# Patient Record
Sex: FEMALE | Race: WHITE | NOT HISPANIC OR LATINO | Employment: OTHER | URBAN - METROPOLITAN AREA
[De-identification: names, ages, dates, MRNs, and addresses within clinical notes are randomized per-mention and may not be internally consistent; named-entity substitution may affect disease eponyms.]

---

## 2017-03-29 ENCOUNTER — OFFICE VISIT (OUTPATIENT)
Dept: PAIN MEDICINE | Facility: CLINIC | Age: 52
End: 2017-03-29

## 2017-03-29 VITALS
TEMPERATURE: 98.5 F | DIASTOLIC BLOOD PRESSURE: 75 MMHG | RESPIRATION RATE: 18 BRPM | HEART RATE: 82 BPM | OXYGEN SATURATION: 99 % | SYSTOLIC BLOOD PRESSURE: 129 MMHG | BODY MASS INDEX: 26.03 KG/M2 | HEIGHT: 66 IN | WEIGHT: 162 LBS

## 2017-03-29 DIAGNOSIS — M54.40 CHRONIC LOW BACK PAIN WITH SCIATICA, SCIATICA LATERALITY UNSPECIFIED, UNSPECIFIED BACK PAIN LATERALITY: ICD-10-CM

## 2017-03-29 DIAGNOSIS — G89.29 CHRONIC LOW BACK PAIN WITH SCIATICA, SCIATICA LATERALITY UNSPECIFIED, UNSPECIFIED BACK PAIN LATERALITY: ICD-10-CM

## 2017-03-29 DIAGNOSIS — Z97.8 PRESENCE OF INTRATHECAL PUMP: ICD-10-CM

## 2017-03-29 DIAGNOSIS — G89.3 CANCER ASSOCIATED PAIN: Primary | ICD-10-CM

## 2017-03-29 LAB
POC AMPHETAMINES: NEGATIVE
POC BARBITURATES: NEGATIVE
POC BENZODIAZEPHINES: NEGATIVE
POC COCAINE: NEGATIVE
POC METHADONE: NEGATIVE
POC METHAMPHETAMINE SCREEN URINE: NEGATIVE
POC OPIATES: NEGATIVE
POC OXYCODONE: NEGATIVE
POC PHENCYCLIDINE: NEGATIVE
POC PROPOXYPHENE: NEGATIVE
POC THC: NEGATIVE
POC TRICYCLIC ANTIDEPRESSANTS: NEGATIVE

## 2017-03-29 PROCEDURE — 62369 ANAL SP INF PMP W/REPRG&FILL: CPT | Performed by: ANESTHESIOLOGY

## 2017-03-29 PROCEDURE — 99202 OFFICE O/P NEW SF 15 MIN: CPT | Performed by: ANESTHESIOLOGY

## 2017-03-29 PROCEDURE — 80305 DRUG TEST PRSMV DIR OPT OBS: CPT | Performed by: ANESTHESIOLOGY

## 2017-03-29 RX ORDER — HYDROCODONE BITARTRATE AND ACETAMINOPHEN 10; 325 MG/1; MG/1
1 TABLET ORAL EVERY 4 HOURS PRN
Qty: 50 TABLET | Refills: 0 | Status: SHIPPED | OUTPATIENT
Start: 2017-03-29 | End: 2017-04-05 | Stop reason: SDUPTHER

## 2017-03-29 RX ORDER — ACETAMINOPHEN 325 MG/1
650-975 TABLET ORAL AS NEEDED
Status: ON HOLD | COMMUNITY
End: 2019-10-18

## 2017-03-29 RX ORDER — FENTANYL 100 UG/H
1 PATCH TRANSDERMAL
COMMUNITY
End: 2017-03-29

## 2017-03-29 NOTE — PROGRESS NOTES
CHIEF COMPLAINT  Ms. Llanes states that her back pain started 10 years ago and she was diagnosed with Hodgkin's Lymphoma about 6 years ago. She states that she was previously in pain management with Dr. Crystal Gallego at [facility]. She states that she currently has a pian pump but states that bit has been empty for the last 2 months. It was last filled by Dr. Silvano Rowland. She states that her has had back injection but states that it didn't help.    Subjective   Renée Llanes is a 51 y.o. female.   She presents to the office for evaluation of cancer related pain. She was referred here by Dr. Jermaine Iraheta.   He is a cancer patient with an indwelling intrathecal pump.  She was displaced from the practice of Dr. Rowland, and unfortunately they allowed this patient's pump to run dry.      She also has a significant history of back pain issues.  The use of the intrathecal pump is reasonable for this as well as she has few other options except for long term oral analgesics.    She complains that the use of fentanyl patches causes an untenable side effect... She detects a sour, foul smell, but no one else can smell it.      RENÉE LLANES does 1ppd smoke. Smokes 1 PPD x many years. In terms of alcohol use, patient no use of alcohol. Drinks 0 per year. Family history is negative for alcoholism or addiction. Family history is negative for illicit drug use.          History of Present Illness     PEG Assessment   What number best describes your pain on average in the past week?8  What number best describes how, during the past week, pain has interfered with your enjoyment of life?9  What number best describes how, during the past week, pain has interfered with your general activity?  9        Current Outpatient Prescriptions:   •  acetaminophen (TYLENOL) 325 MG tablet, Take 325 mg by mouth As Needed for Mild Pain (1-3)., Disp: , Rfl:   •  HYDROcodone-acetaminophen (NORCO)  MG per tablet, Take 1 tablet by mouth  "Every 4 (Four) Hours As Needed for Severe Pain (7-10)., Disp: 50 tablet, Rfl: 0    Of note,,,, the hydrocodone was added today by me, ,as in the plan below.  She is instructed to stop using Fentanyl.      The following portions of the patient's history were reviewed and updated as appropriate: allergies, current medications, past family history, past medical history, past social history, past surgical history and problem list.      REVIEW OF PERTINENT MEDICAL DATA    ---    Prelim UDS Immunoassay:    THC  (marijuana)  negative  VINICIUS (cocaine) negative  OPI (opiate) negative  AMP (amphet) negative  MET (methamph) negative  PCP (pcp)  negative  MDMA (ecstacy) negative  BAR (barbituate) negative  BZO (benzodiaz) negative  MTD (methadone) negative  TCA (tricyclic) negative  OXY (oxycodone) negative    GreenTemp Warm  Appearance  WNL          ---                Review of Systems   Constitutional: Positive for fatigue.   HENT: Negative for congestion.    Eyes: Negative for visual disturbance.   Respiratory: Negative for cough, shortness of breath and wheezing.    Cardiovascular: Negative.    Gastrointestinal: Negative for constipation and diarrhea.   Genitourinary: Positive for difficulty urinating.   Musculoskeletal: Positive for back pain.   Neurological: Positive for weakness (bilateral legs) and numbness (bilateral legs).   Psychiatric/Behavioral: Positive for sleep disturbance. Negative for suicidal ideas. The patient is nervous/anxious.            Vitals:    03/29/17 1203   BP: 129/75   Pulse: 82   Resp: 18   Temp: 98.5 °F (36.9 °C)   SpO2: 99%   Weight: 162 lb (73.5 kg)   Height: 66\" (167.6 cm)   PainSc:   8   PainLoc: Back         Objective   Physical Exam   Constitutional: Vital signs are normal. She has a sickly appearance. She appears ill.   HENT:   Head: Normocephalic and atraumatic. Hair is abnormal.   Right Ear: Hearing and external ear normal.   Left Ear: Hearing and external ear normal.   Nose: Nose normal. "   Mouth/Throat: Uvula is midline and oropharynx is clear and moist.   Eyes: Conjunctivae and EOM are normal. Pupils are equal, round, and reactive to light.   Pulmonary/Chest: Effort normal.   Abdominal: Soft. Normal appearance and bowel sounds are normal. She exhibits mass (LLQ implanted Intrathecal pump). There is no hepatosplenomegaly. There is no tenderness.   Musculoskeletal:        Lumbar back: She exhibits decreased range of motion and tenderness.   Neurological: She is alert. She displays atrophy. No cranial nerve deficit. She exhibits abnormal muscle tone. Gait abnormal.   Skin: Bruising noted. No rash noted. Nails show clubbing.   Psychiatric: She has a normal mood and affect. Her speech is normal.   Nursing note and vitals reviewed.      Assessment/Plan   Isha was seen today for back pain.    Diagnoses and all orders for this visit:    Cancer associated pain  -     Urine Drug Screen Confirmation  -     POC Urine Drug Screen, Triage    Presence of intrathecal pump  -     Urine Drug Screen Confirmation  -     POC Urine Drug Screen, Triage    Chronic low back pain with sciatica, sciatica laterality unspecified, unspecified back pain laterality    Other orders  -     HYDROcodone-acetaminophen (NORCO)  MG per tablet; Take 1 tablet by mouth Every 4 (Four) Hours As Needed for Severe Pain (7-10).        --- Follow-up next week  --- we are going to bring her back next week to see if the pump is functioning.....  -- fill the pump with Normal Saline, running at 1ml per day  -- giving her some hydrocodone for cancer related Breakthrough pain (BTP)  -- discontinue duragesic patches  -- order pump solution today, assuming the pump is functioning so we can refill next week  -- if pump is not functioning, then we will need to potentially do a dye study to check patency of the catheter before setting up for a pump reservoir switch out.  Of course, she may not be a candidate to have any surgical procedure based  on immune status, and would therefore confer with Dr. Iraheta.               TERRELL REPORT    As part of the patient's treatment plan, I am prescribing controlled substances. The patient has been made aware of appropriate use of such medications, including potential risk of somnolence, limited ability to drive and/or work safely, and the potential for dependence or overdose. It has also bee made clear that these medications are for use by this patient only, without concomitant use of alcohol or other substances unless prescribed.     Patient has completed prescribing agreement detailing terms of continued prescribing of controlled substances, including monitoring TERRELL reports, urine drug screening, and pill counts if necessary. The patient is aware that inappropriate use will results in cessation of prescribing such medications.    TERRELL report has been reviewed and scanned into the patient's chart.    Date of last TERRELL : 3-25-17    History and physical exam exhibit continued safe and appropriate use of controlled substances.         EMR Dragon/Transcription disclaimer:   Much of this encounter note is an electronic transcription/translation of spoken language to printed text. The electronic translation of spoken language may permit erroneous, or at times, nonsensical words or phrases to be inadvertently transcribed; Although I have reviewed the note for such errors, some may still exist.        --    Intrathecal pump was interrogated in office today. Results are in chart.    The patient is currently at a dose of 12 mg  of hyrdomorphone per day, with previously running this 20ml pump with a concentration of 80mg per ml. There is the addition of PTM bolus. This is set at 0.5 mg/actuation given over 10 minutes. The lock-out is set at 6 hours with a maximum activation of 4 in 24 hours.    The pump had run dry.  The estimated volume dispensed since the last fill, based on the rotor calculations from the pump computer,  was the ludicrous number of 29.1ml.     Under sterile technique, the pump was accessed using a [employer] proprietary refill kit. The volume was withdrawn from the pump. The expected residual volume of the pump was 0 ml. The actual residual volume of the pump was 0 ml.    The pump was then refilled with 20 ml of Normal saline.  The pump was set to deliver 1ml per day.  Of course, the PTM was discontinued.    Patient tolerated procedure well. Minimal bleeding was noted. Pump site remains intact with no evidence of erythema or drainage.    Patient will return for pump refill when due or sooner if needed.

## 2017-04-05 ENCOUNTER — OFFICE VISIT (OUTPATIENT)
Dept: PAIN MEDICINE | Facility: CLINIC | Age: 52
End: 2017-04-05

## 2017-04-05 VITALS
HEIGHT: 66 IN | DIASTOLIC BLOOD PRESSURE: 81 MMHG | BODY MASS INDEX: 26.03 KG/M2 | HEART RATE: 70 BPM | WEIGHT: 162 LBS | RESPIRATION RATE: 16 BRPM | SYSTOLIC BLOOD PRESSURE: 121 MMHG | OXYGEN SATURATION: 96 %

## 2017-04-05 DIAGNOSIS — G89.29 OTHER CHRONIC PAIN: Primary | ICD-10-CM

## 2017-04-05 DIAGNOSIS — G89.3 CANCER ASSOCIATED PAIN: ICD-10-CM

## 2017-04-05 DIAGNOSIS — M54.41 CHRONIC LOW BACK PAIN WITH BILATERAL SCIATICA, UNSPECIFIED BACK PAIN LATERALITY: ICD-10-CM

## 2017-04-05 DIAGNOSIS — M54.42 CHRONIC LOW BACK PAIN WITH BILATERAL SCIATICA, UNSPECIFIED BACK PAIN LATERALITY: ICD-10-CM

## 2017-04-05 DIAGNOSIS — Z97.8 PRESENCE OF INTRATHECAL PUMP: ICD-10-CM

## 2017-04-05 DIAGNOSIS — G89.29 CHRONIC LOW BACK PAIN WITH BILATERAL SCIATICA, UNSPECIFIED BACK PAIN LATERALITY: ICD-10-CM

## 2017-04-05 PROCEDURE — 62369 ANAL SP INF PMP W/REPRG&FILL: CPT | Performed by: NURSE PRACTITIONER

## 2017-04-05 RX ORDER — HYDROCODONE BITARTRATE AND ACETAMINOPHEN 10; 325 MG/1; MG/1
1 TABLET ORAL EVERY 8 HOURS PRN
Qty: 45 TABLET | Refills: 0 | Status: SHIPPED | OUTPATIENT
Start: 2017-04-05 | End: 2017-04-13 | Stop reason: SDUPTHER
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
## 2017-04-13 ENCOUNTER — OFFICE VISIT (OUTPATIENT)
Dept: PAIN MEDICINE | Facility: CLINIC | Age: 52
End: 2017-04-13

## 2017-04-13 VITALS
OXYGEN SATURATION: 95 % | HEART RATE: 87 BPM | BODY MASS INDEX: 26.03 KG/M2 | WEIGHT: 162 LBS | DIASTOLIC BLOOD PRESSURE: 82 MMHG | RESPIRATION RATE: 16 BRPM | HEIGHT: 66 IN | TEMPERATURE: 99.2 F | SYSTOLIC BLOOD PRESSURE: 122 MMHG

## 2017-04-13 DIAGNOSIS — G89.3 CANCER ASSOCIATED PAIN: ICD-10-CM

## 2017-04-13 DIAGNOSIS — M54.41 CHRONIC LOW BACK PAIN WITH BILATERAL SCIATICA, UNSPECIFIED BACK PAIN LATERALITY: ICD-10-CM

## 2017-04-13 DIAGNOSIS — M54.42 CHRONIC LOW BACK PAIN WITH BILATERAL SCIATICA, UNSPECIFIED BACK PAIN LATERALITY: ICD-10-CM

## 2017-04-13 DIAGNOSIS — G89.29 OTHER CHRONIC PAIN: Primary | ICD-10-CM

## 2017-04-13 DIAGNOSIS — Z97.8 PRESENCE OF INTRATHECAL PUMP: ICD-10-CM

## 2017-04-13 DIAGNOSIS — G89.29 CHRONIC LOW BACK PAIN WITH BILATERAL SCIATICA, UNSPECIFIED BACK PAIN LATERALITY: ICD-10-CM

## 2017-04-13 PROCEDURE — 62368 ANALYZE SP INF PUMP W/REPROG: CPT | Performed by: NURSE PRACTITIONER

## 2017-04-13 RX ORDER — HYDROCODONE BITARTRATE AND ACETAMINOPHEN 10; 325 MG/1; MG/1
1 TABLET ORAL EVERY 8 HOURS PRN
Qty: 45 TABLET | Refills: 0 | Status: SHIPPED | OUTPATIENT
Start: 2017-04-13 | End: 2017-05-05 | Stop reason: SDUPTHER
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
## 2017-05-05 ENCOUNTER — OFFICE VISIT (OUTPATIENT)
Dept: PAIN MEDICINE | Facility: CLINIC | Age: 52
End: 2017-05-05

## 2017-05-05 VITALS
HEART RATE: 93 BPM | TEMPERATURE: 98.5 F | SYSTOLIC BLOOD PRESSURE: 107 MMHG | DIASTOLIC BLOOD PRESSURE: 73 MMHG | RESPIRATION RATE: 16 BRPM | BODY MASS INDEX: 26.03 KG/M2 | WEIGHT: 162 LBS | HEIGHT: 66 IN | OXYGEN SATURATION: 94 %

## 2017-05-05 DIAGNOSIS — G89.29 OTHER CHRONIC PAIN: Primary | ICD-10-CM

## 2017-05-05 DIAGNOSIS — G89.29 CHRONIC LOW BACK PAIN WITH BILATERAL SCIATICA, UNSPECIFIED BACK PAIN LATERALITY: ICD-10-CM

## 2017-05-05 DIAGNOSIS — M54.41 CHRONIC LOW BACK PAIN WITH BILATERAL SCIATICA, UNSPECIFIED BACK PAIN LATERALITY: ICD-10-CM

## 2017-05-05 DIAGNOSIS — G89.3 CANCER ASSOCIATED PAIN: ICD-10-CM

## 2017-05-05 DIAGNOSIS — Z97.8 PRESENCE OF INTRATHECAL PUMP: ICD-10-CM

## 2017-05-05 DIAGNOSIS — M54.42 CHRONIC LOW BACK PAIN WITH BILATERAL SCIATICA, UNSPECIFIED BACK PAIN LATERALITY: ICD-10-CM

## 2017-05-05 PROCEDURE — 99214 OFFICE O/P EST MOD 30 MIN: CPT | Performed by: NURSE PRACTITIONER

## 2017-05-05 RX ORDER — ALPRAZOLAM 0.5 MG/1
0.5 TABLET ORAL 2 TIMES DAILY PRN
COMMUNITY
End: 2017-09-28

## 2017-05-05 RX ORDER — ONDANSETRON 4 MG/1
4 TABLET, FILM COATED ORAL EVERY 8 HOURS PRN
COMMUNITY
End: 2017-09-28 | Stop reason: SDUPTHER

## 2017-05-05 RX ORDER — HYDROCODONE BITARTRATE AND ACETAMINOPHEN 10; 325 MG/1; MG/1
1 TABLET ORAL EVERY 8 HOURS PRN
Qty: 45 TABLET | Refills: 0 | Status: SHIPPED | OUTPATIENT
Start: 2017-05-05 | End: 2017-05-19 | Stop reason: SDUPTHER

## 2017-05-05 RX ORDER — CLINDAMYCIN HYDROCHLORIDE 300 MG/1
300 CAPSULE ORAL 3 TIMES DAILY
COMMUNITY
End: 2017-09-28

## 2017-05-19 ENCOUNTER — OFFICE VISIT (OUTPATIENT)
Dept: PAIN MEDICINE | Facility: CLINIC | Age: 52
End: 2017-05-19

## 2017-05-19 VITALS
SYSTOLIC BLOOD PRESSURE: 117 MMHG | HEART RATE: 61 BPM | HEIGHT: 66 IN | WEIGHT: 162 LBS | OXYGEN SATURATION: 93 % | BODY MASS INDEX: 26.03 KG/M2 | DIASTOLIC BLOOD PRESSURE: 76 MMHG | RESPIRATION RATE: 16 BRPM | TEMPERATURE: 98.8 F

## 2017-05-19 DIAGNOSIS — G89.3 CANCER ASSOCIATED PAIN: ICD-10-CM

## 2017-05-19 DIAGNOSIS — M54.42 CHRONIC LOW BACK PAIN WITH BILATERAL SCIATICA, UNSPECIFIED BACK PAIN LATERALITY: Primary | ICD-10-CM

## 2017-05-19 DIAGNOSIS — M54.41 CHRONIC LOW BACK PAIN WITH BILATERAL SCIATICA, UNSPECIFIED BACK PAIN LATERALITY: Primary | ICD-10-CM

## 2017-05-19 DIAGNOSIS — Z97.8 PRESENCE OF INTRATHECAL PUMP: ICD-10-CM

## 2017-05-19 DIAGNOSIS — G89.29 CHRONIC LOW BACK PAIN WITH BILATERAL SCIATICA, UNSPECIFIED BACK PAIN LATERALITY: Primary | ICD-10-CM

## 2017-05-19 PROCEDURE — 62369 ANAL SP INF PMP W/REPRG&FILL: CPT | Performed by: PAIN MEDICINE

## 2017-05-19 RX ORDER — HYDROCODONE BITARTRATE AND ACETAMINOPHEN 10; 325 MG/1; MG/1
1 TABLET ORAL EVERY 8 HOURS PRN
Qty: 45 TABLET | Refills: 0 | Status: SHIPPED | OUTPATIENT
Start: 2017-05-19 | End: 2017-06-15 | Stop reason: SDUPTHER

## 2017-06-15 ENCOUNTER — OFFICE VISIT (OUTPATIENT)
Dept: PAIN MEDICINE | Facility: CLINIC | Age: 52
End: 2017-06-15

## 2017-06-15 VITALS
WEIGHT: 165 LBS | SYSTOLIC BLOOD PRESSURE: 110 MMHG | HEIGHT: 66 IN | DIASTOLIC BLOOD PRESSURE: 85 MMHG | RESPIRATION RATE: 16 BRPM | TEMPERATURE: 97.6 F | OXYGEN SATURATION: 97 % | BODY MASS INDEX: 26.52 KG/M2 | HEART RATE: 58 BPM

## 2017-06-15 DIAGNOSIS — M54.41 CHRONIC LOW BACK PAIN WITH BILATERAL SCIATICA, UNSPECIFIED BACK PAIN LATERALITY: ICD-10-CM

## 2017-06-15 DIAGNOSIS — T85.610A MALFUNCTION OF INTRATHECAL INFUSION PUMP, INITIAL ENCOUNTER: ICD-10-CM

## 2017-06-15 DIAGNOSIS — G89.29 CHRONIC LOW BACK PAIN WITH BILATERAL SCIATICA, UNSPECIFIED BACK PAIN LATERALITY: ICD-10-CM

## 2017-06-15 DIAGNOSIS — Z97.8 PRESENCE OF INTRATHECAL PUMP: ICD-10-CM

## 2017-06-15 DIAGNOSIS — G89.29 OTHER CHRONIC PAIN: Primary | ICD-10-CM

## 2017-06-15 DIAGNOSIS — G89.3 CANCER ASSOCIATED PAIN: ICD-10-CM

## 2017-06-15 DIAGNOSIS — M54.42 CHRONIC LOW BACK PAIN WITH BILATERAL SCIATICA, UNSPECIFIED BACK PAIN LATERALITY: ICD-10-CM

## 2017-06-15 PROCEDURE — 62369 ANAL SP INF PMP W/REPRG&FILL: CPT | Performed by: NURSE PRACTITIONER

## 2017-06-15 RX ORDER — HYDROCODONE BITARTRATE AND ACETAMINOPHEN 10; 325 MG/1; MG/1
1 TABLET ORAL EVERY 8 HOURS PRN
Qty: 90 TABLET | Refills: 0 | Status: SHIPPED | OUTPATIENT
Start: 2017-06-15 | End: 2017-07-07 | Stop reason: SDUPTHER

## 2017-06-15 NOTE — PROGRESS NOTES
"CHIEF COMPLAINT  Since 5/5/16 office visit, Pt states LBP has increased.  Pt has not begun PT,states was told by the lymphedma clinic Pt should not try aqua therapy now due to \"possible skin breakdown in legs\".  Seen by vascular specialist,reportedly told had no vascular problems.    Subjective   Isha Llanes is a 51 y.o. female  who presents to the office for follow-up.She has a history of chronic low back pain.  She presents today for IT pump refill.  She continues to report that her pain is overall not well controlled and does not feel any improvement since the previous significant dose increase last month.  She reports that her pain is constant and debilitating. She feels that she cannot walk or stand up straight without significant pain.  Nothing really seems to be helping.  She has been unable to start any type of aquatic therapy because of her lymphoma and frequent open sores of the LE's, she reports that her neighbor comes over and massages her regularly. She continues to use hydrocodone for breakthrough pain in addition to the IT pump.  She denies any side effects from either regimen.        Back Pain   This is a chronic problem. The current episode started more than 1 year ago. The problem occurs constantly. The problem has been gradually worsening since onset. The pain is present in the lumbar spine. The quality of the pain is described as aching, burning and shooting. The pain radiates to the left foot and right foot. The pain is at a severity of 8/10. The pain is severe. The pain is the same all the time. Exacerbated by: all activity. Associated symptoms include numbness (bilateral legs) and weakness (bilateral legs). Pertinent negatives include no bladder incontinence, bowel incontinence or dysuria. Risk factors include history of cancer, lack of exercise and sedentary lifestyle. She has tried analgesics for the symptoms. The treatment provided mild relief.        PEG Assessment   What number best " "describes your pain on average in the past week?8  What number best describes how, during the past week, pain has interfered with your enjoyment of life?7  What number best describes how, during the past week, pain has interfered with your general activity?  8      The following portions of the patient's history were reviewed and updated as appropriate: allergies, current medications, past family history, past medical history, past social history, past surgical history and problem list.    Review of Systems   Constitutional: Positive for activity change (decreased) and fatigue. Negative for appetite change.   HENT: Negative for congestion.    Eyes: Negative for visual disturbance.   Respiratory: Negative for apnea, cough, chest tightness, shortness of breath and wheezing.    Cardiovascular: Positive for leg swelling (improvement).   Gastrointestinal: Positive for constipation and nausea. Negative for bowel incontinence and diarrhea.   Genitourinary: Negative for bladder incontinence, difficulty urinating (incontinence since 2 weeks ago,not daily) and dysuria.   Musculoskeletal: Positive for back pain.   Neurological: Positive for weakness (bilateral legs) and numbness (bilateral legs). Negative for dizziness and light-headedness (3 days ago (5/16/17)).   Psychiatric/Behavioral: Positive for sleep disturbance. Negative for suicidal ideas. The patient is nervous/anxious.        Vitals:    06/15/17 0958   BP: 110/85   Pulse: 58   Resp: 16   Temp: 97.6 °F (36.4 °C)   SpO2: 97%   Weight: 165 lb (74.8 kg)   Height: 66\" (167.6 cm)   PainSc: 8  Comment: LBP ranges from   PainLoc: Back         Objective   Physical Exam   Constitutional: She is oriented to person, place, and time. She appears well-developed and well-nourished. No distress.   HENT:   Head: Normocephalic and atraumatic.   Cardiovascular: Normal rate.    Pulmonary/Chest: Effort normal. No respiratory distress.   Neurological: She is alert and oriented to person, " place, and time. No cranial nerve deficit. Gait (uses motorized wheelchair) abnormal.   Skin: She is not diaphoretic. There is erythema.   Erythema over bilateral distal LE with mild swelling present        - Intrathecal pump was interrogated in office today. Results are in chart.  - Under sterile technique, the pump was accessed using a [employer] proprietary refill kit. The volume was withdrawn from the pump. The expected residual volume of the pump was 6.3 ml. The actual residual volume of the pump was 4.5 ml. The pump was then refilled with 20 ml of Hydromorphone/Naropin at a concentration of 5 mg/ml. Patient tolerated procedure well. Minimal bleeding was noted. Pump site remains intact with no evidence of erythema or drainage.       Assessment/Plan   Isha was seen today for back pain.    Diagnoses and all orders for this visit:    Other chronic pain    Malfunction of intrathecal infusion pump, initial encounter  -     Case Request    Cancer associated pain    Presence of intrathecal pump    Chronic low back pain with bilateral sciatica, unspecified back pain laterality    Other orders  -     HYDROcodone-acetaminophen (NORCO)  MG per tablet; Take 1 tablet by mouth Every 8 (Eight) Hours As Needed for Severe Pain (7-10).        --- Schedule for pump dye study due to increasing discrepancy between expected volume and actual residual volume as well as patient reporting sub therapeutic response despite medication increases   --- Increase daily continuous rate to 2.5mg Hydromorphone/Naropin daily. Will not make changes to PTM today  --- Refill hydrocodone.  Patient appears stable with current regimen. No adverse effects. Regarding continuation of opioids, there is no evidence of aberrant behavior or any red flags.  The patient continues with appropriate response to opioid therapy. ADL's remain intact by self.   --- The urine drug screen confirmation from 3- has been reviewed and the result is  appropriate based on patient history and TERRELL report  --- Likely would benefit from some physical therapy in the future as she is very deconditioned   --- Follow-up next refill         TERRELL REPORT  As part of the patient's treatment plan, I am prescribing controlled substances. The patient has been made aware of appropriate use of such medications, including potential risk of somnolence, limited ability to drive and/or work safely, and the potential for dependence or overdose. It has also bee made clear that these medications are for use by this patient only, without concomitant use of alcohol or other substances unless prescribed.     Patient has completed prescribing agreement detailing terms of continued prescribing of controlled substances, including monitoring TERRELL reports, urine drug screening, and pill counts if necessary. The patient is aware that inappropriate use will results in cessation of prescribing such medications.    TERRELL report has been reviewed and scanned into the patient's chart.    Date of last TERRELL : 6-    History and physical exam exhibit continued safe and appropriate use of controlled substances.    EMR Dragon/Transcription disclaimer:   Much of this encounter note is an electronic transcription/translation of spoken language to printed text. The electronic translation of spoken language may permit erroneous, or at times, nonsensical words or phrases to be inadvertently transcribed; Although I have reviewed the note for such errors, some may still exist.

## 2017-07-07 ENCOUNTER — OFFICE VISIT (OUTPATIENT)
Dept: PAIN MEDICINE | Facility: CLINIC | Age: 52
End: 2017-07-07

## 2017-07-07 VITALS
SYSTOLIC BLOOD PRESSURE: 112 MMHG | HEART RATE: 84 BPM | TEMPERATURE: 98.4 F | DIASTOLIC BLOOD PRESSURE: 73 MMHG | BODY MASS INDEX: 25.71 KG/M2 | WEIGHT: 160 LBS | OXYGEN SATURATION: 95 % | HEIGHT: 66 IN | RESPIRATION RATE: 16 BRPM

## 2017-07-07 DIAGNOSIS — G89.3 CANCER ASSOCIATED PAIN: Primary | ICD-10-CM

## 2017-07-07 DIAGNOSIS — M54.41 CHRONIC LOW BACK PAIN WITH BILATERAL SCIATICA, UNSPECIFIED BACK PAIN LATERALITY: ICD-10-CM

## 2017-07-07 DIAGNOSIS — G89.29 CHRONIC LOW BACK PAIN WITH BILATERAL SCIATICA, UNSPECIFIED BACK PAIN LATERALITY: ICD-10-CM

## 2017-07-07 DIAGNOSIS — M54.42 CHRONIC LOW BACK PAIN WITH BILATERAL SCIATICA, UNSPECIFIED BACK PAIN LATERALITY: ICD-10-CM

## 2017-07-07 DIAGNOSIS — Z97.8 PRESENCE OF INTRATHECAL PUMP: ICD-10-CM

## 2017-07-07 PROCEDURE — 62369 ANAL SP INF PMP W/REPRG&FILL: CPT | Performed by: NURSE PRACTITIONER

## 2017-07-07 RX ORDER — HYDROCODONE BITARTRATE AND ACETAMINOPHEN 10; 325 MG/1; MG/1
1 TABLET ORAL EVERY 8 HOURS PRN
Qty: 90 TABLET | Refills: 0 | Status: SHIPPED | OUTPATIENT
Start: 2017-07-07 | End: 2017-07-28 | Stop reason: SDUPTHER

## 2017-07-07 NOTE — PROGRESS NOTES
"CHIEF COMPLAINT  Pt here for pump refill.  Pt states LBP has \"not been well controlled\".    Subjective   Isha Llanes is a 51 y.o. female  who presents to the office for follow-up.She has a history of chronic back pain.      Her oral pain medication fell in the toilet 6 days ago. Has been unable to take.     Notes impaired sleep.    Is seeing a psychiatrist at the end of the month. Wants to re-start xanax. \"It helps things.\"    Complains of pain in her back. Today her pain is 7/10VAS. Describes the pain as continuous. She states it is not necessarily worse since her last office visit, but it is not improved and not well controlled over all. She asked about increasing her oral pain medication.  Denies any side effects from the pump medication/PTM dosing. Denies any side effects with Hydrocodone. Is prescribed Hydrocodone 10/325 q8hrs but is not currently taking because it fell into her toilet. Is helps her pain \"mildly.\"     Back Pain   This is a chronic problem. The current episode started more than 1 year ago. The problem occurs constantly. The problem has been gradually worsening since onset. The pain is present in the lumbar spine. The quality of the pain is described as aching, burning and shooting. The pain radiates to the left foot and right foot. The pain is at a severity of 7/10. The pain is the same all the time. Exacerbated by: all activity. Associated symptoms include numbness (bilateral legs) and weakness (bilateral legs). Pertinent negatives include no bladder incontinence, bowel incontinence or dysuria. Risk factors include history of cancer, lack of exercise and sedentary lifestyle. She has tried analgesics for the symptoms. The treatment provided mild relief.      PEG Assessment   What number best describes your pain on average in the past week?7  What number best describes how, during the past week, pain has interfered with your enjoyment of life?7  What number best describes how, during the " "past week, pain has interfered with your general activity?  7    The following portions of the patient's history were reviewed and updated as appropriate: allergies, current medications, past family history, past medical history, past social history, past surgical history and problem list.    Review of Systems   Constitutional: Positive for fatigue. Negative for activity change (decreasedremains limited) and appetite change.   HENT: Negative for congestion.    Eyes: Negative for visual disturbance.   Respiratory: Negative for apnea, cough, chest tightness, shortness of breath and wheezing.    Cardiovascular: Positive for leg swelling (improvement).   Gastrointestinal: Negative for bowel incontinence, constipation, diarrhea and nausea.   Genitourinary: Negative for bladder incontinence, difficulty urinating (incontinence since 2 weeks ago,not daily) and dysuria.   Musculoskeletal: Positive for back pain (bilateral leg pain as well).   Neurological: Positive for weakness (bilateral legs), light-headedness (when standing upright) and numbness (bilateral legs). Negative for dizziness.   Psychiatric/Behavioral: Positive for sleep disturbance. Negative for suicidal ideas. The patient is nervous/anxious.        Vitals:    07/07/17 1152   BP: 112/73   Pulse: 84   Resp: 16   Temp: 98.4 °F (36.9 °C)   SpO2: 95%   Weight: 160 lb (72.6 kg)   Height: 66\" (167.6 cm)   PainSc: 7  Comment: LBP ranges from 6-7/10   PainLoc: Back     Objective   Physical Exam   Constitutional: She is oriented to person, place, and time. She appears well-developed and well-nourished. No distress.   HENT:   Head: Normocephalic and atraumatic.   Eyes:   Near pinpoint pupils   Cardiovascular: Normal rate.    Pulmonary/Chest: Effort normal. No respiratory distress.   Abdominal:   Pump in LLQ   Neurological: She is alert and oriented to person, place, and time. No cranial nerve deficit. Gait (uses motorized wheelchair) abnormal.   Skin: She is not " diaphoretic. There is erythema.   Erythema over bilateral distal LE with mild swelling present       - Intrathecal pump was interrogated in office today. Results are in chart.  - Under sterile technique, the pump was accessed using a [employer] proprietary refill kit. The volume was withdrawn from the pump. The expected residual volume of the pump was 6.6 ml. The actual residual volume of the pump was 5.0 ml. The pump was then refilled with 20 ml of Hydromorphone/Naropin at a concentration of 5 mg/ml. Patient tolerated procedure well. Minimal bleeding was noted. Pump site remains intact with no evidence of erythema or drainage.    Assessment/Plan   Isha was seen today for back pain.    Diagnoses and all orders for this visit:    Cancer associated pain    Chronic low back pain with bilateral sciatica, unspecified back pain laterality    Presence of intrathecal pump    Other orders  -     HYDROcodone-acetaminophen (NORCO)  MG per tablet; Take 1 tablet by mouth Every 8 (Eight) Hours As Needed for Severe Pain (7-10).      --- Refill Hydrocodone. No changes at this time. She did ask about increasing. Reviewed that i would continue with Dr. Rios/Cyndee ESCOBAR previous plan of care, especially until we had further information in regards to the function of the intrathecal pump.  --- Pump dye study due to volume reservoir discrepancies.   --- No pump changes at this time. Await results of pump dye study.  --- Follow-up for pump dye study.      +++ ADDENDUM+++ Patient called office at 1535 requesting refill of her medication. It would require an early authorization. I did approve of this. However, she will be having a pill count performed the day of her procedure.+++       TERRELL REPORT    As part of the patient's treatment plan, I am prescribing controlled substances. The patient has been made aware of appropriate use of such medications, including potential risk of somnolence, limited ability to drive  and/or work safely, and the potential for dependence or overdose. It has also bee made clear that these medications are for use by this patient only, without concomitant use of alcohol or other substances unless prescribed.     Patient has completed prescribing agreement detailing terms of continued prescribing of controlled substances, including monitoring TERRELL reports, urine drug screening, and pill counts if necessary. The patient is aware that inappropriate use will results in cessation of prescribing such medications.    TERRELL report has been reviewed and scanned into the patient's chart.    Date of last TERRELL : 7-5-17    History and physical exam exhibit continued safe and appropriate use of controlled substances.      EMR Dragon/Transcription disclaimer:   Much of this encounter note is an electronic transcription/translation of spoken language to printed text. The electronic translation of spoken language may permit erroneous, or at times, nonsensical words or phrases to be inadvertently transcribed; Although I have reviewed the note for such errors, some may still exist.

## 2017-07-11 ENCOUNTER — OUTSIDE FACILITY SERVICE (OUTPATIENT)
Dept: PAIN MEDICINE | Facility: CLINIC | Age: 52
End: 2017-07-11

## 2017-07-11 ENCOUNTER — DOCUMENTATION (OUTPATIENT)
Dept: PAIN MEDICINE | Facility: CLINIC | Age: 52
End: 2017-07-11

## 2017-07-11 PROCEDURE — 75809 NONVASCULAR SHUNT X-RAY: CPT | Performed by: ANESTHESIOLOGY

## 2017-07-11 PROCEDURE — 61070 BRAIN CANAL SHUNT PROCEDURE: CPT | Performed by: ANESTHESIOLOGY

## 2017-07-11 PROCEDURE — 62368 ANALYZE SP INF PUMP W/REPROG: CPT | Performed by: ANESTHESIOLOGY

## 2017-07-12 NOTE — PROGRESS NOTES
Intrathecal Pump Dye Study:    [facility]      PREOPERATIVE DIAGNOSIS:  Mechanical dysfunction of the IDDS system.  Cancer associated pain.  Presence of IT pump.    POSTOPERATIVE DIAGNOSIS:  Same as preop diagnosis    PROCEDURE:   Intrathecal pump dye study  - CPT 20085, for [place] Puncture for aspiration/injection of the shunt  - CPT 66586-52, for shunt x-ray  - These CPTs are as indicated by the AMA    IDDS System:   [employer]onic synchromed II      PRE-PROCEDURE DISCUSSION WITH PATIENT:    Risks and complications were discussed with the patient prior to starting the procedure and informed consent was obtained.  We discussed various topics including but not limited to bleeding, infection, injury, nerve injury, paralysis, coma, overdose, reaction to injectate and/or medication, death, postprocedural painful flare-up, postprocedural site soreness, and a lack of pain relief resulting from the procedure or the knowledge gained from the procedure, and a risk of equipment malfunction or damage.        SURGEON:  Horace Rios MD    REASON FOR PROCEDURE:    Diminishing pain relief from IDDS use.  Also noted are discrepancies in the ARV and ERV on pump fills    SEDATION:  Patient declined administration of moderate sedation      LOCAL ANESTHETICS:  Lidocaine 1%, Volume 1 mL    DESCRIPTON OF PROCEDURE:  After obtaining informed consent, an I.V. was not started in the preoperative area. The patient taken to the operating room and was placed in the supine  position.  All pressure points were well padded.  EKG, blood pressure, and pulse oximeter were monitored.  The appropriate area was prepped with Chloraprep and draped in a sterile fashion.     The site of the pump was identified.  The area overlying the side access port was anesthetized with subcutaneous solution of local anesthetic.  Fluoroscopy was utilized to visualize the orientation of the pump in its pocket.  The proprietary pump catheter  access kit was used to puncture the skin and enter into the catheter access port (side port).      Aspiration was attempted and was positive for 5ml easily aspirated fluid.      Solution of dilute contrast dye (multihance) was prepared.  Dye was slowly injected into the side access port.  Injection was with moderate to significant resistance.  Dye was seen to track through the system to the intrathecal space, with the tip at the T11-T12 level.  However, there were two significant kinks noted... One laterally in the flank, and one near the anchor before diving into the interlaminar space.      The needle was removed intact.  Vital signs were stable throughout.    Patient tolerated procedure well. Minimal bleeding was noted. Pump site remains intact with no evidence of erythema or drainage.      ESTIMATED BLOOD LOSS:  none  SPECIMENS:  none    COMPLICATIONS:   No complications were noted.    TOLERANCE & DISCHARGE CONDITION:    The patient tolerated the procedure well.  The patient was transported to the recovery area without difficulties.  The patient was discharged to home under the care of family in stable and satisfactory condition.      PLAN OF CARE:  1. The patient was given our standard instruction sheet.  2. The patient will Return to clinic 2 wks.  3. The patient will resume all medications as per the medication reconciliation sheet.    I think she will likely benefit from a system replacement.   JUAN is 24 months, but if the pump has to be extracted from the pocket, to replace the catheter, then it would be reasonable to take this opportunity to place a new pump, and also change the pocket to a posterior location to add some comfort for the patient, as the abdominal site is not well tolerated.      --    As the contents of the catheter were aspirated, the pump was interrogated and the volume of the catheter was refilled with medication....    Intrathecal pump was interrogated in the procedure room today.  Results are also scanned in chart.    The patient is currently at a dose of 2.5mg  of hydromorphone/ropivacaine per day. There is the addition of PTM bolus. This is set at 0.35 mg/actuation given over 10 minutes. The lock-out is set at 8 hours with a maximum activation of 3 in 24 hours.    Reservoir volume is estimated at 17.9 ml at this time.  Catheter volume is 0.196ml.   Refill date was 8-2-17.      No changes to infusion rate.  A simple bolus of 1.25mg HM/Naropin was given over 17 minutes.       Patient will return for pump refill when due or sooner if needed.

## 2017-07-24 ENCOUNTER — TELEPHONE (OUTPATIENT)
Dept: PAIN MEDICINE | Facility: CLINIC | Age: 52
End: 2017-07-24

## 2017-07-24 NOTE — TELEPHONE ENCOUNTER
Spoke to patient on 7/24//17 about changing her pump refill date from 7/26/17 to 7/28/17.  Patient agreed with the changed appointment and will be seen at 1000 on 7/28/17.

## 2017-07-28 ENCOUNTER — OFFICE VISIT (OUTPATIENT)
Dept: PAIN MEDICINE | Facility: CLINIC | Age: 52
End: 2017-07-28

## 2017-07-28 VITALS
DIASTOLIC BLOOD PRESSURE: 77 MMHG | BODY MASS INDEX: 25.71 KG/M2 | OXYGEN SATURATION: 97 % | TEMPERATURE: 97.6 F | SYSTOLIC BLOOD PRESSURE: 112 MMHG | HEART RATE: 65 BPM | RESPIRATION RATE: 16 BRPM | HEIGHT: 66 IN | WEIGHT: 160 LBS

## 2017-07-28 DIAGNOSIS — Z97.8 PRESENCE OF INTRATHECAL PUMP: ICD-10-CM

## 2017-07-28 DIAGNOSIS — G89.3 CANCER ASSOCIATED PAIN: ICD-10-CM

## 2017-07-28 DIAGNOSIS — M54.41 CHRONIC LOW BACK PAIN WITH BILATERAL SCIATICA, UNSPECIFIED BACK PAIN LATERALITY: ICD-10-CM

## 2017-07-28 DIAGNOSIS — G89.29 CHRONIC LOW BACK PAIN WITH BILATERAL SCIATICA, UNSPECIFIED BACK PAIN LATERALITY: ICD-10-CM

## 2017-07-28 DIAGNOSIS — G89.29 OTHER CHRONIC PAIN: Primary | ICD-10-CM

## 2017-07-28 DIAGNOSIS — T85.610A MALFUNCTION OF INTRATHECAL INFUSION PUMP, INITIAL ENCOUNTER: ICD-10-CM

## 2017-07-28 DIAGNOSIS — M54.42 CHRONIC LOW BACK PAIN WITH BILATERAL SCIATICA, UNSPECIFIED BACK PAIN LATERALITY: ICD-10-CM

## 2017-07-28 PROCEDURE — 99214 OFFICE O/P EST MOD 30 MIN: CPT | Performed by: NURSE PRACTITIONER

## 2017-07-28 PROCEDURE — 62368 ANALYZE SP INF PUMP W/REPROG: CPT | Performed by: NURSE PRACTITIONER

## 2017-07-28 RX ORDER — HYDROCODONE BITARTRATE AND ACETAMINOPHEN 10; 325 MG/1; MG/1
TABLET ORAL
Qty: 150 TABLET | Refills: 0 | Status: SHIPPED | OUTPATIENT
Start: 2017-07-28 | End: 2017-08-29 | Stop reason: SDUPTHER

## 2017-07-28 RX ORDER — METHYLPREDNISOLONE 4 MG/1
4 TABLET ORAL DAILY
COMMUNITY
End: 2017-09-28

## 2017-07-28 NOTE — PROGRESS NOTES
CHIEF COMPLAINT  Pt continues to have significant bilateral LBP and L thigh pain with bilat. Leg numbness  Pt is now taking medrol dose sammie for generalized joint pain.    Subjective   Isha Llanes is a 51 y.o. female  who presents to the office for follow-up.She has a history of chronic low back pain and cancer associated pain.  Here today for IT pump refill.  Overall pain not well controlled. Patient continues to report severe pain despite increasing amounts of IT hydromorphone. She was sent for pump dye study on 7/11/2017, showed two kinks in the line and she ultimately needs pump revision/replacement.  The JUAN is 24 months and per Dr. Rios's procedure note  if the pump has to be extracted from the pocket, to replace the catheter, then it would be reasonable to at that time place a new pump, and also change the pocket to a posterior location to add some comfort for the patient.      Patient also continues with use of oral hydrocodone for breakthrough pain.  The patient is aware that this is not part of the long term plan of care.  Hydrocodone 10/325 helps to reduce the pain moderately, she denies adverse reactions.      Back Pain   This is a chronic problem. The current episode started more than 1 year ago. The problem occurs constantly. The problem has been gradually worsening since onset. The pain is present in the lumbar spine. The quality of the pain is described as aching, burning and shooting. The pain radiates to the left foot and right foot. The pain is at a severity of 8/10. The pain is the same all the time. Exacerbated by: all activity. Associated symptoms include numbness (bilateral legs) and weakness (bilateral legs). Pertinent negatives include no bladder incontinence, bowel incontinence or dysuria. Risk factors include history of cancer, lack of exercise and sedentary lifestyle. She has tried analgesics for the symptoms. The treatment provided mild relief.      PEG Assessment   What number  "best describes your pain on average in the past week?7  What number best describes how, during the past week, pain has interfered with your enjoyment of life?8  What number best describes how, during the past week, pain has interfered with your general activity?  7    The following portions of the patient's history were reviewed and updated as appropriate: allergies, current medications, past family history, past medical history, past social history, past surgical history and problem list.    Review of Systems   Constitutional: Positive for fatigue. Negative for activity change (decreasedremains limited) and appetite change.   HENT: Negative for congestion.    Eyes: Negative for visual disturbance.   Respiratory: Negative for apnea, cough, chest tightness, shortness of breath and wheezing.    Cardiovascular: Positive for leg swelling (improvement).   Gastrointestinal: Positive for diarrhea. Negative for bowel incontinence, constipation and nausea.   Genitourinary: Negative for bladder incontinence, difficulty urinating (incontinence since 2 weeks ago,not daily) and dysuria.   Musculoskeletal: Positive for back pain (bilateral leg pain as well).   Neurological: Positive for weakness (bilateral legs), light-headedness (when standing upright) and numbness (bilateral legs). Negative for dizziness.   Psychiatric/Behavioral: Positive for sleep disturbance. Negative for suicidal ideas. The patient is nervous/anxious.        Vitals:    07/28/17 1032   BP: 112/77   Pulse: 65   Resp: 16   Temp: 97.6 °F (36.4 °C)   SpO2: 97%   Weight: 160 lb (72.6 kg)   Height: 66\" (167.6 cm)   PainSc: 8  Comment: LBP centrally and bilaterally ranges from 5-9/10   PainLoc: Back         Objective   Physical Exam   Constitutional: She is oriented to person, place, and time. She appears well-developed and well-nourished.   HENT:   Head: Normocephalic and atraumatic.   Cardiovascular: Normal rate.    Pulmonary/Chest: Effort normal. No respiratory " distress.   Abdominal:   Pump in LLQ   Neurological: She is alert and oriented to person, place, and time. No cranial nerve deficit. Gait (uses motorized wheelchair) abnormal.   Skin: There is erythema.   Erythema over bilateral distal LE with mild swelling present       Assessment/Plan   Isha was seen today for back pain.    Diagnoses and all orders for this visit:    Other chronic pain    Chronic low back pain with bilateral sciatica, unspecified back pain laterality    Presence of intrathecal pump    Cancer associated pain    Malfunction of intrathecal infusion pump, initial encounter  -     Case Request    Other orders  -     HYDROcodone-acetaminophen (NORCO)  MG per tablet; Every 4-6 hours PRN for SEVERE pain, MAX 5/DAY!!!!      --- Schedule for IT Pump revision/replacement   --- Patient also to be schedule for IT pump fill next week under xray/ultrasound as it was unable to be accessed in the office today  --- IT pump interrogated and results scanned in, no changes made to current regimen  --- Hydrocodone refilled with slight dose escalation for this acute situation as IT pump not currently functioning properly.  Patient appears stable with current regimen. No adverse effects. Regarding continuation of opioids, there is no evidence of aberrant behavior or any red flags.  The patient continues with appropriate response to opioid therapy. ADL's remain intact by self.   --- The urine drug screen confirmation from 3/9/2017 has been reviewed and the result is appropriate based on patient history and TERRELL report  --- Follow-up pump revision         TERRELL REPORT    As part of the patient's treatment plan, I am prescribing controlled substances. The patient has been made aware of appropriate use of such medications, including potential risk of somnolence, limited ability to drive and/or work safely, and the potential for dependence or overdose. It has also bee made clear that these medications are for  use by this patient only, without concomitant use of alcohol or other substances unless prescribed.     Patient has completed prescribing agreement detailing terms of continued prescribing of controlled substances, including monitoring TERRELL reports, urine drug screening, and pill counts if necessary. The patient is aware that inappropriate use will results in cessation of prescribing such medications.    TERRELL report has been reviewed and scanned into the patient's chart.    Date of last TERRELL : 7/27/2017    History and physical exam exhibit continued safe and appropriate use of controlled substances.      EMR Dragon/Transcription disclaimer:   Much of this encounter note is an electronic transcription/translation of spoken language to printed text. The electronic translation of spoken language may permit erroneous, or at times, nonsensical words or phrases to be inadvertently transcribed; Although I have reviewed the note for such errors, some may still exist.

## 2017-08-03 ENCOUNTER — DOCUMENTATION (OUTPATIENT)
Dept: PAIN MEDICINE | Facility: CLINIC | Age: 52
End: 2017-08-03

## 2017-08-03 ENCOUNTER — OUTSIDE FACILITY SERVICE (OUTPATIENT)
Dept: PAIN MEDICINE | Facility: CLINIC | Age: 52
End: 2017-08-03

## 2017-08-03 PROCEDURE — 62370 ANL SP INF PMP W/MDREPRG&FIL: CPT | Performed by: ANESTHESIOLOGY

## 2017-08-04 NOTE — PROGRESS NOTES
Intrathecal Pump Refill / Reprogram with Fluoroscopic Guidance:  [facility]      PREOPERATIVE DIAGNOSIS:  Presence of IDDS system.  Cancer associated pain.     POSTOPERATIVE DIAGNOSIS:  Same as preop diagnosis    PROCEDURE:   Intrathecal pump Refill / Reprogram requiring MD expertise, and requirement for fluoroscopic guidance.  CPT 88129, 04017    IDDS System:   [facility]      PRE-PROCEDURE DISCUSSION WITH PATIENT:    Risks and complications were discussed with the patient prior to starting the procedure and informed consent was obtained.  We discussed various topics including but not limited to bleeding, infection, injury, nerve injury, paralysis, coma, overdose, reaction to injectate and/or medication, death, postprocedural painful flare-up, postprocedural site soreness, and a lack of pain relief resulting from the procedure or the knowledge gained from the procedure, and a risk of equipment malfunction or damage.        SURGEON:  Horace Rios MD    REASON FOR PROCEDURE:    Need for IT therapy is well established.  The intrathecal pump could not be filled with a blind percutaneous technique in the office.  After multiple attempts, the procedure in the office was aborted and the patient was scheduled for image-guided refill for patient safety.      SEDATION:  Patient declined administration of moderate sedation      LOCAL ANESTHETICS:  Lidocaine 1%, Volume 1.5 mL    DESCRIPTON OF PROCEDURE:  After obtaining informed consent, an I.V. was not started in the preoperative area. The patient taken to the operating room and was placed in the supine  position.  All pressure points were well padded.  EKG, blood pressure, and pulse oximeter were monitored.      The pump was interrogated.  The pump is currently running a solution of Dilaudid & Naropin at a concentration of 5mg/5mg/mL and at a dose of 2.5mg/2.5mg per day on the basal infusion.  In addition, a PTM dose was programmed.  The PTM dose is  0.35mg every 8hrs with a lockout of 3 doses per 24 hours.    There were 32 successful activations, 17 lockouts, 90 unsuccessful activations.  JUAN is 23 months.    The site of the pump was identified.  The appropriate area was prepped with Chloraprep and draped in a sterile fashion.    The area overlying the central port was anesthetized with subcutaneous solution of local anesthetic.  Fluoroscopy was utilized to visualize the orientation of the pump in its pocket.  The proprietary pump refill kit was used to puncture the skin and enter into the reservoir access port.      Aspiration was attempted and was easily accomplished.   The Expected Residual Volume (ERV) was 4.1ml.  The Actual Residual Volume aspirated was 5.5mL.  This amount was discarded.   Witnessed discard by Ryley Tate RN.    The pump was then refilled with the same solution.  The infusion program was not changed.  20 mL were placed in the pump without complications.    The needle was removed intact.  Vital signs were stable throughout.        ESTIMATED BLOOD LOSS:  none  SPECIMENS:  none    COMPLICATIONS:   No complications were noted.    TOLERANCE & DISCHARGE CONDITION:    The patient tolerated the procedure well.  Pump site is intact with minimal tenderness, and no erythema nor drainage.  The patient was transported to the recovery area without difficulties.  The patient was discharged to home under the care of family in stable and satisfactory condition.      PLAN OF CARE:  1. The patient was given our standard instruction sheet.  2. The patient will Return to clinic PRN and Return to clinic 4 wks.  3. The patient will resume all medications as per the medication reconciliation sheet.        4.   Patient will return for pump refill when due or sooner if needed.

## 2017-08-29 ENCOUNTER — DOCUMENTATION (OUTPATIENT)
Dept: PAIN MEDICINE | Facility: CLINIC | Age: 52
End: 2017-08-29

## 2017-08-29 RX ORDER — HYDROCODONE BITARTRATE AND ACETAMINOPHEN 10; 325 MG/1; MG/1
TABLET ORAL
Qty: 150 TABLET | Refills: 0 | Status: SHIPPED | OUTPATIENT
Start: 2017-08-29 | End: 2017-09-28 | Stop reason: SDUPTHER

## 2017-08-29 NOTE — PROGRESS NOTES
Intrathecal Pump Refill / Reprogram with Fluoroscopic Guidance:  [facility]      PREOPERATIVE DIAGNOSIS:  Presence of IDDS system.  Cancer associated pain.    POSTOPERATIVE DIAGNOSIS:  Same as preop diagnosis    PROCEDURE:   Intrathecal pump Refill / Reprogram requiring MD expertise, and requirement for fluoroscopic guidance.  CPT 51209, 84621    IDDS System:   [employer]      PRE-PROCEDURE DISCUSSION WITH PATIENT:    Risks and complications were discussed with the patient prior to starting the procedure and informed consent was obtained.  We discussed various topics including but not limited to bleeding, infection, injury, nerve injury, paralysis, coma, overdose, reaction to injectate and/or medication, death, postprocedural painful flare-up, postprocedural site soreness, and a lack of pain relief resulting from the procedure or the knowledge gained from the procedure, and a risk of equipment malfunction or damage.        SURGEON:  Horace Rios MD    REASON FOR PROCEDURE:    The previous refill was performed under fluoroscopy and was easy to perform..  The intrathecal pump could not be filled with a blind percutaneous technique in the office.  After multiple attempts, the procedure in the office was aborted and the patient was scheduled for image-guided refill for patient safety.    As she did have a previous dye study did appear to have some abnormalities with want to make sure that the pump is functioning appropriately or confirm whether it is not.  There was a slight discrepancy on the previous measurement of actual residual volumes so we will check that again today.    She is complaining that her pain is not very well controlled.  Regarding her PTM use, she had 45 successful activations over the past 26 days.  Therefore 29 lockout and 101 and successful activations.  I think this indicates that we need to try to modify her therapy.    SEDATION:  Patient declined administration of  moderate sedation      LOCAL ANESTHETICS:  Lidocaine 2%, Volume 1 mL    DESCRIPTON OF PROCEDURE:  After obtaining informed consent, an I.V. was not started in the preoperative area. The patient taken to the operating room and was placed in the supine  position.  All pressure points were well padded.  EKG, blood pressure, and pulse oximeter were monitored.      The pump was interrogated.  The pump is currently running a solution of hydromorphone and ropivacaine at a concentration of 5 mg/mL and 5 mg/mL and at a dose of 2.5 mg of each per day on the basal infusion.  In addition, a PTM dose was programmed.  The PTM dose is 0.35 mg every 8 hours with a lockout of 3 doses per 24 hours.      The site of the pump was identified.  The appropriate area was prepped with Chloraprep and draped in a sterile fashion.    The area overlying the central port was anesthetized with subcutaneous solution of local anesthetic.  Fluoroscopy was utilized to visualize the orientation of the pump in its pocket.  The proprietary pump refill kit was used to puncture the skin and enter into the reservoir access port.      Aspiration was attempted and positive for the medications..   The Expected Residual Volume (ERV) was 4 mL.  The Actual Residual Volume aspirated was 2.5 mL.  This amount was discarded.       The pump was then refilled with medications of the same concentration.  The infusion program was changed.  It was changed to 40 mg of each per day, and the PTM dosing was increased to 0.5 mg which could be administered every 6 hours.  This changed the total daily dose to 5 mg per day.  This makes her refill interval 18 days from today.  The estimated battery life is 22 months.    The needle was removed intact.  Vital signs were stable throughout.        ESTIMATED BLOOD LOSS:  none  SPECIMENS:  none    COMPLICATIONS:   No complications were noted.    TOLERANCE & DISCHARGE CONDITION:    The patient tolerated the procedure well.  Pump site is  intact with minimal tenderness, and no erythema nor drainage.  The patient was transported to the recovery area without difficulties.  The patient was discharged to home under the care of family in stable and satisfactory condition.      PLAN OF CARE:  1. The patient was given our standard instruction sheet.  2. The patient will Repeat injection 2 wks and Plan for This procedure actually be a reasonable under fluoroscopic guidance once again.  We are going to her medication as 20 mg/mL of each of the active ingredients..  3. The patient will resume all medications as per the medication reconciliation sheet.        4.   Patient will return for pump refill when due or sooner if needed.

## 2017-09-14 ENCOUNTER — OUTSIDE FACILITY SERVICE (OUTPATIENT)
Dept: PAIN MEDICINE | Facility: CLINIC | Age: 52
End: 2017-09-14

## 2017-09-14 ENCOUNTER — DOCUMENTATION (OUTPATIENT)
Dept: PAIN MEDICINE | Facility: CLINIC | Age: 52
End: 2017-09-14

## 2017-09-14 PROCEDURE — 77002 NEEDLE LOCALIZATION BY XRAY: CPT | Performed by: ANESTHESIOLOGY

## 2017-09-14 PROCEDURE — 62370 ANL SP INF PMP W/MDREPRG&FIL: CPT | Performed by: ANESTHESIOLOGY

## 2017-09-15 NOTE — PROGRESS NOTES
Intrathecal Pump Refill / Reprogram with Fluoroscopic Guidance:  [facility]      PREOPERATIVE DIAGNOSIS:  Presence of IDDS system.  Cancer assoc pain.  Chr pain syndrome    POSTOPERATIVE DIAGNOSIS:  Same as preop diagnosis    PROCEDURE:   Intrathecal pump Refill / Reprogram requiring MD expertise, and requirement for fluoroscopic guidance.  CPT 37521, 78981    IDDS System:   [employer]      PRE-PROCEDURE DISCUSSION WITH PATIENT:    Risks and complications were discussed with the patient prior to starting the procedure and informed consent was obtained.  We discussed various topics including but not limited to bleeding, infection, injury, nerve injury, paralysis, coma, overdose, reaction to injectate and/or medication, death, postprocedural painful flare-up, postprocedural site soreness, and a lack of pain relief resulting from the procedure or the knowledge gained from the procedure, and a risk of equipment malfunction or damage.        SURGEON:  Horace Rios MD    REASON FOR PROCEDURE:    The previous refill under fluoro went well.  The technique is very important as we are also wanting to assess the function of this pump, as there was previously a discrepancy between ERV and ARV and we were considering whether or not to revise the system.  The intrathecal pump could not be filled with a blind percutaneous technique in the office.  After multiple attempts, the procedure in the office was aborted and the patient was scheduled for image-guided refill for patient safety.      SEDATION:  Patient declined administration of moderate sedation      LOCAL ANESTHETICS:  Lidocaine 1%, Volume 1 mL    DESCRIPTON OF PROCEDURE:  After obtaining informed consent, an I.V. was not started in the preoperative area. The patient taken to the operating room and was placed in the supine  position.  All pressure points were well padded.  EKG, blood pressure, and pulse oximeter were monitored.       The pump was interrogated.  The pump is currently running a solution of hydromorphone and ropivacaine at a concentration of 5mg/ml of each and at a dose of 3mg/3mg/day on the basal infusion.  In addition, a PTM dose was programmed.  The PTM dose is 0.5mg/0.5mg every 6 hrs with a lockout of 4 doses per 24 hours.      The site of the pump was identified.  The appropriate area was prepped with Chloraprep and draped in a sterile fashion.    The area overlying the central port was anesthetized with subcutaneous solution of local anesthetic.  Fluoroscopy was utilized to visualize the orientation of the pump in its pocket.  The proprietary pump refill kit was used to puncture the skin and enter into the reservoir access port.      Aspiration was attempted and was positive.   The Expected Residual Volume (ERV) was 9.3ml.  The Actual Residual Volume aspirated was 8ml.  This amount was discarded.       The pump was then refilled with Hydromorphone 20mg/ml & Ropivacaine 10mg/ml solution, 20ml.  The infusion program was changed.  The basal rate was changed to 4mg/2mg per day.  PTM was not changed.    The JUAN is 22 months.    The needle was removed intact.  Vital signs were stable throughout.        ESTIMATED BLOOD LOSS:  none  SPECIMENS:  none    COMPLICATIONS:   No complications were noted.    TOLERANCE & DISCHARGE CONDITION:    The patient tolerated the procedure well.  Pump site is intact with minimal tenderness, and no erythema nor drainage.  The patient was transported to the recovery area without difficulties.  The patient was discharged to home under the care of family in stable and satisfactory condition.      PLAN OF CARE:  1. The patient was given our standard instruction sheet.  2. The patient will Plan for refill PRN.  3. The patient will resume all medications as per the medication reconciliation sheet.        4.   Patient will return for pump refill when due or sooner if needed.

## 2017-09-28 ENCOUNTER — OFFICE VISIT (OUTPATIENT)
Dept: PAIN MEDICINE | Facility: CLINIC | Age: 52
End: 2017-09-28

## 2017-09-28 VITALS
SYSTOLIC BLOOD PRESSURE: 114 MMHG | HEART RATE: 81 BPM | DIASTOLIC BLOOD PRESSURE: 79 MMHG | OXYGEN SATURATION: 97 % | TEMPERATURE: 98 F | RESPIRATION RATE: 16 BRPM | HEIGHT: 66 IN

## 2017-09-28 DIAGNOSIS — G89.29 CHRONIC LOW BACK PAIN WITH BILATERAL SCIATICA, UNSPECIFIED BACK PAIN LATERALITY: ICD-10-CM

## 2017-09-28 DIAGNOSIS — M54.41 CHRONIC LOW BACK PAIN WITH BILATERAL SCIATICA, UNSPECIFIED BACK PAIN LATERALITY: ICD-10-CM

## 2017-09-28 DIAGNOSIS — M46.1 SACROILIITIS (HCC): ICD-10-CM

## 2017-09-28 DIAGNOSIS — M53.3 SACROILIAC JOINT DYSFUNCTION: ICD-10-CM

## 2017-09-28 DIAGNOSIS — G89.3 CANCER ASSOCIATED PAIN: Primary | ICD-10-CM

## 2017-09-28 DIAGNOSIS — M54.42 CHRONIC LOW BACK PAIN WITH BILATERAL SCIATICA, UNSPECIFIED BACK PAIN LATERALITY: ICD-10-CM

## 2017-09-28 DIAGNOSIS — Z97.8 PRESENCE OF INTRATHECAL PUMP: ICD-10-CM

## 2017-09-28 PROCEDURE — 99214 OFFICE O/P EST MOD 30 MIN: CPT | Performed by: NURSE PRACTITIONER

## 2017-09-28 PROCEDURE — 62368 ANALYZE SP INF PUMP W/REPROG: CPT | Performed by: NURSE PRACTITIONER

## 2017-09-28 RX ORDER — HYDROCODONE BITARTRATE AND ACETAMINOPHEN 10; 325 MG/1; MG/1
TABLET ORAL
Qty: 150 TABLET | Refills: 0 | Status: SHIPPED | OUTPATIENT
Start: 2017-09-28 | End: 2017-10-26 | Stop reason: SDUPTHER

## 2017-09-28 NOTE — PROGRESS NOTES
"CHIEF COMPLAINT  Pt states is having tenderness with brief sharp pain on a small area at lumbar surgery scar.  LBP is otherwise unchanged,with significant bilateral pain.    Subjective   Isha Llanes is a 51 y.o. female  who presents to the office for follow-up.She has a history of chronic back pain and a history of cancer.    Since her last office visit, her pump concentration was increased. The basal rate was also increased from 3.0 to 4.0 mg/day.  Did notice \"a little improvement for a few days.\"    Complains of pain in her low back. Today her pain is 7/10VAS. Describes the pain as continuous and unchanged.  Continues with IT pump. Denies any side effects from this.  Continues with Hydrocodone 10/325 5/day. Denies any side effects from the regimen. It helps decrease her pain moderately. ADL's by self.     Is having increased complaints of pain in her low back and hips. Used to have injections with significant relief    She is unsure of the status of pump revision.    Back Pain   This is a chronic problem. The current episode started more than 1 year ago. The problem occurs constantly. The problem has been gradually worsening since onset. The pain is present in the lumbar spine. The quality of the pain is described as aching, burning and shooting. The pain radiates to the left foot and right foot. The pain is at a severity of 7/10. The pain is the same all the time. Exacerbated by: all activity. Associated symptoms include numbness (bilateral legs) and weakness (bilateral legs). Pertinent negatives include no bladder incontinence, bowel incontinence or dysuria. Risk factors include history of cancer, lack of exercise and sedentary lifestyle. She has tried analgesics (IT pump, hydrocodone-moderate) for the symptoms. The treatment provided mild relief.      PEG Assessment   What number best describes your pain on average in the past week?7  What number best describes how, during the past week, pain has " "interfered with your enjoyment of life?7  What number best describes how, during the past week, pain has interfered with your general activity?  7    The following portions of the patient's history were reviewed and updated as appropriate: allergies, current medications, past family history, past medical history, past social history, past surgical history and problem list.    Review of Systems   Constitutional: Positive for fatigue. Negative for activity change (decreasedremains limited) and appetite change.   HENT: Negative for congestion.    Eyes: Negative for visual disturbance.   Respiratory: Negative for apnea, cough, chest tightness, shortness of breath and wheezing.    Cardiovascular: Positive for leg swelling (improvement).   Gastrointestinal: Negative for bowel incontinence, constipation, diarrhea and nausea.   Genitourinary: Negative for bladder incontinence, difficulty urinating (incontinence since 2 weeks ago,not daily) and dysuria.   Musculoskeletal: Positive for back pain (bilateral leg pain as well).   Neurological: Positive for weakness (bilateral legs), light-headedness (when standing upright) and numbness (bilateral legs). Negative for dizziness.   Psychiatric/Behavioral: Positive for sleep disturbance. Negative for suicidal ideas. The patient is nervous/anxious.        Vitals:    09/28/17 1055   BP: 114/79   Pulse: 81   Resp: 16   Temp: 98 °F (36.7 °C)   SpO2: 97%   Height: 66\" (167.6 cm)   PainSc: 7  Comment: LBP ranges from 6-8/10   PainLoc: Back       Objective   Physical Exam   Constitutional: She is oriented to person, place, and time. She appears well-developed and well-nourished.   HENT:   Head: Normocephalic and atraumatic.   Cardiovascular: Normal rate.    Pulmonary/Chest: Effort normal. No respiratory distress.   Abdominal:   Pump in LLQ   Musculoskeletal:        Lumbar back: She exhibits tenderness, bony tenderness and pain.   Surgical scar of lumbar spine    Exquisite tenderness of " bilateral SI joints    Unable to perform BASILIO due to patient in wheelchair.    Neurological: She is alert and oriented to person, place, and time. No cranial nerve deficit. Gait (uses motorized wheelchair) abnormal.   Skin: There is erythema.   Erythema over bilateral distal LE with mild swelling present         Assessment/Plan   Isha was seen today for back pain.    Diagnoses and all orders for this visit:    Cancer associated pain    Chronic low back pain with bilateral sciatica, unspecified back pain laterality    Presence of intrathecal pump    Sacroiliitis  -     Case Request    Sacroiliac joint dysfunction  -     Case Request    Other orders  -     HYDROcodone-acetaminophen (NORCO)  MG per tablet; Every 4-6 hours PRN for SEVERE pain, MAX 5/DAY      --- The urine drug screen confirmation from 3-29-17 has been reviewed and the result is appropriate based on patient history and TERRELL report  --- Refill Hydrocodone. Patient appears stable with current regimen. No adverse effects. Regarding continuation of opioids, there is no evidence of aberrant behavior or any red flags.  The patient continues with appropriate response to opioid therapy. ADL's remain intact by self. Reviewed only taking medication as prescribed and to not expedite the use of medication.   --- bilateral Si joint injections. No blood thinners. Reviewed the procedure at length with the patient.  Included in the review was expectations, complications, risk and benefits.The procedure was described in detail and the risks, benefits and alternatives were discussed with the patient (including but not limited to: bleeding, infection, nerve damage, worsening of pain, inability to perform injection, paralysis, seizures, and death) who agreed to proceed.  Discussed the potential for sedation if warranted/wanted.  Questions were answered and in a way the patient could understand.  Patient verbalized understanding and wishes to proceed.  This  intervention will be ordered.  --- increase pump to 4.2 mg/day.   --- Follow-up 1 month with Dr. Rios for update of plan of care.       TERRELL REPORT    As part of the patient's treatment plan, I am prescribing controlled substances. The patient has been made aware of appropriate use of such medications, including potential risk of somnolence, limited ability to drive and/or work safely, and the potential for dependence or overdose. It has also bee made clear that these medications are for use by this patient only, without concomitant use of alcohol or other substances unless prescribed.     Patient has completed prescribing agreement detailing terms of continued prescribing of controlled substances, including monitoring TERRELL reports, urine drug screening, and pill counts if necessary. The patient is aware that inappropriate use will results in cessation of prescribing such medications.    TERRELL report has been reviewed and scanned into the patient's chart.    Date of last TERRELL : 9-27-17    History and physical exam exhibit continued safe and appropriate use of controlled substances.      EMR Dragon/Transcription disclaimer:   Much of this encounter note is an electronic transcription/translation of spoken language to printed text. The electronic translation of spoken language may permit erroneous, or at times, nonsensical words or phrases to be inadvertently transcribed; Although I have reviewed the note for such errors, some may still exist.

## 2017-10-26 ENCOUNTER — OUTSIDE FACILITY SERVICE (OUTPATIENT)
Dept: PAIN MEDICINE | Facility: CLINIC | Age: 52
End: 2017-10-26

## 2017-10-26 ENCOUNTER — DOCUMENTATION (OUTPATIENT)
Dept: PAIN MEDICINE | Facility: CLINIC | Age: 52
End: 2017-10-26

## 2017-10-26 PROCEDURE — 27096 INJECT SACROILIAC JOINT: CPT | Performed by: ANESTHESIOLOGY

## 2017-10-26 RX ORDER — HYDROCODONE BITARTRATE AND ACETAMINOPHEN 10; 325 MG/1; MG/1
TABLET ORAL
Qty: 80 TABLET | Refills: 0 | Status: SHIPPED | OUTPATIENT
Start: 2017-10-26 | End: 2017-11-13 | Stop reason: SDUPTHER

## 2017-10-26 NOTE — TELEPHONE ENCOUNTER
Medication Refill Request    Date of phone call: 10/26/17    Medication being requested: Hydrocodone-apap 10 sig: q4-6hrs   Qty: 150    Date of last visit: 9/28/17    Date of last refill: 9/28/17    TERRELL up to date?: 9/28/17    Next Follow up?: 11/14/17    Any new pertinent information? (i.e, new medication allergies, new use of medications, change in patient's health or condition, non-compliance or inconsistency with prescribing agreement?): n/a

## 2017-10-26 NOTE — PROGRESS NOTES
Bilateral Sacroiliac Joint Injection  [facility]      PREOPERATIVE DIAGNOSIS:   Sacroiliac joint dysfunction, bilateral    POSTOPERATIVE DIAGNOSIS:  Sacroiliac joint dysfunction, bilateral    PROCEDURE:  Sacroiliac Joint Injection, Bilaterally, with fluoroscopic guidance    PRE-PROCEDURE DISCUSSION WITH PATIENT:    Risks and complications were discussed with the patient prior to starting the procedure and informed consent was obtained.  We discussed various topics including but not limited to bleeding, infection, injury, postprocedural site soreness, painful flareup, worsening of clinical picture, paralysis, coma, and death.     SURGEON:  Horace Rios MD    REASON FOR PROCEDURE:    Patient has pain consistent with SI pathology on history and physical exam. Patient has other lumbrosacral pathology that makes the injection diagnostically necessary. Positive sacroiliac provocation maneuvers noted.      SEDATION:  Versed 6mg and Fentanyl 200 mcg IV  ANESTHETIC AGENT:  Marcaine 0.5%  STEROID AGENT:  80mg DepoMedrol    DESCRIPTON OF PROCEDURE:  After obtaining informed consent, IV access was obtained in the preoperative area.  The patient was transported to the operative suite and placed in the prone position with a pillow under the pelvic area. EKG, blood pressure, and pulse oximeter were monitored. The lumbosacral area was prepped with Chloraprep and draped in a sterile fashion. Under fluoroscopic guidance the inferior most portion of the right SI joint was identified. The overlying skin and subcutaneous tissue was anesthetized with 1% lidocaine. A 22-gauge spinal needle was introduced from the inferior most portion of the joint into the RIGHT SI joint under fluoroscopic guidance in the AP dimension with slight oblique rotation to the contralateral side.  Aspiration was negative.  After confirming the position of the needle with fluoroscopy, 1 mL of Omnipaque was injected and after seeing  appropriate spread into the joint a total of 2mL of 0.5% Marcaine, with approximately 40 mg of DepoMedrol, was injected very slowly.  Needle was removed intact.  A similar procedure was performed on the LEFT side.   Vital signs remained stable.  The onset of analgesia was noted.      ESTIMATED BLOOD LOSS:  minimal  SPECIMENS:  None  COMPLICATIONS:  No complications were noted., There was no indication of vascular uptake on live injection of contrast dye. and patient complained that she did not feel sedated enough and expressed expectations for deep anesthesia, and we discussed that those expectations are not realistic.      TOLERANCE & DISCHARGE CONDITION:    The patient tolerated the procedure well.  The patient was transported to the recovery area without difficulties.  The patient was discharged to home under the care of family in stable and satisfactory condition.    PLAN OF CARE:  1. The patient was given our standard instruction sheet and will resume all medications as per the medication reconciliation sheet.  2. The patient will Return to clinic 3 wks.    3. The patient is instructed to keep a pain log hourly for 8 hours after the procedure.

## 2017-11-13 ENCOUNTER — OFFICE VISIT (OUTPATIENT)
Dept: PAIN MEDICINE | Facility: CLINIC | Age: 52
End: 2017-11-13

## 2017-11-13 VITALS
TEMPERATURE: 98.3 F | OXYGEN SATURATION: 97 % | RESPIRATION RATE: 16 BRPM | HEIGHT: 66 IN | DIASTOLIC BLOOD PRESSURE: 77 MMHG | HEART RATE: 97 BPM | SYSTOLIC BLOOD PRESSURE: 128 MMHG

## 2017-11-13 DIAGNOSIS — G89.29 CHRONIC BILATERAL LOW BACK PAIN WITH BILATERAL SCIATICA: ICD-10-CM

## 2017-11-13 DIAGNOSIS — Z97.8 PRESENCE OF INTRATHECAL PUMP: ICD-10-CM

## 2017-11-13 DIAGNOSIS — M46.1 SACROILIITIS (HCC): ICD-10-CM

## 2017-11-13 DIAGNOSIS — M54.41 CHRONIC BILATERAL LOW BACK PAIN WITH BILATERAL SCIATICA: ICD-10-CM

## 2017-11-13 DIAGNOSIS — M54.42 CHRONIC BILATERAL LOW BACK PAIN WITH BILATERAL SCIATICA: ICD-10-CM

## 2017-11-13 DIAGNOSIS — G89.3 CANCER ASSOCIATED PAIN: Primary | ICD-10-CM

## 2017-11-13 PROCEDURE — 62369 ANAL SP INF PMP W/REPRG&FILL: CPT | Performed by: ANESTHESIOLOGY

## 2017-11-13 RX ORDER — HYDROCODONE BITARTRATE AND ACETAMINOPHEN 10; 325 MG/1; MG/1
1 TABLET ORAL 2 TIMES DAILY PRN
Qty: 60 TABLET | Refills: 0 | Status: SHIPPED | OUTPATIENT
Start: 2017-11-13 | End: 2017-11-13 | Stop reason: SDUPTHER

## 2017-11-13 RX ORDER — HYDROCODONE BITARTRATE AND ACETAMINOPHEN 10; 325 MG/1; MG/1
1 TABLET ORAL 2 TIMES DAILY PRN
Qty: 60 TABLET | Refills: 0 | Status: SHIPPED | OUTPATIENT
Start: 2017-11-13 | End: 2017-12-14 | Stop reason: SINTOL

## 2017-11-13 RX ORDER — LEVOFLOXACIN 500 MG/1
500 TABLET, FILM COATED ORAL DAILY
COMMUNITY
End: 2018-02-22

## 2017-11-13 RX ORDER — ALPRAZOLAM 0.5 MG/1
0.5 TABLET ORAL 2 TIMES DAILY PRN
COMMUNITY
End: 2018-01-17 | Stop reason: SDUPTHER

## 2017-11-13 NOTE — PROGRESS NOTES
CHIEF COMPLAINT  Pt states LBP has not improved since last seen in office on 9/28/17 for refill.  Pt has been taking levaquin reportedly for L leg staff infection.    Subjective   Isha Llanes is a 51 y.o. female  who presents to the office for follow-up.She has a history of chronic pain and cancer pain and also leg infection currently.    She finds her pain to be poorly controlled.  We continue to titrate her IT pump.      History of Present Illness     PEG Assessment   What number best describes your pain on average in the past week?8  What number best describes how, during the past week, pain has interfered with your enjoyment of life?9  What number best describes how, during the past week, pain has interfered with your general activity?  8      The following portions of the patient's history were reviewed and updated as appropriate: allergies, current medications, past family history, past medical history, past social history, past surgical history and problem list.    Review of Systems   Constitutional: Positive for fatigue. Negative for activity change (decreasedremains limited) and appetite change.   HENT: Negative for congestion.    Eyes: Negative for visual disturbance.   Respiratory: Negative for apnea, cough, chest tightness, shortness of breath and wheezing.    Cardiovascular: Positive for leg swelling (improvement).   Gastrointestinal: Negative for constipation, diarrhea and nausea.   Genitourinary: Negative for difficulty urinating (incontinence since 2 weeks ago,not daily) and dysuria.   Musculoskeletal: Positive for back pain (bilateral leg pain as well).   Neurological: Positive for weakness (bilateral legs) and numbness (bilateral legs). Negative for dizziness and light-headedness (when standing upright).   Psychiatric/Behavioral: Positive for sleep disturbance. Negative for suicidal ideas. The patient is nervous/anxious.        Vitals:    11/13/17 1338   BP: 128/77   Pulse: 97   Resp: 16  "  Temp: 98.3 °F (36.8 °C)   SpO2: 97%   Height: 66\" (167.6 cm)   PainSc: 8  Comment: LBP bilaterally ranges from 7-9/10   PainLoc: Back         Objective   Physical Exam        Assessment/Plan   Isha was seen today for back pain.    Diagnoses and all orders for this visit:    Cancer associated pain    Presence of intrathecal pump    Sacroiliitis    Chronic bilateral low back pain with bilateral sciatica    Other orders  -     Discontinue: HYDROcodone-acetaminophen (NORCO)  MG per tablet; Take 1 tablet by mouth 2 (Two) Times a Day As Needed for Severe Pain  (cancer assoc pain).  -     HYDROcodone-acetaminophen (NORCO)  MG per tablet; Take 1 tablet by mouth 2 (Two) Times a Day As Needed for Severe Pain  (cancer assoc pain).        --- Follow-up for refill  -- Refill / Reprogram today..... Increased the Basal from 4.2/2.1mg per day up to 6/3mg per day, and PA is a 2hr LO 0.5mg/0.25.g per dose, max 10 doses per day                TERRELL REPORT    As part of the patient's treatment plan, I am prescribing controlled substances. The patient has been made aware of appropriate use of such medications, including potential risk of somnolence, limited ability to drive and/or work safely, and the potential for dependence or overdose. It has also bee made clear that these medications are for use by this patient only, without concomitant use of alcohol or other substances unless prescribed.     Patient has completed prescribing agreement detailing terms of continued prescribing of controlled substances, including monitoring TERRELL reports, urine drug screening, and pill counts if necessary. The patient is aware that inappropriate use will results in cessation of prescribing such medications.    TERRELL report has been reviewed and scanned into the patient's chart.    Date of last TERRELL : scanned into chart    History and physical exam exhibit continued safe and appropriate use of controlled substances.      EMR " Dragon/Transcription disclaimer:   Much of this encounter note is an electronic transcription/translation of spoken language to printed text. The electronic translation of spoken language may permit erroneous, or at times, nonsensical words or phrases to be inadvertently transcribed; Although I have reviewed the note for such errors, some may still exist.        --    Intrathecal pump was interrogated in office today. Results are in chart.    The patient is currently at a dose of 4.2/2.1 mg as above of hydromorphone/ropivacaine per day. There is the addition of PTM bolus.     Under sterile technique, the pump was accessed using a [employer] proprietary refill kit. The volume was withdrawn from the pump. The expected residual volume of the pump was slightly less than the actual residual volume.    The pump was then refilled with 20 ml of the same concentration of 20 mg/ml.  Changes were made as above.      Patient tolerated procedure well. Minimal bleeding was noted. Pump site remains intact with no evidence of erythema or drainage.    Patient will return for pump refill when due or sooner if needed.

## 2017-11-20 ENCOUNTER — TELEPHONE (OUTPATIENT)
Dept: PAIN MEDICINE | Facility: CLINIC | Age: 52
End: 2017-11-20

## 2017-12-14 ENCOUNTER — OFFICE VISIT (OUTPATIENT)
Dept: PAIN MEDICINE | Facility: CLINIC | Age: 52
End: 2017-12-14

## 2017-12-14 VITALS
DIASTOLIC BLOOD PRESSURE: 86 MMHG | OXYGEN SATURATION: 94 % | SYSTOLIC BLOOD PRESSURE: 137 MMHG | HEART RATE: 101 BPM | TEMPERATURE: 98.2 F | RESPIRATION RATE: 16 BRPM | HEIGHT: 66 IN

## 2017-12-14 DIAGNOSIS — G89.3 CANCER ASSOCIATED PAIN: Primary | ICD-10-CM

## 2017-12-14 DIAGNOSIS — M54.42 CHRONIC BILATERAL LOW BACK PAIN WITH BILATERAL SCIATICA: ICD-10-CM

## 2017-12-14 DIAGNOSIS — G89.29 CHRONIC BILATERAL LOW BACK PAIN WITH BILATERAL SCIATICA: ICD-10-CM

## 2017-12-14 DIAGNOSIS — M54.41 CHRONIC BILATERAL LOW BACK PAIN WITH BILATERAL SCIATICA: ICD-10-CM

## 2017-12-14 DIAGNOSIS — Z97.8 PRESENCE OF INTRATHECAL PUMP: ICD-10-CM

## 2017-12-14 PROCEDURE — 99214 OFFICE O/P EST MOD 30 MIN: CPT | Performed by: NURSE PRACTITIONER

## 2017-12-14 PROCEDURE — 62369 ANAL SP INF PMP W/REPRG&FILL: CPT | Performed by: NURSE PRACTITIONER

## 2017-12-14 NOTE — PROGRESS NOTES
CHIEF COMPLAINT  Pt continues with basically unchanged bilateral mid to lower back pain,with daily episodes of severe pain.    Subjective   Isha Llanes is a 52 y.o. female  who presents to the office for follow-up.She has a history of chronic back pain. Patient was last evaluated by Dr. Rios on 11/13/17.  She presented for a pump refill.  He continued to titrate the IT pump up.  Increase the basal rate from 4.2/2.1 mg per day up to 6/3 mg per day.  The patient was also given a 2 hour lockout at 0.5 mg/0.25 mg per dose with a maximum 2 doses per day.  She was also prescribed hydrocodone 10-3 25 one by mouth twice a day when necessary for severe pain.    She presents today complaining of severe back pain. States her pain is 8/10VAS but has difficulty staying awake. Describes the pain as continuous and worse.  She states her pain was so much worse, she has been taking the Hydrocodone prescribed at last office visit every 4 hours. Has been without for several days.  I reviewed with her that she expedited the use of the medication without the consent or knowledge of the office or Dr. Rios.  I reviewed the instructions from the last office visit. She said I was wrong about that, but she did not have the bottle to show me. I did tell her that the instructions that I was telling her was how the pharmacy had filled it as well because they had a quantity of 60 as a 30 day supply.     She notes NO improvement in her pain with the increase of IT pump at her last office visit. She states that she thinks that the only thing helps her pain is Hydrocodone. She said the Hydrocodone is stronger than what is in her pump. I tried to review a morphine equivalency with her but she kept nodding off. She states she has been falling asleep and falling out of chairs while sitting there. She had difficult concentrating, opening her eyes and was slurring her words. I had to ask her several times to repeat herself because I could  "not understand what she was saying because she was slurring.  She seems hyper-fixated on the Hydrocodone prescription. I reviewed that we would not be renewing the Hydrocodone prescription today as she had expedited the use of her medication and she also seemed overly sedated.  The plan today was to decrease the pump and discontinue oral hydrocodone. She was very upset by this. I reviewed with her that in place of the Hydrocodone, she could use the PTM of the bump, that Dr. Rios had set it so she could use it up to 10 times per day.     She states \"what am I supposed to do if i'm in severe pain?\" I again reminded her to use the PTM. She states \"The pump is supposed to make my pain go away and it's not. i'm supposed to be more active.\" I reviewed that the pump does not change the internal structure of her body and that it will not mitigate the pathologies she has. However, the pump is there to help decrease her pain and increase her quality of life. She said \"it doesn't, I should be chasing my grandbabies.\" I again reviewed that the pump and its medication would not change her spinal problems physically,but rather help her cope better with the pain. She states \"The hydrocodone does better.\" I tried to review appropriate expectations related to the pump, but again, she was nodding off.     I reviewed that I have serious concerns about her being over-sedated. While she in the office, we found three used cigarettes on her person that fell onto the floor at different points. I expressed serious concerns that she was lighting the cigarettes and falling asleep while smoking, which is a very grave danger. She had multiple areas of svetlana and tobacco on her, including on her skin. I tried to discuss this with her, at least to include decreasing smoking or possibly quitting and she said \"no I won't.\"     We also found a pill on the foot part of her wheelchair.     Back Pain   This is a chronic problem. The current episode " started more than 1 year ago. The problem occurs constantly. The pain is present in the lumbar spine. The quality of the pain is described as aching, burning and shooting. The pain radiates to the left foot and right foot. The pain is at a severity of 8/10 (ranges from 6-8/10VAS). The pain is the same all the time. Exacerbated by: all activity. Associated symptoms include numbness (bilateral legs) and weakness (bilateral legs). Pertinent negatives include no bladder incontinence, bowel incontinence or dysuria. Risk factors include history of cancer, lack of exercise and sedentary lifestyle. She has tried analgesics (IT pump, hydrocodone-moderate) for the symptoms. The treatment provided mild relief.      PEG Assessment   What number best describes your pain on average in the past week?8  What number best describes how, during the past week, pain has interfered with your enjoyment of life?8  What number best describes how, during the past week, pain has interfered with your general activity?  8    The following portions of the patient's history were reviewed and updated as appropriate: allergies, current medications, past family history, past medical history, past social history, past surgical history and problem list.    Review of Systems   Constitutional: Positive for fatigue. Negative for activity change (decreasedremains limited) and appetite change.   HENT: Negative for congestion.    Eyes: Negative for visual disturbance.   Respiratory: Negative for apnea, cough, chest tightness, shortness of breath and wheezing.    Cardiovascular: Positive for leg swelling (improvement).   Gastrointestinal: Positive for nausea (daily). Negative for bowel incontinence, constipation and diarrhea.   Genitourinary: Negative for bladder incontinence, difficulty urinating (incontinence since 2 weeks ago,not daily) and dysuria.   Musculoskeletal: Positive for back pain (bilateral leg pain as well).   Neurological: Positive for  "weakness (bilateral legs) and numbness (bilateral legs). Negative for dizziness and light-headedness (when standing upright).   Psychiatric/Behavioral: Positive for dysphoric mood and sleep disturbance. Negative for suicidal ideas. The patient is nervous/anxious.        Vitals:    12/14/17 1406   BP: 137/86   Pulse: 101   Resp: 16   Temp: 98.2 °F (36.8 °C)   SpO2: 94%   Height: 167 cm (65.75\")   PainSc: 8  Comment: LBP,mid back pain ranges from 6-8/10   PainLoc: Back     Objective   Physical Exam   Constitutional: She is oriented to person, place, and time. She appears well-developed and well-nourished.   HENT:   Head: Normocephalic and atraumatic.   Eyes:   Pinpoint pupils. Difficulty keeping eyes open and little eye contact   Cardiovascular: Normal rate.    Pulmonary/Chest: Effort normal. No respiratory distress.   Abdominal:   Pump in LLQ   Musculoskeletal:        Lumbar back: She exhibits tenderness, bony tenderness and pain.   Surgical scar of lumbar spine     Neurological: She is alert and oriented to person, place, and time. No cranial nerve deficit. Gait (uses motorized wheelchair) abnormal.   Skin: Skin is warm, dry and intact.   Psychiatric: Her affect is blunt. Her speech is delayed and slurred. She is slowed. She is inattentive.     Intrathecal pump was interrogated in office today. Results are in chart.     The patient is currently at a dose of 6/3 mg as above of hydromorphone/ropivacaine per day. There is the addition of PTM bolus.      Under sterile technique, the pump was accessed using a [employer] proprietary refill kit. The volume was withdrawn from the pump. The expected residual volume of the pump was 9.3ml. The actual residual volume was 7.4.     The pump was then refilled with 20 ml of the same concentration of  Hydromorphone/ropivicaine.20 mg/ml.  Changes were made as noted below.        Patient tolerated procedure well. Minimal bleeding was noted. Pump site remains intact with no evidence of " erythema or drainage.     Patient will return for pump refill when due or sooner if needed.       Assessment/Plan   sIha was seen today for back pain.    Diagnoses and all orders for this visit:    Cancer associated pain  -     Oral Fluid Drug Screen - Saliva, Oral Cavity; Future    Chronic bilateral low back pain with bilateral sciatica  -     Oral Fluid Drug Screen - Saliva, Oral Cavity; Future    Presence of intrathecal pump  -     Oral Fluid Drug Screen - Saliva, Oral Cavity; Future          --- we will refill pump today. Will decrease basal rate to 5.5 mg/day. Will continue with PTM as previously ordered by Dr. Rios.  --- will monitor expected versus actual residual pump findings.  --- In lieu of today's findings, I do not feel comfortable continuing with prescription of Hydrocodone. The patient expedited the use of Hydrocodone and showed signs of over-sedation in office today.  At this time, Hydrocodone will be discontinued.   --- I tried to review at length with patient about expectations of pump. Communication was hard due to patient's sedation.  --- Tried to review safety related to cigarette use.  --- An oral drug screen was obtained in office today. Patient was unable to void.   --- Follow-up for pump refill.          TERRELL REPORT    TERRELL report has been reviewed and scanned into the patient's chart.    Date of last TERRELL : 12-13-17          EMR Dragon/Transcription disclaimer:   Much of this encounter note is an electronic transcription/translation of spoken language to printed text. The electronic translation of spoken language may permit erroneous, or at times, nonsensical words or phrases to be inadvertently transcribed; Although I have reviewed the note for such errors, some may still exist.

## 2018-01-02 ENCOUNTER — TELEPHONE CONVERTED (OUTPATIENT)
Dept: ONCOLOGY | Facility: HOSPITAL | Age: 53
End: 2018-01-02

## 2018-01-02 ENCOUNTER — TELEPHONE (OUTPATIENT)
Dept: PAIN MEDICINE | Facility: CLINIC | Age: 53
End: 2018-01-02

## 2018-01-02 NOTE — TELEPHONE ENCOUNTER
"Pt called and stated pain is not better. She has an apt to be seen on the 17th for pain pump refill. Pt asked if we could \"put hydrocodone\" in her pump. She states she will make her apt on the 17th where we can discuss things further then. Blanca ESCOBAR is aware of this phone call.   "

## 2018-01-03 ENCOUNTER — TELEPHONE CONVERTED (OUTPATIENT)
Dept: ONCOLOGY | Facility: HOSPITAL | Age: 53
End: 2018-01-03

## 2018-01-09 ENCOUNTER — OFFICE VISIT CONVERTED (OUTPATIENT)
Dept: ONCOLOGY | Facility: HOSPITAL | Age: 53
End: 2018-01-09
Attending: INTERNAL MEDICINE

## 2018-01-17 ENCOUNTER — OFFICE VISIT (OUTPATIENT)
Dept: PAIN MEDICINE | Facility: CLINIC | Age: 53
End: 2018-01-17

## 2018-01-17 VITALS
RESPIRATION RATE: 16 BRPM | OXYGEN SATURATION: 93 % | HEIGHT: 66 IN | HEART RATE: 86 BPM | DIASTOLIC BLOOD PRESSURE: 69 MMHG | SYSTOLIC BLOOD PRESSURE: 106 MMHG | TEMPERATURE: 97.2 F

## 2018-01-17 DIAGNOSIS — G89.29 CHRONIC BILATERAL LOW BACK PAIN WITH BILATERAL SCIATICA: ICD-10-CM

## 2018-01-17 DIAGNOSIS — M53.3 SI (SACROILIAC) JOINT DYSFUNCTION: ICD-10-CM

## 2018-01-17 DIAGNOSIS — M54.42 CHRONIC BILATERAL LOW BACK PAIN WITH BILATERAL SCIATICA: ICD-10-CM

## 2018-01-17 DIAGNOSIS — M54.41 CHRONIC BILATERAL LOW BACK PAIN WITH BILATERAL SCIATICA: ICD-10-CM

## 2018-01-17 DIAGNOSIS — Z97.8 PRESENCE OF INTRATHECAL PUMP: ICD-10-CM

## 2018-01-17 DIAGNOSIS — G89.3 CANCER ASSOCIATED PAIN: Primary | ICD-10-CM

## 2018-01-17 DIAGNOSIS — M46.1 SACROILIAC INFLAMMATION (HCC): ICD-10-CM

## 2018-01-17 PROCEDURE — 99214 OFFICE O/P EST MOD 30 MIN: CPT | Performed by: NURSE PRACTITIONER

## 2018-01-17 PROCEDURE — 62369 ANAL SP INF PMP W/REPRG&FILL: CPT | Performed by: NURSE PRACTITIONER

## 2018-01-17 RX ORDER — ALPRAZOLAM 1 MG/1
TABLET ORAL
Refills: 0 | COMMUNITY
Start: 2018-01-15 | End: 2018-12-13

## 2018-01-17 NOTE — PROGRESS NOTES
"CHIEF COMPLAINT  F/U back and leg pain, here for pain pump refill, last visit 12-14-17. States pain is worse and she is having trouble standing up straight.     Subjective   Isha Llanes is a 52 y.o. female  who presents to the office for follow-up.She has a history of chronic back and leg pain, as well as a history of cancer previously.     Complains of pain in her low back. Today her pain is 7/10VAS. Describes the pain as continuous and worse. Pain increases with walking, standing; pain decreases with \"only hydrocodone.\" Denies any side effects from the pump/current regimen. ADL's by self.     Motorized wheelchair is broken.    She has had bilateral SI joint injections previously and noted moderate to significant relief with this.    Back Pain   This is a chronic problem. The current episode started more than 1 year ago. The problem occurs constantly. Progression since onset: worse since last office visit. The pain is present in the lumbar spine. The quality of the pain is described as aching, burning and shooting. The pain radiates to the left foot and right foot. The pain is at a severity of 7/10 (ranges from 6-8/10VAS). The pain is the same all the time. Exacerbated by: all activity. Associated symptoms include numbness (bilateral legs) and weakness (bilateral legs). Pertinent negatives include no bladder incontinence, bowel incontinence, dysuria, fever or headaches. Risk factors include history of cancer, lack of exercise and sedentary lifestyle. She has tried analgesics (IT pump, hydrocodone-moderate) for the symptoms. The treatment provided mild relief.      PEG Assessment   What number best describes your pain on average in the past week?7  What number best describes how, during the past week, pain has interfered with your enjoyment of life?8  What number best describes how, during the past week, pain has interfered with your general activity?  7-8    The following portions of the patient's history " "were reviewed and updated as appropriate: allergies, current medications, past family history, past medical history, past social history, past surgical history and problem list.    Review of Systems   Constitutional: Positive for fatigue. Negative for activity change (decreasedremains limited), appetite change, chills and fever.   HENT: Negative for congestion.    Eyes: Negative for visual disturbance.   Respiratory: Negative for apnea, cough, chest tightness, shortness of breath and wheezing.    Cardiovascular: Positive for leg swelling (improvement).   Gastrointestinal: Positive for nausea (daily). Negative for bowel incontinence, constipation and diarrhea (pt states soft but not diarrhea).   Genitourinary: Negative for bladder incontinence, difficulty urinating (incontinence since 2 weeks ago,not daily) and dysuria.   Musculoskeletal: Positive for back pain (bilateral leg pain as well).   Neurological: Positive for weakness (bilateral legs) and numbness (bilateral legs). Negative for dizziness, light-headedness (when standing upright) and headaches.   Psychiatric/Behavioral: Positive for agitation, dysphoric mood and sleep disturbance. Negative for suicidal ideas. The patient is nervous/anxious.        Vitals:    01/17/18 1134   BP: 106/69   Pulse: 86   Resp: 16   Temp: 97.2 °F (36.2 °C)   SpO2: 93%   Weight: Comment: pt refused to weigh   Height: 167 cm (65.75\")   PainSc:   7   PainLoc: Back  Comment: and legs     Objective   Physical Exam   Constitutional: She is oriented to person, place, and time. She appears well-developed and well-nourished.   HENT:   Head: Normocephalic and atraumatic.   Eyes:   Improved eye contact from last office visit   Cardiovascular: Normal rate.    Pulmonary/Chest: Effort normal. No respiratory distress.   Abdominal:   Pump in LLQ   Musculoskeletal:        Lumbar back: She exhibits tenderness, bony tenderness and pain.   Surgical scar of lumbar spine    Exquisite tenderness of " bilateral SI joints    Unable to perform BASILIO due to patient habitus and pain     Neurological: She is alert and oriented to person, place, and time. No cranial nerve deficit. Gait (uses motorized wheelchair) abnormal.   Skin: Skin is warm, dry and intact.   Psychiatric: Her affect is blunt. Her speech is delayed and slurred. She is slowed. She is inattentive.       Intrathecal pump was interrogated in office today. Results are in chart.      The patient is currently at a dose of 5.2/2.6 mg as above of hydromorphone/ropivacaine per day. There is the addition of PTM bolus.       Under sterile technique, the pump was accessed using a [employer] proprietary refill kit. The volume was withdrawn from the pump. The expected residual volume of the pump was 9ml. The actual residual volume was 8 ml.     The pump was then refilled with 20 ml of the same concentration of  Hydromorphone/ropivicaine.20 mg/ml.  No changes were made.        Patient tolerated procedure well. Minimal bleeding was noted. Pump site remains intact with no evidence of erythema or drainage.      Patient will return for pump refill when due or sooner if needed.    Assessment/Plan   Isha was seen today for back pain.    Diagnoses and all orders for this visit:    Cancer associated pain    Chronic bilateral low back pain with bilateral sciatica    Presence of intrathecal pump    Sacroiliac inflammation  -     Case Request    SI (sacroiliac) joint dysfunction  -     Case Request      --- Refill Pump. Reviewed with Dr. Rios. No plans to increase or re-start Hydrocodone. Patient was upset but verbalized understanding.  --- Repeat bilateral SI joint injection. No blood thinners. Reviewed the procedure at length with the patient.  Included in the review was expectations, complications, risk and benefits.The procedure was described in detail and the risks, benefits and alternatives were discussed with the patient (including but not limited to: bleeding,  infection, nerve damage, worsening of pain, inability to perform injection, paralysis, seizures, and death) who agreed to proceed.  Discussed the potential for sedation if warranted/wanted.  Questions were answered and in a way the patient could understand.  Patient verbalized understanding and wishes to proceed.  This intervention will be ordered.  -- Discussed partaking in PT after injections to work on strengthening.   --- Follow-up for pump refill or sooner if needed.       TERRELL REPORT    As part of the patient's treatment plan, I am prescribing controlled substances. The patient has been made aware of appropriate use of such medications, including potential risk of somnolence, limited ability to drive and/or work safely, and the potential for dependence or overdose. It has also bee made clear that these medications are for use by this patient only, without concomitant use of alcohol or other substances unless prescribed.     Patient has completed prescribing agreement detailing terms of continued prescribing of controlled substances, including monitoring TERRELL reports, urine drug screening, and pill counts if necessary. The patient is aware that inappropriate use will results in cessation of prescribing such medications.    TERRELL report has been reviewed and scanned into the patient's chart.    Date of last TERRELL : 1-16-18    History and physical exam exhibit continued safe and appropriate use of controlled substances.      EMR Dragon/Transcription disclaimer:   Much of this encounter note is an electronic transcription/translation of spoken language to printed text. The electronic translation of spoken language may permit erroneous, or at times, nonsensical words or phrases to be inadvertently transcribed; Although I have reviewed the note for such errors, some may still exist.

## 2018-02-08 ENCOUNTER — OFFICE VISIT CONVERTED (OUTPATIENT)
Dept: ONCOLOGY | Facility: HOSPITAL | Age: 53
End: 2018-02-08
Attending: INTERNAL MEDICINE

## 2018-02-22 ENCOUNTER — OFFICE VISIT (OUTPATIENT)
Dept: PAIN MEDICINE | Facility: CLINIC | Age: 53
End: 2018-02-22

## 2018-02-22 VITALS
BODY MASS INDEX: 29.57 KG/M2 | SYSTOLIC BLOOD PRESSURE: 100 MMHG | HEIGHT: 66 IN | HEART RATE: 92 BPM | WEIGHT: 184 LBS | RESPIRATION RATE: 14 BRPM | TEMPERATURE: 98.3 F | OXYGEN SATURATION: 95 % | DIASTOLIC BLOOD PRESSURE: 67 MMHG

## 2018-02-22 DIAGNOSIS — M54.42 CHRONIC BILATERAL LOW BACK PAIN WITH BILATERAL SCIATICA: ICD-10-CM

## 2018-02-22 DIAGNOSIS — G89.29 CHRONIC BILATERAL LOW BACK PAIN WITH BILATERAL SCIATICA: ICD-10-CM

## 2018-02-22 DIAGNOSIS — M53.3 SACROILIAC JOINT DYSFUNCTION: ICD-10-CM

## 2018-02-22 DIAGNOSIS — G89.29 OTHER CHRONIC PAIN: Primary | ICD-10-CM

## 2018-02-22 DIAGNOSIS — M54.41 CHRONIC BILATERAL LOW BACK PAIN WITH BILATERAL SCIATICA: ICD-10-CM

## 2018-02-22 DIAGNOSIS — M46.1 SACROILIAC INFLAMMATION (HCC): ICD-10-CM

## 2018-02-22 DIAGNOSIS — Z97.8 PRESENCE OF INTRATHECAL PUMP: ICD-10-CM

## 2018-02-22 PROCEDURE — 62368 ANALYZE SP INF PUMP W/REPROG: CPT | Performed by: NURSE PRACTITIONER

## 2018-02-22 PROCEDURE — 99214 OFFICE O/P EST MOD 30 MIN: CPT | Performed by: NURSE PRACTITIONER

## 2018-02-22 NOTE — PROGRESS NOTES
"CHIEF COMPLAINT  F/U back and joint pain. Pt states she has not changed and pain still increased and she can not walk because of her joint pain.     Subjective   Isha Llanes is a 52 y.o. female  who presents to the office for follow-up.She has a history of chronic back and joint pain. Reports her pain as being worse since last office visit. SHe did not proceed with SI joint injections as previously ordered.    Complains of pain in her back and legs and joints. Today her pain is 8/10VAS. Describes the pain as continuous and worse. Continues with IT pump. Believes the Naropin in the IT pump is causing her to have drowsiness. Denies any other side effects from the regimen. SHe has difficulty stating how much the pump helps her pain. \"I'm thinking about just taking this out and taking pain pills.\" I reviewed that oral opioids would not be an option at this clinic. Recent death in family (grandfather-in-law).    Is having dizziness, 'I think it's the Naropin.\" \"It could be my sugars.\" Is not taking insulin. \"I have to call the sugar doctor.\"  Continues to lean forward and almost slumps out of chair multiple times. She is arrousable when her name is called. Pupils are again pinpoint and she can only keep one eye open when talking to her. Slumps forward in wheelchair when not interacting with others. Closes eyes.     Back Pain   This is a chronic problem. The current episode started more than 1 year ago. The problem occurs constantly. Progression since onset: worse since last office visit. The pain is present in the lumbar spine. The quality of the pain is described as aching, burning and shooting. The pain radiates to the left foot and right foot. The pain is at a severity of 8/10 (ranges from 6-8/10VAS). The pain is the same all the time. Exacerbated by: all activity. Associated symptoms include numbness (bilateral legs) and weakness (bilateral legs). Pertinent negatives include no bladder incontinence, bowel " incontinence, dysuria, fever or headaches. Risk factors include history of cancer, lack of exercise and sedentary lifestyle. She has tried analgesics (IT pump, SI joint injection) for the symptoms. The treatment provided mild relief.      PEG Assessment   What number best describes your pain on average in the past week?8  What number best describes how, during the past week, pain has interfered with your enjoyment of life?8  What number best describes how, during the past week, pain has interfered with your general activity?  7    The following portions of the patient's history were reviewed and updated as appropriate: allergies, current medications, past family history, past medical history, past social history, past surgical history and problem list.    Review of Systems   Constitutional: Positive for fatigue (pt appears drowsy). Negative for activity change (decreasedremains limited), appetite change, chills and fever.   HENT: Negative for congestion.    Eyes: Negative for visual disturbance.   Respiratory: Negative for apnea, cough, chest tightness, shortness of breath and wheezing.    Cardiovascular: Positive for leg swelling (improvement).   Gastrointestinal: Positive for nausea (daily). Negative for bowel incontinence, constipation and diarrhea (pt states soft but not diarrhea).   Genitourinary: Negative for bladder incontinence, difficulty urinating and dysuria.   Musculoskeletal: Positive for back pain (bilateral leg pain as well).   Neurological: Positive for weakness (bilateral legs) and numbness (bilateral legs). Negative for dizziness, light-headedness (when standing upright) and headaches.   Psychiatric/Behavioral: Positive for agitation, dysphoric mood and sleep disturbance. Negative for suicidal ideas. The patient is nervous/anxious.        Vitals:    02/22/18 0921   BP: 100/67   Pulse: 92   Resp: 14   Temp: 98.3 °F (36.8 °C)   SpO2: 95%   Weight: 83.5 kg (184 lb)  Comment: pt refused to weigh,  "stated this   Height: 167 cm (65.75\")   PainSc:   8   PainLoc: Back  Comment: and legs     Objective   Physical Exam   Constitutional: She is oriented to person, place, and time. She appears well-developed and well-nourished.   HENT:   Head: Normocephalic and atraumatic.   Eyes: Conjunctivae are normal.   Pinpoint pupils. Only opens one eye when talking   Cardiovascular: Normal rate.    Pulmonary/Chest: Effort normal. No respiratory distress.   Abdominal:   Pump in LLQ   Musculoskeletal:        Lumbar back: She exhibits tenderness, bony tenderness and pain.   Surgical scar of lumbar spine    Exquisite tenderness of bilateral SI joints    Unable to perform BASILIO due to patient habitus and pain     Neurological: She is alert and oriented to person, place, and time. No cranial nerve deficit. Gait (uses motorized wheelchair) abnormal.   Skin: Skin is warm, dry and intact.   Psychiatric: Her affect is blunt. Her speech is delayed and slurred. She is slowed. She is inattentive.       Assessment/Plan   Isha was seen today for back pain and extremity pain.    Diagnoses and all orders for this visit:    Other chronic pain    Sacroiliac joint dysfunction  -     Ambulatory Referral to Physical Therapy Evaluate and treat    Sacroiliac inflammation  -     Ambulatory Referral to Physical Therapy Evaluate and treat    Chronic bilateral low back pain with bilateral sciatica  -     Ambulatory Referral to Physical Therapy Evaluate and treat    Presence of intrathecal pump      --- Schedule SI joint injection. . Reviewed the procedure at length with the patient.  Included in the review was expectations, complications, risk and benefits.The procedure was described in detail and the risks, benefits and alternatives were discussed with the patient (including but not limited to: bleeding, infection, nerve damage, worsening of pain, inability to perform injection, paralysis, seizures, and death) who agreed to proceed.  Discussed the " potential for sedation if warranted/wanted.  Questions were answered and in a way the patient could understand.  Patient verbalized understanding and wishes to proceed.  This intervention will be ordered.  --- Refer to PT. Discussed deconditioning exacerbating her pain and somnolence.  --- Not due for pump refill today. Will decrease to 5 mg/day due to somnolent activity in office. ALso BP and O2 are low, though not critical. Patient makes little eye contact. She is argumentative and states she's going to get the pump taken out so we can give her pills. I again reviewed that this was not the plan with this clinic.   --- Decrease Naropin for next pump refill to 5 mg/ml. Orders noted for next refill.  --- Follow-up for pump refill or sooner if needed.       TERRELL REPORT      TERRELL report has been reviewed and scanned into the patient's chart.    Date of last TERRELL : 2-15-18        EMR Dragon/Transcription disclaimer:   Much of this encounter note is an electronic transcription/translation of spoken language to printed text. The electronic translation of spoken language may permit erroneous, or at times, nonsensical words or phrases to be inadvertently transcribed; Although I have reviewed the note for such errors, some may still exist.

## 2018-02-28 ENCOUNTER — TELEPHONE CONVERTED (OUTPATIENT)
Dept: ONCOLOGY | Facility: HOSPITAL | Age: 53
End: 2018-02-28

## 2018-03-06 ENCOUNTER — OFFICE VISIT (OUTPATIENT)
Dept: PAIN MEDICINE | Facility: CLINIC | Age: 53
End: 2018-03-06

## 2018-03-06 VITALS
DIASTOLIC BLOOD PRESSURE: 80 MMHG | RESPIRATION RATE: 16 BRPM | TEMPERATURE: 98.2 F | SYSTOLIC BLOOD PRESSURE: 125 MMHG | HEIGHT: 66 IN | OXYGEN SATURATION: 95 % | HEART RATE: 75 BPM

## 2018-03-06 DIAGNOSIS — M46.1 SACROILIAC INFLAMMATION (HCC): ICD-10-CM

## 2018-03-06 DIAGNOSIS — Z97.8 PRESENCE OF INTRATHECAL PUMP: ICD-10-CM

## 2018-03-06 DIAGNOSIS — G89.4 CHRONIC PAIN SYNDROME: Primary | ICD-10-CM

## 2018-03-06 DIAGNOSIS — G89.29 CHRONIC BILATERAL LOW BACK PAIN WITH BILATERAL SCIATICA: ICD-10-CM

## 2018-03-06 DIAGNOSIS — M53.3 SACROILIAC JOINT DYSFUNCTION: ICD-10-CM

## 2018-03-06 DIAGNOSIS — M54.42 CHRONIC BILATERAL LOW BACK PAIN WITH BILATERAL SCIATICA: ICD-10-CM

## 2018-03-06 DIAGNOSIS — M54.41 CHRONIC BILATERAL LOW BACK PAIN WITH BILATERAL SCIATICA: ICD-10-CM

## 2018-03-06 PROCEDURE — 62369 ANAL SP INF PMP W/REPRG&FILL: CPT | Performed by: NURSE PRACTITIONER

## 2018-03-06 NOTE — PROGRESS NOTES
"CHIEF COMPLAINT  Pt here for pump medicine adjustment/refill.  Pt states LBP bilaterally is \"severe,somewhat worse\" since last office visit.  Pt also re started \"an antibiotic last night\" due to severe leg pain,without orders to do so per her PCP.    Subjective   Isha Llanes is a 52 y.o. female  who presents to the office for follow-up.She has a history of chronic low back pain and presence of IT pain pump.  She presents today for refill of intrathecal medication.  PEr her previous request, she had the NAropin decreased in her pump. SHe had stated she believed this was causing drowsiness. However, today she presents having been off Xanax for a few days. She reports she thinks the Xanax was causing her somnolence. She is planning on re-starting the Xanax but at a lower dose.  Also at her last office visit, she was ordered to have SI joint injection. This is scheduled in two days.     Complains of pain in her back, legs and \"all over.\" \"I have cancer.\" Today her pain is 8/10VAS.  Describes the pain as continuous and worse. Pain increases with walking, standing and movement; pain decreases with \"nothing.\"  She reports no current side effects from the IT pump. \"I don't think it's helping me.\"  I reviewed that we decreased her pump at last office visit due to somnolence and her pain worsened, which may indicate that it is in fact helping her. \"Why don't you want to help me?\" I reviewed with her that we were working on helping her, but we would not abandon her basic life requirements. I expressed concerns about overdosing, sedation and interaction with benzodiazepenes. If it is the Xanax causing her sedation, re-starting would limit our abilities to control her pain with the IT pump, specifically increasing doses.  I also reviewed that we have scheduled an SI joint injection and changed the medication per her request.  SHe asked for \"breakthrough pain medication,\" specifically Hydrocodone.  I reviewed that she had " "previously self-medicated and had taken Oral Hydrocodone inappropriately previously. Also reviewed that she had PTM and only used that 25 times since last office visit, where as she could use it up to every 2 hours.  \"I just lay and cry and scream.\" I reviewed again that we are trying to help with her pain status, but this is the first time she has not been overly somnolent in the past few months. I highly cautioned against re-starting Xanax, as this could cause sedation, which would further hinder our treatment with pain medication. I reviewed that it was more important to keep her alive and not over-dose her or cause interactions with other medications.     She states she re-started Clindamycin last night for abscesses on her lower extremity. I reviewed that Dr. Rios may not proceed with SI joint injection if she is on antibiotics at this time. She became angry and said \"he has to.\" \"I already set up the rides and injections.\" I reviewed that if Dr. Rios did not feel it was in her best interest, he would not proceed with the injections with an active infection if he felt it was inappropriate.     AS she was leaving she stated she just wants the pump taken out.    Continuous rate was reduced at previous office visit due to somnolence.      Back Pain   This is a chronic problem. The current episode started more than 1 year ago. The problem occurs constantly. Progression since onset: worse since last office visit. The pain is present in the lumbar spine. The quality of the pain is described as aching, burning and shooting. The pain radiates to the left foot and right foot. The pain is at a severity of 8/10 (ranges from 6-8/10VAS). The pain is the same all the time. Exacerbated by: all activity. Associated symptoms include numbness (bilateral legs) and weakness (bilateral legs). Pertinent negatives include no bladder incontinence, bowel incontinence, dysuria, fever or headaches. Risk factors include history of " "cancer, lack of exercise and sedentary lifestyle. She has tried analgesics (IT pump, SI joint injection) for the symptoms. The treatment provided mild relief.        PEG Assessment   What number best describes your pain on average in the past week?8  What number best describes how, during the past week, pain has interfered with your enjoyment of life?8  What number best describes how, during the past week, pain has interfered with your general activity?  9      The following portions of the patient's history were reviewed and updated as appropriate: allergies, current medications, past family history, past medical history, past social history, past surgical history and problem list.    Review of Systems   Constitutional: Negative for activity change (decreasedremains limited), appetite change, chills and fever. Fatigue: pt appears less drowsy.   HENT: Negative for congestion.    Eyes: Negative for visual disturbance.   Respiratory: Negative for apnea, cough, chest tightness, shortness of breath and wheezing.    Cardiovascular: Positive for leg swelling (improvement).   Gastrointestinal: Positive for nausea (mild). Negative for bowel incontinence, constipation and diarrhea (pt states soft but not diarrhea).   Genitourinary: Negative for bladder incontinence, difficulty urinating and dysuria.   Musculoskeletal: Positive for back pain (bilateral leg pain as well).   Neurological: Positive for weakness (bilateral legs) and numbness (bilateral legs). Negative for dizziness, light-headedness (when standing upright) and headaches.   Psychiatric/Behavioral: Positive for agitation, dysphoric mood and sleep disturbance. Negative for suicidal ideas. Confusion: Pt more alert today. The patient is nervous/anxious.        Vitals:    03/06/18 0944   BP: 125/80   Pulse: 75   Resp: 16   Temp: 98.2 °F (36.8 °C)   SpO2: 95%   Height: 167 cm (65.75\")   PainSc: 8  Comment: LBP bilaterally ranges from 7-9/10   PainLoc: Back "       Objective   Physical Exam   Constitutional: She is oriented to person, place, and time. She appears well-developed and well-nourished.   HENT:   Head: Normocephalic and atraumatic.   Eyes: Conjunctivae are normal.   Pinpoint pupils. Only opens one eye when talking   Cardiovascular: Normal rate.    Pulmonary/Chest: Effort normal. No respiratory distress.   Abdominal:   Pump in LLQ   Musculoskeletal:        Lumbar back: She exhibits tenderness, bony tenderness and pain.   Surgical scar of lumbar spine    Exquisite tenderness of bilateral SI joints    Unable to perform BASILIO due to patient habitus and pain     Neurological: She is alert and oriented to person, place, and time. No cranial nerve deficit. Gait (uses motorized wheelchair) abnormal.   Skin: Skin is warm, dry and intact.   Psychiatric: Her affect is blunt. Her speech is delayed and slurred. She is slowed. She is inattentive.     Intrathecal pump was interrogated in office today. Results are in chart.      The patient is currently at a dose of 5.0/2.5 mg as above of hydromorphone/ropivacaine per day. There is the addition of PTM bolus.       Under sterile technique, the pump was accessed using a [employer] proprietary refill kit. The volume was withdrawn from the pump. The expected residual volume of the pump was 4.9ml. The actual residual volume was 5.7 ml.      The pump was then refilled with 20 ml of the same concentration of  Hydromorphone/ropivicaine.20 mg/ 5mg/ml.  No changes were made except to the dose of Naropin appropriately.        Patient tolerated procedure well. Minimal bleeding was noted. Pump site remains intact with no evidence of erythema or drainage.      Patient will return for pump refill when due or sooner if needed.    Assessment/Plan   Isha was seen today for back pain.    Diagnoses and all orders for this visit:    Chronic pain syndrome    Chronic bilateral low back pain with bilateral sciatica    Presence of intrathecal  pump      --- The oral drug screen confirmation from 12-14-17 has been reviewed and the result is ABNORMAL (lacking Hydrocodone, hydromorphone and xanax is appropriate) based on patient history and TERRELL report  --- Decrease Naropin concentration to 5 mg/ml. Continue with previous rate otherwise.  --- Proceed with SI joint injection as scheduled, unless Dr. Rios wants to wait until antibiotic therapy is completed.   --- Recommend not re-starting Xanax.  --- Follow-up for pump refill or sooner if needed.         TERRELL REPORT    As part of the patient's treatment plan, I am prescribing controlled substances. The patient has been made aware of appropriate use of such medications, including potential risk of somnolence, limited ability to drive and/or work safely, and the potential for dependence or overdose. It has also bee made clear that these medications are for use by this patient only, without concomitant use of alcohol or other substances unless prescribed.     Patient has completed prescribing agreement detailing terms of continued prescribing of controlled substances, including monitoring TERRELL reports, urine drug screening, and pill counts if necessary. The patient is aware that inappropriate use will results in cessation of prescribing such medications.    TERRELL report has been reviewed and scanned into the patient's chart.    Date of last TERRELL : 3/5/2018    History and physical exam exhibit continued safe and appropriate use of controlled substances.      EMR Dragon/Transcription disclaimer:   Much of this encounter note is an electronic transcription/translation of spoken language to printed text. The electronic translation of spoken language may permit erroneous, or at times, nonsensical words or phrases to be inadvertently transcribed; Although I have reviewed the note for such errors, some may still exist.

## 2018-03-07 ENCOUNTER — OFFICE VISIT CONVERTED (OUTPATIENT)
Dept: ONCOLOGY | Facility: HOSPITAL | Age: 53
End: 2018-03-07
Attending: INTERNAL MEDICINE

## 2018-03-08 ENCOUNTER — TELEPHONE CONVERTED (OUTPATIENT)
Dept: ONCOLOGY | Facility: HOSPITAL | Age: 53
End: 2018-03-08

## 2018-03-14 ENCOUNTER — TELEPHONE (OUTPATIENT)
Dept: PAIN MEDICINE | Facility: CLINIC | Age: 53
End: 2018-03-14

## 2018-03-14 ENCOUNTER — TELEPHONE CONVERTED (OUTPATIENT)
Dept: ONCOLOGY | Facility: HOSPITAL | Age: 53
End: 2018-03-14

## 2018-03-26 ENCOUNTER — TELEPHONE (OUTPATIENT)
Dept: PAIN MEDICINE | Facility: CLINIC | Age: 53
End: 2018-03-26

## 2018-03-28 ENCOUNTER — TELEPHONE (OUTPATIENT)
Dept: PAIN MEDICINE | Facility: CLINIC | Age: 53
End: 2018-03-28

## 2018-03-28 NOTE — TELEPHONE ENCOUNTER
Ms. Llanes called today and stated that she wanted to see Dr. Rios for her next appt instead of LUZ Mari. She stated that Blanca was rude and argumentive at her last appt. Ms. Llanes's speech was slurred on this phone call and it was hard to understand her. She stated she thought it may be her sugar but hadn't checked it yet. I told her she should check it and she stated she would. She has a siri at home. Her appt has been moved to 4/11/18 with .

## 2018-04-05 ENCOUNTER — OFFICE VISIT CONVERTED (OUTPATIENT)
Dept: ONCOLOGY | Facility: HOSPITAL | Age: 53
End: 2018-04-05
Attending: INTERNAL MEDICINE

## 2018-04-09 ENCOUNTER — TELEPHONE (OUTPATIENT)
Dept: PAIN MEDICINE | Facility: CLINIC | Age: 53
End: 2018-04-09

## 2018-04-11 ENCOUNTER — OFFICE VISIT (OUTPATIENT)
Dept: PAIN MEDICINE | Facility: CLINIC | Age: 53
End: 2018-04-11

## 2018-04-11 VITALS
TEMPERATURE: 97.9 F | OXYGEN SATURATION: 95 % | SYSTOLIC BLOOD PRESSURE: 104 MMHG | RESPIRATION RATE: 16 BRPM | HEART RATE: 98 BPM | DIASTOLIC BLOOD PRESSURE: 71 MMHG | HEIGHT: 66 IN

## 2018-04-11 DIAGNOSIS — G89.4 CHRONIC PAIN SYNDROME: Primary | ICD-10-CM

## 2018-04-11 DIAGNOSIS — G89.3 CANCER ASSOCIATED PAIN: ICD-10-CM

## 2018-04-11 DIAGNOSIS — M54.16 LUMBAR RADICULOPATHY: ICD-10-CM

## 2018-04-11 DIAGNOSIS — Z97.8 PRESENCE OF INTRATHECAL PUMP: ICD-10-CM

## 2018-04-11 DIAGNOSIS — M51.06 INTERVERTEBRAL LUMBAR DISC DISORDER WITH MYELOPATHY, LUMBAR REGION: ICD-10-CM

## 2018-04-11 PROCEDURE — 62367 ANALYZE SPINE INFUS PUMP: CPT | Performed by: ANESTHESIOLOGY

## 2018-04-11 PROCEDURE — 99213 OFFICE O/P EST LOW 20 MIN: CPT | Performed by: ANESTHESIOLOGY

## 2018-04-11 NOTE — PROGRESS NOTES
"CHIEF COMPLAINT  Pt  Is here for intrathecal pump reprogramming--states LBP bilaterally continues to be  \"hard to tolerate,not any better\".    Subjective   Isha Llanes is a 52 y.o. female  who presents to the office for follow-up.She has a history of back pain.  She also has Hodgkins Lymphoma.  She notes progression.  I do not have recent notes from oncologists.      --    Pump interrogation:    This is a 20 mL [employer] pump..  Pump is filled with hydromorphone at 20 mg/mL and ropivacaine at 5 mg/mL.  The pump was infusing ofdose of hydromorphone 5 mg per day with bupivacaine 1.25 mg per day.  The PTM and fusion is 0.5 mg with a 2 hour lockout interval maximum dose restriction of 10 doses in a 24-hour period.  Since last change, which was on March 6, she has had 60 successful activations with 19 lockout, 181 unsuccessful activations.  The end-of-life on the battery is 15 months.  Refill date is April 26.  No changes are made.    --    Back Pain   This is a chronic problem. The current episode started more than 1 year ago. The problem occurs constantly. The problem has been gradually worsening (worse since last office visit and worse & worse each visit) since onset. The pain is present in the lumbar spine. The quality of the pain is described as aching, burning and shooting. The pain radiates to the left foot and right foot. Pain scale: ranges from 6-8/10VAS. The pain is severe. The pain is the same all the time. Exacerbated by: all activity. Associated symptoms include numbness (bilateral legs) and weakness (bilateral legs). Pertinent negatives include no bladder incontinence, bowel incontinence, dysuria, fever or headaches. Risk factors include history of cancer, lack of exercise and sedentary lifestyle. She has tried analgesics (IT pump, SI joint injection) for the symptoms. The treatment provided mild relief.        PEG Assessment   What number best describes your pain on average in the past week?7  What " "number best describes how, during the past week, pain has interfered with your enjoyment of life?8  What number best describes how, during the past week, pain has interfered with your general activity?  8      The following portions of the patient's history were reviewed and updated as appropriate: allergies, current medications, past family history, past medical history, past social history, past surgical history and problem list.    Review of Systems   Constitutional: Positive for activity change (decreasedremains limited\"wheelchair bound\"). Negative for appetite change, chills and fever. Fatigue: pt appears less drowsy.   HENT: Negative for congestion.    Eyes: Negative for visual disturbance.   Respiratory: Negative for apnea, cough, chest tightness, shortness of breath and wheezing.    Cardiovascular: Positive for leg swelling (improvement).   Gastrointestinal: Positive for constipation. Negative for bowel incontinence, diarrhea (pt states soft but not diarrhea) and nausea (mild).   Genitourinary: Negative for bladder incontinence, difficulty urinating and dysuria.   Musculoskeletal: Positive for back pain (bilateral leg pain as well).   Neurological: Positive for weakness (bilateral legs) and numbness (bilateral legs). Negative for dizziness, light-headedness (when standing upright) and headaches.   Psychiatric/Behavioral: Positive for dysphoric mood and sleep disturbance. Negative for agitation and suicidal ideas. Confusion: Pt more alert today. The patient is nervous/anxious.          Vitals:    04/11/18 1249   BP: 104/71   Pulse: 98   Resp: 16   Temp: 97.9 °F (36.6 °C)   SpO2: 95%   Height: 167 cm (65.75\")   PainSc: 7  Comment: LBP centrally to bilaterally ranges from 7-8/10   PainLoc: Back         Objective   Physical Exam   Constitutional: She appears well-developed and well-nourished. She is cooperative. She has a sickly appearance.   She was sleeping when I walked in the room, bent forward in her power " chair (flexed)  The posture looked very painful.  She aroused quickly, popping up to a normal seated position. Alert throughout visit.   Musculoskeletal:   She states that she has extreme difficulty in standing straight....  She can sit up straight, but when standing she is flexed to near 90 degrees and says she cannot extend up past 75 degrees.  Extreme diffuse low back tenderness.  Pain on axial loading at the apex.   Neurological: She is alert.   Nursing note and vitals reviewed.          Assessment/Plan   Isha was seen today for back pain.    Diagnoses and all orders for this visit:    Chronic pain syndrome    Presence of intrathecal pump    Cancer associated pain  -     MRI Lumbar Spine Without Contrast; Future    Intervertebral lumbar disc disorder with myelopathy, lumbar region  -     MRI Lumbar Spine Without Contrast; Future    Lumbar radiculopathy  -     MRI Lumbar Spine Without Contrast; Future        --- Follow-up after imaging  -- get a Lumbar MRI  -- refill for pump is in 2 wks    -- I am concerned because it seems that she has 4/5 on Rivas Signs.  These do NOT rule out organic causes, so more testing is required.  Especially in this cancer patient who is concerned about LN swelling in her neck.      -- pump interrogated as above                   TERRELL REPORT    As part of the patient's treatment plan, I am prescribing intrathecal controlled substances. The patient has been made aware of appropriate use of such medications, including potential risk of somnolence, limited ability to drive and/or work safely, and the potential for dependence or overdose. It has also bee made clear that these medications are for use by this patient only, without concomitant use of alcohol or other substances unless prescribed.     Patient has completed prescribing agreement detailing terms of continued prescribing of controlled substances, including monitoring TERRELL reports, urine drug screening, and pill counts  if necessary. The patient is aware that inappropriate use will results in cessation of prescribing such medications.    TERRELL report has been reviewed and scanned into the patient's chart.    Date of last TERRELL : as above    History and physical exam exhibit continued use of controlled substances.      EMR Dragon/Transcription disclaimer:   Much of this encounter note is an electronic transcription/translation of spoken language to printed text. The electronic translation of spoken language may permit erroneous, or at times, nonsensical words or phrases to be inadvertently transcribed; Although I have reviewed the note for such errors, some may still exist.

## 2018-04-30 ENCOUNTER — OFFICE VISIT (OUTPATIENT)
Dept: PAIN MEDICINE | Facility: CLINIC | Age: 53
End: 2018-04-30

## 2018-04-30 VITALS
HEIGHT: 64 IN | HEART RATE: 65 BPM | SYSTOLIC BLOOD PRESSURE: 126 MMHG | OXYGEN SATURATION: 93 % | BODY MASS INDEX: 29.19 KG/M2 | TEMPERATURE: 97.3 F | DIASTOLIC BLOOD PRESSURE: 87 MMHG | WEIGHT: 171 LBS | RESPIRATION RATE: 18 BRPM

## 2018-04-30 DIAGNOSIS — M53.3 SACROILIAC JOINT DYSFUNCTION: ICD-10-CM

## 2018-04-30 DIAGNOSIS — G89.3 CANCER ASSOCIATED PAIN: ICD-10-CM

## 2018-04-30 DIAGNOSIS — M46.1 SACROILIAC INFLAMMATION (HCC): ICD-10-CM

## 2018-04-30 DIAGNOSIS — Z97.8 PRESENCE OF INTRATHECAL PUMP: ICD-10-CM

## 2018-04-30 DIAGNOSIS — G89.4 CHRONIC PAIN SYNDROME: Primary | ICD-10-CM

## 2018-04-30 PROCEDURE — 62369 ANAL SP INF PMP W/REPRG&FILL: CPT | Performed by: NURSE PRACTITIONER

## 2018-04-30 PROCEDURE — 99213 OFFICE O/P EST LOW 20 MIN: CPT | Performed by: NURSE PRACTITIONER

## 2018-04-30 NOTE — PROGRESS NOTES
"CHIEF COMPLAINT  Back and neck pain has increased since last office visit. She states \"I don't think this pain pump is working the way it is supposed to.\"    Subjective   Isha Llanes is a 52 y.o. female  who presents to the office for follow-up.She has a history of chronic back and neck pain. Reports her pain is worse.   Patient was last evaluated by Dr. Rios on 4/11/18.  Has a history of chronic low back pain and Hodgkin's lymphoma.  She is ordered to get lumbar MRI and follow up afterwards.  Also continue with pump. Has not yet had lumbar MRI though it has been approved.    She did not have SI joint injections, which were ordered by myself on 3-6-18. She has had bilateral SI joint injections previously and noted moderate to significant relief with this.    Complains of pain in her low back and hips. Today her pain is 8/10VAS. Describes the pain as continuous and worsening. Continues with IT pump and PTM. Denies any side effects. Unable to ascertain relief from pump. Is having home PT and home health starting to come to house(has not yet started).     Just finished antibiotics for leg abscess.    Back Pain   This is a chronic problem. The current episode started more than 1 year ago. The problem occurs constantly. Progression since onset: worse since last office visit. The pain is present in the lumbar spine. The quality of the pain is described as aching, burning and shooting. The pain radiates to the left foot and right foot. The pain is at a severity of 8/10. The pain is the same all the time. Exacerbated by: all activity. Associated symptoms include chest pain, headaches, numbness and weakness. Pertinent negatives include no bladder incontinence, bowel incontinence, dysuria or fever. Risk factors include history of cancer, lack of exercise and sedentary lifestyle. She has tried analgesics (IT pump, SI joint injection) for the symptoms. The treatment provided mild relief.      PEG Assessment   What " "number best describes your pain on average in the past week?6  What number best describes how, during the past week, pain has interfered with your enjoyment of life?7  What number best describes how, during the past week, pain has interfered with your general activity?  6    The following portions of the patient's history were reviewed and updated as appropriate: allergies, current medications, past family history, past medical history, past social history, past surgical history and problem list.    Review of Systems   Constitutional: Negative for chills and fever.   Respiratory: Positive for shortness of breath.    Cardiovascular: Positive for chest pain.   Gastrointestinal: Negative for bowel incontinence, constipation, diarrhea, nausea and vomiting.   Genitourinary: Negative for bladder incontinence, difficulty urinating, dyspareunia and dysuria.   Musculoskeletal: Positive for back pain and neck pain.   Neurological: Positive for weakness, numbness and headaches. Negative for dizziness and light-headedness.   Psychiatric/Behavioral: Positive for sleep disturbance. Negative for confusion, hallucinations, self-injury and suicidal ideas. The patient is nervous/anxious.        Vitals:    04/30/18 1014   BP: 126/87   Pulse: 65   Resp: 18   Temp: 97.3 °F (36.3 °C)   SpO2: 93%   Weight: 77.6 kg (171 lb)   Height: 162.6 cm (64\")   PainSc:   8   PainLoc: Back     Objective   Physical Exam   Constitutional: She is oriented to person, place, and time. She appears well-developed and well-nourished.   HENT:   Head: Normocephalic and atraumatic.   Eyes: Conjunctivae are normal.   Cardiovascular: Normal rate.    Pulmonary/Chest: Effort normal.   Abdominal:   Pump in LLQ   Musculoskeletal:        Lumbar back: She exhibits tenderness, bony tenderness and pain.   Surgical scar of lumbar spine    MODERATE tenderness of bilateral SI joints, worse on left than right    Unable to perform BASILIO due to patient habitus and pain   "   Neurological: She is alert and oriented to person, place, and time. No cranial nerve deficit. Gait (uses motorized wheelchair) abnormal.   Skin: Skin is warm, dry and intact.   Psychiatric: She has a normal mood and affect. Her speech is normal and behavior is normal. Thought content normal.       Intrathecal pump was interrogated in office today. Results are in chart.      The patient is currently at a dose of 5.0/2.5 mg as above of hydromorphone/ropivacaine per day. There is the addition of PTM bolus.       Under sterile technique, the pump was accessed using a [employer] proprietary refill kit. The volume was withdrawn from the pump. The expected residual volume of the pump was 4.0ml. The actual residual volume was 2.2 ml.      The pump was then refilled with 20 ml of the same concentration of  Hydromorphone/ropivicaine.20 mg/ 5mg/ml.  No changes were made except to the dose of Naropin appropriately.        Patient tolerated procedure well. Minimal bleeding was noted. Pump site remains intact with no evidence of erythema or drainage.      Patient will return for pump refill when due or sooner if needed.    Assessment/Plan   Isha was seen today for back pain and neck pain.    Diagnoses and all orders for this visit:    Chronic pain syndrome    Cancer associated pain    Sacroiliac joint dysfunction  -     Case Request    Sacroiliac inflammation  -     Case Request    Presence of intrathecal pump      --- Refill Pump. No changes today.  --- Proceed with lumbar MRI as ordered.  --- Bilateral SI joint injection. No blood thinners. Reviewed the procedure at length with the patient.  Included in the review was expectations, complications, risk and benefits.The procedure was described in detail and the risks, benefits and alternatives were discussed with the patient (including but not limited to: bleeding, infection, nerve damage, worsening of pain, inability to perform injection, paralysis, seizures, and death)  who agreed to proceed.  Discussed the potential for sedation if warranted/wanted.  Questions were answered and in a way the patient could understand.  Patient verbalized understanding and wishes to proceed.  This intervention will be ordered.  --- If further discrepancies from ERV and ARV--- PUMP DYE STUDY.  --- Follow-up for pump refill or sooner if needed.       TERRELL REPORT    As part of the patient's treatment plan, I am prescribing controlled substances. The patient has been made aware of appropriate use of such medications, including potential risk of somnolence, limited ability to drive and/or work safely, and the potential for dependence or overdose. It has also bee made clear that these medications are for use by this patient only, without concomitant use of alcohol or other substances unless prescribed.     Patient has completed prescribing agreement detailing terms of continued prescribing of controlled substances, including monitoring TERRELL reports, urine drug screening, and pill counts if necessary. The patient is aware that inappropriate use will results in cessation of prescribing such medications.    TERRELL report has been reviewed and scanned into the patient's chart.    Date of last TERRELL : 4-27-18    History and physical exam exhibit continued safe and appropriate use of controlled substances.      EMR Dragon/Transcription disclaimer:   Much of this encounter note is an electronic transcription/translation of spoken language to printed text. The electronic translation of spoken language may permit erroneous, or at times, nonsensical words or phrases to be inadvertently transcribed; Although I have reviewed the note for such errors, some may still exist.

## 2018-05-01 ENCOUNTER — TELEPHONE (OUTPATIENT)
Dept: PAIN MEDICINE | Facility: CLINIC | Age: 53
End: 2018-05-01

## 2018-05-02 ENCOUNTER — OFFICE VISIT CONVERTED (OUTPATIENT)
Dept: ONCOLOGY | Facility: HOSPITAL | Age: 53
End: 2018-05-02
Attending: INTERNAL MEDICINE

## 2018-05-03 ENCOUNTER — TELEPHONE (OUTPATIENT)
Dept: PAIN MEDICINE | Facility: CLINIC | Age: 53
End: 2018-05-03

## 2018-05-23 ENCOUNTER — APPOINTMENT (OUTPATIENT)
Dept: MRI IMAGING | Facility: HOSPITAL | Age: 53
End: 2018-05-23
Attending: ANESTHESIOLOGY

## 2018-05-29 ENCOUNTER — TELEPHONE (OUTPATIENT)
Dept: PAIN MEDICINE | Facility: CLINIC | Age: 53
End: 2018-05-29

## 2018-05-30 ENCOUNTER — TELEPHONE (OUTPATIENT)
Dept: PAIN MEDICINE | Facility: CLINIC | Age: 53
End: 2018-05-30

## 2018-05-30 ENCOUNTER — APPOINTMENT (OUTPATIENT)
Dept: MRI IMAGING | Facility: HOSPITAL | Age: 53
End: 2018-05-30
Attending: ANESTHESIOLOGY

## 2018-06-04 ENCOUNTER — RESULTS ENCOUNTER (OUTPATIENT)
Dept: PAIN MEDICINE | Facility: CLINIC | Age: 53
End: 2018-06-04

## 2018-06-04 ENCOUNTER — OFFICE VISIT (OUTPATIENT)
Dept: PAIN MEDICINE | Facility: CLINIC | Age: 53
End: 2018-06-04

## 2018-06-04 VITALS
OXYGEN SATURATION: 94 % | TEMPERATURE: 98.5 F | HEART RATE: 92 BPM | RESPIRATION RATE: 16 BRPM | SYSTOLIC BLOOD PRESSURE: 103 MMHG | HEIGHT: 64 IN | DIASTOLIC BLOOD PRESSURE: 74 MMHG

## 2018-06-04 DIAGNOSIS — M53.3 SACROILIAC JOINT DYSFUNCTION: ICD-10-CM

## 2018-06-04 DIAGNOSIS — G89.3 CANCER ASSOCIATED PAIN: ICD-10-CM

## 2018-06-04 DIAGNOSIS — M54.42 CHRONIC BILATERAL LOW BACK PAIN WITH BILATERAL SCIATICA: ICD-10-CM

## 2018-06-04 DIAGNOSIS — G89.4 CHRONIC PAIN SYNDROME: Primary | ICD-10-CM

## 2018-06-04 DIAGNOSIS — G89.4 CHRONIC PAIN SYNDROME: ICD-10-CM

## 2018-06-04 DIAGNOSIS — G89.29 CHRONIC BILATERAL LOW BACK PAIN WITH BILATERAL SCIATICA: ICD-10-CM

## 2018-06-04 DIAGNOSIS — Z97.8 PRESENCE OF INTRATHECAL PUMP: ICD-10-CM

## 2018-06-04 DIAGNOSIS — M54.41 CHRONIC BILATERAL LOW BACK PAIN WITH BILATERAL SCIATICA: ICD-10-CM

## 2018-06-04 LAB
POC AMPHETAMINES: NEGATIVE
POC BARBITURATES: NEGATIVE
POC BENZODIAZEPHINES: POSITIVE
POC COCAINE: NEGATIVE
POC METHADONE: NEGATIVE
POC METHAMPHETAMINE SCREEN URINE: NEGATIVE
POC OPIATES: NEGATIVE
POC OXYCODONE: POSITIVE
POC PHENCYCLIDINE: NEGATIVE
POC PROPOXYPHENE: NEGATIVE
POC THC: NEGATIVE
POC TRICYCLIC ANTIDEPRESSANTS: NEGATIVE

## 2018-06-04 PROCEDURE — 80305 DRUG TEST PRSMV DIR OPT OBS: CPT | Performed by: NURSE PRACTITIONER

## 2018-06-04 PROCEDURE — 62369 ANAL SP INF PMP W/REPRG&FILL: CPT | Performed by: NURSE PRACTITIONER

## 2018-06-04 PROCEDURE — 99214 OFFICE O/P EST MOD 30 MIN: CPT | Performed by: NURSE PRACTITIONER

## 2018-06-04 NOTE — PROGRESS NOTES
CHIEF COMPLAINT  Since last seen here on 4/30/18,Pt states her LBP and bilateral leg/feet numbness and pain has not improved.  Pt also states her low back pain just left of midline is now actually increasing.     Subjective   Isha Llanes is a 52 y.o. female  who presents to the office for follow-up.She has a history of chronic back pain. Reports her pain is worse since last office visit. Has not completed bilateral SI joint injections. Has not completed MRI ordered by Dr. Rios 4-2018.     Complains of pain in her low back, right worse than left, and legs. Today her pain is 8/10VAS. Describes the pain as continuous and worsening.  Believes she is having side effects from Naropin. Requested a decrease in Naropin in pump. It will be changed to Naropin 2.5 mg/ML today for pump refill. She wants to wean off the naropin. Continues with Hydromorphone via IT pump. Uses PTM. Denies any side effects with PTM. ADL's by self. NO longer taking antibiotics for leg abscesses/cellulitis.     Has a history of Hodgkin's Lymphoma.     Back Pain   This is a chronic problem. The current episode started more than 1 year ago. The problem occurs constantly. Progression since onset: worse since last office visit. The pain is present in the lumbar spine. The quality of the pain is described as aching, burning and shooting. The pain radiates to the left foot and right foot. The pain is at a severity of 8/10. The pain is the same all the time. Exacerbated by: all activity. Associated symptoms include chest pain, numbness (legs and feet bilat.) and weakness. Pertinent negatives include no bladder incontinence, bowel incontinence, dysuria, fever or headaches. Risk factors include history of cancer, lack of exercise and sedentary lifestyle. She has tried analgesics (IT pump, SI joint injection) for the symptoms. The treatment provided mild relief.      PEG Assessment   What number best describes your pain on average in the past  "week?7  What number best describes how, during the past week, pain has interfered with your enjoyment of life?7  What number best describes how, during the past week, pain has interfered with your general activity?  7     The following portions of the patient's history were reviewed and updated as appropriate: allergies, current medications, past family history, past medical history, past social history, past surgical history and problem list.    Review of Systems   Constitutional: Negative for activity change (in wheelchair \"most of the time\"), chills and fever.   Respiratory: Positive for shortness of breath.    Cardiovascular: Positive for chest pain.   Gastrointestinal: Negative for bowel incontinence, constipation (\"fluctuates between diarrhea and constipation\"), diarrhea, nausea and vomiting.   Genitourinary: Negative for bladder incontinence, difficulty urinating, dyspareunia and dysuria.   Musculoskeletal: Positive for back pain and neck pain.   Skin: Wound: \"vasculitis\" causing skin dryness(?)   Neurological: Positive for weakness and numbness (legs and feet bilat.). Negative for dizziness, light-headedness and headaches.   Psychiatric/Behavioral: Positive for sleep disturbance. Negative for confusion, hallucinations, self-injury and suicidal ideas. The patient is not nervous/anxious.        Vitals:    06/04/18 1020   BP: 103/74   Pulse: 92   Resp: 16   Temp: 98.5 °F (36.9 °C)   SpO2: 94%   Height: 162.6 cm (64.02\")   PainSc: 8  Comment: LBP,L > R side,ranges from 7-9/10   PainLoc: Back     Objective   Physical Exam   Constitutional: She is oriented to person, place, and time. She appears well-developed and well-nourished.   HENT:   Head: Normocephalic and atraumatic.   Eyes: Conjunctivae are normal.   Cardiovascular: Normal rate.    Pulmonary/Chest: Effort normal.   Abdominal:   Pump in LLQ   Musculoskeletal:        Lumbar back: She exhibits tenderness, bony tenderness and pain.   Surgical scar of lumbar " spine    MODERATE tenderness of bilateral SI joints, right worse than left    Unable to perform BASILIO due to patient habitus and pain     Neurological: She is alert and oriented to person, place, and time. No cranial nerve deficit. Gait (uses motorized wheelchair) abnormal.   Skin: Skin is warm and dry.   Psychiatric: Thought content normal. Her affect is blunt. Her speech is slurred (slowed). She is slowed.       Intrathecal pump was interrogated in office today. Results are in chart.      The patient is currently at a dose of 5.0/2.5 mg as above of hydromorphone/ropivacaine per day. There is the addition of PTM bolus.       Under sterile technique, the pump was accessed using a [employer] proprietary refill kit. The volume was withdrawn from the pump. The expected residual volume of the pump was 9.4ml. The actual residual volume was 8 ml.      The pump was then refilled with 20 ml of the concentration of  Hydromorphone/ropivicaine.20 mg/ 2.5mg/ml.  The Hydromorphone was increased to 5.25 mg/day(basal)/0.653Naropin with addition of PTM. The PTM doses were not changed from Dr. Rios's previous settings.       Patient tolerated procedure well. Minimal bleeding was noted. Pump site remains intact with no evidence of erythema or drainage.      Patient will return for pump refill when due or sooner if needed.    JUAN is 13 months    -------    Prelim UDS Immunoassay:    THC  (marijuana)  negative  VINICIUS (cocaine) negative  OPI (opiate) negative  AMP (amphet) negative  MET (methamph) negative  PCP (pcp)  negative  MDMA (ecstacy) negative  BAR (barbituate) negative  BZO (benzodiaz) positive  MTD (methadone) negative  TCA (tricyclic) negative  OXY (oxycodone) positive    [employer] WNL  Appearance WNL    -------  Assessment/Plan   Isha was seen today for back pain and leg pain.    Diagnoses and all orders for this visit:    Chronic pain syndrome  -     POC Urine Drug Screen, Triage  -     Urine Drug Screen Confirmation  - Urine, Urine, Clean Catch; Future    Sacroiliac joint dysfunction  -     POC Urine Drug Screen, Triage  -     Urine Drug Screen Confirmation - Urine, Urine, Clean Catch; Future  -     Case Request    Presence of intrathecal pump  -     POC Urine Drug Screen, Triage  -     Urine Drug Screen Confirmation - Urine, Urine, Clean Catch; Future    Chronic bilateral low back pain with bilateral sciatica  -     POC Urine Drug Screen, Triage  -     Urine Drug Screen Confirmation - Urine, Urine, Clean Catch; Future    Cancer associated pain  -     POC Urine Drug Screen, Triage  -     Urine Drug Screen Confirmation - Urine, Urine, Clean Catch; Future      --- Routine UDS in office today as part of monitoring requirements for controlled substances.  The specimen was viewed and the immunoassay result reviewed and is +OXY, +BZD.  This specimen will be sent to [facility] laboratory for confirmation.     --- Refill Pump. Change to Dilaudid 5.25 mg/day(0.653Naropin) with PTM.  --- Change concentration of Naropin to 2.5 mg/ml at this office visit. Patient expresses a continued wish to wean this. Decreased for next pump refill.  --- Per Dr. Rios, do not repeat pump dye study at this time.  --- Proceed with bilateral SI joint injection as previously ordered.  --- Lumbar MRI as previously ordered.   --- Follow-up for pump refill       TERRELL REPORT    As part of the patient's treatment plan, I am prescribing controlled substances. The patient has been made aware of appropriate use of such medications, including potential risk of somnolence, limited ability to drive and/or work safely, and the potential for dependence or overdose. It has also bee made clear that these medications are for use by this patient only, without concomitant use of alcohol or other substances unless prescribed.     Patient has completed prescribing agreement detailing terms of continued prescribing of controlled substances, including monitoring TERRELL  reports, urine drug screening, and pill counts if necessary. The patient is aware that inappropriate use will results in cessation of prescribing such medications.    TERRELL report has been reviewed and scanned into the patient's chart.    As the clinician, I personally reviewed the TERRELL from 6-1-18 while the patient was in the office today.    History and physical exam exhibit continued safe and appropriate use of controlled substances.      EMR Dragon/Transcription disclaimer:   Much of this encounter note is an electronic transcription/translation of spoken language to printed text. The electronic translation of spoken language may permit erroneous, or at times, nonsensical words or phrases to be inadvertently transcribed; Although I have reviewed the note for such errors, some may still exist.

## 2018-06-07 ENCOUNTER — DOCUMENTATION (OUTPATIENT)
Dept: PAIN MEDICINE | Facility: CLINIC | Age: 53
End: 2018-06-07

## 2018-06-14 ENCOUNTER — DOCUMENTATION (OUTPATIENT)
Dept: PAIN MEDICINE | Facility: CLINIC | Age: 53
End: 2018-06-14

## 2018-06-14 NOTE — PROGRESS NOTES
She did not call nor show up for diagnostic procedure today.  She has had some unfortunate issues in the past and has difficulties with transportation that are known.  The diagnostic injection seemed quite necessary as she has severe pain not be covered by ITP therapy.  She has has multiple issues with no shows for visits... Please see the practice manager's records.  However, with her severe complaints of pain, as well as observed sedation noted, it is difficult to safely continue to administer controlled substances.    We will likely need to wean off ITP medication and will likely need to discharge.  We will not plan to schedule for any more procedures.

## 2018-06-18 ENCOUNTER — TELEPHONE CONVERTED (OUTPATIENT)
Dept: ONCOLOGY | Facility: HOSPITAL | Age: 53
End: 2018-06-18

## 2018-06-26 ENCOUNTER — TELEPHONE CONVERTED (OUTPATIENT)
Dept: ONCOLOGY | Facility: HOSPITAL | Age: 53
End: 2018-06-26

## 2018-07-09 ENCOUNTER — OFFICE VISIT (OUTPATIENT)
Dept: PAIN MEDICINE | Facility: CLINIC | Age: 53
End: 2018-07-09

## 2018-07-09 VITALS
SYSTOLIC BLOOD PRESSURE: 112 MMHG | DIASTOLIC BLOOD PRESSURE: 62 MMHG | TEMPERATURE: 98 F | HEART RATE: 106 BPM | HEIGHT: 64 IN | OXYGEN SATURATION: 93 % | RESPIRATION RATE: 16 BRPM

## 2018-07-09 DIAGNOSIS — M46.1 SACROILIAC INFLAMMATION (HCC): ICD-10-CM

## 2018-07-09 DIAGNOSIS — G89.4 CHRONIC PAIN SYNDROME: Primary | ICD-10-CM

## 2018-07-09 DIAGNOSIS — Z97.8 PRESENCE OF INTRATHECAL PUMP: ICD-10-CM

## 2018-07-09 PROCEDURE — 62369 ANAL SP INF PMP W/REPRG&FILL: CPT | Performed by: NURSE PRACTITIONER

## 2018-07-09 PROCEDURE — 99212 OFFICE O/P EST SF 10 MIN: CPT | Performed by: NURSE PRACTITIONER

## 2018-07-09 RX ORDER — ALBUTEROL SULFATE 90 UG/1
2 AEROSOL, METERED RESPIRATORY (INHALATION) EVERY 4 HOURS PRN
COMMUNITY
End: 2022-01-13 | Stop reason: SDUPTHER

## 2018-07-09 NOTE — PROGRESS NOTES
"CHIEF COMPLAINT  Pt continues with basically unchanged LBP,with severe back pain episodes daily \"on the average\". Pt is here today for pump refill/reprogrammintg.  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    Subjective   Isha Llanes is a 52 y.o. female  who presents to the office for follow-up.She has a history of chronic low back pain. She has NO SHOWED multiple SI joint injection procedures.    Complains of pain in her low back, legs and neck. Today her pain is 8/10VAS. Describes the pain as continuous and worsening.  States her leg pain is worsening due to swelling. Seeing cardiologist on 7-12-18. She states \"I have 60% blockage in my aorta.\" Continues with Hydromorphone /naropin IT. Denies any side effects from this.  Also uses PTM. Difficulty with ADL's.  Having home health come twice weekly.    Is not doing any PT at home.  \"I'm tired of hurting.\"    Back Pain   This is a chronic problem. The current episode started more than 1 year ago. The problem occurs constantly. Progression since onset: worse since last office visit. The pain is present in the lumbar spine. The quality of the pain is described as aching, burning and shooting. The pain radiates to the left foot and right foot. The pain is at a severity of 8/10 (ranges from 6-8/10VAS). The pain is the same all the time. Exacerbated by: all activity. Associated symptoms include chest pain (occasionally), numbness (legs and feet bilat.) and weakness (legs bilat.). Pertinent negatives include no bladder incontinence, bowel incontinence, dysuria, fever or headaches. Risk factors include history of cancer, lack of exercise and sedentary lifestyle. She has tried analgesics (IT pump, SI joint injection) for the symptoms. The treatment provided mild relief.      PEG Assessment   What number best describes your pain on average in the past week?7  What number " "best describes how, during the past week, pain has interfered with your enjoyment of life?8  What number best describes how, during the past week, pain has interfered with your general activity?  8    The following portions of the patient's history were reviewed and updated as appropriate: allergies, current medications, past family history, past medical history, past social history, past surgical history and problem list.    Review of Systems   Constitutional: Negative for activity change (in wheelchair \"most of the time\"), chills and fever.   Respiratory: Positive for shortness of breath.    Cardiovascular: Positive for chest pain (occasionally).   Gastrointestinal: Positive for constipation (\"fluctuates between diarrhea and constipation\"\"mild\") and diarrhea. Negative for bowel incontinence, nausea and vomiting.   Genitourinary: Negative for bladder incontinence, difficulty urinating, dyspareunia and dysuria.   Musculoskeletal: Positive for back pain and neck pain.   Skin: Wound: \"vasculitis\" causing skin dryness(?)   Neurological: Positive for weakness (legs bilat.) and numbness (legs and feet bilat.). Negative for dizziness, light-headedness and headaches.   Psychiatric/Behavioral: Positive for sleep disturbance. Negative for confusion, hallucinations, self-injury and suicidal ideas. The patient is not nervous/anxious.        Vitals:    07/09/18 1014   BP: 112/62   Pulse: 106   Resp: 16   Temp: 98 °F (36.7 °C)   SpO2: 93%   Height: 162.6 cm (64.02\")   PainSc: 8  Comment: LBP ranges from 6-8/10   PainLoc: Back     Objective   Physical Exam   Constitutional: She is oriented to person, place, and time. She appears well-developed and well-nourished.   HENT:   Head: Normocephalic and atraumatic.   Eyes: Conjunctivae are normal.   Cardiovascular: Normal rate.    Pulmonary/Chest: Effort normal.   Abdominal:   Pump in LLQ   Musculoskeletal:        Lumbar back: She exhibits tenderness, bony tenderness and pain. "   Surgical scar of lumbar spine   Neurological: She is alert and oriented to person, place, and time. Gait (uses motorized wheelchair) abnormal.   Skin: Skin is warm and dry.   Erythema of BLE's with wraps noted   Psychiatric: Thought content normal. Her affect is blunt. She is slowed.        Intrathecal pump was interrogated in office today. Results are in chart.      The patient is currently at a dose of 5.25/2.5 mg as above of hydromorphone/ropivacaine per day. There is the addition of PTM bolus.       Under sterile technique, the pump was accessed using a [employer] proprietary refill kit. The volume was withdrawn from the pump. The expected residual volume of the pump was 8.9ml. The actual residual volume was 8 ml.      The pump was then refilled with 20 ml of the concentration of  Hydromorphone/ropivicaine.20 mg/ 2.5mg/ml.  The Hydromorphone was continued at 5.25 mg/day(basal)/0.653Naropin with addition of PTM. The PTM doses were not changed from Dr. Rios's previous settings.       Patient tolerated procedure well. Minimal bleeding was noted. Pump site remains intact with no evidence of erythema or drainage.      Patient will return for pump refill when due or sooner if needed.     JUAN is 12 months      Assessment/Plan   Isha was seen today for back pain.    Diagnoses and all orders for this visit:    Chronic pain syndrome    Sacroiliac inflammation (CMS/HCC)    Presence of intrathecal pump      --- Discussed with Dr. Rios. PA=rhianna has been non-compliant with plans of care, including no MRI and NO SHOW for procedures.   However, she was unable to have injections due to questionable infection of her BLE's. Will continue with regimen. Follow-up with Dr. Rios for next office evaluation/pump refill to determine long-term plan of care.  --- Refill Pump. No changes at this time.  --- Hold SI joint injection at this time due to questionable infection of BLE's  --- Follow-up pump refill with   [facility].       TERRELL REPORT    As part of the patient's treatment plan, I am prescribing controlled substances. The patient has been made aware of appropriate use of such medications, including potential risk of somnolence, limited ability to drive and/or work safely, and the potential for dependence or overdose. It has also bee made clear that these medications are for use by this patient only, without concomitant use of alcohol or other substances unless prescribed.     Patient has completed prescribing agreement detailing terms of continued prescribing of controlled substances, including monitoring TERRELL reports, urine drug screening, and pill counts if necessary. The patient is aware that inappropriate use will results in cessation of prescribing such medications.    TERRELL report has been reviewed and scanned into the patient's chart.    As the clinician, I personally reviewed the TERRELL from 7-6-18 while the patient was in the office today.    History and physical exam exhibit continued safe and appropriate use of controlled substances.      EMR Dragon/Transcription disclaimer:   Much of this encounter note is an electronic transcription/translation of spoken language to printed text. The electronic translation of spoken language may permit erroneous, or at times, nonsensical words or phrases to be inadvertently transcribed; Although I have reviewed the note for such errors, some may still exist.

## 2018-07-30 ENCOUNTER — TELEPHONE (OUTPATIENT)
Dept: PAIN MEDICINE | Facility: CLINIC | Age: 53
End: 2018-07-30

## 2018-08-08 ENCOUNTER — OFFICE VISIT CONVERTED (OUTPATIENT)
Dept: ONCOLOGY | Facility: HOSPITAL | Age: 53
End: 2018-08-08
Attending: NURSE PRACTITIONER

## 2018-08-10 ENCOUNTER — OFFICE VISIT (OUTPATIENT)
Dept: PAIN MEDICINE | Facility: CLINIC | Age: 53
End: 2018-08-10

## 2018-08-10 VITALS
TEMPERATURE: 98.4 F | HEART RATE: 72 BPM | DIASTOLIC BLOOD PRESSURE: 82 MMHG | RESPIRATION RATE: 16 BRPM | OXYGEN SATURATION: 96 % | SYSTOLIC BLOOD PRESSURE: 152 MMHG | HEIGHT: 64 IN

## 2018-08-10 DIAGNOSIS — M54.41 CHRONIC BILATERAL LOW BACK PAIN WITH BILATERAL SCIATICA: ICD-10-CM

## 2018-08-10 DIAGNOSIS — M54.42 CHRONIC BILATERAL LOW BACK PAIN WITH BILATERAL SCIATICA: ICD-10-CM

## 2018-08-10 DIAGNOSIS — G89.29 CHRONIC BILATERAL LOW BACK PAIN WITH BILATERAL SCIATICA: ICD-10-CM

## 2018-08-10 DIAGNOSIS — G89.4 CHRONIC PAIN SYNDROME: Primary | ICD-10-CM

## 2018-08-10 DIAGNOSIS — Z97.8 PRESENCE OF INTRATHECAL PUMP: ICD-10-CM

## 2018-08-10 PROCEDURE — 99214 OFFICE O/P EST MOD 30 MIN: CPT | Performed by: NURSE PRACTITIONER

## 2018-08-10 PROCEDURE — 62369 ANAL SP INF PMP W/REPRG&FILL: CPT | Performed by: NURSE PRACTITIONER

## 2018-08-10 NOTE — PROGRESS NOTES
CHIEF COMPLAINT  Pt here for intrathecal pump reprogramming- her LBP is reportedly unchanged from 7/9/18     Subjective   Isha Llanes is a 52 y.o. female  who presents to the office for follow-up.She has a history of chronic back pain.    Multiple issues with non-compliance.  Has no showed multiple injections and has not completed ordered MRI.      C/o back pain.  Pain today 7/10 in severity.  Continues with hydromorphone 5.25 mg/day.  Reports that overall her pain is not very well controlled.  She does admit that she could tell a slight improvement with most recent pump increase in June.  She denies adverse reactions.      Back Pain   This is a chronic problem. The current episode started more than 1 year ago. The problem occurs daily. The problem is unchanged. The pain is present in the lumbar spine. The quality of the pain is described as aching, burning and shooting. The pain radiates to the left foot and right foot. The pain is at a severity of 7/10 (ranges from 6-8/10VAS). The pain is the same all the time. Exacerbated by: all activity. Associated symptoms include chest pain (occasionally), numbness (legs and feet bilat.) and weakness (legs bilat.). Pertinent negatives include no bladder incontinence, bowel incontinence, dysuria, fever or headaches. Risk factors include history of cancer, lack of exercise and sedentary lifestyle. She has tried analgesics (IT pump, SI joint injection) for the symptoms. The treatment provided mild relief.        PEG Assessment   What number best describes your pain on average in the past week?8  What number best describes how, during the past week, pain has interfered with your enjoyment of life?8  What number best describes how, during the past week, pain has interfered with your general activity?  8      The following portions of the patient's history were reviewed and updated as appropriate: allergies, current medications, past family history, past medical history, past  "social history, past surgical history and problem list.    Review of Systems   Constitutional: Negative for activity change (in wheelchair \"most of the time\"), chills and fever.   Respiratory: Positive for shortness of breath.    Cardiovascular: Positive for chest pain (occasionally).   Gastrointestinal: Positive for constipation (\"fluctuates between diarrhea and constipation\"\"mild\") and diarrhea. Negative for bowel incontinence, nausea and vomiting.   Genitourinary: Negative for bladder incontinence, difficulty urinating, dyspareunia and dysuria.   Musculoskeletal: Positive for back pain and neck pain.   Skin: Wound: \"vasculitis\" causing skin dryness(?)   Neurological: Positive for weakness (legs bilat.) and numbness (legs and feet bilat.). Negative for dizziness, light-headedness and headaches.   Psychiatric/Behavioral: Positive for sleep disturbance. Negative for confusion, hallucinations, self-injury and suicidal ideas. The patient is not nervous/anxious.        Vitals:    08/10/18 1058   BP: 152/82   Pulse: 72   Resp: 16   Temp: 98.4 °F (36.9 °C)   SpO2: 96%   Height: 162.6 cm (64.02\")   PainSc: 7  Comment: LBP ranges from 7-8/10   PainLoc: Back       Objective   Physical Exam   Constitutional: She is oriented to person, place, and time. She appears well-developed and well-nourished.   HENT:   Head: Atraumatic.   Eyes: Conjunctivae and EOM are normal.   Neck: Neck supple.   Cardiovascular: Normal rate.    Pulmonary/Chest: Effort normal. No respiratory distress.   Musculoskeletal:        Lumbar back: She exhibits tenderness, deformity and pain.   Neurological: She is alert and oriented to person, place, and time. She has normal strength. Gait (motorized wheelchair) abnormal.   Skin: Skin is warm and dry.   Psychiatric: She has a normal mood and affect. Her behavior is normal.   Nursing note and vitals reviewed.    Intrathecal pump was interrogated in office today. Results are in chart.    The patient is " currently at a dose of 5.25 mg of hydromorphone per day. There is the addition of PTM bolus. This is set at 0.5 mg/actuation given 10 mintues. The lock-out is set at 2 hours with a maximum activation of 10 in 24 hours.    Under sterile technique, the pump was accessed using a [employer] proprietary refill kit. The volume was withdrawn from the pump. The expected residual volume of the pump was 10.1 ml. The actual residual volume of the pump was 9.5 ml.    The pump was then refilled with 20 ml of hydromorphone/naropin at a concentration of 20/2.5 mg/ml.   Patient tolerated procedure well. Minimal bleeding was noted. Pump site remains intact with no evidence of erythema or drainage.    Patient will return for pump refill when due or sooner if needed.     JUAN is 11 months.      Assessment/Plan   Isha was seen today for back pain.    Diagnoses and all orders for this visit:    Chronic pain syndrome    Presence of intrathecal pump    Chronic bilateral low back pain with bilateral sciatica      --- Increase hydromorphone to 5.5 mg/day (Naropin 0.69)  --- PTM device replaced today, hope this will resolve technical difficulties with PTM administration   --- Follow-up pump refill          TERRELL REPORT  As part of the patient's treatment plan, I am prescribing controlled substances. The patient has been made aware of appropriate use of such medications, including potential risk of somnolence, limited ability to drive and/or work safely, and the potential for dependence or overdose. It has also bee made clear that these medications are for use by this patient only, without concomitant use of alcohol or other substances unless prescribed.     Patient has completed prescribing agreement detailing terms of continued prescribing of controlled substances, including monitoring TERRELL reports, urine drug screening, and pill counts if necessary. The patient is aware that inappropriate use will results in cessation of  prescribing such medications.    TERRELL report has been reviewed and scanned into the patient's chart.    As the clinician, I personally reviewed the TERRELL from 8/9/2018 while the patient was in the office today.    History and physical exam exhibit continued safe and appropriate use of controlled substances.    EMR Dragon/Transcription disclaimer:   Much of this encounter note is an electronic transcription/translation of spoken language to printed text. The electronic translation of spoken language may permit erroneous, or at times, nonsensical words or phrases to be inadvertently transcribed; Although I have reviewed the note for such errors, some may still exist.

## 2018-09-04 ENCOUNTER — TELEPHONE (OUTPATIENT)
Dept: PAIN MEDICINE | Facility: CLINIC | Age: 53
End: 2018-09-04

## 2018-09-04 NOTE — TELEPHONE ENCOUNTER
I left a voicemail with the patient asking her to come on 9/7/18 after 10:30 AM for a pump refill. This is in response to her wanting to change her appointment from 9/12/18 due to planning a cardiac cath on 9/10/18.  Thanks I will call her and change the date (TBD).

## 2018-09-12 ENCOUNTER — TELEPHONE (OUTPATIENT)
Dept: PAIN MEDICINE | Facility: CLINIC | Age: 53
End: 2018-09-12

## 2018-09-12 NOTE — TELEPHONE ENCOUNTER
"Pt called to say she has been told to not travel any distances for \"a couple of days\" after the 9/10/18 cardiac cath. I reminded Pt that I had talked with her pre cardiac cath date about how important is was to have her dilaudid pump refilled (alarm date 9/14/18) BEFORE the 9/10/18 catheterization but Pt failed to make arrangements to do so.Pt then stated she will \"try to get my daughter to take me to the pain clinic tomorrow if she has enough gas in her car\".  "

## 2018-09-14 ENCOUNTER — OFFICE VISIT (OUTPATIENT)
Dept: PAIN MEDICINE | Facility: CLINIC | Age: 53
End: 2018-09-14

## 2018-09-14 VITALS
OXYGEN SATURATION: 95 % | SYSTOLIC BLOOD PRESSURE: 119 MMHG | HEART RATE: 97 BPM | DIASTOLIC BLOOD PRESSURE: 81 MMHG | TEMPERATURE: 98.2 F | HEIGHT: 64 IN | RESPIRATION RATE: 16 BRPM

## 2018-09-14 DIAGNOSIS — G89.29 CHRONIC BILATERAL LOW BACK PAIN WITH BILATERAL SCIATICA: ICD-10-CM

## 2018-09-14 DIAGNOSIS — M54.42 CHRONIC BILATERAL LOW BACK PAIN WITH BILATERAL SCIATICA: ICD-10-CM

## 2018-09-14 DIAGNOSIS — G89.4 CHRONIC PAIN SYNDROME: Primary | ICD-10-CM

## 2018-09-14 DIAGNOSIS — Z97.8 PRESENCE OF INTRATHECAL PUMP: ICD-10-CM

## 2018-09-14 DIAGNOSIS — M54.41 CHRONIC BILATERAL LOW BACK PAIN WITH BILATERAL SCIATICA: ICD-10-CM

## 2018-09-14 PROCEDURE — 62369 ANAL SP INF PMP W/REPRG&FILL: CPT | Performed by: NURSE PRACTITIONER

## 2018-09-14 NOTE — PROGRESS NOTES
"CHIEF COMPLAINT  Pt is here for [employer] Pump refill  Pt states her L upper leg pain has increased and LBP is unchanged; Pt had a cardiac cath. done on 9/10/18 and apparently will have a valve replacement ,to be scheduled.    Subjective   Isha Llanes is a 52 y.o. female  who presents to the office for follow-up.She has a history of chronic back pain.    Multiple issues with non-compliance.  Has no showed multiple injections and has not completed ordered MRI. Has cancelled her appointments with Dr. Rios to discuss plan.       C/o back pain.  Pain today 8/10 in severity.  Continues with hydromorphone 5.5 mg/day (increased last visit). Admits that she noticed some difference at first but it seems to have \"fizzled\".  Of note she only used her  times over 34 days, she is allowed 10 activations per day.      Back Pain   This is a chronic problem. The current episode started more than 1 year ago. The problem occurs daily. The problem has been waxing and waning since onset. The pain is present in the lumbar spine. The quality of the pain is described as aching, burning and shooting. The pain radiates to the left foot and right foot. The pain is at a severity of 8/10. The pain is the same all the time. Exacerbated by: all activity. Associated symptoms include numbness (legs and feet bilat.) and weakness (legs bilat.). Pertinent negatives include no bladder incontinence, bowel incontinence, chest pain (occasionally), dysuria, fever or headaches. Risk factors include history of cancer, lack of exercise and sedentary lifestyle. She has tried analgesics (IT pump, SI joint injection) for the symptoms. The treatment provided mild relief.        PEG Assessment   What number best describes your pain on average in the past week?7  What number best describes how, during the past week, pain has interfered with your enjoyment of life?7  What number best describes how, during the past week, pain has interfered with your " "general activity?  7      The following portions of the patient's history were reviewed and updated as appropriate: allergies, current medications, past family history, past medical history, past social history, past surgical history and problem list.    Review of Systems   Constitutional: Negative for activity change (in wheelchair \"most of the time\"), chills and fever.   Respiratory: Negative for apnea and shortness of breath.    Cardiovascular: Negative for chest pain (occasionally).   Gastrointestinal: Negative for bowel incontinence, constipation (\"fluctuates between diarrhea and constipation\"\"mild\"), diarrhea, nausea and vomiting.   Genitourinary: Negative for bladder incontinence, difficulty urinating, dyspareunia and dysuria.   Musculoskeletal: Positive for back pain and neck pain.   Skin: Wound: \"vasculitis\" causing skin dryness(?)   Neurological: Positive for weakness (legs bilat.) and numbness (legs and feet bilat.). Negative for dizziness, light-headedness and headaches.   Psychiatric/Behavioral: Positive for sleep disturbance (slept 1 hour last night). Negative for confusion, hallucinations, self-injury and suicidal ideas. Behavioral problem: dozing off. The patient is not nervous/anxious.        Vitals:    09/14/18 0924   BP: 119/81   Pulse: 97   Resp: 16   Temp: 98.2 °F (36.8 °C)   SpO2: 95%   Height: 162.6 cm (64.02\")   PainSc: 8  Comment: LBP bilaterally ranges from 6-8/10   PainLoc: Back     Objective   Physical Exam   Constitutional: She is oriented to person, place, and time. She appears well-developed and well-nourished.   HENT:   Head: Atraumatic.   Eyes: Conjunctivae and EOM are normal.   Neck: Neck supple.   Cardiovascular: Normal rate.    Pulmonary/Chest: Effort normal. No respiratory distress.   Musculoskeletal:        Lumbar back: She exhibits tenderness, deformity and pain.   Neurological: She is alert and oriented to person, place, and time. She has normal strength. Gait (motorized " wheelchair) abnormal.   Skin: Skin is warm and dry.   Psychiatric: She has a normal mood and affect. Her behavior is normal.   Nursing note and vitals reviewed.    Intrathecal pump was interrogated in office today. Results are in chart.    The patient is currently at a dose of 5.496 mg of Dilaudid per day. There is the addition of PTM bolus. This is set at 0.5 mg/actuation given over 10 minutes. The lock-out is set at 2 hours with a maximum activation of 10 in 24 hours.    Under sterile technique, the pump was accessed using a [employer] proprietary refill kit. The volume was withdrawn from the pump. The expected residual volume of the pump was 7.9 ml. The actual residual volume of the pump was 7.2 ml.    The pump was then refilled with 20 ml of Dilaudid at a concentration of 20 mg/ml (Naropin 2.5mg/ml).     Patient tolerated procedure well. Minimal bleeding was noted. Pump site remains intact with no evidence of erythema or drainage.    Patient will return for pump refill when due or sooner if needed.     JUAN is 10 months.      Assessment/Plan   Isha was seen today for back pain and leg pain.    Diagnoses and all orders for this visit:    Chronic pain syndrome    Presence of intrathecal pump    Chronic bilateral low back pain with bilateral sciatica      --- No changes to IT pump regimen today  --- Encouraged to use PTM more frequently, she is averaging using it less than a third of the allowed activations   --- Follow-up for next pump refill          TERRELL REPORT  As part of the patient's treatment plan, I am prescribing controlled substances. The patient has been made aware of appropriate use of such medications, including potential risk of somnolence, limited ability to drive and/or work safely, and the potential for dependence or overdose. It has also bee made clear that these medications are for use by this patient only, without concomitant use of alcohol or other substances unless prescribed.      Patient has completed prescribing agreement detailing terms of continued prescribing of controlled substances, including monitoring TERRELL reports, urine drug screening, and pill counts if necessary. The patient is aware that inappropriate use will results in cessation of prescribing such medications.    TERRELL report has been reviewed and scanned into the patient's chart.    As the clinician, I personally reviewed the TERRELL from 9/13/2018 while the patient was in the office today.    History and physical exam exhibit continued safe and appropriate use of controlled substances.    EMR Dragon/Transcription disclaimer:   Much of this encounter note is an electronic transcription/translation of spoken language to printed text. The electronic translation of spoken language may permit erroneous, or at times, nonsensical words or phrases to be inadvertently transcribed; Although I have reviewed the note for such errors, some may still exist.

## 2018-10-17 ENCOUNTER — OFFICE VISIT (OUTPATIENT)
Dept: PAIN MEDICINE | Facility: CLINIC | Age: 53
End: 2018-10-17

## 2018-10-17 VITALS
DIASTOLIC BLOOD PRESSURE: 94 MMHG | TEMPERATURE: 97 F | OXYGEN SATURATION: 99 % | SYSTOLIC BLOOD PRESSURE: 141 MMHG | HEIGHT: 64 IN | RESPIRATION RATE: 16 BRPM | HEART RATE: 88 BPM

## 2018-10-17 DIAGNOSIS — G89.4 CHRONIC PAIN SYNDROME: ICD-10-CM

## 2018-10-17 DIAGNOSIS — Z97.8 PRESENCE OF INTRATHECAL PUMP: Primary | ICD-10-CM

## 2018-10-17 PROCEDURE — 62369 ANAL SP INF PMP W/REPRG&FILL: CPT | Performed by: ANESTHESIOLOGY

## 2018-10-17 RX ORDER — FUROSEMIDE 40 MG/1
40 TABLET ORAL DAILY
COMMUNITY
End: 2021-06-21

## 2018-10-17 RX ORDER — GABAPENTIN 300 MG/1
300 CAPSULE ORAL 2 TIMES DAILY
COMMUNITY
End: 2020-06-23 | Stop reason: SDUPTHER

## 2018-10-17 NOTE — PROGRESS NOTES
"CHIEF COMPLAINT  Pt is here for intrathecal pump refill.  Pt states her LBP bilaterally is \"a bit better,staying at a 7/10\"  Pt now taking lasix and gabapentin per Dr Mcfarland,PCP,reports having lost 17 lbs in 1 month.She has not had heart surgery scheduled yet.  Pt's bilateral leg pain has moderately decreased since last seen here on 8/18.      Subjective   Isha Llanes is a 52 y.o. female  who presents to the office for follow-up.She has a history of spine pain and cancer history.    Intrathecal pump was interrogated in office today. Results are in chart.    The patient is currently at a dose of 5.5mg of hydromorphone with 0.69mg naropin per day. There is the addition of PTM bolus. This is set at 0.5 mg/0.0625/actuation given over 10 minutes. The lock-out is set at 2 hours with a maximum activation of 10 in 24 hours.    Regarding PTM... 112 successful activations, 29 LO, 14 unsuccessful.    Under sterile technique, the pump was accessed using a viavoo proprietary refill kit. The volume was withdrawn from the pump. The expected residual volume of the pump was 8.3ml ml. The actual residual volume of the pump was 8.0 ml.    The pump was then refilled with 20 ml of hydromorphone/ropivacaine at a concentration of 20mg/2.5mg/ml. The pump was set to continue at its previous base rate.  PTM change:  Increase the PTM dose to 0.8mg hydromorphone with QS Naropin.  6 activations a day.      Patient tolerated procedure well. Minimal bleeding was noted. Pump site remains intact with no evidence of erythema or drainage.    Patient will return for pump refill when due or sooner if needed.     JUAN is 9 months.      History of Present Illness     PEG Assessment   What number best describes your pain on average in the past week?7  What number best describes how, during the past week, pain has interfered with your enjoyment of life?7  What number best describes how, during the past week, pain has interfered with your general " "activity?  7        The following portions of the patient's history were reviewed and updated as appropriate: allergies, current medications, past family history, past medical history, past social history, past surgical history and problem list.    Review of Systems   Constitutional: Negative for activity change (in wheelchair \"most of the time\"), chills and fever.   Respiratory: Negative for apnea and shortness of breath.    Cardiovascular: Negative for chest pain (occasionally).   Gastrointestinal: Negative for constipation (\"fluctuates between diarrhea and constipation\"\"mild\"), diarrhea, nausea and vomiting.   Genitourinary: Negative for difficulty urinating, dyspareunia and dysuria.   Musculoskeletal: Positive for back pain and neck pain.   Skin: Wound: \"vasculitis\" causing skin dryness(?)   Neurological: Positive for weakness (legs bilat.) and numbness (legs and feet bilat. decreased). Negative for dizziness, light-headedness and headaches.   Psychiatric/Behavioral: Positive for sleep disturbance (slept 1 hour last night). Negative for confusion, hallucinations, self-injury and suicidal ideas. Behavioral problem: dozing off. The patient is not nervous/anxious.        Vitals:    10/17/18 1123   BP: 141/94   Pulse: 88   Resp: 16   Temp: 97 °F (36.1 °C)   SpO2: 99%   Height: 162.6 cm (64.02\")   PainSc: 7  Comment: LBP bilaterally is \"consistantly at a 7/10... a bit better\"   PainLoc: Back         Objective   Physical Exam        Assessment/Plan   Isha was seen today for back pain.    Diagnoses and all orders for this visit:    Presence of intrathecal pump    Chronic pain syndrome        --- Follow-up for next refill    -- she wishes to continue intrathecal pump use    -- she will use PTM at new settings for acute relief    -- she continues to request oral opioid, and I do not think that is reasonable now as we should try to optimize PTM use first                 TERRELL REPORT    As part of the patient's " treatment plan, I am prescribing controlled substances. The patient has been made aware of appropriate use of such medications, including potential risk of somnolence, limited ability to drive and/or work safely, and the potential for dependence or overdose. It has also bee made clear that these medications are for use by this patient only, without concomitant use of alcohol or other substances unless prescribed.     Patient has completed prescribing agreement detailing terms of continued prescribing of controlled substances, including monitoring TERRELL reports, urine drug screening, and pill counts if necessary. The patient is aware that inappropriate use will results in cessation of prescribing such medications.    TERRELL report has been reviewed and scanned into the patient's chart.    As the clinician, I personally reviewed the TERRELL from as above while the patient was in the office today.    History and physical exam exhibit continued safe and appropriate use of controlled substances.      EMR Dragon/Transcription disclaimer:   Much of this encounter note is an electronic transcription/translation of spoken language to printed text. The electronic translation of spoken language may permit erroneous, or at times, nonsensical words or phrases to be inadvertently transcribed; Although I have reviewed the note for such errors, some may still exist.

## 2018-10-26 ENCOUNTER — TELEPHONE (OUTPATIENT)
Dept: CARDIAC SURGERY | Facility: CLINIC | Age: 53
End: 2018-10-26

## 2018-10-26 NOTE — TELEPHONE ENCOUNTER
Dr Danielson has reviewed images and reports sent from Dr Malone and asked that I call patient and schedule her for an office visit. I left a message asking her to call our office to schedule an appointment

## 2018-11-13 ENCOUNTER — OFFICE VISIT (OUTPATIENT)
Dept: CARDIAC SURGERY | Facility: CLINIC | Age: 53
End: 2018-11-13

## 2018-11-13 VITALS
OXYGEN SATURATION: 96 % | HEIGHT: 67 IN | WEIGHT: 170 LBS | HEART RATE: 81 BPM | RESPIRATION RATE: 20 BRPM | DIASTOLIC BLOOD PRESSURE: 85 MMHG | TEMPERATURE: 98.7 F | BODY MASS INDEX: 26.68 KG/M2 | SYSTOLIC BLOOD PRESSURE: 124 MMHG

## 2018-11-13 DIAGNOSIS — R06.09 DYSPNEA ON EFFORT: ICD-10-CM

## 2018-11-13 DIAGNOSIS — Z74.09 IMMOBILITY: ICD-10-CM

## 2018-11-13 DIAGNOSIS — G89.3 CANCER ASSOCIATED PAIN: ICD-10-CM

## 2018-11-13 DIAGNOSIS — I35.0 AORTIC STENOSIS, SEVERE: Primary | ICD-10-CM

## 2018-11-13 DIAGNOSIS — M46.1 SACROILIITIS (HCC): ICD-10-CM

## 2018-11-13 DIAGNOSIS — Z97.8 PRESENCE OF INTRATHECAL PUMP: ICD-10-CM

## 2018-11-13 PROCEDURE — 99204 OFFICE O/P NEW MOD 45 MIN: CPT | Performed by: THORACIC SURGERY (CARDIOTHORACIC VASCULAR SURGERY)

## 2018-11-14 ENCOUNTER — OFFICE VISIT (OUTPATIENT)
Dept: PAIN MEDICINE | Facility: CLINIC | Age: 53
End: 2018-11-14

## 2018-11-14 ENCOUNTER — RESULTS ENCOUNTER (OUTPATIENT)
Dept: PAIN MEDICINE | Facility: CLINIC | Age: 53
End: 2018-11-14

## 2018-11-14 VITALS
HEIGHT: 67 IN | BODY MASS INDEX: 26.62 KG/M2 | SYSTOLIC BLOOD PRESSURE: 121 MMHG | TEMPERATURE: 98.4 F | OXYGEN SATURATION: 97 % | DIASTOLIC BLOOD PRESSURE: 84 MMHG | RESPIRATION RATE: 16 BRPM | HEART RATE: 94 BPM

## 2018-11-14 DIAGNOSIS — G89.4 CHRONIC PAIN SYNDROME: Primary | ICD-10-CM

## 2018-11-14 DIAGNOSIS — Z97.8 PRESENCE OF INTRATHECAL PUMP: ICD-10-CM

## 2018-11-14 DIAGNOSIS — G89.4 CHRONIC PAIN SYNDROME: ICD-10-CM

## 2018-11-14 DIAGNOSIS — M54.41 CHRONIC BILATERAL LOW BACK PAIN WITH BILATERAL SCIATICA: ICD-10-CM

## 2018-11-14 DIAGNOSIS — G89.29 CHRONIC BILATERAL LOW BACK PAIN WITH BILATERAL SCIATICA: ICD-10-CM

## 2018-11-14 DIAGNOSIS — M54.42 CHRONIC BILATERAL LOW BACK PAIN WITH BILATERAL SCIATICA: ICD-10-CM

## 2018-11-14 PROCEDURE — 99213 OFFICE O/P EST LOW 20 MIN: CPT | Performed by: ANESTHESIOLOGY

## 2018-11-14 PROCEDURE — 62369 ANAL SP INF PMP W/REPRG&FILL: CPT | Performed by: ANESTHESIOLOGY

## 2018-11-14 RX ORDER — HYDROCODONE BITARTRATE AND ACETAMINOPHEN 7.5; 325 MG/1; MG/1
1 TABLET ORAL 2 TIMES DAILY PRN
Qty: 60 TABLET | Refills: 0 | Status: SHIPPED | OUTPATIENT
Start: 2018-11-14 | End: 2018-12-19

## 2018-11-14 NOTE — PROGRESS NOTES
CHIEF COMPLAINT  Pt is here today for pump refill (hydromorphone,naropin). Pt states her LBP is basically unchanged but Pt does report occasional sharp /brief pain in center of low back.    Subjective   Isha Llanes is a 52 y.o. female  who presents to the office for follow-up.She has a history of spine pain and also cancer pain    She notes that she is having increased pelvic pain, and is concerned about cancer recurrence.  Causing significant nausea, losing appetite.     IT Pump is a 20ml Syncrhromed II pump, and is infusing Hydromorphone 20mg/ml and Naropin 2.5mg/ml, at a dose of Hydromorphone 5.5mg/qs Naropin/day.  PTM is 0.8mg HM/0.1mg Narop/dose, lockout 2h, max 7 dose/day.  Activation history of 65 successful activations with 8 LO and 8 unsuccessful.  Pump was refilled with with 20ml of the same concentration of medication, and the basal rate increased to hydromorphone 6mg/qs naropin/day.  No changes to PTM.  JUAN is 8 months.  Of note, ERV was 9.8ml and ARV was 6.6ml.      Back Pain   This is a chronic problem. The current episode started more than 1 year ago. The problem occurs daily. The problem has been gradually worsening since onset. The pain is present in the lumbar spine. The quality of the pain is described as aching, burning and shooting. The pain radiates to the left foot and right foot. The pain is at a severity of 8/10. The pain is the same all the time. The symptoms are aggravated by bending, sitting, standing, twisting and position (all activity). Associated symptoms include numbness (legs and feet bilaterally), pelvic pain and weakness (legs bilat.). Pertinent negatives include no bladder incontinence, bowel incontinence, chest pain (occasionally), dysuria, fever or headaches. Risk factors include history of cancer, lack of exercise and sedentary lifestyle. She has tried analgesics (IT pump, SI joint injection) for the symptoms. The treatment provided mild relief.   Pelvic Pain   The  "patient's primary symptoms include pelvic pain. The patient's pertinent negatives include no genital itching, genital rash, vaginal bleeding or vaginal discharge. This is a recurrent problem. The problem occurs 2 to 4 times per day. The problem has been gradually worsening. The pain is moderate. The problem affects both sides. She is not pregnant. Associated symptoms include back pain. Pertinent negatives include no chills, constipation (\"fluctuates between diarrhea and constipation\"\"mild\"), diarrhea, dysuria, fever, headaches, nausea or vomiting. Nothing aggravates the symptoms. She has tried nothing for the symptoms. She is not sexually active.        PEG Assessment   What number best describes your pain on average in the past week?7  What number best describes how, during the past week, pain has interfered with your enjoyment of life?9  What number best describes how, during the past week, pain has interfered with your general activity?  8    --  The aforementioned information the Chief Complaint section and above subjective data including any HPI data has been personally reviewed and affirmed.  --        The following portions of the patient's history were reviewed and updated as appropriate: allergies, current medications, past family history, past medical history, past social history, past surgical history and problem list.    Review of Systems   Constitutional: Negative for activity change (in wheelchair \"most of the time\"), chills and fever.   Respiratory: Positive for shortness of breath. Negative for apnea.    Cardiovascular: Positive for leg swelling (decreased: Pt to have heart surgery \"soon\",date not determined yet.). Negative for chest pain (occasionally).   Gastrointestinal: Negative for bowel incontinence, constipation (\"fluctuates between diarrhea and constipation\"\"mild\"), diarrhea, nausea and vomiting.   Genitourinary: Positive for pelvic pain. Negative for bladder incontinence, difficulty " "urinating, dyspareunia, dysuria and vaginal discharge.   Musculoskeletal: Positive for back pain and neck pain.   Skin: Wound: \"vasculitis\" causing skin dryness(?)   Neurological: Positive for weakness (legs bilat.) and numbness (legs and feet bilaterally). Negative for dizziness, light-headedness and headaches.   Psychiatric/Behavioral: Positive for sleep disturbance (slept 1 hour last night). Negative for confusion, hallucinations, self-injury and suicidal ideas. Behavioral problem: dozing off. The patient is not nervous/anxious.      -------    The following portions of the patient's history were reviewed and updated as appropriate: allergies, current medications, past family history, past medical history, past social history, past surgical history and problem list.    Allergies   Allergen Reactions   • Amoxicillin    • Ceclor [Cefaclor]    • Compazine [Prochlorperazine Edisylate]    • Contrast Dye    • Penicillins    • Phenergan [Promethazine Hcl]    • Sulfa Antibiotics          Current Outpatient Medications:   •  acetaminophen (TYLENOL) 325 MG tablet, Take 325 mg by mouth As Needed for Mild Pain (1-3)., Disp: , Rfl:   •  albuterol (PROVENTIL HFA;VENTOLIN HFA) 108 (90 Base) MCG/ACT inhaler, Inhale 2 puffs Every 4 (Four) Hours As Needed for Wheezing., Disp: , Rfl:   •  ALPRAZolam (XANAX) 1 MG tablet, Take .5 mg prn QD prn, Disp: , Rfl: 0  •  furosemide (LASIX) 40 MG tablet, Take 40 mg by mouth Daily., Disp: , Rfl:   •  gabapentin (NEURONTIN) 300 MG capsule, Take 300 mg by mouth 3 (Three) Times a Day., Disp: , Rfl:   •  SITagliptin (JANUVIA) 100 MG tablet, Take 100 mg by mouth Daily., Disp: , Rfl:   •  HYDROcodone-acetaminophen (NORCO) 7.5-325 MG per tablet, Take 1 tablet by mouth 2 (Two) Times a Day As Needed for Severe Pain ., Disp: 60 tablet, Rfl: 0    Current Outpatient Medications on File Prior to Visit   Medication Sig Dispense Refill   • acetaminophen (TYLENOL) 325 MG tablet Take 325 mg by mouth As Needed " for Mild Pain (1-3).     • albuterol (PROVENTIL HFA;VENTOLIN HFA) 108 (90 Base) MCG/ACT inhaler Inhale 2 puffs Every 4 (Four) Hours As Needed for Wheezing.     • ALPRAZolam (XANAX) 1 MG tablet Take .5 mg prn QD prn  0   • furosemide (LASIX) 40 MG tablet Take 40 mg by mouth Daily.     • gabapentin (NEURONTIN) 300 MG capsule Take 300 mg by mouth 3 (Three) Times a Day.     • SITagliptin (JANUVIA) 100 MG tablet Take 100 mg by mouth Daily.       No current facility-administered medications on file prior to visit.        Patient Active Problem List   Diagnosis   • Cancer associated pain   • Presence of intrathecal pump   • Chronic low back pain with bilateral sciatica   • Sacroiliitis (CMS/HCC)   • Sacroiliac joint dysfunction   • Sacroiliac inflammation (CMS/HCC)   • SI (sacroiliac) joint dysfunction   • Chronic pain syndrome   • Pelvic pain       Past Medical History:   Diagnosis Date   • Anxiety    • Arthritis    • Back pain    • Blood clotting disorder (CMS/HCC)    • Hodgkin's lymphoma (CMS/HCC)    • Low back pain    • Lupus    • Mid back pain    • Peripheral neuropathy        Past Surgical History:   Procedure Laterality Date   • BACK SURGERY     • BLADDER REPAIR     • CHOLECYSTECTOMY     • HYSTERECTOMY     • LYMPHADENECTOMY     • TONSILLECTOMY     • TUBAL ABDOMINAL LIGATION     • TUMOR REMOVAL         Family History   Problem Relation Age of Onset   • Pancreatic cancer Sister    • Pancreatic cancer Paternal Grandmother        Social History     Socioeconomic History   • Marital status: Single     Spouse name: Not on file   • Number of children: Not on file   • Years of education: Not on file   • Highest education level: Not on file   Social Needs   • Financial resource strain: Not on file   • Food insecurity - worry: Not on file   • Food insecurity - inability: Not on file   • Transportation needs - medical: Not on file   • Transportation needs - non-medical: Not on file   Occupational History   • Not on file  "  Tobacco Use   • Smoking status: Current Every Day Smoker   • Smokeless tobacco: Never Used   Substance and Sexual Activity   • Alcohol use: No   • Drug use: No   • Sexual activity: Not on file   Other Topics Concern   • Not on file   Social History Narrative   • Not on file       -------            Vitals:    11/14/18 1334   BP: 121/84   Pulse: 94   Resp: 16   Temp: 98.4 °F (36.9 °C)   SpO2: 97%   Height: 170.2 cm (67.01\")   PainSc:   7         Objective   Physical Exam   Constitutional: She is oriented to person, place, and time. She appears well-developed and well-nourished. She has a sickly appearance.   HENT:   Head: Normocephalic and atraumatic.   Eyes: Conjunctivae are normal. No scleral icterus.   Pulmonary/Chest: Effort normal. No respiratory distress.   Musculoskeletal:        Thoracic back: She exhibits deformity.        Lumbar back: She exhibits deformity.   Neurological: She is alert and oriented to person, place, and time. Gait abnormal.   Psychiatric: She has a normal mood and affect. Her behavior is normal.   Nursing note and vitals reviewed.          Assessment/Plan   Isha was seen today for back pain.    Diagnoses and all orders for this visit:    Chronic pain syndrome  -     Oral Fluid Drug Screen - Saliva, Oral Cavity; Future    Presence of intrathecal pump  -     Oral Fluid Drug Screen - Saliva, Oral Cavity; Future    Chronic bilateral low back pain with bilateral sciatica    Other orders  -     HYDROcodone-acetaminophen (NORCO) 7.5-325 MG per tablet; Take 1 tablet by mouth 2 (Two) Times a Day As Needed for Severe Pain .        --- Follow-up 1 month  -- increased pelvic pain.... Trial of Norco for extreme flareups only.    -- may need to consider Marinol in the future  -- refill IT pump... Increase basal rate to hydromorphone (from 5.5mg/day with qs Naropin) to 6mg/day with qs Naropin             Patient appears stable with current IT opioid regimen. No adverse effects. Regarding " continuation of opioids, there is no evidence of aberrant behavior or any red flags.  The patient continues with moderate response to opioid therapy. ADL's remain intact by self.     she does have a significant history of cancer and also significant spine pain and demonstrates moderate improvement with multimodal therapy including opioid therapy, which allows her to engage in ADLs and family activities. The continuation of opioid therapy is therefore not contraindicated. We will however respect the March 2016 CDC Guidelines and will plan to avoid overexposure to opioids and avoid dose escalation.      TERRELL REPORT    As part of the patient's treatment plan, I am prescribing controlled substances. The patient has been made aware of appropriate use of such medications, including potential risk of somnolence, limited ability to drive and/or work safely, and the potential for dependence or overdose. It has also bee made clear that these medications are for use by this patient only, without concomitant use of alcohol or other substances unless prescribed.     Patient has completed prescribing agreement detailing terms of continued prescribing of controlled substances, including monitoring TERRELL reports, urine drug screening, and pill counts if necessary. The patient is aware that inappropriate use will results in cessation of prescribing such medications.    TERRELL report has been reviewed and scanned into the patient's chart.    As the clinician, I personally reviewed the TERRELL from as above while the patient was in the office today.    History and physical exam exhibit continued safe and appropriate use of controlled substances.      EMR Dragon/Transcription disclaimer:   Much of this encounter note is an electronic transcription/translation of spoken language to printed text. The electronic translation of spoken language may permit erroneous, or at times, nonsensical words or phrases to be inadvertently  transcribed; Although I have reviewed the note for such errors, some may still exist.

## 2018-11-16 ENCOUNTER — PRIOR AUTHORIZATION (OUTPATIENT)
Dept: PAIN MEDICINE | Facility: CLINIC | Age: 53
End: 2018-11-16

## 2018-11-16 NOTE — TELEPHONE ENCOUNTER
Sent PA for Hydrocodone-apap to [facility] through [employer] (Key # [id number]) and waiting for response

## 2018-11-19 DIAGNOSIS — I35.0 NONRHEUMATIC AORTIC VALVE STENOSIS: Primary | ICD-10-CM

## 2018-11-19 DIAGNOSIS — I50.32 CHRONIC DIASTOLIC CONGESTIVE HEART FAILURE (HCC): ICD-10-CM

## 2018-11-19 DIAGNOSIS — R09.89 CAROTID BRUIT, UNSPECIFIED LATERALITY: ICD-10-CM

## 2018-11-19 NOTE — TELEPHONE ENCOUNTER
Received fax that [facility] does NOT cover PA's for passport - resent PA to [employer] through [employer] (Key # [id number]) and waiting on response

## 2018-11-20 PROBLEM — R10.2 PELVIC PAIN: Status: ACTIVE | Noted: 2018-11-20

## 2018-11-21 PROBLEM — I26.99 OTHER PULMONARY EMBOLISM WITHOUT ACUTE COR PULMONALE: Status: ACTIVE | Noted: 2018-11-21

## 2018-11-21 PROBLEM — R06.09 DYSPNEA ON EFFORT: Status: ACTIVE | Noted: 2018-11-21

## 2018-11-21 PROBLEM — Z74.09 IMMOBILITY: Status: ACTIVE | Noted: 2018-11-21

## 2018-11-21 PROBLEM — I35.0 AORTIC STENOSIS, SEVERE: Status: ACTIVE | Noted: 2018-11-21

## 2018-11-21 PROBLEM — C81.08 NODULAR LYMPHOCYTE PREDOMINANT HODGKIN LYMPHOMA OF LYMPH NODES OF MULTIPLE REGIONS: Status: ACTIVE | Noted: 2018-11-21

## 2018-11-21 NOTE — PROGRESS NOTES
11/21/2018    Seen on 11/13/18    Chief Complaint     Evaluation of aortic stenosis    History of Present Illness:       Dear Dr. Malone and Colleagues,  It was nice to see Isha Llanes in consultation at your request. She is a 52 y.o. female with a history of multiple severe medical problems including pulmonary embolus in the past with protein see and S deficiency, Hodgkin's lymphoma with radiation, spinal and sacroiliac problems resulting in immobility and wheelchair ridden who has developed progressive dyspnea on minimal effort.  She also has intermittent, no radiated, left-sided chest heaviness for the last year.  Her symptoms have gotten worse.  He ambulation is minimal with mainly transferring from wheelchair.  She has venous stasis changes in the lower extremities with edema and chronic ulcerations. She denies syncope, TIA, orthopnea or PND.  She had an echocardiogram that showed severe aortic stenosis with an aortic valve area of 0.65 cm², mean gradient of 44 mmHg, peak gradient of 77 mmHg and a peak velocity of 4.4 m/s.  It is unclear whether the valve is bicuspid.  There was mild mitral regurgitation, no atrial septal defect and a ventricular ejection fraction of 60%.  Following, she had a cardiac catheter that showed normal coronaries.  He is here for evaluation.    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)   • Other pulmonary embolism without acute cor pulmonale (CMS/HCC)       Past Medical History:   Diagnosis Date   • Anxiety    • Arthritis    • Back pain     • Blood clotting disorder (CMS/HCC)    • Hodgkin's lymphoma (CMS/HCC)    • Low back pain    • Lupus    • Mid back pain    • Peripheral neuropathy        Past Surgical History:   Procedure Laterality Date   • BACK SURGERY     • BLADDER REPAIR     • CHOLECYSTECTOMY     • HYSTERECTOMY     • LYMPHADENECTOMY     • TONSILLECTOMY     • TUBAL ABDOMINAL LIGATION     • TUMOR REMOVAL         Allergies   Allergen Reactions   • Amoxicillin    • Ceclor [Cefaclor]    • Compazine [Prochlorperazine Edisylate]    • Contrast Dye    • Penicillins    • Phenergan [Promethazine Hcl]    • Sulfa Antibiotics          Current Outpatient Medications:   •  acetaminophen (TYLENOL) 325 MG tablet, Take 325 mg by mouth As Needed for Mild Pain (1-3)., Disp: , Rfl:   •  albuterol (PROVENTIL HFA;VENTOLIN HFA) 108 (90 Base) MCG/ACT inhaler, Inhale 2 puffs Every 4 (Four) Hours As Needed for Wheezing., Disp: , Rfl:   •  ALPRAZolam (XANAX) 1 MG tablet, Take .5 mg prn QD prn, Disp: , Rfl: 0  •  furosemide (LASIX) 40 MG tablet, Take 40 mg by mouth Daily., Disp: , Rfl:   •  gabapentin (NEURONTIN) 300 MG capsule, Take 300 mg by mouth 3 (Three) Times a Day., Disp: , Rfl:   •  SITagliptin (JANUVIA) 100 MG tablet, Take 100 mg by mouth Daily., Disp: , Rfl:   •  HYDROcodone-acetaminophen (NORCO) 7.5-325 MG per tablet, Take 1 tablet by mouth 2 (Two) Times a Day As Needed for Severe Pain ., Disp: 60 tablet, Rfl: 0    Social History     Socioeconomic History   • Marital status: Single     Spouse name: Not on file   • Number of children: Not on file   • Years of education: Not on file   • Highest education level: Not on file   Social Needs   • Financial resource strain: Not on file   • Food insecurity - worry: Not on file   • Food insecurity - inability: Not on file   • Transportation needs - medical: Not on file   • Transportation needs - non-medical: Not on file   Occupational History   • Not on file   Tobacco Use   • Smoking status: Current Every Day Smoker   •  Smokeless tobacco: Never Used   Substance and Sexual Activity   • Alcohol use: No   • Drug use: No   • Sexual activity: Not on file   Other Topics Concern   • Not on file   Social History Narrative   • Not on file       Family History   Problem Relation Age of Onset   • Pancreatic cancer Sister    • Pancreatic cancer Paternal Grandmother      Review of Systems   Constitutional: Positive for fatigue. Negative for unexpected weight change.   Respiratory: Positive for chest tightness.    Cardiovascular: Positive for chest pain, palpitations and leg swelling.   Genitourinary: Positive for dysuria.   Neurological: Positive for weakness. Negative for dizziness and syncope.   All other systems reviewed and are negative.      Physical Exam:    Vital Signs:  Weight: 77.1 kg (170 lb)   Body mass index is 26.63 kg/m².  Temp: 98.7 °F (37.1 °C)   Heart Rate: 81   BP: 124/85     Physical Exam   Constitutional: She is oriented to person, place, and time. She appears well-developed and well-nourished.   HENT:   Head: Normocephalic and atraumatic.   Nose: Nose normal.   Mouth/Throat: Oropharynx is clear and moist.   Eyes: Conjunctivae are normal. Pupils are equal, round, and reactive to light.   Neck: Normal range of motion. Neck supple. No JVD present. No thyromegaly present.   Cardiovascular: Normal rate, regular rhythm, S1 normal, S2 normal and intact distal pulses. PMI is not displaced. Exam reveals no gallop and no friction rub.   Murmur heard.  Pulses:       Radial pulses are 2+ on the right side, and 2+ on the left side.        Dorsalis pedis pulses are 2+ on the right side, and 2+ on the left side.   3/6 systolic murmur in aortic area   Pulmonary/Chest: Effort normal and breath sounds normal. She has no rales.   Abdominal: Soft. Bowel sounds are normal. She exhibits no distension and no mass. There is no tenderness.   Musculoskeletal: Normal range of motion. She exhibits edema, tenderness and deformity.   Venous stasis  bilateral indurated edema, cellulitis and ulcers, superfitial   Lymphadenopathy:     She has no cervical adenopathy.   Neurological: She is alert and oriented to person, place, and time. She has normal reflexes.   Skin: Skin is warm and dry. No rash noted. No erythema.   Psychiatric: She has a normal mood and affect. Her behavior is normal.    no neck or axillary adenopathy     Assessment:     Problem List Items Addressed This Visit        Cardiovascular and Mediastinum    Aortic stenosis, severe - Primary       Respiratory    Dyspnea on effort       Nervous and Auditory    Cancer associated pain       Musculoskeletal and Integument    Sacroiliitis (CMS/HCC)    Immobility       Other    Presence of intrathecal pump          1. Severe symptomatic aortic stenosis  2. Horgkins lymphoma status post radiation chemotherapy with sequela  3. Hypocoagulable state with history of DVTs, pulmonary emboli and venous stasis problems  4. Immobility  5. Unable to lie flat unless she has heavy sedation or anesthesia  6.   Dyspnea of exertion and possible congestive heart failure class III    Recommendation/Plan:     She has severe aortic stenosis but her comorbidities and so severe that preclude safe open aVR procedure.  I discussed with her the option of a possible percutaneous aortic valve, however I did not guarantee she would be a candidate.  If not, then a valvuloplasty would be the only option left.  We will arrange her for her to see the TAVR team members and have a TAVR CTA to assess feasibility.  She wishes to proceed      Thank you for allowing me to participate in her care.    Regards,    Gasper Danielson M.D.

## 2018-12-04 ENCOUNTER — TELEPHONE (OUTPATIENT)
Dept: CARDIAC SURGERY | Facility: CLINIC | Age: 53
End: 2018-12-04

## 2018-12-05 ENCOUNTER — APPOINTMENT (OUTPATIENT)
Dept: CT IMAGING | Facility: HOSPITAL | Age: 53
End: 2018-12-05
Attending: THORACIC SURGERY (CARDIOTHORACIC VASCULAR SURGERY)

## 2018-12-05 ENCOUNTER — HOSPITAL ENCOUNTER (OUTPATIENT)
Dept: RESPIRATORY THERAPY | Facility: HOSPITAL | Age: 53
Discharge: HOME | End: 2018-12-05
Attending: THORACIC SURGERY (CARDIOTHORACIC VASCULAR SURGERY)

## 2018-12-05 DIAGNOSIS — I35.0 NONRHEUMATIC AORTIC VALVE STENOSIS: ICD-10-CM

## 2018-12-05 DIAGNOSIS — I50.32 CHRONIC DIASTOLIC CONGESTIVE HEART FAILURE (HCC): ICD-10-CM

## 2018-12-13 ENCOUNTER — OFFICE VISIT (OUTPATIENT)
Dept: PAIN MEDICINE | Facility: CLINIC | Age: 53
End: 2018-12-13

## 2018-12-13 VITALS
DIASTOLIC BLOOD PRESSURE: 77 MMHG | OXYGEN SATURATION: 94 % | TEMPERATURE: 97.9 F | HEART RATE: 90 BPM | RESPIRATION RATE: 16 BRPM | WEIGHT: 170 LBS | HEIGHT: 67 IN | BODY MASS INDEX: 26.68 KG/M2 | SYSTOLIC BLOOD PRESSURE: 112 MMHG

## 2018-12-13 DIAGNOSIS — G89.4 CHRONIC PAIN SYNDROME: Primary | ICD-10-CM

## 2018-12-13 DIAGNOSIS — M53.3 SI (SACROILIAC) JOINT DYSFUNCTION: ICD-10-CM

## 2018-12-13 DIAGNOSIS — Z97.8 PRESENCE OF INTRATHECAL PUMP: ICD-10-CM

## 2018-12-13 PROCEDURE — 62369 ANAL SP INF PMP W/REPRG&FILL: CPT | Performed by: NURSE PRACTITIONER

## 2018-12-13 NOTE — PROGRESS NOTES
"CHIEF COMPLAINT  Pt is here today for intrathecal pump refill; although her LBP is ranging from 6-8/10, Pt states her bilateral leg pain is significantly improved since about 2 months ago,since taking 900 mg of gabapentin daily.    Subjective   Isha Llanes is a 53 y.o. female  who presents to the office for follow-up.She has a history of chronic back pain. Also has a history of cancer.   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 \"Changing cells.\" THis started approximately 2 weeks ago.  Goes to oncologist tomorrow for evaluation. History of non-hogkin's lymphoma.     Complains of pain in her low back. Today her pain is 7/10VAS. Describes the pain as continuous aching and throbbing. Her leg pain is improved with gabapentin. Has started PT to help with walking. At last office visit, her pump was increased to 6.0 mg/day with use of PTM. She also states Dr. Rios gave her Norco last office visit for her acute pelvic pain. She asked for a refill for this. She states she had taken them all already. Remains generally wheelchair(electric) bound.    ERV-8.5  ARV-6.9  JUAN- 7 months    Having a heart valve replacement in January.   History of Present Illness     PEG Assessment   What number best describes your pain on average in the past week?7  What number best describes how, during the past week, pain has interfered with your enjoyment of life?7  What number best describes how, during the past week, pain has interfered with your general activity?  7      The following portions of the patient's history were reviewed and updated as appropriate: allergies, current medications, past family history, past medical history, past social history, past surgical history and problem list.    Review of Systems   Constitutional: Negative for activity change (in wheelchair \"most of the time\"), chills and fever.   Respiratory: Positive for shortness of breath. Negative for apnea.    Cardiovascular: Positive for leg swelling (decreased: Pt " "to have heart surgery \"soon\",date not determined yet.). Negative for chest pain (occasionally).   Gastrointestinal: Negative for constipation (\"fluctuates between diarrhea and constipation\"\"mild\"), diarrhea, nausea and vomiting.   Genitourinary: Positive for pelvic pain (to see oncologist). Negative for difficulty urinating, dyspareunia, dysuria and vaginal discharge.   Musculoskeletal: Positive for back pain and neck pain.   Skin: Wound: \"vasculitis\" causing skin dryness(?)   Neurological: Positive for weakness (legs bilat.) and numbness (legs and feet bilaterally). Negative for dizziness, light-headedness and headaches.   Psychiatric/Behavioral: Positive for sleep disturbance (slept 1 hour last night). Negative for confusion, hallucinations, self-injury and suicidal ideas. Behavioral problem: dozing off. The patient is not nervous/anxious.        Vitals:    12/13/18 1328   BP: 112/77   Pulse: 90   Resp: 16   Temp: 97.9 °F (36.6 °C)   SpO2: 94%   Weight: 77.1 kg (170 lb)   Height: 170.2 cm (67.01\")   PainSc:   7   PainLoc: Back  Comment: Center LBP ranges from 6-8/10     Objective   Physical Exam   Constitutional: She is oriented to person, place, and time. She appears well-developed and well-nourished.   HENT:   Head: Normocephalic and atraumatic.   Eyes: Conjunctivae are normal.   Cardiovascular: Normal rate.   Pulmonary/Chest: Effort normal.   Abdominal:   Pump in LLQ   Musculoskeletal:        Lumbar back: She exhibits tenderness, bony tenderness and pain.   Surgical scar of lumbar spine   Neurological: She is alert and oriented to person, place, and time. Gait (uses motorized wheelchair) abnormal.   Skin: Skin is warm and dry.   Psychiatric: She has a normal mood and affect. Her speech is normal and behavior is normal. Thought content normal.         Intrathecal pump was interrogated in office today. Results are in chart.    The patient is currently at a dose of hydromorphone(20 mg/ml)/naropin(2.5 mg/ml) of 6.0 " "mg per day. There is the addition of PTM bolus. This is set at 0.8/0.1 mg/actuation given over 10 minutes. The lock-out is set at 2 hours with a maximum activation of 7 in 24 hours.    Under sterile technique, the pump was accessed using a [employer] proprietary refill kit. The volume was withdrawn from the pump. The expected residual volume of the pump was 8.5 ml. The actual residual volume of the pump was 6.9 ml. +++ LONG TERM DISCREPANCY+++     The pump was then refilled with 20 ml of hydromorphone/naropin at a concentration of 20/2.5 mg/ml.  The pump was set to continue at its previous rate. No changes were made.     Patient tolerated procedure well. Minimal bleeding was noted. Pump site remains intact with no evidence of erythema or drainage.    Patient will return for pump refill when due or sooner if needed.     JUAN is 7 months.      Assessment/Plan   Isha was seen today for back pain.    Diagnoses and all orders for this visit:    Chronic pain syndrome    SI (sacroiliac) joint dysfunction    Presence of intrathecal pump      --- The urine drug screen confirmation from 11-15-18 has been reviewed and the result is APPROPRIATE(not currently prescribed Hydrocodone at that time, but has IT hydromorphone) based on patient history and TERRELL report  --- Refill Pump. No changes at this time.   --- Discuss with Dr. Rios in regards to Hydrocodone. I do not feel comfortable prescribing this after previous experience with patient. This time last year, she was somnolent in the office. She states that was due to her being on Xanax and pain medication. I reviewed she was on Xanax again. She states she isn't. I reviewed TERRELL with her \"the pharmacy is wrong.\" Denies taking Xanax currently, stating \"I'm out.\"  I also reviewed how she had previously expedited the use of the Hydrocodone. She does admit to this previous behavior stating \" I was hurting and when you hurt, you just want it all to stop.\" She denies " "SI/HI. She states she has been out of pain medication a few days. It was filled on 11-16-18, so she should not be have been out. I will send this information to Dr. Vasquez and he can decide about refilling Hydrocodone, but at this time, reviewed it was for \"acute flare-up\" and as she was reporting improvement in her pain level from last office visit, and her IT pump was increased from Hydromorphone 5.5 mg/day to 6.0 mg/day, and her being on Xanax still, I would not be refill this today in office.   --- Reviewed JUAN is 7 months. Will need to discuss pump replacement in very near future. Patient verbalized understanding.   --- Proceed with other specialists as planned.  --- Follow-up for pump refill with Dr. VASQUEZ to discuss long-term plan of care.        TERRELL REPORT    As part of the patient's treatment plan, I am prescribing controlled substances. The patient has been made aware of appropriate use of such medications, including potential risk of somnolence, limited ability to drive and/or work safely, and the potential for dependence or overdose. It has also bee made clear that these medications are for use by this patient only, without concomitant use of alcohol or other substances unless prescribed.     Patient has completed prescribing agreement detailing terms of continued prescribing of controlled substances, including monitoring TERRELL reports, urine drug screening, and pill counts if necessary. The patient is aware that inappropriate use will results in cessation of prescribing such medications.    TERRELL report has been reviewed and scanned into the patient's chart.    As the clinician, I personally reviewed the TERRELL from 12-12-18 while the patient was in the office today.    History and physical exam exhibit continued safe and appropriate use of controlled substances.      EMR Dragon/Transcription disclaimer:   Much of this encounter note is an electronic transcription/translation of spoken language " to printed text. The electronic translation of spoken language may permit erroneous, or at times, nonsensical words or phrases to be inadvertently transcribed; Although I have reviewed the note for such errors, some may still exist.

## 2018-12-14 ENCOUNTER — OFFICE VISIT CONVERTED (OUTPATIENT)
Dept: ONCOLOGY | Facility: HOSPITAL | Age: 53
End: 2018-12-14
Attending: NURSE PRACTITIONER

## 2018-12-19 ENCOUNTER — DOCUMENTATION (OUTPATIENT)
Dept: PAIN MEDICINE | Facility: CLINIC | Age: 53
End: 2018-12-19

## 2018-12-19 ENCOUNTER — TELEPHONE (OUTPATIENT)
Dept: PAIN MEDICINE | Facility: CLINIC | Age: 53
End: 2018-12-19

## 2018-12-19 RX ORDER — TRAMADOL HYDROCHLORIDE 50 MG/1
50 TABLET ORAL 2 TIMES DAILY PRN
Qty: 60 TABLET | Refills: 0 | Status: SHIPPED | OUTPATIENT
Start: 2018-12-19 | End: 2019-01-09 | Stop reason: RX

## 2018-12-19 NOTE — TELEPHONE ENCOUNTER
Patient called and LM wanting to know if Dr. Rios is going to refill her Hydrocodone? Please see Blanca's last office note

## 2018-12-20 ENCOUNTER — PRIOR AUTHORIZATION (OUTPATIENT)
Dept: PAIN MEDICINE | Facility: CLINIC | Age: 53
End: 2018-12-20

## 2018-12-20 NOTE — TELEPHONE ENCOUNTER
No, unfortunately she has demonstrated that she cannot manage it effectively.  We will try something else instead (tramadol)

## 2018-12-26 DIAGNOSIS — M99.04 SOMATIC DYSFUNCTION OF BOTH SACROILIAC JOINTS: Primary | ICD-10-CM

## 2018-12-26 NOTE — TELEPHONE ENCOUNTER
Spoke to patient and explained that we are not going to refill the Hydrocodone. She states that she is looking at having heart surgery and she is just waiting on insurance to approve it so they can schedule the surgery. She also states she is going through some testing to see if her cancer has returned. I explained to her that Tramadol was sent to her pharmacy and insurance denied this but we are working on an appeal. She states that at her next appointment she wanted to see Dr. Rios so she can discuss the increased pain that she is having and I advised her that her next appointment for a pump fill was scheduled with Dr. Rios and she can discuss what she needs to with him at that time. She also states that her doctor refilled her Xanax but the MG has decreased to 0.25, she wanted to make sure Dr. Rios knew that.     She was also asking about having bilateral hip injections. She states she is in PT so that she can try to stand and walk again and states that her hips hurt so bad.

## 2018-12-26 NOTE — TELEPHONE ENCOUNTER
I am not sure what she means by hip injections.... Whether this is hip bursa, hip joint, or sacroiliac joint.....  Previously we were going to do SI joint injections but she no showed a couple of times.    Last note did diagnose SI joint pain... I placed a new order for SI joint injections... Not sure if the previous auth is still good or .

## 2018-12-27 DIAGNOSIS — I50.32 CHRONIC DIASTOLIC CONGESTIVE HEART FAILURE (HCC): ICD-10-CM

## 2018-12-27 DIAGNOSIS — I35.0 NONRHEUMATIC AORTIC VALVE STENOSIS: Primary | ICD-10-CM

## 2018-12-27 DIAGNOSIS — R09.89 CAROTID BRUIT, UNSPECIFIED LATERALITY: ICD-10-CM

## 2019-01-08 ENCOUNTER — HOSPITAL ENCOUNTER (OUTPATIENT)
Dept: MAMMOGRAPHY | Facility: HOSPITAL | Age: 54
Discharge: HOME | End: 2019-01-08
Attending: NURSE PRACTITIONER

## 2019-01-09 ENCOUNTER — OFFICE VISIT (OUTPATIENT)
Dept: PAIN MEDICINE | Facility: CLINIC | Age: 54
End: 2019-01-09

## 2019-01-09 VITALS
HEART RATE: 94 BPM | HEIGHT: 67 IN | BODY MASS INDEX: 27.47 KG/M2 | OXYGEN SATURATION: 92 % | WEIGHT: 175 LBS | DIASTOLIC BLOOD PRESSURE: 87 MMHG | SYSTOLIC BLOOD PRESSURE: 131 MMHG | TEMPERATURE: 98 F | RESPIRATION RATE: 16 BRPM

## 2019-01-09 DIAGNOSIS — Z97.8 PRESENCE OF INTRATHECAL PUMP: Primary | ICD-10-CM

## 2019-01-09 PROCEDURE — 62369 ANAL SP INF PMP W/REPRG&FILL: CPT | Performed by: ANESTHESIOLOGY

## 2019-01-09 NOTE — PROGRESS NOTES
CHIEF COMPLAINT  Pt states her LBP,bilateral hip and bilat. leg pain has increased since 12/13/18 office visit, and has had a pelvic CT scan done in [facility] on 1/6/19; Pt will see oncologist on 1/14/19. Pt is here for a pump refill today.  Pt will know on 1/11/19 when heart surgery is to be scheduled.    Subjective   Isha Llanes is a 53 y.o. female  who presents to the office for follow-up.She has a history of back pain and cancer pain.    Intrathecal pump was interrogated in office today. Results are in chart.    The patient is currently at a dose of 6mg/0.75mg of hydromorphone/ropivacaine per day. There is the addition of PTM bolus. This is set at 0.8mg/0.1mg/actuation given over 10 minutes. The lock-out is set at 2h hours with a maximum activation of 7 in 24 hours.  91 successful activations, 7 lockouts, 8 unsuccessful.    Under sterile technique, the pump was accessed using a [employer] proprietary refill kit. The volume was withdrawn from the pump. The expected residual volume of the pump was 8.5ml ml. The actual residual volume of the pump was 7.5 ml.    The pump was then refilled with 20 ml of hydromorphone/ropivacaine at a concentration of 20/2.5 mg/ml. The pump was set to continue at its previous rate basally.  The PTM dose is increased to 1.2mg hydromorphone with qs ropivacaine.      Patient tolerated procedure well. Minimal bleeding was noted. Pump site remains intact with no evidence of erythema or drainage.    Patient will return for pump refill when due or sooner if needed.     JUAN is 6 months.      History of Present Illness     PEG Assessment   What number best describes your pain on average in the past week?8  What number best describes how, during the past week, pain has interfered with your enjoyment of life?9  What number best describes how, during the past week, pain has interfered with your general activity?  9    --  The aforementioned information the Chief Complaint section and  "above subjective data including any HPI data has been personally reviewed and affirmed.  --        The following portions of the patient's history were reviewed and updated as appropriate: allergies, current medications, past family history, past medical history, past social history, past surgical history and problem list.    Review of Systems   Constitutional: Negative for activity change (in wheelchair \"most of the time\"), chills and fever.   Respiratory: Negative for apnea and shortness of breath.    Cardiovascular: Positive for leg swelling (decreased: Pt to have heart surgery \"soon\",date not determined yet.). Negative for chest pain (occasionally).   Gastrointestinal: Negative for constipation (\"fluctuates between diarrhea and constipation\"\"mild\"), diarrhea, nausea and vomiting.   Genitourinary: Positive for pelvic pain (to see oncologist). Negative for difficulty urinating, dyspareunia, dysuria and vaginal discharge.   Musculoskeletal: Positive for back pain and neck pain.   Skin: Wound: \"vasculitis\" causing skin dryness(?)   Neurological: Positive for weakness (legs bilat.) and numbness (legs and feet bilaterally). Negative for dizziness, light-headedness and headaches.   Psychiatric/Behavioral: Positive for sleep disturbance (slept 1 hour last night). Negative for confusion, hallucinations, self-injury and suicidal ideas. Behavioral problem: dozing off. The patient is not nervous/anxious.          Vitals:    01/09/19 1339   BP: 131/87   Pulse: 94   Resp: 16   Temp: 98 °F (36.7 °C)   SpO2: 92%   Weight: 79.4 kg (175 lb)   Height: 170.2 cm (67.01\")   PainSc:   7   PainLoc: Back  Comment: LBP ranges from 7-8/10         Objective   Physical Exam  VSS, NNR, NCAT, NMNA, NRD, AAOx3.  Sickly appearance unchanged.      Assessment/Plan   Isha was seen today for back pain.    Diagnoses and all orders for this visit:    Presence of intrathecal pump        --- Follow-up for next pump refill    -- no oral pain " medication at this time                     EMR Dragon/Transcription disclaimer:   Much of this encounter note is an electronic transcription/translation of spoken language to printed text. The electronic translation of spoken language may permit erroneous, or at times, nonsensical words or phrases to be inadvertently transcribed; Although I have reviewed the note for such errors, some may still exist.

## 2019-01-11 ENCOUNTER — APPOINTMENT (OUTPATIENT)
Dept: CARDIOLOGY | Facility: HOSPITAL | Age: 54
End: 2019-01-11
Attending: THORACIC SURGERY (CARDIOTHORACIC VASCULAR SURGERY)

## 2019-01-11 ENCOUNTER — APPOINTMENT (OUTPATIENT)
Dept: CT IMAGING | Facility: HOSPITAL | Age: 54
End: 2019-01-11
Attending: THORACIC SURGERY (CARDIOTHORACIC VASCULAR SURGERY)

## 2019-01-11 ENCOUNTER — OFFICE VISIT (OUTPATIENT)
Dept: CARDIOLOGY | Facility: CLINIC | Age: 54
End: 2019-01-11

## 2019-01-11 ENCOUNTER — APPOINTMENT (OUTPATIENT)
Dept: RESPIRATORY THERAPY | Facility: HOSPITAL | Age: 54
End: 2019-01-11
Attending: THORACIC SURGERY (CARDIOTHORACIC VASCULAR SURGERY)

## 2019-01-11 VITALS
HEART RATE: 88 BPM | BODY MASS INDEX: 26.37 KG/M2 | SYSTOLIC BLOOD PRESSURE: 118 MMHG | HEIGHT: 67 IN | DIASTOLIC BLOOD PRESSURE: 70 MMHG | WEIGHT: 168 LBS

## 2019-01-11 DIAGNOSIS — I35.0 AORTIC STENOSIS, SEVERE: Primary | ICD-10-CM

## 2019-01-11 PROCEDURE — 93000 ELECTROCARDIOGRAM COMPLETE: CPT | Performed by: INTERNAL MEDICINE

## 2019-01-11 PROCEDURE — 99204 OFFICE O/P NEW MOD 45 MIN: CPT | Performed by: INTERNAL MEDICINE

## 2019-01-11 NOTE — PROGRESS NOTES
Isha Llanes  1965  Date of Office Visit: 01/11/2019  Encounter Provider: Wayne Johnson MD  Place of Service: [facility] CARDIOLOGY      CHIEF COMPLAINT:   Aortic valve stenosis    HISTORY OF PRESENT ILLNESS:  53-year-old female to medical history of prior pulmonary embolus, protein C and S deficiency, Hodgkin's lymphoma with radiation, spinal and sacroiliac disease with immobility, who is wheelchair-bound.  Patient has a intrathecal pain pump as well.The patient has multiple complaints. Her complaints are bilateral lower extremity edema along with symptoms that sound to be presyncope or syncope. These occur when she is sitting her wheelchair. She does not do anything for activity. She does report mild orthopnea as well. She had and evaluation at [facility] which I have reviewed. Her echocardiogram reveals normal left ventricular size and systolic function. The valve does not appear to be severely calcified and looks like it opens reasonably well, however, the gradients across it are around 40 to 45 mmHg. There is mild aortic valve regurgitation also documented. It looks to be tricuspid on my review. She also had coronary angiography without left ventricular angiography or left heart catheterization. She had no significant coronary artery disease. She presents here for evaluation.        Review of Systems   Constitution: Negative for fever, weakness and malaise/fatigue.   HENT: Negative for nosebleeds and sore throat.    Eyes: Negative for blurred vision and double vision.   Cardiovascular: Negative for chest pain, claudication, palpitations and syncope.   Respiratory: Negative for cough, shortness of breath and snoring.    Endocrine: Negative for cold intolerance, heat intolerance and polydipsia.   Skin: Negative for itching, poor wound healing and rash.   Musculoskeletal: Negative for joint pain, joint swelling, muscle weakness and myalgias.  "  Gastrointestinal: Negative for abdominal pain, melena, nausea and vomiting.   Neurological: Negative for light-headedness, loss of balance, seizures and vertigo.   Psychiatric/Behavioral: Negative for altered mental status and depression.       Past Medical History:   Diagnosis Date   • Anxiety    • Anxiety    • Aortic stenosis, severe    • Arthritis    • Arthritis    • Back pain    • Blood clotting disorder (CMS/HCC)    • Cancer associated pain    • Chronic low back pain with sciatica    • Dyspnea on effort    • Hodgkin's lymphoma (CMS/HCC)    • Immobility    • Low back pain    • Lupus    • Mid back pain    • Other pulmonary embolism without acute cor pulmonale (CMS/HCC)    • Pelvic pain    • Peripheral neuropathy    • Presence of intrathecal pump    • Sacroiliac joint disease    • SI (sacroiliac) joint inflammation (CMS/HCC)        The following portions of the patient's history were reviewed and updated as appropriate: Social history , Family history and Surgical history     Current Outpatient Medications on File Prior to Visit   Medication Sig Dispense Refill   • acetaminophen (TYLENOL) 325 MG tablet Take 325 mg by mouth As Needed for Mild Pain (1-3).     • albuterol (PROVENTIL HFA;VENTOLIN HFA) 108 (90 Base) MCG/ACT inhaler Inhale 2 puffs Every 4 (Four) Hours As Needed for Wheezing.     • furosemide (LASIX) 40 MG tablet Take 40 mg by mouth Daily.     • gabapentin (NEURONTIN) 300 MG capsule Take 300 mg by mouth 3 (Three) Times a Day.     • SITagliptin (JANUVIA) 100 MG tablet Take 100 mg by mouth Daily.       No current facility-administered medications on file prior to visit.        Allergies   Allergen Reactions   • Amoxicillin    • Ceclor [Cefaclor]    • Compazine [Prochlorperazine Edisylate]    • Contrast Dye    • Penicillins    • Phenergan [Promethazine Hcl]    • Sulfa Antibiotics        Vitals:    01/11/19 1433   BP: 118/70   Pulse: 88   Weight: 76.2 kg (168 lb)   Height: 170.2 cm (67.01\")     Physical " Exam   Constitutional: She is oriented to person, place, and time. She appears well-developed and well-nourished.   HENT:   Head: Normocephalic and atraumatic.   Eyes: Conjunctivae and EOM are normal. No scleral icterus.   Neck: Normal range of motion. Neck supple. Normal carotid pulses, no hepatojugular reflux and no JVD present. Carotid bruit is not present. No tracheal deviation present. No thyromegaly present.   Cardiovascular: Normal rate and regular rhythm. Exam reveals no gallop and no friction rub.   No murmur heard.  Pulses:       Carotid pulses are 2+ on the right side, and 2+ on the left side.       Radial pulses are 2+ on the right side, and 2+ on the left side.        Femoral pulses are 2+ on the right side, and 2+ on the left side.       Dorsalis pedis pulses are 2+ on the right side, and 2+ on the left side.        Posterior tibial pulses are 2+ on the right side, and 2+ on the left side.   Pulmonary/Chest: Breath sounds normal. No respiratory distress. She has no decreased breath sounds. She has no wheezes. She has no rhonchi. She has no rales. She exhibits no tenderness.   Abdominal: Soft. Bowel sounds are normal. She exhibits no distension. There is no tenderness. There is no rebound.   Musculoskeletal: She exhibits no edema or deformity.   Neurological: She is alert and oriented to person, place, and time. She has normal strength. No sensory deficit.   Skin: No rash noted. No erythema.   Psychiatric: She has a normal mood and affect. Her behavior is normal.       No components found for: CBC  No results found for: CMP  No components found for: LIPID  No results found for: BMP      ECG 12 Lead  Date/Time: 1/11/2019 3:13 PM  Performed by: Wayne Johnson MD  Authorized by: Wayne Johnson MD   Comparison: compared with previous ECG from 8/5/2018  Similar to previous ECG  Rhythm: sinus rhythm  ST Segments: ST segments normal  T Waves: T waves normal  Clinical impression: normal  ECG             Transthoracic echocardiogram 8/2/18  Severe aortic valve stenosis.  Valve area 0.65 cm².  Velocity peak 4.4 m/s.  Mean gradient 44 mmHg.  Mild tricuspid valve regurgitation  Normal left ventricular size and systolic function.    DISCUSSION/SUMMARYA 53-year-old female who presents to me with multiple complaints including lower extremity edema and dyspnea along with presyncope. Her lower extremity edema clearly looks to be secondary to venous insufficiency and she daniels snot look to be clinically volume overloaded. Her shortness of breath and presyncope could be secondary to aortic valve stenosis. However, I find her whole picture a little confusing. She is only 53 years of age and she has must mild calcification of her aortic valve. It appears to open okay but the gradients across it are consistent with severe AS.     I think before moving forward with any additional transcatheter heart valve workup we should consider a transesophageal echocardiogram to better review the anatomy and assess the gradients across that valve. If it truly is tricuspid with severe stenosis and there is no concern for outflow tract gradient contributing, which I do not think there will be, I would consider moving forward with transcatheter workup on her.

## 2019-01-14 ENCOUNTER — HOSPITAL ENCOUNTER (OUTPATIENT)
Dept: OTHER | Facility: HOSPITAL | Age: 54
Discharge: HOME | End: 2019-01-14
Attending: NURSE PRACTITIONER

## 2019-01-14 ENCOUNTER — OFFICE VISIT CONVERTED (OUTPATIENT)
Dept: ONCOLOGY | Facility: HOSPITAL | Age: 54
End: 2019-01-14
Attending: NURSE PRACTITIONER

## 2019-01-21 ENCOUNTER — APPOINTMENT (OUTPATIENT)
Dept: RESPIRATORY THERAPY | Facility: HOSPITAL | Age: 54
End: 2019-01-21
Attending: THORACIC SURGERY (CARDIOTHORACIC VASCULAR SURGERY)

## 2019-01-21 ENCOUNTER — APPOINTMENT (OUTPATIENT)
Dept: CT IMAGING | Facility: HOSPITAL | Age: 54
End: 2019-01-21
Attending: THORACIC SURGERY (CARDIOTHORACIC VASCULAR SURGERY)

## 2019-01-21 ENCOUNTER — APPOINTMENT (OUTPATIENT)
Dept: CARDIOLOGY | Facility: HOSPITAL | Age: 54
End: 2019-01-21
Attending: THORACIC SURGERY (CARDIOTHORACIC VASCULAR SURGERY)

## 2019-01-22 ENCOUNTER — OFFICE VISIT CONVERTED (OUTPATIENT)
Dept: SURGERY | Facility: CLINIC | Age: 54
End: 2019-01-22
Attending: SURGERY

## 2019-01-30 ENCOUNTER — OFFICE VISIT (OUTPATIENT)
Dept: PAIN MEDICINE | Facility: CLINIC | Age: 54
End: 2019-01-30

## 2019-01-30 VITALS
TEMPERATURE: 98.1 F | OXYGEN SATURATION: 95 % | HEIGHT: 67 IN | WEIGHT: 180 LBS | SYSTOLIC BLOOD PRESSURE: 117 MMHG | RESPIRATION RATE: 16 BRPM | BODY MASS INDEX: 28.25 KG/M2 | DIASTOLIC BLOOD PRESSURE: 81 MMHG | HEART RATE: 94 BPM

## 2019-01-30 DIAGNOSIS — Z97.8 PRESENCE OF INTRATHECAL PUMP: ICD-10-CM

## 2019-01-30 DIAGNOSIS — M54.41 CHRONIC RIGHT-SIDED LOW BACK PAIN WITH BILATERAL SCIATICA: ICD-10-CM

## 2019-01-30 DIAGNOSIS — G89.29 CHRONIC RIGHT-SIDED LOW BACK PAIN WITH BILATERAL SCIATICA: ICD-10-CM

## 2019-01-30 DIAGNOSIS — M54.42 CHRONIC RIGHT-SIDED LOW BACK PAIN WITH BILATERAL SCIATICA: ICD-10-CM

## 2019-01-30 DIAGNOSIS — G89.4 CHRONIC PAIN SYNDROME: Primary | ICD-10-CM

## 2019-01-30 DIAGNOSIS — M46.1 SACROILIITIS (HCC): ICD-10-CM

## 2019-01-30 PROCEDURE — 62369 ANAL SP INF PMP W/REPRG&FILL: CPT | Performed by: ANESTHESIOLOGY

## 2019-01-30 PROCEDURE — 99212 OFFICE O/P EST SF 10 MIN: CPT | Performed by: ANESTHESIOLOGY

## 2019-01-30 RX ORDER — TRAMADOL HYDROCHLORIDE 50 MG/1
50 TABLET ORAL 2 TIMES DAILY PRN
COMMUNITY
End: 2019-06-12 | Stop reason: SDUPTHER

## 2019-01-30 RX ORDER — ALPRAZOLAM 0.25 MG/1
0.25 TABLET ORAL 2 TIMES DAILY PRN
COMMUNITY
End: 2019-03-20

## 2019-01-31 ENCOUNTER — RESULTS ENCOUNTER (OUTPATIENT)
Dept: PAIN MEDICINE | Facility: CLINIC | Age: 54
End: 2019-01-31

## 2019-01-31 DIAGNOSIS — G89.4 CHRONIC PAIN SYNDROME: ICD-10-CM

## 2019-01-31 DIAGNOSIS — M54.42 CHRONIC RIGHT-SIDED LOW BACK PAIN WITH BILATERAL SCIATICA: ICD-10-CM

## 2019-01-31 DIAGNOSIS — Z97.8 PRESENCE OF INTRATHECAL PUMP: ICD-10-CM

## 2019-01-31 DIAGNOSIS — M46.1 SACROILIITIS (HCC): ICD-10-CM

## 2019-01-31 DIAGNOSIS — G89.29 CHRONIC RIGHT-SIDED LOW BACK PAIN WITH BILATERAL SCIATICA: ICD-10-CM

## 2019-01-31 DIAGNOSIS — M54.41 CHRONIC RIGHT-SIDED LOW BACK PAIN WITH BILATERAL SCIATICA: ICD-10-CM

## 2019-02-07 ENCOUNTER — HOSPITAL ENCOUNTER (OUTPATIENT)
Dept: POSTOP/PACU | Facility: HOSPITAL | Age: 54
Discharge: HOME | End: 2019-02-07
Attending: INTERNAL MEDICINE | Admitting: INTERNAL MEDICINE

## 2019-02-07 ENCOUNTER — DOCUMENTATION (OUTPATIENT)
Dept: CARDIOLOGY | Facility: CLINIC | Age: 54
End: 2019-02-07

## 2019-02-07 ENCOUNTER — ANESTHESIA (OUTPATIENT)
Dept: POSTOP/PACU | Facility: HOSPITAL | Age: 54
End: 2019-02-07

## 2019-02-07 ENCOUNTER — ANESTHESIA EVENT (OUTPATIENT)
Dept: POSTOP/PACU | Facility: HOSPITAL | Age: 54
End: 2019-02-07

## 2019-02-07 VITALS — OXYGEN SATURATION: 96 % | SYSTOLIC BLOOD PRESSURE: 90 MMHG | DIASTOLIC BLOOD PRESSURE: 63 MMHG

## 2019-02-07 VITALS
DIASTOLIC BLOOD PRESSURE: 80 MMHG | OXYGEN SATURATION: 95 % | HEART RATE: 87 BPM | RESPIRATION RATE: 18 BRPM | SYSTOLIC BLOOD PRESSURE: 134 MMHG | TEMPERATURE: 97.9 F

## 2019-02-07 DIAGNOSIS — I35.0 AORTIC STENOSIS, SEVERE: ICD-10-CM

## 2019-02-07 LAB
BH CV ECHO MEAS - AO MAX PG: 109.4 MMHG
BH CV ECHO MEAS - AO MEAN PG: 65 MMHG
BH CV ECHO MEAS - AO V2 MAX: 523 CM/SEC
BH CV ECHO MEAS - AO V2 MEAN: 380 CM/SEC
BH CV ECHO MEAS - AO V2 VTI: 102 CM
BH CV ECHO MEAS - BSA(HAYCOCK): 2 M^2
BH CV ECHO MEAS - BSA: 1.9 M^2
BH CV ECHO MEAS - BZI_BMI: 29.1 KILOGRAMS/M^2
BH CV ECHO MEAS - BZI_METRIC_HEIGHT: 167.6 CM
BH CV ECHO MEAS - BZI_METRIC_WEIGHT: 81.6 KG
BH CV ECHO MEAS - MV MAX PG: 3.7 MMHG
BH CV ECHO MEAS - MV MEAN PG: 2 MMHG
BH CV ECHO MEAS - MV V2 MAX: 96.7 CM/SEC
BH CV ECHO MEAS - MV V2 MEAN: 71.1 CM/SEC
BH CV ECHO MEAS - MV V2 VTI: 29.4 CM
BH CV ECHO MEAS - RAP SYSTOLE: 3 MMHG
BH CV ECHO MEAS - RVSP: 29 MMHG
BH CV ECHO MEAS - TR MAX VEL: 257 CM/SEC
BH CV VAS BP RIGHT ARM: NORMAL MMHG

## 2019-02-07 PROCEDURE — 93325 DOPPLER ECHO COLOR FLOW MAPG: CPT | Performed by: INTERNAL MEDICINE

## 2019-02-07 PROCEDURE — 93320 DOPPLER ECHO COMPLETE: CPT | Performed by: INTERNAL MEDICINE

## 2019-02-07 PROCEDURE — 93320 DOPPLER ECHO COMPLETE: CPT

## 2019-02-07 PROCEDURE — 93325 DOPPLER ECHO COLOR FLOW MAPG: CPT

## 2019-02-07 PROCEDURE — 93312 ECHO TRANSESOPHAGEAL: CPT

## 2019-02-07 PROCEDURE — 25010000002 PROPOFOL 10 MG/ML EMULSION: Performed by: NURSE ANESTHETIST, CERTIFIED REGISTERED

## 2019-02-07 PROCEDURE — 93312 ECHO TRANSESOPHAGEAL: CPT | Performed by: INTERNAL MEDICINE

## 2019-02-07 RX ORDER — PROPOFOL 10 MG/ML
VIAL (ML) INTRAVENOUS AS NEEDED
Status: DISCONTINUED | OUTPATIENT
Start: 2019-02-07 | End: 2019-02-07 | Stop reason: SURG

## 2019-02-07 RX ORDER — MIDAZOLAM HYDROCHLORIDE 1 MG/ML
1 INJECTION INTRAMUSCULAR; INTRAVENOUS
Status: DISCONTINUED | OUTPATIENT
Start: 2019-02-07 | End: 2019-02-08 | Stop reason: HOSPADM

## 2019-02-07 RX ORDER — SODIUM CHLORIDE 0.9 % (FLUSH) 0.9 %
1-10 SYRINGE (ML) INJECTION AS NEEDED
Status: DISCONTINUED | OUTPATIENT
Start: 2019-02-07 | End: 2019-02-08 | Stop reason: HOSPADM

## 2019-02-07 RX ORDER — FENTANYL CITRATE 50 UG/ML
50 INJECTION, SOLUTION INTRAMUSCULAR; INTRAVENOUS
Status: DISCONTINUED | OUTPATIENT
Start: 2019-02-07 | End: 2019-02-08 | Stop reason: HOSPADM

## 2019-02-07 RX ORDER — FAMOTIDINE 10 MG/ML
20 INJECTION, SOLUTION INTRAVENOUS ONCE
Status: COMPLETED | OUTPATIENT
Start: 2019-02-07 | End: 2019-02-07

## 2019-02-07 RX ORDER — SODIUM CHLORIDE, SODIUM LACTATE, POTASSIUM CHLORIDE, CALCIUM CHLORIDE 600; 310; 30; 20 MG/100ML; MG/100ML; MG/100ML; MG/100ML
9 INJECTION, SOLUTION INTRAVENOUS CONTINUOUS
Status: DISCONTINUED | OUTPATIENT
Start: 2019-02-07 | End: 2019-02-08 | Stop reason: HOSPADM

## 2019-02-07 RX ORDER — LIDOCAINE HYDROCHLORIDE 10 MG/ML
INJECTION, SOLUTION INFILTRATION; PERINEURAL AS NEEDED
Status: DISCONTINUED | OUTPATIENT
Start: 2019-02-07 | End: 2019-02-07 | Stop reason: SURG

## 2019-02-07 RX ORDER — LIDOCAINE HYDROCHLORIDE 10 MG/ML
0.5 INJECTION, SOLUTION EPIDURAL; INFILTRATION; INTRACAUDAL; PERINEURAL ONCE AS NEEDED
Status: DISCONTINUED | OUTPATIENT
Start: 2019-02-07 | End: 2019-02-08 | Stop reason: HOSPADM

## 2019-02-07 RX ORDER — MIDAZOLAM HYDROCHLORIDE 1 MG/ML
2 INJECTION INTRAMUSCULAR; INTRAVENOUS
Status: DISCONTINUED | OUTPATIENT
Start: 2019-02-07 | End: 2019-02-08 | Stop reason: HOSPADM

## 2019-02-07 RX ADMIN — PROPOFOL 20 MG: 10 INJECTION, EMULSION INTRAVENOUS at 07:49

## 2019-02-07 RX ADMIN — PROPOFOL 20 MG: 10 INJECTION, EMULSION INTRAVENOUS at 07:58

## 2019-02-07 RX ADMIN — EPHEDRINE SULFATE 5 MG: 50 INJECTION INTRAMUSCULAR; INTRAVENOUS; SUBCUTANEOUS at 08:03

## 2019-02-07 RX ADMIN — PROPOFOL 20 MG: 10 INJECTION, EMULSION INTRAVENOUS at 07:46

## 2019-02-07 RX ADMIN — LIDOCAINE HYDROCHLORIDE 30 ML: 10 INJECTION, SOLUTION INFILTRATION; PERINEURAL at 07:41

## 2019-02-07 RX ADMIN — EPHEDRINE SULFATE 10 MG: 50 INJECTION INTRAMUSCULAR; INTRAVENOUS; SUBCUTANEOUS at 07:50

## 2019-02-07 RX ADMIN — PROPOFOL 100 MG: 10 INJECTION, EMULSION INTRAVENOUS at 07:41

## 2019-02-07 RX ADMIN — PROPOFOL 20 MG: 10 INJECTION, EMULSION INTRAVENOUS at 07:55

## 2019-02-07 RX ADMIN — PROPOFOL 20 MG: 10 INJECTION, EMULSION INTRAVENOUS at 08:06

## 2019-02-07 RX ADMIN — PROPOFOL 20 MG: 10 INJECTION, EMULSION INTRAVENOUS at 07:52

## 2019-02-07 RX ADMIN — PROPOFOL 20 MG: 10 INJECTION, EMULSION INTRAVENOUS at 08:02

## 2019-02-07 RX ADMIN — LIDOCAINE HYDROCHLORIDE 15 ML: 20 SOLUTION ORAL; TOPICAL at 07:30

## 2019-02-07 RX ADMIN — SODIUM CHLORIDE, POTASSIUM CHLORIDE, SODIUM LACTATE AND CALCIUM CHLORIDE: 600; 310; 30; 20 INJECTION, SOLUTION INTRAVENOUS at 07:39

## 2019-02-07 RX ADMIN — FAMOTIDINE 20 MG: 10 INJECTION, SOLUTION INTRAVENOUS at 07:14

## 2019-02-07 RX ADMIN — PROPOFOL 20 MG: 10 INJECTION, EMULSION INTRAVENOUS at 07:44

## 2019-02-07 NOTE — PERIOPERATIVE NURSING NOTE
JONATHON Alvarez called Lizette Vu RN for assistance as pt verbalizing desire to leave before [employer] arrives.  Lizette spoke with patient and explained need for pt to stay on unit until company arrives.  Pt insists on leaving.  Lizette assisted pt with getting dressed and accompanied patient downstairs to await for [employer]. DC instructions reviewed with pt by Kim Carbajal RN and with patient's daughter, Sheila, via phone.  Questions answered.

## 2019-02-07 NOTE — ANESTHESIA POSTPROCEDURE EVALUATION
Patient: Isha Llanes    Procedure Summary     Date:  02/07/19 Room / Location:  [facility] PACU    Anesthesia Start:  0738 Anesthesia Stop:  0814    Procedure:  ADULT TRANSESOPHAGEAL ECHO (CAREY) W/ CONT IF NECESSARY PER PROTOCOL Diagnosis:       Aortic stenosis, severe      (Valvular Disease)      (Aortic Valve Assessment)    Scheduled Providers:  Janeth Mcgowan MD Provider:  Ryan Caruso MD    Anesthesia Type:  MAC ASA Status:  3          Anesthesia Type: MAC  Last vitals  BP   104/76 (02/07/19 1100)   Temp   36.6 °C (97.9 °F) (02/07/19 0627)   Pulse   86 (02/07/19 1100)   Resp   18 (02/07/19 1100)     SpO2   93 % (02/07/19 1100)     Post Anesthesia Care and Evaluation    Patient location during evaluation: PACU  Patient participation: complete - patient participated  Level of consciousness: awake and alert  Pain management: adequate  Airway patency: patent  Anesthetic complications: No anesthetic complications    Cardiovascular status: acceptable  Respiratory status: acceptable  Hydration status: acceptable    Comments: /76 (BP Location: Left arm, Patient Position: Sitting)   Pulse 86   Temp 36.6 °C (97.9 °F) (Oral)   Resp 18   SpO2 93%

## 2019-02-07 NOTE — ANESTHESIA PREPROCEDURE EVALUATION
Anesthesia Evaluation     Patient summary reviewed and Nursing notes reviewed   no history of anesthetic complications:  NPO Solid Status: > 8 hours  NPO Liquid Status: > 8 hours           Airway   Mallampati: II  Dental      Pulmonary - normal exam   (+) pulmonary embolism, a smoker Current, shortness of breath,   Cardiovascular - normal exam    (+) valvular problems/murmurs AS,       Neuro/Psych  (+) numbness, psychiatric history Anxiety,     GI/Hepatic/Renal/Endo      Musculoskeletal     (+) back pain,   Abdominal    Substance History      OB/GYN          Other   (+) arthritis   history of cancer remission                    Anesthesia Plan    ASA 3     MAC     intravenous induction   Anesthetic plan, all risks, benefits, and alternatives have been provided, discussed and informed consent has been obtained with: patient.

## 2019-02-07 NOTE — PROGRESS NOTES
Subjective:     Encounter Date:02/07/2019    Please refer to office note from 1/11/2019 and apply as H&P.

## 2019-02-07 NOTE — PERIOPERATIVE NURSING NOTE
Second call placed to [employer].  Spoke with Maria R and she states the  should arrive by 11:50am.  Pt updated by Lizette Vu RN

## 2019-02-18 ENCOUNTER — TELEPHONE (OUTPATIENT)
Dept: CARDIOLOGY | Facility: CLINIC | Age: 54
End: 2019-02-18

## 2019-02-18 NOTE — TELEPHONE ENCOUNTER
Pt called stating she is returning your phone call. Please call pt at your earliest convenience. Thanks, Rebecca

## 2019-02-19 DIAGNOSIS — I35.0 NONRHEUMATIC AORTIC VALVE STENOSIS: Primary | ICD-10-CM

## 2019-02-19 NOTE — TELEPHONE ENCOUNTER
Pt called and would like to know what the plan is with her TAVR after her CAREY.  She can be called at #[phone].    Thanks,  Janki

## 2019-02-22 PROBLEM — I35.0 NONRHEUMATIC AORTIC VALVE STENOSIS: Status: ACTIVE | Noted: 2019-02-22

## 2019-02-27 ENCOUNTER — OFFICE VISIT (OUTPATIENT)
Dept: PAIN MEDICINE | Facility: CLINIC | Age: 54
End: 2019-02-27

## 2019-02-27 VITALS
TEMPERATURE: 98.8 F | BODY MASS INDEX: 27.62 KG/M2 | OXYGEN SATURATION: 98 % | WEIGHT: 176 LBS | RESPIRATION RATE: 16 BRPM | SYSTOLIC BLOOD PRESSURE: 145 MMHG | HEIGHT: 67 IN | HEART RATE: 94 BPM | DIASTOLIC BLOOD PRESSURE: 78 MMHG

## 2019-02-27 DIAGNOSIS — M96.1 POSTLAMINECTOMY SYNDROME OF LUMBAR REGION: ICD-10-CM

## 2019-02-27 DIAGNOSIS — G89.4 CHRONIC PAIN SYNDROME: Primary | ICD-10-CM

## 2019-02-27 DIAGNOSIS — Z97.8 PRESENCE OF INTRATHECAL PUMP: ICD-10-CM

## 2019-02-27 DIAGNOSIS — M51.36 DDD (DEGENERATIVE DISC DISEASE), LUMBAR: ICD-10-CM

## 2019-02-27 DIAGNOSIS — M54.16 LUMBAR RADICULOPATHY: ICD-10-CM

## 2019-02-27 PROCEDURE — 62369 ANAL SP INF PMP W/REPRG&FILL: CPT | Performed by: ANESTHESIOLOGY

## 2019-02-27 PROCEDURE — 99213 OFFICE O/P EST LOW 20 MIN: CPT | Performed by: ANESTHESIOLOGY

## 2019-03-05 ENCOUNTER — HOSPITAL ENCOUNTER (OUTPATIENT)
Dept: OTHER | Facility: HOSPITAL | Age: 54
Discharge: HOME | End: 2019-03-05
Attending: INTERNAL MEDICINE

## 2019-03-05 LAB
ANION GAP SERPL CALC-SCNC: 14 MMOL/L (ref 8–19)
BASOPHILS # BLD AUTO: 0.02 10*3/UL (ref 0–0.2)
BASOPHILS NFR BLD AUTO: 0.3 % (ref 0–3)
BUN SERPL-MCNC: 11 MG/DL (ref 5–25)
BUN/CREAT SERPL: 17 {RATIO} (ref 6–20)
CALCIUM SERPL-MCNC: 8.7 MG/DL (ref 8.7–10.4)
CHLORIDE SERPL-SCNC: 101 MMOL/L (ref 99–111)
CONV ABS IMM GRAN: 0.02 10*3/UL (ref 0–0.2)
CONV CO2: 27 MMOL/L (ref 22–32)
CONV IMMATURE GRAN: 0.3 % (ref 0–1.8)
CREAT UR-MCNC: 0.64 MG/DL (ref 0.5–0.9)
DEPRECATED RDW RBC AUTO: 47 FL (ref 36.4–46.3)
EOSINOPHIL # BLD AUTO: 0.12 10*3/UL (ref 0–0.7)
EOSINOPHIL # BLD AUTO: 1.9 % (ref 0–7)
ERYTHROCYTE [DISTWIDTH] IN BLOOD BY AUTOMATED COUNT: 15.1 % (ref 11.7–14.4)
GFR SERPLBLD BASED ON 1.73 SQ M-ARVRAT: >60 ML/MIN/{1.73_M2}
GLUCOSE SERPL-MCNC: 164 MG/DL (ref 65–99)
HBA1C MFR BLD: 13.6 G/DL (ref 12–16)
HCT VFR BLD AUTO: 43.6 % (ref 37–47)
LYMPHOCYTES # BLD AUTO: 1.49 10*3/UL (ref 1–5)
MCH RBC QN AUTO: 27 PG (ref 27–31)
MCHC RBC AUTO-ENTMCNC: 31.2 G/DL (ref 33–37)
MCV RBC AUTO: 86.7 FL (ref 81–99)
MONOCYTES # BLD AUTO: 0.53 10*3/UL (ref 0.2–1.2)
MONOCYTES NFR BLD AUTO: 8.6 % (ref 3–10)
NEUTROPHILS # BLD AUTO: 4.01 10*3/UL (ref 2–8)
NEUTROPHILS NFR BLD AUTO: 64.8 % (ref 30–85)
NRBC CBCN: 0 % (ref 0–0.7)
OSMOLALITY SERPL CALC.SUM OF ELEC: 289 MOSM/KG (ref 273–304)
PLATELET # BLD AUTO: 162 10*3/UL (ref 130–400)
PMV BLD AUTO: 10.3 FL (ref 9.4–12.3)
POTASSIUM SERPL-SCNC: 4.4 MMOL/L (ref 3.5–5.3)
RBC # BLD AUTO: 5.03 10*6/UL (ref 4.2–5.4)
SODIUM SERPL-SCNC: 138 MMOL/L (ref 135–147)
VARIANT LYMPHS NFR BLD MANUAL: 24.1 % (ref 20–45)
WBC # BLD AUTO: 6.19 10*3/UL (ref 4.8–10.8)

## 2019-03-06 ENCOUNTER — APPOINTMENT (OUTPATIENT)
Dept: CARDIOLOGY | Facility: HOSPITAL | Age: 54
End: 2019-03-06

## 2019-03-06 ENCOUNTER — ANESTHESIA (OUTPATIENT)
Dept: CARDIOLOGY | Facility: HOSPITAL | Age: 54
End: 2019-03-06

## 2019-03-06 ENCOUNTER — ANESTHESIA EVENT (OUTPATIENT)
Dept: CARDIOLOGY | Facility: HOSPITAL | Age: 54
End: 2019-03-06

## 2019-03-06 ENCOUNTER — HOSPITAL ENCOUNTER (OUTPATIENT)
Facility: HOSPITAL | Age: 54
Discharge: HOME | End: 2019-03-07
Attending: INTERNAL MEDICINE | Admitting: INTERNAL MEDICINE

## 2019-03-06 DIAGNOSIS — I35.0 NONRHEUMATIC AORTIC VALVE STENOSIS: ICD-10-CM

## 2019-03-06 LAB
BH CV ECHO MEAS - AI DEC SLOPE: 193 CM/SEC^2
BH CV ECHO MEAS - AI MAX PG: 49.6 MMHG
BH CV ECHO MEAS - AI MAX VEL: 352 CM/SEC
BH CV ECHO MEAS - AI P1/2T: 534.2 MSEC
BH CV ECHO MEAS - AO MAX PG (FULL): 47.6 MMHG
BH CV ECHO MEAS - AO MAX PG: 51.3 MMHG
BH CV ECHO MEAS - AO MEAN PG (FULL): 28 MMHG
BH CV ECHO MEAS - AO MEAN PG: 30 MMHG
BH CV ECHO MEAS - AO V2 MAX: 358 CM/SEC
BH CV ECHO MEAS - AO V2 MEAN: 253 CM/SEC
BH CV ECHO MEAS - AO V2 VTI: 90.5 CM
BH CV ECHO MEAS - AVA(I,A): 0.68 CM^2
BH CV ECHO MEAS - AVA(I,D): 0.68 CM^2
BH CV ECHO MEAS - AVA(V,A): 0.68 CM^2
BH CV ECHO MEAS - AVA(V,D): 0.68 CM^2
BH CV ECHO MEAS - BSA(HAYCOCK): 2 M^2
BH CV ECHO MEAS - BSA: 1.9 M^2
BH CV ECHO MEAS - BZI_BMI: 30 KILOGRAMS/M^2
BH CV ECHO MEAS - BZI_METRIC_HEIGHT: 165.1 CM
BH CV ECHO MEAS - BZI_METRIC_WEIGHT: 81.6 KG
BH CV ECHO MEAS - LV MAX PG: 3.7 MMHG
BH CV ECHO MEAS - LV MEAN PG: 2 MMHG
BH CV ECHO MEAS - LV V1 MAX: 95.8 CM/SEC
BH CV ECHO MEAS - LV V1 MEAN: 66.8 CM/SEC
BH CV ECHO MEAS - LV V1 VTI: 24.3 CM
BH CV ECHO MEAS - LVOT AREA (M): 2.5 CM^2
BH CV ECHO MEAS - LVOT AREA: 2.5 CM^2
BH CV ECHO MEAS - LVOT DIAM: 1.8 CM
BH CV ECHO MEAS - SI(LVOT): 32.7 ML/M^2
BH CV ECHO MEAS - SV(LVOT): 61.8 ML
GLUCOSE BLDC GLUCOMTR-MCNC: 141 MG/DL (ref 70–130)
MAXIMAL PREDICTED HEART RATE: 167 BPM
STRESS TARGET HR: 142 BPM

## 2019-03-06 PROCEDURE — 82962 GLUCOSE BLOOD TEST: CPT

## 2019-03-06 PROCEDURE — 0 IOPAMIDOL PER 1 ML: Performed by: INTERNAL MEDICINE

## 2019-03-06 PROCEDURE — G0378 HOSPITAL OBSERVATION PER HR: HCPCS

## 2019-03-06 PROCEDURE — 25010000002 PROTAMINE SULFATE PER 10 MG: Performed by: ANESTHESIOLOGY

## 2019-03-06 PROCEDURE — C1760 CLOSURE DEV, VASC: HCPCS | Performed by: INTERNAL MEDICINE

## 2019-03-06 PROCEDURE — 93308 TTE F-UP OR LMTD: CPT

## 2019-03-06 PROCEDURE — C1769 GUIDE WIRE: HCPCS | Performed by: INTERNAL MEDICINE

## 2019-03-06 PROCEDURE — C1894 INTRO/SHEATH, NON-LASER: HCPCS | Performed by: INTERNAL MEDICINE

## 2019-03-06 PROCEDURE — 93005 ELECTROCARDIOGRAM TRACING: CPT | Performed by: INTERNAL MEDICINE

## 2019-03-06 PROCEDURE — S0260 H&P FOR SURGERY: HCPCS | Performed by: INTERNAL MEDICINE

## 2019-03-06 PROCEDURE — 92986 REVISION OF AORTIC VALVE: CPT | Performed by: INTERNAL MEDICINE

## 2019-03-06 PROCEDURE — 93325 DOPPLER ECHO COLOR FLOW MAPG: CPT | Performed by: INTERNAL MEDICINE

## 2019-03-06 PROCEDURE — C1725 CATH, TRANSLUMIN NON-LASER: HCPCS | Performed by: INTERNAL MEDICINE

## 2019-03-06 PROCEDURE — 85347 COAGULATION TIME ACTIVATED: CPT

## 2019-03-06 PROCEDURE — 93308 TTE F-UP OR LMTD: CPT | Performed by: INTERNAL MEDICINE

## 2019-03-06 PROCEDURE — 25010000002 FENTANYL CITRATE (PF) 100 MCG/2ML SOLUTION: Performed by: ANESTHESIOLOGY

## 2019-03-06 PROCEDURE — 93321 DOPPLER ECHO F-UP/LMTD STD: CPT | Performed by: INTERNAL MEDICINE

## 2019-03-06 PROCEDURE — 25010000002 MIDAZOLAM PER 1 MG: Performed by: INTERNAL MEDICINE

## 2019-03-06 PROCEDURE — 25010000002 HYDROMORPHONE PER 4 MG: Performed by: ANESTHESIOLOGY

## 2019-03-06 PROCEDURE — 25010000002 HEPARIN (PORCINE) PER 1000 UNITS: Performed by: ANESTHESIOLOGY

## 2019-03-06 PROCEDURE — 25010000002 PROPOFOL 1000 MG/ML EMULSION: Performed by: ANESTHESIOLOGY

## 2019-03-06 PROCEDURE — 99153 MOD SED SAME PHYS/QHP EA: CPT | Performed by: INTERNAL MEDICINE

## 2019-03-06 PROCEDURE — 25010000002 PROPOFOL 10 MG/ML EMULSION: Performed by: ANESTHESIOLOGY

## 2019-03-06 PROCEDURE — 25010000002 HEPARIN (PORCINE) PER 1000 UNITS: Performed by: INTERNAL MEDICINE

## 2019-03-06 PROCEDURE — 99152 MOD SED SAME PHYS/QHP 5/>YRS: CPT | Performed by: INTERNAL MEDICINE

## 2019-03-06 PROCEDURE — 93325 DOPPLER ECHO COLOR FLOW MAPG: CPT

## 2019-03-06 PROCEDURE — 93321 DOPPLER ECHO F-UP/LMTD STD: CPT

## 2019-03-06 PROCEDURE — 25010000002 PHENYLEPHRINE PER 1 ML: Performed by: ANESTHESIOLOGY

## 2019-03-06 PROCEDURE — 25010000002 FENTANYL CITRATE (PF) 100 MCG/2ML SOLUTION: Performed by: INTERNAL MEDICINE

## 2019-03-06 RX ORDER — ONDANSETRON 4 MG/1
4 TABLET, FILM COATED ORAL EVERY 6 HOURS PRN
Status: DISCONTINUED | OUTPATIENT
Start: 2019-03-06 | End: 2019-03-07 | Stop reason: HOSPADM

## 2019-03-06 RX ORDER — GABAPENTIN 300 MG/1
600 CAPSULE ORAL EVERY 8 HOURS SCHEDULED
Status: DISCONTINUED | OUTPATIENT
Start: 2019-03-06 | End: 2019-03-07 | Stop reason: HOSPADM

## 2019-03-06 RX ORDER — NALOXONE HCL 0.4 MG/ML
0.4 VIAL (ML) INJECTION
Status: DISCONTINUED | OUTPATIENT
Start: 2019-03-06 | End: 2019-03-07 | Stop reason: HOSPADM

## 2019-03-06 RX ORDER — LIDOCAINE HYDROCHLORIDE 20 MG/ML
INJECTION, SOLUTION INFILTRATION; PERINEURAL AS NEEDED
Status: DISCONTINUED | OUTPATIENT
Start: 2019-03-06 | End: 2019-03-06 | Stop reason: HOSPADM

## 2019-03-06 RX ORDER — FENTANYL CITRATE 50 UG/ML
INJECTION, SOLUTION INTRAMUSCULAR; INTRAVENOUS AS NEEDED
Status: DISCONTINUED | OUTPATIENT
Start: 2019-03-06 | End: 2019-03-06 | Stop reason: SURG

## 2019-03-06 RX ORDER — HYDROMORPHONE HYDROCHLORIDE 1 MG/ML
0.25 INJECTION, SOLUTION INTRAMUSCULAR; INTRAVENOUS; SUBCUTANEOUS
Status: DISCONTINUED | OUTPATIENT
Start: 2019-03-06 | End: 2019-03-06 | Stop reason: HOSPADM

## 2019-03-06 RX ORDER — MIDAZOLAM HYDROCHLORIDE 1 MG/ML
1 INJECTION INTRAMUSCULAR; INTRAVENOUS ONCE
Status: COMPLETED | OUTPATIENT
Start: 2019-03-06 | End: 2019-03-06

## 2019-03-06 RX ORDER — ROCURONIUM BROMIDE 10 MG/ML
INJECTION, SOLUTION INTRAVENOUS AS NEEDED
Status: DISCONTINUED | OUTPATIENT
Start: 2019-03-06 | End: 2019-03-06 | Stop reason: SURG

## 2019-03-06 RX ORDER — MIDAZOLAM HYDROCHLORIDE 1 MG/ML
INJECTION INTRAMUSCULAR; INTRAVENOUS AS NEEDED
Status: DISCONTINUED | OUTPATIENT
Start: 2019-03-06 | End: 2019-03-06 | Stop reason: HOSPADM

## 2019-03-06 RX ORDER — TRAMADOL HYDROCHLORIDE 50 MG/1
50 TABLET ORAL 2 TIMES DAILY PRN
Status: DISCONTINUED | OUTPATIENT
Start: 2019-03-06 | End: 2019-03-07 | Stop reason: HOSPADM

## 2019-03-06 RX ORDER — OXYCODONE HYDROCHLORIDE AND ACETAMINOPHEN 5; 325 MG/1; MG/1
1 TABLET ORAL ONCE AS NEEDED
Status: DISCONTINUED | OUTPATIENT
Start: 2019-03-06 | End: 2019-03-06 | Stop reason: HOSPADM

## 2019-03-06 RX ORDER — PROTAMINE SULFATE 10 MG/ML
INJECTION, SOLUTION INTRAVENOUS AS NEEDED
Status: DISCONTINUED | OUTPATIENT
Start: 2019-03-06 | End: 2019-03-06 | Stop reason: SURG

## 2019-03-06 RX ORDER — ONDANSETRON 2 MG/ML
4 INJECTION INTRAMUSCULAR; INTRAVENOUS ONCE AS NEEDED
Status: DISCONTINUED | OUTPATIENT
Start: 2019-03-06 | End: 2019-03-06 | Stop reason: HOSPADM

## 2019-03-06 RX ORDER — TRAMADOL HYDROCHLORIDE 50 MG/1
TABLET ORAL
Status: DISCONTINUED
Start: 2019-03-06 | End: 2019-03-06 | Stop reason: WASHOUT

## 2019-03-06 RX ORDER — HEPARIN SODIUM 1000 [USP'U]/ML
INJECTION, SOLUTION INTRAVENOUS; SUBCUTANEOUS AS NEEDED
Status: DISCONTINUED | OUTPATIENT
Start: 2019-03-06 | End: 2019-03-06 | Stop reason: SURG

## 2019-03-06 RX ORDER — FENTANYL CITRATE 50 UG/ML
25 INJECTION, SOLUTION INTRAMUSCULAR; INTRAVENOUS
Status: DISCONTINUED | OUTPATIENT
Start: 2019-03-06 | End: 2019-03-06 | Stop reason: HOSPADM

## 2019-03-06 RX ORDER — HYDROCODONE BITARTRATE AND ACETAMINOPHEN 5; 325 MG/1; MG/1
1 TABLET ORAL ONCE AS NEEDED
Status: DISCONTINUED | OUTPATIENT
Start: 2019-03-06 | End: 2019-03-06 | Stop reason: HOSPADM

## 2019-03-06 RX ORDER — TEMAZEPAM 15 MG/1
15 CAPSULE ORAL NIGHTLY PRN
Status: DISCONTINUED | OUTPATIENT
Start: 2019-03-06 | End: 2019-03-07 | Stop reason: HOSPADM

## 2019-03-06 RX ORDER — SODIUM CHLORIDE 0.9 % (FLUSH) 0.9 %
3-10 SYRINGE (ML) INJECTION AS NEEDED
Status: DISCONTINUED | OUTPATIENT
Start: 2019-03-06 | End: 2019-03-06

## 2019-03-06 RX ORDER — HYDROCODONE BITARTRATE AND ACETAMINOPHEN 5; 325 MG/1; MG/1
1 TABLET ORAL EVERY 4 HOURS PRN
Status: DISCONTINUED | OUTPATIENT
Start: 2019-03-06 | End: 2019-03-07 | Stop reason: HOSPADM

## 2019-03-06 RX ORDER — SODIUM CHLORIDE 9 MG/ML
75 INJECTION, SOLUTION INTRAVENOUS CONTINUOUS
Status: DISCONTINUED | OUTPATIENT
Start: 2019-03-06 | End: 2019-03-07 | Stop reason: HOSPADM

## 2019-03-06 RX ORDER — MORPHINE SULFATE 2 MG/ML
2 INJECTION, SOLUTION INTRAMUSCULAR; INTRAVENOUS
Status: DISCONTINUED | OUTPATIENT
Start: 2019-03-06 | End: 2019-03-07 | Stop reason: HOSPADM

## 2019-03-06 RX ORDER — PROPOFOL 10 MG/ML
VIAL (ML) INTRAVENOUS AS NEEDED
Status: DISCONTINUED | OUTPATIENT
Start: 2019-03-06 | End: 2019-03-06 | Stop reason: SURG

## 2019-03-06 RX ORDER — DIPHENHYDRAMINE HYDROCHLORIDE 50 MG/ML
6.25 INJECTION INTRAMUSCULAR; INTRAVENOUS
Status: DISCONTINUED | OUTPATIENT
Start: 2019-03-06 | End: 2019-03-06 | Stop reason: HOSPADM

## 2019-03-06 RX ORDER — FENTANYL CITRATE 50 UG/ML
INJECTION, SOLUTION INTRAMUSCULAR; INTRAVENOUS AS NEEDED
Status: DISCONTINUED | OUTPATIENT
Start: 2019-03-06 | End: 2019-03-06 | Stop reason: HOSPADM

## 2019-03-06 RX ORDER — SODIUM CHLORIDE 9 MG/ML
50 INJECTION, SOLUTION INTRAVENOUS CONTINUOUS
Status: ACTIVE | OUTPATIENT
Start: 2019-03-06 | End: 2019-03-07

## 2019-03-06 RX ORDER — ALPRAZOLAM 0.25 MG/1
0.25 TABLET ORAL 2 TIMES DAILY PRN
Status: DISCONTINUED | OUTPATIENT
Start: 2019-03-06 | End: 2019-03-07 | Stop reason: HOSPADM

## 2019-03-06 RX ORDER — HYDRALAZINE HYDROCHLORIDE 20 MG/ML
5 INJECTION INTRAMUSCULAR; INTRAVENOUS
Status: DISCONTINUED | OUTPATIENT
Start: 2019-03-06 | End: 2019-03-06 | Stop reason: HOSPADM

## 2019-03-06 RX ORDER — ONDANSETRON 4 MG/1
4 TABLET, ORALLY DISINTEGRATING ORAL EVERY 6 HOURS PRN
Status: DISCONTINUED | OUTPATIENT
Start: 2019-03-06 | End: 2019-03-07 | Stop reason: HOSPADM

## 2019-03-06 RX ORDER — FLUMAZENIL 0.1 MG/ML
0.2 INJECTION INTRAVENOUS AS NEEDED
Status: DISCONTINUED | OUTPATIENT
Start: 2019-03-06 | End: 2019-03-06 | Stop reason: HOSPADM

## 2019-03-06 RX ORDER — EPHEDRINE SULFATE 50 MG/ML
5 INJECTION, SOLUTION INTRAVENOUS ONCE AS NEEDED
Status: DISCONTINUED | OUTPATIENT
Start: 2019-03-06 | End: 2019-03-06 | Stop reason: HOSPADM

## 2019-03-06 RX ORDER — LIDOCAINE HYDROCHLORIDE 10 MG/ML
0.1 INJECTION, SOLUTION EPIDURAL; INFILTRATION; INTRACAUDAL; PERINEURAL ONCE AS NEEDED
Status: DISCONTINUED | OUTPATIENT
Start: 2019-03-06 | End: 2019-03-06

## 2019-03-06 RX ORDER — MIDAZOLAM HYDROCHLORIDE 1 MG/ML
INJECTION INTRAMUSCULAR; INTRAVENOUS
Status: DISPENSED
Start: 2019-03-06 | End: 2019-03-07

## 2019-03-06 RX ORDER — ONDANSETRON 2 MG/ML
4 INJECTION INTRAMUSCULAR; INTRAVENOUS EVERY 6 HOURS PRN
Status: DISCONTINUED | OUTPATIENT
Start: 2019-03-06 | End: 2019-03-07 | Stop reason: HOSPADM

## 2019-03-06 RX ORDER — NALOXONE HCL 0.4 MG/ML
0.4 VIAL (ML) INJECTION AS NEEDED
Status: DISCONTINUED | OUTPATIENT
Start: 2019-03-06 | End: 2019-03-06 | Stop reason: HOSPADM

## 2019-03-06 RX ORDER — SODIUM CHLORIDE 0.9 % (FLUSH) 0.9 %
3 SYRINGE (ML) INJECTION EVERY 12 HOURS SCHEDULED
Status: DISCONTINUED | OUTPATIENT
Start: 2019-03-06 | End: 2019-03-06

## 2019-03-06 RX ORDER — HYDROMORPHONE HYDROCHLORIDE 1 MG/ML
0.5 INJECTION, SOLUTION INTRAMUSCULAR; INTRAVENOUS; SUBCUTANEOUS
Status: DISCONTINUED | OUTPATIENT
Start: 2019-03-06 | End: 2019-03-06 | Stop reason: HOSPADM

## 2019-03-06 RX ORDER — ACETAMINOPHEN 325 MG/1
650 TABLET ORAL EVERY 4 HOURS PRN
Status: DISCONTINUED | OUTPATIENT
Start: 2019-03-06 | End: 2019-03-07 | Stop reason: HOSPADM

## 2019-03-06 RX ADMIN — ROCURONIUM BROMIDE 10 MG: 10 INJECTION INTRAVENOUS at 12:36

## 2019-03-06 RX ADMIN — HYDROMORPHONE HYDROCHLORIDE 0.25 MG: 1 INJECTION, SOLUTION INTRAMUSCULAR; INTRAVENOUS; SUBCUTANEOUS at 13:59

## 2019-03-06 RX ADMIN — PHENYLEPHRINE HYDROCHLORIDE 100 MCG: 10 INJECTION INTRAVENOUS at 12:33

## 2019-03-06 RX ADMIN — FENTANYL CITRATE 25 MCG: 50 INJECTION INTRAMUSCULAR; INTRAVENOUS at 13:54

## 2019-03-06 RX ADMIN — SUGAMMADEX 200 MG: 100 INJECTION, SOLUTION INTRAVENOUS at 13:02

## 2019-03-06 RX ADMIN — ALPRAZOLAM 0.25 MG: 0.25 TABLET ORAL at 21:37

## 2019-03-06 RX ADMIN — MIDAZOLAM 1 MG: 1 INJECTION INTRAMUSCULAR; INTRAVENOUS at 13:56

## 2019-03-06 RX ADMIN — SODIUM CHLORIDE 75 ML/HR: 9 INJECTION, SOLUTION INTRAVENOUS at 10:24

## 2019-03-06 RX ADMIN — FENTANYL CITRATE 50 MCG: 50 INJECTION INTRAMUSCULAR; INTRAVENOUS at 13:33

## 2019-03-06 RX ADMIN — FENTANYL CITRATE 25 MCG: 50 INJECTION INTRAMUSCULAR; INTRAVENOUS at 12:00

## 2019-03-06 RX ADMIN — FENTANYL CITRATE 25 MCG: 50 INJECTION INTRAMUSCULAR; INTRAVENOUS at 13:39

## 2019-03-06 RX ADMIN — GABAPENTIN 600 MG: 300 CAPSULE ORAL at 21:37

## 2019-03-06 RX ADMIN — PHENYLEPHRINE HYDROCHLORIDE 200 MCG: 10 INJECTION INTRAVENOUS at 12:43

## 2019-03-06 RX ADMIN — PHENYLEPHRINE HYDROCHLORIDE 200 MCG: 10 INJECTION INTRAVENOUS at 11:45

## 2019-03-06 RX ADMIN — HYDROMORPHONE HYDROCHLORIDE 0.25 MG: 1 INJECTION, SOLUTION INTRAMUSCULAR; INTRAVENOUS; SUBCUTANEOUS at 14:12

## 2019-03-06 RX ADMIN — GABAPENTIN 600 MG: 300 CAPSULE ORAL at 18:37

## 2019-03-06 RX ADMIN — ROCURONIUM BROMIDE 40 MG: 10 INJECTION INTRAVENOUS at 11:45

## 2019-03-06 RX ADMIN — PROPOFOL 50 MG: 10 INJECTION, EMULSION INTRAVENOUS at 11:30

## 2019-03-06 RX ADMIN — PROPOFOL 100 MG: 10 INJECTION, EMULSION INTRAVENOUS at 11:45

## 2019-03-06 RX ADMIN — PROTAMINE SULFATE 30 MG: 10 INJECTION, SOLUTION INTRAVENOUS at 12:53

## 2019-03-06 RX ADMIN — DEXMEDETOMIDINE HYDROCHLORIDE 1 MCG/KG/HR: 100 INJECTION, SOLUTION, CONCENTRATE INTRAVENOUS at 11:27

## 2019-03-06 RX ADMIN — PHENYLEPHRINE HYDROCHLORIDE 100 MCG: 10 INJECTION INTRAVENOUS at 11:51

## 2019-03-06 RX ADMIN — HYDROCODONE BITARTRATE AND ACETAMINOPHEN 1 TABLET: 5; 325 TABLET ORAL at 18:37

## 2019-03-06 RX ADMIN — HEPARIN SODIUM 8000 UNITS: 1000 INJECTION, SOLUTION INTRAVENOUS; SUBCUTANEOUS at 12:28

## 2019-03-06 RX ADMIN — PHENYLEPHRINE HYDROCHLORIDE 100 MCG: 10 INJECTION INTRAVENOUS at 12:17

## 2019-03-06 RX ADMIN — SODIUM CHLORIDE 50 ML/HR: 9 INJECTION, SOLUTION INTRAVENOUS at 22:24

## 2019-03-06 RX ADMIN — FENTANYL CITRATE 50 MCG: 50 INJECTION INTRAMUSCULAR; INTRAVENOUS at 12:54

## 2019-03-06 RX ADMIN — FENTANYL CITRATE 25 MCG: 50 INJECTION INTRAMUSCULAR; INTRAVENOUS at 12:37

## 2019-03-06 RX ADMIN — HYDROMORPHONE HYDROCHLORIDE 0.5 MG: 10 INJECTION INTRAMUSCULAR; INTRAVENOUS; SUBCUTANEOUS at 13:33

## 2019-03-06 RX ADMIN — PROPOFOL 50 MCG/KG/MIN: 10 INJECTION, EMULSION INTRAVENOUS at 11:29

## 2019-03-06 RX ADMIN — PHENYLEPHRINE HYDROCHLORIDE 200 MCG: 10 INJECTION INTRAVENOUS at 12:12

## 2019-03-06 RX ADMIN — FENTANYL CITRATE 25 MCG: 50 INJECTION INTRAMUSCULAR; INTRAVENOUS at 14:06

## 2019-03-06 RX ADMIN — SODIUM CHLORIDE: 9 INJECTION, SOLUTION INTRAVENOUS at 13:08

## 2019-03-06 RX ADMIN — PHENYLEPHRINE HYDROCHLORIDE 100 MCG: 10 INJECTION INTRAVENOUS at 11:36

## 2019-03-06 RX ADMIN — FENTANYL CITRATE 25 MCG: 50 INJECTION INTRAMUSCULAR; INTRAVENOUS at 13:44

## 2019-03-06 RX ADMIN — FENTANYL CITRATE 50 MCG: 50 INJECTION INTRAMUSCULAR; INTRAVENOUS at 13:30

## 2019-03-06 RX ADMIN — PHENYLEPHRINE HYDROCHLORIDE 200 MCG: 10 INJECTION INTRAVENOUS at 12:27

## 2019-03-06 RX ADMIN — ACETAMINOPHEN 650 MG: 325 TABLET, FILM COATED ORAL at 21:37

## 2019-03-06 NOTE — ANESTHESIA POSTPROCEDURE EVALUATION
"Patient: Isha Llanes    Procedure Summary     Date:  03/06/19 Room / Location:  [facility] / [facility]    Anesthesia Start:  1125 Anesthesia Stop:  1334    Procedure:  Valvuloplasty (N/A ) Diagnosis:  Nonrheumatic aortic valve stenosis    Provider:  Wayne Johnson MD Provider:  Kevin Renee MD    Anesthesia Type:  MAC ASA Status:  4          Anesthesia Type: MAC  Last vitals  BP   132/87 (03/06/19 0957)   Temp   36.6 °C (97.9 °F) (03/06/19 0957)   Pulse   87 (03/06/19 0957)   Resp   16 (03/06/19 1121)     SpO2   96 % (03/06/19 0957)     Post Anesthesia Care and Evaluation    Patient location during evaluation: bedside  Patient participation: complete - patient participated  Level of consciousness: awake  Pain management: inadequate (pt with severe chronic pain at baseline)  Airway patency: patent  Anesthetic complications: No anesthetic complications  PONV Status: none  Cardiovascular status: acceptable  Respiratory status: acceptable  Hydration status: acceptable    Comments: */87 (BP Location: Left arm)   Pulse 87   Temp 36.6 °C (97.9 °F) (Temporal)   Resp 16   Ht 165.1 cm (65\")   Wt 81.6 kg (180 lb)   SpO2 96%   BMI 29.95 kg/m²         "

## 2019-03-06 NOTE — H&P
Referring Provider:       Patient Care Team:  Lety Schulz MD as PCP - General (Internal Medicine)  Jermaine Iraheta MD (Hematology and Oncology)      Reason for Consultation: severe AS    History of present illness:    53-year-old female to medical history of prior pulmonary embolus, protein C and S deficiency, Hodgkin's lymphoma with radiation, spinal and sacroiliac disease with immobility, who is wheelchair-bound.  Patient has a intrathecal pain pump as well.  The patient has multiple complaints. Her complaints are bilateral lower extremity edema along with symptoms that sound to be presyncope or syncope. These occur when she is sitting her wheelchair. She does not do anything for activity. She does report mild orthopnea as well. She had and evaluation at [facility] which I have reviewed. Her echocardiogram reveals normal left ventricular size and systolic function. The valve does not appear to be severely calcified and looks like it opens reasonably well, however, the gradients across it are around 40 to 45 mmHg. There is mild aortic valve regurgitation also documented. It looks to be tricuspid on my review. She also had coronary angiography without left ventricular angiography or left heart catheterization. She had no significant coronary artery disease. She presents here for evaluation.      Review of Systems  All other systems reviewed and negative.     Past Medical History:   Past Medical History:   Diagnosis Date   • Anxiety    • Anxiety    • Aortic stenosis, severe    • Arthritis    • Arthritis    • Back pain    • Blood clotting disorder (CMS/HCC)    • Cancer associated pain    • Chronic low back pain with sciatica    • Dyspnea on effort    • Hodgkin's lymphoma (CMS/HCC)    • Immobility    • Low back pain    • Lupus    • Mid back pain    • Other pulmonary embolism without acute cor pulmonale (CMS/HCC)    • Pelvic pain    • Peripheral neuropathy    • Presence of intrathecal pump    •  Sacroiliac joint disease    • SI (sacroiliac) joint inflammation (CMS/HCC)        Past Surgical History:   Past Surgical History:   Procedure Laterality Date   • BACK SURGERY     • BLADDER REPAIR     • CHOLECYSTECTOMY     • HYSTERECTOMY     • LYMPHADENECTOMY     • TONSILLECTOMY     • TUBAL ABDOMINAL LIGATION     • TUMOR REMOVAL         Family History:   Family History   Problem Relation Age of Onset   • Pancreatic cancer Sister    • Pancreatic cancer Paternal Grandmother        Social History:   Social History     Tobacco Use   • Smoking status: Current Every Day Smoker     Packs/day: 2.00     Years: 41.00     Pack years: 82.00     Types: Cigarettes   • Smokeless tobacco: Never Used   Substance Use Topics   • Alcohol use: No   • Drug use: No       Home Medications:   Medications Prior to Admission   Medication Sig Dispense Refill Last Dose   • acetaminophen (TYLENOL) 325 MG tablet Take 325 mg by mouth As Needed for Mild Pain (1-3).   3/5/2019 at Unknown time   • gabapentin (NEURONTIN) 300 MG capsule Take 600 mg by mouth 3 (Three) Times a Day.   3/5/2019 at Unknown time   • HYDROmorphone (DILAUDID) 1 mg/mL solution Infuse  into a venous catheter Dose Adjusted By Provider As Needed.   3/6/2019 at Unknown time   • albuterol (PROVENTIL HFA;VENTOLIN HFA) 108 (90 Base) MCG/ACT inhaler Inhale 2 puffs Every 4 (Four) Hours As Needed for Wheezing.   Unknown at Unknown time   • ALPRAZolam (XANAX) 0.25 MG tablet Take 0.25 mg by mouth 2 (Two) Times a Day As Needed for Anxiety.   3/4/2019   • furosemide (LASIX) 40 MG tablet Take 40 mg by mouth Daily.   3/4/2019   • SITagliptin (JANUVIA) 100 MG tablet Take 100 mg by mouth Daily.   3/4/2019   • traMADol (ULTRAM) 50 MG tablet Take 50 mg by mouth 2 (Two) Times a Day As Needed for Moderate Pain .   3/5/2019       Current Medications:   Current Facility-Administered Medications:   •  lidocaine PF 1% (XYLOCAINE) injection 0.1 mL, 0.1 mL, Intradermal, Once PRN, Wayne Johnson  "MD  •  sodium chloride 0.9 % flush 3 mL, 3 mL, Intravenous, Q12H, Wayne Johnson MD  •  sodium chloride 0.9 % flush 3-10 mL, 3-10 mL, Intravenous, PRN, Wayne Johnson MD  •  sodium chloride 0.9 % infusion, 75 mL/hr, Intravenous, Continuous, Wayne Johnson MD     Allergies: Amoxicillin; Ceclor [cefaclor]; Compazine [prochlorperazine edisylate]; Contrast dye; Penicillins; Phenergan [promethazine hcl]; and Sulfa antibiotics      Vital Signs   Temp:  [97.9 °F (36.6 °C)] 97.9 °F (36.6 °C)  Heart Rate:  [87] 87  Resp:  [20] 20  BP: (132)/(87) 132/87  Flowsheet Rows      First Filed Value   Admission Height  165.1 cm (65\") Documented at 03/06/2019 0957   Admission Weight  81.6 kg (180 lb) Documented at 03/06/2019 0957          General Appearance:    Alert, cooperative, in no acute distress   Head:    Normocephalic, without obvious abnormality, atraumatic       Neck:   No adenopathy, supple, no thyromegaly, no carotid bruit, no    JVD   Lungs:     Clear to auscultation bilaterally, no wheezes, rales, or     rhonchi    Heart:    Normal rate, regular rhythm,  III/VI systolic murmur LUSB   Chest Wall:    No abnormalities observed   Abdomen:     Normal bowel sounds, soft, non-tender, non-distended,            no rebound tenderness   Extremities:   No cyanosis, clubbing, or edema   Pulses:   Pulses palpable and equal bilaterally   Skin:   No bleeding or rash   Lymph nodes:   No cervical adenopathy   Neurologic:   Cranial nerves 2 - 12 grossly intact, sensation intact               Results Review: I personally viewed and interpreted the patient's EKG/Telemetry data            No results found for: TROPONINT        Assessment/Plan   Aortic stenosis  Valvuloplasty today    I discussed the patients findings and my recommendations with the patient            "

## 2019-03-06 NOTE — PLAN OF CARE
Problem: Patient Care Overview  Goal: Plan of Care Review  Outcome: Ongoing (interventions implemented as appropriate)   03/06/19 5256   Coping/Psychosocial   Plan of Care Reviewed With patient   Plan of Care Review   Progress improving   OTHER   Outcome Summary Pt VSS. Complaints of chronic pain, RT groin site CDI/soft. 2L NC. Feels better. Pt straightening legs.

## 2019-03-07 VITALS
HEART RATE: 100 BPM | BODY MASS INDEX: 29.99 KG/M2 | OXYGEN SATURATION: 92 % | TEMPERATURE: 97.8 F | RESPIRATION RATE: 18 BRPM | WEIGHT: 180 LBS | DIASTOLIC BLOOD PRESSURE: 84 MMHG | HEIGHT: 65 IN | SYSTOLIC BLOOD PRESSURE: 127 MMHG

## 2019-03-07 LAB
ACT BLD: 125 SECONDS (ref 82–152)
ANION GAP SERPL CALCULATED.3IONS-SCNC: 11.3 MMOL/L
BUN BLD-MCNC: 11 MG/DL (ref 6–20)
BUN/CREAT SERPL: 17.7 (ref 7–25)
CALCIUM SPEC-SCNC: 9 MG/DL (ref 8.6–10.5)
CHLORIDE SERPL-SCNC: 100 MMOL/L (ref 98–107)
CO2 SERPL-SCNC: 24.7 MMOL/L (ref 22–29)
CREAT BLD-MCNC: 0.62 MG/DL (ref 0.57–1)
DEPRECATED RDW RBC AUTO: 48.2 FL (ref 37–54)
ERYTHROCYTE [DISTWIDTH] IN BLOOD BY AUTOMATED COUNT: 15.1 % (ref 12.3–15.4)
GFR SERPL CREATININE-BSD FRML MDRD: 101 ML/MIN/1.73
GLUCOSE BLD-MCNC: 208 MG/DL (ref 65–99)
HCT VFR BLD AUTO: 39.6 % (ref 34–46.6)
HGB BLD-MCNC: 11.8 G/DL (ref 12–15.9)
MCH RBC QN AUTO: 26.3 PG (ref 26.6–33)
MCHC RBC AUTO-ENTMCNC: 29.8 G/DL (ref 31.5–35.7)
MCV RBC AUTO: 88.2 FL (ref 79–97)
PLATELET # BLD AUTO: 153 10*3/MM3 (ref 140–450)
PMV BLD AUTO: 10.1 FL (ref 6–12)
POTASSIUM BLD-SCNC: 4.1 MMOL/L (ref 3.5–5.2)
RBC # BLD AUTO: 4.49 10*6/MM3 (ref 3.77–5.28)
SODIUM BLD-SCNC: 136 MMOL/L (ref 136–145)
WBC NRBC COR # BLD: 6.78 10*3/MM3 (ref 3.4–10.8)

## 2019-03-07 PROCEDURE — 85027 COMPLETE CBC AUTOMATED: CPT | Performed by: INTERNAL MEDICINE

## 2019-03-07 PROCEDURE — G0378 HOSPITAL OBSERVATION PER HR: HCPCS

## 2019-03-07 PROCEDURE — 80048 BASIC METABOLIC PNL TOTAL CA: CPT | Performed by: INTERNAL MEDICINE

## 2019-03-07 PROCEDURE — 99217 PR OBSERVATION CARE DISCHARGE MANAGEMENT: CPT | Performed by: INTERNAL MEDICINE

## 2019-03-07 RX ADMIN — GABAPENTIN 600 MG: 300 CAPSULE ORAL at 06:36

## 2019-03-07 RX ADMIN — HYDROCODONE BITARTRATE AND ACETAMINOPHEN 1 TABLET: 5; 325 TABLET ORAL at 08:57

## 2019-03-07 NOTE — ADDENDUM NOTE
Addendum  created 03/06/19 2038 by Jovany Escobedo MD    Review and Sign - Ready for Procedure, Review and Sign - Signed

## 2019-03-07 NOTE — DISCHARGE SUMMARY
"  Date of Discharge:  3/7/2019  Date of Admit: 3/6/2019    Discharge Diagnosis:  Nonrheumatic aortic valve stenosis  Protein C and protein S deficiency  Hodgkin's lymphoma with radiation  Sacroiliac disease and immobility    Hospital Course: 53-year-old female with medical history as documented above who presented for palliative balloon aortic valvuloplasty.  Secondary to her multiple medical problems and evaluation by our cardiac surgery team she was not felt to be an appropriate transcatheter aortic valve replacement candidate at this time.  She is predominately wheelchair-bound and really unable to do much of anything.  She underwent balloon aortic valvuloplasty with general anesthesia as she was unable to straighten her legs or maintain a supine position secondary to severe lumbosacral pain.  Her valvuloplasty was successful with a decrease in the gradient from 40 mmHg to 28 mmHg.  She tolerated this well.  She had no complications.  She is appropriate for discharge home today    Procedures Performed  Procedure(s):  Valvuloplasty     3/6/19  Conclusions:   1.  Severe aortic valve stenosis  2.  Balloon aortic valvuloplasty with decrease in mean gradient 48 mmHg to 28 mmHg.      Consults     No orders found for last 30 day(s).          Pertinent Test Results:      Discharge Physical Exam:  Temp:  [97.8 °F (36.6 °C)-98.3 °F (36.8 °C)] 97.8 °F (36.6 °C)  Heart Rate:  [] 100  Resp:  [16-20] 18  BP: ()/() 127/84    Intake/Output Summary (Last 24 hours) at 3/7/2019 0948  Last data filed at 3/7/2019 0802  Gross per 24 hour   Intake 740 ml   Output 450 ml   Net 290 ml     Flowsheet Rows      First Filed Value   Admission Height  165.1 cm (65\") Documented at 03/06/2019 0957   Admission Weight  81.6 kg (180 lb) Documented at 03/06/2019 0957          General Appearance:    Alert, cooperative, in no acute distress   Head:    Normocephalic, without obvious abnormality, atraumatic       Neck:   No adenopathy, " supple, no thyromegaly, no carotid bruit, no    JVD   Lungs:     Clear to auscultation bilaterally, no wheezes, rales, or     rhonchi    Heart:    Normal rate, regular rhythm, II/VI systolic murmur peak early, rub, or gallop   Chest Wall:    No abnormalities observed   Abdomen:     Normal bowel sounds, soft, non-tender, non-distended,            no rebound tenderness   Extremities:   No cyanosis, clubbing, or edema   Pulses:   Pulses palpable and equal bilaterally   Skin:   No bleeding or rash       Neurologic:   Cranial nerves 2 - 12 grossly intact, sensation intact             Discharge Medications     Discharge Medications      Continue These Medications      Instructions Start Date   acetaminophen 325 MG tablet  Commonly known as:  TYLENOL   325 mg, Oral, As Needed      albuterol sulfate  (90 Base) MCG/ACT inhaler  Commonly known as:  PROVENTIL HFA;VENTOLIN HFA;PROAIR HFA   2 puffs, Inhalation, Every 4 Hours PRN      ALPRAZolam 0.25 MG tablet  Commonly known as:  XANAX   0.25 mg, Oral, 2 Times Daily PRN      furosemide 40 MG tablet  Commonly known as:  LASIX   40 mg, Oral, Daily      gabapentin 300 MG capsule  Commonly known as:  NEURONTIN   600 mg, Oral, 3 Times Daily      HYDROmorphone 1 mg/mL solution  Commonly known as:  DILAUDID   Intravenous, Titrated      SITagliptin 100 MG tablet  Commonly known as:  JANUVIA   100 mg, Oral, Daily      traMADol 50 MG tablet  Commonly known as:  ULTRAM   50 mg, Oral, 2 Times Daily PRN             Discharge Diet: cardiac, carb controlled    Activity at Discharge: ad giovanna. No lifting greater than 10 lb first week    Discharge disposition:  Home    Condition on Discharge: fair    Follow-up Appointments  Future Appointments   Date Time Provider Department Center   3/20/2019  2:00 PM Horace Rios MD [facility] None     Additional Instructions for the Follow-ups that You Need to Schedule     Discharge Follow-up with Specialty: julio; 1 Month   As directed       Specialty:  julio    Follow Up:  1 Month               Test Results Pending at Discharge       Wayne Johnson MD  03/07/19  9:48 AM        Dictated utilizing Dragon dictation

## 2019-03-07 NOTE — NURSING NOTE
Patient needs to be a stand by assist with bed alarm at all times. She has fallen asleep on the toilet multiple times tonight hunched over with head below knees. I have kept 3rd bed rail up on patients lower Rt. Side d/t leg coming out of bed and patient falling asleep and laying over causing bed alarm to go off and almost falling out of the bed. Patient gets very angry when she is woken up after falling asleep and almost falling. Will con't to monitor. Patient states that she does not have any medication with her. Educated on the importance of falls prevention and the need of a stand by assist.

## 2019-03-20 ENCOUNTER — OFFICE VISIT (OUTPATIENT)
Dept: PAIN MEDICINE | Facility: CLINIC | Age: 54
End: 2019-03-20

## 2019-03-20 VITALS
TEMPERATURE: 97.8 F | BODY MASS INDEX: 29.95 KG/M2 | DIASTOLIC BLOOD PRESSURE: 76 MMHG | SYSTOLIC BLOOD PRESSURE: 111 MMHG | OXYGEN SATURATION: 95 % | HEART RATE: 104 BPM | HEIGHT: 65 IN

## 2019-03-20 DIAGNOSIS — G89.29 CHRONIC MIDLINE LOW BACK PAIN WITH BILATERAL SCIATICA: ICD-10-CM

## 2019-03-20 DIAGNOSIS — M54.42 CHRONIC MIDLINE LOW BACK PAIN WITH BILATERAL SCIATICA: ICD-10-CM

## 2019-03-20 DIAGNOSIS — Z97.8 PRESENCE OF INTRATHECAL PUMP: ICD-10-CM

## 2019-03-20 DIAGNOSIS — G89.4 CHRONIC PAIN SYNDROME: Primary | ICD-10-CM

## 2019-03-20 DIAGNOSIS — M54.41 CHRONIC MIDLINE LOW BACK PAIN WITH BILATERAL SCIATICA: ICD-10-CM

## 2019-03-20 LAB
POC AMPHETAMINES: NEGATIVE
POC BARBITURATES: NEGATIVE
POC BENZODIAZEPHINES: NEGATIVE
POC COCAINE: NEGATIVE
POC METHADONE: NEGATIVE
POC METHAMPHETAMINE SCREEN URINE: NEGATIVE
POC OPIATES: POSITIVE
POC OXYCODONE: POSITIVE
POC PHENCYCLIDINE: NEGATIVE
POC PROPOXYPHENE: NEGATIVE
POC THC: NEGATIVE
POC TRICYCLIC ANTIDEPRESSANTS: NEGATIVE

## 2019-03-20 PROCEDURE — 99213 OFFICE O/P EST LOW 20 MIN: CPT | Performed by: ANESTHESIOLOGY

## 2019-03-20 PROCEDURE — 80305 DRUG TEST PRSMV DIR OPT OBS: CPT | Performed by: ANESTHESIOLOGY

## 2019-03-20 PROCEDURE — 62369 ANAL SP INF PMP W/REPRG&FILL: CPT | Performed by: ANESTHESIOLOGY

## 2019-03-20 RX ORDER — HYDROCODONE BITARTRATE AND ACETAMINOPHEN 10; 325 MG/1; MG/1
1 TABLET ORAL EVERY 6 HOURS PRN
Qty: 120 TABLET | Refills: 0 | Status: SHIPPED | OUTPATIENT
Start: 2019-03-20 | End: 2019-06-12

## 2019-03-20 NOTE — PROGRESS NOTES
CHIEF COMPLAINT  F/U back pain- since last visit patient states that her back has worsened.     Subjective   Isha Llanes is a 53 y.o. female  who presents to the office for follow-up.She has a history of cancer in the past, cardiac conditions, and spinal deformity..      Pain   This is a chronic problem. The current episode started more than 1 year ago. The problem occurs constantly. The problem has been gradually worsening. Associated symptoms include arthralgias, neck pain, numbness (legs and feet bilaterally) and weakness (legs bilat.). Pertinent negatives include no abdominal pain, chest pain, chills, fever, headaches, nausea or vomiting. Nothing aggravates the symptoms. The treatment provided moderate relief.   Back Pain   This is a chronic problem. The current episode started more than 1 year ago. The problem occurs constantly. The problem has been gradually worsening since onset. The pain is present in the lumbar spine. The quality of the pain is described as aching, burning and shooting. The pain radiates to the left thigh and right thigh. The pain is severe. The symptoms are aggravated by bending, lying down, position, sitting, standing and twisting. Stiffness is present in the morning, at night and all day. Associated symptoms include numbness (legs and feet bilaterally), pelvic pain (to see oncologist) and weakness (legs bilat.). Pertinent negatives include no abdominal pain, chest pain, dysuria, fever or headaches. Risk factors include poor posture and sedentary lifestyle. She has tried analgesics for the symptoms. The treatment provided moderate relief.        PEG Assessment   What number best describes your pain on average in the past week?9  What number best describes how, during the past week, pain has interfered with your enjoyment of life?9  What number best describes how, during the past week, pain has interfered with your general activity?  9    --  The aforementioned information the  Chief Complaint section and above subjective data including any HPI data has been personally reviewed and affirmed.  --        The following portions of the patient's history were reviewed and updated as appropriate: allergies, current medications, past family history, past medical history, past social history, past surgical history and problem list.    -------    The following portions of the patient's history were reviewed and updated as appropriate: allergies, current medications, past family history, past medical history, past social history, past surgical history and problem list.    Allergies   Allergen Reactions   • Amoxicillin    • Ceclor [Cefaclor]    • Compazine [Prochlorperazine Edisylate]    • Contrast Dye    • Penicillins    • Phenergan [Promethazine Hcl]    • Sulfa Antibiotics          Current Outpatient Medications:   •  acetaminophen (TYLENOL) 325 MG tablet, Take 325 mg by mouth As Needed for Mild Pain (1-3)., Disp: , Rfl:   •  albuterol (PROVENTIL HFA;VENTOLIN HFA) 108 (90 Base) MCG/ACT inhaler, Inhale 2 puffs Every 4 (Four) Hours As Needed for Wheezing., Disp: , Rfl:   •  furosemide (LASIX) 40 MG tablet, Take 40 mg by mouth Daily., Disp: , Rfl:   •  gabapentin (NEURONTIN) 300 MG capsule, Take 600 mg by mouth 3 (Three) Times a Day., Disp: , Rfl:   •  HYDROmorphone (DILAUDID) 1 mg/mL solution, Infuse  into a venous catheter Dose Adjusted By Provider As Needed., Disp: , Rfl:   •  SITagliptin (JANUVIA) 100 MG tablet, Take 100 mg by mouth Daily., Disp: , Rfl:   •  traMADol (ULTRAM) 50 MG tablet, Take 50 mg by mouth 2 (Two) Times a Day As Needed for Moderate Pain ., Disp: , Rfl:   •  HYDROcodone-acetaminophen (NORCO)  MG per tablet, Take 1 tablet by mouth Every 6 (Six) Hours As Needed for Severe Pain ., Disp: 120 tablet, Rfl: 0    Current Outpatient Medications on File Prior to Visit   Medication Sig Dispense Refill   • acetaminophen (TYLENOL) 325 MG tablet Take 325 mg by mouth As Needed for Mild  Pain (1-3).     • albuterol (PROVENTIL HFA;VENTOLIN HFA) 108 (90 Base) MCG/ACT inhaler Inhale 2 puffs Every 4 (Four) Hours As Needed for Wheezing.     • furosemide (LASIX) 40 MG tablet Take 40 mg by mouth Daily.     • gabapentin (NEURONTIN) 300 MG capsule Take 600 mg by mouth 3 (Three) Times a Day.     • HYDROmorphone (DILAUDID) 1 mg/mL solution Infuse  into a venous catheter Dose Adjusted By Provider As Needed.     • SITagliptin (JANUVIA) 100 MG tablet Take 100 mg by mouth Daily.     • traMADol (ULTRAM) 50 MG tablet Take 50 mg by mouth 2 (Two) Times a Day As Needed for Moderate Pain .       No current facility-administered medications on file prior to visit.        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)   • Other pulmonary embolism without acute cor pulmonale (CMS/HCC)   • Nonrheumatic aortic valve stenosis       Past Medical History:   Diagnosis Date   • Anxiety    • Anxiety    • Aortic stenosis, severe    • Arthritis    • Arthritis    • Back pain    • Blood clotting disorder (CMS/HCC)    • Cancer associated pain    • Chronic low back pain with sciatica    • Dyspnea on effort    • Hodgkin's lymphoma (CMS/HCC)    • Immobility    • Low back pain    • Lupus    • Mid back pain    • Other pulmonary embolism without acute cor pulmonale (CMS/HCC)    • Pelvic pain    • Peripheral neuropathy    • Presence of intrathecal pump    • Sacroiliac joint disease    • SI (sacroiliac) joint inflammation (CMS/HCC)        Past Surgical History:   Procedure Laterality Date   • BACK SURGERY      • BLADDER REPAIR     • CARDIAC CATHETERIZATION N/A 3/6/2019    Procedure: Valvuloplasty;  Surgeon: Wayne Johnson MD;  Location: [facility] INVASIVE LOCATION;  Service: Cardiology   • CHOLECYSTECTOMY     • HYSTERECTOMY     • LYMPHADENECTOMY     • TONSILLECTOMY     • TUBAL ABDOMINAL LIGATION     • TUMOR REMOVAL         Family History   Problem Relation Age of Onset   • Pancreatic cancer Sister    • Pancreatic cancer Paternal Grandmother        Social History     Socioeconomic History   • Marital status: Single     Spouse name: Not on file   • Number of children: Not on file   • Years of education: Not on file   • Highest education level: Not on file   Tobacco Use   • Smoking status: Current Every Day Smoker     Packs/day: 2.00     Years: 41.00     Pack years: 82.00     Types: Cigarettes   • Smokeless tobacco: Never Used   Substance and Sexual Activity   • Alcohol use: No   • Drug use: No   • Sexual activity: Defer     Birth control/protection: Surgical     Comment: KRYSTAL       -------        Review of Systems   Constitutional: Negative for chills and fever.   HENT: Negative for ear pain.    Eyes: Negative for pain.   Respiratory: Negative for chest tightness and wheezing.    Cardiovascular: Positive for leg swelling. Negative for chest pain.   Gastrointestinal: Negative for abdominal pain, blood in stool, nausea and vomiting.   Genitourinary: Positive for pelvic pain (to see oncologist). Negative for dysuria.   Musculoskeletal: Positive for arthralgias, back pain, gait problem and neck pain.   Allergic/Immunologic: Negative for immunocompromised state.   Neurological: Positive for weakness (legs bilat.) and numbness (legs and feet bilaterally). Negative for dizziness and headaches.   Hematological: Does not bruise/bleed easily.   Psychiatric/Behavioral: Negative for agitation.     This [employer] report was a 4 page report.  Page 3 had the most pertinent data.  She has weaned off of alprazolam.  She admits that  "she is no longer taking it.      -------    Prelim UDS Immunoassay:    THC  (marijuana)  negative  VINICIUS (cocaine) negative  OPI (opiate) positive  AMP (amphet) negative  MET (methamph) negative  PCP (pcp)  negative  MDMA (ecstacy) negative  BAR (barbituate) negative  BZO (benzodiaz) negative  MTD (methadone) negative  TCA (tricyclic) negative  OXY (oxycodone) positive    GreenTemp warm  Appearance WNL    -------        Vitals:    03/20/19 1422   BP: 111/76   Pulse: 104   Temp: 97.8 °F (36.6 °C)   SpO2: 95%   Height: 165.1 cm (65\")   PainSc:   8   PainLoc: Back  Comment: legs         Objective   Physical Exam  VSS, NNR, NCAT, NMNA, NRD, AAOx3.  She has a very poor posture as usual.  Using her electric wheelchair.  Mild tenderness in the back.  The intrathecal pump pocket site is of normal appearance.      Assessment/Plan   Isha was seen today for back pain.    Diagnoses and all orders for this visit:    Chronic pain syndrome  -     MRI Lumbar Spine Without Contrast; Future  -     Urine Drug Screen Confirmation - Urine, Clean Catch; Future  -     POC Urine Drug Screen, Triage    Presence of intrathecal pump  -     Urine Drug Screen Confirmation - Urine, Clean Catch; Future  -     POC Urine Drug Screen, Triage    Chronic midline low back pain with bilateral sciatica    Other orders  -     HYDROcodone-acetaminophen (NORCO)  MG per tablet; Take 1 tablet by mouth Every 6 (Six) Hours As Needed for Severe Pain .        --- Follow-up for pump refill at next interval.... Ordering the medication for refill at double concentration    -- pump reprogram today... Delete PTM dosing    -- acute pain medication today to replace PTM for back pain flareups and breakthrough pain of sciatica.    -- update UDS today... Prelim result as above.  There is a positive on oxycodone but it is likely a false positive.  Sending for routine confirmation.    --Will need a plan for pump placement.      --Because of her increasing back " pain, lumbar MRI is indicated.  Previous lumbar spine MRI authorization request has .  We will see can you would.  The lumbar MRI is indicated for low back pain that has been worsening, in fact worsening over several months, despite conservative treatment, and considering that she is a surgical patient because of these considerations for continuation of advanced neuromodulation in the form of intrathecal drug delivery system, which is going to need to be replaced soon.      ---  Intrathecal pump was interrogated in office today. Results are in chart.     The patient is currently at a dose of 10mg of hydromorphone with qs ropivacaine per day. There is the addition of PTM bolus. This is set at 1/0.125 mg/actuation given over 5 minutes. The lock-out is set at 2 hours with a maximum activation of 4 in 24 hours.     Under sterile technique, the pump was accessed using a [employer] proprietary refill kit. The volume was withdrawn from the pump. The expected residual volume of the pump was 7 ml. The actual residual volume of the pump was approximate to the expected.     The pump was then refilled with 20 ml of Hydromorphone/Ropivacaine at a concentration of 20/2.5 mg/ml. The pump was set to continue at a rate of 10mg/1.25/day.  The PTM was discontinued.        Patient tolerated procedure well. Minimal bleeding was noted. Pump site remains intact with no evidence of erythema or drainage.     Patient will return for pump refill when due or sooner if needed.      JUAN is <4 months.  ---      Patient appears to be managing fairly well with current regimen. No adverse effects. Regarding continuation of opioids, there is no evidence of aberrant behavior or any red flags.  The patient continues with some positive response to opioid therapy. ADL's remain intact by self.  she does have a significant history of spinal deformity that is grossly obvious and demonstrates moderate improvement with multimodal therapy including  opioid therapy, which allows her to engage in some ADLs and family activities. The continuation of opioid therapy is therefore not contraindicated. We will however respect the March 2016 CDC Guidelines and will plan to avoid overexposure to opioids and avoid dose escalation.        TERRELL REPORT    As part of the patient's treatment plan, I am prescribing controlled substances. The patient has been made aware of appropriate use of such medications, including potential risk of somnolence, limited ability to drive and/or work safely, and the potential for dependence or overdose. It has also bee made clear that these medications are for use by this patient only, without concomitant use of alcohol or other substances unless prescribed.     Patient has completed prescribing agreement detailing terms of continued prescribing of controlled substances, including monitoring TERRELL reports, urine drug screening, and pill counts if necessary. The patient is aware that inappropriate use will results in cessation of prescribing such medications.    TERRELL report has been reviewed and scanned into the patient's chart.    As the clinician, I personally reviewed the TERRELL from as above while the patient was in the office today.    History and physical exam exhibit continued safe and appropriate use of controlled substances.      EMR Dragon/Transcription disclaimer:   Much of this encounter note is an electronic transcription/translation of spoken language to printed text. The electronic translation of spoken language may permit erroneous, or at times, nonsensical words or phrases to be inadvertently transcribed; Although I have reviewed the note for such errors, some may still exist.

## 2019-03-25 ENCOUNTER — RESULTS ENCOUNTER (OUTPATIENT)
Dept: PAIN MEDICINE | Facility: CLINIC | Age: 54
End: 2019-03-25

## 2019-03-25 DIAGNOSIS — Z97.8 PRESENCE OF INTRATHECAL PUMP: ICD-10-CM

## 2019-03-25 DIAGNOSIS — G89.4 CHRONIC PAIN SYNDROME: ICD-10-CM

## 2019-03-26 ENCOUNTER — OFFICE VISIT (OUTPATIENT)
Dept: PAIN MEDICINE | Facility: CLINIC | Age: 54
End: 2019-03-26

## 2019-03-26 VITALS
OXYGEN SATURATION: 95 % | HEART RATE: 94 BPM | DIASTOLIC BLOOD PRESSURE: 76 MMHG | SYSTOLIC BLOOD PRESSURE: 127 MMHG | TEMPERATURE: 98.6 F

## 2019-03-26 DIAGNOSIS — M53.3 SI (SACROILIAC) JOINT DYSFUNCTION: ICD-10-CM

## 2019-03-26 DIAGNOSIS — Z46.2 END OF BATTERY LIFE OF INTRATHECAL INFUSION PUMP: ICD-10-CM

## 2019-03-26 DIAGNOSIS — M54.42 CHRONIC MIDLINE LOW BACK PAIN WITH BILATERAL SCIATICA: ICD-10-CM

## 2019-03-26 DIAGNOSIS — Z97.8 PRESENCE OF INTRATHECAL PUMP: ICD-10-CM

## 2019-03-26 DIAGNOSIS — G89.4 CHRONIC PAIN SYNDROME: Primary | ICD-10-CM

## 2019-03-26 DIAGNOSIS — M54.41 CHRONIC MIDLINE LOW BACK PAIN WITH BILATERAL SCIATICA: ICD-10-CM

## 2019-03-26 DIAGNOSIS — G89.29 CHRONIC MIDLINE LOW BACK PAIN WITH BILATERAL SCIATICA: ICD-10-CM

## 2019-03-26 PROCEDURE — 62369 ANAL SP INF PMP W/REPRG&FILL: CPT | Performed by: NURSE PRACTITIONER

## 2019-03-26 PROCEDURE — 99214 OFFICE O/P EST MOD 30 MIN: CPT | Performed by: NURSE PRACTITIONER

## 2019-03-26 NOTE — PROGRESS NOTES
CHIEF COMPLAINT  F/U back pain- pump refill. Pain has remained the same.    Subjective   Isha Llanes is a 53 y.o. female  who presents to the office for follow-up.She has a history of chronic back pain. Reports her pain is unchanged since last office visit.  REports her pain is unchanged.  Was given Hydrocodone 10/325 q6hrs PRN from Dr Rios at last office visit acutely. Had to de-active PTM per Dr Rios order to make sure patient had enough medication.     Complains of pain in her low back. Today her pain is 8/10VAS. Describe the pain as continuous aching and throbbing. ADL'sby self. HAs been taking Hydrocodone 10/325 q6hrs PRN. Denies any side effects from this.   HER JUAN is < 4 months (June 2019)    Back Pain   This is a chronic problem. The current episode started more than 1 year ago. The problem occurs constantly. The problem has been gradually worsening since onset. The pain is present in the lumbar spine and sacro-iliac. The quality of the pain is described as aching. The pain is at a severity of 8/10. The pain is moderate. The symptoms are aggravated by bending, position, standing and twisting. Associated symptoms include numbness (legs and feet bilaterally), pelvic pain (to see oncologist) and weakness (legs bilat.). Pertinent negatives include no abdominal pain, chest pain, dysuria, fever or headaches. Risk factors include lack of exercise, sedentary lifestyle, menopause and history of cancer. She has tried analgesics, bed rest, chiropractic manipulation, heat, home exercises, ice, muscle relaxant, NSAIDs and walking for the symptoms. The treatment provided mild relief.      PEG Assessment   What number best describes your pain on average in the past week?7  What number best describes how, during the past week, pain has interfered with your enjoyment of life?9  What number best describes how, during the past week, pain has interfered with your general activity?  8    The following portions of  the patient's history were reviewed and updated as appropriate: allergies, current medications, past family history, past medical history, past social history, past surgical history and problem list.    Review of Systems   Constitutional: Negative for chills and fever.   HENT: Negative for ear pain.    Eyes: Negative for pain.   Respiratory: Negative for chest tightness and wheezing.    Cardiovascular: Positive for leg swelling. Negative for chest pain.   Gastrointestinal: Negative for abdominal pain, blood in stool, nausea and vomiting.   Genitourinary: Positive for pelvic pain (to see oncologist). Negative for dysuria.   Musculoskeletal: Positive for arthralgias, back pain, gait problem and neck pain.   Allergic/Immunologic: Negative for immunocompromised state.   Neurological: Positive for weakness (legs bilat.) and numbness (legs and feet bilaterally). Negative for dizziness and headaches.   Hematological: Does not bruise/bleed easily.   Psychiatric/Behavioral: Negative for agitation.       Vitals:    03/26/19 1251   BP: 127/76   Pulse: 94   Temp: 98.6 °F (37 °C)   SpO2: 95%   PainSc:   8   PainLoc: Back     Objective   Physical Exam   Constitutional: She is oriented to person, place, and time. She appears well-developed and well-nourished.   HENT:   Head: Normocephalic and atraumatic.   Eyes: Conjunctivae are normal.   Cardiovascular: Normal rate.   Pulmonary/Chest: Effort normal.   Abdominal:   Pump in LLQ   Musculoskeletal:        Lumbar back: She exhibits tenderness, bony tenderness and pain.   Surgical scar of lumbar spine   Neurological: She is alert and oriented to person, place, and time. Gait (uses motorized wheelchair) abnormal.   Skin: Skin is warm and dry.   Psychiatric: She has a normal mood and affect. Her speech is normal and behavior is normal. Thought content normal.     Intrathecal pump was interrogated in office today. Results are in chart.     The patient is currently at a dose of 10mg of  hydromorphone with qs ropivacaine per day.    Under sterile technique, the pump was accessed using a [employer] proprietary refill kit. The volume was withdrawn from the pump. The expected residual volume of the pump was 4 ml. The actual residual volume of the pump was 4 ml.     The pump was then refilled with 20 ml of Hydromorphone/Ropivacaine at a concentration of 40/5 mg/ml. The pump was set to continue at a rate of 10mg/1.25/day.  There is the addition of PTM bolus. This is set at 1/0.125 mg/actuation given over 5 minutes. The lock-out is set at 2 hours with a maximum activation of 3 in 24 hours.        Patient tolerated procedure well. Minimal bleeding was noted. Pump site remains intact with no evidence of erythema or drainage.     Patient will return for pump refill when due or sooner if needed.      JUAN is <4 months.    Assessment/Plan   Isha was seen today for back pain.    Diagnoses and all orders for this visit:    Chronic pain syndrome    Chronic midline low back pain with bilateral sciatica    SI (sacroiliac) joint dysfunction    Presence of intrathecal pump    End of battery life of intrathecal infusion pump  -     Case Request      --- Refill pump with concentration changes as per dr lopez order. Per Dr Lopez order, re-active PTM but change to 3/in 24 hours. Return to previous basal rate.  --- Discontinue Hydrocodone.  --- IT pump battery replacement-- JUAN < 4 months. Reviewed the procedure at length with the patient.  Included in the review was expectations, complications, risk and benefits.The procedure was described in detail and the risks, benefits and alternatives were discussed with the patient (including but not limited to: bleeding, infection, nerve damage, worsening of pain, inability to perform injection, paralysis, seizures, and death) who agreed to proceed.  Discussed the potential for sedation if warranted/wanted.  The procedure will plan to be performed at [facility] with fluoroscopic guidance(unless ultrasound is indicated). Questions were answered and in a way the patient could understand.  Patient verbalized understanding and wishes to proceed.  This intervention will be ordered.  --- proceed with lumbar MRI.  --- Follow-up with dr lopez after MRI.       TERRELL REPORT    As part of the patient's treatment plan, I am prescribing controlled substances. The patient has been made aware of appropriate use of such medications, including potential risk of somnolence, limited ability to drive and/or work safely, and the potential for dependence or overdose. It has also bee made clear that these medications are for use by this patient only, without concomitant use of alcohol or other substances unless prescribed.     Patient has completed prescribing agreement detailing terms of continued prescribing of controlled substances, including monitoring TERRELL reports, urine drug screening, and pill counts if necessary. The patient is aware that inappropriate use will results in cessation of prescribing such medications.    TERRELL report has been reviewed and scanned into the patient's chart.    As the clinician, I personally reviewed the TERRELL from 3-25-19 while the patient was in the office today.    History and physical exam exhibit continued safe and appropriate use of controlled substances.      EMR Dragon/Transcription disclaimer:   Much of this encounter note is an electronic transcription/translation of spoken language to printed text. The electronic translation of spoken language may permit erroneous, or at times, nonsensical words or phrases to be inadvertently transcribed; Although I have reviewed the note for such errors, some may still exist.

## 2019-04-02 ENCOUNTER — HOSPITAL ENCOUNTER (OUTPATIENT)
Dept: OTHER | Facility: HOSPITAL | Age: 54
Discharge: HOME | End: 2019-04-02
Attending: INTERNAL MEDICINE

## 2019-04-04 LAB
B19V IGG SER IA-ACNC: 4.4 INDEX (ref 0–0.8)
B19V IGM SER IA-ACNC: 0.5 INDEX (ref 0–0.8)

## 2019-04-05 ENCOUNTER — TELEPHONE (OUTPATIENT)
Dept: PAIN MEDICINE | Facility: CLINIC | Age: 54
End: 2019-04-05

## 2019-04-05 NOTE — TELEPHONE ENCOUNTER
Patient called and stated that she is in a lot of pain and unable to sleep. She states she is unable to have her MRI performed because she has cellulitis and is unable to put her legs down. She wants to know when you would be able to change her pump and what can we do about the intolerable pain she is. She stated that she may need more norco to help her get through the pain. What are your suggestions for Ms. Kessler?

## 2019-04-10 ENCOUNTER — HOSPITAL ENCOUNTER (OUTPATIENT)
Dept: WOUND CARE | Facility: HOSPITAL | Age: 54
Setting detail: RECURRING SERIES
Discharge: STILL ADMITTED OR ADMINISTRATIVE | End: 2019-04-30
Attending: FAMILY MEDICINE

## 2019-04-15 ENCOUNTER — TELEPHONE (OUTPATIENT)
Dept: PAIN MEDICINE | Facility: CLINIC | Age: 54
End: 2019-04-15

## 2019-05-08 ENCOUNTER — TELEPHONE (OUTPATIENT)
Dept: PAIN MEDICINE | Facility: CLINIC | Age: 54
End: 2019-05-08

## 2019-05-08 NOTE — TELEPHONE ENCOUNTER
Patient called and stated that since she has had her Naropin increased she has been sleep walking. She also mentioned that her primary put her on Gabapentin and she is now weaning off of that. She wants to know if we can take the Naropin out of the pump. I have placed an order for her med already but they don't make it for 24 hours before so we can change it if you want. She also wanted to know when we can schedule her pump surgery because she feels she is in better health to have the surgery. Please advise.

## 2019-05-10 ENCOUNTER — HOSPITAL ENCOUNTER (OUTPATIENT)
Dept: OTHER | Facility: HOSPITAL | Age: 54
Discharge: HOME | End: 2019-05-10
Attending: INTERNAL MEDICINE

## 2019-05-10 ENCOUNTER — OFFICE VISIT CONVERTED (OUTPATIENT)
Dept: ONCOLOGY | Facility: HOSPITAL | Age: 54
End: 2019-05-10
Attending: INTERNAL MEDICINE

## 2019-05-10 LAB
ALBUMIN SERPL-MCNC: 4 G/DL (ref 3.5–5)
ALBUMIN/GLOB SERPL: 1.1 {RATIO} (ref 1.4–2.6)
ALP SERPL-CCNC: 85 U/L (ref 53–141)
ALT SERPL-CCNC: 10 U/L (ref 10–40)
ANION GAP SERPL CALC-SCNC: 12 MMOL/L (ref 8–19)
AST SERPL-CCNC: 12 U/L (ref 15–50)
BASOPHILS # BLD AUTO: 0.01 10*3/UL (ref 0–0.2)
BASOPHILS NFR BLD AUTO: 0.2 % (ref 0–3)
BILIRUB SERPL-MCNC: 0.29 MG/DL (ref 0.2–1.3)
BUN SERPL-MCNC: 8 MG/DL (ref 5–25)
BUN/CREAT SERPL: 15 {RATIO} (ref 6–20)
CALCIUM SERPL-MCNC: 9.3 MG/DL (ref 8.7–10.4)
CHLORIDE SERPL-SCNC: 100 MMOL/L (ref 99–111)
CONV ABS IMM GRAN: 0.01 10*3/UL (ref 0–0.2)
CONV CO2: 30 MMOL/L (ref 22–32)
CONV IMMATURE GRAN: 0.2 % (ref 0–1.8)
CONV TOTAL PROTEIN: 7.5 G/DL (ref 6.3–8.2)
CREAT UR-MCNC: 0.53 MG/DL (ref 0.5–0.9)
DEPRECATED RDW RBC AUTO: 47.8 FL (ref 36.4–46.3)
EOSINOPHIL # BLD AUTO: 0.09 10*3/UL (ref 0–0.7)
EOSINOPHIL # BLD AUTO: 2 % (ref 0–7)
ERYTHROCYTE [DISTWIDTH] IN BLOOD BY AUTOMATED COUNT: 15 % (ref 11.7–14.4)
GFR SERPLBLD BASED ON 1.73 SQ M-ARVRAT: >60 ML/MIN/{1.73_M2}
GLOBULIN UR ELPH-MCNC: 3.5 G/DL (ref 2–3.5)
GLUCOSE SERPL-MCNC: 123 MG/DL (ref 65–99)
HBA1C MFR BLD: 13.4 G/DL (ref 12–16)
HCT VFR BLD AUTO: 42.9 % (ref 37–47)
LDH SERPL-CCNC: 170 U/L (ref 120–240)
LYMPHOCYTES # BLD AUTO: 1.23 10*3/UL (ref 1–5)
MCH RBC QN AUTO: 27 PG (ref 27–31)
MCHC RBC AUTO-ENTMCNC: 31.2 G/DL (ref 33–37)
MCV RBC AUTO: 86.3 FL (ref 81–99)
MONOCYTES # BLD AUTO: 0.52 10*3/UL (ref 0.2–1.2)
MONOCYTES NFR BLD AUTO: 11.4 % (ref 3–10)
NEUTROPHILS # BLD AUTO: 2.7 10*3/UL (ref 2–8)
NEUTROPHILS NFR BLD AUTO: 59.2 % (ref 30–85)
NRBC CBCN: 0 % (ref 0–0.7)
OSMOLALITY SERPL CALC.SUM OF ELEC: 286 MOSM/KG (ref 273–304)
PLATELET # BLD AUTO: 149 10*3/UL (ref 130–400)
PMV BLD AUTO: 10.3 FL (ref 9.4–12.3)
POTASSIUM SERPL-SCNC: 4.2 MMOL/L (ref 3.5–5.3)
RBC # BLD AUTO: 4.97 10*6/UL (ref 4.2–5.4)
SODIUM SERPL-SCNC: 138 MMOL/L (ref 135–147)
VARIANT LYMPHS NFR BLD MANUAL: 27 % (ref 20–45)
WBC # BLD AUTO: 4.56 10*3/UL (ref 4.8–10.8)

## 2019-05-13 NOTE — TELEPHONE ENCOUNTER
Patients daughter called and stated that Ms. Llanes is admitted to [facility]. Her alarm date is 5/20/19 for her pain pump.

## 2019-05-14 NOTE — TELEPHONE ENCOUNTER
Reached out to Yon and sent her an email with the information. She is going to work on it today and call me when she knows something. I also called her daughter Sheila who I have been speaking with, to ask her permission to have yon Underwood reach out to her in regards to the pump which was ok with. While on the phone with Sheila she was saying that her mother is in a ton of pain and that the hospital wont give her anything more than Dilaudid 1 mg TID because Ms. Llanes left her remote control at home and is not able to give herself her bolus. She was wanted to know if there was anything you can do for her mothers pain? Please advise.

## 2019-05-14 NOTE — TELEPHONE ENCOUNTER
India, thanks for digging into the chart to find out all this information today.      This is a complicated problem.  One thing on her side is that the alarm date is not until next Monday.  We have no idea how long she is to be in the hospital.    The last if there are a couple options on the management, but I do not know which one is the best one.  We could just wait a few days and see if she gets discharged and then if she can come to our office.  We could reach out to Virgil Yasmine from [facility] and see if they would be able to use their nursing services to go to the hospital and refill the pump themselves.  We could asked [facility] for emergency privileges for me and I could just go down there and fill it myself, but that would also require the pharmacy at [facility] to actually prepare the medicine as I could not get it from [facility] and then transport the drug myself.    You guys have any other ideas?I think we should ReachOut to [facility] as soon as possible and asked them about potentially going into the hospital and using their refill service to refill the pump.      Regarding the medication, we have discussed taking the ropivacaine out of the solution.  That is fine.   Of note, we cannot concentrate the opioid any more concentrated.  Also of note if we had to get the medication prepared through the inpatient pharmacy at [facility], they may not be able to prepare it at the concentration that we are using.  So I think that is why we need to enlist the help of [facility] first.

## 2019-05-15 NOTE — TELEPHONE ENCOUNTER
I have no hospital privileges there to place any orders.  Her daughter would be well served to go home and get the remote.

## 2019-05-21 ENCOUNTER — TELEPHONE (OUTPATIENT)
Dept: PAIN MEDICINE | Facility: CLINIC | Age: 54
End: 2019-05-21

## 2019-05-21 NOTE — TELEPHONE ENCOUNTER
Patient called- she was discharged from the hospital. 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 She wanted to find out about her pump replacement. I had her schedule an appt to see you. Is there anything else I need to update her on?

## 2019-05-30 ENCOUNTER — TELEPHONE (OUTPATIENT)
Dept: PAIN MEDICINE | Facility: CLINIC | Age: 54
End: 2019-05-30

## 2019-05-30 NOTE — TELEPHONE ENCOUNTER
Patient called and wanted to know if you would prescribe her more loratabs because her pain is increased or increase her pump medication. She states that she thinks she has cancer in her hip and she might be losing her leg. Joie is going to try and obtain her records from [facility]. Please advise.

## 2019-05-31 ENCOUNTER — HOSPITAL ENCOUNTER (OUTPATIENT)
Facility: HOSPITAL | Age: 54
Setting detail: HOSPITAL OUTPATIENT SURGERY
End: 2019-05-31
Attending: ANESTHESIOLOGY | Admitting: ANESTHESIOLOGY

## 2019-05-31 PROBLEM — Z46.2 END OF BATTERY LIFE OF INTRATHECAL INFUSION PUMP: Status: ACTIVE | Noted: 2019-05-31

## 2019-05-31 NOTE — TELEPHONE ENCOUNTER
Notified patient- we rescheduled her for her pump refill on 6/10/19 and she will discuss it with you on that day.

## 2019-06-03 ENCOUNTER — TELEPHONE (OUTPATIENT)
Dept: PAIN MEDICINE | Facility: CLINIC | Age: 54
End: 2019-06-03

## 2019-06-03 NOTE — TELEPHONE ENCOUNTER
Called and told Mrs. Llanes that I spoke with Dr. Rios and his decision was to cancel her pump battery replacement due to being on antibiotics for cellulitis. I gave her information on upcoming appointment for pump.

## 2019-06-04 ENCOUNTER — APPOINTMENT (OUTPATIENT)
Dept: PREADMISSION TESTING | Facility: HOSPITAL | Age: 54
End: 2019-06-04

## 2019-06-06 ENCOUNTER — TELEPHONE (OUTPATIENT)
Dept: PAIN MEDICINE | Facility: CLINIC | Age: 54
End: 2019-06-06

## 2019-06-06 NOTE — TELEPHONE ENCOUNTER
I spoke with Jose G and he stated that there is not way to determine an exact date that her battery will run out. He stated its an estimate because it is dependent on whether the patient has a PTM and the amount of changes throughout its lifetime. That determines the amount of times the motor moves so they wouldnt be able to determine the exact date. As of right now her appt has been cancelled. Please advise.

## 2019-06-07 NOTE — TELEPHONE ENCOUNTER
As we discussed, let's fill her for now and we can see her in the office to determine if she is well enough to have the implant

## 2019-06-12 ENCOUNTER — OFFICE VISIT (OUTPATIENT)
Dept: PAIN MEDICINE | Facility: CLINIC | Age: 54
End: 2019-06-12

## 2019-06-12 VITALS
HEART RATE: 102 BPM | TEMPERATURE: 97.9 F | HEIGHT: 65 IN | RESPIRATION RATE: 16 BRPM | WEIGHT: 166.2 LBS | OXYGEN SATURATION: 95 % | SYSTOLIC BLOOD PRESSURE: 123 MMHG | DIASTOLIC BLOOD PRESSURE: 86 MMHG | BODY MASS INDEX: 27.69 KG/M2

## 2019-06-12 DIAGNOSIS — Z46.2 END OF BATTERY LIFE OF INTRATHECAL INFUSION PUMP: ICD-10-CM

## 2019-06-12 DIAGNOSIS — Z97.8 PRESENCE OF INTRATHECAL PUMP: ICD-10-CM

## 2019-06-12 DIAGNOSIS — G89.4 CHRONIC PAIN SYNDROME: Primary | ICD-10-CM

## 2019-06-12 PROCEDURE — 62369 ANAL SP INF PMP W/REPRG&FILL: CPT | Performed by: ANESTHESIOLOGY

## 2019-06-12 RX ORDER — BUSPIRONE HYDROCHLORIDE 10 MG/1
10 TABLET ORAL
Refills: 0 | COMMUNITY
Start: 2019-05-22 | End: 2019-08-13

## 2019-06-12 RX ORDER — GLIMEPIRIDE 2 MG/1
2 TABLET ORAL DAILY
Refills: 1 | COMMUNITY
Start: 2019-05-22 | End: 2020-11-20

## 2019-06-12 RX ORDER — TRAMADOL HYDROCHLORIDE 50 MG/1
50 TABLET ORAL EVERY 6 HOURS PRN
Qty: 120 TABLET | Refills: 0 | Status: SHIPPED | OUTPATIENT
Start: 2019-06-12 | End: 2019-07-24

## 2019-06-12 NOTE — PROGRESS NOTES
CHIEF COMPLAINT  F/U back pain- pump refill- patient states that her pain has worsened.       Subjective   Isha Llanes is a 53 y.o. female  who presents to the office for follow-up.She has a history of chronic spine pain and deformity.      Intrathecal pump interrogation/refill/reprogramming:    - estimated volume is 4.4ml, actual was 4ml  -- pump was running Hydromorphone 20mg per ml, was refilled with 20 ml of Hydromorphone at 40mg per ml  -- basal was 10mg per day, and was changed to 12mg per day  -- PTM was 1mg TID, and was changed to QID.  -- battery replacement date is reading for July 2019.          History of Present Illness     PEG Assessment   What number best describes your pain on average in the past week?9  What number best describes how, during the past week, pain has interfered with your enjoyment of life?10  What number best describes how, during the past week, pain has interfered with your general activity?  10    --  The aforementioned information the Chief Complaint section and above subjective data including any HPI data has been personally reviewed and affirmed.  --        The following portions of the patient's history were reviewed and updated as appropriate: allergies, current medications, past family history, past medical history, past social history, past surgical history and problem list.    Review of Systems   Constitutional: Positive for activity change (decreased). Negative for chills and fever.   HENT: Negative for ear pain.    Eyes: Negative for pain.   Respiratory: Negative for chest tightness and wheezing.    Cardiovascular: Positive for leg swelling. Negative for chest pain.   Gastrointestinal: Negative for abdominal pain, blood in stool, nausea and vomiting.   Genitourinary: Positive for pelvic pain (to see oncologist). Negative for dysuria.   Musculoskeletal: Positive for arthralgias, back pain, gait problem and neck pain.   Allergic/Immunologic: Negative for  "immunocompromised state.   Neurological: Positive for weakness (legs bilat.) and numbness (legs and feet bilaterally). Negative for dizziness and headaches.   Hematological: Does not bruise/bleed easily.   Psychiatric/Behavioral: Positive for agitation and sleep disturbance.         Vitals:    06/12/19 1031   BP: 123/86   Pulse: 102   Resp: 16   Temp: 97.9 °F (36.6 °C)   SpO2: 95%   Weight: 75.4 kg (166 lb 3.2 oz)   Height: 165.1 cm (65\")   PainSc:   9   PainLoc: Back         Objective   Physical Exam  Vital signs include normal heart rate, increased pulse.  Normal temperature.  Nursing note reviewed.  Her legs continue to be swollen and red and consistent with appearance of cellulitis.      Assessment/Plan   Isha was seen today for back pain.    Diagnoses and all orders for this visit:    Chronic pain syndrome  -     Ambulatory Referral to Infectious Disease    Presence of intrathecal pump    End of battery life of intrathecal infusion pump    Other orders  -     traMADol (ULTRAM) 50 MG tablet; Take 1 tablet by mouth Every 6 (Six) Hours As Needed for Severe Pain .      -- acute pain reliever, Tramadol    -- Infectious Disease Consultation:  Patient needs an intrathecal pump replacement (end of battery life)... Has been dealing with cellulitis (MRSA)... Need help assessing if she can reasonably have this surgery soon.      --- Follow-up after Infectious disease consult...    --Medically, I am do not recommend use of CBD, as it will make her drug screen test positive for THC.  Further discussion and education items are noted below.           -----  Education about CBD supplements and Medical Marijuana  Updated August 2018    There has been a sea change regarding perceptions about marijuana use as well as marijuana-derived substances such CBD oils.  While many persons believe that marijuana is a cure-all, unfortunately this is not the case.  There is a common belief that since marijuana is a plant, that this " "\"all natural\" or otherwise \"organic\" origin makes it both a natural remedy and a safe product, but unfortunately this is note the case either.     Regarding CBD (cannabidiol), it does not appear to be psychoactive like THC, but it does have some psychoactive effects.  However, its medical effects are questionable at best.  Anecdotal reports do not constitute scientific evidence.   CBD products at this time have THC present in them.  Different sources cite that anywhere from 20% to all the CBD products sold will cause a person to confirm THC positivity on drug testing.  A risk of CBD is that 10-20% of persons who utilize it will have an increase in liver enzymes, which is a marker for liver injury.      It is difficult to study marijuana derived products, due to the Schedule-I status of marijuana by the [employer] (Drug Enforcement Agency) of the [place].  The American Society of Regional Anest[employer] and Pain [employer] (DARYN) has issued a position statement urging the federal government to reclassify marijuana as a Schedule-II drug to allow easier access to enter into clinical trials.  The hope would be to identify uses and appropriate candidates and to also allow for development of more tailored therapies to limit side effects.  DARYN also has urged the National Institutes of Health to develop   research guidelines for cannabis in order to facilitate research.     Difficulties in use of marijuana medically stem from this lack of research.  There is no reasonable evidence that marijuana or any derivatives thereof including CBD have any antiinflammatory properties or antianxiety properties.  There have been some very small animal studies and also small human studies that were very biased and of poor quality, and therefore does not constitute medical evidence.  Anecdotal \"evidence\" is not evidence.  There is no evidence that marijuana or any derivative cures cancer.  There is evidence that marijuana can prevent 20% of " "seizures for patients with a rare type of pediatric seizure disorder.   There is no way to know how to dose marijuana.  This \"natural\" product is not regulated with regards to any standardization of dosing.  Furthermore, marijuana and its derivatives can and do interfere with metabolism of other drugs.      The best guidelines for the consideration for medical marijuana comes from the [employer] of [place].  A publication in their journal, [employer], (Karan et al, CFP, Feb 2008, 64 111-120) discusses best practices for medical marijuana use, which of course is applicable only to [place].  Again, in the [place], it is not appropriate to consider because of our federal guidelines.  The CFP article recommends use of Nabiximols or Nabilone strongly over the raw marijuana plant, and recommends strongly against use of the marijuana plant as the initial product to try.  They continue to recommend strongly against smoking marijuana.  Only three conditions show marijuana performing better than placebo:  Chronic refractory neuropathic pain or end stage cancer pain, spasticity due to multiple sclerosis or spinal cord injury, and post-chemotherapy nausea and vomiting.     Regarding chronic pain secondary to neuropathic pain or end stage cancer pain, and this is as an adjuvant analgesic only.  Again, it is NOT recommended as 1st/2nd line therapy.  The CFP recommends against marijuana for most conditions.  Strong recommendations are against use for acute pain, headache, and rheumatologic pain.  The CFP goes on to strongly recommend against marijuana for primary or secondary lines of therapy for neuropathic pain, and continues to state a recommendation against use as monotherapy even as 3rd line.  For refractory neuropathy pain, the CFP requires an intense discussion about the limited benefit and the known risks and harms of marijuana, and this discussion must be documented, and requires " trials of at least 3 analgesics prior to use of marijuana.  For cancer pain, the CFP issues a strong recommendation against monotherapy and also 1st/2nd line therapy use, requires the same documented discussion for informed consent, and also continues to recommend a trial of at least 2 analgesics prior to marijuana.  Regarding use for spasticity, the CFP article reflects the same recommendations against marijuana or other derived products as 1st or 2nd line therapies.      In conclusion, at this time, the use of marijuana is illegal in the [place], despite any deregulation that an individual state or [facility] may institute.  The presence of THC on drug screening objectively constitutes marijuana use regardless of source, including CBD, and this would preclude the prescription of controlled substances to persons who have THC in their circulation.      ------                  EMR Dragon/Transcription disclaimer:   Much of this encounter note is an electronic transcription/translation of spoken language to printed text. The electronic translation of spoken language may permit erroneous, or at times, nonsensical words or phrases to be inadvertently transcribed; Although I have reviewed the note for such errors, some may still exist.

## 2019-07-22 ENCOUNTER — HOSPITAL ENCOUNTER (OUTPATIENT)
Dept: PET IMAGING | Facility: HOSPITAL | Age: 54
Discharge: HOME | End: 2019-07-22
Attending: INTERNAL MEDICINE

## 2019-07-24 ENCOUNTER — OFFICE VISIT (OUTPATIENT)
Dept: PAIN MEDICINE | Facility: CLINIC | Age: 54
End: 2019-07-24

## 2019-07-24 VITALS
TEMPERATURE: 98.9 F | WEIGHT: 166 LBS | RESPIRATION RATE: 16 BRPM | DIASTOLIC BLOOD PRESSURE: 76 MMHG | BODY MASS INDEX: 27.66 KG/M2 | SYSTOLIC BLOOD PRESSURE: 112 MMHG | HEART RATE: 94 BPM | OXYGEN SATURATION: 95 % | HEIGHT: 65 IN

## 2019-07-24 DIAGNOSIS — Z97.8 PRESENCE OF INTRATHECAL PUMP: ICD-10-CM

## 2019-07-24 DIAGNOSIS — M54.41 CHRONIC LOW BACK PAIN WITH BILATERAL SCIATICA, UNSPECIFIED BACK PAIN LATERALITY: ICD-10-CM

## 2019-07-24 DIAGNOSIS — L02.419 CELLULITIS AND ABSCESS OF LOWER EXTREMITY: ICD-10-CM

## 2019-07-24 DIAGNOSIS — L03.119 CELLULITIS AND ABSCESS OF LOWER EXTREMITY: ICD-10-CM

## 2019-07-24 DIAGNOSIS — G89.3 CANCER ASSOCIATED PAIN: ICD-10-CM

## 2019-07-24 DIAGNOSIS — G89.29 CHRONIC LOW BACK PAIN WITH BILATERAL SCIATICA, UNSPECIFIED BACK PAIN LATERALITY: ICD-10-CM

## 2019-07-24 DIAGNOSIS — G89.4 CHRONIC PAIN SYNDROME: Primary | ICD-10-CM

## 2019-07-24 DIAGNOSIS — M54.42 CHRONIC LOW BACK PAIN WITH BILATERAL SCIATICA, UNSPECIFIED BACK PAIN LATERALITY: ICD-10-CM

## 2019-07-24 PROCEDURE — 99214 OFFICE O/P EST MOD 30 MIN: CPT | Performed by: ANESTHESIOLOGY

## 2019-07-24 PROCEDURE — 62369 ANAL SP INF PMP W/REPRG&FILL: CPT | Performed by: ANESTHESIOLOGY

## 2019-07-24 RX ORDER — HYDROCODONE BITARTRATE AND ACETAMINOPHEN 10; 325 MG/1; MG/1
1 TABLET ORAL EVERY 6 HOURS PRN
Qty: 60 TABLET | Refills: 0 | Status: SHIPPED | OUTPATIENT
Start: 2019-07-24 | End: 2019-08-20 | Stop reason: SDUPTHER

## 2019-07-24 NOTE — PROGRESS NOTES
"CHIEF COMPLAINT  F/U back pain- pump refill- patient states hat her pain has worsened.     Subjective   Isha Llanes is a 53 y.o. female  who presents to the office for follow-up.She has a history of back pain and intrathecal drug delivery system therapy, history of cancer..    I reviewed a packet of notes from [facility].  There was hospitalization from May 2019 and on this discharge summary the patient was noted to have severe aortic stenosis.  The concern was including a possible lymphoma.  There was also note the patient left AMA as she was willing to wait to see the cardiologist.  There was noted strep biology knees, that is group A strep, as well as staph aureus dual positivity's wound culture.  Impression included cellulitis and sepsis and comorbidities including COPD and CHF and valvular heart disease and poor diabetes control.    Chest x-ray report from May 12, 2019 included chronic interstitial lung disease.    There was a CT of the abdomen and pelvis from May 14, 2019, incidentally there was L4-5 and L5-S1 multilevel disc disease.  Notably there was increased size of retroperitoneal lymph nodes and iliac chain lymph nodes and inguinal lymph nodes, \"likely related to known lymphoma.  Also noted on the study was advanced bilateral hip arthritis.    During the hospital admission I reviewed a pulmonary critical care consult from May 12, 2019 for sepsis.  She had a level of thrombocytopenia at 104,000 and she had high fevers.      Intrathecal pump interrogation and reprogramming and refills were initiated.  This is a 20 mL Medtronic SynchroMed 2 pump.  End of life is approaching and there is a note to replace the pump by October 17, 2019.  The estimated reservoir volume was 4.5 mL with the active actual reserve being 3.5.  This amount was drawn was discarded the patient was previously on a simple continuous infusion of 12 mg of hydromorphone a day.  This right after refill was decreased to " 10 mg/day.  The PTM was discontinued.  Fill date becomes October 3.    Back Pain   This is a chronic problem. The current episode started more than 1 year ago. The problem has been gradually worsening since onset. The pain is present in the lumbar spine. The quality of the pain is described as aching and stabbing. The pain is severe. The symptoms are aggravated by bending, sitting, standing, twisting and position. Associated symptoms include numbness (legs and feet bilaterally), pelvic pain (to see oncologist) and weakness (legs bilat.). Pertinent negatives include no abdominal pain, chest pain, dysuria, fever or headaches. Risk factors include lack of exercise, poor posture, sedentary lifestyle and history of cancer. She has tried analgesics, bed rest, heat and ice for the symptoms. The treatment provided no relief.        She is complaining of severe and increasing pain in the back, on the midline, that increases with any activity.  There is aching and stabbing pain.  She did not have any recent falls or new acute injuries.    PEG Assessment   What number best describes your pain on average in the past week?9  What number best describes how, during the past week, pain has interfered with your enjoyment of life?9  What number best describes how, during the past week, pain has interfered with your general activity?  9    --  The aforementioned information the Chief Complaint section and above subjective data including any HPI data has been personally reviewed and affirmed.  --        The following portions of the patient's history were reviewed and updated as appropriate: allergies, current medications, past family history, past medical history, past social history, past surgical history and problem list.    -------    The following portions of the patient's history were reviewed and updated as appropriate: allergies, current medications, past family history, past medical history, past social history, past surgical  history and problem list.    Allergies   Allergen Reactions   • Amoxicillin    • Ceclor [Cefaclor]    • Compazine [Prochlorperazine Edisylate]    • Contrast Dye    • Penicillins    • Phenergan [Promethazine Hcl]    • Sulfa Antibiotics          Current Outpatient Medications:   •  acetaminophen (TYLENOL) 325 MG tablet, Take 325 mg by mouth As Needed for Mild Pain (1-3)., Disp: , Rfl:   •  albuterol (PROVENTIL HFA;VENTOLIN HFA) 108 (90 Base) MCG/ACT inhaler, Inhale 2 puffs Every 4 (Four) Hours As Needed for Wheezing., Disp: , Rfl:   •  busPIRone (BUSPAR) 10 MG tablet, Take 10 mg by mouth every night at bedtime., Disp: , Rfl: 0  •  furosemide (LASIX) 40 MG tablet, Take 40 mg by mouth Daily., Disp: , Rfl:   •  gabapentin (NEURONTIN) 300 MG capsule, Take 600 mg by mouth 3 (Three) Times a Day., Disp: , Rfl:   •  glimepiride (AMARYL) 2 MG tablet, Take 2 mg by mouth Daily., Disp: , Rfl: 1  •  HYDROmorphone (DILAUDID) 1 mg/mL solution, Infuse  into a venous catheter Dose Adjusted By Provider As Needed., Disp: , Rfl:   •  SITagliptin (JANUVIA) 100 MG tablet, Take 100 mg by mouth Daily., Disp: , Rfl:   •  HYDROcodone-acetaminophen (NORCO)  MG per tablet, Take 1 tablet by mouth Every 6 (Six) Hours As Needed for Severe Pain ., Disp: 60 tablet, Rfl: 0    Current Outpatient Medications on File Prior to Visit   Medication Sig Dispense Refill   • acetaminophen (TYLENOL) 325 MG tablet Take 325 mg by mouth As Needed for Mild Pain (1-3).     • albuterol (PROVENTIL HFA;VENTOLIN HFA) 108 (90 Base) MCG/ACT inhaler Inhale 2 puffs Every 4 (Four) Hours As Needed for Wheezing.     • busPIRone (BUSPAR) 10 MG tablet Take 10 mg by mouth every night at bedtime.  0   • furosemide (LASIX) 40 MG tablet Take 40 mg by mouth Daily.     • gabapentin (NEURONTIN) 300 MG capsule Take 600 mg by mouth 3 (Three) Times a Day.     • glimepiride (AMARYL) 2 MG tablet Take 2 mg by mouth Daily.  1   • HYDROmorphone (DILAUDID) 1 mg/mL solution Infuse  into a  venous catheter Dose Adjusted By Provider As Needed.     • SITagliptin (JANUVIA) 100 MG tablet Take 100 mg by mouth Daily.       No current facility-administered medications on file prior to visit.        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)   • Other pulmonary embolism without acute cor pulmonale (CMS/HCC)   • Nonrheumatic aortic valve stenosis   • End of battery life of intrathecal infusion pump       Past Medical History:   Diagnosis Date   • Anxiety    • Anxiety    • Aortic stenosis, severe    • Arthritis    • Arthritis    • Back pain    • Blood clotting disorder (CMS/HCC)    • Cancer associated pain    • Chronic low back pain with sciatica    • Dyspnea on effort    • Hodgkin's lymphoma (CMS/HCC)    • Immobility    • Low back pain    • Lupus    • Mid back pain    • Other pulmonary embolism without acute cor pulmonale (CMS/HCC)    • Pelvic pain    • Peripheral neuropathy    • Presence of intrathecal pump    • Sacroiliac joint disease    • SI (sacroiliac) joint inflammation (CMS/HCC)        Past Surgical History:   Procedure Laterality Date   • BACK SURGERY     • BLADDER REPAIR     • CARDIAC CATHETERIZATION N/A 3/6/2019    Procedure: Valvuloplasty;  Surgeon: Wayne Johnson MD;  Location: [facility] INVASIVE LOCATION;  Service: Cardiology   • CHOLECYSTECTOMY     • HYSTERECTOMY     • LYMPHADENECTOMY     • TONSILLECTOMY     • TUBAL ABDOMINAL LIGATION     • TUMOR REMOVAL         Family History   Problem Relation Age of Onset   • Pancreatic cancer Sister    •  "Pancreatic cancer Paternal Grandmother        Social History     Socioeconomic History   • Marital status: Single     Spouse name: Not on file   • Number of children: Not on file   • Years of education: Not on file   • Highest education level: Not on file   Tobacco Use   • Smoking status: Current Every Day Smoker     Packs/day: 2.00     Years: 41.00     Pack years: 82.00     Types: Cigarettes   • Smokeless tobacco: Never Used   Substance and Sexual Activity   • Alcohol use: No   • Drug use: No   • Sexual activity: Defer     Birth control/protection: Surgical     Comment: KRYSTAL       -------        Review of Systems   Constitutional: Positive for activity change (decreased). Negative for chills and fever.   HENT: Negative for ear pain.    Eyes: Negative for pain.   Respiratory: Negative for chest tightness and wheezing.    Cardiovascular: Positive for leg swelling. Negative for chest pain.   Gastrointestinal: Negative for abdominal pain, blood in stool, nausea and vomiting.   Genitourinary: Positive for pelvic pain (to see oncologist). Negative for dysuria.   Musculoskeletal: Positive for arthralgias, back pain, gait problem and neck pain.   Allergic/Immunologic: Negative for immunocompromised state.   Neurological: Positive for weakness (legs bilat.) and numbness (legs and feet bilaterally). Negative for dizziness and headaches.   Hematological: Does not bruise/bleed easily.   Psychiatric/Behavioral: Positive for agitation and sleep disturbance.       ----------------  [employer] report is reviewed with a request #[id number].  It is a 4 page report.  The patient has been receiving the intrathecal infusion and also she takes gabapentin.  ----------------    Vitals:    07/24/19 1250   BP: 112/76   Pulse: 94   Resp: 16   Temp: 98.9 °F (37.2 °C)   SpO2: 95%   Weight: 75.3 kg (166 lb)   Height: 165.1 cm (65\")   PainSc:   9   PainLoc: Back         Objective   Physical Exam  VSS, NNR, NCAT, NMNA, NRD, AAOx3.  She looks " uncomfortable.  She has lower extremity cellulitis bilaterally.      Assessment/Plan   Isha was seen today for back pain.    Diagnoses and all orders for this visit:    Chronic pain syndrome    Presence of intrathecal pump    Cancer associated pain    Chronic low back pain with bilateral sciatica, unspecified back pain laterality    Cellulitis and abscess of lower extremity    Other orders  -     HYDROcodone-acetaminophen (NORCO)  MG per tablet; Take 1 tablet by mouth Every 6 (Six) Hours As Needed for Severe Pain .        Summary, she has had recurrence of lymphoma, and she also has significant back pain that is acutely flared up.  I am concerned about her intrathecal pump function, we still have a plan to place intrathecal pump, replacing the catheter after she previously had a less than normal study.  However we cannot perform surgery at this time with the ongoing cellulitis.  She also has very significant medical comorbidities as reviewed above.  She has a pending consultation with infectious disease specialist and we definitely need their guidance in helping us to see if we could coordinate some healing of her current condition with intrathecal pump replacement surgery.      In addition to the intrathecal pump refill and programming, this was an extended office visit of 30 minutes in duration with 20 minutes in education and counseling, mainly focused on her ongoing plan of care, the need for the infectious disease consult, working on potential surgical planning, and also proper considerations for medication use and safety and weighing risk and benefits and obtaining informed consent regarding the use of the medication, with her significant and severe level of suffering, and how to safely manage a trial of some medication in the short-term while we are trying to find a better long-term solution.      --- Follow-up 2 weeks               TERRELL VELAZQUEZ    As part of the patient's treatment plan, I am  prescribing controlled substances. The patient has been made aware of appropriate use of such medications, including potential risk of somnolence, limited ability to drive and/or work safely, and the potential for dependence or overdose. It has also bee made clear that these medications are for use by this patient only, without concomitant use of alcohol or other substances unless prescribed.     Patient has completed prescribing agreement detailing terms of continued prescribing of controlled substances, including monitoring TERRELL reports, urine drug screening, and pill counts if necessary. The patient is aware that inappropriate use will results in cessation of prescribing such medications.    TERRELL report has been reviewed and scanned into the patient's chart.    As the clinician, I personally reviewed the TERRELL from as above while the patient was in the office today.    History and physical exam exhibit continued safe and appropriate use of controlled substances.      EMR Dragon/Transcription disclaimer:   Much of this encounter note is an electronic transcription/translation of spoken language to printed text. The electronic translation of spoken language may permit erroneous, or at times, nonsensical words or phrases to be inadvertently transcribed; Although I have reviewed the note for such errors, some may still exist.

## 2019-08-02 ENCOUNTER — TELEPHONE (OUTPATIENT)
Dept: PAIN MEDICINE | Facility: CLINIC | Age: 54
End: 2019-08-02

## 2019-08-02 NOTE — TELEPHONE ENCOUNTER
Mrs. Llanes called in asking how come she wasn't scheduled for a f/u appt 2 weeks after her last f/u on 7/24/19. I checked your last ov note and it does say f/u in 2 weeks but she was scheduled for 8/21/19. I checked your schedule for 8/7/19 which is 2 weeks from 7/24/19 and noticed you'll be here in the afternoon, could she come in and be seen? She states she is really hurting. Please advise.

## 2019-08-05 ENCOUNTER — TELEPHONE (OUTPATIENT)
Dept: PAIN MEDICINE | Facility: CLINIC | Age: 54
End: 2019-08-05

## 2019-08-05 NOTE — TELEPHONE ENCOUNTER
Pt called back and said she cant make it to the 8/7/19 appt so she cancelled and kept the original 8/21/19 appt.

## 2019-08-08 ENCOUNTER — TELEPHONE (OUTPATIENT)
Dept: PAIN MEDICINE | Facility: CLINIC | Age: 54
End: 2019-08-08

## 2019-08-08 NOTE — TELEPHONE ENCOUNTER
Patient called and stated that she is having increased arthritic pain all mainly in her left hip but she is having pain all over. She wanted to know if you could prescribe her a medrol dose pack. Please advise.

## 2019-08-09 ENCOUNTER — DOCUMENTATION (OUTPATIENT)
Dept: PAIN MEDICINE | Facility: CLINIC | Age: 54
End: 2019-08-09

## 2019-08-09 NOTE — TELEPHONE ENCOUNTER
No steroid, as she is leading up to a visit with the Infectious Disease specialist [facility] 8-13-19

## 2019-08-09 NOTE — PROGRESS NOTES
Spoke with pt told her dr. Rios recommends no steroid dose pack at this time, and she needs to see her ID doctor on 8/13/19. Pt verbalized understanding.

## 2019-08-13 ENCOUNTER — OFFICE VISIT (OUTPATIENT)
Dept: INFECTIOUS DISEASES | Facility: CLINIC | Age: 54
End: 2019-08-13

## 2019-08-13 VITALS
DIASTOLIC BLOOD PRESSURE: 73 MMHG | HEIGHT: 65 IN | HEART RATE: 87 BPM | WEIGHT: 166 LBS | BODY MASS INDEX: 27.66 KG/M2 | SYSTOLIC BLOOD PRESSURE: 114 MMHG

## 2019-08-13 DIAGNOSIS — E11.8 TYPE 2 DIABETES MELLITUS WITH COMPLICATION, WITHOUT LONG-TERM CURRENT USE OF INSULIN (HCC): ICD-10-CM

## 2019-08-13 DIAGNOSIS — I87.2 CHRONIC VENOUS STASIS DERMATITIS OF BOTH LOWER EXTREMITIES: Primary | ICD-10-CM

## 2019-08-13 DIAGNOSIS — Z86.14 PERSONAL HISTORY OF MRSA (METHICILLIN RESISTANT STAPHYLOCOCCUS AUREUS): ICD-10-CM

## 2019-08-13 DIAGNOSIS — M54.41 CHRONIC LOW BACK PAIN WITH BILATERAL SCIATICA, UNSPECIFIED BACK PAIN LATERALITY: ICD-10-CM

## 2019-08-13 DIAGNOSIS — M54.42 CHRONIC LOW BACK PAIN WITH BILATERAL SCIATICA, UNSPECIFIED BACK PAIN LATERALITY: ICD-10-CM

## 2019-08-13 DIAGNOSIS — G89.29 CHRONIC LOW BACK PAIN WITH BILATERAL SCIATICA, UNSPECIFIED BACK PAIN LATERALITY: ICD-10-CM

## 2019-08-13 DIAGNOSIS — Z72.0 TOBACCO ABUSE: ICD-10-CM

## 2019-08-13 PROCEDURE — 99214 OFFICE O/P EST MOD 30 MIN: CPT | Performed by: INTERNAL MEDICINE

## 2019-08-13 RX ORDER — LANCETS
EACH MISCELLANEOUS
Refills: 11 | COMMUNITY
Start: 2019-07-30 | End: 2020-11-20

## 2019-08-13 RX ORDER — BLOOD SUGAR DIAGNOSTIC
STRIP MISCELLANEOUS
Refills: 11 | COMMUNITY
Start: 2019-06-19 | End: 2020-11-20

## 2019-08-13 RX ORDER — MUPIROCIN CALCIUM 20 MG/G
CREAM TOPICAL 2 TIMES DAILY
Qty: 15 G | Refills: 0 | Status: SHIPPED | OUTPATIENT
Start: 2019-08-13 | End: 2020-11-20

## 2019-08-13 NOTE — PROGRESS NOTES
"cc: Cellulitis (wants to make sure MRSA  is not an issue)    This very nice 53-year-old we are asked for evaluation opinion regarding MRSA.    Patient is here for evaluation of recurrent infections ahead of possible pain pump insertion.  She has chronic lumbar back pain which radiates to her legs and for which she sees Dr. Rios.  Is better with analgesia but incompletely relieved.  He is planning to insert a pain pump but assess evaluate.      Patient reports that she was admitted to [facility] in June 2019 for sepsis.  Apparently at that time she was diagnosed with MRSA.  Possible sources bilateral lower extremity cellulitis.  Dr. Rios's note from July shows that she was admitted on May 12, 2019 for sepsis and a wound culture grew group A strep as well as MRSA.  We are working to obtain those records.    Patient reports that since discharge she continues to have some erythema in the lower extremities.  This tends to improve when she has less edema and takes her Lasix.  It is pruritic.  No associated fevers or chills or night sweats.    Past medical history: Diabetes mellitus type II, GERD, peptic ulcer disease, osteoarthritis, lupus, fibromyalgia, chronic pain syndrome, coagulopathy with clotting disorder, lymphoma, hysterectomy, tonsillectomy, aortic valvular stenosis status post balloon valvuloplasty, cholecystectomy, bladder repair  Family history notable for premature heart disease  Social history: She lives with her daughter and [place], [place].  Smokes 2 packs/day.     Review of Systems: Double vision.  Abdominal pain.  Frequent urination.  Edema.  All other reviewed and negative except as per HPI    Blood pressure 114/73, pulse 87, height 165.1 cm (65\"), weight 75.3 kg (166 lb).  GENERAL: Awake and alert, in no acute distress.   HEENT: Oropharynx is clear. Hearing is grossly normal.   EYES: PERRL. No conjunctival injection. No lid lag.   LYMPHATICS: No lymphadenopathy of the neck or " inguinal regions.   HEART: Regular rate and rhythm with a chest murmur.  1-2+ lower extremity peripheral edema.   LUNGS: Clear to auscultation anteriorly with normal respiratory effort.   ABDOMEN: Soft, nontender, nondistended. No appreciable organomegaly.   SKIN: Warm and dry witth venous stasis changes of the bilateral lower extremities  PSYCHIATRIC: Appropriate mood, affect.  Limited insight, and judgment.       DIAGNOSTICS:  Lab Results   Component Value Date    WBC 6.78 03/07/2019    HGB 11.8 (L) 03/07/2019    HCT 39.6 03/07/2019     03/07/2019       Lab Results   Component Value Date    GLUCOSE 208 (H) 03/07/2019    BUN 11 03/07/2019    CREATININE 0.62 03/07/2019    EGFRIFNONA 101 03/07/2019    BCR 17.7 03/07/2019    CO2 24.7 03/07/2019    CALCIUM 9.0 03/07/2019         Assessment and Plan  Chronic venous stasis dermatitis of both lower extremities  Chronic low back pain with bilateral sciatica, unspecified back pain laterality  Personal history of MRSA (methicillin resistant Staphylococcus aureus)  Type 2 diabetes mellitus with complication, without long-term current use of insulin (CMS/[facility])  Tobacco abuse    I think she would be reasonable candidate for surgery.  I see no obvious infectious diseases contraindication to pain pump insertion.  I think her erythema in the lower extremities is more consistent with venous stasis dermatitis rather than acute cellulitis.  Therefore I am going to give her some topical steroids to control the pruritus and inflammation.    We are working to get her records from [facility] but it appears she just had a wound culture positive for MRSA.  We will send her through a modified decolonization approach marked with Bactroban which I prescribed, bleach baths and chlorhexidine rinses for the 1 week prior to her pump insertion.  I gave her written details on this and we did review them in detail with the patient today    We discussed the importance of strict glycemic  control and cessation from tobacco in preventing/controlling infectious complications.

## 2019-08-14 ENCOUNTER — DOCUMENTATION (OUTPATIENT)
Dept: INFECTIOUS DISEASES | Facility: CLINIC | Age: 54
End: 2019-08-14

## 2019-08-14 NOTE — PROGRESS NOTES
Outside hospital records obtained.  Grew group A strep and MRSA from wound culture but blood cultures were negative.  Currently no active cellulitis in the lower extremities and no change in recommendations from yesterday's note

## 2019-08-20 DIAGNOSIS — Z46.2 END OF BATTERY LIFE OF INTRATHECAL INFUSION PUMP: Primary | ICD-10-CM

## 2019-08-20 RX ORDER — HYDROCODONE BITARTRATE AND ACETAMINOPHEN 10; 325 MG/1; MG/1
1 TABLET ORAL 2 TIMES DAILY PRN
Qty: 60 TABLET | Refills: 0 | Status: SHIPPED | OUTPATIENT
Start: 2019-08-24 | End: 2019-08-21 | Stop reason: SDUPTHER

## 2019-08-21 ENCOUNTER — TELEPHONE (OUTPATIENT)
Dept: PAIN MEDICINE | Facility: CLINIC | Age: 54
End: 2019-08-21

## 2019-08-21 DIAGNOSIS — Z01.818 PREOP TESTING: Primary | ICD-10-CM

## 2019-08-21 RX ORDER — HYDROCODONE BITARTRATE AND ACETAMINOPHEN 10; 325 MG/1; MG/1
1 TABLET ORAL EVERY 6 HOURS PRN
Qty: 120 TABLET | Refills: 0 | Status: SHIPPED | OUTPATIENT
Start: 2019-08-21 | End: 2019-09-23 | Stop reason: SDUPTHER

## 2019-08-21 NOTE — PROGRESS NOTES
Because of her complicated status, I would feel more comfortable with removing her intrathecal pump and allowing that pocket to heal prior to bringing her back for implant of the new pump.  The procedure would be complicated because the explant will need to be done in a supine position to remove the previous pump and seal that pocket, but the implant will need to be done in a prone position.  I think it would be problematic to remove the previous pump and then flip her over to her belly and be lying on that new incision for more surgery, increasing complication risk.    Therefore, the plan will be to explant the intrathecal pump and then we will plan for implantation of a new system at a later date & after healing.

## 2019-08-21 NOTE — TELEPHONE ENCOUNTER
Ms. Llanes called back and stated that she went to  her medication and she was unable to because there is a do not fill date in the prescription. She stated that she is out of medication because the last one she was prescribed was a 15 day supply. She wanted to know if you could send in another prescription so she can get it right away. She stated that the last prescription or every 6 hrs was barely improving her pain and the PTM's were working better. Please advise.

## 2019-08-21 NOTE — TELEPHONE ENCOUNTER
She agrees with the plan. She would like to know what the plan is for pain management while she is not receiving medication from her pain pump. Please advise.

## 2019-08-21 NOTE — TELEPHONE ENCOUNTER
----- Message from Karine Quintero sent at 8/21/2019  7:29 AM EDT -----  Regarding: FW: cancel 8-21-19 office visit      ----- Message -----  From: Horace Rios MD  Sent: 8/21/2019  12:08 AM  To: Annatamica Morales, Karine Quintero, #  Subject: cancel 8-21-19 office visit                      I do not think she needs to come for the office visit on August 21, as the [facility] will be very difficult [facility]    Please discuss with 1 of the clinical staff regarding her ongoing plan of care, and I think it might be best if 1 of them will call the patient to let her know we are canceling the office visit for Wednesday, August 21 and to let her know what that plan of care is.    In summary, we are scheduling her for an intrathecal pump battery removal.  We need to do that as a separate procedure before we reimplant the new system.    Because of her complicated status, I would feel more comfortable with removing her intrathecal pump and allowing that pocket to heal prior to bringing her back for implant of the new pump.  The procedure would be complicated because the explant will need to be done in a supine position to remove the previous pump and seal that pocket, but the implant will need to be done in a prone position.  I think it would be problematic to remove the previous pump and then flip her over to her belly and be lying on that new incision for more surgery, increasing complication risk.    I called in some pain medication for her.

## 2019-08-26 ENCOUNTER — RESULTS ENCOUNTER (OUTPATIENT)
Dept: PAIN MEDICINE | Facility: CLINIC | Age: 54
End: 2019-08-26

## 2019-08-26 ENCOUNTER — TELEPHONE (OUTPATIENT)
Dept: PAIN MEDICINE | Facility: CLINIC | Age: 54
End: 2019-08-26

## 2019-08-26 DIAGNOSIS — Z01.818 PREOP TESTING: ICD-10-CM

## 2019-08-26 NOTE — TELEPHONE ENCOUNTER
----- Message from Horace Rios MD sent at 8/20/2019 11:57 PM EDT -----  Regarding: pump battery explant asap  Case request is in.  It is not an emergency but does need to be done as soon as possible.     Because of her complicated status, I would feel more comfortable with removing her intrathecal pump and allowing that pocket to heal prior to bringing her back for implant of the new pump.  The procedure would be complicated because the explant will need to be done in a supine position to remove the previous pump and seal that pocket, but the implant will need to be done in a prone position.  I think it would be problematic to remove the previous pump and then flip her over to her belly and be lying on that new incision for more surgery, increasing complication risk.    Therefore, the plan will be to explant the intrathecal pump and then we will plan for implantation of a new system later on after  healing.      Patient has been informed of the plan

## 2019-09-23 RX ORDER — HYDROCODONE BITARTRATE AND ACETAMINOPHEN 10; 325 MG/1; MG/1
1 TABLET ORAL EVERY 6 HOURS PRN
Qty: 120 TABLET | Refills: 0 | Status: SHIPPED | OUTPATIENT
Start: 2019-09-23 | End: 2019-10-18 | Stop reason: HOSPADM

## 2019-09-23 NOTE — TELEPHONE ENCOUNTER
Medication Refill Request    Date of phone call: 19    Medication being requested: Norco  mg    si tab po q 6 hrs prn  Qty: 120    Date of last visit: 19    Date of last refill: 19    TERRELL up to date?: yes    Next Follow up?: 19    Any new pertinent information? (i.e, new medication allergies, new use of medications, change in patient's health or condition, non-compliance or inconsistency with prescribing agreement?):

## 2019-09-30 ENCOUNTER — OFFICE VISIT (OUTPATIENT)
Dept: PAIN MEDICINE | Facility: CLINIC | Age: 54
End: 2019-09-30

## 2019-09-30 VITALS
TEMPERATURE: 97.9 F | DIASTOLIC BLOOD PRESSURE: 76 MMHG | HEART RATE: 98 BPM | RESPIRATION RATE: 16 BRPM | HEIGHT: 65 IN | WEIGHT: 166 LBS | BODY MASS INDEX: 27.66 KG/M2 | SYSTOLIC BLOOD PRESSURE: 116 MMHG | OXYGEN SATURATION: 96 %

## 2019-09-30 DIAGNOSIS — G89.29 CHRONIC LOW BACK PAIN WITH BILATERAL SCIATICA, UNSPECIFIED BACK PAIN LATERALITY: ICD-10-CM

## 2019-09-30 DIAGNOSIS — M54.41 CHRONIC LOW BACK PAIN WITH BILATERAL SCIATICA, UNSPECIFIED BACK PAIN LATERALITY: ICD-10-CM

## 2019-09-30 DIAGNOSIS — Z97.8 PRESENCE OF INTRATHECAL PUMP: ICD-10-CM

## 2019-09-30 DIAGNOSIS — M46.1 SACROILIITIS (HCC): ICD-10-CM

## 2019-09-30 DIAGNOSIS — Z46.2 END OF BATTERY LIFE OF INTRATHECAL INFUSION PUMP: ICD-10-CM

## 2019-09-30 DIAGNOSIS — M54.42 CHRONIC LOW BACK PAIN WITH BILATERAL SCIATICA, UNSPECIFIED BACK PAIN LATERALITY: ICD-10-CM

## 2019-09-30 DIAGNOSIS — G89.4 CHRONIC PAIN SYNDROME: Primary | ICD-10-CM

## 2019-09-30 PROCEDURE — 62369 ANAL SP INF PMP W/REPRG&FILL: CPT | Performed by: NURSE PRACTITIONER

## 2019-09-30 PROCEDURE — 99214 OFFICE O/P EST MOD 30 MIN: CPT | Performed by: NURSE PRACTITIONER

## 2019-09-30 RX ORDER — CLINDAMYCIN HYDROCHLORIDE 150 MG/1
CAPSULE ORAL
Refills: 0 | COMMUNITY
Start: 2019-09-09 | End: 2019-10-17

## 2019-10-02 DIAGNOSIS — Z46.2 END OF BATTERY LIFE OF INTRATHECAL INFUSION PUMP: Primary | ICD-10-CM

## 2019-10-16 ENCOUNTER — HOSPITAL ENCOUNTER (OUTPATIENT)
Dept: OTHER | Facility: HOSPITAL | Age: 54
Discharge: HOME | End: 2019-10-16
Attending: INTERNAL MEDICINE

## 2019-10-18 ENCOUNTER — ANESTHESIA EVENT (OUTPATIENT)
Dept: PERIOP | Facility: HOSPITAL | Age: 54
End: 2019-10-18

## 2019-10-18 ENCOUNTER — ANESTHESIA (OUTPATIENT)
Dept: PERIOP | Facility: HOSPITAL | Age: 54
End: 2019-10-18

## 2019-10-18 ENCOUNTER — APPOINTMENT (OUTPATIENT)
Dept: GENERAL RADIOLOGY | Facility: HOSPITAL | Age: 54
End: 2019-10-18

## 2019-10-18 ENCOUNTER — HOSPITAL ENCOUNTER (OUTPATIENT)
Facility: HOSPITAL | Age: 54
Setting detail: HOSPITAL OUTPATIENT SURGERY
Discharge: HOME | End: 2019-10-18
Attending: ANESTHESIOLOGY | Admitting: ANESTHESIOLOGY

## 2019-10-18 VITALS
RESPIRATION RATE: 16 BRPM | BODY MASS INDEX: 26.06 KG/M2 | HEIGHT: 67 IN | DIASTOLIC BLOOD PRESSURE: 77 MMHG | WEIGHT: 166 LBS | TEMPERATURE: 99.2 F | OXYGEN SATURATION: 98 % | SYSTOLIC BLOOD PRESSURE: 113 MMHG | HEART RATE: 100 BPM

## 2019-10-18 LAB
GLUCOSE BLDC GLUCOMTR-MCNC: 163 MG/DL (ref 70–130)
GLUCOSE BLDC GLUCOMTR-MCNC: 170 MG/DL (ref 70–130)

## 2019-10-18 PROCEDURE — 25010000002 VANCOMYCIN 10 G RECONSTITUTED SOLUTION: Performed by: ANESTHESIOLOGY

## 2019-10-18 PROCEDURE — 25010000002 NEOSTIGMINE PER 0.5 MG: Performed by: NURSE ANESTHETIST, CERTIFIED REGISTERED

## 2019-10-18 PROCEDURE — 82962 GLUCOSE BLOOD TEST: CPT

## 2019-10-18 PROCEDURE — 76000 FLUOROSCOPY <1 HR PHYS/QHP: CPT

## 2019-10-18 PROCEDURE — 25010000002 PROPOFOL 10 MG/ML EMULSION: Performed by: NURSE ANESTHETIST, CERTIFIED REGISTERED

## 2019-10-18 PROCEDURE — 25010000002 HYDROMORPHONE 1 MG/ML SOLUTION: Performed by: ANESTHESIOLOGY

## 2019-10-18 PROCEDURE — 62362 IMPLANT SPINE INFUSION PUMP: CPT | Performed by: ANESTHESIOLOGY

## 2019-10-18 PROCEDURE — C1755 CATHETER, INTRASPINAL: HCPCS | Performed by: ANESTHESIOLOGY

## 2019-10-18 PROCEDURE — C1787 PATIENT PROGR, NEUROSTIM: HCPCS | Performed by: ANESTHESIOLOGY

## 2019-10-18 PROCEDURE — 25010000002 FENTANYL CITRATE (PF) 100 MCG/2ML SOLUTION: Performed by: NURSE ANESTHETIST, CERTIFIED REGISTERED

## 2019-10-18 PROCEDURE — 72100 X-RAY EXAM L-S SPINE 2/3 VWS: CPT

## 2019-10-18 PROCEDURE — 25010000002 MIDAZOLAM PER 1 MG: Performed by: ANESTHESIOLOGY

## 2019-10-18 PROCEDURE — C1772 INFUSION PUMP, PROGRAMMABLE: HCPCS | Performed by: ANESTHESIOLOGY

## 2019-10-18 PROCEDURE — 71045 X-RAY EXAM CHEST 1 VIEW: CPT

## 2019-10-18 PROCEDURE — 62369 ANAL SP INF PMP W/REPRG&FILL: CPT | Performed by: ANESTHESIOLOGY

## 2019-10-18 PROCEDURE — 25010000002 SUCCINYLCHOLINE PER 20 MG: Performed by: NURSE ANESTHETIST, CERTIFIED REGISTERED

## 2019-10-18 PROCEDURE — 62350 IMPLANT SPINAL CANAL CATH: CPT | Performed by: ANESTHESIOLOGY

## 2019-10-18 PROCEDURE — 25010000002 ONDANSETRON PER 1 MG: Performed by: ANESTHESIOLOGY

## 2019-10-18 PROCEDURE — 25010000002 FENTANYL CITRATE (PF) 100 MCG/2ML SOLUTION: Performed by: ANESTHESIOLOGY

## 2019-10-18 DEVICE — PUMP NEURO PROGRAM SYNCHROMED2 FLTR 40ML: Type: IMPLANTABLE DEVICE | Site: BACK | Status: FUNCTIONAL

## 2019-10-18 DEVICE — CATH INTRATHCL ASCENDA SHRT 1PC16G114.3: Type: IMPLANTABLE DEVICE | Site: BACK | Status: FUNCTIONAL

## 2019-10-18 RX ORDER — SODIUM CHLORIDE, SODIUM LACTATE, POTASSIUM CHLORIDE, CALCIUM CHLORIDE 600; 310; 30; 20 MG/100ML; MG/100ML; MG/100ML; MG/100ML
9 INJECTION, SOLUTION INTRAVENOUS CONTINUOUS
Status: DISCONTINUED | OUTPATIENT
Start: 2019-10-18 | End: 2019-10-18 | Stop reason: HOSPADM

## 2019-10-18 RX ORDER — PROPOFOL 10 MG/ML
VIAL (ML) INTRAVENOUS AS NEEDED
Status: DISCONTINUED | OUTPATIENT
Start: 2019-10-18 | End: 2019-10-18 | Stop reason: SURG

## 2019-10-18 RX ORDER — BUPIVACAINE HYDROCHLORIDE AND EPINEPHRINE 5; 5 MG/ML; UG/ML
INJECTION, SOLUTION EPIDURAL; INTRACAUDAL; PERINEURAL AS NEEDED
Status: DISCONTINUED | OUTPATIENT
Start: 2019-10-18 | End: 2019-10-18 | Stop reason: HOSPADM

## 2019-10-18 RX ORDER — METHYLPREDNISOLONE 4 MG/1
4 TABLET ORAL TAKE AS DIRECTED
COMMUNITY
End: 2019-10-31

## 2019-10-18 RX ORDER — SUCCINYLCHOLINE CHLORIDE 20 MG/ML
INJECTION INTRAMUSCULAR; INTRAVENOUS AS NEEDED
Status: DISCONTINUED | OUTPATIENT
Start: 2019-10-18 | End: 2019-10-18 | Stop reason: SURG

## 2019-10-18 RX ORDER — GLYCOPYRROLATE 0.2 MG/ML
INJECTION INTRAMUSCULAR; INTRAVENOUS AS NEEDED
Status: DISCONTINUED | OUTPATIENT
Start: 2019-10-18 | End: 2019-10-18 | Stop reason: SURG

## 2019-10-18 RX ORDER — FAMOTIDINE 10 MG/ML
20 INJECTION, SOLUTION INTRAVENOUS ONCE
Status: COMPLETED | OUTPATIENT
Start: 2019-10-18 | End: 2019-10-18

## 2019-10-18 RX ORDER — MIDAZOLAM HYDROCHLORIDE 1 MG/ML
2 INJECTION INTRAMUSCULAR; INTRAVENOUS
Status: DISCONTINUED | OUTPATIENT
Start: 2019-10-18 | End: 2019-10-18 | Stop reason: HOSPADM

## 2019-10-18 RX ORDER — BUPIVACAINE HYDROCHLORIDE 5 MG/ML
INJECTION, SOLUTION PERINEURAL AS NEEDED
Status: DISCONTINUED | OUTPATIENT
Start: 2019-10-18 | End: 2019-10-18 | Stop reason: HOSPADM

## 2019-10-18 RX ORDER — ACETAMINOPHEN 500 MG
TABLET ORAL EVERY 6 HOURS PRN
COMMUNITY
End: 2022-12-28 | Stop reason: SDUPTHER

## 2019-10-18 RX ORDER — LIDOCAINE HYDROCHLORIDE 20 MG/ML
INJECTION, SOLUTION INFILTRATION; PERINEURAL AS NEEDED
Status: DISCONTINUED | OUTPATIENT
Start: 2019-10-18 | End: 2019-10-18 | Stop reason: SURG

## 2019-10-18 RX ORDER — HYDRALAZINE HYDROCHLORIDE 20 MG/ML
5 INJECTION INTRAMUSCULAR; INTRAVENOUS
Status: DISCONTINUED | OUTPATIENT
Start: 2019-10-18 | End: 2019-10-18 | Stop reason: HOSPADM

## 2019-10-18 RX ORDER — OXYCODONE AND ACETAMINOPHEN 7.5; 325 MG/1; MG/1
1 TABLET ORAL ONCE AS NEEDED
Status: DISCONTINUED | OUTPATIENT
Start: 2019-10-18 | End: 2019-10-18 | Stop reason: HOSPADM

## 2019-10-18 RX ORDER — FENTANYL CITRATE 50 UG/ML
50 INJECTION, SOLUTION INTRAMUSCULAR; INTRAVENOUS
Status: DISCONTINUED | OUTPATIENT
Start: 2019-10-18 | End: 2019-10-18 | Stop reason: HOSPADM

## 2019-10-18 RX ORDER — HYDROMORPHONE HYDROCHLORIDE 1 MG/ML
0.5 INJECTION, SOLUTION INTRAMUSCULAR; INTRAVENOUS; SUBCUTANEOUS
Status: DISCONTINUED | OUTPATIENT
Start: 2019-10-18 | End: 2019-10-18 | Stop reason: HOSPADM

## 2019-10-18 RX ORDER — FLUMAZENIL 0.1 MG/ML
0.2 INJECTION INTRAVENOUS AS NEEDED
Status: DISCONTINUED | OUTPATIENT
Start: 2019-10-18 | End: 2019-10-18 | Stop reason: HOSPADM

## 2019-10-18 RX ORDER — DIPHENHYDRAMINE HCL 25 MG
25 CAPSULE ORAL
Status: DISCONTINUED | OUTPATIENT
Start: 2019-10-18 | End: 2019-10-18 | Stop reason: HOSPADM

## 2019-10-18 RX ORDER — LABETALOL HYDROCHLORIDE 5 MG/ML
5 INJECTION, SOLUTION INTRAVENOUS
Status: DISCONTINUED | OUTPATIENT
Start: 2019-10-18 | End: 2019-10-18 | Stop reason: HOSPADM

## 2019-10-18 RX ORDER — ALBUTEROL SULFATE 2.5 MG/3ML
2.5 SOLUTION RESPIRATORY (INHALATION) ONCE AS NEEDED
Status: DISCONTINUED | OUTPATIENT
Start: 2019-10-18 | End: 2019-10-18 | Stop reason: HOSPADM

## 2019-10-18 RX ORDER — HYDROCODONE BITARTRATE AND ACETAMINOPHEN 7.5; 325 MG/1; MG/1
1 TABLET ORAL ONCE AS NEEDED
Status: COMPLETED | OUTPATIENT
Start: 2019-10-18 | End: 2019-10-18

## 2019-10-18 RX ORDER — EPHEDRINE SULFATE 50 MG/ML
5 INJECTION, SOLUTION INTRAVENOUS ONCE AS NEEDED
Status: DISCONTINUED | OUTPATIENT
Start: 2019-10-18 | End: 2019-10-18 | Stop reason: HOSPADM

## 2019-10-18 RX ORDER — ONDANSETRON 2 MG/ML
4 INJECTION INTRAMUSCULAR; INTRAVENOUS ONCE
Status: COMPLETED | OUTPATIENT
Start: 2019-10-18 | End: 2019-10-18

## 2019-10-18 RX ORDER — ACETAMINOPHEN 325 MG/1
650 TABLET ORAL ONCE AS NEEDED
Status: DISCONTINUED | OUTPATIENT
Start: 2019-10-18 | End: 2019-10-18 | Stop reason: HOSPADM

## 2019-10-18 RX ORDER — ROCURONIUM BROMIDE 10 MG/ML
INJECTION, SOLUTION INTRAVENOUS AS NEEDED
Status: DISCONTINUED | OUTPATIENT
Start: 2019-10-18 | End: 2019-10-18 | Stop reason: SURG

## 2019-10-18 RX ORDER — LIDOCAINE HYDROCHLORIDE 10 MG/ML
0.5 INJECTION, SOLUTION EPIDURAL; INFILTRATION; INTRACAUDAL; PERINEURAL ONCE AS NEEDED
Status: DISCONTINUED | OUTPATIENT
Start: 2019-10-18 | End: 2019-10-18 | Stop reason: HOSPADM

## 2019-10-18 RX ORDER — SODIUM CHLORIDE 0.9 % (FLUSH) 0.9 %
10 SYRINGE (ML) INJECTION AS NEEDED
Status: DISCONTINUED | OUTPATIENT
Start: 2019-10-18 | End: 2019-10-18 | Stop reason: HOSPADM

## 2019-10-18 RX ORDER — SODIUM CHLORIDE 0.9 % (FLUSH) 0.9 %
3-10 SYRINGE (ML) INJECTION AS NEEDED
Status: DISCONTINUED | OUTPATIENT
Start: 2019-10-18 | End: 2019-10-18 | Stop reason: HOSPADM

## 2019-10-18 RX ORDER — MIDAZOLAM HYDROCHLORIDE 1 MG/ML
1 INJECTION INTRAMUSCULAR; INTRAVENOUS
Status: DISCONTINUED | OUTPATIENT
Start: 2019-10-18 | End: 2019-10-18 | Stop reason: HOSPADM

## 2019-10-18 RX ORDER — NALOXONE HCL 0.4 MG/ML
0.2 VIAL (ML) INJECTION AS NEEDED
Status: DISCONTINUED | OUTPATIENT
Start: 2019-10-18 | End: 2019-10-18 | Stop reason: HOSPADM

## 2019-10-18 RX ORDER — SODIUM CHLORIDE 0.9 % (FLUSH) 0.9 %
3 SYRINGE (ML) INJECTION EVERY 12 HOURS SCHEDULED
Status: DISCONTINUED | OUTPATIENT
Start: 2019-10-18 | End: 2019-10-18 | Stop reason: HOSPADM

## 2019-10-18 RX ORDER — DIPHENHYDRAMINE HYDROCHLORIDE 50 MG/ML
12.5 INJECTION INTRAMUSCULAR; INTRAVENOUS
Status: DISCONTINUED | OUTPATIENT
Start: 2019-10-18 | End: 2019-10-18 | Stop reason: HOSPADM

## 2019-10-18 RX ORDER — ONDANSETRON 2 MG/ML
4 INJECTION INTRAMUSCULAR; INTRAVENOUS ONCE AS NEEDED
Status: DISCONTINUED | OUTPATIENT
Start: 2019-10-18 | End: 2019-10-18 | Stop reason: HOSPADM

## 2019-10-18 RX ADMIN — FAMOTIDINE 20 MG: 10 INJECTION INTRAVENOUS at 12:15

## 2019-10-18 RX ADMIN — HYDROMORPHONE HYDROCHLORIDE 1 MG: 1 INJECTION, SOLUTION INTRAMUSCULAR; INTRAVENOUS; SUBCUTANEOUS at 12:15

## 2019-10-18 RX ADMIN — HYDROCODONE BITARTRATE AND ACETAMINOPHEN 1 TABLET: 7.5; 325 TABLET ORAL at 15:45

## 2019-10-18 RX ADMIN — FENTANYL CITRATE 100 MCG: 50 INJECTION INTRAMUSCULAR; INTRAVENOUS at 13:05

## 2019-10-18 RX ADMIN — GLYCOPYRROLATE 0.2 MG: 0.2 INJECTION INTRAMUSCULAR; INTRAVENOUS at 15:01

## 2019-10-18 RX ADMIN — ROCURONIUM BROMIDE 45 MG: 10 INJECTION INTRAVENOUS at 13:20

## 2019-10-18 RX ADMIN — SODIUM CHLORIDE, POTASSIUM CHLORIDE, SODIUM LACTATE AND CALCIUM CHLORIDE 9 ML/HR: 600; 310; 30; 20 INJECTION, SOLUTION INTRAVENOUS at 12:15

## 2019-10-18 RX ADMIN — SUCCINYLCHOLINE CHLORIDE 100 MG: 20 INJECTION, SOLUTION INTRAMUSCULAR; INTRAVENOUS; PARENTERAL at 13:09

## 2019-10-18 RX ADMIN — MIDAZOLAM 1 MG: 1 INJECTION INTRAMUSCULAR; INTRAVENOUS at 12:15

## 2019-10-18 RX ADMIN — GLYCOPYRROLATE 0.2 MG: 0.2 INJECTION INTRAMUSCULAR; INTRAVENOUS at 13:07

## 2019-10-18 RX ADMIN — FENTANYL CITRATE 50 MCG: 50 INJECTION, SOLUTION INTRAMUSCULAR; INTRAVENOUS at 15:43

## 2019-10-18 RX ADMIN — FENTANYL CITRATE 50 MCG: 50 INJECTION INTRAMUSCULAR; INTRAVENOUS at 14:19

## 2019-10-18 RX ADMIN — VANCOMYCIN HYDROCHLORIDE 1250 MG: 10 INJECTION, POWDER, LYOPHILIZED, FOR SOLUTION INTRAVENOUS at 12:34

## 2019-10-18 RX ADMIN — ROCURONIUM BROMIDE 5 MG: 10 INJECTION INTRAVENOUS at 13:09

## 2019-10-18 RX ADMIN — LIDOCAINE HYDROCHLORIDE 100 MG: 20 INJECTION, SOLUTION INFILTRATION; PERINEURAL at 13:09

## 2019-10-18 RX ADMIN — NEOSTIGMINE METHYLSULFATE 2 MG: 1 INJECTION INTRAMUSCULAR; INTRAVENOUS; SUBCUTANEOUS at 14:47

## 2019-10-18 RX ADMIN — ONDANSETRON 4 MG: 2 INJECTION INTRAMUSCULAR; INTRAVENOUS at 12:20

## 2019-10-18 RX ADMIN — PROPOFOL 150 MG: 10 INJECTION, EMULSION INTRAVENOUS at 13:09

## 2019-10-18 RX ADMIN — ROCURONIUM BROMIDE 15 MG: 10 INJECTION INTRAVENOUS at 14:20

## 2019-10-18 NOTE — PERIOPERATIVE NURSING NOTE
Dr. Rios here and aware of pt's c/o 10/10 pain upon arrival to preop, uncontrolled incontinent liquid green stool, recent vomiting liquid green emesis. Anesthesia gave patient Zofran, Pepcid, Dilaudid and Versed. MD aware pt had reported to preop RN ran out of narcotics and pain pump stopped working yesterday, withdrawing from pain meds. MRSA swab pending per Dr. Schulz's office. MD ok to proceed with surgery.

## 2019-10-18 NOTE — OP NOTE
Intrathecal Drug Delivery System Implant  [facility]      Preop Dx: End of battery life of intrathecal pump system  Postop Dx: Same as preop dx    Neuromodulation System: Medtronic  Product Implanted:   Synchromed II    Catheter...   entering into the L1-2 interspace, advanced to a point with the distal tip at the T9 vertebral level, in the intrathecal space    Pump pocket site: Right Lumbar Flank    Preprocedure Discussion with Patient:  Risks and complications were discussed with the patient prior to starting the procedure and informed consent was obtained.  We discussed various topics including but not limited to bleeding, infection, injury, allergic reactions, spinal cord and/or neural injury, implant site pain, post procedural site soreness, equipment malfunction including but not limited to catheter fracture or migration or risk of high or low levels of medication delivery, risk of overdose & coma & death, risk of the lack of pain relief, and risk of worsening clinical picture.    Surgeon:     Horace Rios MD    Reason for procedure:  Battery ran out yesterday.  Previous catheter kink.      Sedation:  General Anesthesia with ETT  Antibiotics:   Vancomycin 1g IV (for patient <80 kg)     Description of Procedure:    After obtaining informed consent, IV access had been obtained by nursing staff in the preoperative area.  The patient was transported to the operative suite and was placed in a prone position.  Appropriate padding was placed.  Anesthesia care and cardiopulmonary monitoring maintained by member of the anesthesiology team throughout the procedure.  Perioperative antibodies were given prior to incision per routine as indicated in the anesthesia record and indicated above.  The patient's spine was cleansed appropriately with routine cleansing, and then prepped in a sterile routine fashion.  The area was then draped in sterile routine fashion.       The midline of the patient's spine was  identified and marked.  The skin and subcutaneous tissue were anesthetized with appropriate amounts of local anesthetic solution.  A midline incision was made starting about 1 vertebral level below the intended interspace as noted above, and extended 1 1/2-2 inches caudad.  The incision was extended down to the lumbodorsal fascia with judicious use of electrocautery.  The entire area was irrigated with copious amounts of solution.      Needle entry site was about 1 1/2-2 vertebral levels below the intended interlaminar space for epidural access.  The entry point was medial to the pedicles, and inserted at acute angles of less than 45° and in to the interlaminar space noted above.  Loss-of-resistance technique was utilized to identify entry into the epidural space.  Aspiration was negative.  The catheter was readied, and then the needle was advanced 1-2 more millimeters to puncture the dura.  The catheter was inserted in the needle as noted above to the aforementioned target.  Lateral fluoroscopic view was utilized to confirm proper placement in the intrathecal space.    The needle was removed slowly from the insertion site with care not to advance to retract catheter tip position.  AP fluoroscopic imaging was checked sequentially to confirm no gross changes in position had occurred.  The catheter was secured to the underlying tissue using a proprietary locking anchor and nonabsorbable sutures.  Once more in AP fluoroscopic view was checked to confirm final lead placement after anchoring was completed.  The incision site was irrigated with copious amounts of irrigant solution x2.    As noted above, the pump pocket site was identified.  This line was marked in a perpendicular fashion on the skin and then the area along the line was anesthetized with copious amounts of local anesthetic, and also anesthetized caudad and cephalad to that line.  Also was anesthetized a line connecting the medial aspect of the IPG pocket  to the inferior aspect of the midline incision.  The incision was made to about 1 cm in depth and then undermined caudad and cephalad to create a subcutaneous pocket at a depth of about 3/4 cm.  Electrocautery was used judiciously to control bleeding, and the pocket was then irrigated with copious amounts of irrigant solution ×2.    The pump was primed per routine and prepared on the back table, observing strict sterile technique upon filling the pump.    The catheter was tunneled from the midline incision to the nearest aspect of the pump pocket using the proprietary tunneling device.   The catheter was trimmed as appropriate and the catheter length was noted in the pump programming.   The catheter was connected to the pump in routine fashion using the locking pump catheter extension, and locked in place.  Catheter function was tested with positive aspiration from the side port confirming free CSF flow, and was noted to be functioning appropriately.  Excess catheter lead length was coiled beneath the pump battery and the pump battery was placed into the pocket.  The skin and subcutaneous tissues were easily opposable without any stress or duress on the overlying tissues.    The pump pocket incision was closed with 2-0 Vicryl interrupted sutures for the underlying layer and Stapling Device for Skin Closure.  The midline incision was closed with 2-0 Vicryl interrupted sutures for the deep layer and Stapling Device for Skin Closure.  The incisions were dressed with Telfa and Tegaderm.        Estimated Blood Loss: minimal  Specimens:   none  Complications:  No complications were noted.    Tolerance and discharge condition:    The patient tolerated the procedure well and was awakened from anesthesia without incident.  The patient was transported to the recovery area without difficulties.  Further device testing and programming was performed by the device representative in the recovery area as necessary.  The patient was  again cautioned not to drive at this time and avoid strenuous activities and to avoid reaching overhead and to avoid bending and avoid lifting objects over 5 pounds.  The patient was discharged home under the care of family members in stable and satisfactory condition.      Plan of care:    The patient was given our standard instruction sheet and will resume all medications as per the medication reconciliation sheet.  The patient will return to my office at the appropriate time scheduled in about one business day with the  device representative for further device programming and education about device function if necessary.  The patient will also present to my office in 10-14 days for a postoperative wound check.    The patient understands that there is any signs of complication, bleeding, infection, fever, chills, increasing back pain or spine pain, any neurologic deficit, or any other concerning finding, that the patient of a family member should call my office at [phone] at any time to access the answering service.      INITIAL DEVICE SETTINGS:  This is a 40 mL pump.  Serial number is [id number].  Catheter lot number is [id number].  Again the catheter tip is at T8-T9.  The implanted length of catheter is 61.3 cm.  The intrathecal pump is filled with 39 mL of hydromorphone at 5 mg/mL along with bupivacaine at 0.25 mg/mL.  The daily dose infusion is set to 2.5 mg/day.  PTM dosing is set to 0.4 mg of hydromorphone, with a 3-hour lockout maximum 5 activations a day.  Refill date at this time is estimated at November 28, 2019.

## 2019-10-18 NOTE — BRIEF OP NOTE
PAIN PUMP INSERTION  Procedure Note    Isha Llanes  10/18/2019    Pre-op Diagnosis:   End of battery life of intrathecal infusion pump    Post-op Diagnosis:     Post-Op Diagnosis Codes:     * End of battery life of intrathecal infusion pump [Z46.2]    Procedure(s):  PAIN PUMP INSERTION [facility] 10-18-19 PEDRO    Surgeon(s):  Horace Rios MD    Anesthesia: Choice    Staff:   Circulator: Jennifer Jackson RN  Radiology Technologist: Diamond Wood RRT  Scrub Person: Kavitha Chapin    Estimated Blood Loss: minimal    Specimens:                * No orders in the log *      Drains:  no    Findings: Placement of intrathecal catheter per routine without complications and placement of the new intrathecal drug infusion system pump in the right lumbar flank without complication    Complications: Now      Horace Rios MD     Date: 10/18/2019  Time: 3:23 PM

## 2019-10-18 NOTE — ANESTHESIA PROCEDURE NOTES
Airway  Urgency: elective    Date/Time: 10/18/2019 1:13 PM  Airway not difficult    General Information and Staff    Patient location during procedure: OR  Anesthesiologist: Kelly Zelaya MD  CRNA: Des Melara CRNA    Indications and Patient Condition  Indications for airway management: airway protection    Preoxygenated: yes  MILS maintained throughout  Mask difficulty assessment: 1 - vent by mask    Final Airway Details  Final airway type: endotracheal airway      Successful airway: ETT  Cuffed: yes   Successful intubation technique: direct laryngoscopy  Endotracheal tube insertion site: oral  Blade: Lizzette  Blade size: 3  ETT size (mm): 7.0  Cormack-Lehane Classification: grade I - full view of glottis  Placement verified by: chest auscultation and capnometry   Inital cuff pressure (cm H2O): 22  Cuff volume (mL): 7  Measured from: lips  ETT/EBT  to lips (cm): 21  Number of attempts at approach: 1

## 2019-10-18 NOTE — ANESTHESIA PREPROCEDURE EVALUATION
Anesthesia Evaluation                  Airway   Mallampati: II  TM distance: >3 FB  Neck ROM: full  No difficulty expected  Dental    (+) edentulous    Pulmonary - normal exam   (+) pulmonary embolism, a smoker Current, asthma, shortness of breath,   Cardiovascular     (+) valvular problems/murmurs AS, murmur,     ROS comment: Valvuloplasty    Neuro/Psych  (+) numbness,     GI/Hepatic/Renal/Endo    (+)  hiatal hernia, GERD,  diabetes mellitus type 2,     Musculoskeletal     (+) back pain, chronic pain,   Abdominal    Substance History      OB/GYN          Other   (+) arthritis   history of cancer remission                    Anesthesia Plan    ASA 3     general     intravenous induction   Anesthetic plan, all risks, benefits, and alternatives have been provided, discussed and informed consent has been obtained with: patient.

## 2019-10-18 NOTE — INTERVAL H&P NOTE
H&P reviewed. The patient was examined and there are no changes to the H&P.   The plan is in fact for a pump placement.  The previous pump will be left in place for now, as to not have to reposition the patient in surgery and avoid that risk.  We will plan to remove the old pain pump battery in the future, but as for now we need to restore some therapy for her.

## 2019-10-18 NOTE — ANESTHESIA POSTPROCEDURE EVALUATION
"Patient: Isha Llanes    Procedure Summary     Date:  10/18/19 Room / Location:  [address] MAIN OR    Anesthesia Start:  1304 Anesthesia Stop:  1521    Procedure:  PAIN PUMP INSERTION [facility] 10-18-19 PEDRO (N/A ) Diagnosis:       End of battery life of intrathecal infusion pump      (End of battery life of intrathecal infusion pump [Z46.2])    Surgeon:  Horace Rios MD Provider:  Kelly Zelaya MD    Anesthesia Type:  general ASA Status:  3          Anesthesia Type: general  Last vitals  BP   104/77 (10/18/19 1640)   Temp   37.3 °C (99.2 °F) (10/18/19 1518)   Pulse   102 (10/18/19 1640)   Resp   16 (10/18/19 1640)     SpO2   92 % (10/18/19 1640)     Post Anesthesia Care and Evaluation    Patient location during evaluation: PHASE II  Patient participation: complete - patient participated  Level of consciousness: awake and alert  Pain management: adequate  Airway patency: patent  Anesthetic complications: No anesthetic complications    Cardiovascular status: acceptable  Respiratory status: acceptable  Hydration status: acceptable    Comments: /77 (BP Location: Left arm)   Pulse 102   Temp 37.3 °C (99.2 °F) (Oral)   Resp 16   Ht 170.2 cm (67\")   Wt 75.3 kg (166 lb)   SpO2 92%   BMI 26.00 kg/m²         "

## 2019-10-18 NOTE — DISCHARGE INSTRUCTIONS
May remove gauze dressing tomorrow.  Always wear a clean tshirt over the incisions' areas.  Shower ok tomorrow.  Blot the area with soap & rinse; do not scrub the staples/incisions however.  Plan to remove staples in 12-14 days.   If any oozing today, just re-dress with some gauze and paper tape.    You had a pain pill at 3:45 PM

## 2019-10-19 LAB — MRSA SPEC QL CULT: NORMAL

## 2019-10-21 RX ORDER — ONDANSETRON 4 MG/1
4-8 TABLET, ORALLY DISINTEGRATING ORAL EVERY 8 HOURS PRN
Qty: 60 TABLET | Refills: 0 | Status: SHIPPED | OUTPATIENT
Start: 2019-10-21 | End: 2020-11-20

## 2019-10-24 ENCOUNTER — TELEPHONE (OUTPATIENT)
Dept: PAIN MEDICINE | Facility: CLINIC | Age: 54
End: 2019-10-24

## 2019-10-24 RX ORDER — NALOXONE HYDROCHLORIDE 4 MG/.1ML
1 SPRAY NASAL AS NEEDED
Qty: 1 EACH | Refills: 0 | Status: SHIPPED | OUTPATIENT
Start: 2019-10-24 | End: 2020-11-20

## 2019-10-24 NOTE — TELEPHONE ENCOUNTER
Pt called in today stating she used 5 boluses of her pain pump but is still in severe pain. She would like to know if you can prescribe her some hydrocodone to manage her pain? Please advise.  Thank you.

## 2019-10-25 NOTE — TELEPHONE ENCOUNTER
I am not sure which Naloxone on the list is Narcan.  Could you research that ?  Also, no hydrocodone any more.  She can come in for reprogramming.

## 2019-10-31 ENCOUNTER — OFFICE VISIT (OUTPATIENT)
Dept: PAIN MEDICINE | Facility: CLINIC | Age: 54
End: 2019-10-31

## 2019-10-31 VITALS
DIASTOLIC BLOOD PRESSURE: 71 MMHG | TEMPERATURE: 98 F | HEART RATE: 96 BPM | BODY MASS INDEX: 26.06 KG/M2 | RESPIRATION RATE: 16 BRPM | WEIGHT: 166 LBS | HEIGHT: 67 IN | SYSTOLIC BLOOD PRESSURE: 113 MMHG | OXYGEN SATURATION: 96 %

## 2019-10-31 DIAGNOSIS — G89.29 CHRONIC LOW BACK PAIN WITH BILATERAL SCIATICA, UNSPECIFIED BACK PAIN LATERALITY: ICD-10-CM

## 2019-10-31 DIAGNOSIS — G89.4 CHRONIC PAIN SYNDROME: Primary | ICD-10-CM

## 2019-10-31 DIAGNOSIS — M54.42 CHRONIC LOW BACK PAIN WITH BILATERAL SCIATICA, UNSPECIFIED BACK PAIN LATERALITY: ICD-10-CM

## 2019-10-31 DIAGNOSIS — Z97.8 PRESENCE OF INTRATHECAL PUMP: ICD-10-CM

## 2019-10-31 DIAGNOSIS — M46.1 SACROILIAC INFLAMMATION (HCC): ICD-10-CM

## 2019-10-31 DIAGNOSIS — M54.41 CHRONIC LOW BACK PAIN WITH BILATERAL SCIATICA, UNSPECIFIED BACK PAIN LATERALITY: ICD-10-CM

## 2019-10-31 PROBLEM — M53.3 SI (SACROILIAC) JOINT DYSFUNCTION: Status: RESOLVED | Noted: 2018-01-17 | Resolved: 2019-10-31

## 2019-10-31 PROBLEM — Z46.2 END OF BATTERY LIFE OF INTRATHECAL INFUSION PUMP: Status: RESOLVED | Noted: 2019-05-31 | Resolved: 2019-10-31

## 2019-10-31 PROCEDURE — 99024 POSTOP FOLLOW-UP VISIT: CPT | Performed by: NURSE PRACTITIONER

## 2019-10-31 RX ORDER — BISMUTH SUBSALICYLATE 525 MG/15ML
SUSPENSION ORAL
Refills: 0 | Status: ON HOLD | COMMUNITY
Start: 2019-10-28 | End: 2020-11-23

## 2019-10-31 NOTE — PROGRESS NOTES
CHIEF COMPLAINT  F/U back pain- SCS Implantation/Revision- patient states that her pain has remianed the same. She states that sh ehas been able to sleep for a couple more hours. She is having pain in her sacrum.     Subjective   Isha Llanes is a 53 y.o. female  who presents to the office for follow-up of procedure.  She completed a SCS implantation/revision   on  10-18-19 performed by Dr. VASQUEZ for management of END OF PUMP LIFE. Patient reports variable relief from the procedure.     Complains of pain in her low back. Today her pain is 7/10VAS. Describes her pain as continuous throbbing and pressure. Pain increases with prolonged sitting, standing and activity; pain decreases with medication, rest and changing position. COntinues with IT pump. COmbination of Dilaudid 5 mg/ml and ROpivicaine 0.2 mg/ml. Daily dose is 2.5 mg/day of Dilaudid with addiction of PTM. Her PTM is set at 0.4 mg to be given over 5 minutes with a 3 hour lockout and a maximum activation of 5 times in 24 hours. She has used this 57 times since implant on 10-18-19. Denies any side effects from the regimen. Overall, she states she is feeling better. Is wanting to re-start PT but is worried current pain level won't be controlled. Is asking for increase today. ADL's by self. Denies any bowel or bladder change. Denies any fever, chills, headache, SOA.    Back Pain   This is a chronic problem. The current episode started more than 1 year ago. The problem has been gradually worsening since onset. The pain is present in the lumbar spine. The quality of the pain is described as aching and stabbing. The pain is at a severity of 7/10. The symptoms are aggravated by bending, sitting, standing, twisting and position. Associated symptoms include numbness (legs and feet bilaterally), pelvic pain (to see oncologist) and weakness (legs bilat.). Pertinent negatives include no abdominal pain, chest pain, dysuria, fever or headaches. Risk factors include  "lack of exercise, poor posture, sedentary lifestyle and history of cancer. She has tried analgesics, bed rest, heat and ice for the symptoms. The treatment provided no relief.        The following portions of the patient's history were reviewed and updated as appropriate: allergies, current medications, past family history, past medical history, past social history, past surgical history and problem list.    Review of Systems   Constitutional: Positive for activity change (decreased). Negative for chills and fever.   HENT: Negative for ear pain.    Eyes: Negative for pain.   Respiratory: Negative for chest tightness and wheezing.    Cardiovascular: Positive for leg swelling. Negative for chest pain.   Gastrointestinal: Negative for abdominal pain, blood in stool, nausea and vomiting.   Genitourinary: Positive for pelvic pain (to see oncologist). Negative for dysuria.   Musculoskeletal: Positive for arthralgias, back pain, gait problem and neck pain.   Allergic/Immunologic: Negative for immunocompromised state.   Neurological: Positive for weakness (legs bilat.) and numbness (legs and feet bilaterally). Negative for dizziness and headaches.   Hematological: Does not bruise/bleed easily.   Psychiatric/Behavioral: Positive for agitation and sleep disturbance.       Vitals:    10/31/19 1216   BP: 113/71   Pulse: 96   Resp: 16   Temp: 98 °F (36.7 °C)   SpO2: 96%   Weight: 75.3 kg (166 lb)   Height: 170.2 cm (67\")   PainSc:   7   PainLoc: Back     Objective   Physical Exam   Constitutional: She is oriented to person, place, and time. Vital signs are normal. She appears well-developed and well-nourished. She is cooperative.   HENT:   Head: Normocephalic and atraumatic.   Nose: Nose normal.   Eyes: Conjunctivae and lids are normal.   Cardiovascular: Normal rate, regular rhythm and normal heart sounds.   Pulmonary/Chest: Effort normal and breath sounds normal. No respiratory distress.   Abdominal: Normal appearance. "   Neurological: She is alert and oriented to person, place, and time. Gait (motorized wheelchair) abnormal.   Skin: Skin is warm and dry.        Psychiatric: She has a normal mood and affect. Her speech is normal and behavior is normal. Judgment and thought content normal. Cognition and memory are normal.   Nursing note and vitals reviewed.      Assessment/Plan   Isha was seen today for back pain.    Diagnoses and all orders for this visit:    Chronic pain syndrome    Chronic low back pain with bilateral sciatica, unspecified back pain laterality    Sacroiliac inflammation (CMS/HCC)    Presence of intrathecal pump      ---  Reviewed signs and symptoms of infection, including but not limited to-- fever, chills, stiff neck, headache, flu-like symptoms, increased tenderness and redness to incisions, as well as drainage or swelling. If patient notices this, she is to call office immediately. Patient verbalized understanding.  --- Increase pump to 3.0 mg/day with basal rate. No changes to PTM rate.   --- Continue with PT.  --- Follow-up for pump refill and wound check in 4 weeks     TERRELL REPORT    As part of the patient's treatment plan, I am prescribing controlled substances. The patient has been made aware of appropriate use of such medications, including potential risk of somnolence, limited ability to drive and/or work safely, and the potential for dependence or overdose. It has also bee made clear that these medications are for use by this patient only, without concomitant use of alcohol or other substances unless prescribed.     Patient has completed prescribing agreement detailing terms of continued prescribing of controlled substances, including monitoring TERRELL reports, urine drug screening, and pill counts if necessary. The patient is aware that inappropriate use will results in cessation of prescribing such medications.    TERRELL report has been reviewed and scanned into the patient's chart.    As the  clinician, I personally reviewed the TERRELL from 10-30-19 while the patient was in the office today.    History and physical exam exhibit continued safe and appropriate use of controlled substances.       EMR Dragon/Transcription disclaimer:   Much of this encounter note is an electronic transcription/translation of spoken language to printed text. The electronic translation of spoken language may permit erroneous, or at times, nonsensical words or phrases to be inadvertently transcribed; Although I have reviewed the note for such errors, some may still exist.

## 2019-11-25 ENCOUNTER — OFFICE VISIT (OUTPATIENT)
Dept: PAIN MEDICINE | Facility: CLINIC | Age: 54
End: 2019-11-25

## 2019-11-25 VITALS
HEART RATE: 86 BPM | BODY MASS INDEX: 26.06 KG/M2 | TEMPERATURE: 98.6 F | DIASTOLIC BLOOD PRESSURE: 67 MMHG | SYSTOLIC BLOOD PRESSURE: 106 MMHG | RESPIRATION RATE: 20 BRPM | HEIGHT: 67 IN | WEIGHT: 166 LBS | OXYGEN SATURATION: 96 %

## 2019-11-25 DIAGNOSIS — G89.4 CHRONIC PAIN SYNDROME: Primary | ICD-10-CM

## 2019-11-25 DIAGNOSIS — Z97.8 PRESENCE OF INTRATHECAL PUMP: ICD-10-CM

## 2019-11-25 DIAGNOSIS — M46.1 SACROILIAC INFLAMMATION (HCC): ICD-10-CM

## 2019-11-25 DIAGNOSIS — T81.89XA INCISIONAL IRRITATION, INITIAL ENCOUNTER: ICD-10-CM

## 2019-11-25 PROCEDURE — 87070 CULTURE OTHR SPECIMN AEROBIC: CPT | Performed by: NURSE PRACTITIONER

## 2019-11-25 PROCEDURE — 99214 OFFICE O/P EST MOD 30 MIN: CPT | Performed by: NURSE PRACTITIONER

## 2019-11-25 PROCEDURE — 87205 SMEAR GRAM STAIN: CPT | Performed by: NURSE PRACTITIONER

## 2019-11-25 RX ORDER — ERGOCALCIFEROL 1.25 MG/1
50000 CAPSULE ORAL WEEKLY
Refills: 5 | COMMUNITY
Start: 2019-11-11 | End: 2020-11-20

## 2019-11-25 RX ORDER — ONDANSETRON 4 MG/1
4 TABLET, FILM COATED ORAL EVERY 8 HOURS PRN
Refills: 0 | COMMUNITY
Start: 2019-11-19 | End: 2020-11-20

## 2019-11-25 RX ORDER — CLINDAMYCIN HYDROCHLORIDE 300 MG/1
CAPSULE ORAL
Refills: 0 | COMMUNITY
Start: 2019-11-21 | End: 2020-11-20

## 2019-11-25 RX ORDER — CARVEDILOL 3.12 MG/1
3.12 TABLET ORAL 2 TIMES DAILY
Refills: 2 | COMMUNITY
Start: 2019-10-12 | End: 2020-11-20

## 2019-11-25 RX ORDER — BLOOD-GLUCOSE METER
EACH MISCELLANEOUS SEE ADMIN INSTRUCTIONS
Refills: 0 | COMMUNITY
Start: 2019-11-21 | End: 2020-11-20

## 2019-11-25 NOTE — PROGRESS NOTES
CHIEF COMPLAINT  Here for pump refill.     Subjective   Isha Llanes is a 53 y.o. female  who presents to the office for follow-up.She has a history of chronic back pain. Reports her pain pattern is WORSE since last office visit.    She completed a pain pump implantation/revision   on  10-18-19 performed by Dr. VASQUEZ for management of END OF PUMP LIFE.    Started feeling sick a few days ago. Felt achy. Does not remember running a fever. Was having pain and tenderness at incision site. Went to PCP. Started on Clindamycin 300 mg TID for 10 days. Started 2-4 days ago.  She states there was some greenish-yellow exudate with manipulation by PCP. No culture was obtained. She does have a history of MRSA.  She admits she has been sleeping on her floor. Does not always sleep in bed or on couch. She reports she is not taking baths, but does sit on side of tub and poor water over her head.     She stopped Gabapentin approximately 3 days ago. Is having less somnolence since stopping this. However, her pain has increased and she is asking about increasing her pain medication through pump.     Complains of pain in her back and legs. Today her pain is 7/10VAs. Describes her pain as continuous aching and throbbing as well as burning. Pain is worst at night.  Continues with IT pain pump. combination of Dilaudid 5 mg/ml and ROpivicaine 0.2 mg/ml. Daily dose is 3.0 mg/day of Dilaudid with addiction of PTM. Her PTM is set at 0.4 mg to be given over 5 minutes with a 3 hour lockout and a maximum activation of 5 times in 24 hours. Denies any side effects from the PTM. Does not believe she is having any side effects from the dilaudid IT pump.    Back Pain   This is a chronic problem. The current episode started more than 1 year ago. The problem has been gradually worsening since onset. The pain is present in the lumbar spine. The quality of the pain is described as aching and stabbing. The pain is at a severity of 7/10. The  "symptoms are aggravated by bending, sitting, standing, twisting and position. Associated symptoms include numbness (legs and feet bilaterally), pelvic pain (to see oncologist) and weakness (legs bilat.). Pertinent negatives include no abdominal pain, chest pain, dysuria, fever or headaches. Risk factors include lack of exercise, poor posture, sedentary lifestyle and history of cancer. She has tried analgesics, bed rest, heat and ice for the symptoms. The treatment provided no relief.      The following portions of the patient's history were reviewed and updated as appropriate: allergies, current medications, past family history, past medical history, past social history, past surgical history and problem list.    Review of Systems   Constitutional: Positive for activity change (decreased). Negative for chills and fever.   HENT: Negative for ear pain.    Eyes: Negative for pain.   Respiratory: Negative for chest tightness and wheezing.    Cardiovascular: Positive for leg swelling. Negative for chest pain.   Gastrointestinal: Negative for abdominal pain, blood in stool, nausea and vomiting.   Genitourinary: Positive for pelvic pain (to see oncologist). Negative for dysuria.   Musculoskeletal: Positive for arthralgias, back pain, gait problem and neck pain. Negative for joint swelling.   Allergic/Immunologic: Negative for immunocompromised state.   Neurological: Positive for weakness (legs bilat.) and numbness (legs and feet bilaterally). Negative for dizziness, light-headedness and headaches.   Hematological: Does not bruise/bleed easily.   Psychiatric/Behavioral: Positive for agitation and sleep disturbance. Negative for behavioral problems and suicidal ideas. The patient is not nervous/anxious.        Vitals:    11/25/19 1308   BP: 106/67   Pulse: 86   Resp: 20   Temp: 98.6 °F (37 °C)   SpO2: 96%   Weight: 75.3 kg (166 lb)  Comment: pt refused wt in office, stated wt   Height: 170.2 cm (67\")   PainSc:   7   PainLoc: " Back     Objective   Physical Exam   Constitutional: She is oriented to person, place, and time. Vital signs are normal. She appears well-developed and well-nourished. She is cooperative.   HENT:   Head: Normocephalic and atraumatic.   Nose: Nose normal.   Eyes: Conjunctivae and lids are normal.   Cardiovascular: Normal rate.   Pulmonary/Chest: Effort normal.   Abdominal: Normal appearance.   Neurological: She is alert and oriented to person, place, and time. Gait (motorized wheelchair) abnormal.   Skin: Skin is warm and dry.        Psychiatric: She has a normal mood and affect. Her speech is normal and behavior is normal. Judgment and thought content normal. Cognition and memory are normal.   Nursing note and vitals reviewed.        Assessment/Plan   Isha was seen today for back pain.    Diagnoses and all orders for this visit:    Chronic pain syndrome    Sacroiliac inflammation (CMS/HCC)    Presence of intrathecal pump    Incisional irritation, initial encounter  -     Wound Culture - Wound, Back, Lower; Future  -     Wound Culture - Wound, Back, Lower      --- The urine drug screen confirmation from 3-22-19 has been reviewed and the result is APPROPRIATE based on patient history and TERRELL report  --- Reviewed no pump changes at this time.  --- obtained culture of wound. I realize she is already on antibiotics but wanted to have as much information as possible.  ---  Plan to continue with regimen as previously prescribed. Will monitor improvement with antibiotics. I reviewed with patient that she should have come to this clinic when she first started having issues, so that we could better evaluated and have an appropriate trajectory. There is no overt odor or drainage. Called dr lopez and sent picture of wound. Plan will be to continue with Clindamycin and follow-up in 1 week with DR Lopez.  ---  Reviewed signs and symptoms of infection, including but not limited to-- fever, chills, stiff neck,  headache, flu-like symptoms, increased tenderness and redness to incisions, as well as drainage or swelling. If patient notices this, she is to call office immediately. Patient verbalized understanding.  --- re-start gabapentin at night. Discussed medication with the patient.  Included in this discussion was the potential for side effects and adverse events.  Patient verbalized understanding and wished to proceed..  --- Follow-up 1 week       TERRELL REPORT    As part of the patient's treatment plan, I am prescribing controlled substances. The patient has been made aware of appropriate use of such medications, including potential risk of somnolence, limited ability to drive and/or work safely, and the potential for dependence or overdose. It has also bee made clear that these medications are for use by this patient only, without concomitant use of alcohol or other substances unless prescribed.     Patient has completed prescribing agreement detailing terms of continued prescribing of controlled substances, including monitoring TERRELL reports, urine drug screening, and pill counts if necessary. The patient is aware that inappropriate use will results in cessation of prescribing such medications.    TERRELL report has been reviewed and scanned into the patient's chart.    As the clinician, I personally reviewed the TERRELL from 11-22-19 while the patient was in the office today.    History and physical exam exhibit continued safe and appropriate use of controlled substances.      EMR Dragon/Transcription disclaimer:   Much of this encounter note is an electronic transcription/translation of spoken language to printed text. The electronic translation of spoken language may permit erroneous, or at times, nonsensical words or phrases to be inadvertently transcribed; Although I have reviewed the note for such errors, some may still exist.

## 2019-11-27 NOTE — PROGRESS NOTES
Well at least is isn't staph aureus.  I suppose it could still be methicillin resistant though, infection or contaminant. I don't think we have to rush to the OR today.

## 2019-11-28 LAB
BACTERIA SPEC AEROBE CULT: NORMAL
GRAM STN SPEC: NORMAL

## 2019-12-02 ENCOUNTER — OFFICE VISIT (OUTPATIENT)
Dept: PAIN MEDICINE | Facility: CLINIC | Age: 54
End: 2019-12-02

## 2019-12-02 VITALS
SYSTOLIC BLOOD PRESSURE: 104 MMHG | HEART RATE: 80 BPM | TEMPERATURE: 97.6 F | DIASTOLIC BLOOD PRESSURE: 76 MMHG | OXYGEN SATURATION: 98 % | RESPIRATION RATE: 16 BRPM

## 2019-12-02 DIAGNOSIS — G89.4 CHRONIC PAIN SYNDROME: Primary | ICD-10-CM

## 2019-12-02 DIAGNOSIS — T81.89XD INCISIONAL IRRITATION, SUBSEQUENT ENCOUNTER: ICD-10-CM

## 2019-12-02 DIAGNOSIS — Z97.8 PRESENCE OF INTRATHECAL PUMP: ICD-10-CM

## 2019-12-02 PROCEDURE — 62368 ANALYZE SP INF PUMP W/REPROG: CPT | Performed by: ANESTHESIOLOGY

## 2019-12-02 PROCEDURE — 99212 OFFICE O/P EST SF 10 MIN: CPT | Performed by: ANESTHESIOLOGY

## 2019-12-02 NOTE — PROGRESS NOTES
"CHIEF COMPLAINT  F/U back pain- patient is here to follow-up on incision. Patient states that the incision is still numb and is tingling.     Subjective   Isha Llanes is a 54 y.o. female  who presents to the office for follow-up.She has a history of Chronic back pain.    She is returning today for follow-up, having had a new intrathecal drug delivery system implanted on October 18, 2019.  Her pump is at end-of-life and there was concern about her previous catheter so we implanted a whole new system.  Of note she still had the previous system which was implanted with the pump in the abdomen, this was uncomfortable so we decided to change the implantation site of the pump.  Because of the logistics we did not explant that pump due to surgical positioning but decided to delay that decision to remove the dead pump system until a later date when therapy was restored.    She came back in the office last week and said that she had been sick for several days and her primary care provider started her on clindamycin.  She had a history of MRSA and there was irritation and tenderness at the pump incision site.      On review, she saw my partner Blanca ESCOBAR on November 25, last week, and the medial portion of that intrathecal pump pocket was not as well approximated and there was some clear serous drainage.  There was \"black hair present inside wound\".  Per report the patient attributed this to pet.  Image of the incision site is in the November 25 note.    Culture was obtained last week and it was a methicillin sensitive staph species.  She continued on the clindamycin.      Also incidentally gabapentin was restarted.    .      History of Present Illness       PUMP NOTE:  [employer] IT pain pump. combination of Dilaudid 10 mg/ml and ROpivicaine 0.5 mg/ml. Daily dose is 3.0 mg/day of Dilaudid and 0.15mg bupivacaine per day, with addition of PTM. Her PTM is set at 0.4 mg to be given over 5 minutes with a 3 hour " lockout and a maximum activation of 5 times in 24 hours. Max therefore 4.951mg hydromorphone with qs bupivacaine.  Denies any side effects from the PTM. Does not believe she is having any side effects from the dilaudid IT pump.    Est vol remaining 36.9ml. PTM 26 activations since 11-25-19.      Increasing basal rate to 4mg hydromorphone per day with qs bupivacaine.  PTM change to 6 activations per day, 1 hr lockout.      --  The aforementioned information the Chief Complaint section and above subjective data including any HPI data has been personally reviewed and affirmed.  --        The following portions of the patient's history were reviewed and updated as appropriate: allergies, current medications, past family history, past medical history, past social history, past surgical history and problem list.    -------    The following portions of the patient's history were reviewed and updated as appropriate: allergies, current medications, past family history, past medical history, past social history, past surgical history and problem list.    Allergies   Allergen Reactions   • Amoxicillin Shortness Of Breath   • Ceclor [Cefaclor] Shortness Of Breath   • Penicillins Shortness Of Breath   • Sulfa Antibiotics Rash   • Doxycycline GI Intolerance   • Compazine [Prochlorperazine Edisylate] Rash   • Contrast Dye Other (See Comments)     Caused pain in her arm   • Phenergan [Promethazine Hcl] Rash         Current Outpatient Medications:   •  ACCU-CHEK CURT PLUS test strip, SUGAR THREE TIMES DAILY, Disp: , Rfl: 11  •  ACCU-CHEK SOFTCLIX LANCETS lancets, USE TO TEST BLOOD SUGARS THREE TIMES DAILY, Disp: , Rfl: 11  •  acetaminophen (TYLENOL) 500 MG tablet, Take 1,000-1,500 mg by mouth Every 6 (Six) Hours As Needed for Mild Pain ., Disp: , Rfl:   •  albuterol (PROVENTIL HFA;VENTOLIN HFA) 108 (90 Base) MCG/ACT inhaler, Inhale 2 puffs Every 4 (Four) Hours As Needed for Wheezing., Disp: , Rfl:   •  ANTI-DIARRHEAL 2 MG tablet,  TAKE 1 TABLET BY MOUTH EVERY 8 HOURS AS NEEDED FOR 10 DAYS, Disp: , Rfl: 0  •  Blood Glucose Monitoring Suppl (ACCU-CHEK GUIDE) w/Device kit, See Admin Instructions., Disp: , Rfl: 0  •  carvedilol (COREG) 3.125 MG tablet, Take 3.125 mg by mouth 2 (Two) Times a Day., Disp: , Rfl: 2  •  clindamycin (CLEOCIN) 300 MG capsule, TAKE ONE CAPSULE BY MOUTH THREE TIMES DAILY for TEN DAYS, Disp: , Rfl: 0  •  furosemide (LASIX) 40 MG tablet, Take 40 mg by mouth Daily., Disp: , Rfl:   •  gabapentin (NEURONTIN) 300 MG capsule, Take 300 mg by mouth 3 (Three) Times a Day., Disp: , Rfl:   •  glimepiride (AMARYL) 2 MG tablet, Take 2 mg by mouth Daily., Disp: , Rfl: 1  •  HYDROmorphone (DILAUDID) 1 mg/mL solution, Infuse  into a venous catheter Dose Adjusted By Provider As Needed., Disp: , Rfl:   •  mupirocin (BACTROBAN) 2 % cream, Apply  topically to the appropriate area as directed 2 (Two) Times a Day., Disp: 15 g, Rfl: 0  •  mupirocin (BACTROBAN) 2 % ointment, USE ONE application intranasally TWICE DAILY, Disp: , Rfl: 1  •  naloxone (NARCAN) 4 MG/0.1ML nasal spray, 1 spray into the nostril(s) as directed by provider As Needed (for overdose)., Disp: 1 each, Rfl: 0  •  ondansetron (ZOFRAN) 4 MG tablet, Take 4 mg by mouth Every 8 (Eight) Hours As Needed., Disp: , Rfl: 0  •  ondansetron ODT (ZOFRAN ODT) 4 MG disintegrating tablet, Take 1-2 tablets by mouth Every 8 (Eight) Hours As Needed for Nausea or Vomiting., Disp: 60 tablet, Rfl: 0  •  vitamin D (ERGOCALCIFEROL) 1.25 MG (31311 UT) capsule capsule, Take 50,000 Units by mouth 1 (One) Time Per Week., Disp: , Rfl: 5    Current Outpatient Medications on File Prior to Visit   Medication Sig Dispense Refill   • ACCU-CHEK CURT PLUS test strip SUGAR THREE TIMES DAILY  11   • ACCU-CHEK SOFTCLIX LANCETS lancets USE TO TEST BLOOD SUGARS THREE TIMES DAILY  11   • acetaminophen (TYLENOL) 500 MG tablet Take 1,000-1,500 mg by mouth Every 6 (Six) Hours As Needed for Mild Pain .     • albuterol  (PROVENTIL HFA;VENTOLIN HFA) 108 (90 Base) MCG/ACT inhaler Inhale 2 puffs Every 4 (Four) Hours As Needed for Wheezing.     • ANTI-DIARRHEAL 2 MG tablet TAKE 1 TABLET BY MOUTH EVERY 8 HOURS AS NEEDED FOR 10 DAYS  0   • Blood Glucose Monitoring Suppl (ACCU-CHEK GUIDE) w/Device kit See Admin Instructions.  0   • carvedilol (COREG) 3.125 MG tablet Take 3.125 mg by mouth 2 (Two) Times a Day.  2   • clindamycin (CLEOCIN) 300 MG capsule TAKE ONE CAPSULE BY MOUTH THREE TIMES DAILY for TEN DAYS  0   • furosemide (LASIX) 40 MG tablet Take 40 mg by mouth Daily.     • gabapentin (NEURONTIN) 300 MG capsule Take 300 mg by mouth 3 (Three) Times a Day.     • glimepiride (AMARYL) 2 MG tablet Take 2 mg by mouth Daily.  1   • HYDROmorphone (DILAUDID) 1 mg/mL solution Infuse  into a venous catheter Dose Adjusted By Provider As Needed.     • mupirocin (BACTROBAN) 2 % cream Apply  topically to the appropriate area as directed 2 (Two) Times a Day. 15 g 0   • mupirocin (BACTROBAN) 2 % ointment USE ONE application intranasally TWICE DAILY  1   • naloxone (NARCAN) 4 MG/0.1ML nasal spray 1 spray into the nostril(s) as directed by provider As Needed (for overdose). 1 each 0   • ondansetron (ZOFRAN) 4 MG tablet Take 4 mg by mouth Every 8 (Eight) Hours As Needed.  0   • ondansetron ODT (ZOFRAN ODT) 4 MG disintegrating tablet Take 1-2 tablets by mouth Every 8 (Eight) Hours As Needed for Nausea or Vomiting. 60 tablet 0   • vitamin D (ERGOCALCIFEROL) 1.25 MG (80736 UT) capsule capsule Take 50,000 Units by mouth 1 (One) Time Per Week.  5     No current facility-administered medications on file prior to visit.        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)   • Other pulmonary embolism without acute cor pulmonale (CMS/HCC)   • Nonrheumatic aortic valve stenosis       Past Medical History:   Diagnosis Date   • Anxiety    • Anxiety    • Aortic stenosis, severe    • Arthritis    • Arthritis    • Asthma    • Back pain    • Blood clotting disorder (CMS/HCC)    • Cancer associated pain    • Chronic low back pain with sciatica    • Diabetes mellitus (CMS/HCC)     TYPE 2   • Dyspnea on effort    • GERD (gastroesophageal reflux disease)    • Hiatal hernia    • History of kidney stones    • History of transfusion    • Hodgkin's lymphoma (CMS/HCC)    • Immobility    • Low back pain    • Lupus (CMS/HCC)    • Mid back pain    • Other pulmonary embolism without acute cor pulmonale (CMS/HCC)    • Pelvic pain    • Peripheral neuropathy    • Presence of intrathecal pump    • Sacroiliac joint disease    • SI (sacroiliac) joint inflammation (CMS/HCC)        Past Surgical History:   Procedure Laterality Date   • BACK SURGERY     • BLADDER REPAIR     • CARDIAC CATHETERIZATION N/A 3/6/2019    Procedure: Valvuloplasty;  Surgeon: Wayne Johnson MD;  Location: [facility] INVASIVE LOCATION;  Service: Cardiology   • CHOLECYSTECTOMY     • HYSTERECTOMY     • LYMPHADENECTOMY     • PAIN PUMP INSERTION/REVISION     • PAIN PUMP INSERTION/REVISION N/A 10/18/2019    Procedure: PAIN PUMP INSERTION [facility] 10-18-19 [place];  Surgeon: Horace Rios MD;  Location: [facility] OR;  Service: Pain Management   • TONSILLECTOMY     • TUBAL ABDOMINAL LIGATION     • TUMOR REMOVAL         Family History   Problem Relation Age of Onset   • Pancreatic cancer Sister    • Pancreatic cancer Paternal Grandmother    • Malig Hyperthermia Neg Hx        Social History     Socioeconomic History   • Marital status: Single     Spouse name: Not on file   • Number of children: Not on file   • Years of education: Not on file   •  Highest education level: Not on file   Tobacco Use   • Smoking status: Current Every Day Smoker     Packs/day: 2.00     Years: 41.00     Pack years: 82.00     Types: Cigarettes   • Smokeless tobacco: Never Used   • Tobacco comment: patient refused   Substance and Sexual Activity   • Alcohol use: No   • Drug use: No   • Sexual activity: Defer     Birth control/protection: Surgical     Comment: KRYSTAL       -------        Review of Systems   Constitutional: Positive for activity change (decreased). Negative for chills and fever.   HENT: Negative for ear pain.    Eyes: Negative for pain.   Respiratory: Negative for chest tightness and wheezing.    Cardiovascular: Positive for leg swelling. Negative for chest pain.   Gastrointestinal: Negative for abdominal pain, blood in stool, nausea and vomiting.   Genitourinary: Positive for pelvic pain (to see oncologist). Negative for dysuria.   Musculoskeletal: Positive for arthralgias, back pain, gait problem and neck pain. Negative for joint swelling.   Allergic/Immunologic: Negative for immunocompromised state.   Neurological: Positive for weakness (legs bilat.) and numbness (legs and feet bilaterally). Negative for dizziness, light-headedness and headaches.   Hematological: Does not bruise/bleed easily.   Psychiatric/Behavioral: Positive for agitation and sleep disturbance. Negative for behavioral problems and suicidal ideas. The patient is not nervous/anxious.        Vitals:    12/02/19 1243   BP: 104/76   Pulse: 80   Resp: 16   Temp: 97.6 °F (36.4 °C)   SpO2: 98%   PainSc:   8   PainLoc: Back         Objective   Physical Exam  Vital signs are stable.  Nursing note reviewed.  She has her usual kyphotic posture.  She can stand up and ambulate but it slow.  The pump pocket incision wound is healed.  There is no induration and no drainage.  No erythema and no tenderness.      Assessment/Plan   Isha was seen today for back pain.    Diagnoses and all orders for this  visit:    Chronic pain syndrome    Presence of intrathecal pump    Incisional irritation, subsequent encounter        --- Follow-up in 3 wks  -- intrathecal pump program changes as above  -- wound clinic referral will be considered if not improving                   EMR Dragon/Transcription disclaimer:   Much of this encounter note is an electronic transcription/translation of spoken language to printed text. The electronic translation of spoken language may permit erroneous, or at times, nonsensical words or phrases to be inadvertently transcribed; Although I have reviewed the note for such errors, some may still exist.

## 2019-12-12 ENCOUNTER — TELEPHONE (OUTPATIENT)
Dept: PAIN MEDICINE | Facility: CLINIC | Age: 54
End: 2019-12-12

## 2019-12-12 NOTE — TELEPHONE ENCOUNTER
Pt called today c/o worsening arthritis in her knees, she was wondering if she can have any medication to help with her arthritis? Please advise.

## 2020-01-16 ENCOUNTER — OFFICE VISIT CONVERTED (OUTPATIENT)
Dept: INTERNAL MEDICINE | Facility: CLINIC | Age: 55
End: 2020-01-16
Attending: PHYSICIAN ASSISTANT

## 2020-01-16 ENCOUNTER — CONVERSION ENCOUNTER (OUTPATIENT)
Dept: INTERNAL MEDICINE | Facility: CLINIC | Age: 55
End: 2020-01-16

## 2020-01-21 ENCOUNTER — OFFICE VISIT (OUTPATIENT)
Dept: PAIN MEDICINE | Facility: CLINIC | Age: 55
End: 2020-01-21

## 2020-01-21 VITALS
WEIGHT: 161 LBS | HEART RATE: 81 BPM | RESPIRATION RATE: 20 BRPM | TEMPERATURE: 98.6 F | BODY MASS INDEX: 25.27 KG/M2 | SYSTOLIC BLOOD PRESSURE: 110 MMHG | HEIGHT: 67 IN | OXYGEN SATURATION: 97 % | DIASTOLIC BLOOD PRESSURE: 72 MMHG

## 2020-01-21 DIAGNOSIS — G89.29 CHRONIC LOW BACK PAIN WITH BILATERAL SCIATICA, UNSPECIFIED BACK PAIN LATERALITY: ICD-10-CM

## 2020-01-21 DIAGNOSIS — Z97.8 PRESENCE OF INTRATHECAL PUMP: ICD-10-CM

## 2020-01-21 DIAGNOSIS — G89.4 CHRONIC PAIN SYNDROME: Primary | ICD-10-CM

## 2020-01-21 DIAGNOSIS — M54.42 CHRONIC LOW BACK PAIN WITH BILATERAL SCIATICA, UNSPECIFIED BACK PAIN LATERALITY: ICD-10-CM

## 2020-01-21 DIAGNOSIS — M54.41 CHRONIC LOW BACK PAIN WITH BILATERAL SCIATICA, UNSPECIFIED BACK PAIN LATERALITY: ICD-10-CM

## 2020-01-21 DIAGNOSIS — M46.1 SACROILIAC INFLAMMATION (HCC): ICD-10-CM

## 2020-01-21 PROCEDURE — 99214 OFFICE O/P EST MOD 30 MIN: CPT | Performed by: NURSE PRACTITIONER

## 2020-01-21 RX ORDER — DULOXETIN HYDROCHLORIDE 20 MG/1
20 CAPSULE, DELAYED RELEASE ORAL DAILY
COMMUNITY
Start: 2019-12-05 | End: 2020-11-20

## 2020-01-21 NOTE — PROGRESS NOTES
"CHIEF COMPLAINT  F/u back pain. Pt sts back pain has worsened since last ov. Pt c/o increased pain in tailbone, and shoulder blades x 5 days. Pt would like to discuss increase of pain med in pain pump and explant of old pain pump.     Subjective   Isha Llanes is a 54 y.o. female  who presents to the office for follow-up.She has a history of chronic back pain. Reports her pain is worse since last office visit. Is asking for increase in her pump levels.    She completed a pain pump implantation/revision   on  10-18-19 performed by Dr. VASQUEZ for management of END OF PUMP LIFE.    Complains of pain in her low back, legs and shoulders. Today her pain is 7/10VAS. \"I can tell it's working a little different than the old one. Better pain control in certain areas.\"  Also using PTM. Denies any side effects from the regimen. Does notice improvement in her overall pain level. Is wanting to start walking. Planning on attending PT.     Current rate is Dilaudid 3.99 mg/day + PTM. PTM is 0.4 mg/6/day--- 6.34 mg/day.  According to reading, has 7.9 ml currently.  She did ask about \"break-through pain medication.\" reviewed no plans for oral opioids.  Back Pain   This is a chronic problem. The current episode started more than 1 year ago. The problem has been gradually worsening (worse since last office visit) since onset. The pain is present in the lumbar spine. The quality of the pain is described as aching and stabbing. The pain is at a severity of 7/10. The symptoms are aggravated by bending, sitting, standing, twisting and position. Associated symptoms include numbness (legs and feet bilaterally), pelvic pain (to see oncologist) and weakness (legs bilat.). Pertinent negatives include no abdominal pain, chest pain, dysuria, fever or headaches. Risk factors include lack of exercise, poor posture, sedentary lifestyle and history of cancer. She has tried analgesics, bed rest, heat and ice for the symptoms. The treatment " "provided no relief.      PEG Assessment   What number best describes your pain on average in the past week?6  What number best describes how, during the past week, pain has interfered with your enjoyment of life?6  What number best describes how, during the past week, pain has interfered with your general activity?  6    The following portions of the patient's history were reviewed and updated as appropriate: allergies, current medications, past family history, past medical history, past social history, past surgical history and problem list.    Review of Systems   Constitutional: Positive for activity change (decreased). Negative for chills and fever.   HENT: Negative for ear pain.    Eyes: Negative for pain.   Respiratory: Negative for chest tightness and wheezing.    Cardiovascular: Positive for leg swelling. Negative for chest pain.   Gastrointestinal: Negative for abdominal pain, blood in stool, nausea and vomiting.   Genitourinary: Positive for pelvic pain (to see oncologist). Negative for dysuria.   Musculoskeletal: Positive for arthralgias, back pain, gait problem and neck pain. Negative for joint swelling.   Allergic/Immunologic: Negative for immunocompromised state.   Neurological: Positive for weakness (legs bilat.) and numbness (legs and feet bilaterally). Negative for dizziness, light-headedness and headaches.   Hematological: Does not bruise/bleed easily.   Psychiatric/Behavioral: Positive for agitation and sleep disturbance. Negative for behavioral problems and suicidal ideas. The patient is not nervous/anxious.        Vitals:    01/21/20 1247   BP: 110/72   Pulse: 81   Resp: 20   Temp: 98.6 °F (37 °C)   SpO2: 97%   Weight: 73 kg (161 lb)   Height: 170.2 cm (67\")   PainSc:   7   PainLoc: Back     Objective   Physical Exam   Constitutional: She is oriented to person, place, and time. Vital signs are normal. She appears well-developed and well-nourished. She is cooperative.   HENT:   Head: Normocephalic " and atraumatic.   Nose: Nose normal.   Eyes: Conjunctivae and lids are normal.   Cardiovascular: Normal rate.   Pulmonary/Chest: Effort normal.   Abdominal: Normal appearance.   Neurological: She is alert and oriented to person, place, and time. Gait (motorized wheelchair) abnormal.   Skin: Skin is warm and dry.        ACE wraps of BLE's   Psychiatric: She has a normal mood and affect. Her speech is normal and behavior is normal. Judgment and thought content normal. Cognition and memory are normal.   Nursing note and vitals reviewed.      Assessment/Plan   Isha was seen today for back pain.    Diagnoses and all orders for this visit:    Chronic pain syndrome    Sacroiliac inflammation (CMS/HCC)    Chronic low back pain with bilateral sciatica, unspecified back pain laterality    Presence of intrathecal pump      --- The urine drug screen confirmation from 3-22-19 has been reviewed and the result is APPROPRIATE based on patient history and TERRELL report  --- Increase basal rate 4.45 mg/day, decrease PTM to 5/day. Discussed medication with the patient.  Included in this discussion was the potential for side effects and adverse events.  Patient verbalized understanding and wished to proceed.   --- Discussed backbrace, ABD binder or ace wrap for edema of pump site.   --- Follow-up pump refill when due-- new due date is 1-30-20.     TERRELL REPORT    As part of the patient's treatment plan, I am prescribing controlled substances. The patient has been made aware of appropriate use of such medications, including potential risk of somnolence, limited ability to drive and/or work safely, and the potential for dependence or overdose. It has also bee made clear that these medications are for use by this patient only, without concomitant use of alcohol or other substances unless prescribed.     Patient has completed prescribing agreement detailing terms of continued prescribing of controlled substances, including  monitoring TERRELL reports, urine drug screening, and pill counts if necessary. The patient is aware that inappropriate use will results in cessation of prescribing such medications.    TERRELL report has been reviewed and scanned into the patient's chart.    As the clinician, I personally reviewed the TERRELL from 1-20-20 while the patient was in the office today.    History and physical exam exhibit continued safe and appropriate use of controlled substances.      EMR Dragon/Transcription disclaimer:   Much of this encounter note is an electronic transcription/translation of spoken language to printed text. The electronic translation of spoken language may permit erroneous, or at times, nonsensical words or phrases to be inadvertently transcribed; Although I have reviewed the note for such errors, some may still exist.

## 2020-01-29 ENCOUNTER — OFFICE VISIT (OUTPATIENT)
Dept: PAIN MEDICINE | Facility: CLINIC | Age: 55
End: 2020-01-29

## 2020-01-29 VITALS
WEIGHT: 161 LBS | HEART RATE: 75 BPM | SYSTOLIC BLOOD PRESSURE: 101 MMHG | RESPIRATION RATE: 20 BRPM | TEMPERATURE: 98.6 F | OXYGEN SATURATION: 93 % | DIASTOLIC BLOOD PRESSURE: 64 MMHG | HEIGHT: 67 IN | BODY MASS INDEX: 25.27 KG/M2

## 2020-01-29 DIAGNOSIS — G89.29 CHRONIC LOW BACK PAIN WITH BILATERAL SCIATICA, UNSPECIFIED BACK PAIN LATERALITY: ICD-10-CM

## 2020-01-29 DIAGNOSIS — M53.3 SACROILIAC JOINT DYSFUNCTION: ICD-10-CM

## 2020-01-29 DIAGNOSIS — Z97.8 PRESENCE OF INTRATHECAL PUMP: ICD-10-CM

## 2020-01-29 DIAGNOSIS — M46.1 SACROILIITIS (HCC): ICD-10-CM

## 2020-01-29 DIAGNOSIS — M54.41 CHRONIC LOW BACK PAIN WITH BILATERAL SCIATICA, UNSPECIFIED BACK PAIN LATERALITY: ICD-10-CM

## 2020-01-29 DIAGNOSIS — M54.42 CHRONIC LOW BACK PAIN WITH BILATERAL SCIATICA, UNSPECIFIED BACK PAIN LATERALITY: ICD-10-CM

## 2020-01-29 DIAGNOSIS — G89.4 CHRONIC PAIN SYNDROME: Primary | ICD-10-CM

## 2020-01-29 PROCEDURE — 99215 OFFICE O/P EST HI 40 MIN: CPT | Performed by: NURSE PRACTITIONER

## 2020-01-29 PROCEDURE — 62369 ANAL SP INF PMP W/REPRG&FILL: CPT | Performed by: NURSE PRACTITIONER

## 2020-01-29 RX ORDER — METHYLPREDNISOLONE 4 MG/1
TABLET ORAL
Qty: 21 TABLET | Refills: 0 | Status: SHIPPED | OUTPATIENT
Start: 2020-01-29 | End: 2020-11-20

## 2020-01-29 NOTE — PROGRESS NOTES
"CHIEF COMPLAINT  Here for pump refill. Pt sts back pain is worse. Pt sts started using cbd cream a week ago, using prn on knees and wrists.     Subjective   Isha Llanes is a 54 y.o. female  who presents to the office for follow-up.She has a history of chronic back pain. Reports her pain is worse since last office visit.     Patient was evaluated in the office on 1/21/2020 at that time she presented complaining of increased pain.  Asked to increase pump.  She was at a current basal rate of Dilaudid 4 mg/day with the addition of PTM.  Her maximum daily dose was 6.34 mg/day.  Since she was complaining of increased pain, the plan was to increase the basal rate to 4.45 mg/day.  We also decrease the PTM to 5/day because she is not utilizing this at 6 times per day.  Plan to follow-up for pump refill when due before 1/30/2020.    Reviewed multiple times with patient and at length about her daily morphine equivalency. She states multiple times \"I shouldn't be hurting like this.\" I reviewed she is on close to 6 mg/day of intrathecal hydromorphine which has a morphine equivalency of over 1500 mg/day of morphine. If she is not noticing improvement with increases, she may be suffering from hyperalgesia. She states it jsut hurts too much to move. Reviewed lack of activity will increase pain. She states she is only allotted 5 PT visits. She is asking about [employer] chair.   Back Pain   This is a chronic problem. The current episode started more than 1 year ago. The problem has been gradually worsening (worse since last office visit) since onset. The pain is present in the lumbar spine. The quality of the pain is described as aching and stabbing. The pain is at a severity of 7/10. The symptoms are aggravated by bending, sitting, standing, twisting and position. Associated symptoms include numbness (legs and feet bilaterally), pelvic pain (to see oncologist) and weakness (legs bilat.). Pertinent negatives " "include no abdominal pain, chest pain, dysuria, fever or headaches. Risk factors include lack of exercise, poor posture, sedentary lifestyle and history of cancer. She has tried analgesics, bed rest, heat and ice for the symptoms. The treatment provided no relief.          The following portions of the patient's history were reviewed and updated as appropriate: allergies, current medications, past family history, past medical history, past social history, past surgical history and problem list.    Review of Systems   Constitutional: Positive for activity change (decreased). Negative for chills and fever.   HENT: Negative for ear pain.    Eyes: Negative for pain.   Respiratory: Negative for chest tightness and wheezing.    Cardiovascular: Positive for leg swelling. Negative for chest pain.   Gastrointestinal: Negative for abdominal pain, blood in stool, nausea and vomiting.   Genitourinary: Positive for pelvic pain (to see oncologist). Negative for dysuria.   Musculoskeletal: Positive for arthralgias, back pain, gait problem and neck pain. Negative for joint swelling.   Allergic/Immunologic: Negative for immunocompromised state.   Neurological: Positive for weakness (legs bilat.) and numbness (legs and feet bilaterally). Negative for dizziness, light-headedness and headaches.   Hematological: Does not bruise/bleed easily.   Psychiatric/Behavioral: Positive for agitation and sleep disturbance. Negative for behavioral problems and suicidal ideas. The patient is not nervous/anxious.        Vitals:    01/29/20 1139   BP: 101/64   Pulse: 75   Resp: 20   Temp: 98.6 °F (37 °C)   SpO2: 93%   Weight: 73 kg (161 lb)  Comment: pt refused wt in office pt std wt   Height: 170.2 cm (67\")   PainSc:   7   PainLoc: Back     Objective   Physical Exam   Constitutional: She is oriented to person, place, and time. Vital signs are normal. She appears well-developed and well-nourished. She is cooperative.   HENT:   Head: Normocephalic and " atraumatic.   Nose: Nose normal.   Eyes: Conjunctivae and lids are normal.   Cardiovascular: Normal rate.   Pulmonary/Chest: Effort normal.   Abdominal: Normal appearance.   Musculoskeletal:        Lumbar back: She exhibits decreased range of motion, tenderness and bony tenderness (exquisite tenderness of bilateral SI joints).        Back:    Unable to perform BASILIO due to pain   Neurological: She is alert and oriented to person, place, and time. Gait (motorized wheelchair) abnormal.   Skin: Skin is warm, dry and intact.   ACE wraps of BLE's   Psychiatric: She has a normal mood and affect. Her speech is normal and behavior is normal. Judgment and thought content normal. Cognition and memory are normal.   Nursing note and vitals reviewed.    Intrathecal pump was interrogated in office today. Results are in chart.    The patient is currently at a dose of dilaudid 10 mg/ml + Naropin 0.5 mg/ml of 4.4 mg/day per day. There is the addition of PTM bolus. This is set at 0.4 mg/actuation given over 5 minutes. The lock-out is set at 4 hours with a maximum activation of 5 in 24 hours.    Under sterile technique, the pump was accessed using a [employer] proprietary refill kit. The volume was withdrawn from the pump. The expected residual volume of the pump was 3.3 ml. The actual residual volume of the pump was 3.5 ml.    The pump was then refilled with 40 ml of Dilaudid/Naropin at a concentration of 10/0.5 mg/ml. The patient was returned to previous regimen of dilaudid 4 mg/day with addition of PTM 0.4 mg over 3 minutes with lockout interval of 1 hour, maximum of 6 in 24 hours.      Patient tolerated procedure well. Minimal bleeding was noted. Pump site remains intact with no evidence of erythema or drainage.    Patient will return for pump refill when due or sooner if needed.     JUAN is June 2026    Bolus of 0.4 mg dailudid given in office today.    Assessment/Plan   Isha was seen today for back pain.    Diagnoses and  all orders for this visit:    Chronic pain syndrome  -     Oral Fluid Drug Screen - Saliva, Oral Cavity; Future    Sacroiliac joint dysfunction  -     Oral Fluid Drug Screen - Saliva, Oral Cavity; Future    Chronic low back pain with bilateral sciatica, unspecified back pain laterality  -     Oral Fluid Drug Screen - Saliva, Oral Cavity; Future    Presence of intrathecal pump  -     Oral Fluid Drug Screen - Saliva, Oral Cavity; Future    Sacroiliitis (CMS/HCC)  -     Case Request  -     Oral Fluid Drug Screen - Saliva, Oral Cavity; Future    Other orders  -     methylPREDNISolone (MEDROL, SHANIA,) 4 MG tablet; Take as directed on package instructions.      --- Routine oral drug screen in office today as part of monitoring requirements for controlled substances.  This specimen will be sent to [facility] laboratory for confirmation.     --- Refill pump.  return to previous regimen of Dilaudid 4 mg/day and PTM dosing. She reports her pain was better controlled with this regimen than with the increase at last office visit.  Bolus in office today per patient request.  --- Medrol dosepack. Discussed medication with the patient.  Included in this discussion was the potential for side effects and adverse events.  Patient verbalized understanding and wished to proceed.  Prescription will be sent to pharmacy.  --- Bilateral Si joint injection. Reviewed the procedure at length with the patient.  Included in the review was expectations, complications, risk and benefits.The procedure was described in detail and the risks, benefits and alternatives were discussed with the patient (including but not limited to: bleeding, infection, nerve damage, worsening of pain, inability to perform injection, paralysis, seizures, and death) who agreed to proceed.  Discussed the potential for sedation if warranted/wanted.  The procedure will plan to be performed at [facility] with fluoroscopic guidance(unless ultrasound is  indicated). Questions were answered and in a way the patient could understand.  Patient verbalized understanding and wishes to proceed.  This intervention will be ordered.  Discussed with patient that all procedures are part of a multimodal plan of care and include either formal PT or a home exercise program.  Patient has no evidence of coagulopathy or current infection.  --- Will again reach out to Dr Vasquez in regards to explant of old pump. I reached out to Dr Vasquez after last office visit, but have had no response. Reviewed this with patient.   --- Follow-up with DR VASQUEZ PER PATIENT REQUEST.    Extensive education regarding opioid therapy, non-opioid options.    ----------  Education about Sacroiliac joint injections:  This Sacroiliac joint injection (blockade) we have suggested is intended for diagnostic purposes, with the intent of offering the patient Radiofrequency thermal rhizotomy (RF) of the sensory branches to the joint if the block is diagnostically effective.  The diagnostic blockade is necessary to determine the likelihood that RF therapy could be efficacious in providing long term relief to the patient.    In this procedure, the sacroiliac joint is aligned with imaging, and under image guidance a needle is placed with the needle tip into the joint.  The needle position is confirmed to be appropriately in the joint before injection of medication into the joint.  When xray fluoroscopy is used, contrast dye is used to confirm a proper arthrogram (i.e., outline of the joint).  When ultrasound is used, IV fluid (normal saline) is injected to see the flow of the fluid into the joint.  Once confirmed, then the medication can be injected into the joint.  Oftentimes this medication is a combination of local anesthetics (for diagnostic purposes) and also a steroid (to decrease irritation & inflammation in the joint, also known as sacroilitis).      Medically, a successful RF procedure should provide a  patient with 50% pain relief or more for at least 6 months.  Clinical experience suggests that successful patients receive relief more in the range of 12 months on average.  We also discussed that many patients receive therapeutic success from the intraarticular joint injection, and may not require RF ablation.  If a patient receives more than 8 weeks of relief from joint injection, then occasional repeat joint blocks for therapeutic purposes is a very reasonable alternative therapy.  This course of therapy is consistent with our LCDs according to our CMS  in the area, and therefore other insurance providers should follow accordingly.  We will monitor our patients to screen for these therapeutic responders and will offer RF therapy only when necessary.      We discussed that joint injections & also RF procedures are not without risks.  Best practices regarding anticoagulant use & neuraxial procedures will be respected.  Oftentimes a patient on an anticoagulant can be offered a joint injection safely, but again this is not risk-free, and such patients give consent with regards to this increased bleeding risk, which could cause problems including but not limited to worsening of pain, nerve damage, or muscle damage.  Patients that are ill or otherwise may be at risk for sepsis will not have their spines accessed by neuraxial injections of any type.  This patient will not be offered these therapies if there is an increased risk.   We discussed that there is a risk of postprocedural pain and also a risk of worsening of clinical picture with these procedures as with any neuraxial procedure.    ----------       TERRELL REPORT    As part of the patient's treatment plan, I am prescribing controlled substances. The patient has been made aware of appropriate use of such medications, including potential risk of somnolence, limited ability to drive and/or work safely, and the potential for dependence or overdose. It  has also bee made clear that these medications are for use by this patient only, without concomitant use of alcohol or other substances unless prescribed.     Patient has completed prescribing agreement detailing terms of continued prescribing of controlled substances, including monitoring TERRELL reports, urine drug screening, and pill counts if necessary. The patient is aware that inappropriate use will results in cessation of prescribing such medications.    TERRELL report has been reviewed and scanned into the patient's chart.    As the clinician, I personally reviewed the TERRELL from 1-28-20 while the patient was in the office today.    History and physical exam exhibit continued safe and appropriate use of controlled substances.      EMR Dragon/Transcription disclaimer:   Much of this encounter note is an electronic transcription/translation of spoken language to printed text. The electronic translation of spoken language may permit erroneous, or at times, nonsensical words or phrases to be inadvertently transcribed; Although I have reviewed the note for such errors, some may still exist.

## 2020-02-03 DIAGNOSIS — M53.3 SACROILIAC JOINT PAIN: Primary | ICD-10-CM

## 2020-02-04 ENCOUNTER — HOSPITAL ENCOUNTER (OUTPATIENT)
Dept: OTHER | Facility: HOSPITAL | Age: 55
Discharge: HOME | End: 2020-02-04
Attending: PHYSICIAN ASSISTANT

## 2020-02-04 ENCOUNTER — OFFICE VISIT CONVERTED (OUTPATIENT)
Dept: INTERNAL MEDICINE | Facility: CLINIC | Age: 55
End: 2020-02-04
Attending: PHYSICIAN ASSISTANT

## 2020-02-04 ENCOUNTER — CONVERSION ENCOUNTER (OUTPATIENT)
Dept: INTERNAL MEDICINE | Facility: CLINIC | Age: 55
End: 2020-02-04

## 2020-02-04 LAB
ALBUMIN SERPL-MCNC: 4.3 G/DL (ref 3.5–5)
ALBUMIN/GLOB SERPL: 1.3 {RATIO} (ref 1.4–2.6)
ALP SERPL-CCNC: 94 U/L (ref 53–141)
ALT SERPL-CCNC: 10 U/L (ref 10–40)
ANION GAP SERPL CALC-SCNC: 18 MMOL/L (ref 8–19)
AST SERPL-CCNC: 13 U/L (ref 15–50)
BASOPHILS # BLD AUTO: 0.03 10*3/UL (ref 0–0.2)
BASOPHILS NFR BLD AUTO: 0.5 % (ref 0–3)
BILIRUB SERPL-MCNC: 0.22 MG/DL (ref 0.2–1.3)
BUN SERPL-MCNC: 13 MG/DL (ref 5–25)
BUN/CREAT SERPL: 26 {RATIO} (ref 6–20)
CALCIUM SERPL-MCNC: 9.4 MG/DL (ref 8.7–10.4)
CHLORIDE SERPL-SCNC: 98 MMOL/L (ref 99–111)
CONV ABS IMM GRAN: 0.02 10*3/UL (ref 0–0.2)
CONV CO2: 26 MMOL/L (ref 22–32)
CONV IMMATURE GRAN: 0.3 % (ref 0–1.8)
CONV TOTAL PROTEIN: 7.7 G/DL (ref 6.3–8.2)
CREAT UR-MCNC: 0.5 MG/DL (ref 0.5–0.9)
DEPRECATED RDW RBC AUTO: 47.1 FL (ref 36.4–46.3)
EOSINOPHIL # BLD AUTO: 0.05 10*3/UL (ref 0–0.7)
EOSINOPHIL # BLD AUTO: 0.8 % (ref 0–7)
ERYTHROCYTE [DISTWIDTH] IN BLOOD BY AUTOMATED COUNT: 13.9 % (ref 11.7–14.4)
GFR SERPLBLD BASED ON 1.73 SQ M-ARVRAT: >60 ML/MIN/{1.73_M2}
GLOBULIN UR ELPH-MCNC: 3.4 G/DL (ref 2–3.5)
GLUCOSE SERPL-MCNC: 129 MG/DL (ref 65–99)
HCT VFR BLD AUTO: 47.9 % (ref 37–47)
HGB BLD-MCNC: 14.5 G/DL (ref 12–16)
LYMPHOCYTES # BLD AUTO: 1.25 10*3/UL (ref 1–5)
LYMPHOCYTES NFR BLD AUTO: 19.3 % (ref 20–45)
MCH RBC QN AUTO: 27.9 PG (ref 27–31)
MCHC RBC AUTO-ENTMCNC: 30.3 G/DL (ref 33–37)
MCV RBC AUTO: 92.1 FL (ref 81–99)
MONOCYTES # BLD AUTO: 0.34 10*3/UL (ref 0.2–1.2)
MONOCYTES NFR BLD AUTO: 5.3 % (ref 3–10)
NEUTROPHILS # BLD AUTO: 4.78 10*3/UL (ref 2–8)
NEUTROPHILS NFR BLD AUTO: 73.8 % (ref 30–85)
NRBC CBCN: 0 % (ref 0–0.7)
OSMOLALITY SERPL CALC.SUM OF ELEC: 288 MOSM/KG (ref 273–304)
PLATELET # BLD AUTO: 189 10*3/UL (ref 130–400)
PMV BLD AUTO: 11.1 FL (ref 9.4–12.3)
POTASSIUM SERPL-SCNC: 4.2 MMOL/L (ref 3.5–5.3)
RBC # BLD AUTO: 5.2 10*6/UL (ref 4.2–5.4)
SODIUM SERPL-SCNC: 138 MMOL/L (ref 135–147)
WBC # BLD AUTO: 6.47 10*3/UL (ref 4.8–10.8)

## 2020-02-17 ENCOUNTER — CONVERSION ENCOUNTER (OUTPATIENT)
Dept: INTERNAL MEDICINE | Facility: CLINIC | Age: 55
End: 2020-02-17

## 2020-02-17 ENCOUNTER — OFFICE VISIT CONVERTED (OUTPATIENT)
Dept: INTERNAL MEDICINE | Facility: CLINIC | Age: 55
End: 2020-02-17
Attending: PHYSICIAN ASSISTANT

## 2020-03-09 ENCOUNTER — OFFICE VISIT (OUTPATIENT)
Dept: PAIN MEDICINE | Facility: CLINIC | Age: 55
End: 2020-03-09

## 2020-03-09 VITALS
OXYGEN SATURATION: 97 % | BODY MASS INDEX: 26.3 KG/M2 | HEIGHT: 67 IN | DIASTOLIC BLOOD PRESSURE: 73 MMHG | SYSTOLIC BLOOD PRESSURE: 112 MMHG | WEIGHT: 167.6 LBS | HEART RATE: 88 BPM | RESPIRATION RATE: 16 BRPM | TEMPERATURE: 98 F

## 2020-03-09 DIAGNOSIS — Z97.8 PRESENCE OF INTRATHECAL PUMP: ICD-10-CM

## 2020-03-09 DIAGNOSIS — G89.4 CHRONIC PAIN SYNDROME: ICD-10-CM

## 2020-03-09 DIAGNOSIS — M25.551 RIGHT HIP PAIN: Primary | ICD-10-CM

## 2020-03-09 DIAGNOSIS — M54.41 CHRONIC LOW BACK PAIN WITH BILATERAL SCIATICA, UNSPECIFIED BACK PAIN LATERALITY: ICD-10-CM

## 2020-03-09 DIAGNOSIS — M19.90 ARTHRITIS: ICD-10-CM

## 2020-03-09 DIAGNOSIS — M54.42 CHRONIC LOW BACK PAIN WITH BILATERAL SCIATICA, UNSPECIFIED BACK PAIN LATERALITY: ICD-10-CM

## 2020-03-09 DIAGNOSIS — G89.29 CHRONIC LOW BACK PAIN WITH BILATERAL SCIATICA, UNSPECIFIED BACK PAIN LATERALITY: ICD-10-CM

## 2020-03-09 PROCEDURE — 99213 OFFICE O/P EST LOW 20 MIN: CPT | Performed by: ANESTHESIOLOGY

## 2020-03-09 PROCEDURE — 62368 ANALYZE SP INF PUMP W/REPROG: CPT | Performed by: ANESTHESIOLOGY

## 2020-03-09 RX ORDER — HYDROCODONE BITARTRATE AND ACETAMINOPHEN 5; 325 MG/1; MG/1
1 TABLET ORAL 2 TIMES DAILY PRN
Qty: 60 TABLET | Refills: 0 | Status: SHIPPED | OUTPATIENT
Start: 2020-03-09 | End: 2020-03-24

## 2020-03-09 NOTE — PROGRESS NOTES
CHIEF COMPLAINT  F/U Back pain- patient states that her back and hip pain has worsened during the night. She states that has not been able to sleep because of the pain. She would also like to discuss removing her old pump.     Subjective   Isha Llanes is a 54 y.o. female  who presents to the office for follow-up.She has a history of back pain and also hip pain.    ----    Intrathecal pump was interrogated in office today. Results are in chart.    The patient is currently at a dose of 4mg of hydromorphone per day. There is the addition of PTM bolus. This is set at 0.4 mg/actuation given over 3 minutes. The lock-out is set at 1 hours with a maximum activation of 6 in 24 hours. This is a 40ml pump & The expected residual volume of the pump was 17.3 ml.    Change the dosing to Flex dosing....  Minimal basal rate, dose 2mg at 4am & 12 noon & 8pm.              Back Pain   This is a chronic problem. The current episode started more than 1 year ago. The problem has been gradually worsening (Things to be chronically worsening at every office visit, but she has had some increased activity when she states that she and her family are moving to a new home any activity involved and that has been difficult) since onset. The pain is present in the lumbar spine. The quality of the pain is described as aching and stabbing. The pain is severe. The symptoms are aggravated by bending, sitting, standing, twisting and position. Associated symptoms include headaches, numbness (legs and feet bilaterally), pelvic pain (to see oncologist) and weakness (legs bilat.). Pertinent negatives include no abdominal pain, chest pain, dysuria or fever. Risk factors include lack of exercise, poor posture, sedentary lifestyle and history of cancer. She has tried analgesics, bed rest, heat and ice for the symptoms. The treatment provided mild relief.        PEG Assessment   What number best describes your pain on average in the past week?8  What  number best describes how, during the past week, pain has interfered with your enjoyment of life?9  What number best describes how, during the past week, pain has interfered with your general activity?  8    --  The aforementioned information the Chief Complaint section and above subjective data including any HPI data has been personally reviewed and affirmed.  --        The following portions of the patient's history were reviewed and updated as appropriate: allergies, current medications, past family history, past medical history, past social history, past surgical history and problem list.    -------    The following portions of the patient's history were reviewed and updated as appropriate: allergies, current medications, past family history, past medical history, past social history, past surgical history and problem list.    Allergies   Allergen Reactions   • Amoxicillin Shortness Of Breath   • Ceclor [Cefaclor] Shortness Of Breath   • Penicillins Shortness Of Breath   • Sulfa Antibiotics Rash   • Doxycycline GI Intolerance   • Compazine [Prochlorperazine Edisylate] Rash   • Contrast Dye Other (See Comments)     Caused pain in her arm   • Phenergan [Promethazine Hcl] Rash         Current Outpatient Medications:   •  ACCU-CHEK CURT PLUS test strip, SUGAR THREE TIMES DAILY, Disp: , Rfl: 11  •  ACCU-CHEK SOFTCLIX LANCETS lancets, USE TO TEST BLOOD SUGARS THREE TIMES DAILY, Disp: , Rfl: 11  •  acetaminophen (TYLENOL) 500 MG tablet, Take 1,000-1,500 mg by mouth Every 6 (Six) Hours As Needed for Mild Pain ., Disp: , Rfl:   •  albuterol (PROVENTIL HFA;VENTOLIN HFA) 108 (90 Base) MCG/ACT inhaler, Inhale 2 puffs Every 4 (Four) Hours As Needed for Wheezing., Disp: , Rfl:   •  ANTI-DIARRHEAL 2 MG tablet, TAKE 1 TABLET BY MOUTH EVERY 8 HOURS AS NEEDED FOR 10 DAYS, Disp: , Rfl: 0  •  Blood Glucose Monitoring Suppl (ACCU-CHEK GUIDE) w/Device kit, See Admin Instructions., Disp: , Rfl: 0  •  BREO ELLIPTA 200-25 MCG/INH  inhaler, inhale ONE puff BY MOUTH EVERY DAY, Disp: , Rfl:   •  carvedilol (COREG) 3.125 MG tablet, Take 3.125 mg by mouth 2 (Two) Times a Day., Disp: , Rfl: 2  •  clindamycin (CLEOCIN) 300 MG capsule, TAKE ONE CAPSULE BY MOUTH THREE TIMES DAILY for TEN DAYS, Disp: , Rfl: 0  •  DULoxetine (CYMBALTA) 20 MG capsule, Take 20 mg by mouth Daily., Disp: , Rfl:   •  furosemide (LASIX) 40 MG tablet, Take 40 mg by mouth Daily., Disp: , Rfl:   •  gabapentin (NEURONTIN) 300 MG capsule, Take 300 mg by mouth 3 (Three) Times a Day., Disp: , Rfl:   •  glimepiride (AMARYL) 2 MG tablet, Take 2 mg by mouth Daily., Disp: , Rfl: 1  •  HYDROmorphone (DILAUDID) 1 mg/mL solution, Infuse  into a venous catheter Dose Adjusted By Provider As Needed., Disp: , Rfl:   •  methylPREDNISolone (MEDROL, SHANIA,) 4 MG tablet, Take as directed on package instructions., Disp: 21 tablet, Rfl: 0  •  mupirocin (BACTROBAN) 2 % cream, Apply  topically to the appropriate area as directed 2 (Two) Times a Day., Disp: 15 g, Rfl: 0  •  mupirocin (BACTROBAN) 2 % ointment, USE ONE application intranasally TWICE DAILY, Disp: , Rfl: 1  •  naloxone (NARCAN) 4 MG/0.1ML nasal spray, 1 spray into the nostril(s) as directed by provider As Needed (for overdose)., Disp: 1 each, Rfl: 0  •  ondansetron (ZOFRAN) 4 MG tablet, Take 4 mg by mouth Every 8 (Eight) Hours As Needed., Disp: , Rfl: 0  •  ondansetron ODT (ZOFRAN ODT) 4 MG disintegrating tablet, Take 1-2 tablets by mouth Every 8 (Eight) Hours As Needed for Nausea or Vomiting., Disp: 60 tablet, Rfl: 0  •  vitamin D (ERGOCALCIFEROL) 1.25 MG (09277 UT) capsule capsule, Take 50,000 Units by mouth 1 (One) Time Per Week., Disp: , Rfl: 5  •  HYDROcodone-acetaminophen (NORCO) 5-325 MG per tablet, Take 1 tablet by mouth 2 (Two) Times a Day As Needed for Severe Pain ., Disp: 60 tablet, Rfl: 0    Current Outpatient Medications on File Prior to Visit   Medication Sig Dispense Refill   • ACCU-CHEK CURT PLUS test strip SUGAR THREE TIMES  DAILY  11   • ACCU-CHEK SOFTCLIX LANCETS lancets USE TO TEST BLOOD SUGARS THREE TIMES DAILY  11   • acetaminophen (TYLENOL) 500 MG tablet Take 1,000-1,500 mg by mouth Every 6 (Six) Hours As Needed for Mild Pain .     • albuterol (PROVENTIL HFA;VENTOLIN HFA) 108 (90 Base) MCG/ACT inhaler Inhale 2 puffs Every 4 (Four) Hours As Needed for Wheezing.     • ANTI-DIARRHEAL 2 MG tablet TAKE 1 TABLET BY MOUTH EVERY 8 HOURS AS NEEDED FOR 10 DAYS  0   • Blood Glucose Monitoring Suppl (ACCU-CHEK GUIDE) w/Device kit See Admin Instructions.  0   • BREO ELLIPTA 200-25 MCG/INH inhaler inhale ONE puff BY MOUTH EVERY DAY     • carvedilol (COREG) 3.125 MG tablet Take 3.125 mg by mouth 2 (Two) Times a Day.  2   • clindamycin (CLEOCIN) 300 MG capsule TAKE ONE CAPSULE BY MOUTH THREE TIMES DAILY for TEN DAYS  0   • DULoxetine (CYMBALTA) 20 MG capsule Take 20 mg by mouth Daily.     • furosemide (LASIX) 40 MG tablet Take 40 mg by mouth Daily.     • gabapentin (NEURONTIN) 300 MG capsule Take 300 mg by mouth 3 (Three) Times a Day.     • glimepiride (AMARYL) 2 MG tablet Take 2 mg by mouth Daily.  1   • HYDROmorphone (DILAUDID) 1 mg/mL solution Infuse  into a venous catheter Dose Adjusted By Provider As Needed.     • methylPREDNISolone (MEDROL, SHANIA,) 4 MG tablet Take as directed on package instructions. 21 tablet 0   • mupirocin (BACTROBAN) 2 % cream Apply  topically to the appropriate area as directed 2 (Two) Times a Day. 15 g 0   • mupirocin (BACTROBAN) 2 % ointment USE ONE application intranasally TWICE DAILY  1   • naloxone (NARCAN) 4 MG/0.1ML nasal spray 1 spray into the nostril(s) as directed by provider As Needed (for overdose). 1 each 0   • ondansetron (ZOFRAN) 4 MG tablet Take 4 mg by mouth Every 8 (Eight) Hours As Needed.  0   • ondansetron ODT (ZOFRAN ODT) 4 MG disintegrating tablet Take 1-2 tablets by mouth Every 8 (Eight) Hours As Needed for Nausea or Vomiting. 60 tablet 0   • vitamin D (ERGOCALCIFEROL) 1.25 MG (82032 UT) capsule  capsule Take 50,000 Units by mouth 1 (One) Time Per Week.  5     No current facility-administered medications on file prior to visit.        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)   • Other pulmonary embolism without acute cor pulmonale (CMS/HCC)   • Nonrheumatic aortic valve stenosis       Past Medical History:   Diagnosis Date   • Anxiety    • Anxiety    • Aortic stenosis, severe    • Arthritis    • Arthritis    • Asthma    • Back pain    • Blood clotting disorder (CMS/HCC)    • Cancer associated pain    • Chronic low back pain with sciatica    • Diabetes mellitus (CMS/HCC)     TYPE 2   • Dyspnea on effort    • GERD (gastroesophageal reflux disease)    • Hiatal hernia    • History of kidney stones    • History of transfusion    • Hodgkin's lymphoma (CMS/HCC)    • Immobility    • Low back pain    • Lupus (CMS/HCC)    • Mid back pain    • Other pulmonary embolism without acute cor pulmonale (CMS/HCC)    • Pelvic pain    • Peripheral neuropathy    • Presence of intrathecal pump    • Sacroiliac joint disease    • SI (sacroiliac) joint inflammation (CMS/HCC)        Past Surgical History:   Procedure Laterality Date   • BACK SURGERY     • BLADDER REPAIR     • CARDIAC CATHETERIZATION N/A 3/6/2019    Procedure: Valvuloplasty;  Surgeon: Wayne Johnson MD;  Location: [facility] INVASIVE LOCATION;  Service: Cardiology   • CHOLECYSTECTOMY     • HYSTERECTOMY     • LYMPHADENECTOMY     • PAIN PUMP INSERTION/REVISION     • PAIN PUMP INSERTION/REVISION N/A 10/18/2019    Procedure: PAIN PUMP INSERTION [facility] 10-18-19 [place];  Surgeon:  Horace Rios MD;  Location: [facility] OR;  Service: Pain Management   • TONSILLECTOMY     • TUBAL ABDOMINAL LIGATION     • TUMOR REMOVAL         Family History   Problem Relation Age of Onset   • Pancreatic cancer Sister    • Pancreatic cancer Paternal Grandmother    • Malig Hyperthermia Neg Hx        Social History     Socioeconomic History   • Marital status: Single     Spouse name: Not on file   • Number of children: Not on file   • Years of education: Not on file   • Highest education level: Not on file   Tobacco Use   • Smoking status: Current Every Day Smoker     Packs/day: 2.00     Years: 41.00     Pack years: 82.00     Types: Cigarettes   • Smokeless tobacco: Never Used   • Tobacco comment: patient refused   Substance and Sexual Activity   • Alcohol use: No   • Drug use: No   • Sexual activity: Defer     Birth control/protection: Surgical     Comment: KRYSTAL       -------        Review of Systems   Constitutional: Positive for activity change (decreased) and fatigue. Negative for chills and fever.   HENT: Negative for ear pain.    Eyes: Negative for pain.   Respiratory: Negative for chest tightness, shortness of breath and wheezing.    Cardiovascular: Positive for leg swelling. Negative for chest pain.   Gastrointestinal: Positive for constipation. Negative for abdominal pain, blood in stool, nausea and vomiting.   Genitourinary: Positive for pelvic pain (to see oncologist). Negative for difficulty urinating and dysuria.   Musculoskeletal: Positive for arthralgias, back pain, gait problem and neck pain. Negative for joint swelling.   Allergic/Immunologic: Negative for immunocompromised state.   Neurological: Positive for weakness (legs bilat.), numbness (legs and feet bilaterally) and headaches. Negative for dizziness and light-headedness.   Hematological: Does not bruise/bleed easily.   Psychiatric/Behavioral: Positive for agitation and sleep disturbance. Negative for behavioral problems and suicidal  "ideas. The patient is nervous/anxious.                        Vitals:    03/09/20 1408   BP: 112/73   Pulse: 88   Resp: 16   Temp: 98 °F (36.7 °C)   SpO2: 97%   Weight: 76 kg (167 lb 9.6 oz)   Height: 170.2 cm (67\")   PainSc:   7   PainLoc: Back         Objective   Physical Exam   Constitutional: She is oriented to person, place, and time. Vital signs are normal. She appears well-developed and well-nourished.  Non-toxic appearance. No distress.   HENT:   Head: Normocephalic and atraumatic.   Right Ear: Hearing and external ear normal.   Left Ear: Hearing and external ear normal.   Nose: Nose normal.   Eyes: Pupils are equal, round, and reactive to light. Conjunctivae and lids are normal.   Pulmonary/Chest: Effort normal. No respiratory distress.   Abdominal: Normal appearance.   Musculoskeletal: She exhibits tenderness.        Right hip: She exhibits decreased range of motion, decreased strength and crepitus. She exhibits no tenderness, no bony tenderness and no swelling.   She has poor posture.  She is using her electric wheelchair.  Significant tenderness in the lower back is noted.  Previous incisions from pump implant are well-healed.  The old pump site in the abdomen is well-healed but is mildly tender to touch.  There is pain on internal and external rotation of the right hip.   Neurological: She is alert and oriented to person, place, and time. No cranial nerve deficit.   Psychiatric: She has a normal mood and affect. Her behavior is normal. Judgment and thought content normal.   Nursing note and vitals reviewed.          Assessment/Plan   Isha was seen today for back pain.    Diagnoses and all orders for this visit:    Right hip pain  -     XR hip w or wo pelvis 2-3 view right; Future  -     Ambulatory Referral to Orthopedic Surgery    Chronic pain syndrome    Presence of intrathecal pump    Chronic low back pain with bilateral sciatica, unspecified back pain laterality    Arthritis  -     " HYDROcodone-acetaminophen (NORCO) 5-325 MG per tablet; Take 1 tablet by mouth 2 (Two) Times a Day As Needed for Severe Pain .      This office visit was of 18 minutes in duration, with 12 minutes spent in education and counseling about the surgical procedure for removal of intrathecal pump, including but not limited to alternative which would be to just leave the old device and not the surgery but she finds the previous pump in the abdomen to be uncomfortable and wants it removed, risk of the procedure including but not limited to bleeding and infection and injury and also the additional need for concern about current risks and she asked about the risk of acquiring coronavirus while in the hospital and we discussed that at this time we are operating business as usual but that could change at any time but stated precautions and sterile technique will be followed as usual.  She wants to proceed.    --- Follow-up for procedure.... Removal of her old intrathecal pump  -- changes to the IDDS dosing... 24 hr dose = 6.468mg per day       Discussed with the patient regarding long-term side effects of opioids including but not limited to dependence, addiction, sedation, respiratory depression, opioid induced hormonal suppression, hyperalgesia, and elevated risk of myocardial infarction.            TERRELL REPORT    As part of the patient's treatment plan, I am prescribing controlled substances. The patient has been made aware of appropriate use of such medications, including potential risk of somnolence, limited ability to drive and/or work safely, and the potential for dependence or overdose. It has also bee made clear that these medications are for use by this patient only, without concomitant use of alcohol or other substances unless prescribed.     Patient has completed prescribing agreement detailing terms of continued prescribing of controlled substances, including monitoring TERRELL reports, urine drug screening, and  pill counts if necessary. The patient is aware that inappropriate use will results in cessation of prescribing such medications.    TERRELL report has been reviewed and scanned into the patient's chart.    As the clinician, I personally reviewed the TERRELL from as above while the patient was in the office today.    History and physical exam exhibit continued safe and appropriate use of controlled substances.      EMR Dragon/Transcription disclaimer:   Much of this encounter note is an electronic transcription/translation of spoken language to printed text. The electronic translation of spoken language may permit erroneous, or at times, nonsensical words or phrases to be inadvertently transcribed; Although I have reviewed the note for such errors, some may still exist.

## 2020-03-10 ENCOUNTER — TELEPHONE (OUTPATIENT)
Dept: PAIN MEDICINE | Facility: CLINIC | Age: 55
End: 2020-03-10

## 2020-03-10 NOTE — TELEPHONE ENCOUNTER
Ms. Llanes called after her appointment yesterday and stated that if you would like to consider another medication she is ok with that as long it is equivalent to what she is on now. She didn't want you to think that she was not ok with the option of switching to another medication.

## 2020-03-18 NOTE — TELEPHONE ENCOUNTER
I have to order her medication by Friday. You probably have not had time to think about this so I just wanted to remind you.

## 2020-03-24 ENCOUNTER — TELEMEDICINE CONVERTED (OUTPATIENT)
Dept: INTERNAL MEDICINE | Facility: CLINIC | Age: 55
End: 2020-03-24
Attending: PHYSICIAN ASSISTANT

## 2020-03-24 ENCOUNTER — OFFICE VISIT (OUTPATIENT)
Dept: PAIN MEDICINE | Facility: CLINIC | Age: 55
End: 2020-03-24

## 2020-03-24 VITALS
HEART RATE: 87 BPM | TEMPERATURE: 97.1 F | RESPIRATION RATE: 16 BRPM | BODY MASS INDEX: 26.21 KG/M2 | OXYGEN SATURATION: 96 % | SYSTOLIC BLOOD PRESSURE: 138 MMHG | HEIGHT: 67 IN | WEIGHT: 167 LBS | DIASTOLIC BLOOD PRESSURE: 83 MMHG

## 2020-03-24 DIAGNOSIS — M53.3 SACROILIAC JOINT DYSFUNCTION: ICD-10-CM

## 2020-03-24 DIAGNOSIS — G89.4 CHRONIC PAIN SYNDROME: ICD-10-CM

## 2020-03-24 DIAGNOSIS — G89.29 CHRONIC LOW BACK PAIN WITH BILATERAL SCIATICA, UNSPECIFIED BACK PAIN LATERALITY: ICD-10-CM

## 2020-03-24 DIAGNOSIS — Z79.899 ENCOUNTER FOR LONG-TERM CURRENT USE OF HIGH RISK MEDICATION: ICD-10-CM

## 2020-03-24 DIAGNOSIS — M54.42 CHRONIC LOW BACK PAIN WITH BILATERAL SCIATICA, UNSPECIFIED BACK PAIN LATERALITY: ICD-10-CM

## 2020-03-24 DIAGNOSIS — M54.41 CHRONIC LOW BACK PAIN WITH BILATERAL SCIATICA, UNSPECIFIED BACK PAIN LATERALITY: ICD-10-CM

## 2020-03-24 DIAGNOSIS — Z97.8 PRESENCE OF INTRATHECAL PUMP: Primary | ICD-10-CM

## 2020-03-24 PROCEDURE — 99213 OFFICE O/P EST LOW 20 MIN: CPT | Performed by: ANESTHESIOLOGY

## 2020-03-24 PROCEDURE — 62369 ANAL SP INF PMP W/REPRG&FILL: CPT | Performed by: ANESTHESIOLOGY

## 2020-03-24 NOTE — PROGRESS NOTES
CHIEF COMPLAINT  F/U Back pain and pump refill- patient states that her pain has remained the same    Subjective   Isha Llanes is a 54 y.o. female  who presents to the office for follow-up.She has a history of significant spine related pain also a history of cancer related pain and cancer treatment associated pain.    She has aching pain with sitting and previously has evidence of sacroiliac dysfunction and this lumbosacral/sacroiliac pain comes and goes.  She can get out of pain if she gets out of her electric wheelchair and if she bends down and see squats, and she actually does this maneuver on physical exam, where she is taking pressure off of her sacroiliac area and it very significantly relieves her back pain.    Back Pain   This is a chronic problem. The current episode started more than 1 year ago. The problem is unchanged. The pain is present in the lumbar spine. The quality of the pain is described as aching and stabbing. The pain is moderate. The symptoms are aggravated by bending, sitting, standing, twisting and position. Associated symptoms include headaches, numbness (legs and feet bilaterally), pelvic pain (to see oncologist) and weakness (legs bilat.). Pertinent negatives include no abdominal pain, chest pain, dysuria or fever. Risk factors include lack of exercise, poor posture, sedentary lifestyle and history of cancer. She has tried analgesics, bed rest, heat and ice for the symptoms. The treatment provided mild relief.        She reviews that the changes to her intrathecal pump at the last office visit have not been successful and she feels like she has been in more pain all the time.    This is the pump interrogation and refill note…   in review prior to the last office visit she was receiving 4 mg a day as a constant infusion and then PTM dosing was 0.4 mg with lockout every 1 hour maximum 6/day.  At the last office visit we changed it to flex dosing where she was getting 2 mg infusion  at 4 AM and noon and 8 PM.  The basal rate on that flex dose was set at a minimum therefore her total daily dose came down to 6.468 mg/day.  This is being dosed by the concentration of hydromorphone which is 10 mg/mL.  Secondarily there has been bupivacaine 0.5 mg/mL.    The pump was easily accessed today under sterile conditions and technique and the expected residual 7.8 mL and the actual was 9 mL.  It was then refilled with 40 mL of hydromorphone solution at a new concentration of hydromorphone 15 mg/mL, with bupivacaine 0.5 mg/mL.  The programming was restored back to simple infusion with PTM, and the simple infusion is 5 mg/day and and the PTM is 0.2 mg with a 1 hour lockout maximum 6 activations per day.    Regarding her old intrathecal pump implant in the abdomen, she notes that she still occasionally hears the device beep.  The [employer] representative met with all of us today and shut down the pump as noted below.    PEG Assessment   What number best describes your pain on average in the past week?7  What number best describes how, during the past week, pain has interfered with your enjoyment of life?8  What number best describes how, during the past week, pain has interfered with your general activity?  8    --  The aforementioned information the Chief Complaint section and above subjective data including any HPI data has been personally reviewed and affirmed.  --        The following portions of the patient's history were reviewed and updated as appropriate: allergies, current medications, past family history, past medical history, past social history, past surgical history and problem list.    -------    The following portions of the patient's history were reviewed and updated as appropriate: allergies, current medications, past family history, past medical history, past social history, past surgical history and problem list.    Allergies   Allergen Reactions   • Amoxicillin Shortness Of Breath   •  Ceclor [Cefaclor] Shortness Of Breath   • Penicillins Shortness Of Breath   • Sulfa Antibiotics Rash   • Doxycycline GI Intolerance   • Compazine [Prochlorperazine Edisylate] Rash   • Contrast Dye Other (See Comments)     Caused pain in her arm   • Phenergan [Promethazine Hcl] Rash         Current Outpatient Medications:   •  ACCU-CHEK CURT PLUS test strip, SUGAR THREE TIMES DAILY, Disp: , Rfl: 11  •  ACCU-CHEK SOFTCLIX LANCETS lancets, USE TO TEST BLOOD SUGARS THREE TIMES DAILY, Disp: , Rfl: 11  •  acetaminophen (TYLENOL) 500 MG tablet, Take 1,000-1,500 mg by mouth Every 6 (Six) Hours As Needed for Mild Pain ., Disp: , Rfl:   •  albuterol (PROVENTIL HFA;VENTOLIN HFA) 108 (90 Base) MCG/ACT inhaler, Inhale 2 puffs Every 4 (Four) Hours As Needed for Wheezing., Disp: , Rfl:   •  ANTI-DIARRHEAL 2 MG tablet, TAKE 1 TABLET BY MOUTH EVERY 8 HOURS AS NEEDED FOR 10 DAYS, Disp: , Rfl: 0  •  Blood Glucose Monitoring Suppl (ACCU-CHEK GUIDE) w/Device kit, See Admin Instructions., Disp: , Rfl: 0  •  BREO ELLIPTA 200-25 MCG/INH inhaler, inhale ONE puff BY MOUTH EVERY DAY, Disp: , Rfl:   •  carvedilol (COREG) 3.125 MG tablet, Take 3.125 mg by mouth 2 (Two) Times a Day., Disp: , Rfl: 2  •  clindamycin (CLEOCIN) 300 MG capsule, TAKE ONE CAPSULE BY MOUTH THREE TIMES DAILY for TEN DAYS, Disp: , Rfl: 0  •  DULoxetine (CYMBALTA) 20 MG capsule, Take 20 mg by mouth Daily., Disp: , Rfl:   •  furosemide (LASIX) 40 MG tablet, Take 40 mg by mouth Daily., Disp: , Rfl:   •  gabapentin (NEURONTIN) 300 MG capsule, Take 300 mg by mouth 2 (Two) Times a Day., Disp: , Rfl:   •  glimepiride (AMARYL) 2 MG tablet, Take 2 mg by mouth Daily., Disp: , Rfl: 1  •  HYDROmorphone (DILAUDID) 1 mg/mL solution, Infuse  into a venous catheter Dose Adjusted By Provider As Needed., Disp: , Rfl:   •  methylPREDNISolone (MEDROL, SHANIA,) 4 MG tablet, Take as directed on package instructions., Disp: 21 tablet, Rfl: 0  •  mupirocin (BACTROBAN) 2 % cream, Apply  topically  to the appropriate area as directed 2 (Two) Times a Day., Disp: 15 g, Rfl: 0  •  mupirocin (BACTROBAN) 2 % ointment, USE ONE application intranasally TWICE DAILY, Disp: , Rfl: 1  •  naloxone (NARCAN) 4 MG/0.1ML nasal spray, 1 spray into the nostril(s) as directed by provider As Needed (for overdose)., Disp: 1 each, Rfl: 0  •  ondansetron (ZOFRAN) 4 MG tablet, Take 4 mg by mouth Every 8 (Eight) Hours As Needed., Disp: , Rfl: 0  •  ondansetron ODT (ZOFRAN ODT) 4 MG disintegrating tablet, Take 1-2 tablets by mouth Every 8 (Eight) Hours As Needed for Nausea or Vomiting., Disp: 60 tablet, Rfl: 0  •  vitamin D (ERGOCALCIFEROL) 1.25 MG (09018 UT) capsule capsule, Take 50,000 Units by mouth 1 (One) Time Per Week., Disp: , Rfl: 5    Current Outpatient Medications on File Prior to Visit   Medication Sig Dispense Refill   • ACCU-CHEK CURT PLUS test strip SUGAR THREE TIMES DAILY  11   • ACCU-CHEK SOFTCLIX LANCETS lancets USE TO TEST BLOOD SUGARS THREE TIMES DAILY  11   • acetaminophen (TYLENOL) 500 MG tablet Take 1,000-1,500 mg by mouth Every 6 (Six) Hours As Needed for Mild Pain .     • albuterol (PROVENTIL HFA;VENTOLIN HFA) 108 (90 Base) MCG/ACT inhaler Inhale 2 puffs Every 4 (Four) Hours As Needed for Wheezing.     • ANTI-DIARRHEAL 2 MG tablet TAKE 1 TABLET BY MOUTH EVERY 8 HOURS AS NEEDED FOR 10 DAYS  0   • Blood Glucose Monitoring Suppl (ACCU-CHEK GUIDE) w/Device kit See Admin Instructions.  0   • BREO ELLIPTA 200-25 MCG/INH inhaler inhale ONE puff BY MOUTH EVERY DAY     • carvedilol (COREG) 3.125 MG tablet Take 3.125 mg by mouth 2 (Two) Times a Day.  2   • clindamycin (CLEOCIN) 300 MG capsule TAKE ONE CAPSULE BY MOUTH THREE TIMES DAILY for TEN DAYS  0   • DULoxetine (CYMBALTA) 20 MG capsule Take 20 mg by mouth Daily.     • furosemide (LASIX) 40 MG tablet Take 40 mg by mouth Daily.     • gabapentin (NEURONTIN) 300 MG capsule Take 300 mg by mouth 2 (Two) Times a Day.     • glimepiride (AMARYL) 2 MG tablet Take 2 mg by  mouth Daily.  1   • HYDROmorphone (DILAUDID) 1 mg/mL solution Infuse  into a venous catheter Dose Adjusted By Provider As Needed.     • methylPREDNISolone (MEDROL, SHANIA,) 4 MG tablet Take as directed on package instructions. 21 tablet 0   • mupirocin (BACTROBAN) 2 % cream Apply  topically to the appropriate area as directed 2 (Two) Times a Day. 15 g 0   • mupirocin (BACTROBAN) 2 % ointment USE ONE application intranasally TWICE DAILY  1   • naloxone (NARCAN) 4 MG/0.1ML nasal spray 1 spray into the nostril(s) as directed by provider As Needed (for overdose). 1 each 0   • ondansetron (ZOFRAN) 4 MG tablet Take 4 mg by mouth Every 8 (Eight) Hours As Needed.  0   • ondansetron ODT (ZOFRAN ODT) 4 MG disintegrating tablet Take 1-2 tablets by mouth Every 8 (Eight) Hours As Needed for Nausea or Vomiting. 60 tablet 0   • vitamin D (ERGOCALCIFEROL) 1.25 MG (75542 UT) capsule capsule Take 50,000 Units by mouth 1 (One) Time Per Week.  5   • [DISCONTINUED] HYDROcodone-acetaminophen (NORCO) 5-325 MG per tablet Take 1 tablet by mouth 2 (Two) Times a Day As Needed for Severe Pain . 60 tablet 0     No current facility-administered medications on file prior to visit.        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)   • Other pulmonary embolism without acute cor pulmonale (CMS/HCC)   • Nonrheumatic aortic valve stenosis       Past Medical History:   Diagnosis Date   • Anxiety    • Anxiety    • Aortic stenosis, severe    • Arthritis    • Arthritis    • Asthma    • Back pain    • Blood clotting disorder (CMS/HCC)    • Cancer associated pain    • Chronic low back  pain with sciatica    • Diabetes mellitus (CMS/HCC)     TYPE 2   • Dyspnea on effort    • GERD (gastroesophageal reflux disease)    • Hiatal hernia    • History of kidney stones    • History of transfusion    • Hodgkin's lymphoma (CMS/HCC)    • Immobility    • Low back pain    • Lupus (CMS/HCC)    • Mid back pain    • Other pulmonary embolism without acute cor pulmonale (CMS/HCC)    • Pelvic pain    • Peripheral neuropathy    • Presence of intrathecal pump    • Sacroiliac joint disease    • SI (sacroiliac) joint inflammation (CMS/HCC)        Past Surgical History:   Procedure Laterality Date   • BACK SURGERY     • BLADDER REPAIR     • CARDIAC CATHETERIZATION N/A 3/6/2019    Procedure: Valvuloplasty;  Surgeon: Wayne Johnson MD;  Location: [facility] CATH INVASIVE LOCATION;  Service: Cardiology   • CHOLECYSTECTOMY     • HYSTERECTOMY     • LYMPHADENECTOMY     • PAIN PUMP INSERTION/REVISION     • PAIN PUMP INSERTION/REVISION N/A 10/18/2019    Procedure: PAIN PUMP INSERTION [facility] 10-18-19 [place];  Surgeon: Horace Rios MD;  Location: [facility] MAIN OR;  Service: Pain Management   • TONSILLECTOMY     • TUBAL ABDOMINAL LIGATION     • TUMOR REMOVAL         Family History   Problem Relation Age of Onset   • Pancreatic cancer Sister    • Pancreatic cancer Paternal Grandmother    • Malig Hyperthermia Neg Hx        Social History     Socioeconomic History   • Marital status: Single     Spouse name: Not on file   • Number of children: Not on file   • Years of education: Not on file   • Highest education level: Not on file   Tobacco Use   • Smoking status: Current Every Day Smoker     Packs/day: 2.00     Years: 41.00     Pack years: 82.00     Types: Cigarettes   • Smokeless tobacco: Never Used   • Tobacco comment: patient refused   Substance and Sexual Activity   • Alcohol use: No   • Drug use: No   • Sexual activity: Defer     Birth control/protection: Surgical     Comment: [facility]       -------        Review of Systems  "  Constitutional: Positive for activity change (decreased) and fatigue. Negative for chills and fever.   HENT: Negative for ear pain.    Eyes: Negative for pain.   Respiratory: Negative for chest tightness, shortness of breath and wheezing.    Cardiovascular: Positive for leg swelling. Negative for chest pain.   Gastrointestinal: Positive for constipation. Negative for abdominal pain, blood in stool, diarrhea, nausea and vomiting.   Genitourinary: Positive for pelvic pain (to see oncologist). Negative for difficulty urinating and dysuria.   Musculoskeletal: Positive for arthralgias, back pain, gait problem and neck pain. Negative for joint swelling.   Allergic/Immunologic: Negative for immunocompromised state.   Neurological: Positive for weakness (legs bilat.), numbness (legs and feet bilaterally) and headaches. Negative for dizziness and light-headedness.   Hematological: Does not bruise/bleed easily.   Psychiatric/Behavioral: Positive for agitation and sleep disturbance. Negative for behavioral problems and suicidal ideas. The patient is nervous/anxious.        Vitals:    03/24/20 1051   BP: 138/83   Pulse: 87   Resp: 16   Temp: 97.1 °F (36.2 °C)   SpO2: 96%   Weight: 75.8 kg (167 lb)   Height: 170.2 cm (67\")   PainSc:   7   PainLoc: Back         Objective   Physical Exam  VSS, NNR, NCAT, NMNA, NRD, AAOx3.  There is no induration or erythema around the incision sites on her back including the midline in the pump pocket site.      Assessment/Plan   Isha was seen today for back pain.    Diagnoses and all orders for this visit:    Presence of intrathecal pump    Chronic pain syndrome    Chronic low back pain with bilateral sciatica, unspecified back pain laterality    Encounter for long-term current use of high risk medication    Sacroiliac joint dysfunction        --- Follow-up PRN for next pump refill  --Today's plan refill settings and changes are included above in the pump refill note.    --We had " previously considered sacroiliac injection for heart rate but due to the coronavirus restrictions we cannot perform an elective procedure like this in the [facility] at this time.  Again, sacroiliac injection is the medical plan along with the continuation/modification gabapentin..      --Also, she still desires to have her old intrathecal pump battery removed which is something that would be indicated under normal time but again we cannot do that at this time due to the coronavirus mandates    --With increased pain despite the oral medication use, it is hard to say any clear role for the hydrocodone so we will not plan to refill that  --She also notes that she has had some occasional sedation and she is instructed to decrease the gabapentin use to twice daily dosing,     --Also today she did meet with the [employer] representative, Eulogio, and he interrogated her old pump, confirmed that it was in an end-of-life mode, past the useful data the battery.  He was able to interrogate that pump computer and he was able to run shutdown.    --Her next refill date is June 24.    TERRELL REPORT    As part of the patient's treatment plan, I am prescribing controlled substances. The patient has been made aware of appropriate use of such medications, including potential risk of somnolence, limited ability to drive and/or work safely, and the potential for dependence or overdose. It has also bee made clear that these medications are for use by this patient only, without concomitant use of alcohol or other substances unless prescribed.     Patient has completed prescribing agreement detailing terms of continued prescribing of controlled substances, including monitoring TERRELL reports, urine drug screening, and pill counts if necessary. The patient is aware that inappropriate use will results in cessation of prescribing such medications.    TERRELL report has been reviewed and scanned into the patient's chart.    As the  clinician, I personally reviewed the TERRELL from yesterday while the patient was in the office today.  AILEEN Figueroa report is a 3 page report with a request numbered [id number].  The gabapentin has been prescribed by her primary care provider.  Besides the PCP-there has been no other prescribers on her [employer] report.    History and physical exam exhibit continued safe and appropriate use of controlled substances.      EM[employer] Dragon/Transcription disclaimer:   Much of this encounter note is an electronic transcription/translation of spoken language to printed text. The electronic translation of spoken language may permit erroneous, or at times, nonsensical words or phrases to be inadvertently transcribed; Although I have reviewed the note for such errors, some may still exist.

## 2020-04-13 ENCOUNTER — TELEPHONE (OUTPATIENT)
Dept: PAIN MEDICINE | Facility: CLINIC | Age: 55
End: 2020-04-13

## 2020-04-13 ENCOUNTER — HOSPITAL ENCOUNTER (OUTPATIENT)
Dept: OTHER | Facility: HOSPITAL | Age: 55
Discharge: HOME | End: 2020-04-13
Attending: NURSE PRACTITIONER

## 2020-04-13 ENCOUNTER — OFFICE VISIT CONVERTED (OUTPATIENT)
Dept: INTERNAL MEDICINE | Facility: CLINIC | Age: 55
End: 2020-04-13
Attending: NURSE PRACTITIONER

## 2020-04-13 NOTE — TELEPHONE ENCOUNTER
Patient called and asked if she can have a refill on the norco. I saw in your note that you did not plan to refill. Please advise.

## 2020-04-13 NOTE — TELEPHONE ENCOUNTER
As she was complaining of increased pain despite his use I do not think it was really helping.  We increased her pump at the last visit.  And I think we need to refill the oral.

## 2020-04-14 ENCOUNTER — TELEPHONE (OUTPATIENT)
Dept: PAIN MEDICINE | Facility: CLINIC | Age: 55
End: 2020-04-14

## 2020-04-14 RX ORDER — HYDROCODONE BITARTRATE AND ACETAMINOPHEN 7.5; 325 MG/1; MG/1
1 TABLET ORAL NIGHTLY PRN
Qty: 30 TABLET | Refills: 0 | Status: SHIPPED | OUTPATIENT
Start: 2020-04-14 | End: 2020-05-11

## 2020-04-14 NOTE — PROGRESS NOTES
He had previously decided not to continue hydrocodone therapy.  She is having some significant hip pain.  Much difficulty sleeping at night.  She does continue to utilize intrathecal pump therapy along with PTM bolusing.  To try to help her to be able to get comfortable and get some rest at night it may be reasonable to trial some different medication just at night.  Hydrocodone 7.5 mg is called in nightly as needed.  This is at least a trial, acutely, until she can get into see the orthopedist.

## 2020-04-14 NOTE — TELEPHONE ENCOUNTER
Patient stated that she didn't say that it is not improving her pain. She stated that she said the way the bolus had been set up that it was working. She states that it does help her pain. She states that she has an appt on 5/25/20 with the orthopedist and that her hip pain isn't allowing her to sleep. Please advise.

## 2020-04-28 ENCOUNTER — TELEMEDICINE CONVERTED (OUTPATIENT)
Dept: INTERNAL MEDICINE | Facility: CLINIC | Age: 55
End: 2020-04-28
Attending: INTERNAL MEDICINE

## 2020-05-07 ENCOUNTER — TELEPHONE CONVERTED (OUTPATIENT)
Dept: INTERNAL MEDICINE | Facility: CLINIC | Age: 55
End: 2020-05-07
Attending: PHYSICIAN ASSISTANT

## 2020-05-08 ENCOUNTER — TELEPHONE (OUTPATIENT)
Dept: PAIN MEDICINE | Facility: CLINIC | Age: 55
End: 2020-05-08

## 2020-05-08 NOTE — TELEPHONE ENCOUNTER
Pt called in today requesting breakthrough meds for severe back, right hip, and bilat leg pain. Would you like to prescribe her any pain meds? Please advise. Thank you.

## 2020-05-11 RX ORDER — HYDROCODONE BITARTRATE AND ACETAMINOPHEN 7.5; 325 MG/1; MG/1
1 TABLET ORAL 2 TIMES DAILY PRN
Qty: 60 TABLET | Refills: 0 | Status: SHIPPED | OUTPATIENT
Start: 2020-05-11 | End: 2020-06-15 | Stop reason: SDUPTHER

## 2020-05-21 ENCOUNTER — TELEPHONE CONVERTED (OUTPATIENT)
Dept: INTERNAL MEDICINE | Facility: CLINIC | Age: 55
End: 2020-05-21
Attending: PHYSICIAN ASSISTANT

## 2020-05-28 ENCOUNTER — HOSPITAL ENCOUNTER (OUTPATIENT)
Dept: OTHER | Facility: HOSPITAL | Age: 55
Discharge: HOME | End: 2020-05-28
Attending: PHYSICIAN ASSISTANT

## 2020-05-28 ENCOUNTER — CONVERSION ENCOUNTER (OUTPATIENT)
Dept: INTERNAL MEDICINE | Facility: CLINIC | Age: 55
End: 2020-05-28

## 2020-05-28 ENCOUNTER — OFFICE VISIT CONVERTED (OUTPATIENT)
Dept: INTERNAL MEDICINE | Facility: CLINIC | Age: 55
End: 2020-05-28
Attending: PHYSICIAN ASSISTANT

## 2020-05-28 LAB
ALBUMIN SERPL-MCNC: 4.4 G/DL (ref 3.5–5)
ALBUMIN/GLOB SERPL: 1.4 {RATIO} (ref 1.4–2.6)
ALP SERPL-CCNC: 93 U/L (ref 53–141)
ALT SERPL-CCNC: 13 U/L (ref 10–40)
ANION GAP SERPL CALC-SCNC: 15 MMOL/L (ref 8–19)
AST SERPL-CCNC: 16 U/L (ref 15–50)
BASOPHILS # BLD AUTO: 0.02 10*3/UL (ref 0–0.2)
BASOPHILS NFR BLD AUTO: 0.3 % (ref 0–3)
BILIRUB SERPL-MCNC: 0.19 MG/DL (ref 0.2–1.3)
BNP SERPL-MCNC: 299 PG/ML (ref 0–900)
BUN SERPL-MCNC: 12 MG/DL (ref 5–25)
BUN/CREAT SERPL: 22 {RATIO} (ref 6–20)
CALCIUM SERPL-MCNC: 9.4 MG/DL (ref 8.7–10.4)
CHLORIDE SERPL-SCNC: 97 MMOL/L (ref 99–111)
CHOLEST SERPL-MCNC: 119 MG/DL (ref 107–200)
CHOLEST/HDLC SERPL: 4.4 {RATIO} (ref 3–6)
CONV ABS IMM GRAN: 0.03 10*3/UL (ref 0–0.2)
CONV CO2: 27 MMOL/L (ref 22–32)
CONV IMMATURE GRAN: 0.5 % (ref 0–1.8)
CONV TOTAL PROTEIN: 7.5 G/DL (ref 6.3–8.2)
CREAT UR-MCNC: 0.55 MG/DL (ref 0.5–0.9)
DEPRECATED RDW RBC AUTO: 49 FL (ref 36.4–46.3)
EOSINOPHIL # BLD AUTO: 0.12 10*3/UL (ref 0–0.7)
EOSINOPHIL # BLD AUTO: 2.1 % (ref 0–7)
ERYTHROCYTE [DISTWIDTH] IN BLOOD BY AUTOMATED COUNT: 14.5 % (ref 11.7–14.4)
EST. AVERAGE GLUCOSE BLD GHB EST-MCNC: 146 MG/DL
GFR SERPLBLD BASED ON 1.73 SQ M-ARVRAT: >60 ML/MIN/{1.73_M2}
GLOBULIN UR ELPH-MCNC: 3.1 G/DL (ref 2–3.5)
GLUCOSE SERPL-MCNC: 101 MG/DL (ref 65–99)
HBA1C MFR BLD: 6.7 % (ref 3.5–5.7)
HCT VFR BLD AUTO: 44.9 % (ref 37–47)
HDLC SERPL-MCNC: 27 MG/DL (ref 40–60)
HGB BLD-MCNC: 13.8 G/DL (ref 12–16)
LDLC SERPL CALC-MCNC: 44 MG/DL (ref 70–100)
LYMPHOCYTES # BLD AUTO: 1.61 10*3/UL (ref 1–5)
LYMPHOCYTES NFR BLD AUTO: 28 % (ref 20–45)
MCH RBC QN AUTO: 28.5 PG (ref 27–31)
MCHC RBC AUTO-ENTMCNC: 30.7 G/DL (ref 33–37)
MCV RBC AUTO: 92.6 FL (ref 81–99)
MONOCYTES # BLD AUTO: 0.57 10*3/UL (ref 0.2–1.2)
MONOCYTES NFR BLD AUTO: 9.9 % (ref 3–10)
NEUTROPHILS # BLD AUTO: 3.4 10*3/UL (ref 2–8)
NEUTROPHILS NFR BLD AUTO: 59.2 % (ref 30–85)
NRBC CBCN: 0 % (ref 0–0.7)
OSMOLALITY SERPL CALC.SUM OF ELEC: 280 MOSM/KG (ref 273–304)
PLATELET # BLD AUTO: 181 10*3/UL (ref 130–400)
PMV BLD AUTO: 11 FL (ref 9.4–12.3)
POTASSIUM SERPL-SCNC: 3.9 MMOL/L (ref 3.5–5.3)
RBC # BLD AUTO: 4.85 10*6/UL (ref 4.2–5.4)
SODIUM SERPL-SCNC: 135 MMOL/L (ref 135–147)
TRIGL SERPL-MCNC: 241 MG/DL (ref 40–150)
TSH SERPL-ACNC: 2.68 M[IU]/L (ref 0.27–4.2)
VLDLC SERPL-MCNC: 48 MG/DL (ref 5–37)
WBC # BLD AUTO: 5.75 10*3/UL (ref 4.8–10.8)

## 2020-05-30 LAB — BACTERIA SPEC AEROBE CULT: NORMAL

## 2020-06-08 ENCOUNTER — CONVERSION ENCOUNTER (OUTPATIENT)
Dept: ORTHOPEDIC SURGERY | Facility: CLINIC | Age: 55
End: 2020-06-08

## 2020-06-08 ENCOUNTER — OFFICE VISIT CONVERTED (OUTPATIENT)
Dept: ORTHOPEDIC SURGERY | Facility: CLINIC | Age: 55
End: 2020-06-08
Attending: ORTHOPAEDIC SURGERY

## 2020-06-10 ENCOUNTER — TELEMEDICINE CONVERTED (OUTPATIENT)
Dept: INTERNAL MEDICINE | Facility: CLINIC | Age: 55
End: 2020-06-10
Attending: PHYSICIAN ASSISTANT

## 2020-06-15 DIAGNOSIS — M16.11 ARTHRITIS OF RIGHT HIP: Primary | ICD-10-CM

## 2020-06-15 RX ORDER — HYDROCODONE BITARTRATE AND ACETAMINOPHEN 7.5; 325 MG/1; MG/1
1 TABLET ORAL 2 TIMES DAILY PRN
Qty: 60 TABLET | Refills: 0 | Status: SHIPPED | OUTPATIENT
Start: 2020-06-15 | End: 2020-07-15 | Stop reason: SDUPTHER

## 2020-06-15 NOTE — TELEPHONE ENCOUNTER
Medication Refill Request    Date of phone call: 6/15/20    Medication being requested: norco 7.5/325mg si tab po bid prn   Qty: 60    Date of last visit: 3/24/20    Date of last refill: 20    TERRELL up to date?: yes    Next Follow up?: 20    Any new pertinent information? (i.e, new medication allergies, new use of medications, change in patient's health or condition, non-compliance or inconsistency with prescribing agreement?): pt called in today to let you know she never heard from Dr. Rosario's office after referral in March so she found an ortho surgeon in [facility], had appt and XR of right hip shows worsening arthritis of right hip and bone spurs she said the ortho surgeon told her she is too high risk and not a candidate for surgery and was wondering if she can have a right hip injection under fluro? Pt sts she will contact Dr. Rosario's office today to schedule an appt for a second opinion. Please advise. Thank you.

## 2020-06-15 NOTE — TELEPHONE ENCOUNTER
I will contact Dr. Gottlieb Been office and obtain records today. I called pt to let her know hip inj ordered.

## 2020-06-17 ENCOUNTER — TELEMEDICINE CONVERTED (OUTPATIENT)
Dept: INTERNAL MEDICINE | Facility: CLINIC | Age: 55
End: 2020-06-17
Attending: PHYSICIAN ASSISTANT

## 2020-06-23 ENCOUNTER — OFFICE VISIT (OUTPATIENT)
Dept: PAIN MEDICINE | Facility: CLINIC | Age: 55
End: 2020-06-23

## 2020-06-23 VITALS
HEIGHT: 67 IN | TEMPERATURE: 98.2 F | BODY MASS INDEX: 26.21 KG/M2 | RESPIRATION RATE: 20 BRPM | SYSTOLIC BLOOD PRESSURE: 101 MMHG | WEIGHT: 167 LBS | HEART RATE: 89 BPM | DIASTOLIC BLOOD PRESSURE: 68 MMHG | OXYGEN SATURATION: 95 %

## 2020-06-23 DIAGNOSIS — G89.4 CHRONIC PAIN SYNDROME: Primary | ICD-10-CM

## 2020-06-23 DIAGNOSIS — M25.559 HIP PAIN: ICD-10-CM

## 2020-06-23 DIAGNOSIS — Z97.8 PRESENCE OF INTRATHECAL PUMP: ICD-10-CM

## 2020-06-23 DIAGNOSIS — M46.1 SACROILIAC INFLAMMATION (HCC): ICD-10-CM

## 2020-06-23 PROCEDURE — 99214 OFFICE O/P EST MOD 30 MIN: CPT | Performed by: NURSE PRACTITIONER

## 2020-06-23 RX ORDER — GABAPENTIN 300 MG/1
300 CAPSULE ORAL 3 TIMES DAILY
Qty: 90 CAPSULE | Refills: 1 | Status: SHIPPED | OUTPATIENT
Start: 2020-06-23 | End: 2020-08-26 | Stop reason: SDUPTHER

## 2020-06-23 RX ORDER — SILVER SULFADIAZINE 1 %
1 CREAM (GRAM) TOPICAL 2 TIMES DAILY
COMMUNITY
Start: 2020-05-19 | End: 2020-11-20

## 2020-06-23 RX ORDER — BACITRACIN ZINC 500 [USP'U]/G
OINTMENT TOPICAL 2 TIMES DAILY
COMMUNITY
Start: 2020-06-17 | End: 2020-11-20

## 2020-06-23 RX ORDER — ALOGLIPTIN 12.5 MG/1
12.5 TABLET, FILM COATED ORAL
COMMUNITY
Start: 2020-06-18 | End: 2020-11-20

## 2020-06-23 RX ORDER — NYSTATIN 100000 [USP'U]/G
POWDER TOPICAL AS NEEDED
COMMUNITY
Start: 2020-06-18 | End: 2021-07-14

## 2020-06-23 RX ORDER — POLYMYXIN B SULFATE AND TRIMETHOPRIM 1; 10000 MG/ML; [USP'U]/ML
SOLUTION OPHTHALMIC
COMMUNITY
Start: 2020-06-17 | End: 2020-11-20

## 2020-06-23 NOTE — PROGRESS NOTES
CHIEF COMPLAINT  F/u back pain. Pt sts pain has worsened since last ov. Pt here for pump refill today.     Subjective   Isha Llanes is a 54 y.o. female  who presents for follow-up.She has a history of chronic back pain. Reports her pain is worse since last office visit.    The patient was changed to simple infusion with PTM, simple infusion is 5 mg/day and the PTM is 0.2 mg with 1 hour lockout for maximum of 6 activations per day her maximum daily dose per day was 6.163 mg/day/Dilaudid/Bupivicaine.  In early March she was changed to flex dosing.  At that time she was getting 2 mg of infusion at 4 AM, noon, and 8 PM.  The basal rate on the flex dose was set at a minimum therefore her total daily dose was 6.468 mg/day.    Having increased back and hip pain. Saw an orthopedist in [place]. Would not do surgery. Wanted referral to someone in [place]. Referred to Dr Rosario, has an appt tomorrow but states she didn't know about this. Has to check on transportation.    Complains of pain in her low back and hips. Today her pain is 7/10VAS. Describes her pain as continuous aching and throbbing. Pain increases with walking, standing, activity; pain decreases with medication and rest. She states her PCP told her not to do PT. She is pending an appt with orthopedist. She also takes Hydrocodone 7.5/325 2/day, gabapentin 300 mg TID(reports she had lots of 600 mg left over and was breaking in half)(reports her new PCP wants pain management to take over this prescriptions- Dr Phelps at [facility]). Reports the medication relieves her pain minimally. Denies any side effects from the regimen. Except she later states she noticed a slight increase in her constipation. Denies any bowel or bladder changes.    Patient remained masked during entire encounter. No cough present. I donned a mask and gloves throughout entire visit. Prior to donning mask and gloves, hand hygiene was performed, as well as when it was doffed.   I was closer than 6 feet, but not for an extended period of time.     Back Pain   This is a chronic problem. The current episode started more than 1 year ago. The problem has been gradually worsening (worse since last office visit) since onset. The pain is present in the lumbar spine. The quality of the pain is described as aching and stabbing. The pain is at a severity of 7/10. The symptoms are aggravated by bending, sitting, standing, twisting and position. Associated symptoms include headaches, numbness (legs and feet bilaterally), pelvic pain (to see oncologist) and weakness (legs bilat.). Pertinent negatives include no abdominal pain, chest pain, dysuria or fever. Risk factors include lack of exercise, poor posture, sedentary lifestyle and history of cancer. She has tried analgesics, bed rest, heat and ice for the symptoms. The treatment provided no relief.   Hip Pain    There was no injury mechanism. The pain is present in the left hip and right hip. The pain is at a severity of 7/10. The pain has been worsening since onset. Associated symptoms include an inability to bear weight, a loss of motion and numbness (legs and feet bilaterally).      PEG Assessment   What number best describes your pain on average in the past week?7  What number best describes how, during the past week, pain has interfered with your enjoyment of life?7  What number best describes how, during the past week, pain has interfered with your general activity?  7    The following portions of the patient's history were reviewed and updated as appropriate: allergies, current medications, past family history, past medical history, past social history, past surgical history and problem list.    Review of Systems   Constitutional: Positive for activity change (decreased) and fatigue. Negative for chills and fever.   HENT: Negative for ear pain.    Eyes: Negative for pain.   Respiratory: Negative for chest tightness, shortness of breath and  "wheezing.    Cardiovascular: Positive for leg swelling. Negative for chest pain.   Gastrointestinal: Positive for constipation. Negative for abdominal pain, blood in stool, diarrhea, nausea and vomiting.   Genitourinary: Positive for pelvic pain (to see oncologist). Negative for difficulty urinating and dysuria.   Musculoskeletal: Positive for arthralgias, back pain, gait problem and neck pain. Negative for joint swelling.   Allergic/Immunologic: Negative for immunocompromised state.   Neurological: Positive for weakness (legs bilat.), numbness (legs and feet bilaterally) and headaches. Negative for dizziness and light-headedness.   Hematological: Does not bruise/bleed easily.   Psychiatric/Behavioral: Positive for agitation and sleep disturbance. Negative for behavioral problems and suicidal ideas. The patient is nervous/anxious.        Vitals:    06/23/20 1109   BP: 101/68   Pulse: 89   Resp: 20   Temp: 98.2 °F (36.8 °C)   SpO2: 95%   Weight: 75.8 kg (167 lb)  Comment: pt std wt refused wt in office   Height: 170.2 cm (67\")   PainSc:   7   PainLoc: Back       Objective   Physical Exam   Constitutional: She is oriented to person, place, and time. Vital signs are normal. She appears well-developed and well-nourished. She is cooperative.   HENT:   Head: Normocephalic and atraumatic.   Nose: Nose normal.   Eyes: Conjunctivae and lids are normal.   Cardiovascular: Normal rate.   Pulmonary/Chest: Effort normal.   Abdominal: Normal appearance.   Musculoskeletal:        Right hip: She exhibits decreased range of motion and tenderness.        Left hip: She exhibits decreased range of motion and tenderness.        Lumbar back: She exhibits decreased range of motion, tenderness and bony tenderness (exquisite tenderness of bilateral SI joints).        Back:    Unable to perform BASILIO due to pain   Neurological: She is alert and oriented to person, place, and time. Gait (motorized wheelchair) abnormal.   Skin: Skin is warm, " dry and intact.   ACE wraps of BLE's   Psychiatric: She has a normal mood and affect. Her speech is normal and behavior is normal. Judgment and thought content normal. Cognition and memory are normal.   Nursing note and vitals reviewed.      Intrathecal pump was interrogated in office today. Results are in chart.     The patient is currently at a dose of dilaudid 15 mg/ml + Naropin 0.5 mg/ml of 5 mg/day per day. There is the addition of PTM bolus. This is set at 0.2 mg/actuation given over 5 minutes. The lock-out is set at 4 hours with a maximum activation of 6 in 24 hours.     Under sterile technique, the pump was accessed using a [employer] proprietary refill kit. The volume was withdrawn from the pump. The expected residual volume of the pump was 4.5 ml. The actual residual volume of the pump was 5 ml.     The pump was then refilled with 40 ml of Dilaudid/Naropin at a concentration of 15/0.5 mg/ml. The patient was returned to previous regimen of dilaudid 4 mg/day with addition of PTM 0.2 mg over 3 minutes with lockout interval of 1 hour, maximum of 6 in 24 hours.      Patient tolerated procedure well. Minimal bleeding was noted. Pump site remains intact with no evidence of erythema or drainage.     Patient will return for pump refill when due or sooner if needed.      JUAN is June 2026    Assessment/Plan   Isha was seen today for back pain.    Diagnoses and all orders for this visit:    Chronic pain syndrome    Sacroiliac inflammation (CMS/HCC)  -     Cancel: Case Request    Presence of intrathecal pump    Hip pain    Other orders  -     gabapentin (NEURONTIN) 300 MG capsule; Take 1 capsule by mouth 3 (Three) Times a Day.      --- Increase pump to 6.43/mg/daily dose total.  --- Bilateral hip injections. No blood thinners. Reviewed the procedure at length with the patient.  Included in the review was expectations, complications, risk and benefits.The procedure was described in detail and the risks, benefits  and alternatives were discussed with the patient (including but not limited to: bleeding, infection, nerve damage, worsening of pain, inability to perform injection, paralysis, seizures, and death) who agreed to proceed.  Discussed the potential for sedation if warranted/wanted.  The procedure will plan to be performed at [facility] with fluoroscopic guidance(unless ultrasound is indicated) and could potentially have steroids and contrast dye used. Questions were answered and in a way the patient could understand.  Patient verbalized understanding and wishes to proceed.  This intervention will be ordered.  Discussed with patient that all procedures are part of a multimodal plan of care and include either formal PT or a home exercise program.  Patient has no evidence of coagulopathy or current infection.  --- Assume prescription for gabapentin 300 mg TID. Discussed medication with the patient.  Included in this discussion was the potential for side effects and adverse events.  Patient verbalized understanding and wished to proceed.  Prescription will be sent to pharmacy.  --- Continue with ortho evaluation as per Dr Rios.  --- Follow-up for pump refill or sooner if needed.         TERRELL REPORT    As part of the patient's treatment plan, I am prescribing controlled substances. The patient has been made aware of appropriate use of such medications, including potential risk of somnolence, limited ability to drive and/or work safely, and the potential for dependence or overdose. It has also bee made clear that these medications are for use by this patient only, without concomitant use of alcohol or other substances unless prescribed.     Patient has completed prescribing agreement detailing terms of continued prescribing of controlled substances, including monitoring TERRELL reports, urine drug screening, and pill counts if necessary. The patient is aware that inappropriate use will results in  cessation of prescribing such medications.    TERRELL report has been reviewed and scanned into the patient's chart.    As the clinician, I personally reviewed the TERRELL from 6-22-20 .    History and physical exam exhibit continued safe and appropriate use of controlled substances.      EMR Dragon/Transcription disclaimer:   Much of this encounter note is an electronic transcription/translation of spoken language to printed text. The electronic translation of spoken language may permit erroneous, or at times, nonsensical words or phrases to be inadvertently transcribed; Although I have reviewed the note for such errors, some may still exist.

## 2020-07-06 ENCOUNTER — TELEMEDICINE CONVERTED (OUTPATIENT)
Dept: INTERNAL MEDICINE | Facility: CLINIC | Age: 55
End: 2020-07-06
Attending: PHYSICIAN ASSISTANT

## 2020-07-15 RX ORDER — HYDROCODONE BITARTRATE AND ACETAMINOPHEN 7.5; 325 MG/1; MG/1
1 TABLET ORAL 2 TIMES DAILY PRN
Qty: 60 TABLET | Refills: 0 | Status: SHIPPED | OUTPATIENT
Start: 2020-07-15 | End: 2020-08-14 | Stop reason: SDUPTHER

## 2020-07-15 NOTE — TELEPHONE ENCOUNTER
Medication Refill Request    Date of phone call: 7/15/2020    Medication being requested: hydro/apap 7.5-325 mg sig: take 1 tab BID prn  Qty: 60    Date of last visit: 6/23/2020    Date of last refill: 6/15/2020    TERRELL up to date?: yes    Next Follow up?: 9/15/2020    Any new pertinent information? (i.e, new medication allergies, new use of medications, change in patient's health or condition, non-compliance or inconsistency with prescribing agreement?):

## 2020-07-27 ENCOUNTER — LAB REQUISITION (OUTPATIENT)
Dept: LAB | Facility: HOSPITAL | Age: 55
End: 2020-07-27

## 2020-07-27 DIAGNOSIS — Z00.00 ENCOUNTER FOR GENERAL ADULT MEDICAL EXAMINATION WITHOUT ABNORMAL FINDINGS: ICD-10-CM

## 2020-07-27 RX ORDER — GABAPENTIN 300 MG/1
300 CAPSULE ORAL 3 TIMES DAILY
Qty: 90 CAPSULE | Refills: 1 | Status: CANCELLED | OUTPATIENT
Start: 2020-07-27

## 2020-07-28 NOTE — TELEPHONE ENCOUNTER
I spoke with the pharmacy and they have the Rx ready for pick-up. I called and left a message for Ms. Llanes to return my call so I can notify her that she has a refill at the pharmacy.

## 2020-08-13 ENCOUNTER — TELEPHONE CONVERTED (OUTPATIENT)
Dept: INTERNAL MEDICINE | Facility: CLINIC | Age: 55
End: 2020-08-13
Attending: PHYSICIAN ASSISTANT

## 2020-08-14 RX ORDER — HYDROCODONE BITARTRATE AND ACETAMINOPHEN 7.5; 325 MG/1; MG/1
1 TABLET ORAL 2 TIMES DAILY PRN
Qty: 60 TABLET | Refills: 0 | Status: SHIPPED | OUTPATIENT
Start: 2020-08-14 | End: 2020-09-16 | Stop reason: SDUPTHER

## 2020-08-14 NOTE — TELEPHONE ENCOUNTER
Medication Refill Request    Date of phone call: 2020    Medication being requested: hydro/apap 7.5-325 mg si tab BID prn  Qty: 60    Date of last visit: 2020    Date of last refill: 7/15/2020    TERRELL up to date?: yes    Next Follow up?: 9/15/2020    Any new pertinent information? (i.e, new medication allergies, new use of medications, change in patient's health or condition, non-compliance or inconsistency with prescribing agreement?):

## 2020-08-26 RX ORDER — GABAPENTIN 300 MG/1
300 CAPSULE ORAL 3 TIMES DAILY
Qty: 90 CAPSULE | Refills: 1 | Status: SHIPPED | OUTPATIENT
Start: 2020-08-26 | End: 2020-11-03 | Stop reason: SDUPTHER

## 2020-08-26 NOTE — TELEPHONE ENCOUNTER
Medication Refill Request    Date of phone call: 20    Medication being requested: Gabapentin 300 mg   si cap po TID  Qty: 90    Date of last visit: 20    Date of last refill: 20    TERRELL up to date?: yes    Next Follow up?: 9/15/20    Any new pertinent information? (i.e, new medication allergies, new use of medications, change in patient's health or condition, non-compliance or inconsistency with prescribing agreement?):

## 2020-08-31 ENCOUNTER — LAB REQUISITION (OUTPATIENT)
Dept: LAB | Facility: HOSPITAL | Age: 55
End: 2020-08-31

## 2020-08-31 DIAGNOSIS — Z00.00 ENCOUNTER FOR GENERAL ADULT MEDICAL EXAMINATION WITHOUT ABNORMAL FINDINGS: ICD-10-CM

## 2020-08-31 PROCEDURE — U0004 COV-19 TEST NON-CDC HGH THRU: HCPCS | Performed by: ANESTHESIOLOGY

## 2020-09-01 LAB
REF LAB TEST METHOD: NORMAL
SARS-COV-2 RNA RESP QL NAA+PROBE: NOT DETECTED

## 2020-09-02 ENCOUNTER — OUTSIDE FACILITY SERVICE (OUTPATIENT)
Dept: PAIN MEDICINE | Facility: CLINIC | Age: 55
End: 2020-09-02

## 2020-09-02 ENCOUNTER — DOCUMENTATION (OUTPATIENT)
Dept: PAIN MEDICINE | Facility: CLINIC | Age: 55
End: 2020-09-02

## 2020-09-02 PROCEDURE — 20610 DRAIN/INJ JOINT/BURSA W/O US: CPT | Performed by: ANESTHESIOLOGY

## 2020-09-02 PROCEDURE — 77002 NEEDLE LOCALIZATION BY XRAY: CPT | Performed by: ANESTHESIOLOGY

## 2020-09-02 NOTE — PROGRESS NOTES
Bilateral Hip injections - lateral approaches  [facility]      PREOPERATIVE DIAGNOSIS:     Bilateral hip pain, bilateral hip arthritis    POSTOPERATIVE DIAGNOSIS:   Same as preop diagnosis    PROCEDURE:  [id number] - Bilateral Intra-articular Hip Joint Injections, with fluoroscopic guidance & hip arthrograms - Lateral Approach    PRE-PROCEDURE DISCUSSION WITH PATIENT:    Risks and complications were discussed with the patient prior to starting the procedure (including but not limited to infection, bleeding, injury, paralysis, and death) and informed consent was obtained.      SURGEON:  Horace Rios MD    REASON FOR PROCEDURE:    bilat hip oa, severe pains    SEDATION:   Versed 2mg & Fentanyl 100 mcg IV  ANESTHETIC AGENT:   Marcaine 0.5%  STEROID AGENT:    Methylprednisolone (DEPO MEDROL) 80mg/ml  Total volume of anesthestic / steroid solution = 6 ml    DESCRIPTON OF PROCEDURE:  After obtaining informed consent, IV access was obtained in the preoperative area.  The patient was transported to the operative suite and placed in a right lateral decubitus position.  The hip and knee were flexed.  EKG, blood pressure, and pulse oximetry were monitored.  Any and all sedation given was administered by the RN under my direct supervision and guidance.   The hip area was prepped in a wide diameter with Chloraprep and draped in a sterile fashion.     A point just cephalad of the Left greater trochanter was identified and the point was anesthetized with subcutaneous 1% Lidocaine.  A  22-gauge spinal needle was inserted perpendicular to the skin and, under fluoroscopic guidance and strict aseptic technique, directed over the greater trochanter toward the head of the femur and to a point near the medial neck of the femur and just lateral to the head of the femur.  Aspiration was confirmed negative.  After confirming the position of the needle with fluoroscopy, 1 mL of Omnipaque was injected and after seeing  appropriate spread into the joint a total of 3mL of injectate including the above listed local anesthetic and steroid was injected into the joint.  Vital signs remained stable.  The onset of analgesia was noted.  Needle removed intact.      Next, the patient was placed in a left lateral decubitus position.  The Right hip and knee were flexed.  EKG, blood pressure, and pulse oximetry were monitored.  Any and all sedation given was administered by the RN under my direct supervision and guidance.   The hip area was prepped in a wide diameter with Chloraprep and draped in a sterile fashion.   In like fashion, this contralateral hip joint was addressed.  Vital signs remained stable.  The onset of analgesia was noted.  Needle removed intact.        ESTIMATED BLOOD LOSS:  minimal  SPECIMENS:  None    COMPLICATIONS:  No complications were noted., There was no indication of vascular uptake on live injection of contrast dye. and The patient did not have any signs of postprocedure numbness nor weakness.    TOLERANCE & DISCHARGE CONDITION:    The patient tolerated the procedure well.  The patient was transported to the recovery area without difficulties.  The patient was discharged to home under the care of a chaperone and in satisfactory condition.    PLAN OF CARE:  1. The patient was given our standard instruction sheet.  2. The patient will follow up per routine in 2 wks.   3. The patient is asked to keep a pain log sheet hourly for 8 hours today.  4. The patient will resume all medications as per the medication reconciliation sheet.

## 2020-09-03 ENCOUNTER — TELEPHONE (OUTPATIENT)
Dept: PAIN MEDICINE | Facility: CLINIC | Age: 55
End: 2020-09-03

## 2020-09-03 DIAGNOSIS — Z46.2 END OF BATTERY LIFE OF INTRATHECAL INFUSION PUMP: Primary | ICD-10-CM

## 2020-09-03 NOTE — TELEPHONE ENCOUNTER
MsCarol Shraddha called and wanted to know if there was a plan on removing the first pump. She stated that she is feeling ok right now and there isn't much going on so she can have it done now if you thought it was a good idea. Please advise.

## 2020-09-03 NOTE — PROGRESS NOTES
She has previous pain pump with dead battery and would like it removed.    She will continue to use her functioning pain pump.

## 2020-09-09 ENCOUNTER — TELEPHONE (OUTPATIENT)
Dept: PAIN MEDICINE | Facility: CLINIC | Age: 55
End: 2020-09-09

## 2020-09-09 NOTE — TELEPHONE ENCOUNTER
Patient has been in admitted to [facility] with fever and exac of COPD. Her pump refill is scheduled for 9/15. We do have her medication in the office already. Her alarm date is 9/19. Spoke with the pharmacist at [facility] because she wanted to know what was in the pump. I informed her of the alarm date and what will happen if her pump runs out. She stated that she will inform her physicians and let them know to contact us.

## 2020-09-10 NOTE — TELEPHONE ENCOUNTER
Gabriel informed me that patient was released from hospital today and we do not need to contact [facility].

## 2020-09-11 RX ORDER — HYDROCODONE BITARTRATE AND ACETAMINOPHEN 7.5; 325 MG/1; MG/1
1 TABLET ORAL 2 TIMES DAILY PRN
Qty: 60 TABLET | Refills: 0 | Status: CANCELLED | OUTPATIENT
Start: 2020-09-11

## 2020-09-11 NOTE — TELEPHONE ENCOUNTER
Medication Refill Request    Date of phone call: 20    Medication being requested: Norco 7.5-325 mg    si tab po BID prn   Qty: 60     Date of last visit: 20    Date of last refill: 20    TERRELL up to date?: yes    Next Follow up?: 9/15/20    Any new pertinent information? (i.e, new medication allergies, new use of medications, change in patient's health or condition, non-compliance or inconsistency with prescribing agreement?):

## 2020-09-15 ENCOUNTER — OFFICE VISIT (OUTPATIENT)
Dept: PAIN MEDICINE | Facility: CLINIC | Age: 55
End: 2020-09-15

## 2020-09-15 VITALS
TEMPERATURE: 97.1 F | WEIGHT: 167 LBS | RESPIRATION RATE: 16 BRPM | BODY MASS INDEX: 26.21 KG/M2 | DIASTOLIC BLOOD PRESSURE: 68 MMHG | HEIGHT: 67 IN | OXYGEN SATURATION: 99 % | HEART RATE: 124 BPM | SYSTOLIC BLOOD PRESSURE: 106 MMHG

## 2020-09-15 DIAGNOSIS — M46.1 SACROILIAC INFLAMMATION (HCC): ICD-10-CM

## 2020-09-15 DIAGNOSIS — G89.4 CHRONIC PAIN SYNDROME: Primary | ICD-10-CM

## 2020-09-15 DIAGNOSIS — Z97.8 PRESENCE OF INTRATHECAL PUMP: ICD-10-CM

## 2020-09-15 PROCEDURE — 99214 OFFICE O/P EST MOD 30 MIN: CPT | Performed by: NURSE PRACTITIONER

## 2020-09-15 PROCEDURE — 62369 ANAL SP INF PMP W/REPRG&FILL: CPT | Performed by: NURSE PRACTITIONER

## 2020-09-15 NOTE — PROGRESS NOTES
CHIEF COMPLAINT  F/U back pain-Bilateral Hip injections - lateral approaches- patient states that her pain was improved 40%.        Subjective   Isha Llanes is a 54 y.o. female  who presents to the office for follow-up of procedure.  She completed a bilateral hip injection   on  9-2-20 performed by Dr. VASQUEZ for management of HIP PAIN. Patient reports 40% ONGOING relief from the procedure.     Was in hospital recently due to COPD exacerbation. Was given Percocet while in hospital.     AT last office visit, she was going to be seeing Dr Rosario. Had to cancel this appointment.     Complains of pain in her low back and hips. Today her pain is 8/10VAS. Describes the pain as continuous aching and throbbing. Continues with IT pump. Denies any side effects from this. Also continues with Gabapenti and Hydrocodone(per Dr Vasquez). Denies any side effects from the regimen. The regimen helps decrease her pain moderately. ADL's by self but difficulty with wheelchair.      Simple continuous infusion. 5.309 mg Dilaudid as basal rate. PTM is available for 0.2 mg over 2 minutes with 1 hour lockout duration. Max of 6 doses in 24 hours      Has been started on insulin.    Patient remained masked during entire encounter. No cough present. I donned a mask and faceshield throughout entire visit. Prior to donning mask and faceshield, hand hygiene was performed, as well as when it was doffed.  I was closer than 6 feet, but not for an extended period of time. No obvious exposure to any bodily fluids.    Back Pain  This is a chronic problem. The current episode started more than 1 year ago. The problem has been gradually worsening (worse since last office visit) since onset. The pain is present in the lumbar spine. The quality of the pain is described as aching and stabbing. The pain is at a severity of 7/10. The symptoms are aggravated by bending, sitting, standing, twisting and position. Associated symptoms include pelvic pain  (to see oncologist) and weakness. Pertinent negatives include no abdominal pain, chest pain, dysuria, fever, headaches or numbness. Risk factors include lack of exercise, poor posture, sedentary lifestyle and history of cancer. She has tried analgesics, bed rest, heat and ice for the symptoms. The treatment provided no relief.   Hip Pain   There was no injury mechanism. The pain is present in the left hip and right hip. The pain is at a severity of 7/10. The pain has been worsening since onset. Associated symptoms include an inability to bear weight and a loss of motion. Pertinent negatives include no numbness.        PEG Assessment   What number best describes your pain on average in the past week?8  What number best describes how, during the past week, pain has interfered with your enjoyment of life?10  What number best describes how, during the past week, pain has interfered with your general activity?  8      The following portions of the patient's history were reviewed and updated as appropriate: allergies, current medications, past family history, past medical history, past social history, past surgical history and problem list.    Review of Systems   Constitutional: Positive for fatigue. Negative for activity change, chills and fever.   Respiratory: Negative for chest tightness and shortness of breath.    Cardiovascular: Negative for chest pain.   Gastrointestinal: Positive for diarrhea. Negative for abdominal pain and constipation.   Genitourinary: Positive for pelvic pain (to see oncologist). Negative for difficulty urinating, dyspareunia and dysuria.   Musculoskeletal: Positive for back pain.   Neurological: Positive for weakness. Negative for dizziness, light-headedness, numbness and headaches.   Psychiatric/Behavioral: Positive for agitation and sleep disturbance. The patient is nervous/anxious.      I have reviewed and confirmed the accuracy of the ROS as documented by the MA/LPN/RN Blanca Pollard,  "APRN       Vitals:    09/15/20 1048   BP: 106/68   Pulse: (!) 124   Resp: 16   Temp: 97.1 °F (36.2 °C)   SpO2: 99%   Weight: 75.8 kg (167 lb)  Comment: patient stated   Height: 170.2 cm (67\")   PainSc:   8   PainLoc: Back     Repeat pulse check was 91  Repeat O2 check was 97% on RA    Objective   Physical Exam  Vitals signs and nursing note reviewed.   Constitutional:       Appearance: Normal appearance. She is well-developed.   HENT:      Head: Normocephalic and atraumatic.      Nose: Nose normal.   Eyes:      General: Lids are normal.      Conjunctiva/sclera: Conjunctivae normal.   Neck:      Musculoskeletal: Full passive range of motion without pain, normal range of motion and neck supple.      Trachea: Trachea normal.   Cardiovascular:      Rate and Rhythm: Normal rate and regular rhythm.      Heart sounds: Normal heart sounds.   Pulmonary:      Effort: Pulmonary effort is normal. No respiratory distress.   Musculoskeletal:      Right hip: She exhibits decreased range of motion.      Left hip: She exhibits decreased range of motion.      Lumbar back: She exhibits decreased range of motion and tenderness.        Back:    Skin:     General: Skin is warm and dry.   Neurological:      Mental Status: She is alert and oriented to person, place, and time.      Gait: Gait abnormal (motorized wheelchair).   Psychiatric:         Mood and Affect: Mood and affect normal.         Speech: Speech normal.         Behavior: Behavior normal. Behavior is cooperative.         Intrathecal pump was interrogated in office today. Results are in chart.     The patient is currently at a dose of dilaudid 15 mg/ml + Naropin 0.5 mg/ml of 5 mg/day per day. There is the addition of PTM bolus. This is set at 0.2 mg/actuation given over 5 minutes. The lock-out is set at 4 hours with a maximum activation of 6 in 24 hours.     Under sterile technique, the pump was accessed using a [employer] proprietary refill kit. The volume was withdrawn from " the pump. The expected residual volume of the pump was 5.6 ml. The actual residual volume of the pump was 7.0 ml.     The pump was then refilled with 40 ml of Dilaudid/Naropin at a concentration of 15/0.5 mg/ml. The patient was returned to previous regimen of dilaudid 5.4 mg/day with addition of PTM 0.2 mg over 2 minutes with lockout interval of 1 hour, maximum of 6 in 24 hours.      Patient tolerated procedure well. Minimal bleeding was noted. Pump site remains intact with no evidence of erythema or drainage.     Patient will return for pump refill when due or sooner if needed.      JUAN is July 2026    Assessment/Plan   Isha was seen today for back pain.    Diagnoses and all orders for this visit:    Chronic pain syndrome    Sacroiliac inflammation (CMS/HCC)    Presence of intrathecal pump      --- Refill pump. No changes at this time.   --- Continue with medication regimen as per Dr Rios  --- Recommend following up with ortho/Dr Rosario  --- Repeat interventions in future PRN-- patient wants to wait at this time.   --- Follow-up for pump refill or sooner if needed.         TERRELL REPORT  As part of the patient's treatment plan, I am prescribing controlled substances. The patient has been made aware of appropriate use of such medications, including potential risk of somnolence, limited ability to drive and/or work safely, and the potential for dependence or overdose. It has also bee made clear that these medications are for use by this patient only, without concomitant use of alcohol or other substances unless prescribed.     Patient has completed prescribing agreement detailing terms of continued prescribing of controlled substances, including monitoring TERRELL reports, urine drug screening, and pill counts if necessary. The patient is aware that inappropriate use will results in cessation of prescribing such medications.    TERRELL report has been reviewed and scanned into the patient's chart.    As the  clinician, I personally reviewed the TERRELL from 9-14-20 while the patient was in the office today.    History and physical exam exhibit continued safe and appropriate use of controlled substances.        EMR Dragon/Transcription disclaimer:   Much of this encounter note is an electronic transcription/translation of spoken language to printed text. The electronic translation of spoken language may permit erroneous, or at times, nonsensical words or phrases to be inadvertently transcribed; Although I have reviewed the note for such errors, some may still exist.

## 2020-09-16 ENCOUNTER — OFFICE VISIT CONVERTED (OUTPATIENT)
Dept: INTERNAL MEDICINE | Facility: CLINIC | Age: 55
End: 2020-09-16
Attending: PHYSICIAN ASSISTANT

## 2020-09-16 RX ORDER — HYDROCODONE BITARTRATE AND ACETAMINOPHEN 7.5; 325 MG/1; MG/1
1 TABLET ORAL 2 TIMES DAILY PRN
Qty: 60 TABLET | Refills: 0 | Status: SHIPPED | OUTPATIENT
Start: 2020-09-16 | End: 2020-10-14 | Stop reason: SDUPTHER

## 2020-09-16 NOTE — TELEPHONE ENCOUNTER
Medication Refill Request    Date of phone call: 20    Medication being requested: norco 7.5/325mg si tab po bid prn   Qty: 60    Date of last visit: 20    Date of last refill: 20    TERRELL up to date?: yes    Next Follow up?: 20    Any new pertinent information? (i.e, new medication allergies, new use of medications, change in patient's health or condition, non-compliance or inconsistency with prescribing agreement?):

## 2020-10-07 ENCOUNTER — TELEPHONE (OUTPATIENT)
Dept: PAIN MEDICINE | Facility: CLINIC | Age: 55
End: 2020-10-07

## 2020-10-09 ENCOUNTER — HOSPITAL ENCOUNTER (OUTPATIENT)
Dept: CARDIOLOGY | Facility: HOSPITAL | Age: 55
Discharge: HOME | End: 2020-10-09
Attending: INTERNAL MEDICINE

## 2020-10-14 RX ORDER — HYDROCODONE BITARTRATE AND ACETAMINOPHEN 7.5; 325 MG/1; MG/1
1 TABLET ORAL 2 TIMES DAILY PRN
Qty: 30 TABLET | Refills: 0 | Status: SHIPPED | OUTPATIENT
Start: 2020-10-14 | End: 2020-10-29 | Stop reason: SDUPTHER

## 2020-10-14 NOTE — TELEPHONE ENCOUNTER
Medication Refill Request    Date of phone call: 10/14/20    Medication being requested: norco 7.5/325mg si tab po bid prn   Qty: 60    Date of last visit: 9/15/20    Date of last refill:     TERRELL up to date?:     Next Follow up?: 10/28/20    Any new pertinent information? (i.e, new medication allergies, new use of medications, change in patient's health or condition, non-compliance or inconsistency with prescribing agreement?):

## 2020-10-28 ENCOUNTER — OFFICE VISIT (OUTPATIENT)
Dept: PAIN MEDICINE | Facility: CLINIC | Age: 55
End: 2020-10-28

## 2020-10-28 VITALS
HEART RATE: 95 BPM | SYSTOLIC BLOOD PRESSURE: 118 MMHG | RESPIRATION RATE: 20 BRPM | DIASTOLIC BLOOD PRESSURE: 81 MMHG | TEMPERATURE: 97.1 F | OXYGEN SATURATION: 99 % | BODY MASS INDEX: 26.16 KG/M2 | HEIGHT: 67 IN

## 2020-10-28 DIAGNOSIS — G89.4 CHRONIC PAIN SYNDROME: Primary | ICD-10-CM

## 2020-10-28 DIAGNOSIS — M25.559 HIP PAIN: ICD-10-CM

## 2020-10-28 DIAGNOSIS — Z97.8 PRESENCE OF INTRATHECAL PUMP: ICD-10-CM

## 2020-10-28 DIAGNOSIS — M54.41 CHRONIC LOW BACK PAIN WITH BILATERAL SCIATICA, UNSPECIFIED BACK PAIN LATERALITY: ICD-10-CM

## 2020-10-28 DIAGNOSIS — G89.3 CANCER ASSOCIATED PAIN: ICD-10-CM

## 2020-10-28 DIAGNOSIS — M54.42 CHRONIC LOW BACK PAIN WITH BILATERAL SCIATICA, UNSPECIFIED BACK PAIN LATERALITY: ICD-10-CM

## 2020-10-28 DIAGNOSIS — G89.29 CHRONIC LOW BACK PAIN WITH BILATERAL SCIATICA, UNSPECIFIED BACK PAIN LATERALITY: ICD-10-CM

## 2020-10-28 PROCEDURE — 99212 OFFICE O/P EST SF 10 MIN: CPT | Performed by: ANESTHESIOLOGY

## 2020-10-28 PROCEDURE — 62368 ANALYZE SP INF PUMP W/REPROG: CPT | Performed by: ANESTHESIOLOGY

## 2020-10-28 PROCEDURE — 80305 DRUG TEST PRSMV DIR OPT OBS: CPT | Performed by: ANESTHESIOLOGY

## 2020-10-28 RX ORDER — PANCRELIPASE 60000; 12000; 38000 [USP'U]/1; [USP'U]/1; [USP'U]/1
CAPSULE, DELAYED RELEASE PELLETS ORAL
COMMUNITY
Start: 2020-08-31 | End: 2020-11-20

## 2020-10-28 RX ORDER — RIFAMPIN 300 MG/1
300 CAPSULE ORAL
COMMUNITY
Start: 2020-09-10 | End: 2020-11-20

## 2020-10-28 RX ORDER — INSULIN DEGLUDEC INJECTION 100 U/ML
INJECTION, SOLUTION SUBCUTANEOUS
COMMUNITY
Start: 2020-08-26 | End: 2020-11-20

## 2020-10-28 RX ORDER — INSULIN LISPRO 100 U/ML
12 INJECTION, SOLUTION SUBCUTANEOUS DAILY
COMMUNITY
Start: 2020-08-26 | End: 2022-03-25 | Stop reason: SDUPTHER

## 2020-10-28 RX ORDER — FLURBIPROFEN SODIUM 0.3 MG/ML
SOLUTION/ DROPS OPHTHALMIC 4 TIMES DAILY
COMMUNITY
Start: 2020-09-19 | End: 2020-11-20

## 2020-10-28 NOTE — PROGRESS NOTES
CHIEF COMPLAINT  F/u back pain. Pt sts pain has worsened. Pt sts she would like to have her pain pump increased today.     Subjective   Isha Llanes is a 54 y.o. female  who presents for follow-up.  She has a history of back pain, hip pain.    Mild relief from hydrocodone use for hip pain.      She does not feel relief when she uses a PTM dose    Patient remained masked during entire encounter. No cough present. I donned a mask and eye protection throughout entire visit. Prior to donning mask and eye protection, hand hygiene was performed, as well as when it was doffed.  I was closer than 6 feet, but not for an extended period of time. No obvious exposure to any bodily fluids.    Back Pain  This is a chronic problem. The current episode started more than 1 year ago. The problem has been gradually worsening (worse since last office visit) since onset. The pain is present in the lumbar spine. The quality of the pain is described as aching and stabbing. The pain is severe. The symptoms are aggravated by bending, sitting, standing, twisting and position. Associated symptoms include pelvic pain (to see oncologist). Pertinent negatives include no abdominal pain, chest pain, dysuria, fever, headaches, numbness or weakness. Risk factors include lack of exercise, poor posture, sedentary lifestyle and history of cancer. She has tried analgesics, bed rest, heat and ice for the symptoms. The treatment provided no relief.   Hip Pain   There was no injury mechanism. The pain is present in the left hip and right hip. The pain is severe. The pain has been worsening since onset. Associated symptoms include an inability to bear weight and a loss of motion. Pertinent negatives include no numbness. The treatment provided mild relief.        PEG Assessment   What number best describes your pain on average in the past week?7  What number best describes how, during the past week, pain has interfered with your enjoyment of  life?7  What number best describes how, during the past week, pain has interfered with your general activity?  7    --  The aforementioned information the Chief Complaint section and above subjective data including any HPI data, and also the Review of Systems data, has been personally reviewed and affirmed.  --        The following portions of the patient's history were reviewed and updated as appropriate: allergies, current medications, past family history, past medical history, past social history, past surgical history and problem list.    -------    The following portions of the patient's history were reviewed and updated as appropriate: allergies, current medications, past family history, past medical history, past social history, past surgical history and problem list.    Allergies   Allergen Reactions   • Amoxicillin Shortness Of Breath   • Ceclor [Cefaclor] Shortness Of Breath   • Penicillins Shortness Of Breath   • Sulfa Antibiotics Rash   • Doxycycline GI Intolerance   • Vancomycin Itching   • Compazine [Prochlorperazine Edisylate] Rash   • Contrast Dye Other (See Comments)     Caused pain in her arm   • Phenergan [Promethazine Hcl] Rash         Current Outpatient Medications:   •  ACCU-CHEK CURT PLUS test strip, SUGAR THREE TIMES DAILY, Disp: , Rfl: 11  •  ACCU-CHEK SOFTCLIX LANCETS lancets, USE TO TEST BLOOD SUGARS THREE TIMES DAILY, Disp: , Rfl: 11  •  acetaminophen (TYLENOL) 500 MG tablet, Take 1,000-1,500 mg by mouth Every 6 (Six) Hours As Needed for Mild Pain ., Disp: , Rfl:   •  albuterol (PROVENTIL HFA;VENTOLIN HFA) 108 (90 Base) MCG/ACT inhaler, Inhale 2 puffs Every 4 (Four) Hours As Needed for Wheezing., Disp: , Rfl:   •  Alogliptin Benzoate 12.5 MG tablet, , Disp: , Rfl:   •  ANTI-DIARRHEAL 2 MG tablet, TAKE 1 TABLET BY MOUTH EVERY 8 HOURS AS NEEDED FOR 10 DAYS, Disp: , Rfl: 0  •  B-D UF III MINI PEN NEEDLES 31G X 5 MM misc, 4 (Four) Times a Day. as directed, Disp: , Rfl:   •  bacitracin 500  UNIT/GM ointment, Apply  topically to the appropriate area as directed 2 (Two) Times a Day. to affected area, Disp: , Rfl:   •  Blood Glucose Monitoring Suppl (ACCU-CHEK GUIDE) w/Device kit, See Admin Instructions., Disp: , Rfl: 0  •  BREO ELLIPTA 200-25 MCG/INH inhaler, inhale ONE puff BY MOUTH EVERY DAY, Disp: , Rfl:   •  carvedilol (COREG) 3.125 MG tablet, Take 3.125 mg by mouth 2 (Two) Times a Day., Disp: , Rfl: 2  •  clindamycin (CLEOCIN) 300 MG capsule, TAKE ONE CAPSULE BY MOUTH THREE TIMES DAILY for TEN DAYS, Disp: , Rfl: 0  •  Creon 91765 units capsule delayed-release particles capsule, TAKE ONE CAPSULE BY MOUTH THREE TIMES DAILY BEFORE MEALS, Disp: , Rfl:   •  DULoxetine (CYMBALTA) 20 MG capsule, Take 20 mg by mouth Daily., Disp: , Rfl:   •  furosemide (LASIX) 40 MG tablet, Take 40 mg by mouth Daily., Disp: , Rfl:   •  gabapentin (NEURONTIN) 300 MG capsule, Take 1 capsule by mouth 3 (Three) Times a Day., Disp: 90 capsule, Rfl: 1  •  glimepiride (AMARYL) 2 MG tablet, Take 2 mg by mouth Daily., Disp: , Rfl: 1  •  HYDROcodone-acetaminophen (NORCO) 7.5-325 MG per tablet, Take 1 tablet by mouth 2 (Two) Times a Day As Needed for Severe Pain . DNF 10/30/2020, Disp: 60 tablet, Rfl: 0  •  HYDROmorphone (DILAUDID) 1 mg/mL solution, Infuse  into a venous catheter Dose Adjusted By Provider As Needed., Disp: , Rfl:   •  Insulin Lispro (ADMELOG SOLOSTAR) 100 UNIT/ML injection pen, inject TWO TO 12 UNITS THREE TIMES DAILY BEFORE MEALS, Disp: , Rfl:   •  methylPREDNISolone (MEDROL, SHANIA,) 4 MG tablet, Take as directed on package instructions., Disp: 21 tablet, Rfl: 0  •  mupirocin (BACTROBAN) 2 % cream, Apply  topically to the appropriate area as directed 2 (Two) Times a Day., Disp: 15 g, Rfl: 0  •  mupirocin (BACTROBAN) 2 % ointment, USE ONE application intranasally TWICE DAILY, Disp: , Rfl: 1  •  naloxone (NARCAN) 4 MG/0.1ML nasal spray, 1 spray into the nostril(s) as directed by provider As Needed (for overdose)., Disp:  1 each, Rfl: 0  •  NYSTATIN 208226 UNIT/GM powder, , Disp: , Rfl:   •  ondansetron (ZOFRAN) 4 MG tablet, Take 4 mg by mouth Every 8 (Eight) Hours As Needed., Disp: , Rfl: 0  •  ondansetron ODT (ZOFRAN ODT) 4 MG disintegrating tablet, Take 1-2 tablets by mouth Every 8 (Eight) Hours As Needed for Nausea or Vomiting., Disp: 60 tablet, Rfl: 0  •  rifAMPin (RIFADIN) 300 MG capsule, Take 300 mg by mouth 2 (Two) Times a Day Before Meals., Disp: , Rfl:   •  SSD 1 % cream, Apply 1 application topically to the appropriate area as directed 2 (Two) Times a Day. APPLY TO AFFECTED AREA, Disp: , Rfl:   •  Tresiba FlexTouch 100 UNIT/ML solution pen-injector injection, inject SIX TO 40 UNITS EVERY DAY, Disp: , Rfl:   •  triamcinolone (KENALOG) 0.1 % ointment, , Disp: , Rfl:   •  trimethoprim-polymyxin b (POLYTRIM) 82412-5.1 UNIT/ML-% ophthalmic solution, instill one drop IN THE affected EYE EVERY 6 HOURS for SEVEN DAYS, Disp: , Rfl:   •  vitamin D (ERGOCALCIFEROL) 1.25 MG (54856 UT) capsule capsule, Take 50,000 Units by mouth 1 (One) Time Per Week., Disp: , Rfl: 5    Current Outpatient Medications on File Prior to Visit   Medication Sig Dispense Refill   • ACCU-CHEK CURT PLUS test strip SUGAR THREE TIMES DAILY  11   • ACCU-CHEK SOFTCLIX LANCETS lancets USE TO TEST BLOOD SUGARS THREE TIMES DAILY  11   • acetaminophen (TYLENOL) 500 MG tablet Take 1,000-1,500 mg by mouth Every 6 (Six) Hours As Needed for Mild Pain .     • albuterol (PROVENTIL HFA;VENTOLIN HFA) 108 (90 Base) MCG/ACT inhaler Inhale 2 puffs Every 4 (Four) Hours As Needed for Wheezing.     • Alogliptin Benzoate 12.5 MG tablet      • ANTI-DIARRHEAL 2 MG tablet TAKE 1 TABLET BY MOUTH EVERY 8 HOURS AS NEEDED FOR 10 DAYS  0   • B-D UF III MINI PEN NEEDLES 31G X 5 MM misc 4 (Four) Times a Day. as directed     • bacitracin 500 UNIT/GM ointment Apply  topically to the appropriate area as directed 2 (Two) Times a Day. to affected area     • Blood Glucose Monitoring Suppl  (ACCU-CHEK GUIDE) w/Device kit See Admin Instructions.  0   • BREO ELLIPTA 200-25 MCG/INH inhaler inhale ONE puff BY MOUTH EVERY DAY     • carvedilol (COREG) 3.125 MG tablet Take 3.125 mg by mouth 2 (Two) Times a Day.  2   • clindamycin (CLEOCIN) 300 MG capsule TAKE ONE CAPSULE BY MOUTH THREE TIMES DAILY for TEN DAYS  0   • Creon 32417 units capsule delayed-release particles capsule TAKE ONE CAPSULE BY MOUTH THREE TIMES DAILY BEFORE MEALS     • DULoxetine (CYMBALTA) 20 MG capsule Take 20 mg by mouth Daily.     • furosemide (LASIX) 40 MG tablet Take 40 mg by mouth Daily.     • gabapentin (NEURONTIN) 300 MG capsule Take 1 capsule by mouth 3 (Three) Times a Day. 90 capsule 1   • glimepiride (AMARYL) 2 MG tablet Take 2 mg by mouth Daily.  1   • HYDROmorphone (DILAUDID) 1 mg/mL solution Infuse  into a venous catheter Dose Adjusted By Provider As Needed.     • Insulin Lispro (ADMELOG SOLOSTAR) 100 UNIT/ML injection pen inject TWO TO 12 UNITS THREE TIMES DAILY BEFORE MEALS     • methylPREDNISolone (MEDROL, SHANIA,) 4 MG tablet Take as directed on package instructions. 21 tablet 0   • mupirocin (BACTROBAN) 2 % cream Apply  topically to the appropriate area as directed 2 (Two) Times a Day. 15 g 0   • mupirocin (BACTROBAN) 2 % ointment USE ONE application intranasally TWICE DAILY  1   • naloxone (NARCAN) 4 MG/0.1ML nasal spray 1 spray into the nostril(s) as directed by provider As Needed (for overdose). 1 each 0   • NYSTATIN 704413 UNIT/GM powder      • ondansetron (ZOFRAN) 4 MG tablet Take 4 mg by mouth Every 8 (Eight) Hours As Needed.  0   • ondansetron ODT (ZOFRAN ODT) 4 MG disintegrating tablet Take 1-2 tablets by mouth Every 8 (Eight) Hours As Needed for Nausea or Vomiting. 60 tablet 0   • rifAMPin (RIFADIN) 300 MG capsule Take 300 mg by mouth 2 (Two) Times a Day Before Meals.     • SSD 1 % cream Apply 1 application topically to the appropriate area as directed 2 (Two) Times a Day. APPLY TO AFFECTED AREA     • Tresiba  FlexTouch 100 UNIT/ML solution pen-injector injection inject SIX TO 40 UNITS EVERY DAY     • triamcinolone (KENALOG) 0.1 % ointment      • trimethoprim-polymyxin b (POLYTRIM) 15713-7.1 UNIT/ML-% ophthalmic solution instill one drop IN THE affected EYE EVERY 6 HOURS for SEVEN DAYS     • vitamin D (ERGOCALCIFEROL) 1.25 MG (46434 UT) capsule capsule Take 50,000 Units by mouth 1 (One) Time Per Week.  5   • [DISCONTINUED] HYDROcodone-acetaminophen (NORCO) 7.5-325 MG per tablet Take 1 tablet by mouth 2 (Two) Times a Day As Needed for Severe Pain . DNF 10/17/2020 30 tablet 0     No current facility-administered medications on file prior to visit.        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)   • Other pulmonary embolism without acute cor pulmonale (CMS/HCC)   • Nonrheumatic aortic valve stenosis   • Hip pain       Past Medical History:   Diagnosis Date   • Anxiety    • Anxiety    • Aortic stenosis, severe    • Arthritis    • Arthritis    • Asthma    • Back pain    • Blood clotting disorder (CMS/HCC)    • Cancer associated pain    • Chronic low back pain with sciatica    • Diabetes mellitus (CMS/HCC)     TYPE 2   • Dyspnea on effort    • GERD (gastroesophageal reflux disease)    • Hiatal hernia    • History of kidney stones    • History of transfusion    • Hodgkin's lymphoma (CMS/HCC)    • Immobility    • Low back pain    • Lupus (CMS/HCC)    • Mid back pain    • Other pulmonary embolism without acute cor pulmonale (CMS/HCC)    • Pelvic pain    • Peripheral neuropathy    • Presence of intrathecal pump    • Sacroiliac joint disease    • SI  (sacroiliac) joint inflammation (CMS/HCC)        Past Surgical History:   Procedure Laterality Date   • BACK SURGERY     • BLADDER REPAIR     • CARDIAC CATHETERIZATION N/A 3/6/2019    Procedure: Valvuloplasty;  Surgeon: Wayne Johnson MD;  Location: [facility] INVASIVE LOCATION;  Service: Cardiology   • CHOLECYSTECTOMY     • HYSTERECTOMY     • LYMPHADENECTOMY     • PAIN PUMP INSERTION/REVISION     • PAIN PUMP INSERTION/REVISION N/A 10/18/2019    Procedure: PAIN PUMP INSERTION [facility] 10-18-19 [place];  Surgeon: Horace Rios MD;  Location: [facility] MAIN OR;  Service: Pain Management   • TONSILLECTOMY     • TUBAL ABDOMINAL LIGATION     • TUMOR REMOVAL         Family History   Problem Relation Age of Onset   • Pancreatic cancer Sister    • Pancreatic cancer Paternal Grandmother    • Malig Hyperthermia Neg Hx        Social History     Socioeconomic History   • Marital status: Single     Spouse name: Not on file   • Number of children: Not on file   • Years of education: Not on file   • Highest education level: Not on file   Tobacco Use   • Smoking status: Current Every Day Smoker     Packs/day: 2.00     Years: 41.00     Pack years: 82.00     Types: Cigarettes   • Smokeless tobacco: Never Used   • Tobacco comment: patient refused   Substance and Sexual Activity   • Alcohol use: No   • Drug use: No   • Sexual activity: Defer     Birth control/protection: Surgical     Comment: KRYSTAL       -------        Review of Systems   Constitutional: Negative for activity change, fatigue and fever.   HENT: Negative for congestion.    Eyes: Negative for visual disturbance.   Respiratory: Negative for apnea, cough and shortness of breath.    Cardiovascular: Negative for chest pain.   Gastrointestinal: Negative for abdominal pain, constipation and diarrhea.   Genitourinary: Positive for pelvic pain (to see oncologist). Negative for difficulty urinating and dysuria.   Musculoskeletal: Positive for back pain and gait problem  "(w/c).   Allergic/Immunologic: Negative for immunocompromised state.   Neurological: Negative for dizziness, weakness, light-headedness, numbness and headaches.   Psychiatric/Behavioral: Negative for agitation, sleep disturbance and suicidal ideas. The patient is not nervous/anxious.      Verónica report is reviewed:  I reviewed the document in the electronic form under the PDMP tab in the Epic EMR...  - In this function, the report is a current report in as close to real-time as possible.  - The report was available for immediate review.    - I did ana cristina the report as reviewed.  - There is not concern for aberrant behavior based on this ekasper review.    Vitals:    10/28/20 1427   BP: 118/81   Pulse: 95   Resp: 20   Temp: 97.1 °F (36.2 °C)   SpO2: 99%   Weight: Comment: pt sts doesn't know current wt   Height: 170.2 cm (67\")   PainSc:   7   PainLoc: Back         Objective   Physical Exam  VSS, NNR, NCAT, NMNA, NRD, AAOx3.  Significant postural deformity is unchanged  Note her previous pump in the abdomen      Assessment/Plan   Diagnoses and all orders for this visit:    1. Chronic pain syndrome (Primary)  -     Urine Drug Screen Confirmation - Urine, Clean Catch; Future  -     POC Urine Drug Screen, Triage  -     HYDROcodone-acetaminophen (NORCO) 7.5-325 MG per tablet; Take 1 tablet by mouth 2 (Two) Times a Day As Needed for Severe Pain . DNF 10/30/2020  Dispense: 60 tablet; Refill: 0    2. Presence of intrathecal pump    3. Cancer associated pain    4. Chronic low back pain with bilateral sciatica, unspecified back pain laterality    5. Hip pain        --- Follow-up for refill in a week or so    -- continuing hydrocodone prn    -- plan to remove her old nonfunctioning pump soon if possible         Pump interrogation & reprogramming note...    Intrathecal pump was interrogated in office today. Results are in chart.    The patient is currently at a dose of 5.3mg of hydromorphone per day. There is the addition of PTM " bolus. This is set at 0.2 mg/actuation given over 2 minutes. The lock-out is set at 1 hours with a maximum activation of 6 in 24 hours.    Pump basal rate changed to 6mg per day.  PTM changed to 0.5mg over 3 minutes, lockout 2 hours, max activation 6 in 24 hours.      Patient will return for pump refill when due or sooner if needed.     -------------      TERRELL REPORT  As part of the patient's treatment plan, I am prescribing controlled substances. The patient has been made aware of appropriate use of such medications, including potential risk of somnolence, limited ability to drive and/or work safely, and the potential for dependence or overdose. It has also bee made clear that these medications are for use by this patient only, without concomitant use of alcohol or other substances unless prescribed.     Patient has completed prescribing agreement detailing terms of continued prescribing of controlled substances, including monitoring TERRELL reports, urine drug screening, and pill counts if necessary. The patient is aware that inappropriate use will results in cessation of prescribing such medications.    TERRELL report has been reviewed and scanned into the patient's chart.    As the clinician, I personally reviewed the TERRELL from as above while the patient was in the office today.    History and physical exam exhibit continued safe and appropriate use of controlled substances.    Patient appears stable with current regimen. No adverse effects. Regarding continuation of opioids, there is no evidence of aberrant behavior or any red flags.  The patient continues with appropriate response to opioid therapy. ADL's remain intact by self.     she does have a significant history of cancer related and chemo related pain, post-chemo pain, spinal deformity, significant hip deformity, and demonstrates moderate improvement with multimodal therapy including opioid therapy, which allows her to engage in ADLs and family  activities. The continuation of opioid therapy is therefore not contraindicated. We will however respect the March 2016 CDC Guidelines and will plan to avoid overexposure to opioids and avoid dose escalation.        EMR Dragon/Transcription disclaimer:   Much of this encounter note is an electronic transcription/translation of spoken language to printed text. The electronic translation of spoken language may permit erroneous, or at times, nonsensical words or phrases to be inadvertently transcribed; Although I have reviewed the note for such errors, some may still exist.

## 2020-10-29 ENCOUNTER — RESULTS ENCOUNTER (OUTPATIENT)
Dept: PAIN MEDICINE | Facility: CLINIC | Age: 55
End: 2020-10-29

## 2020-10-29 DIAGNOSIS — G89.4 CHRONIC PAIN SYNDROME: ICD-10-CM

## 2020-10-29 LAB
POC AMPHETAMINES: NEGATIVE
POC BARBITURATES: NEGATIVE
POC BENZODIAZEPHINES: NEGATIVE
POC COCAINE: NEGATIVE
POC METHADONE: NEGATIVE
POC METHAMPHETAMINE SCREEN URINE: NEGATIVE
POC OPIATES: POSITIVE
POC OXYCODONE: NEGATIVE
POC PHENCYCLIDINE: NEGATIVE
POC PROPOXYPHENE: NEGATIVE
POC THC: NEGATIVE
POC TRICYCLIC ANTIDEPRESSANTS: NEGATIVE

## 2020-10-29 RX ORDER — HYDROCODONE BITARTRATE AND ACETAMINOPHEN 7.5; 325 MG/1; MG/1
1 TABLET ORAL 2 TIMES DAILY PRN
Qty: 60 TABLET | Refills: 0 | Status: SHIPPED | OUTPATIENT
Start: 2020-10-29 | End: 2020-12-15

## 2020-11-03 RX ORDER — GABAPENTIN 300 MG/1
300 CAPSULE ORAL 3 TIMES DAILY
Qty: 90 CAPSULE | Refills: 1 | Status: SHIPPED | OUTPATIENT
Start: 2020-11-03 | End: 2020-12-29 | Stop reason: SDUPTHER

## 2020-11-03 NOTE — TELEPHONE ENCOUNTER
Medication Refill Request    Date of phone call: 11/3/20    Medication being requested: gabapentin 300mg si caps po tid  Qty: 90    Date of last visit: 10/28/20    Date of last refill:     TERRELL up to date?:     Next Follow up?: 20    Any new pertinent information? (i.e, new medication allergies, new use of medications, change in patient's health or condition, non-compliance or inconsistency with prescribing agreement?):

## 2020-11-04 PROBLEM — Z46.2 END OF BATTERY LIFE OF INTRATHECAL INFUSION PUMP: Status: ACTIVE | Noted: 2020-11-04

## 2020-11-20 ENCOUNTER — HOSPITAL ENCOUNTER (OUTPATIENT)
Dept: GENERAL RADIOLOGY | Facility: HOSPITAL | Age: 55
Discharge: HOME | End: 2020-11-20

## 2020-11-20 ENCOUNTER — APPOINTMENT (OUTPATIENT)
Dept: PREADMISSION TESTING | Facility: HOSPITAL | Age: 55
End: 2020-11-20

## 2020-11-20 VITALS
RESPIRATION RATE: 17 BRPM | HEIGHT: 65 IN | WEIGHT: 167 LBS | HEART RATE: 74 BPM | SYSTOLIC BLOOD PRESSURE: 112 MMHG | BODY MASS INDEX: 27.82 KG/M2 | DIASTOLIC BLOOD PRESSURE: 74 MMHG | OXYGEN SATURATION: 96 % | TEMPERATURE: 98.4 F

## 2020-11-20 LAB
ALBUMIN SERPL-MCNC: 4.2 G/DL (ref 3.5–5.2)
ALBUMIN/GLOB SERPL: 1.6 G/DL
ALP SERPL-CCNC: 84 U/L (ref 39–117)
ALT SERPL W P-5'-P-CCNC: 10 U/L (ref 1–33)
ANION GAP SERPL CALCULATED.3IONS-SCNC: 11.4 MMOL/L (ref 5–15)
ANION GAP SERPL CALCULATED.3IONS-SCNC: 8.9 MMOL/L (ref 5–15)
AST SERPL-CCNC: 14 U/L (ref 1–32)
BILIRUB SERPL-MCNC: 0.2 MG/DL (ref 0–1.2)
BUN SERPL-MCNC: 12 MG/DL (ref 6–20)
BUN SERPL-MCNC: 12 MG/DL (ref 6–20)
BUN/CREAT SERPL: 19.7 (ref 7–25)
BUN/CREAT SERPL: 21.4 (ref 7–25)
CALCIUM SPEC-SCNC: 9.3 MG/DL (ref 8.6–10.5)
CALCIUM SPEC-SCNC: 9.3 MG/DL (ref 8.6–10.5)
CHLORIDE SERPL-SCNC: 102 MMOL/L (ref 98–107)
CHLORIDE SERPL-SCNC: 102 MMOL/L (ref 98–107)
CO2 SERPL-SCNC: 25.6 MMOL/L (ref 22–29)
CO2 SERPL-SCNC: 26.1 MMOL/L (ref 22–29)
CREAT SERPL-MCNC: 0.56 MG/DL (ref 0.57–1)
CREAT SERPL-MCNC: 0.61 MG/DL (ref 0.57–1)
GFR SERPL CREATININE-BSD FRML MDRD: 102 ML/MIN/1.73
GFR SERPL CREATININE-BSD FRML MDRD: 113 ML/MIN/1.73
GLOBULIN UR ELPH-MCNC: 2.7 GM/DL
GLUCOSE SERPL-MCNC: 146 MG/DL (ref 65–99)
GLUCOSE SERPL-MCNC: 148 MG/DL (ref 65–99)
POTASSIUM SERPL-SCNC: 3.8 MMOL/L (ref 3.5–5.2)
POTASSIUM SERPL-SCNC: 4 MMOL/L (ref 3.5–5.2)
PROT SERPL-MCNC: 6.9 G/DL (ref 6–8.5)
QT INTERVAL: 384 MS
SODIUM SERPL-SCNC: 137 MMOL/L (ref 136–145)
SODIUM SERPL-SCNC: 139 MMOL/L (ref 136–145)

## 2020-11-20 PROCEDURE — 93005 ELECTROCARDIOGRAM TRACING: CPT

## 2020-11-20 PROCEDURE — C9803 HOPD COVID-19 SPEC COLLECT: HCPCS | Performed by: ANESTHESIOLOGY

## 2020-11-20 PROCEDURE — 36415 COLL VENOUS BLD VENIPUNCTURE: CPT

## 2020-11-20 PROCEDURE — 93010 ELECTROCARDIOGRAM REPORT: CPT | Performed by: INTERNAL MEDICINE

## 2020-11-20 PROCEDURE — 80053 COMPREHEN METABOLIC PANEL: CPT

## 2020-11-20 PROCEDURE — 87081 CULTURE SCREEN ONLY: CPT

## 2020-11-20 PROCEDURE — U0004 COV-19 TEST NON-CDC HGH THRU: HCPCS | Performed by: ANESTHESIOLOGY

## 2020-11-20 PROCEDURE — 71046 X-RAY EXAM CHEST 2 VIEWS: CPT

## 2020-11-20 NOTE — DISCHARGE INSTRUCTIONS
Take the following medications the morning of surgery: INHALER, GABAPENTIN    ARRIVAL TIME: 900AM      If you are on prescription narcotic pain medication to control your pain you may also take that medication the morning of surgery.    General Instructions:  • Do not eat solid food after midnight the night before surgery.  • You may drink clear liquids day of surgery but must stop at least one hour before your hospital arrival time.  • It is beneficial for you to have a clear drink that contains carbohydrates the day of surgery.  We suggest a 12 to 20 ounce bottle of Gatorade or Powerade for non-diabetic patients or a 12 to 20 ounce bottle of G2 or Powerade Zero for diabetic patients. (Pediatric patients, are not advised to drink a 12 to 20 ounce carbohydrate drink)    Clear liquids are liquids you can see through.  Nothing red in color.     Plain water                               Sports drinks  Sodas                                   Gelatin (Jell-O)  Fruit juices without pulp such as white grape juice and apple juice  Popsicles that contain no fruit or yogurt  Tea or coffee (no cream or milk added)  Gatorade / Powerade  G2 / Powerade Zero       • Patients who avoid smoking, chewing tobacco and alcohol for 4 weeks prior to surgery have a reduced risk of post-operative complications.  Quit smoking as many days before surgery as you can.  • Do not smoke, use chewing tobacco or drink alcohol the day of surgery.   • If applicable bring your C-PAP/ BI-PAP machine.  • Bring any papers given to you in the doctor’s office.  • Wear clean comfortable clothes.  • Do not wear contact lenses, false eyelashes or make-up.  Bring a case for your glasses.   • Bring crutches or walker if applicable.  • Remove all piercings.  Leave jewelry and any other valuables at home.  • Hair extensions with metal clips must be removed prior to surgery.  • The Pre-Admission Testing nurse will instruct you to bring medications if unable to  obtain an accurate list in Pre-Admission Testing.            Preventing a Surgical Site Infection:  • For 2 to 3 days before surgery, avoid shaving with a razor because the razor can irritate skin and make it easier to develop an infection.    • Any areas of open skin can increase the risk of a post-operative wound infection by allowing bacteria to enter and travel throughout the body.  Notify your surgeon if you have any skin wounds / rashes even if it is not near the expected surgical site.  The area will need assessed to determine if surgery should be delayed until it is healed.  • The night prior to surgery shower using a fresh bar of anti-bacterial soap (such as Dial) and clean washcloth.  Sleep in a clean bed with clean clothing.  Do not allow pets to sleep with you.  • Shower on the morning of surgery using a fresh bar of anti-bacterial soap (such as Dial) and clean washcloth.  Dry with a clean towel and dress in clean clothing.  • Ask your surgeon if you will be receiving antibiotics prior to surgery.  • Make sure you, your family, and all healthcare providers clean their hands with soap and water or an alcohol based hand  before caring for you or your wound.    Day of surgery:  Your arrival time is approximately two hours before your scheduled surgery time.  Upon arrival, a Pre-op nurse and Anesthesiologist will review your health history, obtain vital signs, and answer questions you may have.  The only belongings needed at this time will be a list of your home medications and if applicable your C-PAP/BI-PAP machine.  A Pre-op nurse will start an IV and you may receive medication in preparation for surgery, including something to help you relax.     Please be aware that surgery does come with discomfort.  We want to make every effort to control your discomfort so please discuss any uncontrolled symptoms with your nurse.   Your doctor will most likely have prescribed pain medications.      If you  are going home after surgery you will receive individualized written care instructions before being discharged.  A responsible adult must drive you to and from the hospital on the day of your surgery and stay with you for 24 hours.    If you are staying overnight following surgery, you will be transported to your hospital room following the recovery period.  [facility] has all private rooms.    If you have any questions please call Pre-Admission Testing at [phone].  Deductibles and co-payments are collected on the day of service. Please be prepared to pay the required co-pay, deductible or deposit on the day of service as defined by your plan.    Patient Education for Self-Quarantine Process    Following your COVID testing, we strongly recommend that you do not leave your home after you have been tested for COVID except to get medical care. This includes not going to work, school or to public areas.  If this is not possible for you to do please limit your activities to only required outings.  Be sure to wear a mask when you are with other people, practice social distancing and wash your hands frequently.      The following items provide additional details to keep you safe.  • Wash your hands with soap and water frequently for at least 20 seconds.   • Avoid touching your eyes, nose and mouth with unwashed hands.  • Do not share anything - utensils, towels, food from the same bowl.   • Have your own utensils, drinking glass, dishes, towels and bedding.   • Do not have visitors.   • Do use FaceTime to stay in touch with family and friends.  • You should stay in a specific room away from others if possible.   • Stay at least 6 feet away from others in the home if you cannot have a dedicated room to yourself.   • Do not snuggle with your pet. While the CDC says there is no evidence that pets can spread COVID-19 or be infected from humans, it is probably best to avoid “petting, snuggling, being kissed  or licked and sharing food (during self-quarantine)”, according to the CDC.   • Sanitize household surfaces daily. Include all high touch areas (door handles, light switches, phones, countertops, etc.)  • Do not share a bathroom with others, if possible.   • Wear a mask around others in your home if you are unable to stay in a separate room or 6 feet apart. If  you are unable to wear a mask, have your family member wear a mask if they must be within 6 feet of you.   Call your surgeon immediately if you experience any of the following symptoms:  • Sore Throat  • Shortness of Breath or difficulty breathing  • Cough  • Chills  • Body soreness or muscle pain  • Headache  • Fever  • New loss of taste or smell  • Do not arrive for your surgery ill.  Your procedure will need to be rescheduled to another time.  You will need to call your physician before the day of surgery to avoid any unnecessary exposure to hospital staff as well as other patients.

## 2020-11-21 LAB — SARS-COV-2 RNA RESP QL NAA+PROBE: NOT DETECTED

## 2020-11-22 LAB — MRSA SPEC QL CULT: NORMAL

## 2020-11-22 RX ORDER — CLINDAMYCIN PHOSPHATE 900 MG/50ML
900 INJECTION INTRAVENOUS ONCE
Status: COMPLETED | OUTPATIENT
Start: 2020-11-23 | End: 2020-11-23

## 2020-11-23 ENCOUNTER — HOSPITAL ENCOUNTER (OUTPATIENT)
Facility: HOSPITAL | Age: 55
Setting detail: HOSPITAL OUTPATIENT SURGERY
Discharge: HOME | End: 2020-11-23
Attending: ANESTHESIOLOGY | Admitting: ANESTHESIOLOGY

## 2020-11-23 ENCOUNTER — ANESTHESIA (OUTPATIENT)
Dept: PERIOP | Facility: HOSPITAL | Age: 55
End: 2020-11-23

## 2020-11-23 ENCOUNTER — ANESTHESIA EVENT (OUTPATIENT)
Dept: PERIOP | Facility: HOSPITAL | Age: 55
End: 2020-11-23

## 2020-11-23 VITALS
RESPIRATION RATE: 18 BRPM | OXYGEN SATURATION: 95 % | BODY MASS INDEX: 30.13 KG/M2 | TEMPERATURE: 98.6 F | SYSTOLIC BLOOD PRESSURE: 112 MMHG | HEART RATE: 99 BPM | DIASTOLIC BLOOD PRESSURE: 64 MMHG | HEIGHT: 64 IN | WEIGHT: 176.5 LBS

## 2020-11-23 DIAGNOSIS — G89.18 POSTOPERATIVE PAIN: Primary | ICD-10-CM

## 2020-11-23 LAB — GLUCOSE BLDC GLUCOMTR-MCNC: 124 MG/DL (ref 70–130)

## 2020-11-23 PROCEDURE — 25010000002 PROPOFOL 10 MG/ML EMULSION: Performed by: NURSE ANESTHETIST, CERTIFIED REGISTERED

## 2020-11-23 PROCEDURE — 25010000002 FENTANYL CITRATE (PF) 100 MCG/2ML SOLUTION: Performed by: ANESTHESIOLOGY

## 2020-11-23 PROCEDURE — 82962 GLUCOSE BLOOD TEST: CPT

## 2020-11-23 PROCEDURE — 25010000002 MIDAZOLAM PER 1 MG: Performed by: ANESTHESIOLOGY

## 2020-11-23 PROCEDURE — 25010000002 GENTAMICIN PER 80 MG: Performed by: ANESTHESIOLOGY

## 2020-11-23 PROCEDURE — 62365 REMOVE SPINE INFUSION DEVICE: CPT | Performed by: ANESTHESIOLOGY

## 2020-11-23 PROCEDURE — S0260 H&P FOR SURGERY: HCPCS | Performed by: ANESTHESIOLOGY

## 2020-11-23 PROCEDURE — 25010000002 HYDROMORPHONE PER 4 MG: Performed by: NURSE ANESTHETIST, CERTIFIED REGISTERED

## 2020-11-23 DEVICE — ABSORBABLE HEMOSTAT (OXIDIZED REGENERATED CELLULOSE, U.S.P.)
Type: IMPLANTABLE DEVICE | Site: ABDOMEN | Status: FUNCTIONAL
Brand: SURGICEL

## 2020-11-23 RX ORDER — FLUMAZENIL 0.1 MG/ML
0.2 INJECTION INTRAVENOUS AS NEEDED
Status: DISCONTINUED | OUTPATIENT
Start: 2020-11-23 | End: 2020-11-23 | Stop reason: SDUPTHER

## 2020-11-23 RX ORDER — HYDROMORPHONE HYDROCHLORIDE 1 MG/ML
0.5 INJECTION, SOLUTION INTRAMUSCULAR; INTRAVENOUS; SUBCUTANEOUS
Status: DISCONTINUED | OUTPATIENT
Start: 2020-11-23 | End: 2020-11-23 | Stop reason: SDUPTHER

## 2020-11-23 RX ORDER — PROMETHAZINE HYDROCHLORIDE 12.5 MG/1
25 TABLET ORAL ONCE AS NEEDED
Status: DISCONTINUED | OUTPATIENT
Start: 2020-11-23 | End: 2020-11-23 | Stop reason: SDUPTHER

## 2020-11-23 RX ORDER — PROMETHAZINE HYDROCHLORIDE 25 MG/1
25 SUPPOSITORY RECTAL ONCE AS NEEDED
Status: DISCONTINUED | OUTPATIENT
Start: 2020-11-23 | End: 2020-11-23 | Stop reason: SDUPTHER

## 2020-11-23 RX ORDER — BUPIVACAINE HYDROCHLORIDE AND EPINEPHRINE 5; 5 MG/ML; UG/ML
INJECTION, SOLUTION PERINEURAL AS NEEDED
Status: DISCONTINUED | OUTPATIENT
Start: 2020-11-23 | End: 2020-11-23 | Stop reason: HOSPADM

## 2020-11-23 RX ORDER — SODIUM CHLORIDE, SODIUM LACTATE, POTASSIUM CHLORIDE, CALCIUM CHLORIDE 600; 310; 30; 20 MG/100ML; MG/100ML; MG/100ML; MG/100ML
9 INJECTION, SOLUTION INTRAVENOUS CONTINUOUS PRN
Status: DISCONTINUED | OUTPATIENT
Start: 2020-11-23 | End: 2020-11-23 | Stop reason: HOSPADM

## 2020-11-23 RX ORDER — DIPHENHYDRAMINE HCL 25 MG
25 CAPSULE ORAL
Status: DISCONTINUED | OUTPATIENT
Start: 2020-11-23 | End: 2020-11-23 | Stop reason: HOSPADM

## 2020-11-23 RX ORDER — HYDROCODONE BITARTRATE AND ACETAMINOPHEN 10; 325 MG/1; MG/1
1 TABLET ORAL EVERY 6 HOURS PRN
Qty: 40 TABLET | Refills: 0 | Status: SHIPPED | OUTPATIENT
Start: 2020-11-23 | End: 2020-12-15

## 2020-11-23 RX ORDER — FLUMAZENIL 0.1 MG/ML
0.2 INJECTION INTRAVENOUS AS NEEDED
Status: DISCONTINUED | OUTPATIENT
Start: 2020-11-23 | End: 2020-11-23 | Stop reason: HOSPADM

## 2020-11-23 RX ORDER — DIPHENHYDRAMINE HCL 25 MG
25 CAPSULE ORAL
Status: DISCONTINUED | OUTPATIENT
Start: 2020-11-23 | End: 2020-11-23 | Stop reason: SDUPTHER

## 2020-11-23 RX ORDER — NALOXONE HCL 0.4 MG/ML
0.2 VIAL (ML) INJECTION AS NEEDED
Status: DISCONTINUED | OUTPATIENT
Start: 2020-11-23 | End: 2020-11-23 | Stop reason: HOSPADM

## 2020-11-23 RX ORDER — HYDROMORPHONE HYDROCHLORIDE 1 MG/ML
0.5 INJECTION, SOLUTION INTRAMUSCULAR; INTRAVENOUS; SUBCUTANEOUS
Status: DISCONTINUED | OUTPATIENT
Start: 2020-11-23 | End: 2020-11-23 | Stop reason: HOSPADM

## 2020-11-23 RX ORDER — FENTANYL CITRATE 50 UG/ML
50 INJECTION, SOLUTION INTRAMUSCULAR; INTRAVENOUS
Status: DISCONTINUED | OUTPATIENT
Start: 2020-11-23 | End: 2020-11-23 | Stop reason: HOSPADM

## 2020-11-23 RX ORDER — NALOXONE HCL 0.4 MG/ML
0.2 VIAL (ML) INJECTION AS NEEDED
Status: DISCONTINUED | OUTPATIENT
Start: 2020-11-23 | End: 2020-11-23 | Stop reason: SDUPTHER

## 2020-11-23 RX ORDER — FAMOTIDINE 10 MG/ML
20 INJECTION, SOLUTION INTRAVENOUS
Status: COMPLETED | OUTPATIENT
Start: 2020-11-23 | End: 2020-11-23

## 2020-11-23 RX ORDER — EPHEDRINE SULFATE 50 MG/ML
5 INJECTION, SOLUTION INTRAVENOUS ONCE AS NEEDED
Status: DISCONTINUED | OUTPATIENT
Start: 2020-11-23 | End: 2020-11-23 | Stop reason: HOSPADM

## 2020-11-23 RX ORDER — LABETALOL HYDROCHLORIDE 5 MG/ML
5 INJECTION, SOLUTION INTRAVENOUS
Status: DISCONTINUED | OUTPATIENT
Start: 2020-11-23 | End: 2020-11-23 | Stop reason: SDUPTHER

## 2020-11-23 RX ORDER — DIPHENHYDRAMINE HYDROCHLORIDE 50 MG/ML
12.5 INJECTION INTRAMUSCULAR; INTRAVENOUS
Status: DISCONTINUED | OUTPATIENT
Start: 2020-11-23 | End: 2020-11-23 | Stop reason: SDUPTHER

## 2020-11-23 RX ORDER — PROMETHAZINE HYDROCHLORIDE 12.5 MG/1
25 TABLET ORAL ONCE AS NEEDED
Status: DISCONTINUED | OUTPATIENT
Start: 2020-11-23 | End: 2020-11-23 | Stop reason: HOSPADM

## 2020-11-23 RX ORDER — HYDROCODONE BITARTRATE AND ACETAMINOPHEN 7.5; 325 MG/1; MG/1
1 TABLET ORAL ONCE AS NEEDED
Status: DISCONTINUED | OUTPATIENT
Start: 2020-11-23 | End: 2020-11-23 | Stop reason: SDUPTHER

## 2020-11-23 RX ORDER — LEVOFLOXACIN 750 MG/1
750 TABLET ORAL DAILY
Qty: 5 TABLET | Refills: 0 | Status: SHIPPED | OUTPATIENT
Start: 2020-11-23 | End: 2021-06-07 | Stop reason: HOSPADM

## 2020-11-23 RX ORDER — SODIUM CHLORIDE 0.9 % (FLUSH) 0.9 %
10 SYRINGE (ML) INJECTION EVERY 12 HOURS SCHEDULED
Status: DISCONTINUED | OUTPATIENT
Start: 2020-11-23 | End: 2020-11-23 | Stop reason: HOSPADM

## 2020-11-23 RX ORDER — OXYCODONE AND ACETAMINOPHEN 7.5; 325 MG/1; MG/1
1 TABLET ORAL ONCE AS NEEDED
Status: DISCONTINUED | OUTPATIENT
Start: 2020-11-23 | End: 2020-11-23 | Stop reason: SDUPTHER

## 2020-11-23 RX ORDER — ONDANSETRON 2 MG/ML
4 INJECTION INTRAMUSCULAR; INTRAVENOUS ONCE AS NEEDED
Status: DISCONTINUED | OUTPATIENT
Start: 2020-11-23 | End: 2020-11-23 | Stop reason: HOSPADM

## 2020-11-23 RX ORDER — LIDOCAINE HYDROCHLORIDE 20 MG/ML
INJECTION, SOLUTION INFILTRATION; PERINEURAL AS NEEDED
Status: DISCONTINUED | OUTPATIENT
Start: 2020-11-23 | End: 2020-11-23 | Stop reason: SURG

## 2020-11-23 RX ORDER — MIDAZOLAM HYDROCHLORIDE 1 MG/ML
1 INJECTION INTRAMUSCULAR; INTRAVENOUS
Status: DISCONTINUED | OUTPATIENT
Start: 2020-11-23 | End: 2020-11-23 | Stop reason: HOSPADM

## 2020-11-23 RX ORDER — SODIUM CHLORIDE, SODIUM LACTATE, POTASSIUM CHLORIDE, CALCIUM CHLORIDE 600; 310; 30; 20 MG/100ML; MG/100ML; MG/100ML; MG/100ML
INJECTION, SOLUTION INTRAVENOUS CONTINUOUS PRN
Status: DISCONTINUED | OUTPATIENT
Start: 2020-11-23 | End: 2020-11-23 | Stop reason: SURG

## 2020-11-23 RX ORDER — PROMETHAZINE HYDROCHLORIDE 25 MG/1
25 SUPPOSITORY RECTAL ONCE AS NEEDED
Status: DISCONTINUED | OUTPATIENT
Start: 2020-11-23 | End: 2020-11-23 | Stop reason: HOSPADM

## 2020-11-23 RX ORDER — OXYCODONE AND ACETAMINOPHEN 7.5; 325 MG/1; MG/1
1 TABLET ORAL ONCE AS NEEDED
Status: DISCONTINUED | OUTPATIENT
Start: 2020-11-23 | End: 2020-11-23 | Stop reason: HOSPADM

## 2020-11-23 RX ORDER — DIPHENHYDRAMINE HYDROCHLORIDE 50 MG/ML
12.5 INJECTION INTRAMUSCULAR; INTRAVENOUS
Status: DISCONTINUED | OUTPATIENT
Start: 2020-11-23 | End: 2020-11-23 | Stop reason: HOSPADM

## 2020-11-23 RX ORDER — MIDAZOLAM HYDROCHLORIDE 1 MG/ML
1 INJECTION INTRAMUSCULAR; INTRAVENOUS
Status: COMPLETED | OUTPATIENT
Start: 2020-11-23 | End: 2020-11-23

## 2020-11-23 RX ORDER — EPHEDRINE SULFATE 50 MG/ML
5 INJECTION, SOLUTION INTRAVENOUS ONCE AS NEEDED
Status: DISCONTINUED | OUTPATIENT
Start: 2020-11-23 | End: 2020-11-23 | Stop reason: SDUPTHER

## 2020-11-23 RX ORDER — PROPOFOL 10 MG/ML
VIAL (ML) INTRAVENOUS CONTINUOUS PRN
Status: DISCONTINUED | OUTPATIENT
Start: 2020-11-23 | End: 2020-11-23 | Stop reason: SURG

## 2020-11-23 RX ORDER — SODIUM CHLORIDE 0.9 % (FLUSH) 0.9 %
10 SYRINGE (ML) INJECTION AS NEEDED
Status: DISCONTINUED | OUTPATIENT
Start: 2020-11-23 | End: 2020-11-23 | Stop reason: HOSPADM

## 2020-11-23 RX ORDER — HYDROCODONE BITARTRATE AND ACETAMINOPHEN 7.5; 325 MG/1; MG/1
1 TABLET ORAL ONCE AS NEEDED
Status: COMPLETED | OUTPATIENT
Start: 2020-11-23 | End: 2020-11-23

## 2020-11-23 RX ORDER — LABETALOL HYDROCHLORIDE 5 MG/ML
5 INJECTION, SOLUTION INTRAVENOUS
Status: DISCONTINUED | OUTPATIENT
Start: 2020-11-23 | End: 2020-11-23 | Stop reason: HOSPADM

## 2020-11-23 RX ORDER — FENTANYL CITRATE 50 UG/ML
50 INJECTION, SOLUTION INTRAMUSCULAR; INTRAVENOUS
Status: DISCONTINUED | OUTPATIENT
Start: 2020-11-23 | End: 2020-11-23 | Stop reason: SDUPTHER

## 2020-11-23 RX ORDER — ONDANSETRON 2 MG/ML
4 INJECTION INTRAMUSCULAR; INTRAVENOUS ONCE AS NEEDED
Status: DISCONTINUED | OUTPATIENT
Start: 2020-11-23 | End: 2020-11-23 | Stop reason: SDUPTHER

## 2020-11-23 RX ADMIN — CLINDAMYCIN PHOSPHATE 900 MG: 18 INJECTION, SOLUTION INTRAMUSCULAR; INTRAVENOUS at 09:54

## 2020-11-23 RX ADMIN — HYDROMORPHONE HYDROCHLORIDE 0.5 MG: 1 INJECTION, SOLUTION INTRAMUSCULAR; INTRAVENOUS; SUBCUTANEOUS at 11:39

## 2020-11-23 RX ADMIN — FENTANYL CITRATE 50 MCG: 50 INJECTION INTRAMUSCULAR; INTRAVENOUS at 09:53

## 2020-11-23 RX ADMIN — SODIUM CHLORIDE, POTASSIUM CHLORIDE, SODIUM LACTATE AND CALCIUM CHLORIDE 9 ML/HR: 600; 310; 30; 20 INJECTION, SOLUTION INTRAVENOUS at 09:39

## 2020-11-23 RX ADMIN — MIDAZOLAM 1 MG: 1 INJECTION INTRAMUSCULAR; INTRAVENOUS at 09:40

## 2020-11-23 RX ADMIN — FAMOTIDINE 20 MG: 10 INJECTION INTRAVENOUS at 09:40

## 2020-11-23 RX ADMIN — FENTANYL CITRATE 50 MCG: 50 INJECTION INTRAMUSCULAR; INTRAVENOUS at 11:10

## 2020-11-23 RX ADMIN — SODIUM CHLORIDE, POTASSIUM CHLORIDE, SODIUM LACTATE AND CALCIUM CHLORIDE: 600; 310; 30; 20 INJECTION, SOLUTION INTRAVENOUS at 09:11

## 2020-11-23 RX ADMIN — HYDROMORPHONE HYDROCHLORIDE 0.5 MG: 1 INJECTION, SOLUTION INTRAMUSCULAR; INTRAVENOUS; SUBCUTANEOUS at 11:25

## 2020-11-23 RX ADMIN — FENTANYL CITRATE 50 MCG: 50 INJECTION INTRAMUSCULAR; INTRAVENOUS at 10:57

## 2020-11-23 RX ADMIN — LIDOCAINE HYDROCHLORIDE 80 MG: 20 INJECTION, SOLUTION INFILTRATION; PERINEURAL at 09:58

## 2020-11-23 RX ADMIN — FENTANYL CITRATE 50 MCG: 50 INJECTION INTRAMUSCULAR; INTRAVENOUS at 09:58

## 2020-11-23 RX ADMIN — MIDAZOLAM 1 MG: 1 INJECTION INTRAMUSCULAR; INTRAVENOUS at 09:53

## 2020-11-23 RX ADMIN — HYDROCODONE BITARTRATE AND ACETAMINOPHEN 1 TABLET: 7.5; 325 TABLET ORAL at 11:52

## 2020-11-23 RX ADMIN — MIDAZOLAM 1 MG: 1 INJECTION INTRAMUSCULAR; INTRAVENOUS at 09:58

## 2020-11-23 RX ADMIN — PROPOFOL 160 MCG/KG/MIN: 10 INJECTION, EMULSION INTRAVENOUS at 09:58

## 2020-11-23 NOTE — ANESTHESIA PREPROCEDURE EVALUATION
Anesthesia Evaluation     Patient summary reviewed   NPO Solid Status: > 8 hours             Airway   Mallampati: II  Neck ROM: full  No difficulty expected  Dental    (+) edentulous    Pulmonary    (+) asthma,  Cardiovascular     Rhythm: regular    (+) valvular problems/murmurs (AS- area 0.9cm2),       Neuro/Psych  GI/Hepatic/Renal/Endo    (+)   diabetes mellitus,     Musculoskeletal     (+) back pain,   Abdominal    Substance History      OB/GYN          Other   blood dyscrasia (hodgkins lymphoma),   history of cancer remission                    Anesthesia Plan    ASA 3     MAC       Anesthetic plan, all risks, benefits, and alternatives have been provided, discussed and informed consent has been obtained with: patient.  Use of blood products discussed with patient .

## 2020-11-23 NOTE — ANESTHESIA POSTPROCEDURE EVALUATION
Patient: Isha Llanes    Procedure Summary     Date: 11/23/20 Room / Location: [facility] OR  / [facility] MAIN OR    Anesthesia Start: 0950 Anesthesia Stop: 1120    Procedure: pain pump removal (N/A ) Diagnosis:       End of battery life of intrathecal infusion pump      (End of battery life of intrathecal infusion pump [Z46.2])    Surgeon: Horace Rios MD Provider: Karine Boss MD    Anesthesia Type: MAC ASA Status: 3          Anesthesia Type: MAC    Vitals  Vitals Value Taken Time   /64 11/23/20 1150   Temp     Pulse 99 11/23/20 1150   Resp 18 11/23/20 1150   SpO2 96 % 11/23/20 1203   Vitals shown include unvalidated device data.        Post Anesthesia Care and Evaluation    Patient location during evaluation: bedside  Patient participation: complete - patient participated  Level of consciousness: awake  Pain management: adequate  Airway patency: patent  Anesthetic complications: No anesthetic complications    Cardiovascular status: acceptable  Respiratory status: acceptable  Hydration status: acceptable

## 2020-11-23 NOTE — BRIEF OP NOTE
PAIN PUMP INSERTION/REVISION  Procedure Note    Isha Llanes  11/23/2020    Pre-op Diagnosis:   End of life of intrathecal pump    Post-op Diagnosis:     Post-Op Diagnosis Codes:     * End of battery life of intrathecal infusion pump [Z46.2]    Procedure(s):  pain pump removal    Surgeon(s):  Horace Rios MD    Anesthesia: Monitored Anesthesia Care    Staff:   Circulator: Jovany Mckenzie RN  Scrub Person: Carlene William; Yuliya Adair    Estimated Blood Loss: minimal    Specimens:                Her old Synchromed ii pump      Drains: * No LDAs found *    Findings: removed as above    Complications: no      Horace Rios MD     Date: 11/23/2020  Time: 11:21 EST

## 2020-11-23 NOTE — OP NOTE
Intrathecal Pump [place] Removal  [facility]    Preop Dx: End of life of intrathecal pump.  Postop Dx: Same as preop dx    CPT:  25872 - removal of subcutaneous reservoir    Neuromodulation System:  [employer]tronic  Pump Type Removed:  Synchromed II  Pump Location:  Abdomen on the Left       Preprocedure Discussion with Patient:  Risks and complications were discussed with the patient prior to starting the procedure and informed consent was obtained.  We discussed various topics including but not limited to bleeding, infection, injury, allergic reactions, cerebrospinal fluid leak & potential associated headache as well as neurologic deficit, spinal cord and/or neural injury, implant site pain, post procedural site soreness, equipment fracture or inability to be removed, and risk of worsening clinical picture.  We discussed that the pump device as an alternative could be left in place but weighing risk/benefit she felt she would benefit from taking it out.  We discussed in detail that the prevailing professional consensus in our professional societies is to leave the catheter in place in the spine as to attempt to avoid risk of a dural rent, and we discussed the fact that the old catheter would be left indwelling as well as potential risks of spinal fluid leak into the previous pocket and potential risk of infection.  The patient voices understanding to all.      Surgeon:  Horace Rios MD    Reason for procedure:  He has a new pump & catheter system that is functioning.  Expressed discomfort with the old pump and this was related with flipping in the abdomen and she seems to be in a place to remove it and wishes to do so.      Anesthesia/Sedation:  Monitored Anesthesia Care with Local Anesthetic  Antibiotics:     Clindamycin 900mg IV prior to incision    Description of Procedure:    After obtaining informed consent, IV access had been obtained by nursing staff in the preoperative area.  The patient was  transported to the operative suite and was placed in a supine position.  Appropriate padding was placed around and under the patient.  Anesthesia care and cardiopulmonary monitoring maintained by member of the anesthesiology team throughout the procedure.  Perioperative antibodies were given prior to the incision per routine as indicated in the anesthesia record and indicated above.  The patient's surgical site was cleansed appropriately with routine cleansing, and then prepped in a sterile routine fashion.  The area was then draped in sterile routine fashion.     The area overlying the IT pump was identified and the midline/equator of the device was marked on the skin.  The subcutaneous tissue and skin along this line was anesthetized with approximately 10 ml of local anesthetic.  Also another approximate 10 mL was placed in the subcutaneous tissue cephalad and caudad to this marked incision line.  A #15 scalpel was used to dissect down to the level of the pump.  Careful dissection was utilized to release the device and the distal portion of the catheter from the pocket capsule.  The catheter was released from the pump, and the pump was then placed on the back table and prepared for cleansing in order to return the device to the neuromodulation  for testing if they so desire.  After removal of the pump, the electrocautery was connected and then used judiciously to control any bleeding, and to release the pump pocket appropriately to free all the appropriate catheter length within the pocket in order to allow the catheter end to be tied and affixed into the pump pocket.      The free end of the catheter was folded on itself and was tied off in routine fashion with several stitches and then affixed to the pocket capsule with another suture.  Once more, the incision site was irrigated with copious amounts of irrigant solution and suctioned.   The pump pocket site incision was closed with 2-0 Vicryl  interrupted sutures for the underlying layer, 2-0 Vicryl interrupted sutures for the deep layer and 3-0 Vicryl running suture for the subcutaneous layer and then dressed with Dermabond, Telfa and Tegaderm.      Estimated Blood Loss: minimal  Specimens: As above, including the IT pump and pump catheter segment  Complications: No complications were noted.    Tolerance and discharge condition:    The patient tolerated the procedure well and was awakened from anesthesia without incident.  The patient was transported to the recovery area without difficulties. The patient was discharged home under the care of family members in stable and satisfactory condition.    Plan of care:    -- The patient was given our standard instruction sheet and will resume all medications as per the medication reconciliation sheet.    -- Return to clinic 2 wks  -- The patient will present to my office in 10-14 days for a postoperative wound check.  -- Further planning for any future pain management strategies will be initiated at that time, and we will begin to formulate a new plan of care as appropriate.    -- The patient understands that there is any signs of complication, bleeding, infection, fever, chills, increasing back pain or spine pain, any neurologic deficit, or any other concerning finding, that they are family member should call my office at [phone] at any time to access the answering service.

## 2020-11-24 ENCOUNTER — PRIOR AUTHORIZATION (OUTPATIENT)
Dept: PAIN MEDICINE | Facility: CLINIC | Age: 55
End: 2020-11-24

## 2020-11-30 ENCOUNTER — OFFICE VISIT (OUTPATIENT)
Dept: PAIN MEDICINE | Facility: CLINIC | Age: 55
End: 2020-11-30

## 2020-11-30 ENCOUNTER — RESULTS ENCOUNTER (OUTPATIENT)
Dept: PAIN MEDICINE | Facility: CLINIC | Age: 55
End: 2020-11-30

## 2020-11-30 VITALS
RESPIRATION RATE: 20 BRPM | HEART RATE: 87 BPM | BODY MASS INDEX: 30.3 KG/M2 | DIASTOLIC BLOOD PRESSURE: 66 MMHG | HEIGHT: 64 IN | TEMPERATURE: 97.1 F | SYSTOLIC BLOOD PRESSURE: 108 MMHG | OXYGEN SATURATION: 95 %

## 2020-11-30 DIAGNOSIS — G89.4 CHRONIC PAIN SYNDROME: ICD-10-CM

## 2020-11-30 DIAGNOSIS — Z97.8 PRESENCE OF INTRATHECAL PUMP: ICD-10-CM

## 2020-11-30 DIAGNOSIS — M25.559 HIP PAIN: ICD-10-CM

## 2020-11-30 DIAGNOSIS — M53.3 SACROILIAC JOINT DYSFUNCTION: ICD-10-CM

## 2020-11-30 DIAGNOSIS — G89.4 CHRONIC PAIN SYNDROME: Primary | ICD-10-CM

## 2020-11-30 PROBLEM — Z46.2 END OF BATTERY LIFE OF INTRATHECAL INFUSION PUMP: Status: RESOLVED | Noted: 2020-11-04 | Resolved: 2020-11-30

## 2020-11-30 PROCEDURE — 62369 ANAL SP INF PMP W/REPRG&FILL: CPT | Performed by: NURSE PRACTITIONER

## 2020-11-30 PROCEDURE — 99024 POSTOP FOLLOW-UP VISIT: CPT | Performed by: NURSE PRACTITIONER

## 2020-11-30 RX ORDER — CLINDAMYCIN HYDROCHLORIDE 300 MG/1
300 CAPSULE ORAL 3 TIMES DAILY
Qty: 21 CAPSULE | Refills: 0 | Status: ON HOLD | OUTPATIENT
Start: 2020-11-30 | End: 2021-06-07 | Stop reason: SDUPTHER

## 2020-11-30 RX ORDER — PANCRELIPASE 60000; 12000; 38000 [USP'U]/1; [USP'U]/1; [USP'U]/1
CAPSULE, DELAYED RELEASE PELLETS ORAL
COMMUNITY
Start: 2020-11-27 | End: 2021-07-14

## 2020-11-30 RX ORDER — BLOOD SUGAR DIAGNOSTIC
STRIP MISCELLANEOUS
COMMUNITY
Start: 2020-11-21 | End: 2021-09-06

## 2020-11-30 NOTE — PROGRESS NOTES
"CHIEF COMPLAINT  F/u back pain. Pt here for pump refill. Pt had pain pump removal on 11/23/20.    Subjective   Isha Llanes is a 54 y.o. female  who presents for follow-up.  She has a history of chronic back pain. Reports her pain is WORSE since last evaluation. Reports between surgery and cold/wet weather    Had previous IT pump removal on 11-23-20 with Dr Rios. Was prescribed Hydrocodone 10/325 #40 for post-operative pain.    Complains of pain in her low back. Today her pain is 7/10VAS. Describes her pain as continuous throbbing. Pain increases with activity, household chores; pain decreases with medication, rest and pump. Continues with Hydrocodone 7.5/325 BID.  However, she was prescribed Hydrocodone 10/325 q6hrs PRN #40 post-operatively. She admits she is out of the Hydrocodone 10/325 \"I had originally thought it was every 4 hours.\" She reports she ran our of this on 11-28-20.   Denies any side effects from the regimen.  Notes mild relief with the regimen. ADL's by self.     She asked multiple times for refills of her pain medication. Reviewed multiple times at length that she would have been due for a refill today for Hydrocodone 7.5/325 BID today BUT she received a 10 day supply of Hydrocodone 10/325 on 11-23-20. Reviewed with Dr Rios. No plans for early refills at this time.     Patient remained masked during entire encounter. No cough present. I donned a mask and eye protection throughout entire visit. Prior to donning mask and eye protection, hand hygiene was performed, as well as when it was doffed.  I was closer than 6 feet, but not for an extended period of time. No obvious exposure to any bodily fluids.    Back Pain  This is a chronic problem. The current episode started more than 1 year ago. The problem has been gradually worsening since onset. The pain is present in the lumbar spine. The quality of the pain is described as aching and stabbing. The pain is at a severity of 7/10. The " symptoms are aggravated by bending, sitting, standing, twisting and position. Associated symptoms include pelvic pain (to see oncologist) and weakness. Pertinent negatives include no abdominal pain, chest pain, dysuria, fever, headaches or numbness. Risk factors include lack of exercise, poor posture, sedentary lifestyle and history of cancer. She has tried analgesics, bed rest, heat and ice for the symptoms. The treatment provided no relief.   Hip Pain   There was no injury mechanism. The pain is present in the left hip and right hip. The pain is at a severity of 7/10. The pain has been worsening since onset. Associated symptoms include an inability to bear weight and a loss of motion. Pertinent negatives include no numbness.      The following portions of the patient's history were reviewed and updated as appropriate: allergies, current medications, past family history, past medical history, past social history, past surgical history and problem list.    Review of Systems   Constitutional: Negative for activity change, fatigue and fever.   HENT: Negative for congestion.    Eyes: Negative for visual disturbance.   Respiratory: Negative for cough and shortness of breath.    Cardiovascular: Negative for chest pain.   Gastrointestinal: Negative for abdominal pain, constipation and diarrhea.   Endocrine: Negative for polyuria.   Genitourinary: Positive for pelvic pain (to see oncologist). Negative for difficulty urinating and dysuria.   Musculoskeletal: Positive for back pain and gait problem (w/c).   Allergic/Immunologic: Negative for immunocompromised state.   Neurological: Positive for weakness. Negative for dizziness, light-headedness, numbness and headaches.   Psychiatric/Behavioral: Negative for agitation, behavioral problems, sleep disturbance and suicidal ideas. The patient is not nervous/anxious.      I have reviewed and confirmed the accuracy of the ROS as documented by the MA/LPN/RN Blanca Pollard,  "APRN      Vitals:    11/30/20 1424   BP: 108/66   Pulse: 87   Resp: 20   Temp: 97.1 °F (36.2 °C)   SpO2: 95%   Weight: Comment: pt sts doesn't know current wt   Height: 162.6 cm (64\")   PainSc:   7   PainLoc: Back     Objective   Physical Exam  Vitals signs and nursing note reviewed.   Constitutional:       Appearance: Normal appearance. She is well-developed.   HENT:      Head: Normocephalic and atraumatic.      Nose: Nose normal.   Eyes:      General: Lids are normal.      Conjunctiva/sclera: Conjunctivae normal.   Neck:      Musculoskeletal: Full passive range of motion without pain, normal range of motion and neck supple.      Trachea: Trachea normal.   Cardiovascular:      Rate and Rhythm: Normal rate and regular rhythm.      Heart sounds: Normal heart sounds.   Pulmonary:      Effort: Pulmonary effort is normal. No respiratory distress.   Musculoskeletal:      Right hip: She exhibits decreased range of motion.      Left hip: She exhibits decreased range of motion.      Lumbar back: She exhibits decreased range of motion and tenderness.        Back:    Skin:     General: Skin is warm and dry.          Neurological:      Mental Status: She is alert and oriented to person, place, and time.      Gait: Gait abnormal (motorized wheelchair).   Psychiatric:         Mood and Affect: Mood and affect normal.         Speech: Speech normal.         Behavior: Behavior normal. Behavior is cooperative.         Assessment/Plan   Diagnoses and all orders for this visit:    1. Chronic pain syndrome (Primary)  -     Oral Fluid Drug Screen - Saliva, Oral Cavity; Future    2. Sacroiliac joint dysfunction  -     Oral Fluid Drug Screen - Saliva, Oral Cavity; Future    3. Hip pain  -     Oral Fluid Drug Screen - Saliva, Oral Cavity; Future    4. Presence of intrathecal pump  -     Oral Fluid Drug Screen - Saliva, Oral Cavity; Future    Other orders  -     clindamycin (Cleocin) 300 MG capsule; Take 1 capsule by mouth 3 (Three) Times a " Day.  Dispense: 21 capsule; Refill: 0      --- The urine drug screen confirmation from 10-28-20 has been reviewed and the result is ABNORMAL based on patient history and TERRELL report. Per dr lopez, repeat drug screen today. Patient states she could not void. ODT offered.  --- Refill pump.  No changes at this time.   ---  Reviewed signs and symptoms of infection, including but not limited to-- fever, chills, stiff neck, headache, flu-like symptoms, increased tenderness and redness to incisions, as well as drainage or swelling. If patient notices this, she is to call office immediately. Patient verbalized understanding.  --- Discussed with DR Lopez-- presented to patient room--- Clindamycin 300 mg TID x 7 days. Discussed medication with the patient.  Included in this discussion was the potential for side effects and adverse events.  Patient verbalized understanding and wished to proceed.  Prescription will be sent to pharmacy.  --- Follow-up 10 days or sooner if needed.       TERRELL REPORT  As part of the patient's treatment plan, I am prescribing controlled substances. The patient has been made aware of appropriate use of such medications, including potential risk of somnolence, limited ability to drive and/or work safely, and the potential for dependence or overdose. It has also bee made clear that these medications are for use by this patient only, without concomitant use of alcohol or other substances unless prescribed.     Patient has completed prescribing agreement detailing terms of continued prescribing of controlled substances, including monitoring TERRELL reports, urine drug screening, and pill counts if necessary. The patient is aware that inappropriate use will results in cessation of prescribing such medications.    TERRELL report has been reviewed and scanned into the patient's chart.    As the clinician, I personally reviewed the TERRELL from 11-30-20 while the patient was in the office  today.    History and physical exam exhibit continued safe and appropriate use of controlled substances.        EMR Dragon/Transcription disclaimer:   Much of this encounter note is an electronic transcription/translation of spoken language to printed text. The electronic translation of spoken language may permit erroneous, or at times, nonsensical words or phrases to be inadvertently transcribed; Although I have reviewed the note for such errors, some may still exist.

## 2020-12-03 NOTE — PROGRESS NOTES
So when she returns for next office visit when I see her, how would you like me to proceed? Thanks. BB

## 2020-12-03 NOTE — PROGRESS NOTES
Oral test, and HM metabolite is technically consistent.  Objectively it is ok.  But I agree that the orals need to go away.

## 2020-12-08 ENCOUNTER — TELEPHONE (OUTPATIENT)
Dept: PAIN MEDICINE | Facility: CLINIC | Age: 55
End: 2020-12-08

## 2020-12-08 NOTE — PROGRESS NOTES
She really has some nasty pathology, danyel her hip.  I am fine to leave her on some low dose but not escalating

## 2020-12-15 DIAGNOSIS — G89.4 CHRONIC PAIN SYNDROME: ICD-10-CM

## 2020-12-15 RX ORDER — HYDROCODONE BITARTRATE AND ACETAMINOPHEN 7.5; 325 MG/1; MG/1
1 TABLET ORAL 2 TIMES DAILY PRN
Qty: 60 TABLET | Refills: 0 | Status: CANCELLED | OUTPATIENT
Start: 2020-12-15

## 2020-12-15 RX ORDER — HYDROCODONE BITARTRATE AND ACETAMINOPHEN 5; 325 MG/1; MG/1
1 TABLET ORAL 2 TIMES DAILY PRN
Qty: 60 TABLET | Refills: 0 | Status: SHIPPED | OUTPATIENT
Start: 2020-12-15 | End: 2021-07-02

## 2020-12-15 NOTE — TELEPHONE ENCOUNTER
Medication Refill Request    Date of phone call: 2020    Medication being requested: Hydrocodone-apap 7.5/325 mg sixday  Qty: 60    Date of last visit: 2020    Date of last refill: 10/29/2020    TERRELL up to date?: 12/15/2020    Next Follow up?: 2020 w/Elaine    Any new pertinent information? (i.e, new medication allergies, new use of medications, change in patient's health or condition, non-compliance or inconsistency with prescribing agreement?): patient cancelled appointments on 12/10/2020 and 2020 - she received Hydrocodone-apap 10/325 # 40 for PO Pain on 20

## 2020-12-16 ENCOUNTER — TELEPHONE (OUTPATIENT)
Dept: PAIN MEDICINE | Facility: CLINIC | Age: 55
End: 2020-12-16

## 2020-12-17 ENCOUNTER — TELEPHONE CONVERTED (OUTPATIENT)
Dept: INTERNAL MEDICINE | Facility: CLINIC | Age: 55
End: 2020-12-17
Attending: STUDENT IN AN ORGANIZED HEALTH CARE EDUCATION/TRAINING PROGRAM

## 2020-12-21 ENCOUNTER — TELEPHONE (OUTPATIENT)
Dept: PAIN MEDICINE | Facility: CLINIC | Age: 55
End: 2020-12-21

## 2020-12-21 NOTE — TELEPHONE ENCOUNTER
Pt called back stg she has an appt to see wound doctor for her open bleeding leg wounds on 12/23/20. Spoke with Cyndee, she doesn't feel it is appropriate to view incision via telehealth visit, if you are ok viewing pt incision via video visit we can reschedule pt with you next available so she can be seen. Please let me know so I can let Melanie know. Please advise. Thank you.

## 2020-12-21 NOTE — TELEPHONE ENCOUNTER
Pt no showed appt today to check previously infected incision site from pump removal. Pt was on clinda x 7 days. Pt called in stg she has open bleeding leg sores, had virtual visit with PCP today, received 5 day supply antibiotics, unable to come to office visit today. Pt requesting video visit with Cyndee to evaluate incision site after antibiotic therapy. Told pt we need to see her in office, pt insists on doing video visit instead. Pt sts she doesn't want to go to hospital for leg sores, she doesn't want to catch covid 19. I told pt several times we need to reschedule her next week to see her incision site since antibiotic therapy has ended. Pt needs 3 day advance notice for [facility] services prior to scheduling appt. How would you like to proceed?  Please advise. Thank you.

## 2020-12-28 ENCOUNTER — HOSPITAL ENCOUNTER (OUTPATIENT)
Dept: CARDIOLOGY | Facility: HOSPITAL | Age: 55
Discharge: HOME | End: 2020-12-28
Attending: SURGERY

## 2020-12-29 RX ORDER — GABAPENTIN 300 MG/1
300 CAPSULE ORAL 3 TIMES DAILY
Qty: 90 CAPSULE | Refills: 1 | Status: SHIPPED | OUTPATIENT
Start: 2020-12-29 | End: 2021-02-22

## 2020-12-29 NOTE — TELEPHONE ENCOUNTER
Medication Refill Request    Date of phone call: 20    Medication being requested: gabapentin 300mg si caps po tid  Qty: 90    Date of last visit: 20    Date of last refill:     TERRELL up to date?:     Next Follow up?: 21    Any new pertinent information? (i.e, new medication allergies, new use of medications, change in patient's health or condition, non-compliance or inconsistency with prescribing agreement?): can you please fill for Blanca? Thank you.

## 2021-01-05 ENCOUNTER — TELEMEDICINE CONVERTED (OUTPATIENT)
Dept: INTERNAL MEDICINE | Facility: CLINIC | Age: 56
End: 2021-01-05
Attending: STUDENT IN AN ORGANIZED HEALTH CARE EDUCATION/TRAINING PROGRAM

## 2021-01-08 ENCOUNTER — TELEPHONE (OUTPATIENT)
Dept: PAIN MEDICINE | Facility: CLINIC | Age: 56
End: 2021-01-08

## 2021-01-08 NOTE — TELEPHONE ENCOUNTER
It's not so easy to just discharge a pump patient.  Please see how to set her up with the [facility] service for at-home pump refills.

## 2021-01-08 NOTE — TELEPHONE ENCOUNTER
"Patient called and was very upset because she received a NO SHOW letter in the mail. She states she did NOT miss an appointment and she called and cancelled that due to her daughter having COVID. She began to raise her voice and get very upset when I tried to tell her when her next appointment was, stating \"I am very upset with your office, I have NOT missed any appointments, and I called to cancel that.\" Once she calmed down some I was able to tell her when her next appointment was and the time, she states \"I have staph infection in my incision and on my leg with open sores, I guess you all just want me to bring all of this into your office.\" I then reminded her that we cannot treat this without seeing her. She was very upset, she asked \"is my appointment with the Del[facility]?\" I explained that her appointment is with Cyndee, and she states \"I have it out for the last one I saw and I do NOT want to see her again.\" She states \"I am very pissed and upset with the care that the [facility] has given me. I want to see someone else and talk to Dr. Rios over the way Blanca has treated me.\"     I assured her that her appointment is with Cyndee and that she will NOT be treated by Blanca at her next appointment, she then calmed down some and told me she would be here for her appointment on 1/14/21.  "

## 2021-01-11 NOTE — TELEPHONE ENCOUNTER
"I believe I am going to discharge her from my care. I have nothing else to offer this patient. She has NS multiple appointments and [facility] multiple appointments. I am not sure what she means as to \"how the blond has treated me\" etc. I follow same protocols with her as I do with any other pain patient. At this point, it feels more appropriate to discontinue treatment of this patient, especially if she states such hostility towards me.Thanks. BB"

## 2021-01-11 NOTE — TELEPHONE ENCOUNTER
BB, Sounds like she has already fired you first.   Her pump will need to be tanked up before discharge from the practice.  No more oral opioids.  The 30-day clause is not appropriate with a pump patient.  We will need to set her up with home pump refill service, and give her 180 days to find a new group to manage her pump.

## 2021-01-12 ENCOUNTER — HOSPITAL ENCOUNTER (OUTPATIENT)
Dept: WOUND CARE | Facility: HOSPITAL | Age: 56
Setting detail: RECURRING SERIES
Discharge: STILL ADMITTED OR ADMINISTRATIVE | End: 2021-04-12
Attending: SURGERY

## 2021-01-12 NOTE — TELEPHONE ENCOUNTER
[facility] is taking new patients for pain pumps and they do except passport. Their new patient phone number is [phone]

## 2021-01-12 NOTE — TELEPHONE ENCOUNTER
She has an appt with you on the 27th for a pump refill so she wanted to know if you would call her before then

## 2021-01-13 NOTE — TELEPHONE ENCOUNTER
I will give them a call and see if they except her insurance. She was coming on the 27th fpr a post op appt as well. Does she still need to be seen for a post op appt

## 2021-01-13 NOTE — TELEPHONE ENCOUNTER
I called [facility] and they do not accept her insurance but can offer an out of pocket price. I have called [facility] and left a message- waiting on a return call.

## 2021-01-13 NOTE — TELEPHONE ENCOUNTER
Provider: DR. VASQUEZ  Caller: RENÉE ABARCA  Relationship to Patient: SELF  Phone Number: [phone]  Reason for Call: PT RETURNED CALL FOR PRACTICE MANAGER IRT PREV NO-SHOWS AND CONFIRMING THAT HER APPT FOR 01/14 AND 01/27 WAS COMBINED INTO ONE APPT (SCHEDULED FOR 01/27/21). PT ADV CAN BE REACHED ANYTIME.

## 2021-01-14 NOTE — TELEPHONE ENCOUNTER
I called her and she stated that she wants to speak with you before she is set up for anything like this and that she has to discuss this with her daughter prior to allowing anyone come into her home because she has her 6 month old granddaughter in her home. Due to the pandemic she is not sure if her daughter wants anyone in the home.

## 2021-01-21 ENCOUNTER — HOSPITAL ENCOUNTER (OUTPATIENT)
Dept: OTHER | Facility: HOSPITAL | Age: 56
Discharge: HOME | End: 2021-01-21
Attending: PHYSICIAN ASSISTANT

## 2021-01-21 ENCOUNTER — OFFICE VISIT CONVERTED (OUTPATIENT)
Dept: INTERNAL MEDICINE | Facility: CLINIC | Age: 56
End: 2021-01-21
Attending: PHYSICIAN ASSISTANT

## 2021-01-21 LAB
BASOPHILS # BLD AUTO: 0.02 10*3/UL (ref 0–0.2)
BASOPHILS NFR BLD AUTO: 0.3 % (ref 0–3)
CONV ABS IMM GRAN: 0.05 10*3/UL (ref 0–0.2)
CONV IMMATURE GRAN: 0.8 % (ref 0–1.8)
DEPRECATED RDW RBC AUTO: 47 FL (ref 36.4–46.3)
EOSINOPHIL # BLD AUTO: 0.11 10*3/UL (ref 0–0.7)
EOSINOPHIL # BLD AUTO: 1.7 % (ref 0–7)
ERYTHROCYTE [DISTWIDTH] IN BLOOD BY AUTOMATED COUNT: 14.6 % (ref 11.7–14.4)
HCT VFR BLD AUTO: 41.7 % (ref 37–47)
HGB BLD-MCNC: 12.8 G/DL (ref 12–16)
LYMPHOCYTES # BLD AUTO: 1.07 10*3/UL (ref 1–5)
LYMPHOCYTES NFR BLD AUTO: 16.4 % (ref 20–45)
MCH RBC QN AUTO: 27.3 PG (ref 27–31)
MCHC RBC AUTO-ENTMCNC: 30.7 G/DL (ref 33–37)
MCV RBC AUTO: 88.9 FL (ref 81–99)
MONOCYTES # BLD AUTO: 0.33 10*3/UL (ref 0.2–1.2)
MONOCYTES NFR BLD AUTO: 5.1 % (ref 3–10)
NEUTROPHILS # BLD AUTO: 4.94 10*3/UL (ref 2–8)
NEUTROPHILS NFR BLD AUTO: 75.7 % (ref 30–85)
NRBC CBCN: 0 % (ref 0–0.7)
PLATELET # BLD AUTO: 173 10*3/UL (ref 130–400)
PMV BLD AUTO: 11.4 FL (ref 9.4–12.3)
RBC # BLD AUTO: 4.69 10*6/UL (ref 4.2–5.4)
WBC # BLD AUTO: 6.52 10*3/UL (ref 4.8–10.8)

## 2021-01-22 LAB
ALBUMIN SERPL-MCNC: 4 G/DL (ref 3.5–5)
ALBUMIN/GLOB SERPL: 1.2 {RATIO} (ref 1.4–2.6)
ALP SERPL-CCNC: 74 U/L (ref 53–141)
ALT SERPL-CCNC: 16 U/L (ref 10–40)
ANION GAP SERPL CALC-SCNC: 18 MMOL/L (ref 8–19)
AST SERPL-CCNC: 16 U/L (ref 15–50)
BILIRUB SERPL-MCNC: <0.15 MG/DL (ref 0.2–1.3)
BUN SERPL-MCNC: 15 MG/DL (ref 5–25)
BUN/CREAT SERPL: 29 {RATIO} (ref 6–20)
CALCIUM SERPL-MCNC: 9.2 MG/DL (ref 8.7–10.4)
CHLORIDE SERPL-SCNC: 100 MMOL/L (ref 99–111)
CHOLEST SERPL-MCNC: 107 MG/DL (ref 107–200)
CHOLEST/HDLC SERPL: 3.8 {RATIO} (ref 3–6)
CONV CO2: 25 MMOL/L (ref 22–32)
CONV TOTAL PROTEIN: 7.4 G/DL (ref 6.3–8.2)
CREAT UR-MCNC: 0.52 MG/DL (ref 0.5–0.9)
EST. AVERAGE GLUCOSE BLD GHB EST-MCNC: 151 MG/DL
GFR SERPLBLD BASED ON 1.73 SQ M-ARVRAT: >60 ML/MIN/{1.73_M2}
GLOBULIN UR ELPH-MCNC: 3.4 G/DL (ref 2–3.5)
GLUCOSE SERPL-MCNC: 190 MG/DL (ref 65–99)
HBA1C MFR BLD: 6.9 % (ref 3.5–5.7)
HDLC SERPL-MCNC: 28 MG/DL (ref 40–60)
LDLC SERPL CALC-MCNC: 35 MG/DL (ref 70–100)
OSMOLALITY SERPL CALC.SUM OF ELEC: 292 MOSM/KG (ref 273–304)
POTASSIUM SERPL-SCNC: 4.8 MMOL/L (ref 3.5–5.3)
SODIUM SERPL-SCNC: 138 MMOL/L (ref 135–147)
TRIGL SERPL-MCNC: 219 MG/DL (ref 40–150)
VLDLC SERPL-MCNC: 44 MG/DL (ref 5–37)

## 2021-02-22 ENCOUNTER — HOSPITAL ENCOUNTER (OUTPATIENT)
Dept: OTHER | Facility: HOSPITAL | Age: 56
Discharge: HOME | End: 2021-02-22
Attending: INTERNAL MEDICINE

## 2021-02-22 ENCOUNTER — TELEPHONE (OUTPATIENT)
Dept: PAIN MEDICINE | Facility: CLINIC | Age: 56
End: 2021-02-22

## 2021-02-22 RX ORDER — GABAPENTIN 300 MG/1
CAPSULE ORAL
Qty: 270 CAPSULE | Refills: 1 | Status: SHIPPED | OUTPATIENT
Start: 2021-02-22

## 2021-02-22 NOTE — TELEPHONE ENCOUNTER
UNABLE TO WARM TRANSFER    Caller: RENÉE ABARCA  Relationship to Patient: SELF    Phone Number: [phone]  Reason for Call: PATIENT IS CALLING STATING THAT HER PAIN IS INCREASING AND THE PATIENT STATES THAT HER MEDS IN HER PAIN PUMPED WERE UPPED BUT THAT SHE DOES NOT FEEL LIKE THE PAIN PUMP IS CURRENTLY DOING ITS JOB.

## 2021-02-23 NOTE — TELEPHONE ENCOUNTER
Spoke to patient and she verbally understood, she asked if she still needed to find another pain doctor, I explained yes she would need to. She asked if Dr. Rios could call her himself so that she can talk to him about keeping her as a patient. She states he can call her at any time of day or evening hours

## 2021-02-24 LAB
BACTERIA SPEC AEROBE CULT: NORMAL
BACTERIA SPEC AEROBE CULT: NORMAL

## 2021-02-26 ENCOUNTER — OFFICE VISIT CONVERTED (OUTPATIENT)
Dept: INTERNAL MEDICINE | Facility: CLINIC | Age: 56
End: 2021-02-26
Attending: PHYSICIAN ASSISTANT

## 2021-03-05 ENCOUNTER — HOSPITAL ENCOUNTER (OUTPATIENT)
Dept: GENERAL RADIOLOGY | Facility: HOSPITAL | Age: 56
Discharge: HOME | End: 2021-03-05
Attending: PHYSICIAN ASSISTANT

## 2021-03-09 NOTE — TELEPHONE ENCOUNTER
Pt called in today requesting po pain meds from our office. I explained the letter of termination to her, but also informed her care expires on 6/12/21. She sts she is in a lot of pain, cannot sleep at night due to the pain. I asked her if her pain pump is giving her any problems she std no. She sts doctor says her cancer is back, she started crying on the phone. Please advise. What would you like to recommend? Thank you.

## 2021-03-09 NOTE — TELEPHONE ENCOUNTER
PT. CALLING BACK ON 03/09/21. STATES THAT SHE NEEDS TO TALK TO DR. VASQUEZ OR ONE OF THE NURSES ABOUT HER PAIN PUMP.   WARM TRANSFERRED TO THE OFFICE.

## 2021-03-09 NOTE — TELEPHONE ENCOUNTER
Called and spoke with pt. She std she went to her PCP and had an US of her neck done 2 weeks ago. She sts her PCP is also trying to get a CT approved by her insurance. I will call [facility] to obtain US results and have them faxed to our office for you. Told pt at this time no pain meds until you see documentation of cancer diagnosis/ status. Pt verbalized understanding. Please advise. Thank you.

## 2021-03-11 ENCOUNTER — HOSPITAL ENCOUNTER (OUTPATIENT)
Dept: PREADMISSION TESTING | Facility: HOSPITAL | Age: 56
Discharge: HOME | End: 2021-03-11
Attending: INTERNAL MEDICINE

## 2021-03-11 NOTE — TELEPHONE ENCOUNTER
"I should not have said \"normal\". There are large lymph nodes.  What I should have said is that this is not a diagnosis of cancer by itself.  I don't know what her next step is, whether more imaging or biopsy or just following, what is the oncologist going to do, etc.  "

## 2021-03-11 NOTE — TELEPHONE ENCOUNTER
"Called pt and informed her she will not be receiving po pain meds. I told her you reviewed the US of neck, and it was normal. Pt std, \"that's not what my doctor told me, but whatever, so you're telling me that my doctor is a liar?\" I informed pt, I am not stg that I am just explaining that Dr. Rios reviewed your US and has made the decision not to prescribe you po pain meds at this time. Please advise. Thank you. "

## 2021-03-12 LAB — SARS-COV-2 RNA SPEC QL NAA+PROBE: NOT DETECTED

## 2021-04-03 ENCOUNTER — DOCUMENTATION (OUTPATIENT)
Dept: PAIN MEDICINE | Facility: CLINIC | Age: 56
End: 2021-04-03

## 2021-04-03 NOTE — PROGRESS NOTES
Ms. Llanes's daughter, Sheila Watson called the on-call system this weekend inquiring about Ms. Llanes's recent pump change.  Her mother is currently in the PCU at [facility] with symptoms of headache, SOA, fever, fatigue, cough and congestion.  COVID has been ruled out, but there are concerns about sepsis.  Ms. Llanes is unable to speak to me at this time.     I explained that I cannot find documentation to share information with Sheila Watson, so I offered to call the hospital to relay the information.  I spoke with Deepa Fontaine RN who is caring for Isha Llanes at 4:20pm.  I relayed information that was documented in the chart: 3/17 basal rate of intrathecal pump changed by 10% from 6.6mg/day of hydromorphone to 7.2mg/day.  It also appears that in the last few months, PO opioids were stopped.

## 2021-04-04 ENCOUNTER — DOCUMENTATION (OUTPATIENT)
Dept: PAIN MEDICINE | Facility: CLINIC | Age: 56
End: 2021-04-04

## 2021-04-05 NOTE — PROGRESS NOTES
[facility] to ask information about pain pump.  I relayed last reprogramming/refill documented in the media.  Explained concentration, dose and bolus maximum per day.  I asked Liz to contact the clinic tomorrow if she needs the documentation to be faxed for further information.

## 2021-04-07 ENCOUNTER — OFFICE VISIT CONVERTED (OUTPATIENT)
Dept: INTERNAL MEDICINE | Facility: CLINIC | Age: 56
End: 2021-04-07
Attending: PHYSICIAN ASSISTANT

## 2021-05-10 NOTE — H&P
History and Physical      Patient Name: Isha Llanes   Patient ID: [id number]   Sex: Female   YOB: 1965    Primary Care Provider: Damari Cornejo PA-C   Referring Provider: Damari Cornejo PA-C    Visit Date: June 8, 2020    Provider: Bull Martin MD   Location: [facility]   Location Address: [address] [place] [address]  [id number]   Location Phone: [phone]          Chief Complaint  · Bilateral Hip Pain      History Of Present Illness  Isha Llanes is a 54 year old /White female who presents today to [facility]. Patient is here for bilateral hip pain that has been going on for several years. Patient states she has had 2 different pain pumps since 2005 due to increasing pain. Patient uses a motorized wheelchair. Patient states limited walking due to hip pain. Patient is diabetic.       Past Medical History  Allergic rhinitis due to allergen; Anticoagulation Medication Therapy; Anxiety disorder; Arthritis; Asthma; Back Pain ; Bladder Disorder; Bowel Disease; Breast lump; Cancer; CHF (congestive heart failure); Chronic Obstructive Pulmonary Disease; Chronic pain; COPD (chronic obstructive pulmonary disease); Depression; Diabetes; Diabetes mellitus, type 2; GERD (gastroesophageal reflux disease); Heart Disease; Heart murmur; History of Hodgkin's lymphoma; Hyperlipemia; Kidney Disease; Kidney stones; Knee pain; Limb Pain; Limb Swelling; Lupus (systemic lupus erythematosus); Mitral valve prolapse; Neck pain; Neurologic disorder; Nicotine dependence; Night sweats; Nipple discharge; Peripheral vascular disease; Presence of intrathecal pump; Pulmonary embolus; Renal calculus or stone; Sinus trouble; Skin breakdown; Ulcer         Past Surgical History  Back; Back surgery; Bladder Repair; Cholecystectomy; Colonoscopy; Direct revascularization heart muscle with catheter stent, prosthesis, or vein graft; Gallbladder; heart surgery; Hemorrhoidectomy; Hernia;  Hysterectomy; Hysterectomy-Abdominal; Lymph Node Excision; Pain Pump; Partial Hysterectomy; Port Placement; Spinal Fusion; Tonsillectomy; Tumor removal         Medication List  bacitracin 500 unit/gram topical ointment; Blood Glucose Test miscellaneous strip; Eucerin Plus Intensive Repair topical cream; furosemide 40 mg oral tablet; gabapentin 300 mg oral capsule; lancets miscellaneous misc; metformin 1,000 mg oral tablet extended release 24hr; Pain Pump-Dilaudid; Silvadene 1 % topical cream; Zyrtec 10 mg oral tablet         Allergy List  amoxicillin; aspirin; CEPHALOSPORINS; clarithromycin; Compazine; Egg; erythromycin; IVP Dye; NSAIDS; PENICILLINS; Phenergan; SULFA (SULFONAMIDES)         Family Medical History  Heart Disease; Cancer, Unspecified; Diabetes, unspecified type; Spine Problems; Heart Attack (MI); Renal Calculus; Family history of breast cancer; Renal Cancer; -Father's Family History Unknown; Bladder calculus; Blood Diseases; Osteoporosis; Family history of Arthritis         Reproductive History   0 Para 0 0 0 0 & Postmenopausal       Social History  Alcohol (Never); Alcohol Use (Never); Caffeine (Current - status unknown); Claustophobic (Unknown); Disabled; ; lives with children; Recreational Drug Use (Never); Second hand smoke exposure (Current some day); Tobacco (Current - status unknown)         Review of Systems  · Constitutional  o Denies  o : fever, chills, weight loss  · Cardiovascular  o Denies  o : chest pain, shortness of breath  · Gastrointestinal  o Denies  o : liver disease, heartburn, nausea, blood in stools  · Genitourinary  o Denies  o : painful urination, blood in urine  · Integument  o Denies  o : rash, itching  · Neurologic  o Denies  o : headache, weakness, loss of consciousness  · Musculoskeletal  o Admits  o : painful, swollen joints  · Psychiatric  o Denies  o : drug/alcohol addiction, anxiety, depression      Vitals  Date Time BP Position Site L\R Cuff Size HR RR  "TEMP (F) WT  HT  BMI kg/m2 BSA m2 O2 Sat HC       06/08/2020 01:04 PM         174lbs 0oz 5'  4\" 29.87 1.89           Physical Examination  · Constitutional  o Appearance  o : well developed, well-nourished, no obvious deformities present  · Head and Face  o Head  o :   § Inspection  § : normocephalic  o Face  o :   § Inspection  § : no facial lesions  · Eyes  o Conjunctivae  o : conjunctivae normal  o Sclerae  o : sclerae white  · Ears, Nose, Mouth and Throat  o Ears  o :   § External Ears  § : appearance within normal limits  § Hearing  § : intact  o Nose  o :   § External Nose  § : appearance normal  · Neck  o Inspection/Palpation  o : normal appearance  o Range of Motion  o : full range of motion  · Respiratory  o Respiratory Effort  o : breathing unlabored  o Inspection of Chest  o : normal appearance  o Auscultation of Lungs  o : no audible wheezing or rales  · Cardiovascular  o Heart  o : regular rate  · Gastrointestinal  o Abdominal Examination  o : soft and non-tender  · Skin and Subcutaneous Tissue  o General Inspection  o : intact, no rashes  · Psychiatric  o General  o : Alert and oriented x3  o Judgement and Insight  o : judgment and insight intact  o Mood and Affect  o : mood normal, affect appropriate  · Extremities  o Extremities  o : BILATERAL HIPS: No skin discoloration or atrophy. She is in wound care for bilateral lower leg diabetic ulcers. Patient has limited motion with hip flexion, internal and external rotation. X-rays show advanced osteoarthritis with left AVN present.   · In Office Procedures  o View  o : AP/LATERAL  o Site  o : bilateral, hip   o Indication  o : Bilateral Hip Pain  o Study  o : X-rays ordered, taken in the office, and reviewed today.  o Xray  o : X-rays show advanced osteoarthritis with left AVN present.   o Comparative Data  o : No comparative data found          Assessment  · Primary osteoarthritis of bilateral hip     715.15/M16.10  · Bilateral Pain: " Hip     719.45/M25.559  · Left AVN (avascular necrosis)     733.40/M87.00      Plan  · Orders  o Hip (Left) 2 or more views (includes AP Pelvis) [facility] ([id number]) - 719.45/M25.559 - 06/08/2020  o Hip (Right) 2 or more views (includes AP Pelvis) [facility]. ([id number]) - 719.45/M25.559 - 06/08/2020  · Medications  o Medications have been Reconciled  o Transition of Care or Provider Policy  · Instructions  o Reviewed the patient's Past Medical, Social, and Family history as well as the ROS at today's visit, no changes.  o Call or return if worsening symptoms.  o This note is transcribed by Kim felix/marzena  o We did discuss continue conservative treatment due to having a cardiac stent recently placed 6 months ago and uncontrolled diabetes.             Electronically Signed by: Kim Morocho, -Author on June 9, 2020 03:35:04 PM  Electronically Co-signed by: Sobeida Dave PA-C -Reviewer on June 9, 2020 03:49:05 PM  Electronically Co-signed by: Bull Martin MD -Reviewer on Tamia 10, 2020 11:19:08 AM

## 2021-05-12 NOTE — PROGRESS NOTES
"   Progress Note      Patient Name: Isha Llanes   Patient ID: [id number]   Sex: Female   YOB: 1965    Primary Care Provider: Damari Cornejo PA-C   Referring Provider: Damari Cornejo PA-C    Visit Date: April 13, 2020    Provider: LUZ Faulkner   Location: [facility]   Location Address: [address] [place] [address], [address]  [id number]   Location Phone: [phone]          Chief Complaint  · \"Cough and congestion\"      History Of Present Illness  Isha Llanes is a 54 year old /White female who presents for evaluation and treatment of:      Patient reports cough, congestion, sinus pressure x 1 month, states, \"I think I have a sinus infection\". Patient also with COPD, has been experiencing intermittent episodes of shortness of breath. Describes drainage as a brownish/yellow mucous. Denies productive cough. Was evaluated via Telemedicine by Cat Forte PA-C approximately 3 weeks ago. Patient was started on azithromycin and Bromfed DM, however states she did not finish the antibiotic due to diarrhea. Denies fever, shortness of breath, nausea, vomiting, headache. Drinking well. Decreased appetite. Patient has been treating with Bromfed DM, states this has improved symptoms. Also recently finished a short course of oral steroids for pain secondary to joint pain/SLE flare per Damari Cornejo PA-C. Patient requesting a refill on these today. She has been referred to rheumatology for further evaluation. Denies sick contacts. Denies known COVID19 exposure.       Past Medical History  Disease Name Date Onset Notes   Allergic rhinitis due to allergen 01/16/2020 Discussed OTC antihistamine for allergic sx prn.   Anticoagulation Medication Therapy --  --    Anxiety disorder 01/16/2020 Discussed anxiety and depression, pt declines SSRI, buspar, hydroxyzine. She states Xanax is only thing effective in past. discussed risks of benzo use including death, respiratory " depression. Discussed in detail with pt that we will not rx controlled medication for anxiety. She was given handout for local psychiatrist to call and schedule apt. to er if si/hi.    Arthritis 01/16/2020 Keep follow up with pain mgmt. Will refer to PT for stretches/strengthening. Discussed we will not rx pain meds from our office.   Asthma --  --    Back Pain  --  --    Breast lump --  --    Cancer --  --    CHF (congestive heart failure) 01/16/2020 Will request note from cardiology, new referral placed today. requesting previous pcp records as well as cardiology records   Chronic Obstructive Pulmonary Disease --  No current sx. Encouraged smoking cessation   Chronic pain 01/16/2020 Keep follow up with pain mgmt. We will request their records.    COPD (chronic obstructive pulmonary disease) 01/16/2020 Encouraged smoking cessation. requesting previous records.   Depression --  Discussed med options. Pt declined. Given handout to call psych.   Diabetes mellitus, type 2 01/16/2020 requesting recent labs. Pt has not been taking Januvia due to side effects. Will hold off starting new medication until we see recent a1c.   GERD (gastroesophageal reflux disease) --  Referred back to gi for updated EGD and colonoscopy.   Heart murmur 01/16/2020 --    History of Hodgkin's lymphoma 01/16/2020 Requesting previous record, will refer back to oncology since pt has not had recent follow up.    Kidney stones --  --    Knee pain 01/16/2020 Discussed knee pain, will refer to home health for PT. No meds since pt est with pain mgmt.   Limb Pain --  --    Limb Swelling --  --    Lupus (systemic lupus erythematosus) --  --    Mitral valve prolapse --  --    Neck pain --  --    Nicotine dependence 01/16/2020 encouraged smoking cessation, pt declined.    Night sweats --  --    Nipple discharge --  --    Peripheral vascular disease 01/16/2020 Discussed PVD noted on patient legs. Cont compression/compression socks. Will refer home health to  help with wound care of 2 small ulcers on back of L calf. Pt will rtc if sx worsen, fever, odor, exudate.    Presence of intrathecal pump 01/16/2020 Pt has pain pump    Pulmonary embolus --  --    Renal calculus or stone --  --    Sinus trouble --  Use otc antihistamine.   Skin breakdown 01/16/2020 Keep lesions clean and dry. We will send home health to do wound care on posterior calf lesions         Past Surgical History  Procedure Name Date Notes   Back --  --    Back surgery --  --    Bladder Repair --  Early 2000s   Cholecystectomy 2001 --    Direct revascularization heart muscle with catheter stent, prosthesis, or vein graft 2019 --    Gallbladder --  --    Hemorrhoidectomy --  --    Hernia --  --    Hysterectomy --  --    Hysterectomy-Abdominal 1996 --    Lymph Node Excision --  --    Pain Pump 2013, 2019 --    Partial Hysterectomy --  --    Port Placement --  --    Spinal Fusion 1998 --    Tumor removal --  between vaginal and rectal wall bladder-2000         Medication List  Name Date Started Instructions   diabetic supplies, miscellan. 01/16/2020 Glucometer and test strips to test bg 1x/day.   Eucerin Plus Intensive Repair topical cream 02/04/2020 apply to lower legs by topical route twice daily   furosemide 40 mg oral tablet 03/20/2020 TAKE ONE TABLET BY MOUTH ONCE daily   gabapentin 300 mg oral capsule  take 1 capsule (300 mg) by oral route 3 times per day   glimepiride 2 mg oral tablet  take 1 tablet (2 mg) by oral route once daily   lancets miscellaneous misc 01/16/2020 use as directed   methylprednisolone 4 mg oral tablets,dose pack  take as directed   mupirocin 2 % topical ointment 03/24/2020 apply a small amount to the affected area by topical route 3 times per day for 7 days   Pain Pump-Dilaudid  --    Voltaren 1 % topical gel 02/04/2020 apply to hands by topical route QID         Allergy List  Allergen Name Date Reaction Notes   amoxicillin --  SOA --    aspirin --  tinnitus --    CEPHALOSPORINS --  " lip swelling --    clarithromycin --  unknown --    Compazine --  unknown --    Egg --  --  --    erythromycin --  --  --    IVP Dye --  \"bad reaction\" --    NSAIDS --  unknown --    PENICILLINS --  unknown --    Phenergan --  unknown --    SULFA (SULFONAMIDES) --  unknown --          Family Medical History  Disease Name Relative/Age Notes   Cancer, Unspecified Sister/   --    Diabetes, unspecified type Mother/   Mother   Spine Problems Mother/   --    Heart Attack (MI) Mother/   --    Renal Calculus Mother/   Mother   Family history of breast cancer Mother/   Mother   Renal cancer Mother/   Mother  Mother/70s   -Father's Family History Unknown Father/   Father   Bladder calculus Grandmother (maternal)/  Mother/   Mother; Grandmother (maternal)         Reproductive History  Menstrual   Menopause Status: Postmenopausal HRT?: No   Pregnancy Summary   Total Pregnancies: 0 Full Term: 0 Premature: 0   Ab Induced: 0 Ab Spontaneous: 0 Ectopics: 0   Multiples: 0 Livin         Social History  Finding Status Start/Stop Quantity Notes   Alcohol Never --/-- --  drinks no   Caffeine Current - status unknown --/-- --  drinks coffee  drinks regularly; 3-4 times per day   Disabled --  --/-- --  --     --  --/-- --  two- one    Second hand smoke exposure Current some day --/-- --  yes   Tobacco Current every day 14/-- 2 PPD started smoking at age 89; smoked 2 cigarette(s) per day  started smoking at age 1; smoked 2 cigarette(s) per day         Review of Systems  · Constitutional  o Denies  o : fever, fatigue, weight loss, weight gain  · HENT  o Admits  o : sinus pain, nasal congestion, nasal discharge  o Denies  o : headaches, vertigo, lightheadedness, sore throat  · Cardiovascular  o Denies  o : lower extremity edema, chest pressure, palpitations  · Respiratory  o Admits  o : shortness of breath, cough  o Denies  o : wheezing, dyspnea on exertion  · Gastrointestinal  o Denies  o : nausea, vomiting, diarrhea, " "constipation, abdominal pain  · Musculoskeletal  o Admits  o : joint pain, knee pain  o Denies  o : limitation of motion  · Heme-Lymph  o Denies  o : petechiae, lymph node enlargement or tenderness      Vitals  Date Time BP Position Site L\R Cuff Size HR RR TEMP (F) WT  HT  BMI kg/m2 BSA m2 O2 Sat HC       02/04/2020 10:08 /60 Sitting    80 - R 16 98.3 167lbs 4oz 5'  4\" 28.71 1.85 95 %    02/17/2020 12:02 PM 96/64 Sitting    93 - R  97.8 163lbs 0oz    92 %    04/13/2020 02:19 /60 Sitting    90 - R 14 98.2 163lbs 0oz 5'  4\" 27.98 1.83 96 %          Physical Examination  · Constitutional  o Appearance  o : no acute distress, well-nourished  · Head and Face  o Head  o :   § Inspection  § : atraumatic, normocephalic  · Eyes  o Eyes  o : extraocular movements intact, no scleral icterus, no conjunctival injection  · Ears, Nose, Mouth and Throat  o Ears  o :   § External Ears  § : normal  § Otoscopic Examination  § : tympanic membrane appearance within normal limits bilaterally  o Nose  o :   § Intranasal Exam  § : nares patent  o Oral Cavity  o :   § Oral Mucosa  § : moist mucous membranes  o Throat  o :   § Oropharynx  § : no inflammation or lesions present, tonsils within normal limits  · Respiratory  o Respiratory Effort  o : breathing comfortably, symmetric chest rise  o Auscultation of Lungs  o : decreased breath sounds throughout; without wheezing or adventitious lung sounds  · Cardiovascular  o Heart  o :   § Auscultation of Heart  § : regular rate and rhythm, no murmurs, rubs, or gallops  o Peripheral Vascular System  o :   § Extremities  § : no edema  · Lymphatic  o Neck  o : no lymphadenopathy present  o Supraclavicular Nodes  o : no supraclavicular nodes  · Skin and Subcutaneous Tissue  o General Inspection  o : no lesions present, no areas of discoloration, skin turgor normal  · Neurologic  o Mental Status Examination  o :   § Orientation  § : grossly oriented to person, place and time  o Gait and " Station  o :   § Gait Screening  § : normal gait  · Psychiatric  o General  o : normal mood and affect          Results  · In-Office Procedures  o Lab procedure  § IOP - Influenza A/B Test (90980)   § Influenza A: Negative   § Influenza B: Negative   § Internal Control Verified?: Yes       Assessment  · Chronic pain     338.29/G89.29  Discussed with patient that pain medications or controlled substances will not be prescribed though the clinic and she should follow up with Pain Management as scheduled. Continue PT for stretching/strengthening exercises as previously ordered.   · COPD (chronic obstructive pulmonary disease)     496/J44.9  · Cough     786.2/R05  Decreased breath sounds throughout, without wheezing, O2 sat 96%, influenza negative. CXR in clinic without concern. Will treat as a COPD exacerbation and sinusitis with Levaquin due to patients significant allergy list, as patient states she is also unable to take doxycycline. Will refill Bromfed DM, patient aware of risks of increased blood pressure/blood sugar. Will monitor closely for elevations and discontinue with concerns. Encouraged patient to continue supportive care, including rest, increasing fluids, good hand hygiene, tylenol/motrin as needed for fever/comfort. Patient to continue to monitor and will call or return to clinic if symptoms worsen or persist. Provided patient with How to Self Monitor patient education sheet, which includes information on monitoring for fever, cough, shortness of breath, home quarantine when sick, avoiding exposure to other sick contacts, cough etiquette, hand hygiene, disinfecting surfaces, and avoiding touching eyes, nose and mouth.   · Sinusitis, acute     461.9/J01.90  · SLE (systemic lupus erythematosus)     710.0/M32.9  Discussed with patient the risks associated with frequent steroid use, she is to follow up with rheumatology as scheduled to discuss long term management of SLE.       Plan  · Orders  o ACO-39:  Current medications updated and reviewed () - - 04/13/2020  o Chest (AP PA and Lateral) 2 views X-Ray [facility] (14947) - 496/J44.9, 786.2/R05 - 04/13/2020   done in clinic  · Medications  o Bromfed DM 2-30-10 mg/5 mL oral syrup   SIG: take 10 milliliters by oral route every 4 hours as needed   DISP: (150) milliliters with 0 refills  Prescribed on 04/13/2020     o Levaquin 500 mg oral tablet   SIG: take 1 tablet (500 mg) by oral route once daily for 7 days   DISP: (7) tablets with 0 refills  Prescribed on 04/13/2020     o Medications have been Reconciled  o Transition of Care or Provider Policy  · Instructions  o Take all medications as prescribed/directed.  o Rest. Increase Fluids.  o Patient was educated/instructed on their diagnosis, treatment and medications prior to discharge from the clinic today.  o Patient instructed to seek medical attention urgently for new or worsening symptoms.  o Call the office with any concerns or questions.  o Risks, benefits, and alternatives were discussed with the patient. The patient is aware of risks associated with: long term/frequent steroid use  o Discussed Covid-19 precautions including, but not limited to, social distancing, avoid touching your face, and hand washing.   · Disposition  o Call or Return if symptoms worsen or persist.  o Prescriptions sent to pharmacy  o Radiograph performed in clinic            Electronically Signed by: LUZ Faulkner -Author on April 13, 2020 04:30:16 PM

## 2021-05-12 NOTE — PROGRESS NOTES
"   Progress Note      Patient Name: Isha Llanes   Patient ID: [id number]   Sex: Female   YOB: 1965    Primary Care Provider: Damari Cornejo PA-C   Referring Provider: Damari Cornejo PA-C    Visit Date: April 28, 2020    Provider: Nicolette Pena MD   Location: [facility]   Location Address: [address] [place] [address], [address]  [place], [place]  [id number]   Location Phone: [phone]          Chief Complaint  · \"i have had alot of leg swelling\"  · \"my legs are peeling\"      History Of Present Illness  Video Conferencing Visit  Isha Llanes is a 54 year old /White female who is presenting for evaluation via video conferencing through Zoom. Verbal consent obtained before beginning visit.   The following staff were present during this visit: Gabby Donaldson MA; Nicolette Pena MD   Isha Llanes is a 54 year old /White female who presents for evaluation and treatment of:      C/O increased leg swelling, weeping, flaking of skin, open areas not healing well.    States that she is wrapping her legs daily with ACE wraps. She is not using an emollient cream, states she never recieved this from the pharmacy. Has been using Mupirocin on open areas, does not think this helps much. She has not been keeping her legs elevated due to hip pain.   Home health is coming to her once per week to wrap legs.     She denies any redness of skin or fever.     She states that she has been taking her Lasix but thinks it makes her feel bad.     Requests refill of Bromfed, states that this helps with her nasal drainage.           Past Medical History  Disease Name Date Onset Notes   Allergic rhinitis due to allergen 01/16/2020 Discussed OTC antihistamine for allergic sx prn.   Anticoagulation Medication Therapy --  --    Anxiety disorder 01/16/2020 Discussed anxiety and depression, pt declines SSRI, buspar, hydroxyzine. She states Xanax is only thing effective in past. discussed " risks of benzo use including death, respiratory depression. Discussed in detail with pt that we will not rx controlled medication for anxiety. She was given handout for local psychiatrist to call and schedule apt. to er if si/hi.    Arthritis 01/16/2020 Keep follow up with pain mgmt. Will refer to PT for stretches/strengthening. Discussed we will not rx pain meds from our office.   Asthma --  --    Back Pain  --  --    Breast lump --  --    Cancer --  --    CHF (congestive heart failure) 01/16/2020 Will request note from cardiology, new referral placed today. requesting previous pcp records as well as cardiology records   Chronic Obstructive Pulmonary Disease --  No current sx. Encouraged smoking cessation   Chronic pain 01/16/2020 Keep follow up with pain mgmt. We will request their records.    COPD (chronic obstructive pulmonary disease) 01/16/2020 Encouraged smoking cessation. requesting previous records.   Depression --  Discussed med options. Pt declined. Given handout to call psych.   Diabetes mellitus, type 2 01/16/2020 requesting recent labs. Pt has not been taking Januvia due to side effects. Will hold off starting new medication until we see recent a1c.   GERD (gastroesophageal reflux disease) --  Referred back to gi for updated EGD and colonoscopy.   Heart murmur 01/16/2020 --    History of Hodgkin's lymphoma 01/16/2020 Requesting previous record, will refer back to oncology since pt has not had recent follow up.    Kidney stones --  --    Knee pain 01/16/2020 Discussed knee pain, will refer to home health for PT. No meds since pt est with pain mgmt.   Limb Pain --  --    Limb Swelling --  --    Lupus (systemic lupus erythematosus) --  --    Mitral valve prolapse --  --    Neck pain --  --    Nicotine dependence 01/16/2020 encouraged smoking cessation, pt declined.    Night sweats --  --    Nipple discharge --  --    Peripheral vascular disease 01/16/2020 Discussed PVD noted on patient legs. Cont  compression/compression socks. Will refer home health to help with wound care of 2 small ulcers on back of L calf. Pt will rtc if sx worsen, fever, odor, exudate.    Presence of intrathecal pump 01/16/2020 Pt has pain pump    Pulmonary embolus --  --    Renal calculus or stone --  --    Sinus trouble --  Use otc antihistamine.   Skin breakdown 01/16/2020 Keep lesions clean and dry. We will send home health to do wound care on posterior calf lesions         Past Surgical History  Procedure Name Date Notes   Back --  --    Back surgery --  --    Bladder Repair --  Early 2000s   Cholecystectomy 2001 --    Direct revascularization heart muscle with catheter stent, prosthesis, or vein graft 2019 --    Gallbladder --  --    Hemorrhoidectomy --  --    Hernia --  --    Hysterectomy --  --    Hysterectomy-Abdominal 1996 --    Lymph Node Excision --  --    Pain Pump 2013, 2019 --    Partial Hysterectomy --  --    Port Placement --  --    Spinal Fusion 1998 --    Tumor removal --  between vaginal and rectal wall bladder-2000         Medication List  Name Date Started Instructions   Bromfed DM 2-30-10 mg/5 mL oral syrup 04/13/2020 take 10 milliliters by oral route every 4 hours as needed   diabetic supplies, miscellan. 01/16/2020 Glucometer and test strips to test bg 1x/day.   Eucerin Plus Intensive Repair topical cream 02/04/2020 apply to lower legs by topical route twice daily   furosemide 40 mg oral tablet 03/20/2020 TAKE ONE TABLET BY MOUTH ONCE daily   gabapentin 300 mg oral capsule  take 1 capsule (300 mg) by oral route 3 times per day   glimepiride 2 mg oral tablet  take 1 tablet (2 mg) by oral route once daily   lancets miscellaneous misc 01/16/2020 use as directed   Levaquin 500 mg oral tablet 04/13/2020 take 1 tablet (500 mg) by oral route once daily for 7 days   methylprednisolone 4 mg oral tablets,dose pack  take as directed   mupirocin 2 % topical ointment 03/24/2020 apply a small amount to the affected area by  "topical route 3 times per day for 7 days   Pain Pump-Dilaudid  --    Voltaren 1 % topical gel 2020 apply to hands by topical route QID         Allergy List  Allergen Name Date Reaction Notes   amoxicillin --  SOA --    aspirin --  tinnitus --    CEPHALOSPORINS --  lip swelling --    clarithromycin --  unknown --    Compazine --  unknown --    Egg --  --  --    erythromycin --  --  --    IVP Dye --  \"bad reaction\" --    NSAIDS --  unknown --    PENICILLINS --  unknown --    Phenergan --  unknown --    SULFA (SULFONAMIDES) --  unknown --        Allergies Reconciled  Family Medical History  Disease Name Relative/Age Notes   Cancer, Unspecified Sister/   --    Diabetes, unspecified type Mother/   Mother   Spine Problems Mother/   --    Heart Attack (MI) Mother/   --    Renal Calculus Mother/   Mother   Family history of breast cancer Mother/   Mother   Renal cancer Mother/   Mother  Mother/70s   -Father's Family History Unknown Father/   Father   Bladder calculus Grandmother (maternal)/  Mother/   Mother; Grandmother (maternal)         Reproductive History  Menstrual   Menopause Status: Postmenopausal HRT?: No   Pregnancy Summary   Total Pregnancies: 0 Full Term: 0 Premature: 0   Ab Induced: 0 Ab Spontaneous: 0 Ectopics: 0   Multiples: 0 Livin         Social History  Finding Status Start/Stop Quantity Notes   Alcohol Never --/-- --  drinks no   Caffeine Current - status unknown --/-- --  drinks coffee  drinks regularly; 3-4 times per day   Disabled --  --/-- --  --     --  --/-- --  two- one    Second hand smoke exposure Current some day --/-- --  yes   Tobacco Current every day 14/-- 2 PPD started smoking at age 89; smoked 2 cigarette(s) per day  started smoking at age 1; smoked 2 cigarette(s) per day         Review of Systems  · Constitutional  o Denies  o : fever, fatigue, weight loss, weight gain  · HENT  o Admits  o : nasal congestion, postnasal drip  · Cardiovascular  o Admits  o : " lower extremity edema  o Denies  o : claudication, chest pressure, palpitations  · Respiratory  o Denies  o : shortness of breath, wheezing, cough, hemoptysis, dyspnea on exertion  · Gastrointestinal  o Denies  o : nausea, vomiting, diarrhea, constipation, abdominal pain  · Integument  o Admits  o : itching, skin dryness  · Musculoskeletal  o Admits  o : hip pain      Physical Examination     Gen: well-nourished, no acute distress  HENT: atraumatic, normocephalic  Eyes: extraocular movements intact, no scleral icterus  Lung: breathing comfortably, no cough  Skin: legs edematous, chronic venous stasis dermatitis evident, flaking of skin on lower legs, areas of skin tear on right leg, no sign of infection noted, no redness.   Neuro: grossly oriented to person, place, and time. no facial droop   Psych: normal mood and affect             Assessment  · Stasis dermatitis     454.1/I87.2  will send in bactiracin to use on open areas, no sign of infection at this time.   will send in Eucerin cream, encouraged to use regularly on lower legs to help condition skin  encouraged use of compression wrapping and elevation of legs when possible  · Nasal drainage     478.19/J00  discussed that Bromfed is not meant to be used as a long term medication  will send in antihistamine for potential allergy symptoms    Problems Reconciled  Plan  · Orders  o ACO-39: Current medications updated and reviewed () - - 04/28/2020  · Medications  o bacitracin 500 unit/gram topical ointment   SIG: apply to affected area by external route 2 times a day   DISP: (1) 28 gm tube with 0 refills  Prescribed on 04/28/2020     o Zyrtec 10 mg oral tablet   SIG: take 1 tablet (10 mg) by oral route once daily   DISP: (30) tablets with 0 refills  Prescribed on 04/28/2020     o Eucerin Plus Intensive Repair topical cream   SIG: apply to lower legs by topical route twice daily   DISP: (1) 113 gm jar with 0 refills  Refilled on 04/28/2020     o Medications  have been Reconciled  o Transition of Care or Provider Policy  · Instructions  o Take all medications as prescribed/directed.  o Patient was educated/instructed on their diagnosis, treatment and medications prior to discharge from the clinic today.  o Patient instructed to seek medical attention urgently for new or worsening symptoms.  o Call the office with any concerns or questions.  o Chronic conditions reviewed and taken into consideration for today's treatment plan.  · Disposition  o Call or Return if symptoms worsen or persist.  o follow up as needed  o Cornejo patient            Electronically Signed by: Nicolette Pena MD -Author on April 28, 2020 10:50:19 AM

## 2021-05-13 NOTE — PROGRESS NOTES
"   Progress Note      Patient Name: Isha Llanes   Patient ID: [id number]   Sex: Female   YOB: 1965    Primary Care Provider: Damari Cornejo PA-C   Referring Provider: Damari Cornejo PA-C    Visit Date: June 17, 2020    Provider: Cat Forte PA-C   Location: [facility]   Location Address: [address] [place] [address]  [place], [place]  [id number]   Location Phone: [phone]          Chief Complaint  · Pain in legs   · Swelling in eye      History Of Present Illness  Video Conferencing Visit  Isha Llanes is a 54 year old /White female who is presenting for evaluation via video conferencing via Zoom. Verbal consent obtained before beginning visit.   The following staff were present during this visit: Cat Forte PA-C   Isha Llanes is a 54 year old /White female who presents for evaluation and treatment of:      weakness in legs.  This is a chronic issue and patient currently is waiting to be seen by Ortho in [place].  She also is scheduled with pain management to get hip injections.  She is not interested in physical therapy as she states this has not helped her in the past.  She would be interested in massage therapy if the service is provided by home health.    sore on inside/outside of nose. It has been there for \"quite some time\".  It seems to be growing some towards her eye.  She has used abx ointment on it but is currently out.     eye drainage x several weeks.  She denies redness, blurred vision but states that throughout the day she has to wipe the \"gunk out of her eyes\".      rash in groin area.  Occasionally will itch and has been red.  She states that it is because she has to sit for long periods at a time.       Past Medical History  Disease Name Date Onset Notes   Allergic rhinitis due to allergen 01/16/2020 Discussed OTC antihistamine for allergic sx prn.   Anticoagulation Medication Therapy --  --    Anxiety disorder " 01/16/2020 Discussed anxiety and depression, pt declines SSRI, buspar, hydroxyzine. She states Xanax is only thing effective in past. discussed risks of benzo use including death, respiratory depression. Discussed in detail with pt that we will not rx controlled medication for anxiety. She was given handout for local psychiatrist to call and schedule apt. to er if si/hi.    Arthritis 1/16/2020 Keep follow up with pain mgmt. Will refer to PT for stretches/strengthening. Discussed we will not rx pain meds from our office.   Asthma --  --    Back Pain  --  --    Bladder Disorder --  --    Bowel Disease --  --    Breast lump --  --    Cancer --  --    CHF (congestive heart failure) 01/16/2020 Will request note from cardiology, new referral placed today. requesting previous pcp records as well as cardiology records   Chronic Obstructive Pulmonary Disease --  No current sx. Encouraged smoking cessation   Chronic pain 01/16/2020 Keep follow up with pain mgmt. We will request their records.    COPD (chronic obstructive pulmonary disease) 01/16/2020 Encouraged smoking cessation. requesting previous records.   Depression --  Discussed med options. Pt declined. Given handout to call psych.   Diabetes --  --    Diabetes mellitus, type 2 01/16/2020 requesting recent labs. Pt has not been taking Januvia due to side effects. Will hold off starting new medication until we see recent a1c.   GERD (gastroesophageal reflux disease) --  Referred back to gi for updated EGD and colonoscopy.   Heart Disease --  --    Heart murmur 01/16/2020 --    History of Hodgkin's lymphoma 01/16/2020 Requesting previous record, will refer back to oncology since pt has not had recent follow up.    Hyperlipemia --  --    Kidney Disease --  --    Kidney stones --  --    Knee pain 01/16/2020 Discussed knee pain, will refer to home health for PT. No meds since pt est with pain mgmt.   Limb Pain --  --    Limb Swelling --  --    Lupus (systemic lupus  erythematosus) --  --    Mitral valve prolapse --  --    Neck pain --  --    Neurologic disorder --  --    Nicotine dependence 01/16/2020 encouraged smoking cessation, pt declined.    Night sweats --  --    Nipple discharge --  --    Peripheral vascular disease 01/16/2020 Discussed PVD noted on patient legs. Cont compression/compression socks. Will refer home health to help with wound care of 2 small ulcers on back of L calf. Pt will rtc if sx worsen, fever, odor, exudate.    Presence of intrathecal pump 01/16/2020 Pt has pain pump    Pulmonary embolus --  --    Renal calculus or stone --  --    Sinus trouble --  Use otc antihistamine.   Skin breakdown 01/16/2020 Keep lesions clean and dry. We will send home health to do wound care on posterior calf lesions   Ulcer --  --          Past Surgical History  Procedure Name Date Notes   Back --  --    Back surgery --  --    Bladder Repair --  Early 2000s   Cholecystectomy 2001 --    Colonoscopy --  --    Direct revascularization heart muscle with catheter stent, prosthesis, or vein graft 2019 --    Gallbladder --  --    heart surgery --  --    Hemorrhoidectomy --  --    Hernia --  --    Hysterectomy --  --    Hysterectomy-Abdominal 1996 --    Lymph Node Excision --  --    Pain Pump 2013, 2019 --    Partial Hysterectomy --  --    Port Placement --  --    Spinal Fusion 1998 --    Tonsillectomy --  --    Tumor removal --  between vaginal and rectal wall bladder-2000         Medication List  Name Date Started Instructions   bacitracin 500 unit/gram topical ointment 06/17/2020 apply to affected area by external route 2 times a day   Blood Glucose Test miscellaneous strip 06/03/2020 use as directed to check blood sugar TID   Eucerin Plus Intensive Repair topical cream 04/28/2020 apply to lower legs by topical route twice daily   furosemide 40 mg oral tablet 05/20/2020 take 1 tablet (40 mg) by oral route once daily   gabapentin 300 mg oral capsule  take 1 capsule (300 mg) by  "oral route 3 times per day   Januvia 50 mg oral tablet 06/10/2020 take 1 tablet (50 mg) by oral route once daily   lancets miscellaneous misc 2020 use as directed   Pain Pump-Dilaudid  --    Zyrtec 10 mg oral tablet 2020 take 1 tablet (10 mg) by oral route once daily         Allergy List  Allergen Name Date Reaction Notes   amoxicillin --  SOA --    aspirin --  tinnitus --    CEPHALOSPORINS --  lip swelling --    clarithromycin --  unknown --    Compazine --  unknown --    Egg --  --  --    erythromycin --  --  --    IVP Dye --  \"bad reaction\" --    NSAIDS --  unknown --    PENICILLINS --  unknown --    Phenergan --  unknown --    SULFA (SULFONAMIDES) --  unknown --        Allergies Reconciled  Family Medical History  Disease Name Relative/Age Notes   Heart Disease Daughter/  Father/  Mother/  Sister/   Mother; Father; Sister; Daughter   Cancer, Unspecified Father/  Mother/  Sister/   Mother; Father; Sister   Diabetes, unspecified type Daughter/  Father/  Mother/  Sister/   Mother; Father; Sister; Daughter  Mother   Spine Problems Mother/   --    Heart Attack (MI) Mother/   --    Renal Calculus Mother/   Mother   Family history of breast cancer Mother/   Mother   Renal Cancer Mother/   Mother  Mother/70s   -Father's Family History Unknown Father/   Father   Bladder calculus Grandmother (maternal)/  Mother/   Mother; Grandmother (maternal)   Blood Diseases Father/   Father   Osteoporosis Mother/  Sister/   Mother; Sister   Family history of Arthritis Brother/  Daughter/  Mother/   Mother; Brother; Daughter         Reproductive History  Menstrual   Menopause Status: Postmenopausal HRT?: No   Pregnancy Summary   Total Pregnancies: 0 Full Term: 0 Premature: 0   Ab Induced: 0 Ab Spontaneous: 0 Ectopics: 0   Multiples: 0 Livin         Social History  Finding Status Start/Stop Quantity Notes   Alcohol Never --/-- --  drinks no   Alcohol Use Never --/-- --  does not drink   Caffeine Current - status " unknown --/-- --  drinks coffee  drinks regularly; 3-4 times per day   Claustophobic Unknown --/-- --  yes   Disabled --  --/-- --  --     --  --/-- --  two- one    lives with children --  --/-- --  --    Recreational Drug Use Never --/-- --  no   Second hand smoke exposure Current some day --/-- --  yes   Tobacco Current - status unknown --/-- --  smoked 31 or more years  started smoking at age 89; smoked 2 cigarette(s) per day  started smoking at age 1; smoked 2 cigarette(s) per day         Review of Systems  · Constitutional  o Denies  o : fever, fatigue, weight loss, weight gain  · Eyes  o Admits  o : discharge from eye  · Cardiovascular  o Denies  o : lower extremity edema, claudication, chest pressure, palpitations  · Respiratory  o Denies  o : shortness of breath, wheezing, cough, hemoptysis, dyspnea on exertion  · Gastrointestinal  o Denies  o : nausea, vomiting, diarrhea, constipation, abdominal pain  · Integument  o Admits  o : rash      Physical Examination     Gen: well-nourished, no acute distress  HENT: atraumatic, normocephalic  Eyes: extraocular movements intact, no scleral icterus  Lung: breathing comfortably, no cough  Skin: no visible rash, no lesions  Neuro: grossly oriented to person, place, and time. no facial droop   Psych: normal mood and affect    patient smoking cigarette during Zoom visit               Assessment  · Osteoarthritis     715.90/M19.90  Follow up with pain management and Ortho as scheduled.  · Skin lesion     709.9/L98.9  Will refill bacitracin and send patient to Derm for further evaluation.  · Conjunctivitis     372.30/H10.9  Will send in abx eye drops.  · Candidiasis     112.9/B37.9  Rash in groin likely due to yeast, will send in Nystatin.    Problems Reconciled  Plan  · Orders  o ACO-39: Current medications updated and reviewed () - - 2020  o DERMATOLOGY CONSULTATION ([facility]) - 709.9/L98.9 - 2020  · Medications  o Polytrim 10,000 unit- 1  mg/mL ophthalmic (eye) drops   SIG: instill 1 drop into affected eye(s) by ophthalmic route every 6 hours for 7 days   DISP: (1) 10 ml drop btl with 0 refills  Prescribed on 06/17/2020     o nystatin 100,000 unit/gram topical powder   SIG: apply to the affected area(s) by topical route 2 times per day for 30 days   DISP: (1) 15 gm bottle with 0 refills  Prescribed on 06/17/2020     o bacitracin 500 unit/gram topical ointment   SIG: apply to affected area by external route 2 times a day   DISP: (1) 28 gm tube with 0 refills  Refilled on 06/17/2020     o Medications have been Reconciled  o Transition of Care or Provider Policy  · Instructions  o Take all medications as prescribed/directed.  o Patient was educated/instructed on their diagnosis, treatment and medications prior to discharge from the clinic today.  o Patient instructed to seek medical attention urgently for new or worsening symptoms.  o Call the office with any concerns or questions.  · Disposition  o Call or Return if symptoms worsen or persist.  o follow up as needed  o Medications sent electronically to pharmacy            Electronically Signed by: Cat Forte PA-C -Author on June 17, 2020 01:09:59 PM

## 2021-05-13 NOTE — PROGRESS NOTES
Progress Note      Patient Name: Isha Llanes   Patient ID: [id number]   Sex: Female   YOB: 1965    Primary Care Provider: Donato Cornejo PA-C   Referring Provider: Donato Cornejo PA-C    Visit Date: July 6, 2020    Provider: Donato Cornejo PA-C   Location: [facility]   Location Address: [address] [place] [address], [address]  [place], [place]  [id number]   Location Phone: [phone]          Chief Complaint  · Pain in legs   · Leg swelling   · Wounds on legs       History Of Present Illness  Video Conferencing Visit  Isha Llanes is a 54 year old /White female who is presenting for evaluation via video conferencing via Zoom. Verbal consent obtained before beginning visit.   The following staff were present during this visit: Donato Cornejo PA-C   Isha Llanes is a 54 year old /White female who presents for evaluation and treatment of:      Leg swelling and discharge.  She was out of water pill 2 days over the weekend.   She states yesterday the lower legs started draining yellow/clear drainage. Denies odor. Denies warmth to lesions. She has redness of lower legs, this is chronic, no worsening.  She has fluids coming off the top of feet from lesions draining as well.   Denies fever.  She has been using the cream from wound care.  she is not active due to chronic hip pain.    DM: She states alogliptin caused chest pain and diarrhea.   She states bg running 178.  She refuses to take PO medications from our office. She is waiting to see endocrine. She has not heard back from their office.    Nicotine: she refuses to quit smoking       Past Medical History  Disease Name Date Onset Notes   Allergic rhinitis due to allergen 01/16/2020 Discussed OTC antihistamine for allergic sx prn.   Anticoagulation Medication Therapy --  --    Anxiety disorder 01/16/2020 Discussed anxiety and depression, pt declines SSRI, buspar, hydroxyzine. She states Xanax is only thing  effective in past. discussed risks of benzo use including death, respiratory depression. Discussed in detail with pt that we will not rx controlled medication for anxiety. She was given handout for local psychiatrist to call and schedule apt. to er if si/hi.    Arthritis 1/16/2020 Keep follow up with pain mgmt. Will refer to PT for stretches/strengthening. Discussed we will not rx pain meds from our office.   Asthma --  --    Back Pain  --  --    Bladder Disorder --  --    Bowel Disease --  --    Breast lump --  --    Cancer --  --    CHF (congestive heart failure) 01/16/2020 Will request note from cardiology, new referral placed today. requesting previous pcp records as well as cardiology records   Chronic Obstructive Pulmonary Disease --  No current sx. Encouraged smoking cessation   Chronic pain 01/16/2020 Keep follow up with pain mgmt. We will request their records.    COPD (chronic obstructive pulmonary disease) 01/16/2020 Encouraged smoking cessation. requesting previous records.   Depression --  Discussed med options. Pt declined. Given handout to call psych.   Diabetes --  --    Diabetes mellitus, type 2 01/16/2020 requesting recent labs. Pt has not been taking Januvia due to side effects. Will hold off starting new medication until we see recent a1c.   GERD (gastroesophageal reflux disease) --  Referred back to gi for updated EGD and colonoscopy.   Heart Disease --  --    Heart murmur 01/16/2020 --    History of Hodgkin's lymphoma 01/16/2020 Requesting previous record, will refer back to oncology since pt has not had recent follow up.    Hyperlipemia --  --    Kidney Disease --  --    Kidney stones --  --    Knee pain 01/16/2020 Discussed knee pain, will refer to home health for PT. No meds since pt est with pain mgmt.   Limb Pain --  --    Limb Swelling --  --    Lupus (systemic lupus erythematosus) --  --    Mitral valve prolapse --  --    Neck pain --  --    Neurologic disorder --  --    Nicotine  dependence 01/16/2020 encouraged smoking cessation, pt declined.    Night sweats --  --    Nipple discharge --  --    Peripheral vascular disease 01/16/2020 Discussed PVD noted on patient legs. Cont compression/compression socks. Will refer home health to help with wound care of 2 small ulcers on back of L calf. Pt will rtc if sx worsen, fever, odor, exudate.    Presence of intrathecal pump 01/16/2020 Pt has pain pump    Pulmonary embolus --  --    Renal calculus or stone --  --    Sinus trouble --  Use otc antihistamine.   Skin breakdown 01/16/2020 Keep lesions clean and dry. We will send home health to do wound care on posterior calf lesions   Ulcer --  --          Past Surgical History  Procedure Name Date Notes   Back --  --    Back surgery --  --    Bladder Repair --  Early 2000s   Cholecystectomy 2001 --    Colonoscopy --  --    Direct revascularization heart muscle with catheter stent, prosthesis, or vein graft 2019 --    Gallbladder --  --    heart surgery --  --    Hemorrhoidectomy --  --    Hernia --  --    Hysterectomy --  --    Hysterectomy-Abdominal 1996 --    Lymph Node Excision --  --    Pain Pump 2013, 2019 --    Partial Hysterectomy --  --    Port Placement --  --    Spinal Fusion 1998 --    Tonsillectomy --  --    Tumor removal --  between vaginal and rectal wall bladder-2000         Medication List  Name Date Started Instructions   alogliptin 12.5 mg oral tablet 06/18/2020 take 1 tablet by oral route daily   bacitracin 500 unit/gram topical ointment 06/17/2020 apply to affected area by external route 2 times a day   Blood Glucose Test miscellaneous strip 06/03/2020 use as directed to check blood sugar TID   Eucerin Plus Intensive Repair topical cream 04/28/2020 apply to lower legs by topical route twice daily   furosemide 40 mg oral tablet 05/20/2020 take 1 tablet (40 mg) by oral route once daily   gabapentin 300 mg oral capsule  take 1 capsule (300 mg) by oral route 3 times per day   lancets  "miscellaneous misc 2020 use as directed   nystatin 100,000 unit/gram topical powder 2020 apply to the affected area(s) by topical route 2 times per day for 30 days   Pain Pump-Dilaudid  --          Allergy List  Allergen Name Date Reaction Notes   amoxicillin --  SOA --    aspirin --  tinnitus --    CEPHALOSPORINS --  lip swelling --    clarithromycin --  unknown --    Compazine --  unknown --    Egg --  --  --    erythromycin --  --  --    IVP Dye --  \"bad reaction\" --    NSAIDS --  unknown --    PENICILLINS --  unknown --    Phenergan --  unknown --    SULFA (SULFONAMIDES) --  unknown --        Allergies Reconciled  Family Medical History  Disease Name Relative/Age Notes   Heart Disease Daughter/  Father/  Mother/  Sister/   Mother; Father; Sister; Daughter   Cancer, Unspecified Father/  Mother/  Sister/   Mother; Father; Sister   Diabetes, unspecified type Daughter/  Father/  Mother/  Sister/   Mother; Father; Sister; Daughter  Mother   Spine Problems Mother/   --    Heart Attack (MI) Mother/   --    Renal Calculus Mother/   Mother   Family history of breast cancer Mother/   Mother   Renal Cancer Mother/   Mother  Mother/70s   -Father's Family History Unknown Father/   Father   Bladder calculus Grandmother (maternal)/  Mother/   Mother; Grandmother (maternal)   Blood Diseases Father/   Father   Osteoporosis Mother/  Sister/   Mother; Sister   Family history of Arthritis Brother/  Daughter/  Mother/   Mother; Brother; Daughter         Reproductive History  Menstrual   Menopause Status: Postmenopausal HRT?: No   Pregnancy Summary   Total Pregnancies: 0 Full Term: 0 Premature: 0   Ab Induced: 0 Ab Spontaneous: 0 Ectopics: 0   Multiples: 0 Livin         Social History  Finding Status Start/Stop Quantity Notes   Alcohol Never --/-- --  drinks no   Alcohol Use Never --/-- --  does not drink   Caffeine Current - status unknown --/-- --  drinks coffee  drinks regularly; 3-4 times per day "   Claustophobic Unknown --/-- --  yes   Disabled --  --/-- --  --     --  --/-- --  two- one    lives with children --  --/-- --  --    Recreational Drug Use Never --/-- --  no   Second hand smoke exposure Current some day --/-- --  yes   Tobacco Current - status unknown --/-- --  smoked 31 or more years  started smoking at age 89; smoked 2 cigarette(s) per day  started smoking at age 1; smoked 2 cigarette(s) per day         Review of Systems  · Constitutional  o Denies  o : fever, fatigue, weight loss, weight gain  · Cardiovascular  o Admits  o : lower extremity edema  o Denies  o : claudication, chest pressure, palpitations  · Respiratory  o Denies  o : shortness of breath, wheezing, cough, hemoptysis, dyspnea on exertion  · Gastrointestinal  o Denies  o : nausea, vomiting, diarrhea, constipation, abdominal pain  · Integument  o Admits  o : rash, new skin lesions      Physical Examination     Gen: well-nourished, no acute distress  HENT: atraumatic, normocephalic  Eyes: extraocular movements intact, no scleral icterus  Lung: breathing comfortably, no cough  Skin: erythema on bilateral lower legs, open lesions noted bilateral lower legs and top of feet. Drainage appears clear in nature.  CVD: mild edema noted bilateral LE, appears to be similar to pt baseline normal  Neuro: grossly oriented to person, place, and time. no facial droop   Psych: normal mood and affect               Assessment  · Diabetes mellitus, type 2     250.00/E11.9  Given endo phone number today to call and schedule apt. Discussed other med options but pt declined due to side effects. Educated pt that elevated bg will worsen CVD which is affecting swelling in lower legs. Pt states understanding but would like to see endocrine first.  · Nicotine dependence     305.1/F17.200  discussed smoking cessation would help swelling and wound healing. Pt declined.  · Limb  Swelling     729.81/M79.89  · Noncompliance     V15.81/Z91.19  Discussed medication non compliance and concern for long term effects from uncontrolled dm. Discussed complications of uncontrolled dm including mi, stroke, amputation, death, CKD, blindness, non-healing wounds, neuropathy, infections.  · Stasis dermatitis     454.1/I87.2  discussed LE edema. encouraged to cont compression for venous stasis and not skip medication doses. Pt legs do not appear infected but discussed limited ability to eval due to tele health exam. Pt will rtc or to er if sx worsen, fever, warmth to legs. Pt understands and agrees.    Problems Reconciled  Plan  · Orders  o Smoking cessation counseling, 3-10 minutes [facility] ([id number]) - 305.1/F17.200 - 07/06/2020  o ACO-17: Screened for tobacco use AND received tobacco cessation intervention (4004F) - 305.1/F17.200 - 07/06/2020  o ACO-39: Current medications updated and reviewed () - - 07/06/2020  · Medications  o alogliptin 12.5 mg oral tablet   SIG: take 1 tablet by oral route daily   DISP: (90) tablets with 0 refills  Discontinued on 07/06/2020     o nystatin 100,000 unit/gram topical powder   SIG: apply to the affected area(s) by topical route 2 times per day for 30 days   DISP: (1) 15 gm bottle with 0 refills  Discontinued on 07/06/2020     o Medications have been Reconciled  o Transition of Care or Provider Policy  · Instructions  o *Form of nicotine being used: cigarette  o Patient was strongly encouraged to discontinue use of any nicotine containing product or minimize the use of the product.  o Patient was educated/instructed on their diagnosis, treatment and medications prior to discharge from the clinic today.  · Disposition  o Call or Return if symptoms worsen or persist.  o Keep follow up appt as scheduled            Electronically Signed by: Damari Cornejo PA-C -Author on July 6, 2020 02:37:20 PM

## 2021-05-13 NOTE — PROGRESS NOTES
Quick Note      Patient Name: Isha Llanes   Patient ID: [id number]   Sex: Female   YOB: 1965    Primary Care Provider: Damari Cornejo PA-C   Referring Provider: Damari Cornejo PA-C    Visit Date: May 21, 2020    Provider: Damari Cornejo PA-C   Location: [facility]   Location Address: [address] [place] [address], [address]  [place], [place]  [id number]   Location Phone: [phone]          History Of Present Illness  TELEHEALTH TELEPHONE VISIT  Chief Complaint: leg   Isha Llanes is a 54 year old /White female who is presenting for evaluation via telehealth telephone visit. Verbal consent obtained before beginning visit.   Provider spent 11 minutes with the patient during telehealth visit.   The following staff were present during this visit: Damari Cornejo PA-C   Past Medical History/Overview of Patient Symptoms     3:56-4:07  Follow up on legs. She states legs are worse.   She is having swelling in bilateral legs, white around ankles, leaking fluids.   Denies warmth or fever to legs.   she has not tried new cream from home health, she got it yesterday.  She is wrapping legs at home.      BP: she was told bp was elevated by home health nurse. 148/90.    DM: she has been watching bg 140-160s. Denies low bg.      She had a dizziness episode yesterday. [place] like she drove over a big hill, stomach dropped.  She denies associated cp, palpitations, vision changes.  Denies nausea or vomiting.           Assessment  · Anxiety disorder     300.00/F41.9  Discussed anxiety and depression, pt declines SSRI, buspar, hydroxyzine. She states Xanax is only thing effective in past. discussed risks of benzo use including death, respiratory depression. Discussed in detail with pt that we will not rx controlled medication for anxiety. She was given handout for local psychiatrist to call and schedule apt. to er if si/hi.   · CHF (congestive heart failure)     428.0/I50.9  Will request note  from cardiology, new referral placed today.  requesting previous pcp records as well as cardiology records  · Chronic Obstructive Pulmonary Disease     491.21  No current sx. Encouraged smoking cessation  · Chronic pain     338.29/G89.29  Keep follow up with pain mgmt. We will request their records.  · Diabetes mellitus, type 2     250.00/E11.9  Discussed need to check labs at her follow up visit since we never received previous lab work.  · History of Hodgkin's lymphoma     V10.72/Z85.71  Requesting previous record, will refer back to oncology since pt has not had recent follow up.  · Nicotine dependence     305.1/F17.200  encouraged smoking cessation, pt declined.  · Peripheral vascular disease     443.9/I73.9  Discussed PVD noted on patient legs. Cont compression/compression socks. Will refer home health to help with wound care of 2 small ulcers on back of L calf. Pt will rtc if sx worsen, fever, odor, exudate.  · Limb Swelling     729.81/M79.89  Discussed limited ability to eval pt since she cannot do video telehealth or come into office. Pt will come in office next wk to have a full evaluation. Encouraged to try the new cream rx by home health. to er if she feels sx are worse, fever, increased swelling or redness. Pt understands and agrees  · Blood pressure elevated without history of HTN     796.2/R03.0  BP elevated per pt, will check bp in office. all bp reading previously have been wnl here so I am hesitant to start medication. low salt diet. Encouraged pt to cont to be as active as possible. She is coming into office next wk for full eval. to er if cp, palpitations, ha, slurred speech.    Problems Reconciled  Plan  · Orders  o Physician Telephone Evaluation, 11-20 minutes (31221) - - 05/22/2020  · Medications  o Medications have been Reconciled  o Transition of Care or Provider Policy  · Instructions  o Plan Of Care:   o Chronic conditions reviewed and taken into consideration for today's treatment  plan.  o Patient instructed to seek medical attention urgently for new or worsening symptoms.  o Patient was educated/instructed on their diagnosis, treatment and medications prior to discharge from the clinic today.  · Disposition  o Call or Return if symptoms worsen or persist.  o Follow up in 1 week            Electronically Signed by: Damari Cornejo PA-C -Author on May 22, 2020 09:03:07 AM  Electronically Co-signed by: Nicolette Pena MD -Reviewer on May 22, 2020 09:24:34 AM

## 2021-05-13 NOTE — PROGRESS NOTES
Progress Note      Patient Name: Isha Llanes   Patient ID: [id number]   Sex: Female   YOB: 1965    Primary Care Provider: Damari Cornejo PA-C   Referring Provider: Damari Cornejo PA-C    Visit Date: Tamia 10, 2020    Provider: Damari Cornejo PA-C   Location: [facility]   Location Address: [address] [place] [address], [address]  [place], [place]  [id number]   Location Phone: [phone]          Chief Complaint  · hip pain  · follow up   · Diabetes       History Of Present Illness  Video Conferencing Visit  Isha Llanes is a 54 year old /White female who is presenting for evaluation via video conferencing via Zoom. Verbal consent obtained before beginning visit.   The following staff were present during this visit: Damari Cornejo PA-C      trouble sleeping and Anxiety- still does not want to take any medications for depression  Benzo worked in the past. wants to see counselor but has not received phone numbers in the mail yet  Denies si/hi.    Pt was seen by ortho and was told that she had severe arthritis. She is being referred to [facility] in [place] for eval.   She states she is going to have pain dr refer her to ortho as well.   She has severe hip pain.   hip pain- wants us to refill Gabapentin until she see pain management.    She was seen by rheumatology. She was told to keep follow up with pain mgmt.  She states she was given a prednisone pack to take.     DM: she states bg running 200s at home  She refuses metformin. She states she will take Januvia.   unable to get metformin XR filled. Ins. pays for immediate release only and she cannot tolerate it due to GI issues. Glucose now 163.    GERD: has had recent increase in reflux sx.   she takes tums which helps. She would like rx.       Past Medical History  Disease Name Date Onset Notes   Allergic rhinitis due to allergen 01/16/2020 Discussed OTC antihistamine for allergic sx prn.   Anticoagulation Medication  Therapy --  --    Anxiety disorder 01/16/2020 Discussed anxiety and depression, pt declines SSRI, buspar, hydroxyzine. She states Xanax is only thing effective in past. discussed risks of benzo use including death, respiratory depression. Discussed in detail with pt that we will not rx controlled medication for anxiety. She was given handout for local psychiatrist to call and schedule apt. to er if si/hi.    Arthritis 1/16/2020 Keep follow up with pain mgmt. Will refer to PT for stretches/strengthening. Discussed we will not rx pain meds from our office.   Asthma --  --    Back Pain  --  --    Bladder Disorder --  --    Bowel Disease --  --    Breast lump --  --    Cancer --  --    CHF (congestive heart failure) 01/16/2020 Will request note from cardiology, new referral placed today. requesting previous pcp records as well as cardiology records   Chronic Obstructive Pulmonary Disease --  No current sx. Encouraged smoking cessation   Chronic pain 01/16/2020 Keep follow up with pain mgmt. We will request their records.    COPD (chronic obstructive pulmonary disease) 01/16/2020 Encouraged smoking cessation. requesting previous records.   Depression --  Discussed med options. Pt declined. Given handout to call psych.   Diabetes --  --    Diabetes mellitus, type 2 01/16/2020 requesting recent labs. Pt has not been taking Januvia due to side effects. Will hold off starting new medication until we see recent a1c.   GERD (gastroesophageal reflux disease) --  Referred back to gi for updated EGD and colonoscopy.   Heart Disease --  --    Heart murmur 01/16/2020 --    History of Hodgkin's lymphoma 01/16/2020 Requesting previous record, will refer back to oncology since pt has not had recent follow up.    Hyperlipemia --  --    Kidney Disease --  --    Kidney stones --  --    Knee pain 01/16/2020 Discussed knee pain, will refer to home health for PT. No meds since pt est with pain mgmt.   Limb Pain --  --    Limb Swelling --   --    Lupus (systemic lupus erythematosus) --  --    Mitral valve prolapse --  --    Neck pain --  --    Neurologic disorder --  --    Nicotine dependence 01/16/2020 encouraged smoking cessation, pt declined.    Night sweats --  --    Nipple discharge --  --    Peripheral vascular disease 01/16/2020 Discussed PVD noted on patient legs. Cont compression/compression socks. Will refer home health to help with wound care of 2 small ulcers on back of L calf. Pt will rtc if sx worsen, fever, odor, exudate.    Presence of intrathecal pump 01/16/2020 Pt has pain pump    Pulmonary embolus --  --    Renal calculus or stone --  --    Sinus trouble --  Use otc antihistamine.   Skin breakdown 01/16/2020 Keep lesions clean and dry. We will send home health to do wound care on posterior calf lesions   Ulcer --  --          Past Surgical History  Procedure Name Date Notes   Back --  --    Back surgery --  --    Bladder Repair --  Early 2000s   Cholecystectomy 2001 --    Colonoscopy --  --    Direct revascularization heart muscle with catheter stent, prosthesis, or vein graft 2019 --    Gallbladder --  --    heart surgery --  --    Hemorrhoidectomy --  --    Hernia --  --    Hysterectomy --  --    Hysterectomy-Abdominal 1996 --    Lymph Node Excision --  --    Pain Pump 2013, 2019 --    Partial Hysterectomy --  --    Port Placement --  --    Spinal Fusion 1998 --    Tonsillectomy --  --    Tumor removal --  between vaginal and rectal wall bladder-2000         Medication List  Name Date Started Instructions   bacitracin 500 unit/gram topical ointment 04/28/2020 apply to affected area by external route 2 times a day   Blood Glucose Test miscellaneous strip 06/03/2020 use as directed to check blood sugar TID   Eucerin Plus Intensive Repair topical cream 04/28/2020 apply to lower legs by topical route twice daily   furosemide 40 mg oral tablet 05/20/2020 take 1 tablet (40 mg) by oral route once daily   gabapentin 300 mg oral capsule   "take 1 capsule (300 mg) by oral route 3 times per day   lancets miscellaneous misc 2020 use as directed   Pain Pump-Dilaudid  --    Zyrtec 10 mg oral tablet 2020 take 1 tablet (10 mg) by oral route once daily         Allergy List  Allergen Name Date Reaction Notes   amoxicillin --  SOA --    aspirin --  tinnitus --    CEPHALOSPORINS --  lip swelling --    clarithromycin --  unknown --    Compazine --  unknown --    Egg --  --  --    erythromycin --  --  --    IVP Dye --  \"bad reaction\" --    NSAIDS --  unknown --    PENICILLINS --  unknown --    Phenergan --  unknown --    SULFA (SULFONAMIDES) --  unknown --        Allergies Reconciled  Family Medical History  Disease Name Relative/Age Notes   Heart Disease Daughter/  Father/  Mother/  Sister/   Mother; Father; Sister; Daughter   Cancer, Unspecified Father/  Mother/  Sister/   Mother; Father; Sister   Diabetes, unspecified type Daughter/  Father/  Mother/  Sister/   Mother; Father; Sister; Daughter  Mother   Spine Problems Mother/   --    Heart Attack (MI) Mother/   --    Renal Calculus Mother/   Mother   Family history of breast cancer Mother/   Mother   Renal Cancer Mother/   Mother  Mother/70s   -Father's Family History Unknown Father/   Father   Bladder calculus Grandmother (maternal)/  Mother/   Mother; Grandmother (maternal)   Blood Diseases Father/   Father   Osteoporosis Mother/  Sister/   Mother; Sister   Family history of Arthritis Brother/  Daughter/  Mother/   Mother; Brother; Daughter         Reproductive History  Menstrual   Menopause Status: Postmenopausal HRT?: No   Pregnancy Summary   Total Pregnancies: 0 Full Term: 0 Premature: 0   Ab Induced: 0 Ab Spontaneous: 0 Ectopics: 0   Multiples: 0 Livin         Social History  Finding Status Start/Stop Quantity Notes   Alcohol Never --/-- --  drinks no   Alcohol Use Never --/-- --  does not drink   Caffeine Current - status unknown --/-- --  drinks coffee  drinks regularly; 3-4 times per " day   Claustophobic Unknown --/-- --  yes   Disabled --  --/-- --  --     --  --/-- --  two- one    lives with children --  --/-- --  --    Recreational Drug Use Never --/-- --  no   Second hand smoke exposure Current some day --/-- --  yes   Tobacco Current - status unknown --/-- --  smoked 31 or more years  started smoking at age 89; smoked 2 cigarette(s) per day  started smoking at age 1; smoked 2 cigarette(s) per day         Review of Systems  · Constitutional  o Denies  o : fever, fatigue, weight loss, weight gain  · Cardiovascular  o Denies  o : lower extremity edema, claudication, chest pressure, palpitations  · Respiratory  o Denies  o : shortness of breath, wheezing, frequent cough, hemoptysis, dyspnea on exertion  · Gastrointestinal  o Denies  o : nausea, vomiting, diarrhea, constipation, abdominal pain  · Musculoskeletal  o Admits  o : joint pain, hip pain  · Psychiatric  o Admits  o : anxiety, difficulty sleeping  o Denies  o : depression, mood changes, memory changes, SI/HI      Physical Examination     Gen: well-nourished, no acute distress  HENT: atraumatic, normocephalic  Eyes: extraocular movements intact, no scleral icterus  Lung: breathing comfortably, no cough  Skin: no visible rash, no lesions  Neuro: grossly oriented to person, place, and time. no facial droop   Psych: normal mood and affect             Assessment  · Anxiety disorder     300.00/F41.9  Discussed anxiety and depression, pt declines SSRI, buspar, hydroxyzine. She states Xanax is only thing effective in past. discussed risks of benzo use including death, respiratory depression. Discussed in detail with pt that we will not rx controlled medication for anxiety. She was mailed a handout for local psychiatrist to call and schedule apt. to er if si/hi.   · Arthritis     716.90/M19.90  Keep follow up with pain mgmt. Will refer to PT for stretches/strengthening. Discussed we will not rx pain meds from our  office.  · Chronic pain     338.29/G89.29  Keep follow up with pain mgmt. We will request their records.  · Diabetes mellitus, type 2     250.00/E11.9  Discussed complications of uncontrolled dm including mi, stroke, amputation, death, CKD, blindness, non-healing wounds, neuropathy, infections. Discussed that pt needs to restart medication, she agreed to try Januvia. Will cont to monitor bg at home with am bg goal <120.  · Hip pain     719.45/M25.559  encouraged to follow up with ortho referral to [place] and with pain mgmt      Plan  · Orders  o ACO-39: Current medications updated and reviewed () - - 06/10/2020  · Medications  o famotidine 20 mg oral tablet   SIG: take 1 tablet (20 mg) by oral route 2 times per day   DISP: (60) tablets with 3 refills  Prescribed on 06/10/2020     o Januvia 50 mg oral tablet   SIG: take 1 tablet (50 mg) by oral route once daily   DISP: (30) tablets with 2 refills  Prescribed on 06/10/2020     o metformin 1,000 mg oral tablet extended release 24hr   SIG: take 1 tablet (1,000 mg) by oral route once daily with the evening meal   DISP: (30) tablets with 2 refills  Discontinued on 06/10/2020     o Silvadene 1 % topical cream   SIG: APPLY TO AFFECTED AREA BID   DISP: (1) Container with 0 refills  Discontinued on 06/10/2020     o Medications have been Reconciled  o Transition of Care or Provider Policy  · Instructions  o Handouts were given to patient: local counselors/psychiatrist   o Patient was educated/instructed on their diagnosis, treatment and medications prior to discharge from the clinic today.  o Electronically Identified Patient Education Materials Provided Electronically  · Disposition  o Call or Return if symptoms worsen or persist.  o Keep follow up appt as scheduled            Electronically Signed by: Damari Cornejo PA-C -Author on June 11, 2020 08:06:47 AM

## 2021-05-13 NOTE — PROGRESS NOTES
"   Progress Note      Patient Name: Isha Llanes   Patient ID: [id number]   Sex: Female   YOB: 1965    Primary Care Provider: Damari Cornejo PA-C   Referring Provider: Damari Cornejo PA-C    Visit Date: May 28, 2020    Provider: Damari Cornejo PA-C   Location: [facility]   Location Address: [address] [place] [address], [address]  [place], [place]  [id number]   Location Phone: [phone]          Chief Complaint  · \"Having problems with my legs, lotion she gave me is not working\"      History Of Present Illness  Isha Llanes is a 54 year old /White female who presents for evaluation and treatment of:      leg swelling and drainage.   She is using a new cream from home health. They are coming to her house 1x/wk and dressing her legs  Legs swelling comes and goes, worse at times  She has different areas on legs that break open and ooze clear liquid at times.   she is worried she has MRSA because infection in legs has happened in the past.   denies fever, odor to discharge.  She would like to see dermatology.    She is not able to walk around due to severe hip pain.   She would like to see ortho.     DM: she has been off diabetic medications but bg recently has been 140- 160. denies low bg.    LAD: she has lymph node on the left side of neck.   She has seen oncology and they ordered a PET scan for next wk.  Lesion is not painful.  Denies issues swallowing.    insomnia: she states she is unable to sleep.   She does not want any antidepressant or any medication that can affect her mood.   She wants to see psych before starting meds.    Nicotine: pt does not want to quit smoking       Past Medical History  Disease Name Date Onset Notes   Allergic rhinitis due to allergen 01/16/2020 Discussed OTC antihistamine for allergic sx prn.   Anticoagulation Medication Therapy --  --    Anxiety disorder 01/16/2020 Discussed anxiety and depression, pt declines SSRI, buspar, hydroxyzine. She " states Xanax is only thing effective in past. discussed risks of benzo use including death, respiratory depression. Discussed in detail with pt that we will not rx controlled medication for anxiety. She was given handout for local psychiatrist to call and schedule apt. to er if si/hi.    Arthritis 01/16/2020 Keep follow up with pain mgmt. Will refer to PT for stretches/strengthening. Discussed we will not rx pain meds from our office.   Asthma --  --    Back Pain  --  --    Breast lump --  --    Cancer --  --    CHF (congestive heart failure) 01/16/2020 Will request note from cardiology, new referral placed today. requesting previous pcp records as well as cardiology records   Chronic Obstructive Pulmonary Disease --  No current sx. Encouraged smoking cessation   Chronic pain 01/16/2020 Keep follow up with pain mgmt. We will request their records.    COPD (chronic obstructive pulmonary disease) 01/16/2020 Encouraged smoking cessation. requesting previous records.   Depression --  Discussed med options. Pt declined. Given handout to call psych.   Diabetes mellitus, type 2 01/16/2020 requesting recent labs. Pt has not been taking Januvia due to side effects. Will hold off starting new medication until we see recent a1c.   GERD (gastroesophageal reflux disease) --  Referred back to gi for updated EGD and colonoscopy.   Heart murmur 01/16/2020 --    History of Hodgkin's lymphoma 01/16/2020 Requesting previous record, will refer back to oncology since pt has not had recent follow up.    Kidney stones --  --    Knee pain 01/16/2020 Discussed knee pain, will refer to home health for PT. No meds since pt est with pain mgmt.   Limb Pain --  --    Limb Swelling --  --    Lupus (systemic lupus erythematosus) --  --    Mitral valve prolapse --  --    Neck pain --  --    Nicotine dependence 01/16/2020 encouraged smoking cessation, pt declined.    Night sweats --  --    Nipple discharge --  --    Peripheral vascular disease  01/16/2020 Discussed PVD noted on patient legs. Cont compression/compression socks. Will refer home health to help with wound care of 2 small ulcers on back of L calf. Pt will rtc if sx worsen, fever, odor, exudate.    Presence of intrathecal pump 01/16/2020 Pt has pain pump    Pulmonary embolus --  --    Renal calculus or stone --  --    Sinus trouble --  Use otc antihistamine.   Skin breakdown 01/16/2020 Keep lesions clean and dry. We will send home health to do wound care on posterior calf lesions         Past Surgical History  Procedure Name Date Notes   Back --  --    Back surgery --  --    Bladder Repair --  Early 2000s   Cholecystectomy 2001 --    Direct revascularization heart muscle with catheter stent, prosthesis, or vein graft 2019 --    Gallbladder --  --    Hemorrhoidectomy --  --    Hernia --  --    Hysterectomy --  --    Hysterectomy-Abdominal 1996 --    Lymph Node Excision --  --    Pain Pump 2013, 2019 --    Partial Hysterectomy --  --    Port Placement --  --    Spinal Fusion 1998 --    Tumor removal --  between vaginal and rectal wall bladder-2000         Medication List  Name Date Started Instructions   bacitracin 500 unit/gram topical ointment 04/28/2020 apply to affected area by external route 2 times a day   diabetic supplies, miscellan. 01/16/2020 Glucometer and test strips to test bg 1x/day.   Eucerin Plus Intensive Repair topical cream 04/28/2020 apply to lower legs by topical route twice daily   furosemide 40 mg oral tablet 05/20/2020 take 1 tablet (40 mg) by oral route once daily   gabapentin 300 mg oral capsule  take 1 capsule (300 mg) by oral route 3 times per day   lancets miscellaneous misc 01/16/2020 use as directed   Pain Pump-Dilaudid  --    Silvadene 1 % topical cream 05/19/2020 APPLY TO AFFECTED AREA BID   Zyrtec 10 mg oral tablet 04/28/2020 take 1 tablet (10 mg) by oral route once daily         Allergy List  Allergen Name Date Reaction Notes   amoxicillin --  SOA --    aspirin  "--  tinnitus --    CEPHALOSPORINS --  lip swelling --    clarithromycin --  unknown --    Compazine --  unknown --    Egg --  --  --    erythromycin --  --  --    IVP Dye --  \"bad reaction\" --    NSAIDS --  unknown --    PENICILLINS --  unknown --    Phenergan --  unknown --    SULFA (SULFONAMIDES) --  unknown --        Allergies Reconciled  Family Medical History  Disease Name Relative/Age Notes   Cancer, Unspecified Sister/   --    Diabetes, unspecified type Mother/   Mother   Spine Problems Mother/   --    Heart Attack (MI) Mother/   --    Renal Calculus Mother/   Mother   Family history of breast cancer Mother/   Mother   Renal cancer Mother/   Mother  Mother/70s   -Father's Family History Unknown Father/   Father   Bladder calculus Grandmother (maternal)/  Mother/   Mother; Grandmother (maternal)         Reproductive History  Menstrual   Menopause Status: Postmenopausal HRT?: No   Pregnancy Summary   Total Pregnancies: 0 Full Term: 0 Premature: 0   Ab Induced: 0 Ab Spontaneous: 0 Ectopics: 0   Multiples: 0 Livin         Social History  Finding Status Start/Stop Quantity Notes   Alcohol Never --/-- --  drinks no   Caffeine Current - status unknown --/-- --  drinks coffee  drinks regularly; 3-4 times per day   Disabled --  --/-- --  --     --  --/-- --  two- one    Second hand smoke exposure Current some day --/-- --  yes   Tobacco Current every day 14/-- 2 PPD started smoking at age 89; smoked 2 cigarette(s) per day  started smoking at age 1; smoked 2 cigarette(s) per day         Review of Systems  · Constitutional  o Denies  o : fever, fatigue, weight loss, weight gain  · Eyes  o Denies  o : eye pain, blurred vision  · HENT  o Denies  o : neck pain, neck tenderness, sore throat, dry mouth, hoarseness  · Cardiovascular  o Denies  o : lower extremity edema, claudication, chest pressure, palpitations  · Respiratory  o Denies  o : shortness of breath, wheezing, cough, hemoptysis, dyspnea on " "exertion  · Gastrointestinal  o Denies  o : nausea, vomiting, diarrhea, constipation, abdominal pain  · Genitourinary  o Denies  o : urgency, frequency, dysuria, hematuria  · Integument  o Admits  o : rash  · Neurologic  o Denies  o : altered mental status, tingling or numbness  · Musculoskeletal  o Admits  o : joint pain, hip pain  · Psychiatric  o Admits  o : difficulty sleeping  · Heme-Lymph  o Admits  o : lymph node enlargement or tenderness      Vitals  Date Time BP Position Site L\R Cuff Size HR RR TEMP (F) WT  HT  BMI kg/m2 BSA m2 O2 Sat HC       02/17/2020 12:02 PM 96/64 Sitting    93 - R  97.8 163lbs 0oz    92 %    04/13/2020 02:19 /60 Sitting    90 - R 14 98.2 163lbs 0oz 5'  4\" 27.98 1.83 96 %    05/28/2020 01:10 /62 Sitting    91 - R 16  174lbs 6oz 5'  4\" 29.93 1.89 93 %          Physical Examination  · Constitutional  o Appearance  o : no acute distress, well-nourished  · Head and Face  o Head  o :   § Inspection  § : atraumatic, normocephalic  · Eyes  o Eyes  o : extraocular movements intact, no scleral icterus, no conjunctival injection  · Ears, Nose, Mouth and Throat  o Ears  o :   § External Ears  § : normal  o Nose  o :   § Intranasal Exam  § : nares patent  o Oral Cavity  o :   § Oral Mucosa  § : moist mucous membranes  · Neck  o Thyroid  o : gland size normal, nontender, no nodules or masses present on palpation, symmetric  · Respiratory  o Respiratory Effort  o : breathing comfortably, symmetric chest rise  o Auscultation of Lungs  o : clear to asculatation bilaterally, no wheezes, rales, or rhonchii  · Cardiovascular  o Heart  o :   § Auscultation of Heart  § : regular rate and rhythm, no murmurs, rubs, or gallops  o Peripheral Vascular System  o :   § Extremities  § : minimal non pitting edema on bilateral lower extremities  · Gastrointestinal  o Abdominal Examination  o :   § Abdomen  § : bowel sounds present, non-distended, non-tender  · Lymphatic  o Neck  o : LAD present on L " side of neck  o Supraclavicular Nodes  o : no supraclavicular nodes  · Skin and Subcutaneous Tissue  o General Inspection  o : legs appear improved from previous exam. chronic discoloration noted. small open wound on L calf, culture sent today of clear discharge.   · Neurologic  o Mental Status Examination  o :   § Orientation  § : grossly oriented to person, place and time  o Gait and Station  o :   § Gait Screening  § : normal gait  · Psychiatric  o General  o : normal mood and affect              Assessment  · Anxiety disorder     300.00/F41.9  Discussed anxiety and depression, pt declines SSRI, buspar, hydroxyzine. She states Xanax is only thing effective in past. discussed risks of benzo use including death, respiratory depression. Discussed in detail with pt that we will not rx controlled medication for anxiety. She was given handout for local psychiatrist to call and schedule apt. to er if si/hi.   · CHF (congestive heart failure)     428.0/I50.9  BNP to make sure swelling not related to heart. Discussed I do not think it is due to nature of swelling and no pitting edema.  · Chronic pain     338.29/G89.29  Keep follow up with pain mgmt. We will request their records.  · Depression     296.31  Discussed med options. Pt declined. Given handout to call psych.  · Diabetes mellitus, type 2     250.00/E11.9  Labs today to eval, discussed likely needs to restart medication due to elevated bg at home  · History of Hodgkin's lymphoma     V10.72/Z85.71  Keep oncology follow up and PET scan to eval lymph node in neck  · Nicotine dependence     305.1/F17.200  encouraged smoking cessation, pt declined.  · Peripheral vascular disease     443.9/I73.9  Discussed PVD noted on patient legs. Cont compression/compression socks. Will refer home health to help with wound care of 2 small ulcers on back of L calf. Pt will rtc if sx worsen, fever, odor, exudate.  · Limb Swelling     729.81/M79.89  discussed ddx swelling, legs appear  much better today compared to previous exams. Cont having home health wrap legs. Will refer to derm per pt request. Discussed stasis dermatitis and need to control risk factors to prevent worsening. Discussed chronic disease process. Encouraged smoking cessation.  · Lupus     710.0/M32.9  · Insomnia     780.52/G47.00  discussed numerous medication options we could try and pt declined every one stating they did not work for her in the past or they caused an adr. Pt given number to local psychiatry to call per request to set up apt for eval.  · Lymphadenopathy     785.6/R59.1  keep follow up with oncology for PET scan  · Bilateral hip pain       Pain in right hip     719.45/M25.551  Pain in left hip     719.45/M25.552  referred to ortho per pt request.  · Stasis dermatitis     454.1/I87.2    Problems Reconciled  Plan  · Orders  o ACO-17: Screened for tobacco use AND received tobacco cessation intervention (4004F) - 305.1/F17.200 - 05/28/2020  o Hgb A1c [facility] ([id number]) - 250.00/E11.9, V10.72/Z85.71 - 05/28/2020  o Physical, Primary Care Panel (CBC, CMP, Lipid, TSH) [facility] ([id number], [id number], 03771, 55625) - 296.31, 250.00/E11.9, 710.0/M32.9 - 05/28/2020  o ACO-39: Current medications updated and reviewed () - - 05/28/2020  o BNP (brain natriuretic peptide measurement) (58490) - 428.0/I50.9, 250.00/E11.9 - 05/28/2020  o Wound Culture (24064) - - 05/28/2020  o DERMATOLOGY CONSULTATION (DERMA) - 454.1/I87.2 - 05/29/2020  o ORTHOPEDIC CONSULTATION (ORTHO) - 719.45/M25.551, 719.45/M25.552 - 05/29/2020  · Medications  o metformin 500 mg oral tablet   SIG: take 1 tablet (500 mg) by oral route 2 times per day with morning and evening meals   DISP: (60) tablets with 3 refills  Prescribed on 05/29/2020     o Medications have been Reconciled  o Transition of Care or Provider Policy  · Instructions  o *Form of nicotine being used: cigarette  o Patient was strongly encouraged to discontinue use of any nicotine containing product or  minimize the use of the product.  o Patient was educated/instructed on their diagnosis, treatment and medications prior to discharge from the clinic today.  · Disposition  o Call or Return if symptoms worsen or persist.  o Follow up in 6 weeks            Electronically Signed by: Damari Cornejo PA-C -Author on May 29, 2020 11:18:09 AM

## 2021-05-13 NOTE — PROGRESS NOTES
Progress Note      Patient Name: Isha Llanes   Patient ID: [id number]   Sex: Female   YOB: 1965    Primary Care Provider: Damari Cornejo PA-C   Referring Provider: Damari Cornejo PA-C    Visit Date: August 13, 2020    Provider: Damari Cornejo PA-C   Location: [facility]   Location Address: [address] [place] [address], [address]  [place], [place]  [id number]   Location Phone: [phone]          Chief Complaint  · Follow up  · Diabetes  · Nicotine dependence      History Of Present Illness  TELEHEALTH TELEPHONE VISIT  Chief Complaint: Follow up on diabetes   Isha Llanes is a 54 year old /White female who is presenting for evaluation via telehealth telephone visit. Verbal consent obtained before beginning visit.   Provider spent 12 minutes with the patient during the telehealth visit.   The following staff were present during this visit: Damari Cornejo PA-C   Past Medical History/ Overview of Patient Symptoms     9:34-9:47    DM: pt has started 6 units of insulin in am and sliding scale short acting with meals but states it makes her feel tired. Usually using 2 units with meals.  She has not started pancreatic enzymes  BG running around 112. Denies low.     Diarrhea: had 24 hours bug 4 nights ago  Denies blood in stool.   Everyone in the house had the same thing.  She has been staying hydrated.    Hodgkin's: has follow up with oncology 8/17. She had to reschedule due to covid.    Anxiety: she has not gotten apt with psychiatrist yet  She is requesting benzos for anxiety as that is the only thing that could.   She lost the paper with the phone numbers and would like to be mailed a new form with local counselors.  She feels panicked.   Denies si/hi.  Denies depression.       Past Medical History  Disease Name Date Onset Notes   Allergic rhinitis due to allergen 01/16/2020 Discussed OTC antihistamine for allergic sx prn.   Anticoagulation Medication Therapy --  --     Anxiety disorder 01/16/2020 Discussed anxiety and depression, pt declines SSRI, buspar, hydroxyzine. She states Xanax is only thing effective in past. discussed risks of benzo use including death, respiratory depression. Discussed in detail with pt that we will not rx controlled medication for anxiety. She was given handout for local psychiatrist to call and schedule apt. to er if si/hi.    Arthritis 1/16/2020 Keep follow up with pain mgmt. Will refer to PT for stretches/strengthening. Discussed we will not rx pain meds from our office.   Asthma --  --    Back Pain  --  --    Bladder Disorder --  --    Bowel Disease --  --    Breast lump --  --    Cancer --  --    CHF (congestive heart failure) 01/16/2020 Will request note from cardiology, new referral placed today. requesting previous pcp records as well as cardiology records   Chronic Obstructive Pulmonary Disease --  No current sx. Encouraged smoking cessation   Chronic pain 01/16/2020 Keep follow up with pain mgmt. We will request their records.    COPD (chronic obstructive pulmonary disease) 01/16/2020 Encouraged smoking cessation. requesting previous records.   Depression --  Discussed med options. Pt declined. Given handout to call psych.   Diabetes --  --    Diabetes mellitus, type 2 01/16/2020 requesting recent labs. Pt has not been taking Januvia due to side effects. Will hold off starting new medication until we see recent a1c.   GERD (gastroesophageal reflux disease) --  Referred back to gi for updated EGD and colonoscopy.   Heart Disease --  --    Heart murmur 01/16/2020 --    History of Hodgkin's lymphoma 01/16/2020 Requesting previous record, will refer back to oncology since pt has not had recent follow up.    Hyperlipemia --  --    Kidney Disease --  --    Kidney stones --  --    Knee pain 01/16/2020 Discussed knee pain, will refer to home health for PT. No meds since pt est with pain mgmt.   Limb Pain --  --    Limb Swelling --  --    Lupus  (systemic lupus erythematosus) --  --    Mitral valve prolapse --  --    Neck pain --  --    Neurologic disorder --  --    Nicotine dependence 01/16/2020 encouraged smoking cessation, pt declined.    Night sweats --  --    Nipple discharge --  --    Peripheral vascular disease 01/16/2020 Discussed PVD noted on patient legs. Cont compression/compression socks. Will refer home health to help with wound care of 2 small ulcers on back of L calf. Pt will rtc if sx worsen, fever, odor, exudate.    Presence of intrathecal pump 01/16/2020 Pt has pain pump    Pulmonary embolus --  --    Renal calculus or stone --  --    Sinus trouble --  Use otc antihistamine.   Skin breakdown 01/16/2020 Keep lesions clean and dry. We will send home health to do wound care on posterior calf lesions   Ulcer --  --          Past Surgical History  Procedure Name Date Notes   Back --  --    Back surgery --  --    Bladder Repair --  Early 2000s   Cholecystectomy 2001 --    Colonoscopy --  --    Direct revascularization heart muscle with catheter stent, prosthesis, or vein graft 2019 --    Gallbladder --  --    heart surgery --  --    Hemorrhoidectomy --  --    Hernia --  --    Hysterectomy --  --    Hysterectomy-Abdominal 1996 --    Lymph Node Excision --  --    Pain Pump 2013, 2019 --    Partial Hysterectomy --  --    Port Placement --  --    Spinal Fusion 1998 --    Tonsillectomy --  --    Tumor removal --  between vaginal and rectal wall bladder-2000         Medication List  Name Date Started Instructions   Admelog SoloStar U-100 Insulin 100 unit/mL subcutaneous insulin pen  inject by subcutaneous route per prescriber's instructions. Insulin dosing requires individualization.   Blood Glucose Test miscellaneous strip 06/03/2020 use as directed to check blood sugar TID   furosemide 40 mg oral tablet 08/10/2020 take 1 tablet (40 mg) by oral route once daily   gabapentin 300 mg oral capsule  take 1 capsule (300 mg) by oral route 3 times per day  "  lancets miscellaneous misc 2020 use as directed   Pain Pump-Dilaudid  --    Tresiba FlexTouch U-100 100 unit/mL (3 mL) subcutaneous insulin pen  inject by subcutaneous route as per insulin protocol   triamcinolone acetonide 0.1 % topical ointment 07/15/2020 apply a thin layer to the affected area(s) by topical route 3 times per day         Allergy List  Allergen Name Date Reaction Notes   amoxicillin --  SOA --    aspirin --  tinnitus --    CEPHALOSPORINS --  lip swelling --    clarithromycin --  unknown --    Compazine --  unknown --    Egg --  --  --    erythromycin --  --  --    IVP Dye --  \"bad reaction\" --    NSAIDS --  unknown --    PENICILLINS --  unknown --    Phenergan --  unknown --    SULFA (SULFONAMIDES) --  unknown --        Allergies Reconciled  Family Medical History  Disease Name Relative/Age Notes   Heart Disease Daughter/  Father/  Mother/  Sister/   Mother; Father; Sister; Daughter   Cancer, Unspecified Father/  Mother/  Sister/   Mother; Father; Sister   Diabetes, unspecified type Daughter/  Father/  Mother/  Sister/   Mother; Father; Sister; Daughter  Mother   Spine Problems Mother/   --    Heart Attack (MI) Mother/   --    Renal Calculus Mother/   Mother   Family history of breast cancer Mother/   Mother   Renal Cancer Mother/   Mother  Mother/70s   -Father's Family History Unknown Father/   Father   Bladder calculus Grandmother (maternal)/  Mother/   Mother; Grandmother (maternal)   Blood Diseases Father/   Father   Osteoporosis Mother/  Sister/   Mother; Sister   Family history of Arthritis Brother/  Daughter/  Mother/   Mother; Brother; Daughter         Reproductive History  Menstrual   Menopause Status: Postmenopausal HRT?: No   Pregnancy Summary   Total Pregnancies: 0 Full Term: 0 Premature: 0   Ab Induced: 0 Ab Spontaneous: 0 Ectopics: 0   Multiples: 0 Livin         Social History  Finding Status Start/Stop Quantity Notes   Alcohol Never --/-- --  drinks no   Alcohol Use " Never --/-- --  does not drink   Caffeine Current - status unknown --/-- --  drinks coffee  drinks regularly; 3-4 times per day   Claustophobic Unknown --/-- --  yes   Disabled --  --/-- --  --     --  --/-- --  two- one    lives with children --  --/-- --  --    Recreational Drug Use Never --/-- --  no   Second hand smoke exposure Current some day --/-- --  yes   Tobacco Current - status unknown --/-- --  smoked 31 or more years  started smoking at age 89; smoked 2 cigarette(s) per day  started smoking at age 1; smoked 2 cigarette(s) per day         Physical Examination                Assessment  · Anxiety disorder     300.00/F41.9  Discussed anxiety, pt declines SSRI, buspar, hydroxyzine. She states Xanax is only thing effective in past. discussed risks of benzo use including death, respiratory depression. Discussed in detail with pt that we will not rx controlled medication for anxiety. We will mail list of local psychiatrist to call and schedule apt. to er if si/hi.   · Diabetes mellitus, type 2     250.00/E11.9  Cont insulin as rx from endocrine and follow up with their office. Encouraged to start pancreatic enzymes  · History of Hodgkin's lymphoma     V10.72/Z85.71  Keep follow up with oncology   · Viral gastroenteritis     008.8/A08.4  resolved now.    Problems Reconciled  Plan  · Orders  o Physician Telephone Evaluation, 11-20 minutes ([id number]) - - 2020  · Medications  o Medications have been Reconciled  o Transition of Care or Provider Policy  · Instructions  o Plan Of Care:   o Electronically Identified Patient Education Materials Provided Electronically  · Disposition  o Call or Return if symptoms worsen or persist.  o Follow up in 3 months            Electronically Signed by: Damari Cornejo PA-C -Author on 2020 09:46:48 AM  Electronically Co-signed by: Nicolette Pena MD -Reviewer on 2020 12:48:11 PM

## 2021-05-13 NOTE — PROGRESS NOTES
Progress Note      Patient Name: Isha Llanes   Patient ID: [id number]   Sex: Female   YOB: 1965    Primary Care Provider: Damari Cornejo PA-C   Referring Provider: Damari Cornejo PA-C    Visit Date: September 16, 2020    Provider: Damari Cornejo PA-C   [facility]: [facility]   Location Address: [address] [place] [address]  [id number]   Location Phone: [phone]          Chief Complaint  · Follow up office visit within 7 calendar days of discharge from inpatient status (high complexity).      History Of Present Illness  FOLLOW UP OFFICE VISIT WITHIN 7 CALENDAR DAYS OF INPATIENT STATUS (SEVERE COMPLEXITY)  Isha Llanes presents to office for follow up post discharge from inpatient status within 7 calendar days. Patient was contacted within 2 business days via phone conversation. Documentation of that phone contact is present in the patient's electronic chart. Patient was admitted to an inpatient faciliity on 09/07/2020 and discharged on 09/10/2020 due to: Sepsis   Admitting MD: Geraldo Gould MD   Her discharge summary has been reviewed and placed in the patient's electronic chart.   Patient's problem list is: Allergic rhinitis due to allergen, Anticoagulation Medication Therapy, Anxiety disorder, Arthritis, Asthma, Back Pain , Breast lump, Cancer, CHF (congestive heart failure), Chronic Obstructive Pulmonary Disease, Chronic pain, COPD (chronic obstructive pulmonary disease), Depression, Diabetes mellitus, type 2, GERD (gastroesophageal reflux disease), Heart murmur, History of Hodgkin's lymphoma, Kidney stones, Limb Pain, Limb Swelling, Mitral valve prolapse, Neck pain, Nicotine dependence, Night sweats, Nipple discharge, Peripheral vascular disease, Presence of intrathecal pump, Pulmonary embolus, Renal calculus or stone, and Sinus trouble   Patient's outpatient medication list has been reconciled with the medication list from the discharge summary  and has been reviewed with the patient. Current medication list is: Admelog SoloStar U-100 Insulin 100 unit/mL subcutaneous insulin pen, Blood Glucose Test miscellaneous strip, furosemide 40 mg oral tablet, gabapentin 300 mg oral capsule, lancets miscellaneous misc, ondansetron HCl 4 mg oral tablet, Pain Pump-Dilaudid, Rifadin 300 mg oral capsule, Tresiba FlexTouch U-100 100 unit/mL (3 mL) subcutaneous insulin pen, and triamcinolone acetonide 0.1 % topical ointment      Pt went to [facility] for fever and was admitted 9/7 for sepsis, acute hypoxic resp failure, acute copd. WBC were elevated on admission. CT chest showed no acute changes, small hiatal hernia, hepatic steatosis, stable 2mm non obstructing R side stone, mild CAD calcification. She was put on broad spectrum abx. O2 was 83%. COVID negative x 2. Negative Cdiff test. LE showed chronic venous stasis not current infection. Urine culture, sputum culture, and 2 blood cultures negative. fever resolved and wbc back in normal at discharge. No source ID so she was discharged on rifampin for possible tick infection. She was discharged on 9/10.    Since discharge she has not had any more fever.     Diarrhea: Stool has been loose, about 5 episodes/day. Denies watery or hard stool, blood in stool.   Admits to eating raw hamburger.    Rifampin caused hallucination so she d/c a few days ago.     DM: bg 217 this am.   She restarted 6 units of long acting insulin.   she is on sliding scale for short acting insulin.  Denies low bg. She had been off insulin since she was discharged from the hospital.     Denies resp distress or difficulty breathing.     Leg redness: has improved since discharge. Denies drainage from legs.   Denies warmth to touch.       Past Medical History  Disease Name Date Onset Notes   Allergic rhinitis due to allergen 01/16/2020 Discussed OTC antihistamine for allergic sx prn.   Anticoagulation Medication Therapy --  --    Anxiety disorder 01/16/2020  Discussed anxiety and depression, pt declines SSRI, buspar, hydroxyzine. She states Xanax is only thing effective in past. discussed risks of benzo use including death, respiratory depression. Discussed in detail with pt that we will not rx controlled medication for anxiety. She was given handout for local psychiatrist to call and schedule apt. to er if si/hi.    Arthritis 1/16/2020 Keep follow up with pain mgmt. Will refer to PT for stretches/strengthening. Discussed we will not rx pain meds from our office.   Asthma --  --    Back Pain  --  --    Bladder Disorder --  --    Bowel Disease --  --    Breast lump --  --    CAD (coronary artery disease) 09/16/2020 --    Cancer --  --    CHF (congestive heart failure) 01/16/2020 Will request note from cardiology, new referral placed today. requesting previous pcp records as well as cardiology records   Chronic Obstructive Pulmonary Disease --  No current sx. Encouraged smoking cessation   Chronic pain 01/16/2020 Keep follow up with pain mgmt. We will request their records.    COPD (chronic obstructive pulmonary disease) 01/16/2020 Encouraged smoking cessation. requesting previous records.   Depression --  Discussed med options. Pt declined. Given handout to call psych.   Diabetes --  --    Diabetes mellitus, type 2 01/16/2020 requesting recent labs. Pt has not been taking Januvia due to side effects. Will hold off starting new medication until we see recent a1c.   GERD (gastroesophageal reflux disease) --  Referred back to gi for updated EGD and colonoscopy.   Heart Disease --  --    Heart murmur 01/16/2020 --    Hepatic steatosis 09/16/2020 --    Hiatal hernia 09/16/2020 --    History of Hodgkin's lymphoma 01/16/2020 Requesting previous record, will refer back to oncology since pt has not had recent follow up.    Hyperlipemia --  --    Kidney Disease --  --    Kidney stones --  --    Knee pain 01/16/2020 Discussed knee pain, will refer to home health for PT. No meds  since pt est with pain mgmt.   Limb Pain --  --    Limb Swelling --  --    Lupus (systemic lupus erythematosus) --  --    Mitral valve prolapse --  --    Neck pain --  --    Neurologic disorder --  --    Nicotine dependence 01/16/2020 encouraged smoking cessation, pt declined.    Night sweats --  --    Nipple discharge --  --    Peripheral vascular disease 01/16/2020 Discussed PVD noted on patient legs. Cont compression/compression socks. Will refer home health to help with wound care of 2 small ulcers on back of L calf. Pt will rtc if sx worsen, fever, odor, exudate.    Presence of intrathecal pump 01/16/2020 Pt has pain pump    Pulmonary embolus --  --    Renal calculus or stone --  --    Renal stone 09/16/2020 --    Sinus trouble --  Use otc antihistamine.   Skin breakdown 01/16/2020 Keep lesions clean and dry. We will send home health to do wound care on posterior calf lesions   Ulcer --  --          Past Surgical History  Procedure Name Date Notes   Back --  --    Back surgery --  --    Bladder Repair --  Early 2000s   Cholecystectomy 2001 --    Colonoscopy --  --    Direct revascularization heart muscle with catheter stent, prosthesis, or vein graft 2019 --    Gallbladder --  --    heart surgery --  --    Hemorrhoidectomy --  --    Hernia --  --    Hysterectomy --  --    Hysterectomy-Abdominal 1996 --    Lymph Node Excision --  --    Pain Pump 2013, 2019 --    Partial Hysterectomy --  --    Port Placement --  --    Spinal Fusion 1998 --    Tonsillectomy --  --    Tumor removal --  between vaginal and rectal wall bladder-2000         Medication List  Name Date Started Instructions   Admelog SoloStar U-100 Insulin 100 unit/mL subcutaneous insulin pen  inject by subcutaneous route per prescriber's instructions. Insulin dosing requires individualization.   Blood Glucose Test miscellaneous strip 06/03/2020 use as directed to check blood sugar TID   furosemide 40 mg oral tablet 08/10/2020 take 1 tablet (40 mg) by  "oral route once daily   gabapentin 300 mg oral capsule  take 1 capsule (300 mg) by oral route 3 times per day   lancets miscellaneous misc 06/03/2020 use as directed   ondansetron HCl 4 mg oral tablet 09/16/2020 take one tab by mouth every 8 hours as needed   Pain Pump-Dilaudid  --    Tresiba FlexTouch U-100 100 unit/mL (3 mL) subcutaneous insulin pen  inject by subcutaneous route as per insulin protocol   triamcinolone acetonide 0.1 % topical ointment 07/15/2020 apply a thin layer to the affected area(s) by topical route 3 times per day         Allergy List  Allergen Name Date Reaction Notes   amoxicillin --  SOA --    aspirin --  tinnitus --    CEPHALOSPORINS --  lip swelling --    clarithromycin --  unknown --    Compazine --  unknown --    Egg --  --  --    erythromycin --  --  --    IVP Dye --  \"bad reaction\" --    NSAIDS --  unknown --    PENICILLINS --  unknown --    Phenergan --  unknown --    rifampin --  hallucinations --    SULFA (SULFONAMIDES) --  unknown --        Allergies Reconciled  Family Medical History  Disease Name Relative/Age Notes   Heart Disease Daughter/  Father/  Mother/  Sister/   Mother; Father; Sister; Daughter   Cancer, Unspecified Father/  Mother/  Sister/   Mother; Father; Sister   Diabetes, unspecified type Daughter/  Father/  Mother/  Sister/   Mother; Father; Sister; Daughter  Mother   Spine Problems Mother/   --    Heart Attack (MI) Mother/   --    Renal Calculus Mother/   Mother   Family history of breast cancer Mother/   Mother   Renal Cancer Mother/   Mother  Mother/70s   -Father's Family History Unknown Father/   Father   Bladder calculus Grandmother (maternal)/  Mother/   Mother; Grandmother (maternal)   Blood Diseases Father/   Father   Osteoporosis Mother/  Sister/   Mother; Sister   Family history of Arthritis Brother/  Daughter/  Mother/   Mother; Brother; Daughter         Reproductive History  Menstrual   Menopause Status: Postmenopausal HRT?: No   Pregnancy Summary "   Total Pregnancies: 0 Full Term: 0 Premature: 0   Ab Induced: 0 Ab Spontaneous: 0 Ectopics: 0   Multiples: 0 Livin         Social History  Finding Status Start/Stop Quantity Notes   Alcohol Never --/-- --  drinks no   Alcohol Use Never --/-- --  does not drink   Caffeine Current - status unknown --/-- --  drinks coffee  drinks regularly; 3-4 times per day   Claustophobic Unknown --/-- --  yes   Disabled --  --/-- --  --     --  --/-- --  two- one    lives with children --  --/-- --  --    Recreational Drug Use Never --/-- --  no   Second hand smoke exposure Current some day --/-- --  yes   Tobacco Current every day --/-- 1.5 ppd smoked 31 or more years  started smoking at age 89; smoked 2 cigarette(s) per day  started smoking at age 1; smoked 2 cigarette(s) per day         Review of Systems  · Constitutional  o Denies  o : fatigue, fever, weight gain, weight loss, chills  · Eyes  o Denies  o : changes in vision  · HENT  o Denies  o : ear pain, sore throat  · Cardiovascular  o Admits  o : edema (swelling)  o Denies  o : chest Pain, palpitations  · Respiratory  o Denies  o : frequent cough, shortness of breath  · Gastrointestinal  o Admits  o : diarrhea  o Denies  o : abdominal pain, nausea, vomiting, reflux/heartburn  · Genitourinary  o Denies  o : dysuria, urinary frequency, urinary urgency, polyuria  · Integument  o Admits  o : rash  · Neurologic  o Denies  o : headache, tingling or numbness, dizziness  · Musculoskeletal  o Denies  o : joint pain, myalgias  · Endocrine  o Denies  o : polydipsia, polyphagia  · Psychiatric  o Denies  o : mood changes, memory changes, SI/HI  · Heme-Lymph  o Denies  o : easy bruising, easy bleeding, lymph node enlargement or tenderness  · Allergic-Immunologic  o Denies  o : eczema, seasonal allergies, urticaria      Vitals  Date Time BP Position Site L\R Cuff Size HR RR TEMP (F) WT  HT  BMI kg/m2 BSA m2 O2 Sat HC       2020 12:02 PM 96/64 Sitting    93 - R  " 97.8 163lbs 0oz    92 %    04/13/2020 02:19 /60 Sitting    90 - R 14 98.2 163lbs 0oz 5'  4\" 27.98 1.83 96 %    05/28/2020 01:10 /62 Sitting    91 - R 16  174lbs 6oz 5'  4\" 29.93 1.89 93 %    09/16/2020 01:50 /64 Sitting    90 - R 15 98.2 173lbs 4oz    94 %          Physical Examination  · Constitutional  o Appearance  o : no acute distress, well-nourished  · Head and Face  o Head  o :   § Inspection  § : atraumatic, normocephalic  · Eyes  o Eyes  o : extraocular movements intact, no scleral icterus, no conjunctival injection  · Ears, Nose, Mouth and Throat  o Ears  o :   § External Ears  § : normal  o Nose  o :   § Intranasal Exam  § : nares patent  o Oral Cavity  o :   § Oral Mucosa  § : moist mucous membranes  · Neck  o Thyroid  o : gland size normal, nontender, no nodules or masses present on palpation, symmetric  · Respiratory  o Respiratory Effort  o : breathing comfortably, symmetric chest rise  o Auscultation of Lungs  o : clear to asculatation bilaterally, no wheezes, rales, or rhonchii  · Cardiovascular  o Heart  o :   § Auscultation of Heart  § : regular rate and rhythm, no murmurs, rubs, or gallops  o Peripheral Vascular System  o :   § Extremities  § : minimal edema bilaterally   · Gastrointestinal  o Abdominal Examination  o :   § Abdomen  § : bowel sounds present, non-distended, non-tender  · Skin and Subcutaneous Tissue  o General Inspection  o : minimal erythema to bilateral lower legs  · Neurologic  o Mental Status Examination  o :   § Orientation  § : grossly oriented to person, place and time  o Gait and Station  o :   § Gait Screening  § : normal gait  · Psychiatric  o General  o : normal mood and affect              Assessment  · Diarrhea     787.91/R19.7  discussed diarrhea possibly from abx use in hospital, will monitor for resolution. Do not take anything to stop diarrhea since possibly bacteria, given orders for stool samples if not resolved in 4 days. Discussed dangers of " eating raw hamburger meat, including ecoli infection. Pt will let us know if not resolved next wk or if sx worsen, abd pain, profuse vomiting.  · Sepsis       Sepsis, unspecified organism     038.9/A41.9  reviewed hospital discharge summary. Discussed reaction to abx, no further abx needed since fever resolved and tick panel and all cultures negative.   · Elevated WBC count     288.60/D72.829  improved before discharge.  · Chronic venous stasis     459.81/I87.8  legs look better today than last few visits, cont to use compression prn. cont to control comorbidities to prevent worsening.   · Diabetes     250.00/E11.9  Discussed need to restart dm meds and take as directed.   · Nicotine dependence     305.1/F17.200  encouraged smoking cessation, pt declined.  · Respiratory failure     518.81/J96.90  discussed resp sx, sx have improved since discharge so will monitor at this time.  · Hiatal hernia     553.3/K44.9  discussed hernia noted on CT while inpatient, will monitor.   · Hepatic steatosis     571.8/K76.0  discussed fatty liver noted on CT while inpatient, will monitor.   · Renal stone     592.0/N20.0  discussed stone noted on CT while inpatient, will monitor.   · CAD (coronary artery disease)     414.00/I25.10  discussed CAD noted on CT while inpatient, will cont to control comorbidities     Problems Reconciled  Plan  · Orders  o Discharge medications reconciled with the current medication list (1111F) - - 09/16/2020  o Stool culture (88583, 28205) - 787.91/R19.7, 038.9/A41.9, 288.60/D72.829, 459.81/I87.8 - 09/16/2020  o C Diff Culture [facility] ([id number]) - 787.91/R19.7, 038.9/A41.9, 288.60/D72.829, 459.81/I87.8 - 09/16/2020  o Giardia and Cryptosporidium Enzyme Immunoassay [facility] (69799, 44216) - 787.91/R19.7, 038.9/A41.9, 288.60/D72.829, 459.81/I87.8 - 09/16/2020  o Lactoferrin, fecal (64140) - 787.91/R19.7, 038.9/A41.9, 288.60/D72.829 - 09/16/2020  · Medications  o ondansetron HCl 4 mg oral tablet   SIG: take one tab by  mouth every 8 hours as needed   DISP: (30) tablets with 0 refills  Adjusted on 09/16/2020     o Rifadin 300 mg oral capsule   SIG: take one tab, PO BID before meals for 6 days   DISP: (12) capsules with 0 refills  Discontinued on 09/16/2020     o Medications have been Reconciled  o Transition of Care or Provider Policy  · Instructions  o Patient discharge summary has been reviewed and placed in patient's electronic medical record.  o Patient received a phone call from my office within 2 business days of discharge from inpatient status, and documented within the patient's chart.  o Also patient was seen (face to face) for follow up evaluation within 7 calendar days of discharge from inpatient status for a high complexity issue.  o Patient was educated on their diagnosis, treatment and any medication changes while being evaluated today.  o Electronically Identified Patient Education Materials Provided Electronically  · Disposition  o Call or Return if symptoms worsen or persist.  o Keep follow up appt as scheduled            Electronically Signed by: Damari Cornejo PA-C -Author on September 16, 2020 04:46:10 PM

## 2021-05-13 NOTE — PROGRESS NOTES
Quick Note      Patient Name: Isha Llanes   Patient ID: [id number]   Sex: Female   YOB: 1965    Primary Care Provider: Damari Cornejo PA-C   Referring Provider: Damari Cornejo PA-C    Visit Date: May 7, 2020    Provider: Damari Cornejo PA-C   Location: [facility]   Location Address: [address] [place] [address], [address]  [place], [place]  [id number]   Location Phone: [phone]          History Of Present Illness  TELEHEALTH TELEPHONE VISIT  Chief Complaint: Follow Up   Isha Llanes is a 54 year old /White female who is presenting for evaluation via telehealth telephone visit. Verbal consent obtained before beginning visit.   Provider spent 10 minutes with the patient during telehealth visit.   The following staff were present during this visit: Damari Cornejo PA-C   Past Medical History/Overview of Patient Symptoms     11:14- 11:24  Oncology: she states she is going to reschedule CT scans.     Depression: denies feeling down, sad, worried, anxious, si/hi.     Pain: she is still seeing pain dr monthly, she has to go in to get pain pump refilled.     Leg swelling: home health is coming out 1x/wk. She states they are wrapping legs and putting medication on them. She states she is putting cream on her own between nurse visits. She states she needs curlex for legs. She has sterile water, cream, and ace bandage.  Leg cellulitis seems to come and go. At times, it looks better, other times it is flared up. Today it looks ok per pt. She is unable to walk on legs when it swells.       Physical Examination                Assessment  · Chronic pain     338.29/G89.29  Keep follow up with pain mgmt. We will request their records.  · Depression     296.31  Pt states mood has been doing well.  · History of Hodgkin's lymphoma     V10.72/Z85.71  encouraged pt to reschedule imaging ordered by [facility] and make sure she keeps all apts as scheduled.  · Limb Swelling     729.81/M79.89  Will  call home health and get order for pt to have curlex to use at home PRN.    Problems Reconciled  Plan  · Orders  o Physican Telephone evaluation, 5-10 min ([id number]) - - 05/07/2020  · Medications  o Medications have been Reconciled  o Transition of Care or Provider Policy  · Instructions  o Plan Of Care:   o Chronic conditions reviewed and taken into consideration for today's treatment plan.  o Patient instructed to seek medical attention urgently for new or worsening symptoms.  o Patient was educated/instructed on their diagnosis, treatment and medications prior to discharge from the clinic today.  · Disposition  o Call or Return if symptoms worsen or persist.  o Keep follow up appt as scheduled            Electronically Signed by: Damari Cornejo PA-C -Author on May 7, 2020 01:10:04 PM  Electronically Co-signed by: Nicolette Pena MD -Reviewer on May 7, 2020 03:31:56 PM

## 2021-05-14 VITALS
BODY MASS INDEX: 30.73 KG/M2 | HEART RATE: 102 BPM | SYSTOLIC BLOOD PRESSURE: 128 MMHG | TEMPERATURE: 98.2 F | WEIGHT: 180 LBS | RESPIRATION RATE: 15 BRPM | HEIGHT: 64 IN | DIASTOLIC BLOOD PRESSURE: 84 MMHG | OXYGEN SATURATION: 91 %

## 2021-05-14 VITALS
HEART RATE: 90 BPM | DIASTOLIC BLOOD PRESSURE: 64 MMHG | WEIGHT: 173.25 LBS | RESPIRATION RATE: 15 BRPM | SYSTOLIC BLOOD PRESSURE: 134 MMHG | BODY MASS INDEX: 27.13 KG/M2 | OXYGEN SATURATION: 94 % | TEMPERATURE: 98.2 F

## 2021-05-14 VITALS
WEIGHT: 180 LBS | RESPIRATION RATE: 16 BRPM | HEIGHT: 64 IN | DIASTOLIC BLOOD PRESSURE: 80 MMHG | BODY MASS INDEX: 30.73 KG/M2 | TEMPERATURE: 98.3 F | SYSTOLIC BLOOD PRESSURE: 114 MMHG | HEART RATE: 94 BPM | OXYGEN SATURATION: 93 %

## 2021-05-14 VITALS
TEMPERATURE: 98.2 F | WEIGHT: 168 LBS | HEART RATE: 96 BPM | RESPIRATION RATE: 16 BRPM | BODY MASS INDEX: 28.68 KG/M2 | OXYGEN SATURATION: 95 % | HEIGHT: 64 IN | SYSTOLIC BLOOD PRESSURE: 136 MMHG | DIASTOLIC BLOOD PRESSURE: 80 MMHG

## 2021-05-14 NOTE — PROGRESS NOTES
"   Progress Note      Patient Name: Isha Llanes   Patient ID: [id number]   Sex: Female   YOB: 1965    Primary Care Provider: Damari Cornejo PA-C   Referring Provider: Damari Cornejo PA-C    Visit Date: December 17, 2020    Provider: Akua Martinez MD   Location: [facility]   Location Address: [address] [place] [address]  [id number]   Location Phone: [phone]          Chief Complaint  · \"I have been having a lot of problems with my legs.\"      History Of Present Illness  Video Conferencing Visit  Isha Llanes is a 55 year old /White female who is presenting for evaluation via video conferencing via Zoom. Verbal consent obtained before beginning visit.   The following staff were present during this visit: Akua Martinez MD   Isha Llanes is a 55 year old /White female who presents for evaluation and treatment of:      Leg problems:   Patient with complex medical hx including Hodgkin's lymphoma, chronic pain for which she is on a Dilaudid pump, COPD, CHF, and type II DM. She is receiving home health.     Today patient reports :  hx of vasculitis. Home health care for wrapping, she gets sores on bilateral legs, however states that  now they are \"real bad\" and brought her to tears las night. No fevers, has had chills last night. Had abx, levoquin, from prior course at home which she took yesterday. Right leg is bigger than the left,            Past Medical History  Disease Name Date Onset Notes   Allergic rhinitis due to allergen 01/16/2020 Discussed OTC antihistamine for allergic sx prn.   Anticoagulation Medication Therapy --  --    Anxiety disorder 01/16/2020 Discussed anxiety and depression, pt declines SSRI, buspar, hydroxyzine. She states Xanax is only thing effective in past. discussed risks of benzo use including death, respiratory depression. Discussed in detail with pt that we will not rx controlled medication for " anxiety. She was given handout for local psychiatrist to call and schedule apt. to er if si/hi.    Arthritis 1/16/2020 Keep follow up with pain mgmt. Will refer to PT for stretches/strengthening. Discussed we will not rx pain meds from our office.   Asthma --  --    Back Pain  --  --    Bladder Disorder --  --    Bowel Disease --  --    Breast lump --  --    CAD (coronary artery disease) 09/16/2020 --    Cancer --  --    CHF (congestive heart failure) 01/16/2020 Will request note from cardiology, new referral placed today. requesting previous pcp records as well as cardiology records   Chronic Obstructive Pulmonary Disease --  No current sx. Encouraged smoking cessation   Chronic pain 01/16/2020 Keep follow up with pain mgmt. We will request their records.    COPD (chronic obstructive pulmonary disease) 01/16/2020 Encouraged smoking cessation. requesting previous records.   Depression --  Discussed med options. Pt declined. Given handout to call psych.   Diabetes --  --    Diabetes mellitus, type 2 01/16/2020 requesting recent labs. Pt has not been taking Januvia due to side effects. Will hold off starting new medication until we see recent a1c.   GERD (gastroesophageal reflux disease) --  Referred back to gi for updated EGD and colonoscopy.   Heart Disease --  --    Heart murmur 01/16/2020 --    Hepatic steatosis 09/16/2020 --    Hiatal hernia 09/16/2020 --    History of Hodgkin's lymphoma 01/16/2020 Requesting previous record, will refer back to oncology since pt has not had recent follow up.    Hyperlipemia --  --    Kidney Disease --  --    Kidney stones --  --    Knee pain 01/16/2020 Discussed knee pain, will refer to home health for PT. No meds since pt est with pain mgmt.   Limb Pain --  --    Limb Swelling --  --    Lupus (systemic lupus erythematosus) --  --    Mitral valve prolapse --  --    Neck pain --  --    Neurologic disorder --  --    Nicotine dependence 01/16/2020 encouraged smoking cessation, pt  declined.    Night sweats --  --    Nipple discharge --  --    Peripheral vascular disease 01/16/2020 Discussed PVD noted on patient legs. Cont compression/compression socks. Will refer home health to help with wound care of 2 small ulcers on back of L calf. Pt will rtc if sx worsen, fever, odor, exudate.    Presence of intrathecal pump 01/16/2020 Pt has pain pump    Pulmonary embolus --  --    Renal calculus or stone --  --    Renal stone 09/16/2020 --    Sinus trouble --  Use otc antihistamine.   Skin breakdown 01/16/2020 Keep lesions clean and dry. We will send home health to do wound care on posterior calf lesions   Ulcer --  --          Past Surgical History  Procedure Name Date Notes   Back --  --    Back surgery --  --    Bladder Repair --  Early 2000s   Cholecystectomy 2001 --    Colonoscopy --  --    Direct revascularization heart muscle with catheter stent, prosthesis, or vein graft 2019 --    Gallbladder --  --    heart surgery --  --    Hemorrhoidectomy --  --    Hernia --  --    Hysterectomy --  --    Hysterectomy-Abdominal 1996 --    Lymph Node Excision --  --    Pain Pump 2013, 2019 --    Partial Hysterectomy --  --    Port Placement --  --    Spinal Fusion 1998 --    Tonsillectomy --  --    Tumor removal --  between vaginal and rectal wall bladder-2000         Medication List  Name Date Started Instructions   Admelog SoloStar U-100 Insulin 100 unit/mL subcutaneous insulin pen  inject by subcutaneous route per prescriber's instructions. Insulin dosing requires individualization.   Blood Glucose Test miscellaneous strip 06/03/2020 use as directed to check blood sugar TID   Diflucan 150 mg oral tablet 12/14/2020 take 1 tablet (150 mg) by oral route once   furosemide 40 mg oral tablet 10/08/2020 take 1 tablet (40 mg) by oral route once daily for 90 days   gabapentin 300 mg oral capsule  take 1 capsule (300 mg) by oral route 3 times per day   lancets miscellaneous misc 06/03/2020 use as directed  "  ondansetron HCl 4 mg oral tablet 2020 take one tab by mouth every 8 hours as needed   Pain Pump-Dilaudid  --    Tresiba FlexTouch U-100 100 unit/mL (3 mL) subcutaneous insulin pen  inject by subcutaneous route as per insulin protocol   triamcinolone acetonide 0.1 % topical ointment 2020 apply a thin layer to the affected area(s) by topical route 3 times per day         Allergy List  Allergen Name Date Reaction Notes   amoxicillin --  SOA --    aspirin --  tinnitus --    CEPHALOSPORINS --  lip swelling --    clarithromycin --  unknown --    Compazine --  unknown --    Egg --  --  --    erythromycin --  --  --    IVP Dye --  \"bad reaction\" --    NSAIDS --  unknown --    PENICILLINS --  unknown --    Phenergan --  unknown --    rifampin --  hallucinations --    SULFA (SULFONAMIDES) --  unknown --        Allergies Reconciled  Family Medical History  Disease Name Relative/Age Notes   Heart Disease Daughter/  Father/  Mother/  Sister/   Mother; Father; Sister; Daughter   Cancer, Unspecified Father/  Mother/  Sister/   Mother; Father; Sister   Diabetes, unspecified type Daughter/  Father/  Mother/  Sister/   Mother; Father; Sister; Daughter  Mother   Spine Problems Mother/   --    Heart Attack (MI) Mother/   --    Renal Calculus Mother/   Mother   Family history of breast cancer Mother/   Mother   Renal Cancer Mother/   Mother  Mother/70s   -Father's Family History Unknown Father/   Father   Bladder calculus Grandmother (maternal)/  Mother/   Mother; Grandmother (maternal)   Blood Diseases Father/   Father   Osteoporosis Mother/  Sister/   Mother; Sister   Family history of Arthritis Brother/  Daughter/  Mother/   Mother; Brother; Daughter         Reproductive History  Menstrual   Menopause Status: Postmenopausal HRT?: No   Pregnancy Summary   Total Pregnancies: 0 Full Term: 0 Premature: 0   Ab Induced: 0 Ab Spontaneous: 0 Ectopics: 0   Multiples: 0 Livin         Social History  Finding Status " Start/Stop Quantity Notes   Alcohol Never --/-- --  drinks no   Alcohol Use Never --/-- --  does not drink   Caffeine Current - status unknown --/-- --  drinks coffee  drinks regularly; 3-4 times per day   Claustophobic Unknown --/-- --  yes   Disabled --  --/-- --  --     --  --/-- --  two- one    lives with children --  --/-- --  --    Recreational Drug Use Never --/-- --  no   Second hand smoke exposure Current some day --/-- --  yes   Tobacco Current every day --/-- 1.5 ppd smoked 31 or more years  started smoking at age 89; smoked 2 cigarette(s) per day  started smoking at age 1; smoked 2 cigarette(s) per day         Review of Systems  · Constitutional  o Denies  o : fever, fatigue, weight loss, weight gain  · Cardiovascular  o Admits  o : lower extremity edema  o Denies  o : claudication, chest pressure, palpitations  · Respiratory  o Denies  o : shortness of breath, wheezing, frequent cough, hemoptysis, dyspnea on exertion  · Gastrointestinal  o Denies  o : nausea, vomiting, diarrhea, constipation, abdominal pain  · Integument  o Admits  o : new skin lesions  · Musculoskeletal  o Admits  o : leg pain       Physical Examination     Limited since telehealth encounter and patient is not very good at angling the camera properly:  NAD  Sitting in motorized wheelchair/scooter? with basket  Bilateral LE swelling   Bilateral shiny chronic venous stasis noted over LE, multiple open wounds noted over the posterior aspect of her left leg.           Assessment  · Bilateral leg ulcer       Non-pressure chronic ulcer of unspecified part of right lower leg with unspecified severity     707.10/L97.919  Non-pressure chronic ulcer of unspecified part of left lower leg with unspecified severity     707.10/L97.929  Bilateral ulcers noted with left LE slightly worse than right. Exam is limited as described above. Difficult to ascertain if lesions are poorly healing chronic venous ulcers vs if they are actively  infected. Will err on the side of caution and prescribe abx. Pt with multiple allergies to several abx which were reviewed and confirmed w/ patient. abx sent to preferred pharmacy; informed pt that she will need to come in for direct visualization if lesions do not improve despite abx course.     Problems Reconciled  Plan  · Orders  o ACO-39: Current medications updated and reviewed (1159F, ) - 707.10/L97.919, 707.10/L97.929 - 12/17/2020  · Medications  o Medications have been Reconciled  o Transition of Care or Provider Policy  · Instructions  o Take all medications as prescribed/directed.  o Patient was educated/instructed on their diagnosis, treatment and medications prior to discharge from the clinic today.  o Patient counseled to reduce calorie intake.  o Patient was instructed to exercise regularly.  o Patient instructed to seek medical attention urgently for new or worsening symptoms.  o Call the office with any concerns or questions.  · Disposition  o Follow up 2 weeks.            Electronically Signed by: Akua Martinez MD -Author on February 16, 2021 12:40:19 PM

## 2021-05-14 NOTE — PROGRESS NOTES
Progress Note      Patient Name: Isha Llanes   Patient ID: [id number]   Sex: Female   YOB: 1965    Primary Care Provider: Damari Cornejo PA-C   Referring Provider: Akua Martinez MD    Visit Date: February 26, 2021    Provider: Damari Cornejo PA-C   Location: [facility]   Location Address: [address] [place] [address] 3  [place], [place]  [id number]   Location Phone: [phone]          Chief Complaint  · not feeling well      History Of Present Illness  Isha Llanes is a 55 year old /White female who presents for evaluation and treatment of:      not feeling well. numerous complaints.    Fungal pneumonia: Cough mostly resolved.   Denies resp distress  Feels sob with activity.   Pt has not followed up with pulm after discharge for fungal pneumonia. Encouraged to call and set up follow up since still having sob. Discussed sob could also be from cardiac causes, encouraged to keep apt with cardiology which she has scheduled for 3/15 per pt. Discussed CRITICAL AS. She states they have already discussed a procedure with her. To er if sx worsen.    States her tongue feels fuzzy. C/o dry mouth. She does not drink any water.    Venous status ulcers on bilateral legs  Seeing wound care every 2 wks.   She has had swelling in legs. Wounds have stopped draining.     Bg running 160s  Managed by endo  She is using insulin, states she has not been using recently because she has not been eating.  decrease in appetite. She has had this since her chemo, no worsening.     Abd pain: missed her apt with GI. she has not rescheduled.    LAD: lymph node in neck   She states PET scan with oncology in May  She states swelling in neck is how Hogkins started at first and is worried about waiting until May for imaging.   She denies issues swallowing. Refuses CT scan.       Past Medical History  Disease Name Date Onset Notes   Allergic rhinitis due to allergen 01/16/2020 Discussed OTC  antihistamine for allergic sx prn.   Anticoagulation Medication Therapy --  --    Anxiety disorder 01/16/2020 Discussed anxiety and depression, pt declines SSRI, buspar, hydroxyzine. She states Xanax is only thing effective in past. discussed risks of benzo use including death, respiratory depression. Discussed in detail with pt that we will not rx controlled medication for anxiety. She was given handout for local psychiatrist to call and schedule apt. to er if si/hi.    Aortic stenosis --  --    Aortic stenosis 01/21/2021 --    Arthritis 1/16/2020 Keep follow up with pain mgmt. Will refer to PT for stretches/strengthening. Discussed we will not rx pain meds from our office.   Asthma --  --    Back Pain  --  --    Bladder disorder --  --    Bowel Disease --  --    Breast lump --  --    CAD (coronary artery disease) 09/16/2020 --    Cancer --  --    CHF (congestive heart failure) 01/16/2020 Will request note from cardiology, new referral placed today. requesting previous pcp records as well as cardiology records   Chronic Obstructive Pulmonary Disease --  No current sx. Encouraged smoking cessation   Chronic pain 01/16/2020 Keep follow up with pain mgmt. We will request their records.    COPD (chronic obstructive pulmonary disease) 01/16/2020 Encouraged smoking cessation. requesting previous records.   Depression --  Discussed med options. Pt declined. Given handout to call psych.   Diabetes --  --    Diabetes mellitus, type 2 01/16/2020 requesting recent labs. Pt has not been taking Januvia due to side effects. Will hold off starting new medication until we see recent a1c.   GERD (gastroesophageal reflux disease) --  Referred back to gi for updated EGD and colonoscopy.   Heart Disease --  --    Heart murmur 01/16/2020 --    Hepatic steatosis 09/16/2020 --    Hiatal hernia 09/16/2020 --    History of Hodgkin's lymphoma 01/16/2020 Requesting previous record, will refer back to oncology since pt has not had recent  follow up.    Hyperlipemia --  --    Kidney Disease --  --    Kidney stones --  --    Knee pain 01/16/2020 Discussed knee pain, will refer to home health for PT. No meds since pt est with pain mgmt.   Limb Pain --  --    Limb Swelling --  --    Lupus (systemic lupus erythematosus) --  --    Mitral valve prolapse --  --    Neck pain --  --    Neurologic disorder --  --    Nicotine dependence 01/16/2020 encouraged smoking cessation, pt declined.    Night sweats --  --    Nipple discharge --  --    Peripheral vascular disease 01/16/2020 Discussed PVD noted on patient legs. Cont compression/compression socks. Will refer home health to help with wound care of 2 small ulcers on back of L calf. Pt will rtc if sx worsen, fever, odor, exudate.    Presence of intrathecal pump 01/16/2020 Pt has pain pump    Pulmonary embolus --  --    Renal calculus or stone --  --    Renal stone 09/16/2020 --    Sinus trouble --  Use otc antihistamine.   Skin breakdown 01/16/2020 Keep lesions clean and dry. We will send home health to do wound care on posterior calf lesions   Ulcer --  --          Past Surgical History  Procedure Name Date Notes   Back --  --    Back surgery --  --    Bladder Repair --  Early 2000s   Cholecystectomy 2001 --    Colonoscopy --  --    Direct revascularization heart muscle with catheter stent, prosthesis, or vein graft 2019 --    Gallbladder --  --    heart surgery --  --    Hemorrhoidectomy --  --    Hernia --  --    Hysterectomy --  --    Hysterectomy-Abdominal 1996 --    Lymph Node Excision --  --    Pain Pump 2013, 2019 --    Partial Hysterectomy --  --    Port Placement --  --    Spinal Fusion 1998 --    Tonsillectomy --  --    Tumor removal --  between vaginal and rectal wall bladder-2000         Medication List  Name Date Started Instructions   Admelog SoloStar U-100 Insulin 100 unit/mL subcutaneous insulin pen  inject by subcutaneous route per prescriber's instructions. Insulin dosing requires  "individualization.   Blood Glucose Test miscellaneous strip 06/03/2020 use as directed to check blood sugar TID   furosemide 40 mg oral tablet 10/08/2020 take 1 tablet (40 mg) by oral route once daily for 90 days   gabapentin 300 mg oral capsule  take 1 capsule (300 mg) by oral route 3 times per day   gentamicin 0.1 % topical ointment  apply a small amount to the affected area by topical route 2 times per day   lancets miscellaneous misc 06/03/2020 use as directed   Pain Pump-Dilaudid  --    Tresiba FlexTouch U-100 100 unit/mL (3 mL) subcutaneous insulin pen  inject by subcutaneous route as per insulin protocol   triamcinolone acetonide 0.1 % topical ointment 01/06/2021 apply a thin layer to the affected area(s) by topical route 3 times per day   Zofran 4 mg oral tablet 01/29/2021 take 1 tablet (4 mg) by oral route every 8 hours as needed for nausea         Allergy List  Allergen Name Date Reaction Notes   amoxicillin --  SOA --    aspirin --  tinnitus --    CEPHALOSPORINS --  lip swelling --    clarithromycin --  unknown --    Compazine --  unknown --    Egg --  --  --    erythromycin --  --  --    IVP Dye --  \"bad reaction\" --    NSAIDS --  unknown --    PENICILLINS --  unknown --    Phenergan --  unknown --    rifampin --  hallucinations --    SULFA (SULFONAMIDES) --  unknown --        Allergies Reconciled  Family Medical History  Disease Name Relative/Age Notes   Heart Disease Daughter/  Father/  Mother/  Sister/   Mother; Father; Sister; Daughter   Cancer, Unspecified Father/  Mother/  Sister/   Mother; Father; Sister   Diabetes, unspecified type Daughter/  Father/  Mother/  Sister/   Mother; Father; Sister; Daughter  Mother   Spine Problems Mother/   --    Heart Attack (MI) Mother/   --    Renal Calculus Mother/   Mother   Family history of breast cancer Mother/   Mother   Renal Cancer Mother/   Mother  Mother/70s   -Father's Family History Unknown Father/   Father   Bladder calculus Grandmother " "(maternal)/  Mother/   Mother; Grandmother (maternal)   Blood Diseases Father/   Father   Osteoporosis Mother/  Sister/   Mother; Sister   Family history of Arthritis Brother/  Daughter/  Mother/   Mother; Brother; Daughter         Reproductive History  Menstrual   Menopause Status: Postmenopausal HRT?: No   Pregnancy Summary   Total Pregnancies: 0 Full Term: 0 Premature: 0   Ab Induced: 0 Ab Spontaneous: 0 Ectopics: 0   Multiples: 0 Livin         Social History  Finding Status Start/Stop Quantity Notes   Alcohol Use Never --/-- --  does not drink   Caffeine Current - status unknown --/-- --  drinks coffee  drinks regularly; 3-4 times per day   Claustophobic Unknown --/-- --  yes   Disabled --  --/-- --  --     --  --/-- --  two- one    lives with children --  --/-- --  --    Recreational Drug Use Never --/-- --  no   Second hand smoke exposure Current some day --/-- --  yes   Tobacco Current every day --/-- 1.5 ppd smoked 31 or more years  started smoking at age 89; smoked 2 cigarette(s) per day  started smoking at age 1; smoked 2 cigarette(s) per day         Vitals  Date Time BP Position Site L\R Cuff Size HR RR TEMP (F) WT  HT  BMI kg/m2 BSA m2 O2 Sat FR L/min FiO2 HC       2020 01:04 PM         174lbs 0oz 5'  4\" 29.87 1.89       2020 01:50 /64 Sitting    90 - R 15 98.2 173lbs 4oz    94 %      2021 08:13 /80 Sitting    96 - R 16 98.2 168lbs 0oz 5'  4\" 28.84 1.85 95 %      2021 11:42 /80 Sitting    94 - R 16 98.3 180lbs 0oz 5'  4\" 30.9 1.92 93 %            Physical Examination  · Constitutional  o Appearance  o : no acute distress, well-nourished  · Head and Face  o Head  o :   § Inspection  § : atraumatic, normocephalic  · Eyes  o Eyes  o : extraocular movements intact, no scleral icterus, no conjunctival injection  · Ears, Nose, Mouth and Throat  o Ears  o :   § External Ears  § : normal  o Nose  o :   § Intranasal Exam  § : nares patent  o Oral " Cavity  o :   § Oral Mucosa  § : moist mucous membranes  · Neck  o Thyroid  o : gland size normal, nontender, no nodules or masses present on palpation, symmetric  · Respiratory  o Respiratory Effort  o : breathing comfortably, symmetric chest rise  o Auscultation of Lungs  o : clear to asculatation bilaterally, no wheezes, rales, or rhonchii  · Cardiovascular  o Heart  o :   § Auscultation of Heart  § : regular rate and rhythm, no murmurs, rubs, or gallops  o Peripheral Vascular System  o :   § Extremities  § : no edema  · Gastrointestinal  o Abdominal Examination  o :   § Abdomen  § : bowel sounds present, non-distended, non-tender  · Lymphatic  o Neck  o : lymphadenopathy present   o Supraclavicular Nodes  o : no supraclavicular nodes  · Skin and Subcutaneous Tissue  o General Inspection  o : chronic venous stasis bilateral LE, appears much better than pt baseline. No draining lesions noted.  · Neurologic  o Mental Status Examination  o :   § Orientation  § : grossly oriented to person, place and time  o Gait and Station  o :   § Gait Screening  § : normal gait  · Psychiatric  o General  o : normal mood and affect              Assessment  · Abdominal pain     789.00/R10.9  Encouraged pt to re-schedule her missed apt with GI  · Diabetes mellitus, type 2     250.00/E11.9  Encouraged med compliance  · Nicotine dependence     305.1/F17.200  encouraged smoking cessation   · Aortic stenosis     424.1/I35.0  Discussed importance of keeping upcoming apt with cardiology for eval and discussed likely need for surgical intervention  · Chronic Obstructive Pulmonary Disease     491.21  · History of Hodgkin's lymphoma     V10.72/Z85.71  Will get US lad  · Shortness of breath     786.05/R06.02  Discussed ddx sob pulm vs cardiac causes. Encouraged to reschedule follow up with pulmonology for eval after fungal pneumonia. Although pt was unable to take antifungal as directed, recent sputum culture recent was WNL. To er if sx  worsen, resp distress, cp.  · Decreased appetite     783.0/R63.0  · Lymphadenopathy of head and neck     785.6/R59.1  Us of neck today to eval. Requesting records again from oncology  · Fungal pneumonia       Unspecified mycosis     117.9/B49  Pneumonia in diseases classified elsewhere     117.9/J17  · Venous stasis     459.81/I87.8  Cont follow up with wound care, legs look much better on exam today      Plan  · Orders  o ACO-17: Screened for tobacco use AND received tobacco cessation intervention (4004F) - 305.1/F17.200 - 02/26/2021  o ACO-39: Current medications updated and reviewed (, 1159F) - - 02/26/2021  o U/S Neck (47725) - 783.0/R63.0, 785.6/R59.1, V10.72/Z85.71 - 02/26/2021   SOFT TISSUE NECK  o PULMONARY CONSULTATION (PULMO) - 786.05/R06.02, V10.72/Z85.71, 491.21, 117.9/J17 - 02/26/2021  · Medications  o Anusol-HC 2.5 % topical cream with perineal applicator   SIG: apply a thin layer to the affected area(s) by topical route 2-4 times daily   DISP: (1) Tube with 0 refills  Discontinued on 02/26/2021     o Medications have been Reconciled  o Transition of Care or Provider Policy  · Instructions  o Instructed to seek medical attention urgently for new or worsening symptoms.  o *Form of nicotine being used: cigarettes  o Patient was strongly encouraged to discontinue use of any nicotine containing product or minimize the use of the product.  o Handouts were given to patient: us  o Patient was educated/instructed on their diagnosis, treatment and medications prior to discharge from the clinic today.  o Electronically Identified Patient Education Materials Provided Electronically  · Disposition  o Call or Return if symptoms worsen or persist.  o Follow up in 1 month  o Referral ordered  o Order placed for imaging            Electronically Signed by: Damari Cornejo PA-C -Author on February 26, 2021 04:06:56 PM

## 2021-05-14 NOTE — PROGRESS NOTES
"   Progress Note      Patient Name: Isha Llanes   Patient ID: [id number]   Sex: Female   YOB: 1965    Primary Care Provider: Damari Cornejo PA-C   Referring Provider: Damari Cornejo PA-C    Visit Date: January 5, 2021    Provider: Akua Martinez MD   Location: [facility]   Location Address: [address] [place] [address] 3  [place], [place]  [id number]   Location Phone: [phone]          Chief Complaint  · \"Following up on my leg pain.\"      History Of Present Illness  Video Conferencing Visit  Isha Llanes is a 55 year old /White female who is presenting for evaluation via video conferencing via Zoom. Verbal consent obtained before beginning visit.   The following staff were present during this visit: Akua Martinez MD   Isha Llanes is a 55 year old /White female who presents for evaluation and treatment of:      Lupus:   She follows with local rheumatologist. She is requesting referral dermatology for evaluation of her recurrent LE ulcers with concerns that they may be secondary to discoid lupus instead of the sequalae of chronic venous congestion.     Bilateral LE ulcers:   Ulcers healing, but sensitive to the touch, has not seen ankles in over 2 yrs but states that she is now  able to see them. She recently had an apt with Vascular, however document is not available for review at time of visit.     Intranasal mass:   Pt states she has a hx of nasal mass and would like referral to ENT for further evaluation.       Past Medical History  Disease Name Date Onset Notes   Allergic rhinitis due to allergen 01/16/2020 Discussed OTC antihistamine for allergic sx prn.   Anticoagulation Medication Therapy --  --    Anxiety disorder 01/16/2020 Discussed anxiety and depression, pt declines SSRI, buspar, hydroxyzine. She states Xanax is only thing effective in past. discussed risks of benzo use including death, respiratory depression. Discussed in " detail with pt that we will not rx controlled medication for anxiety. She was given handout for local psychiatrist to call and schedule apt. to er if si/hi.    Arthritis 1/16/2020 Keep follow up with pain mgmt. Will refer to PT for stretches/strengthening. Discussed we will not rx pain meds from our office.   Asthma --  --    Back Pain  --  --    Bladder Disorder --  --    Bowel Disease --  --    Breast lump --  --    CAD (coronary artery disease) 09/16/2020 --    Cancer --  --    CHF (congestive heart failure) 01/16/2020 Will request note from cardiology, new referral placed today. requesting previous pcp records as well as cardiology records   Chronic Obstructive Pulmonary Disease --  No current sx. Encouraged smoking cessation   Chronic pain 01/16/2020 Keep follow up with pain mgmt. We will request their records.    COPD (chronic obstructive pulmonary disease) 01/16/2020 Encouraged smoking cessation. requesting previous records.   Depression --  Discussed med options. Pt declined. Given handout to call psych.   Diabetes --  --    Diabetes mellitus, type 2 01/16/2020 requesting recent labs. Pt has not been taking Januvia due to side effects. Will hold off starting new medication until we see recent a1c.   GERD (gastroesophageal reflux disease) --  Referred back to gi for updated EGD and colonoscopy.   Heart Disease --  --    Heart murmur 01/16/2020 --    Hepatic steatosis 09/16/2020 --    Hiatal hernia 09/16/2020 --    History of Hodgkin's lymphoma 01/16/2020 Requesting previous record, will refer back to oncology since pt has not had recent follow up.    Hyperlipemia --  --    Kidney Disease --  --    Kidney stones --  --    Knee pain 01/16/2020 Discussed knee pain, will refer to home health for PT. No meds since pt est with pain mgmt.   Limb Pain --  --    Limb Swelling --  --    Lupus (systemic lupus erythematosus) --  --    Mitral valve prolapse --  --    Neck pain --  --    Neurologic disorder --  --     Nicotine dependence 01/16/2020 encouraged smoking cessation, pt declined.    Night sweats --  --    Nipple discharge --  --    Peripheral vascular disease 01/16/2020 Discussed PVD noted on patient legs. Cont compression/compression socks. Will refer home health to help with wound care of 2 small ulcers on back of L calf. Pt will rtc if sx worsen, fever, odor, exudate.    Presence of intrathecal pump 01/16/2020 Pt has pain pump    Pulmonary embolus --  --    Renal calculus or stone --  --    Renal stone 09/16/2020 --    Sinus trouble --  Use otc antihistamine.   Skin breakdown 01/16/2020 Keep lesions clean and dry. We will send home health to do wound care on posterior calf lesions   Ulcer --  --          Past Surgical History  Procedure Name Date Notes   Back --  --    Back surgery --  --    Bladder Repair --  Early 2000s   Cholecystectomy 2001 --    Colonoscopy --  --    Direct revascularization heart muscle with catheter stent, prosthesis, or vein graft 2019 --    Gallbladder --  --    heart surgery --  --    Hemorrhoidectomy --  --    Hernia --  --    Hysterectomy --  --    Hysterectomy-Abdominal 1996 --    Lymph Node Excision --  --    Pain Pump 2013, 2019 --    Partial Hysterectomy --  --    Port Placement --  --    Spinal Fusion 1998 --    Tonsillectomy --  --    Tumor removal --  between vaginal and rectal wall bladder-2000         Medication List  Name Date Started Instructions   Admelog SoloStar U-100 Insulin 100 unit/mL subcutaneous insulin pen  inject by subcutaneous route per prescriber's instructions. Insulin dosing requires individualization.   Blood Glucose Test miscellaneous strip 06/03/2020 use as directed to check blood sugar TID   Diflucan 150 mg oral tablet 12/14/2020 take 1 tablet (150 mg) by oral route once   furosemide 40 mg oral tablet 10/08/2020 take 1 tablet (40 mg) by oral route once daily for 90 days   gabapentin 300 mg oral capsule  take 1 capsule (300 mg) by oral route 3 times per day  "  lancets miscellaneous misc 06/03/2020 use as directed   ondansetron HCl 4 mg oral tablet 09/16/2020 take one tab by mouth every 8 hours as needed   Pain Pump-Dilaudid  --    Tresiba FlexTouch U-100 100 unit/mL (3 mL) subcutaneous insulin pen  inject by subcutaneous route as per insulin protocol   triamcinolone acetonide 0.1 % topical ointment 12/09/2020 apply a thin layer to the affected area(s) by topical route 3 times per day         Allergy List  Allergen Name Date Reaction Notes   amoxicillin --  SOA --    aspirin --  tinnitus --    CEPHALOSPORINS --  lip swelling --    clarithromycin --  unknown --    Compazine --  unknown --    Egg --  --  --    erythromycin --  --  --    IVP Dye --  \"bad reaction\" --    NSAIDS --  unknown --    PENICILLINS --  unknown --    Phenergan --  unknown --    rifampin --  hallucinations --    SULFA (SULFONAMIDES) --  unknown --        Allergies Reconciled  Family Medical History  Disease Name Relative/Age Notes   Heart Disease Daughter/  Father/  Mother/  Sister/   Mother; Father; Sister; Daughter   Cancer, Unspecified Father/  Mother/  Sister/   Mother; Father; Sister   Diabetes, unspecified type Daughter/  Father/  Mother/  Sister/   Mother; Father; Sister; Daughter  Mother   Spine Problems Mother/   --    Heart Attack (MI) Mother/   --    Renal Calculus Mother/   Mother   Family history of breast cancer Mother/   Mother   Renal Cancer Mother/   Mother  Mother/70s   -Father's Family History Unknown Father/   Father   Bladder calculus Grandmother (maternal)/  Mother/   Mother; Grandmother (maternal)   Blood Diseases Father/   Father   Osteoporosis Mother/  Sister/   Mother; Sister   Family history of Arthritis Brother/  Daughter/  Mother/   Mother; Brother; Daughter         Reproductive History  Menstrual   Menopause Status: Postmenopausal HRT?: No   Pregnancy Summary   Total Pregnancies: 0 Full Term: 0 Premature: 0   Ab Induced: 0 Ab Spontaneous: 0 Ectopics: 0   Multiples: 0 " Livin         Social History  Finding Status Start/Stop Quantity Notes   Alcohol Never --/-- --  drinks no   Alcohol Use Never --/-- --  does not drink   Caffeine Current - status unknown --/-- --  drinks coffee  drinks regularly; 3-4 times per day   Claustophobic Unknown --/-- --  yes   Disabled --  --/-- --  --     --  --/-- --  two- one    lives with children --  --/-- --  --    Recreational Drug Use Never --/-- --  no   Second hand smoke exposure Current some day --/-- --  yes   Tobacco Current every day --/-- 1.5 ppd smoked 31 or more years  started smoking at age 89; smoked 2 cigarette(s) per day  started smoking at age 1; smoked 2 cigarette(s) per day         Review of Systems  · Constitutional  o Denies  o : fever, fatigue, weight loss, weight gain  · Cardiovascular  o Admits  o : lower extremity edema  o Denies  o : claudication, chest pressure, palpitations  · Respiratory  o Admits  o : shortness of breath-chronic and at baseline  o Denies  o : wheezing, frequent cough, hemoptysis, dyspnea on exertion  · Gastrointestinal  o Denies  o : nausea, vomiting, diarrhea, constipation, abdominal pain      Physical Examination     n/a   Visit occurred via zoom           Assessment  · Limb Swelling     729.81/M79.89  Bilateral leg swelling and ulcers with significant improvement per patient.  · Lupus     710.0/M32.9  Patient with history of lupus for which she follows with rheumatology. She has chronic bilateral venous stasis with recurrent ulcers. Her ulcers were first evaluated by this provider via Zoom a few weeks ago. Neither her legs nor her ulcers have ever been directly evaluated by this provider. Patient at this time is requesting referral to dermatology for concerned that her recurrent ulcers and poor healing may be related to her underlying lupus.  · Intranasal mass     784.2/R22.0  Patient reports history of intranasal mass for which she is requesting referral to ENT for further  evaluation. Nasal mass was not assessed by this provider as visit occurred via Zoom.    Problems Reconciled  Plan  · Orders  o ACO-39: Current medications updated and reviewed (1159F, ) - 710.0/M32.9, 784.2/R22.0 - 01/05/2021  o DERMATOLOGY CONSULTATION (DERMA) - 710.0/M32.9 - 01/05/2021  o ENT CONSULTATION ([facility]) - 784.2/R22.0 - 01/05/2021  · Medications  o Medications have been Reconciled  o Transition of Care or Provider Policy  · Instructions  o Patient was educated/instructed on their diagnosis, treatment and medications prior to discharge from the clinic today.  · Disposition  o Call or Return if symptoms worsen or persist.            Electronically Signed by: Akua Martinez MD -Author on February 5, 2021 07:57:36 AM

## 2021-05-14 NOTE — PROGRESS NOTES
Progress Note      Patient Name: Isha Llanes   Patient ID: [id number]   Sex: Female   YOB: 1965    Primary Care Provider: Damari Cornejo PA-C   Referring Provider: Damari Cornejo PA-C    Visit Date: April 7, 2021    Provider: Damari Cornejo PA-C   Location: [facility]   Location Address: [address] [place] [address], [address]  [place], [place]  [id number]   Location Phone: [phone]          Chief Complaint  · fever   · cough       History Of Present Illness  Isha Llanes is a 55 year old /White female who presents for evaluation and treatment of:      fevers, cough, shortness of breath, fatigue admitted to [facility] on 4/3/2021, pt left AMA    Went to the emergency room 4/3 for fever, sob, lethargy. patient was hypoxic requiring 6 L of nasal cannula to keep oxygen over 90%.  Fever 102 rectally in the ER.  White blood cells normal, CRP 7.2 elevated.  BNP over 1800.  D-dimer normal.  Covid not detected.  Blood cultures obtained.  Chest x-ray showed reticular prominence similar to previous study.  CT of chest abdomen pelvis shows groundglass changes of the right lower lobe, hepatic steatosis.  Patient was started on IV fluids, pain, Levaquin.  KG within normal limits patient was admitted for sepsis and immunity acquired pneumonia with acute hypoxic respiratory failure and acute metabolic encephalopathy. Zyvox was stopped wound MRSA was negative.  Patient was given Brovana, Pulmicort, and scheduled DuoNebs.  Patient was told to follow-up with cardiothoracic surgery for aortic valve replacement at discharge.  Patient left AMA 4/4.    Pt states she just wants to be treated at home and that she will not go back to the hospital.    Pt states she is coughing a lot since she got home.   She is coughing up blood at times due to irritation in throat. She has mucus and phlegm coming up as well.   She feels sob when excessively coughing.   Also feels sob when walking.  Pt smoking 2PPD.  She does not want to quit.  O2 drops to 86 when taking 2 stops. She is mostly in wheelchair.  Denies wheezing.     Pt has severe COPD lung disease as well as chronic respiratory failure and hypoxia. Oxygen is expected to improve symptoms as it improved symptoms while she was inpatient.   She has tried nebulizers and will continue to be on nebulizers but they have failed to control symptoms, needs o2.  Pt is mobile within her home.    She is supposed to have aortic valve repaired 4/29.       Past Medical History  Disease Name Date Onset Notes   Allergic rhinitis due to allergen 01/16/2020 Discussed OTC antihistamine for allergic sx prn.   Anticoagulation Medication Therapy --  --    Anxiety disorder 01/16/2020 Discussed anxiety and depression, pt declines SSRI, buspar, hydroxyzine. She states Xanax is only thing effective in past. discussed risks of benzo use including death, respiratory depression. Discussed in detail with pt that we will not rx controlled medication for anxiety. She was given handout for local psychiatrist to call and schedule apt. to er if si/hi.    Aortic stenosis --  --    Aortic stenosis 01/21/2021 --    Arthritis 1/16/2020 Keep follow up with pain mgmt. Will refer to PT for stretches/strengthening. Discussed we will not rx pain meds from our office.   Asthma --  --    Back Pain  --  --    Bladder disorder --  --    Bowel Disease --  --    Breast lump --  --    CAD (coronary artery disease) 09/16/2020 --    Cancer --  --    CHF (congestive heart failure) 01/16/2020 Will request note from cardiology, new referral placed today. requesting previous pcp records as well as cardiology records   Chronic Obstructive Pulmonary Disease --  No current sx. Encouraged smoking cessation   Chronic pain 01/16/2020 Keep follow up with pain mgmt. We will request their records.    COPD (chronic obstructive pulmonary disease) 01/16/2020 Encouraged smoking cessation. requesting previous records.   Depression --   Discussed med options. Pt declined. Given handout to call psych.   Diabetes --  --    Diabetes mellitus, type 2 01/16/2020 requesting recent labs. Pt has not been taking Januvia due to side effects. Will hold off starting new medication until we see recent a1c.   GERD (gastroesophageal reflux disease) --  Referred back to gi for updated EGD and colonoscopy.   Heart Disease --  --    Heart murmur 01/16/2020 --    Hepatic steatosis 09/16/2020 --    Hiatal hernia 09/16/2020 --    History of Hodgkin's lymphoma 01/16/2020 Requesting previous record, will refer back to oncology since pt has not had recent follow up.    Hyperlipemia --  --    Kidney Disease --  --    Kidney Stones --  --    Knee pain 01/16/2020 Discussed knee pain, will refer to home health for PT. No meds since pt est with pain mgmt.   Limb Pain --  --    Limb Swelling --  --    Lupus (systemic lupus erythematosus) --  --    Mitral valve prolapse --  --    Neck pain --  --    Neurologic disorder --  --    Nicotine dependence 01/16/2020 encouraged smoking cessation, pt declined.    Night sweats --  --    Nipple discharge --  --    Peripheral vascular disease 01/16/2020 Discussed PVD noted on patient legs. Cont compression/compression socks. Will refer home health to help with wound care of 2 small ulcers on back of L calf. Pt will rtc if sx worsen, fever, odor, exudate.    Presence of intrathecal pump 01/16/2020 Pt has pain pump    Pulmonary embolus --  --    Renal calculus or stone --  --    Renal stone 09/16/2020 --    Sinus trouble --  Use otc antihistamine.   Skin breakdown 01/16/2020 Keep lesions clean and dry. We will send home health to do wound care on posterior calf lesions   Ulcer --  --          Past Surgical History  Procedure Name Date Notes   Back --  --    Back surgery --  --    Bladder Repair --  Early 2000s   Cholecystectomy 2001 --    Colonoscopy --  --    Direct revascularization heart muscle with catheter stent, prosthesis, or vein  "graft 2019 --    Gallbladder --  --    heart surgery --  --    Hemorrhoidectomy --  --    Hernia --  --    Hysterectomy --  --    Hysterectomy-Abdominal 1996 --    Lymph Node Excision --  --    Pain Pump 2013, 2019 --    Partial Hysterectomy --  --    Port Placement --  --    Spinal Fusion 1998 --    Tonsillectomy --  --    Tumor removal --  between vaginal and rectal wall bladder-2000         Medication List  Name Date Started Instructions   Admelog SoloStar U-100 Insulin 100 unit/mL subcutaneous insulin pen  inject by subcutaneous route per prescriber's instructions. Insulin dosing requires individualization.   Blood Glucose Test miscellaneous strip 06/03/2020 use as directed to check blood sugar TID   furosemide 40 mg oral tablet 10/08/2020 take 1 tablet (40 mg) by oral route once daily for 90 days   gabapentin 300 mg oral capsule  take 1 capsule (300 mg) by oral route 3 times per day   gentamicin 0.1 % topical ointment  apply a small amount to the affected area by topical route 2 times per day   lancets miscellaneous misc 06/03/2020 use as directed   nystatin 100,000 unit/gram topical powder 03/20/2021 apply to the affected area(s) by topical route 2 times per day   Pain Pump-Dilaudid  --    Tresiba FlexTouch U-100 100 unit/mL (3 mL) subcutaneous insulin pen  inject by subcutaneous route as per insulin protocol   triamcinolone acetonide 0.1 % topical ointment 01/06/2021 apply a thin layer to the affected area(s) by topical route 3 times per day         Allergy List  Allergen Name Date Reaction Notes   amoxicillin --  SOA --    aspirin --  tinnitus --    CEPHALOSPORINS --  lip swelling --    clarithromycin --  unknown --    Compazine --  unknown --    Egg --  --  --    erythromycin --  --  --    IVP Dye --  \"bad reaction\" --    NSAIDS --  unknown --    PENICILLINS --  unknown --    Phenergan --  unknown --    rifampin --  hallucinations --    SULFA (SULFONAMIDES) --  unknown --        Allergies Reconciled  Family " "Medical History  Disease Name Relative/Age Notes   Heart Disease Daughter/  Father/  Mother/  Sister/   Mother; Father; Sister; Daughter   Cancer, Unspecified Father/  Mother/  Sister/   Mother; Father; Sister   Diabetes, unspecified type Daughter/  Father/  Mother/  Sister/   Mother; Father; Sister; Daughter  Mother   Spine Problems Mother/   --    Heart Attack (MI) Mother/   --    Renal Calculus Mother/   Mother   Family history of breast cancer Mother/   Mother   Renal Cancer Mother/   Mother  Mother/70s   -Father's Family History Unknown Father/   Father   Bladder calculus Grandmother (maternal)/  Mother/   Mother; Grandmother (maternal)   Blood Diseases Father/   Father   Osteoporosis Mother/  Sister/   Mother; Sister   Family history of Arthritis Brother/  Daughter/  Mother/   Mother; Brother; Daughter         Reproductive History  Menstrual   Menopause Status: Postmenopausal HRT?: No   Pregnancy Summary   Total Pregnancies: 0 Full Term: 0 Premature: 0   Ab Induced: 0 Ab Spontaneous: 0 Ectopics: 0   Multiples: 0 Livin         Social History  Finding Status Start/Stop Quantity Notes   Alcohol Use Never --/-- --  does not drink   Caffeine Current - status unknown --/-- --  drinks coffee  drinks regularly; 3-4 times per day   Claustophobic Unknown --/-- --  yes   Disabled --  --/-- --  --     --  --/-- --  two- one    lives with children --  --/-- --  --    Recreational Drug Use Never --/-- --  no   Second hand smoke exposure Current some day --/-- --  yes   Tobacco Current every day --/-- 2 ppd --          Vitals  Date Time BP Position Site L\R Cuff Size HR RR TEMP (F) WT  HT  BMI kg/m2 BSA m2 O2 Sat FR L/min FiO2 HC       2021 08:13 /80 Sitting    96 - R 16 98.2 168lbs 0oz 5'  4\" 28.84 1.85 95 %      2021 11:42 /80 Sitting    94 - R 16 98.3 180lbs 0oz 5'  4\" 30.9 1.92 93 %      2021 11:32 /84 Sitting    102 - R 15 98.2 180lbs 0oz 5'  4\" 30.9 1.92 91 %   "    04/07/2021 11:52 AM             86 %            Physical Examination  · Constitutional  o Appearance  o : no acute distress, well-nourished  · Head and Face  o Head  o :   § Inspection  § : atraumatic, normocephalic  · Eyes  o Eyes  o : extraocular movements intact, no scleral icterus, no conjunctival injection  · Ears, Nose, Mouth and Throat  o Ears  o :   § External Ears  § : normal  § Otoscopic Examination  § : tympanic membrane appearance within normal limits bilaterally  o Nose  o :   § Intranasal Exam  § : nares patent  o Oral Cavity  o :   § Oral Mucosa  § : moist mucous membranes  o Throat  o :   § Oropharynx  § : no inflammation or lesions present, tonsils within normal limits  · Neck  o Thyroid  o : gland size normal, nontender, no nodules or masses present on palpation, symmetric  · Respiratory  o Respiratory Effort  o : breathing comfortably, symmetric chest rise  o Auscultation of Lungs  o : decreased lung sounds bilaterally, no wheezes, rales, or rhonchii  · Cardiovascular  o Heart  o :   § Auscultation of Heart  § : regular rate and rhythm, no murmurs, rubs, or gallops  o Peripheral Vascular System  o :   § Extremities  § : no edema  · Gastrointestinal  o Abdominal Examination  o :   § Abdomen  § : bowel sounds present, non-distended, non-tender  · Lymphatic  o Neck  o : no lymphadenopathy present  o Supraclavicular Nodes  o : no supraclavicular nodes  · Skin and Subcutaneous Tissue  o General Inspection  o : chronic venous stasis bilateral lower legs  · Neurologic  o Mental Status Examination  o :   § Orientation  § : grossly oriented to person, place and time  o Gait and Station  o :   § Gait Screening  § : wheelchair   · Psychiatric  o General  o : normal mood and affect              Assessment  · Cough     786.2/R05  · Fatigue     780.79/R53.83  · Fever     780.60/R50.9  · Sepsis       Sepsis, unspecified organism     038.9/A41.9  · Community acquired pneumonia     486/J18.9  Reviewed discharge  summary. CBC and sputum culture WNL. Discussed pt with Dr. Pena, no further abx indicated at this time, especially with pt allergies to medications. Encouraged pt to see pulmonology as scheduled.  · Aortic stenosis     424.1/I35.0  Discussed CRITICAL AS, this is life threatening if not treated. Discussed need to get health in order to have surgery scheduled for the end of the month. Encouraged pt to go to ER today to be re-admitted, she refused.  · Chronic Obstructive Pulmonary Disease     491.21  Encouraged smoking cessation, pt declined. Discussed sx are only going to worsen if pt does not quit smoking including repeat respiratory failure or death.  · Acute metabolic encephalopathy     348.31/G93.41  Discussed mental status changes likely secondary to hypoxia. Discussed concerns for recurrent mental status change and possible long term lasting effects if not tx in time. Encouraged re hospitalization but pt refused.  · Respiratory failure     518.81/J96.90  · Noncompliance     V15.81/Z91.19  · Hypoxia     799.02/R09.02  Discussed my recommendation is to go to ER to be re-admitted for evaluation of respiratory failure. Pt refuses. She states she will not go to ER. She wants anything that can be done at home done and states if she gets bad enough she will call 911. Will get o2 at home to wear for hypoxia. O2 dropping to 86 with 2 steps. Given nebulizer to start medications she was using inpatient that helped sob and use duoneb prn.      Plan  · Orders  o ACO-39: Current medications updated and reviewed (, 1159F) - - 04/07/2021  · Medications  o Pulmicort 0.5 mg/2 mL inhalation suspension for nebulization   SIG: inhale 2 milliliters (0.5 mg) by nebulization route 2 times per day   DISP: (60) Vial with 1 refills  Prescribed on 04/07/2021     o Brovana 15 mcg/2 mL inhalation solution for nebulization   SIG: inhale 2 milliliters (15 mcg) by inhalation route 2 times per day   DISP: (120) Milliliter with 2  refills  Prescribed on 04/07/2021     o ipratropium-albuterol 0.5 mg-3 mg(2.5 mg base)/3 mL inhalation solution for nebulization   SIG: inhale 3 milliliters by nebulization route 4 times per day and as needed, up to 6 doses per day   DISP: (60) Vial with 0 refills  Prescribed on 04/07/2021     o Zofran 4 mg oral tablet   SIG: take 1 tablet (4 mg) by oral route every 8 hours as needed for nausea   DISP: (20) Tablet with 0 refills  Discontinued on 04/07/2021     o Medications have been Reconciled  o Transition of Care or Provider Policy  · Instructions  o Handouts were given to patient: oxygen information  o Patient was educated/instructed on their diagnosis, treatment and medications prior to discharge from the clinic today.  o Electronically Identified Patient Education Materials Provided Electronically  · Disposition  o Call or Return if symptoms worsen or persist.  o Follow up in 1 week  o Prescriptions sent electronically            Electronically Signed by: Damari Cornejo PA-C -Author on April 9, 2021 02:11:45 PM

## 2021-05-14 NOTE — PROGRESS NOTES
Progress Note      Patient Name: Isha Llanes   Patient ID: [id number]   Sex: Female   YOB: 1965    Primary Care Provider: Damari Cornejo PA-C   Referring Provider: Damari Cornejo PA-C    Visit Date: January 21, 2021    Provider: Damari Cornejo PA-C   Location: [facility]   Location Address: [address] [place] [address]  [id number]   Location Phone: [phone]          Chief Complaint  · Follow up office visit within 7 calendar days of discharge from inpatient status (high complexity).      History Of Present Illness  FOLLOW UP OFFICE VISIT WITHIN 7 CALENDAR DAYS OF INPATIENT STATUS (SEVERE COMPLEXITY)  Isha Llanes presents to office for follow up post discharge from inpatient status within 7 calendar days. Patient was contacted within 2 business days via phone conversation. Documentation of that phone contact is present in the patient's electronic chart. Patient was admitted to an inpatient faciliity on 01/17/2021 and discharged on 01/19/2021 due to: sepsis, pna   Admitting MD: Karen Griffith MD   Her discharge summary has been reviewed and placed in the patient's electronic chart.   Patient's problem list is: Allergic rhinitis due to allergen, Anticoagulation Medication Therapy, Anxiety disorder, Arthritis, Asthma, Back Pain , Breast lump, CAD (coronary artery disease), Cancer, CHF (congestive heart failure), Chronic Obstructive Pulmonary Disease, Chronic pain, COPD (chronic obstructive pulmonary disease), Depression, Diabetes mellitus, type 2, GERD (gastroesophageal reflux disease), Heart murmur, Hepatic steatosis, Hiatal hernia, History of Hodgkin's lymphoma, Kidney stones, Limb Pain, Limb Swelling, Mitral valve prolapse, Neck pain, Nicotine dependence, Night sweats, Nipple discharge, Peripheral vascular disease, Presence of intrathecal pump, Pulmonary embolus, Renal calculus or stone, Renal stone, and Sinus trouble   Patient's outpatient  medication list has been reconciled with the medication list from the discharge summary and has been reviewed with the patient. Current medication list is: Admelog SoloStar U-100 Insulin 100 unit/mL subcutaneous insulin pen, Blood Glucose Test miscellaneous strip, furosemide 40 mg oral tablet, gabapentin 300 mg oral capsule, gentamicin 0.1 % topical ointment, lancets miscellaneous misc, Pain Pump-Dilaudid, prednisone 20 mg oral tablet, Tresiba FlexTouch U-100 100 unit/mL (3 mL) subcutaneous insulin pen, triamcinolone acetonide 0.1 % topical ointment, and Zyvox 600 mg oral tablet      Pt here for follow up after hospital discharge. She went to the ER at [facility] 1/17 for cough, sob, fever, cp and was inpt for pneumonia and sepsis.   Echo showed EF of 60 to 65%, moderately dilated left atrium, moderate to severe mitral annular calcification with thickened mitral leaflets, mild to moderate mitral stenosis.  Mild mitral regurg.  Critical aortic stenosis.  Mild aortic regurg.  Mild to moderate tricuspid regurg.  Chest x-ray showed bibasilar opacities.  CT abdomen pelvis showed no acute abnormalities, hepatic steatosis, splenomegaly, nonobstructing right nephrolithiasis, right lower lobe airspace disease.  CT of chest showed interlobular septal thickening throughout both lungs and small bilateral pleural effusions related to mild pulmonary edema.  Groundglass airspace disease in both lower lobes.  Ultrasound of lower extremities showed no significant aorta iliac or proximal femoral occlusive disease, minimal left and right infra inguinal level occlusive disease, vessel stiffness at tibial level    Mild inpatient she was put on Merrem and linezolid due to her allergies.  Negative for COVID-19.  Tick panel negative.  She was told to follow-up with cardiology due to worsening echo.  Per discharge summary she demanded discharge 119 and asked for steroids and Xanax.    Since discharge she says she is feeling better.  She still has  some chest congestion but no further fever or chest pain. Pt was discharged with gentamycin topical she has been putting on her legs, Linezolid (pt unable to pay for rx since not covered by insurance), Prednisone which she finished.    Wounds on bilateral lower legs. She is seeing wound care   She has drainage.  One lesion is on R shin is down to bone.     Pt states she has had flare of anxiety. She would like Xanax. She has upcoming apt with [facility] discussed prescription.  She does not want antidepressants.    Has upcoming apt with Rheum for lupus    Has upcoming appointment with cardiologist in [place].       Past Medical History  Disease Name Date Onset Notes   Allergic rhinitis due to allergen 01/16/2020 Discussed OTC antihistamine for allergic sx prn.   Anticoagulation Medication Therapy --  --    Anxiety disorder 01/16/2020 Discussed anxiety and depression, pt declines SSRI, buspar, hydroxyzine. She states Xanax is only thing effective in past. discussed risks of benzo use including death, respiratory depression. Discussed in detail with pt that we will not rx controlled medication for anxiety. She was given handout for local psychiatrist to call and schedule apt. to er if si/hi.    Aortic stenosis --  --    Arthritis 1/16/2020 Keep follow up with pain mgmt. Will refer to PT for stretches/strengthening. Discussed we will not rx pain meds from our office.   Asthma --  --    Back Pain  --  --    Bladder Disorder --  --    Bowel Disease --  --    Breast lump --  --    CAD (coronary artery disease) 09/16/2020 --    Cancer --  --    CHF (congestive heart failure) 01/16/2020 Will request note from cardiology, new referral placed today. requesting previous pcp records as well as cardiology records   Chronic Obstructive Pulmonary Disease --  No current sx. Encouraged smoking cessation   Chronic pain 01/16/2020 Keep follow up with pain mgmt. We will request their records.    COPD (chronic obstructive pulmonary  disease) 01/16/2020 Encouraged smoking cessation. requesting previous records.   Depression --  Discussed med options. Pt declined. Given handout to call psych.   Diabetes --  --    Diabetes mellitus, type 2 01/16/2020 requesting recent labs. Pt has not been taking Januvia due to side effects. Will hold off starting new medication until we see recent a1c.   GERD (gastroesophageal reflux disease) --  Referred back to gi for updated EGD and colonoscopy.   Heart Disease --  --    Heart murmur 01/16/2020 --    Hepatic steatosis 09/16/2020 --    Hiatal hernia 09/16/2020 --    History of Hodgkin's lymphoma 01/16/2020 Requesting previous record, will refer back to oncology since pt has not had recent follow up.    Hyperlipemia --  --    Kidney Disease --  --    Kidney stones --  --    Knee pain 01/16/2020 Discussed knee pain, will refer to home health for PT. No meds since pt est with pain mgmt.   Limb Pain --  --    Limb Swelling --  --    Lupus (systemic lupus erythematosus) --  --    Mitral valve prolapse --  --    Neck pain --  --    Neurologic disorder --  --    Nicotine dependence 01/16/2020 encouraged smoking cessation, pt declined.    Night sweats --  --    Nipple discharge --  --    Peripheral vascular disease 01/16/2020 Discussed PVD noted on patient legs. Cont compression/compression socks. Will refer home health to help with wound care of 2 small ulcers on back of L calf. Pt will rtc if sx worsen, fever, odor, exudate.    Presence of intrathecal pump 01/16/2020 Pt has pain pump    Pulmonary embolus --  --    Renal calculus or stone --  --    Renal stone 09/16/2020 --    Sinus trouble --  Use otc antihistamine.   Skin breakdown 01/16/2020 Keep lesions clean and dry. We will send home health to do wound care on posterior calf lesions   Ulcer --  --          Past Surgical History  Procedure Name Date Notes   Back --  --    Back surgery --  --    Bladder Repair --  Early 2000s   Cholecystectomy 2001 --   "  Colonoscopy --  --    Direct revascularization heart muscle with catheter stent, prosthesis, or vein graft 2019 --    Gallbladder --  --    heart surgery --  --    Hemorrhoidectomy --  --    Hernia --  --    Hysterectomy --  --    Hysterectomy-Abdominal 1996 --    Lymph Node Excision --  --    Pain Pump 2013, 2019 --    Partial Hysterectomy --  --    Port Placement --  --    Spinal Fusion 1998 --    Tonsillectomy --  --    Tumor removal --  between vaginal and rectal wall bladder-2000         Medication List  Name Date Started Instructions   Admelog SoloStar U-100 Insulin 100 unit/mL subcutaneous insulin pen  inject by subcutaneous route per prescriber's instructions. Insulin dosing requires individualization.   Blood Glucose Test miscellaneous strip 06/03/2020 use as directed to check blood sugar TID   furosemide 40 mg oral tablet 10/08/2020 take 1 tablet (40 mg) by oral route once daily for 90 days   gabapentin 300 mg oral capsule  take 1 capsule (300 mg) by oral route 3 times per day   gentamicin 0.1 % topical ointment  apply a small amount to the affected area by topical route 2 times per day   lancets miscellaneous misc 06/03/2020 use as directed   Pain Pump-Dilaudid  --    prednisone 20 mg oral tablet  take 1 tablet (20 mg) by oral route once daily for 5 days   Tresiba FlexTouch U-100 100 unit/mL (3 mL) subcutaneous insulin pen  inject by subcutaneous route as per insulin protocol   triamcinolone acetonide 0.1 % topical ointment 01/06/2021 apply a thin layer to the affected area(s) by topical route 3 times per day   Zyvox 600 mg oral tablet  take 1 tablet (600 mg) by oral route every 12 hours for 7 days         Allergy List  Allergen Name Date Reaction Notes   amoxicillin --  SOA --    aspirin --  tinnitus --    CEPHALOSPORINS --  lip swelling --    clarithromycin --  unknown --    Compazine --  unknown --    Egg --  --  --    erythromycin --  --  --    IVP Dye --  \"bad reaction\" --    NSAIDS --  unknown --  "   PENICILLINS --  unknown --    Phenergan --  unknown --    rifampin --  hallucinations --    SULFA (SULFONAMIDES) --  unknown --        Allergies Reconciled  Family Medical History  Disease Name Relative/Age Notes   Heart Disease Daughter/  Father/  Mother/  Sister/   Mother; Father; Sister; Daughter   Cancer, Unspecified Father/  Mother/  Sister/   Mother; Father; Sister   Diabetes, unspecified type Daughter/  Father/  Mother/  Sister/   Mother; Father; Sister; Daughter  Mother   Spine Problems Mother/   --    Heart Attack (MI) Mother/   --    Renal Calculus Mother/   Mother   Family history of breast cancer Mother/   Mother   Renal Cancer Mother/   Mother  Mother/70s   -Father's Family History Unknown Father/   Father   Bladder calculus Grandmother (maternal)/  Mother/   Mother; Grandmother (maternal)   Blood Diseases Father/   Father   Osteoporosis Mother/  Sister/   Mother; Sister   Family history of Arthritis Brother/  Daughter/  Mother/   Mother; Brother; Daughter         Reproductive History  Menstrual   Menopause Status: Postmenopausal HRT?: No   Pregnancy Summary   Total Pregnancies: 0 Full Term: 0 Premature: 0   Ab Induced: 0 Ab Spontaneous: 0 Ectopics: 0   Multiples: 0 Livin         Social History  Finding Status Start/Stop Quantity Notes   Alcohol Use Never --/-- --  does not drink   Caffeine Current - status unknown --/-- --  drinks coffee  drinks regularly; 3-4 times per day   Claustophobic Unknown --/-- --  yes   Disabled --  --/-- --  --     --  --/-- --  two- one    lives with children --  --/-- --  --    Recreational Drug Use Never --/-- --  no   Second hand smoke exposure Current some day --/-- --  yes   Tobacco Current every day --/-- 1.5 ppd smoked 31 or more years  started smoking at age 89; smoked 2 cigarette(s) per day  started smoking at age 1; smoked 2 cigarette(s) per day         Review of Systems  · Constitutional  o Denies  o : fatigue, fever, weight gain,  "weight loss, chills  · Eyes  o Denies  o : changes in vision  · HENT  o Denies  o : ear pain, sore throat  · Cardiovascular  o Admits  o : chest Pain  o Denies  o : palpitations, edema (swelling)  · Respiratory  o Admits  o : frequent cough  o Denies  o : shortness of breath  · Gastrointestinal  o Denies  o : nausea, vomiting, changes in bowel habits  · Genitourinary  o Denies  o : dysuria, urinary frequency, urinary urgency, polyuria  · Integument  o Admits  o : rash, new skin lesions  · Neurologic  o Denies  o : headache, tingling or numbness, dizziness  · Musculoskeletal  o Denies  o : joint pain, myalgias  · Endocrine  o Denies  o : polydipsia, polyphagia  · Psychiatric  o Admits  o : anxiety  o Denies  o : memory changes, SI/HI  · Heme-Lymph  o Denies  o : easy bruising, easy bleeding, lymph node enlargement or tenderness  · Allergic-Immunologic  o Denies  o : eczema, seasonal allergies, urticaria      Vitals  Date Time BP Position Site L\R Cuff Size HR RR TEMP (F) WT  HT  BMI kg/m2 BSA m2 O2 Sat FR L/min FiO2 HC       05/28/2020 01:10 /62 Sitting    91 - R 16  174lbs 6oz 5'  4\" 29.93 1.89 93 %  21%    06/08/2020 01:04 PM         174lbs 0oz 5'  4\" 29.87 1.89       01/21/2021 08:13 /80 Sitting    96 - R 16 98.2 168lbs 0oz 5'  4\" 28.84 1.85 95 %            Physical Examination  · Constitutional  o Appearance  o : no acute distress, well-nourished  · Head and Face  o Head  o :   § Inspection  § : atraumatic, normocephalic  · Eyes  o Eyes  o : extraocular movements intact, no scleral icterus, no conjunctival injection  · Ears, Nose, Mouth and Throat  o Ears  o :   § External Ears  § : normal  o Nose  o :   § Intranasal Exam  § : nares patent  o Oral Cavity  o :   § Oral Mucosa  § : moist mucous membranes  · Neck  o Thyroid  o : gland size normal, nontender, no nodules or masses present on palpation, symmetric  · Respiratory  o Respiratory Effort  o : breathing comfortably, symmetric chest " rise  o Auscultation of Lungs  o : clear to asculatation bilaterally, no wheezes, rales, or rhonchii  · Cardiovascular  o Heart  o :   § Auscultation of Heart  § : regular rate and rhythm, murmur auscultated, no rubs or gallops  o Peripheral Vascular System  o :   § Extremities  § : 2+ bilateral edema, stable to pt normal  · Gastrointestinal  o Abdominal Examination  o :   § Abdomen  § : bowel sounds present, non-distended, non-tender  · Lymphatic  o Neck  o : no lymphadenopathy present  o Supraclavicular Nodes  o : no supraclavicular nodes  · Skin and Subcutaneous Tissue  o General Inspection  o : chronic lesions noted on bilateral LE. No underlying erythema or exudate concerning for infection noted at this time.   · Neurologic  o Mental Status Examination  o :   § Orientation  § : grossly oriented to person, place and time  o Gait and Station  o :   § Gait Screening  § : in wheelchair  · Psychiatric  o General  o : normal mood and affect          Assessment  · Anxiety disorder     300.00/F41.9  Discussed anxiety and depression, pt declines SSRI, buspar, hydroxyzine. She states Xanax is only thing effective in past. discussed risks of benzo use including death, respiratory depression. She is going to follow-up with community care to discuss this.  · CAD (coronary artery disease)     414.00/I25.10  Reviewed hospital note. Discussed worsening echo. Discussed importance of follow-up with cardiology and to emergency room if symptoms worsen.  · Diabetes mellitus, type 2     250.00/E11.9  Labs today.  · History of Hodgkin's lymphoma     V10.72/Z85.71  Continue follow-up with oncology.  · Pneumonia     486/J18.9  Lungs sound good on exam today. Vitals stable.  · Sepsis       Sepsis, unspecified organism     038.9/A41.9  Reviewed hospitalization. Reviewed culture results showing yeast in stool and sputum. Will send an antifungal medication at this time. No need for further antibiotics since other cultures came back  negative. Discussed importance of follow-up with specialist including wound care to evaluate lower leg wounds, cardiology to evaluate worsening/critical aortic stenosis.  · Yeast infection     112.9/B37.9  · Port-A-Cath in place     V45.89/Z95.828  · Aortic stenosis     424.1/I35.0      Plan  · Orders  o Discharge medications reconciled with the current medication list (1111F) - - 01/21/2021  o CBC with Manual Diff if indicated [facility] (34729, 56245) - 486/J18.9, 038.9/A41.9, 112.9/B37.9, 414.00/I25.10 - 01/21/2021  o Diabetes 1 Panel (CMP, Lipid, A1c) [facility] (10911, 66724, 59472) - 486/J18.9, 038.9/A41.9, 250.00/E11.9, 414.00/I25.10 - 01/21/2021  o Irrigation of implanted venous access device for drug delivery systems (03476) - V45.89/Z95.828 - 01/21/2021   Port accessed via sterile technique per protocol. Positive blood return was achieved. 9.5 of waste was removed followed byt 20ml of blood for labs. Line flushed with NS and Heparin locked per protocol. De accessed patient, tolerated well left office in stable condition. [facility] RN   · Medications  o fluconazole 200 mg oral tablet   SIG: take 2 tablets (400 mg) by oral route once daily for 14 days   DISP: (28) Tablet with 0 refills  Prescribed on 01/21/2021     o Medications have been Reconciled  o Transition of Care or Provider Policy  · Instructions  o Patient discharge summary has been reviewed and placed in patient's electronic medical record.  o Patient received a phone call from my office within 2 business days of discharge from inpatient status, and documented within the patient's chart.  o Also patient was seen (face to face) for follow up evaluation within 7 calendar days of discharge from inpatient status for a high complexity issue.  o Patient was educated on their diagnosis, treatment and any medication changes while being evaluated today.  o Electronically Identified Patient Education Materials Provided Electronically  · Disposition  o Call or Return if symptoms  worsen or persist.  o Follow up in 1 week  o Labs drawn in office today            Electronically Signed by: Damari Cornejo PA-C -Author on January 21, 2021 10:50:40 AM  Electronically Co-signed by: Nicolette Pena MD -Reviewer on January 25, 2021 08:14:31 AM

## 2021-05-15 VITALS
OXYGEN SATURATION: 95 % | TEMPERATURE: 98.3 F | WEIGHT: 167.25 LBS | HEIGHT: 64 IN | DIASTOLIC BLOOD PRESSURE: 60 MMHG | BODY MASS INDEX: 28.55 KG/M2 | RESPIRATION RATE: 16 BRPM | HEART RATE: 80 BPM | SYSTOLIC BLOOD PRESSURE: 112 MMHG

## 2021-05-15 VITALS
DIASTOLIC BLOOD PRESSURE: 78 MMHG | OXYGEN SATURATION: 93 % | RESPIRATION RATE: 16 BRPM | HEIGHT: 64 IN | BODY MASS INDEX: 28.51 KG/M2 | HEART RATE: 90 BPM | SYSTOLIC BLOOD PRESSURE: 104 MMHG | WEIGHT: 167 LBS | TEMPERATURE: 97.7 F

## 2021-05-15 VITALS
BODY MASS INDEX: 29.77 KG/M2 | OXYGEN SATURATION: 93 % | SYSTOLIC BLOOD PRESSURE: 106 MMHG | WEIGHT: 174.37 LBS | HEART RATE: 91 BPM | RESPIRATION RATE: 16 BRPM | HEIGHT: 64 IN | DIASTOLIC BLOOD PRESSURE: 62 MMHG

## 2021-05-15 VITALS — WEIGHT: 174 LBS | HEIGHT: 64 IN | BODY MASS INDEX: 29.71 KG/M2

## 2021-05-15 VITALS
WEIGHT: 163 LBS | OXYGEN SATURATION: 92 % | BODY MASS INDEX: 25.53 KG/M2 | DIASTOLIC BLOOD PRESSURE: 64 MMHG | HEART RATE: 93 BPM | SYSTOLIC BLOOD PRESSURE: 96 MMHG | TEMPERATURE: 97.8 F

## 2021-05-15 VITALS
BODY MASS INDEX: 27.83 KG/M2 | TEMPERATURE: 98.2 F | HEIGHT: 64 IN | OXYGEN SATURATION: 96 % | SYSTOLIC BLOOD PRESSURE: 100 MMHG | DIASTOLIC BLOOD PRESSURE: 60 MMHG | RESPIRATION RATE: 14 BRPM | HEART RATE: 90 BPM | WEIGHT: 163 LBS

## 2021-05-16 VITALS — RESPIRATION RATE: 14 BRPM | BODY MASS INDEX: 30.73 KG/M2 | WEIGHT: 180 LBS | HEIGHT: 64 IN

## 2021-05-21 ENCOUNTER — OFFICE VISIT CONVERTED (OUTPATIENT)
Dept: INTERNAL MEDICINE | Facility: CLINIC | Age: 56
End: 2021-05-21
Attending: PHYSICIAN ASSISTANT

## 2021-05-21 ENCOUNTER — HOSPITAL ENCOUNTER (OUTPATIENT)
Dept: OTHER | Facility: HOSPITAL | Age: 56
Discharge: HOME | End: 2021-05-21
Attending: PHYSICIAN ASSISTANT

## 2021-05-21 ENCOUNTER — CONVERSION ENCOUNTER (OUTPATIENT)
Dept: INTERNAL MEDICINE | Facility: CLINIC | Age: 56
End: 2021-05-21

## 2021-05-21 LAB
BILIRUB UR QL STRIP: NEGATIVE
COLOR UR: YELLOW
CONV CLARITY OF URINE: NORMAL
CONV PROTEIN IN URINE BY AUTOMATED TEST STRIP: NORMAL
CONV UROBILINOGEN IN URINE BY AUTOMATED TEST STRIP: NORMAL
GLUCOSE UR QL: NEGATIVE
HGB UR QL STRIP: NORMAL
KETONES UR QL STRIP: NEGATIVE
LEUKOCYTE ESTERASE UR QL STRIP: NORMAL
NITRITE UR QL STRIP: NEGATIVE
PH UR STRIP.AUTO: 5.5 [PH]
SP GR UR: 1.02

## 2021-05-23 LAB
AMPICILLIN SUSC ISLT: <=0.25
BACTERIA UR CULT: ABNORMAL
CEFOTAXIME SUSC ISLT: <=0.12
CEFTRIAXONE SUSC ISLT: <=0.12
LEVOFLOXACIN SUSC ISLT: 0.5
PENICILLIN G SUSC ISLT: <=0.06
TETRACYCLINE SUSC ISLT: >=16
TIGECYCLINE SUSC ISLT: <=0.06
VANCOMYCIN SUSC ISLT: 0.5

## 2021-05-28 VITALS
OXYGEN SATURATION: 93 % | OXYGEN SATURATION: 100 % | HEIGHT: 70 IN | BODY MASS INDEX: 24.49 KG/M2 | WEIGHT: 171.08 LBS | SYSTOLIC BLOOD PRESSURE: 133 MMHG | HEART RATE: 81 BPM | SYSTOLIC BLOOD PRESSURE: 117 MMHG | BODY MASS INDEX: 26.45 KG/M2 | WEIGHT: 170.42 LBS | TEMPERATURE: 97.8 F | DIASTOLIC BLOOD PRESSURE: 62 MMHG | OXYGEN SATURATION: 97 % | TEMPERATURE: 98.2 F | HEART RATE: 75 BPM | BODY MASS INDEX: 26.42 KG/M2 | DIASTOLIC BLOOD PRESSURE: 69 MMHG | SYSTOLIC BLOOD PRESSURE: 102 MMHG | OXYGEN SATURATION: 97 % | TEMPERATURE: 97.6 F | DIASTOLIC BLOOD PRESSURE: 60 MMHG | HEIGHT: 70 IN | WEIGHT: 169.31 LBS | OXYGEN SATURATION: 98 % | DIASTOLIC BLOOD PRESSURE: 71 MMHG | HEIGHT: 70 IN | SYSTOLIC BLOOD PRESSURE: 112 MMHG | DIASTOLIC BLOOD PRESSURE: 71 MMHG | HEART RATE: 80 BPM | TEMPERATURE: 98.1 F | HEART RATE: 100 BPM | HEIGHT: 70 IN | SYSTOLIC BLOOD PRESSURE: 117 MMHG | WEIGHT: 184.75 LBS | TEMPERATURE: 97.5 F | WEIGHT: 184.53 LBS | HEART RATE: 87 BPM | HEIGHT: 70 IN | BODY MASS INDEX: 24.24 KG/M2 | BODY MASS INDEX: 24.4 KG/M2

## 2021-05-28 VITALS
TEMPERATURE: 97.7 F | SYSTOLIC BLOOD PRESSURE: 124 MMHG | OXYGEN SATURATION: 98 % | WEIGHT: 175.27 LBS | BODY MASS INDEX: 25.09 KG/M2 | DIASTOLIC BLOOD PRESSURE: 83 MMHG | RESPIRATION RATE: 20 BRPM | HEART RATE: 84 BPM | HEIGHT: 70 IN

## 2021-05-28 VITALS
RESPIRATION RATE: 18 BRPM | BODY MASS INDEX: 24.34 KG/M2 | HEIGHT: 70 IN | HEART RATE: 89 BPM | WEIGHT: 170 LBS | DIASTOLIC BLOOD PRESSURE: 63 MMHG | OXYGEN SATURATION: 97 % | DIASTOLIC BLOOD PRESSURE: 75 MMHG | HEART RATE: 93 BPM | SYSTOLIC BLOOD PRESSURE: 119 MMHG | BODY MASS INDEX: 25.75 KG/M2 | WEIGHT: 179.9 LBS | TEMPERATURE: 97.5 F | TEMPERATURE: 97.6 F | OXYGEN SATURATION: 99 % | HEIGHT: 70 IN | SYSTOLIC BLOOD PRESSURE: 122 MMHG

## 2021-05-28 VITALS
OXYGEN SATURATION: 94 % | DIASTOLIC BLOOD PRESSURE: 75 MMHG | HEIGHT: 70 IN | BODY MASS INDEX: 25.03 KG/M2 | SYSTOLIC BLOOD PRESSURE: 137 MMHG | RESPIRATION RATE: 18 BRPM | TEMPERATURE: 98 F | HEART RATE: 88 BPM | WEIGHT: 174.82 LBS

## 2021-05-28 NOTE — PROGRESS NOTES
Patient: RENÉE ABARCA     Acct: [id number]     Report: #[id number]  UNIT #: [id number]     : 1965    Encounter Date:05/10/2019  PRIMARY CARE: Lety Schulz  ***Signed***  --------------------------------------------------------------------------------------------------------------------  NURSE INTAKE      Visit Type      Established Patient Visit            Chief Complaint      HODGKINS LYMPHOMA            Referring Provider/Copies To      Primary Care Provider:  Lety Schulz            History and Present Illness      Past Oncology Illness History      Patient initially seen by Dr. Olsen            This is a very pleasant 52-year-old female who presents to oncology clinic for     new symptoms of drenching night sweats, fever, and 15 pound weight loss in the     last 6 months.            -.  Patient was diagnosed with nodular sclerosing Hodgkin's l    ymphoma.  This was from a suspicious right groin mass which was resected by Dr. Peña.  She had clinical stage III involvement of the mediastinal, right groin,     left axilla, and mild splenomegaly.  Patient received 3 cycles of ABVD.  She was    recommended 6 cycles but could not tolerate treatment.  On follow-up study she     did have a complete response.      -2011 to .  Patient presented with right groin     lymphadenopathy with B symptoms of drenching night sweats, fever, and 10 pound     weight loss.  Biopsy yielded a diagnosis of recurrent Hodgkin's disease.      Patient was seen at the [facility] bone marrow transplant unit.      Patient then underwent 4 additional cycles of ABVD.  An ABVD without bleomycin     for 2 cycles.  Last chemotherapy was on .  Patient had residual     solitary right inguinal lymph node which was PET avid with an SUV of 5.6.      Patient was scheduled to receive consolidative radiation therapy to the right     iliac chain of lymph nodes.  30.6  denisse to the entire area.      -2011 through 2012..  Per patient was diagnosed with Hodgkin's lymphoma.      Patient was treated with      -February  through March .  Patient initially had a right inguinal    extra iliac and internal iliac lymph nodes treated with 30.6 denisse in 17     fractions.      -January .  Patient was seen as an urgent visit for back pain.  No     neurologic deficits.  Patient has normal bowel function and no urinary     incontinence concerning for cauda equina.        -February 3, 2017- has increased itching all over, bilateral leg edema/itching     she has had x 9 months. needs refill for anxiety med until she is able to get     into pain management for pain pump mgmt and pain management. PET ordered but no     date yet.       -February .  PET/CT showed persistent pulmonary nodule in the right lower    lobe.  Follow-up CT of the chest in 6 months recommended.  No lesions concerning    for cancer recurrence.      -June .  CT neck showed several small cervical lymph nodes.  Stable in     size.      -September 1-2017.  CT neck, chest, abdomen, and pelvis showed several non-    pathologically enlarged lymph nodes seen within the chest, abdomen, and pelvis.     The lymph nodes in the abdomen and pelvis appear stable. Hepatosplenomegaly.      -October .  Admitted for right lower extremity cellulitis.      -December 12, 2017. PET CT Skull to Base of mid thigh -  Deauville category 1.     No evidence of adenopathy hypermetabolic activity. Coronary artery and aortic     valvular calcification.       -January .  CT chest showed no evidence of acute intrathoracic     abnormality.  Stable granulomatous inflammatory disease.  CT abdomen and pelvis     showed hepatic steatosis.      -May 2, 2018.  Requested follow up for skin issues that appear to be boils not     cancer related.  Discussed future follow up with Survivorship clinic , cancer f    ree for 5  years.  Focus on survivorship and not cancer.        -August 8, 2018. Presents in follow up on her Stage III Hodgkins Lymphoma.  She     is > than 5 years out from treatment.  Has several comorbidities including     Aortic Stenosis, COPD, CVI, Anxiety and Depression, chronic back pain and     immobility.  Is undergoing cardiac cath 8/13/18. Heavy smoker.  Denies fever,     chills, night sweats and lymphadenopathy.        -December 14,2018. WBC 5.44, Hemoglobin 13.20, Platelet count 153,000.            HPI - Oncology Interim      Hyun is here for follow-up.  She complains of right groin pain for the past 1-    1/2 months associated with night sweats for the past 4 to 5 months..  She denies    loss of appetite, loss of weight.  She currently remains an active smoker.      She has recently been switched to gabapentin from Xanax and clearly is not happy    about it as she continues to remain with insomnia for the same.      She denies any change of GI or  habits.            Her last mammogram was 1/2019-benign      Last laboratory investigations: 3/15/2019:      WBC 6.19 hemoglobin 13.6 hematocrit 43.6 platelet count 162,000 normal differen    tial; CMP grossly within normal limits except for elevated blood glucose.            Clinical Staging      Stage III Hodgkins            Clinical Trial Participant      No            ECOG Performance Status      3            PAST, FAMILY   Past Medical History      Past Medical History:  Depression      Hematology/Oncology (F):  Anemia, Lymphoma (HODGKIKS)            Past Surgical History      Biopsy, VAD Placement            Family History      Family History:  Lymphoma            Social History      Lives independently:  Yes      Occupation:  DISABLED            Tobacco Use      Tobacco status:  Current every day smoker      Smoking packs/day:  1      Smoking history:  25-50 pack years            Alcohol Use      Alcohol intake:  None            Substance Use      Substance  use:  Denies use            REVIEW OF SYSTEMS      General:  Admits: Fatigue;          Denies: Appetite Change, Fever, Night Sweats, Weight Gain, Weight Loss      Eye:  Denies: Blurred Vision, Corrective Lenses, Diplopia, Eye Irritation, Eye     Pain, Eye Redness, Spots in Vision, Vision Loss      ENT:  Denies: Headache, Hearing Loss, Hoarseness, Seizures, Sinus Congestion,     Sore Throat      Cardiovascular:  Denies: Chest Pain, Edema Ankles, Edema Legs, Irregular     Heartbeat, Palpitations      Respiratory:  Denies: Coughing Blood, Productive Cough, Shortness of Air,     Wheezing      Gastrointestinal:  Denies: Bloody Stools, Constipation, Diarrhea, Frequent     Heartburn, Nausea, Problem Swallowing, Tarry Stools, Unable to Control Bowels,     Vomiting      Genitourinary (female):  Denies: Blood in Urine, Decrease Urine Stream, Frequent    Urination, Incontinence, Painful Urination      Musculoskeletal:  Admits: Back Pain;          Denies: Leg Cramps, Muscle Pain, Muscle Weakness, Painful Joints, Swollen     Joints      Integumentary:  Denies: Bleeds Easily, Bruises Easily, Hair Changes, Jaundice,     Lesions, Mole Changes, Nail Changes, Pigment Changes, Rash, Skin Discoloration      Neurologic:  Denies: Dizziness, Fainting, Numbness\Tingling, Paralysis, Seizures      Psychiatric:  Denies: Anxiety, Confused, Depression, Disoriented, Memory Loss      Endocrine:  Denies: Cold Intolerance, Diabetes, Excessive Sweating, Excessive     Thirst, Excessive Urination, Heat Intolerance, Flushing, Hyperthyroidism,     Hypothyroidism      Hematologic/Lymphatic:  Denies: Bruising, Bleeding, Enlarged Lymph Nodes,     Recurrent Infections, Transfusions      Reproductive:  Denies: Menopause, Heavy Periods, Pregnant, Still Menstruating            VITAL SIGNS AND SCORES      Vitals      Height 5 ft 10 in / 177.8 cm      Weight 175 lbs 4.251 oz / 79.5 kg      BSA 1.97 m2      BMI 25.1 kg/m2      Temperature 97.7 F / 36.5 C -  Temporal      Pulse 84      Respirations 20      Blood Pressure 124/83 Sitting, Right Arm      Pulse Oximetry 98%, room air            Pain Score      Experiencing any pain?:  Yes      Pain Scale Used:  Numerical      Pain Intensity:  10            Fatigue Score      Experiencing any fatigue?:  Yes      Fatigue (0-10 scale):  9            EXAM      Other      General appearance: , in no apparent distress, cooperative, appears older than     stated age.      HEENT: No pallor, no icterus, oral mucosa moist      Neck: Supple, trachea central-not deviated      Chest wall: Chemo-Port in place and site clean      Lymph nodes: none palpable peripherally      Cardiovascular: S1-S2 heard, no murmurs, no rubs, no gallops.      Respiratory: Clear to auscultation bilaterally, no adventitious sounds      Abdomen/gastro: Soft, tender in the right groin to the right lower quadrant     region-no rebound or guarding, no palpable hepatosplenomegaly, bowel sounds     heard      Skin: No lesions, no rashes, no petechiae.      Extremities: Mild pedal edema, changes of venous stasis noted.            PREVENTION      Influenza Vaccine Declined:  Yes      2 or More Falls Past Year?:  No      Fall Past Year with Injury?:  No      Encouraged to follow-up with:  PCP regarding preventative exams.      Chart initiated by      LENIN CARR            ALLERGY/MEDS      Allergies      Coded Allergies:             AMOXICILLIN (Verified  Allergy, Severe, ANAPHALAX, 1/14/19)           PENICILLINS (Verified  Allergy, Severe, TROUBLE BREATHING, 1/14/19)           SULFA (SULFONAMIDE ANTIBIOTICS) (Verified  Allergy, Severe, TIGHTNESS IN     THROAT, TROUBLE BREATHING,        CEFAZOLIN (Verified  Allergy, Unknown, SWELLING IN LIPS, 1/14/19)           CEPHALEXIN (Verified  Allergy, Unknown, SWELLING IN LIPS, 1/14/19)           CEPHALOSPORINS (Verified  Allergy, Unknown, SWELLING IN LIPS, 1/14/19)           CLARITHROMYCIN (Verified  Allergy, Unknown,  "UNKNOWN, 1/14/19)           DIPHENHYDRAMINE HCL (Verified  Allergy, Unknown, UNK, 1/14/19)           DOXYCYCLINE (Verified  Allergy, Unknown, \"pt reports red bumps on my eyes     and makes them swell\", 1/14/19)           IODINE AND IODIDE CONTAINING PRODUC (Verified  Allergy, Unknown, HIVES,     ITCHING, 1/14/19)           PROCHLORPERAZINE (Verified  Allergy, Unknown, HIVES, ITCHING, 1/14/19)           PROMETHAZINE (Verified  Allergy, Unknown, HIVES, ITCHING, 1/14/19)           ASPIRIN (Verified  Adverse Reaction, Unknown, \"INTERACTS WITH COUMADIN\",     1/14/19)           EGG (Verified  Adverse Reaction, Unknown, VOMITING, 1/14/19)           IODINATED CONTRAST- ORAL AND IV DYE (Verified  Adverse Reaction, Unknown,     SEVERE PAIN IN EXTREMITY IN WHICH MEDICATION WAS GIVEN , 1/14/19)           LORAZEPAM (Verified  Adverse Reaction, Unknown, SEVERE DREAMS, 1/14/19)           NSAIDS (NON-STEROIDAL ANTI-INFLAMMA (Verified  Adverse Reaction, Unknown,     LEG CRAMPS, 1/14/19)           ZOLPIDEM TARTRATE (Verified  Adverse Reaction, Unknown, MAKES VERY     EMOTIONAL, 1/14/19)            Medications      Last Reconciled on 1/14/19 13:38 by GINNY JOHN      Moxifloxacin Hcl (Moxeza) 3 Ml Drops.visc      1 DROPS, BOTTLE         Reported         1/14/19       Gabapentin (Gabapentin) 250 Mg/5 Ml Solution      600 MG PO TID, #1080 ML         Reported         12/14/18       Acetaminophen (Tylenol) 325 Mg Tablet      650 MG PO Q4H PRN for PAIN OR FEVER, #100 TAB 0 Refills         Reported         12/28/17       Hydromorphone HCl (Dilaudid) Unknown Strength Ampul      CONTINUOUS, AMP         Reported         10/12/17      Medications Reviewed:  No Changes made to meds            IMPRESSION/PLAN      Diagnosis      Hodgkin lymphoma, lymphocyte-rich - C81.40            Abdominal pain - R10.9            Insomnia - G47.00            Notes      1.History of Hodgkin's lymphoma-2 episodes of the same.  First diagnosed in 2008  "   and was treated with ABVD for 3 cycles with complete response as it was a     clinical stage III (she could not tolerate chemotherapy and hence did not     complete 6 cycles at the time).  Patient relapsed in 2011 and was treated with 4    cycles of ABVD and 2 cycles with bleomycin dropped and adjuvant radiation     therapy of the residual lesion.  Patient has been in complete remission since     March 2013.            Currently with B symptoms and similar right groin/inguinal pain with no palpable    masses or adenopathy.            Obtain PET/CT to reassess as well as laboratory investigations-CBC with     differential, CMP, LDH            Patient is to review with us once the laboratory and PET/CT results are     available.            2.  Insomnia, anxiety, depression-patient is requesting for Xanax as she is     unable to sleep and gabapentin does not seem to be helping her in any other     manner except for the neuropathy.  I have advised her to go back to her primary     care physician and potentially obtain a referral for psychiatry to help manage     with the symptoms.            The total time of the visit was 30 minutes.  Over 50% of that time was spent in     face-to-face counseling reviewing the work-up, discussing different treatment     options, risk benefits associated with prophylactic treatment coordination of     care.      New Diagnostics      * PET SKULLBASE MID THIGH SUBSQ, Routine         Dx: Hodgkin lymphoma, lymphocyte-rich - C81.40      * CBC With Auto Diff, Routine         Dx: Hodgkin lymphoma, lymphocyte-rich - C81.40      * CMP Comp Metabolic Panel, Routine         Dx: Hodgkin lymphoma, lymphocyte-rich - C81.40      * LDH, Routine         Dx: Hodgkin lymphoma, lymphocyte-rich - C81.40            Patient Education      Patient Education Provided:  Yes                 Disclaimer: Converted document may not contain table formatting or lab diagrams. Please see [facility] for the authenticated document.

## 2021-05-28 NOTE — PROGRESS NOTES
Patient: RENÉE ABARCA     Acct: [id number]     Report: #[id number]  UNIT #: [id number]     : 1965    Encounter Date:2018  PRIMARY CARE: Lety Schulz  ***Signed***  --------------------------------------------------------------------------------------------------------------------  Current Plan      -Hodgkin's lymphoma      -Patient has had 2 separate episodes of Hodgkin's lymphoma.  She was originally     diagnosed in .  Was treated with ABVD for 3 cycles.  Achieved complete     response.      -Patient relapsed in .  Patient was treated with 6 cycles of ABVD with     radiation treatment of the residual lesion.      -Patient has palpable lymphadenopathy in the left supraclavicular lymph node     and left inguinal lymphadenopathy.      -Patient was seen for urgent care visit for worsening back pain with B     symptoms.  We discussed the need for PET CT since there is a strong concern for     Hodgkin's lymphoma recurrence..      -PET/CT in 2017 did not show evidence of cancer recurrence.      -Currently in remission.  Still has persistent inguinal lymphadenopathy and     neck lymphadenopathy            -Right lower extremity cellulitis      -Patient was seen clinic.  Was noted to have erythematous and warm to touch     right thigh with an open pustule.      -Counseled patient to go to an emergency room for consideration of inpatient     antibiotics.      -Improved with antibiotics.  Continue antibiotics prescribed by primary care     physician            Interval Clinic Visit      -Refill on Xanax.       -RTC in one month      -Continue observation.            Medical Evaluation of Acute and Chronic Medical Conditions today      -Pulmonary nodule      -Right lower lobe pulmonary nodule is noted.        -Orders placed for follow-up CT in 6 months.      -F/U CT Chest is ordered.       -Heart Murmur      -Patient will be followed by cardiology.      -No chest pain or shortness of  breath today.      -Back pain      -Acute visit for worsening back pain.      -Patient has no incontinence or bowel problems concerning for Cauda Equina.        -Patient is on a Dilaudid pain pump managed by [facility]     group.       -Patient was given a short acting hydrocodone acetaminophen for breakthrough     pain.      -Continue follow-up with pain medicine clinic.        -Anxiety      -Well controlled currently.      -Continue current medications-      -Refill Xanax, 0.5 mg BID, # 60 tabs, no refills.            Medical Evaluation of Cancer Associated Symptoms today      -Cancer associated pain      -Patient's pain has remained stable.      -Continue current pain regimen.      -Cancer associated muscle weakness      -Patient's weakness has remained stable.      -Continue close observation.      -Cancer associated fatigue      -Patient's fatigue has remained stable.      -Recommended exercise.      -Anxiety due to cancer      -Patient's anxiety is well controlled today.       -Continue current management.       -Today's Evaluation      -Patient's imaging exams, blood tests, physicians' notes, and any new findings     since our last visit were reviewed today to reassess patient's medical     treatment plan. .       -Old medical records were reviewed and summarized in chronological order in the     HPI today to maintain an updated medical record.       -Patient's radiology imaging tests from our last visit were reviewed     independently by me by direct visualization of the images.        -Patients current lab tests and medications were carefully reviewed to evaluate     patient's current treatment plan today.       -Patient was advised to call us right away if there are any new symptoms for an     urgent visit for further evaluation. Patient voiced understanding and agreed to     do so.      Hodgkin lymphoma, lymphocyte-rich - C81.00            Notes      New Diagnostics      * CBC, As Soon  As Possible       Dx: Hodgkin lymphoma, lymphocyte-rich - C81.00      * CMP Comp Metabolic Panel, Stat       Dx: Hodgkin lymphoma, lymphocyte-rich - C81.00      * Iron Profile, Stat       Dx: Hodgkin lymphoma, lymphocyte-rich - C81.00      * Serum Ferritin Level, As Soon As Possible       Dx: Hodgkin lymphoma, lymphocyte-rich - C81.00      * B12    Dx: Hodgkin lymphoma, lymphocyte-rich - C81.00      Patient Education Provided:  Yes            ECOG      ECOG Performance Status:  3            History and Present Illness      -August .  Patient was diagnosed with nodular sclerosing Hodgkin's     lymphoma.  This was from a suspicious right groin mass which was resected by     Dr. Peña.  She had clinical stage III involvement of the mediastinal, right     groin, left axilla, and mild splenomegaly.  Patient received 3 cycles of ABVD.      She was recommended 6 cycles but could not tolerate treatment.  On follow-up     study she did have a complete response.      -February 2011 to October .  Patient presented with right groin     lymphadenopathy with B symptoms of drenching night sweats, fever, and 10 pound     weight loss.  Biopsy yielded a diagnosis of recurrent Hodgkin's disease.      Patient was seen at the [facility] bone marrow transplant unit.      Patient then underwent 4 additional cycles of ABVD.  An ABVD without bleomycin     for 2 cycles.  Last chemotherapy was on October .  Patient had residual     solitary right inguinal lymph node which was PET avid with an SUV of 5.6.      Patient was scheduled to receive consolidative radiation therapy to the right     iliac chain of lymph nodes.  30.6 denisse to the entire area.      -2011 through 2012..  Per patient was diagnosed with Hodgkin's lymphoma.      Patient was treated with      -February  through March .  Patient initially had a right     inguinal extra iliac and internal iliac lymph nodes treated with 30.6 denisse  in     17 fractions.      -January .  Patient was seen as an urgent visit for back pain.  No     neurologic deficits.  Patient has normal bowel function and no urinary     incontinence concerning for cauda equina.        -February 3, 2017- has increased itching all over, bilateral leg edema/itching     she has had x 9 months. needs refill for anxiety med until she is able to get     into pain management for pain pump mgmt and pain management. PET ordered but no     date yet.       -February .  PET/CT showed persistent pulmonary nodule in the right     lower lobe.  Follow-up CT of the chest in 6 months recommended.  No lesions     concerning for cancer recurrence.      -June .  CT neck showed several small cervical lymph nodes.  Stable in     size.      -September 1-2017.  CT neck, chest, abdomen, and pelvis showed several non-    pathologically enlarged lymph nodes seen within the chest, abdomen, and pelvis.     The lymph nodes in the abdomen and pelvis appear stable. Hepatosplenomegaly.      -October .  Admitted for right lower extremity cellulitis.      -December 12, 2017. PET CT Skull to Base of mid thigh -  Deauville category 1.     No evidence of adenopathy hypermetabolic activity. Coronary artery and aortic     valvular calcification.             This is a very pleasant 51-year-old female who presents to oncology clinic for     new symptoms of drenching night sweats, fever, and 15 pound weight loss in the     last 6 months.            Chief Complaint      Jan 9, 2018      HODGKINS LYMPHOMA            Vitals      Height 5 ft 10.00 in / 177.8 cm      Weight 184 lbs 8.400 oz / 83.7 kg      BSA 2.05 m2      BMI 26.5 kg/m2      Temperature 97.8 F / 36.56 C - Temporal      Pulse 87      Blood Pressure 117/69 Sitting, Right Arm      Pulse Oximetry 97%, rm air            General Information      Primary Provider:  WENDIE NOYOLA            Allergies      Coded Allergies:              "AMOXICILLIN (Verified  Allergy, Severe, ANAPHALAX, 1/9/18)           PENICILLINS (Verified  Allergy, Severe, TROUBLE BREATHING, 1/9/18)           SULFA (SULFONAMIDE ANTIBIOTICS) (Verified  Allergy, Severe, TIGHTNESS IN     THROAT, TROUBLE BREATHING,        CEFAZOLIN (Verified  Allergy, Unknown, SWELLING IN LIPS, 1/9/18)           CEPHALEXIN (Verified  Allergy, Unknown, SWELLING IN LIPS, 1/9/18)           CEPHALOSPORINS (Verified  Allergy, Unknown, SWELLING IN LIPS, 1/9/18)           CLARITHROMYCIN (Verified  Allergy, Unknown, UNKNOWN, 1/9/18)           DIPHENHYDRAMINE HCL (Verified  Allergy, Unknown, UNK, 1/9/18)           DOXYCYCLINE (Verified  Allergy, Unknown, \"pt reports red bumps on my eyes     and makes them swell\", 1/9/18)           IODINE AND IODIDE CONTAINING PRODUC (Verified  Allergy, Unknown, HIVES,     ITCHING, 1/9/18)           PROCHLORPERAZINE (Verified  Allergy, Unknown, HIVES, ITCHING, 1/9/18)           PROMETHAZINE (Verified  Allergy, Unknown, HIVES, ITCHING, 1/9/18)           ASPIRIN (Verified  Adverse Reaction, Unknown, \"INTERACTS WITH COUMADIN\", 1/ 9/18)           EGG (Verified  Adverse Reaction, Unknown, VOMITING, 1/9/18)           IODINATED CONTRAST- ORAL AND IV DYE (Verified  Adverse Reaction, Unknown,     SEVERE PAIN IN EXTREMITY IN WHICH MEDICATION WAS GIVEN , 1/9/18)           LORAZEPAM (Verified  Adverse Reaction, Unknown, SEVERE DREAMS, 1/9/18)           NSAIDS (NON-STEROIDAL ANTI-INFLAMMA (Verified  Adverse Reaction, Unknown,     LEG CRAMPS, 1/9/18)           ZOLPIDEM TARTRATE (Verified  Adverse Reaction, Unknown, MAKES VERY     EMOTIONAL, 1/9/18)            Medications      Last Reconciled on 1/9/18 18:34 by KORINA SHEPARD MD      Clindamycin HCl (Clindamycin*) 300 Mg Capsule      600 MG PO TID for 14 Days, #84 CAP         Prov: MORA ROSE         12/29/17       Acetaminophen* (Tylenol*) 325 Mg Tablet      650 MG PO Q4H Y for PAIN OR FEVER, #100 TAB 0 Refills         Reported       "   12/28/17       ALPRAZolam (Xanax) 0.5 Mg Tab      1 MG PO BID, TAB 0 Refills         Reported         10/12/17       Hydromorphone HCl (Dilaudid) Unknown Strength Ampul      CONTINUOUS, AMP         Reported         10/12/17      Current Medications      Current Medications Reviewed 1/9/18            Pain and Fatigue Scales      Pain Assessment:           Experiencing any pain?:  Yes         Pain Scale Used:  Numerical         Pain Intensity:  7      Fatigue:           Experiencing any fatigue?:  Yes         Fatigue (0-10 scale):  3            Chemo Status      On Oral Chemotherapy?:  No            Other      Clinical Trial Participant?:  No            Past Medical History      No Diabetes Type 1, No Diabetes Type 2, No Thyroid Disease, No COPD, No     Emphysema, No Hypertension, No Stroke, No High Cholesterol, No Heart Attack, No     Bleeding Condition, No Low or High RBC Count, No Low or High WBC Count, No Low     or High Platelet Coun, No Hepatitis, No Kidney Disease, No Depression, No     Alzheimer's Disease, No Mental Disease, No Seizures, No Arthritis, No     Osteoporosis, No Osteopenia, No Short of Air, No Sleep apnea, No Liver Disease,     No STD, No Enlarged Prostate, No Other      Hematology/oncology:  REPORTS HX OF: Lymphoma (hodgkins), DENIES HX OF:     Previous Treatment for CA, Anemia, Bladder Cancer, Blood cancer, Brain cancer,     Breast cancer, Cervical cancer, Coagulopathy, Colorectal cancer, Endocrine     cancer, Eye cancer, GI cancer,  cancer, Kidney cancer, Leukemia, Leukocytosis    , Leukopenia, Liver cancer, Lung cancer, Musculoskeletal cancer, Myeloma,     Neurologic cancer, Oral cancer, Ovarian cancer, Skin cancer, Stomach cancer,     Thrombocytopenia, Thyroid cancer, Uterine cancer, Other cancer history, Other     hematologic history      Genetic/metabolic:  DENIES HX OF: Cystic fibrosis, Down syndrome, Other genetic     history, Other metabolic history            Past Surgical History       REPORTS HX OF: Cholecystectomy, Biopsy (shoulder, lower back surgery), Other     Past Surgical Hx (tonsilectomy, tumor removed from vaginal area), DENIES HX OF:     Cataract extraction, Thyroid surgery, Lung biopsy, CABG surgery, Coronary stent    , Valve replacement, Appendectomy, Splenectomy, Bladder surgery, Nephrectomy,     Joint replacement, Frature repair, Skin cancer removal, Melanoma excision,     Spinal surgery, Breast biopsy, Lumpectomy, Mastectomy, bilateral, Mastectomy,     right, Mastectomy, left, Hysterectomy, Peg Tube Placement, VAD Placement            Family History      REPORTS HX OF: Kidney Cancer (mother), Other Cancer History (panceratic ca,     GRANDMOTHER, SISTER, AND FATHER), DENIES HX OF: Anemia, Blood disorders, Blood     Cancer, Breast cancer, Cervical cancer, Coagulopathy, Colorectal cancer,     Endocrine Cancer, Eye Cancer, GI Cancer,  Cancer, Leukemia, Leukocytosis,     Leukopenia, Liver Cancer, Lung cancer, Lymphoma, Melanoma, Musculoskeletal     Cancer, Myeloma, Neurologic Cancer, Oral Cancer, Ovarian cancer, Prostate cancer    , Skin Cancer, Stomach Cancer, Testicular Cancer, Thrombocytopenia, Thyroid     cancer, Uterine cancer, Other Hematology History            Social History      Yes      DISABLED            Tobacco Use      Tobacco status:  Current every day smoker      Smoking packs/day:  1      Smoking history:  25-50 pack years            Alcohol Use      Alcohol intake:  None            Substance Use      Substance use:  Denies use            ROS      General:  Complains of: Fatigue, Denies: Appetite change      Eyes:  Denies: Blurred vision      Ears, nose, mouth, throat:  Denies: Headache      Cardiovascular:  Denies: Chest pain      Respiratory:  Denies: Chest pain      Gastrointestinal:  Denies: Nausea      Kidney/Bladder:  Denies: Painful Urination      Musculoskeletal:  Denies: New Back pain      Skin:  DENIES: Jaundice      Neurological:  Denies: Dizziness       Psychiatric:  Complains of: AAO X 3            Vitals            Vital Signs              Date Time  Temp Pulse Resp B/P (MAP) Pulse Ox O2 Delivery O2 Flow Rate FiO2             11/28/17 11:15 98.5 84  143/78 98 ROOM AIR              General Appearance:  Alert, Oriented X3, Cooperative, No acute distress      Eyes:  Anicteric Sclerae, Moist Conjunctiva, PERRLA      HEENT:  Orophraynx clear, No Erythema, No Exudates      Neck:  Supple, Full ROM, No Masses or JVD      Respiratory:  CTAB, Diminished Breath, No Rales, No Crackels, No Stridor, No     Rhonchi      Breast\Chest:  Symmetrical, No Nipple Discharge, No Masses      Abdomen\Gastro:  Soft, No NABS, No Masses, No Hernias, No Hepatosplenomegaly      Cardio:  No RRR, No Murmur, No, Peripheral Edema, Normal PMI, Other (heart     murmur present. )      Skin:  Normal Temperature, Normal Tone, Normal Texture and Turgor, No Rash,     lesions, ulcers      Psychiatric:  Appropriate Affect, Intact Judgement, AAO x3      Neuro:  Cranial Nerve II-XII Inta, No Focal Sensory Deficit      Genitourinary:  No Keating Catheter, No Bladder Distention      Muscularskeletal:  Normal Gait and Station, Full ROM of extremeties, Full     muscle strength\tone      Extremities:  No Digital Cyanosis, No Digital Ischemia, Pedal Pulses Intact,     Pedal Pulses Symetrical, Weakness, Other (right lower extremity erythema and     pain.)      Lymphatic:  No Cervical, No Supraclavicular, No Infraclavicular, No Axillary,     No Inguinal            Lab Results      Laboratory Tests      12/28/17 06:30            Laboratory Tests            Test        12/27/17      19:00             Magnesium Level        1.87 mg/dL      (1.60-2.30)            Hx Influenza Vaccination:  No      Influenza Vaccine Declined:  Yes      2 or More Falls Past Year?:  No      Hx Pneumococcal Vaccination:  No      Encouraged to follow-up with:  PCP regarding preventative exams.      Chart initiated by      adi ingram ma                  Disclaimer: Converted document may not contain table formatting or lab diagrams. Please see [facility] for the authenticated document.

## 2021-05-28 NOTE — PROGRESS NOTES
Patient: RENÉE ABARCA     Acct: [id number]     Report: #[id number]  UNIT #: [id number]     : 1965    Encounter Date:2018  PRIMARY CARE: Lety Schulz  ***Signed***  --------------------------------------------------------------------------------------------------------------------  Chief Complaint      Mar 7, 2018      HOGKINS LYMPHOMA      Intent of Therapy?:  Curative            Current Plan      -Hodgkin's lymphoma      -Patient has had 2 separate episodes of Hodgkin's lymphoma.  She was originally     diagnosed in .  Was treated with ABVD for 3 cycles.  Achieved complete     response.      -Patient relapsed in .  Patient was treated with 6 cycles of ABVD with     radiation treatment of the residual lesion.      -Patient has palpable lymphadenopathy in the left supraclavicular lymph node     and left inguinal lymphadenopathy.      -Patient was seen for urgent care visit for worsening back pain with B     symptoms.  We discussed the need for PET CT since there is a strong concern for     Hodgkin's lymphoma recurrence..      -PET/CT in 2017 did not show evidence of cancer recurrence.      -Currently in remission.  Still has persistent inguinal lymphadenopathy and     neck lymphadenopathy      -No new lymphadenopathy on today's exam.      -No signs and symptoms of recurrence.  No B symptoms            -Right lower extremity cellulitis      -Patient was seen clinic.  Was noted to have erythematous and warm to touch     right thigh with an open pustule.      -Counseled patient to go to an emergency room for consideration of inpatient     antibiotics.      -Improved with antibiotics.  Continue antibiotics prescribed by primary care     physician      -Resolved            -Decreased appetite      -Orders placed for Marinol 5 mg            -Interval Clinic Visit      -Refill on Xanax.       -RTC in one month due to worsening appetite and persistent lymphadenopathy      -Continue  observation.            Medical Evaluation of Acute and Chronic Medical Conditions today      -Pulmonary nodule      -Right lower lobe pulmonary nodule is noted.        -Orders placed for follow-up CT in 6 months.      -Stable..       -Heart Murmur      -Patient will be followed by cardiology.      -No chest pain or shortness of breath today.      -Back pain      -Acute visit for worsening back pain.      -Patient has no incontinence or bowel problems concerning for Cauda Equina.        -Patient is on a Dilaudid pain pump managed by [facility]     group.       -Patient was given a short acting hydrocodone acetaminophen for breakthrough     pain.      -Continue follow-up with pain medicine clinic.        -Anxiety      -Well controlled currently.      -Continue current medications-      -Refill Xanax, 0.5 mg BID, # 60 tabs, no refills.            Medical Evaluation of Cancer Associated Symptoms today      -Cancer associated pain      -Patient's pain has remained stable.      -Continue current pain regimen.      -Cancer associated muscle weakness      -Patient's weakness has remained stable.      -Continue close observation.      -Cancer associated fatigue      -Patient's fatigue has remained stable.      -Recommended exercise.      -Anxiety due to cancer      -Patient's anxiety is well controlled today.       -Continue current management.       -Today's Evaluation      -Patient's imaging exams, blood tests, physicians' notes, and any new findings     since our last visit were reviewed today to reassess patient's medical     treatment plan. .       -Old medical records were reviewed and summarized in chronological order in the     HPI today to maintain an updated medical record.       -Patient's radiology imaging tests from our last visit were reviewed     independently by me by direct visualization of the images.        -Patients current lab tests and medications were carefully reviewed to evaluate      patient's current treatment plan today.       -Patient was advised to call us right away if there are any new symptoms for an     urgent visit for further evaluation. Patient voiced understanding and agreed to     do so.      Hodgkin disease - C81.90            Notes      New Medications      * ALPRAZolam (Xanax) 1 MG TABLET: 1 MG PO BID      New Diagnostics      * CBC, As Soon As Possible       Dx: Hodgkin lymphoma, lymphocyte-rich - C81.00      * Comp Metabolic Panel, As Soon As Possible       Dx: Hodgkin lymphoma, lymphocyte-rich - C81.00      * Sed Rate, As Soon As Possible       Dx: Hodgkin lymphoma, lymphocyte-rich - C81.00      * LDH, As Soon As Possible       Dx: Hodgkin lymphoma, lymphocyte-rich - C81.00      Time Spent:  > 50% /Coord Care      Patient Education Provided:  Yes            ECOG      ECOG Performance Status:  3            History and Present Illness      This is a very pleasant 51-year-old female who presents to oncology clinic for     new symptoms of drenching night sweats, fever, and 15 pound weight loss in the     last 6 months.            -August .  Patient was diagnosed with nodular sclerosing Hodgkin's     lymphoma.  This was from a suspicious right groin mass which was resected by     Dr. Peña.  She had clinical stage III involvement of the mediastinal, right     groin, left axilla, and mild splenomegaly.  Patient received 3 cycles of ABVD.      She was recommended 6 cycles but could not tolerate treatment.  On follow-up     study she did have a complete response.      -February 2011 to October .  Patient presented with right groin     lymphadenopathy with B symptoms of drenching night sweats, fever, and 10 pound     weight loss.  Biopsy yielded a diagnosis of recurrent Hodgkin's disease.      Patient was seen at the [facility] bone marrow transplant unit.      Patient then underwent 4 additional cycles of ABVD.  An ABVD without bleomycin     for 2  cycles.  Last chemotherapy was on October .  Patient had residual     solitary right inguinal lymph node which was PET avid with an SUV of 5.6.      Patient was scheduled to receive consolidative radiation therapy to the right     iliac chain of lymph nodes.  30.6 denisse to the entire area.      -2011 through 2012..  Per patient was diagnosed with Hodgkin's lymphoma.      Patient was treated with      -February  through March .  Patient initially had a right     inguinal extra iliac and internal iliac lymph nodes treated with 30.6 denisse in     17 fractions.      -January .  Patient was seen as an urgent visit for back pain.  No     neurologic deficits.  Patient has normal bowel function and no urinary     incontinence concerning for cauda equina.        -February 3, 2017- has increased itching all over, bilateral leg edema/itching     she has had x 9 months. needs refill for anxiety med until she is able to get     into pain management for pain pump mgmt and pain management. PET ordered but no     date yet.       -February .  PET/CT showed persistent pulmonary nodule in the right     lower lobe.  Follow-up CT of the chest in 6 months recommended.  No lesions     concerning for cancer recurrence.      -June .  CT neck showed several small cervical lymph nodes.  Stable in     size.      -September 1-2017.  CT neck, chest, abdomen, and pelvis showed several non-    pathologically enlarged lymph nodes seen within the chest, abdomen, and pelvis.     The lymph nodes in the abdomen and pelvis appear stable. Hepatosplenomegaly.      -October .  Admitted for right lower extremity cellulitis.      -December 12, 2017. PET CT Skull to Base of mid thigh -  Deauville category 1.     No evidence of adenopathy hypermetabolic activity. Coronary artery and aortic     valvular calcification.       -January .  CT chest showed no evidence of acute intrathoracic     abnormality.   "Stable granulomatous inflammatory disease.  CT abdomen and pelvis     showed hepatic steatosis.            Vitals      Height 5 ft 10.00 in / 177.8 cm      Weight 170 lbs 6.649 oz / 77.3 kg      BSA 1.96 m2      BMI 24.5 kg/m2      Temperature 98.1 F / 36.72 C - Temporal      Pulse 81      Blood Pressure 117/71 Sitting, Left Arm      Pulse Oximetry 97%, ROOM AIR            Allergies      Coded Allergies:             AMOXICILLIN (Verified  Allergy, Severe, ANAPHALAX, 3/7/18)           PENICILLINS (Verified  Allergy, Severe, TROUBLE BREATHING, 3/7/18)           SULFA (SULFONAMIDE ANTIBIOTICS) (Verified  Allergy, Severe, TIGHTNESS IN     THROAT, TROUBLE BREATHING,        CEFAZOLIN (Verified  Allergy, Unknown, SWELLING IN LIPS, 3/7/18)           CEPHALEXIN (Verified  Allergy, Unknown, SWELLING IN LIPS, 3/7/18)           CEPHALOSPORINS (Verified  Allergy, Unknown, SWELLING IN LIPS, 3/7/18)           CLARITHROMYCIN (Verified  Allergy, Unknown, UNKNOWN, 3/7/18)           DIPHENHYDRAMINE HCL (Verified  Allergy, Unknown, UNK, 3/7/18)           DOXYCYCLINE (Verified  Allergy, Unknown, \"pt reports red bumps on my eyes     and makes them swell\", 3/7/18)           IODINE AND IODIDE CONTAINING PRODUC (Verified  Allergy, Unknown, HIVES,     ITCHING, 3/7/18)           PROCHLORPERAZINE (Verified  Allergy, Unknown, HIVES, ITCHING, 3/7/18)           PROMETHAZINE (Verified  Allergy, Unknown, HIVES, ITCHING, 3/7/18)           ASPIRIN (Verified  Adverse Reaction, Unknown, \"INTERACTS WITH COUMADIN\", 3/    7/18)           EGG (Verified  Adverse Reaction, Unknown, VOMITING, 3/7/18)           IODINATED CONTRAST- ORAL AND IV DYE (Verified  Adverse Reaction, Unknown,     SEVERE PAIN IN EXTREMITY IN WHICH MEDICATION WAS GIVEN , 3/7/18)           LORAZEPAM (Verified  Adverse Reaction, Unknown, SEVERE DREAMS, 3/7/18)           NSAIDS (NON-STEROIDAL ANTI-INFLAMMA (Verified  Adverse Reaction, Unknown,     LEG CRAMPS, 3/7/18)           ZOLPIDEM " TARTRATE (Verified  Adverse Reaction, Unknown, MAKES VERY     EMOTIONAL, 3/7/18)            Medications      Last Reconciled on 3/9/18 07:24 by KORINA SHEPARD MD      ALPRAZolam (Xanax) 1 Mg Tablet      1 MG PO BID, TAB         Reported         3/7/18       Acetaminophen* (Tylenol*) 325 Mg Tablet      650 MG PO Q4H Y for PAIN OR FEVER, #100 TAB 0 Refills         Reported         12/28/17       Hydromorphone HCl (Dilaudid) Unknown Strength Ampul      CONTINUOUS, AMP         Reported         10/12/17            General Information      Primary Provider:  WENDIE NOYOLA            Chemo Status      On Oral Chemotherapy?:  No            Other      Clinical Trial Participant?:  No            Past Medical History      No Diabetes Type 1, No Diabetes Type 2, No Thyroid Disease, No COPD, No     Emphysema, No Hypertension, No Stroke, No High Cholesterol, No Heart Attack, No     Bleeding Condition, No Low or High RBC Count, No Low or High WBC Count, No Low     or High Platelet Coun, No Hepatitis, No Kidney Disease, No Depression, No     Alzheimer's Disease, No Mental Disease, No Seizures, No Arthritis, No     Osteoporosis, No Osteopenia, No Short of Air, No Sleep apnea, No Liver Disease,     No STD, No Enlarged Prostate, No Other      Hematology/oncology:  REPORTS HX OF: Lymphoma (hodgkins), DENIES HX OF:     Previous Treatment for CA, Anemia, Bladder Cancer, Blood cancer, Brain cancer,     Breast cancer, Cervical cancer, Coagulopathy, Colorectal cancer, Endocrine     cancer, Eye cancer, GI cancer,  cancer, Kidney cancer, Leukemia, Leukocytosis    , Leukopenia, Liver cancer, Lung cancer, Musculoskeletal cancer, Myeloma,     Neurologic cancer, Oral cancer, Ovarian cancer, Skin cancer, Stomach cancer,     Thrombocytopenia, Thyroid cancer, Uterine cancer, Other cancer history, Other     hematologic history      Genetic/metabolic:  DENIES HX OF: Cystic fibrosis, Down syndrome, Other genetic     history, Other metabolic history             Past Surgical History      REPORTS HX OF: Cholecystectomy, Biopsy (shoulder, lower back surgery), Other     Past Surgical Hx (tonsilectomy, tumor removed from vaginal area), DENIES HX OF:     Cataract extraction, Thyroid surgery, Lung biopsy, CABG surgery, Coronary stent    , Valve replacement, Appendectomy, Splenectomy, Bladder surgery, Nephrectomy,     Joint replacement, Frature repair, Skin cancer removal, Melanoma excision,     Spinal surgery, Breast biopsy, Lumpectomy, Mastectomy, bilateral, Mastectomy,     right, Mastectomy, left, Hysterectomy, Peg Tube Placement, VAD Placement            Family History      REPORTS HX OF: Kidney Cancer (mother), Other Cancer History (panceratic ca,     GRANDMOTHER, SISTER, AND FATHER), DENIES HX OF: Anemia, Blood disorders, Blood     Cancer, Breast cancer, Cervical cancer, Coagulopathy, Colorectal cancer,     Endocrine Cancer, Eye Cancer, GI Cancer,  Cancer, Leukemia, Leukocytosis,     Leukopenia, Liver Cancer, Lung cancer, Lymphoma, Melanoma, Musculoskeletal     Cancer, Myeloma, Neurologic Cancer, Oral Cancer, Ovarian cancer, Prostate cancer    , Skin Cancer, Stomach Cancer, Testicular Cancer, Thrombocytopenia, Thyroid     cancer, Uterine cancer, Other Hematology History            Social History      Yes      DISABLED            Tobacco Use      Tobacco status:  Current every day smoker      Smoking packs/day:  1      Smoking history:  25-50 pack years            Alcohol Use      Alcohol intake:  None            Substance Use      Substance use:  Denies use            ROS      General:  Denies: Appetite change, Excessive sweating, Fatigue, Fever, Night     sweats, Weight gain, Weight loss, Other      Eyes:  Denies: Blurred vision, Corrective lenses, Diplopia, Eye irritation, Eye     pain, Eye redness, Spots in vision, Vision loss, Other      Ears, nose, mouth, throat:  Denies: Headache, Seizures, Visual Changes, Hearing     loss, Sinus Congestion, Hoarseness,  Sore throat, Other      Cardiovascular:  Denies: Chest pain, Irregular heartbeat, Palpitations, Swollen     ankles/legs, Other      Respiratory:  Denies: Chest pain, Shortness of Air, Productive cough, Coughing     blood, Other      Gastrointestinal:  Denies: Nausea, Vomiting, Problem swallowing, Frequent     heartburn, Constipation, Diarrhea, Tarry stools, Bloody stools, Unable to     control bowels, Other      Kidney/Bladder:  Denies: Painful Urination, Change in urinary stream, Blood in     urine, Incontinence, Frequent Urination, Decreased urine stream, Other      Musculoskeletal:  Denies: New Back pain, Leg Cramps, Painful Joints, Swollen     Joints, Muscle Pain, Muscle weakness, Other      Skin:  DENIES: Jaundice, Easy Bleeding, Lesions/changes in moles, Nail changes,     Skin Discoloration, Rash, Other      Neurological:  Denies: Dizziness, Fainting, Numbness\Tingling, Paralysis,     Seizures, Other      Psychiatric:  Complains of: AAO X 3, Denies: Anxiety, Panic attacks, Depression    , Memory loss, Other      Endocrine:  DiabetesThyroid DisorderOsteoporosisEndocrine Other      Hematologic/lymphatic:  Denies: Bruising, Bleeding, Enlarged Lymph Nodes,     Recurrent infections, Other      Reproductive:  Denies Pregnant, Denies Menopause, Denies Still Menstruating,     Denies Heavy Periods, Denies Other            Vitals            Vital Signs              Date Time  Temp Pulse Resp B/P (MAP) Pulse Ox O2 Delivery O2 Flow Rate FiO2             2/8/18 13:31 97.6 100  102/62 93 ROOM AIR              General Appearance:  Alert, Oriented X3, Cooperative, No acute distress      Eyes:  Anicteric Sclerae, Moist Conjunctiva, PERRLA      HEENT:  Orophraynx clear, No Erythema, No Exudates      Neck:  Supple, Full ROM, No Masses or JVD      Respiratory:  CTAB, Diminished Breath, No Rales, No Crackels, No Stridor, No     Rhonchi      Breast\Chest:  Symmetrical, No Nipple Discharge, No Masses      Abdomen\Gastro:  Soft, No  NABS, No Masses, No Hernias, No Hepatosplenomegaly      Cardio:  No RRR, No Murmur, No, Peripheral Edema, Normal PMI, Other (heart     murmur present. )      Skin:  Normal Temperature, Normal Tone, Normal Texture and Turgor, No Rash,     lesions, ulcers      Psychiatric:  Appropriate Affect, Intact Judgement, AAO x3      Neuro:  Cranial Nerve II-XII Inta, No Focal Sensory Deficit      Genitourinary:  No Keating Catheter, No Bladder Distention      Muscularskeletal:  Normal Gait and Station, Full ROM of extremeties, Full     muscle strength\tone      Extremities:  No Digital Cyanosis, No Digital Ischemia, Pedal Pulses Intact,     Pedal Pulses Symetrical, Weakness, Other (right lower extremity erythema and     pain.)      Lymphatic:  No Cervical, No Supraclavicular, No Infraclavicular, No Axillary,     No Inguinal            Hx Influenza Vaccination:  No      Influenza Vaccine Declined:  Yes      2 or More Falls Past Year?:  No      Fall Past Year with Injury?:  No      Hx Pneumococcal Vaccination:  No      Encouraged to follow-up with:  PCP regarding preventative exams.      Chart initiated by      KEYANNA NEELY CMA                 Disclaimer: Converted document may not contain table formatting or lab diagrams. Please see [employer] for the authenticated document.

## 2021-05-28 NOTE — PROGRESS NOTES
Patient: RENÉE ABARCA     Acct: [id number]     Report: #[id number]  UNIT #: [id number]     : 1965    Encounter Date:2018  PRIMARY CARE: Lety Schulz  ***Signed***  --------------------------------------------------------------------------------------------------------------------  ADDENDUM: 18 0000          Addendum: Tano Olsen on 18 @ 23:58                   -Decreased Mobility      -Patient has difficulty walking with cane, walker, and regular wheel chair is     not enough for mobility.        -Will need to consider PT evaluation             -Wound on Legs      -Patient will need a referral to wound center.       -Orders placed.      Chief Complaint      May 2, 2018      HODGKINS LYMPHOMA      Hodgkin Lymphoma; Remission since       Intent of Therapy?:  Curative            Current Plan      -Hodgkin's lymphoma      -Patient has had 2 separate episodes of Hodgkin's lymphoma.  She was originally     diagnosed in .  Was treated with ABVD for 3 cycles.  Achieved complete     response.      -Patient relapsed in .  Patient was treated with 6 cycles of ABVD with     radiation treatment of the residual lesion.      -Patient has palpable lymphadenopathy in the left supraclavicular lymph node     and left inguinal lymphadenopathy.      -Patient was seen for urgent care visit for worsening back pain with B     symptoms.  We discussed the need for PET CT since there is a strong concern for     Hodgkin's lymphoma recurrence..      -PET/CT in 2017 did not show evidence of cancer recurrence.      -Currently in remission.  Still has persistent inguinal lymphadenopathy and     neck lymphadenopathy      -No new lymphadenopathy on today's exam.      -No signs and symptoms of recurrence.  No B symptoms      -Cured Lymphoma; Remission since 2013.             -Right lower extremity cellulitis      -Patient was seen clinic.  Was noted to have erythematous and warm to touch      right thigh with an open pustule.      -Counseled patient to go to an emergency room for consideration of inpatient     antibiotics.      -Improved with antibiotics.  Continue antibiotics prescribed by primary care     physician      -Resolved      -Boils right thigh; not cancer related prescribed steroid cream.             -Interval Clinic Visit      -Refill Xanax; anxiety      -Follow up with Survivorship clinic; [facility] only            Medical Evaluation of Acute and Chronic Medical Conditions today      -Right hip pain      -Per patient had trauma to the area      -Pain with tenderness      -Order right hip x-ray      -PCP+ pain management; not cancer related      -Anxiety      -Well controlled currently.      -Continue current medications-      -Refill Xanax, 0.5 mg BID, # 60 tabs, no refills.            Medical Evaluation of Cancer Associated Symptoms today      -Cancer associated pain      -Patient's pain has remained stable.      -Continue current pain regimen.      -Cancer associated muscle weakness      -Patient's weakness has remained stable.      -Continue close observation.      -Cancer associated fatigue      -Patient's fatigue has remained stable.      -Recommended exercise.      -Anxiety due to cancer      -Patient's anxiety is well controlled today.       -Continue current management.       -Today's Evaluation      -Patient's imaging exams, blood tests, physicians' notes, and any new findings     since our last visit were reviewed today to reassess patient's medical     treatment plan. .       -Old medical records were reviewed and summarized in chronological order in the     HPI today to maintain an updated medical record.       -Patient's radiology imaging tests from our last visit were reviewed     independently by me by direct visualization of the images.        -Patients current lab tests and medications were carefully reviewed to evaluate     patient's current treatment plan today.        -Patient was advised to call us right away if there are any new symptoms for an     urgent visit for further evaluation. Patient voiced understanding and agreed to     do so.      Hodgkin lymphoma - C81.90            Notes      New Medications      * Triamcinolone Acetonide 0.025% Cream 15 GM CREAM..G.: 1 APL TOPICAL QDAY 7     Days #15      Time Spent:  > 50% /Coord Care      Patient Education Provided:  Yes            ECOG      ECOG Performance Status:  3            History and Present Illness      This is a very pleasant 52-year-old female who presents to oncology clinic for     new symptoms of drenching night sweats, fever, and 15 pound weight loss in the     last 6 months.            -August .  Patient was diagnosed with nodular sclerosing Hodgkin's     lymphoma.  This was from a suspicious right groin mass which was resected by     Dr. Peña.  She had clinical stage III involvement of the mediastinal, right     groin, left axilla, and mild splenomegaly.  Patient received 3 cycles of ABVD.      She was recommended 6 cycles but could not tolerate treatment.  On follow-up     study she did have a complete response.      -February 2011 to October .  Patient presented with right groin     lymphadenopathy with B symptoms of drenching night sweats, fever, and 10 pound     weight loss.  Biopsy yielded a diagnosis of recurrent Hodgkin's disease.      Patient was seen at the [facility] bone marrow [facility] unit.      Patient then underwent 4 additional cycles of ABVD.  An ABVD without bleomycin     for 2 cycles.  Last chemotherapy was on October .  Patient had residual     solitary right inguinal lymph node which was PET avid with an SUV of 5.6.      Patient was scheduled to receive consolidative radiation therapy to the right     iliac chain of lymph nodes.  30.6 denisse to the entire area.      -2011 through 2012..  Per patient was diagnosed with Hodgkin's lymphoma.       Patient was treated with      -February  through March .  Patient initially had a right     inguinal extra iliac and internal iliac lymph nodes treated with 30.6 denisse in     17 fractions.      -January .  Patient was seen as an urgent visit for back pain.  No     neurologic deficits.  Patient has normal bowel function and no urinary     incontinence concerning for cauda equina.        -February 3, 2017- has increased itching all over, bilateral leg edema/itching     she has had x 9 months. needs refill for anxiety med until she is able to get     into pain management for pain pump mgmt and pain management. PET ordered but no     date yet.       -February .  PET/CT showed persistent pulmonary nodule in the right     lower lobe.  Follow-up CT of the chest in 6 months recommended.  No lesions     concerning for cancer recurrence.      -June .  CT neck showed several small cervical lymph nodes.  Stable in     size.      -September 1-2017.  CT neck, chest, abdomen, and pelvis showed several non-    pathologically enlarged lymph nodes seen within the chest, abdomen, and pelvis.     The lymph nodes in the abdomen and pelvis appear stable. Hepatosplenomegaly.      -October .  Admitted for right lower extremity cellulitis.      -December 12, 2017. PET CT Skull to Base of mid thigh -  Deauville category 1.     No evidence of adenopathy hypermetabolic activity. Coronary artery and aortic     valvular calcification.       -January .  CT chest showed no evidence of acute intrathoracic     abnormality.  Stable granulomatous inflammatory disease.  CT abdomen and pelvis     showed hepatic steatosis.      -May 2, 2018.  Requested follow up for skin issues that appear to be boils not     cancer related.  Discussed future follow up with Survivorship clinic , cancer     free for 5 years.  Focus on survivorship and not cancer.            Vitals      Height 5 ft 10.00 in / 177.8 cm     "  Weight 169 lbs 5.012 oz / 76.8 kg      BSA 1.95 m2      BMI 24.3 kg/m2      Temperature 98.2 F / 36.78 C - Temporal      Pulse 75      Blood Pressure 133/60 Sitting, Left Arm      Pulse Oximetry 98%, ROOM AIR            Allergies      Coded Allergies:             AMOXICILLIN (Verified  Allergy, Severe, ANAPHALAX, 5/2/18)           PENICILLINS (Verified  Allergy, Severe, TROUBLE BREATHING, 5/2/18)           SULFA (SULFONAMIDE ANTIBIOTICS) (Verified  Allergy, Severe, TIGHTNESS IN     THROAT, TROUBLE BREATHING,        CEFAZOLIN (Verified  Allergy, Unknown, SWELLING IN LIPS, 5/2/18)           CEPHALEXIN (Verified  Allergy, Unknown, SWELLING IN LIPS, 5/2/18)           CEPHALOSPORINS (Verified  Allergy, Unknown, SWELLING IN LIPS, 5/2/18)           CLARITHROMYCIN (Verified  Allergy, Unknown, UNKNOWN, 5/2/18)           DIPHENHYDRAMINE HCL (Verified  Allergy, Unknown, UNK, 5/2/18)           DOXYCYCLINE (Verified  Allergy, Unknown, \"pt reports red bumps on my eyes     and makes them swell\", 5/2/18)           IODINE AND IODIDE CONTAINING PRODUC (Verified  Allergy, Unknown, HIVES,     ITCHING, 5/2/18)           PROCHLORPERAZINE (Verified  Allergy, Unknown, HIVES, ITCHING, 5/2/18)           PROMETHAZINE (Verified  Allergy, Unknown, HIVES, ITCHING, 5/2/18)           ASPIRIN (Verified  Adverse Reaction, Unknown, \"INTERACTS WITH COUMADIN\", 5/ 2/18)           EGG (Verified  Adverse Reaction, Unknown, VOMITING, 5/2/18)           IODINATED CONTRAST- ORAL AND IV DYE (Verified  Adverse Reaction, Unknown,     SEVERE PAIN IN EXTREMITY IN WHICH MEDICATION WAS GIVEN , 5/2/18)           LORAZEPAM (Verified  Adverse Reaction, Unknown, SEVERE DREAMS, 5/2/18)           NSAIDS (NON-STEROIDAL ANTI-INFLAMMA (Verified  Adverse Reaction, Unknown,     LEG CRAMPS, 5/2/18)           ZOLPIDEM TARTRATE (Verified  Adverse Reaction, Unknown, MAKES VERY     EMOTIONAL, 5/2/18)            Medications      Last Reconciled on 5/4/18 19:45 by KORINA CRUZ" MD DREA      Triamcinolone Acetonide 0.025% Cream (Triamcinolone Acetonide 0.025% Cream) 15     Gm Cream..g.      1 APL TOPICAL QDAY for 7 Days, #15 GM         Prov: Tano Olsen         5/2/18       Nystatin (Nystatin*) 15 Gm Powder      1 APL TOPICAL QID for 14 Days, #1 GM         Prov: GINNY JOHN onc         4/5/18       ALPRAZolam (Xanax) 1 Mg Tablet      1 MG PO BID, TAB         Reported         3/7/18       Acetaminophen* (Tylenol*) 325 Mg Tablet      650 MG PO Q4H Y for PAIN OR FEVER, #100 TAB 0 Refills         Reported         12/28/17       Hydromorphone HCl (Dilaudid) Unknown Strength Ampul      CONTINUOUS, AMP         Reported         10/12/17            General Information      Primary Provider:  WENDIE NOYOLA            Pain and Fatigue Scales      Pain Assessment:           Experiencing any pain?:  Yes         Pain Scale Used:  Numerical         Pain Intensity:  8         Pain Location:  Back         Description:  Aching         Duration:  Continuous            Chemo Status      On Oral Chemotherapy?:  No            Other      Clinical Trial Participant?:  No            PAST, FAMILY   Past Medical History      No Diabetes Type 1, No Diabetes Type 2, No Thyroid Disease, No COPD, No     Emphysema, No Hypertension, No Stroke, No High Cholesterol, No Heart Attack, No     Bleeding Condition, No Low or High RBC Count, No Low or High WBC Count, No Low     or High Platelet Coun, No Hepatitis, No Kidney Disease, No Depression, No     Alzheimer's Disease, No Mental Disease, No Seizures, No Arthritis, No     Osteoporosis, No Osteopenia, No Short of Air, No Sleep apnea, No Liver Disease,     No STD, No Enlarged Prostate, No Other      Hematology/oncology:  REPORTS HX OF: Lymphoma (hodgkins), DENIES HX OF:     Previous Treatment for CA, Anemia, Bladder Cancer, Blood cancer, Brain cancer,     Breast cancer, Cervical cancer, Coagulopathy, Colorectal cancer, Endocrine     cancer, Eye cancer, GI cancer,   cancer, Kidney cancer, Leukemia, Leukocytosis    , Leukopenia, Liver cancer, Lung cancer, Musculoskeletal cancer, Myeloma,     Neurologic cancer, Oral cancer, Ovarian cancer, Skin cancer, Stomach cancer,     Thrombocytopenia, Thyroid cancer, Uterine cancer, Other cancer history, Other     hematologic history      Genetic/metabolic:  DENIES HX OF: Cystic fibrosis, Down syndrome, Other genetic     history, Other metabolic history            Past Surgical History      REPORTS HX OF: Cholecystectomy, Biopsy (shoulder, lower back surgery), Other     Past Surgical Hx (tonsilectomy, tumor removed from vaginal area), DENIES HX OF:     Cataract extraction, Thyroid surgery, Lung biopsy, CABG surgery, Coronary stent    , Valve replacement, Appendectomy, Splenectomy, Bladder surgery, Nephrectomy,     Joint replacement, Frature repair, Skin cancer removal, Melanoma excision,     Spinal surgery, Breast biopsy, Lumpectomy, Mastectomy, bilateral, Mastectomy,     right, Mastectomy, left, Hysterectomy, Peg Tube Placement, VAD Placement            Family History      REPORTS HX OF: Kidney Cancer (mother), Other Cancer History (panceratic ca,     GRANDMOTHER, SISTER, AND FATHER), DENIES HX OF: Anemia, Blood disorders, Blood     Cancer, Breast cancer, Cervical cancer, Coagulopathy, Colorectal cancer,     Endocrine Cancer, Eye Cancer, GI Cancer,  Cancer, Leukemia, Leukocytosis,     Leukopenia, Liver Cancer, Lung cancer, Lymphoma, Melanoma, Musculoskeletal     Cancer, Myeloma, Neurologic Cancer, Oral Cancer, Ovarian cancer, Prostate cancer    , Skin Cancer, Stomach Cancer, Testicular Cancer, Thrombocytopenia, Thyroid     cancer, Uterine cancer, Other Hematology History            Social History      Lives independently:  Yes      Occupation:  DISABLED            Tobacco Use      Tobacco status:  Current every day smoker      Smoking packs/day:  1      Smoking history:  25-50 pack years            Alcohol Use      Alcohol intake:  None             Substance Use      Substance use:  Denies use            REVIEW OF SYSTEMS      General:  Denies: Appetite change, Excessive sweating, Fatigue, Fever, Night     sweats, Weight gain, Weight loss, Other      Eyes:  Denies: Blurred vision, Corrective lenses, Diplopia, Eye irritation, Eye     pain, Eye redness, Spots in vision, Vision loss, Other      Ears, nose, mouth, throat:  Complains of: Headache, Denies: Seizures, Visual     Changes, Hearing loss, Sinus Congestion, Hoarseness, Sore throat, Other      Cardiovascular:  Denies: Chest pain, Irregular heartbeat, Palpitations, Swollen     ankles/legs, Other      Respiratory:  Denies: Chest pain, Shortness of Air, Productive cough, Coughing     blood, Other      Gastrointestinal:  Denies: Nausea, Vomiting, Problem swallowing, Frequent     heartburn, Constipation, Diarrhea, Tarry stools, Bloody stools, Unable to     control bowels, Other      Kidney/Bladder:  Denies: Painful Urination, Change in urinary stream, Blood in     urine, Incontinence, Frequent Urination, Decreased urine stream, Other      Musculoskeletal:  Complains of: New Back pain, Denies: Leg Cramps, Painful     Joints, Swollen Joints, Muscle Pain, Muscle weakness, Other      Skin:  COMPLAINS OF: Skin Discoloration, DENIES: Jaundice, Easy Bleeding,     Lesions/changes in moles, Nail changes, Rash, Other (right inner thigh boils or     folliculitis)      Neurological:  Denies: Dizziness, Fainting, Numbness\Tingling, Paralysis,     Seizures, Other      Psychiatric:  Complains of: Anxiety, AAO X 3, Depression, Denies: Panic attacks    , Memory loss, Other      Endocrine:  DiabetesThyroid DisorderOsteoporosisEndocrine Other      Hematologic/lymphatic:  Denies: Bruising, Bleeding, Enlarged Lymph Nodes,     Recurrent infections, Other      Reproductive:  Denies Pregnant, Denies Menopause, Denies Still Menstruating,     Denies Heavy Periods, Denies Other            Vitals            Vital Signs               Date Time  Temp Pulse Resp B/P (MAP) Pulse Ox O2 Delivery O2 Flow Rate FiO2             4/5/18 10:42 97.5 80  112/71 100 ROOM AIR              General Appearance:  Alert, Oriented X3, Cooperative, No acute distress      Eyes:  Anicteric Sclerae, Moist Conjunctiva, PERRLA      HEENT:  Orophraynx clear, No Erythema, No Exudates      Neck:  Supple, Full ROM, No Masses or JVD      Respiratory:  CTAB, Diminished Breath, No Rales, No Crackels, No Stridor, No     Rhonchi      Breast\Chest:  Symmetrical, No Nipple Discharge, No Masses      Abdomen\Gastro:  Soft, No NABS, No Masses, No Hernias, No Hepatosplenomegaly      Cardio:  No RRR, No Murmur, No, Peripheral Edema, Normal PMI, Other (heart     murmur present. )      Skin:  Normal Temperature, Normal Tone, Normal Texture and Turgor, No Rash,     lesions, ulcers      Psychiatric:  Appropriate Affect, Intact Judgement, AAO x3      Neuro:  Cranial Nerve II-XII Inta, No Focal Sensory Deficit      Genitourinary:  No Ketaing Catheter, No Bladder Distention      Muscularskeletal:  Normal Gait and Station, Full ROM of extremeties, Full     muscle strength\tone      Extremities:  No Digital Cyanosis, No Digital Ischemia, Pedal Pulses Intact,     Pedal Pulses Symetrical, Weakness, Other (right lower extremity erythema and     pain.)      Lymphatic:  No Cervical, No Supraclavicular, No Infraclavicular, No Axillary,     No Inguinal            Lab Results      Laboratory Tests      4/25/18 20:30            PREVENTION      Hx Influenza Vaccination:  No      Influenza Vaccine Declined:  Yes      2 or More Falls Past Year?:  No      Fall Past Year with Injury?:  No      Hx Pneumococcal Vaccination:  No      Encouraged to follow-up with:  PCP regarding preventative exams.      Chart initiated by      KEYANNA NEELY CMA                 Disclaimer: Converted document may not contain table formatting or lab diagrams. Please see [employer] for the authenticated  document.

## 2021-05-28 NOTE — PROGRESS NOTES
Patient: RENÉE ABARCA     Acct: [id number]     Report: #[id number]  UNIT #: [id number]     : 1965    Encounter Date:2018  PRIMARY CARE: Lety Schulz  ***Signed***  --------------------------------------------------------------------------------------------------------------------  NURSE INTAKE      Visit Type      Established Patient Visit            Chief Complaint      hodgkins lymphoma      Intent of Therapy:  Curative            Referring Provider/Copies To      Primary Care Provider:  GANGA SANCHEZ            History and Present Illness      Past Oncology Illness History      This is a very pleasant 52-year-old female who presents to oncology clinic for     new symptoms of drenching night sweats, fever, and 15 pound weight loss in the     last 6 months.            -.  Patient was diagnosed with nodular sclerosing Hodgkin's     lymphoma.  This was from a suspicious right groin mass which was resected by Dr. Peña.  She had clinical stage III involvement of the mediastinal, right groin,     left axilla, and mild splenomegaly.  Patient received 3 cycles of ABVD.  She was    recommended 6 cycles but could not tolerate treatment.  On follow-up study she     did have a complete response.      -2011 to .  Patient presented with right groin     lymphadenopathy with B symptoms of drenching night sweats, fever, and 10 pound     weight loss.  Biopsy yielded a diagnosis of recurrent Hodgkin's disease.      Patient was seen at the [facility] bone marrow transplant unit.      Patient then underwent 4 additional cycles of ABVD.  An ABVD without bleomycin     for 2 cycles.  Last chemotherapy was on .  Patient had residual     solitary right inguinal lymph node which was PET avid with an SUV of 5.6.      Patient was scheduled to receive consolidative radiation therapy to the right     iliac chain of lymph nodes.  30.6 denisse  to the entire area.      -2011 through 2012..  Per patient was diagnosed with Hodgkin's lymphoma.      Patient was treated with      -February  through March .  Patient initially had a right inguinal    extra iliac and internal iliac lymph nodes treated with 30.6 denisse in 17     fractions.      -January .  Patient was seen as an urgent visit for back pain.  No     neurologic deficits.  Patient has normal bowel function and no urinary     incontinence concerning for cauda equina.        -February 3, 2017- has increased itching all over, bilateral leg edema/itching     she has had x 9 months. needs refill for anxiety med until she is able to get     into pain management for pain pump mgmt and pain management. PET ordered but no     date yet.       -February .  PET/CT showed persistent pulmonary nodule in the right lower    lobe.  Follow-up CT of the chest in 6 months recommended.  No lesions concerning    for cancer recurrence.      -June .  CT neck showed several small cervical lymph nodes.  Stable in     size.      -September 1-2017.  CT neck, chest, abdomen, and pelvis showed several non-    pathologically enlarged lymph nodes seen within the chest, abdomen, and pelvis.     The lymph nodes in the abdomen and pelvis appear stable. Hepatosplenomegaly.      -October .  Admitted for right lower extremity cellulitis.      -December 12, 2017. PET CT Skull to Base of mid thigh -  Deauville category 1.     No evidence of adenopathy hypermetabolic activity. Coronary artery and aortic     valvular calcification.       -January .  CT chest showed no evidence of acute intrathoracic abnorma    lity.  Stable granulomatous inflammatory disease.  CT abdomen and pelvis showed     hepatic steatosis.      -May 2, 2018.  Requested follow up for skin issues that appear to be boils not     cancer related.  Discussed future follow up with Survivorship clinic , cancer     free for 5 years.   Focus on survivorship and not cancer.        -August 8, 2018. Presents in follow up on her Stage III Hodgkins Lymphoma.  She     is > than 5 years out from treatment.  Has several comorbidities including     Aortic Stenosis, COPD, CVI, Anxiety and Depression, chronic back pain and     immobility.  Is undergoing cardiac cath 8/13/18. Heavy smoker.  Denies fever,     chills, night sweats and lymphadenopathy.            HPI - Oncology Interim      Presents to oncology clinic for urgent care visit for left breast tenderness and    drainage. Has not been seen since August for follow up for Hodgkin's lymphoma.      She is considered cured with her Hodgkin's lymphoma. Reports poor appetite and     worsening fatigue, but denies any B symptoms or enlarging adenopathy.              Reports expressing greenish yellow drainage from the left breast. She has     noticed some left axillary tenderness as well. Breast exam completed. Can not     appreciate any lumps, masses or axillary swelling.             She has a stye present to the left lower eyelid margin as well.             She has bilateral lower extremity swelling, redness and areas of open sores to     legs. She reports she is going to see dermatology for the lower extremity     swelling.             Reports pain to her pelvic region as well for the last 2 weeks.             She asked if I would prescribe her previous dose of Xanax today stating that Dr. Nichols is decreasing her dosage recently. Reports having a pain pump intact. I    instructed patient she needs to be seen by only one provider to give controlled     substances.             We will check a PET scan since patient has allergies to oral and IV contrast.     Will check labs as well.             Having valve replacement surgery in january 2019 at [facility].             She continues to smoke daily.            Clinical Staging      Stage III Hodgkins            Clinical Trial Participant      No             ECOG Performance Status      3            PAST, FAMILY   Past Medical History      Past Medical History:  Depression      Other PMH      chronic pain      Hematology/Oncology (F):  Anemia, Lymphoma (HODGKIKS)      Other Hematology/Oncology      history of Hodgkins lymphoma            Past Surgical History      Biopsy, VAD Placement            Family History      Family History:  Lymphoma            Social History      Lives independently:  Yes      Occupation:  DISABLED            Tobacco Use      Tobacco status:  Current every day smoker      Smoking packs/day:  1      Smoking history:  25-50 pack years            Alcohol Use      Alcohol intake:  None            Substance Use      Substance use:  Denies use            REVIEW OF SYSTEMS      General:  Admits: Fatigue;          Denies: Appetite Change, Fever, Night Sweats, Weight Loss      Eye:  Admits: Eye Redness (lefrt eye);          Denies: Diplopia, Vision Loss      ENT:  Admits: Headache;          Denies: Hearing Loss      Cardiovascular:  Admits: Edema Ankles;          Denies: Chest Pain, Edema Legs, Irregular Heartbeat      Respiratory:  Denies: Coughing Blood, Productive Cough, Shortness of Air      Gastrointestinal:  Denies: Constipation, Diarrhea, Nausea, Problem Swallowing      Genitourinary (female):  Denies: Blood in Urine, Decrease Urine Stream      Musculoskeletal:  Admits: Muscle Weakness, Painful Joints;          Denies: Back Pain, Leg Cramps      Integumentary:  Denies: Bleeds Easily, Bruises Easily, Skin Discoloration      Neurologic:  Denies: Dizziness, Fainting, Numbness\Tingling      Psychiatric:  Admits: Anxiety;          Denies: Confused      Endocrine:  Denies: Cold Intolerance, Diabetes, Excessive Sweating      Hematologic/Lymphatic:  Denies: Bruising, Bleeding      Reproductive:  Admits: Menopause            VITAL SIGNS AND SCORES      Vitals      Height 5 ft 10.00 in / 177.8 cm      Weight 170 lbs  / 77.779107 kg      BSA 1.95 m2       BMI 24.4 kg/m2      Temperature 97.5 F / 36.39 C - Temporal      Pulse 89      Respirations 18      Blood Pressure 119/75 Sitting, Left Arm      Pulse Oximetry 99%, RM AIR            Pain Score      Pain Scale Used:  Numerical      Pain Intensity:  7            Fatigue Score      Experiencing any fatigue?:  Yes      Fatigue (0-10 scale):  7            EXAM      General Appearance:  Positive for: Alert, Oriented x3, Cooperative      Eye:  Positive for: Moist Conjunctiva, Reactive to Light      HEENT:  Positive for: Oropharynx clear;          Negative for: Erythema, Exudates      Neck:  Positive for: Full ROM;          Negative for: Carotid Bruits      Respiratory:  Positive for: CTAB, Diminished Breath, Normal Respiratory Effort      Breast - Left:  Positive for: Discharge (greenish brownish discharge from the     left breast. );          Negative for: Adenopathy, Appearance, Mass, Skin Changes      Breast - Right:  Negative for: Adenopathy, Appearance, Discharge, Mass, Skin     Changes      Chest:  Port in Place      Abdomen/Gastro:  Positive for: Normal Active Bowel Sounds, Soft;          Negative for: Distention, Tenderness      Cardiovascular:  Positive for: Murmur, Peripheral Edema, RRR;          Negative for: Click, Distant Heart Sounds      Skin:  Positive for: Normal Temperature, Normal Texture and Turgor, Normal Tone;             Negative for: Induration, Rash      Psychiatric:  Positive for: AAO X 3, Appropriate Affect, Intact Judgement      Neurologic:  Positive for: Cranial Ner II-XII Intact, Headache;          Negative for: Deep Tendon Reflexes, Dizziness      Genitourinary:  Negative for: Bladder Distention, Cervical Tenderness      Musculoskeletal:  Positive for: Full ROM Lower Extremety, Full ROM Upper Ext    remety, Full Muscle Strength, Full Muscle Tone      Lower Extremities:  Positive for: Normal Gait and Station, Pedal Pulses Intact,     Pedal Pulses Symetrical, Weakness      Upper Extremities:  " Negative for: Digital Cyanosis, Digital Ischemia      Lymphatic:  Negative for: Axillary, Cervical, Posterior auricular, Preauricular,    Supraclavicular            PREVENTION      Hx Influenza Vaccination:  No      Influenza Vaccine Declined:  Yes      2 or More Falls Past Year?:  No      Fall Past Year with Injury?:  No      Hx Pneumococcal Vaccination:  No      Encouraged to follow-up with:  PCP regarding preventative exams.      Chart initiated by      KELLY PAGE MA            ALLERGY/MEDS      Allergies      Coded Allergies:             AMOXICILLIN (Verified  Allergy, Severe, ANAPHALAX, 12/14/18)           PENICILLINS (Verified  Allergy, Severe, TROUBLE BREATHING, 12/14/18)           SULFA (SULFONAMIDE ANTIBIOTICS) (Verified  Allergy, Severe, TIGHTNESS IN     THROAT, TROUBLE BREATHING,        CEFAZOLIN (Verified  Allergy, Unknown, SWELLING IN LIPS, 12/14/18)           CEPHALEXIN (Verified  Allergy, Unknown, SWELLING IN LIPS, 12/14/18)           CEPHALOSPORINS (Verified  Allergy, Unknown, SWELLING IN LIPS, 12/14/18)           CLARITHROMYCIN (Verified  Allergy, Unknown, UNKNOWN, 12/14/18)           DIPHENHYDRAMINE HCL (Verified  Allergy, Unknown, UNK, 12/14/18)           DOXYCYCLINE (Verified  Allergy, Unknown, \"pt reports red bumps on my eyes     and makes them swell\", 12/14/18)           IODINE AND IODIDE CONTAINING PRODUC (Verified  Allergy, Unknown, HIVES,     ITCHING, 12/14/18)           PROCHLORPERAZINE (Verified  Allergy, Unknown, HIVES, ITCHING, 12/14/18)           PROMETHAZINE (Verified  Allergy, Unknown, HIVES, ITCHING, 12/14/18)           ASPIRIN (Verified  Adverse Reaction, Unknown, \"INTERACTS WITH COUMADIN\",     12/14/18)           EGG (Verified  Adverse Reaction, Unknown, VOMITING, 12/14/18)           IODINATED CONTRAST- ORAL AND IV DYE (Verified  Adverse Reaction, Unknown,     SEVERE PAIN IN EXTREMITY IN WHICH MEDICATION WAS GIVEN , 12/14/18)           LORAZEPAM (Verified  Adverse Reaction, " Unknown, SEVERE DREAMS, 12/14/18)           NSAIDS (NON-STEROIDAL ANTI-INFLAMMA (Verified  Adverse Reaction, Unknown,     LEG CRAMPS, 12/14/18)           ZOLPIDEM TARTRATE (Verified  Adverse Reaction, Unknown, MAKES VERY     EMOTIONAL, 12/14/18)            Medications      Last Reconciled on 12/14/18 11:40 by GINNY JOHN      Levofloxacin (Levaquin*) 500 Mg Tablet      500 MG PO QDAY for 10 Days, #10 TAB 0 Refills         Prov: GINNY JOHN onc         12/14/18       Erythromycin (Erythromycin 0.5% Ophth*) 3.5 Gm Oint...g.      1 APL EYE EACH TID for 7 Days, #1 TUBE 1 Refill         Prov: GINNY JOHN onc         12/14/18       ALPRAZolam (Xanax) 0.25 Mg Tablet      0.25 MG PO TID PRN for ANXIETY, TAB 0 Refills         Reported         12/14/18       Gabapentin (Gabapentin) 250 Mg/5 Ml Solution      600 MG PO TID, #1080 ML         Reported         12/14/18       ALPRAZolam (Xanax) 1 Mg Tablet      0.5 MG PO BID, TAB         Reported         3/7/18       Acetaminophen (Tylenol) 325 Mg Tablet      650 MG PO Q4H PRN for PAIN OR FEVER, #100 TAB 0 Refills         Reported         12/28/17       Hydromorphone HCl (Dilaudid) Unknown Strength Ampul      CONTINUOUS, AMP         Reported         10/12/17      Medications Reviewed:  Changes made to meds            IMPRESSION/PLAN      Impression      -Hodgkin's lymphoma      -Patient has had 2 separate episodes of Hodgkin's lymphoma.  She was originally     diagnosed in 2008.  Was treated with ABVD for 3 cycles.  Achieved complete     response.      -Patient relapsed in 2011.  Patient was treated with 6 cycles of ABVD with radia    tion treatment of the residual lesion.      -Patient has palpable lymphadenopathy in the left supraclavicular lymph node and    left inguinal lymphadenopathy.      -Patient was seen for urgent care visit for worsening back pain with B symptoms.     We discussed the need for PET CT since there is a strong concern for Hodgkin's      lymphoma recurrence..      -PET/CT in December 2017 did not show evidence of cancer recurrence.      -Currently in remission.  Still has persistent inguinal lymphadenopathy and neck    lymphadenopathy      -No new lymphadenopathy on today's exam.      -No signs and symptoms of recurrence.  No B symptoms      -Cured Lymphoma; Remission since March 2013.             Stye to the left eye      -Stye to lower left lid margin      -Erythromycin apply to lower lid QID x 7 days.       -May use warm compresses and massage as well.             Left breast with discharge      Reports left breast with greenish yellow discharge for the last 2 weeks.       Reports left axillary tenderness.       Unable to stand for mammogram.       Will check bilateral breast US first.             -Right lower extremity cellulitis      -Patient was seen clinic.  Was noted to have erythematous and warm to touch     right thigh with an open pustule.      -Counseled patient to go to an emergency room for consideration of inpatient     antibiotics.      -Improved with antibiotics.  Continue antibiotics prescribed by primary care     physician      -Resolved      -Boils right thigh; not cancer related prescribed steroid cream.             Medical Evaluation of Acute and Chronic Medical Conditions today      -Right hip pain      -Per patient had trauma to the area      -Pain with tenderness      -Order right hip x-ray      -PCP+ pain management; not cancer related      -Anxiety      -Well controlled currently.      -Continue current medications-      -Refill Xanax, 0.5 mg BID, # 60 tabs, no refills.            Medical Evaluation of Cancer Associated Symptoms today      -Cancer associated pain      -Patient's pain has remained stable.      -Continue current pain regimen.      -Cancer associated muscle weakness      -Patient's weakness has remained stable.      -Continue close observation.      -Cancer associated fatigue      -Patient's fatigue has  remained stable.      -Recommended exercise.      -Anxiety due to cancer      -Patient's anxiety is well controlled today.       -Continue current management.       -Today's Evaluation      -Patient's imaging exams, blood tests, physicians' notes, and any new findings     since our last visit were reviewed today to reassess patient's medical treatment    plan. .       -Old medical records were reviewed and summarized in chronological order in the     HPI today to maintain an updated medical record.       -Patient's radiology imaging tests from our last visit were reviewed     independently by me by direct visualization of the images.        -Patients current lab tests and medications were carefully reviewed to evaluate     patient's current treatment plan today.       -Patient was advised to call us right away if there are any new symptoms for an     urgent visit for further evaluation. Patient voiced understanding and agreed to     do so.      Hodgkin lymphoma - C81.90            Diagnosis      Stye external - H00.019            Discharge of breast - N64.52            Swelling of both lower extremities - M79.89            Notes      New Medications      * Gabapentin 250 MG/5 ML SOLUTION: 600 MG PO TID #1080      * ALPRAZolam (Xanax) 0.25 MG TABLET: 0.25 MG PO TID PRN ANXIETY      * Erythromycin (Erythromycin 0.5% Ophth*) 3.5 GM OINT...G.: 1 APL EYE EACH TID 7      Days #1      * Levofloxacin (Levaquin*) 500 MG TABLET: 500 MG PO QDAY 10 Days #10      New Diagnostics      * Breast US, SCHEDULED PROCEDURE         Dx: Axillary tenderness - M79.629      * CMP Comp Metabolic Panel, Routine         Dx: Axillary tenderness - M79.629      * Iron Profile, Routine         Dx: Axillary tenderness - M79.629      * Thyroid Profile, Routine         Dx: Axillary tenderness - M79.629      * B12      Dx: Axillary tenderness - M79.629      * Ferritin Level, Routine         Dx: Axillary tenderness - M79.629      * LDH, Routine          Dx: Axillary tenderness - M79.629      * CBC With Auto Diff, Routine         Dx: Axillary tenderness - M79.629      * Path Review Peripheral Smear, Routine         Dx: Axillary tenderness - M79.629      * PET Skull Base Midthigh Init, Routine         Dx: Hodgkin lymphoma - C81.90            Plan      Labs today: CBC, CMP, LDH, iron panel, ferritin, B-12,folate      PET scan to evaluate for worsening lymphoma      Bilateral US of bilateral breasts to evaluate left breast discharge and axillary    tenderness      Levaquin sent for lower extremity sores to legs and cellulitis. She has multiple    allergies on allergy list.       Erthyromycin eye ointment to the left eye stye.       Follow up with Dr. Nichols for controlled substances prescribing.       Return in 2 weeks with follow up with Dr. Olsen.       Consider vascular consult for bilateral swelling if dermatology does not find     any abnormalities.            Patient Education      Patient Education Provided:  Yes            Topics Patient Counseled on      stye      breast mammograms.       controlled substances.                 Disclaimer: Converted document may not contain table formatting or lab diagrams. Please see [employer] [facility] for the authenticated document.

## 2021-05-28 NOTE — PROGRESS NOTES
"Patient: RENÉE ABARCA     Acct: [id number]     Report: #[id number]  UNIT #: [id number]     : 1965    Encounter Date:2018  PRIMARY CARE: Lety Schulz  ***Signed***  --------------------------------------------------------------------------------------------------------------------  Visit Type      Established Patient Visit            Chief Complaint      HODGKINS LYMPHOMA      Intent of Therapy:  Curative            Allergies      Coded Allergies:             AMOXICILLIN (Verified  Allergy, Severe, ANAPHALAX, 18)           PENICILLINS (Verified  Allergy, Severe, TROUBLE BREATHING, 18)           SULFA (SULFONAMIDE ANTIBIOTICS) (Verified  Allergy, Severe, TIGHTNESS IN     THROAT, TROUBLE BREATHING,        CEFAZOLIN (Verified  Allergy, Unknown, SWELLING IN LIPS, 18)           CEPHALEXIN (Verified  Allergy, Unknown, SWELLING IN LIPS, 18)           CEPHALOSPORINS (Verified  Allergy, Unknown, SWELLING IN LIPS, 18)           CLARITHROMYCIN (Verified  Allergy, Unknown, UNKNOWN, 18)           DIPHENHYDRAMINE HCL (Verified  Allergy, Unknown, UNK, 18)           DOXYCYCLINE (Verified  Allergy, Unknown, \"pt reports red bumps on my eyes     and makes them swell\", 18)           IODINE AND IODIDE CONTAINING PRODUC (Verified  Allergy, Unknown, HIVES,     ITCHING, 18)           PROCHLORPERAZINE (Verified  Allergy, Unknown, HIVES, ITCHING, 18)           PROMETHAZINE (Verified  Allergy, Unknown, HIVES, ITCHING, 18)           ASPIRIN (Verified  Adverse Reaction, Unknown, \"INTERACTS WITH COUMADIN\", 18)           EGG (Verified  Adverse Reaction, Unknown, VOMITING, 18)           IODINATED CONTRAST- ORAL AND IV DYE (Verified  Adverse Reaction, Unknown,     SEVERE PAIN IN EXTREMITY IN WHICH MEDICATION WAS GIVEN , 18)           LORAZEPAM (Verified  Adverse Reaction, Unknown, SEVERE DREAMS, 18)           NSAIDS (NON-STEROIDAL ANTI-INFLAMMA (Verified  " Adverse Reaction, Unknown,     LEG CRAMPS, 8/8/18)           ZOLPIDEM TARTRATE (Verified  Adverse Reaction, Unknown, MAKES VERY     EMOTIONAL, 8/8/18)            Medications      Last Reconciled on 8/8/18 13:44 by LUZ LIGHT      ALPRAZolam (Xanax) 1 Mg Tablet      1 MG PO BID, TAB         Reported         3/7/18       Acetaminophen* (Tylenol*) 325 Mg Tablet      650 MG PO Q4H Y for PAIN OR FEVER, #100 TAB 0 Refills         Reported         12/28/17       Hydromorphone HCl (Dilaudid) Unknown Strength Ampul      CONTINUOUS, AMP         Reported         10/12/17      Medications Reviewed:  No Changes made to meds            History and Present Illness      Past Oncology Illness History      This is a very pleasant 52-year-old female who presents to oncology clinic for     new symptoms of drenching night sweats, fever, and 15 pound weight loss in the     last 6 months.            -August .  Patient was diagnosed with nodular sclerosing Hodgkin's     lymphoma.  This was from a suspicious right groin mass which was resected by     Dr. Peña.  She had clinical stage III involvement of the mediastinal, right     groin, left axilla, and mild splenomegaly.  Patient received 3 cycles of ABVD.      She was recommended 6 cycles but could not tolerate treatment.  On follow-up     study she did have a complete response.      -February 2011 to October .  Patient presented with right groin     lymphadenopathy with B symptoms of drenching night sweats, fever, and 10 pound     weight loss.  Biopsy yielded a diagnosis of recurrent Hodgkin's disease.      Patient was seen at the [facility] bone marrow transplant unit.      Patient then underwent 4 additional cycles of ABVD.  An ABVD without bleomycin     for 2 cycles.  Last chemotherapy was on October .  Patient had residual     solitary right inguinal lymph node which was PET avid with an SUV of 5.6.      Patient was scheduled to receive  consolidative radiation therapy to the right     iliac chain of lymph nodes.  30.6 denisse to the entire area.      -2011 through 2012..  Per patient was diagnosed with Hodgkin's lymphoma.      Patient was treated with      -February  through March .  Patient initially had a right     inguinal extra iliac and internal iliac lymph nodes treated with 30.6 denisse in     17 fractions.      -January .  Patient was seen as an urgent visit for back pain.  No     neurologic deficits.  Patient has normal bowel function and no urinary     incontinence concerning for cauda equina.        -February 3, 2017- has increased itching all over, bilateral leg edema/itching     she has had x 9 months. needs refill for anxiety med until she is able to get     into pain management for pain pump mgmt and pain management. PET ordered but no     date yet.       -February .  PET/CT showed persistent pulmonary nodule in the right     lower lobe.  Follow-up CT of the chest in 6 months recommended.  No lesions     concerning for cancer recurrence.      -June .  CT neck showed several small cervical lymph nodes.  Stable in     size.      -September 1-2017.  CT neck, chest, abdomen, and pelvis showed several non-    pathologically enlarged lymph nodes seen within the chest, abdomen, and pelvis.     The lymph nodes in the abdomen and pelvis appear stable. Hepatosplenomegaly.      -October .  Admitted for right lower extremity cellulitis.      -December 12, 2017. PET CT Skull to Base of mid thigh -  Deauville category 1.     No evidence of adenopathy hypermetabolic activity. Coronary artery and aortic     valvular calcification.       -January .  CT chest showed no evidence of acute intrathoracic     abnormality.  Stable granulomatous inflammatory disease.  CT abdomen and pelvis     showed hepatic steatosis.      -May 2, 2018.  Requested follow up for skin issues that appear to be boils not     cancer  related.  Discussed future follow up with Survivorship clinic , cancer     free for 5 years.  Focus on survivorship and not cancer.        -August 8, 2018. Presents in follow up on her Stage III Hodgkins Lymphoma.  She     is > than 5 years out from treatment.  Has several comorbidities including     Aortic Stenosis, COPD, CVI, Anxiety and Depression, chronic back pain and     immobility.  Is undergoing cardiac cath 8/13/18. Heavy smoker.  Denies fever,     chills, night sweats and lymphadenopathy.            Clinical Staging      Stage III Hodgkins            Clinical Trial Participant      No            ECOG Performance Status      3            PAST, FAMILY   Past Medical History      Past Medical History:  No Diabetes Type 1, No Diabetes Type 2, No Thyroid     Disease, No COPD, No Emphysema, No Hypertension, No Stroke, No High Cholesterol    , No Heart Attack, No Bleeding Condition, No Low or High RBC Count, No Low or     High WBC Count, No Low or High Platelet Coun, No Hepatitis, No Kidney Disease,     No Depression, No Alzheimer's Disease, No Mental Disease, No Seizures, No     Arthritis, No Osteoporosis, No Osteopenia, No Short of Air, No Sleep apnea, No     Liver Disease, No STD, No Enlarged Prostate, No Other      Hematology/oncology:  REPORTS HX OF: Lymphoma (hodgkins), DENIES HX OF:     Previous Treatment for CA, Anemia, Bladder Cancer, Blood cancer, Brain cancer,     Breast cancer, Cervical cancer, Coagulopathy, Colorectal cancer, Endocrine     cancer, Eye cancer, GI cancer,  cancer, Kidney cancer, Leukemia, Leukocytosis    , Leukopenia, Liver cancer, Lung cancer, Musculoskeletal cancer, Myeloma,     Neurologic cancer, Oral cancer, Ovarian cancer, Skin cancer, Stomach cancer,     Thrombocytopenia, Thyroid cancer, Uterine cancer, Other cancer history, Other     hematologic history      Genetic/metabolic:  DENIES HX OF: Cystic fibrosis, Down syndrome, Other genetic     history, Other metabolic history             Past Surgical History      REPORTS HX OF: Cholecystectomy, Biopsy (shoulder, lower back surgery), Other     Past Surgical Hx (tonsilectomy, tumor removed from vaginal area), DENIES HX OF:     Cataract extraction, Thyroid surgery, Lung biopsy, CABG surgery, Coronary stent    , Valve replacement, Appendectomy, Splenectomy, Bladder surgery, Nephrectomy,     Joint replacement, Frature repair, Skin cancer removal, Melanoma excision,     Spinal surgery, Breast biopsy, Lumpectomy, Mastectomy, bilateral, Mastectomy,     right, Mastectomy, left, Hysterectomy, Peg Tube Placement, VAD Placement            Family History      REPORTS HX OF: Kidney Cancer (mother), Other Cancer History (panceratic ca,     GRANDMOTHER, SISTER, AND FATHER), DENIES HX OF: Anemia, Blood disorders, Blood     Cancer, Breast cancer, Cervical cancer, Coagulopathy, Colorectal cancer,     Endocrine Cancer, Eye Cancer, GI Cancer,  Cancer, Leukemia, Leukocytosis,     Leukopenia, Liver Cancer, Lung cancer, Lymphoma, Melanoma, Musculoskeletal     Cancer, Myeloma, Neurologic Cancer, Oral Cancer, Ovarian cancer, Prostate cancer    , Skin Cancer, Stomach Cancer, Testicular Cancer, Thrombocytopenia, Thyroid     cancer, Uterine cancer, Other Hematology History            Social History      Lives independently:  Yes      Occupation:  DISABLED            Tobacco Use      Tobacco status:  Current every day smoker      Smoking packs/day:  1      Smoking history:  25-50 pack years            Alcohol Use      Alcohol intake:  None            Substance Use      Substance use:  Denies use            REVIEW OF SYSTEMS      General:  Complains of: Fatigue, Denies: Appetite change, Weight loss      Eyes:  Denies: Blurred vision, Corrective lenses      Ears, nose, mouth, throat:  Denies: Headache, Seizures, Visual Changes      Cardiovascular:  Complains of: Chest pain, Other (+ murmur), Denies: Irregular     heartbeat, Palpitations      Respiratory:  Complains of:  Shortness of Air, Denies: Productive cough,     Coughing blood      Gastrointestinal:  Denies: Nausea, Vomiting, Constipation, Diarrhea      Kidney/Bladder:  Denies: Painful Urination, Blood in urine      Musculoskeletal:  Complains of: Muscle weakness, Denies: New Back pain      Skin:  DENIES: Easy Bleeding, Nail changes, Rash      Neurological:  Denies: Dizziness, Numbness\Tingling      Psychiatric:  Complains of: Anxiety, AAO X 3, Depression      Endocrine:  DiabetesThyroid Disorder      Hematologic/lymphatic:  Denies: Bruising, Bleeding, Enlarged Lymph Nodes      Reproductive:  Complains of Menopause, Denies Pregnant            VITAL SIGNS,PAIN/FATIGUE SCORE      Vitals      Height 5 ft 10.00 in / 177.8 cm      Weight 174 lbs 13.196 oz / 79.3 kg      BSA 1.99 m2      BMI 25.1 kg/m2      Temperature 98 F / 36.67 C - Temporal      Pulse 88      Respirations 18      Blood Pressure 137/75 Sitting, Left Arm      Pulse Oximetry 94%, RM AIR            Pain Score      Experiencing any pain?:  Yes      Pain Scale Used:  Numerical      Pain Intensity:  8            Fatigue Score      Experiencing any fatigue?:  Yes      Fatigue (0-10 scale):  8            General Appearance:  Alert, Oriented X3, Cooperative, No acute distress      Eyes:  Anicteric Sclerae, Moist Conjunctiva, PERRLA      HEENT:  Orophraynx clear, No Erythema, No Exudates, No Pallor, No Thrush      Neck:  Supple, No Carotid Bruits      Respiratory:  CTAB, Diminished Breath      Abdomen\Gastro:  Soft, No NABS, Hepatosplenomegaly      Cardio:  No RRR, No Murmur (+ 2/6 murmur ), No No, Peripheral Edema (    compression wraps ntoed)      Skin:  Normal Temperature, Normal Texture and Turgor, No Rash, lesions, ulcers      Psychiatric:  Appropriate Affect, Intact Judgement, AAO x3      Neuro:  Cranial Nerve II-XII Inta      Muscularskeletal:  No Normal Gait and Station (wheelchair bound)      Extremities:  Weakness, No Normal Gait and station      Lymphatic:  No  Cervical, No Supraclavicular, No Infraclavicular, No Axillary            PREVENTION      Hx Influenza Vaccination:  No      Influenza Vaccine Declined:  Yes      2 or More Falls Past Year?:  No      Fall Past Year with Injury?:  No      Hx Pneumococcal Vaccination:  No      Encouraged to follow-up with:  PCP regarding preventative exams.      Chart initiated by      KELLY PAGE MA            IMPRESSION/PLAN      Impression      Stage III Hodgkins lymphoma            Plan      1. Discussed with patient is high rate of cure with Hodgkins.  Does have risk     of another cancer due to smoking and recommended complete cessation.      2. Will follow up in 6 months.            Diagnosis      Hodgkin lymphoma       Hodgkin lymphoma, unspecified Hodgkin lymphoma type, unspecified body region       Hodgkin lymphoma type: unspecified type       Lymphoma site: unspecified region            Patient Education      Patient Education Provided:  Yes            Alcohol Counseling      Counseling given:  None            Substance Counseling      Counseling given:  None                 Disclaimer: Converted document may not contain table formatting or lab diagrams. Please see [employer] for the authenticated document.

## 2021-05-28 NOTE — PROGRESS NOTES
Patient: RENÉE ABARCA     Acct: [id number]     Report: #[id number]  UNIT #: [id number]     : 1965    Encounter Date:2018  PRIMARY CARE: Lety Schulz  ***Signed***  --------------------------------------------------------------------------------------------------------------------  Chief Complaint      2018      HODGKINS LYMPHOMA      Intent of Therapy?:  Curative            Current Plan      -Hodgkin's lymphoma      -Patient has had 2 separate episodes of Hodgkin's lymphoma.  She was originally     diagnosed in .  Was treated with ABVD for 3 cycles.  Achieved complete     response.      -Patient relapsed in .  Patient was treated with 6 cycles of ABVD with     radiation treatment of the residual lesion.      -Patient has palpable lymphadenopathy in the left supraclavicular lymph node     and left inguinal lymphadenopathy.      -Patient was seen for urgent care visit for worsening back pain with B     symptoms.  We discussed the need for PET CT since there is a strong concern for     Hodgkin's lymphoma recurrence..      -PET/CT in 2017 did not show evidence of cancer recurrence.      -Currently in remission.  Still has persistent inguinal lymphadenopathy and     neck lymphadenopathy      -No new lymphadenopathy on today's exam.      -No signs and symptoms of recurrence.  No B symptoms            -Right hip pain      -Per patient had trauma to the area      -Pain with tenderness      -Order right hip x-ray            -Right lower extremity cellulitis      -Patient was seen clinic.  Was noted to have erythematous and warm to touch     right thigh with an open pustule.      -Counseled patient to go to an emergency room for consideration of inpatient     antibiotics.      -Improved with antibiotics.  Continue antibiotics prescribed by primary care     physician      -Resolved            -Decreased appetite      -Orders placed for Marinol 5 mg            -Interval Clinic  Visit      -Refill on Xanax.       -RTC in one month due to worsening appetite and persistent lymphadenopathy      -Continue observation.            Medical Evaluation of Acute and Chronic Medical Conditions today      -Pulmonary nodule      -Right lower lobe pulmonary nodule is noted.        -Orders placed for follow-up CT in 6 months.      -Stable..       -Heart Murmur      -Patient will be followed by cardiology.      -No chest pain or shortness of breath today.      -Back pain      -Acute visit for worsening back pain.      -Patient has no incontinence or bowel problems concerning for Cauda Equina.        -Patient is on a Dilaudid pain pump managed by [facility]     group.       -Patient was given a short acting hydrocodone acetaminophen for breakthrough     pain.      -Continue follow-up with pain medicine clinic.        -Anxiety      -Well controlled currently.      -Continue current medications-      -Refill Xanax, 0.5 mg BID, # 60 tabs, no refills.            Medical Evaluation of Cancer Associated Symptoms today      -Cancer associated pain      -Patient's pain has remained stable.      -Continue current pain regimen.      -Cancer associated muscle weakness      -Patient's weakness has remained stable.      -Continue close observation.      -Cancer associated fatigue      -Patient's fatigue has remained stable.      -Recommended exercise.      -Anxiety due to cancer      -Patient's anxiety is well controlled today.       -Continue current management.       -Today's Evaluation      -Patient's imaging exams, blood tests, physicians' notes, and any new findings     since our last visit were reviewed today to reassess patient's medical     treatment plan. .       -Old medical records were reviewed and summarized in chronological order in the     HPI today to maintain an updated medical record.       -Patient's radiology imaging tests from our last visit were reviewed     independently by me by  direct visualization of the images.        -Patients current lab tests and medications were carefully reviewed to evaluate     patient's current treatment plan today.       -Patient was advised to call us right away if there are any new symptoms for an     urgent visit for further evaluation. Patient voiced understanding and agreed to     do so.      Hodgkin disease - C81.90            Notes      New Medications      * NYSTATIN (Nystatin*) 15 GM POWDER: 1 APL TOPICAL QID 14 Days #1       Instructions: Use to right groin as needed for itching/ burning       New Diagnostics      * CMP Comp Metabolic Panel, Stat       Dx: Lymphoma - C85.90      * LDH, Stat       Dx: Lymphoma - C85.90      * CBC, As Soon As Possible       Dx: Lymphoma - C85.90      * Sed Rate, As Soon As Possible       Dx: Lymphoma - C85.90      * HIP - Right, Routine       Dx: Right hip pain - M25.551      Time Spent:  > 50% /Coord Care      Patient Education Provided:  Yes            ECOG      ECOG Performance Status:  3            History and Present Illness      This is a very pleasant 51-year-old female who presents to oncology clinic for     new symptoms of drenching night sweats, fever, and 15 pound weight loss in the     last 6 months.            -August .  Patient was diagnosed with nodular sclerosing Hodgkin's     lymphoma.  This was from a suspicious right groin mass which was resected by     Dr. Peña.  She had clinical stage III involvement of the mediastinal, right     groin, left axilla, and mild splenomegaly.  Patient received 3 cycles of ABVD.      She was recommended 6 cycles but could not tolerate treatment.  On follow-up     study she did have a complete response.      -February 2011 to October .  Patient presented with right groin     lymphadenopathy with B symptoms of drenching night sweats, fever, and 10 pound     weight loss.  Biopsy yielded a diagnosis of recurrent Hodgkin's disease.      Patient was seen at  the [facility] bone marrow transplant unit.      Patient then underwent 4 additional cycles of ABVD.  An ABVD without bleomycin     for 2 cycles.  Last chemotherapy was on October .  Patient had residual     solitary right inguinal lymph node which was PET avid with an SUV of 5.6.      Patient was scheduled to receive consolidative radiation therapy to the right     iliac chain of lymph nodes.  30.6 denisse to the entire area.      -2011 through 2012..  Per patient was diagnosed with Hodgkin's lymphoma.      Patient was treated with      -February  through March .  Patient initially had a right     inguinal extra iliac and internal iliac lymph nodes treated with 30.6 denisse in     17 fractions.      -January .  Patient was seen as an urgent visit for back pain.  No     neurologic deficits.  Patient has normal bowel function and no urinary     incontinence concerning for cauda equina.        -February 3, 2017- has increased itching all over, bilateral leg edema/itching     she has had x 9 months. needs refill for anxiety med until she is able to get     into pain management for pain pump mgmt and pain management. PET ordered but no     date yet.       -February .  PET/CT showed persistent pulmonary nodule in the right     lower lobe.  Follow-up CT of the chest in 6 months recommended.  No lesions     concerning for cancer recurrence.      -June .  CT neck showed several small cervical lymph nodes.  Stable in     size.      -September 1-2017.  CT neck, chest, abdomen, and pelvis showed several non-    pathologically enlarged lymph nodes seen within the chest, abdomen, and pelvis.     The lymph nodes in the abdomen and pelvis appear stable. Hepatosplenomegaly.      -October .  Admitted for right lower extremity cellulitis.      -December 12, 2017. PET CT Skull to Base of mid thigh -  Deauville category 1.     No evidence of adenopathy hypermetabolic activity.  "Coronary artery and aortic     valvular calcification.       -January .  CT chest showed no evidence of acute intrathoracic     abnormality.  Stable granulomatous inflammatory disease.  CT abdomen and pelvis     showed hepatic steatosis.            Vitals      Height 5 ft 10.00 in / 177.8 cm      Weight 171 lbs 1.231 oz / 77.6 kg      BSA 1.96 m2      BMI 24.5 kg/m2      Temperature 97.5 F / 36.39 C - Temporal      Pulse 80      Blood Pressure 112/71 Sitting, Left Arm      Pulse Oximetry 100%, ROOM AIR            Allergies      Coded Allergies:             AMOXICILLIN (Verified  Allergy, Severe, ANAPHALAX, 4/5/18)           PENICILLINS (Verified  Allergy, Severe, TROUBLE BREATHING, 4/5/18)           SULFA (SULFONAMIDE ANTIBIOTICS) (Verified  Allergy, Severe, TIGHTNESS IN     THROAT, TROUBLE BREATHING,        CEFAZOLIN (Verified  Allergy, Unknown, SWELLING IN LIPS, 4/5/18)           CEPHALEXIN (Verified  Allergy, Unknown, SWELLING IN LIPS, 4/5/18)           CEPHALOSPORINS (Verified  Allergy, Unknown, SWELLING IN LIPS, 4/5/18)           CLARITHROMYCIN (Verified  Allergy, Unknown, UNKNOWN, 4/5/18)           DIPHENHYDRAMINE HCL (Verified  Allergy, Unknown, UNK, 4/5/18)           DOXYCYCLINE (Verified  Allergy, Unknown, \"pt reports red bumps on my eyes     and makes them swell\", 4/5/18)           IODINE AND IODIDE CONTAINING PRODUC (Verified  Allergy, Unknown, HIVES,     ITCHING, 4/5/18)           PROCHLORPERAZINE (Verified  Allergy, Unknown, HIVES, ITCHING, 4/5/18)           PROMETHAZINE (Verified  Allergy, Unknown, HIVES, ITCHING, 4/5/18)           ASPIRIN (Verified  Adverse Reaction, Unknown, \"INTERACTS WITH COUMADIN\", 4/ 5/18)           EGG (Verified  Adverse Reaction, Unknown, VOMITING, 4/5/18)           IODINATED CONTRAST- ORAL AND IV DYE (Verified  Adverse Reaction, Unknown,     SEVERE PAIN IN EXTREMITY IN WHICH MEDICATION WAS GIVEN , 4/5/18)           LORAZEPAM (Verified  Adverse Reaction, Unknown, " SEVERE DREAMS, 4/5/18)           NSAIDS (NON-STEROIDAL ANTI-INFLAMMA (Verified  Adverse Reaction, Unknown,     LEG CRAMPS, 4/5/18)           ZOLPIDEM TARTRATE (Verified  Adverse Reaction, Unknown, MAKES VERY     EMOTIONAL, 4/5/18)            Medications      Last Reconciled on 4/6/18 19:21 by KORINA SHEPARD MD      Nystatin (Nystatin*) 15 Gm Powder      1 APL TOPICAL QID for 14 Days, #1 GM         Prov: GINNY JOHN onc         4/5/18       ALPRAZolam (Xanax) 1 Mg Tablet      1 MG PO BID, TAB         Reported         3/7/18       Acetaminophen* (Tylenol*) 325 Mg Tablet      650 MG PO Q4H Y for PAIN OR FEVER, #100 TAB 0 Refills         Reported         12/28/17       Hydromorphone HCl (Dilaudid) Unknown Strength Ampul      CONTINUOUS, AMP         Reported         10/12/17            General Information      Primary Provider:  WENDIE NOYOLA            Chemo Status      On Oral Chemotherapy?:  No            Other      Clinical Trial Participant?:  No            Past Medical History      No Diabetes Type 1, No Diabetes Type 2, No Thyroid Disease, No COPD, No     Emphysema, No Hypertension, No Stroke, No High Cholesterol, No Heart Attack, No     Bleeding Condition, No Low or High RBC Count, No Low or High WBC Count, No Low     or High Platelet Coun, No Hepatitis, No Kidney Disease, No Depression, No     Alzheimer's Disease, No Mental Disease, No Seizures, No Arthritis, No     Osteoporosis, No Osteopenia, No Short of Air, No Sleep apnea, No Liver Disease,     No STD, No Enlarged Prostate, No Other      Hematology/oncology:  REPORTS HX OF: Lymphoma (hodgkins), DENIES HX OF:     Previous Treatment for CA, Anemia, Bladder Cancer, Blood cancer, Brain cancer,     Breast cancer, Cervical cancer, Coagulopathy, Colorectal cancer, Endocrine     cancer, Eye cancer, GI cancer,  cancer, Kidney cancer, Leukemia, Leukocytosis    , Leukopenia, Liver cancer, Lung cancer, Musculoskeletal cancer, Myeloma,     Neurologic cancer,  Oral cancer, Ovarian cancer, Skin cancer, Stomach cancer,     Thrombocytopenia, Thyroid cancer, Uterine cancer, Other cancer history, Other     hematologic history      Genetic/metabolic:  DENIES HX OF: Cystic fibrosis, Down syndrome, Other genetic     history, Other metabolic history            Past Surgical History      REPORTS HX OF: Cholecystectomy, Biopsy (shoulder, lower back surgery), Other     Past Surgical Hx (tonsilectomy, tumor removed from vaginal area), DENIES HX OF:     Cataract extraction, Thyroid surgery, Lung biopsy, CABG surgery, Coronary stent    , Valve replacement, Appendectomy, Splenectomy, Bladder surgery, Nephrectomy,     Joint replacement, Frature repair, Skin cancer removal, Melanoma excision,     Spinal surgery, Breast biopsy, Lumpectomy, Mastectomy, bilateral, Mastectomy,     right, Mastectomy, left, Hysterectomy, Peg Tube Placement, VAD Placement            Family History      REPORTS HX OF: Kidney Cancer (mother), Other Cancer History (panceratic ca,     GRANDMOTHER, SISTER, AND FATHER), DENIES HX OF: Anemia, Blood disorders, Blood     Cancer, Breast cancer, Cervical cancer, Coagulopathy, Colorectal cancer,     Endocrine Cancer, Eye Cancer, GI Cancer,  Cancer, Leukemia, Leukocytosis,     Leukopenia, Liver Cancer, Lung cancer, Lymphoma, Melanoma, Musculoskeletal     Cancer, Myeloma, Neurologic Cancer, Oral Cancer, Ovarian cancer, Prostate cancer    , Skin Cancer, Stomach Cancer, Testicular Cancer, Thrombocytopenia, Thyroid     cancer, Uterine cancer, Other Hematology History            Social History      Yes      DISABLED            Tobacco Use      Tobacco status:  Current every day smoker      Smoking packs/day:  1      Smoking history:  25-50 pack years            Alcohol Use      Alcohol intake:  None            Substance Use      Substance use:  Denies use            ROS      General:  Complains of: Appetite change, Denies: Excessive sweating, Fatigue,     Fever, Night sweats,  Weight gain, Weight loss, Other      Eyes:  Denies: Blurred vision, Corrective lenses, Diplopia, Eye irritation, Eye     pain, Eye redness, Spots in vision, Vision loss, Other      Ears, nose, mouth, throat:  Denies: Headache, Seizures, Visual Changes, Hearing     loss, Sinus Congestion, Hoarseness, Sore throat, Other      Cardiovascular:  Denies: Chest pain, Irregular heartbeat, Palpitations, Swollen     ankles/legs, Other      Respiratory:  Denies: Chest pain, Shortness of Air, Productive cough, Coughing     blood, Other      Gastrointestinal:  Denies: Nausea, Vomiting, Problem swallowing, Frequent     heartburn, Constipation, Diarrhea, Tarry stools, Bloody stools, Unable to     control bowels, Other      Kidney/Bladder:  Denies: Painful Urination, Change in urinary stream, Blood in     urine, Incontinence, Frequent Urination, Decreased urine stream, Other      Musculoskeletal:  Denies: New Back pain, Leg Cramps, Painful Joints, Swollen     Joints, Muscle Pain, Muscle weakness, Other      Skin:  DENIES: Jaundice, Easy Bleeding, Lesions/changes in moles, Nail changes,     Skin Discoloration, Rash, Other      Neurological:  Denies: Dizziness, Fainting, Numbness\Tingling, Paralysis,     Seizures, Other      Psychiatric:  Complains of: AAO X 3, Denies: Anxiety, Panic attacks, Depression    , Memory loss, Other      Endocrine:  DiabetesThyroid DisorderOsteoporosisEndocrine Other      Hematologic/lymphatic:  Denies: Bruising, Bleeding, Enlarged Lymph Nodes,     Recurrent infections, Other      Reproductive:  Denies Pregnant, Denies Menopause, Denies Still Menstruating,     Denies Heavy Periods, Denies Other            Vitals            Vital Signs              Date Time  Temp Pulse Resp B/P (MAP) Pulse Ox O2 Delivery O2 Flow Rate FiO2             3/7/18 11:22 98.1 81  117/71 97 ROOM AIR              General Appearance:  Alert, Oriented X3, Cooperative, No acute distress      Eyes:  Anicteric Sclerae, Moist Conjunctiva,  PERRLA      HEENT:  Orophraynx clear, No Erythema, No Exudates      Neck:  Supple, Full ROM, No Masses or JVD      Respiratory:  CTAB, Diminished Breath, No Rales, No Crackels, No Stridor, No     Rhonchi      Breast\Chest:  Symmetrical, No Nipple Discharge, No Masses      Abdomen\Gastro:  Soft, No NABS, No Masses, No Hernias, No Hepatosplenomegaly      Cardio:  No RRR, No Murmur, No, Peripheral Edema, Normal PMI, Other (heart     murmur present. )      Skin:  Normal Temperature, Normal Tone, Normal Texture and Turgor, No Rash,     lesions, ulcers      Psychiatric:  Appropriate Affect, Intact Judgement, AAO x3      Neuro:  Cranial Nerve II-XII Inta, No Focal Sensory Deficit      Genitourinary:  No Keating Catheter, No Bladder Distention      Muscularskeletal:  Normal Gait and Station, Full ROM of extremeties, Full     muscle strength\tone      Extremities:  No Digital Cyanosis, No Digital Ischemia, Pedal Pulses Intact,     Pedal Pulses Symetrical, Weakness, Other (right lower extremity erythema and     pain.)      Lymphatic:  No Cervical, No Supraclavicular, No Infraclavicular, No Axillary,     No Inguinal            Lab Results      Laboratory Tests      3/7/18 12:16            Hx Influenza Vaccination:  No      Influenza Vaccine Declined:  Yes      2 or More Falls Past Year?:  No      Fall Past Year with Injury?:  No      Hx Pneumococcal Vaccination:  No      Encouraged to follow-up with:  PCP regarding preventative exams.      Chart initiated by      KEYANNA NEELY CMA                 Disclaimer: Converted document may not contain table formatting or lab diagrams. Please see [employer] for the authenticated document.

## 2021-05-28 NOTE — PROGRESS NOTES
Patient: RENÉE ABARCA     Acct: [id number]     Report: #[id number]  UNIT #: [id number]     : 1965    Encounter Date:2018  PRIMARY CARE: Lety Schulz  ***Signed***  --------------------------------------------------------------------------------------------------------------------  Chief Complaint      2018      HODGKINS LYMPHOMA            Current Plan      -Hodgkin's lymphoma      -Patient has had 2 separate episodes of Hodgkin's lymphoma.  She was originally     diagnosed in .  Was treated with ABVD for 3 cycles.  Achieved complete     response.      -Patient relapsed in .  Patient was treated with 6 cycles of ABVD with     radiation treatment of the residual lesion.      -Patient has palpable lymphadenopathy in the left supraclavicular lymph node     and left inguinal lymphadenopathy.      -Patient was seen for urgent care visit for worsening back pain with B     symptoms.  We discussed the need for PET CT since there is a strong concern for     Hodgkin's lymphoma recurrence..      -PET/CT in 2017 did not show evidence of cancer recurrence.      -Currently in remission.  Still has persistent inguinal lymphadenopathy and     neck lymphadenopathy      -No new lymphadenopathy on today's exam.      -Patient was notified to call us if she has any new swelling or lymph nodes.      Also shortness of breath.            -Right lower extremity cellulitis      -Patient was seen clinic.  Was noted to have erythematous and warm to touch     right thigh with an open pustule.      -Counseled patient to go to an emergency room for consideration of inpatient     antibiotics.      -Improved with antibiotics.  Continue antibiotics prescribed by primary care     physician            Interval Clinic Visit      -Refill on Xanax.       -RTC in one month      -Continue observation.            Medical Evaluation of Acute and Chronic Medical Conditions today      -Pulmonary nodule       -Right lower lobe pulmonary nodule is noted.        -Orders placed for follow-up CT in 6 months.      -F/U CT Chest is ordered.       -Heart Murmur      -Patient will be followed by cardiology.      -No chest pain or shortness of breath today.      -Back pain      -Acute visit for worsening back pain.      -Patient has no incontinence or bowel problems concerning for Cauda Equina.        -Patient is on a Dilaudid pain pump managed by [facility]     group.       -Patient was given a short acting hydrocodone acetaminophen for breakthrough     pain.      -Continue follow-up with pain medicine clinic.        -Anxiety      -Well controlled currently.      -Continue current medications-      -Refill Xanax, 0.5 mg BID, # 60 tabs, no refills.            Medical Evaluation of Cancer Associated Symptoms today      -Cancer associated pain      -Patient's pain has remained stable.      -Continue current pain regimen.      -Cancer associated muscle weakness      -Patient's weakness has remained stable.      -Continue close observation.      -Cancer associated fatigue      -Patient's fatigue has remained stable.      -Recommended exercise.      -Anxiety due to cancer      -Patient's anxiety is well controlled today.       -Continue current management.       -Today's Evaluation      -Patient's imaging exams, blood tests, physicians' notes, and any new findings     since our last visit were reviewed today to reassess patient's medical     treatment plan. .       -Old medical records were reviewed and summarized in chronological order in the     HPI today to maintain an updated medical record.       -Patient's radiology imaging tests from our last visit were reviewed     independently by me by direct visualization of the images.        -Patients current lab tests and medications were carefully reviewed to evaluate     patient's current treatment plan today.       -Patient was advised to call us right away if  there are any new symptoms for an     urgent visit for further evaluation. Patient voiced understanding and agreed to     do so.      Time Spent:  > 50% /Coord Care      Patient Education Provided:  Yes            ECOG      ECOG Performance Status:  3            History and Present Illness      -August .  Patient was diagnosed with nodular sclerosing Hodgkin's     lymphoma.  This was from a suspicious right groin mass which was resected by     Dr. Peña.  She had clinical stage III involvement of the mediastinal, right     groin, left axilla, and mild splenomegaly.  Patient received 3 cycles of ABVD.      She was recommended 6 cycles but could not tolerate treatment.  On follow-up     study she did have a complete response.      -February 2011 to October .  Patient presented with right groin     lymphadenopathy with B symptoms of drenching night sweats, fever, and 10 pound     weight loss.  Biopsy yielded a diagnosis of recurrent Hodgkin's disease.      Patient was seen at the [facility] bone marrow transplant unit.      Patient then underwent 4 additional cycles of ABVD.  An ABVD without bleomycin     for 2 cycles.  Last chemotherapy was on October .  Patient had residual     solitary right inguinal lymph node which was PET avid with an SUV of 5.6.      Patient was scheduled to receive consolidative radiation therapy to the right     iliac chain of lymph nodes.  30.6 denisse to the entire area.      -2011 through 2012..  Per patient was diagnosed with Hodgkin's lymphoma.      Patient was treated with      -February  through March .  Patient initially had a right     inguinal extra iliac and internal iliac lymph nodes treated with 30.6 denisse in     17 fractions.      -January .  Patient was seen as an urgent visit for back pain.  No     neurologic deficits.  Patient has normal bowel function and no urinary     incontinence concerning for cauda equina.         -February 3, 2017- has increased itching all over, bilateral leg edema/itching     she has had x 9 months. needs refill for anxiety med until she is able to get     into pain management for pain pump mgmt and pain management. PET ordered but no     date yet.       -February .  PET/CT showed persistent pulmonary nodule in the right     lower lobe.  Follow-up CT of the chest in 6 months recommended.  No lesions     concerning for cancer recurrence.      -June .  CT neck showed several small cervical lymph nodes.  Stable in     size.      -September 1-2017.  CT neck, chest, abdomen, and pelvis showed several non-    pathologically enlarged lymph nodes seen within the chest, abdomen, and pelvis.     The lymph nodes in the abdomen and pelvis appear stable. Hepatosplenomegaly.      -October .  Admitted for right lower extremity cellulitis.      -December 12, 2017. PET CT Skull to Base of mid thigh -  Deauville category 1.     No evidence of adenopathy hypermetabolic activity. Coronary artery and aortic     valvular calcification.             This is a very pleasant 51-year-old female who presents to oncology clinic for     new symptoms of drenching night sweats, fever, and 15 pound weight loss in the     last 6 months.            Vitals      Height 5 ft 10.00 in / 177.8 cm      Weight 184 lbs 11.928 oz / 83.8 kg      BSA 2.05 m2      BMI 26.5 kg/m2      Temperature 97.6 F / 36.44 C - Temporal      Pulse 100      Blood Pressure 102/62 Sitting, Left Arm      Pulse Oximetry 93%, ROOM AIR            Allergies      Coded Allergies:             AMOXICILLIN (Verified  Allergy, Severe, ANAPHALAX, 2/8/18)           PENICILLINS (Verified  Allergy, Severe, TROUBLE BREATHING, 2/8/18)           SULFA (SULFONAMIDE ANTIBIOTICS) (Verified  Allergy, Severe, TIGHTNESS IN     THROAT, TROUBLE BREATHING,        CEFAZOLIN (Verified  Allergy, Unknown, SWELLING IN LIPS, 2/8/18)           CEPHALEXIN (Verified  Allergy,  "Unknown, SWELLING IN LIPS, 2/8/18)           CEPHALOSPORINS (Verified  Allergy, Unknown, SWELLING IN LIPS, 2/8/18)           CLARITHROMYCIN (Verified  Allergy, Unknown, UNKNOWN, 2/8/18)           DIPHENHYDRAMINE HCL (Verified  Allergy, Unknown, UNK, 2/8/18)           DOXYCYCLINE (Verified  Allergy, Unknown, \"pt reports red bumps on my eyes     and makes them swell\", 2/8/18)           IODINE AND IODIDE CONTAINING PRODUC (Verified  Allergy, Unknown, HIVES,     ITCHING, 2/8/18)           PROCHLORPERAZINE (Verified  Allergy, Unknown, HIVES, ITCHING, 2/8/18)           PROMETHAZINE (Verified  Allergy, Unknown, HIVES, ITCHING, 2/8/18)           ASPIRIN (Verified  Adverse Reaction, Unknown, \"INTERACTS WITH COUMADIN\", 2/ 8/18)           EGG (Verified  Adverse Reaction, Unknown, VOMITING, 2/8/18)           IODINATED CONTRAST- ORAL AND IV DYE (Verified  Adverse Reaction, Unknown,     SEVERE PAIN IN EXTREMITY IN WHICH MEDICATION WAS GIVEN , 2/8/18)           LORAZEPAM (Verified  Adverse Reaction, Unknown, SEVERE DREAMS, 2/8/18)           NSAIDS (NON-STEROIDAL ANTI-INFLAMMA (Verified  Adverse Reaction, Unknown,     LEG CRAMPS, 2/8/18)           ZOLPIDEM TARTRATE (Verified  Adverse Reaction, Unknown, MAKES VERY     EMOTIONAL, 2/8/18)            Medications      Last Reconciled on 2/8/18 23:56 by KORINA SHEPARD MD      Hydrocodone/Acetaminophen 10/325 MG (Hydrocodone/Acetaminophen 10/325 MG) 1 Tab     Tablet      1 TAB PO Q12H Y for BREAKTHROUGH PAIN, TAB 0 Refills         Reported         1/19/18       Alprazolam (Alprazolam) 1 Mg Tablet      1 MG PO BID, #60 TAB         Reported         1/19/18       Acetaminophen* (Tylenol*) 325 Mg Tablet      650 MG PO Q4H Y for PAIN OR FEVER, #100 TAB 0 Refills         Reported         12/28/17       Hydromorphone HCl (Dilaudid) Unknown Strength Ampul      CONTINUOUS, AMP         Reported         10/12/17            General Information      Primary Provider:  WENDIE NOYOLA          "   Chemo Status      On Oral Chemotherapy?:  No            Other      Clinical Trial Participant?:  No            Past Medical History      No Diabetes Type 1, No Diabetes Type 2, No Thyroid Disease, No COPD, No     Emphysema, No Hypertension, No Stroke, No High Cholesterol, No Heart Attack, No     Bleeding Condition, No Low or High RBC Count, No Low or High WBC Count, No Low     or High Platelet Coun, No Hepatitis, No Kidney Disease, No Depression, No     Alzheimer's Disease, No Mental Disease, No Seizures, No Arthritis, No     Osteoporosis, No Osteopenia, No Short of Air, No Sleep apnea, No Liver Disease,     No STD, No Enlarged Prostate, No Other      Hematology/oncology:  REPORTS HX OF: Lymphoma (hodgkins), DENIES HX OF:     Previous Treatment for CA, Anemia, Bladder Cancer, Blood cancer, Brain cancer,     Breast cancer, Cervical cancer, Coagulopathy, Colorectal cancer, Endocrine     cancer, Eye cancer, GI cancer,  cancer, Kidney cancer, Leukemia, Leukocytosis    , Leukopenia, Liver cancer, Lung cancer, Musculoskeletal cancer, Myeloma,     Neurologic cancer, Oral cancer, Ovarian cancer, Skin cancer, Stomach cancer,     Thrombocytopenia, Thyroid cancer, Uterine cancer, Other cancer history, Other     hematologic history      Genetic/metabolic:  DENIES HX OF: Cystic fibrosis, Down syndrome, Other genetic     history, Other metabolic history            Past Surgical History      REPORTS HX OF: Cholecystectomy, Biopsy (shoulder, lower back surgery), Other     Past Surgical Hx (tonsilectomy, tumor removed from vaginal area), DENIES HX OF:     Cataract extraction, Thyroid surgery, Lung biopsy, CABG surgery, Coronary stent    , Valve replacement, Appendectomy, Splenectomy, Bladder surgery, Nephrectomy,     Joint replacement, Frature repair, Skin cancer removal, Melanoma excision,     Spinal surgery, Breast biopsy, Lumpectomy, Mastectomy, bilateral, Mastectomy,     right, Mastectomy, left, Hysterectomy, Peg Tube  Placement, VAD Placement            Family History      REPORTS HX OF: Kidney Cancer (mother), Other Cancer History (panceratic ca,     GRANDMOTHER, SISTER, AND FATHER), DENIES HX OF: Anemia, Blood disorders, Blood     Cancer, Breast cancer, Cervical cancer, Coagulopathy, Colorectal cancer,     Endocrine Cancer, Eye Cancer, GI Cancer,  Cancer, Leukemia, Leukocytosis,     Leukopenia, Liver Cancer, Lung cancer, Lymphoma, Melanoma, Musculoskeletal     Cancer, Myeloma, Neurologic Cancer, Oral Cancer, Ovarian cancer, Prostate cancer    , Skin Cancer, Stomach Cancer, Testicular Cancer, Thrombocytopenia, Thyroid     cancer, Uterine cancer, Other Hematology History            Social History      Yes      DISABLED            Tobacco Use      Tobacco status:  Current every day smoker      Smoking packs/day:  1      Smoking history:  25-50 pack years            Alcohol Use      Alcohol intake:  None            Substance Use      Substance use:  Denies use            ROS      General:  Denies: Appetite change, Excessive sweating, Fatigue, Fever, Night     sweats, Weight gain, Weight loss, Other      Eyes:  Denies: Blurred vision, Corrective lenses, Diplopia, Eye irritation, Eye     pain, Eye redness, Spots in vision, Vision loss, Other      Ears, nose, mouth, throat:  Denies: Headache, Seizures, Visual Changes, Hearing     loss, Sinus Congestion, Hoarseness, Sore throat, Other      Cardiovascular:  Denies: Chest pain, Irregular heartbeat, Palpitations, Swollen     ankles/legs, Other      Respiratory:  Denies: Chest pain, Shortness of Air, Productive cough, Coughing     blood, Other      Gastrointestinal:  Denies: Nausea, Vomiting, Problem swallowing, Frequent     heartburn, Constipation, Diarrhea, Tarry stools, Bloody stools, Unable to     control bowels, Other      Kidney/Bladder:  Denies: Painful Urination, Change in urinary stream, Blood in     urine, Incontinence, Frequent Urination, Decreased urine stream, Other       Musculoskeletal:  Denies: New Back pain, Leg Cramps, Painful Joints, Swollen     Joints, Muscle Pain, Muscle weakness, Other      Skin:  DENIES: Jaundice, Easy Bleeding, Lesions/changes in moles, Nail changes,     Skin Discoloration, Rash, Other      Neurological:  Denies: Dizziness, Fainting, Numbness\Tingling, Paralysis,     Seizures, Other      Psychiatric:  Complains of: AAO X 3, Denies: Anxiety, Panic attacks, Depression    , Memory loss, Other      Endocrine:  DiabetesThyroid DisorderOsteoporosisEndocrine Other      Hematologic/lymphatic:  Denies: Bruising, Bleeding, Enlarged Lymph Nodes,     Recurrent infections, Other      Reproductive:  Denies Pregnant, Denies Menopause, Denies Still Menstruating,     Denies Heavy Periods, Denies Other            Vitals            Vital Signs              Date Time  Temp Pulse Resp B/P (MAP) Pulse Ox O2 Delivery O2 Flow Rate FiO2             1/9/18 10:40 97.8 87  117/69 97 rm air              General Appearance:  Alert, Oriented X3, Cooperative, No acute distress      Eyes:  Anicteric Sclerae, Moist Conjunctiva, PERRLA      HEENT:  Orophraynx clear, No Erythema, No Exudates      Neck:  Supple, Full ROM, No Masses or JVD      Respiratory:  CTAB, Diminished Breath, No Rales, No Crackels, No Stridor, No     Rhonchi      Breast\Chest:  Symmetrical, No Nipple Discharge, No Masses      Abdomen\Gastro:  Soft, No NABS, No Masses, No Hernias, No Hepatosplenomegaly      Cardio:  No RRR, No Murmur, No, Peripheral Edema, Normal PMI, Other (heart     murmur present. )      Skin:  Normal Temperature, Normal Tone, Normal Texture and Turgor, No Rash,     lesions, ulcers      Psychiatric:  Appropriate Affect, Intact Judgement, AAO x3      Neuro:  Cranial Nerve II-XII Inta, No Focal Sensory Deficit      Genitourinary:  No Keating Catheter, No Bladder Distention      Muscularskeletal:  Normal Gait and Station, Full ROM of extremeties, Full     muscle strength\tone      Extremities:  No  Digital Cyanosis, No Digital Ischemia, Pedal Pulses Intact,     Pedal Pulses Symetrical, Weakness, Other (right lower extremity erythema and     pain.)      Lymphatic:  No Cervical, No Supraclavicular, No Infraclavicular, No Axillary,     No Inguinal            Lab Results      Laboratory Tests      1/22/18 06:23            Laboratory Tests            Test        1/9/18      12:05             Ferritin        91 ng/ml      ()            Hx Influenza Vaccination:  No      Influenza Vaccine Declined:  Yes      2 or More Falls Past Year?:  No      Fall Past Year with Injury?:  No      Hx Pneumococcal Vaccination:  No      Encouraged to follow-up with:  PCP regarding preventative exams.      Chart initiated by      KEYANNA NEELY CMA                 Disclaimer: Converted document may not contain table formatting or lab diagrams. Please see [employer] for the authenticated document.

## 2021-05-28 NOTE — PROGRESS NOTES
Patient: RENÉE ABARCA     Acct: [id number]     Report: #[id number]  UNIT #: [id number]     : 1965    Encounter Date:2019  PRIMARY CARE: Lety Schulz  ***Signed***  --------------------------------------------------------------------------------------------------------------------  NURSE INTAKE      Visit Type      Established Patient Visit            Chief Complaint      HODGKINS LYMPHOMA            Referring Provider/Copies To      Primary Care Provider:  Lety Schulz            History and Present Illness      Past Oncology Illness History      This is a very pleasant 52-year-old female who presents to oncology clinic for     new symptoms of drenching night sweats, fever, and 15 pound weight loss in the     last 6 months.            -.  Patient was diagnosed with nodular sclerosing Hodgkin's     lymphoma.  This was from a suspicious right groin mass which was resected by Dr. Peña.  She had clinical stage III involvement of the mediastinal, right groin,     left axilla, and mild splenomegaly.  Patient received 3 cycles of ABVD.  She was    recommended 6 cycles but could not tolerate treatment.  On follow-up study she     did have a complete response.      -2011 to .  Patient presented with right groin     lymphadenopathy with B symptoms of drenching night sweats, fever, and 10 pound     weight loss.  Biopsy yielded a diagnosis of recurrent Hodgkin's disease.  Jono luu was seen at the [facility] bone marrow transplant unit.      Patient then underwent 4 additional cycles of ABVD.  An ABVD without bleomycin     for 2 cycles.  Last chemotherapy was on .  Patient had residual     solitary right inguinal lymph node which was PET avid with an SUV of 5.6.      Patient was scheduled to receive consolidative radiation therapy to the right     iliac chain of lymph nodes.  30.6 denisse to the entire area.      - through  2012..  Per patient was diagnosed with Hodgkin's lymphoma.      Patient was treated with      -February  through March .  Patient initially had a right inguinal    extra iliac and internal iliac lymph nodes treated with 30.6 denisse in 17     fractions.      -January .  Patient was seen as an urgent visit for back pain.  No     neurologic deficits.  Patient has normal bowel function and no urinary     incontinence concerning for cauda equina.        -February 3, 2017- has increased itching all over, bilateral leg edema/itching     she has had x 9 months. needs refill for anxiety med until she is able to get     into pain management for pain pump mgmt and pain management. PET ordered but no     date yet.       -February .  PET/CT showed persistent pulmonary nodule in the right lower    lobe.  Follow-up CT of the chest in 6 months recommended.  No lesions concerning    for cancer recurrence.      -June .  CT neck showed several small cervical lymph nodes.  Stable in     size.      -September 1-2017.  CT neck, chest, abdomen, and pelvis showed several non-    pathologically enlarged lymph nodes seen within the chest, abdomen, and pelvis.     The lymph nodes in the abdomen and pelvis appear stable. Hepatosplenomegaly.      -October .  Admitted for right lower extremity cellulitis.      -December 12, 2017. PET CT Skull to Base of mid thigh -  Deauville category 1.     No evidence of adenopathy hypermetabolic activity. Coronary artery and aortic     valvular calcification.       -January .  CT chest showed no evidence of acute intrathoracic     abnormality.  Stable granulomatous inflammatory disease.  CT abdomen and pelvis     showed hepatic steatosis.      -May 2, 2018.  Requested follow up for skin issues that appear to be boils not     cancer related.  Discussed future follow up with Survivorship clinic , cancer     free for 5 years.  Focus on survivorship and not cancer.         -August 8, 2018. Presents in follow up on her Stage III Hodgkins Lymphoma.  She     is > than 5 years out from treatment.  Has several comorbidities including     Aortic Stenosis, COPD, CVI, Anxiety and Depression, chronic back pain and     immobility.  Is undergoing cardiac cath 8/13/18. Heavy smoker.  Denies fever,     chills, night sweats and lymphadenopathy.        -December 14,2018. WBC 5.44, Hemoglobin 13.20, Platelet count 153,000.            HPI - Oncology Interim      Presents for follow up for Hodgkin's lymphoma. She is status post 5 years cancer    free.             Patient return in December with complaints of left breast nipple discharge with     brownish discharge. A breast US and mammogram were completed and there were no     mammographic or sonographic results correlating to patient's known left nipple     discharge. Radiologists does recommend surgical consultation for the left breast    discharge. Referral made to Dr. Peña for evaluation.             At today's visit, she verbalizes she will still have expresses left breast     discharge and occasionally the breast has discharge on its own.             Most recent scan in January shows stable lymphadenopathy. Reviewed scans and     labs with patient.            Patient is requesting refill on anti-anxiety medication today with refill on the    Xanax and would like dosing to be increased.            Clinical Staging      Stage III Hodgkins            Clinical Trial Participant      No            ECOG Performance Status      3            Most Recent Lab Findings      Laboratory Tests      1/5/19 14:09            PAST, FAMILY   Past Medical History      Past Medical History:  Depression      Hematology/Oncology (F):  Anemia, Lymphoma (HODGKIKS)            Past Surgical History      Biopsy, VAD Placement            Family History      Family History:  Lymphoma            Social History      Lives independently:  Yes      Occupation:  DISABLED             Tobacco Use      Tobacco status:  Current every day smoker      Smoking packs/day:  1      Smoking history:  25-50 pack years            Alcohol Use      Alcohol intake:  None            Substance Use      Substance use:  Denies use            REVIEW OF SYSTEMS      General:  Admits: Fatigue;          Denies: Appetite Change, Fever, Night Sweats, Weight Gain, Weight Loss      Eye:  Denies: Blurred Vision, Corrective Lenses, Diplopia, Eye Irritation, Eye     Pain, Eye Redness, Spots in Vision, Vision Loss      ENT:  Denies: Headache, Hearing Loss, Hoarseness, Seizures, Sinus Congestion,     Sore Throat      Cardiovascular:  Denies: Chest Pain, Edema Ankles, Edema Legs, Irregular     Heartbeat, Palpitations      Respiratory:  Denies: Coughing Blood, Productive Cough, Shortness of Air,     Wheezing      Gastrointestinal:  Denies: Bloody Stools, Constipation, Diarrhea, Frequent     Heartburn, Nausea, Problem Swallowing, Tarry Stools, Unable to Control Bowels,     Vomiting      Genitourinary (female):  Denies: Blood in Urine, Decrease Urine Stream, Frequent    Urination, Incontinence, Painful Urination      Musculoskeletal:  Denies: Back Pain, Leg Cramps, Muscle Pain, Muscle Weakness,     Painful Joints, Swollen Joints      Integumentary:  Denies: Bleeds Easily, Bruises Easily, Hair Changes, Jaundice,     Lesions, Mole Changes, Nail Changes, Pigment Changes, Rash, Skin Discoloration      Neurologic:  Denies: Dizziness, Fainting, Numbness\Tingling, Paralysis, Seizures      Psychiatric:  Denies: Anxiety, Confused, Depression, Disoriented, Memory Loss      Endocrine:  Denies: Cold Intolerance, Diabetes, Excessive Sweating, Excessive     Thirst, Excessive Urination, Heat Intolerance, Flushing, Hyperthyroidism,     Hypothyroidism      Hematologic/Lymphatic:  Admits: Bruising;          Denies: Bleeding, Enlarged Lymph Nodes, Recurrent Infections, Transfusions      Reproductive:  Admits: Menopause;          Denies:  Heavy Periods, Pregnant, Still Menstruating            VITAL SIGNS AND SCORES      Vitals      Height 5 ft 10.00 in / 177.8 cm      Weight 179 lbs 14.326 oz / 81.6 kg      BSA 2.00 m2      BMI 25.8 kg/m2      Temperature 97.6 F / 36.44 C - Temporal      Pulse 93      Blood Pressure 122/63 Sitting, Left Arm      Pulse Oximetry 97%, ROOM AIR            Pain Score      Experiencing any pain?:  No      Pain Scale Used:  Numerical      Pain Intensity:  0            Fatigue Score      Experiencing any fatigue?:  No      Fatigue (0-10 scale):  0 (none)            EXAM      General Appearance:  Positive for: Alert, Oriented x3, Cooperative      Eye:  Positive for: Moist Conjunctiva, PERRLA, Reactive to Light      HEENT:  Positive for: Oropharynx clear, Dentition      Neck:  Positive for: Full ROM, Supple      Respiratory:  Positive for: CTAB, Normal Respiratory Effort      Abdomen/Gastro:  Positive for: Normal Active Bowel Sounds, Soft;          Negative for: Tenderness      Cardiovascular:  Positive for: Murmur, Peripheral Edema      Skin:  Positive for: Normal Temperature, Normal Texture and Turgor, Normal Tone,    Ulcers      Psychiatric:  Positive for: AAO X 3, Appropriate Affect, Intact Judgement      Neurologic:  Positive for: Cranial Ner II-XII Intact, Deep Tendon Reflexes,     Weakness      Genitourinary:  Positive for: Bladder Distention      Musculoskeletal:  Positive for: Full ROM Lower Extremety, Full ROM Upper     Extremety      Lower Extremities:  Positive for: Normal Gait and Station, Pedal Pulses Intact,     Pedal Pulses Symetrical      Lymphatic:  Negative for: Axillary, Cervical, Supraclavicular            PREVENTION      Hx Influenza Vaccination:  No      Influenza Vaccine Declined:  Yes      2 or More Falls Past Year?:  No      Fall Past Year with Injury?:  No      Hx Pneumococcal Vaccination:  No      Encouraged to follow-up with:  PCP regarding preventative exams.      Chart initiated by      KEYANNA  "CHIN [facility]            ALLERGY/MEDS      Allergies      Coded Allergies:             AMOXICILLIN (Verified  Allergy, Severe, ANAPHALAX, 1/14/19)           PENICILLINS (Verified  Allergy, Severe, TROUBLE BREATHING, 1/14/19)           SULFA (SULFONAMIDE ANTIBIOTICS) (Verified  Allergy, Severe, TIGHTNESS IN     THROAT, TROUBLE BREATHING,        CEFAZOLIN (Verified  Allergy, Unknown, SWELLING IN LIPS, 1/14/19)           CEPHALEXIN (Verified  Allergy, Unknown, SWELLING IN LIPS, 1/14/19)           CEPHALOSPORINS (Verified  Allergy, Unknown, SWELLING IN LIPS, 1/14/19)           CLARITHROMYCIN (Verified  Allergy, Unknown, UNKNOWN, 1/14/19)           DIPHENHYDRAMINE HCL (Verified  Allergy, Unknown, UNK, 1/14/19)           DOXYCYCLINE (Verified  Allergy, Unknown, \"pt reports red bumps on my eyes     and makes them swell\", 1/14/19)           IODINE AND IODIDE CONTAINING PRODUC (Verified  Allergy, Unknown, HIVES,     ITCHING, 1/14/19)           PROCHLORPERAZINE (Verified  Allergy, Unknown, HIVES, ITCHING, 1/14/19)           PROMETHAZINE (Verified  Allergy, Unknown, HIVES, ITCHING, 1/14/19)           ASPIRIN (Verified  Adverse Reaction, Unknown, \"INTERACTS WITH COUMADIN\",     1/14/19)           EGG (Verified  Adverse Reaction, Unknown, VOMITING, 1/14/19)           IODINATED CONTRAST- ORAL AND IV DYE (Verified  Adverse Reaction, Unknown,     SEVERE PAIN IN EXTREMITY IN WHICH MEDICATION WAS GIVEN , 1/14/19)           LORAZEPAM (Verified  Adverse Reaction, Unknown, SEVERE DREAMS, 1/14/19)           NSAIDS (NON-STEROIDAL ANTI-INFLAMMA (Verified  Adverse Reaction, Unknown,     LEG CRAMPS, 1/14/19)           ZOLPIDEM TARTRATE (Verified  Adverse Reaction, Unknown, MAKES VERY     EMOTIONAL, 1/14/19)            Medications      Last Reconciled on 1/14/19 13:38 by GINNY JOHN      Moxifloxacin Hcl (Moxeza) 3 Ml Drops.visc      1 DROPS, BOTTLE         Reported         1/14/19       ALPRAZolam (Xanax) 0.25 Mg Tablet      0.25 MG PO " TID PRN for ANXIETY, TAB 0 Refills         Reported         12/14/18       Gabapentin (Gabapentin) 250 Mg/5 Ml Solution      600 MG PO TID, #1080 ML         Reported         12/14/18       Acetaminophen (Tylenol) 325 Mg Tablet      650 MG PO Q4H PRN for PAIN OR FEVER, #100 TAB 0 Refills         Reported         12/28/17       Hydromorphone HCl (Dilaudid) Unknown Strength Ampul      CONTINUOUS, AMP         Reported         10/12/17      Medications Reviewed:  Changes made to meds            IMPRESSION/PLAN      Impression      -Hodgkin's lymphoma      -Patient has had 2 separate episodes of Hodgkin's lymphoma.  She was originally     diagnosed in 2008.  Was treated with ABVD for 3 cycles.  Achieved complete     response.      -Patient relapsed in 2011.  Patient was treated with 6 cycles of ABVD with     radiation treatment of the residual lesion.      -Patient has palpable lymphadenopathy in the left supraclavicular lymph node and    left inguinal lymphadenopathy.      -Patient was seen for urgent care visit for worsening back pain with B symptoms.     We discussed the need for PET CT since there is a strong concern for Hodgkin's     lymphoma recurrence..      -PET/CT in December 2017 did not show evidence of cancer recurrence.      -Currently in remission.  Still has persistent inguinal lymphadenopathy and neck    lymphadenopathy      -No new lymphadenopathy on today's exam.      -No signs and symptoms of recurrence.  No B symptoms      -Cured Lymphoma; Remission since March 2013.       Scans in January 2019 show stable lymphadenopathy.             Left breast with discharge      Reports left breast with greenish yellow discharge for the last 2 weeks.       Reports left axillary tenderness.       Unable to stand for mammogram.       Breast US and mammogram show no signs of malignancy in either breast.       Recommend surgical consultation with Dr. Finnegan for evaluation for left breast     brownish discharge.              -Right lower extremity cellulitis      -Patient was seen clinic.  Was noted to have erythematous and warm to touch     right thigh with an open pustule.      -Counseled patient to go to an emergency room for consideration of inpatient     antibiotics.      -Improved with antibiotics.  Continue antibiotics prescribed by primary care     physician      -Boils right thigh; not cancer related prescribed steroid cream.       -Close observation with PCP.      -Improving.              Medical Evaluation of Acute and Chronic Medical Conditions today      -Right hip pain      -Per patient had trauma to the area      -Pain with tenderness      -Order right hip x-ray      -PCP+ pain management; not cancer related      -Anxiety      -Well controlled currently.      -Continue current medications-      -Refill Xanax, 0.5 mg BID, # 60 tabs, no refills.            Diagnosis      Notes      New Medications      * Moxifloxacin Hcl (Moxeza) 3 ML DROPS.VISC: 1 DROPS      Discontinued Medications      * Erythromycin (Erythromycin 0.5% Ophth*) 3.5 GM OINT...G.: 1 APL EYE EACH TID 7      Days #1      * Levofloxacin (Levaquin*) 500 MG TABLET: 500 MG PO QDAY 10 Days #10      * FERROUS SULFATE (Ferrous Sulfate*) 325 MG TABLET: 325 MG PO BID 30 Days #60      New Referrals      * Surgery, As Soon As Possible         Ryan Peña         Dx: Left breast abscess - N61.1            Plan      No labs today.       Follow up with vascular surgeon, Dr. Herrera as [facility] at [facility].      Surgical consult with Dr. Peña for left breast discharge.       Return in 6 months for follow up for Hodgkin's lymphoma.       Follow up with PCP for Xanax scripts.       Call with any questions or concerns.            Patient Education      Patient Education Provided:  Yes            Topics Patient Counseled on      heart murmur      Surgical consult                 Disclaimer: Converted document may not contain table formatting or lab diagrams. Please see  "[facility]" System for the authenticated document.

## 2021-06-06 ENCOUNTER — HOSPITAL ENCOUNTER (INPATIENT)
Facility: HOSPITAL | Age: 56
LOS: 1 days | Discharge: HOME HEALTH | End: 2021-06-07
Attending: EMERGENCY MEDICINE | Admitting: EMERGENCY MEDICINE

## 2021-06-06 DIAGNOSIS — R60.0 PEDAL EDEMA: ICD-10-CM

## 2021-06-06 DIAGNOSIS — G89.4 CHRONIC PAIN SYNDROME: ICD-10-CM

## 2021-06-06 DIAGNOSIS — T07.XXXA WOUNDS, MULTIPLE: Primary | ICD-10-CM

## 2021-06-06 DIAGNOSIS — E11.622 TYPE 2 DIABETES MELLITUS WITH OTHER SKIN ULCER, WITHOUT LONG-TERM CURRENT USE OF INSULIN (HCC): ICD-10-CM

## 2021-06-06 DIAGNOSIS — Z74.09 IMMOBILITY: ICD-10-CM

## 2021-06-06 DIAGNOSIS — Z74.09 LIMITED MOBILITY: ICD-10-CM

## 2021-06-06 PROBLEM — K21.9 GERD (GASTROESOPHAGEAL REFLUX DISEASE): Status: ACTIVE | Noted: 2021-06-06

## 2021-06-06 PROBLEM — C80.1 CANCER: Status: ACTIVE | Noted: 2021-06-06

## 2021-06-06 PROBLEM — I50.9 CHF (CONGESTIVE HEART FAILURE): Status: ACTIVE | Noted: 2020-01-16

## 2021-06-06 PROBLEM — Z95.2 S/P TAVR (TRANSCATHETER AORTIC VALVE REPLACEMENT): Status: ACTIVE | Noted: 2021-04-29

## 2021-06-06 PROBLEM — E11.9 DIABETES MELLITUS, TYPE 2: Status: ACTIVE | Noted: 2020-01-16

## 2021-06-06 PROBLEM — F41.9 ANXIETY DISORDER: Status: ACTIVE | Noted: 2020-01-16

## 2021-06-06 PROBLEM — J45.909 ASTHMA: Status: ACTIVE | Noted: 2021-06-06

## 2021-06-06 PROBLEM — J44.9 CHRONIC OBSTRUCTIVE PULMONARY DISEASE: Status: ACTIVE | Noted: 2020-01-16

## 2021-06-06 PROBLEM — N28.9 KIDNEY DISEASE: Status: ACTIVE | Noted: 2021-06-06

## 2021-06-06 PROBLEM — J30.9 ALLERGIC RHINITIS DUE TO ALLERGEN: Status: ACTIVE | Noted: 2020-01-16

## 2021-06-06 PROBLEM — F32.A DEPRESSION: Status: ACTIVE | Noted: 2021-06-06

## 2021-06-06 PROBLEM — I50.43 ACUTE ON CHRONIC COMBINED SYSTOLIC (CONGESTIVE) AND DIASTOLIC (CONGESTIVE) HEART FAILURE: Status: ACTIVE | Noted: 2021-04-29

## 2021-06-06 PROBLEM — M19.90 ARTHRITIS: Status: ACTIVE | Noted: 2020-01-16

## 2021-06-06 PROBLEM — S81.802A MULTIPLE OPEN WOUNDS OF LEFT LOWER EXTREMITY: Status: ACTIVE | Noted: 2021-06-06

## 2021-06-06 LAB
ALBUMIN SERPL-MCNC: 3.9 G/DL (ref 3.5–5.2)
ALBUMIN/GLOB SERPL: 1.3 G/DL
ALP SERPL-CCNC: 71 U/L (ref 39–117)
ALT SERPL W P-5'-P-CCNC: 7 U/L (ref 1–33)
ANION GAP SERPL CALCULATED.3IONS-SCNC: 6.7 MMOL/L (ref 5–15)
AST SERPL-CCNC: 12 U/L (ref 1–32)
BASOPHILS # BLD AUTO: 0.01 10*3/MM3 (ref 0–0.2)
BASOPHILS NFR BLD AUTO: 0.2 % (ref 0–1.5)
BILIRUB SERPL-MCNC: 0.2 MG/DL (ref 0–1.2)
BUN SERPL-MCNC: 12 MG/DL (ref 6–20)
BUN/CREAT SERPL: 25.5 (ref 7–25)
CALCIUM SPEC-SCNC: 8.5 MG/DL (ref 8.6–10.5)
CHLORIDE SERPL-SCNC: 101 MMOL/L (ref 98–107)
CO2 SERPL-SCNC: 28.3 MMOL/L (ref 22–29)
CREAT SERPL-MCNC: 0.47 MG/DL (ref 0.57–1)
D-LACTATE SERPL-SCNC: 1.3 MMOL/L (ref 0.5–2)
DEPRECATED RDW RBC AUTO: 44.2 FL (ref 37–54)
EOSINOPHIL # BLD AUTO: 0.12 10*3/MM3 (ref 0–0.4)
EOSINOPHIL NFR BLD AUTO: 2.2 % (ref 0.3–6.2)
ERYTHROCYTE [DISTWIDTH] IN BLOOD BY AUTOMATED COUNT: 14.1 % (ref 12.3–15.4)
GFR SERPL CREATININE-BSD FRML MDRD: 138 ML/MIN/1.73
GLOBULIN UR ELPH-MCNC: 3 GM/DL
GLUCOSE SERPL-MCNC: 197 MG/DL (ref 65–99)
HCT VFR BLD AUTO: 37.1 % (ref 34–46.6)
HGB BLD-MCNC: 11.6 G/DL (ref 12–15.9)
HOLD SPECIMEN: NORMAL
HOLD SPECIMEN: NORMAL
IMM GRANULOCYTES # BLD AUTO: 0.01 10*3/MM3 (ref 0–0.05)
IMM GRANULOCYTES NFR BLD AUTO: 0.2 % (ref 0–0.5)
LYMPHOCYTES # BLD AUTO: 0.82 10*3/MM3 (ref 0.7–3.1)
LYMPHOCYTES NFR BLD AUTO: 15.3 % (ref 19.6–45.3)
MCH RBC QN AUTO: 26.9 PG (ref 26.6–33)
MCHC RBC AUTO-ENTMCNC: 31.3 G/DL (ref 31.5–35.7)
MCV RBC AUTO: 86.1 FL (ref 79–97)
MONOCYTES # BLD AUTO: 0.44 10*3/MM3 (ref 0.1–0.9)
MONOCYTES NFR BLD AUTO: 8.2 % (ref 5–12)
NEUTROPHILS NFR BLD AUTO: 3.95 10*3/MM3 (ref 1.7–7)
NEUTROPHILS NFR BLD AUTO: 73.9 % (ref 42.7–76)
NRBC BLD AUTO-RTO: 0 /100 WBC (ref 0–0.2)
PLATELET # BLD AUTO: 153 10*3/MM3 (ref 140–450)
PMV BLD AUTO: 10.6 FL (ref 6–12)
POTASSIUM SERPL-SCNC: 3.9 MMOL/L (ref 3.5–5.2)
PROT SERPL-MCNC: 6.9 G/DL (ref 6–8.5)
RBC # BLD AUTO: 4.31 10*6/MM3 (ref 3.77–5.28)
SODIUM SERPL-SCNC: 136 MMOL/L (ref 136–145)
WBC # BLD AUTO: 5.35 10*3/MM3 (ref 3.4–10.8)
WHOLE BLOOD HOLD SPECIMEN: NORMAL
WHOLE BLOOD HOLD SPECIMEN: NORMAL

## 2021-06-06 PROCEDURE — 85025 COMPLETE CBC W/AUTO DIFF WBC: CPT | Performed by: EMERGENCY MEDICINE

## 2021-06-06 PROCEDURE — 80053 COMPREHEN METABOLIC PANEL: CPT | Performed by: EMERGENCY MEDICINE

## 2021-06-06 PROCEDURE — 83605 ASSAY OF LACTIC ACID: CPT | Performed by: EMERGENCY MEDICINE

## 2021-06-06 PROCEDURE — 36415 COLL VENOUS BLD VENIPUNCTURE: CPT | Performed by: EMERGENCY MEDICINE

## 2021-06-06 PROCEDURE — 94799 UNLISTED PULMONARY SVC/PX: CPT

## 2021-06-06 PROCEDURE — 87040 BLOOD CULTURE FOR BACTERIA: CPT | Performed by: EMERGENCY MEDICINE

## 2021-06-06 PROCEDURE — 99285 EMERGENCY DEPT VISIT HI MDM: CPT

## 2021-06-06 PROCEDURE — 36415 COLL VENOUS BLD VENIPUNCTURE: CPT

## 2021-06-06 PROCEDURE — 99223 1ST HOSP IP/OBS HIGH 75: CPT | Performed by: HOSPITALIST

## 2021-06-06 RX ORDER — HYDROXYZINE HYDROCHLORIDE 25 MG/1
25 TABLET, FILM COATED ORAL 4 TIMES DAILY PRN
Status: DISCONTINUED | OUTPATIENT
Start: 2021-06-06 | End: 2021-06-07 | Stop reason: HOSPADM

## 2021-06-06 RX ORDER — LORAZEPAM 0.5 MG/1
0.5 TABLET ORAL EVERY 6 HOURS PRN
Status: DISCONTINUED | OUTPATIENT
Start: 2021-06-06 | End: 2021-06-07 | Stop reason: HOSPADM

## 2021-06-06 RX ORDER — BISACODYL 5 MG/1
5 TABLET, DELAYED RELEASE ORAL DAILY PRN
Status: DISCONTINUED | OUTPATIENT
Start: 2021-06-06 | End: 2021-06-07 | Stop reason: HOSPADM

## 2021-06-06 RX ORDER — LIDOCAINE 50 MG/G
1 PATCH TOPICAL
Status: DISCONTINUED | OUTPATIENT
Start: 2021-06-07 | End: 2021-06-07 | Stop reason: HOSPADM

## 2021-06-06 RX ORDER — BISACODYL 10 MG
10 SUPPOSITORY, RECTAL RECTAL DAILY PRN
Status: DISCONTINUED | OUTPATIENT
Start: 2021-06-06 | End: 2021-06-07 | Stop reason: HOSPADM

## 2021-06-06 RX ORDER — TRAMADOL HYDROCHLORIDE 50 MG/1
25 TABLET ORAL EVERY 6 HOURS PRN
Status: DISCONTINUED | OUTPATIENT
Start: 2021-06-06 | End: 2021-06-07 | Stop reason: HOSPADM

## 2021-06-06 RX ORDER — SODIUM CHLORIDE 0.9 % (FLUSH) 0.9 %
10 SYRINGE (ML) INJECTION EVERY 12 HOURS SCHEDULED
Status: DISCONTINUED | OUTPATIENT
Start: 2021-06-06 | End: 2021-06-07 | Stop reason: HOSPADM

## 2021-06-06 RX ORDER — FAMOTIDINE 20 MG/1
40 TABLET, FILM COATED ORAL DAILY
Status: DISCONTINUED | OUTPATIENT
Start: 2021-06-07 | End: 2021-06-07 | Stop reason: HOSPADM

## 2021-06-06 RX ORDER — SODIUM CHLORIDE 0.9 % (FLUSH) 0.9 %
10 SYRINGE (ML) INJECTION AS NEEDED
Status: DISCONTINUED | OUTPATIENT
Start: 2021-06-06 | End: 2021-06-07 | Stop reason: HOSPADM

## 2021-06-06 RX ORDER — IPRATROPIUM BROMIDE AND ALBUTEROL SULFATE 2.5; .5 MG/3ML; MG/3ML
3 SOLUTION RESPIRATORY (INHALATION) EVERY 4 HOURS PRN
Status: DISCONTINUED | OUTPATIENT
Start: 2021-06-06 | End: 2021-06-07 | Stop reason: HOSPADM

## 2021-06-06 RX ORDER — POLYETHYLENE GLYCOL 3350 17 G/17G
17 POWDER, FOR SOLUTION ORAL DAILY PRN
Status: DISCONTINUED | OUTPATIENT
Start: 2021-06-06 | End: 2021-06-07 | Stop reason: HOSPADM

## 2021-06-06 RX ORDER — METHOCARBAMOL 500 MG/1
500 TABLET, FILM COATED ORAL EVERY 8 HOURS PRN
Status: DISCONTINUED | OUTPATIENT
Start: 2021-06-06 | End: 2021-06-07 | Stop reason: HOSPADM

## 2021-06-06 RX ORDER — GABAPENTIN 100 MG/1
100 CAPSULE ORAL EVERY 8 HOURS SCHEDULED
Status: DISCONTINUED | OUTPATIENT
Start: 2021-06-06 | End: 2021-06-07 | Stop reason: HOSPADM

## 2021-06-06 RX ORDER — CHOLECALCIFEROL (VITAMIN D3) 125 MCG
5 CAPSULE ORAL NIGHTLY PRN
Status: DISCONTINUED | OUTPATIENT
Start: 2021-06-06 | End: 2021-06-07 | Stop reason: HOSPADM

## 2021-06-06 RX ORDER — MULTIPLE VITAMINS W/ MINERALS TAB 9MG-400MCG
1 TAB ORAL DAILY
Status: DISCONTINUED | OUTPATIENT
Start: 2021-06-07 | End: 2021-06-07

## 2021-06-06 RX ORDER — NITROGLYCERIN 0.4 MG/1
0.4 TABLET SUBLINGUAL
Status: DISCONTINUED | OUTPATIENT
Start: 2021-06-06 | End: 2021-06-07 | Stop reason: HOSPADM

## 2021-06-06 RX ORDER — AMOXICILLIN 250 MG
2 CAPSULE ORAL 2 TIMES DAILY
Status: DISCONTINUED | OUTPATIENT
Start: 2021-06-06 | End: 2021-06-07 | Stop reason: HOSPADM

## 2021-06-06 RX ORDER — TROLAMINE SALICYLATE 10 G/100G
1 CREAM TOPICAL 4 TIMES DAILY PRN
Status: DISCONTINUED | OUTPATIENT
Start: 2021-06-06 | End: 2021-06-07 | Stop reason: HOSPADM

## 2021-06-06 RX ORDER — ACETAMINOPHEN 500 MG
1000 TABLET ORAL 3 TIMES DAILY
Status: DISCONTINUED | OUTPATIENT
Start: 2021-06-06 | End: 2021-06-07 | Stop reason: HOSPADM

## 2021-06-06 RX ORDER — OXYCODONE HYDROCHLORIDE 5 MG/1
5 TABLET ORAL EVERY 4 HOURS PRN
Status: DISCONTINUED | OUTPATIENT
Start: 2021-06-06 | End: 2021-06-07 | Stop reason: HOSPADM

## 2021-06-06 NOTE — ED NOTES
Pt has attempted to void in BSC multiple times. Pt continues to fall asleep while sitting on bsc.      Sobeida Weeks RN  06/06/21 9812

## 2021-06-06 NOTE — ED PROVIDER NOTES
Subjective     History provided by:  Patient  Leg Pain  Location:  Leg  Leg location:  L leg and R leg  Pain details:     Severity:  Moderate    Progression:  Worsening  Chronicity:  Recurrent  Dislocation: no    Foreign body present:  No foreign bodies      Review of Systems   Musculoskeletal:        Bilateral LE pain    All other systems reviewed and are negative.      Past Medical History:   Diagnosis Date   • Anxiety     NO CURRENT MEDICATION   • Aortic stenosis, severe    • Arthritis    • Arthritis    • Asthma    • Back pain    • Blood clotting disorder (CMS/HCC)    • Cancer associated pain    • Chronic low back pain with sciatica    • Diabetes mellitus (CMS/HCC)     TYPE 2   • Dyspnea on effort    • GERD (gastroesophageal reflux disease)    • Hiatal hernia    • History of kidney stones    • History of transfusion    • Hodgkin's lymphoma (CMS/HCC)    • Immobility    • Lupus (CMS/HCC)    • Other pulmonary embolism without acute cor pulmonale (CMS/HCC)    • Pelvic pain    • Peripheral neuropathy    • Presence of intrathecal pump    • Sacroiliac joint disease    • SI (sacroiliac) joint inflammation (CMS/HCC)        Allergies   Allergen Reactions   • Amoxicillin Shortness Of Breath   • Ceclor [Cefaclor] Shortness Of Breath   • Penicillins Shortness Of Breath   • Sulfa Antibiotics Rash   • Doxycycline GI Intolerance   • Vancomycin Itching   • Compazine [Prochlorperazine Edisylate] Rash   • Contrast Dye Other (See Comments)     Caused pain in her arm   • Phenergan [Promethazine Hcl] Rash       Past Surgical History:   Procedure Laterality Date   • BACK SURGERY     • BLADDER REPAIR     • CARDIAC CATHETERIZATION N/A 3/6/2019    Procedure: Valvuloplasty;  Surgeon: Wayne Johnson MD;  Location: [facility] INVASIVE LOCATION;  Service: Cardiology   • CHOLECYSTECTOMY     • HYSTERECTOMY     • LYMPHADENECTOMY     • PAIN PUMP INSERTION/REVISION     • PAIN PUMP INSERTION/REVISION N/A 10/18/2019    Procedure: PAIN PUMP  [facility] 10-18-19 PEDRO;  Surgeon: Horace Rios MD;  Location: [facility] MAIN OR;  Service: Pain Management   • PAIN PUMP INSERTION/REVISION N/A 11/23/2020    Procedure: pain pump removal;  Surgeon: Horace Rios MD;  Location: [facility] MAIN OR;  Service: Pain Management;  Laterality: N/A;   • TONSILLECTOMY     • TUBAL ABDOMINAL LIGATION     • TUMOR REMOVAL         Family History   Problem Relation Age of Onset   • Pancreatic cancer Sister    • Pancreatic cancer Paternal Grandmother    • Malig Hyperthermia Neg Hx        Social History     Socioeconomic History   • Marital status:      Spouse name: Not on file   • Number of children: Not on file   • Years of education: Not on file   • Highest education level: Not on file   Tobacco Use   • Smoking status: Current Every Day Smoker     Packs/day: 2.00     Years: 41.00     Pack years: 82.00     Types: Cigarettes   • Smokeless tobacco: Never Used   Substance and Sexual Activity   • Alcohol use: No   • Drug use: No   • Sexual activity: Defer         Objective   Physical Exam  Vitals and nursing note reviewed.   Constitutional:       General: She is not in acute distress.  HENT:      Head: Normocephalic and atraumatic.      Nose: Nose normal.      Mouth/Throat:      Mouth: Mucous membranes are moist.   Eyes:      General: No scleral icterus.  Neck:      Thyroid: No thyromegaly.   Cardiovascular:      Rate and Rhythm: Normal rate and regular rhythm.      Heart sounds: Normal heart sounds. No murmur heard.     Pulmonary:      Effort: No respiratory distress.      Breath sounds: Normal breath sounds.   Abdominal:      Palpations: Abdomen is soft.      Tenderness: There is no abdominal tenderness.      Hernia: No hernia is present.   Musculoskeletal:         General: Normal range of motion.      Cervical back: Normal range of motion and neck supple. No tenderness.      Right lower leg: Edema present.      Left lower leg: Edema present.   Skin:      General: Skin is warm and dry.      Findings: No erythema.      Comments: chronic venous stasis changes including hemosiderin deposits, mulitple superficial ulcerations to lower extremities    Neurological:      General: No focal deficit present.      Mental Status: She is alert. Mental status is at baseline.      Sensory: No sensory deficit.      Motor: No weakness.   Psychiatric:         Behavior: Behavior normal.         Procedures         ED Course          Patient was seen eval by me in the ER.  I discussed the case with the hospitalist for admission.  The patient is that she cannot go home.  States that she was in too much pain in the last time she needed IV antibiotics.  I talked spoke with Dr. Casiano who is very graciously agreed this patient for IV antibiotics and wound care.                                  MDM  Number of Diagnoses or Management Options     Amount and/or Complexity of Data Reviewed  Clinical lab tests: reviewed  Review and summarize past medical records: yes  Discuss the patient with other providers: yes    Risk of Complications, Morbidity, and/or Mortality  Presenting problems: moderate  Management options: moderate        Final diagnoses:   None       Documentation assistance provided by rio Shultz.  Information recorded by the scribe was done at my direction and has been verified and validated by me.     Melanie Shultz  06/06/21 1700       Melanie Shultz  06/06/21 1709       Robbie Langston DO  06/11/21 0420

## 2021-06-07 ENCOUNTER — READMISSION MANAGEMENT (OUTPATIENT)
Dept: CALL CENTER | Facility: HOSPITAL | Age: 56
End: 2021-06-07

## 2021-06-07 VITALS
DIASTOLIC BLOOD PRESSURE: 86 MMHG | BODY MASS INDEX: 27.07 KG/M2 | OXYGEN SATURATION: 95 % | SYSTOLIC BLOOD PRESSURE: 128 MMHG | HEART RATE: 93 BPM | HEIGHT: 66 IN | TEMPERATURE: 97.6 F | WEIGHT: 168.43 LBS | RESPIRATION RATE: 18 BRPM

## 2021-06-07 LAB
ANION GAP SERPL CALCULATED.3IONS-SCNC: 5 MMOL/L (ref 5–15)
BACTERIA UR QL AUTO: ABNORMAL /HPF
BILIRUB UR QL STRIP: NEGATIVE
BUN SERPL-MCNC: 10 MG/DL (ref 6–20)
BUN/CREAT SERPL: 17.5 (ref 7–25)
CALCIUM SPEC-SCNC: 8.4 MG/DL (ref 8.6–10.5)
CHLORIDE SERPL-SCNC: 102 MMOL/L (ref 98–107)
CLARITY UR: CLEAR
CO2 SERPL-SCNC: 30 MMOL/L (ref 22–29)
COLOR UR: YELLOW
CREAT SERPL-MCNC: 0.57 MG/DL (ref 0.57–1)
DEPRECATED RDW RBC AUTO: 44.9 FL (ref 37–54)
ERYTHROCYTE [DISTWIDTH] IN BLOOD BY AUTOMATED COUNT: 14.1 % (ref 12.3–15.4)
GFR SERPL CREATININE-BSD FRML MDRD: 110 ML/MIN/1.73
GLUCOSE BLDC GLUCOMTR-MCNC: 134 MG/DL (ref 70–130)
GLUCOSE BLDC GLUCOMTR-MCNC: 171 MG/DL (ref 70–130)
GLUCOSE SERPL-MCNC: 118 MG/DL (ref 65–99)
GLUCOSE UR STRIP-MCNC: NEGATIVE MG/DL
HCT VFR BLD AUTO: 39.3 % (ref 34–46.6)
HGB BLD-MCNC: 12 G/DL (ref 12–15.9)
HGB UR QL STRIP.AUTO: ABNORMAL
HYALINE CASTS UR QL AUTO: ABNORMAL /LPF
KETONES UR QL STRIP: NEGATIVE
LEUKOCYTE ESTERASE UR QL STRIP.AUTO: ABNORMAL
MAGNESIUM SERPL-MCNC: 1.9 MG/DL (ref 1.6–2.6)
MCH RBC QN AUTO: 26.7 PG (ref 26.6–33)
MCHC RBC AUTO-ENTMCNC: 30.5 G/DL (ref 31.5–35.7)
MCV RBC AUTO: 87.3 FL (ref 79–97)
NITRITE UR QL STRIP: NEGATIVE
NT-PROBNP SERPL-MCNC: 448.1 PG/ML (ref 0–900)
PH UR STRIP.AUTO: 6.5 [PH] (ref 5–8)
PHOSPHATE SERPL-MCNC: 3.9 MG/DL (ref 2.5–4.5)
PLATELET # BLD AUTO: 144 10*3/MM3 (ref 140–450)
PMV BLD AUTO: 11.1 FL (ref 6–12)
POTASSIUM SERPL-SCNC: 4 MMOL/L (ref 3.5–5.2)
PROT UR QL STRIP: NEGATIVE
RBC # BLD AUTO: 4.5 10*6/MM3 (ref 3.77–5.28)
RBC # UR: ABNORMAL /HPF
REF LAB TEST METHOD: ABNORMAL
SODIUM SERPL-SCNC: 137 MMOL/L (ref 136–145)
SP GR UR STRIP: 1.01 (ref 1–1.03)
SQUAMOUS #/AREA URNS HPF: ABNORMAL /HPF
TSH SERPL DL<=0.05 MIU/L-ACNC: 1.24 UIU/ML (ref 0.27–4.2)
UROBILINOGEN UR QL STRIP: ABNORMAL
WBC # BLD AUTO: 5.02 10*3/MM3 (ref 3.4–10.8)
WBC UR QL AUTO: ABNORMAL /HPF

## 2021-06-07 PROCEDURE — 81001 URINALYSIS AUTO W/SCOPE: CPT | Performed by: HOSPITALIST

## 2021-06-07 PROCEDURE — 94640 AIRWAY INHALATION TREATMENT: CPT

## 2021-06-07 PROCEDURE — 83735 ASSAY OF MAGNESIUM: CPT | Performed by: HOSPITALIST

## 2021-06-07 PROCEDURE — 94799 UNLISTED PULMONARY SVC/PX: CPT

## 2021-06-07 PROCEDURE — 84443 ASSAY THYROID STIM HORMONE: CPT | Performed by: HOSPITALIST

## 2021-06-07 PROCEDURE — 99239 HOSP IP/OBS DSCHRG MGMT >30: CPT | Performed by: INTERNAL MEDICINE

## 2021-06-07 PROCEDURE — 84100 ASSAY OF PHOSPHORUS: CPT | Performed by: HOSPITALIST

## 2021-06-07 PROCEDURE — 82962 GLUCOSE BLOOD TEST: CPT

## 2021-06-07 PROCEDURE — 83880 ASSAY OF NATRIURETIC PEPTIDE: CPT | Performed by: HOSPITALIST

## 2021-06-07 PROCEDURE — 25010000002 FUROSEMIDE PER 20 MG: Performed by: HOSPITALIST

## 2021-06-07 PROCEDURE — 80048 BASIC METABOLIC PNL TOTAL CA: CPT | Performed by: HOSPITALIST

## 2021-06-07 PROCEDURE — 85027 COMPLETE CBC AUTOMATED: CPT | Performed by: HOSPITALIST

## 2021-06-07 PROCEDURE — 63710000001 INSULIN LISPRO (HUMAN) PER 5 UNITS: Performed by: HOSPITALIST

## 2021-06-07 PROCEDURE — 25010000002 ENOXAPARIN PER 10 MG: Performed by: HOSPITALIST

## 2021-06-07 RX ORDER — NICOTINE POLACRILEX 4 MG
15 LOZENGE BUCCAL
Status: DISCONTINUED | OUTPATIENT
Start: 2021-06-07 | End: 2021-06-07 | Stop reason: HOSPADM

## 2021-06-07 RX ORDER — CLINDAMYCIN HYDROCHLORIDE 300 MG/1
300 CAPSULE ORAL 3 TIMES DAILY
Qty: 42 CAPSULE | Refills: 0 | Status: SHIPPED | OUTPATIENT
Start: 2021-06-07 | End: 2021-06-21

## 2021-06-07 RX ORDER — DEXTROSE MONOHYDRATE 100 MG/ML
25 INJECTION, SOLUTION INTRAVENOUS
Status: DISCONTINUED | OUTPATIENT
Start: 2021-06-07 | End: 2021-06-07 | Stop reason: HOSPADM

## 2021-06-07 RX ORDER — FUROSEMIDE 10 MG/ML
40 INJECTION INTRAMUSCULAR; INTRAVENOUS
Status: DISCONTINUED | OUTPATIENT
Start: 2021-06-07 | End: 2021-06-07 | Stop reason: HOSPADM

## 2021-06-07 RX ORDER — ARFORMOTEROL TARTRATE 15 UG/2ML
15 SOLUTION RESPIRATORY (INHALATION)
Status: DISCONTINUED | OUTPATIENT
Start: 2021-06-07 | End: 2021-06-07 | Stop reason: HOSPADM

## 2021-06-07 RX ORDER — IPRATROPIUM BROMIDE AND ALBUTEROL SULFATE 2.5; .5 MG/3ML; MG/3ML
3 SOLUTION RESPIRATORY (INHALATION)
Status: DISCONTINUED | OUTPATIENT
Start: 2021-06-07 | End: 2021-06-07 | Stop reason: HOSPADM

## 2021-06-07 RX ADMIN — ACETAMINOPHEN 1000 MG: 500 TABLET, FILM COATED ORAL at 00:58

## 2021-06-07 RX ADMIN — INSULIN LISPRO 2 UNITS: 100 INJECTION, SOLUTION INTRAVENOUS; SUBCUTANEOUS at 13:27

## 2021-06-07 RX ADMIN — LIDOCAINE 1 PATCH: 50 PATCH CUTANEOUS at 09:52

## 2021-06-07 RX ADMIN — Medication 5 MG: at 01:00

## 2021-06-07 RX ADMIN — IPRATROPIUM BROMIDE AND ALBUTEROL SULFATE 3 ML: .5; 3 SOLUTION RESPIRATORY (INHALATION) at 07:18

## 2021-06-07 RX ADMIN — FUROSEMIDE 40 MG: 10 INJECTION, SOLUTION INTRAMUSCULAR; INTRAVENOUS at 00:59

## 2021-06-07 RX ADMIN — OXYCODONE HYDROCHLORIDE 5 MG: 5 TABLET ORAL at 07:07

## 2021-06-07 RX ADMIN — DOXYCYCLINE 100 MG: 100 INJECTION, POWDER, LYOPHILIZED, FOR SOLUTION INTRAVENOUS at 00:59

## 2021-06-07 RX ADMIN — IPRATROPIUM BROMIDE AND ALBUTEROL SULFATE 3 ML: .5; 3 SOLUTION RESPIRATORY (INHALATION) at 01:46

## 2021-06-07 RX ADMIN — GABAPENTIN 100 MG: 100 CAPSULE ORAL at 07:07

## 2021-06-07 RX ADMIN — GABAPENTIN 100 MG: 100 CAPSULE ORAL at 01:00

## 2021-06-07 RX ADMIN — LORAZEPAM 0.5 MG: 0.5 TABLET ORAL at 01:00

## 2021-06-07 RX ADMIN — FAMOTIDINE 40 MG: 20 TABLET, FILM COATED ORAL at 09:51

## 2021-06-07 RX ADMIN — ACETAMINOPHEN 1000 MG: 500 TABLET, FILM COATED ORAL at 09:57

## 2021-06-07 RX ADMIN — OXYCODONE HYDROCHLORIDE 5 MG: 5 TABLET ORAL at 01:00

## 2021-06-07 RX ADMIN — FUROSEMIDE 40 MG: 10 INJECTION, SOLUTION INTRAMUSCULAR; INTRAVENOUS at 09:51

## 2021-06-07 RX ADMIN — GABAPENTIN 100 MG: 100 CAPSULE ORAL at 13:27

## 2021-06-07 RX ADMIN — SODIUM CHLORIDE, PRESERVATIVE FREE 10 ML: 5 INJECTION INTRAVENOUS at 01:01

## 2021-06-07 RX ADMIN — ENOXAPARIN SODIUM 40 MG: 40 INJECTION SUBCUTANEOUS at 00:59

## 2021-06-07 RX ADMIN — DOXYCYCLINE 100 MG: 100 INJECTION, POWDER, LYOPHILIZED, FOR SOLUTION INTRAVENOUS at 09:51

## 2021-06-07 RX ADMIN — DOCUSATE SODIUM 50MG AND SENNOSIDES 8.6MG 2 TABLET: 8.6; 5 TABLET, FILM COATED ORAL at 00:56

## 2021-06-07 RX ADMIN — IPRATROPIUM BROMIDE AND ALBUTEROL SULFATE 3 ML: .5; 3 SOLUTION RESPIRATORY (INHALATION) at 14:07

## 2021-06-07 RX ADMIN — DOCUSATE SODIUM 50MG AND SENNOSIDES 8.6MG 2 TABLET: 8.6; 5 TABLET, FILM COATED ORAL at 09:51

## 2021-06-07 RX ADMIN — SODIUM CHLORIDE, PRESERVATIVE FREE 10 ML: 5 INJECTION INTRAVENOUS at 09:51

## 2021-06-07 NOTE — SIGNIFICANT NOTE
Consult completed  Pt reports areas on BLE x 2 years, skin is hairless from below the knees   Red tight skin noted  Pt states legs itch and then she scratches and new wounds open    Medial left lower leg is noted with thick yellow drainage  Lateral right lower leg is noted with serosang drainage    Scattered other wounds to BLE irregular shaped ulcers with differing wound beds from red granular to superifical areas    Dorsal feet also noted with serous blistering and purple discolorations

## 2021-06-07 NOTE — H&P
[facility]   HISTORY AND PHYSICAL    Patient Name: Isha Llanes  : 1965  MRN: [id number]  Primary Care Physician:  Damari Cornejo PA-C  Date of admission: 2021    Subjective B/L leg wounds.  Patient complaining of severe pain.    Subjective     Chief Complaint: B/L leg wounds.      History of Present Illness Unable to get any accurate history.  Patient repeatedly dosing off to sleep.  She states that she has not slept for a couple of days and is complaining of severe pain in her legs from chronic wounds.  She also complains that wound itch severely.      Patient wears oxygen at home.  She only uses it when she feels like it.      Past medical history is significant for COPD, B/L leg wounds, IDDM, Aortic stenosis, chronic pain.        Review of Systems   Unable to perform ROS: Other    Patient keeps falling asleep.     Personal History     Past Medical History:   Diagnosis Date   • Anxiety     NO CURRENT MEDICATION   • Aortic stenosis, severe    • Arthritis    • Arthritis    • Asthma    • Back pain    • Blood clotting disorder (CMS/HCC)    • Cancer associated pain    • Chronic low back pain with sciatica    • Diabetes mellitus (CMS/HCC)     TYPE 2   • Dyspnea on effort    • GERD (gastroesophageal reflux disease)    • Hiatal hernia    • History of kidney stones    • History of transfusion    • Hodgkin's lymphoma (CMS/HCC)    • Immobility    • Lupus (CMS/HCC)    • Other pulmonary embolism without acute cor pulmonale (CMS/HCC)    • Pelvic pain    • Peripheral neuropathy    • Presence of intrathecal pump    • Sacroiliac joint disease    • SI (sacroiliac) joint inflammation (CMS/HCC)        Past Surgical History:   Procedure Laterality Date   • BACK SURGERY     • BLADDER REPAIR     • CARDIAC CATHETERIZATION N/A 3/6/2019    Procedure: Valvuloplasty;  Surgeon: Wayne Johnson MD;  Location: [facility] INVASIVE LOCATION;  Service: Cardiology   • CHOLECYSTECTOMY     • HYSTERECTOMY     •  LYMPHADENECTOMY     • PAIN PUMP INSERTION/REVISION     • PAIN PUMP INSERTION/REVISION N/A 10/18/2019    Procedure: PAIN PUMP INSERTION [facility] 10-18-19 [place];  Surgeon: Horace Rios MD;  Location: [facility] OR;  Service: Pain Management   • PAIN PUMP INSERTION/REVISION N/A 11/23/2020    Procedure: pain pump removal;  Surgeon: Horace Rios MD;  Location: [facility] OR;  Service: Pain Management;  Laterality: N/A;   • TONSILLECTOMY     • TUBAL ABDOMINAL LIGATION     • TUMOR REMOVAL         Family History: family history includes Pancreatic cancer in her paternal grandmother and sister. Otherwise pertinent FHx was reviewed and not pertinent to current issue.    Social History:  reports that she has been smoking cigarettes. She has a 82.00 pack-year smoking history. She has never used smokeless tobacco. She reports that she does not drink alcohol and does not use drugs.    Home Medications:  HYDROcodone-acetaminophen, HYDROmorphone, Insulin Lispro, acetaminophen, albuterol sulfate HFA, clindamycin, furosemide, gabapentin, glucose blood, levoFLOXacin, nystatin, and pancrelipase (Lip-Prot-Amyl)    Allergies:  Allergies   Allergen Reactions   • Amoxicillin Shortness Of Breath   • Ceclor [Cefaclor] Shortness Of Breath   • Penicillins Shortness Of Breath   • Sulfa Antibiotics Rash   • Doxycycline GI Intolerance   • Vancomycin Itching   • Compazine [Prochlorperazine Edisylate] Rash   • Contrast Dye Other (See Comments)     Caused pain in her arm   • Phenergan [Promethazine Hcl] Rash       Objective    Objective     Vitals:   Temp:  [98.8 °F (37.1 °C)-100.9 °F (38.3 °C)] 100.9 °F (38.3 °C)  Heart Rate:  [] 124  Resp:  [19-21] 20  BP: ()/(53-97) 118/86  Flow (L/min):  [2-3] 3    Physical Exam  Constitutional:       General: She is not in acute distress.     Appearance: Normal appearance.   HENT:      Head: Normocephalic and atraumatic.      Nose: Nose normal.      Mouth/Throat:      Mouth: Mucous  membranes are moist.   Eyes:      Extraocular Movements: Extraocular movements intact.      Pupils: Pupils are equal, round, and reactive to light.   Cardiovascular:      Rate and Rhythm: Tachycardia present.      Heart sounds: Murmur heard.     Pulmonary:      Effort: Pulmonary effort is normal.      Breath sounds: Normal breath sounds.   Abdominal:      General: Abdomen is flat. Bowel sounds are normal.      Palpations: Abdomen is soft.   Musculoskeletal:      Right lower leg: Edema present.      Left lower leg: Edema present.   Skin:     General: Skin is warm.      Findings: Lesion present.   Neurological:      General: No focal deficit present.   Psychiatric:         Mood and Affect: Mood normal.         Result Review    Result Review:  I have personally reviewed the results from the time of this admission to 6/7/2021 00:31 EDT and agree with these findings:  [x]  Laboratory  []  Microbiology  []  Radiology  [x]  EKG/Telemetry   []  Cardiology/Vascular   []  Pathology  []  Old records  []  Other:  Most notable findings include: Low grade fever.  Tachycardia on telemetry  Assessment/Plan   Assessment / Plan     Brief Patient Summary:  Isha Llanes is a 55 y.o. female who has multiple B/L open wounds on her legs and edema.  She complains of pain and states that she cannot stand the pain any longer.      Active Hospital Problems:  Active Hospital Problems    Diagnosis    • Multiple open wounds of left lower extremity    • Pedal edema    • Wounds, multiple      Plan: Admit to observation status    #1 Multiple open wounds on both lower extremities:   -Wound care  -Pain control  -Started on doxycycline because she has low grade fever and tachycardia  -atarax for itching  -continue lasix.   -follow BCs.     #2  IDDM-  -POC glucose and ISS    #3 COPD (mild exacerbation)  -ariel earl    #4 history of TAVR procedure    #5 Mild exacerbation of diastolic CHF  -continue lasix    #6 Sinus tachycardia-  Check mag,  TSH     DVT prophylaxis:Lovenox  Medical DVT prophylaxis orders are present.    CODE STATUS:    Level Of Support Discussed With: Patient  Code Status: CPR  Medical Interventions (Level of Support Prior to Arrest): Full    Admission Status:  I believe this patient meets observation status.    Electronically signed by Milena Casiano DO, 06/07/21, 12:31 AM EDT.

## 2021-06-07 NOTE — CONSULTS
Nutrition Services    Patient Name: Isha Llanes  YOB: 1965  MRN: [id number]  Admission date: 6/6/2021        CLINICAL NUTRITION ASSESSMENT      Reason for Assessment  Physician consult, Need for education     H&P:      Past Medical History:   Diagnosis Date   • Anxiety     NO CURRENT MEDICATION   • Aortic stenosis, severe    • Arthritis    • Arthritis    • Asthma    • Back pain    • Blood clotting disorder (CMS/HCC)    • Cancer associated pain    • Chronic low back pain with sciatica    • Diabetes mellitus (CMS/HCC)     TYPE 2   • Dyspnea on effort    • GERD (gastroesophageal reflux disease)    • Hiatal hernia    • History of kidney stones    • History of transfusion    • Hodgkin's lymphoma (CMS/HCC)    • Immobility    • Lupus (CMS/HCC)    • Other pulmonary embolism without acute cor pulmonale (CMS/HCC)    • Pelvic pain    • Peripheral neuropathy    • Presence of intrathecal pump    • Sacroiliac joint disease    • SI (sacroiliac) joint inflammation (CMS/HCC)           Current Problems:   Active Hospital Problems    Diagnosis    • **Multiple open wounds of left lower extremity    • Pedal edema    • Wounds, multiple    • Depression    • Anxiety disorder    • Chronic pain syndrome    • Chronic low back pain with bilateral sciatica           Nutrition/Diet History         Narrative     Consult to provide patient with diet education. Patient currently on a consistent carbohydrate diet, regular consistency. Attempted to educate the patient on carb consistent diet, but patient was extremely lethargic, snoring, and difficult to arouse at time of visit. After multiple unsuccessful attempts to wake the patient, I placed the materials near the patient's belongings as she is expected to discharge today per chart review. I informed the patient I was doing so, and received no response. Materials regarding diet have been provided.    Factors Affecting   Nutritional Intake      Anthropometrics        Current  "Height, Weight Height: 167.6 cm (66\")  Weight: 76.4 kg (168 lb 6.9 oz)        Admit Height, Weight Flowsheet Rows      First Filed Value   Admission Height  167.6 cm (66\") Documented at 06/06/2021 1408   Admission Weight  79.3 kg (174 lb 13.2 oz) Documented at 06/06/2021 1408             Ideal Body Weight (IBW) Ideal Body Weight (IBW) (kg): 59.58   % Ideal Body Weight % Ideal Body Weight: 128.24        Usual Body Weight UTD   % Usual Body Weight UTD   Wt Change Observation    Weight Hx    Wt Readings from Last 30 Encounters:   06/07/21 0700 76.4 kg (168 lb 6.9 oz)   06/06/21 2320 76.4 kg (168 lb 6.9 oz)   06/06/21 1408 79.3 kg (174 lb 13.2 oz)   05/21/21 0000 77.1 kg (170 lb)   04/07/21 0000 81.6 kg (180 lb)   02/26/21 0000 81.6 kg (180 lb)   01/21/21 0000 76.2 kg (168 lb)   11/23/20 0911 80.1 kg (176 lb 8 oz)   11/20/20 1324 75.8 kg (167 lb)   09/16/20 0000 78.6 kg (173 lb 4 oz)   09/15/20 1048 75.8 kg (167 lb)   06/23/20 1109 75.8 kg (167 lb)   06/08/20 0000 78.9 kg (174 lb)   05/28/20 0000 79.1 kg (174 lb 6 oz)   04/13/20 0000 73.9 kg (163 lb)   03/24/20 1051 75.8 kg (167 lb)   03/09/20 1408 76 kg (167 lb 9.6 oz)   02/17/20 0000 73.9 kg (163 lb)   02/04/20 0000 75.9 kg (167 lb 4 oz)   01/29/20 1139 73 kg (161 lb)   01/21/20 1247 73 kg (161 lb)   01/16/20 0000 75.7 kg (167 lb)   11/25/19 1308 75.3 kg (166 lb)   10/31/19 1216 75.3 kg (166 lb)   10/18/19 1105 75.3 kg (166 lb)   10/17/19 1043 75.3 kg (166 lb)   09/30/19 1136 75.3 kg (166 lb)   08/13/19 1331 75.3 kg (166 lb)   07/24/19 1250 75.3 kg (166 lb)   06/12/19 1031 75.4 kg (166 lb 3.2 oz)   05/10/19 1313 79.5 kg (175 lb 4.3 oz)   03/06/19 0957 81.6 kg (180 lb)   02/27/19 1417 79.8 kg (176 lb)        BMI kg/m2 Body mass index is 27.19 kg/m².       Estimated/Assessed Needs       Energy Requirements    Height for Calculation  Height: 167.6 cm (66\")   Weight for Calculation 59.3 kg   Method for Estimation  -IBW   EST Needs (kcal/day) -5902-4600 kcal       Protein " Requirements    Weight for Calculation -59.3 kg   EST Protein Needs (g/kg) -1.0-1.2 g/kg   EST Daily Needs (g/day) -60-70 g/day       Fluid Requirements     Estimated Needs (mL/day) -8990-0564 ml/day       Fluid Deficit    Current Na Level (mEq/L) -137   Desired Na Level (mEq/L) -140   Estimated Fluid Deficit (L)  -     Labs/Medications         Pertinent Labs -   Results from last 7 days   Lab Units 06/07/21  0650 06/06/21  1429   SODIUM mmol/L 137 136   POTASSIUM mmol/L 4.0 3.9   CHLORIDE mmol/L 102 101   CO2 mmol/L 30.0* 28.3   BUN mg/dL 10 12   CREATININE mg/dL 0.57 0.47*   CALCIUM mg/dL 8.4* 8.5*   BILIRUBIN mg/dL  --  0.2   ALK PHOS U/L  --  71   ALT (SGPT) U/L  --  7   AST (SGOT) U/L  --  12   GLUCOSE mg/dL 118* 197*     Results from last 7 days   Lab Units 06/07/21  0650   MAGNESIUM mg/dL 1.9   PHOSPHORUS mg/dL 3.9   HEMOGLOBIN g/dL 12.0   HEMATOCRIT % 39.3     Coronavirus (COVID-19)   Date Value Ref Range Status   04/03/2021 NOT DETECTED NA Final     Comment:     The SARS-CoV-2 assay is a real-time, RT-PCR test intended  for the qualitative detection of nucleic acid from the  SARS-CoV-2 in respiratory specimens from individuals,  testing performed at [facility].       Lab Results   Component Value Date    HGBA1C 7.6 (H) 04/26/2021         Pertinent Medications Reviewed.     Physical Findings          Edema  Moderately severe 3+     Skin Bilateral lower leg diabetic ulcers     --  Current Nutrition Orders & Evaluation of Intake       Oral Nutrition     Current PO Diet Diet Regular; Consistent Carbohydrate   Supplement -None   PO Evaluation     % PO Intake -%   --  Clinical Course    Nutrition Course Details Patient expected to discharge today per chart review.     Nutrition Diagnosis         Nutrition Dx Problem 1 -Food and nutrition knowledge deficit related to limited adherence to nutrition recommendations as evidenced by multiple diabetic ulcers, elevated blood glucose and A1C.            Nutrition Intervention        RD Action -Attempted to provide nutrition education regarding carb consistent diet. Left materials at bedside.   --  Monitor/Evaluation        Monitor Pertinent labs, Skin status     Electronically signed by:  Meredith Garcia RD  06/07/21 13:55 EDT

## 2021-06-07 NOTE — PLAN OF CARE
"Goal Outcome Evaluation: 02 sats were 74% on room air upon arrival from ED. 02 at 3L/NC placed on pt.  Pt is now on 2L/NC. Pt requires frequent reminders to keep NC on to maintain appropriate o2 sats.  On continuous pulse ox and telemetry.  Pt request pain medication frequently despite drowsiness.  Up to the BSC with assist x 1.  Irritable with treatment and care.  Pt stated, \"I leave the hospital all the time because I'm not going to feel like I'm in penitentiary, with everybody bothering me all the time.\"   Plan of Care Reviewed With: patient        "

## 2021-06-07 NOTE — DISCHARGE SUMMARY
[facility]         HOSPITALIST  DISCHARGE SUMMARY    Patient Name: Isha Llanes  : 1965  MRN: [id number]    Date of Admission: 2021  Date of Discharge:  2021  Primary Care Physician: Damari Cornejo PA-C    Consults     Date and Time Order Name Status Description    2021  7:33 PM Inpatient Hospitalist Consult Completed           Active and Resolved Hospital Problems:  Active Hospital Problems    Diagnosis POA   • **Multiple open wounds of left lower extremity [S81.802A] Unknown   • Pedal edema [R60.0] Yes   • Wounds, multiple [T07.XXXA] Yes   • Depression [F32.9] Yes   • Anxiety disorder [F41.9] Yes   • Chronic pain syndrome [G89.4] Yes   • Chronic low back pain with bilateral sciatica [M54.41, M54.42, G89.29] Yes      Resolved Hospital Problems   No resolved problems to display.       Hospital Course     Hospital Course:  Isha Llanes is a 55 y.o. female with past medical history significant for chronic bilateral lower extremity ulcerations, back pain, chronic pain with sciatica, diabetes, presence of intrathecal pump, diabetes    Patient presented to the hospital due to pain in her chronic lower extremities.  Stating that the pain is simply become too much to tolerate at home.  On exam patient with bilateral lower extremity edema, erythema, warmth.  Patient started with antibiotics appropriate for cellulitis given her chronic wounds.  Patient did not meet criteria for sepsis on admission.  Patient discharged on appropriate antibiotics.  Patient also set up with home health with wound care, as well as wound care outpatient clinic.  Patient resumed on her other home medications as indicated on discharge.  Patient was not interested in being evaluated for SNF discharge, was agreeable to home health.  Patient recommended to follow-up with her PCP closely.  Patient is currently looking for a new pain management specialist.  She does have a pain pump.  Patient seen  on date of discharge, clinically and hemodynamically stable.  Patient provided concerning signs and symptoms prompting immediate medical attention, patient understanding and agreeable     DISCHARGE Follow Up Recommendations for labs and diagnostics:   Wound care clinic  Home health wound care  PCP  Pain management      Day of Discharge     Vital Signs:  Temp:  [98 °F (36.7 °C)-100.9 °F (38.3 °C)] 98 °F (36.7 °C)  Heart Rate:  [] 86  Resp:  [16-21] 16  BP: ()/(53-97) 132/84  Flow (L/min):  [2-3] 3  Physical Exam:   Constitutional: No acute distress, chronically ill-appearing  HEENT: Moist mucous membranes, poor dentition station  Cardiovascular: Regular rate and rhythm no murmurs rubs gallops  Pulmonary: Diminished breath sounds bilaterally, prolonged expiratory phase  Abdomen: Soft nondistended, nontender to palpation  Skin: Bilateral lower extremities with several scabs, no fluctuation nor palpable abscess.  Patient has erythema, warmth, edema  Neuro: Cranial nerves grossly intact, full strength and sensation      Discharge Details        Discharge Medications      Continue These Medications      Instructions Start Date   Accu-Chek Guide test strip  Generic drug: glucose blood   USE AS DIRECTED TO test blood sugars FOUR TIMES DAILY      acetaminophen 500 MG tablet  Commonly known as: TYLENOL   1,000-1,500 mg, Oral, Every 6 Hours PRN      albuterol sulfate  (90 Base) MCG/ACT inhaler  Commonly known as: PROVENTIL HFA;VENTOLIN HFA;PROAIR HFA   2 puffs, Inhalation, Every 4 Hours PRN      clindamycin 300 MG capsule  Commonly known as: Cleocin   300 mg, Oral, 3 Times Daily      Creon 15015-48937 units capsule delayed-release particles capsule  Generic drug: pancrelipase (Lip-Prot-Amyl)   No dose, route, or frequency recorded.      furosemide 40 MG tablet  Commonly known as: LASIX   40 mg, Oral, Daily      gabapentin 300 MG capsule  Commonly known as: NEURONTIN   TAKE ONE CAPSULE BY MOUTH THREE TIMES  DAILY      HYDROcodone-acetaminophen 5-325 MG per tablet  Commonly known as: NORCO   1 tablet, Oral, 2 Times Daily PRN      HYDROmorphone 1 mg/mL solution  Commonly known as: DILAUDID   Intravenous, Titrated, PER PAIN PUMP      Insulin Lispro 100 UNIT/ML injection pen  Commonly known as: ADMELOG SOLOSTAR   Inject 12 Units under the skin into the appropriate area as directed. SLIDING SCALE R/T BS      nystatin 216901 UNIT/GM powder  Generic drug: nystatin   Topical, As Needed         Stop These Medications    levoFLOXacin 750 MG tablet  Commonly known as: Levaquin            Allergies   Allergen Reactions   • Amoxicillin Shortness Of Breath   • Ceclor [Cefaclor] Shortness Of Breath   • Penicillins Shortness Of Breath   • Sulfa Antibiotics Rash   • Doxycycline GI Intolerance   • Vancomycin Itching   • Compazine [Prochlorperazine Edisylate] Rash   • Contrast Dye Other (See Comments)     Caused pain in her arm   • Phenergan [Promethazine Hcl] Rash       Discharge Disposition:  Home-Health Care [facility]    Diet:  Hospital:  Diet Order   Procedures   • Diet Regular; Consistent Carbohydrate       Discharge Activity:   Activity Instructions     Activity as Tolerated            CODE STATUS:  Code Status and Medical Interventions:   Ordered at: 06/06/21 2026     Level Of Support Discussed With:    Patient     Code Status:    CPR     Medical Interventions (Level of Support Prior to Arrest):    Full         Future Appointments   Date Time Provider Department Center   8/3/2021  9:30 AM Yoseph Jacobs MD [facility]       Additional Instructions for the Follow-ups that You Need to Schedule     Discharge Follow-up with PCP   As directed       Currently Documented PCP:    Damari Cornejo PA-C    PCP Phone Number:    [phone]     Follow Up Details: In less than one week         Referral to Wound Clinic   As directed            Pertinent  and/or Most Recent Results       LAB RESULTS:      Lab 06/07/21  0650 06/06/21  1429   WBC  5.02 5.35   HEMOGLOBIN 12.0 11.6*   HEMATOCRIT 39.3 37.1   PLATELETS 144 153   NEUTROS ABS  --  3.95   IMMATURE GRANS (ABS)  --  0.01   LYMPHS ABS  --  0.82   MONOS ABS  --  0.44   EOS ABS  --  0.12   MCV 87.3 86.1   LACTATE  --  1.3         Lab 06/07/21  0650 06/06/21  1429   SODIUM 137 136   POTASSIUM 4.0 3.9   CHLORIDE 102 101   CO2 30.0* 28.3   ANION GAP 5.0 6.7   BUN 10 12   CREATININE 0.57 0.47*   GLUCOSE 118* 197*   CALCIUM 8.4* 8.5*   MAGNESIUM 1.9  --    PHOSPHORUS 3.9  --    TSH 1.240  --          Lab 06/06/21  1429   TOTAL PROTEIN 6.9   ALBUMIN 3.90   GLOBULIN 3.0   ALT (SGPT) 7   AST (SGOT) 12   BILIRUBIN 0.2   ALK PHOS 71         Lab 06/07/21  0650   PROBNP 448.1                 Brief Urine Lab Results  (Last result in the past 365 days)      Color   Clarity   Blood   Leuk Est   Nitrite   Protein   CREAT   Urine HCG        06/07/21 0143 Yellow Clear Moderate (2+) Large (3+) Negative Negative             Microbiology Results (last 10 days)     Procedure Component Value - Date/Time    Blood Culture - Blood, Arm, Left [[id number]] Collected: 06/06/21 1422    Lab Status: Preliminary result Specimen: Blood from Arm, Left Updated: 06/07/21 1446     Blood Culture No growth at 24 hours               Results for orders placed in visit on 08/06/18    SCANNED VASCULAR STUDIES      Results for orders placed in visit on 08/06/18    SCANNED VASCULAR STUDIES      Results for orders placed during the hospital encounter of 03/06/19    Adult Transthoracic Echo Limited W/ Cont if Necessary Per Protocol    Interpretation Summary  · Estimated EF appears to be in the range of 61 - 65%  · Left ventricular wall thickness is consistent with mild concentric hypertrophy.  · Normal right ventricular cavity size and systolic function noted.  · There is moderate to severe aortic stenosis (peak gradient 51 mmHg, mean gradient 30 mmHg, aortic valve area calculated at 0.68 cm²)..  · There is severe nodular calcification of the posterior  mitral valve annulus with extension  · There is no evidence of pericardial effusion.      Labs Pending at Discharge:  Pending Labs     Order Current Status    Blood Culture - Blood, Arm, Right In process    Blood Culture - Blood, Arm, Left Preliminary result            Time spent on Discharge including face to face service:  35 minutes    Electronically signed by Hardeep Rose MD, 06/07/21, 3:43 PM EDT.

## 2021-06-07 NOTE — SIGNIFICANT NOTE
Wash hands prior to wound care and after. Wash legs with soap and water, pat dry. Do not rub or scratch. Apply clean, dry dressing to open areas and secure with gauze wrap. Follow up with home health and wound clinic as directed.

## 2021-06-08 ENCOUNTER — TRANSITIONAL CARE MANAGEMENT TELEPHONE ENCOUNTER (OUTPATIENT)
Dept: CALL CENTER | Facility: HOSPITAL | Age: 56
End: 2021-06-08

## 2021-06-08 NOTE — OUTREACH NOTE
Prep Survey      Responses   [facility] facility patient discharged from?  Go   Is LACE score < 7 ?  No   Emergency Room discharge w/ pulse ox?  No   Eligibility  TCM   Hospital  Go   Date of Admission  06/06/21   Date of Discharge  06/07/21   Discharge Disposition  Home-Health Care [facility]   Discharge diagnosis  multiple open wounds of LLE, DM, chronic pain with sciatica   Does the patient have one of the following disease processes/diagnoses(primary or secondary)?  Other   Does the patient have Home health ordered?  Yes   What is the Home health agency?   request sent to [facility]    Is there a DME ordered?  No   Comments regarding appointments  wound care clinic   Prep survey completed?  Yes          Adriana Zelaya RN

## 2021-06-08 NOTE — OUTREACH NOTE
"Call Center TCM Note      Responses   [facility] patient discharged from?  Go   Does the patient have one of the following disease processes/diagnoses(primary or secondary)?  Other   TCM attempt successful?  Yes [verbal release is over a year old however according to case management notes pt lives with adult children]   Call start time  0826   Call end time  0833   Discharge diagnosis  multiple open wounds of LLE, DM, chronic pain with sciatica   Meds reviewed with patient/caregiver?  Yes   Is the patient having any side effects they believe may be caused by any medication additions or changes?  Yes   Side effects comments   Pt reports she doesn't feel \"too good\" r/t ABX   Does the patient have all medications ordered at discharge?  Yes   Is the patient taking all medications as directed (includes completed medication regime)?  Yes   Comments regarding appointments  Encouraged wound care clinic call   Does the patient have a primary care provider?   Yes   Does the patient have an appointment with their PCP within 7 days of discharge?  Greater than 7 days   Comments regarding PCP  Hospital d/c f/u appt is on 6/21/21 at 3:30 pm    What is preventing the patient from scheduling follow up appointments within 7 days of discharge?  Earlier appointment not available   Nursing Interventions  Verified appointment date/time/provider   Has the patient kept scheduled appointments due by today?  N/A   What is the Home health agency?   [facility]   Has home health visited the patient within 72 hours of discharge?  No   Psychosocial issues?  No   Did the patient receive a copy of their discharge instructions?  Yes   Nursing interventions  Reviewed instructions with patient   What is the patient's perception of their health status since discharge?  Same   Is the patient/caregiver able to teach back signs and symptoms related to disease process for when to call PCP?  Yes   Is the " patient/caregiver able to teach back signs and symptoms related to disease process for when to call 911?  Yes   Is the patient/caregiver able to teach back the hierarchy of who to call/visit for symptoms/problems? PCP, Specialist, Home health nurse, Urgent Care, ED, 911  Yes   If the patient is a current smoker, are they able to teach back resources for cessation?  Smoking cessation medications [Pt is cutting down. ]   TCM call completed?  Yes          Lety Coleman RN    6/8/2021, 08:40 EDT

## 2021-06-11 LAB
BACTERIA SPEC AEROBE CULT: NORMAL
BACTERIA SPEC AEROBE CULT: NORMAL

## 2021-06-15 ENCOUNTER — OFFICE VISIT (OUTPATIENT)
Dept: WOUND CARE | Facility: HOSPITAL | Age: 56
End: 2021-06-15

## 2021-06-15 ENCOUNTER — READMISSION MANAGEMENT (OUTPATIENT)
Dept: CALL CENTER | Facility: HOSPITAL | Age: 56
End: 2021-06-15

## 2021-06-15 VITALS
TEMPERATURE: 97.2 F | WEIGHT: 168 LBS | BODY MASS INDEX: 27 KG/M2 | HEART RATE: 100 BPM | SYSTOLIC BLOOD PRESSURE: 94 MMHG | DIASTOLIC BLOOD PRESSURE: 62 MMHG | HEIGHT: 66 IN

## 2021-06-15 DIAGNOSIS — L97.921 ULCERS OF BOTH LOWER LEGS, LIMITED TO BREAKDOWN OF SKIN (HCC): Primary | ICD-10-CM

## 2021-06-15 DIAGNOSIS — L97.911 ULCERS OF BOTH LOWER LEGS, LIMITED TO BREAKDOWN OF SKIN (HCC): Primary | ICD-10-CM

## 2021-06-15 DIAGNOSIS — I87.303 VENOUS HYPERTENSION OF BOTH LOWER EXTREMITIES: ICD-10-CM

## 2021-06-15 DIAGNOSIS — R60.0 PEDAL EDEMA: ICD-10-CM

## 2021-06-15 PROCEDURE — 99213 OFFICE O/P EST LOW 20 MIN: CPT | Performed by: INTERNAL MEDICINE

## 2021-06-15 PROCEDURE — G0463 HOSPITAL OUTPT CLINIC VISIT: HCPCS | Performed by: INTERNAL MEDICINE

## 2021-06-15 RX ORDER — GABAPENTIN 300 MG/1
300 CAPSULE ORAL EVERY 8 HOURS SCHEDULED
COMMUNITY
End: 2021-07-02 | Stop reason: SDUPTHER

## 2021-06-15 RX ORDER — INSULIN DEGLUDEC INJECTION 100 U/ML
INJECTION, SOLUTION SUBCUTANEOUS
COMMUNITY
End: 2021-07-14

## 2021-06-15 RX ORDER — FUROSEMIDE 40 MG/1
40 TABLET ORAL DAILY
COMMUNITY
Start: 2021-04-12 | End: 2021-06-21

## 2021-06-15 NOTE — SIGNIFICANT NOTE
Epithelial %:  Exposed Bone: no    Exposed Tendon: no    Impression: worsening    Short Term Goals: INCREASE GRANULATION, DECREASE WOUND SIZE

## 2021-06-15 NOTE — PROGRESS NOTES
"Chief Complaint  Wound Check (BLE ULCERS, DM )    Subjective            Objective     Vitals:    06/15/21 1520   BP: 94/62   BP Location: Left arm   Patient Position: Sitting   Pulse: 100   Temp: 97.2 °F (36.2 °C)   TempSrc: Temporal   Weight: 76.2 kg (168 lb)   Height: 167.6 cm (66\")   PainSc:   8   PainLoc: Leg  Comment: BLE         I have reviewed the HPI and ROS as documented by MA/RN. Goyo Dixon MD    Physical Exam this patient lives with her daughter and does not like  her .  It seems that she has difficulty with all sorts of medications and treatments.She had her aortic valve replaced 5 weeks ago by catheter placement because of previous congestive heart failure.She also has a diagnosis of COPD.She had remote history of Hodgkin's disease with treatment. She has had deep vein thrombosis and pulmonary emboli. She had a lower back fusion for disc disease. She has stiffness in her lower back and hips and seems unable to elevate her legs as she should.  Her legs show about 2+ edema bilaterally.  There are numerous ulcerations over the both legs which she attributes to itching and scratching.  None of these appear infected at this point.  She has been using Silvadene on these recently but says that this causes burning discomfort and and she is quit using itShe did resort to using some mupirocin ointment which she had previously tried. Sheusually does an Ace wrap around the legs.  She says she was unable to tolerate Unna boots in the past.There is some indication of dry skin in her legs. I have advised her that she can continue to use the mupirocin on open ulcerated areas and apply an Ace wrap over this as tolerate.Otherwise she should use Vaseline over both legs from the upper leg to the toes every day to prevent dryness.  I did not advise any additional medications.    Wound Description:        Result Review :   The following data was reviewed by: Goyo Dixon MD on 06/15/2021:        "        Assessment and Plan    Diagnoses and all orders for this visit:    1. Ulcers of both lower legs, limited to breakdown of skin (CMS/HCC) (Primary)    2. Pedal edema    3. Venous hypertension of both lower extremities            Follow Up   Return in about 3 weeks (around 7/6/2021).  Patient was given instructions and counseling regarding her condition or for health maintenance advice. Please see specific information pulled into the AVS if appropriate.

## 2021-06-15 NOTE — OUTREACH NOTE
"Medical Week 2 Survey      Responses   [facility] patient discharged from?  Go   Does the patient have one of the following disease processes/diagnoses(primary or secondary)?  Other   Week 2 attempt successful?  Yes   Call start time  0906   Discharge diagnosis  Multiple open wounds of LLE, DM, chronic pain with sciatica   Call end time  0920   Meds reviewed with patient/caregiver?  Yes   Is the patient having any side effects they believe may be caused by any medication additions or changes?  Yes   Side effects comments   \"Started to give me Cdiff\" [ Patient communicated she stopped taking independently w/o notifying MD for replacement.  Patient reported green diarrhea.  This RN discussed importance of ATB for wound healing,  will update PCP. ]   Does the patient have all medications ordered at discharge?  Yes   Is the patient taking all medications as directed (includes completed medication regime)?  No   What is preventing the patient from taking all medications as directed?  Side effects, Doesn't understand importance   Nursing Interventions  Notified provider   Comments regarding appointments  Reminded her of Wound Care Clinic appt today and PCP appt on 6/21/21   Does the patient have a primary care provider?   Yes   Does the patient have an appointment with their PCP within 7 days of discharge?  Greater than 7 days   Comments regarding PCP  Hospital d/c f/u appt is on 6/21/21 at 3:30 pm    What is preventing the patient from scheduling follow up appointments within 7 days of discharge?  Earlier appointment not available   Nursing Interventions  Verified appointment date/time/provider   Has the patient kept scheduled appointments due by today?  N/A   What is the Home health agency?   Referral that was sent to [facility] was declined.   Home health interventions  Called Home Health agency   Home health comments  Called [facility] as referral had been placed with agency after [facility] declined.  Spoke with " Fariha who communicated [facility] also declined.  Will route to  to place another referral.   Notified Case Management  Home Health   Did the patient receive a copy of their discharge instructions?  Yes   Nursing interventions  Reviewed instructions with patient   What is the patient's perception of their health status since discharge?  Same   Is the patient/caregiver able to teach back signs and symptoms related to disease process for when to call PCP?  Yes   Is the patient/caregiver able to teach back the hierarchy of who to call/visit for symptoms/problems? PCP, Specialist, Home health nurse, Urgent Care, ED, 911  Yes   Additional teach back comments  Encouraged compliance with f/u's and medication regimen   Week 2 Call Completed?  Yes          Dionne Mccann RN

## 2021-06-16 ENCOUNTER — TELEPHONE (OUTPATIENT)
Dept: WOUND CARE | Facility: HOSPITAL | Age: 56
End: 2021-06-16

## 2021-06-16 NOTE — TELEPHONE ENCOUNTER
CONTACTED [facility] TO SEE IF PATIENT WAS CURRENT WITH THEM FOR HOME HEALTH SERVICES.  [facility]  STATED PATIENT WAS NOT ACCEPTED DUE TO INSURANCE AND STAFFING ISSUES.    CONTACTED [facility] TO REORDER SUPPLIES FOR PATIENT, REP STATED SHE WAS NOT DUE FOR A REORDER UNTIL 6/21/21 AND TO CALL AT THAT TIME TO REORDER.

## 2021-06-17 DIAGNOSIS — L97.909 ULCER OF LOWER EXTREMITY, UNSPECIFIED LATERALITY, UNSPECIFIED ULCER STAGE (HCC): Primary | ICD-10-CM

## 2021-06-18 ENCOUNTER — TELEPHONE (OUTPATIENT)
Dept: INTERNAL MEDICINE | Facility: CLINIC | Age: 56
End: 2021-06-18

## 2021-06-18 DIAGNOSIS — I87.2 CHRONIC VENOUS STASIS DERMATITIS OF BOTH LOWER EXTREMITIES: Primary | ICD-10-CM

## 2021-06-18 NOTE — TELEPHONE ENCOUNTER
I put in Home Health referral yesterday. Usually wound care (which is what I ordered) will bring all necessary supplies. I put in referral to derm and lymphedema clinic.  Pt has seen derm in the past so it may be quicker for her to call and set up her own follow up apt rather than waiting for new referral.

## 2021-06-18 NOTE — TELEPHONE ENCOUNTER
LMTCB:    HUB: Can you please let patient know,  Patients home health orders are in, they will order her supplies, or calling wound care since she sees them for supplies if she needs them quicker, the derm referral is placed but she has seen them before so if she feels like calling them for an appointment it may get her in sooner.

## 2021-06-18 NOTE — TELEPHONE ENCOUNTER
Caller: Isha Llanes    Relationship: Self    Best call back number: [phone]     What is the best time to reach you: ANY    What was the call regarding: PATIENT CALLED TO SAY SHE IS STILL WAITING ON THE REFERRAL TO [facility] FOR HER LEGS.  SHE STATED SHE HASN'T HEARD ANYTHING YET, PLEASE ADVISE, THANK YOU.    Do you require a callback: YES

## 2021-06-18 NOTE — TELEPHONE ENCOUNTER
Patient is needing a referral to [facility] home health, she is also needing supplies for leg wrap, wound care wants her to go to lymphedema clinic and she wants to know if derm needs to be included also.  Can we place orders for all of this and supplies? Or does home health need to order once they are in?     Supplies needed: ace bandages, kerlex, and sterile water  Individuals bullets, 4x4

## 2021-06-21 ENCOUNTER — OFFICE VISIT (OUTPATIENT)
Dept: INTERNAL MEDICINE | Facility: CLINIC | Age: 56
End: 2021-06-21

## 2021-06-21 VITALS
RESPIRATION RATE: 15 BRPM | DIASTOLIC BLOOD PRESSURE: 70 MMHG | TEMPERATURE: 98.3 F | SYSTOLIC BLOOD PRESSURE: 128 MMHG | HEIGHT: 66 IN | HEART RATE: 108 BPM | WEIGHT: 167 LBS | BODY MASS INDEX: 26.84 KG/M2 | OXYGEN SATURATION: 96 %

## 2021-06-21 DIAGNOSIS — S81.809D MULTIPLE OPEN WOUNDS OF LOWER LEG, UNSPECIFIED LATERALITY, SUBSEQUENT ENCOUNTER: Primary | ICD-10-CM

## 2021-06-21 DIAGNOSIS — F17.219 CIGARETTE NICOTINE DEPENDENCE WITH NICOTINE-INDUCED DISORDER: ICD-10-CM

## 2021-06-21 DIAGNOSIS — R30.0 DYSURIA: ICD-10-CM

## 2021-06-21 DIAGNOSIS — I87.2 VENOUS STASIS DERMATITIS OF BOTH LOWER EXTREMITIES: ICD-10-CM

## 2021-06-21 DIAGNOSIS — I25.10 CORONARY ARTERY DISEASE WITHOUT ANGINA PECTORIS, UNSPECIFIED VESSEL OR LESION TYPE, UNSPECIFIED WHETHER NATIVE OR TRANSPLANTED HEART: ICD-10-CM

## 2021-06-21 DIAGNOSIS — F41.1 GENERALIZED ANXIETY DISORDER: ICD-10-CM

## 2021-06-21 PROBLEM — A41.9 SEPSIS: Status: ACTIVE | Noted: 2021-06-21

## 2021-06-21 PROBLEM — C80.1 CANCER: Status: RESOLVED | Noted: 2021-06-06 | Resolved: 2021-06-21

## 2021-06-21 PROBLEM — Z79.01 LONG TERM CURRENT USE OF ANTICOAGULANT THERAPY: Status: ACTIVE | Noted: 2021-06-21

## 2021-06-21 PROBLEM — Z85.71 HISTORY OF HODGKIN'S LYMPHOMA: Status: ACTIVE | Noted: 2020-01-16

## 2021-06-21 PROBLEM — R01.1 HEART MURMUR: Status: ACTIVE | Noted: 2020-01-16

## 2021-06-21 PROBLEM — T07.XXXA WOUNDS, MULTIPLE: Status: RESOLVED | Noted: 2021-06-06 | Resolved: 2021-06-21

## 2021-06-21 PROBLEM — C80.1 CANCER (HCC): Status: RESOLVED | Noted: 2021-06-06 | Resolved: 2021-06-21

## 2021-06-21 PROBLEM — L03.116 CELLULITIS OF LEFT LOWER LIMB: Status: ACTIVE | Noted: 2021-06-21

## 2021-06-21 PROBLEM — R60.0 PEDAL EDEMA: Status: RESOLVED | Noted: 2021-06-06 | Resolved: 2021-06-21

## 2021-06-21 PROBLEM — F17.200 NICOTINE DEPENDENCE: Status: ACTIVE | Noted: 2020-01-16

## 2021-06-21 PROBLEM — S81.802A MULTIPLE OPEN WOUNDS OF LEFT LOWER EXTREMITY: Status: RESOLVED | Noted: 2021-06-06 | Resolved: 2021-06-21

## 2021-06-21 PROBLEM — L97.921 ULCERS OF BOTH LOWER LEGS, LIMITED TO BREAKDOWN OF SKIN (HCC): Status: RESOLVED | Noted: 2021-06-15 | Resolved: 2021-06-21

## 2021-06-21 PROBLEM — A41.9 SEPSIS (HCC): Status: RESOLVED | Noted: 2021-06-21 | Resolved: 2021-06-21

## 2021-06-21 PROBLEM — L97.921 ULCERS OF BOTH LOWER LEGS, LIMITED TO BREAKDOWN OF SKIN: Status: RESOLVED | Noted: 2021-06-15 | Resolved: 2021-06-21

## 2021-06-21 PROBLEM — A41.9 SEPSIS: Status: RESOLVED | Noted: 2021-06-21 | Resolved: 2021-06-21

## 2021-06-21 PROBLEM — I34.1 MITRAL VALVE PROLAPSE: Status: ACTIVE | Noted: 2021-06-21

## 2021-06-21 PROBLEM — M32.9 LUPUS (SYSTEMIC LUPUS ERYTHEMATOSUS): Status: ACTIVE | Noted: 2021-06-21

## 2021-06-21 PROBLEM — L03.116 CELLULITIS OF LEFT LOWER LIMB: Status: RESOLVED | Noted: 2021-06-21 | Resolved: 2021-06-21

## 2021-06-21 PROBLEM — Z74.09 IMMOBILITY: Status: RESOLVED | Noted: 2018-11-21 | Resolved: 2021-06-21

## 2021-06-21 PROBLEM — C81.08 NODULAR LYMPHOCYTE PREDOMINANT HODGKIN LYMPHOMA OF LYMPH NODES OF MULTIPLE REGIONS: Status: RESOLVED | Noted: 2018-11-21 | Resolved: 2021-06-21

## 2021-06-21 PROBLEM — L97.911 ULCERS OF BOTH LOWER LEGS, LIMITED TO BREAKDOWN OF SKIN: Status: RESOLVED | Noted: 2021-06-15 | Resolved: 2021-06-21

## 2021-06-21 PROBLEM — M53.3 SACROILIAC JOINT DYSFUNCTION: Status: RESOLVED | Noted: 2017-09-28 | Resolved: 2021-06-21

## 2021-06-21 PROBLEM — K76.0 HEPATIC STEATOSIS: Status: ACTIVE | Noted: 2020-09-16

## 2021-06-21 PROBLEM — I50.43 ACUTE ON CHRONIC COMBINED SYSTOLIC (CONGESTIVE) AND DIASTOLIC (CONGESTIVE) HEART FAILURE: Status: RESOLVED | Noted: 2021-04-29 | Resolved: 2021-06-21

## 2021-06-21 LAB
BILIRUB UR QL STRIP: ABNORMAL
CLARITY UR: ABNORMAL
COLOR UR: ABNORMAL
GLUCOSE UR STRIP-MCNC: NEGATIVE MG/DL
HGB UR QL STRIP.AUTO: ABNORMAL
KETONES UR QL STRIP: NEGATIVE
LEUKOCYTE ESTERASE UR QL STRIP.AUTO: ABNORMAL
NITRITE UR QL STRIP: NEGATIVE
PH UR STRIP.AUTO: 6.5 [PH] (ref 5–8)
PROT UR QL STRIP: ABNORMAL
SP GR UR STRIP: >=1.03 (ref 1–1.03)
UROBILINOGEN UR QL STRIP: ABNORMAL

## 2021-06-21 PROCEDURE — 85007 BL SMEAR W/DIFF WBC COUNT: CPT | Performed by: PHYSICIAN ASSISTANT

## 2021-06-21 PROCEDURE — 87147 CULTURE TYPE IMMUNOLOGIC: CPT | Performed by: PHYSICIAN ASSISTANT

## 2021-06-21 PROCEDURE — 87205 SMEAR GRAM STAIN: CPT | Performed by: PHYSICIAN ASSISTANT

## 2021-06-21 PROCEDURE — 87070 CULTURE OTHR SPECIMN AEROBIC: CPT | Performed by: PHYSICIAN ASSISTANT

## 2021-06-21 PROCEDURE — 87086 URINE CULTURE/COLONY COUNT: CPT | Performed by: PHYSICIAN ASSISTANT

## 2021-06-21 PROCEDURE — 85025 COMPLETE CBC W/AUTO DIFF WBC: CPT | Performed by: PHYSICIAN ASSISTANT

## 2021-06-21 PROCEDURE — 87186 SC STD MICRODIL/AGAR DIL: CPT | Performed by: PHYSICIAN ASSISTANT

## 2021-06-21 PROCEDURE — 99214 OFFICE O/P EST MOD 30 MIN: CPT | Performed by: PHYSICIAN ASSISTANT

## 2021-06-21 PROCEDURE — 81003 URINALYSIS AUTO W/O SCOPE: CPT | Performed by: PHYSICIAN ASSISTANT

## 2021-06-21 RX ORDER — FUROSEMIDE 40 MG/1
40 TABLET ORAL DAILY
COMMUNITY
End: 2021-08-26 | Stop reason: SDUPTHER

## 2021-06-21 NOTE — PROGRESS NOTES
Transitional Care Follow Up Visit  Subjective     Isha Llanes is a 55 y.o. female who presents for a transitional care management visit.    Within 48 business hours after discharge our office contacted her via telephone to coordinate her care and needs.      I reviewed and discussed the details of that call along with the discharge summary, hospital problems, inpatient lab results, inpatient diagnostic studies, and consultation reports with Isha.     Current outpatient and discharge medications have been reconciled for the patient.  Reviewed by: Damari Cornejo PA-C      Date of TCM Phone Call 6/7/2021   Hospital Go   Date of Admission 6/6/2021   Date of Discharge 6/7/2021   [place] [facility]     Risk for Readmission (LACE) Score: 10 (6/7/2021  6:01 AM)      History of Present Illness   Course During Hospital Stay:  Pt admitted for multiple wounds of LLE, dm, and chronic pain. Pain in legs was so severe causing her the need to be admitted. She was started on abx and discharged home on Clindamycin. She was set up with home health wound care. Pt was not interested in SNF.    Pt states she took clindamycin x 3 days and d/cdue to ''abdominal discomfort''. She states she ''just wants something to heal the wounds'' but is unable to tolerate most antibiotics. She would like a referral to vascular surgery to discuss her leg tx options. Pt states she would like to have legs amputated if they are going to continue to leak fluid. She would like legs to be tested for infection today and opened a lesion in the office to get drainage. Legs draining clear/yellow fluid. She has been keeping legs wrapped at home. She states she has not heard from home health yet.  Denies fever, chills.  She is not able to sleep due to pain in her legs.    Pt states she was seen by psych and given hydroxazine but it ''does not help''. She does not want to try ssri or other daily medicine due to allergies or  not working in the past. Denies si/hi. The only thing that helped in the past is xanax.    Pt c/o dysuria. Denies incontinence.      Objective   Physical Exam  Vitals reviewed.   Constitutional:       Appearance: Normal appearance.   HENT:      Head: Normocephalic and atraumatic.      Nose: Nose normal.      Mouth/Throat:      Mouth: Mucous membranes are moist.   Eyes:      Extraocular Movements: Extraocular movements intact.      Conjunctiva/sclera: Conjunctivae normal.      Pupils: Pupils are equal, round, and reactive to light.   Cardiovascular:      Rate and Rhythm: Normal rate and regular rhythm.   Pulmonary:      Effort: Pulmonary effort is normal.      Breath sounds: Normal breath sounds.   Abdominal:      General: Abdomen is flat. Bowel sounds are normal.      Palpations: Abdomen is soft.   Musculoskeletal:         General: Normal range of motion.   Neurological:      General: No focal deficit present.      Mental Status: She is alert and oriented to person, place, and time.      Gait: Gait abnormal (in wheelchair due to weakness/pain).   Psychiatric:         Mood and Affect: Mood normal.         Assessment/Plan   Diagnoses and all orders for this visit:    1. Multiple open wounds of lower leg, unspecified laterality, subsequent encounter (Primary)  Comments:  Reviewed discharge summary. Discussed venous statsis and recurrent infection. Discussed medication noncompliance with abx tx. Legs do not appear infected today.   Will get CBC and wound culture to evaluate.  Discussed need to go to er if fever develops or sx worsens. Discussed that if infection returns, pt will need admission to treat since unable to tolerate and comply with outpt antibiotics. Discussed option of hospice if pt is not going to be compliant with tx. Referral placed for vascular second opinion. Discouraged amputation until absolutely necessary due to pt slow wound healing due to CVD, DM, HTN. Referral already placed for lymphedema clinic  and home health. Encouraged compliance with wound care at home.  Orders:  -     Wound Culture - Wound, Leg, Right  -     CBC w AUTO Differential; Future  -     CBC w AUTO Differential    2. Dysuria  Comments:  Will wait for culture to determine need to tx for infection or not. Increase water intake.  Orders:  -     Urinalysis With Culture If Indicated - Urine, Clean Catch  -     Urinalysis, Microscopic Only - Urine, Clean Catch  -     Urine Culture - Urine, Urine, Clean Catch; Future  -     Urine Culture - Urine, Urine, Clean Catch    3. Cigarette nicotine dependence with nicotine-induced disorder  Comments:  Encouraged smoking cessation, pt declined.    4. Generalized anxiety disorder  Comments:  PT declines tx. Encouraged to follow back up with psych to discuss medications.    5. Venous stasis dermatitis of both lower extremities  -     Wound Culture - Wound, Leg, Right  -     CBC w AUTO Differential; Future  -     CBC w AUTO Differential    6. Coronary artery disease without angina pectoris, unspecified vessel or lesion type, unspecified whether native or transplanted heart         Follow up in 1 month.

## 2021-06-22 ENCOUNTER — READMISSION MANAGEMENT (OUTPATIENT)
Dept: CALL CENTER | Facility: HOSPITAL | Age: 56
End: 2021-06-22

## 2021-06-22 LAB
BACTERIA SPEC AEROBE CULT: ABNORMAL
DEPRECATED RDW RBC AUTO: 40.5 FL (ref 37–54)
EOSINOPHIL # BLD MANUAL: 0.09 10*3/MM3 (ref 0–0.4)
EOSINOPHIL NFR BLD MANUAL: 2 % (ref 0.3–6.2)
ERYTHROCYTE [DISTWIDTH] IN BLOOD BY AUTOMATED COUNT: 13.5 % (ref 12.3–15.4)
HCT VFR BLD AUTO: 37.8 % (ref 34–46.6)
HGB BLD-MCNC: 12.3 G/DL (ref 12–15.9)
LYMPHOCYTES # BLD MANUAL: 0.88 10*3/MM3 (ref 0.7–3.1)
LYMPHOCYTES NFR BLD MANUAL: 10 % (ref 5–12)
LYMPHOCYTES NFR BLD MANUAL: 20 % (ref 19.6–45.3)
MCH RBC QN AUTO: 27 PG (ref 26.6–33)
MCHC RBC AUTO-ENTMCNC: 32.5 G/DL (ref 31.5–35.7)
MCV RBC AUTO: 82.9 FL (ref 79–97)
MONOCYTES # BLD AUTO: 0.44 10*3/MM3 (ref 0.1–0.9)
NEUTROPHILS # BLD AUTO: 2.98 10*3/MM3 (ref 1.7–7)
NEUTROPHILS NFR BLD MANUAL: 68 % (ref 42.7–76)
PLAT MORPH BLD: NORMAL
PLATELET # BLD AUTO: 177 10*3/MM3 (ref 140–450)
PMV BLD AUTO: 11.2 FL (ref 6–12)
RBC # BLD AUTO: 4.56 10*6/MM3 (ref 3.77–5.28)
RBC MORPH BLD: NORMAL
STREP GROUPING: ABNORMAL
WBC # BLD AUTO: 4.38 10*3/MM3 (ref 3.4–10.8)
WBC MORPH BLD: NORMAL

## 2021-06-22 NOTE — OUTREACH NOTE
Medical Week 3 Survey      Responses   [facility] patient discharged from?  Go   Does the patient have one of the following disease processes/diagnoses(primary or secondary)?  Other   Week 3 attempt successful?  No   Unsuccessful attempts  Attempt 1          Dionne Mccann RN

## 2021-06-23 ENCOUNTER — READMISSION MANAGEMENT (OUTPATIENT)
Dept: CALL CENTER | Facility: HOSPITAL | Age: 56
End: 2021-06-23

## 2021-06-23 ENCOUNTER — TELEPHONE (OUTPATIENT)
Dept: INTERNAL MEDICINE | Facility: CLINIC | Age: 56
End: 2021-06-23

## 2021-06-23 NOTE — TELEPHONE ENCOUNTER
I called and spoke with pt earlier- pt needs to go to ER for treatment of staph infection on lower legs (which she was recently admitted for) and strep infection in urine. Please call and tell her my rec is to go to the ER like we discussed. She mentioned not having transportation, if this is the case she needs to call 911

## 2021-06-23 NOTE — TELEPHONE ENCOUNTER
Caller: WalterSheila    Relationship: Emergency Contact    Best call back number: [phone]     What orders are you requesting (i.e. lab or imaging): REFERRAL TO INFUSION COMPANY THAT IS APPROPRIATE WITH INSURANCE.    PATIENTS DAUGHTER STATED THAT SHE HAS SEVERE ANXIETY AND PANIC ATTACKS WHEN IN THE HOSPITAL. PATIENT WANTS TO RECEIVE IV TREATMENTS AND ANTIBIOTIC INFUSION AT HOME FOR STAPH INFECTION AND STREP.    Where will you receive your lab/imaging services: WHEREVER DOCTOR RECOMMENDS THAT IS APPROVED BY INSURANCE.      Additional notes: PATIENT IS REQUESTING THAT THEY RECEIVE A CALLBACK ON NEXT STEPS OF CARE / REFERRAL UPDATE

## 2021-06-23 NOTE — TELEPHONE ENCOUNTER
Caller: Isha Llanes    Relationship: Self    Best call back number: [phone]     What is the best time to reach you: ANY TIME     Who are you requesting to speak with (clinical staff, provider,  specific staff member): JENIFER VOSS    Do you know the name of the person who called: ISHA LLANES    What was the call regarding: PATIENT STATES THAT SHE DOES NOT WANT TO GO TO THE ER BUT NEEDS TO SPEAK TO A PROVIDER. SHE STATED THAT SHE HAS STREP IN HER LUNGS AND STAPH IN HER THROAT.    Do you require a callback: YES

## 2021-06-23 NOTE — OUTREACH NOTE
Medical Week 3 Survey      Responses   [facility] patient discharged from?  Go   Does the patient have one of the following disease processes/diagnoses(primary or secondary)?  Other   Week 3 attempt successful?  No   Unsuccessful attempts  Attempt 1          Emily Birmingham RN

## 2021-06-24 ENCOUNTER — READMISSION MANAGEMENT (OUTPATIENT)
Dept: CALL CENTER | Facility: HOSPITAL | Age: 56
End: 2021-06-24

## 2021-06-24 LAB
BACTERIA SPEC AEROBE CULT: ABNORMAL
GRAM STN SPEC: ABNORMAL

## 2021-06-24 NOTE — TELEPHONE ENCOUNTER
Caller: Sheila Watson    Relationship: Emergency Contact    Best call back number:[phone]    What is the best time to reach you: ANY     Who are you requesting to speak with (clinical staff, provider,  specific staff member): CLINICAL         What was the call regarding: REFERRAL FOR INFUSION COMPANY  DAUGHTER STATES THAT NO ONE HAS CONTACTED HER REGARDING THIS REFERRAL     Do you require a callback:YES    HUB WAS UNABLE TO WARM TRANSFER.    DAUGHTER IS AWARE THAT SHE IS NOT ON THE  VERBAL.

## 2021-06-24 NOTE — TELEPHONE ENCOUNTER
Called patient and discussed they needed to go to the ED to start treatment for staph and strep. She said she talked with another dr and they said she could be treated outpatient IV at home. Discussed with patient that the other provider may not know full history and what all is going on but that per our provider she needs to go to the ED to prevent further complications.  Patient stated understanding. Says she doesn't want to go there and will talk with her family and then decide. No other issues or concerns noted currently per patient.

## 2021-06-24 NOTE — TELEPHONE ENCOUNTER
I spoke with pt 6/24 and explained to her in detail that she is not a candidate for home IV therapy due to numerous allergies and both strep and staph infection. She needs to immediately report to the ER for inpatient treatment of infection. When I spoke with her I also discussed her extreme risk for sepsis due to recent hospitalization for sepsis infection.

## 2021-06-24 NOTE — OUTREACH NOTE
Medical Week 3 Survey      Responses   [facility] patient discharged from?  Go   Does the patient have one of the following disease processes/diagnoses(primary or secondary)?  Other   Week 3 attempt successful?  Yes   Call start time  1358   Call end time  1412   Discharge diagnosis  Multiple open wounds of LLE, DM, chronic pain with sciatica   Meds reviewed with patient/caregiver?  Yes   Is the patient taking all medications as directed (includes completed medication regime)?  Yes   Medication comments  Patient is independently taking an old prescription of Linezolid--will alert PCP   Does the patient have a primary care provider?   Yes   Comments regarding PCP  Verified that patient completed PCP f/u on 6/21/21   Has the patient kept scheduled appointments due by today?  Yes   Home health comments  Patient communicated she has never rec'd a call from  agency,  noted that PCP is working through some issues and advised patient to return to ED for admission.  Patient wants IV antibiotics but PCP feels she needs inpatient admission.   Did the patient receive a copy of their discharge instructions?  Yes   Nursing interventions  Reviewed instructions with patient   What is the patient's perception of their health status since discharge?  Worsening [Patient w/ positive wound cultures and PCP has requested her return to ED but patient refusing despite PCP encouragement.  This Rn also discussed but patient continues to refuse.]   Is the patient/caregiver able to teach back signs and symptoms related to disease process for when to call PCP?  Yes   Is the patient/caregiver able to teach back signs and symptoms related to disease process for when to call 911?  Yes   Is the patient/caregiver able to teach back the hierarchy of who to call/visit for symptoms/problems? PCP, Specialist, Home health nurse, Urgent Care, ED, 911  Yes   Additional teach back comments  Enc'd compliance but patient continues to refuse to  return to hospital for treatment at this time.    Week 3 Call Completed?  Yes          Dionne Mccann RN

## 2021-06-25 ENCOUNTER — TELEPHONE (OUTPATIENT)
Dept: WOUND CARE | Facility: HOSPITAL | Age: 56
End: 2021-06-25

## 2021-06-25 NOTE — TELEPHONE ENCOUNTER
RECEIVED CALL TO LET US KNOW THAT CULTURES HAD BEEN OBTAINED OF PATIENT'S WOUND AND URINE. BOTH WERE POSITIVE FOR BACTERIAL GROWTH AND IT WAS THEIR RECOMMENDATION THAT THE PATIENT GO TO THE HOSPITAL TO BE SEEN AND TREATED WITH IV ANTIBIOTIC THERAPY AND MONITORED DUE TO PATIENT'S EXTENSIVE DRUG ALLERGIES. PATIENT HAD REPORTED TO NOT WANTING TO GO TO THE HOSPITAL. TOMAS CALLED TO ADVISE US OF THIS AND TO GET OUR RECOMMENDATIONS AS WELL. I ADVISED TOMAS THAT WE AGREED THAT PATIENT WOULD BENEFIT FROM GOING TO THE HOSPITAL FOR IV ANTIBIOTIC TREATMENT, BUT IF PATIENT REFUSED THAT WE WILL CONTINUE TO MONITOR HER WOUND CLOSELY AT OUR CLINIC.

## 2021-06-28 ENCOUNTER — TELEPHONE (OUTPATIENT)
Dept: INTERNAL MEDICINE | Facility: CLINIC | Age: 56
End: 2021-06-28

## 2021-06-28 RX ORDER — LINEZOLID 600 MG/1
600 TABLET, FILM COATED ORAL 2 TIMES DAILY
Qty: 28 TABLET | Refills: 0 | Status: SHIPPED | OUTPATIENT
Start: 2021-06-28 | End: 2021-07-12

## 2021-06-28 NOTE — TELEPHONE ENCOUNTER
Caller: Isha Llanes    Relationship: Self    Best call back number: [phone]     What medication are you requesting: LINEVOILD 600MG    What are your current symptoms: STAPH INFECTION IN LEGS      If a prescription is needed, what is your preferred pharmacy and phone number: [facility] - [place], [place] - [address] [facility] - [phone]  - [phone] FX     Additional notes: PATIENT STATED THAT THIS MEDICATION HAS HELPED CLEAR UP THE SORES FROM HER INFECTION ON HER LEGS AND IS REQUESTING MORE BE SENT IN BECAUSE SHE IS ALMOST OUT

## 2021-06-28 NOTE — TELEPHONE ENCOUNTER
Staph infection from leg ulcers and strep infection from urine culture were not tested for susceptibility to Linezolid. I highly recommend patient go to ER for admission and IV antibiotic treatment. We have recommended ER numerous times. The pt is high risk for severe infection and sepsis due to repeat admission for sepsis. She has 21 documented allergies in her chart which unfortunately include the medications that are susceptible to the infections noted on both cultures. I have sent Linezolid to the pharmacy if patient still refusing to go to the ER, but the ER is most appropriate location for her care of both infections.

## 2021-07-01 ENCOUNTER — READMISSION MANAGEMENT (OUTPATIENT)
Dept: CALL CENTER | Facility: HOSPITAL | Age: 56
End: 2021-07-01

## 2021-07-01 ENCOUNTER — TELEPHONE (OUTPATIENT)
Dept: INTERNAL MEDICINE | Facility: CLINIC | Age: 56
End: 2021-07-01

## 2021-07-01 NOTE — TELEPHONE ENCOUNTER
PATIENT WAS ADVISED BY PHARMACY THAT SCRIPT linezolid (ZYVOX) 600 MG tablet IS NEEDING A PRE AUTHORIZATION.     PATIENT IS ALSO REQUESTING A CALL BACK FROM CLINICAL REGARDING HER LEGS.     PATIENT'S CALL BACK:  [phone]

## 2021-07-01 NOTE — OUTREACH NOTE
Medical Week 4 Survey      Responses   [facility], [place], operated by [facility] patient discharged from?  Go   Does the patient have one of the following disease processes/diagnoses(primary or secondary)?  Other   Week 4 attempt successful?  Yes   Call start time  1343   Call end time  1352   Discharge diagnosis  Multiple open wounds of LLE, DM, chronic pain with sciatica   Person spoke with today (if not patient) and relationship  Pt and daughter- Sheila   Medication alerts for this patient  Zyvox needing PA- PCP office aware.    Meds reviewed with patient/caregiver?  Yes   Is the patient taking all medications as directed (includes completed medication regime)?  No [PA needed for Zyvox. PCP aware at this time. Pt was instructed  to go to the hospital r/t culture results for IV ABT, however Pt refusing. ]   Nursing Interventions  Nurse provided patient education [Educated Pt again as to reasons PCP instructed PT to report to the Hospital for IV ABT. Pt refuses. ]   Has the patient kept scheduled appointments due by today?  No [Pt had appt for 6/30/21 with PCP, however canceled r/t Transportation. New appt 7/2/21. Encouraged Pt to keep appt.]   What is preventing the patient from keeping their appointments?  Transportation   Nursing Interventions  Advised patient to keep appointment, Educated on importance of keeping appointment   Is the patient still receiving Home Health Services?  No   Home health comments  Pt states that Home Health has not called and that PCP is aware. PCP has instructed Pt to go to the Hospital for IV ABT and Treatment , however Pt refuses. Pt also reports that she does not have any more clean dressings to dress her wounds. She reports PCP office is also aware. Review of chart shows communication today at noon. Pt has an appt tomorrow with PCP. Encouraged Pt to keep the appt and discuss with PCP.    Psychosocial issues?  No   Comments  Reviewed s/s of Sepsis with Pt and importance of treatment. Pt V/U.   What is  "the patient's perception of their health status since discharge?  Improving [Pt states that her wounds are \"looking better\". ]   Is the patient/caregiver able to teach back signs and symptoms related to disease process for when to call PCP?  Yes   Is the patient/caregiver able to teach back signs and symptoms related to disease process for when to call 911?  Yes   Is the patient/caregiver able to teach back the hierarchy of who to call/visit for symptoms/problems? PCP, Specialist, Home health nurse, Urgent Care, ED, 911  Yes   Additional teach back comments  Pt refuses to return to the hospital at this time per PCP instructions.    Week 4 Call Completed?  Yes   Would the patient like one additional call?  No   Wrap up additional comments  Pt states that she does not wish for another call.           Emily Birmingham RN  "

## 2021-07-02 ENCOUNTER — OFFICE VISIT (OUTPATIENT)
Dept: INTERNAL MEDICINE | Facility: CLINIC | Age: 56
End: 2021-07-02

## 2021-07-02 VITALS
BODY MASS INDEX: 26.84 KG/M2 | WEIGHT: 167 LBS | DIASTOLIC BLOOD PRESSURE: 82 MMHG | HEART RATE: 92 BPM | SYSTOLIC BLOOD PRESSURE: 124 MMHG | OXYGEN SATURATION: 95 % | TEMPERATURE: 97.9 F | HEIGHT: 66 IN

## 2021-07-02 DIAGNOSIS — A49.01 MSSA (METHICILLIN SUSCEPTIBLE STAPHYLOCOCCUS AUREUS) INFECTION: Primary | ICD-10-CM

## 2021-07-02 DIAGNOSIS — S81.809D NON-HEALING WOUND OF LOWER EXTREMITY, SUBSEQUENT ENCOUNTER: ICD-10-CM

## 2021-07-02 DIAGNOSIS — L97.901 ULCER OF LOWER EXTREMITY, LIMITED TO BREAKDOWN OF SKIN, UNSPECIFIED LATERALITY (HCC): Primary | ICD-10-CM

## 2021-07-02 PROCEDURE — 99215 OFFICE O/P EST HI 40 MIN: CPT | Performed by: INTERNAL MEDICINE

## 2021-07-02 NOTE — TELEPHONE ENCOUNTER
Pa still pending. Tried calling [employer] and was directed to 4 different people and still could not reach someone to verify status.

## 2021-07-07 ENCOUNTER — PATIENT OUTREACH (OUTPATIENT)
Dept: CASE MANAGEMENT | Facility: OTHER | Age: 56
End: 2021-07-07

## 2021-07-07 ENCOUNTER — REFERRAL TRIAGE (OUTPATIENT)
Dept: CASE MANAGEMENT | Facility: OTHER | Age: 56
End: 2021-07-07

## 2021-07-07 DIAGNOSIS — L97.929 ULCERS OF BOTH LOWER LEGS (HCC): ICD-10-CM

## 2021-07-07 DIAGNOSIS — L97.919 ULCERS OF BOTH LOWER LEGS (HCC): ICD-10-CM

## 2021-07-07 DIAGNOSIS — Z91.199 NONCOMPLIANCE: Primary | ICD-10-CM

## 2021-07-07 NOTE — OUTREACH NOTE
ASSESSMENT    Staff Spoke With: MSW spoke with PCP regarding referral for med assistance and setting up Infectious Disease appointment. MSW spoke patient in office after PCP appointment. Patient's main need is assistance with the cost of Linezolid and other community resources as needed.    Reason for the Referral: Additional Care Services, Financial Concerns and Transportation Needs    Patient's Reported Cognitive Status: Alert and Oriented    Does the patient have Advanced Care Planning documents?: No.    Patient's Reported Physical Status: Needs Assistance    Patient utilizes these assistive devices and/or in home care services: Wheelchair    Patient's  Status: Did not serve in the .    Patient's Current Living Arrangements/Home Environment: Patient lives with daughter.     Patient's Spiritual Affiliation/Impact on Care: No spiritual affiliation noted.    Patient's Behavioral Health/Substance Abuse History:  No substance abuse concerns noted.     SDOH updated and reviewed with the patient during this program:     Financial Resource Strain: Medium Risk   • Difficulty of Paying Living Expenses: Somewhat hard       Food Insecurity: No Food Insecurity   • Worried About Running Out of Food in the Last Year: Never true   • Ran Out of Food in the Last Year: Never true       Transportation Needs: Unmet Transportation Needs   • Lack of Transportation (Medical): Yes   • Lack of Transportation (Non-Medical): Yes       Housing Stability: Low Risk    • Unable to Pay for Housing in the Last Year: No   • Number of Places Lived in the Last Year: 1   • Unstable Housing in the Last Year: No       Patient Outreach    MSW spoke with patient about resources needed at this time. Patient needs assistance with Linezolid medication. MSW obtained signatures while patient was in office for prescription assistance program through [employer]. Patient stated that she needs assistance with     Care Coordination    MSW faxed  application to PAP through [employer]. MSW received phone call that patient has been approved for this medication and it would be delivered on 7/7/2021.     DISCUSSION AND PLAN    MSW to follow up with patient regarding appointments for specialists and regarding med assistance programs.     Verbal consent obtained to contact/refer to the following individuals and/or agencies: [employer] Prescription Assistance Program and [employer]      SHAWN Hartman   Ambulatory Case Management    7/7/2021 10:39 EDT

## 2021-07-08 ENCOUNTER — TELEPHONE (OUTPATIENT)
Dept: INTERNAL MEDICINE | Facility: CLINIC | Age: 56
End: 2021-07-08

## 2021-07-08 DIAGNOSIS — R53.1 WEAKNESS: Primary | ICD-10-CM

## 2021-07-08 NOTE — TELEPHONE ENCOUNTER
Patient called and said the wheelchair script we had been working on needs to say Power wheelchair repair or replacement and then faxed to [phone] Attn: Mich per patient.  Please advise.

## 2021-07-08 NOTE — TELEPHONE ENCOUNTER
Caller: Isha Llanes    Relationship: Self    Best call back number: [phone]     What is the best time to reach you: ANYTIME     Who are you requesting to speak with (clinical staff, provider,  specific staff member): CLINICAL STAFF     Do you know the name of the person who called: ISHA LLANES     What was the call regarding: PATIENT IS NEEDING HIS WHEELCHAIR REPAIR. PLEASE CALL AND ADVISE.    Do you require a callback: YES

## 2021-07-12 ENCOUNTER — TELEPHONE (OUTPATIENT)
Dept: INTERNAL MEDICINE | Facility: CLINIC | Age: 56
End: 2021-07-12

## 2021-07-12 ENCOUNTER — PATIENT OUTREACH (OUTPATIENT)
Dept: CASE MANAGEMENT | Facility: OTHER | Age: 56
End: 2021-07-12

## 2021-07-12 NOTE — TELEPHONE ENCOUNTER
Caller: Isha Llanes    Relationship: Self    Best call back number: [phone]    What is the medical concern/diagnosis: N/A    What specialty or service is being requested: N/A    What is the provider, practice or medical service name: N/A    What is the office location: N/A    What is the office phone number: N/A    Any additional details: THE PATIENT STATED SHE WOULD LIKE TO CHECK THE STATUS OF HER REFERRAL. SHE WOULD LIKE A CALL BACK ASAP TO DISCUSS.

## 2021-07-12 NOTE — OUTREACH NOTE
Patient Outreach    MSW spoke with patient's daughter on this day for follow up regarding upcoming speciality appointments. MSW spoke with daughter about patient's appt with Infectious Disease on 7/15 in [place] at 1:40pm. Daughter states that she will get patient to appointment. MSW also provided information for [employer] services and informed daughter that she would need to call today if they wanted transportation for 7/15/21. Patient's daughter also given information for appointment on 7/14 for Gastro. No other needs at this time. MSW expressed importance of following up with appointments for patient's own health and welfare. MSW to monitor and follow up with patient following week to ensure she went to appointments.     Care Coordination    MSW spoke Monalisa at [facility] to confirm she was able to reach patient.      SHAWN Hartman   , Ambulatory Case Management    7/12/2021 10:58 EDT

## 2021-07-12 NOTE — TELEPHONE ENCOUNTER
"  Caller: Isha Llanes    Relationship: Self    Best call back number: [phone]    What is the best time to reach you: ANYTIME    Who are you requesting to speak with (clinical staff, provider,  specific staff member): CLINICAL      What was the call regarding: THE PATIENT STATED THE PRESCRIPTION MUST STATE \"FOR POWER CHAIR REPAIR OR REPLACEMENT\". SHE NEEDS THIS PRESCRIPTION FAXED ASAP. SHE STATED SHE CANNOT GET AROUND AND HAS NO WAY TO BUY ANOTHER ONE. SHE ALSO STATED THE ANTIBIOTICS ARE UPSETTING HER STOMACH. THE PATIENT WOULD LIKE A CALL BACK WHEN THIS IS DONE.    Do you require a callback: YES          "

## 2021-07-13 ENCOUNTER — TELEPHONE (OUTPATIENT)
Dept: GASTROENTEROLOGY | Facility: CLINIC | Age: 56
End: 2021-07-13

## 2021-07-13 NOTE — TELEPHONE ENCOUNTER
Patient was referred by Dr. Gaming for iron def. Called office twice today to request lab results and the mailbox was full.

## 2021-07-14 ENCOUNTER — OFFICE VISIT (OUTPATIENT)
Dept: GASTROENTEROLOGY | Facility: CLINIC | Age: 56
End: 2021-07-14

## 2021-07-14 VITALS
HEIGHT: 67 IN | SYSTOLIC BLOOD PRESSURE: 99 MMHG | WEIGHT: 166 LBS | BODY MASS INDEX: 26.06 KG/M2 | DIASTOLIC BLOOD PRESSURE: 62 MMHG

## 2021-07-14 DIAGNOSIS — R11.0 NAUSEA: ICD-10-CM

## 2021-07-14 DIAGNOSIS — R19.7 DIARRHEA, UNSPECIFIED TYPE: ICD-10-CM

## 2021-07-14 DIAGNOSIS — Z85.71 HISTORY OF HODGKIN'S LYMPHOMA: ICD-10-CM

## 2021-07-14 DIAGNOSIS — K21.9 GASTROESOPHAGEAL REFLUX DISEASE, UNSPECIFIED WHETHER ESOPHAGITIS PRESENT: Primary | ICD-10-CM

## 2021-07-14 DIAGNOSIS — K76.0 HEPATIC STEATOSIS: ICD-10-CM

## 2021-07-14 DIAGNOSIS — R19.4 CHANGE IN BOWEL HABITS: ICD-10-CM

## 2021-07-14 DIAGNOSIS — Z01.818 PRE-OP TESTING: ICD-10-CM

## 2021-07-14 PROCEDURE — 99204 OFFICE O/P NEW MOD 45 MIN: CPT | Performed by: NURSE PRACTITIONER

## 2021-07-14 RX ORDER — HYDROMORPHONE HYDROCHLORIDE 4 MG/1
TABLET ORAL
COMMUNITY
End: 2021-07-14 | Stop reason: SDUPTHER

## 2021-07-14 RX ORDER — HYDROXYZINE 50 MG/1
50 TABLET, FILM COATED ORAL EVERY 6 HOURS PRN
COMMUNITY
Start: 2021-06-29 | End: 2022-09-29

## 2021-07-14 RX ORDER — CLOPIDOGREL BISULFATE 75 MG/1
75 TABLET ORAL DAILY
COMMUNITY
Start: 2021-05-01 | End: 2021-09-29 | Stop reason: SDUPTHER

## 2021-07-14 RX ORDER — IPRATROPIUM BROMIDE AND ALBUTEROL SULFATE 2.5; .5 MG/3ML; MG/3ML
1 SOLUTION RESPIRATORY (INHALATION)
COMMUNITY
Start: 2021-04-07 | End: 2023-01-12 | Stop reason: SDUPTHER

## 2021-07-14 RX ORDER — ALBUTEROL SULFATE 90 UG/1
1 AEROSOL, METERED RESPIRATORY (INHALATION) EVERY 4 HOURS PRN
COMMUNITY
End: 2022-01-13 | Stop reason: SDUPTHER

## 2021-07-14 RX ORDER — ACETAMINOPHEN 325 MG/1
325 TABLET ORAL
COMMUNITY
End: 2021-07-14 | Stop reason: SDUPTHER

## 2021-07-14 RX ORDER — GABAPENTIN 300 MG/1
CAPSULE ORAL
COMMUNITY
End: 2021-07-14 | Stop reason: SDUPTHER

## 2021-07-14 RX ORDER — SOD SULF/POT CHLORIDE/MAG SULF 1.479 G
188 TABLET ORAL CONTINUOUS
Qty: 24 TABLET | Refills: 0 | Status: ON HOLD | OUTPATIENT
Start: 2021-07-14 | End: 2021-12-29

## 2021-07-14 NOTE — PROGRESS NOTES
Chief Complaint  Anemia and Hiatal Hernia    Isha Llanes is a 55 y.o. female who presents to [facility] GASTRO[facility]OLOGY on referral from Jl Gaming MD for a gastroenterology evaluation of abdominal pain.  History of Present Illness  Admits a history of hodkin's lymphoma, that's been in remission for the past 3 years.  Referred here per Dr. Gaming for EGD/Colonosocpy.  Last scopes were 10-15 years ago.      She reports a daily bowel movement, but describes stool to range from hard stool to liquid stool.  Denies  Hematochezia and melena.  Reports that she often doesn't feel like she's able to have a complete bowel movement.  She occasionally uses enemas due to feeling of incomplete evacuation.      Admits right lower quedrant pain that's worse in the mornings.   She is unable to lie down due to a hip issue.  She's in a wheelchair and sits often.      Admits epigastric pain that's worse when eating.  States that she will often have to regurgitate meat due dysphagia.  Reports that she's tried several PPIs in the past and pepcid with no relief of heartburn.  She is now taking TUMS frequently.          Past Medical History:   Diagnosis Date   • Anxiety     NO CURRENT MEDICATION   • Aortic stenosis, severe    • Arthritis    • Asthma    • Back pain    • Blood clotting disorder (CMS/HCC)    • Cancer associated pain    • Chronic low back pain with sciatica    • Diabetes mellitus (CMS/HCC)     TYPE 2   • Dyspnea on effort    • GERD (gastroesophageal reflux disease)    • Hiatal hernia    • History of degenerative disc disease    • History of kidney stones    • History of pulmonary embolus (PE)    • History of transfusion    • Hodgkin's lymphoma (CMS/HCC)    • Immobility    • Lupus (CMS/HCC)    • Mitral valve prolapse    • Pelvic pain    • Peripheral neuropathy    • Personal history of DVT (deep vein thrombosis)    • Presence of intrathecal pump    • Sacroiliac joint disease    • SI (sacroiliac)  joint inflammation (CMS/HCC)    • Venous insufficiency        Past Surgical History:   Procedure Laterality Date   • BACK SURGERY      SPINAL FUSION    • BLADDER REPAIR     • CARDIAC CATHETERIZATION N/A 3/6/2019    Procedure: Valvuloplasty;  Surgeon: Wayne Johnson MD;  Location: [facility] INVASIVE LOCATION;  Service: Cardiology   • CARDIAC VALVE REPLACEMENT  2021   • CHOLECYSTECTOMY     • HYSTERECTOMY     • LYMPHADENECTOMY     • PACEMAKER IMPLANTATION     • PAIN PUMP INSERTION/REVISION N/A 10/18/2019    Procedure: PAIN PUMP INSERTION [facility] 10-18-19 [place];  Surgeon: Horace Rios MD;  Location: [facility] MAIN OR;  Service: Pain Management   • PAIN PUMP INSERTION/REVISION N/A 11/23/2020    Procedure: pain pump removal;  Surgeon: Horace Rios MD;  Location: [facility] OR;  Service: Pain Management;  Laterality: N/A;   • TONSILLECTOMY     • TUBAL ABDOMINAL LIGATION     • TUMOR REMOVAL           Current Outpatient Medications:   •  clopidogrel (Plavix) 75 MG tablet, Plavix 75 mg oral tablet take 1 tablet (75 mg) by oral route once daily   Active, Disp: , Rfl:   •  ipratropium-albuterol (DUO-NEB) 0.5-2.5 mg/3 ml nebulizer, Inhale 1 ampule., Disp: , Rfl:   •  Accu-Chek Guide test strip, USE AS DIRECTED TO test blood sugars FOUR TIMES DAILY, Disp: , Rfl:   •  acetaminophen (TYLENOL) 500 MG tablet, Take 1,000-1,500 mg by mouth Every 6 (Six) Hours As Needed for Mild Pain ., Disp: , Rfl:   •  albuterol (PROVENTIL HFA;VENTOLIN HFA) 108 (90 Base) MCG/ACT inhaler, Inhale 2 puffs Every 4 (Four) Hours As Needed for Wheezing., Disp: , Rfl:   •  albuterol sulfate  (90 Base) MCG/ACT inhaler, albuterol sulfate 90 mcg/actuation inhalation HFA aerosol inhaler inhale 1 - 2 puffs by inhalation route every 4 hours as needed   Suspended, Disp: , Rfl:   •  furosemide (LASIX) 40 MG tablet, Take 40 mg by mouth Daily., Disp: , Rfl:   •  gabapentin (NEURONTIN) 300 MG capsule, TAKE ONE CAPSULE BY MOUTH THREE TIMES  DAILY, Disp: 270 capsule, Rfl: 1  •  HYDROmorphone (DILAUDID) 1 mg/mL solution, Infuse  into a venous catheter Dose Adjusted By Provider As Needed. PER PAIN PUMP, Disp: , Rfl:   •  hydrOXYzine (ATARAX) 50 MG tablet, Take 50 mg by mouth 4 (Four) Times a Day., Disp: , Rfl:   •  Insulin Lispro (ADMELOG SOLOSTAR) 100 UNIT/ML injection pen, Inject 12 Units under the skin into the appropriate area as directed. SLIDING SCALE R/T BS, Disp: , Rfl:   •  sodium chloride 0.9 % solution with HYDROmorphone (PF) 50 MG/5ML solution 0.2 mg/mL, Infuse 0-2 mg into a venous catheter Every 1 (One) Hour As Needed for Pain., Disp: , Rfl:      Allergies   Allergen Reactions   • Amoxicillin Shortness Of Breath   • Ceclor [Cefaclor] Shortness Of Breath   • Penicillins Shortness Of Breath   • Sulfa Antibiotics Rash   • Valium [Diazepam] Mental Status Change   • Ambien [Zolpidem] Unknown - Low Severity   • Aspirin GI Intolerance   • Ativan [Lorazepam] Unknown - Low Severity   • Benadryl [Diphenhydramine] Unknown - Low Severity   • Biaxin [Clarithromycin] Unknown - Low Severity   • Cefazolin Unknown - Low Severity   • Cephalexin Unknown - Low Severity   • Cephalosporins Unknown - Low Severity   • Clindamycin Unknown - Low Severity   • Compazine [Prochlorperazine Edisylate] Rash   • Contrast Dye Other (See Comments)     Caused pain in her arm   • Doxycycline GI Intolerance   • Eggs Or Egg-Derived Products Unknown - Low Severity   • Nsaids Unknown - Low Severity   • Phenergan [Promethazine Hcl] Rash   • Promethazine Unknown - Low Severity   • Vancomycin Itching       Family History   Problem Relation Age of Onset   • Pancreatic cancer Sister    • Pancreatic cancer Paternal Grandmother    • Malig Hyperthermia Neg Hx         Social History     Social History Narrative   • Not on file       Immunization:    There is no immunization history on file for this patient.     Objective     Review of Systems   Constitutional: Negative for fever and  "unexpected weight loss.   Eyes: Negative for blurred vision and double vision.   Respiratory: Negative for cough and shortness of breath.    Cardiovascular: Negative for chest pain and palpitations.   Gastrointestinal:        See HPI   Genitourinary: Negative for dysuria and hematuria.   Musculoskeletal: Negative for gait problem and joint swelling.   Skin: Negative for rash and skin lesions.   Neurological: Negative for seizures, weakness, numbness and confusion.   Psychiatric/Behavioral: Negative for sleep disturbance and depressed mood.        Vital Signs:   BP 99/62   Ht 170.2 cm (67\")   Wt 75.3 kg (166 lb)   BMI 26.00 kg/m²       Physical Exam  Constitutional:       General: She is not in acute distress.     Appearance: Normal appearance. She is well-developed and normal weight.   HENT:      Head: Normocephalic and atraumatic.   Eyes:      Conjunctiva/sclera: Conjunctivae normal.      Pupils: Pupils are equal, round, and reactive to light.      Visual Fields: Right eye visual fields normal and left eye visual fields normal.   Cardiovascular:      Rate and Rhythm: Normal rate and regular rhythm.      Heart sounds: Normal heart sounds.   Pulmonary:      Effort: Pulmonary effort is normal. No retractions.      Breath sounds: Normal breath sounds and air entry.   Abdominal:      General: Bowel sounds are normal.      Palpations: Abdomen is soft.      Tenderness: There is no abdominal tenderness.      Comments: No appreciable hepatosplenomegaly or ascites   Musculoskeletal:         General: Normal range of motion.      Cervical back: Neck supple.      Right lower leg: No edema.      Left lower leg: No edema.   Lymphadenopathy:      Cervical: No cervical adenopathy.   Skin:     General: Skin is warm and dry.      Findings: No lesion.   Neurological:      General: No focal deficit present.      Mental Status: She is alert and oriented to person, place, and time.   Psychiatric:         Mood and Affect: Mood and " affect normal.         Behavior: Behavior normal.         Result Review :   The following data was reviewed by: LUZ Rene on 07/14/2021:  CMP    CMP 4/4/21 6/6/21 6/7/21   Glucose  197 (A) 118 (A)   Glucose 150 (A)     BUN 8 12 10   Creatinine 0.60 0.47 (A) 0.57   eGFR Non African Am  138 110   Sodium 138 136 137   Potassium 4.1 3.9 4.0   Chloride 102 101 102   Calcium 8.7 8.5 (A) 8.4 (A)   Albumin 3.4 (A) 3.90    Total Bilirubin 0.29 0.2    Alkaline Phosphatase 69 71    AST (SGOT) 20 12    ALT (SGPT) 20 7    (A) Abnormal value       Comments are available for some flowsheets but are not being displayed.           CBC    CBC 6/6/21 6/7/21 6/21/21   WBC 5.35 5.02 4.38   RBC 4.31 4.50 4.56   Hemoglobin 11.6 (A) 12.0 12.3   Hematocrit 37.1 39.3 37.8   MCV 86.1 87.3 82.9   MCH 26.9 26.7 27.0   MCHC 31.3 (A) 30.5 (A) 32.5   RDW 14.1 14.1 13.5   Platelets 153 144 177   (A) Abnormal value                         Assessment and Plan    Diagnoses and all orders for this visit:    1. Gastroesophageal reflux disease, unspecified whether esophagitis present (Primary)  -     Case Request; Standing  -     Case Request    2. Hepatic steatosis    3. History of Hodgkin's lymphoma  -     Case Request; Standing  -     Case Request    4. Change in bowel habits  -     Case Request; Standing  -     Case Request    5. Nausea  -     Case Request; Standing  -     Case Request    6. Pre-op testing  -     COVID PRE-OP / PRE-PROCEDURE SCREENING ORDER (NO ISOLATION) - Swab, Nasopharynx; Future    7. Diarrhea, unspecified type    Other orders  -     Follow Anesthesia Guidelines / Protocol; Future  -     Verify NPO; Standing  -     Verify Bowel Prep Was Successful; Standing  -     Give Tap Water Enema If Bowel Prep Insufficient; Standing    Fiber samples given to patient.  Encouraged her to take daily for altered bowel pattern.      New RX sent for protonix and pepcid for epigastric pain/reflux.    ESOPHAGOGASTRODUODENOSCOPY  (N/A), COLONOSCOPY (N/A)        Follow Up   Return for F/U after procedure.  Patient was given instructions and counseling regarding her condition or for health maintenance advice. Please see specific information pulled into the AVS if appropriate.

## 2021-07-14 NOTE — TELEPHONE ENCOUNTER
Patient notified.   Patient says she is still taking oral abx As prescribed complains of mild upset stomach and would like some Zofran if you could send in some. Also wanted to know if she can get a cream for her legs for itching before she wraps her legs and if she should be doing that more frequently, I asked her if she had wound care she said she had in the past, but they have not been by in a while.   Also wanted to let you know her right leg has been draining fluid.   Also has not heard from Lymphedema clinic and needs update on derm referral. I will check on these.

## 2021-07-14 NOTE — TELEPHONE ENCOUNTER
Power chair repair order faxed. Please make sure pt finishes abx completely as this is the only oral medication she is able to take to cover both infections in bladder and on legs. If she is unable to finish abx as prescribed she needs to go to the ER for inpatient IV abx like I initially encouraged her to do.

## 2021-07-15 VITALS
DIASTOLIC BLOOD PRESSURE: 76 MMHG | BODY MASS INDEX: 29.02 KG/M2 | SYSTOLIC BLOOD PRESSURE: 130 MMHG | HEIGHT: 64 IN | WEIGHT: 170 LBS | HEART RATE: 112 BPM | TEMPERATURE: 97.6 F | OXYGEN SATURATION: 96 %

## 2021-07-15 RX ORDER — ONDANSETRON 4 MG/1
4 TABLET, FILM COATED ORAL EVERY 8 HOURS PRN
Qty: 8 TABLET | Refills: 0 | Status: SHIPPED | OUTPATIENT
Start: 2021-07-15 | End: 2022-02-03 | Stop reason: SDUPTHER

## 2021-07-15 NOTE — TELEPHONE ENCOUNTER
Zofran sent.   Pt needs to see wound care for additional dressing/creams. It appears she NO SHOWED her apt so she will be responsible for calling them and rescheduling herself.  If having leg redness, warmth, fever needs to go to ER for eval/admission.

## 2021-07-15 NOTE — TELEPHONE ENCOUNTER
Discussed this with patient, says she was not aware of appt with wound care. Was advised to call them and reschedule appt with wound care.

## 2021-07-16 ENCOUNTER — PATIENT OUTREACH (OUTPATIENT)
Dept: CASE MANAGEMENT | Facility: OTHER | Age: 56
End: 2021-07-16

## 2021-07-16 ENCOUNTER — TELEPHONE (OUTPATIENT)
Dept: INTERNAL MEDICINE | Facility: CLINIC | Age: 56
End: 2021-07-16

## 2021-07-16 NOTE — TELEPHONE ENCOUNTER
Caller: Isha Llanes    Relationship: Self    Best call back number: [phone]    What is the best time to reach you: ANY    Who are you requesting to speak with (clinical staff, provider,  specific staff member): CLINICAL STAFF    What was the call regarding: PATIENT STATED SHE WOULD LIKE TO SPEAK TO PCP ABOUT A CREAM FOR HER LEGS. AND SHE WOULD LIKE TO GO TO A NEW PAIN CLINIC AND BE REFERRED TO  [facility]. PATIENT STATED SHE WOULD LIKE A CALL BACK. PLEASE ADVISE THANK YOU.     Do you require a callback: YES

## 2021-07-19 NOTE — OUTREACH NOTE
"Patient Outreach    MSW contacted patient to follow up regarding wheelchair script. MSW faxed wheelchair script on 7/16/21 to Mich at [facility]. MSW also inquired about missed Infectious Disease appointment. Patient states \"I forgot about that appointment- can you give me the number to re-schedule?\" MSW provided phone number to Infectious Disease office, Wound Care, and Dermatology to re-schedule appointments as patient has cancelled or no showed to all of these appointments recently. Patient was agreeable to [facility] referral for provider to come into home as patient states that she has trouble leaving home and has had many missed appointments recently. MSW spoke with Heather at [facility] and she states that [facility] is not accepting new referrals at this time, but she has received intake referral with patient information for when they are accepting in the next few weeks. MSW to follow up.   "
atrial fibrillation/high blood pressure

## 2021-07-20 DIAGNOSIS — G89.29 OTHER CHRONIC PAIN: Primary | ICD-10-CM

## 2021-07-20 NOTE — TELEPHONE ENCOUNTER
Numerous calls/messages have been placed about this request. Pt was told numerous times that she needs to follow up with wound care in order to get additional creams for leg infections. Pt was told she needed to see infectious disease for further eval of infections. , Monica Vu, set up an apt with infectious disease and arranged transportation and pt no-showed the apt. NO further creams will be sent in for patient from our office without an apt, she must follow up with specialist as recommended. New pain mgmt referral placed. I am not sure if local referral will be possible since pt has been discharged from local pain clinics in area but referral placed. Pt may have to travel out of town to be seen at pain mgmt, I am not sure.

## 2021-07-24 ENCOUNTER — HOSPITAL ENCOUNTER (EMERGENCY)
Facility: HOSPITAL | Age: 56
Discharge: HOME | End: 2021-07-25
Attending: EMERGENCY MEDICINE | Admitting: EMERGENCY MEDICINE

## 2021-07-24 DIAGNOSIS — I87.2 VENOUS STASIS DERMATITIS OF BOTH LOWER EXTREMITIES: ICD-10-CM

## 2021-07-24 DIAGNOSIS — R60.0 PEDAL EDEMA: Primary | ICD-10-CM

## 2021-07-24 PROCEDURE — 99284 EMERGENCY DEPT VISIT MOD MDM: CPT

## 2021-07-24 RX ORDER — SODIUM CHLORIDE 0.9 % (FLUSH) 0.9 %
10 SYRINGE (ML) INJECTION AS NEEDED
Status: DISCONTINUED | OUTPATIENT
Start: 2021-07-24 | End: 2021-07-25 | Stop reason: HOSPADM

## 2021-07-25 VITALS
HEIGHT: 66 IN | RESPIRATION RATE: 20 BRPM | HEART RATE: 105 BPM | OXYGEN SATURATION: 100 % | WEIGHT: 173.72 LBS | DIASTOLIC BLOOD PRESSURE: 62 MMHG | SYSTOLIC BLOOD PRESSURE: 117 MMHG | TEMPERATURE: 98.5 F | BODY MASS INDEX: 27.92 KG/M2

## 2021-07-25 LAB
ALBUMIN SERPL-MCNC: 3.8 G/DL (ref 3.5–5.2)
ALBUMIN/GLOB SERPL: 1.2 G/DL
ALP SERPL-CCNC: 79 U/L (ref 39–117)
ALT SERPL W P-5'-P-CCNC: 10 U/L (ref 1–33)
ANION GAP SERPL CALCULATED.3IONS-SCNC: 7.4 MMOL/L (ref 5–15)
AST SERPL-CCNC: 13 U/L (ref 1–32)
BASOPHILS # BLD AUTO: 0.01 10*3/MM3 (ref 0–0.2)
BASOPHILS NFR BLD AUTO: 0.2 % (ref 0–1.5)
BILIRUB SERPL-MCNC: <0.2 MG/DL (ref 0–1.2)
BUN SERPL-MCNC: 14 MG/DL (ref 6–20)
BUN/CREAT SERPL: 24.6 (ref 7–25)
CALCIUM SPEC-SCNC: 9 MG/DL (ref 8.6–10.5)
CHLORIDE SERPL-SCNC: 101 MMOL/L (ref 98–107)
CO2 SERPL-SCNC: 29.6 MMOL/L (ref 22–29)
CREAT SERPL-MCNC: 0.57 MG/DL (ref 0.57–1)
D-LACTATE SERPL-SCNC: 1.2 MMOL/L (ref 0.5–2)
DEPRECATED RDW RBC AUTO: 45.1 FL (ref 37–54)
EOSINOPHIL # BLD AUTO: 0.13 10*3/MM3 (ref 0–0.4)
EOSINOPHIL NFR BLD AUTO: 2.5 % (ref 0.3–6.2)
ERYTHROCYTE [DISTWIDTH] IN BLOOD BY AUTOMATED COUNT: 14.6 % (ref 12.3–15.4)
GFR SERPL CREATININE-BSD FRML MDRD: 110 ML/MIN/1.73
GLOBULIN UR ELPH-MCNC: 3.1 GM/DL
GLUCOSE SERPL-MCNC: 161 MG/DL (ref 65–99)
HCT VFR BLD AUTO: 38.5 % (ref 34–46.6)
HGB BLD-MCNC: 11.9 G/DL (ref 12–15.9)
HOLD SPECIMEN: NORMAL
HOLD SPECIMEN: NORMAL
IMM GRANULOCYTES # BLD AUTO: 0.02 10*3/MM3 (ref 0–0.05)
IMM GRANULOCYTES NFR BLD AUTO: 0.4 % (ref 0–0.5)
LYMPHOCYTES # BLD AUTO: 1.03 10*3/MM3 (ref 0.7–3.1)
LYMPHOCYTES NFR BLD AUTO: 19.7 % (ref 19.6–45.3)
MCH RBC QN AUTO: 26.2 PG (ref 26.6–33)
MCHC RBC AUTO-ENTMCNC: 30.9 G/DL (ref 31.5–35.7)
MCV RBC AUTO: 84.6 FL (ref 79–97)
MONOCYTES # BLD AUTO: 0.51 10*3/MM3 (ref 0.1–0.9)
MONOCYTES NFR BLD AUTO: 9.8 % (ref 5–12)
NEUTROPHILS NFR BLD AUTO: 3.52 10*3/MM3 (ref 1.7–7)
NEUTROPHILS NFR BLD AUTO: 67.4 % (ref 42.7–76)
NRBC BLD AUTO-RTO: 0 /100 WBC (ref 0–0.2)
PLATELET # BLD AUTO: 139 10*3/MM3 (ref 140–450)
PMV BLD AUTO: 11.1 FL (ref 6–12)
POTASSIUM SERPL-SCNC: 4 MMOL/L (ref 3.5–5.2)
PROT SERPL-MCNC: 6.9 G/DL (ref 6–8.5)
RBC # BLD AUTO: 4.55 10*6/MM3 (ref 3.77–5.28)
SODIUM SERPL-SCNC: 138 MMOL/L (ref 136–145)
WBC # BLD AUTO: 5.22 10*3/MM3 (ref 3.4–10.8)
WHOLE BLOOD HOLD SPECIMEN: NORMAL

## 2021-07-25 PROCEDURE — 25010000002 HEPARIN LOCK FLUSH PER 10 UNITS: Performed by: EMERGENCY MEDICINE

## 2021-07-25 PROCEDURE — 80053 COMPREHEN METABOLIC PANEL: CPT | Performed by: EMERGENCY MEDICINE

## 2021-07-25 PROCEDURE — 87040 BLOOD CULTURE FOR BACTERIA: CPT | Performed by: EMERGENCY MEDICINE

## 2021-07-25 PROCEDURE — 83605 ASSAY OF LACTIC ACID: CPT | Performed by: EMERGENCY MEDICINE

## 2021-07-25 PROCEDURE — 85025 COMPLETE CBC W/AUTO DIFF WBC: CPT | Performed by: EMERGENCY MEDICINE

## 2021-07-25 RX ORDER — HEPARIN SODIUM (PORCINE) LOCK FLUSH IV SOLN 100 UNIT/ML 100 UNIT/ML
500 SOLUTION INTRAVENOUS ONCE
Status: COMPLETED | OUTPATIENT
Start: 2021-07-25 | End: 2021-07-25

## 2021-07-25 RX ORDER — HEPARIN SODIUM (PORCINE) LOCK FLUSH IV SOLN 100 UNIT/ML 100 UNIT/ML
SOLUTION INTRAVENOUS
Status: DISCONTINUED
Start: 2021-07-25 | End: 2021-07-25 | Stop reason: HOSPADM

## 2021-07-25 RX ADMIN — HEPARIN SODIUM (PORCINE) LOCK FLUSH IV SOLN 100 UNIT/ML 500 UNITS: 100 SOLUTION at 04:09

## 2021-07-25 NOTE — ED PROVIDER NOTES
Time: 11:58 PM EDT  Arrived by: private car  Chief Complaint: Bilateral lower extremity complaint    History of Present Illness:  Patient is a 55 y.o. year old female that presents to the emergency department with bilateral lower extremity redness and pain that has been present for over a year but worsened within the past two weeks. She finished a 12 day course of antibiotics yesterday but the sores on her legs will not heal. She also complains of groin pain that radiates into the central abdomen and a subjective fever with chills. She has been in remission for the past 2.5 years.       History provided by:  Patient  Leg Pain  Location:  Leg  Injury: no    Leg location:  L leg and R leg  Pain details:     Radiates to:  Does not radiate    Severity:  Moderate    Onset quality:  Gradual    Timing:  Constant  Chronicity:  Chronic (worse over the past two weeks)  Associated symptoms: fever (subjective)          Patient Care Team  Primary Care Provider: Damari Cornejo PA-C    Past Medical History:     Allergies   Allergen Reactions   • Amoxicillin Shortness Of Breath   • Ceclor [Cefaclor] Shortness Of Breath   • Penicillins Shortness Of Breath   • Sulfa Antibiotics Rash   • Valium [Diazepam] Mental Status Change   • Ambien [Zolpidem] Unknown - Low Severity   • Aspirin GI Intolerance   • Ativan [Lorazepam] Unknown - Low Severity   • Benadryl [Diphenhydramine] Unknown - Low Severity   • Biaxin [Clarithromycin] Unknown - Low Severity   • Cefazolin Unknown - Low Severity   • Cephalexin Unknown - Low Severity   • Cephalosporins Unknown - Low Severity   • Clindamycin Unknown - Low Severity   • Compazine [Prochlorperazine Edisylate] Rash   • Contrast Dye Other (See Comments)     Caused pain in her arm   • Doxycycline GI Intolerance   • Eggs Or Egg-Derived Products Unknown - Low Severity   • Nsaids Unknown - Low Severity   • Phenergan [Promethazine Hcl] Rash   • Promethazine Unknown - Low Severity   • Vancomycin Itching      Past Medical History:   Diagnosis Date   • Anxiety     NO CURRENT MEDICATION   • Aortic stenosis, severe    • Arthritis    • Asthma    • Back pain    • Blood clotting disorder (CMS/HCC)    • Cancer associated pain    • Chronic low back pain with sciatica    • Diabetes mellitus (CMS/HCC)     TYPE 2   • Dyspnea on effort    • GERD (gastroesophageal reflux disease)    • Hiatal hernia    • History of degenerative disc disease    • History of kidney stones    • History of pulmonary embolus (PE)    • History of transfusion    • Hodgkin's lymphoma (CMS/HCC)    • Immobility    • Lupus (CMS/HCC)    • Mitral valve prolapse    • Pelvic pain    • Peripheral neuropathy    • Personal history of DVT (deep vein thrombosis)    • Presence of intrathecal pump    • Sacroiliac joint disease    • SI (sacroiliac) joint inflammation (CMS/HCC)    • Venous insufficiency      Past Surgical History:   Procedure Laterality Date   • BACK SURGERY      SPINAL FUSION    • BLADDER REPAIR     • CARDIAC CATHETERIZATION N/A 3/6/2019    Procedure: Valvuloplasty;  Surgeon: Wayne Johnson MD;  Location: [facility] INVASIVE LOCATION;  Service: Cardiology   • CARDIAC VALVE REPLACEMENT  2021   • CHOLECYSTECTOMY     • HYSTERECTOMY     • LYMPHADENECTOMY     • PACEMAKER IMPLANTATION     • PAIN PUMP INSERTION/REVISION N/A 10/18/2019    Procedure: PAIN PUMP INSERTION [facility] 10-18-19 [place];  Surgeon: Horace Rios MD;  Location: [facility] OR;  Service: Pain Management   • PAIN PUMP INSERTION/REVISION N/A 11/23/2020    Procedure: pain pump removal;  Surgeon: Horace Rios MD;  Location: [facility] OR;  Service: Pain Management;  Laterality: N/A;   • TONSILLECTOMY     • TUBAL ABDOMINAL LIGATION     • TUMOR REMOVAL       Family History   Problem Relation Age of Onset   • Pancreatic cancer Sister    • Pancreatic cancer Paternal Grandmother    • Malig Hyperthermia Neg Hx        Home Medications:  Prior to Admission medications     Medication Sig Start Date End Date Taking? Authorizing Provider   Accu-Chek Guide test strip USE AS DIRECTED TO test blood sugars FOUR TIMES DAILY 11/21/20   Nette Jiménez MD   acetaminophen (TYLENOL) 500 MG tablet Take 1,000-1,500 mg by mouth Every 6 (Six) Hours As Needed for Mild Pain .    Nette Jiménez MD   albuterol (PROVENTIL HFA;VENTOLIN HFA) 108 (90 Base) MCG/ACT inhaler Inhale 2 puffs Every 4 (Four) Hours As Needed for Wheezing.    Nette Jiménez MD   albuterol sulfate  (90 Base) MCG/ACT inhaler albuterol sulfate 90 mcg/actuation inhalation HFA aerosol inhaler inhale 1 - 2 puffs by inhalation route every 4 hours as needed   Suspended    Nette Jiménez MD   clopidogrel (Plavix) 75 MG tablet Plavix 75 mg oral tablet take 1 tablet (75 mg) by oral route once daily   Active 5/1/21   Nette Jiménez MD   furosemide (LASIX) 40 MG tablet Take 40 mg by mouth Daily.    Nette Jiménez MD   gabapentin (NEURONTIN) 300 MG capsule TAKE ONE CAPSULE BY MOUTH THREE TIMES DAILY 2/22/21   Horace Rios MD   HYDROmorphone (DILAUDID) 1 mg/mL solution Infuse  into a venous catheter Dose Adjusted By Provider As Needed. PER PAIN PUMP    Nette Jiménez MD   hydrOXYzine (ATARAX) 50 MG tablet Take 50 mg by mouth 4 (Four) Times a Day. 6/29/21   Nette Jiménez MD   Insulin Lispro (ADMELOG SOLOSTAR) 100 UNIT/ML injection pen Inject 12 Units under the skin into the appropriate area as directed. SLIDING SCALE R/T BS 8/26/20   Nette Jiménez MD   ipratropium-albuterol (DUO-NEB) 0.5-2.5 mg/3 ml nebulizer Inhale 1 ampule. 4/7/21   Nette Jiménez MD   ondansetron (Zofran) 4 MG tablet Take 1 tablet by mouth Every 8 (Eight) Hours As Needed for Nausea or Vomiting. 7/15/21   Damari Cornejo PA-C   sodium chloride 0.9 % solution with HYDROmorphone (PF) 50 MG/5ML solution 0.2 mg/mL Infuse 0-2 mg into a venous catheter Every 1 (One) Hour As Needed for Pain.     "Provider, Nette, MD   Sodium Sulfate-Mag Sulfate-KCl (Sutab) 7973-327-956 MG tablet Take 188 mg by mouth Continuous. 7/14/21   Sobeida Espinosa, APRN        Social History:   Social History     Tobacco Use   • Smoking status: Current Every Day Smoker     Packs/day: 2.00     Years: 41.00     Pack years: 82.00     Types: Cigarettes   • Smokeless tobacco: Never Used   Vaping Use   • Vaping Use: Never used   Substance Use Topics   • Alcohol use: No   • Drug use: No       Record Review:  I have reviewed the patient's records in [facility].     Review of Systems:  Review of Systems   Constitutional: Positive for chills and fever (subjective).   HENT: Negative for congestion, ear pain and sore throat.    Eyes: Negative for pain.   Respiratory: Negative for cough, chest tightness and shortness of breath.    Cardiovascular: Negative for chest pain.   Gastrointestinal: Positive for abdominal pain (pelvic radiating into center). Negative for diarrhea, nausea and vomiting.   Genitourinary: Negative for flank pain and hematuria.   Musculoskeletal: Negative for joint swelling.        Pain in BLE   Skin: Positive for wound (BLE). Negative for pallor.   Neurological: Negative for seizures and headaches.   All other systems reviewed and are negative.       Physical Exam:  /62 (BP Location: Right arm, Patient Position: Sitting)   Pulse 105   Temp 98.5 °F (36.9 °C) (Oral)   Resp 20   Ht 167.6 cm (66\")   Wt 78.8 kg (173 lb 11.6 oz)   SpO2 100%   BMI 28.04 kg/m²     Physical Exam  Vitals and nursing note reviewed.   Constitutional:       General: She is not in acute distress.     Appearance: Normal appearance. She is not toxic-appearing.   HENT:      Head: Normocephalic and atraumatic.      Mouth/Throat:      Mouth: Mucous membranes are moist.   Eyes:      Extraocular Movements: Extraocular movements intact.      Pupils: Pupils are equal, round, and reactive to light.   Cardiovascular:      Rate and Rhythm: Normal rate " and regular rhythm.      Pulses: Normal pulses.      Heart sounds: Normal heart sounds.   Pulmonary:      Effort: Pulmonary effort is normal. No respiratory distress.      Breath sounds: Normal breath sounds.   Abdominal:      General: Abdomen is flat.      Palpations: Abdomen is soft.      Tenderness: There is no abdominal tenderness.   Musculoskeletal:         General: Normal range of motion.      Cervical back: Normal range of motion and neck supple.      Comments: Port in the left upper chest wall.    Skin:     General: Skin is warm and dry.      Comments: There is severe erythema to the BLE with excoriations and weeping wounds. Moderate pitting edema bilaterally.    Neurological:      Mental Status: She is alert and oriented to person, place, and time. Mental status is at baseline.                Medications in the Emergency Department:  Medications   sodium chloride 0.9 % flush 10 mL (has no administration in time range)        Labs  Lab Results (last 24 hours)     Procedure Component Value Units Date/Time    CBC & Differential [[id number]]  (Abnormal) Collected: 07/25/21 0011    Specimen: Blood Updated: 07/25/21 0025    Narrative:      The following orders were created for panel order CBC & Differential.  Procedure                               Abnormality         Status                     ---------                               -----------         ------                     CBC Auto Differential[[id number]]        Abnormal            Final result                 Please view results for these tests on the individual orders.    Comprehensive Metabolic Panel [[id number]]  (Abnormal) Collected: 07/25/21 0011    Specimen: Blood Updated: 07/25/21 0048     Glucose 161 mg/dL      BUN 14 mg/dL      Creatinine 0.57 mg/dL      Sodium 138 mmol/L      Potassium 4.0 mmol/L      Chloride 101 mmol/L      CO2 29.6 mmol/L      Calcium 9.0 mg/dL      Total Protein 6.9 g/dL      Albumin 3.80 g/dL      ALT (SGPT) 10 U/L      AST  (SGOT) 13 U/L      Alkaline Phosphatase 79 U/L      Total Bilirubin <0.2 mg/dL      eGFR Non African Amer 110 mL/min/1.73      Globulin 3.1 gm/dL      A/G Ratio 1.2 g/dL      BUN/Creatinine Ratio 24.6     Anion Gap 7.4 mmol/L     Narrative:      GFR Normal >60  Chronic Kidney Disease <60  Kidney Failure <15      Lactic Acid, Plasma [[id number]]  (Normal) Collected: 07/25/21 0011    Specimen: Blood Updated: 07/25/21 0043     Lactate 1.2 mmol/L     Blood Culture - Blood, Arm, Left [[id number]] Collected: 07/25/21 0011    Specimen: Blood from Arm, Left Updated: 07/25/21 0023    Blood Culture - Blood, Arm, Left [[id number]] Collected: 07/25/21 0011    Specimen: Blood from Arm, Left Updated: 07/25/21 0022    CBC Auto Differential [[id number]]  (Abnormal) Collected: 07/25/21 0011    Specimen: Blood Updated: 07/25/21 0025     WBC 5.22 10*3/mm3      RBC 4.55 10*6/mm3      Hemoglobin 11.9 g/dL      Hematocrit 38.5 %      MCV 84.6 fL      MCH 26.2 pg      MCHC 30.9 g/dL      RDW 14.6 %      RDW-SD 45.1 fl      MPV 11.1 fL      Platelets 139 10*3/mm3      Neutrophil % 67.4 %      Lymphocyte % 19.7 %      Monocyte % 9.8 %      Eosinophil % 2.5 %      Basophil % 0.2 %      Immature Grans % 0.4 %      Neutrophils, Absolute 3.52 10*3/mm3      Lymphocytes, Absolute 1.03 10*3/mm3      Monocytes, Absolute 0.51 10*3/mm3      Eosinophils, Absolute 0.13 10*3/mm3      Basophils, Absolute 0.01 10*3/mm3      Immature Grans, Absolute 0.02 10*3/mm3      nRBC 0.0 /100 WBC            Imaging:  No Radiology Exams Resulted Within Past 24 Hours    Procedures:  Procedures    Progress                            Medical Decision Making:  MDM  Number of Diagnoses or Management Options  Pedal edema: established and worsening  Venous stasis dermatitis of both lower extremities: established and worsening  Diagnosis management comments: Patient's bilateral lower extremities appear more consistent with venous stasis dermatitis rather than acute infection.   Patient recently finished a course of linezolid with no improvement.  She is afebrile and has a normal white count.  Encourage her to follow-up with the wound clinic.  To continue to elevate her legs.  We will increase her Lasix dose for the next 3 days.  She expresses understanding and agreement with plan.  We discussed return precautions including worsening symptoms or any additional concerns.       Amount and/or Complexity of Data Reviewed  Clinical lab tests: reviewed and ordered         Final diagnoses:   Pedal edema   Venous stasis dermatitis of both lower extremities        Disposition:  ED Disposition     ED Disposition Condition Comment    Discharge Stable           Documentation assistance provided by India Alvarado acting as scribe for Manuelito Lora MD. Information recorded by the scribe was done at my direction and has been verified and validated by me.        India Alvarado  07/25/21 0009       Manuelito Lora MD  07/25/21 0722

## 2021-07-25 NOTE — DISCHARGE INSTRUCTIONS
Please increase your Lasix dose to 2 tablets once a day for the next 3 days.  Please follow-up with the wound clinic.

## 2021-07-26 RX ORDER — TRIAMCINOLONE ACETONIDE 1 MG/G
CREAM TOPICAL
COMMUNITY
Start: 2021-06-09 | End: 2021-08-31

## 2021-07-27 RX ORDER — DEXAMETHASONE 4 MG/1
TABLET ORAL
Qty: 12 G | Refills: 2 | Status: SHIPPED | OUTPATIENT
Start: 2021-07-27 | End: 2023-03-14

## 2021-07-30 ENCOUNTER — TELEPHONE (OUTPATIENT)
Dept: PAIN MEDICINE | Facility: CLINIC | Age: 56
End: 2021-07-30

## 2021-07-30 DIAGNOSIS — I83.019 VENOUS ULCER OF BOTH LOWER EXTREMITIES WITH VARICOSE VEINS (HCC): Primary | ICD-10-CM

## 2021-07-30 DIAGNOSIS — L97.919 VENOUS ULCER OF BOTH LOWER EXTREMITIES WITH VARICOSE VEINS (HCC): Primary | ICD-10-CM

## 2021-07-30 DIAGNOSIS — I83.029 VENOUS ULCER OF BOTH LOWER EXTREMITIES WITH VARICOSE VEINS (HCC): Primary | ICD-10-CM

## 2021-07-30 DIAGNOSIS — L97.929 VENOUS ULCER OF BOTH LOWER EXTREMITIES WITH VARICOSE VEINS (HCC): Primary | ICD-10-CM

## 2021-07-30 LAB
BACTERIA SPEC AEROBE CULT: NORMAL
BACTERIA SPEC AEROBE CULT: NORMAL

## 2021-07-30 RX ORDER — MAGNESIUM HYDROXIDE 1200 MG/15ML
500 LIQUID ORAL ONCE
Status: CANCELLED | OUTPATIENT
Start: 2021-07-30 | End: 2021-07-30

## 2021-08-01 RX ORDER — ELASTIC BANDAGE 3"
1 BANDAGE TOPICAL DAILY
Qty: 60 EACH | Refills: 0 | Status: SHIPPED | OUTPATIENT
Start: 2021-08-01 | End: 2022-11-15

## 2021-08-04 RX ORDER — MAGNESIUM HYDROXIDE 1200 MG/15ML
500 LIQUID ORAL ONCE
Status: SHIPPED | OUTPATIENT
Start: 2021-08-04

## 2021-08-04 NOTE — TELEPHONE ENCOUNTER
Caller: Isha Llanes    Relationship: Self    Best call back number: [phone]    What is the best time to reach you: ANYTIME    Who are you requesting to speak with (clinical staff, provider,  specific staff member): [place]      What was the call regarding: PLEASE PLEASE CALL BACK TO PATIENT. PATIENT IS FRUSTRATED SHE IS NOT GETTING ANY CALLS BACK.    Do you require a callback: YES

## 2021-08-05 NOTE — TELEPHONE ENCOUNTER
"I called the patient, left message to inform that the \"requested supplies were sent to the pharmacy for a one time fill. Any additional supplies would need to come to wound care.\" If the patient returns the call, please advise her that per Damari Cornejo, she needs to have her wounds/legs evaluated frequently by wound care. Any additional supplies to be discussed with wound care or infection disease.    "

## 2021-08-06 PROBLEM — S81.809D: Status: ACTIVE | Noted: 2021-08-06

## 2021-08-06 PROBLEM — A49.01 MSSA (METHICILLIN SUSCEPTIBLE STAPHYLOCOCCUS AUREUS) INFECTION: Status: ACTIVE | Noted: 2021-08-06

## 2021-08-06 NOTE — ASSESSMENT & PLAN NOTE
Bilateral  Improving slightly on Linezolid that patient had leftover from previous Rx.  Counseled patient on the importance of keeping her appointments with vascular surgery and wound care to allow then to treat her chronic wounds

## 2021-08-06 NOTE — ASSESSMENT & PLAN NOTE
"Patient treating with Linezolid that she had leftover from previous treatment  This is concerning in and of itself as she should not have \"leftover\" medication from previous abx course  Clinic  is working on getting new Linezolid prescription covered by insurance and patient assistance as this is essentially her only Non-IV option for treatment and she has shown that IV abx through home health is not a viable option due to her hx of non-compliance. Her multiple reported drug allergies would make this problematic as well.   We are also working to get her set up with Infectious disease for further advise on abx treatment as her multiple drug allergies and other treatment limitations make her a very challenging case for out patient management.   She again declines hospital admission today despite counseling that this would be her best option to get the medication treatment and wound care that would best serve her and allow her condition to be treated in the most timely manner.   "

## 2021-08-10 ENCOUNTER — TELEPHONE (OUTPATIENT)
Dept: INTERNAL MEDICINE | Facility: CLINIC | Age: 56
End: 2021-08-10

## 2021-08-10 NOTE — TELEPHONE ENCOUNTER
Caller: Isha Llanes    Relationship: Self    Best call back number: [phone]    Medication needed:   (MURPHARINE?) LEG CREAM    When do you need the refill by: 8/10/21    Does the patient have less than a 3 day supply:  [x] Yes  [] No    What is the patient's preferred pharmacy: [facility] - [place] - [address] [facility] - [phone]  - [phone] FX

## 2021-08-11 NOTE — TELEPHONE ENCOUNTER
Please call pt to let her know that as discussed numerous times, she must see wound care and ID for further treatment of leg infections. No medications can be sent from our office because she needs more extensive wound care management and CLOSE monitoring of wounds due to recurrent infection and numerous antibiotic allergies.

## 2021-08-16 ENCOUNTER — TELEPHONE (OUTPATIENT)
Dept: INTERNAL MEDICINE | Facility: CLINIC | Age: 56
End: 2021-08-16

## 2021-08-16 NOTE — TELEPHONE ENCOUNTER
Caller: Isha Llanes    Relationship: Self    Best call back number: [phone]    What orders are you requesting  HOME HEALTH ORDER FOR WOUND CARE    PT DOES NOT WANT TO USE CARETENDERS    Additional notes: PLEASE CALL AND ADVISE, LEAVE DETAILED MESSAGE IF SHE DOESN'T ANSWER

## 2021-08-24 ENCOUNTER — TELEPHONE (OUTPATIENT)
Dept: INTERNAL MEDICINE | Facility: CLINIC | Age: 56
End: 2021-08-24

## 2021-08-24 ENCOUNTER — TELEPHONE (OUTPATIENT)
Dept: INFECTIOUS DISEASES | Facility: CLINIC | Age: 56
End: 2021-08-24

## 2021-08-24 NOTE — TELEPHONE ENCOUNTER
Patient called wanting an appt for her legs and so wound care can start coming to the house again because she doesn't want to go  There. Please advise. I did discuss with her that she needed to call wound care and make an appointment with them so they can assess her legs and can check for staph if they need to and can treat if needed also. She was upset and said we found the staph infection not wound care and wanted something done. Told her she would need to see and follow up with wound care. She was supposed to call and schedule that appointment. Please advise if there is anything else we need to do for patient.

## 2021-08-24 NOTE — TELEPHONE ENCOUNTER
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 She was seen and treated about one year ago for the same thing. She saw her PCP, Eugenia Cornejo, and they did a culture and put her on ?Linezolid. She is done with the medication and her legs are still bad. Her PCP informed her to call us about being seen. Please advise, JORDI, RN

## 2021-08-25 NOTE — TELEPHONE ENCOUNTER
Please call pt and tell her she must be seen by a MD for wound evaluation, please reschedule her apt with wound care and ID (she no showed both appointments previously). Home health is not appropriate at this time because they do not have provider to see/evaluate/treat wounds appropriately.

## 2021-08-25 NOTE — TELEPHONE ENCOUNTER
Left message for patient to call our office for appointment info with Dr. Schwab. She is scheduled on 8/31/21 at 1pm for follow up of MRSA infection in legs. JORDI, RN

## 2021-08-25 NOTE — TELEPHONE ENCOUNTER
Patient's daughter called back and they are aware of her appt for 8/31/21. I advised them to come in around 12:30 in case she needs to complete any new paperwork and bring insurance info. JORDI, RN

## 2021-08-26 RX ORDER — FUROSEMIDE 40 MG/1
40 TABLET ORAL DAILY
Qty: 90 TABLET | Refills: 1 | Status: SHIPPED | OUTPATIENT
Start: 2021-08-26 | End: 2022-03-25 | Stop reason: SDUPTHER

## 2021-08-26 NOTE — TELEPHONE ENCOUNTER
Patient has a appointment with ID on 31st. Will wait on ID appointment per patient request to have wounds evaluated

## 2021-08-31 ENCOUNTER — OFFICE VISIT (OUTPATIENT)
Dept: INFECTIOUS DISEASES | Facility: CLINIC | Age: 56
End: 2021-08-31

## 2021-08-31 VITALS
DIASTOLIC BLOOD PRESSURE: 73 MMHG | SYSTOLIC BLOOD PRESSURE: 113 MMHG | HEART RATE: 87 BPM | TEMPERATURE: 98.1 F | HEIGHT: 66 IN | BODY MASS INDEX: 28.05 KG/M2

## 2021-08-31 DIAGNOSIS — Z23 NEED FOR COVID-19 VACCINE: ICD-10-CM

## 2021-08-31 DIAGNOSIS — I87.2 VENOUS STASIS DERMATITIS OF BOTH LOWER EXTREMITIES: Primary | ICD-10-CM

## 2021-08-31 PROCEDURE — 99214 OFFICE O/P EST MOD 30 MIN: CPT | Performed by: INTERNAL MEDICINE

## 2021-08-31 NOTE — PROGRESS NOTES
"cc: f/u le cellulitis?      Very nice 55-year-old I evaluated in 2019 for MRSA.  She underwent MRSA decolonization at that time and I diagnosed her with chronic venous stasis and venous stasis dermatitis.  She was given topical steroids and improved.  More recently, she said recurrent bilateral lower extremity swelling and open wounds.  Per prior records: Wound culture had grown MSSA on June 21, 2021, and she received treatment with linezolid.  She has not been routinely following up with wound care.  She says that her legs are driving her crazy and she has intense pruritus    Blood pressure 113/73, pulse 87, temperature 98.1 °F (36.7 °C), height 167.6 cm (65.98\"), not currently breastfeeding.  GENERAL: Awake and alert, in no acute distress.   HEENT: Oropharynx is clear. Hearing is grossly normal.   SKIN: Warm and dry with bilateral edema and excoriation marks over the lower extremities with generalized venous stasis changes with erythema.  She has several open ulcers.  PSYCHIATRIC: Appropriate mood, affect, insight, and judgment.       DIAGNOSTICS:  Lab Results   Component Value Date    WBC 5.22 07/25/2021    HGB 11.9 (L) 07/25/2021    HCT 38.5 07/25/2021     (L) 07/25/2021     Lab Results   Component Value Date    CRP 80.40 (H) 01/18/2021     Lab Results   Component Value Date    SEDRATE 21 01/18/2021     Lab Results   Component Value Date    GLUCOSE 161 (H) 07/25/2021    BUN 14 07/25/2021    CREATININE 0.57 07/25/2021    EGFRIFNONA 110 07/25/2021    BCR 24.6 07/25/2021    CO2 29.6 (H) 07/25/2021    CALCIUM 9.0 07/25/2021    ALBUMIN 3.80 07/25/2021    LABIL2 1.1 (L) 04/04/2021    AST 13 07/25/2021    ALT 10 07/25/2021         Assessment and Plan  Venous stasis dermatitis of both lower extremities: She does not have recurrent bilateral cellulitis but rather has recurrent venous stasis dermatitis from volume or lymphedema.  She must get better control of her volume status in her lower extremities if she is " going to heal this at all.  She promised me she was going to get some compression hose to help keep of the fluid off her legs.  This is not an infectious problem and she needs no antibiotics at this time.  I will add topical steroids as she has terrible pruritus.  She understands she cannot put these near her open wounds.    Covid vaccine needed counseled her on Covid vaccine and she is agreeable to get this  Tobacco use, recommended cessation but she declines

## 2021-09-06 ENCOUNTER — APPOINTMENT (OUTPATIENT)
Dept: GENERAL RADIOLOGY | Facility: HOSPITAL | Age: 56
End: 2021-09-06

## 2021-09-06 ENCOUNTER — HOSPITAL ENCOUNTER (INPATIENT)
Facility: HOSPITAL | Age: 56
LOS: 2 days | Discharge: HOME | End: 2021-09-08
Attending: EMERGENCY MEDICINE | Admitting: INTERNAL MEDICINE

## 2021-09-06 DIAGNOSIS — A41.9 SEPSIS, DUE TO UNSPECIFIED ORGANISM, UNSPECIFIED WHETHER ACUTE ORGAN DYSFUNCTION PRESENT (HCC): ICD-10-CM

## 2021-09-06 DIAGNOSIS — L03.119 CELLULITIS OF LOWER EXTREMITY, UNSPECIFIED LATERALITY: ICD-10-CM

## 2021-09-06 DIAGNOSIS — R50.9 ACUTE FEBRILE ILLNESS: Primary | ICD-10-CM

## 2021-09-06 DIAGNOSIS — N39.0 ACUTE UTI: ICD-10-CM

## 2021-09-06 DIAGNOSIS — S81.809D NON-HEALING WOUND OF LOWER EXTREMITY, SUBSEQUENT ENCOUNTER: ICD-10-CM

## 2021-09-06 LAB
ALBUMIN SERPL-MCNC: 4.4 G/DL (ref 3.5–5.2)
ALBUMIN/GLOB SERPL: 1.1 G/DL
ALP SERPL-CCNC: 94 U/L (ref 39–117)
ALT SERPL W P-5'-P-CCNC: 10 U/L (ref 1–33)
ANION GAP SERPL CALCULATED.3IONS-SCNC: 13.2 MMOL/L (ref 5–15)
AST SERPL-CCNC: 16 U/L (ref 1–32)
BACTERIA UR QL AUTO: ABNORMAL /HPF
BASOPHILS # BLD AUTO: 0.02 10*3/MM3 (ref 0–0.2)
BASOPHILS NFR BLD AUTO: 0.3 % (ref 0–1.5)
BILIRUB SERPL-MCNC: 0.4 MG/DL (ref 0–1.2)
BILIRUB UR QL STRIP: NEGATIVE
BUN SERPL-MCNC: 11 MG/DL (ref 6–20)
BUN/CREAT SERPL: 17.5 (ref 7–25)
CALCIUM SPEC-SCNC: 9.7 MG/DL (ref 8.6–10.5)
CHLORIDE SERPL-SCNC: 97 MMOL/L (ref 98–107)
CLARITY UR: ABNORMAL
CO2 SERPL-SCNC: 27.8 MMOL/L (ref 22–29)
COLOR UR: ABNORMAL
CREAT SERPL-MCNC: 0.63 MG/DL (ref 0.57–1)
D-LACTATE SERPL-SCNC: 2 MMOL/L (ref 0.5–2)
DEPRECATED RDW RBC AUTO: 47.3 FL (ref 37–54)
EOSINOPHIL # BLD AUTO: 0.05 10*3/MM3 (ref 0–0.4)
EOSINOPHIL NFR BLD AUTO: 0.7 % (ref 0.3–6.2)
ERYTHROCYTE [DISTWIDTH] IN BLOOD BY AUTOMATED COUNT: 15.6 % (ref 12.3–15.4)
GFR SERPL CREATININE-BSD FRML MDRD: 98 ML/MIN/1.73
GLOBULIN UR ELPH-MCNC: 4.1 GM/DL
GLUCOSE SERPL-MCNC: 117 MG/DL (ref 65–99)
GLUCOSE UR STRIP-MCNC: NEGATIVE MG/DL
HCT VFR BLD AUTO: 44 % (ref 34–46.6)
HGB BLD-MCNC: 13.6 G/DL (ref 12–15.9)
HGB UR QL STRIP.AUTO: ABNORMAL
HOLD SPECIMEN: NORMAL
HOLD SPECIMEN: NORMAL
HYALINE CASTS UR QL AUTO: ABNORMAL /LPF
IMM GRANULOCYTES # BLD AUTO: 0.02 10*3/MM3 (ref 0–0.05)
IMM GRANULOCYTES NFR BLD AUTO: 0.3 % (ref 0–0.5)
KETONES UR QL STRIP: NEGATIVE
LEUKOCYTE ESTERASE UR QL STRIP.AUTO: ABNORMAL
LYMPHOCYTES # BLD AUTO: 0.71 10*3/MM3 (ref 0.7–3.1)
LYMPHOCYTES NFR BLD AUTO: 9.9 % (ref 19.6–45.3)
MCH RBC QN AUTO: 25.7 PG (ref 26.6–33)
MCHC RBC AUTO-ENTMCNC: 30.9 G/DL (ref 31.5–35.7)
MCV RBC AUTO: 83.2 FL (ref 79–97)
MONOCYTES # BLD AUTO: 0.36 10*3/MM3 (ref 0.1–0.9)
MONOCYTES NFR BLD AUTO: 5 % (ref 5–12)
NEUTROPHILS NFR BLD AUTO: 6.01 10*3/MM3 (ref 1.7–7)
NEUTROPHILS NFR BLD AUTO: 83.8 % (ref 42.7–76)
NITRITE UR QL STRIP: NEGATIVE
NRBC BLD AUTO-RTO: 0 /100 WBC (ref 0–0.2)
NT-PROBNP SERPL-MCNC: 193.5 PG/ML (ref 0–900)
PH UR STRIP.AUTO: 5.5 [PH] (ref 5–8)
PLATELET # BLD AUTO: 162 10*3/MM3 (ref 140–450)
PMV BLD AUTO: 10 FL (ref 6–12)
POTASSIUM SERPL-SCNC: 4.1 MMOL/L (ref 3.5–5.2)
PROT SERPL-MCNC: 8.5 G/DL (ref 6–8.5)
PROT UR QL STRIP: ABNORMAL
RBC # BLD AUTO: 5.29 10*6/MM3 (ref 3.77–5.28)
RBC # UR: ABNORMAL /HPF
REF LAB TEST METHOD: ABNORMAL
SODIUM SERPL-SCNC: 138 MMOL/L (ref 136–145)
SP GR UR STRIP: 1.02 (ref 1–1.03)
SQUAMOUS #/AREA URNS HPF: ABNORMAL /HPF
TROPONIN T SERPL-MCNC: <0.01 NG/ML (ref 0–0.03)
UROBILINOGEN UR QL STRIP: ABNORMAL
WBC # BLD AUTO: 7.17 10*3/MM3 (ref 3.4–10.8)
WBC UR QL AUTO: ABNORMAL /HPF
WHOLE BLOOD HOLD SPECIMEN: NORMAL
WHOLE BLOOD HOLD SPECIMEN: NORMAL

## 2021-09-06 PROCEDURE — 99285 EMERGENCY DEPT VISIT HI MDM: CPT

## 2021-09-06 PROCEDURE — 80053 COMPREHEN METABOLIC PANEL: CPT | Performed by: EMERGENCY MEDICINE

## 2021-09-06 PROCEDURE — 87186 SC STD MICRODIL/AGAR DIL: CPT | Performed by: EMERGENCY MEDICINE

## 2021-09-06 PROCEDURE — 87077 CULTURE AEROBIC IDENTIFY: CPT | Performed by: EMERGENCY MEDICINE

## 2021-09-06 PROCEDURE — 87635 SARS-COV-2 COVID-19 AMP PRB: CPT | Performed by: EMERGENCY MEDICINE

## 2021-09-06 PROCEDURE — 85025 COMPLETE CBC W/AUTO DIFF WBC: CPT | Performed by: EMERGENCY MEDICINE

## 2021-09-06 PROCEDURE — 25010000002 MEROPENEM PER 100 MG: Performed by: EMERGENCY MEDICINE

## 2021-09-06 PROCEDURE — 99223 1ST HOSP IP/OBS HIGH 75: CPT | Performed by: STUDENT IN AN ORGANIZED HEALTH CARE EDUCATION/TRAINING PROGRAM

## 2021-09-06 PROCEDURE — 83605 ASSAY OF LACTIC ACID: CPT | Performed by: EMERGENCY MEDICINE

## 2021-09-06 PROCEDURE — 86140 C-REACTIVE PROTEIN: CPT | Performed by: STUDENT IN AN ORGANIZED HEALTH CARE EDUCATION/TRAINING PROGRAM

## 2021-09-06 PROCEDURE — 71045 X-RAY EXAM CHEST 1 VIEW: CPT

## 2021-09-06 PROCEDURE — 85652 RBC SED RATE AUTOMATED: CPT | Performed by: STUDENT IN AN ORGANIZED HEALTH CARE EDUCATION/TRAINING PROGRAM

## 2021-09-06 PROCEDURE — 93010 ELECTROCARDIOGRAM REPORT: CPT | Performed by: INTERNAL MEDICINE

## 2021-09-06 PROCEDURE — 84484 ASSAY OF TROPONIN QUANT: CPT | Performed by: EMERGENCY MEDICINE

## 2021-09-06 PROCEDURE — 83880 ASSAY OF NATRIURETIC PEPTIDE: CPT | Performed by: EMERGENCY MEDICINE

## 2021-09-06 PROCEDURE — 81001 URINALYSIS AUTO W/SCOPE: CPT | Performed by: EMERGENCY MEDICINE

## 2021-09-06 PROCEDURE — 93005 ELECTROCARDIOGRAM TRACING: CPT

## 2021-09-06 PROCEDURE — 36415 COLL VENOUS BLD VENIPUNCTURE: CPT

## 2021-09-06 PROCEDURE — 87086 URINE CULTURE/COLONY COUNT: CPT | Performed by: EMERGENCY MEDICINE

## 2021-09-06 PROCEDURE — 87040 BLOOD CULTURE FOR BACTERIA: CPT | Performed by: EMERGENCY MEDICINE

## 2021-09-06 PROCEDURE — 93005 ELECTROCARDIOGRAM TRACING: CPT | Performed by: EMERGENCY MEDICINE

## 2021-09-06 RX ORDER — ACETAMINOPHEN 325 MG/1
975 TABLET ORAL ONCE
Status: COMPLETED | OUTPATIENT
Start: 2021-09-06 | End: 2021-09-06

## 2021-09-06 RX ORDER — SODIUM CHLORIDE 0.9 % (FLUSH) 0.9 %
10 SYRINGE (ML) INJECTION AS NEEDED
Status: DISCONTINUED | OUTPATIENT
Start: 2021-09-06 | End: 2021-09-08 | Stop reason: HOSPADM

## 2021-09-06 RX ADMIN — ACETAMINOPHEN 975 MG: 325 TABLET ORAL at 18:52

## 2021-09-06 RX ADMIN — MEROPENEM 1 G: 1 INJECTION INTRAVENOUS at 18:58

## 2021-09-06 RX ADMIN — SODIUM CHLORIDE 2298 ML: 9 INJECTION, SOLUTION INTRAVENOUS at 18:53

## 2021-09-06 NOTE — ED NOTES
HOB ELEVATED, CALL LIGHT IN REACH. PATIENT HAS A FEVER GAVE PATIENT A BLANKET TO COVER UP WITH.      Des Miller  09/06/21 1204

## 2021-09-06 NOTE — H&P
[facility] HISTORY AND PHYSICAL  Date: 2021   Patient Name: Isha Llanes  : 1965  MRN: [id number]  Primary Care Physician:  Damari Cornejo PA-C  Date of admission: 2021    Subjective   Subjective     Chief Complaint: Fever and skin changes on lower extremities    HPI:    Isha Llanes is a 55 y.o. female with history of venous insufficiency of lower extremity, nodular sclerosing Hodgkin's lymphoma, type 2 diabetes, back pain, COPD and nicotine dependence who presented to the ED with high fever, dysuria and cellulitis of the lower extremities.  Patient stated that she does not like to visit the hospital so she has been sick at home for the past 3 days.  Patient stated that she felt like she is going to be in septic shock because she had experienced that before that we made her come to the hospital.  She felt her heart racing and felt dizzy with a high fever at home.  She said that her skin changes have been there but they are getting more erythematous and more tender recently patient states that she has dysuria been experiencing for the past 1 to 2 days.  Patient has not been on recent antibiotics.  In the ED she had a temperature of 102.4 with a heart rate in the 120s and stable blood pressure lactate was 2.  Patient was given meropenem and vancomycin in the ED and 30 cc/kg for fluid bolus.  Patient was very drowsy when I spoke to her and she said that she has a Dilaudid pump in her back for back pain.    Urinalysis shows red blood cells elevated WBC and positive for bacteria.      Personal History     Past Medical History:   Diagnosis Date   • Anxiety     NO CURRENT MEDICATION   • Aortic stenosis, severe    • Arthritis    • Asthma    • Back pain    • Blood clotting disorder (CMS/HCC)    • Cancer associated pain    • Chronic low back pain with sciatica    • Diabetes mellitus (CMS/HCC)     TYPE 2   • Dyspnea on effort    • GERD (gastroesophageal reflux disease)    •  Hiatal hernia    • History of degenerative disc disease    • History of kidney stones    • History of pulmonary embolus (PE)    • History of transfusion    • Hodgkin's lymphoma (CMS/HCC)    • Immobility    • Lupus (CMS/HCC)    • Mitral valve prolapse    • Pelvic pain    • Peripheral neuropathy    • Personal history of DVT (deep vein thrombosis)    • Presence of intrathecal pump    • Sacroiliac joint disease    • SI (sacroiliac) joint inflammation (CMS/HCC)    • Venous insufficiency          Past Surgical History:   Procedure Laterality Date   • BACK SURGERY      SPINAL FUSION    • BLADDER REPAIR     • CARDIAC CATHETERIZATION N/A 3/6/2019    Procedure: Valvuloplasty;  Surgeon: Wayne Johnson MD;  Location: [facility] CATH INVASIVE LOCATION;  Service: Cardiology   • CARDIAC VALVE REPLACEMENT  2021   • CHOLECYSTECTOMY     • HYSTERECTOMY     • LYMPHADENECTOMY     • PACEMAKER IMPLANTATION     • PAIN PUMP INSERTION/REVISION N/A 10/18/2019    Procedure: PAIN PUMP INSERTION [facility] 10-18-19 [place];  Surgeon: Horace Rios MD;  Location: [facility] MAIN OR;  Service: Pain Management   • PAIN PUMP INSERTION/REVISION N/A 11/23/2020    Procedure: pain pump removal;  Surgeon: Horace Rios MD;  Location: [facility] OR;  Service: Pain Management;  Laterality: N/A;   • TONSILLECTOMY     • TUBAL ABDOMINAL LIGATION     • TUMOR REMOVAL           Family History   Problem Relation Age of Onset   • Pancreatic cancer Sister    • Pancreatic cancer Paternal Grandmother    • Malig Hyperthermia Neg Hx          Social History     Socioeconomic History   • Marital status:      Spouse name: Not on file   • Number of children: Not on file   • Years of education: Not on file   • Highest education level: Not on file   Tobacco Use   • Smoking status: Current Every Day Smoker     Packs/day: 2.00     Years: 41.00     Pack years: 82.00     Types: Cigarettes   • Smokeless tobacco: Never Used   Vaping Use   • Vaping Use: Never used    Substance and Sexual Activity   • Alcohol use: No   • Drug use: No   • Sexual activity: Defer         Home Medications:  ACE Bandage Self-Adhering, HYDROmorphone, Insulin Lispro, Sodium Sulfate-Mag Sulfate-KCl, acetaminophen, albuterol sulfate HFA, clopidogrel, fluticasone, furosemide, gabapentin, glucose blood, hydrOXYzine, ipratropium-albuterol, ondansetron, sodium chloride 0.9 % solution with HYDROmorphone (PF) 50 MG/5ML solution 0.2 mg/mL, and triamcinolone    Allergies:  Allergies   Allergen Reactions   • Amoxicillin Shortness Of Breath   • Ceclor [Cefaclor] Shortness Of Breath   • Penicillins Shortness Of Breath   • Sulfa Antibiotics Rash   • Valium [Diazepam] Mental Status Change   • Ambien [Zolpidem] Unknown - Low Severity   • Aspirin GI Intolerance   • Ativan [Lorazepam] Unknown - Low Severity   • Benadryl [Diphenhydramine] Unknown - Low Severity   • Biaxin [Clarithromycin] Unknown - Low Severity   • Cefazolin Unknown - Low Severity   • Cephalexin Unknown - Low Severity   • Cephalosporins Unknown - Low Severity   • Clindamycin Unknown - Low Severity   • Compazine [Prochlorperazine Edisylate] Rash   • Contrast Dye Other (See Comments)     Caused pain in her arm   • Doxycycline GI Intolerance   • Eggs Or Egg-Derived Products Unknown - Low Severity   • Nsaids Unknown - Low Severity   • Phenergan [Promethazine Hcl] Rash   • Promethazine Unknown - Low Severity   • Vancomycin Itching       Review of Systems   Constitutional: Positive for chills, fatigue and fever.   HENT: Negative for postnasal drip, sinus pain, sore throat and trouble swallowing.    Eyes: Negative for pain, discharge and itching.   Respiratory: Negative for cough, chest tightness, shortness of breath and wheezing.    Cardiovascular: Negative for chest pain, palpitations and leg swelling.   Gastrointestinal: Negative for abdominal pain, blood in stool, constipation, diarrhea, nausea and vomiting.   Genitourinary: Positive for dysuria and  frequency. Negative for difficulty urinating, hematuria and urgency.   Skin: Positive for color change and wound. Negative for rash.   Neurological: Negative for dizziness, tremors, seizures, syncope, facial asymmetry, speech difficulty, weakness, light-headedness, numbness and headaches.   Psychiatric/Behavioral: Negative for agitation and confusion. The patient is not nervous/anxious and is not hyperactive.         Objective   Objective     Vitals:   Temp:  [102.4 °F (39.1 °C)] 102.4 °F (39.1 °C)  Heart Rate:  [118] 118  Resp:  [20] 20  BP: (161)/(84) 161/84    Physical Exam    Constitutional: Awake, drowsy, no acute distress   Eyes: Pupils equal, sclerae anicteric, no conjunctival injection   HENT: NCAT, mucous membranes moist   Neck: Supple, no thyromegaly, no lymphadenopathy, trachea midline   Respiratory: Clear to auscultation bilaterally, nonlabored respirations    Cardiovascular: Tachycardic, no murmurs, rubs, or gallops, palpable pedal pulses bilaterally   Gastrointestinal: Positive bowel sounds, soft, nontender, nondistended   Musculoskeletal: No bilateral ankle edema, no clubbing or cyanosis to extremities   Psychiatric: Appropriate affect, cooperative   Neurologic: Oriented x 3, strength symmetric in all extremities, Cranial Nerves grossly intact to confrontation, speech clear   Skin: Erythematous lower extremities with superficial ulcers tender on palpation    Result Review    Result Review:  I have personally reviewed the results from the time of this admission to 9/6/2021 19:16 EDT and agree with these findings:  [x]  Laboratory  []  Microbiology  [x]  Radiology  []  EKG/Telemetry   []  Cardiology/Vascular   []  Pathology  []  Old records  []  Other:    Imaging Results (Last 24 Hours)     Procedure Component Value Units Date/Time    XR Chest 1 View [[id number]] Collected: 09/06/21 1750     Updated: 09/06/21 1755    Narrative:      PROCEDURE: XR CHEST 1 VW     [facility] 1 [facility], [facility]/03/2021, 6:00.     INDICATIONS: SOA Triage Protocol     FINDINGS:   The lungs are clear bilaterally.  The cardiac and mediastinal silhouettes appear normal.  An   endovascular aortic valve repair has been performed.     CONCLUSION:   1. No acute cardiopulmonary disease.                  Odilon Sheriff M.D.         Electronically Signed and Approved By: Odilon Sheriff M.D. on 9/06/2021 at 17:50                            Assessment/Plan   Assessment / Plan     Assessment  #UTI  #Cellulitis of lower extremities  #History of diabetes  #History of insufficiency  #History of nodular sclerosing Hodgkin's lymphoma        Plan:   -Continue meropenem and vancomycin  -Blood culture  -Pending urine culture  -CRP and sed rate  -Morning labs  -Normal saline at 125 mL/h  -Full code  -Lovenox for DVT prophylaxis  -Continue home meds except Lasix because she is septic  -Admit to telemetry bed  -Sliding insulin scale  -JULIANNE            DVT prophylaxis:  No DVT prophylaxis order currently exists.    CODE STATUS:    Level Of Support Discussed With: Patient  Code Status: CPR  Medical Interventions (Level of Support Prior to Arrest): Full      Admission Status:  I believe this patient meets inpatient status.    Dudley Becerril MD

## 2021-09-06 NOTE — ED PROVIDER NOTES
Subjective   History of Present Illness patient is a 55-year-old female presents to the ER by EMS for evaluation of weakness.  The patient states that she started feeling bad yesterday and is progressively gotten worse.  Patient states that she is current and she may be getting her staph infection back in her legs.  Patient is that she has a history of staph infection/sepsis.  Patient states she had increasing weakness and generalized malaise.  Patient states that she feels like she has been running a fever today but has not checked 1 herself.  Patient states that she has got some mild shortness of breath but nothing much different than her normal COPD feeling.    Review of Systems   Constitutional: Positive for chills and fever.   HENT: Negative.    Eyes: Negative.    Respiratory: Positive for shortness of breath (Mild and chronic).    Cardiovascular: Negative.    Gastrointestinal: Negative.    Endocrine: Negative.    Genitourinary: Negative.    Musculoskeletal: Negative.    Skin:        Legs are getting redder and more sore.  Feels like last time and had sepsis   Allergic/Immunologic: Negative.    Neurological: Negative.    Hematological: Negative.    Psychiatric/Behavioral: Negative.        Past Medical History:   Diagnosis Date   • Anxiety     NO CURRENT MEDICATION   • Aortic stenosis, severe    • Arthritis    • Asthma    • Back pain    • Blood clotting disorder (CMS/HCC)    • Cancer associated pain    • Chronic low back pain with sciatica    • Diabetes mellitus (CMS/HCC)     TYPE 2   • Dyspnea on effort    • GERD (gastroesophageal reflux disease)    • Hiatal hernia    • History of degenerative disc disease    • History of kidney stones    • History of pulmonary embolus (PE)    • History of transfusion    • Hodgkin's lymphoma (CMS/HCC)    • Immobility    • Lupus (CMS/HCC)    • Mitral valve prolapse    • Pelvic pain    • Peripheral neuropathy    • Personal history of DVT (deep vein thrombosis)    • Presence of  intrathecal pump    • Sacroiliac joint disease    • SI (sacroiliac) joint inflammation (CMS/HCC)    • Venous insufficiency        Allergies   Allergen Reactions   • Amoxicillin Shortness Of Breath   • Ceclor [Cefaclor] Shortness Of Breath   • Penicillins Shortness Of Breath   • Sulfa Antibiotics Rash   • Valium [Diazepam] Mental Status Change   • Ambien [Zolpidem] Unknown - Low Severity   • Aspirin GI Intolerance   • Ativan [Lorazepam] Unknown - Low Severity   • Benadryl [Diphenhydramine] Unknown - Low Severity   • Biaxin [Clarithromycin] Unknown - Low Severity   • Cefazolin Unknown - Low Severity   • Cephalexin Unknown - Low Severity   • Cephalosporins Unknown - Low Severity   • Clindamycin Unknown - Low Severity   • Compazine [Prochlorperazine Edisylate] Rash   • Contrast Dye Other (See Comments)     Caused pain in her arm   • Doxycycline GI Intolerance   • Eggs Or Egg-Derived Products Unknown - Low Severity   • Nsaids Unknown - Low Severity   • Phenergan [Promethazine Hcl] Rash   • Promethazine Unknown - Low Severity   • Vancomycin Itching       Past Surgical History:   Procedure Laterality Date   • BACK SURGERY      SPINAL FUSION    • BLADDER REPAIR     • CARDIAC CATHETERIZATION N/A 3/6/2019    Procedure: Valvuloplasty;  Surgeon: Wayne Johnson MD;  Location: [facility] INVASIVE LOCATION;  Service: Cardiology   • CARDIAC VALVE REPLACEMENT  2021   • CHOLECYSTECTOMY     • HYSTERECTOMY     • LYMPHADENECTOMY     • PACEMAKER IMPLANTATION     • PAIN PUMP INSERTION/REVISION N/A 10/18/2019    Procedure: PAIN PUMP INSERTION [facility] 10-18-19 [place];  Surgeon: Horace Rios MD;  Location: [facility] OR;  Service: Pain Management   • PAIN PUMP INSERTION/REVISION N/A 11/23/2020    Procedure: pain pump removal;  Surgeon: Horace Rios MD;  Location: [facility] OR;  Service: Pain Management;  Laterality: N/A;   • TONSILLECTOMY     • TUBAL ABDOMINAL LIGATION     • TUMOR REMOVAL         Family History    Problem Relation Age of Onset   • Pancreatic cancer Sister    • Pancreatic cancer Paternal Grandmother    • Malig Hyperthermia Neg Hx        Social History     Socioeconomic History   • Marital status:      Spouse name: Not on file   • Number of children: Not on file   • Years of education: Not on file   • Highest education level: Not on file   Tobacco Use   • Smoking status: Current Every Day Smoker     Packs/day: 2.00     Years: 41.00     Pack years: 82.00     Types: Cigarettes   • Smokeless tobacco: Never Used   Vaping Use   • Vaping Use: Never used   Substance and Sexual Activity   • Alcohol use: No   • Drug use: No   • Sexual activity: Defer           Objective   Physical Exam  Vital signs are reviewed.  General appearance-patient is a well-developed and well-nourished.  Patient is in no acute distress.  Head is normocephalic, atraumatic.  Pupils are equal, round, reactive to light.  Extraocular muscles are intact.  Conjunctive is clear  Tympanic membranes are gray, intact without erythema or retractions.  Oral mucosa is pink and moist without lesions or erythema.  Uvula is midline.  Neck was supple.  Trachea was midline.  There is no palpable lymphadenopathy or thyromegaly.  Lungs are diminished in the bases.  Heart is tachycardic with a a regular rhythm without any murmurs, clicks, or gallops.  Abdomen soft.  Bowel sounds are present.  There is no rebound, guarding, or rigidity.  There is no palpable masses.  There is no pulsatile masses.  Back-spine is straight midline and midline.  There is no CVA tenderness.  Extremities were intact x4 with full range of motion.  There is no palpable edema.  Neurologic-cranial nerves II through XII are grossly intact.  Motor and sensory functions grossly intact.  Cerebellar function was normal.    Integument-lower extremities bilaterally have marked warmth to the touch.  There is almost a reddish/purplish discoloration.  There is numerous ulcerative lesions of  varying depths.  There is mucopurulent drainage from several of these lesions.  The left leg seems worse than the right.  There is no petechia or purpura lesions noted.  There is no vesicular lesions noted.    Procedures           ED Course         1838-patient was seen by me in the ED by me.  Above history and physical examination was performed as stated above.  Diagnostic data is obtained.  Results reviewed.  Patient was treated for sepsis.  Patient will require admission.  Hospital service was consulted for admission.                              Sepsis criteria was met in the emergency department and the Sepsis protocol (including antibiotic administration) was initiated.      SIRS criteria considered:   1.  Temperature > 100.4 or <98.6    2.  Heart Rate > 90    3.  Respiratory Rate > 22    4.  WBC > 12K or <4K.             Severe Sepsis:     Respiratory: Mechanical Ventilation or Bipap  Hypotension: SBP > 90 or MAP < 65  Renal: Creatinine > 2  Metabolic: Lactic Acid > 2  Hematologic: Platelets < 100K or INR > 1.5  Hepatic: BILI  >  2  CNS: Sudden AMS     Septic Shock:     Severe Sepsis + Persistent hypotension or Lactic Acid > 4     Normal saline bolus, Antibiotics, and final disposition was based on these definitions.        Sepsis was recognized at 1713    Antibiotics were ordered.       30 cc/kg bolus was indicated.           MDM.sep    Final diagnoses:   Acute febrile illness   Sepsis, due to unspecified organism, unspecified whether acute organ dysfunction present (CMS/[facility])   Acute UTI   Cellulitis of lower extremity, unspecified laterality       ED Disposition  ED Disposition     ED Disposition Condition Comment    Decision to Admit            No follow-up provider specified.       Medication List      No changes were made to your prescriptions during this visit.          Robbie Langston DO  09/06/21 9825

## 2021-09-07 LAB
ANION GAP SERPL CALCULATED.3IONS-SCNC: 9.7 MMOL/L (ref 5–15)
BUN SERPL-MCNC: 9 MG/DL (ref 6–20)
BUN/CREAT SERPL: 15.8 (ref 7–25)
CALCIUM SPEC-SCNC: 8.6 MG/DL (ref 8.6–10.5)
CHLORIDE SERPL-SCNC: 101 MMOL/L (ref 98–107)
CO2 SERPL-SCNC: 27.3 MMOL/L (ref 22–29)
CREAT SERPL-MCNC: 0.57 MG/DL (ref 0.57–1)
CRP SERPL-MCNC: 4.58 MG/DL (ref 0–0.5)
DEPRECATED RDW RBC AUTO: 47.2 FL (ref 37–54)
ERYTHROCYTE [DISTWIDTH] IN BLOOD BY AUTOMATED COUNT: 15.8 % (ref 12.3–15.4)
ERYTHROCYTE [SEDIMENTATION RATE] IN BLOOD: 41 MM/HR (ref 0–30)
GFR SERPL CREATININE-BSD FRML MDRD: 110 ML/MIN/1.73
GLUCOSE BLDC GLUCOMTR-MCNC: 128 MG/DL (ref 70–99)
GLUCOSE BLDC GLUCOMTR-MCNC: 128 MG/DL (ref 70–99)
GLUCOSE BLDC GLUCOMTR-MCNC: 166 MG/DL (ref 70–99)
GLUCOSE SERPL-MCNC: 134 MG/DL (ref 65–99)
HCT VFR BLD AUTO: 38.7 % (ref 34–46.6)
HGB BLD-MCNC: 11.9 G/DL (ref 12–15.9)
MCH RBC QN AUTO: 25.8 PG (ref 26.6–33)
MCHC RBC AUTO-ENTMCNC: 30.7 G/DL (ref 31.5–35.7)
MCV RBC AUTO: 83.9 FL (ref 79–97)
PLATELET # BLD AUTO: 138 10*3/MM3 (ref 140–450)
PMV BLD AUTO: 11.5 FL (ref 6–12)
POTASSIUM SERPL-SCNC: 3.6 MMOL/L (ref 3.5–5.2)
RBC # BLD AUTO: 4.61 10*6/MM3 (ref 3.77–5.28)
SARS-COV-2 N GENE RESP QL NAA+PROBE: NOT DETECTED
SODIUM SERPL-SCNC: 138 MMOL/L (ref 136–145)
WBC # BLD AUTO: 5.22 10*3/MM3 (ref 3.4–10.8)

## 2021-09-07 PROCEDURE — 80048 BASIC METABOLIC PNL TOTAL CA: CPT | Performed by: STUDENT IN AN ORGANIZED HEALTH CARE EDUCATION/TRAINING PROGRAM

## 2021-09-07 PROCEDURE — 25010000002 ENOXAPARIN PER 10 MG: Performed by: STUDENT IN AN ORGANIZED HEALTH CARE EDUCATION/TRAINING PROGRAM

## 2021-09-07 PROCEDURE — 82962 GLUCOSE BLOOD TEST: CPT

## 2021-09-07 PROCEDURE — 94799 UNLISTED PULMONARY SVC/PX: CPT

## 2021-09-07 PROCEDURE — 85027 COMPLETE CBC AUTOMATED: CPT | Performed by: STUDENT IN AN ORGANIZED HEALTH CARE EDUCATION/TRAINING PROGRAM

## 2021-09-07 PROCEDURE — 63710000001 INSULIN LISPRO (HUMAN) PER 5 UNITS: Performed by: STUDENT IN AN ORGANIZED HEALTH CARE EDUCATION/TRAINING PROGRAM

## 2021-09-07 PROCEDURE — 94760 N-INVAS EAR/PLS OXIMETRY 1: CPT

## 2021-09-07 PROCEDURE — 99233 SBSQ HOSP IP/OBS HIGH 50: CPT | Performed by: INTERNAL MEDICINE

## 2021-09-07 PROCEDURE — 25010000002 VANCOMYCIN 5 G RECONSTITUTED SOLUTION: Performed by: STUDENT IN AN ORGANIZED HEALTH CARE EDUCATION/TRAINING PROGRAM

## 2021-09-07 RX ORDER — NICOTINE POLACRILEX 4 MG
15 LOZENGE BUCCAL
Status: DISCONTINUED | OUTPATIENT
Start: 2021-09-07 | End: 2021-09-08 | Stop reason: HOSPADM

## 2021-09-07 RX ORDER — PETROLATUM,WHITE
OINTMENT IN PACKET (GRAM) TOPICAL 2 TIMES DAILY
Status: DISCONTINUED | OUTPATIENT
Start: 2021-09-07 | End: 2021-09-08 | Stop reason: HOSPADM

## 2021-09-07 RX ORDER — PETROLATUM,WHITE
OINTMENT IN PACKET (GRAM) TOPICAL 2 TIMES DAILY
Status: DISCONTINUED | OUTPATIENT
Start: 2021-09-07 | End: 2021-09-07

## 2021-09-07 RX ORDER — HYDROXYZINE HYDROCHLORIDE 25 MG/1
50 TABLET, FILM COATED ORAL 3 TIMES DAILY PRN
Status: DISCONTINUED | OUTPATIENT
Start: 2021-09-07 | End: 2021-09-08 | Stop reason: HOSPADM

## 2021-09-07 RX ORDER — BUDESONIDE 0.5 MG/2ML
0.5 INHALANT ORAL
Status: DISCONTINUED | OUTPATIENT
Start: 2021-09-07 | End: 2021-09-08 | Stop reason: HOSPADM

## 2021-09-07 RX ORDER — ONDANSETRON 4 MG/1
4 TABLET, FILM COATED ORAL EVERY 8 HOURS PRN
Status: DISCONTINUED | OUTPATIENT
Start: 2021-09-07 | End: 2021-09-08 | Stop reason: HOSPADM

## 2021-09-07 RX ORDER — GABAPENTIN 300 MG/1
300 CAPSULE ORAL 3 TIMES DAILY
Status: DISCONTINUED | OUTPATIENT
Start: 2021-09-07 | End: 2021-09-08 | Stop reason: HOSPADM

## 2021-09-07 RX ORDER — CLOPIDOGREL BISULFATE 75 MG/1
75 TABLET ORAL DAILY
Status: DISCONTINUED | OUTPATIENT
Start: 2021-09-07 | End: 2021-09-08 | Stop reason: HOSPADM

## 2021-09-07 RX ORDER — ALPRAZOLAM 0.25 MG/1
0.25 TABLET ORAL 2 TIMES DAILY PRN
Status: DISCONTINUED | OUTPATIENT
Start: 2021-09-07 | End: 2021-09-08 | Stop reason: HOSPADM

## 2021-09-07 RX ORDER — DEXTROSE MONOHYDRATE 100 MG/ML
25 INJECTION, SOLUTION INTRAVENOUS
Status: DISCONTINUED | OUTPATIENT
Start: 2021-09-07 | End: 2021-09-08 | Stop reason: HOSPADM

## 2021-09-07 RX ORDER — SODIUM CHLORIDE 9 MG/ML
125 INJECTION, SOLUTION INTRAVENOUS CONTINUOUS
Status: DISCONTINUED | OUTPATIENT
Start: 2021-09-07 | End: 2021-09-07

## 2021-09-07 RX ORDER — NITROGLYCERIN 0.4 MG/1
0.4 TABLET SUBLINGUAL
Status: DISCONTINUED | OUTPATIENT
Start: 2021-09-07 | End: 2021-09-08 | Stop reason: HOSPADM

## 2021-09-07 RX ORDER — SODIUM CHLORIDE 0.9 % (FLUSH) 0.9 %
10 SYRINGE (ML) INJECTION AS NEEDED
Status: DISCONTINUED | OUTPATIENT
Start: 2021-09-07 | End: 2021-09-08 | Stop reason: HOSPADM

## 2021-09-07 RX ORDER — GABAPENTIN 300 MG/1
300 CAPSULE ORAL 3 TIMES DAILY
Status: DISCONTINUED | OUTPATIENT
Start: 2021-09-07 | End: 2021-09-07

## 2021-09-07 RX ORDER — HYDROXYZINE HYDROCHLORIDE 25 MG/1
50 TABLET, FILM COATED ORAL 4 TIMES DAILY
Status: DISCONTINUED | OUTPATIENT
Start: 2021-09-07 | End: 2021-09-07

## 2021-09-07 RX ORDER — SODIUM CHLORIDE 0.9 % (FLUSH) 0.9 %
10 SYRINGE (ML) INJECTION EVERY 12 HOURS SCHEDULED
Status: DISCONTINUED | OUTPATIENT
Start: 2021-09-07 | End: 2021-09-08 | Stop reason: HOSPADM

## 2021-09-07 RX ORDER — ALBUTEROL SULFATE 90 UG/1
2 AEROSOL, METERED RESPIRATORY (INHALATION) EVERY 4 HOURS PRN
Status: DISCONTINUED | OUTPATIENT
Start: 2021-09-07 | End: 2021-09-08 | Stop reason: HOSPADM

## 2021-09-07 RX ADMIN — VANCOMYCIN HYDROCHLORIDE 1250 MG: 5 INJECTION, POWDER, LYOPHILIZED, FOR SOLUTION INTRAVENOUS at 22:59

## 2021-09-07 RX ADMIN — GABAPENTIN 300 MG: 300 CAPSULE ORAL at 16:45

## 2021-09-07 RX ADMIN — SODIUM CHLORIDE, PRESERVATIVE FREE 10 ML: 5 INJECTION INTRAVENOUS at 20:31

## 2021-09-07 RX ADMIN — VANCOMYCIN HYDROCHLORIDE 1500 MG: 5 INJECTION, POWDER, LYOPHILIZED, FOR SOLUTION INTRAVENOUS at 10:53

## 2021-09-07 RX ADMIN — WHITE PETROLATUM: 1 JELLY TOPICAL at 20:30

## 2021-09-07 RX ADMIN — BUDESONIDE 0.5 MG: 0.5 INHALANT RESPIRATORY (INHALATION) at 20:47

## 2021-09-07 RX ADMIN — GABAPENTIN 300 MG: 300 CAPSULE ORAL at 10:53

## 2021-09-07 RX ADMIN — GABAPENTIN 300 MG: 300 CAPSULE ORAL at 20:30

## 2021-09-07 RX ADMIN — SODIUM CHLORIDE, PRESERVATIVE FREE 10 ML: 5 INJECTION INTRAVENOUS at 10:19

## 2021-09-07 RX ADMIN — ALPRAZOLAM 0.25 MG: 0.25 TABLET ORAL at 10:53

## 2021-09-07 RX ADMIN — CLOPIDOGREL BISULFATE 75 MG: 75 TABLET ORAL at 09:15

## 2021-09-07 RX ADMIN — INSULIN LISPRO 2 UNITS: 100 INJECTION, SOLUTION INTRAVENOUS; SUBCUTANEOUS at 13:02

## 2021-09-07 RX ADMIN — ALPRAZOLAM 0.25 MG: 0.25 TABLET ORAL at 23:00

## 2021-09-07 RX ADMIN — ENOXAPARIN SODIUM 40 MG: 40 INJECTION SUBCUTANEOUS at 09:15

## 2021-09-07 NOTE — SIGNIFICANT NOTE
Patient has been seen at [facility] in past but when asked about mupirocin and aquaphor she denies ever using, tons of allergies noted and reported by patient, she even stated she couldn't use aquacel silver dressings, I did verify with her the purple bathing wipes and she said those were fine as she reports using those in past and had no issues, I washed her from knees to toes on BLE, left open to air at this time until verify topical care with MD, no areas are weeping but skin is very dry, pink scarring noted to BLE, patient voices tenderness to BLE slight redness noted to BLE more on right vs left     Will discuss with MD and make plan, she stated she does use ace wraps at home and stated she was fine with resuming those here        Will address ace wraps once JULIANNE/arterial studies are resulted

## 2021-09-07 NOTE — PROGRESS NOTES
[facility]   Hospitalist Progress Note  Date: 2021  Patient Name: Isha Llanes  : 1965  MRN: [id number]  Date of admission: 2021      Subjective   Subjective     Chief Complaint:   Fever, bilateral lower extremity with pain and edema    Summary:   Isha Llanes is a 55 y.o. female with history of venous insufficiency of lower extremity, type 2 diabetes, back pain with intrathecal pump, COPD, chronic pain and nicotine dependence who presented to the ED with high fever, dysuria and pain and swelling of lower extremities.  Patient states her skin changes to her lower extremities are chronic issue, however over the past couple of weeks it has been slowly worsening.  At home she had been noting elevated temperatures.  In the emergency department patient was febrile to 102.4, tachycardic 120s.  Patient was admitted to the hospital for treatment of bilateral lower extremities with cellulitis, possible urinary tract infection    Interval Followup:   Antibiotics have been narrowed down to vancomycin.  Wound care has seen patient recommendations in chart, appreciate assistance  Patient complaining of worse pain.  Patient also complaining of anxiety will have low-dose Xanax available for her.  Patient will need to follow-up closely with wound care once discharged    Review of Systems   All systems were reviewed and negative except for: Anxiety, bilateral lower extremity pain    Objective   Objective     Vitals:   Temp:  [97.7 °F (36.5 °C)-99.9 °F (37.7 °C)] 98.2 °F (36.8 °C)  Heart Rate:  [] 89  Resp:  [18-23] 18  BP: (100-115)/(48-84) 103/68  Flow (L/min):  [2] 2  Physical Exam    Constitutional: Awake, alert, anxious   Eyes: Pupils equal, sclerae anicteric, no conjunctival injection   HENT: NCAT, mucous membranes moist   Neck: Supple, no thyromegaly, no lymphadenopathy, trachea midline   Respiratory: Clear to auscultation bilaterally, nonlabored respirations    Cardiovascular:  Tachycardic with regular rhythm, no murmurs, rubs, or gallops, palpable pedal pulses bilaterally   Gastrointestinal: Positive bowel sounds, soft, nontender, nondistended   Musculoskeletal: No bilateral ankle edema, no clubbing or cyanosis to extremities   Neurologic: Oriented x 3, strength symmetric in all extremities, Cranial Nerves grossly intact to confrontation, speech clear   Skin: Lateral lower extremities erythematous, warm to the touch, trace edema.  Several ulcerations, no fluctuance palpated    Result Review    Result Review:  I have personally reviewed the results from the time of this admission to 9/7/2021 17:07 EDT and agree with these findings:  [x]  Laboratory  [x]  Microbiology  [x]  Radiology  []  EKG/Telemetry   [x]  Cardiology/Vascular   []  Pathology  [x]  Old records  []  Other:    Assessment/Plan   Assessment / Plan     Assessment/Plan:  Sepsis on admission, tachycardia, febrile.  Suspected source urinary tract infection versus cellulitis  Cellulitis  Urinary tract infection  Diabetes  Venous insufficiency  Back pain with intrathecal pump  COPD not exacerbation  Chronic pain  Nicotine dependence  Anxiety    Plan:  Patient remains admitted to the hospital for further care and management  Antibiotics have been changed over to IV Comycin  Therapeutic drug monitoring while on vancomycin  We will follow up all cultures  Discontinue continuous IV fluids  Wound care consult placed  As needed Xanax is available     Discussed plan with RN, social work, wound care    DVT prophylaxis:  Medical DVT prophylaxis orders are present.    CODE STATUS:   Level Of Support Discussed With: Patient  Code Status: CPR  Medical Interventions (Level of Support Prior to Arrest): Full        Electronically signed by Hardeep Rose MD, 09/07/21, 5:07 PM EDT.

## 2021-09-07 NOTE — PLAN OF CARE
Goal Outcome Evaluation:  Plan of Care Reviewed With: patient        Progress: no change  Outcome Summary: New admission. Will not stay awake long enough to answer questions.me

## 2021-09-07 NOTE — PLAN OF CARE
Goal Outcome Evaluation:               Pt is alert and oriented. Wound has seen her today and is going to communicate with [place]. Pt is very anxious has xanax on board for that. Pt has to be straight cath today for urinary retention.

## 2021-09-07 NOTE — PROGRESS NOTES
"PHARMACY TO DOSE VANCOMYCIN DAY: 1  DURATION OF THERAPY: 9-12-21    INDICATION: SEPSIS  GOAL AUC: 400-600 MG/L.HR    55 y.o. female with history of venous insufficiency of lower extremity, nodular sclerosing Hodgkin's lymphoma, type 2 diabetes, back pain, COPD and nicotine dependence who presented to the ED with high fever, dysuria and cellulitis of the lower extremities.  Patient stated that she does not like to visit the hospital so she has been sick at home for the past 3 days.  Patient stated that she felt like she is going to be in septic shock because she had experienced that before that we made her come to the hospital.  She felt her heart racing and felt dizzy with a high fever at home.  She said that her skin changes have been there but they are getting more erythematous and more tender recently patient states that she has dysuria been experiencing for the past 1 to 2 days.  Patient has not been on recent antibiotics.  In the ED she had a temperature of 102.4 with a heart rate in the 120s and stable blood pressure lactate was 2.  Patient was given meropenem and vancomycin in the ED and 30 cc/kg for fluid bolus.  Patient was very drowsy when I spoke to her and she said that she has a Dilaudid pump in her back for back pain. Urinalysis shows red blood cells elevated WBC and positive for bacteria.    HT: 165.1 cm (65\")      09/07/21  0239      Weight: 76 kg (167 lb 8.8 oz)        Estimated Creatinine Clearance: 102.9 mL/min (by C-G formula based on SCr of 0.63 mg/dL).  HD/CRRT/PD? NO  CONTRAST ADMINISTERED? NO  I/O last 3 completed shifts:  In: 2398 [IV Piggyback:2398]  Out: -     WBC: 7.17 (9-6)  TMAX: 102.4    Microbiology Results (last 10 days)       ** No results found for the last 240 hours. **          09/06/21 1755    XR Chest   No acute cardiopulmonary disease    Other antimicrobial therapy: meropenem    ASSESSMENT / PLAN:  Vancomycin 1500 mg ordered for last night but is not documented as being " administered.    Per [facility]    A loading dose: 1500 mg at 09:00 09/07/2021 followed by 1250 mg IV every 12 hours should provide:    AUC24,ss: 550 mg/L.hr  PAUC*: 80 %  Ctrough,ss: 16.5 mg/L  Pconc*: 35 %  Tox.: 12 %    Will start this regimen and check a random level in a.m.  Labs ordered: BMP ordered for a.m.

## 2021-09-08 ENCOUNTER — READMISSION MANAGEMENT (OUTPATIENT)
Dept: CALL CENTER | Facility: HOSPITAL | Age: 56
End: 2021-09-08

## 2021-09-08 VITALS
RESPIRATION RATE: 18 BRPM | WEIGHT: 167.55 LBS | TEMPERATURE: 97.9 F | DIASTOLIC BLOOD PRESSURE: 62 MMHG | SYSTOLIC BLOOD PRESSURE: 103 MMHG | HEIGHT: 65 IN | OXYGEN SATURATION: 100 % | BODY MASS INDEX: 27.92 KG/M2 | HEART RATE: 85 BPM

## 2021-09-08 LAB
ANION GAP SERPL CALCULATED.3IONS-SCNC: 6.7 MMOL/L (ref 5–15)
BACTERIA SPEC AEROBE CULT: ABNORMAL
BUN SERPL-MCNC: 11 MG/DL (ref 6–20)
BUN/CREAT SERPL: 22.4 (ref 7–25)
CALCIUM SPEC-SCNC: 8 MG/DL (ref 8.6–10.5)
CHLORIDE SERPL-SCNC: 102 MMOL/L (ref 98–107)
CO2 SERPL-SCNC: 27.3 MMOL/L (ref 22–29)
CREAT SERPL-MCNC: 0.49 MG/DL (ref 0.57–1)
DEPRECATED RDW RBC AUTO: 47.6 FL (ref 37–54)
ERYTHROCYTE [DISTWIDTH] IN BLOOD BY AUTOMATED COUNT: 15.6 % (ref 12.3–15.4)
GFR SERPL CREATININE-BSD FRML MDRD: 131 ML/MIN/1.73
GLUCOSE BLDC GLUCOMTR-MCNC: 137 MG/DL (ref 70–99)
GLUCOSE BLDC GLUCOMTR-MCNC: 155 MG/DL (ref 70–99)
GLUCOSE SERPL-MCNC: 137 MG/DL (ref 65–99)
HCT VFR BLD AUTO: 36.8 % (ref 34–46.6)
HGB BLD-MCNC: 11.4 G/DL (ref 12–15.9)
MAGNESIUM SERPL-MCNC: 1.8 MG/DL (ref 1.6–2.6)
MCH RBC QN AUTO: 26.1 PG (ref 26.6–33)
MCHC RBC AUTO-ENTMCNC: 31 G/DL (ref 31.5–35.7)
MCV RBC AUTO: 84.2 FL (ref 79–97)
PLATELET # BLD AUTO: 123 10*3/MM3 (ref 140–450)
PMV BLD AUTO: 10.5 FL (ref 6–12)
POTASSIUM SERPL-SCNC: 3.8 MMOL/L (ref 3.5–5.2)
RBC # BLD AUTO: 4.37 10*6/MM3 (ref 3.77–5.28)
SODIUM SERPL-SCNC: 136 MMOL/L (ref 136–145)
VANCOMYCIN SERPL-MCNC: 12.55 MCG/ML (ref 5–40)
WBC # BLD AUTO: 4.66 10*3/MM3 (ref 3.4–10.8)

## 2021-09-08 PROCEDURE — 85027 COMPLETE CBC AUTOMATED: CPT | Performed by: INTERNAL MEDICINE

## 2021-09-08 PROCEDURE — 82962 GLUCOSE BLOOD TEST: CPT

## 2021-09-08 PROCEDURE — 80048 BASIC METABOLIC PNL TOTAL CA: CPT | Performed by: INTERNAL MEDICINE

## 2021-09-08 PROCEDURE — 25010000002 ENOXAPARIN PER 10 MG: Performed by: STUDENT IN AN ORGANIZED HEALTH CARE EDUCATION/TRAINING PROGRAM

## 2021-09-08 PROCEDURE — 83735 ASSAY OF MAGNESIUM: CPT | Performed by: INTERNAL MEDICINE

## 2021-09-08 PROCEDURE — 25010000002 HYDROMORPHONE 1 MG/ML SOLUTION: Performed by: GENERAL PRACTICE

## 2021-09-08 PROCEDURE — 25010000002 MEROPENEM PER 100 MG: Performed by: INTERNAL MEDICINE

## 2021-09-08 PROCEDURE — 99239 HOSP IP/OBS DSCHRG MGMT >30: CPT | Performed by: INTERNAL MEDICINE

## 2021-09-08 PROCEDURE — 80202 ASSAY OF VANCOMYCIN: CPT | Performed by: STUDENT IN AN ORGANIZED HEALTH CARE EDUCATION/TRAINING PROGRAM

## 2021-09-08 RX ORDER — LEVOFLOXACIN 500 MG/1
500 TABLET, FILM COATED ORAL DAILY
Qty: 10 TABLET | Refills: 0 | Status: SHIPPED | OUTPATIENT
Start: 2021-09-08 | End: 2021-09-18

## 2021-09-08 RX ADMIN — SODIUM CHLORIDE, PRESERVATIVE FREE 10 ML: 5 INJECTION INTRAVENOUS at 10:22

## 2021-09-08 RX ADMIN — ENOXAPARIN SODIUM 40 MG: 40 INJECTION SUBCUTANEOUS at 10:22

## 2021-09-08 RX ADMIN — ALPRAZOLAM 0.25 MG: 0.25 TABLET ORAL at 10:21

## 2021-09-08 RX ADMIN — WHITE PETROLATUM: 1 JELLY TOPICAL at 10:23

## 2021-09-08 RX ADMIN — GABAPENTIN 300 MG: 300 CAPSULE ORAL at 10:21

## 2021-09-08 RX ADMIN — MEROPENEM 500 MG: 500 INJECTION INTRAVENOUS at 10:21

## 2021-09-08 RX ADMIN — HYDROMORPHONE HYDROCHLORIDE 0.5 MG: 1 INJECTION, SOLUTION INTRAMUSCULAR; INTRAVENOUS; SUBCUTANEOUS at 00:56

## 2021-09-08 RX ADMIN — CLOPIDOGREL BISULFATE 75 MG: 75 TABLET ORAL at 10:21

## 2021-09-08 NOTE — PLAN OF CARE
Goal Outcome Evaluation:      Patient is having increased pain in her back. Given a one time dose of dilaudid. Vitals stable.

## 2021-09-08 NOTE — PROGRESS NOTES
"PHARMACY TO DOSE VANCOMYCIN DAY: 2  DURATION OF THERAPY: 9-12-21    INDICATION: SEPSIS  GOAL AUC: 400-600 MG/L.HR    HT: 165.1 cm (65\")      09/07/21  0239      Weight: 76 kg (167 lb 8.8 oz)        Estimated Creatinine Clearance: 132.3 mL/min (A) (by C-G formula based on SCr of 0.49 mg/dL (L)).  HD/CRRT/PD? NO  CONTRAST ADMINISTERED? NO  I/O last 3 completed shifts:  In: 4028 [P.O.:480; I.V.:900; IV Piggyback:2648]  Out: 250 [Urine:250]    WBC: 4.66  TMAX: 99    Microbiology Results (last 10 days)       Procedure Component Value - Date/Time    Blood Culture - Blood, Arm, Left [[id number]] Collected: 09/06/21 1858    Lab Status: Preliminary result Specimen: Blood from Arm, Left Updated: 09/07/21 1915     Blood Culture No growth at 24 hours    Blood Culture - Blood, Arm, Left [[id number]] Collected: 09/06/21 1749    Lab Status: Preliminary result Specimen: Blood from Arm, Left Updated: 09/07/21 1801     Blood Culture No growth at 24 hours    COVID-19, [facility] IN-HOUSE, NP SWAB IN TRANSPORT MEDIA 8-10 HR TAT - Swab, Nasopharynx [[id number]]  (Normal) Collected: 09/06/21 1739    Lab Status: Final result Specimen: Swab from Nasopharynx Updated: 09/07/21 1235     COVID19 Not Detected    Narrative:      Testing performed by Real Time RT-PCR  This test has not been approved by the USDA but is authorized under the Emergency Use Act (EUA)    Fact sheet for providers: https://www.fda.gov/media/798238/download    Fact sheet for patients: https://www.fda.gov/media/125611/download      Testing performed by Real Time RT-PCR  This test has not been approved by the USDA but is authorized under the Emergency Use Act (EUA)    Fact sheet for providers: https://www.fda.gov/media/863268/download    Fact sheet for patients: https://www.fda.gov/media/807501/download        Urine Culture - Urine, Urine, Clean Catch [[id number]]  (Abnormal) Collected: 09/06/21 0570    Lab Status: Preliminary result Specimen: Urine, Clean Catch Updated: " 09/07/21 1031     Urine Culture 25,000 CFU/mL Gram Negative Bacilli          09/06/21 1755    XR Chest   No acute cardiopulmonary disease    Other antimicrobial therapy: meropenem    ASSESSMENT / PLAN:  Lab Results   Component Value Date    QUINTON 14 05/14/2019    ROMANNDOM 12.55 09/08/2021     Current regimen: vancomycin 1250 mg q12hr    AUC24,ss: 437 mg/L.hr  PAUC*: 65 %  Ctrough,ss: 12.1 mg/L  Pconc*: 9 %  Tox.: 7 %    Will continue same regimen for now. Will check a trough level prior to tomorrow a.m.'s dose  Labs ordered: BMP to be drawn at the time the level is collected

## 2021-09-08 NOTE — DISCHARGE INSTR - APPOINTMENTS
[facility]  Friday, September 10th at 1:15 p.m.   Go to Main Entrance of the hospital   Go to Registration Desk to the right  Staff will give you further directions.     Social Work has arranged transportation through [employer] for Initial Appointment.

## 2021-09-08 NOTE — NURSING NOTE
"Patient refused a lot of medications today. The md requested she get her iv antibiotics prior to discharge. Patient refused them stating that would take too long and she needs to leave now. Patient refused afternoon dose of insulin. Patient then refused heprin flush for port deaccess stating \"im too anxious just rip it out of me. I dont have time for heparin ill get it flushed later\"  "

## 2021-09-08 NOTE — SIGNIFICANT NOTE
09/08/21 1130   Plan   Final Discharge Disposition Code 01 - home or self-care   Final Note Pt discharging home today. Pt will need [facility] for wound care. DEL called and scheduled initial appointment on September 10th at 1:15 p.m. DEL contacted [employer] and scheduled transportation as well. Confirmation ID to appointment: [id number] and confirmation ID from appointment: [id number]. DEL has provided all information to pt. Pt states she will have transportation to discharge home today.

## 2021-09-08 NOTE — OUTREACH NOTE
Prep Survey      Responses   [facility] facility patient discharged from?  Go   Is LACE score < 7 ?  No   Emergency Room discharge w/ pulse ox?  No   Eligibility  [facility]   Hospital  Go   Date of Admission  09/06/21   Date of Discharge  09/08/21   Discharge Disposition  Home or Self Care   Discharge diagnosis  Sepsis   Does the patient have one of the following disease processes/diagnoses(primary or secondary)?  Sepsis   Does the patient have Home health ordered?  No   Is there a DME ordered?  No   Prep survey completed?  Yes          Amalia Lynn RN

## 2021-09-09 ENCOUNTER — TRANSITIONAL CARE MANAGEMENT TELEPHONE ENCOUNTER (OUTPATIENT)
Dept: CALL CENTER | Facility: HOSPITAL | Age: 56
End: 2021-09-09

## 2021-09-09 NOTE — OUTREACH NOTE
Call Center TCM Note      Responses   [facility] patient discharged from?  Go   Does the patient have one of the following disease processes/diagnoses(primary or secondary)?  Sepsis   TCM attempt successful?  Yes   Call start time  1327   Revoked Reason  Patient/Family Refused   Call end time  1331   Discharge diagnosis  Sepsis   Meds reviewed with patient/caregiver?  Yes   Is the patient having any side effects they believe may be caused by any medication additions or changes?  No   Does the patient have all medications related to this admission filled (includes all antibiotics, inhalers, nebulizers,steroids,etc.)  Yes   Medication comments  Encouraged to complete antibiotics as ordered--pt has had some diarrhea    Comments regarding PCP  Hospital D/C follow-up scheduled for 9/15/21@1015am    Does the patient have an appointment with their PCP within 7 days of discharge?  Yes   Comments  Patient communicated she has switched PCP's and will be following up with a PCP in [place] but declined to provide name--this RN inquired if this appt needs to be cancelled and she hung up    TCM call completed?  Yes          Dionne Mccann RN    9/9/2021, 13:32 EDT

## 2021-09-10 ENCOUNTER — HOSPITAL ENCOUNTER (OUTPATIENT)
Dept: INFUSION THERAPY | Facility: HOSPITAL | Age: 56
Discharge: HOME | End: 2021-09-10
Admitting: INTERNAL MEDICINE

## 2021-09-10 NOTE — SIGNIFICANT NOTE
09/10/21 1331   Wound 09/10/21 1331 Bilateral lower leg   Placement Date/Time: 09/10/21 1331   Present on Hospital Admission: Yes  Side: Bilateral  Orientation: lower  Location: leg   Wound Image       Base pink   Periwound pink;edematous   Drainage Characteristics/Odor serosanguineous   Drainage Amount scant   Care, Wound cleansed with;sterile normal saline   Dressing Care dressing applied;silver impregnated;foam;gauze;elastic bandage   Patient with BLE ulcerations with PVD.  Pt states she has had this for the last 2 years.  Pt states she typically uses kerlex and ace wraps at home but ran out of her supplies.  Pt states she used to see the [facility] but has not been in some time.  Recommend pt follow up with [facility] for chronic ulcerations and PVD for monitoring if antibiotics needed in future.  At this time plan for pt to have daily wound care in [facility] with use of setopress compression.  Pt verbalizes understanding and to make appointments.  Gerard,rn,[facility]

## 2021-09-11 LAB
BACTERIA SPEC AEROBE CULT: NORMAL
BACTERIA SPEC AEROBE CULT: NORMAL

## 2021-09-13 ENCOUNTER — TRANSCRIBE ORDERS (OUTPATIENT)
Dept: ADMINISTRATIVE | Facility: HOSPITAL | Age: 56
End: 2021-09-13

## 2021-09-13 ENCOUNTER — HOSPITAL ENCOUNTER (OUTPATIENT)
Dept: INFUSION THERAPY | Facility: HOSPITAL | Age: 56
Setting detail: INFUSION SERIES
Discharge: HOME | End: 2021-09-13

## 2021-09-13 VITALS
HEART RATE: 88 BPM | SYSTOLIC BLOOD PRESSURE: 100 MMHG | RESPIRATION RATE: 20 BRPM | TEMPERATURE: 98.5 F | OXYGEN SATURATION: 93 % | DIASTOLIC BLOOD PRESSURE: 66 MMHG

## 2021-09-13 DIAGNOSIS — I87.303 VENOUS HYPERTENSION OF BOTH LOWER EXTREMITIES: ICD-10-CM

## 2021-09-13 DIAGNOSIS — E11.622 TYPE 2 DIABETES MELLITUS WITH OTHER SKIN ULCER, UNSPECIFIED WHETHER LONG TERM INSULIN USE (HCC): ICD-10-CM

## 2021-09-13 DIAGNOSIS — S81.809D NON-HEALING WOUND OF LOWER EXTREMITY, SUBSEQUENT ENCOUNTER: Primary | ICD-10-CM

## 2021-09-13 DIAGNOSIS — L98.499 TYPE 2 DIABETES MELLITUS WITH OTHER SKIN ULCER, UNSPECIFIED WHETHER LONG TERM INSULIN USE (HCC): ICD-10-CM

## 2021-09-13 LAB — QT INTERVAL: 346 MS

## 2021-09-13 PROCEDURE — G0463 HOSPITAL OUTPT CLINIC VISIT: HCPCS

## 2021-09-13 NOTE — CODE DOCUMENTATION
Patient stated that she did not come over the weekend because we were closed.  I informed her that we are open on Saturday and Sunday also.  I instructed patient that she will need to call and make her appointments.      Jerson Hensley RN

## 2021-09-20 ENCOUNTER — READMISSION MANAGEMENT (OUTPATIENT)
Dept: CALL CENTER | Facility: HOSPITAL | Age: 56
End: 2021-09-20

## 2021-09-20 NOTE — OUTREACH NOTE
Sepsis Week 2 Survey      Responses   [facility] patient discharged from?  Go   Does the patient have one of the following disease processes/diagnoses(primary or secondary)?  Sepsis   Week 2 attempt successful?  No   Unsuccessful attempts  Attempt 1          Dimple Kam RN

## 2021-09-29 ENCOUNTER — OFFICE VISIT (OUTPATIENT)
Dept: INTERNAL MEDICINE | Facility: CLINIC | Age: 56
End: 2021-09-29

## 2021-09-29 ENCOUNTER — READMISSION MANAGEMENT (OUTPATIENT)
Dept: CALL CENTER | Facility: HOSPITAL | Age: 56
End: 2021-09-29

## 2021-09-29 VITALS
DIASTOLIC BLOOD PRESSURE: 68 MMHG | TEMPERATURE: 97.5 F | OXYGEN SATURATION: 96 % | RESPIRATION RATE: 16 BRPM | HEART RATE: 99 BPM | HEIGHT: 65 IN | BODY MASS INDEX: 27.66 KG/M2 | SYSTOLIC BLOOD PRESSURE: 128 MMHG | WEIGHT: 166 LBS

## 2021-09-29 DIAGNOSIS — E11.65 TYPE 2 DIABETES MELLITUS WITH HYPERGLYCEMIA, WITH LONG-TERM CURRENT USE OF INSULIN (HCC): ICD-10-CM

## 2021-09-29 DIAGNOSIS — L03.90 CELLULITIS, UNSPECIFIED CELLULITIS SITE: ICD-10-CM

## 2021-09-29 DIAGNOSIS — S90.424A BLISTER OF TOE OF RIGHT FOOT WITHOUT INFECTION, INITIAL ENCOUNTER: ICD-10-CM

## 2021-09-29 DIAGNOSIS — A41.9 SEPSIS WITHOUT ACUTE ORGAN DYSFUNCTION, DUE TO UNSPECIFIED ORGANISM (HCC): Primary | ICD-10-CM

## 2021-09-29 DIAGNOSIS — Z20.822 CLOSE EXPOSURE TO COVID-19 VIRUS: ICD-10-CM

## 2021-09-29 DIAGNOSIS — Z79.4 TYPE 2 DIABETES MELLITUS WITH HYPERGLYCEMIA, WITH LONG-TERM CURRENT USE OF INSULIN (HCC): ICD-10-CM

## 2021-09-29 DIAGNOSIS — N39.0 URINARY TRACT INFECTION WITHOUT HEMATURIA, SITE UNSPECIFIED: ICD-10-CM

## 2021-09-29 PROCEDURE — 99214 OFFICE O/P EST MOD 30 MIN: CPT | Performed by: PHYSICIAN ASSISTANT

## 2021-09-29 RX ORDER — CLOPIDOGREL BISULFATE 75 MG/1
75 TABLET ORAL DAILY
Qty: 30 TABLET | Refills: 0 | Status: SHIPPED | OUTPATIENT
Start: 2021-09-29 | End: 2022-03-25 | Stop reason: SDUPTHER

## 2021-09-29 NOTE — OUTREACH NOTE
Sepsis Week 4 Survey      Responses   [facility] patient discharged from?  Go   Does the patient have one of the following disease processes/diagnoses(primary or secondary)?  Sepsis   Week 4 attempt successful?  No          Anaid Kumar RN

## 2021-09-29 NOTE — PROGRESS NOTES
Chief Complaint  Leg Pain    Subjective          Isha Llanes presents to [facility]  Patient admitted 9-6 to [facility] for sepsis.  Fever 102 in the emergency room.  She was treated for UTI and cellulitis.  She asked to be discharged on 9-8. Discharged home on Levaquin.  Pt states she is still having some dysuria but that she cannot leave a urine sample.  Denies blood in urine.  Denies fever at home    Grandson who lives in the house has a fever and could have been exposed to covid    DM: bg running 200s. She has seen endocrine and they changed her insulin  She states she refuses pump because it is ''too much''  She states the short term insulin brings it down when it decreases    Venous stasis: leg ulcers improved  She has apt 10/5 with wound care per pt.    She noticed a blister on R great toe.   Denies odor.      Past Medical History:   Diagnosis Date   • Anxiety     NO CURRENT MEDICATION   • Aortic stenosis, severe    • Arthritis    • Asthma    • Back pain    • Blood clotting disorder (CMS/HCC)    • Cancer associated pain    • Chronic low back pain with sciatica    • Diabetes mellitus (CMS/HCC)     TYPE 2   • Dyspnea on effort    • GERD (gastroesophageal reflux disease)    • Hiatal hernia    • History of degenerative disc disease    • History of kidney stones    • History of pulmonary embolus (PE)    • History of transfusion    • Hodgkin's lymphoma (CMS/HCC)    • Immobility    • Lupus (CMS/HCC)    • Mitral valve prolapse    • Pelvic pain    • Peripheral neuropathy    • Personal history of DVT (deep vein thrombosis)    • Presence of intrathecal pump    • Sacroiliac joint disease    • SI (sacroiliac) joint inflammation (CMS/HCC)    • Venous insufficiency         Past Surgical History:   Procedure Laterality Date   • BACK SURGERY      SPINAL FUSION    • BLADDER REPAIR     • CARDIAC CATHETERIZATION N/A 3/6/2019    Procedure: Valvuloplasty;  Surgeon: Elizabeth  Wayne MOLINA MD;  Location: [facility] INVASIVE LOCATION;  Service: Cardiology   • CARDIAC VALVE REPLACEMENT  2021   • CHOLECYSTECTOMY     • HYSTERECTOMY     • LYMPHADENECTOMY     • PACEMAKER IMPLANTATION     • PAIN PUMP INSERTION/REVISION N/A 10/18/2019    Procedure: PAIN PUMP INSERTION [facility] 10-18-19 [place];  Surgeon: Horace Rios MD;  Location: [facility] MAIN OR;  Service: Pain Management   • PAIN PUMP INSERTION/REVISION N/A 11/23/2020    Procedure: pain pump removal;  Surgeon: Horace Rios MD;  Location: [facility] OR;  Service: Pain Management;  Laterality: N/A;   • TONSILLECTOMY     • TUBAL ABDOMINAL LIGATION     • TUMOR REMOVAL          Current Outpatient Medications on File Prior to Visit   Medication Sig Dispense Refill   • acetaminophen (TYLENOL) 500 MG tablet Take 1,000-1,500 mg by mouth Every 6 (Six) Hours As Needed for Mild Pain .     • albuterol (PROVENTIL HFA;VENTOLIN HFA) 108 (90 Base) MCG/ACT inhaler Inhale 2 puffs Every 4 (Four) Hours As Needed for Wheezing.     • albuterol sulfate  (90 Base) MCG/ACT inhaler Inhale 1 puff Every 4 (Four) Hours As Needed.     • Elastic Bandages & Supports (ACE Bandage Self-Adhering) misc 1 each Daily. 60 each 0   • Flovent  MCG/ACT inhaler inhale 2 puffs by mouth twice daily (Patient taking differently: Inhale 1 puff 2 (Two) Times a Day.) 12 g 2   • furosemide (LASIX) 40 MG tablet Take 1 tablet by mouth Daily. 90 tablet 1   • gabapentin (NEURONTIN) 300 MG capsule TAKE ONE CAPSULE BY MOUTH THREE TIMES DAILY (Patient taking differently: Take 300 mg by mouth 3 (Three) Times a Day.) 270 capsule 1   • HYDROmorphone (DILAUDID) 1 mg/mL solution Infuse  into a venous catheter Dose Adjusted By Provider As Needed. PER PAIN PUMP     • hydrOXYzine (ATARAX) 50 MG tablet Take 50 mg by mouth 4 (Four) Times a Day.     • Insulin Lispro (ADMELOG SOLOSTAR) 100 UNIT/ML injection pen Inject 12 Units under the skin into the appropriate area as directed  Daily. SLIDING SCALE R/T BS. Takes 2-12 units tid     • ipratropium-albuterol (DUO-NEB) 0.5-2.5 mg/3 ml nebulizer Inhale 1 ampule.     • ondansetron (Zofran) 4 MG tablet Take 1 tablet by mouth Every 8 (Eight) Hours As Needed for Nausea or Vomiting. 8 tablet 0   • sodium chloride 0.9 % solution with HYDROmorphone (PF) 50 MG/5ML solution 0.2 mg/mL Infuse 0-2 mg into a venous catheter Every 1 (One) Hour As Needed for Pain.     • Sodium Sulfate-Mag Sulfate-KCl (Sutab) 9983-124-948 MG tablet Take 188 mg by mouth Continuous. 24 tablet 0   • triamcinolone (KENALOG) 0.1 % ointment Apply  topically to the appropriate area as directed 2 (Two) Times a Day. For one week for venous stasis dermatitis. Do not apply to open wounds (Patient taking differently: Apply 1 application topically to the appropriate area as directed 2 (Two) Times a Day. For one week for venous stasis dermatitis. Do not apply to open wounds) 15 g 0     Current Facility-Administered Medications on File Prior to Visit   Medication Dose Route Frequency Provider Last Rate Last Admin   • sterile water irrigation solution 500 mL  500 mL Irrigation Once Damari Cornejo, KALPESH            Allergies   Allergen Reactions   • Amoxicillin Shortness Of Breath   • Ceclor [Cefaclor] Shortness Of Breath   • Penicillins Shortness Of Breath   • Sulfa Antibiotics Rash   • Valium [Diazepam] Mental Status Change   • Ambien [Zolpidem] Unknown - Low Severity   • Aspirin GI Intolerance   • Ativan [Lorazepam] Unknown - Low Severity   • Benadryl [Diphenhydramine] Unknown - Low Severity   • Biaxin [Clarithromycin] Unknown - Low Severity   • Cefazolin Unknown - Low Severity   • Cephalexin Unknown - Low Severity   • Cephalosporins Unknown - Low Severity   • Clindamycin Unknown - Low Severity   • Compazine [Prochlorperazine Edisylate] Rash   • Contrast Dye Other (See Comments)     Caused pain in her arm   • Doxycycline GI Intolerance   • Eggs Or Egg-Derived Products Unknown - Low Severity  "  • Nsaids Unknown - Low Severity   • Phenergan [Promethazine Hcl] Rash   • Promethazine Unknown - Low Severity   • Vancomycin Itching       Social History     Tobacco Use   Smoking Status Current Every Day Smoker   • Packs/day: 2.00   • Years: 41.00   • Pack years: 82.00   • Types: Cigarettes   Smokeless Tobacco Never Used          Objective   Vital Signs:   /68   Pulse 99   Temp 97.5 °F (36.4 °C)   Resp 16   Ht 165.1 cm (65\")   Wt 75.3 kg (166 lb)   SpO2 96%   BMI 27.62 kg/m²     Physical Exam  Vitals reviewed.   Constitutional:       Appearance: Normal appearance.   HENT:      Head: Normocephalic and atraumatic.      Nose: Nose normal.      Mouth/Throat:      Mouth: Mucous membranes are moist.   Eyes:      Extraocular Movements: Extraocular movements intact.      Conjunctiva/sclera: Conjunctivae normal.      Pupils: Pupils are equal, round, and reactive to light.   Cardiovascular:      Rate and Rhythm: Normal rate and regular rhythm.   Pulmonary:      Effort: Pulmonary effort is normal.      Breath sounds: Normal breath sounds.   Abdominal:      General: Abdomen is flat. Bowel sounds are normal.      Palpations: Abdomen is soft.   Musculoskeletal:         General: Normal range of motion.      Comments: In wheelchair   Skin:     Comments: Lesions on posterior calf open, no underlying erythema or exudate. No odor   Neurological:      General: No focal deficit present.      Mental Status: She is alert and oriented to person, place, and time.   Psychiatric:         Mood and Affect: Mood normal.        Result Review :                 Assessment and Plan    Diagnoses and all orders for this visit:    1. Sepsis without acute organ dysfunction, due to unspecified organism (HCC) (Primary)  Assessment & Plan:  Reviewed discharge summary. Discussed ER precautions with pt in detail. Discussed in detail the reasons which she is being discharged from our practice including non compliance. Pt was scheduled for an " establish care visit with a provider in [place] but pt missed the appt. Strongly encouraged patient to keep her rescheduled established care apt in order to prevent gaps in care.      2. Close exposure to COVID-19 virus  Comments:  Discussed possible exposure, monitor closely for symptoms and rtc or UC/ER for testing/eval if sx occur    3. Type 2 diabetes mellitus with hyperglycemia, with long-term current use of insulin (HCC)  Assessment & Plan:  Discussed complications of uncontrolled dm including mi, stroke, amputation, death, CKD, blindness, non-healing wounds, neuropathy, infections. Pt is managed by endocrine so she will follow up with their office for med adjustment.       4. Blister of toe of right foot without infection, initial encounter  Comments:  No infection today ,monitor closely due to pt dm and severe venous stasis and risk for infection.    5. Urinary tract infection without hematuria, site unspecified  Assessment & Plan:  Reviewed discharge summary. Since pt is still having dysuira, discussed need for UA. Pt states ''she cannot urinate today''. Encouraged her to rtc to leave sample.      6. Cellulitis, unspecified cellulitis site  Comments:  Reviewed discharge summary. Pt will keep upcoming apt with wound care. Discussed risks of sepsis, amputation and death if she does not follow up as directed.    Other orders  -     clopidogrel (Plavix) 75 MG tablet; Take 1 tablet by mouth Daily.  Dispense: 30 tablet; Refill: 0      Follow Up   No follow-ups on file.  Patient was given instructions and counseling regarding her condition or for health maintenance advice. Please see specific information pulled into the AVS if appropriate.

## 2021-09-30 PROBLEM — N39.0 URINARY TRACT INFECTION WITHOUT HEMATURIA: Status: RESOLVED | Noted: 2021-09-30 | Resolved: 2021-09-30

## 2021-09-30 PROBLEM — N39.0 URINARY TRACT INFECTION WITHOUT HEMATURIA: Status: ACTIVE | Noted: 2021-09-30

## 2021-09-30 NOTE — ASSESSMENT & PLAN NOTE
Reviewed discharge summary. Since pt is still having dysuira, discussed need for UA. Pt states ''she cannot urinate today''. Encouraged her to rtc to leave sample.

## 2021-09-30 NOTE — ASSESSMENT & PLAN NOTE
Reviewed discharge summary. Discussed ER precautions with pt in detail. Discussed in detail the reasons which she is being discharged from our practice including non compliance. Pt was scheduled for an establish care visit with a provider in [place] but pt missed the appt. Strongly encouraged patient to keep her rescheduled established care apt in order to prevent gaps in care.

## 2021-09-30 NOTE — ASSESSMENT & PLAN NOTE
Discussed complications of uncontrolled dm including mi, stroke, amputation, death, CKD, blindness, non-healing wounds, neuropathy, infections. Pt is managed by endocrine so she will follow up with their office for med adjustment.

## 2021-10-05 ENCOUNTER — TELEPHONE (OUTPATIENT)
Dept: INTERNAL MEDICINE | Facility: CLINIC | Age: 56
End: 2021-10-05

## 2021-10-05 ENCOUNTER — OFFICE VISIT (OUTPATIENT)
Dept: WOUND CARE | Facility: HOSPITAL | Age: 56
End: 2021-10-05

## 2021-10-05 VITALS
RESPIRATION RATE: 18 BRPM | HEART RATE: 89 BPM | OXYGEN SATURATION: 95 % | SYSTOLIC BLOOD PRESSURE: 118 MMHG | TEMPERATURE: 96.5 F | DIASTOLIC BLOOD PRESSURE: 70 MMHG

## 2021-10-05 DIAGNOSIS — L97.211 VENOUS STASIS ULCER OF RIGHT CALF LIMITED TO BREAKDOWN OF SKIN, UNSPECIFIED WHETHER VARICOSE VEINS PRESENT (HCC): ICD-10-CM

## 2021-10-05 DIAGNOSIS — I87.8 VENOUS STASIS OF BOTH LOWER EXTREMITIES: ICD-10-CM

## 2021-10-05 DIAGNOSIS — L97.221 VENOUS STASIS ULCER OF LEFT CALF LIMITED TO BREAKDOWN OF SKIN, UNSPECIFIED WHETHER VARICOSE VEINS PRESENT (HCC): Primary | ICD-10-CM

## 2021-10-05 DIAGNOSIS — L97.511 ULCER OF GREAT TOE, RIGHT, LIMITED TO BREAKDOWN OF SKIN (HCC): ICD-10-CM

## 2021-10-05 DIAGNOSIS — I83.012 VENOUS STASIS ULCER OF RIGHT CALF LIMITED TO BREAKDOWN OF SKIN, UNSPECIFIED WHETHER VARICOSE VEINS PRESENT (HCC): ICD-10-CM

## 2021-10-05 DIAGNOSIS — I83.022 VENOUS STASIS ULCER OF LEFT CALF LIMITED TO BREAKDOWN OF SKIN, UNSPECIFIED WHETHER VARICOSE VEINS PRESENT (HCC): Primary | ICD-10-CM

## 2021-10-05 PROCEDURE — G0463 HOSPITAL OUTPT CLINIC VISIT: HCPCS | Performed by: EMERGENCY MEDICINE

## 2021-10-05 PROCEDURE — 99214 OFFICE O/P EST MOD 30 MIN: CPT | Performed by: EMERGENCY MEDICINE

## 2021-10-05 RX ORDER — INSULIN LISPRO 100 [IU]/ML
INJECTION, SOLUTION INTRAVENOUS; SUBCUTANEOUS
COMMUNITY
Start: 2021-08-26 | End: 2022-01-13

## 2021-10-05 RX ORDER — FLURBIPROFEN SODIUM 0.3 MG/ML
SOLUTION/ DROPS OPHTHALMIC
COMMUNITY
Start: 2021-09-25

## 2021-10-05 RX ORDER — INSULIN GLARGINE 100 [IU]/ML
INJECTION, SOLUTION SUBCUTANEOUS
COMMUNITY
Start: 2021-08-27 | End: 2022-01-13

## 2021-10-05 NOTE — PROGRESS NOTES
Chief Complaint  Wound Check (BLE ULCERS & RIGHT GREAT TOE ULCER; DM, )    Subjective        History of Present Illness    Patient is a 55-year-old female with a history of venous stasis in BLE with recurrent ulcerations and cellulitis. Her last visit to the clinic was 06/15. She has been seen at least twice in the ED for cellulitis in the interim. She was admitted to the hospital on 09/06 for cellulitis related to BLE wounds and urosepsis.  The patient requested to leave the hospital after only 2 days and she was sent home on Levaquin based on urinary sensitivities for E. Coli.    She has been resistant to using Unna boots and says she cannot use them because they make her itch like crazy. She is a 1.5 PPD smoker and does not wish to quit.  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  Her most recent wound cultures in 06/2021 showed MSSA. She says she has been wrapping her legs with Kerlix and Ace bandages but just cannot seem to get the swelling to go away. She also developed a blister on the medial aspect of her right great toe last week which then broke open revealing an ulceration which is now present.    She is very somnolent here and says that the pain pump that she has has had the medication dosage turned down and that it is not due to her medication. She says they are trying to figure out why this is happening to her.    Allergies:  Amoxicillin, Ceclor [cefaclor], Penicillins, Sulfa antibiotics, Valium [diazepam], Ambien [zolpidem], Aspirin, Ativan [lorazepam], Benadryl [diphenhydramine], Biaxin [clarithromycin], Cefazolin, Cephalexin, Cephalosporins, Clindamycin, Compazine [prochlorperazine edisylate], Contrast dye, Doxycycline, Eggs or egg-derived products, Nsaids, Phenergan [promethazine hcl], Promethazine, and Vancomycin      Current Outpatient Medications:   •  acetaminophen (TYLENOL) 500 MG  tablet, Take 1,000-1,500 mg by mouth Every 6 (Six) Hours As Needed for Mild Pain ., Disp: , Rfl:   •  albuterol (PROVENTIL HFA;VENTOLIN HFA) 108 (90 Base) MCG/ACT inhaler, Inhale 2 puffs Every 4 (Four) Hours As Needed for Wheezing., Disp: , Rfl:   •  albuterol sulfate  (90 Base) MCG/ACT inhaler, Inhale 1 puff Every 4 (Four) Hours As Needed., Disp: , Rfl:   •  B-D UF III MINI PEN NEEDLES 31G X 5 MM misc, , Disp: , Rfl:   •  clopidogrel (Plavix) 75 MG tablet, Take 1 tablet by mouth Daily., Disp: 30 tablet, Rfl: 0  •  Elastic Bandages & Supports (ACE Bandage Self-Adhering) misc, 1 each Daily., Disp: 60 each, Rfl: 0  •  Flovent  MCG/ACT inhaler, inhale 2 puffs by mouth twice daily (Patient taking differently: Inhale 1 puff 2 (Two) Times a Day.), Disp: 12 g, Rfl: 2  •  furosemide (LASIX) 40 MG tablet, Take 1 tablet by mouth Daily., Disp: 90 tablet, Rfl: 1  •  gabapentin (NEURONTIN) 300 MG capsule, TAKE ONE CAPSULE BY MOUTH THREE TIMES DAILY (Patient taking differently: Take 300 mg by mouth 3 (Three) Times a Day.), Disp: 270 capsule, Rfl: 1  •  HYDROmorphone (DILAUDID) 1 mg/mL solution, Infuse  into a venous catheter Dose Adjusted By Provider As Needed. PER PAIN PUMP, Disp: , Rfl:   •  hydrOXYzine (ATARAX) 50 MG tablet, Take 50 mg by mouth 4 (Four) Times a Day., Disp: , Rfl:   •  Insulin Lispro (ADMELOG SOLOSTAR) 100 UNIT/ML injection pen, Inject 12 Units under the skin into the appropriate area as directed Daily. SLIDING SCALE R/T BS. Takes 2-12 units tid, Disp: , Rfl:   •  Insulin Lispro, 1 Unit Dial, (HUMALOG) 100 UNIT/ML solution pen-injector, inject TWO TO 12 UNITS THREE TIMES DAILY as directed, Disp: , Rfl:   •  ipratropium-albuterol (DUO-NEB) 0.5-2.5 mg/3 ml nebulizer, Inhale 1 ampule., Disp: , Rfl:   •  Lantus SoloStar 100 UNIT/ML injection pen, inject TEN TO 40 UNITS EVERY DAY as directed, Disp: , Rfl:   •  ondansetron (Zofran) 4 MG tablet, Take 1 tablet by mouth Every 8 (Eight) Hours As Needed for  Nausea or Vomiting., Disp: 8 tablet, Rfl: 0  •  silver sulfadiazine (Silvadene) 1 % cream, Apply 1 application topically to the appropriate area as directed Daily., Disp: 400 g, Rfl: 3  •  sodium chloride 0.9 % solution with HYDROmorphone (PF) 50 MG/5ML solution 0.2 mg/mL, Infuse 0-2 mg into a venous catheter Every 1 (One) Hour As Needed for Pain., Disp: , Rfl:   •  Sodium Sulfate-Mag Sulfate-KCl (Sutab) 5693-220-170 MG tablet, Take 188 mg by mouth Continuous., Disp: 24 tablet, Rfl: 0  •  triamcinolone (KENALOG) 0.1 % ointment, Apply  topically to the appropriate area as directed 2 (Two) Times a Day. For one week for venous stasis dermatitis. Do not apply to open wounds (Patient taking differently: Apply 1 application topically to the appropriate area as directed 2 (Two) Times a Day. For one week for venous stasis dermatitis. Do not apply to open wounds), Disp: 15 g, Rfl: 0    Current Facility-Administered Medications:   •  sterile water irrigation solution 500 mL, 500 mL, Irrigation, Once, Damari Cornejo PA-C    Past Medical History:   Diagnosis Date   • Anxiety     NO CURRENT MEDICATION   • Aortic stenosis, severe    • Arthritis    • Asthma    • Back pain    • Blood clotting disorder (HCC)    • Cancer associated pain    • Chronic low back pain with sciatica    • Diabetes mellitus (HCC)     TYPE 2   • Dyspnea on effort    • GERD (gastroesophageal reflux disease)    • Hiatal hernia    • History of degenerative disc disease    • History of kidney stones    • History of pulmonary embolus (PE)    • History of transfusion    • Hodgkin's lymphoma (HCC)    • Immobility    • Lupus (HCC)    • Mitral valve prolapse    • Pelvic pain    • Peripheral neuropathy    • Personal history of DVT (deep vein thrombosis)    • Presence of intrathecal pump    • Sacroiliac joint disease    • SI (sacroiliac) joint inflammation (HCC)    • Venous insufficiency      Past Surgical History:   Procedure Laterality Date   • BACK SURGERY       SPINAL FUSION    • BLADDER REPAIR     • CARDIAC CATHETERIZATION N/A 3/6/2019    Procedure: Valvuloplasty;  Surgeon: Wayne Johnson MD;  Location: [facility] CATH INVASIVE LOCATION;  Service: Cardiology   • CARDIAC VALVE REPLACEMENT  2021   • CHOLECYSTECTOMY     • HYSTERECTOMY     • LYMPHADENECTOMY     • PACEMAKER IMPLANTATION     • PAIN PUMP INSERTION/REVISION N/A 10/18/2019    Procedure: PAIN PUMP INSERTION [facility] 10-18-19 [place];  Surgeon: Horace Rios MD;  Location: [facility] MAIN OR;  Service: Pain Management   • PAIN PUMP INSERTION/REVISION N/A 11/23/2020    Procedure: pain pump removal;  Surgeon: Horace Rios MD;  Location: [facility] MAIN OR;  Service: Pain Management;  Laterality: N/A;   • TONSILLECTOMY     • TUBAL ABDOMINAL LIGATION     • TUMOR REMOVAL       Social History     Socioeconomic History   • Marital status:      Spouse name: Not on file   • Number of children: Not on file   • Years of education: Not on file   • Highest education level: Not on file   Tobacco Use   • Smoking status: Current Every Day Smoker     Packs/day: 2.00     Years: 41.00     Pack years: 82.00     Types: Cigarettes   • Smokeless tobacco: Never Used   Vaping Use   • Vaping Use: Never used   Substance and Sexual Activity   • Alcohol use: No   • Drug use: No   • Sexual activity: Defer     Family History   Problem Relation Age of Onset   • Pancreatic cancer Sister    • Pancreatic cancer Paternal Grandmother    • Malig Hyperthermia Neg Hx          Objective     Vitals:    10/05/21 0853   BP: 118/70   BP Location: Right arm   Patient Position: Sitting   Pulse: 89   Resp: 18   Temp: 96.5 °F (35.8 °C)   TempSrc: Temporal   SpO2: 95%     There is no height or weight on file to calculate BMI.    STEADI Fall Risk Assessment has not been completed.     Review of Systems     ROS:  No fevers, chills, sweats, or body aches. No shortness of breath.     I have reviewed the HPI and ROS as documented by MA/RN. Chelo Villeda,  MD    Physical Exam     NAD, sitting in wheelchair  Normocephalic, atraumatic  No respiratory distress, no cough  Somnolent, nodding off  RRR, 1-2+ pitting edema BLE, dopplerable DP and PT pulses bilaterally  BLE with multiple shallow open ulcerations and pitting edema noted. Small amount of serous drainage. No evidence of cellulitis, no purulence, induration, fluctuance, or heat. Right medial great toe with shallow wound from prior blister. See photos for details.                              Result Review :  The following data was reviewed by: Chelo Villeda MD on 10/05/2021:    Prior ED visit notes from July and September, prior ID visit August, recent hospital admission and discharge notes, prior labs including wound cultures and labs from recent admission, prior imaging studies, prior vascular surgery office visits, prior wound images and therapy plan    Unable to include nursing wound documentation and measurements due to character limitations.             Assessment and Plan   Diagnoses and all orders for this visit:    1. Venous stasis ulcer of left calf limited to breakdown of skin, unspecified whether varicose veins present (HCC) (Primary)  -     silver sulfadiazine (Silvadene) 1 % cream; Apply 1 application topically to the appropriate area as directed Daily.  Dispense: 400 g; Refill: 3    2. Venous stasis ulcer of right calf limited to breakdown of skin, unspecified whether varicose veins present (HCC)    3. Venous stasis of both lower extremities    4. Ulcer of great toe, right, limited to breakdown of skin (HCC)  -     silver sulfadiazine (Silvadene) 1 % cream; Apply 1 application topically to the appropriate area as directed Daily.  Dispense: 400 g; Refill: 3        Thin layer of Silvadene to all wounds. Wrap BLE with Kerlix and Ace wrap at all times. Elevate legs at all times while seated or lying down. Consult with PCP regarding possible diuresis. Return in 3 weeks for recheck. Advised to stop  smoking but patient does not wish to quit at this time.    Patient was given instructions and counseling regarding their condition or for health maintenance advice, as well as the wound care plan and recommendations. They understand and agree with the plan.  They will follow back up here in the clinic but are instructed to contact us in the interim should they have any new, returning, or worsening symptoms or concerns. Please see specific information pulled into the AVS if appropriate.       Follow Up   Return in about 3 weeks (around 10/26/2021) for Recheck.      Chelo Villeda MD

## 2021-10-05 NOTE — SIGNIFICANT NOTE
Epithelial %: 0%    Exposed Bone: no    Exposed Tendon: no    Impression:  NEW AREA    Short Term Goals: INCREASE GRANULATION, DECREASE SIZE

## 2021-10-05 NOTE — TELEPHONE ENCOUNTER
Caller: WalterSheila    Relationship: Emergency Contact    Best call back number: [phone]    What form or medical record are you requesting: PAPER WORK TO REPAIR OR REPLACE POWER WHEEL CHAIR    Who is requesting this form or medical record from you[place] [facility] [place]    How would you like to receive the form or medical records (pick-up, mail, fax): FAX  If fax, what is the fax number: [phone]    Timeframe paperwork needed: AS SOON AS POSSIBLE    Additional notes: PATIENT AND EMERGENCY CONTACT VERY DISGRUNTLED STATING THEY'VE BEEN WAITING ON THIS ORDER/PAPERWORK FOR TWO MONTHS AND NOTHING HAS BEEN DONE. THIS PAPERWORK IS NEEDED TO REPAIR OR REPLACE HER POWER WHEEL CHAIR.     THEY WOULD LIKE A CALL ONCE THE PAPERWORK IS FAXED OVER AND REQUESTED A COPY OF THE PAPERWORK FOR THEIR RECORDS.    OUTSIDE TESTING RESULT REQUEST     IMPORTANT: FOR YOUR IMMEDIATE ATTENTION  Please FAX all test results listed below to: [phone]         * * * * If testing is NOT complete, arrange with patient A.S.A.P. * * * *      Patient Name: Pavithra Anderson  Surgery Date: 3/1/2023  CSN: [id number]  Medical Record: [id number]   : 1956 - A: 77 y      Sex: male  Surgeon(s):  Marek Demarco MD  Procedure: LUMBAR 4  -5  POSTERIOR INSTRUMENTED FUSION, AND LUMBAR 1 - 2, LUMBAR 2 -3, LUMBAR 3-4, LUMBAR 4-5  LAMINECTOMY  Anesthesia Type: General     Surgeon: Marek Demarco MD     The following Testing and Time Line are REQUIRED PER ANESTHESIASURGEON     CBC / BMP / PT AND PTT / MSSA AND MRSA SWAB / EKG. Surgeon requests labs be done thru [facility] lab. Also,pt requests COVID be done thru you. Please drop order and pt will have done       Thank You,   Sent by: Rosa GIRON Rn

## 2021-10-06 NOTE — TELEPHONE ENCOUNTER
PER PT CHART-- Medical Social Worker contacted patient to follow up regarding wheelchair script. MSW faxed wheelchair script on 7/16/21 to [facility] at [facility].  They can contact [employer] to discuss chair.  I have discussed with her at numerous visits that her insurance may not cover the chair/repair since it had not been the length of time that insurance requires in order to pay for the chair.

## 2021-10-07 NOTE — TELEPHONE ENCOUNTER
Caller: Sheila Watson    Relationship: Emergency Contact    Best call back number: [phone]    Who are you requesting to speak with (clinical staff, provider,  specific staff member): MEDICAL STAFF    What was the call regarding: PATIENT WOULD LIKE TO KNOW IF PAPERWORK FOR CHAIR REPAIR CAN BE LEFT AT THE  TO . PLEASE CALL PATIENT TO ADVISE ON PICKING UP PAPERWORK.

## 2021-10-13 NOTE — TELEPHONE ENCOUNTER
Attempted to call patient. No answer. Unable to leave voicemail.     Hub please read: Please call [facility] to discuss chair repair.

## 2021-10-20 ENCOUNTER — TELEPHONE (OUTPATIENT)
Dept: GASTROENTEROLOGY | Facility: CLINIC | Age: 56
End: 2021-10-20

## 2021-10-20 DIAGNOSIS — Z01.818 PREOP TESTING: Primary | ICD-10-CM

## 2021-10-20 PROBLEM — R19.4 CHANGE IN BOWEL HABITS: Status: ACTIVE | Noted: 2021-10-20

## 2021-10-20 PROBLEM — R11.0 NAUSEA: Status: ACTIVE | Noted: 2021-10-20

## 2021-10-20 NOTE — TELEPHONE ENCOUNTER
Patient states that she missed the appt for her egd. Patient has been rescheduled for a double on 12/03/2021 with an arrival time of 9 am.   Her covid test has been scheduled for 11/29/2021 at 4:30.     Patient also states she is having abdominal pain and can feel several knots and wanted to make you aware.

## 2021-10-21 NOTE — TELEPHONE ENCOUNTER
From what I see in Epic, it looks like she is only listed for EGD on 12/3/21.  Please make sure she is actually scheduled for both.  thanks

## 2021-10-26 ENCOUNTER — TRANSCRIBE ORDERS (OUTPATIENT)
Dept: ADMINISTRATIVE | Facility: HOSPITAL | Age: 56
End: 2021-10-26

## 2021-10-26 DIAGNOSIS — C81.90 HODGKIN LYMPHOMA, UNSPECIFIED HODGKIN LYMPHOMA TYPE, UNSPECIFIED BODY REGION (HCC): Primary | ICD-10-CM

## 2021-10-27 ENCOUNTER — HOSPITAL ENCOUNTER (OUTPATIENT)
Dept: PET IMAGING | Facility: HOSPITAL | Age: 56
Discharge: HOME | End: 2021-10-27

## 2021-10-27 DIAGNOSIS — C81.90 HODGKIN LYMPHOMA, UNSPECIFIED HODGKIN LYMPHOMA TYPE, UNSPECIFIED BODY REGION (HCC): ICD-10-CM

## 2021-10-27 PROCEDURE — 78815 PET IMAGE W/CT SKULL-THIGH: CPT

## 2021-10-27 PROCEDURE — 0 FLUDEOXYGLUCOSE F18 SOLUTION: Performed by: INTERNAL MEDICINE

## 2021-10-27 PROCEDURE — A9552 F18 FDG: HCPCS | Performed by: INTERNAL MEDICINE

## 2021-10-27 RX ADMIN — FLUDEOXYGLUCOSE F18 1 DOSE: 300 INJECTION INTRAVENOUS at 09:59

## 2021-11-03 ENCOUNTER — TELEPHONE (OUTPATIENT)
Dept: INTERNAL MEDICINE | Facility: CLINIC | Age: 56
End: 2021-11-03

## 2021-11-03 DIAGNOSIS — R53.1 WEAKNESS: Primary | ICD-10-CM

## 2021-11-03 NOTE — TELEPHONE ENCOUNTER
Patient called requesting for wheelchair repair or replacement order to be sent to [facility]. Faxed order.

## 2021-11-11 ENCOUNTER — OFFICE VISIT (OUTPATIENT)
Dept: WOUND CARE | Facility: HOSPITAL | Age: 56
End: 2021-11-11

## 2021-11-11 VITALS
HEIGHT: 65 IN | DIASTOLIC BLOOD PRESSURE: 76 MMHG | BODY MASS INDEX: 27.66 KG/M2 | TEMPERATURE: 97.2 F | RESPIRATION RATE: 16 BRPM | SYSTOLIC BLOOD PRESSURE: 132 MMHG | WEIGHT: 166 LBS | HEART RATE: 97 BPM

## 2021-11-11 DIAGNOSIS — I87.8 VENOUS STASIS OF BOTH LOWER EXTREMITIES: ICD-10-CM

## 2021-11-11 DIAGNOSIS — I83.012 VENOUS STASIS ULCER OF RIGHT CALF LIMITED TO BREAKDOWN OF SKIN, UNSPECIFIED WHETHER VARICOSE VEINS PRESENT (HCC): ICD-10-CM

## 2021-11-11 DIAGNOSIS — L97.511 ULCER OF GREAT TOE, RIGHT, LIMITED TO BREAKDOWN OF SKIN (HCC): ICD-10-CM

## 2021-11-11 DIAGNOSIS — I83.022 VENOUS STASIS ULCER OF LEFT CALF LIMITED TO BREAKDOWN OF SKIN, UNSPECIFIED WHETHER VARICOSE VEINS PRESENT (HCC): Primary | ICD-10-CM

## 2021-11-11 DIAGNOSIS — E11.621 TYPE 2 DIABETES MELLITUS WITH FOOT ULCER, WITH LONG-TERM CURRENT USE OF INSULIN (HCC): ICD-10-CM

## 2021-11-11 DIAGNOSIS — L97.509 TYPE 2 DIABETES MELLITUS WITH FOOT ULCER, WITH LONG-TERM CURRENT USE OF INSULIN (HCC): ICD-10-CM

## 2021-11-11 DIAGNOSIS — R60.0 PEDAL EDEMA: ICD-10-CM

## 2021-11-11 DIAGNOSIS — Z79.4 TYPE 2 DIABETES MELLITUS WITH FOOT ULCER, WITH LONG-TERM CURRENT USE OF INSULIN (HCC): ICD-10-CM

## 2021-11-11 DIAGNOSIS — L97.221 VENOUS STASIS ULCER OF LEFT CALF LIMITED TO BREAKDOWN OF SKIN, UNSPECIFIED WHETHER VARICOSE VEINS PRESENT (HCC): Primary | ICD-10-CM

## 2021-11-11 DIAGNOSIS — L97.211 VENOUS STASIS ULCER OF RIGHT CALF LIMITED TO BREAKDOWN OF SKIN, UNSPECIFIED WHETHER VARICOSE VEINS PRESENT (HCC): ICD-10-CM

## 2021-11-11 PROCEDURE — 11042 DBRDMT SUBQ TIS 1ST 20SQCM/<: CPT | Performed by: EMERGENCY MEDICINE

## 2021-11-11 PROCEDURE — 99214 OFFICE O/P EST MOD 30 MIN: CPT | Performed by: EMERGENCY MEDICINE

## 2021-11-11 NOTE — PROGRESS NOTES
Chief Complaint  Wound Check (venous stasis ulcers)    Subjective        History of Present Illness    Patient is a 55-year-old female with a history of venous stasis in BLE with recurrent ulcerations and cellulitis. Her last visit to the clinic was 06/15. She has been seen at least twice in the ED for cellulitis in the interim. She was admitted to the hospital on 09/06 for cellulitis related to BLE wounds and urosepsis.  The patient requested to leave the hospital after only 2 days and she was sent home on Levaquin based on urinary sensitivities for E. Coli.    She has been resistant to using Unna boots and says she cannot use them because they make her itch like crazy. She is a 1.5 PPD smoker and does not wish to quit.  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  Her most recent wound cultures in 06/2021 showed MSSA.     She says she has been wrapping her legs with Silvadene, Kerlix and Ace bandages but just cannot seem to get the swelling to go away.  She does not keep them wrapped all the time and says she takes the wraps off at night.  She has difficulty elevating her legs because of arthritis pain she says.  She also developed a blister on the medial aspect of her right great toe which has been persistent for couple months now and will not heal.    She is difficult because she refuses Unna boots which would be the most helpful for her.  She says that they are too itchy and she just cannot do it.  She is resistant to most suggestions.    Allergies:  Amoxicillin, Ceclor [cefaclor], Penicillins, Sulfa antibiotics, Valium [diazepam], Ambien [zolpidem], Aspirin, Ativan [lorazepam], Benadryl [diphenhydramine], Biaxin [clarithromycin], Cefazolin, Cephalexin, Cephalosporins, Clindamycin, Compazine [prochlorperazine edisylate], Contrast dye, Doxycycline, Eggs or egg-derived products, Nsaids, Phenergan  [promethazine hcl], Promethazine, and Vancomycin      Current Outpatient Medications:   •  acetaminophen (TYLENOL) 500 MG tablet, Take 1,000-1,500 mg by mouth Every 6 (Six) Hours As Needed for Mild Pain ., Disp: , Rfl:   •  albuterol (PROVENTIL HFA;VENTOLIN HFA) 108 (90 Base) MCG/ACT inhaler, Inhale 2 puffs Every 4 (Four) Hours As Needed for Wheezing., Disp: , Rfl:   •  albuterol sulfate  (90 Base) MCG/ACT inhaler, Inhale 1 puff Every 4 (Four) Hours As Needed., Disp: , Rfl:   •  B-D UF III MINI PEN NEEDLES 31G X 5 MM misc, , Disp: , Rfl:   •  clopidogrel (Plavix) 75 MG tablet, Take 1 tablet by mouth Daily., Disp: 30 tablet, Rfl: 0  •  Elastic Bandages & Supports (ACE Bandage Self-Adhering) misc, 1 each Daily., Disp: 60 each, Rfl: 0  •  Flovent  MCG/ACT inhaler, inhale 2 puffs by mouth twice daily (Patient taking differently: Inhale 1 puff 2 (Two) Times a Day.), Disp: 12 g, Rfl: 2  •  furosemide (LASIX) 40 MG tablet, Take 1 tablet by mouth Daily., Disp: 90 tablet, Rfl: 1  •  gabapentin (NEURONTIN) 300 MG capsule, TAKE ONE CAPSULE BY MOUTH THREE TIMES DAILY (Patient taking differently: Take 300 mg by mouth 3 (Three) Times a Day.), Disp: 270 capsule, Rfl: 1  •  HYDROmorphone (DILAUDID) 1 mg/mL solution, Infuse  into a venous catheter Dose Adjusted By Provider As Needed. PER PAIN PUMP, Disp: , Rfl:   •  hydrOXYzine (ATARAX) 50 MG tablet, Take 50 mg by mouth 4 (Four) Times a Day., Disp: , Rfl:   •  Insulin Lispro (ADMELOG SOLOSTAR) 100 UNIT/ML injection pen, Inject 12 Units under the skin into the appropriate area as directed Daily. SLIDING SCALE R/T BS. Takes 2-12 units tid, Disp: , Rfl:   •  Insulin Lispro, 1 Unit Dial, (HUMALOG) 100 UNIT/ML solution pen-injector, inject TWO TO 12 UNITS THREE TIMES DAILY as directed, Disp: , Rfl:   •  ipratropium-albuterol (DUO-NEB) 0.5-2.5 mg/3 ml nebulizer, Inhale 1 ampule., Disp: , Rfl:   •  Lantus SoloStar 100 UNIT/ML injection pen, inject TEN TO 40 UNITS EVERY DAY as  directed, Disp: , Rfl:   •  ondansetron (Zofran) 4 MG tablet, Take 1 tablet by mouth Every 8 (Eight) Hours As Needed for Nausea or Vomiting., Disp: 8 tablet, Rfl: 0  •  polyethylene glycol (GoLYTELY) 236 g solution, Starting at noon on day prior to procedure, drink 8 ounces every 30 minutes until all gone or stools are clear. May add flavor packet., Disp: 4000 mL, Rfl: 0  •  silver sulfadiazine (Silvadene) 1 % cream, Apply 1 application topically to the appropriate area as directed Daily., Disp: 400 g, Rfl: 3  •  silver sulfadiazine (Silvadene) 1 % cream, Apply 1 application topically to the appropriate area as directed Daily., Disp: 400 g, Rfl: 3  •  sodium chloride 0.9 % solution with HYDROmorphone (PF) 50 MG/5ML solution 0.2 mg/mL, Infuse 0-2 mg into a venous catheter Every 1 (One) Hour As Needed for Pain., Disp: , Rfl:   •  Sodium Sulfate-Mag Sulfate-KCl (Sutab) 4454-485-179 MG tablet, Take 188 mg by mouth Continuous., Disp: 24 tablet, Rfl: 0  •  triamcinolone (KENALOG) 0.1 % ointment, Apply  topically to the appropriate area as directed 2 (Two) Times a Day. For one week for venous stasis dermatitis. Do not apply to open wounds (Patient taking differently: Apply 1 application topically to the appropriate area as directed 2 (Two) Times a Day. For one week for venous stasis dermatitis. Do not apply to open wounds), Disp: 15 g, Rfl: 0    Current Facility-Administered Medications:   •  sterile water irrigation solution 500 mL, 500 mL, Irrigation, Once, Damari Cornejo PA-C    Past Medical History:   Diagnosis Date   • Anxiety     NO CURRENT MEDICATION   • Aortic stenosis, severe    • Arthritis    • Asthma    • Back pain    • Blood clotting disorder (HCC)    • Cancer associated pain    • Chronic low back pain with sciatica    • Diabetes mellitus (HCC)     TYPE 2   • Dyspnea on effort    • GERD (gastroesophageal reflux disease)    • Hiatal hernia    • History of degenerative disc disease    • History of kidney  stones    • History of pulmonary embolus (PE)    • History of transfusion    • Hodgkin's lymphoma (HCC)    • Immobility    • Lupus (HCC)    • Mitral valve prolapse    • Pelvic pain    • Peripheral neuropathy    • Personal history of DVT (deep vein thrombosis)    • Presence of intrathecal pump    • Sacroiliac joint disease    • SI (sacroiliac) joint inflammation (HCC)    • Venous insufficiency      Past Surgical History:   Procedure Laterality Date   • BACK SURGERY      SPINAL FUSION    • BLADDER REPAIR     • CARDIAC CATHETERIZATION N/A 3/6/2019    Procedure: Valvuloplasty;  Surgeon: Wayne Johnson MD;  Location: [facility] INVASIVE LOCATION;  Service: Cardiology   • CARDIAC VALVE REPLACEMENT  2021   • CHOLECYSTECTOMY     • HYSTERECTOMY     • LYMPHADENECTOMY     • PACEMAKER IMPLANTATION     • PAIN PUMP INSERTION/REVISION N/A 10/18/2019    Procedure: PAIN PUMP INSERTION \[facility] [place] [facility]\"" 10-18-19 [place];  Surgeon: Horace Rios MD;  Location: [facility] OR;  Service: Pain Management   • PAIN PUMP INSERTION/REVISION N/A 11/23/2020    Procedure: pain pump removal;  Surgeon: Horace Rios MD;  Location: [facility];  Service: Pain Management;  Laterality: N/A;   • TONSILLECTOMY     • TUBAL ABDOMINAL LIGATION     • TUMOR REMOVAL       Social History     Socioeconomic History   • Marital status:    Tobacco Use   • Smoking status: Current Every Day Smoker     Packs/day: 2.00     Years: 41.00     Pack years: 82.00     Types: Cigarettes   • Smokeless tobacco: Never Used   Vaping Use   • Vaping Use: Never used   Substance and Sexual Activity   • Alcohol use: No   • Drug use: No   • Sexual activity: Defer     Family History   Problem Relation Age of Onset   • Pancreatic cancer Sister    • Pancreatic cancer Paternal Grandmother    • Malig Hyperthermia Neg Hx          Objective     Vitals:    11/11/21 1415   BP: 132/76   BP Location: Left arm   Patient Position: Sitting   Pulse: 97   Resp: 16   Temp: 97.2  "°F (36.2 °C)   TempSrc: Temporal   Weight: 75.3 kg (166 lb)   Height: 165.1 cm (65\")   PainSc:   9     Body mass index is 27.62 kg/m².    STEADI Fall Risk Assessment has not been completed.     Review of Systems     ROS:  No fevers, chills, sweats, or body aches. No shortness of breath.     I have reviewed the HPI and ROS as documented by MA/RN. Chelo Villeda MD    Physical Exam     NAD, sitting in wheelchair  Normocephalic, atraumatic  No respiratory distress, no cough  Somnolent, nodding off  RRR, 1-2+ pitting edema BLE, but somewhat improved from prior  BLE with multiple shallow open ulcerations and pitting edema noted. Small amount of dried drainage. No evidence of cellulitis, no purulence, induration, fluctuance, active drainage, or heat. Right medial great toe with shallow wound from prior blister, maceration centrally and hard callus peripherally. See photos for details.    LLE:                    RLE:                        Result Review :  The following data was reviewed by: Chelo Villeda MD on 11/11/2021:    Prior office notes and wound photos.    Unable to include nursing wound documentation and measurements due to character limitations.    Wound debridement  Performed by: Chelo Villeda MD  Authorized by: Chelo Villeda MD     Correct patient: Identification verified by two methods:     Verbally and Date of birth  Correct procedure/consent    Correct site/side    Correct equipment as applicable    How many wounds are you performing a debridement on?:  1  Wound 1:     Nursing Documentation:     Wound Location:  Right medial great toe  Measurements:     The total amount debrided on this wound was under 20 cm2.     Provider Documentation    Debridement type:  Sharp/excisional    Betadine      Other:  Sterile 15 blade scalpel     None    Tissue debrided:  Small    Level removed:  Subcutaneous    Patient tolerance:  Good    Hemostasis achieved by:  Silver nitrate and Direct pressure        "     Assessment and Plan   Diagnoses and all orders for this visit:    1. Venous stasis ulcer of left calf limited to breakdown of skin, unspecified whether varicose veins present (HCC) (Primary)  -     silver sulfadiazine (Silvadene) 1 % cream; Apply 1 application topically to the appropriate area as directed Daily.  Dispense: 400 g; Refill: 3    2. Venous stasis ulcer of right calf limited to breakdown of skin, unspecified whether varicose veins present (HCC)  -     silver sulfadiazine (Silvadene) 1 % cream; Apply 1 application topically to the appropriate area as directed Daily.  Dispense: 400 g; Refill: 3    3. Ulcer of great toe, right, limited to breakdown of skin (HCC)  -     silver sulfadiazine (Silvadene) 1 % cream; Apply 1 application topically to the appropriate area as directed Daily.  Dispense: 400 g; Refill: 3    4. Venous stasis of both lower extremities    5. Pedal edema    6. Type 2 diabetes mellitus with foot ulcer, with long-term current use of insulin (HCC)    Other orders  -     Wound debridement      Thin layer of Silvadene to all wounds including right great toe. Wrap BLE with Kerlix and Ace wrap at ALL times. Elevate legs at all times while seated or lying down. Patient is difficult to treat because she does not take very good care of herself and is resistant to most wound care modalities we recommend.  I think Unna boots would be the most helpful for her but she is adamant that she will absolutely not consider it. Return in 4 weeks for recheck.     Patient was given instructions and counseling regarding their condition or for health maintenance advice, as well as the wound care plan and recommendations. They understand and agree with the plan.  They will follow back up here in the clinic but are instructed to contact us in the interim should they have any new, returning, or worsening symptoms or concerns. Please see specific information pulled into the AVS if appropriate.       Follow Up    Return in about 4 weeks (around 12/9/2021) for Recheck.      Chelo Villeda MD

## 2021-11-19 ENCOUNTER — TELEPHONE (OUTPATIENT)
Dept: INTERNAL MEDICINE | Facility: CLINIC | Age: 56
End: 2021-11-19

## 2021-11-19 NOTE — TELEPHONE ENCOUNTER
Caller: Isha Llanes    Relationship: Self    Best call back number: [phone]    What medication are you requesting: STEROID PACK    What are your current symptoms: A LOT OF PAIN IN JOINTS FROM ARTHRITIS    How long have you been experiencing symptoms: ABOUT WEEK    Have you had these symptoms before:    [x] Yes  [] No    Have you been treated for these symptoms before:   [x] Yes  [] No    If a prescription is needed, what is your preferred pharmacy and phone number:    [id number] - [place] - [address] [facility] - [phone]  - [phone]  [phone]

## 2021-11-24 ENCOUNTER — TELEPHONE (OUTPATIENT)
Dept: GASTROENTEROLOGY | Facility: CLINIC | Age: 56
End: 2021-11-24

## 2021-11-24 NOTE — TELEPHONE ENCOUNTER
Patient has not been seen in our clinic and has not established care.  It looks like there is an appointment scheduled on 12/13/2021.  Reports indicate that she has been discharged from the former primary care.  Please instruct patient to go to the emergency room or urgent care.  I am unsure of when patient was discharged from her former primary care as there may still be a 30-day window where services need to be rendered for emergencies.  If not patient will have to go to emergency room or urgent care.

## 2021-12-01 ENCOUNTER — TELEPHONE (OUTPATIENT)
Dept: GASTROENTEROLOGY | Facility: CLINIC | Age: 56
End: 2021-12-01

## 2021-12-01 NOTE — TELEPHONE ENCOUNTER
Patient has called the office and states she is sick, and did not get the covid test. She wants to r/s her colon and egd from 12/03 but she states due to feeling knots she doesn't think she should wait until Feb for next available. Please advise on scheduling.

## 2021-12-01 NOTE — TELEPHONE ENCOUNTER
OK to schedule before the end of the year.  Please do not overbook on a Tuesday.  Please pick least overbooked day.  She will need her COVID test prior.

## 2021-12-03 NOTE — TELEPHONE ENCOUNTER
Attempted to contact pt to schedule patient's colonoscopy. Left a message requesting a return call.

## 2021-12-06 NOTE — TELEPHONE ENCOUNTER
Patient has been scheduled for 12/29/2021 with an arrival time of 1230.   Covid test has been scheduled for 12/27/2021 at 330.   Left information on voicemail per patient request.

## 2021-12-14 ENCOUNTER — TELEPHONE (OUTPATIENT)
Dept: FAMILY MEDICINE CLINIC | Facility: CLINIC | Age: 56
End: 2021-12-14

## 2021-12-14 NOTE — TELEPHONE ENCOUNTER
PATIENT MISSED APPOINTMENT ON 12/13/2021 @ 0915 WITH DR GALE. CALLED NUMBER IN CHART-NO ANSWER-LEFT MESSAGE EXPLAINING POLICY CONCERNING MISSED APPOINTMENTS AND SAME DAY CANCELLATIONS

## 2021-12-15 ENCOUNTER — TELEPHONE (OUTPATIENT)
Dept: FAMILY MEDICINE CLINIC | Facility: CLINIC | Age: 56
End: 2021-12-15

## 2021-12-15 NOTE — TELEPHONE ENCOUNTER
PATIENT MISSED APPOINTMENT ON 12/13/2021 @ 0915 WITH DR. GALE. CALLED NUMBER IN CHART, NO ANSWER - LEFT MESSAGE EXPLAINING MISSED APPOINTMENTS AND SAME DAY CANCELLATIONS.   SECOND ATTEMPT-SENDING LETTER

## 2021-12-21 ENCOUNTER — HOSPITAL ENCOUNTER (EMERGENCY)
Facility: HOSPITAL | Age: 56
Discharge: HOME | End: 2021-12-21
Attending: EMERGENCY MEDICINE | Admitting: EMERGENCY MEDICINE

## 2021-12-21 VITALS
HEART RATE: 98 BPM | TEMPERATURE: 98.6 F | BODY MASS INDEX: 27.03 KG/M2 | SYSTOLIC BLOOD PRESSURE: 115 MMHG | HEIGHT: 66 IN | RESPIRATION RATE: 18 BRPM | OXYGEN SATURATION: 92 % | DIASTOLIC BLOOD PRESSURE: 68 MMHG | WEIGHT: 168.21 LBS

## 2021-12-21 DIAGNOSIS — L97.801 VENOUS STASIS ULCER OF OTHER PART OF LOWER LEG LIMITED TO BREAKDOWN OF SKIN, UNSPECIFIED LATERALITY, UNSPECIFIED WHETHER VARICOSE VEINS PRESENT (HCC): ICD-10-CM

## 2021-12-21 DIAGNOSIS — L03.119 CELLULITIS OF LOWER EXTREMITY, UNSPECIFIED LATERALITY: Primary | ICD-10-CM

## 2021-12-21 DIAGNOSIS — I87.2 VENOUS STASIS DERMATITIS OF BOTH LOWER EXTREMITIES: ICD-10-CM

## 2021-12-21 DIAGNOSIS — I83.008 VENOUS STASIS ULCER OF OTHER PART OF LOWER LEG LIMITED TO BREAKDOWN OF SKIN, UNSPECIFIED LATERALITY, UNSPECIFIED WHETHER VARICOSE VEINS PRESENT (HCC): ICD-10-CM

## 2021-12-21 LAB
ALBUMIN SERPL-MCNC: 4 G/DL (ref 3.5–5.2)
ALBUMIN/GLOB SERPL: 1.1 G/DL
ALP SERPL-CCNC: 92 U/L (ref 39–117)
ALT SERPL W P-5'-P-CCNC: 8 U/L (ref 1–33)
ANION GAP SERPL CALCULATED.3IONS-SCNC: 10.1 MMOL/L (ref 5–15)
AST SERPL-CCNC: 12 U/L (ref 1–32)
BASOPHILS # BLD AUTO: 0.01 10*3/MM3 (ref 0–0.2)
BASOPHILS NFR BLD AUTO: 0.2 % (ref 0–1.5)
BILIRUB SERPL-MCNC: <0.2 MG/DL (ref 0–1.2)
BUN SERPL-MCNC: 11 MG/DL (ref 6–20)
BUN/CREAT SERPL: 23.4 (ref 7–25)
CALCIUM SPEC-SCNC: 8.9 MG/DL (ref 8.6–10.5)
CHLORIDE SERPL-SCNC: 100 MMOL/L (ref 98–107)
CO2 SERPL-SCNC: 28.9 MMOL/L (ref 22–29)
CREAT SERPL-MCNC: 0.47 MG/DL (ref 0.57–1)
CRP SERPL-MCNC: 4.08 MG/DL (ref 0–0.5)
D-LACTATE SERPL-SCNC: 1.5 MMOL/L (ref 0.5–2)
DEPRECATED RDW RBC AUTO: 45.2 FL (ref 37–54)
EOSINOPHIL # BLD AUTO: 0.11 10*3/MM3 (ref 0–0.4)
EOSINOPHIL NFR BLD AUTO: 1.9 % (ref 0.3–6.2)
ERYTHROCYTE [DISTWIDTH] IN BLOOD BY AUTOMATED COUNT: 15 % (ref 12.3–15.4)
ERYTHROCYTE [SEDIMENTATION RATE] IN BLOOD: 61 MM/HR (ref 0–30)
GFR SERPL CREATININE-BSD FRML MDRD: 137 ML/MIN/1.73
GLOBULIN UR ELPH-MCNC: 3.8 GM/DL
GLUCOSE SERPL-MCNC: 177 MG/DL (ref 65–99)
HCT VFR BLD AUTO: 40.2 % (ref 34–46.6)
HGB BLD-MCNC: 12.4 G/DL (ref 12–15.9)
HOLD SPECIMEN: NORMAL
HOLD SPECIMEN: NORMAL
IMM GRANULOCYTES # BLD AUTO: 0.01 10*3/MM3 (ref 0–0.05)
IMM GRANULOCYTES NFR BLD AUTO: 0.2 % (ref 0–0.5)
LYMPHOCYTES # BLD AUTO: 0.87 10*3/MM3 (ref 0.7–3.1)
LYMPHOCYTES NFR BLD AUTO: 15.3 % (ref 19.6–45.3)
MCH RBC QN AUTO: 25.4 PG (ref 26.6–33)
MCHC RBC AUTO-ENTMCNC: 30.8 G/DL (ref 31.5–35.7)
MCV RBC AUTO: 82.2 FL (ref 79–97)
MONOCYTES # BLD AUTO: 0.5 10*3/MM3 (ref 0.1–0.9)
MONOCYTES NFR BLD AUTO: 8.8 % (ref 5–12)
NEUTROPHILS NFR BLD AUTO: 4.17 10*3/MM3 (ref 1.7–7)
NEUTROPHILS NFR BLD AUTO: 73.6 % (ref 42.7–76)
NRBC BLD AUTO-RTO: 0 /100 WBC (ref 0–0.2)
PLATELET # BLD AUTO: 216 10*3/MM3 (ref 140–450)
PMV BLD AUTO: 10.2 FL (ref 6–12)
POTASSIUM SERPL-SCNC: 4.1 MMOL/L (ref 3.5–5.2)
PROT SERPL-MCNC: 7.8 G/DL (ref 6–8.5)
RBC # BLD AUTO: 4.89 10*6/MM3 (ref 3.77–5.28)
SODIUM SERPL-SCNC: 139 MMOL/L (ref 136–145)
WBC NRBC COR # BLD: 5.67 10*3/MM3 (ref 3.4–10.8)
WHOLE BLOOD HOLD SPECIMEN: NORMAL
WHOLE BLOOD HOLD SPECIMEN: NORMAL

## 2021-12-21 PROCEDURE — 87040 BLOOD CULTURE FOR BACTERIA: CPT | Performed by: EMERGENCY MEDICINE

## 2021-12-21 PROCEDURE — 36415 COLL VENOUS BLD VENIPUNCTURE: CPT

## 2021-12-21 PROCEDURE — 80053 COMPREHEN METABOLIC PANEL: CPT | Performed by: EMERGENCY MEDICINE

## 2021-12-21 PROCEDURE — 99284 EMERGENCY DEPT VISIT MOD MDM: CPT

## 2021-12-21 PROCEDURE — 25010000002 ONDANSETRON PER 1 MG: Performed by: NURSE PRACTITIONER

## 2021-12-21 PROCEDURE — 86140 C-REACTIVE PROTEIN: CPT | Performed by: NURSE PRACTITIONER

## 2021-12-21 PROCEDURE — 96374 THER/PROPH/DIAG INJ IV PUSH: CPT

## 2021-12-21 PROCEDURE — 85025 COMPLETE CBC W/AUTO DIFF WBC: CPT | Performed by: EMERGENCY MEDICINE

## 2021-12-21 PROCEDURE — 83605 ASSAY OF LACTIC ACID: CPT | Performed by: EMERGENCY MEDICINE

## 2021-12-21 PROCEDURE — 85652 RBC SED RATE AUTOMATED: CPT | Performed by: NURSE PRACTITIONER

## 2021-12-21 RX ORDER — ONDANSETRON 2 MG/ML
4 INJECTION INTRAMUSCULAR; INTRAVENOUS ONCE
Status: COMPLETED | OUTPATIENT
Start: 2021-12-21 | End: 2021-12-21

## 2021-12-21 RX ORDER — LEVOFLOXACIN 500 MG/1
500 TABLET, FILM COATED ORAL DAILY
Qty: 7 TABLET | Refills: 0 | Status: ON HOLD | OUTPATIENT
Start: 2021-12-21 | End: 2021-12-29

## 2021-12-21 RX ORDER — LEVOFLOXACIN 5 MG/ML
750 INJECTION, SOLUTION INTRAVENOUS ONCE
Status: DISCONTINUED | OUTPATIENT
Start: 2021-12-21 | End: 2021-12-21

## 2021-12-21 RX ORDER — SODIUM CHLORIDE 0.9 % (FLUSH) 0.9 %
10 SYRINGE (ML) INJECTION AS NEEDED
Status: DISCONTINUED | OUTPATIENT
Start: 2021-12-21 | End: 2021-12-21 | Stop reason: HOSPADM

## 2021-12-21 RX ORDER — LEVOFLOXACIN 750 MG/1
750 TABLET ORAL ONCE
Status: COMPLETED | OUTPATIENT
Start: 2021-12-21 | End: 2021-12-21

## 2021-12-21 RX ADMIN — ONDANSETRON 4 MG: 2 INJECTION INTRAMUSCULAR; INTRAVENOUS at 04:14

## 2021-12-21 RX ADMIN — LEVOFLOXACIN 750 MG: 750 TABLET, FILM COATED ORAL at 04:14

## 2021-12-21 RX ADMIN — SODIUM CHLORIDE 500 ML: 9 INJECTION, SOLUTION INTRAVENOUS at 04:14

## 2021-12-21 NOTE — DISCHARGE INSTRUCTIONS
Rest, elevate your extremities. Take your meds as prescribed and complete your antibiotics. Change her dressings daily and keep the Ace wraps over your dressing. Call Dr. Galicia's office today to follow-up within 2 days for further evaluation and treatment. Call the wound care center advised them of your increased swelling redness in your new open wounds to the anterior aspect of your legs and follow-up with them on Thursday so they may reevaluate your lower extremities. Return to the emergency department for any acutely developing fever, any increased swelling or any new or worse concerns.

## 2021-12-21 NOTE — ED PROVIDER NOTES
Subjective   The patient presents to the emergency department complaining of worsening lower extremity wounds and swelling.  She states that she has venous insufficiency with chronic venous dermatitis and ulcers.  She states that she has been treated at the wound care center for her chronic wounds to the posterior aspects of her bilateral lower extremities.  She states that 2 days ago she had wounds that opened up bilaterally on the anterior aspect of her lower extremities.  She states that these have been weeping and draining ever since then.  She reports that yesterday she noticed that her legs were more red and hot than normal.  She states that she has been having some transportation issues with [employer] and had missed her last doctor's appointment and wound care appointment.  She denies any recent fevers.  She states that she has had some nausea but no vomiting.  She is neurovascular intact.  The reports that she has been keeping her dressings on and Ace wraps but did present to the hospital tonight without them.  She is allergic to numerous antibiotics.  She also states that she is not an Unna boot candidate due to some issues that she has with the Unna boot.          Review of Systems   Constitutional: Negative for chills and fever.   HENT: Negative for congestion, ear pain and sore throat.    Eyes: Negative for pain.   Respiratory: Negative for cough, chest tightness and shortness of breath.    Cardiovascular: Negative for chest pain.   Gastrointestinal: Positive for nausea. Negative for abdominal pain, diarrhea and vomiting.   Genitourinary: Negative for flank pain and hematuria.   Musculoskeletal: Positive for gait problem. Negative for joint swelling.   Skin: Positive for color change and wound. Negative for pallor.   Neurological: Negative for seizures and headaches.   All other systems reviewed and are negative.      Past Medical History:   Diagnosis Date   • Anxiety     NO CURRENT MEDICATION   • Aortic  stenosis, severe    • Arthritis    • Asthma    • Back pain    • Blood clotting disorder (HCC)    • Cancer associated pain    • Chronic low back pain with sciatica    • Diabetes mellitus ([facility])     TYPE 2   • Dyspnea on effort    • GERD (gastroesophageal reflux disease)    • Hiatal hernia    • History of degenerative disc disease    • History of kidney stones    • History of pulmonary embolus (PE)    • History of transfusion    • Hodgkin's lymphoma (HCC)    • Immobility    • Lupus (HCC)    • Mitral valve prolapse    • Pelvic pain    • Peripheral neuropathy    • Personal history of DVT (deep vein thrombosis)    • Presence of intrathecal pump    • Sacroiliac joint disease    • SI (sacroiliac) joint inflammation (HCC)    • Venous insufficiency        Allergies   Allergen Reactions   • Amoxicillin Shortness Of Breath   • Ceclor [Cefaclor] Shortness Of Breath   • Penicillins Shortness Of Breath   • Sulfa Antibiotics Rash   • Valium [Diazepam] Mental Status Change   • Ambien [Zolpidem] Unknown - Low Severity   • Aspirin GI Intolerance   • Ativan [Lorazepam] Unknown - Low Severity   • Benadryl [Diphenhydramine] Unknown - Low Severity   • Biaxin [Clarithromycin] Unknown - Low Severity   • Cefazolin Unknown - Low Severity   • Cephalexin Unknown - Low Severity   • Cephalosporins Unknown - Low Severity   • Clindamycin Unknown - Low Severity   • Compazine [Prochlorperazine Edisylate] Rash   • Contrast Dye Other (See Comments)     Caused pain in her arm   • Doxycycline GI Intolerance   • Eggs Or Egg-Derived Products Unknown - Low Severity   • Nsaids Unknown - Low Severity   • Phenergan [Promethazine Hcl] Rash   • Promethazine Unknown - Low Severity   • Vancomycin Itching       Past Surgical History:   Procedure Laterality Date   • BACK SURGERY      SPINAL FUSION    • BLADDER REPAIR     • CARDIAC CATHETERIZATION N/A 3/6/2019    Procedure: Valvuloplasty;  Surgeon: Wayne Johnson MD;  Location: [facility] INVASIVE LOCATION;   Service: Cardiology   • CARDIAC VALVE REPLACEMENT  2021   • CHOLECYSTECTOMY     • HYSTERECTOMY     • LYMPHADENECTOMY     • PACEMAKER IMPLANTATION     • PAIN PUMP INSERTION/REVISION N/A 10/18/2019    Procedure: PAIN PUMP INSERTION [facility] 10-18-19 [place];  Surgeon: Horace Rios MD;  Location: [facility];  Service: Pain Management   • PAIN PUMP INSERTION/REVISION N/A 11/23/2020    Procedure: pain pump removal;  Surgeon: Horace Rios MD;  Location: [facility];  Service: Pain Management;  Laterality: N/A;   • TONSILLECTOMY     • TUBAL ABDOMINAL LIGATION     • TUMOR REMOVAL         Family History   Problem Relation Age of Onset   • Pancreatic cancer Sister    • Pancreatic cancer Paternal Grandmother    • Malig Hyperthermia Neg Hx        Social History     Socioeconomic History   • Marital status:    Tobacco Use   • Smoking status: Current Every Day Smoker     Packs/day: 2.00     Years: 41.00     Pack years: 82.00     Types: Cigarettes   • Smokeless tobacco: Never Used   Vaping Use   • Vaping Use: Never used   Substance and Sexual Activity   • Alcohol use: No   • Drug use: No   • Sexual activity: Defer           Objective   Physical Exam  Vitals and nursing note reviewed.   Constitutional:       General: She is not in acute distress.     Appearance: Normal appearance. She is not toxic-appearing.   HENT:      Head: Normocephalic and atraumatic.   Eyes:      General: No scleral icterus.  Cardiovascular:      Rate and Rhythm: Normal rate and regular rhythm.      Pulses: Normal pulses.   Pulmonary:      Effort: Pulmonary effort is normal. No respiratory distress.   Abdominal:      General: Abdomen is flat.      Tenderness: There is no abdominal tenderness.   Musculoskeletal:         General: Swelling, tenderness and signs of injury present. Normal range of motion.      Cervical back: Normal range of motion.      Right lower leg: Edema present.      Left lower leg: Edema present.   Skin:      General: Skin is warm and dry.      Capillary Refill: Capillary refill takes less than 2 seconds.      Findings: Erythema and rash present.   Neurological:      General: No focal deficit present.      Mental Status: She is alert and oriented to person, place, and time. Mental status is at baseline.   Psychiatric:         Mood and Affect: Mood normal.         Procedures           ED Course  ED Course as of 12/21/21 0639   Tue Dec 21, 2021   033 I SPOKE WITH DR NICKERSON AND WE REVIEWED THE PT'S CASE AND LAB RESULTS.  HE FEELS THAT THE PT DOES NOT WARRANT ADMISSION AT THIS TIME AND RECOMMENDS OUT PATIENT ABX THERAPY WITH LEVOQUIN AND TO FOLLOW UP WITH WOUND CARE AND HER PCP.  [TC]      ED Course User Index  [TC] Yany Saul APRN                                                 MDM  Number of Diagnoses or Management Options  Cellulitis of lower extremity, unspecified laterality: new and requires workup  Venous stasis dermatitis of both lower extremities: established and worsening  Venous stasis ulcer of other part of lower leg limited to breakdown of skin, unspecified laterality, unspecified whether varicose veins present (HCC): established and worsening     Amount and/or Complexity of Data Reviewed  Clinical lab tests: reviewed    Risk of Complications, Morbidity, and/or Mortality  Presenting problems: low  Diagnostic procedures: low  Management options: low    Patient Progress  Patient progress: stable      Final diagnoses:   Cellulitis of lower extremity, unspecified laterality   Venous stasis dermatitis of both lower extremities   Venous stasis ulcer of other part of lower leg limited to breakdown of skin, unspecified laterality, unspecified whether varicose veins present (HCC)       ED Disposition  ED Disposition     ED Disposition Condition Comment    Discharge Stable           Horace Orr,   [address] [facility] [address]  [facility] [address]  [phone]    Call today  TO FOLLOW UP IN 2 DAYS    [facility]  [address] [place] [id number] [phone]  Call today  TO FOLLOW UP ON WEDNESDAY         Medication List      New Prescriptions    levoFLOXacin 500 MG tablet  Commonly known as: LEVAQUIN  Take 1 tablet by mouth Daily.        Changed    Flovent  MCG/ACT inhaler  Generic drug: fluticasone  inhale 2 puffs by mouth twice daily  What changed:   · how much to take  · when to take this     triamcinolone 0.1 % ointment  Commonly known as: KENALOG  Apply  topically to the appropriate area as directed 2 (Two) Times a Day. For one week for venous stasis dermatitis. Do not apply to open wounds  What changed: how much to take           Where to Get Your Medications      These medications were sent to [facility] - [place] - [address] - [phone]  - [phone] FX  [address] [facility] [place] [address]    Phone: [phone]   · levoFLOXacin 500 MG tablet          Yany Saul APRN  12/21/21 0374

## 2021-12-21 NOTE — ED NOTES
Per EMS: PT CALLED FROM HOME WITH COMPLAINT OF LEG WOUNDS OOZING, REDNESS AND INFLAMMATION      Deidra Tim, RN  12/21/21 0039

## 2021-12-22 ENCOUNTER — TELEPHONE (OUTPATIENT)
Dept: GASTROENTEROLOGY | Facility: CLINIC | Age: 56
End: 2021-12-22

## 2021-12-23 ENCOUNTER — OFFICE VISIT (OUTPATIENT)
Dept: WOUND CARE | Facility: HOSPITAL | Age: 56
End: 2021-12-23

## 2021-12-23 VITALS
HEART RATE: 87 BPM | SYSTOLIC BLOOD PRESSURE: 102 MMHG | DIASTOLIC BLOOD PRESSURE: 62 MMHG | TEMPERATURE: 96.3 F | RESPIRATION RATE: 16 BRPM

## 2021-12-23 DIAGNOSIS — I87.303 VENOUS HYPERTENSION OF BOTH LOWER EXTREMITIES: ICD-10-CM

## 2021-12-23 DIAGNOSIS — R60.0 PEDAL EDEMA: ICD-10-CM

## 2021-12-23 DIAGNOSIS — I87.8 VENOUS STASIS OF BOTH LOWER EXTREMITIES: ICD-10-CM

## 2021-12-23 DIAGNOSIS — Z91.199 NONCOMPLIANCE WITH TREATMENT PLAN: ICD-10-CM

## 2021-12-23 DIAGNOSIS — L97.221 VENOUS STASIS ULCER OF LEFT CALF LIMITED TO BREAKDOWN OF SKIN, UNSPECIFIED WHETHER VARICOSE VEINS PRESENT (HCC): Primary | ICD-10-CM

## 2021-12-23 DIAGNOSIS — L97.211 VENOUS STASIS ULCER OF RIGHT CALF LIMITED TO BREAKDOWN OF SKIN, UNSPECIFIED WHETHER VARICOSE VEINS PRESENT (HCC): ICD-10-CM

## 2021-12-23 DIAGNOSIS — I83.012 VENOUS STASIS ULCER OF RIGHT CALF LIMITED TO BREAKDOWN OF SKIN, UNSPECIFIED WHETHER VARICOSE VEINS PRESENT (HCC): ICD-10-CM

## 2021-12-23 DIAGNOSIS — I83.022 VENOUS STASIS ULCER OF LEFT CALF LIMITED TO BREAKDOWN OF SKIN, UNSPECIFIED WHETHER VARICOSE VEINS PRESENT (HCC): Primary | ICD-10-CM

## 2021-12-23 PROCEDURE — G0463 HOSPITAL OUTPT CLINIC VISIT: HCPCS | Performed by: EMERGENCY MEDICINE

## 2021-12-23 PROCEDURE — 99214 OFFICE O/P EST MOD 30 MIN: CPT | Performed by: EMERGENCY MEDICINE

## 2021-12-23 RX ORDER — GINSENG 100 MG
1 CAPSULE ORAL 2 TIMES DAILY
Qty: 453.9 G | Refills: 1 | Status: SHIPPED | OUTPATIENT
Start: 2021-12-23 | End: 2022-05-04 | Stop reason: SDUPTHER

## 2021-12-23 NOTE — SIGNIFICANT NOTE
Epithelial %: 0%    Exposed Bone: no    Exposed Tendon: no    Impression: worsening    Short Term Goals: INCREASE GRANULATION AND DECREASE SIZE

## 2021-12-23 NOTE — PROGRESS NOTES
Chief Complaint  Wound Check (BILATERAL LOWER LEGS(DIABETIC))    Subjective        Wound Check        Patient is a 55-year-old female with a history of venous stasis in BLE with recurrent ulcerations and cellulitis.  She has a history of bilateral venous stasis with recurrent ulcerations.  Unfortunately she is not very compliant with various wound care modalities.  She repeatedly and adamantly refuses Unna boots which would be the most helpful for her.  She says that she has tried them before and they are just way too itchy and she just cannot do it.  She is resistant to most suggestions.    She is supposed to be using Silvadene on her wounds and wrapping her legs consistently every day.  She is also supposed to be elevating them.  She says that she wraps her legs every day but they are never wrapped when she comes to the clinic.  She presented to the ED not long ago for complaints of increased pain and wounds and her legs were unwrapped at that time as well.  They put her on Levaquin for suspected cellulitis.  She says that she usually has her legs wrapped during the day but she takes them off at night.  However, she does not sleep lying down and therefore her legs are not elevated so we have advised her to keep them wrapped all the time and to change them every day.    Allergies:  Amoxicillin, Ceclor [cefaclor], Penicillins, Sulfa antibiotics, Valium [diazepam], Ambien [zolpidem], Aspirin, Ativan [lorazepam], Benadryl [diphenhydramine], Biaxin [clarithromycin], Cefazolin, Cephalexin, Cephalosporins, Clindamycin, Compazine [prochlorperazine edisylate], Contrast dye, Doxycycline, Eggs or egg-derived products, Nsaids, Phenergan [promethazine hcl], Promethazine, and Vancomycin      Current Outpatient Medications:   •  acetaminophen (TYLENOL) 500 MG tablet, Take 1,000-1,500 mg by mouth Every 6 (Six) Hours As Needed for Mild Pain ., Disp: , Rfl:   •  albuterol (PROVENTIL HFA;VENTOLIN HFA) 108 (90 Base) MCG/ACT inhaler,  Inhale 2 puffs Every 4 (Four) Hours As Needed for Wheezing., Disp: , Rfl:   •  albuterol sulfate  (90 Base) MCG/ACT inhaler, Inhale 1 puff Every 4 (Four) Hours As Needed., Disp: , Rfl:   •  B-D UF III MINI PEN NEEDLES 31G X 5 MM misc, , Disp: , Rfl:   •  bacitracin 500 UNIT/GM ointment, Apply 1 application topically to the appropriate area as directed 2 (Two) Times a Day., Disp: 453.9 g, Rfl: 1  •  clopidogrel (Plavix) 75 MG tablet, Take 1 tablet by mouth Daily., Disp: 30 tablet, Rfl: 0  •  Elastic Bandages & Supports (ACE Bandage Self-Adhering) misc, 1 each Daily., Disp: 60 each, Rfl: 0  •  Flovent  MCG/ACT inhaler, inhale 2 puffs by mouth twice daily (Patient taking differently: Inhale 1 puff 2 (Two) Times a Day.), Disp: 12 g, Rfl: 2  •  furosemide (LASIX) 40 MG tablet, Take 1 tablet by mouth Daily., Disp: 90 tablet, Rfl: 1  •  gabapentin (NEURONTIN) 300 MG capsule, TAKE ONE CAPSULE BY MOUTH THREE TIMES DAILY (Patient taking differently: Take 300 mg by mouth 3 (Three) Times a Day.), Disp: 270 capsule, Rfl: 1  •  HYDROmorphone (DILAUDID) 1 mg/mL solution, Infuse  into a venous catheter Dose Adjusted By Provider As Needed. PER PAIN PUMP, Disp: , Rfl:   •  hydrOXYzine (ATARAX) 50 MG tablet, Take 50 mg by mouth 4 (Four) Times a Day., Disp: , Rfl:   •  Insulin Lispro (ADMELOG SOLOSTAR) 100 UNIT/ML injection pen, Inject 12 Units under the skin into the appropriate area as directed Daily. SLIDING SCALE R/T BS. Takes 2-12 units tid, Disp: , Rfl:   •  Insulin Lispro, 1 Unit Dial, (HUMALOG) 100 UNIT/ML solution pen-injector, inject TWO TO 12 UNITS THREE TIMES DAILY as directed, Disp: , Rfl:   •  ipratropium-albuterol (DUO-NEB) 0.5-2.5 mg/3 ml nebulizer, Inhale 1 ampule., Disp: , Rfl:   •  Lantus SoloStar 100 UNIT/ML injection pen, inject TEN TO 40 UNITS EVERY DAY as directed, Disp: , Rfl:   •  levoFLOXacin (LEVAQUIN) 500 MG tablet, Take 1 tablet by mouth Daily., Disp: 7 tablet, Rfl: 0  •  ondansetron (Zofran)  4 MG tablet, Take 1 tablet by mouth Every 8 (Eight) Hours As Needed for Nausea or Vomiting., Disp: 8 tablet, Rfl: 0  •  polyethylene glycol (GoLYTELY) 236 g solution, Starting at noon on day prior to procedure, drink 8 ounces every 30 minutes until all gone or stools are clear. May add flavor packet., Disp: 4000 mL, Rfl: 0  •  silver sulfadiazine (Silvadene) 1 % cream, Apply 1 application topically to the appropriate area as directed Daily., Disp: 400 g, Rfl: 3  •  silver sulfadiazine (Silvadene) 1 % cream, Apply 1 application topically to the appropriate area as directed Daily., Disp: 400 g, Rfl: 3  •  sodium chloride 0.9 % solution with HYDROmorphone (PF) 50 MG/5ML solution 0.2 mg/mL, Infuse 0-2 mg into a venous catheter Every 1 (One) Hour As Needed for Pain., Disp: , Rfl:   •  Sodium Sulfate-Mag Sulfate-KCl (Sutab) 0516-983-771 MG tablet, Take 188 mg by mouth Continuous., Disp: 24 tablet, Rfl: 0  •  triamcinolone (KENALOG) 0.1 % ointment, Apply  topically to the appropriate area as directed 2 (Two) Times a Day. For one week for venous stasis dermatitis. Do not apply to open wounds (Patient taking differently: Apply 1 application topically to the appropriate area as directed 2 (Two) Times a Day. For one week for venous stasis dermatitis. Do not apply to open wounds), Disp: 15 g, Rfl: 0    Current Facility-Administered Medications:   •  sterile water irrigation solution 500 mL, 500 mL, Irrigation, Once, Damari Cornejo PA-C    Past Medical History:   Diagnosis Date   • Anxiety     NO CURRENT MEDICATION   • Aortic stenosis, severe    • Arthritis    • Asthma    • Back pain    • Blood clotting disorder (HCC)    • Cancer associated pain    • Chronic low back pain with sciatica    • Diabetes mellitus (HCC)     TYPE 2   • Dyspnea on effort    • GERD (gastroesophageal reflux disease)    • Hiatal hernia    • History of degenerative disc disease    • History of kidney stones    • History of pulmonary embolus (PE)    •  History of transfusion    • Hodgkin's lymphoma (HCC)    • Immobility    • Lupus (HCC)    • Mitral valve prolapse    • Pelvic pain    • Peripheral neuropathy    • Personal history of DVT (deep vein thrombosis)    • Presence of intrathecal pump    • Sacroiliac joint disease    • SI (sacroiliac) joint inflammation (HCC)    • Venous insufficiency      Past Surgical History:   Procedure Laterality Date   • BACK SURGERY      SPINAL FUSION    • BLADDER REPAIR     • CARDIAC CATHETERIZATION N/A 3/6/2019    Procedure: Valvuloplasty;  Surgeon: Wayne Johnson MD;  Location: [facility] INVASIVE LOCATION;  Service: Cardiology   • CARDIAC VALVE REPLACEMENT  2021   • CHOLECYSTECTOMY     • HYSTERECTOMY     • LYMPHADENECTOMY     • PACEMAKER IMPLANTATION     • PAIN PUMP INSERTION/REVISION N/A 10/18/2019    Procedure: PAIN PUMP INSERTION [facility] 10-18-19 [place];  Surgeon: Horace Rios MD;  Location: [facility];  Service: Pain Management   • PAIN PUMP INSERTION/REVISION N/A 11/23/2020    Procedure: pain pump removal;  Surgeon: Horace Rios MD;  Location: [facility];  Service: Pain Management;  Laterality: N/A;   • TONSILLECTOMY     • TUBAL ABDOMINAL LIGATION     • TUMOR REMOVAL       Social History     Socioeconomic History   • Marital status:    Tobacco Use   • Smoking status: Current Every Day Smoker     Packs/day: 2.00     Years: 41.00     Pack years: 82.00     Types: Cigarettes   • Smokeless tobacco: Never Used   Vaping Use   • Vaping Use: Never used   Substance and Sexual Activity   • Alcohol use: No   • Drug use: No   • Sexual activity: Defer     Family History   Problem Relation Age of Onset   • Pancreatic cancer Sister    • Pancreatic cancer Paternal Grandmother    • Malig Hyperthermia Neg Hx          Objective     Vitals:    12/23/21 1105   BP: 102/62   BP Location: Left arm   Patient Position: Sitting   Pulse: 87   Resp: 16   Temp: 96.3 °F (35.7 °C)   TempSrc: Temporal   PainSc:   7    PainLoc: Leg     There is no height or weight on file to calculate BMI.    STEADI Fall Risk Assessment has not been completed.     Review of Systems     ROS:  No fevers, chills, sweats, or body aches. No shortness of breath.     I have reviewed the HPI and ROS as documented by MA/RN. Chelo Villeda MD    Physical Exam     NAD, sitting in wheelchair  Normocephalic, atraumatic  No respiratory distress, no cough  2+ pitting edema BLE  BLE with multiple shallow open ulcerations and pitting edema noted. Small amount of dried drainage. No evidence of cellulitis, no purulence, induration, fluctuance, active drainage, or heat.  Severe stasis changes BLE.  See photos for details.    LLE:                  RLE:                      Result Review :  The following data was reviewed by: Chelo Villeda MD on 12/23/2021:    Prior office notes and wound photos.  Recent ED visit, multiple recent labs.    Unable to include nursing wound documentation and measurements due to character limitations.             Assessment and Plan   Diagnoses and all orders for this visit:    1. Venous stasis ulcer of left calf limited to breakdown of skin, unspecified whether varicose veins present (HCC) (Primary)  -     bacitracin 500 UNIT/GM ointment; Apply 1 application topically to the appropriate area as directed 2 (Two) Times a Day.  Dispense: 453.9 g; Refill: 1    2. Venous stasis ulcer of right calf limited to breakdown of skin, unspecified whether varicose veins present (HCC)  -     bacitracin 500 UNIT/GM ointment; Apply 1 application topically to the appropriate area as directed 2 (Two) Times a Day.  Dispense: 453.9 g; Refill: 1    3. Venous stasis of both lower extremities    4. Pedal edema    5. Venous hypertension of both lower extremities    6. Noncompliance with treatment plan      Thin layer of bacitracin to all wounds BLE. Wrap BLE with Kerlix and Ace wrap at ALL times. Elevate legs at all times while seated or lying down.      Patient is extremely difficult to treat because she does not take very good care of herself and is resistant to most wound care modalities we recommend.  I think Unna boots would be the most helpful for her but she is adamant that she will absolutely not consider it.  She is very upset that her legs will not heal and says that she has even requested amputation of her legs in the past.  I have tried to explain to her repeatedly that these problems would improve and go away if she would consistently follow our recommendations or Imdur the discomfort of the Unna boots for a short period of time.  She has chronic comfort from her swelling and wounds so trying the Unna boots would be best but she will not do it.  I explained to her that managing the swelling is the only way her wounds will heal but she seems to just want a quick fix and unfortunately that is not an option for her condition.    We also provided her with the phone number for the vein care center in [place] if she wishes to see them and see if they have any further options to offer her.    Return in 4 weeks for recheck.     Patient was given instructions and counseling regarding their condition or for health maintenance advice, as well as the wound care plan and recommendations. They understand and agree with the plan.  They will follow back up here in the clinic but are instructed to contact us in the interim should they have any new, returning, or worsening symptoms or concerns. Please see specific information pulled into the AVS if appropriate.       Follow Up   Return in about 4 weeks (around 1/20/2022) for Recheck.      Chelo Villeda MD

## 2021-12-26 LAB
BACTERIA SPEC AEROBE CULT: NORMAL
BACTERIA SPEC AEROBE CULT: NORMAL

## 2021-12-29 ENCOUNTER — ANESTHESIA (OUTPATIENT)
Dept: GASTROENTEROLOGY | Facility: HOSPITAL | Age: 56
End: 2021-12-29

## 2021-12-29 ENCOUNTER — ANESTHESIA EVENT (OUTPATIENT)
Dept: GASTROENTEROLOGY | Facility: HOSPITAL | Age: 56
End: 2021-12-29

## 2021-12-29 ENCOUNTER — HOSPITAL ENCOUNTER (OUTPATIENT)
Facility: HOSPITAL | Age: 56
Setting detail: HOSPITAL OUTPATIENT SURGERY
Discharge: HOME | End: 2021-12-29
Attending: INTERNAL MEDICINE | Admitting: INTERNAL MEDICINE

## 2021-12-29 VITALS
SYSTOLIC BLOOD PRESSURE: 91 MMHG | RESPIRATION RATE: 18 BRPM | HEIGHT: 66 IN | OXYGEN SATURATION: 93 % | BODY MASS INDEX: 27.28 KG/M2 | DIASTOLIC BLOOD PRESSURE: 58 MMHG | WEIGHT: 169.75 LBS | HEART RATE: 82 BPM | TEMPERATURE: 97 F

## 2021-12-29 DIAGNOSIS — K21.9 GASTROESOPHAGEAL REFLUX DISEASE, UNSPECIFIED WHETHER ESOPHAGITIS PRESENT: ICD-10-CM

## 2021-12-29 DIAGNOSIS — Z85.71 HISTORY OF HODGKIN'S LYMPHOMA: ICD-10-CM

## 2021-12-29 DIAGNOSIS — R11.0 NAUSEA: ICD-10-CM

## 2021-12-29 DIAGNOSIS — R19.4 CHANGE IN BOWEL HABITS: ICD-10-CM

## 2021-12-29 LAB — GLUCOSE BLDC GLUCOMTR-MCNC: 119 MG/DL (ref 70–99)

## 2021-12-29 PROCEDURE — 43239 EGD BIOPSY SINGLE/MULTIPLE: CPT | Performed by: INTERNAL MEDICINE

## 2021-12-29 PROCEDURE — 88305 TISSUE EXAM BY PATHOLOGIST: CPT | Performed by: INTERNAL MEDICINE

## 2021-12-29 PROCEDURE — 45378 DIAGNOSTIC COLONOSCOPY: CPT | Performed by: INTERNAL MEDICINE

## 2021-12-29 PROCEDURE — 82962 GLUCOSE BLOOD TEST: CPT

## 2021-12-29 PROCEDURE — 25010000002 PROPOFOL 10 MG/ML EMULSION: Performed by: NURSE ANESTHETIST, CERTIFIED REGISTERED

## 2021-12-29 RX ORDER — SODIUM CHLORIDE 0.9 % (FLUSH) 0.9 %
10 SYRINGE (ML) INJECTION EVERY 12 HOURS SCHEDULED
Status: DISCONTINUED | OUTPATIENT
Start: 2021-12-29 | End: 2021-12-29 | Stop reason: HOSPADM

## 2021-12-29 RX ORDER — HEPARIN SODIUM (PORCINE) LOCK FLUSH IV SOLN 100 UNIT/ML 100 UNIT/ML
5 SOLUTION INTRAVENOUS AS NEEDED
Status: DISCONTINUED | OUTPATIENT
Start: 2021-12-29 | End: 2021-12-29 | Stop reason: HOSPADM

## 2021-12-29 RX ORDER — PROPOFOL 10 MG/ML
VIAL (ML) INTRAVENOUS AS NEEDED
Status: DISCONTINUED | OUTPATIENT
Start: 2021-12-29 | End: 2021-12-29 | Stop reason: SURG

## 2021-12-29 RX ORDER — SODIUM CHLORIDE 0.9 % (FLUSH) 0.9 %
20 SYRINGE (ML) INJECTION AS NEEDED
Status: DISCONTINUED | OUTPATIENT
Start: 2021-12-29 | End: 2021-12-29 | Stop reason: HOSPADM

## 2021-12-29 RX ORDER — SODIUM CHLORIDE 0.9 % (FLUSH) 0.9 %
10 SYRINGE (ML) INJECTION AS NEEDED
Status: DISCONTINUED | OUTPATIENT
Start: 2021-12-29 | End: 2021-12-29 | Stop reason: HOSPADM

## 2021-12-29 RX ORDER — LIDOCAINE HYDROCHLORIDE 20 MG/ML
INJECTION, SOLUTION INFILTRATION; PERINEURAL AS NEEDED
Status: DISCONTINUED | OUTPATIENT
Start: 2021-12-29 | End: 2021-12-29 | Stop reason: SURG

## 2021-12-29 RX ORDER — SODIUM CHLORIDE, SODIUM LACTATE, POTASSIUM CHLORIDE, CALCIUM CHLORIDE 600; 310; 30; 20 MG/100ML; MG/100ML; MG/100ML; MG/100ML
30 INJECTION, SOLUTION INTRAVENOUS CONTINUOUS
Status: DISCONTINUED | OUTPATIENT
Start: 2021-12-29 | End: 2021-12-29 | Stop reason: HOSPADM

## 2021-12-29 RX ORDER — SODIUM CHLORIDE, SODIUM LACTATE, POTASSIUM CHLORIDE, CALCIUM CHLORIDE 600; 310; 30; 20 MG/100ML; MG/100ML; MG/100ML; MG/100ML
INJECTION, SOLUTION INTRAVENOUS CONTINUOUS PRN
Status: DISCONTINUED | OUTPATIENT
Start: 2021-12-29 | End: 2021-12-29 | Stop reason: SURG

## 2021-12-29 RX ORDER — PHENYLEPHRINE HCL IN 0.9% NACL 1 MG/10 ML
SYRINGE (ML) INTRAVENOUS AS NEEDED
Status: DISCONTINUED | OUTPATIENT
Start: 2021-12-29 | End: 2021-12-29 | Stop reason: SURG

## 2021-12-29 RX ADMIN — PROPOFOL 200 MCG/KG/MIN: 10 INJECTION, EMULSION INTRAVENOUS at 14:02

## 2021-12-29 RX ADMIN — SODIUM CHLORIDE, POTASSIUM CHLORIDE, SODIUM LACTATE AND CALCIUM CHLORIDE: 600; 310; 30; 20 INJECTION, SOLUTION INTRAVENOUS at 14:02

## 2021-12-29 RX ADMIN — LIDOCAINE HYDROCHLORIDE 80 MG: 20 INJECTION, SOLUTION INFILTRATION; PERINEURAL at 14:06

## 2021-12-29 RX ADMIN — Medication 100 MCG: at 14:06

## 2021-12-29 RX ADMIN — SODIUM CHLORIDE, POTASSIUM CHLORIDE, SODIUM LACTATE AND CALCIUM CHLORIDE 30 ML/HR: 600; 310; 30; 20 INJECTION, SOLUTION INTRAVENOUS at 13:06

## 2021-12-29 RX ADMIN — Medication 100 MCG: at 14:11

## 2021-12-29 RX ADMIN — Medication 100 MCG: at 14:17

## 2021-12-29 RX ADMIN — PROPOFOL 50 MG: 10 INJECTION, EMULSION INTRAVENOUS at 14:02

## 2021-12-29 NOTE — ANESTHESIA POSTPROCEDURE EVALUATION
Patient: Isha Llanes    Procedure Summary     Date: 12/29/21 Room / Location: [facility] ENDOSCOPY 1 / [facility] ENDOSCOPY    Anesthesia Start: 1400 Anesthesia Stop: 1423    Procedures:       ESOPHAGOGASTRODUODENOSCOPY (N/A )      COLONOSCOPY (N/A ) Diagnosis:       Gastroesophageal reflux disease, unspecified whether esophagitis present      History of Hodgkin's lymphoma      Change in bowel habits      Nausea      (Gastroesophageal reflux disease, unspecified whether esophagitis present [K21.9])      (History of Hodgkin's lymphoma [Z85.71])      (Change in bowel habits [R19.4])      (Nausea [R11.0])    Surgeons: Karine Orlando MD Provider: Joie Obrien DO    Anesthesia Type: general ASA Status: 4          Anesthesia Type: general    Vitals  Vitals Value Taken Time   BP 77/38 12/29/21 1426   Temp 36.1 °C (97 °F) 12/29/21 1422   Pulse 79 12/29/21 1427   Resp 15 12/29/21 1426   SpO2 92 % 12/29/21 1427   Vitals shown include unvalidated device data.        Post Anesthesia Care and Evaluation    Patient location during evaluation: bedside  Patient participation: complete - patient participated  Level of consciousness: awake  Pain management: adequate  Airway patency: patent  Anesthetic complications: No anesthetic complications  PONV Status: none  Cardiovascular status: acceptable and stable  Respiratory status: acceptable  Hydration status: acceptable    Comments: An Anesthesiologist personally participated in the most demanding procedures (including induction and emergence if applicable) in the anesthesia plan, monitored the course of anesthesia administration at frequent intervals and remained physically present and available for immediate diagnosis and treatment of emergencies.

## 2021-12-29 NOTE — ANESTHESIA PREPROCEDURE EVALUATION
Anesthesia Evaluation     Patient summary reviewed and Nursing notes reviewed   no history of anesthetic complications:  NPO Solid Status: > 8 hours  NPO Liquid Status: > 2 hours           Airway   Mallampati: II  TM distance: >3 FB  Neck ROM: full  No difficulty expected  Dental    (+) edentulous    Pulmonary - normal exam    breath sounds clear to auscultation  (+) pulmonary embolism, a smoker Current, COPD, asthma,home oxygen, shortness of breath,   Cardiovascular - normal exam  Exercise tolerance: good (4-7 METS)    Rhythm: regular    (+) valvular problems/murmurs, CAD, CHF ,     ROS comment: Aortic valve repair 6 months    Neuro/Psych  (+) numbness, psychiatric history Anxiety and Depression,     GI/Hepatic/Renal/Endo    (+)  hiatal hernia, GERD,  liver disease, renal disease CRI, diabetes mellitus,     Musculoskeletal     (+) back pain,   Abdominal    Substance History - negative use     OB/GYN negative ob/gyn ROS         Other   arthritis,    history of cancer                    Anesthesia Plan    ASA 4     general   total IV anesthesia    Anesthetic plan, all risks, benefits, and alternatives have been provided, discussed and informed consent has been obtained with: patient.

## 2022-01-03 ENCOUNTER — TELEPHONE (OUTPATIENT)
Dept: GASTROENTEROLOGY | Facility: CLINIC | Age: 57
End: 2022-01-03

## 2022-01-03 ENCOUNTER — PREP FOR SURGERY (OUTPATIENT)
Dept: OTHER | Facility: HOSPITAL | Age: 57
End: 2022-01-03

## 2022-01-03 DIAGNOSIS — R19.4 CHANGE IN BOWEL HABITS: Primary | ICD-10-CM

## 2022-01-03 LAB
CYTO UR: NORMAL
LAB AP CASE REPORT: NORMAL
LAB AP CLINICAL INFORMATION: NORMAL
PATH REPORT.FINAL DX SPEC: NORMAL
PATH REPORT.GROSS SPEC: NORMAL

## 2022-01-03 RX ORDER — POLYETHYLENE GLYCOL 3350, SODIUM CHLORIDE, SODIUM BICARBONATE, POTASSIUM CHLORIDE 420; 11.2; 5.72; 1.48 G/4L; G/4L; G/4L; G/4L
4000 POWDER, FOR SOLUTION ORAL ONCE
Qty: 4000 ML | Refills: 0 | Status: SHIPPED | OUTPATIENT
Start: 2022-01-03 | End: 2022-01-03

## 2022-01-03 NOTE — TELEPHONE ENCOUNTER
Please schedule for repeat colonoscopy sometime in the next 3 months due to poor prep.  Please reviewed with patient importance of bowel prep.  Recommend 1 bottle of mag citrate prior to beginning bowel prep.

## 2022-01-04 ENCOUNTER — TELEPHONE (OUTPATIENT)
Dept: GASTROENTEROLOGY | Facility: CLINIC | Age: 57
End: 2022-01-04

## 2022-01-04 NOTE — TELEPHONE ENCOUNTER
----- Message from LUZ Rene sent at 1/3/2022  4:41 PM EST -----  Biopsies are consistent with reflux esophagitis.  Continue current PPI therapy and schedule for follow-up.

## 2022-01-06 ENCOUNTER — TELEPHONE (OUTPATIENT)
Dept: GASTROENTEROLOGY | Facility: CLINIC | Age: 57
End: 2022-01-06

## 2022-01-06 NOTE — TELEPHONE ENCOUNTER
Patient has called the office and left a vm requesting a return call.   I attempted to contact the pt and left a vm requesting a return call.

## 2022-01-10 ENCOUNTER — TELEPHONE (OUTPATIENT)
Dept: WOUND CARE | Facility: HOSPITAL | Age: 57
End: 2022-01-10

## 2022-01-10 RX ORDER — PANTOPRAZOLE SODIUM 40 MG/1
40 TABLET, DELAYED RELEASE ORAL DAILY
Qty: 90 TABLET | Refills: 1 | Status: SHIPPED | OUTPATIENT
Start: 2022-01-10 | End: 2022-03-25

## 2022-01-10 RX ORDER — FAMOTIDINE 20 MG/1
20 TABLET, FILM COATED ORAL NIGHTLY PRN
Qty: 90 TABLET | Refills: 1 | Status: SHIPPED | OUTPATIENT
Start: 2022-01-10 | End: 2022-07-11

## 2022-01-10 NOTE — TELEPHONE ENCOUNTER
PATIENT CALLED TO LET US KNOW THAT SHE DID NOT GET ACE WRAPS IN HER SUPPLY ORDER FROM [facility]. I CALLED [facility]PL[facility] AND SPOKE WITH LIBRA AND ORDERED ACE WRAPS FOR PATIENT. LIBRA STATES THAT HER ORDER SHOULD SHIP OUT TODAY. PATIENT AWARE.

## 2022-01-10 NOTE — TELEPHONE ENCOUNTER
Spoke to pt an informed of Wanda ESCOBAR note.     Pt states that she does not take PPI. Office Visit from 07/14/2021 there is documentation for Protonix and Pepcid. Pt states she never received her Rx.   Please advise.     F/U scheduled for 05/19/2022 @ 1030. Apt reminder mailed.

## 2022-01-13 ENCOUNTER — OFFICE VISIT (OUTPATIENT)
Dept: FAMILY MEDICINE CLINIC | Facility: CLINIC | Age: 57
End: 2022-01-13

## 2022-01-13 VITALS
OXYGEN SATURATION: 97 % | BODY MASS INDEX: 22.92 KG/M2 | HEART RATE: 92 BPM | WEIGHT: 142.6 LBS | DIASTOLIC BLOOD PRESSURE: 82 MMHG | HEIGHT: 66 IN | TEMPERATURE: 97.6 F | SYSTOLIC BLOOD PRESSURE: 140 MMHG

## 2022-01-13 DIAGNOSIS — F41.1 GENERALIZED ANXIETY DISORDER: ICD-10-CM

## 2022-01-13 DIAGNOSIS — Z79.4 TYPE 2 DIABETES MELLITUS WITH HYPERGLYCEMIA, WITH LONG-TERM CURRENT USE OF INSULIN: Primary | ICD-10-CM

## 2022-01-13 DIAGNOSIS — Z79.01 LONG TERM CURRENT USE OF ANTICOAGULANT THERAPY: ICD-10-CM

## 2022-01-13 DIAGNOSIS — I87.303 VENOUS HYPERTENSION OF BOTH LOWER EXTREMITIES: ICD-10-CM

## 2022-01-13 DIAGNOSIS — I83.012 VENOUS STASIS ULCER OF RIGHT CALF, UNSPECIFIED ULCER STAGE, UNSPECIFIED WHETHER VARICOSE VEINS PRESENT: ICD-10-CM

## 2022-01-13 DIAGNOSIS — L97.219 VENOUS STASIS ULCER OF RIGHT CALF, UNSPECIFIED ULCER STAGE, UNSPECIFIED WHETHER VARICOSE VEINS PRESENT: ICD-10-CM

## 2022-01-13 DIAGNOSIS — F17.219 CIGARETTE NICOTINE DEPENDENCE WITH NICOTINE-INDUCED DISORDER: ICD-10-CM

## 2022-01-13 DIAGNOSIS — L97.229 VENOUS STASIS ULCER OF LEFT CALF, UNSPECIFIED ULCER STAGE, UNSPECIFIED WHETHER VARICOSE VEINS PRESENT: ICD-10-CM

## 2022-01-13 DIAGNOSIS — F32.A DEPRESSION, UNSPECIFIED DEPRESSION TYPE: ICD-10-CM

## 2022-01-13 DIAGNOSIS — I35.0 AORTIC STENOSIS, SEVERE: ICD-10-CM

## 2022-01-13 DIAGNOSIS — E11.65 TYPE 2 DIABETES MELLITUS WITH HYPERGLYCEMIA, WITH LONG-TERM CURRENT USE OF INSULIN: Primary | ICD-10-CM

## 2022-01-13 DIAGNOSIS — K21.9 GASTROESOPHAGEAL REFLUX DISEASE WITHOUT ESOPHAGITIS: ICD-10-CM

## 2022-01-13 DIAGNOSIS — I83.022 VENOUS STASIS ULCER OF LEFT CALF, UNSPECIFIED ULCER STAGE, UNSPECIFIED WHETHER VARICOSE VEINS PRESENT: ICD-10-CM

## 2022-01-13 DIAGNOSIS — Z74.09 LIMITED MOBILITY: ICD-10-CM

## 2022-01-13 DIAGNOSIS — J44.9 CHRONIC OBSTRUCTIVE PULMONARY DISEASE, UNSPECIFIED COPD TYPE: ICD-10-CM

## 2022-01-13 PROBLEM — Z91.199 NONCOMPLIANCE: Status: ACTIVE | Noted: 2022-01-13

## 2022-01-13 LAB
ALBUMIN SERPL-MCNC: 4.2 G/DL (ref 3.5–5.2)
ALBUMIN/GLOB SERPL: 1.3 G/DL
ALP SERPL-CCNC: 92 U/L (ref 39–117)
ALT SERPL W P-5'-P-CCNC: 11 U/L (ref 1–33)
ANION GAP SERPL CALCULATED.3IONS-SCNC: 7.6 MMOL/L (ref 5–15)
AST SERPL-CCNC: 11 U/L (ref 1–32)
BASOPHILS # BLD AUTO: 0.02 10*3/MM3 (ref 0–0.2)
BASOPHILS NFR BLD AUTO: 0.3 % (ref 0–1.5)
BILIRUB SERPL-MCNC: <0.2 MG/DL (ref 0–1.2)
BUN SERPL-MCNC: 15 MG/DL (ref 6–20)
BUN/CREAT SERPL: 28.8 (ref 7–25)
CALCIUM SPEC-SCNC: 9.1 MG/DL (ref 8.6–10.5)
CHLORIDE SERPL-SCNC: 100 MMOL/L (ref 98–107)
CHOLEST SERPL-MCNC: 126 MG/DL (ref 0–200)
CO2 SERPL-SCNC: 28.4 MMOL/L (ref 22–29)
CREAT SERPL-MCNC: 0.52 MG/DL (ref 0.57–1)
DEPRECATED RDW RBC AUTO: 42.3 FL (ref 37–54)
EOSINOPHIL # BLD AUTO: 0.13 10*3/MM3 (ref 0–0.4)
EOSINOPHIL NFR BLD AUTO: 1.9 % (ref 0.3–6.2)
ERYTHROCYTE [DISTWIDTH] IN BLOOD BY AUTOMATED COUNT: 15 % (ref 12.3–15.4)
GFR SERPL CREATININE-BSD FRML MDRD: 122 ML/MIN/1.73
GLOBULIN UR ELPH-MCNC: 3.3 GM/DL
GLUCOSE SERPL-MCNC: 195 MG/DL (ref 65–99)
HCT VFR BLD AUTO: 42 % (ref 34–46.6)
HDLC SERPL-MCNC: 25 MG/DL (ref 40–60)
HGB BLD-MCNC: 13.3 G/DL (ref 12–15.9)
IMM GRANULOCYTES # BLD AUTO: 0.03 10*3/MM3 (ref 0–0.05)
IMM GRANULOCYTES NFR BLD AUTO: 0.4 % (ref 0–0.5)
LDLC SERPL CALC-MCNC: 45 MG/DL (ref 0–100)
LDLC/HDLC SERPL: 1.06 {RATIO}
LYMPHOCYTES # BLD AUTO: 1.24 10*3/MM3 (ref 0.7–3.1)
LYMPHOCYTES NFR BLD AUTO: 18.3 % (ref 19.6–45.3)
MCH RBC QN AUTO: 25 PG (ref 26.6–33)
MCHC RBC AUTO-ENTMCNC: 31.7 G/DL (ref 31.5–35.7)
MCV RBC AUTO: 78.8 FL (ref 79–97)
MONOCYTES # BLD AUTO: 0.56 10*3/MM3 (ref 0.1–0.9)
MONOCYTES NFR BLD AUTO: 8.2 % (ref 5–12)
NEUTROPHILS NFR BLD AUTO: 4.81 10*3/MM3 (ref 1.7–7)
NEUTROPHILS NFR BLD AUTO: 70.9 % (ref 42.7–76)
NRBC BLD AUTO-RTO: 0 /100 WBC (ref 0–0.2)
PLATELET # BLD AUTO: 191 10*3/MM3 (ref 140–450)
PMV BLD AUTO: 10.5 FL (ref 6–12)
POTASSIUM SERPL-SCNC: 4.3 MMOL/L (ref 3.5–5.2)
PROT SERPL-MCNC: 7.5 G/DL (ref 6–8.5)
RBC # BLD AUTO: 5.33 10*6/MM3 (ref 3.77–5.28)
SODIUM SERPL-SCNC: 136 MMOL/L (ref 136–145)
TRIGL SERPL-MCNC: 373 MG/DL (ref 0–150)
TSH SERPL DL<=0.05 MIU/L-ACNC: 2.24 UIU/ML (ref 0.27–4.2)
VLDLC SERPL-MCNC: 56 MG/DL (ref 5–40)
WBC NRBC COR # BLD: 6.79 10*3/MM3 (ref 3.4–10.8)

## 2022-01-13 PROCEDURE — 80053 COMPREHEN METABOLIC PANEL: CPT | Performed by: NURSE PRACTITIONER

## 2022-01-13 PROCEDURE — 85025 COMPLETE CBC W/AUTO DIFF WBC: CPT | Performed by: NURSE PRACTITIONER

## 2022-01-13 PROCEDURE — 84443 ASSAY THYROID STIM HORMONE: CPT | Performed by: NURSE PRACTITIONER

## 2022-01-13 PROCEDURE — 80061 LIPID PANEL: CPT | Performed by: NURSE PRACTITIONER

## 2022-01-13 PROCEDURE — 83036 HEMOGLOBIN GLYCOSYLATED A1C: CPT | Performed by: NURSE PRACTITIONER

## 2022-01-13 PROCEDURE — 99204 OFFICE O/P NEW MOD 45 MIN: CPT | Performed by: NURSE PRACTITIONER

## 2022-01-13 RX ORDER — ALBUTEROL SULFATE 90 UG/1
2 AEROSOL, METERED RESPIRATORY (INHALATION) EVERY 4 HOURS PRN
Qty: 18 G | Refills: 2 | Status: SHIPPED | OUTPATIENT
Start: 2022-01-13 | End: 2022-03-28

## 2022-01-13 NOTE — PROGRESS NOTES
Chief Complaint  Establish care, multiple wounds on lower extremities, type 2 diabetes    Subjective          Isha Llanes presents to [facility]  History of Present Illness  Presents today to establish care. Previous PCP is Damari WEI. She reports she was dismissed due to missing appts. She has a history Venous stasis ulcer for left and right calf, Venous statis of both extremities, Pedal edema, Venous hypertension of both lower extremities, chronic pain and has a Dilaudid pain pump, type 2 diabetes, CHF, aortic valve stenosis, GERD, hepatic steatosis, history of lymphoma, lupus.  She uses oxygen as needed. When she is at the hospital she was discharged home on oxygen in September 2020.  U now, the utility room lester reviewing her chart she is noted to have noncompliance.  She had previously had home health but was dismissed due to noncompliance.    During office visit she keeps nodding forward as if she is falling asleep.  No possible to high-dose Dilaudid.  Patient is adamant that that was ruled out with the pain management.    She has chronic leg pain.  She has multiple ulcers on her lower extremities bilaterally.  She last saw wound care Dr. Villeda on 12/23/2021.  She highly recommends for the patient to use unna boots.  He refuses to use the unna boots.  Wound care wanted to see her back in office in 1 month.  See images below for her ulcerated wounds on her lower extremities.    She sees endocrinologist Dr. Tejada type 2 diabetes.  She states her blood sugars have been elevated.  She also reports having a difficult time getting hold of her endocrinologist.  She is requesting a new diabetic doctor endocrinologist.    She smokes 2 packs of cigarettes per day.  She has no desire to quit smoking.     Social History     Socioeconomic History   • Marital status:    Tobacco Use   • Smoking status: Current Every Day Smoker     Packs/day: 2.00     Years: 41.00      "Pack years: 82.00     Types: Cigarettes   • Smokeless tobacco: Never Used   Vaping Use   • Vaping Use: Never used   Substance and Sexual Activity   • Alcohol use: No   • Drug use: No   • Sexual activity: Defer       Objective   Vital Signs:   /82   Pulse 92   Temp 97.6 °F (36.4 °C)   Ht 167.6 cm (65.98\")   Wt 64.7 kg (142 lb 9.6 oz)   SpO2 97%   BMI 23.03 kg/m²     Physical Exam  Vitals reviewed.   Constitutional:       Appearance: Normal appearance. She is well-developed.      Comments: Kept nodding off as if she was sleepy   HENT:      Head: Normocephalic and atraumatic.      Right Ear: External ear normal.      Left Ear: External ear normal.      Mouth/Throat:      Pharynx: No oropharyngeal exudate.   Eyes:      Conjunctiva/sclera: Conjunctivae normal.      Pupils: Pupils are equal, round, and reactive to light.   Cardiovascular:      Rate and Rhythm: Normal rate and regular rhythm.      Heart sounds: Murmur heard.    Systolic murmur is present.  No friction rub. No gallop.    Pulmonary:      Effort: Pulmonary effort is normal.      Breath sounds: Normal breath sounds. No wheezing or rhonchi.   Abdominal:      General: Bowel sounds are normal. There is no distension.      Palpations: Abdomen is soft.      Tenderness: There is no abdominal tenderness.   Musculoskeletal:      Cervical back: Normal range of motion and neck supple.      Right lower le+ Edema present.      Left lower le+ Edema present.      Comments: In a wheel chair   Skin:     General: Skin is warm and dry.      Findings: Wound present.      Comments: Several wounds and ulcers to lower extremities   Neurological:      Mental Status: She is alert and oriented to person, place, and time.   Psychiatric:         Mood and Affect: Mood and affect normal.         Speech: Speech normal.         Behavior: Behavior is cooperative.         Thought Content: Thought content does not include homicidal or suicidal ideation. Thought content does " not include homicidal or suicidal plan.                    Result Review :     Common labs    Common Labsle 9/8/21 9/8/21 12/21/21 12/21/21 1/13/22 1/13/22 1/13/22 1/13/22    0631 0631 0108 0108 1254 1254 1254 1254   Glucose  137 (A)  177 (A)   195 (A)    BUN  11  11   15    Creatinine  0.49 (A)  0.47 (A)   0.52 (A)    eGFR Non  Am  131  137   122    Sodium  136  139   136    Potassium  3.8  4.1   4.3    Chloride  102  100   100    Calcium  8.0 (A)  8.9   9.1    Albumin    4.00   4.20    Total Bilirubin    <0.2   <0.2    Alkaline Phosphatase    92   92    AST (SGOT)    12   11    ALT (SGPT)    8   11    WBC 4.66  5.67  6.79      Hemoglobin 11.4 (A)  12.4  13.3      Hematocrit 36.8  40.2  42.0      Platelets 123 (A)  216  191      Total Cholesterol      126     Triglycerides      373 (A)     HDL Cholesterol      25 (A)     LDL Cholesterol       45     Hemoglobin A1C        7.82 (A)   (A) Abnormal value            A1C Last 3 Results    HGBA1C Last 3 Results 4/26/21 1/13/22   Hemoglobin A1C 7.6 (A) 7.82 (A)   (A) Abnormal value       Comments are available for some flowsheets but are not being displayed.                       Assessment and Plan    Diagnoses and all orders for this visit:    1. Type 2 diabetes mellitus with hyperglycemia, with long-term current use of insulin (HCC) (Primary)  Assessment & Plan:  Diabetes is not controlled.  Hemoglobin A1c 7.2%.  States he is having difficult time getting a hold of him seeing endocrinologist Dr. Tejada will consult the Yoli ESCOBAR to help manage her type 2 diabetes.    Orders:  -     CBC & Differential  -     Comprehensive Metabolic Panel  -     Hemoglobin A1c  -     Lipid Panel  -     TSH Rfx On Abnormal To Free T4  -     Ambulatory Referral to [facility]    2. Gastroesophageal reflux disease without esophagitis  Assessment & Plan:  Reflux is well controlled with Pepcid 20 mg nightly.      3. Aortic stenosis, severe  Assessment &  Plan:  Continue follow-ups with Cardiologist      4. Long term current use of anticoagulant therapy  Assessment & Plan:  Continue plavix      5. Chronic obstructive pulmonary disease, unspecified COPD type (HCC)  Assessment & Plan:  reschedule appointment with pulmonologist.  Refill albuterol inhaler.        Orders:  -     albuterol sulfate  (90 Base) MCG/ACT inhaler; Inhale 2 puffs Every 4 (Four) Hours As Needed for Wheezing.  Dispense: 18 g; Refill: 2    6. Depression, unspecified depression type  Assessment & Plan:  Patient's depression is recurrent and is mild without psychosis. Their depression is currently active and the condition is unchanged. This will be reassessed in 4 weeks.      7. Generalized anxiety disorder  Assessment & Plan:  Psychological condition is unchanged.  Continue current treatment regimen.  Psychological condition  will be reassessed in 4 weeks.      8. Venous stasis ulcer of right calf, unspecified ulcer stage, unspecified whether varicose veins present (HCC)  Assessment & Plan:  Reschedule an appointment for follow-up with wound care.  Discussed Unna boots.  Patient declines using Unna boots.      9. Venous stasis ulcer of left calf, unspecified ulcer stage, unspecified whether varicose veins present (HCC)    10. Venous hypertension of both lower extremities    11. Cigarette nicotine dependence with nicotine-induced disorder  Assessment & Plan:  Tobacco use is unchanged.  Smoking cessation counseling was provided.  Tobacco use will be reassessed in 4 weeks.    Isha Llanes  reports that she has been smoking cigarettes. She has a 82.00 pack-year smoking history. She has never used smokeless tobacco.. I have educated her on the risk of diseases from using tobacco products such as cancer, COPD and heart disease.     I advised her to quit and she is not willing to quit.    I spent 4 minutes counseling the patient.             12. Limited mobility      Follow Up   Return in  about 4 weeks (around 2/10/2022), or if symptoms worsen or fail to improve, for Next scheduled follow up.  Patient was given instructions and counseling regarding her condition or for health maintenance advice. Please see specific information pulled into the AVS if appropriate.

## 2022-01-14 LAB — HBA1C MFR BLD: 7.82 % (ref 4.8–5.6)

## 2022-01-17 ENCOUNTER — TELEPHONE (OUTPATIENT)
Dept: FAMILY MEDICINE CLINIC | Facility: CLINIC | Age: 57
End: 2022-01-17

## 2022-01-18 ENCOUNTER — TELEPHONE (OUTPATIENT)
Dept: GASTROENTEROLOGY | Facility: CLINIC | Age: 57
End: 2022-01-18

## 2022-01-18 NOTE — ASSESSMENT & PLAN NOTE
Tobacco use is unchanged.  Smoking cessation counseling was provided.  Tobacco use will be reassessed in 4 weeks.    Isha Llanes  reports that she has been smoking cigarettes. She has a 82.00 pack-year smoking history. She has never used smokeless tobacco.. I have educated her on the risk of diseases from using tobacco products such as cancer, COPD and heart disease.     I advised her to quit and she is not willing to quit.    I spent 4 minutes counseling the patient.

## 2022-01-18 NOTE — TELEPHONE ENCOUNTER
Spoke to pt and informed April 13 2022 arrival time at 1030. Educated pt on clear liquids and bowel prep. Mailed Pt Colonoscopy Instructions. Pt states that her PCP handles her Plavix. Verified understanding.

## 2022-01-18 NOTE — ASSESSMENT & PLAN NOTE
Patient's depression is recurrent and is mild without psychosis. Their depression is currently active and the condition is unchanged. This will be reassessed in 4 weeks.

## 2022-01-18 NOTE — ASSESSMENT & PLAN NOTE
Reschedule an appointment for follow-up with wound care.  Discussed Unna boots.  Patient declines using Unna boots.

## 2022-01-18 NOTE — ASSESSMENT & PLAN NOTE
Diabetes is not controlled.  Hemoglobin A1c 7.2%.  States he is having difficult time getting a hold of him seeing endocrinologist Dr. Tejada will consult the [facility] ESCOBAR to help manage her type 2 diabetes.

## 2022-01-18 NOTE — ASSESSMENT & PLAN NOTE
Psychological condition is unchanged.  Continue current treatment regimen.  Psychological condition  will be reassessed in 4 weeks.

## 2022-01-21 ENCOUNTER — HOSPITAL ENCOUNTER (EMERGENCY)
Facility: HOSPITAL | Age: 57
Discharge: HOME | End: 2022-01-21
Attending: EMERGENCY MEDICINE | Admitting: EMERGENCY MEDICINE

## 2022-01-21 ENCOUNTER — APPOINTMENT (OUTPATIENT)
Dept: GENERAL RADIOLOGY | Facility: HOSPITAL | Age: 57
End: 2022-01-21

## 2022-01-21 VITALS
HEART RATE: 87 BPM | DIASTOLIC BLOOD PRESSURE: 74 MMHG | OXYGEN SATURATION: 93 % | TEMPERATURE: 99.6 F | HEIGHT: 67 IN | WEIGHT: 160 LBS | BODY MASS INDEX: 25.11 KG/M2 | SYSTOLIC BLOOD PRESSURE: 110 MMHG | RESPIRATION RATE: 20 BRPM

## 2022-01-21 DIAGNOSIS — L03.90 CELLULITIS, UNSPECIFIED CELLULITIS SITE: Primary | ICD-10-CM

## 2022-01-21 LAB
ALBUMIN SERPL-MCNC: 4.1 G/DL (ref 3.5–5.2)
ALBUMIN/GLOB SERPL: 1.2 G/DL
ALP SERPL-CCNC: 83 U/L (ref 39–117)
ALT SERPL W P-5'-P-CCNC: 13 U/L (ref 1–33)
ANION GAP SERPL CALCULATED.3IONS-SCNC: 9.5 MMOL/L (ref 5–15)
AST SERPL-CCNC: 21 U/L (ref 1–32)
BASOPHILS # BLD AUTO: 0.01 10*3/MM3 (ref 0–0.2)
BASOPHILS NFR BLD AUTO: 0.2 % (ref 0–1.5)
BILIRUB SERPL-MCNC: 0.2 MG/DL (ref 0–1.2)
BUN SERPL-MCNC: 14 MG/DL (ref 6–20)
BUN/CREAT SERPL: 21.5 (ref 7–25)
CALCIUM SPEC-SCNC: 8.7 MG/DL (ref 8.6–10.5)
CHLORIDE SERPL-SCNC: 97 MMOL/L (ref 98–107)
CK MB SERPL-CCNC: 1.43 NG/ML
CK SERPL-CCNC: 78 U/L (ref 20–180)
CO2 SERPL-SCNC: 27.5 MMOL/L (ref 22–29)
CREAT SERPL-MCNC: 0.65 MG/DL (ref 0.57–1)
D-LACTATE SERPL-SCNC: 1.1 MMOL/L (ref 0.5–2)
DEPRECATED RDW RBC AUTO: 49.1 FL (ref 37–54)
EOSINOPHIL # BLD AUTO: 0.01 10*3/MM3 (ref 0–0.4)
EOSINOPHIL NFR BLD AUTO: 0.2 % (ref 0.3–6.2)
ERYTHROCYTE [DISTWIDTH] IN BLOOD BY AUTOMATED COUNT: 16.5 % (ref 12.3–15.4)
GFR SERPL CREATININE-BSD FRML MDRD: 94 ML/MIN/1.73
GLOBULIN UR ELPH-MCNC: 3.4 GM/DL
GLUCOSE SERPL-MCNC: 154 MG/DL (ref 65–99)
HCT VFR BLD AUTO: 37.4 % (ref 34–46.6)
HGB BLD-MCNC: 11.7 G/DL (ref 12–15.9)
HOLD SPECIMEN: NORMAL
HOLD SPECIMEN: NORMAL
IMM GRANULOCYTES # BLD AUTO: 0.02 10*3/MM3 (ref 0–0.05)
IMM GRANULOCYTES NFR BLD AUTO: 0.5 % (ref 0–0.5)
LIPASE SERPL-CCNC: 23 U/L (ref 13–60)
LYMPHOCYTES # BLD AUTO: 1.29 10*3/MM3 (ref 0.7–3.1)
LYMPHOCYTES NFR BLD AUTO: 29.9 % (ref 19.6–45.3)
MAGNESIUM SERPL-MCNC: 1.9 MG/DL (ref 1.6–2.6)
MCH RBC QN AUTO: 25.7 PG (ref 26.6–33)
MCHC RBC AUTO-ENTMCNC: 31.3 G/DL (ref 31.5–35.7)
MCV RBC AUTO: 82.2 FL (ref 79–97)
MONOCYTES # BLD AUTO: 0.44 10*3/MM3 (ref 0.1–0.9)
MONOCYTES NFR BLD AUTO: 10.2 % (ref 5–12)
NEUTROPHILS NFR BLD AUTO: 2.55 10*3/MM3 (ref 1.7–7)
NEUTROPHILS NFR BLD AUTO: 59 % (ref 42.7–76)
NRBC BLD AUTO-RTO: 0 /100 WBC (ref 0–0.2)
NT-PROBNP SERPL-MCNC: 165.7 PG/ML (ref 0–900)
PLATELET # BLD AUTO: 126 10*3/MM3 (ref 140–450)
PMV BLD AUTO: 10.6 FL (ref 6–12)
POTASSIUM SERPL-SCNC: 4.6 MMOL/L (ref 3.5–5.2)
PROT SERPL-MCNC: 7.5 G/DL (ref 6–8.5)
QT INTERVAL: 361 MS
QT INTERVAL: 409 MS
RBC # BLD AUTO: 4.55 10*6/MM3 (ref 3.77–5.28)
SODIUM SERPL-SCNC: 134 MMOL/L (ref 136–145)
TROPONIN I SERPL-MCNC: 0.01 NG/ML (ref 0–0.6)
TROPONIN I SERPL-MCNC: 0.03 NG/ML (ref 0–0.6)
WBC NRBC COR # BLD: 4.32 10*3/MM3 (ref 3.4–10.8)
WHOLE BLOOD HOLD SPECIMEN: NORMAL
WHOLE BLOOD HOLD SPECIMEN: NORMAL

## 2022-01-21 PROCEDURE — 25010000002 MEROPENEM PER 100 MG: Performed by: EMERGENCY MEDICINE

## 2022-01-21 PROCEDURE — 99284 EMERGENCY DEPT VISIT MOD MDM: CPT

## 2022-01-21 PROCEDURE — 71045 X-RAY EXAM CHEST 1 VIEW: CPT

## 2022-01-21 PROCEDURE — 93005 ELECTROCARDIOGRAM TRACING: CPT | Performed by: EMERGENCY MEDICINE

## 2022-01-21 PROCEDURE — 82553 CREATINE MB FRACTION: CPT | Performed by: EMERGENCY MEDICINE

## 2022-01-21 PROCEDURE — 83735 ASSAY OF MAGNESIUM: CPT | Performed by: EMERGENCY MEDICINE

## 2022-01-21 PROCEDURE — 85025 COMPLETE CBC W/AUTO DIFF WBC: CPT | Performed by: EMERGENCY MEDICINE

## 2022-01-21 PROCEDURE — 84484 ASSAY OF TROPONIN QUANT: CPT

## 2022-01-21 PROCEDURE — 82550 ASSAY OF CK (CPK): CPT | Performed by: EMERGENCY MEDICINE

## 2022-01-21 PROCEDURE — 83880 ASSAY OF NATRIURETIC PEPTIDE: CPT | Performed by: EMERGENCY MEDICINE

## 2022-01-21 PROCEDURE — U0004 COV-19 TEST NON-CDC HGH THRU: HCPCS | Performed by: EMERGENCY MEDICINE

## 2022-01-21 PROCEDURE — 96365 THER/PROPH/DIAG IV INF INIT: CPT

## 2022-01-21 PROCEDURE — 87040 BLOOD CULTURE FOR BACTERIA: CPT | Performed by: EMERGENCY MEDICINE

## 2022-01-21 PROCEDURE — 80053 COMPREHEN METABOLIC PANEL: CPT | Performed by: EMERGENCY MEDICINE

## 2022-01-21 PROCEDURE — 83690 ASSAY OF LIPASE: CPT | Performed by: EMERGENCY MEDICINE

## 2022-01-21 PROCEDURE — 83605 ASSAY OF LACTIC ACID: CPT | Performed by: EMERGENCY MEDICINE

## 2022-01-21 RX ORDER — PREDNISONE 50 MG/1
50 TABLET ORAL DAILY
Qty: 5 TABLET | Refills: 0 | Status: SHIPPED | OUTPATIENT
Start: 2022-01-21 | End: 2022-03-04

## 2022-01-21 RX ORDER — ASPIRIN 81 MG/1
324 TABLET, CHEWABLE ORAL ONCE
Status: DISCONTINUED | OUTPATIENT
Start: 2022-01-21 | End: 2022-01-21 | Stop reason: HOSPADM

## 2022-01-21 RX ORDER — SODIUM CHLORIDE 0.9 % (FLUSH) 0.9 %
10 SYRINGE (ML) INJECTION AS NEEDED
Status: DISCONTINUED | OUTPATIENT
Start: 2022-01-21 | End: 2022-01-21 | Stop reason: HOSPADM

## 2022-01-21 RX ORDER — CEFTRIAXONE SODIUM 1 G/50ML
1 INJECTION, SOLUTION INTRAVENOUS ONCE
Status: DISCONTINUED | OUTPATIENT
Start: 2022-01-21 | End: 2022-01-21 | Stop reason: ALTCHOICE

## 2022-01-21 RX ORDER — DOXYCYCLINE HYCLATE 100 MG/1
100 TABLET, DELAYED RELEASE ORAL 2 TIMES DAILY
Qty: 20 TABLET | Refills: 0 | Status: SHIPPED | OUTPATIENT
Start: 2022-01-21 | End: 2022-03-04

## 2022-01-21 RX ADMIN — MEROPENEM 1 G: 1 INJECTION, POWDER, FOR SOLUTION INTRAVENOUS at 14:14

## 2022-01-21 NOTE — ED PROVIDER NOTES
Time: 12:43 PM EST  Arrived by: ambulance  Chief Complaint: cellulitis and covid  History provided by: patient  History is limited by: N/A     History of Present Illness:  Patient is a 56 y.o. year old female that presents to the emergency department with fever, chills, bilateral lower extremity pain and swelling.  She states that everyone in her household has COVID.  She reports that she is not vaccinated.  Patient also reports a cough and muscle aches.  Patient denies nausea and vomiting.  Patient denies diarrhea.  Patient denies dysuria and urinary frequency.      Chest Pain  Associated symptoms: no abdominal pain, no cough, no fever, no headache, no nausea, no palpitations, no shortness of breath and no vomiting    Cellulitis  Location:  Lower extremities  Severity:  Mild  Onset quality:  Gradual  Duration:  3 days  Timing:  Constant  Progression:  Worsening  Relieved by:  Nothing  Worsened by:  Nothing  Associated symptoms: chest pain    Associated symptoms: no abdominal pain, no congestion, no cough, no diarrhea, no fever, no headaches, no myalgias, no nausea, no rhinorrhea, no shortness of breath, no sore throat and no vomiting        Similar Symptoms Previously: no  Recently seen: no      Patient Care Team  Primary Care Provider: Vidal Luevano APRN    Past Medical History:     Allergies   Allergen Reactions   • Amoxicillin Shortness Of Breath   • Ceclor [Cefaclor] Shortness Of Breath   • Penicillins Shortness Of Breath   • Sulfa Antibiotics Rash   • Valium [Diazepam] Mental Status Change   • Ambien [Zolpidem] Unknown - Low Severity   • Aspirin GI Intolerance   • Ativan [Lorazepam] Unknown - Low Severity   • Benadryl [Diphenhydramine] Unknown - Low Severity   • Biaxin [Clarithromycin] Unknown - Low Severity   • Cefazolin Unknown - Low Severity   • Cephalexin Unknown - Low Severity   • Cephalosporins Unknown - Low Severity   • Clindamycin Unknown - Low Severity   • Compazine [Prochlorperazine Edisylate]  Rash   • Contrast Dye Other (See Comments)     Caused pain in her arm   • Doxycycline GI Intolerance   • Eggs Or Egg-Derived Products Unknown - Low Severity   • Nsaids Unknown - Low Severity   • Phenergan [Promethazine Hcl] Rash   • Promethazine Unknown - Low Severity   • Vancomycin Itching     Past Medical History:   Diagnosis Date   • Anxiety     NO CURRENT MEDICATION   • Aortic stenosis, severe    • Arthritis    • Asthma    • Back pain    • Blood clotting disorder (HCC)    • Cancer associated pain    • Chronic low back pain with sciatica    • Diabetes mellitus (HCC)     TYPE 2   • Dyspnea on effort    • GERD (gastroesophageal reflux disease)    • H/O lumpectomy    • H/O: hysterectomy    • Hiatal hernia    • History of degenerative disc disease    • History of kidney stones    • History of pulmonary embolus (PE)    • History of transfusion    • Hodgkin's lymphoma (HCC)    • Immobility    • Lupus (HCC)    • Mitral valve prolapse    • Pelvic pain    • Peripheral neuropathy    • Personal history of DVT (deep vein thrombosis)    • Presence of intrathecal pump    • Sacroiliac joint disease    • SI (sacroiliac) joint inflammation (HCC)    • Venous insufficiency      Past Surgical History:   Procedure Laterality Date   • BACK SURGERY      SPINAL FUSION    • BLADDER REPAIR     • CARDIAC CATHETERIZATION N/A 3/6/2019    Procedure: Valvuloplasty;  Surgeon: Wayne Johnson MD;  Location: [facility] INVASIVE LOCATION;  Service: Cardiology   • CARDIAC VALVE REPLACEMENT  2021   • CHOLECYSTECTOMY     • COLONOSCOPY N/A 12/29/2021    Procedure: COLONOSCOPY;  Surgeon: Karine Orlando MD;  Location: [facility] ENDOSCOPY;  Service: Gastroenterology;  Laterality: N/A;  POOR PREP   • ENDOSCOPY N/A 12/29/2021    Procedure: ESOPHAGOGASTRODUODENOSCOPY;  Surgeon: Karine Orlando MD;  Location: [facility] ENDOSCOPY;  Service: Gastroenterology;  Laterality: N/A;  ESOPHAGITIS, GASTRITIS, HIATAL HERNIA   • HYSTERECTOMY     •  LYMPHADENECTOMY     • PACEMAKER IMPLANTATION     • PAIN PUMP INSERTION/REVISION N/A 10/18/2019    Procedure: PAIN PUMP INSERTION [facility] 10-18-19 RIOS;  Surgeon: Horace Rios MD;  Location: [facility] MAIN OR;  Service: Pain Management   • PAIN PUMP INSERTION/REVISION N/A 11/23/2020    Procedure: pain pump removal;  Surgeon: Horace Rios MD;  Location: [facility] MAIN OR;  Service: Pain Management;  Laterality: N/A;   • TONSILLECTOMY     • TUBAL ABDOMINAL LIGATION     • TUMOR REMOVAL       Family History   Problem Relation Age of Onset   • Pancreatic cancer Sister    • Pancreatic cancer Paternal Grandmother    • Malig Hyperthermia Neg Hx        Home Medications:  Prior to Admission medications    Medication Sig Start Date End Date Taking? Authorizing Provider   acetaminophen (TYLENOL) 500 MG tablet Take 1,000-1,500 mg by mouth Every 6 (Six) Hours As Needed for Mild Pain .    Provider, MD Nette   albuterol sulfate  (90 Base) MCG/ACT inhaler Inhale 2 puffs Every 4 (Four) Hours As Needed for Wheezing. 1/13/22   Vidal Luevano APRN B-AILYN UF III MINI PEN NEEDLES 31G X 5 MM misc  9/25/21   Provider, MD Nette   bacitracin 500 UNIT/GM ointment Apply 1 application topically to the appropriate area as directed 2 (Two) Times a Day. 12/23/21   Chelo Villeda MD   clopidogrel (Plavix) 75 MG tablet Take 1 tablet by mouth Daily. 9/29/21   Damari Cornejo PA-C   Elastic Bandages & Supports (ACE Bandage Self-Adhering) misc 1 each Daily. 8/1/21   Damari Cornejo PA-C   famotidine (PEPCID) 20 MG tablet Take 1 tablet by mouth At Night As Needed for Heartburn. 1/10/22   Sobeida Espinosa APRN   Flovent  MCG/ACT inhaler inhale 2 puffs by mouth twice daily  Patient taking differently: Inhale 1 puff 2 (Two) Times a Day. 7/27/21   Damari Cornejo PA-C   furosemide (LASIX) 40 MG tablet Take 1 tablet by mouth Daily. 8/26/21   Damari Cornejo PA-C   gabapentin (NEURONTIN) 300 MG capsule TAKE  ONE CAPSULE BY MOUTH THREE TIMES DAILY  Patient taking differently: Take 300 mg by mouth 3 (Three) Times a Day. 2/22/21   Horace Rios MD   HYDROmorphone (DILAUDID) 1 mg/mL solution Infuse  into a venous catheter Dose Adjusted By Provider As Needed. PER PAIN PUMP    Nette Jiménez MD   hydrOXYzine (ATARAX) 50 MG tablet Take 50 mg by mouth 4 (Four) Times a Day. 6/29/21   Nette Jiménez MD   Insulin Lispro (ADMELOG SOLOSTAR) 100 UNIT/ML injection pen Inject 12 Units under the skin into the appropriate area as directed Daily. SLIDING SCALE R/T BS. Takes 2-12 units tid 8/26/20   Nette Jiménez MD   ipratropium-albuterol (DUO-NEB) 0.5-2.5 mg/3 ml nebulizer Inhale 1 ampule. 4/7/21   Nette Jiménez MD   ondansetron (Zofran) 4 MG tablet Take 1 tablet by mouth Every 8 (Eight) Hours As Needed for Nausea or Vomiting. 7/15/21   Damari Cornejo, KALPESH   pantoprazole (Protonix) 40 MG EC tablet Take 1 tablet by mouth Daily for 90 days. 1/10/22 4/10/22  Sobeida Espinosa APRN   silver sulfadiazine (Silvadene) 1 % cream Apply 1 application topically to the appropriate area as directed Daily. 10/5/21   Chelo Villeda MD   silver sulfadiazine (Silvadene) 1 % cream Apply 1 application topically to the appropriate area as directed Daily. 11/11/21   Chelo Villeda MD   sodium chloride 0.9 % solution with HYDROmorphone (PF) 50 MG/5ML solution 0.2 mg/mL Infuse 0-2 mg into a venous catheter Every 1 (One) Hour As Needed for Pain.    Provider, MD Nette   triamcinolone (KENALOG) 0.1 % ointment Apply  topically to the appropriate area as directed 2 (Two) Times a Day. For one week for venous stasis dermatitis. Do not apply to open wounds  Patient taking differently: Apply 1 application topically to the appropriate area as directed 2 (Two) Times a Day. For one week for venous stasis dermatitis. Do not apply to open wounds 8/31/21   Maged Schwab MD        Social History:   Social  "History     Tobacco Use   • Smoking status: Current Every Day Smoker     Packs/day: 2.00     Years: 41.00     Pack years: 82.00     Types: Cigarettes   • Smokeless tobacco: Never Used   Vaping Use   • Vaping Use: Never used   Substance Use Topics   • Alcohol use: No   • Drug use: No     Recent travel: no     Review of Systems:  Review of Systems   Constitutional: Negative for chills and fever.   HENT: Negative for congestion, rhinorrhea and sore throat.    Eyes: Negative for pain and visual disturbance.   Respiratory: Negative for apnea, cough, chest tightness and shortness of breath.    Cardiovascular: Positive for chest pain. Negative for palpitations.   Gastrointestinal: Negative for abdominal pain, diarrhea, nausea and vomiting.   Genitourinary: Negative for difficulty urinating and dysuria.   Musculoskeletal: Negative for joint swelling and myalgias.   Skin: Negative for color change.   Neurological: Negative for seizures and headaches.   Psychiatric/Behavioral: Negative.    All other systems reviewed and are negative.       Physical Exam:  /63 (BP Location: Right arm, Patient Position: Lying)   Pulse 82   Temp 99.6 °F (37.6 °C) (Oral)   Resp 20   Ht 170.2 cm (67\")   Wt 72.6 kg (160 lb)   SpO2 97%   BMI 25.06 kg/m²     Physical Exam  Vitals and nursing note reviewed.   Constitutional:       General: She is not in acute distress.     Appearance: Normal appearance. She is not toxic-appearing.   HENT:      Head: Normocephalic and atraumatic.      Jaw: There is normal jaw occlusion.   Eyes:      General: Lids are normal.      Extraocular Movements: Extraocular movements intact.      Conjunctiva/sclera: Conjunctivae normal.      Pupils: Pupils are equal, round, and reactive to light.   Cardiovascular:      Rate and Rhythm: Normal rate and regular rhythm.      Pulses: Normal pulses.      Heart sounds: Normal heart sounds.   Pulmonary:      Effort: Pulmonary effort is normal. No respiratory distress.      " Breath sounds: Normal breath sounds. No wheezing or rhonchi.   Abdominal:      General: Abdomen is flat.      Palpations: Abdomen is soft.      Tenderness: There is no abdominal tenderness. There is no guarding or rebound.   Musculoskeletal:         General: Normal range of motion.      Cervical back: Normal range of motion and neck supple.      Right lower leg: Tenderness present. Edema present.      Left lower leg: Tenderness present. Edema present.   Skin:     General: Skin is warm and dry.   Neurological:      Mental Status: She is alert and oriented to person, place, and time. Mental status is at baseline.   Psychiatric:         Mood and Affect: Mood normal.                Medications in the Emergency Department:  Medications   sodium chloride 0.9 % flush 10 mL (has no administration in time range)   aspirin chewable tablet 324 mg (324 mg Oral Not Given 1/21/22 1307)   meropenem (MERREM) 1 g/100 mL 0.9% NS (mbp) (0 g Intravenous Stopped 1/21/22 1444)        Labs  Lab Results (last 24 hours)     Procedure Component Value Units Date/Time    POC Troponin I [[id number]]  (Normal) Collected: 01/21/22 1257    Specimen: Blood Updated: 01/21/22 1311     Troponin I 0.01 ng/mL      Comment: Serial Number: [id number]:  [id number]       CBC & Differential [[id number]]  (Abnormal) Collected: 01/21/22 1304    Specimen: Blood Updated: 01/21/22 1431    Narrative:      The following orders were created for panel order CBC & Differential.  Procedure                               Abnormality         Status                     ---------                               -----------         ------                     CBC Auto Differential[[id number]]        Abnormal            Final result               Scan Slide[[id number]]                                                                    Please view results for these tests on the individual orders.    Comprehensive Metabolic Panel [[id number]]  (Abnormal) Collected: 01/21/22 1304     Specimen: Blood Updated: 01/21/22 1335     Glucose 154 mg/dL      BUN 14 mg/dL      Creatinine 0.65 mg/dL      Sodium 134 mmol/L      Potassium 4.6 mmol/L      Comment: Slight hemolysis detected by analyzer. Results may be affected.        Chloride 97 mmol/L      CO2 27.5 mmol/L      Calcium 8.7 mg/dL      Total Protein 7.5 g/dL      Albumin 4.10 g/dL      ALT (SGPT) 13 U/L      AST (SGOT) 21 U/L      Comment: Slight hemolysis detected by analyzer. Results may be affected.        Alkaline Phosphatase 83 U/L      Total Bilirubin 0.2 mg/dL      eGFR Non African Amer 94 mL/min/1.73      Globulin 3.4 gm/dL      A/G Ratio 1.2 g/dL      BUN/Creatinine Ratio 21.5     Anion Gap 9.5 mmol/L     Narrative:      GFR Normal >60  Chronic Kidney Disease <60  Kidney Failure <15      Lipase [[id number]]  (Normal) Collected: 01/21/22 1304    Specimen: Blood Updated: 01/21/22 1334     Lipase 23 U/L     BNP [[id number]]  (Normal) Collected: 01/21/22 1304    Specimen: Blood Updated: 01/21/22 1332     proBNP 165.7 pg/mL     Narrative:      Among patients with dyspnea, NT-proBNP is highly sensitive for the detection of acute congestive heart failure. In addition NT-proBNP of <300 pg/ml effectively rules out acute congestive heart failure with 99% negative predictive value.    Results may be falsely decreased if patient taking Biotin.      Magnesium [[id number]]  (Normal) Collected: 01/21/22 1304    Specimen: Blood Updated: 01/21/22 1334     Magnesium 1.9 mg/dL     CK Total & CKMB [[id number]]  (Normal) Collected: 01/21/22 1304    Specimen: Blood Updated: 01/21/22 1334     CKMB 1.43 ng/mL      Creatine Kinase 78 U/L     Narrative:      CKMB results may be falsely decreased if patient taking Biotin.    Blood Culture - Blood, Arm, Left [[id number]] Collected: 01/21/22 1304    Specimen: Blood from Arm, Left Updated: 01/21/22 1311    Blood Culture - Blood, Blood, Venous Line [[id number]] Collected: 01/21/22 1304    Specimen: Blood, Venous Line  Updated: 01/21/22 1311    Lactic Acid, Plasma [[id number]]  (Normal) Collected: 01/21/22 1304    Specimen: Blood Updated: 01/21/22 1334     Lactate 1.1 mmol/L     CBC Auto Differential [[id number]]  (Abnormal) Collected: 01/21/22 1415    Specimen: Blood Updated: 01/21/22 1431     WBC 4.32 10*3/mm3      RBC 4.55 10*6/mm3      Hemoglobin 11.7 g/dL      Hematocrit 37.4 %      MCV 82.2 fL      MCH 25.7 pg      MCHC 31.3 g/dL      RDW 16.5 %      RDW-SD 49.1 fl      MPV 10.6 fL      Platelets 126 10*3/mm3      Neutrophil % 59.0 %      Lymphocyte % 29.9 %      Monocyte % 10.2 %      Eosinophil % 0.2 %      Basophil % 0.2 %      Immature Grans % 0.5 %      Neutrophils, Absolute 2.55 10*3/mm3      Lymphocytes, Absolute 1.29 10*3/mm3      Monocytes, Absolute 0.44 10*3/mm3      Eosinophils, Absolute 0.01 10*3/mm3      Basophils, Absolute 0.01 10*3/mm3      Immature Grans, Absolute 0.02 10*3/mm3      nRBC 0.0 /100 WBC     POC Troponin I [[id number]]  (Normal) Collected: 01/21/22 1419    Specimen: Blood Updated: 01/21/22 1431     Troponin I 0.03 ng/mL      Comment: Serial Number: [id number]:  [id number]              Imaging:  XR Chest 1 View    Result Date: 1/21/2022  PROCEDURE: XR CHEST 1 VW  COMPARISON: [facility], PET, NM PET/CT SKULL BASE TO MID THIGH, 10/27/2021, 10:44.  [facility], CR, XR CHEST 1 VW, 9/06/2021, 17:05.  INDICATIONS: CHEST PAIN FOR 3 DAYS  FINDINGS:  Left-sided port catheter noted with tip at the cavoatrial junction.  Status post aortic valve replacement.  Heart size normal.  Lungs are hypoinflated.  Left basilar atelectasis similar to the prior study.  Calcified right basilar granuloma.        1. Hypoinflated lungs with left basilar airspace disease likely atelectasis. 2. Stable left port catheter.     AKIRA CARLOS MD       Electronically Signed and Approved By: AKIRA CARLOS MD on 1/21/2022 at 13:20               Procedures:  Procedures    Progress     EKG:    Rhythm: sinus  Rate:  107  Axis: normal  Intervals: LBBB  ST Segment: no elevation    EKG Comparison: unchanged    Interpreted by me                         Medical Decision Making:  MDM  Number of Diagnoses or Management Options  Cellulitis, unspecified cellulitis site  Diagnosis management comments: The patient´s CBC was reviewed and shows no abnormalities of critical concern. Of note, there is no anemia requiring a blood transfusion and the platelet count is acceptable.  The patient´s CMP was reviewed and shows no abnormalities of critical concern. Of note, the patient´s sodium and potassium are acceptable. The patient´s liver enzymes are unremarkable. The patient´s renal function (creatinine) is preserved. The patient has a normal anion gap.  Troponin is negative.  Chest x-ray shows left basilar airspace disease.  The patient has erythema and pain consistent with cellulitis. The area of erythema was demarcated in the ED. The patient has no fluctuance or induration consistent with abscess formation. The patient was started on antibiotics in the Emergency Department. The patient is resting comfortably and feels better, is alert and in no distress. On re-examination the patient does not appear toxic and has no meningeal signs, and there's no intractable vomiting or respiratory distress. Based on the history, exam, diagnostic testing and reassessment, the patient has no signs of sepsis.  The patient's vital signs have been stable. The patient's condition is stable and is appropriate for discharge. The patient will pursue further outpatient evaluation with the primary care physician or other designated or consultant physician as indicated in the discharge instructions. The patient was counseled that a repeat examination within two days is imperative. This can be done as an outpatient. Patient advised to return to the ED if outpatient evaluation is not feasible. The patient was counseled to return to the ER sooner for worsening of erythema  or spread outside of the demarcated lines. The patient was also counseled to return for worsening fever, chills, altered mental status, intractable vomiting or any other symptoms of concern.        Amount and/or Complexity of Data Reviewed  Clinical lab tests: reviewed  Tests in the radiology section of CPT®: reviewed  Tests in the medicine section of CPT®: reviewed  Independent visualization of images, tracings, or specimens: yes    Risk of Complications, Morbidity, and/or Mortality  Presenting problems: moderate  Management options: moderate    Patient Progress  Patient progress: stable       Final diagnoses:   Cellulitis, unspecified cellulitis site        Disposition:  ED Disposition     ED Disposition Condition Comment    Discharge Stable              Makenzie Xie MD  01/21/22 [id number]       Makenzie Xie MD  02/16/22 [id number]

## 2022-01-23 LAB — SARS-COV-2 RNA PNL SPEC NAA+PROBE: DETECTED

## 2022-01-24 ENCOUNTER — TELEPHONE (OUTPATIENT)
Dept: FAMILY MEDICINE CLINIC | Facility: CLINIC | Age: 57
End: 2022-01-24

## 2022-01-24 NOTE — TELEPHONE ENCOUNTER
PT IS REQUESTING A TELE HEALTH VISIT WITH IRAIS HOPKINS. PROVIDER IS EVARISTO MATTHEWS BUT HE DOESN'T HAVE ANYTHING TOMORROW. SHE TESTED POSITIVE FOR COVID 2 DAYS AGO AND SEEMS LIKE BREATHING IS GETTING WORSE. SHE DIDN'T WANT TO GO TO ER AND THEN WE WERE DISCONNECTED. WILL NEED MA TO CALLED HER BACK AND ADVISE. [phone]

## 2022-01-25 ENCOUNTER — TELEPHONE (OUTPATIENT)
Dept: FAMILY MEDICINE CLINIC | Facility: CLINIC | Age: 57
End: 2022-01-25

## 2022-01-25 NOTE — TELEPHONE ENCOUNTER
Patient had a telehealth visit scheduled with Sobeida Patterson at 12:30 pm on 01/25/2025. We tried her phone as well as her emergency contacts and was unable to get ahold of anyone. None of the phone numbers are in service.

## 2022-01-25 NOTE — TELEPHONE ENCOUNTER
Unable to make contact with pt to really that we will not be able to change provider for her. Her phone number is not accepting call at the moment

## 2022-01-26 LAB
BACTERIA SPEC AEROBE CULT: NORMAL
BACTERIA SPEC AEROBE CULT: NORMAL

## 2022-01-27 ENCOUNTER — TELEPHONE (OUTPATIENT)
Dept: FAMILY MEDICINE CLINIC | Facility: CLINIC | Age: 57
End: 2022-01-27

## 2022-01-27 NOTE — TELEPHONE ENCOUNTER
Hub staff attempted to follow warm transfer process and was unsuccessful     Caller: Isha Llanes    Relationship to patient: Self    Best call back number: [phone] OR  [phone]    Patient is needing: THE PATIENT IS RETURNING A MISSED CALL. SHE WOULD LIKE A CALL BACK ASAP TO DISCUSS AN APPOINTMENT FOR HER COVID POSITIVE, AND YEAST, ULCERS ON HER LEGS, AND HER DIABETES

## 2022-01-28 ENCOUNTER — TELEPHONE (OUTPATIENT)
Dept: FAMILY MEDICINE CLINIC | Facility: CLINIC | Age: 57
End: 2022-01-28

## 2022-01-28 NOTE — TELEPHONE ENCOUNTER
Made contact with pt. Informed that we will not be changing her pcp at this time and that her visit had to be in person per provider. Pt verbalized understanding

## 2022-01-28 NOTE — TELEPHONE ENCOUNTER
TESTED POSITIVE FOR COVID. NEEDS SOMETHING FOR COUGH. PLEASE CALL [phone]. ER VISIT ON 01/21/2022. STEROIDS-BUT NOTHING FOR COUGH. CALL HER OR HER DAUGHTERS.

## 2022-01-31 ENCOUNTER — TELEPHONE (OUTPATIENT)
Dept: GASTROENTEROLOGY | Facility: CLINIC | Age: 57
End: 2022-01-31

## 2022-01-31 RX ORDER — BENZONATATE 200 MG/1
200 CAPSULE ORAL 3 TIMES DAILY PRN
Qty: 30 CAPSULE | Refills: 1 | Status: SHIPPED | OUTPATIENT
Start: 2022-01-31 | End: 2022-03-25

## 2022-01-31 NOTE — TELEPHONE ENCOUNTER
Patient states our office called in regards to a procedure. Patient notified of procedure on 04/13/2022 at 10:30.

## 2022-02-03 RX ORDER — ONDANSETRON 4 MG/1
4 TABLET, FILM COATED ORAL EVERY 8 HOURS PRN
Qty: 15 TABLET | Refills: 0 | Status: SHIPPED | OUTPATIENT
Start: 2022-02-03 | End: 2022-04-12

## 2022-02-03 RX ORDER — DEXTROMETHORPHAN POLISTIREX 30 MG/5ML
60 SUSPENSION ORAL EVERY 12 HOURS SCHEDULED
Qty: 280 ML | Refills: 1 | Status: SHIPPED | OUTPATIENT
Start: 2022-02-03 | End: 2022-02-14

## 2022-02-11 ENCOUNTER — HOSPITAL ENCOUNTER (EMERGENCY)
Facility: HOSPITAL | Age: 57
Discharge: HOME | End: 2022-02-12
Attending: EMERGENCY MEDICINE | Admitting: EMERGENCY MEDICINE

## 2022-02-11 ENCOUNTER — TELEPHONE (OUTPATIENT)
Dept: INTERNAL MEDICINE | Facility: CLINIC | Age: 57
End: 2022-02-11

## 2022-02-11 ENCOUNTER — OFFICE VISIT (OUTPATIENT)
Dept: WOUND CARE | Facility: HOSPITAL | Age: 57
End: 2022-02-11

## 2022-02-11 VITALS
DIASTOLIC BLOOD PRESSURE: 76 MMHG | HEIGHT: 67 IN | BODY MASS INDEX: 25.11 KG/M2 | SYSTOLIC BLOOD PRESSURE: 124 MMHG | HEART RATE: 102 BPM | RESPIRATION RATE: 18 BRPM | TEMPERATURE: 96.8 F | WEIGHT: 160 LBS

## 2022-02-11 VITALS
HEIGHT: 66 IN | DIASTOLIC BLOOD PRESSURE: 66 MMHG | BODY MASS INDEX: 26.84 KG/M2 | WEIGHT: 167 LBS | HEART RATE: 99 BPM | OXYGEN SATURATION: 94 % | RESPIRATION RATE: 20 BRPM | SYSTOLIC BLOOD PRESSURE: 123 MMHG | TEMPERATURE: 98 F

## 2022-02-11 DIAGNOSIS — I83.019 VENOUS STASIS ULCERS OF BOTH LOWER EXTREMITIES: ICD-10-CM

## 2022-02-11 DIAGNOSIS — L97.929 VENOUS STASIS ULCERS OF BOTH LOWER EXTREMITIES: ICD-10-CM

## 2022-02-11 DIAGNOSIS — L97.221 VENOUS STASIS ULCER OF LEFT CALF LIMITED TO BREAKDOWN OF SKIN, UNSPECIFIED WHETHER VARICOSE VEINS PRESENT: ICD-10-CM

## 2022-02-11 DIAGNOSIS — I83.029 VENOUS STASIS ULCERS OF BOTH LOWER EXTREMITIES: ICD-10-CM

## 2022-02-11 DIAGNOSIS — I83.012 VENOUS STASIS ULCER OF RIGHT CALF LIMITED TO BREAKDOWN OF SKIN, UNSPECIFIED WHETHER VARICOSE VEINS PRESENT: ICD-10-CM

## 2022-02-11 DIAGNOSIS — I87.8 VENOUS STASIS OF BOTH LOWER EXTREMITIES: Primary | ICD-10-CM

## 2022-02-11 DIAGNOSIS — I83.022 VENOUS STASIS ULCER OF LEFT CALF LIMITED TO BREAKDOWN OF SKIN, UNSPECIFIED WHETHER VARICOSE VEINS PRESENT: ICD-10-CM

## 2022-02-11 DIAGNOSIS — N30.00 ACUTE CYSTITIS WITHOUT HEMATURIA: Primary | ICD-10-CM

## 2022-02-11 DIAGNOSIS — L97.919 VENOUS STASIS ULCERS OF BOTH LOWER EXTREMITIES: ICD-10-CM

## 2022-02-11 DIAGNOSIS — L97.211 VENOUS STASIS ULCER OF RIGHT CALF LIMITED TO BREAKDOWN OF SKIN, UNSPECIFIED WHETHER VARICOSE VEINS PRESENT: ICD-10-CM

## 2022-02-11 LAB
ALBUMIN SERPL-MCNC: 4.3 G/DL (ref 3.5–5.2)
ALBUMIN/GLOB SERPL: 1.2 G/DL
ALP SERPL-CCNC: 95 U/L (ref 39–117)
ALT SERPL W P-5'-P-CCNC: 11 U/L (ref 1–33)
ANION GAP SERPL CALCULATED.3IONS-SCNC: 9 MMOL/L (ref 5–15)
APTT PPP: 22.6 SECONDS (ref 22.2–34.2)
AST SERPL-CCNC: 13 U/L (ref 1–32)
BACTERIA UR QL AUTO: ABNORMAL /HPF
BASOPHILS # BLD AUTO: 0.01 10*3/MM3 (ref 0–0.2)
BASOPHILS NFR BLD AUTO: 0.3 % (ref 0–1.5)
BILIRUB SERPL-MCNC: 0.2 MG/DL (ref 0–1.2)
BILIRUB UR QL STRIP: NEGATIVE
BUN SERPL-MCNC: 10 MG/DL (ref 6–20)
BUN/CREAT SERPL: 18.5 (ref 7–25)
CALCIUM SPEC-SCNC: 9.9 MG/DL (ref 8.6–10.5)
CHLORIDE SERPL-SCNC: 97 MMOL/L (ref 98–107)
CLARITY UR: ABNORMAL
CO2 SERPL-SCNC: 33 MMOL/L (ref 22–29)
COLOR UR: YELLOW
CREAT SERPL-MCNC: 0.54 MG/DL (ref 0.57–1)
D-LACTATE SERPL-SCNC: 1.5 MMOL/L (ref 0.5–2)
DEPRECATED RDW RBC AUTO: 51 FL (ref 37–54)
EOSINOPHIL # BLD AUTO: 0.06 10*3/MM3 (ref 0–0.4)
EOSINOPHIL NFR BLD AUTO: 1.7 % (ref 0.3–6.2)
ERYTHROCYTE [DISTWIDTH] IN BLOOD BY AUTOMATED COUNT: 16.7 % (ref 12.3–15.4)
GFR SERPL CREATININE-BSD FRML MDRD: 117 ML/MIN/1.73
GLOBULIN UR ELPH-MCNC: 3.7 GM/DL
GLUCOSE SERPL-MCNC: 166 MG/DL (ref 65–99)
GLUCOSE UR STRIP-MCNC: ABNORMAL MG/DL
HCT VFR BLD AUTO: 42.3 % (ref 34–46.6)
HGB BLD-MCNC: 13.1 G/DL (ref 12–15.9)
HGB UR QL STRIP.AUTO: ABNORMAL
HOLD SPECIMEN: NORMAL
HOLD SPECIMEN: NORMAL
HYALINE CASTS UR QL AUTO: ABNORMAL /LPF
IMM GRANULOCYTES # BLD AUTO: 0.01 10*3/MM3 (ref 0–0.05)
IMM GRANULOCYTES NFR BLD AUTO: 0.3 % (ref 0–0.5)
INR PPP: 0.94 (ref 2–3)
KETONES UR QL STRIP: NEGATIVE
LEUKOCYTE ESTERASE UR QL STRIP.AUTO: ABNORMAL
LYMPHOCYTES # BLD AUTO: 0.79 10*3/MM3 (ref 0.7–3.1)
LYMPHOCYTES NFR BLD AUTO: 22.1 % (ref 19.6–45.3)
MCH RBC QN AUTO: 26 PG (ref 26.6–33)
MCHC RBC AUTO-ENTMCNC: 31 G/DL (ref 31.5–35.7)
MCV RBC AUTO: 84.1 FL (ref 79–97)
MONOCYTES # BLD AUTO: 0.41 10*3/MM3 (ref 0.1–0.9)
MONOCYTES NFR BLD AUTO: 11.5 % (ref 5–12)
NEUTROPHILS NFR BLD AUTO: 2.29 10*3/MM3 (ref 1.7–7)
NEUTROPHILS NFR BLD AUTO: 64.1 % (ref 42.7–76)
NITRITE UR QL STRIP: NEGATIVE
NRBC BLD AUTO-RTO: 0 /100 WBC (ref 0–0.2)
PH UR STRIP.AUTO: 7.5 [PH] (ref 5–8)
PLATELET # BLD AUTO: 149 10*3/MM3 (ref 140–450)
PMV BLD AUTO: 9.3 FL (ref 6–12)
POTASSIUM SERPL-SCNC: 4.7 MMOL/L (ref 3.5–5.2)
PROT SERPL-MCNC: 8 G/DL (ref 6–8.5)
PROT UR QL STRIP: NEGATIVE
PROTHROMBIN TIME: 10 SECONDS (ref 9.4–12)
RBC # BLD AUTO: 5.03 10*6/MM3 (ref 3.77–5.28)
RBC # UR STRIP: ABNORMAL /HPF
REF LAB TEST METHOD: ABNORMAL
SODIUM SERPL-SCNC: 139 MMOL/L (ref 136–145)
SP GR UR STRIP: 1.02 (ref 1–1.03)
SQUAMOUS #/AREA URNS HPF: ABNORMAL /HPF
UROBILINOGEN UR QL STRIP: ABNORMAL
WBC # UR STRIP: ABNORMAL /HPF
WBC NRBC COR # BLD: 3.57 10*3/MM3 (ref 3.4–10.8)
WHOLE BLOOD HOLD SPECIMEN: NORMAL
WHOLE BLOOD HOLD SPECIMEN: NORMAL

## 2022-02-11 PROCEDURE — 85025 COMPLETE CBC W/AUTO DIFF WBC: CPT | Performed by: EMERGENCY MEDICINE

## 2022-02-11 PROCEDURE — 85730 THROMBOPLASTIN TIME PARTIAL: CPT | Performed by: EMERGENCY MEDICINE

## 2022-02-11 PROCEDURE — 25010000002 LINEZOLID 600 MG/300ML SOLUTION: Performed by: EMERGENCY MEDICINE

## 2022-02-11 PROCEDURE — 83605 ASSAY OF LACTIC ACID: CPT | Performed by: EMERGENCY MEDICINE

## 2022-02-11 PROCEDURE — 25010000002 MEROPENEM PER 100 MG: Performed by: EMERGENCY MEDICINE

## 2022-02-11 PROCEDURE — 99283 EMERGENCY DEPT VISIT LOW MDM: CPT

## 2022-02-11 PROCEDURE — 81001 URINALYSIS AUTO W/SCOPE: CPT

## 2022-02-11 PROCEDURE — 96367 TX/PROPH/DG ADDL SEQ IV INF: CPT

## 2022-02-11 PROCEDURE — 96365 THER/PROPH/DIAG IV INF INIT: CPT

## 2022-02-11 PROCEDURE — 80053 COMPREHEN METABOLIC PANEL: CPT | Performed by: EMERGENCY MEDICINE

## 2022-02-11 PROCEDURE — 87040 BLOOD CULTURE FOR BACTERIA: CPT | Performed by: EMERGENCY MEDICINE

## 2022-02-11 PROCEDURE — 85610 PROTHROMBIN TIME: CPT | Performed by: EMERGENCY MEDICINE

## 2022-02-11 PROCEDURE — 99214 OFFICE O/P EST MOD 30 MIN: CPT | Performed by: SURGERY

## 2022-02-11 PROCEDURE — 36415 COLL VENOUS BLD VENIPUNCTURE: CPT

## 2022-02-11 PROCEDURE — G0463 HOSPITAL OUTPT CLINIC VISIT: HCPCS | Performed by: SURGERY

## 2022-02-11 RX ORDER — LINEZOLID 2 MG/ML
600 INJECTION, SOLUTION INTRAVENOUS ONCE
Status: COMPLETED | OUTPATIENT
Start: 2022-02-11 | End: 2022-02-12

## 2022-02-11 RX ORDER — SODIUM CHLORIDE 0.9 % (FLUSH) 0.9 %
10 SYRINGE (ML) INJECTION AS NEEDED
Status: DISCONTINUED | OUTPATIENT
Start: 2022-02-11 | End: 2022-02-12 | Stop reason: HOSPADM

## 2022-02-11 RX ADMIN — LINEZOLID 600 MG: 600 INJECTION, SOLUTION INTRAVENOUS at 22:26

## 2022-02-11 RX ADMIN — SODIUM CHLORIDE 500 ML: 9 INJECTION, SOLUTION INTRAVENOUS at 21:46

## 2022-02-11 RX ADMIN — Medication 1 G: at 21:46

## 2022-02-11 NOTE — PROGRESS NOTES
"Chief Complaint  Wound Check (BLE ULCERS)    Subjective            Objective     Vitals:    02/11/22 1533   BP: 124/76   BP Location: Left arm   Patient Position: Sitting   Pulse: 102   Resp: 18   Temp: 96.8 °F (36 °C)   TempSrc: Temporal   Weight: 72.6 kg (160 lb)   Height: 170.2 cm (67\")   PainSc:   8         I have reviewed the HPI and ROS as documented by MA/RN. Sachin Peñaloza MD    Physical Exam     Wound Description:  The patient today has increased edema in both lower extremities with significant redness and pain in both of her lower extremities more so on the on the right than the left.  She has multiple superficial ulcers on both legs but these were very small ulcers and I do  think that I personally feel this patient needs IV antibiotics and needs to be evaluated further so I have asked her to go to the emergency room at our hospital for that.  I talked with Dr. Gould who is on-call hospitalist today and he states that he will be sure she is seen there.  At the present time there are no evidence of abscess.    Result Review :   The following data was reviewed by: Sachin Peñaloza MD on 02/11/2022:               Assessment and Plan    Diagnoses and all orders for this visit:    1. Venous stasis of both lower extremities (Primary)    2. Venous stasis ulcer of left calf limited to breakdown of skin, unspecified whether varicose veins present (HCC)    3. Venous stasis ulcer of right calf limited to breakdown of skin, unspecified whether varicose veins present (HCC)            Follow Up   Return in about 1 week (around 2/18/2022).  Patient was given instructions and counseling regarding her condition or for health maintenance advice. Please see specific information pulled into the AVS if appropriate.       "

## 2022-02-11 NOTE — SIGNIFICANT NOTE
Epithelial %: 25-50%    Exposed Bone: no    Exposed Tendon: no    Impression: unchanged    Short Term Goals: INCREASE GRANULATION, DECREASE SIZE

## 2022-02-11 NOTE — SIGNIFICANT NOTE
Epithelial %: 25-50%    Exposed Bone: no    Exposed Tendon: no    Impression: WORSENING     Short Term Goals: INCREASE GRANULATION, DECREASE SIZE

## 2022-02-11 NOTE — TELEPHONE ENCOUNTER
Steph called our office regarding a home health order that was sent back by Dr. Newman that he did not sign due to the patient not being under his care in May 2021.   Steph states they talked to Lyubov, who was a float helping out in the office at that time and they got a verbal from her. I explained to them that Lyubov was a float and in that position she was filling in just to help the office and isn't stationed in this office.   I told Steph that the patient was under Damari Cornejo's care at the time and they need to contact that office and see if that provider will sign the order.

## 2022-02-11 NOTE — SIGNIFICANT NOTE
Epithelial %: 1-25%    Exposed Bone: no    Exposed Tendon: no    Impression: WORSENING    Short Term Goals: INCREASE GRANULATION, DECREASE SIZE

## 2022-02-12 PROCEDURE — 25010000002 HEPARIN LOCK FLUSH PER 10 UNITS: Performed by: EMERGENCY MEDICINE

## 2022-02-12 PROCEDURE — 96375 TX/PRO/DX INJ NEW DRUG ADDON: CPT

## 2022-02-12 RX ORDER — HEPARIN SODIUM (PORCINE) LOCK FLUSH IV SOLN 100 UNIT/ML 100 UNIT/ML
500 SOLUTION INTRAVENOUS ONCE
Status: COMPLETED | OUTPATIENT
Start: 2022-02-12 | End: 2022-02-12

## 2022-02-12 RX ADMIN — HEPARIN SODIUM (PORCINE) LOCK FLUSH IV SOLN 100 UNIT/ML 500 UNITS: 100 SOLUTION at 01:42

## 2022-02-12 NOTE — DISCHARGE INSTRUCTIONS
Follow-up with your wound care physician first thing Monday morning to determine how long he should stay on IV antibiotics and at what point you can transition to oral antibiotics.  Return to the emergency department for fever or worsening of your wounds despite these antibiotics.  Stay well-hydrated.

## 2022-02-12 NOTE — ED PROVIDER NOTES
Time: 8:26 PM EST  Arrived by: private car  Chief Complaint: Bilateral lower extremity diabetic ulcers  History provided by: Patient  History is limited by: Very poor historian      History of Present Illness:  Patient is a 56 y.o. year old female who presents to the emergency department with chronic lower extremity venous stasis ulcers that have been worsening in the past week.  She saw her wound care physician today as an outpatient and was advised to come to the emergency department for IV antibiotics.  She states in the past week these wounds have become much more red and inflamed with multiple areas that are newly opened up.  Complains of nausea but denies fever, vomiting or any other symptoms.  Patient has recently been on outpatient oral Levaquin with continued worsening of wounds despite this.    HPI    Similar Symptoms Previously: Yes  Recently seen: Yes      Patient Care Team  Primary Care Provider: Vidal Luevano APRN    Past Medical History:     Allergies   Allergen Reactions   • Amoxicillin Shortness Of Breath   • Ceclor [Cefaclor] Shortness Of Breath   • Penicillins Shortness Of Breath   • Sulfa Antibiotics Rash   • Valium [Diazepam] Mental Status Change   • Ambien [Zolpidem] Unknown - Low Severity   • Aspirin GI Intolerance   • Ativan [Lorazepam] Unknown - Low Severity   • Benadryl [Diphenhydramine] Unknown - Low Severity   • Biaxin [Clarithromycin] Unknown - Low Severity   • Cefazolin Unknown - Low Severity   • Cephalexin Unknown - Low Severity   • Cephalosporins Unknown - Low Severity   • Clindamycin Unknown - Low Severity   • Compazine [Prochlorperazine Edisylate] Rash   • Contrast Dye Other (See Comments)     Caused pain in her arm   • Doxycycline GI Intolerance   • Eggs Or Egg-Derived Products Unknown - Low Severity   • Nsaids Unknown - Low Severity   • Phenergan [Promethazine Hcl] Rash   • Promethazine Unknown - Low Severity   • Vancomycin Itching     Past Medical History:   Diagnosis Date   •  Anxiety     NO CURRENT MEDICATION   • Aortic stenosis, severe    • Arthritis    • Asthma    • Back pain    • Blood clotting disorder (HCC)    • Cancer associated pain    • Chronic low back pain with sciatica    • Diabetes mellitus (HCC)     TYPE 2   • Dyspnea on effort    • GERD (gastroesophageal reflux disease)    • H/O lumpectomy    • H/O: hysterectomy    • Hiatal hernia    • History of degenerative disc disease    • History of kidney stones    • History of pulmonary embolus (PE)    • History of transfusion    • Hodgkin's lymphoma (HCC)    • Immobility    • Lupus (HCC)    • Mitral valve prolapse    • Pelvic pain    • Peripheral neuropathy    • Personal history of DVT (deep vein thrombosis)    • Presence of intrathecal pump    • Sacroiliac joint disease    • SI (sacroiliac) joint inflammation (HCC)    • Venous insufficiency      Past Surgical History:   Procedure Laterality Date   • BACK SURGERY      SPINAL FUSION    • BLADDER REPAIR     • CARDIAC CATHETERIZATION N/A 3/6/2019    Procedure: Valvuloplasty;  Surgeon: Wayne Johnson MD;  Location: [facility] INVASIVE LOCATION;  Service: Cardiology   • CARDIAC VALVE REPLACEMENT  2021   • CHOLECYSTECTOMY     • COLONOSCOPY N/A 12/29/2021    Procedure: COLONOSCOPY;  Surgeon: Karine Orlando MD;  Location: [facility] ENDOSCOPY;  Service: Gastroenterology;  Laterality: N/A;  POOR PREP   • ENDOSCOPY N/A 12/29/2021    Procedure: ESOPHAGOGASTRODUODENOSCOPY;  Surgeon: Karine Orlando MD;  Location: [facility] ENDOSCOPY;  Service: Gastroenterology;  Laterality: N/A;  ESOPHAGITIS, GASTRITIS, HIATAL HERNIA   • HYSTERECTOMY     • LYMPHADENECTOMY     • PACEMAKER IMPLANTATION     • PAIN PUMP INSERTION/REVISION N/A 10/18/2019    Procedure: PAIN PUMP INSERTION [facility] 10-18-19 [place];  Surgeon: Horace Rios MD;  Location: [facility] OR;  Service: Pain Management   • PAIN PUMP INSERTION/REVISION N/A 11/23/2020    Procedure: pain pump removal;  Surgeon: Gabriel  Horace KRAMER MD;  Location: [facility] MAIN OR;  Service: Pain Management;  Laterality: N/A;   • TONSILLECTOMY     • TUBAL ABDOMINAL LIGATION     • TUMOR REMOVAL       Family History   Problem Relation Age of Onset   • Pancreatic cancer Sister    • Pancreatic cancer Paternal Grandmother    • Malig Hyperthermia Neg Hx        Home Medications:  Prior to Admission medications    Medication Sig Start Date End Date Taking? Authorizing Provider   acetaminophen (TYLENOL) 500 MG tablet Take 1,000-1,500 mg by mouth Every 6 (Six) Hours As Needed for Mild Pain .    ProviderNette MD   albuterol sulfate  (90 Base) MCG/ACT inhaler Inhale 2 puffs Every 4 (Four) Hours As Needed for Wheezing. 1/13/22   Vidal Luevaon APRN B-D UF III MINI PEN NEEDLES 31G X 5 MM misc  9/25/21   Provider, MD Nette   bacitracin 500 UNIT/GM ointment Apply 1 application topically to the appropriate area as directed 2 (Two) Times a Day. 12/23/21   Chelo Villeda MD   benzonatate (TESSALON) 200 MG capsule Take 1 capsule by mouth 3 (Three) Times a Day As Needed for Cough. 1/31/22   Vidal Luevano APRN   clopidogrel (Plavix) 75 MG tablet Take 1 tablet by mouth Daily. 9/29/21   Damari Cornejo PA-C   dextromethorphan polistirex ER (Delsym) 30 MG/5ML Suspension Extended Release oral suspension Take 10 mL by mouth Every 12 (Twelve) Hours. 2/3/22   Vidal Luevano APRN   doxycycline (DORYX) 100 MG enteric coated tablet Take 1 tablet by mouth 2 (Two) Times a Day. 1/21/22   Makenzie Xie MD   Elastic Bandages & Supports (ACE Bandage Self-Adhering) misc 1 each Daily. 8/1/21   Damari Cornejo PA-C   famotidine (PEPCID) 20 MG tablet Take 1 tablet by mouth At Night As Needed for Heartburn. 1/10/22   Sobeida Espinosa APRN   Flovent  MCG/ACT inhaler inhale 2 puffs by mouth twice daily  Patient taking differently: Inhale 1 puff 2 (Two) Times a Day. 7/27/21   Damari Cornejo PA-C   furosemide (LASIX) 40 MG tablet Take 1 tablet  by mouth Daily. 8/26/21   Damari Cornejo PA-C   gabapentin (NEURONTIN) 300 MG capsule TAKE ONE CAPSULE BY MOUTH THREE TIMES DAILY  Patient taking differently: Take 300 mg by mouth 3 (Three) Times a Day. 2/22/21   Horace Rios MD   HYDROmorphone (DILAUDID) 1 mg/mL solution Infuse  into a venous catheter Dose Adjusted By Provider As Needed. PER PAIN PUMP    Nette Jiménez MD   hydrOXYzine (ATARAX) 50 MG tablet Take 50 mg by mouth 4 (Four) Times a Day. 6/29/21   Nette Jiménez MD   Insulin Lispro (ADMELOG SOLOSTAR) 100 UNIT/ML injection pen Inject 12 Units under the skin into the appropriate area as directed Daily. SLIDING SCALE R/T BS. Takes 2-12 units tid 8/26/20   Nette Jiménez MD   ipratropium-albuterol (DUO-NEB) 0.5-2.5 mg/3 ml nebulizer Inhale 1 ampule. 4/7/21   Nette Jiménez MD   ondansetron (Zofran) 4 MG tablet Take 1 tablet by mouth Every 8 (Eight) Hours As Needed for Nausea or Vomiting. 2/3/22   Vidal Luevano APRN   pantoprazole (Protonix) 40 MG EC tablet Take 1 tablet by mouth Daily for 90 days. 1/10/22 4/10/22  Sobeida Espinosa APRN   predniSONE (DELTASONE) 50 MG tablet Take 1 tablet by mouth Daily. 1/21/22   Makenzie Xie MD   silver sulfadiazine (Silvadene) 1 % cream Apply 1 application topically to the appropriate area as directed Daily. 10/5/21   Chelo Villeda MD   silver sulfadiazine (Silvadene) 1 % cream Apply 1 application topically to the appropriate area as directed Daily. 11/11/21   Chelo Villeda MD   sodium chloride 0.9 % solution with HYDROmorphone (PF) 50 MG/5ML solution 0.2 mg/mL Infuse 0-2 mg into a venous catheter Every 1 (One) Hour As Needed for Pain.    ProviderNette MD   triamcinolone (KENALOG) 0.1 % ointment Apply  topically to the appropriate area as directed 2 (Two) Times a Day. For one week for venous stasis dermatitis. Do not apply to open wounds  Patient taking differently: Apply 1 application topically to the  "appropriate area as directed 2 (Two) Times a Day. For one week for venous stasis dermatitis. Do not apply to open wounds 8/31/21   Maged Schwab MD        Social History:   Social History     Tobacco Use   • Smoking status: Current Every Day Smoker     Packs/day: 2.00     Years: 41.00     Pack years: 82.00     Types: Cigarettes   • Smokeless tobacco: Never Used   Vaping Use   • Vaping Use: Never used   Substance Use Topics   • Alcohol use: No   • Drug use: No     Recent travel: no     Review of Systems:  Review of Systems   Constitutional: Negative for chills and fever.   HENT: Negative for congestion, rhinorrhea and sore throat.    Eyes: Negative for pain and visual disturbance.   Respiratory: Negative for apnea, cough, chest tightness and shortness of breath.    Cardiovascular: Negative for chest pain and palpitations.   Gastrointestinal: Positive for nausea. Negative for abdominal pain, diarrhea and vomiting.   Genitourinary: Negative for difficulty urinating and dysuria.   Musculoskeletal: Negative for joint swelling and myalgias.   Skin: Positive for rash and wound. Negative for color change.   Neurological: Negative for seizures and headaches.   Psychiatric/Behavioral: Negative.    All other systems reviewed and are negative.       Physical Exam:  /66   Pulse 99   Temp 98 °F (36.7 °C)   Resp 20   Ht 167.6 cm (66\")   Wt 75.8 kg (167 lb)   SpO2 94%   BMI 26.95 kg/m²     Physical Exam  Vitals and nursing note reviewed.   Constitutional:       Appearance: Normal appearance.      Comments: Has a great deal of difficulty staying awake for history   HENT:      Head: Normocephalic and atraumatic.      Nose: Nose normal.      Mouth/Throat:      Mouth: Mucous membranes are dry.   Eyes:      Extraocular Movements: Extraocular movements intact.      Pupils: Pupils are equal, round, and reactive to light.   Cardiovascular:      Rate and Rhythm: Regular rhythm. Tachycardia present.      Heart " sounds: Murmur heard.       Pulmonary:      Effort: Pulmonary effort is normal.      Breath sounds: Normal breath sounds.   Abdominal:      General: Bowel sounds are normal.      Palpations: Abdomen is soft.      Tenderness: There is no abdominal tenderness.   Musculoskeletal:         General: Swelling and tenderness present. Normal range of motion.      Cervical back: Normal range of motion and neck supple.      Right lower leg: Edema present.      Left lower leg: Edema present.      Comments: Moderate bilateral lower extremity cellulitis from the ankle to the proximal leg just below the knees.  Both legs have multiple open wounds with mild purulent drainage from both legs.   Skin:     General: Skin is warm.      Coloration: Skin is not jaundiced.      Findings: Erythema present.   Neurological:      General: No focal deficit present.      Mental Status: She is oriented to person, place, and time. Mental status is at baseline.   Psychiatric:         Mood and Affect: Mood normal.         Behavior: Behavior normal.         Judgment: Judgment normal.                Medications in the Emergency Department:  Medications   sodium chloride 0.9 % flush 10 mL (has no administration in time range)   sodium chloride 0.9 % bolus 500 mL (0 mL Intravenous Stopped 2/12/22 0132)   Linezolid (ZYVOX) 600 mg 300 mL (0 mg Intravenous Stopped 2/12/22 0118)   meropenem (MERREM) 1 g/100 mL 0.9% NS (mbp) (0 g Intravenous Stopped 2/11/22 2226)   heparin injection 500 Units (500 Units Intravenous Given 2/12/22 0142)        Labs  Lab Results (last 24 hours)     Procedure Component Value Units Date/Time    Urinalysis With Microscopic If Indicated (No Culture) - Urine, Clean Catch [[id number]]  (Abnormal) Collected: 02/11/22 1930    Specimen: Urine, Clean Catch Updated: 02/11/22 1943     Color, UA Yellow     Appearance, UA Turbid     pH, UA 7.5     Specific Gravity, UA 1.017     Glucose,  mg/dL (1+)     Ketones, UA Negative     Bilirubin,  UA Negative     Blood, UA Small (1+)     Protein, UA Negative     Leuk Esterase, UA Large (3+)     Nitrite, UA Negative     Urobilinogen, UA 0.2 E.U./dL    Urinalysis, Microscopic Only - Urine, Clean Catch [[id number]]  (Abnormal) Collected: 02/11/22 1930    Specimen: Urine, Clean Catch Updated: 02/11/22 1943     RBC, UA 6-12 /HPF      WBC, UA Too Numerous to Count /HPF      Bacteria, UA 1+ /HPF      Squamous Epithelial Cells, UA 0-2 /HPF      Hyaline Casts, UA 0-2 /LPF      Methodology Automated Microscopy    Blood Culture - Blood, Arm, Left [[id number]] Collected: 02/11/22 2055    Specimen: Blood from Arm, Left Updated: 02/11/22 2138    Protime-INR [[id number]]  (Abnormal) Collected: 02/11/22 2055    Specimen: Blood Updated: 02/11/22 2128     Protime 10.0 Seconds      INR 0.94    Narrative:      Suggested Therapeutic Ranges For Oral Anticoagulant Therapy:  Level of Therapy                      INR Target Range  Standard Dose                            2.0-3.0  High Dose                                2.5-3.5  Patients not receiving anticoagulant  Therapy Normal Range                     0.6-1.2    aPTT [[id number]]  (Normal) Collected: 02/11/22 2055    Specimen: Blood Updated: 02/11/22 2128     PTT 22.6 seconds     Lactic Acid, Plasma [[id number]]  (Normal) Collected: 02/11/22 2056    Specimen: Blood Updated: 02/11/22 2120     Lactate 1.5 mmol/L     CBC & Differential [[id number]]  (Abnormal) Collected: 02/11/22 2056    Specimen: Blood Updated: 02/11/22 2114    Narrative:      The following orders were created for panel order CBC & Differential.  Procedure                               Abnormality         Status                     ---------                               -----------         ------                     CBC Auto Differential[[id number]]        Abnormal            Final result                 Please view results for these tests on the individual orders.    Comprehensive Metabolic Panel [[id number]]   (Abnormal) Collected: 02/11/22 2056    Specimen: Blood Updated: 02/11/22 2124     Glucose 166 mg/dL      BUN 10 mg/dL      Creatinine 0.54 mg/dL      Sodium 139 mmol/L      Potassium 4.7 mmol/L      Chloride 97 mmol/L      CO2 33.0 mmol/L      Calcium 9.9 mg/dL      Total Protein 8.0 g/dL      Albumin 4.30 g/dL      ALT (SGPT) 11 U/L      AST (SGOT) 13 U/L      Alkaline Phosphatase 95 U/L      Total Bilirubin 0.2 mg/dL      eGFR Non African Amer 117 mL/min/1.73      Globulin 3.7 gm/dL      A/G Ratio 1.2 g/dL      BUN/Creatinine Ratio 18.5     Anion Gap 9.0 mmol/L     Narrative:      GFR Normal >60  Chronic Kidney Disease <60  Kidney Failure <15      CBC Auto Differential [[id number]]  (Abnormal) Collected: 02/11/22 2056    Specimen: Blood Updated: 02/11/22 2114     WBC 3.57 10*3/mm3      RBC 5.03 10*6/mm3      Hemoglobin 13.1 g/dL      Hematocrit 42.3 %      MCV 84.1 fL      MCH 26.0 pg      MCHC 31.0 g/dL      RDW 16.7 %      RDW-SD 51.0 fl      MPV 9.3 fL      Platelets 149 10*3/mm3      Neutrophil % 64.1 %      Lymphocyte % 22.1 %      Monocyte % 11.5 %      Eosinophil % 1.7 %      Basophil % 0.3 %      Immature Grans % 0.3 %      Neutrophils, Absolute 2.29 10*3/mm3      Lymphocytes, Absolute 0.79 10*3/mm3      Monocytes, Absolute 0.41 10*3/mm3      Eosinophils, Absolute 0.06 10*3/mm3      Basophils, Absolute 0.01 10*3/mm3      Immature Grans, Absolute 0.01 10*3/mm3      nRBC 0.0 /100 WBC     Blood Culture - Blood, Arm, Left [[id number]] Collected: 02/11/22 2104    Specimen: Blood from Arm, Left Updated: 02/11/22 2109           Imaging:  No Radiology Exams Resulted Within Past 24 Hours    Procedures:  Procedures    Progress  ED Course as of 02/12/22 0244   Sat Feb 12, 2022   0007 Case d/w Dr. Rodríguez to eval for admit/obs.  He request I see if we are able to get patient set up with outpatient IV antibiotics from the ED.  This is a very reasonable request and I will attempt this.  He states I can call back for  potential observation if unable to arrange this. [RP]      ED Course User Index  [RP] Yoel Ahmadi MD                            Medical Decision Making:  MDM  Number of Diagnoses or Management Options     Amount and/or Complexity of Data Reviewed  Clinical lab tests: reviewed    Risk of Complications, Morbidity, and/or Mortality  Presenting problems: moderate  Management options: low    Patient Progress  Patient progress: stable       Final diagnoses:   Acute cystitis without hematuria   Venous stasis ulcers of both lower extremities (HCC)        Disposition:  ED Disposition     ED Disposition Condition Comment    Discharge Stable           This medical record created using voice recognition software and may contain unintended errors.           Yoel Ahmadi MD  02/12/22 0245

## 2022-02-14 ENCOUNTER — OFFICE VISIT (OUTPATIENT)
Dept: WOUND CARE | Facility: HOSPITAL | Age: 57
End: 2022-02-14

## 2022-02-14 VITALS
BODY MASS INDEX: 26.84 KG/M2 | HEART RATE: 95 BPM | DIASTOLIC BLOOD PRESSURE: 72 MMHG | HEIGHT: 66 IN | SYSTOLIC BLOOD PRESSURE: 118 MMHG | WEIGHT: 167 LBS | TEMPERATURE: 96.8 F | RESPIRATION RATE: 18 BRPM

## 2022-02-14 DIAGNOSIS — I83.012 VENOUS STASIS ULCER OF RIGHT CALF LIMITED TO BREAKDOWN OF SKIN, UNSPECIFIED WHETHER VARICOSE VEINS PRESENT: ICD-10-CM

## 2022-02-14 DIAGNOSIS — I87.303 VENOUS HYPERTENSION OF BOTH LOWER EXTREMITIES: ICD-10-CM

## 2022-02-14 DIAGNOSIS — I87.8 VENOUS STASIS OF BOTH LOWER EXTREMITIES: ICD-10-CM

## 2022-02-14 DIAGNOSIS — I83.022 VENOUS STASIS ULCER OF LEFT CALF LIMITED TO BREAKDOWN OF SKIN, UNSPECIFIED WHETHER VARICOSE VEINS PRESENT: Primary | ICD-10-CM

## 2022-02-14 DIAGNOSIS — L97.221 VENOUS STASIS ULCER OF LEFT CALF LIMITED TO BREAKDOWN OF SKIN, UNSPECIFIED WHETHER VARICOSE VEINS PRESENT: Primary | ICD-10-CM

## 2022-02-14 DIAGNOSIS — L97.211 VENOUS STASIS ULCER OF RIGHT CALF LIMITED TO BREAKDOWN OF SKIN, UNSPECIFIED WHETHER VARICOSE VEINS PRESENT: ICD-10-CM

## 2022-02-14 DIAGNOSIS — L03.119 CELLULITIS OF LOWER EXTREMITY, UNSPECIFIED LATERALITY: ICD-10-CM

## 2022-02-14 DIAGNOSIS — Z91.199 NONCOMPLIANCE WITH TREATMENT PLAN: ICD-10-CM

## 2022-02-14 PROCEDURE — 99214 OFFICE O/P EST MOD 30 MIN: CPT | Performed by: EMERGENCY MEDICINE

## 2022-02-14 PROCEDURE — G0463 HOSPITAL OUTPT CLINIC VISIT: HCPCS | Performed by: EMERGENCY MEDICINE

## 2022-02-14 RX ORDER — ALPRAZOLAM 0.5 MG/1
TABLET ORAL
COMMUNITY
Start: 2022-02-07 | End: 2022-03-25

## 2022-02-14 RX ORDER — LINEZOLID 600 MG/1
600 TABLET, FILM COATED ORAL 2 TIMES DAILY
Qty: 10 TABLET | Refills: 0 | Status: SHIPPED | OUTPATIENT
Start: 2022-02-14 | End: 2022-03-25

## 2022-02-14 NOTE — PROGRESS NOTES
Chief Complaint  Wound Check (BLE ULCERS; F/U TO DISCUSS IV ANTIBIOTICS)    Subjective        Wound Check        Patient is a 55-year-old female with a history of venous stasis in BLE with recurrent ulcerations and cellulitis.  She has a history of bilateral venous stasis with recurrent ulcerations.  Unfortunately she is not compliant with various wound care modalities and does not always take her medications as prescribed and also is not compliant with recommendations to elevate her lower extremities.  She repeatedly and adamantly refuses Unna boots which would be the most helpful for her.  She says that she has tried them before and they are just way too itchy and she just cannot do it.  She is resistant to most suggestions.    Patient tries to prop her feet up on a small stool when she is sitting.  Unfortunately she does not have an actual recliner where she can put them up and she is unable to sleep in bed due to it making her feel like she is smothering.  As such her legs are always hanging down to some degree.    At her wound care visit on Friday it was felt that she had worsening cellulitis in spite of being on oral Levaquin.  She was sent to the ED for IV antibiotics.  They gave her meropenem and linezolid and wrote prescriptions for both of those with an order for the patient to get them at [facility].  However this was not set up and the patient also is not able to get home health due to her insurance.  She contacted the office today stating that she has not had any IV antibiotics since her single dose in the ED on Friday.  As such, we added her onto the schedule and I am seeing her now.      Allergies:  Amoxicillin, Ceclor [cefaclor], Penicillins, Sulfa antibiotics, Valium [diazepam], Ambien [zolpidem], Aspirin, Ativan [lorazepam], Benadryl [diphenhydramine], Biaxin [clarithromycin], Cefazolin, Cephalexin, Cephalosporins, Clindamycin, Compazine [prochlorperazine edisylate], Contrast dye, Doxycycline, Eggs or  egg-derived products, Nsaids, Phenergan [promethazine hcl], Promethazine, and Vancomycin      Current Outpatient Medications:   •  acetaminophen (TYLENOL) 500 MG tablet, Take 1,000-1,500 mg by mouth Every 6 (Six) Hours As Needed for Mild Pain ., Disp: , Rfl:   •  albuterol sulfate  (90 Base) MCG/ACT inhaler, Inhale 2 puffs Every 4 (Four) Hours As Needed for Wheezing., Disp: 18 g, Rfl: 2  •  ALPRAZolam (XANAX) 0.5 MG tablet, TAKE ONE-HALF TO ONE TABLET BY MOUTH EVERY DAY, Disp: , Rfl:   •  B-D UF III MINI PEN NEEDLES 31G X 5 MM misc, , Disp: , Rfl:   •  bacitracin 500 UNIT/GM ointment, Apply 1 application topically to the appropriate area as directed 2 (Two) Times a Day., Disp: 453.9 g, Rfl: 1  •  benzonatate (TESSALON) 200 MG capsule, Take 1 capsule by mouth 3 (Three) Times a Day As Needed for Cough., Disp: 30 capsule, Rfl: 1  •  clopidogrel (Plavix) 75 MG tablet, Take 1 tablet by mouth Daily., Disp: 30 tablet, Rfl: 0  •  doxycycline (DORYX) 100 MG enteric coated tablet, Take 1 tablet by mouth 2 (Two) Times a Day., Disp: 20 tablet, Rfl: 0  •  Elastic Bandages & Supports (ACE Bandage Self-Adhering) misc, 1 each Daily., Disp: 60 each, Rfl: 0  •  famotidine (PEPCID) 20 MG tablet, Take 1 tablet by mouth At Night As Needed for Heartburn., Disp: 90 tablet, Rfl: 1  •  Flovent  MCG/ACT inhaler, inhale 2 puffs by mouth twice daily (Patient taking differently: Inhale 1 puff 2 (Two) Times a Day.), Disp: 12 g, Rfl: 2  •  furosemide (LASIX) 40 MG tablet, Take 1 tablet by mouth Daily., Disp: 90 tablet, Rfl: 1  •  gabapentin (NEURONTIN) 300 MG capsule, TAKE ONE CAPSULE BY MOUTH THREE TIMES DAILY (Patient taking differently: Take 300 mg by mouth 3 (Three) Times a Day.), Disp: 270 capsule, Rfl: 1  •  HYDROmorphone (DILAUDID) 1 mg/mL solution, Infuse  into a venous catheter Dose Adjusted By Provider As Needed. PER PAIN PUMP, Disp: , Rfl:   •  hydrOXYzine (ATARAX) 50 MG tablet, Take 50 mg by mouth 4 (Four) Times a Day.,  Disp: , Rfl:   •  Insulin Lispro (ADMELOG SOLOSTAR) 100 UNIT/ML injection pen, Inject 12 Units under the skin into the appropriate area as directed Daily. SLIDING SCALE R/T BS. Takes 2-12 units tid, Disp: , Rfl:   •  ipratropium-albuterol (DUO-NEB) 0.5-2.5 mg/3 ml nebulizer, Inhale 1 ampule., Disp: , Rfl:   •  linezolid (Zyvox) 600 MG tablet, Take 1 tablet by mouth 2 (Two) Times a Day., Disp: 10 tablet, Rfl: 0  •  meropenem (MERREM) 1 g/100 mL 0.9% NS (mbp), Infuse 100 mL into a venous catheter Every 8 (Eight) Hours for 5 days., Disp: 1500 mL, Rfl: 0  •  ondansetron (Zofran) 4 MG tablet, Take 1 tablet by mouth Every 8 (Eight) Hours As Needed for Nausea or Vomiting., Disp: 15 tablet, Rfl: 0  •  pantoprazole (Protonix) 40 MG EC tablet, Take 1 tablet by mouth Daily for 90 days., Disp: 90 tablet, Rfl: 1  •  predniSONE (DELTASONE) 50 MG tablet, Take 1 tablet by mouth Daily., Disp: 5 tablet, Rfl: 0  •  silver sulfadiazine (Silvadene) 1 % cream, Apply 1 application topically to the appropriate area as directed Daily., Disp: 400 g, Rfl: 3  •  silver sulfadiazine (Silvadene) 1 % cream, Apply 1 application topically to the appropriate area as directed Daily., Disp: 400 g, Rfl: 3  •  sodium chloride 0.9 % solution with HYDROmorphone (PF) 50 MG/5ML solution 0.2 mg/mL, Infuse 0-2 mg into a venous catheter Every 1 (One) Hour As Needed for Pain., Disp: , Rfl:   •  triamcinolone (KENALOG) 0.1 % ointment, Apply  topically to the appropriate area as directed 2 (Two) Times a Day. For one week for venous stasis dermatitis. Do not apply to open wounds (Patient taking differently: Apply 1 application topically to the appropriate area as directed 2 (Two) Times a Day. For one week for venous stasis dermatitis. Do not apply to open wounds), Disp: 15 g, Rfl: 0    Current Facility-Administered Medications:   •  sterile water irrigation solution 500 mL, 500 mL, Irrigation, Once, Damari Cornejo PA-C    Past Medical History:   Diagnosis Date    • Anxiety     NO CURRENT MEDICATION   • Aortic stenosis, severe    • Arthritis    • Asthma    • Back pain    • Blood clotting disorder (HCC)    • Cancer associated pain    • Chronic low back pain with sciatica    • Diabetes mellitus (HCC)     TYPE 2   • Dyspnea on effort    • GERD (gastroesophageal reflux disease)    • H/O lumpectomy    • H/O: hysterectomy    • Hiatal hernia    • History of degenerative disc disease    • History of kidney stones    • History of pulmonary embolus (PE)    • History of transfusion    • Hodgkin's lymphoma (HCC)    • Immobility    • Lupus (HCC)    • Mitral valve prolapse    • Pelvic pain    • Peripheral neuropathy    • Personal history of DVT (deep vein thrombosis)    • Presence of intrathecal pump    • Sacroiliac joint disease    • SI (sacroiliac) joint inflammation (HCC)    • Venous insufficiency      Past Surgical History:   Procedure Laterality Date   • BACK SURGERY      SPINAL FUSION    • BLADDER REPAIR     • CARDIAC CATHETERIZATION N/A 3/6/2019    Procedure: Valvuloplasty;  Surgeon: Wayne Johnson MD;  Location: [facility] INVASIVE LOCATION;  Service: Cardiology   • CARDIAC VALVE REPLACEMENT  2021   • CHOLECYSTECTOMY     • COLONOSCOPY N/A 12/29/2021    Procedure: COLONOSCOPY;  Surgeon: Karine Orlando MD;  Location: [facility] ENDOSCOPY;  Service: Gastroenterology;  Laterality: N/A;  POOR PREP   • ENDOSCOPY N/A 12/29/2021    Procedure: ESOPHAGOGASTRODUODENOSCOPY;  Surgeon: Karine Orlando MD;  Location: [facility] ENDOSCOPY;  Service: Gastroenterology;  Laterality: N/A;  ESOPHAGITIS, GASTRITIS, HIATAL HERNIA   • HYSTERECTOMY     • LYMPHADENECTOMY     • PACEMAKER IMPLANTATION     • PAIN PUMP INSERTION/REVISION N/A 10/18/2019    Procedure: PAIN PUMP INSERTION [facility] 10-18-19 [place];  Surgeon: Horace Rios MD;  Location: [facility] OR;  Service: Pain Management   • PAIN PUMP INSERTION/REVISION N/A 11/23/2020    Procedure: pain pump removal;  Surgeon:  "Horace Rios MD;  Location: [facility] MAIN OR;  Service: Pain Management;  Laterality: N/A;   • TONSILLECTOMY     • TUBAL ABDOMINAL LIGATION     • TUMOR REMOVAL       Social History     Socioeconomic History   • Marital status:    Tobacco Use   • Smoking status: Current Every Day Smoker     Packs/day: 2.00     Years: 41.00     Pack years: 82.00     Types: Cigarettes   • Smokeless tobacco: Never Used   Vaping Use   • Vaping Use: Never used   Substance and Sexual Activity   • Alcohol use: No   • Drug use: No   • Sexual activity: Defer     Family History   Problem Relation Age of Onset   • Pancreatic cancer Sister    • Pancreatic cancer Paternal Grandmother    • Malig Hyperthermia Neg Hx          Objective     Vitals:    02/14/22 1248   BP: 118/72   BP Location: Left arm   Patient Position: Sitting   Pulse: 95   Resp: 18   Temp: 96.8 °F (36 °C)   TempSrc: Temporal   Weight: 75.8 kg (167 lb)   Height: 167.6 cm (66\")   PainSc:   7     Body mass index is 26.95 kg/m².    STEADI Fall Risk Assessment has not been completed.     Review of Systems     ROS:  No fevers, chills, sweats, or body aches. No shortness of breath.     I have reviewed the HPI and ROS as documented by MA/RN. Chelo Villeda MD    Physical Exam     NAD, sitting in wheelchair  Normocephalic, atraumatic  No respiratory distress, no cough  2+ pitting edema BLE  BLE with multiple shallow open ulcerations and pitting edema noted. Small amount of dried drainage.  Moderate blanching erythema, no purulence, induration, fluctuance, active drainage, or heat.  Severe stasis changes BLE.    See photos for details.    LLE:                        RLE:                          Result Review :  The following data was reviewed by: Chelo Villeda MD on 02/14/2022:    Prior office notes and wound photos.  Recent ED visit, multiple recent labs.    Unable to include nursing wound documentation and measurements due to character limitations.             Assessment " and Plan   Diagnoses and all orders for this visit:    1. Venous stasis ulcer of left calf limited to breakdown of skin, unspecified whether varicose veins present (HCC) (Primary)    2. Venous stasis ulcer of right calf limited to breakdown of skin, unspecified whether varicose veins present (HCC)    3. Venous stasis of both lower extremities    4. Venous hypertension of both lower extremities    5. Noncompliance with treatment plan    6. Cellulitis of lower extremity, unspecified laterality  -     linezolid (Zyvox) 600 MG tablet; Take 1 tablet by mouth 2 (Two) Times a Day.  Dispense: 10 tablet; Refill: 0      I do not feel that the patient has a significant wound infection but apparently the wounds looked considerably worse on Friday.  As such, I will go ahead and put her on oral Zyvox which has the same bioavailability as IV.  She does not need IV antibiotics at this time.  Overall the wounds look better than the last visit with her that I had and they are slowly getting smaller.    Continue bacitracin to all wounds BLE. Wrap BLE with Kerlix and Ace wrap at ALL times. ELEVATE legs at all times while seated or lying down.     Patient is extremely difficult to treat because she does not take very good care of herself and is resistant to nearly all the wound care modalities we recommend.     She is very upset that her legs will not heal and says that she has even requested amputation of her legs in the past.  I have tried to explain to her repeatedly at every single visit that these problems would improve and go away if she would consistently follow our recommendations. I once again explained to her that managing the swelling is the only way her wounds will heal but she seems to just want a quick fix and unfortunately that is not an option for her condition.    Return in 3 weeks for recheck.     Patient was given instructions and counseling regarding their condition or for health maintenance advice, as well as the  wound care plan and recommendations. They understand and agree with the plan.  They will follow back up here in the clinic but are instructed to contact us in the interim should they have any new, returning, or worsening symptoms or concerns. Please see specific information pulled into the AVS if appropriate.       Follow Up   Return in about 3 weeks (around 3/7/2022) for Recheck.      Chelo Villeda MD

## 2022-02-15 ENCOUNTER — TELEPHONE (OUTPATIENT)
Dept: WOUND CARE | Facility: HOSPITAL | Age: 57
End: 2022-02-15

## 2022-02-15 NOTE — TELEPHONE ENCOUNTER
Called patient to inform her PA for ZYVOX has been submitted and an order for wound supplies was sent to [facility] on 2/14/22. Unable to reach patient or leave voicemail.

## 2022-02-16 ENCOUNTER — TELEPHONE (OUTPATIENT)
Dept: WOUND CARE | Facility: HOSPITAL | Age: 57
End: 2022-02-16

## 2022-02-16 LAB
BACTERIA SPEC AEROBE CULT: NORMAL
BACTERIA SPEC AEROBE CULT: NORMAL

## 2022-02-16 NOTE — TELEPHONE ENCOUNTER
Called patient about the denial of Zyvox by insurance. Informed her about a good rx coupon that would cost her $27-37 out of pocket at [facility] or [facility] pharmacy. Patient states she unable to afford that.  Discussed with Dr. Villeda, we resubmitted PA appeal paper to [employer] (via fax).  Patient notified.

## 2022-02-17 ENCOUNTER — TELEPHONE (OUTPATIENT)
Dept: WOUND CARE | Facility: HOSPITAL | Age: 57
End: 2022-02-17

## 2022-02-17 NOTE — TELEPHONE ENCOUNTER
PATIENT CALLED TO ASK ABOUT A CREAM TO MOISTURIZE HER LEGS; ADVISED HER TO USE AQUAPHOR OR PETROLEUM/VASELINE.  PATIENT WAS ALSO  NOTIFIED THAT HER ANTIBIOTIC HAD BEEN APPROVED THROUGH INSURANCE AND TO CONTACT HER PHARMACY ABOUT IT BEING FILLED.

## 2022-02-21 RX ORDER — CLOPIDOGREL BISULFATE 75 MG/1
75 TABLET ORAL DAILY
Qty: 30 TABLET | Refills: 0 | OUTPATIENT
Start: 2022-02-21

## 2022-02-28 RX ORDER — FUROSEMIDE 40 MG/1
40 TABLET ORAL DAILY
Qty: 90 TABLET | Refills: 1 | OUTPATIENT
Start: 2022-02-28

## 2022-03-01 RX ORDER — CLOPIDOGREL BISULFATE 75 MG/1
75 TABLET ORAL DAILY
Qty: 30 TABLET | Refills: 0 | OUTPATIENT
Start: 2022-03-01

## 2022-03-04 ENCOUNTER — OFFICE VISIT (OUTPATIENT)
Dept: FAMILY MEDICINE CLINIC | Facility: CLINIC | Age: 57
End: 2022-03-04

## 2022-03-04 VITALS
TEMPERATURE: 98.5 F | BODY MASS INDEX: 26.84 KG/M2 | SYSTOLIC BLOOD PRESSURE: 128 MMHG | DIASTOLIC BLOOD PRESSURE: 78 MMHG | HEIGHT: 67 IN | WEIGHT: 171 LBS | OXYGEN SATURATION: 99 % | HEART RATE: 98 BPM

## 2022-03-04 DIAGNOSIS — I83.022 VENOUS STASIS ULCER OF LEFT CALF, UNSPECIFIED ULCER STAGE, UNSPECIFIED WHETHER VARICOSE VEINS PRESENT: ICD-10-CM

## 2022-03-04 DIAGNOSIS — I83.012 VENOUS STASIS ULCER OF RIGHT CALF, UNSPECIFIED ULCER STAGE, UNSPECIFIED WHETHER VARICOSE VEINS PRESENT: Primary | ICD-10-CM

## 2022-03-04 DIAGNOSIS — L97.229 VENOUS STASIS ULCER OF LEFT CALF, UNSPECIFIED ULCER STAGE, UNSPECIFIED WHETHER VARICOSE VEINS PRESENT: ICD-10-CM

## 2022-03-04 DIAGNOSIS — L97.219 VENOUS STASIS ULCER OF RIGHT CALF, UNSPECIFIED ULCER STAGE, UNSPECIFIED WHETHER VARICOSE VEINS PRESENT: Primary | ICD-10-CM

## 2022-03-04 DIAGNOSIS — L03.119 CELLULITIS OF LOWER EXTREMITY, UNSPECIFIED LATERALITY: ICD-10-CM

## 2022-03-04 DIAGNOSIS — R19.7 DIARRHEA, UNSPECIFIED TYPE: ICD-10-CM

## 2022-03-04 PROCEDURE — 99213 OFFICE O/P EST LOW 20 MIN: CPT | Performed by: NURSE PRACTITIONER

## 2022-03-04 RX ORDER — ACETAMINOPHEN 500 MG
1 TABLET ORAL 2 TIMES DAILY
Qty: 30 CAPSULE | Refills: 0 | Status: SHIPPED | OUTPATIENT
Start: 2022-03-04 | End: 2022-10-21

## 2022-03-04 NOTE — PATIENT INSTRUCTIONS
Food Choices to Help Relieve Diarrhea, Adult  Diarrhea can make you feel weak and cause you to become dehydrated. It is important to choose the right foods and drinks to:  · Relieve diarrhea.  · Replace lost fluids and nutrients.  · Prevent dehydration.  What are tips for following this plan?  Relieving diarrhea  · Avoid foods that make your diarrhea worse. These may include:  ? Foods and beverages sweetened with high-fructose corn syrup, honey, or sweeteners such as xylitol, sorbitol, and mannitol.  ? Fried, greasy, or spicy foods.  ? Raw fruits and vegetables.  · Eat foods that are rich in probiotics. These include foods such as yogurt and fermented milk products. Probiotics can help increase healthy bacteria in your stomach and intestines (gastrointestinal tract or GI tract). This may help digestion and stop diarrhea.  · If you have lactose intolerance, avoid dairy products. These may make your diarrhea worse.  · Take medicine to help stop diarrhea only as told by your health care provider.  Replacing nutrients    · Eat bland, easy-to-digest foods in small amounts as you are able, until your diarrhea starts to get better. These foods include bananas, applesauce, rice, toast, and crackers.  · Gradually reintroduce nutrient-rich foods as tolerated or as told by your health care provider. This includes:  ? Well-cooked protein foods, such as eggs, lean meats like fish or chicken without skin, and tofu.  ? Peeled, seeded, and soft-cooked fruits and vegetables.  ? Low-fat dairy products.  ? Whole grains.  · Take vitamin and mineral supplements as told by your health care provider.    Preventing dehydration    · Start by sipping water or a solution to prevent dehydration (oral rehydration solution, ORS). This is a drink that helps replace fluids and minerals your body has lost. You can buy an ORS at pharmacies and retail stores.  · Try to drink at least 8-10 cups (2,000-2,500 mL) of fluid each day to help replace lost  fluids. If you have urine that is pale yellow, you are getting enough fluids.  · You may drink other liquids in addition to water, such as fruit juice that you have added water to (diluted fruit juice) or low-calorie sports drinks, as tolerated or as told by your health care provider.  · Avoid drinks with caffeine, such as coffee, tea, or soft drinks.  · Avoid alcohol.    Summary  · When you have diarrhea, it is important to choose the right foods and drinks to relieve diarrhea, to replace lost fluids and nutrients, and to prevent dehydration.  · Make sure you drink enough fluid to keep your urine pale yellow.  · You may benefit from eating bland foods at first. Gradually reintroduce healthy, nutrient-rich foods as tolerated or as told by your health care provider.  · Avoid foods that make your diarrhea worse, such as fried, greasy, or spicy foods.  This information is not intended to replace advice given to you by your health care provider. Make sure you discuss any questions you have with your health care provider.  Document Revised: 02/02/2021 Document Reviewed: 02/02/2021  Elsevier Patient Education © 2021 [employer]

## 2022-03-04 NOTE — PROGRESS NOTES
"Chief Complaint  Wound Infection (Sores on legs)    Subjective          Isha Llanes presents to [facility]  History of Present Illness   56-year-old female presents today for an acute visit, she is a patient of LUZ Amato.  She is complaining of sores on both lower extremities.  She states she has venous stasis ulcers.  She recently was seen at the emergency room about 1 month ago and she was prescribed Zyvox for acute cystitis and venous stasis ulcers.  She also does follow with wound care but states she has not been there this week.  She states she had to stop taking Zyvox because it started causing her diarrhea.  She states the diarrhea stopped after she stopped the antibiotic.  She does admit that she drinks a lot of milk.  She is allergic to multiple antibiotics.  She is also using bacitracin ointment.  Objective   Vital Signs:   /78   Pulse 98   Temp 98.5 °F (36.9 °C)   Ht 170.2 cm (67\")   Wt 77.6 kg (171 lb)   SpO2 99%   BMI 26.78 kg/m²     Physical Exam  Vitals reviewed.   Constitutional:       Appearance: Normal appearance. She is well-developed.   Cardiovascular:      Rate and Rhythm: Normal rate and regular rhythm.      Heart sounds: No murmur heard.  No friction rub. No gallop.    Pulmonary:      Effort: Pulmonary effort is normal.      Breath sounds: Normal breath sounds. No wheezing or rhonchi.   Skin:     General: Skin is warm and dry.      Comments: Bilateral lower extremities with open wounds, erythematous skin and mild edema, see scanned picture.   Neurological:      Mental Status: She is alert and oriented to person, place, and time.      Cranial Nerves: No cranial nerve deficit.   Psychiatric:         Mood and Affect: Mood and affect normal.         Behavior: Behavior normal.         Thought Content: Thought content normal. Thought content does not include homicidal or suicidal ideation.         Judgment: Judgment normal.      CLINICAL " PHOTOGRAPHS - SCAN (03/04/2022)    Result Review :                 Assessment and Plan    Diagnoses and all orders for this visit:    1. Venous stasis ulcer of right calf, unspecified ulcer stage, unspecified whether varicose veins present (HCC) (Primary)  Comments:  Advised to make sure she keeps her appointment with wound care, antibiotics as prescribed.    2. Venous stasis ulcer of left calf, unspecified ulcer stage, unspecified whether varicose veins present (HCC)    3. Cellulitis of lower extremity, unspecified laterality  Comments:  I advised her that she has to take the Zyvox twice daily as prescribed, will start probiotic to help prevent side effects, advised brat diet.      4. Diarrhea, unspecified type  Comments:  Advised to start probiotic twice daily while on antibiotic, advised brat diet, handout provided.  Advised to avoid dairy while on antibiotics.  Orders:  -     Clostridium Difficile Toxin, PCR - Stool, Per Rectum    Other orders  -     Probiotic, Lactobacillus, capsule; Take 1 each by mouth 2 (Two) Times a Day.  Dispense: 30 capsule; Refill: 0    Instructed patient to follow a BRAT diet (bananas, rice, applesauce, toast).  Instructed to avoid dairy, greasy, fatty foods, and other aggravating foods as listed on handout given to patient.  Patient instructed to slowly advance diet as tolerated as outlined on handout.    Follow Up   Return in about 1 week (around 3/11/2022) for with Kiko Luevano, 30 min apt for complex pt.  Patient was given instructions and counseling regarding her condition or for health maintenance advice. Please see specific information pulled into the AVS if appropriate.

## 2022-03-15 ENCOUNTER — TELEPHONE (OUTPATIENT)
Dept: FAMILY MEDICINE CLINIC | Facility: CLINIC | Age: 57
End: 2022-03-15

## 2022-03-15 NOTE — TELEPHONE ENCOUNTER
Caller: Isha Llanes    Relationship to patient: Self    Best call back number: [phone]    Chief complaint: LEG PAIN, RASH ON NOSE    Type of visit: OFFICE    Requested date: ASAP     If rescheduling, when is the original appointment: 3/17     Additional notes:PATIENT IS REQUESTING A SOONER APPOINTMENT FOR SYMPTOMS. SHE WANTS A CULTURE TO BE TAKEN OF RASH OR A STEROID TO BE CALLED IN.

## 2022-03-16 ENCOUNTER — TELEPHONE (OUTPATIENT)
Dept: GASTROENTEROLOGY | Facility: CLINIC | Age: 57
End: 2022-03-16

## 2022-03-16 NOTE — TELEPHONE ENCOUNTER
Spoke to pt and informed to stop taking Plavix seven days prior to procedure as directed per Blood Clearance that was signed by PCP and scanned into media on 1/18/2022. Pt verbalized understanding.

## 2022-03-18 ENCOUNTER — TELEPHONE (OUTPATIENT)
Dept: FAMILY MEDICINE CLINIC | Facility: CLINIC | Age: 57
End: 2022-03-18

## 2022-03-18 ENCOUNTER — TELEPHONE (OUTPATIENT)
Dept: WOUND CARE | Facility: HOSPITAL | Age: 57
End: 2022-03-18

## 2022-03-18 NOTE — TELEPHONE ENCOUNTER
PATIENT MISSED APPOINTMENT ON 03/17/2022 @ 1130 WITH EVARISTO MATTHEWS. STATED SHE WAS HAVING TROUBLE WITH TRANSPORTATION AND SHE'S IN A WHEELCHAIR. STATED SHE KNEW ABOUT THE POLICY CONCERNING MISSED APPOINTMENTS AND SAME DAY CANCELLATIONS.

## 2022-03-18 NOTE — TELEPHONE ENCOUNTER
"PATIENT CALLED REQUESTING A REFILL OF HER WOUND CARE SUPPLY ORDER. CALLED [facility] AND SPOKE WITH LIBRA AT [facility] AND REORDERED 4\" X 4\" GAUZE, ABD PADS, KERLIX, PAPER TAPE, AND ACE WRAPS.  "

## 2022-03-18 NOTE — TELEPHONE ENCOUNTER
Caller: Isha Llanes    Relationship: Self    Best call back number: [phone]    What medication are you requesting: STEROIDS    What are your current symptoms: SHORTNESS OF BREATH    How long have you been experiencing symptoms: A FEW DAYS     Have you had these symptoms before:    [x] Yes  [] No    Have you been treated for these symptoms before:   [x] Yes  [] No    If a prescription is needed, what is your preferred pharmacy and phone number: [facility] - [place], [place] - [address] [facility] [phone] [facility] [phone] FX     Additional notes:PATIENT STATED THAT OTHER MEMBERS OF THE HOUSEHOLD ARE EXPERIENCING THE SAME SYMPTOM AND IT IS NOT COVID RELATED, SHE HAS AN APPOINTMENT NEXT Friday AND WOULD LIKE TO GET THIS MEDICATION BEFORE THEN IF POSSIBLE

## 2022-03-21 RX ORDER — CLOPIDOGREL BISULFATE 75 MG/1
75 TABLET ORAL DAILY
Qty: 30 TABLET | Refills: 0 | OUTPATIENT
Start: 2022-03-21

## 2022-03-25 ENCOUNTER — OFFICE VISIT (OUTPATIENT)
Dept: FAMILY MEDICINE CLINIC | Facility: CLINIC | Age: 57
End: 2022-03-25

## 2022-03-25 VITALS
WEIGHT: 163.8 LBS | DIASTOLIC BLOOD PRESSURE: 80 MMHG | OXYGEN SATURATION: 95 % | BODY MASS INDEX: 25.71 KG/M2 | SYSTOLIC BLOOD PRESSURE: 134 MMHG | HEIGHT: 67 IN | TEMPERATURE: 97.6 F | HEART RATE: 69 BPM

## 2022-03-25 DIAGNOSIS — I83.012 VENOUS STASIS ULCER OF RIGHT CALF, UNSPECIFIED ULCER STAGE, UNSPECIFIED WHETHER VARICOSE VEINS PRESENT: ICD-10-CM

## 2022-03-25 DIAGNOSIS — J02.9 SORE THROAT: Primary | ICD-10-CM

## 2022-03-25 DIAGNOSIS — Z79.01 LONG TERM CURRENT USE OF ANTICOAGULANT THERAPY: ICD-10-CM

## 2022-03-25 DIAGNOSIS — Z79.4 TYPE 2 DIABETES MELLITUS WITH HYPERGLYCEMIA, WITH LONG-TERM CURRENT USE OF INSULIN: ICD-10-CM

## 2022-03-25 DIAGNOSIS — J44.9 CHRONIC OBSTRUCTIVE PULMONARY DISEASE, UNSPECIFIED COPD TYPE: ICD-10-CM

## 2022-03-25 DIAGNOSIS — F32.A DEPRESSION, UNSPECIFIED DEPRESSION TYPE: ICD-10-CM

## 2022-03-25 DIAGNOSIS — E11.65 TYPE 2 DIABETES MELLITUS WITH HYPERGLYCEMIA, WITH LONG-TERM CURRENT USE OF INSULIN: ICD-10-CM

## 2022-03-25 DIAGNOSIS — H10.33 ACUTE BACTERIAL CONJUNCTIVITIS OF BOTH EYES: ICD-10-CM

## 2022-03-25 DIAGNOSIS — L97.229 VENOUS STASIS ULCER OF LEFT CALF, UNSPECIFIED ULCER STAGE, UNSPECIFIED WHETHER VARICOSE VEINS PRESENT: ICD-10-CM

## 2022-03-25 DIAGNOSIS — I83.022 VENOUS STASIS ULCER OF LEFT CALF, UNSPECIFIED ULCER STAGE, UNSPECIFIED WHETHER VARICOSE VEINS PRESENT: ICD-10-CM

## 2022-03-25 DIAGNOSIS — F17.210 CIGARETTE NICOTINE DEPENDENCE WITHOUT COMPLICATION: ICD-10-CM

## 2022-03-25 DIAGNOSIS — F41.1 GENERALIZED ANXIETY DISORDER: ICD-10-CM

## 2022-03-25 DIAGNOSIS — L97.219 VENOUS STASIS ULCER OF RIGHT CALF, UNSPECIFIED ULCER STAGE, UNSPECIFIED WHETHER VARICOSE VEINS PRESENT: ICD-10-CM

## 2022-03-25 LAB
EXPIRATION DATE: NORMAL
INTERNAL CONTROL: NORMAL
Lab: NORMAL
S PYO AG THROAT QL: NEGATIVE

## 2022-03-25 PROCEDURE — 99214 OFFICE O/P EST MOD 30 MIN: CPT | Performed by: NURSE PRACTITIONER

## 2022-03-25 PROCEDURE — 87070 CULTURE OTHR SPECIMN AEROBIC: CPT | Performed by: NURSE PRACTITIONER

## 2022-03-25 PROCEDURE — 87880 STREP A ASSAY W/OPTIC: CPT | Performed by: NURSE PRACTITIONER

## 2022-03-25 PROCEDURE — 87147 CULTURE TYPE IMMUNOLOGIC: CPT | Performed by: NURSE PRACTITIONER

## 2022-03-25 PROCEDURE — 87186 SC STD MICRODIL/AGAR DIL: CPT | Performed by: NURSE PRACTITIONER

## 2022-03-25 PROCEDURE — 87205 SMEAR GRAM STAIN: CPT | Performed by: NURSE PRACTITIONER

## 2022-03-25 RX ORDER — CLOPIDOGREL BISULFATE 75 MG/1
75 TABLET ORAL DAILY
Qty: 90 TABLET | Refills: 0 | Status: SHIPPED | OUTPATIENT
Start: 2022-03-25 | End: 2022-09-21 | Stop reason: SDUPTHER

## 2022-03-25 RX ORDER — ERYTHROMYCIN 5 MG/G
OINTMENT OPHTHALMIC 2 TIMES DAILY
Qty: 1 G | Refills: 1 | Status: SHIPPED | OUTPATIENT
Start: 2022-03-25 | End: 2022-04-04

## 2022-03-25 RX ORDER — INSULIN LISPRO 100 U/ML
INJECTION, SOLUTION SUBCUTANEOUS
Qty: 2 PEN | Refills: 0 | Status: SHIPPED | OUTPATIENT
Start: 2022-03-25 | End: 2023-01-12 | Stop reason: SDUPTHER

## 2022-03-25 RX ORDER — FUROSEMIDE 40 MG/1
40 TABLET ORAL DAILY
Qty: 90 TABLET | Refills: 1 | Status: SHIPPED | OUTPATIENT
Start: 2022-03-25 | End: 2022-09-19

## 2022-03-27 LAB
BACTERIA SPEC AEROBE CULT: ABNORMAL
GRAM STN SPEC: ABNORMAL

## 2022-03-28 DIAGNOSIS — J44.9 CHRONIC OBSTRUCTIVE PULMONARY DISEASE, UNSPECIFIED COPD TYPE: ICD-10-CM

## 2022-03-31 ENCOUNTER — TELEPHONE (OUTPATIENT)
Dept: WOUND CARE | Facility: HOSPITAL | Age: 57
End: 2022-03-31

## 2022-03-31 ENCOUNTER — TELEPHONE (OUTPATIENT)
Dept: FAMILY MEDICINE CLINIC | Facility: CLINIC | Age: 57
End: 2022-03-31

## 2022-03-31 NOTE — TELEPHONE ENCOUNTER
Spoke to patient per provider request about medication for infection. Patient stated that she didn't want to take the antibiotic due to stomach pain and diarrhea. Patient stated that she will be interested in the picc line.Provider spoke with patient as well. Patient stated that she understood.

## 2022-03-31 NOTE — PROGRESS NOTES
"   Progress Note      Patient Name: Isha Llanes   Patient ID: [id number]   Sex: Female   YOB: 1965    Primary Care Provider: Damari Cornejo PA-C   Referring Provider: Akua Martinez MD    Visit Date: May 21, 2021    Provider: Damari Cornejo PA-C   Location: [facility]   Location Address: [address] [place] [address]  [id number]   Location Phone: [phone]          Chief Complaint  · Acute  · \"wound on leg has opened up\"  · \"abd pain\"  · \"referral to cancer doctor\"   · \"can't sleep more than 2 hours\"      History Of Present Illness  Isha Llanes is a 55 year old /White female who presents for evaluation and treatment of:      follow up on number complaints    Wound on leg - opened up this morning   Swelling is not as bad as it normally is, wound care wants her to go to lymphedema clinic on May 27th   Swelling has gotten better since her valve replacement   Creams that wound care gave her are not working, doesn't feel like the wounds are getting better  Wound care prescribed SSD 1% and triamcinolone cream BID for her wounds but she doesn't understand exactly what she is suppose to do    Wears oxygen as she needs it - wears it at night and napping  Lost her pulse ox she was using at home, no way of knowing her oxygen  Breathing treatments are helping with SOA     Couldn't do sleep study at home because she is not sleeping much so she cancelled   Has not been sleeping for more than 4 hours at night   Not sleeping because she is uncomfortable from the pain in her hips and her mind is racing, has problems settling down at night   Has problems falling asleep and staying asleep  Took melatonin in the past and it did not help, took Xanax in the past and it helped  Refused trazodone or other sleep aid because it ''makes symptoms worse''    Home health stopped coming to her because she was noncompliant     She states she was discharged from [facility] because she said something about one of the workers and thinks she took it offensively       Past Medical History  Disease Name Date Onset Notes   Allergic rhinitis due to allergen 01/16/2020 Discussed OTC antihistamine for allergic sx prn.   Anticoagulation Medication Therapy --  --    Anxiety disorder 01/16/2020 Discussed anxiety and depression, pt declines SSRI, buspar, hydroxyzine. She states Xanax is only thing effective in past. discussed risks of benzo use including death, respiratory depression. Discussed in detail with pt that we will not rx controlled medication for anxiety. She was given handout for local psychiatrist to call and schedule apt. to er if si/hi.    Aortic stenosis --  --    Aortic stenosis 01/21/2021 --    Arthritis 1/16/2020 Keep follow up with pain mgmt. Will refer to PT for stretches/strengthening. Discussed we will not rx pain meds from our office.   Asthma --  --    Back Pain  --  --    Bladder disorder --  --    Bowel Disease --  --    Breast lump --  --    CAD (coronary artery disease) 09/16/2020 --    Cancer --  --    CHF (congestive heart failure) 01/16/2020 Will request note from cardiology, new referral placed today. requesting previous pcp records as well as cardiology records   Chronic Obstructive Pulmonary Disease --  No current sx. Encouraged smoking cessation   Chronic pain 01/16/2020 Keep follow up with pain mgmt. We will request their records.    COPD (chronic obstructive pulmonary disease) 01/16/2020 Encouraged smoking cessation. requesting previous records.   Depression --  Discussed med options. Pt declined. Given handout to call psych.   Diabetes --  --    Diabetes mellitus, type 2 01/16/2020 requesting recent labs. Pt has not been taking Januvia due to side effects. Will hold off starting new medication until we see recent a1c.   GERD (gastroesophageal reflux disease) --  Referred back to gi for updated EGD and colonoscopy.   Heart Disease --  --    Heart murmur  01/16/2020 --    Hepatic steatosis 09/16/2020 --    Hiatal hernia 09/16/2020 --    History of Hodgkin's lymphoma 01/16/2020 Requesting previous record, will refer back to oncology since pt has not had recent follow up.    Hyperlipemia --  --    Kidney Disease --  --    Kidney Stones --  --    Knee pain 01/16/2020 Discussed knee pain, will refer to home health for PT. No meds since pt est with pain mgmt.   Limb Pain --  --    Limb Swelling --  --    Lupus (systemic lupus erythematosus) --  --    Mitral valve prolapse --  --    Neck pain --  --    Neurologic disorder --  --    Nicotine dependence 01/16/2020 encouraged smoking cessation, pt declined.    Night sweats --  --    Nipple discharge --  --    Peripheral vascular disease 01/16/2020 Discussed PVD noted on patient legs. Cont compression/compression socks. Will refer home health to help with wound care of 2 small ulcers on back of L calf. Pt will rtc if sx worsen, fever, odor, exudate.    Presence of intrathecal pump 01/16/2020 Pt has pain pump    Pulmonary embolus --  --    Renal calculus or stone --  --    Renal stone 09/16/2020 --    Sinus trouble --  Use otc antihistamine.   Skin breakdown 01/16/2020 Keep lesions clean and dry. We will send home health to do wound care on posterior calf lesions   Ulcer --  --          Past Surgical History  Procedure Name Date Notes   Back --  --    Back surgery --  --    Bladder Repair --  Early 2000s   Cholecystectomy 2001 --    Colonoscopy --  --    Direct revascularization heart muscle with catheter stent, prosthesis, or vein graft 2019 --    Gallbladder --  --    heart surgery --  --    Hemorrhoidectomy --  --    Hernia --  --    Hysterectomy --  --    Hysterectomy-Abdominal 1996 --    Lymph Node Excision --  --    Pain Pump 2013, 2019 --    Partial Hysterectomy --  --    Port Placement --  --    Spinal Fusion 1998 --    Tonsillectomy --  --    Tumor removal --  between vaginal and rectal wall bladder-2000          Medication List  Name Date Started Instructions   Admelog SoloStar U-100 Insulin 100 unit/mL subcutaneous insulin pen  inject by subcutaneous route per prescriber's instructions. Insulin dosing requires individualization.   Blood Glucose Test miscellaneous strip 06/03/2020 use as directed to check blood sugar TID   Flovent  mcg/actuation inhalation HFA aerosol inhaler 04/21/2021 inhale 2 puffs (220 mcg) by inhalation route 2 times per day   furosemide 40 mg oral tablet 04/12/2021 take 1 tablet (40 mg) by oral route once daily for 90 days   gabapentin 300 mg oral capsule  take 1 capsule (300 mg) by oral route 3 times per day   gentamicin 0.1 % topical ointment  apply a small amount to the affected area by topical route 2 times per day   hydroxyzine HCl 25 mg oral tablet  take 1 tablet (25 mg) by oral route 3 times per day   ipratropium-albuterol 0.5 mg-3 mg(2.5 mg base)/3 mL inhalation solution for nebulization 04/07/2021 inhale 3 milliliters by nebulization route 4 times per day and as needed, up to 6 doses per day   lancets miscellaneous misc 06/03/2020 use as directed   nystatin 100,000 unit/gram topical powder 03/20/2021 apply to the affected area(s) by topical route 2 times per day   ondansetron HCl 8 mg oral tablet 04/15/2021 take 1 tablet (8 mg) by oral route every 8 hours as needed for nausea.   Pain Pump-Dilaudid  --    Plavix 75 mg oral tablet  take 1 tablet (75 mg) by oral route once daily   Pulmicort 0.5 mg/2 mL inhalation suspension for nebulization 04/07/2021 inhale 2 milliliters (0.5 mg) by nebulization route 2 times per day   Tresiba FlexTouch U-100 100 unit/mL (3 mL) subcutaneous insulin pen  inject by subcutaneous route as per insulin protocol   triamcinolone acetonide 0.1 % topical ointment 01/06/2021 apply a thin layer to the affected area(s) by topical route 3 times per day         Allergy List  Allergen Name Date Reaction Notes   amoxicillin --  SOA --    aspirin --  tinnitus --   "  CEPHALOSPORINS --  lip swelling --    clarithromycin --  unknown --    Compazine --  unknown --    Egg --  --  --    erythromycin --  --  --    IVP Dye --  \"bad reaction\" --    NSAIDS --  unknown --    PENICILLINS --  unknown --    Phenergan --  unknown --    rifampin --  hallucinations --    SULFA (SULFONAMIDES) --  unknown --        Allergies Reconciled  Family Medical History  Disease Name Relative/Age Notes   Heart Disease Daughter/  Father/  Mother/  Sister/   Mother; Father; Sister; Daughter   Cancer, Unspecified Father/  Mother/  Sister/   Mother; Father; Sister   Diabetes, unspecified type Daughter/  Father/  Mother/  Sister/   Mother; Father; Sister; Daughter  Mother   Spine Problems Mother/   --    Heart Attack (MI) Mother/   --    Renal Calculus Mother/   Mother   Family history of breast cancer Mother/   Mother   Renal Cancer Mother/   Mother  Mother/70s   -Father's Family History Unknown Father/   Father   Bladder calculus Grandmother (maternal)/  Mother/   Mother; Grandmother (maternal)   Blood Diseases Father/   Father   Osteoporosis Mother/  Sister/   Mother; Sister   Family history of Arthritis Brother/  Daughter/  Mother/   Mother; Brother; Daughter         Reproductive History  Menstrual   Menopause Status: Postmenopausal HRT?: No   Pregnancy Summary   Total Pregnancies: 0 Full Term: 0 Premature: 0   Ab Induced: 0 Ab Spontaneous: 0 Ectopics: 0   Multiples: 0 Livin         Social History  Finding Status Start/Stop Quantity Notes   Alcohol Use Never --/-- --  does not drink   Caffeine Current - status unknown --/-- --  drinks coffee  drinks regularly; 3-4 times per day   Claustophobic Unknown --/-- --  yes   Disabled --  --/-- --  --     --  --/-- --  two- one    lives with children --  --/-- --  --    Recreational Drug Use Never --/-- --  no   Second hand smoke exposure Current some day --/-- --  yes   Tobacco Current every day --/-- 2 ppd --          Vitals  Date Time BP " "Position Site L\R Cuff Size HR RR TEMP (F) WT  HT  BMI kg/m2 BSA m2 O2 Sat FR L/min FiO2 HC       05/21/2021 11:22 /76 Sitting    112 - R  97.6 170lbs 0oz 5'  4\" 29.18 1.87 96 %  21%          Physical Examination  · Constitutional  o Appearance  o : no acute distress, well-nourished  · Head and Face  o Head  o :   § Inspection  § : atraumatic, normocephalic  · Eyes  o Eyes  o : extraocular movements intact, no scleral icterus, no conjunctival injection  · Ears, Nose, Mouth and Throat  o Ears  o :   § External Ears  § : normal  o Nose  o :   § Intranasal Exam  § : nares patent  o Oral Cavity  o :   § Oral Mucosa  § : moist mucous membranes  · Respiratory  o Respiratory Effort  o : breathing comfortably, symmetric chest rise  o Auscultation of Lungs  o : clear, decreased breath sounds throughout bilaterally  · Cardiovascular  o Heart  o :   § Auscultation of Heart  § : regular rate, normal rhythm, murmur from mechanical heart valve, no pericardial friction rub  o Peripheral Vascular System  o :   § Extremities  § : edema bilateral legs  · Gastrointestinal  o Abdominal Examination  o :   § Abdomen  § : bowel sounds present, non-distended, non-tender  · Skin and Subcutaneous Tissue  o General Inspection  o : surgical scar from valve replacement on left chest that is approximated and pink, no drainage or redness. Chronic venous stasis bilaterally. Open wound on left lateral portion of calf, no purulent drainage, base of wound pink, approximated 2mhY3vy.   · Neurologic  o Mental Status Examination  o :   § Orientation  § : grossly oriented to person, place and time  o Gait and Station  o :   § Gait Screening  § : normal gait  · Psychiatric  o General  o : normal mood and affect          Results  · In-Office Procedures  o Lab procedure  § IOP - Urinalysis without Microscopy (Clinitek) [facility] ([id number])   § Color Ur: Yellow   § Clarity Ur: Slightly cloudy   § Glucose Ur Ql Strip: Negative   § Bilirub Ur Ql Strip: Negative "   § Ketones Ur Ql Strip: Negative   § Sp Gr Ur Qn: 1.025   § Hgb Ur Ql Strip: Small   § pH Ur-LsCnc: 5.5   § Prot Ur Ql Strip: Trace   § Urobilinogen Ur Strip-mCnc: 0.2 E.U./dL   § Nitrite Ur Ql Strip: Negative   § WBC Est Ur Ql Strip: Small       Assessment  · Chronic pain     338.29/G89.29  Pain clinic in [facility] will no longer see her. Suggested that she go to the pain clinic in [place] to help manage her Dilaudid pump and pain.  · COPD (chronic obstructive pulmonary disease)     496/J44.9  Symptoms are improving with breath treatments and supplemental oxygen at home. Discussed how insurance will not cover a pulse oximeter to replace the one she lost. She will have to buy this on her own. Until she gets a replacement, if she feels short of breath she needs to call the office or go to the ER to have her oxygen saturation checked.  · Diabetes mellitus, type 2     250.00/E11.9  Has missed numerous phone calls with diabetes clinic. Has not been wearing her glucose monitoring pump. Encouraged her to return the phone calls from their office to arrange for education regarding the pump.  · Back Pain      724.4  · Limb Swelling     729.81/M79.89  Limb swelling has improved. Encouraged patient to keep appointment with lymphedema clinic.  · Leg ulcer     707.10/L97.909  Reviewed the medicated cream wound care prescribed to her. If she has any more questions regarding wound care she needs to contact their office.  · Insomnia     780.52/G47.00  Patient refuses to try trazadone because she has had nightmares with it in the past. She has not completed the home sleep apnea study that was ordered previously. Discussed the option of going to the sleep clinic to do an in person sleep study. After she completes this study we can determine what interventions are appropriate to treat the underlying problem.      Plan  · Orders  o ACO-39: Current medications updated and reviewed (8889I, ) - -  05/21/2021  · Medications  o Medications have been Reconciled  o Transition of Care or Provider Policy  · Instructions  o Patient was educated/instructed on their diagnosis, treatment and medications prior to discharge from the clinic today.  · Disposition  o Call or Return if symptoms worsen or persist.  o Keep follow up appt as scheduled            Electronically Signed by: Damari Cornejo PA-C -Author on May 21, 2021 07:19:47 PM   Alert-The patient is alert, awake and responds to voice. The patient is oriented to time, place, and person. The triage nurse is able to obtain subjective information.

## 2022-04-01 ENCOUNTER — TELEPHONE (OUTPATIENT)
Dept: FAMILY MEDICINE CLINIC | Facility: CLINIC | Age: 57
End: 2022-04-01

## 2022-04-01 DIAGNOSIS — L03.119 CELLULITIS OF LOWER EXTREMITY, UNSPECIFIED LATERALITY: Primary | ICD-10-CM

## 2022-04-01 RX ORDER — LINEZOLID 2 MG/ML
600 INJECTION, SOLUTION INTRAVENOUS EVERY 12 HOURS
Qty: 4200 ML | Refills: 0 | Status: SHIPPED | OUTPATIENT
Start: 2022-04-01 | End: 2022-04-04 | Stop reason: CLARIF

## 2022-04-01 NOTE — TELEPHONE ENCOUNTER
I gave the prescription to Jan.  Please send on Monday for her to start her IV piggyback infusion.

## 2022-04-01 NOTE — TELEPHONE ENCOUNTER
Spoke to [facility] at [phone] and they stated that the dont have Zyvox and will have to order it for patient. Stated it should be here by Monday April 4 2022. Office stated that they need a prescription pad order, Demographics and to fax it to [phone]. And to call back if Monday was okay. Please advise.

## 2022-04-04 DIAGNOSIS — H10.33 ACUTE BACTERIAL CONJUNCTIVITIS OF BOTH EYES: ICD-10-CM

## 2022-04-04 DIAGNOSIS — L03.119 CELLULITIS OF LOWER EXTREMITY, UNSPECIFIED LATERALITY: Primary | ICD-10-CM

## 2022-04-04 DIAGNOSIS — B99.9 INFECTION: ICD-10-CM

## 2022-04-04 DIAGNOSIS — I87.303 VENOUS HYPERTENSION OF BOTH LOWER EXTREMITIES: ICD-10-CM

## 2022-04-04 RX ORDER — ERYTHROMYCIN 5 MG/G
OINTMENT OPHTHALMIC
Qty: 3.5 G | Refills: 1 | Status: SHIPPED | OUTPATIENT
Start: 2022-04-04 | End: 2022-04-12

## 2022-04-04 NOTE — ASSESSMENT & PLAN NOTE
Patient's depression is recurrent and is severe without psychosis. Their depression is currently active and the condition is unchanged. This will be reassessed in 4 weeks. F/U as described:Unable to tolerate SSRIs and SNRIs.  Discussed counseling #.

## 2022-04-04 NOTE — ASSESSMENT & PLAN NOTE
Psychological condition is unchanged.  Continue current treatment regimen.  Psychological condition  will be reassessed in 4 weeks.  Continue taking Atarax as needed.    Since patient is on a pain pump and on gabapentin we will hold off on Xanax

## 2022-04-04 NOTE — TELEPHONE ENCOUNTER
Patient ist stating she has a staph infection on her leg and is needing IV from Home Health and no one has contacted her. She is very upset. Please call and advise [phone]

## 2022-04-04 NOTE — ASSESSMENT & PLAN NOTE
COPD is unchanged.  Discussed monitoring symptoms and use of quick-relief medications and contacting us early in the course of exacerbations.  Warning signs of respiratory distress were reviewed with the patient.   Counseled to avoid exposure to cigarette smoke.  Follow up in 1 month, or sooner should new symptoms or problems arise.

## 2022-04-05 ENCOUNTER — OFFICE VISIT (OUTPATIENT)
Dept: WOUND CARE | Facility: HOSPITAL | Age: 57
End: 2022-04-05

## 2022-04-05 ENCOUNTER — TELEPHONE (OUTPATIENT)
Dept: WOUND CARE | Facility: HOSPITAL | Age: 57
End: 2022-04-05

## 2022-04-05 VITALS
SYSTOLIC BLOOD PRESSURE: 94 MMHG | HEIGHT: 67 IN | WEIGHT: 163 LBS | HEART RATE: 86 BPM | TEMPERATURE: 97.3 F | DIASTOLIC BLOOD PRESSURE: 60 MMHG | RESPIRATION RATE: 18 BRPM | BODY MASS INDEX: 25.58 KG/M2

## 2022-04-05 DIAGNOSIS — I87.8 VENOUS STASIS OF BOTH LOWER EXTREMITIES: Primary | ICD-10-CM

## 2022-04-05 PROCEDURE — 99213 OFFICE O/P EST LOW 20 MIN: CPT | Performed by: SURGERY

## 2022-04-05 PROCEDURE — G0463 HOSPITAL OUTPT CLINIC VISIT: HCPCS | Performed by: SURGERY

## 2022-04-05 NOTE — PROGRESS NOTES
"Chief Complaint  Wound Check (BLE ULCERS)    Subjective            Objective     Vitals:    04/05/22 1125   BP: 94/60   BP Location: Left arm   Patient Position: Sitting   Pulse: 86   Resp: 18   Temp: 97.3 °F (36.3 °C)   TempSrc: Temporal   Weight: 73.9 kg (163 lb)   Height: 170.2 cm (67\")   PainSc:   8         I have reviewed the HPI and ROS as documented by MA/RN. Sachin Peñaloza MD    Physical Exam     Wound Description:  The patient has several superficial ulcers which appear to be stasis ulcers on both legs.  The ulcers evidently have been cultured by the patient's family physician and was told that this cultured out staph.  Her family physician has made arrangements for her to get IV daptomycin.  I have asked the patient to start applying Silvadene 1% to the wounds.  She is to wash both legs with Dial soap on a daily basis prior to applying the Silvadene.  She is to wrap the legs after applying the Silvadene.  Has been asked to return to see us again in 3 weeks.    Result Review :   The following data was reviewed by: Sachin Peñaloza MD on 04/05/2022:               Assessment and Plan    Diagnoses and all orders for this visit:    1. Venous stasis of both lower extremities (Primary)            Follow Up   Return in about 3 weeks (around 4/26/2022).  Patient was given instructions and counseling regarding her condition or for health maintenance advice. Please see specific information pulled into the AVS if appropriate.       "

## 2022-04-05 NOTE — SIGNIFICANT NOTE
Epithelial %: 0%    Exposed Bone: no    Exposed Tendon: no    Impression: unchanged    Short Term Goals: INCREASE GRANULATION AND DECREASE SIZE

## 2022-04-06 ENCOUNTER — TELEPHONE (OUTPATIENT)
Dept: GASTROENTEROLOGY | Facility: CLINIC | Age: 57
End: 2022-04-06

## 2022-04-11 ENCOUNTER — TELEPHONE (OUTPATIENT)
Dept: FAMILY MEDICINE CLINIC | Facility: CLINIC | Age: 57
End: 2022-04-11

## 2022-04-11 ENCOUNTER — NURSE TRIAGE (OUTPATIENT)
Dept: CALL CENTER | Facility: HOSPITAL | Age: 57
End: 2022-04-11

## 2022-04-11 NOTE — TELEPHONE ENCOUNTER
"Reviewed guideline with caller, advises she be seen within 4 hours. Caller states she has no transportation. Attempted warm transfer to [facility], Nicolette states pt's PCP is off today, she will send this message to the MA, if they can send prescription in they will or they may message Mr. Luevano. If pt. Does not hear back today call office back tomorrow when her PCP will be there. Caller advised of these instructions.     Reason for Disposition  • [1] MILD difficulty breathing (e.g., minimal/no SOB at rest, SOB with walking, pulse <100) AND [2] NEW-onset or WORSE than normal    Additional Information  • Negative: SEVERE difficulty breathing (e.g., struggling for each breath, speaks in single words)  • Negative: [1] Breathing stopped AND [2] hasn't returned  • Negative: Choking on something  • Negative: Bluish (or gray) lips or face now  • Negative: Difficult to awaken or acting confused (e.g., disoriented, slurred speech)  • Negative: Passed out (i.e., lost consciousness, collapsed and was not responding)  • Negative: Wheezing started suddenly after medicine, an allergic food or bee sting  • Negative: Stridor  • Negative: Slow, shallow and weak breathing  • Negative: Sounds like a life-threatening emergency to the triager  • Negative: Chest pain  • Negative: [1] Wheezing (high pitched whistling sound) AND [2] previous asthma attacks or use of asthma medicines  • Negative: [1] Difficulty breathing AND [2] only present when coughing  • Negative: [1] Difficulty breathing AND [2] only from stuffy or runny nose  • Negative: [1] Difficulty breathing AND [2] within 14 days of COVID-19 Exposure  • Negative: [1] MODERATE difficulty breathing (e.g., speaks in phrases, SOB even at rest, pulse 100-120) AND [2] NEW-onset or WORSE than normal  • Negative: Wheezing can be heard across the room  • Negative: Drooling or spitting out saliva (because can't swallow)  • Negative: History of prior \"blood clot\" in leg or lungs " "(i.e., deep vein thrombosis, pulmonary embolism)  • Negative: History of inherited increased risk of blood clots (e.g., Factor 5 Leiden, Anti-thrombin 3, Protein C or Protein S deficiency, Prothrombin mutation)  • Negative: Major surgery in the past month  • Negative: Hip or leg fracture (broken bone) in past month (or had cast on leg or ankle in past month)  • Negative: Illness requiring prolonged bedrest in past month (e.g., immobilization, long hospital stay)  • Negative: Long-distance travel in past month (e.g., car, bus, train, plane; with trip lasting 6 or more hours)  • Negative: Cancer treatment in past six months (or has cancer now)  • Negative: Extra heart beats OR irregular heart beating   (i.e., \"palpitations\")  • Negative: Fever > 103 F (39.4 C)  • Negative: [1] Fever > 101 F (38.3 C) AND [2] age > 60 years  • Negative: [1] Fever > 100.0 F (37.8 C) AND [2] bedridden (e.g., nursing home patient, CVA, chronic illness, recovering from surgery)  • Negative: [1] Fever > 100.0 F (37.8 C) AND [2] diabetes mellitus or weak immune system (e.g., HIV positive, cancer chemo, splenectomy, organ transplant, chronic steroids)  • Negative: [1] Periods where breathing stops and then resumes normally AND [2] bedridden (e.g., nursing home patient, CVA)  • Negative: Pregnant or postpartum (from 0 to 6 weeks after delivery)  • Negative: Patient sounds very sick or weak to the triager    Answer Assessment - Initial Assessment Questions  1. RESPIRATORY STATUS: \"Describe your breathing?\" (e.g., wheezing, shortness of breath, unable to speak, severe coughing)       \"a weird feeling in her chest\" \"like when she had yeast in her lungs before.\"  2. ONSET: \"When did this breathing problem begin?\"       A few days after IV antibiotics were started  3. PATTERN \"Does the difficult breathing come and go, or has it been constant since it started?\"       constant  4. SEVERITY: \"How bad is your breathing?\" (e.g., mild, moderate, severe) " "    - MILD: No SOB at rest, mild SOB with walking, speaks normally in sentences, can lay down, no retractions, pulse < 100.     - MODERATE: SOB at rest, SOB with minimal exertion and prefers to sit, cannot lie down flat, speaks in phrases, mild retractions, audible wheezing, pulse 100-120.     - SEVERE: Very SOB at rest, speaks in single words, struggling to breathe, sitting hunched forward, retractions, pulse > 120       \" just is uncomfortable and little congested, doesn't feel right\"  5. RECURRENT SYMPTOM: \"Have you had difficulty breathing before?\" If Yes, ask: \"When was the last time?\" and \"What happened that time?\"       Yes, when she yeast pneumonia following IV antibiotics   6. CARDIAC HISTORY: \"Do you have any history of heart disease?\" (e.g., heart attack, angina, bypass surgery, angioplasty)       History of valve replacement  7. LUNG HISTORY: \"Do you have any history of lung disease?\"  (e.g., pulmonary embolus, asthma, emphysema)      COPD  8. CAUSE: \"What do you think is causing the breathing problem?\"       Thinks she has yeast in her lungs again   9. OTHER SYMPTOMS: \"Do you have any other symptoms? (e.g., dizziness, runny nose, cough, chest pain, fever)      Congestion in her lungs, states her knuckles are ashy,  stomach upset  10. PREGNANCY: \"Is there any chance you are pregnant?\" \"When was your last menstrual period?\"        no  11. TRAVEL: \"Have you traveled out of the country in the last month?\" (e.g., travel history, exposures)        no    Protocols used: BREATHING DIFFICULTY-ADULT-AH      "

## 2022-04-11 NOTE — TELEPHONE ENCOUNTER
[facility] WAS UNABLE TO WARM TRANSFER     Caller: Isha Llanes    Relationship: Self    Best call back number: [phone]      What is the best time to reach you: ANYTIME     Who are you requesting to speak with (clinical staff, provider,  specific staff member): CLINICAL         What was the call regarding: PATIENT IS CALLING ABOUT A MEDICATION FOR YEAST, STATES HAVING THE SYMPTOMS AS PRIOR BEFORE WITH YEAST.     Do you require a callback: YES

## 2022-04-12 ENCOUNTER — LAB REQUISITION (OUTPATIENT)
Dept: LAB | Facility: HOSPITAL | Age: 57
End: 2022-04-12

## 2022-04-12 DIAGNOSIS — Z00.00 ENCOUNTER FOR GENERAL ADULT MEDICAL EXAMINATION WITHOUT ABNORMAL FINDINGS: ICD-10-CM

## 2022-04-12 LAB
BASOPHILS # BLD AUTO: 0.01 10*3/MM3 (ref 0–0.2)
BASOPHILS NFR BLD AUTO: 0.2 % (ref 0–1.5)
DEPRECATED RDW RBC AUTO: 49.9 FL (ref 37–54)
EOSINOPHIL # BLD AUTO: 0.17 10*3/MM3 (ref 0–0.4)
EOSINOPHIL NFR BLD AUTO: 3.1 % (ref 0.3–6.2)
ERYTHROCYTE [DISTWIDTH] IN BLOOD BY AUTOMATED COUNT: 15.7 % (ref 12.3–15.4)
HCT VFR BLD AUTO: 44.8 % (ref 34–46.6)
HGB BLD-MCNC: 13.3 G/DL (ref 12–15.9)
IMM GRANULOCYTES # BLD AUTO: 0.02 10*3/MM3 (ref 0–0.05)
IMM GRANULOCYTES NFR BLD AUTO: 0.4 % (ref 0–0.5)
LYMPHOCYTES # BLD AUTO: 1.32 10*3/MM3 (ref 0.7–3.1)
LYMPHOCYTES NFR BLD AUTO: 23.7 % (ref 19.6–45.3)
MCH RBC QN AUTO: 26 PG (ref 26.6–33)
MCHC RBC AUTO-ENTMCNC: 29.7 G/DL (ref 31.5–35.7)
MCV RBC AUTO: 87.7 FL (ref 79–97)
MONOCYTES # BLD AUTO: 0.39 10*3/MM3 (ref 0.1–0.9)
MONOCYTES NFR BLD AUTO: 7 % (ref 5–12)
NEUTROPHILS NFR BLD AUTO: 3.65 10*3/MM3 (ref 1.7–7)
NEUTROPHILS NFR BLD AUTO: 65.6 % (ref 42.7–76)
NRBC BLD AUTO-RTO: 0 /100 WBC (ref 0–0.2)
PLATELET # BLD AUTO: 174 10*3/MM3 (ref 140–450)
PMV BLD AUTO: 10.7 FL (ref 6–12)
RBC # BLD AUTO: 5.11 10*6/MM3 (ref 3.77–5.28)
WBC NRBC COR # BLD: 5.56 10*3/MM3 (ref 3.4–10.8)

## 2022-04-12 PROCEDURE — 85025 COMPLETE CBC W/AUTO DIFF WBC: CPT | Performed by: NURSE PRACTITIONER

## 2022-04-12 PROCEDURE — 82550 ASSAY OF CK (CPK): CPT | Performed by: NURSE PRACTITIONER

## 2022-04-12 PROCEDURE — 85652 RBC SED RATE AUTOMATED: CPT | Performed by: NURSE PRACTITIONER

## 2022-04-12 PROCEDURE — 80053 COMPREHEN METABOLIC PANEL: CPT | Performed by: NURSE PRACTITIONER

## 2022-04-12 PROCEDURE — 86140 C-REACTIVE PROTEIN: CPT | Performed by: NURSE PRACTITIONER

## 2022-04-12 RX ORDER — FLUCONAZOLE 150 MG/1
150 TABLET ORAL ONCE
Qty: 2 TABLET | Refills: 0 | Status: SHIPPED | OUTPATIENT
Start: 2022-04-12 | End: 2022-04-12

## 2022-04-13 ENCOUNTER — ANESTHESIA EVENT (OUTPATIENT)
Dept: GASTROENTEROLOGY | Facility: HOSPITAL | Age: 57
End: 2022-04-13

## 2022-04-13 ENCOUNTER — HOSPITAL ENCOUNTER (OUTPATIENT)
Facility: HOSPITAL | Age: 57
Setting detail: HOSPITAL OUTPATIENT SURGERY
Discharge: HOME | End: 2022-04-13
Attending: INTERNAL MEDICINE | Admitting: INTERNAL MEDICINE

## 2022-04-13 ENCOUNTER — ANESTHESIA (OUTPATIENT)
Dept: GASTROENTEROLOGY | Facility: HOSPITAL | Age: 57
End: 2022-04-13

## 2022-04-13 VITALS
DIASTOLIC BLOOD PRESSURE: 48 MMHG | TEMPERATURE: 97.6 F | HEART RATE: 97 BPM | SYSTOLIC BLOOD PRESSURE: 113 MMHG | BODY MASS INDEX: 25.83 KG/M2 | WEIGHT: 164.9 LBS | OXYGEN SATURATION: 95 % | RESPIRATION RATE: 18 BRPM

## 2022-04-13 DIAGNOSIS — R19.4 CHANGE IN BOWEL HABITS: ICD-10-CM

## 2022-04-13 LAB
ALBUMIN SERPL-MCNC: 4.4 G/DL (ref 3.5–5.2)
ALBUMIN/GLOB SERPL: 1.5 G/DL
ALP SERPL-CCNC: 56 U/L (ref 39–117)
ALT SERPL W P-5'-P-CCNC: 8 U/L (ref 1–33)
ANION GAP SERPL CALCULATED.3IONS-SCNC: 13.2 MMOL/L (ref 5–15)
AST SERPL-CCNC: 18 U/L (ref 1–32)
BILIRUB SERPL-MCNC: 0.2 MG/DL (ref 0–1.2)
BUN SERPL-MCNC: 12 MG/DL (ref 6–20)
BUN/CREAT SERPL: 21.8 (ref 7–25)
CALCIUM SPEC-SCNC: 9.4 MG/DL (ref 8.6–10.5)
CHLORIDE SERPL-SCNC: 98 MMOL/L (ref 98–107)
CK SERPL-CCNC: 19 U/L (ref 20–180)
CO2 SERPL-SCNC: 25.8 MMOL/L (ref 22–29)
CREAT SERPL-MCNC: 0.55 MG/DL (ref 0.57–1)
CRP SERPL-MCNC: 1.39 MG/DL (ref 0–0.5)
EGFRCR SERPLBLD CKD-EPI 2021: 107.7 ML/MIN/1.73
ERYTHROCYTE [SEDIMENTATION RATE] IN BLOOD: 12 MM/HR (ref 0–30)
GLOBULIN UR ELPH-MCNC: 3 GM/DL
GLUCOSE BLDC GLUCOMTR-MCNC: 135 MG/DL (ref 70–99)
GLUCOSE SERPL-MCNC: 181 MG/DL (ref 65–99)
POTASSIUM SERPL-SCNC: 4.1 MMOL/L (ref 3.5–5.2)
PROT SERPL-MCNC: 7.4 G/DL (ref 6–8.5)
SODIUM SERPL-SCNC: 137 MMOL/L (ref 136–145)

## 2022-04-13 PROCEDURE — 82962 GLUCOSE BLOOD TEST: CPT

## 2022-04-13 PROCEDURE — 45385 COLONOSCOPY W/LESION REMOVAL: CPT | Performed by: INTERNAL MEDICINE

## 2022-04-13 PROCEDURE — 25010000002 PROPOFOL 10 MG/ML EMULSION: Performed by: NURSE ANESTHETIST, CERTIFIED REGISTERED

## 2022-04-13 PROCEDURE — 25010000002 HEPARIN LOCK FLUSH PER 10 UNITS: Performed by: ANESTHESIOLOGY

## 2022-04-13 PROCEDURE — 88305 TISSUE EXAM BY PATHOLOGIST: CPT | Performed by: INTERNAL MEDICINE

## 2022-04-13 RX ORDER — SODIUM CHLORIDE, SODIUM LACTATE, POTASSIUM CHLORIDE, CALCIUM CHLORIDE 600; 310; 30; 20 MG/100ML; MG/100ML; MG/100ML; MG/100ML
30 INJECTION, SOLUTION INTRAVENOUS CONTINUOUS
Status: DISCONTINUED | OUTPATIENT
Start: 2022-04-13 | End: 2022-04-13 | Stop reason: HOSPADM

## 2022-04-13 RX ORDER — PHENYLEPHRINE HCL IN 0.9% NACL 1 MG/10 ML
SYRINGE (ML) INTRAVENOUS AS NEEDED
Status: DISCONTINUED | OUTPATIENT
Start: 2022-04-13 | End: 2022-04-13 | Stop reason: SURG

## 2022-04-13 RX ORDER — SODIUM CHLORIDE 0.9 % (FLUSH) 0.9 %
20 SYRINGE (ML) INJECTION AS NEEDED
Status: DISCONTINUED | OUTPATIENT
Start: 2022-04-13 | End: 2022-04-13 | Stop reason: HOSPADM

## 2022-04-13 RX ORDER — PROPOFOL 10 MG/ML
VIAL (ML) INTRAVENOUS AS NEEDED
Status: DISCONTINUED | OUTPATIENT
Start: 2022-04-13 | End: 2022-04-13 | Stop reason: SURG

## 2022-04-13 RX ORDER — SODIUM CHLORIDE 0.9 % (FLUSH) 0.9 %
10 SYRINGE (ML) INJECTION AS NEEDED
Status: DISCONTINUED | OUTPATIENT
Start: 2022-04-13 | End: 2022-04-13 | Stop reason: HOSPADM

## 2022-04-13 RX ORDER — LIDOCAINE HYDROCHLORIDE 20 MG/ML
INJECTION, SOLUTION INFILTRATION; PERINEURAL AS NEEDED
Status: DISCONTINUED | OUTPATIENT
Start: 2022-04-13 | End: 2022-04-13 | Stop reason: SURG

## 2022-04-13 RX ORDER — HEPARIN SODIUM (PORCINE) LOCK FLUSH IV SOLN 100 UNIT/ML 100 UNIT/ML
5 SOLUTION INTRAVENOUS AS NEEDED
Status: DISCONTINUED | OUTPATIENT
Start: 2022-04-13 | End: 2022-04-13 | Stop reason: HOSPADM

## 2022-04-13 RX ORDER — SODIUM CHLORIDE 0.9 % (FLUSH) 0.9 %
10 SYRINGE (ML) INJECTION EVERY 12 HOURS SCHEDULED
Status: DISCONTINUED | OUTPATIENT
Start: 2022-04-13 | End: 2022-04-13 | Stop reason: HOSPADM

## 2022-04-13 RX ORDER — MIDAZOLAM HYDROCHLORIDE 1 MG/ML
1 INJECTION INTRAMUSCULAR; INTRAVENOUS
Status: DISCONTINUED | OUTPATIENT
Start: 2022-04-13 | End: 2022-04-13

## 2022-04-13 RX ADMIN — Medication 100 MCG: at 12:51

## 2022-04-13 RX ADMIN — PROPOFOL 150 MCG/KG/MIN: 10 INJECTION, EMULSION INTRAVENOUS at 12:41

## 2022-04-13 RX ADMIN — Medication 200 MCG: at 13:07

## 2022-04-13 RX ADMIN — LIDOCAINE HYDROCHLORIDE 100 MG: 20 INJECTION, SOLUTION INFILTRATION; PERINEURAL at 12:41

## 2022-04-13 RX ADMIN — Medication 100 MCG: at 12:57

## 2022-04-13 RX ADMIN — PROPOFOL 50 MG: 10 INJECTION, EMULSION INTRAVENOUS at 12:41

## 2022-04-13 RX ADMIN — Medication 100 MCG: at 12:59

## 2022-04-13 RX ADMIN — HEPARIN SODIUM (PORCINE) LOCK FLUSH IV SOLN 100 UNIT/ML 500 UNITS: 100 SOLUTION at 13:32

## 2022-04-13 RX ADMIN — Medication 100 MCG: at 12:53

## 2022-04-13 RX ADMIN — SODIUM CHLORIDE, POTASSIUM CHLORIDE, SODIUM LACTATE AND CALCIUM CHLORIDE 30 ML/HR: 600; 310; 30; 20 INJECTION, SOLUTION INTRAVENOUS at 11:10

## 2022-04-13 NOTE — ANESTHESIA POSTPROCEDURE EVALUATION
Patient: Isha Llanes    Procedure Summary     Date: 04/13/22 Room / Location: [facility] ENDOSCOPY 4 / [facility] ENDOSCOPY    Anesthesia Start: 1239 Anesthesia Stop: 1313    Procedure: COLONOSCOPY WITH POLYPECTOMY (N/A ) Diagnosis:       Change in bowel habits      (Change in bowel habits [R19.4])    Surgeons: Karine Orlando MD Provider: Jose G Douglass MD    Anesthesia Type: general ASA Status: 3          Anesthesia Type: general    Vitals  Vitals Value Taken Time   /50 04/13/22 1313   Temp 36.4 °C (97.6 °F) 04/13/22 1308   Pulse 69 04/13/22 1314   Resp 18 04/13/22 1311   SpO2 97 % 04/13/22 1314   Vitals shown include unvalidated device data.        Post Anesthesia Care and Evaluation    Patient location during evaluation: bedside  Patient participation: complete - patient participated  Level of consciousness: awake  Pain score: 0  Pain management: adequate  Airway patency: patent  Anesthetic complications: No anesthetic complications  PONV Status: none  Cardiovascular status: acceptable and stable  Respiratory status: acceptable and room air  Hydration status: acceptable    Comments: An Anesthesiologist personally participated in the most demanding procedures (including induction and emergence if applicable) in the anesthesia plan, monitored the course of anesthesia administration at frequent intervals and remained physically present and available for immediate diagnosis and treatment of emergencies.

## 2022-04-13 NOTE — H&P
Pre Procedure History & Physical    Chief Complaint:   Diarrhea, change in bowel habits    Subjective     HPI:   57 yo F here for eval of diarrhea, change in bowel habits.    Past Medical History:   Past Medical History:   Diagnosis Date   • Anxiety     NO CURRENT MEDICATION   • Aortic stenosis, severe    • Arthritis    • Asthma    • Back pain    • Blood clotting disorder (HCC)    • Cancer associated pain    • Chronic low back pain with sciatica    • Diabetes mellitus (HCC)     TYPE 2   • Dyspnea on effort    • GERD (gastroesophageal reflux disease)    • H/O lumpectomy    • H/O: hysterectomy    • Hiatal hernia    • History of degenerative disc disease    • History of kidney stones    • History of pulmonary embolus (PE)    • History of transfusion    • Hodgkin's lymphoma (HCC)    • Immobility    • Lupus (HCC)    • Mitral valve prolapse    • Pelvic pain    • Peripheral neuropathy    • Personal history of DVT (deep vein thrombosis)    • Presence of intrathecal pump    • Sacroiliac joint disease    • SI (sacroiliac) joint inflammation (HCC)    • Venous insufficiency        Past Surgical History:  Past Surgical History:   Procedure Laterality Date   • BACK SURGERY      SPINAL FUSION    • BLADDER REPAIR     • CARDIAC CATHETERIZATION N/A 3/6/2019    Procedure: Valvuloplasty;  Surgeon: Wayne Johnson MD;  Location: [facility] INVASIVE LOCATION;  Service: Cardiology   • CARDIAC VALVE REPLACEMENT  2021   • CHOLECYSTECTOMY     • COLONOSCOPY N/A 12/29/2021    Procedure: COLONOSCOPY;  Surgeon: Karine Orlando MD;  Location: [facility] ENDOSCOPY;  Service: Gastroenterology;  Laterality: N/A;  POOR PREP   • ENDOSCOPY N/A 12/29/2021    Procedure: ESOPHAGOGASTRODUODENOSCOPY;  Surgeon: Karine Orlando MD;  Location: [facility] ENDOSCOPY;  Service: Gastroenterology;  Laterality: N/A;  ESOPHAGITIS, GASTRITIS, HIATAL HERNIA   • HYSTERECTOMY     • LYMPHADENECTOMY     • PACEMAKER IMPLANTATION     • PAIN PUMP  INSERTION/REVISION N/A 10/18/2019    Procedure: PAIN PUMP INSERTION [facility] [place] 10-18-19 [facility];  Surgeon: Horace Rios MD;  Location: [facility] MAIN OR;  Service: Pain Management   • PAIN PUMP INSERTION/REVISION N/A 11/23/2020    Procedure: pain pump removal;  Surgeon: Horace Rios MD;  Location: [facility] MAIN OR;  Service: Pain Management;  Laterality: N/A;   • TONSILLECTOMY     • TUBAL ABDOMINAL LIGATION     • TUMOR REMOVAL         Family History:  Family History   Problem Relation Age of Onset   • Pancreatic cancer Sister    • Pancreatic cancer Paternal Grandmother    • Malig Hyperthermia Neg Hx        Social History:   reports that she has been smoking cigarettes. She has a 82.00 pack-year smoking history. She has never used smokeless tobacco. She reports that she does not drink alcohol and does not use drugs.    Medications:   Facility-Administered Medications Prior to Admission   Medication Dose Route Frequency Provider Last Rate Last Admin   • sterile water irrigation solution 500 mL  500 mL Irrigation Once Damari Cornejo PA-C         Medications Prior to Admission   Medication Sig Dispense Refill Last Dose   • acetaminophen (TYLENOL) 500 MG tablet Take 1,000-1,500 mg by mouth Every 6 (Six) Hours As Needed for Mild Pain .   4/13/2022 at 1100   • B-D UF III MINI PEN NEEDLES 31G X 5 MM misc    4/12/2022 at Unknown time   • bacitracin 500 UNIT/GM ointment Apply 1 application topically to the appropriate area as directed 2 (Two) Times a Day. 453.9 g 1 4/13/2022 at 0700   • clopidogrel (Plavix) 75 MG tablet Take 1 tablet by mouth Daily. 90 tablet 0 Past Week at Unknown time   • DAPTOmycin (CUBICIN) 500 mg/100 mL NS mbp Infuse 500 mg into a venous catheter Daily. 14 dose 0 4/12/2022 at 1500   • Elastic Bandages & Supports (ACE Bandage Self-Adhering) misc 1 each Daily. 60 each 0 4/12/2022 at Unknown time   • famotidine (PEPCID) 20 MG tablet Take 1 tablet by mouth At Night As Needed for Heartburn. 90 tablet  1 2022 at Unknown time   • Flovent  MCG/ACT inhaler inhale 2 puffs by mouth twice daily (Patient taking differently: Inhale 1 puff 2 (Two) Times a Day.) 12 g 2    • [] fluconazole (Diflucan) 150 MG tablet Take 1 tablet by mouth 1 (One) Time for 1 dose. repeat in 3 days 2 tablet 0    • furosemide (LASIX) 40 MG tablet Take 1 tablet by mouth Daily. 90 tablet 1 2022 at Unknown time   • gabapentin (NEURONTIN) 300 MG capsule TAKE ONE CAPSULE BY MOUTH THREE TIMES DAILY (Patient taking differently: Take 300 mg by mouth 3 (Three) Times a Day.) 270 capsule 1 2022 at 1100   • HYDROmorphone (DILAUDID) 1 mg/mL solution Infuse  into a venous catheter Dose Adjusted By Provider As Needed. PER PAIN PUMP   2022 at Unknown time   • Insulin Lispro (ADMELOG SOLOSTAR) 100 UNIT/ML injection pen SLIDING SCALE R/T BS. Takes 2-12 units tid 2 pen 0 2022 at Unknown time   • ipratropium-albuterol (DUO-NEB) 0.5-2.5 mg/3 ml nebulizer Inhale 1 ampule.      • ProAir  (90 Base) MCG/ACT inhaler Inhale 2 puffs Every 4 (Four) Hours As Needed for Wheezing. 8.5 g 2 Past Week at Unknown time   • Probiotic, Lactobacillus, capsule Take 1 each by mouth 2 (Two) Times a Day. 30 capsule 0    • silver sulfadiazine (Silvadene) 1 % cream Apply 1 application topically to the appropriate area as directed Daily. 400 g 3    • sodium chloride 0.9 % solution with HYDROmorphone (PF) 50 MG/5ML solution 0.2 mg/mL Infuse 0-2 mg into a venous catheter Every 1 (One) Hour As Needed for Pain.   2022 at Unknown time   • triamcinolone (KENALOG) 0.1 % ointment Apply  topically to the appropriate area as directed 2 (Two) Times a Day. For one week for venous stasis dermatitis. Do not apply to open wounds (Patient taking differently: Apply 1 application topically to the appropriate area as directed 2 (Two) Times a Day. For one week for venous stasis dermatitis. Do not apply to open wounds) 15 g 0    • hydrOXYzine (ATARAX) 50 MG  tablet Take 50 mg by mouth Every 6 (Six) Hours As Needed.   More than a month at Unknown time       Allergies:  Amoxicillin, Ceclor [cefaclor], Penicillins, Sulfa antibiotics, Valium [diazepam], Ambien [zolpidem], Aspirin, Ativan [lorazepam], Benadryl [diphenhydramine], Biaxin [clarithromycin], Cefazolin, Cephalexin, Cephalosporins, Clindamycin, Compazine [prochlorperazine edisylate], Contrast dye, Doxycycline, Eggs or egg-derived products, Nsaids, Phenergan [promethazine hcl], Promethazine, and Vancomycin    ROS:    Pertinent items are noted in HPI     Objective     Blood pressure 104/75, pulse 93, temperature 97.3 °F (36.3 °C), temperature source Temporal, resp. rate 16, weight 74.8 kg (164 lb 14.5 oz), SpO2 93 %, not currently breastfeeding.    Physical Exam   Constitutional: Pt is oriented to person, place, and time and well-developed, well-nourished, and in no distress.   Mouth/Throat: Oropharynx is clear and moist.   Neck: Normal range of motion.   Cardiovascular: Normal rate, regular rhythm and normal heart sounds.    Pulmonary/Chest: Effort normal and breath sounds normal.   Abdominal: Soft. Nontender  Skin: Skin is warm and dry.   Psychiatric: Mood, memory, affect and judgment normal.     Assessment/Plan     Diagnosis:  Diarrhea, change in bowel habits    Anticipated Surgical Procedure:  Colonoscopy    The risks, benefits, and alternatives of this procedure have been discussed with the patient or the responsible party- the patient understands and agrees to proceed.

## 2022-04-13 NOTE — ANESTHESIA PREPROCEDURE EVALUATION
Anesthesia Evaluation     Patient summary reviewed and Nursing notes reviewed   no history of anesthetic complications:  NPO Solid Status: > 8 hours  NPO Liquid Status: > 2 hours           Airway   Mallampati: III  TM distance: >3 FB  Neck ROM: full  No difficulty expected  Dental      Pulmonary - normal exam    breath sounds clear to auscultation  (+) pulmonary embolism, COPD, asthma,shortness of breath,   Cardiovascular - normal exam  Exercise tolerance: good (4-7 METS)    Rhythm: regular  Rate: normal    (+) valvular problems/murmurs, CAD, CHF ,       Neuro/Psych- negative ROS  GI/Hepatic/Renal/Endo    (+)  hiatal hernia, GERD,  liver disease, renal disease, diabetes mellitus type 2,     Musculoskeletal     (+) back pain,   Abdominal    Substance History - negative use     OB/GYN negative ob/gyn ROS         Other   arthritis,    history of cancer           ABNORMAL ECG -  Sinus rhythm  Left bundle branch block  ST elevation secondary to IVCD    CONCLUSIONS:    1.  Normal left ventricular systolic function with an estimated ejection        fraction of 60-65%.  No regional wall motion abnormalities were observed.        Severe concentric LVH.  Grade 1 diastolic dysfunction with elevated left        ventricular filling pressure.    2.  Moderately dilated left atrium.    3.  Moderate to severe mitral annular calcification with thickened mitral        leaflets.  Mild to moderate mitral stenosis, as detailed above.  Mild        mitral regurgitation.    4.  Critical aortic stenosis, as detailed above.  Mild aortic regurgitation.    5.  Mild to moderate tricuspid regurgitation.  Estimated pulmonary arterial        systolic pressure was significantly elevated at 65 mmHg.                        Anesthesia Plan    ASA 3     general   (Patient understands anesthesia not responsible for dental damage.    Pt took gabapentin and tylenol this am)  intravenous induction     Anesthetic plan, all risks, benefits, and alternatives  have been provided, discussed and informed consent has been obtained with: patient.  Use of blood products discussed with patient .   Plan discussed with CRNA.        CODE STATUS:

## 2022-04-15 ENCOUNTER — PREP FOR SURGERY (OUTPATIENT)
Dept: OTHER | Facility: HOSPITAL | Age: 57
End: 2022-04-15

## 2022-04-15 ENCOUNTER — TELEPHONE (OUTPATIENT)
Dept: GASTROENTEROLOGY | Facility: CLINIC | Age: 57
End: 2022-04-15

## 2022-04-15 DIAGNOSIS — R19.4 CHANGE IN BOWEL HABITS: Primary | ICD-10-CM

## 2022-04-15 NOTE — TELEPHONE ENCOUNTER
----- Message from LUZ Rene sent at 4/14/2022  3:23 PM EDT -----  Please schedule for repeat colonoscopy in the next 3 months due to poor prep.  Needs 2 day extended bowel prep.

## 2022-04-15 NOTE — TELEPHONE ENCOUNTER
Spoke to pt and informed of Wanda ESCOBAR note. Pt agreed to schedule. Pt states that she did follow through with all the bowel prep. Educated patient on the importance to complete all of the 2 day bowel prep that was advised with the last colonoscopy. Pt verified understanding.  Case request entered for 2nd sign.

## 2022-04-22 ENCOUNTER — TELEPHONE (OUTPATIENT)
Dept: FAMILY MEDICINE CLINIC | Facility: CLINIC | Age: 57
End: 2022-04-22

## 2022-04-22 NOTE — TELEPHONE ENCOUNTER
Hub staff attempted to follow warm transfer process and was unsuccessful     Caller: Isha Llanes    Relationship to patient: Self    Best call back number: [phone]    Patient is needing: PATIENT REQUESTING TO SPEAK WITH Vidal Luevano APRN; STATES SHE HAS FINISHED THE IV ANTIBIOTICS, AND WOULD LIKE TO KNOW WHAT IS NEXT.  PATIENT REPORTS HAVING CELLULITIS ON HER LEGS AND TESTED POSITIVE FOR STAFF, BUT IT IS NOT CLEARED UP.    PLEASE ADVISE

## 2022-04-25 ENCOUNTER — TELEPHONE (OUTPATIENT)
Dept: GASTROENTEROLOGY | Facility: CLINIC | Age: 57
End: 2022-04-25

## 2022-04-25 NOTE — TELEPHONE ENCOUNTER
Blood Thinner/Cardiac Clearance                                                                                                                 [facility]    4/25/2022    Dear Vidal ESCOBAR,     Patient: Isha Llanes   YOB: 1965        This patient is waiting to have a Colonoscopy which I will perform at [facility] on 05/23/2022 date .     Our records indicate this patient is currently taking Plavix. This procedure requires the patient to suspend their anticoagulant medication prior to surgery.     Please respond to this request noting your recommendations. You may contact our office at [phone] Option 3 with any questions. I appreciate your prompt response in this matter.     Please return this form to our office as soon as possible to [phone]    __X__ I approve my patient to stop taking their Anticoagulant Therapy medication 5 days prior to the scheduled procedure.    ____ I do NOT approve my patient to stop taking their Anticoagulant Therapy medication at this time.    ____ I approve my patient from a cardiac standpoint.    ____ I do NOT approve my patient from a cardiac standpoint at this time.    Approving physician name (please print):     _____________________________________________    Approving physician signature:     ________________________________    Date:________________      Sincerely,  [facility]   Gastroenterology - Dr. Orlando

## 2022-04-26 NOTE — TELEPHONE ENCOUNTER
Called pt and informed of colonoscopy that is scheduled on 05/23/2022 and educated on the two day bowel prep. Pt states that the prep upset her stomach and makes her sick. I educated patient to start with earlier time in the day regarding oral intake of the bowel prep. Educated patient with regards to constipation and medical history the Rx prep was indicated for her.    Pt requested that I reach out to Provider on a different prep for colonoscopy with regards to symptoms that she experiences. Please advise.

## 2022-04-28 RX ORDER — POLYETHYLENE GLYCOL 3350, SODIUM SULFATE, SODIUM CHLORIDE, POTASSIUM CHLORIDE, ASCORBIC ACID, SODIUM ASCORBATE 140-9-5.2G
1 KIT ORAL TAKE AS DIRECTED
Qty: 1 EACH | Refills: 0 | Status: SHIPPED | OUTPATIENT
Start: 2022-04-28 | End: 2022-10-18

## 2022-04-28 RX ORDER — SODIUM PICOSULFATE, MAGNESIUM OXIDE, AND ANHYDROUS CITRIC ACID 10; 3.5; 12 MG/160ML; G/160ML; G/160ML
1 LIQUID ORAL TAKE AS DIRECTED
Qty: 320 ML | Refills: 0 | Status: SHIPPED | OUTPATIENT
Start: 2022-04-28 | End: 2022-10-18

## 2022-04-28 RX ORDER — POLYETHYLENE GLYCOL 3350 17 G/17G
17 POWDER, FOR SOLUTION ORAL DAILY
Qty: 527 G | Refills: 1 | Status: SHIPPED | OUTPATIENT
Start: 2022-04-28 | End: 2022-06-30

## 2022-04-29 ENCOUNTER — TELEPHONE (OUTPATIENT)
Dept: FAMILY MEDICINE CLINIC | Facility: CLINIC | Age: 57
End: 2022-04-29

## 2022-05-04 ENCOUNTER — OFFICE VISIT (OUTPATIENT)
Dept: FAMILY MEDICINE CLINIC | Facility: CLINIC | Age: 57
End: 2022-05-04

## 2022-05-04 VITALS
DIASTOLIC BLOOD PRESSURE: 60 MMHG | HEIGHT: 67 IN | TEMPERATURE: 93 F | WEIGHT: 114 LBS | SYSTOLIC BLOOD PRESSURE: 114 MMHG | BODY MASS INDEX: 17.89 KG/M2 | OXYGEN SATURATION: 93 % | HEART RATE: 97 BPM

## 2022-05-04 DIAGNOSIS — I83.022 VENOUS STASIS ULCER OF LEFT CALF LIMITED TO BREAKDOWN OF SKIN, UNSPECIFIED WHETHER VARICOSE VEINS PRESENT: Primary | ICD-10-CM

## 2022-05-04 DIAGNOSIS — I83.012 VENOUS STASIS ULCER OF RIGHT CALF LIMITED TO BREAKDOWN OF SKIN, UNSPECIFIED WHETHER VARICOSE VEINS PRESENT: ICD-10-CM

## 2022-05-04 DIAGNOSIS — F17.210 CIGARETTE NICOTINE DEPENDENCE WITHOUT COMPLICATION: ICD-10-CM

## 2022-05-04 DIAGNOSIS — L97.211 VENOUS STASIS ULCER OF RIGHT CALF LIMITED TO BREAKDOWN OF SKIN, UNSPECIFIED WHETHER VARICOSE VEINS PRESENT: ICD-10-CM

## 2022-05-04 DIAGNOSIS — Z79.4 TYPE 2 DIABETES MELLITUS WITH HYPERGLYCEMIA, WITH LONG-TERM CURRENT USE OF INSULIN: ICD-10-CM

## 2022-05-04 DIAGNOSIS — L97.221 VENOUS STASIS ULCER OF LEFT CALF LIMITED TO BREAKDOWN OF SKIN, UNSPECIFIED WHETHER VARICOSE VEINS PRESENT: Primary | ICD-10-CM

## 2022-05-04 DIAGNOSIS — E11.65 TYPE 2 DIABETES MELLITUS WITH HYPERGLYCEMIA, WITH LONG-TERM CURRENT USE OF INSULIN: ICD-10-CM

## 2022-05-04 PROCEDURE — 99214 OFFICE O/P EST MOD 30 MIN: CPT | Performed by: NURSE PRACTITIONER

## 2022-05-04 RX ORDER — GINSENG 100 MG
1 CAPSULE ORAL 2 TIMES DAILY
Qty: 453.9 G | Refills: 1 | Status: SHIPPED | OUTPATIENT
Start: 2022-05-04 | End: 2022-09-16 | Stop reason: SDUPTHER

## 2022-05-04 NOTE — PROGRESS NOTES
"Chief Complaint  Leg Swelling (Bilateral )    Subjective          Isha Llanes presents to [facility]  History of Present Illness  Patient presents today for a follow-up.  She has bilateral venous stasis with ulcers.  She completed her IV antibiotic daptomycin.  She states her ulcers have improved some.  She missed her follow-up appointment with wound care on 26 April due to no transportation.  Previous wound care noted for her to wash her legs daily with Dial soap and then apply Silvadene and then wrap her legs.  She states she has an allergy to Silvadene.    Continues to smoke 1 to 2 packs/day.    She states her blood sugars are elevated in the 200-300s.  Missed her appointment with  endocrinologist.    Social History     Socioeconomic History   • Marital status:    Tobacco Use   • Smoking status: Current Every Day Smoker     Packs/day: 2.00     Years: 41.00     Pack years: 82.00     Types: Cigarettes   • Smokeless tobacco: Never Used   Vaping Use   • Vaping Use: Never used   Substance and Sexual Activity   • Alcohol use: No   • Drug use: No   • Sexual activity: Defer       Objective   Vital Signs:   /60   Pulse 97   Temp 93 °F (33.9 °C)   Ht 170.2 cm (67\")   Wt 51.7 kg (114 lb)   SpO2 93%   BMI 17.85 kg/m²     Physical Exam  Vitals reviewed.   Constitutional:       Appearance: Normal appearance. She is well-developed.   HENT:      Head: Normocephalic and atraumatic.      Right Ear: External ear normal.      Left Ear: External ear normal.      Mouth/Throat:      Pharynx: No oropharyngeal exudate.   Eyes:      Conjunctiva/sclera: Conjunctivae normal.      Pupils: Pupils are equal, round, and reactive to light.   Cardiovascular:      Rate and Rhythm: Normal rate and regular rhythm.      Heart sounds: No murmur heard.    No friction rub. No gallop.   Pulmonary:      Effort: Pulmonary effort is normal.      Breath sounds: Normal breath sounds. No " wheezing or rhonchi.   Abdominal:      General: Bowel sounds are normal. There is no distension.      Palpations: Abdomen is soft.      Tenderness: There is no abdominal tenderness.   Skin:     General: Skin is warm and dry.      Findings: Wound present.      Comments: Multiple ulcerations on the right and left leg.  Ulceration on the right leg is excoriated from scratching   Neurological:      Mental Status: She is alert and oriented to person, place, and time.   Psychiatric:         Mood and Affect: Mood and affect normal.         Behavior: Behavior normal.         Thought Content: Thought content normal.         Judgment: Judgment normal.                Result Review :                 Assessment and Plan    Diagnoses and all orders for this visit:    1. Venous stasis ulcer of left calf limited to breakdown of skin, unspecified whether varicose veins present (HCC) (Primary)  -     bacitracin 500 UNIT/GM ointment; Apply 1 application topically to the appropriate area as directed 2 (Two) Times a Day.  Dispense: 453.9 g; Refill: 1    2. Venous stasis ulcer of right calf limited to breakdown of skin, unspecified whether varicose veins present (HCC)  -     bacitracin 500 UNIT/GM ointment; Apply 1 application topically to the appropriate area as directed 2 (Two) Times a Day.  Dispense: 453.9 g; Refill: 1    3. Cigarette nicotine dependence without complication    4. Type 2 diabetes mellitus with hyperglycemia, with long-term current use of insulin (HCC)    Since she is reports she is has an allergy to Silvadene will apply bacitracin ointment to ulcerations and wrap with Kerlix and Ace bandage.  Instructed patient she needs to follow-up with wound care.  Continue Lasix 40 mg daily.  Discussed smoking cessation.  Patient declines medications to assist with smoking cessation.  Patient has uncontrolled type 2 diabetes.  Needs to follow-up with endocrinologist.           Follow Up   Return in about 4 weeks (around 6/1/2022),  or if symptoms worsen or fail to improve, for Next scheduled follow up.  Patient was given instructions and counseling regarding her condition or for health maintenance advice. Please see specific information pulled into the AVS if appropriate.

## 2022-05-08 ENCOUNTER — HOSPITAL ENCOUNTER (EMERGENCY)
Facility: HOSPITAL | Age: 57
Discharge: LEFT BEFORE DISCHARGE | End: 2022-05-09

## 2022-05-08 LAB
ALBUMIN SERPL-MCNC: 4.5 G/DL (ref 3.5–5.2)
ALBUMIN/GLOB SERPL: 1.3 G/DL
ALP SERPL-CCNC: 98 U/L (ref 39–117)
ALT SERPL W P-5'-P-CCNC: 8 U/L (ref 1–33)
ANION GAP SERPL CALCULATED.3IONS-SCNC: 11.2 MMOL/L (ref 5–15)
AST SERPL-CCNC: 13 U/L (ref 1–32)
BASOPHILS # BLD AUTO: 0.01 10*3/MM3 (ref 0–0.2)
BASOPHILS NFR BLD AUTO: 0.2 % (ref 0–1.5)
BILIRUB SERPL-MCNC: 0.2 MG/DL (ref 0–1.2)
BUN SERPL-MCNC: 13 MG/DL (ref 6–20)
BUN/CREAT SERPL: 22 (ref 7–25)
CALCIUM SPEC-SCNC: 9.7 MG/DL (ref 8.6–10.5)
CHLORIDE SERPL-SCNC: 98 MMOL/L (ref 98–107)
CO2 SERPL-SCNC: 29.8 MMOL/L (ref 22–29)
CREAT SERPL-MCNC: 0.59 MG/DL (ref 0.57–1)
DEPRECATED RDW RBC AUTO: 44.9 FL (ref 37–54)
EGFRCR SERPLBLD CKD-EPI 2021: 105.9 ML/MIN/1.73
EOSINOPHIL # BLD AUTO: 0.11 10*3/MM3 (ref 0–0.4)
EOSINOPHIL NFR BLD AUTO: 2.1 % (ref 0.3–6.2)
ERYTHROCYTE [DISTWIDTH] IN BLOOD BY AUTOMATED COUNT: 14.6 % (ref 12.3–15.4)
GLOBULIN UR ELPH-MCNC: 3.5 GM/DL
GLUCOSE SERPL-MCNC: 135 MG/DL (ref 65–99)
HCT VFR BLD AUTO: 42.2 % (ref 34–46.6)
HGB BLD-MCNC: 13.2 G/DL (ref 12–15.9)
HOLD SPECIMEN: NORMAL
HOLD SPECIMEN: NORMAL
IMM GRANULOCYTES # BLD AUTO: 0.02 10*3/MM3 (ref 0–0.05)
IMM GRANULOCYTES NFR BLD AUTO: 0.4 % (ref 0–0.5)
LIPASE SERPL-CCNC: 24 U/L (ref 13–60)
LYMPHOCYTES # BLD AUTO: 1.1 10*3/MM3 (ref 0.7–3.1)
LYMPHOCYTES NFR BLD AUTO: 20.5 % (ref 19.6–45.3)
MCH RBC QN AUTO: 26.6 PG (ref 26.6–33)
MCHC RBC AUTO-ENTMCNC: 31.3 G/DL (ref 31.5–35.7)
MCV RBC AUTO: 84.9 FL (ref 79–97)
MONOCYTES # BLD AUTO: 0.49 10*3/MM3 (ref 0.1–0.9)
MONOCYTES NFR BLD AUTO: 9.1 % (ref 5–12)
NEUTROPHILS NFR BLD AUTO: 3.63 10*3/MM3 (ref 1.7–7)
NEUTROPHILS NFR BLD AUTO: 67.7 % (ref 42.7–76)
NRBC BLD AUTO-RTO: 0 /100 WBC (ref 0–0.2)
PLATELET # BLD AUTO: 169 10*3/MM3 (ref 140–450)
PMV BLD AUTO: 10.1 FL (ref 6–12)
POTASSIUM SERPL-SCNC: 4.2 MMOL/L (ref 3.5–5.2)
PROT SERPL-MCNC: 8 G/DL (ref 6–8.5)
RBC # BLD AUTO: 4.97 10*6/MM3 (ref 3.77–5.28)
SODIUM SERPL-SCNC: 139 MMOL/L (ref 136–145)
WBC NRBC COR # BLD: 5.36 10*3/MM3 (ref 3.4–10.8)
WHOLE BLOOD HOLD SPECIMEN: NORMAL
WHOLE BLOOD HOLD SPECIMEN: NORMAL

## 2022-05-08 PROCEDURE — 83690 ASSAY OF LIPASE: CPT

## 2022-05-08 PROCEDURE — 99211 OFF/OP EST MAY X REQ PHY/QHP: CPT

## 2022-05-08 PROCEDURE — 85025 COMPLETE CBC W/AUTO DIFF WBC: CPT

## 2022-05-08 PROCEDURE — 36415 COLL VENOUS BLD VENIPUNCTURE: CPT

## 2022-05-08 PROCEDURE — 80053 COMPREHEN METABOLIC PANEL: CPT

## 2022-05-08 RX ORDER — SODIUM CHLORIDE 0.9 % (FLUSH) 0.9 %
10 SYRINGE (ML) INJECTION AS NEEDED
Status: DISCONTINUED | OUTPATIENT
Start: 2022-05-08 | End: 2022-05-09 | Stop reason: HOSPADM

## 2022-05-09 ENCOUNTER — TELEPHONE (OUTPATIENT)
Dept: FAMILY MEDICINE CLINIC | Facility: CLINIC | Age: 57
End: 2022-05-09

## 2022-05-09 VITALS
HEIGHT: 66 IN | DIASTOLIC BLOOD PRESSURE: 71 MMHG | SYSTOLIC BLOOD PRESSURE: 115 MMHG | HEART RATE: 98 BPM | BODY MASS INDEX: 26.47 KG/M2 | WEIGHT: 164.68 LBS | RESPIRATION RATE: 16 BRPM | TEMPERATURE: 98.3 F | OXYGEN SATURATION: 95 %

## 2022-05-09 NOTE — TELEPHONE ENCOUNTER
Caller: Isha Llanes    Relationship: Self    Best call back number: [phone]    What medication are you requesting: ANTIBIOTIC FOR SORES AND SWELLING IN LEGS    What are your current symptoms: SORES AND SWELLING IN LEGS    How long have you been experiencing symptoms: ABOUT A WEEK    Have you had these symptoms before:    [x] Yes  [] No    Have you been treated for these symptoms before:   [x] Yes  [] No    If a prescription is needed, what is your preferred pharmacy and phone number: [facility] - [place], [place] - [address] [facility] [phone] [facility] [phone] FX     Additional notes: PATIENT STATED SHE'S BEEN SEEN FOR THIS AND RAN OUT OF THE ANTIBIOTIC. ITS FLARING UP AGAIN AND WOULD LIKE THE ANTIBIOTIC SENT IN AGAIN TO THE SAME PHARMACY WE SENT IT TO THE LAST TIME.

## 2022-05-10 ENCOUNTER — TELEPHONE (OUTPATIENT)
Dept: FAMILY MEDICINE CLINIC | Facility: CLINIC | Age: 57
End: 2022-05-10

## 2022-05-10 NOTE — TELEPHONE ENCOUNTER
Patient's daughter is calling requesting another round of antibiotic or culture or something. She is still sick. Please call and inform  [phone]

## 2022-05-10 NOTE — TELEPHONE ENCOUNTER
Spoke to patients daughter and she stated that her mother went to the ER and fell out of her wheelchair and nobody helped her so she asked for her daughter to come and pick her up and stated that she would just wait to see her PCP. Got patient scheduled for next available follow up and told patients daughter if she seems like she's getting worse and runs a fever to take her to a different ER. Patients daughter stated that she understood.

## 2022-05-10 NOTE — TELEPHONE ENCOUNTER
I would highly recommend for her to see wound care since she missed her last appt with them. Is she having fevers? If symptoms are worsening, may need to go to ER or Urgent Care. I'm full this week.

## 2022-05-10 NOTE — TELEPHONE ENCOUNTER
Missed warm transfer, patients daughter left a message to call her back at [phone] and phone was just ringing with no voicemail. Will attempt to call back later.

## 2022-05-10 NOTE — TELEPHONE ENCOUNTER
Called and left patient voicemail to call back in regards to previous message she left about antibiotic.HUB PLEASE WARM TRANSFER.

## 2022-05-10 NOTE — TELEPHONE ENCOUNTER
Left encounter message in previous encounter. Attempted to call daughter back on number provided below. No voicemail is set up , phone kept ringing. Will attempt to call later.

## 2022-05-16 ENCOUNTER — TELEPHONE (OUTPATIENT)
Dept: GASTROENTEROLOGY | Facility: CLINIC | Age: 57
End: 2022-05-16

## 2022-05-18 ENCOUNTER — OFFICE VISIT (OUTPATIENT)
Dept: FAMILY MEDICINE CLINIC | Facility: CLINIC | Age: 57
End: 2022-05-18

## 2022-05-18 VITALS
OXYGEN SATURATION: 95 % | BODY MASS INDEX: 26.36 KG/M2 | SYSTOLIC BLOOD PRESSURE: 112 MMHG | HEIGHT: 66 IN | TEMPERATURE: 97.6 F | DIASTOLIC BLOOD PRESSURE: 64 MMHG | WEIGHT: 164 LBS | HEART RATE: 92 BPM

## 2022-05-18 DIAGNOSIS — L97.221 VENOUS STASIS ULCER OF LEFT CALF LIMITED TO BREAKDOWN OF SKIN, UNSPECIFIED WHETHER VARICOSE VEINS PRESENT: ICD-10-CM

## 2022-05-18 DIAGNOSIS — L97.211 VENOUS STASIS ULCER OF RIGHT CALF LIMITED TO BREAKDOWN OF SKIN, UNSPECIFIED WHETHER VARICOSE VEINS PRESENT: ICD-10-CM

## 2022-05-18 DIAGNOSIS — F17.210 CIGARETTE NICOTINE DEPENDENCE WITHOUT COMPLICATION: ICD-10-CM

## 2022-05-18 DIAGNOSIS — R10.31 RIGHT LOWER QUADRANT PAIN: Primary | ICD-10-CM

## 2022-05-18 DIAGNOSIS — Z12.31 ENCOUNTER FOR SCREENING MAMMOGRAM FOR MALIGNANT NEOPLASM OF BREAST: ICD-10-CM

## 2022-05-18 DIAGNOSIS — I83.012 VENOUS STASIS ULCER OF RIGHT CALF LIMITED TO BREAKDOWN OF SKIN, UNSPECIFIED WHETHER VARICOSE VEINS PRESENT: ICD-10-CM

## 2022-05-18 DIAGNOSIS — I83.022 VENOUS STASIS ULCER OF LEFT CALF LIMITED TO BREAKDOWN OF SKIN, UNSPECIFIED WHETHER VARICOSE VEINS PRESENT: ICD-10-CM

## 2022-05-18 PROCEDURE — 99214 OFFICE O/P EST MOD 30 MIN: CPT | Performed by: NURSE PRACTITIONER

## 2022-05-18 NOTE — PROGRESS NOTES
Chief Complaint  Leg Swelling (Bilateral )    Subjective          Isha Llanes presents to [facility]   Presents today for a follow up on venous stasis ulcer of bilateral lower extremities. She missed her appt with wound care on 5/13/2022. She has missed several of her wound care appts.     She went to the ER on 5/8 stating she didn't feel well. They chitra blood in triage, but she left without being seen since she was in the waiting room for 4.5 hours.    Abdominal Pain  This is a new problem. The current episode started more than 1 month ago. The onset quality is gradual. The problem occurs constantly. The problem has been waxing and waning. The pain is located in the RLQ. The pain is at a severity of 9/10. The pain is severe. The quality of the pain is dull, aching and sharp. The abdominal pain radiates to the RUQ. Associated symptoms include arthralgias. Pertinent negatives include no constipation, diarrhea, dysuria, fever, hematuria, nausea or vomiting. The pain is aggravated by certain positions and movement. The pain is relieved by nothing. She has tried acetaminophen (pain pump) for the symptoms. The treatment provided no relief. Her past medical history is significant for abdominal surgery and GERD. There is no history of irritable bowel syndrome. hodgkins lymphoma     She is scheduled to see GI tomorrow and for a colonoscopy on 5/23 next Monday. She has an appt with wound care on 5/27.    Patient will dose off during conversation in office visit.  Patient does have a pain pump and is taking gabapentin 300 mg 4 times a day.    Social History     Socioeconomic History   • Marital status:    Tobacco Use   • Smoking status: Current Every Day Smoker     Packs/day: 2.00     Years: 41.00     Pack years: 82.00     Types: Cigarettes   • Smokeless tobacco: Never Used   Vaping Use   • Vaping Use: Never used   Substance and Sexual Activity   • Alcohol use: No   • Drug  "use: No   • Sexual activity: Defer         Objective   Vital Signs:  /64   Pulse 92   Temp 97.6 °F (36.4 °C)   Ht 167.6 cm (66\")   Wt 74.4 kg (164 lb)   SpO2 95%   BMI 26.47 kg/m²         Physical Exam  Vitals reviewed.   Constitutional:       General: She is sleeping.      Appearance: Normal appearance.   HENT:      Head: Normocephalic and atraumatic.      Right Ear: External ear normal.      Left Ear: External ear normal.      Mouth/Throat:      Pharynx: No oropharyngeal exudate.   Eyes:      Conjunctiva/sclera: Conjunctivae normal.      Pupils: Pupils are equal, round, and reactive to light.   Cardiovascular:      Rate and Rhythm: Normal rate and regular rhythm.      Heart sounds: No murmur heard.    No friction rub. No gallop.   Pulmonary:      Effort: Pulmonary effort is normal.      Breath sounds: Normal breath sounds. No wheezing or rhonchi.   Abdominal:      General: Bowel sounds are normal. There is no distension.      Palpations: Abdomen is soft.      Tenderness: There is abdominal tenderness in the right lower quadrant.   Skin:     General: Skin is warm and dry.      Findings: Erythema and wound present.      Comments: Bilateral lower extremity swelling with erythema and ulcerations.  More ulcerations on the left leg.  Several are excoriated   Neurological:      Mental Status: She is oriented to person, place, and time.   Psychiatric:         Mood and Affect: Mood and affect normal.         Behavior: Behavior normal.         Thought Content: Thought content normal.         Judgment: Judgment normal.        Result Review :                 Assessment and Plan    Diagnoses and all orders for this visit:    1. Right lower quadrant pain (Primary)  -     CT Abdomen Pelvis Without Contrast; Future    2. Cigarette nicotine dependence without complication  -      CT Chest Low Dose Cancer Screening WO; Future    3. Encounter for screening mammogram for malignant neoplasm of breast  -     Mammo Screening " Digital Tomosynthesis Bilateral With CAD    4. Venous stasis ulcer of left calf limited to breakdown of skin, unspecified whether varicose veins present (HCC)    5. Venous stasis ulcer of right calf limited to breakdown of skin, unspecified whether varicose veins present (HCC)    Patient will see wound care next week.  We will apply bacitracin with Kerlix and Ace wrap today in office.  Discussed elevating legs while at home.  Patient follow-up with gastroenterology tomorrow.  Patient also seeing wound care next week.  Discussed wound care with patient.         Follow Up   Return in about 4 weeks (around 6/15/2022), or if symptoms worsen or fail to improve, for Next scheduled follow up.  Patient was given instructions and counseling regarding her condition or for health maintenance advice. Please see specific information pulled into the AVS if appropriate.

## 2022-05-23 NOTE — TELEPHONE ENCOUNTER
Attempted to notify patient of missed appt and reschedule. Left a message requesting a return call. Certified letter mailed.

## 2022-05-24 ENCOUNTER — TELEPHONE (OUTPATIENT)
Dept: GASTROENTEROLOGY | Facility: CLINIC | Age: 57
End: 2022-05-24

## 2022-05-24 NOTE — TELEPHONE ENCOUNTER
Patient stated that she missed her procedure. Patient would like to reschedule. Patient requested that appointment information be left on her voicemail. Patient was scheduled but pending due to blood thinner.

## 2022-05-24 NOTE — TELEPHONE ENCOUNTER
Blood Thinner/Cardiac Clearance                                                                                                                 [facility]    5/24/2022    Dear LUZ Nam,     Patient: Isha Llanes   YOB: 1965        This patient is waiting to have a Colonoscopy and/or Esophagogastroduodenoscopy which I will perform at [facility] on 9/30/2022.     Our records indicate this patient is currently taking Plavix. This procedure requires the patient to suspend their anticoagulant medication prior to surgery.     Please respond to this request noting your recommendations. You may contact our office at [phone] Option 3 with any questions. I appreciate your prompt response in this matter.     Please return this form to our office as soon as possible to Carlene GUTIERREZ    ____ I approve my patient to stop taking their Anticoagulant Therapy medication 5 days prior to the scheduled procedure.    ____ I do NOT approve my patient to stop taking their Anticoagulant Therapy medication at this time.    ____ I approve my patient from a cardiac standpoint.    ____ I do NOT approve my patient from a cardiac standpoint at this time.    Approving physician name (please print):     _____________________________________________    Approving physician signature:     ________________________________    Date:________________      Sincerely,  [facility]   Gastroenterology - Dr. Orlando

## 2022-05-25 NOTE — TELEPHONE ENCOUNTER
This was printed out, signed by Vidal Luevano, and scanned into patient's chart. If you would like it faxed, please respond with your fax number.

## 2022-05-27 ENCOUNTER — TELEPHONE (OUTPATIENT)
Dept: FAMILY MEDICINE CLINIC | Facility: CLINIC | Age: 57
End: 2022-05-27

## 2022-05-27 NOTE — TELEPHONE ENCOUNTER
Caller: Isha Llanes    Relationship: Self    Best call back number: [phone] OKAY TO     What medication are you requesting: ANTIBIOTICS    What are your current symptoms: SORES ON LEGS        Have you had these symptoms before:    [x] Yes  [] No    Have you been treated for these symptoms before:   [x] Yes  [] No    If a prescription is needed, what is your preferred pharmacy and phone number: [facility] - [place], [place] - [address] [facility] [phone] [facility] [phone] FX     Additional notes:PATIENT SAID THAT SHE IS HAVING ISSUES WITH SORES ON HER LEGS AGAIN (MORE ON HER LEFT THAN RIGHT) AND SAID WOUND CARE IS COMING ON THE 10TH. SHE WOULD LIKE A CALL BACK PLEASE WITH NEXT BEST STEPS.

## 2022-05-27 NOTE — TELEPHONE ENCOUNTER
She canceled her appointment with wound care today if she is having all these problems with her legs why she missing her appointments with wound care.

## 2022-06-02 ENCOUNTER — TELEPHONE (OUTPATIENT)
Dept: FAMILY MEDICINE CLINIC | Facility: CLINIC | Age: 57
End: 2022-06-02

## 2022-06-02 NOTE — TELEPHONE ENCOUNTER
Please return patient's call. She said her voice mail is not working right and she has missed a couple of calls from us. [phone]

## 2022-06-06 ENCOUNTER — TELEPHONE (OUTPATIENT)
Dept: FAMILY MEDICINE CLINIC | Facility: CLINIC | Age: 57
End: 2022-06-06

## 2022-06-06 NOTE — TELEPHONE ENCOUNTER
Caller: Isha Llanes    Relationship: Self    Best call back number: [phone]     Requested Prescriptions:     LANCETS FOR FREE STYLE       Pharmacy where request should be sent: [facility] - [place], [place] - [address] [facility] - [phone]  - [phone] FX     Additional details provided by patient:     Does the patient have less than a 3 day supply:  [x] Yes  [] No    Maximilian SPEAR Rep   06/06/22 13:18 EDT

## 2022-06-06 NOTE — TELEPHONE ENCOUNTER
Caller: Isha Llanes    Relationship: Self    Best call back number: [phone]     Who is your current provider: ALINE MATTHEWS    Who would you like your new provider to be: DANIEL GALE    What are your reasons for transferring care: PATIENT AND EVARISTO IS NOT SEEING EYE TO EYE    Additional notes: WOULD LIKE AN APPOINTMENT ASAP AND MORNING VISIT

## 2022-06-07 RX ORDER — LANCETS 28 GAUGE
EACH MISCELLANEOUS
Qty: 100 EACH | Refills: 0 | Status: SHIPPED | OUTPATIENT
Start: 2022-06-07

## 2022-06-07 NOTE — TELEPHONE ENCOUNTER
I dont think this would be a good switch. She already called patient advocate after our first visit. If she is unhappy, then I would recommend another practice.

## 2022-06-07 NOTE — TELEPHONE ENCOUNTER
See note from Kiko below. Request has been denied. Patient will need to either stay with [facility] or find another office to provide her care.

## 2022-06-07 NOTE — TELEPHONE ENCOUNTER
Successful contact with pt. Relayed to the pt that the provider did not agree to conduct tranfers of care. Pt stated that she has had the most success with her wound since seeing [facility] than any other service in the last 2 yrs. Pt was informed that [facility] an will continue care for her and that she would need to set up another rebecca to continue care for her wounds

## 2022-06-08 ENCOUNTER — REFERRAL TRIAGE (OUTPATIENT)
Dept: CASE MANAGEMENT | Facility: OTHER | Age: 57
End: 2022-06-08

## 2022-06-08 DIAGNOSIS — L97.229 VENOUS STASIS ULCER OF LEFT CALF, UNSPECIFIED ULCER STAGE, UNSPECIFIED WHETHER VARICOSE VEINS PRESENT: ICD-10-CM

## 2022-06-08 DIAGNOSIS — I83.022 VENOUS STASIS ULCER OF LEFT CALF LIMITED TO BREAKDOWN OF SKIN, UNSPECIFIED WHETHER VARICOSE VEINS PRESENT: Primary | ICD-10-CM

## 2022-06-08 DIAGNOSIS — L97.219 VENOUS STASIS ULCER OF RIGHT CALF, UNSPECIFIED ULCER STAGE, UNSPECIFIED WHETHER VARICOSE VEINS PRESENT: Primary | ICD-10-CM

## 2022-06-08 DIAGNOSIS — S81.809D NON-HEALING WOUND OF LOWER EXTREMITY, SUBSEQUENT ENCOUNTER: ICD-10-CM

## 2022-06-08 DIAGNOSIS — Z79.4 TYPE 2 DIABETES MELLITUS WITH HYPERGLYCEMIA, WITH LONG-TERM CURRENT USE OF INSULIN: ICD-10-CM

## 2022-06-08 DIAGNOSIS — Z91.199 NONCOMPLIANCE: ICD-10-CM

## 2022-06-08 DIAGNOSIS — E11.65 TYPE 2 DIABETES MELLITUS WITH HYPERGLYCEMIA, WITH LONG-TERM CURRENT USE OF INSULIN: ICD-10-CM

## 2022-06-08 DIAGNOSIS — I83.012 VENOUS STASIS ULCER OF RIGHT CALF, UNSPECIFIED ULCER STAGE, UNSPECIFIED WHETHER VARICOSE VEINS PRESENT: Primary | ICD-10-CM

## 2022-06-08 DIAGNOSIS — L97.211 VENOUS STASIS ULCER OF RIGHT CALF LIMITED TO BREAKDOWN OF SKIN, UNSPECIFIED WHETHER VARICOSE VEINS PRESENT: ICD-10-CM

## 2022-06-08 DIAGNOSIS — L97.221 VENOUS STASIS ULCER OF LEFT CALF LIMITED TO BREAKDOWN OF SKIN, UNSPECIFIED WHETHER VARICOSE VEINS PRESENT: Primary | ICD-10-CM

## 2022-06-08 DIAGNOSIS — I83.022 VENOUS STASIS ULCER OF LEFT CALF, UNSPECIFIED ULCER STAGE, UNSPECIFIED WHETHER VARICOSE VEINS PRESENT: ICD-10-CM

## 2022-06-08 DIAGNOSIS — I87.303 VENOUS HYPERTENSION OF BOTH LOWER EXTREMITIES: ICD-10-CM

## 2022-06-08 DIAGNOSIS — I83.012 VENOUS STASIS ULCER OF RIGHT CALF LIMITED TO BREAKDOWN OF SKIN, UNSPECIFIED WHETHER VARICOSE VEINS PRESENT: ICD-10-CM

## 2022-06-13 ENCOUNTER — TELEPHONE (OUTPATIENT)
Dept: CASE MANAGEMENT | Facility: OTHER | Age: 57
End: 2022-06-13

## 2022-06-13 DIAGNOSIS — Z79.4 TYPE 2 DIABETES MELLITUS WITH HYPERGLYCEMIA, WITH LONG-TERM CURRENT USE OF INSULIN: ICD-10-CM

## 2022-06-13 DIAGNOSIS — E11.65 TYPE 2 DIABETES MELLITUS WITH HYPERGLYCEMIA, WITH LONG-TERM CURRENT USE OF INSULIN: ICD-10-CM

## 2022-06-13 DIAGNOSIS — S81.809D NON-HEALING WOUND OF LOWER EXTREMITY, SUBSEQUENT ENCOUNTER: ICD-10-CM

## 2022-06-13 DIAGNOSIS — Z91.199 NONCOMPLIANCE: Primary | ICD-10-CM

## 2022-06-17 ENCOUNTER — APPOINTMENT (OUTPATIENT)
Dept: CT IMAGING | Facility: HOSPITAL | Age: 57
End: 2022-06-17

## 2022-06-17 ENCOUNTER — PATIENT OUTREACH (OUTPATIENT)
Dept: CASE MANAGEMENT | Facility: OTHER | Age: 57
End: 2022-06-17

## 2022-06-17 ENCOUNTER — TELEPHONE (OUTPATIENT)
Dept: CASE MANAGEMENT | Facility: OTHER | Age: 57
End: 2022-06-17

## 2022-06-17 DIAGNOSIS — E11.65 TYPE 2 DIABETES MELLITUS WITH HYPERGLYCEMIA, WITH LONG-TERM CURRENT USE OF INSULIN: ICD-10-CM

## 2022-06-17 DIAGNOSIS — L97.219 VENOUS STASIS ULCER OF RIGHT CALF, UNSPECIFIED ULCER STAGE, UNSPECIFIED WHETHER VARICOSE VEINS PRESENT: ICD-10-CM

## 2022-06-17 DIAGNOSIS — L97.229 VENOUS STASIS ULCER OF LEFT CALF, UNSPECIFIED ULCER STAGE, UNSPECIFIED WHETHER VARICOSE VEINS PRESENT: ICD-10-CM

## 2022-06-17 DIAGNOSIS — S81.809D NON-HEALING WOUND OF LOWER EXTREMITY, SUBSEQUENT ENCOUNTER: Primary | ICD-10-CM

## 2022-06-17 DIAGNOSIS — I83.012 VENOUS STASIS ULCER OF RIGHT CALF, UNSPECIFIED ULCER STAGE, UNSPECIFIED WHETHER VARICOSE VEINS PRESENT: ICD-10-CM

## 2022-06-17 DIAGNOSIS — Z79.4 TYPE 2 DIABETES MELLITUS WITH HYPERGLYCEMIA, WITH LONG-TERM CURRENT USE OF INSULIN: ICD-10-CM

## 2022-06-17 DIAGNOSIS — I83.022 VENOUS STASIS ULCER OF LEFT CALF, UNSPECIFIED ULCER STAGE, UNSPECIFIED WHETHER VARICOSE VEINS PRESENT: ICD-10-CM

## 2022-06-20 ENCOUNTER — TELEPHONE (OUTPATIENT)
Dept: WOUND CARE | Facility: HOSPITAL | Age: 57
End: 2022-06-20

## 2022-06-20 ENCOUNTER — TELEPHONE (OUTPATIENT)
Dept: CASE MANAGEMENT | Facility: OTHER | Age: 57
End: 2022-06-20

## 2022-06-20 DIAGNOSIS — E11.65 TYPE 2 DIABETES MELLITUS WITH HYPERGLYCEMIA, WITH LONG-TERM CURRENT USE OF INSULIN: Primary | ICD-10-CM

## 2022-06-20 DIAGNOSIS — Z79.4 TYPE 2 DIABETES MELLITUS WITH HYPERGLYCEMIA, WITH LONG-TERM CURRENT USE OF INSULIN: Primary | ICD-10-CM

## 2022-06-20 NOTE — TELEPHONE ENCOUNTER
Called pt again, no answer. No voicemail.  Called alternative number/contact and spoke to step-mom who also did not have a good number for pt. I asked that she relay a message, if able, just reminding pt of upcoming appt, she agreed.

## 2022-06-20 NOTE — TELEPHONE ENCOUNTER
Patient left voice mail needing to make appointment, I have tried calling patient, but phone number will not receive calls at this time.

## 2022-06-21 ENCOUNTER — TELEPHONE (OUTPATIENT)
Dept: CASE MANAGEMENT | Facility: OTHER | Age: 57
End: 2022-06-21

## 2022-06-21 DIAGNOSIS — Z79.4 TYPE 2 DIABETES MELLITUS WITH HYPERGLYCEMIA, WITH LONG-TERM CURRENT USE OF INSULIN: Primary | ICD-10-CM

## 2022-06-21 DIAGNOSIS — E11.65 TYPE 2 DIABETES MELLITUS WITH HYPERGLYCEMIA, WITH LONG-TERM CURRENT USE OF INSULIN: Primary | ICD-10-CM

## 2022-06-23 ENCOUNTER — TELEPHONE (OUTPATIENT)
Dept: CASE MANAGEMENT | Facility: OTHER | Age: 57
End: 2022-06-23

## 2022-06-24 ENCOUNTER — PATIENT OUTREACH (OUTPATIENT)
Dept: CASE MANAGEMENT | Facility: OTHER | Age: 57
End: 2022-06-24

## 2022-06-24 ENCOUNTER — TELEPHONE (OUTPATIENT)
Dept: CASE MANAGEMENT | Facility: OTHER | Age: 57
End: 2022-06-24

## 2022-06-24 DIAGNOSIS — Z79.4 TYPE 2 DIABETES MELLITUS WITH HYPERGLYCEMIA, WITH LONG-TERM CURRENT USE OF INSULIN: Primary | ICD-10-CM

## 2022-06-24 DIAGNOSIS — E11.65 TYPE 2 DIABETES MELLITUS WITH HYPERGLYCEMIA, WITH LONG-TERM CURRENT USE OF INSULIN: Primary | ICD-10-CM

## 2022-06-24 NOTE — OUTREACH NOTE
Patient Outreach    MSW has attempted x4 to reach patient. UTR as phone is not working. MSW available if needed in the future.     ANA M RUIZ -   Ambulatory Case Management    6/24/2022, 10:39 EDT   Normal rate, regular rhythm.  Heart sounds S1, S2.  No murmurs, rubs or gallops.

## 2022-06-24 NOTE — TELEPHONE ENCOUNTER
Called to remind pt of appt today and ensure she had a ride. Phone number not working, she only has one number listed for her

## 2022-06-24 NOTE — OUTREACH NOTE
AMBULATORY CASE MANAGEMENT NOTE    Name and Relationship of Patient/Support Person: Isha Llanes M - Self    Pt could not commit to appt because she could not guarantee she would have transportation. She said that either at 0900/0930 or 4 pm appt would work. I will discuss next week with office and pcp.    Her HH was denied from [facility], [facility] and [facility] due to insurance., I agreed to try one more, [facility]. Faxed over referral.    [facility] Interim Update    6/27: [facility] denied due to insurance. I reached back out to Hailey with [facility] and she is going to talk to manager about accepting pt.        CHARLOTTE GOODMAN  Ambulatory Case Management    6/24/2022, 14:20 EDT

## 2022-06-27 ENCOUNTER — PATIENT OUTREACH (OUTPATIENT)
Dept: CASE MANAGEMENT | Facility: OTHER | Age: 57
End: 2022-06-27

## 2022-06-27 DIAGNOSIS — E11.65 TYPE 2 DIABETES MELLITUS WITH HYPERGLYCEMIA, WITH LONG-TERM CURRENT USE OF INSULIN: Primary | ICD-10-CM

## 2022-06-27 DIAGNOSIS — Z79.4 TYPE 2 DIABETES MELLITUS WITH HYPERGLYCEMIA, WITH LONG-TERM CURRENT USE OF INSULIN: Primary | ICD-10-CM

## 2022-06-27 DIAGNOSIS — S81.809D NON-HEALING WOUND OF LOWER EXTREMITY, SUBSEQUENT ENCOUNTER: ICD-10-CM

## 2022-06-27 NOTE — OUTREACH NOTE
AMBULATORY CASE MANAGEMENT NOTE    Name and Relationship of Patient/Support Person: Isha Llanes M - Self    Pt called from a friend's phone. [facility] pcp appt for 7/1.  She said her lower extremities, from knee down, bilat., had decreased hot/cold sensation. She has neuropathy. I asked her to touch her legs with her hands to see if they felt cooler. Her legs are wrapped currently but she agreed to check when she got home. I advised pt to go to ER if s/s worsened, she had a foreign body in foot, or she had darkening or discoloration of legs. She agreed.    [facility] Interim Update    6/28: Spoke to Devendra with [facility]  who agreed to see pt. I told her that pt has no phone. They agreed to see her on Tuesday next week, no phone needed, will do a drive by visit and just show up on Tuesday. Let PCP know.        CHARLOTTE GOODMAN  Ambulatory Case Management    6/27/2022, 14:19 EDT

## 2022-06-29 ENCOUNTER — PATIENT OUTREACH (OUTPATIENT)
Dept: CASE MANAGEMENT | Facility: OTHER | Age: 57
End: 2022-06-29

## 2022-06-29 DIAGNOSIS — S81.809D NON-HEALING WOUND OF LOWER EXTREMITY, SUBSEQUENT ENCOUNTER: ICD-10-CM

## 2022-06-29 DIAGNOSIS — E11.65 TYPE 2 DIABETES MELLITUS WITH HYPERGLYCEMIA, WITH LONG-TERM CURRENT USE OF INSULIN: Primary | ICD-10-CM

## 2022-06-29 DIAGNOSIS — I83.022 VENOUS STASIS ULCER OF LEFT CALF, UNSPECIFIED ULCER STAGE, UNSPECIFIED WHETHER VARICOSE VEINS PRESENT: ICD-10-CM

## 2022-06-29 DIAGNOSIS — Z91.199 NONCOMPLIANCE: ICD-10-CM

## 2022-06-29 DIAGNOSIS — J44.9 CHRONIC OBSTRUCTIVE PULMONARY DISEASE, UNSPECIFIED COPD TYPE: ICD-10-CM

## 2022-06-29 DIAGNOSIS — L97.219 VENOUS STASIS ULCER OF RIGHT CALF, UNSPECIFIED ULCER STAGE, UNSPECIFIED WHETHER VARICOSE VEINS PRESENT: ICD-10-CM

## 2022-06-29 DIAGNOSIS — I83.012 VENOUS STASIS ULCER OF RIGHT CALF, UNSPECIFIED ULCER STAGE, UNSPECIFIED WHETHER VARICOSE VEINS PRESENT: ICD-10-CM

## 2022-06-29 DIAGNOSIS — L97.229 VENOUS STASIS ULCER OF LEFT CALF, UNSPECIFIED ULCER STAGE, UNSPECIFIED WHETHER VARICOSE VEINS PRESENT: ICD-10-CM

## 2022-06-29 DIAGNOSIS — Z79.4 TYPE 2 DIABETES MELLITUS WITH HYPERGLYCEMIA, WITH LONG-TERM CURRENT USE OF INSULIN: Primary | ICD-10-CM

## 2022-06-29 PROCEDURE — 99487 CPLX CHRNC CARE 1ST 60 MIN: CPT | Performed by: NURSE PRACTITIONER

## 2022-06-29 NOTE — OUTREACH NOTE
[facility] End of Month Documentation    This Chronic Medical Management Care Plan for Isha Llanes, 56 y.o. female, has been monitored and managed; established; a new plan of care implemented and a new plan of care implemented for the month of June.  A cumulative time of 61  minutes was spent on this patient record this month, including phone call with patient; chart review; electronic communication with other providers; electronic communication with primary care provider.    Regarding the patient's problems: has Cancer associated pain; Presence of intrathecal pump; Chronic low back pain with bilateral sciatica; Sacroiliac inflammation (HCC); Chronic pain syndrome; Pelvic pain; Aortic stenosis, severe; Dyspnea on effort; Other pulmonary embolism without acute cor pulmonale (HCC); Nonrheumatic aortic valve stenosis; Hip pain; Anxiety disorder; Allergic rhinitis due to allergen; Arthritis; Asthma; Kidney disease; CHF (congestive heart failure) (HCC); Chronic obstructive pulmonary disease (HCC); Diabetes mellitus, type 2 (HCC); Depression; GERD (gastroesophageal reflux disease); S/P TAVR (transcatheter aortic valve replacement); Limited mobility; Venous hypertension of both lower extremities; Nicotine dependence; Mitral valve prolapse; Lupus (systemic lupus erythematosus) (HCC); Long term current use of anticoagulant therapy; History of Hodgkin's lymphoma; Hepatic steatosis; Heart murmur; CAD (coronary artery disease); Non-healing wound of lower extremity, subsequent encounter; MSSA (methicillin susceptible Staphylococcus aureus) infection; Sepsis (HCC); Change in bowel habits; Nausea; Venous stasis ulcer of right calf (HCC); Venous stasis ulcer of left calf (HCC); and Noncompliance on their problem list., the following items were addressed: medical records; medications; referrals to community service providers,  referral and pt wants home health and any changes can be found within the plan section of the note.  A  detailed listing of time spent for chronic care management is tracked within each outreach encounter.  Current medications include:  has a current medication list which includes the following prescription(s): acetaminophen, b-d uf iii mini pen needles, bacitracin, bacitracin, clopidogrel, daptomycin, ace bandage self-adhering, epinephrine, famotidine, flovent hfa, fluconazole, freestyle lite, furosemide, gabapentin, hydromorphone, hydroxyzine, insulin lispro, ipratropium-albuterol, freestyle, lantus solostar, pantoprazole, plenvu, proair hfa, probiotic (lactobacillus), silver sulfadiazine, clenpiq, sodium chloride 0.9 % solution with HYDROmorphone (PF) 50 MG/5ML solution 0.2 mg/mL, sodium chloride, and triamcinolone, and the following Facility-Administered Medications: sterile water. and the patient is reported to be patient is compliant with medication protocol,  Medications are reported to be non-effective in controlling symptoms and changes have been made to the medication protocol.  Regarding these diagnoses, referrals were made to the following provider(s):  Social work.  All notes on chart for PCP to review.    The patient was monitored remotely for activity level; mood & behavior; pain; medications.    The patient's physical needs include:  help taking medications as prescribed; needs assistance with ADLs; physical healthcare; physician referral.     The patient's mental support needs include:  not applicable    The patient's cognitive support needs include:  not applicable    The patient's psychosocial support needs include:  need for increased support    The patient's functional needs include: physical healthcare; physician referral    The patient's environmental needs include:  no access to transportation,  referred    Care Plan overall comments:  No data recorded    Refer to previous outreach notes for more information on the areas listed above.    Monthly Billing Diagnoses  (E11.65,  Z79.4) Type 2  diabetes mellitus with hyperglycemia, with long-term current use of insulin (HCC)    (S81.809D) Non-healing wound of lower extremity, subsequent encounter    (I83.012,  L97.219) Venous stasis ulcer of right calf, unspecified ulcer stage, unspecified whether varicose veins present (HCC)    (I83.022,  L97.229) Venous stasis ulcer of left calf, unspecified ulcer stage, unspecified whether varicose veins present (HCC)    (Z91.19) Noncompliance    Medications   · Medications have been reconciled    Care Plan progress this month:      Recently Modified Care Plans Updates made since 5/29/2022 12:00 AM     General Plan of Care (Adult)         Problem Priority Last Modified     ELS HEALTH PROMOTION OR DISEASE SELF-MANAGEMENT (GENERAL PLAN OF CARE) (ADULT) --  6/17/2022  3:35 PM by Yoli Dao RN              Goal Recent Progress Last Modified     Self-Management Plan Developed --  6/17/2022  3:35 PM by Yoli Dao RN     Evidence-based guidance:   Review biopsychosocial determinants of health screens.   Determine level of modifiable health risk.   Assess level of patient activation, level of readiness, importance and confidence to make changes.   Evoke change talk using open-ended questions, pros and cons, as well as looking forward.   Identify areas where behavior change may lead to improved health.   Partner with patient to develop a robust self-management plan that includes lifestyle factors, such as weight loss, exercise and healthy nutrition, as well as goals specific to disease risks.   Support patient and family/caregiver active participation in decision-making and self-management plan.   Implement additional goals and interventions based on identified risk factors to reduce health risk.   Facilitate advance care planning.   Review need for preventive screening based on age, sex, family history and health history.    Notes:              Task Due Date Last Modified     Mutually Develop and Foster  Achievement of Patient Goals --  6/17/2022  3:35 PM by Yoli Dao, RN     Care Management Activities:      - not discussed during this outreach      Notes:                      · Current Specialty Plan of Care Status signed by both patient and provider    Instructions   · Patient was provided an electronic copy of care plan  · CCM services were explained and offered and patient has accepted these services.  · Patient has given their written consent to receive CCM services and understands that this includes the authorization of electronic communication of medical information with the other treating providers.  · Patient understands that they may stop CCM services at any time and these changes will be effective at the end of the calendar month and will effectively revocate the agreement of CCM services.  · Patient understands that only one practitioner can furnish and be paid for CCM services during one calendar month.  Patient also understands that there may be co-payment or deductible fees in association with CCM services.  · Patient will continue with at least monthly follow-up calls with the Nurse Navigator.    YOLI GOODMAN  Ambulatory Case Management    6/29/2022, 13:51 EDT

## 2022-06-30 RX ORDER — POLYETHYLENE GLYCOL 3350 17 G/17G
17 POWDER, FOR SOLUTION ORAL DAILY
Qty: 527 G | Refills: 1 | Status: SHIPPED | OUTPATIENT
Start: 2022-06-30 | End: 2022-07-30

## 2022-07-01 ENCOUNTER — OFFICE VISIT (OUTPATIENT)
Dept: FAMILY MEDICINE CLINIC | Facility: CLINIC | Age: 57
End: 2022-07-01

## 2022-07-01 ENCOUNTER — PATIENT OUTREACH (OUTPATIENT)
Dept: CASE MANAGEMENT | Facility: OTHER | Age: 57
End: 2022-07-01

## 2022-07-01 VITALS
BODY MASS INDEX: 25.62 KG/M2 | HEART RATE: 88 BPM | DIASTOLIC BLOOD PRESSURE: 68 MMHG | HEIGHT: 66 IN | OXYGEN SATURATION: 95 % | WEIGHT: 159.4 LBS | SYSTOLIC BLOOD PRESSURE: 112 MMHG | TEMPERATURE: 97.8 F

## 2022-07-01 DIAGNOSIS — L97.221 VENOUS STASIS ULCER OF LEFT CALF LIMITED TO BREAKDOWN OF SKIN, UNSPECIFIED WHETHER VARICOSE VEINS PRESENT: ICD-10-CM

## 2022-07-01 DIAGNOSIS — M25.551 RIGHT HIP PAIN: ICD-10-CM

## 2022-07-01 DIAGNOSIS — L97.211 VENOUS STASIS ULCER OF RIGHT CALF LIMITED TO BREAKDOWN OF SKIN, UNSPECIFIED WHETHER VARICOSE VEINS PRESENT: ICD-10-CM

## 2022-07-01 DIAGNOSIS — N39.46 MIXED STRESS AND URGE INCONTINENCE: ICD-10-CM

## 2022-07-01 DIAGNOSIS — I83.012 VENOUS STASIS ULCER OF RIGHT CALF LIMITED TO BREAKDOWN OF SKIN, UNSPECIFIED WHETHER VARICOSE VEINS PRESENT: ICD-10-CM

## 2022-07-01 DIAGNOSIS — R10.2 SUPRAPUBIC PAIN: Primary | ICD-10-CM

## 2022-07-01 DIAGNOSIS — E11.65 TYPE 2 DIABETES MELLITUS WITH HYPERGLYCEMIA, WITH LONG-TERM CURRENT USE OF INSULIN: Primary | ICD-10-CM

## 2022-07-01 DIAGNOSIS — I83.022 VENOUS STASIS ULCER OF LEFT CALF LIMITED TO BREAKDOWN OF SKIN, UNSPECIFIED WHETHER VARICOSE VEINS PRESENT: ICD-10-CM

## 2022-07-01 DIAGNOSIS — R40.0 DAYTIME SLEEPINESS: ICD-10-CM

## 2022-07-01 DIAGNOSIS — G47.00 INSOMNIA, UNSPECIFIED TYPE: ICD-10-CM

## 2022-07-01 DIAGNOSIS — Z79.4 TYPE 2 DIABETES MELLITUS WITH HYPERGLYCEMIA, WITH LONG-TERM CURRENT USE OF INSULIN: Primary | ICD-10-CM

## 2022-07-01 LAB
BILIRUB BLD-MCNC: ABNORMAL MG/DL
CLARITY, POC: ABNORMAL
COLOR UR: ABNORMAL
GLUCOSE UR STRIP-MCNC: NEGATIVE MG/DL
KETONES UR QL: ABNORMAL
LEUKOCYTE EST, POC: ABNORMAL
NITRITE UR-MCNC: NEGATIVE MG/ML
PH UR: 5.5 [PH] (ref 5–8)
PROT UR STRIP-MCNC: ABNORMAL MG/DL
RBC # UR STRIP: ABNORMAL /UL
SP GR UR: 1.03 (ref 1–1.03)
UROBILINOGEN UR QL: NORMAL

## 2022-07-01 PROCEDURE — 99214 OFFICE O/P EST MOD 30 MIN: CPT | Performed by: NURSE PRACTITIONER

## 2022-07-01 NOTE — PROGRESS NOTES
"Chief Complaint  Pelvic Pain    Subjective        Isha Llanes presents to [facility]  History of Present Illness  Presents today for a follow-up on her ulcers on her bilateral lower extremities.  Venous stasis ulcers have been improving with her doing the wraps then applying the ointment Bactroban.  Patient is reports there is a 2 ulcerations on the back of her right leg and one on her left leg.  Describes them as itchy.  Complains of bilateral lower extremity pain.  She also is having right hip pain when standing and moving her pelvis or right hip will pop.    Patient has a history of Hodgkin's lymphoma and lupus.    Patient states she has been having mixed urinary and urgency incontinence.  Also having suprapubic pain.  Denies fever and chills.    Patient states she has insomnia.  Has difficulty sleeping at night.  She did not like how she felt on hydroxyzine.  States she had nightmares on trazodone.    Objective   Vital Signs:  /68   Pulse 88   Temp 97.8 °F (36.6 °C)   Ht 167.6 cm (66\")   Wt 72.3 kg (159 lb 6.4 oz)   SpO2 95%   BMI 25.73 kg/m²   Estimated body mass index is 25.73 kg/m² as calculated from the following:    Height as of this encounter: 167.6 cm (66\").    Weight as of this encounter: 72.3 kg (159 lb 6.4 oz).          Physical Exam  Vitals reviewed.   Constitutional:       Appearance: Normal appearance. She is well-developed.   HENT:      Head: Normocephalic and atraumatic.      Right Ear: External ear normal.      Left Ear: External ear normal.      Mouth/Throat:      Pharynx: No oropharyngeal exudate.   Eyes:      Conjunctiva/sclera: Conjunctivae normal.      Pupils: Pupils are equal, round, and reactive to light.   Cardiovascular:      Rate and Rhythm: Normal rate and regular rhythm.      Heart sounds: No murmur heard.    No friction rub. No gallop.   Pulmonary:      Effort: Pulmonary effort is normal.      Breath sounds: Normal breath sounds. " No wheezing or rhonchi.   Abdominal:      General: Bowel sounds are normal. There is no distension.      Palpations: Abdomen is soft.      Tenderness: There is no abdominal tenderness.   Skin:     General: Skin is warm and dry.      Findings: Wound present.      Comments: Ulcerations on lower extremities are healing well.  There are a few residual ulcerations on the right and left lower extremity.   Neurological:      Mental Status: She is alert and oriented to person, place, and time.   Psychiatric:         Mood and Affect: Mood is depressed.                Result Review :             Brief Urine Lab Results  (Last result in the past 365 days)      Color   Clarity   Blood   Leuk Est   Nitrite   Protein   CREAT   Urine HCG        07/01/22 0758 [place]   Cloudy   Trace   Small (1+)   Negative   30 mg/dL                    Assessment and Plan   Diagnoses and all orders for this visit:    1. Suprapubic pain (Primary)  -     POCT urinalysis dipstick, manual  -     XR Hip With or Without Pelvis 2 - 3 View Right; Future    2. Right hip pain  -     XR Hip With or Without Pelvis 2 - 3 View Right; Future    3. Mixed stress and urge incontinence    4. Insomnia, unspecified type  -     Ambulatory Referral to Sleep Medicine    5. Daytime sleepiness  -     Ambulatory Referral to Sleep Medicine    6. Venous stasis ulcer of right calf limited to breakdown of skin, unspecified whether varicose veins present (HCC)    7. Venous stasis ulcer of left calf limited to breakdown of skin, unspecified whether varicose veins present (HCC)    Other orders  -     Cancel: Urine Culture - Urine, Urine, Clean Catch    Will send urine out for culture and sensitivity.  Abnormal urinalysis.  Encourage her to drink more fluids.  Will consult sleep medicine for her daytime sleepiness and insomnia.  Will swab and send culture for venous stasis ulcerations.  She has previously been treated with daptomycin.  She has several drug allergies.  Urine culture  and wound swab will be sent to [facility].  Patient was recently established with home health.  Home health will work with PT OT and wound dressing changes.         Follow Up   Return in about 4 weeks (around 7/29/2022), or if symptoms worsen or fail to improve, for Next scheduled follow up.  Patient was given instructions and counseling regarding her condition or for health maintenance advice. Please see specific information pulled into the AVS if appropriate.

## 2022-07-05 ENCOUNTER — TELEPHONE (OUTPATIENT)
Dept: CASE MANAGEMENT | Facility: OTHER | Age: 57
End: 2022-07-05

## 2022-07-05 ENCOUNTER — PATIENT OUTREACH (OUTPATIENT)
Dept: CASE MANAGEMENT | Facility: OTHER | Age: 57
End: 2022-07-05

## 2022-07-05 ENCOUNTER — TELEPHONE (OUTPATIENT)
Dept: FAMILY MEDICINE CLINIC | Facility: CLINIC | Age: 57
End: 2022-07-05

## 2022-07-05 DIAGNOSIS — L97.229 VENOUS STASIS ULCER OF LEFT CALF, UNSPECIFIED ULCER STAGE, UNSPECIFIED WHETHER VARICOSE VEINS PRESENT: ICD-10-CM

## 2022-07-05 DIAGNOSIS — S81.809D NON-HEALING WOUND OF LOWER EXTREMITY, SUBSEQUENT ENCOUNTER: Primary | ICD-10-CM

## 2022-07-05 DIAGNOSIS — Z79.4 TYPE 2 DIABETES MELLITUS WITH HYPERGLYCEMIA, WITH LONG-TERM CURRENT USE OF INSULIN: ICD-10-CM

## 2022-07-05 DIAGNOSIS — I83.012 VENOUS STASIS ULCER OF RIGHT CALF, UNSPECIFIED ULCER STAGE, UNSPECIFIED WHETHER VARICOSE VEINS PRESENT: ICD-10-CM

## 2022-07-05 DIAGNOSIS — I83.022 VENOUS STASIS ULCER OF LEFT CALF, UNSPECIFIED ULCER STAGE, UNSPECIFIED WHETHER VARICOSE VEINS PRESENT: ICD-10-CM

## 2022-07-05 DIAGNOSIS — E11.65 TYPE 2 DIABETES MELLITUS WITH HYPERGLYCEMIA, WITH LONG-TERM CURRENT USE OF INSULIN: ICD-10-CM

## 2022-07-05 DIAGNOSIS — L97.219 VENOUS STASIS ULCER OF RIGHT CALF, UNSPECIFIED ULCER STAGE, UNSPECIFIED WHETHER VARICOSE VEINS PRESENT: ICD-10-CM

## 2022-07-05 DIAGNOSIS — Z91.199 NONCOMPLIANCE: ICD-10-CM

## 2022-07-05 RX ORDER — LEVOFLOXACIN 500 MG/1
500 TABLET, FILM COATED ORAL DAILY
Qty: 7 TABLET | Refills: 0 | Status: SHIPPED | OUTPATIENT
Start: 2022-07-05 | End: 2022-07-20

## 2022-07-05 RX ORDER — GRANULES FOR ORAL 3 G/1
3 POWDER ORAL
Qty: 9 G | Refills: 0 | Status: SHIPPED | OUTPATIENT
Start: 2022-07-05 | End: 2022-07-20

## 2022-07-05 NOTE — TELEPHONE ENCOUNTER
Nurse from  called stating they are going to put collagen dressing on pt's legs, cover with Kerlex and ACE.     Also, social work consulted through . Pt has no support at home, crawling on hands and knees, inside and outside home. $50 a month for food. Roaches in home. Used a dirty t-shirt to wipe off wound.

## 2022-07-05 NOTE — OUTREACH NOTE
AMBULATORY CASE MANAGEMENT NOTE    Name and Relationship of Patient/Support Person:  nurse -     [facility] Interim Update    Home health nurse called to talk about dressings, see chart for telephone encounter to pcp.  SW referred via .  Pt has pending cultures for wounds on legs and urine.  Pt no showed to mammogram, per chart.    UPDATE: PCP to order Levaquin x7 days and Phosphomyacin every 3 days, total 3 doses. Will update home health.   nurse updated.  staff to update pt since she has no working phone and they will see her this week.    [facility] Interim Update    Gave daughter update on antibiotics.      CHARLOTTE GOODMAN  Ambulatory Case Management    7/5/2022, 14:32 EDT

## 2022-07-05 NOTE — TELEPHONE ENCOUNTER
Patient is requesting results from the cultures that were sent to [facility]. Please advise.

## 2022-07-05 NOTE — TELEPHONE ENCOUNTER
Caller: Isha Llanes    Relationship: Self    Best call back number: [phone]    What test was performed: URINALYSIS    When was the test performed: 07/01/22    Where was the test performed: IN OFFICE    Additional notes: THE PATIENT WOULD LIKE A CALL BACK WITH TEST RESULTS ASAP

## 2022-07-07 NOTE — OUTREACH NOTE
AMBULATORY CASE MANAGEMENT NOTE    Name and Relationship of Patient/Support Person:  -         MALINI updated and reviewed with the patient during this program:  Physical Activity: Insufficiently Active   • Days of Exercise per Week: 1 day   • Minutes of Exercise per Session: 10 min      Social Connections: Socially Isolated   • Frequency of Communication with Friends and Family: Once a week   • Frequency of Social Gatherings with Friends and Family: Never   • Attends [employer] Services: Never   • Active Member of Clubs or Organizations: No   • Attends Club or Organization Meetings: Never   • Marital Status:       Stress: Stress Concern Present   • Feeling of Stress : Very much       CHARLOTTE GOODMAN  Ambulatory Case Management    7/7/2022, 15:25 EDT

## 2022-07-08 ENCOUNTER — TELEPHONE (OUTPATIENT)
Dept: FAMILY MEDICINE CLINIC | Facility: CLINIC | Age: 57
End: 2022-07-08

## 2022-07-08 NOTE — TELEPHONE ENCOUNTER
HARRIET WITH [facility] CALLED TO STATE THAT THEY WOULD BE SEEING PT ONCE A WEEK FOR NEXT FOUR WEEKS.  PATIENT IS REQUESTING A REFERRAL TO SEE A NEURAL SURGEON DUE TO HER CHRONIC BACK PAIN.

## 2022-07-11 ENCOUNTER — TELEPHONE (OUTPATIENT)
Dept: FAMILY MEDICINE CLINIC | Facility: CLINIC | Age: 57
End: 2022-07-11

## 2022-07-11 DIAGNOSIS — M54.41 CHRONIC BILATERAL LOW BACK PAIN WITH BILATERAL SCIATICA: Primary | ICD-10-CM

## 2022-07-11 DIAGNOSIS — M54.42 CHRONIC BILATERAL LOW BACK PAIN WITH BILATERAL SCIATICA: Primary | ICD-10-CM

## 2022-07-11 DIAGNOSIS — G89.29 CHRONIC BILATERAL LOW BACK PAIN WITH BILATERAL SCIATICA: Primary | ICD-10-CM

## 2022-07-11 RX ORDER — FAMOTIDINE 20 MG/1
20 TABLET, FILM COATED ORAL NIGHTLY PRN
Qty: 90 TABLET | Refills: 1 | Status: SHIPPED | OUTPATIENT
Start: 2022-07-11 | End: 2022-09-16

## 2022-07-11 RX ORDER — PANTOPRAZOLE SODIUM 40 MG/1
TABLET, DELAYED RELEASE ORAL
Qty: 90 TABLET | Refills: 1 | Status: SHIPPED | OUTPATIENT
Start: 2022-07-11 | End: 2022-09-16

## 2022-07-11 NOTE — TELEPHONE ENCOUNTER
Phone call received from patient stating that she is unable to take current two medications Levaquin and Monurol as they are giving her severe diarrhea. Patient states that she wants IV medication at home since she has home health.  Informed patient that her concerns will be relayed to her provider tomorrow as he is off today with voiced understanding.

## 2022-07-12 ENCOUNTER — TELEPHONE (OUTPATIENT)
Dept: FAMILY MEDICINE CLINIC | Facility: CLINIC | Age: 57
End: 2022-07-12

## 2022-07-12 NOTE — TELEPHONE ENCOUNTER
Spoke with patient on the phone.  Patient states she is having diarrhea and upset stomach since being put on the medication of Levaquin and fosfomycin.  She has both wounds on her lower extremities and a UTI.  She has 22 allergies.  We are very limited range of what antibiotics will work for the patient.  Patient refuses to take Levaquin and the fosfomycin.  Leg wounds did show improvement at her last office visit.  She is also not following up with wound care.

## 2022-07-13 ENCOUNTER — PATIENT OUTREACH (OUTPATIENT)
Dept: CASE MANAGEMENT | Facility: OTHER | Age: 57
End: 2022-07-13

## 2022-07-13 DIAGNOSIS — S81.809D NON-HEALING WOUND OF LOWER EXTREMITY, SUBSEQUENT ENCOUNTER: Primary | ICD-10-CM

## 2022-07-13 RX ORDER — LINEZOLID 600 MG/1
600 TABLET, FILM COATED ORAL 2 TIMES DAILY
Qty: 20 TABLET | Refills: 0 | Status: SHIPPED | OUTPATIENT
Start: 2022-07-13 | End: 2022-07-20

## 2022-07-14 ENCOUNTER — PATIENT OUTREACH (OUTPATIENT)
Dept: CASE MANAGEMENT | Facility: OTHER | Age: 57
End: 2022-07-14

## 2022-07-14 ENCOUNTER — TELEPHONE (OUTPATIENT)
Dept: FAMILY MEDICINE CLINIC | Facility: CLINIC | Age: 57
End: 2022-07-14

## 2022-07-14 DIAGNOSIS — Z79.4 TYPE 2 DIABETES MELLITUS WITH HYPERGLYCEMIA, WITH LONG-TERM CURRENT USE OF INSULIN: ICD-10-CM

## 2022-07-14 DIAGNOSIS — E11.65 TYPE 2 DIABETES MELLITUS WITH HYPERGLYCEMIA, WITH LONG-TERM CURRENT USE OF INSULIN: ICD-10-CM

## 2022-07-14 DIAGNOSIS — S81.809D NON-HEALING WOUND OF LOWER EXTREMITY, SUBSEQUENT ENCOUNTER: Primary | ICD-10-CM

## 2022-07-14 PROCEDURE — 99439 CHRNC CARE MGMT STAF EA ADDL: CPT | Performed by: NURSE PRACTITIONER

## 2022-07-14 PROCEDURE — 99490 CHRNC CARE MGMT STAFF 1ST 20: CPT | Performed by: NURSE PRACTITIONER

## 2022-07-14 NOTE — TELEPHONE ENCOUNTER
Patient was warmed transferred to me and let me know that the antibiotic that she is inquiring about needed a prior authorization and asked how does she go about getting medication. Told patient I would do PA. PA has been done and can take up to 5 business days. Told patient to call us back since we can't call her to let her know the outcome because it can approve or deny in 1 to 5 business days.

## 2022-07-14 NOTE — OUTREACH NOTE
AMBULATORY CASE MANAGEMENT NOTE    Name and Relationship of Patient/Support Person:  -     Deepa with [facility] reached out asking if pt was supposed to change abx. Consulted PCP who ordered new abx. Deepa updated. Pt also got sent in a probiotic.    [facility] Interim Update    7/14: Called pt, no answer. Left message.        CHARLOTTE GOODMAN  Ambulatory Case Management    7/14/2022, 13:06 EDT

## 2022-07-15 NOTE — OUTREACH NOTE
AMBULATORY CASE MANAGEMENT NOTE    Name and Relationship of Patient/Support Person: Sheila Watson - Emergency Contact    Pt called and left message asking if PCP was going to start her on IV abx. Stated she was having sweats and possible fever. When I called pt back, no answer, left message.    Called daughter and explained pcp sent janie  New oral abx yesterday. It needs PA (in process). She added that there was an episode in the last 24 hours where pt was hard to arouse. For all of these symptoms, I advised that she could be going septic and to go to ER. She was on her way home and agreed to relay message to pt.    PCP updated.      CHARLOTTE GOODMAN  Ambulatory Case Management    7/15/2022, 08:05 EDT

## 2022-07-20 DIAGNOSIS — L97.219 VENOUS STASIS ULCER OF RIGHT CALF, UNSPECIFIED ULCER STAGE, UNSPECIFIED WHETHER VARICOSE VEINS PRESENT: ICD-10-CM

## 2022-07-20 DIAGNOSIS — I83.022 VENOUS STASIS ULCER OF LEFT CALF, UNSPECIFIED ULCER STAGE, UNSPECIFIED WHETHER VARICOSE VEINS PRESENT: ICD-10-CM

## 2022-07-20 DIAGNOSIS — N30.00 ACUTE CYSTITIS WITHOUT HEMATURIA: ICD-10-CM

## 2022-07-20 DIAGNOSIS — L97.229 VENOUS STASIS ULCER OF LEFT CALF, UNSPECIFIED ULCER STAGE, UNSPECIFIED WHETHER VARICOSE VEINS PRESENT: ICD-10-CM

## 2022-07-20 DIAGNOSIS — I83.012 VENOUS STASIS ULCER OF RIGHT CALF, UNSPECIFIED ULCER STAGE, UNSPECIFIED WHETHER VARICOSE VEINS PRESENT: ICD-10-CM

## 2022-07-20 DIAGNOSIS — L03.119 CELLULITIS OF LOWER EXTREMITY, UNSPECIFIED LATERALITY: Primary | ICD-10-CM

## 2022-07-20 NOTE — PROGRESS NOTES
She has venous stasis ulcers bilaterally.  Patient also has a acute cystitis. Wound culture is positive for Staph aureus on her lower extremities.  Urine is positive for Staphylococcus epidermidis in Klebsiella pneumonia.  She was prescribed Levaquin and fosfomycin.  Patient reports she was having side effects and did not tolerate the Levaquin and fosfomycin and stopped taking medication.  Patient has previously tolerated daptomycin.  The linezolid require prior authorization and was declined.

## 2022-07-21 ENCOUNTER — TELEPHONE (OUTPATIENT)
Dept: FAMILY MEDICINE CLINIC | Facility: CLINIC | Age: 57
End: 2022-07-21

## 2022-07-21 NOTE — TELEPHONE ENCOUNTER
Spoke with Jamey ([phone]) at [facility] with [facility] pharmacy. She called stating she has been trying to get ahold of patient or her daughter but has been unsuccessful. I tried calling patient's, daughter's, and father's numbers on file. The numbers are not working or no voicemail.

## 2022-07-22 ENCOUNTER — TELEPHONE (OUTPATIENT)
Dept: FAMILY MEDICINE CLINIC | Facility: CLINIC | Age: 57
End: 2022-07-22

## 2022-07-22 ENCOUNTER — LAB REQUISITION (OUTPATIENT)
Dept: LAB | Facility: HOSPITAL | Age: 57
End: 2022-07-22

## 2022-07-22 DIAGNOSIS — M86.9 OSTEOMYELITIS, UNSPECIFIED: ICD-10-CM

## 2022-07-22 LAB
ALBUMIN SERPL-MCNC: 4 G/DL (ref 3.5–5.2)
ALBUMIN/GLOB SERPL: 1.3 G/DL
ALP SERPL-CCNC: 58 U/L (ref 39–117)
ALT SERPL W P-5'-P-CCNC: 7 U/L (ref 1–33)
ANION GAP SERPL CALCULATED.3IONS-SCNC: 9.2 MMOL/L (ref 5–15)
AST SERPL-CCNC: 13 U/L (ref 1–32)
BASOPHILS # BLD AUTO: 0.01 10*3/MM3 (ref 0–0.2)
BASOPHILS NFR BLD AUTO: 0.2 % (ref 0–1.5)
BILIRUB SERPL-MCNC: 0.2 MG/DL (ref 0–1.2)
BUN SERPL-MCNC: 13 MG/DL (ref 6–20)
BUN/CREAT SERPL: 24.5 (ref 7–25)
CALCIUM SPEC-SCNC: 9.2 MG/DL (ref 8.6–10.5)
CHLORIDE SERPL-SCNC: 101 MMOL/L (ref 98–107)
CK SERPL-CCNC: 23 U/L (ref 20–180)
CO2 SERPL-SCNC: 27.8 MMOL/L (ref 22–29)
CREAT SERPL-MCNC: 0.53 MG/DL (ref 0.57–1)
CRP SERPL-MCNC: 1.21 MG/DL (ref 0–0.5)
DEPRECATED RDW RBC AUTO: 47.5 FL (ref 37–54)
EGFRCR SERPLBLD CKD-EPI 2021: 108.7 ML/MIN/1.73
EOSINOPHIL # BLD AUTO: 0.12 10*3/MM3 (ref 0–0.4)
EOSINOPHIL NFR BLD AUTO: 2.8 % (ref 0.3–6.2)
ERYTHROCYTE [DISTWIDTH] IN BLOOD BY AUTOMATED COUNT: 15 % (ref 12.3–15.4)
ERYTHROCYTE [SEDIMENTATION RATE] IN BLOOD: 23 MM/HR (ref 0–30)
GLOBULIN UR ELPH-MCNC: 3.2 GM/DL
GLUCOSE SERPL-MCNC: 143 MG/DL (ref 65–99)
HCT VFR BLD AUTO: 39.9 % (ref 34–46.6)
HGB BLD-MCNC: 12.1 G/DL (ref 12–15.9)
IMM GRANULOCYTES # BLD AUTO: 0.01 10*3/MM3 (ref 0–0.05)
IMM GRANULOCYTES NFR BLD AUTO: 0.2 % (ref 0–0.5)
LYMPHOCYTES # BLD AUTO: 1.32 10*3/MM3 (ref 0.7–3.1)
LYMPHOCYTES NFR BLD AUTO: 30.6 % (ref 19.6–45.3)
MCH RBC QN AUTO: 26.2 PG (ref 26.6–33)
MCHC RBC AUTO-ENTMCNC: 30.3 G/DL (ref 31.5–35.7)
MCV RBC AUTO: 86.6 FL (ref 79–97)
MONOCYTES # BLD AUTO: 0.43 10*3/MM3 (ref 0.1–0.9)
MONOCYTES NFR BLD AUTO: 10 % (ref 5–12)
NEUTROPHILS NFR BLD AUTO: 2.43 10*3/MM3 (ref 1.7–7)
NEUTROPHILS NFR BLD AUTO: 56.2 % (ref 42.7–76)
NRBC BLD AUTO-RTO: 0 /100 WBC (ref 0–0.2)
PLATELET # BLD AUTO: 146 10*3/MM3 (ref 140–450)
PMV BLD AUTO: 11.4 FL (ref 6–12)
POTASSIUM SERPL-SCNC: 4.4 MMOL/L (ref 3.5–5.2)
PROT SERPL-MCNC: 7.2 G/DL (ref 6–8.5)
RBC # BLD AUTO: 4.61 10*6/MM3 (ref 3.77–5.28)
SODIUM SERPL-SCNC: 138 MMOL/L (ref 136–145)
WBC NRBC COR # BLD: 4.32 10*3/MM3 (ref 3.4–10.8)

## 2022-07-22 PROCEDURE — 82550 ASSAY OF CK (CPK): CPT | Performed by: NURSE PRACTITIONER

## 2022-07-22 PROCEDURE — 86140 C-REACTIVE PROTEIN: CPT | Performed by: NURSE PRACTITIONER

## 2022-07-22 PROCEDURE — 80053 COMPREHEN METABOLIC PANEL: CPT | Performed by: NURSE PRACTITIONER

## 2022-07-22 PROCEDURE — 85652 RBC SED RATE AUTOMATED: CPT | Performed by: NURSE PRACTITIONER

## 2022-07-22 PROCEDURE — 85025 COMPLETE CBC W/AUTO DIFF WBC: CPT | Performed by: NURSE PRACTITIONER

## 2022-07-27 RX ORDER — FLUCONAZOLE 150 MG/1
TABLET ORAL
Qty: 2 TABLET | Refills: 0 | Status: SHIPPED | OUTPATIENT
Start: 2022-07-27 | End: 2022-09-29

## 2022-07-28 ENCOUNTER — LAB REQUISITION (OUTPATIENT)
Dept: LAB | Facility: HOSPITAL | Age: 57
End: 2022-07-28

## 2022-07-28 ENCOUNTER — PATIENT OUTREACH (OUTPATIENT)
Dept: CASE MANAGEMENT | Facility: OTHER | Age: 57
End: 2022-07-28

## 2022-07-28 DIAGNOSIS — E11.65 TYPE 2 DIABETES MELLITUS WITH HYPERGLYCEMIA, WITH LONG-TERM CURRENT USE OF INSULIN: ICD-10-CM

## 2022-07-28 DIAGNOSIS — S81.809D NON-HEALING WOUND OF LOWER EXTREMITY, SUBSEQUENT ENCOUNTER: Primary | ICD-10-CM

## 2022-07-28 DIAGNOSIS — N39.0 URINARY TRACT INFECTION, SITE NOT SPECIFIED: ICD-10-CM

## 2022-07-28 DIAGNOSIS — I83.012 VENOUS STASIS ULCER OF RIGHT CALF, UNSPECIFIED ULCER STAGE, UNSPECIFIED WHETHER VARICOSE VEINS PRESENT: ICD-10-CM

## 2022-07-28 DIAGNOSIS — L97.219 VENOUS STASIS ULCER OF RIGHT CALF, UNSPECIFIED ULCER STAGE, UNSPECIFIED WHETHER VARICOSE VEINS PRESENT: ICD-10-CM

## 2022-07-28 DIAGNOSIS — Z79.4 TYPE 2 DIABETES MELLITUS WITH HYPERGLYCEMIA, WITH LONG-TERM CURRENT USE OF INSULIN: ICD-10-CM

## 2022-07-28 LAB
ALBUMIN SERPL-MCNC: 3.9 G/DL (ref 3.5–5.2)
ALBUMIN/GLOB SERPL: 1.3 G/DL
ALP SERPL-CCNC: 80 U/L (ref 39–117)
ALT SERPL W P-5'-P-CCNC: 8 U/L (ref 1–33)
ANION GAP SERPL CALCULATED.3IONS-SCNC: 14 MMOL/L (ref 5–15)
AST SERPL-CCNC: 10 U/L (ref 1–32)
BASOPHILS # BLD AUTO: 0.01 10*3/MM3 (ref 0–0.2)
BASOPHILS NFR BLD AUTO: 0.2 % (ref 0–1.5)
BILIRUB SERPL-MCNC: 0.2 MG/DL (ref 0–1.2)
BUN SERPL-MCNC: 6 MG/DL (ref 6–20)
BUN/CREAT SERPL: 12 (ref 7–25)
CALCIUM SPEC-SCNC: 8.3 MG/DL (ref 8.6–10.5)
CHLORIDE SERPL-SCNC: 99 MMOL/L (ref 98–107)
CK SERPL-CCNC: 38 U/L (ref 20–180)
CO2 SERPL-SCNC: 26 MMOL/L (ref 22–29)
CREAT SERPL-MCNC: 0.5 MG/DL (ref 0.57–1)
CRP SERPL-MCNC: 1.18 MG/DL (ref 0–0.5)
DEPRECATED RDW RBC AUTO: 42.5 FL (ref 37–54)
EGFRCR SERPLBLD CKD-EPI 2021: 110.2 ML/MIN/1.73
EOSINOPHIL # BLD AUTO: 0.1 10*3/MM3 (ref 0–0.4)
EOSINOPHIL NFR BLD AUTO: 2 % (ref 0.3–6.2)
ERYTHROCYTE [DISTWIDTH] IN BLOOD BY AUTOMATED COUNT: 14.4 % (ref 12.3–15.4)
ERYTHROCYTE [SEDIMENTATION RATE] IN BLOOD: 25 MM/HR (ref 0–30)
GLOBULIN UR ELPH-MCNC: 3.1 GM/DL
GLUCOSE SERPL-MCNC: 171 MG/DL (ref 65–99)
HCT VFR BLD AUTO: 38.6 % (ref 34–46.6)
HGB BLD-MCNC: 12.2 G/DL (ref 12–15.9)
IMM GRANULOCYTES # BLD AUTO: 0.02 10*3/MM3 (ref 0–0.05)
IMM GRANULOCYTES NFR BLD AUTO: 0.4 % (ref 0–0.5)
LYMPHOCYTES # BLD AUTO: 1.16 10*3/MM3 (ref 0.7–3.1)
LYMPHOCYTES NFR BLD AUTO: 23.5 % (ref 19.6–45.3)
MCH RBC QN AUTO: 26.3 PG (ref 26.6–33)
MCHC RBC AUTO-ENTMCNC: 31.6 G/DL (ref 31.5–35.7)
MCV RBC AUTO: 83.2 FL (ref 79–97)
MONOCYTES # BLD AUTO: 0.36 10*3/MM3 (ref 0.1–0.9)
MONOCYTES NFR BLD AUTO: 7.3 % (ref 5–12)
NEUTROPHILS NFR BLD AUTO: 3.28 10*3/MM3 (ref 1.7–7)
NEUTROPHILS NFR BLD AUTO: 66.6 % (ref 42.7–76)
NRBC BLD AUTO-RTO: 0 /100 WBC (ref 0–0.2)
PLATELET # BLD AUTO: 170 10*3/MM3 (ref 140–450)
PMV BLD AUTO: 11.3 FL (ref 6–12)
POTASSIUM SERPL-SCNC: 3.6 MMOL/L (ref 3.5–5.2)
PROT SERPL-MCNC: 7 G/DL (ref 6–8.5)
RBC # BLD AUTO: 4.64 10*6/MM3 (ref 3.77–5.28)
SODIUM SERPL-SCNC: 139 MMOL/L (ref 136–145)
WBC NRBC COR # BLD: 4.93 10*3/MM3 (ref 3.4–10.8)

## 2022-07-28 PROCEDURE — 85652 RBC SED RATE AUTOMATED: CPT | Performed by: NURSE PRACTITIONER

## 2022-07-28 PROCEDURE — 85025 COMPLETE CBC W/AUTO DIFF WBC: CPT | Performed by: NURSE PRACTITIONER

## 2022-07-28 PROCEDURE — 82550 ASSAY OF CK (CPK): CPT | Performed by: NURSE PRACTITIONER

## 2022-07-28 PROCEDURE — 80053 COMPREHEN METABOLIC PANEL: CPT | Performed by: NURSE PRACTITIONER

## 2022-07-28 PROCEDURE — 86140 C-REACTIVE PROTEIN: CPT | Performed by: NURSE PRACTITIONER

## 2022-07-28 NOTE — OUTREACH NOTE
[facility] End of Month Documentation    This Chronic Medical Management Care Plan for Isha Llanes, 56 y.o. female, has been monitored and managed and a new plan of care implemented for the month of July.  A cumulative time of 54  minutes was spent on this patient record this month, including phone call with primary care provider; phone call with other providers; electronic communication with primary care provider; chart review.    Regarding the patient's problems: has Cancer associated pain; Presence of intrathecal pump; Chronic low back pain with bilateral sciatica; Sacroiliac inflammation (HCC); Chronic pain syndrome; Pelvic pain; Aortic stenosis, severe; Dyspnea on effort; Other pulmonary embolism without acute cor pulmonale (HCC); Nonrheumatic aortic valve stenosis; Hip pain; Anxiety disorder; Allergic rhinitis due to allergen; Arthritis; Asthma; Kidney disease; CHF (congestive heart failure) ([facility]); Chronic obstructive pulmonary disease (HCC); Diabetes mellitus, type 2 (HCC); Depression; GERD (gastroesophageal reflux disease); S/P TAVR (transcatheter aortic valve replacement); Limited mobility; Venous hypertension of both lower extremities; Nicotine dependence; Mitral valve prolapse; Lupus (systemic lupus erythematosus) (HCC); Long term current use of anticoagulant therapy; History of Hodgkin's lymphoma; Hepatic steatosis; Heart murmur; CAD (coronary artery disease); Non-healing wound of lower extremity, subsequent encounter; MSSA (methicillin susceptible Staphylococcus aureus) infection; Sepsis ([facility]); Change in bowel habits; Nausea; Venous stasis ulcer of right calf (HCC); Venous stasis ulcer of left calf (HCC); and Noncompliance on their problem list., the following items were addressed: medical records; medications; changes to medical care, SW referral with home health and any changes can be found within the plan section of the note.  A detailed listing of time spent for chronic care management is tracked  within each outreach encounter.  Current medications include:  has a current medication list which includes the following prescription(s): acetaminophen, b-d uf iii mini pen needles, bacitracin, bacitracin, clopidogrel, daptomycin, ace bandage self-adhering, epinephrine, famotidine, flovent hfa, fluconazole, freestyle lite, furosemide, gabapentin, hydromorphone, hydroxyzine, insulin lispro, ipratropium-albuterol, freestyle, lantus solostar, pantoprazole, plenvu, polyethylene glycol, proair hfa, probiotic (lactobacillus), silver sulfadiazine, clenpiq, sodium chloride 0.9 % solution with HYDROmorphone (PF) 50 MG/5ML solution 0.2 mg/mL, sodium chloride, and triamcinolone, and the following Facility-Administered Medications: sterile water. and the patient is reported to be patient is compliant with medication protocol,  Medications are reported to be non-effective in controlling symptoms and changes have been made to the medication protocol.  Regarding these diagnoses, referrals were made to the following provider(s):  none.  All notes on chart for PCP to review.    The patient was monitored remotely for activity level; mood & behavior; pain; medications.    The patient's physical needs include:  help taking medications as prescribed; needs assistance with ADLs; physical healthcare; physician referral.     The patient's mental support needs include:  not applicable    The patient's cognitive support needs include:  not applicable    The patient's psychosocial support needs include:  need for increased support    The patient's functional needs include: physical healthcare; physician referral    The patient's environmental needs include:  no access to transportation,  referred    Care Plan overall comments:  No data recorded    Refer to previous outreach notes for more information on the areas listed above.    Monthly Billing Diagnoses  (S81.809D) Non-healing wound of lower extremity, subsequent encounter    (E11.65,   Z79.4) Type 2 diabetes mellitus with hyperglycemia, with long-term current use of insulin (HCC)    (I83.012,  L97.219) Venous stasis ulcer of right calf, unspecified ulcer stage, unspecified whether varicose veins present (HCC)    Medications   · Medications have been reconciled    Care Plan progress this month:      Recently Modified Care Plans Updates made since 6/27/2022 12:00 AM    No recently modified care plans.              Instructions   · Patient was provided an electronic copy of care plan  · CCM services were explained and offered and patient has accepted these services.  · Patient has given their written consent to receive CCM services and understands that this includes the authorization of electronic communication of medical information with the other treating providers.  · Patient understands that they may stop CCM services at any time and these changes will be effective at the end of the calendar month and will effectively revocate the agreement of CCM services.  · Patient understands that only one practitioner can furnish and be paid for CCM services during one calendar month.  Patient also understands that there may be co-payment or deductible fees in association with CCM services.  · Patient will continue with at least monthly follow-up calls with the Nurse Navigator.    CHARLOTTE GOODMAN  Ambulatory Case Management    7/28/2022, 14:11 EDT

## 2022-08-01 ENCOUNTER — TELEPHONE (OUTPATIENT)
Dept: FAMILY MEDICINE CLINIC | Facility: CLINIC | Age: 57
End: 2022-08-01

## 2022-08-01 NOTE — TELEPHONE ENCOUNTER
Caller: Isha lLanes    Relationship: Self    Best call back number: [phone]    Who are you requesting to speak with (clinical staff, provider,  specific staff member): CLINICAL    What was the call regarding: PATIENT STATES THE ANTIBIOTICS HAVEN'T HELPED A LOT AND THEY WOULD LIKE TO GET IN TO BE SEEN. PATIENT STATES THEY STILL HAVE DARKNESS ON KNUCKLES AND FACE, AND LEGS STILL HAVE PLACES THAT ARE LEAKING. PLEASE CALL PATIENT AND ADVISE.    Do you require a callback: YES

## 2022-08-02 NOTE — TELEPHONE ENCOUNTER
Tried to contact patient but phone is unable to accept incoming calls. Unsure of how to get a hold of patient. If patient calls back HUB PLEASE WARM TRANSFER.

## 2022-08-04 NOTE — TELEPHONE ENCOUNTER
Pt also needs to be seen in office for f/u appt. Called pt, no voicemail set up. Called daughter, no answer, no voicemail.      HUB WARM TRANSFER please

## 2022-08-05 ENCOUNTER — TELEPHONE (OUTPATIENT)
Dept: CASE MANAGEMENT | Facility: OTHER | Age: 57
End: 2022-08-05

## 2022-08-05 ENCOUNTER — PATIENT OUTREACH (OUTPATIENT)
Dept: CASE MANAGEMENT | Facility: OTHER | Age: 57
End: 2022-08-05

## 2022-08-05 DIAGNOSIS — Z85.71 HISTORY OF HODGKIN'S LYMPHOMA: ICD-10-CM

## 2022-08-05 DIAGNOSIS — B99.9 INFECTION: Primary | ICD-10-CM

## 2022-08-05 DIAGNOSIS — R19.7 DIARRHEA, UNSPECIFIED TYPE: ICD-10-CM

## 2022-08-05 DIAGNOSIS — F41.1 GENERALIZED ANXIETY DISORDER: ICD-10-CM

## 2022-08-05 DIAGNOSIS — R22.1 NECK NODULE: ICD-10-CM

## 2022-08-05 DIAGNOSIS — Z79.4 TYPE 2 DIABETES MELLITUS WITH HYPERGLYCEMIA, WITH LONG-TERM CURRENT USE OF INSULIN: ICD-10-CM

## 2022-08-05 DIAGNOSIS — E11.65 TYPE 2 DIABETES MELLITUS WITH HYPERGLYCEMIA, WITH LONG-TERM CURRENT USE OF INSULIN: ICD-10-CM

## 2022-08-05 DIAGNOSIS — F32.A DEPRESSION, UNSPECIFIED DEPRESSION TYPE: ICD-10-CM

## 2022-08-05 DIAGNOSIS — E11.65 TYPE 2 DIABETES MELLITUS WITH HYPERGLYCEMIA, WITH LONG-TERM CURRENT USE OF INSULIN: Primary | ICD-10-CM

## 2022-08-05 DIAGNOSIS — S81.809D NON-HEALING WOUND OF LOWER EXTREMITY, SUBSEQUENT ENCOUNTER: ICD-10-CM

## 2022-08-05 DIAGNOSIS — Z79.4 TYPE 2 DIABETES MELLITUS WITH HYPERGLYCEMIA, WITH LONG-TERM CURRENT USE OF INSULIN: Primary | ICD-10-CM

## 2022-08-05 PROCEDURE — 99490 CHRNC CARE MGMT STAFF 1ST 20: CPT | Performed by: NURSE PRACTITIONER

## 2022-08-05 PROCEDURE — 99439 CHRNC CARE MGMT STAF EA ADDL: CPT | Performed by: NURSE PRACTITIONER

## 2022-08-05 RX ORDER — SACCHAROMYCES BOULARDII 250 MG
250 CAPSULE ORAL 2 TIMES DAILY
Qty: 60 CAPSULE | Refills: 5 | Status: SHIPPED | OUTPATIENT
Start: 2022-08-05 | End: 2022-10-21

## 2022-08-05 NOTE — OUTREACH NOTE
AMBULATORY CASE MANAGEMENT NOTE    Name and Relationship of Patient/Support Person: Isha Llanes - Self    See telephone call for multiple orders (discussed on phone with pcp too) -  Multiple labs being done per home health. (Gave verbal to Hailey HOLT with [facility])  CT ordered. (called scheduling who got all three images rescheduled.) rescheduled. Could not make all three on same day. Requested 9-10 AM appts.)  Referral to psych and oncology entered.      CHARLOTTE GOODMAN  Ambulatory Case Management    8/5/2022, 11:57 EDT

## 2022-08-05 NOTE — TELEPHONE ENCOUNTER
MANY NEEDS:    1. Pt was given IV abx by pcp (after multiple PO meds did not work or needed PA). She was given #14 days worth. However, stopped 2 days ago (has three days left) due to severe GI issues/diarrhea. She is asking for new abx. I explained we likely needed to do more cultures. She cannot come in for appt due to needing 3+ days for TACK. I looked at appts next week. pcp out, multiple providers are overbooked. Plan is to order (pended) urine, blood, stool, and wound cultures. Will send another message to pcp about getting in for f/u. Please sign orders, if agreeable. Home health to collect. I HAVE NOT PENDED STOOL CULTURE, UNSURE WHICH TO ORDER. PT WORRIED ABOUT yeast or c diff. PLEASE ORDER. I am going to order a few labs too.    2. Pt asked for probiotic, per chart, none sent in. Please send in if agreeable.    3. Pt would like a psych (depression) and oncology (STAT, hx cancer, reoccurring nodules) referrals. Pended.    4. Pt would like me to reschedule abd and chest CT. She is asking for neck CT. She has nodules on both sides, hx cancer. NO contrast. Please order (pended) if agreeable.    If any other labs need to be ordered, please advise.

## 2022-08-08 ENCOUNTER — LAB REQUISITION (OUTPATIENT)
Dept: LAB | Facility: HOSPITAL | Age: 57
End: 2022-08-08

## 2022-08-08 DIAGNOSIS — I83.012: ICD-10-CM

## 2022-08-08 DIAGNOSIS — R19.7 DIARRHEA, UNSPECIFIED: ICD-10-CM

## 2022-08-08 DIAGNOSIS — I73.9 PERIPHERAL VASCULAR DISEASE, UNSPECIFIED: ICD-10-CM

## 2022-08-08 DIAGNOSIS — N39.0 URINARY TRACT INFECTION, SITE NOT SPECIFIED: ICD-10-CM

## 2022-08-08 DIAGNOSIS — Z86.14 PERSONAL HISTORY OF METHICILLIN RESISTANT STAPHYLOCOCCUS AUREUS INFECTION: ICD-10-CM

## 2022-08-08 DIAGNOSIS — I83.022: ICD-10-CM

## 2022-08-08 DIAGNOSIS — E11.65 TYPE 2 DIABETES MELLITUS WITH HYPERGLYCEMIA: ICD-10-CM

## 2022-08-08 LAB
ALBUMIN SERPL-MCNC: 3.9 G/DL (ref 3.5–5.2)
ALBUMIN/GLOB SERPL: 1.1 G/DL
ALP SERPL-CCNC: 91 U/L (ref 39–117)
ALT SERPL W P-5'-P-CCNC: 8 U/L (ref 1–33)
ANION GAP SERPL CALCULATED.3IONS-SCNC: 11 MMOL/L (ref 5–15)
AST SERPL-CCNC: 13 U/L (ref 1–32)
BACTERIA UR QL AUTO: ABNORMAL /HPF
BASOPHILS # BLD AUTO: 0.01 10*3/MM3 (ref 0–0.2)
BASOPHILS NFR BLD AUTO: 0.2 % (ref 0–1.5)
BILIRUB SERPL-MCNC: 0.2 MG/DL (ref 0–1.2)
BILIRUB UR QL STRIP: NEGATIVE
BUN SERPL-MCNC: 10 MG/DL (ref 6–20)
BUN/CREAT SERPL: 17.9 (ref 7–25)
CALCIUM SPEC-SCNC: 9.2 MG/DL (ref 8.6–10.5)
CHLORIDE SERPL-SCNC: 101 MMOL/L (ref 98–107)
CLARITY UR: ABNORMAL
CO2 SERPL-SCNC: 28 MMOL/L (ref 22–29)
COLOR UR: YELLOW
CREAT SERPL-MCNC: 0.56 MG/DL (ref 0.57–1)
DEPRECATED RDW RBC AUTO: 44.9 FL (ref 37–54)
EGFRCR SERPLBLD CKD-EPI 2021: 107.3 ML/MIN/1.73
EOSINOPHIL # BLD AUTO: 0.09 10*3/MM3 (ref 0–0.4)
EOSINOPHIL NFR BLD AUTO: 2.1 % (ref 0.3–6.2)
ERYTHROCYTE [DISTWIDTH] IN BLOOD BY AUTOMATED COUNT: 14.6 % (ref 12.3–15.4)
GLOBULIN UR ELPH-MCNC: 3.6 GM/DL
GLUCOSE SERPL-MCNC: 137 MG/DL (ref 65–99)
GLUCOSE UR STRIP-MCNC: NEGATIVE MG/DL
HCT VFR BLD AUTO: 38.9 % (ref 34–46.6)
HGB BLD-MCNC: 12.2 G/DL (ref 12–15.9)
HGB UR QL STRIP.AUTO: ABNORMAL
HYALINE CASTS UR QL AUTO: ABNORMAL /LPF
IMM GRANULOCYTES # BLD AUTO: 0.02 10*3/MM3 (ref 0–0.05)
IMM GRANULOCYTES NFR BLD AUTO: 0.5 % (ref 0–0.5)
KETONES UR QL STRIP: NEGATIVE
LACTOFERRIN STL QL LA: NEGATIVE
LEUKOCYTE ESTERASE UR QL STRIP.AUTO: ABNORMAL
LYMPHOCYTES # BLD AUTO: 1.49 10*3/MM3 (ref 0.7–3.1)
LYMPHOCYTES NFR BLD AUTO: 34.2 % (ref 19.6–45.3)
MCH RBC QN AUTO: 26.7 PG (ref 26.6–33)
MCHC RBC AUTO-ENTMCNC: 31.4 G/DL (ref 31.5–35.7)
MCV RBC AUTO: 85.1 FL (ref 79–97)
MONOCYTES # BLD AUTO: 0.3 10*3/MM3 (ref 0.1–0.9)
MONOCYTES NFR BLD AUTO: 6.9 % (ref 5–12)
NEUTROPHILS NFR BLD AUTO: 2.45 10*3/MM3 (ref 1.7–7)
NEUTROPHILS NFR BLD AUTO: 56.1 % (ref 42.7–76)
NITRITE UR QL STRIP: NEGATIVE
NRBC BLD AUTO-RTO: 0 /100 WBC (ref 0–0.2)
PH UR STRIP.AUTO: 5.5 [PH] (ref 5–8)
PLATELET # BLD AUTO: 145 10*3/MM3 (ref 140–450)
PMV BLD AUTO: 10.7 FL (ref 6–12)
POTASSIUM SERPL-SCNC: 4.1 MMOL/L (ref 3.5–5.2)
PROT SERPL-MCNC: 7.5 G/DL (ref 6–8.5)
PROT UR QL STRIP: ABNORMAL
RBC # BLD AUTO: 4.57 10*6/MM3 (ref 3.77–5.28)
RBC # UR STRIP: ABNORMAL /HPF
REF LAB TEST METHOD: ABNORMAL
SODIUM SERPL-SCNC: 140 MMOL/L (ref 136–145)
SP GR UR STRIP: >1.03 (ref 1–1.03)
SQUAMOUS #/AREA URNS HPF: ABNORMAL /HPF
UROBILINOGEN UR QL STRIP: ABNORMAL
WBC # UR STRIP: ABNORMAL /HPF
WBC NRBC COR # BLD: 4.36 10*3/MM3 (ref 3.4–10.8)

## 2022-08-08 PROCEDURE — 87040 BLOOD CULTURE FOR BACTERIA: CPT | Performed by: NURSE PRACTITIONER

## 2022-08-08 PROCEDURE — 87086 URINE CULTURE/COLONY COUNT: CPT | Performed by: NURSE PRACTITIONER

## 2022-08-08 PROCEDURE — 80053 COMPREHEN METABOLIC PANEL: CPT | Performed by: NURSE PRACTITIONER

## 2022-08-08 PROCEDURE — 85025 COMPLETE CBC W/AUTO DIFF WBC: CPT | Performed by: NURSE PRACTITIONER

## 2022-08-08 PROCEDURE — 87070 CULTURE OTHR SPECIMN AEROBIC: CPT | Performed by: NURSE PRACTITIONER

## 2022-08-08 PROCEDURE — 87205 SMEAR GRAM STAIN: CPT | Performed by: NURSE PRACTITIONER

## 2022-08-08 PROCEDURE — 81001 URINALYSIS AUTO W/SCOPE: CPT | Performed by: NURSE PRACTITIONER

## 2022-08-08 PROCEDURE — 83630 LACTOFERRIN FECAL (QUAL): CPT | Performed by: NURSE PRACTITIONER

## 2022-08-09 LAB — BACTERIA SPEC AEROBE CULT: NO GROWTH

## 2022-08-11 ENCOUNTER — TELEPHONE (OUTPATIENT)
Dept: CASE MANAGEMENT | Facility: OTHER | Age: 57
End: 2022-08-11

## 2022-08-11 LAB
BACTERIA SPEC AEROBE CULT: NORMAL
BACTERIA SPEC AEROBE CULT: NORMAL
GRAM STN SPEC: NORMAL

## 2022-08-12 ENCOUNTER — TELEPHONE (OUTPATIENT)
Dept: FAMILY MEDICINE CLINIC | Facility: CLINIC | Age: 57
End: 2022-08-12

## 2022-08-12 ENCOUNTER — PATIENT OUTREACH (OUTPATIENT)
Dept: CASE MANAGEMENT | Facility: OTHER | Age: 57
End: 2022-08-12

## 2022-08-12 DIAGNOSIS — E11.65 TYPE 2 DIABETES MELLITUS WITH HYPERGLYCEMIA, WITH LONG-TERM CURRENT USE OF INSULIN: Primary | ICD-10-CM

## 2022-08-12 DIAGNOSIS — Z79.4 TYPE 2 DIABETES MELLITUS WITH HYPERGLYCEMIA, WITH LONG-TERM CURRENT USE OF INSULIN: Primary | ICD-10-CM

## 2022-08-12 NOTE — TELEPHONE ENCOUNTER
Pharmacy Name:  [facility] PHARMACY #    Pharmacy representative name:[facility]    Pharmacy representative phone number: [phone]    What medication are you calling in regards to: GLUCOSE MONITORING SUPPLIES     What question does the pharmacy have: NEEDING DIRECTIONS AND FREQUENCY OF TESTING, AND IS THIS FOR DEXACOM IF IT IS IT MAY NOT BE COVERED BY INSURANCE    Who is the provider that prescribed the medication:EVARISTO MATTHEWS

## 2022-08-12 NOTE — OUTREACH NOTE
AMBULATORY CASE MANAGEMENT NOTE    Name and Relationship of Patient/Support Person: Isha Llanes M - Self    Per referral statuses: psych and hem/onc have not been able to reach pt. I reached pt and gave her contact info of both offices to arrange her own appts. She agreed.    She saw Dr Garcia this week and he gave her new rx for insulin, not sure what type/dose. She will take to [facility]. She does not have meter. I sent rx for that in.    Gave her date/time/addresses of [facility] this month.        CHARLOTTE GOODMAN  Ambulatory Case Management    8/12/2022, 14:33 EDT

## 2022-08-12 NOTE — TELEPHONE ENCOUNTER
I am unsure what this message is in regards to.  Please verify what she wanting.  She sees Dr. Tejada endocrinologist for her diabetes.

## 2022-08-13 LAB — BACTERIA SPEC AEROBE CULT: NORMAL

## 2022-08-16 NOTE — TELEPHONE ENCOUNTER
It looks like we sent her glucose monitor supply kit. They are just wanting to know how often to test blood sugars or does she need to contact her diabetic doctor for that answer? Please advise.

## 2022-08-17 ENCOUNTER — TELEPHONE (OUTPATIENT)
Dept: CASE MANAGEMENT | Facility: OTHER | Age: 57
End: 2022-08-17

## 2022-08-18 ENCOUNTER — PATIENT OUTREACH (OUTPATIENT)
Dept: CASE MANAGEMENT | Facility: OTHER | Age: 57
End: 2022-08-18

## 2022-08-18 ENCOUNTER — TELEPHONE (OUTPATIENT)
Dept: ONCOLOGY | Facility: HOSPITAL | Age: 57
End: 2022-08-18

## 2022-08-18 DIAGNOSIS — S81.809D NON-HEALING WOUND OF LOWER EXTREMITY, SUBSEQUENT ENCOUNTER: ICD-10-CM

## 2022-08-18 DIAGNOSIS — F32.A DEPRESSION, UNSPECIFIED DEPRESSION TYPE: ICD-10-CM

## 2022-08-18 DIAGNOSIS — Z79.4 TYPE 2 DIABETES MELLITUS WITH HYPERGLYCEMIA, WITH LONG-TERM CURRENT USE OF INSULIN: Primary | ICD-10-CM

## 2022-08-18 DIAGNOSIS — E11.65 TYPE 2 DIABETES MELLITUS WITH HYPERGLYCEMIA, WITH LONG-TERM CURRENT USE OF INSULIN: Primary | ICD-10-CM

## 2022-08-18 NOTE — OUTREACH NOTE
"AMBULATORY CASE MANAGEMENT NOTE    Name and Relationship of Patient/Support Person:  -     Daughter working on getting pt food stamps. Pt has \"just enough\" to get her by. I offered SW and/or resources for more immediate food resources. she declined, saying she already has them.    reminded pt pf date/toime/loaction of CTs. She had not called Dr Payne's office yet for appt. I gave her his office number. She had a missed call from Dr Boudreaux's office, she thinks. Gave her his number as well.    Pt stated she has her CGM but has not applied it. Home health RN was supposed to come out yesterday and help. However, no one came. I called [facility] who said pt confirmed date/time of visit the day prior but when nurse showed up, no one answered door. Note on door said \"be back soon\". Nurse waited for 30 min and pt never came back. Pt said this was not true, she did not take 30 min. She was going to call them. I already called and asked that nurse try to work pt into their schedule.    Made f/u with pcp.      CHARLOTTE GOODMAN  Ambulatory Case Management    8/18/2022, 09:08 EDT  "

## 2022-08-18 NOTE — TELEPHONE ENCOUNTER
Hub staff attempted to follow warm transfer process and was unsuccessful     Caller: Isha Llanes    Relationship to patient: Self    Best call back number: [phone]    Patient is needing: CALLING BACK TO SCHEDULE FROM A REFERRAL

## 2022-08-25 ENCOUNTER — PATIENT OUTREACH (OUTPATIENT)
Dept: CASE MANAGEMENT | Facility: OTHER | Age: 57
End: 2022-08-25

## 2022-08-25 DIAGNOSIS — Z79.4 TYPE 2 DIABETES MELLITUS WITH HYPERGLYCEMIA, WITH LONG-TERM CURRENT USE OF INSULIN: Primary | ICD-10-CM

## 2022-08-25 DIAGNOSIS — E11.65 TYPE 2 DIABETES MELLITUS WITH HYPERGLYCEMIA, WITH LONG-TERM CURRENT USE OF INSULIN: Primary | ICD-10-CM

## 2022-08-25 NOTE — OUTREACH NOTE
AMBULATORY CASE MANAGEMENT NOTE    Name and Relationship of Patient/Support Person:  -     Gave pt contact info for oncologist.  Also reached out to  for financial help with pt's housing.      CHARLOTTE GOODMAN  Ambulatory Case Management    8/25/2022, 15:36 EDT

## 2022-08-26 ENCOUNTER — TELEPHONE (OUTPATIENT)
Dept: FAMILY MEDICINE CLINIC | Facility: CLINIC | Age: 57
End: 2022-08-26

## 2022-08-27 DIAGNOSIS — J44.9 CHRONIC OBSTRUCTIVE PULMONARY DISEASE, UNSPECIFIED COPD TYPE: ICD-10-CM

## 2022-08-29 ENCOUNTER — DOCUMENTATION (OUTPATIENT)
Dept: FAMILY MEDICINE CLINIC | Facility: CLINIC | Age: 57
End: 2022-08-29

## 2022-08-30 ENCOUNTER — HOSPITAL ENCOUNTER (OUTPATIENT)
Dept: CT IMAGING | Facility: HOSPITAL | Age: 57
Discharge: HOME | End: 2022-08-30
Admitting: NURSE PRACTITIONER

## 2022-08-30 ENCOUNTER — PATIENT OUTREACH (OUTPATIENT)
Dept: CASE MANAGEMENT | Facility: OTHER | Age: 57
End: 2022-08-30

## 2022-08-30 DIAGNOSIS — R22.1 NECK NODULE: ICD-10-CM

## 2022-08-30 DIAGNOSIS — Z79.4 TYPE 2 DIABETES MELLITUS WITH HYPERGLYCEMIA, WITH LONG-TERM CURRENT USE OF INSULIN: Primary | ICD-10-CM

## 2022-08-30 DIAGNOSIS — F17.210 CIGARETTE NICOTINE DEPENDENCE WITHOUT COMPLICATION: ICD-10-CM

## 2022-08-30 DIAGNOSIS — R10.31 RIGHT LOWER QUADRANT PAIN: ICD-10-CM

## 2022-08-30 DIAGNOSIS — F32.A DEPRESSION, UNSPECIFIED DEPRESSION TYPE: ICD-10-CM

## 2022-08-30 DIAGNOSIS — E11.65 TYPE 2 DIABETES MELLITUS WITH HYPERGLYCEMIA, WITH LONG-TERM CURRENT USE OF INSULIN: Primary | ICD-10-CM

## 2022-08-30 PROCEDURE — 71271 CT THORAX LUNG CANCER SCR C-: CPT

## 2022-08-30 PROCEDURE — 74176 CT ABD & PELVIS W/O CONTRAST: CPT

## 2022-08-30 PROCEDURE — 70490 CT SOFT TISSUE NECK W/O DYE: CPT

## 2022-08-30 NOTE — OUTREACH NOTE
[facility] End of Month Documentation    This Chronic Medical Management Care Plan for Isha Llanes, 56 y.o. female, has been monitored and managed and a new plan of care implemented for the month of August.  A cumulative time of 153  minutes was spent on this patient record this month, including phone call with patient; chart review; electronic communication with primary care provider; phone call with other providers.    Regarding the patient's problems: has Cancer associated pain; Presence of intrathecal pump; Chronic low back pain with bilateral sciatica; Sacroiliac inflammation (HCC); Chronic pain syndrome; Pelvic pain; Aortic stenosis, severe; Dyspnea on effort; Other pulmonary embolism without acute cor pulmonale (HCC); Nonrheumatic aortic valve stenosis; Hip pain; Anxiety disorder; Allergic rhinitis due to allergen; Arthritis; Asthma; Kidney disease; CHF (congestive heart failure) (HCC); Chronic obstructive pulmonary disease (HCC); Diabetes mellitus, type 2 (HCC); Depression; GERD (gastroesophageal reflux disease); S/P TAVR (transcatheter aortic valve replacement); Limited mobility; Venous hypertension of both lower extremities; Nicotine dependence; Mitral valve prolapse; Lupus (systemic lupus erythematosus) (HCC); Long term current use of anticoagulant therapy; History of Hodgkin's lymphoma; Hepatic steatosis; Heart murmur; CAD (coronary artery disease); Non-healing wound of lower extremity, subsequent encounter; MSSA (methicillin susceptible Staphylococcus aureus) infection; Sepsis ([facility]); Change in bowel habits; Nausea; Venous stasis ulcer of right calf (HCC); Venous stasis ulcer of left calf (HCC); and Noncompliance on their problem list., the following items were addressed: medical records; medications; referrals to community service providers and any changes can be found within the plan section of the note.  A detailed listing of time spent for chronic care management is tracked within each outreach  encounter.  Current medications include:  has a current medication list which includes the following prescription(s): proair hfa, acetaminophen, b-d uf iii mini pen needles, bacitracin, bacitracin, clopidogrel, continuous glucose monitor sup, ace bandage self-adhering, epinephrine, famotidine, flovent hfa, fluconazole, freestyle lite, furosemide, gabapentin, hydromorphone, hydroxyzine, insulin lispro, ipratropium-albuterol, freestyle, lantus solostar, pantoprazole, plenvu, probiotic (lactobacillus), saccharomyces boulardii, silver sulfadiazine, clenpiq, sodium chloride 0.9 % solution with HYDROmorphone (PF) 50 MG/5ML solution 0.2 mg/mL, sodium chloride, and triamcinolone, and the following Facility-Administered Medications: sterile water. and the patient is reported to be patient is compliant with medication protocol,  Medications are reported to be non-effective in controlling symptoms and changes have been made to the medication protocol, pt has not applied her CGM.  Regarding these diagnoses, referrals were made to the following provider(s):  Psych and hematology/oncology.  All notes on chart for PCP to review.    The patient was monitored remotely for activity level; medications; blood glucose.    The patient's physical needs include:  needs assistance with ADLs; physical healthcare; physician referral.     The patient's mental support needs include:  not applicable    The patient's cognitive support needs include:  not applicable    The patient's psychosocial support needs include:  need for increased support    The patient's functional needs include: physical healthcare; physician referral    The patient's environmental needs include:  no access to transportation, uses TACK    Care Plan overall comments:  No data recorded    Refer to previous outreach notes for more information on the areas listed above.    Monthly Billing Diagnoses  (E11.65,  Z79.4) Type 2 diabetes mellitus with hyperglycemia, with long-term  current use of insulin ([facility])    (F32.A) Depression, unspecified depression type    Medications   · Medications have been reconciled    Care Plan progress this month:      Recently Modified Care Plans Updates made since 7/30/2022 12:00 AM    No recently modified care plans.              Instructions   · Patient was provided an electronic copy of care plan  · CCM services were explained and offered and patient has accepted these services.  · Patient has given their written consent to receive CCM services and understands that this includes the authorization of electronic communication of medical information with the other treating providers.  · Patient understands that they may stop CCM services at any time and these changes will be effective at the end of the calendar month and will effectively revocate the agreement of CCM services.  · Patient understands that only one practitioner can furnish and be paid for CCM services during one calendar month.  Patient also understands that there may be co-payment or deductible fees in association with CCM services.  · Patient will continue with at least monthly follow-up calls with the Nurse Navigator.    CHARLOTTE GOODMAN  Ambulatory Case Management    8/30/2022, 10:04 EDT

## 2022-08-31 ENCOUNTER — TRANSCRIBE ORDERS (OUTPATIENT)
Dept: ADMINISTRATIVE | Facility: HOSPITAL | Age: 57
End: 2022-08-31

## 2022-08-31 ENCOUNTER — APPOINTMENT (OUTPATIENT)
Dept: CT IMAGING | Facility: HOSPITAL | Age: 57
End: 2022-08-31

## 2022-08-31 ENCOUNTER — TELEPHONE (OUTPATIENT)
Dept: FAMILY MEDICINE CLINIC | Facility: CLINIC | Age: 57
End: 2022-08-31

## 2022-08-31 ENCOUNTER — TELEPHONE (OUTPATIENT)
Dept: ONCOLOGY | Facility: OTHER | Age: 57
End: 2022-08-31

## 2022-08-31 DIAGNOSIS — L03.119 CELLULITIS OF LOWER EXTREMITY, UNSPECIFIED LATERALITY: Primary | ICD-10-CM

## 2022-08-31 DIAGNOSIS — L85.3 DRY SKIN: ICD-10-CM

## 2022-08-31 DIAGNOSIS — R23.4 CRACKED SKIN: ICD-10-CM

## 2022-08-31 NOTE — TELEPHONE ENCOUNTER
Hub staff attempted to follow warm transfer process and was unsuccessful     Caller: Dalia Watson    Relationship to patient: Emergency Contact    Best call back number: [phone]    Patient is needing: PATIENT DAUGHTER IS REQUESTING TO SPEAK A NURSE REGARDING ONCOLOGY APPOINTMENT       PLEASE ADVISE      DALIA SEVILLABS IS NOT ON [facility] VERBAL

## 2022-08-31 NOTE — OUTREACH NOTE
AMBULATORY CASE MANAGEMENT NOTE    Name and Relationship of Patient/Support Person:  -     Pt called office while at [facility] stating she was supposed to have a CT scan done. However, upon chart review, today's was canceled. Pt was called x3 to notify, no answer. Pt also had all 3 CT's done yesterday, so no need for any more or an appt. Pt has not made her hem/onc appt. I left detailed message on pt's voicemail stating she needed to arrange appt.    I also called daughter and relayed this info. She agreed to make appt.    [facility] Interim Update    Dr Haywood agreed (at Dr Payne's office) agreed to see pt/. Updated pt and daughter.  Pt stated she wishes for her daughter to not get test results anymore. I asked that she update her YUMIKO ASAP. She agreed.  Reviewed other appts.        CHARLOTTE GOODMAN  Ambulatory Case Management    8/31/2022, 10:48 EDT

## 2022-08-31 NOTE — TELEPHONE ENCOUNTER
PT DAUGHTER CALLED TO SET UP N/P APPT, TRIED TO W/T CLICKED BACK OVER TO TELL HER TO HOLD AND DISCONNECTED BOTH CALLS.     DID TRY TO CALL HER BACK -[phone], NO ANSWER

## 2022-08-31 NOTE — TELEPHONE ENCOUNTER
Daughter stated pt was insisting an appt with hem/onc.  Called and talk to Althea with Dr Payne's office. Declined seeing pt due to lack of clinical finding from CT's on 8/30/2022. Agreed to possibly see pt if we can get records that showed she ever had a positive pathology report.  Called daughter and explained. Said pt was seen by Dr Joseph about 10 years ago. Office no longer open. unsure if we have records. Consulted pcp who said to re-refer to Dr Mancuso. Will update referral.

## 2022-09-02 ENCOUNTER — TELEPHONE (OUTPATIENT)
Dept: FAMILY MEDICINE CLINIC | Facility: CLINIC | Age: 57
End: 2022-09-02

## 2022-09-02 NOTE — TELEPHONE ENCOUNTER
Caller: Isha Llanes    Relationship to patient: Self    Best call back number: [phone]    Patient is needing: PATIENT HAS BEEN RELEASED FROM HOME HEALTH BECAUSE INSURANCE STOPPED COVERAGE, SHE IS STILL NEEDING THE PHYSICAL THERAPY.  SHE HAS AN APPOINTMENT ON 09/16/2022.  SHE WOULD LIKE TO BE SEEN SOONER IF POSSIBLE.  SHE NEEDS TO KNOW WHAT THE NEXT STEP SHOULD BE TO HELP HER GET OUT OF THE WHEELCHAIR PLEASE CALL AND ADVISE

## 2022-09-02 NOTE — TELEPHONE ENCOUNTER
Provider: ALINE MATTHEWS    Caller: LEESA    Relationship to Patient: HOME HEALTH CARE    Reason for Call: LEESA FROM [facility] STATED THEY ARE GOING TO DISCHARGE PATIENT. SHE HAS BEEN UNABLE TO CONTACT HER AND SHE IS NOT HOME FOR APPOINTMENTS. SHE STATED THERE IS NO FOLLOW-THROUGH WITH CARE GIVEN FOR WOUNDS. SHE STATED THE PATIENT DOES NOT KEEP APPOINTMENTS EVEN AFTER CONFIRMING DATE AND TIME IS OKAY.

## 2022-09-05 ENCOUNTER — HOSPITAL ENCOUNTER (EMERGENCY)
Facility: HOSPITAL | Age: 57
Discharge: HOME | End: 2022-09-05
Attending: EMERGENCY MEDICINE | Admitting: EMERGENCY MEDICINE

## 2022-09-05 ENCOUNTER — APPOINTMENT (OUTPATIENT)
Dept: GENERAL RADIOLOGY | Facility: HOSPITAL | Age: 57
End: 2022-09-05

## 2022-09-05 VITALS
SYSTOLIC BLOOD PRESSURE: 109 MMHG | WEIGHT: 160.5 LBS | HEART RATE: 89 BPM | RESPIRATION RATE: 18 BRPM | DIASTOLIC BLOOD PRESSURE: 69 MMHG | BODY MASS INDEX: 25.79 KG/M2 | OXYGEN SATURATION: 97 % | TEMPERATURE: 98.6 F | HEIGHT: 66 IN

## 2022-09-05 DIAGNOSIS — Z20.822 SUSPECTED COVID-19 VIRUS INFECTION: Primary | ICD-10-CM

## 2022-09-05 DIAGNOSIS — B34.9 VIRAL ILLNESS: ICD-10-CM

## 2022-09-05 LAB
ALBUMIN SERPL-MCNC: 4 G/DL (ref 3.5–5.2)
ALBUMIN/GLOB SERPL: 1.1 G/DL
ALP SERPL-CCNC: 99 U/L (ref 39–117)
ALT SERPL W P-5'-P-CCNC: 8 U/L (ref 1–33)
ANION GAP SERPL CALCULATED.3IONS-SCNC: 9.8 MMOL/L (ref 5–15)
AST SERPL-CCNC: 11 U/L (ref 1–32)
BASOPHILS # BLD AUTO: 0 10*3/MM3 (ref 0–0.2)
BASOPHILS NFR BLD AUTO: 0 % (ref 0–1.5)
BILIRUB SERPL-MCNC: 0.3 MG/DL (ref 0–1.2)
BUN SERPL-MCNC: 13 MG/DL (ref 6–20)
BUN/CREAT SERPL: 27.1 (ref 7–25)
CALCIUM SPEC-SCNC: 9.3 MG/DL (ref 8.6–10.5)
CHLORIDE SERPL-SCNC: 98 MMOL/L (ref 98–107)
CO2 SERPL-SCNC: 29.2 MMOL/L (ref 22–29)
CREAT SERPL-MCNC: 0.48 MG/DL (ref 0.57–1)
DEPRECATED RDW RBC AUTO: 42.8 FL (ref 37–54)
EGFRCR SERPLBLD CKD-EPI 2021: 111.3 ML/MIN/1.73
EOSINOPHIL # BLD AUTO: 0.1 10*3/MM3 (ref 0–0.4)
EOSINOPHIL NFR BLD AUTO: 1.6 % (ref 0.3–6.2)
ERYTHROCYTE [DISTWIDTH] IN BLOOD BY AUTOMATED COUNT: 14.1 % (ref 12.3–15.4)
GLOBULIN UR ELPH-MCNC: 3.7 GM/DL
GLUCOSE SERPL-MCNC: 127 MG/DL (ref 65–99)
HCT VFR BLD AUTO: 40.8 % (ref 34–46.6)
HGB BLD-MCNC: 13.2 G/DL (ref 12–15.9)
HOLD SPECIMEN: NORMAL
IMM GRANULOCYTES # BLD AUTO: 0.03 10*3/MM3 (ref 0–0.05)
IMM GRANULOCYTES NFR BLD AUTO: 0.5 % (ref 0–0.5)
LIPASE SERPL-CCNC: 17 U/L (ref 13–60)
LYMPHOCYTES # BLD AUTO: 0.73 10*3/MM3 (ref 0.7–3.1)
LYMPHOCYTES NFR BLD AUTO: 11.4 % (ref 19.6–45.3)
MAGNESIUM SERPL-MCNC: 1.9 MG/DL (ref 1.6–2.6)
MCH RBC QN AUTO: 26.9 PG (ref 26.6–33)
MCHC RBC AUTO-ENTMCNC: 32.4 G/DL (ref 31.5–35.7)
MCV RBC AUTO: 83.1 FL (ref 79–97)
MONOCYTES # BLD AUTO: 0.33 10*3/MM3 (ref 0.1–0.9)
MONOCYTES NFR BLD AUTO: 5.2 % (ref 5–12)
NEUTROPHILS NFR BLD AUTO: 5.2 10*3/MM3 (ref 1.7–7)
NEUTROPHILS NFR BLD AUTO: 81.3 % (ref 42.7–76)
NRBC BLD AUTO-RTO: 0 /100 WBC (ref 0–0.2)
NT-PROBNP SERPL-MCNC: 147.3 PG/ML (ref 0–900)
PLATELET # BLD AUTO: 187 10*3/MM3 (ref 140–450)
PMV BLD AUTO: 9.9 FL (ref 6–12)
POTASSIUM SERPL-SCNC: 4.1 MMOL/L (ref 3.5–5.2)
PROT SERPL-MCNC: 7.7 G/DL (ref 6–8.5)
RBC # BLD AUTO: 4.91 10*6/MM3 (ref 3.77–5.28)
SODIUM SERPL-SCNC: 137 MMOL/L (ref 136–145)
TROPONIN I SERPL-MCNC: 0 NG/ML (ref 0–0.08)
WBC NRBC COR # BLD: 6.39 10*3/MM3 (ref 3.4–10.8)
WHOLE BLOOD HOLD COAG: NORMAL
WHOLE BLOOD HOLD SPECIMEN: NORMAL

## 2022-09-05 PROCEDURE — 84484 ASSAY OF TROPONIN QUANT: CPT

## 2022-09-05 PROCEDURE — 83735 ASSAY OF MAGNESIUM: CPT

## 2022-09-05 PROCEDURE — 36415 COLL VENOUS BLD VENIPUNCTURE: CPT

## 2022-09-05 PROCEDURE — 83690 ASSAY OF LIPASE: CPT

## 2022-09-05 PROCEDURE — 83880 ASSAY OF NATRIURETIC PEPTIDE: CPT

## 2022-09-05 PROCEDURE — 80053 COMPREHEN METABOLIC PANEL: CPT

## 2022-09-05 PROCEDURE — 63710000001 ONDANSETRON ODT 4 MG TABLET DISPERSIBLE: Performed by: EMERGENCY MEDICINE

## 2022-09-05 PROCEDURE — 85025 COMPLETE CBC W/AUTO DIFF WBC: CPT

## 2022-09-05 PROCEDURE — 71045 X-RAY EXAM CHEST 1 VIEW: CPT

## 2022-09-05 PROCEDURE — U0004 COV-19 TEST NON-CDC HGH THRU: HCPCS

## 2022-09-05 PROCEDURE — C9803 HOPD COVID-19 SPEC COLLECT: HCPCS | Performed by: EMERGENCY MEDICINE

## 2022-09-05 PROCEDURE — 93005 ELECTROCARDIOGRAM TRACING: CPT

## 2022-09-05 PROCEDURE — 93005 ELECTROCARDIOGRAM TRACING: CPT | Performed by: EMERGENCY MEDICINE

## 2022-09-05 PROCEDURE — 99284 EMERGENCY DEPT VISIT MOD MDM: CPT

## 2022-09-05 RX ORDER — ALPRAZOLAM 0.25 MG/1
0.5 TABLET ORAL ONCE
Status: COMPLETED | OUTPATIENT
Start: 2022-09-05 | End: 2022-09-05

## 2022-09-05 RX ORDER — ASPIRIN 81 MG/1
324 TABLET, CHEWABLE ORAL ONCE
Status: COMPLETED | OUTPATIENT
Start: 2022-09-05 | End: 2022-09-05

## 2022-09-05 RX ORDER — ACETAMINOPHEN 325 MG/1
975 TABLET ORAL ONCE
Status: COMPLETED | OUTPATIENT
Start: 2022-09-05 | End: 2022-09-05

## 2022-09-05 RX ORDER — ALPRAZOLAM 0.5 MG/1
0.5 TABLET ORAL 2 TIMES DAILY PRN
Qty: 6 TABLET | Refills: 0 | Status: SHIPPED | OUTPATIENT
Start: 2022-09-05 | End: 2022-09-13

## 2022-09-05 RX ORDER — ONDANSETRON 4 MG/1
4 TABLET, ORALLY DISINTEGRATING ORAL ONCE
Status: COMPLETED | OUTPATIENT
Start: 2022-09-05 | End: 2022-09-05

## 2022-09-05 RX ORDER — ONDANSETRON 4 MG/1
4 TABLET, ORALLY DISINTEGRATING ORAL 4 TIMES DAILY PRN
Qty: 9 TABLET | Refills: 0 | Status: SHIPPED | OUTPATIENT
Start: 2022-09-05 | End: 2022-09-20 | Stop reason: SDUPTHER

## 2022-09-05 RX ORDER — SODIUM CHLORIDE 0.9 % (FLUSH) 0.9 %
10 SYRINGE (ML) INJECTION AS NEEDED
Status: DISCONTINUED | OUTPATIENT
Start: 2022-09-05 | End: 2022-09-06 | Stop reason: HOSPADM

## 2022-09-05 RX ADMIN — ASPIRIN 81 MG CHEWABLE TABLET 324 MG: 81 TABLET CHEWABLE at 22:01

## 2022-09-05 RX ADMIN — ONDANSETRON 4 MG: 4 TABLET, ORALLY DISINTEGRATING ORAL at 23:07

## 2022-09-05 RX ADMIN — ALPRAZOLAM 0.5 MG: 0.25 TABLET ORAL at 23:27

## 2022-09-05 RX ADMIN — ACETAMINOPHEN 975 MG: 325 TABLET ORAL at 22:00

## 2022-09-06 ENCOUNTER — PATIENT OUTREACH (OUTPATIENT)
Dept: CASE MANAGEMENT | Facility: OTHER | Age: 57
End: 2022-09-06

## 2022-09-06 ENCOUNTER — TELEPHONE (OUTPATIENT)
Dept: FAMILY MEDICINE CLINIC | Facility: CLINIC | Age: 57
End: 2022-09-06

## 2022-09-06 DIAGNOSIS — F32.A DEPRESSION, UNSPECIFIED DEPRESSION TYPE: ICD-10-CM

## 2022-09-06 DIAGNOSIS — F17.210 CIGARETTE NICOTINE DEPENDENCE WITHOUT COMPLICATION: ICD-10-CM

## 2022-09-06 DIAGNOSIS — E63.9 POOR NUTRITION: ICD-10-CM

## 2022-09-06 DIAGNOSIS — Z79.4 TYPE 2 DIABETES MELLITUS WITH HYPERGLYCEMIA, WITH LONG-TERM CURRENT USE OF INSULIN: Primary | ICD-10-CM

## 2022-09-06 DIAGNOSIS — F41.1 GENERALIZED ANXIETY DISORDER: ICD-10-CM

## 2022-09-06 DIAGNOSIS — Z79.01 LONG TERM CURRENT USE OF ANTICOAGULANT THERAPY: ICD-10-CM

## 2022-09-06 DIAGNOSIS — Z74.09 LIMITED MOBILITY: ICD-10-CM

## 2022-09-06 DIAGNOSIS — J44.9 CHRONIC OBSTRUCTIVE PULMONARY DISEASE, UNSPECIFIED COPD TYPE: ICD-10-CM

## 2022-09-06 DIAGNOSIS — L97.229 VENOUS STASIS ULCER OF LEFT CALF, UNSPECIFIED ULCER STAGE, UNSPECIFIED WHETHER VARICOSE VEINS PRESENT: ICD-10-CM

## 2022-09-06 DIAGNOSIS — I35.0 AORTIC STENOSIS, SEVERE: ICD-10-CM

## 2022-09-06 DIAGNOSIS — I83.022 VENOUS STASIS ULCER OF LEFT CALF, UNSPECIFIED ULCER STAGE, UNSPECIFIED WHETHER VARICOSE VEINS PRESENT: ICD-10-CM

## 2022-09-06 DIAGNOSIS — M25.551 RIGHT HIP PAIN: ICD-10-CM

## 2022-09-06 DIAGNOSIS — I83.012 VENOUS STASIS ULCER OF RIGHT CALF, UNSPECIFIED ULCER STAGE, UNSPECIFIED WHETHER VARICOSE VEINS PRESENT: ICD-10-CM

## 2022-09-06 DIAGNOSIS — E11.65 TYPE 2 DIABETES MELLITUS WITH HYPERGLYCEMIA, WITH LONG-TERM CURRENT USE OF INSULIN: Primary | ICD-10-CM

## 2022-09-06 DIAGNOSIS — L03.119 CELLULITIS OF LOWER EXTREMITY, UNSPECIFIED LATERALITY: ICD-10-CM

## 2022-09-06 DIAGNOSIS — L85.3 DRY SKIN: ICD-10-CM

## 2022-09-06 DIAGNOSIS — L97.219 VENOUS STASIS ULCER OF RIGHT CALF, UNSPECIFIED ULCER STAGE, UNSPECIFIED WHETHER VARICOSE VEINS PRESENT: ICD-10-CM

## 2022-09-06 LAB
QT INTERVAL: 381 MS
QT INTERVAL: 391 MS
SARS-COV-2 RNA PNL SPEC NAA+PROBE: NOT DETECTED

## 2022-09-06 NOTE — TELEPHONE ENCOUNTER
Caller: Isha Llanes    Relationship: Self    Best call back number: [phone]    What is the best time to reach you: ANY TIME     Who are you requesting to speak with (clinical staff, provider,  specific staff member):CLINICAL         What was the call regarding: PATIENT STATES THAT HOME HEALTH NO LONGER COMES TO HER HOME. THEREFORE, PATIENT HAS NO SUPPLIES FOR HER LEGS AND IS REQUESTING SOME TO BE ORDERED  SUPPLIES NEEDED ARE ACE BANDAGES, KERLIX GAUZE BANDAGES, 4 X 4'S, PLASTIC STICK THAT IS SPONGY STATES THAT IT LOOKED LIKE A BAND-AID WITH SPONGE IN THE MIDDLE A BIG SQUARE, A BOX OF LARGE GLOVES AND NEEDS THE ORDER SENT TO   [employer] AND TO REQUEST THAT THE COMPANY SHIPS THE SUPPLIES TO THE PATIENT AT [address] [place] [address]  [address] [place] [address]    PATIENT ALSO STATES THAT IF THERE IS ANY TYPE OF OINTMENT THAT SHE CAN USE ON HER LEGS THAT SHE HASN'T USED YET TO PLEASE PRESCRIBE HER SOMETHING TO THE [facility] IN [place] AND PHONE NUMBER [phone]    PATIENT WAS SEEN AT ER AT [facility] ON 09/05/2022 FOR POSSIBLE COVID. HOWEVER, PATIENT IS STILL AWAITING THE TEST RESULTS AND PATIENT IS WANTING TO KNOW IF PCP CAN CALL HER WITH THE RESULTS.     Do you require a callback: YES

## 2022-09-06 NOTE — OUTREACH NOTE
Patient Outreach    MSW has attempted to contact patient x4 again with no calls back. MSW to remove from case team, but available if needed in the future.    ANA M RUIZ -   Ambulatory Case Management    9/6/2022, 12:39 EDT

## 2022-09-06 NOTE — ED PROVIDER NOTES
Time: 9:28 PM EDT  Arrived by: private car  Chief Complaint: chest pain  History provided by: patient  History is limited by: N/A     History of Present Illness    Patient is a 56 y.o. year old female who presents to the emergency department via EMS with chest tightness radiating through to her back that has been constant all day. Pt also reports fatigue, diarrhea, nausea, abdominal pain, congestion, cough, and shortness of breath all onset 2 days ago. Pt states that she has been exposed to COVID via her granddaughter. Pt states that she is not COVID vaccinated.   Pt has a history of cancer, but is not currently being treated. Pt states that she takes xanax for anxiety.       History provided by:  Patient   used: No        Similar Symptoms Previously: N/A  Recently seen: N/A      Patient Care Team  Primary Care Provider: Vidal Luevano APRN    Past Medical History:     Allergies   Allergen Reactions   • Amoxicillin Shortness Of Breath   • Ceclor [Cefaclor] Shortness Of Breath   • Penicillins Shortness Of Breath   • Sulfa Antibiotics Rash   • Valium [Diazepam] Mental Status Change   • Ambien [Zolpidem] Unknown - Low Severity   • Aspirin GI Intolerance   • Ativan [Lorazepam] Unknown - Low Severity   • Benadryl [Diphenhydramine] Unknown - Low Severity   • Biaxin [Clarithromycin] Unknown - Low Severity   • Cefazolin Unknown - Low Severity   • Cephalexin Unknown - Low Severity   • Cephalosporins Unknown - Low Severity   • Clindamycin Unknown - Low Severity   • Compazine [Prochlorperazine Edisylate] Rash   • Contrast Dye Other (See Comments)     Caused pain in her arm   • Doxycycline Rash   • Eggs Or Egg-Derived Products Unknown - Low Severity   • Nsaids Unknown - Low Severity   • Phenergan [Promethazine Hcl] Rash   • Promethazine Unknown - Low Severity   • Vancomycin Itching     Past Medical History:   Diagnosis Date   • Anxiety     NO CURRENT MEDICATION   • Aortic stenosis, severe    • Arthritis     • Asthma    • Back pain    • Blood clotting disorder (HCC)    • Cancer associated pain    • Chronic low back pain with sciatica    • Diabetes mellitus (HCC)     TYPE 2   • Dyspnea on effort    • GERD (gastroesophageal reflux disease)    • H/O lumpectomy    • H/O: hysterectomy    • Hiatal hernia    • History of degenerative disc disease    • History of kidney stones    • History of pulmonary embolus (PE)    • History of transfusion    • Hodgkin's lymphoma (HCC)    • Immobility    • Lupus (HCC)    • Mitral valve prolapse    • Pelvic pain    • Peripheral neuropathy    • Personal history of DVT (deep vein thrombosis)    • Presence of intrathecal pump    • Sacroiliac joint disease    • SI (sacroiliac) joint inflammation (HCC)    • Venous insufficiency      Past Surgical History:   Procedure Laterality Date   • BACK SURGERY      SPINAL FUSION    • BLADDER REPAIR     • CARDIAC CATHETERIZATION N/A 3/6/2019    Procedure: Valvuloplasty;  Surgeon: Wayne Johnson MD;  Location: [facility] INVASIVE LOCATION;  Service: Cardiology   • CARDIAC VALVE REPLACEMENT  2021   • CHOLECYSTECTOMY     • COLONOSCOPY N/A 12/29/2021    Procedure: COLONOSCOPY;  Surgeon: Karine Orlando MD;  Location: [facility] ENDOSCOPY;  Service: Gastroenterology;  Laterality: N/A;  POOR PREP   • COLONOSCOPY N/A 4/13/2022    Procedure: COLONOSCOPY WITH POLYPECTOMY;  Surgeon: Karine Orlando MD;  Location: [facility] ENDOSCOPY;  Service: Gastroenterology;  Laterality: N/A;  COLON POLYP, HEMORRHOIDS, POOR PREP   • ENDOSCOPY N/A 12/29/2021    Procedure: ESOPHAGOGASTRODUODENOSCOPY;  Surgeon: Karine Orlando MD;  Location: [facility] ENDOSCOPY;  Service: Gastroenterology;  Laterality: N/A;  ESOPHAGITIS, GASTRITIS, HIATAL HERNIA   • HYSTERECTOMY     • LYMPHADENECTOMY     • PACEMAKER IMPLANTATION     • PAIN PUMP INSERTION/REVISION N/A 10/18/2019    Procedure: PAIN PUMP INSERTION [facility] 10-18-19 [place];  Surgeon: Horace Rios MD;   Location:  [facility];  Service: Pain Management   • PAIN PUMP INSERTION/REVISION N/A 11/23/2020    Procedure: pain pump removal;  Surgeon: Horace Rios MD;  Location:  [facility];  Service: Pain Management;  Laterality: N/A;   • TONSILLECTOMY     • TUBAL ABDOMINAL LIGATION     • TUMOR REMOVAL       Family History   Problem Relation Age of Onset   • Pancreatic cancer Sister    • Pancreatic cancer Paternal Grandmother    • Malig Hyperthermia Neg Hx        Home Medications:  Prior to Admission medications    Medication Sig Start Date End Date Taking? Authorizing Provider   ProAir  (90 Base) MCG/ACT inhaler Inhale 2 puffs Every 4 (Four) Hours As Needed for Wheezing. 8/29/22   Vidal Luevano APRN   acetaminophen (TYLENOL) 500 MG tablet Take 1,000-1,500 mg by mouth Every 6 (Six) Hours As Needed for Mild Pain .    ProviderNette MD B-D UF III MINI PEN NEEDLES 31G X 5 MM misc  9/25/21   Nette Jiménez MD   bacitracin 500 UNIT/GM ointment Apply 1 application topically to the appropriate area as directed 2 (Two) Times a Day. 5/4/22   Vidal Luevano APRN   bacitracin 500 UNIT/GM ointment  4/6/22   Nette Jiménez MD   clopidogrel (Plavix) 75 MG tablet Take 1 tablet by mouth Daily. 3/25/22   Vidal Luevano APRN   Continuous Glucose Monitor Sup kit 1 kit Continuous. One continuous glucose meter, and supplies needed. Give one month worth of supplies, with 3 refills. ICD-10: e11.9 8/12/22   Vidal Luevano APRN   Elastic Bandages & Supports (ACE Bandage Self-Adhering) misc 1 each Daily. 8/1/21   Damari Cornejo PA-C   EPINEPHrine (EPIPEN) 0.3 MG/0.3ML solution auto-injector injection TAKE AS DIRECTED INCASE OF ALLERGIC REACTION. 4/5/22   Nette Jiménez MD   famotidine (PEPCID) 20 MG tablet Take 1 tablet by mouth At Night As Needed for Heartburn. 7/11/22   Sobeida Espinosa APRN   Flovent  MCG/ACT inhaler inhale 2 puffs by mouth twice daily  Patient taking  differently: Inhale 1 puff 2 (Two) Times a Day. 7/27/21   Damari Cornejo PA-C   fluconazole (DIFLUCAN) 150 MG tablet Take 1 tablet by mouth 1 Time for 1 dose. repeat in 3 days 7/27/22   Vidal Luevano APRN   FREESTYLE LITE test strip by Other route 4 (Four) Times a Day. Use to test blood sugar 4 times daily 5/12/22   Nette Jiménez MD   furosemide (LASIX) 40 MG tablet Take 1 tablet by mouth Daily. 3/25/22   Vidal Luevano APRN   gabapentin (NEURONTIN) 300 MG capsule TAKE ONE CAPSULE BY MOUTH THREE TIMES DAILY  Patient taking differently: Take 300 mg by mouth 4 (Four) Times a Day. 2/22/21   Horace Rios MD   HYDROmorphone (DILAUDID) 1 mg/mL solution Infuse  into a venous catheter Dose Adjusted By Provider As Needed. PER PAIN PUMP    Nette Jiménez MD   hydrOXYzine (ATARAX) 50 MG tablet Take 50 mg by mouth Every 6 (Six) Hours As Needed. 6/29/21   Nette Jiménez MD   Insulin Lispro (ADMELOG SOLOSTAR) 100 UNIT/ML injection pen SLIDING SCALE R/T BS. Takes 2-12 units tid 3/25/22   Vidal Luevano APRN   ipratropium-albuterol (DUO-NEB) 0.5-2.5 mg/3 ml nebulizer Inhale 1 ampule. 4/7/21   Nette Jiménez MD   Lancets (freestyle) lancets Use as directed. Check sugars tid E11.9 6/7/22   Vidal Luevano APRN   Lantus SoloStar 100 UNIT/ML injection pen INJECT FOUR TO 20 UNITS ONCE A DAY AS DIRECTED 4/8/22   Nette Jiménez MD   pantoprazole (PROTONIX) 40 MG EC tablet TAKE ONE TABLET BY MOUTH EVERY DAY 7/11/22   Sobeida Espinosa APRN   PEG-KCl-NaCl-NaSulf-Na Asc-C (Plenvu) 140 g reconstituted solution solution Take 140 g by mouth Take As Directed. Attn: Pharmacist: please see notes for coupon code. 4/28/22   Sobeida Espinosa APRN   Probiotic, Lactobacillus, capsule Take 1 each by mouth 2 (Two) Times a Day. 3/4/22   Sobeida Andrea APRN   saccharomyces boulardii (Florastor) 250 MG capsule Take 1 capsule by mouth 2 (Two) Times a Day. 8/5/22   Vidal Luevano APRN    silver sulfadiazine (Silvadene) 1 % cream Apply 1 application topically to the appropriate area as directed Daily. 10/5/21   Chelo Villeda MD   Sod Picosulfate-Mag Ox-Cit Acd (Clenpiq) 10-3.5-12 MG-GM -GM/160ML solution Take 160 mL by mouth Take As Directed. 4/28/22   Sobeida Espinosa APRN   sodium chloride 0.9 % solution with HYDROmorphone (PF) 50 MG/5ML solution 0.2 mg/mL Infuse 0-2 mg into a venous catheter Every 1 (One) Hour As Needed for Pain.    Provider, MD Nette   sodium chloride 0.9 % solution  4/11/22   Provider, MD Nette   triamcinolone (KENALOG) 0.1 % ointment Apply  topically to the appropriate area as directed 2 (Two) Times a Day. For one week for venous stasis dermatitis. Do not apply to open wounds  Patient taking differently: Apply 1 application topically to the appropriate area as directed 2 (Two) Times a Day. For one week for venous stasis dermatitis. Do not apply to open wounds 8/31/21   Maged Schwab MD        Social History:   Social History     Tobacco Use   • Smoking status: Current Every Day Smoker     Packs/day: 2.00     Years: 41.00     Pack years: 82.00     Types: Cigarettes   • Smokeless tobacco: Never Used   Vaping Use   • Vaping Use: Never used   Substance Use Topics   • Alcohol use: No   • Drug use: No     Recent travel: not applicable     Review of Systems:  Review of Systems   Constitutional: Positive for fatigue. Negative for chills and fever.   HENT: Positive for congestion. Negative for rhinorrhea and sore throat.    Eyes: Negative for pain and visual disturbance.   Respiratory: Positive for cough, chest tightness and shortness of breath. Negative for apnea.    Cardiovascular: Negative for chest pain and palpitations.   Gastrointestinal: Positive for abdominal pain, diarrhea and nausea. Negative for vomiting.   Genitourinary: Negative for difficulty urinating and dysuria.   Musculoskeletal: Negative for joint swelling and myalgias.   Skin:  "Negative for color change.   Neurological: Negative for seizures and headaches.   Psychiatric/Behavioral: Negative.         Physical Exam:  /69 (BP Location: Right arm, Patient Position: Lying)   Pulse 89   Temp 98.6 °F (37 °C) (Oral)   Resp 18   Ht 167.6 cm (66\")   Wt 72.8 kg (160 lb 7.9 oz)   SpO2 97%   BMI 25.90 kg/m²     Physical Exam  Vitals and nursing note reviewed.   Constitutional:       General: She is not in acute distress.     Appearance: Normal appearance. She is not toxic-appearing.      Comments: Appears uncomfortable    HENT:      Head: Normocephalic and atraumatic.      Jaw: There is normal jaw occlusion.   Eyes:      General: Lids are normal.      Extraocular Movements: Extraocular movements intact.      Conjunctiva/sclera: Conjunctivae normal.      Pupils: Pupils are equal, round, and reactive to light.   Cardiovascular:      Rate and Rhythm: Normal rate and regular rhythm.      Pulses: Normal pulses.      Heart sounds: Normal heart sounds.   Pulmonary:      Effort: Pulmonary effort is normal. No respiratory distress.      Breath sounds: Normal breath sounds. No wheezing or rhonchi.   Chest:      Comments: L chest wall port  Abdominal:      General: Abdomen is flat.      Palpations: Abdomen is soft.      Tenderness: There is no abdominal tenderness. There is no guarding or rebound.   Musculoskeletal:         General: Normal range of motion.      Cervical back: Normal range of motion and neck supple.      Right lower leg: Pitting Edema (mild) present.      Left lower leg: Pitting Edema (mild) present.   Skin:     General: Skin is warm and dry.      Comments: Chronic ulceration of BL LE    Neurological:      Mental Status: She is alert and oriented to person, place, and time. Mental status is at baseline.   Psychiatric:         Mood and Affect: Mood normal.                Medications in the Emergency Department:  Medications   aspirin chewable tablet 324 mg (324 mg Oral Given 9/5/22 " 2201)   ondansetron ODT (ZOFRAN-ODT) disintegrating tablet 4 mg (4 mg Oral Given 9/5/22 2307)   acetaminophen (TYLENOL) tablet 975 mg (975 mg Oral Given 9/5/22 2200)   ALPRAZolam (XANAX) tablet 0.5 mg (0.5 mg Oral Given 9/5/22 2327)        Labs  Lab Results (last 24 hours)     Procedure Component Value Units Date/Time    POC Troponin I with Hold Tube [[id number]] Collected: 09/05/22 2046    Specimen: Blood Updated: 09/05/22 2149    Narrative:      The following orders were created for panel order POC Troponin I with Hold Tube.  Procedure                               Abnormality         Status                     ---------                               -----------         ------                     POC Troponin I[[id number]]                                                              HOLD Troponin-I Tube[[id number]]                             Final result                 Please view results for these tests on the individual orders.    CBC & Differential [[id number]]  (Abnormal) Collected: 09/05/22 2046    Specimen: Blood Updated: 09/05/22 2102    Narrative:      The following orders were created for panel order CBC & Differential.  Procedure                               Abnormality         Status                     ---------                               -----------         ------                     CBC Auto Differential[[id number]]        Abnormal            Final result                 Please view results for these tests on the individual orders.    Comprehensive Metabolic Panel [[id number]]  (Abnormal) Collected: 09/05/22 2046    Specimen: Blood Updated: 09/05/22 2123     Glucose 127 mg/dL      BUN 13 mg/dL      Creatinine 0.48 mg/dL      Sodium 137 mmol/L      Potassium 4.1 mmol/L      Chloride 98 mmol/L      CO2 29.2 mmol/L      Calcium 9.3 mg/dL      Total Protein 7.7 g/dL      Albumin 4.00 g/dL      ALT (SGPT) 8 U/L      AST (SGOT) 11 U/L      Alkaline Phosphatase 99 U/L      Total Bilirubin 0.3 mg/dL       Globulin 3.7 gm/dL      A/G Ratio 1.1 g/dL      BUN/Creatinine Ratio 27.1     Anion Gap 9.8 mmol/L      eGFR 111.3 mL/min/1.73      Comment: National Kidney Foundation and American Society of Nephrology (ASN) Task Force recommended calculation based on the Chronic Kidney Disease Epidemiology Collaboration (CKD-EPI) equation refit without adjustment for race.       Narrative:      GFR Normal >60  Chronic Kidney Disease <60  Kidney Failure <15      Lipase [[id number]]  (Normal) Collected: 09/05/22 2046    Specimen: Blood Updated: 09/05/22 2123     Lipase 17 U/L     BNP [[id number]]  (Normal) Collected: 09/05/22 2046    Specimen: Blood Updated: 09/05/22 2121     proBNP 147.3 pg/mL     Narrative:      Among patients with dyspnea, NT-proBNP is highly sensitive for the detection of acute congestive heart failure. In addition NT-proBNP of <300 pg/ml effectively rules out acute congestive heart failure with 99% negative predictive value.    Results may be falsely decreased if patient taking Biotin.      Magnesium [[id number]]  (Normal) Collected: 09/05/22 2046    Specimen: Blood Updated: 09/05/22 2123     Magnesium 1.9 mg/dL     CBC Auto Differential [[id number]]  (Abnormal) Collected: 09/05/22 2046    Specimen: Blood Updated: 09/05/22 2102     WBC 6.39 10*3/mm3      RBC 4.91 10*6/mm3      Hemoglobin 13.2 g/dL      Hematocrit 40.8 %      MCV 83.1 fL      MCH 26.9 pg      MCHC 32.4 g/dL      RDW 14.1 %      RDW-SD 42.8 fl      MPV 9.9 fL      Platelets 187 10*3/mm3      Neutrophil % 81.3 %      Lymphocyte % 11.4 %      Monocyte % 5.2 %      Eosinophil % 1.6 %      Basophil % 0.0 %      Immature Grans % 0.5 %      Neutrophils, Absolute 5.20 10*3/mm3      Lymphocytes, Absolute 0.73 10*3/mm3      Monocytes, Absolute 0.33 10*3/mm3      Eosinophils, Absolute 0.10 10*3/mm3      Basophils, Absolute 0.00 10*3/mm3      Immature Grans, Absolute 0.03 10*3/mm3      nRBC 0.0 /100 WBC     POC Troponin I [[id number]]  (Normal) Collected:  09/05/22 2047    Specimen: Blood Updated: 09/05/22 2059     Troponin I 0.00 ng/mL      Comment: Serial Number: [id number]:  [id number]       COVID PRE-OP / PRE-PROCEDURE SCREENING ORDER (NO ISOLATION) - Swab, Nasopharynx [[id number]]  (Normal) Collected: 09/05/22 2048    Specimen: Swab from Nasopharynx Updated: 09/06/22 0128    Narrative:      The following orders were created for panel order COVID PRE-OP / PRE-PROCEDURE SCREENING ORDER (NO ISOLATION) - Swab, Nasopharynx.  Procedure                               Abnormality         Status                     ---------                               -----------         ------                     COVID-19,APTIMA PANTHER(...[[id number]]  Normal              Final result                 Please view results for these tests on the individual orders.    COVID-19,APTIMA PANTHER(HEATHER),[facility], NP/OP SWAB IN UTM/VTM/SALINE TRANSPORT MEDIA,24 HR TAT - Swab, Nasopharynx [[id number]]  (Normal) Collected: 09/05/22 2048    Specimen: Swab from Nasopharynx Updated: 09/06/22 0128     COVID19 Not Detected    Narrative:      Fact sheet for providers: https://www.fda.gov/media/902038/download     Fact sheet for patients: https://www.fda.gov/media/685776/download    Test performed by RT PCR.    POC Troponin I with Hold Tube [[id number]] Collected: 09/05/22 2249    Specimen: Blood Updated: 09/06/22 0013    Narrative:      The following orders were created for panel order POC Troponin I with Hold Tube.  Procedure                               Abnormality         Status                     ---------                               -----------         ------                     POC Troponin I[[id number]]                                                              HOLD Troponin-I Tube[[id number]]                                                          Please view results for these tests on the individual orders.           Imaging:  XR Chest 1 View    Result Date: 9/5/2022  PROCEDURE: XR CHEST 1  VW  COMPARISON: [facility], CT, CT CHEST LOW DOSE CANCER SCREENING WO, 8/30/2022, 10:37.  [facility], CR, XR CHEST 1 VW, 1/21/2022, 13:08.  INDICATIONS: CHEST PAIN TODAY  FINDINGS:  Tip of the left internal jugular central venous port catheter terminates in the lower SVC.  Heart size is not enlarged.  Lungs are adequately expanded and appear grossly clear.  No pneumothorax or large pleural effusion is seen.       No acute cardiopulmonary abnormality is identified.       GUDELIA NUÑEZ MD       Electronically Signed and Approved By: GUDELIA NUÑEZ MD on 9/05/2022 at 22:01               Procedures:  ECG 12 Lead      Date/Time: 9/5/2022 9:34 PM  Performed by: Manuelito Lora MD  Authorized by: Manuelito Lora MD   Interpreted by physician  Rhythm: sinus rhythm  BPM: 90  Conduction: left bundle branch block  Comments: Normal P wave  Non-specific ST changes  Normal QT  Comparison: unchanged          Progress  ED Course as of 09/06/22 0745   Mon Sep 05, 2022   2043 --- PROVIDER IN TRIAGE NOTE ---    The patient was evaluated my Kristopher turk in triage. Orders were placed and the patient is currently awaiting disposition.    [AJ]      ED Course User Index  [AJ] Kristopher Villagomez PA-C                            The patient was initially evaluated in the triage area where orders were placed. The patient was later dispositioned by Manuelito Lora MD.      Medical Decision Making:  MDM  Number of Diagnoses or Management Options     Amount and/or Complexity of Data Reviewed  Clinical lab tests: ordered and reviewed  Tests in the radiology section of CPT®: ordered and reviewed  Review and summarize past medical records: yes  Independent visualization of images, tracings, or specimens: yes     HEART SCORE  History: Slightly Suspicious (0)  ECG: Normal (0)  Age: 45-64 years old (1)  Risk factors: 1-2 Risk Factors (1)  Troponin: normal (0)    This patient's HEART score is  2.    According to the HEART Score Study: Score (Risk of adverse cardiac event in the next 14 days)  Scores 0-3: (0.9-1.7%) In the HEART Score study, these patients were discharged home.  Scores 4-6: (12-16.6%)  In the HEART Score study, these patients were admitted to the hospital.  Scores ?7: (50-65%) In the HEART Score study, these patients were candidates for early invasive measures.   Final disposition is based on individual provider judgement and current clinical situation.    Patient has low risk heart score.  Nonischemic ECG.  Chest x-ray shows no acute cardiopulmonary process.  Patient is pain-free in the emergency department.  The patient´s CBC was reviewed and shows no abnormalities of critical concern. Of note, there is no anemia requiring a blood transfusion and the platelet count is acceptable.  The patient´s CMP was reviewed and shows no abnormalities of critical concern. Of note, the patient´s sodium and potassium are acceptable. The patient´s liver enzymes are unremarkable. The patient´s renal function (creatinine) is preserved. The patient has a normal anion gap.  Patient provided with contact information for cardiology for close follow-up.  We discussed return precautions including worsening symptoms or any additional concerns.    COVID 19 test performed in ED, results pending.     I have provided education on COVID-19 and viral illnesses to this patient, and educated them on when they should return to their primary care provider or the emergency department itself should their symptoms worsen.  Based on the history, exam, diagnostic testing and reassessment, the patient has no signs of meningitis, significant pneumonia, pyelonephritis, sepsis or other acute serious bacterial infections, or other significant pathology to warrant further testing, continued ED treatment, admission or specialist evaluation. They verbalized understanding of this diagnosis, my treatment plant, and recommendations for  follow up. The patient was counseled to return to the ED for reevaluation for worsening cough, shortness of breath, uncontrollable headache, uncontrollable fever, altered mental status, or any symptoms which caused them concern.     Discussed importance of self-quarantine until results are obtained.    Final diagnoses:   Suspected COVID-19 virus infection   Viral illness        Disposition:  ED Disposition     ED Disposition   Discharge    Condition   Stable    Comment   --             This medical record created using voice recognition software.    I, Nisa Davis, provided documentation assistance for and in the presence of Dr. Lora.     Nisa Davis  09/05/22 [id number]       Manuelito Lora MD  09/06/22 [id number]

## 2022-09-07 ENCOUNTER — TELEPHONE (OUTPATIENT)
Dept: CASE MANAGEMENT | Facility: OTHER | Age: 57
End: 2022-09-07

## 2022-09-07 DIAGNOSIS — M25.551 RIGHT HIP PAIN: ICD-10-CM

## 2022-09-07 DIAGNOSIS — M25.559 HIP PAIN: Primary | ICD-10-CM

## 2022-09-07 DIAGNOSIS — M16.10 HIP ARTHRITIS: ICD-10-CM

## 2022-09-07 NOTE — TELEPHONE ENCOUNTER
"Spoke to Kiko and Dorian. Also talked to pt, daughter and home health. Pt was DC'd [facility]. New HH referral entered.    Also needing Ensures for 20 lb weight loss in a \"few months\". Declined Glucerna or diabetic friendly drinks.  "

## 2022-09-07 NOTE — TELEPHONE ENCOUNTER
"1. Pt requested ortho referral- wants possible surgical intervention for right hip arthritis.     2. Pt asking for medicine for hip pain. She is allergic to NSAIDS/Naptoxen/Motrin- \"tears stomach up\". Open to topical pain cream/gel.    3. Also asked for steroids for hip pain. Also wants a yeast infection pill with it. Pt stated she gets yeast infection with steroid use.  "

## 2022-09-08 NOTE — TELEPHONE ENCOUNTER
Per pcp: Lets have her see orthopedics.  I am concerned of giving steroids to her since she is a diabetic patient and her blood sugars are not well controlled.  Would recommend Tylenol 1000 mg 4 times daily as needed.     Called pt, left message.    Kiko- what about a topical med, while we wait for ortho?

## 2022-09-09 ENCOUNTER — TELEPHONE (OUTPATIENT)
Dept: ORTHOPEDIC SURGERY | Facility: CLINIC | Age: 57
End: 2022-09-09

## 2022-09-09 RX ORDER — LIDOCAINE 50 MG/G
1 PATCH TOPICAL EVERY 24 HOURS
Qty: 30 EACH | Refills: 0 | Status: SHIPPED | OUTPATIENT
Start: 2022-09-09 | End: 2023-03-14

## 2022-09-09 NOTE — TELEPHONE ENCOUNTER
DX - Right hip pain, Hip arthritis - CT 8/30/22 - ESTABLISHED WITH DR TATE BUT PER REF NOTES REQUESTING TO SEE DR ADLER  Per CT ABD PELVIS on 8/30/2022: Severe arthritic change of both hips. NO SURGERY

## 2022-09-09 NOTE — TELEPHONE ENCOUNTER
She reports having an allergy to NSAIDs, that will eliminate Voltaren gel.  We can do a lidocaine patch.  Lidoderm patch sent to pharmacy

## 2022-09-09 NOTE — TELEPHONE ENCOUNTER
Pt aware.    In regards to steroids- pt states sugars are well controlled, 130-160s. Only over 200 once. FYI

## 2022-09-12 ENCOUNTER — TELEPHONE (OUTPATIENT)
Dept: FAMILY MEDICINE CLINIC | Facility: CLINIC | Age: 57
End: 2022-09-12

## 2022-09-12 NOTE — PROGRESS NOTES
Chief Complaint/Care Team   Neck Nodule (Hx of hodgkins Lymphoma/)    Vidal Luevano APRN [place], [place], LUZ    History of Present Illness     Diagnosis: Hodgkin's Lymphoma Stage III diagnosed 2008    Current Treatment: Active Surveillance  Previous Treatment:   -In 2008, s/p 3 cycles of ABVD (declined further treatment after 3 cycles, but was in remission)  -treated at [facility] after Right groin LN seen and B symptoms with 4 additional cycles of ABVD with last 2 cycles without Bleomycin  -Right inguinal LN PET Avid treated with RT from 2/2012-3/2012 with 30 Gy  -Active Surveillance from [id number], scanned records from Dr. Teodoro Mancuso ([facility]) from 11/2/2021 stated pt underwent PET CT scan which was negative for evidence of recurrence        Isha Llanes is a 56 y.o. female who presents to [facility] HEMATOLOGY & ONCOLOGY for follow up regarding history of Hodgkin's Lymphoma stage III s/p treatment with ABVD diagnosed 2008.      Per scanned records from Dr. Shala Norris, (pt reported she was initially treated for a spider bite until lesion was biopsied and revealed Hodgkin's lympoma), treated in 2008     when she had nodular sclerosing Hodgkin's lymphoma stage III.  She received 3     cycles of ABVD.  She refused further treatment as she could not tolerate it.      She did go into remission with the 3 cycles which remained for a while.  From     February 2011 to October 2011 she was evaluated at [facility] when     she had right groin lymph node with B symptoms, but she reports receiving treatment in [place], [place] by Dr. Casey.  She received 4 additional     cycles of ABVD the last 2 without bleomycin.  She had a solitary lymph node residual in the right inguinal region which was PET avid and was treated with     radiation therapy February 2012 to March 2012 30 Gy.  She has been followed     since then with CAT scans and PET scans  which show small nonpathologically     enlarged lymph nodes.  She says she also got chemotherapy with Dr. Gaming before and had been following up with Dr. Paredes prior to both providers leaving their practice.  Most recently per scanned clinic note from Dr. Teodoro Mancuso (private heme-onc practice in [facility]), patient with PET/CT around November 2021 which did not reveal any evidence of cancer recurrence.       Patient has past medical history significant for anxiety, chronic ulcers in her lower extremities secondary to poorly controlled diabetes/chronic venous stasis, diabetes mellitus with use of continuous glucose monitor.  Patient's pain is currently managed with Dr. Boudreaux in [place] and she has a Dilaudid pain pump implanted in her right lower back.  She reports Dr. Boudreaux was planning to do a dye study her pain pump to assess whether or not the pump is functioning properly.    She presents today to establish care and for follow-up regarding her history of Hodgkin's lymphoma.  She today she is reporting pain in her right lower quadrant of her abdomen that has been constant, throbbing pain that has been present for at least the last 2 to 3 months.  She is underwent imaging of her abdomen pelvis which was negative for any new lesions suspicious for cancer.  She reports completing a course of antibiotics for her chronic bilateral lower extremity ulcers attributed to venous stasis.  He reports having palpable lymph nodes in her right cervical region and reports feeling fatigue.  Reports having approximately 20 pound weight loss over the past 2 months along with a poor appetite.  She is a current smoker of about 1 to 2 packs/day and she states she has been smoking since age 14.     Patient reports being wheelchair-bound for several months to years reports difficulty standing up and difficulty walking long distances secondary to weakness in her lower extremities.  Reports being able to complete her  "ADLs, but does not drive.  Currently lives with her daughter and 3 grandchildren.      Review of Systems   Constitutional: Positive for appetite change (decreased) and fatigue. Negative for chills and fever.   Respiratory: Positive for cough and shortness of breath.    Gastrointestinal: Positive for abdominal pain. Negative for abdominal distention.   Genitourinary: Positive for pelvic pain (right side up into stomach).   Musculoskeletal: Positive for back pain.   Skin: Negative for rash.   Neurological: Positive for dizziness and weakness. Negative for headache.   Hematological: Positive for adenopathy (nodule on neck- pt is unawre of which side). Does not bruise/bleed easily.   Psychiatric/Behavioral: Positive for sleep disturbance.        Oncology/Hematology History    No history exists.       Objective     Vitals:    09/13/22 0931   BP: 101/77   Pulse: 83   Resp: 18   Temp: 97.9 °F (36.6 °C)   SpO2: 96%   Weight: 70.3 kg (154 lb 15.7 oz)   Height: 167.6 cm (65.98\")  Comment: pt couldn't stand up straight to get height- NL   PainSc:   6   PainLoc: Comment: generalized     ECOG score: 2 (wheelchair- unable to stand up straight. NL)         PHQ-9 Total Score:         Physical Exam  Constitutional:       Appearance: Normal appearance.   HENT:      Head: Normocephalic and atraumatic.   Eyes:      Conjunctiva/sclera: Conjunctivae normal.   Neck:      Comments: Left cervical adenopathy  Cardiovascular:      Rate and Rhythm: Normal rate.      Pulses: Normal pulses.      Comments: Chemotherapy port located in left upper chest not accessed.  Pulmonary:      Effort: Pulmonary effort is normal.      Breath sounds: Normal breath sounds. No wheezing.   Abdominal:      General: There is no distension.      Palpations: Abdomen is soft.      Tenderness: There is abdominal tenderness. There is no guarding or rebound.      Comments: Right lower quadrant tenderness   Musculoskeletal:      Comments: Right lower back subcutaneous " pain pump present   Lymphadenopathy:      Cervical: Cervical adenopathy present.   Skin:     General: Skin is warm.      Coloration: Skin is not jaundiced.      Findings: No bruising or rash.   Neurological:      Mental Status: She is alert.           Past Medical History     Past Medical History:   Diagnosis Date   • Anxiety     NO CURRENT MEDICATION   • Aortic stenosis, severe    • Arthritis    • Asthma    • Back pain    • Blood clotting disorder (HCC)    • Cancer associated pain    • Chronic low back pain with sciatica    • Diabetes mellitus (HCC)     TYPE 2   • Dyspnea on effort    • GERD (gastroesophageal reflux disease)    • H/O lumpectomy    • H/O: hysterectomy    • Hiatal hernia    • History of degenerative disc disease    • History of kidney stones    • History of pulmonary embolus (PE)    • History of transfusion    • Hodgkin's lymphoma (HCC)    • Immobility    • Lupus (HCC)    • Mitral valve prolapse    • Pelvic pain    • Peripheral neuropathy    • Personal history of DVT (deep vein thrombosis)    • Presence of intrathecal pump    • Sacroiliac joint disease    • SI (sacroiliac) joint inflammation (HCC)    • Venous insufficiency      Current Outpatient Medications on File Prior to Visit   Medication Sig Dispense Refill   • acetaminophen (TYLENOL) 500 MG tablet Take 1,000-1,500 mg by mouth Every 6 (Six) Hours As Needed for Mild Pain .     • B-D UF III MINI PEN NEEDLES 31G X 5 MM misc      • bacitracin 500 UNIT/GM ointment Apply 1 application topically to the appropriate area as directed 2 (Two) Times a Day. 453.9 g 1   • clopidogrel (Plavix) 75 MG tablet Take 1 tablet by mouth Daily. 90 tablet 0   • Continuous Glucose Monitor Sup kit 1 kit Continuous. One continuous glucose meter, and supplies needed. Give one month worth of supplies, with 3 refills. ICD-10: e11.9 1 kit 3   • Elastic Bandages & Supports (ACE Bandage Self-Adhering) misc 1 each Daily. 60 each 0   • EPINEPHrine (EPIPEN) 0.3 MG/0.3ML solution  auto-injector injection TAKE AS DIRECTED INCASE OF ALLERGIC REACTION.     • famotidine (PEPCID) 20 MG tablet Take 1 tablet by mouth At Night As Needed for Heartburn. 90 tablet 1   • Flovent  MCG/ACT inhaler inhale 2 puffs by mouth twice daily (Patient taking differently: Inhale 1 puff 2 (Two) Times a Day.) 12 g 2   • fluconazole (DIFLUCAN) 150 MG tablet Take 1 tablet by mouth 1 Time for 1 dose. repeat in 3 days 2 tablet 0   • FREESTYLE LITE test strip by Other route 4 (Four) Times a Day. Use to test blood sugar 4 times daily     • furosemide (LASIX) 40 MG tablet Take 1 tablet by mouth Daily. 90 tablet 1   • gabapentin (NEURONTIN) 300 MG capsule TAKE ONE CAPSULE BY MOUTH THREE TIMES DAILY (Patient taking differently: Take 300 mg by mouth 4 (Four) Times a Day.) 270 capsule 1   • HYDROmorphone (DILAUDID) 1 mg/mL solution Infuse  into a venous catheter Dose Adjusted By Provider As Needed. PER PAIN PUMP     • hydrOXYzine (ATARAX) 50 MG tablet Take 50 mg by mouth Every 6 (Six) Hours As Needed.     • Insulin Lispro (ADMELOG SOLOSTAR) 100 UNIT/ML injection pen SLIDING SCALE R/T BS. Takes 2-12 units tid 2 pen 0   • ipratropium-albuterol (DUO-NEB) 0.5-2.5 mg/3 ml nebulizer Inhale 1 ampule.     • Lancets (freestyle) lancets Use as directed. Check sugars tid E11.9 100 each 0   • Lantus SoloStar 100 UNIT/ML injection pen INJECT FOUR TO 20 UNITS ONCE A DAY AS DIRECTED     • lidocaine (Lidoderm) 5 % Place 1 patch on the skin as directed by provider Daily. Remove & Discard patch within 12 hours or as directed by MD 30 each 0   • ondansetron ODT (ZOFRAN-ODT) 4 MG disintegrating tablet Place 1 tablet on the tongue 4 (Four) Times a Day As Needed for Nausea or Vomiting. 9 tablet 0   • pantoprazole (PROTONIX) 40 MG EC tablet TAKE ONE TABLET BY MOUTH EVERY DAY 90 tablet 1   • PEG-KCl-NaCl-NaSulf-Na Asc-C (Plenvu) 140 g reconstituted solution solution Take 140 g by mouth Take As Directed. Attn: Pharmacist: please see notes for  coupon code. 1 each 0   • ProAir  (90 Base) MCG/ACT inhaler Inhale 2 puffs Every 4 (Four) Hours As Needed for Wheezing. 8.5 g 2   • Probiotic, Lactobacillus, capsule Take 1 each by mouth 2 (Two) Times a Day. 30 capsule 0   • saccharomyces boulardii (Florastor) 250 MG capsule Take 1 capsule by mouth 2 (Two) Times a Day. 60 capsule 5   • silver sulfadiazine (Silvadene) 1 % cream Apply 1 application topically to the appropriate area as directed Daily. 400 g 3   • Sod Picosulfate-Mag Ox-Cit Acd (Clenpiq) 10-3.5-12 MG-GM -GM/160ML solution Take 160 mL by mouth Take As Directed. 320 mL 0   • sodium chloride 0.9 % solution with HYDROmorphone (PF) 50 MG/5ML solution 0.2 mg/mL Infuse 0-2 mg into a venous catheter Every 1 (One) Hour As Needed for Pain.     • sodium chloride 0.9 % solution      • triamcinolone (KENALOG) 0.1 % ointment Apply  topically to the appropriate area as directed 2 (Two) Times a Day. For one week for venous stasis dermatitis. Do not apply to open wounds (Patient taking differently: Apply 1 application topically to the appropriate area as directed 2 (Two) Times a Day. For one week for venous stasis dermatitis. Do not apply to open wounds) 15 g 0   • bacitracin 500 UNIT/GM ointment        Current Facility-Administered Medications on File Prior to Visit   Medication Dose Route Frequency Provider Last Rate Last Admin   • sterile water irrigation solution 500 mL  500 mL Irrigation Once Damari Cornejo PA-C          Allergies   Allergen Reactions   • Amoxicillin Shortness Of Breath   • Ceclor [Cefaclor] Shortness Of Breath   • Penicillins Shortness Of Breath   • Sulfa Antibiotics Rash   • Valium [Diazepam] Mental Status Change   • Ambien [Zolpidem] Unknown - Low Severity   • Aspirin GI Intolerance   • Ativan [Lorazepam] Unknown - Low Severity   • Benadryl [Diphenhydramine] Unknown - Low Severity   • Biaxin [Clarithromycin] Unknown - Low Severity   • Cefazolin Unknown - Low Severity   • Cephalexin  Unknown - Low Severity   • Cephalosporins Unknown - Low Severity   • Clindamycin Unknown - Low Severity   • Compazine [Prochlorperazine Edisylate] Rash   • Contrast Dye Other (See Comments)     Caused pain in her arm   • Doxycycline Rash   • Eggs Or Egg-Derived Products Unknown - Low Severity   • Nsaids Unknown - Low Severity   • Phenergan [Promethazine Hcl] Rash   • Promethazine Unknown - Low Severity   • Vancomycin Itching     Past Surgical History:   Procedure Laterality Date   • BACK SURGERY      SPINAL FUSION    • BLADDER REPAIR     • CARDIAC CATHETERIZATION N/A 3/6/2019    Procedure: Valvuloplasty;  Surgeon: Wayne Johnson MD;  Location: [facility] CATH INVASIVE LOCATION;  Service: Cardiology   • CARDIAC VALVE REPLACEMENT  2021   • CHOLECYSTECTOMY     • COLONOSCOPY N/A 12/29/2021    Procedure: COLONOSCOPY;  Surgeon: Karine Orlando MD;  Location: [facility] ENDOSCOPY;  Service: Gastroenterology;  Laterality: N/A;  POOR PREP   • COLONOSCOPY N/A 4/13/2022    Procedure: COLONOSCOPY WITH POLYPECTOMY;  Surgeon: Karine Orlando MD;  Location: [facility] ENDOSCOPY;  Service: Gastroenterology;  Laterality: N/A;  COLON POLYP, HEMORRHOIDS, POOR PREP   • ENDOSCOPY N/A 12/29/2021    Procedure: ESOPHAGOGASTRODUODENOSCOPY;  Surgeon: Karine Orlando MD;  Location: [facility] ENDOSCOPY;  Service: Gastroenterology;  Laterality: N/A;  ESOPHAGITIS, GASTRITIS, HIATAL HERNIA   • HYSTERECTOMY     • LYMPHADENECTOMY     • PACEMAKER IMPLANTATION     • PAIN PUMP INSERTION/REVISION N/A 10/18/2019    Procedure: PAIN PUMP INSERTION [facility] 10-18-19 [place];  Surgeon: Horace Rios MD;  Location: [facility] OR;  Service: Pain Management   • PAIN PUMP INSERTION/REVISION N/A 11/23/2020    Procedure: pain pump removal;  Surgeon: Horace Rios MD;  Location: [facility] OR;  Service: Pain Management;  Laterality: N/A;   • TONSILLECTOMY     • TUBAL ABDOMINAL LIGATION     • TUMOR REMOVAL       Social History      Socioeconomic History   • Marital status:    Tobacco Use   • Smoking status: Current Every Day Smoker     Packs/day: 2.00     Years: 41.00     Pack years: 82.00     Types: Cigarettes   • Smokeless tobacco: Never Used   Vaping Use   • Vaping Use: Never used   Substance and Sexual Activity   • Alcohol use: No   • Drug use: No   • Sexual activity: Defer     Family History   Problem Relation Age of Onset   • Pancreatic cancer Sister    • Pancreatic cancer Paternal Grandmother    • Malig Hyperthermia Neg Hx        Results     Result Review   The following data was reviewed by: Jeaneth Haywood MD on 09/13/2022:  Lab Results   Component Value Date    HGB 13.1 09/13/2022    HCT 41.5 09/13/2022    MCV 83.5 09/13/2022     09/13/2022    WBC 4.39 09/13/2022    NEUTROABS 2.69 09/13/2022    LYMPHSABS 1.16 09/13/2022    MONOSABS 0.41 09/13/2022    EOSABS 0.11 09/13/2022    BASOSABS 0.01 09/13/2022     Lab Results   Component Value Date    GLUCOSE 147 (H) 09/13/2022    BUN 11 09/13/2022    CREATININE 0.55 (L) 09/13/2022     09/13/2022    K 4.1 09/13/2022    CL 99 09/13/2022    CO2 30.3 (H) 09/13/2022    CALCIUM 9.3 09/13/2022    PROTEINTOT 8.3 09/13/2022    ALBUMIN 4.10 09/13/2022    BILITOT 0.2 09/13/2022    ALKPHOS 105 09/13/2022    AST 12 09/13/2022    ALT 9 09/13/2022     Lab Results   Component Value Date    MG 1.9 09/05/2022    PHOS 3.9 06/07/2021    TSH 2.240 01/13/2022     ESR from 9/13/2022 was 36     [unfilled]  No radiology results for the last day  CT Abdomen Pelvis Without Contrast    Result Date: 8/30/2022  Impression:    1. No acute process demonstrated  2. No adenopathy in the abdomen and pelvis  3. Splenomegaly  4. Small nonobstructing right renal calculus     TERRA RUIZ MD       Electronically Signed and Approved By: TERRA RUIZ MD on 8/30/2022 at 12:21             CT Soft Tissue Neck Without Contrast    Result Date: 8/30/2022  Impression:   CT scan of the soft tissues of the neck  without IV contrast demonstrating no soft tissue mass or adenopathy.     MANISH LIM MD       Electronically Signed and Approved By: MANISH LIM MD on 8/30/2022 at 12:10             XR Chest 1 View    Result Date: 9/5/2022  Impression:  No acute cardiopulmonary abnormality is identified.       GUDELIA NUÑEZ MD       Electronically Signed and Approved By: GUDELIA NUÑEZ MD on 9/05/2022 at 22:01             CT Chest Low Dose Cancer Screening WO    Result Date: 8/30/2022  Impression:    1. Multiple new less than 5 mm bilateral upper lobe nodules, likely infectious  2. New ground-glass opacity in the inferior left upper lobe and focal airspace consolidation in the right lower lobe.  Findings may represent infection, correlate with any current respiratory symptoms.  Recommend follow-up chest CT in 1-2 months to assess for resolution  Lung rads category 2 S. As noted, a short-term follow-up chest CT is recommended to reassess the new lung findings.  Low-dose CT lung screening should be performed in 12 months      TERRA RUIZ MD       Electronically Signed and Approved By: TERRA RUIZ MD on 8/30/2022 at 11:59               Assessment & Plan     Diagnoses and all orders for this visit:    1. History of Hodgkin's lymphoma  -     CBC & Differential; Future  -     Comprehensive Metabolic Panel; Future  -     Sedimentation Rate; Future  -     Ambulatory Referral to ONC Social Work  -     NM Pet Skull Base To Mid Thigh; Future        Ishagloria Llanes is a 56 y.o. female who presents to [facility] HEMATOLOGY & ONCOLOGY for follow up regarding Hodgkin's Lymphoma is currently under active surveillance.  Please see above for treatment history.  Also presents today to establish care with our practice.  She has been following with Dr. Mancuso local primary oncologist here in [place], [place].  Most clinic notes from his office report patient went a PET/CT in approximately November 2021  which was negative for any recurrence of Hodgkin's lymphoma.     Today she reports having continued chronic pain in her right lower quadrant that is not well controlled with use of her pain pump.  Patient follows with Dr. Boudreaux (pain management doctor located in [place]) who has an upcoming dye study plan to assess the functionality of her pain pump.  I recommend she continue to follow up with Dr. Boudreaux regarding her pain management and she plans to follow with him regarding her pain control.  She recently underwent scans her chest abdomen and pelvis on August 30, 2022 which were negative for any signs of malignancy in her abdomen, but there were several small nodules located in her lung (see reports above).  She also reports a approximate 20 pound weight loss over the past 2 months along with decrease in appetite.  On physical exam there were palpable lymph nodes in her left cervical region and left supraclavicular region. Given her B symptoms and lymphadenopathy plan to obtain PET/CT to assess for recurrence of her Hodgkin lymphoma and further assess the pulmonary nodules seen on most recent CT scans.  Patient with history of smoking 1 to 2 packs/day of cigarettes.  In addition, I will obtain CBC, CMP, and ESR labs.    Discussed with patient possibility for biopsy of any suspicious lesion seen on the PET CT scan.  Patient agreeable with plan and I answered her questions to her satisfaction.      Plan to have patient follow-up in approximately 4 weeks after lab work and imaging test have been completed.    Follow Up     I spent 30 minutes caring for Isha on this date of service. This time includes time spent by me in the following activities:preparing for the visit, reviewing tests, obtaining and/or reviewing a separately obtained history, performing a medically appropriate examination and/or evaluation , ordering medications, tests, or procedures, documenting information in the medical record and care  coordination.    This is an acute or chronic illness that poses a threat to life or bodily function. The above treatment plan involves a high risk of complications and/or mortality of patient management.    The patient was seen and examined. Work by the provider also included review and/or ordering of lab tests, review and/or ordering of radiology tests, review and/or ordering of medicine tests, discussion with other physicians or providers, independent review of data, obtaining old records, review/summation of old records, and/or other review. Patient was seen during the coronavirus pandemic. Additional time and precaution was required to complete their clinic visit and/or treatment visit due to these issues.    I have reviewed the family history, social history, and past medical history for this patient. Previous information and data has been reviewed and updated as needed. I have reviewed and verified the chief complaint, history, and other documentation. The patient was interviewed and examined at the bedside and the chart reviewed. The previous observations, recommendations, and conclusions were reviewed including those of other providers.     The plan was discussed with the patient and/or family. The patient was given time to ask questions and these questions were answered. At the conclusion of their visit they had no additional questions or concerns and all questions were answered to their satisfaction.    Follow Up   No follow-ups on file.    Patient was given instructions and counseling regarding her condition or for health maintenance advice. Please see specific information pulled into the AVS if appropriate.

## 2022-09-13 ENCOUNTER — LAB (OUTPATIENT)
Dept: ONCOLOGY | Facility: HOSPITAL | Age: 57
End: 2022-09-13

## 2022-09-13 ENCOUNTER — CONSULT (OUTPATIENT)
Dept: ONCOLOGY | Facility: HOSPITAL | Age: 57
End: 2022-09-13

## 2022-09-13 ENCOUNTER — TELEPHONE (OUTPATIENT)
Dept: FAMILY MEDICINE CLINIC | Facility: CLINIC | Age: 57
End: 2022-09-13

## 2022-09-13 VITALS
HEART RATE: 83 BPM | BODY MASS INDEX: 24.91 KG/M2 | OXYGEN SATURATION: 96 % | HEIGHT: 66 IN | TEMPERATURE: 97.9 F | SYSTOLIC BLOOD PRESSURE: 101 MMHG | RESPIRATION RATE: 18 BRPM | WEIGHT: 154.98 LBS | DIASTOLIC BLOOD PRESSURE: 77 MMHG

## 2022-09-13 DIAGNOSIS — B99.9 INFECTION: ICD-10-CM

## 2022-09-13 DIAGNOSIS — I83.012 VENOUS STASIS ULCER OF RIGHT CALF, UNSPECIFIED ULCER STAGE, UNSPECIFIED WHETHER VARICOSE VEINS PRESENT: ICD-10-CM

## 2022-09-13 DIAGNOSIS — L97.229 VENOUS STASIS ULCER OF LEFT CALF, UNSPECIFIED ULCER STAGE, UNSPECIFIED WHETHER VARICOSE VEINS PRESENT: ICD-10-CM

## 2022-09-13 DIAGNOSIS — Z79.4 TYPE 2 DIABETES MELLITUS WITH HYPERGLYCEMIA, WITH LONG-TERM CURRENT USE OF INSULIN: Primary | ICD-10-CM

## 2022-09-13 DIAGNOSIS — S81.809D NON-HEALING WOUND OF LOWER EXTREMITY, SUBSEQUENT ENCOUNTER: ICD-10-CM

## 2022-09-13 DIAGNOSIS — L97.219 VENOUS STASIS ULCER OF RIGHT CALF, UNSPECIFIED ULCER STAGE, UNSPECIFIED WHETHER VARICOSE VEINS PRESENT: ICD-10-CM

## 2022-09-13 DIAGNOSIS — I83.022 VENOUS STASIS ULCER OF LEFT CALF, UNSPECIFIED ULCER STAGE, UNSPECIFIED WHETHER VARICOSE VEINS PRESENT: ICD-10-CM

## 2022-09-13 DIAGNOSIS — Z85.71 HISTORY OF HODGKIN'S LYMPHOMA: ICD-10-CM

## 2022-09-13 DIAGNOSIS — E11.65 TYPE 2 DIABETES MELLITUS WITH HYPERGLYCEMIA, WITH LONG-TERM CURRENT USE OF INSULIN: Primary | ICD-10-CM

## 2022-09-13 PROBLEM — M79.609 LIMB PAIN: Status: ACTIVE | Noted: 2022-09-13

## 2022-09-13 PROBLEM — M54.2 NECK PAIN: Status: ACTIVE | Noted: 2022-09-13

## 2022-09-13 PROBLEM — K63.9 BOWEL DISEASE: Status: ACTIVE | Noted: 2022-09-13

## 2022-09-13 PROBLEM — I73.9 PERIPHERAL VASCULAR DISEASE: Status: ACTIVE | Noted: 2020-01-16

## 2022-09-13 PROBLEM — L90.9 ATROPHY OF SKIN: Status: ACTIVE | Noted: 2020-01-16

## 2022-09-13 PROBLEM — E66.9 OBESITY WITH BODY MASS INDEX 30 OR GREATER: Status: ACTIVE | Noted: 2022-09-13

## 2022-09-13 PROBLEM — R54 FRAILTY: Status: ACTIVE | Noted: 2021-04-29

## 2022-09-13 PROBLEM — G98.8 NEUROLOGIC DISORDER: Status: ACTIVE | Noted: 2022-09-13

## 2022-09-13 PROBLEM — L03.115 CELLULITIS OF RIGHT FOOT: Status: ACTIVE | Noted: 2022-09-13

## 2022-09-13 PROBLEM — N32.9 BLADDER DISORDER: Status: ACTIVE | Noted: 2022-09-13

## 2022-09-13 PROBLEM — N63.0 BREAST LUMP: Status: ACTIVE | Noted: 2022-09-13

## 2022-09-13 PROBLEM — K44.9 HIATAL HERNIA: Status: ACTIVE | Noted: 2020-09-16

## 2022-09-13 LAB
ALBUMIN SERPL-MCNC: 4.1 G/DL (ref 3.5–5.2)
ALBUMIN/GLOB SERPL: 1 G/DL
ALP SERPL-CCNC: 105 U/L (ref 39–117)
ALT SERPL W P-5'-P-CCNC: 9 U/L (ref 1–33)
ANION GAP SERPL CALCULATED.3IONS-SCNC: 8.7 MMOL/L (ref 5–15)
AST SERPL-CCNC: 12 U/L (ref 1–32)
BASOPHILS # BLD AUTO: 0.01 10*3/MM3 (ref 0–0.2)
BASOPHILS NFR BLD AUTO: 0.2 % (ref 0–1.5)
BILIRUB SERPL-MCNC: 0.2 MG/DL (ref 0–1.2)
BUN SERPL-MCNC: 11 MG/DL (ref 6–20)
BUN/CREAT SERPL: 20 (ref 7–25)
CALCIUM SPEC-SCNC: 9.3 MG/DL (ref 8.6–10.5)
CHLORIDE SERPL-SCNC: 99 MMOL/L (ref 98–107)
CO2 SERPL-SCNC: 30.3 MMOL/L (ref 22–29)
CREAT SERPL-MCNC: 0.55 MG/DL (ref 0.57–1)
DEPRECATED RDW RBC AUTO: 43.5 FL (ref 37–54)
EGFRCR SERPLBLD CKD-EPI 2021: 107.7 ML/MIN/1.73
EOSINOPHIL # BLD AUTO: 0.11 10*3/MM3 (ref 0–0.4)
EOSINOPHIL NFR BLD AUTO: 2.5 % (ref 0.3–6.2)
ERYTHROCYTE [DISTWIDTH] IN BLOOD BY AUTOMATED COUNT: 14.2 % (ref 12.3–15.4)
ERYTHROCYTE [SEDIMENTATION RATE] IN BLOOD: 36 MM/HR (ref 0–30)
GLOBULIN UR ELPH-MCNC: 4.2 GM/DL
GLUCOSE SERPL-MCNC: 147 MG/DL (ref 65–99)
HCT VFR BLD AUTO: 41.5 % (ref 34–46.6)
HGB BLD-MCNC: 13.1 G/DL (ref 12–15.9)
IMM GRANULOCYTES # BLD AUTO: 0.01 10*3/MM3 (ref 0–0.05)
IMM GRANULOCYTES NFR BLD AUTO: 0.2 % (ref 0–0.5)
LYMPHOCYTES # BLD AUTO: 1.16 10*3/MM3 (ref 0.7–3.1)
LYMPHOCYTES NFR BLD AUTO: 26.4 % (ref 19.6–45.3)
MCH RBC QN AUTO: 26.4 PG (ref 26.6–33)
MCHC RBC AUTO-ENTMCNC: 31.6 G/DL (ref 31.5–35.7)
MCV RBC AUTO: 83.5 FL (ref 79–97)
MONOCYTES # BLD AUTO: 0.41 10*3/MM3 (ref 0.1–0.9)
MONOCYTES NFR BLD AUTO: 9.3 % (ref 5–12)
NEUTROPHILS NFR BLD AUTO: 2.69 10*3/MM3 (ref 1.7–7)
NEUTROPHILS NFR BLD AUTO: 61.4 % (ref 42.7–76)
NRBC BLD AUTO-RTO: 0 /100 WBC (ref 0–0.2)
PLATELET # BLD AUTO: 197 10*3/MM3 (ref 140–450)
PMV BLD AUTO: 9.9 FL (ref 6–12)
POTASSIUM SERPL-SCNC: 4.1 MMOL/L (ref 3.5–5.2)
PROT SERPL-MCNC: 8.3 G/DL (ref 6–8.5)
RBC # BLD AUTO: 4.97 10*6/MM3 (ref 3.77–5.28)
SODIUM SERPL-SCNC: 138 MMOL/L (ref 136–145)
WBC NRBC COR # BLD: 4.39 10*3/MM3 (ref 3.4–10.8)

## 2022-09-13 PROCEDURE — G0463 HOSPITAL OUTPT CLINIC VISIT: HCPCS | Performed by: INTERNAL MEDICINE

## 2022-09-13 PROCEDURE — 99214 OFFICE O/P EST MOD 30 MIN: CPT | Performed by: INTERNAL MEDICINE

## 2022-09-13 PROCEDURE — 80053 COMPREHEN METABOLIC PANEL: CPT

## 2022-09-13 PROCEDURE — 36415 COLL VENOUS BLD VENIPUNCTURE: CPT

## 2022-09-13 PROCEDURE — 85652 RBC SED RATE AUTOMATED: CPT

## 2022-09-13 PROCEDURE — 85025 COMPLETE CBC W/AUTO DIFF WBC: CPT

## 2022-09-13 NOTE — TELEPHONE ENCOUNTER
Pt called back about supplies. Unsure what she is referring to. Called back, no answer, left message.

## 2022-09-13 NOTE — TELEPHONE ENCOUNTER
Patient called and asked if orders have been put in for curlex, gloves and etc for infection in her leg. She was asking to speak to Leslie who is not in office today. Told patient I would take a message and return phone call when we can.

## 2022-09-14 RX ORDER — MAGNESIUM HYDROXIDE 1200 MG/15ML
250 LIQUID ORAL DAILY
Qty: 1000 ML | Refills: 5 | Status: SHIPPED | OUTPATIENT
Start: 2022-09-14 | End: 2022-09-29

## 2022-09-14 NOTE — TELEPHONE ENCOUNTER
Asking for many dressing orders since she is not current with home health (new referral being worked currently) and does not see wound care.    Pt needs: kerlex (cloth), ace bandages, abx spray/cleansear (PLEASE ADVISE AND ORDER), a bandage (soft, barrier) 4x4, and gloves.    Dme orders below. Please sign.

## 2022-09-15 ENCOUNTER — PATIENT OUTREACH (OUTPATIENT)
Dept: CASE MANAGEMENT | Facility: CLINIC | Age: 57
End: 2022-09-15

## 2022-09-15 ENCOUNTER — PATIENT ROUNDING (BHMG ONLY) (OUTPATIENT)
Dept: ONCOLOGY | Facility: HOSPITAL | Age: 57
End: 2022-09-15

## 2022-09-15 NOTE — OUTREACH NOTE
Social Work Assessment  Questions/Answers    Flowsheet Row Most Recent Value   Referral Source outpatient staff, outpatient clinic, physician   Reason for Consult housing concerns/homeless   Preferred Language English   People in Home child(felix), adult   Current Living Arrangements apartment   Primary Care Provided by self   Provides Primary Care For no one   Source of Income disability, social security        [facility] updated and reviewed with the patient during this program:  Financial Resource Strain: Medium Risk   • Difficulty of Paying Living Expenses: Somewhat hard      Food Insecurity: No Food Insecurity   • Worried About Running Out of Food in the Last Year: Never true   • Ran Out of Food in the Last Year: Never true      Transportation Needs: Unmet Transportation Needs   • Lack of Transportation (Medical): Yes   • Lack of Transportation (Non-Medical): Yes      Housing Stability: Low Risk    • Unable to Pay for Housing in the Last Year: No   • Number of Places Lived in the Last Year: 1   • Unstable Housing in the Last Year: No        Patient Outreach    SW received referral via [facility] outreach screening tool. SW contacted patient via telephone to discus concerns. Patient is currently living with her adult daughter but is seeking new housing. SW provided patient with list of low income housing in [facility] to review via e-mail. Will continue to follow and assist, as needed.     DELMY RUIZ -   Ambulatory Case Management    9/15/2022, 13:01 EDT

## 2022-09-15 NOTE — PROGRESS NOTES
September 15, 2022    Hello, may I speak with Isha Llanes?    My name is Barb.    I am  with [facility]  [address] [place] [address] AVE  [place] [address] [phone].    Before we get started may I verify your date of birth? 1965    I am calling to officially welcome you to our practice and ask about your recent visit. Is this a good time to talk? NO- Left Voicemail    Tell me about your visit with us. What things went well?       We're always looking for ways to make our patients' experiences even better. Do you have recommendations on ways we may improve?  NO- Left Voicemail    Overall were you satisfied with your first visit to our practice? NO- Left Voicemail       I appreciate you taking the time to speak with me today. Is there anything else I can do for you? NO- Left Voicemail      Thank you, and have a great day.

## 2022-09-16 ENCOUNTER — OFFICE VISIT (OUTPATIENT)
Dept: FAMILY MEDICINE CLINIC | Facility: CLINIC | Age: 57
End: 2022-09-16

## 2022-09-16 VITALS
OXYGEN SATURATION: 95 % | TEMPERATURE: 98.7 F | HEART RATE: 79 BPM | DIASTOLIC BLOOD PRESSURE: 58 MMHG | HEIGHT: 66 IN | BODY MASS INDEX: 25.01 KG/M2 | SYSTOLIC BLOOD PRESSURE: 118 MMHG

## 2022-09-16 DIAGNOSIS — Z23 NEED FOR INFLUENZA VACCINATION: ICD-10-CM

## 2022-09-16 DIAGNOSIS — Z79.4 TYPE 2 DIABETES MELLITUS WITH HYPERGLYCEMIA, WITH LONG-TERM CURRENT USE OF INSULIN: ICD-10-CM

## 2022-09-16 DIAGNOSIS — Z11.59 ENCOUNTER FOR HEPATITIS C SCREENING TEST FOR LOW RISK PATIENT: ICD-10-CM

## 2022-09-16 DIAGNOSIS — E11.65 TYPE 2 DIABETES MELLITUS WITH HYPERGLYCEMIA, WITH LONG-TERM CURRENT USE OF INSULIN: ICD-10-CM

## 2022-09-16 DIAGNOSIS — L30.9 ECZEMA, UNSPECIFIED TYPE: Primary | ICD-10-CM

## 2022-09-16 DIAGNOSIS — L97.219 VENOUS STASIS ULCER OF RIGHT CALF, UNSPECIFIED ULCER STAGE, UNSPECIFIED WHETHER VARICOSE VEINS PRESENT: ICD-10-CM

## 2022-09-16 DIAGNOSIS — L97.229 VENOUS STASIS ULCER OF LEFT CALF, UNSPECIFIED ULCER STAGE, UNSPECIFIED WHETHER VARICOSE VEINS PRESENT: ICD-10-CM

## 2022-09-16 DIAGNOSIS — I83.022 VENOUS STASIS ULCER OF LEFT CALF, UNSPECIFIED ULCER STAGE, UNSPECIFIED WHETHER VARICOSE VEINS PRESENT: ICD-10-CM

## 2022-09-16 DIAGNOSIS — I83.012 VENOUS STASIS ULCER OF RIGHT CALF, UNSPECIFIED ULCER STAGE, UNSPECIFIED WHETHER VARICOSE VEINS PRESENT: ICD-10-CM

## 2022-09-16 PROCEDURE — 99214 OFFICE O/P EST MOD 30 MIN: CPT | Performed by: NURSE PRACTITIONER

## 2022-09-16 PROCEDURE — 86803 HEPATITIS C AB TEST: CPT | Performed by: NURSE PRACTITIONER

## 2022-09-16 PROCEDURE — 83036 HEMOGLOBIN GLYCOSYLATED A1C: CPT | Performed by: NURSE PRACTITIONER

## 2022-09-16 RX ORDER — GINSENG 100 MG
1 CAPSULE ORAL 2 TIMES DAILY
Qty: 28 G | Refills: 5 | Status: SHIPPED | OUTPATIENT
Start: 2022-09-16 | End: 2022-10-24 | Stop reason: SDUPTHER

## 2022-09-16 NOTE — PROGRESS NOTES
"Chief Complaint  Diabetes and Wound Check    Subjective        Isha Llanes presents to [facility]  History of Present Illness  Presents today for follow-up on bilateral lower extremity venous stasis ulcers.  She has completed round of antibiotics.  Home health is coming out to her house.  She was not home several times with statin appointments with home health.  Home health discharge patient due to noncompliance.  Recently reordered dressing supplies for her lower extremities.  She reports she is out of the bacitracin.  She reports having some dry scaly rash on her elbows.    Recently established care with hematology oncology.  She has a history of Hodgkin's lymphoma.  She had an abnormal CT scan of her lungs.  She has a PET scan ordered in October.    Her anxiety is really high.  She has a consultation with psychiatry next week.    Type II diabetic insulin-dependent.  She is has not seen  her endocrinologist for a while.     Objective   Vital Signs:  /58 (BP Location: Left arm, Patient Position: Sitting)   Pulse 79   Temp 98.7 °F (37.1 °C)   Ht 167.6 cm (66\")   SpO2 95%   BMI 25.01 kg/m²   Estimated body mass index is 25.01 kg/m² as calculated from the following:    Height as of this encounter: 167.6 cm (66\").    Weight as of 9/13/22: 70.3 kg (154 lb 15.7 oz).          Physical Exam  Vitals reviewed.   Constitutional:       Appearance: Normal appearance. She is well-developed.   HENT:      Head: Normocephalic and atraumatic.      Right Ear: External ear normal.      Left Ear: External ear normal.      Mouth/Throat:      Pharynx: No oropharyngeal exudate.   Eyes:      Conjunctiva/sclera: Conjunctivae normal.      Pupils: Pupils are equal, round, and reactive to light.   Cardiovascular:      Rate and Rhythm: Normal rate and regular rhythm.      Heart sounds: No murmur heard.    No friction rub. No gallop.   Pulmonary:      Effort: Pulmonary effort is normal.      " Breath sounds: Normal breath sounds. No wheezing or rhonchi.   Abdominal:      General: Bowel sounds are normal. There is no distension.      Palpations: Abdomen is soft.      Tenderness: There is no abdominal tenderness.   Skin:     General: Skin is warm and dry.      Comments: Lower extremities are Ace wrapped   Neurological:      Mental Status: She is alert and oriented to person, place, and time.   Psychiatric:         Mood and Affect: Affect normal. Mood is anxious and depressed.         Behavior: Behavior normal.         Thought Content: Thought content normal.         Judgment: Judgment normal.        Result Review :    Common labs    Common Labsle 8/8/22 8/8/22 9/5/22 9/5/22 9/13/22 9/13/22    1330 1330 2046 2046 1051 1051   Glucose  137 (A)  127 (A)  147 (A)   BUN  10  13  11   Creatinine  0.56 (A)  0.48 (A)  0.55 (A)   Sodium  140  137  138   Potassium  4.1  4.1  4.1   Chloride  101  98  99   Calcium  9.2  9.3  9.3   Albumin  3.90  4.00  4.10   Total Bilirubin  0.2  0.3  0.2   Alkaline Phosphatase  91  99  105   AST (SGOT)  13  11  12   ALT (SGPT)  8  8  9   WBC 4.36  6.39  4.39    Hemoglobin 12.2  13.2  13.1    Hematocrit 38.9  40.8  41.5    Platelets 145  187  197    (A) Abnormal value       Comments are available for some flowsheets but are not being displayed.                     Assessment and Plan   Diagnoses and all orders for this visit:    1. Eczema, unspecified type (Primary)  -     triamcinolone (KENALOG) 0.1 % ointment; Apply  topically to the appropriate area as directed 2 (Two) Times a Day.  Dispense: 30 g; Refill: 0    2. Venous stasis ulcer of right calf, unspecified ulcer stage, unspecified whether varicose veins present (HCC)  -     Benzethonium Chloride (Dermal Wound Cleanser) 0.13 % liquid; Spray on wounds daily as needed  Dispense: 236 mL; Refill: 1  -     bacitracin 500 UNIT/GM ointment; Apply 1 application topically to the appropriate area as directed 2 (Two) Times a Day.  Dispense:  28 g; Refill: 5    3. Venous stasis ulcer of left calf, unspecified ulcer stage, unspecified whether varicose veins present (HCC)  -     Benzethonium Chloride (Dermal Wound Cleanser) 0.13 % liquid; Spray on wounds daily as needed  Dispense: 236 mL; Refill: 1  -     bacitracin 500 UNIT/GM ointment; Apply 1 application topically to the appropriate area as directed 2 (Two) Times a Day.  Dispense: 28 g; Refill: 5    4. Need for influenza vaccination  Comments:  declines    5. Encounter for hepatitis C screening test for low risk patient  -     Hepatitis C Antibody    6. Type 2 diabetes mellitus with hyperglycemia, with long-term current use of insulin (HCC)  -     Hemoglobin A1c    Order triamcinolone cream for her eczema on her elbows.  Continue doing dressing changes on legs.  Ordered dermal wound cleanser and apply bacitracin ointment to the ulcerations.  She declines influenza vaccine.  We will check for hepatitis C and hemoglobin A1c.  Follow-up with endocrinologist on type 2 diabetes.         Follow Up   No follow-ups on file.  Patient was given instructions and counseling regarding her condition or for health maintenance advice. Please see specific information pulled into the AVS if appropriate.

## 2022-09-17 LAB
HBA1C MFR BLD: 6.6 % (ref 4.8–5.6)
HCV AB SER DONR QL: REACTIVE

## 2022-09-19 DIAGNOSIS — R76.8 POSITIVE HEPATITIS C ANTIBODY TEST: Primary | ICD-10-CM

## 2022-09-19 RX ORDER — FUROSEMIDE 40 MG/1
40 TABLET ORAL DAILY
Qty: 90 TABLET | Refills: 1 | Status: SHIPPED | OUTPATIENT
Start: 2022-09-19 | End: 2023-03-28

## 2022-09-19 NOTE — TELEPHONE ENCOUNTER
PATIENT SAID THAT GEORGE WAS SUPPOSED TO SEND A PRESCRIPTION OVER FOR ENSURE AND WANTS TO KNOW WHERE ITS AT. PATIENT ALSO STATED SHE HAS CONJUNCTIVITIS IN HER EYES BAD IN THE MORNING AND WOULD LIKE SOMETHING SENT TO PHARMACY.

## 2022-09-20 ENCOUNTER — PATIENT OUTREACH (OUTPATIENT)
Dept: CASE MANAGEMENT | Facility: OTHER | Age: 57
End: 2022-09-20

## 2022-09-20 DIAGNOSIS — E11.65 TYPE 2 DIABETES MELLITUS WITH HYPERGLYCEMIA, WITH LONG-TERM CURRENT USE OF INSULIN: Primary | ICD-10-CM

## 2022-09-20 DIAGNOSIS — F32.A DEPRESSION, UNSPECIFIED DEPRESSION TYPE: ICD-10-CM

## 2022-09-20 DIAGNOSIS — Z79.4 TYPE 2 DIABETES MELLITUS WITH HYPERGLYCEMIA, WITH LONG-TERM CURRENT USE OF INSULIN: Primary | ICD-10-CM

## 2022-09-20 DIAGNOSIS — F41.1 GENERALIZED ANXIETY DISORDER: ICD-10-CM

## 2022-09-20 RX ORDER — ERYTHROMYCIN 5 MG/G
OINTMENT OPHTHALMIC 2 TIMES DAILY
Qty: 1 G | Refills: 0 | Status: SHIPPED | OUTPATIENT
Start: 2022-09-20 | End: 2022-10-21

## 2022-09-20 RX ORDER — ONDANSETRON 4 MG/1
4 TABLET, ORALLY DISINTEGRATING ORAL 4 TIMES DAILY PRN
Qty: 30 TABLET | Refills: 0 | Status: SHIPPED | OUTPATIENT
Start: 2022-09-20 | End: 2022-10-19 | Stop reason: SDUPTHER

## 2022-09-20 NOTE — TELEPHONE ENCOUNTER
Supplemental drink order faxed over a week ago. I re-faxed this AM to [facility] at 0930.  PCP sent in eye ointment.  Called pt, no answer, left detailed message with all this info.

## 2022-09-20 NOTE — TELEPHONE ENCOUNTER
Spoke with pt and relayed this info. She agreed to call [facility] at     Pt is going to have to remake her ortho appt, did not have transportation. Contact info given.    Reminded of psych 9/22 appt.    SW referral entered again for pt getting evicted. Unknown date.    Pt requesting Zofran refill, sent to PCP.

## 2022-09-20 NOTE — OUTREACH NOTE
AMBULATORY CASE MANAGEMENT NOTE    Name and Relationship of Patient/Support Person: Isha Llanes Self    Discussed appts with pt.  PET on 10-4 at 0815  Pt remaking ortho and psych appts, no transportation.    Education Documentation  No documentation found.    [facility] Interim Update    Discussed appts and referrals.        CHARLOTTE GOODMAN  Ambulatory Case Management    9/20/2022, 10:17 EDT

## 2022-09-21 NOTE — TELEPHONE ENCOUNTER
Caller: Isha Llanes    Relationship: Self    Best call back number: [phone]    Requested Prescriptions:   Requested Prescriptions     Pending Prescriptions Disp Refills   • clopidogrel (Plavix) 75 MG tablet 90 tablet 0     Sig: Take 1 tablet by mouth Daily.        Pharmacy where request should be sent: [facility] - [place], [place] - [address] - [phone]  - [phone] FX    Does the patient have less than a 3 day supply:  [] Yes  [x] No    Maximilian Schumacher Rep   09/21/22 15:12 EDT

## 2022-09-22 ENCOUNTER — PATIENT OUTREACH (OUTPATIENT)
Dept: CASE MANAGEMENT | Facility: OTHER | Age: 57
End: 2022-09-22

## 2022-09-22 ENCOUNTER — TELEPHONE (OUTPATIENT)
Dept: ONCOLOGY | Facility: HOSPITAL | Age: 57
End: 2022-09-22

## 2022-09-22 RX ORDER — CLOPIDOGREL BISULFATE 75 MG/1
75 TABLET ORAL DAILY
Qty: 90 TABLET | Refills: 0 | Status: SHIPPED | OUTPATIENT
Start: 2022-09-22 | End: 2023-02-27 | Stop reason: SDUPTHER

## 2022-09-22 NOTE — OUTREACH NOTE
Patient Outreach    MSW spoke with patient. Patient is living with her daughter and plans to stay there until she can find her own housing. Patient was sent a list of apartments in the [place] area. Patient states that she is able to stay where she is until she can find her own place. MSW sent housing directory via email to patient at [email or web address].    ANA M RUIZ -   Ambulatory Case Management    9/22/2022, 14:06 EDT   Rifampin Pregnancy And Lactation Text: This medication is Pregnancy Category C and it isn't know if it is safe during pregnancy. It is also excreted in breast milk and should not be used if you are breast feeding.

## 2022-09-22 NOTE — TELEPHONE ENCOUNTER
Called patient back and explained that Dr. Haywood would not prescribe Xanax for her at this time and that she would need to speak with her PCP or Pain Management doctor about getting that prescription. Patient became upset stating that her PCP does not give those medication and neither does her pain management. She states that she does not understand why Dr. Haywood would not give her this medication. Advised that this could void her pain management contract. She states that she knows it will not. I affirmed that Dr. Haywood would not give her this script, she thanked me and disconnected the call.

## 2022-09-22 NOTE — TELEPHONE ENCOUNTER
Caller: Isha Llanes    Relationship: Self    Best call back number: [phone]    What is the best time to reach you: ANYTIME    Who are you requesting to speak with (clinical staff, provider,  specific staff member): DR CASTELLANOS OR NURSE    What was the call regarding: PT STATED SHE HAS SOME QUESTIONS/CONCERNS AND WOULD LIKE TO SPEAK WITH DR CASTELLANOS.     PLEASE CALL ASAP TO ADVISE. PT IS VERY ANXIOUS.    Do you require a callback: YES

## 2022-09-22 NOTE — TELEPHONE ENCOUNTER
Returned call regarding messages that the patient had left. She states that since she discussed the possibilities with Dr. Haywood she has become very nervous and anxious, leading to outbursts at family and nightmares. Would like to see about getting a prescription for Xanax to help until all testing is completed. Advised that I will discuss with Dr. Haywood and let her know what the decision is on the medication. Patient states that she would like the script to go to [place] [id number] if it is decided that she can have it.

## 2022-09-29 ENCOUNTER — PATIENT OUTREACH (OUTPATIENT)
Dept: CASE MANAGEMENT | Facility: OTHER | Age: 57
End: 2022-09-29

## 2022-09-29 ENCOUNTER — TELEPHONE (OUTPATIENT)
Dept: FAMILY MEDICINE CLINIC | Facility: CLINIC | Age: 57
End: 2022-09-29

## 2022-09-29 DIAGNOSIS — F32.A DEPRESSION, UNSPECIFIED DEPRESSION TYPE: ICD-10-CM

## 2022-09-29 DIAGNOSIS — F41.1 GENERALIZED ANXIETY DISORDER: ICD-10-CM

## 2022-09-29 DIAGNOSIS — E11.65 TYPE 2 DIABETES MELLITUS WITH HYPERGLYCEMIA, WITH LONG-TERM CURRENT USE OF INSULIN: Primary | ICD-10-CM

## 2022-09-29 DIAGNOSIS — Z79.4 TYPE 2 DIABETES MELLITUS WITH HYPERGLYCEMIA, WITH LONG-TERM CURRENT USE OF INSULIN: Primary | ICD-10-CM

## 2022-09-29 PROCEDURE — 99489 CPLX CHRNC CARE EA ADDL 30: CPT | Performed by: NURSE PRACTITIONER

## 2022-09-29 PROCEDURE — 99487 CPLX CHRNC CARE 1ST 60 MIN: CPT | Performed by: NURSE PRACTITIONER

## 2022-09-29 NOTE — TELEPHONE ENCOUNTER
Caller: Isha Llanes    Relationship: Self    Best call back number: [phone]    What is the best time to reach you: ANYTIME    Who are you requesting to speak with (clinical staff, provider,  specific staff member): CLINICAL    What was the call regarding: THE PATIENT STATED SHE NEEDS HER LEG WRAPPINGS RE-ORDERED. PATIENT STATED THIS IS ORDERED THROUGH [facility]    Do you require a callback: YES

## 2022-09-29 NOTE — OUTREACH NOTE
[facility] End of Month Documentation    This Chronic Medical Management Care Plan for Isha Llanes, 56 y.o. female, has been monitored and managed and a new plan of care implemented for the month of September.  A cumulative time of 166  minutes was spent on this patient record this month, including phone call with patient; chart review; electronic communication with primary care provider; phone call with other providers; face to face with provider.    Regarding the patient's problems: has Cancer associated pain; Presence of intrathecal pump; Chronic low back pain with bilateral sciatica; Sacroiliac inflammation ([facility]); Chronic pain syndrome; Pelvic pain; Aortic stenosis, severe; Dyspnea on effort; Other pulmonary embolism without acute cor pulmonale ([facility]); Nonrheumatic aortic valve stenosis; Hip pain; Anxiety disorder; Allergic rhinitis due to allergen; Arthritis; Asthma; Kidney disease; CHF (congestive heart failure) ([facility]); Chronic obstructive pulmonary disease (HCC); Diabetes mellitus, type 2 (HCC); Depression; GERD (gastroesophageal reflux disease); S/P TAVR (transcatheter aortic valve replacement); Limited mobility; Venous hypertension of both lower extremities; Nicotine dependence; Mitral valve prolapse; Lupus (systemic lupus erythematosus) ([facility]); Long term current use of anticoagulant therapy; History of Hodgkin's lymphoma; Hepatic steatosis; Heart murmur; CAD (coronary artery disease); Non-healing wound of lower extremity, subsequent encounter; MSSA (methicillin susceptible Staphylococcus aureus) infection; Sepsis ([facility]); Change in bowel habits; Nausea; Venous stasis ulcer of right calf (HCC); Venous stasis ulcer of left calf (HCC); Noncompliance; Peripheral vascular disease ([facility]); Obesity with body mass index 30 or greater; Neurologic disorder; Neck pain; Limb pain; Hiatal hernia; Bladder disorder; Frailty; Cellulitis of right foot; Bowel disease; Atrophy of skin; and Breast lump on their problem list., the  "following items were addressed: medical records; medications and any changes can be found within the plan section of the note.  A detailed listing of time spent for chronic care management is tracked within each outreach encounter.  Current medications include:  has a current medication list which includes the following prescription(s): acetaminophen, b-d uf iii mini pen needles, bacitracin, dermal wound cleanser, clopidogrel, continuous glucose monitor sup, ace bandage self-adhering, ace elastic bandage 4\", epinephrine, erythromycin, flovent hfa, freestyle lite, furosemide, gabapentin, hydromorphone, insulin lispro, ipratropium-albuterol, freestyle, lantus solostar, lidocaine, ondansetron odt, plenvu, proair hfa, probiotic (lactobacillus), saccharomyces boulardii, clenpiq, sodium chloride, and triamcinolone, and the following Facility-Administered Medications: sterile water. and the patient is reported to be patient is compliant with medication protocol, non compliant with making transportation for appts,  Medications are reported to be non-effective in controlling symptoms and changes have been made to the medication protocol.  Regarding these diagnoses, referrals were made to the following provider(s):  Hem/onc.  All notes on chart for PCP to review.    The patient was monitored remotely for activity level; medications; blood glucose.    The patient's physical needs include:  needs assistance with ADLs; physical healthcare; physician referral.     The patient's mental support needs include:  not applicable    The patient's cognitive support needs include:  not applicable    The patient's psychosocial support needs include:  need for increased support    The patient's functional needs include: physician referral; physical healthcare    The patient's environmental needs include:  no access to transportation, uses [employer]    Care Plan overall comments:  No data recorded    Refer to previous outreach notes for more " information on the areas listed above.    Monthly Billing Diagnoses  (E11.65,  Z79.4) Type 2 diabetes mellitus with hyperglycemia, with long-term current use of insulin (HCC)    (F41.1) Generalized anxiety disorder    (F32.A) Depression, unspecified depression type    Medications   · Medications have been reconciled    Care Plan progress this month:      Recently Modified Care Plans Updates made since 8/29/2022 12:00 AM    No recently modified care plans.              Instructions   · Patient was provided an electronic copy of care plan  · CCM services were explained and offered and patient has accepted these services.  · Patient has given their written consent to receive CCM services and understands that this includes the authorization of electronic communication of medical information with the other treating providers.  · Patient understands that they may stop CCM services at any time and these changes will be effective at the end of the calendar month and will effectively revocate the agreement of CCM services.  · Patient understands that only one practitioner can furnish and be paid for CCM services during one calendar month.  Patient also understands that there may be co-payment or deductible fees in association with CCM services.  · Patient will continue with at least monthly follow-up calls with the Nurse Navigator.    CHARLOTTE GOODMAN  Ambulatory Case Management    9/29/2022, 13:56 EDT

## 2022-09-30 NOTE — OUTREACH NOTE
AMBULATORY CASE MANAGEMENT NOTE    Name and Relationship of Patient/Support Person: [facility] -     Pt canceled scope today with Dr Orlando due to inability to complete GI prep. It was too harsh on pt, got dehydrated and weak. educated pt on hydrating and eating.    Pt will likely move out of bug infested home next Tuesday.    Education Documentation  No documentation found.        CHARLOTTE GOODMAN  Ambulatory Case Management    9/30/2022, 12:37 EDT

## 2022-09-30 NOTE — TELEPHONE ENCOUNTER
Spoke to [facility] rep who stated pt was given a 30 day supply and will not have a shipment until 10-14. Pt should not have run out of supplies yet, per staff. Pt has to call before needing more supplies. Will give her the info when she calls back.

## 2022-09-30 NOTE — TELEPHONE ENCOUNTER
Per chart, DME I ordered has refills and #90 supply. It is likely pt needs to call [facility] and ask for shipment to be sent. They do not open until 0900. Will call then.

## 2022-09-30 NOTE — TELEPHONE ENCOUNTER
Pt's dme orders updated with increased supply amount, re faxed to [facility]. Pt verbalized understanding.

## 2022-10-03 ENCOUNTER — PATIENT OUTREACH (OUTPATIENT)
Dept: CASE MANAGEMENT | Facility: OTHER | Age: 57
End: 2022-10-03

## 2022-10-03 DIAGNOSIS — E11.65 TYPE 2 DIABETES MELLITUS WITH HYPERGLYCEMIA, WITH LONG-TERM CURRENT USE OF INSULIN: Primary | ICD-10-CM

## 2022-10-03 DIAGNOSIS — Z79.4 TYPE 2 DIABETES MELLITUS WITH HYPERGLYCEMIA, WITH LONG-TERM CURRENT USE OF INSULIN: Primary | ICD-10-CM

## 2022-10-03 DIAGNOSIS — F41.1 GENERALIZED ANXIETY DISORDER: ICD-10-CM

## 2022-10-03 NOTE — OUTREACH NOTE
AMBULATORY CASE MANAGEMENT NOTE    Name and Relationship of Patient/Support Person: Isha Llanes M - Self    Pt stated her grandson has/had the flu and now she is ill with diarrhea and stomach upset. Educated on OTC symptom management and to call office/me if s/s worsen. She agreed.    [facility] has not delivered her wound care supplies. Pt does not want to initiate HH or wound care at this time. Advised her to call [facility] for update since I faxed orders last week.    Pt c/o worsening depression. No thoughts of harming herself or others. When I offered resources.suicide hotline, she cut me off multiple times, stating she did not need the info. Reminded pt of psych appt in 3 weeks.    Pt has imaging tomorrow. Anxious about this.        Education Documentation  No documentation found.        CHARLOTTE GOODMAN  Ambulatory Case Management    10/3/2022, 14:18 EDT

## 2022-10-04 ENCOUNTER — HOSPITAL ENCOUNTER (OUTPATIENT)
Dept: PET IMAGING | Facility: HOSPITAL | Age: 57
Discharge: HOME | End: 2022-10-04

## 2022-10-04 DIAGNOSIS — Z85.71 HISTORY OF HODGKIN'S LYMPHOMA: ICD-10-CM

## 2022-10-04 PROCEDURE — 78815 PET IMAGE W/CT SKULL-THIGH: CPT

## 2022-10-04 PROCEDURE — 0 FLUDEOXYGLUCOSE F18 SOLUTION: Performed by: INTERNAL MEDICINE

## 2022-10-04 PROCEDURE — A9552 F18 FDG: HCPCS | Performed by: INTERNAL MEDICINE

## 2022-10-04 RX ADMIN — FLUDEOXYGLUCOSE F18 1 DOSE: 300 INJECTION INTRAVENOUS at 08:30

## 2022-10-05 ENCOUNTER — OFFICE VISIT (OUTPATIENT)
Dept: ONCOLOGY | Facility: HOSPITAL | Age: 57
End: 2022-10-05

## 2022-10-05 DIAGNOSIS — J43.9 PULMONARY EMPHYSEMA, UNSPECIFIED EMPHYSEMA TYPE: Primary | ICD-10-CM

## 2022-10-05 DIAGNOSIS — Z85.71 HISTORY OF HODGKIN'S LYMPHOMA: ICD-10-CM

## 2022-10-05 PROCEDURE — 99442 PR PHYS/QHP TELEPHONE EVALUATION 11-20 MIN: CPT | Performed by: INTERNAL MEDICINE

## 2022-10-05 PROCEDURE — G0463 HOSPITAL OUTPT CLINIC VISIT: HCPCS | Performed by: INTERNAL MEDICINE

## 2022-10-05 NOTE — PROGRESS NOTES
Chief Complaint/Care Team   Neck Nodule hx of Hodgkins Lymphoma     Vidal Luevano APRN Lowder, Bradley, APRN    TELEHEALTH DISCLAIMER  You have chosen to receive care through a telephone visit. Do you consent to use a telephone visit for your medical care today?  YES  This visit has been scheduled or rescheduled as a phone visit to comply with patient safety & health concerns in accordance with CDC recommendations.    Total time of discussion was 15 minutes.     History of Present Illness     Diagnosis: Hodgkin's Lymphoma Stage III diagnosed 2008    Current Treatment: Active Surveillance  Previous Treatment:   -In 2008, s/p 3 cycles of ABVD (declined further treatment after 3 cycles, but was in remission)  -treated at [facility] after Right groin LN seen and B symptoms with 4 additional cycles of ABVD with last 2 cycles without Bleomycin  -Right inguinal LN PET Avid treated with RT from 2/2012-3/2012 with 30 Gy  -Active Surveillance from [id number], scanned records from Dr. Teodoro Mancuso ([facility]) from 11/2/2021 stated pt underwent PET CT scan which was negative for evidence of recurrence    History of Present Illness:     Isha Llanes is a 56 y.o. female who presents to [facility]EMATOLOGY [facility] ONCOL[facility]Y for follow up regarding history of Hodgkin's Lymphoma stage III s/p treatment with ABVD diagnosed 2008.      Per scanned records from Dr. Shala Norris, (pt reported she was initially treated for a spider bite until lesion was biopsied and revealed Hodgkin's lympoma), treated in 2008     when she had nodular sclerosing Hodgkin's lymphoma stage III.  She received 3     cycles of ABVD.  She refused further treatment as she could not tolerate it.      She did go into remission with the 3 cycles which remained for a while.  From     February 2011 to October 2011 she was evaluated at [facility] when     she had right groin lymph node with B symptoms,  but she reports receiving treatment in [place], [place] by Dr. Casey.  She received 4 additional     cycles of ABVD the last 2 without bleomycin.  She had a solitary lymph node residual in the right inguinal region which was PET avid and was treated with     radiation therapy February 2012 to March 2012 30 Gy.  She has been followed     since then with CAT scans and PET scans which show small nonpathologically     enlarged lymph nodes.  She says she also got chemotherapy with Dr. Gaming before and had been following up with Dr. Paredes prior to both providers leaving their practice.  Most recently per scanned clinic note from Dr. Teodoro Mancuso (private heme-onc practice in [facility]), patient with PET/CT around November 2021 which did not reveal any evidence of cancer recurrence.       Patient has past medical history significant for anxiety, chronic ulcers in her lower extremities secondary to poorly controlled diabetes/chronic venous stasis, diabetes mellitus with use of continuous glucose monitor.  Patient's pain is currently managed with Dr. Boudreaux in [place] and she has a Dilaudid pain pump implanted in her right lower back.  She reports Dr. Boudreaux was planning to do a dye study her pain pump to assess whether or not the pump is functioning properly.    Patient reports being wheelchair-bound for several months to years reports difficulty standing up and difficulty walking long distances secondary to weakness in her lower extremities.  Reports being able to complete her ADLs, but does not drive.  Currently lives with her daughter and 3 grandchildren.    Interval History:  Pt is here for telehealth appointment and to discuss results of recent PET CT scan obtained to assess for cancer recurrence.  She has her daughter on the phone who provides additional medical history. Patient reports she is recovering from a upper respiratory tract infection, which she thinks could possibly be the flu, reports  continued shortness of air with exertion, reports stable lymphadenopathy in her cervical region of her neck.  She continues to report fatigue and difficulty sleeping and anxiety.  She has an upcoming appointment with a psychiatrist on 10/22/2022. Pt continues to follow up with pain medication doctor in [place], [place].  She reports chronic ulcers in her lower extremities secondary to poorly controlled diabetes/chronic venous stasis which are stable.      Review of Systems   Constitutional: Positive for fatigue. Negative for chills and fever. Appetite change: decreased.   HENT: Positive for swollen glands.    Respiratory: Positive for shortness of breath. Negative for cough.    Gastrointestinal: Positive for abdominal pain.   Genitourinary: Pelvic pain: right side up into stomach.   Musculoskeletal: Positive for back pain.   Skin: Negative for rash.   Neurological: Positive for weakness.   Hematological: Positive for adenopathy (nodule on neck- pt is unawre of which side). Does not bruise/bleed easily.        Oncology/Hematology History    No history exists.       Objective     There were no vitals filed for this visit.            No physical exam performed as this was a telehealth/telephone clinic visit.  PHQ-9 Total Score:  Not applicable           Past Medical History     Past Medical History:   Diagnosis Date   • Anxiety     NO CURRENT MEDICATION   • Aortic stenosis, severe    • Arthritis    • Asthma    • Back pain    • Blood clotting disorder (HCC)    • Cancer associated pain    • Chronic low back pain with sciatica    • Diabetes mellitus (HCC)     TYPE 2   • Dyspnea on effort    • GERD (gastroesophageal reflux disease)    • H/O lumpectomy    • H/O: hysterectomy    • Hiatal hernia    • History of degenerative disc disease    • History of kidney stones    • History of pulmonary embolus (PE)    • History of transfusion    • Hodgkin's lymphoma (HCC)    • Immobility    • Lupus (HCC)    • Mitral valve prolapse   "  • Pelvic pain    • Peripheral neuropathy    • Personal history of DVT (deep vein thrombosis)    • Presence of intrathecal pump    • Sacroiliac joint disease    • SI (sacroiliac) joint inflammation (HCC)    • Venous insufficiency      Current Outpatient Medications on File Prior to Visit   Medication Sig Dispense Refill   • acetaminophen (TYLENOL) 500 MG tablet Take 1,000-1,500 mg by mouth Every 6 (Six) Hours As Needed for Mild Pain .     • B-D UF III MINI PEN NEEDLES 31G X 5 MM misc      • bacitracin 500 UNIT/GM ointment Apply 1 application topically to the appropriate area as directed 2 (Two) Times a Day. 28 g 5   • Benzethonium Chloride (Dermal Wound Cleanser) 0.13 % liquid Spray on wounds daily as needed (Patient taking differently: Spray on wounds daily as needed on both legs) 236 mL 1   • clopidogrel (Plavix) 75 MG tablet Take 1 tablet by mouth Daily. (Patient taking differently: Take 75 mg by mouth Daily. Last dose Thursday 9/22/22) 90 tablet 0   • Continuous Glucose Monitor Sup kit 1 kit Continuous. One continuous glucose meter, and supplies needed. Give one month worth of supplies, with 3 refills. ICD-10: e11.9 1 kit 3   • Elastic Bandages & Supports (ACE Bandage Self-Adhering) misc 1 each Daily. 60 each 0   • Elastic Bandages & Supports (ACE Elastic Bandage 4\") misc 1 Device Daily. One on each leg, bilateral.   #24 = one month supply. 24 each 3   • EPINEPHrine (EPIPEN) 0.3 MG/0.3ML solution auto-injector injection TAKE AS DIRECTED INCASE OF ALLERGIC REACTION.     • erythromycin (ROMYCIN) 5 MG/GM ophthalmic ointment Administer  to both eyes 2 (Two) Times a Day. (Patient taking differently: Administer  to both eyes 2 (Two) Times a Day. Not using) 1 g 0   • Flovent  MCG/ACT inhaler inhale 2 puffs by mouth twice daily (Patient taking differently: Inhale 1 puff 2 (Two) Times a Day.) 12 g 2   • FREESTYLE LITE test strip by Other route 4 (Four) Times a Day. Use to test blood sugar 4 times daily     • " furosemide (LASIX) 40 MG tablet Take 1 tablet by mouth Daily. 90 tablet 1   • gabapentin (NEURONTIN) 300 MG capsule TAKE ONE CAPSULE BY MOUTH THREE TIMES DAILY (Patient taking differently: Take 300 mg by mouth 4 (Four) Times a Day.) 270 capsule 1   • HYDROmorphone (DILAUDID) 1 mg/mL solution Infuse  into a venous catheter Dose Adjusted By Provider As Needed. PER PAIN PUMP     • Insulin Lispro (ADMELOG SOLOSTAR) 100 UNIT/ML injection pen SLIDING SCALE R/T BS. Takes 2-12 units tid (Patient taking differently: SLIDING SCALE R/T BS. Takes 2-12 units tid not taking for over 2 weeks) 2 pen 0   • ipratropium-albuterol (DUO-NEB) 0.5-2.5 mg/3 ml nebulizer Inhale 1 ampule.     • Lancets (freestyle) lancets Use as directed. Check sugars tid E11.9 100 each 0   • Lantus SoloStar 100 UNIT/ML injection pen Inject 20 Units under the skin into the appropriate area as directed. Pt does not have this med     • lidocaine (Lidoderm) 5 % Place 1 patch on the skin as directed by provider Daily. Remove & Discard patch within 12 hours or as directed by MD (Patient taking differently: Place 1 patch on the skin as directed by provider Daily. Remove & Discard patch within 12 hours or as directed by MD not using) 30 each 0   • ondansetron ODT (ZOFRAN-ODT) 4 MG disintegrating tablet Place 1 tablet on the tongue 4 (Four) Times a Day As Needed for Nausea or Vomiting. 30 tablet 0   • PEG-KCl-NaCl-NaSulf-Na Asc-C (Plenvu) 140 g reconstituted solution solution Take 140 g by mouth Take As Directed. Attn: Pharmacist: please see notes for coupon code. 1 each 0   • ProAir  (90 Base) MCG/ACT inhaler Inhale 2 puffs Every 4 (Four) Hours As Needed for Wheezing. (Patient taking differently: Pt does not know which inhaler she uses only uses one) 8.5 g 2   • Probiotic, Lactobacillus, capsule Take 1 each by mouth 2 (Two) Times a Day. 30 capsule 0   • saccharomyces boulardii (Florastor) 250 MG capsule Take 1 capsule by mouth 2 (Two) Times a Day. 60 capsule  5   • Sod Picosulfate-Mag Ox-Cit Acd (Clenpiq) 10-3.5-12 MG-GM -GM/160ML solution Take 160 mL by mouth Take As Directed. 320 mL 0   • sodium chloride 0.9 % solution      • triamcinolone (KENALOG) 0.1 % ointment Apply  topically to the appropriate area as directed 2 (Two) Times a Day. 30 g 0     Current Facility-Administered Medications on File Prior to Visit   Medication Dose Route Frequency Provider Last Rate Last Admin   • sterile water irrigation solution 500 mL  500 mL Irrigation Once Damari Cornejo, PAKwesiC          Allergies   Allergen Reactions   • Amoxicillin Shortness Of Breath   • Ceclor [Cefaclor] Shortness Of Breath   • Penicillins Shortness Of Breath   • Sulfa Antibiotics Rash   • Valium [Diazepam] Mental Status Change   • Ambien [Zolpidem] Unknown - Low Severity   • Aspirin GI Intolerance   • Ativan [Lorazepam] Unknown - Low Severity   • Benadryl [Diphenhydramine] Unknown - Low Severity   • Biaxin [Clarithromycin] Unknown - Low Severity   • Cefazolin Unknown - Low Severity   • Cephalexin Unknown - Low Severity   • Cephalosporins Unknown - Low Severity   • Clindamycin Unknown - Low Severity   • Compazine [Prochlorperazine Edisylate] Rash   • Contrast Dye Other (See Comments)     Caused pain in her arm   • Doxycycline Rash   • Eggs Or Egg-Derived Products Unknown - Low Severity   • Nsaids Unknown - Low Severity   • Phenergan [Promethazine Hcl] Rash   • Promethazine Unknown - Low Severity   • Vancomycin Itching     Past Surgical History:   Procedure Laterality Date   • BACK SURGERY      SPINAL FUSION    • BLADDER REPAIR     • CARDIAC CATHETERIZATION N/A 3/6/2019    Procedure: Valvuloplasty;  Surgeon: Wayne Johnson MD;  Location: [facility] CATH INVASIVE LOCATION;  Service: Cardiology   • CARDIAC VALVE REPLACEMENT  2021   • CHOLECYSTECTOMY     • COLONOSCOPY N/A 12/29/2021    Procedure: COLONOSCOPY;  Surgeon: Karine Orlando MD;  Location: [facility] ENDOSCOPY;  Service: Gastroenterology;   Laterality: N/A;  POOR PREP   • COLONOSCOPY N/A 4/13/2022    Procedure: COLONOSCOPY WITH POLYPECTOMY;  Surgeon: Karine Orlando MD;  Location: [facility] ENDOSCOPY;  Service: Gastroenterology;  Laterality: N/A;  COLON POLYP, HEMORRHOIDS, POOR PREP   • ENDOSCOPY N/A 12/29/2021    Procedure: ESOPHAGOGASTRODUODENOSCOPY;  Surgeon: Karine Orlando MD;  Location: [facility] ENDOSCOPY;  Service: Gastroenterology;  Laterality: N/A;  ESOPHAGITIS, GASTRITIS, HIATAL HERNIA   • HYSTERECTOMY     • LYMPHADENECTOMY     • PACEMAKER IMPLANTATION     • PAIN PUMP INSERTION/REVISION N/A 10/18/2019    Procedure: PAIN PUMP INSERTION [facility] 10-18-19 [place];  Surgeon: Horace Rios MD;  Location: [facility];  Service: Pain Management   • PAIN PUMP INSERTION/REVISION N/A 11/23/2020    Procedure: pain pump removal;  Surgeon: Horace Rios MD;  Location: [facility];  Service: Pain Management;  Laterality: N/A;   • TONSILLECTOMY     • TUBAL ABDOMINAL LIGATION     • TUMOR REMOVAL       Social History     Socioeconomic History   • Marital status:    Tobacco Use   • Smoking status: Current Every Day Smoker     Packs/day: 2.00     Years: 41.00     Pack years: 82.00     Types: Cigarettes   • Smokeless tobacco: Never Used   Vaping Use   • Vaping Use: Never used   Substance and Sexual Activity   • Alcohol use: No   • Drug use: No   • Sexual activity: Defer     Family History   Problem Relation Age of Onset   • Pancreatic cancer Sister    • Pancreatic cancer Paternal Grandmother    • Malig Hyperthermia Neg Hx        Results     Result Review   The following data was reviewed by: Jeaneth Haywood MD on 09/13/2022:  Lab Results   Component Value Date    HGB 13.1 09/13/2022    HCT 41.5 09/13/2022    MCV 83.5 09/13/2022     09/13/2022    WBC 4.39 09/13/2022    NEUTROABS 2.69 09/13/2022    LYMPHSABS 1.16 09/13/2022    MONOSABS 0.41 09/13/2022    EOSABS 0.11 09/13/2022    BASOSABS 0.01 09/13/2022     Lab Results    Component Value Date    GLUCOSE 147 (H) 09/13/2022    BUN 11 09/13/2022    CREATININE 0.55 (L) 09/13/2022     09/13/2022    K 4.1 09/13/2022    CL 99 09/13/2022    CO2 30.3 (H) 09/13/2022    CALCIUM 9.3 09/13/2022    PROTEINTOT 8.3 09/13/2022    ALBUMIN 4.10 09/13/2022    BILITOT 0.2 09/13/2022    ALKPHOS 105 09/13/2022    AST 12 09/13/2022    ALT 9 09/13/2022     Lab Results   Component Value Date    MG 1.9 09/05/2022    PHOS 3.9 06/07/2021    TSH 2.240 01/13/2022     ESR from 9/13/2022 was 36     [unfilled]  NM PET/CT Skull Base to Mid Thigh    Result Date: 10/4/2022    1. No evidence of lymphadenopathy on this examination. 2. Small bilateral consolidations as described above with no significant FDG avidity, likely evolving infectious or inflammatory processes.  No concerning pulmonary nodule identified.     MORALES TESFAYE MD       Electronically Signed and Approved By: MORALES TESFAYE MD on 10/04/2022 at 11:10             XR Chest 1 View    Result Date: 9/5/2022  Impression:  No acute cardiopulmonary abnormality is identified.       GUDELIA NUÑEZ MD       Electronically Signed and Approved By: GUDELIA NUÑEZ MD on 9/05/2022 at 22:01             NM PET/CT Skull Base to Mid Thigh    Result Date: 10/4/2022  Impression:   1. No evidence of lymphadenopathy on this examination. 2. Small bilateral consolidations as described above with no significant FDG avidity, likely evolving infectious or inflammatory processes.  No concerning pulmonary nodule identified.     MORALES TESFAYE MD       Electronically Signed and Approved By: MORALES TESFAYE MD on 10/04/2022 at 11:10               Assessment & Plan     Diagnoses and all orders for this visit:    1. Pulmonary emphysema, unspecified emphysema type (HCC) (Primary)  -     Ambulatory Referral to Pulmonology    2. History of Hodgkin's lymphoma        Isha Llanes is a 56 y.o. female who presents to [facility] HEMATOLOGY & ONCOLOGY for  follow up regarding Hodgkin's Lymphoma is currently under active surveillance.  Please see above for treatment history.   She has been following with Dr. Mancuso local primary oncologist here in [place], [place].  Most recent clinic notes from his office report patient went a PET/CT in approximately November 2021 which was negative for any recurrence of Hodgkin's lymphoma.      Telehealth clinic appointment today to discuss PET CT scan obtained in 10/4/2022 and recent labs collected at last appointment.  I discussed with patient and daughter the PET CT scan which did not reveal any evidence of lymphoma recurrence. Also, since pt's PET CT scan was negative, shared that the ESR level elevation was likely reactive from her chronic ulcers, her upper respiratory tract infection, and less likely from lymphoma.  Patient and daughter were concerned about the 2 small consolidations in her bilateral upper lobe lung lobes, which I explained were likely infectious in etiology.  Given patient and daughter's continued concern I placed a referral for her to be evaluated in pulmonology clinic for the consolidations and for emphysema management.     Discussed my plan to have patient follow-up in 5-6 months for surveillance of her Hodgkin lymphoma.    Recommend patient continue to follow-up with her pain management physician in [facility] along with attend upcoming appointment with psychiatrist.    Please note that portions of this note were completed with a voice recognition program.    Electronically signed by Jeaneth Haywood MD, 10/05/22, 1:25 PM EDT.      Follow Up     I spent 30 minutes caring for Isha on this date of service. This time includes time spent by me in the following activities: preparing for the visit, reviewing tests, obtaining and/or reviewing a separately obtained history, documenting information in the medical record and care coordination.    This is an acute or chronic illness that poses a  threat to life or bodily function. The above treatment plan involves a high risk of complications and/or mortality of patient management.    The patient was seen and examined. Work by the provider also included review and/or ordering of lab tests, review and/or ordering of radiology tests, review and/or ordering of medicine tests, discussion with other physicians or providers, independent review of data, obtaining old records, review/summation of old records, and/or other review. Patient was seen during the coronavirus pandemic. Additional time and precaution was required to complete their clinic visit and/or treatment visit due to these issues.    I have reviewed the family history, social history, and past medical history for this patient. Previous information and data has been reviewed and updated as needed. I have reviewed and verified the chief complaint, history, and other documentation. The patient was interviewed and examined at the bedside and the chart reviewed. The previous observations, recommendations, and conclusions were reviewed including those of other providers.     The plan was discussed with the patient and/or family. The patient was given time to ask questions and these questions were answered. At the conclusion of their visit they had no additional questions or concerns and all questions were answered to their satisfaction.    Follow Up   No follow-ups on file.    Patient was given instructions and counseling regarding her condition or for health maintenance advice. Please see specific information pulled into the AVS if appropriate.

## 2022-10-06 ENCOUNTER — TELEPHONE (OUTPATIENT)
Dept: ONCOLOGY | Facility: HOSPITAL | Age: 57
End: 2022-10-06

## 2022-10-07 NOTE — TELEPHONE ENCOUNTER
Diagnosis: history of cancer in remission    Past cancer treatment: Patient reported she had a few stressful days due to having scans to see if her cancer had returned.    Mental Health treatment: Prior history of treatment by psychiatrist and therapist. Patient stated she is currently not taking any psych meds. She noted xanax was helpful for her anxiety but she understands that doctors are not able to prescribe that medication on a regular basis due to addiction concerns. She reported her depression is a byproduct of her physical health concerns thus medication for depression does not help control her depression. Patient reported she has not had suicidal ideations. She denied any current thoughts of death or dying. She was excited that her scan results showed no cancer. OSW provided empathy, support, and active listening.     Distress Score: 5 indicated for pain, sleep, fatigue, tobacco use, loss of interest or enjoyment, taking care of self, changes in eating, worry or anxiety, housing, insurance, and having enough food    PHQ-9 Score: 1    Content of Call: OSW contacted Patient to review her distress score and the concerns she indicated causing her distress. Patient reported her food stamps were cut to $28 per month and she reported she knew about the food alicea in her area. Patient reported she has lived with her daughter for the past 4 years and would like her own place but there is nothing available that is affordable for her. Patient had two daughters. One daughter was killed in a car accident five years ago. Patient has a past history of psychiatric treatment and has a therapist and psychiatrist she can schedule with when she feels she needs to speak to someone.     Support System: Patient reported her support system includes her family and friends.    Financial concerns: Patient reported she has no concerns regarding being able to pay for her medical needs. She reported her electric wheelchair has a part  that needs replaced and someone is in the process of repairing. She reported she is using her rollator which is not easy for her to use.     Hobbies: Patient reported she has no hobbies due to the multiple doctor visits.     Home Care Needs: None were identified on this call.

## 2022-10-11 ENCOUNTER — TELEPHONE (OUTPATIENT)
Dept: GASTROENTEROLOGY | Facility: CLINIC | Age: 57
End: 2022-10-11

## 2022-10-11 NOTE — TELEPHONE ENCOUNTER
Pt called and is wanting her scope to be rescheduled from 9/30. Pt stated that the prep made her sick so she needs to do something else for the clean out.

## 2022-10-18 ENCOUNTER — TELEPHONE (OUTPATIENT)
Dept: FAMILY MEDICINE CLINIC | Facility: CLINIC | Age: 57
End: 2022-10-18

## 2022-10-18 DIAGNOSIS — M54.41 CHRONIC LOW BACK PAIN WITH BILATERAL SCIATICA, UNSPECIFIED BACK PAIN LATERALITY: ICD-10-CM

## 2022-10-18 DIAGNOSIS — L97.219 VENOUS STASIS ULCER OF RIGHT CALF, UNSPECIFIED ULCER STAGE, UNSPECIFIED WHETHER VARICOSE VEINS PRESENT: Primary | ICD-10-CM

## 2022-10-18 DIAGNOSIS — L97.229 VENOUS STASIS ULCER OF LEFT CALF, UNSPECIFIED ULCER STAGE, UNSPECIFIED WHETHER VARICOSE VEINS PRESENT: ICD-10-CM

## 2022-10-18 DIAGNOSIS — F41.1 GENERALIZED ANXIETY DISORDER: ICD-10-CM

## 2022-10-18 DIAGNOSIS — Z74.09 LIMITED MOBILITY: ICD-10-CM

## 2022-10-18 DIAGNOSIS — M54.42 CHRONIC LOW BACK PAIN WITH BILATERAL SCIATICA, UNSPECIFIED BACK PAIN LATERALITY: ICD-10-CM

## 2022-10-18 DIAGNOSIS — I83.022 VENOUS STASIS ULCER OF LEFT CALF, UNSPECIFIED ULCER STAGE, UNSPECIFIED WHETHER VARICOSE VEINS PRESENT: ICD-10-CM

## 2022-10-18 DIAGNOSIS — G89.29 CHRONIC LOW BACK PAIN WITH BILATERAL SCIATICA, UNSPECIFIED BACK PAIN LATERALITY: ICD-10-CM

## 2022-10-18 DIAGNOSIS — I83.012 VENOUS STASIS ULCER OF RIGHT CALF, UNSPECIFIED ULCER STAGE, UNSPECIFIED WHETHER VARICOSE VEINS PRESENT: Primary | ICD-10-CM

## 2022-10-18 DIAGNOSIS — G89.4 CHRONIC PAIN SYNDROME: ICD-10-CM

## 2022-10-18 NOTE — TELEPHONE ENCOUNTER
"Referral updates given to pt    sw referred as well    Pt has electric w/c via [employer]. Parts on back order. Advised pt that she can pay out of pocket if it's been <5 years since last w/c bought. She asked if she can \"go through [employer]\" to get a new w/c. The parts she needs for current one are discontinued.     Asked for PT with a pool, order pended.  "

## 2022-10-18 NOTE — TELEPHONE ENCOUNTER
Recommend that patient be scheduled for OV to discuss constipation and inability to complete bowel prep for colonoscopy.  How often is she having a bowel movement?  Is she taking anything for constipation?

## 2022-10-18 NOTE — TELEPHONE ENCOUNTER
Caller: Isha Llanes    Relationship: Self    Best call back number: [phone]    What is the best time to reach you: ANYTIME     Who are you requesting to speak with (clinical staff, provider,  specific staff member): CLINICAL/ GEORGE         What was the call regarding: PATIENT CALLING TO SPEAK WITH GEORGE, ABOUT HOUSING PLACEMENT PROGRAM, AND REFERRALS TO SPECIALITY PROVIDER.     Do you require a callback: YES

## 2022-10-19 ENCOUNTER — TELEPHONE (OUTPATIENT)
Dept: FAMILY MEDICINE CLINIC | Facility: CLINIC | Age: 57
End: 2022-10-19

## 2022-10-19 RX ORDER — ONDANSETRON 4 MG/1
4 TABLET, ORALLY DISINTEGRATING ORAL 4 TIMES DAILY PRN
Qty: 30 TABLET | Refills: 0 | Status: SHIPPED | OUTPATIENT
Start: 2022-10-19 | End: 2022-11-09 | Stop reason: SDUPTHER

## 2022-10-19 NOTE — TELEPHONE ENCOUNTER
Caller: Isha Llanes    Relationship: Self    Best call back number: [phone]    Requested Prescriptions:   Requested Prescriptions     Pending Prescriptions Disp Refills   • ondansetron ODT (ZOFRAN-ODT) 4 MG disintegrating tablet 30 tablet 0     Sig: Place 1 tablet on the tongue 4 (Four) Times a Day As Needed for Nausea or Vomiting.        Pharmacy where request should be sent: [facility] - [place], [place] - [address] [facility] [phone] [facility] [phone] FX       Does the patient have less than a 3 day supply:  [x] Yes  [] No    Maximilian Rivrea Rep   10/19/22 10:21 EDT

## 2022-10-20 ENCOUNTER — PATIENT OUTREACH (OUTPATIENT)
Dept: CASE MANAGEMENT | Facility: OTHER | Age: 57
End: 2022-10-20

## 2022-10-21 ENCOUNTER — OFFICE VISIT (OUTPATIENT)
Dept: BEHAVIORAL HEALTH | Facility: CLINIC | Age: 57
End: 2022-10-21

## 2022-10-21 VITALS
BODY MASS INDEX: 25.88 KG/M2 | HEIGHT: 66 IN | WEIGHT: 161 LBS | DIASTOLIC BLOOD PRESSURE: 70 MMHG | SYSTOLIC BLOOD PRESSURE: 110 MMHG

## 2022-10-21 DIAGNOSIS — F33.2 SEVERE EPISODE OF RECURRENT MAJOR DEPRESSIVE DISORDER, WITHOUT PSYCHOTIC FEATURES: ICD-10-CM

## 2022-10-21 DIAGNOSIS — F17.210 CIGARETTE NICOTINE DEPENDENCE, UNCOMPLICATED: ICD-10-CM

## 2022-10-21 DIAGNOSIS — F43.10 POST TRAUMATIC STRESS DISORDER (PTSD): ICD-10-CM

## 2022-10-21 DIAGNOSIS — G47.00 INSOMNIA, UNSPECIFIED TYPE: ICD-10-CM

## 2022-10-21 DIAGNOSIS — F41.0 PANIC DISORDER: ICD-10-CM

## 2022-10-21 DIAGNOSIS — F41.1 GENERALIZED ANXIETY DISORDER: Primary | ICD-10-CM

## 2022-10-21 LAB — GLUCOSE BLDC GLUCOMTR-MCNC: 158 MG/DL (ref 70–130)

## 2022-10-21 PROCEDURE — 90792 PSYCH DIAG EVAL W/MED SRVCS: CPT | Performed by: PHYSICIAN ASSISTANT

## 2022-10-21 RX ORDER — FAMOTIDINE 20 MG/1
20 TABLET, FILM COATED ORAL NIGHTLY PRN
COMMUNITY
Start: 2022-09-29 | End: 2022-11-26

## 2022-10-21 RX ORDER — PANTOPRAZOLE SODIUM 40 MG/1
40 TABLET, DELAYED RELEASE ORAL DAILY
COMMUNITY
Start: 2022-09-30 | End: 2022-10-21

## 2022-10-21 RX ORDER — MAGNESIUM HYDROXIDE 1200 MG/15ML
LIQUID ORAL
COMMUNITY
Start: 2022-10-10 | End: 2022-10-21

## 2022-10-21 RX ORDER — PROCHLORPERAZINE 25 MG/1
SUPPOSITORY RECTAL SEE ADMIN INSTRUCTIONS
COMMUNITY
Start: 2022-09-30

## 2022-10-21 NOTE — PROGRESS NOTES
"    Chief Complaint:  Depression, anxiety     History of Present Illness: Isha Llanes is a 56 y.o. female who presents to the office today referred by PCP Vidal ESCOBAR.  Patient complains of depression that is constant, which she attributes to her physical limitations.  She also complains of difficulty falling and staying asleep.  Patient states she will sleep for about 2 to 3 hours a night.  She also complains of anhedonia.  Patient denies having any SI or HI.  No access to firearms.  Patient denies history of suicide attempt or self-harm.  No symptoms of la or hypomania.  Patient complains of anxiety that \"comes and goes\" and occurs daily.  Her anxiety consists of worrying about everything.  She also complains of feeling restless and always on edge.  No irritability.  History of panic attacks which consist of feeling sweaty and palpitations that will occur daily.  Her anxiety is not worse in social interactions.  Patient reports having recurring intrusive thoughts when experiencing a trigger of past trauma, such as someone yelling at her.  Patient states she does not think about past trauma otherwise if there are no triggers.  Patient denies AVH.      Medical Record Review: Reviewed office visit note from 9/16/22, Her anxiety is really high.  She has a consultation with psychiatry next week.      PHQ-9 Depression Screening  Little interest or pleasure in doing things? 2-->more than half the days   Feeling down, depressed, or hopeless? 1-->several days   Trouble falling or staying asleep, or sleeping too much? 3-->nearly every day   Feeling tired or having little energy? 2-->more than half the days   Poor appetite or overeating? 1-->several days   Feeling bad about yourself - or that you are a failure or have let yourself or your family down? 1-->several days   Trouble concentrating on things, such as reading the newspaper or watching television? 1-->several days   Moving or speaking so slowly " that other people could have noticed? Or the opposite - being so fidgety or restless that you have been moving around a lot more than usual? 1-->several days   Thoughts that you would be better off dead, or of hurting yourself in some way? 0-->not at all   PHQ-9 Total Score 12   If you checked off any problems, how difficult have these problems made it for you to do your work, take care of things at home, or get along with other people? somewhat difficult         JEFFREY-7  Feeling nervous, anxious or on edge: Nearly every day  Not being able to stop or control worrying: More than half the days  Worrying too much about different things: Several days  Trouble Relaxing: Nearly every day  Being so restless that it is hard to sit still: Several days  Feeling afraid as if something awful might happen: Several days  Becoming easily annoyed or irritable: Several days  JEFFREY 7 Total Score: 12  If you checked any problems, how difficult have these problems made it for you to do your work, take care of things at home, or get along with other people: Very difficult      ROS:  Review of Systems   Constitutional: Positive for appetite change, diaphoresis, fatigue and unexpected weight change.   HENT: Positive for trouble swallowing. Negative for drooling and tinnitus.    Eyes: Negative for visual disturbance.   Respiratory: Negative for cough, chest tightness and shortness of breath.    Cardiovascular: Negative for chest pain and palpitations.   Gastrointestinal: Positive for abdominal pain, constipation, diarrhea and nausea. Negative for vomiting.   Endocrine: Positive for cold intolerance. Negative for heat intolerance.   Genitourinary: Negative for difficulty urinating.   Musculoskeletal: Positive for arthralgias and myalgias.   Skin: Negative for rash.   Allergic/Immunologic: Negative for immunocompromised state.   Neurological: Positive for dizziness. Negative for tremors, seizures and headaches.   Psychiatric/Behavioral:  Positive for agitation, dysphoric mood and sleep disturbance. Negative for hallucinations, self-injury and suicidal ideas. The patient is nervous/anxious.        Problem List:  Patient Active Problem List   Diagnosis   • Cancer associated pain   • Presence of intrathecal pump   • Chronic low back pain with bilateral sciatica   • Sacroiliac inflammation ([facility])   • Chronic pain syndrome   • Pelvic pain   • Aortic stenosis, severe   • Dyspnea on effort   • Other pulmonary embolism without acute cor pulmonale ([facility])   • Nonrheumatic aortic valve stenosis   • Hip pain   • Anxiety disorder   • Allergic rhinitis due to allergen   • Arthritis   • Asthma   • Kidney disease   • CHF (congestive heart failure) ([facility])   • Chronic obstructive pulmonary disease ([facility])   • Diabetes mellitus, type 2 ([facility])   • Depression   • GERD (gastroesophageal reflux disease)   • S/P TAVR (transcatheter aortic valve replacement)   • Limited mobility   • Venous hypertension of both lower extremities   • Nicotine dependence   • Mitral valve prolapse   • Lupus (systemic lupus erythematosus) ([facility])   • Long term current use of anticoagulant therapy   • History of Hodgkin's lymphoma   • Hepatic steatosis   • Heart murmur   • CAD (coronary artery disease)   • Non-healing wound of lower extremity, subsequent encounter   • MSSA (methicillin susceptible Staphylococcus aureus) infection   • Sepsis ([facility])   • Change in bowel habits   • Nausea   • Venous stasis ulcer of right calf ([facility])   • Venous stasis ulcer of left calf ([facility])   • Noncompliance   • Peripheral vascular disease ([facility])   • Obesity with body mass index 30 or greater   • Neurologic disorder   • Neck pain   • Limb pain   • Hiatal hernia   • Bladder disorder   • Frailty   • Cellulitis of right foot   • Bowel disease   • Atrophy of skin   • Breast lump       Current Medications:   Current Outpatient Medications   Medication Sig Dispense Refill   • acetaminophen (TYLENOL) 500 MG tablet Take 1,000-1,500 mg  "by mouth Every 6 (Six) Hours As Needed for Mild Pain .     • bacitracin 500 UNIT/GM ointment Apply 1 application topically to the appropriate area as directed 2 (Two) Times a Day. 28 g 5   • Benzethonium Chloride (Dermal Wound Cleanser) 0.13 % liquid Spray on wounds daily as needed (Patient taking differently: Spray on wounds daily as needed on both legs) 236 mL 1   • clopidogrel (Plavix) 75 MG tablet Take 1 tablet by mouth Daily. (Patient taking differently: Take 1 tablet by mouth Daily. Last dose Thursday 9/22/22) 90 tablet 0   • Elastic Bandages & Supports (ACE Bandage Self-Adhering) misc 1 each Daily. 60 each 0   • Elastic Bandages & Supports (ACE Elastic Bandage 4\") misc 1 Device Daily. One on each leg, bilateral.   #24 = one month supply. 24 each 3   • EPINEPHrine (EPIPEN) 0.3 MG/0.3ML solution auto-injector injection TAKE AS DIRECTED INCASE OF ALLERGIC REACTION.     • Flovent  MCG/ACT inhaler inhale 2 puffs by mouth twice daily (Patient taking differently: Inhale 1 puff 2 (Two) Times a Day.) 12 g 2   • furosemide (LASIX) 40 MG tablet Take 1 tablet by mouth Daily. 90 tablet 1   • gabapentin (NEURONTIN) 300 MG capsule TAKE ONE CAPSULE BY MOUTH THREE TIMES DAILY (Patient taking differently: Take 1 capsule by mouth 4 (Four) Times a Day.) 270 capsule 1   • HYDROmorphone (DILAUDID) 1 mg/mL solution Infuse  into a venous catheter Dose Adjusted By Provider As Needed. PER PAIN PUMP     • lidocaine (Lidoderm) 5 % Place 1 patch on the skin as directed by provider Daily. Remove & Discard patch within 12 hours or as directed by MD (Patient taking differently: Place 1 patch on the skin as directed by provider Daily. Remove & Discard patch within 12 hours or as directed by MD not using) 30 each 0   • ondansetron ODT (ZOFRAN-ODT) 4 MG disintegrating tablet Place 1 tablet on the tongue 4 (Four) Times a Day As Needed for Nausea or Vomiting. 30 tablet 0   • sodium chloride 0.9 % solution      • triamcinolone (KENALOG) " 0.1 % ointment Apply  topically to the appropriate area as directed 2 (Two) Times a Day. 30 g 0   • B-D UF III MINI PEN NEEDLES 31G X 5 MM misc      • Cariprazine HCl (Vraylar) 1.5 MG capsule capsule Take 1 capsule by mouth Daily for 21 days. 21 capsule 0   • Continuous Blood Gluc Sensor (Dexcom G6 Sensor) See Admin Instructions.     • Continuous Glucose Monitor Sup kit 1 kit Continuous. One continuous glucose meter, and supplies needed. Give one month worth of supplies, with 3 refills. ICD-10: e11.9 1 kit 3   • Elastic Bandages & Supports (Medical Compression Socks) misc 1 Device Daily. bilateral compression socks 2 each 1   • famotidine (PEPCID) 20 MG tablet Take 1 tablet by mouth At Night As Needed.     • FREESTYLE LITE test strip by Other route 4 (Four) Times a Day. Use to test blood sugar 4 times daily     • Insulin Lispro (ADMELOG SOLOSTAR) 100 UNIT/ML injection pen SLIDING SCALE R/T BS. Takes 2-12 units tid (Patient taking differently: SLIDING SCALE R/T BS. Takes 2-12 units tid not taking for over 2 weeks) 2 pen 0   • ipratropium-albuterol (DUO-NEB) 0.5-2.5 mg/3 ml nebulizer Inhale 1 ampule.     • Lancets (freestyle) lancets Use as directed. Check sugars tid E11.9 100 each 0   • Lantus SoloStar 100 UNIT/ML injection pen Inject 20 Units under the skin into the appropriate area as directed. Pt does not have this med     • ProAir  (90 Base) MCG/ACT inhaler Inhale 2 puffs Every 4 (Four) Hours As Needed for Wheezing. (Patient taking differently: Pt does not know which inhaler she uses only uses one) 8.5 g 2     Current Facility-Administered Medications   Medication Dose Route Frequency Provider Last Rate Last Admin   • sterile water irrigation solution 500 mL  500 mL Irrigation Once Damari Cornejo PA-C           Discontinued Medications:  Medications Discontinued During This Encounter   Medication Reason   • erythromycin (ROMYCIN) 5 MG/GM ophthalmic ointment *Therapy completed   • pantoprazole (PROTONIX)  40 MG EC tablet *Therapy completed   • Probiotic, Lactobacillus, capsule *Therapy completed   • saccharomyces boulardii (Florastor) 250 MG capsule *Therapy completed   • sodium chloride (NS) 0.9 % irrigation *Re-Entry       Allergy:   Allergies   Allergen Reactions   • Amoxicillin Shortness Of Breath   • Ceclor [Cefaclor] Shortness Of Breath   • Penicillins Shortness Of Breath   • Sulfa Antibiotics Rash   • Valium [Diazepam] Mental Status Change   • Ambien [Zolpidem] Unknown - Low Severity   • Aspirin GI Intolerance   • Ativan [Lorazepam] Unknown - Low Severity   • Benadryl [Diphenhydramine] Unknown - Low Severity   • Biaxin [Clarithromycin] Unknown - Low Severity   • Cefazolin Unknown - Low Severity   • Cephalexin Unknown - Low Severity   • Cephalosporins Unknown - Low Severity   • Clindamycin Unknown - Low Severity   • Compazine [Prochlorperazine Edisylate] Rash   • Contrast Dye Other (See Comments)     Caused pain in her arm   • Doxycycline Rash   • Eggs Or Egg-Derived Products Unknown - Low Severity   • Nsaids Unknown - Low Severity   • Phenergan [Promethazine Hcl] Rash   • Promethazine Unknown - Low Severity   • Vancomycin Itching        Past Medical History:  Past Medical History:   Diagnosis Date   • Anxiety     NO CURRENT MEDICATION   • Aortic stenosis, severe    • Arthritis    • Asthma    • Back pain    • Blood clotting disorder (HCC)    • Cancer associated pain    • Chronic low back pain with sciatica    • Chronic pain disorder    • Diabetes mellitus (HCC)     TYPE 2   • Dyspnea on effort    • GERD (gastroesophageal reflux disease)    • H/O lumpectomy    • H/O: hysterectomy    • Hiatal hernia    • History of degenerative disc disease    • History of kidney stones    • History of pulmonary embolus (PE)    • History of transfusion    • Hodgkin's lymphoma (HCC)    • Immobility    • Liver disease    • Lupus (HCC)    • Mitral valve prolapse    • Panic disorder    • Pelvic pain    • Peripheral neuropathy    •  "Personal history of DVT (deep vein thrombosis)    • Presence of intrathecal pump    • PTSD (post-traumatic stress disorder)    • Sacroiliac joint disease    • SI (sacroiliac) joint inflammation (HCC)    • Venous insufficiency        Past Surgical History:  Past Surgical History:   Procedure Laterality Date   • BACK SURGERY      SPINAL FUSION    • BLADDER REPAIR     • CARDIAC CATHETERIZATION N/A 3/6/2019    Procedure: Valvuloplasty;  Surgeon: Wayne Johnson MD;  Location: [facility] INVASIVE LOCATION;  Service: Cardiology   • CARDIAC VALVE REPLACEMENT  2021   • CHOLECYSTECTOMY     • COLONOSCOPY N/A 12/29/2021    Procedure: COLONOSCOPY;  Surgeon: Karine Orlando MD;  Location: [facility] ENDOSCOPY;  Service: Gastroenterology;  Laterality: N/A;  POOR PREP   • COLONOSCOPY N/A 4/13/2022    Procedure: COLONOSCOPY WITH POLYPECTOMY;  Surgeon: Karine Orlando MD;  Location: [facility] ENDOSCOPY;  Service: Gastroenterology;  Laterality: N/A;  COLON POLYP, HEMORRHOIDS, POOR PREP   • ENDOSCOPY N/A 12/29/2021    Procedure: ESOPHAGOGASTRODUODENOSCOPY;  Surgeon: Karine Orlando MD;  Location: [facility] ENDOSCOPY;  Service: Gastroenterology;  Laterality: N/A;  ESOPHAGITIS, GASTRITIS, HIATAL HERNIA   • HYSTERECTOMY     • LYMPHADENECTOMY     • PACEMAKER IMPLANTATION     • PAIN PUMP INSERTION/REVISION N/A 10/18/2019    Procedure: PAIN PUMP INSERTION [facility] 10-18-19 [place];  Surgeon: Horace Rios MD;  Location: [facility];  Service: Pain Management   • PAIN PUMP INSERTION/REVISION N/A 11/23/2020    Procedure: pain pump removal;  Surgeon: Horace Rios MD;  Location: [facility];  Service: Pain Management;  Laterality: N/A;   • TONSILLECTOMY     • TUBAL ABDOMINAL LIGATION     • TUMOR REMOVAL         Past Psychiatric History:  Began Treatment: 22 years old  Diagnoses: Panic disorder, PTSD  Psychiatrist: \"A long time ago\"  Therapist: Patient states she briefly did therapy \"a long time " "ago\"  Admission History: Denies  Medications/Treatment: Patient states \"I have tried everything and the only thing that has helped is Xanax.\"  S/p amitriptyline, Zoloft, Lexapro, Paxil, Prozac, Cymbalta, Effexor, Remeron, Seroquel, trazodone, hydroxyzine.  Patient thinks she may have tried Ramelteon.   Self Harm: Denies  Suicide Attempts: Denies  Postpartum depression: Denies    Family Psychiatric History:   Diagnoses: Denies  Substance use: Denies  Suicide Attempts/Completions: Denies    Family History   Problem Relation Age of Onset   • Anxiety disorder Mother    • Depression Sister    • Bipolar disorder Sister    • Pancreatic cancer Sister    • ADD / ADHD Brother    • Pancreatic cancer Paternal Grandmother    • ADD / ADHD Granddaughter    • ADD / ADHD Grandson    • ADD / ADHD Nephew    • Malig Hyperthermia Neg Hx        Substance Abuse History:   Alcohol use: Denies  Nicotine: Patient smokes 2 to 3 packs/day.  Patient states \"I do not want to quit.\"  Illicit Drug Use: Denies  Longest Period Sober: Denies  Rehab/AA/NA: Denies    Social History:  Living Situation: Patient currently lives with her daughter, her daughter's , and her 3 grandchildren.  Marital/Relationship History:   Children: 1 living daughter (1  daughter)  Work History/Occupation: Denies  Education: Patient received her GED, some college   History: Denies  Legal: Denies    Social History     Socioeconomic History   • Marital status:    Tobacco Use   • Smoking status: Every Day     Packs/day: 2.00     Years: 41.00     Pack years: 82.00     Types: Cigarettes   • Smokeless tobacco: Never   Vaping Use   • Vaping Use: Never used   Substance and Sexual Activity   • Alcohol use: No   • Drug use: No   • Sexual activity: Not Currently       Developmental History:   Place of birth: Patient was born in [place].  Siblings: 4 brothers  Childhood: Patient reports childhood was unremarkable.  No history of abuse, trauma, or " "neglect.  Patient reports her mother  her father when she was 3 years old.      Physical Exam:  Physical Exam    Appearance: Pt appears older than stated age, pt is chronically ill appearing, pt is disheveled, maintains good eye contact.   Behavior: Appropriate, mostly cooperative. No acute distress.  Motor: No abnormal movements, tics or tremors are noted. No psychomotor agitation or retardation.  Speech: Coherent, spontaneous, appropriate with normal rate, volume, rhythm, and tone. Normal reaction time to questions. No hyperverbal or pressured speech.   Mood: \"I'm not good otherwise I wouldn't be here\"  Affect: Pt appears depressed. Pt slightly agitated when discussing she is not an appropriate candidate for xanax and continues to ask about this medication.   Thought content: Negative suicidal ideations, negative homicidal ideations. Patient denies any obsession, compulsion, or phobia. No evidence of delusions.  Perceptions: Negative auditory hallucinations, negative visual hallucinations. Pt does not appear to be actively responding to internal stimuli.   Thought process: Logical, goal-directed, coherent, and linear with no evidence of flight of ideas, looseness of associations, thought blocking, circumstantiality, or tangentiality.   Insight/Judgement: Fair/fair  Cognition: Alert and oriented to person, place, and date. Memory intact for recent and remote events. Attention and concentration intact.     Vital Signs:   /70   Ht 167.6 cm (66\") Comment: pt unable to stand  Wt 73 kg (161 lb)   BMI 25.99 kg/m²      Lab Results:   Office Visit on 10/21/2022   Component Date Value Ref Range Status   • Glucose 10/21/2022 158 (A)  70 - 130 mg/dL Final   Office Visit on 09/16/2022   Component Date Value Ref Range Status   • Hemoglobin A1C 09/16/2022 6.60 (H)  4.80 - 5.60 % Final   • Hepatitis C Ab 09/16/2022 Reactive (A)  Non-Reactive Final   Lab on 09/13/2022   Component Date Value Ref Range Status   • " Glucose 09/13/2022 147 (H)  65 - 99 mg/dL Final   • BUN 09/13/2022 11  6 - 20 mg/dL Final   • Creatinine 09/13/2022 0.55 (L)  0.57 - 1.00 mg/dL Final   • Sodium 09/13/2022 138  136 - 145 mmol/L Final   • Potassium 09/13/2022 4.1  3.5 - 5.2 mmol/L Final   • Chloride 09/13/2022 99  98 - 107 mmol/L Final   • CO2 09/13/2022 30.3 (H)  22.0 - 29.0 mmol/L Final   • Calcium 09/13/2022 9.3  8.6 - 10.5 mg/dL Final   • Total Protein 09/13/2022 8.3  6.0 - 8.5 g/dL Final   • Albumin 09/13/2022 4.10  3.50 - 5.20 g/dL Final   • ALT (SGPT) 09/13/2022 9  1 - 33 U/L Final   • AST (SGOT) 09/13/2022 12  1 - 32 U/L Final   • Alkaline Phosphatase 09/13/2022 105  39 - 117 U/L Final   • Total Bilirubin 09/13/2022 0.2  0.0 - 1.2 mg/dL Final   • Globulin 09/13/2022 4.2  gm/dL Final   • A/G Ratio 09/13/2022 1.0  g/dL Final   • BUN/Creatinine Ratio 09/13/2022 20.0  7.0 - 25.0 Final   • Anion Gap 09/13/2022 8.7  5.0 - 15.0 mmol/L Final   • eGFR 09/13/2022 107.7  >60.0 mL/min/1.73 Final    National Kidney Foundation and American Society of Nephrology (ASN) Task Force recommended calculation based on the Chronic Kidney Disease Epidemiology Collaboration (CKD-EPI) equation refit without adjustment for race.   • Sed Rate 09/13/2022 36 (H)  0 - 30 mm/hr Final   • WBC 09/13/2022 4.39  3.40 - 10.80 10*3/mm3 Final   • RBC 09/13/2022 4.97  3.77 - 5.28 10*6/mm3 Final   • Hemoglobin 09/13/2022 13.1  12.0 - 15.9 g/dL Final   • Hematocrit 09/13/2022 41.5  34.0 - 46.6 % Final   • MCV 09/13/2022 83.5  79.0 - 97.0 fL Final   • MCH 09/13/2022 26.4 (L)  26.6 - 33.0 pg Final   • MCHC 09/13/2022 31.6  31.5 - 35.7 g/dL Final   • RDW 09/13/2022 14.2  12.3 - 15.4 % Final   • RDW-SD 09/13/2022 43.5  37.0 - 54.0 fl Final   • MPV 09/13/2022 9.9  6.0 - 12.0 fL Final   • Platelets 09/13/2022 197  140 - 450 10*3/mm3 Final   • Neutrophil % 09/13/2022 61.4  42.7 - 76.0 % Final   • Lymphocyte % 09/13/2022 26.4  19.6 - 45.3 % Final   • Monocyte % 09/13/2022 9.3  5.0 - 12.0 %  Final   • Eosinophil % 09/13/2022 2.5  0.3 - 6.2 % Final   • Basophil % 09/13/2022 0.2  0.0 - 1.5 % Final   • Immature Grans % 09/13/2022 0.2  0.0 - 0.5 % Final   • Neutrophils, Absolute 09/13/2022 2.69  1.70 - 7.00 10*3/mm3 Final   • Lymphocytes, Absolute 09/13/2022 1.16  0.70 - 3.10 10*3/mm3 Final   • Monocytes, Absolute 09/13/2022 0.41  0.10 - 0.90 10*3/mm3 Final   • Eosinophils, Absolute 09/13/2022 0.11  0.00 - 0.40 10*3/mm3 Final   • Basophils, Absolute 09/13/2022 0.01  0.00 - 0.20 10*3/mm3 Final   • Immature Grans, Absolute 09/13/2022 0.01  0.00 - 0.05 10*3/mm3 Final   • nRBC 09/13/2022 0.0  0.0 - 0.2 /100 WBC Final   Admission on 09/05/2022, Discharged on 09/05/2022   Component Date Value Ref Range Status   • QT Interval 09/05/2022 391  ms Final   • QT Interval 09/05/2022 381  ms Corrected   • Glucose 09/05/2022 127 (H)  65 - 99 mg/dL Final   • BUN 09/05/2022 13  6 - 20 mg/dL Final   • Creatinine 09/05/2022 0.48 (L)  0.57 - 1.00 mg/dL Final   • Sodium 09/05/2022 137  136 - 145 mmol/L Final   • Potassium 09/05/2022 4.1  3.5 - 5.2 mmol/L Final   • Chloride 09/05/2022 98  98 - 107 mmol/L Final   • CO2 09/05/2022 29.2 (H)  22.0 - 29.0 mmol/L Final   • Calcium 09/05/2022 9.3  8.6 - 10.5 mg/dL Final   • Total Protein 09/05/2022 7.7  6.0 - 8.5 g/dL Final   • Albumin 09/05/2022 4.00  3.50 - 5.20 g/dL Final   • ALT (SGPT) 09/05/2022 8  1 - 33 U/L Final   • AST (SGOT) 09/05/2022 11  1 - 32 U/L Final   • Alkaline Phosphatase 09/05/2022 99  39 - 117 U/L Final   • Total Bilirubin 09/05/2022 0.3  0.0 - 1.2 mg/dL Final   • Globulin 09/05/2022 3.7  gm/dL Final   • A/G Ratio 09/05/2022 1.1  g/dL Final   • BUN/Creatinine Ratio 09/05/2022 27.1 (H)  7.0 - 25.0 Final   • Anion Gap 09/05/2022 9.8  5.0 - 15.0 mmol/L Final   • eGFR 09/05/2022 111.3  >60.0 mL/min/1.73 Final    National Kidney Foundation and American Society of Nephrology (ASN) Task Force recommended calculation based on the Chronic Kidney Disease Epidemiology  Collaboration (CKD-EPI) equation refit without adjustment for race.   • Lipase 09/05/2022 17  13 - 60 U/L Final   • proBNP 09/05/2022 147.3  0.0 - 900.0 pg/mL Final   • Magnesium 09/05/2022 1.9  1.6 - 2.6 mg/dL Final   • Extra Tube 09/05/2022 Hold for add-ons.   Final    Auto resulted.   • Extra Tube 09/05/2022 hold for add-on   Final    Auto resulted   • Extra Tube 09/05/2022 Hold for add-ons.   Final    Auto resulted.   • Extra Tube 09/05/2022 Hold for add-ons.   Final    Auto resulted   • Extra Tube 09/05/2022 Hold for add-ons.   Final    Auto resulted.   • WBC 09/05/2022 6.39  3.40 - 10.80 10*3/mm3 Final   • RBC 09/05/2022 4.91  3.77 - 5.28 10*6/mm3 Final   • Hemoglobin 09/05/2022 13.2  12.0 - 15.9 g/dL Final   • Hematocrit 09/05/2022 40.8  34.0 - 46.6 % Final   • MCV 09/05/2022 83.1  79.0 - 97.0 fL Final   • MCH 09/05/2022 26.9  26.6 - 33.0 pg Final   • MCHC 09/05/2022 32.4  31.5 - 35.7 g/dL Final   • RDW 09/05/2022 14.1  12.3 - 15.4 % Final   • RDW-SD 09/05/2022 42.8  37.0 - 54.0 fl Final   • MPV 09/05/2022 9.9  6.0 - 12.0 fL Final   • Platelets 09/05/2022 187  140 - 450 10*3/mm3 Final   • Neutrophil % 09/05/2022 81.3 (H)  42.7 - 76.0 % Final   • Lymphocyte % 09/05/2022 11.4 (L)  19.6 - 45.3 % Final   • Monocyte % 09/05/2022 5.2  5.0 - 12.0 % Final   • Eosinophil % 09/05/2022 1.6  0.3 - 6.2 % Final   • Basophil % 09/05/2022 0.0  0.0 - 1.5 % Final   • Immature Grans % 09/05/2022 0.5  0.0 - 0.5 % Final   • Neutrophils, Absolute 09/05/2022 5.20  1.70 - 7.00 10*3/mm3 Final   • Lymphocytes, Absolute 09/05/2022 0.73  0.70 - 3.10 10*3/mm3 Final   • Monocytes, Absolute 09/05/2022 0.33  0.10 - 0.90 10*3/mm3 Final   • Eosinophils, Absolute 09/05/2022 0.10  0.00 - 0.40 10*3/mm3 Final   • Basophils, Absolute 09/05/2022 0.00  0.00 - 0.20 10*3/mm3 Final   • Immature Grans, Absolute 09/05/2022 0.03  0.00 - 0.05 10*3/mm3 Final   • nRBC 09/05/2022 0.0  0.0 - 0.2 /100 WBC Final   • COVID19 09/05/2022 Not Detected  Not Detected  - Ref. Range Final   • Troponin I 09/05/2022 0.00  0.00 - 0.08 ng/mL Final    Serial Number: [id number]:  [id number]   Lab Requisition on 08/08/2022   Component Date Value Ref Range Status   • Glucose 08/08/2022 137 (H)  65 - 99 mg/dL Final   • BUN 08/08/2022 10  6 - 20 mg/dL Final   • Creatinine 08/08/2022 0.56 (L)  0.57 - 1.00 mg/dL Final   • Sodium 08/08/2022 140  136 - 145 mmol/L Final   • Potassium 08/08/2022 4.1  3.5 - 5.2 mmol/L Final    Slight hemolysis detected by analyzer. Results may be affected.   • Chloride 08/08/2022 101  98 - 107 mmol/L Final   • CO2 08/08/2022 28.0  22.0 - 29.0 mmol/L Final   • Calcium 08/08/2022 9.2  8.6 - 10.5 mg/dL Final   • Total Protein 08/08/2022 7.5  6.0 - 8.5 g/dL Final   • Albumin 08/08/2022 3.90  3.50 - 5.20 g/dL Final   • ALT (SGPT) 08/08/2022 8  1 - 33 U/L Final   • AST (SGOT) 08/08/2022 13  1 - 32 U/L Final   • Alkaline Phosphatase 08/08/2022 91  39 - 117 U/L Final   • Total Bilirubin 08/08/2022 0.2  0.0 - 1.2 mg/dL Final   • Globulin 08/08/2022 3.6  gm/dL Final   • A/G Ratio 08/08/2022 1.1  g/dL Final   • BUN/Creatinine Ratio 08/08/2022 17.9  7.0 - 25.0 Final   • Anion Gap 08/08/2022 11.0  5.0 - 15.0 mmol/L Final   • eGFR 08/08/2022 107.3  >60.0 mL/min/1.73 Final    National Kidney Foundation and American Society of Nephrology (ASN) Task Force recommended calculation based on the Chronic Kidney Disease Epidemiology Collaboration (CKD-EPI) equation refit without adjustment for race.   • Color, UA 08/08/2022 Yellow  Yellow, Straw Final   • Appearance, UA 08/08/2022 Cloudy (A)  Clear Final   • pH, UA 08/08/2022 5.5  5.0 - 8.0 Final   • Specific Gravity, UA 08/08/2022 >1.030 (H)  1.005 - 1.030 Final   • Glucose, UA 08/08/2022 Negative  Negative Final   • Ketones, UA 08/08/2022 Negative  Negative Final   • Bilirubin, UA 08/08/2022 Negative  Negative Final   • Blood, UA 08/08/2022 Small (1+) (A)  Negative Final   • Protein, UA 08/08/2022 Trace (A)  Negative Final   • Leuk  Esterase, UA 08/08/2022 Large (3+) (A)  Negative Final   • Nitrite, UA 08/08/2022 Negative  Negative Final   • Urobilinogen, UA 08/08/2022 1.0 E.U./dL  0.2 - 1.0 E.U./dL Final   • Urine Culture 08/08/2022 No growth   Final   • Blood Culture 08/08/2022 No growth at 5 days   Final   • Lactoferrin, Qual 08/08/2022 Negative  Negative Final   • Wound Culture 08/08/2022 No growth at 3 days   Final   • Gram Stain 08/08/2022 No organisms seen   Final   • Wound Culture 08/08/2022 Scant growth (1+) Normal Skin Vanna   Final   • Gram Stain 08/08/2022 No organisms seen   Final   • Gram Stain 08/08/2022 Rare (1+) WBCs seen   Final   • WBC 08/08/2022 4.36  3.40 - 10.80 10*3/mm3 Final   • RBC 08/08/2022 4.57  3.77 - 5.28 10*6/mm3 Final   • Hemoglobin 08/08/2022 12.2  12.0 - 15.9 g/dL Final   • Hematocrit 08/08/2022 38.9  34.0 - 46.6 % Final   • MCV 08/08/2022 85.1  79.0 - 97.0 fL Final   • MCH 08/08/2022 26.7  26.6 - 33.0 pg Final   • MCHC 08/08/2022 31.4 (L)  31.5 - 35.7 g/dL Final   • RDW 08/08/2022 14.6  12.3 - 15.4 % Final   • RDW-SD 08/08/2022 44.9  37.0 - 54.0 fl Final   • MPV 08/08/2022 10.7  6.0 - 12.0 fL Final   • Platelets 08/08/2022 145  140 - 450 10*3/mm3 Final   • Neutrophil % 08/08/2022 56.1  42.7 - 76.0 % Final   • Lymphocyte % 08/08/2022 34.2  19.6 - 45.3 % Final   • Monocyte % 08/08/2022 6.9  5.0 - 12.0 % Final   • Eosinophil % 08/08/2022 2.1  0.3 - 6.2 % Final   • Basophil % 08/08/2022 0.2  0.0 - 1.5 % Final   • Immature Grans % 08/08/2022 0.5  0.0 - 0.5 % Final   • Neutrophils, Absolute 08/08/2022 2.45  1.70 - 7.00 10*3/mm3 Final   • Lymphocytes, Absolute 08/08/2022 1.49  0.70 - 3.10 10*3/mm3 Final   • Monocytes, Absolute 08/08/2022 0.30  0.10 - 0.90 10*3/mm3 Final   • Eosinophils, Absolute 08/08/2022 0.09  0.00 - 0.40 10*3/mm3 Final   • Basophils, Absolute 08/08/2022 0.01  0.00 - 0.20 10*3/mm3 Final   • Immature Grans, Absolute 08/08/2022 0.02  0.00 - 0.05 10*3/mm3 Final   • nRBC 08/08/2022 0.0  0.0 - 0.2 /100  WBC Final   • RBC, UA 08/08/2022 6-12 (A)  None Seen /HPF Final   • WBC, UA 08/08/2022 Too Numerous to Count (A)  None Seen /HPF Final   • Bacteria, UA 08/08/2022 1+ (A)  None Seen /HPF Final   • Squamous Epithelial Cells, UA 08/08/2022 3-6 (A)  None Seen, 0-2 /HPF Final   • Hyaline Casts, UA 08/08/2022 3-6  None Seen /LPF Final   • Methodology 08/08/2022 Automated Microscopy   Final   Lab Requisition on 07/28/2022   Component Date Value Ref Range Status   • Glucose 07/28/2022 171 (H)  65 - 99 mg/dL Final   • BUN 07/28/2022 6  6 - 20 mg/dL Final   • Creatinine 07/28/2022 0.50 (L)  0.57 - 1.00 mg/dL Final   • Sodium 07/28/2022 139  136 - 145 mmol/L Final   • Potassium 07/28/2022 3.6  3.5 - 5.2 mmol/L Final   • Chloride 07/28/2022 99  98 - 107 mmol/L Final   • CO2 07/28/2022 26.0  22.0 - 29.0 mmol/L Final   • Calcium 07/28/2022 8.3 (L)  8.6 - 10.5 mg/dL Final   • Total Protein 07/28/2022 7.0  6.0 - 8.5 g/dL Final   • Albumin 07/28/2022 3.90  3.50 - 5.20 g/dL Final   • ALT (SGPT) 07/28/2022 8  1 - 33 U/L Final   • AST (SGOT) 07/28/2022 10  1 - 32 U/L Final   • Alkaline Phosphatase 07/28/2022 80  39 - 117 U/L Final   • Total Bilirubin 07/28/2022 0.2  0.0 - 1.2 mg/dL Final   • Globulin 07/28/2022 3.1  gm/dL Final   • A/G Ratio 07/28/2022 1.3  g/dL Final   • BUN/Creatinine Ratio 07/28/2022 12.0  7.0 - 25.0 Final   • Anion Gap 07/28/2022 14.0  5.0 - 15.0 mmol/L Final   • eGFR 07/28/2022 110.2  >60.0 mL/min/1.73 Final    National Kidney Foundation and American Society of Nephrology (ASN) Task Force recommended calculation based on the Chronic Kidney Disease Epidemiology Collaboration (CKD-EPI) equation refit without adjustment for race.   • Sed Rate 07/28/2022 25  0 - 30 mm/hr Final   • C-Reactive Protein 07/28/2022 1.18 (H)  0.00 - 0.50 mg/dL Final   • Creatine Kinase 07/28/2022 38  20 - 180 U/L Final   • WBC 07/28/2022 4.93  3.40 - 10.80 10*3/mm3 Final   • RBC 07/28/2022 4.64  3.77 - 5.28 10*6/mm3 Final   • Hemoglobin  07/28/2022 12.2  12.0 - 15.9 g/dL Final   • Hematocrit 07/28/2022 38.6  34.0 - 46.6 % Final   • MCV 07/28/2022 83.2  79.0 - 97.0 fL Final   • MCH 07/28/2022 26.3 (L)  26.6 - 33.0 pg Final   • MCHC 07/28/2022 31.6  31.5 - 35.7 g/dL Final   • RDW 07/28/2022 14.4  12.3 - 15.4 % Final   • RDW-SD 07/28/2022 42.5  37.0 - 54.0 fl Final   • MPV 07/28/2022 11.3  6.0 - 12.0 fL Final   • Platelets 07/28/2022 170  140 - 450 10*3/mm3 Final   • Neutrophil % 07/28/2022 66.6  42.7 - 76.0 % Final   • Lymphocyte % 07/28/2022 23.5  19.6 - 45.3 % Final   • Monocyte % 07/28/2022 7.3  5.0 - 12.0 % Final   • Eosinophil % 07/28/2022 2.0  0.3 - 6.2 % Final   • Basophil % 07/28/2022 0.2  0.0 - 1.5 % Final   • Immature Grans % 07/28/2022 0.4  0.0 - 0.5 % Final   • Neutrophils, Absolute 07/28/2022 3.28  1.70 - 7.00 10*3/mm3 Final   • Lymphocytes, Absolute 07/28/2022 1.16  0.70 - 3.10 10*3/mm3 Final   • Monocytes, Absolute 07/28/2022 0.36  0.10 - 0.90 10*3/mm3 Final   • Eosinophils, Absolute 07/28/2022 0.10  0.00 - 0.40 10*3/mm3 Final   • Basophils, Absolute 07/28/2022 0.01  0.00 - 0.20 10*3/mm3 Final   • Immature Grans, Absolute 07/28/2022 0.02  0.00 - 0.05 10*3/mm3 Final   • nRBC 07/28/2022 0.0  0.0 - 0.2 /100 WBC Final   Lab Requisition on 07/22/2022   Component Date Value Ref Range Status   • Glucose 07/22/2022 143 (H)  65 - 99 mg/dL Final   • BUN 07/22/2022 13  6 - 20 mg/dL Final   • Creatinine 07/22/2022 0.53 (L)  0.57 - 1.00 mg/dL Final   • Sodium 07/22/2022 138  136 - 145 mmol/L Final   • Potassium 07/22/2022 4.4  3.5 - 5.2 mmol/L Final   • Chloride 07/22/2022 101  98 - 107 mmol/L Final   • CO2 07/22/2022 27.8  22.0 - 29.0 mmol/L Final   • Calcium 07/22/2022 9.2  8.6 - 10.5 mg/dL Final   • Total Protein 07/22/2022 7.2  6.0 - 8.5 g/dL Final   • Albumin 07/22/2022 4.00  3.50 - 5.20 g/dL Final   • ALT (SGPT) 07/22/2022 7  1 - 33 U/L Final   • AST (SGOT) 07/22/2022 13  1 - 32 U/L Final   • Alkaline Phosphatase 07/22/2022 58  39 - 117 U/L  Final   • Total Bilirubin 07/22/2022 0.2  0.0 - 1.2 mg/dL Final   • Globulin 07/22/2022 3.2  gm/dL Final   • A/G Ratio 07/22/2022 1.3  g/dL Final   • BUN/Creatinine Ratio 07/22/2022 24.5  7.0 - 25.0 Final   • Anion Gap 07/22/2022 9.2  5.0 - 15.0 mmol/L Final   • eGFR 07/22/2022 108.7  >60.0 mL/min/1.73 Final    National Kidney Foundation and American Society of Nephrology (ASN) Task Force recommended calculation based on the Chronic Kidney Disease Epidemiology Collaboration (CKD-EPI) equation refit without adjustment for race.   • C-Reactive Protein 07/22/2022 1.21 (H)  0.00 - 0.50 mg/dL Final   • Sed Rate 07/22/2022 23  0 - 30 mm/hr Final   • Creatine Kinase 07/22/2022 23  20 - 180 U/L Final   • WBC 07/22/2022 4.32  3.40 - 10.80 10*3/mm3 Final   • RBC 07/22/2022 4.61  3.77 - 5.28 10*6/mm3 Final   • Hemoglobin 07/22/2022 12.1  12.0 - 15.9 g/dL Final   • Hematocrit 07/22/2022 39.9  34.0 - 46.6 % Final   • MCV 07/22/2022 86.6  79.0 - 97.0 fL Final   • MCH 07/22/2022 26.2 (L)  26.6 - 33.0 pg Final   • MCHC 07/22/2022 30.3 (L)  31.5 - 35.7 g/dL Final   • RDW 07/22/2022 15.0  12.3 - 15.4 % Final   • RDW-SD 07/22/2022 47.5  37.0 - 54.0 fl Final   • MPV 07/22/2022 11.4  6.0 - 12.0 fL Final   • Platelets 07/22/2022 146  140 - 450 10*3/mm3 Final   • Neutrophil % 07/22/2022 56.2  42.7 - 76.0 % Final   • Lymphocyte % 07/22/2022 30.6  19.6 - 45.3 % Final   • Monocyte % 07/22/2022 10.0  5.0 - 12.0 % Final   • Eosinophil % 07/22/2022 2.8  0.3 - 6.2 % Final   • Basophil % 07/22/2022 0.2  0.0 - 1.5 % Final   • Immature Grans % 07/22/2022 0.2  0.0 - 0.5 % Final   • Neutrophils, Absolute 07/22/2022 2.43  1.70 - 7.00 10*3/mm3 Final   • Lymphocytes, Absolute 07/22/2022 1.32  0.70 - 3.10 10*3/mm3 Final   • Monocytes, Absolute 07/22/2022 0.43  0.10 - 0.90 10*3/mm3 Final   • Eosinophils, Absolute 07/22/2022 0.12  0.00 - 0.40 10*3/mm3 Final   • Basophils, Absolute 07/22/2022 0.01  0.00 - 0.20 10*3/mm3 Final   • Immature Grans, Absolute  07/22/2022 0.01  0.00 - 0.05 10*3/mm3 Final   • nRBC 07/22/2022 0.0  0.0 - 0.2 /100 WBC Final   Office Visit on 07/01/2022   Component Date Value Ref Range Status   • Color 07/01/2022 [place]  Yellow, Straw, Dark Yellow, [place] Final   • Clarity, UA 07/01/2022 Cloudy (A)  Clear Final   • Glucose, UA 07/01/2022 Negative  Negative mg/dL Final   • Bilirubin 07/01/2022 Small (1+) (A)  Negative Final   • Ketones, UA 07/01/2022 Trace (A)  Negative Final   • Specific Gravity  07/01/2022 1.030  1.005 - 1.030 Final   • Blood, UA 07/01/2022 Trace (A)  Negative Final   • pH, Urine 07/01/2022 5.5  5.0 - 8.0 Final   • Protein, POC 07/01/2022 30 mg/dL (A)  Negative mg/dL Final   • Urobilinogen, UA 07/01/2022 Normal  Normal Final   • Leukocytes 07/01/2022 Small (1+) (A)  Negative Final   • Nitrite, UA 07/01/2022 Negative  Negative Final   Admission on 04/13/2022, Discharged on 04/13/2022   Component Date Value Ref Range Status   • Glucose 04/13/2022 135 (H)  70 - 99 mg/dL Final    Serial Number: [id number]:  [id number]   • Case Report 04/13/2022    Final                    Value:Surgical Pathology Report                         Case: [id number]                                  Authorizing Provider:  Karine Orlando MD Collected:           04/13/2022 12:53 PM          Ordering Location:     [facility] Received:            04/13/2022 01:54 PM                                 SUITES                                                                       Pathologist:           Deepa Calzada MD                                                     Specimen:    Large Intestine, Sigmoid Colon, SIGMOID COLON POLYP SNARE                                 • Clinical Information 04/13/2022    Final    This result contains rich text formatting which cannot be displayed here.   • Final Diagnosis 04/13/2022    Final                    Value:This result contains rich text formatting which cannot be displayed here.   •  Gross Description 04/13/2022    Final                    Value:This result contains rich text formatting which cannot be displayed here.   • Microscopic Description 04/13/2022    Final    This result contains rich text formatting which cannot be displayed here.   Lab Requisition on 04/12/2022   Component Date Value Ref Range Status   • Glucose 04/12/2022 181 (H)  65 - 99 mg/dL Final   • BUN 04/12/2022 12  6 - 20 mg/dL Final   • Creatinine 04/12/2022 0.55 (L)  0.57 - 1.00 mg/dL Final   • Sodium 04/12/2022 137  136 - 145 mmol/L Final   • Potassium 04/12/2022 4.1  3.5 - 5.2 mmol/L Final   • Chloride 04/12/2022 98  98 - 107 mmol/L Final   • CO2 04/12/2022 25.8  22.0 - 29.0 mmol/L Final   • Calcium 04/12/2022 9.4  8.6 - 10.5 mg/dL Final   • Total Protein 04/12/2022 7.4  6.0 - 8.5 g/dL Final   • Albumin 04/12/2022 4.40  3.50 - 5.20 g/dL Final   • ALT (SGPT) 04/12/2022 8  1 - 33 U/L Final   • AST (SGOT) 04/12/2022 18  1 - 32 U/L Final   • Alkaline Phosphatase 04/12/2022 56  39 - 117 U/L Final   • Total Bilirubin 04/12/2022 0.2  0.0 - 1.2 mg/dL Final   • Globulin 04/12/2022 3.0  gm/dL Final   • A/G Ratio 04/12/2022 1.5  g/dL Final   • BUN/Creatinine Ratio 04/12/2022 21.8  7.0 - 25.0 Final   • Anion Gap 04/12/2022 13.2  5.0 - 15.0 mmol/L Final   • eGFR 04/12/2022 107.7  >60.0 mL/min/1.73 Final    National Kidney Foundation and American Society of Nephrology (ASN) Task Force recommended calculation based on the Chronic Kidney Disease Epidemiology Collaboration (CKD-EPI) equation refit without adjustment for race.   • C-Reactive Protein 04/12/2022 1.39 (H)  0.00 - 0.50 mg/dL Final   • Sed Rate 04/12/2022 12  0 - 30 mm/hr Final   • Creatine Kinase 04/12/2022 19 (L)  20 - 180 U/L Final   • WBC 04/12/2022 5.56  3.40 - 10.80 10*3/mm3 Final   • RBC 04/12/2022 5.11  3.77 - 5.28 10*6/mm3 Final   • Hemoglobin 04/12/2022 13.3  12.0 - 15.9 g/dL Final   • Hematocrit 04/12/2022 44.8  34.0 - 46.6 % Final   • MCV 04/12/2022 87.7  79.0 -  97.0 fL Final   • MCH 04/12/2022 26.0 (L)  26.6 - 33.0 pg Final   • MCHC 04/12/2022 29.7 (L)  31.5 - 35.7 g/dL Final   • RDW 04/12/2022 15.7 (H)  12.3 - 15.4 % Final   • RDW-SD 04/12/2022 49.9  37.0 - 54.0 fl Final   • MPV 04/12/2022 10.7  6.0 - 12.0 fL Final   • Platelets 04/12/2022 174  140 - 450 10*3/mm3 Final   • Neutrophil % 04/12/2022 65.6  42.7 - 76.0 % Final   • Lymphocyte % 04/12/2022 23.7  19.6 - 45.3 % Final   • Monocyte % 04/12/2022 7.0  5.0 - 12.0 % Final   • Eosinophil % 04/12/2022 3.1  0.3 - 6.2 % Final   • Basophil % 04/12/2022 0.2  0.0 - 1.5 % Final   • Immature Grans % 04/12/2022 0.4  0.0 - 0.5 % Final   • Neutrophils, Absolute 04/12/2022 3.65  1.70 - 7.00 10*3/mm3 Final   • Lymphocytes, Absolute 04/12/2022 1.32  0.70 - 3.10 10*3/mm3 Final   • Monocytes, Absolute 04/12/2022 0.39  0.10 - 0.90 10*3/mm3 Final   • Eosinophils, Absolute 04/12/2022 0.17  0.00 - 0.40 10*3/mm3 Final   • Basophils, Absolute 04/12/2022 0.01  0.00 - 0.20 10*3/mm3 Final   • Immature Grans, Absolute 04/12/2022 0.02  0.00 - 0.05 10*3/mm3 Final   • nRBC 04/12/2022 0.0  0.0 - 0.2 /100 WBC Final   There may be more visits with results that are not included.       EKG Results:  No orders to display       Imaging Results:  CT Abdomen Pelvis Without Contrast    Result Date: 8/30/2022     1. No acute process demonstrated  2. No adenopathy in the abdomen and pelvis  3. Splenomegaly  4. Small nonobstructing right renal calculus     TERRA RUIZ MD       Electronically Signed and Approved By: TERRA RUIZ MD on 8/30/2022 at 12:21             CT Soft Tissue Neck Without Contrast    Result Date: 8/30/2022    CT scan of the soft tissues of the neck without IV contrast demonstrating no soft tissue mass or adenopathy.     MANISH LIM MD       Electronically Signed and Approved By: MANISH LIM MD on 8/30/2022 at 12:10             XR Chest 1 View    Result Date: 9/5/2022   No acute cardiopulmonary abnormality is identified.       GUDELIA  KYARA NUÑEZ MD       Electronically Signed and Approved By: GUDELIA NUÑEZ MD on 9/05/2022 at 22:01             CT Chest Low Dose Cancer Screening WO    Result Date: 8/30/2022     1. Multiple new less than 5 mm bilateral upper lobe nodules, likely infectious  2. New ground-glass opacity in the inferior left upper lobe and focal airspace consolidation in the right lower lobe.  Findings may represent infection, correlate with any current respiratory symptoms.  Recommend follow-up chest CT in 1-2 months to assess for resolution  Lung rads category 2 S. As noted, a short-term follow-up chest CT is recommended to reassess the new lung findings.  Low-dose CT lung screening should be performed in 12 months      TERRA RUIZ MD       Electronically Signed and Approved By: TERRA RUIZ MD on 8/30/2022 at 11:59             NM PET/CT Skull Base to Mid Thigh    Result Date: 10/4/2022    1. No evidence of lymphadenopathy on this examination. 2. Small bilateral consolidations as described above with no significant FDG avidity, likely evolving infectious or inflammatory processes.  No concerning pulmonary nodule identified.     MORALES TESFAYE MD       Electronically Signed and Approved By: MORALES TESFAYE MD on 10/04/2022 at 11:10                 Assessment & Plan   Diagnoses and all orders for this visit:    1. Generalized anxiety disorder (Primary)  -     POC Glucose Fingerstick    2. Severe episode of recurrent major depressive disorder, without psychotic features (HCC)  -     Cariprazine HCl (Vraylar) 1.5 MG capsule capsule; Take 1 capsule by mouth Daily for 21 days.  Dispense: 21 capsule; Refill: 0    3. Panic disorder    4. Post traumatic stress disorder (PTSD)    5. Insomnia, unspecified type    6. Cigarette nicotine dependence, uncomplicated    Presentation seems most consistent with JEFFREY, MDD, panic disorder, PTSD, and insomnia.  I extensively discussed with the patient that she is not appropriate candidate for  Xanax based on having a pain pump, lung disease, and other drug interactions.  I also discussed that Xanax is not an appropriate maintenance medication and has not ideal for long-term use.  Patient argumentative in visit, but is open to trying other medications.  Patient has not tried Lamictal or Abilify.  Discussed medication options such as Vraylar.  We will start on Vraylar for management depression and overall mood.  Patient given samples in the office.  Counseled patient on smoking cessation, which patient declines treatment at this time.  Discussed the need for psychotherapy, which patient declines.  Follow-up in 1 month.  Addressed all questions and concerns.    Visit Diagnoses:    ICD-10-CM ICD-9-CM   1. Generalized anxiety disorder  F41.1 300.02   2. Severe episode of recurrent major depressive disorder, without psychotic features (HCC)  F33.2 296.33   3. Panic disorder  F41.0 300.01   4. Post traumatic stress disorder (PTSD)  F43.10 309.81   5. Insomnia, unspecified type  G47.00 780.52   6. Cigarette nicotine dependence, uncomplicated  F17.210 305.1       PLAN:  1. Safety: No acute safety concerns at this time.  2. Therapy: Patient declines referral for psychotherapy.  3. Risk Assessment: Risk of self-harm acutely is moderate.  Risk factors include anxiety disorder, mood disorder, and recent psychosocial stressors (pandemic). Protective factors include no family history, denies access to guns/weapons, no present SI, no history of suicide attempts or self-harm in the past, minimal AODA, healthcare seeking, future orientation, willingness to engage in care.  Risk of self-harm chronically is also moderate, but could be further elevated in the event of treatment noncompliance and/or AODA.  4. Labs/Diagnostics Ordered:   Orders Placed This Encounter   Procedures   • POC Glucose Fingerstick     5. Medications:   New Medications Ordered This Visit   Medications   • Cariprazine HCl (Vraylar) 1.5 MG capsule capsule      Sig: Take 1 capsule by mouth Daily for 21 days.     Dispense:  21 capsule     Refill:  0     Vraylar, Risks, benefits, alternatives discussed with patient including nausea and vomiting, GI upset, sedation, akathisia, theoretical risk of tardive dyskinesia, and weight gain. After discussion of these risks and benefits, the patient voiced understanding and agreed to proceed.      6. Follow up:   F/u in 1 month.    TREATMENT PLAN/GOALS: Continue supportive psychotherapy efforts and medications as indicated. Treatment and medication options discussed during today's visit. Patient ackowledged and verbally consented to continue with current treatment plan and was educated on the importance of compliance with treatment and follow-up appointments.    MEDICATION ISSUES:  TERRELL reviewed as expected.  Discussed medication options and treatment plan of prescribed medication as well as the risks, benefits, and side effects including potential falls, possible impaired driving and metabolic adversities among others. Patient is agreeable to call the office with any worsening of symptoms or onset of side effects. Patient is agreeable to call 911 or go to the nearest ER should he/she begin having SI/HI. No medication side effects or related complaints today.            This document has been electronically signed by Diamond Arcos PA-C  October 21, 2022 13:45 EDT      Part of this note may be an electronic transcription/translation of spoken language to printed text using the Dragon Dictation System.

## 2022-10-24 ENCOUNTER — OFFICE VISIT (OUTPATIENT)
Dept: FAMILY MEDICINE CLINIC | Facility: CLINIC | Age: 57
End: 2022-10-24

## 2022-10-24 VITALS
OXYGEN SATURATION: 97 % | HEART RATE: 83 BPM | WEIGHT: 161 LBS | TEMPERATURE: 98.1 F | HEIGHT: 66 IN | DIASTOLIC BLOOD PRESSURE: 78 MMHG | SYSTOLIC BLOOD PRESSURE: 128 MMHG | BODY MASS INDEX: 25.88 KG/M2

## 2022-10-24 DIAGNOSIS — L97.222 VENOUS STASIS ULCER OF LEFT CALF WITH FAT LAYER EXPOSED WITHOUT VARICOSE VEINS: Primary | ICD-10-CM

## 2022-10-24 DIAGNOSIS — I87.2 VENOUS STASIS ULCER OF RIGHT CALF WITH FAT LAYER EXPOSED WITHOUT VARICOSE VEINS: ICD-10-CM

## 2022-10-24 DIAGNOSIS — L97.212 VENOUS STASIS ULCER OF RIGHT CALF WITH FAT LAYER EXPOSED WITHOUT VARICOSE VEINS: ICD-10-CM

## 2022-10-24 DIAGNOSIS — I87.2 VENOUS STASIS ULCER OF LEFT CALF WITH FAT LAYER EXPOSED WITHOUT VARICOSE VEINS: Primary | ICD-10-CM

## 2022-10-24 DIAGNOSIS — F32.A DEPRESSION, UNSPECIFIED DEPRESSION TYPE: ICD-10-CM

## 2022-10-24 DIAGNOSIS — F41.1 GENERALIZED ANXIETY DISORDER: ICD-10-CM

## 2022-10-24 DIAGNOSIS — M25.541 JOINT PAIN IN BOTH HANDS: ICD-10-CM

## 2022-10-24 DIAGNOSIS — M25.542 JOINT PAIN IN BOTH HANDS: ICD-10-CM

## 2022-10-24 PROCEDURE — 99214 OFFICE O/P EST MOD 30 MIN: CPT

## 2022-10-24 PROCEDURE — 87147 CULTURE TYPE IMMUNOLOGIC: CPT

## 2022-10-24 PROCEDURE — 87070 CULTURE OTHR SPECIMN AEROBIC: CPT

## 2022-10-24 PROCEDURE — 87186 SC STD MICRODIL/AGAR DIL: CPT

## 2022-10-24 PROCEDURE — 87205 SMEAR GRAM STAIN: CPT

## 2022-10-24 RX ORDER — LEVOFLOXACIN 500 MG/1
500 TABLET, FILM COATED ORAL DAILY
Qty: 7 TABLET | Refills: 0 | Status: CANCELLED | OUTPATIENT
Start: 2022-10-24 | End: 2022-10-31

## 2022-10-24 RX ORDER — PREDNISONE 20 MG/1
20 TABLET ORAL DAILY
Qty: 5 TABLET | Refills: 0 | Status: SHIPPED | OUTPATIENT
Start: 2022-10-24 | End: 2022-10-29

## 2022-10-24 RX ORDER — GINSENG 100 MG
1 CAPSULE ORAL 2 TIMES DAILY
Qty: 28 G | Refills: 5 | Status: SHIPPED | OUTPATIENT
Start: 2022-10-24

## 2022-10-24 NOTE — PROGRESS NOTES
Chief Complaint  Chief Complaint   Patient presents with   • sore on legs       Subjective      Isha Llanes presents to [facility]  Wound Infection  This is a chronic problem. The current episode started more than 1 month ago. The problem occurs constantly. The problem has been gradually worsening. Associated symptoms include arthralgias and weakness. The symptoms are aggravated by standing and walking. She has tried position changes and rest for the symptoms. The treatment provided no relief.     Patient presents today with concerns of worsening wounds to legs. She was previously seeing wound management but doesn't want to go back as the treatments they provided did not improve the wounds as much as she had hoped. Her PCP has prescribed IV antibiotics in the past and provided her with ointments and supplies to wrap her legs. She receives wound supplies from [employer]. She anticipates that they will be sending her a shipment today.  Patient has previously been receiving wound management from home health as well but was reportedly released from home health as they told her that she was not being compliant with advised treatments.  She denies any fever or chills.     Patient has chronic joint pain especially in her bilateral hands.  She states this is gotten much worse over the past couple of months.  She also reports that she has progressively gotten weaker and is unable to ambulate as well as she previously did due to pain.    Patient was recently seen by psychiatry to discuss depression and anxiety.  She was started on Vraylar 1.5 mg daily for these issues.  She states she took 1 dose of the Vraylar and did not like the way it made her feel so she refuses to continue taking this medication.      Objective     Medical History:  Past Medical History:   Diagnosis Date   • Anxiety     NO CURRENT MEDICATION   • Aortic stenosis, severe    • Arthritis    • Asthma    • Back pain    •  Blood clotting disorder (HCC)    • Cancer associated pain    • Chronic low back pain with sciatica    • Chronic pain disorder    • Diabetes mellitus (HCC)     TYPE 2   • Dyspnea on effort    • GERD (gastroesophageal reflux disease)    • H/O lumpectomy    • H/O: hysterectomy    • Hiatal hernia    • History of degenerative disc disease    • History of kidney stones    • History of pulmonary embolus (PE)    • History of transfusion    • Hodgkin's lymphoma (HCC)    • Immobility    • Liver disease    • Lupus (HCC)    • Mitral valve prolapse    • Panic disorder    • Pelvic pain    • Peripheral neuropathy    • Personal history of DVT (deep vein thrombosis)    • Presence of intrathecal pump    • PTSD (post-traumatic stress disorder)    • Sacroiliac joint disease    • SI (sacroiliac) joint inflammation (HCC)    • Venous insufficiency      Past Surgical History:   Procedure Laterality Date   • BACK SURGERY      SPINAL FUSION    • BLADDER REPAIR     • CARDIAC CATHETERIZATION N/A 3/6/2019    Procedure: Valvuloplasty;  Surgeon: Wayne Johnson MD;  Location: [facility] INVASIVE LOCATION;  Service: Cardiology   • CARDIAC VALVE REPLACEMENT  2021   • CHOLECYSTECTOMY     • COLONOSCOPY N/A 12/29/2021    Procedure: COLONOSCOPY;  Surgeon: Karine Orlando MD;  Location: [facility] ENDOSCOPY;  Service: Gastroenterology;  Laterality: N/A;  POOR PREP   • COLONOSCOPY N/A 4/13/2022    Procedure: COLONOSCOPY WITH POLYPECTOMY;  Surgeon: Karine Orlando MD;  Location: [facility] ENDOSCOPY;  Service: Gastroenterology;  Laterality: N/A;  COLON POLYP, HEMORRHOIDS, POOR PREP   • ENDOSCOPY N/A 12/29/2021    Procedure: ESOPHAGOGASTRODUODENOSCOPY;  Surgeon: Karine Orlando MD;  Location: [facility] ENDOSCOPY;  Service: Gastroenterology;  Laterality: N/A;  ESOPHAGITIS, GASTRITIS, HIATAL HERNIA   • HYSTERECTOMY     • LYMPHADENECTOMY     • PACEMAKER IMPLANTATION     • PAIN PUMP INSERTION/REVISION N/A 10/18/2019    Procedure: PAIN PUMP  [facility] 10-18-19 PEDRO;  Surgeon: Horace Rios MD;  Location: [facility] MAIN OR;  Service: Pain Management   • PAIN PUMP INSERTION/REVISION N/A 11/23/2020    Procedure: pain pump removal;  Surgeon: Horace Rios MD;  Location: [facility] MAIN OR;  Service: Pain Management;  Laterality: N/A;   • TONSILLECTOMY     • TUBAL ABDOMINAL LIGATION     • TUMOR REMOVAL        Social History     Tobacco Use   • Smoking status: Every Day     Packs/day: 2.00     Years: 41.00     Pack years: 82.00     Types: Cigarettes   • Smokeless tobacco: Never   Vaping Use   • Vaping Use: Never used   Substance Use Topics   • Alcohol use: No   • Drug use: No     Family History   Problem Relation Age of Onset   • Anxiety disorder Mother    • Depression Sister    • Bipolar disorder Sister    • Pancreatic cancer Sister    • ADD / ADHD Brother    • Pancreatic cancer Paternal Grandmother    • ADD / ADHD Granddaughter    • ADD / ADHD Grandson    • ADD / ADHD Nephew    • Malig Hyperthermia Neg Hx        Medications:  Prior to Admission medications    Medication Sig Start Date End Date Taking? Authorizing Provider   acetaminophen (TYLENOL) 500 MG tablet Take 1,000-1,500 mg by mouth Every 6 (Six) Hours As Needed for Mild Pain .   Yes Provider, MD Nette   BMYRA UF III MINI PEN NEEDLES 31G X 5 MM misc  9/25/21  Yes Provider, MD Nette   bacitracin 500 UNIT/GM ointment Apply 1 application topically to the appropriate area as directed 2 (Two) Times a Day. 9/16/22  Yes Vidal Luevano APRN   Benzethonium Chloride (Dermal Wound Cleanser) 0.13 % liquid Spray on wounds daily as needed  Patient taking differently: Spray on wounds daily as needed on both legs 9/16/22  Yes Vidal Luevano APRN   clopidogrel (Plavix) 75 MG tablet Take 1 tablet by mouth Daily.  Patient taking differently: Take 1 tablet by mouth Daily. Last dose Thursday 9/22/22 9/22/22  Yes Vidal Luevano APRN   Continuous Blood Gluc Sensor (Dexcom G6 Sensor) See Admin  "Instructions. 9/30/22  Yes Nette Jiménez MD   Continuous Glucose Monitor Sup kit 1 kit Continuous. One continuous glucose meter, and supplies needed. Give one month worth of supplies, with 3 refills. ICD-10: e11.9 8/12/22  Yes Vidal Luevano APRN   Elastic Bandages & Supports (ACE Bandage Self-Adhering) misc 1 each Daily. 8/1/21  Yes Damari Cornejo PA-C   Elastic Bandages & Supports (ACE Elastic Bandage 4\") misc 1 Device Daily. One on each leg, bilateral.   #24 = one month supply. 9/30/22  Yes Vidal Luevano APRN   Elastic Bandages & Supports (Medical Compression Socks) misc 1 Device Daily. bilateral compression socks 10/18/22  Yes Vidal Luevano APRN   EPINEPHrine (EPIPEN) 0.3 MG/0.3ML solution auto-injector injection TAKE AS DIRECTED INCASE OF ALLERGIC REACTION. 4/5/22  Yes Nette Jiménez MD   famotidine (PEPCID) 20 MG tablet Take 1 tablet by mouth At Night As Needed. 9/29/22  Yes Nette Jiménez MD   Flovent  MCG/ACT inhaler inhale 2 puffs by mouth twice daily  Patient taking differently: Inhale 1 puff 2 (Two) Times a Day. 7/27/21  Yes Damari Cornejo PA-C   FREESTYLE LITE test strip by Other route 4 (Four) Times a Day. Use to test blood sugar 4 times daily 5/12/22  Yes Nette Jiménez MD   furosemide (LASIX) 40 MG tablet Take 1 tablet by mouth Daily. 9/19/22  Yes Vidal Luevano APRN   gabapentin (NEURONTIN) 300 MG capsule TAKE ONE CAPSULE BY MOUTH THREE TIMES DAILY  Patient taking differently: Take 1 capsule by mouth 4 (Four) Times a Day. 2/22/21  Yes Horace Rios MD   HYDROmorphone (DILAUDID) 1 mg/mL solution Infuse  into a venous catheter Dose Adjusted By Provider As Needed. PER PAIN PUMP   Yes Nette Jiménez MD   Insulin Lispro (ADMELOG SOLOSTAR) 100 UNIT/ML injection pen SLIDING SCALE R/T BS. Takes 2-12 units tid  Patient taking differently: SLIDING SCALE R/T BS. Takes 2-12 units tid not taking for over 2 weeks 3/25/22  Yes Vdial Luevano APRN "   ipratropium-albuterol (DUO-NEB) 0.5-2.5 mg/3 ml nebulizer Inhale 1 ampule. 4/7/21  Yes Provider, MD Nette   Lancets (freestyle) lancets Use as directed. Check sugars tid E11.9 6/7/22  Yes Vidal Luevano APRN   Lantus SoloStar 100 UNIT/ML injection pen Inject 20 Units under the skin into the appropriate area as directed. Pt does not have this med 4/8/22  Yes Provider, MD Nette   lidocaine (Lidoderm) 5 % Place 1 patch on the skin as directed by provider Daily. Remove & Discard patch within 12 hours or as directed by MD  Patient taking differently: Place 1 patch on the skin as directed by provider Daily. Remove & Discard patch within 12 hours or as directed by MD not using 9/9/22  Yes Vidal Luevano APRN   ondansetron ODT (ZOFRAN-ODT) 4 MG disintegrating tablet Place 1 tablet on the tongue 4 (Four) Times a Day As Needed for Nausea or Vomiting. 10/19/22  Yes Vidal Luevano APRN   ProAir  (90 Base) MCG/ACT inhaler Inhale 2 puffs Every 4 (Four) Hours As Needed for Wheezing.  Patient taking differently: Pt does not know which inhaler she uses only uses one 8/29/22  Yes Vidal Luevano APRN   sodium chloride 0.9 % solution  4/11/22  Yes Provider, MD Nette   triamcinolone (KENALOG) 0.1 % ointment Apply  topically to the appropriate area as directed 2 (Two) Times a Day. 9/16/22  Yes Vidal Luevano APRN   Cariprazine HCl (Vraylar) 1.5 MG capsule capsule Take 1 capsule by mouth Daily for 21 days. 10/21/22 11/11/22  Diamond Arcos PA-C        Allergies:   Amoxicillin, Ceclor [cefaclor], Penicillins, Sulfa antibiotics, Valium [diazepam], Ambien [zolpidem], Aspirin, Ativan [lorazepam], Benadryl [diphenhydramine], Biaxin [clarithromycin], Cefazolin, Cephalexin, Cephalosporins, Clindamycin, Compazine [prochlorperazine edisylate], Contrast dye, Doxycycline, Eggs or egg-derived products, Nsaids, Phenergan [promethazine hcl], Promethazine, and Vancomycin    Health Maintenance Due   Topic Date Due  "  • Hepatitis B (1 of 3 - 3-dose series) Never done   • URINE MICROALBUMIN  Never done   • ANNUAL PHYSICAL  Never done   • PAP SMEAR  2017   • MAMMOGRAM  2021   • INFLUENZA VACCINE  Never done         Vital Signs:   /78 (BP Location: Left arm, Patient Position: Sitting, Cuff Size: Adult)   Pulse 83   Temp 98.1 °F (36.7 °C)   Ht 167.6 cm (66\")   Wt 73 kg (161 lb)   SpO2 97%   BMI 25.99 kg/m²     Wt Readings from Last 3 Encounters:   10/24/22 73 kg (161 lb)   10/21/22 73 kg (161 lb)   22 70.3 kg (154 lb 15.7 oz)     BP Readings from Last 3 Encounters:   10/24/22 128/78   10/21/22 110/70   22 118/58              Physical Exam  Vitals reviewed.   Constitutional:       Appearance: Normal appearance. She is well-developed.   HENT:      Head: Normocephalic and atraumatic.   Eyes:      Conjunctiva/sclera: Conjunctivae normal.      Pupils: Pupils are equal, round, and reactive to light.   Cardiovascular:      Rate and Rhythm: Normal rate and regular rhythm.      Pulses: Decreased pulses.      Heart sounds: No murmur heard.    No friction rub. No gallop.   Pulmonary:      Effort: Pulmonary effort is normal.      Breath sounds: Normal breath sounds. No wheezing or rhonchi.   Abdominal:      General: Bowel sounds are normal. There is no distension.      Palpations: Abdomen is soft.      Tenderness: There is no abdominal tenderness.   Musculoskeletal:      Right lower le+ Pitting Edema present.      Left lower le+ Pitting Edema present.   Skin:     General: Skin is warm and dry.      Findings: Erythema and wound present.      Comments: Venous stasis ulcers to bilateral lower extremities with serous drainage   Neurological:      Mental Status: She is alert and oriented to person, place, and time.      Cranial Nerves: No cranial nerve deficit.   Psychiatric:         Mood and Affect: Mood is depressed. Affect is tearful.         Speech: Speech normal.         Behavior: Behavior normal.    "      Thought Content: Thought content normal. Thought content does not include homicidal or suicidal ideation.         Judgment: Judgment normal.                          Result Review :    The following data was reviewed by LUZ Almanza on 10/24/22 at 12:23 EDT:    Common labs    Common Labs 9/5/22 9/5/22 9/13/22 9/13/22 9/16/22    2046 2046 1051 1051    Glucose  127 (A)  147 (A)    BUN  13  11    Creatinine  0.48 (A)  0.55 (A)    Sodium  137  138    Potassium  4.1  4.1    Chloride  98  99    Calcium  9.3  9.3    Albumin  4.00  4.10    Total Bilirubin  0.3  0.2    Alkaline Phosphatase  99  105    AST (SGOT)  11  12    ALT (SGPT)  8  9    WBC 6.39  4.39     Hemoglobin 13.2  13.1     Hematocrit 40.8  41.5     Platelets 187  197     Hemoglobin A1C     6.60 (A)   (A) Abnormal value                                         Assessment and Plan    Diagnoses and all orders for this visit:    1. Venous stasis ulcer of left calf with fat layer exposed without varicose veins (HCC) (Primary)  -     CBC w AUTO Differential  -     Wound Culture - Wound, Leg, Left  -     Benzethonium Chloride (Dermal Wound Cleanser) 0.13 % liquid; Spray on wounds daily as needed on both legs  Dispense: 236 mL; Refill: 1  -     bacitracin 500 UNIT/GM ointment; Apply 1 application topically to the appropriate area as directed 2 (Two) Times a Day.  Dispense: 28 g; Refill: 5    2. Venous stasis ulcer of right calf with fat layer exposed without varicose veins (HCC)  -     CBC w AUTO Differential  -     Wound Culture - Wound, Leg, Left  -     Benzethonium Chloride (Dermal Wound Cleanser) 0.13 % liquid; Spray on wounds daily as needed on both legs  Dispense: 236 mL; Refill: 1  -     bacitracin 500 UNIT/GM ointment; Apply 1 application topically to the appropriate area as directed 2 (Two) Times a Day.  Dispense: 28 g; Refill: 5    3. Joint pain in both hands  -     predniSONE (DELTASONE) 20 MG tablet; Take 1 tablet by mouth Daily for 5  days.  Dispense: 5 tablet; Refill: 0    4. Generalized anxiety disorder  Comments:  Encouraged to call psychiatry to discuss medications and schedule a follow-up visit to address medication concerns.    5. Depression, unspecified depression type  Comments:  Encouraged to call psychiatry to discuss medications and schedule a follow-up visit to address medication concerns.       Wound culture of left leg done in office today.  Patient has several antibiotic allergies.  Appropriate antibiotics will be ordered pending wound culture results.  Patient was encouraged to return to wound care for appropriate evaluation and treatment of venous stasis ulcers of bilateral extremities but patient refuses to return to wound care as she states that it is not effective.  Unna boot treatment was offered to the patient in clinic today but patient refused.      Smoking Cessation:    Isha Llanes  reports that she has been smoking cigarettes. She has a 82.00 pack-year smoking history. She has never used smokeless tobacco.. I have educated her on the risk of diseases from using tobacco products such as cancer, COPD and heart disease.     I advised her to quit and she is not willing to quit.    I spent 3  minutes counseling the patient.            Follow Up   No follow-ups on file.  Patient was given instructions and counseling regarding her condition or for health maintenance advice. Please see specific information pulled into the AVS if appropriate.     Please note that portions of this note were completed with a voice recognition program.

## 2022-10-25 ENCOUNTER — TELEPHONE (OUTPATIENT)
Dept: GASTROENTEROLOGY | Facility: CLINIC | Age: 57
End: 2022-10-25

## 2022-10-25 ENCOUNTER — TELEPHONE (OUTPATIENT)
Dept: PSYCHIATRY | Facility: CLINIC | Age: 57
End: 2022-10-25

## 2022-10-25 ENCOUNTER — PATIENT OUTREACH (OUTPATIENT)
Dept: CASE MANAGEMENT | Facility: OTHER | Age: 57
End: 2022-10-25

## 2022-10-25 DIAGNOSIS — F41.1 GENERALIZED ANXIETY DISORDER: Primary | ICD-10-CM

## 2022-10-25 DIAGNOSIS — F32.A DEPRESSION, UNSPECIFIED DEPRESSION TYPE: ICD-10-CM

## 2022-10-25 PROCEDURE — 99490 CHRNC CARE MGMT STAFF 1ST 20: CPT | Performed by: NURSE PRACTITIONER

## 2022-10-25 PROCEDURE — 99439 CHRNC CARE MGMT STAF EA ADDL: CPT | Performed by: NURSE PRACTITIONER

## 2022-10-25 NOTE — TELEPHONE ENCOUNTER
Patient left voice message at 0:08 today stating found a piece of glass in her stool she would like to know what she should do about this.

## 2022-10-25 NOTE — TELEPHONE ENCOUNTER
Primary care called with an update on the patient's Vraylar.  Patient only took one day, said that it made her stomach hurt and made her have strange thoughts.

## 2022-10-25 NOTE — OUTREACH NOTE
"AMBULATORY CASE MANAGEMENT NOTE    Name and Relationship of Patient/Support Person:  -     Reviewed pt's pcp appt. Also asked for update since psych appt. She said it went ok but that she was given Vraylar and asked not to be on meds \"with serotonin.\" She looked it up online and said she found it did have serotonin. After research, I educated pt that it does not have serotonin in it- it works with serotonin receptors in the brain. She verbalized understanding. She said she only took the med one day because it caused stomach upset and strange thoughts. No SI. Message sent to ESTEBAN WEI and her office in regards to changing this med.    Reminded pt of all upcoming (4) appts next month. Gave her date, times, and addresses. She agreed to arrange transportation.      Education Documentation  No documentation found.        CHARLOTTE GOODMAN  Ambulatory Case Management    10/25/2022, 08:51 EDT  "

## 2022-10-26 NOTE — TELEPHONE ENCOUNTER
Attempted to reach patient, her granddaughter answered and left a message to have her return my call.

## 2022-10-26 NOTE — TELEPHONE ENCOUNTER
Patient: Ese Booth Date: 2017    : 1929 Attending: Niranjan Navarrete MD   88 year old female Admit date: 10/25/2017     Primary Rehabilitation Diagnosis: CVA  Expected Discharge Date: 17 (AM; no reconference needed)  Planned Discharge Destination: Assisted living    Subjective: Denied H/A, N/V/D, CP or SOB  All other systems reviewed and were reported negative per patient.    Reviewed: Medical History and Medications    Intake/Output:  Last Stool Occurrence: 1 (17 1303)    I/O this shift:  In: 320 [P.O.:320]  Out: -     I/O last 3 completed shifts:  In: 1200 [P.O.:1200]  Out: -       Intake/Output Summary (Last 24 hours) at 17 1125  Last data filed at 17 0854   Gross per 24 hour   Intake             1160 ml   Output                0 ml   Net             1160 ml       Physical Exam:   Central Line: No  Watts: No  Vital Last Value 24 Hour Range   Temperature 97.8 °F (36.6 °C) Temp  Min: 97.7 °F (36.5 °C)  Max: 97.8 °F (36.6 °C)   Pulse 74 Pulse  Min: 69  Max: 74   Respiratory 18 Resp  Min: 18  Max: 18   Blood Pressure 140/72 BP  Min: 134/60  Max: 140/72   PulseOximetry 97 % SpO2  Min: 97 %  Max: 98 %     Vital Today Admit   Weight 71.3 kg Weight: 71.5 kg   Height N/A Height: 5' 3\" (160 cm)   BMI N/A BMI (Calculated): 27.98   General Appearance:    Alert, cooperative, no distress, appears stated age  Constitutional:Patient in no distress, well developed and nourished  Head:Normocephalic, without obvious abnormality, atraumatic     Eyes:    PERRL, conjunctiva/corneas clear, EOM's intact, both eyes, no icterus       Teeth:      Missing dentition.  Throat:Lips, mucosa moist  Neck:   Supple, symmetrical, trachea midline, no JVD  Lungs:  Decreased to auscultation bilaterally, respirations unlabored, no wheezes or rhonchi  Heart:    Regular rate and rhythm, S1 and S2 normal, no murmur, rub   or gallop  Abdomen:  Soft, non-tender, bowel sounds active all four quadrants, no  Attempted to reach patient, was not able to leave a message.   hepatosplenomegaly  Extremities:   Extremities normal, atraumatic, no cyanosis or edema  Skin:   Warm and dry, turgor normal, no rashes or lesions at exposed areas  Neurologic:   CNII-XII intact, except [place], right eye laterally deviated. Memory: ST and LT affected. Normal strength, reduced sensation and DTR's throughout    Laboratory Results:  Lab Results   Component Value Date    SODIUM 133 (L) 10/28/2017    POTASSIUM 4.6 10/28/2017    CHLORIDE 97 (L) 10/28/2017    CO2 33 (H) 10/28/2017    CALCIUM 9.4 10/28/2017    BUN 45 (H) 10/28/2017    CREATININE 1.08 (H) 10/28/2017    MG 1.8 10/26/2017    INR 1.1 10/23/2017    PT 11.6 10/23/2017    WBC 10.5 10/26/2017    HCT 33.1 (L) 10/26/2017    HGB 10.8 (L) 10/26/2017     10/26/2017    TSH 2.879 10/23/2017    ALBUMIN 3.1 (L) 10/26/2017    GFRA 53 10/28/2017    GFRNA 46 10/28/2017    GLUCOSE 111 (H) 10/28/2017       Nursing Skin Documentation:   Integumentary Assessment: Exceptions to WDL   Bladder FIM Documentation:      Score: 7-Independent : Pt demonstrates complete independence with bladder management, no accidents     Score: 5-Supervision: Pt requires supervision from helper to don/doff incontinence undergarment            Bowel FIM Documentation:     Score: 7-Independently : Pt demonstrates complete independence with bowel management, no accidents  Score: 6-Modified Independent: Pt uses medication for bowel control  Score: 5-Supervision: Pt requires supervision from helper to don/doff incontinence undergarment            Pain Documentation:   Pain Assessment: Within defined limits   Mobility Documentation:  Supine to Sit: Supervision (Supv), Sit to Supine: Supervision (Supv)  Sit to Stand: Modified Independent, Stand to Sit: Modified Independent, Toilet Transfers: Touching/Steadying Assistance  Gait Assistance: Supervision (Supv), Assistive Device/: 4-wheeled walker, Ambulation Distance (Feet): 150 Feet   ,  ,     , Stair Management Assistance:  Supervision (Supv)   ,     Selfcare Documentation:     Grooming Assistance: Modified independent (hand hygiene)  Bathing Assistance: Supervision  Upper Body Dressing Assistance: Supervision  Lower Body Clothing Assistance: Supervision  Toileting Assistance: Modified independent   Communication/Cognition/Swallowing Documentation:   , Expressive Language: Intact     Simple Problem Solving: Moderate impairment,     , Delayed Memory: Moderate impairment        Scheduled Meds:  • DULoxetine  30 mg Oral Daily   • docusate sodium-sennosides  2 tablet Oral Nightly   • pantoprazole  40 mg Oral Nightly   • atorvastatin  40 mg Oral Nightly   • clopidogrel  75 mg Oral Daily   • insulin regular (human)   Subcutaneous TID AC   • aspirin  81 mg Oral Daily   • calcium carbonate-vitamin D  1 tablet Oral Daily   • carBAMazepine  200 mg Oral 2 times per day   • carvedilol  3.125 mg Oral BID WC   • insulin glargine  6 Units Subcutaneous Nightly       Plan/DC Plan:  Patient has AMB and ADL dysfunction due to CVA  Hypertension: Controlled, continue present management  Functional deficits requiring acute inpatient rehabilitation: Continue intensive inpatient rehab, medical supervision, nursing cares and comprehensive discharge planning.  See team conference reports for ELOS and other specific discharge plans.  Coping following new onset disability:  Benefits greatly from rehabilitation psychology, input of Dr. Ac appreciated.  The patient will be followed by /[facility] hospitalist services for medical management of HTN, DVT prophylaxis, and daily follow up for any medical issues. Input is greatly appreciated  1.  Ischemic CVA:  with MRI evidence early petechial hemorrhage. No current s/s of further neurological events, on asa 81 mg, Plavix 75 mg daily and statin.  Will need Loop Recorder implant as OP.   2.  Expressive aphasia-Ongoing SLP and she is improving+  3.  Essential Hypertension: control appears generally adequate (noted  above) for this point status post acute ischemic CVA with petechial hemorrhage. ACE-I held due to kidney issues.  4.  DM Type 2:  Will continue current Lantus and SSI, ask Internal Med to follow.   5.  Bi-ventricular heart failure:  No current s/s of acute decompensation.  Coreg ongoing.  6.  Mobility and self-care deficits: participating actively with acute care PT & OT with improving activity tolerance (current status noted)    Patient is participating actively with the Rehabilitation Team and is making progress.   Rehab team's documentation reviewed with current status noted above. See team conference reports for specific discharge plans.   I have considered how current medical status and co-morbidities are impacting progress towards goals. Presently, the patient's medical co-morbidities aremaximized and are not inhibiting therapy progress with the medical plan as noted. The patient has continued functional deficits requiring inpatient rehabilitation. Continue intensive rehab, medical supervision , nursing cares and comprehensive discharge planning.

## 2022-10-27 LAB
BACTERIA SPEC AEROBE CULT: ABNORMAL
GRAM STN SPEC: ABNORMAL

## 2022-10-27 RX ORDER — CLINDAMYCIN HYDROCHLORIDE 300 MG/1
300 CAPSULE ORAL 3 TIMES DAILY
Qty: 42 CAPSULE | Refills: 0 | Status: SHIPPED | OUTPATIENT
Start: 2022-10-27 | End: 2022-10-28 | Stop reason: SINTOL

## 2022-10-28 DIAGNOSIS — S81.809D NON-HEALING WOUND OF LOWER EXTREMITY, SUBSEQUENT ENCOUNTER: Primary | ICD-10-CM

## 2022-10-28 NOTE — TELEPHONE ENCOUNTER
abx sent in per pcp.  Talked to Dm at [facility]. Can give a week at a time. They will also do weekly port dressing changes.   Pt agreed to keep phone with her in case of contact from pharmacy.

## 2022-10-28 NOTE — TELEPHONE ENCOUNTER
I called pt to tell her she was started on oral abx for wound infection. However, pt stated Clindamycin causes colitis and severe stomach upset. Educated on taking probiotic. She declined having money for that and was adamant on starting IV abx. Pt said she has a power port and her and her daughter would be willing to administer the med themselves. Are you agreeable to send in an IV form of abx for pt? Please advise.

## 2022-10-28 NOTE — TELEPHONE ENCOUNTER
Per Dm, pt will start abx today, x2 weeks. She asked if pcp wanted to extend it to 4-6 weeks. Advised that pt will be seen in office on 11-9 for f/u and pcp can decide then.

## 2022-10-31 ENCOUNTER — PATIENT OUTREACH (OUTPATIENT)
Dept: CASE MANAGEMENT | Facility: OTHER | Age: 57
End: 2022-10-31

## 2022-10-31 DIAGNOSIS — E11.65 TYPE 2 DIABETES MELLITUS WITH HYPERGLYCEMIA, WITH LONG-TERM CURRENT USE OF INSULIN: ICD-10-CM

## 2022-10-31 DIAGNOSIS — Z79.4 TYPE 2 DIABETES MELLITUS WITH HYPERGLYCEMIA, WITH LONG-TERM CURRENT USE OF INSULIN: ICD-10-CM

## 2022-10-31 DIAGNOSIS — F32.A DEPRESSION, UNSPECIFIED DEPRESSION TYPE: ICD-10-CM

## 2022-10-31 DIAGNOSIS — S81.809D NON-HEALING WOUND OF LOWER EXTREMITY, SUBSEQUENT ENCOUNTER: Primary | ICD-10-CM

## 2022-10-31 DIAGNOSIS — F41.1 GENERALIZED ANXIETY DISORDER: ICD-10-CM

## 2022-10-31 NOTE — OUTREACH NOTE
AMBULATORY CASE MANAGEMENT NOTE    Name and Relationship of Patient/Support Person: Isha Llanes M - Self    Pt called stating [facility] needed to clarify sizes of dressings before they could ship out. called and talked to DME and verified per chart. Pt updated. I also called DME and verified her new address.        Education Documentation  No documentation found.        CHARLOTTE GOODMAN  Ambulatory Case Management    10/31/2022, 13:19 EDT

## 2022-10-31 NOTE — OUTREACH NOTE
[facility] End of Month Documentation    This Chronic Medical Management Care Plan for Isha Llanes, 56 y.o. female, has been monitored and managed; reviewed and a new plan of care implemented for the month of October.  A cumulative time of 114  minutes was spent on this patient record this month, including chart review; phone call with patient; electronic communication with primary care provider; phone call with other providers.    Regarding the patient's problems: has Cancer associated pain; Presence of intrathecal pump; Chronic low back pain with bilateral sciatica; Sacroiliac inflammation (HCC); Chronic pain syndrome; Pelvic pain; Aortic stenosis, severe; Dyspnea on effort; Other pulmonary embolism without acute cor pulmonale (HCC); Nonrheumatic aortic valve stenosis; Hip pain; Anxiety disorder; Allergic rhinitis due to allergen; Arthritis; Asthma; Kidney disease; CHF (congestive heart failure) ([facility]); Chronic obstructive pulmonary disease (HCC); Diabetes mellitus, type 2 (HCC); Depression; GERD (gastroesophageal reflux disease); S/P TAVR (transcatheter aortic valve replacement); Limited mobility; Venous hypertension of both lower extremities; Nicotine dependence; Mitral valve prolapse; Lupus (systemic lupus erythematosus) ([facility]); Long term current use of anticoagulant therapy; History of Hodgkin's lymphoma; Hepatic steatosis; Heart murmur; CAD (coronary artery disease); Non-healing wound of lower extremity, subsequent encounter; MSSA (methicillin susceptible Staphylococcus aureus) infection; Sepsis ([facility]); Change in bowel habits; Nausea; Venous stasis ulcer of right calf (HCC); Venous stasis ulcer of left calf (HCC); Noncompliance; Peripheral vascular disease ([facility]); Obesity with body mass index 30 or greater; Neurologic disorder; Neck pain; Limb pain; Hiatal hernia; Bladder disorder; Frailty; Cellulitis of right foot; Bowel disease; Atrophy of skin; and Breast lump on their problem list., the following items were  "addressed: medical records; referrals to community service providers; medications and any changes can be found within the plan section of the note.  A detailed listing of time spent for chronic care management is tracked within each outreach encounter.  Current medications include:  has a current medication list which includes the following prescription(s): acetaminophen, b-d uf iii mini pen needles, bacitracin, benzethonium chloride, clopidogrel, dexcom g6 sensor, continuous glucose monitor sup, DAPTOmycin (CUBICIN) 500 mg/100 mL NS mbp, ace bandage self-adhering, ace elastic bandage 4\", medical compression socks, epinephrine, famotidine, flovent hfa, freestyle lite, furosemide, gabapentin, hydromorphone, insulin lispro, ipratropium-albuterol, freestyle, lantus solostar, lidocaine, ondansetron odt, proair hfa, sodium chloride, and triamcinolone, and the following Facility-Administered Medications: sterile water. and the patient is reported to be patient is compliant with medication protocol, non compliant with making transportation for appts,  Medications are reported to be non-effective in controlling symptoms and changes have been made to the medication protocol.  Regarding these diagnoses, referrals were made to the following provider(s):  none.  All notes on chart for PCP to review.    The patient was monitored remotely for activity level; medications; blood glucose; mood & behavior, appts, imaging.    The patient's physical needs include:  needs assistance with ADLs; physical healthcare; physician referral.     The patient's mental support needs include:  not applicable    The patient's cognitive support needs include:  increased support, forgetful    The patient's psychosocial support needs include:  need for increased support; coordination of community providers    The patient's functional needs include: physician referral; physical healthcare    The patient's environmental needs include:  no access to " transportation, uses TACK    Care Plan overall comments:  No data recorded    Refer to previous outreach notes for more information on the areas listed above.    Monthly Billing Diagnoses  (S81.809D) Non-healing wound of lower extremity, subsequent encounter    (F41.1) Generalized anxiety disorder    (F32.A) Depression, unspecified depression type    (E11.65,  Z79.4) Type 2 diabetes mellitus with hyperglycemia, with long-term current use of insulin (HCC)    Medications   · Medications have been reconciled    Care Plan progress this month:      Recently Modified Care Plans Updates made since 9/30/2022 12:00 AM    No recently modified care plans.              Instructions   · Patient was provided an electronic copy of care plan  · CCM services were explained and offered and patient has accepted these services.  · Patient has given their written consent to receive CCM services and understands that this includes the authorization of electronic communication of medical information with the other treating providers.  · Patient understands that they may stop CCM services at any time and these changes will be effective at the end of the calendar month and will effectively revocate the agreement of CCM services.  · Patient understands that only one practitioner can furnish and be paid for CCM services during one calendar month.  Patient also understands that there may be co-payment or deductible fees in association with CCM services.  · Patient will continue with at least monthly follow-up calls with the Ambulatory .    CHARLOTTE GOODMAN  Ambulatory Case Management    10/31/2022, 13:21 EDT

## 2022-10-31 NOTE — TELEPHONE ENCOUNTER
Called Dm at [facility] and notified them of additional 2 weeks of therapy. They requested an updated Script be faxed over to show that.   Updated script to show this, please sign and I will get faxed to them.  Thanks  Simran De Leon RN  Ambulatory Case Management    10/31/2022, 10:57 EDT

## 2022-11-07 ENCOUNTER — TELEPHONE (OUTPATIENT)
Dept: FAMILY MEDICINE CLINIC | Facility: CLINIC | Age: 57
End: 2022-11-07

## 2022-11-07 NOTE — TELEPHONE ENCOUNTER
HUB STAFF ATTEMPTED TO FOLLOW WARM TRANSFER PROCESS AND WAS UNSUCCESSFUL.  Caller: Isha Llanes    Relationship to patient: Self    Best call back number: [phone]    Type of visit: FOLLOW UP    Requested date: 11/10/22    If rescheduling, when is the original appointment: 11/09/22     Additional notes:PATIENT STATED SHE IS WANTING TELEHEALTH VISIT, AND NEEDING TO USE OTHER TELEHEALTH OPTION FOR HER 30 MINUTE FOLLOW UP, SHE ALSO STATED SHE HAS HAD A RECENT FLU EXPOSURE.

## 2022-11-08 ENCOUNTER — TELEPHONE (OUTPATIENT)
Dept: FAMILY MEDICINE CLINIC | Facility: CLINIC | Age: 57
End: 2022-11-08

## 2022-11-08 NOTE — TELEPHONE ENCOUNTER
Patient called stating the wounds on her legs are weeping and leaking at night. She stated it does it so much that it leaks down her legs into her shoe. She also stated her left ankle is discolored (yellowish-orange). Patient is asking for IV antibiotics again. Please advise.

## 2022-11-09 ENCOUNTER — PATIENT OUTREACH (OUTPATIENT)
Dept: CASE MANAGEMENT | Facility: OTHER | Age: 57
End: 2022-11-09

## 2022-11-09 ENCOUNTER — TELEPHONE (OUTPATIENT)
Dept: CASE MANAGEMENT | Facility: OTHER | Age: 57
End: 2022-11-09

## 2022-11-09 DIAGNOSIS — Z79.4 TYPE 2 DIABETES MELLITUS WITH HYPERGLYCEMIA, WITH LONG-TERM CURRENT USE OF INSULIN: ICD-10-CM

## 2022-11-09 DIAGNOSIS — F32.A DEPRESSION, UNSPECIFIED DEPRESSION TYPE: Primary | ICD-10-CM

## 2022-11-09 DIAGNOSIS — G89.4 CHRONIC PAIN SYNDROME: ICD-10-CM

## 2022-11-09 DIAGNOSIS — M54.41 CHRONIC LOW BACK PAIN WITH BILATERAL SCIATICA, UNSPECIFIED BACK PAIN LATERALITY: ICD-10-CM

## 2022-11-09 DIAGNOSIS — M54.42 CHRONIC LOW BACK PAIN WITH BILATERAL SCIATICA, UNSPECIFIED BACK PAIN LATERALITY: ICD-10-CM

## 2022-11-09 DIAGNOSIS — Z91.199 NONCOMPLIANCE: ICD-10-CM

## 2022-11-09 DIAGNOSIS — Z85.71 HISTORY OF HODGKIN'S LYMPHOMA: ICD-10-CM

## 2022-11-09 DIAGNOSIS — I35.0 AORTIC STENOSIS, SEVERE: ICD-10-CM

## 2022-11-09 DIAGNOSIS — S81.809D NON-HEALING WOUND OF LOWER EXTREMITY, SUBSEQUENT ENCOUNTER: Primary | ICD-10-CM

## 2022-11-09 DIAGNOSIS — E11.65 TYPE 2 DIABETES MELLITUS WITH HYPERGLYCEMIA, WITH LONG-TERM CURRENT USE OF INSULIN: ICD-10-CM

## 2022-11-09 DIAGNOSIS — G89.29 CHRONIC LOW BACK PAIN WITH BILATERAL SCIATICA, UNSPECIFIED BACK PAIN LATERALITY: ICD-10-CM

## 2022-11-09 DIAGNOSIS — F32.A DEPRESSION, UNSPECIFIED DEPRESSION TYPE: ICD-10-CM

## 2022-11-09 DIAGNOSIS — F41.1 GENERALIZED ANXIETY DISORDER: ICD-10-CM

## 2022-11-09 PROCEDURE — 99490 CHRNC CARE MGMT STAFF 1ST 20: CPT | Performed by: NURSE PRACTITIONER

## 2022-11-09 PROCEDURE — 99439 CHRNC CARE MGMT STAF EA ADDL: CPT | Performed by: NURSE PRACTITIONER

## 2022-11-09 RX ORDER — ONDANSETRON 4 MG/1
4 TABLET, ORALLY DISINTEGRATING ORAL 4 TIMES DAILY PRN
Qty: 30 TABLET | Refills: 0 | OUTPATIENT
Start: 2022-11-09 | End: 2022-11-26

## 2022-11-09 NOTE — TELEPHONE ENCOUNTER
Caller: Isha Llanes    Relationship: Self    Best call back number: [phone]    Requested Prescriptions:   Requested Prescriptions     Pending Prescriptions Disp Refills   • ondansetron ODT (ZOFRAN-ODT) 4 MG disintegrating tablet 30 tablet 0     Sig: Place 1 tablet on the tongue 4 (Four) Times a Day As Needed for Nausea or Vomiting.    MEDROL NOT IN LIST    Pharmacy where request should be sent: [facility] - [place], [place] - [address] [facility] [phone] [facility] [phone] FX     Additional details provided by patient: PATIENT IS OUT AND MEDROL IS NOT IN LIST  Does the patient have less than a 3 day supply:  [x] Yes  [] No    Maximilian Murrieta Rep   11/09/22 13:55 EST

## 2022-11-09 NOTE — OUTREACH NOTE
AMBULATORY CASE MANAGEMENT NOTE    Name and Relationship of Patient/Support Person: Cyndee Llanesline M - Self    Per chart, pt no showed to today's appt, GI appt, and ortho appt.  Gave her contact to ortho and GI to remake appts.  Reminded her of pulm appt later this month.  Remade pcp appt.  Referred back to .  Update on PT referral given to pt from Erlinda NEGRON.  Pt thought there was an order os additional info needed to give [facility] care to continue abx. However, when I called, staff said they have her to continue IV abx until the end of Nov 2022. Nothing needed.      Education Documentation  No documentation found.        CHARLOTTE GOODMAN  Ambulatory Case Management    11/9/2022, 14:49 EST

## 2022-11-17 ENCOUNTER — PATIENT OUTREACH (OUTPATIENT)
Dept: CASE MANAGEMENT | Facility: OTHER | Age: 57
End: 2022-11-17

## 2022-11-17 ENCOUNTER — HOSPITAL ENCOUNTER (OUTPATIENT)
Dept: GENERAL RADIOLOGY | Facility: HOSPITAL | Age: 57
Discharge: HOME | End: 2022-11-17

## 2022-11-17 ENCOUNTER — HOSPITAL ENCOUNTER (OUTPATIENT)
Dept: INFUSION THERAPY | Facility: HOSPITAL | Age: 57
Discharge: HOME | End: 2022-11-17

## 2022-11-17 DIAGNOSIS — Z91.199 NONCOMPLIANCE: ICD-10-CM

## 2022-11-17 DIAGNOSIS — Z45.2 ENCOUNTER FOR CARE RELATED TO VASCULAR ACCESS PORT: Primary | ICD-10-CM

## 2022-11-17 DIAGNOSIS — Z95.828 PORT-A-CATH IN PLACE: ICD-10-CM

## 2022-11-17 DIAGNOSIS — Z95.828 PORT-A-CATH IN PLACE: Primary | ICD-10-CM

## 2022-11-17 DIAGNOSIS — S81.809D NON-HEALING WOUND OF LOWER EXTREMITY, SUBSEQUENT ENCOUNTER: Primary | ICD-10-CM

## 2022-11-17 PROCEDURE — C1751 CATH, INF, PER/CENT/MIDLINE: HCPCS

## 2022-11-17 PROCEDURE — 36593 DECLOT VASCULAR DEVICE: CPT

## 2022-11-17 PROCEDURE — 25010000002 ALTEPLASE 2 MG RECONSTITUTED SOLUTION: Performed by: NURSE PRACTITIONER

## 2022-11-17 PROCEDURE — 71045 X-RAY EXAM CHEST 1 VIEW: CPT

## 2022-11-17 PROCEDURE — 36410 VNPNXR 3YR/> PHY/QHP DX/THER: CPT

## 2022-11-17 RX ORDER — HEPARIN SODIUM (PORCINE) LOCK FLUSH IV SOLN 100 UNIT/ML 100 UNIT/ML
300 SOLUTION INTRAVENOUS ONCE
OUTPATIENT
Start: 2022-11-17

## 2022-11-17 RX ORDER — SODIUM CHLORIDE 0.9 % (FLUSH) 0.9 %
10 SYRINGE (ML) INJECTION AS NEEDED
OUTPATIENT
Start: 2022-11-17

## 2022-11-17 RX ORDER — SODIUM CHLORIDE 0.9 % (FLUSH) 0.9 %
10 SYRINGE (ML) INJECTION AS NEEDED
Status: SHIPPED | OUTPATIENT
Start: 2022-11-17

## 2022-11-17 RX ORDER — HEPARIN SODIUM (PORCINE) LOCK FLUSH IV SOLN 100 UNIT/ML 100 UNIT/ML
500 SOLUTION INTRAVENOUS AS NEEDED
OUTPATIENT
Start: 2022-11-17

## 2022-11-17 RX ORDER — HEPARIN SODIUM (PORCINE) LOCK FLUSH IV SOLN 100 UNIT/ML 100 UNIT/ML
5 SOLUTION INTRAVENOUS AS NEEDED
Status: SHIPPED | OUTPATIENT
Start: 2022-11-17

## 2022-11-17 RX ORDER — SODIUM CHLORIDE 0.9 % (FLUSH) 0.9 %
20 SYRINGE (ML) INJECTION AS NEEDED
OUTPATIENT
Start: 2022-11-17

## 2022-11-17 RX ORDER — SODIUM CHLORIDE 0.9 % (FLUSH) 0.9 %
20 SYRINGE (ML) INJECTION AS NEEDED
Status: SHIPPED | OUTPATIENT
Start: 2022-11-17

## 2022-11-17 RX ORDER — SODIUM CHLORIDE 0.9 % (FLUSH) 0.9 %
10 SYRINGE (ML) INJECTION EVERY 12 HOURS SCHEDULED
Status: SHIPPED | OUTPATIENT
Start: 2022-11-17

## 2022-11-17 RX ADMIN — ALTEPLASE: 2.2 INJECTION, POWDER, LYOPHILIZED, FOR SOLUTION INTRAVENOUS at 17:42

## 2022-11-17 NOTE — OUTREACH NOTE
AMBULATORY CASE MANAGEMENT NOTE    Name and Relationship of Patient/Support Person: Isha Llanes M - Self    CCM Interim Update    Pt's port not flushing. IV abx on hold. coordinated with pcp and pt/daughter. Pt to go to out-pt nursing for port access. If not able to flush, arrange midline or go to ER.        Education Documentation  No documentation found.        CHARLOTTE GOODMAN  Ambulatory Case Management    11/17/2022, 15:32 EST

## 2022-11-18 ENCOUNTER — TELEPHONE (OUTPATIENT)
Dept: FAMILY MEDICINE CLINIC | Facility: CLINIC | Age: 57
End: 2022-11-18

## 2022-11-18 RX ORDER — L.ACIDOPH/B.ANIMALIS/B.LONGUM 15B CELL
1 CAPSULE ORAL DAILY
Qty: 30 CAPSULE | Refills: 2 | Status: SHIPPED | OUTPATIENT
Start: 2022-11-18 | End: 2023-03-14

## 2022-11-18 NOTE — TELEPHONE ENCOUNTER
Caller: Isha Llanes    Relationship: Self    Best call back number: [phone]    What orders are you requesting (i.e. lab or imaging): PORT REMOVED     In what timeframe would the patient need to come in: AS SOON AS POSSIBLE     Additional notes: PORT HAD GONE BAD AND NEEDS TO BE REMOVED,  PATIENT HAS A MIDLINE NOW     PATIENT HAS STATES THAT HER STOMACH IS STILL STAYING UPSET EVEN WITH THE ZOFRAN.      PLEASE ADVISE PATIENT WHEN ORDER IS READY

## 2022-11-18 NOTE — CONSULTS
Patient arrived to outpatient nursing to have Port-a-cath assessed.     The patient reports that the access line to the port had a cap fall off and that blood had backed up in the line. The line has not flushed or aspirated blood since.     I re accessed the port a cath with a new valencia needle and sterile dressing. The line was very difficult to flush and no blood return could be aspirated from the line.     Cathflo was administered as ordered and allowed to dwell for 2 hours. After 2 hours, I attempted to aspirate from the line. Still no blood could be aspirated from the line and the line remained very difficult to flush.     The patient and family did not want to attempt another cathflo tonight.    An order for a midline insertion if cathflo failed to get line to function is active, and I will proceed with the placement of the midline so that patient can get her IV ABX.    The patient and family have been advised that the PAC needs further work-up. They are aware of the risk for infection and the need to either get the Port functional or removed. Patient and family have voiced that they will follow up with the Port-A-Cath troubleshoot.    Ramiro Andujar RN

## 2022-11-18 NOTE — CONSULTS
Midline Line Insertion Procedure Note    Procedure: Insertion of Midline Catheter    Indications:  Long Term IV therapy, Home IV therapy    Procedure Details   Sterile technique was used including antiseptics, cap, gloves, hand hygiene, mask and sheet.    #20G/10CM PowerGlide inserted to the right brachial vein per hospital protocol.   Blood return:  yes    Findings:  Catheter inserted to 10 cm, with 0 cm. Exposed.   Catheter was flushed with 20 cc NS. Patient did tolerate procedure well.    LOT:[id number]  Expiration date:10/2023    Recommendations:  Midline Brochure given to patient with teaching instruction.     Ramiro Andujar, JONATHON

## 2022-11-18 NOTE — TELEPHONE ENCOUNTER
Spoke with patient and let her know Kiko Luevano stated the port can stay in for now while she has the active infection. He will wait about a month before putting her in for the surgical procedure. He also sent her a probiotic to the pharmacy

## 2022-11-21 ENCOUNTER — TELEPHONE (OUTPATIENT)
Dept: CASE MANAGEMENT | Facility: OTHER | Age: 57
End: 2022-11-21

## 2022-11-21 DIAGNOSIS — J44.9 CHRONIC OBSTRUCTIVE PULMONARY DISEASE, UNSPECIFIED COPD TYPE: ICD-10-CM

## 2022-11-21 NOTE — TELEPHONE ENCOUNTER
Hub to read (give pt office numbers please if she calls you, so she can remake appts)    Called pt to help remake appts-  Psych  GI  ortho    No answer, lmtrc.

## 2022-11-22 ENCOUNTER — TELEPHONE (OUTPATIENT)
Dept: CASE MANAGEMENT | Facility: OTHER | Age: 57
End: 2022-11-22

## 2022-11-22 NOTE — TELEPHONE ENCOUNTER
Pt called late yesterday evening stated she needed to remake appts. Per chart, she no showed to pulm. Called and lmtrc.

## 2022-11-23 ENCOUNTER — PATIENT OUTREACH (OUTPATIENT)
Dept: CASE MANAGEMENT | Facility: OTHER | Age: 57
End: 2022-11-23

## 2022-11-23 ENCOUNTER — TELEPHONE (OUTPATIENT)
Dept: PULMONOLOGY | Facility: CLINIC | Age: 57
End: 2022-11-23

## 2022-11-23 DIAGNOSIS — Z79.4 TYPE 2 DIABETES MELLITUS WITH HYPERGLYCEMIA, WITH LONG-TERM CURRENT USE OF INSULIN: ICD-10-CM

## 2022-11-23 DIAGNOSIS — Z91.199 NONCOMPLIANCE: Primary | ICD-10-CM

## 2022-11-23 DIAGNOSIS — E11.65 TYPE 2 DIABETES MELLITUS WITH HYPERGLYCEMIA, WITH LONG-TERM CURRENT USE OF INSULIN: ICD-10-CM

## 2022-11-23 NOTE — OUTREACH NOTE
AMBULATORY CASE MANAGEMENT NOTE    Name and Relationship of Patient/Support Person: Isha Llanes M - Self    CCM Interim Update    Gave pt contact info, again, to remake appts she missed-  Ortho  GI  pulm  Psych    She stated she wrote these down and will call and arrange.    Per chart, home health has not been arranged. She refused to go to wound care. Open to wound care through home health nurses. Messaged scheduling rep at [facility] To look into home health status.        Education Documentation  No documentation found.        CHARLOTTE GOODMAN  Ambulatory Case Management    11/23/2022, 11:45 EST

## 2022-11-25 ENCOUNTER — DOCUMENTATION (OUTPATIENT)
Dept: FAMILY MEDICINE CLINIC | Facility: CLINIC | Age: 57
End: 2022-11-25

## 2022-11-26 ENCOUNTER — HOSPITAL ENCOUNTER (EMERGENCY)
Facility: HOSPITAL | Age: 57
Discharge: HOME | End: 2022-11-26
Attending: EMERGENCY MEDICINE | Admitting: EMERGENCY MEDICINE

## 2022-11-26 VITALS
WEIGHT: 164.68 LBS | BODY MASS INDEX: 26.47 KG/M2 | TEMPERATURE: 98.2 F | OXYGEN SATURATION: 98 % | HEART RATE: 90 BPM | SYSTOLIC BLOOD PRESSURE: 94 MMHG | RESPIRATION RATE: 18 BRPM | HEIGHT: 66 IN | DIASTOLIC BLOOD PRESSURE: 58 MMHG

## 2022-11-26 VITALS
WEIGHT: 164.68 LBS | SYSTOLIC BLOOD PRESSURE: 94 MMHG | DIASTOLIC BLOOD PRESSURE: 69 MMHG | RESPIRATION RATE: 20 BRPM | BODY MASS INDEX: 26.47 KG/M2 | OXYGEN SATURATION: 92 % | HEIGHT: 66 IN | TEMPERATURE: 97.8 F | HEART RATE: 91 BPM

## 2022-11-26 DIAGNOSIS — R11.2 NAUSEA AND VOMITING, UNSPECIFIED VOMITING TYPE: Primary | ICD-10-CM

## 2022-11-26 LAB
027 TOXIN: NORMAL
ALBUMIN SERPL-MCNC: 4.5 G/DL (ref 3.5–5.2)
ALBUMIN/GLOB SERPL: 1.3 G/DL
ALP SERPL-CCNC: 104 U/L (ref 39–117)
ALT SERPL W P-5'-P-CCNC: 12 U/L (ref 1–33)
ANION GAP SERPL CALCULATED.3IONS-SCNC: 12.1 MMOL/L (ref 5–15)
AST SERPL-CCNC: 14 U/L (ref 1–32)
BASOPHILS # BLD AUTO: 0.01 10*3/MM3 (ref 0–0.2)
BASOPHILS NFR BLD AUTO: 0.1 % (ref 0–1.5)
BILIRUB SERPL-MCNC: 0.3 MG/DL (ref 0–1.2)
BUN SERPL-MCNC: 15 MG/DL (ref 6–20)
BUN/CREAT SERPL: 28.8 (ref 7–25)
C DIFF TOX GENS STL QL NAA+PROBE: NEGATIVE
CALCIUM SPEC-SCNC: 9.6 MG/DL (ref 8.6–10.5)
CHLORIDE SERPL-SCNC: 100 MMOL/L (ref 98–107)
CO2 SERPL-SCNC: 25.9 MMOL/L (ref 22–29)
CREAT SERPL-MCNC: 0.52 MG/DL (ref 0.57–1)
DEPRECATED RDW RBC AUTO: 44.4 FL (ref 37–54)
EGFRCR SERPLBLD CKD-EPI 2021: 109.2 ML/MIN/1.73
EOSINOPHIL # BLD AUTO: 0.09 10*3/MM3 (ref 0–0.4)
EOSINOPHIL NFR BLD AUTO: 1.3 % (ref 0.3–6.2)
ERYTHROCYTE [DISTWIDTH] IN BLOOD BY AUTOMATED COUNT: 14.6 % (ref 12.3–15.4)
GLOBULIN UR ELPH-MCNC: 3.6 GM/DL
GLUCOSE BLDC GLUCOMTR-MCNC: 94 MG/DL (ref 70–99)
GLUCOSE SERPL-MCNC: 146 MG/DL (ref 65–99)
HCT VFR BLD AUTO: 47 % (ref 34–46.6)
HGB BLD-MCNC: 14.9 G/DL (ref 12–15.9)
IMM GRANULOCYTES # BLD AUTO: 0.03 10*3/MM3 (ref 0–0.05)
IMM GRANULOCYTES NFR BLD AUTO: 0.4 % (ref 0–0.5)
LYMPHOCYTES # BLD AUTO: 0.65 10*3/MM3 (ref 0.7–3.1)
LYMPHOCYTES NFR BLD AUTO: 9.1 % (ref 19.6–45.3)
MCH RBC QN AUTO: 26.5 PG (ref 26.6–33)
MCHC RBC AUTO-ENTMCNC: 31.7 G/DL (ref 31.5–35.7)
MCV RBC AUTO: 83.5 FL (ref 79–97)
MONOCYTES # BLD AUTO: 0.37 10*3/MM3 (ref 0.1–0.9)
MONOCYTES NFR BLD AUTO: 5.2 % (ref 5–12)
NEUTROPHILS NFR BLD AUTO: 5.99 10*3/MM3 (ref 1.7–7)
NEUTROPHILS NFR BLD AUTO: 83.9 % (ref 42.7–76)
NRBC BLD AUTO-RTO: 0 /100 WBC (ref 0–0.2)
PLATELET # BLD AUTO: 185 10*3/MM3 (ref 140–450)
PMV BLD AUTO: 10.9 FL (ref 6–12)
POTASSIUM SERPL-SCNC: 4.3 MMOL/L (ref 3.5–5.2)
PROT SERPL-MCNC: 8.1 G/DL (ref 6–8.5)
RBC # BLD AUTO: 5.63 10*6/MM3 (ref 3.77–5.28)
SODIUM SERPL-SCNC: 138 MMOL/L (ref 136–145)
WBC NRBC COR # BLD: 7.14 10*3/MM3 (ref 3.4–10.8)

## 2022-11-26 PROCEDURE — 80053 COMPREHEN METABOLIC PANEL: CPT | Performed by: EMERGENCY MEDICINE

## 2022-11-26 PROCEDURE — 25010000002 ONDANSETRON PER 1 MG: Performed by: EMERGENCY MEDICINE

## 2022-11-26 PROCEDURE — 96374 THER/PROPH/DIAG INJ IV PUSH: CPT

## 2022-11-26 PROCEDURE — 93005 ELECTROCARDIOGRAM TRACING: CPT

## 2022-11-26 PROCEDURE — 25010000002 KETOROLAC TROMETHAMINE PER 15 MG: Performed by: EMERGENCY MEDICINE

## 2022-11-26 PROCEDURE — 96375 TX/PRO/DX INJ NEW DRUG ADDON: CPT

## 2022-11-26 PROCEDURE — 87493 C DIFF AMPLIFIED PROBE: CPT | Performed by: EMERGENCY MEDICINE

## 2022-11-26 PROCEDURE — 85025 COMPLETE CBC W/AUTO DIFF WBC: CPT | Performed by: EMERGENCY MEDICINE

## 2022-11-26 PROCEDURE — 99283 EMERGENCY DEPT VISIT LOW MDM: CPT

## 2022-11-26 PROCEDURE — 93010 ELECTROCARDIOGRAM REPORT: CPT | Performed by: INTERNAL MEDICINE

## 2022-11-26 PROCEDURE — 99284 EMERGENCY DEPT VISIT MOD MDM: CPT

## 2022-11-26 PROCEDURE — 93005 ELECTROCARDIOGRAM TRACING: CPT | Performed by: EMERGENCY MEDICINE

## 2022-11-26 PROCEDURE — 25010000002 DICYCLOMINE PER 20 MG: Performed by: EMERGENCY MEDICINE

## 2022-11-26 PROCEDURE — 96372 THER/PROPH/DIAG INJ SC/IM: CPT

## 2022-11-26 PROCEDURE — 82962 GLUCOSE BLOOD TEST: CPT

## 2022-11-26 RX ORDER — DICYCLOMINE HYDROCHLORIDE 10 MG/ML
20 INJECTION INTRAMUSCULAR ONCE
Status: COMPLETED | OUTPATIENT
Start: 2022-11-26 | End: 2022-11-26

## 2022-11-26 RX ORDER — KETOROLAC TROMETHAMINE 30 MG/ML
30 INJECTION, SOLUTION INTRAMUSCULAR; INTRAVENOUS ONCE
Status: COMPLETED | OUTPATIENT
Start: 2022-11-26 | End: 2022-11-26

## 2022-11-26 RX ORDER — FAMOTIDINE 20 MG/1
20 TABLET, FILM COATED ORAL 2 TIMES DAILY
Qty: 30 TABLET | Refills: 0 | Status: SHIPPED | OUTPATIENT
Start: 2022-11-26 | End: 2023-03-14

## 2022-11-26 RX ORDER — DICYCLOMINE HCL 20 MG
20 TABLET ORAL EVERY 6 HOURS
Qty: 20 TABLET | Refills: 0 | Status: SHIPPED | OUTPATIENT
Start: 2022-11-26 | End: 2023-01-12

## 2022-11-26 RX ORDER — FAMOTIDINE 10 MG/ML
20 INJECTION, SOLUTION INTRAVENOUS ONCE
Status: COMPLETED | OUTPATIENT
Start: 2022-11-26 | End: 2022-11-26

## 2022-11-26 RX ORDER — ONDANSETRON 4 MG/1
4 TABLET, ORALLY DISINTEGRATING ORAL EVERY 8 HOURS PRN
Qty: 15 TABLET | Refills: 0 | Status: SHIPPED | OUTPATIENT
Start: 2022-11-26 | End: 2023-02-24 | Stop reason: SDUPTHER

## 2022-11-26 RX ORDER — ONDANSETRON 2 MG/ML
4 INJECTION INTRAMUSCULAR; INTRAVENOUS ONCE
Status: COMPLETED | OUTPATIENT
Start: 2022-11-26 | End: 2022-11-26

## 2022-11-26 RX ADMIN — DICYCLOMINE HYDROCHLORIDE 20 MG: 20 INJECTION, SOLUTION INTRAMUSCULAR at 13:42

## 2022-11-26 RX ADMIN — SODIUM CHLORIDE 1000 ML: 9 INJECTION, SOLUTION INTRAVENOUS at 13:42

## 2022-11-26 RX ADMIN — ONDANSETRON 4 MG: 2 INJECTION INTRAMUSCULAR; INTRAVENOUS at 13:42

## 2022-11-26 RX ADMIN — FAMOTIDINE 20 MG: 10 INJECTION INTRAVENOUS at 13:42

## 2022-11-26 RX ADMIN — KETOROLAC TROMETHAMINE 30 MG: 30 INJECTION, SOLUTION INTRAMUSCULAR; INTRAVENOUS at 13:42

## 2022-11-26 NOTE — PROGRESS NOTES
Patient called about her midline coming out last night patient reports that she already had the tape falling off of it and when she went to sleep she woke up with the laying beside her. Patient reports that she had an infection that she was trying to clear up. Called PCP Kiko Luevano and discussed the situation with him which he reports that at this time her infection should be cleared up since she has been on antibiotics long-term. At this time patient is allowed to leave the midline out she is allowed to be off of antibiotics with follow-up with Kiko Luevano. Patient had phone call message left for her to call the office and follow-up with Raffi Luevano so that he can check on the status of her wound and infection. Patient also was getting ready to have home health care come out to the home again to reinitiate her home wound care.

## 2022-11-27 ENCOUNTER — APPOINTMENT (OUTPATIENT)
Dept: CT IMAGING | Facility: HOSPITAL | Age: 57
End: 2022-11-27

## 2022-11-27 ENCOUNTER — HOSPITAL ENCOUNTER (EMERGENCY)
Facility: HOSPITAL | Age: 57
Discharge: HOME | End: 2022-11-27
Attending: EMERGENCY MEDICINE | Admitting: EMERGENCY MEDICINE

## 2022-11-27 DIAGNOSIS — R10.10 PAIN OF UPPER ABDOMEN: ICD-10-CM

## 2022-11-27 DIAGNOSIS — R19.7 DIARRHEA, UNSPECIFIED TYPE: ICD-10-CM

## 2022-11-27 DIAGNOSIS — R11.2 NAUSEA AND VOMITING, UNSPECIFIED VOMITING TYPE: Primary | ICD-10-CM

## 2022-11-27 LAB
ALBUMIN SERPL-MCNC: 3.8 G/DL (ref 3.5–5.2)
ALBUMIN/GLOB SERPL: 1.3 G/DL
ALP SERPL-CCNC: 86 U/L (ref 39–117)
ALT SERPL W P-5'-P-CCNC: 8 U/L (ref 1–33)
ANION GAP SERPL CALCULATED.3IONS-SCNC: 8.2 MMOL/L (ref 5–15)
AST SERPL-CCNC: 14 U/L (ref 1–32)
BASOPHILS # BLD AUTO: 0.01 10*3/MM3 (ref 0–0.2)
BASOPHILS NFR BLD AUTO: 0.2 % (ref 0–1.5)
BILIRUB SERPL-MCNC: 0.3 MG/DL (ref 0–1.2)
BUN SERPL-MCNC: 20 MG/DL (ref 6–20)
BUN/CREAT SERPL: 35.7 (ref 7–25)
CALCIUM SPEC-SCNC: 8.9 MG/DL (ref 8.6–10.5)
CHLORIDE SERPL-SCNC: 102 MMOL/L (ref 98–107)
CO2 SERPL-SCNC: 29.8 MMOL/L (ref 22–29)
CREAT SERPL-MCNC: 0.56 MG/DL (ref 0.57–1)
DEPRECATED RDW RBC AUTO: 46.5 FL (ref 37–54)
EGFRCR SERPLBLD CKD-EPI 2021: 107.3 ML/MIN/1.73
EOSINOPHIL # BLD AUTO: 0.11 10*3/MM3 (ref 0–0.4)
EOSINOPHIL NFR BLD AUTO: 2.6 % (ref 0.3–6.2)
ERYTHROCYTE [DISTWIDTH] IN BLOOD BY AUTOMATED COUNT: 15.6 % (ref 12.3–15.4)
FLUAV AG NPH QL: NEGATIVE
FLUBV AG NPH QL IA: NEGATIVE
GLOBULIN UR ELPH-MCNC: 3 GM/DL
GLUCOSE BLDC GLUCOMTR-MCNC: 108 MG/DL (ref 70–99)
GLUCOSE SERPL-MCNC: 111 MG/DL (ref 65–99)
HCT VFR BLD AUTO: 42 % (ref 34–46.6)
HGB BLD-MCNC: 13.5 G/DL (ref 12–15.9)
IMM GRANULOCYTES # BLD AUTO: 0.01 10*3/MM3 (ref 0–0.05)
IMM GRANULOCYTES NFR BLD AUTO: 0.2 % (ref 0–0.5)
LIPASE SERPL-CCNC: 17 U/L (ref 13–60)
LYMPHOCYTES # BLD AUTO: 1.25 10*3/MM3 (ref 0.7–3.1)
LYMPHOCYTES NFR BLD AUTO: 29.1 % (ref 19.6–45.3)
MCH RBC QN AUTO: 26.9 PG (ref 26.6–33)
MCHC RBC AUTO-ENTMCNC: 32.1 G/DL (ref 31.5–35.7)
MCV RBC AUTO: 83.7 FL (ref 79–97)
MONOCYTES # BLD AUTO: 0.35 10*3/MM3 (ref 0.1–0.9)
MONOCYTES NFR BLD AUTO: 8.1 % (ref 5–12)
NEUTROPHILS NFR BLD AUTO: 2.57 10*3/MM3 (ref 1.7–7)
NEUTROPHILS NFR BLD AUTO: 59.8 % (ref 42.7–76)
NRBC BLD AUTO-RTO: 0 /100 WBC (ref 0–0.2)
PLATELET # BLD AUTO: 194 10*3/MM3 (ref 140–450)
PMV BLD AUTO: 10.8 FL (ref 6–12)
POTASSIUM SERPL-SCNC: 4 MMOL/L (ref 3.5–5.2)
PROT SERPL-MCNC: 6.8 G/DL (ref 6–8.5)
QT INTERVAL: 395 MS
RBC # BLD AUTO: 5.02 10*6/MM3 (ref 3.77–5.28)
SODIUM SERPL-SCNC: 140 MMOL/L (ref 136–145)
WBC NRBC COR # BLD: 4.3 10*3/MM3 (ref 3.4–10.8)

## 2022-11-27 PROCEDURE — 80053 COMPREHEN METABOLIC PANEL: CPT | Performed by: NURSE PRACTITIONER

## 2022-11-27 PROCEDURE — 96361 HYDRATE IV INFUSION ADD-ON: CPT

## 2022-11-27 PROCEDURE — 25010000002 ONDANSETRON PER 1 MG: Performed by: NURSE PRACTITIONER

## 2022-11-27 PROCEDURE — 87804 INFLUENZA ASSAY W/OPTIC: CPT | Performed by: NURSE PRACTITIONER

## 2022-11-27 PROCEDURE — 74176 CT ABD & PELVIS W/O CONTRAST: CPT

## 2022-11-27 PROCEDURE — 85025 COMPLETE CBC W/AUTO DIFF WBC: CPT | Performed by: NURSE PRACTITIONER

## 2022-11-27 PROCEDURE — 82962 GLUCOSE BLOOD TEST: CPT

## 2022-11-27 PROCEDURE — 96374 THER/PROPH/DIAG INJ IV PUSH: CPT

## 2022-11-27 PROCEDURE — 83690 ASSAY OF LIPASE: CPT | Performed by: NURSE PRACTITIONER

## 2022-11-27 PROCEDURE — 36415 COLL VENOUS BLD VENIPUNCTURE: CPT

## 2022-11-27 RX ORDER — ONDANSETRON 2 MG/ML
4 INJECTION INTRAMUSCULAR; INTRAVENOUS ONCE
Status: COMPLETED | OUTPATIENT
Start: 2022-11-27 | End: 2022-11-27

## 2022-11-27 RX ORDER — DICYCLOMINE HYDROCHLORIDE 10 MG/ML
20 INJECTION INTRAMUSCULAR ONCE
Status: DISCONTINUED | OUTPATIENT
Start: 2022-11-27 | End: 2022-11-27 | Stop reason: HOSPADM

## 2022-11-27 RX ADMIN — ONDANSETRON 4 MG: 2 INJECTION INTRAMUSCULAR; INTRAVENOUS at 05:00

## 2022-11-27 RX ADMIN — SODIUM CHLORIDE 1000 ML: 9 INJECTION, SOLUTION INTRAVENOUS at 05:00

## 2022-11-28 ENCOUNTER — PATIENT OUTREACH (OUTPATIENT)
Dept: CASE MANAGEMENT | Facility: OTHER | Age: 57
End: 2022-11-28

## 2022-11-28 DIAGNOSIS — Z79.4 TYPE 2 DIABETES MELLITUS WITH HYPERGLYCEMIA, WITH LONG-TERM CURRENT USE OF INSULIN: Primary | ICD-10-CM

## 2022-11-28 DIAGNOSIS — F41.1 GENERALIZED ANXIETY DISORDER: ICD-10-CM

## 2022-11-28 DIAGNOSIS — E11.65 TYPE 2 DIABETES MELLITUS WITH HYPERGLYCEMIA, WITH LONG-TERM CURRENT USE OF INSULIN: Primary | ICD-10-CM

## 2022-11-28 DIAGNOSIS — Z91.199 NONCOMPLIANCE: ICD-10-CM

## 2022-11-28 NOTE — OUTREACH NOTE
AMBULATORY CASE MANAGEMENT NOTE    Name and Relationship of Patient/Support Person: Isha Llanes M - Self    CCM Interim Update    Spoke to pcp who said pt's PICC came out. See two ER notes. Pt;s labs stable consider n/v/d. GI meds x3 sent in. Not on abx. Pending cdiff test. Neg for flu.    Pt to remake psych, gi, wound and pulm appts.  HH to come tomorrow. Gave her the info to confirm this. She agreed to call and remake wound care.       Education Documentation  No documentation found.        CHARLOTTE GOODMAN  Ambulatory Case Management    11/28/2022, 10:51 EST

## 2022-11-29 ENCOUNTER — TELEPHONE (OUTPATIENT)
Dept: FAMILY MEDICINE CLINIC | Facility: CLINIC | Age: 57
End: 2022-11-29

## 2022-11-29 ENCOUNTER — PATIENT OUTREACH (OUTPATIENT)
Dept: CASE MANAGEMENT | Facility: OTHER | Age: 57
End: 2022-11-29

## 2022-11-29 DIAGNOSIS — E11.65 TYPE 2 DIABETES MELLITUS WITH HYPERGLYCEMIA, WITH LONG-TERM CURRENT USE OF INSULIN: Primary | ICD-10-CM

## 2022-11-29 DIAGNOSIS — F32.A DEPRESSION, UNSPECIFIED DEPRESSION TYPE: ICD-10-CM

## 2022-11-29 DIAGNOSIS — Z91.199 NONCOMPLIANCE: ICD-10-CM

## 2022-11-29 DIAGNOSIS — Z79.4 TYPE 2 DIABETES MELLITUS WITH HYPERGLYCEMIA, WITH LONG-TERM CURRENT USE OF INSULIN: Primary | ICD-10-CM

## 2022-11-29 DIAGNOSIS — S81.809D NON-HEALING WOUND OF LOWER EXTREMITY, SUBSEQUENT ENCOUNTER: ICD-10-CM

## 2022-11-29 NOTE — TELEPHONE ENCOUNTER
Spoke to Carry who is going to see pt twice a week (nursing). Also, pt is going to cancel her aquatherapy since she cannot have that and home health. Plus, they likely will not take pt since she has open wounds.    Nursing to do aquacel Ag and unna boot on legs

## 2022-11-29 NOTE — OUTREACH NOTE
[facility] End of Month Documentation    This Chronic Medical Management Care Plan for Isha Llanes, 56 y.o. female, has been monitored and managed; reviewed and a new plan of care implemented for the month of November.  A cumulative time of 102  minutes was spent on this patient record this month, including face to face with provider; electronic communication with primary care provider; phone call with patient; phone call with pharmacist; chart review; phone call with other providers.    Regarding the patient's problems: has Cancer associated pain; Presence of intrathecal pump; Chronic low back pain with bilateral sciatica; Sacroiliac inflammation ([facility]); Chronic pain syndrome; Pelvic pain; Aortic stenosis, severe; Dyspnea on effort; Other pulmonary embolism without acute cor pulmonale ([facility]); Nonrheumatic aortic valve stenosis; Hip pain; Anxiety disorder; Allergic rhinitis due to allergen; Arthritis; Asthma; Kidney disease; CHF (congestive heart failure) ([facility]); Chronic obstructive pulmonary disease ([facility]); Diabetes mellitus, type 2 ([facility]); Depression; GERD (gastroesophageal reflux disease); S/P TAVR (transcatheter aortic valve replacement); Limited mobility; Venous hypertension of both lower extremities; Nicotine dependence; Mitral valve prolapse; Lupus (systemic lupus erythematosus) ([facility]); Long term current use of anticoagulant therapy; History of Hodgkin's lymphoma; Hepatic steatosis; Heart murmur; CAD (coronary artery disease); Non-healing wound of lower extremity, subsequent encounter; MSSA (methicillin susceptible Staphylococcus aureus) infection; Sepsis ([facility]); Change in bowel habits; Nausea; Venous stasis ulcer of right calf ([facility]); Venous stasis ulcer of left calf ([facility]); Noncompliance; Peripheral vascular disease ([facility]); Obesity with body mass index 30 or greater; Neurologic disorder; Neck pain; Limb pain; Hiatal hernia; Bladder disorder; Frailty; Cellulitis of right foot; Bowel disease; Atrophy of skin; Breast  "lump; and Encounter for care related to vascular access port on their problem list., the following items were addressed: medical records; medications; changes to medical care; referrals to community service providers and any changes can be found within the plan section of the note.  A detailed listing of time spent for chronic care management is tracked within each outreach encounter.  Current medications include:  has a current medication list which includes the following prescription(s): acetaminophen, b-d uf iii mini pen needles, bacitracin, benzethonium chloride, clopidogrel, dexcom g6 sensor, dexcom g6 transmitter, continuous glucose monitor sup, dicyclomine, ace elastic bandage 4\", medical compression socks, epinephrine, famotidine, flovent hfa, freestyle lite, furosemide, gabapentin, hydromorphone, insulin lispro, ipratropium-albuterol, freestyle, lantus solostar, lidocaine, ondansetron odt, proair hfa, florajen3, sodium chloride, and triamcinolone, and the following Facility-Administered Medications: heparin, sodium chloride, sodium chloride, sodium chloride, and sterile water. and the patient is reported to be patient is noncompliant with medication protocol, non compliant with making transportation for appts,  Medications are reported to be non-effective in controlling symptoms and changes have been made to the medication protocol.  Regarding these diagnoses, referrals were made to the following provider(s):  Home health.  All notes on chart for PCP to review.    The patient was monitored remotely for activity level; pain; mood & behavior; blood glucose; medications, appts, imaging.    The patient's physical needs include:  needs assistance with ADLs; physical healthcare; physician referral.     The patient's mental support needs include:  not applicable    The patient's cognitive support needs include:  increased support, forgetful    The patient's psychosocial support needs include:  need for increased " support; coordination of community providers    The patient's functional needs include: physician referral; physical healthcare    The patient's environmental needs include:  no access to transportation, uses TACK    Care Plan overall comments:  No data recorded    Refer to previous outreach notes for more information on the areas listed above.    Monthly Billing Diagnoses  (E11.65,  Z79.4) Type 2 diabetes mellitus with hyperglycemia, with long-term current use of insulin (HCC)    (Z91.199) Noncompliance    (S81.809D) Non-healing wound of lower extremity, subsequent encounter    (F32.A) Depression, unspecified depression type    Medications   · Medications have been reconciled    Care Plan progress this month:      Recently Modified Care Plans Updates made since 10/29/2022 12:00 AM    No recently modified care plans.              Instructions   · Patient was provided an electronic copy of care plan  · CCM services were explained and offered and patient has accepted these services.  · Patient has given their written consent to receive CCM services and understands that this includes the authorization of electronic communication of medical information with the other treating providers.  · Patient understands that they may stop CCM services at any time and these changes will be effective at the end of the calendar month and will effectively revocate the agreement of CCM services.  · Patient understands that only one practitioner can furnish and be paid for CCM services during one calendar month.  Patient also understands that there may be co-payment or deductible fees in association with CCM services.  · Patient will continue with at least monthly follow-up calls with the Ambulatory .    CHARLOTTE GOODMAN  Ambulatory Case Management    11/29/2022, 13:33 EST

## 2022-11-29 NOTE — TELEPHONE ENCOUNTER
Caller: CARRY    Relationship to patient: Home Health    Best call back number: [phone]    Patient is needing: HOME HEALTH NURSE CALLED. HUB WAS UNABLE TO WARM TRANSFER. CALLER HAS SEVERAL QUESTIONS ABOUT THE PATIENT'S WOUND CARE. PLEASE CALL HER BACK TO DISCUSS.

## 2022-12-05 ENCOUNTER — TELEPHONE (OUTPATIENT)
Dept: FAMILY MEDICINE CLINIC | Facility: CLINIC | Age: 57
End: 2022-12-05

## 2022-12-05 NOTE — TELEPHONE ENCOUNTER
Caller: Isha Llanes    Relationship: Self    Best call back number: [phone]    What is the best time to reach you: ANY    Who are you requesting to speak with (clinical staff, provider,  specific staff member): CLINICAL STAFF    What was the call regarding: PATIENT CALLED STATING THAT SHE WOULD LIKE TO KNOW IF [facility] WOULD REFILL HER INSULIN OR IF SHE WOULD HAVE TO WAIT FOR THE TRANSMITTER RESULTS. PATIENT ALSO STATES THAT IT IS OKAY TO LEAVE A DETAILED MESSAGE SO SHE WOULDN'T HAVE TO CALL BACK.    Do you require a callback: YES

## 2022-12-06 ENCOUNTER — TELEPHONE (OUTPATIENT)
Dept: FAMILY MEDICINE CLINIC | Facility: CLINIC | Age: 57
End: 2022-12-06

## 2022-12-06 ENCOUNTER — TELEPHONE (OUTPATIENT)
Dept: CASE MANAGEMENT | Facility: OTHER | Age: 57
End: 2022-12-06

## 2022-12-06 NOTE — TELEPHONE ENCOUNTER
Called to discuss appts, sugars, and case management handoff. Covering  will be Luciano LOMAS, direct office line is [phone]    I left this on pt's voicemail

## 2022-12-06 NOTE — TELEPHONE ENCOUNTER
Left voicemail for patient that Kiko stated she will have to call Dr. Topete for refills of her diabetic medications.

## 2022-12-06 NOTE — TELEPHONE ENCOUNTER
Left voicemail for patient stating Kiko said she will need to contact Dr. Topete as he is the one who manages her diabetes.

## 2022-12-08 ENCOUNTER — OFFICE VISIT (OUTPATIENT)
Dept: ORTHOPEDIC SURGERY | Facility: CLINIC | Age: 57
End: 2022-12-08

## 2022-12-08 VITALS — OXYGEN SATURATION: 94 % | HEIGHT: 67 IN | HEART RATE: 80 BPM | BODY MASS INDEX: 25.79 KG/M2

## 2022-12-08 DIAGNOSIS — M16.11 PRIMARY OSTEOARTHRITIS OF RIGHT HIP: ICD-10-CM

## 2022-12-08 DIAGNOSIS — M25.551 RIGHT HIP PAIN: Primary | ICD-10-CM

## 2022-12-08 PROCEDURE — 99214 OFFICE O/P EST MOD 30 MIN: CPT | Performed by: ORTHOPAEDIC SURGERY

## 2022-12-08 RX ORDER — METHYLPREDNISOLONE 4 MG/1
TABLET ORAL
Qty: 21 TABLET | Refills: 0 | Status: SHIPPED | OUTPATIENT
Start: 2022-12-08 | End: 2022-12-17

## 2022-12-08 RX ORDER — FLUCONAZOLE 150 MG/1
150 TABLET ORAL ONCE
Qty: 1 TABLET | Refills: 0 | Status: SHIPPED | OUTPATIENT
Start: 2022-12-08 | End: 2022-12-08

## 2022-12-08 NOTE — PROGRESS NOTES
"Chief Complaint  Initial Evaluation of the Right Hip     Subjective      Isha Llanes presents to [facility] for evaluation of the right hip. She reports right hip pain. She ambulates in a motorized wheelchair. She has a heart valve. She seen Dr. Martin in 2020.     Allergies   Allergen Reactions   • Amoxicillin Shortness Of Breath   • Ceclor [Cefaclor] Shortness Of Breath   • Penicillins Shortness Of Breath   • Sulfa Antibiotics Rash   • Valium [Diazepam] Mental Status Change   • Ambien [Zolpidem] Unknown - Low Severity   • Aspirin GI Intolerance   • Ativan [Lorazepam] Unknown - Low Severity   • Benadryl [Diphenhydramine] Unknown - Low Severity   • Biaxin [Clarithromycin] Unknown - Low Severity   • Cefazolin Unknown - Low Severity   • Cephalexin Unknown - Low Severity   • Cephalosporins Unknown - Low Severity   • Clindamycin Unknown - Low Severity   • Compazine [Prochlorperazine Edisylate] Rash   • Contrast Dye Other (See Comments)     Caused pain in her arm   • Doxycycline Rash   • Eggs Or Egg-Derived Products Unknown - Low Severity   • Nsaids Unknown - Low Severity   • Phenergan [Promethazine Hcl] Rash   • Promethazine Unknown - Low Severity   • Vancomycin Itching        Social History     Socioeconomic History   • Marital status:    Tobacco Use   • Smoking status: Every Day     Packs/day: 2.00     Years: 41.00     Pack years: 82.00     Types: Cigarettes   • Smokeless tobacco: Never   Vaping Use   • Vaping Use: Never used   Substance and Sexual Activity   • Alcohol use: No   • Drug use: No   • Sexual activity: Not Currently        Review of Systems     Objective   Vital Signs:   Pulse 80   Ht 170.2 cm (67\")   SpO2 94%   BMI 25.79 kg/m²       Physical Exam  Constitutional:       Appearance: Normal appearance. The patient is well-developed and normal weight.   HENT:      Head: Normocephalic.      Right Ear: Hearing and external ear normal.      Left Ear: Hearing and " external ear normal.      Nose: Nose normal.   Eyes:      Conjunctiva/sclera: Conjunctivae normal.   Cardiovascular:      Rate and Rhythm: Normal rate.   Pulmonary:      Effort: Pulmonary effort is normal.      Breath sounds: No wheezing or rales.   Abdominal:      Palpations: Abdomen is soft.      Tenderness: There is no abdominal tenderness.   Musculoskeletal:      Cervical back: Normal range of motion.   Skin:     Findings: No rash.   Neurological:      Mental Status: The patient is alert and oriented to person, place, and time.   Psychiatric:         Mood and Affect: Mood and affect normal.         Judgment: Judgment normal.       Ortho Exam      Right hip- Hip contracture. Positive EHL, FHL, GS and TA. Sensation intact to all 5 nerves of the foot. Positive pulses. Neurovascularly intact. Limited exam due to contracture.     Procedures    X-Ray Report:  Right hip X-Ray  Indication: Evaluation of Right hip pain  AP/Lateral view(s)  Findings: advanced degenerative changes.   Prior studies available for comparison: no         Imaging Results (Most Recent)     Procedure Component Value Units Date/Time    XR Hip With or Without Pelvis 2 - 3 View Right [[id number]] Resulted: 12/08/22 1504     Updated: 12/08/22 1508           Result Review :       CT Abdomen Pelvis Without Contrast    Result Date: 11/27/2022  Narrative: PROCEDURE: CT ABDOMEN PELVIS WO CONTRAST  COMPARISON: [facility], PET, NM PET/CT SKULL BASE TO MID THIGH, 10/04/2022, 9:28.  [facility], CT, CT ABDOMEN PELVIS WO CONTRAST, 8/30/2022, 10:40.  INDICATIONS: pain/nvd TODAY  TECHNIQUE: CT images were created without intravenous contrast.   PROTOCOL:   Standard imaging protocol performed    RADIATION:   DLP: 557.1 mGy*cm   Automated exposure control was utilized to minimize radiation dose.  FINDINGS:  Within the lung bases is a calcified granuloma in the right lower lobe.  There is hepatic steatosis.  The gallbladder is  surgically absent.  The unenhanced adrenal glands, left kidney, and pancreas are unremarkable.  There is a small nonobstructing right renal stone.  The spleen is enlarged measuring 14.2 cm in craniocaudal dimension.  The stomach appears normal.  The small bowel appears normal in caliber and configuration.  The colon appears normal.  The appendix appears normal.  There is no ascites or loculated collection.  No abnormally enlarged lymph nodes are identified.  The rectum and urinary bladder are unremarkable.  The uterus is surgically absent.  No aggressive osseous lesions are identified.  A spinal stimulator device is noted.  There are severe degenerative changes along both hip joints.      Impression:   1. No acute process identified within abdomen/pelvis. 2. Hepatic steatosis. 3. Nonobstructing right renal stone. 4. Splenomegaly.     MORALES TESFAYE MD       Electronically Signed and Approved By: MORALES TESFAYE MD on 11/27/2022 at 3:51             XR Chest 1 View    Result Date: 11/17/2022  Narrative: PROCEDURE: XR CHEST 1 VW  COMPARISON: [facility], , XR CHEST 1 VW, 9/05/2022, 21:25.  INDICATIONS: verify port a cath alignment sure  FINDINGS:  The lung volumes are low.  There is a left internal jugular MediPort with the tip in the distal SVC.  Granulomatous calcifications are present.. There is no focal consolidation, pneumothorax, or obvious pleural effusion. The pulmonary vasculature appears within normal limits. The cardiac and mediastinal contours are within normal limits. The osseous structures appear intact.      Impression:   No acute cardiopulmonary process.  Left internal jugular MediPort with the tip in the distal SVC.  No pneumothorax.         MELANIE CHOE MD       Electronically Signed and Approved By: MELANIE CHOE MD on 11/17/2022 at 16:58                      Assessment and Plan     Diagnoses and all orders for this visit:    1. Right hip pain (Primary)  -     XR Hip With or Without  Pelvis 2 - 3 View Right    2. Primary osteoarthritis of right hip        Discussed the treatment plan with the patient.  I reviewed the x-rays that were obtained today with the patient. Plan for a referral to a Hip specialist in [place]. Prescription for medrol dose pack and diflucan today.     Educated on risk of smoking. Discussed options for smoking cessation. and Call or return if worsening symptoms.    Follow Up     PRN      Patient was given instructions and counseling regarding her condition or for health maintenance advice. Please see specific information pulled into the AVS if appropriate.     Scribed for Jovany Schneider MD by Abigail Celeste.  12/08/22   14:35 EST    I have personally performed the services described in this document as scribed by the above individual and it is both accurate and complete. Jovany Schneider MD 12/11/22

## 2022-12-09 ENCOUNTER — PATIENT OUTREACH (OUTPATIENT)
Dept: CASE MANAGEMENT | Facility: OTHER | Age: 57
End: 2022-12-09

## 2022-12-09 DIAGNOSIS — Z79.4 TYPE 2 DIABETES MELLITUS WITH HYPERGLYCEMIA, WITH LONG-TERM CURRENT USE OF INSULIN: Primary | ICD-10-CM

## 2022-12-09 DIAGNOSIS — E11.65 TYPE 2 DIABETES MELLITUS WITH HYPERGLYCEMIA, WITH LONG-TERM CURRENT USE OF INSULIN: Primary | ICD-10-CM

## 2022-12-09 DIAGNOSIS — S81.809D NON-HEALING WOUND OF LOWER EXTREMITY, SUBSEQUENT ENCOUNTER: ICD-10-CM

## 2022-12-09 DIAGNOSIS — Z91.199 NONCOMPLIANCE: ICD-10-CM

## 2022-12-09 NOTE — OUTREACH NOTE
AMBULATORY CASE MANAGEMENT NOTE    Name and Relationship of Patient/Support Person: Isha Llanes M - Self    [facility] Interim Update      In depth chart review complete . Patient was transferred from [facility] to my case load . Ihave reviewed labs as well as last outreach from [facility]. I called and left a message to returm my call. . .Called patient and left message to return my call. My  name, reason for call and contact info was left on message.       Education Documentation  No documentation found.        CHANNING NEGRON  Ambulatory Case Management    12/9/2022, 12:11 EST

## 2022-12-12 ENCOUNTER — OFFICE VISIT (OUTPATIENT)
Dept: FAMILY MEDICINE CLINIC | Facility: CLINIC | Age: 57
End: 2022-12-12

## 2022-12-12 VITALS
SYSTOLIC BLOOD PRESSURE: 144 MMHG | OXYGEN SATURATION: 98 % | TEMPERATURE: 97.5 F | HEIGHT: 67 IN | BODY MASS INDEX: 25.79 KG/M2 | HEART RATE: 82 BPM | DIASTOLIC BLOOD PRESSURE: 82 MMHG

## 2022-12-12 DIAGNOSIS — I83.012 VENOUS STASIS ULCER OF RIGHT CALF, UNSPECIFIED ULCER STAGE, UNSPECIFIED WHETHER VARICOSE VEINS PRESENT: Primary | ICD-10-CM

## 2022-12-12 DIAGNOSIS — I83.022 VENOUS STASIS ULCER OF LEFT CALF, UNSPECIFIED ULCER STAGE, UNSPECIFIED WHETHER VARICOSE VEINS PRESENT: ICD-10-CM

## 2022-12-12 DIAGNOSIS — L97.229 VENOUS STASIS ULCER OF LEFT CALF, UNSPECIFIED ULCER STAGE, UNSPECIFIED WHETHER VARICOSE VEINS PRESENT: ICD-10-CM

## 2022-12-12 DIAGNOSIS — L97.219 VENOUS STASIS ULCER OF RIGHT CALF, UNSPECIFIED ULCER STAGE, UNSPECIFIED WHETHER VARICOSE VEINS PRESENT: Primary | ICD-10-CM

## 2022-12-12 PROCEDURE — 99213 OFFICE O/P EST LOW 20 MIN: CPT

## 2022-12-12 RX ORDER — FLUCONAZOLE 150 MG/1
TABLET ORAL
COMMUNITY
Start: 2022-12-08 | End: 2023-01-12

## 2022-12-12 RX ORDER — DAPTOMYCIN 50 MG/ML
INJECTION, POWDER, LYOPHILIZED, FOR SOLUTION INTRAVENOUS
COMMUNITY
Start: 2022-11-18 | End: 2023-01-12

## 2022-12-12 NOTE — PROGRESS NOTES
Chief Complaint  Chief Complaint   Patient presents with   • leg wounds       Subjective      Isha Llanes presents to [facility]  History of Present Illness  Patient presents today with complaints of leg wounds. She had previously been on IV antibiotics via a midline that was accidentally removed. She has a chest port that no longer works and reportedly was supposed to have it removed but hasn't been able to do so with chronic wounds.     She feels that her leg wounds are improving.  She had been seeing wound management but states that this was not an effective treatment and she is no longer interested in seeing wound management.    Patient states that home health is seeing her twice weekly and wrapping her legs. They were due to come to her house today but she said no one showed up.     She reports an allergy to zinc that causes intense itching.     Wound culture was done on 10/24/2022 that grew moderate growth of staphylococcus aureus. She has several antibiotic allergies. She was prescribed Clindamycin at that time but she could not tolerate it so requested IV antibiotics. PCP ordered for her to have a midline placed and she received IV antibiotics for about 2 weeks prior to losing the midline for IV access.  Patient is interested in restarting IV antibiotics but has no current IV access at this time.    Objective     Medical History:  Past Medical History:   Diagnosis Date   • Anxiety     NO CURRENT MEDICATION   • Aortic stenosis, severe    • Arthritis    • Asthma    • Back pain    • Blood clotting disorder (HCC)    • Cancer associated pain    • Chronic low back pain with sciatica    • Chronic pain disorder    • Diabetes mellitus (HCC)     TYPE 2   • Dyspnea on effort    • GERD (gastroesophageal reflux disease)    • H/O lumpectomy    • H/O: hysterectomy    • Hiatal hernia    • History of degenerative disc disease    • History of kidney stones    • History of pulmonary  embolus (PE)    • History of transfusion    • Hodgkin's lymphoma (HCC)    • Immobility    • Liver disease    • Lupus (HCC)    • Mitral valve prolapse    • Panic disorder    • Pelvic pain    • Peripheral neuropathy    • Personal history of DVT (deep vein thrombosis)    • Presence of intrathecal pump    • PTSD (post-traumatic stress disorder)    • Sacroiliac joint disease    • SI (sacroiliac) joint inflammation (HCC)    • Venous insufficiency      Past Surgical History:   Procedure Laterality Date   • BACK SURGERY      SPINAL FUSION    • BLADDER REPAIR     • CARDIAC CATHETERIZATION N/A 3/6/2019    Procedure: Valvuloplasty;  Surgeon: Wayne Johnson MD;  Location: [facility] CATH INVASIVE LOCATION;  Service: Cardiology   • CARDIAC VALVE REPLACEMENT  2021   • CHOLECYSTECTOMY     • COLONOSCOPY N/A 12/29/2021    Procedure: COLONOSCOPY;  Surgeon: Karine Orlando MD;  Location: [facility] ENDOSCOPY;  Service: Gastroenterology;  Laterality: N/A;  POOR PREP   • COLONOSCOPY N/A 4/13/2022    Procedure: COLONOSCOPY WITH POLYPECTOMY;  Surgeon: Karine Orlando MD;  Location: [facility] ENDOSCOPY;  Service: Gastroenterology;  Laterality: N/A;  COLON POLYP, HEMORRHOIDS, POOR PREP   • ENDOSCOPY N/A 12/29/2021    Procedure: ESOPHAGOGASTRODUODENOSCOPY;  Surgeon: Karine Orlando MD;  Location: [facility] ENDOSCOPY;  Service: Gastroenterology;  Laterality: N/A;  ESOPHAGITIS, GASTRITIS, HIATAL HERNIA   • HYSTERECTOMY     • LYMPHADENECTOMY     • PACEMAKER IMPLANTATION     • PAIN PUMP INSERTION/REVISION N/A 10/18/2019    Procedure: PAIN PUMP INSERTION [facility] 10-18-19 [place];  Surgeon: Horace Rios MD;  Location: [facility] OR;  Service: Pain Management   • PAIN PUMP INSERTION/REVISION N/A 11/23/2020    Procedure: pain pump removal;  Surgeon: Horace Rios MD;  Location: [facility] OR;  Service: Pain Management;  Laterality: N/A;   • TONSILLECTOMY     • TUBAL ABDOMINAL LIGATION     • TUMOR REMOVAL        Social  History     Tobacco Use   • Smoking status: Every Day     Packs/day: 2.00     Years: 41.00     Pack years: 82.00     Types: Cigarettes   • Smokeless tobacco: Never   Vaping Use   • Vaping Use: Never used   Substance Use Topics   • Alcohol use: No   • Drug use: No     Family History   Problem Relation Age of Onset   • Anxiety disorder Mother    • Depression Sister    • Bipolar disorder Sister    • Pancreatic cancer Sister    • ADD / ADHD Brother    • Pancreatic cancer Paternal Grandmother    • ADD / ADHD Granddaughter    • ADD / ADHD Grandson    • ADD / ADHD Nephew    • Malig Hyperthermia Neg Hx        Medications:  Prior to Admission medications    Medication Sig Start Date End Date Taking? Authorizing Provider   acetaminophen (TYLENOL) 500 MG tablet Take 1,000-1,500 mg by mouth Every 6 (Six) Hours As Needed for Mild Pain .   Yes Nette Jiménez MD   B-AILYN UF III MINI PEN NEEDLES 31G X 5 MM misc  9/25/21  Yes Nette Jiménez MD   bacitracin 500 UNIT/GM ointment Apply 1 application topically to the appropriate area as directed 2 (Two) Times a Day. 10/24/22  Yes Lizette Henderson APRN   Benzethonium Chloride (Dermal Wound Cleanser) 0.13 % liquid Spray on wounds daily as needed on both legs 10/24/22  Yes Lizette Henderson APRN   clopidogrel (Plavix) 75 MG tablet Take 1 tablet by mouth Daily.  Patient taking differently: Take 75 mg by mouth Daily. Last dose Thursday 9/22/22 9/22/22  Yes Vidal Luevano APRN   Continuous Blood Gluc Sensor (Dexcom G6 Sensor) See Admin Instructions. 9/30/22  Yes Nette Jiménez MD   Continuous Blood Gluc Transmit (Dexcom G6 Transmitter) misc See Admin Instructions. 11/8/22  Yes Nette Jiménez MD   Continuous Glucose Monitor Sup kit 1 kit Continuous. One continuous glucose meter, and supplies needed. Give one month worth of supplies, with 3 refills. ICD-10: e11.9 8/12/22  Yes Vidal Luevano APRN   DAPTOmycin (CUBICIN) 500 MG injection  11/18/22  Yes  "ProviderNette MD   dicyclomine (BENTYL) 20 MG tablet Take 1 tablet by mouth Every 6 (Six) Hours. 11/26/22  Yes Makenzie Xie MD   Elastic Bandages & Supports (ACE Elastic Bandage 4\") misc 1 Device Daily. One on each leg, bilateral.   #24 = one month supply. 9/30/22  Yes Vidal Luevano APRN   Elastic Bandages & Supports (Medical Compression Socks) misc 1 Device Daily. bilateral compression socks 10/18/22  Yes Vidal Luevano APRN   EPINEPHrine (EPIPEN) 0.3 MG/0.3ML solution auto-injector injection TAKE AS DIRECTED INCASE OF ALLERGIC REACTION. 4/5/22  Yes Nette Jiménez MD   famotidine (PEPCID) 20 MG tablet Take 1 tablet by mouth 2 (Two) Times a Day. 11/26/22  Yes Makenzie Xie MD   Flovent  MCG/ACT inhaler inhale 2 puffs by mouth twice daily  Patient taking differently: Inhale 1 puff 2 (Two) Times a Day. 7/27/21  Yes Damari Cornejo PA-C   fluconazole (DIFLUCAN) 150 MG tablet TAKE ONE tablet by MOUTH AS ONE DOSE 12/8/22  Yes ProviderNette MD   FREESTYLE LITE test strip by Other route 4 (Four) Times a Day. Use to test blood sugar 4 times daily 5/12/22  Yes Nette Jiménez MD   furosemide (LASIX) 40 MG tablet Take 1 tablet by mouth Daily. 9/19/22  Yes Vidal Luevano APRN   gabapentin (NEURONTIN) 300 MG capsule TAKE ONE CAPSULE BY MOUTH THREE TIMES DAILY  Patient taking differently: Take 300 mg by mouth 4 (Four) Times a Day. 2/22/21  Yes Horace Rios MD   HYDROmorphone (DILAUDID) 1 mg/mL solution Infuse  into a venous catheter Dose Adjusted By Provider As Needed. PER PAIN PUMP   Yes Nette Jiménez MD   Insulin Lispro (ADMELOG SOLOSTAR) 100 UNIT/ML injection pen SLIDING SCALE R/T BS. Takes 2-12 units tid  Patient taking differently: SLIDING SCALE R/T BS. Takes 2-12 units tid not taking for over 2 weeks 3/25/22  Yes Vidal Luevano APRN   ipratropium-albuterol (DUO-NEB) 0.5-2.5 mg/3 ml nebulizer Inhale 1 ampule. 4/7/21  Yes Provider, MD Nette "   Lancets (freestyle) lancets Use as directed. Check sugars tid E11.9 6/7/22  Yes Vidal Luevano APRN   Lantus SoloStar 100 UNIT/ML injection pen Inject 20 Units under the skin into the appropriate area as directed. Pt does not have this med 4/8/22  Yes Nette Jiménez MD   lidocaine (Lidoderm) 5 % Place 1 patch on the skin as directed by provider Daily. Remove & Discard patch within 12 hours or as directed by MD  Patient taking differently: Place 1 patch on the skin as directed by provider Daily. Remove & Discard patch within 12 hours or as directed by MD not using 9/9/22  Yes Vidal Luevano APRN   methylPREDNISolone (MEDROL) 4 MG dose pack Use as directed by package instructions 12/8/22  Yes Jovany Schneider MD   ondansetron ODT (ZOFRAN-ODT) 4 MG disintegrating tablet Place 1 tablet on the tongue Every 8 (Eight) Hours As Needed for Nausea or Vomiting. 11/26/22  Yes Makenzie Xie MD   ProAir  (90 Base) MCG/ACT inhaler Inhale 2 puffs Every 4 (Four) Hours As Needed for Wheezing. 11/21/22  Yes Vidal Luevano APRN   Probiotic Product (Florajen3) capsule Take 1 capsule by mouth Daily. 11/18/22  Yes Vidal Luevano APRN   sodium chloride (NS) 0.9 % irrigation Irrigate with 250 mL to the affected area as directed by provider Daily. Cleanse wound daily with sterile water. 11/1/22  Yes ProviderNette MD   triamcinolone (KENALOG) 0.1 % ointment Apply  topically to the appropriate area as directed 2 (Two) Times a Day. 9/16/22  Yes Vidal Luevano APRN        Allergies:   Amoxicillin, Ceclor [cefaclor], Penicillins, Sulfa antibiotics, Valium [diazepam], Ambien [zolpidem], Aspirin, Ativan [lorazepam], Benadryl [diphenhydramine], Biaxin [clarithromycin], Cefazolin, Cephalexin, Cephalosporins, Clindamycin, Compazine [prochlorperazine edisylate], Contrast dye, Doxycycline, Eggs or egg-derived products, Nsaids, Phenergan [promethazine hcl], Promethazine, and Vancomycin    Health Maintenance Due  "  Topic Date Due   • Hepatitis B (1 of 3 - 3-dose series) Never done   • URINE MICROALBUMIN  Never done   • DIABETIC FOOT EXAM  Never done   • PAP SMEAR  2017   • DIABETIC EYE EXAM  Never done   • ANNUAL PHYSICAL  Never done   • MAMMOGRAM  2021   • INFLUENZA VACCINE  Never done         Vital Signs:   /82   Pulse 82   Temp 97.5 °F (36.4 °C)   Ht 170.2 cm (67\")   SpO2 98%   BMI 25.79 kg/m²     Wt Readings from Last 3 Encounters:   22 74.7 kg (164 lb 10.9 oz)   22 74.7 kg (164 lb 10.9 oz)   10/24/22 73 kg (161 lb)     BP Readings from Last 3 Encounters:   22 144/82   22 94/58   22 94/69       Physical Exam  Vitals reviewed.   Constitutional:       Appearance: Normal appearance. She is well-developed.   HENT:      Head: Normocephalic and atraumatic.   Eyes:      Conjunctiva/sclera: Conjunctivae normal.      Pupils: Pupils are equal, round, and reactive to light.   Cardiovascular:      Rate and Rhythm: Normal rate and regular rhythm.      Pulses: Decreased pulses.      Heart sounds: No murmur heard.    No friction rub. No gallop.   Pulmonary:      Effort: Pulmonary effort is normal.      Breath sounds: Normal breath sounds. No wheezing or rhonchi.   Abdominal:      General: Bowel sounds are normal. There is no distension.      Palpations: Abdomen is soft.      Tenderness: There is no abdominal tenderness.   Musculoskeletal:      Right lower le+ Pitting Edema present.      Left lower le+ Pitting Edema present.   Skin:     General: Skin is warm and dry.      Findings: Erythema and wound present.      Comments: Venous stasis ulcers to bilateral lower extremities with serous drainage   Neurological:      Mental Status: She is alert and oriented to person, place, and time.      Cranial Nerves: No cranial nerve deficit.   Psychiatric:         Speech: Speech normal.         Behavior: Behavior normal.         Thought Content: Thought content normal. Thought content " does not include homicidal or suicidal ideation.         Judgment: Judgment normal.                  Result Review :    The following data was reviewed by LUZ Almanza on 12/12/22 at 17:11 EST:    Common labs    Common Labs 9/16/22 11/26/22 11/26/22 11/27/22 11/27/22     1301 1301 [id number] [id number]   Glucose   146 (A)  111 (A)   BUN   15  20   Creatinine   0.52 (A)  0.56 (A)   Sodium   138  140   Potassium   4.3  4.0   Chloride   100  102   Calcium   9.6  8.9   Albumin   4.50  3.80   Total Bilirubin   0.3  0.3   Alkaline Phosphatase   104  86   AST (SGOT)   14  14   ALT (SGPT)   12  8   WBC  7.14  4.30    Hemoglobin  14.9  13.5    Hematocrit  47.0 (A)  42.0    Platelets  185  194    Hemoglobin A1C 6.60 (A)       (A) Abnormal value       Comments are available for some flowsheets but are not being displayed.                         Data reviewed: Notes from previous PCP visits and patient out reach with [facility] regarding wound management over the last several months.              Assessment and Plan    Diagnoses and all orders for this visit:    1. Venous stasis ulcer of right calf, unspecified ulcer stage, unspecified whether varicose veins present (HCC) (Primary)    2. Venous stasis ulcer of left calf, unspecified ulcer stage, unspecified whether varicose veins present (HCC)       Bilateral lower extremities wrapped with calamine lotion and Kerlix provided by patient which was obtained from home health services.  Patient was educated on importance of being compliant with wound management in order for proper healing.  Patient verbalized understanding and continues to express desire for restarting IV antibiotics.  Patient has follow-up visit with her PCP next month and we will further discuss possibility of obtaining IV access for additional IV antibiotics if leg wounds do not improve.      Smoking Cessation:    Isha Llanes  reports that she has been smoking cigarettes. She has a 82.00  pack-year smoking history. She has never used smokeless tobacco.. I have educated her on the risk of diseases from using tobacco products such as cancer, COPD and heart disease.     I advised her to quit and she is not willing to quit.    I spent 3  minutes counseling the patient.            Follow Up   No follow-ups on file.  Patient was given instructions and counseling regarding her condition or for health maintenance advice. Please see specific information pulled into the AVS if appropriate.     Please note that portions of this note were completed with a voice recognition program.

## 2022-12-15 ENCOUNTER — TELEPHONE (OUTPATIENT)
Dept: CASE MANAGEMENT | Facility: OTHER | Age: 57
End: 2022-12-15

## 2022-12-15 NOTE — TELEPHONE ENCOUNTER
..Called patient and left message to return my call. My  name, reason for call and contact info was left on message.

## 2022-12-17 ENCOUNTER — DOCUMENTATION (OUTPATIENT)
Dept: FAMILY MEDICINE CLINIC | Facility: CLINIC | Age: 57
End: 2022-12-17

## 2022-12-17 DIAGNOSIS — M16.9 OSTEOARTHRITIS OF HIP, UNSPECIFIED LATERALITY, UNSPECIFIED OSTEOARTHRITIS TYPE: Primary | ICD-10-CM

## 2022-12-17 RX ORDER — PREDNISONE 1 MG/1
TABLET ORAL
Qty: 8 TABLET | Refills: 0 | Status: SHIPPED | OUTPATIENT
Start: 2022-12-17 | End: 2023-01-12

## 2022-12-18 NOTE — PROGRESS NOTES
Patient called after hours about having chronic hip pain. Patient reports that her orthopedic doctor gave her steroid pack which she just completed a couple days ago and now she is having severe arthritis pain all over. She is being referred to a different orthopedic specialist for possible hip surgery and wanted to know if she can get more steroids to help with the pain. I informed her that long-term steroid treatment could hurt her kidneys and thin her bones and that she can get a low-dose for another week but that her and her primary care provider would need to come up with a plan to put her on a long-term anti-inflammatory that can help her with her pain until she sees the specialist which may be a month or 2 from now.

## 2022-12-19 ENCOUNTER — TELEPHONE (OUTPATIENT)
Dept: FAMILY MEDICINE CLINIC | Facility: CLINIC | Age: 57
End: 2022-12-19

## 2022-12-19 NOTE — TELEPHONE ENCOUNTER
Caller: Isha Llanes    Relationship: Self    Best call back number: [phone]    What is the best time to reach you: MORNING OR AFTERNOON    Who are you requesting to speak with (clinical staff, provider,  specific staff member): CLINICAL STAFF     What was the call regarding: PATIENT NEEDS AUTHORIZATION FROM INSURANCE TO START RECEIVING HOME HEALTH CARE AGAIN SHE NEEDS TO SPEAK WITH SOMEONE REGARDING THIS    Do you require a callback: YES

## 2022-12-23 ENCOUNTER — PATIENT OUTREACH (OUTPATIENT)
Dept: CASE MANAGEMENT | Facility: OTHER | Age: 57
End: 2022-12-23

## 2022-12-23 DIAGNOSIS — M54.41 CHRONIC LOW BACK PAIN WITH BILATERAL SCIATICA, UNSPECIFIED BACK PAIN LATERALITY: ICD-10-CM

## 2022-12-23 DIAGNOSIS — E11.65 TYPE 2 DIABETES MELLITUS WITH HYPERGLYCEMIA, WITH LONG-TERM CURRENT USE OF INSULIN: Primary | ICD-10-CM

## 2022-12-23 DIAGNOSIS — G89.29 CHRONIC LOW BACK PAIN WITH BILATERAL SCIATICA, UNSPECIFIED BACK PAIN LATERALITY: ICD-10-CM

## 2022-12-23 DIAGNOSIS — M54.42 CHRONIC LOW BACK PAIN WITH BILATERAL SCIATICA, UNSPECIFIED BACK PAIN LATERALITY: ICD-10-CM

## 2022-12-23 DIAGNOSIS — Z79.4 TYPE 2 DIABETES MELLITUS WITH HYPERGLYCEMIA, WITH LONG-TERM CURRENT USE OF INSULIN: Primary | ICD-10-CM

## 2022-12-23 PROCEDURE — 99439 CHRNC CARE MGMT STAF EA ADDL: CPT | Performed by: NURSE PRACTITIONER

## 2022-12-23 PROCEDURE — 99490 CHRNC CARE MGMT STAFF 1ST 20: CPT | Performed by: NURSE PRACTITIONER

## 2022-12-23 NOTE — OUTREACH NOTE
AMBULATORY CASE MANAGEMENT NOTE    Name and Relationship of Patient/Support Person: Isha Llanes M - Self  Self    CCM Interim Update        I reached out to the patient and introduced myself . I explained I would be helping her with her case Mgt needs she verbalized understanding. Patient stated she had multiple issues at this current time. Stated that she was diabetic , controlled at this time, stated she had chronic back and leg pain currently has pain mgt pump to help with pain. Stated that she has recently been taking steroids which seem to help with the pain and requested a refill. Refill  Was sent to the PCP. Patient has requested that CM makes appt with Dr. Karine Orlando please make apt for as close to 10 AM as possible per patient. Patient is current with [facility] . States they are supposed to be working on helping her with her Dexcom 6 transmitter. I placed a call to [employer] and requested a call back .  Patient currently in a WC states that she has issues sleeping and sleeps in the WC . She stated that it hurts more when she lays down . She has also requested meds to help her sleep. I also sent message to pcp about the med request. Patient stated that blood sugar was stable and at last check it was 138. .Chart reviewed , meds reviewed, patient stated no further needs at this time.All future appts reviewed . All questions were answered and contact information provided . Future outreach scheduled .        Education Documentation  No documentation found.        CHANNING NEGRON  Ambulatory Case Management    12/23/2022, 10:32 EST

## 2022-12-27 ENCOUNTER — TELEPHONE (OUTPATIENT)
Dept: FAMILY MEDICINE CLINIC | Facility: CLINIC | Age: 57
End: 2022-12-27

## 2022-12-27 RX ORDER — INSULIN GLARGINE 100 [IU]/ML
20 INJECTION, SOLUTION SUBCUTANEOUS
Status: CANCELLED | OUTPATIENT
Start: 2022-12-27

## 2022-12-27 RX ORDER — ACETAMINOPHEN 500 MG
TABLET ORAL EVERY 6 HOURS PRN
Status: CANCELLED | OUTPATIENT
Start: 2022-12-27

## 2022-12-27 NOTE — TELEPHONE ENCOUNTER
Caller: Isha Llanes    Relationship: Self    Best call back number: [phone]    Requested Prescriptions:   Requested Prescriptions     Pending Prescriptions Disp Refills   • Lantus SoloStar 100 UNIT/ML injection pen       Sig: Inject 20 Units under the skin into the appropriate area as directed. Pt does not have this med   • acetaminophen (TYLENOL) 500 MG tablet       Sig: Take 2-3 tablets by mouth Every 6 (Six) Hours As Needed for Mild Pain.        Pharmacy where request should be sent: [facility] - [place], [place] - [address] [facility] - [phone] [facility] [phone] FX     Additional details provided by patient:PATIENT IS OUT OF MEDICATION. PLEASE SEND NEW PRESCRIPTIONS INTO PHARMACY ASAP. HER DIABETIC DR NOT IN UNTIL AFTER FIRST OF THE YEAR.    PATIENT WOULD LIKE TO KNOW WHEN HOME HEALTH IS COMING TO TREAT HER LEGS.    Does the patient have less than a 3 day supply:  [x] Yes  [] No    Would you like a call back once the refill request has been completed: [] Yes [] No    If the office needs to give you a call back, can they leave a voicemail: [] Yes [] No    Maximilian Jiménez Rep   12/27/22 16:25 EST

## 2022-12-28 ENCOUNTER — PATIENT OUTREACH (OUTPATIENT)
Dept: CASE MANAGEMENT | Facility: OTHER | Age: 57
End: 2022-12-28

## 2022-12-28 DIAGNOSIS — I87.303 VENOUS HYPERTENSION OF BOTH LOWER EXTREMITIES: ICD-10-CM

## 2022-12-28 DIAGNOSIS — F32.A DEPRESSION, UNSPECIFIED DEPRESSION TYPE: ICD-10-CM

## 2022-12-28 DIAGNOSIS — Z79.4 TYPE 2 DIABETES MELLITUS WITH HYPERGLYCEMIA, WITH LONG-TERM CURRENT USE OF INSULIN: Primary | ICD-10-CM

## 2022-12-28 DIAGNOSIS — E11.65 TYPE 2 DIABETES MELLITUS WITH HYPERGLYCEMIA, WITH LONG-TERM CURRENT USE OF INSULIN: Primary | ICD-10-CM

## 2022-12-28 RX ORDER — ACETAMINOPHEN 500 MG
TABLET ORAL EVERY 6 HOURS PRN
Qty: 60 TABLET | Refills: 3 | Status: SHIPPED | OUTPATIENT
Start: 2022-12-28 | End: 2023-01-03 | Stop reason: SDUPTHER

## 2022-12-28 NOTE — OUTREACH NOTE
AMBULATORY CASE MANAGEMENT NOTE    Name and Relationship of Patient/Support Person:  -     CCM Interim Update    Patient C/o red legs she stated She thought they were infected.  PCP advised stated patient should go to ED. Patient declined ED . Patient to get call back 12-29-22 AM for posable appointment.         Education Documentation  No documentation found.        CHANNING NEGRON  Ambulatory Case Management    12/28/2022, 16:30 EST

## 2022-12-28 NOTE — TELEPHONE ENCOUNTER
Called Dr Tucker Topete office which is who manages her diabetes. Told them she needs a refill for lantus 100 and to inject 20 units under skin. Office stated that they are technically closed but message would be sent to Dr Topete. We will refill the tylenol.

## 2022-12-29 ENCOUNTER — TELEPHONE (OUTPATIENT)
Dept: FAMILY MEDICINE CLINIC | Facility: CLINIC | Age: 57
End: 2022-12-29

## 2022-12-29 ENCOUNTER — TELEPHONE (OUTPATIENT)
Dept: CASE MANAGEMENT | Facility: OTHER | Age: 57
End: 2022-12-29

## 2022-12-29 NOTE — TELEPHONE ENCOUNTER
Appt was made for [id number] @ 330 multiple messages left with patient message included that patient should be seen at ED if conditions persist or get worse.

## 2022-12-29 NOTE — TELEPHONE ENCOUNTER
Called patient and left voicemail that we have an appt for her today at 330 pm with Dr Casey. If she can or cant make it we need to know. HUB PLEASE READ.

## 2022-12-30 ENCOUNTER — TELEPHONE (OUTPATIENT)
Dept: CASE MANAGEMENT | Facility: OTHER | Age: 57
End: 2022-12-30

## 2022-12-30 ENCOUNTER — TELEPHONE (OUTPATIENT)
Dept: FAMILY MEDICINE CLINIC | Facility: CLINIC | Age: 57
End: 2022-12-30
Payer: COMMERCIAL

## 2022-12-30 DIAGNOSIS — E11.65 TYPE 2 DIABETES MELLITUS WITH HYPERGLYCEMIA, WITH LONG-TERM CURRENT USE OF INSULIN: Primary | ICD-10-CM

## 2022-12-30 DIAGNOSIS — Z79.4 TYPE 2 DIABETES MELLITUS WITH HYPERGLYCEMIA, WITH LONG-TERM CURRENT USE OF INSULIN: Primary | ICD-10-CM

## 2022-12-30 DIAGNOSIS — I87.303 VENOUS HYPERTENSION OF BOTH LOWER EXTREMITIES: ICD-10-CM

## 2022-12-30 NOTE — TELEPHONE ENCOUNTER
Patient per home health has been DC'd from home care because of non compliance . Home health to call me back .

## 2022-12-31 ENCOUNTER — PATIENT OUTREACH (OUTPATIENT)
Dept: CASE MANAGEMENT | Facility: OTHER | Age: 57
End: 2022-12-31

## 2022-12-31 DIAGNOSIS — E11.65 TYPE 2 DIABETES MELLITUS WITH HYPERGLYCEMIA, WITH LONG-TERM CURRENT USE OF INSULIN: Primary | ICD-10-CM

## 2022-12-31 DIAGNOSIS — I87.303 VENOUS HYPERTENSION OF BOTH LOWER EXTREMITIES: ICD-10-CM

## 2022-12-31 DIAGNOSIS — Z79.4 TYPE 2 DIABETES MELLITUS WITH HYPERGLYCEMIA, WITH LONG-TERM CURRENT USE OF INSULIN: Primary | ICD-10-CM

## 2022-12-31 NOTE — OUTREACH NOTE
AMBULATORY CASE MANAGEMENT NOTE    Name and Relationship of Patient/Support Person:  -     CCM End of Month Documentation    This Chronic Medical Management Care Plan for Isha Llanes, 57 y.o. female, has been monitored and managed; reviewed and a new plan of care implemented for the month of December.  A cumulative time of 94  minutes was spent on this patient record this month, including chart review; electronic communication with primary care provider; phone call with patient.    Regarding the patient's problems: has Cancer associated pain; Presence of intrathecal pump; Chronic low back pain with bilateral sciatica; Sacroiliac inflammation (HCC); Chronic pain syndrome; Pelvic pain; Aortic stenosis, severe; Dyspnea on effort; Other pulmonary embolism without acute cor pulmonale (HCC); Nonrheumatic aortic valve stenosis; Hip pain; Anxiety disorder; Allergic rhinitis due to allergen; Arthritis; Asthma; Kidney disease; CHF (congestive heart failure) (HCC); Chronic obstructive pulmonary disease (HCC); Diabetes mellitus, type 2 ([facility]); Depression; GERD (gastroesophageal reflux disease); S/P TAVR (transcatheter aortic valve replacement); Limited mobility; Venous hypertension of both lower extremities; Nicotine dependence; Mitral valve prolapse; Lupus (systemic lupus erythematosus) ([facility]); Long term current use of anticoagulant therapy; History of Hodgkin's lymphoma; Hepatic steatosis; Heart murmur; CAD (coronary artery disease); Non-healing wound of lower extremity, subsequent encounter; MSSA (methicillin susceptible Staphylococcus aureus) infection; Sepsis ([facility]); Change in bowel habits; Nausea; Venous stasis ulcer of right calf (HCC); Venous stasis ulcer of left calf (HCC); Noncompliance; Peripheral vascular disease ([facility]); Obesity with body mass index 30 or greater; Neurologic disorder; Neck pain; Limb pain; Hiatal hernia; Bladder disorder; Frailty; Cellulitis of right foot; Bowel disease; Atrophy of skin; Breast  "lump; Encounter for care related to vascular access port; and Primary osteoarthritis of right hip on their problem list., the following items were addressed: medical records; medications; changes to medical care; referrals to community service providers, SW referral with home health and any changes can be found within the plan section of the note.  A detailed listing of time spent for chronic care management is tracked within each outreach encounter.  Current medications include:  has a current medication list which includes the following prescription(s): acetaminophen, b-d uf iii mini pen needles, bacitracin, benzethonium chloride, clopidogrel, dexcom g6 sensor, dexcom g6 transmitter, continuous glucose monitor sup, daptomycin, dicyclomine, ace elastic bandage 4\", medical compression socks, epinephrine, famotidine, flovent hfa, fluconazole, freestyle lite, furosemide, gabapentin, hydromorphone, insulin lispro, ipratropium-albuterol, freestyle, lantus solostar, lidocaine, ondansetron odt, prednisone, proair hfa, florajen3, sodium chloride, and triamcinolone, and the following Facility-Administered Medications: heparin, sodium chloride, sodium chloride, sodium chloride, and sterile water. and the patient is reported to be patient is noncompliant with medication protocol, non compliant with making transportation for appts,  Medications are reported to be non-effective in controlling symptoms and changes have been made to the medication protocol, pt has not applied her CGM.  All notes on chart for PCP to review.    The patient was monitored remotely for activity level; pain; mood & behavior; blood glucose; medications, appts, imaging.    The patient's physical needs include:  needs assistance with ADLs; physical healthcare; physician referral.     The patient's mental support needs include:  not applicable    The patient's cognitive support needs include:  increased support, forgetful    The patient's psychosocial " support needs include:  need for increased support; coordination of community providers    The patient's functional needs include: physician referral; physical healthcare    The patient's environmental needs include:  no access to transportation, uses TACK    Care Plan overall comments:  updated    Refer to previous outreach notes for more information on the areas listed above.    Monthly Billing Diagnoses  (E11.65,  Z79.4) Type 2 diabetes mellitus with hyperglycemia, with long-term current use of insulin ([facility])    (I87.303) Venous hypertension of both lower extremities    Medications   · Medications have been reconciled    Care Plan progress this month:      Recently Modified Care Plans Updates made since 11/30/2022 12:00 AM     General Plan of Care (Adult)         Problem Priority Last Modified     ELS HEALTH PROMOTION OR DISEASE SELF-MANAGEMENT (GENERAL PLAN OF CARE) (ADULT) --  6/17/2022  3:35 PM by Yoli Dao RN              Goal Recent Progress Last Modified     Self-Management Plan Developed --  6/17/2022  3:35 PM by Yoli Dao RN     Evidence-based guidance:   Review biopsychosocial determinants of health screens.   Determine level of modifiable health risk.   Assess level of patient activation, level of readiness, importance and confidence to make changes.   Evoke change talk using open-ended questions, pros and cons, as well as looking forward.   Identify areas where behavior change may lead to improved health.   Partner with patient to develop a robust self-management plan that includes lifestyle factors, such as weight loss, exercise and healthy nutrition, as well as goals specific to disease risks.   Support patient and family/caregiver active participation in decision-making and self-management plan.   Implement additional goals and interventions based on identified risk factors to reduce health risk.   Facilitate advance care planning.   Review need for preventive screening based on  age, sex, family history and health history.    Notes:            Task Due Date Last Modified     Mutually Develop and Foster Achievement of Patient Goals --  12/23/2022 10:28 AM by Odilon Schulte MA     Care Management Activities:      - choices provided  - collaboration with team encouraged  - decision-making supported  - health risks reviewed      Notes:                      · Current Specialty Plan of Care Status in process    Instructions   · Patient was provided an electronic copy of care plan  · CCM services were explained and offered and patient has accepted these services.  · Patient has given their written consent to receive CCM services and understands that this includes the authorization of electronic communication of medical information with the other treating providers.  · Patient understands that they may stop CCM services at any time and these changes will be effective at the end of the calendar month and will effectively revocate the agreement of CCM services.  · Patient understands that only one practitioner can furnish and be paid for CCM services during one calendar month.  Patient also understands that there may be co-payment or deductible fees in association with CCM services.  · Patient will continue with at least monthly follow-up calls with the Ambulatory .    CHANNING NEGRON  Ambulatory Case Management    12/31/2022, 08:08 EST    Education Documentation  No documentation found.        CHANNING NEGRON  Ambulatory Case Management    12/31/2022, 08:08 EST

## 2023-01-03 ENCOUNTER — TELEPHONE (OUTPATIENT)
Dept: FAMILY MEDICINE CLINIC | Facility: CLINIC | Age: 58
End: 2023-01-03
Payer: COMMERCIAL

## 2023-01-03 RX ORDER — PANTOPRAZOLE SODIUM 40 MG/1
40 TABLET, DELAYED RELEASE ORAL DAILY
Qty: 90 TABLET | Refills: 0 | Status: SHIPPED | OUTPATIENT
Start: 2023-01-03 | End: 2023-03-14

## 2023-01-03 RX ORDER — ACETAMINOPHEN 500 MG
TABLET ORAL EVERY 6 HOURS PRN
Qty: 60 TABLET | Refills: 3 | Status: SHIPPED | OUTPATIENT
Start: 2023-01-03

## 2023-01-03 NOTE — TELEPHONE ENCOUNTER
SPOKE WITH PATIENT IN REGARDS TO MISSED APPOINTMENT ON 12/29 @ 3:30PM. PATIENT HAS ALREADY RESCHEDULED SAID APT. ADVISED PATIENT OF NO SHOW POLICY.

## 2023-01-03 NOTE — TELEPHONE ENCOUNTER
A user error has taken place: encounter opened in error, closed for administrative reasons.  
Called and left patient message on voicemail per provider previous message and to call us back if she has any questions or concerns.   
Spoke to patient and she stated that she couldn't get along with the nurses. That the nurses gave her the wrong medicine. She stated that home health fired her but she's saying that she dismissed them first. She stated that she needs a new home health and asking for antibiotics due to legs. Patient had an appointment with Dr Casey yesterday 12/29/22 and was a no show. The appt was for wound care.She wanted you to know that she is going to make a diabetic appt. Please advise.   
(0) independent

## 2023-01-10 ENCOUNTER — PATIENT ROUNDING (BHMG ONLY) (OUTPATIENT)
Dept: WOUND CARE | Facility: HOSPITAL | Age: 58
End: 2023-01-10
Payer: COMMERCIAL

## 2023-01-10 ENCOUNTER — OFFICE VISIT (OUTPATIENT)
Dept: WOUND CARE | Facility: HOSPITAL | Age: 58
End: 2023-01-10
Payer: COMMERCIAL

## 2023-01-10 VITALS
SYSTOLIC BLOOD PRESSURE: 126 MMHG | DIASTOLIC BLOOD PRESSURE: 56 MMHG | TEMPERATURE: 97.8 F | HEART RATE: 79 BPM | RESPIRATION RATE: 18 BRPM

## 2023-01-10 DIAGNOSIS — I83.012 VENOUS STASIS ULCER OF RIGHT CALF LIMITED TO BREAKDOWN OF SKIN, UNSPECIFIED WHETHER VARICOSE VEINS PRESENT: ICD-10-CM

## 2023-01-10 DIAGNOSIS — I87.2 VENOUS STASIS DERMATITIS OF BOTH LOWER EXTREMITIES: ICD-10-CM

## 2023-01-10 DIAGNOSIS — L97.222 VENOUS STASIS ULCER OF LEFT CALF WITH FAT LAYER EXPOSED WITHOUT VARICOSE VEINS: ICD-10-CM

## 2023-01-10 DIAGNOSIS — L97.221 VENOUS STASIS ULCER OF LEFT CALF LIMITED TO BREAKDOWN OF SKIN, UNSPECIFIED WHETHER VARICOSE VEINS PRESENT: Primary | ICD-10-CM

## 2023-01-10 DIAGNOSIS — L97.211 VENOUS STASIS ULCER OF RIGHT CALF LIMITED TO BREAKDOWN OF SKIN, UNSPECIFIED WHETHER VARICOSE VEINS PRESENT: ICD-10-CM

## 2023-01-10 DIAGNOSIS — I87.8 VENOUS STASIS OF BOTH LOWER EXTREMITIES: ICD-10-CM

## 2023-01-10 DIAGNOSIS — I83.022 VENOUS STASIS ULCER OF LEFT CALF LIMITED TO BREAKDOWN OF SKIN, UNSPECIFIED WHETHER VARICOSE VEINS PRESENT: Primary | ICD-10-CM

## 2023-01-10 DIAGNOSIS — I87.2 VENOUS STASIS ULCER OF LEFT CALF WITH FAT LAYER EXPOSED WITHOUT VARICOSE VEINS: ICD-10-CM

## 2023-01-10 DIAGNOSIS — L97.212 VENOUS STASIS ULCER OF RIGHT CALF WITH FAT LAYER EXPOSED WITHOUT VARICOSE VEINS: ICD-10-CM

## 2023-01-10 DIAGNOSIS — I87.2 VENOUS STASIS ULCER OF RIGHT CALF WITH FAT LAYER EXPOSED WITHOUT VARICOSE VEINS: ICD-10-CM

## 2023-01-10 PROCEDURE — 99214 OFFICE O/P EST MOD 30 MIN: CPT | Performed by: EMERGENCY MEDICINE

## 2023-01-10 PROCEDURE — G0463 HOSPITAL OUTPT CLINIC VISIT: HCPCS | Performed by: EMERGENCY MEDICINE

## 2023-01-10 RX ORDER — GINSENG 100 MG
1 CAPSULE ORAL 2 TIMES DAILY
Qty: 28 G | Refills: 1 | Status: SHIPPED | OUTPATIENT
Start: 2023-01-10

## 2023-01-10 RX ORDER — METHYLPREDNISOLONE 4 MG/1
TABLET ORAL 2 TIMES DAILY
COMMUNITY
End: 2023-01-12

## 2023-01-10 NOTE — PROGRESS NOTES
Did we do a good job with your visit?  yES      Do you have your medications and/or supplies?  yES      Do you have a follow-up appointment?  yES      Do you have any suggestions to improve our service?  nO

## 2023-01-12 ENCOUNTER — OFFICE VISIT (OUTPATIENT)
Dept: FAMILY MEDICINE CLINIC | Facility: CLINIC | Age: 58
End: 2023-01-12
Payer: COMMERCIAL

## 2023-01-12 VITALS
OXYGEN SATURATION: 96 % | TEMPERATURE: 98.5 F | BODY MASS INDEX: 25.54 KG/M2 | WEIGHT: 158.9 LBS | DIASTOLIC BLOOD PRESSURE: 76 MMHG | SYSTOLIC BLOOD PRESSURE: 114 MMHG | HEART RATE: 76 BPM | HEIGHT: 66 IN

## 2023-01-12 DIAGNOSIS — L97.229 VENOUS STASIS ULCER OF LEFT CALF, UNSPECIFIED ULCER STAGE, UNSPECIFIED WHETHER VARICOSE VEINS PRESENT: ICD-10-CM

## 2023-01-12 DIAGNOSIS — Z79.4 TYPE 2 DIABETES MELLITUS WITH HYPERGLYCEMIA, WITH LONG-TERM CURRENT USE OF INSULIN: ICD-10-CM

## 2023-01-12 DIAGNOSIS — F17.210 CIGARETTE NICOTINE DEPENDENCE WITHOUT COMPLICATION: ICD-10-CM

## 2023-01-12 DIAGNOSIS — E11.65 TYPE 2 DIABETES MELLITUS WITH HYPERGLYCEMIA, WITH LONG-TERM CURRENT USE OF INSULIN: ICD-10-CM

## 2023-01-12 DIAGNOSIS — E78.2 MIXED HYPERLIPIDEMIA: ICD-10-CM

## 2023-01-12 DIAGNOSIS — J02.9 ACUTE PHARYNGITIS, UNSPECIFIED ETIOLOGY: ICD-10-CM

## 2023-01-12 DIAGNOSIS — Z95.828 PORT-A-CATH IN PLACE: ICD-10-CM

## 2023-01-12 DIAGNOSIS — I83.012 VENOUS STASIS ULCER OF RIGHT CALF, UNSPECIFIED ULCER STAGE, UNSPECIFIED WHETHER VARICOSE VEINS PRESENT: ICD-10-CM

## 2023-01-12 DIAGNOSIS — J44.9 CHRONIC OBSTRUCTIVE PULMONARY DISEASE, UNSPECIFIED COPD TYPE: Primary | ICD-10-CM

## 2023-01-12 DIAGNOSIS — R05.1 ACUTE COUGH: ICD-10-CM

## 2023-01-12 DIAGNOSIS — L97.219 VENOUS STASIS ULCER OF RIGHT CALF, UNSPECIFIED ULCER STAGE, UNSPECIFIED WHETHER VARICOSE VEINS PRESENT: ICD-10-CM

## 2023-01-12 DIAGNOSIS — I83.022 VENOUS STASIS ULCER OF LEFT CALF, UNSPECIFIED ULCER STAGE, UNSPECIFIED WHETHER VARICOSE VEINS PRESENT: ICD-10-CM

## 2023-01-12 LAB
ALBUMIN SERPL-MCNC: 4.7 G/DL (ref 3.5–5.2)
ALBUMIN/GLOB SERPL: 1.5 G/DL
ALP SERPL-CCNC: 92 U/L (ref 39–117)
ALT SERPL W P-5'-P-CCNC: 11 U/L (ref 1–33)
ANION GAP SERPL CALCULATED.3IONS-SCNC: 9 MMOL/L (ref 5–15)
AST SERPL-CCNC: 13 U/L (ref 1–32)
BASOPHILS # BLD AUTO: 0.02 10*3/MM3 (ref 0–0.2)
BASOPHILS NFR BLD AUTO: 0.3 % (ref 0–1.5)
BILIRUB SERPL-MCNC: 0.2 MG/DL (ref 0–1.2)
BUN SERPL-MCNC: 19 MG/DL (ref 6–20)
BUN/CREAT SERPL: 33.9 (ref 7–25)
CALCIUM SPEC-SCNC: 9.4 MG/DL (ref 8.6–10.5)
CHLORIDE SERPL-SCNC: 98 MMOL/L (ref 98–107)
CHOLEST SERPL-MCNC: 142 MG/DL (ref 0–200)
CO2 SERPL-SCNC: 29 MMOL/L (ref 22–29)
CREAT SERPL-MCNC: 0.56 MG/DL (ref 0.57–1)
DEPRECATED RDW RBC AUTO: 44.6 FL (ref 37–54)
EGFRCR SERPLBLD CKD-EPI 2021: 106.6 ML/MIN/1.73
EOSINOPHIL # BLD AUTO: 0.08 10*3/MM3 (ref 0–0.4)
EOSINOPHIL NFR BLD AUTO: 1.3 % (ref 0.3–6.2)
ERYTHROCYTE [DISTWIDTH] IN BLOOD BY AUTOMATED COUNT: 14.9 % (ref 12.3–15.4)
EXPIRATION DATE: NORMAL
GLOBULIN UR ELPH-MCNC: 3.1 GM/DL
GLUCOSE SERPL-MCNC: 186 MG/DL (ref 65–99)
HBA1C MFR BLD: 6.9 % (ref 4.8–5.6)
HCT VFR BLD AUTO: 44.8 % (ref 34–46.6)
HDLC SERPL-MCNC: 30 MG/DL (ref 40–60)
HGB BLD-MCNC: 14 G/DL (ref 12–15.9)
IMM GRANULOCYTES # BLD AUTO: 0.02 10*3/MM3 (ref 0–0.05)
IMM GRANULOCYTES NFR BLD AUTO: 0.3 % (ref 0–0.5)
INTERNAL CONTROL: NORMAL
LDLC SERPL CALC-MCNC: 74 MG/DL (ref 0–100)
LDLC/HDLC SERPL: 2.2 {RATIO}
LYMPHOCYTES # BLD AUTO: 1.2 10*3/MM3 (ref 0.7–3.1)
LYMPHOCYTES NFR BLD AUTO: 18.9 % (ref 19.6–45.3)
Lab: NORMAL
MCH RBC QN AUTO: 26.3 PG (ref 26.6–33)
MCHC RBC AUTO-ENTMCNC: 31.3 G/DL (ref 31.5–35.7)
MCV RBC AUTO: 84.1 FL (ref 79–97)
MONOCYTES # BLD AUTO: 0.43 10*3/MM3 (ref 0.1–0.9)
MONOCYTES NFR BLD AUTO: 6.8 % (ref 5–12)
NEUTROPHILS NFR BLD AUTO: 4.61 10*3/MM3 (ref 1.7–7)
NEUTROPHILS NFR BLD AUTO: 72.4 % (ref 42.7–76)
NRBC BLD AUTO-RTO: 0.2 /100 WBC (ref 0–0.2)
PLATELET # BLD AUTO: 209 10*3/MM3 (ref 140–450)
PMV BLD AUTO: 11.1 FL (ref 6–12)
POTASSIUM SERPL-SCNC: 4.4 MMOL/L (ref 3.5–5.2)
PROT SERPL-MCNC: 7.8 G/DL (ref 6–8.5)
RBC # BLD AUTO: 5.33 10*6/MM3 (ref 3.77–5.28)
S PYO AG THROAT QL: NEGATIVE
SODIUM SERPL-SCNC: 136 MMOL/L (ref 136–145)
TRIGL SERPL-MCNC: 230 MG/DL (ref 0–150)
TSH SERPL DL<=0.05 MIU/L-ACNC: 1.54 UIU/ML (ref 0.27–4.2)
VLDLC SERPL-MCNC: 38 MG/DL (ref 5–40)
WBC NRBC COR # BLD: 6.36 10*3/MM3 (ref 3.4–10.8)

## 2023-01-12 PROCEDURE — 85025 COMPLETE CBC W/AUTO DIFF WBC: CPT | Performed by: NURSE PRACTITIONER

## 2023-01-12 PROCEDURE — 87880 STREP A ASSAY W/OPTIC: CPT | Performed by: NURSE PRACTITIONER

## 2023-01-12 PROCEDURE — 80053 COMPREHEN METABOLIC PANEL: CPT | Performed by: NURSE PRACTITIONER

## 2023-01-12 PROCEDURE — 80061 LIPID PANEL: CPT | Performed by: NURSE PRACTITIONER

## 2023-01-12 PROCEDURE — 83036 HEMOGLOBIN GLYCOSYLATED A1C: CPT | Performed by: NURSE PRACTITIONER

## 2023-01-12 PROCEDURE — 82043 UR ALBUMIN QUANTITATIVE: CPT | Performed by: NURSE PRACTITIONER

## 2023-01-12 PROCEDURE — 99214 OFFICE O/P EST MOD 30 MIN: CPT | Performed by: NURSE PRACTITIONER

## 2023-01-12 PROCEDURE — 84443 ASSAY THYROID STIM HORMONE: CPT | Performed by: NURSE PRACTITIONER

## 2023-01-12 RX ORDER — BROMPHENIRAMINE MALEATE, PSEUDOEPHEDRINE HYDROCHLORIDE, AND DEXTROMETHORPHAN HYDROBROMIDE 2; 30; 10 MG/5ML; MG/5ML; MG/5ML
10 SYRUP ORAL 4 TIMES DAILY PRN
Qty: 118 ML | Refills: 0 | Status: SHIPPED | OUTPATIENT
Start: 2023-01-12 | End: 2023-02-08 | Stop reason: SDUPTHER

## 2023-01-12 RX ORDER — IPRATROPIUM BROMIDE AND ALBUTEROL SULFATE 2.5; .5 MG/3ML; MG/3ML
3 SOLUTION RESPIRATORY (INHALATION)
Qty: 360 ML | Refills: 0 | Status: SHIPPED | OUTPATIENT
Start: 2023-01-12

## 2023-01-12 RX ORDER — INSULIN LISPRO 100 U/ML
INJECTION, SOLUTION SUBCUTANEOUS
Qty: 3 ML | Refills: 3 | Status: SHIPPED | OUTPATIENT
Start: 2023-01-12 | End: 2023-03-14

## 2023-01-12 NOTE — PROGRESS NOTES
"Chief Complaint  Wound Check, Constipation, and Cough    Subjective         Isha Llanes presents to [facility]  HPI   Presents today for follow-up.  She has chronic venous stasis ulcers in her lower extremities.  Currently she has her lower extremities Ace wrapped.  Refuses to see wound care.  She had 2 separate home health agencies that have discharged her due to noncompliance.      Last month she had treatment with daptomycin for her wounds.  During the treatment her the cap came off and back flowed with blood and clotted.  Cathflo failed.  A midline catheter was placed in.  Within a couple days she reports the midline came out.  Antibiotic was discontinued.    She currently lives with her daughter.  She is in a motorized wheelchair.  She has type 2 diabetes which she only takes Humalog insulin.  Has not been checking her blood sugars at home.  She has a Dexcom but has not been able to get it set up.  She sees Dr. Tejada endocrinologist.    She reports over the past week she has been having productive cough.  She has COPD.  She smokes 1 to 2 packs/day.  She reports having a slight sore throat at times swelling in her submandibular lymph nodes.        Social History     Socioeconomic History   • Marital status:    Tobacco Use   • Smoking status: Every Day     Packs/day: 2.00     Years: 41.00     Pack years: 82.00     Types: Cigarettes   • Smokeless tobacco: Never   Vaping Use   • Vaping Use: Never used   Substance and Sexual Activity   • Alcohol use: No   • Drug use: No   • Sexual activity: Not Currently        Objective     Vitals:    01/12/23 1025   BP: 114/76   Pulse: 76   Temp: 98.5 °F (36.9 °C)   SpO2: 96%   Weight: 72.1 kg (158 lb 14.4 oz)   Height: 167.6 cm (66\")        Body mass index is 25.65 kg/m².    Wt Readings from Last 3 Encounters:   01/12/23 72.1 kg (158 lb 14.4 oz)   11/26/22 74.7 kg (164 lb 10.9 oz)   11/26/22 74.7 kg (164 lb 10.9 oz)       BP " Readings from Last 3 Encounters:   01/12/23 114/76   01/10/23 126/56   12/12/22 144/82         Physical Exam  Vitals reviewed.   Constitutional:       Appearance: Normal appearance. She is well-developed.   HENT:      Head: Normocephalic and atraumatic.      Right Ear: External ear normal.      Left Ear: External ear normal.      Mouth/Throat:      Pharynx: No oropharyngeal exudate.   Eyes:      Conjunctiva/sclera: Conjunctivae normal.      Pupils: Pupils are equal, round, and reactive to light.   Cardiovascular:      Rate and Rhythm: Normal rate and regular rhythm.      Heart sounds: No murmur heard.    No friction rub. No gallop.   Pulmonary:      Effort: Pulmonary effort is normal.      Breath sounds: Normal breath sounds. No wheezing or rhonchi.   Abdominal:      General: Bowel sounds are normal. There is no distension.      Palpations: Abdomen is soft.      Tenderness: There is no abdominal tenderness.   Skin:     General: Skin is warm and dry.   Neurological:      Mental Status: She is alert and oriented to person, place, and time.   Psychiatric:         Mood and Affect: Affect normal.          Result Review :   The following data was reviewed by: LUZ Zamora on 01/12/2023:      Procedures    Assessment and Plan   Diagnoses and all orders for this visit:    1. Chronic obstructive pulmonary disease, unspecified COPD type (HCC) (Primary)  -     CBC & Differential  -     Comprehensive Metabolic Panel  -     TSH Rfx On Abnormal To Free T4    2. Port-A-Cath in place    3. Type 2 diabetes mellitus with hyperglycemia, with long-term current use of insulin (HCC)  -     CBC & Differential  -     Comprehensive Metabolic Panel  -     Hemoglobin A1c  -     MicroAlbumin, Urine, Random - Urine, Clean Catch  -     TSH Rfx On Abnormal To Free T4  -     Insulin Lispro (ADMELOG SOLOSTAR) 100 UNIT/ML injection pen; SLIDING SCALE R/T BS. Takes 2-12 units tid  Dispense: 3 mL; Refill: 3    4. Venous stasis ulcer of right  calf, unspecified ulcer stage, unspecified whether varicose veins present (HCC)    5. Venous stasis ulcer of left calf, unspecified ulcer stage, unspecified whether varicose veins present (HCC)    6. Cigarette nicotine dependence without complication    7. Mixed hyperlipidemia  -     Lipid Panel    8. Acute cough  -     brompheniramine-pseudoephedrine-DM 30-2-10 MG/5ML syrup; Take 10 mL by mouth 4 (Four) Times a Day As Needed for Allergies.  Dispense: 118 mL; Refill: 0    9. Acute pharyngitis, unspecified etiology  -     POC Rapid Strep A    Other orders  -     ipratropium-albuterol (DUO-NEB) 0.5-2.5 mg/3 ml nebulizer; Take 3 mL by nebulization 4 (Four) Times a Day.  Dispense: 360 mL; Refill: 0    Strep culture is negative.  Will prescribe duo nebs for her COPD.  Also prescribe Bromfed for her cough.  She is uncontrolled type 2 diabetes.  We will check the following labs.  Encouraged her to follow-up with Dr. Tejada.  Instructed her to come by the office to a week and instruct her how to set up her Dexcom for continued blood sugar monitoring.  Refill her insulin since she is out of medication.  Discussed with patient that she needs to reestablish with wound care for her venous stasis ulcers.        Follow Up   No follow-ups on file.  Patient was given instructions and counseling regarding her condition or for health maintenance advice. Please see specific information pulled into the AVS if appropriate.     Please note that portions of this note were completed with a voice recognition program.

## 2023-01-13 LAB — ALBUMIN UR-MCNC: <1.2 MG/DL

## 2023-01-23 ENCOUNTER — TELEPHONE (OUTPATIENT)
Dept: CASE MANAGEMENT | Facility: OTHER | Age: 58
End: 2023-01-23
Payer: COMMERCIAL

## 2023-01-23 DIAGNOSIS — Z79.4 TYPE 2 DIABETES MELLITUS WITH HYPERGLYCEMIA, WITH LONG-TERM CURRENT USE OF INSULIN: Primary | ICD-10-CM

## 2023-01-23 DIAGNOSIS — F32.A DEPRESSION, UNSPECIFIED DEPRESSION TYPE: ICD-10-CM

## 2023-01-23 DIAGNOSIS — I87.303 VENOUS HYPERTENSION OF BOTH LOWER EXTREMITIES: ICD-10-CM

## 2023-01-23 DIAGNOSIS — Z91.199 NONCOMPLIANCE: ICD-10-CM

## 2023-01-23 DIAGNOSIS — E11.65 TYPE 2 DIABETES MELLITUS WITH HYPERGLYCEMIA, WITH LONG-TERM CURRENT USE OF INSULIN: Primary | ICD-10-CM

## 2023-01-23 NOTE — TELEPHONE ENCOUNTER
.Called patient and left message to return my call. My  name, reason for call and contact info was left on message.

## 2023-01-24 ENCOUNTER — TELEPHONE (OUTPATIENT)
Dept: CASE MANAGEMENT | Facility: OTHER | Age: 58
End: 2023-01-24
Payer: COMMERCIAL

## 2023-01-26 ENCOUNTER — TELEPHONE (OUTPATIENT)
Dept: CASE MANAGEMENT | Facility: OTHER | Age: 58
End: 2023-01-26
Payer: COMMERCIAL

## 2023-01-26 NOTE — TELEPHONE ENCOUNTER
.Called patient and left message to return my call. My  name, reason for call and contact info was left on message. Left message with friend at her primary number

## 2023-01-30 ENCOUNTER — TELEPHONE (OUTPATIENT)
Dept: FAMILY MEDICINE CLINIC | Facility: CLINIC | Age: 58
End: 2023-01-30
Payer: COMMERCIAL

## 2023-01-30 NOTE — TELEPHONE ENCOUNTER
Caller: Isha Llanes    Relationship: Self    Best call back number: [phone]    What medication are you requesting: SOMETHING FOR PINK EYE    What are your current symptoms: STICKY EYES,WATERING EYES     How long have you been experiencing symptoms: ABOUT A WEEK     Have you had these symptoms before:    [x] Yes  [] No    Have you been treated for these symptoms before:   [x] Yes  [] No    If a prescription is needed, what is your preferred pharmacy and phone number: [facility] - [place] - [address] [facility] - [phone]  - [phone] FX

## 2023-01-30 NOTE — TELEPHONE ENCOUNTER
Caller: Isha Llanes    Relationship: Self    Best call back number: [phone]    What medication are you requesting: SORE THROAT MEDICATION    What are your current symptoms: SORE THROAT     How long have you been experiencing symptoms: 2-3 DAYS    Have you had these symptoms before:    [x] Yes  [] No    Have you been treated for these symptoms before:   [x] Yes  [] No    If a prescription is needed, what is your preferred pharmacy and phone number: [facility] - [place] - [address] [facility] - [phone]  - [phone] FX

## 2023-01-31 ENCOUNTER — TELEPHONE (OUTPATIENT)
Dept: FAMILY MEDICINE CLINIC | Facility: CLINIC | Age: 58
End: 2023-01-31
Payer: COMMERCIAL

## 2023-01-31 RX ORDER — GABAPENTIN 300 MG/1
CAPSULE ORAL
Qty: 120 CAPSULE | Refills: 2 | OUTPATIENT
Start: 2023-01-31

## 2023-01-31 NOTE — TELEPHONE ENCOUNTER
Called patient and left message that she would need to make an appt to discuss the Gabapentin since never prescribed by Kiko Luevano. HUB PLEASE READ. Patient needs an appt.

## 2023-01-31 NOTE — TELEPHONE ENCOUNTER
Called patient to get her an appt and got her voicemail . Waiting for her to call back to discuss.HUB PLEASE SCHEDULE APPT.

## 2023-01-31 NOTE — TELEPHONE ENCOUNTER
Called patient and left a message that she would need an appt to discuss medication. HUB PLEASE READ. Patient needs an appt.

## 2023-02-07 ENCOUNTER — TELEPHONE (OUTPATIENT)
Dept: CASE MANAGEMENT | Facility: OTHER | Age: 58
End: 2023-02-07
Payer: COMMERCIAL

## 2023-02-07 DIAGNOSIS — I87.303 VENOUS HYPERTENSION OF BOTH LOWER EXTREMITIES: ICD-10-CM

## 2023-02-07 DIAGNOSIS — E11.65 TYPE 2 DIABETES MELLITUS WITH HYPERGLYCEMIA, WITH LONG-TERM CURRENT USE OF INSULIN: Primary | ICD-10-CM

## 2023-02-07 DIAGNOSIS — F32.A DEPRESSION, UNSPECIFIED DEPRESSION TYPE: ICD-10-CM

## 2023-02-07 DIAGNOSIS — Z91.199 NONCOMPLIANCE: ICD-10-CM

## 2023-02-07 DIAGNOSIS — Z79.4 TYPE 2 DIABETES MELLITUS WITH HYPERGLYCEMIA, WITH LONG-TERM CURRENT USE OF INSULIN: Primary | ICD-10-CM

## 2023-02-08 ENCOUNTER — OFFICE VISIT (OUTPATIENT)
Dept: FAMILY MEDICINE CLINIC | Facility: CLINIC | Age: 58
End: 2023-02-08
Payer: COMMERCIAL

## 2023-02-08 VITALS
HEIGHT: 66 IN | WEIGHT: 158 LBS | TEMPERATURE: 98.9 F | OXYGEN SATURATION: 94 % | SYSTOLIC BLOOD PRESSURE: 110 MMHG | HEART RATE: 93 BPM | DIASTOLIC BLOOD PRESSURE: 68 MMHG | BODY MASS INDEX: 25.39 KG/M2

## 2023-02-08 DIAGNOSIS — R05.1 ACUTE COUGH: Primary | ICD-10-CM

## 2023-02-08 DIAGNOSIS — J44.9 CHRONIC OBSTRUCTIVE PULMONARY DISEASE, UNSPECIFIED COPD TYPE: ICD-10-CM

## 2023-02-08 DIAGNOSIS — J02.9 SORE THROAT: ICD-10-CM

## 2023-02-08 LAB
EXPIRATION DATE: NORMAL
EXPIRATION DATE: NORMAL
FLUAV AG UPPER RESP QL IA.RAPID: NOT DETECTED
FLUBV AG UPPER RESP QL IA.RAPID: NOT DETECTED
INTERNAL CONTROL: NORMAL
INTERNAL CONTROL: NORMAL
Lab: NORMAL
Lab: NORMAL
S PYO AG THROAT QL: NEGATIVE
SARS-COV-2 AG UPPER RESP QL IA.RAPID: NOT DETECTED

## 2023-02-08 PROCEDURE — 87880 STREP A ASSAY W/OPTIC: CPT | Performed by: NURSE PRACTITIONER

## 2023-02-08 PROCEDURE — 87428 SARSCOV & INF VIR A&B AG IA: CPT | Performed by: NURSE PRACTITIONER

## 2023-02-08 PROCEDURE — 3044F HG A1C LEVEL LT 7.0%: CPT | Performed by: NURSE PRACTITIONER

## 2023-02-08 PROCEDURE — 99214 OFFICE O/P EST MOD 30 MIN: CPT | Performed by: NURSE PRACTITIONER

## 2023-02-08 RX ORDER — FLUCONAZOLE 150 MG/1
150 TABLET ORAL ONCE
Qty: 1 TABLET | Refills: 0 | Status: SHIPPED | OUTPATIENT
Start: 2023-02-08 | End: 2023-02-08

## 2023-02-08 RX ORDER — BROMPHENIRAMINE MALEATE, PSEUDOEPHEDRINE HYDROCHLORIDE, AND DEXTROMETHORPHAN HYDROBROMIDE 2; 30; 10 MG/5ML; MG/5ML; MG/5ML
10 SYRUP ORAL 4 TIMES DAILY PRN
Qty: 250 ML | Refills: 2 | Status: SHIPPED | OUTPATIENT
Start: 2023-02-08 | End: 2023-03-14

## 2023-02-08 RX ORDER — ALBUTEROL SULFATE 90 UG/1
2 AEROSOL, METERED RESPIRATORY (INHALATION) EVERY 4 HOURS PRN
Qty: 8.5 G | Refills: 2 | Status: SHIPPED | OUTPATIENT
Start: 2023-02-08 | End: 2023-03-29

## 2023-02-08 RX ORDER — GUAIFENESIN 600 MG/1
1200 TABLET, EXTENDED RELEASE ORAL 2 TIMES DAILY
Qty: 20 TABLET | Refills: 1 | Status: SHIPPED | OUTPATIENT
Start: 2023-02-08 | End: 2023-03-14

## 2023-02-08 RX ORDER — AZITHROMYCIN 250 MG/1
TABLET, FILM COATED ORAL
Qty: 6 TABLET | Refills: 0 | Status: SHIPPED | OUTPATIENT
Start: 2023-02-08 | End: 2023-02-24

## 2023-02-08 NOTE — PROGRESS NOTES
Chief Complaint  Cough and Sore Throat    Subjective        Medical History: has a past medical history of Anxiety, Aortic stenosis, severe, Arthritis, Asthma, Back pain, Blood clotting disorder (HCC), Cancer associated pain, Chronic low back pain with sciatica, Chronic pain disorder, Diabetes mellitus (HCC), Dyspnea on effort, GERD (gastroesophageal reflux disease), H/O lumpectomy, H/O: hysterectomy, Hiatal hernia, History of degenerative disc disease, History of kidney stones, History of pulmonary embolus (PE), History of transfusion, Hodgkin's lymphoma (HCC), Immobility, Liver disease, Lupus (HCC), Mitral valve prolapse, Panic disorder, Pelvic pain, Peripheral neuropathy, Personal history of DVT (deep vein thrombosis), Presence of intrathecal pump, PTSD (post-traumatic stress disorder), Sacroiliac joint disease, SI (sacroiliac) joint inflammation (HCC), and Venous insufficiency.     Surgical History: has a past surgical history that includes Back surgery; Cholecystectomy; Bladder repair; Tumor removal; Lymphadenectomy; Hysterectomy; Tubal ligation; Tonsillectomy; Cardiac catheterization (N/A, 3/6/2019); Pain Pump Insertion/Revision (N/A, 10/18/2019); Pain Pump Insertion/Revision (N/A, 11/23/2020); Pacemaker Implantation; Cardiac valve replacement (2021); Esophagogastroduodenoscopy (N/A, 12/29/2021); Colonoscopy (N/A, 12/29/2021); and Colonoscopy (N/A, 4/13/2022).     Family History: family history includes ADD / ADHD in her brother, granddaughter, grandson, and nephew; Anxiety disorder in her mother; Bipolar disorder in her sister; Depression in her sister; Pancreatic cancer in her paternal grandmother and sister.     Social History: reports that she has been smoking cigarettes. She has a 82.00 pack-year smoking history. She has never used smokeless tobacco. She reports that she does not drink alcohol and does not use drugs.    Isha Llanes presents to [facility]  Cough  This is a new problem. The current episode started in the past 7 days. The problem occurs every few minutes. The cough is productive of sputum. Associated symptoms include nasal congestion, rhinorrhea and a sore throat. Pertinent negatives include no chills or fever. Exacerbated by: smoking. Risk factors for lung disease include smoking/tobacco exposure. She has tried nothing for the symptoms. Her past medical history is significant for COPD.   Sore Throat   This is a new problem. The current episode started in the past 7 days. The problem has been waxing and waning. Associated symptoms include coughing. She has had exposure to strep. She has tried nothing for the symptoms. The treatment provided no relief.   Patient of Kiko Luevano nurse practitioner  Blood sugar in office 125  Objective   Vital Signs:   /68   Pulse 93   Temp 98.9 °F (37.2 °C)   Ht 167.6 cm (66\")   Wt 71.7 kg (158 lb)   SpO2 94%   BMI 25.50 kg/m²       Wt Readings from Last 3 Encounters:   02/08/23 71.7 kg (158 lb)   01/12/23 72.1 kg (158 lb 14.4 oz)   11/26/22 74.7 kg (164 lb 10.9 oz)        BP Readings from Last 3 Encounters:   02/08/23 110/68   01/12/23 114/76   01/10/23 126/56        BMI is >= 25 and <30. (Overweight) The following options were offered after discussion;: weight loss educational material (shared in after visit summary)       Physical Exam  Vitals reviewed.   Constitutional:       General: She is not in acute distress.     Appearance: Normal appearance. She is well-developed. She is ill-appearing (chronically ill appearing).   HENT:      Head: Normocephalic and atraumatic.      Left Ear: Tympanic membrane normal.      Ears:      Comments: Right ear TM bulging, no erythema or effusion     Nose: Congestion and rhinorrhea present.      Mouth/Throat:      Mouth: Mucous membranes are moist.      Pharynx: No oropharyngeal exudate.      Comments: White postnasal drip  Eyes:      Conjunctiva/sclera: " Conjunctivae normal.      Pupils: Pupils are equal, round, and reactive to light.   Cardiovascular:      Rate and Rhythm: Normal rate and regular rhythm.      Heart sounds: No murmur heard.  Pulmonary:      Effort: Pulmonary effort is normal.      Breath sounds: Normal breath sounds. No wheezing or rhonchi.   Skin:     General: Skin is warm and dry.   Neurological:      Mental Status: She is alert and oriented to person, place, and time.      Gait: Gait abnormal (wheel chair dependent).   Psychiatric:         Mood and Affect: Mood and affect normal.         Behavior: Behavior normal.         Thought Content: Thought content normal.         Judgment: Judgment normal.        Result Review :  {The following data was reviewed by LUZ Azul on 02/08/2023.  Common labs    Common Labs 11/26/22 11/26/22 11/27/22 11/27/22 1/12/23 1/12/23 1/12/23 1/12/23 1/12/23    1301 1301 0354 0456 1148 1148 1148 1148 1148   Glucose  146 (A)  111 (A)   186 (A)     BUN  15  20   19     Creatinine  0.52 (A)  0.56 (A)   0.56 (A)     Sodium  138  140   136     Potassium  4.3  4.0   4.4     Chloride  100  102   98     Calcium  9.6  8.9   9.4     Albumin  4.50  3.80   4.7     Total Bilirubin  0.3  0.3   0.2     Alkaline Phosphatase  104  86   92     AST (SGOT)  14  14   13     ALT (SGPT)  12  8   11     WBC 7.14  4.30  6.36       Hemoglobin 14.9  13.5  14.0       Hematocrit 47.0 (A)  42.0  44.8       Platelets 185  194  209       Total Cholesterol        142    Triglycerides        230 (A)    HDL Cholesterol        30 (A)    LDL Cholesterol         74    Hemoglobin A1C      6.90 (A)      Microalbumin, Urine         <1.2   (A) Abnormal value       Comments are available for some flowsheets but are not being displayed.                       Current Outpatient Medications on File Prior to Visit   Medication Sig Dispense Refill   • acetaminophen (TYLENOL) 500 MG tablet Take 2-3 tablets by mouth Every 6 (Six) Hours As Needed for Mild  "Pain. 60 tablet 3   • B-D UF III MINI PEN NEEDLES 31G X 5 MM misc      • bacitracin 500 UNIT/GM ointment Apply 1 application topically to the appropriate area as directed 2 (Two) Times a Day. 28 g 5   • bacitracin 500 UNIT/GM ointment Apply 1 application topically to the appropriate area as directed 2 (Two) Times a Day. 28 g 1   • Benzethonium Chloride (Dermal Wound Cleanser) 0.13 % liquid Spray on wounds daily as needed on both legs 236 mL 1   • clopidogrel (Plavix) 75 MG tablet Take 1 tablet by mouth Daily. (Patient taking differently: Take 75 mg by mouth Daily. Last dose Thursday 9/22/22) 90 tablet 0   • Continuous Blood Gluc Sensor (Dexcom G6 Sensor) See Admin Instructions.     • Continuous Blood Gluc Transmit (Dexcom G6 Transmitter) misc See Admin Instructions.     • Continuous Glucose Monitor Sup kit 1 kit Continuous. One continuous glucose meter, and supplies needed. Give one month worth of supplies, with 3 refills. ICD-10: e11.9 1 kit 3   • Elastic Bandages & Supports (ACE Elastic Bandage 4\") misc 1 Device Daily. One on each leg, bilateral.   #24 = one month supply. 24 each 3   • Elastic Bandages & Supports (Medical Compression Socks) misc 1 Device Daily. bilateral compression socks 2 each 1   • EPINEPHrine (EPIPEN) 0.3 MG/0.3ML solution auto-injector injection TAKE AS DIRECTED INCASE OF ALLERGIC REACTION.     • famotidine (PEPCID) 20 MG tablet Take 1 tablet by mouth 2 (Two) Times a Day. 30 tablet 0   • Flovent  MCG/ACT inhaler inhale 2 puffs by mouth twice daily (Patient taking differently: Inhale 1 puff 2 (Two) Times a Day.) 12 g 2   • FREESTYLE LITE test strip by Other route 4 (Four) Times a Day. Use to test blood sugar 4 times daily     • furosemide (LASIX) 40 MG tablet Take 1 tablet by mouth Daily. 90 tablet 1   • gabapentin (NEURONTIN) 300 MG capsule TAKE ONE CAPSULE BY MOUTH THREE TIMES DAILY (Patient taking differently: Take 300 mg by mouth 4 (Four) Times a Day.) 270 capsule 1   • " HYDROmorphone (DILAUDID) 1 mg/mL solution Infuse  into a venous catheter Dose Adjusted By Provider As Needed. PER PAIN PUMP     • Insulin Lispro (ADMELOG SOLOSTAR) 100 UNIT/ML injection pen SLIDING SCALE R/T BS. Takes 2-12 units tid 3 mL 3   • ipratropium-albuterol (DUO-NEB) 0.5-2.5 mg/3 ml nebulizer Take 3 mL by nebulization 4 (Four) Times a Day. 360 mL 0   • Lancets (freestyle) lancets Use as directed. Check sugars tid E11.9 100 each 0   • Lantus SoloStar 100 UNIT/ML injection pen Inject 20 Units under the skin into the appropriate area as directed. Pt does not have this med     • lidocaine (Lidoderm) 5 % Place 1 patch on the skin as directed by provider Daily. Remove & Discard patch within 12 hours or as directed by MD (Patient taking differently: Place 1 patch on the skin as directed by provider Daily. Remove & Discard patch within 12 hours or as directed by MD not using) 30 each 0   • ondansetron ODT (ZOFRAN-ODT) 4 MG disintegrating tablet Place 1 tablet on the tongue Every 8 (Eight) Hours As Needed for Nausea or Vomiting. 15 tablet 0   • pantoprazole (PROTONIX) 40 MG EC tablet Take 1 tablet by mouth Daily. 90 tablet 0   • Probiotic Product (Florajen3) capsule Take 1 capsule by mouth Daily. 30 capsule 2   • sodium chloride (NS) 0.9 % irrigation Irrigate with 250 mL to the affected area as directed by provider Daily. Cleanse wound daily with sterile water.     • triamcinolone (KENALOG) 0.1 % ointment Apply  topically to the appropriate area as directed 2 (Two) Times a Day. 30 g 0   • triamcinolone (KENALOG) 0.1 % ointment Apply 1 application topically to the appropriate area as directed 2 (Two) Times a Day. 30 g 0   • [DISCONTINUED] brompheniramine-pseudoephedrine-DM 30-2-10 MG/5ML syrup Take 10 mL by mouth 4 (Four) Times a Day As Needed for Allergies. 118 mL 0   • [DISCONTINUED] ProAir  (90 Base) MCG/ACT inhaler Inhale 2 puffs Every 4 (Four) Hours As Needed for Wheezing. 8.5 g 2     Current  Facility-Administered Medications on File Prior to Visit   Medication Dose Route Frequency Provider Last Rate Last Admin   • heparin injection 500 Units  5 mL Intravenous PRN Vidal Luevano APRN       • sodium chloride 0.9 % flush 10 mL  10 mL Intravenous Q12H Vidal Luevano APRN       • sodium chloride 0.9 % flush 10 mL  10 mL Intravenous PRN Vidal Luevano APRN       • sodium chloride 0.9 % flush 20 mL  20 mL Intravenous PRN Vidal Luevano APRN       • sterile water irrigation solution 500 mL  500 mL Irrigation Once Damari Cornejo PA-C        Negative strep, flu and Sars Covid 2/8/2023    Assessment and Plan  Diagnoses and all orders for this visit:    1. Acute cough (Primary)  -     POCT SARS-CoV-2 Antigen FLORINDA + Flu  -     POCT rapid strep A  -     brompheniramine-pseudoephedrine-DM 30-2-10 MG/5ML syrup; Take 10 mL by mouth 4 (Four) Times a Day As Needed for Cough or Allergies.  Dispense: 250 mL; Refill: 2    2. Sore throat  -     POCT SARS-CoV-2 Antigen FLORINDA + Flu  -     POCT rapid strep A    Other orders  -     guaiFENesin (MUCINEX) 600 MG 12 hr tablet; Take 2 tablets by mouth 2 (Two) Times a Day.  Dispense: 20 tablet; Refill: 1  -     azithromycin (Zithromax Z-Tom) 250 MG tablet; Take 2 tablets by mouth on day 1, then 1 tablet daily on days 2-5  Dispense: 6 tablet; Refill: 0  -     fluconazole (Diflucan) 150 MG tablet; Take 1 tablet by mouth 1 (One) Time for 1 dose.  Dispense: 1 tablet; Refill: 0        Follow Up   No follow-ups on file.  Patient was given instructions and counseling regarding her condition or for health maintenance advice. Please see specific information pulled into the AVS if appropriate.       Part of this note may be electronic transcription/translation of spoken language to printed text using the Dragon dictation system

## 2023-02-14 ENCOUNTER — TELEPHONE (OUTPATIENT)
Dept: CASE MANAGEMENT | Facility: OTHER | Age: 58
End: 2023-02-14
Payer: COMMERCIAL

## 2023-02-14 NOTE — TELEPHONE ENCOUNTER
Called patient and left message to return my call. My  name, reason for call and contact info was left on message.

## 2023-02-24 ENCOUNTER — OFFICE VISIT (OUTPATIENT)
Dept: FAMILY MEDICINE CLINIC | Facility: CLINIC | Age: 58
End: 2023-02-24
Payer: COMMERCIAL

## 2023-02-24 VITALS
OXYGEN SATURATION: 97 % | BODY MASS INDEX: 26.79 KG/M2 | SYSTOLIC BLOOD PRESSURE: 114 MMHG | WEIGHT: 166 LBS | DIASTOLIC BLOOD PRESSURE: 72 MMHG | TEMPERATURE: 98.8 F | HEART RATE: 69 BPM

## 2023-02-24 DIAGNOSIS — R19.7 DIARRHEA, UNSPECIFIED TYPE: Primary | ICD-10-CM

## 2023-02-24 DIAGNOSIS — R05.8 PRODUCTIVE COUGH: ICD-10-CM

## 2023-02-24 DIAGNOSIS — J02.9 ACUTE PHARYNGITIS, UNSPECIFIED ETIOLOGY: ICD-10-CM

## 2023-02-24 PROCEDURE — 99213 OFFICE O/P EST LOW 20 MIN: CPT | Performed by: NURSE PRACTITIONER

## 2023-02-24 PROCEDURE — 87428 SARSCOV & INF VIR A&B AG IA: CPT | Performed by: NURSE PRACTITIONER

## 2023-02-24 PROCEDURE — 3044F HG A1C LEVEL LT 7.0%: CPT | Performed by: NURSE PRACTITIONER

## 2023-02-24 PROCEDURE — 87880 STREP A ASSAY W/OPTIC: CPT | Performed by: NURSE PRACTITIONER

## 2023-02-24 RX ORDER — GUAIFENESIN/DEXTROMETHORPHAN 100-10MG/5
10 SYRUP ORAL 3 TIMES DAILY PRN
Qty: 237 ML | Refills: 1 | Status: SHIPPED | OUTPATIENT
Start: 2023-02-24 | End: 2023-03-14

## 2023-02-24 RX ORDER — FLUCONAZOLE 150 MG/1
150 TABLET ORAL ONCE
COMMUNITY
Start: 2023-02-08 | End: 2023-03-14

## 2023-02-24 RX ORDER — ONDANSETRON 4 MG/1
4 TABLET, ORALLY DISINTEGRATING ORAL EVERY 8 HOURS PRN
Qty: 15 TABLET | Refills: 0 | Status: SHIPPED | OUTPATIENT
Start: 2023-02-24 | End: 2023-03-20 | Stop reason: SDUPTHER

## 2023-02-24 NOTE — PROGRESS NOTES
[facility] Complaint  Fever, Chills, bowel issue, upset stomach , Med Refill, and Sore Throat    Subjective         Isha Llanes presents to [facility]  HPI   Presents today visit for chills, productive cough, upset stomach, diarrhea.  She was seen last week by Margaret Alicea.  She was prescribed a Z-Tom.  She took the Z-Tom for 3 days and then stop.  She was negative last week for COVID, flu, strep throat.  She is very sensitive to antibiotics in which the knee side effect occurs she feels that she is allergic and will stop the medication.    Social History     Socioeconomic History   • Marital status:    Tobacco Use   • Smoking status: Every Day     Packs/day: 2.00     Years: 41.00     Pack years: 82.00     Types: Cigarettes   • Smokeless tobacco: Never   Vaping Use   • Vaping Use: Never used   Substance and Sexual Activity   • Alcohol use: No   • Drug use: No   • Sexual activity: Not Currently        Objective     Vitals:    02/24/23 1105   BP: 114/72   Pulse: 69   Temp: 98.8 °F (37.1 °C)   SpO2: 97%   Weight: 75.3 kg (166 lb)        Body mass index is 26.79 kg/m².    Wt Readings from Last 3 Encounters:   02/24/23 75.3 kg (166 lb)   02/08/23 71.7 kg (158 lb)   01/12/23 72.1 kg (158 lb 14.4 oz)       BP Readings from Last 3 Encounters:   02/24/23 114/72   02/08/23 110/68   01/12/23 114/76         Physical Exam  Vitals reviewed.   Constitutional:       Appearance: Normal appearance. She is well-developed.   HENT:      Head: Normocephalic and atraumatic.      Right Ear: External ear normal.      Left Ear: External ear normal.      Nose: No nasal tenderness or congestion.      Right Sinus: No maxillary sinus tenderness or frontal sinus tenderness.      Left Sinus: No maxillary sinus tenderness or frontal sinus tenderness.      Mouth/Throat:      Lips: Pink.      Mouth: Mucous membranes are moist.      Pharynx: Posterior oropharyngeal erythema present. No oropharyngeal  exudate.   Eyes:      Conjunctiva/sclera: Conjunctivae normal.      Pupils: Pupils are equal, round, and reactive to light.   Cardiovascular:      Rate and Rhythm: Normal rate and regular rhythm.      Heart sounds: No murmur heard.    No friction rub. No gallop.   Pulmonary:      Effort: Pulmonary effort is normal.      Breath sounds: Normal breath sounds. No wheezing or rhonchi.   Abdominal:      General: Bowel sounds are normal. There is no distension.      Palpations: Abdomen is soft.      Tenderness: There is no abdominal tenderness.   Skin:     General: Skin is warm and dry.   Neurological:      Mental Status: She is alert.   Psychiatric:         Mood and Affect: Affect normal.          Result Review :   The following data was reviewed by: LUZ Zamora on 02/24/2023:      Procedures    Assessment and Plan   Diagnoses and all orders for this visit:    1. Diarrhea, unspecified type (Primary)  -     POCT SARS-CoV-2 Antigen FLORINDA + Flu    2. Acute pharyngitis, unspecified etiology  -     POCT rapid strep A    3. Productive cough    Other orders  -     guaiFENesin-dextromethorphan (ROBITUSSIN DM) 100-10 MG/5ML syrup; Take 10 mL by mouth 3 (Three) Times a Day As Needed for Cough or Congestion.  Dispense: 237 mL; Refill: 1  -     bismuth subsalicylate (Kaopectate) 262 MG/15ML suspension; Take 30 mL by mouth Every 4 (Four) Hours As Needed for Diarrhea.  Dispense: 236 mL; Refill: 1  -     ondansetron ODT (ZOFRAN-ODT) 4 MG disintegrating tablet; Place 1 tablet on the tongue Every 8 (Eight) Hours As Needed for Nausea or Vomiting.  Dispense: 15 tablet; Refill: 0    Will prescribe Kaopectate for the diarrhea.  For the productive cough acute pharyngitis and shortness of breath we will order Vikor nasopharyngeal respiratory swab.  Instruct her to finish her 2 doses of her azithromycin.  Will prescribe her Robitussin-DM for her productive cough.  Discussed symptomatic treatment for sore throat including throat lozenges  and warm fluids.  Will prescribe Zofran for nausea.        Follow Up   Return in about 4 weeks (around 3/24/2023), or if symptoms worsen or fail to improve, for Next scheduled follow up.  Patient was given instructions and counseling regarding her condition or for health maintenance advice. Please see specific information pulled into the AVS if appropriate.     Please note that portions of this note were completed with a voice recognition program.

## 2023-02-27 RX ORDER — CLOPIDOGREL BISULFATE 75 MG/1
75 TABLET ORAL DAILY
Qty: 90 TABLET | Refills: 3 | Status: SHIPPED | OUTPATIENT
Start: 2023-02-27

## 2023-03-09 ENCOUNTER — TELEPHONE (OUTPATIENT)
Dept: GASTROENTEROLOGY | Facility: CLINIC | Age: 58
End: 2023-03-09
Payer: COMMERCIAL

## 2023-03-09 NOTE — TELEPHONE ENCOUNTER
"Pt called and left a  requesting a return call.   Pt states she is experiencing bad, smelly gas and bowels. Bowels are having a change in colors, \"little knots in her stomach\" that she can feel to the touch. Pt previously recommended an office visit to discuss symptoms and inability to complete bowel prep for colonoscopies previously scheduled. Please advise.     "

## 2023-03-09 NOTE — TELEPHONE ENCOUNTER
Patient contacted the office to make an appointment. Informed patient that Wanda's next follow up was Sept 13th patient stated she didn't want to seen anyone but Dr Orlando informed her that Dr Orlando's next appointment was into October.  Patient then stated she just wanted to reschedule her colonoscopy. Per other phone note patient could not complete bowel prep and APRN advised office visit to discuss symptoms. Patient at no show for that appointment.  Please advise what we need to do for patient.  She did not want to be scheduled that far out.

## 2023-03-10 ENCOUNTER — TELEPHONE (OUTPATIENT)
Dept: FAMILY MEDICINE CLINIC | Facility: CLINIC | Age: 58
End: 2023-03-10
Payer: COMMERCIAL

## 2023-03-10 NOTE — TELEPHONE ENCOUNTER
Patient called to say she still has a sore throat and is out of cough syrup. Multiple people have tested positive for COVID in her home.    Patient also stated they would like a steroid called in since their lupus is acting up. She is very achy.    Patient would like a call back

## 2023-03-14 ENCOUNTER — OFFICE VISIT (OUTPATIENT)
Dept: GASTROENTEROLOGY | Facility: CLINIC | Age: 58
End: 2023-03-14
Payer: COMMERCIAL

## 2023-03-14 VITALS
SYSTOLIC BLOOD PRESSURE: 111 MMHG | WEIGHT: 166 LBS | BODY MASS INDEX: 26.68 KG/M2 | HEART RATE: 82 BPM | DIASTOLIC BLOOD PRESSURE: 78 MMHG | HEIGHT: 66 IN

## 2023-03-14 DIAGNOSIS — R15.9 INCONTINENCE OF FECES, UNSPECIFIED FECAL INCONTINENCE TYPE: ICD-10-CM

## 2023-03-14 DIAGNOSIS — K76.0 FATTY LIVER: ICD-10-CM

## 2023-03-14 DIAGNOSIS — R10.84 GENERALIZED ABDOMINAL PAIN: ICD-10-CM

## 2023-03-14 DIAGNOSIS — K59.00 CONSTIPATION, UNSPECIFIED CONSTIPATION TYPE: Primary | ICD-10-CM

## 2023-03-14 PROCEDURE — 99214 OFFICE O/P EST MOD 30 MIN: CPT | Performed by: NURSE PRACTITIONER

## 2023-03-14 PROCEDURE — 1160F RVW MEDS BY RX/DR IN RCRD: CPT | Performed by: NURSE PRACTITIONER

## 2023-03-14 PROCEDURE — 1159F MED LIST DOCD IN RCRD: CPT | Performed by: NURSE PRACTITIONER

## 2023-03-14 NOTE — PROGRESS NOTES
Chief Complaint/Care Team   No chief complaint on file.     No ref. provider found  Vidal Luevano APRN       History of Present Illness     Diagnosis: Hodgkin's Lymphoma Stage III diagnosed 2008    Current Treatment: Active Surveillance    Previous Treatment:   -In 2008, s/p 3 cycles of ABVD (declined further treatment after 3 cycles, but was in remission)  -treated at [facility] after Right groin LN seen and B symptoms with 4 additional cycles of ABVD with last 2 cycles without Bleomycin  -Right inguinal LN PET Avid treated with RT from 2/2012-3/2012 with 30 Gy  -Active Surveillance from [id number], scanned records from Dr. Teodoro Mancuso ([facility]) from 11/2/2021 stated pt underwent PET CT scan which was negative for evidence of recurrence    History of Present Illness:     Isha Llanes is a 57 y.o. female who presents to [facility] HEMATOLOGY & ONCOLOGY for follow up regarding history of Hodgkin's Lymphoma stage III s/p treatment with ABVD diagnosed 2008.      Per scanned records from Dr. Shala Norris, (pt reported she was initially treated for a spider bite until lesion was biopsied and revealed Hodgkin's lympoma), treated in 2008     when she had nodular sclerosing Hodgkin's lymphoma stage III.  She received 3     cycles of ABVD.  She refused further treatment as she could not tolerate it.      She did go into remission with the 3 cycles which remained for a while.  From     February 2011 to October 2011 she was evaluated at [facility] when     she had right groin lymph node with B symptoms, but she reports receiving treatment in [place], [place] by Dr. Casey.  She received 4 additional     cycles of ABVD the last 2 without bleomycin.  She had a solitary lymph node residual in the right inguinal region which was PET avid and was treated with     radiation therapy February 2012 to March 2012 30 Gy.  She has been followed     since then with  CAT scans and PET scans which show small nonpathologically     enlarged lymph nodes.  She says she also got chemotherapy with Dr. Gaming before and had been following up with Dr. Paredes prior to both providers leaving their practice.  Most recently per scanned clinic note from Dr. Teodoro Mancuso (private heme-onc practice in [facility]), patient with PET/CT around November 2021 which did not reveal any evidence of cancer recurrence.       Patient has past medical history significant for anxiety, chronic ulcers in her lower extremities secondary to poorly controlled diabetes/chronic venous stasis, diabetes mellitus with use of continuous glucose monitor.  Patient's pain is currently managed with Dr. Boudreaux in [place] and she has a Dilaudid pain pump implanted in her right lower back.  She reports Dr. Boudreaux was planning to do a dye study her pain pump to assess whether or not the pump is functioning properly.    Patient reports being wheelchair-bound for several months to years reports difficulty standing up and difficulty walking long distances secondary to weakness in her lower extremities.  Reports being able to complete her ADLs, but does not drive.  Currently lives with her daughter and 3 grandchildren.        Interval History:  Pt is here for telehealth appointment and to discuss results of recent PET CT scan obtained to assess for cancer recurrence.  She has her daughter on the phone who provides additional medical history. Patient reports she is recovering from a upper respiratory tract infection, which she thinks could possibly be the flu, reports continued shortness of air with exertion, reports stable lymphadenopathy in her cervical region of her neck.  She continues to report fatigue and difficulty sleeping and anxiety.  She has an upcoming appointment with a psychiatrist on 10/22/2022. Pt continues to follow up with pain medication doctor in [place], [place].  She reports chronic ulcers in  her lower extremities secondary to poorly controlled diabetes/chronic venous stasis which are stable.      Review of Systems   Constitutional: Positive for fatigue. Negative for chills and fever. Appetite change: decreased.   HENT: Positive for swollen glands.    Eyes: Positive for double vision.   Respiratory: Positive for shortness of breath. Negative for cough.    Gastrointestinal: Positive for abdominal pain.   Genitourinary: Pelvic pain: right side up into stomach.   Musculoskeletal: Positive for back pain.   Skin: Negative for rash.   Neurological: Positive for weakness.   Hematological: Positive for adenopathy (nodule on neck- pt is unawre of which side). Does not bruise/bleed easily.   Psychiatric/Behavioral: Positive for sleep disturbance.   All other systems reviewed and are negative.       Oncology/Hematology History    No history exists.       Objective     There were no vitals filed for this visit.            No physical exam performed as this was a telehealth/telephone clinic visit.  PHQ-9 Total Score:  Not applicable           Past Medical History     Past Medical History:   Diagnosis Date   • Anxiety     NO CURRENT MEDICATION   • Aortic stenosis, severe    • Arthritis    • Asthma    • Back pain    • Blood clotting disorder (HCC)    • Cancer associated pain    • Chronic low back pain with sciatica    • Chronic pain disorder    • Diabetes mellitus (HCC)     TYPE 2   • Dyspnea on effort    • GERD (gastroesophageal reflux disease)    • H/O lumpectomy    • H/O: hysterectomy    • Hiatal hernia    • History of degenerative disc disease    • History of kidney stones    • History of pulmonary embolus (PE)    • History of transfusion    • Hodgkin's lymphoma (HCC)    • Immobility    • Liver disease    • Lupus (HCC)    • Mitral valve prolapse    • Panic disorder    • Pelvic pain    • Peripheral neuropathy    • Personal history of DVT (deep vein thrombosis)    • Presence of intrathecal pump    • PTSD  "(post-traumatic stress disorder)    • Sacroiliac joint disease    • SI (sacroiliac) joint inflammation (HCC)    • Venous insufficiency      Current Outpatient Medications on File Prior to Visit   Medication Sig Dispense Refill   • acetaminophen (TYLENOL) 500 MG tablet Take 2-3 tablets by mouth Every 6 (Six) Hours As Needed for Mild Pain. 60 tablet 3   • albuterol sulfate HFA (ProAir HFA) 108 (90 Base) MCG/ACT inhaler Inhale 2 puffs Every 4 (Four) Hours As Needed for Wheezing. 8.5 g 2   • B-D UF III MINI PEN NEEDLES 31G X 5 MM misc      • bacitracin 500 UNIT/GM ointment Apply 1 application topically to the appropriate area as directed 2 (Two) Times a Day. 28 g 5   • bacitracin 500 UNIT/GM ointment Apply 1 application topically to the appropriate area as directed 2 (Two) Times a Day. 28 g 1   • Benzethonium Chloride (Dermal Wound Cleanser) 0.13 % liquid Spray on wounds daily as needed on both legs 236 mL 1   • bismuth subsalicylate (Kaopectate) 262 MG/15ML suspension Take 30 mL by mouth Every 4 (Four) Hours As Needed for Diarrhea. 236 mL 1   • brompheniramine-pseudoephedrine-DM 30-2-10 MG/5ML syrup Take 10 mL by mouth 4 (Four) Times a Day As Needed for Cough or Allergies. 250 mL 2   • clopidogrel (Plavix) 75 MG tablet Take 1 tablet by mouth Daily. 90 tablet 3   • Continuous Blood Gluc Sensor (Dexcom G6 Sensor) See Admin Instructions.     • Continuous Blood Gluc Transmit (Dexcom G6 Transmitter) misc See Admin Instructions.     • Continuous Glucose Monitor Sup kit 1 kit Continuous. One continuous glucose meter, and supplies needed. Give one month worth of supplies, with 3 refills. ICD-10: e11.9 1 kit 3   • Elastic Bandages & Supports (ACE Elastic Bandage 4\") misc 1 Device Daily. One on each leg, bilateral.   #24 = one month supply. 24 each 3   • Elastic Bandages & Supports (Medical Compression Socks) misc 1 Device Daily. bilateral compression socks 2 each 1   • EPINEPHrine (EPIPEN) 0.3 MG/0.3ML solution auto-injector " injection TAKE AS DIRECTED INCASE OF ALLERGIC REACTION.     • famotidine (PEPCID) 20 MG tablet Take 1 tablet by mouth 2 (Two) Times a Day. 30 tablet 0   • Flovent  MCG/ACT inhaler inhale 2 puffs by mouth twice daily (Patient taking differently: Inhale 1 puff 2 (Two) Times a Day.) 12 g 2   • fluconazole (DIFLUCAN) 150 MG tablet Take 150 mg by mouth 1 (One) Time.     • FREESTYLE LITE test strip by Other route 4 (Four) Times a Day. Use to test blood sugar 4 times daily     • furosemide (LASIX) 40 MG tablet Take 1 tablet by mouth Daily. 90 tablet 1   • gabapentin (NEURONTIN) 300 MG capsule TAKE ONE CAPSULE BY MOUTH THREE TIMES DAILY (Patient taking differently: Take 300 mg by mouth 4 (Four) Times a Day.) 270 capsule 1   • guaiFENesin (MUCINEX) 600 MG 12 hr tablet Take 2 tablets by mouth 2 (Two) Times a Day. 20 tablet 1   • guaiFENesin-dextromethorphan (ROBITUSSIN DM) 100-10 MG/5ML syrup Take 10 mL by mouth 3 (Three) Times a Day As Needed for Cough or Congestion. 237 mL 1   • HYDROmorphone (DILAUDID) 1 mg/mL solution Infuse  into a venous catheter Dose Adjusted By Provider As Needed. PER PAIN PUMP     • Insulin Lispro (ADMELOG SOLOSTAR) 100 UNIT/ML injection pen SLIDING SCALE R/T BS. Takes 2-12 units tid 3 mL 3   • ipratropium-albuterol (DUO-NEB) 0.5-2.5 mg/3 ml nebulizer Take 3 mL by nebulization 4 (Four) Times a Day. 360 mL 0   • Lancets (freestyle) lancets Use as directed. Check sugars tid E11.9 100 each 0   • Lantus SoloStar 100 UNIT/ML injection pen Inject 20 Units under the skin into the appropriate area as directed. Pt does not have this med     • lidocaine (Lidoderm) 5 % Place 1 patch on the skin as directed by provider Daily. Remove & Discard patch within 12 hours or as directed by MD (Patient taking differently: Place 1 patch on the skin as directed by provider Daily. Remove & Discard patch within 12 hours or as directed by MD not using) 30 each 0   • ondansetron ODT (ZOFRAN-ODT) 4 MG disintegrating  tablet Place 1 tablet on the tongue Every 8 (Eight) Hours As Needed for Nausea or Vomiting. 15 tablet 0   • pantoprazole (PROTONIX) 40 MG EC tablet Take 1 tablet by mouth Daily. 90 tablet 0   • Probiotic Product (Florajen3) capsule Take 1 capsule by mouth Daily. 30 capsule 2   • sodium chloride (NS) 0.9 % irrigation Irrigate with 250 mL to the affected area as directed by provider Daily. Cleanse wound daily with sterile water.     • triamcinolone (KENALOG) 0.1 % ointment Apply  topically to the appropriate area as directed 2 (Two) Times a Day. 30 g 0   • triamcinolone (KENALOG) 0.1 % ointment Apply 1 application topically to the appropriate area as directed 2 (Two) Times a Day. 30 g 0     Current Facility-Administered Medications on File Prior to Visit   Medication Dose Route Frequency Provider Last Rate Last Admin   • heparin injection 500 Units  5 mL Intravenous PRN Vidal Luevano, APRN       • sodium chloride 0.9 % flush 10 mL  10 mL Intravenous Q12H Vidal Luevano, APRN       • sodium chloride 0.9 % flush 10 mL  10 mL Intravenous PRN Vidal Luevano, APRN       • sodium chloride 0.9 % flush 20 mL  20 mL Intravenous PRN Vidal Luevano, APRN       • sterile water irrigation solution 500 mL  500 mL Irrigation Once Damari Cornejo PA-C          Allergies   Allergen Reactions   • Amoxicillin Shortness Of Breath   • Ceclor [Cefaclor] Shortness Of Breath   • Penicillins Shortness Of Breath   • Sulfa Antibiotics Rash   • Valium [Diazepam] Mental Status Change   • Ambien [Zolpidem] Unknown - Low Severity   • Aspirin GI Intolerance   • Ativan [Lorazepam] Unknown - Low Severity   • Benadryl [Diphenhydramine] Unknown - Low Severity   • Biaxin [Clarithromycin] Unknown - Low Severity   • Cefazolin Unknown - Low Severity   • Cephalexin Unknown - Low Severity   • Cephalosporins Unknown - Low Severity   • Clindamycin Unknown - Low Severity   • Compazine [Prochlorperazine Edisylate] Rash   • Contrast Dye (Echo Or Unknown  Ct/Mr) Other (See Comments)     Caused pain in her arm   • Doxycycline Rash   • Eggs Or Egg-Derived Products Unknown - Low Severity   • Nsaids Unknown - Low Severity   • Phenergan [Promethazine Hcl] Rash   • Promethazine Unknown - Low Severity   • Vancomycin Itching     Past Surgical History:   Procedure Laterality Date   • BACK SURGERY      SPINAL FUSION    • BLADDER REPAIR     • CARDIAC CATHETERIZATION N/A 3/6/2019    Procedure: Valvuloplasty;  Surgeon: Wayne Johnson MD;  [facility] INVASIVE LOCATION;  Service: Cardiology   • CARDIAC VALVE REPLACEMENT  2021   • CHOLECYSTECTOMY     • COLONOSCOPY N/A 12/29/2021    Procedure: COLONOSCOPY;  Surgeon: Karine Orlando MD;  Location: [facility] ENDOSCOPY;  Service: Gastroenterology;  Laterality: N/A;  POOR PREP   • COLONOSCOPY N/A 4/13/2022    Procedure: COLONOSCOPY WITH POLYPECTOMY;  Surgeon: Karine Orlando MD;  Location: [facility] ENDOSCOPY;  Service: Gastroenterology;  Laterality: N/A;  COLON POLYP, HEMORRHOIDS, POOR PREP   • ENDOSCOPY N/A 12/29/2021    Procedure: ESOPHAGOGASTRODUODENOSCOPY;  Surgeon: Karine Orlando MD;  Location: [facility] ENDOSCOPY;  Service: Gastroenterology;  Laterality: N/A;  ESOPHAGITIS, GASTRITIS, HIATAL HERNIA   • HYSTERECTOMY     • LYMPHADENECTOMY     • PACEMAKER IMPLANTATION     • PAIN PUMP INSERTION/REVISION N/A 10/18/2019    Procedure: PAIN PUMP INSERTION [facility] [place] 10[facility]18[facility]19 [place];  Surgeon: Horace Rios MD;  Location: [facility];  Service: Pain Management   • PAIN PUMP INSERTION/REVISION N/A 11/23/2020    Procedure: pain pump removal;  Surgeon: Horace Rios MD;  Location: [facility];  Service: Pain Management;  Laterality: N/A;   • TONSILLECTOMY     • TUBAL ABDOMINAL LIGATION     • TUMOR REMOVAL       Social History     Socioeconomic History   • Marital status:    Tobacco Use   • Smoking status: Every Day     Packs/day: 2.00     Years: 41.00     Pack years: 82.00     Types:  Cigarettes   • Smokeless tobacco: Never   Vaping Use   • Vaping Use: Never used   Substance and Sexual Activity   • Alcohol use: No   • Drug use: No   • Sexual activity: Not Currently     Family History   Problem Relation Age of Onset   • Anxiety disorder Mother    • Depression Sister    • Bipolar disorder Sister    • Pancreatic cancer Sister    • ADD / ADHD Brother    • Pancreatic cancer Paternal Grandmother    • ADD / ADHD Granddaughter    • ADD / ADHD Grandson    • ADD / ADHD Nephew    • Malig Hyperthermia Neg Hx        Results     Result Review   The following data was reviewed by: Jeaneth Haywood MD on 09/13/2022:  Lab Results   Component Value Date    HGB 14.0 01/12/2023    HCT 44.8 01/12/2023    MCV 84.1 01/12/2023     01/12/2023    WBC 6.36 01/12/2023    NEUTROABS 4.61 01/12/2023    LYMPHSABS 1.20 01/12/2023    MONOSABS 0.43 01/12/2023    EOSABS 0.08 01/12/2023    BASOSABS 0.02 01/12/2023     Lab Results   Component Value Date    GLUCOSE 186 (H) 01/12/2023    BUN 19 01/12/2023    CREATININE 0.56 (L) 01/12/2023     01/12/2023    K 4.4 01/12/2023    CL 98 01/12/2023    CO2 29.0 01/12/2023    CALCIUM 9.4 01/12/2023    PROTEINTOT 7.8 01/12/2023    ALBUMIN 4.7 01/12/2023    BILITOT 0.2 01/12/2023    ALKPHOS 92 01/12/2023    AST 13 01/12/2023    ALT 11 01/12/2023     Lab Results   Component Value Date    MG 1.9 09/05/2022    PHOS 3.9 06/07/2021    TSH 1.540 01/12/2023     ESR from 9/13/2022 was 36       No radiology results for the last day       Assessment & Plan     There are no diagnoses linked to this encounter.    Isha Llanes is a 57 y.o. female who presents to [facility] HEMATOLOGY & ONCOLOGY for follow up regarding Hodgkin's Lymphoma is currently under active surveillance.  Please see above for treatment history.   She has been following with Dr. Mancuso local primary oncologist here in [place], [place].  Most recent clinic notes from his office report patient  went a PET/CT in approximately November 2021 which was negative for any recurrence of Hodgkin's lymphoma.      Telehealth clinic appointment today to discuss PET CT scan obtained in 10/4/2022 and recent labs collected at last appointment.  I discussed with patient and daughter the PET CT scan which did not reveal any evidence of lymphoma recurrence. Also, since pt's PET CT scan was negative, shared that the ESR level elevation was likely reactive from her chronic ulcers, her upper respiratory tract infection, and less likely from lymphoma.  Patient and daughter were concerned about the 2 small consolidations in her bilateral upper lobe lung lobes, which I explained were likely infectious in etiology.  Given patient and daughter's continued concern I placed a referral for her to be evaluated in pulmonology clinic for the consolidations and for emphysema management.     Discussed my plan to have patient follow-up in 5-6 months for surveillance of her Hodgkin lymphoma.    Recommend patient continue to follow-up with her pain management physician in [facility] along with attend upcoming appointment with psychiatrist.    Had NM PET CT scan on 10/4/2022    Labs to order: CBC, CMP, ESR      Follow Up     I spent 30 minutes caring for Isha on this date of service. This time includes time spent by me in the following activities: preparing for the visit, reviewing tests, obtaining and/or reviewing a separately obtained history, documenting information in the medical record and care coordination.    This is an acute or chronic illness that poses a threat to life or bodily function. The above treatment plan involves a high risk of complications and/or mortality of patient management.    The patient was seen and examined. Work by the provider also included review and/or ordering of lab tests, review and/or ordering of radiology tests, review and/or ordering of medicine tests, discussion with other physicians or  providers, independent review of data, obtaining old records, review/summation of old records, and/or other review. Patient was seen during the coronavirus pandemic. Additional time and precaution was required to complete their clinic visit and/or treatment visit due to these issues.    I have reviewed the family history, social history, and past medical history for this patient. Previous information and data has been reviewed and updated as needed. I have reviewed and verified the chief complaint, history, and other documentation. The patient was interviewed and examined at the bedside and the chart reviewed. The previous observations, recommendations, and conclusions were reviewed including those of other providers.     The plan was discussed with the patient and/or family. The patient was given time to ask questions and these questions were answered. At the conclusion of their visit they had no additional questions or concerns and all questions were answered to their satisfaction.      Patient was given instructions and counseling regarding her condition or for health maintenance advice. Please see specific information pulled into the AVS if appropriate.

## 2023-03-14 NOTE — PROGRESS NOTES
"Chief Complaint     Gas, Change in Bowel movements , and Follow-up (\"Patient states little knots in abdominal area that can be felt\"  )    History of Present Illness     Isha Llanes is a 57 y.o. female who presents to [facility]STR[facility]OLOGY for follow-up of GERD, hepatic steatosis, history of hodgkin's lymphoma, change in bowel habits, nausea and diarrhea.      She reports abdominal swelling and \"knots\" on abdomen.  Admits abdominal bloating.      Reports that she's experiencing fecal incontinence.  She is taking imodium about once per day to help with this.  Denies rectal bleeding.  Reports that she has a bowel movement daily.  Admits incomplete evacuation.      Reports a decrease in appetite.         History      Past Medical History:   Diagnosis Date   • Anxiety     NO CURRENT MEDICATION   • Aortic stenosis, severe    • Arthritis    • Asthma    • Back pain    • Blood clotting disorder (HCC)    • Cancer associated pain    • Chronic low back pain with sciatica    • Chronic pain disorder    • Diabetes mellitus (HCC)     TYPE 2   • Dyspnea on effort    • GERD (gastroesophageal reflux disease)    • H/O lumpectomy    • H/O: hysterectomy    • Hiatal hernia    • History of degenerative disc disease    • History of kidney stones    • History of pulmonary embolus (PE)    • History of transfusion    • Hodgkin's lymphoma (HCC)    • Immobility    • Liver disease    • Lupus (HCC)    • Mitral valve prolapse    • Panic disorder    • Pelvic pain    • Peripheral neuropathy    • Personal history of DVT (deep vein thrombosis)    • Presence of intrathecal pump    • PTSD (post-traumatic stress disorder)    • Sacroiliac joint disease    • SI (sacroiliac) joint inflammation (HCC)    • Venous insufficiency      Past Surgical History:   Procedure Laterality Date   • BACK SURGERY      SPINAL FUSION    • BLADDER REPAIR     • CARDIAC CATHETERIZATION N/A 3/6/2019    Procedure: Valvuloplasty;  Surgeon: Elizabeth" Wayne MOLINA MD;  Location: [facility] CATH INVASIVE LOCATION;  Service: Cardiology   • CARDIAC VALVE REPLACEMENT  2021   • CHOLECYSTECTOMY     • COLONOSCOPY N/A 12/29/2021    Procedure: COLONOSCOPY;  Surgeon: Karine Orlando MD;  Location: [facility] ENDOSCOPY;  Service: Gastroenterology;  Laterality: N/A;  POOR PREP   • COLONOSCOPY N/A 4/13/2022    Procedure: COLONOSCOPY WITH POLYPECTOMY;  Surgeon: Karine Orlando MD;  Location: [facility] ENDOSCOPY;  Service: Gastroenterology;  Laterality: N/A;  COLON POLYP, HEMORRHOIDS, POOR PREP   • ENDOSCOPY N/A 12/29/2021    Procedure: ESOPHAGOGASTRODUODENOSCOPY;  Surgeon: Karine Orlando MD;  Location: [facility] ENDOSCOPY;  Service: Gastroenterology;  Laterality: N/A;  ESOPHAGITIS, GASTRITIS, HIATAL HERNIA   • HYSTERECTOMY     • LYMPHADENECTOMY     • PACEMAKER IMPLANTATION     • PAIN PUMP INSERTION/REVISION N/A 10/18/2019    Procedure: PAIN PUMP INSERTION [facility] 10-18-19 [place];  Surgeon: Horace Rios MD;  Location: [facility] OR;  Service: Pain Management   • PAIN PUMP INSERTION/REVISION N/A 11/23/2020    Procedure: pain pump removal;  Surgeon: Horace Rios MD;  Location: [facility] OR;  Service: Pain Management;  Laterality: N/A;   • TONSILLECTOMY     • TUBAL ABDOMINAL LIGATION     • TUMOR REMOVAL       Family History   Problem Relation Age of Onset   • Anxiety disorder Mother    • Depression Sister    • Bipolar disorder Sister    • Pancreatic cancer Sister    • ADD / ADHD Brother    • Pancreatic cancer Paternal Grandmother    • ADD / ADHD Granddaughter    • ADD / ADHD Grandson    • ADD / ADHD Nephew    • Malig Hyperthermia Neg Hx         Current Medications       Current Outpatient Medications:   •  acetaminophen (TYLENOL) 500 MG tablet, Take 2-3 tablets by mouth Every 6 (Six) Hours As Needed for Mild Pain., Disp: 60 tablet, Rfl: 3  •  albuterol sulfate HFA (ProAir HFA) 108 (90 Base) MCG/ACT inhaler, Inhale 2 puffs Every 4 (Four) Hours As  "Needed for Wheezing., Disp: 8.5 g, Rfl: 2  •  B-D UF III MINI PEN NEEDLES 31G X 5 MM misc, , Disp: , Rfl:   •  bacitracin 500 UNIT/GM ointment, Apply 1 application topically to the appropriate area as directed 2 (Two) Times a Day., Disp: 28 g, Rfl: 5  •  bacitracin 500 UNIT/GM ointment, Apply 1 application topically to the appropriate area as directed 2 (Two) Times a Day., Disp: 28 g, Rfl: 1  •  Benzethonium Chloride (Dermal Wound Cleanser) 0.13 % liquid, Spray on wounds daily as needed on both legs, Disp: 236 mL, Rfl: 1  •  clopidogrel (Plavix) 75 MG tablet, Take 1 tablet by mouth Daily., Disp: 90 tablet, Rfl: 3  •  Continuous Blood Gluc Sensor (Dexcom G6 Sensor), See Admin Instructions., Disp: , Rfl:   •  Continuous Blood Gluc Transmit (Dexcom G6 Transmitter) misc, See Admin Instructions., Disp: , Rfl:   •  Continuous Glucose Monitor Sup kit, 1 kit Continuous. One continuous glucose meter, and supplies needed. Give one month worth of supplies, with 3 refills. ICD-10: e11.9, Disp: 1 kit, Rfl: 3  •  Elastic Bandages & Supports (ACE Elastic Bandage 4\") misc, 1 Device Daily. One on each leg, bilateral.   #24 = one month supply., Disp: 24 each, Rfl: 3  •  Elastic Bandages & Supports (Medical Compression Socks) misc, 1 Device Daily. bilateral compression socks, Disp: 2 each, Rfl: 1  •  EPINEPHrine (EPIPEN) 0.3 MG/0.3ML solution auto-injector injection, TAKE AS DIRECTED INCASE OF ALLERGIC REACTION., Disp: , Rfl:   •  FREESTYLE LITE test strip, by Other route 4 (Four) Times a Day. Use to test blood sugar 4 times daily, Disp: , Rfl:   •  furosemide (LASIX) 40 MG tablet, Take 1 tablet by mouth Daily., Disp: 90 tablet, Rfl: 1  •  gabapentin (NEURONTIN) 300 MG capsule, TAKE ONE CAPSULE BY MOUTH THREE TIMES DAILY (Patient taking differently: Take 1 capsule by mouth 4 (Four) Times a Day.), Disp: 270 capsule, Rfl: 1  •  HYDROmorphone (DILAUDID) 1 mg/mL solution, Infuse  into a venous catheter Dose Adjusted By Provider As Needed. " PER PAIN PUMP, Disp: , Rfl:   •  ipratropium-albuterol (DUO-NEB) 0.5-2.5 mg/3 ml nebulizer, Take 3 mL by nebulization 4 (Four) Times a Day., Disp: 360 mL, Rfl: 0  •  Lancets (freestyle) lancets, Use as directed. Check sugars tid E11.9, Disp: 100 each, Rfl: 0  •  Lantus SoloStar 100 UNIT/ML injection pen, Inject 20 Units under the skin into the appropriate area as directed. Pt does not have this med, Disp: , Rfl:   •  ondansetron ODT (ZOFRAN-ODT) 4 MG disintegrating tablet, Place 1 tablet on the tongue Every 8 (Eight) Hours As Needed for Nausea or Vomiting., Disp: 15 tablet, Rfl: 0  •  sodium chloride (NS) 0.9 % irrigation, Irrigate with 250 mL to the affected area as directed by provider Daily. Cleanse wound daily with sterile water., Disp: , Rfl:   •  triamcinolone (KENALOG) 0.1 % ointment, Apply  topically to the appropriate area as directed 2 (Two) Times a Day., Disp: 30 g, Rfl: 0  •  triamcinolone (KENALOG) 0.1 % ointment, Apply 1 application topically to the appropriate area as directed 2 (Two) Times a Day., Disp: 30 g, Rfl: 0  •  methylcellulose (Citrucel) oral powder, Take 2 application by mouth Daily., Disp: 850 g, Rfl: 1    Current Facility-Administered Medications:   •  heparin injection 500 Units, 5 mL, Intravenous, PRN, Chloé, Vidal, APRN  •  sodium chloride 0.9 % flush 10 mL, 10 mL, Intravenous, Q12H, Chloé, Vidal, APRN  •  sodium chloride 0.9 % flush 10 mL, 10 mL, Intravenous, PRN, Chloé, Vidal, APRN  •  sodium chloride 0.9 % flush 20 mL, 20 mL, Intravenous, PRN, Chloé, Vidal, APRN  •  sterile water irrigation solution 500 mL, 500 mL, Irrigation, Once, Damari Cornejo PA-C     Allergies     Allergies   Allergen Reactions   • Amoxicillin Shortness Of Breath   • Ceclor [Cefaclor] Shortness Of Breath   • Penicillins Shortness Of Breath   • Sulfa Antibiotics Rash   • Valium [Diazepam] Mental Status Change   • Ambien [Zolpidem] Unknown - Low Severity   • Aspirin GI Intolerance   • Ativan  "[Lorazepam] Unknown - Low Severity   • Benadryl [Diphenhydramine] Unknown - Low Severity   • Biaxin [Clarithromycin] Unknown - Low Severity   • Cefazolin Unknown - Low Severity   • Cephalexin Unknown - Low Severity   • Cephalosporins Unknown - Low Severity   • Clindamycin Unknown - Low Severity   • Compazine [Prochlorperazine Edisylate] Rash   • Contrast Dye (Echo Or Unknown Ct/Mr) Other (See Comments)     Caused pain in her arm   • Doxycycline Rash   • Eggs Or Egg-Derived Products Unknown - Low Severity   • Nsaids Unknown - Low Severity   • Phenergan [Promethazine Hcl] Rash   • Promethazine Unknown - Low Severity   • Vancomycin Itching       Social History       Social History     Social History Narrative   • Not on file         Objective       /78 (BP Location: Right arm, Patient Position: Sitting, Cuff Size: Large Adult)   Pulse 82   Ht 167.6 cm (65.98\")   Wt 75.3 kg (166 lb)   BMI 26.81 kg/m²       Physical Exam    Results       Result Review :    The following data was reviewed by: LUZ Rene on 03/14/2023:    CBC w/diff    CBC w/Diff 11/26/22 11/27/22 1/12/23   WBC 7.14 4.30 6.36   RBC 5.63 (A) 5.02 5.33 (A)   Hemoglobin 14.9 13.5 14.0   Hematocrit 47.0 (A) 42.0 44.8   MCV 83.5 83.7 84.1   MCH 26.5 (A) 26.9 26.3 (A)   MCHC 31.7 32.1 31.3 (A)   RDW 14.6 15.6 (A) 14.9   Platelets 185 194 209   Neutrophil Rel % 83.9 (A) 59.8 72.4   Immature Granulocyte Rel % 0.4 0.2 0.3   Lymphocyte Rel % 9.1 (A) 29.1 18.9 (A)   Monocyte Rel % 5.2 8.1 6.8   Eosinophil Rel % 1.3 2.6 1.3   Basophil Rel % 0.1 0.2 0.3   (A) Abnormal value            CMP    CMP 11/26/22 11/27/22 1/12/23   Glucose 146 (A) 111 (A) 186 (A)   BUN 15 20 19   Creatinine 0.52 (A) 0.56 (A) 0.56 (A)   eGFR 109.2 107.3 106.6   Sodium 138 140 136   Potassium 4.3 4.0 4.4   Chloride 100 102 98   Calcium 9.6 8.9 9.4   Total Protein 8.1 6.8 7.8   Albumin 4.50 3.80 4.7   Globulin 3.6 3.0 3.1   Total Bilirubin 0.3 0.3 0.2   Alkaline " Phosphatase 104 86 92   AST (SGOT) 14 14 13   ALT (SGPT) 12 8 11   Albumin/Globulin Ratio 1.3 1.3 1.5   BUN/Creatinine Ratio 28.8 (A) 35.7 (A) 33.9 (A)   Anion Gap 12.1 8.2 9.0   (A) Abnormal value       Comments are available for some flowsheets but are not being displayed.           12/29/2021 EGD-grade A esophagitis at the GE junction, biopsy-reflux esophagitis.  Small hiatal hernia.  Diffuse mildly erythematous mucosa found in the gastric antrum, biposy - normal.  Negative for H.pylori.  Normal duodenum.  Flexible sigmoidoscopy - stool in the entire colon.  No specimens collected.      4/13/22 colonoscopy - Hemorrhoids found on perianal exam.  One 5 mm polyp in the sigmoid colon - hyperplastic, completely removed.  Stool in the entire examined colon.  Repeat colonoscopy with 2 day extended bowel prep anytime within the 3 months because the bowel preparation was poor. Recommend to follow-up in office prior to next colonoscopy to discuss constipation.               Assessment and Plan              Diagnoses and all orders for this visit:    1. Constipation, unspecified constipation type (Primary)  -     methylcellulose (Citrucel) oral powder; Take 2 application by mouth Daily.  Dispense: 850 g; Refill: 1    2. Incontinence of feces, unspecified fecal incontinence type  -     methylcellulose (Citrucel) oral powder; Take 2 application by mouth Daily.  Dispense: 850 g; Refill: 1    3. Generalized abdominal pain  -     US Abdomen Complete; Future    4. Fatty liver  -     US Abdomen Complete; Future            Follow Up     Follow Up   Return in about 3 months (around 6/14/2023) for fatty liver.  Patient was given instructions and counseling regarding her condition or for health maintenance advice. Please see specific information pulled into the AVS if appropriate.

## 2023-03-16 ENCOUNTER — LAB (OUTPATIENT)
Dept: ONCOLOGY | Facility: HOSPITAL | Age: 58
End: 2023-03-16
Payer: COMMERCIAL

## 2023-03-16 ENCOUNTER — OFFICE VISIT (OUTPATIENT)
Dept: ONCOLOGY | Facility: HOSPITAL | Age: 58
End: 2023-03-16
Payer: COMMERCIAL

## 2023-03-16 ENCOUNTER — TELEPHONE (OUTPATIENT)
Dept: ONCOLOGY | Facility: HOSPITAL | Age: 58
End: 2023-03-16

## 2023-03-16 VITALS
BODY MASS INDEX: 26.81 KG/M2 | SYSTOLIC BLOOD PRESSURE: 107 MMHG | HEART RATE: 86 BPM | DIASTOLIC BLOOD PRESSURE: 56 MMHG | RESPIRATION RATE: 16 BRPM | TEMPERATURE: 98.1 F | OXYGEN SATURATION: 95 % | WEIGHT: 166.01 LBS

## 2023-03-16 DIAGNOSIS — Z85.71 HISTORY OF HODGKIN'S LYMPHOMA: Primary | ICD-10-CM

## 2023-03-16 DIAGNOSIS — F32.A DEPRESSION, UNSPECIFIED DEPRESSION TYPE: ICD-10-CM

## 2023-03-16 DIAGNOSIS — Z85.71 HISTORY OF HODGKIN'S LYMPHOMA: ICD-10-CM

## 2023-03-16 DIAGNOSIS — D22.9 NUMEROUS MOLES: ICD-10-CM

## 2023-03-16 LAB
ALBUMIN SERPL-MCNC: 4.2 G/DL (ref 3.5–5.2)
ALBUMIN/GLOB SERPL: 1.4 G/DL
ALP SERPL-CCNC: 84 U/L (ref 39–117)
ALT SERPL W P-5'-P-CCNC: 12 U/L (ref 1–33)
ANION GAP SERPL CALCULATED.3IONS-SCNC: 9.5 MMOL/L (ref 5–15)
AST SERPL-CCNC: 11 U/L (ref 1–32)
BASOPHILS # BLD AUTO: 0.01 10*3/MM3 (ref 0–0.2)
BASOPHILS NFR BLD AUTO: 0.2 % (ref 0–1.5)
BILIRUB SERPL-MCNC: 0.3 MG/DL (ref 0–1.2)
BUN SERPL-MCNC: 11 MG/DL (ref 6–20)
BUN/CREAT SERPL: 21.6 (ref 7–25)
CALCIUM SPEC-SCNC: 9.4 MG/DL (ref 8.6–10.5)
CHLORIDE SERPL-SCNC: 100 MMOL/L (ref 98–107)
CO2 SERPL-SCNC: 27.5 MMOL/L (ref 22–29)
CREAT SERPL-MCNC: 0.51 MG/DL (ref 0.57–1)
DEPRECATED RDW RBC AUTO: 46.5 FL (ref 37–54)
EGFRCR SERPLBLD CKD-EPI 2021: 109 ML/MIN/1.73
EOSINOPHIL # BLD AUTO: 0.1 10*3/MM3 (ref 0–0.4)
EOSINOPHIL NFR BLD AUTO: 2.2 % (ref 0.3–6.2)
ERYTHROCYTE [DISTWIDTH] IN BLOOD BY AUTOMATED COUNT: 14.4 % (ref 12.3–15.4)
ERYTHROCYTE [SEDIMENTATION RATE] IN BLOOD: 21 MM/HR (ref 0–30)
GLOBULIN UR ELPH-MCNC: 2.9 GM/DL
GLUCOSE SERPL-MCNC: 160 MG/DL (ref 65–99)
HCT VFR BLD AUTO: 41.2 % (ref 34–46.6)
HGB BLD-MCNC: 12.7 G/DL (ref 12–15.9)
HYPOCHROMIA BLD QL: NORMAL
IMM GRANULOCYTES # BLD AUTO: 0.01 10*3/MM3 (ref 0–0.05)
IMM GRANULOCYTES NFR BLD AUTO: 0.2 % (ref 0–0.5)
LYMPHOCYTES # BLD AUTO: 0.96 10*3/MM3 (ref 0.7–3.1)
LYMPHOCYTES NFR BLD AUTO: 21 % (ref 19.6–45.3)
MCH RBC QN AUTO: 27 PG (ref 26.6–33)
MCHC RBC AUTO-ENTMCNC: 30.8 G/DL (ref 31.5–35.7)
MCV RBC AUTO: 87.5 FL (ref 79–97)
MONOCYTES # BLD AUTO: 0.43 10*3/MM3 (ref 0.1–0.9)
MONOCYTES NFR BLD AUTO: 9.4 % (ref 5–12)
NEUTROPHILS NFR BLD AUTO: 3.06 10*3/MM3 (ref 1.7–7)
NEUTROPHILS NFR BLD AUTO: 67 % (ref 42.7–76)
PLAT MORPH BLD: NORMAL
PLATELET # BLD AUTO: 128 10*3/MM3 (ref 140–450)
PMV BLD AUTO: 10.9 FL (ref 6–12)
POTASSIUM SERPL-SCNC: 4.1 MMOL/L (ref 3.5–5.2)
PROT SERPL-MCNC: 7.1 G/DL (ref 6–8.5)
RBC # BLD AUTO: 4.71 10*6/MM3 (ref 3.77–5.28)
RBC MORPH BLD: NORMAL
SMALL PLATELETS BLD QL SMEAR: NORMAL
SODIUM SERPL-SCNC: 137 MMOL/L (ref 136–145)
WBC MORPH BLD: NORMAL
WBC NRBC COR # BLD: 4.57 10*3/MM3 (ref 3.4–10.8)

## 2023-03-16 PROCEDURE — 85025 COMPLETE CBC W/AUTO DIFF WBC: CPT

## 2023-03-16 PROCEDURE — 85007 BL SMEAR W/DIFF WBC COUNT: CPT

## 2023-03-16 PROCEDURE — 99214 OFFICE O/P EST MOD 30 MIN: CPT | Performed by: INTERNAL MEDICINE

## 2023-03-16 PROCEDURE — G0463 HOSPITAL OUTPT CLINIC VISIT: HCPCS | Performed by: INTERNAL MEDICINE

## 2023-03-16 PROCEDURE — 85652 RBC SED RATE AUTOMATED: CPT

## 2023-03-16 PROCEDURE — 80053 COMPREHEN METABOLIC PANEL: CPT

## 2023-03-16 PROCEDURE — 1125F AMNT PAIN NOTED PAIN PRSNT: CPT | Performed by: INTERNAL MEDICINE

## 2023-03-16 PROCEDURE — 36415 COLL VENOUS BLD VENIPUNCTURE: CPT

## 2023-03-16 RX ORDER — INSULIN LISPRO 100 [IU]/ML
INJECTION, SOLUTION INTRAVENOUS; SUBCUTANEOUS
COMMUNITY
Start: 2023-03-09

## 2023-03-16 NOTE — PROGRESS NOTES
Chief Complaint/Care Team   Neck Nodule Hx of Hodgkins Lymphoma     Vidal Luevano APRN Lowder, Bradley, APRN    History of Present Illness     Diagnosis: Hodgkin's Lymphoma Stage III diagnosed 2008    Current Treatment: Active surveillance    Previous Treatment:   -In 2008, s/p 3 cycles of ABVD (declined further treatment after 3 cycles, but was in remission)  -treated at [facility] after Right groin LN seen and B symptoms with 4 additional cycles of ABVD with last 2 cycles without Bleomycin  -Right inguinal LN PET Avid treated with RT from 2/2012-3/2012 with 30 Gy  -Active Surveillance from [id number], scanned records from Dr. Teodoro Mancuso ([facility]) from 11/2/2021 stated pt underwent PET CT scan which was negative for evidence of recurrence    Isha Llanes is a 57 y.o. female who presents to [facility]EMATOLOGY [facility]COLOGY for follow up regarding history of Hodgkin's Lymphoma stage III s/p treatment with ABVD diagnosed 2008.       Per scanned records from Dr. Shala Norris, (pt reported she was initially treated for a spider bite until lesion was biopsied and revealed Hodgkin's lympoma), treated in 2008     when she had nodular sclerosing Hodgkin's lymphoma stage III.  She received 3     cycles of ABVD.  She refused further treatment as she could not tolerate it.      She did go into remission with the 3 cycles which remained for a while.  From     February 2011 to October 2011 she was evaluated at [facility] when     she had right groin lymph node with B symptoms, but she reports receiving treatment in [place], [place] by Dr. Casey.  She received 4 additional     cycles of ABVD the last 2 without bleomycin.  She had a solitary lymph node residual in the right inguinal region which was PET avid and was treated with     radiation therapy February 2012 to March 2012 30 Gy.  She has been followed     since then with CAT scans and PET scans  which show small nonpathologically     enlarged lymph nodes.  She says she also got chemotherapy with Dr. Gaming before and had been following up with Dr. Paredes prior to both providers leaving their practice.  Most recently per scanned clinic note from Dr. Teodoro Mancuso (private heme-onc practice in [facility]), patient with PET/CT around November 2021 which did not reveal any evidence of cancer recurrence.         Patient has past medical history significant for anxiety, chronic ulcers in her lower extremities secondary to poorly controlled diabetes/chronic venous stasis, diabetes mellitus with use of continuous glucose monitor.  Patient's pain is currently managed with Dr. Boudreaux in [place] and she has a Dilaudid pain pump implanted in her right lower back.  She reports Dr. Boudreaux was planning to do a dye study her pain pump to assess whether or not the pump is functioning properly.     She presents today to establish care and for follow-up regarding her history of Hodgkin's lymphoma.  She today she is reporting pain in her right lower quadrant of her abdomen that has been constant, throbbing pain that has been present for at least the last 2 to 3 months.  She is underwent imaging of her abdomen pelvis which was negative for any new lesions suspicious for cancer.  She reports completing a course of antibiotics for her chronic bilateral lower extremity ulcers attributed to venous stasis.  He reports having palpable lymph nodes in her right cervical region and reports feeling fatigue.  Reports having approximately 20 pound weight loss over the past 2 months along with a poor appetite.  She is a current smoker of about 1 to 2 packs/day and she states she has been smoking since age 14.      Patient reports being wheelchair-bound for several months to years reports difficulty standing up and difficulty walking long distances secondary to weakness in her lower extremities.  Reports being able to complete her  ADLs, but does not drive.  Currently lives with her daughter and 3 grandchildren.       Interval history:  Patient had a follow-up regarding surveillance of her Hodgkin lymphoma history and she is status post chemotherapy radiation as listed above.  Today she reports noticing a lymph node under her left jaw that she reports has enlarged and slightly painful.  In addition she reports not taking her insulin for last 1 to 2 weeks secondary to her subcutaneous glucose monitoring system not pairing with her mobile device.  She reports chronic ulcers in her legs which she has been eval by wound care and reports receiving antibiotics which helped with those lesions.  She currently is on a water pill for chronic venous stasis in her lower extremities as well for which she is following with a cardiologist.  She denies any fever, night sweats, or reports of unintentional weight loss.  She also reports moles in her back seen by her daughter.  Patient reports need to speak with therapist regarding depression or another mood disorder.      Review of Systems   Constitutional: Positive for fatigue.   Hematological: Positive for adenopathy. Does not bruise/bleed easily.        Oncology/Hematology History    No history exists.       Objective     Vitals:    03/16/23 1026   BP: 107/56   Pulse: 86   Resp: 16   Temp: 98.1 °F (36.7 °C)   TempSrc: Temporal   SpO2: 95%   Weight: 75.3 kg (166 lb 0.1 oz)   PainSc:   7   PainLoc: Back     ECOG score: 0         PHQ-9 Total Score:         Physical Exam  Exam conducted with a chaperone present.   Constitutional:       General: She is not in acute distress.     Appearance: Normal appearance.      Comments: Patient in motorized wheelchair   HENT:      Head: Normocephalic and atraumatic.   Eyes:      Extraocular Movements: Extraocular movements intact.      Conjunctiva/sclera: Conjunctivae normal.   Cardiovascular:      Pulses: Normal pulses.      Heart sounds: Normal heart sounds.      Comments:  No axillary lymphadenopathy palpated  Pulmonary:      Effort: Pulmonary effort is normal.      Breath sounds: Normal breath sounds. No rhonchi or rales.   Abdominal:      General: Bowel sounds are normal. There is no distension.      Palpations: Abdomen is soft. There is no mass.      Tenderness: There is no abdominal tenderness.   Musculoskeletal:      Cervical back: Normal range of motion and neck supple.   Lymphadenopathy:      Cervical: Cervical adenopathy present.   Skin:     General: Skin is warm and dry.   Neurological:      Mental Status: She is oriented to person, place, and time.           Past Medical History     Past Medical History:   Diagnosis Date   • Anxiety     NO CURRENT MEDICATION   • Aortic stenosis, severe    • Arthritis    • Asthma    • Back pain    • Blood clotting disorder (HCC)    • Cancer associated pain    • Chronic low back pain with sciatica    • Chronic pain disorder    • Diabetes mellitus (HCC)     TYPE 2   • Dyspnea on effort    • GERD (gastroesophageal reflux disease)    • H/O lumpectomy    • H/O: hysterectomy    • Hiatal hernia    • History of degenerative disc disease    • History of kidney stones    • History of pulmonary embolus (PE)    • History of transfusion    • Hodgkin's lymphoma (HCC)    • Immobility    • Liver disease    • Lupus (HCC)    • Mitral valve prolapse    • Panic disorder    • Pelvic pain    • Peripheral neuropathy    • Personal history of DVT (deep vein thrombosis)    • Presence of intrathecal pump    • PTSD (post-traumatic stress disorder)    • Sacroiliac joint disease    • SI (sacroiliac) joint inflammation (HCC)    • Venous insufficiency      Current Outpatient Medications on File Prior to Visit   Medication Sig Dispense Refill   • acetaminophen (TYLENOL) 500 MG tablet Take 2-3 tablets by mouth Every 6 (Six) Hours As Needed for Mild Pain. 60 tablet 3   • albuterol sulfate HFA (ProAir HFA) 108 (90 Base) MCG/ACT inhaler Inhale 2 puffs Every 4 (Four) Hours As  "Needed for Wheezing. 8.5 g 2   • B-D UF III MINI PEN NEEDLES 31G X 5 MM misc      • bacitracin 500 UNIT/GM ointment Apply 1 application topically to the appropriate area as directed 2 (Two) Times a Day. 28 g 5   • bacitracin 500 UNIT/GM ointment Apply 1 application topically to the appropriate area as directed 2 (Two) Times a Day. 28 g 1   • Benzethonium Chloride (Dermal Wound Cleanser) 0.13 % liquid Spray on wounds daily as needed on both legs 236 mL 1   • clopidogrel (Plavix) 75 MG tablet Take 1 tablet by mouth Daily. 90 tablet 3   • Continuous Blood Gluc Sensor (Dexcom G6 Sensor) See Admin Instructions.     • Continuous Blood Gluc Transmit (Dexcom G6 Transmitter) misc See Admin Instructions.     • Continuous Glucose Monitor Sup kit 1 kit Continuous. One continuous glucose meter, and supplies needed. Give one month worth of supplies, with 3 refills. ICD-10: e11.9 1 kit 3   • Elastic Bandages & Supports (ACE Elastic Bandage 4\") misc 1 Device Daily. One on each leg, bilateral.   #24 = one month supply. 24 each 3   • Elastic Bandages & Supports (Medical Compression Socks) misc 1 Device Daily. bilateral compression socks 2 each 1   • EPINEPHrine (EPIPEN) 0.3 MG/0.3ML solution auto-injector injection TAKE AS DIRECTED INCASE OF ALLERGIC REACTION.     • FREESTYLE LITE test strip by Other route 4 (Four) Times a Day. Use to test blood sugar 4 times daily     • furosemide (LASIX) 40 MG tablet Take 1 tablet by mouth Daily. 90 tablet 1   • gabapentin (NEURONTIN) 300 MG capsule TAKE ONE CAPSULE BY MOUTH THREE TIMES DAILY (Patient taking differently: Take 1 capsule by mouth 4 (Four) Times a Day.) 270 capsule 1   • HYDROmorphone (DILAUDID) 1 mg/mL solution Infuse  into a venous catheter Dose Adjusted By Provider As Needed. PER PAIN PUMP     • Insulin Lispro, 1 Unit Dial, (HUMALOG) 100 UNIT/ML solution pen-injector USE 2-12 UNITS THREE TIMES DAILY PER sliding scale     • ipratropium-albuterol (DUO-NEB) 0.5-2.5 mg/3 ml nebulizer " Take 3 mL by nebulization 4 (Four) Times a Day. 360 mL 0   • Lancets (freestyle) lancets Use as directed. Check sugars tid E11.9 100 each 0   • Lantus SoloStar 100 UNIT/ML injection pen Inject 20 Units under the skin into the appropriate area as directed. Pt does not have this med     • methylcellulose (Citrucel) oral powder Take 2 application by mouth Daily. 850 g 1   • ondansetron ODT (ZOFRAN-ODT) 4 MG disintegrating tablet Place 1 tablet on the tongue Every 8 (Eight) Hours As Needed for Nausea or Vomiting. 15 tablet 0   • sodium chloride (NS) 0.9 % irrigation Irrigate with 250 mL to the affected area as directed by provider Daily. Cleanse wound daily with sterile water.     • triamcinolone (KENALOG) 0.1 % ointment Apply  topically to the appropriate area as directed 2 (Two) Times a Day. 30 g 0   • triamcinolone (KENALOG) 0.1 % ointment Apply 1 application topically to the appropriate area as directed 2 (Two) Times a Day. 30 g 0     Current Facility-Administered Medications on File Prior to Visit   Medication Dose Route Frequency Provider Last Rate Last Admin   • heparin injection 500 Units  5 mL Intravenous PRN Vidal Luevano APRN       • sodium chloride 0.9 % flush 10 mL  10 mL Intravenous Q12H Vidal Luevano APRN       • sodium chloride 0.9 % flush 10 mL  10 mL Intravenous PRN Vidal Luevano APRN       • sodium chloride 0.9 % flush 20 mL  20 mL Intravenous PRN Vidal Luevano APRN       • sterile water irrigation solution 500 mL  500 mL Irrigation Once Damari Cornejo, [id number]          Allergies   Allergen Reactions   • Amoxicillin Shortness Of Breath   • Ceclor [Cefaclor] Shortness Of Breath   • Penicillins Shortness Of Breath   • Sulfa Antibiotics Rash   • Valium [Diazepam] Mental Status Change   • Ambien [Zolpidem] Unknown - Low Severity   • Aspirin GI Intolerance   • Ativan [Lorazepam] Unknown - Low Severity   • Benadryl [Diphenhydramine] Unknown - Low Severity   • Biaxin [Clarithromycin] Unknown - Low  Severity   • Cefazolin Unknown - Low Severity   • Cephalexin Unknown - Low Severity   • Cephalosporins Unknown - Low Severity   • Clindamycin Unknown - Low Severity   • Compazine [Prochlorperazine Edisylate] Rash   • Contrast Dye (Echo Or Unknown Ct/Mr) Other (See Comments)     Caused pain in her arm   • Doxycycline Rash   • Eggs Or Egg-Derived Products Unknown - Low Severity   • Nsaids Unknown - Low Severity   • Phenergan [Promethazine Hcl] Rash   • Promethazine Unknown - Low Severity   • Vancomycin Itching     Past Surgical History:   Procedure Laterality Date   • BACK SURGERY      SPINAL FUSION    • BLADDER REPAIR     • CARDIAC CATHETERIZATION N/A 3/6/2019    Procedure: Valvuloplasty;  Surgeon: Wayne Johnson MD;  Location: [facility] CATH INVASIVE LOCATION;  Service: Cardiology   • CARDIAC VALVE REPLACEMENT  2021   • CHOLECYSTECTOMY     • COLONOSCOPY N/A 12/29/2021    Procedure: COLONOSCOPY;  Surgeon: Karine Orlando MD;  Location: [facility] ENDOSCOPY;  Service: Gastroenterology;  Laterality: N/A;  POOR PREP   • COLONOSCOPY N/A 4/13/2022    Procedure: COLONOSCOPY WITH POLYPECTOMY;  Surgeon: Karine Orlando MD;  Location: [facility] ENDOSCOPY;  Service: Gastroenterology;  Laterality: N/A;  COLON POLYP, HEMORRHOIDS, POOR PREP   • ENDOSCOPY N/A 12/29/2021    Procedure: ESOPHAGOGASTRODUODENOSCOPY;  Surgeon: Karine Orlando MD;  Location: [facility] ENDOSCOPY;  Service: Gastroenterology;  Laterality: N/A;  ESOPHAGITIS, GASTRITIS, HIATAL HERNIA   • HYSTERECTOMY     • LYMPHADENECTOMY     • PACEMAKER IMPLANTATION     • PAIN PUMP INSERTION/REVISION N/A 10/18/2019    Procedure: PAIN PUMP INSERTION [facility] [place] 10[facility]18[facility]19 [place];  Surgeon: Horace Rios MD;  Location: [facility] OR;  Service: Pain Management   • PAIN PUMP INSERTION/REVISION N/A 11/23/2020    Procedure: pain pump removal;  Surgeon: Horace Rios MD;  Location: [facility] OR;  Service: Pain Management;  Laterality: N/A;   •  TONSILLECTOMY     • TUBAL ABDOMINAL LIGATION     • TUMOR REMOVAL       Social History     Socioeconomic History   • Marital status:    Tobacco Use   • Smoking status: Every Day     Packs/day: 2.00     Years: 41.00     Pack years: 82.00     Types: Cigarettes   • Smokeless tobacco: Never   Vaping Use   • Vaping Use: Never used   Substance and Sexual Activity   • Alcohol use: No   • Drug use: No   • Sexual activity: Not Currently     Family History   Problem Relation Age of Onset   • Anxiety disorder Mother    • Depression Sister    • Bipolar disorder Sister    • Pancreatic cancer Sister    • ADD / ADHD Brother    • Pancreatic cancer Paternal Grandmother    • ADD / ADHD Granddaughter    • ADD / ADHD Grandson    • ADD / ADHD Nephew    • Malig Hyperthermia Neg Hx        Results     Result Review   The following data was reviewed by: Jeaneth Haywood MD on 03/16/2023:  Lab Results   Component Value Date    HGB 12.7 03/16/2023    HCT 41.2 03/16/2023    MCV 87.5 03/16/2023     (L) 03/16/2023    WBC 4.57 03/16/2023    NEUTROABS 3.06 03/16/2023    LYMPHSABS 0.96 03/16/2023    MONOSABS 0.43 03/16/2023    EOSABS 0.10 03/16/2023    BASOSABS 0.01 03/16/2023     Lab Results   Component Value Date    GLUCOSE 160 (H) 03/16/2023    BUN 11 03/16/2023    CREATININE 0.51 (L) 03/16/2023     03/16/2023    K 4.1 03/16/2023     03/16/2023    CO2 27.5 03/16/2023    CALCIUM 9.4 03/16/2023    PROTEINTOT 7.1 03/16/2023    ALBUMIN 4.2 03/16/2023    BILITOT 0.3 03/16/2023    ALKPHOS 84 03/16/2023    AST 11 03/16/2023    ALT 12 03/16/2023     Lab Results   Component Value Date    MG 1.9 09/05/2022    PHOS 3.9 06/07/2021    TSH 1.540 01/12/2023           No radiology results for the last day       Assessment & Plan     Diagnoses and all orders for this visit:    1. History of Hodgkin's lymphoma (Primary)  -     Ambulatory Referral to Behavioral Health  -     NM PET/CT Skull Base to Mid Thigh; Future  -     CBC &  Differential; Future  -     Comprehensive Metabolic Panel; Future  -     Sedimentation Rate; Future    2. Depression, unspecified depression type  -     Ambulatory Referral to Behavioral Health    3. Numerous moles  -     Ambulatory Referral to Dermatology        Isha Llanes is a 57 y.o. female who presents to [facility]AT[facility] ONCOLOGY for history of Hodgkin's lymphoma status posttreatment in 2012 as listed above.  Patient currently under active surveillance.  She reports new enlarging lymphadenopathy under her left jaw.  Will obtain PET/CT to further evaluate.  Discussed with patient my recommendation for her to undergo biopsy of any suspicious lesion seen on PET/CT.  In addition, will obtain routine surveillance lab work with CBC, CMP, ESR.  Plan for patient follow-up with me in 1 month to discuss lab results.    Patient reports inability to see therapist she was previously referred to by her PCP thus placed referral to behavioral health here at [facility] for management of her depression or other mood disorder.    Patient report she has not taken her insulin or other diabetic medications for over a week secondary to her subcutaneous Dexcom monitor not pairing with her mobile device.  Patient with appointment with her primary care provider tomorrow strongly encouraged her to keep this appointment to follow-up management of her diabetes.  I am obtaining CMP today to assess for elevated blood glucose levels and will refer patient to ER if she has  dangerously high blood sugar on lab work today.    Recommend smoking cessation as well.    In addition patient reports new moles on her back thus referred her to dermatology for further evaluation treatment.    Plan for patient follow-up with me in 1 month to discuss results of PET CT scan as well as lab work.      Please note that portions of this note were completed with a voice recognition program.    Electronically signed by Jeaneth  MD Yobany, 03/16/23, 1:05 PM EDT.      Follow Up     I spent 30 minutes caring for Isha on this date of service. This time includes time spent by me in the following activities:preparing for the visit, reviewing tests, obtaining and/or reviewing a separately obtained history, performing a medically appropriate examination and/or evaluation , counseling and educating the patient/family/caregiver, ordering medications, tests, or procedures, documenting information in the medical record and care coordination.    This is an acute or chronic illness that poses a threat to life or bodily function. The above treatment plan involves a high risk of complications and/or mortality of patient management.    The patient was seen and examined. Work by the provider also included review and/or ordering of lab tests, review and/or ordering of radiology tests, review and/or ordering of medicine tests, discussion with other physicians or providers, independent review of data, obtaining old records, review/summation of old records, and/or other review.    I have reviewed the family history, social history, and past medical history for this patient. Previous information and data has been reviewed and updated as needed. I have reviewed and verified the chief complaint, history, and other documentation. The patient was interviewed and examined in the clinic and the chart reviewed. The previous observations, recommendations, and conclusions were reviewed including those of other providers.     The plan was discussed with the patient and/or family. The patient was given time to ask questions and these questions were answered. At the conclusion of their visit they had no additional questions or concerns and all questions were answered to their satisfaction.    Patient was given instructions and counseling regarding her condition or for health maintenance advice. Please see specific information pulled into the AVS if appropriate.

## 2023-03-17 ENCOUNTER — OFFICE VISIT (OUTPATIENT)
Dept: FAMILY MEDICINE CLINIC | Facility: CLINIC | Age: 58
End: 2023-03-17
Payer: COMMERCIAL

## 2023-03-17 ENCOUNTER — TELEPHONE (OUTPATIENT)
Dept: FAMILY MEDICINE CLINIC | Facility: CLINIC | Age: 58
End: 2023-03-17

## 2023-03-17 VITALS
WEIGHT: 163 LBS | SYSTOLIC BLOOD PRESSURE: 116 MMHG | BODY MASS INDEX: 26.2 KG/M2 | TEMPERATURE: 98 F | DIASTOLIC BLOOD PRESSURE: 66 MMHG | OXYGEN SATURATION: 94 % | HEIGHT: 66 IN | HEART RATE: 85 BPM

## 2023-03-17 DIAGNOSIS — L97.229 VENOUS STASIS ULCER OF LEFT CALF, UNSPECIFIED ULCER STAGE, UNSPECIFIED WHETHER VARICOSE VEINS PRESENT: ICD-10-CM

## 2023-03-17 DIAGNOSIS — M25.551 RIGHT HIP PAIN: ICD-10-CM

## 2023-03-17 DIAGNOSIS — J30.2 SEASONAL ALLERGIC RHINITIS, UNSPECIFIED TRIGGER: ICD-10-CM

## 2023-03-17 DIAGNOSIS — I83.012 VENOUS STASIS ULCER OF RIGHT CALF, UNSPECIFIED ULCER STAGE, UNSPECIFIED WHETHER VARICOSE VEINS PRESENT: ICD-10-CM

## 2023-03-17 DIAGNOSIS — Z79.4 TYPE 2 DIABETES MELLITUS WITH HYPERGLYCEMIA, WITH LONG-TERM CURRENT USE OF INSULIN: Primary | ICD-10-CM

## 2023-03-17 DIAGNOSIS — E11.65 TYPE 2 DIABETES MELLITUS WITH HYPERGLYCEMIA, WITH LONG-TERM CURRENT USE OF INSULIN: Primary | ICD-10-CM

## 2023-03-17 DIAGNOSIS — L97.219 VENOUS STASIS ULCER OF RIGHT CALF, UNSPECIFIED ULCER STAGE, UNSPECIFIED WHETHER VARICOSE VEINS PRESENT: ICD-10-CM

## 2023-03-17 DIAGNOSIS — I83.022 VENOUS STASIS ULCER OF LEFT CALF, UNSPECIFIED ULCER STAGE, UNSPECIFIED WHETHER VARICOSE VEINS PRESENT: ICD-10-CM

## 2023-03-17 PROCEDURE — 99214 OFFICE O/P EST MOD 30 MIN: CPT | Performed by: NURSE PRACTITIONER

## 2023-03-17 PROCEDURE — 3044F HG A1C LEVEL LT 7.0%: CPT | Performed by: NURSE PRACTITIONER

## 2023-03-17 RX ORDER — CETIRIZINE HYDROCHLORIDE 10 MG/1
10 TABLET ORAL DAILY
Qty: 90 TABLET | Refills: 1 | Status: SHIPPED | OUTPATIENT
Start: 2023-03-17

## 2023-03-17 RX ORDER — INSULIN GLARGINE 100 [IU]/ML
10 INJECTION, SOLUTION SUBCUTANEOUS DAILY
Qty: 6 ML | Refills: 2 | Status: SHIPPED | OUTPATIENT
Start: 2023-03-17

## 2023-03-17 NOTE — PROGRESS NOTES
"Chief Complaint  Right hip pain, diabetes type 2, nasal congestion    Subjective         Isha Llanes presents to [facility]  HPI   Presents today for follow-up on right hip pain, type 2 diabetes, nasal congestion.  She has been experiencing right hip pain for the past several years.  Pain worse over the past year.  She reports her right hip is popping out.  She saw Dr. Schneider in December who put in a consultation for her to see an orthopedic specialist in [place].  She states she has not been contacted.    She has not been taking her insulin.  She states she had problems with her Dexcom continuous glucometer.  She brought in her supplies for Dexcom today in office.  Denies polyuria polydipsia.    She has been experiencing nasal congestion.  Reports she has a sore throat.  She has been tested multiple times and been negative for strep throat.  Last month she did have treatment with azithromycin.  Nasal swab from last month showed streptococcal pneumonia in the nasal with H. influenzae.    Social History     Socioeconomic History   • Marital status:    Tobacco Use   • Smoking status: Every Day     Packs/day: 2.00     Years: 41.00     Pack years: 82.00     Types: Cigarettes   • Smokeless tobacco: Never   Vaping Use   • Vaping Use: Never used   Substance and Sexual Activity   • Alcohol use: No   • Drug use: No   • Sexual activity: Not Currently        Objective     Vitals:    03/17/23 1143   BP: 116/66   BP Location: Left arm   Patient Position: Sitting   Cuff Size: Adult   Pulse: 85   Temp: 98 °F (36.7 °C)   TempSrc: Oral   SpO2: 94%   Weight: 73.9 kg (163 lb)   Height: 167.6 cm (66\")        Body mass index is 26.31 kg/m².    Wt Readings from Last 3 Encounters:   03/17/23 73.9 kg (163 lb)   03/16/23 75.3 kg (166 lb 0.1 oz)   03/14/23 75.3 kg (166 lb)       BP Readings from Last 3 Encounters:   03/17/23 116/66   03/16/23 107/56   03/14/23 111/78         Physical " Exam  Vitals reviewed.   Constitutional:       Appearance: Normal appearance.      Comments: Wheel chair   HENT:      Head: Normocephalic and atraumatic.      Right Ear: External ear normal.      Left Ear: External ear normal.      Mouth/Throat:      Pharynx: No oropharyngeal exudate.   Eyes:      Conjunctiva/sclera: Conjunctivae normal.      Pupils: Pupils are equal, round, and reactive to light.   Cardiovascular:      Rate and Rhythm: Normal rate and regular rhythm.      Heart sounds: No murmur heard.    No friction rub. No gallop.   Pulmonary:      Effort: Pulmonary effort is normal.      Breath sounds: Decreased breath sounds present. No wheezing or rhonchi.   Abdominal:      General: Bowel sounds are normal. There is no distension.      Palpations: Abdomen is soft.      Tenderness: There is no abdominal tenderness.   Skin:     General: Skin is warm and dry.      Comments: Legs are Ace wrapped   Neurological:      Mental Status: She is alert and oriented to person, place, and time.   Psychiatric:         Mood and Affect: Mood and affect normal.         Behavior: Behavior normal.         Thought Content: Thought content normal.         Judgment: Judgment normal.          Result Review :   The following data was reviewed by: LUZ Zamora on 03/17/2023:      Procedures    Assessment and Plan   Diagnoses and all orders for this visit:    1. Type 2 diabetes mellitus with hyperglycemia, with long-term current use of insulin ([facility]) (Primary)  -     Lantus SoloStar 100 UNIT/ML injection pen; Inject 10 Units under the skin into the appropriate area as directed Daily. Pt does not have this med  Dispense: 6 mL; Refill: 2    2. Seasonal allergic rhinitis, unspecified trigger    3. Venous stasis ulcer of right calf, unspecified ulcer stage, unspecified whether varicose veins present (HCC)  -     Miscellaneous DME    4. Venous stasis ulcer of left calf, unspecified ulcer stage, unspecified whether varicose veins  present (HCC)  -     Miscellaneous DME    5. Right hip pain    Other orders  -     cetirizine (zyrTEC) 10 MG tablet; Take 1 tablet by mouth Daily.  Dispense: 90 tablet; Refill: 1    We will start her back on Lantus 10 units daily.  Monitor blood sugar with Dexcom.  For the seasonal allergies discussed starting Zyrtec 10 mg daily.  Discussed nasal rinsing.  Patient declines Flonase and as Astelin.  For the lower extremity venous stasis we will order SCDs.  Use at nighttime.  Continually wrapping legs with Ace wrap.  Patient refuses to return back to wound care.  Dr. Schneider put in consult patient to a specialist in [place].  We will follow-up on the referral for her to get an appointment.            Follow Up   Return in about 4 weeks (around 4/14/2023), or if symptoms worsen or fail to improve, for Next scheduled follow up.  Patient was given instructions and counseling regarding her condition or for health maintenance advice. Please see specific information pulled into the AVS if appropriate.     Please note that portions of this note were completed with a voice recognition program.

## 2023-03-17 NOTE — TELEPHONE ENCOUNTER
Called [facility] for DME sequential compression device information and they stated to try and call a company called [employer]. Faxed over order to [phone] to see if they can order for patient. Waiting on fax confirmation.

## 2023-03-20 ENCOUNTER — TELEPHONE (OUTPATIENT)
Dept: ONCOLOGY | Facility: HOSPITAL | Age: 58
End: 2023-03-20
Payer: COMMERCIAL

## 2023-03-20 DIAGNOSIS — L30.9 ECZEMA, UNSPECIFIED TYPE: ICD-10-CM

## 2023-03-20 RX ORDER — ONDANSETRON 4 MG/1
4 TABLET, ORALLY DISINTEGRATING ORAL EVERY 8 HOURS PRN
Qty: 15 TABLET | Refills: 0 | Status: SHIPPED | OUTPATIENT
Start: 2023-03-20 | End: 2023-03-29 | Stop reason: SDUPTHER

## 2023-03-20 NOTE — TELEPHONE ENCOUNTER
"Caller: Isha Llanes    Relationship: Self    Best call back number: [phone]    Requested Prescriptions:   - ZOFRAN  - OINTMENT  - BLOOD SUGAR MONTIOR WITH TESTING SUPPLIES  \"THING FOR LEGS THAT MASSAGES THEM\"    Pharmacy where request should be sent: [facility] - [place], [place] - [address] [facility] - [phone]  - [phone]      Additional details provided by patient: PATIENT IS NEEDING THESE 4 ITEMS CALLED IN. SHE STATED THAT CASSIE WAS GOING TO SEND SOMETHING FOR HER LEGS THAT ARE SUPPOSED TO MASSAGE THEM AS WELL.     Does the patient have less than a 3 day supply:  [x] Yes  [] No    Would you like a call back once the refill request has been completed: [x] Yes [] No    If the office needs to give you a call back, can they leave a voicemail: [x] Yes [] No    Maximilian Velazquez Rep   03/20/23 09:57 EDT       "

## 2023-03-20 NOTE — TELEPHONE ENCOUNTER
Attempted to call patient to let her know Kiko Luevano is on vacation until 03/29/2023 but patient's voicemail box is full. I can send over refills of the zofran and triamcinolone ointment but not the other things until he gets back. Another MA sent over the order for the equipment for her legs and we have not heard back yet.

## 2023-03-21 ENCOUNTER — TELEPHONE (OUTPATIENT)
Dept: ONCOLOGY | Facility: HOSPITAL | Age: 58
End: 2023-03-21
Payer: COMMERCIAL

## 2023-03-21 ENCOUNTER — PRIOR AUTHORIZATION (OUTPATIENT)
Dept: FAMILY MEDICINE CLINIC | Facility: CLINIC | Age: 58
End: 2023-03-21
Payer: COMMERCIAL

## 2023-03-21 ENCOUNTER — TELEPHONE (OUTPATIENT)
Dept: FAMILY MEDICINE CLINIC | Facility: CLINIC | Age: 58
End: 2023-03-21

## 2023-03-21 NOTE — TELEPHONE ENCOUNTER
----- Message from Jeaneth Haywood MD sent at 3/19/2023 10:18 PM EDT -----  Please let pt know that her blood glucose level was fortunately not elevated when checked after her clinic appointment with me. The cancer marker ESR was not elevated which is commonly elevated in active Hodgkin’s Lymphoma. Her platelet count was a little low, will need to recheck again when I see her back in clinic. We  will see what the PET scan shows regarding her LNs. Thanks!

## 2023-03-21 NOTE — TELEPHONE ENCOUNTER
Caller: Isha Llanes    Relationship: Self    Best call back number: [phone]    What medication are you requesting: STEROID PACK    What are your current symptoms: LUPUS FLARE UP, ACHING    Have you had these symptoms before:    [x] Yes  [] No    Have you been treated for these symptoms before:   [x] Yes  [] No    If a prescription is needed, what is your preferred pharmacy and phone number: [facility] - [place], [place] - [address] [facility] [phone] [facility] [phone] FX     Additional notes: PATIENT WOULD LIKE A STEROID PACK CALLED IN FOR HER LUPUS. SHE IS ACHING AND THE ACHING HAS GOTTEN WORSE SINCE LAST NIGHT.

## 2023-03-21 NOTE — TELEPHONE ENCOUNTER
Spoke with patient and let her know PCP is on vacation and that it may be a few days before Kiko Luevano sees this message. If she is in pain, she may go to urgent care. Patient verbalized understanding.

## 2023-03-21 NOTE — TELEPHONE ENCOUNTER
Spoke with patient advised that her ESR was not elevated, glucose was not elevated, Advised that her platelet count was low and that we will recheck it when she is back at the clinic. Patient did ask for confirmation on the date of her PET scan, I was not able to see the requested information and will have scheduling call the patient with that information.  Instructed to maintain all follow up visits and to contact the office with any questions or concerns. Patient verbalized understanding of all information discussed.

## 2023-03-21 NOTE — TELEPHONE ENCOUNTER
Key: [id number] - PA Case ID: [id number] - Rx #: [id number] help? Call us at [phone]      Outcome  Approvedon March 17  The request has been approved. The authorization is effective for a maximum of 12 fills from 03/17/2023 to 03/16/2024, as long as the member is enrolled in their current health plan. The request was approved as submitted. A written notification letter will follow with additional details.

## 2023-03-27 ENCOUNTER — TELEPHONE (OUTPATIENT)
Dept: ONCOLOGY | Facility: HOSPITAL | Age: 58
End: 2023-03-27

## 2023-03-27 NOTE — TELEPHONE ENCOUNTER
Caller: LUZMARIA    Relationship: [facility]    Best call back number: [phone]  [phone]    What is the best time to reach you: ASAP    Who are you requesting to speak with (clinical staff, provider,  specific staff member): CLINICAL    What was the call regarding: CALLING TO GET REVISED PET SCAN ORDER WITH THE DIAGNOSIS ON IT      Do you require a callback: YES

## 2023-03-27 NOTE — TELEPHONE ENCOUNTER
Caller: LUZMARIA    Relationship: [facility]    Best call back number: [phone]    What is the best time to reach you: ANYTIME    Who are you requesting to speak with (clinical staff, provider, specific staff member): ANA M    What was the call regarding: LUZMARIA WITH [employer] CALLING BACK FOR ANA M WITH DR CASTELLANOS'S OFFICE - LUZMARIA'S DIRECT LINE -[phone].     Do you require a callback: YES

## 2023-03-28 RX ORDER — FUROSEMIDE 40 MG/1
TABLET ORAL
Qty: 90 TABLET | Refills: 1 | Status: SHIPPED | OUTPATIENT
Start: 2023-03-28

## 2023-03-29 DIAGNOSIS — J44.9 CHRONIC OBSTRUCTIVE PULMONARY DISEASE, UNSPECIFIED COPD TYPE: ICD-10-CM

## 2023-03-29 RX ORDER — ALBUTEROL SULFATE 90 UG/1
AEROSOL, METERED RESPIRATORY (INHALATION)
Qty: 8.5 G | Refills: 2 | Status: SHIPPED | OUTPATIENT
Start: 2023-03-29

## 2023-03-29 RX ORDER — ONDANSETRON 4 MG/1
TABLET, FILM COATED ORAL
Qty: 15 TABLET | Refills: 0 | Status: SHIPPED | OUTPATIENT
Start: 2023-03-29

## 2023-03-29 RX ORDER — METHYLPREDNISOLONE 4 MG/1
TABLET ORAL
Qty: 21 TABLET | Refills: 0 | Status: SHIPPED | OUTPATIENT
Start: 2023-03-29

## 2023-03-30 NOTE — TELEPHONE ENCOUNTER
Spoke with patient and let her know her medication was sent to her pharmacy. Patient verbalized understanding.

## 2023-04-05 ENCOUNTER — TELEPHONE (OUTPATIENT)
Dept: FAMILY MEDICINE CLINIC | Facility: CLINIC | Age: 58
End: 2023-04-05
Payer: COMMERCIAL

## 2023-04-05 NOTE — TELEPHONE ENCOUNTER
Spoke to patient and let her know that she would need to be seen in office to be accessed. Recommended patient to go to an Urgent Care or do a mychart video visit with a provider.She stated she was going to try and do an emergency mychart visit.

## 2023-04-05 NOTE — TELEPHONE ENCOUNTER
Caller: Isha Llanes    Relationship: Self    Best call back number: [phone]    What medication are you requesting: SOMETHING FOR LISTED SYMPTOMS    What are your current symptoms: SORE THROAT, VAGINAL ODOR    How long have you been experiencing symptoms: 1 WEEK    Have you had these symptoms before:    [x] Yes  [] No    Have you been treated for these symptoms before:   [x] Yes  [] No    If a prescription is needed, what is your preferred pharmacy and phone number:  [facility] - [place], [place] - [address] [facility] - [phone] [facility] [phone]   [phone]  Associate Signed Orders    Additional notes:PATIENT STATES THAT HER GRANDDAUGHTER HAS STRAP THROAT.     PATIENT STATES THAT SHE WAS TAKEN A STEROID BUT IT IS GONE AND SHE IS NEEDING MORE MEDICATION.    PATIENT STATES THAT SHE IS HAVING SLIGHT ITCHING ON HER LEGS

## 2023-04-10 ENCOUNTER — TELEPHONE (OUTPATIENT)
Dept: FAMILY MEDICINE CLINIC | Facility: CLINIC | Age: 58
End: 2023-04-10
Payer: COMMERCIAL

## 2023-04-10 NOTE — TELEPHONE ENCOUNTER
Attempted to call patient to reschedule their appt.   Received a busy signal with both phone numbers provided

## 2023-04-10 NOTE — TELEPHONE ENCOUNTER
Caller: Isha Llanes    Relationship: Self    Best call back number: [phone]    What was the call regarding: PATIENT STATS SHE NEEDS A SOONER APPOINTMENT THAN 5/19 BECAUSE SHE IS HAVING SOME ISSUES WITH HER LEGS AND HER PORT.    Do you require a callback: YES

## 2023-04-11 ENCOUNTER — TELEPHONE (OUTPATIENT)
Dept: FAMILY MEDICINE CLINIC | Facility: CLINIC | Age: 58
End: 2023-04-11

## 2023-04-11 ENCOUNTER — TELEPHONE (OUTPATIENT)
Dept: FAMILY MEDICINE CLINIC | Facility: CLINIC | Age: 58
End: 2023-04-11
Payer: COMMERCIAL

## 2023-04-11 NOTE — TELEPHONE ENCOUNTER
Caller: Isha Llanes    Relationship: Self    Best call back number: [phone]    What medication are you requesting: GLUCOSE METER, LANCETS, TEST STRIPS    What are your current symptoms: TO CHECK BLOOD SUGAR    How long have you been experiencing symptoms: N/A    Have you had these symptoms before:    [x] Yes  [] No    Have you been treated for these symptoms before:   [x] Yes  [] No    If a prescription is needed, what is your preferred pharmacy and phone number: [facility] - [place], [place] - [address] [facility] - [phone] [facility] [phone] FX     Additional notes:PATIENT STATED THAT SHE HAD A METER ON HER ARM AND IT KEEPS FALLING OFF, PATIENT IS REQUESTING A GLUCOSE METER INSTEAD.

## 2023-04-11 NOTE — TELEPHONE ENCOUNTER
Caller: Isha Llanes    Relationship to patient: Self    Best call back number: [phone]    Chief complaint: WEAKNESS, NO ENERGY, SORES ON LEGS    Type of visit: OFFICE VISIT    Requested date: AS SOON AS POSSIBLE    If rescheduling, when is the original appointment: 5.19.23     Additional notes:PATIENT STATED THAT SHE HAS AN APPOINTMENT ON 5.19.23 AND WOULD LIKE TO BE SEEN EARLIER IF POSSIBLE FOR THE SYMPTOMS LISTED ABOVE.

## 2023-04-12 ENCOUNTER — TELEPHONE (OUTPATIENT)
Dept: FAMILY MEDICINE CLINIC | Facility: CLINIC | Age: 58
End: 2023-04-12

## 2023-04-12 RX ORDER — LANCETS 30 GAUGE
1 EACH MISCELLANEOUS 3 TIMES DAILY
Qty: 100 EACH | Refills: 12 | Status: SHIPPED | OUTPATIENT
Start: 2023-04-12

## 2023-04-12 RX ORDER — BLOOD-GLUCOSE METER
1 KIT MISCELLANEOUS AS NEEDED
Qty: 1 EACH | Refills: 0 | Status: SHIPPED | OUTPATIENT
Start: 2023-04-12

## 2023-04-12 NOTE — TELEPHONE ENCOUNTER
"Patient stated her \"skin is really oily and the thing won't stay on my arm\" when I asked her about her Dexcom. She would like to go to a regular glucometer, test strips, and lancets.  "

## 2023-04-12 NOTE — TELEPHONE ENCOUNTER
Caller: KAMERON    Relationship: Other    Best call back number: [phone]     What form or medical record are you requesting:DIAGNOSES ADDENDUM     [facility]    How would you like to receive the form or medical records (pick-up, mail, fax): FAX  If fax, what is the fax number: [phone]    Timeframe paperwork needed: ASAP    Additional notes: KAMERON FROM [facility] STATED SHE IS TRYING TO GET A LYMPHEDEMA PUMP FOR THE PATIENT. SHE STATED THEY RECEIVED THE NOTES BUT NOT A DIAGNOSES CODE OF LYMPHEDEMA. THEY NEED A DIAGNOSIS ADDENDUM FAXED ASAP INCLUDING THE DIAGNOSES CODE FOR LYMPHEDEMA

## 2023-04-12 NOTE — TELEPHONE ENCOUNTER
Called patient, I offered multiple times and days and she stated she has to talk with her daughter and see about TACK when she can make an appt. I stated I would call back later or she can call us to schedule.

## 2023-04-13 ENCOUNTER — TELEPHONE (OUTPATIENT)
Dept: CASE MANAGEMENT | Facility: OTHER | Age: 58
End: 2023-04-13
Payer: COMMERCIAL

## 2023-04-13 NOTE — TELEPHONE ENCOUNTER
60 Day Chart Review     Patient has been UTR - Last Case Management Contact on 11/28/22 with JONATHON Nelson.     No ER or UC Visits since Last Contact.     Continues to Follow Up with PCP for needs.     Sees Heme/Onc for active surveillance of Hodgkin's Lymphoma.     PET Scan today 4/13/23.         Continue in Monitoring d/t loss of contact.

## 2023-04-14 ENCOUNTER — TELEPHONE (OUTPATIENT)
Dept: FAMILY MEDICINE CLINIC | Facility: CLINIC | Age: 58
End: 2023-04-14
Payer: COMMERCIAL

## 2023-04-14 ENCOUNTER — TELEPHONE (OUTPATIENT)
Dept: FAMILY MEDICINE CLINIC | Facility: CLINIC | Age: 58
End: 2023-04-14

## 2023-04-14 NOTE — TELEPHONE ENCOUNTER
Caller: Isha Llanes    Relationship: Self    Best call back number: [phone]    What is the medical concern/diagnosis:     HIP PAIN, LUPUS AND ARTHRITIS       What specialty or service is being requested:      ORTHOPEDIC, RHEUMATOLOGIST    What is the provider, practice or medical service name:     [facility]       Any additional details:       THE PATIENT IS REQUESTING A REFERRAL TO SPECIALISTS FOR HER HIP PAIN,  LUPUS AND ARTHRITIS.      PLEASE CALL TO DISCUSS IF THERE ARE ANY QUESTIONS

## 2023-04-14 NOTE — TELEPHONE ENCOUNTER
Caller: Isha Llanes    Relationship: Self    Best call back number: [phone]    What orders are you requesting (i.e. lab or imaging): HOME HEALTH     In what timeframe would the patient need to come in: ASAP           Additional notes:         THE PATIENT IS REQUESTING AN ORDER FOR HOME HEALTH. SHE SAID SHE HAS SPOKE TO PCP CASSIE ABOUT IT BUT DIDN'T HAVE IT DONE BECAUSE SHE WAS MOVING

## 2023-04-18 DIAGNOSIS — E11.65 TYPE 2 DIABETES MELLITUS WITH HYPERGLYCEMIA, WITH LONG-TERM CURRENT USE OF INSULIN: ICD-10-CM

## 2023-04-18 DIAGNOSIS — Z79.4 TYPE 2 DIABETES MELLITUS WITH HYPERGLYCEMIA, WITH LONG-TERM CURRENT USE OF INSULIN: ICD-10-CM

## 2023-04-18 DIAGNOSIS — J44.9 CHRONIC OBSTRUCTIVE PULMONARY DISEASE, UNSPECIFIED COPD TYPE: ICD-10-CM

## 2023-04-18 DIAGNOSIS — I83.012 VENOUS STASIS ULCER OF RIGHT CALF, UNSPECIFIED ULCER STAGE, UNSPECIFIED WHETHER VARICOSE VEINS PRESENT: Primary | ICD-10-CM

## 2023-04-18 DIAGNOSIS — L97.229 VENOUS STASIS ULCER OF LEFT CALF, UNSPECIFIED ULCER STAGE, UNSPECIFIED WHETHER VARICOSE VEINS PRESENT: ICD-10-CM

## 2023-04-18 DIAGNOSIS — I83.022 VENOUS STASIS ULCER OF LEFT CALF, UNSPECIFIED ULCER STAGE, UNSPECIFIED WHETHER VARICOSE VEINS PRESENT: ICD-10-CM

## 2023-04-18 DIAGNOSIS — L97.219 VENOUS STASIS ULCER OF RIGHT CALF, UNSPECIFIED ULCER STAGE, UNSPECIFIED WHETHER VARICOSE VEINS PRESENT: Primary | ICD-10-CM

## 2023-04-18 DIAGNOSIS — I89.0 LYMPHEDEMA OF BOTH LOWER EXTREMITIES: ICD-10-CM

## 2023-04-19 NOTE — TELEPHONE ENCOUNTER
"Spoke with patient. I looked in her chart. The referral and documents were sent to their office and they were to call her to schedule. I have her Dr. Schneider's office number to call to inquire about that referral since they are the ones that placed it. She stated she would call. She also asked if you could send a \"maintenance steroid cream\" for her lupus/arthritis until she can get in with someone. Please advise.  "

## 2023-04-21 ENCOUNTER — TELEPHONE (OUTPATIENT)
Dept: GASTROENTEROLOGY | Facility: CLINIC | Age: 58
End: 2023-04-21
Payer: COMMERCIAL

## 2023-04-21 ENCOUNTER — TELEPHONE (OUTPATIENT)
Dept: FAMILY MEDICINE CLINIC | Facility: CLINIC | Age: 58
End: 2023-04-21
Payer: COMMERCIAL

## 2023-04-21 DIAGNOSIS — R19.7 DIARRHEA, UNSPECIFIED TYPE: Primary | ICD-10-CM

## 2023-04-21 RX ORDER — LOPERAMIDE HYDROCHLORIDE 2 MG/1
2 CAPSULE ORAL 4 TIMES DAILY PRN
Qty: 30 CAPSULE | Refills: 2 | Status: SHIPPED | OUTPATIENT
Start: 2023-04-21

## 2023-04-21 NOTE — TELEPHONE ENCOUNTER
Page with Home Health ([facility]) will be seeing patient 2 x's a week. Patient declined OT and PT due to pain in extremities. Wrapping legs with ace bandages. They are asking about the ointment that goes on legs. Patient is stating that she is allergic to sulpha drugs.

## 2023-04-21 NOTE — TELEPHONE ENCOUNTER
Spoke to Jeanine from Home Health and she stated that patient has 3 wounds on her legs. That they are using bacitracin on them and wanted to know if there is anything else that they are suppose to be using. Stated that patient stated Kiko Luevano took over diabetes management. Asking about Dilaudid as well. Wanted to know the dose. Stated Patient isn't using Lantus but is taking Humalog. Patient doesn't know the sliding scale for the Humalog. Stated that patient is feeling nauseous, upset stomach and has diarrhea. Asking If we can send imodium to pharmacy. Spoke with provider and he stated that He wants patient on unna boots if patient doesn't want to use unna boots then put bacitracin on it with Kerlix and Ace wrap. Stated that Dr. Dominguez prescribed the Humalog but that he is okay with patient taking both Humalog and Lantus. Humalog should be on a sliding scale and taken 3 times a day as needed with meals. Will call Jeanine and relay the message.

## 2023-04-21 NOTE — TELEPHONE ENCOUNTER
Relayed message to Jeanine and she stated that she understood.Jeanine wants to get her to diabetic education due to patient not having the lantus that she is suppose to be on.

## 2023-04-25 ENCOUNTER — LAB REQUISITION (OUTPATIENT)
Dept: LAB | Facility: HOSPITAL | Age: 58
End: 2023-04-25
Payer: COMMERCIAL

## 2023-04-25 ENCOUNTER — TELEPHONE (OUTPATIENT)
Dept: FAMILY MEDICINE CLINIC | Facility: CLINIC | Age: 58
End: 2023-04-25
Payer: COMMERCIAL

## 2023-04-25 DIAGNOSIS — N39.0 URINARY TRACT INFECTION WITHOUT HEMATURIA, SITE UNSPECIFIED: Primary | ICD-10-CM

## 2023-04-25 DIAGNOSIS — R19.7 DIARRHEA, UNSPECIFIED: ICD-10-CM

## 2023-04-25 PROCEDURE — 87505 NFCT AGENT DETECTION GI: CPT | Performed by: NURSE PRACTITIONER

## 2023-04-25 NOTE — TELEPHONE ENCOUNTER
Attempted to contact patient and phone was picked up but couldn't hear anything. Will attempt to call later.

## 2023-04-25 NOTE — TELEPHONE ENCOUNTER
Caller: MILAGROS HALE    Relationship: Home Health    Best call back number: [phone]    What orders are you requesting (i.e. lab or imaging): UACNS    In what timeframe would the patient need to come in: AS SOON AS POSSIBLE    Where will you receive your lab/imaging services: IN HOME    Additional notes: ASKING FOR ORDERS TO BE TO [phone]

## 2023-04-25 NOTE — TELEPHONE ENCOUNTER
"    Caller: MILAGROS HALE    Relationship: Home Health    Best call back number:[phone]    What medications are you currently taking:   Current Outpatient Medications on File Prior to Visit   Medication Sig Dispense Refill   • albuterol sulfate  (90 Base) MCG/ACT inhaler inhale TWO puffs BY MOUTH EVERY 4 HOURS AS NEEDED for wheezing 8.5 g 2   • acetaminophen (TYLENOL) 500 MG tablet Take 2-3 tablets by mouth Every 6 (Six) Hours As Needed for Mild Pain. 60 tablet 3   • B-D UF III MINI PEN NEEDLES 31G X 5 MM misc      • bacitracin 500 UNIT/GM ointment Apply 1 application topically to the appropriate area as directed 2 (Two) Times a Day. 28 g 5   • bacitracin 500 UNIT/GM ointment Apply 1 application topically to the appropriate area as directed 2 (Two) Times a Day. 28 g 1   • cetirizine (zyrTEC) 10 MG tablet Take 1 tablet by mouth Daily. 90 tablet 1   • clopidogrel (Plavix) 75 MG tablet Take 1 tablet by mouth Daily. 90 tablet 3   • Continuous Blood Gluc Sensor (Dexcom G6 Sensor) See Admin Instructions.     • Continuous Blood Gluc Transmit (Dexcom G6 Transmitter) misc See Admin Instructions.     • Continuous Glucose Monitor Sup kit 1 kit Continuous. One continuous glucose meter, and supplies needed. Give one month worth of supplies, with 3 refills. ICD-10: e11.9 1 kit 3   • Elastic Bandages & Supports (ACE Elastic Bandage 4\") misc 1 Device Daily. One on each leg, bilateral.   #24 = one month supply. 24 each 3   • Elastic Bandages & Supports (Medical Compression Socks) misc 1 Device Daily. bilateral compression socks 2 each 1   • EPINEPHrine (EPIPEN) 0.3 MG/0.3ML solution auto-injector injection TAKE AS DIRECTED INCASE OF ALLERGIC REACTION.     • FREESTYLE LITE test strip by Other route 4 (Four) Times a Day. Use to test blood sugar 4 times daily     • furosemide (LASIX) 40 MG tablet TAKE ONE TABLET BY MOUTH EVERY DAY 90 tablet 1   • gabapentin (NEURONTIN) 300 MG capsule TAKE ONE CAPSULE BY MOUTH THREE " TIMES DAILY (Patient taking differently: Take 1 capsule by mouth 4 (Four) Times a Day.) 270 capsule 1   • glucose blood test strip Use as instructed 100 each 12   • glucose monitor monitoring kit 1 each As Needed (Test blood sugar TID). 1 each 0   • HYDROmorphone (DILAUDID) 1 mg/mL solution Infuse  into a venous catheter Dose Adjusted By Provider As Needed. PER PAIN PUMP     • Insulin Lispro, 1 Unit Dial, (HUMALOG) 100 UNIT/ML solution pen-injector USE 2-12 UNITS THREE TIMES DAILY PER sliding scale     • ipratropium-albuterol (DUO-NEB) 0.5-2.5 mg/3 ml nebulizer Take 3 mL by nebulization 4 (Four) Times a Day. 360 mL 0   • Lancets (freestyle) lancets Use as directed. Check sugars tid E11.9 100 each 0   • Lancets misc 1 each 3 (Three) Times a Day. 100 each 12   • Lantus SoloStar 100 UNIT/ML injection pen Inject 10 Units under the skin into the appropriate area as directed Daily. Pt does not have this med 6 mL 2   • loperamide (IMODIUM) 2 MG capsule Take 1 capsule by mouth 4 (Four) Times a Day As Needed for Diarrhea. 30 capsule 2   • methylcellulose (Citrucel) oral powder Take 2 application by mouth Daily. 850 g 1   • methylPREDNISolone (MEDROL) 4 MG dose pack Take as directed on package instructions. 21 tablet 0   • ondansetron (ZOFRAN) 4 MG tablet TAKE ONE TABLET BY MOUTH EVERY 8 HOURS AS NEEDED FOR NAUSEA AND VOMITING 15 tablet 0   • sodium chloride (NS) 0.9 % irrigation Irrigate with 250 mL to the affected area as directed by provider Daily. Cleanse wound daily with sterile water.     • triamcinolone (KENALOG) 0.1 % ointment Apply  topically to the appropriate area as directed 2 (Two) Times a Day. 30 g 0     Current Facility-Administered Medications on File Prior to Visit   Medication Dose Route Frequency Provider Last Rate Last Admin   • heparin injection 500 Units  5 mL Intravenous PRN Vidal Luevano, APRN       • sodium chloride 0.9 % flush 10 mL  10 mL Intravenous Q12H Vidal Luevano, LUZ       • sodium  chloride 0.9 % flush 10 mL  10 mL Intravenous PRN Vidal Luevano APRN       • sodium chloride 0.9 % flush 20 mL  20 mL Intravenous PRN Vidal Luevano APRN       • sterile water irrigation solution 500 mL  500 mL Irrigation Once Damari Cornejo PA-C              When did you start taking these medications: NOT SURE    Which medication are you concerned about:    Lantus SoloStar 100 UNIT/ML injection pen         Who prescribed you this medication: LUZ LUEVANO    What are your concerns: CALLER STATES PATIENT REFUSES TO TAKE MEDICATION. STATES PATIENT DID NOT TAKE MEDICATION YESTERDAY 4.24.2023 AND STATES THAT PATIENTS SAYS THAT IT CAUSES LEG PAIN. CALLER DID ADVISE PATIENT THAT THE MEDICATION WOULD NOT CAUSE LEG PAIN PATIENT STILL INSISTED DOES.

## 2023-04-25 NOTE — TELEPHONE ENCOUNTER
Patient is refusing to take the Lantus this is what happened :   What are your concerns: CALLER STATES PATIENT REFUSES TO TAKE MEDICATION. STATES PATIENT DID NOT TAKE MEDICATION YESTERDAY 4.24.2023 AND STATES THAT PATIENTS SAYS THAT IT CAUSES LEG PAIN. CALLER DID ADVISE PATIENT THAT THE MEDICATION WOULD NOT CAUSE LEG PAIN PATIENT STILL INSISTED DOES.

## 2023-04-26 ENCOUNTER — TELEPHONE (OUTPATIENT)
Dept: FAMILY MEDICINE CLINIC | Facility: CLINIC | Age: 58
End: 2023-04-26
Payer: COMMERCIAL

## 2023-04-26 LAB
C COLI+JEJ+UPSA DNA STL QL NAA+NON-PROBE: NOT DETECTED
EC STX1+STX2 GENES STL QL NAA+NON-PROBE: NOT DETECTED
S ENT+BONG DNA STL QL NAA+NON-PROBE: NOT DETECTED
SHIGELLA SP+EIEC IPAH ST NAA+NON-PROBE: NOT DETECTED

## 2023-04-26 NOTE — TELEPHONE ENCOUNTER
Caller: Isha Llanes    Relationship: Self    Best call back number: [phone]    What medication are you requesting: STEROID    What are your current symptoms: LUPUS FLARE-UP    Have you had these symptoms before:    [x] Yes  [] No    Have you been treated for these symptoms before:   [x] Yes  [] No    If a prescription is needed, what is your preferred pharmacy and phone number: [facility] - [place], [place] - [address] [facility] [phone] [facility] [phone] FX     Additional notes:  THE PATIENT STATED SHE IS HAVING A LUPUS FLARE-UP AND WOULD LIKE A STEROID CALLED IN TO THE PHARMACY [facility].

## 2023-04-27 ENCOUNTER — TELEPHONE (OUTPATIENT)
Dept: FAMILY MEDICINE CLINIC | Facility: CLINIC | Age: 58
End: 2023-04-27
Payer: COMMERCIAL

## 2023-04-27 NOTE — TELEPHONE ENCOUNTER
Luisa WILSON stated that [facility] called about patient's lantus. While on the phone, [facility] said she collected the stool sample and took it to the hospital. Luisa WILSON just now called Ave back and she stated they dropped off the stool sample but the hospital must not have clicked on all the orders.  Luisa WILSON called the lab to inquire about why only one of the tests was performed. The person on the phone stated she sees all the orders in the computer but does not know why they were not all done. [facility] will see the patient on 04/28/2023 and will attempt to recollect specimens (stool and urine).

## 2023-04-27 NOTE — TELEPHONE ENCOUNTER
Let Yoselin from [employer] know that we did receive the paperwork. That provider is out on business trip and will return on Tuesday. Let her know the paperwork will be signed and faxed on Tuesday. She stated that she understood.

## 2023-04-27 NOTE — TELEPHONE ENCOUNTER
Let patient know that provider was out of the office and that recommendation for a lupus flare up would be to go to urgent care. Patient stated this is ridiculous. Apologized to patient and let her know there was nothing else we could do.

## 2023-04-27 NOTE — TELEPHONE ENCOUNTER
Patient called back and stated that she is taking the Lantus but doesn't want to no longer take it because its causing burning and sharp shooting pains under her feet. Patient was told that she should be on the Humalog as well with a sliding scale. She stated that she doesn't have that medication. Patient stated that she is having hip pain and is unable to more around and get things done. Wanted to know if provider would send in a Medrol Dose Pack. And then requested the Humalog to be called in. Please advise since never prescribed by you.

## 2023-04-27 NOTE — TELEPHONE ENCOUNTER
Caller: KAMERON    Relationship: [employer]    Best call back number:[phone]     What is the best time to reach you: ANYTIME BETWEEN 8-3.    Who are you requesting to speak with (clinical staff, provider,  specific staff member): CLINICAL    Do you know the name of the person who called: KAMERON    What was the call regarding: KAMERON WANTED TO KNOW IF YOU HAD RECEIVED FAXES FROM THEIR COMPANY ABOUT  A LYMPHEDEMA PUMP.PLEASE CALL HER BACK AND ADVISE.    SHE WAS ABLE TO VERIFY 2 OF 3 IDENTIFIERS. I WAS UNABLE TO WARM TRANSFER THIS CALL.    Do you require a callback: YES

## 2023-04-27 NOTE — TELEPHONE ENCOUNTER
Patient has albuterol for her breathing treatments but cannot find her machine.    She was wondering if she could get a new machine called into her [facility] pharmacy    Call back # [phone]

## 2023-04-27 NOTE — TELEPHONE ENCOUNTER
Called patient and left a message on voicemail wanting to know why patient is refusing to take lantus and that she would need to take the lantus to help get her blood sugars down. To call us back and let us know what was going on.

## 2023-04-27 NOTE — TELEPHONE ENCOUNTER
----- Message from LUZ Zamora sent at 4/27/2023  8:33 AM EDT -----  I had put in several orders for stool cultures on 4/21. On 4/25 someone put in a duplicate of 1 of 4 orders was placed and did not collect on the other 3. There are several orders that was not collected on the stool sample that was provided. We will need to have another stool sample and collect on the rest of the orders.

## 2023-04-28 ENCOUNTER — TELEPHONE (OUTPATIENT)
Dept: FAMILY MEDICINE CLINIC | Facility: CLINIC | Age: 58
End: 2023-04-28

## 2023-04-28 ENCOUNTER — TELEPHONE (OUTPATIENT)
Dept: FAMILY MEDICINE CLINIC | Facility: CLINIC | Age: 58
End: 2023-04-28
Payer: COMMERCIAL

## 2023-04-28 ENCOUNTER — LAB REQUISITION (OUTPATIENT)
Dept: LAB | Facility: HOSPITAL | Age: 58
End: 2023-04-28
Payer: COMMERCIAL

## 2023-04-28 DIAGNOSIS — Z79.4 TYPE 2 DIABETES MELLITUS WITH HYPERGLYCEMIA, WITH LONG-TERM CURRENT USE OF INSULIN: ICD-10-CM

## 2023-04-28 DIAGNOSIS — R39.11 HESITANCY OF MICTURITION: ICD-10-CM

## 2023-04-28 DIAGNOSIS — E11.65 TYPE 2 DIABETES MELLITUS WITH HYPERGLYCEMIA, WITH LONG-TERM CURRENT USE OF INSULIN: ICD-10-CM

## 2023-04-28 DIAGNOSIS — N39.0 URINARY TRACT INFECTION, SITE NOT SPECIFIED: ICD-10-CM

## 2023-04-28 LAB
BACTERIA UR QL AUTO: ABNORMAL /HPF
BILIRUB UR QL STRIP: NEGATIVE
CLARITY UR: CLEAR
COLOR UR: YELLOW
GLUCOSE UR STRIP-MCNC: NEGATIVE MG/DL
HGB UR QL STRIP.AUTO: ABNORMAL
HYALINE CASTS UR QL AUTO: ABNORMAL /LPF
KETONES UR QL STRIP: NEGATIVE
LEUKOCYTE ESTERASE UR QL STRIP.AUTO: ABNORMAL
NITRITE UR QL STRIP: NEGATIVE
PH UR STRIP.AUTO: 6 [PH] (ref 5–8)
PROT UR QL STRIP: NEGATIVE
RBC # UR STRIP: ABNORMAL /HPF
REF LAB TEST METHOD: ABNORMAL
SP GR UR STRIP: 1.02 (ref 1–1.03)
SQUAMOUS #/AREA URNS HPF: ABNORMAL /HPF
UROBILINOGEN UR QL STRIP: ABNORMAL
WBC # UR STRIP: ABNORMAL /HPF

## 2023-04-28 PROCEDURE — 81001 URINALYSIS AUTO W/SCOPE: CPT | Performed by: NURSE PRACTITIONER

## 2023-04-28 PROCEDURE — 87086 URINE CULTURE/COLONY COUNT: CPT | Performed by: NURSE PRACTITIONER

## 2023-04-28 RX ORDER — INSULIN GLARGINE 100 [IU]/ML
10 INJECTION, SOLUTION SUBCUTANEOUS DAILY
Qty: 6 ML | Refills: 2 | Status: SHIPPED | OUTPATIENT
Start: 2023-04-28

## 2023-04-28 RX ORDER — LANCETS 30 GAUGE
1 EACH MISCELLANEOUS 3 TIMES DAILY
Qty: 100 EACH | Refills: 12 | Status: SHIPPED | OUTPATIENT
Start: 2023-04-28

## 2023-04-28 RX ORDER — FLURBIPROFEN SODIUM 0.3 MG/ML
100 SOLUTION/ DROPS OPHTHALMIC 4 TIMES DAILY
Qty: 100 EACH | Refills: 12 | Status: SHIPPED | OUTPATIENT
Start: 2023-04-28

## 2023-04-28 NOTE — TELEPHONE ENCOUNTER
Lizette with [facility], called to state that patient does not have Insulin, Needles or Syringes. Please call Lizette @ [phone]

## 2023-04-28 NOTE — TELEPHONE ENCOUNTER
Sent in WintegratIQuum , BD needles. Unsure of what syringes patient would need and the dose for Humalog. Please advise.

## 2023-04-28 NOTE — TELEPHONE ENCOUNTER
Caller: Isha Llanes    Relationship: Self    Best call back number: [phone]    What medication are you requesting: STEROID PACKET UNSURE OF NAME HAS BEEN PRESCRIBED BEFORE      Have you had these symptoms before:    [] Yes  [x] No    Have you been treated for these symptoms before:   [] Yes  [x] No    If a prescription is needed, what is your preferred pharmacy and phone number: [facility] - [place], [place] - [address] [facility] [phone] [facility] [phone] FX     Additional notes:  PLEASE NOTIFY WHEN SENT

## 2023-04-29 LAB — BACTERIA SPEC AEROBE CULT: NORMAL

## 2023-05-01 RX ORDER — INSULIN LISPRO 100 [IU]/ML
INJECTION, SOLUTION INTRAVENOUS; SUBCUTANEOUS
Qty: 6 ML | Refills: 2 | Status: SHIPPED | OUTPATIENT
Start: 2023-05-01

## 2023-05-02 ENCOUNTER — TELEPHONE (OUTPATIENT)
Dept: FAMILY MEDICINE CLINIC | Facility: CLINIC | Age: 58
End: 2023-05-02
Payer: COMMERCIAL

## 2023-05-02 ENCOUNTER — LAB REQUISITION (OUTPATIENT)
Dept: LAB | Facility: HOSPITAL | Age: 58
End: 2023-05-02
Payer: COMMERCIAL

## 2023-05-02 DIAGNOSIS — R19.7 DIARRHEA, UNSPECIFIED: ICD-10-CM

## 2023-05-02 DIAGNOSIS — R42 DIZZINESS AND GIDDINESS: ICD-10-CM

## 2023-05-02 LAB
027 TOXIN: NORMAL
C DIFF TOX GENS STL QL NAA+PROBE: NEGATIVE
LACTOFERRIN STL QL LA: NEGATIVE

## 2023-05-02 PROCEDURE — 87493 C DIFF AMPLIFIED PROBE: CPT | Performed by: NURSE PRACTITIONER

## 2023-05-02 PROCEDURE — 83630 LACTOFERRIN FECAL (QUAL): CPT | Performed by: NURSE PRACTITIONER

## 2023-05-02 PROCEDURE — 87506 IADNA-DNA/RNA PROBE TQ 6-11: CPT | Performed by: NURSE PRACTITIONER

## 2023-05-02 NOTE — TELEPHONE ENCOUNTER
THANIA WITH [facility] CALLED IN ABOUT PATIENT WITH QUESTIONS     ABOUT HER INSULIN. SHE WAS UNDER THE IMPRESSION THAT PATIENT WAS ON LANTIS BUT THERE IS NO REFILL OF LANTIS PUT IN FOR PATIENT AND THERE IS NONE IN THE HOUSE. PATIENT DOES HAVE LISPRO IN THE HOUSE BUT WAS TOLD BY A PREVIOUS NURSE TO NOT TAKE IT, BUT THAT IS WHAT WAS SENT IN.  SHE WANT TO MAKE SURE TO ADVISE PATIENT IN THE RIGHT WAY ON WHAT TO TAKE AND HOW    PLEASE CALL AND ADVISE [phone]

## 2023-05-03 LAB
C COLI+JEJ+UPSA DNA STL QL NAA+NON-PROBE: NOT DETECTED
CRYPTOSP DNA STL QL NAA+NON-PROBE: NOT DETECTED
E HISTOLYT DNA STL QL NAA+NON-PROBE: NOT DETECTED
EC STX1+STX2 GENES STL QL NAA+NON-PROBE: NOT DETECTED
G LAMBLIA DNA STL QL NAA+NON-PROBE: NOT DETECTED
S ENT+BONG DNA STL QL NAA+NON-PROBE: NOT DETECTED
SHIGELLA SP+EIEC IPAH ST NAA+NON-PROBE: NOT DETECTED

## 2023-05-03 NOTE — TELEPHONE ENCOUNTER
I called the number listed for Carrie and it is not in service. I then called [facility] and they stated they don't have a Carrie there but would contact one of the 2 nurses listed for patient and have them call me back to discuss.

## 2023-05-04 ENCOUNTER — PRIOR AUTHORIZATION (OUTPATIENT)
Dept: FAMILY MEDICINE CLINIC | Facility: CLINIC | Age: 58
End: 2023-05-04
Payer: COMMERCIAL

## 2023-05-04 ENCOUNTER — HOSPITAL ENCOUNTER (OUTPATIENT)
Dept: PET IMAGING | Facility: HOSPITAL | Age: 58
Discharge: HOME | End: 2023-05-04
Payer: COMMERCIAL

## 2023-05-04 ENCOUNTER — TELEPHONE (OUTPATIENT)
Dept: FAMILY MEDICINE CLINIC | Facility: CLINIC | Age: 58
End: 2023-05-04
Payer: COMMERCIAL

## 2023-05-04 DIAGNOSIS — Z85.71 HISTORY OF HODGKIN'S LYMPHOMA: ICD-10-CM

## 2023-05-04 PROCEDURE — A9552 F18 FDG: HCPCS | Performed by: INTERNAL MEDICINE

## 2023-05-04 PROCEDURE — 0 FLUDEOXYGLUCOSE F18 SOLUTION: Performed by: INTERNAL MEDICINE

## 2023-05-04 PROCEDURE — 78815 PET IMAGE W/CT SKULL-THIGH: CPT

## 2023-05-04 RX ORDER — INSULIN DETEMIR 100 [IU]/ML
10 INJECTION, SOLUTION SUBCUTANEOUS DAILY
Qty: 6 ML | Refills: 3 | Status: SHIPPED | OUTPATIENT
Start: 2023-05-04

## 2023-05-04 RX ADMIN — FLUDEOXYGLUCOSE F18 1 DOSE: 300 INJECTION INTRAVENOUS at 09:06

## 2023-05-04 NOTE — PROGRESS NOTES
Insurance will not pay for Lantus. Kiko Luevano stated to change medication to Levemir as this is a preferred medication for her insurance.

## 2023-05-04 NOTE — PROGRESS NOTES
"Called Isha Llanes at [phone] to relay results, however was unable to reach patient. Unable to LVM asking to please return call, patient's number on file is \"unable to receive calls at this time\". "

## 2023-05-04 NOTE — TELEPHONE ENCOUNTER
RENÉE ABARCA Key: [id number] - PA Case ID: [id number] - Rx #: [id number] help? Call us at [phone]  Outcome  Deniedon May 3  This request has not been approved. Based on the information submitted for review, you did not meet our guideline rules for the requested drug. In order for your request to be approved, your provider would need to show that you have met the guideline rules below. The details below are written in medical language. If you have questions, please contact your provider. In some cases, the requested medication or alternatives offered may have additional approval requirements. Our guideline named INSULIN AND RELATED AGENTS requires the following rule(s) be met for approval: A. There is clinical rationale (such as intolerance [side effect] to an inactive ingredient [ingredient in drug not used to treat the condition]) that a preferred product cannot be used. Your doctor told us that you have a diagnosis of type 2 diabetes mellitus (a disorder with high blood sugar). We do not have information showing that a preferred product cannot be used or that you meet the other criteria listed above. This is why your request is denied. Please work with your doctor to use a different medication or get us more information if it will allow us to approve this request. A written notification letter will follow with additional details.  Drug  Insulin Glargine-yfgn 100UNIT/ML pen-injectors  Form  [employer] [place] Medicaid ePA Form 2017 NCPDP  Original Claim Info  75 TRANS FEE = 0.00PA REQUIRED: CALL [phone] FOR ASSISTANCEINSULIN GLARGINE OR LEVEMIR PREFERRED For [employer] Coupon Price of: $112.36 submit to BIN: [id number] PCN: MARTHA Group: COUPON --Service provided at no cost and no switch fee to the pharmacy--

## 2023-05-04 NOTE — TELEPHONE ENCOUNTER
I spoke with Lizette from [facility]. Patient's Lantus pa was denied. Their preferred formulary medication is Levemir and Kiko Luevano sent that medication to her pharmacy. Lizette stated she would relay the message.

## 2023-05-04 NOTE — PROGRESS NOTES
"Called Isha Llanes at [phone] to relay results, however was unable to reach patient. Unable to LVM asking to please return call, patient's number on file is \"unable to receive calls at this time\". "

## 2023-05-04 NOTE — TELEPHONE ENCOUNTER
Spoke with Lizette from [facility] [phone] and let her know the PA for Lantus was denied. Their preferred medication is Levemir which is the same medication as Lantus. She verbalized understanding and stated she would let the pt know.

## 2023-05-05 ENCOUNTER — TELEPHONE (OUTPATIENT)
Dept: ONCOLOGY | Facility: HOSPITAL | Age: 58
End: 2023-05-05
Payer: COMMERCIAL

## 2023-05-05 ENCOUNTER — TELEPHONE (OUTPATIENT)
Dept: FAMILY MEDICINE CLINIC | Facility: CLINIC | Age: 58
End: 2023-05-05
Payer: COMMERCIAL

## 2023-05-05 ENCOUNTER — TELEPHONE (OUTPATIENT)
Dept: FAMILY MEDICINE CLINIC | Facility: CLINIC | Age: 58
End: 2023-05-05

## 2023-05-05 DIAGNOSIS — R94.8 ABNORMAL POSITRON EMISSION TOMOGRAPHY (PET) SCAN: Primary | ICD-10-CM

## 2023-05-05 DIAGNOSIS — Z85.71 HISTORY OF HODGKIN'S LYMPHOMA: ICD-10-CM

## 2023-05-05 NOTE — TELEPHONE ENCOUNTER
Attempted to call patient today to relay results of PET/CT scan which revealed increased hypermetabolic activity within the right aspect of the vallecula at the deep right base of tongue and my recommendation for ENT consultation for direct visualization.  Patient's phone message was that she was accepting calls at this time and there is no option to leave voicemail.  I have requested my clinic nurse to call patient again today and we will mail patient letter if were not able to reach her via phone.  We will also remind patient of her upcoming clinic appointment with me via letter or phone call.  Also, requested my clinic nurse place referral to ENT today.      Electronically signed by Jeaneth Haywood MD, 05/05/23, 8:27 AM EDT.

## 2023-05-05 NOTE — TELEPHONE ENCOUNTER
Caller: CastorIsha    Relationship: Self    Best call back number:[phone]    What is the best time to reach you: ANYTIME.   Who are you requesting to speak with (clinical staff, provider,  specific staff member):CARMEN      What was the call regarding: PATIENT ISHA CALLED TO SPEAK TO SOMEONE ABOUT HER PET SCAN RESULTS THAT SHE HAD DONE YESTERDAY ON 5/4/23.    Do you require a callback:YES

## 2023-05-05 NOTE — TELEPHONE ENCOUNTER
Caller: Isha Llanes    Relationship: Self    Best call back number: [phone]    What medication are you requesting: STEROID PACK    What are your current symptoms: LUPUS, JOINT PAIN AND ARTHRITIS    How long have you been experiencing symptoms: PATIENT STATED THAT SHE TRIED TO CALL THIS IN LAST WEEK, BUT NEVER HEARD BACK.    Have you had these symptoms before:    [] Yes  [x] No    Have you been treated for these symptoms before:   [] Yes  [x] No    If a prescription is needed, what is your preferred pharmacy and phone number: [facility] - [place], [place] - [address] [facility] - [phone]  - [phone] FX     Additional notes: PATIENT STATED THAT SHE HAS BEEN TRYING TO GET A STEROID PACK FOR A WEEK NOW. PLEASE CALL THE PATIENT WHEN THIS HAS BEEN SENT TO THE PHARMACY.

## 2023-05-05 NOTE — TELEPHONE ENCOUNTER
Returned call to patient, she had questions and was transferred to Dr. Haywood for further discussion.

## 2023-05-05 NOTE — TELEPHONE ENCOUNTER
A user error has taken place: encounter opened in error, closed for administrative reasons.     Statement Selected

## 2023-05-05 NOTE — TELEPHONE ENCOUNTER
Attempted to reach patient to let her know about the Humalog and it stated this person is not receiving calls at this time. Will attempt to call later. If not reachable I will send a letter.

## 2023-05-05 NOTE — TELEPHONE ENCOUNTER
"I spoke with patient and let her know Kiko Luevano stated he can't give her steroids at this time. Patient stated she was told years ago that she had lupus but saw an \"arthritis doctor\" and they told her she doesn't have it. She stated she doesn't want to go back to him. I asked her if she would like to see another doctor for another opinion who may be able to manage her symptoms and she stated she would like that. I also told her we have been trying to get in touch with her about her stool and urine results but have been unsuccessful and sent them in the mail. I went over them with her on the phone. Kiko Luevano has asked in the urinalysis result note if patient is having urinary issues. She said she had a sling procedure 10 years ago and thinks she might be having problems urinating because of that. Please advise.  "

## 2023-05-05 NOTE — TELEPHONE ENCOUNTER
"Attempted to call patient and her phone keeps ringing and ringing. We have made multiple attempts for the past week to contact patient. The phone either says \"phone unable to accept calls at this time\" or it keeps ringing. Kiko Chloé has stated he would not send this medication in for her as she had a steroid pack recently.  "

## 2023-05-05 NOTE — TELEPHONE ENCOUNTER
Attempted to call the patient to remind her of the appointment on 5/10 and to let her know to expect a call from ENT to schedule appointment for evaluation. Unable to get an answer and unable to leave a message.      Vaccine status unknown

## 2023-05-08 ENCOUNTER — TELEPHONE (OUTPATIENT)
Dept: FAMILY MEDICINE CLINIC | Facility: CLINIC | Age: 58
End: 2023-05-08
Payer: COMMERCIAL

## 2023-05-08 NOTE — TELEPHONE ENCOUNTER
Akil with [facility] is requesting a return call. Patient picked up prescription Levemir. Called pharmacist and stated she had never had that before and was unsure if it was correct. Please call pharmacy at [phone]

## 2023-05-09 ENCOUNTER — TELEPHONE (OUTPATIENT)
Dept: FAMILY MEDICINE CLINIC | Facility: CLINIC | Age: 58
End: 2023-05-09

## 2023-05-09 RX ORDER — CALCIUM CARB/VITAMIN D3/VIT K1 500-100-40
1 TABLET,CHEWABLE ORAL DAILY
Qty: 100 EACH | Refills: 2 | Status: SHIPPED | OUTPATIENT
Start: 2023-05-09

## 2023-05-09 NOTE — TELEPHONE ENCOUNTER
Caller: Isha Llanes    Relationship: Self    Best call back number: [phone]    What medication are you requesting: SOMETHING FOR LISTED SYMPTOMS    What are your current symptoms: RED EYES, GOOPY EYES    How long have you been experiencing symptoms: 2 DAYS    Have you had these symptoms before:    [x] Yes  [] No    Have you been treated for these symptoms before:   [x] Yes  [] No    If a prescription is needed, what is your preferred pharmacy and phone number:  [facility] - [place], [place] - [address] [facility] [place] - [phone] [facility] [phone]   [phone]    Additional notes:

## 2023-05-09 NOTE — TELEPHONE ENCOUNTER
Left voicemail with Lizette from [facility] that we sent in the vial instead of the pens of Levemir. I stated patient needs syringes and we have them here to give her but pt has no transportation to get them. I asked if they could have someone from home health pick them up.

## 2023-05-09 NOTE — TELEPHONE ENCOUNTER
I spoke with patient and pharmacy. The lantus she was on was denied by insurance. We sent over Levemir but sent the wrong prescription. It was supposed to be for the pens but the vial was sent to pharmacy. Patient already picked up the vial and has no syringes. I stated we have some that she can have but she has no way to get here. I stated I would call [facility] and see if this is something they could  for her. Patient verbalized understanding.

## 2023-05-10 ENCOUNTER — TELEPHONE (OUTPATIENT)
Dept: FAMILY MEDICINE CLINIC | Facility: CLINIC | Age: 58
End: 2023-05-10
Payer: COMMERCIAL

## 2023-05-10 RX ORDER — ERYTHROMYCIN 5 MG/G
OINTMENT OPHTHALMIC 2 TIMES DAILY
Qty: 1 G | Refills: 0 | Status: SHIPPED | OUTPATIENT
Start: 2023-05-10 | End: 2023-05-17

## 2023-05-10 NOTE — TELEPHONE ENCOUNTER
APRYL SANCHEZ/ [facility] STATES THEY WERE INITIALLY GOING TO DISCHARGE PATIENT TODAY HOWEVER THEY NEED TO DO INSULIN TRAINING WITH PATIENT, IS REQUESTING A CONTINUATION ORDER 1 VISIT FOR 4 WEEKS.    CONTACT INFO -[phone]

## 2023-05-10 NOTE — TELEPHONE ENCOUNTER
Spoke to [facility] and gave the okay for continuation of therapy due to insulin . She stated that she understood.

## 2023-05-12 ENCOUNTER — OFFICE VISIT (OUTPATIENT)
Dept: OTOLARYNGOLOGY | Facility: CLINIC | Age: 58
End: 2023-05-12
Payer: COMMERCIAL

## 2023-05-12 VITALS — BODY MASS INDEX: 26.31 KG/M2 | HEIGHT: 66 IN | HEART RATE: 83 BPM | RESPIRATION RATE: 22 BRPM | TEMPERATURE: 97.5 F

## 2023-05-12 DIAGNOSIS — C32.9 LARYNGEAL CANCER: ICD-10-CM

## 2023-05-12 DIAGNOSIS — R07.0 THROAT PAIN: ICD-10-CM

## 2023-05-12 DIAGNOSIS — R49.0 VOICE HOARSENESS: ICD-10-CM

## 2023-05-12 DIAGNOSIS — Z72.0 TOBACCO ABUSE: Primary | ICD-10-CM

## 2023-05-12 PROCEDURE — 1159F MED LIST DOCD IN RCRD: CPT | Performed by: OTOLARYNGOLOGY

## 2023-05-12 PROCEDURE — 99204 OFFICE O/P NEW MOD 45 MIN: CPT | Performed by: OTOLARYNGOLOGY

## 2023-05-12 PROCEDURE — 1160F RVW MEDS BY RX/DR IN RCRD: CPT | Performed by: OTOLARYNGOLOGY

## 2023-05-12 RX ORDER — BLOOD-GLUCOSE METER
EACH MISCELLANEOUS 3 TIMES DAILY
COMMUNITY
Start: 2023-04-12 | End: 2023-05-19

## 2023-05-12 RX ORDER — BROMPHENIRAMINE MALEATE, PSEUDOEPHEDRINE HYDROCHLORIDE, AND DEXTROMETHORPHAN HYDROBROMIDE 2; 30; 10 MG/5ML; MG/5ML; MG/5ML
SYRUP ORAL
COMMUNITY
Start: 2023-04-22 | End: 2023-05-19

## 2023-05-15 DIAGNOSIS — J44.9 CHRONIC OBSTRUCTIVE PULMONARY DISEASE, UNSPECIFIED COPD TYPE: ICD-10-CM

## 2023-05-15 PROBLEM — Z72.0 TOBACCO ABUSE: Status: ACTIVE | Noted: 2023-05-15

## 2023-05-15 PROBLEM — R07.0 THROAT PAIN: Status: ACTIVE | Noted: 2023-05-15

## 2023-05-15 PROBLEM — C32.9 LARYNGEAL CANCER: Status: ACTIVE | Noted: 2023-05-15

## 2023-05-15 PROBLEM — R49.0 VOICE HOARSENESS: Status: ACTIVE | Noted: 2023-05-15

## 2023-05-15 PROCEDURE — 31575 DIAGNOSTIC LARYNGOSCOPY: CPT | Performed by: OTOLARYNGOLOGY

## 2023-05-15 RX ORDER — ALBUTEROL SULFATE 90 UG/1
AEROSOL, METERED RESPIRATORY (INHALATION)
Qty: 18 G | Refills: 2 | Status: SHIPPED | OUTPATIENT
Start: 2023-05-15

## 2023-05-15 NOTE — PROGRESS NOTES
Patient Name: Isha Llanes   Visit Date: 05/12/2023   Patient ID: [id number]  Provider: Ronnie Mcgarry MD    Sex: female  [facility] [facility]   YOB: 1965  Location Address: [address] [place] [address], [address],?[place]?[id number]    Primary Care Provider Vidal Luevano APRN  Location Phone: [phone]    Referring Provider: Jeaneth Haywood MD        Chief Complaint  abnormal PET scan  (New patient )    Subjective    History of Present Illness  Isha Llanes is a 57 y.o. female who presents to [facility] today as a consult from Jeaneth Haywood MD.    She presents to the clinic today for evaluation of over 6-month history of voice hoarseness with progressive worsening, throat pain, pain with swallowing, and recent PET scan consistent with a hypermetabolic lesion along the vallecula and the base of tongue with an SUV of 5.1 along the right.  I personally reviewed the imaging with her and discussed the findings and results.  She is a lifelong smoker and continues to smoke over 1 pack/day.  She does have a complicated medical history and has had lymphoma with recurrence in 2008 and 2012.  She is supposedly disease-free at this point.  She has had chemoradiation therapy and is established with hematology oncology and radiation oncology who continue to follow her.  In reviewing her medical history she has significant other medical problems, and is currently wheelchair-bound.  She informs me that voice hoarseness and throat pain have been there for quite some time and do seem to be worsening and bothering her more.  She denies any major weight loss.  Unfortunately she has continued to smoke despite these issues.    Past Medical History:   Diagnosis Date   • Anxiety     NO CURRENT MEDICATION   • Aortic stenosis, severe    • Arthritis    • Asthma    • Back pain    • Blood clotting disorder    • Cancer associated pain    • Chronic low back pain  with sciatica    • Chronic pain disorder    • Diabetes mellitus     TYPE 2   • Dyspnea on effort    • GERD (gastroesophageal reflux disease)    • H/O lumpectomy    • H/O: hysterectomy    • Hiatal hernia    • History of degenerative disc disease    • History of kidney stones    • History of pulmonary embolus (PE)    • History of transfusion    • Hodgkin's lymphoma    • Immobility    • Liver disease    • Lupus    • Mitral valve prolapse    • Panic disorder    • Pelvic pain    • Peripheral neuropathy    • Personal history of DVT (deep vein thrombosis)    • Presence of intrathecal pump    • PTSD (post-traumatic stress disorder)    • Sacroiliac joint disease    • SI (sacroiliac) joint inflammation    • Venous insufficiency        Past Surgical History:   Procedure Laterality Date   • BACK SURGERY      SPINAL FUSION    • BLADDER REPAIR     • CARDIAC CATHETERIZATION N/A 3/6/2019    Procedure: Valvuloplasty;  Surgeon: Wayne Johnson MD;  Location: [facility] INVASIVE LOCATION;  Service: Cardiology   • CARDIAC VALVE REPLACEMENT  2021   • CHOLECYSTECTOMY     • COLONOSCOPY N/A 12/29/2021    Procedure: COLONOSCOPY;  Surgeon: Karine Orlando MD;  Location: [facility] ENDOSCOPY;  Service: Gastroenterology;  Laterality: N/A;  POOR PREP   • COLONOSCOPY N/A 4/13/2022    Procedure: COLONOSCOPY WITH POLYPECTOMY;  Surgeon: Karine Orlando MD;  Location: [facility] ENDOSCOPY;  Service: Gastroenterology;  Laterality: N/A;  COLON POLYP, HEMORRHOIDS, POOR PREP   • ENDOSCOPY N/A 12/29/2021    Procedure: ESOPHAGOGASTRODUODENOSCOPY;  Surgeon: Karine Orlando MD;  Location: [facility] ENDOSCOPY;  Service: Gastroenterology;  Laterality: N/A;  ESOPHAGITIS, GASTRITIS, HIATAL HERNIA   • HYSTERECTOMY     • LYMPHADENECTOMY     • PACEMAKER IMPLANTATION     • PAIN PUMP INSERTION/REVISION N/A 10/18/2019    Procedure: PAIN PUMP INSERTION [facility] 10-18-19 [place];  Surgeon: Horace Rios MD;  Location: [facility] OR;  Service:  "Pain Management   • PAIN PUMP INSERTION/REVISION N/A 11/23/2020    Procedure: pain pump removal;  Surgeon: Horace Rios MD;  Location: [facility] OR;  Service: Pain Management;  Laterality: N/A;   • TONSILLECTOMY     • TUBAL ABDOMINAL LIGATION     • TUMOR REMOVAL           Current Outpatient Medications:   •  acetaminophen (TYLENOL) 500 MG tablet, Take 2-3 tablets by mouth Every 6 (Six) Hours As Needed for Mild Pain., Disp: 60 tablet, Rfl: 3  •  albuterol sulfate  (90 Base) MCG/ACT inhaler, inhale TWO puffs BY MOUTH EVERY 4 HOURS AS NEEDED for wheezing, Disp: 8.5 g, Rfl: 2  •  B-D UF III MINI PEN NEEDLES 31G X 5 MM misc, Inject 100 each under the skin into the appropriate area as directed 4 (Four) Times a Day., Disp: 100 each, Rfl: 12  •  bacitracin 500 UNIT/GM ointment, Apply 1 application topically to the appropriate area as directed 2 (Two) Times a Day., Disp: 28 g, Rfl: 5  •  Blood Glucose Monitoring Suppl (ONE TOUCH ULTRA 2) w/Device kit, 3 (Three) Times a Day. as directed, Disp: , Rfl:   •  clopidogrel (Plavix) 75 MG tablet, Take 1 tablet by mouth Daily., Disp: 90 tablet, Rfl: 3  •  Continuous Glucose Monitor Sup kit, 1 kit Continuous. One continuous glucose meter, and supplies needed. Give one month worth of supplies, with 3 refills. ICD-10: e11.9, Disp: 1 kit, Rfl: 3  •  Elastic Bandages & Supports (ACE Elastic Bandage 4\") misc, 1 Device Daily. One on each leg, bilateral.   #24 = one month supply., Disp: 24 each, Rfl: 3  •  erythromycin (ROMYCIN) 5 MG/GM ophthalmic ointment, Administer  to both eyes 2 (Two) Times a Day for 7 days., Disp: 1 g, Rfl: 0  •  FREESTYLE LITE test strip, by Other route 4 (Four) Times a Day. Use to test blood sugar 4 times daily, Disp: , Rfl:   •  furosemide (LASIX) 40 MG tablet, TAKE ONE TABLET BY MOUTH EVERY DAY, Disp: 90 tablet, Rfl: 1  •  gabapentin (NEURONTIN) 300 MG capsule, TAKE ONE CAPSULE BY MOUTH THREE TIMES DAILY (Patient taking differently: Take 1 capsule by " "mouth 4 (Four) Times a Day.), Disp: 270 capsule, Rfl: 1  •  glucose blood test strip, Use as instructed, Disp: 100 each, Rfl: 12  •  glucose monitor monitoring kit, 1 each As Needed (Test blood sugar TID)., Disp: 1 each, Rfl: 0  •  HYDROmorphone (DILAUDID) 1 mg/mL solution, Infuse  into a venous catheter Dose Adjusted By Provider As Needed. PER PAIN PUMP, Disp: , Rfl:   •  Insulin Syringe 31G X 5/16\" 1 ML misc, 1 each Daily., Disp: 100 each, Rfl: 2  •  Lancets misc, 1 each 3 (Three) Times a Day., Disp: 100 each, Rfl: 12  •  loperamide (IMODIUM) 2 MG capsule, Take 1 capsule by mouth 4 (Four) Times a Day As Needed for Diarrhea., Disp: 30 capsule, Rfl: 2  •  bacitracin 500 UNIT/GM ointment, Apply 1 application topically to the appropriate area as directed 2 (Two) Times a Day. (Patient not taking: Reported on 5/12/2023), Disp: 28 g, Rfl: 1  •  brompheniramine-pseudoephedrine-DM 30-2-10 MG/5ML syrup, Take 10 mL by mouth 4 Times a Day As Needed for Cough or Allergies. (Patient not taking: Reported on 5/12/2023), Disp: , Rfl:   •  cetirizine (zyrTEC) 10 MG tablet, Take 1 tablet by mouth Daily. (Patient not taking: Reported on 5/12/2023), Disp: 90 tablet, Rfl: 1  •  Continuous Blood Gluc Sensor (Dexcom G6 Sensor), See Admin Instructions. (Patient not taking: Reported on 5/12/2023), Disp: , Rfl:   •  Continuous Blood Gluc Transmit (Dexcom G6 Transmitter) misc, See Admin Instructions. (Patient not taking: Reported on 5/12/2023), Disp: , Rfl:   •  Elastic Bandages & Supports (Medical Compression Socks) misc, 1 Device Daily. bilateral compression socks (Patient not taking: Reported on 5/12/2023), Disp: 2 each, Rfl: 1  •  EPINEPHrine (EPIPEN) 0.3 MG/0.3ML solution auto-injector injection, TAKE AS DIRECTED INCASE OF ALLERGIC REACTION. (Patient not taking: Reported on 5/12/2023), Disp: , Rfl:   •  insulin detemir (LEVEMIR) 100 UNIT/ML injection, Inject 10 Units under the skin into the appropriate area as directed Daily for 90 " days. (Patient not taking: Reported on 5/12/2023), Disp: 9 mL, Rfl: 3  •  Insulin Lispro, 1 Unit Dial, (HUMALOG) 100 UNIT/ML solution pen-injector, Per sliding scale, 1-10 units with meals, Max 30 units daily (Patient not taking: Reported on 5/12/2023), Disp: 6 mL, Rfl: 2  •  ipratropium-albuterol (DUO-NEB) 0.5-2.5 mg/3 ml nebulizer, Take 3 mL by nebulization 4 (Four) Times a Day. (Patient not taking: Reported on 5/12/2023), Disp: 360 mL, Rfl: 0  •  methylcellulose (Citrucel) oral powder, Take 2 application by mouth Daily. (Patient not taking: Reported on 5/12/2023), Disp: 850 g, Rfl: 1  •  methylPREDNISolone (MEDROL) 4 MG dose pack, Take as directed on package instructions., Disp: 21 tablet, Rfl: 0  •  ondansetron (ZOFRAN) 4 MG tablet, TAKE ONE TABLET BY MOUTH EVERY 8 HOURS AS NEEDED FOR NAUSEA AND VOMITING (Patient not taking: Reported on 5/12/2023), Disp: 15 tablet, Rfl: 0  •  sodium chloride (NS) 0.9 % irrigation, Irrigate with 250 mL to the affected area as directed by provider Daily. Cleanse wound daily with sterile water. (Patient not taking: Reported on 5/12/2023), Disp: , Rfl:   •  triamcinolone (KENALOG) 0.1 % ointment, Apply  topically to the appropriate area as directed 2 (Two) Times a Day., Disp: 30 g, Rfl: 0    Current Facility-Administered Medications:   •  heparin injection 500 Units, 5 mL, Intravenous, PRN, Chloé, Vidal, APRN  •  sodium chloride 0.9 % flush 10 mL, 10 mL, Intravenous, Q12H, Chloé, Vidal, APRN  •  sodium chloride 0.9 % flush 10 mL, 10 mL, Intravenous, PRN, Chloé, Vidal, APRN  •  sodium chloride 0.9 % flush 20 mL, 20 mL, Intravenous, PRN, Chloé, Vidal, APRN  •  sterile water irrigation solution 500 mL, 500 mL, Irrigation, Once, Damari Cornejo PA-C     Allergies   Allergen Reactions   • Amoxicillin Shortness Of Breath   • Ceclor [Cefaclor] Shortness Of Breath   • Penicillins Shortness Of Breath   • Sulfa Antibiotics Rash   • Valium [Diazepam] Mental Status Change   •  "Ambien [Zolpidem] Unknown - Low Severity   • Aspirin GI Intolerance   • Ativan [Lorazepam] Unknown - Low Severity   • Benadryl [Diphenhydramine] Unknown - Low Severity   • Biaxin [Clarithromycin] Unknown - Low Severity   • Cefazolin Unknown - Low Severity   • Cephalexin Unknown - Low Severity   • Cephalosporins Unknown - Low Severity   • Clindamycin Unknown - Low Severity   • Compazine [Prochlorperazine Edisylate] Rash   • Contrast Dye (Echo Or Unknown Ct/Mr) Other (See Comments)     Caused pain in her arm   • Doxycycline Rash   • Eggs Or Egg-Derived Products Unknown - Low Severity   • Nsaids Unknown - Low Severity   • Phenergan [Promethazine Hcl] Rash   • Promethazine Unknown - Low Severity   • Vancomycin Itching       Family History   Problem Relation Age of Onset   • Anxiety disorder Mother    • Depression Sister    • Bipolar disorder Sister    • Pancreatic cancer Sister    • ADD / ADHD Brother    • Pancreatic cancer Paternal Grandmother    • ADD / ADHD Granddaughter    • ADD / ADHD Grandson    • ADD / ADHD Nephew    • Malig Hyperthermia Neg Hx         Social History     Social History Narrative   • Not on file       Objective     Vital Signs:   Pulse 83   Temp 97.5 °F (36.4 °C) (Tympanic)   Resp 22   Ht 167.6 cm (66\")   BMI 26.31 kg/m²       Physical Exam    Flexible laryngoscopy    Date/Time: 5/15/2023 10:48 AM  Performed by: Ronnie Mcgarry MD  Authorized by: Ronnie Mcgarry MD     Procedure details:     Indications: evaluation of larynx and hoarseness, dysphagia, or aspiration      Medication:  Afrin    Instrument: flexible fiberoptic laryngoscope      Scope location: right nare    Comments:      Nasal cavities free of any lesions, polyps, or purulence.  The epiglottis appears to have an ulcerative lesion which encompasses almost the entire epiglottis and appears grossly consistent with squamous cell carcinoma.  Vocal fold mobility is normal.  Vocal folds do appear chronically inflamed. "         Constitutional   Appearance  · : well developed, well-nourished, alert and in no acute distress, voice hoarse, gravelly    Head  Inspection  · : no deformities or lesions  Face  Inspection  · : No facial lesions; House-Brackmann I/VI bilaterally  Palpation  · : No TMJ crepitus nor  muscle tenderness bilaterally    Eyes  Vision  Visual Fields  · : Extraocular movements are intact. No spontaneous or gaze-induced nystagmus.  Conjunctivae  · : clear  Sclerae  · : clear  Pupils and Irises  · : pupils equal, round, and reactive to light.     Ears, Nose, Mouth and Throat    Ears    External Ears  · : appearance within normal limits, no lesions present  Otoscopic Examination  · : Tympanic membrane appearance within normal limits bilaterally without perforations, well-aerated middle ears  Hearing  · : intact to conversational voice both ears  Tunning fork testing:     :    Nose    External Nose  · : appearance normal  Intranasal Exam  · : mucosa within normal limits, vestibules normal, no intranasal lesions present, septum midline, sinuses non tender to percussion  Oral Cavity    Oral Mucosa  · : oral mucosa normal without pallor or cyanosis  Lips  · : lip appearance normal  Teeth  · : normal dentition for age  Gums  · : gums pink, non-swollen, no bleeding present  Tongue  · : tongue appearance normal; normal mobility  Palate  · : hard palate normal, soft palate appearance normal with symmetric mobility    Throat    Oropharynx  · : no inflammation or lesions present, tonsils within normal limits  Hypopharynx  · : appearance within normal limits, superior epiglottis within normal limits  Larynx  · : appearance within normal limits, vocal cords within normal limits, no lesions present    Neck  Inspection/Palpation  · : normal appearance, no masses or tenderness, trachea midline; thyroid size normal, nontender, no nodules or masses present on palpation    Respiratory  Respiratory Effort  · : breathing  unlabored  Inspection of Chest  · : normal appearance, no retractions    Cardiovascular  Heart  · : regular rate and rhythm    Lymphatic  Neck  · : no lymphadenopathy present  Supraclavicular Nodes  · : no lymphadenopathy present  Preauricular Nodes  · : no lymphadenopathy present    Skin and Subcutaneous Tissue  General Inspection  · : Regarding face and neck - there are no rashes present, no lesions present, and no areas of discoloration    Neurologic  Cranial Nerves  · : cranial nerves II-XII are grossly intact bilaterally  Gait and Station  · : normal gait, able to stand without diffculty    Psychiatric  Judgement and Insight  · : judgment and insight intact  Mood and Affect  · : mood normal, affect appropriate          Assessment and Plan    Diagnoses and all orders for this visit:    1. Tobacco abuse (Primary)  -     Case Request; Standing  -     Case Request    2. Voice hoarseness  -     Case Request; Standing  -     Case Request    3. Throat pain  -     Case Request; Standing  -     Case Request    4. Laryngeal cancer  -     Case Request; Standing  -     Case Request    Other orders  -     Follow Anesthesia Guidelines / Protocol; Future  -     Follow Anesthesia Guidelines / Protocol; Standing  -     Verify NPO Status; Standing  -     Obtain Informed Consent; Standing  -     $ Laryngoscopy    Examination today revealed hoarse gravelly voice.  Neck exam was normal.  Nasolaryngoscopy was performed and there is what appears to be an erosive lesion along the epiglottis that is likely squamous cell carcinoma given the full history.  This is the area that appears hypermetabolic on the PET scan and I reviewed imaging with her today.  I spoke to her about these findings and recommended proceeding to the operating room for direct laryngoscopy, esophagoscopy, bronchoscopy, and biopsies to obtain tissue diagnosis and assess the extent of disease.  I spoke to her about the procedure at length, including the possible  complications and alternatives.  She understands, and would like to proceed.  I will make arrangements to have her scheduled for this in the near future.    Follow Up   No follow-ups on file.  Patient was given instructions and counseling regarding her condition or for health maintenance advice. Please see specific information pulled into the AVS if appropriate.

## 2023-05-15 NOTE — H&P (VIEW-ONLY)
Patient Name: Isha Llanes   Visit Date: 05/12/2023   Patient ID: [id number]  Provider: Ronnie Mcgarry MD    Sex: female  Location: [facility]   YOB: 1965  Location Address: [address] [place] [address], [address],?[place]?[address]    Primary Care Provider Vidal Luevano APRN  Location Phone: [phone]    Referring Provider: Jeaneth Haywood MD        Chief Complaint  abnormal PET scan  (New patient )    Subjective    History of Present Illness  Isha Llanes is a 57 y.o. female who presents to [facility] today as a consult from Jeaneth Haywood MD.    She presents to the clinic today for evaluation of over 6-month history of voice hoarseness with progressive worsening, throat pain, pain with swallowing, and recent PET scan consistent with a hypermetabolic lesion along the vallecula and the base of tongue with an SUV of 5.1 along the right.  I personally reviewed the imaging with her and discussed the findings and results.  She is a lifelong smoker and continues to smoke over 1 pack/day.  She does have a complicated medical history and has had lymphoma with recurrence in 2008 and 2012.  She is supposedly disease-free at this point.  She has had chemoradiation therapy and is established with hematology oncology and radiation oncology who continue to follow her.  In reviewing her medical history she has significant other medical problems, and is currently wheelchair-bound.  She informs me that voice hoarseness and throat pain have been there for quite some time and do seem to be worsening and bothering her more.  She denies any major weight loss.  Unfortunately she has continued to smoke despite these issues.    Past Medical History:   Diagnosis Date   • Anxiety     NO CURRENT MEDICATION   • Aortic stenosis, severe    • Arthritis    • Asthma    • Back pain    • Blood clotting disorder    • Cancer associated pain    • Chronic low back pain  with sciatica    • Chronic pain disorder    • Diabetes mellitus     TYPE 2   • Dyspnea on effort    • GERD (gastroesophageal reflux disease)    • H/O lumpectomy    • H/O: hysterectomy    • Hiatal hernia    • History of degenerative disc disease    • History of kidney stones    • History of pulmonary embolus (PE)    • History of transfusion    • Hodgkin's lymphoma    • Immobility    • Liver disease    • Lupus    • Mitral valve prolapse    • Panic disorder    • Pelvic pain    • Peripheral neuropathy    • Personal history of DVT (deep vein thrombosis)    • Presence of intrathecal pump    • PTSD (post-traumatic stress disorder)    • Sacroiliac joint disease    • SI (sacroiliac) joint inflammation    • Venous insufficiency        Past Surgical History:   Procedure Laterality Date   • BACK SURGERY      SPINAL FUSION    • BLADDER REPAIR     • CARDIAC CATHETERIZATION N/A 3/6/2019    Procedure: Valvuloplasty;  Surgeon: Wayne Johnson MD;  Location: [facility] INVASIVE LOCATION;  Service: Cardiology   • CARDIAC VALVE REPLACEMENT  2021   • CHOLECYSTECTOMY     • COLONOSCOPY N/A 12/29/2021    Procedure: COLONOSCOPY;  Surgeon: Karine Orlando MD;  Location: [facility] ENDOSCOPY;  Service: Gastroenterology;  Laterality: N/A;  POOR PREP   • COLONOSCOPY N/A 4/13/2022    Procedure: COLONOSCOPY WITH POLYPECTOMY;  Surgeon: Karine Orlando MD;  Location: [facility] ENDOSCOPY;  Service: Gastroenterology;  Laterality: N/A;  COLON POLYP, HEMORRHOIDS, POOR PREP   • ENDOSCOPY N/A 12/29/2021    Procedure: ESOPHAGOGASTRODUODENOSCOPY;  Surgeon: Karine Orlando MD;  Location: [facility] ENDOSCOPY;  Service: Gastroenterology;  Laterality: N/A;  ESOPHAGITIS, GASTRITIS, HIATAL HERNIA   • HYSTERECTOMY     • LYMPHADENECTOMY     • PACEMAKER IMPLANTATION     • PAIN PUMP INSERTION/REVISION N/A 10/18/2019    Procedure: PAIN PUMP INSERTION [facility] 10-18-19 [facility];  Surgeon: Horace Rios MD;  Location: [facility] OR;  Service:  "Pain Management   • PAIN PUMP INSERTION/REVISION N/A 11/23/2020    Procedure: pain pump removal;  Surgeon: Horace Rios MD;  Location: [facility] OR;  Service: Pain Management;  Laterality: N/A;   • TONSILLECTOMY     • TUBAL ABDOMINAL LIGATION     • TUMOR REMOVAL           Current Outpatient Medications:   •  acetaminophen (TYLENOL) 500 MG tablet, Take 2-3 tablets by mouth Every 6 (Six) Hours As Needed for Mild Pain., Disp: 60 tablet, Rfl: 3  •  albuterol sulfate  (90 Base) MCG/ACT inhaler, inhale TWO puffs BY MOUTH EVERY 4 HOURS AS NEEDED for wheezing, Disp: 8.5 g, Rfl: 2  •  B-D UF III MINI PEN NEEDLES 31G X 5 MM misc, Inject 100 each under the skin into the appropriate area as directed 4 (Four) Times a Day., Disp: 100 each, Rfl: 12  •  bacitracin 500 UNIT/GM ointment, Apply 1 application topically to the appropriate area as directed 2 (Two) Times a Day., Disp: 28 g, Rfl: 5  •  Blood Glucose Monitoring Suppl (ONE TOUCH ULTRA 2) w/Device kit, 3 (Three) Times a Day. as directed, Disp: , Rfl:   •  clopidogrel (Plavix) 75 MG tablet, Take 1 tablet by mouth Daily., Disp: 90 tablet, Rfl: 3  •  Continuous Glucose Monitor Sup kit, 1 kit Continuous. One continuous glucose meter, and supplies needed. Give one month worth of supplies, with 3 refills. ICD-10: e11.9, Disp: 1 kit, Rfl: 3  •  Elastic Bandages & Supports (ACE Elastic Bandage 4\") misc, 1 Device Daily. One on each leg, bilateral.   #24 = one month supply., Disp: 24 each, Rfl: 3  •  erythromycin (ROMYCIN) 5 MG/GM ophthalmic ointment, Administer  to both eyes 2 (Two) Times a Day for 7 days., Disp: 1 g, Rfl: 0  •  FREESTYLE LITE test strip, by Other route 4 (Four) Times a Day. Use to test blood sugar 4 times daily, Disp: , Rfl:   •  furosemide (LASIX) 40 MG tablet, TAKE ONE TABLET BY MOUTH EVERY DAY, Disp: 90 tablet, Rfl: 1  •  gabapentin (NEURONTIN) 300 MG capsule, TAKE ONE CAPSULE BY MOUTH THREE TIMES DAILY (Patient taking differently: Take 1 capsule by " "mouth 4 (Four) Times a Day.), Disp: 270 capsule, Rfl: 1  •  glucose blood test strip, Use as instructed, Disp: 100 each, Rfl: 12  •  glucose monitor monitoring kit, 1 each As Needed (Test blood sugar TID)., Disp: 1 each, Rfl: 0  •  HYDROmorphone (DILAUDID) 1 mg/mL solution, Infuse  into a venous catheter Dose Adjusted By Provider As Needed. PER PAIN PUMP, Disp: , Rfl:   •  Insulin Syringe 31G X 5/16\" 1 ML misc, 1 each Daily., Disp: 100 each, Rfl: 2  •  Lancets misc, 1 each 3 (Three) Times a Day., Disp: 100 each, Rfl: 12  •  loperamide (IMODIUM) 2 MG capsule, Take 1 capsule by mouth 4 (Four) Times a Day As Needed for Diarrhea., Disp: 30 capsule, Rfl: 2  •  bacitracin 500 UNIT/GM ointment, Apply 1 application topically to the appropriate area as directed 2 (Two) Times a Day. (Patient not taking: Reported on 5/12/2023), Disp: 28 g, Rfl: 1  •  brompheniramine-pseudoephedrine-DM 30-2-10 MG/5ML syrup, Take 10 mL by mouth 4 Times a Day As Needed for Cough or Allergies. (Patient not taking: Reported on 5/12/2023), Disp: , Rfl:   •  cetirizine (zyrTEC) 10 MG tablet, Take 1 tablet by mouth Daily. (Patient not taking: Reported on 5/12/2023), Disp: 90 tablet, Rfl: 1  •  Continuous Blood Gluc Sensor (Dexcom G6 Sensor), See Admin Instructions. (Patient not taking: Reported on 5/12/2023), Disp: , Rfl:   •  Continuous Blood Gluc Transmit (Dexcom G6 Transmitter) misc, See Admin Instructions. (Patient not taking: Reported on 5/12/2023), Disp: , Rfl:   •  Elastic Bandages & Supports (Medical Compression Socks) misc, 1 Device Daily. bilateral compression socks (Patient not taking: Reported on 5/12/2023), Disp: 2 each, Rfl: 1  •  EPINEPHrine (EPIPEN) 0.3 MG/0.3ML solution auto-injector injection, TAKE AS DIRECTED INCASE OF ALLERGIC REACTION. (Patient not taking: Reported on 5/12/2023), Disp: , Rfl:   •  insulin detemir (LEVEMIR) 100 UNIT/ML injection, Inject 10 Units under the skin into the appropriate area as directed Daily for 90 " days. (Patient not taking: Reported on 5/12/2023), Disp: 9 mL, Rfl: 3  •  Insulin Lispro, 1 Unit Dial, (HUMALOG) 100 UNIT/ML solution pen-injector, Per sliding scale, 1-10 units with meals, Max 30 units daily (Patient not taking: Reported on 5/12/2023), Disp: 6 mL, Rfl: 2  •  ipratropium-albuterol (DUO-NEB) 0.5-2.5 mg/3 ml nebulizer, Take 3 mL by nebulization 4 (Four) Times a Day. (Patient not taking: Reported on 5/12/2023), Disp: 360 mL, Rfl: 0  •  methylcellulose (Citrucel) oral powder, Take 2 application by mouth Daily. (Patient not taking: Reported on 5/12/2023), Disp: 850 g, Rfl: 1  •  methylPREDNISolone (MEDROL) 4 MG dose pack, Take as directed on package instructions., Disp: 21 tablet, Rfl: 0  •  ondansetron (ZOFRAN) 4 MG tablet, TAKE ONE TABLET BY MOUTH EVERY 8 HOURS AS NEEDED FOR NAUSEA AND VOMITING (Patient not taking: Reported on 5/12/2023), Disp: 15 tablet, Rfl: 0  •  sodium chloride (NS) 0.9 % irrigation, Irrigate with 250 mL to the affected area as directed by provider Daily. Cleanse wound daily with sterile water. (Patient not taking: Reported on 5/12/2023), Disp: , Rfl:   •  triamcinolone (KENALOG) 0.1 % ointment, Apply  topically to the appropriate area as directed 2 (Two) Times a Day., Disp: 30 g, Rfl: 0    Current Facility-Administered Medications:   •  heparin injection 500 Units, 5 mL, Intravenous, PRN, Chloé, Vidal, APRN  •  sodium chloride 0.9 % flush 10 mL, 10 mL, Intravenous, Q12H, Chloé, Vidal, APRN  •  sodium chloride 0.9 % flush 10 mL, 10 mL, Intravenous, PRN, Chloé, Vidal, APRN  •  sodium chloride 0.9 % flush 20 mL, 20 mL, Intravenous, PRN, Chloé, Vidal, APRN  •  sterile water irrigation solution 500 mL, 500 mL, Irrigation, Once, Damari Cornejo PA-C     Allergies   Allergen Reactions   • Amoxicillin Shortness Of Breath   • Ceclor [Cefaclor] Shortness Of Breath   • Penicillins Shortness Of Breath   • Sulfa Antibiotics Rash   • Valium [Diazepam] Mental Status Change   •  "Ambien [Zolpidem] Unknown - Low Severity   • Aspirin GI Intolerance   • Ativan [Lorazepam] Unknown - Low Severity   • Benadryl [Diphenhydramine] Unknown - Low Severity   • Biaxin [Clarithromycin] Unknown - Low Severity   • Cefazolin Unknown - Low Severity   • Cephalexin Unknown - Low Severity   • Cephalosporins Unknown - Low Severity   • Clindamycin Unknown - Low Severity   • Compazine [Prochlorperazine Edisylate] Rash   • Contrast Dye (Echo Or Unknown Ct/Mr) Other (See Comments)     Caused pain in her arm   • Doxycycline Rash   • Eggs Or Egg-Derived Products Unknown - Low Severity   • Nsaids Unknown - Low Severity   • Phenergan [Promethazine Hcl] Rash   • Promethazine Unknown - Low Severity   • Vancomycin Itching       Family History   Problem Relation Age of Onset   • Anxiety disorder Mother    • Depression Sister    • Bipolar disorder Sister    • Pancreatic cancer Sister    • ADD / ADHD Brother    • Pancreatic cancer Paternal Grandmother    • ADD / ADHD Granddaughter    • ADD / ADHD Grandson    • ADD / ADHD Nephew    • Malig Hyperthermia Neg Hx         Social History     Social History Narrative   • Not on file       Objective     Vital Signs:   Pulse 83   Temp 97.5 °F (36.4 °C) (Tympanic)   Resp 22   Ht 167.6 cm (66\")   BMI 26.31 kg/m²       Physical Exam    Flexible laryngoscopy    Date/Time: 5/15/2023 10:48 AM  Performed by: Ronnie Mcgarry MD  Authorized by: Ronnie Mcgarry MD     Procedure details:     Indications: evaluation of larynx and hoarseness, dysphagia, or aspiration      Medication:  Afrin    Instrument: flexible fiberoptic laryngoscope      Scope location: right nare    Comments:      Nasal cavities free of any lesions, polyps, or purulence.  The epiglottis appears to have an ulcerative lesion which encompasses almost the entire epiglottis and appears grossly consistent with squamous cell carcinoma.  Vocal fold mobility is normal.  Vocal folds do appear chronically inflamed. "         Constitutional   Appearance  · : well developed, well-nourished, alert and in no acute distress, voice hoarse, gravelly    Head  Inspection  · : no deformities or lesions  Face  Inspection  · : No facial lesions; House-Brackmann I/VI bilaterally  Palpation  · : No TMJ crepitus nor  muscle tenderness bilaterally    Eyes  Vision  Visual Fields  · : Extraocular movements are intact. No spontaneous or gaze-induced nystagmus.  Conjunctivae  · : clear  Sclerae  · : clear  Pupils and Irises  · : pupils equal, round, and reactive to light.     Ears, Nose, Mouth and Throat    Ears    External Ears  · : appearance within normal limits, no lesions present  Otoscopic Examination  · : Tympanic membrane appearance within normal limits bilaterally without perforations, well-aerated middle ears  Hearing  · : intact to conversational voice both ears  Tunning fork testing:     :    Nose    External Nose  · : appearance normal  Intranasal Exam  · : mucosa within normal limits, vestibules normal, no intranasal lesions present, septum midline, sinuses non tender to percussion  Oral Cavity    Oral Mucosa  · : oral mucosa normal without pallor or cyanosis  Lips  · : lip appearance normal  Teeth  · : normal dentition for age  Gums  · : gums pink, non-swollen, no bleeding present  Tongue  · : tongue appearance normal; normal mobility  Palate  · : hard palate normal, soft palate appearance normal with symmetric mobility    Throat    Oropharynx  · : no inflammation or lesions present, tonsils within normal limits  Hypopharynx  · : appearance within normal limits, superior epiglottis within normal limits  Larynx  · : appearance within normal limits, vocal cords within normal limits, no lesions present    Neck  Inspection/Palpation  · : normal appearance, no masses or tenderness, trachea midline; thyroid size normal, nontender, no nodules or masses present on palpation    Respiratory  Respiratory Effort  · : breathing  unlabored  Inspection of Chest  · : normal appearance, no retractions    Cardiovascular  Heart  · : regular rate and rhythm    Lymphatic  Neck  · : no lymphadenopathy present  Supraclavicular Nodes  · : no lymphadenopathy present  Preauricular Nodes  · : no lymphadenopathy present    Skin and Subcutaneous Tissue  General Inspection  · : Regarding face and neck - there are no rashes present, no lesions present, and no areas of discoloration    Neurologic  Cranial Nerves  · : cranial nerves II-XII are grossly intact bilaterally  Gait and Station  · : normal gait, able to stand without diffculty    Psychiatric  Judgement and Insight  · : judgment and insight intact  Mood and Affect  · : mood normal, affect appropriate          Assessment and Plan    Diagnoses and all orders for this visit:    1. Tobacco abuse (Primary)  -     Case Request; Standing  -     Case Request    2. Voice hoarseness  -     Case Request; Standing  -     Case Request    3. Throat pain  -     Case Request; Standing  -     Case Request    4. Laryngeal cancer  -     Case Request; Standing  -     Case Request    Other orders  -     Follow Anesthesia Guidelines / Protocol; Future  -     Follow Anesthesia Guidelines / Protocol; Standing  -     Verify NPO Status; Standing  -     Obtain Informed Consent; Standing  -     $ Laryngoscopy    Examination today revealed hoarse gravelly voice.  Neck exam was normal.  Nasolaryngoscopy was performed and there is what appears to be an erosive lesion along the epiglottis that is likely squamous cell carcinoma given the full history.  This is the area that appears hypermetabolic on the PET scan and I reviewed imaging with her today.  I spoke to her about these findings and recommended proceeding to the operating room for direct laryngoscopy, esophagoscopy, bronchoscopy, and biopsies to obtain tissue diagnosis and assess the extent of disease.  I spoke to her about the procedure at length, including the possible  complications and alternatives.  She understands, and would like to proceed.  I will make arrangements to have her scheduled for this in the near future.    Follow Up   No follow-ups on file.  Patient was given instructions and counseling regarding her condition or for health maintenance advice. Please see specific information pulled into the AVS if appropriate.

## 2023-05-16 ENCOUNTER — TELEPHONE (OUTPATIENT)
Dept: FAMILY MEDICINE CLINIC | Facility: CLINIC | Age: 58
End: 2023-05-16
Payer: COMMERCIAL

## 2023-05-16 NOTE — TELEPHONE ENCOUNTER
Caller: Isha Llanes    Relationship to patient: Self    Best call back number: [phone]     Patient is needing: PATIENT WOULD LIKE TO ADVISE SHE HAS NOT RECEIVE THE MACHINE ORDERED FOR HER LEGS. SHE IS UNSURE OF HOW TO FIND OUT ABOUT THE STATUS OF WHEN SHE SHOULD BE GETTING THAT.

## 2023-05-16 NOTE — TELEPHONE ENCOUNTER
Caller: Isha Llanes    Relationship: Self    Best call back number:[phone]    What medication are you requesting: SOMETHING TO NUMB THE THROAT    What are your current symptoms: SEVERE PAIN IN BACK OF THROAT UNABLE TO EAT OR DRINK    How long have you been experiencing symptoms: WORSENED IN THE LAST FEW DAYS    Have you had these symptoms before:    [] Yes  [x] No    Have you been treated for these symptoms before:   [] Yes  [x] No    If a prescription is needed, what is your preferred pharmacy and phone number:      [facility] - [place], [place] - [address] [facility] [phone] [facility] [phone] FX     Additional notes: PATIENT STATES WHEN ENT DID PROCEDURE WITH CAMERA TO LOOK AT THROAT HER SYMPTOMS HAVE GOTTEN WORSE.

## 2023-05-16 NOTE — TELEPHONE ENCOUNTER
Caller: Isha Llanes    Relationship to patient: Self    Best call back number: [phone]     Patient is needing: PATIENT HAD TO CANCEL APPOINTMENT SCHEDULED ON 5.19.2023 DUE TO CONFLICTING APPOINTMENTS. PATIENT STATES SHE NEEDS TO BE SEEN. I ADVISED PATIENT OF NEXT AVAILABLE APPOINTMENT ON 6.22.2023. PATIENT STATED SHE HAS TO BE SEEN SOONER THEN THAT. PLEASE ADVISE

## 2023-05-17 NOTE — TELEPHONE ENCOUNTER
Called [employer] at [phone] and left voicemail for them to contact us back to get clarification on status of lymphedema machine.

## 2023-05-17 NOTE — TELEPHONE ENCOUNTER
Patient stated she hasnt had the procedure yet that its on the 22nd and that she is on a limited income and cant afford to buy it but can get it as a prescription. If possible. Please advise.

## 2023-05-18 ENCOUNTER — TELEPHONE (OUTPATIENT)
Dept: ONCOLOGY | Facility: HOSPITAL | Age: 58
End: 2023-05-18
Payer: COMMERCIAL

## 2023-05-18 DIAGNOSIS — F41.9 ANXIETY: ICD-10-CM

## 2023-05-18 DIAGNOSIS — F32.A DEPRESSION, UNSPECIFIED DEPRESSION TYPE: Primary | ICD-10-CM

## 2023-05-18 RX ORDER — MENTHOL/CETYLPYRD CL 3 MG
1 LOZENGE MUCOUS MEMBRANE AS NEEDED
Qty: 9 EACH | Refills: 2 | Status: SHIPPED | OUTPATIENT
Start: 2023-05-18 | End: 2023-05-19

## 2023-05-18 NOTE — TELEPHONE ENCOUNTER
Caller: Isha Llanes    Relationship: Self    Best call back number: [phone]    What is the best time to reach you: ANYTIME    Who are you requesting to speak with (clinical staff, provider, specific staff member): CLINICAL TEAM    What was the call regarding: PT IS HAVING TROUBLE SLEEPING, HER NERVES ARE A MESS OVER HER DIAGNOSIS AND SHE NEEDS SOMETHING FOR HER NERVES TO CALM HER DOWN.     PLEASE CALL PT ASAP TO ADVISE.     PHARMACY: [facility] - [place], [place] - [address] [facility] - [phone] [facility] [phone] FX  [phone]    Do you require a callback: YES

## 2023-05-18 NOTE — TELEPHONE ENCOUNTER
"Spoke with patient, advised that Dr. Haywood states that she will need to see behavioral health regarding her anxiety due to its extent. She became emotional stating \"no one cares that I am anxious with this possible diagnosis.\" Advised that if she did not agree she could speak with either her PCP or the ENT provider who has scheduled the biopsy. She then disconnected the line.    "

## 2023-05-19 ENCOUNTER — PRE-ADMISSION TESTING (OUTPATIENT)
Dept: PREADMISSION TESTING | Facility: HOSPITAL | Age: 58
End: 2023-05-19
Payer: COMMERCIAL

## 2023-05-19 ENCOUNTER — ANESTHESIA EVENT (OUTPATIENT)
Dept: PERIOP | Facility: HOSPITAL | Age: 58
End: 2023-05-19
Payer: COMMERCIAL

## 2023-05-19 ENCOUNTER — TELEPHONE (OUTPATIENT)
Dept: CASE MANAGEMENT | Facility: OTHER | Age: 58
End: 2023-05-19
Payer: COMMERCIAL

## 2023-05-19 VITALS
BODY MASS INDEX: 25.97 KG/M2 | WEIGHT: 161.6 LBS | HEART RATE: 98 BPM | RESPIRATION RATE: 18 BRPM | SYSTOLIC BLOOD PRESSURE: 132 MMHG | OXYGEN SATURATION: 93 % | TEMPERATURE: 97.1 F | DIASTOLIC BLOOD PRESSURE: 76 MMHG | HEIGHT: 66 IN

## 2023-05-19 DIAGNOSIS — Z01.818 PRE-OP TESTING: Primary | ICD-10-CM

## 2023-05-19 LAB
ANION GAP SERPL CALCULATED.3IONS-SCNC: 7.3 MMOL/L (ref 5–15)
BUN SERPL-MCNC: 13 MG/DL (ref 6–20)
BUN/CREAT SERPL: 24.5 (ref 7–25)
CALCIUM SPEC-SCNC: 9.1 MG/DL (ref 8.6–10.5)
CHLORIDE SERPL-SCNC: 99 MMOL/L (ref 98–107)
CO2 SERPL-SCNC: 29.7 MMOL/L (ref 22–29)
CREAT SERPL-MCNC: 0.53 MG/DL (ref 0.57–1)
EGFRCR SERPLBLD CKD-EPI 2021: 108 ML/MIN/1.73
GLUCOSE SERPL-MCNC: 127 MG/DL (ref 65–99)
POTASSIUM SERPL-SCNC: 4.1 MMOL/L (ref 3.5–5.2)
SODIUM SERPL-SCNC: 136 MMOL/L (ref 136–145)

## 2023-05-19 PROCEDURE — 36415 COLL VENOUS BLD VENIPUNCTURE: CPT

## 2023-05-19 PROCEDURE — 80048 BASIC METABOLIC PNL TOTAL CA: CPT

## 2023-05-19 NOTE — DISCHARGE INSTRUCTIONS
IMPORTANT INSTRUCTIONS - PRE-ADMISSION TESTING  DO NOT EAT OR CHEW anything after midnight the night before your procedure.    You may have CLEAR liquids up to __2____ hours prior to ARRIVAL time.   Take the following medications the morning of your procedure with JUST A SIP OF WATER:  _____TYLENOL IF NEEDED, ALBUTEROL INHALER IF NEEDED AND BRING WITH YOU, GABAPENTIN, ONLY 1/2 THE DOSE OF OF LEVEMIR YOU WOULD NORMALLY TAKE__________________________________________________________________________________________________________________________________________________________________________________    DO NOT BRING your medications to the hospital with you, UNLESS something has changed since your PRE-Admission Testing appointment.  STARTING NOW Hold all vitamins, supplements, and NSAIDS (Non- steroidal anti-inflammatory meds) for one week prior to surgery (you MAY take Tylenol or Acetaminophen).  If you are diabetic, check your blood sugar the morning of your procedure. If it is less than 70 or if you are feeling symptomatic, call the following number for further instructions: [phone]______.  Use your inhalers/nebulizers as usual, the morning of your procedure. BRING YOUR INHALERS with you.   Bring your CPAP or BIPAP to hospital, ONLY IF YOU WILL BE SPENDING THE NIGHT.   Make sure you have a ride home and have someone who will stay with you the day of your procedure after you go home.  If you have any questions, please call your Pre-Admission Testing Nurse, ___VERONICA_____________ at [phone]_________.   Per anesthesia request, do not smoke for 24 hours before your procedure or as instructed by your surgeon.     NO JEWELRY OF ANY TYPE OR NAIL POLISH UPPER OR LOWER EXT DAY OF SURGERY  NO SMOKING 24 HOURS PRIOR TO ARRIVAL  DAY OF PROCEDURE HAVE TACK BRING YOU TO ENTRANCE C [facility]

## 2023-05-19 NOTE — TELEPHONE ENCOUNTER
Called [employer] again and got voicemail this time. Left message for someone to call back to check the status of the lymphedema machine.

## 2023-05-19 NOTE — TELEPHONE ENCOUNTER
90 Day Chart Review -     Last CCM Contact - 11/28/22     No ER/UC Vists since Last Contact     Continues to Follow Up w/ PCP office for needs     Sees Heme/Onc for active surveillance of Hodgkin's Lymphoma

## 2023-05-22 ENCOUNTER — HOSPITAL ENCOUNTER (OUTPATIENT)
Facility: HOSPITAL | Age: 58
Setting detail: HOSPITAL OUTPATIENT SURGERY
Discharge: HOME | End: 2023-05-22
Attending: OTOLARYNGOLOGY | Admitting: OTOLARYNGOLOGY
Payer: COMMERCIAL

## 2023-05-22 ENCOUNTER — ANESTHESIA (OUTPATIENT)
Dept: PERIOP | Facility: HOSPITAL | Age: 58
End: 2023-05-22
Payer: COMMERCIAL

## 2023-05-22 VITALS
SYSTOLIC BLOOD PRESSURE: 110 MMHG | HEART RATE: 94 BPM | BODY MASS INDEX: 25.9 KG/M2 | TEMPERATURE: 96.9 F | RESPIRATION RATE: 24 BRPM | OXYGEN SATURATION: 95 % | WEIGHT: 161.16 LBS | DIASTOLIC BLOOD PRESSURE: 67 MMHG | HEIGHT: 66 IN

## 2023-05-22 DIAGNOSIS — C32.9 LARYNGEAL CANCER: ICD-10-CM

## 2023-05-22 DIAGNOSIS — R49.0 VOICE HOARSENESS: ICD-10-CM

## 2023-05-22 DIAGNOSIS — Z72.0 TOBACCO ABUSE: ICD-10-CM

## 2023-05-22 DIAGNOSIS — R07.0 THROAT PAIN: ICD-10-CM

## 2023-05-22 LAB
GLUCOSE BLDC GLUCOMTR-MCNC: 110 MG/DL (ref 70–99)
GLUCOSE BLDC GLUCOMTR-MCNC: 132 MG/DL (ref 70–99)

## 2023-05-22 PROCEDURE — 25010000002 ONDANSETRON PER 1 MG: Performed by: NURSE ANESTHETIST, CERTIFIED REGISTERED

## 2023-05-22 PROCEDURE — 43200 ESOPHAGOSCOPY FLEXIBLE BRUSH: CPT | Performed by: OTOLARYNGOLOGY

## 2023-05-22 PROCEDURE — 31536 LARYNGOSCOPY W/BX & OP SCOPE: CPT | Performed by: OTOLARYNGOLOGY

## 2023-05-22 PROCEDURE — 31622 DX BRONCHOSCOPE/WASH: CPT | Performed by: OTOLARYNGOLOGY

## 2023-05-22 PROCEDURE — 88305 TISSUE EXAM BY PATHOLOGIST: CPT | Performed by: OTOLARYNGOLOGY

## 2023-05-22 PROCEDURE — 25010000002 SUGAMMADEX 200 MG/2ML SOLUTION: Performed by: NURSE ANESTHETIST, CERTIFIED REGISTERED

## 2023-05-22 PROCEDURE — 25010000002 FENTANYL CITRATE (PF) 50 MCG/ML SOLUTION: Performed by: NURSE ANESTHETIST, CERTIFIED REGISTERED

## 2023-05-22 PROCEDURE — 25010000002 MIDAZOLAM PER 1MG: Performed by: ANESTHESIOLOGY

## 2023-05-22 PROCEDURE — 25010000002 DEXAMETHASONE PER 1 MG: Performed by: NURSE ANESTHETIST, CERTIFIED REGISTERED

## 2023-05-22 PROCEDURE — 25010000002 HYDROMORPHONE 1 MG/ML SOLUTION: Performed by: NURSE ANESTHETIST, CERTIFIED REGISTERED

## 2023-05-22 PROCEDURE — 25010000002 PROPOFOL 10 MG/ML EMULSION: Performed by: NURSE ANESTHETIST, CERTIFIED REGISTERED

## 2023-05-22 PROCEDURE — 82948 REAGENT STRIP/BLOOD GLUCOSE: CPT

## 2023-05-22 RX ORDER — OXYCODONE HYDROCHLORIDE 5 MG/1
5 TABLET ORAL
Status: DISCONTINUED | OUTPATIENT
Start: 2023-05-22 | End: 2023-05-22 | Stop reason: HOSPADM

## 2023-05-22 RX ORDER — DEXAMETHASONE SODIUM PHOSPHATE 4 MG/ML
INJECTION, SOLUTION INTRA-ARTICULAR; INTRALESIONAL; INTRAMUSCULAR; INTRAVENOUS; SOFT TISSUE AS NEEDED
Status: DISCONTINUED | OUTPATIENT
Start: 2023-05-22 | End: 2023-05-22 | Stop reason: SURG

## 2023-05-22 RX ORDER — LOPERAMIDE HYDROCHLORIDE 2 MG/1
CAPSULE ORAL
Qty: 30 CAPSULE | Refills: 2 | OUTPATIENT
Start: 2023-05-22

## 2023-05-22 RX ORDER — LIDOCAINE HYDROCHLORIDE 20 MG/ML
INJECTION, SOLUTION EPIDURAL; INFILTRATION; INTRACAUDAL; PERINEURAL AS NEEDED
Status: DISCONTINUED | OUTPATIENT
Start: 2023-05-22 | End: 2023-05-22 | Stop reason: SURG

## 2023-05-22 RX ORDER — SODIUM CHLORIDE, SODIUM LACTATE, POTASSIUM CHLORIDE, CALCIUM CHLORIDE 600; 310; 30; 20 MG/100ML; MG/100ML; MG/100ML; MG/100ML
9 INJECTION, SOLUTION INTRAVENOUS CONTINUOUS PRN
Status: DISCONTINUED | OUTPATIENT
Start: 2023-05-22 | End: 2023-05-22 | Stop reason: HOSPADM

## 2023-05-22 RX ORDER — MEPERIDINE HYDROCHLORIDE 25 MG/ML
12.5 INJECTION INTRAMUSCULAR; INTRAVENOUS; SUBCUTANEOUS
Status: DISCONTINUED | OUTPATIENT
Start: 2023-05-22 | End: 2023-05-22 | Stop reason: HOSPADM

## 2023-05-22 RX ORDER — OXYMETAZOLINE HYDROCHLORIDE 0.05 G/100ML
SPRAY NASAL AS NEEDED
Status: DISCONTINUED | OUTPATIENT
Start: 2023-05-22 | End: 2023-05-22 | Stop reason: HOSPADM

## 2023-05-22 RX ORDER — PROPOFOL 10 MG/ML
VIAL (ML) INTRAVENOUS AS NEEDED
Status: DISCONTINUED | OUTPATIENT
Start: 2023-05-22 | End: 2023-05-22 | Stop reason: SURG

## 2023-05-22 RX ORDER — MIDAZOLAM HYDROCHLORIDE 2 MG/2ML
2 INJECTION, SOLUTION INTRAMUSCULAR; INTRAVENOUS ONCE
Status: COMPLETED | OUTPATIENT
Start: 2023-05-22 | End: 2023-05-22

## 2023-05-22 RX ORDER — ONDANSETRON 2 MG/ML
INJECTION INTRAMUSCULAR; INTRAVENOUS AS NEEDED
Status: DISCONTINUED | OUTPATIENT
Start: 2023-05-22 | End: 2023-05-22 | Stop reason: SURG

## 2023-05-22 RX ORDER — ACETAMINOPHEN 500 MG
1000 TABLET ORAL ONCE
Status: COMPLETED | OUTPATIENT
Start: 2023-05-22 | End: 2023-05-22

## 2023-05-22 RX ORDER — ROCURONIUM BROMIDE 10 MG/ML
INJECTION, SOLUTION INTRAVENOUS AS NEEDED
Status: DISCONTINUED | OUTPATIENT
Start: 2023-05-22 | End: 2023-05-22 | Stop reason: SURG

## 2023-05-22 RX ORDER — ONDANSETRON 2 MG/ML
4 INJECTION INTRAMUSCULAR; INTRAVENOUS ONCE AS NEEDED
Status: DISCONTINUED | OUTPATIENT
Start: 2023-05-22 | End: 2023-05-22 | Stop reason: HOSPADM

## 2023-05-22 RX ORDER — FENTANYL CITRATE 50 UG/ML
INJECTION, SOLUTION INTRAMUSCULAR; INTRAVENOUS AS NEEDED
Status: DISCONTINUED | OUTPATIENT
Start: 2023-05-22 | End: 2023-05-22 | Stop reason: SURG

## 2023-05-22 RX ADMIN — ONDANSETRON 4 MG: 2 INJECTION INTRAMUSCULAR; INTRAVENOUS at 13:05

## 2023-05-22 RX ADMIN — SUGAMMADEX 200 MG: 100 INJECTION, SOLUTION INTRAVENOUS at 13:25

## 2023-05-22 RX ADMIN — PROPOFOL 120 MG: 10 INJECTION, EMULSION INTRAVENOUS at 13:04

## 2023-05-22 RX ADMIN — PROPOFOL 150 MCG/KG/MIN: 10 INJECTION, EMULSION INTRAVENOUS at 13:04

## 2023-05-22 RX ADMIN — MIDAZOLAM HYDROCHLORIDE 2 MG: 1 INJECTION, SOLUTION INTRAMUSCULAR; INTRAVENOUS at 12:55

## 2023-05-22 RX ADMIN — FENTANYL CITRATE 100 MCG: 50 INJECTION, SOLUTION INTRAMUSCULAR; INTRAVENOUS at 13:04

## 2023-05-22 RX ADMIN — ACETAMINOPHEN 1000 MG: 500 TABLET ORAL at 12:56

## 2023-05-22 RX ADMIN — DEXAMETHASONE SODIUM PHOSPHATE 8 MG: 4 INJECTION, SOLUTION INTRA-ARTICULAR; INTRALESIONAL; INTRAMUSCULAR; INTRAVENOUS; SOFT TISSUE at 13:05

## 2023-05-22 RX ADMIN — LIDOCAINE HYDROCHLORIDE 60 MG: 20 INJECTION, SOLUTION EPIDURAL; INFILTRATION; INTRACAUDAL; PERINEURAL at 13:04

## 2023-05-22 RX ADMIN — SODIUM CHLORIDE, POTASSIUM CHLORIDE, SODIUM LACTATE AND CALCIUM CHLORIDE 9 ML/HR: 600; 310; 30; 20 INJECTION, SOLUTION INTRAVENOUS at 12:54

## 2023-05-22 RX ADMIN — ROCURONIUM BROMIDE 50 MG: 10 INJECTION, SOLUTION INTRAVENOUS at 13:05

## 2023-05-22 RX ADMIN — HYDROMORPHONE HYDROCHLORIDE 0.5 MG: 1 INJECTION, SOLUTION INTRAMUSCULAR; INTRAVENOUS; SUBCUTANEOUS at 13:43

## 2023-05-22 NOTE — DISCHARGE INSTRUCTIONS
DISCHARGE INSTRUCTIONS  QUADSCOPE      For your surgery you had:  General Anesthesia (you may have a sore throat for the first 24 hours)      You may experience dizziness, drowsiness, or lightheadedness for several hours following surgery/procedure.  Do not stay alone today or tonight.  Limit your activity for 24 hours.  You should not drive or operate machinery, drink alcohol, or sign legally binding documents for 24 hours or while you are taking pain medication.  Resume your diet slowly.  Follow any special dietary instructions you may have been given by your doctor.  Last dose of pain medication was given at:   .    NOTIFY YOUR DOCTOR IF YOU EXPERIENCE ANY OF THE FOLLOWING:  Temperature greater than 101 degrees Fahrenheit  Shaking Chills  Redness or excessive drainage from incision  Nausea, vomiting and/or pain that is not controlled by prescribed medications  Increase in bleeding or bleeding that is excessive  Unable to urinate in 6 hours after surgery  If unable to reach your doctor, please go to the closest Emergency Room Following your Quadscope, you may experience a mild sore throat or cough up small amounts of blood.  Extremes of either should be reported to your doctor.  If you have shortness of breath or chest pain, you should notify your doctor.      Medications per physician instructions as indicated on Discharge Medication Information Sheet.  You should see    for follow-up care  on   .  Phone number:         SPECIAL INSTRUCTIONS:                        1.  DC home  2.  Follow-up in ENT clinic next week on Thursday  3.  Tylenol OTC as needed pain  4.  Avoid spicy or salty foods 1 week  5.  No smoking  6.  Gargle with water after meals  7.  May restart Plavix on Wednesday

## 2023-05-22 NOTE — ANESTHESIA PREPROCEDURE EVALUATION
Anesthesia Evaluation     Patient summary reviewed and Nursing notes reviewed   no history of anesthetic complications:  NPO Solid Status: > 8 hours  NPO Liquid Status: > 2 hours           Airway   Mallampati: II  TM distance: >3 FB  Neck ROM: full  No difficulty expected  Dental    (+) edentulous    Pulmonary - normal exam    breath sounds clear to auscultation  (+) pulmonary embolism, COPD, asthma,shortness of breath,   Cardiovascular - normal exam  Exercise tolerance: good (4-7 METS)    ECG reviewed  Rhythm: regular  Rate: normal    (+) valvular problems/murmurs, CAD, CHF , PVD,       Neuro/Psych  (+) numbness, psychiatric history,    GI/Hepatic/Renal/Endo    (+)  hiatal hernia, GERD,  liver disease, renal disease, diabetes mellitus,     Musculoskeletal     (+) back pain, neck pain,   Abdominal    Substance History - negative use     OB/GYN negative ob/gyn ROS         Other   arthritis,    history of cancer    ROS/Med Hx Other: <4METS, SOAE, DECREASED MOBILITY. 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 ECHO 5/25/21 EF 60-65%, MOD MS, MILD MR. HX CAD, CHFM, MVP, DVT, HODGKINS LYMPHOMA, LUPUS, BLOOD CLOTTING DISORDER. KT                 Anesthesia Plan    ASA 4     general   total IV anesthesia  (Patient understands anesthesia not responsible for dental damage.    Pt states that she gets violent/emotional with volatile anesthetics.  Will proceed with propofol infusion)  intravenous induction     Anesthetic plan, risks, benefits, and alternatives have been provided, discussed and informed consent has been obtained with: patient.    Use of blood products discussed with patient .   Plan discussed with CRNA.        CODE STATUS:

## 2023-05-22 NOTE — ANESTHESIA POSTPROCEDURE EVALUATION
Patient: Isha Llanes    Procedure Summary     Date: 05/22/23 Room / Location: [facility]  /  [facility]    Anesthesia Start: 1258 Anesthesia Stop: 1337    Procedure: DIRECT LARYNGOSCOPY WITH BIOPSIES , ESOPHAGOSCOPY, BRONCHOSCOPY (Throat) Diagnosis:       Tobacco abuse      Voice hoarseness      Throat pain      Laryngeal cancer      (Tobacco abuse [Z72.0])      (Voice hoarseness [R49.0])      (Throat pain [R07.0])      (Laryngeal cancer [C32.9])    Surgeons: Ronnie Mcgarry MD Provider: Jose G Douglass MD    Anesthesia Type: general ASA Status: 4          Anesthesia Type: general    Vitals  Vitals Value Taken Time   /83 05/22/23 1455   Temp 36.2 °C (97.2 °F) 05/22/23 1335   Pulse 103 05/22/23 1458   Resp 24 05/22/23 1345   SpO2 90 % 05/22/23 1458   Vitals shown include unvalidated device data.        Post Anesthesia Care and Evaluation    Patient location during evaluation: bedside  Patient participation: complete - patient participated  Level of consciousness: awake  Pain score: 2  Pain management: adequate    Airway patency: patent  PONV Status: none  Cardiovascular status: acceptable and stable  Respiratory status: acceptable  Hydration status: acceptable    Comments: An Anesthesiologist personally participated in the most demanding procedures (including induction and emergence if applicable) in the anesthesia plan, monitored the course of anesthesia administration at frequent intervals and remained physically present and available for immediate diagnosis and treatment of emergencies.        Pt with complaints of back pain no real pain associated with areas of procedre

## 2023-05-22 NOTE — OP NOTE
DIRECT LARYNGOSCOPY, ESOPHAGOSCOPY, BRONCHOSCOPY  Procedure Report    Patient Name:  Isha Llanes  YOB: 1965    Date of Surgery:  5/22/2023    Pre-op Diagnosis:   Tobacco abuse [Z72.0]  Voice hoarseness [R49.0]  Throat pain [R07.0]  Laryngeal cancer [C32.9]       Post-Op Diagnosis Codes:     * Tobacco abuse [Z72.0]     * Voice hoarseness [R49.0]     * Throat pain [R07.0]     * Laryngeal cancer [C32.9]    Procedure/CPT® Codes:  02958  77993  08878    Procedure(s):  DIRECT LARYNGOSCOPY WITH BIOPSIES , ESOPHAGOSCOPY, BRONCHOSCOPY    Staff:  Surgeon(s):  Ronnie Mcgarry MD    Anesthesia: General    Estimated Blood Loss: 2 mL    Implants:    Nothing was implanted during the procedure    Specimen:          Specimens     ID Source Type Tests Collected By Collected At Frozen?    A Larynx Tissue · TISSUE PATHOLOGY EXAM   Ronnie Mcgarry MD 5/22/23 1315     Description: BIOPSIES LARYNGEAL SURFACE OF EPIGLOTTIS     This specimen was not marked as sent.        Findings:   1.  Erosive appearing lesion along the laryngeal surface of the epiglottis that essentially encompasses the entire laryngeal epiglottis  2.  Vocal folds edematous especially on the left, chronic laryngitis from smoking  3.  Secretions within the upper tracheobronchial tree without lesions  4.  Normal esophagoscopy    Complications: None    Description of Procedure:     The patient was brought to the operating room and placed in the supine position on the operating room table.  Mask inhalational anesthesia was induced, and she was intubated orotracheally without difficulty using a size 6 endotracheal tube.  Timeout was performed to identify the correct patient and procedure.      Next, a tooth guard was placed to protect the maxillary dentition.  The Izabella laryngoscope was introduced into the oral cavity, and passed down to the level of the larynx.  The scope was then suspended on a Dove stand, and magnification was used using  the Melbeta scope system.  The epiglottis appeared to have a large lesion that is superficially covered the entire laryngeal surface of the epiglottis with erosion in the midportion. Base of tongue appeared clear.  The vocal folds were inflamed, especially on the left side with chronic laryngitis appearance.  Large cup forceps were used to take several biopsies of the epiglottic lesion and this was sent to pathology.  Photodocumentation of the findings was obtained.      Next, the bronchoscope was passed through the vocal folds into the subglottic area.  The subglottic area was normal without any lesions or masses, and was not narrowed.  The endotracheal tube was taken down and the trachea was assessed.  The tracheal rings were intact, and there was no evidence of obstruction or stricture.  There were secretions throughout the trachea and main bronchi but no lesions were seen.  Photos were taken.    Next, the bronchoscope and laryngoscope were withdrawn.  The cervical esophagoscope was introduced into the esophageal introitus, and passed easily down the level of the cervical esophagus.  Suction was used to get rid of secretions, and the mucosa was thoroughly assess.  The Melbeta scopes were used to assess the mucosa of the scope was withdrawn, and pictures were taken.  There were no abnormal findings noted on esophagoscopy.  This concluded the procedure, the patient's care was handed back to anesthesia in good condition without any complications.    Ronnie Mcgarry MD     Date: 5/22/2023  Time: 15:15 EDT

## 2023-05-25 NOTE — TELEPHONE ENCOUNTER
Spoke with POC( and daughter) she is aware of PET and f/u with Dr. Haywood. Will tell her mom (patient)

## 2023-05-26 ENCOUNTER — HOSPITAL ENCOUNTER (INPATIENT)
Facility: HOSPITAL | Age: 58
LOS: 5 days | Discharge: LEFT BEFORE DISCHARGE | DRG: 871 | End: 2023-05-31
Attending: EMERGENCY MEDICINE | Admitting: FAMILY MEDICINE
Payer: COMMERCIAL

## 2023-05-26 ENCOUNTER — APPOINTMENT (OUTPATIENT)
Dept: CT IMAGING | Facility: HOSPITAL | Age: 58
DRG: 871 | End: 2023-05-26
Payer: COMMERCIAL

## 2023-05-26 ENCOUNTER — APPOINTMENT (OUTPATIENT)
Dept: GENERAL RADIOLOGY | Facility: HOSPITAL | Age: 58
DRG: 871 | End: 2023-05-26
Payer: COMMERCIAL

## 2023-05-26 ENCOUNTER — APPOINTMENT (OUTPATIENT)
Dept: CARDIOLOGY | Facility: HOSPITAL | Age: 58
DRG: 871 | End: 2023-05-26
Payer: COMMERCIAL

## 2023-05-26 ENCOUNTER — TELEPHONE (OUTPATIENT)
Dept: FAMILY MEDICINE CLINIC | Facility: CLINIC | Age: 58
End: 2023-05-26

## 2023-05-26 DIAGNOSIS — N17.9 ACUTE RENAL FAILURE, UNSPECIFIED ACUTE RENAL FAILURE TYPE: ICD-10-CM

## 2023-05-26 DIAGNOSIS — G93.41 ACUTE METABOLIC ENCEPHALOPATHY: ICD-10-CM

## 2023-05-26 DIAGNOSIS — R77.8 ELEVATED TROPONIN: ICD-10-CM

## 2023-05-26 DIAGNOSIS — E87.20 LACTIC ACIDOSIS: ICD-10-CM

## 2023-05-26 DIAGNOSIS — R13.12 OROPHARYNGEAL DYSPHAGIA: ICD-10-CM

## 2023-05-26 DIAGNOSIS — I21.02 ACUTE MYOCARDIAL INFARCTION INVOLVING LEFT ANTERIOR DESCENDING (LAD) CORONARY ARTERY, UNSPECIFIED MI TYPE: Primary | ICD-10-CM

## 2023-05-26 DIAGNOSIS — R65.21 SEPTIC SHOCK: ICD-10-CM

## 2023-05-26 DIAGNOSIS — A41.9 SEPTIC SHOCK: ICD-10-CM

## 2023-05-26 LAB
ALBUMIN SERPL-MCNC: 3.8 G/DL (ref 3.5–5.2)
ALBUMIN/GLOB SERPL: 1.1 G/DL
ALP SERPL-CCNC: 129 U/L (ref 39–117)
ALT SERPL W P-5'-P-CCNC: 99 U/L (ref 1–33)
AMPHET+METHAMPHET UR QL: NEGATIVE
ANION GAP SERPL CALCULATED.3IONS-SCNC: 15.1 MMOL/L (ref 5–15)
ANION GAP SERPL CALCULATED.3IONS-SCNC: 22 MMOL/L (ref 5–15)
APAP SERPL-MCNC: <5 MCG/ML (ref 0–30)
APTT PPP: 37.3 SECONDS (ref 78–95.9)
ARTERIAL PATENCY WRIST A: POSITIVE
ASCENDING AORTA: 3 CM
AST SERPL-CCNC: 192 U/L (ref 1–32)
BACTERIA UR QL AUTO: ABNORMAL /HPF
BARBITURATES UR QL SCN: NEGATIVE
BASE EXCESS BLDA CALC-SCNC: -3.7 MMOL/L (ref -2–2)
BASOPHILS # BLD AUTO: 0.03 10*3/MM3 (ref 0–0.2)
BASOPHILS NFR BLD AUTO: 0.3 % (ref 0–1.5)
BDY SITE: ABNORMAL
BENZODIAZ UR QL SCN: NEGATIVE
BH CV ECHO MEAS - AO MAX PG: 40 MMHG
BH CV ECHO MEAS - AO MEAN PG: 23.4 MMHG
BH CV ECHO MEAS - AO ROOT DIAM: 2.6 CM
BH CV ECHO MEAS - AO V2 MAX: 316 CM/SEC
BH CV ECHO MEAS - AO V2 VTI: 52.3 CM
BH CV ECHO MEAS - AVA(I,D): 1.08 CM2
BH CV ECHO MEAS - EDV(CUBED): 62 ML
BH CV ECHO MEAS - EDV(MOD-SP2): 92.4 ML
BH CV ECHO MEAS - EDV(MOD-SP4): 54 ML
BH CV ECHO MEAS - EF(MOD-BP): 45.7 %
BH CV ECHO MEAS - EF(MOD-SP2): 50.6 %
BH CV ECHO MEAS - EF(MOD-SP4): 41.3 %
BH CV ECHO MEAS - ESV(CUBED): 24.8 ML
BH CV ECHO MEAS - ESV(MOD-SP2): 45.6 ML
BH CV ECHO MEAS - ESV(MOD-SP4): 31.7 ML
BH CV ECHO MEAS - FS: 26.3 %
BH CV ECHO MEAS - IVS/LVPW: 1 CM
BH CV ECHO MEAS - IVSD: 1.4 CM
BH CV ECHO MEAS - LA DIMENSION: 3.9 CM
BH CV ECHO MEAS - LAT PEAK E' VEL: 6.1 CM/SEC
BH CV ECHO MEAS - LV DIASTOLIC VOL/BSA (35-75): 30.3 CM2
BH CV ECHO MEAS - LV MASS(C)D: 205.7 GRAMS
BH CV ECHO MEAS - LV MAX PG: 5.6 MMHG
BH CV ECHO MEAS - LV MEAN PG: 3.2 MMHG
BH CV ECHO MEAS - LV SYSTOLIC VOL/BSA (12-30): 17.8 CM2
BH CV ECHO MEAS - LV V1 MAX: 118.7 CM/SEC
BH CV ECHO MEAS - LV V1 VTI: 18.3 CM
BH CV ECHO MEAS - LVIDD: 4 CM
BH CV ECHO MEAS - LVIDS: 2.9 CM
BH CV ECHO MEAS - LVOT AREA: 3.1 CM2
BH CV ECHO MEAS - LVOT DIAM: 1.98 CM
BH CV ECHO MEAS - LVPWD: 1.4 CM
BH CV ECHO MEAS - MED PEAK E' VEL: 6.5 CM/SEC
BH CV ECHO MEAS - MR MAX PG: 65.4 MMHG
BH CV ECHO MEAS - MR MAX VEL: 404.5 CM/SEC
BH CV ECHO MEAS - MR MEAN PG: 40 MMHG
BH CV ECHO MEAS - MR MEAN VEL: 298.9 CM/SEC
BH CV ECHO MEAS - MR VTI: 94.7 CM
BH CV ECHO MEAS - MV A MAX VEL: 153.5 CM/SEC
BH CV ECHO MEAS - MV DEC SLOPE: 668.9 CM/SEC2
BH CV ECHO MEAS - MV DEC TIME: 240 MSEC
BH CV ECHO MEAS - MV E MAX VEL: 154 CM/SEC
BH CV ECHO MEAS - MV E/A: 1
BH CV ECHO MEAS - MV MAX PG: 10 MMHG
BH CV ECHO MEAS - MV MEAN PG: 5.2 MMHG
BH CV ECHO MEAS - MV P1/2T: 68.4 MSEC
BH CV ECHO MEAS - MV V2 VTI: 34.4 CM
BH CV ECHO MEAS - MVA(P1/2T): 3.2 CM2
BH CV ECHO MEAS - MVA(VTI): 1.63 CM2
BH CV ECHO MEAS - RAP SYSTOLE: 3 MMHG
BH CV ECHO MEAS - RVDD: 2.7 CM
BH CV ECHO MEAS - RVSP: 44.8 MMHG
BH CV ECHO MEAS - SI(MOD-SP2): 26.2 ML/M2
BH CV ECHO MEAS - SI(MOD-SP4): 12.5 ML/M2
BH CV ECHO MEAS - SV(LVOT): 56.3 ML
BH CV ECHO MEAS - SV(MOD-SP2): 46.8 ML
BH CV ECHO MEAS - SV(MOD-SP4): 22.3 ML
BH CV ECHO MEAS - TR MAX PG: 41.8 MMHG
BH CV ECHO MEAS - TR MAX VEL: 323.4 CM/SEC
BH CV ECHO MEASUREMENTS AVERAGE E/E' RATIO: 24.44
BILIRUB SERPL-MCNC: 1.4 MG/DL (ref 0–1.2)
BILIRUB UR QL STRIP: NEGATIVE
BUN SERPL-MCNC: 25 MG/DL (ref 6–20)
BUN SERPL-MCNC: 30 MG/DL (ref 6–20)
BUN/CREAT SERPL: 12.1 (ref 7–25)
BUN/CREAT SERPL: 22.7 (ref 7–25)
CA-I BLDA-SCNC: 1.11 MMOL/L (ref 1.13–1.32)
CALCIUM SPEC-SCNC: 8 MG/DL (ref 8.6–10.5)
CALCIUM SPEC-SCNC: 9.6 MG/DL (ref 8.6–10.5)
CANNABINOIDS SERPL QL: NEGATIVE
CHLORIDE BLDA-SCNC: 100 MMOL/L (ref 98–106)
CHLORIDE SERPL-SCNC: 103 MMOL/L (ref 98–107)
CHLORIDE SERPL-SCNC: 96 MMOL/L (ref 98–107)
CK MB SERPL-CCNC: 115.1 NG/ML
CLARITY UR: CLEAR
CO2 SERPL-SCNC: 17.9 MMOL/L (ref 22–29)
CO2 SERPL-SCNC: 19 MMOL/L (ref 22–29)
COCAINE UR QL: NEGATIVE
COHGB MFR BLD: 2 % (ref 0–1.5)
COLOR UR: YELLOW
CREAT SERPL-MCNC: 1.32 MG/DL (ref 0.57–1)
CREAT SERPL-MCNC: 2.07 MG/DL (ref 0.57–1)
D-LACTATE SERPL-SCNC: 3.1 MMOL/L (ref 0.5–2)
D-LACTATE SERPL-SCNC: 3.7 MMOL/L (ref 0.5–2)
D-LACTATE SERPL-SCNC: 4.8 MMOL/L (ref 0.5–2)
D-LACTATE SERPL-SCNC: 4.9 MMOL/L (ref 0.5–2)
D-LACTATE SERPL-SCNC: 9.6 MMOL/L (ref 0.5–2)
DEPRECATED RDW RBC AUTO: 44.4 FL (ref 37–54)
EGFRCR SERPLBLD CKD-EPI 2021: 27.5 ML/MIN/1.73
EGFRCR SERPLBLD CKD-EPI 2021: 47.2 ML/MIN/1.73
EOSINOPHIL # BLD AUTO: 0.04 10*3/MM3 (ref 0–0.4)
EOSINOPHIL NFR BLD AUTO: 0.4 % (ref 0.3–6.2)
ERYTHROCYTE [DISTWIDTH] IN BLOOD BY AUTOMATED COUNT: 14.6 % (ref 12.3–15.4)
FENTANYL UR-MCNC: NEGATIVE NG/ML
FHHB: 4.6 % (ref 0–5)
FIBRINOGEN PPP-MCNC: 230 MG/DL (ref 215–521)
GAS FLOW AIRWAY: 2 LPM
GEN 5 2HR TROPONIN T REFLEX: 256 NG/L
GLOBULIN UR ELPH-MCNC: 3.4 GM/DL
GLUCOSE BLDA-MCNC: 181 MG/DL (ref 65–99)
GLUCOSE BLDC GLUCOMTR-MCNC: 118 MG/DL (ref 70–99)
GLUCOSE BLDC GLUCOMTR-MCNC: 138 MG/DL (ref 70–99)
GLUCOSE BLDC GLUCOMTR-MCNC: 152 MG/DL (ref 70–99)
GLUCOSE SERPL-MCNC: 168 MG/DL (ref 65–99)
GLUCOSE SERPL-MCNC: 172 MG/DL (ref 65–99)
GLUCOSE UR STRIP-MCNC: NEGATIVE MG/DL
HAV IGM SERPL QL IA: ABNORMAL
HBV CORE IGM SERPL QL IA: ABNORMAL
HBV SURFACE AG SERPL QL IA: ABNORMAL
HCO3 BLDA-SCNC: 19.3 MMOL/L (ref 22–26)
HCT VFR BLD AUTO: 46.4 % (ref 34–46.6)
HCV AB SER DONR QL: REACTIVE
HGB BLD-MCNC: 15.1 G/DL (ref 12–15.9)
HGB BLDA-MCNC: 15.2 G/DL (ref 11.7–14.6)
HGB UR QL STRIP.AUTO: ABNORMAL
HOLD SPECIMEN: NORMAL
HOLD SPECIMEN: NORMAL
HYALINE CASTS UR QL AUTO: ABNORMAL /LPF
IMM GRANULOCYTES # BLD AUTO: 0.11 10*3/MM3 (ref 0–0.05)
IMM GRANULOCYTES NFR BLD AUTO: 1.1 % (ref 0–0.5)
INHALED O2 CONCENTRATION: 28 %
INR PPP: 1.4 (ref 0.86–1.15)
KETONES UR QL STRIP: NEGATIVE
L PNEUMO1 AG UR QL IA: NEGATIVE
LACTATE BLDA-SCNC: 6.56 MMOL/L (ref 0.5–2)
LEFT ATRIUM VOLUME INDEX: 37.5 ML/M2
LEUKOCYTE ESTERASE UR QL STRIP.AUTO: NEGATIVE
LIPASE SERPL-CCNC: 8 U/L (ref 13–60)
LYMPHOCYTES # BLD AUTO: 0.67 10*3/MM3 (ref 0.7–3.1)
LYMPHOCYTES NFR BLD AUTO: 6.5 % (ref 19.6–45.3)
MAGNESIUM SERPL-MCNC: 1.4 MG/DL (ref 1.6–2.6)
MAXIMAL PREDICTED HEART RATE: 163 BPM
MCH RBC QN AUTO: 27.7 PG (ref 26.6–33)
MCHC RBC AUTO-ENTMCNC: 32.5 G/DL (ref 31.5–35.7)
MCV RBC AUTO: 85 FL (ref 79–97)
METHADONE UR QL SCN: NEGATIVE
METHGB BLD QL: 0.2 % (ref 0–1.5)
MODALITY: ABNORMAL
MONOCYTES # BLD AUTO: 0.1 10*3/MM3 (ref 0.1–0.9)
MONOCYTES NFR BLD AUTO: 1 % (ref 5–12)
MRSA DNA SPEC QL NAA+PROBE: NORMAL
NEUTROPHILS NFR BLD AUTO: 9.34 10*3/MM3 (ref 1.7–7)
NEUTROPHILS NFR BLD AUTO: 90.7 % (ref 42.7–76)
NITRITE UR QL STRIP: NEGATIVE
NOTE: ABNORMAL
NRBC BLD AUTO-RTO: 0.4 /100 WBC (ref 0–0.2)
OPIATES UR QL: POSITIVE
OXYCODONE UR QL SCN: NEGATIVE
OXYHGB MFR BLDV: 93.2 % (ref 94–99)
PCO2 BLDA: 29.8 MM HG (ref 35–45)
PH BLDA: 7.43 PH UNITS (ref 7.35–7.45)
PH UR STRIP.AUTO: 6.5 [PH] (ref 5–8)
PLATELET # BLD AUTO: 95 10*3/MM3 (ref 140–450)
PMV BLD AUTO: 10.9 FL (ref 6–12)
PO2 BLD: 279 MM[HG] (ref 0–500)
PO2 BLDA: 78 MM HG (ref 80–100)
POTASSIUM BLDA-SCNC: 3.22 MMOL/L (ref 3.5–5)
POTASSIUM SERPL-SCNC: 3.4 MMOL/L (ref 3.5–5.2)
POTASSIUM SERPL-SCNC: 3.5 MMOL/L (ref 3.5–5.2)
PROCALCITONIN SERPL-MCNC: >400 NG/ML (ref 0–0.25)
PROT SERPL-MCNC: 7.2 G/DL (ref 6–8.5)
PROT UR QL STRIP: ABNORMAL
PROTHROMBIN TIME: 17.1 SECONDS (ref 11.8–14.9)
QT INTERVAL: 365 MS
QT INTERVAL: 373 MS
QT INTERVAL: 390 MS
RBC # BLD AUTO: 5.46 10*6/MM3 (ref 3.77–5.28)
RBC # UR STRIP: ABNORMAL /HPF
REF LAB TEST METHOD: ABNORMAL
S PNEUM AG SPEC QL LA: NEGATIVE
SALICYLATES SERPL-MCNC: <0.3 MG/DL
SAO2 % BLDCOA: 95.3 % (ref 95–99)
SODIUM BLDA-SCNC: 134.6 MMOL/L (ref 136–146)
SODIUM SERPL-SCNC: 136 MMOL/L (ref 136–145)
SODIUM SERPL-SCNC: 137 MMOL/L (ref 136–145)
SP GR UR STRIP: 1.01 (ref 1–1.03)
SQUAMOUS #/AREA URNS HPF: ABNORMAL /HPF
STRESS TARGET HR: 139 BPM
T-UPTAKE NFR SERPL: 0.94 TBI (ref 0.8–1.3)
T4 SERPL-MCNC: 8.39 MCG/DL (ref 4.5–11.7)
TROPONIN T DELTA: -47 NG/L
TROPONIN T SERPL HS-MCNC: 303 NG/L
TSH SERPL DL<=0.05 MIU/L-ACNC: 1.1 UIU/ML (ref 0.27–4.2)
UROBILINOGEN UR QL STRIP: ABNORMAL
VANCOMYCIN SERPL-MCNC: <4 MCG/ML (ref 5–40)
WBC # UR STRIP: ABNORMAL /HPF
WBC NRBC COR # BLD: 10.29 10*3/MM3 (ref 3.4–10.8)
WHOLE BLOOD HOLD COAG: NORMAL
WHOLE BLOOD HOLD SPECIMEN: NORMAL

## 2023-05-26 PROCEDURE — 63710000001 INSULIN REGULAR HUMAN PER 5 UNITS: Performed by: FAMILY MEDICINE

## 2023-05-26 PROCEDURE — 82805 BLOOD GASES W/O2 SATURATION: CPT | Performed by: EMERGENCY MEDICINE

## 2023-05-26 PROCEDURE — 93005 ELECTROCARDIOGRAM TRACING: CPT | Performed by: EMERGENCY MEDICINE

## 2023-05-26 PROCEDURE — 85730 THROMBOPLASTIN TIME PARTIAL: CPT | Performed by: FAMILY MEDICINE

## 2023-05-26 PROCEDURE — 25010000002 CEFEPIME PER 500 MG: Performed by: EMERGENCY MEDICINE

## 2023-05-26 PROCEDURE — 80307 DRUG TEST PRSMV CHEM ANLYZR: CPT | Performed by: NURSE PRACTITIONER

## 2023-05-26 PROCEDURE — 25010000002 EPINEPHRINE 1 MG/ML SOLUTION 30 ML VIAL: Performed by: FAMILY MEDICINE

## 2023-05-26 PROCEDURE — 71045 X-RAY EXAM CHEST 1 VIEW: CPT

## 2023-05-26 PROCEDURE — 93306 TTE W/DOPPLER COMPLETE: CPT

## 2023-05-26 PROCEDURE — 83050 HGB METHEMOGLOBIN QUAN: CPT | Performed by: EMERGENCY MEDICINE

## 2023-05-26 PROCEDURE — 63710000001 INSULIN DETEMIR PER 5 UNITS: Performed by: NURSE PRACTITIONER

## 2023-05-26 PROCEDURE — 80053 COMPREHEN METABOLIC PANEL: CPT | Performed by: EMERGENCY MEDICINE

## 2023-05-26 PROCEDURE — 82375 ASSAY CARBOXYHB QUANT: CPT | Performed by: EMERGENCY MEDICINE

## 2023-05-26 PROCEDURE — 87070 CULTURE OTHR SPECIMN AEROBIC: CPT | Performed by: EMERGENCY MEDICINE

## 2023-05-26 PROCEDURE — 85025 COMPLETE CBC W/AUTO DIFF WBC: CPT | Performed by: EMERGENCY MEDICINE

## 2023-05-26 PROCEDURE — 80179 DRUG ASSAY SALICYLATE: CPT | Performed by: FAMILY MEDICINE

## 2023-05-26 PROCEDURE — 36415 COLL VENOUS BLD VENIPUNCTURE: CPT

## 2023-05-26 PROCEDURE — 25010000002 VANCOMYCIN 5 G RECONSTITUTED SOLUTION: Performed by: FAMILY MEDICINE

## 2023-05-26 PROCEDURE — 83735 ASSAY OF MAGNESIUM: CPT | Performed by: FAMILY MEDICINE

## 2023-05-26 PROCEDURE — 87040 BLOOD CULTURE FOR BACTERIA: CPT | Performed by: EMERGENCY MEDICINE

## 2023-05-26 PROCEDURE — 0 POTASSIUM CHLORIDE 10 MEQ/100ML SOLUTION: Performed by: FAMILY MEDICINE

## 2023-05-26 PROCEDURE — 87899 AGENT NOS ASSAY W/OPTIC: CPT | Performed by: NURSE PRACTITIONER

## 2023-05-26 PROCEDURE — 82948 REAGENT STRIP/BLOOD GLUCOSE: CPT

## 2023-05-26 PROCEDURE — 82553 CREATINE MB FRACTION: CPT | Performed by: SPECIALIST

## 2023-05-26 PROCEDURE — 81001 URINALYSIS AUTO W/SCOPE: CPT | Performed by: EMERGENCY MEDICINE

## 2023-05-26 PROCEDURE — 3E03329 INTRODUCTION OF OTHER ANTI-INFECTIVE INTO PERIPHERAL VEIN, PERCUTANEOUS APPROACH: ICD-10-PCS | Performed by: INTERNAL MEDICINE

## 2023-05-26 PROCEDURE — 84443 ASSAY THYROID STIM HORMONE: CPT | Performed by: FAMILY MEDICINE

## 2023-05-26 PROCEDURE — 99292 CRITICAL CARE ADDL 30 MIN: CPT | Performed by: INTERNAL MEDICINE

## 2023-05-26 PROCEDURE — 36600 WITHDRAWAL OF ARTERIAL BLOOD: CPT | Performed by: EMERGENCY MEDICINE

## 2023-05-26 PROCEDURE — P9612 CATHETERIZE FOR URINE SPEC: HCPCS

## 2023-05-26 PROCEDURE — 93005 ELECTROCARDIOGRAM TRACING: CPT | Performed by: FAMILY MEDICINE

## 2023-05-26 PROCEDURE — 25010000002 METHYLPREDNISOLONE PER 125 MG: Performed by: EMERGENCY MEDICINE

## 2023-05-26 PROCEDURE — 85610 PROTHROMBIN TIME: CPT | Performed by: EMERGENCY MEDICINE

## 2023-05-26 PROCEDURE — 84479 ASSAY OF THYROID (T3 OR T4): CPT | Performed by: FAMILY MEDICINE

## 2023-05-26 PROCEDURE — 87205 SMEAR GRAM STAIN: CPT | Performed by: EMERGENCY MEDICINE

## 2023-05-26 PROCEDURE — 80202 ASSAY OF VANCOMYCIN: CPT | Performed by: FAMILY MEDICINE

## 2023-05-26 PROCEDURE — 84145 PROCALCITONIN (PCT): CPT | Performed by: EMERGENCY MEDICINE

## 2023-05-26 PROCEDURE — 99285 EMERGENCY DEPT VISIT HI MDM: CPT

## 2023-05-26 PROCEDURE — 80074 ACUTE HEPATITIS PANEL: CPT | Performed by: FAMILY MEDICINE

## 2023-05-26 PROCEDURE — 25010000002 VANCOMYCIN 5 G RECONSTITUTED SOLUTION: Performed by: EMERGENCY MEDICINE

## 2023-05-26 PROCEDURE — 25010000002 LORAZEPAM PER 2 MG: Performed by: FAMILY MEDICINE

## 2023-05-26 PROCEDURE — 83605 ASSAY OF LACTIC ACID: CPT | Performed by: EMERGENCY MEDICINE

## 2023-05-26 PROCEDURE — 87641 MR-STAPH DNA AMP PROBE: CPT | Performed by: INTERNAL MEDICINE

## 2023-05-26 PROCEDURE — 84436 ASSAY OF TOTAL THYROXINE: CPT | Performed by: FAMILY MEDICINE

## 2023-05-26 PROCEDURE — 87449 NOS EACH ORGANISM AG IA: CPT | Performed by: NURSE PRACTITIONER

## 2023-05-26 PROCEDURE — 25010000002 CEFEPIME PER 500 MG: Performed by: FAMILY MEDICINE

## 2023-05-26 PROCEDURE — 83690 ASSAY OF LIPASE: CPT | Performed by: NURSE PRACTITIONER

## 2023-05-26 PROCEDURE — 0 MAGNESIUM SULFATE 4 GM/100ML SOLUTION: Performed by: NURSE PRACTITIONER

## 2023-05-26 PROCEDURE — 85384 FIBRINOGEN ACTIVITY: CPT | Performed by: FAMILY MEDICINE

## 2023-05-26 PROCEDURE — 71250 CT THORAX DX C-: CPT

## 2023-05-26 PROCEDURE — 87147 CULTURE TYPE IMMUNOLOGIC: CPT | Performed by: EMERGENCY MEDICINE

## 2023-05-26 PROCEDURE — 70450 CT HEAD/BRAIN W/O DYE: CPT

## 2023-05-26 PROCEDURE — 99291 CRITICAL CARE FIRST HOUR: CPT | Performed by: INTERNAL MEDICINE

## 2023-05-26 PROCEDURE — 74176 CT ABD & PELVIS W/O CONTRAST: CPT

## 2023-05-26 PROCEDURE — 87186 SC STD MICRODIL/AGAR DIL: CPT | Performed by: EMERGENCY MEDICINE

## 2023-05-26 PROCEDURE — 80143 DRUG ASSAY ACETAMINOPHEN: CPT | Performed by: FAMILY MEDICINE

## 2023-05-26 PROCEDURE — 84484 ASSAY OF TROPONIN QUANT: CPT | Performed by: EMERGENCY MEDICINE

## 2023-05-26 PROCEDURE — 99223 1ST HOSP IP/OBS HIGH 75: CPT | Performed by: FAMILY MEDICINE

## 2023-05-26 PROCEDURE — 3E013GC INTRODUCTION OF OTHER THERAPEUTIC SUBSTANCE INTO SUBCUTANEOUS TISSUE, PERCUTANEOUS APPROACH: ICD-10-PCS | Performed by: INTERNAL MEDICINE

## 2023-05-26 RX ORDER — POLYETHYLENE GLYCOL 3350 17 G/17G
17 POWDER, FOR SOLUTION ORAL DAILY PRN
Status: DISCONTINUED | OUTPATIENT
Start: 2023-05-26 | End: 2023-05-26

## 2023-05-26 RX ORDER — GINSENG 100 MG
1 CAPSULE ORAL 2 TIMES DAILY
COMMUNITY
Start: 2023-05-11

## 2023-05-26 RX ORDER — CETIRIZINE HYDROCHLORIDE 10 MG/1
1 TABLET ORAL DAILY
COMMUNITY
Start: 2023-05-10

## 2023-05-26 RX ORDER — NITROGLYCERIN 0.4 MG/1
0.4 TABLET SUBLINGUAL
Status: DISCONTINUED | OUTPATIENT
Start: 2023-05-26 | End: 2023-05-26 | Stop reason: SDUPTHER

## 2023-05-26 RX ORDER — BISACODYL 5 MG/1
5 TABLET, DELAYED RELEASE ORAL DAILY PRN
Status: DISCONTINUED | OUTPATIENT
Start: 2023-05-26 | End: 2023-05-26 | Stop reason: SDUPTHER

## 2023-05-26 RX ORDER — CLOPIDOGREL BISULFATE 75 MG/1
75 TABLET ORAL DAILY
Status: DISCONTINUED | OUTPATIENT
Start: 2023-05-26 | End: 2023-05-26

## 2023-05-26 RX ORDER — LORAZEPAM 2 MG/ML
0.5 INJECTION INTRAMUSCULAR ONCE
Status: COMPLETED | OUTPATIENT
Start: 2023-05-27 | End: 2023-05-26

## 2023-05-26 RX ORDER — SODIUM CHLORIDE 0.9 % (FLUSH) 0.9 %
10 SYRINGE (ML) INJECTION AS NEEDED
Status: DISCONTINUED | OUTPATIENT
Start: 2023-05-26 | End: 2023-05-26

## 2023-05-26 RX ORDER — CEFEPIME 1 G/50ML
2 INJECTION, SOLUTION INTRAVENOUS ONCE
Status: COMPLETED | OUTPATIENT
Start: 2023-05-26 | End: 2023-05-26

## 2023-05-26 RX ORDER — SODIUM CHLORIDE 0.9 % (FLUSH) 0.9 %
10 SYRINGE (ML) INJECTION EVERY 12 HOURS SCHEDULED
Status: DISCONTINUED | OUTPATIENT
Start: 2023-05-26 | End: 2023-05-31 | Stop reason: HOSPADM

## 2023-05-26 RX ORDER — BISACODYL 10 MG
10 SUPPOSITORY, RECTAL RECTAL DAILY PRN
Status: DISCONTINUED | OUTPATIENT
Start: 2023-05-26 | End: 2023-05-26

## 2023-05-26 RX ORDER — CLOPIDOGREL BISULFATE 75 MG/1
1 TABLET ORAL DAILY
COMMUNITY
Start: 2023-02-27

## 2023-05-26 RX ORDER — ASPIRIN 300 MG/1
300 SUPPOSITORY RECTAL DAILY
Status: DISCONTINUED | OUTPATIENT
Start: 2023-05-26 | End: 2023-05-26

## 2023-05-26 RX ORDER — POLYETHYLENE GLYCOL 3350 17 G/17G
17 POWDER, FOR SOLUTION ORAL DAILY PRN
Status: DISCONTINUED | OUTPATIENT
Start: 2023-05-26 | End: 2023-05-26 | Stop reason: SDUPTHER

## 2023-05-26 RX ORDER — LORAZEPAM 0.5 MG/1
0.5 TABLET ORAL DAILY PRN
COMMUNITY
Start: 2023-05-25

## 2023-05-26 RX ORDER — AMOXICILLIN 250 MG
2 CAPSULE ORAL 2 TIMES DAILY
Status: DISCONTINUED | OUTPATIENT
Start: 2023-05-26 | End: 2023-05-29

## 2023-05-26 RX ORDER — INSULIN LISPRO 100 [IU]/ML
1-10 INJECTION, SOLUTION INTRAVENOUS; SUBCUTANEOUS
COMMUNITY
Start: 2023-05-05

## 2023-05-26 RX ORDER — ACETAMINOPHEN 325 MG/1
650 TABLET ORAL EVERY 6 HOURS PRN
Status: DISCONTINUED | OUTPATIENT
Start: 2023-05-26 | End: 2023-05-29

## 2023-05-26 RX ORDER — SODIUM CHLORIDE, SODIUM LACTATE, POTASSIUM CHLORIDE, CALCIUM CHLORIDE 600; 310; 30; 20 MG/100ML; MG/100ML; MG/100ML; MG/100ML
125 INJECTION, SOLUTION INTRAVENOUS CONTINUOUS
Status: DISCONTINUED | OUTPATIENT
Start: 2023-05-26 | End: 2023-05-27

## 2023-05-26 RX ORDER — NOREPINEPHRINE BITARTRATE 0.03 MG/ML
.02-.5 INJECTION, SOLUTION INTRAVENOUS
Status: DISCONTINUED | OUTPATIENT
Start: 2023-05-26 | End: 2023-05-29

## 2023-05-26 RX ORDER — AMOXICILLIN 250 MG
2 CAPSULE ORAL 2 TIMES DAILY
Status: DISCONTINUED | OUTPATIENT
Start: 2023-05-26 | End: 2023-05-26

## 2023-05-26 RX ORDER — NICOTINE POLACRILEX 4 MG
15 LOZENGE BUCCAL
Status: DISCONTINUED | OUTPATIENT
Start: 2023-05-26 | End: 2023-05-31 | Stop reason: HOSPADM

## 2023-05-26 RX ORDER — NOREPINEPHRINE BITARTRATE 0.03 MG/ML
.02-.3 INJECTION, SOLUTION INTRAVENOUS
Status: DISCONTINUED | OUTPATIENT
Start: 2023-05-26 | End: 2023-05-26 | Stop reason: SDUPTHER

## 2023-05-26 RX ORDER — LOPERAMIDE HYDROCHLORIDE 2 MG/1
2 CAPSULE ORAL 4 TIMES DAILY PRN
COMMUNITY
Start: 2023-05-13

## 2023-05-26 RX ORDER — CEFEPIME 1 G/50ML
2 INJECTION, SOLUTION INTRAVENOUS EVERY 24 HOURS
Status: DISCONTINUED | OUTPATIENT
Start: 2023-05-27 | End: 2023-05-26

## 2023-05-26 RX ORDER — SODIUM CHLORIDE 9 MG/ML
40 INJECTION, SOLUTION INTRAVENOUS AS NEEDED
Status: DISCONTINUED | OUTPATIENT
Start: 2023-05-26 | End: 2023-05-31 | Stop reason: HOSPADM

## 2023-05-26 RX ORDER — POTASSIUM CHLORIDE 7.45 MG/ML
10 INJECTION INTRAVENOUS
Status: COMPLETED | OUTPATIENT
Start: 2023-05-26 | End: 2023-05-26

## 2023-05-26 RX ORDER — BISACODYL 10 MG
10 SUPPOSITORY, RECTAL RECTAL DAILY PRN
Status: DISCONTINUED | OUTPATIENT
Start: 2023-05-26 | End: 2023-05-29

## 2023-05-26 RX ORDER — PANTOPRAZOLE SODIUM 40 MG/1
40 TABLET, DELAYED RELEASE ORAL DAILY
COMMUNITY

## 2023-05-26 RX ORDER — GABAPENTIN 300 MG/1
300 CAPSULE ORAL 4 TIMES DAILY
COMMUNITY
Start: 2023-04-27

## 2023-05-26 RX ORDER — CEFEPIME 1 G/50ML
2 INJECTION, SOLUTION INTRAVENOUS EVERY 12 HOURS
Status: DISCONTINUED | OUTPATIENT
Start: 2023-05-26 | End: 2023-05-27

## 2023-05-26 RX ORDER — NOREPINEPHRINE BITARTRATE 0.03 MG/ML
.02-.3 INJECTION, SOLUTION INTRAVENOUS
Status: DISCONTINUED | OUTPATIENT
Start: 2023-05-26 | End: 2023-05-26

## 2023-05-26 RX ORDER — ATORVASTATIN CALCIUM 40 MG/1
80 TABLET, FILM COATED ORAL NIGHTLY
Status: DISCONTINUED | OUTPATIENT
Start: 2023-05-26 | End: 2023-05-29

## 2023-05-26 RX ORDER — CEFEPIME 1 G/50ML
2 INJECTION, SOLUTION INTRAVENOUS ONCE
Status: DISCONTINUED | OUTPATIENT
Start: 2023-05-26 | End: 2023-05-26 | Stop reason: SDUPTHER

## 2023-05-26 RX ORDER — INSULIN DETEMIR 100 [IU]/ML
10 INJECTION, SOLUTION SUBCUTANEOUS DAILY
COMMUNITY
Start: 2023-05-04

## 2023-05-26 RX ORDER — FUROSEMIDE 40 MG/1
1 TABLET ORAL DAILY
COMMUNITY
Start: 2023-03-28

## 2023-05-26 RX ORDER — HEPARIN SOD,PORCINE/0.9 % NACL 25000/250
12 INTRAVENOUS SOLUTION INTRAVENOUS
Status: DISCONTINUED | OUTPATIENT
Start: 2023-05-26 | End: 2023-05-26

## 2023-05-26 RX ORDER — PSEUDOEPHED/ACETAMINOPH/DIPHEN 30MG-500MG
TABLET ORAL EVERY 6 HOURS PRN
COMMUNITY
Start: 2023-05-22

## 2023-05-26 RX ORDER — BISACODYL 5 MG/1
5 TABLET, DELAYED RELEASE ORAL DAILY PRN
Status: DISCONTINUED | OUTPATIENT
Start: 2023-05-26 | End: 2023-05-26

## 2023-05-26 RX ORDER — METHYLPREDNISOLONE SODIUM SUCCINATE 125 MG/2ML
125 INJECTION, POWDER, LYOPHILIZED, FOR SOLUTION INTRAMUSCULAR; INTRAVENOUS ONCE
Status: COMPLETED | OUTPATIENT
Start: 2023-05-26 | End: 2023-05-26

## 2023-05-26 RX ORDER — BISACODYL 10 MG
10 SUPPOSITORY, RECTAL RECTAL DAILY PRN
Status: DISCONTINUED | OUTPATIENT
Start: 2023-05-26 | End: 2023-05-26 | Stop reason: SDUPTHER

## 2023-05-26 RX ORDER — AMOXICILLIN 250 MG
2 CAPSULE ORAL 2 TIMES DAILY
Status: DISCONTINUED | OUTPATIENT
Start: 2023-05-26 | End: 2023-05-26 | Stop reason: SDUPTHER

## 2023-05-26 RX ORDER — ALBUTEROL SULFATE 90 UG/1
2 AEROSOL, METERED RESPIRATORY (INHALATION) EVERY 4 HOURS PRN
COMMUNITY
Start: 2023-05-15

## 2023-05-26 RX ORDER — DEXTROSE MONOHYDRATE 25 G/50ML
25 INJECTION, SOLUTION INTRAVENOUS
Status: DISCONTINUED | OUTPATIENT
Start: 2023-05-26 | End: 2023-05-31 | Stop reason: HOSPADM

## 2023-05-26 RX ORDER — ACETAMINOPHEN 325 MG/1
650 TABLET ORAL EVERY 6 HOURS PRN
Status: DISCONTINUED | OUTPATIENT
Start: 2023-05-26 | End: 2023-05-26

## 2023-05-26 RX ORDER — NITROGLYCERIN 0.4 MG/1
0.4 TABLET SUBLINGUAL
Status: DISCONTINUED | OUTPATIENT
Start: 2023-05-26 | End: 2023-05-31

## 2023-05-26 RX ORDER — MAGNESIUM SULFATE HEPTAHYDRATE 40 MG/ML
4 INJECTION, SOLUTION INTRAVENOUS ONCE
Status: COMPLETED | OUTPATIENT
Start: 2023-05-26 | End: 2023-05-26

## 2023-05-26 RX ORDER — CLOPIDOGREL BISULFATE 75 MG/1
75 TABLET ORAL DAILY
Status: DISCONTINUED | OUTPATIENT
Start: 2023-05-27 | End: 2023-05-29

## 2023-05-26 RX ORDER — POLYETHYLENE GLYCOL 3350 17 G/17G
17 POWDER, FOR SOLUTION ORAL DAILY PRN
Status: DISCONTINUED | OUTPATIENT
Start: 2023-05-26 | End: 2023-05-29

## 2023-05-26 RX ORDER — ATORVASTATIN CALCIUM 40 MG/1
80 TABLET, FILM COATED ORAL NIGHTLY
Status: DISCONTINUED | OUTPATIENT
Start: 2023-05-26 | End: 2023-05-26

## 2023-05-26 RX ORDER — SODIUM CHLORIDE 0.9 % (FLUSH) 0.9 %
10 SYRINGE (ML) INJECTION AS NEEDED
Status: DISCONTINUED | OUTPATIENT
Start: 2023-05-26 | End: 2023-05-31 | Stop reason: HOSPADM

## 2023-05-26 RX ADMIN — VANCOMYCIN HYDROCHLORIDE 1500 MG: 5 INJECTION, POWDER, LYOPHILIZED, FOR SOLUTION INTRAVENOUS at 04:12

## 2023-05-26 RX ADMIN — Medication 0.18 MCG/KG/MIN: at 16:56

## 2023-05-26 RX ADMIN — CEFEPIME 2 G: 1 INJECTION, SOLUTION INTRAVENOUS at 02:25

## 2023-05-26 RX ADMIN — Medication 0.5 MCG/KG/MIN: at 10:10

## 2023-05-26 RX ADMIN — VASOPRESSIN 0.04 UNITS/MIN: 0.2 INJECTION INTRAVENOUS at 09:00

## 2023-05-26 RX ADMIN — Medication 0.5 MCG/KG/MIN: at 03:16

## 2023-05-26 RX ADMIN — POTASSIUM CHLORIDE 10 MEQ: 7.46 INJECTION, SOLUTION INTRAVENOUS at 08:59

## 2023-05-26 RX ADMIN — Medication 10 ML: at 21:52

## 2023-05-26 RX ADMIN — SODIUM CHLORIDE, POTASSIUM CHLORIDE, SODIUM LACTATE AND CALCIUM CHLORIDE 125 ML/HR: 600; 310; 30; 20 INJECTION, SOLUTION INTRAVENOUS at 12:31

## 2023-05-26 RX ADMIN — SODIUM CHLORIDE 1000 ML: 9 INJECTION, SOLUTION INTRAVENOUS at 01:45

## 2023-05-26 RX ADMIN — METHYLPREDNISOLONE SODIUM SUCCINATE 125 MG: 125 INJECTION INTRAMUSCULAR; INTRAVENOUS at 04:43

## 2023-05-26 RX ADMIN — VANCOMYCIN HYDROCHLORIDE 1250 MG: 5 INJECTION, POWDER, LYOPHILIZED, FOR SOLUTION INTRAVENOUS at 13:39

## 2023-05-26 RX ADMIN — NOREPINEPHRINE BITARTRATE 0.02 MCG/KG/MIN: 0.03 INJECTION, SOLUTION INTRAVENOUS at 02:25

## 2023-05-26 RX ADMIN — INSULIN HUMAN 2 UNITS: 100 INJECTION, SOLUTION PARENTERAL at 08:59

## 2023-05-26 RX ADMIN — CLOPIDOGREL BISULFATE 75 MG: 75 TABLET ORAL at 13:05

## 2023-05-26 RX ADMIN — ATORVASTATIN CALCIUM 80 MG: 40 TABLET, FILM COATED ORAL at 21:52

## 2023-05-26 RX ADMIN — Medication 10 ML: at 09:01

## 2023-05-26 RX ADMIN — EPINEPHRINE 0.05 MCG/KG/MIN: 1 INJECTION INTRAMUSCULAR; INTRAVENOUS; SUBCUTANEOUS at 06:49

## 2023-05-26 RX ADMIN — CEFEPIME 2 G: 1 INJECTION, SOLUTION INTRAVENOUS at 16:52

## 2023-05-26 RX ADMIN — Medication 0.5 MCG/KG/MIN: at 06:32

## 2023-05-26 RX ADMIN — INSULIN DETEMIR 15 UNITS: 100 INJECTION, SOLUTION SUBCUTANEOUS at 21:51

## 2023-05-26 RX ADMIN — ACETAMINOPHEN 650 MG: 325 TABLET ORAL at 13:37

## 2023-05-26 RX ADMIN — SODIUM CHLORIDE 1000 ML: 9 INJECTION, SOLUTION INTRAVENOUS at 01:40

## 2023-05-26 RX ADMIN — VASOPRESSIN 0.04 UNITS/MIN: 0.2 INJECTION INTRAVENOUS at 17:52

## 2023-05-26 RX ADMIN — LORAZEPAM 0.5 MG: 2 INJECTION INTRAMUSCULAR; INTRAVENOUS at 23:15

## 2023-05-26 RX ADMIN — POTASSIUM CHLORIDE 10 MEQ: 7.46 INJECTION, SOLUTION INTRAVENOUS at 10:15

## 2023-05-26 RX ADMIN — POTASSIUM CHLORIDE 10 MEQ: 7.46 INJECTION, SOLUTION INTRAVENOUS at 09:15

## 2023-05-26 RX ADMIN — SODIUM CHLORIDE, POTASSIUM CHLORIDE, SODIUM LACTATE AND CALCIUM CHLORIDE 125 ML/HR: 600; 310; 30; 20 INJECTION, SOLUTION INTRAVENOUS at 23:16

## 2023-05-26 RX ADMIN — POTASSIUM CHLORIDE 10 MEQ: 7.46 INJECTION, SOLUTION INTRAVENOUS at 10:12

## 2023-05-26 RX ADMIN — MAGNESIUM SULFATE HEPTAHYDRATE 4 G: 4 INJECTION, SOLUTION INTRAVENOUS at 08:58

## 2023-05-26 RX ADMIN — SODIUM CHLORIDE, POTASSIUM CHLORIDE, SODIUM LACTATE AND CALCIUM CHLORIDE 3000 ML: 600; 310; 30; 20 INJECTION, SOLUTION INTRAVENOUS at 08:57

## 2023-05-26 NOTE — ED PROVIDER NOTES
"Time: 1:24 AM EDT  Date of encounter:  5/26/2023  Independent Historian/Clinical History and Information was obtained by:   Family and EMS  Chief Complaint: Diabetic issues    History is limited by: Altered Mental Status    History of Present Illness:  Patient is a 57 y.o. year old female who presents to the emergency department for evaluation of diabetic issues.    Patient could be having diabetic issues. Daughter found her on the floor unconscious, and she had soiled herself. EMS gave her a medication, and she woke up, told them she didn't need it as she was not overdosing.   We received the PE from EMS.     HPI    Patient Care Team  Primary Care Provider: Vidal Luevano APRN    Past Medical History:     Allergies   Allergen Reactions   • Amoxicillin Shortness Of Breath   • Penicillins Shortness Of Breath     No past medical history on file.  No past surgical history on file.  No family history on file.    Home Medications:  Prior to Admission medications    Not on File        Social History:          Review of Systems:  Review of Systems   Unable to perform ROS: Mental status change        Physical Exam:  BP 92/48   Pulse 108   Temp 98.8 °F (37.1 °C) (Oral)   Resp 22   Ht 162.6 cm (64\")   Wt 73.4 kg (161 lb 13.1 oz)   SpO2 (!) 89%   BMI 27.78 kg/m²     Physical Exam  Eyes:      Extraocular Movements:      Right eye: Normal extraocular motion.      Left eye: Normal extraocular motion.   Pulmonary:      Comments: Snoring respirating  Musculoskeletal:      Right lower leg: Edema (pedal) present.      Left lower leg: Edema (pedal) present.   Neurological:      Comments: Solemnent, awakens to painful stimuli                  Procedures:  Procedures      Medical Decision Making:      Comorbidities that affect care:    Diabetes    External Notes reviewed:    Hospital Discharge Summary: Admission for sepsis      The following orders were placed and all results were independently analyzed by me:  Orders Placed " This Encounter   Procedures   • Blood Culture - Blood,   • Blood Culture - Blood,   • Wound Culture - Swab, Leg, Left   • MRSA Screen, PCR (Inpatient) - Swab, Nares   • XR Chest 1 View   • CT Head Without Contrast   • [place] Draw   • Comprehensive Metabolic Panel   • Protime-INR   • Procalcitonin   • High Sensitivity Troponin T   • Lactic Acid, Plasma   • CBC Auto Differential   • ABG with Co-Ox and Electrolytes   • High Sensitivity Troponin T 2Hr   • STAT Lactic Acid, Reflex   • Urinalysis With Culture If Indicated - Straight Cath   • Urinalysis, Microscopic Only - Urine, Clean Catch   • aPTT   • aPTT   • aPTT   • Fibrinogen   • Magnesium   • Basic Metabolic Panel   • Salicylate Level   • Acetaminophen Level   • Hepatitis Panel, Acute   • STAT Lactic Acid, Reflex   • Comprehensive Metabolic Panel   • Magnesium   • Phosphorus   • Lactic Acid, Plasma   • Vancomycin, Random   • STAT Lactic Acid, Reflex   • NPO Diet NPO Type: Strict NPO   • Undress & Gown   • Measure Blood Pressure   • Vital Signs   • Straight cath   • Vital Signs   • Intake & Output   • Weigh Patient   • Oral Care   • Vital Signs Every 15 Minutes Until Stable, Then Every 4 Hours   • Telemetry - Place Orders & Notify Provider of Results When Patient Experiences Acute Chest Pain, Dysrhythmia or Respiratory Distress   • Notify Physician For Unrelieved Chest Pain   • RN To Release aPTT Order 6 Hours After Heparin Bolus & 6 Hours After Any Heparin Rate Change   • After 2 Consecutive Therapeutic aPTTs, Obtain aPTT Daily.  If a Rate Adjustment is Necessary, Resume Every 6 Hour aPTT Draws   • Telemetry - Maintain IV Access   • Telemetry - Place Orders & Notify Provider of Results When Patient Experiences Acute Chest Pain, Dysrhythmia or Respiratory Distress   • Stop Infusion & Notify Provider if Bleeding Occurs   • Notify Provider if PTT Not in Therapeutic Range After 24 Hours   • Notify Provider Platelet Count Less Than 84671   • Continuous Pulse Oximetry    • Code Status and Medical Interventions:   • Hospitalist (on-call MD unless specified)   • Cardiac Rehab Evaluation and Enrollment   • Inpatient Cardiology Consult   • Inpatient Pulmonology Consult   • Oxygen Therapy- Nasal Cannula; Titrate 1-6 LPM Per SpO2; 90 - 95%   • NIPPV - Provider Settings   • POC Glucose Q6H   • POC Glucose Once   • ECG 12 Lead Other; Sepsis   • ECG 12 Lead Tachycardia   • Adult Transthoracic Echo Complete w/ Color, Spectral and Contrast if necessary per protocol   • Wound Ostomy Eval & Treat   • Insert Peripheral IV   • Insert 2nd Large Bore Peripheral IV   • Insert Peripheral IV   • Inpatient Admission   • CBC & Differential   • Green Top (Gel)   • Lavender Top   • Gold Top - SST   • Light Blue Top   • CBC & Differential   • CBC & Differential       Medications Given in the Emergency Department:  Medications   norepinephrine (LEVOPHED) 8 mg in 250 mL NS infusion (premix) (0.5 mcg/kg/min × 70.8 kg Intravenous New Bag 5/26/23 0632)   sodium chloride 0.9 % flush 10 mL (has no administration in time range)   sodium chloride 0.9 % flush 10 mL (has no administration in time range)   sodium chloride 0.9 % infusion 40 mL (has no administration in time range)   sennosides-docusate (PERICOLACE) 8.6-50 MG per tablet 2 tablet (has no administration in time range)     And   polyethylene glycol (MIRALAX) packet 17 g (has no administration in time range)     And   bisacodyl (DULCOLAX) EC tablet 5 mg (has no administration in time range)     And   bisacodyl (DULCOLAX) suppository 10 mg (has no administration in time range)   nitroglycerin (NITROSTAT) SL tablet 0.4 mg (has no administration in time range)   lactated ringers infusion (has no administration in time range)   aspirin suppository 300 mg (has no administration in time range)   clopidogrel (PLAVIX) tablet 75 mg (has no administration in time range)   atorvastatin (LIPITOR) tablet 80 mg (has no administration in time range)   cefepime (MAXIPIME)  IVPB 2 g (premix) in D5 (has no administration in time range)   Pharmacy to dose vancomycin (has no administration in time range)   EPINEPHrine 5 mg in 250 mL NS infusion (0.05 mcg/kg/min × 70.8 kg Intravenous New Bag 5/26/23 0687)   heparin 02185 units/250 mL (100 units/mL) in 0.9% NaCl infusion (has no administration in time range)   heparin bolus from bag 3,500 Units (has no administration in time range)   heparin bolus from bag 1,800 Units (has no administration in time range)   potassium chloride 10 mEq in 100 mL IVPB (has no administration in time range)   dextrose (GLUTOSE) oral gel 15 g (has no administration in time range)   dextrose (D50W) (25 g/50 mL) IV injection 25 g (has no administration in time range)   glucagon (GLUCAGEN) injection 1 mg (has no administration in time range)   insulin detemir (LEVEMIR) injection 30 Units (has no administration in time range)   insulin regular (humuLIN R,novoLIN R) injection 10 Units (has no administration in time range)   insulin regular (humuLIN R,novoLIN R) injection 2-7 Units (has no administration in time range)   sodium chloride 0.9 % bolus 1,000 mL (0 mL Intravenous Stopped 5/26/23 0240)   cefepime (MAXIPIME) IVPB 2 g (premix) in D5 (0 g Intravenous Stopped 5/26/23 0317)   sodium chloride 0.9 % bolus 1,000 mL (0 mL Intravenous Stopped 5/26/23 0240)   vancomycin 1500 mg/250 mL 0.9% NS IVPB ([facility]) (0 mg Intravenous Stopped 5/26/23 0426)   methylPREDNISolone sodium succinate (SOLU-Medrol) injection 125 mg (125 mg Intravenous Given 5/26/23 0443)        ED Course:    ED Course as of 05/26/23 0818   Fri May 26, 2023   0138 My interpretation of EKG: Sinus tachycardia 105 occasional PVC, left bundle branch block pattern, nonspecific ST change, no prior comparison available at this time. [JS]   0340 Sepsis criteria was met in the emergency department and the Sepsis protocol (including antibiotic administration) was initiated.      SIRS criteria considered:   1.   Temperature > 100.4 or <98.6    2.  Heart Rate > 90    3.  Respiratory Rate > 22    4.  WBC > 12K or <4K.             Severe Sepsis:     Respiratory: Mechanical Ventilation or BiPAP  Hypotension: SBP > 90 or MAP < 65  Renal: Creatinine > 2  Metabolic: Lactic Acid > 2  Hematologic: Platelets < 100K or INR > 1.5  Hepatic: BILI  >  2  CNS: Sudden AMS     Septic Shock:     Severe Sepsis + Persistent hypotension or Lactic Acid > 4     Normal saline bolus, Antibiotics, and final disposition was based on these definitions.        Sepsis was recognized at 0310    Antibiotics were ordered.       30 mL/kg bolus was indicated.         The patient was ordered 1L of fluids.    Total Critical Care time of 35 minutes. Total critical care time documented does not include time spent on separately billed procedures for services of nurses or physician assistants. I personally saw and examined the patient. I have reviewed all diagnostic interpretations and treatment plans as written. I was present for the key portions of any procedures performed and the inclusive time noted in any critical care statement. Critical care time includes patient management by me, time spent at the patient's bedside,  time to review lab and imaging results, discussing patient care, documentation in the medical record, and time spent with family or caregiver.   [JS]      ED Course User Index  [JS] Manuelito Lora MD       Labs:    Lab Results (last 24 hours)     Procedure Component Value Units Date/Time    CBC & Differential [[id number]]  (Abnormal) Collected: 05/26/23 0142    Specimen: Blood Updated: 05/26/23 0204    Narrative:      The following orders were created for panel order CBC & Differential.  Procedure                               Abnormality         Status                     ---------                               -----------         ------                     CBC Auto Differential[[id number]]        Abnormal            Final result                Scan Slide[[id number]]                                                                    Please view results for these tests on the individual orders.    Comprehensive Metabolic Panel [[id number]]  (Abnormal) Collected: 05/26/23 0142    Specimen: Blood Updated: 05/26/23 0223     Glucose 172 mg/dL      BUN 25 mg/dL      Creatinine 2.07 mg/dL      Sodium 137 mmol/L      Potassium 3.4 mmol/L      Comment: Slight hemolysis detected by analyzer. Results may be affected.        Chloride 96 mmol/L      CO2 19.0 mmol/L      Calcium 9.6 mg/dL      Total Protein 7.2 g/dL      Albumin 3.8 g/dL      ALT (SGPT) 99 U/L      AST (SGOT) 192 U/L      Comment: Slight hemolysis detected by analyzer. Results may be affected.        Alkaline Phosphatase 129 U/L      Total Bilirubin 1.4 mg/dL      Globulin 3.4 gm/dL      A/G Ratio 1.1 g/dL      BUN/Creatinine Ratio 12.1     Anion Gap 22.0 mmol/L      eGFR 27.5 mL/min/1.73     Narrative:      GFR Normal >60  Chronic Kidney Disease <60  Kidney Failure <15      Protime-INR [[id number]]  (Abnormal) Collected: 05/26/23 0142    Specimen: Blood Updated: 05/26/23 0159     Protime 17.1 Seconds      INR 1.40    Narrative:      Suggested Therapeutic Ranges For Oral Anticoagulant Therapy:  Level of Therapy                      INR Target Range  Standard Dose                            2.0-3.0  High Dose                                2.5-3.5  Patients not receiving anticoagulant  Therapy Normal Range                     0.86-1.15    Procalcitonin [[id number]]  (Abnormal) Collected: 05/26/23 0142    Specimen: Blood Updated: 05/26/23 0252     Procalcitonin >400.00 ng/mL     Narrative:      As a Marker for Sepsis (Non-Neonates):    1. <0.5 ng/mL represents a low risk of severe sepsis and/or septic shock.  2. >2 ng/mL represents a high risk of severe sepsis and/or septic shock.    As a Marker for Lower Respiratory Tract Infections that require antibiotic therapy:    PCT on Admission    Antibiotic Therapy  "      6-12 Hrs later    >0.5                Strongly Recommended  >0.25 - <0.5        Recommended  0.1 - 0.25          Discouraged              Remeasure/reassess PCT  <0.1                Strongly Discouraged     Remeasure/reassess PCT    As 28 day mortality risk marker: \"Change in Procalcitonin Result\" (>80% or <=80%) if Day 0 (or Day 1) and Day 4 values are available. Refer to [email or web address]    Change in PCT <=80%  A decrease of PCT levels below or equal to 80% defines a positive change in PCT test result representing a higher risk for 28-day all-cause mortality of patients diagnosed with severe sepsis for septic shock.    Change in PCT >80%  A decrease of PCT levels of more than 80% defines a negative change in PCT result representing a lower risk for 28-day all-cause mortality of patients diagnosed with severe sepsis or septic shock.    This test is Prognostic not Diagnostic, if elevated correlate with clinical findings before administering antibiotic treatment.        High Sensitivity Troponin T [[id number]]  (Abnormal) Collected: 05/26/23 0142    Specimen: Blood Updated: 05/26/23 0223     HS Troponin T 303 ng/L     Narrative:      High Sensitive Troponin T Reference Range:  <10.0 ng/L- Negative Female for AMI  <15.0 ng/L- Negative Male for AMI  >=10 - Abnormal Female indicating possible myocardial injury.  >=15 - Abnormal Male indicating possible myocardial injury.   Clinicians would have to utilize clinical acumen, EKG, Troponin, and serial changes to determine if it is an Acute Myocardial Infarction or myocardial injury due to an underlying chronic condition.         Blood Culture - Blood, Arm, Left [[id number]] Collected: 05/26/23 0142    Specimen: Blood from Arm, Left Updated: 05/26/23 0147    Blood Culture - Blood, Arm, Left [[id number]] Collected: 05/26/23 0142    Specimen: Blood from Arm, Left Updated: 05/26/23 0148    Lactic Acid, Plasma [[id number]]  (Abnormal) Collected: 05/26/23 " 0142    Specimen: Blood Updated: 05/26/23 0223     Lactate 9.6 mmol/L     CBC Auto Differential [[id number]]  (Abnormal) Collected: 05/26/23 0142    Specimen: Blood Updated: 05/26/23 0204     WBC 10.29 10*3/mm3      RBC 5.46 10*6/mm3      Hemoglobin 15.1 g/dL      Hematocrit 46.4 %      MCV 85.0 fL      MCH 27.7 pg      MCHC 32.5 g/dL      RDW 14.6 %      RDW-SD 44.4 fl      MPV 10.9 fL      Platelets 95 10*3/mm3      Neutrophil % 90.7 %      Lymphocyte % 6.5 %      Monocyte % 1.0 %      Eosinophil % 0.4 %      Basophil % 0.3 %      Immature Grans % 1.1 %      Neutrophils, Absolute 9.34 10*3/mm3      Lymphocytes, Absolute 0.67 10*3/mm3      Monocytes, Absolute 0.10 10*3/mm3      Eosinophils, Absolute 0.04 10*3/mm3      Basophils, Absolute 0.03 10*3/mm3      Immature Grans, Absolute 0.11 10*3/mm3      nRBC 0.4 /100 WBC     ABG with Co-Ox and Electrolytes [[id number]]  (Abnormal) Collected: 05/26/23 0142    Specimen: Arterial Blood from Arm, Left Updated: 05/26/23 0145     pH, Arterial 7.429 pH units      pCO2, Arterial 29.8 mm Hg      pO2, Arterial 78.0 mm Hg      HCO3, Arterial 19.3 mmol/L      Base Excess, Arterial -3.7 mmol/L      O2 Saturation, Arterial 95.3 %      Hemoglobin, Blood Gas 15.2 g/dL      Carboxyhemoglobin 2.0 %      Methemoglobin 0.20 %      Oxyhemoglobin 93.2 %      FHHB 4.6 %      Shane's Test Positive     Note 2l     Site Arterial: left radial     Modality Cannula - Nasal     FIO2 28 %      Flow Rate 2 lpm      Sodium, Arterial 134.6 mmol/L      Potassium, Arterial 3.22 mmol/L      Ionized Calcium, Arterial 1.11 mmol/L      Chloride, Arterial 100 mmol/L      Glucose, Arterial 181 mg/dL      Lactate, Arterial 6.56 mmol/L      PO2/FIO2 279    Urinalysis With Culture If Indicated - Straight Cath [[id number]]  (Abnormal) Collected: 05/26/23 0225    Specimen: Urine from Straight Cath Updated: 05/26/23 0301     Color, UA Yellow     Appearance, UA Clear     pH, UA 6.5     Specific [place], UA 1.013      Glucose, UA Negative     Ketones, UA Negative     Bilirubin, UA Negative     Blood, UA Moderate (2+)     Protein,  mg/dL (2+)     Leuk Esterase, UA Negative     Nitrite, UA Negative     Urobilinogen, UA 0.2 E.U./dL    Narrative:      In absence of clinical symptoms, the presence of pyuria, bacteria, and/or nitrites on the urinalysis result does not correlate with infection.    Urinalysis, Microscopic Only - Straight Cath [[id number]]  (Abnormal) Collected: 05/26/23 0225    Specimen: Urine from Straight Cath Updated: 05/26/23 0331     RBC, UA 0-2 /HPF      WBC, UA 0-2 /HPF      Comment: Urine culture not indicated.        Bacteria, UA None Seen /HPF      Squamous Epithelial Cells, UA 3-6 /HPF      Hyaline Casts, UA 0-2 /LPF      Methodology Automated Microscopy    Wound Culture - Swab, Leg, Right [[id number]] Collected: 05/26/23 0305    Specimen: Swab from Leg, Right Updated: 05/26/23 0308    High Sensitivity Troponin T 2Hr [[id number]]  (Abnormal) Collected: 05/26/23 0338    Specimen: Blood from Arm, Left Updated: 05/26/23 0440     HS Troponin T 256 ng/L      Troponin T Delta -47 ng/L     Narrative:      High Sensitive Troponin T Reference Range:  <10.0 ng/L- Negative Female for AMI  <15.0 ng/L- Negative Male for AMI  >=10 - Abnormal Female indicating possible myocardial injury.  >=15 - Abnormal Male indicating possible myocardial injury.   Clinicians would have to utilize clinical acumen, EKG, Troponin, and serial changes to determine if it is an Acute Myocardial Infarction or myocardial injury due to an underlying chronic condition.         Salicylate Level [[id number]]  (Normal) Collected: 05/26/23 0338    Specimen: Blood from Arm, Left Updated: 05/26/23 0641     Salicylate <0.3 mg/dL     Acetaminophen Level [[id number]]  (Normal) Collected: 05/26/23 0338    Specimen: Blood from Arm, Left Updated: 05/26/23 0641     Acetaminophen <5.0 mcg/mL     STAT Lactic Acid, Reflex [[id number]]  (Abnormal) Collected: 05/26/23  0455    Specimen: Blood from Arm, Left Updated: 05/26/23 0625     Lactate 4.9 mmol/L     STAT Lactic Acid, Reflex [[id number]]  (Abnormal) Collected: 05/26/23 0708    Specimen: Blood Updated: 05/26/23 0742     Lactate 3.1 mmol/L     aPTT [[id number]] Collected: 05/26/23 0708    Specimen: Blood Updated: 05/26/23 0714    Fibrinogen [[id number]] Collected: 05/26/23 0708    Specimen: Blood Updated: 05/26/23 0714    Magnesium [[id number]]  (Abnormal) Collected: 05/26/23 0708    Specimen: Blood Updated: 05/26/23 0736     Magnesium 1.4 mg/dL     Basic Metabolic Panel [[id number]]  (Abnormal) Collected: 05/26/23 0708    Specimen: Blood Updated: 05/26/23 0736     Glucose 168 mg/dL      BUN 30 mg/dL      Creatinine 1.32 mg/dL      Sodium 136 mmol/L      Potassium 3.5 mmol/L      Chloride 103 mmol/L      CO2 17.9 mmol/L      Calcium 8.0 mg/dL      BUN/Creatinine Ratio 22.7     Anion Gap 15.1 mmol/L      eGFR 47.2 mL/min/1.73     Narrative:      GFR Normal >60  Chronic Kidney Disease <60  Kidney Failure <15      POC Glucose Once [[id number]]  (Abnormal) Collected: 05/26/23 0808    Specimen: Blood Updated: 05/26/23 0810     Glucose 152 mg/dL      Comment: Serial Number: [id number]:  [id number]              Imaging:    CT Head Without Contrast    Result Date: 5/26/2023  PROCEDURE: CT HEAD WO CONTRAST  COMPARISONS: 5/4/2023; 1/19/2018.  INDICATIONS: 57-year-old female w/ h/o mental status change, unknown cause; the patient was found on the floor; h/o lymphoma, not otherwise specified.  PROTOCOL:   Standard CT imaging protocol performed.    RADIATION:   Total DLP: 2,224.2 mGy*cm.   MA and/or KV were/was adjusted to minimize radiation dose.    TECHNIQUE: After obtaining the patient's consent, 282 CT images were obtained without non-ionic intravenous contrast material.  There is slight motion artifact on the study.  The best possible images were obtained.  DISCUSSION:   A routine nonenhanced head CT was performed.  No acute brain  abnormality is identified.  No acute intracranial hemorrhage.  No acute infarct is seen.  No acute skull fracture.  No midline shift or acute intracranial mass effect is seen.  The ventricular size and configuration are within normal limits.  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 Mild age-indeterminate mucosal thickening involves the imaged paranasal sinuses.  No air-fluid interfaces are seen within the imaged paranasal sinuses.  There is a prominent torus palatinus which is partially imaged.       No acute brain abnormality is seen. Specifically, no acute intracranial hemorrhage, no acute infarct, no significant intracranial mass lesion, and no acute intracranial mass effect are appreciated.   The study is motion-limited.    Please note that portions of this note were completed with a voice recognition program.  JEN JONES JR, MD       Electronically Signed and Approved By: JEN JONES JR, MD on 5/26/2023 at 2:22              XR Chest 1 View    Result Date: 5/26/2023  PROCEDURE: XR CHEST 1 VW  COMPARISONS: 5/4/2023; 11/17/2022.  INDICATIONS: Possible sepsis; h/o lymphoma.  FINDINGS:  A single AP (or PA) upright portable chest radiograph was performed.  No cardiac enlargement is seen.  No acute infiltrate is appreciated.  No pleural effusion or pneumothorax is identified.  There is a left IJ central venous line in place with its distal tip in the expected lower superior vena cava (SVC).  The patient has undergone transcatheter aortic valve replacement (TAVR), seen previously.  The thoracic aorta is atherosclerotic.  There is emphysematous contour of the lungs.  Chronic calcified granulomatous disease involves the chest also.  There is pulmonary hypoinflation.  External artifacts obscure detail.  No significant interval change is seen since the prior study (or studies).        No acute infiltrate is appreciated.    Please note that portions of this note  were completed with a voice recognition program.  JEN JONES JR, MD       Electronically Signed and Approved By: JEN JONES JR, MD on 5/26/2023 at 2:27                  Differential Diagnosis and Discussion:    Altered Mental Status: Based on the patient's signs and symptoms, differential diagnosis includes but is not limited to meningitis, stroke, sepsis, subarachnoid hemorrhage, intracranial bleeding, encephalitis, and metabolic encephalopathy.    All labs were reviewed and interpreted by me.  All X-rays impressions were independently interpreted by me.  EKG was interpreted by me.    [facility]     Critical Care Note: Total Critical Care time of 35 minutes. Total critical care time documented does not include time spent on separately billed procedures for services of nurses or physician assistants. I personally saw and examined the patient. I have reviewed all diagnostic interpretations and treatment plans as written. I was present for the key portions of any procedures performed and the inclusive time noted in any critical care statement. Critical care time includes patient management by me, time spent at the patients bedside,  time to review lab and imaging results, discussing patient care, documentation in the medical record, and time spent with family or caregiver.    Patient Care Considerations:    CT ABDOMEN AND PELVIS: I considered ordering a CT scan of the abdomen and pelvis however No abdominal tenderness      Consultants/Shared Management Plan:    Hospitalist: I have discussed the case with The hospitalist who agrees to accept the patient for admission.    Social Determinants of Health:          Disposition and Care Coordination:    Admit:   Through independent evaluation of the patient's history, physical, and imperical data, the patient meets criteria for observation/admission to the hospital.        Final diagnoses:   Septic shock   Lactic acidosis   Elevated troponin   Acute renal failure,  unspecified acute renal failure type   Acute metabolic encephalopathy        ED Disposition     ED Disposition   Decision to Admit    Condition   --    Comment   Level of Care: Critical Care [6]   Diagnosis: Septic shock [[id number]]   Admitting Physician: LENNOX JARRELL [[id number]]   Attending Physician: LENNOX JARRELL [[id number]]   Certification: I Certify That Inpatient Hospital Services Are Medically Necessary For Greater Than 2 Midnights               This medical record created using voice recognition software.        Documentation assistance provided by Joie Jaramillo acting as scribe for Manuelito Lora MD. Information recorded by the scribe was done at my direction and has been verified and validated by me.          Joie Jaramillo  05/26/23 0130       Manuelito Lora MD  05/26/23 0818

## 2023-05-26 NOTE — H&P
[facility] HISTORY AND PHYSICAL  Date: 2023   Patient Name: Isha Llanes  : 1965  MRN: [id number]  Primary Care Physician:  Vidal Luevano APRN  Date of admission: 2023    Subjective   Subjective     Chief Complaint: Altered mental status    HPI:    Isha Llanes is a 57 y.o. female brought into the emergency department for evaluation of altered mental status.  Daughter, who was at bedside, found the patient unconscious in her kitchen.  She was slumped over in her wheelchair upon arrival.  EMS was called, was transported to emergency department for evaluation.  Daughter reports that the patient had defecated on herself at the time of being found.  Reportedly, patient was recently diagnosed with throat cancer.  She has a history of lymphoma.  History of mechanical valve not on anticoagulation.  Daughter reports that the patient has a left upper chest wall Mediport, and history of persistently positive blood cultures, requiring frequent IV antibiotic infusions.  She currently the process of getting the Mediport removed.  It is inaccessible.  Last known normal was the patient was yesterday, as a daughter spoke with her and visited her.  Daughter reports that they usually conference every evening via phone, and the patient did not call the daughter, daughter called the patient for approximately 1 hour without answer.  Patient's daughter went to perform a wellness check, found the patient unresponsive.      Personal History     Past Medical History:  • Anxiety       NO CURRENT MEDICATION   • Aortic stenosis, severe     • Arthritis     • Asthma     • Back pain     • Blood clotting disorder (HCC)     • Cancer associated pain     • Chronic low back pain with sciatica     • Diabetes mellitus (HCC)       TYPE 2   • Dyspnea on effort     • GERD (gastroesophageal reflux disease)     • H/O lumpectomy     • H/O: hysterectomy     • Hiatal hernia     • History of degenerative disc  disease     • History of kidney stones     • History of pulmonary embolus (PE)     • History of transfusion     • Hodgkin's lymphoma (HCC)     • Immobility     • Lupus (HCC)     • Mitral valve prolapse     • Pelvic pain     • Peripheral neuropathy     • Personal history of DVT (deep vein thrombosis)     • Presence of intrathecal pump     • Sacroiliac joint disease     • SI (sacroiliac) joint inflammation (HCC)     • Venous insufficiency          Past Surgical History:  • BACK SURGERY         SPINAL FUSION    • BLADDER REPAIR       • CARDIAC CATHETERIZATION N/A 3/6/2019     Procedure: Valvuloplasty;  Surgeon: Wayne Johnson MD;  Location: [facility] CATH INVASIVE LOCATION;  Service: Cardiology   • CARDIAC VALVE REPLACEMENT   2021   • CHOLECYSTECTOMY       • COLONOSCOPY N/A 12/29/2021     Procedure: COLONOSCOPY;  Surgeon: Karine Orlando MD;  Location: [facility] ENDOSCOPY;  Service: Gastroenterology;  Laterality: N/A;  POOR PREP   • ENDOSCOPY N/A 12/29/2021     Procedure: ESOPHAGOGASTRODUODENOSCOPY;  Surgeon: Karine Orlando MD;  Location: [facility] ENDOSCOPY;  Service: Gastroenterology;  Laterality: N/A;  ESOPHAGITIS, GASTRITIS, HIATAL HERNIA   • HYSTERECTOMY       • LYMPHADENECTOMY       • PACEMAKER IMPLANTATION       • PAIN PUMP INSERTION/REVISION N/A 10/18/2019     Procedure: PAIN PUMP INSERTION \[facility]\"" 10-18-19 [place];  Surgeon: Horace Rios MD;  Location: [facility];  Service: Pain Management   • PAIN PUMP INSERTION/REVISION N/A 11/23/2020     Procedure: pain pump removal;  Surgeon: Horace Rios MD;  Location: [facility];  Service: Pain Management;  Laterality: N/A;   • TONSILLECTOMY       • TUBAL ABDOMINAL LIGATION       • TUMOR REMOVAL       Allergies: Penicillin, sulfa drugs    Family History   Problem Relation Age of Onset   • Pancreatic cancer Sister     • Pancreatic cancer Paternal Grandmother     • Malig Hyperthermia Neg Hx           Social History:   reports that she  has been smoking cigarettes. She has a 82.00 pack-year smoking history. She has never used smokeless tobacco. She reports that she does not drink alcohol and does not use drugs.      Home Medications: Currently being about by nursing staff, will be reviewed when available.       Review of Systems   All systems were reviewed and negative except for: Altered mental status, somnolence    Objective   Objective     Vitals:   Temp:  [99.1 °F (37.3 °C)] 99.1 °F (37.3 °C)  Heart Rate:  [] 113  Resp:  [18-22] 22  BP: ()/(12-73) 83/50  Flow (L/min):  [2] 2    Physical Exam -limited secondary to altered mental status.   Constitutional: Somnolent, snoring, chronically ill-appearing.   Eyes: Pupils equal, sclerae anicteric, no conjunctival injection   HENT: NCA, mucous membranes dry   Neck: Supple, no thyromegaly, no lymphadenopathy, trachea midline   Respiratory: Poor breath sounds all lung fields, distant lung sounds, clear otherwise.  Intercostal muscle use noted   Cardiovascular: RRR, 2+ systolic murmur, rubs, or gallops, palpable pedal pulses bilaterally   Gastrointestinal: Positive bowel sounds, soft, nontender, distended secondary to obesity   Musculoskeletal: No bilateral ankle edema, no clubbing or cyanosis to extremities   Psychiatric: Unable to be assessed secondary to altered mental status.   Neurologic: Unable to be assessed secondary to altered mental status.   Skin: No rashes, patient has chronic venous stasis dermatitis bilateral lower extremities.  Small, punctate, chronic appearing and healing wounds bilateral lower extremities.  No gross signs of infection.    Result Review    Result Review:  I have personally reviewed the results from the time of this admission to 5/26/2023 04:54 EDT and agree with these findings:  [x]  Laboratory  [x]  Microbiology  [x]  Radiology  [x]  EKG/Telemetry   [x]  Cardiology/Vascular   []  Pathology  [x]  Old records  []  Other:      Assessment & Plan   Assessment /  Plan     Assessment/Plan:   • Septic shock-patient meets sepsis criteria with tachycardia, tachypnea, lactic acidosis of 9, procalcitonin too high to read source of infection presumably to be bacteremia.  Patient has a significant past medical history of persistently positive blood cultures, is required multiple IV antibiotic infusions.  Per the daughter at bedside, patient is in the midst of getting her left upper chest wall Mediport removed.  Continue broad-spectrum antibiotics, IV vancomycin, IV cefepime.  Blood cultures.  IV pressor support.  • NSTEMI, type I versus type II-patient has a chronic left bundle branch block, however, initial troponin is 300.  We will continue heparinization.  Obtain a transthoracic echo.  Patient does have a heart murmur, and combination with persistently positive blood cultures, and a mechanical valve without anticoagulation, intracardiac thrombus needs to be ruled out.  • Possible cardiogenic shock-patient having persistently low blood pressures, presence of NSTEMI.  We will continue to monitor.  • Acute on chronic hypoxic respiratory failure-patient wears 2 L nasal cannula as needed at home, she is requiring constant oxygen supplementation fluctuated between 2 to 4 L in the ED.  Noted intercostal muscle use.  Supportive care.  Patient should be intubated if there is concerns for airway protection.  • Acute liver injury-cannot exclude shock liver as etiology.  Check salicylate, acetaminophen, acute hepatitis panel.  • Acute kidney injury-IV fluids.  If no improvement the next 24 to 48 hours, consider nephrology consultation.  Avoid nephrotoxic agents as appropriate  • Altered mental status-CT scan of the head without contrast initially negative for stroke.  Likely metabolic in origin given the above assessments.  Patient withdrawing from pain, moves all extremities and doing so.  She has chronic contractures of the bilateral lower extremities.  Less likely to be CVA.  If suspicion  increases, proceed with MRI of the head.  We will continue to monitor.  • Uncontrolled type 2 diabetes mellitus with hyperglycemia-sliding scale insulin.  Weight-based dosing.  Target blood sugar less than 200.  • Hypokalemia-replete, recheck.  • Lactic acidosis-clinically significant.  Plan as above.  • Wheelchair-bound status  • Reported mechanical valve, not on anticoagulation.      DVT prophylaxis: Heparin drip    CODE STATUS: Full code       Admission Status:  I believe this patient meets inpatient status.    Electronically signed by Des Rocha DO, 05/26/23, 4:54 AM EDT.

## 2023-05-26 NOTE — CONSULTS
"Nutrition Services    Patient Name: Isha Llanes  YOB: 1965  MRN: [id number]  Admission date: 5/26/2023      CLINICAL NUTRITION ASSESSMENT      Reason for Assessment  Physician consult, EN   H&P:    No past medical history on file.     Current Problems:   Active Hospital Problems    Diagnosis    • **Septic shock         Nutrition/Diet History         Narrative     RD consulted for tube feeding assessment and Cortrak placement. Pt with septic shock and AMS. No weight or medical hx available. Tube feeding recommendations below and orders placed.      Anthropometrics        Current Height, Weight Height: 162.6 cm (64\")  Weight: 73.4 kg (161 lb 13.1 oz)   Current BMI Body mass index is 27.78 kg/m².       Weight Hx  Wt Readings from Last 30 Encounters:   05/26/23 0628 73.4 kg (161 lb 13.1 oz)   05/26/23 0121 70.8 kg (156 lb 1.4 oz)            Wt Change Observation GI     Estimated/Assessed Needs       Energy Requirements 25-30 kcal/kg adj BW   EST Needs (kcal/day) 8630-4344 kcal       Protein Requirements 0.8-1.0 g/kg adj BW   EST Daily Needs (g/day) 47-59 g       Fluid Requirements 1 ml/kcal    Estimated Needs (mL/day) 1910-3178 ml     Labs/Medications         Pertinent Labs Reviewed.   Results from last 7 days   Lab Units 05/26/23  0708 05/26/23  0142   SODIUM mmol/L 136 137   SODIUM, ARTERIAL mmol/L  --  134.6*   POTASSIUM mmol/L 3.5 3.4*   CHLORIDE mmol/L 103 96*   CO2 mmol/L 17.9* 19.0*   BUN mg/dL 30* 25*   CREATININE mg/dL 1.32* 2.07*   CALCIUM mg/dL 8.0* 9.6   BILIRUBIN mg/dL  --  1.4*   ALK PHOS U/L  --  129*   ALT (SGPT) U/L  --  99*   AST (SGOT) U/L  --  192*   GLUCOSE mg/dL 168* 172*   GLUCOSE, ARTERIAL mg/dL  --  181*     Results from last 7 days   Lab Units 05/26/23  0708 05/26/23  0142   MAGNESIUM mg/dL 1.4*  --    HEMOGLOBIN g/dL  --  15.1   HEMATOCRIT %  --  46.4     No results found for: COVID19  No results found for: HGBA1C      Pertinent Medications Reviewed.     Current Nutrition " Orders & Evaluation of Intake       Oral Nutrition     Current PO Diet NPO Diet NPO Type: Strict NPO   Supplement Orders Placed This Encounter      Place Feeding Tube Per [facility] System       Malnutrition Severity Assessment                Nutrition Diagnosis         Nutrition Dx Problem 1 Inadequate energy Intake related to decreased ability to consume sufficient energy as evidenced by NPO     Nutrition Intervention         Diabetisource AC @ 55 ml/hr  FWF 65 q3h  Provides: 1584 kcal, 79 g pro, 1602 ml fluid     Medical Nutrition Therapy/Nutrition Education          Learner     Readiness Patient  Education not appropriate at this time     Method     Response N/A  N/A     Monitor/Evaluation        Monitor Per protocol, Pertinent labs, EN delivery/tolerance, POC/GOC     Nutrition Discharge Plan         To be determined     Electronically signed by:  Sobeida Lund RD  05/26/23 13:56 EDT

## 2023-05-26 NOTE — CONSULTS
Date of service: 5/26/2023    Reason for consultation: Elevated troponin    HPI: A 57-year-old female with multiple medical problems as will be stated below is admitted with altered mental status.  The patient is lethargic at this time.  The medical history was obtained from the medical staff and her medical records.  Apparently, her daughter found her unconscious in her kitchen.  No records indicated that she had specific symptoms prior to   altered mental status.  She has a significant medical history as will be stated below.  HS Troponin T is elevated.    At this time, the patient is lethargic but resting without apparent pains.  He has been on vasopressors for hypotension.    Review of systems: Unobtainable at this time    Past medical surgical history:    1.  Aortic stenosis status post AVR.  No oral anticoagulation  2.  Pulmonary embolism  3.  Throat cancer  4.  Hodgkin lymphoma  5.  DM type II and peripheral neuropathy  6.  DVT and clotting disorders  7.  Nephrolithiasis  8.  Systemic lupus  9.  Questionable Mitral valve prolapse  10.  Anxiety  11.  Neck back pain  12.  Degenerative joint disease  13.  Surgeries-procedures as stated above.  EGD and colonoscopy.  Back surgery hysterectomy, lymphadenectomy, pacemaker implantation, tonsillectomy pain pump insertion-revision    Medications: See the patient's medication list.    Allergies: Penicillins and amoxicillin    Family and social history: Obtainable at this time    Physical examination: She was in no respiratory distress.  Vital signs: Reviewed  HEENT: Sclera nonicteric.  Neck: No JVD or carotid bruit  Chest: Poor air entry.  CTA.  Cardiac: RRR.  2/6 stock murmur over the left sternal border.  No S3 gallop.  Abdomen: Soft and nontender  Extremities: 1/4 bilateral lower leg and ankle edema.  Chronic dermatitic skin changes  Neuro: Lethargic.    Records: Reviewed    Assessment and plan:    1.  Elevated troponin T.  Consider demand ischemia versus acute  non-STEMI.  Will obtain CK-MB  2.  Septic shock.  On vasopressors.  3.  Acute kidney injury   4.  Altered mental status.  (The primary service  5.  Echocardiography: Pending  6.  Hypokalemia, corrected:  7.  Acute renal injury.  Baseline renal function is unknown.  8.  Other medical problems as stated above  9.  Continue the same management for now.  Any further cardiac work based on her clinical course and the test results.

## 2023-05-26 NOTE — CONSULTS
Pulmonary / Critical Care Consult Note      Patient Name: Isha Llanes  : 1965  MRN: [id number]  Primary Care Physician:  Vidal Luevano APRN  Referring Physician: Mic Herrera MD  Date of admission: 2023    Subjective   Subjective     Reason for Consult/ Chief Complaint: Septic shock    HPI:  Isha Llanes is a 57 y.o. female with recent diagnosis of throat cancer, history of lymphoma, type 2 diabetes, aortic stenosis s/p TAVR, chronic pain with indwelling pain pump presented to the ED after family found her slumped over.  Patient was found to be hypotensive with BP in the 60s, temperature 99.1. Unable to obtain detailed history from pt. Laboratory  findings showed WBC 10, hemoglobin 15, platelets 95, lactic acid 9.6, Pro-Ge greater than 400, creatinine 2.0, potassium 3.0, CO2 19, ALT/AST 99/192.  She was given IV fluid resuscitation, initiated on antibiotics in the emergency room.   Additionally she was placed on Levophed/and epi for hypotension.    Admitted to the intensive care unit for critical care management     Currently, patient very lethargic and wakes up and grimaces to pain but not really communicating well.  Is on norepinephrine and epinephrine drips.  Very weak and fatigued.  Is able to protect airway at this time.    Review of Systems  Cannot obtain due to confusion with lethargy and altered mental status      Personal History     Cannot obtain medical history due to confusion and lethargy with altered mental status    Cannot obtain surgical history due to confusion and lethargy with altered mental status    Cannot obtain family history due to confusion and lethargy with altered mental status    Cannot obtain social history due to confusion and lethargy with altered mental status    Home Medications:  Insulin Lispro (1 Unit Dial), LORazepam, acetaminophen, albuterol sulfate HFA, bacitracin, benzocaine-menthol, cetirizine, clopidogrel, furosemide, gabapentin, insulin  detemir, loperamide, pain, and pantoprazole    Allergies:  Allergies   Allergen Reactions   • Amoxicillin Shortness Of Breath   • Penicillins Shortness Of Breath       Objective    Objective     Vitals:   Temp:  [98.8 °F (37.1 °C)-99.1 °F (37.3 °C)] 98.8 °F (37.1 °C)  Heart Rate:  [] 108  Resp:  [18-22] 22  BP: ()/(12-73) 92/48  Flow (L/min):  [2] 2    Physical Exam:  Vital Signs Reviewed   General: Frail elderly critically ill female, lethargic but arousable to painful stimuli  HEENT:  PERRL, EOMI.  OP, nares clear  Neck:  Supple, no JVD, no thyromegaly  Chest:   Poor aeration, trace rhonchi bilaterally, tympanic to percussion bilaterally, no work of breathing noted, port site is clean/dry/intact  CV: Sinus tachycardia, no MGR, pulses 2+, equal.  Abd:  Soft, NT, ND, + BS, no HSM  EXT:  no clubbing, no cyanosis, no edema, bilateral wounds lower extremities  Neuro:  A&Ox1, grimacing and groaning to pain, lethargic, responds to name, CN grossly intact, no focal deficits.  Skin: No rashes or lesions noted, extremities cold      Result Review    Result Review:  I have personally reviewed the results from the time of this admission to 5/26/2023 10:47 EDT and agree with these findings:  [x]  Laboratory  [x]  Microbiology  [x]  Radiology  [x]  EKG/Telemetry   [x]  Cardiology/Vascular   []  Pathology  [x]  Old records  []  Other:  Most notable findings include:       Lab 05/26/23  0708 05/26/23  0142   WBC  --  10.29   HEMOGLOBIN  --  15.1   HEMATOCRIT  --  46.4   PLATELETS  --  95*   SODIUM 136 137   SODIUM, ARTERIAL  --  134.6*   POTASSIUM 3.5 3.4*   CHLORIDE 103 96*   CO2 17.9* 19.0*   BUN 30* 25*   CREATININE 1.32* 2.07*   GLUCOSE 168* 172*   GLUCOSE, ARTERIAL  --  181*   CALCIUM 8.0* 9.6   TOTAL PROTEIN  --  7.2   ALBUMIN  --  3.8   GLOBULIN  --  3.4     Procalcitonin over 400  Lactic acid 9  Chest x-ray with no obvious airspace disease  Head CT with no acute pathology    Recent throat biopsy positive for  malignancy      Assessment & Plan   Assessment / Plan     Active Hospital Problems:  Active Hospital Problems    Diagnosis    • **Septic shock      Impression:  Impression:  Septic shock, present on admission  Altered mental status  Toxic/metabolic encephalopathy  Anion gap metabolic acidosis  Lactic acidosis, clinically significant  Acute kidney injury related to ATN versus prerenal etiology  Hypotension  Hypokalemia  Hypomagnesia   Thrombocytopenia  Transaminitis  Type 2 diabetes with hyperglycemia  Recent diagnosis of throat cancer  History of lymphoma  NSTEMI, likely demand type II  Severe aortic stenosis status post TAVR     Plan:  Continue IV fluid resuscitation per sepsis guidelines  Give additional 3 L lactated Ringer's   Trend lactic acid  Continue vasopressors, use Levophed, add vasopressin.  Discontinue epinephrine drip.  Goal mean arterial pressure greater than 65  We will discontinue heparin drip now as I feel the elevated troponin is secondary to demand ischemia  Echocardiogram ordered  Continue broad-spectrum antibiotics cefepime and vancomycin  Panculture blood urine and sputum, check urine antigens, MRSA PCR, unclear of source at this time  We will check CT abdomen, pelvis and chest without contrast  Patient does have Port-A-Cath, okay to access in use at this time.  I see no obvious evidence of port infection or bacteremia at this time.  We will discontinue heparin, check fibrinogen, follow platelets  Patient does not have mechanical valve-history of TAVR  Trend renal function, monitor replace electrolytes, replace potassium and magnesium IV  Continue optimize glucose with BAER/SSI  Trend LFTs  Hepatitis panel pending  We will order for core track to be placed.  Start tube feeds per dietitian recommendations  When clinically appropriate have speech evaluate patient  Check UDS  Monitor mental status closely.  Continue aspiration precautions.     DVT prophylaxis:  No DVT prophylaxis order currently  exists.     Code Status and Medical Interventions:   Ordered at: 05/26/23 0521     Level Of Support Discussed With:    Next of Kin (If No Surrogate)     Code Status (Patient has no pulse and is not breathing):    CPR (Attempt to Resuscitate)     Medical Interventions (Patient has pulse or is breathing):    Full Support     Comments:    daughter at bedside confirmed code status      Discussed extensively with family at bedside this morning      The patient is critically ill in the ICU with septic shock, acute kidney injury, altered mental status, multiple electrolyte disturbances, NSTEMI. Multidisciplinary bedside critical care rounds were performed with nursing staff, respiratory therapy, pharmacy, nutritional services, social work. I have personally reviewed the chart, labs and any pertinent imaging available.  We have spent 104 minutes of critical care time managing this patient.  Of this, I have spent 84 minutes of critical care time, excluding procedures, in the care of this patient in accordance with split shared billing.    I, LUZ Friedman, spent 20 minutes critical care time in accordance to split shared billing.    Electronically signed by Enzo Segal MD, 05/26/23, 10:49 AM EDT.

## 2023-05-26 NOTE — Clinical Note
Prepped: Left Wrist. Prepped with: ChloraPrep. The site was clipped. The patient was draped in a sterile fashion.

## 2023-05-26 NOTE — PROGRESS NOTES
"[facility] Clinical Pharmacy Services: Vancomycin Pharmacokinetic Initial Consult Note    Isha Llanes is a 57 y.o. female who is on day 1 of pharmacy to dose vancomycin for Sepsis and Central line infection .    Consult Information  Consulting Provider: Aj  Planned Duration of Therapy: 7 days per consult  Was Patient Receiving Prior to Admission/Consult?: Yes, last dose received  at 0412.  Loading Dose Ordered or Given: 1500 mg on  at 0412  PK/PD Target: Dose by Levels  Relevant ID History: hx of persistent bacteremia, with mediport device  Other Antimicrobials: Cefepime    Imaging Reviewed?: Yes    Microbiology Data  MRSA PCR performed: 23; Result: Pending  Culture/Source:    Bcx 2/: pending   MRSA PCR: pending    Vitals/Labs  Ht: 162.6 cm (64\"); Wt: 73.4 kg (161 lb 13.1 oz)  Temp (24hrs), Av.1 °F (37.3 °C), Min:99.1 °F (37.3 °C), Max:99.1 °F (37.3 °C)   Estimated Creatinine Clearance: 46.2 mL/min (A) (by C-G formula based on SCr of 1.32 mg/dL (H)).     Results from last 7 days   Lab Units 23  0708 23  0142   CREATININE mg/dL 1.32* 2.07*   WBC 10*3/mm3  --  10.29     Assessment/Plan:    Vancomycin Dose: will pulse dose at this time given unstable renal function; level to be obtained @1600 today; will re-assess need for scheduled dosing pending level   Vanc Random ordered for  at 1600  Patient has order for Complete Metabolic Panel    Pharmacy will follow patient's kidney function and will adjust doses and obtain levels as necessary. Thank you for involving pharmacy in this patient's care. Please contact pharmacy with any questions or concerns.                           Corrie Peñaloza RPH  Clinical Pharmacist    "

## 2023-05-26 NOTE — PROGRESS NOTES
"[facility] Clinical Pharmacy Services: Vancomycin Monitoring Note    Isha Llanes is a 57 y.o. female who is on day  of pharmacy to dose vancomycin for Sepsis.    Previous Vancomycin Dose:   1500 mg IV x1 (loading dose)  Imaging Reviewed?: Yes  Updated Cultures and Sensitivities:    Bcx 2/: pending   MRSA PCR: pending    Vitals/Labs  Ht: 162.6 cm (64\"); Wt: 73.4 kg (161 lb 13.1 oz)   Temp (24hrs), Av.2 °F (37.3 °C), Min:98.8 °F (37.1 °C), Max:99.7 °F (37.6 °C)   Estimated Creatinine Clearance: 46.2 mL/min (A) (by C-G formula based on SCr of 1.32 mg/dL (H)).     Results from last 7 days   Lab Units 23  1200 23  0708 23  0142   VANCOMYCIN RM mcg/mL <4.00*  --   --    CREATININE mg/dL  --  1.32* 2.07*   WBC 10*3/mm3  --   --  10.29     Assessment/Plan  Vancomycin level returned at <4; renal function rapidly improved - will schedule vancomycin 1250 q24h and continue to monitor for further renal function changes  Current Vancomycin Dose:  1250 mg IV every 24 hours; which provides the following predicted parameters:  Exposure target: AUC24 (range)400-600 mg/L.hr   AUC24,ss: 494 mg/L.hr  PAUC*: 80 %  Ctrough,ss: 17.5 mg/L  Pconc*: 32 %  Tox.: 13 %  Next Vanc Trough ordered for  at 1300  We will continue to monitor patient changes and renal function     Thank you for involving pharmacy in this patient's care. Please contact pharmacy with any questions or concerns.    Corrie Peñaloza RP  Clinical Pharmacist    "

## 2023-05-26 NOTE — Clinical Note
Hemostasis started on the left radial artery. R-Band was used in achieving hemostasis. Radial compression device applied to vessel. Hemostasis achieved successfully.

## 2023-05-26 NOTE — PAYOR COMM NOTE
PATIENT INFORMATION  Name:  Isha Llanes  MRN#:     [id number]  :  1965     INSURANCE MEMBER ID:     [id number]        ADMISSION INFORMATION  CLASS: Inpatient   DOS:  2023        CURRENT ATTENDING PROVIDER INFORMATION  Name/NPI Mic Herrera MD [[id number]]  Phone  Phone: [phone]        RENDERING FACILITY  Name:  [facility]   NPI:  [id number]  TID:  [id number]  Address:      [address] [place], [place] [address] [address]  Phone  [phone]        CASE MANAGEMENT CONTACT INFORMATION  Phone:      [phone]  Fax:           [phone]            HOSPITAL PROBLEM LIST and ADMISSION DIAGNOSES    Problems Addressed this Visit        Other    * (Principal) Septic shock - Primary    Relevant Medications    cefepime (MAXIPIME) IVPB 2 g (premix) in D5 (Completed)    cefepime (MAXIPIME) IVPB 2 g (premix) in D5 (Start on 2023  2:30 AM)   Other Visit Diagnoses     Lactic acidosis        Elevated troponin        Acute renal failure, unspecified acute renal failure type        Relevant Medications    furosemide (LASIX) 40 MG tablet    Acute metabolic encephalopathy          Diagnoses       Codes Comments    Septic shock    -  Primary ICD-10-CM: A41.9, R65.21  ICD-9-CM: 038.9, 785.52, 995.92     Lactic acidosis     ICD-10-CM: E87.20  ICD-9-CM: 276.2     Elevated troponin     ICD-10-CM: R77.8  ICD-9-CM: 790.6     Acute renal failure, unspecified acute renal failure type     ICD-10-CM: N17.9  ICD-9-CM: 584.9     Acute metabolic encephalopathy     ICD-10-CM: G93.41  ICD-9-CM: 348.31                  Isha Llanes (57 y.o. Female)     Date of Birth   1965    Social Security Number       Address   [address] [place] [address]    Home Phone   [phone]    MRN   [id number]       [employer]   None    Marital Status   Legally                             Admission Date   23    Admission Type   Emergency    Admitting Provider       Attending Provider  "  Mic Herrera MD    Department, Room/Bed   [facility] INTENSIVE CARE UNIT, I05/       Discharge Date       Discharge Disposition       Discharge Destination                               Attending Provider: Mic Herrera MD    Allergies: Amoxicillin, Penicillins    Isolation: None   Infection: None   Code Status: CPR    Ht: 162.6 cm (64\")   Wt: 73.4 kg (161 lb 13.1 oz)    Admission Cmt: None   Principal Problem: Septic shock [A41.9,R65.21]                 Active Insurance as of 2023     Primary Coverage     Payor Plan Insurance Group Employer/Plan Group    [employer] BY JAEGER [id number]     Payor Plan Address Payor Plan Phone Number Payor Plan Fax Number Effective Dates    [address] [address]   2021 - None Entered    [facility] [phone]       Subscriber Name Subscriber Birth Date Member ID       RENÉE LLANES 1965 [id number]                 Emergency Contacts      (Rel.) Home Phone Work Phone Mobile Phone    DALIA DILLON (Daughter) [phone] -- [phone]               History & Physical      Des Rocha DO at 23 0453           [facility]  [facility]   Patient Name: Renée Llanes  : 1965  MRN: [id number]  Primary Care Physician:  Vidal Luevano APRN  Date of admission: 2023    Subjective    Subjective     Chief Complaint: Altered mental status    HPI:    Renée Llanes is a 57 y.o. female brought into the emergency department for evaluation of altered mental status.  Daughter, who was at bedside, found the patient unconscious in her kitchen.  She was slumped over in her wheelchair upon arrival.  EMS was called, was transported to emergency department for evaluation.  Daughter reports that the patient had defecated on herself at the time of being found.  Reportedly, patient was recently diagnosed with throat cancer.  She has a history of lymphoma.  History of " mechanical valve not on anticoagulation.  Daughter reports that the patient has a left upper chest wall Mediport, and history of persistently positive blood cultures, requiring frequent IV antibiotic infusions.  She currently the process of getting the Mediport removed.  It is inaccessible.  Last known normal was the patient was yesterday, as a daughter spoke with her and visited her.  Daughter reports that they usually conference every evening via phone, and the patient did not call the daughter, daughter called the patient for approximately 1 hour without answer.  Patient's daughter went to perform a wellness check, found the patient unresponsive.      Personal History     Past Medical History:  • Anxiety       NO CURRENT MEDICATION   • Aortic stenosis, severe     • Arthritis     • Asthma     • Back pain     • Blood clotting disorder (HCC)     • Cancer associated pain     • Chronic low back pain with sciatica     • Diabetes mellitus (HCC)       TYPE 2   • Dyspnea on effort     • GERD (gastroesophageal reflux disease)     • H/O lumpectomy     • H/O: hysterectomy     • Hiatal hernia     • History of degenerative disc disease     • History of kidney stones     • History of pulmonary embolus (PE)     • History of transfusion     • Hodgkin's lymphoma (HCC)     • Immobility     • Lupus (HCC)     • Mitral valve prolapse     • Pelvic pain     • Peripheral neuropathy     • Personal history of DVT (deep vein thrombosis)     • Presence of intrathecal pump     • Sacroiliac joint disease     • SI (sacroiliac) joint inflammation (HCC)     • Venous insufficiency          Past Surgical History:  • BACK SURGERY         SPINAL FUSION    • BLADDER REPAIR       • CARDIAC CATHETERIZATION N/A 3/6/2019     Procedure: Valvuloplasty;  Surgeon: Wayne Johnson MD;  Location: [facility] INVASIVE LOCATION;  Service: Cardiology   • CARDIAC VALVE REPLACEMENT   2021   • CHOLECYSTECTOMY       • COLONOSCOPY N/A 12/29/2021     Procedure:  COLONOSCOPY;  Surgeon: Karine Orlando MD;  Location: [facility] ENDOSCOPY;  Service: Gastroenterology;  Laterality: N/A;  POOR PREP   • ENDOSCOPY N/A 12/29/2021     Procedure: ESOPHAGOGASTRODUODENOSCOPY;  Surgeon: Karine Orlando MD;  Location: [facility] ENDOSCOPY;  Service: Gastroenterology;  Laterality: N/A;  ESOPHAGITIS, GASTRITIS, HIATAL HERNIA   • HYSTERECTOMY       • LYMPHADENECTOMY       • PACEMAKER IMPLANTATION       • PAIN PUMP INSERTION/REVISION N/A 10/18/2019     Procedure: PAIN PUMP INSERTION [facility] 10-18-19 [place];  Surgeon: Horace Rios MD;  Location: [facility] OR;  Service: Pain Management   • PAIN PUMP INSERTION/REVISION N/A 11/23/2020     Procedure: pain pump removal;  Surgeon: Horace Rios MD;  Location: [facility] OR;  Service: Pain Management;  Laterality: N/A;   • TONSILLECTOMY       • TUBAL ABDOMINAL LIGATION       • TUMOR REMOVAL       Allergies: Penicillin, sulfa drugs    Family History   Problem Relation Age of Onset   • Pancreatic cancer Sister     • Pancreatic cancer Paternal Grandmother     • Malig Hyperthermia Neg Hx           Social History:   reports that she has been smoking cigarettes. She has a 82.00 pack-year smoking history. She has never used smokeless tobacco. She reports that she does not drink alcohol and does not use drugs.      Home Medications: Currently being about by nursing staff, will be reviewed when available.       Review of Systems   All systems were reviewed and negative except for: Altered mental status, somnolence    Objective    Objective     Vitals:   Temp:  [99.1 °F (37.3 °C)] 99.1 °F (37.3 °C)  Heart Rate:  [] 113  Resp:  [18-22] 22  BP: ()/(12-73) 83/50  Flow (L/min):  [2] 2    Physical Exam -limited secondary to altered mental status.   Constitutional: Somnolent, snoring, chronically ill-appearing.   Eyes: Pupils equal, sclerae anicteric, no conjunctival injection   HENT: NCA, mucous membranes dry   Neck: Supple, no  thyromegaly, no lymphadenopathy, trachea midline   Respiratory: Poor breath sounds all lung fields, distant lung sounds, clear otherwise.  Intercostal muscle use noted   Cardiovascular: RRR, 2+ systolic murmur, rubs, or gallops, palpable pedal pulses bilaterally   Gastrointestinal: Positive bowel sounds, soft, nontender, distended secondary to obesity   Musculoskeletal: No bilateral ankle edema, no clubbing or cyanosis to extremities   Psychiatric: Unable to be assessed secondary to altered mental status.   Neurologic: Unable to be assessed secondary to altered mental status.   Skin: No rashes, patient has chronic venous stasis dermatitis bilateral lower extremities.  Small, punctate, chronic appearing and healing wounds bilateral lower extremities.  No gross signs of infection.    Result Review    Result Review:  I have personally reviewed the results from the time of this admission to 5/26/2023 04:54 EDT and agree with these findings:  [x]  Laboratory  [x]  Microbiology  [x]  Radiology  [x]  EKG/Telemetry   [x]  Cardiology/Vascular   []  Pathology  [x]  Old records  []  Other:      Assessment & Plan   Assessment / Plan     Assessment/Plan:   • Septic shock-patient meets sepsis criteria with tachycardia, tachypnea, lactic acidosis of 9, procalcitonin too high to read source of infection presumably to be bacteremia.  Patient has a significant past medical history of persistently positive blood cultures, is required multiple IV antibiotic infusions.  Per the daughter at bedside, patient is in the midst of getting her left upper chest wall Mediport removed.  Continue broad-spectrum antibiotics, IV vancomycin, IV cefepime.  Blood cultures.  IV pressor support.  • NSTEMI, type I versus type II-patient has a chronic left bundle branch block, however, initial troponin is 300.  We will continue heparinization.  Obtain a transthoracic echo.  Patient does have a heart murmur, and combination with persistently positive blood  cultures, and a mechanical valve without anticoagulation, intracardiac thrombus needs to be ruled out.  • Possible cardiogenic shock-patient having persistently low blood pressures, presence of NSTEMI.  We will continue to monitor.  • Acute on chronic hypoxic respiratory failure-patient wears 2 L nasal cannula as needed at home, she is requiring constant oxygen supplementation fluctuated between 2 to 4 L in the ED.  Noted intercostal muscle use.  Supportive care.  Patient should be intubated if there is concerns for airway protection.  • Acute liver injury-cannot exclude shock liver as etiology.  Check salicylate, acetaminophen, acute hepatitis panel.  • Acute kidney injury-IV fluids.  If no improvement the next 24 to 48 hours, consider nephrology consultation.  Avoid nephrotoxic agents as appropriate  • Altered mental status-CT scan of the head without contrast initially negative for stroke.  Likely metabolic in origin given the above assessments.  Patient withdrawing from pain, moves all extremities and doing so.  She has chronic contractures of the bilateral lower extremities.  Less likely to be CVA.  If suspicion increases, proceed with MRI of the head.  We will continue to monitor.  • Uncontrolled type 2 diabetes mellitus with hyperglycemia-sliding scale insulin.  Weight-based dosing.  Target blood sugar less than 200.  • Hypokalemia-replete, recheck.  • Lactic acidosis-clinically significant.  Plan as above.  • Wheelchair-bound status  • Reported mechanical valve, not on anticoagulation.      DVT prophylaxis: Heparin drip    CODE STATUS: Full code       Admission Status:  I believe this patient meets inpatient status.    Electronically signed by Des Rocha DO, 05/26/23, 4:54 AM EDT.               Electronically signed by Des Rocha DO at 05/26/23 0520          Emergency Department Notes      Manuelito Lora MD at 05/26/23 0124          Time: 1:24 AM EDT  Date of encounter:  5/26/2023  Independent  "Historian/Clinical History and Information was obtained by:   Family and EMS  Chief Complaint: Diabetic issues    History is limited by: Altered Mental Status    History of Present Illness:  Patient is a 57 y.o. year old female who presents to the emergency department for evaluation of diabetic issues.    Patient could be having diabetic issues. Daughter found her on the floor unconscious, and she had soiled herself. EMS gave her a medication, and she woke up, told them she didn't need it as she was not overdosing.   We received the PE from EMS.     HPI    Patient Care Team  Primary Care Provider: Vidal Luevano APRN    Past Medical History:     Allergies   Allergen Reactions   • Amoxicillin Shortness Of Breath   • Penicillins Shortness Of Breath     No past medical history on file.  No past surgical history on file.  No family history on file.    Home Medications:  Prior to Admission medications    Not on File        Social History:          Review of Systems:  Review of Systems   Unable to perform ROS: Mental status change        Physical Exam:  BP 92/48   Pulse 108   Temp 98.8 °F (37.1 °C) (Oral)   Resp 22   Ht 162.6 cm (64\")   Wt 73.4 kg (161 lb 13.1 oz)   SpO2 (!) 89%   BMI 27.78 kg/m²     Physical Exam  Eyes:      Extraocular Movements:      Right eye: Normal extraocular motion.      Left eye: Normal extraocular motion.   Pulmonary:      Comments: Snoring respirating  Musculoskeletal:      Right lower leg: Edema (pedal) present.      Left lower leg: Edema (pedal) present.   Neurological:      Comments: Solemnent, awakens to painful stimuli                 Procedures:  Procedures      Medical Decision Making:      Comorbidities that affect care:    Diabetes    External Notes reviewed:    Hospital Discharge Summary: Admission for sepsis      The following orders were placed and all results were independently analyzed by me:  Orders Placed This Encounter   Procedures   • Blood Culture - Blood,   • Blood " Culture - Blood,   • Wound Culture - Swab, Leg, Left   • MRSA Screen, PCR (Inpatient) - Swab, Nares   • XR Chest 1 View   • CT Head Without Contrast   • [place] Draw   • Comprehensive Metabolic Panel   • Protime-INR   • Procalcitonin   • High Sensitivity Troponin T   • Lactic Acid, Plasma   • CBC Auto Differential   • ABG with Co-Ox and Electrolytes   • High Sensitivity Troponin T 2Hr   • STAT Lactic Acid, Reflex   • Urinalysis With Culture If Indicated - Straight Cath   • Urinalysis, Microscopic Only - Urine, Clean Catch   • aPTT   • aPTT   • aPTT   • Fibrinogen   • Magnesium   • Basic Metabolic Panel   • Salicylate Level   • Acetaminophen Level   • Hepatitis Panel, Acute   • STAT Lactic Acid, Reflex   • Comprehensive Metabolic Panel   • Magnesium   • Phosphorus   • Lactic Acid, Plasma   • Vancomycin, Random   • STAT Lactic Acid, Reflex   • NPO Diet NPO Type: Strict NPO   • Undress & Gown   • Measure Blood Pressure   • Vital Signs   • Straight cath   • Vital Signs   • Intake & Output   • Weigh Patient   • Oral Care   • Vital Signs Every 15 Minutes Until Stable, Then Every 4 Hours   • Telemetry - Place Orders & Notify Provider of Results When Patient Experiences Acute Chest Pain, Dysrhythmia or Respiratory Distress   • Notify Physician For Unrelieved Chest Pain   • RN To Release aPTT Order 6 Hours After Heparin Bolus & 6 Hours After Any Heparin Rate Change   • After 2 Consecutive Therapeutic aPTTs, Obtain aPTT Daily.  If a Rate Adjustment is Necessary, Resume Every 6 Hour aPTT Draws   • Telemetry - Maintain IV Access   • Telemetry - Place Orders & Notify Provider of Results When Patient Experiences Acute Chest Pain, Dysrhythmia or Respiratory Distress   • Stop Infusion & Notify Provider if Bleeding Occurs   • Notify Provider if PTT Not in Therapeutic Range After 24 Hours   • Notify Provider Platelet Count Less Than 63236   • Continuous Pulse Oximetry   • Code Status and Medical Interventions:   • Hospitalist  (on-call MD unless specified)   • Cardiac Rehab Evaluation and Enrollment   • Inpatient Cardiology Consult   • Inpatient Pulmonology Consult   • Oxygen Therapy- Nasal Cannula; Titrate 1-6 LPM Per SpO2; 90 - 95%   • NIPPV - Provider Settings   • POC Glucose Q6H   • POC Glucose Once   • ECG 12 Lead Other; Sepsis   • ECG 12 Lead Tachycardia   • Adult Transthoracic Echo Complete w/ Color, Spectral and Contrast if necessary per protocol   • Wound Ostomy Eval & Treat   • Insert Peripheral IV   • Insert 2nd Large Bore Peripheral IV   • Insert Peripheral IV   • Inpatient Admission   • CBC & Differential   • Green Top (Gel)   • Lavender Top   • Gold Top - SST   • Light Blue Top   • CBC & Differential   • CBC & Differential       Medications Given in the Emergency Department:  Medications   norepinephrine (LEVOPHED) 8 mg in 250 mL NS infusion (premix) (0.5 mcg/kg/min × 70.8 kg Intravenous New Bag 5/26/23 0632)   sodium chloride 0.9 % flush 10 mL (has no administration in time range)   sodium chloride 0.9 % flush 10 mL (has no administration in time range)   sodium chloride 0.9 % infusion 40 mL (has no administration in time range)   sennosides-docusate (PERICOLACE) 8.6-50 MG per tablet 2 tablet (has no administration in time range)     And   polyethylene glycol (MIRALAX) packet 17 g (has no administration in time range)     And   bisacodyl (DULCOLAX) EC tablet 5 mg (has no administration in time range)     And   bisacodyl (DULCOLAX) suppository 10 mg (has no administration in time range)   nitroglycerin (NITROSTAT) SL tablet 0.4 mg (has no administration in time range)   lactated ringers infusion (has no administration in time range)   aspirin suppository 300 mg (has no administration in time range)   clopidogrel (PLAVIX) tablet 75 mg (has no administration in time range)   atorvastatin (LIPITOR) tablet 80 mg (has no administration in time range)   cefepime (MAXIPIME) IVPB 2 g (premix) in D5 (has no administration in time  range)   Pharmacy to dose vancomycin (has no administration in time range)   EPINEPHrine 5 mg in 250 mL NS infusion (0.05 mcg/kg/min × 70.8 kg Intravenous New Bag 5/26/23 0697)   heparin 65885 units/250 mL (100 units/mL) in 0.9% NaCl infusion (has no administration in time range)   heparin bolus from bag 3,500 Units (has no administration in time range)   heparin bolus from bag 1,800 Units (has no administration in time range)   potassium chloride 10 mEq in 100 mL IVPB (has no administration in time range)   dextrose (GLUTOSE) oral gel 15 g (has no administration in time range)   dextrose (D50W) (25 g/50 mL) IV injection 25 g (has no administration in time range)   glucagon (GLUCAGEN) injection 1 mg (has no administration in time range)   insulin detemir (LEVEMIR) injection 30 Units (has no administration in time range)   insulin regular (humuLIN R,novoLIN R) injection 10 Units (has no administration in time range)   insulin regular (humuLIN R,novoLIN R) injection 2-7 Units (has no administration in time range)   sodium chloride 0.9 % bolus 1,000 mL (0 mL Intravenous Stopped 5/26/23 0240)   cefepime (MAXIPIME) IVPB 2 g (premix) in D5 (0 g Intravenous Stopped 5/26/23 0317)   sodium chloride 0.9 % bolus 1,000 mL (0 mL Intravenous Stopped 5/26/23 0240)   vancomycin 1500 mg/250 mL 0.9% NS IVPB ([facility]) (0 mg Intravenous Stopped 5/26/23 0426)   methylPREDNISolone sodium succinate (SOLU-Medrol) injection 125 mg (125 mg Intravenous Given 5/26/23 0443)        ED Course:    ED Course as of 05/26/23 0818   Fri May 26, 2023   0138 My interpretation of EKG: Sinus tachycardia 105 occasional PVC, left bundle branch block pattern, nonspecific ST change, no prior comparison available at this time. [JS]   0340 Sepsis criteria was met in the emergency department and the Sepsis protocol (including antibiotic administration) was initiated.      SIRS criteria considered:   1.  Temperature > 100.4 or <98.6    2.  Heart Rate > 90    3.   Respiratory Rate > 22    4.  WBC > 12K or <4K.             Severe Sepsis:     Respiratory: Mechanical Ventilation or BiPAP  Hypotension: SBP > 90 or MAP < 65  Renal: Creatinine > 2  Metabolic: Lactic Acid > 2  Hematologic: Platelets < 100K or INR > 1.5  Hepatic: BILI  >  2  CNS: Sudden AMS     Septic Shock:     Severe Sepsis + Persistent hypotension or Lactic Acid > 4     Normal saline bolus, Antibiotics, and final disposition was based on these definitions.        Sepsis was recognized at 0310    Antibiotics were ordered.       30 mL/kg bolus was indicated.         The patient was ordered 1L of fluids.    Total Critical Care time of 35 minutes. Total critical care time documented does not include time spent on separately billed procedures for services of nurses or physician assistants. I personally saw and examined the patient. I have reviewed all diagnostic interpretations and treatment plans as written. I was present for the key portions of any procedures performed and the inclusive time noted in any critical care statement. Critical care time includes patient management by me, time spent at the patient's bedside,  time to review lab and imaging results, discussing patient care, documentation in the medical record, and time spent with family or caregiver.   [JS]      ED Course User Index  [JS] Manuelito Lora MD       Labs:    Lab Results (last 24 hours)     Procedure Component Value Units Date/Time    CBC & Differential [[id number]]  (Abnormal) Collected: 05/26/23 0142    Specimen: Blood Updated: 05/26/23 0204    Narrative:      The following orders were created for panel order CBC & Differential.  Procedure                               Abnormality         Status                     ---------                               -----------         ------                     CBC Auto Differential[[id number]]        Abnormal            Final result               Scan Slide[[id number]]                                                                     Please view results for these tests on the individual orders.    Comprehensive Metabolic Panel [[id number]]  (Abnormal) Collected: 05/26/23 0142    Specimen: Blood Updated: 05/26/23 0223     Glucose 172 mg/dL      BUN 25 mg/dL      Creatinine 2.07 mg/dL      Sodium 137 mmol/L      Potassium 3.4 mmol/L      Comment: Slight hemolysis detected by analyzer. Results may be affected.        Chloride 96 mmol/L      CO2 19.0 mmol/L      Calcium 9.6 mg/dL      Total Protein 7.2 g/dL      Albumin 3.8 g/dL      ALT (SGPT) 99 U/L      AST (SGOT) 192 U/L      Comment: Slight hemolysis detected by analyzer. Results may be affected.        Alkaline Phosphatase 129 U/L      Total Bilirubin 1.4 mg/dL      Globulin 3.4 gm/dL      A/G Ratio 1.1 g/dL      BUN/Creatinine Ratio 12.1     Anion Gap 22.0 mmol/L      eGFR 27.5 mL/min/1.73     Narrative:      GFR Normal >60  Chronic Kidney Disease <60  Kidney Failure <15      Protime-INR [[id number]]  (Abnormal) Collected: 05/26/23 0142    Specimen: Blood Updated: 05/26/23 0159     Protime 17.1 Seconds      INR 1.40    Narrative:      Suggested Therapeutic Ranges For Oral Anticoagulant Therapy:  Level of Therapy                      INR Target Range  Standard Dose                            2.0-3.0  High Dose                                2.5-3.5  Patients not receiving anticoagulant  Therapy Normal Range                     0.86-1.15    Procalcitonin [[id number]]  (Abnormal) Collected: 05/26/23 0142    Specimen: Blood Updated: 05/26/23 0252     Procalcitonin >400.00 ng/mL     Narrative:      As a Marker for Sepsis (Non-Neonates):    1. <0.5 ng/mL represents a low risk of severe sepsis and/or septic shock.  2. >2 ng/mL represents a high risk of severe sepsis and/or septic shock.    As a Marker for Lower Respiratory Tract Infections that require antibiotic therapy:    PCT on Admission    Antibiotic Therapy       6-12 Hrs later    >0.5                Strongly  "Recommended  >0.25 - <0.5        Recommended  0.1 - 0.25          Discouraged              Remeasure/reassess PCT  <0.1                Strongly Discouraged     Remeasure/reassess PCT    As 28 day mortality risk marker: \"Change in Procalcitonin Result\" (>80% or <=80%) if Day 0 (or Day 1) and Day 4 values are available. Refer to http://www.[facility]-pct-calculator.com    Change in PCT <=80%  A decrease of PCT levels below or equal to 80% defines a positive change in PCT test result representing a higher risk for 28-day all-cause mortality of patients diagnosed with severe sepsis for septic shock.    Change in PCT >80%  A decrease of PCT levels of more than 80% defines a negative change in PCT result representing a lower risk for 28-day all-cause mortality of patients diagnosed with severe sepsis or septic shock.    This test is Prognostic not Diagnostic, if elevated correlate with clinical findings before administering antibiotic treatment.        High Sensitivity Troponin T [[id number]]  (Abnormal) Collected: 05/26/23 0142    Specimen: Blood Updated: 05/26/23 0223     HS Troponin T 303 ng/L     Narrative:      High Sensitive Troponin T Reference Range:  <10.0 ng/L- Negative Female for AMI  <15.0 ng/L- Negative Male for AMI  >=10 - Abnormal Female indicating possible myocardial injury.  >=15 - Abnormal Male indicating possible myocardial injury.   Clinicians would have to utilize clinical acumen, EKG, Troponin, and serial changes to determine if it is an Acute Myocardial Infarction or myocardial injury due to an underlying chronic condition.         Blood Culture - Blood, Arm, Left [[id number]] Collected: 05/26/23 0142    Specimen: Blood from Arm, Left Updated: 05/26/23 0147    Blood Culture - Blood, Arm, Left [[id number]] Collected: 05/26/23 0142    Specimen: Blood from Arm, Left Updated: 05/26/23 0148    Lactic Acid, Plasma [[id number]]  (Abnormal) Collected: 05/26/23 0142    Specimen: Blood Updated: 05/26/23 0223     " Lactate 9.6 mmol/L     CBC Auto Differential [[id number]]  (Abnormal) Collected: 05/26/23 0142    Specimen: Blood Updated: 05/26/23 0204     WBC 10.29 10*3/mm3      RBC 5.46 10*6/mm3      Hemoglobin 15.1 g/dL      Hematocrit 46.4 %      MCV 85.0 fL      MCH 27.7 pg      MCHC 32.5 g/dL      RDW 14.6 %      RDW-SD 44.4 fl      MPV 10.9 fL      Platelets 95 10*3/mm3      Neutrophil % 90.7 %      Lymphocyte % 6.5 %      Monocyte % 1.0 %      Eosinophil % 0.4 %      Basophil % 0.3 %      Immature Grans % 1.1 %      Neutrophils, Absolute 9.34 10*3/mm3      Lymphocytes, Absolute 0.67 10*3/mm3      Monocytes, Absolute 0.10 10*3/mm3      Eosinophils, Absolute 0.04 10*3/mm3      Basophils, Absolute 0.03 10*3/mm3      Immature Grans, Absolute 0.11 10*3/mm3      nRBC 0.4 /100 WBC     ABG with Co-Ox and Electrolytes [[id number]]  (Abnormal) Collected: 05/26/23 0142    Specimen: Arterial Blood from Arm, Left Updated: 05/26/23 0145     pH, Arterial 7.429 pH units      pCO2, Arterial 29.8 mm Hg      pO2, Arterial 78.0 mm Hg      HCO3, Arterial 19.3 mmol/L      Base Excess, Arterial -3.7 mmol/L      O2 Saturation, Arterial 95.3 %      Hemoglobin, Blood Gas 15.2 g/dL      Carboxyhemoglobin 2.0 %      Methemoglobin 0.20 %      Oxyhemoglobin 93.2 %      FHHB 4.6 %      Shane's Test Positive     Note 2l     Site Arterial: left radial     Modality Cannula - Nasal     FIO2 28 %      Flow Rate 2 lpm      Sodium, Arterial 134.6 mmol/L      Potassium, Arterial 3.22 mmol/L      Ionized Calcium, Arterial 1.11 mmol/L      Chloride, Arterial 100 mmol/L      Glucose, Arterial 181 mg/dL      Lactate, Arterial 6.56 mmol/L      PO2/FIO2 279    Urinalysis With Culture If Indicated - Straight Cath [[id number]]  (Abnormal) Collected: 05/26/23 0225    Specimen: Urine from Straight Cath Updated: 05/26/23 0301     Color, UA Yellow     Appearance, UA Clear     pH, UA 6.5     Specific Gravity, UA 1.013     Glucose, UA Negative     Ketones, UA Negative      Bilirubin, UA Negative     Blood, UA Moderate (2+)     Protein,  mg/dL (2+)     Leuk Esterase, UA Negative     Nitrite, UA Negative     Urobilinogen, UA 0.2 E.U./dL    Narrative:      In absence of clinical symptoms, the presence of pyuria, bacteria, and/or nitrites on the urinalysis result does not correlate with infection.    Urinalysis, Microscopic Only - Straight Cath [[id number]]  (Abnormal) Collected: 05/26/23 0225    Specimen: Urine from Straight Cath Updated: 05/26/23 0331     RBC, UA 0-2 /HPF      WBC, UA 0-2 /HPF      Comment: Urine culture not indicated.        Bacteria, UA None Seen /HPF      Squamous Epithelial Cells, UA 3-6 /HPF      Hyaline Casts, UA 0-2 /LPF      Methodology Automated Microscopy    Wound Culture - Swab, Leg, Right [[id number]] Collected: 05/26/23 0305    Specimen: Swab from Leg, Right Updated: 05/26/23 0308    High Sensitivity Troponin T 2Hr [[id number]]  (Abnormal) Collected: 05/26/23 0338    Specimen: Blood from Arm, Left Updated: 05/26/23 0440     HS Troponin T 256 ng/L      Troponin T Delta -47 ng/L     Narrative:      High Sensitive Troponin T Reference Range:  <10.0 ng/L- Negative Female for AMI  <15.0 ng/L- Negative Male for AMI  >=10 - Abnormal Female indicating possible myocardial injury.  >=15 - Abnormal Male indicating possible myocardial injury.   Clinicians would have to utilize clinical acumen, EKG, Troponin, and serial changes to determine if it is an Acute Myocardial Infarction or myocardial injury due to an underlying chronic condition.         Salicylate Level [[id number]]  (Normal) Collected: 05/26/23 0338    Specimen: Blood from Arm, Left Updated: 05/26/23 0641     Salicylate <0.3 mg/dL     Acetaminophen Level [[id number]]  (Normal) Collected: 05/26/23 0338    Specimen: Blood from Arm, Left Updated: 05/26/23 0641     Acetaminophen <5.0 mcg/mL     STAT Lactic Acid, Reflex [[id number]]  (Abnormal) Collected: 05/26/23 0455    Specimen: Blood from Arm, Left Updated:  05/26/23 0625     Lactate 4.9 mmol/L     STAT Lactic Acid, Reflex [[id number]]  (Abnormal) Collected: 05/26/23 0708    Specimen: Blood Updated: 05/26/23 0742     Lactate 3.1 mmol/L     aPTT [[id number]] Collected: 05/26/23 0708    Specimen: Blood Updated: 05/26/23 0714    Fibrinogen [[id number]] Collected: 05/26/23 0708    Specimen: Blood Updated: 05/26/23 0714    Magnesium [[id number]]  (Abnormal) Collected: 05/26/23 0708    Specimen: Blood Updated: 05/26/23 0736     Magnesium 1.4 mg/dL     Basic Metabolic Panel [[id number]]  (Abnormal) Collected: 05/26/23 0708    Specimen: Blood Updated: 05/26/23 0736     Glucose 168 mg/dL      BUN 30 mg/dL      Creatinine 1.32 mg/dL      Sodium 136 mmol/L      Potassium 3.5 mmol/L      Chloride 103 mmol/L      CO2 17.9 mmol/L      Calcium 8.0 mg/dL      BUN/Creatinine Ratio 22.7     Anion Gap 15.1 mmol/L      eGFR 47.2 mL/min/1.73     Narrative:      GFR Normal >60  Chronic Kidney Disease <60  Kidney Failure <15      POC Glucose Once [[id number]]  (Abnormal) Collected: 05/26/23 0808    Specimen: Blood Updated: 05/26/23 0810     Glucose 152 mg/dL      Comment: Serial Number: [id number]:  [id number]              Imaging:    CT Head Without Contrast    Result Date: 5/26/2023  PROCEDURE: CT HEAD WO CONTRAST  COMPARISONS: 5/4/2023; 1/19/2018.  INDICATIONS: 57-year-old female w/ h/o mental status change, unknown cause; the patient was found on the floor; h/o lymphoma, not otherwise specified.  PROTOCOL:   Standard CT imaging protocol performed.    RADIATION:   Total DLP: 2,224.2 mGy*cm.   MA and/or KV were/was adjusted to minimize radiation dose.    TECHNIQUE: After obtaining the patient's consent, 282 CT images were obtained without non-ionic intravenous contrast material.  There is slight motion artifact on the study.  The best possible images were obtained.  DISCUSSION:   A routine nonenhanced head CT was performed.  No acute brain abnormality is identified.  No acute intracranial  hemorrhage.  No acute infarct is seen.  No acute skull fracture.  No midline shift or acute intracranial mass effect is seen.  The ventricular size and configuration are within normal limits.  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 Mild age-indeterminate mucosal thickening involves the imaged paranasal sinuses.  No air-fluid interfaces are seen within the imaged paranasal sinuses.  There is a prominent torus palatinus which is partially imaged.       No acute brain abnormality is seen. Specifically, no acute intracranial hemorrhage, no acute infarct, no significant intracranial mass lesion, and no acute intracranial mass effect are appreciated.   The study is motion-limited.    Please note that portions of this note were completed with a voice recognition program.  JEN JONES JR, MD       Electronically Signed and Approved By: JEN JONES JR, MD on 5/26/2023 at 2:22              XR Chest 1 View    Result Date: 5/26/2023  PROCEDURE: XR CHEST 1 VW  COMPARISONS: 5/4/2023; 11/17/2022.  INDICATIONS: Possible sepsis; h/o lymphoma.  FINDINGS:  A single AP (or PA) upright portable chest radiograph was performed.  No cardiac enlargement is seen.  No acute infiltrate is appreciated.  No pleural effusion or pneumothorax is identified.  There is a left IJ central venous line in place with its distal tip in the expected lower superior vena cava (SVC).  The patient has undergone transcatheter aortic valve replacement (TAVR), seen previously.  The thoracic aorta is atherosclerotic.  There is emphysematous contour of the lungs.  Chronic calcified granulomatous disease involves the chest also.  There is pulmonary hypoinflation.  External artifacts obscure detail.  No significant interval change is seen since the prior study (or studies).        No acute infiltrate is appreciated.    Please note that portions of this note were completed with a voice recognition program.   JEN JONES JR, MD       Electronically Signed and Approved By: JEN JONES JR, MD on 5/26/2023 at 2:27                  Differential Diagnosis and Discussion:    Altered Mental Status: Based on the patient's signs and symptoms, differential diagnosis includes but is not limited to meningitis, stroke, sepsis, subarachnoid hemorrhage, intracranial bleeding, encephalitis, and metabolic encephalopathy.    All labs were reviewed and interpreted by me.  All X-rays impressions were independently interpreted by me.  EKG was interpreted by me.    [facility]     Critical Care Note: Total Critical Care time of 35 minutes. Total critical care time documented does not include time spent on separately billed procedures for services of nurses or physician assistants. I personally saw and examined the patient. I have reviewed all diagnostic interpretations and treatment plans as written. I was present for the key portions of any procedures performed and the inclusive time noted in any critical care statement. Critical care time includes patient management by me, time spent at the patients bedside,  time to review lab and imaging results, discussing patient care, documentation in the medical record, and time spent with family or caregiver.    Patient Care Considerations:    CT ABDOMEN AND PELVIS: I considered ordering a CT scan of the abdomen and pelvis however No abdominal tenderness      Consultants/Shared Management Plan:    Hospitalist: I have discussed the case with The hospitalist who agrees to accept the patient for admission.    Social Determinants of Health:          Disposition and Care Coordination:    Admit:   Through independent evaluation of the patient's history, physical, and imperical data, the patient meets criteria for observation/admission to the hospital.        Final diagnoses:   Septic shock   Lactic acidosis   Elevated troponin   Acute renal failure, unspecified acute renal failure type   Acute metabolic  encephalopathy        ED Disposition     ED Disposition   Decision to Admit    Condition   --    Comment   Level of Care: Critical Care [6]   Diagnosis: Septic shock [[id number]]   Admitting Physician: LENNOX JARRELL [[id number]]   Attending Physician: LENNOX JARRELL [[id number]]   Certification: I Certify That Inpatient Hospital Services Are Medically Necessary For Greater Than 2 Midnights               This medical record created using voice recognition software.        Documentation assistance provided by Joie Jaramillo acting as scribe for Manuelito Lora MD. Information recorded by the scribe was done at my direction and has been verified and validated by me.          Joie Jaramillo  05/26/23 0130       Manuelito Lora MD  05/26/23 0818      Electronically signed by Manuelito Lora MD at 05/26/23 0818       Vital Signs (last day)     Date/Time Temp Temp src Pulse Resp BP Patient Position SpO2    05/26/23 1158 99.7 (37.6) Oral -- -- -- -- --    05/26/23 0803 98.8 (37.1) Oral -- -- -- -- --    05/26/23 0530 -- -- 108 -- 92/48 -- 89    05/26/23 0525 -- -- 118 -- 94/64 -- 88    05/26/23 0520 -- -- 109 -- 87/51 -- 88    05/26/23 0515 -- -- 109 -- 56/43 -- 89    05/26/23 0510 -- -- 108 -- 75/51 -- 89    05/26/23 0505 -- -- 110 -- 69/52 -- 89    05/26/23 0500 -- -- 105 -- 72/49 -- 90    05/26/23 0456 -- -- 115 -- 82/70 -- 90    05/26/23 0450 -- -- 118 -- 65/37 -- --    05/26/23 0445 -- -- 113 -- 83/50 -- 89    05/26/23 0425 -- -- 119 -- 104/73 -- 91    05/26/23 0415 -- -- 109 -- 77/50 -- --    05/26/23 0410 -- -- 109 -- 79/45 -- --    05/26/23 0405 -- -- 108 -- 77/50 -- --    05/26/23 0345 -- -- 109 -- 80/53 -- 91    05/26/23 0340 -- -- 109 -- 79/50 -- 91    05/26/23 0335 -- -- 107 -- 77/52 -- 92    05/26/23 0330 -- -- 106 -- 76/53 -- 90    05/26/23 0316 -- -- 104 -- 75/53 -- --    05/26/23 0315 -- -- 104 22 75/53 -- 94    05/26/23 0300 -- -- 96 22 75/58 -- 93    05/26/23 0255 -- -- 102 -- 69/45 -- 95    05/26/23 0250 -- -- 104 --  52/12 -- 92 05/26/23 0235 -- -- 103 -- 64/50 -- 92    05/26/23 0230 -- -- 100 22 63/40 -- 94    05/26/23 0225 -- -- 101 -- 63/38 -- --    05/26/23 0200 -- -- 96 18 65/44 -- 92    05/26/23 0121 99.1 (37.3) Oral 98 20 85/50 -- 92          Oxygen Therapy (last day)     Date/Time SpO2 Device (Oxygen Therapy) Flow (L/min) Oxygen Concentration (%) ETCO2 (mmHg)    05/26/23 0530 89 -- -- -- 30 05/26/23 0525 88 nasal cannula 2 -- 29 05/26/23 0520 88 -- -- -- 30 05/26/23 0515 89 -- -- -- 27 05/26/23 0510 89 -- -- -- 28 05/26/23 0505 89 -- -- -- 27 05/26/23 0500 90 -- -- -- 27 05/26/23 0456 90 -- -- -- 30 05/26/23 0450 -- -- -- -- 26 05/26/23 0445 89 nasal cannula 2 -- 27 05/26/23 0425 91 nasal cannula 2 -- 30 05/26/23 0415 -- nasal cannula 2 -- 28 05/26/23 0410 -- -- -- -- 24 05/26/23 0405 -- -- -- -- 26 05/26/23 0345 91 nasal cannula 2 -- 25 05/26/23 0340 91 -- -- -- 27 05/26/23 0335 92 -- -- -- 26 05/26/23 0330 90 nasal cannula 2 -- 28 05/26/23 0315 94 nasal cannula 2 -- 26 05/26/23 0300 93 nasal cannula 2 -- 24 05/26/23 0255 95 -- -- -- 26 05/26/23 0250 92 -- -- -- 23 05/26/23 0235 92 -- -- -- 22 05/26/23 0230 94 nasal cannula 2 -- 22 05/26/23 0200 92 nasal cannula 2 -- 23 05/26/23 0121 92 room air -- -- --            Facility-Administered Medications as of 5/26/2023   Medication Dose Route Frequency Provider Last Rate Last Admin   • atorvastatin (LIPITOR) tablet 80 mg  80 mg Oral Nightly Des Rocha DO       • sennosides-docusate (PERICOLACE) 8.6-50 MG per tablet 2 tablet  2 tablet Oral BID Des Rocha DO        And   • polyethylene glycol (MIRALAX) packet 17 g  17 g Oral Daily PRN Des Rocha DO        And   • bisacodyl (DULCOLAX) EC tablet 5 mg  5 mg Oral Daily PRN Des Rocha DO        And   • bisacodyl (DULCOLAX) suppository 10 mg  10 mg Rectal Daily PRN Des Rocha DO       • [COMPLETED] cefepime (MAXIPIME) IVPB 2 g (premix)  in D5  2 g Intravenous Once Manuelito Lora MD   Stopped at 23 0317   • [START ON 2023] cefepime (MAXIPIME) IVPB 2 g (premix) in D5  2 g Intravenous Q24H Des Rocha DO       • clopidogrel (PLAVIX) tablet 75 mg  75 mg Oral Daily Des Rocha DO       • dextrose (D50W) (25 g/50 mL) IV injection 25 g  25 g Intravenous Q15 Min PRN Des Rocha DO       • dextrose (GLUTOSE) oral gel 15 g  15 g Oral Q15 Min PRN Des Rocha DO       • glucagon (GLUCAGEN) injection 1 mg  1 mg Intramuscular Q15 Min PRN Des Rocha DO       • insulin detemir (LEVEMIR) injection 15 Units  15 Units Subcutaneous Nightly Mira Andujar APRN       • insulin regular (humuLIN R,novoLIN R) injection 2-7 Units  2-7 Units Subcutaneous Q6H Des Rocha DO   2 Units at 23 0859   • [COMPLETED] lactated ringers bolus 3,000 mL  3,000 mL Intravenous Once Mira Andujar APRN 1,500 mL/hr at 23 0857 3,000 mL at 23 0857   • lactated ringers infusion  125 mL/hr Intravenous Continuous Des Rocha DO       • magnesium sulfate 4g/100mL (PREMIX) infusion  4 g Intravenous Once Mira Andujar APRN 25 mL/hr at 23 0858 4 g at 23 0858   • [COMPLETED] methylPREDNISolone sodium succinate (SOLU-Medrol) injection 125 mg  125 mg Intravenous Once Manuelito Lora MD   125 mg at 23 0443   • nitroglycerin (NITROSTAT) SL tablet 0.4 mg  0.4 mg Sublingual Q5 Min PRN Des Rocha DO       • norepinephrine (LEVOPHED) 8 mg in 250 mL NS infusion (premix)  0.02-0.5 mcg/kg/min Intravenous Titrated Des Rocha DO 66.4 mL/hr at 23 1010 0.5 mcg/kg/min at 23 1010   • Patient Supplied Pain Pump   Intrathecal Continuous Mic Herrera MD       • Pharmacy to dose vancomycin   Does not apply Continuous PRN Des Rocha DO       • [] potassium chloride 10 mEq in 100 mL IVPB  10 mEq Intravenous Q1H Des Rocha  mL/hr at 23 1012 10 mEq at 23 1012   • [COMPLETED] sodium  chloride 0.9 % bolus 1,000 mL  1,000 mL Intravenous Once Manuelito Lora MD   Stopped at 05/26/23 0240   • [COMPLETED] sodium chloride 0.9 % bolus 1,000 mL  1,000 mL Intravenous Once Manuelito Lora MD   Stopped at 05/26/23 0240   • sodium chloride 0.9 % flush 10 mL  10 mL Intravenous Q12H Des Rocha DO   10 mL at 05/26/23 0901   • sodium chloride 0.9 % flush 10 mL  10 mL Intravenous PRN Des Rocha DO       • sodium chloride 0.9 % infusion 40 mL  40 mL Intravenous PRN Des Rocha DO       • [COMPLETED] vancomycin 1500 mg/250 mL 0.9% NS IVPB ([facility])  20 mg/kg Intravenous Once Manuelito Lora MD   Stopped at 05/26/23 0426   • Vasopressin (VASOSTRICT) 0.2 UNIT/ML solution  0.04 Units/min Intravenous Continuous Mira Andujar APRN 12 mL/hr at 05/26/23 0900 0.04 Units/min at 05/26/23 0900       Lab Results (last 24 hours)     Procedure Component Value Units Date/Time    CK-MB [[id number]]  (Abnormal) Collected: 05/26/23 1200    Specimen: Blood Updated: 05/26/23 1226     CKMB 115.10 ng/mL     Narrative:      Results may be falsely decreased if patient taking Biotin.      Legionella Antigen, Urine - Urine, Straight Cath [[id number]]  (Normal) Collected: 05/26/23 0225    Specimen: Urine from Straight Cath Updated: 05/26/23 1208     LEGIONELLA ANTIGEN, URINE Negative    S. Pneumo Ag Urine or CSF - Urine, Straight Cath [[id number]]  (Normal) Collected: 05/26/23 0225    Specimen: Urine from Straight Cath Updated: 05/26/23 1208     Strep Pneumo Ag Negative    Hepatitis Panel, Acute [[id number]] Collected: 05/26/23 1200    Specimen: Blood Updated: 05/26/23 1203    Vancomycin, Random [[id number]] Collected: 05/26/23 1200    Specimen: Blood Updated: 05/26/23 1203    Lipase [[id number]] Collected: 05/26/23 1200    Specimen: Blood Updated: 05/26/23 1203    STAT Lactic Acid, Reflex [[id number]] Collected: 05/26/23 1200    Specimen: Blood Updated: 05/26/23 1203    Urine Drug Screen - Straight Cath [[id number]]  (Abnormal)  Collected: 05/26/23 0225    Specimen: Urine from Straight Cath Updated: 05/26/23 1159     Amphet/Methamphet, Screen Negative     Barbiturates Screen, Urine Negative     Benzodiazepine Screen, Urine Negative     Cocaine Screen, Urine Negative     Opiate Screen Positive     THC, Screen, Urine Negative     Methadone Screen, Urine Negative     Oxycodone Screen, Urine Negative     Fentanyl, Urine Negative    Narrative:      Negative Thresholds Per Drugs Screened:    Amphetamines                 500 ng/ml  Barbiturates                 200 ng/ml  Benzodiazepines              100 ng/ml  Cocaine                      300 ng/ml  Methadone                    300 ng/ml  Opiates                      300 ng/ml  Oxycodone                    100 ng/ml  THC                           50 ng/ml  Fentanyl                       5 ng/ml      The Normal Value for all drugs tested is negative. This report includes final unconfirmed screening results to be used for medical treatment purposes only. Unconfirmed results must not be used for non-medical purposes such as employment or legal testing. Clinical consideration should be applied to any drug of abuse test, particularly when unconfirmed results are used.            POC Glucose Once [[id number]]  (Abnormal) Collected: 05/26/23 1152    Specimen: Blood Updated: 05/26/23 1156     Glucose 138 mg/dL      Comment: Serial Number: [id number]:  [id number]       Thyroid Panel With TSH [[id number]] Collected: 05/26/23 0708    Specimen: Blood Updated: 05/26/23 1148    Wound Culture - Swab, Leg, Right [[id number]] Collected: 05/26/23 0305    Specimen: Swab from Leg, Right Updated: 05/26/23 1031     Gram Stain Many (4+) WBCs seen      Few (2+) Gram positive cocci in pairs    aPTT [[id number]]  (Abnormal) Collected: 05/26/23 0708    Specimen: Blood Updated: 05/26/23 0822     PTT 37.3 seconds     Fibrinogen [[id number]]  (Normal) Collected: 05/26/23 0708    Specimen: Blood Updated: 05/26/23 0822      Fibrinogen 230 mg/dL     POC Glucose Once [[id number]]  (Abnormal) Collected: 05/26/23 0808    Specimen: Blood Updated: 05/26/23 0810     Glucose 152 mg/dL      Comment: Serial Number: [id number]:  [id number]       STAT Lactic Acid, Reflex [[id number]]  (Abnormal) Collected: 05/26/23 0708    Specimen: Blood Updated: 05/26/23 0742     Lactate 3.1 mmol/L     Magnesium [[id number]]  (Abnormal) Collected: 05/26/23 0708    Specimen: Blood Updated: 05/26/23 0736     Magnesium 1.4 mg/dL     Basic Metabolic Panel [[id number]]  (Abnormal) Collected: 05/26/23 0708    Specimen: Blood Updated: 05/26/23 0736     Glucose 168 mg/dL      BUN 30 mg/dL      Creatinine 1.32 mg/dL      Sodium 136 mmol/L      Potassium 3.5 mmol/L      Chloride 103 mmol/L      CO2 17.9 mmol/L      Calcium 8.0 mg/dL      BUN/Creatinine Ratio 22.7     Anion Gap 15.1 mmol/L      eGFR 47.2 mL/min/1.73     Narrative:      GFR Normal >60  Chronic Kidney Disease <60  Kidney Failure <15      Acetaminophen Level [[id number]]  (Normal) Collected: 05/26/23 0338    Specimen: Blood from Arm, Left Updated: 05/26/23 0641     Acetaminophen <5.0 mcg/mL     Salicylate Level [[id number]]  (Normal) Collected: 05/26/23 0338    Specimen: Blood from Arm, Left Updated: 05/26/23 0641     Salicylate <0.3 mg/dL     STAT Lactic Acid, Reflex [[id number]]  (Abnormal) Collected: 05/26/23 0455    Specimen: Blood from Arm, Left Updated: 05/26/23 0625     Lactate 4.9 mmol/L     High Sensitivity Troponin T 2Hr [[id number]]  (Abnormal) Collected: 05/26/23 0338    Specimen: Blood from Arm, Left Updated: 05/26/23 0440     HS Troponin T 256 ng/L      Troponin T Delta -47 ng/L     Narrative:      High Sensitive Troponin T Reference Range:  <10.0 ng/L- Negative Female for AMI  <15.0 ng/L- Negative Male for AMI  >=10 - Abnormal Female indicating possible myocardial injury.  >=15 - Abnormal Male indicating possible myocardial injury.   Clinicians would have to utilize clinical acumen, EKG,  "Troponin, and serial changes to determine if it is an Acute Myocardial Infarction or myocardial injury due to an underlying chronic condition.         Urinalysis, Microscopic Only - Straight Cath [[id number]]  (Abnormal) Collected: 05/26/23 0225    Specimen: Urine from Straight Cath Updated: 05/26/23 0331     RBC, UA 0-2 /HPF      WBC, UA 0-2 /HPF      Comment: Urine culture not indicated.        Bacteria, UA None Seen /HPF      Squamous Epithelial Cells, UA 3-6 /HPF      Hyaline Casts, UA 0-2 /LPF      Methodology Automated Microscopy    Urinalysis With Culture If Indicated - Straight Cath [[id number]]  (Abnormal) Collected: 05/26/23 0225    Specimen: Urine from Straight Cath Updated: 05/26/23 0301     Color, UA Yellow     Appearance, UA Clear     pH, UA 6.5     Specific Gravity, UA 1.013     Glucose, UA Negative     Ketones, UA Negative     Bilirubin, UA Negative     Blood, UA Moderate (2+)     Protein,  mg/dL (2+)     Leuk Esterase, UA Negative     Nitrite, UA Negative     Urobilinogen, UA 0.2 E.U./dL    Narrative:      In absence of clinical symptoms, the presence of pyuria, bacteria, and/or nitrites on the urinalysis result does not correlate with infection.    Procalcitonin [[id number]]  (Abnormal) Collected: 05/26/23 0142    Specimen: Blood Updated: 05/26/23 0252     Procalcitonin >400.00 ng/mL     Narrative:      As a Marker for Sepsis (Non-Neonates):    1. <0.5 ng/mL represents a low risk of severe sepsis and/or septic shock.  2. >2 ng/mL represents a high risk of severe sepsis and/or septic shock.    As a Marker for Lower Respiratory Tract Infections that require antibiotic therapy:    PCT on Admission    Antibiotic Therapy       6-12 Hrs later    >0.5                Strongly Recommended  >0.25 - <0.5        Recommended  0.1 - 0.25          Discouraged              Remeasure/reassess PCT  <0.1                Strongly Discouraged     Remeasure/reassess PCT    As 28 day mortality risk marker: \"Change in " "Procalcitonin Result\" (>80% or <=80%) if Day 0 (or Day 1) and Day 4 values are available. Refer to http://www.[facility]-pct-calculator.com    Change in PCT <=80%  A decrease of PCT levels below or equal to 80% defines a positive change in PCT test result representing a higher risk for 28-day all-cause mortality of patients diagnosed with severe sepsis for septic shock.    Change in PCT >80%  A decrease of PCT levels of more than 80% defines a negative change in PCT result representing a lower risk for 28-day all-cause mortality of patients diagnosed with severe sepsis or septic shock.    This test is Prognostic not Diagnostic, if elevated correlate with clinical findings before administering antibiotic treatment.        [place] Draw [[id number]] Collected: 05/26/23 0142    Specimen: Blood Updated: 05/26/23 0246    Narrative:      The following orders were created for panel order [place] Draw.  Procedure                               Abnormality         Status                     ---------                               -----------         ------                     Green Top (Gel)[[id number]]                                  Final result               Lavender Top[[id number]]                                     Final result               Gold Top - SST[[id number]]                                   Final result               Light Blue Top[[id number]]                                   Final result                 Please view results for these tests on the individual orders.    Green Top (Gel) [[id number]] Collected: 05/26/23 0142    Specimen: Blood Updated: 05/26/23 0246     Extra Tube Hold for add-ons.     Comment: Auto resulted.       Light Blue Top [[id number]] Collected: 05/26/23 0142    Specimen: Blood Updated: 05/26/23 0246     Extra Tube Hold for add-ons.     Comment: Auto resulted       Lavender Top [[id number]] Collected: 05/26/23 0142    Specimen: Blood Updated: 05/26/23 0246     Extra Tube hold for add-on     Comment: " Auto resulted       Gold Top - SST [[id number]] Collected: 05/26/23 0142    Specimen: Blood Updated: 05/26/23 0246     Extra Tube Hold for add-ons.     Comment: Auto resulted.       Comprehensive Metabolic Panel [[id number]]  (Abnormal) Collected: 05/26/23 0142    Specimen: Blood Updated: 05/26/23 0223     Glucose 172 mg/dL      BUN 25 mg/dL      Creatinine 2.07 mg/dL      Sodium 137 mmol/L      Potassium 3.4 mmol/L      Comment: Slight hemolysis detected by analyzer. Results may be affected.        Chloride 96 mmol/L      CO2 19.0 mmol/L      Calcium 9.6 mg/dL      Total Protein 7.2 g/dL      Albumin 3.8 g/dL      ALT (SGPT) 99 U/L      AST (SGOT) 192 U/L      Comment: Slight hemolysis detected by analyzer. Results may be affected.        Alkaline Phosphatase 129 U/L      Total Bilirubin 1.4 mg/dL      Globulin 3.4 gm/dL      A/G Ratio 1.1 g/dL      BUN/Creatinine Ratio 12.1     Anion Gap 22.0 mmol/L      eGFR 27.5 mL/min/1.73     Narrative:      GFR Normal >60  Chronic Kidney Disease <60  Kidney Failure <15      Lactic Acid, Plasma [[id number]]  (Abnormal) Collected: 05/26/23 0142    Specimen: Blood Updated: 05/26/23 0223     Lactate 9.6 mmol/L     High Sensitivity Troponin T [[id number]]  (Abnormal) Collected: 05/26/23 0142    Specimen: Blood Updated: 05/26/23 0223     HS Troponin T 303 ng/L     Narrative:      High Sensitive Troponin T Reference Range:  <10.0 ng/L- Negative Female for AMI  <15.0 ng/L- Negative Male for AMI  >=10 - Abnormal Female indicating possible myocardial injury.  >=15 - Abnormal Male indicating possible myocardial injury.   Clinicians would have to utilize clinical acumen, EKG, Troponin, and serial changes to determine if it is an Acute Myocardial Infarction or myocardial injury due to an underlying chronic condition.         CBC & Differential [[id number]]  (Abnormal) Collected: 05/26/23 0142    Specimen: Blood Updated: 05/26/23 0204    Narrative:      The following orders were created for  panel order CBC & Differential.  Procedure                               Abnormality         Status                     ---------                               -----------         ------                     CBC Auto Differential[[id number]]        Abnormal            Final result               Scan Slide[[id number]]                                                                    Please view results for these tests on the individual orders.    CBC Auto Differential [[id number]]  (Abnormal) Collected: 05/26/23 0142    Specimen: Blood Updated: 05/26/23 0204     WBC 10.29 10*3/mm3      RBC 5.46 10*6/mm3      Hemoglobin 15.1 g/dL      Hematocrit 46.4 %      MCV 85.0 fL      MCH 27.7 pg      MCHC 32.5 g/dL      RDW 14.6 %      RDW-SD 44.4 fl      MPV 10.9 fL      Platelets 95 10*3/mm3      Neutrophil % 90.7 %      Lymphocyte % 6.5 %      Monocyte % 1.0 %      Eosinophil % 0.4 %      Basophil % 0.3 %      Immature Grans % 1.1 %      Neutrophils, Absolute 9.34 10*3/mm3      Lymphocytes, Absolute 0.67 10*3/mm3      Monocytes, Absolute 0.10 10*3/mm3      Eosinophils, Absolute 0.04 10*3/mm3      Basophils, Absolute 0.03 10*3/mm3      Immature Grans, Absolute 0.11 10*3/mm3      nRBC 0.4 /100 WBC     Protime-INR [[id number]]  (Abnormal) Collected: 05/26/23 0142    Specimen: Blood Updated: 05/26/23 0159     Protime 17.1 Seconds      INR 1.40    Narrative:      Suggested Therapeutic Ranges For Oral Anticoagulant Therapy:  Level of Therapy                      INR Target Range  Standard Dose                            2.0-3.0  High Dose                                2.5-3.5  Patients not receiving anticoagulant  Therapy Normal Range                     0.86-1.15    Blood Culture - Blood, Arm, Left [[id number]] Collected: 05/26/23 0142    Specimen: Blood from Arm, Left Updated: 05/26/23 0148    Blood Culture - Blood, Arm, Left [[id number]] Collected: 05/26/23 0142    Specimen: Blood from Arm, Left Updated: 05/26/23 0147    ABG with  Co-Ox and Electrolytes [[id number]]  (Abnormal) Collected: 05/26/23 0142    Specimen: Arterial Blood from Arm, Left Updated: 05/26/23 0145     pH, Arterial 7.429 pH units      pCO2, Arterial 29.8 mm Hg      pO2, Arterial 78.0 mm Hg      HCO3, Arterial 19.3 mmol/L      Base Excess, Arterial -3.7 mmol/L      O2 Saturation, Arterial 95.3 %      Hemoglobin, Blood Gas 15.2 g/dL      Carboxyhemoglobin 2.0 %      Methemoglobin 0.20 %      Oxyhemoglobin 93.2 %      FHHB 4.6 %      Shane's Test Positive     Note 2l     Site Arterial: left radial     Modality Cannula - Nasal     FIO2 28 %      Flow Rate 2 lpm      Sodium, Arterial 134.6 mmol/L      Potassium, Arterial 3.22 mmol/L      Ionized Calcium, Arterial 1.11 mmol/L      Chloride, Arterial 100 mmol/L      Glucose, Arterial 181 mg/dL      Lactate, Arterial 6.56 mmol/L      PO2/FIO2 279        Imaging Results (Last 24 Hours)     Procedure Component Value Units Date/Time    XR Chest 1 View [[id number]] Collected: 05/26/23 0227     Updated: 05/26/23 0230    Narrative:      PROCEDURE: XR CHEST 1 VW     COMPARISONS: 5/4/2023; 11/17/2022.     INDICATIONS: Possible sepsis; h/o lymphoma.     FINDINGS:  A single AP (or PA) upright portable chest radiograph was performed.  No cardiac   enlargement is seen.  No acute infiltrate is appreciated.  No pleural effusion or pneumothorax is   identified.  There is a left IJ central venous line in place with its distal tip in the expected   lower superior vena cava (SVC).  The patient has undergone transcatheter aortic valve replacement   (TAVR), seen previously.  The thoracic aorta is atherosclerotic.  There is emphysematous contour of   the lungs.  Chronic calcified granulomatous disease involves the chest also.  There is pulmonary   hypoinflation.  External artifacts obscure detail.  No significant interval change is seen since   the prior study (or studies).       Impression:        No acute infiltrate is appreciated.           Please  note that portions of this note were completed with a voice recognition program.     JEN JONES JR, MD         Electronically Signed and Approved By: JEN JONES JR, MD on 5/26/2023 at 2:27                        CT Head Without Contrast [[id number]] Collected: 05/26/23 0222     Updated: 05/26/23 0225    Narrative:      PROCEDURE: CT HEAD WO CONTRAST     COMPARISONS: 5/4/2023; 1/19/2018.     INDICATIONS: 57-year-old female w/ h/o mental status change, unknown cause; the patient was found   on the floor; h/o lymphoma, not otherwise specified.     PROTOCOL:   Standard CT imaging protocol performed.      RADIATION:   Total DLP: 2,224.2 mGy*cm.    MA and/or KV were/was adjusted to minimize radiation dose.       TECHNIQUE: After obtaining the patient's consent, 282 CT images were obtained without non-ionic   intravenous contrast material.  There is slight motion artifact on the study.  The best possible   images were obtained.     DISCUSSION:   A routine nonenhanced head CT was performed.  No acute brain abnormality is   identified.  No acute intracranial hemorrhage.  No acute infarct is seen.  No acute skull fracture.    No midline shift or acute intracranial mass effect is seen.  The ventricular size and   configuration are within normal limits.  There is an incidental most likely benign 8 mm pineal cyst   as seen on image 30 of series 201. Mild age-indeterminate mucosal thickening involves the imaged   paranasal sinuses.  No air-fluid interfaces are seen within the imaged paranasal sinuses.  There is   a prominent torus palatinus which is partially imaged.       Impression:       No acute brain abnormality is seen. Specifically, no acute intracranial hemorrhage,   no acute infarct, no significant intracranial mass lesion, and no acute intracranial mass effect   are appreciated.   The study is motion-limited.          Please note that portions of this note were completed with a voice recognition program.      JEN JONES JR, MD         Electronically Signed and Approved By: JEN JONES JR, MD on 5/26/2023 at 2:22                            ECG/EMG Results (last 24 hours)     Procedure Component Value Units Date/Time    ECG 12 Lead Other; Sepsis [[id number]] Collected: 05/26/23 0126     Updated: 05/26/23 0131     QT Interval 390 ms     Narrative:      HEART RATE= 105  bpm  RR Interval= 580  ms  NV Interval= 153  ms  P Horizontal Axis= -33  deg  P Front Axis= 42  deg  QRSD Interval= 134  ms  QT Interval= 390  ms  QRS Axis= 28  deg  T Wave Axis= 151  deg  - ABNORMAL ECG -  Sinus tachycardia  Multiple ventricular premature complexes  Left bundle branch block  ST elevation secondary to IVCD  No previous ECG available for comparison  Electronically Signed By:   Date and Time of Study: 2023-05-26 01:26:39    ECG 12 Lead Tachycardia [[id number]] Collected: 05/26/23 0650     Updated: 05/26/23 0651     QT Interval 373 ms     Narrative:      HEART RATE= 102  bpm  RR Interval= 592  ms  NV Interval= 149  ms  P Horizontal Axis= -34  deg  P Front Axis= 59  deg  QRSD Interval= 126  ms  QT Interval= 373  ms  QRS Axis= 32  deg  T Wave Axis= 153  deg  - ABNORMAL ECG -  Sinus tachycardia  Left bundle branch block  ST elevation secondary to IVCD  Electronically Signed By:   Date and Time of Study: 2023-05-26 06:50:49    Adult Transthoracic Echo Complete w/ Color, Spectral and Contrast if necessary per protocol [[id number]] Resulted: 05/26/23 1124     Updated: 05/26/23 1135     Target HR (85%) 139 bpm      Max. Pred. HR (100%) 163 bpm      EF(MOD-bp) 45.7 %      LVIDd 4.0 cm      LVIDs 2.9 cm      IVSd 1.40 cm      LVPWd 1.40 cm      FS 26.3 %      IVS/LVPW 1.00 cm      ESV(cubed) 24.8 ml      LV Sys Vol (BSA corrected) 17.8 cm2      EDV(cubed) 62.0 ml      LV Burk Vol (BSA corrected) 30.3 cm2      LVOT area 3.1 cm2      LV mass(C)d 205.7 grams      LVOT diam 1.98 cm      EDV(MOD-sp2) 92.4 ml      EDV(MOD-sp4) 54.0 ml      ESV(MOD-sp2)  45.6 ml      ESV(MOD-sp4) 31.7 ml      SV(MOD-sp2) 46.8 ml      SV(MOD-sp4) 22.3 ml      SI(MOD-sp2) 26.2 ml/m2      SI(MOD-sp4) 12.5 ml/m2      EF(MOD-sp2) 50.6 %      EF(MOD-sp4) 41.3 %      MV E max joaquim 154.0 cm/sec      MV A max joaquim 153.5 cm/sec      MV dec time 240 msec      MV E/A 1.00     LA ESV Index (BP) 37.5 ml/m2      Med Peak E' Joaquim 6.5 cm/sec      Lat Peak E' Joaquim 6.1 cm/sec      Avg E/e' ratio 24.44     SV(LVOT) 56.3 ml      RVIDd 2.7 cm      LA dimension (2D)  3.9 cm      LV V1 max 118.7 cm/sec      LV V1 max PG 5.6 mmHg      LV V1 mean PG 3.2 mmHg      LV V1 VTI 18.3 cm      Ao mean PG 23.4 mmHg      Ao V2 VTI 52.3 cm      ANDREA(I,D) 1.08 cm2      MV mean PG 5.2 mmHg      MV V2 VTI 34.4 cm      MV P1/2t 68.4 msec      MVA(P1/2t) 3.2 cm2      MVA(VTI) 1.63 cm2      MV dec slope 668.9 cm/sec2      MR max joaquim 404.5 cm/sec      MR max PG 65.4 mmHg      MR mean joaquim 298.9 cm/sec      MR mean PG 40.0 mmHg      MR VTI 94.7 cm      TR max joaquim 323.4 cm/sec      TR max PG 41.8 mmHg      RVSP(TR) 44.8 mmHg      RAP systole 3.0 mmHg      Ao root diam 2.6 cm      Ao pk joaquim 316.0 cm/sec      Ao max PG 40 mmHg      MV max PG 10.00 mmHg      Ascending aorta 3.0 cm     Narrative:      •  Left ventricular ejection fraction appears to be 46 - 50%.  •  Left ventricular wall thickness is consistent with mild concentric   hypertrophy.  •  Left ventricular diastolic function was indeterminate.  •  There is a TAVR valve present.  •  Moderate to severe tricuspid valve regurgitation is present.  •  Estimated right ventricular systolic pressure from tricuspid   regurgitation is mildly elevated (35-45 mmHg).    There were no apparent intracardiac masses, vegetations or thrombi.            Orders (active)      Start     Ordered    05/27/23 0600  CBC & Differential  Every Third Day      Comments: Discontinue After Heparin Stopped      05/26/23 0628    05/27/23 0600  aPTT  Daily       05/26/23 0628    05/27/23 0600  CBC & Differential   Morning Draw         05/26/23 0707    05/27/23 0600  Comprehensive Metabolic Panel  Morning Draw         05/26/23 0707    05/27/23 0600  Magnesium  Morning Draw         05/26/23 0707    05/27/23 0600  Phosphorus  Morning Draw         05/26/23 0707    05/27/23 0600  Lactic Acid, Plasma  Morning Draw         05/26/23 0707    05/27/23 0230  cefepime (MAXIPIME) IVPB 2 g (premix) in D5  Every 24 Hours         05/26/23 0628    05/26/23 2100  atorvastatin (LIPITOR) tablet 80 mg  Nightly         05/26/23 0628    05/26/23 2100  insulin detemir (LEVEMIR) injection 15 Units  Nightly         05/26/23 0825    05/26/23 1800  STAT Lactic Acid, Reflex  PROCEDURE ONCE         05/26/23 1227    05/26/23 1600  Vancomycin, Random  Timed         05/26/23 0717    05/26/23 1224  Fungitell B-D Glucan  Once         05/26/23 1224    05/26/23 1200  POC Glucose Q6H  Every 6 Hours       05/26/23 0628    05/26/23 1145  Patient Supplied Pain Pump  Continuous         05/26/23 1059    05/26/23 1130  Thyroid Panel With TSH  Once         05/26/23 1130    05/26/23 1108  Lipase  Once         05/26/23 1107    05/26/23 1016  CT Chest Without Contrast Diagnostic  1 Time Imaging        Comments: Sepsis/shock    05/26/23 1016    05/26/23 1016  CT Abdomen Pelvis Without Contrast  1 Time Imaging         05/26/23 1016    05/26/23 0915  Vasopressin (VASOSTRICT) 0.2 UNIT/ML solution  Continuous         05/26/23 0819    05/26/23 0900  sodium chloride 0.9 % flush 10 mL  Every 12 Hours Scheduled         05/26/23 0628    05/26/23 0900  sennosides-docusate (PERICOLACE) 8.6-50 MG per tablet 2 tablet  2 Times Daily        See Hyperspace for full Linked Orders Report.    05/26/23 0628    05/26/23 0900  clopidogrel (PLAVIX) tablet 75 mg  Daily         05/26/23 0628    05/26/23 0900  Inpatient Nutrition Consult  Once        Provider:  (Not yet assigned)    05/26/23 0859    05/26/23 0830  magnesium sulfate 4g/100mL (PREMIX) infusion  Once         05/26/23 0823    05/26/23  0822  Place Feeding Tube Per Cortrak System  Until Discontinued         05/26/23 0821    05/26/23 0814  Wound Ostomy Eval & Treat  Once         05/26/23 0814    05/26/23 0800  Vital Signs  Every 4 Hours       05/26/23 0628    05/26/23 0800  Oral Care  2 Times Daily       05/26/23 0628    05/26/23 0717  Hepatitis Panel, Acute  Once         05/26/23 0628    05/26/23 0715  lactated ringers infusion  Continuous         05/26/23 0628    05/26/23 0715  potassium chloride 10 mEq in 100 mL IVPB  Every 1 Hour         05/26/23 0628    05/26/23 0715  insulin regular (humuLIN R,novoLIN R) injection 2-7 Units  Every 6 Hours Scheduled         05/26/23 0628    05/26/23 0708  MRSA Screen, PCR (Inpatient) - Swab, Nares  Once         05/26/23 0707    05/26/23 0629  Magnesium  Daily       05/26/23 0628    05/26/23 0629  Basic Metabolic Panel  Daily       05/26/23 0628    05/26/23 0628  Intake & Output  Every Shift       05/26/23 0628    05/26/23 0628  Weigh Patient  Once         05/26/23 0628    05/26/23 0628  Insert Peripheral IV  Once         05/26/23 0628    05/26/23 0628  Vital Signs Every 15 Minutes Until Stable, Then Every 4 Hours  Continuous         05/26/23 0628    05/26/23 0628  Telemetry - Place Orders & Notify Provider of Results When Patient Experiences Acute Chest Pain, Dysrhythmia or Respiratory Distress  Until Discontinued         05/26/23 0628    05/26/23 0628  Notify Physician For Unrelieved Chest Pain  Until Discontinued         05/26/23 0628    05/26/23 0628  ECG 12 Lead Tachycardia  Now & in 6 Hours      Comments: Perform 6 Hours After Last EKG (If Done)    05/26/23 0628    05/26/23 0628  Cardiac Rehab Evaluation and Enrollment  Once        Provider:  (Not yet assigned)    05/26/23 0628    05/26/23 0628  Initiate & Follow Heparin Protocol  Continuous         05/26/23 0628    05/26/23 0628  Continuous Cardiac Monitoring  Continuous        Comments: Follow Standing Orders As Outlined in Process Instructions (Open  Order Report to View Full Instructions)    05/26/23 0628    05/26/23 0628  Telemetry - Maintain IV Access  Continuous         05/26/23 0628    05/26/23 0628  Telemetry - Place Orders & Notify Provider of Results When Patient Experiences Acute Chest Pain, Dysrhythmia or Respiratory Distress  Until Discontinued         05/26/23 0628    05/26/23 0628  Stop Infusion & Notify Provider if Bleeding Occurs  Until Discontinued         05/26/23 0628    05/26/23 0628  Notify Provider if PTT Not in Therapeutic Range After 24 Hours  Until Discontinued         05/26/23 0628    05/26/23 0628  Notify Provider Platelet Count Less Than 29562  Until Discontinued         05/26/23 0628    05/26/23 0628  Continuous Pulse Oximetry  Continuous         05/26/23 0628 05/26/23 0628  NPO Diet NPO Type: Strict NPO  Diet Effective Now         05/26/23 0628 05/26/23 0628  Inpatient Cardiology Consult  Once        Specialty:  Cardiology  Provider:  Ada Malone MD    05/26/23 0628 05/26/23 0627  polyethylene glycol (MIRALAX) packet 17 g  Daily PRN        See Hyperspace for full Linked Orders Report.    05/26/23 0628    05/26/23 0627  bisacodyl (DULCOLAX) EC tablet 5 mg  Daily PRN        See Hyperspace for full Linked Orders Report.    05/26/23 0628    05/26/23 0627  bisacodyl (DULCOLAX) suppository 10 mg  Daily PRN        See Hyperspace for full Linked Orders Report.    05/26/23 0628 05/26/23 0627  Pharmacy to dose vancomycin  Continuous PRN        See Hyperspace for full Linked Orders Report.    05/26/23 0628    05/26/23 0627  dextrose (GLUTOSE) oral gel 15 g  Every 15 Minutes PRN         05/26/23 0628    05/26/23 0627  dextrose (D50W) (25 g/50 mL) IV injection 25 g  Every 15 Minutes PRN         05/26/23 0628    05/26/23 0627  glucagon (GLUCAGEN) injection 1 mg  Every 15 Minutes PRN         05/26/23 0628    05/26/23 0627  sodium chloride 0.9 % flush 10 mL  As Needed         05/26/23 0628 05/26/23 0627  sodium chloride 0.9 %  infusion 40 mL  As Needed         05/26/23 0628    05/26/23 0627  nitroglycerin (NITROSTAT) SL tablet 0.4 mg  Every 5 Minutes PRN         05/26/23 0628    05/26/23 0454  Code Status and Medical Interventions:  Continuous         05/26/23 0521    05/26/23 0340  Hospitalist (on-call MD unless specified)  Once        Specialty:  Hospitalist  Provider:  Des Rocha DO    05/26/23 0339    05/26/23 0330  norepinephrine (LEVOPHED) 8 mg in 250 mL NS infusion (premix)  Titrated         05/26/23 0314    05/26/23 0126  Insert Peripheral IV  Once         05/26/23 0126    05/26/23 0126  Insert 2nd Large Bore Peripheral IV  Once         05/26/23 0126    05/26/23 0126  Blood Culture - Blood, Arm, Left  Once         05/26/23 0126    05/26/23 0126  Blood Culture - Blood, Arm, Left  Once         05/26/23 0126    Unscheduled  Oxygen Therapy- Nasal Cannula; Titrate 1-6 LPM Per SpO2; 90 - 95%  Continuous PRN       05/26/23 0126    Unscheduled  aPTT  As Needed       05/26/23 0628    Unscheduled  RN To Release aPTT Order 6 Hours After Heparin Bolus & 6 Hours After Any Heparin Rate Change  As Needed       05/26/23 0628    Unscheduled  After 2 Consecutive Therapeutic aPTTs, Obtain aPTT Daily.  If a Rate Adjustment is Necessary, Resume Every 6 Hour aPTT Draws  As Needed       05/26/23 0628    Unscheduled  Follow Hypoglycemia Standing Orders For Blood Glucose <70 & Notify Provider of Treatment  As Needed      Comments: Follow Hypoglycemia Orders As Outlined in Process Instructions (Open Order Report to View Full Instructions)  Notify Provider Any Time Hypoglycemia Treatment is Administered    05/26/23 0628                Ventilator/Non-Invasive Ventilation Settings (From admission, onward)     Start     Ordered    05/26/23 0628  NIPPV - Provider Settings  (CPAP / BiPAP - Provider Settings)  Until Discontinued,   Status:  Canceled        Question Answer Comment   Indication Sleep Apnea    Type AutoBIPAP    Titrate Oxygen for SpO2 94% or  Greater        23 0628                       Consult Notes (last 24 hours)      Enzo Segal MD at 23 0705      Consult Orders    1. Inpatient Pulmonology Consult [[id number]] ordered by Enzo Segal MD at 23 0709               Pulmonary / Critical Care Consult Note      Patient Name: Isha Llanes  : 1965  MRN: [id number]  Primary Care Physician:  Vidal Luevano APRN  Referring Physician: Mic Herrera MD  Date of admission: 2023    Subjective   Subjective     Reason for Consult/ Chief Complaint: Septic shock    HPI:  Isha Llanes is a 57 y.o. female with recent diagnosis of throat cancer, history of lymphoma, type 2 diabetes, aortic stenosis s/p TAVR, chronic pain with indwelling pain pump presented to the ED after family found her slumped over.  Patient was found to be hypotensive with BP in the 60s, temperature 99.1. Unable to obtain detailed history from pt. Laboratory  findings showed WBC 10, hemoglobin 15, platelets 95, lactic acid 9.6, Pro-Ge greater than 400, creatinine 2.0, potassium 3.0, CO2 19, ALT/AST 99/192.  She was given IV fluid resuscitation, initiated on antibiotics in the emergency room.   Additionally she was placed on Levophed/and epi for hypotension.    Admitted to the intensive care unit for critical care management     Currently, patient very lethargic and wakes up and grimaces to pain but not really communicating well.  Is on norepinephrine and epinephrine drips.  Very weak and fatigued.  Is able to protect airway at this time.    Review of Systems  Cannot obtain due to confusion with lethargy and altered mental status      Personal History     Cannot obtain medical history due to confusion and lethargy with altered mental status    Cannot obtain surgical history due to confusion and lethargy with altered mental status    Cannot obtain family history due to confusion and lethargy with altered mental status    Cannot obtain social history  due to confusion and lethargy with altered mental status    Home Medications:  Insulin Lispro (1 Unit Dial), LORazepam, acetaminophen, albuterol sulfate HFA, bacitracin, benzocaine-menthol, cetirizine, clopidogrel, furosemide, gabapentin, insulin detemir, loperamide, pain, and pantoprazole    Allergies:  Allergies   Allergen Reactions   • Amoxicillin Shortness Of Breath   • Penicillins Shortness Of Breath       Objective    Objective     Vitals:   Temp:  [98.8 °F (37.1 °C)-99.1 °F (37.3 °C)] 98.8 °F (37.1 °C)  Heart Rate:  [] 108  Resp:  [18-22] 22  BP: ()/(12-73) 92/48  Flow (L/min):  [2] 2    Physical Exam:  Vital Signs Reviewed   General: Frail elderly critically ill female, lethargic but arousable to painful stimuli  HEENT:  PERRL, EOMI.  OP, nares clear  Neck:  Supple, no JVD, no thyromegaly  Chest:   Poor aeration, trace rhonchi bilaterally, tympanic to percussion bilaterally, no work of breathing noted, port site is clean/dry/intact  CV: Sinus tachycardia, no MGR, pulses 2+, equal.  Abd:  Soft, NT, ND, + BS, no HSM  EXT:  no clubbing, no cyanosis, no edema, bilateral wounds lower extremities  Neuro:  A&Ox1, grimacing and groaning to pain, lethargic, responds to name, CN grossly intact, no focal deficits.  Skin: No rashes or lesions noted, extremities cold      Result Review    Result Review:  I have personally reviewed the results from the time of this admission to 5/26/2023 10:47 EDT and agree with these findings:  [x]  Laboratory  [x]  Microbiology  [x]  Radiology  [x]  EKG/Telemetry   [x]  Cardiology/Vascular   []  Pathology  [x]  Old records  []  Other:  Most notable findings include:       Lab 05/26/23  0708 05/26/23  0142   WBC  --  10.29   HEMOGLOBIN  --  15.1   HEMATOCRIT  --  46.4   PLATELETS  --  95*   SODIUM 136 137   SODIUM, ARTERIAL  --  134.6*   POTASSIUM 3.5 3.4*   CHLORIDE 103 96*   CO2 17.9* 19.0*   BUN 30* 25*   CREATININE 1.32* 2.07*   GLUCOSE 168* 172*   GLUCOSE, ARTERIAL  --   181*   CALCIUM 8.0* 9.6   TOTAL PROTEIN  --  7.2   ALBUMIN  --  3.8   GLOBULIN  --  3.4     Procalcitonin over 400  Lactic acid 9  Chest x-ray with no obvious airspace disease  Head CT with no acute pathology    Recent throat biopsy positive for malignancy      Assessment & Plan   Assessment / Plan     Active Hospital Problems:  Active Hospital Problems    Diagnosis    • **Septic shock      Impression:  Impression:  Septic shock, present on admission  Altered mental status  Toxic/metabolic encephalopathy  Anion gap metabolic acidosis  Lactic acidosis, clinically significant  Acute kidney injury related to ATN versus prerenal etiology  Hypotension  Hypokalemia  Hypomagnesia   Thrombocytopenia  Transaminitis  Type 2 diabetes with hyperglycemia  Recent diagnosis of throat cancer  History of lymphoma  NSTEMI, likely demand type II  Severe aortic stenosis status post TAVR     Plan:  Continue IV fluid resuscitation per sepsis guidelines  Give additional 3 L lactated Ringer's   Trend lactic acid  Continue vasopressors, use Levophed, add vasopressin.  Discontinue epinephrine drip.  Goal mean arterial pressure greater than 65  We will discontinue heparin drip now as I feel the elevated troponin is secondary to demand ischemia  Echocardiogram ordered  Continue broad-spectrum antibiotics cefepime and vancomycin  Panculture blood urine and sputum, check urine antigens, MRSA PCR, unclear of source at this time  We will check CT abdomen, pelvis and chest without contrast  Patient does have Port-A-Cath, okay to access in use at this time.  I see no obvious evidence of port infection or bacteremia at this time.  We will discontinue heparin, check fibrinogen, follow platelets  Patient does not have mechanical valve-history of TAVR  Trend renal function, monitor replace electrolytes, replace potassium and magnesium IV  Continue optimize glucose with BAER/SSI  Trend LFTs  Hepatitis panel pending  We will order for core track to be  placed.  Start tube feeds per dietitian recommendations  When clinically appropriate have speech evaluate patient  Check UDS  Monitor mental status closely.  Continue aspiration precautions.     DVT prophylaxis:  No DVT prophylaxis order currently exists.     Code Status and Medical Interventions:   Ordered at: 05/26/23 0521     Level Of Support Discussed With:    Next of Kin (If No Surrogate)     Code Status (Patient has no pulse and is not breathing):    CPR (Attempt to Resuscitate)     Medical Interventions (Patient has pulse or is breathing):    Full Support     Comments:    daughter at bedside confirmed code status      Discussed extensively with family at bedside this morning      The patient is critically ill in the ICU with septic shock, acute kidney injury, altered mental status, multiple electrolyte disturbances, NSTEMI. Multidisciplinary bedside critical care rounds were performed with nursing staff, respiratory therapy, pharmacy, nutritional services, social work. I have personally reviewed the chart, labs and any pertinent imaging available.  We have spent 104 minutes of critical care time managing this patient.  Of this, I have spent 84 minutes of critical care time, excluding procedures, in the care of this patient in accordance with split shared billing.    I, LUZ Friedman, spent 20 minutes critical care time in accordance to split shared billing.    Electronically signed by Enzo Segal MD, 05/26/23, 10:49 AM EDT.      Electronically signed by Enzo Segal MD at 05/26/23 1059

## 2023-05-26 NOTE — CASE MANAGEMENT/SOCIAL WORK
Discharge Planning Assessment   Abbi     Patient Name: Isha Llanes  MRN: [id number]  Today's Date: 5/26/2023    Admit Date: 5/26/2023    Plan: DEL met with pt's daugther and stepmom at the bedside. RN was going to access pt's port and asked us to leave to maintain a sterile environment. SW took family to family conference room to finish assessment. Pt's daughter reports that pt lives alone and confirmed PCP is Vidal Luevano and pharmacy is [facility]. She does feel like pt should use Meds to Beds at discharge. Daughter reports that pt stays in her standard wheelchair all the time at home and that she even sleeps in it or on the couch. She said that pt has a motorized wheelchair as well but it does not fit in the home. Pt uses the motorized wheelchair outside and takes it across the street to the store when she needs groceries. Daughter states that the standard wheelchair is old and worn out and pt needs a new one, she said it is older than 5 years. DEL/FIDELINA can assist with getting new standard wheelchair prior to discharge, daughter agreeable and would like to use [employer]. Daughter states that pt is independent at home with meals and personal hygiene unless she just isn't feeling well at all. Pt uses [facility] to get to appointments, she does not drive. Per daughter pt has a new diagnosis of throat cancer; they are not certain what the plan will be for that. SW asked about inpatient rehab and daughter states that she thinks pt will refuse that. She has home health care, daughter thinks through [facility], DEL checking on status. Daughter asked about PT/OT with home health and SW will work on that prior to discharge. Daughter asked about other in home services that might help pt through Medicaid and DEL provided her with contact information for [employer] for Medicaid Waiver Services. DEL also provided information on [facility]. DEL will continue to follow.   Discharge Needs Assessment     Row Name 05/26/23 0158        Living Environment    People in Home alone    Current Living Arrangements home    Potentially Unsafe Housing Conditions none    Primary Care Provided by self    Family Caregiver if Needed child(felix), adult;parent(s)    Family Caregiver Names Daughter - Sheila is supportive but also has 4 children of her own. Stepmom - Almita is supportive.    Quality of Family Relationships unable to assess;supportive;involved    Able to Return to Prior Arrangements yes       Resource/Environmental Concerns    Resource/Environmental Concerns home accessibility    Home Accessibility Concerns not wheelchair accessible    Transportation Concerns none       Food Insecurity    Within the past 12 months, you worried that your food would run out before you got the money to buy more. Never true    Within the past 12 months, the food you bought just didn't last and you didn't have money to get more. Never true       Transition Planning    Patient/Family Anticipates Transition to home with help/services    Patient/Family Anticipated Services at Transition home health care    Transportation Anticipated family or friend will provide       Discharge Needs Assessment    Readmission Within the Last 30 Days no previous admission in last 30 days    Current Outpatient/Agency/Support Group homecare agency    Equipment Currently Used at Home wheelchair;wheelchair, motorized    Concerns to be Addressed discharge planning    Equipment Needed After Discharge wheelchair, manual    Outpatient/Agency/Support Group Needs homecare agency;inpatient rehabilitation facility    Discharge Facility/Level of Care Needs home with home health    Provided Post Acute Provider List? N/A    N/A Provider List Comment Daughter states that pt is current with [facility] - checking on status.    Current Discharge Risk lives alone               Discharge Plan     Row Name 05/26/23 8498       Plan    Plan SW met with pt's amee and everette at the bedside. RN was  going to access pt's port and asked us to leave to maintain a sterile environment. SW took family to family conference room to finish assessment. Pt's daughter reports that pt lives alone and confirmed PCP is V[facility] Luevano and pharmacy is [facility]. She does feel like pt should use Meds to Beds at discharge. Daughter reports that pt stays in her standard wheelchair all the time at home and that she even sleeps in it or on the couch. She said that pt has a motorized wheelchair as well but it does not fit in the home. Pt uses the motorized wheelchair outside and takes it across the street to the store when she needs groceries. Daughter states that the standard wheelchair is old and worn out and pt needs a new one, she said it is older than 5 years. DEL/FIDELINA can assist with getting new standard wheelchair prior to discharge, daughter agreeable and would like to use [facility]. Daughter states that pt is independent at home with meals and personal hygiene unless she just isn't feeling well at all. Pt uses [facility] to get to appointments, she does not drive. Per daughter pt has a new diagnosis of throat cancer; they are not certain what the plan will be for that. SW asked about inpatient rehab and daughter states that she thinks pt will refuse that. She has home health care, daughter thinks through [facility], DEL checking on status. Daughter asked about PT/OT with home health and SW will work on that prior to discharge. Daughter asked about other in home services that might help pt through Medicaid and SW provided her with contact information for LT[employer] for Medicaid Waiver Services. DEL also provided information on [employer]. DEL will continue to follow.              Continued Care and Services - Admitted Since 5/26/2023    Coordination has not been started for this encounter.          Demographic Summary     Row Name 05/26/23 0525       General Information    Admission Type inpatient    Arrived From home    Referral  Source admission list    Reason for Consult discharge planning       Contact Information    Permission Granted to Share Info With family/designee               Functional Status     Row Name 05/26/23 1258       Functional Status    Usual Activity Tolerance moderate    Current Activity Tolerance moderate    Functional Status Comments Pt stays in a wheelchair but gets around independently.       Physical Activity    On average, how many days per week do you engage in moderate to strenuous exercise (like a brisk walk)? 7 days    On average, how many minutes do you engage in exercise at this level? 40 min    Number of minutes of exercise per week 280       Functional Status, IADL    Medications independent    Meal Preparation independent    Housekeeping assistive person    Laundry assistive person    Shopping assistive equipment    IADL Comments Pt takes her motorized wheelchair to the store to get groceries.       Mental Status Summary    Recent Changes in Mental Status/Cognitive Functioning unable to assess    Mental Status Comments Pt is currently confused, per daughter, this is not her baseline.       Employment/    Employment Status disabled               Psychosocial     Row Name 05/26/23 1301       Coping/Stress    Major Change/Loss/Stressor unable to assess    Patient Personal Strengths strong support system    Sources of Support adult child(felix);parent(s)    Reaction to Health Status unable to assess    Understanding of Condition and Treatment unable to assess       Developmental Stage (Eriksson's)    Developmental Stage Stage 7 (35-65 years/Middle Adulthood) Generativity vs. Stagnation       C-SSRS (Recent)    Q1 Wished to be Dead (Past Month) no    Q2 Suicidal Thoughts (Past Month) no    Q6 Suicide Behavior (Lifetime) no       Violence Risk    Feels Like Hurting Others no    Previous Attempt to Harm Others no               Abuse/Neglect     Row Name 05/26/23 1302       Personal Safety    Feels Unsafe  at Home or Work/School no    Feels Threatened by Someone no    Does Anyone Try to Keep You From Having Contact with Others or Doing Things Outside Your Home? no    Physical Signs of Abuse Present no               Legal     Row Name 05/26/23 1300       Financial Resource Strain    How hard is it for you to pay for the very basics like food, housing, medical care, and heating? Somewhat       Financial/Legal    Source of Income disability    Application for Public Assistance applied    Finance Comments Daughter reports that pt is on the wait list for handicap accessible housing.       Legal    Criminal Activity/Legal Involvement none               Substance Abuse    No documentation.                Patient Forms    No documentation.                   SHAWN Varela

## 2023-05-27 LAB
ALBUMIN SERPL-MCNC: 2.9 G/DL (ref 3.5–5.2)
ALBUMIN/GLOB SERPL: 1 G/DL
ALP SERPL-CCNC: 89 U/L (ref 39–117)
ALT SERPL W P-5'-P-CCNC: 132 U/L (ref 1–33)
ANION GAP SERPL CALCULATED.3IONS-SCNC: 8.3 MMOL/L (ref 5–15)
APTT PPP: 41.3 SECONDS (ref 78–95.9)
AST SERPL-CCNC: 177 U/L (ref 1–32)
B PARAPERT DNA SPEC QL NAA+PROBE: NOT DETECTED
B PERT DNA SPEC QL NAA+PROBE: NOT DETECTED
BILIRUB SERPL-MCNC: 0.6 MG/DL (ref 0–1.2)
BUN SERPL-MCNC: 19 MG/DL (ref 6–20)
BUN/CREAT SERPL: 33.3 (ref 7–25)
C PNEUM DNA NPH QL NAA+NON-PROBE: NOT DETECTED
CALCIUM SPEC-SCNC: 7.7 MG/DL (ref 8.6–10.5)
CHLORIDE SERPL-SCNC: 107 MMOL/L (ref 98–107)
CO2 SERPL-SCNC: 22.7 MMOL/L (ref 22–29)
CREAT SERPL-MCNC: 0.57 MG/DL (ref 0.57–1)
D-LACTATE SERPL-SCNC: 3 MMOL/L (ref 0.5–2)
DEPRECATED RDW RBC AUTO: 44.6 FL (ref 37–54)
EGFRCR SERPLBLD CKD-EPI 2021: 106.1 ML/MIN/1.73
ERYTHROCYTE [DISTWIDTH] IN BLOOD BY AUTOMATED COUNT: 14.7 % (ref 12.3–15.4)
FLUAV SUBTYP SPEC NAA+PROBE: NOT DETECTED
FLUBV RNA ISLT QL NAA+PROBE: NOT DETECTED
GLOBULIN UR ELPH-MCNC: 2.9 GM/DL
GLUCOSE BLDC GLUCOMTR-MCNC: 185 MG/DL (ref 70–99)
GLUCOSE BLDC GLUCOMTR-MCNC: 61 MG/DL (ref 70–99)
GLUCOSE BLDC GLUCOMTR-MCNC: 77 MG/DL (ref 70–99)
GLUCOSE SERPL-MCNC: 116 MG/DL (ref 65–99)
HADV DNA SPEC NAA+PROBE: NOT DETECTED
HCOV 229E RNA SPEC QL NAA+PROBE: NOT DETECTED
HCOV HKU1 RNA SPEC QL NAA+PROBE: NOT DETECTED
HCOV NL63 RNA SPEC QL NAA+PROBE: NOT DETECTED
HCOV OC43 RNA SPEC QL NAA+PROBE: NOT DETECTED
HCT VFR BLD AUTO: 35.1 % (ref 34–46.6)
HGB BLD-MCNC: 11.6 G/DL (ref 12–15.9)
HMPV RNA NPH QL NAA+NON-PROBE: NOT DETECTED
HPIV1 RNA ISLT QL NAA+PROBE: NOT DETECTED
HPIV2 RNA SPEC QL NAA+PROBE: NOT DETECTED
HPIV3 RNA NPH QL NAA+PROBE: NOT DETECTED
HPIV4 P GENE NPH QL NAA+PROBE: NOT DETECTED
LYMPHOCYTES # BLD MANUAL: 0.38 10*3/MM3 (ref 0.7–3.1)
LYMPHOCYTES NFR BLD MANUAL: 1 % (ref 5–12)
M PNEUMO IGG SER IA-ACNC: NOT DETECTED
MAGNESIUM SERPL-MCNC: 2.4 MG/DL (ref 1.6–2.6)
MCH RBC QN AUTO: 27.4 PG (ref 26.6–33)
MCHC RBC AUTO-ENTMCNC: 33 G/DL (ref 31.5–35.7)
MCV RBC AUTO: 82.8 FL (ref 79–97)
METAMYELOCYTES NFR BLD MANUAL: 2 % (ref 0–0)
MONOCYTES # BLD: 0.19 10*3/MM3 (ref 0.1–0.9)
NEUTROPHILS # BLD AUTO: 18.14 10*3/MM3 (ref 1.7–7)
NEUTROPHILS NFR BLD MANUAL: 76 % (ref 42.7–76)
NEUTS BAND NFR BLD MANUAL: 19 % (ref 0–5)
NEUTS VAC BLD QL SMEAR: ABNORMAL
NT-PROBNP SERPL-MCNC: ABNORMAL PG/ML (ref 0–900)
PHOSPHATE SERPL-MCNC: 3.1 MG/DL (ref 2.5–4.5)
PLATELET # BLD AUTO: 50 10*3/MM3 (ref 140–450)
PMV BLD AUTO: 11.3 FL (ref 6–12)
POTASSIUM SERPL-SCNC: 3.8 MMOL/L (ref 3.5–5.2)
PROT SERPL-MCNC: 5.8 G/DL (ref 6–8.5)
RBC # BLD AUTO: 4.24 10*6/MM3 (ref 3.77–5.28)
RBC MORPH BLD: NORMAL
RHINOVIRUS RNA SPEC NAA+PROBE: NOT DETECTED
RSV RNA NPH QL NAA+NON-PROBE: NOT DETECTED
SARS-COV-2 RNA RESP QL NAA+PROBE: NOT DETECTED
SCAN SLIDE: NORMAL
SMALL PLATELETS BLD QL SMEAR: ABNORMAL
SODIUM SERPL-SCNC: 138 MMOL/L (ref 136–145)
VARIANT LYMPHS NFR BLD MANUAL: 2 % (ref 19.6–45.3)
WBC NRBC COR # BLD: 19.09 10*3/MM3 (ref 3.4–10.8)

## 2023-05-27 PROCEDURE — 25010000002 CEFTRIAXONE PER 250 MG: Performed by: INTERNAL MEDICINE

## 2023-05-27 PROCEDURE — 0202U NFCT DS 22 TRGT SARS-COV-2: CPT | Performed by: INTERNAL MEDICINE

## 2023-05-27 PROCEDURE — 83880 ASSAY OF NATRIURETIC PEPTIDE: CPT | Performed by: INTERNAL MEDICINE

## 2023-05-27 PROCEDURE — 25010000002 CEFEPIME PER 500 MG: Performed by: FAMILY MEDICINE

## 2023-05-27 PROCEDURE — 85007 BL SMEAR W/DIFF WBC COUNT: CPT | Performed by: FAMILY MEDICINE

## 2023-05-27 PROCEDURE — 94799 UNLISTED PULMONARY SVC/PX: CPT

## 2023-05-27 PROCEDURE — 82948 REAGENT STRIP/BLOOD GLUCOSE: CPT

## 2023-05-27 PROCEDURE — 25010000002 HYDROMORPHONE 1 MG/ML SOLUTION: Performed by: FAMILY MEDICINE

## 2023-05-27 PROCEDURE — 83735 ASSAY OF MAGNESIUM: CPT | Performed by: FAMILY MEDICINE

## 2023-05-27 PROCEDURE — 99291 CRITICAL CARE FIRST HOUR: CPT | Performed by: INTERNAL MEDICINE

## 2023-05-27 PROCEDURE — 85730 THROMBOPLASTIN TIME PARTIAL: CPT | Performed by: FAMILY MEDICINE

## 2023-05-27 PROCEDURE — 25010000002 ONDANSETRON PER 1 MG: Performed by: FAMILY MEDICINE

## 2023-05-27 PROCEDURE — 25010000002 ENOXAPARIN PER 10 MG: Performed by: NURSE PRACTITIONER

## 2023-05-27 PROCEDURE — 87522 HEPATITIS C REVRS TRNSCRPJ: CPT | Performed by: INTERNAL MEDICINE

## 2023-05-27 PROCEDURE — 99233 SBSQ HOSP IP/OBS HIGH 50: CPT | Performed by: FAMILY MEDICINE

## 2023-05-27 PROCEDURE — 25010000002 CALCIUM GLUCONATE-NACL 1-0.675 GM/50ML-% SOLUTION: Performed by: NURSE PRACTITIONER

## 2023-05-27 PROCEDURE — 85025 COMPLETE CBC W/AUTO DIFF WBC: CPT | Performed by: FAMILY MEDICINE

## 2023-05-27 PROCEDURE — 83605 ASSAY OF LACTIC ACID: CPT | Performed by: EMERGENCY MEDICINE

## 2023-05-27 PROCEDURE — 84100 ASSAY OF PHOSPHORUS: CPT | Performed by: INTERNAL MEDICINE

## 2023-05-27 PROCEDURE — 80053 COMPREHEN METABOLIC PANEL: CPT | Performed by: INTERNAL MEDICINE

## 2023-05-27 RX ORDER — SACCHAROMYCES BOULARDII 250 MG
250 CAPSULE ORAL 2 TIMES DAILY
Status: DISCONTINUED | OUTPATIENT
Start: 2023-05-27 | End: 2023-05-28

## 2023-05-27 RX ORDER — CHOLECALCIFEROL (VITAMIN D3) 125 MCG
10 CAPSULE ORAL NIGHTLY PRN
Status: DISCONTINUED | OUTPATIENT
Start: 2023-05-27 | End: 2023-05-28

## 2023-05-27 RX ORDER — POTASSIUM CHLORIDE 1.5 G/1.77G
40 POWDER, FOR SOLUTION ORAL ONCE
Status: COMPLETED | OUTPATIENT
Start: 2023-05-27 | End: 2023-05-27

## 2023-05-27 RX ORDER — ONDANSETRON 2 MG/ML
4 INJECTION INTRAMUSCULAR; INTRAVENOUS EVERY 6 HOURS PRN
Status: DISCONTINUED | OUTPATIENT
Start: 2023-05-27 | End: 2023-05-31

## 2023-05-27 RX ORDER — ENOXAPARIN SODIUM 100 MG/ML
40 INJECTION SUBCUTANEOUS
Status: DISCONTINUED | OUTPATIENT
Start: 2023-05-27 | End: 2023-05-31 | Stop reason: HOSPADM

## 2023-05-27 RX ORDER — NICOTINE 21 MG/24HR
1 PATCH, TRANSDERMAL 24 HOURS TRANSDERMAL
Status: DISCONTINUED | OUTPATIENT
Start: 2023-05-27 | End: 2023-05-31 | Stop reason: HOSPADM

## 2023-05-27 RX ORDER — CEFEPIME 1 G/50ML
2 INJECTION, SOLUTION INTRAVENOUS EVERY 8 HOURS
Status: DISCONTINUED | OUTPATIENT
Start: 2023-05-27 | End: 2023-05-27

## 2023-05-27 RX ORDER — MIDODRINE HYDROCHLORIDE 5 MG/1
5 TABLET ORAL
Status: DISCONTINUED | OUTPATIENT
Start: 2023-05-27 | End: 2023-05-27

## 2023-05-27 RX ORDER — CALCIUM GLUCONATE 20 MG/ML
1 INJECTION, SOLUTION INTRAVENOUS ONCE
Status: COMPLETED | OUTPATIENT
Start: 2023-05-27 | End: 2023-05-27

## 2023-05-27 RX ORDER — HALOPERIDOL 5 MG/ML
2 INJECTION INTRAMUSCULAR EVERY 6 HOURS PRN
Status: DISCONTINUED | OUTPATIENT
Start: 2023-05-27 | End: 2023-05-31 | Stop reason: HOSPADM

## 2023-05-27 RX ORDER — MIDODRINE HYDROCHLORIDE 5 MG/1
5 TABLET ORAL
Status: DISCONTINUED | OUTPATIENT
Start: 2023-05-27 | End: 2023-05-28

## 2023-05-27 RX ORDER — CEFTRIAXONE SODIUM 1 G/50ML
1 INJECTION, SOLUTION INTRAVENOUS
Status: DISCONTINUED | OUTPATIENT
Start: 2023-05-27 | End: 2023-05-28

## 2023-05-27 RX ADMIN — CLOPIDOGREL BISULFATE 75 MG: 75 TABLET ORAL at 08:10

## 2023-05-27 RX ADMIN — CEFTRIAXONE SODIUM 1 G: 1 INJECTION, SOLUTION INTRAVENOUS at 09:04

## 2023-05-27 RX ADMIN — DEXTROSE MONOHYDRATE 25 G: 25 INJECTION, SOLUTION INTRAVENOUS at 22:35

## 2023-05-27 RX ADMIN — HYDROMORPHONE HYDROCHLORIDE 0.5 MG: 1 INJECTION, SOLUTION INTRAMUSCULAR; INTRAVENOUS; SUBCUTANEOUS at 19:34

## 2023-05-27 RX ADMIN — ENOXAPARIN SODIUM 40 MG: 100 INJECTION SUBCUTANEOUS at 09:04

## 2023-05-27 RX ADMIN — CALCIUM GLUCONATE 1 G: 20 INJECTION, SOLUTION INTRAVENOUS at 09:39

## 2023-05-27 RX ADMIN — MIDODRINE HYDROCHLORIDE 5 MG: 5 TABLET ORAL at 17:26

## 2023-05-27 RX ADMIN — HYDROMORPHONE HYDROCHLORIDE 0.5 MG: 1 INJECTION, SOLUTION INTRAMUSCULAR; INTRAVENOUS; SUBCUTANEOUS at 16:03

## 2023-05-27 RX ADMIN — DEXTROSE MONOHYDRATE 25 G: 25 INJECTION, SOLUTION INTRAVENOUS at 18:41

## 2023-05-27 RX ADMIN — NICOTINE 1 PATCH: 21 PATCH, EXTENDED RELEASE TRANSDERMAL at 11:32

## 2023-05-27 RX ADMIN — HYDROMORPHONE HYDROCHLORIDE 0.5 MG: 1 INJECTION, SOLUTION INTRAMUSCULAR; INTRAVENOUS; SUBCUTANEOUS at 11:36

## 2023-05-27 RX ADMIN — Medication 250 MG: at 21:32

## 2023-05-27 RX ADMIN — ATORVASTATIN CALCIUM 80 MG: 40 TABLET, FILM COATED ORAL at 21:32

## 2023-05-27 RX ADMIN — Medication 10 ML: at 21:32

## 2023-05-27 RX ADMIN — CEFEPIME 2 G: 1 INJECTION, SOLUTION INTRAVENOUS at 04:12

## 2023-05-27 RX ADMIN — MIDODRINE HYDROCHLORIDE 5 MG: 5 TABLET ORAL at 11:32

## 2023-05-27 RX ADMIN — Medication 10 ML: at 08:10

## 2023-05-27 RX ADMIN — Medication 0.12 MCG/KG/MIN: at 14:57

## 2023-05-27 RX ADMIN — HYDROMORPHONE HYDROCHLORIDE 0.5 MG: 1 INJECTION, SOLUTION INTRAMUSCULAR; INTRAVENOUS; SUBCUTANEOUS at 23:25

## 2023-05-27 RX ADMIN — ONDANSETRON 4 MG: 2 INJECTION INTRAMUSCULAR; INTRAVENOUS at 17:26

## 2023-05-27 RX ADMIN — MIDODRINE HYDROCHLORIDE 5 MG: 5 TABLET ORAL at 09:04

## 2023-05-27 RX ADMIN — POTASSIUM CHLORIDE FOR ORAL SOLUTION 40 MEQ: 1.5 POWDER, FOR SOLUTION ORAL at 09:03

## 2023-05-27 NOTE — PROGRESS NOTES
Date of service: 5/27/2023    Subjective: Chest discomfort at Port-A-Cath site.  No SOB palpitations or dizziness    Review of systems: Has back pain.  No headaches, vertigo, nausea, vomiting, abdominal pain, fever or chills TIA or CVA-like symptoms.    Physical examination: She was in no respiratory distress.  Vital signs: Reviewed  HEENT: Sclera nonicteric.  Neck: No JVD or carotid bruit  Chest:  CTA.  Cardiac: RRR.  2/6 stock murmur over the left sternal border.  No S3 gallop.  Abdomen: Soft and nontender  Extremities: 1/4 bilateral lower leg and ankle edema.  Chronic dermatitic skin changes  Neuro: Alert, oriented, no facial droop and speech was clear       Records: Reviewed     Assessment and plan:     1.  Acute non-STEMI.    2.  Septic shock.  Still on IV pressors  3.  Acute kidney injury, improved  4.  Altered mental state, improving  5.  Hypokalemia, corrected  6.  Cardiac catheterization later on when she becomes more stable.

## 2023-05-27 NOTE — PROGRESS NOTES
Pulmonary / Critical Care Progress Note      Patient Name: Isha Llanes  : 1965  MRN: [id number]  Attending:  Mic Herrera MD   Date of admission: 2023    Subjective   Subjective   Patient critically ill with septic shock, multifocal pneumonia    Over past 24 hours:  Patient received IV fluid resuscitation  Required vasopressors for hypotension  Tmax 102  Lactic acid decreasing  Urine output 2900/24 hours  CT scan chest abdomen pelvis-showed multifocal pneumonia  Cultures with no growth to date  On antibiotics  Renal function improved to baseline  Platelets down  LFTs stable  Hep C antibody+  Core track in place  Received IV Ativan overnight for anxiety    Review of Systems  Constitutional symptoms: Very weak and fatigued  Ear, nose, throat: Denied complaints  Cardiovascular:  Denied complaints  Respiratory: Congested, nonproductive cough  Gastrointestinal: Denied complaints  Musculoskeletal: Denied complaints  Neurologic: Denied complaints  Skin: Denied complaints        Objective   Objective     Vitals:   Vital signs for last 24 hours:  Temp:  [98.3 °F (36.8 °C)-102 °F (38.9 °C)] 100.6 °F (38.1 °C)  Heart Rate:  [] 98  BP: ()/() 89/65    Intake/Output last 3 shifts:  I/O last 3 completed shifts:  In: 4340.9 [I.V.:1706.9; NG/GT:274; IV Piggyback:2360]  Out: 2900 [Urine:2900]  Intake/Output this shift:  No intake/output data recorded.    Vent settings for last 24 hours:       Hemodynamic parameters for last 24 hours:       Physical Exam   Vital Signs Reviewed   General: Chronically ill-appearing, acutely ill appearing, awake, no acute distress  HEENT:  PERRL, EOMI.  OP, nares clear  Chest:  good aeration, coarse rhonchi to auscultation bilaterally, tympanic to percussion bilaterally, no work of breathing noted  CV: RRR, no MGR, pulses 2+, equal.  Abd:  Soft, NT, ND, + BS, no HSM  EXT:  no clubbing, no cyanosis, no edema  Neuro:  A&Ox2, CN grossly intact, no focal  deficits.  Skin: No rashes or lesions noted  Core track in place      Result Review    Result Review:  I have personally reviewed the results from the time of this admission to 5/27/2023 08:24 EDT and agree with these findings:  [x]  Laboratory  [x]  Microbiology  [x]  Radiology  []  EKG/Telemetry   [x]  Cardiology/Vascular   []  Pathology  []  Old records  []  Other:  Most notable findings include:       Lab 05/27/23  0613 05/26/23  0708 05/26/23  0142   WBC 19.09*  --  10.29   HEMOGLOBIN 11.6*  --  15.1   HEMATOCRIT 35.1  --  46.4   PLATELETS 50*  --  95*   SODIUM 138 136 137   SODIUM, ARTERIAL  --   --  134.6*   POTASSIUM 3.8 3.5 3.4*   CHLORIDE 107 103 96*   CO2 22.7 17.9* 19.0*   BUN 19 30* 25*   CREATININE 0.57 1.32* 2.07*   GLUCOSE 116* 168* 172*   GLUCOSE, ARTERIAL  --   --  181*   CALCIUM 7.7* 8.0* 9.6   PHOSPHORUS 3.1  --   --    TOTAL PROTEIN 5.8*  --  7.2   ALBUMIN 2.9*  --  3.8   GLOBULIN 2.9  --  3.4     CT Abdomen Pelvis Without Contrast    Result Date: 5/26/2023    1. Hepatic steatosis. 2. Nonobstructing right renal stone. 3. Mild pelvic ascites.     MORALES TESFAYE MD       Electronically Signed and Approved By: MORALES TESFAYE MD on 5/26/2023 at 17:55             CT Head Without Contrast    Result Date: 5/26/2023   No acute brain abnormality is seen. Specifically, no acute intracranial hemorrhage, no acute infarct, no significant intracranial mass lesion, and no acute intracranial mass effect are appreciated.   The study is motion-limited.    Please note that portions of this note were completed with a voice recognition program.  JEN JONES JR, MD       Electronically Signed and Approved By: JEN JONES JR, MD on 5/26/2023 at 2:22              CT Chest Without Contrast Diagnostic    Result Date: 5/26/2023    1. Patchy bilateral consolidations worse in the left upper lobe, suggesting multifocal pneumonia. 2. Small right pleural effusion with bibasilar atelectasis. 3. Moderate emphysema.      MORALES TESFAYE MD       Electronically Signed and Approved By: MORALES TESFAYE MD on 5/26/2023 at 17:50             XR Chest 1 View    Result Date: 5/26/2023    No acute infiltrate is appreciated.    Please note that portions of this note were completed with a voice recognition program.  JEN JONES JR, MD       Electronically Signed and Approved By: JEN JONES JR, MD on 5/26/2023 at 2:27                  Assessment & Plan   Assessment / Plan     Active Hospital Problems:  Active Hospital Problems    Diagnosis    • **Septic shock      Impression:  Septic shock, present on admission  Multifocal pneumonia from unspecified organism  Altered mental status  Toxic/metabolic encephalopathy  Anion gap metabolic acidosis  Lactic acidosis, clinically significant  Acute kidney injury, prerenal  Hypotension  Hypokalemia  Hypomagnesia   Thrombocytopenia  Transaminitis  Hep C antibody positive  Type 2 diabetes with hyperglycemia  Recent diagnosis of throat cancer  History of lymphoma  NSTEMI, likely demand type II  Severe aortic stenosis status post TAVR     Plan:  Patient received IV fluid resuscitation per sepsis guidelines, Lactic acid decreasing  Vasopressin and levo on standby BP marginal, will add midodrine 5 mg 3 times daily , Use Levophed to maintain MAP greater than 65 if needed   CT scan of chest reviewed does have multifocal pneumonia  Check respiratory viral panel, send sputum culture if produces specimen  Urine antigens, MRSA PCR negative  We will de-escalate antibiotics to Rocephin.  Day 2/7  We will recheck Pro-Ge tomorrow  Continue supplemental O2 wean for SPO2 greater than 90  Add bronchopulmonary hygiene, incentive spirometry, flutter valve  Continue aspiration precautions  Echocardiogram reviewed-> EF 46 to 50%, tricuspid regurg, LV mild hypertrophy  proBNP at 12,000, will stop IV fluids at this time.  Consider diuresing in the upcoming days  Trend renal function, monitor replace electrolytes  Okay  to discontinue Keating catheter  Replace potassium per NG and calcium IV  Core track in place, okay to initiate tube feeds  Patient slightly more awake today, okay to evaluate speech and swallow and advance diet as well  Hep C antibody was positive, will check hepatitis C PCR blood  Continue Levemir, and SSI for optimal glucose control  Trend LFTs  Follow platelets.  Thrombocytopenia is likely related to septic shock, drug-induced from antibiotics  Okay to get up to chair   DVT prophylaxis: Pharmacy to dose Lovenox  No DVT prophylaxis order currently exists.    CODE STATUS:   Level Of Support Discussed With: Next of Kin (If No Surrogate)  Code Status (Patient has no pulse and is not breathing): CPR (Attempt to Resuscitate)  Medical Interventions (Patient has pulse or is breathing): Full Support  Comments: daughter at bedside confirmed code status      The patient is critically ill in the ICU with septic shock, multifocal pneumonia, acute kidney injury, altered mental status, multiple electrolyte disturbances, NSTEMI. Multidisciplinary bedside critical care rounds were performed with nursing staff, respiratory therapy, pharmacy, nutritional services, social work. I have personally reviewed the chart, labs and any pertinent imaging available.  We have spent 40 minutes of critical care time managing this patient.  Of this, I have spent 22 minutes of critical care time, excluding procedures, in the care of this patient in accordance with split shared billing.     I, LUZ Friedman, spent 18 minutes critical care time in accordance to split shared billing.    Electronically signed by Enzo Segal MD, 05/27/23, 12:52 PM EDT.

## 2023-05-27 NOTE — CONSULTS
"Nutrition Services    Patient Name: Isha Llanes  YOB: 1965  MRN: [id number]  Admission date: 5/26/2023      CLINICAL NUTRITION ASSESSMENT      Reason for Assessment  Follow-up protocol, EN   H&P:    Past Medical History:   Diagnosis Date   • Arthritis    • Asthma    • Cancer    • COPD (chronic obstructive pulmonary disease)    • Coronary artery disease    • Diabetes mellitus    • GERD (gastroesophageal reflux disease)    • History of transfusion    • Hypertension    • Sleep apnea    • Stroke         Current Problems:   Active Hospital Problems    Diagnosis    • **Septic shock         Nutrition/Diet History         Narrative     Nutrition follow up. Admitted with septic shock and AMS. Cortrak placement on 5/26 and patient began EN. Labs review, electrolytes WDL. No documented or reported intolerances at this time. RD will CTM per protocol.      Anthropometrics        Current Height, Weight Height: 162.6 cm (64\")  Weight: 73.4 kg (161 lb 13.1 oz)   Current BMI Body mass index is 27.78 kg/m².       Weight Hx  Wt Readings from Last 30 Encounters:   05/26/23 0628 73.4 kg (161 lb 13.1 oz)   05/26/23 0121 70.8 kg (156 lb 1.4 oz)            Wt Change Observation GI     Estimated/Assessed Needs       Energy Requirements 25-30 kcal/kg adj BW   EST Needs (kcal/day) 0752-1746 kcal       Protein Requirements 0.8-1.0 g/kg adj BW   EST Daily Needs (g/day) 47-59 g       Fluid Requirements 1 ml/kcal    Estimated Needs (mL/day) 0914-7676 ml     Labs/Medications         Pertinent Labs Reviewed.   Results from last 7 days   Lab Units 05/27/23  0613 05/26/23  0708 05/26/23  0142   SODIUM mmol/L 138 136 137   SODIUM, ARTERIAL mmol/L  --   --  134.6*   POTASSIUM mmol/L 3.8 3.5 3.4*   CHLORIDE mmol/L 107 103 96*   CO2 mmol/L 22.7 17.9* 19.0*   BUN mg/dL 19 30* 25*   CREATININE mg/dL 0.57 1.32* 2.07*   CALCIUM mg/dL 7.7* 8.0* 9.6   BILIRUBIN mg/dL 0.6  --  1.4*   ALK PHOS U/L 89  --  129*   ALT (SGPT) U/L 132*  --  99* "   AST (SGOT) U/L 177*  --  192*   GLUCOSE mg/dL 116* 168* 172*   GLUCOSE, ARTERIAL mg/dL  --   --  181*     Results from last 7 days   Lab Units 05/27/23  0613 05/26/23  0708   MAGNESIUM mg/dL 2.4 1.4*   PHOSPHORUS mg/dL 3.1  --    HEMOGLOBIN g/dL 11.6*  --    HEMATOCRIT % 35.1  --      No results found for: COVID19  No results found for: HGBA1C      Pertinent Medications Reviewed.     Current Nutrition Orders & Evaluation of Intake       Oral Nutrition     Current PO Diet NPO Diet NPO Type: Strict NPO   Supplement Orders Placed This Encounter      Place Feeding Tube Per [facility] System      Diet, Tube Feeding Tube Feeding Formula: Yuridia JOHNSON; Tube Feeding Type: Continuous; Continuous Tube Feeding Start Rate (mL/hr): 25; Then Advance Rate By (mL/hr): 25; Every __ Hours: 4; To Goal Rate of (mL/hr): 55       Malnutrition Severity Assessment                Nutrition Diagnosis         Nutrition Dx Problem 1 Inadequate energy Intake related to decreased ability to consume sufficient energy as evidenced by NPO     Nutrition Intervention         Yuridia AC @ 55 ml/hr  FWF 65 q3h  Provides: 1584 kcal, 79 g pro, 1602 ml fluid     Medical Nutrition Therapy/Nutrition Education          Learner     Readiness Patient  Education not appropriate at this time     Method     Response N/A  N/A     Monitor/Evaluation        Monitor Per protocol, Pertinent labs, EN delivery/tolerance, POC/GOC     Nutrition Discharge Plan         To be determined     Electronically signed by:  Meredith Garcia RD  05/27/23 09:02 EDT

## 2023-05-27 NOTE — PROGRESS NOTES
Respiratory Therapist Broncho-Pulmonary Hygiene Progress Note      Patient Name:  Isha Llanes  YOB: 1965    Isha Llanes meets the qualification for Level 2 of the Bronco-Pulmonary Hygiene Protocol. This was based on my daily patient assessment and includes review of chest x-ray results, cough ability and quality, oxygenation, secretions or risk for secretion development and patient mobility.     Broncho-Pulmonary Hygiene Assessment:    Level of Movement: Low level of mobility  Lethargic uncoorperative    Breath Sounds: Clear to slightly diminished    Cough: Strong, effective and/or frequent    Chest X-Ray: Presence of atelectasis and/or consolidation    Sputum Productions: None or small amount of thin or watery secretions with effective cough    History and Physical: New onset of bronchitis or existing chronic pulmonary conditions.  **(not in an exacerbation)    SpO2 to Oxygen Need: greater than 92% on 4-6L nasal canula    Current SpO2 is: 100 on 4LNC    Based on this information, I have completed the following interventions: Aerobika with bronchodialtor medication or TID      Electronically signed by Nhi Anand RRT, 05/27/23, 9:52 AM EDT.

## 2023-05-28 ENCOUNTER — APPOINTMENT (OUTPATIENT)
Dept: GENERAL RADIOLOGY | Facility: HOSPITAL | Age: 58
DRG: 871 | End: 2023-05-28
Payer: COMMERCIAL

## 2023-05-28 LAB
ALBUMIN SERPL-MCNC: 2.5 G/DL (ref 3.5–5.2)
ALBUMIN/GLOB SERPL: 0.8 G/DL
ALP SERPL-CCNC: 93 U/L (ref 39–117)
ALT SERPL W P-5'-P-CCNC: 102 U/L (ref 1–33)
AMMONIA BLD-SCNC: 31 UMOL/L (ref 11–51)
ANION GAP SERPL CALCULATED.3IONS-SCNC: 10.3 MMOL/L (ref 5–15)
APTT PPP: 34.6 SECONDS (ref 78–95.9)
AST SERPL-CCNC: 91 U/L (ref 1–32)
BACTERIA SPEC AEROBE CULT: ABNORMAL
BACTERIA SPEC RESP CULT: NORMAL
BACTERIA UR QL AUTO: ABNORMAL /HPF
BILIRUB SERPL-MCNC: 0.6 MG/DL (ref 0–1.2)
BILIRUB UR QL STRIP: NEGATIVE
BUN SERPL-MCNC: 14 MG/DL (ref 6–20)
BUN/CREAT SERPL: 28.6 (ref 7–25)
CALCIUM SPEC-SCNC: 8 MG/DL (ref 8.6–10.5)
CHLORIDE SERPL-SCNC: 106 MMOL/L (ref 98–107)
CLARITY UR: CLEAR
CO2 SERPL-SCNC: 21.7 MMOL/L (ref 22–29)
COLOR UR: YELLOW
CREAT SERPL-MCNC: 0.49 MG/DL (ref 0.57–1)
DEPRECATED RDW RBC AUTO: 48.6 FL (ref 37–54)
EGFRCR SERPLBLD CKD-EPI 2021: 110.1 ML/MIN/1.73
ERYTHROCYTE [DISTWIDTH] IN BLOOD BY AUTOMATED COUNT: 15 % (ref 12.3–15.4)
GLOBULIN UR ELPH-MCNC: 3.1 GM/DL
GLUCOSE BLDC GLUCOMTR-MCNC: 109 MG/DL (ref 70–99)
GLUCOSE BLDC GLUCOMTR-MCNC: 116 MG/DL (ref 70–99)
GLUCOSE BLDC GLUCOMTR-MCNC: 162 MG/DL (ref 70–99)
GLUCOSE BLDC GLUCOMTR-MCNC: 65 MG/DL (ref 70–99)
GLUCOSE BLDC GLUCOMTR-MCNC: 81 MG/DL (ref 70–99)
GLUCOSE BLDC GLUCOMTR-MCNC: 90 MG/DL (ref 70–99)
GLUCOSE SERPL-MCNC: 163 MG/DL (ref 65–99)
GLUCOSE UR STRIP-MCNC: ABNORMAL MG/DL
GRAM STN SPEC: ABNORMAL
GRAM STN SPEC: ABNORMAL
GRAM STN SPEC: NORMAL
HCT VFR BLD AUTO: 35.2 % (ref 34–46.6)
HGB BLD-MCNC: 10.9 G/DL (ref 12–15.9)
HGB UR QL STRIP.AUTO: ABNORMAL
HYALINE CASTS UR QL AUTO: ABNORMAL /LPF
KETONES UR QL STRIP: NEGATIVE
LEUKOCYTE ESTERASE UR QL STRIP.AUTO: NEGATIVE
MAGNESIUM SERPL-MCNC: 2.2 MG/DL (ref 1.6–2.6)
MCH RBC QN AUTO: 27.3 PG (ref 26.6–33)
MCHC RBC AUTO-ENTMCNC: 31 G/DL (ref 31.5–35.7)
MCV RBC AUTO: 88 FL (ref 79–97)
NITRITE UR QL STRIP: NEGATIVE
PH UR STRIP.AUTO: 5.5 [PH] (ref 5–8)
PHOSPHATE SERPL-MCNC: 1.7 MG/DL (ref 2.5–4.5)
PLATELET # BLD AUTO: 48 10*3/MM3 (ref 140–450)
PMV BLD AUTO: 12.6 FL (ref 6–12)
POTASSIUM SERPL-SCNC: 3.8 MMOL/L (ref 3.5–5.2)
PROCALCITONIN SERPL-MCNC: >100 NG/ML (ref 0–0.25)
PROT SERPL-MCNC: 5.6 G/DL (ref 6–8.5)
PROT UR QL STRIP: ABNORMAL
QT INTERVAL: 396 MS
RBC # BLD AUTO: 4 10*6/MM3 (ref 3.77–5.28)
RBC # UR STRIP: ABNORMAL /HPF
REF LAB TEST METHOD: ABNORMAL
SODIUM SERPL-SCNC: 138 MMOL/L (ref 136–145)
SP GR UR STRIP: 1.02 (ref 1–1.03)
SQUAMOUS #/AREA URNS HPF: ABNORMAL /HPF
UROBILINOGEN UR QL STRIP: ABNORMAL
VANCOMYCIN TROUGH SERPL-MCNC: <4 MCG/ML (ref 5–20)
WBC # UR STRIP: ABNORMAL /HPF
WBC NRBC COR # BLD: 16.33 10*3/MM3 (ref 3.4–10.8)

## 2023-05-28 PROCEDURE — 25010000002 ENOXAPARIN PER 10 MG: Performed by: NURSE PRACTITIONER

## 2023-05-28 PROCEDURE — 25010000002 HALOPERIDOL LACTATE PER 5 MG: Performed by: FAMILY MEDICINE

## 2023-05-28 PROCEDURE — 84145 PROCALCITONIN (PCT): CPT | Performed by: NURSE PRACTITIONER

## 2023-05-28 PROCEDURE — 94640 AIRWAY INHALATION TREATMENT: CPT

## 2023-05-28 PROCEDURE — 71045 X-RAY EXAM CHEST 1 VIEW: CPT

## 2023-05-28 PROCEDURE — 87205 SMEAR GRAM STAIN: CPT | Performed by: NURSE PRACTITIONER

## 2023-05-28 PROCEDURE — 83735 ASSAY OF MAGNESIUM: CPT | Performed by: FAMILY MEDICINE

## 2023-05-28 PROCEDURE — 94799 UNLISTED PULMONARY SVC/PX: CPT

## 2023-05-28 PROCEDURE — 93005 ELECTROCARDIOGRAM TRACING: CPT | Performed by: FAMILY MEDICINE

## 2023-05-28 PROCEDURE — 85730 THROMBOPLASTIN TIME PARTIAL: CPT | Performed by: FAMILY MEDICINE

## 2023-05-28 PROCEDURE — 25010000002 ONDANSETRON PER 1 MG: Performed by: FAMILY MEDICINE

## 2023-05-28 PROCEDURE — 84100 ASSAY OF PHOSPHORUS: CPT | Performed by: NURSE PRACTITIONER

## 2023-05-28 PROCEDURE — 82948 REAGENT STRIP/BLOOD GLUCOSE: CPT

## 2023-05-28 PROCEDURE — 25010000002 FUROSEMIDE PER 20 MG: Performed by: NURSE PRACTITIONER

## 2023-05-28 PROCEDURE — 99233 SBSQ HOSP IP/OBS HIGH 50: CPT | Performed by: FAMILY MEDICINE

## 2023-05-28 PROCEDURE — 85027 COMPLETE CBC AUTOMATED: CPT | Performed by: NURSE PRACTITIONER

## 2023-05-28 PROCEDURE — 80053 COMPREHEN METABOLIC PANEL: CPT | Performed by: NURSE PRACTITIONER

## 2023-05-28 PROCEDURE — 25010000002 HYDROMORPHONE 1 MG/ML SOLUTION: Performed by: FAMILY MEDICINE

## 2023-05-28 PROCEDURE — 25010000002 FUROSEMIDE PER 20 MG: Performed by: FAMILY MEDICINE

## 2023-05-28 PROCEDURE — 25010000002 CEFAZOLIN IN DEXTROSE 2-4 GM/100ML-% SOLUTION: Performed by: INTERNAL MEDICINE

## 2023-05-28 PROCEDURE — 81001 URINALYSIS AUTO W/SCOPE: CPT | Performed by: FAMILY MEDICINE

## 2023-05-28 PROCEDURE — 92610 EVALUATE SWALLOWING FUNCTION: CPT

## 2023-05-28 PROCEDURE — 80202 ASSAY OF VANCOMYCIN: CPT | Performed by: FAMILY MEDICINE

## 2023-05-28 PROCEDURE — 99291 CRITICAL CARE FIRST HOUR: CPT | Performed by: INTERNAL MEDICINE

## 2023-05-28 PROCEDURE — 82140 ASSAY OF AMMONIA: CPT | Performed by: FAMILY MEDICINE

## 2023-05-28 RX ORDER — LORAZEPAM 2 MG/ML
0.5 CONCENTRATE ORAL EVERY 6 HOURS PRN
Status: DISCONTINUED | OUTPATIENT
Start: 2023-05-28 | End: 2023-05-29

## 2023-05-28 RX ORDER — GABAPENTIN 100 MG/1
100 CAPSULE ORAL EVERY 8 HOURS SCHEDULED
Status: DISCONTINUED | OUTPATIENT
Start: 2023-05-28 | End: 2023-05-29

## 2023-05-28 RX ORDER — CEFAZOLIN SODIUM 2 G/100ML
2 INJECTION, SOLUTION INTRAVENOUS EVERY 8 HOURS
Status: DISCONTINUED | OUTPATIENT
Start: 2023-05-28 | End: 2023-05-29

## 2023-05-28 RX ORDER — DIPHENHYDRAMINE HYDROCHLORIDE AND LIDOCAINE HYDROCHLORIDE AND ALUMINUM HYDROXIDE AND MAGNESIUM HYDRO
5 KIT EVERY 4 HOURS PRN
Status: DISCONTINUED | OUTPATIENT
Start: 2023-05-28 | End: 2023-05-28

## 2023-05-28 RX ORDER — DIPHENHYDRAMINE HYDROCHLORIDE AND LIDOCAINE HYDROCHLORIDE AND ALUMINUM HYDROXIDE AND MAGNESIUM HYDRO
5 KIT EVERY 4 HOURS PRN
Status: DISCONTINUED | OUTPATIENT
Start: 2023-05-28 | End: 2023-05-31 | Stop reason: HOSPADM

## 2023-05-28 RX ORDER — ALBUTEROL SULFATE 2.5 MG/3ML
2.5 SOLUTION RESPIRATORY (INHALATION) EVERY 6 HOURS PRN
Status: DISCONTINUED | OUTPATIENT
Start: 2023-05-28 | End: 2023-05-31 | Stop reason: HOSPADM

## 2023-05-28 RX ORDER — POTASSIUM CHLORIDE 1.5 G/1.77G
40 POWDER, FOR SOLUTION ORAL ONCE
Status: COMPLETED | OUTPATIENT
Start: 2023-05-28 | End: 2023-05-28

## 2023-05-28 RX ORDER — CHOLECALCIFEROL (VITAMIN D3) 125 MCG
10 CAPSULE ORAL NIGHTLY PRN
Status: DISCONTINUED | OUTPATIENT
Start: 2023-05-28 | End: 2023-05-29

## 2023-05-28 RX ORDER — LOPERAMIDE HYDROCHLORIDE 2 MG/1
2 CAPSULE ORAL 4 TIMES DAILY PRN
Status: DISCONTINUED | OUTPATIENT
Start: 2023-05-28 | End: 2023-05-29

## 2023-05-28 RX ORDER — FUROSEMIDE 10 MG/ML
40 INJECTION INTRAMUSCULAR; INTRAVENOUS EVERY 12 HOURS
Status: DISCONTINUED | OUTPATIENT
Start: 2023-05-28 | End: 2023-05-28

## 2023-05-28 RX ORDER — FENTANYL/ROPIVACAINE/NS/PF 2-625MCG/1
15 PLASTIC BAG, INJECTION (ML) EPIDURAL
Status: COMPLETED | OUTPATIENT
Start: 2023-05-28 | End: 2023-05-28

## 2023-05-28 RX ORDER — MIDODRINE HYDROCHLORIDE 10 MG/1
10 TABLET ORAL
Status: DISCONTINUED | OUTPATIENT
Start: 2023-05-28 | End: 2023-05-29

## 2023-05-28 RX ORDER — FUROSEMIDE 10 MG/ML
40 INJECTION INTRAMUSCULAR; INTRAVENOUS
Status: DISCONTINUED | OUTPATIENT
Start: 2023-05-28 | End: 2023-05-31

## 2023-05-28 RX ORDER — SACCHAROMYCES BOULARDII 250 MG
250 CAPSULE ORAL 2 TIMES DAILY
Status: DISCONTINUED | OUTPATIENT
Start: 2023-05-28 | End: 2023-05-29

## 2023-05-28 RX ADMIN — GABAPENTIN 100 MG: 100 CAPSULE ORAL at 13:46

## 2023-05-28 RX ADMIN — FUROSEMIDE 40 MG: 10 INJECTION, SOLUTION INTRAMUSCULAR; INTRAVENOUS at 17:32

## 2023-05-28 RX ADMIN — LORAZEPAM 0.5 MG: 2 SOLUTION, CONCENTRATE ORAL at 12:16

## 2023-05-28 RX ADMIN — Medication 10 ML: at 20:35

## 2023-05-28 RX ADMIN — ALBUTEROL SULFATE 2.5 MG: 2.5 SOLUTION RESPIRATORY (INHALATION) at 06:29

## 2023-05-28 RX ADMIN — CLOPIDOGREL BISULFATE 75 MG: 75 TABLET ORAL at 09:17

## 2023-05-28 RX ADMIN — MIDODRINE HYDROCHLORIDE 5 MG: 5 TABLET ORAL at 09:09

## 2023-05-28 RX ADMIN — ONDANSETRON 4 MG: 2 INJECTION INTRAMUSCULAR; INTRAVENOUS at 09:07

## 2023-05-28 RX ADMIN — LOPERAMIDE HYDROCHLORIDE 2 MG: 2 CAPSULE ORAL at 10:36

## 2023-05-28 RX ADMIN — LORAZEPAM 0.5 MG: 2 SOLUTION, CONCENTRATE ORAL at 20:37

## 2023-05-28 RX ADMIN — POTASSIUM CHLORIDE FOR ORAL SOLUTION 40 MEQ: 1.5 POWDER, FOR SOLUTION ORAL at 09:17

## 2023-05-28 RX ADMIN — ALBUTEROL SULFATE 2.5 MG: 2.5 SOLUTION RESPIRATORY (INHALATION) at 01:41

## 2023-05-28 RX ADMIN — ENOXAPARIN SODIUM 40 MG: 100 INJECTION SUBCUTANEOUS at 09:09

## 2023-05-28 RX ADMIN — HYDROMORPHONE HYDROCHLORIDE 0.5 MG: 1 INJECTION, SOLUTION INTRAMUSCULAR; INTRAVENOUS; SUBCUTANEOUS at 20:34

## 2023-05-28 RX ADMIN — NICOTINE 1 PATCH: 21 PATCH, EXTENDED RELEASE TRANSDERMAL at 09:09

## 2023-05-28 RX ADMIN — ATORVASTATIN CALCIUM 80 MG: 40 TABLET, FILM COATED ORAL at 20:34

## 2023-05-28 RX ADMIN — GABAPENTIN 100 MG: 100 CAPSULE ORAL at 22:21

## 2023-05-28 RX ADMIN — Medication 250 MG: at 09:09

## 2023-05-28 RX ADMIN — POTASSIUM PHOSPHATE, MONOBASIC AND POTASSIUM PHOSPHATE, DIBASIC 15 MMOL: 224; 236 INJECTION, SOLUTION, CONCENTRATE INTRAVENOUS at 13:47

## 2023-05-28 RX ADMIN — MIDODRINE HYDROCHLORIDE 10 MG: 5 TABLET ORAL at 12:16

## 2023-05-28 RX ADMIN — Medication 250 MG: at 20:38

## 2023-05-28 RX ADMIN — DEXTROSE MONOHYDRATE 25 G: 25 INJECTION, SOLUTION INTRAVENOUS at 05:32

## 2023-05-28 RX ADMIN — ONDANSETRON 4 MG: 2 INJECTION INTRAMUSCULAR; INTRAVENOUS at 20:35

## 2023-05-28 RX ADMIN — CEFAZOLIN SODIUM 2 G: 2 INJECTION, SOLUTION INTRAVENOUS at 09:56

## 2023-05-28 RX ADMIN — HYDROMORPHONE HYDROCHLORIDE 0.5 MG: 1 INJECTION, SOLUTION INTRAMUSCULAR; INTRAVENOUS; SUBCUTANEOUS at 07:27

## 2023-05-28 RX ADMIN — ONDANSETRON 4 MG: 2 INJECTION INTRAMUSCULAR; INTRAVENOUS at 15:24

## 2023-05-28 RX ADMIN — HYDROMORPHONE HYDROCHLORIDE 0.5 MG: 1 INJECTION, SOLUTION INTRAMUSCULAR; INTRAVENOUS; SUBCUTANEOUS at 22:37

## 2023-05-28 RX ADMIN — HYDROMORPHONE HYDROCHLORIDE 0.5 MG: 1 INJECTION, SOLUTION INTRAMUSCULAR; INTRAVENOUS; SUBCUTANEOUS at 12:16

## 2023-05-28 RX ADMIN — HYDROMORPHONE HYDROCHLORIDE 0.5 MG: 1 INJECTION, SOLUTION INTRAMUSCULAR; INTRAVENOUS; SUBCUTANEOUS at 14:53

## 2023-05-28 RX ADMIN — LOPERAMIDE HYDROCHLORIDE 2 MG: 2 CAPSULE ORAL at 20:34

## 2023-05-28 RX ADMIN — Medication 0.1 MCG/KG/MIN: at 04:10

## 2023-05-28 RX ADMIN — FUROSEMIDE 40 MG: 10 INJECTION, SOLUTION INTRAMUSCULAR; INTRAVENOUS at 09:17

## 2023-05-28 RX ADMIN — POTASSIUM PHOSPHATE, MONOBASIC AND POTASSIUM PHOSPHATE, DIBASIC 15 MMOL: 224; 236 INJECTION, SOLUTION, CONCENTRATE INTRAVENOUS at 09:56

## 2023-05-28 RX ADMIN — MIDODRINE HYDROCHLORIDE 10 MG: 5 TABLET ORAL at 16:35

## 2023-05-28 RX ADMIN — HYDROMORPHONE HYDROCHLORIDE 0.5 MG: 1 INJECTION, SOLUTION INTRAMUSCULAR; INTRAVENOUS; SUBCUTANEOUS at 04:08

## 2023-05-28 RX ADMIN — Medication 10 ML: at 09:18

## 2023-05-28 RX ADMIN — HALOPERIDOL LACTATE 2 MG: 5 INJECTION, SOLUTION INTRAMUSCULAR at 23:53

## 2023-05-28 RX ADMIN — ONDANSETRON 4 MG: 2 INJECTION INTRAMUSCULAR; INTRAVENOUS at 02:18

## 2023-05-28 RX ADMIN — HYDROMORPHONE HYDROCHLORIDE 0.5 MG: 1 INJECTION, SOLUTION INTRAMUSCULAR; INTRAVENOUS; SUBCUTANEOUS at 09:24

## 2023-05-28 RX ADMIN — CEFAZOLIN SODIUM 2 G: 2 INJECTION, SOLUTION INTRAVENOUS at 17:32

## 2023-05-28 NOTE — PROGRESS NOTES
[facility]   Hospitalist Progress Note  Date: 2023  Patient Name: Isha Llanes  : 1965  MRN: [id number]  Date of admission: 2023      Subjective   Subjective     Chief Complaint: Follow-up altered mental status    Summary:Isha Llanes is a 57 y.o. female   history of aortic stenosis status post TAVR, Hodgkin's lymphoma, recently diagnosed throat cancer, lupus, chronic back pain post intrathecal pain pump implant brought into the emergency department for evaluation of altered mental status.    Patient found unconscious in her kitchen.  She was slumped over in her wheelchair upon arrival.  Patient is wheelchair-bound at baseline. EMS was called, was transported to emergency department for evaluation.  Daughter reports that the patient had defecated on herself at the time of being found.  Daughter reports that the patient has a left upper chest wall Mediport, and history of persistently positive blood cultures, requiring frequent IV antibiotic infusions.  She currently the process of getting the Mediport removed as it is inaccessible.  Last known normal was the patient was able for presentation.  In the emergency department patient found to be initially afebrile developed a temperature of 102, sinus tach 90s to 100 on telemetry review, hypotensive 80s over 50s, requiring 2 L nasal cannula keep sats greater than 90%.  ABG showed respiratory compensation of metabolic acidosis, troponin elevated greater than 300 initially.  EKG negative for acute ischemic changes.  Redemonstrated chronic left bundle branch block.  Creatinine elevated 2.07, lactate greater than 9, ALT AST mildly elevated Pro-Ge greater than 400.  Urinalysis negative for pyuria.  Tox screen positive for opioids.  Patient does have a Dilaudid pain pump.  CT head negative for any acute intracranial abnormalities.  CT abdomen pelvis demonstrated hepatic steatosis nonobstructive right renal stone and mild pelvic ascites.  CT  chest demonstrated patchy bilateral consolidations worse in the left upper lobe concerning for multifocal pneumonia.  Also noted to have a small right pleural effusion and moderate emphysema.  Patient bolused IV fluids started on empiric antibiotics after cultures were drawn.  Patient admitted to ICU for further evaluation and treatment of septic shock thought secondary to multifocal pneumonia with associated encephalopathy, acute kidney injury and NSTEMI.  Pulmonology critical care consulted.  Continued on empiric antibiotics.  Despite volume resuscitation blood pressure remained low required initiation of Levophed and vasopressin.    Interval Followup: Patient lying in bed appears to be resting comfortably.  Patient more alert oriented today.  Patient continues to complain of back pain as well as nausea.  Still having issues with diarrhea overnight per nursing. Patient afebrile overnight.  Sinus rhythm 80s to 100s on telemetry review.  Blood pressure remains low still requiring Levophed the nursing able to titrate down per protocol.  Titrating up midodrine.  Requiring 6 L nasal cannula to keep sats greater than 90% overnight.  Chest x-ray showed worsening bilateral infiltrates with likely pulmonary edema.  Blood sugars low overnight responded to hypoglycemia protocol.  Creatinine remains at baseline.  LFTs remain mildly elevate though trending down.  White blood cell count trending down.  Pro-Ge remains profoundly elevated though trending down.  Platelet count trending down slightly.  Hep C viral load pending.  Wound culture growing MSSA.  Respiratory panel negative.  Blood cultures negative to date.  No other issues per nursing.    Review of Systems  Constitutional: Positive for fatigue and negative fever.   HENT: Negative for sore throat and trouble swallowing.    Eyes: Negative for pain and discharge.   Respiratory: Positive for cough and shortness of breath.    Cardiovascular: Negative for chest pain and  palpitations.   Gastrointestinal: Negative for abdominal pain, positive nausea and vomiting.   Endocrine: Negative for cold intolerance and heat intolerance.   Genitourinary: Negative for difficulty urinating and dysuria.   Musculoskeletal: Negative for back pain and neck stiffness.   Skin: Positive for venous stasis ulcer  Neurological: Negative for syncope and headaches.   Hematological: Negative for adenopathy.   Psychiatric/Behavioral: Negative for confusion and negative hallucinations.    Objective   Objective     Vitals:   Temp:  [97.8 °F (36.6 °C)-98.9 °F (37.2 °C)] 97.8 °F (36.6 °C)  Heart Rate:  [] 95  Resp:  [23-32] 23  BP: ()/() 108/66  Flow (L/min):  [1-6] 1  Physical Exam   Gen. well-developed appearing stated age in no acute distress  HEENT: Normocephalic atraumatic moist membranes pupils equal round reactive light, no scleral icterus no conjunctival injection  Cardiovascular: regular tachycardic, 1+ lower extremity edema appreciated  Pulmonary: Crackles bilateral posterior lung fields, no wheezes rales or rhonchi symmetric chest expansion, unlabored, no conversational dyspnea appreciated  Gastrointestinal: Soft nontender nondistended positive bowel sounds all 4 quadrants no rebound or guarding  Musculoskeletal: No clubbing cyanosis, warm and well-perfused, calves soft symmetric nontender bilaterally  Skin: Clean dry chronic skin changes bilateral lower extremities, venous stasis ulcer of the right calf  Neuro: Cranial nerves II through XII intact grossly no sensorimotor deficits appreciated bilateral upper and lower extremities  Psych: Patient is sleepy though able to wake up and is cooperative and appropriate with exam not responding to internal stimuli  : No Keating catheter no bladder distention no suprapubic tenderness    Result Review    Result Review:  I have personally reviewed these results and agree with these findings:  [x]  Laboratory  LAB RESULTS:      Lab 05/28/23  1042  05/28/23  0829 05/28/23  0808 05/27/23  0613 05/27/23  0102 05/26/23  1828 05/26/23  1200 05/26/23  0708 05/26/23  0455 05/26/23  0142   WBC  --  16.33*  --  19.09*  --   --   --   --   --  10.29   HEMOGLOBIN  --  10.9*  --  11.6*  --   --   --   --   --  15.1   HEMATOCRIT  --  35.2  --  35.1  --   --   --   --   --  46.4   PLATELETS  --  48*  --  50*  --   --   --   --   --  95*   NEUTROS ABS  --   --   --  18.14*  --   --   --   --   --  9.34*   IMMATURE GRANS (ABS)  --   --   --   --   --   --   --   --   --  0.11*   LYMPHS ABS  --   --   --   --   --   --   --   --   --  0.67*   MONOS ABS  --   --   --   --   --   --   --   --   --  0.10   EOS ABS  --   --   --   --   --   --   --   --   --  0.04   MCV  --  88.0  --  82.8  --   --   --   --   --  85.0   PROCALCITONIN  --   --  >100.00*  --   --   --   --   --   --  >400.00*   LACTATE  --   --   --   --  3.0* 3.7* 4.8* 3.1* 4.9* 9.6*   LACTATE, ARTERIAL  --   --   --   --   --   --   --   --   --  6.56*   PROTIME  --   --   --   --   --   --   --   --   --  17.1*   APTT 34.6*  --   --  41.3*  --   --   --  37.3*  --   --          Lab 05/28/23  0808 05/27/23 0613 05/26/23  0708 05/26/23 0142   SODIUM 138 138 136 137   SODIUM, ARTERIAL  --   --   --  134.6*   POTASSIUM 3.8 3.8 3.5 3.4*   CHLORIDE 106 107 103 96*   CO2 21.7* 22.7 17.9* 19.0*   ANION GAP 10.3 8.3 15.1* 22.0*   BUN 14 19 30* 25*   CREATININE 0.49* 0.57 1.32* 2.07*   EGFR 110.1 106.1 47.2* 27.5*   GLUCOSE 163* 116* 168* 172*   GLUCOSE, ARTERIAL  --   --   --  181*   CALCIUM 8.0* 7.7* 8.0* 9.6   IONIZED CALCIUM  --   --   --  1.11*   MAGNESIUM 2.2 2.4 1.4*  --    PHOSPHORUS 1.7* 3.1  --   --    TSH  --   --  1.100  --          Lab 05/28/23  0808 05/27/23  0613 05/26/23  1200 05/26/23  0142   TOTAL PROTEIN 5.6* 5.8*  --  7.2   ALBUMIN 2.5* 2.9*  --  3.8   GLOBULIN 3.1 2.9  --  3.4   ALT (SGPT) 102* 132*  --  99*   AST (SGOT) 91* 177*  --  192*   BILIRUBIN 0.6 0.6  --  1.4*   ALK PHOS 93 89  --  129*    LIPASE  --   --  8*  --          Lab 05/27/23  0613 05/26/23  0338 05/26/23  0142   PROBNP 12,931.0*  --   --    HSTROP T  --  256* 303*   PROTIME  --   --  17.1*   INR  --   --  1.40*                 Lab 05/26/23  0142   PH, ARTERIAL 7.429   PCO2, ARTERIAL 29.8*   PO2 ART 78.0*   O2 SATURATION ART 95.3   FIO2 28   HCO3 ART 19.3*   BASE EXCESS ART -3.7*   CARBOXYHEMOGLOBIN 2.0*     Brief Urine Lab Results  (Last result in the past 365 days)      Color   Clarity   Blood   Leuk Est   Nitrite   Protein   CREAT   Urine HCG        05/28/23 0405 Yellow   Clear   Small (1+)   Negative   Negative   30 mg/dL (1+)               Microbiology Results (last 10 days)     Procedure Component Value - Date/Time    Respiratory Culture - Sputum, Cough [[id number]] Collected: 05/28/23 0648    Lab Status: Final result Specimen: Sputum from Cough Updated: 05/28/23 0820     Respiratory Culture Rejected     Gram Stain Rare (1+) Epithelial cells seen      Rare (1+) WBCs seen      No organisms seen    Narrative:      Specimen rejected due to oropharyngeal contamination. Please reorder and recollect specimen if clinically necessary.    Respiratory Panel PCR w/COVID-19(SARS-CoV-2) TANIKA/MARY/MINNIE/PAD/COR/MAD/ELLEN In-House, NP Swab in UTM/VTM, 3-4 HR TAT - Swab, Nasopharynx [[id number]]  (Normal) Collected: 05/27/23 0925    Lab Status: Final result Specimen: Swab from Nasopharynx Updated: 05/27/23 1104     ADENOVIRUS, PCR Not Detected     Coronavirus 229E Not Detected     Coronavirus HKU1 Not Detected     Coronavirus NL63 Not Detected     Coronavirus OC43 Not Detected     COVID19 Not Detected     Human Metapneumovirus Not Detected     Human Rhinovirus/Enterovirus Not Detected     Influenza A PCR Not Detected     Influenza B PCR Not Detected     Parainfluenza Virus 1 Not Detected     Parainfluenza Virus 2 Not Detected     Parainfluenza Virus 3 Not Detected     Parainfluenza Virus 4 Not Detected     RSV, PCR Not Detected     Bordetella pertussis pcr  Not Detected     Bordetella parapertussis PCR Not Detected     Chlamydophila pneumoniae PCR Not Detected     Mycoplasma pneumo by PCR Not Detected    Narrative:      In the setting of a positive respiratory panel with a viral infection PLUS a negative procalcitonin without other underlying concern for bacterial infection, consider observing off antibiotics or discontinuation of antibiotics and continue supportive care. If the respiratory panel is positive for atypical bacterial infection (Bordetella pertussis, Chlamydophila pneumoniae, or Mycoplasma pneumoniae), consider antibiotic de-escalation to target atypical bacterial infection.    MRSA Screen, PCR (Inpatient) - Swab, Nares [[id number]]  (Normal) Collected: 05/26/23 1640    Lab Status: Final result Specimen: Swab from Nares Updated: 05/26/23 1808     MRSA PCR No MRSA Detected    Narrative:      The negative predictive value of this diagnostic test is high and should only be used to consider de-escalating anti-MRSA therapy. A positive result may indicate colonization with MRSA and must be correlated clinically.    Wound Culture - Swab, Leg, Right [[id number]]  (Abnormal)  (Susceptibility) Collected: 05/26/23 0305    Lab Status: Final result Specimen: Swab from Leg, Right Updated: 05/28/23 0848     Wound Culture Moderate growth (3+) Staphylococcus aureus     Gram Stain Many (4+) WBCs seen      Few (2+) Gram positive cocci in pairs    Susceptibility      Staphylococcus aureus      MARTHA      Clindamycin Susceptible      Erythromycin Susceptible      Inducible Clindamycin Resistance Negative      Oxacillin Susceptible      Rifampin Susceptible      Tetracycline Susceptible      Trimethoprim + Sulfamethoxazole Susceptible      Vancomycin Susceptible                       Susceptibility Comments     Staphylococcus aureus    This isolate does not demonstrate inducible clindamycin resistance in vitro.               S. Pneumo Ag Urine or CSF - Urine, Straight Cath  [[id number]]  (Normal) Collected: 05/26/23 0225    Lab Status: Final result Specimen: Urine from Straight Cath Updated: 05/26/23 1208     Strep Pneumo Ag Negative    Legionella Antigen, Urine - Urine, Straight Cath [[id number]]  (Normal) Collected: 05/26/23 0225    Lab Status: Final result Specimen: Urine from Straight Cath Updated: 05/26/23 1208     LEGIONELLA ANTIGEN, URINE Negative    Blood Culture - Blood, Arm, Left [[id number]]  (Normal) Collected: 05/26/23 0142    Lab Status: Preliminary result Specimen: Blood from Arm, Left Updated: 05/28/23 0201     Blood Culture No growth at 2 days    Blood Culture - Blood, Arm, Left [[id number]]  (Normal) Collected: 05/26/23 0142    Lab Status: Preliminary result Specimen: Blood from Arm, Left Updated: 05/28/23 0201     Blood Culture No growth at 2 days          [x]  Microbiology  [x]  Radiology  CT Abdomen Pelvis Without Contrast    Result Date: 5/26/2023    1. Hepatic steatosis. 2. Nonobstructing right renal stone. 3. Mild pelvic ascites.     MORALES TESFAYE MD       Electronically Signed and Approved By: MORALES TESFAYE MD on 5/26/2023 at 17:55             CT Head Without Contrast    Result Date: 5/26/2023   No acute brain abnormality is seen. Specifically, no acute intracranial hemorrhage, no acute infarct, no significant intracranial mass lesion, and no acute intracranial mass effect are appreciated.   The study is motion-limited.    Please note that portions of this note were completed with a voice recognition program.  JEN JONES JR, MD       Electronically Signed and Approved By: JEN JONES JR, MD on 5/26/2023 at 2:22              CT Chest Without Contrast Diagnostic    Result Date: 5/26/2023    1. Patchy bilateral consolidations worse in the left upper lobe, suggesting multifocal pneumonia. 2. Small right pleural effusion with bibasilar atelectasis. 3. Moderate emphysema.     MORALES TESFAYE MD       Electronically Signed and Approved By: MORALES TESFAYE  MD on 5/26/2023 at 17:50             XR Chest 1 View    Result Date: 5/28/2023    Multifocal patchy airspace opacities present bilaterally, likely related to pneumonia.  This is likely superimposed on a mild pulmonary edema pattern.  No significant pleural effusion.       MELANIE CHOE MD       Electronically Signed and Approved By: MELANIE CHOE MD on 5/28/2023 at 9:24             XR Chest 1 View    Result Date: 5/26/2023    No acute infiltrate is appreciated.    Please note that portions of this note were completed with a voice recognition program.  JEN JONES JR, MD       Electronically Signed and Approved By: JEN JONES JR, MD on 5/26/2023 at 2:27                [x]  EKG/Telemetry   [x]  Cardiology/Vascular   Echocardiogram 5/26/2023    •  Left ventricular ejection fraction appears to be 46 - 50%.  •  Left ventricular wall thickness is consistent with mild concentric hypertrophy.  •  Left ventricular diastolic function was indeterminate.  •  There is a TAVR valve present.  •  Moderate to severe tricuspid valve regurgitation is present.  •  Estimated right ventricular systolic pressure from tricuspid regurgitation is mildly elevated (35-45 mmHg).    []  Pathology  []  Old records  [x]  Other:  Scheduled Meds:atorvastatin, 80 mg, Nasogastric, Nightly  ceFAZolin, 2 g, Intravenous, Q8H  clopidogrel, 75 mg, Nasogastric, Daily  enoxaparin, 40 mg, Subcutaneous, Q24H  furosemide, 40 mg, Intravenous, Q12H  gabapentin, 100 mg, Nasogastric, Q8H  insulin detemir, 10 Units, Subcutaneous, Nightly  insulin regular, 2-7 Units, Subcutaneous, Q6H  midodrine, 10 mg, Nasogastric, TID AC  nicotine, 1 patch, Transdermal, Q24H  saccharomyces boulardii, 250 mg, Nasogastric, BID  senna-docusate sodium, 2 tablet, Nasogastric, BID  sodium chloride, 10 mL, Intravenous, Q12H      Continuous Infusions:norepinephrine, 0.02-0.5 mcg/kg/min, Last Rate: 0.1 mcg/kg/min (05/28/23 0410)  pain,   Pharmacy to Dose enoxaparin (LOVENOX),       PRN  Meds:.•  acetaminophen  •  albuterol  •  senna-docusate sodium **AND** polyethylene glycol **AND** [DISCONTINUED] bisacodyl **AND** bisacodyl  •  dextrose  •  dextrose  •  First Mouthwash (Magic Mouthwash)  •  glucagon (human recombinant)  •  haloperidol lactate  •  HYDROmorphone  •  loperamide  •  LORazepam  •  melatonin  •  nitroglycerin  •  ondansetron  •  Pharmacy to Dose enoxaparin (LOVENOX)  •  sodium chloride  •  sodium chloride      Assessment & Plan   Assessment / Plan     Assessment/Plan:  Septic shock  Lactic acidosis  Acute metabolic encephalopathy secondary to septic shock  Multifocal pneumonia  MSSA infection of right venous stasis ulcer  Acute on chronic hypoxic respiratory failure  NSTEMI secondary to septic shock  Acute kidney injury secondary to septic shock  Acute systolic congestive heart failure  Transaminitis  Lactic acidosis  Hepatic cirrhosis  Hep C antibody positive  Insulin-dependent diabetes mellitus  Diabetic neuropathy  Aortic stenosis status post TAVR  Hodgkin's lymphoma  Recently diagnosed throat cancer  Chronic back pain status post pain pump implant  Hypokalemia  Hypomagnesemia  Thrombocytopenia concern secondary to cirrhosis  Wheelchair-bound        Patient admitted for further evaluation and treatment  Pulmonology critical care and cardiology consulted thank you for assistance  Change antibiotics to cefazolin for MSSA coverage per pulmonology.  Continue Levophed as needed  Continue midodrine and titrate per pulmonology  Continue regular reorientation  Ammonia level within normal limits  Continue bronchopulmonary hygiene protocol  Lower extremity venous duplex due to history of DVTs not on anticoagulation  Continue wound care  Follow-up wound culture sensitivities  Continue supplemental nasal cannula oxygen titrated keep sats greater than 90%  Continue Plavix and atorvastatin  Cardiology recommending cardiac catheterization once stable  Start furosemide 40 mg IV twice  daily  Continue monitor renal function during diuresis  Monitor electrolytes and replace as needed  Continue to monitor transaminases  Follow-up hep C viral load  Continue tube feeds  Consult speech  Advance diet per speech  Continue Levemir and titrate as needed  Continue sliding scale insulin  Patient will need to follow-up with oncology as an outpatient once stable  Patient's pain pump controller currently not available  Continue IV Dilaudid as needed to avoid withdrawal symptoms  Continue to monitor electrolytes and replace as needed  Continue monitor thrombocytopenia suspect secondary to splenic sequestration with sepsis  Further inpatient orders recommendations pending clinical course           Discussed plan with bedside RN as well as pulmonology critical care team.    Disposition: We will evaluate once patient is stable.    DVT prophylaxis:  Medical DVT prophylaxis orders are present.    CODE STATUS:   Level Of Support Discussed With: Next of Kin (If No Surrogate)  Code Status (Patient has no pulse and is not breathing): CPR (Attempt to Resuscitate)  Medical Interventions (Patient has pulse or is breathing): Full Support  Comments: daughter at bedside confirmed code status

## 2023-05-28 NOTE — THERAPY EVALUATION
"Acute Care - Speech Language Pathology   Swallow Initial Evaluation  Go     Patient Name: Isha Llanes  : 1965  MRN: [id number]  Today's Date: 2023               Admit Date: 2023    Visit Dx:     ICD-10-CM ICD-9-CM   1. Septic shock  A41.9 038.9    R65.21 785.52     995.92   2. Lactic acidosis  E87.20 276.2   3. Elevated troponin  R77.8 790.6   4. Acute renal failure, unspecified acute renal failure type  N17.9 584.9   5. Acute metabolic encephalopathy  G93.41 348.31   6. Oropharyngeal dysphagia  R13.12 787.22     Patient Active Problem List   Diagnosis   • Septic shock     Past Medical History:   Diagnosis Date   • Arthritis    • Asthma    • Cancer    • COPD (chronic obstructive pulmonary disease)    • Coronary artery disease    • Diabetes mellitus    • GERD (gastroesophageal reflux disease)    • History of transfusion    • Hypertension    • Sleep apnea    • Stroke      Past Surgical History:   Procedure Laterality Date   • ABDOMINAL SURGERY     • BACK SURGERY     • CARDIAC CATHETERIZATION     • CARDIAC SURGERY     • COLONOSCOPY     • ENDOSCOPY     • HYSTERECTOMY     • SKIN BIOPSY     • TONSILLECTOMY           Inpatient Speech Pathology Dysphagia Evaluation        PAIN SCALE: None indicated    PRECAUTIONS/CONTRAINDICATIONS: Standard    SUSPECTED ABUSE/NEGLECT/EXPLOITATION: None identified    SOCIAL/PSYCHOLOGICAL NEEDS/BARRIERS: None identified    PAST SOCIAL HISTORY: 57-year-old female, lives at home    PRIOR FUNCTION: Currently NPO.  Patient on a diet at home    PATIENT GOALS/EXPECTATIONS: Patient wants to \"feel better\"    HISTORY:  Isha Llanes is a 57 y.o. female   history of aortic stenosis status post TAVR, Hodgkin's lymphoma, recently diagnosed throat cancer, lupus, chronic back pain post intrathecal pain pump implant brought into the emergency department for evaluation of altered mental status.  She was admitted to [facility]'s ICU on 2023.  Due to altered " mental status,cortrak was placed for feeding however nursing reports tube feeding had not been initiated.  Patient with improvement in mental status however new onset nausea is reported.  Patient referred for speech pathology dysphagia evaluation for assessment of swallow function and determine if safe p.o. diet can be initiated.    CURRENT DIET LEVEL: N.p.o.    OBJECTIVE:    TEST ADMINISTERED: Clinical dysphagia evaluation    COGNITION/SAFETY AWARENESS: Not thoroughly evaluated    BEHAVIORAL OBSERVATIONS: Patient is awake, answers simple questions, oriented to environment.  Patient complaining of nausea.  Nursing has recently administered Zofran.    ORAL MOTOR EXAM: Missing dentition, dry oral mucosa    VOICE QUALITY: Clear    REFLEX EXAM: Nonproductive    POSTURE: Assisted with sitting fully upright    FEEDING/SWALLOWING FUNCTION: Assessed with ice chips, thin liquids, nectar thick liquids, softened solids.  Patient refused trials of regular solids due to increasing nausea.    CLINICAL OBSERVATIONS: Single ice chips with slow rotary chew.  Delayed swallows completed.  Double swallow observed.  Laryngeal sounds and vocal quality clear.  Single sips of nectar thick and thin liquids by spoon and controlled straw drink.  Swallows were completed with mild delay.  Vocal quality and laryngeal sounds clear.  Small bites of purée with swallow completed.  Double swallow observed.  Throat clearing with subsequent swallow.  Laryngeal sounds clear.  Small bites of soft and solid with patient holding in oral cavity.  Slow rotary chew and decreased oral transit.  Swallow completed.  Double swallow with throat clearing.  Laryngeal sounds and vocal quality clear.  Attempted crunchy solids however patient refusing due to increasing nausea.  Patient exhibiting decreased rotary chew and oral transit, mild swallow delay.  Intermittent throat clearing observed with purées and softened solids however laryngeal sounds and vocal quality  remaining clear.  No overt signs or symptoms of aspiration were observed at bedside however patient is considered to be at risk due to swallow delay.  Silent aspiration cannot be ruled out at bedside.    DYSPHAGIA CRITERIA: Risk of aspiration    FUNCTIONAL ASSESSMENT INSTRUMENT: Patient currently scored a level 5 of 7 on Functional Communication Measures for swallowing indicating a 20-39% limitation in function.    ASSESSMENT/ PLAN OF CARE:  Pt presents with limitations, noted below, that impede patient's ability to swallow safely and maintain nutrition. The skills of a therapist will be required to safely and effectively implement the following treatment plan to restore maximal level of function.    PROBLEMS:  1.  Risk of aspiration, swallow delay, newly diagnosed throat cancer   LTG 1: 30 days.  Patient will increase functional communication measures for swallowing to level 6 of 7, indicating 1-19% limitation in function.   STG 1a: 14 days.  Patient will tolerate least restrictive diet of mechanical soft solids, thin liquids with minimal to no signs or symptoms of aspiration.   STG 1b: 14 days.  Patient will tolerate trials of regular solids with minimal to no signs or symptoms of aspiration.   STG 1c: 14 days.  Patient will tolerate diet of mechanical soft solids, thin liquids utilizing strategies with minimal assist.   TREATMENT: Speech therapy for dysphagia, education of strategies and tolerance of least restrictive diet.    FREQUENCY/DURATION: Daily, 5 days a week    REHAB POTENTIAL:  Pt has good rehab potential.  The following limitations may influence improvement/ length of tx medical status.    RECOMMENDATIONS:   1.   DIET: Mechanical soft solids, thin liquids.  Physician however may wish to continue cortrak temporarily for nutritional support (due to patient's nausea).    2.  POSITION: Fully upright for all p.o. intake, 30 minutes following    3.  COMPENSATORY STRATEGIES: Assist for feeding/set up, small  bites and sips, controlled drink    Pt/responsible party agrees with plan of care and has been informed of all alternatives, risks and benefits.  SLP Recommendation and Plan                          Anticipated Discharge Disposition (SLP): anticipate therapy at next level of care (05/28/23 1022)                                                            EDUCATION  The patient has been educated in the following areas:   Dysphagia (Swallowing Impairment).              Time Calculation:    Time Calculation- SLP     Row Name 05/28/23 1022             Time Calculation- SLP    SLP Start Time 0900  -SN      SLP Stop Time 1000  -SN      SLP Time Calculation (min) 60 min  -SN      SLP Received On 05/28/23  -SN         Untimed Charges    90280-FQ Eval Oral Pharyng Swallow Minutes 60  -SN         Total Minutes    Untimed Charges Total Minutes 60  -SN       Total Minutes 60  -SN            User Key  (r) = Recorded By, (t) = Taken By, (c) = Cosigned By    Initials Name Provider Type    Tawana Betancourt MS-CCC/SLP, SANDRA Speech and Language Pathologist                Therapy Charges for Today     Code Description Service Date Service Provider Modifiers Qty    [id number] HC ST EVAL ORAL PHARYNG SWALLOW 4 5/28/2023 Tawana Petersen MS-CCC/SLP, SANDRA GN 1               CHICO Dolan/SANDRA VARGAS  5/28/2023

## 2023-05-28 NOTE — PROGRESS NOTES
Pulmonary / Critical Care Progress Note      Patient Name: Isha Llanes  : 1965  MRN: [id number]  Attending:  Mic Herrera MD   Date of admission: 2023    Subjective   Subjective   Patient critically ill with septic shock, multifocal pneumonia    Over past 24 hours:  Put back on a small amount of Levophed this morning  Resting bed comfortably  Very weak and fatigued  Is having frequent bowel movements.  This is chronic and has been worked up as an outpatient  Asking for her home gabapentin  Culture so far negative  Platelets have plateaued  Requiring potassium appears very swollen with increased leg swelling and volume overloaded  Potassium and phosphorus low this morning  Glucose is elevated  Hep C antibody+  Core track in place.  Still not started on tube feeds despite the fact they have been ordered 2 days ago and I asked yesterday to start tube feeds    Review of Systems  Constitutional symptoms: Very weak and fatigued  Ear, nose, throat: Denied complaints  Cardiovascular:   Leg swelling, otherwise denied complaints  Respiratory: Congested, nonproductive cough  Gastrointestinal: Denied complaints  Musculoskeletal: Denied complaints  Neurologic: Denied complaints  Skin: Denied complaints        Objective   Objective     Vitals:   Vital signs for last 24 hours:  Temp:  [97.8 °F (36.6 °C)-98.9 °F (37.2 °C)] 98.9 °F (37.2 °C)  Heart Rate:  [] 91  Resp:  [23-32] 23  BP: ()/() 97/75    Intake/Output last 3 shifts:  I/O last 3 completed shifts:  In: 80 [NG/GT:80]  Out:  [Urine:2010]  Intake/Output this shift:  No intake/output data recorded.    Vent settings for last 24 hours:       Hemodynamic parameters for last 24 hours:       Physical Exam   Vital Signs Reviewed   General: Chronically ill-appearing, acutely ill appearing, awake, no acute distress  HEENT:  PERRL, EOMI.  OP clear, nares with NG tube  Chest:  good aeration, coarse rhonchi to auscultation bilaterally, tympanic  to percussion bilaterally, no work of breathing noted  CV: RRR, no MGR, pulses 2+, equal.  Abd:  Soft, NT, ND, + BS, no HSM  EXT:  no clubbing, no cyanosis, marked leg edema  Neuro:  A&Ox2, lethargic, does wake up and states that she needs her anxiety medicine and then drift back to sleep, CN grossly intact, no focal deficits.  Skin: No rashes or lesions noted  Core track in place      Result Review    Result Review:  I have personally reviewed the results from the time of this admission to 5/28/2023 11:22 EDT and agree with these findings:  [x]  Laboratory  [x]  Microbiology  [x]  Radiology  []  EKG/Telemetry   [x]  Cardiology/Vascular   []  Pathology  []  Old records  []  Other:  Most notable findings include:       Lab 05/28/23  0829 05/28/23  0808 05/27/23  0613 05/26/23  0708 05/26/23  0142   WBC 16.33*  --  19.09*  --  10.29   HEMOGLOBIN 10.9*  --  11.6*  --  15.1   HEMATOCRIT 35.2  --  35.1  --  46.4   PLATELETS 48*  --  50*  --  95*   SODIUM  --  138 138 136 137   SODIUM, ARTERIAL  --   --   --   --  134.6*   POTASSIUM  --  3.8 3.8 3.5 3.4*   CHLORIDE  --  106 107 103 96*   CO2  --  21.7* 22.7 17.9* 19.0*   BUN  --  14 19 30* 25*   CREATININE  --  0.49* 0.57 1.32* 2.07*   GLUCOSE  --  163* 116* 168* 172*   GLUCOSE, ARTERIAL  --   --   --   --  181*   CALCIUM  --  8.0* 7.7* 8.0* 9.6   PHOSPHORUS  --  1.7* 3.1  --   --    TOTAL PROTEIN  --  5.6* 5.8*  --  7.2   ALBUMIN  --  2.5* 2.9*  --  3.8   GLOBULIN  --  3.1 2.9  --  3.4     CT Abdomen Pelvis Without Contrast    Result Date: 5/26/2023    1. Hepatic steatosis. 2. Nonobstructing right renal stone. 3. Mild pelvic ascites.     MORALES TESFAYE MD       Electronically Signed and Approved By: MORALES TESFAYE MD on 5/26/2023 at 17:55             CT Chest Without Contrast Diagnostic    Result Date: 5/26/2023    1. Patchy bilateral consolidations worse in the left upper lobe, suggesting multifocal pneumonia. 2. Small right pleural effusion with bibasilar  atelectasis. 3. Moderate emphysema.     MORALES TESFAYE MD       Electronically Signed and Approved By: MORALES TESFAYE MD on 5/26/2023 at 17:50             XR Chest 1 View    Result Date: 5/28/2023    Multifocal patchy airspace opacities present bilaterally, likely related to pneumonia.  This is likely superimposed on a mild pulmonary edema pattern.  No significant pleural effusion.       MELANIE CHOE MD       Electronically Signed and Approved By: MELANIE CHOE MD on 5/28/2023 at 9:24                 Assessment & Plan   Assessment / Plan     Active Hospital Problems:  Active Hospital Problems    Diagnosis    • **Septic shock      Impression:  Septic shock, present on admission  Multifocal pneumonia from unspecified organism  Altered mental status  Toxic/metabolic encephalopathy  Anion gap metabolic acidosis  Lactic acidosis, clinically significant  Acute kidney injury, prerenal  Hypotension  Hypokalemia  Hypomagnesia   Thrombocytopenia  Transaminitis  Hep C antibody positive  Type 2 diabetes with hyperglycemia  Recent diagnosis of throat cancer  History of lymphoma  NSTEMI, likely demand type II  Severe aortic stenosis status post TAVR  Acute decompensated diastolic congestive heart failure  Volume overload  MSSA wound infection     Plan:  Increase midodrine to 10 mg 3 times daily.  Wean off norepinephrine to keep mean arterial pressure greater than 65  Respiratory cultures and viral panel pending  Vasopressin and levo on standby BP marginal, will add midodrine 5 mg 3 times daily , Use Levophed to maintain MAP greater than 65 if needed   CT scan of chest reviewed does have multifocal pneumonia  Check respiratory viral panel, send sputum culture if produces specimen  Urine antigens, MRSA PCR negative.  Wound culture did grow MSSA.  Adjust antibiotics to cefazolin.  Day 3/7  Procalcitonin appears to be better.  Wean O2 to keep SPO2 greater than 90%  Add bronch continue nebulizers and bronchopulmonary  hygiene  Continue aspiration precautions  Start Lasix 40 mg IV twice daily as she appears volume overloaded  Trend renal panel and electrolytes.  Replace potassium and phosphorus IV  Okay to discontinue Keating catheter  Replace potassium per NG and calcium IV  Core track in place.  Please start tube feeds.  They have been ordered since Friday in the last 2 days on rounds I have asked to start them.  Imodium for diarrhea.  Has had chronic diarrhea and a significant work-up as an outpatient  Okay to restart home gabapentin.  Per nursing staff has a Dilaudid pain pump in place however, it is not on  Hep C antibody was positive,.  Pending HCV RNA quantitative of blood  Continue Levemir, and SSI for optimal glucose control.  Levemir adjusted this morning  LFTs trending  Thrombocytopenia stable.  Likely related to septic shock, drug-induced from antibiotics  Okay to get up to chair     DVT prophylaxis:   Medical DVT prophylaxis orders are present.    CODE STATUS:   Level Of Support Discussed With: Next of Kin (If No Surrogate)  Code Status (Patient has no pulse and is not breathing): CPR (Attempt to Resuscitate)  Medical Interventions (Patient has pulse or is breathing): Full Support  Comments: daughter at bedside confirmed code status      The patient is critically ill in the ICU with septic shock, multifocal pneumonia, acute kidney injury, altered mental status, multiple electrolyte disturbances, NSTEMI. Multidisciplinary bedside critical care rounds were performed with nursing staff, respiratory therapy, pharmacy, nutritional services, social work. I have personally reviewed the chart, labs and any pertinent imaging available. 36  minutes of critical care time spent managing this patient, excluding procedures.    Electronically signed by Enzo Segal MD, 05/28/23, 11:26 AM EDT.

## 2023-05-28 NOTE — PLAN OF CARE
Goal Outcome Evaluation:    ASSESSMENT/ PLAN OF CARE:  Pt presents with limitations, noted below, that impede patient's ability to swallow safely and maintain nutrition. The skills of a therapist will be required to safely and effectively implement the following treatment plan to restore maximal level of function.    PROBLEMS:  1.  Risk of aspiration, swallow delay, newly diagnosed throat cancer  TREATMENT: Speech therapy for dysphagia, education of strategies and tolerance of least restrictive diet.    FREQUENCY/DURATION: Daily, 5 days a week    REHAB POTENTIAL:  Pt has good rehab potential.  The following limitations may influence improvement/ length of tx medical status.    RECOMMENDATIONS:   1.   DIET: Mechanical soft solids, thin liquids.  Physician however may wish to continue cortrak temporarily for nutritional support (due to patient's nausea).    2.  POSITION: Fully upright for all p.o. intake, 30 minutes following    3.  COMPENSATORY STRATEGIES: Assist for feeding/set up, small bites and sips, controlled drink

## 2023-05-28 NOTE — PROGRESS NOTES
Date of service: 5/28/2023    The patient is sleeping comfortably at this time.  She is in no apparent pain or respiratory distress.    Discussed her care with her RN.  She is still on norepinephrine  For hypotension or borderline blood pressure.  Midodrine was increased to 10 mg p.o. 3 times daily.    Records reviewed.    Assessment and plan:     1.  Acute non-STEMI.    2.  Septic shock.  Still on IV norepinephrine  3.  Acute kidney injury, improved  4.  Altered mental state, improving  5.  Hypokalemia, corrected  6.  Planning to proceed with cardiac catheterization when she becomes more stable.

## 2023-05-28 NOTE — PLAN OF CARE
Goal Outcome Evaluation:         Pt has struggled with constant pain and intermittent N/V throughout the shift. Medicated per eMar with little relief. Levo gtt lowered to 0.08 at current with Map >65. Will continue to titrate as appropriate. VSS   Discussed starting ordered tube feeds with Dr Rocha; advised to hold tube feed and place speech consult for possibility of beginning diet, increase glucose checks and treat per eMar PRN.  Pt treated x2 for hypoglycemia.

## 2023-05-29 LAB
ALBUMIN SERPL-MCNC: 2.9 G/DL (ref 3.5–5.2)
ALBUMIN/GLOB SERPL: 1.1 G/DL
ALP SERPL-CCNC: 102 U/L (ref 39–117)
ALT SERPL W P-5'-P-CCNC: 72 U/L (ref 1–33)
ANION GAP SERPL CALCULATED.3IONS-SCNC: 7 MMOL/L (ref 5–15)
APTT PPP: 31 SECONDS (ref 78–95.9)
AST SERPL-CCNC: 44 U/L (ref 1–32)
BILIRUB SERPL-MCNC: 0.6 MG/DL (ref 0–1.2)
BUN SERPL-MCNC: 14 MG/DL (ref 6–20)
BUN/CREAT SERPL: 24.6 (ref 7–25)
CALCIUM SPEC-SCNC: 8 MG/DL (ref 8.6–10.5)
CHLORIDE SERPL-SCNC: 105 MMOL/L (ref 98–107)
CO2 SERPL-SCNC: 27 MMOL/L (ref 22–29)
CREAT SERPL-MCNC: 0.57 MG/DL (ref 0.57–1)
EGFRCR SERPLBLD CKD-EPI 2021: 106.1 ML/MIN/1.73
GEN 5 2HR TROPONIN T REFLEX: 81 NG/L
GLOBULIN UR ELPH-MCNC: 2.6 GM/DL
GLUCOSE BLDC GLUCOMTR-MCNC: 101 MG/DL (ref 70–99)
GLUCOSE BLDC GLUCOMTR-MCNC: 104 MG/DL (ref 70–99)
GLUCOSE BLDC GLUCOMTR-MCNC: 118 MG/DL (ref 70–99)
GLUCOSE BLDC GLUCOMTR-MCNC: 119 MG/DL (ref 70–99)
GLUCOSE BLDC GLUCOMTR-MCNC: 140 MG/DL (ref 70–99)
GLUCOSE BLDC GLUCOMTR-MCNC: 141 MG/DL (ref 70–99)
GLUCOSE BLDC GLUCOMTR-MCNC: 158 MG/DL (ref 70–99)
GLUCOSE BLDC GLUCOMTR-MCNC: 59 MG/DL (ref 70–99)
GLUCOSE BLDC GLUCOMTR-MCNC: 92 MG/DL (ref 70–99)
GLUCOSE BLDC GLUCOMTR-MCNC: 96 MG/DL (ref 70–99)
GLUCOSE SERPL-MCNC: 146 MG/DL (ref 65–99)
MAGNESIUM SERPL-MCNC: 1.7 MG/DL (ref 1.6–2.6)
POTASSIUM SERPL-SCNC: 3.6 MMOL/L (ref 3.5–5.2)
PROT SERPL-MCNC: 5.5 G/DL (ref 6–8.5)
SODIUM SERPL-SCNC: 139 MMOL/L (ref 136–145)
TROPONIN T DELTA: 4 NG/L
TROPONIN T SERPL HS-MCNC: 77 NG/L

## 2023-05-29 PROCEDURE — 63710000001 INSULIN REGULAR HUMAN PER 5 UNITS: Performed by: FAMILY MEDICINE

## 2023-05-29 PROCEDURE — 85730 THROMBOPLASTIN TIME PARTIAL: CPT | Performed by: FAMILY MEDICINE

## 2023-05-29 PROCEDURE — 93010 ELECTROCARDIOGRAM REPORT: CPT | Performed by: INTERNAL MEDICINE

## 2023-05-29 PROCEDURE — 63710000001 INSULIN DETEMIR PER 5 UNITS: Performed by: FAMILY MEDICINE

## 2023-05-29 PROCEDURE — 25010000002 ENOXAPARIN PER 10 MG: Performed by: NURSE PRACTITIONER

## 2023-05-29 PROCEDURE — 25010000002 FUROSEMIDE PER 20 MG: Performed by: FAMILY MEDICINE

## 2023-05-29 PROCEDURE — 25010000002 HYDROMORPHONE 1 MG/ML SOLUTION: Performed by: FAMILY MEDICINE

## 2023-05-29 PROCEDURE — 25010000002 MAGNESIUM SULFATE 2 GM/50ML SOLUTION: Performed by: NURSE PRACTITIONER

## 2023-05-29 PROCEDURE — 94799 UNLISTED PULMONARY SVC/PX: CPT

## 2023-05-29 PROCEDURE — 82948 REAGENT STRIP/BLOOD GLUCOSE: CPT

## 2023-05-29 PROCEDURE — 84484 ASSAY OF TROPONIN QUANT: CPT | Performed by: FAMILY MEDICINE

## 2023-05-29 PROCEDURE — 25010000002 CEFAZOLIN IN DEXTROSE 2-4 GM/100ML-% SOLUTION: Performed by: INTERNAL MEDICINE

## 2023-05-29 PROCEDURE — 99233 SBSQ HOSP IP/OBS HIGH 50: CPT | Performed by: FAMILY MEDICINE

## 2023-05-29 PROCEDURE — 80053 COMPREHEN METABOLIC PANEL: CPT | Performed by: NURSE PRACTITIONER

## 2023-05-29 PROCEDURE — 99233 SBSQ HOSP IP/OBS HIGH 50: CPT | Performed by: STUDENT IN AN ORGANIZED HEALTH CARE EDUCATION/TRAINING PROGRAM

## 2023-05-29 PROCEDURE — 25010000002 CEFTRIAXONE PER 250 MG: Performed by: FAMILY MEDICINE

## 2023-05-29 PROCEDURE — 93005 ELECTROCARDIOGRAM TRACING: CPT

## 2023-05-29 PROCEDURE — 83735 ASSAY OF MAGNESIUM: CPT | Performed by: NURSE PRACTITIONER

## 2023-05-29 PROCEDURE — 93005 ELECTROCARDIOGRAM TRACING: CPT | Performed by: FAMILY MEDICINE

## 2023-05-29 RX ORDER — CEFTRIAXONE SODIUM 1 G/50ML
1 INJECTION, SOLUTION INTRAVENOUS
Status: DISCONTINUED | OUTPATIENT
Start: 2023-05-29 | End: 2023-05-31 | Stop reason: HOSPADM

## 2023-05-29 RX ORDER — AMOXICILLIN 250 MG
2 CAPSULE ORAL 2 TIMES DAILY
Status: DISCONTINUED | OUTPATIENT
Start: 2023-05-29 | End: 2023-05-29

## 2023-05-29 RX ORDER — POTASSIUM CHLORIDE 1.5 G/1.77G
40 POWDER, FOR SOLUTION ORAL ONCE
Status: COMPLETED | OUTPATIENT
Start: 2023-05-29 | End: 2023-05-29

## 2023-05-29 RX ORDER — ATORVASTATIN CALCIUM 40 MG/1
80 TABLET, FILM COATED ORAL NIGHTLY
Status: DISCONTINUED | OUTPATIENT
Start: 2023-05-29 | End: 2023-05-31 | Stop reason: HOSPADM

## 2023-05-29 RX ORDER — BISACODYL 10 MG
10 SUPPOSITORY, RECTAL RECTAL DAILY PRN
Status: DISCONTINUED | OUTPATIENT
Start: 2023-05-29 | End: 2023-05-31 | Stop reason: HOSPADM

## 2023-05-29 RX ORDER — MAGNESIUM SULFATE HEPTAHYDRATE 40 MG/ML
2 INJECTION, SOLUTION INTRAVENOUS ONCE
Status: COMPLETED | OUTPATIENT
Start: 2023-05-29 | End: 2023-05-29

## 2023-05-29 RX ORDER — MIDODRINE HYDROCHLORIDE 10 MG/1
10 TABLET ORAL
Status: DISCONTINUED | OUTPATIENT
Start: 2023-05-29 | End: 2023-05-31 | Stop reason: HOSPADM

## 2023-05-29 RX ORDER — SACCHAROMYCES BOULARDII 250 MG
250 CAPSULE ORAL 2 TIMES DAILY
Status: DISCONTINUED | OUTPATIENT
Start: 2023-05-29 | End: 2023-05-31 | Stop reason: HOSPADM

## 2023-05-29 RX ORDER — POLYETHYLENE GLYCOL 3350 17 G/17G
17 POWDER, FOR SOLUTION ORAL DAILY PRN
Status: DISCONTINUED | OUTPATIENT
Start: 2023-05-29 | End: 2023-05-29

## 2023-05-29 RX ORDER — CLOPIDOGREL BISULFATE 75 MG/1
75 TABLET ORAL DAILY
Status: DISCONTINUED | OUTPATIENT
Start: 2023-05-30 | End: 2023-05-31

## 2023-05-29 RX ORDER — LORAZEPAM 2 MG/ML
0.5 CONCENTRATE ORAL EVERY 6 HOURS PRN
Status: DISCONTINUED | OUTPATIENT
Start: 2023-05-29 | End: 2023-05-31

## 2023-05-29 RX ORDER — LOPERAMIDE HYDROCHLORIDE 2 MG/1
2 CAPSULE ORAL 4 TIMES DAILY PRN
Status: DISCONTINUED | OUTPATIENT
Start: 2023-05-29 | End: 2023-05-31

## 2023-05-29 RX ORDER — BISACODYL 10 MG
10 SUPPOSITORY, RECTAL RECTAL DAILY PRN
Status: DISCONTINUED | OUTPATIENT
Start: 2023-05-29 | End: 2023-05-29

## 2023-05-29 RX ORDER — CHOLECALCIFEROL (VITAMIN D3) 125 MCG
10 CAPSULE ORAL NIGHTLY PRN
Status: DISCONTINUED | OUTPATIENT
Start: 2023-05-29 | End: 2023-05-31 | Stop reason: HOSPADM

## 2023-05-29 RX ORDER — GABAPENTIN 100 MG/1
100 CAPSULE ORAL EVERY 8 HOURS SCHEDULED
Status: DISCONTINUED | OUTPATIENT
Start: 2023-05-29 | End: 2023-05-31

## 2023-05-29 RX ORDER — POLYETHYLENE GLYCOL 3350 17 G/17G
17 POWDER, FOR SOLUTION ORAL DAILY PRN
Status: DISCONTINUED | OUTPATIENT
Start: 2023-05-29 | End: 2023-05-31 | Stop reason: HOSPADM

## 2023-05-29 RX ORDER — AMOXICILLIN 250 MG
2 CAPSULE ORAL 2 TIMES DAILY
Status: DISCONTINUED | OUTPATIENT
Start: 2023-05-29 | End: 2023-05-31 | Stop reason: HOSPADM

## 2023-05-29 RX ORDER — ACETAMINOPHEN 325 MG/1
650 TABLET ORAL EVERY 6 HOURS PRN
Status: DISCONTINUED | OUTPATIENT
Start: 2023-05-29 | End: 2023-05-31

## 2023-05-29 RX ADMIN — GABAPENTIN 100 MG: 100 CAPSULE ORAL at 21:16

## 2023-05-29 RX ADMIN — Medication 10 ML: at 21:16

## 2023-05-29 RX ADMIN — HYDROMORPHONE HYDROCHLORIDE 0.5 MG: 1 INJECTION, SOLUTION INTRAMUSCULAR; INTRAVENOUS; SUBCUTANEOUS at 06:46

## 2023-05-29 RX ADMIN — DOCUSATE SODIUM 50MG AND SENNOSIDES 8.6MG 2 TABLET: 8.6; 5 TABLET, FILM COATED ORAL at 10:04

## 2023-05-29 RX ADMIN — HYDROMORPHONE HYDROCHLORIDE 0.5 MG: 1 INJECTION, SOLUTION INTRAMUSCULAR; INTRAVENOUS; SUBCUTANEOUS at 01:49

## 2023-05-29 RX ADMIN — CLOPIDOGREL BISULFATE 75 MG: 75 TABLET ORAL at 10:04

## 2023-05-29 RX ADMIN — Medication 250 MG: at 21:16

## 2023-05-29 RX ADMIN — HYDROMORPHONE HYDROCHLORIDE 0.5 MG: 1 INJECTION, SOLUTION INTRAMUSCULAR; INTRAVENOUS; SUBCUTANEOUS at 11:35

## 2023-05-29 RX ADMIN — CEFTRIAXONE SODIUM 1 G: 1 INJECTION, SOLUTION INTRAVENOUS at 14:51

## 2023-05-29 RX ADMIN — Medication 250 MG: at 10:05

## 2023-05-29 RX ADMIN — FUROSEMIDE 40 MG: 10 INJECTION, SOLUTION INTRAMUSCULAR; INTRAVENOUS at 10:04

## 2023-05-29 RX ADMIN — Medication 250 MG: at 10:02

## 2023-05-29 RX ADMIN — GABAPENTIN 100 MG: 100 CAPSULE ORAL at 05:47

## 2023-05-29 RX ADMIN — MAGNESIUM SULFATE HEPTAHYDRATE 2 G: 40 INJECTION, SOLUTION INTRAVENOUS at 07:40

## 2023-05-29 RX ADMIN — DIPHENHYDRAMINE HYDROCHLORIDE AND LIDOCAINE HYDROCHLORIDE AND ALUMINUM HYDROXIDE AND MAGNESIUM HYDRO 5 ML: KIT at 15:43

## 2023-05-29 RX ADMIN — POTASSIUM CHLORIDE FOR ORAL SOLUTION 40 MEQ: 1.5 POWDER, FOR SOLUTION ORAL at 07:39

## 2023-05-29 RX ADMIN — CEFAZOLIN SODIUM 2 G: 2 INJECTION, SOLUTION INTRAVENOUS at 10:05

## 2023-05-29 RX ADMIN — Medication 10 ML: at 14:52

## 2023-05-29 RX ADMIN — HYDROMORPHONE HYDROCHLORIDE 0.5 MG: 1 INJECTION, SOLUTION INTRAMUSCULAR; INTRAVENOUS; SUBCUTANEOUS at 04:20

## 2023-05-29 RX ADMIN — CLOPIDOGREL BISULFATE 75 MG: 75 TABLET ORAL at 10:02

## 2023-05-29 RX ADMIN — ENOXAPARIN SODIUM 40 MG: 100 INJECTION SUBCUTANEOUS at 10:05

## 2023-05-29 RX ADMIN — MIDODRINE HYDROCHLORIDE 10 MG: 10 TABLET ORAL at 18:34

## 2023-05-29 RX ADMIN — MIDODRINE HYDROCHLORIDE 10 MG: 10 TABLET ORAL at 12:08

## 2023-05-29 RX ADMIN — GABAPENTIN 100 MG: 100 CAPSULE ORAL at 13:16

## 2023-05-29 RX ADMIN — NICOTINE 1 PATCH: 21 PATCH, EXTENDED RELEASE TRANSDERMAL at 10:04

## 2023-05-29 RX ADMIN — Medication 10 ML: at 10:05

## 2023-05-29 RX ADMIN — INSULIN DETEMIR 10 UNITS: 100 INJECTION, SOLUTION SUBCUTANEOUS at 21:18

## 2023-05-29 RX ADMIN — MIDODRINE HYDROCHLORIDE 10 MG: 5 TABLET ORAL at 07:39

## 2023-05-29 RX ADMIN — INSULIN HUMAN 2 UNITS: 100 INJECTION, SOLUTION PARENTERAL at 06:00

## 2023-05-29 RX ADMIN — HYDROMORPHONE HYDROCHLORIDE 0.5 MG: 1 INJECTION, SOLUTION INTRAMUSCULAR; INTRAVENOUS; SUBCUTANEOUS at 21:14

## 2023-05-29 RX ADMIN — DIPHENHYDRAMINE HYDROCHLORIDE AND LIDOCAINE HYDROCHLORIDE AND ALUMINUM HYDROXIDE AND MAGNESIUM HYDRO 5 ML: KIT at 19:48

## 2023-05-29 RX ADMIN — CEFAZOLIN SODIUM 2 G: 2 INJECTION, SOLUTION INTRAVENOUS at 02:30

## 2023-05-29 RX ADMIN — ATORVASTATIN CALCIUM 80 MG: 40 TABLET, FILM COATED ORAL at 21:16

## 2023-05-29 NOTE — PROGRESS NOTES
Pulmonary / Critical Care Progress Note      Patient Name: Isha Llanes  : 1965  MRN: [id number]  Attending:  Mic Herrera MD   Date of admission: 2023    Subjective   Subjective   Patient critically ill with septic shock, multifocal pneumonia    Over past 24 hours:  Patient remains off vasopressors  On 3 L nasal cannula  On cefazolin for MSSA of wound  Tolerating oral diet  Receiving IV diuresis  Hep C antibody+  CT with multifocal pneumonia  Urine output 700/24 hours  Pro-Ge trending down  Afebrile    Review of Systems  Constitutional symptoms: Very weak and fatigued  Ear, nose, throat: Denied complaints  Cardiovascular:   Leg swelling, otherwise denied complaints  Respiratory: Congested, nonproductive cough  Gastrointestinal: Denied complaints  Musculoskeletal: Denied complaints  Neurologic: Denied complaints  Skin: Denied complaints        Objective   Objective     Vitals:   Vital signs for last 24 hours:  Temp:  [97.8 °F (36.6 °C)-98.7 °F (37.1 °C)] 98.7 °F (37.1 °C)  Heart Rate:  [82-96] 82  Resp:  [15-21] 15  BP: ()/(46-88) 81/60    Intake/Output last 3 shifts:  I/O last 3 completed shifts:  In: 300 [I.V.:220; NG/GT:80]  Out: 1510 [Urine:1510]  Intake/Output this shift:  I/O this shift:  In: -   Out: 250 [Urine:250]    Vent settings for last 24 hours:       Hemodynamic parameters for last 24 hours:       Physical Exam   Vital Signs Reviewed   General:  Alert, NAD.  Chronically ill-appearing female   HEENT:  PERRL, EOMI.  NG tube in place  Neck:  No JVD, no thyromegaly  Lymph: no axillary, cervical, supraclavicular lymphadenopathy noted bilaterally  Chest:  Clear to auscultation bilaterally, no work of breathing noted on 1 L nasal cannula  CV: RRR, no M/G/R, pulses 2+  Abd:  Soft, NT, ND, +BS  EXT:  no clubbing, no cyanosis, lower extremity edema  Neuro:  A&Ox3, CN grossly intact, no focal deficits.  Skin: No rashes or lesions noted    Result Review    Result Review:  I have  personally reviewed the results from the time of this admission to 5/29/2023 11:32 EDT and agree with these findings:  [x]  Laboratory  [x]  Microbiology  [x]  Radiology  []  EKG/Telemetry   [x]  Cardiology/Vascular   []  Pathology  []  Old records  []  Other:  Most notable findings include:       Lab 05/29/23  0604 05/28/23  0829 05/28/23  0808 05/27/23  0613 05/26/23  0708 05/26/23  0142   WBC  --  16.33*  --  19.09*  --  10.29   HEMOGLOBIN  --  10.9*  --  11.6*  --  15.1   HEMATOCRIT  --  35.2  --  35.1  --  46.4   PLATELETS  --  48*  --  50*  --  95*   SODIUM 139  --  138 138 136 137   SODIUM, ARTERIAL  --   --   --   --   --  134.6*   POTASSIUM 3.6  --  3.8 3.8 3.5 3.4*   CHLORIDE 105  --  106 107 103 96*   CO2 27.0  --  21.7* 22.7 17.9* 19.0*   BUN 14  --  14 19 30* 25*   CREATININE 0.57  --  0.49* 0.57 1.32* 2.07*   GLUCOSE 146*  --  163* 116* 168* 172*   GLUCOSE, ARTERIAL  --   --   --   --   --  181*   CALCIUM 8.0*  --  8.0* 7.7* 8.0* 9.6   PHOSPHORUS  --   --  1.7* 3.1  --   --    TOTAL PROTEIN 5.5*  --  5.6* 5.8*  --  7.2   ALBUMIN 2.9*  --  2.5* 2.9*  --  3.8   GLOBULIN 2.6  --  3.1 2.9  --  3.4     XR Chest 1 View    Result Date: 5/28/2023    Multifocal patchy airspace opacities present bilaterally, likely related to pneumonia.  This is likely superimposed on a mild pulmonary edema pattern.  No significant pleural effusion.       MELANIE CHOE MD       Electronically Signed and Approved By: MELANIE CHOE MD on 5/28/2023 at 9:24                 Assessment & Plan   Assessment / Plan     Active Hospital Problems:  Active Hospital Problems    Diagnosis    • **Septic shock      Impression:  Septic shock, present on admission  Multifocal pneumonia from unspecified organism  Altered mental status  Toxic/metabolic encephalopathy  Anion gap metabolic acidosis  Lactic acidosis, clinically significant  Acute kidney injury, prerenal  Hypotension  Hypokalemia  Hypomagnesia   Thrombocytopenia  Transaminitis  Hep C  antibody positive  Type 2 diabetes with hyperglycemia  Recent diagnosis of throat cancer  History of lymphoma  NSTEMI, likely demand type II  Severe aortic stenosis status post TAVR  Acute decompensated diastolic congestive heart failure  Volume overload  MSSA wound infection     Plan:  -Continue supplemental O2, titrate to maintain SPO2 greater than 90  -Continue bronchopulmonary hygiene, I-S, flutter valve  -Aspiration precautions  -CT of chest reviewed does have multifocal pneumonia  -Remains off vasopressors  -Continue midodrine 10 3 times daily  -Okay to discontinue core track  -Advance diet as tolerated, mechanical soft recommended  -Trend renal function, monitor replace electrolytes  -Continue IV Lasix 40 mg twice daily  -Replace potassium and magnesium today  -Continue Ancef for MSSA of wound 4/7  -Pro-Ge trending down  -Continue BAER/SSI for optimal glucose control  -Continue home gabapentin  -Thrombocytopenia, previously stable, follow  -No evidence of bleeding  - hep C antibody was positive, pending HCV RNA quantitative of blood  -Up To chair daily    DVT prophylaxis:   Medical DVT prophylaxis orders are present.    CODE STATUS:   Level Of Support Discussed With: Next of Kin (If No Surrogate)  Code Status (Patient has no pulse and is not breathing): CPR (Attempt to Resuscitate)  Medical Interventions (Patient has pulse or is breathing): Full Support  Comments: daughter at bedside confirmed code status    I, Mira Pratima, LUZ, spent 10 minutes critical care time in accordance to split shared billing.    This patient was seen by both a physician and a NP. ICherelle MD, spent >50% of time in accordance with split shared billing. This included personally reviewing all pertinent labs, imaging, microbiology and documentation. Also discussing the case with the patient and any available family, the admitting physician and any available ancillary staff.     Electronically signed by Cherelle Mitchell MD,  05/29/23, 12:20 PM EDT.

## 2023-05-29 NOTE — PROGRESS NOTES
Chart reviewed.  She has C. difficile.  Ruling out COVID infection is in progress.  Blood pressure is still borderline and she is intermittently hypotensive.  Cardiac cath when she becomes more stable.

## 2023-05-29 NOTE — CONSULTS
"Nutrition Services    Patient Name: Isha Llanes  YOB: 1965  MRN: [id number]  Admission date: 5/26/2023      CLINICAL NUTRITION ASSESSMENT      Reason for Assessment  Follow-up protocol, EN   H&P:    Past Medical History:   Diagnosis Date   • Arthritis    • Asthma    • Cancer    • COPD (chronic obstructive pulmonary disease)    • Coronary artery disease    • Diabetes mellitus    • GERD (gastroesophageal reflux disease)    • History of transfusion    • Hypertension    • Sleep apnea    • Stroke         Current Problems:   Active Hospital Problems    Diagnosis    • **Septic shock         Nutrition/Diet History         Narrative     Nutrition follow up. Admitted with septic shock and AMS. Cortrak placement on 5/26 and patient began EN. Cortrak was removed on 5/29 and EN was discontinued. Oral diet started, no po intake recorded at this time.    RD will CTM per protocol and follow up to provide nutrition intervention is indicated.      Anthropometrics        Current Height, Weight Height: 162.6 cm (64\")  Weight: 72.4 kg (159 lb 9.8 oz)   Current BMI Body mass index is 27.4 kg/m².       Weight Hx  Wt Readings from Last 30 Encounters:   05/29/23 1120 72.4 kg (159 lb 9.8 oz)   05/26/23 0628 73.4 kg (161 lb 13.1 oz)   05/26/23 0121 70.8 kg (156 lb 1.4 oz)            Wt Change Observation GI     Estimated/Assessed Needs       Energy Requirements 25-30 kcal/kg adj BW   EST Needs (kcal/day) 0320-9840 kcal       Protein Requirements 0.8-1.0 g/kg adj BW   EST Daily Needs (g/day) 47-59 g       Fluid Requirements 1 ml/kcal    Estimated Needs (mL/day) 0154-9247 ml     Labs/Medications         Pertinent Labs Reviewed.   Results from last 7 days   Lab Units 05/29/23  0604 05/28/23  0808 05/27/23  0613   SODIUM mmol/L 139 138 138   POTASSIUM mmol/L 3.6 3.8 3.8   CHLORIDE mmol/L 105 106 107   CO2 mmol/L 27.0 21.7* 22.7   BUN mg/dL 14 14 19   CREATININE mg/dL 0.57 0.49* 0.57   CALCIUM mg/dL 8.0* 8.0* 7.7*   BILIRUBIN " mg/dL 0.6 0.6 0.6   ALK PHOS U/L 102 93 89   ALT (SGPT) U/L 72* 102* 132*   AST (SGOT) U/L 44* 91* 177*   GLUCOSE mg/dL 146* 163* 116*     Results from last 7 days   Lab Units 05/29/23  0604 05/28/23  0829 05/28/23  0808 05/27/23  0613   MAGNESIUM mg/dL 1.7  --  2.2 2.4   PHOSPHORUS mg/dL  --   --  1.7* 3.1   HEMOGLOBIN g/dL  --  10.9*  --  11.6*   HEMATOCRIT %  --  35.2  --  35.1     COVID19   Date Value Ref Range Status   05/27/2023 Not Detected Not Detected - Ref. Range Final     No results found for: HGBA1C      Pertinent Medications Reviewed.     Current Nutrition Orders & Evaluation of Intake       Oral Nutrition     Current PO Diet Diet: Diabetic Diets; Consistent Carbohydrate; Texture: Regular Texture (IDDSI 7); Fluid Consistency: Thin (IDDSI 0)   Supplement Orders Placed This Encounter      Diet, Tube Feeding Tube Feeding Formula: Diabetisource AC; Tube Feeding Type: Continuous; Continuous Tube Feeding Start Rate (mL/hr): 25; Then Advance Rate By (mL/hr): 25; Every __ Hours: 4; To Goal Rate of (mL/hr): 55       Malnutrition Severity Assessment                Nutrition Diagnosis         Nutrition Dx Problem 1 No nutrition diagnosis at this time.     Nutrition Intervention         No nutrition intervention at this time.      Medical Nutrition Therapy/Nutrition Education          Learner     Readiness N/A  Education not indicated at this time     Method     Response N/A  N/A     Monitor/Evaluation        Monitor Per protocol, PO intake, Pertinent labs, Weight, POC/GOC     Nutrition Discharge Plan         To be determined     Electronically signed by:  Meredith Garcia RD  05/29/23 13:44 EDT

## 2023-05-29 NOTE — PROGRESS NOTES
[facility]   Hospitalist Progress Note  Date: 2023  Patient Name: Isha Llanes  : 1965  MRN: [id number]  Date of admission: 2023      Subjective   Subjective     Chief Complaint: Follow-up altered mental status    Summary:Isha Llanes is a 57 y.o. female   history of aortic stenosis status post TAVR, Hodgkin's lymphoma, recently diagnosed throat cancer, lupus, chronic back pain post intrathecal pain pump implant brought into the emergency department for evaluation of altered mental status.    Patient found unconscious in her kitchen.  She was slumped over in her wheelchair upon arrival.  Patient is wheelchair-bound at baseline. EMS was called, was transported to emergency department for evaluation.  Daughter reports that the patient had defecated on herself at the time of being found.  Daughter reports that the patient has a left upper chest wall Mediport, and history of persistently positive blood cultures, requiring frequent IV antibiotic infusions.  She currently the process of getting the Mediport removed as it is inaccessible.  Last known normal was the patient was able for presentation.  In the emergency department patient found to be initially afebrile developed a temperature of 102, sinus tach 90s to 100 on telemetry review, hypotensive 80s over 50s, requiring 2 L nasal cannula keep sats greater than 90%.  ABG showed respiratory compensation of metabolic acidosis, troponin elevated greater than 300 initially.  EKG negative for acute ischemic changes.  Redemonstrated chronic left bundle branch block.  Creatinine elevated 2.07, lactate greater than 9, ALT AST mildly elevated Pro-Ge greater than 400.  Urinalysis negative for pyuria.  Tox screen positive for opioids.  Patient does have a Dilaudid pain pump.  CT head negative for any acute intracranial abnormalities.  CT abdomen pelvis demonstrated hepatic steatosis nonobstructive right renal stone and mild pelvic ascites.  CT  chest demonstrated patchy bilateral consolidations worse in the left upper lobe concerning for multifocal pneumonia.  Also noted to have a small right pleural effusion and moderate emphysema.  Patient bolused IV fluids started on empiric antibiotics after cultures were drawn.  Patient admitted to ICU for further evaluation and treatment of septic shock thought secondary to multifocal pneumonia with associated encephalopathy, acute kidney injury and NSTEMI.  Pulmonology critical care consulted.  Continued on empiric antibiotics.  Despite volume resuscitation blood pressure remained low required initiation of Levophed and vasopressin. Also started on midodrine.  Renal function returned to baseline with IV fluids.  Wound culture right calf positive for MSSA. Antibiotics deescalated and tailored to cover multifocal pneumonia and MSSA. Mental status improved. Respiratory status declined. Repeat imaging concerning for multifocal pneumonia with pulmonary edema.  Diuresed with IV Lasix. Slowly weaned off levophed and vasopressin and transferred out of the ICU.  Cardiology recommending cardiac cath once stable.  Patient wanting to discharge home ultimately.  During work-up hep C antibody positive.  Follow-up HCV RNA by PCR pending.    Interval Followup: Patient lying in bed appears to be resting comfortably.  Patient remains alert oriented today.  Patient continues to complain of back pain.  Also complaining of indigestion and sore throat.  Patient afebrile overnight.  Sinus rhythm 80s to 100s on telemetry review.  Able to be weaned off of Levophed yesterday afternoon.  Remains on midodrine.  O2 requirement improved to 1 L nasal cannula.  Blood sugars within normal limits.  Creatinine remains at baseline.  LFTs remain mildly elevate though trending down.   Wound culture growing MSSA.  Respiratory panel negative.  Blood cultures negative to date.  Sputum culture rejected.  No other issues per nursing.    Review of  Systems  Constitutional: Positive for fatigue and negative fever.   HENT: Positive for sore throat and negative trouble swallowing.    Eyes: Negative for pain and discharge.   Respiratory: Positive for cough and shortness of breath.    Cardiovascular: Negative for chest pain and palpitations.   Gastrointestinal: Negative for abdominal pain, positive nausea and vomiting.   Endocrine: Negative for cold intolerance and heat intolerance.   Genitourinary: Negative for difficulty urinating and dysuria.   Musculoskeletal: Negative for back pain and neck stiffness.   Skin: Positive for venous stasis ulcer  Neurological: Negative for syncope and headaches.   Hematological: Negative for adenopathy.   Psychiatric/Behavioral: Negative for confusion and negative hallucinations.    Objective   Objective     Vitals:   Temp:  [97.1 °F (36.2 °C)-98.7 °F (37.1 °C)] 97.1 °F (36.2 °C)  Heart Rate:  [82-96] 86  Resp:  [15-21] 18  BP: ()/(46-80) 102/58  Flow (L/min):  [1-3] 1  Physical Exam   Gen. well-developed appearing stated age in no acute distress  HEENT: Normocephalic atraumatic moist membranes pupils equal round reactive light, no scleral icterus no conjunctival injection  Cardiovascular: regular tachycardic, 1+ lower extremity edema appreciated  Pulmonary: Crackles bilateral posterior lung fields, no wheezes rales or rhonchi symmetric chest expansion, unlabored, no conversational dyspnea appreciated  Gastrointestinal: Soft nontender nondistended positive bowel sounds all 4 quadrants no rebound or guarding  Musculoskeletal: No clubbing cyanosis, warm and well-perfused, calves soft symmetric nontender bilaterally  Skin: Clean dry chronic skin changes bilateral lower extremities, venous stasis ulcer of the right calf  Neuro: Cranial nerves II through XII intact grossly no sensorimotor deficits appreciated bilateral upper and lower extremities  Psych: Patient is sleepy though able to wake up and is cooperative and appropriate  with exam not responding to internal stimuli  : No Keating catheter no bladder distention no suprapubic tenderness    Result Review    Result Review:  I have personally reviewed these results and agree with these findings:  [x]  Laboratory  LAB RESULTS:      Lab 05/29/23  0604 05/28/23  1049 05/28/23  0829 05/28/23  0808 05/27/23  0613 05/27/23  0102 05/26/23  1828 05/26/23  1200 05/26/23  0708 05/26/23  0455 05/26/23  0142   WBC  --   --  16.33*  --  19.09*  --   --   --   --   --  10.29   HEMOGLOBIN  --   --  10.9*  --  11.6*  --   --   --   --   --  15.1   HEMATOCRIT  --   --  35.2  --  35.1  --   --   --   --   --  46.4   PLATELETS  --   --  48*  --  50*  --   --   --   --   --  95*   NEUTROS ABS  --   --   --   --  18.14*  --   --   --   --   --  9.34*   IMMATURE GRANS (ABS)  --   --   --   --   --   --   --   --   --   --  0.11*   LYMPHS ABS  --   --   --   --   --   --   --   --   --   --  0.67*   MONOS ABS  --   --   --   --   --   --   --   --   --   --  0.10   EOS ABS  --   --   --   --   --   --   --   --   --   --  0.04   MCV  --   --  88.0  --  82.8  --   --   --   --   --  85.0   PROCALCITONIN  --   --   --  >100.00*  --   --   --   --   --   --  >400.00*   LACTATE  --   --   --   --   --  3.0* 3.7* 4.8* 3.1* 4.9* 9.6*   LACTATE, ARTERIAL  --   --   --   --   --   --   --   --   --   --  6.56*   PROTIME  --   --   --   --   --   --   --   --   --   --  17.1*   APTT 31.0* 34.6*  --   --  41.3*  --   --   --  37.3*  --   --          Lab 05/29/23  0604 05/28/23  0808 05/27/23  0613 05/26/23  0708 05/26/23  0142   SODIUM 139 138 138 136 137   SODIUM, ARTERIAL  --   --   --   --  134.6*   POTASSIUM 3.6 3.8 3.8 3.5 3.4*   CHLORIDE 105 106 107 103 96*   CO2 27.0 21.7* 22.7 17.9* 19.0*   ANION GAP 7.0 10.3 8.3 15.1* 22.0*   BUN 14 14 19 30* 25*   CREATININE 0.57 0.49* 0.57 1.32* 2.07*   EGFR 106.1 110.1 106.1 47.2* 27.5*   GLUCOSE 146* 163* 116* 168* 172*   GLUCOSE, ARTERIAL  --   --   --   --  181*   CALCIUM  8.0* 8.0* 7.7* 8.0* 9.6   IONIZED CALCIUM  --   --   --   --  1.11*   MAGNESIUM 1.7 2.2 2.4 1.4*  --    PHOSPHORUS  --  1.7* 3.1  --   --    TSH  --   --   --  1.100  --          Lab 05/29/23  0604 05/28/23  0808 05/27/23  0613 05/26/23  1200 05/26/23  0142   TOTAL PROTEIN 5.5* 5.6* 5.8*  --  7.2   ALBUMIN 2.9* 2.5* 2.9*  --  3.8   GLOBULIN 2.6 3.1 2.9  --  3.4   ALT (SGPT) 72* 102* 132*  --  99*   AST (SGOT) 44* 91* 177*  --  192*   BILIRUBIN 0.6 0.6 0.6  --  1.4*   ALK PHOS 102 93 89  --  129*   LIPASE  --   --   --  8*  --          Lab 05/27/23  0613 05/26/23  0338 05/26/23  0142   PROBNP 12,931.0*  --   --    HSTROP T  --  256* 303*   PROTIME  --   --  17.1*   INR  --   --  1.40*                 Lab 05/26/23  0142   PH, ARTERIAL 7.429   PCO2, ARTERIAL 29.8*   PO2 ART 78.0*   O2 SATURATION ART 95.3   FIO2 28   HCO3 ART 19.3*   BASE EXCESS ART -3.7*   CARBOXYHEMOGLOBIN 2.0*     Brief Urine Lab Results  (Last result in the past 365 days)      Color   Clarity   Blood   Leuk Est   Nitrite   Protein   CREAT   Urine HCG        05/28/23 0405 Yellow   Clear   Small (1+)   Negative   Negative   30 mg/dL (1+)               Microbiology Results (last 10 days)     Procedure Component Value - Date/Time    Respiratory Culture - Sputum, Cough [[id number]] Collected: 05/28/23 0648    Lab Status: Final result Specimen: Sputum from Cough Updated: 05/28/23 0820     Respiratory Culture Rejected     Gram Stain Rare (1+) Epithelial cells seen      Rare (1+) WBCs seen      No organisms seen    Narrative:      Specimen rejected due to oropharyngeal contamination. Please reorder and recollect specimen if clinically necessary.    Respiratory Panel PCR w/COVID-19(SARS-CoV-2) TANIKA/MARY/MINNIE/PAD/COR/MAD/ELLEN In-House, NP Swab in UTM/VTM, 3-4 HR TAT - Swab, Nasopharynx [[id number]]  (Normal) Collected: 05/27/23 3613    Lab Status: Final result Specimen: Swab from Nasopharynx Updated: 05/27/23 1104     ADENOVIRUS, PCR Not Detected     Coronavirus  229E Not Detected     Coronavirus HKU1 Not Detected     Coronavirus NL63 Not Detected     Coronavirus OC43 Not Detected     COVID19 Not Detected     Human Metapneumovirus Not Detected     Human Rhinovirus/Enterovirus Not Detected     Influenza A PCR Not Detected     Influenza B PCR Not Detected     Parainfluenza Virus 1 Not Detected     Parainfluenza Virus 2 Not Detected     Parainfluenza Virus 3 Not Detected     Parainfluenza Virus 4 Not Detected     RSV, PCR Not Detected     Bordetella pertussis pcr Not Detected     Bordetella parapertussis PCR Not Detected     Chlamydophila pneumoniae PCR Not Detected     Mycoplasma pneumo by PCR Not Detected    Narrative:      In the setting of a positive respiratory panel with a viral infection PLUS a negative procalcitonin without other underlying concern for bacterial infection, consider observing off antibiotics or discontinuation of antibiotics and continue supportive care. If the respiratory panel is positive for atypical bacterial infection (Bordetella pertussis, Chlamydophila pneumoniae, or Mycoplasma pneumoniae), consider antibiotic de-escalation to target atypical bacterial infection.    MRSA Screen, PCR (Inpatient) - Swab, Nares [[id number]]  (Normal) Collected: 05/26/23 1640    Lab Status: Final result Specimen: Swab from Nares Updated: 05/26/23 1808     MRSA PCR No MRSA Detected    Narrative:      The negative predictive value of this diagnostic test is high and should only be used to consider de-escalating anti-MRSA therapy. A positive result may indicate colonization with MRSA and must be correlated clinically.    Wound Culture - Swab, Leg, Right [[id number]]  (Abnormal)  (Susceptibility) Collected: 05/26/23 0305    Lab Status: Final result Specimen: Swab from Leg, Right Updated: 05/28/23 0848     Wound Culture Moderate growth (3+) Staphylococcus aureus     Gram Stain Many (4+) WBCs seen      Few (2+) Gram positive cocci in pairs    Susceptibility       Staphylococcus aureus      MARTHA      Clindamycin Susceptible      Erythromycin Susceptible      Inducible Clindamycin Resistance Negative      Oxacillin Susceptible      Rifampin Susceptible      Tetracycline Susceptible      Trimethoprim + Sulfamethoxazole Susceptible      Vancomycin Susceptible                       Susceptibility Comments     Staphylococcus aureus    This isolate does not demonstrate inducible clindamycin resistance in vitro.               S. Pneumo Ag Urine or CSF - Urine, Straight Cath [[id number]]  (Normal) Collected: 05/26/23 0225    Lab Status: Final result Specimen: Urine from Straight Cath Updated: 05/26/23 1208     Strep Pneumo Ag Negative    Legionella Antigen, Urine - Urine, Straight Cath [[id number]]  (Normal) Collected: 05/26/23 0225    Lab Status: Final result Specimen: Urine from Straight Cath Updated: 05/26/23 1208     LEGIONELLA ANTIGEN, URINE Negative    Blood Culture - Blood, Arm, Left [[id number]]  (Normal) Collected: 05/26/23 0142    Lab Status: Preliminary result Specimen: Blood from Arm, Left Updated: 05/29/23 0200     Blood Culture No growth at 3 days    Blood Culture - Blood, Arm, Left [[id number]]  (Normal) Collected: 05/26/23 0142    Lab Status: Preliminary result Specimen: Blood from Arm, Left Updated: 05/29/23 0200     Blood Culture No growth at 3 days          [x]  Microbiology  [x]  Radiology  CT Abdomen Pelvis Without Contrast    Result Date: 5/26/2023    1. Hepatic steatosis. 2. Nonobstructing right renal stone. 3. Mild pelvic ascites.     MORALES TESFAYE MD       Electronically Signed and Approved By: MORALES TESFAYE MD on 5/26/2023 at 17:55             CT Head Without Contrast    Result Date: 5/26/2023   No acute brain abnormality is seen. Specifically, no acute intracranial hemorrhage, no acute infarct, no significant intracranial mass lesion, and no acute intracranial mass effect are appreciated.   The study is motion-limited.    Please note that portions of this  note were completed with a voice recognition program.  JEN JONES JR, MD       Electronically Signed and Approved By: JEN JONES JR, MD on 5/26/2023 at 2:22              CT Chest Without Contrast Diagnostic    Result Date: 5/26/2023    1. Patchy bilateral consolidations worse in the left upper lobe, suggesting multifocal pneumonia. 2. Small right pleural effusion with bibasilar atelectasis. 3. Moderate emphysema.     MORALES TESFAYE MD       Electronically Signed and Approved By: MORALES TESFAYE MD on 5/26/2023 at 17:50             XR Chest 1 View    Result Date: 5/28/2023    Multifocal patchy airspace opacities present bilaterally, likely related to pneumonia.  This is likely superimposed on a mild pulmonary edema pattern.  No significant pleural effusion.       MELANIE CHOE MD       Electronically Signed and Approved By: MELANIE CHOE MD on 5/28/2023 at 9:24             XR Chest 1 View    Result Date: 5/26/2023    No acute infiltrate is appreciated.    Please note that portions of this note were completed with a voice recognition program.  JEN JONES JR, MD       Electronically Signed and Approved By: JEN JONES JR, MD on 5/26/2023 at 2:27                [x]  EKG/Telemetry   [x]  Cardiology/Vascular   Echocardiogram 5/26/2023    •  Left ventricular ejection fraction appears to be 46 - 50%.  •  Left ventricular wall thickness is consistent with mild concentric hypertrophy.  •  Left ventricular diastolic function was indeterminate.  •  There is a TAVR valve present.  •  Moderate to severe tricuspid valve regurgitation is present.  •  Estimated right ventricular systolic pressure from tricuspid regurgitation is mildly elevated (35-45 mmHg).    []  Pathology  []  Old records  [x]  Other:  Scheduled Meds:atorvastatin, 80 mg, Oral, Nightly  cefTRIAXone, 1 g, Intravenous, Q24H  [START ON 5/30/2023] clopidogrel, 75 mg, Oral, Daily  enoxaparin, 40 mg, Subcutaneous, Q24H  furosemide, 40 mg, Intravenous,  BID  gabapentin, 100 mg, Nasogastric, Q8H  insulin detemir, 10 Units, Subcutaneous, Nightly  insulin regular, 2-7 Units, Subcutaneous, Q6H  midodrine, 10 mg, Oral, TID AC  nicotine, 1 patch, Transdermal, Q24H  saccharomyces boulardii, 250 mg, Oral, BID  senna-docusate sodium, 2 tablet, Oral, BID  sodium chloride, 10 mL, Intravenous, Q12H      Continuous Infusions:pain,   Pharmacy to Dose enoxaparin (LOVENOX),       PRN Meds:.•  acetaminophen  •  albuterol  •  senna-docusate sodium **AND** polyethylene glycol **AND** [DISCONTINUED] bisacodyl **AND** bisacodyl  •  dextrose  •  dextrose  •  First Mouthwash (Magic Mouthwash)  •  glucagon (human recombinant)  •  haloperidol lactate  •  HYDROmorphone  •  loperamide  •  LORazepam  •  melatonin  •  nitroglycerin  •  ondansetron  •  Pharmacy to Dose enoxaparin (LOVENOX)  •  phenol  •  sodium chloride  •  sodium chloride      Assessment & Plan   Assessment / Plan     Assessment/Plan:  Septic shock  Lactic acidosis  Acute metabolic encephalopathy secondary to septic shock  Multifocal pneumonia  MSSA infection of right venous stasis ulcer  Acute on chronic hypoxic respiratory failure  NSTEMI secondary to septic shock  Acute kidney injury secondary to septic shock  Acute systolic congestive heart failure  Transaminitis  Lactic acidosis  Hepatic cirrhosis  Hep C antibody positive  Insulin-dependent diabetes mellitus  Diabetic neuropathy  Aortic stenosis status post TAVR  Hodgkin's lymphoma  Recently diagnosed throat cancer  Chronic back pain status post pain pump implant  Hypokalemia  Hypomagnesemia  Thrombocytopenia concern secondary to cirrhosis  Wheelchair-bound        Patient admitted for further evaluation and treatment  Pulmonology critical care and cardiology consulted thank you for assistance  Change antibiotics to ceftriaxone for MSSA wound infection and multifocal pneumonia coverage per pulmonology.  Continue midodrine and titrate as needed  Continue regular  reorientation  Continue bronchopulmonary hygiene protocol  Continue wound care  Continue supplemental nasal cannula oxygen titrated keep sats greater than 90%  Continue Plavix and atorvastatin  Cardiology recommending cardiac catheterization once stable  Continue furosemide 40 mg IV twice daily  Continue monitor renal function during diuresis  Continue to monitor electrolytes and replace as needed  Continue to monitor transaminases  Follow-up hep C RNA by PCR  Consult speech therapy  Advance diet per speech  DC NG and tube feeds  Continue Levemir and titrate as needed  Continue sliding scale insulin  Patient will need to follow-up with oncology as an outpatient once stable  Patient's pain pump controller currently not available  Continue IV Dilaudid as needed to avoid withdrawal symptoms  Continue to monitor electrolytes and replace as needed  Continue monitor thrombocytopenia suspect secondary to splenic sequestration with sepsis  Further inpatient orders recommendations pending clinical course           Discussed plan with bedside RN as well as pulmonology critical care team.    Disposition: Patient does not want to pursue rehab.  Wishing to return Home following further cardiac work-up.    DVT prophylaxis:  Medical DVT prophylaxis orders are present.    CODE STATUS:   Level Of Support Discussed With: Next of Kin (If No Surrogate)  Code Status (Patient has no pulse and is not breathing): CPR (Attempt to Resuscitate)  Medical Interventions (Patient has pulse or is breathing): Full Support  Comments: daughter at bedside confirmed code status

## 2023-05-30 PROBLEM — I21.9 ACUTE MI: Status: ACTIVE | Noted: 2023-05-30

## 2023-05-30 LAB
ALBUMIN SERPL-MCNC: 3 G/DL (ref 3.5–5.2)
ALBUMIN/GLOB SERPL: 1 G/DL
ALP SERPL-CCNC: 104 U/L (ref 39–117)
ALT SERPL W P-5'-P-CCNC: 48 U/L (ref 1–33)
ANION GAP SERPL CALCULATED.3IONS-SCNC: 7.1 MMOL/L (ref 5–15)
APTT PPP: 26.4 SECONDS (ref 78–95.9)
AST SERPL-CCNC: 26 U/L (ref 1–32)
BILIRUB SERPL-MCNC: 0.4 MG/DL (ref 0–1.2)
BUN SERPL-MCNC: 13 MG/DL (ref 6–20)
BUN/CREAT SERPL: 24.5 (ref 7–25)
CALCIUM SPEC-SCNC: 8 MG/DL (ref 8.6–10.5)
CHLORIDE SERPL-SCNC: 105 MMOL/L (ref 98–107)
CO2 SERPL-SCNC: 27.9 MMOL/L (ref 22–29)
CREAT SERPL-MCNC: 0.53 MG/DL (ref 0.57–1)
DEPRECATED RDW RBC AUTO: 45.1 FL (ref 37–54)
EGFRCR SERPLBLD CKD-EPI 2021: 108 ML/MIN/1.73
ERYTHROCYTE [DISTWIDTH] IN BLOOD BY AUTOMATED COUNT: 14.9 % (ref 12.3–15.4)
GLOBULIN UR ELPH-MCNC: 3 GM/DL
GLUCOSE BLDC GLUCOMTR-MCNC: 103 MG/DL (ref 70–99)
GLUCOSE BLDC GLUCOMTR-MCNC: 111 MG/DL (ref 70–99)
GLUCOSE BLDC GLUCOMTR-MCNC: 113 MG/DL (ref 70–99)
GLUCOSE BLDC GLUCOMTR-MCNC: 123 MG/DL (ref 70–99)
GLUCOSE BLDC GLUCOMTR-MCNC: 147 MG/DL (ref 70–99)
GLUCOSE SERPL-MCNC: 154 MG/DL (ref 65–99)
HCT VFR BLD AUTO: 31.6 % (ref 34–46.6)
HGB BLD-MCNC: 10.4 G/DL (ref 12–15.9)
MAGNESIUM SERPL-MCNC: 1.7 MG/DL (ref 1.6–2.6)
MCH RBC QN AUTO: 27.1 PG (ref 26.6–33)
MCHC RBC AUTO-ENTMCNC: 32.9 G/DL (ref 31.5–35.7)
MCV RBC AUTO: 82.3 FL (ref 79–97)
PHOSPHATE SERPL-MCNC: 3 MG/DL (ref 2.5–4.5)
PLATELET # BLD AUTO: 59 10*3/MM3 (ref 140–450)
PMV BLD AUTO: 13 FL (ref 6–12)
POTASSIUM SERPL-SCNC: 3.7 MMOL/L (ref 3.5–5.2)
PROT SERPL-MCNC: 6 G/DL (ref 6–8.5)
RBC # BLD AUTO: 3.84 10*6/MM3 (ref 3.77–5.28)
SODIUM SERPL-SCNC: 140 MMOL/L (ref 136–145)
WBC NRBC COR # BLD: 5.44 10*3/MM3 (ref 3.4–10.8)

## 2023-05-30 PROCEDURE — 94799 UNLISTED PULMONARY SVC/PX: CPT

## 2023-05-30 PROCEDURE — 25010000002 CEFTRIAXONE PER 250 MG: Performed by: FAMILY MEDICINE

## 2023-05-30 PROCEDURE — 99232 SBSQ HOSP IP/OBS MODERATE 35: CPT | Performed by: INTERNAL MEDICINE

## 2023-05-30 PROCEDURE — 25010000002 ONDANSETRON PER 1 MG: Performed by: FAMILY MEDICINE

## 2023-05-30 PROCEDURE — 82948 REAGENT STRIP/BLOOD GLUCOSE: CPT

## 2023-05-30 PROCEDURE — 92526 ORAL FUNCTION THERAPY: CPT

## 2023-05-30 PROCEDURE — 85730 THROMBOPLASTIN TIME PARTIAL: CPT | Performed by: FAMILY MEDICINE

## 2023-05-30 PROCEDURE — 25010000002 FUROSEMIDE PER 20 MG: Performed by: FAMILY MEDICINE

## 2023-05-30 PROCEDURE — 63710000001 INSULIN DETEMIR PER 5 UNITS: Performed by: FAMILY MEDICINE

## 2023-05-30 PROCEDURE — 85027 COMPLETE CBC AUTOMATED: CPT | Performed by: FAMILY MEDICINE

## 2023-05-30 PROCEDURE — 83735 ASSAY OF MAGNESIUM: CPT | Performed by: FAMILY MEDICINE

## 2023-05-30 PROCEDURE — 84100 ASSAY OF PHOSPHORUS: CPT | Performed by: FAMILY MEDICINE

## 2023-05-30 PROCEDURE — 80053 COMPREHEN METABOLIC PANEL: CPT | Performed by: FAMILY MEDICINE

## 2023-05-30 PROCEDURE — 25010000002 HYDROMORPHONE 1 MG/ML SOLUTION: Performed by: FAMILY MEDICINE

## 2023-05-30 PROCEDURE — 99233 SBSQ HOSP IP/OBS HIGH 50: CPT | Performed by: INTERNAL MEDICINE

## 2023-05-30 RX ORDER — SODIUM CHLORIDE 9 MG/ML
75 INJECTION, SOLUTION INTRAVENOUS CONTINUOUS
Status: DISCONTINUED | OUTPATIENT
Start: 2023-05-30 | End: 2023-05-31 | Stop reason: HOSPADM

## 2023-05-30 RX ORDER — FLUCONAZOLE 150 MG/1
150 TABLET ORAL ONCE
Status: COMPLETED | OUTPATIENT
Start: 2023-05-30 | End: 2023-05-30

## 2023-05-30 RX ADMIN — Medication 10 ML: at 10:08

## 2023-05-30 RX ADMIN — DOCUSATE SODIUM 50MG AND SENNOSIDES 8.6MG 2 TABLET: 8.6; 5 TABLET, FILM COATED ORAL at 20:19

## 2023-05-30 RX ADMIN — FUROSEMIDE 40 MG: 10 INJECTION, SOLUTION INTRAMUSCULAR; INTRAVENOUS at 17:15

## 2023-05-30 RX ADMIN — HYDROMORPHONE HYDROCHLORIDE 0.5 MG: 1 INJECTION, SOLUTION INTRAMUSCULAR; INTRAVENOUS; SUBCUTANEOUS at 00:32

## 2023-05-30 RX ADMIN — ONDANSETRON 4 MG: 2 INJECTION INTRAMUSCULAR; INTRAVENOUS at 22:26

## 2023-05-30 RX ADMIN — MIDODRINE HYDROCHLORIDE 10 MG: 10 TABLET ORAL at 09:19

## 2023-05-30 RX ADMIN — PHENOL 1 SPRAY: 1.5 LIQUID ORAL at 17:16

## 2023-05-30 RX ADMIN — HYDROMORPHONE HYDROCHLORIDE 0.5 MG: 1 INJECTION, SOLUTION INTRAMUSCULAR; INTRAVENOUS; SUBCUTANEOUS at 09:42

## 2023-05-30 RX ADMIN — NICOTINE 1 PATCH: 21 PATCH, EXTENDED RELEASE TRANSDERMAL at 09:19

## 2023-05-30 RX ADMIN — HYDROMORPHONE HYDROCHLORIDE 0.5 MG: 1 INJECTION, SOLUTION INTRAMUSCULAR; INTRAVENOUS; SUBCUTANEOUS at 12:36

## 2023-05-30 RX ADMIN — HYDROMORPHONE HYDROCHLORIDE 0.5 MG: 1 INJECTION, SOLUTION INTRAMUSCULAR; INTRAVENOUS; SUBCUTANEOUS at 06:35

## 2023-05-30 RX ADMIN — LOPERAMIDE HYDROCHLORIDE 2 MG: 2 CAPSULE ORAL at 09:42

## 2023-05-30 RX ADMIN — FUROSEMIDE 40 MG: 10 INJECTION, SOLUTION INTRAMUSCULAR; INTRAVENOUS at 09:20

## 2023-05-30 RX ADMIN — HYDROMORPHONE HYDROCHLORIDE 0.5 MG: 1 INJECTION, SOLUTION INTRAMUSCULAR; INTRAVENOUS; SUBCUTANEOUS at 20:19

## 2023-05-30 RX ADMIN — Medication 10 ML: at 09:42

## 2023-05-30 RX ADMIN — Medication 250 MG: at 21:41

## 2023-05-30 RX ADMIN — GABAPENTIN 100 MG: 100 CAPSULE ORAL at 13:09

## 2023-05-30 RX ADMIN — GABAPENTIN 100 MG: 100 CAPSULE ORAL at 05:44

## 2023-05-30 RX ADMIN — MIDODRINE HYDROCHLORIDE 10 MG: 10 TABLET ORAL at 10:41

## 2023-05-30 RX ADMIN — Medication 10 ML: at 20:20

## 2023-05-30 RX ADMIN — GABAPENTIN 100 MG: 100 CAPSULE ORAL at 21:41

## 2023-05-30 RX ADMIN — ONDANSETRON 4 MG: 2 INJECTION INTRAMUSCULAR; INTRAVENOUS at 16:24

## 2023-05-30 RX ADMIN — HYDROMORPHONE HYDROCHLORIDE 0.5 MG: 1 INJECTION, SOLUTION INTRAMUSCULAR; INTRAVENOUS; SUBCUTANEOUS at 17:15

## 2023-05-30 RX ADMIN — INSULIN DETEMIR 10 UNITS: 100 INJECTION, SOLUTION SUBCUTANEOUS at 20:19

## 2023-05-30 RX ADMIN — ACETAMINOPHEN 650 MG: 325 TABLET ORAL at 04:26

## 2023-05-30 RX ADMIN — Medication 250 MG: at 09:19

## 2023-05-30 RX ADMIN — Medication 10 ML: at 09:20

## 2023-05-30 RX ADMIN — ATORVASTATIN CALCIUM 80 MG: 40 TABLET, FILM COATED ORAL at 20:19

## 2023-05-30 RX ADMIN — MIDODRINE HYDROCHLORIDE 10 MG: 10 TABLET ORAL at 17:15

## 2023-05-30 RX ADMIN — CEFTRIAXONE SODIUM 1 G: 1 INJECTION, SOLUTION INTRAVENOUS at 09:19

## 2023-05-30 RX ADMIN — DIPHENHYDRAMINE HYDROCHLORIDE AND LIDOCAINE HYDROCHLORIDE AND ALUMINUM HYDROXIDE AND MAGNESIUM HYDRO 5 ML: KIT at 12:41

## 2023-05-30 RX ADMIN — HYDROMORPHONE HYDROCHLORIDE 0.5 MG: 1 INJECTION, SOLUTION INTRAMUSCULAR; INTRAVENOUS; SUBCUTANEOUS at 03:26

## 2023-05-30 RX ADMIN — HYDROMORPHONE HYDROCHLORIDE 0.5 MG: 1 INJECTION, SOLUTION INTRAMUSCULAR; INTRAVENOUS; SUBCUTANEOUS at 22:26

## 2023-05-30 RX ADMIN — DIPHENHYDRAMINE HYDROCHLORIDE AND LIDOCAINE HYDROCHLORIDE AND ALUMINUM HYDROXIDE AND MAGNESIUM HYDRO 5 ML: KIT at 03:26

## 2023-05-30 RX ADMIN — ACETAMINOPHEN 650 MG: 325 TABLET ORAL at 17:15

## 2023-05-30 RX ADMIN — CLOPIDOGREL BISULFATE 75 MG: 75 TABLET ORAL at 09:19

## 2023-05-30 RX ADMIN — FLUCONAZOLE 150 MG: 150 TABLET ORAL at 21:41

## 2023-05-30 NOTE — PLAN OF CARE
Goal Outcome Evaluation:      Patient medicated for pain per mar. Patient noting significant pain in her lower extremities. MD aware, no new orders were placed. Tried warm rags on lower legs per patient request. Patient is resting at this time. VSS. Call light within reach.

## 2023-05-30 NOTE — THERAPY TREATMENT NOTE
Acute Care - Speech Language Pathology   Swallow Treatment Note JEMMA Go     Patient Name: Isha Llanes  : 1965  MRN: [id number]  Today's Date: 2023               Admit Date: 2023    Visit Dx:     ICD-10-CM ICD-9-CM   1. Acute myocardial infarction involving left anterior descending (LAD) coronary artery, unspecified MI type  I21.02 410.11   2. Septic shock  A41.9 038.9    R65.21 785.52     995.92   3. Lactic acidosis  E87.20 276.2   4. Elevated troponin  R77.8 790.6   5. Acute renal failure, unspecified acute renal failure type  N17.9 584.9   6. Acute metabolic encephalopathy  G93.41 348.31   7. Oropharyngeal dysphagia  R13.12 787.22     Patient Active Problem List   Diagnosis   • Septic shock   • Acute MI     Past Medical History:   Diagnosis Date   • Arthritis    • Asthma    • Cancer    • COPD (chronic obstructive pulmonary disease)    • Coronary artery disease    • Diabetes mellitus    • GERD (gastroesophageal reflux disease)    • History of transfusion    • Hypertension    • Sleep apnea    • Stroke      Past Surgical History:   Procedure Laterality Date   • ABDOMINAL SURGERY     • BACK SURGERY     • CARDIAC CATHETERIZATION     • CARDIAC SURGERY     • COLONOSCOPY     • ENDOSCOPY     • HYSTERECTOMY     • SKIN BIOPSY     • TONSILLECTOMY       SPEECH PATHOLOGY DYSPHAGIA TREATMENT    Subjective/Behavioral Observations: Awake, cooperative.  Improvement in mental status.  No longer complaining of nausea.          Day/time of Treatment: 2023        Current Diet: Mechanical soft, thin liquids        Treatment received: Treatment focused on tolerance of current diet recommendations and use of compensatory strategies to decrease risk of aspiration.        Results of treatment: Patient feeding self with assist.  Single sips of thin liquids with swallows completed with mild delay.  Vocal quality and laryngeal sounds clear.  Soft solids with extended chewing.  Swallows completed.  Occasional  double swallow.  Patient complaining of throat soreness.  No overt signs of aspiration observed during bedside treatment.        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  May benefit from modified barium swallow study for further investigation of swallow function; especially with new onset diagnosis of epiglottic cancer ( per patient report).                SLP Recommendation and Plan                                                                                       EDUCATION  The patient has been educated in the following areas:   Dysphagia (Swallowing Impairment).              Time Calculation:    Time Calculation- SLP     Row Name 05/30/23 1151             Time Calculation- SLP    SLP Stop Time 1145  -SN      SLP Received On 05/30/23  -SN         Untimed Charges    75775-AZ Treatment Swallow Minutes 45  -SN         Total Minutes    Untimed Charges Total Minutes 45  -SN       Total Minutes 45  -SN            User Key  (r) = Recorded By, (t) = Taken By, (c) = Cosigned By    Initials Name Provider Type    SN Tawana Petersen MS-CCC/SLP, SANDRA Speech and Language Pathologist                Therapy Charges for Today     Code Description Service Date Service Provider Modifiers Qty    [id number]  ST TREATMENT SWALLOW 3 5/30/2023 Tawana Petersen MS-CCC/SLP, SANDRA GN 1               CHICO Dolan/SANDRA VARGAS  5/30/2023

## 2023-05-30 NOTE — PROGRESS NOTES
Pulmonary / Critical Care Progress Note      Patient Name: Isha Llanes  : 1965  MRN: [id number]  Attending:  Bull Davila MD   Date of admission: 2023    Subjective   Subjective     Follow up for septic shock, MSSA wound, multifocal pneumonia.    Over past 24 hours, transferred out of the ICU.    No acute events overnight.    This morning,  On 1 L nasal cannula  Turned O2 off  BP stable  Afebrile  Continues to diurese well  -1.2 L fluid balance  No chest pain  Weak and fatigued    Review of Systems  Constitutional symptoms: Very weak and fatigued  Ear, nose, throat: Denied complaints  Cardiovascular:   Leg swelling, otherwise denied complaints  Respiratory: Congested, nonproductive cough  Gastrointestinal: Denied complaints  Musculoskeletal: Denied complaints  Neurologic: Denied complaints  Skin: Denied complaints  Objective   Objective     Vitals:   Vital signs for last 24 hours:  Temp:  [97.2 °F (36.2 °C)-98.9 °F (37.2 °C)] 97.2 °F (36.2 °C)  Heart Rate:  [] 85  Resp:  [16-26] 26  BP: ()/(53-78) 140/78      Physical Exam   Vital Signs Reviewed   General:  Alert, NAD.  Chronically ill-appearing female lying in bed  HEENT:  PERRL, EOMI.   Neck:  No JVD, no thyromegaly  Lymph: no axillary, cervical, supraclavicular lymphadenopathy noted bilaterally  Chest:  Clear to auscultation bilaterally, no work of breathing noted on room air  CV: RRR, no M/G/R, pulses 2+  Abd:  Soft, NT, ND, +BS  EXT:  no clubbing, no cyanosis, lower extremity edema  Neuro:  A&Ox3, CN grossly intact, no focal deficits  Skin: No rashes or lesions noted  Result Review    Result Review:  I have personally reviewed the results from the time of this admission to 2023 13:52 EDT and agree with these findings:  [x]  Laboratory  [x]  Microbiology  [x]  Radiology  []  EKG/Telemetry   [x]  Cardiology/Vascular   []  Pathology  []  Old records  []  Other:  Most notable findings include:       Lab 23  0636  05/30/23  0635 05/29/23  0604 05/28/23  0829 05/28/23  0808 05/27/23  0613 05/26/23  0708 05/26/23  0142   WBC 5.44  --   --  16.33*  --  19.09*  --  10.29   HEMOGLOBIN 10.4*  --   --  10.9*  --  11.6*  --  15.1   HEMATOCRIT 31.6*  --   --  35.2  --  35.1  --  46.4   PLATELETS 59*  --   --  48*  --  50*  --  95*   SODIUM  --  140 139  --  138 138 136 137   SODIUM, ARTERIAL  --   --   --   --   --   --   --  134.6*   POTASSIUM  --  3.7 3.6  --  3.8 3.8 3.5 3.4*   CHLORIDE  --  105 105  --  106 107 103 96*   CO2  --  27.9 27.0  --  21.7* 22.7 17.9* 19.0*   BUN  --  13 14  --  14 19 30* 25*   CREATININE  --  0.53* 0.57  --  0.49* 0.57 1.32* 2.07*   GLUCOSE  --  154* 146*  --  163* 116* 168* 172*   GLUCOSE, ARTERIAL  --   --   --   --   --   --   --  181*   CALCIUM  --  8.0* 8.0*  --  8.0* 7.7* 8.0* 9.6   PHOSPHORUS  --  3.0  --   --  1.7* 3.1  --   --    TOTAL PROTEIN  --  6.0 5.5*  --  5.6* 5.8*  --  7.2   ALBUMIN  --  3.0* 2.9*  --  2.5* 2.9*  --  3.8   GLOBULIN  --  3.0 2.6  --  3.1 2.9  --  3.4     Assessment & Plan   Assessment / Plan     Active Hospital Problems:  Active Hospital Problems    Diagnosis    • **Septic shock    • Acute MI      Impression:  Septic shock, present on admission  Multifocal pneumonia from unspecified organism  Altered mental status  Toxic/metabolic encephalopathy  Anion gap metabolic acidosis  Lactic acidosis, clinically significant  Acute kidney injury, prerenal  Hypotension  Hypokalemia  Hypomagnesia   Thrombocytopenia  Transaminitis  Hep C antibody positive  Type 2 diabetes with hyperglycemia  Recent diagnosis of throat cancer  History of lymphoma  NSTEMI, likely demand type II  Severe aortic stenosis status post TAVR  Acute decompensated diastolic congestive heart failure  Volume overload  MSSA wound infection     Plan:  -On room air.  -Continue bronchopulmonary hygiene. Encourage I-S and flutter valve.  -Speech therapy on board and appreciate assistance.  Does have risk of  aspiration.  Continue mechanical soft with thin liquids.  -Continue aspiration precautions.  -Continue ceftriaxone per primary for MSSA wound infection.  -Continue Lasix 40 mg IV twice daily.  Trend renal panel and electrolytes.  -Hep C antibody was positive, pending HCV RNA quantitative of blood.  -Encourage mobilization.  Out of bed to chair.  -PT/OT on board.    -Unless otherwise needed, pulmonary will sign off at this time.  Please call with any questions.    DVT prophylaxis:   Medical DVT prophylaxis orders are present.    CODE STATUS:   Level Of Support Discussed With: Next of Kin (If No Surrogate)  Code Status (Patient has no pulse and is not breathing): CPR (Attempt to Resuscitate)  Medical Interventions (Patient has pulse or is breathing): Full Support  Comments: daughter at bedside confirmed code status    Labs, microbiology, radiology, medications, and provider notes personally reviewed.  Discussed with primary services and bedside RN.    Electronically signed by LUZ Dumont, 05/30/23, 1:52 PM EDT.    This visit was performed by BOTH a physician and an APC I spent more than 51% of time caring for this patient. I personally evaluated and examined the patient. I performed all aspects of MDM as documented. , I have reviewed and confirmed the accuracy of the patient's history as documented in this note. and I have reexamined the patient and the results are consistent with the previously documented exam. I have updated the documentation as necessary    Electronically signed by Goyo Hall DO, 05/30/23, 4:21 PM EDT.

## 2023-05-30 NOTE — SIGNIFICANT NOTE
Wound Eval / Progress Noted    JEMMA Go     Patient Name: Isha Llanes  : 1965  MRN: [id number]  Today's Date: 2023                 Admit Date: 2023    Visit Dx:    ICD-10-CM ICD-9-CM   1. Acute myocardial infarction involving left anterior descending (LAD) coronary artery, unspecified MI type  I21.02 410.11   2. Septic shock  A41.9 038.9    R65.21 785.52     995.92   3. Lactic acidosis  E87.20 276.2   4. Elevated troponin  R77.8 790.6   5. Acute renal failure, unspecified acute renal failure type  N17.9 584.9   6. Acute metabolic encephalopathy  G93.41 348.31   7. Oropharyngeal dysphagia  R13.12 787.22       Patient Active Problem List   Diagnosis   • Septic shock   • Acute MI        Past Medical History:   Diagnosis Date   • Arthritis    • Asthma    • Cancer    • COPD (chronic obstructive pulmonary disease)    • Coronary artery disease    • Diabetes mellitus    • GERD (gastroesophageal reflux disease)    • History of transfusion    • Hypertension    • Sleep apnea    • Stroke         Past Surgical History:   Procedure Laterality Date   • ABDOMINAL SURGERY     • BACK SURGERY     • CARDIAC CATHETERIZATION     • CARDIAC SURGERY     • COLONOSCOPY     • ENDOSCOPY     • HYSTERECTOMY     • SKIN BIOPSY     • TONSILLECTOMY           Physical Assessment:  Wound 23 0140 Left lower leg Diabetic Ulcer (Active)   Wound Image   23 1240   Dressing Appearance open to air 23 1240   Closure None 23 1240   Base black eschar;necrotic;dry 23 1240   Black (%), Wound Tissue Color 100 23 1240   Periwound intact;edematous;redness 23 1240   Periwound Temperature cool 23 1240   Periwound Skin Turgor firm 23 1240   Edges rolled/closed 23 1240   Drainage Amount none 23 1240   Care, Wound cleansed with;sterile normal saline 23 1240   Dressing Care dressing applied;dressing moistened;silver impregnated;hydrofiber;silicone;border dressing 23 1240    Periwound Care absorptive dressing applied 05/30/23 1240       Wound 05/26/23 0140 Left calf Diabetic Ulcer (Active)   Dressing Appearance open to air 05/30/23 1240   Closure None 05/30/23 1240   Base black eschar;necrotic;dry 05/30/23 1240   Black (%), Wound Tissue Color 100 05/30/23 1240   Periwound edematous;dry;redness;intact 05/30/23 1240   Periwound Temperature warm 05/30/23 1240   Periwound Skin Turgor firm 05/30/23 1240   Edges rolled/closed 05/30/23 1240   Drainage Amount none 05/30/23 1240   Care, Wound cleansed with;sterile normal saline 05/30/23 1240   Dressing Care dressing applied;dressing moistened;silver impregnated;hydrofiber;silicone;border dressing 05/30/23 1240   Periwound Care absorptive dressing applied 05/30/23 1240       Wound 05/26/23 0140 Left calf Diabetic Ulcer (Active)   Wound Image   05/30/23 1240   Dressing Appearance open to air 05/30/23 1240   Closure None 05/30/23 1240   Base black eschar;dry;necrotic 05/30/23 1240   Black (%), Wound Tissue Color 100 05/30/23 1240   Periwound intact;dry;redness;edematous 05/30/23 1240   Periwound Temperature warm 05/30/23 1240   Periwound Skin Turgor firm 05/30/23 1240   Edges rolled/closed 05/30/23 1240   Drainage Amount none 05/30/23 1240   Care, Wound cleansed with;sterile normal saline 05/30/23 1240   Dressing Care dressing applied;dressing moistened;silver impregnated;hydrofiber;silicone;border dressing 05/30/23 1240   Periwound Care absorptive dressing applied 05/30/23 1240       Wound 05/26/23 0140 Right calf Diabetic Ulcer (Active)   Wound Image   05/30/23 1240   Dressing Appearance open to air 05/30/23 1240   Closure None 05/30/23 1240   Base black eschar;necrotic;dry 05/30/23 1240   Black (%), Wound Tissue Color 100 05/30/23 1240   Periwound intact;dry;redness;edematous 05/30/23 1240   Periwound Temperature cool 05/30/23 1240   Periwound Skin Turgor firm 05/30/23 1240   Edges rolled/closed 05/30/23 1240   Drainage Amount none 05/30/23  1240   Care, Wound cleansed with;sterile normal saline 05/30/23 1240   Dressing Care dressing applied;dressing moistened;silver impregnated;hydrofiber;silicone;border dressing 05/30/23 1240   Periwound Care absorptive dressing applied 05/30/23 1240       Wound 05/26/23 0140 Right calf (Active)   Dressing Appearance open to air 05/30/23 1240   Closure None 05/30/23 1240   Base black eschar;necrotic;dry 05/30/23 1240   Black (%), Wound Tissue Color 100 05/30/23 1240   Periwound intact;dry;redness;edematous 05/30/23 1240   Periwound Temperature warm 05/30/23 1240   Periwound Skin Turgor firm 05/30/23 1240   Edges rolled/closed 05/30/23 1240   Drainage Amount none 05/30/23 1240   Care, Wound cleansed with;sterile normal saline 05/30/23 1240   Dressing Care dressing applied;dressing moistened;silver impregnated;hydrofiber;silicone;border dressing 05/30/23 1240   Periwound Care absorptive dressing applied 05/30/23 1240       Wound 05/26/23 0140 Right anterior great toe Diabetic Ulcer (Active)   Wound Image   05/30/23 1240   Dressing Appearance open to air 05/30/23 1240   Closure None 05/30/23 1240   Base dry;maroon/purple 05/30/23 1240   Periwound intact;dry;redness 05/30/23 1240   Periwound Temperature warm 05/30/23 1240   Periwound Skin Turgor firm 05/30/23 1240   Edges rolled/closed 05/30/23 1240   Drainage Amount none 05/30/23 1240   Care, Wound cleansed with;sterile normal saline 05/30/23 1240   Dressing Care open to air;skin barrier agent applied 05/30/23 1240   Periwound Care barrier ointment applied 05/30/23 1240        Wound Check / Follow-up:  Patient seen today for a wound consult. Patient with soft tissue necrosis to the bilateral lower legs with black softened eschar to the wounds. Periwound is edematous and reddened with areas of pink epithelium noted. No drainage noted at this time. Patient reports that she has struggled to get the wounds healed to her legs and used to be seen at the wound clinic for  treatment. Cleansed all wounds with NS and gauze. Recommending application of silver impregnated hydrofiber to the wounds and secure with silicone border dressings. Keep legs elevated at all times and offload heels with pillows. Buttocks is intact and blanchable.   Maroon/purple intact tissue noted to the right great toe. Periwound is reddened. Cleansed with NS. Recommend application of silver impregnated hydrofiber and secure with a dry gauze.  Implement Q2H turns and offload heels at all times. Provide quality skin care and hygiene. Keep patient clean and free from all moisture.    Impression: soft tissue necrosis to bilateral lower legs    Short term goals: regain skin integrity, pressure reduction, skin protection, moisture management, daily dressing changes    Isha Hughes RN    5/30/2023    13:31 EDT

## 2023-05-30 NOTE — PROGRESS NOTES
[facility]   Hospitalist Progress Note  Date: 2023  Patient Name: Isha Llanes  : 1965  MRN: [id number]  Date of admission: 2023      Subjective   Subjective     Chief Complaint: Follow-up altered mental status    Summary:Isha Llanes is a 57 y.o. female   history of aortic stenosis status post TAVR, Hodgkin's lymphoma, recently diagnosed throat cancer, lupus, chronic back pain post intrathecal pain pump implant brought into the emergency department for evaluation of altered mental status.    Patient found unconscious in her kitchen.  She was slumped over in her wheelchair upon arrival.  Patient is wheelchair-bound at baseline. EMS was called, was transported to emergency department for evaluation.  Daughter reports that the patient had defecated on herself at the time of being found.  Daughter reports that the patient has a left upper chest wall Mediport, and history of persistently positive blood cultures, requiring frequent IV antibiotic infusions.  She currently the process of getting the Mediport removed as it is inaccessible.  Last known normal was the patient was able for presentation.  In the emergency department patient found to be initially afebrile developed a temperature of 102, sinus tach 90s to 100 on telemetry review, hypotensive 80s over 50s, requiring 2 L nasal cannula keep sats greater than 90%.  ABG showed respiratory compensation of metabolic acidosis, troponin elevated greater than 300 initially.  EKG negative for acute ischemic changes.  Redemonstrated chronic left bundle branch block.  Creatinine elevated 2.07, lactate greater than 9, ALT AST mildly elevated Pro-Ge greater than 400.  Urinalysis negative for pyuria.  Tox screen positive for opioids.  Patient does have a Dilaudid pain pump.  CT head negative for any acute intracranial abnormalities.  CT abdomen pelvis demonstrated hepatic steatosis nonobstructive right renal stone and mild pelvic ascites.  CT  chest demonstrated patchy bilateral consolidations worse in the left upper lobe concerning for multifocal pneumonia.  Also noted to have a small right pleural effusion and moderate emphysema.  Patient bolused IV fluids started on empiric antibiotics after cultures were drawn.  Patient admitted to ICU for further evaluation and treatment of septic shock thought secondary to multifocal pneumonia with associated encephalopathy, acute kidney injury and NSTEMI.  Pulmonology critical care consulted.  Continued on empiric antibiotics.  Despite volume resuscitation blood pressure remained low required initiation of Levophed and vasopressin. Also started on midodrine.  Renal function returned to baseline with IV fluids.  Wound culture right calf positive for MSSA. Antibiotics deescalated and tailored to cover multifocal pneumonia and MSSA. Mental status improved. Respiratory status declined. Repeat imaging concerning for multifocal pneumonia with pulmonary edema.  Diuresed with IV Lasix. Slowly weaned off levophed and vasopressin and transferred out of the ICU.  Cardiology recommending cardiac cath once stable.  Patient wanting to discharge home ultimately.  During work-up hep C antibody positive.  Follow-up HCV RNA by PCR pending.    Interval Followup: Overnight patient complaining of significant amounts of diarrhea, no nausea or vomiting, did complain of lower extremity pain bilaterally,      Objective   Objective     Vitals:   Temp:  [97.1 °F (36.2 °C)-98.9 °F (37.2 °C)] 98.2 °F (36.8 °C)  Heart Rate:  [] 80  Resp:  [16-20] 20  BP: ()/(53-74) 121/74  Flow (L/min):  [1] 1  Physical Exam   GEN: No acute distress   HEENT: Moist mucous membranes  LUNGS: Equal chest rise bilaterally  CARDIAC: Regular rate and rhythm  NEURO: Global weakness  SKIN: Bilateral wounds on the calf    Result Review    Result Review:  I have personally reviewed these results and agree with these findings:  [x]  Laboratory  LAB RESULTS:       Lab 05/30/23  0636 05/30/23  0635 05/29/23  0604 05/28/23  1049 05/28/23  0829 05/28/23  0808 05/27/23  0613 05/27/23  0102 05/26/23  1828 05/26/23  1200 05/26/23  0708 05/26/23  0455 05/26/23  0142   WBC 5.44  --   --   --  16.33*  --  19.09*  --   --   --   --   --  10.29   HEMOGLOBIN 10.4*  --   --   --  10.9*  --  11.6*  --   --   --   --   --  15.1   HEMATOCRIT 31.6*  --   --   --  35.2  --  35.1  --   --   --   --   --  46.4   PLATELETS 59*  --   --   --  48*  --  50*  --   --   --   --   --  95*   NEUTROS ABS  --   --   --   --   --   --  18.14*  --   --   --   --   --  9.34*   IMMATURE GRANS (ABS)  --   --   --   --   --   --   --   --   --   --   --   --  0.11*   LYMPHS ABS  --   --   --   --   --   --   --   --   --   --   --   --  0.67*   MONOS ABS  --   --   --   --   --   --   --   --   --   --   --   --  0.10   EOS ABS  --   --   --   --   --   --   --   --   --   --   --   --  0.04   MCV 82.3  --   --   --  88.0  --  82.8  --   --   --   --   --  85.0   PROCALCITONIN  --   --   --   --   --  >100.00*  --   --   --   --   --   --  >400.00*   LACTATE  --   --   --   --   --   --   --  3.0* 3.7* 4.8* 3.1* 4.9* 9.6*   LACTATE, ARTERIAL  --   --   --   --   --   --   --   --   --   --   --   --  6.56*   PROTIME  --   --   --   --   --   --   --   --   --   --   --   --  17.1*   APTT  --  26.4* 31.0* 34.6*  --   --  41.3*  --   --   --  37.3*  --   --          Lab 05/30/23  0635 05/29/23  0604 05/28/23  0808 05/27/23  0613 05/26/23  0708 05/26/23  0142   SODIUM 140 139 138 138 136 137   SODIUM, ARTERIAL  --   --   --   --   --  134.6*   POTASSIUM 3.7 3.6 3.8 3.8 3.5 3.4*   CHLORIDE 105 105 106 107 103 96*   CO2 27.9 27.0 21.7* 22.7 17.9* 19.0*   ANION GAP 7.1 7.0 10.3 8.3 15.1* 22.0*   BUN 13 14 14 19 30* 25*   CREATININE 0.53* 0.57 0.49* 0.57 1.32* 2.07*   EGFR 108.0 106.1 110.1 106.1 47.2* 27.5*   GLUCOSE 154* 146* 163* 116* 168* 172*   GLUCOSE, ARTERIAL  --   --   --   --   --  181*   CALCIUM 8.0* 8.0*  8.0* 7.7* 8.0* 9.6   IONIZED CALCIUM  --   --   --   --   --  1.11*   MAGNESIUM 1.7 1.7 2.2 2.4 1.4*  --    PHOSPHORUS 3.0  --  1.7* 3.1  --   --    TSH  --   --   --   --  1.100  --          Lab 05/30/23  0635 05/29/23  0604 05/28/23  0808 05/27/23  0613 05/26/23  1200 05/26/23  0142   TOTAL PROTEIN 6.0 5.5* 5.6* 5.8*  --  7.2   ALBUMIN 3.0* 2.9* 2.5* 2.9*  --  3.8   GLOBULIN 3.0 2.6 3.1 2.9  --  3.4   ALT (SGPT) 48* 72* 102* 132*  --  99*   AST (SGOT) 26 44* 91* 177*  --  192*   BILIRUBIN 0.4 0.6 0.6 0.6  --  1.4*   ALK PHOS 104 102 93 89  --  129*   LIPASE  --   --   --   --  8*  --          Lab 05/29/23  1909 05/29/23  1658 05/27/23  0613 05/26/23  0338 05/26/23  0142   PROBNP  --   --  12,931.0*  --   --    HSTROP T 81* 77*  --  256* 303*   PROTIME  --   --   --   --  17.1*   INR  --   --   --   --  1.40*                 Lab 05/26/23  0142   PH, ARTERIAL 7.429   PCO2, ARTERIAL 29.8*   PO2 ART 78.0*   O2 SATURATION ART 95.3   FIO2 28   HCO3 ART 19.3*   BASE EXCESS ART -3.7*   CARBOXYHEMOGLOBIN 2.0*     Brief Urine Lab Results  (Last result in the past 365 days)      Color   Clarity   Blood   Leuk Est   Nitrite   Protein   CREAT   Urine HCG        05/28/23 0405 Yellow   Clear   Small (1+)   Negative   Negative   30 mg/dL (1+)               Microbiology Results (last 10 days)     Procedure Component Value - Date/Time    Respiratory Culture - Sputum, Cough [[id number]] Collected: 05/28/23 0648    Lab Status: Final result Specimen: Sputum from Cough Updated: 05/28/23 0861     Respiratory Culture Rejected     Gram Stain Rare (1+) Epithelial cells seen      Rare (1+) WBCs seen      No organisms seen    Narrative:      Specimen rejected due to oropharyngeal contamination. Please reorder and recollect specimen if clinically necessary.    Respiratory Panel PCR w/COVID-19(SARS-CoV-2) TANIKA/MARY/MINNIE/PAD/COR/MAD/ELLEN In-House, NP Swab in UTM/VTM, 3-4 HR TAT - Swab, Nasopharynx [[id number]]  (Normal) Collected: 05/27/23 0481     Lab Status: Final result Specimen: Swab from Nasopharynx Updated: 05/27/23 1104     ADENOVIRUS, PCR Not Detected     Coronavirus 229E Not Detected     Coronavirus HKU1 Not Detected     Coronavirus NL63 Not Detected     Coronavirus OC43 Not Detected     COVID19 Not Detected     Human Metapneumovirus Not Detected     Human Rhinovirus/Enterovirus Not Detected     Influenza A PCR Not Detected     Influenza B PCR Not Detected     Parainfluenza Virus 1 Not Detected     Parainfluenza Virus 2 Not Detected     Parainfluenza Virus 3 Not Detected     Parainfluenza Virus 4 Not Detected     RSV, PCR Not Detected     Bordetella pertussis pcr Not Detected     Bordetella parapertussis PCR Not Detected     Chlamydophila pneumoniae PCR Not Detected     Mycoplasma pneumo by PCR Not Detected    Narrative:      In the setting of a positive respiratory panel with a viral infection PLUS a negative procalcitonin without other underlying concern for bacterial infection, consider observing off antibiotics or discontinuation of antibiotics and continue supportive care. If the respiratory panel is positive for atypical bacterial infection (Bordetella pertussis, Chlamydophila pneumoniae, or Mycoplasma pneumoniae), consider antibiotic de-escalation to target atypical bacterial infection.    MRSA Screen, PCR (Inpatient) - Swab, Nares [[id number]]  (Normal) Collected: 05/26/23 1640    Lab Status: Final result Specimen: Swab from Nares Updated: 05/26/23 1808     MRSA PCR No MRSA Detected    Narrative:      The negative predictive value of this diagnostic test is high and should only be used to consider de-escalating anti-MRSA therapy. A positive result may indicate colonization with MRSA and must be correlated clinically.    Wound Culture - Swab, Leg, Right [[id number]]  (Abnormal)  (Susceptibility) Collected: 05/26/23 0305    Lab Status: Final result Specimen: Swab from Leg, Right Updated: 05/28/23 0848     Wound Culture Moderate growth (3+)  Staphylococcus aureus     Gram Stain Many (4+) WBCs seen      Few (2+) Gram positive cocci in pairs    Susceptibility      Staphylococcus aureus      MARTHA      Clindamycin Susceptible      Erythromycin Susceptible      Inducible Clindamycin Resistance Negative      Oxacillin Susceptible      Rifampin Susceptible      Tetracycline Susceptible      Trimethoprim + Sulfamethoxazole Susceptible      Vancomycin Susceptible                       Susceptibility Comments     Staphylococcus aureus    This isolate does not demonstrate inducible clindamycin resistance in vitro.               S. Pneumo Ag Urine or CSF - Urine, Straight Cath [[id number]]  (Normal) Collected: 05/26/23 0225    Lab Status: Final result Specimen: Urine from Straight Cath Updated: 05/26/23 1208     Strep Pneumo Ag Negative    Legionella Antigen, Urine - Urine, Straight Cath [[id number]]  (Normal) Collected: 05/26/23 0225    Lab Status: Final result Specimen: Urine from Straight Cath Updated: 05/26/23 1208     LEGIONELLA ANTIGEN, URINE Negative    Blood Culture - Blood, Arm, Left [[id number]]  (Normal) Collected: 05/26/23 0142    Lab Status: Preliminary result Specimen: Blood from Arm, Left Updated: 05/30/23 0201     Blood Culture No growth at 4 days    Blood Culture - Blood, Arm, Left [[id number]]  (Normal) Collected: 05/26/23 0142    Lab Status: Preliminary result Specimen: Blood from Arm, Left Updated: 05/30/23 0201     Blood Culture No growth at 4 days          [x]  Microbiology  [x]  Radiology  CT Abdomen Pelvis Without Contrast    Result Date: 5/26/2023    1. Hepatic steatosis. 2. Nonobstructing right renal stone. 3. Mild pelvic ascites.     MORALES TESFAYE MD       Electronically Signed and Approved By: MORALES TESFAYE MD on 5/26/2023 at 17:55             CT Head Without Contrast    Result Date: 5/26/2023   No acute brain abnormality is seen. Specifically, no acute intracranial hemorrhage, no acute infarct, no significant intracranial mass lesion,  and no acute intracranial mass effect are appreciated.   The study is motion-limited.    Please note that portions of this note were completed with a voice recognition program.  JEN JONES JR, MD       Electronically Signed and Approved By: JEN JONES JR, MD on 5/26/2023 at 2:22              CT Chest Without Contrast Diagnostic    Result Date: 5/26/2023    1. Patchy bilateral consolidations worse in the left upper lobe, suggesting multifocal pneumonia. 2. Small right pleural effusion with bibasilar atelectasis. 3. Moderate emphysema.     MORALES TESFAYE MD       Electronically Signed and Approved By: MORALES TESFAYE MD on 5/26/2023 at 17:50             XR Chest 1 View    Result Date: 5/28/2023    Multifocal patchy airspace opacities present bilaterally, likely related to pneumonia.  This is likely superimposed on a mild pulmonary edema pattern.  No significant pleural effusion.       MELANIE CHOE MD       Electronically Signed and Approved By: MELANIE CHOE MD on 5/28/2023 at 9:24             XR Chest 1 View    Result Date: 5/26/2023    No acute infiltrate is appreciated.    Please note that portions of this note were completed with a voice recognition program.  JEN JONES JR, MD       Electronically Signed and Approved By: JEN JONES JR, MD on 5/26/2023 at 2:27                [x]  EKG/Telemetry   [x]  Cardiology/Vascular   Echocardiogram 5/26/2023    •  Left ventricular ejection fraction appears to be 46 - 50%.  •  Left ventricular wall thickness is consistent with mild concentric hypertrophy.  •  Left ventricular diastolic function was indeterminate.  •  There is a TAVR valve present.  •  Moderate to severe tricuspid valve regurgitation is present.  •  Estimated right ventricular systolic pressure from tricuspid regurgitation is mildly elevated (35-45 mmHg).    []  Pathology  []  Old records  [x]  Other:  Scheduled Meds:atorvastatin, 80 mg, Oral, Nightly  cefTRIAXone, 1 g, Intravenous,  Q24H  clopidogrel, 75 mg, Oral, Daily  enoxaparin, 40 mg, Subcutaneous, Q24H  furosemide, 40 mg, Intravenous, BID  gabapentin, 100 mg, Oral, Q8H  insulin detemir, 10 Units, Subcutaneous, Nightly  insulin regular, 2-7 Units, Subcutaneous, Q6H  midodrine, 10 mg, Oral, TID AC  nicotine, 1 patch, Transdermal, Q24H  saccharomyces boulardii, 250 mg, Oral, BID  senna-docusate sodium, 2 tablet, Oral, BID  sodium chloride, 10 mL, Intravenous, Q12H      Continuous Infusions:pain,   Pharmacy to Dose enoxaparin (LOVENOX),   sodium chloride, 75 mL/hr      PRN Meds:.•  acetaminophen  •  albuterol  •  senna-docusate sodium **AND** polyethylene glycol **AND** [DISCONTINUED] bisacodyl **AND** bisacodyl  •  dextrose  •  dextrose  •  First Mouthwash (Magic Mouthwash)  •  glucagon (human recombinant)  •  haloperidol lactate  •  HYDROmorphone  •  loperamide  •  LORazepam  •  melatonin  •  nitroglycerin  •  ondansetron  •  Pharmacy to Dose enoxaparin (LOVENOX)  •  phenol  •  sodium chloride  •  sodium chloride      Assessment & Plan   Assessment / Plan     Assessment/Plan:  Septic shock  Lactic acidosis  Acute metabolic encephalopathy secondary to septic shock  Multifocal pneumonia  MSSA infection of right venous stasis ulcer  Acute on chronic hypoxic respiratory failure  NSTEMI secondary to septic shock  Acute kidney injury secondary to septic shock  Acute systolic congestive heart failure  Transaminitis  Lactic acidosis  Hepatic cirrhosis  Hep C antibody positive  Insulin-dependent diabetes mellitus  Diabetic neuropathy  Aortic stenosis status post TAVR  Hodgkin's lymphoma  Recently diagnosed throat cancer  Chronic back pain status post pain pump implant  Hypokalemia  Hypomagnesemia  Thrombocytopenia concern secondary to cirrhosis  Wheelchair-bound    Patient admitted for further evaluation and treatment  Pulmonology critical care and cardiology consulted thank you for assistance  Continue Rocephin for wound infection  Continue  midodrine for low blood pressure  Continue supplemental nasal cannula oxygen titrated keep sats greater than 90%  Continue Plavix and atorvastatin, tolerating   Cardiology recommending cath prior to DC  Continue furosemide 40 mg IV twice daily, monitor electrolytes and renal function closely  Hep C RNA PCR pending  Advance diet as tolerated by speech  Continue basal bolus insulin  Patient will need to follow-up with oncology as an outpatient once stable  Patient's pain pump controller currently not available  Continue IV Dilaudid as needed to avoid withdrawal symptoms  Continue monitor thrombocytopenia suspect secondary to splenic sequestration with sepsis  Chest x-ray personally reviewed patchy airspace opacities likely secondary to multifocal pneumonia  CBC, CMP reviewed  Repeat CBC, CMP, mag and Phos in a.m.    Reviewed patients labs and imaging, and discussed with patient and nurse at bedside.    DVT prophylaxis:  Medical DVT prophylaxis orders are present.    CODE STATUS:   Level Of Support Discussed With: Next of Kin (If No Surrogate)  Code Status (Patient has no pulse and is not breathing): CPR (Attempt to Resuscitate)  Medical Interventions (Patient has pulse or is breathing): Full Support  Comments: daughter at bedside confirmed code status      Electronically signed by Bull Davila MD, 05/30/23, 9:52 AM EDT.

## 2023-05-30 NOTE — PLAN OF CARE
Goal Outcome Evaluation:      Pt to have a cardiac cath tomorrow. Pt knows to be NPO after midnight. Wound care came to see patient. No acute changes this shift.

## 2023-05-30 NOTE — PROGRESS NOTES
Date of service: 5/30/2023    Subjective: No chest pain, SOB, palpitations or lightheadedness.    Review of systems: Still having diarrhea but much less than before.  No headache, vertigo, nausea, vomiting, abdominal pain fever, chills, TIA or CVA-like symptoms    Examinations: She was in no pain or respiratory distress  Vital signs: Reviewed  HEENT: Sclera nonicteric.  Neck: No JVD or carotid bruit  Chest:  CTA.  Cardiac: RRR.  2/6 stock murmur over the left sternal border.  No S3 gallop.  Abdomen: Soft and nontender  Extremities: 1/4 bilateral lower leg and ankle edema.  Chronic dermatitic skin changes  Neuro: Alert, oriented, no facial droop and speech was clear     Records reviewed.     Assessment and plan:     1.  Acute non-STEMI.    2.  Status post TAVR.  3.  Septic shock.    4.  Acute kidney injury, improved  5.  Altered mental state, improved  6.  Hypokalemia, corrected  7.  Cardiac catheterization in the a.m.

## 2023-05-31 ENCOUNTER — READMISSION MANAGEMENT (OUTPATIENT)
Dept: CALL CENTER | Facility: HOSPITAL | Age: 58
End: 2023-05-31

## 2023-05-31 VITALS
DIASTOLIC BLOOD PRESSURE: 75 MMHG | OXYGEN SATURATION: 97 % | SYSTOLIC BLOOD PRESSURE: 123 MMHG | BODY MASS INDEX: 27.25 KG/M2 | WEIGHT: 159.61 LBS | HEIGHT: 64 IN | RESPIRATION RATE: 20 BRPM | HEART RATE: 102 BPM | TEMPERATURE: 98.4 F

## 2023-05-31 LAB
ALBUMIN SERPL-MCNC: 3.2 G/DL (ref 3.5–5.2)
ALBUMIN/GLOB SERPL: 1 G/DL
ALP SERPL-CCNC: 104 U/L (ref 39–117)
ALT SERPL W P-5'-P-CCNC: 45 U/L (ref 1–33)
ANION GAP SERPL CALCULATED.3IONS-SCNC: 9.4 MMOL/L (ref 5–15)
APTT PPP: 27.7 SECONDS (ref 78–95.9)
AST SERPL-CCNC: 33 U/L (ref 1–32)
BACTERIA SPEC AEROBE CULT: NORMAL
BACTERIA SPEC AEROBE CULT: NORMAL
BASOPHILS # BLD AUTO: 0.01 10*3/MM3 (ref 0–0.2)
BASOPHILS NFR BLD AUTO: 0.2 % (ref 0–1.5)
BILIRUB SERPL-MCNC: 0.3 MG/DL (ref 0–1.2)
BUN SERPL-MCNC: 10 MG/DL (ref 6–20)
BUN/CREAT SERPL: 23.3 (ref 7–25)
CALCIUM SPEC-SCNC: 8.4 MG/DL (ref 8.6–10.5)
CHLORIDE SERPL-SCNC: 102 MMOL/L (ref 98–107)
CO2 SERPL-SCNC: 24.6 MMOL/L (ref 22–29)
CREAT SERPL-MCNC: 0.43 MG/DL (ref 0.57–1)
DACRYOCYTES BLD QL SMEAR: NORMAL
DEPRECATED RDW RBC AUTO: 44.3 FL (ref 37–54)
EGFRCR SERPLBLD CKD-EPI 2021: 113.6 ML/MIN/1.73
EOSINOPHIL # BLD AUTO: 0.06 10*3/MM3 (ref 0–0.4)
EOSINOPHIL NFR BLD AUTO: 1.3 % (ref 0.3–6.2)
ERYTHROCYTE [DISTWIDTH] IN BLOOD BY AUTOMATED COUNT: 14.7 % (ref 12.3–15.4)
GIANT PLATELETS: NORMAL
GLOBULIN UR ELPH-MCNC: 3.2 GM/DL
GLUCOSE BLDC GLUCOMTR-MCNC: 106 MG/DL (ref 70–99)
GLUCOSE BLDC GLUCOMTR-MCNC: 138 MG/DL (ref 70–99)
GLUCOSE BLDC GLUCOMTR-MCNC: 83 MG/DL (ref 70–99)
GLUCOSE BLDC GLUCOMTR-MCNC: 87 MG/DL (ref 70–99)
GLUCOSE BLDC GLUCOMTR-MCNC: 97 MG/DL (ref 70–99)
GLUCOSE SERPL-MCNC: 89 MG/DL (ref 65–99)
HCT VFR BLD AUTO: 38 % (ref 34–46.6)
HCV GENTYP SERPL NAA+PROBE: NORMAL
HCV RNA SERPL NAA+PROBE-ACNC: NORMAL IU/ML
HCV RNA SERPL NAA+PROBE-LOG IU: NORMAL LOG10 IU/ML
HGB BLD-MCNC: 12.1 G/DL (ref 12–15.9)
IMM GRANULOCYTES # BLD AUTO: 0.17 10*3/MM3 (ref 0–0.05)
IMM GRANULOCYTES NFR BLD AUTO: 3.8 % (ref 0–0.5)
LABORATORY COMMENT REPORT: NORMAL
LARGE PLATELETS: NORMAL
LYMPHOCYTES # BLD AUTO: 0.83 10*3/MM3 (ref 0.7–3.1)
LYMPHOCYTES NFR BLD AUTO: 18.4 % (ref 19.6–45.3)
MAGNESIUM SERPL-MCNC: 1.8 MG/DL (ref 1.6–2.6)
MCH RBC QN AUTO: 26.7 PG (ref 26.6–33)
MCHC RBC AUTO-ENTMCNC: 31.8 G/DL (ref 31.5–35.7)
MCV RBC AUTO: 83.7 FL (ref 79–97)
MONOCYTES # BLD AUTO: 0.49 10*3/MM3 (ref 0.1–0.9)
MONOCYTES NFR BLD AUTO: 10.9 % (ref 5–12)
NEUTROPHILS NFR BLD AUTO: 2.95 10*3/MM3 (ref 1.7–7)
NEUTROPHILS NFR BLD AUTO: 65.4 % (ref 42.7–76)
NRBC BLD AUTO-RTO: 0 /100 WBC (ref 0–0.2)
OVALOCYTES BLD QL SMEAR: NORMAL
PHOSPHATE SERPL-MCNC: 3.3 MG/DL (ref 2.5–4.5)
PLATELET # BLD AUTO: 91 10*3/MM3 (ref 140–450)
PMV BLD AUTO: 12.3 FL (ref 6–12)
POIKILOCYTOSIS BLD QL SMEAR: NORMAL
POLYCHROMASIA BLD QL SMEAR: NORMAL
POTASSIUM SERPL-SCNC: 4.3 MMOL/L (ref 3.5–5.2)
PROT SERPL-MCNC: 6.4 G/DL (ref 6–8.5)
RBC # BLD AUTO: 4.54 10*6/MM3 (ref 3.77–5.28)
SMALL PLATELETS BLD QL SMEAR: NORMAL
SODIUM SERPL-SCNC: 136 MMOL/L (ref 136–145)
WBC MORPH BLD: NORMAL
WBC NRBC COR # BLD: 4.51 10*3/MM3 (ref 3.4–10.8)

## 2023-05-31 PROCEDURE — 99152 MOD SED SAME PHYS/QHP 5/>YRS: CPT | Performed by: INTERNAL MEDICINE

## 2023-05-31 PROCEDURE — 83735 ASSAY OF MAGNESIUM: CPT | Performed by: INTERNAL MEDICINE

## 2023-05-31 PROCEDURE — 93454 CORONARY ARTERY ANGIO S&I: CPT | Performed by: INTERNAL MEDICINE

## 2023-05-31 PROCEDURE — 25010000002 MIDAZOLAM PER 1MG: Performed by: INTERNAL MEDICINE

## 2023-05-31 PROCEDURE — 25010000002 FENTANYL CITRATE (PF) 50 MCG/ML SOLUTION: Performed by: INTERNAL MEDICINE

## 2023-05-31 PROCEDURE — 85007 BL SMEAR W/DIFF WBC COUNT: CPT | Performed by: INTERNAL MEDICINE

## 2023-05-31 PROCEDURE — 25010000002 HEPARIN (PORCINE) PER 1000 UNITS: Performed by: INTERNAL MEDICINE

## 2023-05-31 PROCEDURE — 94799 UNLISTED PULMONARY SVC/PX: CPT

## 2023-05-31 PROCEDURE — C1894 INTRO/SHEATH, NON-LASER: HCPCS | Performed by: INTERNAL MEDICINE

## 2023-05-31 PROCEDURE — 25010000002 CEFTRIAXONE PER 250 MG: Performed by: FAMILY MEDICINE

## 2023-05-31 PROCEDURE — 25010000002 LORAZEPAM PER 2 MG: Performed by: INTERNAL MEDICINE

## 2023-05-31 PROCEDURE — 85025 COMPLETE CBC W/AUTO DIFF WBC: CPT | Performed by: INTERNAL MEDICINE

## 2023-05-31 PROCEDURE — 84100 ASSAY OF PHOSPHORUS: CPT | Performed by: INTERNAL MEDICINE

## 2023-05-31 PROCEDURE — 25010000002 HYDROMORPHONE PER 4 MG: Performed by: INTERNAL MEDICINE

## 2023-05-31 PROCEDURE — 4A023N7 MEASUREMENT OF CARDIAC SAMPLING AND PRESSURE, LEFT HEART, PERCUTANEOUS APPROACH: ICD-10-PCS | Performed by: INTERNAL MEDICINE

## 2023-05-31 PROCEDURE — 80053 COMPREHEN METABOLIC PANEL: CPT | Performed by: INTERNAL MEDICINE

## 2023-05-31 PROCEDURE — 99233 SBSQ HOSP IP/OBS HIGH 50: CPT | Performed by: INTERNAL MEDICINE

## 2023-05-31 PROCEDURE — 25010000002 HYDROMORPHONE 1 MG/ML SOLUTION: Performed by: FAMILY MEDICINE

## 2023-05-31 PROCEDURE — 25510000001 IOPAMIDOL PER 1 ML: Performed by: INTERNAL MEDICINE

## 2023-05-31 PROCEDURE — C1769 GUIDE WIRE: HCPCS | Performed by: INTERNAL MEDICINE

## 2023-05-31 PROCEDURE — 25010000002 DIPHENHYDRAMINE PER 50 MG: Performed by: INTERNAL MEDICINE

## 2023-05-31 PROCEDURE — B2111ZZ FLUOROSCOPY OF MULTIPLE CORONARY ARTERIES USING LOW OSMOLAR CONTRAST: ICD-10-PCS | Performed by: INTERNAL MEDICINE

## 2023-05-31 PROCEDURE — 99153 MOD SED SAME PHYS/QHP EA: CPT | Performed by: INTERNAL MEDICINE

## 2023-05-31 PROCEDURE — 85730 THROMBOPLASTIN TIME PARTIAL: CPT | Performed by: FAMILY MEDICINE

## 2023-05-31 PROCEDURE — 82948 REAGENT STRIP/BLOOD GLUCOSE: CPT

## 2023-05-31 RX ORDER — NITROGLYCERIN 0.4 MG/1
0.4 TABLET SUBLINGUAL
Status: DISCONTINUED | OUTPATIENT
Start: 2023-05-31 | End: 2023-05-31 | Stop reason: HOSPADM

## 2023-05-31 RX ORDER — LIDOCAINE HYDROCHLORIDE 20 MG/ML
INJECTION, SOLUTION INFILTRATION; PERINEURAL
Status: DISCONTINUED | OUTPATIENT
Start: 2023-05-31 | End: 2023-05-31 | Stop reason: HOSPADM

## 2023-05-31 RX ORDER — PANTOPRAZOLE SODIUM 40 MG/1
40 TABLET, DELAYED RELEASE ORAL DAILY
Status: DISCONTINUED | OUTPATIENT
Start: 2023-05-31 | End: 2023-05-31 | Stop reason: HOSPADM

## 2023-05-31 RX ORDER — DIPHENHYDRAMINE HYDROCHLORIDE 50 MG/ML
INJECTION INTRAMUSCULAR; INTRAVENOUS
Status: DISCONTINUED | OUTPATIENT
Start: 2023-05-31 | End: 2023-05-31 | Stop reason: HOSPADM

## 2023-05-31 RX ORDER — GABAPENTIN 300 MG/1
300 CAPSULE ORAL 4 TIMES DAILY
Status: DISCONTINUED | OUTPATIENT
Start: 2023-05-31 | End: 2023-05-31 | Stop reason: HOSPADM

## 2023-05-31 RX ORDER — INSULIN LISPRO 100 [IU]/ML
1-10 INJECTION, SOLUTION INTRAVENOUS; SUBCUTANEOUS
Status: DISCONTINUED | OUTPATIENT
Start: 2023-05-31 | End: 2023-05-31

## 2023-05-31 RX ORDER — MIDAZOLAM HYDROCHLORIDE 2 MG/2ML
INJECTION, SOLUTION INTRAMUSCULAR; INTRAVENOUS
Status: DISCONTINUED | OUTPATIENT
Start: 2023-05-31 | End: 2023-05-31 | Stop reason: HOSPADM

## 2023-05-31 RX ORDER — ACETAMINOPHEN 325 MG/1
650 TABLET ORAL EVERY 4 HOURS PRN
Status: DISCONTINUED | OUTPATIENT
Start: 2023-05-31 | End: 2023-05-31 | Stop reason: HOSPADM

## 2023-05-31 RX ORDER — ACETAMINOPHEN 500 MG
TABLET ORAL EVERY 6 HOURS PRN
Qty: 60 TABLET | Refills: 3 | Status: SHIPPED | OUTPATIENT
Start: 2023-05-31

## 2023-05-31 RX ORDER — CETIRIZINE HYDROCHLORIDE 10 MG/1
10 TABLET ORAL DAILY
Status: DISCONTINUED | OUTPATIENT
Start: 2023-05-31 | End: 2023-05-31 | Stop reason: HOSPADM

## 2023-05-31 RX ORDER — LORAZEPAM 0.5 MG/1
0.5 TABLET ORAL DAILY PRN
Status: DISCONTINUED | OUTPATIENT
Start: 2023-05-31 | End: 2023-05-31 | Stop reason: HOSPADM

## 2023-05-31 RX ORDER — VERAPAMIL HYDROCHLORIDE 2.5 MG/ML
INJECTION, SOLUTION INTRAVENOUS
Status: DISCONTINUED | OUTPATIENT
Start: 2023-05-31 | End: 2023-05-31 | Stop reason: HOSPADM

## 2023-05-31 RX ORDER — ONDANSETRON 2 MG/ML
4 INJECTION INTRAMUSCULAR; INTRAVENOUS EVERY 6 HOURS PRN
Status: DISCONTINUED | OUTPATIENT
Start: 2023-05-31 | End: 2023-05-31 | Stop reason: HOSPADM

## 2023-05-31 RX ORDER — FENTANYL CITRATE 50 UG/ML
INJECTION, SOLUTION INTRAMUSCULAR; INTRAVENOUS
Status: DISCONTINUED | OUTPATIENT
Start: 2023-05-31 | End: 2023-05-31 | Stop reason: HOSPADM

## 2023-05-31 RX ORDER — HYDROMORPHONE HCL 110MG/55ML
PATIENT CONTROLLED ANALGESIA SYRINGE INTRAVENOUS
Status: DISCONTINUED | OUTPATIENT
Start: 2023-05-31 | End: 2023-05-31 | Stop reason: HOSPADM

## 2023-05-31 RX ORDER — ONDANSETRON 4 MG/1
4 TABLET, FILM COATED ORAL EVERY 6 HOURS PRN
Status: DISCONTINUED | OUTPATIENT
Start: 2023-05-31 | End: 2023-05-31 | Stop reason: HOSPADM

## 2023-05-31 RX ORDER — SODIUM CHLORIDE 0.9 % (FLUSH) 0.9 %
10 SYRINGE (ML) INJECTION AS NEEDED
Status: DISCONTINUED | OUTPATIENT
Start: 2023-05-31 | End: 2023-05-31 | Stop reason: HOSPADM

## 2023-05-31 RX ORDER — SODIUM CHLORIDE 9 MG/ML
40 INJECTION, SOLUTION INTRAVENOUS AS NEEDED
Status: DISCONTINUED | OUTPATIENT
Start: 2023-05-31 | End: 2023-05-31

## 2023-05-31 RX ORDER — SODIUM CHLORIDE 9 MG/ML
100 INJECTION, SOLUTION INTRAVENOUS CONTINUOUS
Status: DISCONTINUED | OUTPATIENT
Start: 2023-05-31 | End: 2023-05-31 | Stop reason: HOSPADM

## 2023-05-31 RX ORDER — CLOPIDOGREL BISULFATE 75 MG/1
75 TABLET ORAL DAILY
Status: DISCONTINUED | OUTPATIENT
Start: 2023-05-31 | End: 2023-05-31 | Stop reason: HOSPADM

## 2023-05-31 RX ORDER — ACETAMINOPHEN 500 MG
1000 TABLET ORAL EVERY 6 HOURS PRN
Status: DISCONTINUED | OUTPATIENT
Start: 2023-05-31 | End: 2023-05-31 | Stop reason: HOSPADM

## 2023-05-31 RX ORDER — LORAZEPAM 2 MG/ML
INJECTION INTRAMUSCULAR
Status: DISCONTINUED | OUTPATIENT
Start: 2023-05-31 | End: 2023-05-31 | Stop reason: HOSPADM

## 2023-05-31 RX ORDER — HEPARIN SODIUM 1000 [USP'U]/ML
INJECTION, SOLUTION INTRAVENOUS; SUBCUTANEOUS
Status: DISCONTINUED | OUTPATIENT
Start: 2023-05-31 | End: 2023-05-31 | Stop reason: HOSPADM

## 2023-05-31 RX ORDER — FUROSEMIDE 40 MG/1
40 TABLET ORAL DAILY
Status: DISCONTINUED | OUTPATIENT
Start: 2023-05-31 | End: 2023-05-31 | Stop reason: HOSPADM

## 2023-05-31 RX ORDER — LOPERAMIDE HYDROCHLORIDE 2 MG/1
2 CAPSULE ORAL 4 TIMES DAILY PRN
Status: DISCONTINUED | OUTPATIENT
Start: 2023-05-31 | End: 2023-05-31 | Stop reason: HOSPADM

## 2023-05-31 RX ORDER — GINSENG 100 MG
1 CAPSULE ORAL 2 TIMES DAILY
Status: DISCONTINUED | OUTPATIENT
Start: 2023-05-31 | End: 2023-05-31 | Stop reason: HOSPADM

## 2023-05-31 RX ORDER — SODIUM CHLORIDE 0.9 % (FLUSH) 0.9 %
10 SYRINGE (ML) INJECTION EVERY 12 HOURS SCHEDULED
Status: DISCONTINUED | OUTPATIENT
Start: 2023-05-31 | End: 2023-05-31 | Stop reason: HOSPADM

## 2023-05-31 RX ADMIN — HYDROMORPHONE HYDROCHLORIDE 0.5 MG: 1 INJECTION, SOLUTION INTRAMUSCULAR; INTRAVENOUS; SUBCUTANEOUS at 09:15

## 2023-05-31 RX ADMIN — HYDROMORPHONE HYDROCHLORIDE 0.5 MG: 1 INJECTION, SOLUTION INTRAMUSCULAR; INTRAVENOUS; SUBCUTANEOUS at 11:17

## 2023-05-31 RX ADMIN — Medication 10 ML: at 08:55

## 2023-05-31 RX ADMIN — Medication 10 ML: at 08:14

## 2023-05-31 RX ADMIN — GABAPENTIN 100 MG: 100 CAPSULE ORAL at 06:00

## 2023-05-31 RX ADMIN — HYDROMORPHONE HYDROCHLORIDE 0.5 MG: 1 INJECTION, SOLUTION INTRAMUSCULAR; INTRAVENOUS; SUBCUTANEOUS at 03:00

## 2023-05-31 RX ADMIN — SODIUM CHLORIDE 100 ML/HR: 9 INJECTION, SOLUTION INTRAVENOUS at 16:31

## 2023-05-31 RX ADMIN — LORAZEPAM 0.5 MG: 2 SOLUTION, CONCENTRATE ORAL at 12:12

## 2023-05-31 RX ADMIN — HYDROMORPHONE HYDROCHLORIDE 0.5 MG: 1 INJECTION, SOLUTION INTRAMUSCULAR; INTRAVENOUS; SUBCUTANEOUS at 06:27

## 2023-05-31 RX ADMIN — Medication 10 ML: at 08:17

## 2023-05-31 RX ADMIN — HYDROMORPHONE HYDROCHLORIDE 0.5 MG: 1 INJECTION, SOLUTION INTRAMUSCULAR; INTRAVENOUS; SUBCUTANEOUS at 00:55

## 2023-05-31 RX ADMIN — CEFTRIAXONE SODIUM 1 G: 1 INJECTION, SOLUTION INTRAVENOUS at 08:14

## 2023-05-31 NOTE — PROGRESS NOTES
Date of service 5/31/2023    Subjective: No SOB, chest pain, palpitations or lightheadedness.     Review of systems: Bilateral contracture of the lower extremities.  Cannot extend her legs straight.  No diarrhea anymore. No headache, vertigo, nausea, vomiting, abdominal pain fever, chills, TIA or CVA-like symptoms.    Examinations: She was in no pain or respiratory distress  Vital signs: Reviewed  HEENT: Sclera nonicteric.  Neck: No JVD or carotid bruit  Chest:  CTA.  Cardiac: RRR.  2/6 stock murmur over the left sternal border.  No S3 gallop.  Abdomen: Soft and nontender  Extremities: 1/4 bilateral lower leg and ankle edema.  Chronic dermatitic skin changes  Neuro: Alert, oriented, no facial droop and speech was clear     Records reviewed.     Assessment and plan:    1.  Acute non-STEMI.    2.  Status post TAVR.  3.  Septic shock.    4.  Acute kidney injury, improved  5.  Altered mental state, improved  6.  Hypokalemia, corrected  7.   Radial cardiac catheterization  today per Dr. Reid today.

## 2023-05-31 NOTE — PROGRESS NOTES
[facility]   Hospitalist Progress Note  Date: 2023  Patient Name: Isha Llanes  : 1965  MRN: [id number]  Date of admission: 2023      Subjective   Subjective     Chief Complaint: Follow-up altered mental status    Summary:Isha Llanes is a 57 y.o. female   history of aortic stenosis status post TAVR, Hodgkin's lymphoma, recently diagnosed throat cancer, lupus, chronic back pain post intrathecal pain pump implant brought into the emergency department for evaluation of altered mental status.    Patient found unconscious in her kitchen.  She was slumped over in her wheelchair upon arrival.  Patient is wheelchair-bound at baseline. EMS was called, was transported to emergency department for evaluation.  Daughter reports that the patient had defecated on herself at the time of being found.  Daughter reports that the patient has a left upper chest wall Mediport, and history of persistently positive blood cultures, requiring frequent IV antibiotic infusions.  She currently the process of getting the Mediport removed as it is inaccessible.  Last known normal was the patient was able for presentation.  In the emergency department patient found to be initially afebrile developed a temperature of 102, sinus tach 90s to 100 on telemetry review, hypotensive 80s over 50s, requiring 2 L nasal cannula keep sats greater than 90%.  ABG showed respiratory compensation of metabolic acidosis, troponin elevated greater than 300 initially.  EKG negative for acute ischemic changes.  Redemonstrated chronic left bundle branch block.  Creatinine elevated 2.07, lactate greater than 9, ALT AST mildly elevated Pro-Eg greater than 400.  Urinalysis negative for pyuria.  Tox screen positive for opioids.  Patient does have a Dilaudid pain pump.  CT head negative for any acute intracranial abnormalities.  CT abdomen pelvis demonstrated hepatic steatosis nonobstructive right renal stone and mild pelvic ascites.  CT  chest demonstrated patchy bilateral consolidations worse in the left upper lobe concerning for multifocal pneumonia.  Also noted to have a small right pleural effusion and moderate emphysema.  Patient bolused IV fluids started on empiric antibiotics after cultures were drawn.  Patient admitted to ICU for further evaluation and treatment of septic shock thought secondary to multifocal pneumonia with associated encephalopathy, acute kidney injury and NSTEMI.  Pulmonology critical care consulted.  Continued on empiric antibiotics.  Despite volume resuscitation blood pressure remained low required initiation of Levophed and vasopressin. Also started on midodrine.  Renal function returned to baseline with IV fluids.  Wound culture right calf positive for MSSA. Antibiotics deescalated and tailored to cover multifocal pneumonia and MSSA. Mental status improved. Respiratory status declined. Repeat imaging concerning for multifocal pneumonia with pulmonary edema.  Diuresed with IV Lasix. Slowly weaned off levophed and vasopressin and transferred out of the ICU.  Cardiology recommending cardiac cath once stable.  Patient wanting to discharge home ultimately.  During work-up hep C antibody positive.  Follow-up HCV RNA by PCR pending.    Interval Followup: No acute events overnight, patient resting comfortably in bed, diarrhea improved, lower extremity pain stable      Objective   Objective     Vitals:   Temp:  [97.2 °F (36.2 °C)-99 °F (37.2 °C)] 97.7 °F (36.5 °C)  Heart Rate:  [73-92] 82  Resp:  [18-26] 18  BP: (104-140)/(51-78) 121/78  Flow (L/min):  [2] 2  Physical Exam   GEN: No acute distress   HEENT: Moist mucous membranes  LUNGS: Equal chest rise bilaterally  CARDIAC: Regular rate and rhythm  NEURO: Global weakness  SKIN: Bilateral wounds on the calf    Result Review    Result Review:  I have personally reviewed these results and agree with these findings:  [x]  Laboratory  LAB RESULTS:      Lab 05/30/23  0636  05/30/23  0635 05/29/23  0604 05/28/23  1049 05/28/23  0829 05/28/23  0808 05/27/23  0613 05/27/23  0102 05/26/23  1828 05/26/23  1200 05/26/23  0708 05/26/23  0455 05/26/23  0142   WBC 5.44  --   --   --  16.33*  --  19.09*  --   --   --   --   --  10.29   HEMOGLOBIN 10.4*  --   --   --  10.9*  --  11.6*  --   --   --   --   --  15.1   HEMATOCRIT 31.6*  --   --   --  35.2  --  35.1  --   --   --   --   --  46.4   PLATELETS 59*  --   --   --  48*  --  50*  --   --   --   --   --  95*   NEUTROS ABS  --   --   --   --   --   --  18.14*  --   --   --   --   --  9.34*   IMMATURE GRANS (ABS)  --   --   --   --   --   --   --   --   --   --   --   --  0.11*   LYMPHS ABS  --   --   --   --   --   --   --   --   --   --   --   --  0.67*   MONOS ABS  --   --   --   --   --   --   --   --   --   --   --   --  0.10   EOS ABS  --   --   --   --   --   --   --   --   --   --   --   --  0.04   MCV 82.3  --   --   --  88.0  --  82.8  --   --   --   --   --  85.0   PROCALCITONIN  --   --   --   --   --  >100.00*  --   --   --   --   --   --  >400.00*   LACTATE  --   --   --   --   --   --   --  3.0* 3.7* 4.8* 3.1* 4.9* 9.6*   LACTATE, ARTERIAL  --   --   --   --   --   --   --   --   --   --   --   --  6.56*   PROTIME  --   --   --   --   --   --   --   --   --   --   --   --  17.1*   APTT  --  26.4* 31.0* 34.6*  --   --  41.3*  --   --   --  37.3*  --   --          Lab 05/30/23  0635 05/29/23  0604 05/28/23  0808 05/27/23  0613 05/26/23  0708 05/26/23  0142   SODIUM 140 139 138 138 136 137   SODIUM, ARTERIAL  --   --   --   --   --  134.6*   POTASSIUM 3.7 3.6 3.8 3.8 3.5 3.4*   CHLORIDE 105 105 106 107 103 96*   CO2 27.9 27.0 21.7* 22.7 17.9* 19.0*   ANION GAP 7.1 7.0 10.3 8.3 15.1* 22.0*   BUN 13 14 14 19 30* 25*   CREATININE 0.53* 0.57 0.49* 0.57 1.32* 2.07*   EGFR 108.0 106.1 110.1 106.1 47.2* 27.5*   GLUCOSE 154* 146* 163* 116* 168* 172*   GLUCOSE, ARTERIAL  --   --   --   --   --  181*   CALCIUM 8.0* 8.0* 8.0* 7.7* 8.0* 9.6    IONIZED CALCIUM  --   --   --   --   --  1.11*   MAGNESIUM 1.7 1.7 2.2 2.4 1.4*  --    PHOSPHORUS 3.0  --  1.7* 3.1  --   --    TSH  --   --   --   --  1.100  --          Lab 05/30/23  0635 05/29/23  0604 05/28/23  0808 05/27/23  0613 05/26/23  1200 05/26/23  0142   TOTAL PROTEIN 6.0 5.5* 5.6* 5.8*  --  7.2   ALBUMIN 3.0* 2.9* 2.5* 2.9*  --  3.8   GLOBULIN 3.0 2.6 3.1 2.9  --  3.4   ALT (SGPT) 48* 72* 102* 132*  --  99*   AST (SGOT) 26 44* 91* 177*  --  192*   BILIRUBIN 0.4 0.6 0.6 0.6  --  1.4*   ALK PHOS 104 102 93 89  --  129*   LIPASE  --   --   --   --  8*  --          Lab 05/29/23  1909 05/29/23  1658 05/27/23  0613 05/26/23  0338 05/26/23  0142   PROBNP  --   --  12,931.0*  --   --    HSTROP T 81* 77*  --  256* 303*   PROTIME  --   --   --   --  17.1*   INR  --   --   --   --  1.40*                 Lab 05/26/23  0142   PH, ARTERIAL 7.429   PCO2, ARTERIAL 29.8*   PO2 ART 78.0*   O2 SATURATION ART 95.3   FIO2 28   HCO3 ART 19.3*   BASE EXCESS ART -3.7*   CARBOXYHEMOGLOBIN 2.0*     Brief Urine Lab Results  (Last result in the past 365 days)      Color   Clarity   Blood   Leuk Est   Nitrite   Protein   CREAT   Urine HCG        05/28/23 0405 Yellow   Clear   Small (1+)   Negative   Negative   30 mg/dL (1+)               Microbiology Results (last 10 days)     Procedure Component Value - Date/Time    Respiratory Culture - Sputum, Cough [[id number]] Collected: 05/28/23 0648    Lab Status: Final result Specimen: Sputum from Cough Updated: 05/28/23 0820     Respiratory Culture Rejected     Gram Stain Rare (1+) Epithelial cells seen      Rare (1+) WBCs seen      No organisms seen    Narrative:      Specimen rejected due to oropharyngeal contamination. Please reorder and recollect specimen if clinically necessary.    Respiratory Panel PCR w/COVID-19(SARS-CoV-2) TANIKA/MARY/MINNIE/PAD/COR/MAD/ELLEN In-House, NP Swab in UTM/VTM, 3-4 HR TAT - Swab, Nasopharynx [[id number]]  (Normal) Collected: 05/27/23 3698    Lab Status: Final  result Specimen: Swab from Nasopharynx Updated: 05/27/23 1104     ADENOVIRUS, PCR Not Detected     Coronavirus 229E Not Detected     Coronavirus HKU1 Not Detected     Coronavirus NL63 Not Detected     Coronavirus OC43 Not Detected     COVID19 Not Detected     Human Metapneumovirus Not Detected     Human Rhinovirus/Enterovirus Not Detected     Influenza A PCR Not Detected     Influenza B PCR Not Detected     Parainfluenza Virus 1 Not Detected     Parainfluenza Virus 2 Not Detected     Parainfluenza Virus 3 Not Detected     Parainfluenza Virus 4 Not Detected     RSV, PCR Not Detected     Bordetella pertussis pcr Not Detected     Bordetella parapertussis PCR Not Detected     Chlamydophila pneumoniae PCR Not Detected     Mycoplasma pneumo by PCR Not Detected    Narrative:      In the setting of a positive respiratory panel with a viral infection PLUS a negative procalcitonin without other underlying concern for bacterial infection, consider observing off antibiotics or discontinuation of antibiotics and continue supportive care. If the respiratory panel is positive for atypical bacterial infection (Bordetella pertussis, Chlamydophila pneumoniae, or Mycoplasma pneumoniae), consider antibiotic de-escalation to target atypical bacterial infection.    MRSA Screen, PCR (Inpatient) - Swab, Nares [[id number]]  (Normal) Collected: 05/26/23 1640    Lab Status: Final result Specimen: Swab from Nares Updated: 05/26/23 1808     MRSA PCR No MRSA Detected    Narrative:      The negative predictive value of this diagnostic test is high and should only be used to consider de-escalating anti-MRSA therapy. A positive result may indicate colonization with MRSA and must be correlated clinically.    Wound Culture - Swab, Leg, Right [[id number]]  (Abnormal)  (Susceptibility) Collected: 05/26/23 0305    Lab Status: Final result Specimen: Swab from Leg, Right Updated: 05/28/23 0848     Wound Culture Moderate growth (3+) Staphylococcus aureus      Gram Stain Many (4+) WBCs seen      Few (2+) Gram positive cocci in pairs    Susceptibility      Staphylococcus aureus      MARTHA      Clindamycin Susceptible      Erythromycin Susceptible      Inducible Clindamycin Resistance Negative      Oxacillin Susceptible      Rifampin Susceptible      Tetracycline Susceptible      Trimethoprim + Sulfamethoxazole Susceptible      Vancomycin Susceptible                       Susceptibility Comments     Staphylococcus aureus    This isolate does not demonstrate inducible clindamycin resistance in vitro.               S. Pneumo Ag Urine or CSF - Urine, Straight Cath [[id number]]  (Normal) Collected: 05/26/23 0225    Lab Status: Final result Specimen: Urine from Straight Cath Updated: 05/26/23 1208     Strep Pneumo Ag Negative    Legionella Antigen, Urine - Urine, Straight Cath [[id number]]  (Normal) Collected: 05/26/23 0225    Lab Status: Final result Specimen: Urine from Straight Cath Updated: 05/26/23 1208     LEGIONELLA ANTIGEN, URINE Negative    Blood Culture - Blood, Arm, Left [[id number]]  (Normal) Collected: 05/26/23 0142    Lab Status: Final result Specimen: Blood from Arm, Left Updated: 05/31/23 0200     Blood Culture No growth at 5 days    Blood Culture - Blood, Arm, Left [[id number]]  (Normal) Collected: 05/26/23 0142    Lab Status: Final result Specimen: Blood from Arm, Left Updated: 05/31/23 0200     Blood Culture No growth at 5 days          [x]  Microbiology  [x]  Radiology  CT Abdomen Pelvis Without Contrast    Result Date: 5/26/2023    1. Hepatic steatosis. 2. Nonobstructing right renal stone. 3. Mild pelvic ascites.     MORALES TESFAYE MD       Electronically Signed and Approved By: MORALES TESFAYE MD on 5/26/2023 at 17:55             CT Head Without Contrast    Result Date: 5/26/2023   No acute brain abnormality is seen. Specifically, no acute intracranial hemorrhage, no acute infarct, no significant intracranial mass lesion, and no acute intracranial mass effect  are appreciated.   The study is motion-limited.    Please note that portions of this note were completed with a voice recognition program.  JEN JONES JR, MD       Electronically Signed and Approved By: JEN JONES JR, MD on 5/26/2023 at 2:22              CT Chest Without Contrast Diagnostic    Result Date: 5/26/2023    1. Patchy bilateral consolidations worse in the left upper lobe, suggesting multifocal pneumonia. 2. Small right pleural effusion with bibasilar atelectasis. 3. Moderate emphysema.     MORALES TESFAYE MD       Electronically Signed and Approved By: MORALES TESFAYE MD on 5/26/2023 at 17:50             XR Chest 1 View    Result Date: 5/28/2023    Multifocal patchy airspace opacities present bilaterally, likely related to pneumonia.  This is likely superimposed on a mild pulmonary edema pattern.  No significant pleural effusion.       MELANIE CHOE MD       Electronically Signed and Approved By: MELANIE CHOE MD on 5/28/2023 at 9:24             XR Chest 1 View    Result Date: 5/26/2023    No acute infiltrate is appreciated.    Please note that portions of this note were completed with a voice recognition program.  JEN JONES JR, MD       Electronically Signed and Approved By: JEN JONES JR, MD on 5/26/2023 at 2:27                [x]  EKG/Telemetry   [x]  Cardiology/Vascular   Echocardiogram 5/26/2023    •  Left ventricular ejection fraction appears to be 46 - 50%.  •  Left ventricular wall thickness is consistent with mild concentric hypertrophy.  •  Left ventricular diastolic function was indeterminate.  •  There is a TAVR valve present.  •  Moderate to severe tricuspid valve regurgitation is present.  •  Estimated right ventricular systolic pressure from tricuspid regurgitation is mildly elevated (35-45 mmHg).    []  Pathology  []  Old records  [x]  Other:  Scheduled Meds:atorvastatin, 80 mg, Oral, Nightly  cefTRIAXone, 1 g, Intravenous, Q24H  clopidogrel, 75 mg, Oral,  Daily  enoxaparin, 40 mg, Subcutaneous, Q24H  furosemide, 40 mg, Intravenous, BID  gabapentin, 100 mg, Oral, Q8H  insulin detemir, 10 Units, Subcutaneous, Nightly  insulin regular, 2-7 Units, Subcutaneous, Q6H  midodrine, 10 mg, Oral, TID AC  nicotine, 1 patch, Transdermal, Q24H  saccharomyces boulardii, 250 mg, Oral, BID  senna-docusate sodium, 2 tablet, Oral, BID  sodium chloride, 10 mL, Intravenous, Q12H  sodium chloride, 10 mL, Intravenous, Q12H      Continuous Infusions:pain,   Pharmacy to Dose enoxaparin (LOVENOX),   sodium chloride, 75 mL/hr      PRN Meds:.•  acetaminophen  •  albuterol  •  senna-docusate sodium **AND** polyethylene glycol **AND** [DISCONTINUED] bisacodyl **AND** bisacodyl  •  dextrose  •  dextrose  •  First Mouthwash (Magic Mouthwash)  •  glucagon (human recombinant)  •  haloperidol lactate  •  HYDROmorphone  •  loperamide  •  LORazepam  •  melatonin  •  nitroglycerin  •  ondansetron  •  Pharmacy to Dose enoxaparin (LOVENOX)  •  phenol  •  sodium chloride  •  sodium chloride  •  sodium chloride  •  sodium chloride      Assessment & Plan   Assessment / Plan     Assessment/Plan:  Septic shock  Lactic acidosis  Acute metabolic encephalopathy secondary to septic shock  Multifocal pneumonia  MSSA infection of right venous stasis ulcer  Acute on chronic hypoxic respiratory failure  NSTEMI secondary to septic shock  Acute kidney injury secondary to septic shock  Acute systolic congestive heart failure  Transaminitis  Lactic acidosis  Hepatic cirrhosis  Hep C antibody positive  Insulin-dependent diabetes mellitus  Diabetic neuropathy  Aortic stenosis status post TAVR  Hodgkin's lymphoma  Recently diagnosed throat cancer  Chronic back pain status post pain pump implant  Hypokalemia  Hypomagnesemia  Thrombocytopenia concern secondary to cirrhosis  Wheelchair-bound    Patient admitted for further evaluation and treatment  Pulmonology consulted, has signed off at this time  Continue Rocephin for wound  infection  Continue midodrine for low blood pressure  Continue supplemental nasal cannula oxygen titrated keep sats greater than 90%  Continue Plavix and atorvastatin, tolerating   Cardiology recommending cath prior to DC  Continue furosemide 40 mg IV twice daily, monitor electrolytes and renal function closely  Hep C RNA PCR pending  Advance diet as tolerated by speech  Continue basal bolus insulin  Patient will need to follow-up with oncology as an outpatient once stable  Patient's pain pump controller currently not available  Continue IV Dilaudid as needed to avoid withdrawal symptoms  Continue monitor thrombocytopenia suspect secondary to splenic sequestration with sepsis  Chest x-ray personally reviewed patchy airspace opacities likely secondary to multifocal pneumonia  Patient scheduled for cardiac catheterization today  CBC, CMP ordered and pending  Repeat CBC, CMP, mag and Phos in a.m.    Reviewed patients labs and imaging, and discussed with patient and nurse at bedside.    DVT prophylaxis:  Medical DVT prophylaxis orders are present.    CODE STATUS:   Level Of Support Discussed With: Next of Kin (If No Surrogate)  Code Status (Patient has no pulse and is not breathing): CPR (Attempt to Resuscitate)  Medical Interventions (Patient has pulse or is breathing): Full Support  Comments: daughter at bedside confirmed code status      Electronically signed by Bull Davila MD, 05/31/23, 10:32 AM EDT.

## 2023-05-31 NOTE — PLAN OF CARE
Goal Outcome Evaluation:      Patient has been complaining of pain, medicated per mar. Patient has been resting between care. No other complaints at this time. VSS. Call light within reach

## 2023-05-31 NOTE — SIGNIFICANT NOTE
Port does not have blood return at this time.  Pt is scheduled for cardiac cath today, will follow up regarding cathflo when pt is back on floor.

## 2023-06-01 ENCOUNTER — TELEPHONE (OUTPATIENT)
Dept: FAMILY MEDICINE CLINIC | Facility: CLINIC | Age: 58
End: 2023-06-01

## 2023-06-01 ENCOUNTER — TRANSITIONAL CARE MANAGEMENT TELEPHONE ENCOUNTER (OUTPATIENT)
Dept: CALL CENTER | Facility: HOSPITAL | Age: 58
End: 2023-06-01

## 2023-06-01 LAB — 1,3 BETA GLUCAN SER-MCNC: 65 PG/ML

## 2023-06-01 RX ORDER — ONDANSETRON 4 MG/1
4 TABLET, ORALLY DISINTEGRATING ORAL EVERY 8 HOURS PRN
Qty: 15 TABLET | Refills: 0 | Status: SHIPPED | OUTPATIENT
Start: 2023-06-01

## 2023-06-01 NOTE — TELEPHONE ENCOUNTER
Called patient and let her know provider is aware. She stated that she needed an appt to see him soon as possible. Got patient an appt.

## 2023-06-01 NOTE — SIGNIFICANT NOTE
"Called to check on Mrs. Llanes at 2:19pm on 6/1. Spoke with the patients family she stated \"the patient was doing okay and was not at home at this time\"   "

## 2023-06-01 NOTE — OUTREACH NOTE
Prep Survey    Flowsheet Row Responses   [facility] patient discharged from? Go   Is LACE score < 7 ? No   Eligibility [facility] Go   Date of Admission 05/26/23   Date of Discharge 05/31/23   Discharge Disposition Home or Self Care   Discharge diagnosis Septic shock   Does the patient have one of the following disease processes/diagnoses(primary or secondary)? Sepsis   Does the patient have Home health ordered? Yes   What is the Home health agency?  [facility]   Is there a DME ordered? Yes   What DME was ordered? [employer] - wheelchair   Prep survey completed? Yes          Morelia GOODMAN - Registered Nurse

## 2023-06-01 NOTE — TELEPHONE ENCOUNTER
Caller: Isha Llanes    Relationship: Self    Best call back number: [phone]    What medication are you requesting: ZOFRAN    What are your current symptoms: NAUSEA, UPSET STOMACH    If a prescription is needed, what is your preferred pharmacy and phone number: [facility] - [place], [place] - [address] [facility] - [phone]  - [phone] FX     Additional notes:  THE PATIENT WOULD LIKE A NEW PRESCRIPTION FOR ZOFRAN CALLED TO THE PHARMACY ASAP

## 2023-06-01 NOTE — OUTREACH NOTE
"Call Center TCM Note    Flowsheet Row Responses   [facility] patient discharged from? Go   Does the patient have one of the following disease processes/diagnoses(primary or secondary)? Sepsis   TCM attempt successful? Yes  [verbal release for Sheila Watson dtr]   Call start time 1130   Call end time 1133   General alerts for this patient Pt is followed by ACM/CCM   Discharge diagnosis Septic shock   Prescription comments unable to review meds---no new meds added, no AVS as pt left AMA   Medication comments Pt requests her PCP to call in a \"wash\" for her legs that she has used in the past for open sores, will forward to office   Comments PCP FOLLOW UP APPOINTMENT IS 6/7/23@1130am   Does the patient have an appointment with their PCP within 7 days of discharge? Yes   What is the Home health agency?  [facility]   Has home health visited the patient within 72 hours of discharge? Unsure   What is the patient's perception of their health status since discharge? --  [Unable to fully complete call as phone was running out of charge--pt reports she was treated for an MI, PNA and had a panic attack due to bed placement and other issues.  Pt also had a cardiac cath while hospitalized.]   Additional teach back comments This RN able to confirm appt prior to pt ending call due to her phone issues.   TCM call completed? Yes   Call end time 1133          Dionne Mccann RN    6/1/2023, 11:41 EDT      "

## 2023-06-01 NOTE — TELEPHONE ENCOUNTER
Caller: Isha Llanes    Relationship: Self    Best call back number: [phone]    What is the best time to reach you: ANYTIME    Who are you requesting to speak with (clinical staff, provider,  specific staff member): CLINICAL      What was the call regarding: THE PATIENT WOULD LIKE PCP TO GET ALL RESULTS AND HEART CATHETER RESULTS. SHE STATED SHE HAD A HEART ATTACK AND WENT TO [facility]. SHE WOULD LIKE A CALL BACK TO CONFIRM.

## 2023-06-02 ENCOUNTER — TELEPHONE (OUTPATIENT)
Dept: FAMILY MEDICINE CLINIC | Facility: CLINIC | Age: 58
End: 2023-06-02

## 2023-06-02 NOTE — TELEPHONE ENCOUNTER
Caller: [facility]    Relationship: Home Health    Best call back number: [phone]      What was the call regarding: LEESA WITH [facility] STATES THAT THIS PATIENT WAS JUST RELEASED FROM THE HOSPITAL AFTER HAVING A HEART ATTACK. LEESA STATES THAT [facility] WITH BE SEEING THIS PATIENT 1 TIME A WEEK AS WELL AS DOING WOUND CARE ON HER. SHE STATES THAT PATIENT IS GETTING A WOUND ON HER RIGHT TOE AND RECOMMENDS IT BE LOOKED AT AT NEXT APPOINTMENT.

## 2023-06-03 LAB
QT INTERVAL: 402 MS
QT INTERVAL: 415 MS

## 2023-06-03 NOTE — DISCHARGE SUMMARY
[facility]         HOSPITALIST  DISCHARGE SUMMARY    Patient Name: Isha Llanes  : 1965  MRN: [id number]    Date of Admission: 2023  Date of Discharge:  6/3/2023  Primary Care Physician: Vidal Luevano APRN    Consults       Date and Time Order Name Status Description    2023  7:09 AM Inpatient Pulmonology Consult Completed           Problems:  Septic shock  Lactic acidosis  Acute metabolic encephalopathy secondary to septic shock  Multifocal pneumonia  MSSA infection of right venous stasis ulcer  Acute on chronic hypoxic respiratory failure  NSTEMI secondary to septic shock  Acute kidney injury secondary to septic shock  Acute systolic congestive heart failure  Transaminitis  Lactic acidosis  Hepatic cirrhosis  Hep C antibody positive  Insulin-dependent diabetes mellitus  Diabetic neuropathy  Aortic stenosis status post TAVR  Hodgkin's lymphoma  Recently diagnosed throat cancer  Chronic back pain status post pain pump implant  Hypokalemia  Hypomagnesemia  Thrombocytopenia concern secondary to cirrhosis  Wheelchair-bound    Hospital Course     Hospital Course:  Isha Llanes is a 57 y.o. female   history of aortic stenosis status post TAVR, Hodgkin's lymphoma, recently diagnosed throat cancer, lupus, chronic back pain post intrathecal pain pump implant brought into the emergency department for evaluation of altered mental status.    Patient found unconscious in her kitchen.  She was slumped over in her wheelchair upon arrival.  Patient is wheelchair-bound at baseline. EMS was called, was transported to emergency department for evaluation.  Daughter reports that the patient had defecated on herself at the time of being found.  Daughter reports that the patient has a left upper chest wall Mediport, and history of persistently positive blood cultures, requiring frequent IV antibiotic infusions.  She currently the process of getting the Mediport removed as it is  inaccessible.  Last known normal was the patient was able for presentation.  In the emergency department patient found to be initially afebrile developed a temperature of 102, sinus tach 90s to 100 on telemetry review, hypotensive 80s over 50s, requiring 2 L nasal cannula keep sats greater than 90%.  ABG showed respiratory compensation of metabolic acidosis, troponin elevated greater than 300 initially.  EKG negative for acute ischemic changes.  Redemonstrated chronic left bundle branch block.  Creatinine elevated 2.07, lactate greater than 9, ALT AST mildly elevated Pro-Ge greater than 400.  Urinalysis negative for pyuria.  Tox screen positive for opioids.  Patient does have a Dilaudid pain pump.  CT head negative for any acute intracranial abnormalities.  CT abdomen pelvis demonstrated hepatic steatosis nonobstructive right renal stone and mild pelvic ascites.  CT chest demonstrated patchy bilateral consolidations worse in the left upper lobe concerning for multifocal pneumonia.  Also noted to have a small right pleural effusion and moderate emphysema.  Patient bolused IV fluids started on empiric antibiotics after cultures were drawn.  Patient admitted to ICU for further evaluation and treatment of septic shock thought secondary to multifocal pneumonia with associated encephalopathy, acute kidney injury and NSTEMI.  Pulmonology critical care consulted.  Continued on empiric antibiotics.  Despite volume resuscitation blood pressure remained low required initiation of Levophed and vasopressin. Also started on midodrine.  Renal function returned to baseline with IV fluids.  Wound culture right calf positive for MSSA. Antibiotics deescalated and tailored to cover multifocal pneumonia and MSSA. Mental status improved. Respiratory status declined. Repeat imaging concerning for multifocal pneumonia with pulmonary edema.  Diuresed with IV Lasix. Slowly weaned off levophed and vasopressin and transferred out of the ICU.   Cardiology recommending cardiac cath once stable.  Patient wanting to discharge home ultimately.  During work-up hep C antibody positive.  Follow-up HCV RNA by PCR pending.     Unfortunately patient left AGAINST MEDICAL ADVICE prior to completion of her work-up and treatment.    Day of Discharge     Discharge Details        Discharge Medications        ASK your doctor about these medications        Instructions Start Date   Acetaminophen Extra Strength 500 MG tablet  Generic drug: acetaminophen   1,000-1,500 mg, Oral, Every 6 Hours PRN      albuterol sulfate  (90 Base) MCG/ACT inhaler  Commonly known as: PROVENTIL HFA;VENTOLIN HFA;PROAIR HFA   2 puffs, Inhalation, Every 4 Hours PRN      bacitracin 500 UNIT/GM ointment   1 application, Topical, 2 Times Daily, to affected area      benzocaine-menthol 15-3.6 MG lozenge lozenge  Commonly known as: CEPACOL   1 lozenge, Mouth/Throat, Every 2 Hours PRN      cetirizine 10 MG tablet  Commonly known as: zyrTEC   1 tablet, Oral, Daily      clopidogrel 75 MG tablet  Commonly known as: PLAVIX   1 tablet, Oral, Daily      furosemide 40 MG tablet  Commonly known as: LASIX   1 tablet, Oral, Daily      gabapentin 300 MG capsule  Commonly known as: NEURONTIN   300 mg, Oral, 4 Times Daily      Insulin Lispro (1 Unit Dial) 100 UNIT/ML solution pen-injector  Commonly known as: HUMALOG   1-10 Units, Subcutaneous, 3 Times Daily With Meals      Levemir 100 UNIT/ML injection  Generic drug: insulin detemir   10 Units, Subcutaneous, Daily      loperamide 2 MG capsule  Commonly known as: IMODIUM   2 mg, Oral, 4 Times Daily PRN      LORazepam 0.5 MG tablet  Commonly known as: ATIVAN   0.5 mg, Oral, Daily PRN      pain  patient supplied pump   Intrathecal, Continuous,  Type of Medication: Hydromorphone  and Bupivacaine Provider:Dr. Boudreaux Provider number: [phone] Basal Dose: Hydromorphone 7.518 mg/day Bupivacaine 0.43222 mg/day ( MAX of Hydromorphone 9.940 mg/ day; Bupivacaine 0.54085  mg /day) Bolus Dose:Hydromorphone 0.500 mg, Bupivacaine 0.14639 mg Lockout 3 hours. 1 Bolus per every 3 hours max of 5 bolus per day Next refill due: July 13 2023 Pump manufacture:[employer]          pantoprazole 40 MG EC tablet  Commonly known as: PROTONIX   40 mg, Oral, Daily               Allergies   Allergen Reactions    Amoxicillin Shortness Of Breath    Ceclor [Cefaclor] Shortness Of Breath    Penicillins Shortness Of Breath    Sulfa Antibiotics Rash    Valium [Diazepam] Mental Status Change     DEPRESSED    Ambien [Zolpidem] Mental Status Change    Aspirin GI Intolerance    Ativan [Lorazepam] Mental Status Change    Benadryl [Diphenhydramine] Anxiety     AND MAKES HER HYPER    Biaxin [Clarithromycin] Unknown - Low Severity    Cefazolin Unknown - Low Severity    Cephalexin Unknown - Low Severity    Cephalosporins Unknown - Low Severity    Clindamycin Unknown - Low Severity    Compazine [Prochlorperazine Edisylate] Rash    Contrast Dye (Echo Or Unknown Ct/Mr) Other (See Comments)     Caused pain in her arm    Doxycycline Rash    Eggs Or Egg-Derived Products GI Intolerance    Nsaids GI Intolerance    Phenergan [Promethazine Hcl] GI Intolerance    Promethazine GI Intolerance    Vancomycin Itching       Discharge Disposition:  Left Against Medical Advice    Diet:  Hospital:No active diet order      Discharge Activity:       CODE STATUS:  Code Status and Medical Interventions:   Ordered at: 05/26/23 0521     Level Of Support Discussed With:    Next of Kin (If No Surrogate)     Code Status (Patient has no pulse and is not breathing):    CPR (Attempt to Resuscitate)     Medical Interventions (Patient has pulse or is breathing):    Full Support     Comments:    daughter at bedside confirmed code status       Future Appointments   Date Time Provider Department [place]   6/7/2023 11:30 AM Vidal Luevano APRN [facility] [place] [facility]   6/19/2023  1:20 PM Rodger Velasco APRN [facility] [place] [facility]   6/29/2023  1:00 PM Sobeida Espinosa  M, APRN [facility] [place] [facility] [place]           Pertinent  and/or Most Recent Results     IMAGING:  Adult Transthoracic Echo Complete w/ Color, Spectral and Contrast if necessary per protocol    Result Date: 5/26/2023    Left ventricular ejection fraction appears to be 46 - 50%.   Left ventricular wall thickness is consistent with mild concentric hypertrophy.   Left ventricular diastolic function was indeterminate.   There is a TAVR valve present.   Moderate to severe tricuspid valve regurgitation is present.   Estimated right ventricular systolic pressure from tricuspid regurgitation is mildly elevated (35-45 mmHg). There were no apparent intracardiac masses, vegetations or thrombi.     CT Abdomen Pelvis Without Contrast    Result Date: 5/26/2023  PROCEDURE: CT ABDOMEN PELVIS WO CONTRAST  COMPARISON: [facility], PET, NM PET/CT SKULL BASE TO MID THIGH, 5/04/2023, 10:24.  [facility], CT, CT ABDOMEN PELVIS WO CONTRAST, 11/27/2022, 3:28.  INDICATIONS: Sepsis shock  TECHNIQUE: CT images were created without intravenous contrast.   PROTOCOL:   Standard imaging protocol performed    RADIATION:   DLP: 438.3  mGy*cm   Automated exposure control was utilized to minimize radiation dose.  FINDINGS:  There is hepatic steatosis.  The gallbladder is surgically absent.  The unenhanced adrenal glands, left kidney, spleen, and pancreas are unremarkable.  There is a tiny nonobstructing right renal stone.  The stomach is mildly distended.  A Dobbhoff tube has its tip in the proximal duodenum.  The small bowel appears normal in caliber and configuration.  The colon appears normal.  The appendix appears normal.  There is mild pelvic ascites.  There is no loculated collection.  No abnormally enlarged lymph nodes are identified.  A rectal tube is in place.  The uterus is surgically absent.  Air within the urinary bladder is likely secondary to Keating catheterization.  A spinal stimulator device is noted.  No aggressive  osseous lesions are identified.  There are severe degenerative changes along the right hip joint.        1. Hepatic steatosis. 2. Nonobstructing right renal stone. 3. Mild pelvic ascites.     MORALES TESFAYE MD       Electronically Signed and Approved By: MORALES TESFAYE MD on 5/26/2023 at 17:55             CT Head Without Contrast    Result Date: 5/26/2023  PROCEDURE: CT HEAD WO CONTRAST  COMPARISONS: 5/4/2023; 1/19/2018.  INDICATIONS: 57-year-old female w/ h/o mental status change, unknown cause; the patient was found on the floor; h/o lymphoma, not otherwise specified.  PROTOCOL:   Standard CT imaging protocol performed.    RADIATION:   Total DLP: 2,224.2 mGy*cm.   MA and/or KV were/was adjusted to minimize radiation dose.    TECHNIQUE: After obtaining the patient's consent, 282 CT images were obtained without non-ionic intravenous contrast material.  There is slight motion artifact on the study.  The best possible images were obtained.  DISCUSSION:   A routine nonenhanced head CT was performed.  No acute brain abnormality is identified.  No acute intracranial hemorrhage.  No acute infarct is seen.  No acute skull fracture.  No midline shift or acute intracranial mass effect is seen.  The ventricular size and configuration are within normal limits.  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 Mild age-indeterminate mucosal thickening involves the imaged paranasal sinuses.  No air-fluid interfaces are seen within the imaged paranasal sinuses.  There is a prominent torus palatinus which is partially imaged.       No acute brain abnormality is seen. Specifically, no acute intracranial hemorrhage, no acute infarct, no significant intracranial mass lesion, and no acute intracranial mass effect are appreciated.   The study is motion-limited.    Please note that portions of this note were completed with a voice recognition program.  JEN JONES JR, MD        Electronically Signed and Approved By: JEN JONES JR, MD on 2023 at 2:22              CT Chest Without Contrast Diagnostic    Result Date: 2023  PROCEDURE: CT CHEST WO CONTRAST DIAGNOSTIC  COMPARISON: [facility], CT, CT ABDOMEN PELVIS WO CONTRAST, 2022, 3:28.  [facility], PET, NM PET/CT SKULL BASE TO MID THIGH, 2023, 10:24.  INDICATIONS: Sepsis/shock  TECHNIQUE: CT images were created without the administration of contrast material.   PROTOCOL:   Standard imaging protocol performed    RADIATION:   DLP: 205.2 mGy*cm   Automated exposure control was utilized to minimize radiation dose.  FINDINGS:  The central tracheobronchial tree is clear.  There is moderate emphysema.  A couple benign calcified granulomas are noted.  There are patchy bilateral consolidations worse in the left upper lobe.  There is a small right pleural effusion with bibasilar atelectasis.  A left internal jugular port has its tip at the cavoatrial junction.  The heart size appears normal, with evidence of mild calcified coronary artery disease.  The great vessels are normal in caliber.  No abnormally enlarged lymph nodes are identified.  No aggressive osseous lesions are identified.        1. Patchy bilateral consolidations worse in the left upper lobe, suggesting multifocal pneumonia. 2. Small right pleural effusion with bibasilar atelectasis. 3. Moderate emphysema.     MORALES TESFAYE MD       Electronically Signed and Approved By: MORALES TESFAYE MD on 2023 at 17:50             Cardiac Catheterization/Vascular Study    Result Date: 2023  [facility] CARDIAC CATHETERIZATION PROCEDURE REPORT Patient: Isha Llanes : 1965 MRN: [id number] Procedure Date: 23 Referring Physician: Dr. Domenic Hurley Interventional Cardiologist: Poncho Reid MD Indication: Chest pain Elevated troponin Clinical Presentation: Patient is a 57-year-old female who presented with septic  shock and had elevated troponins. Procedure performed: Left radial arterial sheath placement 2.   Selective coronary angiography 5.   Moderate conscious sedation 6.   Hemostasis achieved by TR band placement Procedure Details: Informed consent was obtained with an explanation of the risks, benefits and alternatives of the procedure. The patient was brought to the Cardiac Catheterization Laboratory in a fasting state.   Pateint was prepped and draped in a standard, sterile fashion. Moderate sedation with Fentanyl and Versed was administered by the circulating nurse. Lidocaine 2% was used to anesthetize the left radial artery and a 5/6 Slender glidesheath was placed.  A 5 F TGR catheter was then advanced over a 0.035 guidewire into the ascending aorta.  This catheter was used to engage the right and left coronary arteries with diagnostic angiography obtained in all appropriate projections by injection of non-ionic contrast.   The catheter was then removed over a guidewire. The radial artery sheath was removed without difficulty and TR Band was placed over the access site with excellent hemostasis. Patient tolerated the procedure well without any complications, and transferred to the post procedure area for recovery in a stable condition. Findings: 1. Coronary Artery Anatomy: Dominance: Right Left Main: Very short vessel giving off LAD and circumflex.  No angiographic disease. Left Anterior Descending: Moderate size vessel with minimal luminal irregularities. Circumflex Artery: Moderate size vessel with minimal luminal irregularities. Right Coronary Artery: Moderate size vessel dominant for the posterior circulation.  Minimal luminal irregularities. Cumulative fluoroscopy time: 6.1-minute Cumulative air kerma: 329 Total amount of contrast used: See report Complications: None. Estimated Blood Loss: Minimal. Conclusions: Near angiographically normal coronary arteries. Did not pass through the bioprosthetic aortic valve.  Recommendations: Goal directed therapy and risk factor reduction. Electronically signed by Poncho Reid MD, 05/31/23, 3:42 PM EDT.      XR Chest 1 View    Result Date: 5/28/2023  PROCEDURE: XR CHEST 1 VW  COMPARISON: [facility], CR, XR CHEST 1 VW, 5/26/2023, 2:11.  INDICATIONS: SHORTNESS OF BREATH  FINDINGS:  There is a left internal jugular MediPort with the tip in the distal SVC.  Enteric tube present within the stomach.  The heart appears normal in size.  Prosthetic valve is present.  Patchy airspace disease is present within the bilateral upper and lower lobes.  No significant pleural effusion identified.  There is indistinctness of the pulmonary vasculature.  The osseous structures appear intact.        Multifocal patchy airspace opacities present bilaterally, likely related to pneumonia.  This is likely superimposed on a mild pulmonary edema pattern.  No significant pleural effusion.       MELANIE CHOE MD       Electronically Signed and Approved By: MELANIE CHOE MD on 5/28/2023 at 9:24             XR Chest 1 View    Result Date: 5/26/2023  PROCEDURE: XR CHEST 1 VW  COMPARISONS: 5/4/2023; 11/17/2022.  INDICATIONS: Possible sepsis; h/o lymphoma.  FINDINGS:  A single AP (or PA) upright portable chest radiograph was performed.  No cardiac enlargement is seen.  No acute infiltrate is appreciated.  No pleural effusion or pneumothorax is identified.  There is a left IJ central venous line in place with its distal tip in the expected lower superior vena cava (SVC).  The patient has undergone transcatheter aortic valve replacement (TAVR), seen previously.  The thoracic aorta is atherosclerotic.  There is emphysematous contour of the lungs.  Chronic calcified granulomatous disease involves the chest also.  There is pulmonary hypoinflation.  External artifacts obscure detail.  No significant interval change is seen since the prior study (or studies).        No acute infiltrate is appreciated.     Please note that portions of this note were completed with a voice recognition program.  JEN JONES JR, MD       Electronically Signed and Approved By: JEN JONES JR, MD on 5/26/2023 at 2:27                LAB RESULTS:      Lab 05/31/23  1117 05/30/23  0636 05/30/23  0635 05/29/23  0604 05/28/23  1049 05/28/23  0829 05/28/23  0808   WBC 4.51 5.44  --   --   --  16.33*  --    HEMOGLOBIN 12.1 10.4*  --   --   --  10.9*  --    HEMATOCRIT 38.0 31.6*  --   --   --  35.2  --    PLATELETS 91* 59*  --   --   --  48*  --    NEUTROS ABS 2.95  --   --   --   --   --   --    IMMATURE GRANS (ABS) 0.17*  --   --   --   --   --   --    LYMPHS ABS 0.83  --   --   --   --   --   --    MONOS ABS 0.49  --   --   --   --   --   --    EOS ABS 0.06  --   --   --   --   --   --    MCV 83.7 82.3  --   --   --  88.0  --    PROCALCITONIN  --   --   --   --   --   --  >100.00*   APTT 27.7*  --  26.4* 31.0* 34.6*  --   --          Lab 05/31/23 1117 05/30/23 0635 05/29/23  06 05/28/23  0808   SODIUM 136 140 139 138   POTASSIUM 4.3 3.7 3.6 3.8   CHLORIDE 102 105 105 106   CO2 24.6 27.9 27.0 21.7*   ANION GAP 9.4 7.1 7.0 10.3   BUN 10 13 14 14   CREATININE 0.43* 0.53* 0.57 0.49*   EGFR 113.6 108.0 106.1 110.1   GLUCOSE 89 154* 146* 163*   CALCIUM 8.4* 8.0* 8.0* 8.0*   MAGNESIUM 1.8 1.7 1.7 2.2   PHOSPHORUS 3.3 3.0  --  1.7*         Lab 05/31/23  1117 05/30/23  0635 05/29/23  0604 05/28/23  0808   TOTAL PROTEIN 6.4 6.0 5.5* 5.6*   ALBUMIN 3.2* 3.0* 2.9* 2.5*   GLOBULIN 3.2 3.0 2.6 3.1   ALT (SGPT) 45* 48* 72* 102*   AST (SGOT) 33* 26 44* 91*   BILIRUBIN 0.3 0.4 0.6 0.6   ALK PHOS 104 104 102 93         Lab 05/29/23  1909 05/29/23  1658   HSTROP T 81* 77*                 Brief Urine Lab Results  (Last result in the past 365 days)        Color   Clarity   Blood   Leuk Est   Nitrite   Protein   CREAT   Urine HCG        05/28/23 0405 Yellow   Clear   Small (1+)   Negative   Negative   30 mg/dL (1+)                 Microbiology Results  (last 10 days)       Procedure Component Value - Date/Time    Respiratory Culture - Sputum, Cough [[id number]] Collected: 05/28/23 0648    Lab Status: Final result Specimen: Sputum from Cough Updated: 05/28/23 0820     Respiratory Culture Rejected     Gram Stain Rare (1+) Epithelial cells seen      Rare (1+) WBCs seen      No organisms seen    Narrative:      Specimen rejected due to oropharyngeal contamination. Please reorder and recollect specimen if clinically necessary.    Respiratory Panel PCR w/COVID-19(SARS-CoV-2) TANIKA/MARY/MINNIE/PAD/COR/MAD/ELLEN In-House, NP Swab in UTM/VTM, 3-4 HR TAT - Swab, Nasopharynx [[id number]]  (Normal) Collected: 05/27/23 0925    Lab Status: Final result Specimen: Swab from Nasopharynx Updated: 05/27/23 1104     ADENOVIRUS, PCR Not Detected     Coronavirus 229E Not Detected     Coronavirus HKU1 Not Detected     Coronavirus NL63 Not Detected     Coronavirus OC43 Not Detected     COVID19 Not Detected     Human Metapneumovirus Not Detected     Human Rhinovirus/Enterovirus Not Detected     Influenza A PCR Not Detected     Influenza B PCR Not Detected     Parainfluenza Virus 1 Not Detected     Parainfluenza Virus 2 Not Detected     Parainfluenza Virus 3 Not Detected     Parainfluenza Virus 4 Not Detected     RSV, PCR Not Detected     Bordetella pertussis pcr Not Detected     Bordetella parapertussis PCR Not Detected     Chlamydophila pneumoniae PCR Not Detected     Mycoplasma pneumo by PCR Not Detected    Narrative:      In the setting of a positive respiratory panel with a viral infection PLUS a negative procalcitonin without other underlying concern for bacterial infection, consider observing off antibiotics or discontinuation of antibiotics and continue supportive care. If the respiratory panel is positive for atypical bacterial infection (Bordetella pertussis, Chlamydophila pneumoniae, or Mycoplasma pneumoniae), consider antibiotic de-escalation to target atypical bacterial infection.     MRSA Screen, PCR (Inpatient) - Swab, Nares [[id number]]  (Normal) Collected: 05/26/23 1640    Lab Status: Final result Specimen: Swab from Nares Updated: 05/26/23 1808     MRSA PCR No MRSA Detected    Narrative:      The negative predictive value of this diagnostic test is high and should only be used to consider de-escalating anti-MRSA therapy. A positive result may indicate colonization with MRSA and must be correlated clinically.    Wound Culture - Swab, Leg, Right [[id number]]  (Abnormal)  (Susceptibility) Collected: 05/26/23 0305    Lab Status: Final result Specimen: Swab from Leg, Right Updated: 05/28/23 0848     Wound Culture Moderate growth (3+) Staphylococcus aureus     Gram Stain Many (4+) WBCs seen      Few (2+) Gram positive cocci in pairs    Susceptibility        Staphylococcus aureus      MARHTA      Clindamycin Susceptible      Erythromycin Susceptible      Inducible Clindamycin Resistance Negative      Oxacillin Susceptible      Rifampin Susceptible      Tetracycline Susceptible      Trimethoprim + Sulfamethoxazole Susceptible      Vancomycin Susceptible                       Susceptibility Comments       Staphylococcus aureus    This isolate does not demonstrate inducible clindamycin resistance in vitro.                 S. Pneumo Ag Urine or CSF - Urine, Straight Cath [[id number]]  (Normal) Collected: 05/26/23 0225    Lab Status: Final result Specimen: Urine from Straight Cath Updated: 05/26/23 1208     Strep Pneumo Ag Negative    Legionella Antigen, Urine - Urine, Straight Cath [[id number]]  (Normal) Collected: 05/26/23 0225    Lab Status: Final result Specimen: Urine from Straight Cath Updated: 05/26/23 1208     LEGIONELLA ANTIGEN, URINE Negative    Blood Culture - Blood, Arm, Left [[id number]]  (Normal) Collected: 05/26/23 0142    Lab Status: Final result Specimen: Blood from Arm, Left Updated: 05/31/23 0200     Blood Culture No growth at 5 days    Blood Culture - Blood, Arm, Left [[id number]]  (Normal)  Collected: 05/26/23 0142    Lab Status: Final result Specimen: Blood from Arm, Left Updated: 05/31/23 0200     Blood Culture No growth at 5 days          Results for orders placed in visit on 08/06/18    SCANNED VASCULAR STUDIES    Results for orders placed in visit on 08/06/18    SCANNED VASCULAR STUDIES    Results for orders placed during the hospital encounter of 05/26/23    Adult Transthoracic Echo Complete w/ Color, Spectral and Contrast if necessary per protocol    Interpretation Summary    Left ventricular ejection fraction appears to be 46 - 50%.    Left ventricular wall thickness is consistent with mild concentric hypertrophy.    Left ventricular diastolic function was indeterminate.    There is a TAVR valve present.    Moderate to severe tricuspid valve regurgitation is present.    Estimated right ventricular systolic pressure from tricuspid regurgitation is mildly elevated (35-45 mmHg).    There were no apparent intracardiac masses, vegetations or thrombi.        Electronically signed by Bull Davila MD, 06/03/23, 11:47 AM EDT.

## 2023-06-07 ENCOUNTER — OFFICE VISIT (OUTPATIENT)
Dept: FAMILY MEDICINE CLINIC | Facility: CLINIC | Age: 58
End: 2023-06-07
Payer: COMMERCIAL

## 2023-06-07 VITALS
OXYGEN SATURATION: 96 % | WEIGHT: 136.8 LBS | DIASTOLIC BLOOD PRESSURE: 66 MMHG | TEMPERATURE: 96.8 F | SYSTOLIC BLOOD PRESSURE: 106 MMHG | HEART RATE: 100 BPM | HEIGHT: 66 IN | BODY MASS INDEX: 21.98 KG/M2

## 2023-06-07 DIAGNOSIS — L97.212 VENOUS STASIS ULCER OF RIGHT CALF WITH FAT LAYER EXPOSED WITHOUT VARICOSE VEINS: ICD-10-CM

## 2023-06-07 DIAGNOSIS — Z79.4 TYPE 2 DIABETES MELLITUS WITH HYPERGLYCEMIA, WITH LONG-TERM CURRENT USE OF INSULIN: ICD-10-CM

## 2023-06-07 DIAGNOSIS — I87.2 VENOUS STASIS ULCER OF RIGHT CALF WITH FAT LAYER EXPOSED WITHOUT VARICOSE VEINS: ICD-10-CM

## 2023-06-07 DIAGNOSIS — A49.01 MSSA (METHICILLIN SUSCEPTIBLE STAPHYLOCOCCUS AUREUS) INFECTION: ICD-10-CM

## 2023-06-07 DIAGNOSIS — E11.65 TYPE 2 DIABETES MELLITUS WITH HYPERGLYCEMIA, WITH LONG-TERM CURRENT USE OF INSULIN: ICD-10-CM

## 2023-06-07 DIAGNOSIS — C14.0 THROAT CANCER: ICD-10-CM

## 2023-06-07 DIAGNOSIS — R65.21 SEPTIC SHOCK: Primary | ICD-10-CM

## 2023-06-07 DIAGNOSIS — I21.4 NSTEMI (NON-ST ELEVATED MYOCARDIAL INFARCTION): ICD-10-CM

## 2023-06-07 DIAGNOSIS — Z59.9 FINANCIAL DIFFICULTIES: ICD-10-CM

## 2023-06-07 DIAGNOSIS — A41.9 SEPTIC SHOCK: Primary | ICD-10-CM

## 2023-06-07 DIAGNOSIS — J44.9 CHRONIC OBSTRUCTIVE PULMONARY DISEASE, UNSPECIFIED COPD TYPE: ICD-10-CM

## 2023-06-07 DIAGNOSIS — J18.9 MULTIFOCAL PNEUMONIA: ICD-10-CM

## 2023-06-07 DIAGNOSIS — Z85.71 HISTORY OF HODGKIN'S LYMPHOMA: ICD-10-CM

## 2023-06-07 SDOH — ECONOMIC STABILITY - INCOME SECURITY: PROBLEM RELATED TO HOUSING AND ECONOMIC CIRCUMSTANCES, UNSPECIFIED: Z59.9

## 2023-06-07 NOTE — PROGRESS NOTES
Isha Llanes is a 57 y.o. female admitted to [facility] and  is seen for follow up.  Chief Complaint   Patient presents with    Hospital Follow Up Visit    Back Pain           6/7/2021    10:17 PM 9/8/2021     5:54 PM 5/31/2023     8:59 PM   Date of TCM Phone Call   [facility]   Date of Admission 6/6/2021 9/6/2021 5/26/2023   Date of Discharge 6/7/2021 9/8/2021 5/31/2023   Discharge Disposition Home-Health Care [facility] or Self Care Home or Self Care     Discharge summary is available.  Current outpatient and discharge medications have been reconciled for the patient.  Reviewed by: LUZ Zamora    She was admitted for the following reason(s):  Primary admitting diagnosis at discharge was     Septic shock  Lactic acidosis  Acute metabolic encephalopathy secondary to septic shock  Multifocal pneumonia  MSSA infection of right venous stasis ulcer  Acute on chronic hypoxic respiratory failure  NSTEMI secondary to septic shock  Acute kidney injury secondary to septic shock  Acute systolic congestive heart failure  Transaminitis  Hepatic cirrhosis  Hep C antibody positive  Insulin-dependent diabetes mellitus  Diabetic neuropathy  Aortic stenosis status post TAVR  Hodgkin's lymphoma  Recently diagnosed throat cancer  Chronic back pain status post pain pump implant  Hypokalemia  Hypomagnesemia  Thrombocytopenia concern secondary to cirrhosis  Wheelchair-bound    Procedures performed while hospitalized:Procedures Performed in Hospital: please see EPIC record for further details of results and finding    Pending results:  Pending tests: none and Lab results HCV quantitative    Pain Control:  well controlled    Diet: diabetic, healthy heart    Activity after Discharge: activity as tolerated    Items to be addressed at first follow up visit include:  1. Medication changes:     Discharge Medications          ASK your doctor about these medications         Instructions Start  Date   Acetaminophen Extra Strength 500 MG tablet  Generic drug: acetaminophen    1,000-1,500 mg, Oral, Every 6 Hours PRN        albuterol sulfate  (90 Base) MCG/ACT inhaler  Commonly known as: PROVENTIL HFA;VENTOLIN HFA;PROAIR HFA    2 puffs, Inhalation, Every 4 Hours PRN        bacitracin 500 UNIT/GM ointment    1 application, Topical, 2 Times Daily, to affected area        benzocaine-menthol 15-3.6 MG lozenge lozenge  Commonly known as: CEPACOL    1 lozenge, Mouth/Throat, Every 2 Hours PRN        cetirizine 10 MG tablet  Commonly known as: zyrTEC    1 tablet, Oral, Daily        clopidogrel 75 MG tablet  Commonly known as: PLAVIX    1 tablet, Oral, Daily        furosemide 40 MG tablet  Commonly known as: LASIX    1 tablet, Oral, Daily        gabapentin 300 MG capsule  Commonly known as: NEURONTIN    300 mg, Oral, 4 Times Daily        Insulin Lispro (1 Unit Dial) 100 UNIT/ML solution pen-injector  Commonly known as: HUMALOG    1-10 Units, Subcutaneous, 3 Times Daily With Meals        Levemir 100 UNIT/ML injection  Generic drug: insulin detemir    10 Units, Subcutaneous, Daily        loperamide 2 MG capsule  Commonly known as: IMODIUM    2 mg, Oral, 4 Times Daily PRN        LORazepam 0.5 MG tablet  Commonly known as: ATIVAN    0.5 mg, Oral, Daily PRN        pain  patient supplied pump    Intrathecal, Continuous,  Type of Medication: Hydromorphone  and Bupivacaine Provider:Dr. Boudreaux Provider number: [phone] Basal Dose: Hydromorphone 7.518 mg/day Bupivacaine 0.13824 mg/day ( MAX of Hydromorphone 9.940 mg/ day; Bupivacaine 0.65886 mg /day) Bolus Dose:Hydromorphone 0.500 mg, Bupivacaine 0.62639 mg Lockout 3 hours. 1 Bolus per every 3 hours max of 5 bolus per day Next refill due: July 13 2023 Pump manufacture:[employer]            pantoprazole 40 MG EC tablet  Commonly known as: PROTONIX    40 mg, Oral, Daily           She reports her overall condition is are improving since discharge.  No specific  "complaints.  Social support is said to be not adequate to meet her needs. Home health.     Objective   Vitals:    06/07/23 1058   BP: 106/66   Pulse: 100   Temp: 96.8 °F (36 °C)   SpO2: 96%   Weight: 62.1 kg (136 lb 12.8 oz)   Height: 167.6 cm (66\")     Body mass index is 22.08 kg/m².  Physical Exam  Vitals reviewed.   Constitutional:       Appearance: Normal appearance. She is well-developed.   HENT:      Head: Normocephalic and atraumatic.      Right Ear: External ear normal.      Left Ear: External ear normal.      Mouth/Throat:      Pharynx: No oropharyngeal exudate.   Eyes:      Conjunctiva/sclera: Conjunctivae normal.      Pupils: Pupils are equal, round, and reactive to light.   Cardiovascular:      Rate and Rhythm: Normal rate and regular rhythm.      Heart sounds: No murmur heard.    No friction rub. No gallop.   Pulmonary:      Effort: Pulmonary effort is normal.      Breath sounds: Normal breath sounds. No wheezing or rhonchi.   Abdominal:      General: Bowel sounds are normal. There is no distension.      Palpations: Abdomen is soft.      Tenderness: There is no abdominal tenderness.   Skin:     General: Skin is warm and dry.      Findings: Wound present.      Comments: Right lower calf   Neurological:      Mental Status: She is alert and oriented to person, place, and time.   Psychiatric:         Mood and Affect: Mood and affect normal.         Behavior: Behavior normal.         Thought Content: Thought content normal.         Judgment: Judgment normal.       Assessment & Plan   Problem List Items Addressed This Visit          Endocrine and Metabolic    Diabetes mellitus, type 2       Hematology and Neoplasia    History of Hodgkin's lymphoma       Infectious Diseases    MSSA (methicillin susceptible Staphylococcus aureus) infection    Septic shock - Primary       Musculoskeletal and Injuries    Venous stasis ulcer of right calf       Pulmonary and Pneumonias    Chronic obstructive pulmonary disease "     Other Visit Diagnoses       Multifocal pneumonia        NSTEMI (non-ST elevated myocardial infarction)        Financial difficulties        Relevant Orders    Ambulatory Referral to Social Care Services (Amb Case Mgmt)    Throat cancer              She left AMA from the hospital.  She has home health coming out to help with dressing changes of her wounds on her right lower extremities.  Today she had taken off the dressing due to the rain.  The wound on the lower extremity shows yellow slough or fat exposure.  She had a positive hepatitis C antibody.  She has previously had an HCVRNA last year that was negative.  Test is pending for an additional HCV RNA.  She had the elevated liver enzymes.  She had a cardiac cath while within the hospital that was within normal limits.  She has a follow-up appointment with gastroenterology on June 29.  She has a an appointment with  radiation oncology for her new diagnosis of throat cancer.  She completed antibiotic treatment within the hospital.  She does have positive MSSA.  She received antibiotic treatment for multifocal pneumonia and MSSA.  We will have her continue with home health.  Follow-up with her radiation oncology and gastroenterology.  She was evaluated for her non-STEMI with Dr. Domenic Zurita.  We will put in a consultation to social work for her financial difficulty.

## 2023-06-08 ENCOUNTER — PATIENT OUTREACH (OUTPATIENT)
Dept: CASE MANAGEMENT | Facility: OTHER | Age: 58
End: 2023-06-08
Payer: COMMERCIAL

## 2023-06-08 ENCOUNTER — REFERRAL TRIAGE (OUTPATIENT)
Dept: CASE MANAGEMENT | Facility: OTHER | Age: 58
End: 2023-06-08
Payer: COMMERCIAL

## 2023-06-08 ENCOUNTER — TELEPHONE (OUTPATIENT)
Dept: OTOLARYNGOLOGY | Facility: CLINIC | Age: 58
End: 2023-06-08
Payer: COMMERCIAL

## 2023-06-08 LAB
QT INTERVAL: 365 MS
QT INTERVAL: 373 MS
QT INTERVAL: 390 MS
QT INTERVAL: 396 MS

## 2023-06-08 NOTE — TELEPHONE ENCOUNTER
Called the patient regarding her missed 1 week post op appointment that was scheduled on 06/01 with Dr Mcgarry. I have rescheduled that appointment for 06/13 and attempted to reach out to the patient with new appointment date and time. I will mail an appointment reminder to the patient and I will try to reach back out the patient has well.

## 2023-06-08 NOTE — OUTREACH NOTE
Patient Outreach    MSW had referral from patient's PCP regarding patient's need for financial resources. Patient states that she was in the hospital and fell behind on her bills and had unexpected costs. MSW emailed patient list of financial assistance programs and agencies in the area that can assist with resources. MSW to f/u with patient.    Monica RUIZ -   Ambulatory Case Management    6/8/2023, 15:51 EDT

## 2023-06-09 RX ORDER — PSEUDOEPHED/ACETAMINOPH/DIPHEN 30MG-500MG
TABLET ORAL
Qty: 60 TABLET | Refills: 3 | OUTPATIENT
Start: 2023-06-09

## 2023-06-13 ENCOUNTER — TELEPHONE (OUTPATIENT)
Dept: OTOLARYNGOLOGY | Facility: CLINIC | Age: 58
End: 2023-06-13

## 2023-06-13 NOTE — TELEPHONE ENCOUNTER
Patient missed 2nd post op appointment. I let her know she needed to come to this appointment when I rescheduled her last missed post op appointment. She stated she had a lot going on and she would try, but probably would not be able to make it. Patient was sent a no show letter.

## 2023-06-16 ENCOUNTER — PATIENT OUTREACH (OUTPATIENT)
Dept: CASE MANAGEMENT | Facility: OTHER | Age: 58
End: 2023-06-16
Payer: COMMERCIAL

## 2023-06-16 ENCOUNTER — TELEPHONE (OUTPATIENT)
Dept: FAMILY MEDICINE CLINIC | Facility: CLINIC | Age: 58
End: 2023-06-16
Payer: COMMERCIAL

## 2023-06-16 NOTE — TELEPHONE ENCOUNTER
Patient stated that she understood and is requesting to be sent to a diabetic specialist. Please advise.

## 2023-06-16 NOTE — TELEPHONE ENCOUNTER
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 She stated that she is not feeling well. And she is asking for more antibiotics for her legs. Stated that the levemir she can't take any more because its causing her to itch at the site of injection and making her feel\" out of sorts\". Patient stated that her blood sugars have been running in between 138 and 198. Please advise.    "

## 2023-06-16 NOTE — OUTREACH NOTE
Patient Outreach    MSW sent patient resources for financial assistance. MSW attempted to reach patient via phone and UTR. MSW available should patient need further assistance in the future.    Monica RUIZ -   Ambulatory Case Management    6/16/2023, 08:59 EDT

## 2023-06-19 ENCOUNTER — CONSULT (OUTPATIENT)
Dept: RADIATION ONCOLOGY | Facility: HOSPITAL | Age: 58
End: 2023-06-19
Payer: COMMERCIAL

## 2023-06-19 VITALS
DIASTOLIC BLOOD PRESSURE: 82 MMHG | RESPIRATION RATE: 16 BRPM | HEART RATE: 100 BPM | TEMPERATURE: 98 F | BODY MASS INDEX: 25.01 KG/M2 | WEIGHT: 154.98 LBS | OXYGEN SATURATION: 97 % | SYSTOLIC BLOOD PRESSURE: 102 MMHG

## 2023-06-19 DIAGNOSIS — F51.01 PRIMARY INSOMNIA: ICD-10-CM

## 2023-06-19 DIAGNOSIS — C32.1 SQUAMOUS CELL CARCINOMA OF EPIGLOTTIS: Primary | ICD-10-CM

## 2023-06-19 DIAGNOSIS — Z79.4 TYPE 2 DIABETES MELLITUS WITH HYPERGLYCEMIA, WITH LONG-TERM CURRENT USE OF INSULIN: Primary | ICD-10-CM

## 2023-06-19 DIAGNOSIS — E11.65 TYPE 2 DIABETES MELLITUS WITH HYPERGLYCEMIA, WITH LONG-TERM CURRENT USE OF INSULIN: Primary | ICD-10-CM

## 2023-06-19 DIAGNOSIS — C32.9 LARYNGEAL CANCER: ICD-10-CM

## 2023-06-19 DIAGNOSIS — F51.01 PRIMARY INSOMNIA: Primary | ICD-10-CM

## 2023-06-19 PROBLEM — C81.90 HODGKIN LYMPHOMA: Status: ACTIVE | Noted: 2023-06-19

## 2023-06-19 LAB
ALBUMIN SERPL-MCNC: 4.1 G/DL (ref 3.5–5.2)
ALBUMIN/GLOB SERPL: 1.1 G/DL
ALP SERPL-CCNC: 105 U/L (ref 39–117)
ALT SERPL W P-5'-P-CCNC: 8 U/L (ref 1–33)
ANION GAP SERPL CALCULATED.3IONS-SCNC: 11 MMOL/L (ref 5–15)
AST SERPL-CCNC: 13 U/L (ref 1–32)
BILIRUB SERPL-MCNC: 0.2 MG/DL (ref 0–1.2)
BUN SERPL-MCNC: 9 MG/DL (ref 6–20)
BUN/CREAT SERPL: 19.6 (ref 7–25)
CALCIUM SPEC-SCNC: 9.2 MG/DL (ref 8.6–10.5)
CHLORIDE SERPL-SCNC: 98 MMOL/L (ref 98–107)
CO2 SERPL-SCNC: 29 MMOL/L (ref 22–29)
CREAT SERPL-MCNC: 0.46 MG/DL (ref 0.57–1)
EGFRCR SERPLBLD CKD-EPI 2021: 111.8 ML/MIN/1.73
GLOBULIN UR ELPH-MCNC: 3.8 GM/DL
GLUCOSE SERPL-MCNC: 133 MG/DL (ref 65–99)
POTASSIUM SERPL-SCNC: 3.9 MMOL/L (ref 3.5–5.2)
PROT SERPL-MCNC: 7.9 G/DL (ref 6–8.5)
SODIUM SERPL-SCNC: 138 MMOL/L (ref 136–145)

## 2023-06-19 PROCEDURE — 80053 COMPREHEN METABOLIC PANEL: CPT | Performed by: RADIOLOGY

## 2023-06-19 PROCEDURE — 1125F AMNT PAIN NOTED PAIN PRSNT: CPT | Performed by: RADIOLOGY

## 2023-06-19 PROCEDURE — 99204 OFFICE O/P NEW MOD 45 MIN: CPT | Performed by: RADIOLOGY

## 2023-06-19 PROCEDURE — 36415 COLL VENOUS BLD VENIPUNCTURE: CPT | Performed by: RADIOLOGY

## 2023-06-19 PROCEDURE — G0463 HOSPITAL OUTPT CLINIC VISIT: HCPCS | Performed by: RADIOLOGY

## 2023-06-19 RX ORDER — ALPRAZOLAM 2 MG/1
2 TABLET ORAL NIGHTLY PRN
Qty: 30 TABLET | Refills: 0 | Status: SHIPPED | OUTPATIENT
Start: 2023-06-19 | End: 2023-06-19

## 2023-06-19 RX ORDER — ALPRAZOLAM 0.5 MG/1
0.5 TABLET ORAL NIGHTLY PRN
Qty: 30 TABLET | Refills: 0 | Status: SHIPPED | OUTPATIENT
Start: 2023-06-19

## 2023-06-19 NOTE — PROGRESS NOTES
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  Ms. Llanes underwent evaluation by Dr. Mcgarry for progressive sore throat and hoarseness.  Direct laryngoscopy, esophagoscopy and bronchoscopy was performed on 5/22/2023 revealing a large lesion superficially covering the entire laryngeal surface of epiglottis with erosion in the midportion.  The base of tongue appeared clear.  Pathology from biopsy revealed moderately to poorly differentiated squamous cell carcinoma.  PET/CT on 5/4/2023 revealed focal new hypermetabolic activity within the right aspect of the vallecula at the deep right base of tongue.  There is no evidence of cervical lymphadenopathy or distant metastatic disease.    Subjective          Review of Systems: Review of Systems   Constitutional:  Positive for appetite change (decreased), fatigue and unexpected weight change (10# wt loss in last couple months).   HENT:  Positive for sore throat, tinnitus, trouble swallowing (occasional) and voice change (hoarsness). Negative for ear pain.    Eyes:  Positive for visual disturbance (blurred vision last couple months).   Respiratory:  Positive for cough (productive). Negative for shortness of breath.    Cardiovascular:  Negative for chest pain and palpitations.   Gastrointestinal:  Positive for constipation (waxes and wanes), diarrhea (waxes and wanes) and nausea.   Genitourinary:  Positive for difficulty urinating (occasional hesitancy) and frequency. Negative for dysuria  and urgency.   Musculoskeletal:  Positive for arthralgias, back pain and neck pain.   Skin:  Positive for wound (diabetic ulcers BLE). Negative for rash.   Neurological:  Positive for weakness. Negative for dizziness and headaches.        Very lethargic, keeps falling asleep during consult and questions     Psychiatric/Behavioral:  Positive for sleep disturbance.      Past Medical History:   Past Medical History:   Diagnosis Date    Anesthesia     REPORTS HAS GOT REAL EMOTIONAL AND HAS BECOME ANGRY BEFORE    Anxiety     Aortic stenosis, severe     HAS BIO VALVE REPLACED    Arthritis     Asthma     Back pain     Blood clotting disorder     Cancer     Cancer associated pain     HODGKINS LYMPHOMA    CHF (congestive heart failure)     Chronic low back pain with sciatica     Chronic pain disorder     COPD (chronic obstructive pulmonary disease)     Coronary artery disease     Diabetes mellitus     TYPE 2    Diabetes mellitus     Dyspnea on effort     GERD (gastroesophageal reflux disease)     H/O lumpectomy     H/O: hysterectomy     Hiatal hernia     History of degenerative disc disease     History of kidney stones     History of pulmonary embolus (PE)     History of transfusion     AS A CHILD NO TRANSFUSION REACTION    History of transfusion     Hodgkin's lymphoma     5/19/23  5 YEARS SINCE LAST CHEMO TX    Hypertension     Immobility     Liver disease     DENIES ANY CURRENT ISSUES    Lupus     Mitral valve prolapse     Panic disorder     Pelvic pain     Peripheral neuropathy     Personal history of DVT (deep vein thrombosis)     Presence of intrathecal pump     PTSD (post-traumatic stress disorder)     Sacroiliac joint disease     SI (sacroiliac) joint inflammation     Sleep apnea     Venous insufficiency        Past Surgical History:   Past Surgical History:   Procedure Laterality Date    ABDOMINAL SURGERY      BACK SURGERY      SPINAL FUSION     BACK SURGERY      BLADDER REPAIR      CARDIAC CATHETERIZATION N/A  03/06/2019    Procedure: Valvuloplasty;  Surgeon: Wayne Johnson MD;  Location: [facility] CATH INVASIVE LOCATION;  Service: Cardiology    CARDIAC CATHETERIZATION      CARDIAC CATHETERIZATION Left 5/31/2023    Procedure: Cardiac Catheterization/Vascular Study;  Surgeon: Poncho Reid MD;  Location: [facility] CATH INVASIVE LOCATION;  Service: Cardiovascular;  Laterality: Left;    CARDIAC SURGERY      CARDIAC VALVE REPLACEMENT  2021    CHOLECYSTECTOMY      COLONOSCOPY N/A 12/29/2021    Procedure: COLONOSCOPY;  Surgeon: Karine Orlando MD;  Location: [facility] ENDOSCOPY;  Service: Gastroenterology;  Laterality: N/A;  POOR PREP    COLONOSCOPY N/A 04/13/2022    Procedure: COLONOSCOPY WITH POLYPECTOMY;  Surgeon: Karine Orlando MD;  Location: [facility] ENDOSCOPY;  Service: Gastroenterology;  Laterality: N/A;  COLON POLYP, HEMORRHOIDS, POOR PREP    COLONOSCOPY      DIRECT LARYNGOSCOPY, ESOPHAGOSCOPY, BRONCHOSCOPY N/A 5/22/2023    Procedure: DIRECT LARYNGOSCOPY WITH BIOPSIES , ESOPHAGOSCOPY, BRONCHOSCOPY;  Surgeon: Ronnie Mcgarry MD;  Location: [facility] OR OSC;  Service: ENT;  Laterality: N/A;    ENDOSCOPY N/A 12/29/2021    Procedure: ESOPHAGOGASTRODUODENOSCOPY;  Surgeon: Karine Orlando MD;  Location: [facility] ENDOSCOPY;  Service: Gastroenterology;  Laterality: N/A;  ESOPHAGITIS, GASTRITIS, HIATAL HERNIA    ENDOSCOPY      HYSTERECTOMY      LYMPHADENECTOMY      PAIN PUMP INSERTION/REVISION N/A 10/18/2019    Procedure: PAIN PUMP INSERTION [facility] 10-18-19 [place];  Surgeon: Horace Rios MD;  Location: [facility] MAIN OR;  Service: Pain Management    PAIN PUMP INSERTION/REVISION N/A 11/23/2020    Procedure: pain pump removal;  Surgeon: Horace Rios MD;  Location: [facility] MAIN OR;  Service: Pain Management;  Laterality: N/A;    PORTACATH PLACEMENT      IN LEFT CHEST REPORTS THAT IT IS NOT FUNCTIONING    SKIN BIOPSY      TONSILLECTOMY      TUBAL ABDOMINAL LIGATION      TUMOR REMOVAL          Family History:   Family History   Problem Relation Age of Onset    Heart failure Mother     Anxiety disorder Mother     Prostate cancer Father     Depression Sister     Bipolar disorder Sister     Pancreatic cancer Sister     ADD / ADHD Brother     Thyroid cancer Maternal Grandmother     Pancreatic cancer Paternal Grandmother     ADD / ADHD Grandson     ADD / ADHD Granddaughter     ADD / ADHD Nephew     Malig Hyperthermia Neg Hx        Social History:   Social History     Socioeconomic History    Marital status: Legally    Tobacco Use    Smoking status: Every Day     Packs/day: 1.50     Years: 20.00     Pack years: 30.00     Types: Cigarettes     Start date: 1979    Smokeless tobacco: Never   Vaping Use    Vaping Use: Never used   Substance and Sexual Activity    Alcohol use: Never    Drug use: Never    Sexual activity: Not Currently       Medications:     Current Outpatient Medications:     acetaminophen (TYLENOL) 500 MG tablet, Take 2-3 tablets by mouth Every 6 (Six) Hours As Needed for Mild Pain., Disp: 60 tablet, Rfl: 3    albuterol sulfate  (90 Base) MCG/ACT inhaler, inhale TWO puffs BY MOUTH EVERY 4 HOURS AS NEEDED for wheezing, Disp: 18 g, Rfl: 2    bacitracin 500 UNIT/GM ointment, Apply 1 application topically to the appropriate area as directed 2 (Two) Times a Day., Disp: 28 g, Rfl: 5    clopidogrel (PLAVIX) 75 MG tablet, Take 1 tablet by mouth Daily., Disp: , Rfl:     furosemide (LASIX) 40 MG tablet, TAKE ONE TABLET BY MOUTH EVERY DAY, Disp: 90 tablet, Rfl: 1    gabapentin (NEURONTIN) 300 MG capsule, TAKE ONE CAPSULE BY MOUTH THREE TIMES DAILY (Patient taking differently: Take 1 capsule by mouth 4 (Four) Times a Day As Needed.), Disp: 270 capsule, Rfl: 1    HYDROmorphone (DILAUDID) 1 mg/mL solution, Infuse  into a venous catheter Dose Adjusted By Provider As Needed. PER PAIN PUMP, Disp: , Rfl:     loperamide (IMODIUM) 2 MG capsule, Take 1 capsule by mouth 4 (Four) Times a Day As  "Needed for Diarrhea., Disp: , Rfl:     LORazepam (ATIVAN) 0.5 MG tablet, Take 1 tablet by mouth Daily As Needed., Disp: , Rfl:     ondansetron ODT (ZOFRAN-ODT) 4 MG disintegrating tablet, Place 1 tablet on the tongue Every 8 (Eight) Hours As Needed for Nausea or Vomiting., Disp: 15 tablet, Rfl: 0    Acetaminophen Extra Strength 500 MG tablet, Take 2-3 tablets by mouth Every 6 (Six) Hours As Needed for Mild Pain., Disp: , Rfl:     ALPRAZolam (Xanax) 2 MG tablet, Take 1 tablet by mouth At Night As Needed for Anxiety., Disp: 30 tablet, Rfl: 0    B-D UF III MINI PEN NEEDLES 31G X 5 MM misc, Inject 100 each under the skin into the appropriate area as directed 4 (Four) Times a Day., Disp: 100 each, Rfl: 12    benzocaine-menthol (CEPACOL) 15-3.6 MG lozenge lozenge, Dissolve 1 each in the mouth Every 2 (Two) Hours As Needed. (Patient not taking: Reported on 6/19/2023), Disp: , Rfl:     cetirizine (zyrTEC) 10 MG tablet, Take 1 tablet by mouth Daily. (Patient not taking: Reported on 6/19/2023), Disp: , Rfl:     Elastic Bandages & Supports (ACE Elastic Bandage 4\") misc, 1 Device Daily. One on each leg, bilateral.   #24 = one month supply., Disp: 24 each, Rfl: 3    FREESTYLE LITE test strip, by Other route 4 (Four) Times a Day. Use to test blood sugar 4 times daily, Disp: , Rfl:     glucose blood test strip, Use as instructed, Disp: 100 each, Rfl: 12    glucose monitor monitoring kit, 1 each As Needed (Test blood sugar TID)., Disp: 1 each, Rfl: 0    insulin detemir (LEVEMIR) 100 UNIT/ML injection, Inject 10 Units under the skin into the appropriate area as directed Daily for 90 days. (Patient not taking: Reported on 6/19/2023), Disp: 9 mL, Rfl: 3    Insulin Lispro, 1 Unit Dial, (HUMALOG) 100 UNIT/ML solution pen-injector, Inject 1-10 Units under the skin into the appropriate area as directed 3 (Three) Times a Day With Meals. (Patient not taking: Reported on 6/19/2023), Disp: , Rfl:     Insulin Syringe 31G X 5/16\" 1 ML misc, 1 " each Daily., Disp: 100 each, Rfl: 2    Lancets misc, 1 each 3 (Three) Times a Day., Disp: 100 each, Rfl: 12    pain patient supplied pump, by Intrathecal route Continuous.  Type of Medication: Hydromorphone  and Bupivacaine Provider:Dr. Boudreaux Provider number: [phone] Basal Dose: Hydromorphone 7.518 mg/day Bupivacaine 0.35070 mg/day ( MAX of Hydromorphone 9.940 mg/ day; Bupivacaine 0.38067 mg /day) Bolus Dose:Hydromorphone 0.500 mg, Bupivacaine 0.54775 mg Lockout 3 hours. 1 Bolus per every 3 hours max of 5 bolus per day Next refill due: July 13 2023 Pump manufacture:[employer], Disp: , Rfl:     pantoprazole (PROTONIX) 40 MG EC tablet, Take 1 tablet by mouth Daily. (Patient not taking: Reported on 6/19/2023), Disp: , Rfl:     SITagliptin (JANUVIA) 25 MG tablet, Daily. (Patient not taking: Reported on 6/19/2023), Disp: , Rfl:     Allergies:   Allergies   Allergen Reactions    Amoxicillin Shortness Of Breath    Ceclor [Cefaclor] Shortness Of Breath    Penicillins Shortness Of Breath    Sulfa Antibiotics Rash    Valium [Diazepam] Mental Status Change     DEPRESSED    Ambien [Zolpidem] Mental Status Change    Aspirin GI Intolerance    Ativan [Lorazepam] Mental Status Change    Benadryl [Diphenhydramine] Anxiety     AND MAKES HER HYPER    Biaxin [Clarithromycin] Unknown - Low Severity    Cefazolin Unknown - Low Severity    Cephalexin Unknown - Low Severity    Cephalosporins Unknown - Low Severity    Clindamycin Unknown - Low Severity    Compazine [Prochlorperazine Edisylate] Rash    Contrast Dye (Echo Or Unknown Ct/Mr) Other (See Comments)     Caused pain in her arm    Doxycycline Rash    Eggs Or Egg-Derived Products GI Intolerance    Nsaids GI Intolerance    Phenergan [Promethazine Hcl] GI Intolerance    Promethazine GI Intolerance    Vancomycin Itching       Pain: (on a scale of 0-10)   Pain Score    06/19/23 1116   PainSc:   7   PainLoc: Back  Comment: back and R hip pain       Advanced Care Plan: N   Quality of  Life: 40 - Must Use Wheelchair Most of Time    Objective     Physical Exam:   Vital Signs:   Vitals:    06/19/23 1116   BP: 102/82   Pulse: 100   Resp: 16   Temp: 98 °F (36.7 °C)   TempSrc: Temporal   SpO2: 97%   Weight: 70.3 kg (154 lb 15.7 oz)   PainSc:   7   PainLoc: Back  Comment: back and R hip pain     Body mass index is 25.01 kg/m².     Physical Exam  Constitutional:       General: She is not in acute distress.     Appearance: Normal appearance. She is normal weight. She is not toxic-appearing.   HENT:      Head: Normocephalic and atraumatic.      Mouth/Throat:      Comments: Edentulous   Pulmonary:      Effort: Pulmonary effort is normal. No respiratory distress.   Abdominal:      General: Abdomen is flat. There is no distension.   Skin:     General: Skin is warm and dry.   Neurological:      General: No focal deficit present.      Mental Status: She is oriented to person, place, and time.      Cranial Nerves: No cranial nerve deficit.      Gait: Gait normal.   Psychiatric:         Mood and Affect: Mood normal.         Behavior: Behavior normal.         Judgment: Judgment normal.   Could not tolerate nasopharyngoscopy    Results:   Radiographs: CT Abdomen Pelvis Without Contrast    Result Date: 5/26/2023    1. Hepatic steatosis. 2. Nonobstructing right renal stone. 3. Mild pelvic ascites.     MORALES TESFAYE MD       Electronically Signed and Approved By: MORALES TESFAYE MD on 5/26/2023 at 17:55             CT Head Without Contrast    Result Date: 5/26/2023   No acute brain abnormality is seen. Specifically, no acute intracranial hemorrhage, no acute infarct, no significant intracranial mass lesion, and no acute intracranial mass effect are appreciated.   The study is motion-limited.    Please note that portions of this note were completed with a voice recognition program.  JEN JONES JR, MD       Electronically Signed and Approved By: JEN JONES JR, MD on 5/26/2023 at 2:22              CT Chest  Without Contrast Diagnostic    Result Date: 5/26/2023    1. Patchy bilateral consolidations worse in the left upper lobe, suggesting multifocal pneumonia. 2. Small right pleural effusion with bibasilar atelectasis. 3. Moderate emphysema.     MORALES TESFAYE MD       Electronically Signed and Approved By: MORALES TESFAYE MD on 5/26/2023 at 17:50             XR Chest 1 View    Result Date: 5/28/2023    Multifocal patchy airspace opacities present bilaterally, likely related to pneumonia.  This is likely superimposed on a mild pulmonary edema pattern.  No significant pleural effusion.       MELANIE CHOE MD       Electronically Signed and Approved By: MELANIE CHOE MD on 5/28/2023 at 9:24             XR Chest 1 View    Result Date: 5/26/2023    No acute infiltrate is appreciated.    Please note that portions of this note were completed with a voice recognition program.  JEN JONES JR, MD       Electronically Signed and Approved By: JEN JONES JR, MD on 5/26/2023 at 2:27             I personally reviewed the PET/CT.  The pertinent findings are as above in HPI.    Pathology: I personally reviewed the pathology report from the procedure performed on 5/22/2023.  The pertinent findings are as above in HPI.    Labs:   WBC   Date Value Ref Range Status   05/31/2023 4.51 3.40 - 10.80 10*3/mm3 Final   04/30/2021 7.36 4.5 - 11.0 10*3/uL Final     Hemoglobin   Date Value Ref Range Status   05/31/2023 12.1 12.0 - 15.9 g/dL Final   04/30/2021 11.4 (L) 12.0 - 16.0 g/dL Final     Hematocrit   Date Value Ref Range Status   05/31/2023 38.0 34.0 - 46.6 % Final   04/30/2021 37.3 36.0 - 46.0 % Final     Platelets   Date Value Ref Range Status   05/31/2023 91 (L) 140 - 450 10*3/mm3 Final   04/30/2021 116 (L) 140 - 440 10*3/uL Final       Assessment / Plan      Assessment/Plan:   Isha Llanes is a 57-year-old female with apparent T2  N0 M0 poorly differentiated 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  She has  involvement of the laryngeal side of the epiglottis.  She has no clinical or radiographic evidence of regional or distant metastatic disease.  ECOG 2    I discussed the clinical, radiographic and pathologic findings today with Ms. Llanes.  I explained the role of radiotherapy in the definitive management of laryngeal cancer, outlining the rationale for this approach.  Understanding the risks, potential benefits and alternatives to radiation, Ms. Llanes is agreeable to my recommendation and is scheduled for CT simulation for treatment planning purposes.  She will undergo evaluation for enteral feeding tube placement before delivery of radiotherapy.      Goyo Nunez MD  Radiation Oncology  [facility]    This document has been signed by Goyo Nunez MD on June 19, 2023 13:29 EDT

## 2023-06-23 PROBLEM — C32.1 MALIGNANT NEOPLASM OF SUPRAGLOTTIS: Status: ACTIVE | Noted: 2023-06-23

## 2023-07-03 ENCOUNTER — HOSPITAL ENCOUNTER (OUTPATIENT)
Dept: RADIATION ONCOLOGY | Facility: HOSPITAL | Age: 58
Setting detail: RADIATION/ONCOLOGY SERIES
End: 2023-07-03
Payer: COMMERCIAL

## 2023-07-03 PROCEDURE — 77386: CPT | Performed by: RADIOLOGY

## 2023-07-03 PROCEDURE — 77300 RADIATION THERAPY DOSE PLAN: CPT | Performed by: RADIOLOGY

## 2023-07-03 PROCEDURE — 77301 RADIOTHERAPY DOSE PLAN IMRT: CPT | Performed by: RADIOLOGY

## 2023-07-03 PROCEDURE — 77338 DESIGN MLC DEVICE FOR IMRT: CPT | Performed by: RADIOLOGY

## 2023-07-03 PROCEDURE — 77427 RADIATION TX MANAGEMENT X5: CPT | Performed by: RADIOLOGY

## 2023-07-05 PROCEDURE — 77014 CHG CT GUIDANCE RADIATION THERAPY FLDS PLACEMENT: CPT | Performed by: RADIOLOGY

## 2023-07-05 PROCEDURE — 77386: CPT | Performed by: RADIOLOGY

## 2023-07-07 PROCEDURE — 77386: CPT | Performed by: RADIOLOGY

## 2023-07-07 PROCEDURE — 77014 CHG CT GUIDANCE RADIATION THERAPY FLDS PLACEMENT: CPT | Performed by: RADIOLOGY

## 2023-07-11 PROCEDURE — 77386: CPT | Performed by: RADIOLOGY

## 2023-07-11 PROCEDURE — 77014 CHG CT GUIDANCE RADIATION THERAPY FLDS PLACEMENT: CPT | Performed by: RADIOLOGY

## 2023-07-12 PROCEDURE — 77336 RADIATION PHYSICS CONSULT: CPT | Performed by: RADIOLOGY

## 2023-07-12 PROCEDURE — 77014 CHG CT GUIDANCE RADIATION THERAPY FLDS PLACEMENT: CPT | Performed by: RADIOLOGY

## 2023-07-12 PROCEDURE — 77386: CPT | Performed by: RADIOLOGY

## 2023-07-13 PROCEDURE — 77014 CHG CT GUIDANCE RADIATION THERAPY FLDS PLACEMENT: CPT | Performed by: RADIOLOGY

## 2023-07-13 PROCEDURE — 77386: CPT | Performed by: RADIOLOGY

## 2023-07-13 PROCEDURE — 77427 RADIATION TX MANAGEMENT X5: CPT | Performed by: RADIOLOGY

## 2023-07-14 PROCEDURE — 77014 CHG CT GUIDANCE RADIATION THERAPY FLDS PLACEMENT: CPT | Performed by: RADIOLOGY

## 2023-07-14 PROCEDURE — 77386: CPT | Performed by: RADIOLOGY

## 2023-07-18 PROCEDURE — 77386: CPT | Performed by: RADIOLOGY

## 2023-07-18 PROCEDURE — 77014 CHG CT GUIDANCE RADIATION THERAPY FLDS PLACEMENT: CPT | Performed by: RADIOLOGY

## 2023-07-19 PROCEDURE — 77014 CHG CT GUIDANCE RADIATION THERAPY FLDS PLACEMENT: CPT | Performed by: RADIOLOGY

## 2023-07-19 PROCEDURE — 77386: CPT | Performed by: RADIOLOGY

## 2023-07-20 ENCOUNTER — TELEPHONE (OUTPATIENT)
Dept: FAMILY MEDICINE CLINIC | Facility: CLINIC | Age: 58
End: 2023-07-20

## 2023-07-20 PROCEDURE — 77014 CHG CT GUIDANCE RADIATION THERAPY FLDS PLACEMENT: CPT | Performed by: RADIOLOGY

## 2023-07-20 PROCEDURE — 77386: CPT | Performed by: RADIOLOGY

## 2023-07-20 PROCEDURE — 77336 RADIATION PHYSICS CONSULT: CPT | Performed by: RADIOLOGY

## 2023-07-20 NOTE — TELEPHONE ENCOUNTER
Caller: Isha Llanes    Relationship: Self    Best call back number: [phone]     What orders are you requesting (i.e. lab or imaging): SUPPLIES FOR BOTH LEGS    In what timeframe would the patient need to come in: ASAP    Where will you receive your lab/imaging services: [facility]    Additional notes:  PATIENT STATED SHE NEEDS SUPPLIED FOR BOTH LEGS AND NEEDS MORE bacitracin 500 UNIT/GM ointment

## 2023-07-20 NOTE — H&P (VIEW-ONLY)
Chief Complaint:  No chief complaint on file.    Primary Care Provider: Vidal Luevano APRN    Referring Provider: Goyo Nunez MD    History of Present Illness  Isha Llanes is a 57 y.o. female referred by Goyo Nunez MD to have a PEG tubed placed.  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  Patient is receiving radiation and is having difficulty swallowing and is losing weight.  PEG tube placement is needed.   Patient takes Plavix.  She stopped taking it because she thought the feeding tube was going to be placed today.    Allergies: Amoxicillin, Ceclor [cefaclor], Penicillins, Sulfa antibiotics, Valium [diazepam], Ambien [zolpidem], Aspirin, Ativan [lorazepam], Benadryl [diphenhydramine], Biaxin [clarithromycin], Cefazolin, Cephalexin, Cephalosporins, Clindamycin, Compazine [prochlorperazine edisylate], Contrast dye (echo or unknown ct/mr), Doxycycline, Eggs or egg-derived products, Nsaids, Phenergan [promethazine hcl], Promethazine, and Vancomycin    Outpatient Medications Marked as Taking for the 7/21/23 encounter (Office Visit) with Kody Mercer MD   Medication Sig Dispense Refill    Acetaminophen Extra Strength 500 MG tablet Take 2-3 tablets by mouth Every 6 (Six) Hours As Needed for Mild Pain.      Acetaminophen Extra Strength 500 MG tablet TAKE 2 TO 3 TABLETS BY MOUTH EVERY 6 HOURS AS NEEDED FOR MILD PAIN 60 tablet 3    ALPRAZolam (Xanax) 0.5 MG tablet Take 1 tablet by mouth At Night As Needed for Anxiety. 20 tablet 0    B-D UF III MINI PEN NEEDLES 31G X 5 MM misc Inject 100 each under the skin into the appropriate area as directed 4 (Four) Times a Day. 100 each 12    bacitracin 500 UNIT/GM ointment Apply 1 application topically to the appropriate area as directed 2 (Two) Times a Day. 28 g 5    cetirizine (zyrTEC) 10 MG tablet Take 1 tablet by mouth Daily.      clopidogrel (PLAVIX) 75 MG tablet Take 1  "tablet by mouth Daily.      Elastic Bandages & Supports (ACE Elastic Bandage 4\") misc 1 Device Daily. One on each leg, bilateral.   #24 = one month supply. 24 each 3    FREESTYLE LITE test strip by Other route 4 (Four) Times a Day. Use to test blood sugar 4 times daily      furosemide (LASIX) 40 MG tablet TAKE ONE TABLET BY MOUTH EVERY DAY 90 tablet 1    gabapentin (NEURONTIN) 300 MG capsule TAKE ONE CAPSULE BY MOUTH THREE TIMES DAILY (Patient taking differently: Take 1 capsule by mouth 4 (Four) Times a Day As Needed.) 270 capsule 1    glucose blood test strip Use as instructed 100 each 12    glucose monitor monitoring kit 1 each As Needed (Test blood sugar TID). 1 each 0    HYDROmorphone (DILAUDID) 1 mg/mL solution Infuse  into a venous catheter Dose Adjusted By Provider As Needed. PER PAIN PUMP      insulin detemir (LEVEMIR) 100 UNIT/ML injection Inject 10 Units under the skin into the appropriate area as directed Daily for 90 days. 9 mL 3    Insulin Lispro, 1 Unit Dial, (HUMALOG) 100 UNIT/ML solution pen-injector Inject 1-10 Units under the skin into the appropriate area as directed 3 (Three) Times a Day With Meals.      Insulin Syringe 31G X 5/16\" 1 ML misc 1 each Daily. 100 each 2    Lancets misc 1 each 3 (Three) Times a Day. 100 each 12    loperamide (IMODIUM) 2 MG capsule Take 1 capsule by mouth 4 (Four) Times a Day As Needed for Diarrhea.      LORazepam (ATIVAN) 0.5 MG tablet Take 1 tablet by mouth Daily As Needed.      Nystatin-Diphenhydramine-Hydrocortisone-Lidocaine Take 5 mL by mouth Every 3 (Three) Hours As Needed (Sore throat, trouble swallowing). Swish and swallow 300 mL 2    ondansetron ODT (ZOFRAN-ODT) 4 MG disintegrating tablet Place 1 tablet on the tongue Every 8 (Eight) Hours As Needed for Nausea or Vomiting. 30 tablet 0    oxyCODONE (ROXICODONE) 5 MG/5ML solution Take 5 mL by mouth Every 4 (Four) Hours As Needed for Moderate Pain or Severe Pain. 473 mL 0    pain patient supplied pump by " Intrathecal route Continuous.   Type of Medication: Hydromorphone  and Bupivacaine  Provider:Dr. Boudreaux  Provider number: [phone]  Basal Dose: Hydromorphone 7.518 mg/day Bupivacaine 0.34093 mg/day  ( MAX of Hydromorphone 9.940 mg/ day; Bupivacaine 0.13250 mg /day)  Bolus Dose:Hydromorphone 0.500 mg, Bupivacaine 0.55799 mg  Lockout 3 hours. 1 Bolus per every 3 hours max of 5 bolus per day  Next refill due: July 13 2023  Pump manufacture:[employer]      pantoprazole (PROTONIX) 40 MG EC tablet Take 1 tablet by mouth Daily.      SITagliptin (JANUVIA) 25 MG tablet Daily.      Ventolin  (90 Base) MCG/ACT inhaler inhale TWO puffs BY MOUTH EVERY 4 HOURS AS NEEDED for wheezing 18 g 2       Past Medical History:    Anesthesia    REPORTS HAS GOT REAL EMOTIONAL AND HAS BECOME ANGRY BEFORE    Anxiety    Aortic stenosis, severe    HAS BIO VALVE REPLACED    Arthritis    Asthma    Back pain    Blood clotting disorder    Cancer    Cancer associated pain    HODGKINS LYMPHOMA    CHF (congestive heart failure)    Chronic low back pain with sciatica    Chronic pain disorder    COPD (chronic obstructive pulmonary disease)    Coronary artery disease    Diabetes mellitus    TYPE 2    Diabetes mellitus    Dyspnea on effort    GERD (gastroesophageal reflux disease)    H/O lumpectomy    H/O: hysterectomy    Hiatal hernia    History of degenerative disc disease    History of kidney stones    History of pulmonary embolus (PE)    History of transfusion    AS A CHILD NO TRANSFUSION REACTION    History of transfusion    Hodgkin's lymphoma    5/19/23  5 YEARS SINCE LAST CHEMO TX    Hypertension    Immobility    Liver disease    DENIES ANY CURRENT ISSUES    Lupus    Mitral valve prolapse    Panic disorder    Pelvic pain    Peripheral neuropathy    Personal history of DVT (deep vein thrombosis)    Presence of intrathecal pump    PTSD (post-traumatic stress disorder)    Sacroiliac joint disease    SI (sacroiliac) joint inflammation    Sleep  apnea    Venous insufficiency        Past Surgical History:    ABDOMINAL SURGERY    BACK SURGERY    SPINAL FUSION     BACK SURGERY    BLADDER REPAIR    CARDIAC CATHETERIZATION    Procedure: Valvuloplasty;  Surgeon: Wayne Johnson MD;  Location: [facility] CATH INVASIVE LOCATION;  Service: Cardiology    CARDIAC CATHETERIZATION    CARDIAC CATHETERIZATION    Procedure: Cardiac Catheterization/Vascular Study;  Surgeon: Poncho Reid MD;  Location: [facility] CATH INVASIVE LOCATION;  Service: Cardiovascular;  Laterality: Left;    CARDIAC SURGERY    CARDIAC VALVE REPLACEMENT    CHOLECYSTECTOMY    COLONOSCOPY    Procedure: COLONOSCOPY;  Surgeon: Karine Orlando MD;  Location: [facility] ENDOSCOPY;  Service: Gastroenterology;  Laterality: N/A;  POOR PREP    COLONOSCOPY    Procedure: COLONOSCOPY WITH POLYPECTOMY;  Surgeon: Karine Orlando MD;  Location: [facility] ENDOSCOPY;  Service: Gastroenterology;  Laterality: N/A;  COLON POLYP, HEMORRHOIDS, POOR PREP    COLONOSCOPY    DIRECT LARYNGOSCOPY, ESOPHAGOSCOPY, BRONCHOSCOPY    Procedure: DIRECT LARYNGOSCOPY WITH BIOPSIES , ESOPHAGOSCOPY, BRONCHOSCOPY;  Surgeon: Ronnie Mcgarry MD;  Location: [facility] OR OSC;  Service: ENT;  Laterality: N/A;    ENDOSCOPY    Procedure: ESOPHAGOGASTRODUODENOSCOPY;  Surgeon: Karine Orlando MD;  Location: [facility] ENDOSCOPY;  Service: Gastroenterology;  Laterality: N/A;  ESOPHAGITIS, GASTRITIS, HIATAL HERNIA    ENDOSCOPY    HYSTERECTOMY    LYMPHADENECTOMY    PAIN PUMP INSERTION/REVISION    Procedure: PAIN PUMP INSERTION [facility];  Surgeon: Horace Rios MD;  Location: [facility];  Service: Pain Management    PAIN PUMP INSERTION/REVISION    Procedure: pain pump removal;  Surgeon: Horace Rios MD;  Location: [facility];  Service: Pain Management;  Laterality: N/A;    PORTACATH PLACEMENT    IN LEFT CHEST REPORTS THAT IT IS NOT FUNCTIONING    SKIN BIOPSY    TONSILLECTOMY    TUBAL ABDOMINAL  "LIGATION    TUMOR REMOVAL       Family History:   Family History   Problem Relation Age of Onset    Heart failure Mother     Anxiety disorder Mother     Prostate cancer Father     Depression Sister     Bipolar disorder Sister     Pancreatic cancer Sister     ADD / ADHD Brother     Thyroid cancer Maternal Grandmother     Pancreatic cancer Paternal Grandmother     ADD / ADHD Grandson     ADD / ADHD Granddaughter     ADD / ADHD Nephew     Malig Hyperthermia Neg Hx         Social History:  Social History     Tobacco Use    Smoking status: Every Day     Packs/day: 1.50     Years: 20.00     Pack years: 30.00     Types: Cigarettes     Start date: 1979    Smokeless tobacco: Never   Substance Use Topics    Alcohol use: Never       Objective     Vital Signs:  Resp 16   Ht 167.6 cm (66\")   Wt 65.8 kg (145 lb)   BMI 23.40 kg/m²   Constitutional: sitting in a wheelchair, no acute distress, here alone  Respiratory:  breathing not labored, respiratory effort appears normal  Cardiovascular:  heart regular rate  Musculoskeletal: moving all extremities symmetrically and purposefully  Neurologic:  no obvious motor or sensory deficits,   Psychiatric:  judgment and insight intact    Assessment:  Diagnoses and all orders for this visit:    1. Malignant neoplasm of supraglottis (Primary)  -     Case Request; Standing  -     ceFAZolin (ANCEF) 2,000 mg in sodium chloride 0.9 % 100 mL IVPB  -     Case Request    2. Dysphagia, unspecified type  -     Case Request; Standing  -     ceFAZolin (ANCEF) 2,000 mg in sodium chloride 0.9 % 100 mL IVPB  -     Case Request    Other orders  -     Follow Anesthesia Guidelines / Protocol; Future  -     Obtain Informed Consent; Future  -     Verify NPO; Standing      Plan:  Percutaneous endoscopic gastrostomy tube    Discussion: Indications, options, risks, benefits, and expected outcomes of planned surgery were discussed with the patient and she agrees to proceed.  Patient will continue to hold Plavix " until after PEG tube is placed on July 26.    Kody Mercer MD  07/21/2023    Electronically signed by Kody Mercer MD, 07/21/23, 4:36 PM EDT.

## 2023-07-21 PROBLEM — R13.10 DYSPHAGIA: Status: ACTIVE | Noted: 2023-07-21

## 2023-07-21 PROCEDURE — 77386: CPT | Performed by: RADIOLOGY

## 2023-07-21 PROCEDURE — 77014 CHG CT GUIDANCE RADIATION THERAPY FLDS PLACEMENT: CPT | Performed by: RADIOLOGY

## 2023-07-21 NOTE — TELEPHONE ENCOUNTER
Called [facility] wound care clinic again. This time someone answered and I was transferred to a nurse but they did not answer. The call went back to the person who originally answered and she stated she wrote down the message from when I called earlier and will remind them about the message to call me back.

## 2023-07-21 NOTE — TELEPHONE ENCOUNTER
Luciano Schulte messaged back and said that if she is with home health, they can order, otherwise it would have to be the wound care center. I called [facility] who is in her chart and they stated they were unable to pick pt back up due to her insurance. They don't cover her insurance. I will call the wound care center to seek guidance.

## 2023-07-21 NOTE — TELEPHONE ENCOUNTER
Attempted to call the [facility] wound care clinic. It went to voicemail. I left pt's name, MRN and my direct call back number.

## 2023-07-21 NOTE — TELEPHONE ENCOUNTER
I sent a message to Luciano Schulte about this due to Bailee Gatica being out of the office 07/20 and 07/21. I asked if she is still a CCM pt. I saw an order from January that was placed by Dr. Chelo Villeda with wound care clinic. I asked Luciano if she is on CCM and he can handle it or if I need to send the message to wound care. Her PCP Kiko Luevano is currently on vacation until 08/01/2023.

## 2023-07-21 NOTE — TELEPHONE ENCOUNTER
I spoke with wound care. They stated she needs to be seen for an appt to evaluate her wounds as they need to be measured to send off for supplies. They stated they last saw her in January 2023 and still has an active referral, she may call to schedule an appt. I spoke with patient and let her know she can call wound care to schedule. She stated she has the number. She verbalized understanding.

## 2023-07-24 ENCOUNTER — HOSPITAL ENCOUNTER (OUTPATIENT)
Dept: RADIATION ONCOLOGY | Facility: HOSPITAL | Age: 58
Discharge: HOME | End: 2023-07-24
Payer: COMMERCIAL

## 2023-07-24 ENCOUNTER — DOCUMENTATION (OUTPATIENT)
Dept: RADIATION ONCOLOGY | Facility: HOSPITAL | Age: 58
End: 2023-07-24
Payer: COMMERCIAL

## 2023-07-24 ENCOUNTER — TELEPHONE (OUTPATIENT)
Dept: ONCOLOGY | Facility: HOSPITAL | Age: 58
End: 2023-07-24
Payer: COMMERCIAL

## 2023-07-24 LAB
RAD ONC ARIA COURSE ID: NORMAL
RAD ONC ARIA COURSE INTENT: NORMAL
RAD ONC ARIA COURSE LAST TREATMENT DATE: NORMAL
RAD ONC ARIA COURSE START DATE: NORMAL
RAD ONC ARIA COURSE TREATMENT ELAPSED DAYS: 21
RAD ONC ARIA FIRST TREATMENT DATE: NORMAL
RAD ONC ARIA PLAN FRACTIONS TREATED TO DATE: 12
RAD ONC ARIA PLAN ID: NORMAL
RAD ONC ARIA PLAN PRESCRIBED DOSE PER FRACTION: 2 GY
RAD ONC ARIA PLAN PRIMARY REFERENCE POINT: NORMAL
RAD ONC ARIA PLAN TOTAL FRACTIONS PRESCRIBED: 35
RAD ONC ARIA PLAN TOTAL PRESCRIBED DOSE: 7000 CGY
RAD ONC ARIA REFERENCE POINT DOSAGE GIVEN TO DATE: 24 GY
RAD ONC ARIA REFERENCE POINT ID: NORMAL
RAD ONC ARIA REFERENCE POINT SESSION DOSAGE GIVEN: 2 GY

## 2023-07-24 PROCEDURE — 77014 CHG CT GUIDANCE RADIATION THERAPY FLDS PLACEMENT: CPT | Performed by: RADIOLOGY

## 2023-07-24 PROCEDURE — 77386: CPT | Performed by: RADIOLOGY

## 2023-07-24 NOTE — TELEPHONE ENCOUNTER
OSW received a message that patient will need home health after having a PEG tube placed. OSW contacted patient to discuss but the phone went to voice mail. OSW contacted the clinical nurse at Radiation and passed along the request from Dr. Mercer's office. Radiation clincal RN agreed to place the home health order.

## 2023-07-25 ENCOUNTER — TELEPHONE (OUTPATIENT)
Dept: FAMILY MEDICINE CLINIC | Facility: CLINIC | Age: 58
End: 2023-07-25
Payer: COMMERCIAL

## 2023-07-25 NOTE — TELEPHONE ENCOUNTER
PATIENT IS REQUESTING A NEW ORDER FOR A MECHANICAL WHEEL CHAIR. HER IS NO LONGER REPAIRABLE. SHE WAS IN CONTACT WITH HER INSURANCE AND THEY NEED THE ORDER TO VERIFY THAT IT IS A NECESSITY.     AS WELL AS HER OINTMENT AND SUPPLIES FOR HER LEGS.     PLEASE ADVISE..

## 2023-07-26 ENCOUNTER — DOCUMENTATION (OUTPATIENT)
Dept: NUTRITION | Facility: HOSPITAL | Age: 58
End: 2023-07-26
Payer: COMMERCIAL

## 2023-07-26 ENCOUNTER — HOSPITAL ENCOUNTER (OUTPATIENT)
Facility: HOSPITAL | Age: 58
Setting detail: HOSPITAL OUTPATIENT SURGERY
Discharge: HOME | End: 2023-07-26
Attending: SURGERY | Admitting: SURGERY
Payer: COMMERCIAL

## 2023-07-26 ENCOUNTER — ANESTHESIA (OUTPATIENT)
Dept: GASTROENTEROLOGY | Facility: HOSPITAL | Age: 58
End: 2023-07-26
Payer: COMMERCIAL

## 2023-07-26 ENCOUNTER — ANESTHESIA EVENT (OUTPATIENT)
Dept: GASTROENTEROLOGY | Facility: HOSPITAL | Age: 58
End: 2023-07-26
Payer: COMMERCIAL

## 2023-07-26 VITALS
HEART RATE: 93 BPM | DIASTOLIC BLOOD PRESSURE: 66 MMHG | SYSTOLIC BLOOD PRESSURE: 100 MMHG | HEIGHT: 66 IN | OXYGEN SATURATION: 92 % | TEMPERATURE: 97.6 F | BODY MASS INDEX: 23.74 KG/M2 | WEIGHT: 147.71 LBS | RESPIRATION RATE: 12 BRPM

## 2023-07-26 VITALS — BODY MASS INDEX: 23.66 KG/M2 | WEIGHT: 146.61 LBS

## 2023-07-26 DIAGNOSIS — C32.1 MALIGNANT NEOPLASM OF SUPRAGLOTTIS: ICD-10-CM

## 2023-07-26 DIAGNOSIS — R13.10 DYSPHAGIA, UNSPECIFIED TYPE: ICD-10-CM

## 2023-07-26 DIAGNOSIS — C32.9 LARYNGEAL CANCER: ICD-10-CM

## 2023-07-26 LAB — GLUCOSE BLDC GLUCOMTR-MCNC: 131 MG/DL (ref 70–99)

## 2023-07-26 PROCEDURE — 25010000002 PROPOFOL 10 MG/ML EMULSION: Performed by: NURSE ANESTHETIST, CERTIFIED REGISTERED

## 2023-07-26 PROCEDURE — 25010000002 ONDANSETRON PER 1 MG: Performed by: ANESTHESIOLOGY

## 2023-07-26 PROCEDURE — 82948 REAGENT STRIP/BLOOD GLUCOSE: CPT

## 2023-07-26 PROCEDURE — 25010000002 CEFAZOLIN IN DEXTROSE 2000 MG/ 100 ML SOLUTION: Performed by: SURGERY

## 2023-07-26 PROCEDURE — 77386: CPT | Performed by: RADIOLOGY

## 2023-07-26 PROCEDURE — 77014 CHG CT GUIDANCE RADIATION THERAPY FLDS PLACEMENT: CPT | Performed by: RADIOLOGY

## 2023-07-26 PROCEDURE — 25010000002 MIDAZOLAM PER 1MG: Performed by: ANESTHESIOLOGY

## 2023-07-26 RX ORDER — CEFAZOLIN SODIUM 2 G/100ML
2000 INJECTION, SOLUTION INTRAVENOUS
Status: COMPLETED | OUTPATIENT
Start: 2023-07-26 | End: 2023-07-26

## 2023-07-26 RX ORDER — LIDOCAINE HYDROCHLORIDE 20 MG/ML
INJECTION, SOLUTION EPIDURAL; INFILTRATION; INTRACAUDAL; PERINEURAL AS NEEDED
Status: DISCONTINUED | OUTPATIENT
Start: 2023-07-26 | End: 2023-07-26 | Stop reason: SURG

## 2023-07-26 RX ORDER — IPRATROPIUM BROMIDE AND ALBUTEROL SULFATE 2.5; .5 MG/3ML; MG/3ML
3 SOLUTION RESPIRATORY (INHALATION) ONCE
Status: COMPLETED | OUTPATIENT
Start: 2023-07-26 | End: 2023-07-26

## 2023-07-26 RX ORDER — MIDAZOLAM HYDROCHLORIDE 2 MG/2ML
2 INJECTION, SOLUTION INTRAMUSCULAR; INTRAVENOUS
Status: DISCONTINUED | OUTPATIENT
Start: 2023-07-26 | End: 2023-07-26 | Stop reason: HOSPADM

## 2023-07-26 RX ORDER — CEFAZOLIN SODIUM 2 G/100ML
2000 INJECTION, SOLUTION INTRAVENOUS
Status: DISCONTINUED | OUTPATIENT
Start: 2023-07-26 | End: 2023-07-26 | Stop reason: SDUPTHER

## 2023-07-26 RX ORDER — PHENYLEPHRINE HCL IN 0.9% NACL 1 MG/10 ML
SYRINGE (ML) INTRAVENOUS AS NEEDED
Status: DISCONTINUED | OUTPATIENT
Start: 2023-07-26 | End: 2023-07-26 | Stop reason: SURG

## 2023-07-26 RX ORDER — SODIUM CHLORIDE, SODIUM LACTATE, POTASSIUM CHLORIDE, CALCIUM CHLORIDE 600; 310; 30; 20 MG/100ML; MG/100ML; MG/100ML; MG/100ML
30 INJECTION, SOLUTION INTRAVENOUS CONTINUOUS
Status: DISCONTINUED | OUTPATIENT
Start: 2023-07-26 | End: 2023-07-26 | Stop reason: HOSPADM

## 2023-07-26 RX ORDER — PROPOFOL 10 MG/ML
VIAL (ML) INTRAVENOUS AS NEEDED
Status: DISCONTINUED | OUTPATIENT
Start: 2023-07-26 | End: 2023-07-26 | Stop reason: SURG

## 2023-07-26 RX ORDER — ONDANSETRON 2 MG/ML
4 INJECTION INTRAMUSCULAR; INTRAVENOUS ONCE
Status: COMPLETED | OUTPATIENT
Start: 2023-07-26 | End: 2023-07-26

## 2023-07-26 RX ADMIN — PROPOFOL 30 MG: 10 INJECTION, EMULSION INTRAVENOUS at 15:21

## 2023-07-26 RX ADMIN — ONDANSETRON 4 MG: 2 INJECTION INTRAMUSCULAR; INTRAVENOUS at 16:04

## 2023-07-26 RX ADMIN — SODIUM CHLORIDE, POTASSIUM CHLORIDE, SODIUM LACTATE AND CALCIUM CHLORIDE 30 ML/HR: 600; 310; 30; 20 INJECTION, SOLUTION INTRAVENOUS at 13:55

## 2023-07-26 RX ADMIN — PROPOFOL 30 MG: 10 INJECTION, EMULSION INTRAVENOUS at 15:22

## 2023-07-26 RX ADMIN — LIDOCAINE HYDROCHLORIDE 50 MG: 20 INJECTION, SOLUTION EPIDURAL; INFILTRATION; INTRACAUDAL; PERINEURAL at 15:19

## 2023-07-26 RX ADMIN — IPRATROPIUM BROMIDE AND ALBUTEROL SULFATE 3 ML: .5; 3 SOLUTION RESPIRATORY (INHALATION) at 14:01

## 2023-07-26 RX ADMIN — PROPOFOL 125 MCG/KG/MIN: 10 INJECTION, EMULSION INTRAVENOUS at 15:20

## 2023-07-26 RX ADMIN — CEFAZOLIN SODIUM 2000 MG: 2 INJECTION, SOLUTION INTRAVENOUS at 15:15

## 2023-07-26 RX ADMIN — MIDAZOLAM HYDROCHLORIDE 2 MG: 1 INJECTION, SOLUTION INTRAMUSCULAR; INTRAVENOUS at 14:01

## 2023-07-26 RX ADMIN — PROPOFOL 30 MG: 10 INJECTION, EMULSION INTRAVENOUS at 15:19

## 2023-07-26 RX ADMIN — Medication 150 MCG: at 15:25

## 2023-07-26 NOTE — ANESTHESIA POSTPROCEDURE EVALUATION
Patient: Isha Llanes    Procedure Summary       Date: 07/26/23 Room / Location: [facility] ENDOSCOPY 4 / [facility] ENDOSCOPY    Anesthesia Start: 1515 Anesthesia Stop:     Procedure: PERCUTANEOUS ENDOSCOPIC GASTROSTOMY TUBE INSERTION Diagnosis:       Malignant neoplasm of supraglottis      Dysphagia, unspecified type      (Malignant neoplasm of supraglottis [C32.1])      (Dysphagia, unspecified type [R13.10])    Surgeons: Kody Mercer MD Provider: Ayden Nelson CRNA    Anesthesia Type: general, MAC ASA Status: 4            Anesthesia Type: general, MAC    Vitals  Vitals Value Taken Time   BP 93/63 07/26/23 1555   Temp     Pulse 95 07/26/23 1558   Resp     SpO2 91 % 07/26/23 1558   Vitals shown include unvalidated device data.        Post Anesthesia Care and Evaluation    Patient location during evaluation: bedside  Patient participation: complete - patient participated  Level of consciousness: awake  Pain management: adequate    Airway patency: patent  PONV Status: none  Cardiovascular status: acceptable  Respiratory status: acceptable  Hydration status: acceptable    Comments: An Anesthesiologist personally participated in the most demanding procedures (including induction and emergence if applicable) in the anesthesia plan, monitored the course of anesthesia administration at frequent intervals and remained physically present and available for immediate diagnosis and treatment of emergencies.

## 2023-07-26 NOTE — DISCHARGE INSTRUCTIONS
"  Dr. Mercer's Instructions       DIET  Resume your normal diet.       HOME MEDICATIONS  Resume all of your normal home medications.       PAIN CONTROL    You will receive a narcotic pain medicine prescription.   Do not drive while you are taking the prescription pain medication.         FEEDING TUBE  The tube can be used for tube feeding and medicines beginning tomorrow morning.   You can flush the tube with water beginning now.  The tube should be flushed with about 60 ml of water every 12 hours and also just before and after each time the tube is used to give medicines or tube feeds.  Sit upright or at least a 45 degree angle during tube feeding and for one hour following the end of the feeding.     You can remove the dressing and take showers 48 hours after the tube was placed.   To remove drainage, crusts, or blood from the skin around the tube, use a solution of half hydrogen peroxide and half water.  Clean underneath the skin disk with cotton tip applicator.  After you use the hydrogen peroxide-half water solution, clean the area with antibacterial soap and water.  Cleanse in a spiral pattern, beginning at the tube and moving outward.  Rinse & pat dry.  Some drainage around the site is normal.  It is important to keep your skin around the tube clean and dry.     THE TOP OF THE SKIN DISK ON THE TUBE SHOULD REMAIN AT ABOUT THE 4.5 to 5 CM TD.  CHECK THE TUBE EACH DAY TO MAKE SURE THE TOP OF THE SKIN DISK REMAINS AT ABOUT THE 4.5 to 5 CM TD.    Gentle manipulations of tube are not harmful, but take care not to push or pull the tube out of position.       Avoid a dressing in most cases as dressings may promote skin breakdown and infection.  If a dressing is used, change it immediately if it becomes wet.  Secure the tube underneath your clothing with tape or with \"stretch netting\" to prevent movement.  Excess tension where the tube exits the skin can cause damage to the stomach lining or enlarge the opening in " the stomach.  Make sure the tube does not lay flat against the skin all the time.       FOLLOW-UP VISIT & QUESTIONS/CONCERNS  The hospital staff will make arrangements for a home health nurse to visit you to provide further education about how to take care of & use the feeding tube.  Call Dr. Mercer´s office at [phone] and schedule a follow-up appointment for about 2 weeks after your surgery date.  However, if you are doing fine & don´t have any concerns then simply cancel the follow-up appointment.  The appointment is not mandatory.  Should you have any questions or concerns, have a temperature over 101 degrees, have increasing redness or swelling at the site of the incision or feeding tube, or have any other problems you think need medical attention, please call Dr. Mercer´s office or go to the emergency room.  Contact Dr. Mercer immediately or go to the emergency room if your tube falls out.   The tube must be replaced within a few hours or the opening leading to your stomach may close.  If the tube does fall out, bring it with you to the emergency room or the doctor's office and cover the skin opening with a dry gauze or cloth.

## 2023-07-26 NOTE — PROGRESS NOTES
"Outpatient Nutrition Oncology Follow Up    Patient Name: Isha Llanes  YOB: 1965  MRN: [id number]    Recommendation(s):   Once PEG tube placed, suggest 5 total cans of Boost VHC given via gravity administration + 190 mL of water flush given 6 times daily.  PO feeds as tolerated (recreational feeds).      Reason for Consult: Radiation tx f/u (PEG placement scheduled for today 7/26/23)       Today's Weight:  66.5 kg    Weight Change:  1# gain in the past week. Overall 5.2% wt decline in the past month    Nutrition-related concerns: Anorexia, Early Satiety, Dysphagia/odynophagia, Taste Alterations, and Xerostomia    Current PO intake:  Minimal PO intake.  Having difficult with most food consistencies and also trying to drink the Boost VHC (says it tastes like cardboard)     Current Nutrition Supplement intake: drinking the Boost VHC PO at this time.  Provided samples of Ensure Complete and Ensure Plus today as well.    Current EN RX:  Pt set up with vendor [employer] [place] [employer].  She received her EN supplies & formula ([employer] Boost VHC).  She is trying to drink the shake for now, until the tube can be placed.      Consult or Intervention:  Pt scheduled to have PEG placement today 7/26/23.  She reports all foods & beverages taste like \"cardboard\".  She has not been using the homemade mouth rinse 2' cannot find baking soda anywhere.  Encouraged this as an oral rinse to help with altered taste.  Pt requested to try other flavors of Ensure Plus and Ensure Complete- samples provided.  Provided pt with written instruction on how many VHC cans and how much water flush to administer after PEG is placed.  Encouraged to consume ONS (any form, high kcal / high protein) at least 4 times today.  Radiation oncology nursing staff aware that pt needs teaching on EN feeds- to be given tomorrow.    Nutrition Diagnosis: Moderate malnutrition related to decreased ability to consume sufficient energy as evidenced by " physiological causes increasing nutrient needs., hypermetabolic state., inadequate energy intake., and patient report.    Plan: Will follow up per oncology nutrition protocol

## 2023-07-26 NOTE — ANESTHESIA PREPROCEDURE EVALUATION
Anesthesia Evaluation     Patient summary reviewed and Nursing notes reviewed   no history of anesthetic complications:   NPO Solid Status: > 8 hours  NPO Liquid Status: > 2 hours           Airway   Mallampati: II  TM distance: >3 FB  Neck ROM: full  No difficulty expected  Dental    (+) edentulous    Pulmonary    (+) pulmonary embolism, COPD, asthma,shortness of breath, sleep apneaWheezes: mild, expiratory.  Cardiovascular - normal exam  Exercise tolerance: poor (<4 METS)    PT is on anticoagulation therapy  Rhythm: regular  Rate: normal    (+) hypertension, valvular problems/murmurs MVP and AS, past MI , CADCHF , PVD      Neuro/Psych  (+) numbness, psychiatric history Anxiety, Depression and PTSD  GI/Hepatic/Renal/Endo    (+) hiatal hernia, GERD, liver disease fatty liver disease, diabetes mellitus    Musculoskeletal     (+) back pain, neck pain  Abdominal    Substance History - negative use     OB/GYN negative ob/gyn ROS         Other   arthritis,   history of cancer (Laryngeal, HL)    ROS/Med Hx Other: PAT Nursing Notes unavailable.                   Anesthesia Plan    ASA 4     general and MAC     (Patient understands anesthesia not responsible for dental damage.)  intravenous induction     Anesthetic plan, risks, benefits, and alternatives have been provided, discussed and informed consent has been obtained with: patient.    Use of blood products discussed with patient .    Plan discussed with CRNA.    CODE STATUS:

## 2023-07-27 ENCOUNTER — HOSPITAL ENCOUNTER (OUTPATIENT)
Dept: RADIATION ONCOLOGY | Facility: HOSPITAL | Age: 58
Discharge: HOME | End: 2023-07-27
Payer: COMMERCIAL

## 2023-07-27 ENCOUNTER — TELEPHONE (OUTPATIENT)
Dept: ONCOLOGY | Facility: HOSPITAL | Age: 58
End: 2023-07-27
Payer: COMMERCIAL

## 2023-07-27 ENCOUNTER — TELEPHONE (OUTPATIENT)
Dept: SURGERY | Facility: CLINIC | Age: 58
End: 2023-07-27
Payer: COMMERCIAL

## 2023-07-27 DIAGNOSIS — C32.9 LARYNGEAL CANCER: Primary | ICD-10-CM

## 2023-07-27 LAB
RAD ONC ARIA COURSE ID: NORMAL
RAD ONC ARIA COURSE INTENT: NORMAL
RAD ONC ARIA COURSE LAST TREATMENT DATE: NORMAL
RAD ONC ARIA COURSE START DATE: NORMAL
RAD ONC ARIA COURSE TREATMENT ELAPSED DAYS: 23
RAD ONC ARIA FIRST TREATMENT DATE: NORMAL
RAD ONC ARIA PLAN FRACTIONS TREATED TO DATE: 13
RAD ONC ARIA PLAN ID: NORMAL
RAD ONC ARIA PLAN PRESCRIBED DOSE PER FRACTION: 2 GY
RAD ONC ARIA PLAN PRIMARY REFERENCE POINT: NORMAL
RAD ONC ARIA PLAN TOTAL FRACTIONS PRESCRIBED: 35
RAD ONC ARIA PLAN TOTAL PRESCRIBED DOSE: 7000 CGY
RAD ONC ARIA REFERENCE POINT DOSAGE GIVEN TO DATE: 26 GY
RAD ONC ARIA REFERENCE POINT ID: NORMAL
RAD ONC ARIA REFERENCE POINT SESSION DOSAGE GIVEN: 2 GY

## 2023-07-27 RX ORDER — HYDROCODONE BITARTRATE AND ACETAMINOPHEN 5; 325 MG/1; MG/1
1 TABLET ORAL EVERY 6 HOURS PRN
Qty: 8 TABLET | Refills: 0 | Status: SHIPPED | OUTPATIENT
Start: 2023-07-27

## 2023-07-27 NOTE — TELEPHONE ENCOUNTER
This nurse called the pt but no one answered. This nurse left a detailed and lengthy vm instructing the pt on who she needs to call for which needs that she has.     Prophylactic Placement    - Did you receive education and teaching from the post-op staff  -  - Were you made aware you need to lush your PEG tube with 60ml BID while not in use?  -   - Did you receive an irrigation tray/kit for these water flushes?  -  - Do you feel comfortable and confident in providing your PEG tube care at this time?  -  - Do you need additional education and support arranged through home health?  -  - if yes, do you have a preferred home health agency?      Immediate Use Placement       - Did you receive education and teaching from the post-op staff?   -  - Did you receive an irrigation tray/kit for these water flushes?  -  - Has home health been in contact with you to provide additional education and support?  -  - Has the Oncology RD been in contact with you to discuss enteral nutrition recommendations?  -  - Has the nutrition vendor been in contact with you to deliver enteral nutrition and supplies?  -

## 2023-07-27 NOTE — TELEPHONE ENCOUNTER
I CALLED DALIA AND TOLD HER THAT DR. RAE SENT A NEW SCRIPT.    SHE ASKED IF WILL REQUIRE A PA.  I TOLD HER THAT I DO NOT KNOW, AND SHE WILL HAVE TO LET US KNOW.

## 2023-07-27 NOTE — TELEPHONE ENCOUNTER
PATIENT GAVE HUB AND ME VERBAL PERMISSION TO SPEAK TO HER DAUGHTER, DALIA DILLON.      PATIENT HAD SURGERY 07/26/23.  THE PAIN MEDICATION SHE WAS PRESCRIBED REQUIRES A PA, AND APOTHECARE #3 ONLY HAS 70 ML AND THE PRESCRIPTION IS WRITTEN  ML.  DALIA SAID IT IS THE LIQUID HYDROCODONE.    SHE SAID IF NECESSARY TO DO PILL FORM, SHE CAN CRUSH THEM FOR HER MOM.      PATIENT HAS BEEN UP ALL NIGHT IN PAIN.    PLEASE CALL DALIA [phone].

## 2023-07-27 NOTE — TELEPHONE ENCOUNTER
I will do PA if necessary.   Waiting on a call to see if I need to. If I do not hear anything back I assume pt got her medication.

## 2023-07-28 ENCOUNTER — HOSPITAL ENCOUNTER (OUTPATIENT)
Dept: RADIATION ONCOLOGY | Facility: HOSPITAL | Age: 58
Discharge: HOME | End: 2023-07-28
Payer: COMMERCIAL

## 2023-07-28 ENCOUNTER — DOCUMENTATION (OUTPATIENT)
Dept: RADIATION ONCOLOGY | Facility: HOSPITAL | Age: 58
End: 2023-07-28
Payer: COMMERCIAL

## 2023-07-28 ENCOUNTER — TELEPHONE (OUTPATIENT)
Dept: SURGERY | Facility: CLINIC | Age: 58
End: 2023-07-28
Payer: COMMERCIAL

## 2023-07-28 DIAGNOSIS — C32.9 LARYNGEAL CANCER: Primary | ICD-10-CM

## 2023-07-28 LAB
RAD ONC ARIA COURSE ID: NORMAL
RAD ONC ARIA COURSE INTENT: NORMAL
RAD ONC ARIA COURSE LAST TREATMENT DATE: NORMAL
RAD ONC ARIA COURSE START DATE: NORMAL
RAD ONC ARIA COURSE TREATMENT ELAPSED DAYS: 25
RAD ONC ARIA FIRST TREATMENT DATE: NORMAL
RAD ONC ARIA PLAN FRACTIONS TREATED TO DATE: 14
RAD ONC ARIA PLAN ID: NORMAL
RAD ONC ARIA PLAN PRESCRIBED DOSE PER FRACTION: 2 GY
RAD ONC ARIA PLAN PRIMARY REFERENCE POINT: NORMAL
RAD ONC ARIA PLAN TOTAL FRACTIONS PRESCRIBED: 35
RAD ONC ARIA PLAN TOTAL PRESCRIBED DOSE: 7000 CGY
RAD ONC ARIA REFERENCE POINT DOSAGE GIVEN TO DATE: 28 GY
RAD ONC ARIA REFERENCE POINT ID: NORMAL
RAD ONC ARIA REFERENCE POINT SESSION DOSAGE GIVEN: 2 GY

## 2023-07-28 PROCEDURE — 77014 CHG CT GUIDANCE RADIATION THERAPY FLDS PLACEMENT: CPT | Performed by: RADIOLOGY

## 2023-07-28 PROCEDURE — 77386: CPT | Performed by: RADIOLOGY

## 2023-07-28 RX ORDER — HYDROCODONE BITARTRATE AND ACETAMINOPHEN 5; 325 MG/1; MG/1
1 TABLET ORAL EVERY 6 HOURS PRN
Qty: 8 TABLET | Refills: 0 | Status: SHIPPED | OUTPATIENT
Start: 2023-07-28

## 2023-07-28 NOTE — TELEPHONE ENCOUNTER
Prescription sent.  Tell patient I cannot send any more pain medications for the PEG tube placement after this one.

## 2023-07-28 NOTE — PROGRESS NOTES
Diagnosis: Laryngeal Cancer    Reason for Referral: Patient and her daughter requested to meet with OSW to discuss in home care options    Content of Visit: OSW met with patient and her daughter. Patient is being referred for home health however patient's daughter would like the care giver and meals through [facility]. OSW provided the eligibility criteria which requires age 60 to be eligible. OSW educated the patient and daughter on HOME and COMMUNITY BASED Waiver. OSW provided the contact information. Patient's daughter stated she intercepted a call from [phone] and was able to access a used wheelchair for the patient. Patient's daughter was hoping for more resources but OSW educated her that those services are very limited and age not diagnosis is the basis for criteria. Patient's daughter stated she needed advice on nutritional needs to be addressed stated patient has not ate since peg tube placed. OSW explained the nurse would be meeting with them next to provided feeding tube education.     Resources/Referrals Provided: [facility] and applying for Home and Community Based Waiver.

## 2023-07-28 NOTE — PROGRESS NOTES
Peg tube education given to daughter today.  Demonstrated on how to flush, how to give gravity feeds and how to perform daily site care.  Multiple handouts given to daughter.  Unable to educate patient due to her inability to stay awake during education.  Daughter states that she will be the one providing the care.  After thorough education given, patients daughter denies any questions or concerns at this time.  Instructed daughter to contact office for any questions or concerns.  Instructed that if they had any needs over the weekend to either go to urgent care or ER.  She verbalized understanding.

## 2023-07-28 NOTE — TELEPHONE ENCOUNTER
Patient called stating she needs something for pain. She states she could not get the liquid because it needs a PA but she is able to take the pills. She states she is having a lot of pain in the peg tube area.

## 2023-07-31 PROCEDURE — 77336 RADIATION PHYSICS CONSULT: CPT | Performed by: RADIOLOGY

## 2023-07-31 PROCEDURE — 77386: CPT | Performed by: RADIOLOGY

## 2023-08-01 ENCOUNTER — HOSPITAL ENCOUNTER (OUTPATIENT)
Dept: RADIATION ONCOLOGY | Facility: HOSPITAL | Age: 58
Discharge: HOME | End: 2023-08-01
Payer: COMMERCIAL

## 2023-08-01 ENCOUNTER — HOSPITAL ENCOUNTER (OUTPATIENT)
Dept: RADIATION ONCOLOGY | Facility: HOSPITAL | Age: 58
Setting detail: RADIATION/ONCOLOGY SERIES
End: 2023-08-01
Payer: COMMERCIAL

## 2023-08-01 ENCOUNTER — HOSPITAL ENCOUNTER (OUTPATIENT)
Dept: RADIATION ONCOLOGY | Facility: HOSPITAL | Age: 58
Discharge: HOME | End: 2023-08-01

## 2023-08-01 VITALS
DIASTOLIC BLOOD PRESSURE: 80 MMHG | BODY MASS INDEX: 23.01 KG/M2 | HEART RATE: 89 BPM | RESPIRATION RATE: 16 BRPM | TEMPERATURE: 98.5 F | SYSTOLIC BLOOD PRESSURE: 108 MMHG | OXYGEN SATURATION: 97 % | WEIGHT: 142.64 LBS

## 2023-08-01 DIAGNOSIS — C32.1 SQUAMOUS CELL CARCINOMA OF EPIGLOTTIS: Primary | ICD-10-CM

## 2023-08-01 LAB
ALBUMIN SERPL-MCNC: 3.9 G/DL (ref 3.5–5.2)
ALBUMIN/GLOB SERPL: 1.2 G/DL
ALP SERPL-CCNC: 68 U/L (ref 39–117)
ALT SERPL W P-5'-P-CCNC: 7 U/L (ref 1–33)
ANION GAP SERPL CALCULATED.3IONS-SCNC: 8.5 MMOL/L (ref 5–15)
AST SERPL-CCNC: 12 U/L (ref 1–32)
BASOPHILS # BLD AUTO: 0 10*3/MM3 (ref 0–0.2)
BASOPHILS NFR BLD AUTO: 0 % (ref 0–1.5)
BILIRUB SERPL-MCNC: 0.2 MG/DL (ref 0–1.2)
BUN SERPL-MCNC: 10 MG/DL (ref 6–20)
BUN/CREAT SERPL: 22.2 (ref 7–25)
CALCIUM SPEC-SCNC: 9.2 MG/DL (ref 8.6–10.5)
CHLORIDE SERPL-SCNC: 102 MMOL/L (ref 98–107)
CO2 SERPL-SCNC: 29.5 MMOL/L (ref 22–29)
CREAT SERPL-MCNC: 0.45 MG/DL (ref 0.57–1)
DEPRECATED RDW RBC AUTO: 43.5 FL (ref 37–54)
EGFRCR SERPLBLD CKD-EPI 2021: 112.4 ML/MIN/1.73
EOSINOPHIL # BLD AUTO: 0.08 10*3/MM3 (ref 0–0.4)
EOSINOPHIL NFR BLD AUTO: 2.2 % (ref 0.3–6.2)
ERYTHROCYTE [DISTWIDTH] IN BLOOD BY AUTOMATED COUNT: 14.5 % (ref 12.3–15.4)
GLOBULIN UR ELPH-MCNC: 3.3 GM/DL
GLUCOSE SERPL-MCNC: 135 MG/DL (ref 65–99)
HCT VFR BLD AUTO: 39.9 % (ref 34–46.6)
HGB BLD-MCNC: 12.6 G/DL (ref 12–15.9)
IMM GRANULOCYTES # BLD AUTO: 0 10*3/MM3 (ref 0–0.05)
IMM GRANULOCYTES NFR BLD AUTO: 0 % (ref 0–0.5)
LYMPHOCYTES # BLD AUTO: 0.39 10*3/MM3 (ref 0.7–3.1)
LYMPHOCYTES NFR BLD AUTO: 10.7 % (ref 19.6–45.3)
MCH RBC QN AUTO: 26.3 PG (ref 26.6–33)
MCHC RBC AUTO-ENTMCNC: 31.6 G/DL (ref 31.5–35.7)
MCV RBC AUTO: 83.1 FL (ref 79–97)
MONOCYTES # BLD AUTO: 0.31 10*3/MM3 (ref 0.1–0.9)
MONOCYTES NFR BLD AUTO: 8.5 % (ref 5–12)
NEUTROPHILS NFR BLD AUTO: 2.87 10*3/MM3 (ref 1.7–7)
NEUTROPHILS NFR BLD AUTO: 78.6 % (ref 42.7–76)
NRBC BLD AUTO-RTO: 0 /100 WBC (ref 0–0.2)
PLATELET # BLD AUTO: 158 10*3/MM3 (ref 140–450)
PMV BLD AUTO: 11.6 FL (ref 6–12)
POTASSIUM SERPL-SCNC: 4 MMOL/L (ref 3.5–5.2)
PROT SERPL-MCNC: 7.2 G/DL (ref 6–8.5)
RAD ONC ARIA COURSE ID: NORMAL
RAD ONC ARIA COURSE ID: NORMAL
RAD ONC ARIA COURSE INTENT: NORMAL
RAD ONC ARIA COURSE INTENT: NORMAL
RAD ONC ARIA COURSE LAST TREATMENT DATE: NORMAL
RAD ONC ARIA COURSE LAST TREATMENT DATE: NORMAL
RAD ONC ARIA COURSE START DATE: NORMAL
RAD ONC ARIA COURSE START DATE: NORMAL
RAD ONC ARIA COURSE TREATMENT ELAPSED DAYS: 28
RAD ONC ARIA COURSE TREATMENT ELAPSED DAYS: 29
RAD ONC ARIA FIRST TREATMENT DATE: NORMAL
RAD ONC ARIA FIRST TREATMENT DATE: NORMAL
RAD ONC ARIA PLAN FRACTIONS TREATED TO DATE: 15
RAD ONC ARIA PLAN FRACTIONS TREATED TO DATE: 16
RAD ONC ARIA PLAN ID: NORMAL
RAD ONC ARIA PLAN ID: NORMAL
RAD ONC ARIA PLAN PRESCRIBED DOSE PER FRACTION: 2 GY
RAD ONC ARIA PLAN PRESCRIBED DOSE PER FRACTION: 2 GY
RAD ONC ARIA PLAN PRIMARY REFERENCE POINT: NORMAL
RAD ONC ARIA PLAN PRIMARY REFERENCE POINT: NORMAL
RAD ONC ARIA PLAN TOTAL FRACTIONS PRESCRIBED: 35
RAD ONC ARIA PLAN TOTAL FRACTIONS PRESCRIBED: 35
RAD ONC ARIA PLAN TOTAL PRESCRIBED DOSE: 7000 CGY
RAD ONC ARIA PLAN TOTAL PRESCRIBED DOSE: 7000 CGY
RAD ONC ARIA REFERENCE POINT DOSAGE GIVEN TO DATE: 30 GY
RAD ONC ARIA REFERENCE POINT DOSAGE GIVEN TO DATE: 32 GY
RAD ONC ARIA REFERENCE POINT ID: NORMAL
RAD ONC ARIA REFERENCE POINT ID: NORMAL
RAD ONC ARIA REFERENCE POINT SESSION DOSAGE GIVEN: 2 GY
RAD ONC ARIA REFERENCE POINT SESSION DOSAGE GIVEN: 2 GY
RBC # BLD AUTO: 4.8 10*6/MM3 (ref 3.77–5.28)
SODIUM SERPL-SCNC: 140 MMOL/L (ref 136–145)
WBC NRBC COR # BLD: 3.65 10*3/MM3 (ref 3.4–10.8)

## 2023-08-01 PROCEDURE — 85025 COMPLETE CBC W/AUTO DIFF WBC: CPT | Performed by: RADIOLOGY

## 2023-08-01 PROCEDURE — 80053 COMPREHEN METABOLIC PANEL: CPT | Performed by: RADIOLOGY

## 2023-08-01 PROCEDURE — 77386: CPT | Performed by: RADIOLOGY

## 2023-08-01 PROCEDURE — 77427 RADIATION TX MANAGEMENT X5: CPT | Performed by: RADIOLOGY

## 2023-08-01 PROCEDURE — 77014 CHG CT GUIDANCE RADIATION THERAPY FLDS PLACEMENT: CPT | Performed by: RADIOLOGY

## 2023-08-02 ENCOUNTER — HOSPITAL ENCOUNTER (OUTPATIENT)
Dept: RADIATION ONCOLOGY | Facility: HOSPITAL | Age: 58
Discharge: HOME | End: 2023-08-02
Payer: COMMERCIAL

## 2023-08-02 VITALS — WEIGHT: 141.98 LBS | BODY MASS INDEX: 22.9 KG/M2

## 2023-08-02 DIAGNOSIS — C32.9 LARYNGEAL CANCER: Primary | ICD-10-CM

## 2023-08-02 LAB
RAD ONC ARIA COURSE ID: NORMAL
RAD ONC ARIA COURSE INTENT: NORMAL
RAD ONC ARIA COURSE LAST TREATMENT DATE: NORMAL
RAD ONC ARIA COURSE START DATE: NORMAL
RAD ONC ARIA COURSE TREATMENT ELAPSED DAYS: 30
RAD ONC ARIA FIRST TREATMENT DATE: NORMAL
RAD ONC ARIA PLAN FRACTIONS TREATED TO DATE: 17
RAD ONC ARIA PLAN ID: NORMAL
RAD ONC ARIA PLAN PRESCRIBED DOSE PER FRACTION: 2 GY
RAD ONC ARIA PLAN PRIMARY REFERENCE POINT: NORMAL
RAD ONC ARIA PLAN TOTAL FRACTIONS PRESCRIBED: 35
RAD ONC ARIA PLAN TOTAL PRESCRIBED DOSE: 7000 CGY
RAD ONC ARIA REFERENCE POINT DOSAGE GIVEN TO DATE: 34 GY
RAD ONC ARIA REFERENCE POINT ID: NORMAL
RAD ONC ARIA REFERENCE POINT SESSION DOSAGE GIVEN: 2 GY

## 2023-08-02 PROCEDURE — 77386: CPT | Performed by: RADIOLOGY

## 2023-08-02 PROCEDURE — 77014 CHG CT GUIDANCE RADIATION THERAPY FLDS PLACEMENT: CPT | Performed by: RADIOLOGY

## 2023-08-02 RX ORDER — HYDROCODONE BITARTRATE AND ACETAMINOPHEN 5; 325 MG/1; MG/1
1 TABLET ORAL EVERY 6 HOURS PRN
Qty: 90 TABLET | Refills: 0 | Status: SHIPPED | OUTPATIENT
Start: 2023-08-02

## 2023-08-03 ENCOUNTER — TELEPHONE (OUTPATIENT)
Dept: SURGERY | Facility: CLINIC | Age: 58
End: 2023-08-03
Payer: COMMERCIAL

## 2023-08-04 ENCOUNTER — DOCUMENTATION (OUTPATIENT)
Dept: RADIATION ONCOLOGY | Facility: HOSPITAL | Age: 58
End: 2023-08-04
Payer: COMMERCIAL

## 2023-08-04 ENCOUNTER — HOSPITAL ENCOUNTER (OUTPATIENT)
Dept: RADIATION ONCOLOGY | Facility: HOSPITAL | Age: 58
Discharge: HOME | End: 2023-08-04
Payer: COMMERCIAL

## 2023-08-04 LAB
RAD ONC ARIA COURSE ID: NORMAL
RAD ONC ARIA COURSE INTENT: NORMAL
RAD ONC ARIA COURSE LAST TREATMENT DATE: NORMAL
RAD ONC ARIA COURSE START DATE: NORMAL
RAD ONC ARIA COURSE TREATMENT ELAPSED DAYS: 32
RAD ONC ARIA FIRST TREATMENT DATE: NORMAL
RAD ONC ARIA PLAN FRACTIONS TREATED TO DATE: 18
RAD ONC ARIA PLAN ID: NORMAL
RAD ONC ARIA PLAN PRESCRIBED DOSE PER FRACTION: 2 GY
RAD ONC ARIA PLAN PRIMARY REFERENCE POINT: NORMAL
RAD ONC ARIA PLAN TOTAL FRACTIONS PRESCRIBED: 35
RAD ONC ARIA PLAN TOTAL PRESCRIBED DOSE: 7000 CGY
RAD ONC ARIA REFERENCE POINT DOSAGE GIVEN TO DATE: 36 GY
RAD ONC ARIA REFERENCE POINT ID: NORMAL
RAD ONC ARIA REFERENCE POINT SESSION DOSAGE GIVEN: 2 GY

## 2023-08-04 PROCEDURE — 77386: CPT | Performed by: RADIOLOGY

## 2023-08-04 PROCEDURE — 77014 CHG CT GUIDANCE RADIATION THERAPY FLDS PLACEMENT: CPT | Performed by: RADIOLOGY

## 2023-08-07 ENCOUNTER — HOSPITAL ENCOUNTER (OUTPATIENT)
Dept: RADIATION ONCOLOGY | Facility: HOSPITAL | Age: 58
Discharge: HOME | End: 2023-08-07
Payer: COMMERCIAL

## 2023-08-07 DIAGNOSIS — F51.01 PRIMARY INSOMNIA: ICD-10-CM

## 2023-08-07 DIAGNOSIS — F41.9 ANXIETY: Primary | ICD-10-CM

## 2023-08-07 LAB
RAD ONC ARIA COURSE ID: NORMAL
RAD ONC ARIA COURSE INTENT: NORMAL
RAD ONC ARIA COURSE LAST TREATMENT DATE: NORMAL
RAD ONC ARIA COURSE START DATE: NORMAL
RAD ONC ARIA COURSE TREATMENT ELAPSED DAYS: 35
RAD ONC ARIA FIRST TREATMENT DATE: NORMAL
RAD ONC ARIA PLAN FRACTIONS TREATED TO DATE: 19
RAD ONC ARIA PLAN ID: NORMAL
RAD ONC ARIA PLAN PRESCRIBED DOSE PER FRACTION: 2 GY
RAD ONC ARIA PLAN PRIMARY REFERENCE POINT: NORMAL
RAD ONC ARIA PLAN TOTAL FRACTIONS PRESCRIBED: 35
RAD ONC ARIA PLAN TOTAL PRESCRIBED DOSE: 7000 CGY
RAD ONC ARIA REFERENCE POINT DOSAGE GIVEN TO DATE: 38 GY
RAD ONC ARIA REFERENCE POINT ID: NORMAL
RAD ONC ARIA REFERENCE POINT SESSION DOSAGE GIVEN: 2 GY

## 2023-08-07 PROCEDURE — 77386: CPT | Performed by: RADIOLOGY

## 2023-08-07 PROCEDURE — 77014 CHG CT GUIDANCE RADIATION THERAPY FLDS PLACEMENT: CPT | Performed by: RADIOLOGY

## 2023-08-07 RX ORDER — ALPRAZOLAM 0.5 MG/1
0.5 TABLET ORAL NIGHTLY PRN
Qty: 20 TABLET | Refills: 0 | Status: SHIPPED | OUTPATIENT
Start: 2023-08-07

## 2023-08-07 RX ORDER — ONDANSETRON 4 MG/1
4 TABLET, ORALLY DISINTEGRATING ORAL EVERY 8 HOURS PRN
Qty: 30 TABLET | Refills: 0 | Status: SHIPPED | OUTPATIENT
Start: 2023-08-07

## 2023-08-08 ENCOUNTER — TELEPHONE (OUTPATIENT)
Dept: FAMILY MEDICINE CLINIC | Facility: CLINIC | Age: 58
End: 2023-08-08
Payer: COMMERCIAL

## 2023-08-08 ENCOUNTER — HOSPITAL ENCOUNTER (OUTPATIENT)
Dept: RADIATION ONCOLOGY | Facility: HOSPITAL | Age: 58
Discharge: HOME | End: 2023-08-08
Payer: COMMERCIAL

## 2023-08-08 ENCOUNTER — HOSPITAL ENCOUNTER (OUTPATIENT)
Dept: RADIATION ONCOLOGY | Facility: HOSPITAL | Age: 58
Discharge: HOME | End: 2023-08-08

## 2023-08-08 ENCOUNTER — TELEPHONE (OUTPATIENT)
Dept: FAMILY MEDICINE CLINIC | Facility: CLINIC | Age: 58
End: 2023-08-08

## 2023-08-08 VITALS
RESPIRATION RATE: 12 BRPM | DIASTOLIC BLOOD PRESSURE: 64 MMHG | WEIGHT: 136.47 LBS | HEART RATE: 87 BPM | OXYGEN SATURATION: 97 % | BODY MASS INDEX: 22.01 KG/M2 | SYSTOLIC BLOOD PRESSURE: 111 MMHG | TEMPERATURE: 98.1 F

## 2023-08-08 DIAGNOSIS — C32.1 SQUAMOUS CELL CARCINOMA OF EPIGLOTTIS: Primary | ICD-10-CM

## 2023-08-08 DIAGNOSIS — L97.212 VENOUS STASIS ULCER OF RIGHT CALF WITH FAT LAYER EXPOSED WITHOUT VARICOSE VEINS: ICD-10-CM

## 2023-08-08 DIAGNOSIS — I87.2 VENOUS STASIS ULCER OF LEFT CALF WITH FAT LAYER EXPOSED WITHOUT VARICOSE VEINS: ICD-10-CM

## 2023-08-08 DIAGNOSIS — L97.222 VENOUS STASIS ULCER OF LEFT CALF WITH FAT LAYER EXPOSED WITHOUT VARICOSE VEINS: ICD-10-CM

## 2023-08-08 DIAGNOSIS — I87.2 VENOUS STASIS ULCER OF RIGHT CALF WITH FAT LAYER EXPOSED WITHOUT VARICOSE VEINS: ICD-10-CM

## 2023-08-08 LAB
RAD ONC ARIA COURSE ID: NORMAL
RAD ONC ARIA COURSE INTENT: NORMAL
RAD ONC ARIA COURSE LAST TREATMENT DATE: NORMAL
RAD ONC ARIA COURSE START DATE: NORMAL
RAD ONC ARIA COURSE TREATMENT ELAPSED DAYS: 36
RAD ONC ARIA FIRST TREATMENT DATE: NORMAL
RAD ONC ARIA PLAN FRACTIONS TREATED TO DATE: 20
RAD ONC ARIA PLAN ID: NORMAL
RAD ONC ARIA PLAN PRESCRIBED DOSE PER FRACTION: 2 GY
RAD ONC ARIA PLAN PRIMARY REFERENCE POINT: NORMAL
RAD ONC ARIA PLAN TOTAL FRACTIONS PRESCRIBED: 35
RAD ONC ARIA PLAN TOTAL PRESCRIBED DOSE: 7000 CGY
RAD ONC ARIA REFERENCE POINT DOSAGE GIVEN TO DATE: 40 GY
RAD ONC ARIA REFERENCE POINT ID: NORMAL
RAD ONC ARIA REFERENCE POINT SESSION DOSAGE GIVEN: 2 GY

## 2023-08-08 PROCEDURE — 77336 RADIATION PHYSICS CONSULT: CPT | Performed by: RADIOLOGY

## 2023-08-08 PROCEDURE — 77014 CHG CT GUIDANCE RADIATION THERAPY FLDS PLACEMENT: CPT | Performed by: RADIOLOGY

## 2023-08-08 PROCEDURE — 77386: CPT | Performed by: RADIOLOGY

## 2023-08-08 RX ORDER — GINSENG 100 MG
1 CAPSULE ORAL 2 TIMES DAILY
Qty: 28 G | Refills: 5 | Status: SHIPPED | OUTPATIENT
Start: 2023-08-08

## 2023-08-08 NOTE — TELEPHONE ENCOUNTER
Caller: Isha Llanes    Relationship to patient: Self    Best call back number: [phone]     Patient is needing: PATIENT STATES SHE NEEDS WOUND CARE SUPPLY ORDERS SENT TO [facility] SO SHE CAN GET THEM UNTIL SHE IS ABLE TO GET IN TO THE WOUND CARE CENTER.    PATIENT STATES SHE IS OUT OF EVERYTHING;    SALINE WATER  GLOVES  ACE BANDAGES  4X4 GAUZE  CURLALEX

## 2023-08-08 NOTE — PROGRESS NOTES
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  She is receiving radiation therapy to the head and neck. She  has received 4000 cGy in 20 fractions out of a planned dose of 7000 cGy in 35 fractions.    Still losing some weight from suboptimal enteral tube feeding.  Cannot taste food of any kind.  Anterior neck skin with some discomfort/burning.    Review of Systems:   Review of Systems   Constitutional:  Positive for appetite change (decreased), fatigue (pt falling asleep in motorized wheelchair but patient states that fatigue has improved.) and unexpected weight change (Down 5lbs in 1 week.).        Enteral nutrition 2-3 cans per day     HENT:  Positive for rhinorrhea (Yellow/white/clear), sore throat (states that her throat is sore and is unable to eat due to this), tinnitus (Occasional, ongoing), trouble swallowing and voice change (hoarseness). Negative for ear pain.         States feels like her ears are dry and itchy.     Respiratory:  Positive for cough (productive cough,) and shortness of breath (with exertion).    Cardiovascular:  Positive for chest pain (Occasional fullness, denies any radiation.  States that she has had a heart attack in the past and this is a different pain.) and leg swelling (Ongoing). Negative for palpitations.   Gastrointestinal:  Positive for constipation (waxes and wanes- educated on use of stool softener) and nausea (occasionally). Negative for anal bleeding, blood in stool and diarrhea.   Genitourinary:  Positive for difficulty urinating (occasional hesitancy) and frequency (ongoing). Negative for dysuria and urgency.        C/o foul vaginal smell     Musculoskeletal:  Positive for arthralgias (States that her leg pain has worsened.), back pain and neck  pain.   Skin:  Positive for color change (hyperpigmentation noted to neck), rash (Noted on arms, started last week) and wound (Noted to bilateral legs.).        Wound (diabetic Ulcer BLE)   Neurological:  Positive for weakness (very lethargic--keeps falling asleep when asking questions/during conversation), light-headedness and headaches (Slight headache daily for 1 week, random times.).        States that the top of her head feels swollen and feels sore.  Lethargic, nodding off during visit     Psychiatric/Behavioral:  Positive for sleep disturbance (ongoing).      Vitals:     Vitals:    08/08/23 1013   BP: 111/64   Pulse: 87   Resp: 12   Temp: 98.1 øF (36.7 øC)   SpO2: 97%       Weight:   Wt Readings from Last 3 Encounters:   08/08/23 61.9 kg (136 lb 7.4 oz)   08/02/23 64.4 kg (141 lb 15.6 oz)   08/01/23 64.7 kg (142 lb 10.2 oz)      Pain:    Pain Score    08/08/23 1013   PainSc:   8   PainLoc: Throat         Physical Exam:  Gen: Appears fatigued and somewhat somnolent  HEENT: MMM; mild anterior neck hyperpigmentation with no desquamation; no thrush  Trachea: midline  Chest: symmetric  Resp: normal respiratory effort  Extr: warm, well-perfused  Neuro: awake and alert; no aphasia or neglect    Plan: I have reviewed treatment setup notes, checked and approved the daily guidance images.  I reviewed dose delivery, treatment parameters and deemed them appropriate. We plan to continue radiation therapy as prescribed.    Discussed skin care as well as expectations regarding skin changes for the remainder of her radiotherapy course.  Once again, encouraged increasing enteral tube feeds      Radiation Oncology    Electronically signed by Goyo Nunez MD 8/8/2023  12:04 EDT

## 2023-08-08 NOTE — TELEPHONE ENCOUNTER
Caller: Isha Llanes    Relationship: Self    Best call back number: [phone]     Requested Prescriptions:   Requested Prescriptions     Pending Prescriptions Disp Refills    bacitracin 500 UNIT/GM ointment 28 g 5     Sig: Apply 1 application  topically to the appropriate area as directed 2 (Two) Times a Day.        Pharmacy where request should be sent: [facility] - [place], [place] - [address] [facility] - [phone]  - [phone]      Last office visit with prescribing clinician: 6/7/2023   Last telemedicine visit with prescribing clinician: Visit date not found   Next office visit with prescribing clinician: 8/11/2023     Additional details provided by patient: PATIENT IS OUT OF THIS CREAM    Does the patient have less than a 3 day supply:  [x] Yes  [] No    Would you like a call back once the refill request has been completed: [] Yes [] No    If the office needs to give you a call back, can they leave a voicemail: [] Yes [] No    Marianne Gutierrez, PCT   08/08/23 13:38 EDT

## 2023-08-10 ENCOUNTER — DOCUMENTATION (OUTPATIENT)
Dept: RADIATION ONCOLOGY | Facility: HOSPITAL | Age: 58
End: 2023-08-10
Payer: COMMERCIAL

## 2023-08-10 ENCOUNTER — HOSPITAL ENCOUNTER (OUTPATIENT)
Dept: GENERAL RADIOLOGY | Facility: HOSPITAL | Age: 58
Discharge: HOME | End: 2023-08-10
Admitting: RADIOLOGY
Payer: COMMERCIAL

## 2023-08-10 ENCOUNTER — HOSPITAL ENCOUNTER (OUTPATIENT)
Dept: RADIATION ONCOLOGY | Facility: HOSPITAL | Age: 58
Discharge: HOME | End: 2023-08-10
Payer: COMMERCIAL

## 2023-08-10 VITALS — WEIGHT: 137.79 LBS | BODY MASS INDEX: 22.22 KG/M2

## 2023-08-10 DIAGNOSIS — R13.12 OROPHARYNGEAL DYSPHAGIA: Primary | ICD-10-CM

## 2023-08-10 DIAGNOSIS — C32.1 SQUAMOUS CELL CARCINOMA OF EPIGLOTTIS: ICD-10-CM

## 2023-08-10 LAB
RAD ONC ARIA COURSE ID: NORMAL
RAD ONC ARIA COURSE INTENT: NORMAL
RAD ONC ARIA COURSE LAST TREATMENT DATE: NORMAL
RAD ONC ARIA COURSE START DATE: NORMAL
RAD ONC ARIA COURSE TREATMENT ELAPSED DAYS: 38
RAD ONC ARIA FIRST TREATMENT DATE: NORMAL
RAD ONC ARIA PLAN FRACTIONS TREATED TO DATE: 21
RAD ONC ARIA PLAN ID: NORMAL
RAD ONC ARIA PLAN PRESCRIBED DOSE PER FRACTION: 2 GY
RAD ONC ARIA PLAN PRIMARY REFERENCE POINT: NORMAL
RAD ONC ARIA PLAN TOTAL FRACTIONS PRESCRIBED: 35
RAD ONC ARIA PLAN TOTAL PRESCRIBED DOSE: 7000 CGY
RAD ONC ARIA REFERENCE POINT DOSAGE GIVEN TO DATE: 42 GY
RAD ONC ARIA REFERENCE POINT ID: NORMAL
RAD ONC ARIA REFERENCE POINT SESSION DOSAGE GIVEN: 2 GY

## 2023-08-10 PROCEDURE — 74230 X-RAY XM SWLNG FUNCJ C+: CPT

## 2023-08-10 PROCEDURE — 77427 RADIATION TX MANAGEMENT X5: CPT | Performed by: RADIOLOGY

## 2023-08-10 PROCEDURE — 77386: CPT | Performed by: RADIOLOGY

## 2023-08-10 PROCEDURE — 92611 MOTION FLUOROSCOPY/SWALLOW: CPT

## 2023-08-10 PROCEDURE — 77014 CHG CT GUIDANCE RADIATION THERAPY FLDS PLACEMENT: CPT | Performed by: RADIOLOGY

## 2023-08-10 PROCEDURE — 92610 EVALUATE SWALLOWING FUNCTION: CPT | Performed by: SPEECH-LANGUAGE PATHOLOGIST

## 2023-08-10 RX ADMIN — BARIUM SULFATE 50 ML: 400 SUSPENSION ORAL at 11:37

## 2023-08-10 RX ADMIN — BARIUM SULFATE 355 ML: 0.6 SUSPENSION ORAL at 11:36

## 2023-08-10 RX ADMIN — BARIUM SULFATE 1 TEASPOON(S): 0.6 CREAM ORAL at 11:36

## 2023-08-10 NOTE — MBS/VFSS/FEES
Outpatient  - Speech Language Pathology  Modified barium Swallow  JEMMA Go     Patient Name: Isha Llanes  : 1965  MRN: [id number]  Today's Date: 8/10/2023               Admit Date: 8/10/2023     Visit Dx:  No diagnosis found.  Patient Active Problem List   Diagnosis    Septic shock    Acute MI    Cancer associated pain    Presence of intrathecal pump    Chronic low back pain with bilateral sciatica    Sacroiliac inflammation    Chronic pain syndrome    Pelvic pain    Aortic stenosis, severe    Dyspnea on effort    Other pulmonary embolism without acute cor pulmonale    Nonrheumatic aortic valve stenosis    Hip pain    Anxiety disorder    Allergic rhinitis due to allergen    Arthritis    Asthma    Kidney disease    CHF (congestive heart failure)    Chronic obstructive pulmonary disease    Diabetes mellitus, type 2    Depression    GERD (gastroesophageal reflux disease)    S/P TAVR (transcatheter aortic valve replacement)    Limited mobility    Venous hypertension of both lower extremities    Nicotine dependence    Mitral valve prolapse    Lupus (systemic lupus erythematosus)    Long term current use of anticoagulant therapy    History of Hodgkin's lymphoma    Hepatic steatosis    Heart murmur    CAD (coronary artery disease)    Non-healing wound of lower extremity, subsequent encounter    MSSA (methicillin susceptible Staphylococcus aureus) infection    Sepsis    Change in bowel habits    Nausea    Venous stasis ulcer of right calf    Venous stasis ulcer of left calf    Noncompliance    Peripheral vascular disease    Obesity with body mass index 30 or greater    Neurologic disorder    Neck pain    Limb pain    Hiatal hernia    Bladder disorder    Frailty    Cellulitis of right foot    Bowel disease    Atrophy of skin    Breast lump    Encounter for care related to vascular access port    Primary osteoarthritis of right hip    Tobacco abuse    Laryngeal cancer    Throat pain    Voice hoarseness     Hodgkin lymphoma    Malignant neoplasm of supraglottis    Dysphagia     Past Medical History:   Diagnosis Date    Anesthesia     REPORTS HAS GOT REAL EMOTIONAL AND HAS BECOME ANGRY BEFORE    Anxiety     Aortic stenosis, severe     HAS BIO VALVE REPLACED    Arthritis     Asthma     Back pain     Blood clotting disorder     Cancer     Cancer associated pain     HODGKINS LYMPHOMA    CHF (congestive heart failure)     Chronic low back pain with sciatica     Chronic pain disorder     COPD (chronic obstructive pulmonary disease)     Coronary artery disease     Diabetes mellitus     TYPE 2    Diabetes mellitus     Dyspnea on effort     GERD (gastroesophageal reflux disease)     H/O lumpectomy     H/O: hysterectomy     Hiatal hernia     History of degenerative disc disease     History of kidney stones     History of pulmonary embolus (PE)     History of transfusion     AS A CHILD NO TRANSFUSION REACTION    History of transfusion     Hodgkin's lymphoma     5/19/23  5 YEARS SINCE LAST CHEMO TX    Hypertension     Immobility     Liver disease     DENIES ANY CURRENT ISSUES    Lupus     Mitral valve prolapse     Panic disorder     Pelvic pain     Peripheral neuropathy     Personal history of DVT (deep vein thrombosis)     Presence of intrathecal pump     PTSD (post-traumatic stress disorder)     Sacroiliac joint disease     SI (sacroiliac) joint inflammation     Sleep apnea     Venous insufficiency      Past Surgical History:   Procedure Laterality Date    ABDOMINAL SURGERY      BACK SURGERY      SPINAL FUSION     BACK SURGERY      BLADDER REPAIR      CARDIAC CATHETERIZATION N/A 03/06/2019    Procedure: Valvuloplasty;  Surgeon: Wayne Johnson MD;  Location: [facility] CATH INVASIVE LOCATION;  Service: Cardiology    CARDIAC CATHETERIZATION      CARDIAC CATHETERIZATION Left 5/31/2023    Procedure: Cardiac Catheterization/Vascular Study;  Surgeon: Poncho Reid MD;  Location: [facility] CATH INVASIVE LOCATION;  Service:  Cardiovascular;  Laterality: Left;    CARDIAC SURGERY      CARDIAC VALVE REPLACEMENT  2021    CHOLECYSTECTOMY      COLONOSCOPY N/A 12/29/2021    Procedure: COLONOSCOPY;  Surgeon: Karine Orlando MD;  Location: [facility] ENDOSCOPY;  Service: Gastroenterology;  Laterality: N/A;  POOR PREP    COLONOSCOPY N/A 04/13/2022    Procedure: COLONOSCOPY WITH POLYPECTOMY;  Surgeon: Karine Orlando MD;  Location: [facility] ENDOSCOPY;  Service: Gastroenterology;  Laterality: N/A;  COLON POLYP, HEMORRHOIDS, POOR PREP    COLONOSCOPY      DIRECT LARYNGOSCOPY, ESOPHAGOSCOPY, BRONCHOSCOPY N/A 5/22/2023    Procedure: DIRECT LARYNGOSCOPY WITH BIOPSIES , ESOPHAGOSCOPY, BRONCHOSCOPY;  Surgeon: Ronnie Mcgarry MD;  Location: [facility] OR [facility];  Service: ENT;  Laterality: N/A;    ENDOSCOPY N/A 12/29/2021    Procedure: ESOPHAGOGASTRODUODENOSCOPY;  Surgeon: Karine Orlando MD;  Location: [facility] ENDOSCOPY;  Service: Gastroenterology;  Laterality: N/A;  ESOPHAGITIS, GASTRITIS, HIATAL HERNIA    ENDOSCOPY      HYSTERECTOMY      LYMPHADENECTOMY      PAIN PUMP INSERTION/REVISION N/A 10/18/2019    Procedure: PAIN PUMP INSERTION [facility] [place] [facility] [place];  Surgeon: Horace Rios MD;  Location: [facility] OR;  Service: Pain Management    PAIN PUMP INSERTION/REVISION N/A 11/23/2020    Procedure: pain pump removal;  Surgeon: Horace Rios MD;  Location: [facility] OR;  Service: Pain Management;  Laterality: N/A;    PEG TUBE INSERTION N/A 7/26/2023    Procedure: PERCUTANEOUS ENDOSCOPIC GASTROSTOMY TUBE INSERTION;  Surgeon: Kody Mercer MD;  Location: [facility] ENDOSCOPY;  Service: General;  Laterality: N/A;  SUCCESSFUL PEG TUBE PLACE PLACEMENT    PORTACATH PLACEMENT      IN LEFT CHEST REPORTS THAT IT IS NOT FUNCTIONING    SKIN BIOPSY      TONSILLECTOMY      TUBAL ABDOMINAL LIGATION      TUMOR REMOVAL       MODIFIED BARIUM SWALLOW STUDY: SPEECH PATHOLOGY REPORT    PERTINENT INFORMATION:  This patient is a 57year old female  referred for modified barium swallow study.  Patient with head neck cancer status post radiation treatment.  Patient has PEG as primary source of nutrition/hydration.    Patient was referred for an MBSS by Dr. Nunez to rule out aspiration as well as to determine appropriate treatment plan for this patient.      PROCEDURE:    Patient was alert and cooperative.  The patient was viewed in lateral projection.  The following Ba consistencies were administered: Thin liquids, nectar thick liquids, honey thick liquids, pur‚e consistencies and softened cracker with barium.  The following compensatory swallowing strategies were performed: Double swallow, chin tuck, bolus size modification      RESULTS:    1.  Oral stage is characterized by adequate bolus preparation and control.  Appears to have timely oral transit.  Pharyngeal phase with reduced epiglottic inversion and reduced laryngeal elevation.  Swallow completed with pur‚e consistencies with shallow laryngeal penetration.  Swallow completed with nectar thick liquids with deep laryngeal penetration nearing level of the cords with progressive trials.  Swallow completed with honey thick liquids with shallow laryngeal penetration however does not full clear laryngeal vestibule when cued to throat clear.  Laryngeal penetration with thin liquids to the level of the cords  No cough was triggered.  Swallow completed with softened cracker with shallow penetration.  No significant pharyngeal residue after the swallow.  Attempted chin tuck, double swallow and bolus size modification however these were not effective in minimizing aspiration risk.    IMPRESSIONS:    Patient demonstrated moderate to severe pharyngeal dysphagia characterized by reduced epiglottic inversion and reduced laryngeal elevation.  Patient with deep laryngeal penetration to the cords with thin liquids, laryngeal penetration with all other trials.  No overt aspiration however patient is at risk of aspiration  with p.o. intake.     FUNCTIONAL DEFICIT: Patient scored level 3 of 7 on Functional Communication Measures for swallowing indicating a 60-79% limitation in function for current status, goal status, and discharge status.      RECOMMENDATIONS:   1.  Utilize PEG as primary source of nutrition/hydration  2.  Allow comfort/pleasure feedings of pur‚e consistencies      YES: Patient/responsible party agrees with the plan of care and has been informed of all alternatives, risks and benefits.    Thank you for this referral.    EDUCATION  The patient has been educated in the following areas:     Dysphagia (Swallowing Impairment).             Corinne Little, SLP  8/10/2023

## 2023-08-10 NOTE — PROGRESS NOTES
Patient here today in clinic c/o having issues with her PEG tube.  Assessed patient's PEG tube.  Skin surrounding PEG tube insertion site was very red and irritated with yellow/green thick drainage on dressing.  Pt complained of tenderness surrounding PEG tube with any touch and is swollen around site.  The tube looks as if it has been pulled out some since insertion.    Contacted Dr. Mercer's office (surgeon who placed PEG) and they stated that he is in the OR and to call his cell.   Called Dr. Mercer's cell and spoke with him regarding the above.  He stated that he would come down to radiation oncology to assess the PEG.    After assessing PEG, Dr. Mercer does not think that it is infected.  He states that the redness is irritation from gastric juices and to have patient apply Desitin to her skin around PEG tube insertion and then place clean gauze.  He also stated that she needs to make sure the tube stays at 5 1/2 at the insertion site.  Ms Weatherford v/u.  PEG was cleaned and clean dressing was applied.   No further issues or concerns voiced.  Pt was transported upstairs for barium swallow study.

## 2023-08-10 NOTE — PROGRESS NOTES
Outpatient Speech Language Pathology   Adult Swallow Initial Evaluation  [facility] Speech Pathology- Radiation Oncology     Patient Name: Isha Llanes                       : 1965                      MRN: [id number]  Today's Date: 8/3/2023                             Visit Date: 2023            Outpatient Dysphagia Evaluation     History  Intake  History  Intake  Date of Service: 8/3/23  Physician: Goyo Nunez MD  Next Physician Visit: ongoing  Diagnosis: oropharyngeal dysphagia    Treatment Diagnosis: oropharyngeal dysphagia  Hx of Hospitalization yes, pneumonia and sepsis per patient report.   Previous Function :Yes with regurgitation of solids  Previous Therapy Services: yes acute care 2023 with downgrade of diet to mechanical soft solids and thin liquids.   Current Home Health Admission: no  Visit number: 1  HPI  Onset Date: 2 years  Age: 57  Gender: female  History of diagnosis:   Ms. Llanes is a 37-year-old female with a diagnosis of malignant neoplasm of supraglottis.  She is receiving radiation therapy to the head and the neck with the plan of 7000 cGy in 35 fractions to address erosive lesion along the laryngeal surface of the epiglottis that essentially encompasses the entire laryngeal epiglottis.  She is at the midpoint of her radiation therapy.        Patient is complaining of ongoing dysphagia and has downgraded her diet to mechanical soft solids and thin liquids with main nutrition by PEG tube.    Patient is complaining of coughing on liquids and residuals in the throat.  Additionally, she is experiencing odynophagia, xerostomia and dysgeusia.       Patient is here today for a swallow evaluation to determine safety of swallow,  and determine a proper safe diet.     Precautions: fall precautions with patient using a scooter for transportation  Patient's Goals/Expectations: Maintain swallow at highest levels of functioning     Current Diet Level: thin liquids and masticated  solids     Comments: Patient lives alone in [place], [place]. She is an active smoker of 2 packs per day and denies use of alcohol.         Psychosocial History     Usual Living Arrangement: lives alone  Psychosocial History: depression and anxiety  Nutritional Problems: yes     Past Medical History     Pertinent Past Medical History:   Medical History[]Expand by Default           Past Medical History:   Diagnosis Date    Anesthesia       REPORTS HAS GOT REAL EMOTIONAL AND HAS BECOME ANGRY BEFORE    Anxiety      Aortic stenosis, severe       HAS BIO VALVE REPLACED    Arthritis      Asthma      Back pain      Blood clotting disorder      Cancer      Cancer associated pain       HODGKINS LYMPHOMA    CHF (congestive heart failure)      Chronic low back pain with sciatica      Chronic pain disorder      COPD (chronic obstructive pulmonary disease)      Coronary artery disease      Diabetes mellitus       TYPE 2    Diabetes mellitus      Dyspnea on effort      GERD (gastroesophageal reflux disease)      H/O lumpectomy      H/O: hysterectomy      Hiatal hernia      History of degenerative disc disease      History of kidney stones      History of pulmonary embolus (PE)      History of transfusion       AS A CHILD NO TRANSFUSION REACTION    History of transfusion      Hodgkin's lymphoma       5/19/23  5 YEARS SINCE LAST CHEMO TX    Hypertension      Immobility      Liver disease       DENIES ANY CURRENT ISSUES    Lupus      Mitral valve prolapse      Panic disorder      Pelvic pain      Peripheral neuropathy      Personal history of DVT (deep vein thrombosis)      Presence of intrathecal pump      PTSD (post-traumatic stress disorder)      Sacroiliac joint disease      SI (sacroiliac) joint inflammation      Sleep apnea      Venous insufficiency                    History of Seizures: no        Abuse     Patient being Hurt, Hit, Scared or Neglected?  no  Caregiver Neglect: no        Pain  Pain Intensity: 8  Pain  Location: throat  Pain Scale Used: Numerical  Pain Management Techniques: dilatin for pain per pump and hydrocodone per patient report.     Orientation     Level of Consciousness: alert and oriented times 3                       CLINICAL SWALLOW EVALUATION     Objective     Current Diet Level: mechanical soft solids and thin liquids,main nutrition by feeding tube  Oral Motor Exam: 3/5 oral motor strength, edentulous and has a bony mass on the middle, medial hard palate, patient demonstrating decreased ROM.     Soft Palate: impaired with patient complaining of nasal regurgitation when chin tucking on water.  Laryngeal Function and Elevation: decreased  Vocal Quality: hoarse  Swallow Reflex: delayed initiation of swallow  Positioning: upright, 90 degree hipflexion         P.O. Trials   Patient was clinically assessed for dysphagia with the following consistencies and results:   Patient was assessed using thin liquid of water, nectar thickened apple juice, pur‚e of applesauce,   Nectar thick liquid by spoon: Delay in initiation of swallow, multiple swallows, voice hoarse but not wet, decreased laryngeal elevation noted to palpation  Nectar thick by cup: Delayed initiation of swallow, multiple swallows to clear, voice hoarse but not wet, decreased laryngeal elevation to palpation.  Thin Liquid by spoon: Delayed initiation of swallow, cough, decreased laryngeal elevation to palpation.  Use of a chin tuck with 5 ml spoonful of water, delayed initiation of swallow, cough.  Thin Liquid by cup: Patient attempted a self initiated drink of thin liquid of water prior to the evaluation with noted coughing on thin liquids of water.    Thin liquid by straw: not addressed secondary to safety concerns  Puree solid: Delayed initiation of swallow with increased time to formulate a bolus and propel posterior, multiple swallows with complaints of residual in the oropharynx. No change in voicing  Soft Solid: Not addressed secondary to  safety concerns.  Solid: Not addressed secondary to safety concerns.      Additional Trials: N/A  Oral Preparatory Phase:  Impaired secondary to xerostomia, odynophagia and being edentulous with patient having trouble with cohesive bolus formulation.   Oral Phase: Impaired with decreased tongue base strength   Pharyngeal Phase: impaired with complaints of residual in the pharynx  Laryngeal Elevation/Coordination: impaired with decreased laryngeal elevation to palpation noted     Comments: Patient demonstrates moderate to severe oropharyngeal dysphagia secondary to decreased swallow strength and coordination and decrease laryngeal elevation.  Patient demonstrated clinical signs and symptoms of aspiration with thin liquids by 5 ml spoon and cup drink. Use of compensatory strategy of chin tuck was not successful in decreasing signs and symptoms of aspiration of thin liquids.    Dysphagia Criteria     Patient Demonstrates: Risk of aspiration, delayed swallow, impaired salivary gland performance, presence of structural lesions, inadequate laryngeal elevation     Swallow Function: Patient demonstrated  overt, clinical signs and symptoms of aspiration with thin liquids.  Patient demonstrates oropharyngeal  dysphagia secondary to decreased swallow strength and coordination and decreased laryngeal elevation.      Recommendations     Diet:   Continued use of PEG tube for nutrition, Nectar thickened liquids and puree solids as tolerated, Use of Myers Free Water Protocol in between meals with good oral care.  Position:  Upright, 90 degree hipflexion for all p.o. intake     Environment:  Quiet environment with ample time to feed.     Compensatory Techniques:  Alternate small sips of liquids with small bites of purees, use of PEG tube  intake to address nutrition concerns, effortful swallows     Medical Intervention:  MBSS strongly recommended secondary to a history of pneumonia and to rule out silent aspiration, determine  proper diet, and plan of care.   Follow up swallow evaluation at  2 weeks status post radiation.       ST Functional Communication Testing    Patient is rated on the Functional Communication Measures (FCM) for swallow at a level 4/7 with a 40-59% limitation. Evaluation only with follow-up care recommended at 2 weeks status post radiation.          Plan  Evaluation two weeks status post radiation, Evaluation only.     Thank you for the referral,   Isha Vu MS, CCC-SLP     [place] License:  [id number]

## 2023-08-10 NOTE — PROGRESS NOTES
"Outpatient Nutrition Oncology Follow Up    Patient Name: Isha Llanes  YOB: 1965  MRN: [id number]    Recommendation(s):   1 container Boost VHC either PO or via PEG tube 5 times/day + 190 mL h20 flush given 6 X day (if no other PO fluids are consumed)  PO / recreational feeds as tolerated (or per speech pathology).      Reason for Consult:  Radiation tx f/u; EN F/U       Today's Weight:  62.5 kg    Weight Change: 3% wt decline in 1 week, however has gained 1# since 8/8/23.  15% wt decline in 3 months. Significant wt decline.    Nutrition-related concerns: Nausea, Constipation, Anorexia, Early Satiety, and Dysphagia/odynophagia    Current PO intake: Minimal PO at this time.  Tried to consume some macaroni & cheese, but was unable to.  Seeing speech pathologist today.     Current Nutrition Supplement intake: Boost VHC (Please see consult or intervention)    Current EN RX: Gravity feeds of Boost VHC (Please see consult or intervention)    Consult or Intervention:  Pt reports to be either drinking the Boost VHC PO or administering the Boost VHC via her tube.  She reports approximately 4 are consumed daily + 60 mL of water before & after each feeding.  She reports to be drinking water \"all day long\".  Pt had a note written by her daughter which indicated pt needs more supplies and that 1 of her cases of EN formula was \"busted inside\".  DA also noted pt has redness and discharge coming from her PEG tube site.  Provided pt with 3 Enfit syringes.  Provided pt / DA with written instructions on calling [facility] regarding the supplies & issues with EN formula.  RN in radiation oncology to assess and evaluate pt's PEG tube site today.      Nutrition Diagnosis: Moderate malnutrition related to decreased ability to consume sufficient energy as evidenced by physiological causes increasing nutrient needs., hypermetabolic state., muscle wasting., fat loss., inadequate energy intake., and patient " report., 3% wt decline in 1 week, 15% wt decline in 3 months.    Plan: Will follow up per oncology nutrition protocol      Addendum 8/10/23:  Oncology RD had to speak with Dharmesh Alejandra) about another pt, therefore while on the phone explained the note that DA had pt bring today (regarding formula busted, supplies needed, etc.).  Nidia indicated she will give pt's DA a phone call about this.

## 2023-08-11 ENCOUNTER — TELEPHONE (OUTPATIENT)
Dept: FAMILY MEDICINE CLINIC | Facility: CLINIC | Age: 58
End: 2023-08-11

## 2023-08-11 ENCOUNTER — OFFICE VISIT (OUTPATIENT)
Dept: FAMILY MEDICINE CLINIC | Facility: CLINIC | Age: 58
End: 2023-08-11
Payer: COMMERCIAL

## 2023-08-11 VITALS
HEART RATE: 93 BPM | BODY MASS INDEX: 22.23 KG/M2 | OXYGEN SATURATION: 96 % | HEIGHT: 66 IN | TEMPERATURE: 98.4 F | DIASTOLIC BLOOD PRESSURE: 50 MMHG | SYSTOLIC BLOOD PRESSURE: 88 MMHG

## 2023-08-11 DIAGNOSIS — L97.212 VENOUS STASIS ULCER OF RIGHT CALF WITH FAT LAYER EXPOSED WITHOUT VARICOSE VEINS: ICD-10-CM

## 2023-08-11 DIAGNOSIS — R13.10 DYSPHAGIA, UNSPECIFIED TYPE: ICD-10-CM

## 2023-08-11 DIAGNOSIS — I87.2 VENOUS STASIS ULCER OF RIGHT CALF WITH FAT LAYER EXPOSED WITHOUT VARICOSE VEINS: ICD-10-CM

## 2023-08-11 DIAGNOSIS — Z79.4 TYPE 2 DIABETES MELLITUS WITH HYPERGLYCEMIA, WITH LONG-TERM CURRENT USE OF INSULIN: Primary | ICD-10-CM

## 2023-08-11 DIAGNOSIS — L97.222 VENOUS STASIS ULCER OF LEFT CALF WITH FAT LAYER EXPOSED WITHOUT VARICOSE VEINS: ICD-10-CM

## 2023-08-11 DIAGNOSIS — I87.2 VENOUS STASIS ULCER OF LEFT CALF WITH FAT LAYER EXPOSED WITHOUT VARICOSE VEINS: ICD-10-CM

## 2023-08-11 DIAGNOSIS — E11.65 TYPE 2 DIABETES MELLITUS WITH HYPERGLYCEMIA, WITH LONG-TERM CURRENT USE OF INSULIN: Primary | ICD-10-CM

## 2023-08-11 PROCEDURE — 99213 OFFICE O/P EST LOW 20 MIN: CPT | Performed by: NURSE PRACTITIONER

## 2023-08-11 PROCEDURE — 3044F HG A1C LEVEL LT 7.0%: CPT | Performed by: NURSE PRACTITIONER

## 2023-08-11 NOTE — PROGRESS NOTES
"Chief Complaint  wounds on legs    Subjective         Isha Llanes presents to [facility]  HPI   Presents today for concerns of wounds on legs.  Patient has venous stasis with chronic leg ulcerations.  She recently ran out of wound care supplies.  It has been a while since she has been to wound care.  Wound care requested her to follow-up in office to get additional supplies.  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  She has a PEG tube.  Recently had a sweaty study evaluation.  Following recommendations from speech are    Diet:   Continued use of PEG tube for nutrition, Nectar thickened liquids and puree solids as tolerated, Use of Myers Free Water Protocol in between meals with good oral care.  Position:  Upright, 90 degree hipflexion for all p.o. intake     Environment:  Quiet environment with ample time to feed.     Compensatory Techniques:  Alternate small sips of liquids with small bites of purees, use of PEG tube  intake to address nutrition concerns, effortful swallows      Social History     Socioeconomic History    Marital status: Legally    Tobacco Use    Smoking status: Every Day     Packs/day: 1.50     Years: 20.00     Pack years: 30.00     Types: Cigarettes     Start date: 1979    Smokeless tobacco: Never   Vaping Use    Vaping Use: Never used   Substance and Sexual Activity    Alcohol use: Never    Drug use: Never    Sexual activity: Not Currently        Objective     Vitals:    08/11/23 1358   BP: (!) 88/50   BP Location: Left arm   Patient Position: Sitting   Cuff Size: Adult   Pulse: 93   Temp: 98.4 øF (36.9 øC)   TempSrc: Oral   SpO2: 96%   Height: 167.7 cm (66.02\")        Body mass index is 22.23 kg/mý.    Wt Readings from Last 3 Encounters:   08/10/23 62.5 kg (137 lb 12.6 oz)   08/08/23 61.9 kg (136 lb 7.4 oz)   08/02/23 64.4 kg (141 lb 15.6 oz)       BP Readings from Last 3 Encounters:   08/11/23 (!) " 88/50   08/08/23 111/64   08/01/23 108/80         Physical Exam  Vitals reviewed.   Constitutional:       General: She is sleeping.      Appearance: Normal appearance. She is well-developed.   HENT:      Head: Normocephalic and atraumatic.      Right Ear: External ear normal.      Left Ear: External ear normal.      Mouth/Throat:      Pharynx: No oropharyngeal exudate.   Eyes:      Conjunctiva/sclera: Conjunctivae normal.      Pupils: Pupils are equal, round, and reactive to light.   Cardiovascular:      Rate and Rhythm: Normal rate and regular rhythm.      Heart sounds: No murmur heard.    No friction rub. No gallop.   Pulmonary:      Effort: Pulmonary effort is normal.      Breath sounds: Normal breath sounds. No wheezing or rhonchi.   Skin:     General: Skin is warm and dry.      Findings: Wound present.   Neurological:      Mental Status: She is oriented to person, place, and time.        Result Review :   The following data was reviewed by: LUZ Zamora on 08/11/2023:      Procedures    Assessment and Plan   Diagnoses and all orders for this visit:    1. Type 2 diabetes mellitus with hyperglycemia, with long-term current use of insulin (Primary)  -     Ambulatory Referral to [facility]    2. Venous stasis ulcer of left calf with fat layer exposed without varicose veins    3. Venous stasis ulcer of right calf with fat layer exposed without varicose veins    4. Dysphagia, unspecified type        Diet:   Continued use of PEG tube for nutrition, Nectar thickened liquids and puree solids as tolerated, Use of Myers Free Water Protocol in between meals with good oral care.  Position:  Upright, 90 degree hipflexion for all p.o. intake     Environment:  Quiet environment with ample time to feed.     Compensatory Techniques:  Alternate small sips of liquids with small bites of purees, use of PEG tube  intake to address nutrition concerns, effortful swallows    Nutrition recommendations.    Boost VHC 5 cartons per day via gravity method with 190ml of FWF 7X/day (with each feed as well as in between).  Provides: 2650 kcal, 115 gm of protein, and 2125 ml of free water per day  BMI is within normal parameters. No other follow-up for BMI required.    She will need nectar thickened liquids and pur‚e solids as tolerated.  Discussed recommendations per speech.  Also reviewed nutritional recommendations per nutrition.  We will consult diabetic specialist.  She has stopped taking her insulin.      Follow Up   Return if symptoms worsen or fail to improve.  Patient was given instructions and counseling regarding her condition or for health maintenance advice. Please see specific information pulled into the AVS if appropriate.     Please note that portions of this note were completed with a voice recognition program.

## 2023-08-11 NOTE — TELEPHONE ENCOUNTER
PATIENTS DAUGHTER IS REQUESTING HOME HEALTH TO PROVIDE ASSISTANCE FOR MOTHER FOR FEEDING AND BASIC CARE. PLEASE ADVISE

## 2023-08-15 ENCOUNTER — HOSPITAL ENCOUNTER (OUTPATIENT)
Dept: RADIATION ONCOLOGY | Facility: HOSPITAL | Age: 58
Discharge: HOME | End: 2023-08-15

## 2023-08-15 ENCOUNTER — HOSPITAL ENCOUNTER (OUTPATIENT)
Dept: RADIATION ONCOLOGY | Facility: HOSPITAL | Age: 58
Discharge: HOME | End: 2023-08-15
Payer: COMMERCIAL

## 2023-08-15 VITALS
WEIGHT: 134.92 LBS | HEIGHT: 66 IN | OXYGEN SATURATION: 100 % | BODY MASS INDEX: 21.68 KG/M2 | SYSTOLIC BLOOD PRESSURE: 94 MMHG | HEART RATE: 84 BPM | RESPIRATION RATE: 16 BRPM | TEMPERATURE: 97.8 F | DIASTOLIC BLOOD PRESSURE: 58 MMHG

## 2023-08-15 DIAGNOSIS — C32.1 SQUAMOUS CELL CARCINOMA OF EPIGLOTTIS: Primary | ICD-10-CM

## 2023-08-15 LAB
RAD ONC ARIA COURSE ID: NORMAL
RAD ONC ARIA COURSE INTENT: NORMAL
RAD ONC ARIA COURSE LAST TREATMENT DATE: NORMAL
RAD ONC ARIA COURSE START DATE: NORMAL
RAD ONC ARIA COURSE TREATMENT ELAPSED DAYS: 43
RAD ONC ARIA FIRST TREATMENT DATE: NORMAL
RAD ONC ARIA PLAN FRACTIONS TREATED TO DATE: 22
RAD ONC ARIA PLAN ID: NORMAL
RAD ONC ARIA PLAN PRESCRIBED DOSE PER FRACTION: 2 GY
RAD ONC ARIA PLAN PRIMARY REFERENCE POINT: NORMAL
RAD ONC ARIA PLAN TOTAL FRACTIONS PRESCRIBED: 35
RAD ONC ARIA PLAN TOTAL PRESCRIBED DOSE: 7000 CGY
RAD ONC ARIA REFERENCE POINT DOSAGE GIVEN TO DATE: 44 GY
RAD ONC ARIA REFERENCE POINT ID: NORMAL
RAD ONC ARIA REFERENCE POINT SESSION DOSAGE GIVEN: 2 GY

## 2023-08-15 PROCEDURE — 77014 CHG CT GUIDANCE RADIATION THERAPY FLDS PLACEMENT: CPT | Performed by: RADIOLOGY

## 2023-08-15 PROCEDURE — 77386: CPT | Performed by: RADIOLOGY

## 2023-08-15 NOTE — PROGRESS NOTES
On Treatment Visit       Patient: Isha Llanes   YOB: 1965   Medical Record Number: [id number]     Date of Visit  August 15, 2023   Primary Diagnosis:Squamous cell carcinoma of epiglottis [C32.1]         was seen today for an on treatment visit.  She is receiving radiation therapy to the head and neck. She  has received 4400 cGy in 20 fractions out of a planned dose of 7000 cGy in 35 fractions.    Receiving 2 enteral tube feeds per day and eating little by mouth.  Denies pain in head neck.  Experiencing cough with taking food or drink by mouth.  Having difficulty swallowing analgesic pills    Review of Systems:   Review of Systems   Constitutional:  Positive for appetite change (taking in very little by mouth- states that when she eats/drinks anything by mouth it makes her cough.  Taking in 2 cans per PEG daily.), fatigue (8/10) and unexpected weight change (down 13 lbs in one month.  Has lost 8 lbs in the last two weeks.).        Enteral nutrition 2-3 cans per day     HENT:  Positive for rhinorrhea (Yellow/white/clear), sore throat (states that her throat is sore and is unable to eat due to this), tinnitus (Occasional, ongoing), trouble swallowing (states that she coughs with any oral intake) and voice change (hoarseness). Negative for ear pain.         States feels like her ears are dry and itchy.  C/o dry mouth   Loss of taste   C/o thick secretions/saliva     Respiratory:  Positive for cough (productive cough,) and shortness of breath (with exertion).    Cardiovascular:  Positive for leg swelling (Ongoing). Negative for chest pain and palpitations.   Gastrointestinal:  Positive for constipation (waxes and wanes- educated on use of stool softener) and nausea (frequently.  Taking Zofran with relief). Negative for anal bleeding, blood in stool, diarrhea and vomiting.   Genitourinary:  Positive for difficulty urinating (occasional hesitancy) and frequency (ongoing). Negative for  dysuria and urgency.             Musculoskeletal:  Positive for arthralgias (States that her leg pain has worsened.), back pain, neck pain and neck stiffness (on left side behind ear).   Skin:  Positive for color change (hyperpigmentation noted to neck), rash (Noted on arms, started last week) and wound (Noted to bilateral legs.).        Wound (diabetic Ulcer BLE)   Neurological:  Positive for weakness (very lethargic--keeps falling asleep when asking questions/during conversation), light-headedness and headaches (slight headache in the a.m.).        Lethargic, nodding off during visit     Psychiatric/Behavioral:  Positive for sleep disturbance (ongoing).      Vitals:     Vitals:    08/15/23 1003   BP: 94/58   Pulse: 84   Resp: 16   Temp: 97.8 øF (36.6 øC)   SpO2: 100%       Weight:   Wt Readings from Last 3 Encounters:   08/15/23 61.2 kg (134 lb 14.7 oz)   08/10/23 62.5 kg (137 lb 12.6 oz)   08/08/23 61.9 kg (136 lb 7.4 oz)      Pain:    Pain Score    08/15/23 1003   PainSc:   8   PainLoc: Leg  Comment: in legs and and groin         Physical Exam:  Gen: Appears fatigued and somewhat somnolent  HEENT: MMM; mild anterior neck hyperpigmentation with no desquamation--no changes since last evaluated  Trachea: midline  Chest: symmetric  Resp: normal respiratory effort  Extr: warm, well-perfused  Neuro: awake and alert; no aphasia or neglect    Plan: I have reviewed treatment setup notes, checked and approved the daily guidance images.  I reviewed dose delivery, treatment parameters and deemed them appropriate. We plan to continue radiation therapy as prescribed.    Discussed appropriate nutritional intake; plans to increase daily enteral tube feeds  Will prescribe liquid opioid analgesics  Repeat CT simulation due to weight loss      Radiation Oncology    Electronically signed by Goyo Nunez MD 8/15/2023  11:09 EDT

## 2023-08-16 ENCOUNTER — TELEPHONE (OUTPATIENT)
Dept: RADIATION ONCOLOGY | Facility: HOSPITAL | Age: 58
End: 2023-08-16
Payer: COMMERCIAL

## 2023-08-16 ENCOUNTER — HOSPITAL ENCOUNTER (OUTPATIENT)
Dept: RADIATION ONCOLOGY | Facility: HOSPITAL | Age: 58
Discharge: HOME | End: 2023-08-16
Payer: COMMERCIAL

## 2023-08-16 ENCOUNTER — TELEPHONE (OUTPATIENT)
Dept: NUTRITION | Facility: HOSPITAL | Age: 58
End: 2023-08-16
Payer: COMMERCIAL

## 2023-08-16 DIAGNOSIS — M16.9 OSTEOARTHRITIS OF HIP, UNSPECIFIED LATERALITY, UNSPECIFIED OSTEOARTHRITIS TYPE: Primary | ICD-10-CM

## 2023-08-16 DIAGNOSIS — R29.898 WEAKNESS OF BOTH LOWER EXTREMITIES: ICD-10-CM

## 2023-08-16 NOTE — TELEPHONE ENCOUNTER
Diagnosis: Head and neck cancer    Reason for Referral: DME and home care needs    Content of Visit: OSW received a notification from oncology RD that Ms. Llanes is inquiring if insurance will pay for a recliner to help elevate her for her tube feeds. She's also continuing to experience issues with her power wheelchair. She expressed interest in getting home health care arranged and inquired about hospice care.  OSW contacted Ms. Llanes and advised that unfortunately her insurance will not pay for a recliner or lift chair and OSW also consulted with [employer] to confirm. Offered a hospital bed as an alternative, but she respectfully declined due to the size. Advised that places such as the [facility] sometimes have gently used recliners or lift chairs at a more reasonable gordon.  Ms. Llanes voiced issues with her PWC through [employer]. OSW will contact them to receive further explanation and clarification on the situation.  Began talking to Ms. Llanes about home health care to aid with her tube feedings, however, her nurse for her pain pump arrived and she quickly needed to get off the telephone. She requested to speak with the radiation oncology nurse about this more tomorrow while she's present for her CT Sim. OSW notified RN. OSW did not get to educate Ms. Llanes on hospice care services during our conversation today.  OSW contacted [employer]. Ms. Llanes is not eligible to receive a new PWC until September, which will be the 5 year ana cristina since her last. [employer] has provided her with a loaner in the meantime, as well as three sets of batteries to address the issues she's been experiencing with it holding charge. [employer] advised to have the provider send them an updated prescription with a qualifying mobility diagnosis. OSW will reach out to Ms. Llanes's PCP office to help coordinate. OSW support remains available.    Update 9/5/23: Confirmed  with radiation oncology nurse, Jerson SU, that Ms. Llanes is actively being seen by [facility]. OSW contacted [facility] this afternoon and confirmed she is receiving speech therapy and will be seen by their speech therapist tomorrow. OSW notified [facility] that Ms. Llanes is currently established with HH and therefore will need to cancel the appointment scheduled with her on 9/7/23. OSW support remains available.    Resources/Referrals Provided: Care coordination of DME and home health

## 2023-08-16 NOTE — PROGRESS NOTES
"Outpatient Nutrition Oncology Follow Up    Patient Name: Isha Llanes  YOB: 1965  MRN: [id number]    Recommendation(s):   Small / frequent gravity feeds of 125 mL VHC formula given 10 X day.  OR  For EN pump feeds, suggest goal rate of 83 ml/hr X 14 hours (nocturnal) + 175 mL h20 flush given 6 X day (or total 1050 mL FWF daily).      Reason for Consult:  Per Radiation Oncology nursing staff request       Today's Weight:  61.2 kg on 8/15/23    Weight Change: 1.8% wt decline in 1 week; 9.5% loss in 1 month    Estimated nutritional needs (daily):  2448 kcals, 122 gm protein, 2096-5868 mL fluid    Nutrition-related concerns: Early Satiety, Taste Alterations, Xerostomia, and Other: thick secretions / saliva  (causes coughing episodes upon attempted PO intake)    Current PO intake: minimal if any PO intake    Current EN RX:  Nestle Boost Very High Calorie (VHC), 2-3 cans per day (rarely 3)  Current EN schedule provides 9591-4224 kcals & 22-44 gm protein daily.    Consult or Intervention:  Spoke to pt via telephone due to not coming in to radiation tx- feels ill today.  Nursing staff had requested a nutrition consult yesterday (ongoing weight decline; not achieved EN goal) therefore attempted to contact DA earlier, but able to reach by phone.  Called pt:  she reports she has been giving 2 cans of the VHC, occasionally can tolerate 3 cans but rare, daily.  She reports using the homemade mouth rinse after being able to purchase some baking soda and reports an improvement in altered taste and xerostomia.  PO intake has been effected by mucus and thick secretions- can't take in much before begins coughing.  Reinforced the importance of getting to the goal of 5 total cans of VHC daily.  Pt reports she \"fills up\" fast and unable to tolerate an entire can at a time, even w/ gravity feeds.  Discussed options:  if pt unable to break up the containers / feedings into smaller increments (10 total feeds/day of " "1/2 can each), then consider EN pump to promote better tolerance to provide small / controlled amounts of formula nocturnally.  Pt stated she would need a recliner to help prop her up during the pump feeds @ night and requested OSW look into this option (question insurance coverage).  Communicated this information to OSW.  Pt also stated her daughter is doing her best to help assist in her care, but very busy- pt requested home health services at this time.  Communicated this to pt's radiation nurse.  Encouraged pt to let Oncology RD know if she decides to proceed with an EN pump in order to prevent further delay in providing adequate nutrition & hydration.    Nutrition Diagnosis: Severe malnutrition related to Inability to consume sufficient energy as evidenced by physiological causes increasing nutrient needs., hypermetabolic state., inadequate energy intake., patient report., and inadequate EN.    Plan: Will follow up per oncology nutrition protocol      Addendum 8/17/23:  Met with pt in person in radiation therapy.  She reports since our conversation yesterday she still can't tolerate more than 2 total cans of the Boost VHC daily due to the formula making her feel \"too full\".  She is able to give 1 can at a time, but occasionally experiences emesis.  She has not had any of the formula today so far (10:20 am).  She continues to report altered taste and dysphagia / odynophagia, however eating some solid foods that may be aggravating.  Explained main route of nutrition needs to be EN route (through feeding tube) and PO feeds are recreational (to keep function).  She received samples of Thick-It thickener for her liquids today.  Seeing Hyun, speech pathologist, today.  Refitting for her radiation tx mask due to significant wt decline.  Pt continues to decline an EN pump until she knows if she can receive a recliner or equipment to help elevate her during sleep (for nocturnal feeds).  Pt stated OSW called her " yesterday, but she couldn't answer the phone.  Provided pt with both business cards of our OSW's and strongly encouraged her to call to discuss this.    Recommendations given to pt:    Gravity feeds of 1/2 container (125 mL) of Boost VHC Q 2-3 hours (trial for best tolerance) preferably 10 X day, but as many times as tolerated during waking hours.  H20 flushes:  180 mL flush 6 X day.

## 2023-08-16 NOTE — PROGRESS NOTES
8/16/23:    Radiation oncology staff requested Oncology RD f/u with pt today.  Pt has called to say she is not coming to tx today due to not feeling well.  Due to ongoing significant wt decline and on PEG feeds (written instructions for pt's goal for tube feeds given multiple times)- attempted to reach pt's DA (Sheila Watson) by telephone.  Called [phone] phone#.  No answer therefore left  requesting a call back with direct phone#.  Attempted to call DA alternate phone# ([phone]) and this phone# is disconnected.    Oncology RD remains available per protocol.

## 2023-08-17 ENCOUNTER — HOSPITAL ENCOUNTER (OUTPATIENT)
Dept: RADIATION ONCOLOGY | Facility: HOSPITAL | Age: 58
Setting detail: RADIATION/ONCOLOGY SERIES
Discharge: HOME | End: 2023-08-17
Payer: COMMERCIAL

## 2023-08-17 ENCOUNTER — HOSPITAL ENCOUNTER (OUTPATIENT)
Dept: RADIATION ONCOLOGY | Facility: HOSPITAL | Age: 58
Discharge: HOME | End: 2023-08-17
Payer: COMMERCIAL

## 2023-08-17 ENCOUNTER — EDUCATION (OUTPATIENT)
Dept: SPEECH THERAPY | Facility: HOSPITAL | Age: 58
End: 2023-08-17
Payer: COMMERCIAL

## 2023-08-17 DIAGNOSIS — C32.1 MALIGNANT NEOPLASM OF SUPRAGLOTTIS: ICD-10-CM

## 2023-08-17 PROCEDURE — 77386: CPT | Performed by: RADIOLOGY

## 2023-08-17 PROCEDURE — 77334 RADIATION TREATMENT AID(S): CPT | Performed by: RADIOLOGY

## 2023-08-17 PROCEDURE — 77014 CHG CT GUIDANCE RADIATION THERAPY FLDS PLACEMENT: CPT | Performed by: RADIOLOGY

## 2023-08-17 NOTE — THERAPY PROGRESS REPORT/RE-CERT
Education      Patient educated on MBSS results with patient viewing PAC images with the therapist.  Patient educated in the fact that we cannot guarantee she will not aspirate and that she may develop aspiration pneumonia if she desires to eat by mouth. Main nutrition should be consumed via PEG tube.  Discussed signs and symptoms of aspiration.     Patient  was educated on safest pleasure feed  p.o. intake to include  small spoonfuls of nectar thickened liquids and puree solids with throat/cough after the swallow to remove any residue.  Patient was physically  shown how to thicken liquids and  was shown what a puree solid looks like.  Patient asked questions and was educated on how to puree solids.  She indicated she needs a .  Patient was sent home with puree solid of applesauce, a can of thickener and  asked to use her PEG tube for nutrition with no pleasure feeds of solids until a  is acquired.  Patient educated on the need for good oral care and if thirsty she may use spoonfuls of thin liquid outside of eating solids with good oral care. No drinking from a bottle,cup or straw.  However, patient educated that thin liquids was shown to be at the level of the vocal folds during last MBSS thus higher risk of aspiration.     Patient had questions regarding care of her feeding tube and retaping the feeding tube.  Matt Smallwood addressed patient's feeding tube questions and care.     Patient prompted to continue to swallow.

## 2023-08-17 NOTE — PROGRESS NOTES
Isha Shraddha (date of birth 1965)      To whom it may concern:      Patient has multiple significant medical problems including severe arthritis, severe lower extremity weakness, and high risk for falls.  Because of these issues, their mobility is significantly reduced.  Patient is at high risk for recurrent fall.  Patient would benefit from use of a motorized wheelchar.  Patient has sufficient upper extremity strength and mental capacity to operate device. Patient has a caregiver who is available, willing, and able to provide assistance with the device.       Thank you.      [unfilled]

## 2023-08-18 ENCOUNTER — HOSPITAL ENCOUNTER (OUTPATIENT)
Dept: RADIATION ONCOLOGY | Facility: HOSPITAL | Age: 58
Discharge: HOME | End: 2023-08-18
Payer: COMMERCIAL

## 2023-08-18 ENCOUNTER — RADIATION ONCOLOGY WEEKLY ASSESSMENT (OUTPATIENT)
Dept: RADIATION ONCOLOGY | Facility: HOSPITAL | Age: 58
End: 2023-08-18
Payer: COMMERCIAL

## 2023-08-18 VITALS
OXYGEN SATURATION: 97 % | RESPIRATION RATE: 16 BRPM | WEIGHT: 132.28 LBS | SYSTOLIC BLOOD PRESSURE: 96 MMHG | BODY MASS INDEX: 21.33 KG/M2 | HEART RATE: 101 BPM | DIASTOLIC BLOOD PRESSURE: 74 MMHG | TEMPERATURE: 99.3 F

## 2023-08-18 DIAGNOSIS — C32.1 SQUAMOUS CELL CARCINOMA OF EPIGLOTTIS: Primary | ICD-10-CM

## 2023-08-18 LAB
RAD ONC ARIA COURSE ID: NORMAL
RAD ONC ARIA COURSE INTENT: NORMAL
RAD ONC ARIA COURSE LAST TREATMENT DATE: NORMAL
RAD ONC ARIA COURSE START DATE: NORMAL
RAD ONC ARIA COURSE TREATMENT ELAPSED DAYS: 46
RAD ONC ARIA FIRST TREATMENT DATE: NORMAL
RAD ONC ARIA PLAN FRACTIONS TREATED TO DATE: 24
RAD ONC ARIA PLAN ID: NORMAL
RAD ONC ARIA PLAN PRESCRIBED DOSE PER FRACTION: 2 GY
RAD ONC ARIA PLAN PRIMARY REFERENCE POINT: NORMAL
RAD ONC ARIA PLAN TOTAL FRACTIONS PRESCRIBED: 35
RAD ONC ARIA PLAN TOTAL PRESCRIBED DOSE: 7000 CGY
RAD ONC ARIA REFERENCE POINT DOSAGE GIVEN TO DATE: 48 GY
RAD ONC ARIA REFERENCE POINT ID: NORMAL
RAD ONC ARIA REFERENCE POINT SESSION DOSAGE GIVEN: 4 GY

## 2023-08-18 PROCEDURE — 77014 CHG CT GUIDANCE RADIATION THERAPY FLDS PLACEMENT: CPT | Performed by: RADIOLOGY

## 2023-08-18 PROCEDURE — 77386: CPT | Performed by: RADIOLOGY

## 2023-08-18 NOTE — PROGRESS NOTES
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  She is receiving radiation therapy to the head and neck. She  has received 4800 cGy in 24 fractions out of a planned dose of 7000 cGy in 35 fractions.    Ms. Llanes has progression/persistence of esophagitis and is using her enteral feeding tube progressively.  She additionally reports thick mucus secretions.    Review of Systems:   Review of Systems   Constitutional:  Positive for appetite change (taking in very little by mouth- states that when she eats/drinks anything by mouth it makes her cough.  Taking in 2 cans per PEG daily.), fatigue (8/10) and unexpected weight change (down 13 lbs in one month.  Has lost 8 lbs in the last two weeks.).        Enteral nutrition 2-3 cans per day.  Encouraged increasing peg tube feeds to recommended amount of 5/day.  Explained to the patient the importance of thickening liquids and pureed foods per recommendations.       HENT:  Positive for rhinorrhea (Yellow/white/clear), sore throat (states that her throat is sore and is unable to eat due to this), tinnitus (Occasional, ongoing), trouble swallowing (states that she coughs with any oral intake) and voice change (hoarseness). Negative for ear pain.         States feels like her ears are dry and itchy.  C/o dry mouth   Loss of taste   C/o thick secretions/saliva  Currently has thrush       Respiratory:  Positive for cough (productive cough, with green secretions) and shortness of breath (with exertion).    Cardiovascular:  Positive for leg swelling (Ongoing). Negative for chest pain and palpitations.   Gastrointestinal:  Positive for constipation (waxes and wanes- educated on use of stool softener) and nausea (frequently.  Taking Zofran with relief).  Negative for anal bleeding, blood in stool, diarrhea and vomiting.   Genitourinary:  Positive for difficulty urinating (occasional hesitancy) and frequency (ongoing). Negative for dysuria and urgency.             Musculoskeletal:  Positive for arthralgias (States that her leg pain has worsened.), back pain, neck pain and neck stiffness (on left side behind ear).   Skin:  Positive for color change (hyperpigmentation noted to neck), rash (Noted on arms, started last week) and wound (Noted to bilateral legs.).        Wound (diabetic Ulcer BLE)   Neurological:  Positive for weakness (very lethargic--keeps falling asleep when asking questions/during conversation), light-headedness and headaches (slight headache in the a.m.).        Lethargic, nodding off during visit     Psychiatric/Behavioral:  Positive for sleep disturbance (ongoing).      Vitals:     Vitals:    08/18/23 1515   BP: 96/74   Pulse: 101   Resp: 16   Temp: 99.3 øF (37.4 øC)   SpO2: 97%       Weight:   Wt Readings from Last 3 Encounters:   08/18/23 60 kg (132 lb 4.4 oz)   08/15/23 61.2 kg (134 lb 14.7 oz)   08/10/23 62.5 kg (137 lb 12.6 oz)      Pain:    There were no vitals filed for this visit.        Physical Exam:  Gen: Appears fatigued and somewhat somnolent  HEENT: MMM; mild anterior neck hyperpigmentation with no desquamation--no changes since last evaluated  Trachea: midline  Chest: symmetric  Resp: normal respiratory effort  Extr: warm, well-perfused  Neuro: awake and alert; no aphasia or neglect    Plan: I have reviewed treatment setup notes, checked and approved the daily guidance images.  I reviewed dose delivery, treatment parameters and deemed them appropriate. We plan to continue radiation therapy as prescribed.    I am placing an order for home health care for PEG tube care/education as well as speech therapy.    Radiation Oncology    Electronically signed by Goyo Nunez MD 8/18/2023  15:48 EDT

## 2023-08-21 ENCOUNTER — HOSPITAL ENCOUNTER (OUTPATIENT)
Dept: RADIATION ONCOLOGY | Facility: HOSPITAL | Age: 58
Discharge: HOME | End: 2023-08-21
Payer: COMMERCIAL

## 2023-08-21 LAB
RAD ONC ARIA COURSE ID: NORMAL
RAD ONC ARIA COURSE INTENT: NORMAL
RAD ONC ARIA COURSE LAST TREATMENT DATE: NORMAL
RAD ONC ARIA COURSE START DATE: NORMAL
RAD ONC ARIA COURSE TREATMENT ELAPSED DAYS: 49
RAD ONC ARIA FIRST TREATMENT DATE: NORMAL
RAD ONC ARIA PLAN FRACTIONS TREATED TO DATE: 25
RAD ONC ARIA PLAN ID: NORMAL
RAD ONC ARIA PLAN PRESCRIBED DOSE PER FRACTION: 2 GY
RAD ONC ARIA PLAN PRIMARY REFERENCE POINT: NORMAL
RAD ONC ARIA PLAN TOTAL FRACTIONS PRESCRIBED: 35
RAD ONC ARIA PLAN TOTAL PRESCRIBED DOSE: 7000 CGY
RAD ONC ARIA REFERENCE POINT DOSAGE GIVEN TO DATE: 50 GY
RAD ONC ARIA REFERENCE POINT ID: NORMAL
RAD ONC ARIA REFERENCE POINT SESSION DOSAGE GIVEN: 2 GY

## 2023-08-21 PROCEDURE — 77336 RADIATION PHYSICS CONSULT: CPT | Performed by: RADIOLOGY

## 2023-08-21 PROCEDURE — 77386: CPT | Performed by: RADIOLOGY

## 2023-08-21 PROCEDURE — 77014 CHG CT GUIDANCE RADIATION THERAPY FLDS PLACEMENT: CPT | Performed by: RADIOLOGY

## 2023-08-21 RX ORDER — FLUCONAZOLE 100 MG/1
TABLET ORAL
Qty: 8 TABLET | Refills: 0 | Status: SHIPPED | OUTPATIENT
Start: 2023-08-21 | End: 2023-08-21 | Stop reason: SDUPTHER

## 2023-08-21 RX ORDER — FLUCONAZOLE 100 MG/1
TABLET ORAL
Qty: 8 TABLET | Refills: 0 | Status: SHIPPED | OUTPATIENT
Start: 2023-08-21 | End: 2023-08-28

## 2023-08-22 ENCOUNTER — HOSPITAL ENCOUNTER (OUTPATIENT)
Dept: RADIATION ONCOLOGY | Facility: HOSPITAL | Age: 58
Discharge: HOME | End: 2023-08-22

## 2023-08-22 ENCOUNTER — HOSPITAL ENCOUNTER (OUTPATIENT)
Dept: RADIATION ONCOLOGY | Facility: HOSPITAL | Age: 58
Discharge: HOME | End: 2023-08-22
Payer: COMMERCIAL

## 2023-08-22 VITALS
DIASTOLIC BLOOD PRESSURE: 62 MMHG | SYSTOLIC BLOOD PRESSURE: 100 MMHG | TEMPERATURE: 97.8 F | HEART RATE: 98 BPM | BODY MASS INDEX: 21.69 KG/M2 | WEIGHT: 134.48 LBS | OXYGEN SATURATION: 96 %

## 2023-08-22 DIAGNOSIS — C32.1 SQUAMOUS CELL CARCINOMA OF EPIGLOTTIS: Primary | ICD-10-CM

## 2023-08-22 LAB
RAD ONC ARIA COURSE ID: NORMAL
RAD ONC ARIA COURSE INTENT: NORMAL
RAD ONC ARIA COURSE LAST TREATMENT DATE: NORMAL
RAD ONC ARIA COURSE START DATE: NORMAL
RAD ONC ARIA COURSE TREATMENT ELAPSED DAYS: 50
RAD ONC ARIA FIRST TREATMENT DATE: NORMAL
RAD ONC ARIA PLAN FRACTIONS TREATED TO DATE: 1
RAD ONC ARIA PLAN ID: NORMAL
RAD ONC ARIA PLAN PRESCRIBED DOSE PER FRACTION: 2 GY
RAD ONC ARIA PLAN PRIMARY REFERENCE POINT: NORMAL
RAD ONC ARIA PLAN TOTAL FRACTIONS PRESCRIBED: 10
RAD ONC ARIA PLAN TOTAL PRESCRIBED DOSE: 2000 CGY
RAD ONC ARIA REFERENCE POINT DOSAGE GIVEN TO DATE: 52 GY
RAD ONC ARIA REFERENCE POINT ID: NORMAL
RAD ONC ARIA REFERENCE POINT SESSION DOSAGE GIVEN: 2 GY

## 2023-08-22 PROCEDURE — 77300 RADIATION THERAPY DOSE PLAN: CPT | Performed by: RADIOLOGY

## 2023-08-22 PROCEDURE — 77301 RADIOTHERAPY DOSE PLAN IMRT: CPT | Performed by: RADIOLOGY

## 2023-08-22 PROCEDURE — 77386: CPT | Performed by: RADIOLOGY

## 2023-08-22 PROCEDURE — 77338 DESIGN MLC DEVICE FOR IMRT: CPT | Performed by: RADIOLOGY

## 2023-08-22 NOTE — PROGRESS NOTES
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  She is receiving radiation therapy to the head and neck. She  has received 5000 cGy in 25 fractions out of a planned dose of 7000 cGy in 35 fractions.    Persistent dysgeusia but still eating some.  Using tube as advised.  Oral cavity discomfort about the same; taking Diflucan as directed    Review of Systems:   Review of Systems   Constitutional:  Positive for appetite change (taking in very little by mouth- states that when she eats/drinks anything by mouth it makes her cough.  Taking in 2 cans per PEG daily.), fatigue (8/10) and unexpected weight change (down 13 lbs in one month.  Has lost 8 lbs in the last two weeks.).        Enteral nutrition 2-3 cans per day.  Encouraged increasing peg tube feeds to recommended amount of 5/day.  Explained to the patient the importance of thickening liquids and pureed foods per recommendations.       HENT:  Positive for rhinorrhea (Yellow/white/clear), sore throat (states that her throat is sore and is unable to eat due to this), tinnitus (Occasional, ongoing), trouble swallowing (states that she coughs with any oral intake) and voice change (hoarseness). Negative for ear pain.         States feels like her ears are dry and itchy.  C/o dry mouth   Loss of taste   C/o thick secretions/saliva  Currently has thrush       Respiratory:  Positive for cough (productive cough, with green secretions) and shortness of breath (with exertion).    Cardiovascular:  Positive for leg swelling (Ongoing). Negative for chest pain and palpitations.   Gastrointestinal:  Positive for constipation (waxes and wanes- educated on use of stool softener) and nausea (frequently.  Taking Zofran with relief). Negative for anal  bleeding, blood in stool, diarrhea and vomiting.   Genitourinary:  Positive for difficulty urinating (occasional hesitancy) and frequency (ongoing). Negative for dysuria and urgency.             Musculoskeletal:  Positive for arthralgias (States that her leg pain has worsened.), back pain, neck pain and neck stiffness (on left side behind ear).   Skin:  Positive for color change (hyperpigmentation noted to neck), rash (Noted on arms, started last week) and wound (Noted to bilateral legs.).        Wound (diabetic Ulcer BLE)   Neurological:  Positive for weakness (very lethargic--keeps falling asleep when asking questions/during conversation), light-headedness and headaches (slight headache in the a.m.).        Lethargic, nodding off during visit     Psychiatric/Behavioral:  Positive for sleep disturbance (ongoing).      Vitals:     Vitals:    08/22/23 1001   BP: 100/62   Pulse: 98   Temp: 97.8 øF (36.6 øC)   SpO2: 96%       Weight:   Wt Readings from Last 3 Encounters:   08/22/23 61 kg (134 lb 7.7 oz)   08/18/23 60 kg (132 lb 4.4 oz)   08/15/23 61.2 kg (134 lb 14.7 oz)      Pain:    Pain Score    08/22/23 1001   PainSc:   7   PainLoc: Neck           Physical Exam:  Gen: Appears fatigued and somewhat somnolent  HEENT: MMM; mild anterior neck hyperpigmentation with some early desquamation at left neck  Trachea: midline  Abdomen: Enteral feeding tube insertion site mildly erythematous consistent with local irritation  Chest: symmetric  Resp: normal respiratory effort  Extr: warm, well-perfused  Neuro: awake and alert; no aphasia or neglect    Plan: I have reviewed treatment setup notes, checked and approved the daily guidance images.  I reviewed dose delivery, treatment parameters and deemed them appropriate. We plan to continue radiation therapy as prescribed.        Radiation Oncology    Electronically signed by Goyo Nunez MD 8/22/2023  10:29 EDT

## 2023-08-24 ENCOUNTER — HOSPITAL ENCOUNTER (OUTPATIENT)
Dept: RADIATION ONCOLOGY | Facility: HOSPITAL | Age: 58
Discharge: HOME | End: 2023-08-24
Payer: COMMERCIAL

## 2023-08-24 VITALS — WEIGHT: 133.38 LBS | BODY MASS INDEX: 21.51 KG/M2

## 2023-08-24 LAB
RAD ONC ARIA COURSE ID: NORMAL
RAD ONC ARIA COURSE INTENT: NORMAL
RAD ONC ARIA COURSE LAST TREATMENT DATE: NORMAL
RAD ONC ARIA COURSE START DATE: NORMAL
RAD ONC ARIA COURSE TREATMENT ELAPSED DAYS: 52
RAD ONC ARIA FIRST TREATMENT DATE: NORMAL
RAD ONC ARIA PLAN FRACTIONS TREATED TO DATE: 2
RAD ONC ARIA PLAN ID: NORMAL
RAD ONC ARIA PLAN PRESCRIBED DOSE PER FRACTION: 2 GY
RAD ONC ARIA PLAN PRIMARY REFERENCE POINT: NORMAL
RAD ONC ARIA PLAN TOTAL FRACTIONS PRESCRIBED: 10
RAD ONC ARIA PLAN TOTAL PRESCRIBED DOSE: 2000 CGY
RAD ONC ARIA REFERENCE POINT DOSAGE GIVEN TO DATE: 54 GY
RAD ONC ARIA REFERENCE POINT ID: NORMAL
RAD ONC ARIA REFERENCE POINT SESSION DOSAGE GIVEN: 2 GY

## 2023-08-24 PROCEDURE — 77386: CPT | Performed by: RADIOLOGY

## 2023-08-24 PROCEDURE — 77014 CHG CT GUIDANCE RADIATION THERAPY FLDS PLACEMENT: CPT | Performed by: RADIOLOGY

## 2023-08-24 NOTE — PROGRESS NOTES
Outpatient Nutrition Oncology Follow Up    Patient Name: Isha Llanes  YOB: 1965  MRN: [id number]    Recommendation(s):   Continue advancing to goal of 5 total cans of Boost VHC daily via gravity administration (small / frequent increments as needed) + 1050 mL FWF daily.  PO feeds as tolerated / per speech therapy recommendations.      Reason for Consult: EN F/U       Today's Weight:  60.5 kg    Weight Change: 1 # loss in the past week; 9.7% total decline in 1 month    Estimated nutritional needs (daily):  2448 kcals, 122 gm protein, 4858-5455 mL fluid    Nutrition-related concerns: Early Satiety, Dysphagia/odynophagia, Mucositis/stomatitis, Taste Alterations, Xerostomia, and Other: thick secretions    Current PO intake:  minimal if any PO intake (per speech therapy recommendations 8/17/23:  nectar thickened liquids and pur‚e solids as tolerated).      Current EN RX:  (Gravity feeds) Boost VHC, 3 total cans daily (provides 1590 kcals, 66 gm protein daily)    Consult or Intervention:  Pt reports advancing to 3 total cans/day of her formula (Boost VHC).  Reports fair tolerance due to now experiencing some gas with feeds.  Encouraged pt to take Gas-ex (per nursing staff recommendations).  She reports she cannot afford to purchase Gas-ex at this time.  Encouraged pt to take out the plunger of the syringe when administering h20 flushes- and let the water drip to gravity to decrease air.  Pt v/u.    Nutrition Diagnosis: Severe malnutrition related to Inability to consume sufficient energy as evidenced by physiological causes increasing nutrient needs., hypermetabolic state., inadequate energy intake., patient report., and inadequate EN.    Plan: Will follow up per oncology nutrition protocol

## 2023-08-25 DIAGNOSIS — C32.1 SQUAMOUS CELL CARCINOMA OF EPIGLOTTIS: ICD-10-CM

## 2023-08-28 ENCOUNTER — HOSPITAL ENCOUNTER (OUTPATIENT)
Dept: RADIATION ONCOLOGY | Facility: HOSPITAL | Age: 58
Discharge: HOME | End: 2023-08-28
Payer: COMMERCIAL

## 2023-08-28 DIAGNOSIS — C32.1 SQUAMOUS CELL CARCINOMA OF EPIGLOTTIS: ICD-10-CM

## 2023-08-28 DIAGNOSIS — F41.9 ANXIETY: Primary | ICD-10-CM

## 2023-08-28 DIAGNOSIS — J44.9 CHRONIC OBSTRUCTIVE PULMONARY DISEASE, UNSPECIFIED COPD TYPE: ICD-10-CM

## 2023-08-28 LAB
RAD ONC ARIA COURSE ID: NORMAL
RAD ONC ARIA COURSE INTENT: NORMAL
RAD ONC ARIA COURSE LAST TREATMENT DATE: NORMAL
RAD ONC ARIA COURSE START DATE: NORMAL
RAD ONC ARIA COURSE TREATMENT ELAPSED DAYS: 56
RAD ONC ARIA FIRST TREATMENT DATE: NORMAL
RAD ONC ARIA PLAN FRACTIONS TREATED TO DATE: 3
RAD ONC ARIA PLAN ID: NORMAL
RAD ONC ARIA PLAN PRESCRIBED DOSE PER FRACTION: 2 GY
RAD ONC ARIA PLAN PRIMARY REFERENCE POINT: NORMAL
RAD ONC ARIA PLAN TOTAL FRACTIONS PRESCRIBED: 10
RAD ONC ARIA PLAN TOTAL PRESCRIBED DOSE: 2000 CGY
RAD ONC ARIA REFERENCE POINT DOSAGE GIVEN TO DATE: 56 GY
RAD ONC ARIA REFERENCE POINT ID: NORMAL
RAD ONC ARIA REFERENCE POINT SESSION DOSAGE GIVEN: 2 GY

## 2023-08-28 PROCEDURE — 77386: CPT | Performed by: RADIOLOGY

## 2023-08-28 PROCEDURE — 77014 CHG CT GUIDANCE RADIATION THERAPY FLDS PLACEMENT: CPT | Performed by: RADIOLOGY

## 2023-08-28 RX ORDER — ALBUTEROL SULFATE 90 UG/1
AEROSOL, METERED RESPIRATORY (INHALATION)
Qty: 18 G | Refills: 2 | Status: SHIPPED | OUTPATIENT
Start: 2023-08-28

## 2023-08-28 RX ORDER — ONDANSETRON 4 MG/1
4 TABLET, ORALLY DISINTEGRATING ORAL EVERY 8 HOURS PRN
Qty: 30 TABLET | Refills: 0 | Status: SHIPPED | OUTPATIENT
Start: 2023-08-28

## 2023-08-28 RX ORDER — ALPRAZOLAM 0.5 MG/1
0.5 TABLET ORAL NIGHTLY PRN
Qty: 15 TABLET | Refills: 0 | Status: SHIPPED | OUTPATIENT
Start: 2023-08-28

## 2023-08-29 ENCOUNTER — HOSPITAL ENCOUNTER (OUTPATIENT)
Dept: RADIATION ONCOLOGY | Facility: HOSPITAL | Age: 58
Discharge: HOME | End: 2023-08-29
Payer: COMMERCIAL

## 2023-08-29 ENCOUNTER — HOSPITAL ENCOUNTER (OUTPATIENT)
Dept: RADIATION ONCOLOGY | Facility: HOSPITAL | Age: 58
Discharge: HOME | End: 2023-08-29

## 2023-08-29 ENCOUNTER — TELEPHONE (OUTPATIENT)
Dept: FAMILY MEDICINE CLINIC | Facility: CLINIC | Age: 58
End: 2023-08-29
Payer: COMMERCIAL

## 2023-08-29 VITALS
DIASTOLIC BLOOD PRESSURE: 67 MMHG | SYSTOLIC BLOOD PRESSURE: 94 MMHG | BODY MASS INDEX: 20.98 KG/M2 | TEMPERATURE: 96.9 F | WEIGHT: 130.07 LBS | OXYGEN SATURATION: 99 % | HEART RATE: 86 BPM

## 2023-08-29 DIAGNOSIS — C32.1 SQUAMOUS CELL CARCINOMA OF EPIGLOTTIS: Primary | ICD-10-CM

## 2023-08-29 LAB
RAD ONC ARIA COURSE ID: NORMAL
RAD ONC ARIA COURSE INTENT: NORMAL
RAD ONC ARIA COURSE LAST TREATMENT DATE: NORMAL
RAD ONC ARIA COURSE START DATE: NORMAL
RAD ONC ARIA COURSE TREATMENT ELAPSED DAYS: 57
RAD ONC ARIA FIRST TREATMENT DATE: NORMAL
RAD ONC ARIA PLAN FRACTIONS TREATED TO DATE: 4
RAD ONC ARIA PLAN ID: NORMAL
RAD ONC ARIA PLAN PRESCRIBED DOSE PER FRACTION: 2 GY
RAD ONC ARIA PLAN PRIMARY REFERENCE POINT: NORMAL
RAD ONC ARIA PLAN TOTAL FRACTIONS PRESCRIBED: 10
RAD ONC ARIA PLAN TOTAL PRESCRIBED DOSE: 2000 CGY
RAD ONC ARIA REFERENCE POINT DOSAGE GIVEN TO DATE: 58 GY
RAD ONC ARIA REFERENCE POINT ID: NORMAL
RAD ONC ARIA REFERENCE POINT SESSION DOSAGE GIVEN: 2 GY

## 2023-08-29 PROCEDURE — 77014 CHG CT GUIDANCE RADIATION THERAPY FLDS PLACEMENT: CPT | Performed by: RADIOLOGY

## 2023-08-29 PROCEDURE — 77386: CPT | Performed by: RADIOLOGY

## 2023-08-29 NOTE — PROGRESS NOTES
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  She is receiving radiation therapy to the head and neck. She  has received 5600 cGy in 28 fractions out of a planned dose of 7000 cGy in 35 fractions.    No changes since last evaluated but still experiencing some persistent weight loss.  She has reported to nursing that she is not taking Plavix;     Review of Systems:   Review of Systems   Constitutional:  Positive for appetite change (taking in very little by mouth- states that when she eats/drinks anything by mouth it will occasionally make her cough. Taking in 2 cans per PEG daily.), fatigue (7/10) and unexpected weight change (Down 4lbs in the last week.).        Enteral nutrition 2-3 cans per day.  Encouraged increasing peg tube feeds to recommended amount of 5/day.  Explained to the patient the importance of thickening liquids and pureed foods per recommendations.       HENT:  Positive for rhinorrhea (Yellow/white/clear), tinnitus (Occasional, ongoing), trouble swallowing (Occasional) and voice change (hoarseness). Negative for sore throat.         States feels like her ears are dry and itchy.  C/o dry mouth   Loss of taste   C/o thick secretions/saliva       Respiratory:  Positive for cough (productive cough, with yellow secretions), choking (Occasionally noted with oral intake.  Patient has been instructed to drink thickened liquids and pureed foods per speech therapy.) and shortness of breath (with exertion).    Cardiovascular:  Positive for leg swelling (Ongoing). Negative for chest pain and palpitations.   Gastrointestinal:  Positive for diarrhea (Noted today x2, taken imodium) and nausea (frequently.  Taking Zofran with relief). Negative for anal bleeding, blood in stool,  constipation and vomiting.   Genitourinary:  Positive for difficulty urinating (occasional hesitancy) and frequency (ongoing). Negative for dysuria and urgency.             Musculoskeletal:  Positive for arthralgias (States that her leg pain has worsened.), back pain, neck pain and neck stiffness (on left side behind ear).   Skin:  Positive for color change (hyperpigmentation and peeling noted to neck), rash (Noted on arms, started last week) and wound (Noted to bilateral legs.).        Wound (diabetic Ulcer BLE)   Neurological:  Positive for weakness (very lethargic--keeps falling asleep when asking questions/during conversation), light-headedness and headaches (slight headache in the a.m.).        Lethargic, nodding off during visit     Psychiatric/Behavioral:  Positive for sleep disturbance (ongoing).      Vitals:     Vitals:    08/29/23 0952   BP: 94/67   Pulse: 86   Temp: 96.9 øF (36.1 øC)   SpO2: 99%       Weight:   Wt Readings from Last 3 Encounters:   08/29/23 59 kg (130 lb 1.1 oz)   08/24/23 60.5 kg (133 lb 6.1 oz)   08/22/23 61 kg (134 lb 7.7 oz)      Pain:    Pain Score    08/29/23 0952   PainSc:   7   PainLoc: Generalized           Physical Exam:  Gen: Appears fatigued and somewhat somnolent  HEENT: MMM; mild anterior neck hyperpigmentation  Trachea: midline  Chest: symmetric  Resp: normal respiratory effort  Extr: warm, well-perfused  Neuro: awake and alert; no aphasia or neglect    Plan: I have reviewed treatment setup notes, checked and approved the daily guidance images.  I reviewed dose delivery, treatment parameters and deemed them appropriate. We plan to continue radiation therapy as prescribed.    Will investigate Plavix prescription and her access to it      Radiation Oncology    Electronically signed by Goyo Nunez MD 8/29/2023  10:47 EDT

## 2023-08-29 NOTE — TELEPHONE ENCOUNTER
Kiko Luevano had me call the patient and see if she ever went back on her plavix. Pt stated she didn't know that she was supposed to go back on it after her procedure. I advised that he wants her to start taking it again. I asked if she needed a refill and she stated she has some. Patient verbalized understanding.

## 2023-08-29 NOTE — PROGRESS NOTES
During weekly rounds today, patient informed me that she has not been taking her plavix, she states that after her procedure, nobody had given her instruction on when to restart.  I asked patient who had prescribed this med, she stated her PCP.  I instructed patient how dangerous it was that she has not been taking it and that she needed to contact her PCP about restarting.    I contacted patients PCP and spoke to LUZ Amato.  I informed him of what patient had stated.  He verbalized understanding and is to follow up with patient.

## 2023-08-30 ENCOUNTER — HOSPITAL ENCOUNTER (OUTPATIENT)
Dept: RADIATION ONCOLOGY | Facility: HOSPITAL | Age: 58
Discharge: HOME | End: 2023-08-30
Payer: COMMERCIAL

## 2023-08-30 VITALS — BODY MASS INDEX: 20.69 KG/M2 | WEIGHT: 128.31 LBS

## 2023-08-30 LAB
RAD ONC ARIA COURSE ID: NORMAL
RAD ONC ARIA COURSE INTENT: NORMAL
RAD ONC ARIA COURSE LAST TREATMENT DATE: NORMAL
RAD ONC ARIA COURSE START DATE: NORMAL
RAD ONC ARIA COURSE TREATMENT ELAPSED DAYS: 58
RAD ONC ARIA FIRST TREATMENT DATE: NORMAL
RAD ONC ARIA PLAN FRACTIONS TREATED TO DATE: 5
RAD ONC ARIA PLAN ID: NORMAL
RAD ONC ARIA PLAN PRESCRIBED DOSE PER FRACTION: 2 GY
RAD ONC ARIA PLAN PRIMARY REFERENCE POINT: NORMAL
RAD ONC ARIA PLAN TOTAL FRACTIONS PRESCRIBED: 10
RAD ONC ARIA PLAN TOTAL PRESCRIBED DOSE: 2000 CGY
RAD ONC ARIA REFERENCE POINT DOSAGE GIVEN TO DATE: 60 GY
RAD ONC ARIA REFERENCE POINT ID: NORMAL
RAD ONC ARIA REFERENCE POINT SESSION DOSAGE GIVEN: 2 GY

## 2023-08-30 PROCEDURE — 77386: CPT | Performed by: RADIOLOGY

## 2023-08-30 PROCEDURE — 77336 RADIATION PHYSICS CONSULT: CPT | Performed by: RADIOLOGY

## 2023-08-30 PROCEDURE — 77014 CHG CT GUIDANCE RADIATION THERAPY FLDS PLACEMENT: CPT | Performed by: RADIOLOGY

## 2023-08-30 NOTE — PROGRESS NOTES
Outpatient Nutrition Oncology Follow Up    Patient Name: Isha Llanes  YOB: 1965  MRN: [id number]    Recommendation(s):   PO feeds as tolerated (per speech therapy recommendations:  nectar thickened liquids & pureed solids))  Primary nutrition from PEG- tolerating Peptamen 1.5 better than Boost VHC, will change order    Order change:  Peptamen 1.5, a goal of 6.5 total cans daily + additional 120 mL h20 flush given 6 X day.      Reason for Consult: EN F/U, Radiation tx f/u       Today's Weight:  58.2 kg    Weight Change:  3.8% loss in 1 week; total 13% loss in 1 month    Estimated nutritional needs (daily):  2448 kcals, 122 gm protein, 5804-4664 mL fluid     Nutrition-related concerns: Nausea, Diarrhea, Anorexia, Early Satiety, Dysphagia/odynophagia, Taste Alterations, and Xerostomia    Current PO intake:  Eating regular, solid food at times.  Reports some episodes of choking that is delayed after eating.  Speech pathology recommendations from 8/17/23:  nectar thickened liquids & pureed solids.    Current EN RX:  Gravity feeds, current EN formula Boost VHC.  Did not give as many cans as usual yesterday- reports did not feel well and was tired.  Pt has been administering 3 total cans/day.    Consult or Intervention:  During weekly status check pt reported nausea & diarrhea- she is unsure of the cause of diarrhea, as her family members had a GI virus.  Yesterday pt reports she feels the current formula (VHC) is aggravating nausea.  Pt reported today that the nausea with her formula began late last week and has become worse.  RN in radiation oncology provided pt with alternate formula samples- Peptamen 1.5.  Pt reports she hasn't tried yet due to not feeling well / fatigued last night.  Explained importance of doing a trial of the Peptamen 1.5 and to report tolerance or intolerance tomorrow.  Explained EN formula can be changed if tolerates better than the VHC.  Pt requested more Enfit syringes-  provided 3 to her.  Provided samples of Thick-It for her to thicken her liquids to nectar consistency.    Nutrition Diagnosis: Severe malnutrition related to Inability to consume sufficient energy as evidenced by physiological causes increasing nutrient needs., hypermetabolic state., muscle wasting., fat loss., inadequate energy intake., and patient report. 13% wt decline in 1 month, <50% EEE X 1 month.    Plan: Will follow up per oncology nutrition protocol      Addendum:  Pt reports trying the Peptamen 1.5 and reports improvement in tolerance- diarrhea / BM's were not as loose.  She is requesting to change the formula.

## 2023-08-31 ENCOUNTER — HOSPITAL ENCOUNTER (OUTPATIENT)
Dept: RADIATION ONCOLOGY | Facility: HOSPITAL | Age: 58
Discharge: HOME | End: 2023-08-31
Payer: COMMERCIAL

## 2023-08-31 LAB
RAD ONC ARIA COURSE ID: NORMAL
RAD ONC ARIA COURSE INTENT: NORMAL
RAD ONC ARIA COURSE LAST TREATMENT DATE: NORMAL
RAD ONC ARIA COURSE START DATE: NORMAL
RAD ONC ARIA COURSE TREATMENT ELAPSED DAYS: 59
RAD ONC ARIA FIRST TREATMENT DATE: NORMAL
RAD ONC ARIA PLAN FRACTIONS TREATED TO DATE: 6
RAD ONC ARIA PLAN ID: NORMAL
RAD ONC ARIA PLAN PRESCRIBED DOSE PER FRACTION: 2 GY
RAD ONC ARIA PLAN PRIMARY REFERENCE POINT: NORMAL
RAD ONC ARIA PLAN TOTAL FRACTIONS PRESCRIBED: 10
RAD ONC ARIA PLAN TOTAL PRESCRIBED DOSE: 2000 CGY
RAD ONC ARIA REFERENCE POINT DOSAGE GIVEN TO DATE: 62 GY
RAD ONC ARIA REFERENCE POINT ID: NORMAL
RAD ONC ARIA REFERENCE POINT SESSION DOSAGE GIVEN: 2 GY

## 2023-08-31 PROCEDURE — 77386: CPT | Performed by: RADIOLOGY

## 2023-08-31 PROCEDURE — 77427 RADIATION TX MANAGEMENT X5: CPT | Performed by: RADIOLOGY

## 2023-08-31 NOTE — PROGRESS NOTES
Please refer to Oncology RD documentation from 8/31/23.  (DME Orders Only)    Addendum:    Reason: Vendor documentation     Vendor: [employer]    Note: Order for EN formula change faxed to [employer] [facility] [place] via [employer]- fax confirmation was received.    Electronically signed by:  Cynthia Fonseca RD  08/31/23 12:37 EDT

## 2023-09-01 ENCOUNTER — HOSPITAL ENCOUNTER (OUTPATIENT)
Dept: RADIATION ONCOLOGY | Facility: HOSPITAL | Age: 58
Setting detail: RADIATION/ONCOLOGY SERIES
End: 2023-09-01
Payer: COMMERCIAL

## 2023-09-01 DIAGNOSIS — C32.1 SQUAMOUS CELL CARCINOMA OF EPIGLOTTIS: Primary | ICD-10-CM

## 2023-09-05 ENCOUNTER — TELEPHONE (OUTPATIENT)
Dept: RADIATION ONCOLOGY | Facility: HOSPITAL | Age: 58
End: 2023-09-05
Payer: COMMERCIAL

## 2023-09-05 ENCOUNTER — HOSPITAL ENCOUNTER (OUTPATIENT)
Dept: RADIATION ONCOLOGY | Facility: HOSPITAL | Age: 58
Discharge: HOME | End: 2023-09-05

## 2023-09-05 ENCOUNTER — HOSPITAL ENCOUNTER (OUTPATIENT)
Dept: CT IMAGING | Facility: HOSPITAL | Age: 58
Discharge: HOME | End: 2023-09-05
Admitting: NURSE PRACTITIONER
Payer: COMMERCIAL

## 2023-09-05 ENCOUNTER — HOSPITAL ENCOUNTER (OUTPATIENT)
Dept: RADIATION ONCOLOGY | Facility: HOSPITAL | Age: 58
Discharge: HOME | End: 2023-09-05
Payer: COMMERCIAL

## 2023-09-05 VITALS
BODY MASS INDEX: 20.77 KG/M2 | WEIGHT: 128.75 LBS | HEART RATE: 99 BPM | DIASTOLIC BLOOD PRESSURE: 59 MMHG | TEMPERATURE: 97.9 F | SYSTOLIC BLOOD PRESSURE: 89 MMHG | OXYGEN SATURATION: 95 %

## 2023-09-05 DIAGNOSIS — C32.1 SQUAMOUS CELL CARCINOMA OF EPIGLOTTIS: ICD-10-CM

## 2023-09-05 DIAGNOSIS — J18.9 COMMUNITY ACQUIRED PNEUMONIA, UNSPECIFIED LATERALITY: Primary | ICD-10-CM

## 2023-09-05 DIAGNOSIS — C32.1 SQUAMOUS CELL CARCINOMA OF EPIGLOTTIS: Primary | ICD-10-CM

## 2023-09-05 DIAGNOSIS — R06.02 SHORTNESS OF BREATH: ICD-10-CM

## 2023-09-05 DIAGNOSIS — R05.9 COUGH IN ADULT PATIENT: ICD-10-CM

## 2023-09-05 LAB
RAD ONC ARIA COURSE ID: NORMAL
RAD ONC ARIA COURSE INTENT: NORMAL
RAD ONC ARIA COURSE LAST TREATMENT DATE: NORMAL
RAD ONC ARIA COURSE START DATE: NORMAL
RAD ONC ARIA COURSE TREATMENT ELAPSED DAYS: 64
RAD ONC ARIA FIRST TREATMENT DATE: NORMAL
RAD ONC ARIA PLAN FRACTIONS TREATED TO DATE: 7
RAD ONC ARIA PLAN ID: NORMAL
RAD ONC ARIA PLAN PRESCRIBED DOSE PER FRACTION: 2 GY
RAD ONC ARIA PLAN PRIMARY REFERENCE POINT: NORMAL
RAD ONC ARIA PLAN TOTAL FRACTIONS PRESCRIBED: 10
RAD ONC ARIA PLAN TOTAL PRESCRIBED DOSE: 2000 CGY
RAD ONC ARIA REFERENCE POINT DOSAGE GIVEN TO DATE: 64 GY
RAD ONC ARIA REFERENCE POINT ID: NORMAL
RAD ONC ARIA REFERENCE POINT SESSION DOSAGE GIVEN: 2 GY

## 2023-09-05 PROCEDURE — 71250 CT THORAX DX C-: CPT

## 2023-09-05 PROCEDURE — 77386: CPT | Performed by: RADIOLOGY

## 2023-09-05 PROCEDURE — 77014 CHG CT GUIDANCE RADIATION THERAPY FLDS PLACEMENT: CPT | Performed by: RADIOLOGY

## 2023-09-05 RX ORDER — LEVOFLOXACIN 750 MG/1
750 TABLET ORAL DAILY
Qty: 7 TABLET | Refills: 0 | Status: SHIPPED | OUTPATIENT
Start: 2023-09-05 | End: 2023-09-12

## 2023-09-05 NOTE — PROGRESS NOTES
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  She is receiving radiation therapy to the head and neck. She  has received 6400 cGy in 32 fractions out of a planned dose of 7000 cGy in 35 fractions.    New onset nasal congestion and increased work of breathing that is mild    Review of Systems:   Review of Systems   Constitutional:  Positive for appetite change (taking in very little by mouth- states that when she eats/drinks anything by mouth it will occasionally make her cough. Taking in 2 cans per PEG daily.), fatigue (7/10) and unexpected weight change (Down 4lbs in the last week.).        Enteral nutrition 2-3 cans per day.  Encouraged increasing peg tube feeds to recommended amount of 5/day.  Explained to the patient the importance of thickening liquids and pureed foods per recommendations.       HENT:  Positive for congestion, rhinorrhea (Yellow/white/clear), sinus pain, tinnitus (Occasional, ongoing), trouble swallowing (Occasional) and voice change (hoarseness). Negative for sore throat.         States feels like her ears are dry and itchy.  C/o dry mouth   Loss of taste   C/o thick secretions/saliva       Respiratory:  Positive for cough (productive cough, with yellow secretions), choking (Occasionally noted with oral intake.  Patient has been instructed to drink thickened liquids and pureed foods per speech therapy.) and shortness of breath (with exertion).    Cardiovascular:  Positive for chest pain and leg swelling (Ongoing). Negative for palpitations.   Gastrointestinal:  Positive for diarrhea (Noted today x2, taken imodium) and nausea (frequently.  Taking Zofran with relief). Negative for anal bleeding, blood in stool, constipation and vomiting.   Genitourinary:  Positive  for difficulty urinating (occasional hesitancy) and frequency (ongoing). Negative for dysuria and urgency.             Musculoskeletal:  Positive for arthralgias (States that her leg pain has worsened.), back pain, neck pain and neck stiffness (on left side behind ear).   Skin:  Positive for color change (hyperpigmentation and peeling noted to neck), rash (Noted on arms, started last week) and wound (Noted to bilateral legs.).        Wound (diabetic Ulcer BLE)   Neurological:  Positive for weakness (very lethargic--keeps falling asleep when asking questions/during conversation), light-headedness and headaches (slight headache in the a.m.).        Lethargic, nodding off during visit     Psychiatric/Behavioral:  Positive for sleep disturbance (ongoing).      Vitals:     Vitals:    09/05/23 1014   BP: (!) 89/59   Pulse: 99   Temp: 97.9 °F (36.6 °C)   SpO2: 95%       Weight:   Wt Readings from Last 3 Encounters:   09/05/23 58.4 kg (128 lb 12 oz)   08/30/23 58.2 kg (128 lb 4.9 oz)   08/29/23 59 kg (130 lb 1.1 oz)      Pain:    Pain Score    09/05/23 1014   PainSc:   8   PainLoc: Generalized           Physical Exam:  Gen: Appears fatigued and somewhat somnolent  HEENT: MMM; moderate anterior neck hyperpigmentation  Trachea: midline  Chest: symmetric  Resp: normal respiratory effort  Extr: warm, well-perfused  Neuro: awake and alert; no aphasia or neglect    Plan: I have reviewed treatment setup notes, checked and approved the daily guidance images.  I reviewed dose delivery, treatment parameters and deemed them appropriate. We plan to continue radiation therapy as prescribed.    Chest x-ray today      Radiation Oncology    Electronically signed by Goyo Nunez MD 9/5/2023  10:46 EDT

## 2023-09-05 NOTE — TELEPHONE ENCOUNTER
"Call placed to Ms. Llanes to let her know that per her CT scan she does have beginning of pneumonia.  Nati Nicole, NP ask that I call and let her know this and let her know that Levaquin has been called into her pharmacy.    No answer, left message on voicemail stating the above.  Also let her know that per Cynthia the dietician \" [employer] (Ms. Llanes's vendor) they still have not process the order change for her formula. Until they do, she can have whatever is left of the formula (Peptamen 1.5) that is in dosimetry\".   Instructed to call our office back when message received.   "

## 2023-09-06 ENCOUNTER — HOSPITAL ENCOUNTER (OUTPATIENT)
Dept: RADIATION ONCOLOGY | Facility: HOSPITAL | Age: 58
Discharge: HOME | End: 2023-09-06
Payer: COMMERCIAL

## 2023-09-06 VITALS — WEIGHT: 127.27 LBS | BODY MASS INDEX: 20.53 KG/M2

## 2023-09-06 LAB
RAD ONC ARIA COURSE ID: NORMAL
RAD ONC ARIA COURSE INTENT: NORMAL
RAD ONC ARIA COURSE LAST TREATMENT DATE: NORMAL
RAD ONC ARIA COURSE START DATE: NORMAL
RAD ONC ARIA COURSE TREATMENT ELAPSED DAYS: 65
RAD ONC ARIA FIRST TREATMENT DATE: NORMAL
RAD ONC ARIA PLAN FRACTIONS TREATED TO DATE: 8
RAD ONC ARIA PLAN ID: NORMAL
RAD ONC ARIA PLAN PRESCRIBED DOSE PER FRACTION: 2 GY
RAD ONC ARIA PLAN PRIMARY REFERENCE POINT: NORMAL
RAD ONC ARIA PLAN TOTAL FRACTIONS PRESCRIBED: 10
RAD ONC ARIA PLAN TOTAL PRESCRIBED DOSE: 2000 CGY
RAD ONC ARIA REFERENCE POINT DOSAGE GIVEN TO DATE: 66 GY
RAD ONC ARIA REFERENCE POINT ID: NORMAL
RAD ONC ARIA REFERENCE POINT SESSION DOSAGE GIVEN: 2 GY

## 2023-09-06 PROCEDURE — 77014 CHG CT GUIDANCE RADIATION THERAPY FLDS PLACEMENT: CPT | Performed by: RADIOLOGY

## 2023-09-06 PROCEDURE — 77386: CPT | Performed by: RADIOLOGY

## 2023-09-06 RX ORDER — INSULIN LISPRO 100 [IU]/ML
INJECTION, SOLUTION INTRAVENOUS; SUBCUTANEOUS
Qty: 15 ML | Refills: 1 | Status: SHIPPED | OUTPATIENT
Start: 2023-09-06

## 2023-09-06 RX ORDER — CETIRIZINE HYDROCHLORIDE 10 MG/1
TABLET ORAL
Qty: 90 TABLET | Refills: 1 | Status: SHIPPED | OUTPATIENT
Start: 2023-09-06

## 2023-09-06 NOTE — PROGRESS NOTES
Outpatient Nutrition Oncology Follow Up    Patient Name: Isha Llanes  YOB: 1965  MRN: [id number]    Recommendation(s):   Peptamen 1.5, gravity feeds of 6.5 total cans/day.  H20 flushes:  120 mL flush given 6 X day.  PO feeds per speech therapy recommendations.      Reason for Consult: EN f/u, Radiation tx f/u       Today's Weight:  57.7 kg    Weight Change: <1% wt decline in the past week (improvement); total 8.3% loss in 1 month    Nutrition-related concerns: Early Satiety, Dysphagia/odynophagia, Taste Alterations, Xerostomia, and Other: thick secretions; occasional nausea or diarrhea with Boost VHC (used as EN formula)    Current PO intake:  regular food consistencies, minimal intake     Current EN RX:  Gravity feeds, has been alternating Boost VHC (initial formula) with Peptamen 1.5 (samples provided from last week); gives 2-3 cans/day on average    Consult or Intervention:  Pt reports she has not had communication from her vendor, [employer], regarding the formula change (order sent last week on 8/31/23).  Oncology RD spoke to Nidia from [facility], yesterday 9/5/23 who indicated they had not processed the order change as of yesterday- therefore had not reached out to pt yet.  Explained urgency of situation to [facility] during our conversation.  Explained above information to pt today.  Provided 6 samples of the Peptamen 1.5 to assist until pt can receive delivery of new formula (Peptamen 1.5).  CT of the chest shows new concern of pna.  Pt is receiving speech therapy through home health now ([facility]).  Radiation Oncology RN to discuss findings of CT with pt today.    Nutrition Diagnosis: Severe malnutrition related to Inability to consume sufficient energy as evidenced by physiological causes increasing nutrient needs., hypermetabolic state., muscle wasting., fat loss., inadequate energy intake., patient report., and 8.3% wt decline in 1 month; <50% EEE X 1  month.    Plan: Will follow up per oncology nutrition protocol      Addendum 9/7/23:  Spoke to pt this morning who stated she still has not received any communication or delivery of new formula from [facility].  Called Nidia, at [facility], again to inquire about the situation.  Nidia stated they are running behind on processing orders.  Nidia will need to send an SMN for MD to sign the order change and will then be able to ship out pt's new formula.  Provided pt with 6 more containers of Peptamen 1.5 today.

## 2023-09-07 ENCOUNTER — HOSPITAL ENCOUNTER (OUTPATIENT)
Dept: RADIATION ONCOLOGY | Facility: HOSPITAL | Age: 58
Discharge: HOME | End: 2023-09-07
Payer: COMMERCIAL

## 2023-09-07 LAB
RAD ONC ARIA COURSE ID: NORMAL
RAD ONC ARIA COURSE INTENT: NORMAL
RAD ONC ARIA COURSE LAST TREATMENT DATE: NORMAL
RAD ONC ARIA COURSE START DATE: NORMAL
RAD ONC ARIA COURSE TREATMENT ELAPSED DAYS: 66
RAD ONC ARIA FIRST TREATMENT DATE: NORMAL
RAD ONC ARIA PLAN FRACTIONS TREATED TO DATE: 9
RAD ONC ARIA PLAN ID: NORMAL
RAD ONC ARIA PLAN PRESCRIBED DOSE PER FRACTION: 2 GY
RAD ONC ARIA PLAN PRIMARY REFERENCE POINT: NORMAL
RAD ONC ARIA PLAN TOTAL FRACTIONS PRESCRIBED: 10
RAD ONC ARIA PLAN TOTAL PRESCRIBED DOSE: 2000 CGY
RAD ONC ARIA REFERENCE POINT DOSAGE GIVEN TO DATE: 68 GY
RAD ONC ARIA REFERENCE POINT ID: NORMAL
RAD ONC ARIA REFERENCE POINT SESSION DOSAGE GIVEN: 2 GY

## 2023-09-07 PROCEDURE — 77014 CHG CT GUIDANCE RADIATION THERAPY FLDS PLACEMENT: CPT | Performed by: RADIOLOGY

## 2023-09-07 PROCEDURE — 77386: CPT | Performed by: RADIOLOGY

## 2023-09-08 ENCOUNTER — HOSPITAL ENCOUNTER (OUTPATIENT)
Dept: RADIATION ONCOLOGY | Facility: HOSPITAL | Age: 58
Discharge: HOME | End: 2023-09-08
Payer: COMMERCIAL

## 2023-09-08 LAB
RAD ONC ARIA COURSE ID: NORMAL
RAD ONC ARIA COURSE INTENT: NORMAL
RAD ONC ARIA COURSE LAST TREATMENT DATE: NORMAL
RAD ONC ARIA COURSE START DATE: NORMAL
RAD ONC ARIA COURSE TREATMENT ELAPSED DAYS: 67
RAD ONC ARIA FIRST TREATMENT DATE: NORMAL
RAD ONC ARIA PLAN FRACTIONS TREATED TO DATE: 10
RAD ONC ARIA PLAN ID: NORMAL
RAD ONC ARIA PLAN PRESCRIBED DOSE PER FRACTION: 2 GY
RAD ONC ARIA PLAN PRIMARY REFERENCE POINT: NORMAL
RAD ONC ARIA PLAN TOTAL FRACTIONS PRESCRIBED: 10
RAD ONC ARIA PLAN TOTAL PRESCRIBED DOSE: 2000 CGY
RAD ONC ARIA REFERENCE POINT DOSAGE GIVEN TO DATE: 70 GY
RAD ONC ARIA REFERENCE POINT ID: NORMAL
RAD ONC ARIA REFERENCE POINT SESSION DOSAGE GIVEN: 2 GY

## 2023-09-08 PROCEDURE — 77336 RADIATION PHYSICS CONSULT: CPT | Performed by: RADIOLOGY

## 2023-09-08 PROCEDURE — 77386: CPT | Performed by: RADIOLOGY

## 2023-09-08 PROCEDURE — 77014 CHG CT GUIDANCE RADIATION THERAPY FLDS PLACEMENT: CPT | Performed by: RADIOLOGY

## 2023-09-11 DIAGNOSIS — C32.1 SQUAMOUS CELL CARCINOMA OF EPIGLOTTIS: Primary | ICD-10-CM

## 2023-09-11 RX ORDER — HYDROCODONE BITARTRATE AND ACETAMINOPHEN 7.5; 325 MG/1; MG/1
1 TABLET ORAL EVERY 6 HOURS PRN
Qty: 40 TABLET | Refills: 0 | Status: SHIPPED | OUTPATIENT
Start: 2023-09-11

## 2023-09-13 ENCOUNTER — TELEPHONE (OUTPATIENT)
Dept: NUTRITION | Facility: HOSPITAL | Age: 58
End: 2023-09-13
Payer: COMMERCIAL

## 2023-09-13 NOTE — PROGRESS NOTES
9/13/23:    Received a phone call from pt indicating she has not received her new EN formula.  Order for EN formula change was sent to her vendor, [employer], on 8/31/23.  Called and spoke to Cristine, with [employer] [facility] [place], who stated pt's formula has been shipped out and she should receive it by tomorrow 9/14/23.  Called pt back and communicated the above information.  She expressed gratitude.  Encouraged pt to reach back out if she does not receive the formula by tomorrow.    Oncology RD remains available per protocol.

## 2023-09-14 NOTE — TELEPHONE ENCOUNTER
Attempted to contact patient to get some clarification and was un able to reach her. It stated that this person was not receiving calls at this time.    Ketoconazole Counseling:   Patient counseled regarding improving absorption with orange juice.  Adverse effects include but are not limited to breast enlargement, headache, diarrhea, nausea, upset stomach, liver function test abnormalities, taste disturbance, and stomach pain.  There is a rare possibility of liver failure that can occur when taking ketoconazole. The patient understands that monitoring of LFTs may be required, especially at baseline. The patient verbalized understanding of the proper use and possible adverse effects of ketoconazole.  All of the patient's questions and concerns were addressed.

## 2023-09-15 DIAGNOSIS — C32.1 SQUAMOUS CELL CARCINOMA OF EPIGLOTTIS: Primary | ICD-10-CM

## 2023-09-15 RX ORDER — LOPERAMIDE HYDROCHLORIDE 2 MG/1
2 CAPSULE ORAL 4 TIMES DAILY PRN
Qty: 40 CAPSULE | Refills: 0 | Status: SHIPPED | OUTPATIENT
Start: 2023-09-15

## 2023-09-15 RX ORDER — ONDANSETRON 4 MG/1
4 TABLET, ORALLY DISINTEGRATING ORAL EVERY 8 HOURS PRN
Qty: 25 TABLET | Refills: 0 | Status: SHIPPED | OUTPATIENT
Start: 2023-09-15

## 2023-09-19 ENCOUNTER — TELEPHONE (OUTPATIENT)
Dept: FAMILY MEDICINE CLINIC | Facility: CLINIC | Age: 58
End: 2023-09-19

## 2023-09-19 NOTE — TELEPHONE ENCOUNTER
Caller: Isha Llanes    Relationship to patient: Self    Best call back number: [phone]    Chief complaint: N/A    Type of visit: NEW PATIENT    Requested date: A WEEK OR TWO AROUND 10 OR 11AM     If rescheduling, when is the original appointment: 09/19/2023     Additional notes:PATIENT WOULD LIKE SOMEONE TO SCHEDULE ANOTHER APPT FOR HER SHE WILL HAVE TO NOTIFY T[employer] FOR TRANSPORTATION. PLEASE JUST SCHEDULE THIS AND CALL HER AND LEAVE MESSAGE WHEN APPT IS DATE AND TIME.

## 2023-09-22 DIAGNOSIS — C32.1 SQUAMOUS CELL CARCINOMA OF EPIGLOTTIS: Primary | ICD-10-CM

## 2023-09-22 RX ORDER — HYDROCODONE BITARTRATE AND ACETAMINOPHEN 10; 325 MG/1; MG/1
1 TABLET ORAL EVERY 6 HOURS PRN
Qty: 50 TABLET | Refills: 0 | Status: SHIPPED | OUTPATIENT
Start: 2023-09-22

## 2023-09-26 ENCOUNTER — TELEPHONE (OUTPATIENT)
Dept: FAMILY MEDICINE CLINIC | Facility: CLINIC | Age: 58
End: 2023-09-26
Payer: COMMERCIAL

## 2023-09-26 NOTE — TELEPHONE ENCOUNTER
Patient is requesting to be seen sooner than 11/7/23. Gilbert told me to ask you if she could be squeezed in somewhere. Please advise.

## 2023-09-27 ENCOUNTER — TELEPHONE (OUTPATIENT)
Dept: FAMILY MEDICINE CLINIC | Facility: CLINIC | Age: 58
End: 2023-09-27
Payer: COMMERCIAL

## 2023-09-27 NOTE — TELEPHONE ENCOUNTER
Called patient and left voicemail that we can get her a sooner appt on Oct 24 at 1245pm. Wanted to know if that works for the patient or not HUB PLEASE READ BACK.

## 2023-09-29 ENCOUNTER — DOCUMENTATION (OUTPATIENT)
Dept: RADIATION ONCOLOGY | Facility: HOSPITAL | Age: 58
End: 2023-09-29
Payer: COMMERCIAL

## 2023-09-29 ENCOUNTER — OFFICE VISIT (OUTPATIENT)
Dept: RADIATION ONCOLOGY | Facility: HOSPITAL | Age: 58
End: 2023-09-29
Payer: COMMERCIAL

## 2023-09-29 VITALS
BODY MASS INDEX: 20.34 KG/M2 | SYSTOLIC BLOOD PRESSURE: 103 MMHG | HEART RATE: 83 BPM | TEMPERATURE: 97.5 F | OXYGEN SATURATION: 96 % | WEIGHT: 126.1 LBS | RESPIRATION RATE: 16 BRPM | DIASTOLIC BLOOD PRESSURE: 47 MMHG

## 2023-09-29 DIAGNOSIS — F41.9 ANXIETY: Primary | ICD-10-CM

## 2023-09-29 DIAGNOSIS — C32.1 MALIGNANT NEOPLASM OF SUPRAGLOTTIS: Primary | ICD-10-CM

## 2023-09-29 DIAGNOSIS — C32.1 SQUAMOUS CELL CARCINOMA OF EPIGLOTTIS: Primary | ICD-10-CM

## 2023-09-29 PROCEDURE — G0463 HOSPITAL OUTPT CLINIC VISIT: HCPCS | Performed by: RADIOLOGY

## 2023-09-29 RX ORDER — FUROSEMIDE 40 MG/1
40 TABLET ORAL DAILY
Qty: 90 TABLET | Refills: 1 | Status: SHIPPED | OUTPATIENT
Start: 2023-09-29

## 2023-09-29 RX ORDER — ALPRAZOLAM 0.5 MG/1
0.5 TABLET ORAL NIGHTLY PRN
Qty: 15 TABLET | Refills: 0 | Status: SHIPPED | OUTPATIENT
Start: 2023-09-29

## 2023-09-29 NOTE — PROGRESS NOTES
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  She reports some persistent neck pain that she believes is getting somewhat worse.  She also describes diffuse pain.  She continues to have anxiety regarding her diagnosis and life stressors.  She is no longer using her enteral feeding tube for nutrition.  Her skin is markedly improved.  Subjective      Review of Systems: Review of Systems   Constitutional:  Positive for appetite change (Decreased, makes self eat.  Occasionally will take feeds through peg tube.), fatigue (9/10) and unexpected weight change (Down 2lbs since the beginning of the month.).   HENT:  Positive for congestion, rhinorrhea (Yellow/white/clear), sinus pain, sore throat (Slight), tinnitus (Occasional, ongoing) and voice change (hoarseness). Negative for trouble swallowing.         States feels like her ears are dry and itchy.  C/o dry mouth   Loss of taste   C/o thick secretions/saliva       Respiratory:  Positive for cough (Occasional productive cough, with yellow secretions) and shortness of breath (with exertion). Negative for choking.         States that sometimes she has difficulty catching her breath, newer.     Cardiovascular:  Positive for leg swelling (Ongoing). Negative for chest pain and palpitations.   Gastrointestinal:  Positive for constipation (Educated on stool softeners/fiber.) and nausea (Frequent). Negative for anal bleeding, blood in stool, diarrhea and vomiting.   Genitourinary:  Positive for difficulty urinating (occasional hesitancy). Negative for dysuria, frequency, hematuria and urgency.   Musculoskeletal:  Positive for arthralgias, back  pain, neck pain and neck stiffness (on left side behind ear).   Skin:  Positive for color change (Hyperpigmentation), rash (Noted on arms) and wound (Noted to bilateral legs.).        Wound (diabetic Ulcer BLE)   Neurological:  Positive for weakness (very lethargic--keeps falling asleep when asking questions/during conversation), light-headedness and headaches (slight headache in the a.m.). Negative for dizziness.        Lethargic, nodding off during visit     Psychiatric/Behavioral:  Positive for sleep disturbance (ongoing).      The following portions of the patient's history were reviewed and updated as appropriate: allergies, current medications, past family history, past medical history, past social history, past surgical history and problem list.    Medications:     Current Outpatient Medications:     Acetaminophen Extra Strength 500 MG tablet, TAKE 2 TO 3 TABLETS BY MOUTH EVERY 6 HOURS AS NEEDED FOR MILD PAIN, Disp: 60 tablet, Rfl: 3    albuterol sulfate  (90 Base) MCG/ACT inhaler, inhale TWO puffs BY MOUTH EVERY 4 HOURS AS NEEDED for wheezing, Disp: 18 g, Rfl: 2    bacitracin 500 UNIT/GM ointment, Apply 1 application  topically to the appropriate area as directed 2 (Two) Times a Day., Disp: 28 g, Rfl: 5    clopidogrel (PLAVIX) 75 MG tablet, Take 1 tablet by mouth Daily., Disp: , Rfl:     furosemide (LASIX) 40 MG tablet, Take 1 tablet by mouth Daily., Disp: 90 tablet, Rfl: 1    gabapentin (NEURONTIN) 300 MG capsule, TAKE ONE CAPSULE BY MOUTH THREE TIMES DAILY (Patient taking differently: Take 1 capsule by mouth 4 (Four) Times a Day As Needed.), Disp: 270 capsule, Rfl: 1    HYDROcodone-acetaminophen (NORCO)  MG per tablet, Take 1 tablet by mouth Every 6 (Six) Hours As Needed for Moderate Pain., Disp: 50 tablet, Rfl: 0    HYDROmorphone (DILAUDID) 1 mg/mL solution, Infuse  into a venous catheter Dose Adjusted By Provider As Needed. PER PAIN PUMP, Disp: , Rfl:     ALPRAZolam (Xanax) 0.5 MG tablet,  "Take 1 tablet by mouth At Night As Needed for Anxiety., Disp: 15 tablet, Rfl: 0    B-D UF III MINI PEN NEEDLES 31G X 5 MM misc, Inject 100 each under the skin into the appropriate area as directed 4 (Four) Times a Day., Disp: 100 each, Rfl: 12    Elastic Bandages & Supports (ACE Elastic Bandage 4\") misc, 1 Device Daily. One on each leg, bilateral.   #24 = one month supply., Disp: 24 each, Rfl: 3    FREESTYLE LITE test strip, by Other route 4 (Four) Times a Day. Use to test blood sugar 4 times daily, Disp: , Rfl:     glucose blood test strip, Use as instructed, Disp: 100 each, Rfl: 12    glucose monitor monitoring kit, 1 each As Needed (Test blood sugar TID)., Disp: 1 each, Rfl: 0    insulin detemir (LEVEMIR) 100 UNIT/ML injection, Inject 10 Units under the skin into the appropriate area as directed Daily for 90 days., Disp: 9 mL, Rfl: 3    Insulin Lispro, 1 Unit Dial, (HUMALOG) 100 UNIT/ML solution pen-injector, INJECT ONE TO 10 UNITS WITH MEALS PER SLIDING SCALE, MAX OF 30 UNITS PER DAY (Patient not taking: Reported on 9/29/2023), Disp: 15 mL, Rfl: 1    Insulin Syringe 31G X 5/16\" 1 ML misc, 1 each Daily. (Patient not taking: Reported on 9/5/2023), Disp: 100 each, Rfl: 2    Lancets misc, 1 each 3 (Three) Times a Day., Disp: 100 each, Rfl: 12    loperamide (IMODIUM) 2 MG capsule, Take 1 capsule by mouth 4 (Four) Times a Day As Needed for Diarrhea., Disp: , Rfl:     loperamide (IMODIUM) 2 MG capsule, Take 1 capsule by mouth 4 (Four) Times a Day As Needed for Diarrhea. (Patient not taking: Reported on 9/29/2023), Disp: 40 capsule, Rfl: 0    nystatin (MYCOSTATIN) 100,000 unit/mL suspension, Swish and swallow 5 mL 4 (Four) Times a Day for 7 days., Disp: 150 mL, Rfl: 0    Nystatin-Diphenhydramine-Hydrocortisone-Lidocaine, Take 5 mL by mouth Every 3 (Three) Hours As Needed (Sore throat, trouble swallowing). Swish and swallow, Disp: 300 mL, Rfl: 2    ondansetron ODT (ZOFRAN-ODT) 4 MG disintegrating tablet, Place 1 tablet " on the tongue Every 8 (Eight) Hours As Needed for Nausea or Vomiting. (Patient not taking: Reported on 9/29/2023), Disp: 25 tablet, Rfl: 0    pain patient supplied pump, by Intrathecal route Continuous.  Type of Medication: Hydromorphone  and Bupivacaine Provider:Dr. Boudreaux Provider number: [phone] Basal Dose: Hydromorphone 7.518 mg/day Bupivacaine 0.11253 mg/day ( MAX of Hydromorphone 9.940 mg/ day; Bupivacaine 0.75956 mg /day) Bolus Dose:Hydromorphone 0.500 mg, Bupivacaine 0.92479 mg Lockout 3 hours. 1 Bolus per every 3 hours max of 5 bolus per day Next refill due: July 13 2023 Pump manufacture:[employer], Disp: , Rfl:     SITagliptin (JANUVIA) 25 MG tablet, Daily. (Patient not taking: Reported on 9/29/2023), Disp: , Rfl:     Allergies:   Allergies   Allergen Reactions    Amoxicillin Shortness Of Breath    Ceclor [Cefaclor] Shortness Of Breath    Penicillins Shortness Of Breath    Sulfa Antibiotics Rash    Valium [Diazepam] Mental Status Change     DEPRESSED    Ambien [Zolpidem] Mental Status Change    Aspirin GI Intolerance    Ativan [Lorazepam] Mental Status Change    Benadryl [Diphenhydramine] Anxiety     AND MAKES HER HYPER    Biaxin [Clarithromycin] Unknown - Low Severity    Cefazolin Unknown - Low Severity    Cephalexin Unknown - Low Severity    Cephalosporins Unknown - Low Severity    Clindamycin Unknown - Low Severity    Compazine [Prochlorperazine Edisylate] Rash    Contrast Dye (Echo Or Unknown Ct/Mr) Other (See Comments)     Caused pain in her arm    Doxycycline Rash    Eggs Or Egg-Derived Products GI Intolerance    Nsaids GI Intolerance    Phenergan [Promethazine Hcl] GI Intolerance    Promethazine GI Intolerance    Vancomycin Itching       ECOG: (2) Ambulatory and capable of self care, unable to carry out work activity, up and about > 50% or waking hours  Quality of Life: 40 - Must Use Wheelchair Most of Time     Objective     Physical Exam:   Vital Signs:   Vitals:    09/29/23 1016 09/29/23 1055    BP: 103/47    Pulse: 83    Resp: 16    Temp: 97.5 °F (36.4 °C)    TempSrc: Temporal    SpO2: 96%    Weight: 57.2 kg (126 lb) 57.2 kg (126 lb 1.7 oz)   PainSc: 8  Comment: ALL OVER AND NECK AND THROAT      Body mass index is 20.34 kg/m².     Physical Exam  Constitutional:       General: She is not in acute distress.     Appearance: Normal appearance. She is not toxic-appearing.   HENT:      Head: Normocephalic and atraumatic.   Neck:      Comments: Hyperpigmentation of anterior neck skin with marked improvement since last evaluated  Pulmonary:      Effort: Pulmonary effort is normal. No respiratory distress.   Neurological:      General: No focal deficit present.      Mental Status: She is alert and oriented to person, place, and time.      Cranial Nerves: No cranial nerve deficit.   Psychiatric:         Mood and Affect: Mood normal.         Behavior: Behavior normal.         Judgment: Judgment normal.       Radiographs: CT Chest Without Contrast Diagnostic    Result Date: 9/5/2023   1. New patchy ground-glass and tree-in-bud opacities within the medial right middle lobe and anterior left lower lobe with mild bronchial wall thickening suggesting endobronchial infection.  This is superimposed on mild to moderate emphysema.  The upper lobe consolidations on the prior study from 5/26/2023 have resolved. 2. Other chronic ancillary findings are detailed above.     NITISH NASH DO       Electronically Signed and Approved By: NITISH NASH DO on 9/05/2023 at 11:45             FL Video Swallow With Speech Single Contrast    Result Date: 8/10/2023     1. 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  She has involvement of the laryngeal side of the epiglottis.  She has no clinical or radiographic evidence of regional or distant metastatic disease.  ECOG 2    I will see Ms. Llanes in follow-up in 3 weeks after CT scan of the soft tissue neck with IV contrast and nasopharyngoscopy at that time.  A PET/CT will be ordered for 3 months post completion of therapy.  She will be evaluated by Dr. Mancuso in the coming days.  She was encouraged to contact me in the interim with any questions or concerns regarding her care.      Goyo Nunez MD  Radiation Oncology  [facility]    This document has been signed by Goyo Nunez MD on September 29, 2023 11:29 EDT

## 2023-09-29 NOTE — PROGRESS NOTES
Diagnosis: Laryngeal Cancer    Reason for Referral: Clinical staff requesting assistance with power wheelchair process to replace or repair.    Content of Visit: [facility] contacted [facility] to determine what the status was on her power wheelchair replacement. [facility] staff stated patient needed two orders from the doctor: one to repair the power chair, one to replace the power chair, and a PT order for an evaluation. [facility] staff stated if the home is not handicap accessible it will be denied on a home inspection.     Resources/Referrals Provided: No other resources identified.

## 2023-10-05 DIAGNOSIS — C32.1 SQUAMOUS CELL CARCINOMA OF EPIGLOTTIS: ICD-10-CM

## 2023-10-05 RX ORDER — HYDROCODONE BITARTRATE AND ACETAMINOPHEN 10; 325 MG/1; MG/1
1 TABLET ORAL EVERY 6 HOURS PRN
Qty: 50 TABLET | Refills: 0 | Status: SHIPPED | OUTPATIENT
Start: 2023-10-05 | End: 2023-10-06 | Stop reason: SDUPTHER

## 2023-10-05 RX ORDER — ONDANSETRON 4 MG/1
4 TABLET, ORALLY DISINTEGRATING ORAL EVERY 8 HOURS PRN
Qty: 25 TABLET | Refills: 0 | Status: SHIPPED | OUTPATIENT
Start: 2023-10-05

## 2023-10-06 ENCOUNTER — OFFICE VISIT (OUTPATIENT)
Dept: SURGERY | Facility: CLINIC | Age: 58
End: 2023-10-06
Payer: COMMERCIAL

## 2023-10-06 VITALS
WEIGHT: 127.87 LBS | SYSTOLIC BLOOD PRESSURE: 100 MMHG | DIASTOLIC BLOOD PRESSURE: 60 MMHG | RESPIRATION RATE: 16 BRPM | BODY MASS INDEX: 20.62 KG/M2

## 2023-10-06 DIAGNOSIS — Z93.1 GASTROSTOMY TUBE IN PLACE: Primary | ICD-10-CM

## 2023-10-06 DIAGNOSIS — C32.1 SQUAMOUS CELL CARCINOMA OF EPIGLOTTIS: Primary | ICD-10-CM

## 2023-10-06 PROCEDURE — 1159F MED LIST DOCD IN RCRD: CPT | Performed by: SURGERY

## 2023-10-06 PROCEDURE — 1160F RVW MEDS BY RX/DR IN RCRD: CPT | Performed by: SURGERY

## 2023-10-06 PROCEDURE — 99212 OFFICE O/P EST SF 10 MIN: CPT | Performed by: SURGERY

## 2023-10-06 RX ORDER — HYDROCODONE BITARTRATE AND ACETAMINOPHEN 10; 325 MG/1; MG/1
1 TABLET ORAL EVERY 6 HOURS PRN
Qty: 50 TABLET | Refills: 0 | Status: SHIPPED | OUTPATIENT
Start: 2023-10-06 | End: 2023-10-09 | Stop reason: SDUPTHER

## 2023-10-06 RX ORDER — HYDROMORPHONE HCL IN WATER/PF 6 MG/30 ML
PATIENT CONTROLLED ANALGESIA SYRINGE INTRAVENOUS
COMMUNITY

## 2023-10-06 NOTE — PROGRESS NOTES
"Chief Complaint:  Feeding Tube Consult    Primary Care Provider: Vidal Luevano APRN    Referring Provider: Goyo Nunez MD    History of Present Illness  Isha Llanes is a 57 y.o. female here because she wants her PEG tube removed.  PEG tube was placed on 7/26/23 to use while the patient received treatment for squamous cell cancer of the supraglottic larynx.  Patient says she no longer needs the tube and she wants to have it removed.    Allergies: Amoxicillin, Ceclor [cefaclor], Penicillins, Sulfa antibiotics, Valium [diazepam], Ambien [zolpidem], Aspirin, Ativan [lorazepam], Benadryl [diphenhydramine], Biaxin [clarithromycin], Cefazolin, Cephalexin, Cephalosporins, Clindamycin, Compazine [prochlorperazine edisylate], Contrast dye (echo or unknown ct/mr), Doxycycline, Eggs or egg-derived products, Nsaids, Phenergan [promethazine hcl], Promethazine, and Vancomycin    Outpatient Medications Marked as Taking for the 10/6/23 encounter (Office Visit) with Kody Mercre MD   Medication Sig Dispense Refill    Acetaminophen Extra Strength 500 MG tablet TAKE 2 TO 3 TABLETS BY MOUTH EVERY 6 HOURS AS NEEDED FOR MILD PAIN 60 tablet 3    albuterol sulfate  (90 Base) MCG/ACT inhaler inhale TWO puffs BY MOUTH EVERY 4 HOURS AS NEEDED for wheezing 18 g 2    ALPRAZolam (Xanax) 0.5 MG tablet Take 1 tablet by mouth At Night As Needed for Anxiety. 15 tablet 0    B-D UF III MINI PEN NEEDLES 31G X 5 MM misc Inject 100 each under the skin into the appropriate area as directed 4 (Four) Times a Day. 100 each 12    bacitracin 500 UNIT/GM ointment Apply 1 application  topically to the appropriate area as directed 2 (Two) Times a Day. 28 g 5    clopidogrel (PLAVIX) 75 MG tablet Take 1 tablet by mouth Daily.      Elastic Bandages & Supports (ACE Elastic Bandage 4\") misc 1 Device Daily. One on each leg, bilateral.   #24 = one month supply. 24 each 3    FREESTYLE LITE test strip by Other route 4 (Four) Times a Day. Use to " "test blood sugar 4 times daily      furosemide (LASIX) 40 MG tablet Take 1 tablet by mouth Daily. 90 tablet 1    gabapentin (NEURONTIN) 300 MG capsule TAKE ONE CAPSULE BY MOUTH THREE TIMES DAILY (Patient taking differently: Take 1 capsule by mouth 4 (Four) Times a Day As Needed.) 270 capsule 1    glucose blood test strip Use as instructed 100 each 12    glucose monitor monitoring kit 1 each As Needed (Test blood sugar TID). 1 each 0    HYDROcodone-acetaminophen (NORCO)  MG per tablet Take 1 tablet by mouth Every 6 (Six) Hours As Needed for Moderate Pain. 50 tablet 0    HYDROmorphone (DILAUDID) 1 mg/mL solution Infuse  into a venous catheter Dose Adjusted By Provider As Needed. PER PAIN PUMP      HYDROmorphone HCl (DILAUDID) 0.2 MG/ML injection HYDROmorphone HCl      Insulin Lispro, 1 Unit Dial, (HUMALOG) 100 UNIT/ML solution pen-injector INJECT ONE TO 10 UNITS WITH MEALS PER SLIDING SCALE, MAX OF 30 UNITS PER DAY 15 mL 1    Insulin Syringe 31G X 5/16\" 1 ML misc 1 each Daily. 100 each 2    Lancets misc 1 each 3 (Three) Times a Day. 100 each 12    loperamide (IMODIUM) 2 MG capsule Take 1 capsule by mouth 4 (Four) Times a Day As Needed for Diarrhea.      loperamide (IMODIUM) 2 MG capsule Take 1 capsule by mouth 4 (Four) Times a Day As Needed for Diarrhea. 40 capsule 0    Nystatin-Diphenhydramine-Hydrocortisone-Lidocaine Take 5 mL by mouth Every 3 (Three) Hours As Needed (Sore throat, trouble swallowing). Swish and swallow 300 mL 2    ondansetron ODT (ZOFRAN-ODT) 4 MG disintegrating tablet Place 1 tablet on the tongue Every 8 (Eight) Hours As Needed for Nausea or Vomiting. 25 tablet 0    pain patient supplied pump by Intrathecal route Continuous.   Type of Medication: Hydromorphone  and Bupivacaine  Provider:Dr. Boudreaux  Provider number: [phone]  Basal Dose: Hydromorphone 7.518 mg/day Bupivacaine 0.44321 mg/day  ( MAX of Hydromorphone 9.940 mg/ day; Bupivacaine 0.27326 mg /day)  Bolus Dose:Hydromorphone 0.500 " mg, Bupivacaine 0.21897 mg  Lockout 3 hours. 1 Bolus per every 3 hours max of 5 bolus per day  Next refill due: July 13 2023  Pump manufacture:[employer]      SITagliptin (JANUVIA) 25 MG tablet Daily.         Past Medical History:    Anesthesia    REPORTS HAS GOT REAL EMOTIONAL AND HAS BECOME ANGRY BEFORE    Anxiety    Aortic stenosis, severe    HAS BIO VALVE REPLACED    Arthritis    Asthma    Back pain    Blood clotting disorder    Cancer    Cancer associated pain    HODGKINS LYMPHOMA    CHF (congestive heart failure)    Chronic low back pain with sciatica    Chronic pain disorder    COPD (chronic obstructive pulmonary disease)    Coronary artery disease    Diabetes mellitus    TYPE 2    Diabetes mellitus    Dyspnea on effort    GERD (gastroesophageal reflux disease)    H/O lumpectomy    H/O: hysterectomy    Hiatal hernia    History of degenerative disc disease    History of kidney stones    History of pulmonary embolus (PE)    History of transfusion    AS A CHILD NO TRANSFUSION REACTION    History of transfusion    Hodgkin's lymphoma    5/19/23  5 YEARS SINCE LAST CHEMO TX    Hypertension    Immobility    Liver disease    DENIES ANY CURRENT ISSUES    Lupus    Mitral valve prolapse    Panic disorder    Pelvic pain    Peripheral neuropathy    Personal history of DVT (deep vein thrombosis)    Presence of intrathecal pump    PTSD (post-traumatic stress disorder)    Sacroiliac joint disease    SI (sacroiliac) joint inflammation    Sleep apnea    Venous insufficiency        Past Surgical History:    ABDOMINAL SURGERY    BACK SURGERY    SPINAL FUSION     BACK SURGERY    BLADDER REPAIR    CARDIAC CATHETERIZATION    Procedure: Valvuloplasty;  Surgeon: Wayne Johnson MD;  Location: [facility] INVASIVE LOCATION;  Service: Cardiology    CARDIAC CATHETERIZATION    CARDIAC CATHETERIZATION    Procedure: Cardiac Catheterization/Vascular Study;  Surgeon: Poncho Reid MD;  Location: [facility] CATH INVASIVE LOCATION;   Service: Cardiovascular;  Laterality: Left;    CARDIAC SURGERY    CARDIAC VALVE REPLACEMENT    CHOLECYSTECTOMY    COLONOSCOPY    Procedure: COLONOSCOPY;  Surgeon: Karine Orlando MD;  Location: [facility] ENDOSCOPY;  Service: Gastroenterology;  Laterality: N/A;  POOR PREP    COLONOSCOPY    Procedure: COLONOSCOPY WITH POLYPECTOMY;  Surgeon: Karine Orlando MD;  Location: [facility] ENDOSCOPY;  Service: Gastroenterology;  Laterality: N/A;  COLON POLYP, HEMORRHOIDS, POOR PREP    COLONOSCOPY    DIRECT LARYNGOSCOPY, ESOPHAGOSCOPY, BRONCHOSCOPY    Procedure: DIRECT LARYNGOSCOPY WITH BIOPSIES , ESOPHAGOSCOPY, BRONCHOSCOPY;  Surgeon: Ronnie Mcgarry MD;  Location: [facility] OR [facility];  Service: ENT;  Laterality: N/A;    ENDOSCOPY    Procedure: ESOPHAGOGASTRODUODENOSCOPY;  Surgeon: Karine Orlando MD;  Location: [facility] ENDOSCOPY;  Service: Gastroenterology;  Laterality: N/A;  ESOPHAGITIS, GASTRITIS, HIATAL HERNIA    ENDOSCOPY    HYSTERECTOMY    LYMPHADENECTOMY    PAIN PUMP INSERTION/REVISION    Procedure: PAIN PUMP INSERTION [facility] 10-18-19 [place];  Surgeon: Horace Rios MD;  Location: [facility];  Service: Pain Management    PAIN PUMP INSERTION/REVISION    Procedure: pain pump removal;  Surgeon: Horace Rios MD;  Location: [facility];  Service: Pain Management;  Laterality: N/A;    PEG TUBE INSERTION    Procedure: PERCUTANEOUS ENDOSCOPIC GASTROSTOMY TUBE INSERTION;  Surgeon: Kody Mercer MD;  Location: [facility] ENDOSCOPY;  Service: General;  Laterality: N/A;  SUCCESSFUL PEG TUBE PLACE PLACEMENT    PORTACATH PLACEMENT    IN LEFT CHEST REPORTS THAT IT IS NOT FUNCTIONING    SKIN BIOPSY    TONSILLECTOMY    TUBAL ABDOMINAL LIGATION    TUMOR REMOVAL       Family History:   Family History   Problem Relation Age of Onset    Heart failure Mother     Anxiety disorder Mother     Prostate cancer Father     Depression Sister     Bipolar disorder Sister     Pancreatic cancer Sister     ADD / ADHD  Brother     Thyroid cancer Maternal Grandmother     Pancreatic cancer Paternal Grandmother     ADD / ADHD Grandson     ADD / ADHD Granddaughter     ADD / ADHD Nephew     Malig Hyperthermia Neg Hx         Social History:  Social History     Tobacco Use    Smoking status: Every Day     Packs/day: 1.50     Years: 20.00     Pack years: 30.00     Types: Cigarettes     Start date: 1979    Smokeless tobacco: Never   Substance Use Topics    Alcohol use: Never       Objective     Vital Signs:  /60 (BP Location: Right arm, Patient Position: Sitting, Cuff Size: Adult)   Resp 16   Wt 58 kg (127 lb 13.9 oz)   BMI 20.62 kg/m²   Respiratory:  breathing not labored, respiratory effort appears normal  Cardiovascular:  heart regular rate  Abdomen:  soft, nontender, nondistended.  Gastrostomy tube in place.  Skin around gastrostomy tube looks fine.  Manual traction was placed on gastrostomy tube and gastrostomy tube removed without difficulty.  Bandage placed.  Musculoskeletal: moving all extremities symmetrically and purposefully  Neurologic:  no obvious motor or sensory deficits      Assessment:  Diagnoses and all orders for this visit:    1. Gastrostomy tube in place (Primary)        Plan:  Successful removal of the gastrostomy tube today.  Wound care instructions provided verbally.  Patient will see me PRN.    Kody Mercer MD  10/06/2023    Electronically signed by Kody Mercer MD, 10/06/23, 12:27 PM EDT.

## 2023-10-09 DIAGNOSIS — C32.1 SQUAMOUS CELL CARCINOMA OF EPIGLOTTIS: Primary | ICD-10-CM

## 2023-10-09 RX ORDER — HYDROCODONE BITARTRATE AND ACETAMINOPHEN 10; 325 MG/1; MG/1
1 TABLET ORAL EVERY 6 HOURS PRN
Qty: 50 TABLET | Refills: 0 | Status: SHIPPED | OUTPATIENT
Start: 2023-10-09

## 2023-10-10 RX ORDER — FLUCONAZOLE 100 MG/1
100 TABLET ORAL DAILY
Qty: 7 TABLET | Refills: 0 | Status: SHIPPED | OUTPATIENT
Start: 2023-10-10 | End: 2023-10-17

## 2023-10-23 DIAGNOSIS — C32.1 SQUAMOUS CELL CARCINOMA OF EPIGLOTTIS: Primary | ICD-10-CM

## 2023-10-23 RX ORDER — HYDROCODONE BITARTRATE AND ACETAMINOPHEN 10; 325 MG/1; MG/1
1 TABLET ORAL EVERY 6 HOURS PRN
Qty: 50 TABLET | Refills: 0 | Status: SHIPPED | OUTPATIENT
Start: 2023-10-23

## 2023-10-25 ENCOUNTER — HOSPITAL ENCOUNTER (OUTPATIENT)
Dept: CT IMAGING | Facility: HOSPITAL | Age: 58
Discharge: HOME | End: 2023-10-25
Admitting: RADIOLOGY
Payer: COMMERCIAL

## 2023-10-25 DIAGNOSIS — C32.1 MALIGNANT NEOPLASM OF SUPRAGLOTTIS: ICD-10-CM

## 2023-10-25 PROCEDURE — 70490 CT SOFT TISSUE NECK W/O DYE: CPT

## 2023-10-27 ENCOUNTER — OFFICE VISIT (OUTPATIENT)
Dept: RADIATION ONCOLOGY | Facility: HOSPITAL | Age: 58
End: 2023-10-27
Payer: COMMERCIAL

## 2023-10-27 VITALS
BODY MASS INDEX: 20.23 KG/M2 | TEMPERATURE: 98.3 F | DIASTOLIC BLOOD PRESSURE: 63 MMHG | SYSTOLIC BLOOD PRESSURE: 93 MMHG | HEART RATE: 98 BPM | WEIGHT: 125.44 LBS | OXYGEN SATURATION: 97 % | RESPIRATION RATE: 16 BRPM

## 2023-10-27 DIAGNOSIS — F41.9 ANXIETY: ICD-10-CM

## 2023-10-27 DIAGNOSIS — C32.1 MALIGNANT NEOPLASM OF SUPRAGLOTTIS: Primary | ICD-10-CM

## 2023-10-27 PROCEDURE — G0463 HOSPITAL OUTPT CLINIC VISIT: HCPCS | Performed by: RADIOLOGY

## 2023-10-27 RX ORDER — ALPRAZOLAM 0.5 MG/1
0.5 TABLET ORAL NIGHTLY PRN
Qty: 15 TABLET | Refills: 0 | Status: SHIPPED | OUTPATIENT
Start: 2023-10-27 | End: 2023-11-16 | Stop reason: SDUPTHER

## 2023-10-27 RX ORDER — METHYLPREDNISOLONE 4 MG/1
TABLET ORAL
Qty: 21 TABLET | Refills: 0 | Status: SHIPPED | OUTPATIENT
Start: 2023-10-27 | End: 2023-12-06 | Stop reason: SDUPTHER

## 2023-11-13 ENCOUNTER — DOCUMENTATION (OUTPATIENT)
Dept: RADIATION ONCOLOGY | Facility: HOSPITAL | Age: 58
End: 2023-11-13
Payer: COMMERCIAL

## 2023-11-13 NOTE — PROGRESS NOTES
Radiation oncologist received home health orders from [facility] to sign for Ms. Llanes's wound care. OSW notified Christi with [facility] that the radiation oncologist will no longer be managing Ms. Llanes's home health care due to this not being related to her radiation therapy treatments and no longer being in active treatment with us at this time. Deferring this to PCP or medical oncologist. OSW support remains available.   30-Sep-2022 21:18

## 2023-11-14 ENCOUNTER — TELEPHONE (OUTPATIENT)
Dept: FAMILY MEDICINE CLINIC | Facility: CLINIC | Age: 58
End: 2023-11-14

## 2023-11-14 NOTE — TELEPHONE ENCOUNTER
Caller: Isha Llanes    Relationship: Self    Best call back number: [phone]    What is the best time to reach you: ANYTIME, PLEASE LEAVE A MESSAGE OR TEST HER AND SHE WILL CALL BACK     Who are you requesting to speak with (clinical staff, provider,  specific staff member): NURSE       What was the call regarding: PATIENT IS CALLING STATING THAT [facility] IS COMING TODAY TO DISCHARGE HER BUT SHE IS STILL NEEDING TO HAVE WOUND CARE TO HER LEGS AND NEEDS TO HAVE SOMEONE SIGN OFF ON THE PAPER TO HAVE THEM TO CONTINUE TO COME.

## 2023-11-16 ENCOUNTER — OFFICE VISIT (OUTPATIENT)
Dept: FAMILY MEDICINE CLINIC | Facility: CLINIC | Age: 58
End: 2023-11-16
Payer: COMMERCIAL

## 2023-11-16 VITALS
SYSTOLIC BLOOD PRESSURE: 102 MMHG | TEMPERATURE: 97.1 F | HEART RATE: 92 BPM | BODY MASS INDEX: 20.09 KG/M2 | HEIGHT: 66 IN | WEIGHT: 125 LBS | DIASTOLIC BLOOD PRESSURE: 70 MMHG | OXYGEN SATURATION: 99 %

## 2023-11-16 DIAGNOSIS — F41.9 ANXIETY: ICD-10-CM

## 2023-11-16 DIAGNOSIS — M16.11 PRIMARY OSTEOARTHRITIS OF RIGHT HIP: ICD-10-CM

## 2023-11-16 DIAGNOSIS — I87.319 CHRONIC VENOUS HYPERTENSION W ULCERATION: ICD-10-CM

## 2023-11-16 DIAGNOSIS — Z85.71 HISTORY OF HODGKIN'S LYMPHOMA: ICD-10-CM

## 2023-11-16 DIAGNOSIS — F41.0 PANIC DISORDER: ICD-10-CM

## 2023-11-16 DIAGNOSIS — G72.81 CRITICAL ILLNESS MYOPATHY: Primary | ICD-10-CM

## 2023-11-16 DIAGNOSIS — J44.9 CHRONIC OBSTRUCTIVE PULMONARY DISEASE, UNSPECIFIED COPD TYPE: ICD-10-CM

## 2023-11-16 DIAGNOSIS — L97.909 CHRONIC VENOUS HYPERTENSION W ULCERATION: ICD-10-CM

## 2023-11-16 DIAGNOSIS — R11.2 NAUSEA AND VOMITING, UNSPECIFIED VOMITING TYPE: ICD-10-CM

## 2023-11-16 DIAGNOSIS — L08.9 SKIN INFECTION: ICD-10-CM

## 2023-11-16 RX ORDER — CETIRIZINE HYDROCHLORIDE 10 MG/1
1 TABLET ORAL DAILY
COMMUNITY
Start: 2023-10-31

## 2023-11-16 RX ORDER — ALPRAZOLAM 0.5 MG/1
0.5 TABLET ORAL NIGHTLY PRN
Qty: 15 TABLET | Refills: 1 | Status: SHIPPED | OUTPATIENT
Start: 2023-11-16

## 2023-11-16 RX ORDER — ONDANSETRON 4 MG/1
4 TABLET, FILM COATED ORAL EVERY 12 HOURS PRN
Qty: 60 TABLET | Refills: 1 | Status: SHIPPED | OUTPATIENT
Start: 2023-11-16

## 2023-11-16 NOTE — PROGRESS NOTES
Chief Complaint  Establish Care and Wound Check (Legs)    Subjective      History of Present Illness  Isha Llanes is a 57 y.o. 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  This that a PEG tube placed on 7/26/2023, removed on 10/6/2023.  Patient received external beam radiotherapy, and completed that with Dr. Nunez.  He has a PET scan scheduled for December 18 she presents today to establish care. Pt has severe muscle atrophy from chemotherapy and critical illness myopathy, she has been in a wheelchair for about 5 years.  Past Medical History:   Diagnosis Date    Anesthesia     REPORTS HAS GOT REAL EMOTIONAL AND HAS BECOME ANGRY BEFORE    Anxiety     Aortic stenosis, severe     HAS BIO VALVE REPLACED    Arthritis     Asthma     Back pain     Blood clotting disorder     Cancer     Cancer associated pain     HODGKINS LYMPHOMA    CHF (congestive heart failure)     Chronic low back pain with sciatica     Chronic pain disorder     COPD (chronic obstructive pulmonary disease)     Coronary artery disease     Diabetes mellitus     TYPE 2    Diabetes mellitus     Dyspnea on effort     GERD (gastroesophageal reflux disease)     H/O lumpectomy     H/O: hysterectomy     Hiatal hernia     History of degenerative disc disease     History of kidney stones     History of pulmonary embolus (PE)     History of transfusion     AS A CHILD NO TRANSFUSION REACTION    History of transfusion     Hodgkin's lymphoma     5/19/23  5 YEARS SINCE LAST CHEMO TX    Hypertension     Immobility     Liver disease     DENIES ANY CURRENT ISSUES    Lupus     Mitral valve prolapse     Panic disorder     Pelvic pain     Peripheral neuropathy     Personal history of DVT (deep vein thrombosis)     Presence of intrathecal pump     PTSD (post-traumatic stress disorder)     Sacroiliac joint disease     SI (sacroiliac) joint inflammation     Sleep apnea  "    Venous insufficiency          Objective   Vital Signs:   Vitals:    11/16/23 0933 11/16/23 1011   BP:  102/70   Pulse: 92    Temp: 97.1 °F (36.2 °C)    SpO2: 99%    Weight: 56.7 kg (125 lb)    Height: 167.7 cm (66.02\")      Body mass index is 20.16 kg/m².    Wt Readings from Last 3 Encounters:   11/16/23 56.7 kg (125 lb)   10/27/23 56.9 kg (125 lb 7.1 oz)   10/06/23 58 kg (127 lb 13.9 oz)     BP Readings from Last 3 Encounters:   11/16/23 102/70   10/27/23 93/63   10/06/23 100/60       Health Maintenance   Topic Date Due    Hepatitis B (1 of 3 - 3-dose series) Never done    COVID-19 Vaccine (1) Never done    Pneumococcal Vaccine 0-64 (1 - PCV) Never done    TDAP/TD VACCINES (1 - Tdap) Never done    ZOSTER VACCINE (1 of 2) Never done    ANNUAL PHYSICAL  Never done    DIABETIC FOOT EXAM  Never done    PAP SMEAR  03/29/2017    DIABETIC EYE EXAM  Never done    MAMMOGRAM  01/08/2021    HEMOGLOBIN A1C  07/12/2023    INFLUENZA VACCINE  Never done    LIPID PANEL  01/12/2024    URINE MICROALBUMIN  01/12/2024    LUNG CANCER SCREENING  09/05/2024    COLORECTAL CANCER SCREENING  04/13/2032    HEPATITIS C SCREENING  Completed       Physical Exam     General: Patient in wheelchair, critically ill-appearing thinning hair AAO x3, no acute distress.  Eyes:  EOMI PERRLA  Ears/Nose/Mouth/Throat:  Moist mucous membranes  Respiratory:  CTA bilaterally, no wheezes rales or rhonchi  Cardiovascular:  RRR no murmur rubs or gallops  Gastrointestinal:  Abdomen soft nondistended nontender bowel sounds present x4 quadrants  Musculoskeletal:  Moves all 4 extremities spontaneously, weakness and muscle atrophy noted in bilateral upper and lower extremity   skin:  Warm, dry, no skin rashes or lesions, skin lesions noted in bilateral upper extremity appear to be grouped vesicles round and excoriated  Neurologic:  AAO x3, cranial nerves 2-12 grossly intact, no focal neuro deficits  Psychiatric:  Normal mood and affect    Result Review :  The " following data was reviewed by: Katia Wright MD on 11/16/2023:      Procedures        Assessment and Plan   Diagnoses and all orders for this visit:    1. Critical illness myopathy (Primary)  -     Pulse Oximetry Device  -     Ambulatory Referral to Home Health  -     Cancel: Miscellaneous DME  -     Cancel: Miscellaneous DME  -     Cancel: Miscellaneous DME  -     Cancel: Miscellaneous DME  -     Miscellaneous DME  -     Miscellaneous DME    2. History of Hodgkin's lymphoma  -     Ambulatory Referral to Home Health  -     Cancel: Miscellaneous DME  -     Cancel: Miscellaneous DME  -     Cancel: Miscellaneous DME  -     Cancel: Miscellaneous DME  -     Miscellaneous DME  -     Miscellaneous DME    3. Primary osteoarthritis of right hip  -     Ambulatory Referral to Home Health  -     Cancel: Miscellaneous DME  -     Cancel: Miscellaneous DME  -     Cancel: Miscellaneous DME  -     Cancel: Miscellaneous DME  -     Miscellaneous DME  -     Miscellaneous DME    4. Panic disorder  -     ALPRAZolam (Xanax) 0.5 MG tablet; Take 1 tablet by mouth At Night As Needed for Anxiety.  Dispense: 15 tablet; Refill: 1  -     Ambulatory Referral to Psychiatry  -     Ambulatory Referral to Home Health    5. Anxiety  -     ALPRAZolam (Xanax) 0.5 MG tablet; Take 1 tablet by mouth At Night As Needed for Anxiety.  Dispense: 15 tablet; Refill: 1  -     Ambulatory Referral to Psychiatry  -     Ambulatory Referral to Home Health    6. Chronic obstructive pulmonary disease, unspecified COPD type  -     Pulse Oximetry Device  -     Cancel: Miscellaneous DME  -     Cancel: Miscellaneous DME  -     Miscellaneous DME    7. Chronic venous hypertension w ulceration  -     Ambulatory Referral to Home Health  -     Cancel: Miscellaneous DME  -     Cancel: Miscellaneous DME  -     Cancel: Miscellaneous DME  -     Cancel: Miscellaneous DME  -     Miscellaneous DME  -     Miscellaneous DME    8. Skin infection  -     mupirocin (BACTROBAN) 2 %  ointment; Apply 1 application  topically to the appropriate area as directed 3 (Three) Times a Day.  Dispense: 30 g; Refill: 2    9. Nausea and vomiting, unspecified vomiting type    Other orders  -     ondansetron (Zofran) 4 MG tablet; Take 1 tablet by mouth Every 12 (Twelve) Hours As Needed for Nausea or Vomiting.  Dispense: 60 tablet; Refill: 1      Power Wheelchair: Patient is unable to safely use a cane, walker or manual wheelchair. Due to immobility related to diagnosis, a power wheelchair is needed to assist with daily living activities inside the home. Patient has the physical ability and mental capabilities to control the power wheelchair inside the home.     Hospital Bed: Patient has a medical condition which requires positioning of the body in ways not feasible with an ordinary bed  Patient requires the head of the bed to be elevated more than 30 degrees most of the time because of CHF, COPD or other problems with aspiration    BMI is within normal parameters. No other follow-up for BMI required.         FOLLOW UP  No follow-ups on file.  Patient was given instructions and counseling regarding her condition or for health maintenance advice. Please see specific information pulled into the AVS if appropriate.       Katia Wright MD  11/16/23  10:30 EST    CURRENT & DISCONTINUED MEDICATIONS  Current Outpatient Medications   Medication Instructions    Acetaminophen Extra Strength 500 MG tablet TAKE 2 TO 3 TABLETS BY MOUTH EVERY 6 HOURS AS NEEDED FOR MILD PAIN    albuterol sulfate  (90 Base) MCG/ACT inhaler inhale TWO puffs BY MOUTH EVERY 4 HOURS AS NEEDED for wheezing    ALPRAZolam (XANAX) 0.5 mg, Oral, Nightly PRN    B-D UF III MINI PEN NEEDLES 31G X 5 MM misc 100 each, Subcutaneous, 4 Times Daily    bacitracin 0.9 g, Topical, 2 Times Daily    cetirizine (zyrTEC) 10 MG tablet 1 tablet, Oral, Daily    clopidogrel (PLAVIX) 75 MG tablet 1 tablet, Oral, Daily    Elastic Bandages & Supports (ACE Elastic  "Bandage 4\") misc 1 Device, Does not apply, Daily, One on each leg, bilateral.   #24 = one month supply.    FREESTYLE LITE test strip Other, 4 Times Daily, Use to test blood sugar 4 times daily    furosemide (LASIX) 40 mg, Oral, Daily    gabapentin (NEURONTIN) 300 MG capsule TAKE ONE CAPSULE BY MOUTH THREE TIMES DAILY    glucose blood test strip Use as instructed    glucose monitor monitoring kit 1 each, Does not apply, As Needed    HYDROcodone-acetaminophen (NORCO)  MG per tablet 1 tablet, Oral, Every 6 Hours PRN    HYDROmorphone (DILAUDID) 1 mg/mL solution Intravenous, Titrated, PER PAIN PUMP    HYDROmorphone HCl (DILAUDID) 0.2 MG/ML injection HYDROmorphone HCl    insulin detemir (LEVEMIR) 10 Units, Subcutaneous, Daily    Insulin Lispro, 1 Unit Dial, (HUMALOG) 100 UNIT/ML solution pen-injector INJECT ONE TO 10 UNITS WITH MEALS PER SLIDING SCALE, MAX OF 30 UNITS PER DAY    Insulin Syringe 31G X 5/16\" 1 ML misc 1 each, Does not apply, Daily    Lancets misc 1 each, Does not apply, 3 Times Daily    loperamide (IMODIUM) 2 mg, Oral, 4 Times Daily PRN    methylPREDNISolone (MEDROL) 4 MG dose pack Take as directed on package instructions.    mupirocin (BACTROBAN) 2 % ointment 1 application , Topical, 3 Times Daily    ondansetron (ZOFRAN) 4 mg, Oral, Every 12 Hours PRN    pain patient supplied pump Intrathecal, Continuous, <BR>Type of Medication: Hydromorphone  and Bupivacaine<BR>Provider:Dr. Boudreaux<BR>Provider number: [phone]<BR>Basal Dose: Hydromorphone 7.518 mg/day Bupivacaine 0.36613 mg/day<BR>( MAX of Hydromorphone 9.940 mg/ day; Bupivacaine 0.09945 mg /day)<BR>Bolus Dose:Hydromorphone 0.500 mg, Bupivacaine 0.65891 mg<BR>Lockout 3 hours. 1 Bolus per every 3 hours max of 5 bolus per day<BR>Next refill due: July 13 2023<BR>Pump manufacture:[employer]<BR><BR><BR>     SITagliptin (JANUVIA) 25 MG tablet Every 24 Hours       Medications Discontinued During This Encounter   Medication Reason    ALPRAZolam (Xanax) 0.5 " MG tablet Reorder    ondansetron ODT (ZOFRAN-ODT) 4 MG disintegrating tablet

## 2023-11-20 ENCOUNTER — TELEPHONE (OUTPATIENT)
Dept: PSYCHIATRY | Facility: CLINIC | Age: 58
End: 2023-11-20
Payer: COMMERCIAL

## 2023-11-20 DIAGNOSIS — J44.9 CHRONIC OBSTRUCTIVE PULMONARY DISEASE, UNSPECIFIED COPD TYPE: ICD-10-CM

## 2023-11-20 RX ORDER — ALBUTEROL SULFATE 90 UG/1
2 AEROSOL, METERED RESPIRATORY (INHALATION) EVERY 4 HOURS PRN
Qty: 18 G | Refills: 2 | Status: SHIPPED | OUTPATIENT
Start: 2023-11-20

## 2023-11-28 DIAGNOSIS — E11.65 TYPE 2 DIABETES MELLITUS WITH HYPERGLYCEMIA, WITH LONG-TERM CURRENT USE OF INSULIN: Primary | ICD-10-CM

## 2023-11-28 DIAGNOSIS — Z79.4 TYPE 2 DIABETES MELLITUS WITH HYPERGLYCEMIA, WITH LONG-TERM CURRENT USE OF INSULIN: Primary | ICD-10-CM

## 2023-11-28 RX ORDER — INSULIN LISPRO 100 [IU]/ML
100 INJECTION, SOLUTION INTRAVENOUS; SUBCUTANEOUS
Qty: 15 ML | Refills: 1 | Status: SHIPPED | OUTPATIENT
Start: 2023-11-28

## 2023-11-29 ENCOUNTER — TRANSCRIBE ORDERS (OUTPATIENT)
Dept: LAB | Facility: HOSPITAL | Age: 58
End: 2023-11-29
Payer: COMMERCIAL

## 2023-11-29 ENCOUNTER — LAB (OUTPATIENT)
Dept: LAB | Facility: HOSPITAL | Age: 58
End: 2023-11-29
Payer: COMMERCIAL

## 2023-11-29 ENCOUNTER — LAB REQUISITION (OUTPATIENT)
Dept: LAB | Facility: HOSPITAL | Age: 58
End: 2023-11-29
Payer: COMMERCIAL

## 2023-11-29 DIAGNOSIS — Z00.00 ROUTINE CHECK-UP: ICD-10-CM

## 2023-11-29 DIAGNOSIS — Z00.00 ROUTINE CHECK-UP: Primary | ICD-10-CM

## 2023-11-29 DIAGNOSIS — E08.622 DIABETES MELLITUS DUE TO UNDERLYING CONDITION WITH OTHER SKIN ULCER (CODE): ICD-10-CM

## 2023-11-29 LAB
ALBUMIN SERPL-MCNC: 4.9 G/DL (ref 3.5–5.2)
ALBUMIN/GLOB SERPL: 1.8 G/DL
ALP SERPL-CCNC: 86 U/L (ref 39–117)
ALT SERPL W P-5'-P-CCNC: 7 U/L (ref 1–33)
ANION GAP SERPL CALCULATED.3IONS-SCNC: 12.1 MMOL/L (ref 5–15)
AST SERPL-CCNC: 11 U/L (ref 1–32)
BASOPHILS # BLD AUTO: 0.01 10*3/MM3 (ref 0–0.2)
BASOPHILS NFR BLD AUTO: 0.3 % (ref 0–1.5)
BILIRUB SERPL-MCNC: <0.2 MG/DL (ref 0–1.2)
BUN SERPL-MCNC: 12 MG/DL (ref 6–20)
BUN/CREAT SERPL: 26.1 (ref 7–25)
CALCIUM SPEC-SCNC: 9.6 MG/DL (ref 8.6–10.5)
CHLORIDE SERPL-SCNC: 98 MMOL/L (ref 98–107)
CO2 SERPL-SCNC: 28.9 MMOL/L (ref 22–29)
CREAT SERPL-MCNC: 0.46 MG/DL (ref 0.57–1)
DEPRECATED RDW RBC AUTO: 51.1 FL (ref 37–54)
EGFRCR SERPLBLD CKD-EPI 2021: 111.8 ML/MIN/1.73
EOSINOPHIL # BLD AUTO: 0.08 10*3/MM3 (ref 0–0.4)
EOSINOPHIL NFR BLD AUTO: 2.1 % (ref 0.3–6.2)
ERYTHROCYTE [DISTWIDTH] IN BLOOD BY AUTOMATED COUNT: 15.9 % (ref 12.3–15.4)
GLOBULIN UR ELPH-MCNC: 2.8 GM/DL
GLUCOSE SERPL-MCNC: 98 MG/DL (ref 65–99)
HBA1C MFR BLD: 5.9 % (ref 4.8–5.6)
HCT VFR BLD AUTO: 44.7 % (ref 34–46.6)
HGB BLD-MCNC: 14 G/DL (ref 12–15.9)
IMM GRANULOCYTES # BLD AUTO: 0 10*3/MM3 (ref 0–0.05)
IMM GRANULOCYTES NFR BLD AUTO: 0 % (ref 0–0.5)
LYMPHOCYTES # BLD AUTO: 0.58 10*3/MM3 (ref 0.7–3.1)
LYMPHOCYTES NFR BLD AUTO: 14.9 % (ref 19.6–45.3)
MCH RBC QN AUTO: 27.7 PG (ref 26.6–33)
MCHC RBC AUTO-ENTMCNC: 31.3 G/DL (ref 31.5–35.7)
MCV RBC AUTO: 88.3 FL (ref 79–97)
MONOCYTES # BLD AUTO: 0.42 10*3/MM3 (ref 0.1–0.9)
MONOCYTES NFR BLD AUTO: 10.8 % (ref 5–12)
NEUTROPHILS NFR BLD AUTO: 2.8 10*3/MM3 (ref 1.7–7)
NEUTROPHILS NFR BLD AUTO: 71.9 % (ref 42.7–76)
NRBC BLD AUTO-RTO: 0 /100 WBC (ref 0–0.2)
PLATELET # BLD AUTO: 166 10*3/MM3 (ref 140–450)
PMV BLD AUTO: 10.9 FL (ref 6–12)
POTASSIUM SERPL-SCNC: 4.2 MMOL/L (ref 3.5–5.2)
PROT SERPL-MCNC: 7.7 G/DL (ref 6–8.5)
RBC # BLD AUTO: 5.06 10*6/MM3 (ref 3.77–5.28)
SODIUM SERPL-SCNC: 139 MMOL/L (ref 136–145)
WBC NRBC COR # BLD AUTO: 3.89 10*3/MM3 (ref 3.4–10.8)

## 2023-11-29 PROCEDURE — 83036 HEMOGLOBIN GLYCOSYLATED A1C: CPT | Performed by: NURSE PRACTITIONER

## 2023-11-29 PROCEDURE — 80053 COMPREHEN METABOLIC PANEL: CPT | Performed by: NURSE PRACTITIONER

## 2023-11-29 PROCEDURE — 85025 COMPLETE CBC W/AUTO DIFF WBC: CPT | Performed by: NURSE PRACTITIONER

## 2023-12-04 DIAGNOSIS — F41.0 PANIC DISORDER: ICD-10-CM

## 2023-12-04 DIAGNOSIS — F41.9 ANXIETY: Primary | ICD-10-CM

## 2023-12-04 RX ORDER — ALPRAZOLAM 0.5 MG/1
0.5 TABLET ORAL NIGHTLY PRN
Qty: 15 TABLET | Refills: 1 | Status: SHIPPED | OUTPATIENT
Start: 2023-12-04

## 2023-12-06 ENCOUNTER — TELEPHONE (OUTPATIENT)
Dept: FAMILY MEDICINE CLINIC | Facility: CLINIC | Age: 58
End: 2023-12-06
Payer: COMMERCIAL

## 2023-12-06 ENCOUNTER — TELEPHONE (OUTPATIENT)
Dept: FAMILY MEDICINE CLINIC | Facility: CLINIC | Age: 58
End: 2023-12-06

## 2023-12-06 DIAGNOSIS — C32.1 MALIGNANT NEOPLASM OF SUPRAGLOTTIS: ICD-10-CM

## 2023-12-06 NOTE — TELEPHONE ENCOUNTER
Caller: Isha Llanes    Relationship to patient: Self    Best call back number:     [phone]       Patient is needing: PATIENT STATES THAT PHYSICAL AND OCCUPATIONAL THERAPY REFERRALS WERE SUPPOSED TO BE DONE FOR VIA HOME HEALTH. PATIENT WOULD LIKE TO CHECK STATUS.

## 2023-12-06 NOTE — TELEPHONE ENCOUNTER
Caller: Isha Llanes    Relationship: Self    Best call back number:     [phone]      Requested Prescriptions:   Requested Prescriptions     Pending Prescriptions Disp Refills    methylPREDNISolone (MEDROL) 4 MG dose pack 21 tablet 0     Sig: Take as directed on package instructions.        Pharmacy where request should be sent: [facility] - [place], [place] - [address] [facility] - [phone]  - [phone] FX     Last office visit with prescribing clinician: 11/16/2023   Last telemedicine visit with prescribing clinician: Visit date not found   Next office visit with prescribing clinician: 2/19/2024     Additional details provided by patient: ARTHRITIC FLARE UP AND STATES THAT IS THE ONLY THING THAT WILL HELP.     Does the patient have less than a 3 day supply:  [x] Yes  [] No    Would you like a call back once the refill request has been completed: [x] Yes [] No    If the office needs to give you a call back, can they leave a voicemail: [x] Yes [] No    Maximilian Olmstead Rep   12/06/23 11:19 EST

## 2023-12-06 NOTE — TELEPHONE ENCOUNTER
Caller: Isha Llanes    Relationship: Self    Best call back number: [phone]     Equipment requested: WHEELCHAIR AND LIFT  RECLINER CHAIR      Additional information or concerns: PATIENT STATES SHE WANTED TO KNOW THE STATUS OF THE REQUESTS.

## 2023-12-07 RX ORDER — METHYLPREDNISOLONE 4 MG/1
TABLET ORAL
Qty: 21 TABLET | Refills: 0 | Status: SHIPPED | OUTPATIENT
Start: 2023-12-07

## 2023-12-11 ENCOUNTER — TELEPHONE (OUTPATIENT)
Dept: FAMILY MEDICINE CLINIC | Facility: CLINIC | Age: 58
End: 2023-12-11
Payer: COMMERCIAL

## 2023-12-11 NOTE — TELEPHONE ENCOUNTER
"Patient called and stated that she is almost done with her medrol dose pack but she is having pain all over the joints and that she is miserable. She is asking for a   \"Maintenance drug\" to help with joint pain and face pain. Stated that she just had radiation done and is in a lot of pain. She also stated that the anxiety medicine is not helping as well. Patient states that she is still having panic attacks.   "

## 2023-12-13 ENCOUNTER — TELEMEDICINE (OUTPATIENT)
Dept: FAMILY MEDICINE CLINIC | Facility: CLINIC | Age: 58
End: 2023-12-13
Payer: COMMERCIAL

## 2023-12-13 VITALS — BODY MASS INDEX: 20.09 KG/M2 | HEIGHT: 66 IN | WEIGHT: 125 LBS

## 2023-12-13 DIAGNOSIS — M25.551 RIGHT HIP PAIN: ICD-10-CM

## 2023-12-13 DIAGNOSIS — F41.9 ANXIETY: Primary | ICD-10-CM

## 2023-12-13 DIAGNOSIS — C32.1 MALIGNANT NEOPLASM OF SUPRAGLOTTIS: ICD-10-CM

## 2023-12-13 DIAGNOSIS — Z85.71 HISTORY OF HODGKIN'S LYMPHOMA: ICD-10-CM

## 2023-12-13 DIAGNOSIS — M16.9 OSTEOARTHRITIS OF HIP, UNSPECIFIED LATERALITY, UNSPECIFIED OSTEOARTHRITIS TYPE: ICD-10-CM

## 2023-12-13 RX ORDER — HYDROXYCHLOROQUINE SULFATE 200 MG/1
200 TABLET, FILM COATED ORAL DAILY
Qty: 30 TABLET | Refills: 2 | Status: SHIPPED | OUTPATIENT
Start: 2023-12-13 | End: 2024-03-12

## 2023-12-13 RX ORDER — ALPRAZOLAM 0.5 MG/1
0.5 TABLET ORAL 2 TIMES DAILY PRN
Qty: 60 TABLET | Refills: 2 | Status: SHIPPED | OUTPATIENT
Start: 2023-12-13 | End: 2024-02-11

## 2023-12-13 NOTE — PROGRESS NOTES
Chief Complaint  Med Refill (Wanting a med for pain.)    Subjective      History of Present Illness  Isha Llanes is a 58 y.o. female who presents to [facility] with a past medical history of see below. 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  This that a PEG tube placed on 7/26/2023, removed on 10/6/2023.  Patient received external beam radiotherapy, and completed that with Dr. Nunez.  He has a PET scan scheduled for December 18    We tries to set up her mychart.  Pt complains of not feeling well.  Pt has pain pump implanted her back for her back pain. She takes norco  every 6 hours.  She was getting git from her radiation doctor monthly but since completing her radiation. Pt asking to increase her xanax from once a day to twice a day.    Past Medical History:   Diagnosis Date    Anesthesia     REPORTS HAS GOT REAL EMOTIONAL AND HAS BECOME ANGRY BEFORE    Anxiety     Aortic stenosis, severe     HAS BIO VALVE REPLACED    Arthritis     Asthma     Back pain     Blood clotting disorder     Cancer     Cancer associated pain     HODGKINS LYMPHOMA    CHF (congestive heart failure)     Chronic low back pain with sciatica     Chronic pain disorder     COPD (chronic obstructive pulmonary disease)     Coronary artery disease     Diabetes mellitus     TYPE 2    Diabetes mellitus     Dyspnea on effort     GERD (gastroesophageal reflux disease)     H/O lumpectomy     H/O: hysterectomy     Hiatal hernia     History of degenerative disc disease     History of kidney stones     History of pulmonary embolus (PE)     History of transfusion     AS A CHILD NO TRANSFUSION REACTION    History of transfusion     Hodgkin's lymphoma     5/19/23  5 YEARS SINCE LAST CHEMO TX    Hypertension     Immobility     Liver disease     DENIES ANY CURRENT ISSUES    Lupus     Mitral valve prolapse     Panic disorder     Pelvic pain     Peripheral neuropathy     Personal  "history of DVT (deep vein thrombosis)     Presence of intrathecal pump     PTSD (post-traumatic stress disorder)     Sacroiliac joint disease     SI (sacroiliac) joint inflammation     Sleep apnea     Venous insufficiency          Objective   Vital Signs:   Vitals:    12/13/23 1319   Weight: 56.7 kg (125 lb)   Height: 167.7 cm (66.02\")     Body mass index is 20.16 kg/m².    Wt Readings from Last 3 Encounters:   12/13/23 56.7 kg (125 lb)   11/16/23 56.7 kg (125 lb)   10/27/23 56.9 kg (125 lb 7.1 oz)     BP Readings from Last 3 Encounters:   11/16/23 102/70   10/27/23 93/63   10/06/23 100/60       Health Maintenance   Topic Date Due    Hepatitis B (1 of 3 - 3-dose series) Never done    COVID-19 Vaccine (1) Never done    Pneumococcal Vaccine 0-64 (1 - PCV) Never done    TDAP/TD VACCINES (1 - Tdap) Never done    ZOSTER VACCINE (1 of 2) Never done    ANNUAL PHYSICAL  Never done    DIABETIC FOOT EXAM  Never done    PAP SMEAR  03/29/2017    DIABETIC EYE EXAM  Never done    MAMMOGRAM  01/08/2021    INFLUENZA VACCINE  Never done    LIPID PANEL  01/12/2024    URINE MICROALBUMIN  01/12/2024    HEMOGLOBIN A1C  05/29/2024    LUNG CANCER SCREENING  09/05/2024    COLORECTAL CANCER SCREENING  04/13/2032    HEPATITIS C SCREENING  Completed       Physical Exam  Neurological:      Mental Status: She is alert.   Psychiatric:         Mood and Affect: Mood normal.         Thought Content: Thought content normal.         Judgment: Judgment normal.           Result Review :  The following data was reviewed by: Katia Wright MD on 12/13/2023:      Procedures        Assessment and Plan   Diagnoses and all orders for this visit:    1. Anxiety (Primary)  -     ALPRAZolam (Xanax) 0.5 MG tablet; Take 1 tablet by mouth 2 (Two) Times a Day As Needed for Anxiety for up to 60 days.  Dispense: 60 tablet; Refill: 2    2. Malignant neoplasm of supraglottis    3. History of Hodgkin's lymphoma    4. Right hip pain    5. Osteoarthritis of hip, " "unspecified laterality, unspecified osteoarthritis type  -     hydroxychloroquine (Plaquenil) 200 MG tablet; Take 1 tablet by mouth Daily for 90 days. Indications: Rheumatoid Arthritis  Dispense: 30 tablet; Refill: 2    Will see her back in 8 months  Increase xanax today to 0.5 BID   Startes plaquanil for OA today 200 mg daily.     BMI is within normal parameters. No other follow-up for BMI required.         FOLLOW UP  Return in about 8 years (around 12/13/2031).  Patient was given instructions and counseling regarding her condition or for health maintenance advice. Please see specific information pulled into the AVS if appropriate.       Katia Wright MD  12/13/23  14:06 EST    CURRENT & DISCONTINUED MEDICATIONS  Current Outpatient Medications   Medication Instructions    Acetaminophen Extra Strength 500 MG tablet TAKE 2 TO 3 TABLETS BY MOUTH EVERY 6 HOURS AS NEEDED FOR MILD PAIN    albuterol sulfate  (90 Base) MCG/ACT inhaler 2 puffs, Inhalation, Every 4 Hours PRN    ALPRAZolam (XANAX) 0.5 mg, Oral, 2 Times Daily PRN    B-D UF III MINI PEN NEEDLES 31G X 5 MM misc 100 each, Subcutaneous, 4 Times Daily    bacitracin 0.9 g, Topical, 2 Times Daily    cetirizine (zyrTEC) 10 MG tablet 1 tablet, Oral, Daily    clopidogrel (PLAVIX) 75 MG tablet 1 tablet, Oral, Daily    Elastic Bandages & Supports (ACE Elastic Bandage 4\") misc 1 Device, Does not apply, Daily, One on each leg, bilateral.   #24 = one month supply.    FREESTYLE LITE test strip Other, 4 Times Daily, Use to test blood sugar 4 times daily    furosemide (LASIX) 40 mg, Oral, Daily    gabapentin (NEURONTIN) 300 MG capsule TAKE ONE CAPSULE BY MOUTH THREE TIMES DAILY    glucose blood test strip Use as instructed    glucose monitor monitoring kit 1 each, Does not apply, As Needed    HYDROcodone-acetaminophen (NORCO)  MG per tablet 1 tablet, Oral, Every 6 Hours PRN    HYDROmorphone (DILAUDID) 1 mg/mL solution Intravenous, Titrated, PER PAIN PUMP    " "HYDROmorphone HCl (DILAUDID) 0.2 MG/ML injection HYDROmorphone HCl    hydroxychloroquine (PLAQUENIL) 200 mg, Oral, Daily    insulin detemir (LEVEMIR) 10 Units, Subcutaneous, Daily    Insulin Lispro (1 Unit Dial) (HUMALOG) 100 Units, Injection, Daily With Dinner, Inject 1to 10 units with meals per sliding scale, max of 30 units per day    Insulin Syringe 31G X 5/16\" 1 ML misc 1 each, Does not apply, Daily    Lancets misc 1 each, Does not apply, 3 Times Daily    loperamide (IMODIUM) 2 mg, Oral, 4 Times Daily PRN    methylPREDNISolone (MEDROL) 4 MG dose pack Take as directed on package instructions.    mupirocin (BACTROBAN) 2 % ointment 1 application , Topical, 3 Times Daily    ondansetron (ZOFRAN) 4 mg, Oral, Every 12 Hours PRN    pain patient supplied pump Intrathecal, Continuous, <BR>Type of Medication: Hydromorphone  and Bupivacaine<BR>Provider:Dr. Boudreaux<BR>Provider number: [phone]<BR>Basal Dose: Hydromorphone 7.518 mg/day Bupivacaine 0.84593 mg/day<BR>( MAX of Hydromorphone 9.940 mg/ day; Bupivacaine 0.89877 mg /day)<BR>Bolus Dose:Hydromorphone 0.500 mg, Bupivacaine 0.72790 mg<BR>Lockout 3 hours. 1 Bolus per every 3 hours max of 5 bolus per day<BR>Next refill due: July 13 2023<BR>Pump manufacture:[employer]<BR><BR><BR>     SITagliptin (JANUVIA) 25 MG tablet Every 24 Hours       Medications Discontinued During This Encounter   Medication Reason    ALPRAZolam (Xanax) 0.5 MG tablet           "

## 2023-12-15 ENCOUNTER — DOCUMENTATION (OUTPATIENT)
Dept: RADIATION ONCOLOGY | Facility: HOSPITAL | Age: 58
End: 2023-12-15
Payer: COMMERCIAL

## 2023-12-15 NOTE — PROGRESS NOTES
Patient called into clinic today and left voicemail stating that she was having some swelling of her neck.  I returned patients call, she states that she has been having swelling under her chin for the last 2-3 days.  She denies any new cough, any new shortness of air or any changes in her ability to eat or drink.  I expressed my concerns to her and encouraged her to go to the ED to be evaluated due to the possibility of this affecting her breathing.  She states that she has a PET scan on Monday and she was just calling to make us aware.  Again, I encouraged patient to be evaluated in the ED.    I instructed patient that if she did develop any soa, any new cough or the inability to swallow to go to ED.  She verbalized understanding.

## 2023-12-18 ENCOUNTER — HOSPITAL ENCOUNTER (OUTPATIENT)
Dept: PET IMAGING | Facility: HOSPITAL | Age: 58
Discharge: HOME | End: 2023-12-18
Payer: COMMERCIAL

## 2023-12-18 ENCOUNTER — TELEPHONE (OUTPATIENT)
Dept: FAMILY MEDICINE CLINIC | Facility: CLINIC | Age: 58
End: 2023-12-18
Payer: COMMERCIAL

## 2023-12-18 DIAGNOSIS — C32.1 MALIGNANT NEOPLASM OF SUPRAGLOTTIS: ICD-10-CM

## 2023-12-18 PROCEDURE — 0 FLUDEOXYGLUCOSE F18 SOLUTION: Performed by: RADIOLOGY

## 2023-12-18 PROCEDURE — A9552 F18 FDG: HCPCS | Performed by: RADIOLOGY

## 2023-12-18 PROCEDURE — 78815 PET IMAGE W/CT SKULL-THIGH: CPT

## 2023-12-18 RX ADMIN — FLUDEOXYGLUCOSE F 18 1 DOSE: 200 INJECTION, SOLUTION INTRAVENOUS at 13:24

## 2023-12-18 NOTE — TELEPHONE ENCOUNTER
Caller: [employer]    Relationship: Other    Best call back number: [phone]    What form or medical record are you requesting: FORM FAXED ON 12/13/2023    Who is requesting this form or medical record from you: MEDICAL SUPPLIER     How would you like to receive the form or medical records (pick-up, mail, fax):   If fax, what is the fax number: INCLUDED ON FORM  If  Timeframe paperwork needed: AS SOON AS POSSIBLE     Additional notes: NEED TO CONFIRM ORDER WAS RECEIVED AND EXPECTED TURN AROUND TIME FOR IT TO BE RETURNED WITH PROVIDER'S SIGNATURE   ORDER IS FOR WOUND CARE SUPPLIES

## 2023-12-19 ENCOUNTER — OFFICE VISIT (OUTPATIENT)
Dept: RADIATION ONCOLOGY | Facility: HOSPITAL | Age: 58
End: 2023-12-19
Payer: COMMERCIAL

## 2023-12-19 VITALS
OXYGEN SATURATION: 96 % | WEIGHT: 130.07 LBS | SYSTOLIC BLOOD PRESSURE: 90 MMHG | HEART RATE: 94 BPM | BODY MASS INDEX: 20.98 KG/M2 | DIASTOLIC BLOOD PRESSURE: 59 MMHG | TEMPERATURE: 97.8 F

## 2023-12-19 DIAGNOSIS — C32.9 LARYNGEAL CANCER: ICD-10-CM

## 2023-12-19 DIAGNOSIS — C32.1 MALIGNANT NEOPLASM OF SUPRAGLOTTIS: ICD-10-CM

## 2023-12-19 DIAGNOSIS — C32.1 MALIGNANT NEOPLASM OF SUPRAGLOTTIS: Primary | ICD-10-CM

## 2023-12-19 DIAGNOSIS — I89.0 LYMPHEDEMA DUE TO RADIATION: ICD-10-CM

## 2023-12-19 PROCEDURE — G0463 HOSPITAL OUTPT CLINIC VISIT: HCPCS | Performed by: RADIOLOGY

## 2023-12-19 NOTE — TELEPHONE ENCOUNTER
Caller: Isha Llanes    Relationship: Self    Best call back number: [phone]       What was the call regarding: PATIENT IS SCARED TO TAKE NEW MEDICATION - hydroxychloroquine (Plaquenil) 200 MG tablet ; SHE IS AFRAID SHE WILL HAVE A REACTION    PATIENT REQUESTS REFILL OF THE FOLLOWING      Requested Prescriptions:   Requested Prescriptions     Pending Prescriptions Disp Refills    methylPREDNISolone (MEDROL) 4 MG dose pack 21 tablet 0     Sig: Take as directed on package instructions.        Pharmacy where request should be sent: [facility] - [place], [place] - [address] [facility] - [phone]  - [phone]     Last office visit with prescribing clinician: 11/16/2023   Last telemedicine visit with prescribing clinician: 12/13/2023   Next office visit with prescribing clinician: 2/19/2024     Additional details provided by patient: PATIENT REPORTS SHE WILL TRY THE NEW MEDICATION AFTER SHE FINISHES WITH THE STEROID      Maximilian Vee Rep   12/19/23 11:36 EST

## 2023-12-19 NOTE — PROGRESS NOTES
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  This study performed on 12/18/2023 revealed no convincing evidence of metastatic disease.  There is focal skin thickening versus a superficial lesion in the left inguinal soft tissues.  Of note Ms. Llanes has chronic bilateral lower extremity cellulitis.     Review of Systems: Review of Systems   Constitutional:  Positive for appetite change (Decreased, has an altered taste.) and fatigue (10/10). Negative for unexpected weight change.   HENT:  Positive for congestion, rhinorrhea (Yellow/white/clear), sinus pressure, sinus pain, tinnitus (Occasional, ongoing), trouble swallowing (Occasional, with certain foods, dry foods.) and voice change (hoarseness). Negative for sore throat.         States feels like her ears are dry and itchy.  C/o dry mouth   C/o thick secretions/saliva  Altered taste/smell  States feels like her throat and neck are swollen.     Eyes:  Positive for visual disturbance (Occasional blurred and double vision, ongoing).   Respiratory:  Positive for cough (States that she gets gagged on her secretions but doesn't cough it up.), choking (Noted with dry foods) and shortness of breath (occasional, ongoing). Negative for chest tightness.         States that sometimes she has difficulty catching her breath, states feels like it goes along with her throat.     Cardiovascular:  Positive for chest pain (States it is under her left arm, no soa or radiation.). Negative for palpitations and leg swelling.   Gastrointestinal:  Positive for constipation (Educated on stool softeners/fiber.  Last bm yesterday.), diarrhea (Occasional, takes imodium prn) and nausea  (Frequent). Negative for abdominal pain, anal bleeding, blood in stool and vomiting.        States that she feels like she has fluid in her abdomen.  States she feels like her abdomen will gurgle from where the tube was removed.     Genitourinary:  Positive for difficulty urinating (occasional hesitancy). Negative for dysuria, frequency, hematuria and urgency.   Musculoskeletal:  Positive for arthralgias (Right hip and generalized pain), back pain, gait problem (States she has to bear crawl.), neck pain (States has pain in the front of her neck) and neck stiffness (In the front of her neck.).   Skin:  Positive for wound (Noted to bilateral legs.  Wounds generally on face and arms.  States has a sore on her bottom, states her pcp is unaware.). Negative for color change and rash.        Wound (diabetic Ulcer BLE)   Neurological:  Positive for dizziness (Occasional, new), weakness (Generalized, does not have strength) and headaches (Constant). Negative for light-headedness.             Psychiatric/Behavioral:  Positive for sleep disturbance (Due to pain).        The following portions of the patient's history were reviewed and updated as appropriate: allergies, current medications, past family history, past medical history, past social history, past surgical history and problem list.    Medications:     Current Outpatient Medications:     Acetaminophen Extra Strength 500 MG tablet, TAKE 2 TO 3 TABLETS BY MOUTH EVERY 6 HOURS AS NEEDED FOR MILD PAIN, Disp: 60 tablet, Rfl: 3    albuterol sulfate  (90 Base) MCG/ACT inhaler, Inhale 2 puffs Every 4 (Four) Hours As Needed for Wheezing., Disp: 18 g, Rfl: 2    ALPRAZolam (Xanax) 0.5 MG tablet, Take 1 tablet by mouth 2 (Two) Times a Day As Needed for Anxiety for up to 60 days., Disp: 60 tablet, Rfl: 2    B-D UF III MINI PEN NEEDLES 31G X 5 MM misc, Inject 100 each under the skin into the appropriate area as directed 4 (Four) Times a Day., Disp: 100 each, Rfl: 12     "bacitracin 500 UNIT/GM ointment, Apply 1 application  topically to the appropriate area as directed 2 (Two) Times a Day., Disp: 28 g, Rfl: 5    Elastic Bandages & Supports (ACE Elastic Bandage 4\") misc, 1 Device Daily. One on each leg, bilateral.   #24 = one month supply., Disp: 24 each, Rfl: 3    furosemide (LASIX) 40 MG tablet, Take 1 tablet by mouth Daily., Disp: 90 tablet, Rfl: 1    gabapentin (NEURONTIN) 300 MG capsule, TAKE ONE CAPSULE BY MOUTH THREE TIMES DAILY (Patient taking differently: Take 1 capsule by mouth 4 (Four) Times a Day As Needed.), Disp: 270 capsule, Rfl: 1    HYDROmorphone (DILAUDID) 1 mg/mL solution, Infuse  into a venous catheter Dose Adjusted By Provider As Needed. PER PAIN PUMP, Disp: , Rfl:     mupirocin (BACTROBAN) 2 % ointment, Apply 1 application  topically to the appropriate area as directed 3 (Three) Times a Day., Disp: 30 g, Rfl: 2    ondansetron (Zofran) 4 MG tablet, Take 1 tablet by mouth Every 12 (Twelve) Hours As Needed for Nausea or Vomiting., Disp: 60 tablet, Rfl: 1    pain patient supplied pump, by Intrathecal route Continuous.  Type of Medication: Hydromorphone  and Bupivacaine Provider:Dr. Boudreaux Provider number: [phone] Basal Dose: Hydromorphone 7.518 mg/day Bupivacaine 0.35897 mg/day ( MAX of Hydromorphone 9.940 mg/ day; Bupivacaine 0.11722 mg /day) Bolus Dose:Hydromorphone 0.500 mg, Bupivacaine 0.79055 mg Lockout 3 hours. 1 Bolus per every 3 hours max of 5 bolus per day Next refill due: July 13 2023 Pump manufacture:[employer], Disp: , Rfl:     cetirizine (zyrTEC) 10 MG tablet, Take 1 tablet by mouth Daily. (Patient not taking: Reported on 12/19/2023), Disp: , Rfl:     clopidogrel (PLAVIX) 75 MG tablet, Take 1 tablet by mouth Daily. (Patient not taking: Reported on 12/19/2023), Disp: , Rfl:     FREESTYLE LITE test strip, by Other route 4 (Four) Times a Day. Use to test blood sugar 4 times daily (Patient not taking: Reported on 12/19/2023), Disp: , Rfl:     glucose " "blood test strip, Use as instructed (Patient not taking: Reported on 12/19/2023), Disp: 100 each, Rfl: 12    glucose monitor monitoring kit, 1 each As Needed (Test blood sugar TID). (Patient not taking: Reported on 12/19/2023), Disp: 1 each, Rfl: 0    HYDROcodone-acetaminophen (NORCO)  MG per tablet, Take 1 tablet by mouth Every 6 (Six) Hours As Needed for Moderate Pain. (Patient not taking: Reported on 12/19/2023), Disp: 50 tablet, Rfl: 0    HYDROmorphone HCl (DILAUDID) 0.2 MG/ML injection, HYDROmorphone HCl (Patient not taking: Reported on 12/19/2023), Disp: , Rfl:     hydroxychloroquine (Plaquenil) 200 MG tablet, Take 1 tablet by mouth Daily for 90 days. Indications: Rheumatoid Arthritis (Patient not taking: Reported on 12/19/2023), Disp: 30 tablet, Rfl: 2    insulin detemir (LEVEMIR) 100 UNIT/ML injection, Inject 10 Units under the skin into the appropriate area as directed Daily for 90 days., Disp: 9 mL, Rfl: 3    Insulin Lispro, 1 Unit Dial, (HUMALOG) 100 UNIT/ML solution pen-injector, Inject 100 Units as directed Daily With Dinner. Inject 1to 10 units with meals per sliding scale, max of 30 units per day (Patient not taking: Reported on 12/19/2023), Disp: 15 mL, Rfl: 1    Insulin Syringe 31G X 5/16\" 1 ML misc, 1 each Daily. (Patient not taking: Reported on 12/19/2023), Disp: 100 each, Rfl: 2    Lancets misc, 1 each 3 (Three) Times a Day. (Patient not taking: Reported on 12/19/2023), Disp: 100 each, Rfl: 12    loperamide (IMODIUM) 2 MG capsule, Take 1 capsule by mouth 4 (Four) Times a Day As Needed for Diarrhea. (Patient not taking: Reported on 12/19/2023), Disp: 40 capsule, Rfl: 0    methylPREDNISolone (MEDROL) 4 MG dose pack, Take as directed on package instructions. (Patient not taking: Reported on 12/19/2023), Disp: 21 tablet, Rfl: 0    SITagliptin (JANUVIA) 25 MG tablet, Daily. (Patient not taking: Reported on 11/16/2023), Disp: , Rfl:     Allergies:   Allergies   Allergen Reactions    Amoxicillin " Shortness Of Breath    Ceclor [Cefaclor] Shortness Of Breath    Penicillins Shortness Of Breath    Sulfa Antibiotics Rash    Valium [Diazepam] Mental Status Change     DEPRESSED    Ambien [Zolpidem] Mental Status Change    Aspirin GI Intolerance    Ativan [Lorazepam] Mental Status Change    Benadryl [Diphenhydramine] Anxiety     AND MAKES HER HYPER    Biaxin [Clarithromycin] Unknown - Low Severity    Cefazolin Unknown - Low Severity    Cephalexin Unknown - Low Severity    Cephalosporins Unknown - Low Severity    Clindamycin Unknown - Low Severity    Compazine [Prochlorperazine Edisylate] Rash    Contrast Dye (Echo Or Unknown Ct/Mr) Other (See Comments)     Caused pain in her arm    Doxycycline Rash    Eggs Or Egg-Derived Products GI Intolerance    Nsaids GI Intolerance    Phenergan [Promethazine Hcl] GI Intolerance    Promethazine GI Intolerance    Vancomycin Itching       ECOG: (2) Ambulatory and capable of self care, unable to carry out work activity, up and about > 50% or waking hours  Quality of Life: 40 - Must Use Wheelchair Most of Time     Objective     Physical Exam:   Vital Signs:   Vitals:    12/19/23 1322   BP: 90/59   Pulse: 94   Temp: 97.8 °F (36.6 °C)   TempSrc: Temporal   SpO2: 96%   Weight: 59 kg (130 lb 1.1 oz)   PainSc:   8   PainLoc: Back     Body mass index is 20.98 kg/m².     Physical Exam  Constitutional:       General: She is not in acute distress.     Appearance: Normal appearance. She is not toxic-appearing.   HENT:      Head: Normocephalic and atraumatic.   Neck:      Comments: Development of submental lymphedema  Pulmonary:      Effort: Pulmonary effort is normal. No respiratory distress.   Neurological:      General: No focal deficit present.      Mental Status: She is alert and oriented to person, place, and time.      Cranial Nerves: No cranial nerve deficit.   Psychiatric:         Mood and Affect: Mood normal.         Behavior: Behavior normal.         Judgment: Judgment normal.          Radiographs: NM PET/CT Skull Base to Mid Thigh    Result Date: 12/19/2023    1. No convincing PET-CT evidence of metastatic disease. 2. Focal skin thickening versus superficial lesion in the left inguinal soft tissues, demonstrating hypermetabolic activity (max SUV 8.3).  This could be infectious or inflammatory, however correlate with physical exam to exclude a skin lesion.  Fairly symmetric bilateral inguinal lymph nodes without significant FDG activity. 3. Centrilobular emphysema.  Nodular/patchy left lower and right upper lobe airspace opacities suspicious for developing pneumonia /aspiration versus pneumonitis.     CHANDAN PEREZ MD       Electronically Signed and Approved By: CHANDAN PEREZ MD on 12/19/2023 at 9:10             CT Soft Tissue Neck Without Contrast    Result Date: 10/26/2023    1. No evidence of cervical lymphadenopathy.  No discrete soft tissue mass seen within the neck 2. Stable bony sclerosis of the C5, C6, and C7 vertebral bodies.     JEOVANY TALBERT MD       Electronically Signed and Approved By: JEOVANY TALBERT MD on 10/26/2023 at 12:00             I personally reviewed the PET/CT performed on 12/18/2023.  The pertinent findings are as above in HPI.      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  She has involvement of the laryngeal side of the epiglottis.  She has no clinical or radiographic evidence of regional or distant metastatic disease.  ECOG 2    I explained the findings of the recent PET/CT with Ms. Llanes.  I recommended follow-up in 3 months with nasopharyngoscopy at that time.  She will follow-up with her primary care physician regarding bilateral lower extremity cellulitis.  Ms. Llanes was inquiring as to the potential for her to receive IV antibiotics at home.  I will defer this management to Dr. Killian.  I have referred Ms.  Shraddha to lymphedema therapy.  She was encouraged to contact me with any questions or concerns regarding her care.      Goyo Nunez MD  Radiation Oncology  [facility]    This document has been signed by Goyo Nunez MD on December 19, 2023 17:18 EST

## 2023-12-20 ENCOUNTER — TELEPHONE (OUTPATIENT)
Dept: FAMILY MEDICINE CLINIC | Facility: CLINIC | Age: 58
End: 2023-12-20

## 2023-12-20 RX ORDER — METHYLPREDNISOLONE 4 MG/1
TABLET ORAL
Qty: 21 TABLET | Refills: 0 | Status: SHIPPED | OUTPATIENT
Start: 2023-12-20

## 2023-12-20 NOTE — TELEPHONE ENCOUNTER
Hub to relay: We have sent the wound care paperwork in and her DME for her wheelchair and hospital bed

## 2023-12-20 NOTE — TELEPHONE ENCOUNTER
Caller: LINDA    Relationship: Other    Best call back number: [phone]    What was the call regarding: LISA WITH [facility] STATES THAT A FAX WAS SENT ON THIS PATIENT BACK IN SEPTEMBER AND AGAIN ON DECEMBER 18TH FOR MEDICAL SUPPLIES WITH NO RESPONSE FROM THE OFFICE. SHE STATES THAT PATIENTS ORDER WILL CANCEL IF NO RESPONSE IS RECEIVED BY FRIDAY.

## 2023-12-22 ENCOUNTER — TELEPHONE (OUTPATIENT)
Dept: FAMILY MEDICINE CLINIC | Facility: CLINIC | Age: 58
End: 2023-12-22
Payer: COMMERCIAL

## 2023-12-22 NOTE — TELEPHONE ENCOUNTER
[facility] called stating they have some questions that need to be answered soon     Please advise

## 2023-12-29 ENCOUNTER — TELEPHONE (OUTPATIENT)
Dept: FAMILY MEDICINE CLINIC | Facility: CLINIC | Age: 58
End: 2023-12-29
Payer: COMMERCIAL

## 2023-12-29 DIAGNOSIS — C32.1 MALIGNANT NEOPLASM OF SUPRAGLOTTIS: ICD-10-CM

## 2023-12-29 NOTE — TELEPHONE ENCOUNTER
Caller: Isha Llanes    Relationship: Self    Best call back number: [phone]    Requested Prescriptions:     methylPREDNISolone (MEDROL) 4 MG dose pack          Pharmacy where request should be sent:  [facility] - [place] - [address] - [phone]  - [phone]     Last office visit with prescribing clinician: 11/16/2023   Last telemedicine visit with prescribing clinician: 12/13/2023   Next office visit with prescribing clinician: 1/4/2024     Additional details provided by patient: PATIENT IS REQUESTING A CALL BACK AND STATES SHE IS OUT OF STEROID    Does the patient have less than a 3 day supply:  [x] Yes  [] No    Would you like a call back once the refill request has been completed: [x] Yes [] No    If the office needs to give you a call back, can they leave a voicemail: [x] Yes [] No    Maximilian Murrieta Rep   12/29/23 10:54 EST

## 2024-01-03 ENCOUNTER — TELEPHONE (OUTPATIENT)
Dept: FAMILY MEDICINE CLINIC | Facility: CLINIC | Age: 59
End: 2024-01-03

## 2024-01-03 ENCOUNTER — TELEPHONE (OUTPATIENT)
Dept: FAMILY MEDICINE CLINIC | Facility: CLINIC | Age: 59
End: 2024-01-03
Payer: COMMERCIAL

## 2024-01-03 NOTE — TELEPHONE ENCOUNTER
Provider: YAMILETH ESTEVES      Caller: HALEY - [facility]     Relationship to Patient: HOME HEALTH           Phone Number: [phone]    Reason for Call:         HALEY SAID SHE WENT TO SEE THE PATIENT YESTERDAY 1/2/24 AND CERTIFIED THE PATIENT TO BE SEEN TWICE A WEEK FOR HOME CARE

## 2024-01-03 NOTE — TELEPHONE ENCOUNTER
Hub staff attempted to follow warm transfer process and was unsuccessful     Caller: Isha Llanes    Relationship to patient: Self    Best call back number: [phone]    Patient is needing: PATIENT NEEDS HER MEDICATION SENT TO PHARMACY TODAY ASAP BECAUSE SHE IS COMPLETELY OUT OF THIS MEDICATION.  PLEASE SEND NEW PRESCRIPTION TO PHARMACY ASAP SHE IS NOT FEELING WELL AT ALL.    methylPREDNISolone (MEDROL) 4 MG dose pack   [facility] - [place], [place] - [address] [facility] - [phone] [facility] [phone]  [phone]

## 2024-01-03 NOTE — TELEPHONE ENCOUNTER
"[facility] pharmacy called stating pt called them 12 times today stating she is supposed to get a steroid. I looked in pt's chart. She was prescribed one on 12/20/23. I spoke with Dr. Wright's MA and she stated Dr. Wright already said she would not give her another one this soon as it is not good for her. I let the pharmacy staff know. I called patient and relayed this information. She asked, \"Why are you all being so hateful?\" I asked her what she meant by that. She stated she is in pain and we won't give her more medicine. I relayed to her what Dr. Wright had said. She stated she has an appt tomorrow and I advised her to discuss this at her appt. She verbalized understanding.  "

## 2024-01-04 ENCOUNTER — TELEPHONE (OUTPATIENT)
Dept: FAMILY MEDICINE CLINIC | Facility: CLINIC | Age: 59
End: 2024-01-04

## 2024-01-04 RX ORDER — METHYLPREDNISOLONE 4 MG/1
TABLET ORAL
Qty: 21 TABLET | Refills: 0 | OUTPATIENT
Start: 2024-01-04

## 2024-01-04 NOTE — TELEPHONE ENCOUNTER
Caller: Isha Llanes    Relationship: Self    Best call back number: [phone]     What is the best time to reach you: ANYTIME     Who are you requesting to speak with (clinical staff, provider,  specific staff member): CLINICAL       What was the call regarding: PATIENT IS CALLING TO CHECK THE STATUS OF A MEDICATION TO BE SENT OVER TO THE PHARMACY, PATIENT WOULD LIKE TO BE CONTACTED ONCE IT IS SENT OVER FOR TRANSPORTATION REASONS.

## 2024-01-09 ENCOUNTER — OFFICE VISIT (OUTPATIENT)
Dept: FAMILY MEDICINE CLINIC | Facility: CLINIC | Age: 59
End: 2024-01-09
Payer: COMMERCIAL

## 2024-01-09 VITALS
OXYGEN SATURATION: 90 % | SYSTOLIC BLOOD PRESSURE: 100 MMHG | HEART RATE: 99 BPM | WEIGHT: 130 LBS | HEIGHT: 66 IN | TEMPERATURE: 97.3 F | BODY MASS INDEX: 20.89 KG/M2 | DIASTOLIC BLOOD PRESSURE: 70 MMHG

## 2024-01-09 DIAGNOSIS — M32.9 LUPUS: ICD-10-CM

## 2024-01-09 DIAGNOSIS — R22.1 NECK SWELLING: ICD-10-CM

## 2024-01-09 DIAGNOSIS — F41.1 GAD (GENERALIZED ANXIETY DISORDER): ICD-10-CM

## 2024-01-09 DIAGNOSIS — C32.1 MALIGNANT NEOPLASM OF SUPRAGLOTTIS: ICD-10-CM

## 2024-01-09 DIAGNOSIS — F41.9 ANXIETY: ICD-10-CM

## 2024-01-09 DIAGNOSIS — J44.1 COPD WITH EXACERBATION: ICD-10-CM

## 2024-01-09 DIAGNOSIS — M19.90 INFLAMMATORY ARTHRITIS: ICD-10-CM

## 2024-01-09 DIAGNOSIS — M16.9 OSTEOARTHRITIS OF HIP, UNSPECIFIED LATERALITY, UNSPECIFIED OSTEOARTHRITIS TYPE: Primary | ICD-10-CM

## 2024-01-09 DIAGNOSIS — G89.3 CHRONIC PAIN DUE TO NEOPLASM: ICD-10-CM

## 2024-01-09 RX ORDER — HYDROCODONE BITARTRATE AND ACETAMINOPHEN 5; 325 MG/1; MG/1
1 TABLET ORAL AS NEEDED
Qty: 30 TABLET | Refills: 0 | Status: SHIPPED | OUTPATIENT
Start: 2024-01-09 | End: 2024-02-08

## 2024-01-09 RX ORDER — PREDNISONE 5 MG/1
5 TABLET ORAL DAILY
Qty: 30 TABLET | Refills: 2 | Status: SHIPPED | OUTPATIENT
Start: 2024-01-09 | End: 2024-02-08

## 2024-01-09 RX ORDER — AZITHROMYCIN 250 MG/1
TABLET, FILM COATED ORAL
Qty: 6 TABLET | Refills: 0 | Status: SHIPPED | OUTPATIENT
Start: 2024-01-09

## 2024-01-09 RX ORDER — AZITHROMYCIN 250 MG/1
TABLET, FILM COATED ORAL
Qty: 6 TABLET | Refills: 0 | Status: SHIPPED | OUTPATIENT
Start: 2024-01-09 | End: 2024-01-09 | Stop reason: SDUPTHER

## 2024-01-09 RX ORDER — ALPRAZOLAM 0.5 MG/1
0.5 TABLET ORAL 2 TIMES DAILY PRN
Qty: 60 TABLET | Refills: 0 | Status: SHIPPED | OUTPATIENT
Start: 2024-01-19 | End: 2024-03-19

## 2024-01-09 RX ORDER — PREDNISONE 5 MG/1
5 TABLET ORAL DAILY
Qty: 30 TABLET | Refills: 2 | Status: SHIPPED | OUTPATIENT
Start: 2024-01-09 | End: 2024-01-09 | Stop reason: SDUPTHER

## 2024-01-09 NOTE — PROGRESS NOTES
Chief Complaint  Follow-up    Subjective      History of Present Illness  Isha Llanes is a 58 y.o. 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  This that a PEG tube placed on 7/26/2023, removed on 10/6/2023.  Patient received external beam radiotherapy, and completed that with Dr. Nunez.  She had her PET scan in December which showed no metastatic disease.  Pt has severe muscle atrophy from chemotherapy and critical illness myopathy, she has been in a wheelchair for about 5 years.  Patient is currently complaining that she was previously diagnosed with lupus I just met her at the last visit and she has continued to request steroids and states that previous doctors put her on steroids and it improved all her joint pains.  She complains of wrist, elbow, shoulder, and knuckles hurting.  She states the only thing that is ever made her feel better with steroids.  She was previously placed on Norco and is requesting that and is currently in tears and states it is the only thing that is helping her get through her current daily daily activities she does not have a power wheelchair we did request that at the last visit.  She does not have any support at home she is currently losing coreas for eviction.       Past Medical History:   Diagnosis Date    Anesthesia     REPORTS HAS GOT REAL EMOTIONAL AND HAS BECOME ANGRY BEFORE    Anxiety     Aortic stenosis, severe     HAS BIO VALVE REPLACED    Arthritis     Asthma     Back pain     Blood clotting disorder     Cancer     Cancer associated pain     HODGKINS LYMPHOMA    CHF (congestive heart failure)     Chronic low back pain with sciatica     Chronic pain disorder     COPD (chronic obstructive pulmonary disease)     Coronary artery disease     Diabetes mellitus     TYPE 2    Diabetes mellitus     Dyspnea on effort   "   GERD (gastroesophageal reflux disease)     H/O lumpectomy     H/O: hysterectomy     Hiatal hernia     History of degenerative disc disease     History of kidney stones     History of pulmonary embolus (PE)     History of transfusion     AS A CHILD NO TRANSFUSION REACTION    History of transfusion     Hodgkin's lymphoma     5/19/23  5 YEARS SINCE LAST CHEMO TX    Hypertension     Immobility     Liver disease     DENIES ANY CURRENT ISSUES    Lupus     Mitral valve prolapse     Panic disorder     Pelvic pain     Peripheral neuropathy     Personal history of DVT (deep vein thrombosis)     Presence of intrathecal pump     PTSD (post-traumatic stress disorder)     Sacroiliac joint disease     SI (sacroiliac) joint inflammation     Sleep apnea     Venous insufficiency          Objective   Vital Signs:   Vitals:    01/09/24 1034   BP: 100/70   Pulse: 99   Temp: 97.3 °F (36.3 °C)   SpO2: 90%   Weight: 59 kg (130 lb)   Height: 167.7 cm (66.02\")     Body mass index is 20.97 kg/m².    Wt Readings from Last 3 Encounters:   01/09/24 59 kg (130 lb)   12/19/23 59 kg (130 lb 1.1 oz)   12/13/23 56.7 kg (125 lb)     BP Readings from Last 3 Encounters:   01/09/24 100/70   12/19/23 90/59   11/16/23 102/70       Health Maintenance   Topic Date Due    Hepatitis B (1 of 3 - 3-dose series) Never done    COVID-19 Vaccine (1) Never done    Pneumococcal Vaccine 0-64 (1 of 2 - PCV) Never done    TDAP/TD VACCINES (1 - Tdap) Never done    ZOSTER VACCINE (1 of 2) Never done    ANNUAL PHYSICAL  Never done    DIABETIC FOOT EXAM  Never done    PAP SMEAR  03/29/2017    DIABETIC EYE EXAM  Never done    MAMMOGRAM  01/08/2021    INFLUENZA VACCINE  Never done    LIPID PANEL  01/12/2024    URINE MICROALBUMIN  01/12/2024    HEMOGLOBIN A1C  05/29/2024    LUNG CANCER SCREENING  09/05/2024    COLORECTAL CANCER SCREENING  04/13/2032    HEPATITIS C SCREENING  Completed       Physical Exam   Constitutional:       General: She is not in acute distress.     " Appearance: Normal appearance. She is not toxic-appearing.   HENT:      Head: Normocephalic and atraumatic.   Neck:      Comments: Development of submental lymphedema continues to be present  Pulmonary:      Effort: Pulmonary effort is normal. No respiratory distress.   Neurological:      General: No focal deficit present.      Mental Status: She is alert and oriented to person, place, and time.      Cranial Nerves: No cranial nerve deficit.   Psychiatric:         Mood and Affect: Mood normal.         Behavior: Behavior normal.         Judgment: Judgment normal.     Result Review :  The following data was reviewed by: Katia Wright MD on 01/09/2024:      Procedures        Assessment and Plan   Diagnoses and all orders for this visit:    1. Osteoarthritis of hip, unspecified laterality, unspecified osteoarthritis type (Primary)  -     Discontinue: predniSONE (DELTASONE) 5 MG tablet; Take 1 tablet by mouth Daily for 30 days.  Dispense: 30 tablet; Refill: 2  -     Ambulatory Referral to Rheumatology  -     predniSONE (DELTASONE) 5 MG tablet; Take 1 tablet by mouth Daily for 30 days.  Dispense: 30 tablet; Refill: 2    2. Lupus  -     Discontinue: predniSONE (DELTASONE) 5 MG tablet; Take 1 tablet by mouth Daily for 30 days.  Dispense: 30 tablet; Refill: 2  -     Ambulatory Referral to Rheumatology    3. Inflammatory arthritis  -     Discontinue: predniSONE (DELTASONE) 5 MG tablet; Take 1 tablet by mouth Daily for 30 days.  Dispense: 30 tablet; Refill: 2  -     Ambulatory Referral to Rheumatology  -     predniSONE (DELTASONE) 5 MG tablet; Take 1 tablet by mouth Daily for 30 days.  Dispense: 30 tablet; Refill: 2    4. Neck swelling    5. Malignant neoplasm of supraglottis  -     Ambulatory Referral to Hematology / Oncology    6. COPD with exacerbation  -     Discontinue: azithromycin (ZITHROMAX) 250 MG tablet; Take 2 tablets PO the first day, then 1 tablet PO daily for 4 days.  Dispense: 6 tablet; Refill: 0  -      azithromycin (ZITHROMAX) 250 MG tablet; Take 2 tablets PO the first day, then 1 tablet PO daily for 4 days.  Dispense: 6 tablet; Refill: 0    7. JEFFREY (generalized anxiety disorder)    8. Anxiety  -     ALPRAZolam (Xanax) 0.5 MG tablet; Take 1 tablet by mouth 2 (Two) Times a Day As Needed for Anxiety for up to 60 days.  Dispense: 60 tablet; Refill: 0    9. Chronic pain due to neoplasm  -     HYDROcodone-acetaminophen (Norco) 5-325 MG per tablet; Take 1 tablet by mouth As Needed for Moderate Pain for up to 30 days.  Dispense: 30 tablet; Refill: 0        BMI is within normal parameters. No other follow-up for BMI required.  With her history of inflammatory arthritis as she states and previous use of long-term steroids we will go ahead and prescribe low-dose 5 mg of prednisone daily.  I did refer to rheumatology.  Did refill chronic pain medicine secondary to neoplasm for 30 days we will see how she does after that.  Continue Xanax for generalized anxiety disorder twice daily.  As needed  Patient had some wheezing on exam will prescribe azithromycin for possible COPD exacerbation.  We will check on her powerchair today  FOLLOW UP  Return in about 3 months (around 4/9/2024).  Patient was given instructions and counseling regarding her condition or for health maintenance advice. Please see specific information pulled into the AVS if appropriate.       Katia Wright MD  01/09/24  11:48 EST    CURRENT & DISCONTINUED MEDICATIONS  Current Outpatient Medications   Medication Instructions    Acetaminophen Extra Strength 500 MG tablet TAKE 2 TO 3 TABLETS BY MOUTH EVERY 6 HOURS AS NEEDED FOR MILD PAIN    albuterol sulfate  (90 Base) MCG/ACT inhaler 2 puffs, Inhalation, Every 4 Hours PRN    [START ON 1/19/2024] ALPRAZolam (XANAX) 0.5 mg, Oral, 2 Times Daily PRN    azithromycin (ZITHROMAX) 250 MG tablet Take 2 tablets PO the first day, then 1 tablet PO daily for 4 days.    B-D UF III MINI PEN NEEDLES 31G X 5 MM misc 100  "each, Subcutaneous, 4 Times Daily    bacitracin 0.9 g, Topical, 2 Times Daily    cetirizine (zyrTEC) 10 MG tablet 1 tablet, Oral, Daily    clopidogrel (PLAVIX) 75 MG tablet 1 tablet, Oral, Daily    Elastic Bandages & Supports (ACE Elastic Bandage 4\") misc 1 Device, Does not apply, Daily, One on each leg, bilateral.   #24 = one month supply.    furosemide (LASIX) 40 mg, Oral, Daily    gabapentin (NEURONTIN) 300 MG capsule TAKE ONE CAPSULE BY MOUTH THREE TIMES DAILY    glucose blood test strip Use as instructed    glucose monitor monitoring kit 1 each, Does not apply, As Needed    HYDROcodone-acetaminophen (Norco) 5-325 MG per tablet 1 tablet, Oral, As Needed    HYDROmorphone (DILAUDID) 1 mg/mL solution Intravenous, Titrated, PER PAIN PUMP    HYDROmorphone HCl (DILAUDID) 0.2 MG/ML injection     hydroxychloroquine (PLAQUENIL) 200 mg, Oral, Daily    insulin detemir (LEVEMIR) 10 Units, Subcutaneous, Daily    Insulin Lispro (1 Unit Dial) (HUMALOG) 100 Units, Injection, Daily With Dinner, Inject 1to 10 units with meals per sliding scale, max of 30 units per day    Insulin Syringe 31G X 5/16\" 1 ML misc 1 each, Does not apply, Daily    Lancets misc 1 each, Does not apply, 3 Times Daily    loperamide (IMODIUM) 2 mg, Oral, 4 Times Daily PRN    mupirocin (BACTROBAN) 2 % ointment 1 application , Topical, 3 Times Daily    ondansetron (ZOFRAN) 4 mg, Oral, Every 12 Hours PRN    pain patient supplied pump Intrathecal, Continuous, <BR>Type of Medication: Hydromorphone  and Bupivacaine<BR>Provider:Dr. Boudreaux<BR>Provider number: [phone]<BR>Basal Dose: Hydromorphone 7.518 mg/day Bupivacaine 0.42829 mg/day<BR>( MAX of Hydromorphone 9.940 mg/ day; Bupivacaine 0.59767 mg /day)<BR>Bolus Dose:Hydromorphone 0.500 mg, Bupivacaine 0.87788 mg<BR>Lockout 3 hours. 1 Bolus per every 3 hours max of 5 bolus per day<BR>Next refill due: July 13 2023<BR>Pump manufacture:[employer]<BR><BR><BR>     predniSONE (DELTASONE) 5 mg, Oral, Daily    SITagliptin " (JANUVIA) 25 MG tablet Every 24 Hours       Medications Discontinued During This Encounter   Medication Reason    methylPREDNISolone (MEDROL) 4 MG dose pack     ALPRAZolam (Xanax) 0.5 MG tablet Reorder    azithromycin (ZITHROMAX) 250 MG tablet Reorder    predniSONE (DELTASONE) 5 MG tablet Reorder    HYDROcodone-acetaminophen (NORCO)  MG per tablet

## 2024-01-10 ENCOUNTER — TELEPHONE (OUTPATIENT)
Dept: FAMILY MEDICINE CLINIC | Facility: CLINIC | Age: 59
End: 2024-01-10
Payer: COMMERCIAL

## 2024-01-15 ENCOUNTER — TELEPHONE (OUTPATIENT)
Dept: FAMILY MEDICINE CLINIC | Facility: CLINIC | Age: 59
End: 2024-01-15
Payer: COMMERCIAL

## 2024-01-15 NOTE — TELEPHONE ENCOUNTER
Caller: [facility]    Best call back number: [phone]     Who are you requesting to speak with (clinical staff, provider,  specific staff member): MEDICAL STAFF    Do you know the name of the person who called: GIULIANO    What was the call regarding: GIULIANO IS REQUESTING A VERBAL TO BE ABLE TO CONTINUE SEEING THE PATIENT TWICE A WEEK AND FOR A   TO SEE THE PATIENT TO BE ABLE TO EVALUATE HER FOR MORE ASSISTANCE IN THE HOME. IF THE MEDICAL STAFF IS UNABLE TO REACH GIULIANO, A VOICEMAIL CAN BE LEFT.

## 2024-01-15 NOTE — TELEPHONE ENCOUNTER
Called and spoke with Dorita about seeing pt twice weekly as they are now, I let her know that that would be fine.

## 2024-01-16 ENCOUNTER — TELEPHONE (OUTPATIENT)
Dept: FAMILY MEDICINE CLINIC | Facility: CLINIC | Age: 59
End: 2024-01-16
Payer: COMMERCIAL

## 2024-01-16 ENCOUNTER — TELEPHONE (OUTPATIENT)
Dept: FAMILY MEDICINE CLINIC | Facility: CLINIC | Age: 59
End: 2024-01-16

## 2024-01-16 NOTE — TELEPHONE ENCOUNTER
Caller: Isha Llanes    Relationship: Self    Best call back number: [phone]    What was the call regarding:   PATIENT WOULD LIKE TO TAKE THIS TWICE A DAY AS THE FOUR TIMES A DAY MAKES HER VERY SLEEPY. SHE HAS BEEN TAKING IT FOR TWICE A DAY AND IT HAS HELPED WITH HER LEGS.      HER PHARMACY IS  [facility] - [place], [place] - [address] [facility] - [phone]  - [phone]  [phone]

## 2024-01-16 NOTE — TELEPHONE ENCOUNTER
Caller:   RENÉE ABARCA     Relationship: SELF     Best call back number:   [phone]     Who are you requesting to speak with (clinical staff, provider,  specific staff member):   CLINICAL      What was the call regarding:   PATIENT IS REQUESTING A LOWER DOSE OF HER GABAPENTIN. PLEASE CALL AND ADVISE.

## 2024-01-22 ENCOUNTER — TELEPHONE (OUTPATIENT)
Dept: FAMILY MEDICINE CLINIC | Facility: CLINIC | Age: 59
End: 2024-01-22

## 2024-01-22 DIAGNOSIS — R11.2 NAUSEA AND VOMITING IN ADULT: Primary | ICD-10-CM

## 2024-01-22 RX ORDER — ONDANSETRON 4 MG/1
4 TABLET, FILM COATED ORAL EVERY 12 HOURS PRN
Qty: 60 TABLET | Refills: 1 | Status: SHIPPED | OUTPATIENT
Start: 2024-01-22

## 2024-01-22 NOTE — TELEPHONE ENCOUNTER
Pt called to say that she has been taking her steroids wrong and she is now about out. She said that she had been taking them 3 times a day and she should have only been taking it once a day. She also said that she has been breathing in bug spray where her landlord has been spraying for bug and now she is having some gurgling in her chest but is is not bad enough to go to the ER. She is asking for a call back from a MA.

## 2024-01-23 ENCOUNTER — TELEPHONE (OUTPATIENT)
Dept: FAMILY MEDICINE CLINIC | Facility: CLINIC | Age: 59
End: 2024-01-23
Payer: COMMERCIAL

## 2024-01-23 NOTE — TELEPHONE ENCOUNTER
Caller:  RENÉE ABARCA     Relationship:  SELF     Best call back number:  [phone]     Who are you requesting to speak with (clinical staff, provider,  specific staff member):   CLINICAL     What was the call regarding:      PATIENT CALLED YESTERDAY AND AGAIN TODAY. SHE IS OUT OF HER STEROIDS AND NEEDING SOMETHING. SHE WOULD LIKE TO KNOW IF DR. ESTEVES WOULD CALL HER IN A STEROID PACK INSTEAD OF THE ( ONE A DAY )   SHE SAYS SHE DOES BETTER WITH THAT.

## 2024-01-24 NOTE — TELEPHONE ENCOUNTER
Hub to relay: Called pt to let her know Dr. Wright was not going to send in anymore steroids for her because it said on the bottle how many to take daily and she was taking medication wrong.

## 2024-01-24 NOTE — TELEPHONE ENCOUNTER
Caller: Isha Llanes    Relationship: Self    Best call back number: [phone]    What was the call regarding: PATIENT STATES SHE REALLY NEEDS A CALL REGARDING THIS ISSUE ASAP.

## 2024-01-29 ENCOUNTER — TELEPHONE (OUTPATIENT)
Dept: FAMILY MEDICINE CLINIC | Facility: CLINIC | Age: 59
End: 2024-01-29
Payer: COMMERCIAL

## 2024-01-29 NOTE — TELEPHONE ENCOUNTER
Caller:   THOMPSON     Relationship:  HOME HEALTH     Best call back number:  [phone]     Who are you requesting to speak with (clinical staff, provider,  specific staff member):   CLINICAL     What was the call regarding:  THOMPSON IS STATING THAT PATIENT IS STATING THAT SHE IS ALLERGIC TO THE CREAM THAT THEY HAVE BEEN PUTTING ON HER WOUNDS. THE WOUNDS LOOK MUCH SMALLER THOUGH.   THOMPSON IS SEEING HER ONCE A MONTH AND STATING THAT EVERY VISIT, THE PATIENT IS REFUSING TO HAVE HER PT/INR CHECKED, SAYING SHE IS NOT DIABETIC AND DOESN'T KNOW WHY THE PHARMACY KEEPS FILLING HER MEDICATION FOR IT, PLEASE CALL THOMPSON AND ADVISE

## 2024-01-30 DIAGNOSIS — L97.212 VENOUS STASIS ULCER OF RIGHT CALF WITH FAT LAYER EXPOSED WITHOUT VARICOSE VEINS: ICD-10-CM

## 2024-01-30 DIAGNOSIS — L97.222 VENOUS STASIS ULCER OF LEFT CALF WITH FAT LAYER EXPOSED WITHOUT VARICOSE VEINS: ICD-10-CM

## 2024-01-30 DIAGNOSIS — I87.2 VENOUS STASIS ULCER OF LEFT CALF WITH FAT LAYER EXPOSED WITHOUT VARICOSE VEINS: ICD-10-CM

## 2024-01-30 DIAGNOSIS — I87.2 VENOUS STASIS ULCER OF RIGHT CALF WITH FAT LAYER EXPOSED WITHOUT VARICOSE VEINS: ICD-10-CM

## 2024-01-30 RX ORDER — GINSENG 100 MG
1 CAPSULE ORAL 2 TIMES DAILY
Qty: 28 G | Refills: 5 | Status: ON HOLD | OUTPATIENT
Start: 2024-01-30

## 2024-01-30 NOTE — TELEPHONE ENCOUNTER
SPOKE WITH THOMPSON FROM HOME HEALTH ABOUT MS. ABARCA WE DISCUSSED HER WOUND AND HAVE SENT OVER HER MEDICATION.

## 2024-02-05 ENCOUNTER — APPOINTMENT (OUTPATIENT)
Dept: GENERAL RADIOLOGY | Facility: HOSPITAL | Age: 59
DRG: 871 | End: 2024-02-05
Payer: COMMERCIAL

## 2024-02-05 ENCOUNTER — HOSPITAL ENCOUNTER (INPATIENT)
Facility: HOSPITAL | Age: 59
LOS: 4 days | Discharge: HOME HEALTH | DRG: 871 | End: 2024-02-09
Attending: EMERGENCY MEDICINE | Admitting: FAMILY MEDICINE
Payer: COMMERCIAL

## 2024-02-05 ENCOUNTER — APPOINTMENT (OUTPATIENT)
Dept: CT IMAGING | Facility: HOSPITAL | Age: 59
DRG: 871 | End: 2024-02-05
Payer: COMMERCIAL

## 2024-02-05 DIAGNOSIS — A41.9 SEPSIS, DUE TO UNSPECIFIED ORGANISM, UNSPECIFIED WHETHER ACUTE ORGAN DYSFUNCTION PRESENT: Primary | ICD-10-CM

## 2024-02-05 DIAGNOSIS — J96.02 ACUTE RESPIRATORY FAILURE WITH HYPOXIA AND HYPERCAPNIA: ICD-10-CM

## 2024-02-05 DIAGNOSIS — J96.01 ACUTE RESPIRATORY FAILURE WITH HYPOXIA AND HYPERCAPNIA: ICD-10-CM

## 2024-02-05 DIAGNOSIS — J18.9 PNEUMONIA DUE TO INFECTIOUS ORGANISM, UNSPECIFIED LATERALITY, UNSPECIFIED PART OF LUNG: ICD-10-CM

## 2024-02-05 DIAGNOSIS — J44.9 CHRONIC OBSTRUCTIVE PULMONARY DISEASE, UNSPECIFIED COPD TYPE: ICD-10-CM

## 2024-02-05 DIAGNOSIS — R26.2 DIFFICULTY WALKING: ICD-10-CM

## 2024-02-05 LAB
ALBUMIN SERPL-MCNC: 3.4 G/DL (ref 3.5–5.2)
ALBUMIN SERPL-MCNC: 4 G/DL (ref 3.5–5.2)
ALBUMIN/GLOB SERPL: 1.1 G/DL
ALBUMIN/GLOB SERPL: 1.2 G/DL
ALP SERPL-CCNC: 119 U/L (ref 39–117)
ALP SERPL-CCNC: 80 U/L (ref 39–117)
ALT SERPL W P-5'-P-CCNC: 12 U/L (ref 1–33)
ALT SERPL W P-5'-P-CCNC: 9 U/L (ref 1–33)
AMPHET+METHAMPHET UR QL: NEGATIVE
ANION GAP SERPL CALCULATED.3IONS-SCNC: 11 MMOL/L (ref 5–15)
ANION GAP SERPL CALCULATED.3IONS-SCNC: 9.8 MMOL/L (ref 5–15)
APTT PPP: 29.9 SECONDS (ref 24.2–34.2)
ARTERIAL PATENCY WRIST A: ABNORMAL
ARTERIAL PATENCY WRIST A: ABNORMAL
AST SERPL-CCNC: 15 U/L (ref 1–32)
AST SERPL-CCNC: 22 U/L (ref 1–32)
B PARAPERT DNA SPEC QL NAA+PROBE: NOT DETECTED
B PERT DNA SPEC QL NAA+PROBE: NOT DETECTED
BACTERIA UR QL AUTO: ABNORMAL /HPF
BARBITURATES UR QL SCN: NEGATIVE
BASE EXCESS BLDA CALC-SCNC: -3.2 MMOL/L (ref -2–2)
BASE EXCESS BLDA CALC-SCNC: -4.1 MMOL/L (ref -2–2)
BASOPHILS # BLD AUTO: 0.01 10*3/MM3 (ref 0–0.2)
BASOPHILS # BLD AUTO: 0.03 10*3/MM3 (ref 0–0.2)
BASOPHILS NFR BLD AUTO: 0.1 % (ref 0–1.5)
BASOPHILS NFR BLD AUTO: 0.2 % (ref 0–1.5)
BDY SITE: ABNORMAL
BDY SITE: ABNORMAL
BENZODIAZ UR QL SCN: POSITIVE
BILIRUB SERPL-MCNC: 0.4 MG/DL (ref 0–1.2)
BILIRUB SERPL-MCNC: 0.6 MG/DL (ref 0–1.2)
BILIRUB UR QL STRIP: NEGATIVE
BUN SERPL-MCNC: 10 MG/DL (ref 6–20)
BUN SERPL-MCNC: 13 MG/DL (ref 6–20)
BUN/CREAT SERPL: 17.5 (ref 7–25)
BUN/CREAT SERPL: 22 (ref 7–25)
C PNEUM DNA NPH QL NAA+NON-PROBE: NOT DETECTED
CALCIUM SPEC-SCNC: 8.4 MG/DL (ref 8.6–10.5)
CALCIUM SPEC-SCNC: 9.3 MG/DL (ref 8.6–10.5)
CANNABINOIDS SERPL QL: NEGATIVE
CHLORIDE SERPL-SCNC: 105 MMOL/L (ref 98–107)
CHLORIDE SERPL-SCNC: 98 MMOL/L (ref 98–107)
CLARITY UR: ABNORMAL
CO2 SERPL-SCNC: 23 MMOL/L (ref 22–29)
CO2 SERPL-SCNC: 30.2 MMOL/L (ref 22–29)
COCAINE UR QL: NEGATIVE
COD CRY URNS QL: ABNORMAL /HPF
COHGB MFR BLD: 2.4 % (ref 0–1.5)
COHGB MFR BLD: 4.7 % (ref 0–1.5)
COLOR UR: YELLOW
CREAT SERPL-MCNC: 0.57 MG/DL (ref 0.57–1)
CREAT SERPL-MCNC: 0.59 MG/DL (ref 0.57–1)
CRP SERPL-MCNC: 11.43 MG/DL (ref 0–0.5)
D-LACTATE SERPL-SCNC: 0.8 MMOL/L (ref 0.5–2)
D-LACTATE SERPL-SCNC: 1.1 MMOL/L (ref 0.5–2)
DEPRECATED RDW RBC AUTO: 47.4 FL (ref 37–54)
DEPRECATED RDW RBC AUTO: 50.7 FL (ref 37–54)
EGFRCR SERPLBLD CKD-EPI 2021: 104.6 ML/MIN/1.73
EGFRCR SERPLBLD CKD-EPI 2021: 105.5 ML/MIN/1.73
EOSINOPHIL # BLD AUTO: 0.03 10*3/MM3 (ref 0–0.4)
EOSINOPHIL # BLD AUTO: 0.06 10*3/MM3 (ref 0–0.4)
EOSINOPHIL NFR BLD AUTO: 0.2 % (ref 0.3–6.2)
EOSINOPHIL NFR BLD AUTO: 0.7 % (ref 0.3–6.2)
ERYTHROCYTE [DISTWIDTH] IN BLOOD BY AUTOMATED COUNT: 14.6 % (ref 12.3–15.4)
ERYTHROCYTE [DISTWIDTH] IN BLOOD BY AUTOMATED COUNT: 14.7 % (ref 12.3–15.4)
FENTANYL UR-MCNC: NEGATIVE NG/ML
FHHB: 14.7 % (ref 0–5)
FHHB: 5.5 % (ref 0–5)
FLUAV SUBTYP SPEC NAA+PROBE: NOT DETECTED
FLUAV SUBTYP SPEC NAA+PROBE: NOT DETECTED
FLUBV RNA ISLT QL NAA+PROBE: NOT DETECTED
FLUBV RNA ISLT QL NAA+PROBE: NOT DETECTED
GAS FLOW AIRWAY: 4 LPM
GEN 5 2HR TROPONIN T REFLEX: 52 NG/L
GLOBULIN UR ELPH-MCNC: 2.9 GM/DL
GLOBULIN UR ELPH-MCNC: 3.5 GM/DL
GLUCOSE SERPL-MCNC: 115 MG/DL (ref 65–99)
GLUCOSE SERPL-MCNC: 126 MG/DL (ref 65–99)
GLUCOSE UR STRIP-MCNC: NEGATIVE MG/DL
HADV DNA SPEC NAA+PROBE: NOT DETECTED
HCO3 BLDA-SCNC: 22.3 MMOL/L (ref 22–26)
HCO3 BLDA-SCNC: 23.9 MMOL/L (ref 22–26)
HCOV 229E RNA SPEC QL NAA+PROBE: NOT DETECTED
HCOV HKU1 RNA SPEC QL NAA+PROBE: NOT DETECTED
HCOV NL63 RNA SPEC QL NAA+PROBE: NOT DETECTED
HCOV OC43 RNA SPEC QL NAA+PROBE: NOT DETECTED
HCT VFR BLD AUTO: 39.7 % (ref 34–46.6)
HCT VFR BLD AUTO: 44.6 % (ref 34–46.6)
HEMOCCULT STL QL IA: POSITIVE
HGB BLD-MCNC: 12.5 G/DL (ref 12–15.9)
HGB BLD-MCNC: 13.3 G/DL (ref 12–15.9)
HGB BLDA-MCNC: 12.9 G/DL (ref 11.7–14.6)
HGB BLDA-MCNC: 14.6 G/DL (ref 11.7–14.6)
HGB UR QL STRIP.AUTO: ABNORMAL
HMPV RNA NPH QL NAA+NON-PROBE: NOT DETECTED
HOLD SPECIMEN: NORMAL
HOLD SPECIMEN: NORMAL
HPIV1 RNA ISLT QL NAA+PROBE: NOT DETECTED
HPIV2 RNA SPEC QL NAA+PROBE: NOT DETECTED
HPIV3 RNA NPH QL NAA+PROBE: NOT DETECTED
HPIV4 P GENE NPH QL NAA+PROBE: NOT DETECTED
HYALINE CASTS UR QL AUTO: ABNORMAL /LPF
IMM GRANULOCYTES # BLD AUTO: 0.04 10*3/MM3 (ref 0–0.05)
IMM GRANULOCYTES # BLD AUTO: 0.05 10*3/MM3 (ref 0–0.05)
IMM GRANULOCYTES NFR BLD AUTO: 0.4 % (ref 0–0.5)
IMM GRANULOCYTES NFR BLD AUTO: 0.5 % (ref 0–0.5)
INHALED O2 CONCENTRATION: 30 %
INHALED O2 CONCENTRATION: 36 %
INR PPP: 1.09 (ref 0.86–1.15)
KETONES UR QL STRIP: NEGATIVE
L PNEUMO1 AG UR QL IA: NEGATIVE
LACTATE BLDA-SCNC: ABNORMAL MMOL/L
LARGE PLATELETS: NORMAL
LEUKOCYTE ESTERASE UR QL STRIP.AUTO: ABNORMAL
LYMPHOCYTES # BLD AUTO: 0.24 10*3/MM3 (ref 0.7–3.1)
LYMPHOCYTES # BLD AUTO: 0.39 10*3/MM3 (ref 0.7–3.1)
LYMPHOCYTES NFR BLD AUTO: 2 % (ref 19.6–45.3)
LYMPHOCYTES NFR BLD AUTO: 4.6 % (ref 19.6–45.3)
M PNEUMO IGG SER IA-ACNC: NOT DETECTED
MAGNESIUM SERPL-MCNC: 1.6 MG/DL (ref 1.6–2.6)
MAGNESIUM SERPL-MCNC: 1.9 MG/DL (ref 1.6–2.6)
MCH RBC QN AUTO: 27.8 PG (ref 26.6–33)
MCH RBC QN AUTO: 28 PG (ref 26.6–33)
MCHC RBC AUTO-ENTMCNC: 29.8 G/DL (ref 31.5–35.7)
MCHC RBC AUTO-ENTMCNC: 31.5 G/DL (ref 31.5–35.7)
MCV RBC AUTO: 89 FL (ref 79–97)
MCV RBC AUTO: 93.3 FL (ref 79–97)
METHADONE UR QL SCN: NEGATIVE
METHGB BLD QL: 0.1 % (ref 0–1.5)
METHGB BLD QL: 0.2 % (ref 0–1.5)
MODALITY: ABNORMAL
MODALITY: ABNORMAL
MONOCYTES # BLD AUTO: 0.23 10*3/MM3 (ref 0.1–0.9)
MONOCYTES # BLD AUTO: 0.54 10*3/MM3 (ref 0.1–0.9)
MONOCYTES NFR BLD AUTO: 1.9 % (ref 5–12)
MONOCYTES NFR BLD AUTO: 6.4 % (ref 5–12)
MRSA DNA SPEC QL NAA+PROBE: NORMAL
NEUTROPHILS NFR BLD AUTO: 11.5 10*3/MM3 (ref 1.7–7)
NEUTROPHILS NFR BLD AUTO: 7.46 10*3/MM3 (ref 1.7–7)
NEUTROPHILS NFR BLD AUTO: 87.7 % (ref 42.7–76)
NEUTROPHILS NFR BLD AUTO: 95.3 % (ref 42.7–76)
NITRITE UR QL STRIP: NEGATIVE
NOTE: ABNORMAL
NRBC BLD AUTO-RTO: 0 /100 WBC (ref 0–0.2)
NRBC BLD AUTO-RTO: 0 /100 WBC (ref 0–0.2)
NT-PROBNP SERPL-MCNC: 650.2 PG/ML (ref 0–900)
OPIATES UR QL: POSITIVE
OXYCODONE UR QL SCN: NEGATIVE
OXYHGB MFR BLDV: 80.5 % (ref 94–99)
OXYHGB MFR BLDV: 91.9 % (ref 94–99)
PCO2 BLDA: 45.6 MM HG (ref 35–45)
PCO2 BLDA: 51.5 MM HG (ref 35–45)
PH BLDA: 7.29 PH UNITS (ref 7.35–7.45)
PH BLDA: 7.31 PH UNITS (ref 7.35–7.45)
PH UR STRIP.AUTO: 5.5 [PH] (ref 5–8)
PHOSPHATE SERPL-MCNC: 4.5 MG/DL (ref 2.5–4.5)
PLATELET # BLD AUTO: 120 10*3/MM3 (ref 140–450)
PLATELET # BLD AUTO: 150 10*3/MM3 (ref 140–450)
PMV BLD AUTO: 10.5 FL (ref 6–12)
PMV BLD AUTO: 10.6 FL (ref 6–12)
PO2 BLD: 156 MM[HG] (ref 0–500)
PO2 BLD: 262 MM[HG] (ref 0–500)
PO2 BLDA: 56.3 MM HG (ref 80–100)
PO2 BLDA: 78.6 MM HG (ref 80–100)
POTASSIUM SERPL-SCNC: 4 MMOL/L (ref 3.5–5.2)
POTASSIUM SERPL-SCNC: 4.5 MMOL/L (ref 3.5–5.2)
PROCALCITONIN SERPL-MCNC: 16.45 NG/ML (ref 0–0.25)
PROT SERPL-MCNC: 6.3 G/DL (ref 6–8.5)
PROT SERPL-MCNC: 7.5 G/DL (ref 6–8.5)
PROT UR QL STRIP: NEGATIVE
PROTHROMBIN TIME: 14.3 SECONDS (ref 11.8–14.9)
RBC # BLD AUTO: 4.46 10*6/MM3 (ref 3.77–5.28)
RBC # BLD AUTO: 4.78 10*6/MM3 (ref 3.77–5.28)
RBC # UR STRIP: ABNORMAL /HPF
RBC MORPH BLD: NORMAL
REF LAB TEST METHOD: ABNORMAL
RHINOVIRUS RNA SPEC NAA+PROBE: NOT DETECTED
RSV RNA NPH QL NAA+NON-PROBE: NOT DETECTED
RSV RNA NPH QL NAA+NON-PROBE: NOT DETECTED
S PNEUM AG SPEC QL LA: NEGATIVE
SAO2 % BLDCOA: 84.6 % (ref 95–99)
SAO2 % BLDCOA: 94.4 % (ref 95–99)
SARS-COV-2 RNA RESP QL NAA+PROBE: NOT DETECTED
SARS-COV-2 RNA RESP QL NAA+PROBE: NOT DETECTED
SET MECH RESP RATE: 18
SMALL PLATELETS BLD QL SMEAR: NORMAL
SODIUM SERPL-SCNC: 138 MMOL/L (ref 136–145)
SODIUM SERPL-SCNC: 139 MMOL/L (ref 136–145)
SP GR UR STRIP: 1.02 (ref 1–1.03)
SQUAMOUS #/AREA URNS HPF: ABNORMAL /HPF
TROPONIN T DELTA: 26 NG/L
TROPONIN T SERPL HS-MCNC: 26 NG/L
UROBILINOGEN UR QL STRIP: ABNORMAL
WBC # UR STRIP: ABNORMAL /HPF
WBC MORPH BLD: NORMAL
WBC NRBC COR # BLD AUTO: 12.08 10*3/MM3 (ref 3.4–10.8)
WBC NRBC COR # BLD AUTO: 8.5 10*3/MM3 (ref 3.4–10.8)
WHOLE BLOOD HOLD COAG: NORMAL
WHOLE BLOOD HOLD SPECIMEN: NORMAL

## 2024-02-05 PROCEDURE — 83050 HGB METHEMOGLOBIN QUAN: CPT | Performed by: NURSE PRACTITIONER

## 2024-02-05 PROCEDURE — 82805 BLOOD GASES W/O2 SATURATION: CPT | Performed by: EMERGENCY MEDICINE

## 2024-02-05 PROCEDURE — 84100 ASSAY OF PHOSPHORUS: CPT | Performed by: EMERGENCY MEDICINE

## 2024-02-05 PROCEDURE — 85025 COMPLETE CBC W/AUTO DIFF WBC: CPT | Performed by: FAMILY MEDICINE

## 2024-02-05 PROCEDURE — 80307 DRUG TEST PRSMV CHEM ANLYZR: CPT | Performed by: NURSE PRACTITIONER

## 2024-02-05 PROCEDURE — 99291 CRITICAL CARE FIRST HOUR: CPT | Performed by: STUDENT IN AN ORGANIZED HEALTH CARE EDUCATION/TRAINING PROGRAM

## 2024-02-05 PROCEDURE — 36600 WITHDRAWAL OF ARTERIAL BLOOD: CPT | Performed by: EMERGENCY MEDICINE

## 2024-02-05 PROCEDURE — 80053 COMPREHEN METABOLIC PANEL: CPT | Performed by: EMERGENCY MEDICINE

## 2024-02-05 PROCEDURE — 83735 ASSAY OF MAGNESIUM: CPT | Performed by: FAMILY MEDICINE

## 2024-02-05 PROCEDURE — 93005 ELECTROCARDIOGRAM TRACING: CPT | Performed by: EMERGENCY MEDICINE

## 2024-02-05 PROCEDURE — 85007 BL SMEAR W/DIFF WBC COUNT: CPT | Performed by: FAMILY MEDICINE

## 2024-02-05 PROCEDURE — 87070 CULTURE OTHR SPECIMN AEROBIC: CPT | Performed by: NURSE PRACTITIONER

## 2024-02-05 PROCEDURE — 80053 COMPREHEN METABOLIC PANEL: CPT | Performed by: FAMILY MEDICINE

## 2024-02-05 PROCEDURE — P9612 CATHETERIZE FOR URINE SPEC: HCPCS

## 2024-02-05 PROCEDURE — 84484 ASSAY OF TROPONIN QUANT: CPT | Performed by: EMERGENCY MEDICINE

## 2024-02-05 PROCEDURE — 0202U NFCT DS 22 TRGT SARS-COV-2: CPT | Performed by: STUDENT IN AN ORGANIZED HEALTH CARE EDUCATION/TRAINING PROGRAM

## 2024-02-05 PROCEDURE — 82274 ASSAY TEST FOR BLOOD FECAL: CPT | Performed by: EMERGENCY MEDICINE

## 2024-02-05 PROCEDURE — 94640 AIRWAY INHALATION TREATMENT: CPT

## 2024-02-05 PROCEDURE — 99291 CRITICAL CARE FIRST HOUR: CPT

## 2024-02-05 PROCEDURE — 25810000003 SODIUM CHLORIDE 0.9 % SOLUTION: Performed by: EMERGENCY MEDICINE

## 2024-02-05 PROCEDURE — 25810000003 SODIUM CHLORIDE 0.9 % SOLUTION

## 2024-02-05 PROCEDURE — 94799 UNLISTED PULMONARY SVC/PX: CPT

## 2024-02-05 PROCEDURE — 85610 PROTHROMBIN TIME: CPT | Performed by: EMERGENCY MEDICINE

## 2024-02-05 PROCEDURE — 84145 PROCALCITONIN (PCT): CPT | Performed by: FAMILY MEDICINE

## 2024-02-05 PROCEDURE — 85025 COMPLETE CBC W/AUTO DIFF WBC: CPT | Performed by: EMERGENCY MEDICINE

## 2024-02-05 PROCEDURE — 36600 WITHDRAWAL OF ARTERIAL BLOOD: CPT | Performed by: NURSE PRACTITIONER

## 2024-02-05 PROCEDURE — 83050 HGB METHEMOGLOBIN QUAN: CPT | Performed by: EMERGENCY MEDICINE

## 2024-02-05 PROCEDURE — 85730 THROMBOPLASTIN TIME PARTIAL: CPT | Performed by: EMERGENCY MEDICINE

## 2024-02-05 PROCEDURE — 83880 ASSAY OF NATRIURETIC PEPTIDE: CPT | Performed by: EMERGENCY MEDICINE

## 2024-02-05 PROCEDURE — 82375 ASSAY CARBOXYHB QUANT: CPT | Performed by: NURSE PRACTITIONER

## 2024-02-05 PROCEDURE — 87205 SMEAR GRAM STAIN: CPT | Performed by: NURSE PRACTITIONER

## 2024-02-05 PROCEDURE — 74176 CT ABD & PELVIS W/O CONTRAST: CPT

## 2024-02-05 PROCEDURE — 93010 ELECTROCARDIOGRAM REPORT: CPT | Performed by: INTERNAL MEDICINE

## 2024-02-05 PROCEDURE — 83605 ASSAY OF LACTIC ACID: CPT | Performed by: EMERGENCY MEDICINE

## 2024-02-05 PROCEDURE — 94660 CPAP INITIATION&MGMT: CPT

## 2024-02-05 PROCEDURE — 87641 MR-STAPH DNA AMP PROBE: CPT | Performed by: NURSE PRACTITIONER

## 2024-02-05 PROCEDURE — 25010000002 HEPARIN (PORCINE) PER 1000 UNITS: Performed by: NURSE PRACTITIONER

## 2024-02-05 PROCEDURE — 83605 ASSAY OF LACTIC ACID: CPT | Performed by: FAMILY MEDICINE

## 2024-02-05 PROCEDURE — 82805 BLOOD GASES W/O2 SATURATION: CPT | Performed by: NURSE PRACTITIONER

## 2024-02-05 PROCEDURE — 82375 ASSAY CARBOXYHB QUANT: CPT | Performed by: EMERGENCY MEDICINE

## 2024-02-05 PROCEDURE — 83735 ASSAY OF MAGNESIUM: CPT | Performed by: EMERGENCY MEDICINE

## 2024-02-05 PROCEDURE — 87154 CUL TYP ID BLD PTHGN 6+ TRGT: CPT | Performed by: EMERGENCY MEDICINE

## 2024-02-05 PROCEDURE — 86140 C-REACTIVE PROTEIN: CPT | Performed by: EMERGENCY MEDICINE

## 2024-02-05 PROCEDURE — 87040 BLOOD CULTURE FOR BACTERIA: CPT | Performed by: EMERGENCY MEDICINE

## 2024-02-05 PROCEDURE — 87186 SC STD MICRODIL/AGAR DIL: CPT | Performed by: EMERGENCY MEDICINE

## 2024-02-05 PROCEDURE — 87637 SARSCOV2&INF A&B&RSV AMP PRB: CPT | Performed by: EMERGENCY MEDICINE

## 2024-02-05 PROCEDURE — 81001 URINALYSIS AUTO W/SCOPE: CPT | Performed by: EMERGENCY MEDICINE

## 2024-02-05 PROCEDURE — 99223 1ST HOSP IP/OBS HIGH 75: CPT | Performed by: FAMILY MEDICINE

## 2024-02-05 PROCEDURE — 71045 X-RAY EXAM CHEST 1 VIEW: CPT

## 2024-02-05 PROCEDURE — 25010000002 ONDANSETRON PER 1 MG: Performed by: FAMILY MEDICINE

## 2024-02-05 PROCEDURE — 25010000002 LEVOFLOXACIN PER 250 MG: Performed by: EMERGENCY MEDICINE

## 2024-02-05 PROCEDURE — 25010000002 MAGNESIUM SULFATE 2 GM/50ML SOLUTION: Performed by: INTERNAL MEDICINE

## 2024-02-05 PROCEDURE — 25810000003 SODIUM CHLORIDE 0.9 % SOLUTION: Performed by: FAMILY MEDICINE

## 2024-02-05 PROCEDURE — 99291 CRITICAL CARE FIRST HOUR: CPT | Performed by: INTERNAL MEDICINE

## 2024-02-05 PROCEDURE — 87449 NOS EACH ORGANISM AG IA: CPT | Performed by: NURSE PRACTITIONER

## 2024-02-05 PROCEDURE — 87077 CULTURE AEROBIC IDENTIFY: CPT | Performed by: EMERGENCY MEDICINE

## 2024-02-05 PROCEDURE — 36415 COLL VENOUS BLD VENIPUNCTURE: CPT | Performed by: FAMILY MEDICINE

## 2024-02-05 RX ORDER — POLYETHYLENE GLYCOL 3350 17 G/17G
17 POWDER, FOR SOLUTION ORAL DAILY PRN
Status: DISCONTINUED | OUTPATIENT
Start: 2024-02-05 | End: 2024-02-09 | Stop reason: HOSPADM

## 2024-02-05 RX ORDER — IPRATROPIUM BROMIDE AND ALBUTEROL SULFATE 2.5; .5 MG/3ML; MG/3ML
3 SOLUTION RESPIRATORY (INHALATION)
Status: DISCONTINUED | OUTPATIENT
Start: 2024-02-05 | End: 2024-02-06

## 2024-02-05 RX ORDER — ACETAMINOPHEN 650 MG/1
650 SUPPOSITORY RECTAL ONCE
Status: COMPLETED | OUTPATIENT
Start: 2024-02-05 | End: 2024-02-05

## 2024-02-05 RX ORDER — CLOPIDOGREL BISULFATE 75 MG/1
75 TABLET ORAL DAILY
Status: DISCONTINUED | OUTPATIENT
Start: 2024-02-05 | End: 2024-02-09 | Stop reason: HOSPADM

## 2024-02-05 RX ORDER — NOREPINEPHRINE BITARTRATE 0.03 MG/ML
.02-.3 INJECTION, SOLUTION INTRAVENOUS
Status: DISCONTINUED | OUTPATIENT
Start: 2024-02-05 | End: 2024-02-06

## 2024-02-05 RX ORDER — SODIUM CHLORIDE 0.9 % (FLUSH) 0.9 %
10 SYRINGE (ML) INJECTION AS NEEDED
Status: DISCONTINUED | OUTPATIENT
Start: 2024-02-05 | End: 2024-02-07

## 2024-02-05 RX ORDER — ONDANSETRON 2 MG/ML
4 INJECTION INTRAMUSCULAR; INTRAVENOUS EVERY 6 HOURS PRN
Status: DISCONTINUED | OUTPATIENT
Start: 2024-02-05 | End: 2024-02-09 | Stop reason: HOSPADM

## 2024-02-05 RX ORDER — HYDROCODONE BITARTRATE AND ACETAMINOPHEN 5; 325 MG/1; MG/1
1 TABLET ORAL DAILY PRN
Status: ON HOLD | COMMUNITY

## 2024-02-05 RX ORDER — SODIUM CHLORIDE 9 MG/ML
75 INJECTION, SOLUTION INTRAVENOUS CONTINUOUS
Status: DISCONTINUED | OUTPATIENT
Start: 2024-02-05 | End: 2024-02-07

## 2024-02-05 RX ORDER — SODIUM CHLORIDE 0.9 % (FLUSH) 0.9 %
10 SYRINGE (ML) INJECTION EVERY 12 HOURS SCHEDULED
Status: DISCONTINUED | OUTPATIENT
Start: 2024-02-05 | End: 2024-02-09 | Stop reason: HOSPADM

## 2024-02-05 RX ORDER — FUROSEMIDE 40 MG/1
40 TABLET ORAL DAILY
Status: ON HOLD | COMMUNITY

## 2024-02-05 RX ORDER — SODIUM CHLORIDE 9 MG/ML
40 INJECTION, SOLUTION INTRAVENOUS AS NEEDED
Status: DISCONTINUED | OUTPATIENT
Start: 2024-02-05 | End: 2024-02-09 | Stop reason: HOSPADM

## 2024-02-05 RX ORDER — BISACODYL 10 MG
10 SUPPOSITORY, RECTAL RECTAL DAILY PRN
Status: DISCONTINUED | OUTPATIENT
Start: 2024-02-05 | End: 2024-02-09 | Stop reason: HOSPADM

## 2024-02-05 RX ORDER — GABAPENTIN 300 MG/1
300 CAPSULE ORAL 4 TIMES DAILY
Status: ON HOLD | COMMUNITY
End: 2024-02-19

## 2024-02-05 RX ORDER — SODIUM CHLORIDE 0.9 % (FLUSH) 0.9 %
10 SYRINGE (ML) INJECTION AS NEEDED
Status: DISCONTINUED | OUTPATIENT
Start: 2024-02-05 | End: 2024-02-09 | Stop reason: HOSPADM

## 2024-02-05 RX ORDER — HEPARIN SODIUM 5000 [USP'U]/ML
5000 INJECTION, SOLUTION INTRAVENOUS; SUBCUTANEOUS EVERY 8 HOURS SCHEDULED
Status: DISCONTINUED | OUTPATIENT
Start: 2024-02-05 | End: 2024-02-09 | Stop reason: HOSPADM

## 2024-02-05 RX ORDER — BUDESONIDE 0.5 MG/2ML
0.5 INHALANT ORAL
Status: DISCONTINUED | OUTPATIENT
Start: 2024-02-05 | End: 2024-02-09 | Stop reason: HOSPADM

## 2024-02-05 RX ORDER — ACETAMINOPHEN 325 MG/1
975 TABLET ORAL ONCE
Status: COMPLETED | OUTPATIENT
Start: 2024-02-05 | End: 2024-02-07

## 2024-02-05 RX ORDER — AMOXICILLIN 250 MG
2 CAPSULE ORAL 2 TIMES DAILY
Status: DISCONTINUED | OUTPATIENT
Start: 2024-02-05 | End: 2024-02-09 | Stop reason: HOSPADM

## 2024-02-05 RX ORDER — LEVOFLOXACIN 5 MG/ML
750 INJECTION, SOLUTION INTRAVENOUS ONCE
Status: COMPLETED | OUTPATIENT
Start: 2024-02-05 | End: 2024-02-05

## 2024-02-05 RX ORDER — NITROGLYCERIN 0.4 MG/1
0.4 TABLET SUBLINGUAL
Status: DISCONTINUED | OUTPATIENT
Start: 2024-02-05 | End: 2024-02-09 | Stop reason: HOSPADM

## 2024-02-05 RX ORDER — LEVOFLOXACIN 750 MG/1
750 TABLET, FILM COATED ORAL EVERY 24 HOURS
Status: DISCONTINUED | OUTPATIENT
Start: 2024-02-06 | End: 2024-02-06

## 2024-02-05 RX ORDER — SODIUM CHLORIDE 9 MG/ML
INJECTION, SOLUTION INTRAVENOUS
Status: COMPLETED
Start: 2024-02-05 | End: 2024-02-05

## 2024-02-05 RX ORDER — MAGNESIUM SULFATE HEPTAHYDRATE 40 MG/ML
2 INJECTION, SOLUTION INTRAVENOUS ONCE
Status: COMPLETED | OUTPATIENT
Start: 2024-02-05 | End: 2024-02-05

## 2024-02-05 RX ORDER — HYDROXYCHLOROQUINE SULFATE 200 MG/1
200 TABLET, FILM COATED ORAL DAILY
Status: ON HOLD | COMMUNITY

## 2024-02-05 RX ORDER — NOREPINEPHRINE BITARTRATE 0.03 MG/ML
.02-.3 INJECTION, SOLUTION INTRAVENOUS
Status: DISCONTINUED | OUTPATIENT
Start: 2024-02-05 | End: 2024-02-05 | Stop reason: SDUPTHER

## 2024-02-05 RX ORDER — BISACODYL 5 MG/1
5 TABLET, DELAYED RELEASE ORAL DAILY PRN
Status: DISCONTINUED | OUTPATIENT
Start: 2024-02-05 | End: 2024-02-09 | Stop reason: HOSPADM

## 2024-02-05 RX ORDER — ARFORMOTEROL TARTRATE 15 UG/2ML
15 SOLUTION RESPIRATORY (INHALATION)
Status: DISCONTINUED | OUTPATIENT
Start: 2024-02-05 | End: 2024-02-09 | Stop reason: HOSPADM

## 2024-02-05 RX ADMIN — SODIUM CHLORIDE 500 ML: 9 INJECTION, SOLUTION INTRAVENOUS at 08:02

## 2024-02-05 RX ADMIN — HEPARIN SODIUM 5000 UNITS: 5000 INJECTION INTRAVENOUS; SUBCUTANEOUS at 13:51

## 2024-02-05 RX ADMIN — NOREPINEPHRINE BITARTRATE 0.1 MCG/KG/MIN: 0.03 INJECTION, SOLUTION INTRAVENOUS at 08:03

## 2024-02-05 RX ADMIN — IPRATROPIUM BROMIDE AND ALBUTEROL SULFATE 3 ML: .5; 3 SOLUTION RESPIRATORY (INHALATION) at 10:00

## 2024-02-05 RX ADMIN — MAGNESIUM SULFATE HEPTAHYDRATE 2 G: 2 INJECTION, SOLUTION INTRAVENOUS at 09:16

## 2024-02-05 RX ADMIN — HEPARIN SODIUM 5000 UNITS: 5000 INJECTION INTRAVENOUS; SUBCUTANEOUS at 23:08

## 2024-02-05 RX ADMIN — ONDANSETRON 4 MG: 2 INJECTION INTRAMUSCULAR; INTRAVENOUS at 23:50

## 2024-02-05 RX ADMIN — SODIUM CHLORIDE 1000 ML: 9 INJECTION, SOLUTION INTRAVENOUS at 01:42

## 2024-02-05 RX ADMIN — IPRATROPIUM BROMIDE AND ALBUTEROL SULFATE 3 ML: .5; 3 SOLUTION RESPIRATORY (INHALATION) at 18:44

## 2024-02-05 RX ADMIN — Medication 10 ML: at 13:52

## 2024-02-05 RX ADMIN — Medication 10 ML: at 21:03

## 2024-02-05 RX ADMIN — LEVOFLOXACIN 750 MG: 750 INJECTION, SOLUTION INTRAVENOUS at 02:13

## 2024-02-05 RX ADMIN — ACETAMINOPHEN 650 MG: 650 SUPPOSITORY RECTAL at 02:11

## 2024-02-05 RX ADMIN — ARFORMOTEROL TARTRATE 15 MCG: 15 SOLUTION RESPIRATORY (INHALATION) at 10:00

## 2024-02-05 RX ADMIN — IPRATROPIUM BROMIDE AND ALBUTEROL SULFATE 3 ML: .5; 3 SOLUTION RESPIRATORY (INHALATION) at 14:04

## 2024-02-05 RX ADMIN — ARFORMOTEROL TARTRATE 15 MCG: 15 SOLUTION RESPIRATORY (INHALATION) at 18:43

## 2024-02-05 RX ADMIN — BUDESONIDE 0.5 MG: 0.5 SUSPENSION RESPIRATORY (INHALATION) at 10:00

## 2024-02-05 RX ADMIN — SODIUM CHLORIDE 1000 ML: 9 INJECTION, SOLUTION INTRAVENOUS at 01:47

## 2024-02-05 RX ADMIN — BUDESONIDE 0.5 MG: 0.5 SUSPENSION RESPIRATORY (INHALATION) at 18:44

## 2024-02-05 RX ADMIN — NOREPINEPHRINE BITARTRATE 0.02 MCG/KG/MIN: 0.03 INJECTION, SOLUTION INTRAVENOUS at 05:28

## 2024-02-05 RX ADMIN — SODIUM CHLORIDE 125 ML/HR: 9 INJECTION, SOLUTION INTRAVENOUS at 20:07

## 2024-02-05 NOTE — ED NOTES
Spoke with patient's daughter, Sheila - updated on patients plan of care.     New phone number [phone].

## 2024-02-05 NOTE — SIGNIFICANT NOTE
Wound Eval / Progress Noted    JEMMA Go     Patient Name: Isha Llanes  : 1965  MRN: [id number]  Today's Date: 2024                 Admit Date: 2024    Visit Dx:    ICD-10-CM ICD-9-CM   1. Sepsis, due to unspecified organism, unspecified whether acute organ dysfunction present  A41.9 038.9     995.91   2. Pneumonia due to infectious organism, unspecified laterality, unspecified part of lung  J18.9 486   3. Acute respiratory failure with hypoxia and hypercapnia  J96.01 518.81    J96.02          Sepsis        Past Medical History:   Diagnosis Date    Anesthesia     REPORTS HAS GOT REAL EMOTIONAL AND HAS BECOME ANGRY BEFORE    Anxiety     Aortic stenosis, severe     HAS BIO VALVE REPLACED    Arthritis     Asthma     Back pain     Blood clotting disorder     Cancer     Cancer associated pain     HODGKINS LYMPHOMA    CHF (congestive heart failure)     Chronic low back pain with sciatica     Chronic pain disorder     COPD (chronic obstructive pulmonary disease)     Coronary artery disease     Diabetes mellitus     TYPE 2    Diabetes mellitus     Dyspnea on effort     GERD (gastroesophageal reflux disease)     H/O lumpectomy     H/O: hysterectomy     Hiatal hernia     History of degenerative disc disease     History of kidney stones     History of pulmonary embolus (PE)     History of transfusion     AS A CHILD NO TRANSFUSION REACTION    History of transfusion     Hodgkin's lymphoma     23  5 YEARS SINCE LAST CHEMO TX    Hypertension     Immobility     Liver disease     DENIES ANY CURRENT ISSUES    Lupus     Mitral valve prolapse     Panic disorder     Pelvic pain     Peripheral neuropathy     Personal history of DVT (deep vein thrombosis)     Presence of intrathecal pump     PTSD (post-traumatic stress disorder)     Sacroiliac joint disease     SI (sacroiliac) joint inflammation     Sleep apnea     Venous insufficiency         Past Surgical History:   Procedure Laterality Date    ABDOMINAL  SURGERY      BACK SURGERY      SPINAL FUSION     BACK SURGERY      BLADDER REPAIR      CARDIAC CATHETERIZATION N/A 03/06/2019    Procedure: Valvuloplasty;  Surgeon: Wayne Johnson MD;  Location: [facility] CATH INVASIVE LOCATION;  Service: Cardiology    CARDIAC CATHETERIZATION      CARDIAC CATHETERIZATION Left 5/31/2023    Procedure: Cardiac Catheterization/Vascular Study;  Surgeon: Poncho Reid MD;  Location: [facility] CATH INVASIVE LOCATION;  Service: Cardiovascular;  Laterality: Left;    CARDIAC SURGERY      CARDIAC VALVE REPLACEMENT  2021    CHOLECYSTECTOMY      COLONOSCOPY N/A 12/29/2021    Procedure: COLONOSCOPY;  Surgeon: Karine Orlando MD;  Location: [facility] ENDOSCOPY;  Service: Gastroenterology;  Laterality: N/A;  POOR PREP    COLONOSCOPY N/A 04/13/2022    Procedure: COLONOSCOPY WITH POLYPECTOMY;  Surgeon: Karine Orlando MD;  Location: [facility] ENDOSCOPY;  Service: Gastroenterology;  Laterality: N/A;  COLON POLYP, HEMORRHOIDS, POOR PREP    COLONOSCOPY      DIRECT LARYNGOSCOPY, ESOPHAGOSCOPY, BRONCHOSCOPY N/A 5/22/2023    Procedure: DIRECT LARYNGOSCOPY WITH BIOPSIES , ESOPHAGOSCOPY, BRONCHOSCOPY;  Surgeon: Ronnie Mcgarry MD;  Location: [facility] OR OSC;  Service: ENT;  Laterality: N/A;    ENDOSCOPY N/A 12/29/2021    Procedure: ESOPHAGOGASTRODUODENOSCOPY;  Surgeon: Karine Orlando MD;  Location: [facility] ENDOSCOPY;  Service: Gastroenterology;  Laterality: N/A;  ESOPHAGITIS, GASTRITIS, HIATAL HERNIA    ENDOSCOPY      HYSTERECTOMY      LYMPHADENECTOMY      PAIN PUMP INSERTION/REVISION N/A 10/18/2019    Procedure: PAIN PUMP INSERTION [facility] 10-18-19 [place];  Surgeon: Horace Rios MD;  Location: [facility] MAIN OR;  Service: Pain Management    PAIN PUMP INSERTION/REVISION N/A 11/23/2020    Procedure: pain pump removal;  Surgeon: Horace Rios MD;  Location: [facility] MAIN OR;  Service: Pain Management;  Laterality: N/A;    PEG TUBE INSERTION N/A 7/26/2023    Procedure:  PERCUTANEOUS ENDOSCOPIC GASTROSTOMY TUBE INSERTION;  Surgeon: Kody Mercer MD;  Location: [facility] ENDOSCOPY;  Service: General;  Laterality: N/A;  SUCCESSFUL PEG TUBE PLACE PLACEMENT    PORTACATH PLACEMENT      IN LEFT CHEST REPORTS THAT IT IS NOT FUNCTIONING    SKIN BIOPSY      TONSILLECTOMY      TUBAL ABDOMINAL LIGATION      TUMOR REMOVAL           Physical Assessment:  Wound 04/05/22 1158 Left medial leg (Active)   Wound Image   02/05/24 1134   Dressing Appearance open to air 02/05/24 1134   Closure None 02/05/24 1134   Base dry;red 02/05/24 1134   Periwound dry;intact 02/05/24 1134   Periwound Temperature warm 02/05/24 1134   Periwound Skin Turgor soft 02/05/24 1134   Edges open 02/05/24 1134   Drainage Amount none 02/05/24 1134   Care, Wound cleansed with;sterile normal saline 02/05/24 1134   Dressing Care dressing applied;dressing moistened;hydrofiber;silver impregnated;gauze, dry 02/05/24 1134   Periwound Care absorptive dressing applied 02/05/24 1134       Wound 05/26/23 0140 Left calf Diabetic Ulcer (Active)   Wound Image   02/05/24 1134   Dressing Appearance open to air 02/05/24 1134   Closure None 02/05/24 1134   Base dry;red 02/05/24 1134   Periwound dry;intact;pink 02/05/24 1134   Periwound Temperature warm 02/05/24 1134   Periwound Skin Turgor soft 02/05/24 1134   Edges open 02/05/24 1134   Drainage Amount none 02/05/24 1134   Care, Wound cleansed with;sterile normal saline 02/05/24 1134   Dressing Care dressing applied;dressing moistened;hydrofiber;silver impregnated;gauze, dry 02/05/24 1134   Periwound Care absorptive dressing applied 02/05/24 1134       Wound 05/26/23 0140 Right calf (Active)   Wound Image   02/05/24 1134   Dressing Appearance open to air 02/05/24 1134   Closure None 02/05/24 1134   Base dry;red 02/05/24 1134   Periwound dry;intact;pink 02/05/24 1134   Periwound Temperature warm 02/05/24 1134   Periwound Skin Turgor soft 02/05/24 1134   Edges open 02/05/24 1134   Drainage Amount  none 02/05/24 1134   Care, Wound cleansed with;sterile normal saline 02/05/24 1134   Dressing Care dressing applied;dressing moistened;hydrofiber;silver impregnated;gauze, dry 02/05/24 1134   Periwound Care absorptive dressing applied 02/05/24 1134      Wound Check / Follow-up: Patient seen today for wound consult. Patient is currently in CICU on a continuous BiPAP. Patient's daughter is present at bedside. She reports patient is being treated for BLE wounds by home health services.    Hemosiderin staining present to BLE with pink scarring noted as well. There are two small open areas to left lower leg and one to right lower leg. All wound bases are dry and red. Periwound tissue is intact. Cleansed with normal saline and gauze. Recommending daily dressing changes with normal saline moistened silver impregnated hydrofiber, dry gauze, and gauze roll.     Blanchable redness present to bilateral gluteal aspects. All tissue is intact at this time. Recommending skin protection with application of barrier cream.    Heels intact without discoloration.     Recommending to implement every two hour turns, offload heels, keep patient free from moisture.      Impression: Chronic wounds to BLE. Blanchable redness to bilateral gluteal aspects.      Short term goals: Regain skin integrity, skin protection, moisture prevention, pressure reduction, daily dressing changes, skin care.      Cynthia Solis RN    2/5/2024    12:18 EST

## 2024-02-05 NOTE — SIGNIFICANT NOTE
Orders noted.  Patient currently still on Bipap with altered mental status.    History of dysphagia with MBS completed in Aug 2023 with recommendation to continue tube feeds as primary source of nutrition/hydration and allow pleasure feeds of puree consistencies only.    Given current medical status, recommend repeat MBS prior to resuming po intake.     Will follow up in am to assess patient readiness to complete MBS.

## 2024-02-05 NOTE — PROGRESS NOTES
[facility]   Hospitalist Progress Note  Date: 2024  Patient Name: Isha Llanes  : 1965  MRN: [id number]  Date of admission: 2024  Consultants:   -Pulmonology: Dr. Cherelle Mitchell    Subjective   Subjective     Chief Complaint: Altered mental status    Summary:   Isha Llanes is a 58 y.o. 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  Patient was found to be febrile and hypotensive and hypoxic.  Pro-Ge was noted to be elevated on labs.  CT abdomen pelvis showed possible bibasilar pneumonia.  Hospitalist service contacted for further evaluation and management.  Patient started on antibiotic treatment, Levophed and admitted to ICU level of care.  Pulmonology consulted.  Patient placed on BiPAP.    Interval Followup:   Patient remains critically ill.  Continues on Levophed.  Patient's mentation has improved some since starting on BiPAP.  Patient still having swallowing difficulties.  Nursing with no additional acute issues to report.    Antibiotics:   -Levofloxacin    Objective   Objective     Vitals:   Temp:  [97.8 °F (36.6 °C)-102.4 °F (39.1 °C)] 97.9 °F (36.6 °C)  Heart Rate:  [] 88  Resp:  [16-26] 20  BP: ()/(43-80) 99/64  Flow (L/min):  [5] 5  Physical Exam   Gen: Critically ill-appearing chronically ill female, sitting up in bed, alert but somnolent  Resp: Rhonchi noted bilaterally, normal respiratory effort, equal chest rise bilaterally  Card: RRR, No m/r/g  Abd: Soft, Nontender, Nondistended, + bowel sounds    Result Review    Result Review:  I have personally reviewed the results as below and agree with these findings:  []  Laboratory:   CMP          2023    10:43 2023    14:01 2024     01:36 2/5/2024    07:58   CMP   Glucose 135  98  126  115    BUN 10  12  10  13    Creatinine 0.45  0.46  0.57  0.59    EGFR 112.4  111.8  105.5  104.6    Sodium 140  139  138  139    Potassium 4.0  4.2  4.5  4.0    Chloride 102  98  98  105    Calcium 9.2  9.6  9.3  8.4    Total Protein 7.2  7.7  7.5  6.3    Albumin 3.9  4.9  4.0  3.4    Globulin 3.3  2.8  3.5  2.9    Total Bilirubin 0.2  <0.2  0.4  0.6    Alkaline Phosphatase 68  86  80  119    AST (SGOT) 12  11  15  22    ALT (SGPT) 7  7  9  12    Albumin/Globulin Ratio 1.2  1.8  1.1  1.2    BUN/Creatinine Ratio 22.2  26.1  17.5  22.0    Anion Gap 8.5  12.1  9.8  11.0      CBC          8/1/2023    10:43 11/29/2023    14:01 2/5/2024    01:13 2/5/2024    07:58   CBC   WBC 3.65  3.89  8.50  12.08    RBC 4.80  5.06  4.46  4.78    Hemoglobin 12.6  14.0  12.5  13.3    Hematocrit 39.9  44.7  39.7  44.6    MCV 83.1  88.3  89.0  93.3    MCH 26.3  27.7  28.0  27.8    MCHC 31.6  31.3  31.5  29.8    RDW 14.5  15.9  14.6  14.7    Platelets 158  166  150  120    Magnesium level.  Pro-Ge: 16.45.  [x]  Microbiology: Blood culture (02/05/2024): Pending.  [x]  Radiology:   [x]  EKG/Telemetry:    []  Cardiology/Vascular:    []  Pathology:  []  Old records:  []  Other:    Assessment & Plan   Assessment / Plan     Assessment:  Community-acquired pneumonia due to unknown organism, present on admission  Septic shock secondary to above, present admission  Acute hypoxic and hypercarbic respiratory failure  Acute COPD exacerbation  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  History of non-Hodgkin's lymphoma  Type 2 diabetes  History of severe aortic stenosis s/p TAVR    Plan:  -Pulmonology consulted and following, appreciate assistance and recommendations in the care of this patient.  -Continue supplemental O2 to maintain sats greater than 90%, wean as tolerated  -BiPAP available with naps and as needed  -Continue Levophed to maintain MAP greater than 65,  wean as tolerated  -Continue Brovana, Pulmicort, DuoNebs, bronchopulmonary hygiene protocol and bronchodilator protocols  -Continue Levaquin, tailor based on results of infectious workup.  Patient with significant allergies which limit what antibiotics can be used  -Patient not safe for p.o. diet.  She is to remain n.p.o. at this time.  Speech therapy following  -Plan for modified barium swallow study to be done tomorrow (02/06/2024), follow-up results and subsequent recommendations  -Wound care consulted  -Continue IV fluids  -Will monitor electrolytes and renal function with BMP and magnesium level in the AM  -Will monitor WBC and Hgb with CBC in the AM  -Clinical course will dictate further management     DVT Prophylaxis: Heparin  Diet: NPO  Dispo: Remain in ICU  Code Status: Full code     Pt is critically ill due to septic shock secondary to community-acquired pneumonia due to unknown organism, acute hypoxic and hypercarbic respiratory failure, acute COPD exacerbation, type 2 diabetes mellitus.  Critical care time spent in direct patient care: 38 minutes.  This included high complexity decision making to assess, manipulate and support vital organ system failure in this individual who has impairment of one or more vital organ systems such that there is a high probability of imminent or life threatening deterioration in the patient's condition.  Patient's management was discussed with the patient, her nurse at bedside and the following consultants: Pulmonology.     Part of this note may be an electronic transcription/translation of spoken language to printed text using the Dragon dictation system.    DVT prophylaxis:  Medical and mechanical DVT prophylaxis orders are present.        CODE STATUS:   Code Status (Patient has no pulse and is not breathing): CPR (Attempt to Resuscitate)  Medical Interventions (Patient has pulse or is breathing): Full Support        Electronically signed by Virgil Navarro MD, 02/05/24,  2:11 PM EST.

## 2024-02-05 NOTE — PAYOR COMM NOTE
"UR Dept.    [phone]  F [phone]     [facility]  [address] [address]   [phone]  NPI-[id number]  Tax ID- [id number]    Admission Date- 02/05/2024    Type of Admission:ER    Bed Type Critical    ICD-10 Code A41.9    MD Info: Virgil Navarro MD NPI: [id number]     Isha Llanes (58 y.o. Female)       Date of Birth   1965    Social Security Number       Address   [address] [place] [facility] [address]    Home Phone   [phone]    MRN   [id number]       Zoroastrianism   None    Marital Status   Legally                             Admission Date   2/5/24    Admission Type   Emergency    Admitting Provider   Jovany Bennett DO    Attending Provider   Virgil Navarro MD    Department, Room/Bed   [facility] CORONARY CARE UNIT, C01/1       Discharge Date       Discharge Disposition       Discharge Destination                                 Attending Provider: Virgil Navarro MD    Allergies: Amoxicillin, Ceclor [Cefaclor], Penicillins, Sulfa Antibiotics, Valium [Diazepam], Ambien [Zolpidem], Aspirin, Ativan [Lorazepam], Benadryl [Diphenhydramine], Biaxin [Clarithromycin], Cefazolin, Cephalexin, Cephalosporins, Clindamycin, Compazine [Prochlorperazine Edisylate], Contrast Dye (Echo Or Unknown Ct/mr), Doxycycline, Eggs Or Egg-derived Products, Nsaids, Phenergan [Promethazine Hcl], Promethazine, Vancomycin    Isolation: Contact Air   Infection: COVID (rule out) (02/05/24)   Code Status: CPR    Ht: 162.6 cm (64\")   Wt: 60.4 kg (133 lb 2.5 oz)    Admission Cmt: None   Principal Problem: None                  Active Insurance as of 2/5/2024       Primary Coverage       Payor Plan Insurance Group Employer/Plan Group    [facility] [id number]       Payor Plan Address Payor Plan Phone Number Payor Plan Fax Number Effective Dates    [address] [address]   1/1/2021 - None Entered    [facility] [phone]    "      Subscriber Name Subscriber Birth Date Member ID       RENÉE ABARCA 1965 [id number]                     Emergency Contacts        (Rel.) Home Phone Work Phone Mobile Phone    DALIA DILLON (Daughter) [phone] -- [phone]           Sepsis and Other Febrile Illness, without Focal Infection [facility] Inpatient Care       Indications Met   Last updated by Romina Wagner on 2/5/2024 0956     Review Status Created By   Primary Completed Romina Wagner      Criteria Review   Sepsis and Other Febrile Illness, without Focal Infection [facility] Inpatient Care     Overall Determination: Indications Met     Criteria:  [×] Admission is indicated for  1 or more  of the following  [B] [D]:      [×] Hemodynamic instability          2/5/2024  9:56 AM              -- 2/5/2024  9:56 AM by Romina Wagner --                                    (X) Hemodynamic instability, as indicated by  1 or more  of the following  (1) (2) (3) (4) (5) (6):                  (X) Vital sign abnormality not readily corrected by appropriate treatment, as indicated by  1 or more  of the following  [A]:                  (X) Tachycardia that persists despite appropriate treatment (eg, volume repletion, treatment of pain, treatment of underlying cause)                  (X) Hypotension that persists despite appropriate treatment (eg, volume repletion, treatment of underlying cause)                  (X) Hypotension that is severe, as indicated by  ALL  of the following :                  (X) Hypotension                  (X) Severe hypotension, as indicated by  1 or more  of the following :                  (X) Metabolic acidosis (arterial or venous [C] pH less than 7.35) not otherwise explained          2/5/2024  9:56 AM              -- 2/5/2024  9:56 AM by Romina Wagner --                  HR , Temp. 102.4, Resp. 16-26, BP 71/47, SpO2 76%, Calcium 8.4      [×] Hypoxemia          2/5/2024  9:56 AM              -- 2/5/2024   9:56 AM by Romina Wagner --                                    (X) Hypoxemia, as indicated by  1 or more  of the following  (1):                  (X) Patient without baseline need for supplemental oxygen with oxygen saturation (SaO2) of less than 90% or arterial blood gas partial pressure of oxygen (PO2) of less than 60 mm Hg (8.0 kPa) on room air [A] [B]          2/5/2024  9:56 AM              -- 2/5/2024  9:56 AM by Romina Wagner --                  SpO2 76%, PT. presents with cough, shortness of breath, chills, fever, abdominal pain, diarrhea and nausea, Tachycardia, wheezing and rhonchi, generalized abdominal tenderness. Ill-appearing.      [×] Altered mental status that is severe or persistent          2/5/2024  9:56 AM              -- 2/5/2024  9:56 AM by Romina Wagner --                                    (X) Altered mental status (ie, different from baseline) that is severe or persistent, as indicated by  1 or more  of the following  (1) (2) (3) (4):                  (X) Confusional state (eg, disorientation, difficulty following commands, deficit in attention) that persists (eg, for more than few hours) despite appropriate treatment (eg, of underlying cause)          2/5/2024  9:56 AM              -- 2/5/2024  9:56 AM by Romina Wagner --                  According to report patient has not been doing well for several days.  She has nobody with her and was unable to provide much of the history.     Notes:  -- 2/5/2024  9:56 AM by Romina Wagner --      Medications Given in the Emergency Department:      Medications      sodium chloride 0.9 % flush 10 mL (has no administration in time range)      acetaminophen (TYLENOL) tablet 975 mg (975 mg Oral Not Given 2/5/24 0147)      sodium chloride 0.9 % flush 10 mL (has no administration in time range)      sodium chloride 0.9 % flush 10 mL (has no administration in time range)      sodium chloride 0.9 % infusion 40 mL (has no administration in time  range)      sennosides-docusate (PERICOLACE) 8.6-50 MG per tablet 2 tablet (has no administration in time range)       And      polyethylene glycol (MIRALAX) packet 17 g (has no administration in time range)       And      bisacodyl (DULCOLAX) EC tablet 5 mg (has no administration in time range)       And      bisacodyl (DULCOLAX) suppository 10 mg (has no administration in time range)      nitroglycerin (NITROSTAT) SL tablet 0.4 mg (has no administration in time range)      sodium chloride 0.9 % infusion (has no administration in time range)      ondansetron (ZOFRAN) injection 4 mg (has no administration in time range)      norepinephrine (LEVOPHED) 8 mg in 250 mL NS infusion (premix) (has no administration in time range)      sodium chloride 0.9 % bolus 500 mL (has no administration in time range)      sodium chloride 0.9 % bolus 1,000 mL (0 mL Intravenous Stopped 2/5/24 0217)      sodium chloride 0.9 % infusion - ADS Override Pull (0 mL/hr Stopped 2/5/24 0318)      levoFLOXacin (LEVAQUIN) 750 mg/150 mL D5W (premix) (LEVAQUIN) 750 mg (0 mg Intravenous Stopped 2/5/24 0343)      acetaminophen (TYLENOL) suppository 650 mg (650 mg Rectal Given 2/5/24 0211)                    -- 2/5/2024  9:56 AM by Romina Wagner --      CT Abdomen: Impression:             Possible bibasilar pneumonia, including aspiration pneumonia. No other definite acute findings are      seen. Again, the study is very limited. Please see above comments for further detail.             JEN JONES JR, MD       Electronically Signed and Approved By: JEN JONES JR, MD on 2/05/2024 at 4:37                  CXR: Impression:       Decreased bilateral infiltrates are seen since the prior chest radiographic exam of      5/28/2023. The remaining infiltrates may represent mild pulmonary edema with vascular congestion.       Infectious multifocal pneumonia cannot be excluded but is thought to be less likely.                                              -- 2/5/2024  9:56 AM by Romina Wagner --      Past Medical History:      Diagnosis Date      • Anesthesia        REPORTS HAS GOT REAL EMOTIONAL AND HAS BECOME ANGRY BEFORE      • Anxiety       • Aortic stenosis, severe        HAS BIO VALVE REPLACED      • Arthritis       • Asthma       • Back pain       • Blood clotting disorder       • Cancer       • Cancer associated pain        HODGKINS LYMPHOMA      • CHF (congestive heart failure)       • Chronic low back pain with sciatica       • Chronic pain disorder       • COPD (chronic obstructive pulmonary disease)       • Coronary artery disease       • Diabetes mellitus        TYPE 2      • Diabetes mellitus       • Dyspnea on effort       • GERD (gastroesophageal reflux disease)       • H/O lumpectomy       • H/O: hysterectomy       • Hiatal hernia       • History of degenerative disc disease       • History of kidney stones       • History of pulmonary embolus (PE)       • History of transfusion        AS A CHILD NO TRANSFUSION REACTION      • History of transfusion       • Hodgkin's lymphoma        5/19/23 5 YEARS SINCE LAST CHEMO TX      • Hypertension       • Immobility       • Liver disease        DENIES ANY CURRENT ISSUES      • Lupus       • Mitral valve prolapse       • Panic disorder       • Pelvic pain       • Peripheral neuropathy       • Personal history of DVT (deep vein thrombosis)       • Presence of intrathecal pump       • PTSD (post-traumatic stress disorder)       • Sacroiliac joint disease       • SI (sacroiliac) joint inflammation       • Sleep apnea       • Venous insufficiency                     -- 2/5/2024  9:56 AM by Romina Wagner --      Here in the ED her initial vital signs were poor with low oxygen, low blood pressure, and the patient was very altered. She received 3 L of fluid however her blood pressure has remained low. Her workup is found a respiratory acidosis with a pCO2 of 45 PaO2 of 56.3 and a pH of 7.3. Her white blood  cell count was essentially normal although she did have a positive fecal occult. Patient is unable to say whether she is seeing any blood in her stool.      Review of Systems       Complete review of systems was difficult given patient's mental status. 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  ICD-9-CM: 161.1 6/23/2023 - Present    Hodgkin lymphoma ICD-10-CM: C81.90  ICD-9-CM: 201.90 6/19/2023 - Present    Acute MI ICD-10-CM: I21.9  ICD-9-CM: 410.90 5/30/2023 - Present    Septic shock ICD-10-CM: A41.9, R65.21  ICD-9-CM: 038.9, 785.52, 995.92 5/26/2023 - Present    Tobacco abuse ICD-10-CM: Z72.0  ICD-9-CM: 305.1 5/15/2023 - Present    Laryngeal cancer ICD-10-CM: C32.9  ICD-9-CM: 161.9 5/15/2023 - Present    Throat pain ICD-10-CM: R07.0  ICD-9-CM: 784.1 5/15/2023 - Present    Voice hoarseness ICD-10-CM: R49.0  ICD-9-CM: 784.42 5/15/2023 - Present    Primary osteoarthritis of right hip ICD-10-CM: M16.11  ICD-9-CM: 715.15 12/8/2022 - Present    Encounter for care related to vascular access port ICD-10-CM: Z45.2  ICD-9-CM: V58.81 11/17/2022 - Present    Obesity with body mass index 30 or greater ICD-10-CM: E66.9  ICD-9-CM: 278.00 9/13/2022 - Present    Neurologic disorder ICD-10-CM: G98.8  ICD-9-CM: 349.9 9/13/2022 - Present    Neck pain ICD-10-CM: M54.2  ICD-9-CM: 723.1 9/13/2022 - Present    Limb pain ICD-10-CM: M79.609  ICD-9-CM: 729.5 9/13/2022 - Present    Bladder disorder ICD-10-CM: N32.9  ICD-9-CM: 596.9 9/13/2022 - Present    Cellulitis of right foot ICD-10-CM: L03.115  ICD-9-CM: 682.7 9/13/2022 - Present    Bowel disease ICD-10-CM: K63.9  ICD-9-CM: 569.9 9/13/2022 - Present     Breast lump ICD-10-CM: N63.0  ICD-9-CM: 611.72 9/13/2022 - Present    Venous stasis ulcer of right calf ICD-10-CM: I83.012, L97.219  ICD-9-CM: 454.0 1/13/2022 - Present    Venous stasis ulcer of left calf ICD-10-CM: I83.022, L97.229  ICD-9-CM: 454.0 1/13/2022 - Present    Noncompliance ICD-10-CM: Z91.199  ICD-9-CM: V15.81 1/13/2022 - Present    Change in bowel habits ICD-10-CM: R19.4  ICD-9-CM: 787.99 10/20/2021 - Present    Nausea ICD-10-CM: R11.0  ICD-9-CM: 787.02 10/20/2021 - Present    Non-healing wound of lower extremity, subsequent encounter ICD-10-CM: S81.809D  ICD-9-CM: V58.89, 894.1 8/6/2021 - Present    MSSA (methicillin susceptible Staphylococcus aureus) infection ICD-10-CM: A49.01  ICD-9-CM: 041.11 8/6/2021 - Present    Mitral valve prolapse ICD-10-CM: I34.1  ICD-9-CM: 424.0 6/21/2021 - Present    Lupus (systemic lupus erythematosus) ICD-10-CM: M32.9  ICD-9-CM: 710.0 6/21/2021 - Present    Long term current use of anticoagulant therapy ICD-10-CM: Z79.01  ICD-9-CM: V58.61 6/21/2021 - Present    Venous hypertension of both lower extremities ICD-10-CM: I87.303  ICD-9-CM: 459.30 6/15/2021 - Present    Asthma ICD-10-CM: J45.909  ICD-9-CM: 493.90 6/6/2021 - Present    Kidney disease ICD-10-CM: N28.9  ICD-9-CM: 593.9 6/6/2021 - Present    Depression ICD-10-CM: F32.A  ICD-9-CM: 311 6/6/2021 - Present    GERD (gastroesophageal reflux disease) ICD-10-CM: K21.9  ICD-9-CM: 530.81 6/6/2021 - Present    S/P TAVR (transcatheter aortic valve replacement) ICD-10-CM: Z95.2  ICD-9-CM: V43.3 4/29/2021 - Present    Limited mobility ICD-10-CM: Z74.09  ICD-9-CM: V49.89 4/29/2021 - Present    Frailty ICD-10-CM: R54  ICD-9-CM: 797 4/29/2021 - Present    Hepatic steatosis ICD-10-CM: K76.0  ICD-9-CM: 571.8 9/16/2020 - Present    CAD (coronary artery disease) ICD-10-CM: I25.10  ICD-9-CM: 414.00 9/16/2020 - Present    Hiatal hernia ICD-10-CM: K44.9  ICD-9-CM: 553.3 9/16/2020 - Present    Hip pain ICD-10-CM: M25.559  ICD-9-CM: 719.45  6/23/2020 - Present    Anxiety disorder ICD-10-CM: F41.9  ICD-9-CM: 300.00 1/16/2020 - Present    Allergic rhinitis due to allergen ICD-10-CM: J30.9  ICD-9-CM: 477.9 1/16/2020 - Present    Arthritis ICD-10-CM: M19.90  ICD-9-CM: 716.90 1/16/2020 - Present    CHF (congestive heart failure) ICD-10-CM: I50.9  ICD-9-CM: 428.0 1/16/2020 - Present    Chronic obstructive pulmonary disease ICD-10-CM: J44.9  ICD-9-CM: 496 1/16/2020 - Present    Diabetes mellitus, type 2 ICD-10-CM: E11.9  ICD-9-CM: 250.00 1/16/2020 - Present    Nicotine dependence ICD-10-CM: F17.200  ICD-9-CM: 305.1 1/16/2020 - Present    History of Hodgkin's lymphoma ICD-10-CM: Z85.71  ICD-9-CM: V10.72 1/16/2020 - Present    Heart murmur ICD-10-CM: R01.1  ICD-9-CM: 785.2 1/16/2020 - Present    Peripheral vascular disease ICD-10-CM: I73.9  ICD-9-CM: 443.9 1/16/2020 - Present    Atrophy of skin ICD-10-CM: L90.9  ICD-9-CM: 701.9 1/16/2020 - Present    Nonrheumatic aortic valve stenosis ICD-10-CM: I35.0  ICD-9-CM: 424.1 2/22/2019 - Present    Aortic stenosis, severe ICD-10-CM: I35.0  ICD-9-CM: 424.1 11/21/2018 - Present    Dyspnea on effort ICD-10-CM: R06.09  ICD-9-CM: 786.09 11/21/2018 - Present    Other pulmonary embolism without acute cor pulmonale ICD-10-CM: I26.99  ICD-9-CM: 415.19 11/21/2018 - Present    Pelvic pain ICD-10-CM: R10.2  ICD-9-CM: [id number] 11/20/2018 - Present    Chronic pain syndrome ICD-10-CM: G89.4  ICD-9-CM: 338.4 3/6/2018 - Present    Sacroiliac inflammation ICD-10-CM: M46.1  ICD-9-CM: 720.2 1/17/2018 - Present    Cancer associated pain ICD-10-CM: G89.3  ICD-9-CM: 338.3 4/5/2017 - Present    Presence of intrathecal pump ICD-10-CM: Z97.8  ICD-9-CM: V45.89 4/5/2017 - Present    Chronic low back pain with bilateral sciatica ICD-10-CM: M54.41, M54.42, G89.29  ICD-9-CM: 724.2, 724.3, 338.29 4/5/2017 - Present        History & Physical        Jovany Bennett DO at 02/05/24 0538          [facility]   HISTORY AND PHYSICAL    Patient Name:  Isha Llanes  : 1965  MRN: [id number]  Primary Care Physician:  Katia Wright MD  Date of admission: 2024    Subjective  Subjective     Chief Complaint:   Altered mental status  History of Present Illness  Patient is a 58-year-old female with past medical history Hodgkin's lymphoma with her last chemo being 6 weeks ago, CHF, COPD, CAD, diabetes mellitus, hypertension, lupus, PTSD aortic stenosis status post valve replacement.  Patient presented to the emergency department because of altered mental status, abdominal pain and respiratory distress.  According to report patient has not been doing well for several days.  She has nobody with her and was unable to provide much of the history.  She says she is hurting all over and asking for pain medication.  Patient has an implanted Dilaudid pump.  Here in the ED her initial vital signs were poor with low oxygen, low blood pressure, and the patient was very altered.  She received 3 L of fluid however her blood pressure has remained low.  Her workup is found a respiratory acidosis with a pCO2 of 45 PaO2 of 56.3 and a pH of 7.3.  Her white blood cell count was essentially normal although she did have a positive fecal occult.  Patient is unable to say whether she is seeing any blood in her stool.  Review of Systems   Complete review of systems was difficult given patient's mental status.  She was moaning complaining of pain all over  Personal History     Past Medical History:   Diagnosis Date   • Anesthesia     REPORTS HAS GOT REAL EMOTIONAL AND HAS BECOME ANGRY BEFORE   • Anxiety    • Aortic stenosis, severe     HAS BIO VALVE REPLACED   • Arthritis    • Asthma    • Back pain    • Blood clotting disorder    • Cancer    • Cancer associated pain     HODGKINS LYMPHOMA   • CHF (congestive heart failure)    • Chronic low back pain with sciatica    • Chronic pain disorder    • COPD (chronic obstructive pulmonary disease)    • Coronary artery disease    • Diabetes  mellitus     TYPE 2   • Diabetes mellitus    • Dyspnea on effort    • GERD (gastroesophageal reflux disease)    • H/O lumpectomy    • H/O: hysterectomy    • Hiatal hernia    • History of degenerative disc disease    • History of kidney stones    • History of pulmonary embolus (PE)    • History of transfusion     AS A CHILD NO TRANSFUSION REACTION   • History of transfusion    • Hodgkin's lymphoma     5/19/23  5 YEARS SINCE LAST CHEMO TX   • Hypertension    • Immobility    • Liver disease     DENIES ANY CURRENT ISSUES   • Lupus    • Mitral valve prolapse    • Panic disorder    • Pelvic pain    • Peripheral neuropathy    • Personal history of DVT (deep vein thrombosis)    • Presence of intrathecal pump    • PTSD (post-traumatic stress disorder)    • Sacroiliac joint disease    • SI (sacroiliac) joint inflammation    • Sleep apnea    • Venous insufficiency        Past Surgical History:   Procedure Laterality Date   • ABDOMINAL SURGERY     • BACK SURGERY      SPINAL FUSION    • BACK SURGERY     • BLADDER REPAIR     • CARDIAC CATHETERIZATION N/A 03/06/2019    Procedure: Valvuloplasty;  Surgeon: Wayne Johnson MD;  Location: [facility] INVASIVE LOCATION;  Service: Cardiology   • CARDIAC CATHETERIZATION     • CARDIAC CATHETERIZATION Left 5/31/2023    Procedure: Cardiac Catheterization/Vascular Study;  Surgeon: Poncho Reid MD;  Location: [facility] CATH INVASIVE LOCATION;  Service: Cardiovascular;  Laterality: Left;   • CARDIAC SURGERY     • CARDIAC VALVE REPLACEMENT  2021   • CHOLECYSTECTOMY     • COLONOSCOPY N/A 12/29/2021    Procedure: COLONOSCOPY;  Surgeon: Karine Orlando MD;  Location: [facility] ENDOSCOPY;  Service: Gastroenterology;  Laterality: N/A;  POOR PREP   • COLONOSCOPY N/A 04/13/2022    Procedure: COLONOSCOPY WITH POLYPECTOMY;  Surgeon: Karine Orlando MD;  Location: [facility] ENDOSCOPY;  Service: Gastroenterology;  Laterality: N/A;  COLON POLYP, HEMORRHOIDS, POOR PREP   •  COLONOSCOPY     • DIRECT LARYNGOSCOPY, ESOPHAGOSCOPY, BRONCHOSCOPY N/A 5/22/2023    Procedure: DIRECT LARYNGOSCOPY WITH BIOPSIES , ESOPHAGOSCOPY, BRONCHOSCOPY;  Surgeon: Ronnie Mcgarry MD;  Location: [facility] OR [facility];  Service: ENT;  Laterality: N/A;   • ENDOSCOPY N/A 12/29/2021    Procedure: ESOPHAGOGASTRODUODENOSCOPY;  Surgeon: Karine Orlando MD;  Location: [facility] ENDOSCOPY;  Service: Gastroenterology;  Laterality: N/A;  ESOPHAGITIS, GASTRITIS, HIATAL HERNIA   • ENDOSCOPY     • HYSTERECTOMY     • LYMPHADENECTOMY     • PAIN PUMP INSERTION/REVISION N/A 10/18/2019    Procedure: PAIN PUMP INSERTION \[facility] [place] [facility]\"" 10-18-19 [place];  Surgeon: Horace Rios MD;  Location: [facility];  Service: Pain Management   • PAIN PUMP INSERTION/REVISION N/A 11/23/2020    Procedure: pain pump removal;  Surgeon: Horace Rios MD;  Location: [facility];  Service: Pain Management;  Laterality: N/A;   • PEG TUBE INSERTION N/A 7/26/2023    Procedure: PERCUTANEOUS ENDOSCOPIC GASTROSTOMY TUBE INSERTION;  Surgeon: Kody Mercer MD;  Location: [facility] ENDOSCOPY;  Service: General;  Laterality: N/A;  SUCCESSFUL PEG TUBE PLACE PLACEMENT   • PORTACATH PLACEMENT      IN LEFT CHEST REPORTS THAT IT IS NOT FUNCTIONING   • SKIN BIOPSY     • TONSILLECTOMY     • TUBAL ABDOMINAL LIGATION     • TUMOR REMOVAL         Family History: family history includes ADD / ADHD in her brother, granddaughter, grandson, and nephew; Anxiety disorder in her mother; Bipolar disorder in her sister; Depression in her sister; Heart failure in her mother; Pancreatic cancer in her paternal grandmother and sister; Prostate cancer in her father; Thyroid cancer in her maternal grandmother. Otherwise pertinent FHx was reviewed and not pertinent to current issue.    Social History:  reports that she has been smoking cigarettes. She started smoking about 45 years ago. She has a 30.00 pack-year smoking history. She has never used smokeless tobacco. She  "reports that she does not drink alcohol and does not use drugs.    Home Medications:  ACE Elastic Bandage 4\", ALPRAZolam, Acetaminophen Extra Strength, HYDROcodone-acetaminophen, HYDROmorphone, HYDROmorphone HCl, Insulin Lispro (1 Unit Dial), Insulin Pen Needle, Insulin Syringe, Lancets, SITagliptin, albuterol sulfate HFA, azithromycin, bacitracin, cetirizine, clopidogrel, furosemide, gabapentin, glucose blood, glucose monitor, hydroxychloroquine, insulin detemir, loperamide, mupirocin, ondansetron, pain, and predniSONE    Allergies:  Allergies   Allergen Reactions   • Amoxicillin Shortness Of Breath   • Ceclor [Cefaclor] Shortness Of Breath   • Penicillins Shortness Of Breath   • Sulfa Antibiotics Rash   • Valium [Diazepam] Mental Status Change     DEPRESSED   • Ambien [Zolpidem] Mental Status Change   • Aspirin GI Intolerance   • Ativan [Lorazepam] Mental Status Change   • Benadryl [Diphenhydramine] Anxiety     AND MAKES HER HYPER   • Biaxin [Clarithromycin] Unknown - Low Severity   • Cefazolin Unknown - Low Severity   • Cephalexin Unknown - Low Severity   • Cephalosporins Unknown - Low Severity   • Clindamycin Unknown - Low Severity   • Compazine [Prochlorperazine Edisylate] Rash   • Contrast Dye (Echo Or Unknown Ct/Mr) Other (See Comments)     Caused pain in her arm   • Doxycycline Rash   • Eggs Or Egg-Derived Products GI Intolerance   • Nsaids GI Intolerance   • Phenergan [Promethazine Hcl] GI Intolerance   • Promethazine GI Intolerance   • Vancomycin Itching       Objective   Objective     Vitals:   Temp:  [98.1 °F (36.7 °C)-102.4 °F (39.1 °C)] 98.1 °F (36.7 °C)  Heart Rate:  [104-140] 104  Resp:  [16-26] 17  BP: ()/(50-80) 78/57  Flow (L/min):  [5] 5    Physical Exam  Constitutional: Patient appears acutely ill and older than stated age      HENT:  Head:  Normocephalic and atraumatic.  Mouth:  Moist mucous membranes.    Eyes:  Conjunctivae and EOM are normal. No scleral icterus.    Neck:  Neck supple.  " No JVD present.    Cardiovascular: Tachycardic rate, regular rhythm and normal heart sounds with no murmur.  Pulmonary/Chest:  No respiratory distress, no wheezes, no crackles, with normal breath sounds and good air movement.  Abdominal:  Soft. No distension and no tenderness.   Musculoskeletal:  No tenderness, and no deformity.  No red or swollen joints anywhere.    Neurological: Arousable and oriented to person place.  No cranial nerve deficit.    Skin: Several ulcerations bilateral lower extremities with associated erythema warmth redness of the right lower extremity  Peripheral vascular:  No clubbing, no cyanosis, no edema.  Psychiatric: Appropriate mood and affect  : Deferred  Result Review   Result Review:  I have personally reviewed the results from the time of this admission to 2/5/2024 05:38 EST and agree with these findings:  [x]  Laboratory list / accordion  []  Microbiology  [x]  Radiology  []  EKG/Telemetry   []  Cardiology/Vascular   []  Pathology  []  Old records  []  Other:  Most notable findings include:       Assessment & Plan  Assessment / Plan     Brief Patient Summary:  Isha Llanes is a 58 y.o. female who presents to the emergency department with altered mental status, abdominal pain and shortness of breath.  She was found to have bibasilar pneumonia and is clinically septic.  Started on levo in the ED.  She will be admitted to the intensive care unit once beds available    Active Hospital Problems:  1.  Clinical sepsis  2.  Pneumonia  3.  Hypotension requiring pressors  4.  Acute hypoxic hypercapnic respiratory failure  5.  Respiratory acidosis  6.  Possible GI bleed  7.  Mild cellulitis right lower extremity  8.  Hodgkin's lymphoma    Plan:   Patient will be admitted to the hospitalist service  She will have to be placed in the intensive care unit on pressors  I have asked pharmacy to evaluate her allergy list and recommend antibiotic to continue.  Patient reported she could only  take Levaquin  BiPAP to be attempted in the ICU.  Patient refused it in the ED  Patient refused CTA due to contrast allergy, patient has 22 drug allergies ported  Consult GI for possible upper GI bleed  Start Protonix  Consult wound care for the wounds on her lower extremities  The patient will be kept on telemetry for closer monitoring      DVT prophylaxis:  Mechanical DVT prophylaxis orders are signed and held.          CODE STATUS:    Code Status (Patient has no pulse and is not breathing): CPR (Attempt to Resuscitate)  Medical Interventions (Patient has pulse or is breathing): Full Support    Admission Status:  I believe this patient meets inpatient status.    Jovany Bennett DO    Electronically signed by Jovany Bennett DO at 02/05/24 0621          Emergency Department Notes        Bethanie Allred MD at 02/05/24 0704          Time: 7:04 AM EST  Date of encounter:  2/5/2024  Independent Historian/Clinical History and Information was obtained by:   Patient    History is limited by: N/A    Chief Complaint: AMS, fever, abdominal pain      History of Present Illness:  Patient is a 58 y.o. year old female who presents to the emergency department for evaluation of Altered mental status, fever and abdominal pain.  Patient has a history of Hodgkin's lymphoma, CHF, COPD, CAD, diabetes, hypertension, lupus, aortic stenosis status posts valve repair.  She presented to the emergency department with altered mental status, abdominal pain and respiratory distress.  She says she has not been feeling well for several days.  She reports pain all over.  She reports nonproductive cough.  She reports pain in upper abdomen    HPI    Patient Care Team  Primary Care Provider: Katia Wright MD    Past Medical History:     Allergies   Allergen Reactions   • Amoxicillin Shortness Of Breath   • Ceclor [Cefaclor] Shortness Of Breath   • Penicillins Shortness Of Breath   • Sulfa Antibiotics Rash   • Valium [Diazepam] Mental Status  Change     DEPRESSED   • Ambien [Zolpidem] Mental Status Change   • Aspirin GI Intolerance   • Ativan [Lorazepam] Mental Status Change   • Benadryl [Diphenhydramine] Anxiety     AND MAKES HER HYPER   • Biaxin [Clarithromycin] Unknown - Low Severity   • Cefazolin Unknown - Low Severity   • Cephalexin Unknown - Low Severity   • Cephalosporins Unknown - Low Severity   • Clindamycin Unknown - Low Severity   • Compazine [Prochlorperazine Edisylate] Rash   • Contrast Dye (Echo Or Unknown Ct/Mr) Other (See Comments)     Caused pain in her arm   • Doxycycline Rash   • Eggs Or Egg-Derived Products GI Intolerance   • Nsaids GI Intolerance   • Phenergan [Promethazine Hcl] GI Intolerance   • Promethazine GI Intolerance   • Vancomycin Itching     Past Medical History:   Diagnosis Date   • Anesthesia     REPORTS HAS GOT REAL EMOTIONAL AND HAS BECOME ANGRY BEFORE   • Anxiety    • Aortic stenosis, severe     HAS BIO VALVE REPLACED   • Arthritis    • Asthma    • Back pain    • Blood clotting disorder    • Cancer    • Cancer associated pain     HODGKINS LYMPHOMA   • CHF (congestive heart failure)    • Chronic low back pain with sciatica    • Chronic pain disorder    • COPD (chronic obstructive pulmonary disease)    • Coronary artery disease    • Diabetes mellitus     TYPE 2   • Diabetes mellitus    • Dyspnea on effort    • GERD (gastroesophageal reflux disease)    • H/O lumpectomy    • H/O: hysterectomy    • Hiatal hernia    • History of degenerative disc disease    • History of kidney stones    • History of pulmonary embolus (PE)    • History of transfusion     AS A CHILD NO TRANSFUSION REACTION   • History of transfusion    • Hodgkin's lymphoma     5/19/23  5 YEARS SINCE LAST CHEMO TX   • Hypertension    • Immobility    • Liver disease     DENIES ANY CURRENT ISSUES   • Lupus    • Mitral valve prolapse    • Panic disorder    • Pelvic pain    • Peripheral neuropathy    • Personal history of DVT (deep vein thrombosis)    • Presence of  intrathecal pump    • PTSD (post-traumatic stress disorder)    • Sacroiliac joint disease    • SI (sacroiliac) joint inflammation    • Sleep apnea    • Venous insufficiency      Past Surgical History:   Procedure Laterality Date   • ABDOMINAL SURGERY     • BACK SURGERY      SPINAL FUSION    • BACK SURGERY     • BLADDER REPAIR     • CARDIAC CATHETERIZATION N/A 03/06/2019    Procedure: Valvuloplasty;  Surgeon: Wayne Johnson MD;  Location: [facility] INVASIVE LOCATION;  Service: Cardiology   • CARDIAC CATHETERIZATION     • CARDIAC CATHETERIZATION Left 5/31/2023    Procedure: Cardiac Catheterization/Vascular Study;  Surgeon: Poncho Reid MD;  Location: [facility] CATH INVASIVE LOCATION;  Service: Cardiovascular;  Laterality: Left;   • CARDIAC SURGERY     • CARDIAC VALVE REPLACEMENT  2021   • CHOLECYSTECTOMY     • COLONOSCOPY N/A 12/29/2021    Procedure: COLONOSCOPY;  Surgeon: Karine Orlando MD;  Location: [facility] ENDOSCOPY;  Service: Gastroenterology;  Laterality: N/A;  POOR PREP   • COLONOSCOPY N/A 04/13/2022    Procedure: COLONOSCOPY WITH POLYPECTOMY;  Surgeon: Karine Orlando MD;  Location: [facility] ENDOSCOPY;  Service: Gastroenterology;  Laterality: N/A;  COLON POLYP, HEMORRHOIDS, POOR PREP   • COLONOSCOPY     • DIRECT LARYNGOSCOPY, ESOPHAGOSCOPY, BRONCHOSCOPY N/A 5/22/2023    Procedure: DIRECT LARYNGOSCOPY WITH BIOPSIES , ESOPHAGOSCOPY, BRONCHOSCOPY;  Surgeon: Ronnie Mcgarry MD;  Location: [facility] OR OSC;  Service: ENT;  Laterality: N/A;   • ENDOSCOPY N/A 12/29/2021    Procedure: ESOPHAGOGASTRODUODENOSCOPY;  Surgeon: Karine Orlando MD;  Location: [facility] ENDOSCOPY;  Service: Gastroenterology;  Laterality: N/A;  ESOPHAGITIS, GASTRITIS, HIATAL HERNIA   • ENDOSCOPY     • HYSTERECTOMY     • LYMPHADENECTOMY     • PAIN PUMP INSERTION/REVISION N/A 10/18/2019    Procedure: PAIN PUMP INSERTION [facility] 10-18-19 [place];  Surgeon: Horace Rios MD;  Location: [facility] OR;   Service: Pain Management   • PAIN PUMP INSERTION/REVISION N/A 11/23/2020    Procedure: pain pump removal;  Surgeon: Horace Rios MD;  Location: [facility] MAIN OR;  Service: Pain Management;  Laterality: N/A;   • PEG TUBE INSERTION N/A 7/26/2023    Procedure: PERCUTANEOUS ENDOSCOPIC GASTROSTOMY TUBE INSERTION;  Surgeon: Kody Mercer MD;  Location:  [facility]OS[facility];  Service: General;  Laterality: N/A;  SUCCESSFUL PEG TUBE PLACE PLACEMENT   • PORTACATH PLACEMENT      IN LEFT CHEST REPORTS THAT IT IS NOT FUNCTIONING   • SKIN BIOPSY     • TONSILLECTOMY     • TUBAL ABDOMINAL LIGATION     • TUMOR REMOVAL       Family History   Problem Relation Age of Onset   • Heart failure Mother    • Anxiety disorder Mother    • Prostate cancer Father    • Depression Sister    • Bipolar disorder Sister    • Pancreatic cancer Sister    • ADD / ADHD Brother    • Thyroid cancer Maternal Grandmother    • Pancreatic cancer Paternal Grandmother    • ADD / ADHD Grandson    • ADD / ADHD Granddaughter    • ADD / ADHD Nephew    • Malig Hyperthermia Neg Hx        Home Medications:  Prior to Admission medications    Medication Sig Start Date End Date Taking? Authorizing Provider   albuterol sulfate  (90 Base) MCG/ACT inhaler Inhale 2 puffs Every 4 (Four) Hours As Needed for Wheezing. 11/20/23   Katia Wright MD   ALPRAZolam (Xanax) 0.5 MG tablet Take 1 tablet by mouth 2 (Two) Times a Day As Needed for Anxiety for up to 60 days. 1/19/24 3/19/24  Katia Wright MD   bacitracin 500 UNIT/GM ointment Apply 1 Application topically to the appropriate area as directed 2 (Two) Times a Day. 1/30/24   Katia Wright MD   cetirizine (zyrTEC) 10 MG tablet Take 1 tablet by mouth Daily. 10/31/23   Nette Jiménez MD   clopidogrel (PLAVIX) 75 MG tablet Take 1 tablet by mouth Daily. 2/27/23   Nette Jiménez MD   furosemide (LASIX) 40 MG tablet Take 1 tablet by mouth Daily.    Nette Jiménez MD   gabapentin (NEURONTIN) 300 MG capsule  "Take 1 capsule by mouth 4 (Four) Times a Day.    Nette Jiménez MD   HYDROcodone-acetaminophen (NORCO) 5-325 MG per tablet Take 1 tablet by mouth Daily As Needed for Moderate Pain.    Nette Jiménez MD   hydroxychloroquine (PLAQUENIL) 200 MG tablet Take 1 tablet by mouth Daily.    Nette Jiménez MD   mupirocin (BACTROBAN) 2 % ointment Apply 1 application  topically to the appropriate area as directed 3 (Three) Times a Day. 11/16/23   Katia Wright MD   ondansetron (Zofran) 4 MG tablet Take 1 tablet by mouth Every 12 (Twelve) Hours As Needed for Nausea or Vomiting. 1/22/24   Katia Wright MD   pain patient supplied pump by Intrathecal route Continuous.   Type of Medication: Hydromorphone  and Bupivacaine  Provider:Dr. Boudreaux  Provider number: [phone]  Basal Dose: Hydromorphone 7.518 mg/day Bupivacaine 0.13597 mg/day  ( MAX of Hydromorphone 9.940 mg/ day; Bupivacaine 0.38424 mg /day)  Bolus Dose:Hydromorphone 0.500 mg, Bupivacaine 0.50338 mg  Lockout 3 hours. 1 Bolus per every 3 hours max of 5 bolus per day  Next refill due: July 13 2023  Pump manufacture:PAYMILL    Nette Jiménez MD   predniSONE (DELTASONE) 5 MG tablet Take 1 tablet by mouth Daily for 30 days. 1/9/24 2/8/24  Katia Wright MD   Acetaminophen Extra Strength 500 MG tablet TAKE 2 TO 3 TABLETS BY MOUTH EVERY 6 HOURS AS NEEDED FOR MILD PAIN 7/10/23 2/5/24  Vidal Luevano APRN   azithromycin (ZITHROMAX) 250 MG tablet Take 2 tablets PO the first day, then 1 tablet PO daily for 4 days. 1/9/24 2/5/24  Ktaia Wright MD   B-D UF III MINI PEN NEEDLES 31G X 5 MM misc Inject 100 each under the skin into the appropriate area as directed 4 (Four) Times a Day. 4/28/23 2/5/24  Vidal Luevano APRN   Elastic Bandages & Supports (ACE Elastic Bandage 4\") misc 1 Device Daily. One on each leg, bilateral.   #24 = one month supply. 9/30/22 2/5/24  Vidal Luevano APRN   furosemide (LASIX) 40 MG tablet Take 1 tablet by mouth Daily. 9/29/23 " "2/5/24  Katia Wright MD   gabapentin (NEURONTIN) 300 MG capsule TAKE ONE CAPSULE BY MOUTH THREE TIMES DAILY  Patient taking differently: Take 1 capsule by mouth 4 (Four) Times a Day As Needed. 2/22/21 2/5/24  Horace Rios MD   glucose blood test strip Use as instructed 4/12/23 2/5/24  Vidal Leuvano APRN   glucose monitor monitoring kit 1 each As Needed (Test blood sugar TID). 4/12/23 2/5/24  Vidal Luevano APRN   HYDROcodone-acetaminophen (Norco) 5-325 MG per tablet Take 1 tablet by mouth As Needed for Moderate Pain for up to 30 days. 1/9/24 2/5/24  Katia Wright MD   HYDROmorphone (DILAUDID) 1 mg/mL solution Infuse  into a venous catheter Dose Adjusted By Provider As Needed. PER PAIN PUMP  2/5/24  ProviderNette MD   HYDROmorphone HCl (DILAUDID) 0.2 MG/ML injection   2/5/24  ProviderNette MD   hydroxychloroquine (Plaquenil) 200 MG tablet Take 1 tablet by mouth Daily for 90 days. Indications: Rheumatoid Arthritis  Patient taking differently: Take 1 tablet by mouth Daily. 12/13/23 2/5/24  Katia Wright MD   insulin detemir (LEVEMIR) 100 UNIT/ML injection Inject 10 Units under the skin into the appropriate area as directed Daily for 90 days. 5/9/23 2/5/24  Vidal Luevano APRN   Insulin Lispro, 1 Unit Dial, (HUMALOG) 100 UNIT/ML solution pen-injector Inject 100 Units as directed Daily With Dinner. Inject 1to 10 units with meals per sliding scale, max of 30 units per day 11/28/23 2/5/24  Katia Wright MD   Insulin Syringe 31G X 5/16\" 1 ML misc 1 each Daily. 5/9/23 2/5/24  Vidal Luevano APRN   Lancets misc 1 each 3 (Three) Times a Day. 4/28/23 2/5/24  Vidal Luevano APRN   loperamide (IMODIUM) 2 MG capsule Take 1 capsule by mouth 4 (Four) Times a Day As Needed for Diarrhea. 9/15/23 2/5/24  Goyo Nunez MD   SITagliptin (JANUVIA) 25 MG tablet Daily.  2/5/24  Provider, MD Nette        Social History:   Social History     Tobacco Use   • Smoking status: Every Day     Packs/day: " "1.50     Years: 20.00     Additional pack years: 0.00     Total pack years: 30.00     Types: Cigarettes     Start date: 1979   • Smokeless tobacco: Never   Vaping Use   • Vaping Use: Never used   Substance Use Topics   • Alcohol use: Never   • Drug use: Never         Review of Systems:  Review of Systems   Constitutional:  Positive for chills and fever.   HENT:  Negative for congestion, ear pain and sore throat.    Eyes:  Negative for pain.   Respiratory:  Positive for cough and shortness of breath. Negative for chest tightness.    Cardiovascular:  Negative for chest pain.   Gastrointestinal:  Positive for abdominal pain, diarrhea and nausea. Negative for vomiting.   Genitourinary:  Negative for flank pain and hematuria.   Musculoskeletal:  Negative for joint swelling.   Skin:  Negative for pallor.   Neurological:  Negative for seizures and headaches.   All other systems reviewed and are negative.       Physical Exam:  BP (!) 71/50 (BP Location: Right arm, Patient Position: Lying)   Pulse 100   Temp 97.8 °F (36.6 °C) (Oral)   Resp 23   Ht 162.6 cm (64\")   Wt 60.4 kg (133 lb 2.5 oz)   SpO2 99%   BMI 22.86 kg/m²     Physical Exam  Constitutional:       Appearance: She is ill-appearing.   HENT:      Head: Normocephalic and atraumatic.      Mouth/Throat:      Mouth: Mucous membranes are moist.   Eyes:      Extraocular Movements: Extraocular movements intact.      Pupils: Pupils are equal, round, and reactive to light.   Cardiovascular:      Rate and Rhythm: Regular rhythm. Tachycardia present.   Pulmonary:      Breath sounds: Wheezing and rhonchi present.   Abdominal:      General: There is no distension.      Palpations: Abdomen is soft.      Tenderness: There is generalized abdominal tenderness.   Skin:     General: Skin is warm.      Capillary Refill: Capillary refill takes less than 2 seconds.   Neurological:      General: No focal deficit present.                  Procedures:  Procedures      Medical " Decision Making:      Comorbidities that affect care:    Lupus, aortic stenosis, Asthma, Cancer, Congestive Heart Failure, COPD, Diabetes, Hypertension    External Notes reviewed:    Previous Clinic Note: Patient seen by PCP on 1/9/2024 for osteoarthritis of hip, lupus, inflammatory arthritis, malignant neoplasm of supraglottis, COPD, coronary artery disease, anxiety, chronic pain.      The following orders were placed and all results were independently analyzed by me:  Orders Placed This Encounter   Procedures   • Blood Culture - Blood,   • Blood Culture - Blood,   • COVID-19, FLU A/B, RSV PCR 1 HR TAT - Swab, Nasopharynx   • XR Chest 1 View   • CT Abdomen Pelvis Without Contrast   • [place] Draw   • Comprehensive Metabolic Panel   • Protime-INR   • aPTT   • Magnesium   • Phosphorus   • Lactic Acid, Plasma   • C-reactive Protein   • Blood Gas, Arterial -With Co-Ox Panel: Yes   • Urinalysis With Microscopic If Indicated (No Culture) - Urine, Catheter   • CBC Auto Differential   • VBG with Co-Ox and Electrolytes   • Occult Blood, Fecal By Immunoassay - Stool, Per Rectum   • High Sensitivity Troponin T   • BNP   • High Sensitivity Troponin T 2Hr   • Urinalysis, Microscopic Only - Urine, Clean Catch   • Procalcitonin   • Comprehensive Metabolic Panel   • CBC Auto Differential   • Lactic Acid, Plasma   • Magnesium   • Diet: Regular/House Diet; Texture: Regular Texture (IDDSI 7); Fluid Consistency: Thin (IDDSI 0)   • Undress & Gown   • Continuous Pulse Oximetry   • Measure Blood Pressure   • Vital Signs   • Vital Signs   • Intake & Output   • Weigh Patient   • Oral Care   • Place Sequential Compression Device   • Maintain Sequential Compression Device   • Telemetry - Maintain IV Access   • Telemetry - Place Orders & Notify Provider of Results When Patient Experiences Acute Chest Pain, Dysrhythmia or Respiratory Distress   • Activity - Bed Rest With Exceptions (Specify)   • Notify Provider (With Default Parameters)   •  Code Status and Medical Interventions:   • Inpatient Hospitalist Consult   • Inpatient Pulmonology Consult   • Pharmacy to Review Allergies & Recommend Antimicrobials   • OT Consult: Eval & Treat   • PT Consult: Eval & Treat Functional Mobility Below Baseline   • Oxygen Therapy- Nasal Cannula; Titrate 1-6 LPM Per SpO2; 90 - 95%   • Oxygen Therapy- Nasal Cannula; Titrate 1-6 LPM Per SpO2; 90 - 95%   • ECG 12 Lead Other; Sepsis   • Wound Ostomy Eval & Treat   • Insert Large Bore Peripheral IV   • Insert 2nd Large Bore Peripheral IV   • Insert Peripheral IV   • Inpatient Admission   • CBC & Differential   • Green Top (Gel)   • Lavender Top   • Gold Top - SST   • Light Blue Top       Medications Given in the Emergency Department:  Medications   sodium chloride 0.9 % flush 10 mL (has no administration in time range)   acetaminophen (TYLENOL) tablet 975 mg (975 mg Oral Not Given 2/5/24 0147)   sodium chloride 0.9 % flush 10 mL (has no administration in time range)   sodium chloride 0.9 % flush 10 mL (has no administration in time range)   sodium chloride 0.9 % infusion 40 mL (has no administration in time range)   sennosides-docusate (PERICOLACE) 8.6-50 MG per tablet 2 tablet (has no administration in time range)     And   polyethylene glycol (MIRALAX) packet 17 g (has no administration in time range)     And   bisacodyl (DULCOLAX) EC tablet 5 mg (has no administration in time range)     And   bisacodyl (DULCOLAX) suppository 10 mg (has no administration in time range)   nitroglycerin (NITROSTAT) SL tablet 0.4 mg (has no administration in time range)   sodium chloride 0.9 % infusion (has no administration in time range)   ondansetron (ZOFRAN) injection 4 mg (has no administration in time range)   norepinephrine (LEVOPHED) 8 mg in 250 mL NS infusion (premix) (has no administration in time range)   sodium chloride 0.9 % bolus 500 mL (has no administration in time range)   sodium chloride 0.9 % bolus 1,000 mL (0 mL  Intravenous Stopped 2/5/24 0217)   sodium chloride 0.9 % infusion  - ADS Override Pull (0 mL/hr  Stopped 2/5/24 0318)   levoFLOXacin (LEVAQUIN) 750 mg/150 mL D5W (premix) (LEVAQUIN) 750 mg (0 mg Intravenous Stopped 2/5/24 0343)   acetaminophen (TYLENOL) suppository 650 mg (650 mg Rectal Given 2/5/24 0211)        ED Course:    ED Course as of 02/05/24 0712 Mon Feb 05, 2024   0709 ECG 12 Lead Other; Sepsis  Sinus tachycardia with rate of 134.  PVCs present.  Normal MA and QTc.  LVH.  EKG interpreted by me [LD]      ED Course User Index  [LD] Bethanie Allred MD       Labs:    Lab Results (last 24 hours)       Procedure Component Value Units Date/Time    Blood Culture - Blood, Arm, Left [[id number]] Collected: 02/05/24 0112    Specimen: Blood from Arm, Left Updated: 02/05/24 0112    Blood Culture - Blood, Arm, Right [[id number]] Collected: 02/05/24 0112    Specimen: Blood from Arm, Right Updated: 02/05/24 0112    CBC & Differential [[id number]]  (Abnormal) Collected: 02/05/24 0113    Specimen: Blood Updated: 02/05/24 0116    Narrative:      The following orders were created for panel order CBC & Differential.  Procedure                               Abnormality         Status                     ---------                               -----------         ------                     CBC Auto Differential[[id number]]        Abnormal            Final result                 Please view results for these tests on the individual orders.    Protime-INR [[id number]]  (Normal) Collected: 02/05/24 0113    Specimen: Blood Updated: 02/05/24 0127     Protime 14.3 Seconds      INR 1.09    Narrative:      Suggested Therapeutic Ranges For Oral Anticoagulant Therapy:  Level of Therapy                      INR Target Range  Standard Dose                            2.0-3.0  High Dose                                2.5-3.5  Patients not receiving anticoagulant  Therapy Normal Range                     0.86-1.15    aPTT [[id number]]   (Normal) Collected: 02/05/24 0113    Specimen: Blood Updated: 02/05/24 0127     PTT 29.9 seconds     Lactic Acid, Plasma [[id number]]  (Normal) Collected: 02/05/24 0113    Specimen: Blood Updated: 02/05/24 0133     Lactate 0.8 mmol/L     CBC Auto Differential [[id number]]  (Abnormal) Collected: 02/05/24 0113    Specimen: Blood Updated: 02/05/24 0116     WBC 8.50 10*3/mm3      RBC 4.46 10*6/mm3      Hemoglobin 12.5 g/dL      Hematocrit 39.7 %      MCV 89.0 fL      MCH 28.0 pg      MCHC 31.5 g/dL      RDW 14.6 %      RDW-SD 47.4 fl      MPV 10.6 fL      Platelets 150 10*3/mm3      Neutrophil % 87.7 %      Lymphocyte % 4.6 %      Monocyte % 6.4 %      Eosinophil % 0.7 %      Basophil % 0.1 %      Immature Grans % 0.5 %      Neutrophils, Absolute 7.46 10*3/mm3      Lymphocytes, Absolute 0.39 10*3/mm3      Monocytes, Absolute 0.54 10*3/mm3      Eosinophils, Absolute 0.06 10*3/mm3      Basophils, Absolute 0.01 10*3/mm3      Immature Grans, Absolute 0.04 10*3/mm3      nRBC 0.0 /100 WBC     Occult Blood, Fecal By Immunoassay - Stool, Per Rectum [[id number]]  (Abnormal) Collected: 02/05/24 0118    Specimen: Stool from Per Rectum Updated: 02/05/24 0201     Occult Blood, Fecal by Immunoassay Positive    Comprehensive Metabolic Panel [[id number]]  (Abnormal) Collected: 02/05/24 0136    Specimen: Blood Updated: 02/05/24 0209     Glucose 126 mg/dL      BUN 10 mg/dL      Creatinine 0.57 mg/dL      Sodium 138 mmol/L      Potassium 4.5 mmol/L      Comment: Slight hemolysis detected by analyzer. Result may be falsely elevated.        Chloride 98 mmol/L      CO2 30.2 mmol/L      Calcium 9.3 mg/dL      Total Protein 7.5 g/dL      Albumin 4.0 g/dL      ALT (SGPT) 9 U/L      AST (SGOT) 15 U/L      Alkaline Phosphatase 80 U/L      Total Bilirubin 0.4 mg/dL      Globulin 3.5 gm/dL      A/G Ratio 1.1 g/dL      BUN/Creatinine Ratio 17.5     Anion Gap 9.8 mmol/L      eGFR 105.5 mL/min/1.73     Narrative:      GFR Normal >60  Chronic Kidney  Disease <60  Kidney Failure <15      Magnesium [[id number]]  (Normal) Collected: 02/05/24 0136    Specimen: Blood Updated: 02/05/24 0209     Magnesium 1.9 mg/dL     Phosphorus [[id number]]  (Normal) Collected: 02/05/24 0136    Specimen: Blood Updated: 02/05/24 0209     Phosphorus 4.5 mg/dL     C-reactive Protein [[id number]]  (Abnormal) Collected: 02/05/24 0136    Specimen: Blood Updated: 02/05/24 0209     C-Reactive Protein 11.43 mg/dL     High Sensitivity Troponin T [[id number]]  (Abnormal) Collected: 02/05/24 0136    Specimen: Blood Updated: 02/05/24 0349     HS Troponin T 26 ng/L     Narrative:      High Sensitive Troponin T Reference Range:  <14.0 ng/L- Negative Female for AMI  <22.0 ng/L- Negative Male for AMI  >=14 - Abnormal Female indicating possible myocardial injury.  >=22 - Abnormal Male indicating possible myocardial injury.   Clinicians would have to utilize clinical acumen, EKG, Troponin, and serial changes to determine if it is an Acute Myocardial Infarction or myocardial injury due to an underlying chronic condition.         BNP [[id number]]  (Normal) Collected: 02/05/24 0136    Specimen: Blood Updated: 02/05/24 0349     proBNP 650.2 pg/mL     Narrative:      This assay is used as an aid in the diagnosis of individuals suspected of having heart failure. It can be used as an aid in the diagnosis of acute decompensated heart failure (ADHF) in patients presenting with signs and symptoms of ADHF to the emergency department (ED). In addition, NT-proBNP of <300 pg/mL indicates ADHF is not likely.    Age Range Result Interpretation  NT-proBNP Concentration (pg/mL:      <50             Positive            >450                   Gray                 300-450                    Negative             <300    50-75           Positive            >900                  Gray                300-900                  Negative            <300      >75             Positive            >1800                  [place]                 300-1800                  Negative            <300    COVID-19, FLU A/B, RSV PCR 1 HR TAT - Swab, Nasopharynx [[id number]]  (Normal) Collected: 02/05/24 0147    Specimen: Swab from Nasopharynx Updated: 02/05/24 0242     COVID19 Not Detected     Influenza A PCR Not Detected     Influenza B PCR Not Detected     RSV, PCR Not Detected    Narrative:      Fact sheet for providers: https://www.fda.gov/media/555967/download    Fact sheet for patients: https://www.fda.gov/media/688770/download    Test performed by PCR.    Blood Gas, Arterial -With Co-Ox Panel: Yes [[id number]]  (Abnormal) Collected: 02/05/24 0159    Specimen: Arterial Blood from Arm, Right Updated: 02/05/24 0203     pH, Arterial 7.307 pH units      pCO2, Arterial 45.6 mm Hg      pO2, Arterial 56.3 mm Hg      HCO3, Arterial 22.3 mmol/L      Base Excess, Arterial -4.1 mmol/L      O2 Saturation, Arterial 84.6 %      Hemoglobin, Blood Gas 14.6 g/dL      Carboxyhemoglobin 4.7 %      Methemoglobin 0.10 %      Oxyhemoglobin 80.5 %      FHHB 14.7 %      Site Arterial: right brachial     Modality Cannula     FIO2 36 %      Flow Rate 4 lpm      PO2/FIO2 156     Shane's Test N/A     Lactate, Arterial --    Urinalysis With Microscopic If Indicated (No Culture) - Straight Cath [[id number]]  (Abnormal) Collected: 02/05/24 0420    Specimen: Urine from Straight Cath Updated: 02/05/24 0453     Color, UA Yellow     Appearance, UA Cloudy     pH, UA 5.5     Specific Gravity, UA 1.021     Glucose, UA Negative     Ketones, UA Negative     Bilirubin, UA Negative     Blood, UA Small (1+)     Protein, UA Negative     Leuk Esterase, UA Moderate (2+)     Nitrite, UA Negative     Urobilinogen, UA 0.2 E.U./dL    Urinalysis, Microscopic Only - Straight Cath [[id number]]  (Abnormal) Collected: 02/05/24 0420    Specimen: Urine from Straight Cath Updated: 02/05/24 0507     RBC, UA 3-5 /HPF      WBC, UA 6-10 /HPF      Bacteria, UA None Seen /HPF      Squamous Epithelial Cells, UA 0-2 /HPF       "Hyaline Casts, UA None Seen /LPF      Calcium Oxalate Crystals, UA Small/1+ /HPF      Methodology Manual Light Microscopy    High Sensitivity Troponin T 2Hr [[id number]]  (Abnormal) Collected: 02/05/24 0444    Specimen: Blood from Arm, Right Updated: 02/05/24 0532     HS Troponin T 52 ng/L      Troponin T Delta 26 ng/L     Narrative:      High Sensitive Troponin T Reference Range:  <14.0 ng/L- Negative Female for AMI  <22.0 ng/L- Negative Male for AMI  >=14 - Abnormal Female indicating possible myocardial injury.  >=22 - Abnormal Male indicating possible myocardial injury.   Clinicians would have to utilize clinical acumen, EKG, Troponin, and serial changes to determine if it is an Acute Myocardial Infarction or myocardial injury due to an underlying chronic condition.         Procalcitonin [[id number]]  (Abnormal) Collected: 02/05/24 0444    Specimen: Blood from Arm, Right Updated: 02/05/24 0626     Procalcitonin 16.45 ng/mL     Narrative:      As a Marker for Sepsis (Non-Neonates):    1. <0.5 ng/mL represents a low risk of severe sepsis and/or septic shock.  2. >2 ng/mL represents a high risk of severe sepsis and/or septic shock.    As a Marker for Lower Respiratory Tract Infections that require antibiotic therapy:    PCT on Admission    Antibiotic Therapy       6-12 Hrs later    >0.5                Strongly Recommended  >0.25 - <0.5        Recommended  0.1 - 0.25          Discouraged              Remeasure/reassess PCT  <0.1                Strongly Discouraged     Remeasure/reassess PCT    As 28 day mortality risk marker: \"Change in Procalcitonin Result\" (>80% or <=80%) if Day 0 (or Day 1) and Day 4 values are available. Refer to http://www.[employer]-pct-calculator.com    Change in PCT <=80%  A decrease of PCT levels below or equal to 80% defines a positive change in PCT test result representing a higher risk for 28-day all-cause mortality of patients diagnosed with severe sepsis for septic shock.    Change in PCT " >80%  A decrease of PCT levels of more than 80% defines a negative change in PCT result representing a lower risk for 28-day all-cause mortality of patients diagnosed with severe sepsis or septic shock.    This test is Prognostic not Diagnostic, if elevated correlate with clinical findings before administering antibiotic treatment.                 Imaging:    CT Abdomen Pelvis Without Contrast    Result Date: 2/5/2024  PROCEDURE: CT ABDOMEN PELVIS WO CONTRAST  COMPARISONS: 9/5/2023; 5/26/2023.  INDICATIONS: Abdominal pain, not otherwise specified.  TECHNIQUE:  744 CT images were created without intravenous or oral contrast agent administration.  Despite best efforts, poor image quality limits interpretation of the study, especially related to motion artifact, the patient's upper extremities in the scan field of view, and other external densities in the scan field of view.  This study was not tailored to evaluate the patient's upper extremities.  Also, there is a suspected intrathecal pain pump delivery system in place with associated streak artifact, especially related to the pump reservoir, which is implanted in the right posterior abdominal wall.  PROTOCOL:   Standard imaging protocol performed.    RADIATION:   Total DLP: 845.2 mGy*cm.   Automated exposure control was utilized to minimize radiation dose.  FINDINGS:  Again, motion artifact obscures detail on the exam.  Bibasilar infiltrates are possible.  These findings may represent pneumonia, including aspiration pneumonia.  Pulmonary edema is possible but thought to be less likely.  Atelectasis alone is thought to be least likely.  Diffuse hepatic steatosis is noted with borderline hepatomegaly.  The spleen measures 13.3 cm in maximum craniocaudal diameter.  Borderline splenomegaly is possible.  Degenerative changes involve the imaged spine and the bilateral hip joints.  There is chronic calcified granulomatous disease of the chest and abdomen.  No  hydronephrosis or obstructive uropathy or definite ureteral calculus.  No definite urinary bladder calculi.  No definite acute pancreatitis.  The patient has undergone cholecystectomy and hysterectomy.  The appendix is seen on image 155 of series 202 and adjacent images and is without acute abnormality.  Atherosclerotic changes are seen without aneurysmal dilatation the aortoiliac arterial system.  No other acute findings are suggested.        Possible bibasilar pneumonia, including aspiration pneumonia.  No other definite acute findings are seen.  Again, the study is very limited.  Please see above comments for further detail.    Please note that portions of this note were completed with a voice recognition program.  JEN JONES JR, MD       Electronically Signed and Approved By: JEN JONES JR, MD on 2/05/2024 at 4:37             XR Chest 1 View    Result Date: 2/5/2024  PROCEDURE: XR CHEST 1 VW  COMPARISON: 9/5/2023 (chest CT); 5/28/2023 (CXR).  INDICATIONS: Possible sepsis.  FINDINGS: A single AP upright portable view of the chest is provided for review.  Bilateral infiltrates are seen.  The findings may represent pulmonary edema; infectious multifocal pneumonia cannot be excluded but is thought to be less likely.  No cardiomediastinal enlargement.  No pneumothorax.  No pneumomediastinum.  No pleural effusion.  External artifacts obscure detail.  The patient has undergone transcatheter aortic valve replacement (TAVR).  There is a left IJ central venous line in place with its distal tip in the expected lower superior cava (SVC), seen previously.  The nasoenteral feeding tube has been removed. The thoracic aorta is atherosclerotic. Chronic calcified granulomatous disease involves the chest.        Decreased bilateral infiltrates are seen since the prior chest radiographic exam of 5/28/2023.  The remaining infiltrates may represent mild pulmonary edema with vascular congestion.  Infectious multifocal  pneumonia cannot be excluded but is thought to be less likely.      Please note that portions of this note were completed with a voice recognition program.  JEN JONES JR, MD       Electronically Signed and Approved By: JEN JONES JR, MD on 2/05/2024 at 3:26                 Differential Diagnosis and Discussion:    Abdominal Pain: Based on the patient's signs and symptoms, I considered abdominal aortic aneurysm, small bowel obstruction, pancreatitis, acute cholecystitis, acute appendecitis, peptic ulcer disease, gastritis, colitis, endocrine disorders, irritable bowel syndrome and other differential diagnosis an etiology of the patient's abdominal pain.  Dyspnea: Differential diagnosis includes but is not limited to metabolic acidosis, neurological disorders, psychogenic, asthma, pneumothorax, upper airway obstruction, COPD, pneumonia, noncardiogenic pulmonary edema, interstitial lung disease, anemia, congestive heart failure, and pulmonary embolism  Fever: Based on the complaint of fever, differential diagnosis includes but is not limited to meningitis, pneumonia, pyelonephritis, acute uti,  systemic immune response syndrome, sepsis, viral syndrome, fungal infection, tick born illness and other bacterial infections.    All labs were reviewed and interpreted by me.  All X-rays impressions were independently interpreted by me.  EKG was interpreted by me.  CT scan radiology impression was interpreted by me.    MDM  Number of Diagnoses or Management Options  Diagnosis management comments: On arrival patient is febrile with a temp of 102.4.  She is hypotensive.  She was given IV fluids.  She was given dose of antibiotics.  Fortunately patient has multiple drug allergies.  On arrival she was also hypoxic.  She was placed in supplemental oxygen.  Initial troponin elevated at 26.  CRP also elevated.  Patient reported abdominal pain.  CT was obtained that showed pneumonia.  Patient continued to be hypotensive and  was started on Levophed.  Discussed patient with hospitalist and will admit for further care.       Amount and/or Complexity of Data Reviewed  Clinical lab tests: reviewed  Tests in the radiology section of CPT®: reviewed  Review and summarize past medical records: yes  Independent visualization of images, tracings, or specimens: yes    Risk of Complications, Morbidity, and/or Mortality  Presenting problems: moderate  Management options: moderate         Critical Care Note: Total Critical Care time of 40 minutes. Total critical care time documented does not include time spent on separately billed procedures for services of nurses or physician assistants. I personally saw and examined the patient. I have reviewed all diagnostic interpretations and treatment plans as written. I was present for the key portions of any procedures performed and the inclusive time noted in any critical care statement. Critical care time includes patient management by me, time spent at the patients bedside,  time to review lab and imaging results, discussing patient care, documentation in the medical record, and time spent with family or caregiver.    Sepsis criteria was met in the emergency department and the Sepsis protocol (including antibiotic administration) was initiated.      SIRS criteria considered:   1.  Temperature > 100.4 or <98.6    2.  Heart Rate > 90    3.  Respiratory Rate > 22    4.  WBC > 12K or <4K.             Severe Sepsis:     Respiratory: Mechanical Ventilation or Bipap  Hypotension: SBP > 90 or MAP < 65  Renal: Creatinine > 2  Metabolic: Lactic Acid > 2  Hematologic: Platelets < 100K or INR > 1.5  Hepatic: BILI  >  2  CNS: Sudden AMS     Septic Shock:     Severe Sepsis + Persistent hypotension or Lactic Acid > 4     Normal saline bolus, Antibiotics, and final disposition was based on these definitions.        Sepsis was recognized at 0140    Antibiotics were ordered.     30 cc/kg bolus was  indicated.       Patient  Care Considerations:    CT HEAD: I considered ordering a noncontrast CT of the head, however no focal deficits      Consultants/Shared Management Plan:    Hospitalist: I have discussed the case with Dr odell who agrees to accept the patient for admission.    Social Determinants of Health:    Patient has presented with family members who are responsible, reliable and will ensure follow up care.      Disposition and Care Coordination:    Admit:   Through independent evaluation of the patient's history, physical, and imperical data, the patient meets criteria for inpatient admission to the hospital.        Final diagnoses:   Sepsis, due to unspecified organism, unspecified whether acute organ dysfunction present   Pneumonia due to infectious organism, unspecified laterality, unspecified part of lung   Acute respiratory failure with hypoxia and hypercapnia        ED Disposition       ED Disposition   Decision to Admit    Condition   --    Comment   Level of Care: Critical Care [6]   Diagnosis: Sepsis [[id number]]   Admitting Physician: SULLY ODELL [[id number]]   Certification: I Certify That Inpatient Hospital Services Are Medically Necessary For Greater Than 2 Midnights                 This medical record created using voice recognition software.             Bethanie Allred MD  02/05/24 0712      Electronically signed by Bethanie Allred MD at 02/05/24 0712       Blair Wakefield RN at 02/05/24 0610          Spoke with patient's daughter, Sheila - updated on patients plan of care.     New phone number [phone].     Electronically signed by Blair Wakefield, RN at 02/05/24 0611       Vital Signs (last day)       Date/Time Temp Temp src Pulse Resp BP Patient Position SpO2    02/05/24 0806 97.9 (36.6) Oral -- -- -- -- --    02/05/24 0803 -- -- 106 -- 99/64 -- --    02/05/24 0657 97.8 (36.6) Oral -- 23 71/50 Lying --    02/05/24 0600 -- -- 100 18 73/43 Sitting 99    02/05/24 05:55:23 -- -- 99 -- 71/47 -- 99    02/05/24  05:48:47 98.5 (36.9) Oral 99 -- 71/47 -- 99    02/05/24 0540 -- -- 105 -- 75/50 -- 99    02/05/24 05:34:07 98.1 (36.7) Oral 104 17 -- Sitting 98    02/05/24 0500 -- -- 113 -- 78/57 -- --    02/05/24 0445 -- -- 117 -- 96/57 -- 97    02/05/24 0430 -- -- 111 -- 96/57 -- 97    02/05/24 0415 98.5 (36.9) Oral 115 16 79/50 Lying 95    02/05/24 03:56:34 98.6 (37) Oral -- -- -- -- --    02/05/24 0345 -- -- 121 16 102/54 -- 93    02/05/24 03:33:45 -- -- 140 26 97/51 Sitting 94    02/05/24 0215 -- -- 135 -- 127/80 -- 98    02/05/24 0049 102.4 (39.1) Rectal 120 23 84/60 -- 76          Prior to Admission Medications       Prescriptions Last Dose Informant Patient Reported? Taking?    albuterol sulfate  (90 Base) MCG/ACT inhaler Unknown  No No    Inhale 2 puffs Every 4 (Four) Hours As Needed for Wheezing.    ALPRAZolam (Xanax) 0.5 MG tablet Unknown  No No    Take 1 tablet by mouth 2 (Two) Times a Day As Needed for Anxiety for up to 60 days.    bacitracin 500 UNIT/GM ointment Unknown  No No    Apply 1 Application topically to the appropriate area as directed 2 (Two) Times a Day.    cetirizine (zyrTEC) 10 MG tablet Unknown  Yes No    Take 1 tablet by mouth Daily.    clopidogrel (PLAVIX) 75 MG tablet Unknown  Yes No    Take 1 tablet by mouth Daily.    furosemide (LASIX) 40 MG tablet Unknown  Yes No    Take 1 tablet by mouth Daily.    gabapentin (NEURONTIN) 300 MG capsule Unknown  Yes No    Take 1 capsule by mouth 4 (Four) Times a Day.    HYDROcodone-acetaminophen (NORCO) 5-325 MG per tablet Unknown  Yes No    Take 1 tablet by mouth Daily As Needed for Moderate Pain.    hydroxychloroquine (PLAQUENIL) 200 MG tablet Unknown  Yes No    Take 1 tablet by mouth Daily.    mupirocin (BACTROBAN) 2 % ointment Unknown  No No    Apply 1 application  topically to the appropriate area as directed 3 (Three) Times a Day.    ondansetron (Zofran) 4 MG tablet Unknown  No No    Take 1 tablet by mouth Every 12 (Twelve) Hours As Needed for Nausea  or Vomiting.    pain patient supplied pump   Yes No    by Intrathecal route Continuous.   Type of Medication: Hydromorphone 15 mg/ml  and Bupivacaine 0.5 mg/ml  Provider:Dr. Boudreaux  Provider number: [phone]  Basal Dose: Hydromorphone 7.518 mg/day Bupivacaine 0.25526 mg/day  ( MAX of Hydromorphone 9.940 mg/ day; Bupivacaine 0.52023 mg /day)  Bolus Dose:Hydromorphone 0.500 mg, Bupivacaine 0.17747 mg  Lockout 3 hours. 1 Bolus per every 3 hours max of 5 bolus per day  Next refill/ Alarm Date- 03/25/2024  Pump manufacture:[employer]    predniSONE (DELTASONE) 5 MG tablet Unknown  No No    Take 1 tablet by mouth Daily for 30 days.          Orders (last 24 hrs)        Start     Ordered    02/06/24 0900  levoFLOXacin (LEVAQUIN) tablet 750 mg  Every 24 Hours         02/05/24 0831    02/06/24 0600  CBC (No Diff)  Morning Draw         02/05/24 0829    02/06/24 0600  Basic Metabolic Panel  Morning Draw         02/05/24 0829    02/06/24 0600  Magnesium  Morning Draw         02/05/24 0829    02/06/24 0600  Phosphorus  Morning Draw         02/05/24 0829    02/05/24 1400  heparin (porcine) 5000 UNIT/ML injection 5,000 Units  Every 8 Hours Scheduled         02/05/24 0829    02/05/24 0945  magnesium sulfate 2g/50 mL (PREMIX) infusion  Once         02/05/24 0852    02/05/24 0941  Inpatient Case Management  Consult  Once        Provider:  (Not yet assigned)    02/05/24 0941    02/05/24 0930  arformoterol (BROVANA) nebulizer solution 15 mcg  2 Times Daily - RT         02/05/24 0826    02/05/24 0930  budesonide (PULMICORT) nebulizer solution 0.5 mg  2 Times Daily - RT         02/05/24 0826    02/05/24 0930  Blood Gas, Arterial -With Co-Ox Panel: Yes  Timed         02/05/24 0830    02/05/24 0930  clopidogrel (PLAVIX) tablet 75 mg  Daily         02/05/24 0831    02/05/24 0928  NPO Diet NPO Type: Strict NPO  Diet Effective Now         02/05/24 0927 02/05/24 0915  ipratropium-albuterol (DUO-NEB) nebulizer solution 3 mL   Every 4 Hours - RT         02/05/24 0826    02/05/24 0900  sodium chloride 0.9 % flush 10 mL  Every 12 Hours Scheduled         02/05/24 0601    02/05/24 0900  sennosides-docusate (PERICOLACE) 8.6-50 MG per tablet 2 tablet  2 Times Daily        See Hyperspace for full Linked Orders Report.    02/05/24 0601    02/05/24 0837  SLP Consult: Eval & Treat Other; patient had recent G-tube, evaluating swallow  Once        Comments: Patient had trouble swallowing pills in ER. Patient should be evaluated    02/05/24 0838    02/05/24 0827  RT to Initiate Bronchopulmonary Hygiene Protocol  Once         02/05/24 0826    02/05/24 0826  Respiratory Panel PCR w/COVID-19(SARS-CoV-2) TANIKA/MARY/MINNIE/PAD/COR/ELLEN In-House, NP Swab in UTM/VTM, 2 HR TAT - Swab, Nasopharynx  Once         02/05/24 0825    02/05/24 0826  Lactic Acid, Plasma  STAT,   Status:  Canceled         02/05/24 0825    02/05/24 0822  Urine Drug Screen - Urine, Clean Catch  Once         02/05/24 0821    02/05/24 0807  Scan Slide  Once         02/05/24 0806    02/05/24 0800  Vital Signs  Every 4 Hours       02/05/24 0601    02/05/24 0800  Oral Care  2 Times Daily       02/05/24 0601    02/05/24 0742  Respiratory Culture - Sputum, Cough  Once         02/05/24 0741    02/05/24 0742  Legionella Antigen, Urine - Urine, Urine, Clean Catch  Once         02/05/24 0741    02/05/24 0742  S. Pneumo Ag Urine or CSF - Urine, Urine, Clean Catch  Once         02/05/24 0741    02/05/24 0742  MRSA Screen, PCR (Inpatient) - Swab, Nares  Once         02/05/24 0741    02/05/24 0738  I attest that I reassessed tissue perfusion after fluid resuscitation was completed  Once        Comments: I attest that I reassessed tissue perfusion after fluid resuscitation was completed    02/05/24 0738    02/05/24 0736  Lactic Acid, Plasma  Once,   Status:  Canceled         02/05/24 0601    02/05/24 0723  NIPPV (CPAP or BIPAP)  Until Discontinued         02/05/24 0722    02/05/24 0723  Inpatient  Gastroenterology Consult  Once        Specialty:  Gastroenterology  Provider:  Keven Razo MD    02/05/24 0722    02/05/24 0723  Wound Ostomy Eval & Treat  Once         02/05/24 0722    02/05/24 0645  sodium chloride 0.9 % bolus 500 mL  Once         02/05/24 0626    02/05/24 0630  sodium chloride 0.9 % infusion  Continuous         02/05/24 0601    02/05/24 0630  norepinephrine (LEVOPHED) 8 mg in 250 mL NS infusion (premix)  Titrated         02/05/24 0601    02/05/24 0603  OT Consult: Eval & Treat  Once         02/05/24 0603    02/05/24 0602  Procalcitonin  STAT         02/05/24 0601    02/05/24 0602  Intake & Output  Every Shift       02/05/24 0601    02/05/24 0602  Weigh Patient  Once         02/05/24 0601    02/05/24 0602  Insert Peripheral IV  Once         02/05/24 0601    02/05/24 0602  Saline Lock & Maintain IV Access  Continuous,   Status:  Canceled         02/05/24 0601    02/05/24 0602  Place Sequential Compression Device  Once         02/05/24 0601    02/05/24 0602  Maintain Sequential Compression Device  Continuous         02/05/24 0601    02/05/24 0602  Continuous Cardiac Monitoring  Continuous        Comments: Follow Standing Orders As Outlined in Process Instructions (Open Order Report to View Full Instructions)    02/05/24 0601 02/05/24 0602  Telemetry - Maintain IV Access  Continuous         02/05/24 0601    02/05/24 0602  Telemetry - Place Orders & Notify Provider of Results When Patient Experiences Acute Chest Pain, Dysrhythmia or Respiratory Distress  Until Discontinued         02/05/24 0601    02/05/24 0602  Activity - Bed Rest With Exceptions (Specify)  Until Discontinued         02/05/24 0601    02/05/24 0602  Notify Provider (With Default Parameters)  Until Discontinued         02/05/24 0601    02/05/24 0602  Diet: Regular/House Diet; Texture: Regular Texture (IDDSI 7); Fluid Consistency: Thin (IDDSI 0)  Diet Effective Now,   Status:  Canceled         02/05/24 0601    02/05/24 0602   Inpatient Pulmonology Consult  Once        Specialty:  Pulmonary Disease  Provider:  Enzo Segal MD    02/05/24 0601    02/05/24 0602  Comprehensive Metabolic Panel  Morning Draw         02/05/24 0601 02/05/24 0602  CBC Auto Differential  Morning Draw         02/05/24 0601    02/05/24 0602  Lactic Acid, Plasma  Morning Draw         02/05/24 0601    02/05/24 0602  Magnesium  Morning Draw         02/05/24 0601    02/05/24 0602  Pharmacy to Review Allergies & Recommend Antimicrobials  Once        Provider:  (Not yet assigned)    02/05/24 0601    02/05/24 0602  Wound Ostomy Eval & Treat  Once         02/05/24 0603    02/05/24 0602  PT Consult: Eval & Treat Functional Mobility Below Baseline  Once         02/05/24 0603    02/05/24 0601  polyethylene glycol (MIRALAX) packet 17 g  Daily PRN        See Hyperspace for full Linked Orders Report.    02/05/24 0601    02/05/24 0601  bisacodyl (DULCOLAX) EC tablet 5 mg  Daily PRN        See Hyperspace for full Linked Orders Report.    02/05/24 0601    02/05/24 0601  bisacodyl (DULCOLAX) suppository 10 mg  Daily PRN        See Hyperspace for full Linked Orders Report.    02/05/24 0601    02/05/24 0601  ondansetron (ZOFRAN) injection 4 mg  Every 6 Hours PRN         02/05/24 0601    02/05/24 0601  sodium chloride 0.9 % flush 10 mL  As Needed         02/05/24 0601    02/05/24 0601  sodium chloride 0.9 % infusion 40 mL  As Needed         02/05/24 0601    02/05/24 0601  nitroglycerin (NITROSTAT) SL tablet 0.4 mg  Every 5 Minutes PRN         02/05/24 0601    02/05/24 0534  Code Status and Medical Interventions:  Continuous         02/05/24 0538    02/05/24 0532  Inpatient Admission  Once         02/05/24 0538    02/05/24 0452  Inpatient Hospitalist Consult  Once        Specialty:  Hospitalist  Provider:  Jovany Bennett DO    02/05/24 0451    02/05/24 0451  Urinalysis, Microscopic Only - Straight Cath  Once         02/05/24 0450    02/05/24 0445  norepinephrine (LEVOPHED) 8 mg  Never in 250 mL NS infusion (premix)  Titrated,   Status:  Discontinued         02/05/24 0422    02/05/24 0338  ABG with Co-Ox and Electrolytes  Once,   Status:  Canceled         02/05/24 0337    02/05/24 0336  High Sensitivity Troponin T 2Hr  PROCEDURE ONCE         02/05/24 0349    02/05/24 0335  High Sensitivity Troponin T  Once         02/05/24 0334    02/05/24 0335  BNP  STAT         02/05/24 0334    02/05/24 0215  levoFLOXacin (LEVAQUIN) 750 mg/150 mL D5W (premix) (LEVAQUIN) 750 mg  Once         02/05/24 0146    02/05/24 0215  HYDROmorphone (DILAUDID) injection 0.5 mg  Once,   Status:  Discontinued         02/05/24 0146    02/05/24 0215  acetaminophen (TYLENOL) suppository 650 mg  Once         02/05/24 0158    02/05/24 0200  acetaminophen (TYLENOL) tablet 975 mg  Once         02/05/24 0142    02/05/24 0146  CT Abdomen Pelvis Without Contrast  1 Time Imaging         02/05/24 0146    02/05/24 0142  COVID-19, FLU A/B, RSV PCR 1 HR TAT - Swab, Nasopharynx  Once         02/05/24 0141    02/05/24 0123  Comprehensive Metabolic Panel  Once         02/05/24 0053    02/05/24 0123  Magnesium  Once         02/05/24 0053    02/05/24 0123  Phosphorus  Once         02/05/24 0053    02/05/24 0123  C-reactive Protein  Once         02/05/24 0053    02/05/24 0123  Green Top (Gel)  PROCEDURE ONCE         02/05/24 0054    02/05/24 0115  sodium chloride 0.9 % bolus 1,000 mL  Once         02/05/24 0051    02/05/24 0058  Occult Blood, Fecal By Immunoassay - Stool, Per Rectum  Once         02/05/24 0057    02/05/24 0055  VBG with Co-Ox and Electrolytes  Once         02/05/24 0054    02/05/24 0054  Undress & Gown  Once         02/05/24 0053    02/05/24 0054  Cardiac Monitoring  Continuous,   Status:  Canceled        Comments: Follow Standing Orders As Outlined in Process Instructions (Open Order Report to View Full Instructions)    02/05/24 0053    02/05/24 0054  Continuous Pulse Oximetry  Continuous         02/05/24 0053    02/05/24 0054   Measure Blood Pressure  Every 30 Minutes        Comments: Increase frequency as patient condition dictates and/or with use of vasoactive treatments    02/05/24 0053    02/05/24 0054  Vital Signs  Every 30 Minutes        Comments: Increase frequency as patient condition dictates    02/05/24 0053    02/05/24 0054  ECG 12 Lead Other; Sepsis  Once         02/05/24 0053    02/05/24 0054  XR Chest 1 View  1 Time Imaging         02/05/24 0053    02/05/24 0054  Insert Large Bore Peripheral IV  Once         02/05/24 0053    02/05/24 0054  Insert 2nd Large Bore Peripheral IV  Once         02/05/24 0053    02/05/24 0054  [place] Draw  Once         02/05/24 0053    02/05/24 0054  CBC & Differential  Once         02/05/24 0053    02/05/24 0054  Protime-INR  Once         02/05/24 0053    02/05/24 0054  aPTT  Once         02/05/24 0053    02/05/24 0054  Lactic Acid, Plasma  Once         02/05/24 0053    02/05/24 0054  Blood Culture - Blood, Arm, Left  Once         02/05/24 0053    02/05/24 0054  Blood Culture - Blood, Arm, Right  Once         02/05/24 0053    02/05/24 0054  Blood Gas, Arterial -With Co-Ox Panel: Yes  Once         02/05/24 0053    02/05/24 0054  Urinalysis With Microscopic If Indicated (No Culture) - Straight Cath  Once         02/05/24 0053    02/05/24 0054  Lavender Top  PROCEDURE ONCE         02/05/24 0054    02/05/24 0054  Gold Top - SST  PROCEDURE ONCE         02/05/24 0054    02/05/24 0054  Light Blue Top  PROCEDURE ONCE         02/05/24 0054    02/05/24 0054  CBC Auto Differential  PROCEDURE ONCE         02/05/24 0054    02/05/24 0053  sodium chloride 0.9 % flush 10 mL  As Needed         02/05/24 0053    02/05/24 0050  sodium chloride 0.9 % infusion  - ADS Override Pull        Note to Pharmacy: Created by cabinet override    02/05/24 0050    Unscheduled  Oxygen Therapy- Nasal Cannula; Titrate 1-6 LPM Per SpO2; 90 - 95%  Continuous PRN       02/05/24 0053    Unscheduled  Oxygen Therapy- Nasal Cannula; Titrate  1-6 LPM Per SpO2; 90 - 95%  Continuous PRN       02/05/24 0601    Unscheduled  Wound Care  As Needed       02/05/24 0825    --  HYDROcodone-acetaminophen (NORCO) 5-325 MG per tablet  Daily PRN         02/05/24 0652    --  hydroxychloroquine (PLAQUENIL) 200 MG tablet  Daily         02/05/24 0652    --  gabapentin (NEURONTIN) 300 MG capsule  4 Times Daily         02/05/24 0654    --  furosemide (LASIX) 40 MG tablet  Daily         02/05/24 0656                  Physician Progress Notes (last 24 hours)  Notes from 02/04/24 1000 through 02/05/24 1000   No notes of this type exist for this encounter.       Consult Notes (last 24 hours)  Notes from 02/04/24 1000 through 02/05/24 1000   No notes of this type exist for this encounter.       Respiratory Therapy Notes (last 24 hours)  Notes from 02/04/24 1000 through 02/05/24 1000   No notes exist for this encounter.

## 2024-02-05 NOTE — H&P
[facility]   HISTORY AND PHYSICAL    Patient Name: Isha Llanes  : 1965  MRN: [id number]  Primary Care Physician:  Katia Wright MD  Date of admission: 2024    Subjective   Subjective     Chief Complaint:   Altered mental status  History of Present Illness  Patient is a 58-year-old female with past medical history Hodgkin's lymphoma with her last chemo being 6 weeks ago, CHF, COPD, CAD, diabetes mellitus, hypertension, lupus, PTSD aortic stenosis status post valve replacement.  Patient presented to the emergency department because of altered mental status, abdominal pain and respiratory distress.  According to report patient has not been doing well for several days.  She has nobody with her and was unable to provide much of the history.  She says she is hurting all over and asking for pain medication.  Patient has an implanted Dilaudid pump.  Here in the ED her initial vital signs were poor with low oxygen, low blood pressure, and the patient was very altered.  She received 3 L of fluid however her blood pressure has remained low.  Her workup is found a respiratory acidosis with a pCO2 of 45 PaO2 of 56.3 and a pH of 7.3.  Her white blood cell count was essentially normal although she did have a positive fecal occult.  Patient is unable to say whether she is seeing any blood in her stool.  Review of Systems   Complete review of systems was difficult given patient's mental status.  She was moaning complaining of pain all over  Personal History     Past Medical History:   Diagnosis Date    Anesthesia     REPORTS HAS GOT REAL EMOTIONAL AND HAS BECOME ANGRY BEFORE    Anxiety     Aortic stenosis, severe     HAS BIO VALVE REPLACED    Arthritis     Asthma     Back pain     Blood clotting disorder     Cancer     Cancer associated pain     HODGKINS LYMPHOMA    CHF (congestive heart failure)     Chronic low back pain with sciatica     Chronic pain disorder     COPD (chronic obstructive pulmonary disease)      Coronary artery disease     Diabetes mellitus     TYPE 2    Diabetes mellitus     Dyspnea on effort     GERD (gastroesophageal reflux disease)     H/O lumpectomy     H/O: hysterectomy     Hiatal hernia     History of degenerative disc disease     History of kidney stones     History of pulmonary embolus (PE)     History of transfusion     AS A CHILD NO TRANSFUSION REACTION    History of transfusion     Hodgkin's lymphoma     5/19/23  5 YEARS SINCE LAST CHEMO TX    Hypertension     Immobility     Liver disease     DENIES ANY CURRENT ISSUES    Lupus     Mitral valve prolapse     Panic disorder     Pelvic pain     Peripheral neuropathy     Personal history of DVT (deep vein thrombosis)     Presence of intrathecal pump     PTSD (post-traumatic stress disorder)     Sacroiliac joint disease     SI (sacroiliac) joint inflammation     Sleep apnea     Venous insufficiency        Past Surgical History:   Procedure Laterality Date    ABDOMINAL SURGERY      BACK SURGERY      SPINAL FUSION     BACK SURGERY      BLADDER REPAIR      CARDIAC CATHETERIZATION N/A 03/06/2019    Procedure: Valvuloplasty;  Surgeon: Wayne Johnson MD;  Location: [facility] INVASIVE LOCATION;  Service: Cardiology    CARDIAC CATHETERIZATION      CARDIAC CATHETERIZATION Left 5/31/2023    Procedure: Cardiac Catheterization/Vascular Study;  Surgeon: Poncho Reid MD;  Location: [facility] CATH INVASIVE LOCATION;  Service: Cardiovascular;  Laterality: Left;    CARDIAC SURGERY      CARDIAC VALVE REPLACEMENT  2021    CHOLECYSTECTOMY      COLONOSCOPY N/A 12/29/2021    Procedure: COLONOSCOPY;  Surgeon: Karine Orlando MD;  Location: [facility] ENDOSCOPY;  Service: Gastroenterology;  Laterality: N/A;  POOR PREP    COLONOSCOPY N/A 04/13/2022    Procedure: COLONOSCOPY WITH POLYPECTOMY;  Surgeon: Karine Orlando MD;  Location: [facility] ENDOSCOPY;  Service: Gastroenterology;  Laterality: N/A;  COLON POLYP, HEMORRHOIDS, POOR PREP     COLONOSCOPY      DIRECT LARYNGOSCOPY, ESOPHAGOSCOPY, BRONCHOSCOPY N/A 5/22/2023    Procedure: DIRECT LARYNGOSCOPY WITH BIOPSIES , ESOPHAGOSCOPY, BRONCHOSCOPY;  Surgeon: Ronnie Mcgarry MD;  Location: [facility];  Service: ENT;  Laterality: N/A;    ENDOSCOPY N/A 12/29/2021    Procedure: ESOPHAGOGASTRODUODENOSCOPY;  Surgeon: Karine Orlando MD;  Location: [facility] ENDOSCOPY;  Service: Gastroenterology;  Laterality: N/A;  ESOPHAGITIS, GASTRITIS, HIATAL HERNIA    ENDOSCOPY      HYSTERECTOMY      LYMPHADENECTOMY      PAIN PUMP INSERTION/REVISION N/A 10/18/2019    Procedure: PAIN PUMP INSERTION [facility] 10-18-19 [place];  Surgeon: Horace Rios MD;  Location: [facility] OR;  Service: Pain Management    PAIN PUMP INSERTION/REVISION N/A 11/23/2020    Procedure: pain pump removal;  Surgeon: Horace Rios MD;  Location: [facility] OR;  Service: Pain Management;  Laterality: N/A;    PEG TUBE INSERTION N/A 7/26/2023    Procedure: PERCUTANEOUS ENDOSCOPIC GASTROSTOMY TUBE INSERTION;  Surgeon: Kody Mercer MD;  Location: [facility] ENDOSCOPY;  Service: General;  Laterality: N/A;  SUCCESSFUL PEG TUBE PLACE PLACEMENT    PORTACATH PLACEMENT      IN LEFT CHEST REPORTS THAT IT IS NOT FUNCTIONING    SKIN BIOPSY      TONSILLECTOMY      TUBAL ABDOMINAL LIGATION      TUMOR REMOVAL         Family History: family history includes ADD / ADHD in her brother, granddaughter, grandson, and nephew; Anxiety disorder in her mother; Bipolar disorder in her sister; Depression in her sister; Heart failure in her mother; Pancreatic cancer in her paternal grandmother and sister; Prostate cancer in her father; Thyroid cancer in her maternal grandmother. Otherwise pertinent FHx was reviewed and not pertinent to current issue.    Social History:  reports that she has been smoking cigarettes. She started smoking about 45 years ago. She has a 30.00 pack-year smoking history. She has never used smokeless tobacco. She reports that she  "does not drink alcohol and does not use drugs.    Home Medications:  ACE Elastic Bandage 4\", ALPRAZolam, Acetaminophen Extra Strength, HYDROcodone-acetaminophen, HYDROmorphone, HYDROmorphone HCl, Insulin Lispro (1 Unit Dial), Insulin Pen Needle, Insulin Syringe, Lancets, SITagliptin, albuterol sulfate HFA, azithromycin, bacitracin, cetirizine, clopidogrel, furosemide, gabapentin, glucose blood, glucose monitor, hydroxychloroquine, insulin detemir, loperamide, mupirocin, ondansetron, pain, and predniSONE    Allergies:  Allergies   Allergen Reactions    Amoxicillin Shortness Of Breath    Ceclor [Cefaclor] Shortness Of Breath    Penicillins Shortness Of Breath    Sulfa Antibiotics Rash    Valium [Diazepam] Mental Status Change     DEPRESSED    Ambien [Zolpidem] Mental Status Change    Aspirin GI Intolerance    Ativan [Lorazepam] Mental Status Change    Benadryl [Diphenhydramine] Anxiety     AND MAKES HER HYPER    Biaxin [Clarithromycin] Unknown - Low Severity    Cefazolin Unknown - Low Severity    Cephalexin Unknown - Low Severity    Cephalosporins Unknown - Low Severity    Clindamycin Unknown - Low Severity    Compazine [Prochlorperazine Edisylate] Rash    Contrast Dye (Echo Or Unknown Ct/Mr) Other (See Comments)     Caused pain in her arm    Doxycycline Rash    Eggs Or Egg-Derived Products GI Intolerance    Nsaids GI Intolerance    Phenergan [Promethazine Hcl] GI Intolerance    Promethazine GI Intolerance    Vancomycin Itching       Objective    Objective     Vitals:   Temp:  [98.1 °F (36.7 °C)-102.4 °F (39.1 °C)] 98.1 °F (36.7 °C)  Heart Rate:  [104-140] 104  Resp:  [16-26] 17  BP: ()/(50-80) 78/57  Flow (L/min):  [5] 5    Physical Exam  Constitutional: Patient appears acutely ill and older than stated age      HENT:  Head:  Normocephalic and atraumatic.  Mouth:  Moist mucous membranes.    Eyes:  Conjunctivae and EOM are normal. No scleral icterus.    Neck:  Neck supple.  No JVD present.    Cardiovascular: " Tachycardic rate, regular rhythm and normal heart sounds with no murmur.  Pulmonary/Chest:  No respiratory distress, no wheezes, no crackles, with normal breath sounds and good air movement.  Abdominal:  Soft. No distension and no tenderness.   Musculoskeletal:  No tenderness, and no deformity.  No red or swollen joints anywhere.    Neurological: Arousable and oriented to person place.  No cranial nerve deficit.    Skin: Several ulcerations bilateral lower extremities with associated erythema warmth redness of the right lower extremity  Peripheral vascular:  No clubbing, no cyanosis, no edema.  Psychiatric: Appropriate mood and affect  : Deferred  Result Review    Result Review:  I have personally reviewed the results from the time of this admission to 2/5/2024 05:38 EST and agree with these findings:  [x]  Laboratory list / accordion  []  Microbiology  [x]  Radiology  []  EKG/Telemetry   []  Cardiology/Vascular   []  Pathology  []  Old records  []  Other:  Most notable findings include:       Assessment & Plan   Assessment / Plan     Brief Patient Summary:  Isha Llanes is a 58 y.o. female who presents to the emergency department with altered mental status, abdominal pain and shortness of breath.  She was found to have bibasilar pneumonia and is clinically septic.  Started on levo in the ED.  She will be admitted to the intensive care unit once beds available    Active Hospital Problems:  1.  Clinical sepsis  2.  Pneumonia  3.  Hypotension requiring pressors  4.  Acute hypoxic hypercapnic respiratory failure  5.  Respiratory acidosis  6.  Possible GI bleed  7.  Mild cellulitis right lower extremity  8.  Hodgkin's lymphoma    Plan:   Patient will be admitted to the hospitalist service  She will have to be placed in the intensive care unit on pressors  I have asked pharmacy to evaluate her allergy list and recommend antibiotic to continue.  Patient reported she could only take Levaquin  BiPAP to be attempted  in the ICU.  Patient refused it in the ED  Patient refused CTA due to contrast allergy, patient has 22 drug allergies ported  Consult GI for possible upper GI bleed  Start Protonix  Consult wound care for the wounds on her lower extremities  The patient will be kept on telemetry for closer monitoring      DVT prophylaxis:  Mechanical DVT prophylaxis orders are signed and held.          CODE STATUS:    Code Status (Patient has no pulse and is not breathing): CPR (Attempt to Resuscitate)  Medical Interventions (Patient has pulse or is breathing): Full Support    Admission Status:  I believe this patient meets inpatient status.    Jovany Bennett, DO

## 2024-02-05 NOTE — PROGRESS NOTES
Respiratory Therapist Broncho-Pulmonary Hygiene Progress Note      Patient Name:  Isha Llanes  YOB: 1965    Isha Llanes meets the qualification for Level 2 of the Bronco-Pulmonary Hygiene Protocol. This was based on my daily patient assessment and includes review of chest x-ray results, cough ability and quality, oxygenation, secretions or risk for secretion development and patient mobility.     Broncho-Pulmonary Hygiene Assessment:    Level of Movement: Actively changing positions without assistance  Alert/ oriented/ cooperative    Breath Sounds: Clear to slightly diminished    Cough: Strong, effective    Chest X-Ray: Mild consolidation and/or atelectasis.    Sputum Productions: None or small amount of thin or watery secretions with effective cough    History and Physical: Chronic condition    SpO2 to Oxygen Need: greater than 90% on  greater than 6L, Vapotherm, oxygen mask greater than 40% or ventilator    Current SpO2 is: 99% on 30%    Based on this information, I have completed the following interventions: Aerobika with bronchodialtor medication or TID      Electronically signed by Tracey Aleman RRT, 02/05/24, 1:53 PM EST.

## 2024-02-05 NOTE — CONSULTS
Pulmonary / Critical Care Consult Note      Patient Name: Isha Llanes  : 1965  MRN: [id number]  Primary Care Physician:  Katia Wright MD  Referring Physician: Virgil Navarro MD  Date of admission: 2024    Subjective   Subjective     Reason for Consult/ Chief Complaint: AMS    HPI:  Isha Llanes is a 58 y.o. 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  Patient was febrile with temperature of 102.4, she was hypotensive systolic in the 80s, and hypoxic with O2 saturation in the 70s.  Laboratory findings showed WBC 8.5, CRP 11.4, creatinine 0.57, sodium 138, CO2 30, Pro-Ge 16, lactic acid 1.1, ABG pH 7.30/45/56/22.  CT scan of the abdomen pelvis showed possible bibasilar pneumonia.  In the ED she was given IV fluids, initiated on Levaquin, and given Tylenol for fever.  Patient remained hypotensive requiring Levophed infusion.  Our service was consulted for critical care management and she was admitted to the ICU.    On exam this morning patient is on 5 L nasal cannula, Levophed at 0.1 mcg/kg/min, she is mildly dyspneic, does have coarse congested cough.  Endorses productive greenish phlegm.  Asking for Xanax.     Review of Systems  Constitutional symptoms:  Denied complaints   Cardiovascular:  Denied complaints  Respiratory: Denied complaints  Gastrointestinal: Denied complaints  Musculoskeletal: Denied complaints  Genitourinary: Denied complaints  Neurologic: Denied complaints    Personal History     Past Medical History:   Diagnosis Date   • Anesthesia     REPORTS HAS GOT REAL EMOTIONAL AND HAS BECOME ANGRY BEFORE   • Anxiety    • Aortic stenosis, severe     HAS BIO VALVE REPLACED   • Arthritis    • Asthma    • Back pain    • Blood clotting disorder    • Cancer    • Cancer associated pain     HODGKINS LYMPHOMA   • CHF  (congestive heart failure)    • Chronic low back pain with sciatica    • Chronic pain disorder    • COPD (chronic obstructive pulmonary disease)    • Coronary artery disease    • Diabetes mellitus     TYPE 2   • Diabetes mellitus    • Dyspnea on effort    • GERD (gastroesophageal reflux disease)    • H/O lumpectomy    • H/O: hysterectomy    • Hiatal hernia    • History of degenerative disc disease    • History of kidney stones    • History of pulmonary embolus (PE)    • History of transfusion     AS A CHILD NO TRANSFUSION REACTION   • History of transfusion    • Hodgkin's lymphoma     5/19/23  5 YEARS SINCE LAST CHEMO TX   • Hypertension    • Immobility    • Liver disease     DENIES ANY CURRENT ISSUES   • Lupus    • Mitral valve prolapse    • Panic disorder    • Pelvic pain    • Peripheral neuropathy    • Personal history of DVT (deep vein thrombosis)    • Presence of intrathecal pump    • PTSD (post-traumatic stress disorder)    • Sacroiliac joint disease    • SI (sacroiliac) joint inflammation    • Sleep apnea    • Venous insufficiency        Past Surgical History:   Procedure Laterality Date   • ABDOMINAL SURGERY     • BACK SURGERY      SPINAL FUSION    • BACK SURGERY     • BLADDER REPAIR     • CARDIAC CATHETERIZATION N/A 03/06/2019    Procedure: Valvuloplasty;  Surgeon: Wayne Johnson MD;  Location: [facility] INVASIVE LOCATION;  Service: Cardiology   • CARDIAC CATHETERIZATION     • CARDIAC CATHETERIZATION Left 5/31/2023    Procedure: Cardiac Catheterization/Vascular Study;  Surgeon: Poncho Reid MD;  Location: [facility] CATH INVASIVE LOCATION;  Service: Cardiovascular;  Laterality: Left;   • CARDIAC SURGERY     • CARDIAC VALVE REPLACEMENT  2021   • CHOLECYSTECTOMY     • COLONOSCOPY N/A 12/29/2021    Procedure: COLONOSCOPY;  Surgeon: Karine Orlando MD;  Location: [facility] ENDOSCOPY;  Service: Gastroenterology;  Laterality: N/A;  POOR PREP   • COLONOSCOPY N/A 04/13/2022    Procedure:  COLONOSCOPY WITH POLYPECTOMY;  Surgeon: Karine Orlando MD;  Location: [facility] ENDOSCOPY;  Service: Gastroenterology;  Laterality: N/A;  COLON POLYP, HEMORRHOIDS, POOR PREP   • COLONOSCOPY     • DIRECT LARYNGOSCOPY, ESOPHAGOSCOPY, BRONCHOSCOPY N/A 5/22/2023    Procedure: DIRECT LARYNGOSCOPY WITH BIOPSIES , ESOPHAGOSCOPY, BRONCHOSCOPY;  Surgeon: Ronnie Mcgarry MD;  Location: [facility] OR OSC;  Service: ENT;  Laterality: N/A;   • ENDOSCOPY N/A 12/29/2021    Procedure: ESOPHAGOGASTRODUODENOSCOPY;  Surgeon: Karine Orlando MD;  Location: [facility] ENDOSCOPY;  Service: Gastroenterology;  Laterality: N/A;  ESOPHAGITIS, GASTRITIS, HIATAL HERNIA   • ENDOSCOPY     • HYSTERECTOMY     • LYMPHADENECTOMY     • PAIN PUMP INSERTION/REVISION N/A 10/18/2019    Procedure: PAIN PUMP INSERTION [facility] 10-18-19 [place];  Surgeon: Horace Rios MD;  Location: [facility] OR;  Service: Pain Management   • PAIN PUMP INSERTION/REVISION N/A 11/23/2020    Procedure: pain pump removal;  Surgeon: Horace Rios MD;  Location: [facility] OR;  Service: Pain Management;  Laterality: N/A;   • PEG TUBE INSERTION N/A 7/26/2023    Procedure: PERCUTANEOUS ENDOSCOPIC GASTROSTOMY TUBE INSERTION;  Surgeon: Kody Mercer MD;  Location: [facility] ENDOSCOPY;  Service: General;  Laterality: N/A;  SUCCESSFUL PEG TUBE PLACE PLACEMENT   • PORTACATH PLACEMENT      IN LEFT CHEST REPORTS THAT IT IS NOT FUNCTIONING   • SKIN BIOPSY     • TONSILLECTOMY     • TUBAL ABDOMINAL LIGATION     • TUMOR REMOVAL         Family History: family history includes ADD / ADHD in her brother, granddaughter, grandson, and nephew; Anxiety disorder in her mother; Bipolar disorder in her sister; Depression in her sister; Heart failure in her mother; Pancreatic cancer in her paternal grandmother and sister; Prostate cancer in her father; Thyroid cancer in her maternal grandmother. Otherwise pertinent FHx was reviewed and not pertinent to current issue.    Social  History:  reports that she has been smoking cigarettes. She started smoking about 45 years ago. She has a 30.00 pack-year smoking history. She has never used smokeless tobacco. She reports that she does not drink alcohol and does not use drugs.    Home Medications:  ALPRAZolam, HYDROcodone-acetaminophen, albuterol sulfate HFA, bacitracin, cetirizine, clopidogrel, furosemide, gabapentin, hydroxychloroquine, mupirocin, ondansetron, pain, and predniSONE    Allergies:  Allergies   Allergen Reactions   • Amoxicillin Shortness Of Breath     Has tolerated Cefazolin, Ceftriax[facility], and [facility]   • Ceclor [Cefaclor] Shortness Of Breath     Has tolerated Cefazolin, Ceftriaxone, and [facility]     • Penicillins Shortness Of Breath     Has tolerated Cefazolin, Ceftriaxone, and Cefepime -Jermaine Melida, RPH   • Sulfa Antibiotics Rash   • Valium [Diazepam] Mental Status Change     DEPRESSED   • Ambien [Zolpidem] Mental Status Change   • Aspirin GI Intolerance   • Ativan [Lorazepam] Mental Status Change   • Benadryl [Diphenhydramine] Anxiety     AND MAKES HER HYPER   • Biaxin [Clarithromycin] Unknown - Low Severity   • Cephalexin Unknown - Low Severity   • Clindamycin Unknown - Low Severity   • Compazine [Prochlorperazine Edisylate] Rash   • Contrast Dye (Echo Or Unknown Ct/Mr) Other (See Comments)     Caused pain in her arm   • Doxycycline Rash   • Eggs Or Egg-Derived Products GI Intolerance   • Nsaids GI Intolerance   • Phenergan [Promethazine Hcl] GI Intolerance   • Promethazine GI Intolerance   • Vancomycin Itching       Objective    Objective     Vitals:   Temp:  [97.8 °F (36.6 °C)-102.4 °F (39.1 °C)] 97.9 °F (36.6 °C)  Heart Rate:  [] 92  Resp:  [16-26] 22  BP: ()/(43-80) 99/64  Flow (L/min):  [5] 5    Physical Exam:  Vital Signs Reviewed   General: Chronically ill-appearing, on nasal cannula  HEENT:  PERRL, EOMI. dry oral mucosa  Neck:  Supple, no JVD, no  thyromegaly  Chest:  good aeration, coarse rhonchi to auscultation bilaterally, tympanic to percussion bilaterally, no work of breathing noted  CV: RRR, no MGR, pulses 2+, equal.  Abd:  Soft, NT, ND, + BS, no HSM  EXT:  no clubbing, no cyanosis, no edema  Neuro:  A&Ox2, CN grossly intact, no focal deficits.  Intermittently confused about the course of events  Skin: No rashes or lesions noted, chronic changes to bilateral lower extremities, hold healing wounds, see media for image    Result Review    Result Review:  I have personally reviewed the results from the time of this admission to 2/5/2024 11:20 EST and agree with these findings:  [x]  Laboratory  [x]  Microbiology  [x]  Radiology  [x]  EKG/Telemetry   [x]  Cardiology/Vascular   []  Pathology  [x]  Old records  []  Other:  Most notable findings include:       Lab 02/05/24  0758 02/05/24  0136 02/05/24  0113   WBC 12.08*  --  8.50   HEMOGLOBIN 13.3  --  12.5   HEMATOCRIT 44.6  --  39.7   PLATELETS 120*  --  150   SODIUM 139 138  --    POTASSIUM 4.0 4.5  --    CHLORIDE 105 98  --    CO2 23.0 30.2*  --    BUN 13 10  --    CREATININE 0.59 0.57  --    GLUCOSE 115* 126*  --    CALCIUM 8.4* 9.3  --    PHOSPHORUS  --  4.5  --    TOTAL PROTEIN 6.3 7.5  --    ALBUMIN 3.4* 4.0  --    GLOBULIN 2.9 3.5  --        Assessment & Plan   Assessment / Plan     Active Hospital Problems:  Active Hospital Problems    Diagnosis    • Sepsis      Impression:  Septic shock, present on admission  Pneumonia of unspecified organism  Acute hypoxic and hypercarbic respiratory failure requiring supplemental oxygen  COPD with acute exacerbation  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  History of non-Hodgkin's lymphoma  History of severe aortic stenosis s/p TAVR  History of MSSA cellulitis  Type 2 diabetes    Plan:  -Continue supplemental oxygen, okay to use Airvo if needed  -Use BiPAP 14/7 with naps and as needed  -Repeat ABG today  -Add bronchopulmonary  hygiene, bronchodilator protocol  -Add Brovana, Pulmicort and DuoNeb  -Send respiratory culture, check urine antigens, MRSA PCR  -Check respiratory viral panel  -Continue antibiotic plan with Levaquin, she has multiple allergies  -Aspiration precautions/SLP, history of radiation, with PEG tube  -Keep n.p.o. for now  -Did have GI consulted, hemoglobin stable at 13, no evidence of acute bleeding  -Lactic acid not elevated  -Continue Levophed to maintain MAP at 65  -Continue normal saline at 125  -Patient received IV fluid resuscitation per sepsis guidelines  -Previous echocardiogram 5/2023 EF 46 to 50%, TAVR,  -Consult wound care for bilateral lower extremity  -In renal function, monitor replace electrolytes as needed  -IV magnesium given today  No tubes lines or drains    DVT prophylaxis: Subcutaneous heparin  Medical and mechanical DVT prophylaxis orders are present.         Code Status and Medical Interventions:   Ordered at: 02/05/24 0538     Code Status (Patient has no pulse and is not breathing):    CPR (Attempt to Resuscitate)     Medical Interventions (Patient has pulse or is breathing):    Full Support      I, LUZ Friedman, spent 15 minutes critical care time.  Electronically signed by LUZ Ritter, 02/05/24, 11:20 AM EST.    Patient is critically ill with acute hypoxic respiratory failure requiring BiPAP, septic shock on norepinephrine. 35 minutes of critical care time was spent managing this patient, excluding procedures. Of this time, IDr. Cherelle, spent 20 minutes and Mira Andujar NP spent 15 minutes in accordance with split shared billing. This included personally reviewing all pertinent labs, imaging, microbiology and documentation. Also discussing the case with the patient and any available family, the admitting physician and any available ancillary staff.   Electronically signed by Cherelle Mitchell MD, 02/05/24, 1:40 PM EST.

## 2024-02-05 NOTE — ED PROVIDER NOTES
Time: 7:04 AM EST  Date of encounter:  2/5/2024  Independent Historian/Clinical History and Information was obtained by:   Patient    History is limited by: N/A    Chief Complaint: AMS, fever, abdominal pain      History of Present Illness:  Patient is a 58 y.o. year old female who presents to the emergency department for evaluation of Altered mental status, fever and abdominal pain.  Patient has a history of Hodgkin's lymphoma, CHF, COPD, CAD, diabetes, hypertension, lupus, aortic stenosis status posts valve repair.  She presented to the emergency department with altered mental status, abdominal pain and respiratory distress.  She says she has not been feeling well for several days.  She reports pain all over.  She reports nonproductive cough.  She reports pain in upper abdomen    HPI    Patient Care Team  Primary Care Provider: Katia Wright MD    Past Medical History:     Allergies   Allergen Reactions    Amoxicillin Shortness Of Breath    Ceclor [Cefaclor] Shortness Of Breath    Penicillins Shortness Of Breath    Sulfa Antibiotics Rash    Valium [Diazepam] Mental Status Change     DEPRESSED    Ambien [Zolpidem] Mental Status Change    Aspirin GI Intolerance    Ativan [Lorazepam] Mental Status Change    Benadryl [Diphenhydramine] Anxiety     AND MAKES HER HYPER    Biaxin [Clarithromycin] Unknown - Low Severity    Cefazolin Unknown - Low Severity    Cephalexin Unknown - Low Severity    Cephalosporins Unknown - Low Severity    Clindamycin Unknown - Low Severity    Compazine [Prochlorperazine Edisylate] Rash    Contrast Dye (Echo Or Unknown Ct/Mr) Other (See Comments)     Caused pain in her arm    Doxycycline Rash    Eggs Or Egg-Derived Products GI Intolerance    Nsaids GI Intolerance    Phenergan [Promethazine Hcl] GI Intolerance    Promethazine GI Intolerance    Vancomycin Itching     Past Medical History:   Diagnosis Date    Anesthesia     REPORTS HAS GOT REAL EMOTIONAL AND HAS BECOME ANGRY BEFORE    Anxiety      Aortic stenosis, severe     HAS BIO VALVE REPLACED    Arthritis     Asthma     Back pain     Blood clotting disorder     Cancer     Cancer associated pain     HODGKINS LYMPHOMA    CHF (congestive heart failure)     Chronic low back pain with sciatica     Chronic pain disorder     COPD (chronic obstructive pulmonary disease)     Coronary artery disease     Diabetes mellitus     TYPE 2    Diabetes mellitus     Dyspnea on effort     GERD (gastroesophageal reflux disease)     H/O lumpectomy     H/O: hysterectomy     Hiatal hernia     History of degenerative disc disease     History of kidney stones     History of pulmonary embolus (PE)     History of transfusion     AS A CHILD NO TRANSFUSION REACTION    History of transfusion     Hodgkin's lymphoma     5/19/23  5 YEARS SINCE LAST CHEMO TX    Hypertension     Immobility     Liver disease     DENIES ANY CURRENT ISSUES    Lupus     Mitral valve prolapse     Panic disorder     Pelvic pain     Peripheral neuropathy     Personal history of DVT (deep vein thrombosis)     Presence of intrathecal pump     PTSD (post-traumatic stress disorder)     Sacroiliac joint disease     SI (sacroiliac) joint inflammation     Sleep apnea     Venous insufficiency      Past Surgical History:   Procedure Laterality Date    ABDOMINAL SURGERY      BACK SURGERY      SPINAL FUSION     BACK SURGERY      BLADDER REPAIR      CARDIAC CATHETERIZATION N/A 03/06/2019    Procedure: Valvuloplasty;  Surgeon: Wayne Johnson MD;  Location: [facility] INVASIVE LOCATION;  Service: Cardiology    CARDIAC CATHETERIZATION      CARDIAC CATHETERIZATION Left 5/31/2023    Procedure: Cardiac Catheterization/Vascular Study;  Surgeon: Poncho Reid MD;  Location: [facility] CATH INVASIVE LOCATION;  Service: Cardiovascular;  Laterality: Left;    CARDIAC SURGERY      CARDIAC VALVE REPLACEMENT  2021    CHOLECYSTECTOMY      COLONOSCOPY N/A 12/29/2021    Procedure: COLONOSCOPY;  Surgeon: Karine Orlando,  MD;  Location: [facility] ENDOSCOPY;  Service: Gastroenterology;  Laterality: N/A;  POOR PREP    COLONOSCOPY N/A 04/13/2022    Procedure: COLONOSCOPY WITH POLYPECTOMY;  Surgeon: Karine Orlando MD;  Location: [facility] ENDOSCOPY;  Service: Gastroenterology;  Laterality: N/A;  COLON POLYP, HEMORRHOIDS, POOR PREP    COLONOSCOPY      DIRECT LARYNGOSCOPY, ESOPHAGOSCOPY, BRONCHOSCOPY N/A 5/22/2023    Procedure: DIRECT LARYNGOSCOPY WITH BIOPSIES , ESOPHAGOSCOPY, BRONCHOSCOPY;  Surgeon: Ronnie Mcgarry MD;  Location: [facility] OR OSC;  Service: ENT;  Laterality: N/A;    ENDOSCOPY N/A 12/29/2021    Procedure: ESOPHAGOGASTRODUODENOSCOPY;  Surgeon: Karine Orlando MD;  Location: [facility] ENDOSCOPY;  Service: Gastroenterology;  Laterality: N/A;  ESOPHAGITIS, GASTRITIS, HIATAL HERNIA    ENDOSCOPY      HYSTERECTOMY      LYMPHADENECTOMY      PAIN PUMP INSERTION/REVISION N/A 10/18/2019    Procedure: PAIN PUMP INSERTION [facility] [place] 10[facility] [place];  Surgeon: Horace Rios MD;  Location: [facility] OR;  Service: Pain Management    PAIN PUMP INSERTION/REVISION N/A 11/23/2020    Procedure: pain pump removal;  Surgeon: Horace Rios MD;  Location: [facility] OR;  Service: Pain Management;  Laterality: N/A;    PEG TUBE INSERTION N/A 7/26/2023    Procedure: PERCUTANEOUS ENDOSCOPIC GASTROSTOMY TUBE INSERTION;  Surgeon: Kody Mercer MD;  Location: [facility] ENDOSCOPY;  Service: General;  Laterality: N/A;  SUCCESSFUL PEG TUBE PLACE PLACEMENT    PORTACATH PLACEMENT      IN LEFT CHEST REPORTS THAT IT IS NOT FUNCTIONING    SKIN BIOPSY      TONSILLECTOMY      TUBAL ABDOMINAL LIGATION      TUMOR REMOVAL       Family History   Problem Relation Age of Onset    Heart failure Mother     Anxiety disorder Mother     Prostate cancer Father     Depression Sister     Bipolar disorder Sister     Pancreatic cancer Sister     ADD / ADHD Brother     Thyroid cancer Maternal Grandmother     Pancreatic cancer Paternal Grandmother     ADD /  ADHD Grandson     ADD / ADHD Granddaughter     ADD / ADHD Nephew     [place] Hyperthermia Neg Hx        Home Medications:  Prior to Admission medications    Medication Sig Start Date End Date Taking? Authorizing Provider   albuterol sulfate  (90 Base) MCG/ACT inhaler Inhale 2 puffs Every 4 (Four) Hours As Needed for Wheezing. 11/20/23   Katia Wright MD   ALPRAZolam (Xanax) 0.5 MG tablet Take 1 tablet by mouth 2 (Two) Times a Day As Needed for Anxiety for up to 60 days. 1/19/24 3/19/24  Katia Wright MD   bacitracin 500 UNIT/GM ointment Apply 1 Application topically to the appropriate area as directed 2 (Two) Times a Day. 1/30/24   Katia Wright MD   cetirizine (zyrTEC) 10 MG tablet Take 1 tablet by mouth Daily. 10/31/23   Nette Jiménez MD   clopidogrel (PLAVIX) 75 MG tablet Take 1 tablet by mouth Daily. 2/27/23   Nette Jiménez MD   furosemide (LASIX) 40 MG tablet Take 1 tablet by mouth Daily.    Nette Jiménez MD   gabapentin (NEURONTIN) 300 MG capsule Take 1 capsule by mouth 4 (Four) Times a Day.    Nette Jiménez MD   HYDROcodone-acetaminophen (NORCO) 5-325 MG per tablet Take 1 tablet by mouth Daily As Needed for Moderate Pain.    Nette Jiménez MD   hydroxychloroquine (PLAQUENIL) 200 MG tablet Take 1 tablet by mouth Daily.    Nette Jiménez MD   mupirocin (BACTROBAN) 2 % ointment Apply 1 application  topically to the appropriate area as directed 3 (Three) Times a Day. 11/16/23   Katia Wright MD   ondansetron (Zofran) 4 MG tablet Take 1 tablet by mouth Every 12 (Twelve) Hours As Needed for Nausea or Vomiting. 1/22/24   Katia Wright MD   pain patient supplied pump by Intrathecal route Continuous.   Type of Medication: Hydromorphone  and Bupivacaine  Provider:Dr. Boudreaux  Provider number: [phone]  Basal Dose: Hydromorphone 7.518 mg/day Bupivacaine 0.97373 mg/day  ( MAX of Hydromorphone 9.940 mg/ day; Bupivacaine 0.62137 mg /day)  Bolus Dose:Hydromorphone 0.500  "mg, Bupivacaine 0.99199 mg  Lockout 3 hours. 1 Bolus per every 3 hours max of 5 bolus per day  Next refill due: July 13 2023  Pump manufacture:[employer]    ProviderNette MD   predniSONE (DELTASONE) 5 MG tablet Take 1 tablet by mouth Daily for 30 days. 1/9/24 2/8/24  Katia Wright MD   Acetaminophen Extra Strength 500 MG tablet TAKE 2 TO 3 TABLETS BY MOUTH EVERY 6 HOURS AS NEEDED FOR MILD PAIN 7/10/23 2/5/24  Vidal Luevano APRN   azithromycin (ZITHROMAX) 250 MG tablet Take 2 tablets PO the first day, then 1 tablet PO daily for 4 days. 1/9/24 2/5/24  Katia Wright MD   B-D UF III MINI PEN NEEDLES 31G X 5 MM misc Inject 100 each under the skin into the appropriate area as directed 4 (Four) Times a Day. 4/28/23 2/5/24  Vidal Luevano APRN   Elastic Bandages & Supports (ACE Elastic Bandage 4\") misc 1 Device Daily. One on each leg, bilateral.   #24 = one month supply. 9/30/22 2/5/24  Vidal Luevano APRN   furosemide (LASIX) 40 MG tablet Take 1 tablet by mouth Daily. 9/29/23 2/5/24  Katia Wright MD   gabapentin (NEURONTIN) 300 MG capsule TAKE ONE CAPSULE BY MOUTH THREE TIMES DAILY  Patient taking differently: Take 1 capsule by mouth 4 (Four) Times a Day As Needed. 2/22/21 2/5/24  Horace Rios MD   glucose blood test strip Use as instructed 4/12/23 2/5/24  Vidal Luevano APRN   glucose monitor monitoring kit 1 each As Needed (Test blood sugar TID). 4/12/23 2/5/24  Vidal Luevano APRN   HYDROcodone-acetaminophen (Norco) 5-325 MG per tablet Take 1 tablet by mouth As Needed for Moderate Pain for up to 30 days. 1/9/24 2/5/24  Katia Wright MD   HYDROmorphone (DILAUDID) 1 mg/mL solution Infuse  into a venous catheter Dose Adjusted By Provider As Needed. PER PAIN PUMP  2/5/24  Provider, Historical, MD   HYDROmorphone HCl (DILAUDID) 0.2 MG/ML injection   2/5/24  Provider, MD Nette   hydroxychloroquine (Plaquenil) 200 MG tablet Take 1 tablet by mouth Daily for 90 days. Indications: Rheumatoid " "Arthritis  Patient taking differently: Take 1 tablet by mouth Daily. 12/13/23 2/5/24  Katia Wright MD   insulin detemir (LEVEMIR) 100 UNIT/ML injection Inject 10 Units under the skin into the appropriate area as directed Daily for 90 days. 5/9/23 2/5/24  Vidal Luevano APRN   Insulin Lispro, 1 Unit Dial, (HUMALOG) 100 UNIT/ML solution pen-injector Inject 100 Units as directed Daily With Dinner. Inject 1to 10 units with meals per sliding scale, max of 30 units per day 11/28/23 2/5/24  Katia Wright MD   Insulin Syringe 31G X 5/16\" 1 ML misc 1 each Daily. 5/9/23 2/5/24  Vidal Luevano APRN   Lancets misc 1 each 3 (Three) Times a Day. 4/28/23 2/5/24  Vidal Luevano APRN   loperamide (IMODIUM) 2 MG capsule Take 1 capsule by mouth 4 (Four) Times a Day As Needed for Diarrhea. 9/15/23 2/5/24  Goyo Nunez MD   SITagliptin (JANUVIA) 25 MG tablet Daily.  2/5/24  Provider, MD Nette        Social History:   Social History     Tobacco Use    Smoking status: Every Day     Packs/day: 1.50     Years: 20.00     Additional pack years: 0.00     Total pack years: 30.00     Types: Cigarettes     Start date: 1979    Smokeless tobacco: Never   Vaping Use    Vaping Use: Never used   Substance Use Topics    Alcohol use: Never    Drug use: Never         Review of Systems:  Review of Systems   Constitutional:  Positive for chills and fever.   HENT:  Negative for congestion, ear pain and sore throat.    Eyes:  Negative for pain.   Respiratory:  Positive for cough and shortness of breath. Negative for chest tightness.    Cardiovascular:  Negative for chest pain.   Gastrointestinal:  Positive for abdominal pain, diarrhea and nausea. Negative for vomiting.   Genitourinary:  Negative for flank pain and hematuria.   Musculoskeletal:  Negative for joint swelling.   Skin:  Negative for pallor.   Neurological:  Negative for seizures and headaches.   All other systems reviewed and are negative.       Physical Exam:  BP (!) 71/50 (BP " "Location: Right arm, Patient Position: Lying)   Pulse 100   Temp 97.8 °F (36.6 °C) (Oral)   Resp 23   Ht 162.6 cm (64\")   Wt 60.4 kg (133 lb 2.5 oz)   SpO2 99%   BMI 22.86 kg/m²     Physical Exam  Constitutional:       Appearance: She is ill-appearing.   HENT:      Head: Normocephalic and atraumatic.      Mouth/Throat:      Mouth: Mucous membranes are moist.   Eyes:      Extraocular Movements: Extraocular movements intact.      Pupils: Pupils are equal, round, and reactive to light.   Cardiovascular:      Rate and Rhythm: Regular rhythm. Tachycardia present.   Pulmonary:      Breath sounds: Wheezing and rhonchi present.   Abdominal:      General: There is no distension.      Palpations: Abdomen is soft.      Tenderness: There is generalized abdominal tenderness.   Skin:     General: Skin is warm.      Capillary Refill: Capillary refill takes less than 2 seconds.   Neurological:      General: No focal deficit present.                  Procedures:  Procedures      Medical Decision Making:      Comorbidities that affect care:    Lupus, aortic stenosis, Asthma, Cancer, Congestive Heart Failure, COPD, Diabetes, Hypertension    External Notes reviewed:    Previous Clinic Note: Patient seen by PCP on 1/9/2024 for osteoarthritis of hip, lupus, inflammatory arthritis, malignant neoplasm of supraglottis, COPD, coronary artery disease, anxiety, chronic pain.      The following orders were placed and all results were independently analyzed by me:  Orders Placed This Encounter   Procedures    Blood Culture - Blood,    Blood Culture - Blood,    COVID-19, FLU A/B, RSV PCR 1 HR TAT - Swab, Nasopharynx    XR Chest 1 View    CT Abdomen Pelvis Without Contrast    [place] Draw    Comprehensive Metabolic Panel    Protime-INR    aPTT    Magnesium    Phosphorus    Lactic Acid, Plasma    C-reactive Protein    Blood Gas, Arterial -With Co-Ox Panel: Yes    Urinalysis With Microscopic If Indicated (No Culture) - Urine, Catheter    CBC " Auto Differential    VBG with Co-Ox and Electrolytes    Occult Blood, Fecal By Immunoassay - Stool, Per Rectum    High Sensitivity Troponin T    BNP    High Sensitivity Troponin T 2Hr    Urinalysis, Microscopic Only - Urine, Clean Catch    Procalcitonin    Comprehensive Metabolic Panel    CBC Auto Differential    Lactic Acid, Plasma    Magnesium    Diet: Regular/House Diet; Texture: Regular Texture (IDDSI 7); Fluid Consistency: Thin (IDDSI 0)    Undress & Gown    Continuous Pulse Oximetry    Measure Blood Pressure    Vital Signs    Vital Signs    Intake & Output    Weigh Patient    Oral Care    Place Sequential Compression Device    Maintain Sequential Compression Device    Telemetry - Maintain IV Access    Telemetry - Place Orders & Notify Provider of Results When Patient Experiences Acute Chest Pain, Dysrhythmia or Respiratory Distress    Activity - Bed Rest With Exceptions (Specify)    Notify Provider (With Default Parameters)    Code Status and Medical Interventions:    Inpatient Hospitalist Consult    Inpatient Pulmonology Consult    Pharmacy to Review Allergies & Recommend Antimicrobials    OT Consult: Eval & Treat    PT Consult: Eval & Treat Functional Mobility Below Baseline    Oxygen Therapy- Nasal Cannula; Titrate 1-6 LPM Per SpO2; 90 - 95%    Oxygen Therapy- Nasal Cannula; Titrate 1-6 LPM Per SpO2; 90 - 95%    ECG 12 Lead Other; Sepsis    Wound Ostomy Eval & Treat    Insert Large Bore Peripheral IV    Insert 2nd Large Bore Peripheral IV    Insert Peripheral IV    Inpatient Admission    CBC & Differential    Green Top (Gel)    Lavender Top    Gold Top - SST    Light Blue Top       Medications Given in the Emergency Department:  Medications   sodium chloride 0.9 % flush 10 mL (has no administration in time range)   acetaminophen (TYLENOL) tablet 975 mg (975 mg Oral Not Given 2/5/24 0147)   sodium chloride 0.9 % flush 10 mL (has no administration in time range)   sodium chloride 0.9 % flush 10 mL (has no  administration in time range)   sodium chloride 0.9 % infusion 40 mL (has no administration in time range)   sennosides-docusate (PERICOLACE) 8.6-50 MG per tablet 2 tablet (has no administration in time range)     And   polyethylene glycol (MIRALAX) packet 17 g (has no administration in time range)     And   bisacodyl (DULCOLAX) EC tablet 5 mg (has no administration in time range)     And   bisacodyl (DULCOLAX) suppository 10 mg (has no administration in time range)   nitroglycerin (NITROSTAT) SL tablet 0.4 mg (has no administration in time range)   sodium chloride 0.9 % infusion (has no administration in time range)   ondansetron (ZOFRAN) injection 4 mg (has no administration in time range)   norepinephrine (LEVOPHED) 8 mg in 250 mL NS infusion (premix) (has no administration in time range)   sodium chloride 0.9 % bolus 500 mL (has no administration in time range)   sodium chloride 0.9 % bolus 1,000 mL (0 mL Intravenous Stopped 2/5/24 0217)   sodium chloride 0.9 % infusion  - ADS Override Pull (0 mL/hr  Stopped 2/5/24 0318)   levoFLOXacin (LEVAQUIN) 750 mg/150 mL D5W (premix) (LEVAQUIN) 750 mg (0 mg Intravenous Stopped 2/5/24 0343)   acetaminophen (TYLENOL) suppository 650 mg (650 mg Rectal Given 2/5/24 0211)        ED Course:    ED Course as of 02/05/24 0712   Mon Feb 05, 2024   0709 ECG 12 Lead Other; Sepsis  Sinus tachycardia with rate of 134.  PVCs present.  Normal CO and QTc.  LVH.  EKG interpreted by me [LD]      ED Course User Index  [LD] Bethanie Allred MD       Labs:    Lab Results (last 24 hours)       Procedure Component Value Units Date/Time    Blood Culture - Blood, Arm, Left [[id number]] Collected: 02/05/24 0112    Specimen: Blood from Arm, Left Updated: 02/05/24 0112    Blood Culture - Blood, Arm, Right [[id number]] Collected: 02/05/24 0112    Specimen: Blood from Arm, Right Updated: 02/05/24 0112    CBC & Differential [[id number]]  (Abnormal) Collected: 02/05/24 0113    Specimen: Blood Updated:  02/05/24 0116    Narrative:      The following orders were created for panel order CBC & Differential.  Procedure                               Abnormality         Status                     ---------                               -----------         ------                     CBC Auto Differential[[id number]]        Abnormal            Final result                 Please view results for these tests on the individual orders.    Protime-INR [[id number]]  (Normal) Collected: 02/05/24 0113    Specimen: Blood Updated: 02/05/24 0127     Protime 14.3 Seconds      INR 1.09    Narrative:      Suggested Therapeutic Ranges For Oral Anticoagulant Therapy:  Level of Therapy                      INR Target Range  Standard Dose                            2.0-3.0  High Dose                                2.5-3.5  Patients not receiving anticoagulant  Therapy Normal Range                     0.86-1.15    aPTT [[id number]]  (Normal) Collected: 02/05/24 0113    Specimen: Blood Updated: 02/05/24 0127     PTT 29.9 seconds     Lactic Acid, Plasma [[id number]]  (Normal) Collected: 02/05/24 0113    Specimen: Blood Updated: 02/05/24 0133     Lactate 0.8 mmol/L     CBC Auto Differential [[id number]]  (Abnormal) Collected: 02/05/24 0113    Specimen: Blood Updated: 02/05/24 0116     WBC 8.50 10*3/mm3      RBC 4.46 10*6/mm3      Hemoglobin 12.5 g/dL      Hematocrit 39.7 %      MCV 89.0 fL      MCH 28.0 pg      MCHC 31.5 g/dL      RDW 14.6 %      RDW-SD 47.4 fl      MPV 10.6 fL      Platelets 150 10*3/mm3      Neutrophil % 87.7 %      Lymphocyte % 4.6 %      Monocyte % 6.4 %      Eosinophil % 0.7 %      Basophil % 0.1 %      Immature Grans % 0.5 %      Neutrophils, Absolute 7.46 10*3/mm3      Lymphocytes, Absolute 0.39 10*3/mm3      Monocytes, Absolute 0.54 10*3/mm3      Eosinophils, Absolute 0.06 10*3/mm3      Basophils, Absolute 0.01 10*3/mm3      Immature Grans, Absolute 0.04 10*3/mm3      nRBC 0.0 /100 WBC     Occult Blood, Fecal By Immunoassay  - Stool, Per Rectum [[id number]]  (Abnormal) Collected: 02/05/24 0118    Specimen: Stool from Per Rectum Updated: 02/05/24 0201     Occult Blood, Fecal by Immunoassay Positive    Comprehensive Metabolic Panel [[id number]]  (Abnormal) Collected: 02/05/24 0136    Specimen: Blood Updated: 02/05/24 0209     Glucose 126 mg/dL      BUN 10 mg/dL      Creatinine 0.57 mg/dL      Sodium 138 mmol/L      Potassium 4.5 mmol/L      Comment: Slight hemolysis detected by analyzer. Result may be falsely elevated.        Chloride 98 mmol/L      CO2 30.2 mmol/L      Calcium 9.3 mg/dL      Total Protein 7.5 g/dL      Albumin 4.0 g/dL      ALT (SGPT) 9 U/L      AST (SGOT) 15 U/L      Alkaline Phosphatase 80 U/L      Total Bilirubin 0.4 mg/dL      Globulin 3.5 gm/dL      A/G Ratio 1.1 g/dL      BUN/Creatinine Ratio 17.5     Anion Gap 9.8 mmol/L      eGFR 105.5 mL/min/1.73     Narrative:      GFR Normal >60  Chronic Kidney Disease <60  Kidney Failure <15      Magnesium [[id number]]  (Normal) Collected: 02/05/24 0136    Specimen: Blood Updated: 02/05/24 0209     Magnesium 1.9 mg/dL     Phosphorus [[id number]]  (Normal) Collected: 02/05/24 0136    Specimen: Blood Updated: 02/05/24 0209     Phosphorus 4.5 mg/dL     C-reactive Protein [[id number]]  (Abnormal) Collected: 02/05/24 0136    Specimen: Blood Updated: 02/05/24 0209     C-Reactive Protein 11.43 mg/dL     High Sensitivity Troponin T [[id number]]  (Abnormal) Collected: 02/05/24 0136    Specimen: Blood Updated: 02/05/24 0349     HS Troponin T 26 ng/L     Narrative:      High Sensitive Troponin T Reference Range:  <14.0 ng/L- Negative Female for AMI  <22.0 ng/L- Negative Male for AMI  >=14 - Abnormal Female indicating possible myocardial injury.  >=22 - Abnormal Male indicating possible myocardial injury.   Clinicians would have to utilize clinical acumen, EKG, Troponin, and serial changes to determine if it is an Acute Myocardial Infarction or myocardial injury due to an underlying chronic  condition.         BNP [[id number]]  (Normal) Collected: 02/05/24 0136    Specimen: Blood Updated: 02/05/24 0349     proBNP 650.2 pg/mL     Narrative:      This assay is used as an aid in the diagnosis of individuals suspected of having heart failure. It can be used as an aid in the diagnosis of acute decompensated heart failure (ADHF) in patients presenting with signs and symptoms of ADHF to the emergency department (ED). In addition, NT-proBNP of <300 pg/mL indicates ADHF is not likely.    Age Range Result Interpretation  NT-proBNP Concentration (pg/mL:      <50             Positive            >450                   Gray                 300-450                    Negative             <300    50-75           Positive            >900                  Gray                300-900                  Negative            <300      >75             Positive            >1800                  Gray                300-1800                  Negative            <300    COVID-19, FLU A/B, RSV PCR 1 HR TAT - Swab, Nasopharynx [[id number]]  (Normal) Collected: 02/05/24 0147    Specimen: Swab from Nasopharynx Updated: 02/05/24 0242     COVID19 Not Detected     Influenza A PCR Not Detected     Influenza B PCR Not Detected     RSV, PCR Not Detected    Narrative:      Fact sheet for providers: https://www.fda.gov/media/247952/download    Fact sheet for patients: https://www.fda.gov/media/496533/download    Test performed by PCR.    Blood Gas, Arterial -With Co-Ox Panel: Yes [[id number]]  (Abnormal) Collected: 02/05/24 0159    Specimen: Arterial Blood from Arm, Right Updated: 02/05/24 0203     pH, Arterial 7.307 pH units      pCO2, Arterial 45.6 mm Hg      pO2, Arterial 56.3 mm Hg      HCO3, Arterial 22.3 mmol/L      Base Excess, Arterial -4.1 mmol/L      O2 Saturation, Arterial 84.6 %      Hemoglobin, Blood Gas 14.6 g/dL      Carboxyhemoglobin 4.7 %      Methemoglobin 0.10 %      Oxyhemoglobin 80.5 %      FHHB 14.7 %      Site Arterial:  right brachial     Modality Cannula     FIO2 36 %      Flow Rate 4 lpm      PO2/FIO2 156     Shane's Test N/A     Lactate, Arterial --    Urinalysis With Microscopic If Indicated (No Culture) - Straight Cath [[id number]]  (Abnormal) Collected: 02/05/24 0420    Specimen: Urine from Straight Cath Updated: 02/05/24 0453     Color, UA Yellow     Appearance, UA Cloudy     pH, UA 5.5     Specific Gravity, UA 1.021     Glucose, UA Negative     Ketones, UA Negative     Bilirubin, UA Negative     Blood, UA Small (1+)     Protein, UA Negative     Leuk Esterase, UA Moderate (2+)     Nitrite, UA Negative     Urobilinogen, UA 0.2 E.U./dL    Urinalysis, Microscopic Only - Straight Cath [[id number]]  (Abnormal) Collected: 02/05/24 0420    Specimen: Urine from Straight Cath Updated: 02/05/24 0507     RBC, UA 3-5 /HPF      WBC, UA 6-10 /HPF      Bacteria, UA None Seen /HPF      Squamous Epithelial Cells, UA 0-2 /HPF      Hyaline Casts, UA None Seen /LPF      Calcium Oxalate Crystals, UA Small/1+ /HPF      Methodology Manual Light Microscopy    High Sensitivity Troponin T 2Hr [[id number]]  (Abnormal) Collected: 02/05/24 0444    Specimen: Blood from Arm, Right Updated: 02/05/24 0532     HS Troponin T 52 ng/L      Troponin T Delta 26 ng/L     Narrative:      High Sensitive Troponin T Reference Range:  <14.0 ng/L- Negative Female for AMI  <22.0 ng/L- Negative Male for AMI  >=14 - Abnormal Female indicating possible myocardial injury.  >=22 - Abnormal Male indicating possible myocardial injury.   Clinicians would have to utilize clinical acumen, EKG, Troponin, and serial changes to determine if it is an Acute Myocardial Infarction or myocardial injury due to an underlying chronic condition.         Procalcitonin [[id number]]  (Abnormal) Collected: 02/05/24 0444    Specimen: Blood from Arm, Right Updated: 02/05/24 0626     Procalcitonin 16.45 ng/mL     Narrative:      As a Marker for Sepsis (Non-Neonates):    1. <0.5 ng/mL represents a low  "risk of severe sepsis and/or septic shock.  2. >2 ng/mL represents a high risk of severe sepsis and/or septic shock.    As a Marker for Lower Respiratory Tract Infections that require antibiotic therapy:    PCT on Admission    Antibiotic Therapy       6-12 Hrs later    >0.5                Strongly Recommended  >0.25 - <0.5        Recommended  0.1 - 0.25          Discouraged              Remeasure/reassess PCT  <0.1                Strongly Discouraged     Remeasure/reassess PCT    As 28 day mortality risk marker: \"Change in Procalcitonin Result\" (>80% or <=80%) if Day 0 (or Day 1) and Day 4 values are available. Refer to http://www.[facility]pct[facility]calculator.com    Change in PCT <=80%  A decrease of PCT levels below or equal to 80% defines a positive change in PCT test result representing a higher risk for 28-day all-cause mortality of patients diagnosed with severe sepsis for septic shock.    Change in PCT >80%  A decrease of PCT levels of more than 80% defines a negative change in PCT result representing a lower risk for 28-day all-cause mortality of patients diagnosed with severe sepsis or septic shock.    This test is Prognostic not Diagnostic, if elevated correlate with clinical findings before administering antibiotic treatment.                 Imaging:    CT Abdomen Pelvis Without Contrast    Result Date: 2/5/2024  PROCEDURE: CT ABDOMEN PELVIS WO CONTRAST  COMPARISONS: 9/5/2023; 5/26/2023.  INDICATIONS: Abdominal pain, not otherwise specified.  TECHNIQUE:  744 CT images were created without intravenous or oral contrast agent administration.  Despite best efforts, poor image quality limits interpretation of the study, especially related to motion artifact, the patient's upper extremities in the scan field of view, and other external densities in the scan field of view.  This study was not tailored to evaluate the patient's upper extremities.  Also, there is a suspected intrathecal pain pump delivery system in place " with associated streak artifact, especially related to the pump reservoir, which is implanted in the right posterior abdominal wall.  PROTOCOL:   Standard imaging protocol performed.    RADIATION:   Total DLP: 845.2 mGy*cm.   Automated exposure control was utilized to minimize radiation dose.  FINDINGS:  Again, motion artifact obscures detail on the exam.  Bibasilar infiltrates are possible.  These findings may represent pneumonia, including aspiration pneumonia.  Pulmonary edema is possible but thought to be less likely.  Atelectasis alone is thought to be least likely.  Diffuse hepatic steatosis is noted with borderline hepatomegaly.  The spleen measures 13.3 cm in maximum craniocaudal diameter.  Borderline splenomegaly is possible.  Degenerative changes involve the imaged spine and the bilateral hip joints.  There is chronic calcified granulomatous disease of the chest and abdomen.  No hydronephrosis or obstructive uropathy or definite ureteral calculus.  No definite urinary bladder calculi.  No definite acute pancreatitis.  The patient has undergone cholecystectomy and hysterectomy.  The appendix is seen on image 155 of series 202 and adjacent images and is without acute abnormality.  Atherosclerotic changes are seen without aneurysmal dilatation the aortoiliac arterial system.  No other acute findings are suggested.        Possible bibasilar pneumonia, including aspiration pneumonia.  No other definite acute findings are seen.  Again, the study is very limited.  Please see above comments for further detail.    Please note that portions of this note were completed with a voice recognition program.  JEN JONES JR, MD       Electronically Signed and Approved By: JEN JONES JR, MD on 2/05/2024 at 4:37             XR Chest 1 View    Result Date: 2/5/2024  PROCEDURE: XR CHEST 1 VW  COMPARISON: 9/5/2023 (chest CT); 5/28/2023 (CXR).  INDICATIONS: Possible sepsis.  FINDINGS: A single AP upright portable view of  the chest is provided for review.  Bilateral infiltrates are seen.  The findings may represent pulmonary edema; infectious multifocal pneumonia cannot be excluded but is thought to be less likely.  No cardiomediastinal enlargement.  No pneumothorax.  No pneumomediastinum.  No pleural effusion.  External artifacts obscure detail.  The patient has undergone transcatheter aortic valve replacement (TAVR).  There is a left IJ central venous line in place with its distal tip in the expected lower superior cava (SVC), seen previously.  The nasoenteral feeding tube has been removed. The thoracic aorta is atherosclerotic. Chronic calcified granulomatous disease involves the chest.        Decreased bilateral infiltrates are seen since the prior chest radiographic exam of 5/28/2023.  The remaining infiltrates may represent mild pulmonary edema with vascular congestion.  Infectious multifocal pneumonia cannot be excluded but is thought to be less likely.      Please note that portions of this note were completed with a voice recognition program.  JEN JONES JR, MD       Electronically Signed and Approved By: JEN JONES JR, MD on 2/05/2024 at 3:26                 Differential Diagnosis and Discussion:    Abdominal Pain: Based on the patient's signs and symptoms, I considered abdominal aortic aneurysm, small bowel obstruction, pancreatitis, acute cholecystitis, acute appendecitis, peptic ulcer disease, gastritis, colitis, endocrine disorders, irritable bowel syndrome and other differential diagnosis an etiology of the patient's abdominal pain.  Dyspnea: Differential diagnosis includes but is not limited to metabolic acidosis, neurological disorders, psychogenic, asthma, pneumothorax, upper airway obstruction, COPD, pneumonia, noncardiogenic pulmonary edema, interstitial lung disease, anemia, congestive heart failure, and pulmonary embolism  Fever: Based on the complaint of fever, differential diagnosis includes but is not  limited to meningitis, pneumonia, pyelonephritis, acute uti,  systemic immune response syndrome, sepsis, viral syndrome, fungal infection, tick born illness and other bacterial infections.    All labs were reviewed and interpreted by me.  All X-rays impressions were independently interpreted by me.  EKG was interpreted by me.  CT scan radiology impression was interpreted by me.    MDM  Number of Diagnoses or Management Options  Diagnosis management comments: On arrival patient is febrile with a temp of 102.4.  She is hypotensive.  She was given IV fluids.  She was given dose of antibiotics.  Fortunately patient has multiple drug allergies.  On arrival she was also hypoxic.  She was placed in supplemental oxygen.  Initial troponin elevated at 26.  CRP also elevated.  Patient reported abdominal pain.  CT was obtained that showed pneumonia.  Patient continued to be hypotensive and was started on Levophed.  Discussed patient with hospitalist and will admit for further care.       Amount and/or Complexity of Data Reviewed  Clinical lab tests: reviewed  Tests in the radiology section of CPT®: reviewed  Review and summarize past medical records: yes  Independent visualization of images, tracings, or specimens: yes    Risk of Complications, Morbidity, and/or Mortality  Presenting problems: moderate  Management options: moderate         Critical Care Note: Total Critical Care time of 40 minutes. Total critical care time documented does not include time spent on separately billed procedures for services of nurses or physician assistants. I personally saw and examined the patient. I have reviewed all diagnostic interpretations and treatment plans as written. I was present for the key portions of any procedures performed and the inclusive time noted in any critical care statement. Critical care time includes patient management by me, time spent at the patients bedside,  time to review lab and imaging results, discussing patient  care, documentation in the medical record, and time spent with family or caregiver.    Sepsis criteria was met in the emergency department and the Sepsis protocol (including antibiotic administration) was initiated.      SIRS criteria considered:   1.  Temperature > 100.4 or <98.6    2.  Heart Rate > 90    3.  Respiratory Rate > 22    4.  WBC > 12K or <4K.             Severe Sepsis:     Respiratory: Mechanical Ventilation or Bipap  Hypotension: SBP > 90 or MAP < 65  Renal: Creatinine > 2  Metabolic: Lactic Acid > 2  Hematologic: Platelets < 100K or INR > 1.5  Hepatic: BILI  >  2  CNS: Sudden AMS     Septic Shock:     Severe Sepsis + Persistent hypotension or Lactic Acid > 4     Normal saline bolus, Antibiotics, and final disposition was based on these definitions.        Sepsis was recognized at 0140    Antibiotics were ordered.     30 cc/kg bolus was  indicated.       Patient Care Considerations:    CT HEAD: I considered ordering a noncontrast CT of the head, however no focal deficits      Consultants/Shared Management Plan:    Hospitalist: I have discussed the case with Dr hurtado who agrees to accept the patient for admission.    Social Determinants of Health:    Patient has presented with family members who are responsible, reliable and will ensure follow up care.      Disposition and Care Coordination:    Admit:   Through independent evaluation of the patient's history, physical, and imperical data, the patient meets criteria for inpatient admission to the hospital.        Final diagnoses:   Sepsis, due to unspecified organism, unspecified whether acute organ dysfunction present   Pneumonia due to infectious organism, unspecified laterality, unspecified part of lung   Acute respiratory failure with hypoxia and hypercapnia        ED Disposition       ED Disposition   Decision to Admit    Condition   --    Comment   Level of Care: Critical Care [6]   Diagnosis: Sepsis [[id number]]   Admitting Physician: CASS  SULLY [[id number]]   Certification: I Certify That Inpatient Hospital Services Are Medically Necessary For Greater Than 2 Midnights                 This medical record created using voice recognition software.             Bethanie Allred MD  02/05/24 8215

## 2024-02-05 NOTE — PLAN OF CARE
Goal Outcome Evaluation:  Plan of Care Reviewed With: patient           Outcome Evaluation: VSS. 0.04 Levo. STRICT NPO. Patient and family has been educated in regarding reasons for patient being unable to have medication. Spoke with family member who also requested pain medication for patient. Patient has been updated regarding swallow study that will be done tomorrow. Will continue to monitor patient

## 2024-02-06 ENCOUNTER — APPOINTMENT (OUTPATIENT)
Dept: GENERAL RADIOLOGY | Facility: HOSPITAL | Age: 59
DRG: 871 | End: 2024-02-06
Payer: COMMERCIAL

## 2024-02-06 LAB
ANION GAP SERPL CALCULATED.3IONS-SCNC: 11.2 MMOL/L (ref 5–15)
BACTERIA BLD CULT: ABNORMAL
BOTTLE TYPE: ABNORMAL
BUN SERPL-MCNC: 9 MG/DL (ref 6–20)
BUN/CREAT SERPL: 23.7 (ref 7–25)
CALCIUM SPEC-SCNC: 8.4 MG/DL (ref 8.6–10.5)
CHLORIDE SERPL-SCNC: 105 MMOL/L (ref 98–107)
CO2 SERPL-SCNC: 22.8 MMOL/L (ref 22–29)
CREAT SERPL-MCNC: 0.38 MG/DL (ref 0.57–1)
DEPRECATED RDW RBC AUTO: 49.3 FL (ref 37–54)
EGFRCR SERPLBLD CKD-EPI 2021: 116.3 ML/MIN/1.73
ERYTHROCYTE [DISTWIDTH] IN BLOOD BY AUTOMATED COUNT: 14.7 % (ref 12.3–15.4)
GLUCOSE SERPL-MCNC: 94 MG/DL (ref 65–99)
HCT VFR BLD AUTO: 35.4 % (ref 34–46.6)
HGB BLD-MCNC: 10.6 G/DL (ref 12–15.9)
LIPASE SERPL-CCNC: 10 U/L (ref 13–60)
MAGNESIUM SERPL-MCNC: 2 MG/DL (ref 1.6–2.6)
MCH RBC QN AUTO: 27.5 PG (ref 26.6–33)
MCHC RBC AUTO-ENTMCNC: 29.9 G/DL (ref 31.5–35.7)
MCV RBC AUTO: 91.7 FL (ref 79–97)
PHOSPHATE SERPL-MCNC: 2.3 MG/DL (ref 2.5–4.5)
PLATELET # BLD AUTO: 97 10*3/MM3 (ref 140–450)
PMV BLD AUTO: 10 FL (ref 6–12)
POTASSIUM SERPL-SCNC: 4.2 MMOL/L (ref 3.5–5.2)
RBC # BLD AUTO: 3.86 10*6/MM3 (ref 3.77–5.28)
SODIUM SERPL-SCNC: 139 MMOL/L (ref 136–145)
WBC NRBC COR # BLD AUTO: 5.23 10*3/MM3 (ref 3.4–10.8)

## 2024-02-06 PROCEDURE — 83735 ASSAY OF MAGNESIUM: CPT | Performed by: NURSE PRACTITIONER

## 2024-02-06 PROCEDURE — 97162 PT EVAL MOD COMPLEX 30 MIN: CPT

## 2024-02-06 PROCEDURE — 25810000003 SODIUM CHLORIDE 0.9 % SOLUTION: Performed by: FAMILY MEDICINE

## 2024-02-06 PROCEDURE — 87040 BLOOD CULTURE FOR BACTERIA: CPT | Performed by: INTERNAL MEDICINE

## 2024-02-06 PROCEDURE — 25810000003 SODIUM CHLORIDE 0.9 % SOLUTION: Performed by: INTERNAL MEDICINE

## 2024-02-06 PROCEDURE — 99291 CRITICAL CARE FIRST HOUR: CPT | Performed by: STUDENT IN AN ORGANIZED HEALTH CARE EDUCATION/TRAINING PROGRAM

## 2024-02-06 PROCEDURE — 80048 BASIC METABOLIC PNL TOTAL CA: CPT | Performed by: NURSE PRACTITIONER

## 2024-02-06 PROCEDURE — 25010000002 HEPARIN (PORCINE) PER 1000 UNITS: Performed by: NURSE PRACTITIONER

## 2024-02-06 PROCEDURE — 25010000002 CEFEPIME PER 500 MG: Performed by: NURSE PRACTITIONER

## 2024-02-06 PROCEDURE — 74230 X-RAY XM SWLNG FUNCJ C+: CPT

## 2024-02-06 PROCEDURE — 94660 CPAP INITIATION&MGMT: CPT

## 2024-02-06 PROCEDURE — 84100 ASSAY OF PHOSPHORUS: CPT | Performed by: NURSE PRACTITIONER

## 2024-02-06 PROCEDURE — 94799 UNLISTED PULMONARY SVC/PX: CPT

## 2024-02-06 PROCEDURE — 83690 ASSAY OF LIPASE: CPT | Performed by: FAMILY MEDICINE

## 2024-02-06 PROCEDURE — 25810000003 SODIUM CHLORIDE 0.9 % SOLUTION: Performed by: NURSE PRACTITIONER

## 2024-02-06 PROCEDURE — 25010000002 ONDANSETRON PER 1 MG: Performed by: FAMILY MEDICINE

## 2024-02-06 PROCEDURE — 85027 COMPLETE CBC AUTOMATED: CPT | Performed by: NURSE PRACTITIONER

## 2024-02-06 PROCEDURE — 25010000002 HYDROMORPHONE 1 MG/ML SOLUTION: Performed by: FAMILY MEDICINE

## 2024-02-06 PROCEDURE — 94664 DEMO&/EVAL PT USE INHALER: CPT

## 2024-02-06 PROCEDURE — 92611 MOTION FLUOROSCOPY/SWALLOW: CPT

## 2024-02-06 PROCEDURE — 99233 SBSQ HOSP IP/OBS HIGH 50: CPT | Performed by: INTERNAL MEDICINE

## 2024-02-06 RX ORDER — IPRATROPIUM BROMIDE AND ALBUTEROL SULFATE 2.5; .5 MG/3ML; MG/3ML
3 SOLUTION RESPIRATORY (INHALATION)
Status: DISCONTINUED | OUTPATIENT
Start: 2024-02-06 | End: 2024-02-09 | Stop reason: HOSPADM

## 2024-02-06 RX ORDER — NOREPINEPHRINE BITARTRATE 0.03 MG/ML
.02-.3 INJECTION, SOLUTION INTRAVENOUS
Status: DISCONTINUED | OUTPATIENT
Start: 2024-02-06 | End: 2024-02-08

## 2024-02-06 RX ORDER — DIAZEPAM 5 MG/ML
2.5 INJECTION, SOLUTION INTRAMUSCULAR; INTRAVENOUS 2 TIMES DAILY PRN
Status: DISCONTINUED | OUTPATIENT
Start: 2024-02-06 | End: 2024-02-06

## 2024-02-06 RX ORDER — NICOTINE 21 MG/24HR
1 PATCH, TRANSDERMAL 24 HOURS TRANSDERMAL
Status: DISCONTINUED | OUTPATIENT
Start: 2024-02-07 | End: 2024-02-09 | Stop reason: HOSPADM

## 2024-02-06 RX ORDER — LACTULOSE 10 G/15ML
30 SOLUTION ORAL ONCE
Status: DISCONTINUED | OUTPATIENT
Start: 2024-02-06 | End: 2024-02-09 | Stop reason: HOSPADM

## 2024-02-06 RX ORDER — FENTANYL/ROPIVACAINE/NS/PF 2-625MCG/1
15 PLASTIC BAG, INJECTION (ML) EPIDURAL ONCE
Status: COMPLETED | OUTPATIENT
Start: 2024-02-06 | End: 2024-02-06

## 2024-02-06 RX ORDER — ALPRAZOLAM 0.25 MG/1
0.5 TABLET ORAL 2 TIMES DAILY PRN
Status: DISCONTINUED | OUTPATIENT
Start: 2024-02-06 | End: 2024-02-07

## 2024-02-06 RX ADMIN — IPRATROPIUM BROMIDE AND ALBUTEROL SULFATE 3 ML: .5; 3 SOLUTION RESPIRATORY (INHALATION) at 11:53

## 2024-02-06 RX ADMIN — BARIUM SULFATE 55 ML: 0.81 POWDER, FOR SUSPENSION ORAL at 10:02

## 2024-02-06 RX ADMIN — CEFEPIME 2000 MG: 2 INJECTION, POWDER, FOR SOLUTION INTRAVENOUS at 12:53

## 2024-02-06 RX ADMIN — HEPARIN SODIUM 5000 UNITS: 5000 INJECTION INTRAVENOUS; SUBCUTANEOUS at 21:41

## 2024-02-06 RX ADMIN — ARFORMOTEROL TARTRATE 15 MCG: 15 SOLUTION RESPIRATORY (INHALATION) at 06:19

## 2024-02-06 RX ADMIN — IPRATROPIUM BROMIDE AND ALBUTEROL SULFATE 3 ML: .5; 3 SOLUTION RESPIRATORY (INHALATION) at 06:18

## 2024-02-06 RX ADMIN — POTASSIUM PHOSPHATE, MONOBASIC POTASSIUM PHOSPHATE, DIBASIC 15 MMOL: 224; 236 INJECTION, SOLUTION, CONCENTRATE INTRAVENOUS at 10:32

## 2024-02-06 RX ADMIN — NOREPINEPHRINE BITARTRATE 0.02 MCG/KG/MIN: 0.03 INJECTION, SOLUTION INTRAVENOUS at 21:25

## 2024-02-06 RX ADMIN — ALPRAZOLAM 0.5 MG: 0.25 TABLET ORAL at 12:52

## 2024-02-06 RX ADMIN — IPRATROPIUM BROMIDE AND ALBUTEROL SULFATE 3 ML: .5; 3 SOLUTION RESPIRATORY (INHALATION) at 18:24

## 2024-02-06 RX ADMIN — ONDANSETRON 4 MG: 2 INJECTION INTRAMUSCULAR; INTRAVENOUS at 15:32

## 2024-02-06 RX ADMIN — CLOPIDOGREL BISULFATE 75 MG: 75 TABLET, FILM COATED ORAL at 10:32

## 2024-02-06 RX ADMIN — Medication 10 ML: at 10:32

## 2024-02-06 RX ADMIN — BARIUM SULFATE 50 ML: 400 SUSPENSION ORAL at 10:03

## 2024-02-06 RX ADMIN — Medication 10 ML: at 21:26

## 2024-02-06 RX ADMIN — HYDROMORPHONE HYDROCHLORIDE 1 MG: 1 INJECTION, SOLUTION INTRAMUSCULAR; INTRAVENOUS; SUBCUTANEOUS at 03:38

## 2024-02-06 RX ADMIN — HEPARIN SODIUM 5000 UNITS: 5000 INJECTION INTRAVENOUS; SUBCUTANEOUS at 16:00

## 2024-02-06 RX ADMIN — SODIUM CHLORIDE 75 ML/HR: 9 INJECTION, SOLUTION INTRAVENOUS at 12:27

## 2024-02-06 RX ADMIN — SODIUM CHLORIDE 125 ML/HR: 9 INJECTION, SOLUTION INTRAVENOUS at 04:46

## 2024-02-06 RX ADMIN — HEPARIN SODIUM 5000 UNITS: 5000 INJECTION INTRAVENOUS; SUBCUTANEOUS at 06:30

## 2024-02-06 RX ADMIN — BARIUM SULFATE 1 TEASPOON(S): 0.6 CREAM ORAL at 10:03

## 2024-02-06 RX ADMIN — BUDESONIDE 0.5 MG: 0.5 SUSPENSION RESPIRATORY (INHALATION) at 06:19

## 2024-02-06 RX ADMIN — CEFEPIME 2000 MG: 2 INJECTION, POWDER, FOR SOLUTION INTRAVENOUS at 18:26

## 2024-02-06 RX ADMIN — BUDESONIDE 0.5 MG: 0.5 SUSPENSION RESPIRATORY (INHALATION) at 18:24

## 2024-02-06 RX ADMIN — ARFORMOTEROL TARTRATE 15 MCG: 15 SOLUTION RESPIRATORY (INHALATION) at 18:24

## 2024-02-06 NOTE — PLAN OF CARE
Goal Outcome Evaluation:      VSS. Levo @0.02  Patient refused Bipap and breathing treatments. Patient complained of pain all shift, Hospitalist notified, one time dose of dilaudid given. Patient resting quietly after pain medicine. On 2L NC.

## 2024-02-06 NOTE — PLAN OF CARE
Goal Outcome Evaluation:  Plan of Care Reviewed With: (P) patient        Progress: (P) no change  Outcome Evaluation: (P) Pt demonstrated impaired balance, strength, and gait and presents below functional baseline. Skilled PT is warranted to address functional deficits.      Anticipated Discharge Disposition (PT): (P) sub acute care setting

## 2024-02-06 NOTE — PLAN OF CARE
Goal Outcome Evaluation:  Patient did not wear Bipap tonight. She said that she was feeling miserable tonight and could not tolerate it. She has remained on 1-2L nasal cannula which she wears at home as needed.

## 2024-02-06 NOTE — MBS/VFSS/FEES
Acute Care - Speech Language Pathology   Swallow  Modified Barium Swallow  JEMMA Go     Patient Name: Isha Llanes  : 1965  MRN: [id number]  Today's Date: 2024               Admit Date: 2024    Visit Dx:     ICD-10-CM ICD-9-CM   1. Sepsis, due to unspecified organism, unspecified whether acute organ dysfunction present  A41.9 038.9     995.91   2. Pneumonia due to infectious organism, unspecified laterality, unspecified part of lung  J18.9 486   3. Acute respiratory failure with hypoxia and hypercapnia  J96.01 518.81    J96.02    4. Difficulty walking  R26.2 719.7     Patient Active Problem List   Diagnosis    Septic shock    Acute MI    Cancer associated pain    Presence of intrathecal pump    Chronic low back pain with bilateral sciatica    Sacroiliac inflammation    Chronic pain syndrome    Pelvic pain    Aortic stenosis, severe    Dyspnea on effort    Other pulmonary embolism without acute cor pulmonale    Nonrheumatic aortic valve stenosis    Hip pain    Anxiety disorder    Allergic rhinitis due to allergen    Arthritis    Asthma    Kidney disease    CHF (congestive heart failure)    Chronic obstructive pulmonary disease    Diabetes mellitus, type 2    Depression    GERD (gastroesophageal reflux disease)    S/P TAVR (transcatheter aortic valve replacement)    Limited mobility    Venous hypertension of both lower extremities    Nicotine dependence    Mitral valve prolapse    Lupus (systemic lupus erythematosus)    Long term current use of anticoagulant therapy    History of Hodgkin's lymphoma    Hepatic steatosis    Heart murmur    CAD (coronary artery disease)    Non-healing wound of lower extremity, subsequent encounter    MSSA (methicillin susceptible Staphylococcus aureus) infection    Sepsis    Change in bowel habits    Nausea    Venous stasis ulcer of right calf    Venous stasis ulcer of left calf    Noncompliance    Peripheral vascular disease    Obesity with body mass index 30 or  greater    Neurologic disorder    Neck pain    Limb pain    Hiatal hernia    Bladder disorder    Frailty    Cellulitis of right foot    Bowel disease    Atrophy of skin    Breast lump    Encounter for care related to vascular access port    Primary osteoarthritis of right hip    Tobacco abuse    Laryngeal cancer    Throat pain    Voice hoarseness    Hodgkin lymphoma    Malignant neoplasm of supraglottis    Dysphagia     Past Medical History:   Diagnosis Date    Anesthesia     REPORTS HAS GOT REAL EMOTIONAL AND HAS BECOME ANGRY BEFORE    Anxiety     Aortic stenosis, severe     HAS BIO VALVE REPLACED    Arthritis     Asthma     Back pain     Blood clotting disorder     Cancer     Cancer associated pain     HODGKINS LYMPHOMA    CHF (congestive heart failure)     Chronic low back pain with sciatica     Chronic pain disorder     COPD (chronic obstructive pulmonary disease)     Coronary artery disease     Diabetes mellitus     TYPE 2    Diabetes mellitus     Dyspnea on effort     GERD (gastroesophageal reflux disease)     H/O lumpectomy     H/O: hysterectomy     Hiatal hernia     History of degenerative disc disease     History of kidney stones     History of pulmonary embolus (PE)     History of transfusion     AS A CHILD NO TRANSFUSION REACTION    History of transfusion     Hodgkin's lymphoma     5/19/23  5 YEARS SINCE LAST CHEMO TX    Hypertension     Immobility     Liver disease     DENIES ANY CURRENT ISSUES    Lupus     Mitral valve prolapse     Panic disorder     Pelvic pain     Peripheral neuropathy     Personal history of DVT (deep vein thrombosis)     Presence of intrathecal pump     PTSD (post-traumatic stress disorder)     Sacroiliac joint disease     SI (sacroiliac) joint inflammation     Sleep apnea     Venous insufficiency      Past Surgical History:   Procedure Laterality Date    ABDOMINAL SURGERY      BACK SURGERY      SPINAL FUSION     BACK SURGERY      BLADDER REPAIR      CARDIAC CATHETERIZATION N/A  03/06/2019    Procedure: Valvuloplasty;  Surgeon: Wayne Johnson MD;  Location: [facility] CATH INVASIVE LOCATION;  Service: Cardiology    CARDIAC CATHETERIZATION      CARDIAC CATHETERIZATION Left 5/31/2023    Procedure: Cardiac Catheterization/Vascular Study;  Surgeon: Poncho Reid MD;  Location: [facility] CATH INVASIVE LOCATION;  Service: Cardiovascular;  Laterality: Left;    CARDIAC SURGERY      CARDIAC VALVE REPLACEMENT  2021    CHOLECYSTECTOMY      COLONOSCOPY N/A 12/29/2021    Procedure: COLONOSCOPY;  Surgeon: Karine Orlando MD;  Location: [facility] ENDOSCOPY;  Service: Gastroenterology;  Laterality: N/A;  POOR PREP    COLONOSCOPY N/A 04/13/2022    Procedure: COLONOSCOPY WITH POLYPECTOMY;  Surgeon: Karine Orlando MD;  Location: [facility] ENDOSCOPY;  Service: Gastroenterology;  Laterality: N/A;  COLON POLYP, HEMORRHOIDS, POOR PREP    COLONOSCOPY      DIRECT LARYNGOSCOPY, ESOPHAGOSCOPY, BRONCHOSCOPY N/A 5/22/2023    Procedure: DIRECT LARYNGOSCOPY WITH BIOPSIES , ESOPHAGOSCOPY, BRONCHOSCOPY;  Surgeon: Ronnie Mcgarry MD;  Location: [facility] OR [facility];  Service: ENT;  Laterality: N/A;    ENDOSCOPY N/A 12/29/2021    Procedure: ESOPHAGOGASTRODUODENOSCOPY;  Surgeon: Karine Orlando MD;  Location: [facility] ENDOSCOPY;  Service: Gastroenterology;  Laterality: N/A;  ESOPHAGITIS, GASTRITIS, HIATAL HERNIA    ENDOSCOPY      HYSTERECTOMY      LYMPHADENECTOMY      PAIN PUMP INSERTION/REVISION N/A 10/18/2019    Procedure: PAIN PUMP INSERTION [facility] [place];  Surgeon: Horace Rios MD;  Location: [facility] MAIN OR;  Service: Pain Management    PAIN PUMP INSERTION/REVISION N/A 11/23/2020    Procedure: pain pump removal;  Surgeon: Horace Rios MD;  Location: [facility] MAIN OR;  Service: Pain Management;  Laterality: N/A;    PEG TUBE INSERTION N/A 7/26/2023    Procedure: PERCUTANEOUS ENDOSCOPIC GASTROSTOMY TUBE INSERTION;  Surgeon: Kody Mercer MD;  Location: [facility] ENDOSCOPY;   Service: General;  Laterality: N/A;  SUCCESSFUL PEG TUBE PLACE PLACEMENT    PORTACATH PLACEMENT      IN LEFT CHEST REPORTS THAT IT IS NOT FUNCTIONING    SKIN BIOPSY      TONSILLECTOMY      TUBAL ABDOMINAL LIGATION      TUMOR REMOVAL       MODIFIED BARIUM SWALLOW STUDY: SPEECH PATHOLOGY REPORT      PERTINENT INFORMATION:  This patient is a 58year old female admitted with the above diagnosis.  Patient with history of dysphagia with prior modified barium swallow in August showing high risk of aspiration with p.o. intake.  Patient has since had PEG removed and has been eating and drinking at home however did report difficulty swallowing at home with patient reporting nasal regurg at times..    Patient was referred for an MBSS by Dr. Navarro to rule out aspiration as well as to determine appropriate treatment plan for this patient.      PROCEDURE:    Patient was alert and cooperative.  The patient was viewed in lateral projection.  The following Ba consistencies were administered: Thin liquids, nectar thick liquids, purée consistencies and soft solids.  The following compensatory swallowing strategies were performed: Bolus size modification, chin tuck      RESULTS:    1.  Oral stage is characterized by adequate bolus preparation and control for trials presented.  Mildly slow oral transit.  Pharyngeal phase appears timely.  Reduced laryngeal elevation and sluggish epiglottic movement.  Patient with laryngeal penetration with thin liquids, deep at times nearly to the level of the cords.  Attempted chin tuck however this was not effective in minimizing aspiration risk.  Shallow penetration intermittently with nectar thick liquids.  Tolerates purée and soft solids without penetration or aspiration.  No significant pharyngeal residue noted after the swallow      IMPRESSIONS:    Patient demonstrated mild to moderate pharyngeal dysphagia characterized by deep laryngeal penetration with thin liquids nearly to the level of the  cords with single sips.  Shallow penetration with nectar thick liquids.  No aspiration noted.  No significant pharyngeal residue noted.     FUNCTIONAL DEFICIT: Patient scored level 4 of 7 on Functional Communication Measures for swallowing indicating a 40-59% limitation in function for current status, goal status, and discharge status.      RECOMMENDATIONS:   1.  Mechanical soft diet, fork mash as needed  2.  Single sips of nectar thick liquids  3.  Oral meds in applesauce crushed if needed  4.  Slow rate, small bites/drinks  5.  90 degrees upright for all intake        Yes: patient/responsible party agrees with the plan of care and has been informed of all alternatives, risks and benefits.    Thank you for this referral.    EDUCATION  The patient has been educated in the following areas:   Dysphagia (Swallowing Impairment).            Corinne Little, SLP  2/6/2024

## 2024-02-06 NOTE — PLAN OF CARE
"Goal Outcome Evaluation: Patient currently wearing 2 l/m NC throughout the day. Bipap 15/5, RR 20 during naps and at night as tolerated. Does not tolerate well. States that she \"cannot breathe\". Encourage/educated patient to wear at this time.                                               "

## 2024-02-06 NOTE — PROGRESS NOTES
Pulmonary / Critical Care Progress Note      Patient Name: Isha Llanes  : 1965  MRN: [id number]  Attending:  Virgil Navarro MD   Date of admission: 2024    Subjective   Subjective   Patient critically ill with septic shock, acute hypoxic hypercarbic respiratory failure    Over past 24 hours: Admitted to the ICU, required Levophed, and supplemental oxygen, antibiotics, blood cultures show enterobacterales species    No acute events overnight, did wean Levophed off, refused BiPAP    This morning  Patient is awake, requesting pain and anxiety medications  Complaining of constipation  Levophed is on standby  Urine output 950/24 hours  Tmax 99.3  Going for barium swallow to evaluate, Hx of PEG tube  Blood cultures 2/2 Enterobacterales species  Patient has pain pump implanted    Review of Systems  Constitutional symptoms:  Denied complaints   Ear, nose, throat: Denied complaints  Cardiovascular:  Denied complaints  Respiratory: Denied complaints  Gastrointestinal: Denied complaints  Musculoskeletal: Denied complaints  Neurologic: Denied complaints  Skin: Denied complaints        Objective   Objective     Vitals:   Vital signs for last 24 hours:  Temp:  [98.1 °F (36.7 °C)-99.1 °F (37.3 °C)] 98.6 °F (37 °C)  Heart Rate:  [] 102  Resp:  [15-24] 20  BP: ()/(46-85) 125/67    Intake/Output last 3 shifts:  I/O last 3 completed shifts:  In: 2150 [I.V.:1000; IV Piggyback:1150]  Out: 950 [Urine:950]  Intake/Output this shift:  No intake/output data recorded.    Physical Exam   Vital Signs Reviewed   WDWN, weight, alert chronically ill-appearing on nasal cannula  HEENT:  PERRL, EOMI.  OP, nares clear  Chest:  good aeration, rhonchi to auscultation bilaterally, tympanic to percussion bilaterally, no work of breathing noted  CV: RRR, no MGR, pulses 2+, equal.  Abd:  Soft, NT, ND, + BS, no HSM  EXT:  no clubbing, no cyanosis, no edema  Neuro:  A&Ox3, CN grossly intact, no focal deficits.  Skin: No  rashes or lesions noted, chronic changes of bilateral lower extremities    Result Review    Result Review:  I have personally reviewed the results from the time of this admission to 2/6/2024 10:48 EST and agree with these findings:  [x]  Laboratory  [x]  Microbiology  [x]  Radiology  []  EKG/Telemetry   []  Cardiology/Vascular   []  Pathology  []  Old records  []  Other:  Most notable findings include:   2/5/2024   Respiratory culture in process  Blood culture 2/2 BCID Enterobacterales species        Lab 02/06/24  0241 02/05/24  0758 02/05/24  0136 02/05/24  0113   WBC 5.23 12.08*  --  8.50   HEMOGLOBIN 10.6* 13.3  --  12.5   HEMATOCRIT 35.4 44.6  --  39.7   PLATELETS 97* 120*  --  150   SODIUM 139 139 138  --    POTASSIUM 4.2 4.0 4.5  --    CHLORIDE 105 105 98  --    CO2 22.8 23.0 30.2*  --    BUN 9 13 10  --    CREATININE 0.38* 0.59 0.57  --    GLUCOSE 94 115* 126*  --    CALCIUM 8.4* 8.4* 9.3  --    PHOSPHORUS 2.3*  --  4.5  --    TOTAL PROTEIN  --  6.3 7.5  --    ALBUMIN  --  3.4* 4.0  --    GLOBULIN  --  2.9 3.5  --          Assessment & Plan   Assessment / Plan     Active Hospital Problems:  Active Hospital Problems    Diagnosis     Sepsis      Impression:  Septic shock, present on admission  Enterobacter bacteremia  Pneumonia of unspecified organism  Acute hypoxic and hypercarbic respiratory failure requiring supplemental oxygen  COPD with acute exacerbation  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  History of PEG tube placement  History of non-Hodgkin's lymphoma  History of severe aortic stenosis s/p TAVR  History of MSSA cellulitis  Type 2 diabetes       Plan:  -Continue supplemental oxygen, wean for SpO2 greater than 90  -Recommend NIPPV 14/7 with naps and at night  -Continue bronchopulmonary hygiene, bronchodilator protocol  -Continue Brovana, Pulmicort and reduced DuoNeb to 3 times daily  -Aspiration precautions  -Will give lactulose x 1, bowel regimen in place  -Patient does  have numerous allergies, reviewed history with pharmacy has previously tolerated cefepime  -Blood culture 2/2 with Enterobacter species, will add cefepime discontinue fluoroquinolone  -Repeat blood cultures  -Had barium swallow, cleared per RN this morning  -Okay to advance diet per speech's recommendations  -Remains off vasopressors  -Decrease IV fluids to 75 cc, once tolerating p.o. can discontinue fluids  -Previous echocardiogram 5/2023 EF 46 to 50%, TAVR,  -Wound care following for chronic lower extremity wounds, appreciate assistance  -Patient has pain pump, primary discussing pain regimen  -Trend renal function, monitor replace electrolytes as needed    No tubes lines or drains    Okay to transfer out of ICU    DVT prophylaxis:  Medical and mechanical DVT prophylaxis orders are present.        CODE STATUS:   Code Status (Patient has no pulse and is not breathing): CPR (Attempt to Resuscitate)  Medical Interventions (Patient has pulse or is breathing): Full Support    Mira DOSHI APRN spent 15 minutes critical care time.  Electronically signed by LUZ Ritter, 02/06/24, 10:48 AM EST.    Patient is critically ill with acute hypoxic respiratory failure requiring BiPAP, septic shock on norepinephrine. 35 minutes of critical care time was spent managing this patient, excluding procedures. Of this time, Dr. Cherelle DOSHI, spent 20 minutes and Mira Andujar NP spent 15 minutes in accordance with split shared billing. This included personally reviewing all pertinent labs, imaging, microbiology and documentation. Also discussing the case with the patient and any available family, the admitting physician and any available ancillary staff.    Electronically signed by Cherelle Mitchell MD, 02/06/24, 12:40 PM EST.

## 2024-02-06 NOTE — PROGRESS NOTES
RT EQUIPMENT DEVICE RELATED - SKIN ASSESSMENT    RT Medical Equipment/Device:     Nasal Cannula    Skin Assessment:      Nose:  Intact    Device Skin Pressure Protection:  Skin-to-device areas padded:  None Required    Nurse Notification:  Zayda Almonte RRT

## 2024-02-06 NOTE — PROGRESS NOTES
RT EQUIPMENT DEVICE RELATED - SKIN ASSESSMENT    RT Medical Equipment/Device:     NIV Mask:  Under-the-nose   size:  B    Skin Assessment:      Cheek:  Intact  Chin:  Intact  Ears:  Intact  Neck:  Intact  Nose:  Intact  Mouth:  Intact    Device Skin Pressure Protection:  Positioning supports utilized    Nurse Notification:  Zayda Garibay, CRT

## 2024-02-06 NOTE — THERAPY EVALUATION
Acute Care - Physical Therapy Initial Evaluation  JEMMA Go     Patient Name: Isha Llanes  : 1965  MRN: [id number]  Today's Date: 2024      Visit Dx:     ICD-10-CM ICD-9-CM   1. Sepsis, due to unspecified organism, unspecified whether acute organ dysfunction present  A41.9 038.9     995.91   2. Pneumonia due to infectious organism, unspecified laterality, unspecified part of lung  J18.9 486   3. Acute respiratory failure with hypoxia and hypercapnia  J96.01 518.81    J96.02    4. Difficulty walking  R26.2 719.7     Patient Active Problem List   Diagnosis    Septic shock    Acute MI    Cancer associated pain    Presence of intrathecal pump    Chronic low back pain with bilateral sciatica    Sacroiliac inflammation    Chronic pain syndrome    Pelvic pain    Aortic stenosis, severe    Dyspnea on effort    Other pulmonary embolism without acute cor pulmonale    Nonrheumatic aortic valve stenosis    Hip pain    Anxiety disorder    Allergic rhinitis due to allergen    Arthritis    Asthma    Kidney disease    CHF (congestive heart failure)    Chronic obstructive pulmonary disease    Diabetes mellitus, type 2    Depression    GERD (gastroesophageal reflux disease)    S/P TAVR (transcatheter aortic valve replacement)    Limited mobility    Venous hypertension of both lower extremities    Nicotine dependence    Mitral valve prolapse    Lupus (systemic lupus erythematosus)    Long term current use of anticoagulant therapy    History of Hodgkin's lymphoma    Hepatic steatosis    Heart murmur    CAD (coronary artery disease)    Non-healing wound of lower extremity, subsequent encounter    MSSA (methicillin susceptible Staphylococcus aureus) infection    Sepsis    Change in bowel habits    Nausea    Venous stasis ulcer of right calf    Venous stasis ulcer of left calf    Noncompliance    Peripheral vascular disease    Obesity with body mass index 30 or greater    Neurologic disorder    Neck pain    Limb pain     Hiatal hernia    Bladder disorder    Frailty    Cellulitis of right foot    Bowel disease    Atrophy of skin    Breast lump    Encounter for care related to vascular access port    Primary osteoarthritis of right hip    Tobacco abuse    Laryngeal cancer    Throat pain    Voice hoarseness    Hodgkin lymphoma    Malignant neoplasm of supraglottis    Dysphagia     Past Medical History:   Diagnosis Date    Anesthesia     REPORTS HAS GOT REAL EMOTIONAL AND HAS BECOME ANGRY BEFORE    Anxiety     Aortic stenosis, severe     HAS BIO VALVE REPLACED    Arthritis     Asthma     Back pain     Blood clotting disorder     Cancer     Cancer associated pain     HODGKINS LYMPHOMA    CHF (congestive heart failure)     Chronic low back pain with sciatica     Chronic pain disorder     COPD (chronic obstructive pulmonary disease)     Coronary artery disease     Diabetes mellitus     TYPE 2    Diabetes mellitus     Dyspnea on effort     GERD (gastroesophageal reflux disease)     H/O lumpectomy     H/O: hysterectomy     Hiatal hernia     History of degenerative disc disease     History of kidney stones     History of pulmonary embolus (PE)     History of transfusion     AS A CHILD NO TRANSFUSION REACTION    History of transfusion     Hodgkin's lymphoma     5/19/23  5 YEARS SINCE LAST CHEMO TX    Hypertension     Immobility     Liver disease     DENIES ANY CURRENT ISSUES    Lupus     Mitral valve prolapse     Panic disorder     Pelvic pain     Peripheral neuropathy     Personal history of DVT (deep vein thrombosis)     Presence of intrathecal pump     PTSD (post-traumatic stress disorder)     Sacroiliac joint disease     SI (sacroiliac) joint inflammation     Sleep apnea     Venous insufficiency      Past Surgical History:   Procedure Laterality Date    ABDOMINAL SURGERY      BACK SURGERY      SPINAL FUSION     BACK SURGERY      BLADDER REPAIR      CARDIAC CATHETERIZATION N/A 03/06/2019    Procedure: Valvuloplasty;  Surgeon: Elizabeth  Wayne MOLINA MD;  Location: [facility] CATH INVASIVE LOCATION;  Service: Cardiology    CARDIAC CATHETERIZATION      CARDIAC CATHETERIZATION Left 5/31/2023    Procedure: Cardiac Catheterization/Vascular Study;  Surgeon: Poncho Reid MD;  Location: [facility] CATH INVASIVE LOCATION;  Service: Cardiovascular;  Laterality: Left;    CARDIAC SURGERY      CARDIAC VALVE REPLACEMENT  2021    CHOLECYSTECTOMY      COLONOSCOPY N/A 12/29/2021    Procedure: COLONOSCOPY;  Surgeon: Karine Orlando MD;  Location: [facility] ENDOSCOPY;  Service: Gastroenterology;  Laterality: N/A;  POOR PREP    COLONOSCOPY N/A 04/13/2022    Procedure: COLONOSCOPY WITH POLYPECTOMY;  Surgeon: Karine Orlando MD;  Location: [facility] ENDOSCOPY;  Service: Gastroenterology;  Laterality: N/A;  COLON POLYP, HEMORRHOIDS, POOR PREP    COLONOSCOPY      DIRECT LARYNGOSCOPY, ESOPHAGOSCOPY, BRONCHOSCOPY N/A 5/22/2023    Procedure: DIRECT LARYNGOSCOPY WITH BIOPSIES , ESOPHAGOSCOPY, BRONCHOSCOPY;  Surgeon: Ronnie Mcgarry MD;  Location: [facility] OR OSC;  Service: ENT;  Laterality: N/A;    ENDOSCOPY N/A 12/29/2021    Procedure: ESOPHAGOGASTRODUODENOSCOPY;  Surgeon: Karine Orlando MD;  Location: [facility] ENDOSCOPY;  Service: Gastroenterology;  Laterality: N/A;  ESOPHAGITIS, GASTRITIS, HIATAL HERNIA    ENDOSCOPY      HYSTERECTOMY      LYMPHADENECTOMY      PAIN PUMP INSERTION/REVISION N/A 10/18/2019    Procedure: PAIN PUMP INSERTION [facility] 10-18-19 [place];  Surgeon: Horace Rios MD;  Location: [facility] MAIN OR;  Service: Pain Management    PAIN PUMP INSERTION/REVISION N/A 11/23/2020    Procedure: pain pump removal;  Surgeon: Horace Rios MD;  Location: [facility] MAIN OR;  Service: Pain Management;  Laterality: N/A;    PEG TUBE INSERTION N/A 7/26/2023    Procedure: PERCUTANEOUS ENDOSCOPIC GASTROSTOMY TUBE INSERTION;  Surgeon: Kody Mercer MD;  Location: [facility] ENDOSCOPY;  Service: General;  Laterality: N/A;  SUCCESSFUL PEG TUBE PLACE  PLACEMENT    PORTACATH PLACEMENT      IN LEFT CHEST REPORTS THAT IT IS NOT FUNCTIONING    SKIN BIOPSY      TONSILLECTOMY      TUBAL ABDOMINAL LIGATION      TUMOR REMOVAL       PT Assessment (last 12 hours)       PT Evaluation and Treatment       Row Name 02/06/24 0946          Physical Therapy Time and Intention    Subjective Information complains of;weakness;fatigue;pain;dizziness;nausea/vomiting;dyspnea (P)   -WB     Document Type evaluation (P)   -WB     Mode of Treatment individual therapy;physical therapy (P)   -WB     Patient Effort adequate (P)   -WB     Symptoms Noted During/After Treatment fatigue;increased pain;nausea (P)   -WB       Row Name 02/06/24 0946          General Information    Patient Profile Reviewed yes (P)   -WB     Patient Observations alert;cooperative;agree to therapy (P)   -WB     Prior Level of Function independent:;all household mobility (P)   -WB     Equipment Currently Used at Home wheelchair (P)   -WB     Existing Precautions/Restrictions fall (P)   -WB     Barriers to Rehab medically complex (P)   -WB       Row Name 02/06/24 0946          Living Environment    Current Living Arrangements home (P)   -WB     Home Accessibility wheelchair accessible (P)   -WB     People in Home child(felix), adult (P)   -WB     Primary Care Provided by self;child(felix) (P)   -WB       Row Name 02/06/24 0946          Home Use of Assistive/Adaptive Equipment    Equipment Currently Used at Home wheelchair (P)   -WB       Row Name 02/06/24 0946          Pain    Pretreatment Pain Rating 8/10 (P)   -WB     Posttreatment Pain Rating 9/10 (P)   -WB     Pain Location - Side/Orientation Bilateral (P)   -WB     Pain Location lower (P)   -WB     Pain Location - back (P)   -WB     Pain Intervention(s) Repositioned;Ambulation/increased activity (P)   -WB       Row Name 02/06/24 0946          Range of Motion (ROM)    Range of Motion ROM is WFL (P)   -WB       Row Name 02/06/24 0946          Strength (Manual Muscle  Testing)    Strength (Manual Muscle Testing) bilateral lower extremities;other (see comments) (P)   3/5  -WB       Row Name 02/06/24 0946          Bed Mobility    Bed Mobility bed mobility (all) activities (P)   -WB     All Activities, [place] (Bed Mobility) contact guard (P)   -WB     Bed Mobility, Safety Issues decreased use of arms for pushing/pulling;decreased use of legs for bridging/pushing;impaired trunk control for bed mobility (P)   -WB     Assistive Device (Bed Mobility) bed rails;head of bed elevated (P)   -WB       Row Name 02/06/24 0946          Transfers    Transfers sit-stand transfer;stand-sit transfer (P)   -WB       Row Name 02/06/24 0946          Sit-Stand Transfer    Sit-Stand [place] (Transfers) minimum assist (75% patient effort) (P)   -WB     Assistive Device (Sit-Stand Transfers) walker, front-wheeled (P)   -WB       Row Name 02/06/24 0946          Stand-Sit Transfer    Stand-Sit [place] (Transfers) minimum assist (75% patient effort) (P)   -WB     Assistive Device (Stand-Sit Transfers) walker, front-wheeled (P)   -WB       Row Name 02/06/24 0946          Gait/Stairs (Locomotion)    Gait/Stairs Locomotion gait/ambulation assistive device (P)   -WB     [place] Level (Gait) minimum assist (75% patient effort);2 person assist (P)   -WB     Assistive Device (Gait) walker, front-wheeled (P)   -WB     Patient was able to Ambulate yes (P)   -WB     Distance in Feet (Gait) 5 (P)   -WB     Pattern (Gait) step-to (P)   -WB     Deviations/Abnormal Patterns (Gait) base of support, narrow;lexi decreased;festinating/shuffling;gait speed decreased;stride length decreased (P)   -WB     Bilateral Gait Deviations forward flexed posture;heel strike decreased (P)   -WB       Row Name 02/06/24 0946          Safety Issues, Functional Mobility    Safety Issues Affecting Function (Mobility) positioning of assistive device (P)   -WB     Impairments Affecting Function (Mobility)  balance;endurance/activity tolerance;grasp;pain;postural/trunk control;shortness of breath;strength (P)   -WB       Row Name 02/06/24 0946          Balance    Balance Assessment standing dynamic balance (P)   -WB     Dynamic Standing Balance minimal assist;2-person assist (P)   -WB     Position/Device Used, Standing Balance walker, front-wheeled (P)   -WB     Balance Interventions standing;supported;sit to stand;dynamic (P)   -WB       Row Name             Wound 04/05/22 1158 Left medial leg    Wound - Properties Group Placement Date: 04/05/22  -MAGY Placement Time: 1158  -MAGY Present on Original Admission: Y  -MAGY Side: Left  -MAGY Orientation: medial  -MAGY Location: leg  -MAGY    Retired Wound - Properties Group Placement Date: 04/05/22  -MAGY Placement Time: 1158  -MAGY Present on Original Admission: Y  -MAGY Side: Left  -MAGY Orientation: medial  -MAGY Location: leg  -MAGY    Retired Wound - Properties Group Date first assessed: 04/05/22  -MAGY Time first assessed: 1158  -MAGY Present on Original Admission: Y  -MAGY Side: Left  -MAGY Location: leg  -MAGY      Row Name             Wound 05/26/23 0140 Left calf Diabetic Ulcer    Wound - Properties Group Placement Date: 05/26/23  -PK Placement Time: 0140  -PK Side: Left  -PK Location: calf  -PK Primary Wound Type: Diabetic ulc  -PK    Retired Wound - Properties Group Placement Date: 05/26/23  -PK Placement Time: 0140  -PK Side: Left  -PK Location: calf  -PK Primary Wound Type: Diabetic ulc  -PK    Retired Wound - Properties Group Date first assessed: 05/26/23  -PK Time first assessed: 0140  -PK Side: Left  -PK Location: calf  -PK Primary Wound Type: Diabetic ulc  -PK      Row Name             Wound 05/26/23 0140 Right calf    Wound - Properties Group Placement Date: 05/26/23  -PK Placement Time: 0140  -PK Side: Right  -PK Location: calf  -PK    Retired Wound - Properties Group Placement Date: 05/26/23  -PK Placement Time: 0140  -PK Side: Right  -PK Location: calf  -PK    Retired Wound -  Properties Group Date first assessed: 05/26/23  -PK Time first assessed: 0140  -PK Side: Right  -PK Location: calf  -PK      Row Name 02/06/24 0946          Plan of Care Review    Plan of Care Reviewed With patient (P)   -WB     Progress no change (P)   -WB     Outcome Evaluation Pt demonstrated impaired balance, strength, and gait and presents below functional baseline. Skilled PT is warranted to address functional deficits. (P)   -WB       Row Name 02/06/24 0946          Positioning and Restraints    Pre-Treatment Position in bed (P)   -WB     Post Treatment Position chair (P)   -WB     In Chair reclined;notified nsg;sitting;call light within reach;encouraged to call for assist;exit alarm on (P)   -WB       Row Name 02/06/24 0946          Therapy Assessment/Plan (PT)    Rehab Potential (PT) fair, will monitor progress closely (P)   -WB     Criteria for Skilled Interventions Met (PT) yes;skilled treatment is necessary;meets criteria (P)   -WB     Therapy Frequency (PT) daily (P)   -WB     Predicted Duration of Therapy Intervention (PT) 10 days (P)   -WB     Problem List (PT) problems related to;balance;mobility;strength;pain (P)   -WB     Activity Limitations Related to Problem List (PT) unable to ambulate safely;unable to transfer safely (P)   -WB       Row Name 02/06/24 0946          Therapy Plan Review/Discharge Plan (PT)    Therapy Plan Review (PT) evaluation/treatment results reviewed (P)   -WB       Row Name 02/06/24 0946          Physical Therapy Goals    Bed Mobility Goal Selection (PT) bed mobility, PT goal 1 (P)   -WB     Transfer Goal Selection (PT) transfer, PT goal 1 (P)   -WB     Gait Training Goal Selection (PT) gait training, PT goal 1 (P)   -WB       Row Name 02/06/24 0946          Bed Mobility Goal 1 (PT)    Activity/Assistive Device (Bed Mobility Goal 1, PT) bed mobility activities, all (P)   -WB     Will Level/Cues Needed (Bed Mobility Goal 1, PT) standby assist (P)   -WB     Time Frame  (Bed Mobility Goal 1, PT) long term goal (LTG);10 days (P)   -WB       Row Name 02/06/24 0946          Transfer Goal 1 (PT)    Activity/Assistive Device (Transfer Goal 1, PT) sit-to-stand/stand-to-sit (P)   -WB     [place] Level/Cues Needed (Transfer Goal 1, PT) contact guard required (P)   -WB     Time Frame (Transfer Goal 1, PT) long term goal (LTG);10 days (P)   -WB       Row Name 02/06/24 0946          Gait Training Goal 1 (PT)    Activity/Assistive Device (Gait Training Goal 1, PT) gait (walking locomotion);assistive device use;walker, rolling (P)   -WB     [place] Level (Gait Training Goal 1, PT) contact guard required (P)   -WB     Distance (Gait Training Goal 1, PT) 50 (P)   -WB     Time Frame (Gait Training Goal 1, PT) long term goal (LTG);10 days (P)   -WB               User Key  (r) = Recorded By, (t) = Taken By, (c) = Cosigned By      Initials Name Provider Type    MAGY Siena Oro, RN Registered Nurse    Sudha Leonard RN Registered Nurse    WB Goyo Hines, PT Student PT Student                    Physical Therapy Education       Title: PT OT SLP Therapies (Done)       Topic: Physical Therapy (Done)       Point: Mobility training (Done)       Learning Progress Summary             Patient Acceptance, E,TB, VU by WB at 2/6/2024 1000                         Point: Body mechanics (Done)       Learning Progress Summary             Patient Acceptance, E,TB, VU by WB at 2/6/2024 1000                         Point: Precautions (Done)       Learning Progress Summary             Patient Acceptance, E,TB, VU by WB at 2/6/2024 1000                                         User Key       Initials Effective Dates Name Provider Type Discipline    WB 01/09/24 -  Goyo Hines, PT Student PT Student PT                  PT Recommendation and Plan  Anticipated Discharge Disposition (PT): (P) sub acute care setting  Planned Therapy Interventions (PT): (P) balance training, bed mobility training, gait  training, neuromuscular re-education, ROM (range of motion), strengthening, stretching, transfer training  Therapy Frequency (PT): (P) daily  Plan of Care Reviewed With: (P) patient  Progress: (P) no change  Outcome Evaluation: (P) Pt demonstrated impaired balance, strength, and gait and presents below functional baseline. Skilled PT is warranted to address functional deficits.   Outcome Measures       Row Name 02/06/24 0900             How much help from another person do you currently need...    Turning from your back to your side while in flat bed without using bedrails? 3 (P)   -WB      Moving from lying on back to sitting on the side of a flat bed without bedrails? 3 (P)   -WB      Moving to and from a bed to a chair (including a wheelchair)? 3 (P)   -WB      Standing up from a chair using your arms (e.g., wheelchair, bedside chair)? 3 (P)   -WB      Climbing 3-5 steps with a railing? 1 (P)   -WB      To walk in hospital room? 2 (P)   -WB      AM-PAC 6 Clicks Score (PT) 15 (P)   -WB      Highest Level of Mobility Goal 4 --> Transfer to chair/commode (P)   -WB         Functional Assessment    Outcome Measure Options AM-PAC 6 Clicks Basic Mobility (PT) (P)   -WB                User Key  (r) = Recorded By, (t) = Taken By, (c) = Cosigned By      Initials Name Provider Type    Goyo Jenkins, PT Student PT Student                     Time Calculation:    PT Charges       Row Name 02/06/24 0947             Time Calculation    PT Received On 02/06/24 (P)   -WB      PT Goal Re-Cert Due Date 02/15/24 (P)   -WB         Untimed Charges    PT Eval/Re-eval Minutes 35 (P)   -WB         Total Minutes    Untimed Charges Total Minutes 35 (P)   -WB       Total Minutes 35 (P)   -WB                User Key  (r) = Recorded By, (t) = Taken By, (c) = Cosigned By      Initials Name Provider Type    Goyo Jenkins, PT Student PT Student                  Therapy Charges for Today       Code Description Service Date Service  Provider Modifiers Qty    [id number] HC PT EVAL MOD COMPLEXITY 3 2/6/2024 Goyo Hines, PT Student GP 1            PT G-Codes  Outcome Measure Options: (P) AM-PAC 6 Clicks Basic Mobility (PT)  AM-PAC 6 Clicks Score (PT): (P) 15    Goyo Hines, PT Student  2/6/2024

## 2024-02-06 NOTE — PROGRESS NOTES
[facility]   Hospitalist Progress Note  Date: 2024  Patient Name: Isha Llanes  : 1965  MRN: [id number]  Date of admission: 2024  Consultants:   -Pulmonology: Dr. Cherelle Mitchell    Subjective   Subjective     Chief Complaint: Altered mental status    Summary:   Isha Llanes is a 58 y.o. 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  Patient was found to be febrile and hypotensive and hypoxic.  Pro-Ge was noted to be elevated on labs.  CT abdomen pelvis showed possible bibasilar pneumonia.  Hospitalist service contacted for further evaluation and management.  Patient started on antibiotic treatment, Levophed and admitted to ICU level of care.  Pulmonology consulted.  Patient placed on BiPAP.  Patient's condition improved and patient able to wean off of Levophed and transition to nasal cannula.    Interval Followup:   No acute issues overnight.  Patient has been able to wean to nasal cannula.  Patient not on Levophed and blood pressure has been stable.  She denies any chest pain.  Endorsed anxiety.  Initial blood cultures have returned positive (2/2) for Enterobacterales species.  Nursing with no additional acute issues to report    Antibiotics:   -Levofloxacin    Objective   Objective     Vitals:   Temp:  [98.1 °F (36.7 °C)-99.1 °F (37.3 °C)] 98.6 °F (37 °C)  Heart Rate:  [] 102  Resp:  [15-24] 20  BP: ()/(46-85) 125/67  Flow (L/min):  [0.5-2] 2  Physical Exam   Gen: Chronically ill-appearing female, appears older than stated age, sitting up in chair bedside, conversant  Resp: Bilateral rhonchi, equal chest rise bilaterally, no increased work of breathing  Card: Regular rhythm, tachycardic, No  m/r/g  Abd: Soft, Nontender, Nondistended, + bowel sounds    Result Review    Result Review:  I have personally reviewed the results as below and agree with these findings:  []  Laboratory:   CMP          11/29/2023    14:01 2/5/2024    01:36 2/5/2024    07:58 2/6/2024    02:41   CMP   Glucose 98  126  115  94    BUN 12  10  13  9    Creatinine 0.46  0.57  0.59  0.38    EGFR 111.8  105.5  104.6  116.3    Sodium 139  138  139  139    Potassium 4.2  4.5  4.0  4.2    Chloride 98  98  105  105    Calcium 9.6  9.3  8.4  8.4    Total Protein 7.7  7.5  6.3     Albumin 4.9  4.0  3.4     Globulin 2.8  3.5  2.9     Total Bilirubin <0.2  0.4  0.6     Alkaline Phosphatase 86  80  119     AST (SGOT) 11  15  22     ALT (SGPT) 7  9  12     Albumin/Globulin Ratio 1.8  1.1  1.2     BUN/Creatinine Ratio 26.1  17.5  22.0  23.7    Anion Gap 12.1  9.8  11.0  11.2      CBC          11/29/2023    14:01 2/5/2024    01:13 2/5/2024    07:58 2/6/2024    02:41   CBC   WBC 3.89  8.50  12.08  5.23    RBC 5.06  4.46  4.78  3.86    Hemoglobin 14.0  12.5  13.3  10.6    Hematocrit 44.7  39.7  44.6  35.4    MCV 88.3  89.0  93.3  91.7    MCH 27.7  28.0  27.8  27.5    MCHC 31.3  31.5  29.8  29.9    RDW 15.9  14.6  14.7  14.7    Platelets 166  150  120  97    Phosphorus low.  Magnesium within normal limits.  [x]  Microbiology: Blood culture (02/05/2024): Enterobacterales.  []  Radiology:   [x]  EKG/Telemetry:    []  Cardiology/Vascular:    []  Pathology:  []  Old records:  []  Other:    Assessment & Plan   Assessment / Plan     Assessment:  Community-acquired pneumonia due to unknown organism, present on admission  Enterobacterales bacteremia  Septic shock secondary to above, present admission  Acute hypoxic and hypercarbic respiratory failure  Acute COPD exacerbation  Hypophosphatemia, new  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  History of non-Hodgkin's lymphoma  Type 2 diabetes mellitus  History of severe aortic stenosis  s/p TAVR    Plan:  -Pulmonology consulted and following, appreciate assistance and recommendations in the care of this patient.  -Continue supplemental O2 to maintain sats greater than 90%, wean as tolerated  -BiPAP available with naps and as needed  -Continue Brovana, Pulmicort and DuoNebs  -Continue Levaquin.  Tailor based on results of infectious workup.  Patient does have significant antibiotic allergy list.  -Repeat blood cultures ordered  -Speech therapy following.  Advance diet accordingly.  Modified barium swallow to be done today (02/06/2024)  -Wound care consulted  -Replace phosphorus IV  -Continue IV fluids  -Remove Keating catheter  -Start home Xanax for anxiety  -Monitor electrolytes and renal function with BMP, phosphorus level and magnesium level in the AM  -Monitor WBC and Hgb with CBC in the AM  -Clinical course will dictate further management     DVT Prophylaxis: Heparin  Diet: NPO  Dispo: If patient's blood pressure remains stable patient can likely transfer to monitored bed  Code Status: Full code     Time spent personally reviewing patient's chart, labs and imaging, evaluating/examining the patient, discussing care plan with patient and nurse at bedside and discussing management with consultants (Pulmonology): 51 minutes.     Part of this note may be an electronic transcription/translation of spoken language to printed text using the Dragon dictation system.    DVT prophylaxis:  Medical and mechanical DVT prophylaxis orders are present.        CODE STATUS:   Code Status (Patient has no pulse and is not breathing): CPR (Attempt to Resuscitate)  Medical Interventions (Patient has pulse or is breathing): Full Support      Electronically signed by Virgil Navarro MD, 02/06/24, 9:58 AM EST.

## 2024-02-07 ENCOUNTER — APPOINTMENT (OUTPATIENT)
Dept: GENERAL RADIOLOGY | Facility: HOSPITAL | Age: 59
DRG: 871 | End: 2024-02-07
Payer: COMMERCIAL

## 2024-02-07 LAB
ANION GAP SERPL CALCULATED.3IONS-SCNC: 8.8 MMOL/L (ref 5–15)
ARTERIAL PATENCY WRIST A: ABNORMAL
BACTERIA SPEC RESP CULT: NORMAL
BASE EXCESS BLDA CALC-SCNC: 2.3 MMOL/L (ref -2–2)
BDY SITE: ABNORMAL
BUN SERPL-MCNC: 6 MG/DL (ref 6–20)
BUN/CREAT SERPL: 14.6 (ref 7–25)
CA-I BLDA-SCNC: 1.2 MMOL/L (ref 1.13–1.32)
CALCIUM SPEC-SCNC: 8.3 MG/DL (ref 8.6–10.5)
CHLORIDE BLDA-SCNC: 104 MMOL/L (ref 98–106)
CHLORIDE SERPL-SCNC: 103 MMOL/L (ref 98–107)
CO2 SERPL-SCNC: 26.2 MMOL/L (ref 22–29)
COHGB MFR BLD: 0.6 % (ref 0–1.5)
CREAT SERPL-MCNC: 0.41 MG/DL (ref 0.57–1)
DEPRECATED RDW RBC AUTO: 47.8 FL (ref 37–54)
EGFRCR SERPLBLD CKD-EPI 2021: 114.2 ML/MIN/1.73
ERYTHROCYTE [DISTWIDTH] IN BLOOD BY AUTOMATED COUNT: 14.6 % (ref 12.3–15.4)
FHHB: 3 % (ref 0–5)
GAS FLOW AIRWAY: 2 LPM
GLUCOSE BLDA-MCNC: 127 MG/DL (ref 65–99)
GLUCOSE SERPL-MCNC: 137 MG/DL (ref 65–99)
GRAM STN SPEC: NORMAL
HCO3 BLDA-SCNC: 28.5 MMOL/L (ref 22–26)
HCT VFR BLD AUTO: 35.1 % (ref 34–46.6)
HGB BLD-MCNC: 10.8 G/DL (ref 12–15.9)
HGB BLDA-MCNC: 11.7 G/DL (ref 11.7–14.6)
INHALED O2 CONCENTRATION: 28 %
LACTATE BLDA-SCNC: 0.81 MMOL/L (ref 0.5–2)
MAGNESIUM SERPL-MCNC: 1.8 MG/DL (ref 1.6–2.6)
MCH RBC QN AUTO: 27.7 PG (ref 26.6–33)
MCHC RBC AUTO-ENTMCNC: 30.8 G/DL (ref 31.5–35.7)
MCV RBC AUTO: 90 FL (ref 79–97)
METHGB BLD QL: 0.2 % (ref 0–1.5)
MODALITY: ABNORMAL
OXYHGB MFR BLDV: 96.2 % (ref 94–99)
PCO2 BLDA: 51.5 MM HG (ref 35–45)
PH BLDA: 7.36 PH UNITS (ref 7.35–7.45)
PHOSPHATE SERPL-MCNC: 2.6 MG/DL (ref 2.5–4.5)
PLATELET # BLD AUTO: 98 10*3/MM3 (ref 140–450)
PMV BLD AUTO: 10.3 FL (ref 6–12)
PO2 BLD: 340 MM[HG] (ref 0–500)
PO2 BLDA: 95.2 MM HG (ref 80–100)
POTASSIUM BLDA-SCNC: 3.92 MMOL/L (ref 3.5–5)
POTASSIUM SERPL-SCNC: 3.8 MMOL/L (ref 3.5–5.2)
RBC # BLD AUTO: 3.9 10*6/MM3 (ref 3.77–5.28)
SAO2 % BLDCOA: 97 % (ref 95–99)
SODIUM BLDA-SCNC: 138 MMOL/L (ref 136–146)
SODIUM SERPL-SCNC: 138 MMOL/L (ref 136–145)
WBC NRBC COR # BLD AUTO: 3.95 10*3/MM3 (ref 3.4–10.8)

## 2024-02-07 PROCEDURE — 99291 CRITICAL CARE FIRST HOUR: CPT | Performed by: STUDENT IN AN ORGANIZED HEALTH CARE EDUCATION/TRAINING PROGRAM

## 2024-02-07 PROCEDURE — 94799 UNLISTED PULMONARY SVC/PX: CPT

## 2024-02-07 PROCEDURE — 74018 RADEX ABDOMEN 1 VIEW: CPT

## 2024-02-07 PROCEDURE — 83735 ASSAY OF MAGNESIUM: CPT | Performed by: INTERNAL MEDICINE

## 2024-02-07 PROCEDURE — 84100 ASSAY OF PHOSPHORUS: CPT | Performed by: INTERNAL MEDICINE

## 2024-02-07 PROCEDURE — 99291 CRITICAL CARE FIRST HOUR: CPT | Performed by: INTERNAL MEDICINE

## 2024-02-07 PROCEDURE — 83050 HGB METHEMOGLOBIN QUAN: CPT | Performed by: STUDENT IN AN ORGANIZED HEALTH CARE EDUCATION/TRAINING PROGRAM

## 2024-02-07 PROCEDURE — 82375 ASSAY CARBOXYHB QUANT: CPT | Performed by: STUDENT IN AN ORGANIZED HEALTH CARE EDUCATION/TRAINING PROGRAM

## 2024-02-07 PROCEDURE — 97165 OT EVAL LOW COMPLEX 30 MIN: CPT

## 2024-02-07 PROCEDURE — 25010000002 HEPARIN (PORCINE) PER 1000 UNITS: Performed by: NURSE PRACTITIONER

## 2024-02-07 PROCEDURE — 82805 BLOOD GASES W/O2 SATURATION: CPT | Performed by: STUDENT IN AN ORGANIZED HEALTH CARE EDUCATION/TRAINING PROGRAM

## 2024-02-07 PROCEDURE — 36600 WITHDRAWAL OF ARTERIAL BLOOD: CPT | Performed by: STUDENT IN AN ORGANIZED HEALTH CARE EDUCATION/TRAINING PROGRAM

## 2024-02-07 PROCEDURE — 25010000002 ONDANSETRON PER 1 MG: Performed by: FAMILY MEDICINE

## 2024-02-07 PROCEDURE — 25010000002 CEFEPIME PER 500 MG: Performed by: NURSE PRACTITIONER

## 2024-02-07 PROCEDURE — 85027 COMPLETE CBC AUTOMATED: CPT | Performed by: INTERNAL MEDICINE

## 2024-02-07 PROCEDURE — 80048 BASIC METABOLIC PNL TOTAL CA: CPT | Performed by: INTERNAL MEDICINE

## 2024-02-07 PROCEDURE — 94664 DEMO&/EVAL PT USE INHALER: CPT

## 2024-02-07 RX ORDER — MIDODRINE HYDROCHLORIDE 10 MG/1
10 TABLET ORAL
Status: DISCONTINUED | OUTPATIENT
Start: 2024-02-07 | End: 2024-02-09 | Stop reason: HOSPADM

## 2024-02-07 RX ORDER — LIDOCAINE 4 G/G
1 PATCH TOPICAL
Status: DISCONTINUED | OUTPATIENT
Start: 2024-02-07 | End: 2024-02-09 | Stop reason: HOSPADM

## 2024-02-07 RX ORDER — ACETAMINOPHEN 500 MG
1000 TABLET ORAL 3 TIMES DAILY
Status: DISCONTINUED | OUTPATIENT
Start: 2024-02-07 | End: 2024-02-09 | Stop reason: HOSPADM

## 2024-02-07 RX ORDER — PANTOPRAZOLE SODIUM 40 MG/1
40 TABLET, DELAYED RELEASE ORAL
Status: DISCONTINUED | OUTPATIENT
Start: 2024-02-07 | End: 2024-02-09 | Stop reason: HOSPADM

## 2024-02-07 RX ORDER — CALCIUM CARBONATE 500 MG/1
2 TABLET, CHEWABLE ORAL 3 TIMES DAILY PRN
Status: DISCONTINUED | OUTPATIENT
Start: 2024-02-07 | End: 2024-02-09 | Stop reason: HOSPADM

## 2024-02-07 RX ORDER — MIDODRINE HYDROCHLORIDE 5 MG/1
5 TABLET ORAL
Status: DISCONTINUED | OUTPATIENT
Start: 2024-02-07 | End: 2024-02-07

## 2024-02-07 RX ADMIN — PANTOPRAZOLE SODIUM 40 MG: 40 TABLET, DELAYED RELEASE ORAL at 12:14

## 2024-02-07 RX ADMIN — Medication 10 ML: at 20:29

## 2024-02-07 RX ADMIN — ONDANSETRON 4 MG: 2 INJECTION INTRAMUSCULAR; INTRAVENOUS at 21:12

## 2024-02-07 RX ADMIN — IPRATROPIUM BROMIDE AND ALBUTEROL SULFATE 3 ML: .5; 3 SOLUTION RESPIRATORY (INHALATION) at 00:36

## 2024-02-07 RX ADMIN — CEFEPIME 2000 MG: 2 INJECTION, POWDER, FOR SOLUTION INTRAVENOUS at 12:14

## 2024-02-07 RX ADMIN — IPRATROPIUM BROMIDE AND ALBUTEROL SULFATE 3 ML: .5; 3 SOLUTION RESPIRATORY (INHALATION) at 18:31

## 2024-02-07 RX ADMIN — IPRATROPIUM BROMIDE AND ALBUTEROL SULFATE 3 ML: .5; 3 SOLUTION RESPIRATORY (INHALATION) at 13:48

## 2024-02-07 RX ADMIN — BUDESONIDE 0.5 MG: 0.5 SUSPENSION RESPIRATORY (INHALATION) at 18:31

## 2024-02-07 RX ADMIN — CLOPIDOGREL BISULFATE 75 MG: 75 TABLET, FILM COATED ORAL at 08:22

## 2024-02-07 RX ADMIN — CALCIUM CARBONATE 2 TABLET: 500 TABLET, CHEWABLE ORAL at 20:51

## 2024-02-07 RX ADMIN — ARFORMOTEROL TARTRATE 15 MCG: 15 SOLUTION RESPIRATORY (INHALATION) at 18:31

## 2024-02-07 RX ADMIN — CEFEPIME 2000 MG: 2 INJECTION, POWDER, FOR SOLUTION INTRAVENOUS at 19:32

## 2024-02-07 RX ADMIN — MIDODRINE HYDROCHLORIDE 5 MG: 5 TABLET ORAL at 12:14

## 2024-02-07 RX ADMIN — ACETAMINOPHEN 1000 MG: 500 TABLET ORAL at 20:51

## 2024-02-07 RX ADMIN — BUDESONIDE 0.5 MG: 0.5 SUSPENSION RESPIRATORY (INHALATION) at 06:38

## 2024-02-07 RX ADMIN — CEFEPIME 2000 MG: 2 INJECTION, POWDER, FOR SOLUTION INTRAVENOUS at 03:50

## 2024-02-07 RX ADMIN — IPRATROPIUM BROMIDE AND ALBUTEROL SULFATE 3 ML: .5; 3 SOLUTION RESPIRATORY (INHALATION) at 06:38

## 2024-02-07 RX ADMIN — ACETAMINOPHEN 1000 MG: 500 TABLET ORAL at 14:08

## 2024-02-07 RX ADMIN — MIDODRINE HYDROCHLORIDE 10 MG: 10 TABLET ORAL at 17:23

## 2024-02-07 RX ADMIN — CALCIUM CARBONATE 2 TABLET: 500 TABLET, CHEWABLE ORAL at 17:39

## 2024-02-07 RX ADMIN — ACETAMINOPHEN 1000 MG: 500 TABLET ORAL at 10:22

## 2024-02-07 RX ADMIN — HEPARIN SODIUM 5000 UNITS: 5000 INJECTION INTRAVENOUS; SUBCUTANEOUS at 05:21

## 2024-02-07 RX ADMIN — NICOTINE 1 PATCH: 21 PATCH, EXTENDED RELEASE TRANSDERMAL at 08:22

## 2024-02-07 RX ADMIN — ACETAMINOPHEN 975 MG: 325 TABLET ORAL at 05:41

## 2024-02-07 RX ADMIN — LIDOCAINE 1 PATCH: 4 PATCH TOPICAL at 10:22

## 2024-02-07 RX ADMIN — ARFORMOTEROL TARTRATE 15 MCG: 15 SOLUTION RESPIRATORY (INHALATION) at 06:38

## 2024-02-07 NOTE — PROGRESS NOTES
Respiratory Therapist Broncho-Pulmonary Hygiene Progress Note      Patient Name:  Isha Llanes  YOB: 1965    Isha Llanes meets the qualification for Level 1 of the Bronco-Pulmonary Hygiene Protocol. This was based on my daily patient assessment and includes review of chest x-ray results, cough ability and quality, oxygenation, secretions or risk for secretion development and patient mobility.     Broncho-Pulmonary Hygiene Assessment:    Level of Movement: Actively changing positions without assistance  Alert/ oriented/ cooperative    Breath Sounds: Diminished and/or coarse rhonchi    Cough: Strong, effective    Chest X-Ray: Possible signs of consolidation and/or atelectasis or clear.     Sputum Productions: None or small amount of thin or watery secretions with effective cough    History and Physical: Chronic condition    SpO2 to Oxygen Need: greater than 92% on room air or  less than 3L nasal canula    Current SpO2 is: 97% on 2L    Based on this information, I have completed the following interventions: Teach/Instruct patient on cough and deep breathe      Electronically signed by Deepa Houser RRT, 02/07/24, 6:52 AM EST.

## 2024-02-07 NOTE — PROGRESS NOTES
RT EQUIPMENT DEVICE RELATED - SKIN ASSESSMENT    RT Medical Equipment/Device:     Nasal Cannula    Skin Assessment:      Cheek:  Intact  Ears:  Intact  Nares:  Intact    Device Skin Pressure Protection:  Skin-to-device areas padded:  None Required    Nurse Notification:  Zayda Rojsa, RRT

## 2024-02-07 NOTE — PLAN OF CARE
Goal Outcome Evaluation:  Plan of Care Reviewed With: patient, daughter        Progress: no change  Outcome Evaluation: Patient presents with balance and endurance limitations that impede his/her ability to perform ADLS. The skills of a therapist are necessary to maximize independence with ADLs.      Anticipated Discharge Disposition (OT): home with assist, home with home health

## 2024-02-07 NOTE — NURSING NOTE
Pt given PRN xanax during day shift, became lethargic and hypotensive. Transfer orders cancelled. Pt to restart levophed, hold xanax tonight per Dr. Stout.

## 2024-02-07 NOTE — PROGRESS NOTES
[facility]   Hospitalist Progress Note  Date: 2024  Patient Name: Isha Llanes  : 1965  MRN: [id number]  Date of admission: 2024  Consultants:   -Pulmonology: Dr. Cherelle Mitchell    Subjective   Subjective     Chief Complaint: Altered mental status    Summary:   Isha Llanes is a 58 y.o. 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  Patient was found to be febrile and hypotensive and hypoxic.  Pro-Ge was noted to be elevated on labs.  CT abdomen pelvis showed possible bibasilar pneumonia.  Hospitalist service contacted for further evaluation and management.  Patient started on antibiotic treatment, Levophed and admitted to ICU level of care.  Pulmonology consulted.  Patient placed on BiPAP.  Patient's condition improved and patient able to wean off of Levophed and transition to nasal cannula.  Patient's home meds and exposures found and patient had decreasing level of consciousness and increased supplemental O2 requirements so patient remained in ICU level of care.  Patient had to be placed back on Levophed as well.    Interval Followup:   Patient back on Levophed this morning.  Denies any chest pain.  States shortness of breath was improving.  Patient with complaints of chronic ongoing pain and anxiety.  Patient states is difficult for her to get comfortable in bed.  Patient is frustrated that additional pain medications have not been ordered, it was explained to her that this is because of her low blood pressures.    Antibiotics:   -Cefepime    Objective   Objective     Vitals:   Temp:  [97.4 °F (36.3 °C)-98.8 °F (37.1 °C)] 97.4 °F (36.3 °C)  Heart Rate:  [] 73  Resp:  [16-24] 18  BP: ()/(42-90)  93/61  Flow (L/min):  [2] 2  Physical Exam   Gen: Chronically ill-appearing female, sitting up in bed, alert, appears older than stated age  Resp: Bilateral rhonchi improved, normal respiratory effort, equal chest rise bilaterally  Card: RRR, No m/r/g  Abd: Soft, Nontender, Nondistended, + bowel sounds    Result Review    Result Review:  I have personally reviewed the results as below and agree with these findings:  []  Laboratory:   CMP          2/5/2024    01:36 2/5/2024    07:58 2/6/2024    02:41 2/7/2024    02:54 2/7/2024    06:45   CMP   Glucose 126  115  94  137  127    BUN 10  13  9  6     Creatinine 0.57  0.59  0.38  0.41     EGFR 105.5  104.6  116.3  114.2     Sodium 138  139  139  138  138.0    Potassium 4.5  4.0  4.2  3.8     Chloride 98  105  105  103     Calcium 9.3  8.4  8.4  8.3     Total Protein 7.5  6.3       Albumin 4.0  3.4       Globulin 3.5  2.9       Total Bilirubin 0.4  0.6       Alkaline Phosphatase 80  119       AST (SGOT) 15  22       ALT (SGPT) 9  12       Albumin/Globulin Ratio 1.1  1.2       BUN/Creatinine Ratio 17.5  22.0  23.7  14.6     Anion Gap 9.8  11.0  11.2  8.8       CBC          2/5/2024    01:13 2/5/2024    07:58 2/6/2024    02:41 2/7/2024    02:54   CBC   WBC 8.50  12.08  5.23  3.95    RBC 4.46  4.78  3.86  3.90    Hemoglobin 12.5  13.3  10.6  10.8    Hematocrit 39.7  44.6  35.4  35.1    MCV 89.0  93.3  91.7  90.0    MCH 28.0  27.8  27.5  27.7    MCHC 31.5  29.8  29.9  30.8    RDW 14.6  14.7  14.7  14.6    Platelets 150  120  97  98    Magnesium and phosphorus within normal limits.  [x]  Microbiology: Blood culture (02/05/2024): Enterobacterales.  Blood culture (02/06/2024): Pending.  []  Radiology:   [x]  EKG/Telemetry:    []  Cardiology/Vascular:    []  Pathology:  []  Old records:  []  Other:    Assessment & Plan   Assessment / Plan     Assessment:  Community-acquired pneumonia due to unknown organism, present on admission  Enterobacterales bacteremia  Septic shock  secondary to above, present admission  Acute hypoxic and hypercarbic respiratory failure  Acute COPD exacerbation  Hypophosphatemia, improved  Chronic pain  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  History of non-Hodgkin's lymphoma  Type 2 diabetes mellitus  History of severe aortic stenosis s/p TAVR    Plan:  -Pulmonology consulted and following, appreciate assistance and recommendations in the care of this patient.  -Continue supplemental O2 to maintain sats greater than 90%, wean as tolerated  -BiPAP available with naps and as needed  -Continue Brovana, Pulmicort and DuoNebs  -Patient has had numerous allergies.-On Levaquin.  After discussion with pharmacy patient has patient tolerated cefepime so we will transition patient to cefepime.  -Follow-up results of repeat blood cultures  -Continue Levophed to maintain MAP greater than 65, wean as tolerated  -Start midodrine  -Stop IV fluids.  Patient with good oral intake.  -Stop home Xanax for anxiety as patient had decreased level responsiveness yesterday after receiving.  -Had extensive discussion with patient regarding pain control during hospitalization.  Informed patient that due to patient's low blood pressure and requiring Levophed additional narcotic pain medication in addition to patient's chronic pain pump not advisable at this time.  Also given patient's decreased level of responsiveness after receiving home Xanax we will hold that medication as well.  Will start acetaminophen 1000 mg 3 times daily and lidocaine patch as well.  Patient voiced understanding in regards to plan and reasoning.  -Management discussed with pulmonology.  Agree with continuing bronchopulmonary and DuoNebs.  -Will monitor electrolytes and renal function with BMP, phosphorus level and magnesium level in the AM  -Will monitor WBC and Hgb with CBC in the AM  -Clinical course will dictate further management     DVT Prophylaxis: Heparin  Diet: Regular  Dispo:  If patient's blood pressure remains stable patient can likely transfer to monitored bed  Code Status: Full code     Pt is critically ill due to community-acquired pneumonia due to unknown organism, Enterobacter allergies bacteremia, septic shock, acute hypoxic and hypercarbic respiratory failure, acute COPD exacerbation, type 2 diabetes mellitus.  Critical care time spent in direct patient care: 45 minutes.  This included high complexity decision making to assess, manipulate and support vital organ system failure in this individual who has impairment of one or more vital organ systems such that there is a high probability of imminent or life threatening deterioration in the patient's condition.  Patient's management was discussed with the patient, her nurse at bedside and the following consultants: Pulmonology.     Part of this note may be an electronic transcription/translation of spoken language to printed text using the Dragon dictation system.    DVT prophylaxis:  Medical and mechanical DVT prophylaxis orders are present.        CODE STATUS:   Code Status (Patient has no pulse and is not breathing): CPR (Attempt to Resuscitate)  Medical Interventions (Patient has pulse or is breathing): Full Support      Electronically signed by Virgil Navaror MD, 02/07/24, 8:49 AM EST.

## 2024-02-07 NOTE — PLAN OF CARE
Goal Outcome Evaluation:               Transferred to ICU overflow. Requests new provider. Frequent requests for pain and anxiety medication. Refuses to wear bipap. Episode of unresponsiveness post xanax administration with subsequent hypotension resulting in delay of transfer and temporary hold on sedating medications. Now on levo at 0.06 with IV ABX.

## 2024-02-07 NOTE — PROGRESS NOTES
Pulmonary / Critical Care Progress Note      Patient Name: Isha Llanes  : 1965  MRN: [id number]  Attending:  Virgil Navarro MD   Date of admission: 2024    Subjective   Subjective   Patient critically ill with septic shock, acute hypoxic hypercarbic respiratory failure    Doing well this morning  No acute events overnight  Norepinephrine at 0.03  Tolerating breakfast, NS at 75  Currently on room air  Good urine output  Reports abdominal pain    Review of Systems  Constitutional symptoms: + General pain, denied complaints   Ear, nose, throat: Denied complaints  Cardiovascular:  Denied complaints  Respiratory: Denied complaints  Gastrointestinal: + Abdominal pain, denied complaints  Musculoskeletal: Denied complaints  Neurologic: Denied complaints  Skin: Denied complaints        Objective   Objective     Vitals:   Vital signs for last 24 hours:  Temp:  [97.4 °F (36.3 °C)-98.8 °F (37.1 °C)] 98 °F (36.7 °C)  Heart Rate:  [] 66  Resp:  [16-22] 18  BP: ()/() 106/74    Intake/Output last 3 shifts:  I/O last 3 completed shifts:  In: 4501 [I.V.:4501]  Out: 2100 [Urine:2100]  Intake/Output this shift:  No intake/output data recorded.    Physical Exam   Vital Signs Reviewed   WDWN, weight, alert chronically ill-appearing  HEENT:  PERRL, EOMI.  OP, nares clear  Chest:  good aeration, rhonchi to auscultation bilaterally, tympanic to percussion bilaterally, no work of breathing noted on room air  CV: RRR, no MGR, pulses 2+, equal.  Abd:  Soft, NT, ND, + BS, no HSM  EXT:  no clubbing, no cyanosis, no edema  Neuro:  A&Ox3, CN grossly intact, no focal deficits.  Skin: No rashes or lesions noted, chronic changes of bilateral lower extremities    Result Review    Result Review:  I have personally reviewed the results from the time of this admission to 2024 15:03 EST and agree with these findings:  [x]  Laboratory  [x]  Microbiology  [x]  Radiology  []  EKG/Telemetry   []  Cardiology/Vascular    []  Pathology  []  Old records  []  Other:  Most notable findings include:   2/5/2024   Respiratory culture in process  Blood culture 2/2 BCID Enterobacterales species        Lab 02/07/24  0645 02/07/24  0254 02/06/24  0241 02/05/24  0758 02/05/24  0136 02/05/24  0113   WBC  --  3.95 5.23 12.08*  --  8.50   HEMOGLOBIN  --  10.8* 10.6* 13.3  --  12.5   HEMATOCRIT  --  35.1 35.4 44.6  --  39.7   PLATELETS  --  98* 97* 120*  --  150   SODIUM  --  138 139 139 138  --    SODIUM, ARTERIAL 138.0  --   --   --   --   --    POTASSIUM  --  3.8 4.2 4.0 4.5  --    CHLORIDE  --  103 105 105 98  --    CO2  --  26.2 22.8 23.0 30.2*  --    BUN  --  6 9 13 10  --    CREATININE  --  0.41* 0.38* 0.59 0.57  --    GLUCOSE  --  137* 94 115* 126*  --    GLUCOSE, ARTERIAL 127*  --   --   --   --   --    CALCIUM  --  8.3* 8.4* 8.4* 9.3  --    PHOSPHORUS  --  2.6 2.3*  --  4.5  --    TOTAL PROTEIN  --   --   --  6.3 7.5  --    ALBUMIN  --   --   --  3.4* 4.0  --    GLOBULIN  --   --   --  2.9 3.5  --          Assessment & Plan   Assessment / Plan     Active Hospital Problems:  Active Hospital Problems    Diagnosis    • Sepsis      Impression:  Septic shock, present on admission  Enterobacter bacteremia  Pneumonia of unspecified organism  Acute hypoxic and hypercarbic respiratory failure requiring supplemental oxygen  COPD with acute exacerbation  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  History of PEG tube placement  History of non-Hodgkin's lymphoma  History of severe aortic stenosis s/p TAVR  History of MSSA cellulitis  Type 2 diabetes       Plan:  -Norepinephrine currently at 0.03; continue to wean as tolerated to keep MAP greater than 65  -Will increase midodrine to 10 mg 3 times daily  -Continue supplemental oxygen, wean for SpO2 greater than 90  -Recommend NIPPV 14/7 with naps and at night  -Continue bronchopulmonary hygiene, bronchodilator protocol  -Continue Brovana, Pulmicort and reduced DuoNeb to 3  times daily  -Continue aspiration precautions  -Patient does have numerous allergies, reviewed history with pharmacy has previously tolerated cefepime, continue 14 days for bacteremia  -Blood culture 2/2 with Enterobacter species, continue cefepime for 14 days total from clearance  -Repeat blood cultures until clearance  -Tolerating p.o., can DC NS today  -Previous echocardiogram 5/2023 EF 46 to 50%, TAVR,  -Wound care following for chronic lower extremity wounds, appreciate assistance  -Patient has pain pump, primary discussing pain regimen; did verbalize abdominal pain, will obtain KUB  -Trend renal function, monitor replace electrolytes as needed  -No tubes lines or drains    DVT prophylaxis:  Medical and mechanical DVT prophylaxis orders are present.        CODE STATUS:   Code Status (Patient has no pulse and is not breathing): CPR (Attempt to Resuscitate)  Medical Interventions (Patient has pulse or is breathing): Full Support    Patient is critically ill with septic shock requiring norepinephrine, gram-negative bacilli bacteremia. 32 minutes of critical care time was spent managing this patient, excluding procedures.  Also discussing the case with the patient and any available family, the admitting physician and any available ancillary staff.   Electronically signed by Cherelle Mitchell MD, 02/07/24, 3:07 PM EST.

## 2024-02-07 NOTE — PLAN OF CARE
Goal Outcome Evaluation:  Plan of Care Reviewed With: patient        Progress: no change  Outcome Evaluation: Patient was non compliant with breathing treatments and she stated that she was not going to wear her Bipap and she was getting tired of  everyone in the hospital telling her what she needs to do. RT has checked on her several times asking her to reconsider and put on the Bipap, patient does not want to wear her Bipap and is getting mad.

## 2024-02-07 NOTE — PROGRESS NOTES
RT EQUIPMENT DEVICE RELATED - SKIN ASSESSMENT    RT Medical Equipment/Device:     NIV Mask:  Under-the-nose   size:  B    Skin Assessment:      Cheek:  Intact  Chin:  Intact  Nose:  Intact    Device Skin Pressure Protection:  Positioning supports utilized and Pressure points protected    Nurse Notification:  Zayda Houser, RRT

## 2024-02-07 NOTE — THERAPY EVALUATION
Patient Name: Isha Llanes  : 1965    MRN: [id number]                              Today's Date: 2024       Admit Date: 2024    Visit Dx:     ICD-10-CM ICD-9-CM   1. Sepsis, due to unspecified organism, unspecified whether acute organ dysfunction present  A41.9 038.9     995.91   2. Pneumonia due to infectious organism, unspecified laterality, unspecified part of lung  J18.9 486   3. Acute respiratory failure with hypoxia and hypercapnia  J96.01 518.81    J96.02    4. Difficulty walking  R26.2 719.7     Patient Active Problem List   Diagnosis    Septic shock    Acute MI    Cancer associated pain    Presence of intrathecal pump    Chronic low back pain with bilateral sciatica    Sacroiliac inflammation    Chronic pain syndrome    Pelvic pain    Aortic stenosis, severe    Dyspnea on effort    Other pulmonary embolism without acute cor pulmonale    Nonrheumatic aortic valve stenosis    Hip pain    Anxiety disorder    Allergic rhinitis due to allergen    Arthritis    Asthma    Kidney disease    CHF (congestive heart failure)    Chronic obstructive pulmonary disease    Diabetes mellitus, type 2    Depression    GERD (gastroesophageal reflux disease)    S/P TAVR (transcatheter aortic valve replacement)    Limited mobility    Venous hypertension of both lower extremities    Nicotine dependence    Mitral valve prolapse    Lupus (systemic lupus erythematosus)    Long term current use of anticoagulant therapy    History of Hodgkin's lymphoma    Hepatic steatosis    Heart murmur    CAD (coronary artery disease)    Non-healing wound of lower extremity, subsequent encounter    MSSA (methicillin susceptible Staphylococcus aureus) infection    Sepsis    Change in bowel habits    Nausea    Venous stasis ulcer of right calf    Venous stasis ulcer of left calf    Noncompliance    Peripheral vascular disease    Obesity with body mass index 30 or greater    Neurologic disorder    Neck pain    Limb pain    Hiatal  hernia    Bladder disorder    Frailty    Cellulitis of right foot    Bowel disease    Atrophy of skin    Breast lump    Encounter for care related to vascular access port    Primary osteoarthritis of right hip    Tobacco abuse    Laryngeal cancer    Throat pain    Voice hoarseness    Hodgkin lymphoma    Malignant neoplasm of supraglottis    Dysphagia     Past Medical History:   Diagnosis Date    Anesthesia     REPORTS HAS GOT REAL EMOTIONAL AND HAS BECOME ANGRY BEFORE    Anxiety     Aortic stenosis, severe     HAS BIO VALVE REPLACED    Arthritis     Asthma     Back pain     Blood clotting disorder     Cancer     Cancer associated pain     HODGKINS LYMPHOMA    CHF (congestive heart failure)     Chronic low back pain with sciatica     Chronic pain disorder     COPD (chronic obstructive pulmonary disease)     Coronary artery disease     Diabetes mellitus     TYPE 2    Diabetes mellitus     Dyspnea on effort     GERD (gastroesophageal reflux disease)     H/O lumpectomy     H/O: hysterectomy     Hiatal hernia     History of degenerative disc disease     History of kidney stones     History of pulmonary embolus (PE)     History of transfusion     AS A CHILD NO TRANSFUSION REACTION    History of transfusion     Hodgkin's lymphoma     5/19/23  5 YEARS SINCE LAST CHEMO TX    Hypertension     Immobility     Liver disease     DENIES ANY CURRENT ISSUES    Lupus     Mitral valve prolapse     Panic disorder     Pelvic pain     Peripheral neuropathy     Personal history of DVT (deep vein thrombosis)     Presence of intrathecal pump     PTSD (post-traumatic stress disorder)     Sacroiliac joint disease     SI (sacroiliac) joint inflammation     Sleep apnea     Venous insufficiency      Past Surgical History:   Procedure Laterality Date    ABDOMINAL SURGERY      BACK SURGERY      SPINAL FUSION     BACK SURGERY      BLADDER REPAIR      CARDIAC CATHETERIZATION N/A 03/06/2019    Procedure: Valvuloplasty;  Surgeon: Wayne Johnson  MD TRACY;  Location: [facility] CATH INVASIVE LOCATION;  Service: Cardiology    CARDIAC CATHETERIZATION      CARDIAC CATHETERIZATION Left 5/31/2023    Procedure: Cardiac Catheterization/Vascular Study;  Surgeon: Poncho Reid MD;  Location: [facility] CATH INVASIVE LOCATION;  Service: Cardiovascular;  Laterality: Left;    CARDIAC SURGERY      CARDIAC VALVE REPLACEMENT  2021    CHOLECYSTECTOMY      COLONOSCOPY N/A 12/29/2021    Procedure: COLONOSCOPY;  Surgeon: Karine Orlando MD;  Location: [facility] ENDOSCOPY;  Service: Gastroenterology;  Laterality: N/A;  POOR PREP    COLONOSCOPY N/A 04/13/2022    Procedure: COLONOSCOPY WITH POLYPECTOMY;  Surgeon: Karine Orlando MD;  Location: [facility] ENDOSCOPY;  Service: Gastroenterology;  Laterality: N/A;  COLON POLYP, HEMORRHOIDS, POOR PREP    COLONOSCOPY      DIRECT LARYNGOSCOPY, ESOPHAGOSCOPY, BRONCHOSCOPY N/A 5/22/2023    Procedure: DIRECT LARYNGOSCOPY WITH BIOPSIES , ESOPHAGOSCOPY, BRONCHOSCOPY;  Surgeon: Ronnie Mcgarry MD;  Location: [facility] OR OSC;  Service: ENT;  Laterality: N/A;    ENDOSCOPY N/A 12/29/2021    Procedure: ESOPHAGOGASTRODUODENOSCOPY;  Surgeon: Karine Orlando MD;  Location: [facility] ENDOSCOPY;  Service: Gastroenterology;  Laterality: N/A;  ESOPHAGITIS, GASTRITIS, HIATAL HERNIA    ENDOSCOPY      HYSTERECTOMY      LYMPHADENECTOMY      PAIN PUMP INSERTION/REVISION N/A 10/18/2019    Procedure: PAIN PUMP INSERTION [facility] 10-18-19 [place];  Surgeon: Horace Rios MD;  Location: [facility] OR;  Service: Pain Management    PAIN PUMP INSERTION/REVISION N/A 11/23/2020    Procedure: pain pump removal;  Surgeon: Horace Rios MD;  Location: [facility] MAIN OR;  Service: Pain Management;  Laterality: N/A;    PEG TUBE INSERTION N/A 7/26/2023    Procedure: PERCUTANEOUS ENDOSCOPIC GASTROSTOMY TUBE INSERTION;  Surgeon: Kody Mercer MD;  Location: [facility] ENDOSCOPY;  Service: General;  Laterality: N/A;  SUCCESSFUL PEG TUBE PLACE PLACEMENT     PORTACATH PLACEMENT      IN LEFT CHEST REPORTS THAT IT IS NOT FUNCTIONING    SKIN BIOPSY      TONSILLECTOMY      TUBAL ABDOMINAL LIGATION      TUMOR REMOVAL        General Information       Row Name 02/07/24 1120          OT Time and Intention    Document Type evaluation  -     Mode of Treatment individual therapy;occupational therapy  -       Row Name 02/07/24 1120          General Information    Patient Profile Reviewed yes  -     Prior Level of Function --  patient reports she is (I) with squat pivot transfer to w/c as well as (I) with adls. patient state she sits on edge of tub for bathing and has a raised toilet in place.  -     Existing Precautions/Restrictions fall  -     Barriers to Rehab none identified  -       Row Name 02/07/24 1120          Occupational Profile    Reason for Services/Referral (Occupational Profile) Pt. is a 58year old female admitted for the above diagnosis. Pt. referred to OT services to assess independence with ADLs and adl transfers/fx'l mobility. No previous OT services for current condition.  -       Row Name 02/07/24 1120          Living Environment    People in Home child(felix), adult  -       Row Name 02/07/24 1120          Cognition    Orientation Status (Cognition) oriented x 3  -       Row Name 02/07/24 1120          Safety Issues, Functional Mobility    Impairments Affecting Function (Mobility) balance;endurance/activity tolerance;shortness of breath  -               User Key  (r) = Recorded By, (t) = Taken By, (c) = Cosigned By      Initials Name Provider Type     Daryc Anaya OT Occupational Therapist                     Mobility/ADL's       Row Name 02/07/24 1123          Bed Mobility    Bed Mobility sit-supine  -     All Activities, [place] (Bed Mobility) minimum assist (75% patient effort)  -     Sit-Supine [place] (Bed Mobility) verbal cues  -     Bed Mobility, Safety Issues decreased use of legs for bridging/pushing  -      Assistive Device (Bed Mobility) head of bed elevated;bed rails  -[facility] Name 02/07/24 1123          Transfers    Transfers sit-stand transfer  -[facility] Name 02/07/24 1123          Sit-Stand Transfer    Sit-Stand [place] (Transfers) contact guard;1 person assist  -     Comment, (Sit-Stand Transfer) patient was CGA with sit to/from stand (knees remain flexed at baseline) with use of bed rail and able to pivot to position themselves higher in bed.  -[facility] Name 02/07/24 1123          Activities of Daily Living    BADL Assessment/Intervention --  patient is setup for upper body bathing/dressing, setup for self-feeding, setup for grooming, min A for lower body bathing/dressing, CGA for toileting.  -               User Key  (r) = Recorded By, (t) = Taken By, (c) = Cosigned By      Initials Name Provider Type     Darcy Anaya OT Occupational Therapist                   Obj/Interventions       [facility] Name 02/07/24 1128          Sensory Assessment (Somatosensory)    Sensory Assessment (Somatosensory) UE sensation intact  -[facility] Name 02/07/24 1128          Vision Assessment/Intervention    Visual Impairment/Limitations WFL  -[facility] Name 02/07/24 1128          Range of Motion Comprehensive    General Range of Motion bilateral upper extremity ROM WNL  -[facility] Name 02/07/24 1128          Strength Comprehensive (MMT)    General Manual Muscle Testing (MMT) Assessment no strength deficits identified  -[facility] Name 02/07/24 1128          Motor Skills    Motor Skills coordination;functional endurance  -     Coordination WFL  -     Functional Endurance fair  -[facility] Name 02/07/24 1128          Balance    Balance Assessment standing static balance  -     Static Standing Balance contact guard  -     Position/Device Used, Standing Balance supported  -     Balance Interventions standing;sit to stand;supported;minimal challenge;occupation based/functional task  -                User Key  (r) = Recorded By, (t) = Taken By, (c) = Cosigned By      Initials Name Provider Type    AC Darcy Anaya, OT Occupational Therapist                   Goals/Plan       Row Name 02/07/24 1130          Bed Mobility Goal 1 (OT)    Activity/Assistive Device (Bed Mobility Goal 1, OT) bed mobility activities, all  -AC     [place] Level/Cues Needed (Bed Mobility Goal 1, OT) modified independence  -AC     Time Frame (Bed Mobility Goal 1, OT) long term goal (LTG);10 days  -AC       Row Name 02/07/24 1130          Transfer Goal 1 (OT)    Activity/Assistive Device (Transfer Goal 1, OT) transfers, all  -AC     [place] Level/Cues Needed (Transfer Goal 1, OT) modified independence  -AC     Time Frame (Transfer Goal 1, OT) long term goal (LTG);10 days  -AC       Row Name 02/07/24 1130          Bathing Goal 1 (OT)    Activity/Device (Bathing Goal 1, OT) bathing skills, all  -AC     [place] Level/Cues Needed (Bathing Goal 1, OT) modified independence  -AC     Time Frame (Bathing Goal 1, OT) long term goal (LTG);10 days  -AC       Row Name 02/07/24 1130          Dressing Goal 1 (OT)    Activity/Device (Dressing Goal 1, OT) dressing skills, all  -AC     [place]/Cues Needed (Dressing Goal 1, OT) modified independence  -AC     Time Frame (Dressing Goal 1, OT) long term goal (LTG);10 days  -AC       Row Name 02/07/24 1130          Toileting Goal 1 (OT)    Activity/Device (Toileting Goal 1, OT) toileting skills, all  -AC     [place] Level/Cues Needed (Toileting Goal 1, OT) modified independence  -AC     Time Frame (Toileting Goal 1, OT) long term goal (LTG);10 days  -AC       Row Name 02/07/24 1130          Problem Specific Goal 1 (OT)    Problem Specific Goal 1 (OT) patient will demonstrate good activity tolerance for adls.  -AC     Time Frame (Problem Specific Goal 1, OT) long term goal (LTG);10 days  -AC       Row Name 02/07/24 1130          Therapy Assessment/Plan (OT)    Planned Therapy  Interventions (OT) activity tolerance training;BADL retraining;functional balance retraining;occupation/activity based interventions;patient/caregiver education/training;ROM/therapeutic exercise;transfer/mobility retraining  -               User Key  (r) = Recorded By, (t) = Taken By, (c) = Cosigned By      Initials Name Provider Type     Darcy Anaya OT Occupational Therapist                   Clinical Impression       Row Name 02/07/24 1129          Pain Assessment    Pretreatment Pain Rating 1/10  -     Posttreatment Pain Rating 1/10  -     Pre/Posttreatment Pain Comment patient complained of reflux, nursing already aware.  -       Row Name 02/07/24 1129          Plan of Care Review    Plan of Care Reviewed With patient;daughter  -     Progress no change  -     Outcome Evaluation Patient presents with balance and endurance limitations that impede his/her ability to perform ADLS. The skills of a therapist are necessary to maximize independence with ADLs.  -       Row Name 02/07/24 1129          Therapy Assessment/Plan (OT)    Patient/Family Therapy Goal Statement (OT) None stated.  -     Rehab Potential (OT) good, to achieve stated therapy goals  -     Criteria for Skilled Therapeutic Interventions Met (OT) yes;meets criteria;skilled treatment is necessary  -     Therapy Frequency (OT) 5 times/wk  -       Row Name 02/07/24 1129          Therapy Plan Review/Discharge Plan (OT)    Equipment Needs Upon Discharge (OT) wheelchair;commode chair  -     Anticipated Discharge Disposition (OT) home with assist;home with home health  -       Row Name 02/07/24 1129          Positioning and Restraints    Pre-Treatment Position in bed  -     Post Treatment Position bed  -     In Bed fowlers;encouraged to call for assist;exit alarm on;patient within staff view;with family/caregiver  -               User Key  (r) = Recorded By, (t) = Taken By, (c) = Cosigned By      Initials Name Provider Type     Darcy Godinez OT Occupational Therapist                   Outcome Measures       Row Name 02/07/24 1132          How much help from another is currently needed...    Putting on and taking off regular lower body clothing? 3  -AC     Bathing (including washing, rinsing, and drying) 3  -AC     Toileting (which includes using toilet bed pan or urinal) 3  -AC     Putting on and taking off regular upper body clothing 4  -AC     Taking care of personal grooming (such as brushing teeth) 4  -AC     Eating meals 4  -AC     AM-PAC 6 Clicks Score (OT) 21  -AC       Row Name 02/07/24 0800          How much help from another person do you currently need...    Turning from your back to your side while in flat bed without using bedrails? 3  -TM     Moving from lying on back to sitting on the side of a flat bed without bedrails? 3  -TM     Moving to and from a bed to a chair (including a wheelchair)? 3  -TM     Standing up from a chair using your arms (e.g., wheelchair, bedside chair)? 3  -TM     Climbing 3-5 steps with a railing? 2  -TM     To walk in hospital room? 2  -TM     AM-PAC 6 Clicks Score (PT) 16  -TM     Highest Level of Mobility Goal 5 --> Static standing  -TM       Row Name 02/07/24 1132          Functional Assessment    Outcome Measure Options AM-PAC 6 Clicks Daily Activity (OT);Optimal Instrument  -AC       Row Name 02/07/24 1132          Optimal Instrument    Optimal Instrument Optimal - 3  -AC     Bending/Stooping 2  -AC     Standing 2  -AC     Reaching 1  -AC     From the list, choose the 3 activities you would most like to be able to do without any difficulty Standing;Reaching;Bending/stooping  -AC     Total Score Optimal - 3 5  -AC               User Key  (r) = Recorded By, (t) = Taken By, (c) = Cosigned By      Initials Name Provider Type    TM Corrie Juan RN Registered Nurse    Darcy Godinez OT Occupational Therapist                    Occupational Therapy Education       Title: PT OT SLP  Therapies (Done)       Topic: Occupational Therapy (Done)       Point: ADL training (Done)       Description:   Instruct learner(s) on proper safety adaptation and remediation techniques during self care or transfers.   Instruct in proper use of assistive devices.                  Learning Progress Summary             Patient Acceptance, E, VU by  at 2/7/2024 1134                         Point: Home exercise program (Done)       Description:   Instruct learner(s) on appropriate technique for monitoring, assisting and/or progressing therapeutic exercises/activities.                  Learning Progress Summary             Patient Acceptance, E, VU by  at 2/7/2024 1134                         Point: Precautions (Done)       Description:   Instruct learner(s) on prescribed precautions during self-care and functional transfers.                  Learning Progress Summary             Patient Acceptance, E, VU by  at 2/7/2024 1134                         Point: Body mechanics (Done)       Description:   Instruct learner(s) on proper positioning and spine alignment during self-care, functional mobility activities and/or exercises.                  Learning Progress Summary             Patient Acceptance, E, VU by  at 2/7/2024 1134                                         User Key       Initials Effective Dates Name Provider Type Discipline     06/16/21 -  Darcy Anaya OT Occupational Therapist OT                  OT Recommendation and Plan  Planned Therapy Interventions (OT): activity tolerance training, BADL retraining, functional balance retraining, occupation/activity based interventions, patient/caregiver education/training, ROM/therapeutic exercise, transfer/mobility retraining  Therapy Frequency (OT): 5 times/wk  Plan of Care Review  Plan of Care Reviewed With: patient, daughter  Progress: no change  Outcome Evaluation: Patient presents with balance and endurance limitations that impede his/her ability to  perform ADLS. The skills of a therapist are necessary to maximize independence with ADLs.     Time Calculation:   Evaluation Complexity (OT)  Review Occupational Profile/Medical/Therapy History Complexity: brief/low complexity  Assessment, Occupational Performance/Identification of Deficit Complexity: 1-3 performance deficits  Clinical Decision Making Complexity (OT): problem focused assessment/low complexity  Overall Complexity of Evaluation (OT): low complexity     Time Calculation- OT       Row Name 02/07/24 1135             Time Calculation- OT    OT Received On 02/07/24  -AC      OT Goal Re-Cert Due Date 02/16/24  -AC         Untimed Charges    OT Eval/Re-eval Minutes 29  -AC         Total Minutes    Untimed Charges Total Minutes 29  -AC       Total Minutes 29  -AC                User Key  (r) = Recorded By, (t) = Taken By, (c) = Cosigned By      Initials Name Provider Type    AC Darcy Anaya OT Occupational Therapist                  Therapy Charges for Today       Code Description Service Date Service Provider Modifiers Qty    [id number] HC OT EVAL LOW COMPLEXITY 2 2/7/2024 Darcy Anaya OT GO 1                 Darcy Anaya OT  2/7/2024

## 2024-02-07 NOTE — PLAN OF CARE
Goal Outcome Evaluation:  Plan of Care Reviewed With: patient    Remains on Levophed.  2L nc.

## 2024-02-07 NOTE — PLAN OF CARE
Goal Outcome Evaluation:  Plan of Care Reviewed With: patient           Outcome Evaluation: Patient refused bipap last night. Currently on 2L nasal cannunla. Bipap on standby in room for as needed.

## 2024-02-08 LAB
ANION GAP SERPL CALCULATED.3IONS-SCNC: 7.1 MMOL/L (ref 5–15)
BUN SERPL-MCNC: 7 MG/DL (ref 6–20)
BUN/CREAT SERPL: 17.5 (ref 7–25)
CALCIUM SPEC-SCNC: 9.3 MG/DL (ref 8.6–10.5)
CHLORIDE SERPL-SCNC: 105 MMOL/L (ref 98–107)
CO2 SERPL-SCNC: 26.9 MMOL/L (ref 22–29)
CREAT SERPL-MCNC: 0.4 MG/DL (ref 0.57–1)
DEPRECATED RDW RBC AUTO: 47.7 FL (ref 37–54)
EGFRCR SERPLBLD CKD-EPI 2021: 114.9 ML/MIN/1.73
ERYTHROCYTE [DISTWIDTH] IN BLOOD BY AUTOMATED COUNT: 14.6 % (ref 12.3–15.4)
GLUCOSE SERPL-MCNC: 97 MG/DL (ref 65–99)
HCT VFR BLD AUTO: 34.1 % (ref 34–46.6)
HGB BLD-MCNC: 10.6 G/DL (ref 12–15.9)
MAGNESIUM SERPL-MCNC: 1.8 MG/DL (ref 1.6–2.6)
MCH RBC QN AUTO: 27.9 PG (ref 26.6–33)
MCHC RBC AUTO-ENTMCNC: 31.1 G/DL (ref 31.5–35.7)
MCV RBC AUTO: 89.7 FL (ref 79–97)
PHOSPHATE SERPL-MCNC: 3.5 MG/DL (ref 2.5–4.5)
PLATELET # BLD AUTO: 91 10*3/MM3 (ref 140–450)
PMV BLD AUTO: 10.3 FL (ref 6–12)
POTASSIUM SERPL-SCNC: 4 MMOL/L (ref 3.5–5.2)
QT INTERVAL: 297 MS
QTC INTERVAL: 444 MS
RBC # BLD AUTO: 3.8 10*6/MM3 (ref 3.77–5.28)
SODIUM SERPL-SCNC: 139 MMOL/L (ref 136–145)
WBC NRBC COR # BLD AUTO: 2.04 10*3/MM3 (ref 3.4–10.8)

## 2024-02-08 PROCEDURE — 94799 UNLISTED PULMONARY SVC/PX: CPT

## 2024-02-08 PROCEDURE — 99291 CRITICAL CARE FIRST HOUR: CPT | Performed by: STUDENT IN AN ORGANIZED HEALTH CARE EDUCATION/TRAINING PROGRAM

## 2024-02-08 PROCEDURE — 83735 ASSAY OF MAGNESIUM: CPT | Performed by: INTERNAL MEDICINE

## 2024-02-08 PROCEDURE — 25010000002 ONDANSETRON PER 1 MG: Performed by: FAMILY MEDICINE

## 2024-02-08 PROCEDURE — 80048 BASIC METABOLIC PNL TOTAL CA: CPT | Performed by: INTERNAL MEDICINE

## 2024-02-08 PROCEDURE — 25010000002 HEPARIN (PORCINE) PER 1000 UNITS: Performed by: NURSE PRACTITIONER

## 2024-02-08 PROCEDURE — 99233 SBSQ HOSP IP/OBS HIGH 50: CPT | Performed by: INTERNAL MEDICINE

## 2024-02-08 PROCEDURE — 94762 N-INVAS EAR/PLS OXIMTRY CONT: CPT

## 2024-02-08 PROCEDURE — 84100 ASSAY OF PHOSPHORUS: CPT | Performed by: INTERNAL MEDICINE

## 2024-02-08 PROCEDURE — 85027 COMPLETE CBC AUTOMATED: CPT | Performed by: INTERNAL MEDICINE

## 2024-02-08 PROCEDURE — 25010000002 CEFEPIME PER 500 MG: Performed by: NURSE PRACTITIONER

## 2024-02-08 RX ORDER — ALPRAZOLAM 0.25 MG/1
0.25 TABLET ORAL ONCE
Status: COMPLETED | OUTPATIENT
Start: 2024-02-08 | End: 2024-02-08

## 2024-02-08 RX ORDER — ALPRAZOLAM 0.25 MG/1
0.5 TABLET ORAL 2 TIMES DAILY PRN
Status: DISCONTINUED | OUTPATIENT
Start: 2024-02-08 | End: 2024-02-09 | Stop reason: HOSPADM

## 2024-02-08 RX ADMIN — ACETAMINOPHEN 1000 MG: 500 TABLET ORAL at 21:30

## 2024-02-08 RX ADMIN — IPRATROPIUM BROMIDE AND ALBUTEROL SULFATE 3 ML: .5; 3 SOLUTION RESPIRATORY (INHALATION) at 00:17

## 2024-02-08 RX ADMIN — IPRATROPIUM BROMIDE AND ALBUTEROL SULFATE 3 ML: .5; 3 SOLUTION RESPIRATORY (INHALATION) at 19:17

## 2024-02-08 RX ADMIN — ACETAMINOPHEN 1000 MG: 500 TABLET ORAL at 08:22

## 2024-02-08 RX ADMIN — CALCIUM CARBONATE 2 TABLET: 500 TABLET, CHEWABLE ORAL at 11:08

## 2024-02-08 RX ADMIN — Medication 10 ML: at 21:30

## 2024-02-08 RX ADMIN — CEFEPIME 2000 MG: 2 INJECTION, POWDER, FOR SOLUTION INTRAVENOUS at 11:08

## 2024-02-08 RX ADMIN — MIDODRINE HYDROCHLORIDE 10 MG: 10 TABLET ORAL at 08:22

## 2024-02-08 RX ADMIN — Medication 10 ML: at 08:28

## 2024-02-08 RX ADMIN — HEPARIN SODIUM 5000 UNITS: 5000 INJECTION INTRAVENOUS; SUBCUTANEOUS at 21:29

## 2024-02-08 RX ADMIN — CALCIUM CARBONATE 2 TABLET: 500 TABLET, CHEWABLE ORAL at 17:49

## 2024-02-08 RX ADMIN — NICOTINE 1 PATCH: 21 PATCH, EXTENDED RELEASE TRANSDERMAL at 08:23

## 2024-02-08 RX ADMIN — ARFORMOTEROL TARTRATE 15 MCG: 15 SOLUTION RESPIRATORY (INHALATION) at 19:17

## 2024-02-08 RX ADMIN — MIDODRINE HYDROCHLORIDE 10 MG: 10 TABLET ORAL at 11:08

## 2024-02-08 RX ADMIN — HEPARIN SODIUM 5000 UNITS: 5000 INJECTION INTRAVENOUS; SUBCUTANEOUS at 14:26

## 2024-02-08 RX ADMIN — MIDODRINE HYDROCHLORIDE 10 MG: 10 TABLET ORAL at 16:31

## 2024-02-08 RX ADMIN — CEFEPIME 2000 MG: 2 INJECTION, POWDER, FOR SOLUTION INTRAVENOUS at 03:38

## 2024-02-08 RX ADMIN — ACETAMINOPHEN 1000 MG: 500 TABLET ORAL at 14:26

## 2024-02-08 RX ADMIN — ALPRAZOLAM 0.25 MG: 0.25 TABLET ORAL at 11:44

## 2024-02-08 RX ADMIN — CLOPIDOGREL BISULFATE 75 MG: 75 TABLET, FILM COATED ORAL at 08:22

## 2024-02-08 RX ADMIN — CEFEPIME 2000 MG: 2 INJECTION, POWDER, FOR SOLUTION INTRAVENOUS at 18:20

## 2024-02-08 RX ADMIN — BUDESONIDE 0.5 MG: 0.5 SUSPENSION RESPIRATORY (INHALATION) at 19:17

## 2024-02-08 RX ADMIN — IPRATROPIUM BROMIDE AND ALBUTEROL SULFATE 3 ML: .5; 3 SOLUTION RESPIRATORY (INHALATION) at 11:30

## 2024-02-08 RX ADMIN — ONDANSETRON 4 MG: 2 INJECTION INTRAMUSCULAR; INTRAVENOUS at 17:49

## 2024-02-08 RX ADMIN — ALPRAZOLAM 0.5 MG: 0.25 TABLET ORAL at 21:29

## 2024-02-08 NOTE — SIGNIFICANT NOTE
02/08/24 4397   Plan   Plan Patient states that she has home oxygen through [employer], but it is not continuous. Patient will need new walk test at discharge.

## 2024-02-08 NOTE — PROGRESS NOTES
Pulmonary / Critical Care Progress Note      Patient Name: Isha Llanes  : 1965  MRN: [id number]  Attending:  Virgil Navarro MD   Date of admission: 2024    Subjective   Subjective   Patient critically ill with septic shock, acute hypoxic hypercarbic respiratory failure    Doing well this morning  No acute events overnight  Norepinephrine has been weaned off around 1950  Good p.o. intake  Still having some pain, improved with scheduled Tylenol  KUB largely unremarkable    Review of Systems  Constitutional symptoms: + General pain, denied complaints   Ear, nose, throat: Denied complaints  Cardiovascular:  Denied complaints  Respiratory: Denied complaints  Gastrointestinal: + Abdominal pain, denied complaints  Musculoskeletal: Denied complaints  Neurologic: Denied complaints  Skin: Denied complaints        Objective   Objective     Vitals:   Vital signs for last 24 hours:  Temp:  [97.9 °F (36.6 °C)] 97.9 °F (36.6 °C)  Heart Rate:  [63-93] 70  Resp:  [14-20] 16  BP: ()/(52-87) 122/87    Intake/Output last 3 shifts:  I/O last 3 completed shifts:  In: -   Out: 525 [Urine:525]  Intake/Output this shift:  I/O this shift:  In: -   Out: 400 [Urine:400]    Physical Exam   Vital Signs Reviewed   WDWN, weight, alert chronically ill-appearing  HEENT:  PERRL, EOMI.  OP, nares clear  Chest:  good aeration, rhonchi to auscultation bilaterally, tympanic to percussion bilaterally, no work of breathing noted on room air  CV: RRR, no MGR, pulses 2+, equal.  Abd:  Soft, NT, ND, + BS, no HSM  EXT:  no clubbing, no cyanosis, no edema  Neuro:  A&Ox3, CN grossly intact, no focal deficits.  Skin: No rashes or lesions noted, chronic changes of bilateral lower extremities    Result Review    Result Review:  I have personally reviewed the results from the time of this admission to 2024 14:14 EST and agree with these findings:  [x]  Laboratory  [x]  Microbiology  [x]  Radiology  []  EKG/Telemetry   []   Cardiology/Vascular   []  Pathology  []  Old records  []  Other:  Most notable findings include:   2/5/2024   Respiratory culture in process  Blood culture 2/2 BCID Enterobacterales species        Lab 02/08/24  0421 02/07/24  0645 02/07/24  0254 02/06/24  0241 02/05/24  0758 02/05/24  0136 02/05/24  0113   WBC 2.04*  --  3.95 5.23 12.08*  --  8.50   HEMOGLOBIN 10.6*  --  10.8* 10.6* 13.3  --  12.5   HEMATOCRIT 34.1  --  35.1 35.4 44.6  --  39.7   PLATELETS 91*  --  98* 97* 120*  --  150   SODIUM 139  --  138 139 139 138  --    SODIUM, ARTERIAL  --  138.0  --   --   --   --   --    POTASSIUM 4.0  --  3.8 4.2 4.0 4.5  --    CHLORIDE 105  --  103 105 105 98  --    CO2 26.9  --  26.2 22.8 23.0 30.2*  --    BUN 7  --  6 9 13 10  --    CREATININE 0.40*  --  0.41* 0.38* 0.59 0.57  --    GLUCOSE 97  --  137* 94 115* 126*  --    GLUCOSE, ARTERIAL  --  127*  --   --   --   --   --    CALCIUM 9.3  --  8.3* 8.4* 8.4* 9.3  --    PHOSPHORUS 3.5  --  2.6 2.3*  --  4.5  --    TOTAL PROTEIN  --   --   --   --  6.3 7.5  --    ALBUMIN  --   --   --   --  3.4* 4.0  --    GLOBULIN  --   --   --   --  2.9 3.5  --          Assessment & Plan   Assessment / Plan     Active Hospital Problems:  Active Hospital Problems    Diagnosis    • Sepsis      Impression:  Septic shock, present on admission  Enterobacter bacteremia  Pneumonia of unspecified organism  Acute hypoxic and hypercarbic respiratory failure requiring supplemental oxygen  COPD with acute exacerbation  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  Continue to monitor; keep MAP greater than 65  -Continue midodrine to 10 mg 3 times daily  -Continue supplemental oxygen, wean for SpO2 greater than 90  -Recommend NIPPV 14/7 with naps and at night.  Will consult RT  manager for home NIPPV.  -Continue bronchopulmonary hygiene, bronchodilator protocol  -Continue Brovana, Pulmicort and reduced DuoNeb to 3 times daily  -Continue aspiration precautions  -Patient does have numerous allergies, reviewed history with pharmacy has previously tolerated cefepime, continue 14 days for bacteremia  -Blood culture 2/2 with Enterobacter species, continue cefepime for 14 days total from clearance  -Repeat blood cultures until clearance  -Tolerating p.o.  -Previous echocardiogram 5/2023 EF 46 to 50%, TAVR,  -Wound care following for chronic lower extremity wounds, appreciate assistance  -Patient has pain pump, primary discussing pain regimen as well as medication for anxiety  -Trend renal function, monitor replace electrolytes as needed  -No tubes lines or drains    If patient remains off norepinephrine this afternoon, can be transferred out of the ICU    DVT prophylaxis:  Medical and mechanical DVT prophylaxis orders are present.        CODE STATUS:   Code Status (Patient has no pulse and is not breathing): CPR (Attempt to Resuscitate)  Medical Interventions (Patient has pulse or is breathing): Full Support    Patient is critically ill with septic shock requiring norepinephrine, gram-negative bacilli bacteremia. 31 minutes of critical care time was spent managing this patient, excluding procedures.  Also discussing the case with the patient and any available family, the admitting physician and any available ancillary staff. Electronically signed by Cherelle Mitchell MD, 02/08/24, 2:17 PM EST.

## 2024-02-08 NOTE — PLAN OF CARE
Goal Outcome Evaluation:  Plan of Care Reviewed With: patient        Progress: no change  Outcome Evaluation: Patient did not wear bipap last night. Unit is on standby in the room. Patient is on room air

## 2024-02-08 NOTE — PROGRESS NOTES
RT EQUIPMENT DEVICE RELATED - SKIN ASSESSMENT    RT Medical Equipment/Device:     NIV Mask:  Under-the-nose   size:  b    Skin Assessment:      Cheek:  Intact  Chin:  Intact  Nose:  Intact  Lips:  Intact    Device Skin Pressure Protection:  Pressure points protected    Nurse Notification:  Zayda Juan, RRT

## 2024-02-08 NOTE — PLAN OF CARE
Goal Outcome Evaluation:  Plan of Care Reviewed With: patient      Patient arrived to the unit at 1700, no acute distress noted, no c/o voiced. Will continue to monitor.

## 2024-02-08 NOTE — CONSULTS
Consult received for home NIPPV.    Ms Llanes is a current smoker (30 pack years) with a past medical history of y Hodgkin's lymphoma with her last chemo being 6 weeks ago, CHF, COPD, CAD, diabetes mellitus, hypertension, lupus and aortic stenosis status post valve replacement. Patient currently admitted with Community-acquired pneumonia, septic shock and acute hypoxic and hypercarbic respiratory failure secondary to COPD exacerbation.    Overnight pulse oximetry on 2L will need to be performed to qualify patient for home bipap; orders placed. I will review results and follow up with patient tomorrow.

## 2024-02-08 NOTE — PROGRESS NOTES
[facility]   Hospitalist Progress Note  Date: 2024  Patient Name: Isha Llanes  : 1965  MRN: [id number]  Date of admission: 2024  Consultants:   -Pulmonology: Dr. Cherelle Mitchell    Subjective   Subjective     Chief Complaint: Altered mental status    Summary:   Isha Llanes is a 58 y.o. 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  Patient was found to be febrile and hypotensive and hypoxic.  Pro-Ge was noted to be elevated on labs.  CT abdomen pelvis showed possible bibasilar pneumonia.  Hospitalist service contacted for further evaluation and management.  Patient started on antibiotic treatment, Levophed and admitted to ICU level of care.  Pulmonology consulted.  Patient placed on BiPAP.  Patient's condition improved and patient able to wean off of Levophed and transition to nasal cannula.  Patient's home meds and exposures found and patient had decreasing level of consciousness and increased supplemental O2 requirements so patient remained in ICU level of care.  Patient had to be placed back on Levophed as well.  Midodrine started and Levophed wean as tolerated.    Interval Followup:   Patient able to be weaned off of Levophed.  She states she is feeling a little better today.  Shortness of breath improved.  No active chest pain.  Nursing with no additional acute issues to report.    Antibiotics:   -Cefepime    Objective   Objective     Vitals:   Temp:  [97.9 °F (36.6 °C)] 97.9 °F (36.6 °C)  Heart Rate:  [63-93] 70  Resp:  [14-20] 16  BP: ()/(51-87) 122/87  Physical Exam   Gen: Chronically ill-appearing female, conversant, pleasant, sitting up in bed  Resp: Improved aeration, no increase in work of  breathing, equal chest rise bilaterally  Card: RRR, No m/r/g  Abd: Soft, Nontender, Nondistended, + bowel sounds    Result Review    Result Review:  I have personally reviewed the results as below and agree with these findings:  []  Laboratory:   CMP          2/6/2024    02:41 2/7/2024    02:54 2/7/2024    06:45 2/8/2024    04:21   CMP   Glucose 94  137  127  97    BUN 9  6   7    Creatinine 0.38  0.41   0.40    EGFR 116.3  114.2   114.9    Sodium 139  138  138.0  139    Potassium 4.2  3.8   4.0    Chloride 105  103   105    Calcium 8.4  8.3   9.3    BUN/Creatinine Ratio 23.7  14.6   17.5    Anion Gap 11.2  8.8   7.1      CBC          2/6/2024    02:41 2/7/2024    02:54 2/8/2024    04:21   CBC   WBC 5.23  3.95  2.04    RBC 3.86  3.90  3.80    Hemoglobin 10.6  10.8  10.6    Hematocrit 35.4  35.1  34.1    MCV 91.7  90.0  89.7    MCH 27.5  27.7  27.9    MCHC 29.9  30.8  31.1    RDW 14.7  14.6  14.6    Platelets 97  98  91    Phosphorus and magnesium within normal limits.  [x]  Microbiology: Blood culture (02/05/2024): Enterobacterales.  Blood culture (02/06/2024): No growth to date.  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  -Continue supplemental O2 to maintain sats greater than 90%, wean as tolerated  -BiPAP available with naps and  as needed  -Continue Brovana, Pulmicort and DuoNebs  -Continue cefepime.  Patient will need 14 days of antibiotic management given bacteremia.  -Follow-up results of repeat blood cultures  -Continue midodrine  -Patient able to be weaned off of Levophed.  Transfer out of ICU.  -Start home as needed Xanax for anxiety since patient has been able to be weaned off of Levophed.  Continue holding additional pain medications at this time given concern for labile blood pressures.  -Monitor electrolytes and renal function with BMP, phosphorus level and magnesium level in the AM  -Monitor WBC and Hgb with CBC in the AM  -Clinical course will dictate further management     DVT Prophylaxis: Heparin  Diet: Regular  Dispo: If patient's blood pressure remains stable patient can likely transfer to monitored bed  Code Status: Full code     Time spent personally reviewing patient's chart, labs and imaging, evaluating/examining the patient, discussing care plan with patient and nurse at bedside and discussing management with consultants (Pulmonology): 51 minutes.     Part of this note may be an electronic transcription/translation of spoken language to printed text using the Dragon dictation system.    DVT prophylaxis:  Medical and mechanical DVT prophylaxis orders are present.        CODE STATUS:   Code Status (Patient has no pulse and is not breathing): CPR (Attempt to Resuscitate)  Medical Interventions (Patient has pulse or is breathing): Full Support    Electronically signed by Virgil Navarro MD, 02/08/24, 12:52 PM EST.

## 2024-02-08 NOTE — CONSULTS
Nutrition Services    Patient Name: Isha Llanes  YOB: 1965  MRN: [id number]  Admission date: 2/5/2024      CLINICAL NUTRITION ASSESSMENT      Reason for Assessment  MST Score 2+     H&P:  Past Medical History:   Diagnosis Date    Anesthesia     REPORTS HAS GOT REAL EMOTIONAL AND HAS BECOME ANGRY BEFORE    Anxiety     Aortic stenosis, severe     HAS BIO VALVE REPLACED    Arthritis     Asthma     Back pain     Blood clotting disorder     Cancer     Cancer associated pain     HODGKINS LYMPHOMA    CHF (congestive heart failure)     Chronic low back pain with sciatica     Chronic pain disorder     COPD (chronic obstructive pulmonary disease)     Coronary artery disease     Diabetes mellitus     TYPE 2    Diabetes mellitus     Dyspnea on effort     GERD (gastroesophageal reflux disease)     H/O lumpectomy     H/O: hysterectomy     Hiatal hernia     History of degenerative disc disease     History of kidney stones     History of pulmonary embolus (PE)     History of transfusion     AS A CHILD NO TRANSFUSION REACTION    History of transfusion     Hodgkin's lymphoma     5/19/23  5 YEARS SINCE LAST CHEMO TX    Hypertension     Immobility     Liver disease     DENIES ANY CURRENT ISSUES    Lupus     Mitral valve prolapse     Panic disorder     Pelvic pain     Peripheral neuropathy     Personal history of DVT (deep vein thrombosis)     Presence of intrathecal pump     PTSD (post-traumatic stress disorder)     Sacroiliac joint disease     SI (sacroiliac) joint inflammation     Sleep apnea     Venous insufficiency         Current Problems:   Active Hospital Problems    Diagnosis     Sepsis         Nutrition/Diet History         Narrative   Upon my visit pt reports that she did not like the pancakes this morning. She reports that intake is decreased due to nausea and not liking the mechanical ground diet, will monitor progress with speech therapy. Meal intakes are not documented other than 100% of snacks.  "Discussed nutrition supplements, pt reports that we can send them but she is unsure if she will drink them. Will order mighty shake BID for now and monitor acceptance. Pt was asking for tums, notified RN. Per chart review, pt has lost 28# since May of last year (17% weight change). NFPE: moderate temporal muscle wasting, otherwise no signs of wasting. Per wound care pt has chronic wounds to BLE. PEG noted on avatar, not being used for feeding.      Anthropometrics        Current Height, Weight Height: 162.6 cm (64\")  Weight: 60.4 kg (133 lb 2.5 oz)   Current BMI Body mass index is 22.86 kg/m².   BMI Classification Normal range   % %    Adjusted Body Weight (ABW)    Weight Hx  Wt Readings from Last 30 Encounters:   02/05/24 0657 60.4 kg (133 lb 2.5 oz)   02/05/24 0138 59 kg (130 lb)   01/09/24 1034 59 kg (130 lb)   12/19/23 1322 59 kg (130 lb 1.1 oz)   12/13/23 1319 56.7 kg (125 lb)   11/16/23 0933 56.7 kg (125 lb)   10/27/23 1503 56.9 kg (125 lb 7.1 oz)   10/06/23 1440 58 kg (127 lb 13.9 oz)   09/29/23 1055 57.2 kg (126 lb 1.7 oz)   09/29/23 1016 57.2 kg (126 lb)   09/06/23 0900 57.7 kg (127 lb 4.3 oz)   09/05/23 1014 58.4 kg (128 lb 12 oz)   08/30/23 0900 58.2 kg (128 lb 4.9 oz)   08/29/23 0952 59 kg (130 lb 1.1 oz)   08/24/23 0900 60.5 kg (133 lb 6.1 oz)   08/22/23 1001 61 kg (134 lb 7.7 oz)   08/18/23 1515 60 kg (132 lb 4.4 oz)   08/15/23 1003 61.2 kg (134 lb 14.7 oz)   08/10/23 0900 62.5 kg (137 lb 12.6 oz)   08/08/23 1013 61.9 kg (136 lb 7.4 oz)   08/02/23 0900 64.4 kg (141 lb 15.6 oz)   08/01/23 0957 64.7 kg (142 lb 10.2 oz)   07/26/23 1302 67 kg (147 lb 11.3 oz)   07/26/23 0900 66.5 kg (146 lb 9.7 oz)   07/21/23 1445 65.8 kg (145 lb)   07/18/23 0914 66.2 kg (145 lb 15.1 oz)   07/11/23 1023 68.8 kg (151 lb 10.8 oz)   06/19/23 1116 70.3 kg (154 lb 15.7 oz)   06/07/23 1058 62.1 kg (136 lb 12.8 oz)   05/29/23 1120 72.4 kg (159 lb 9.8 oz)   05/26/23 0628 73.4 kg (161 lb 13.1 oz)   05/26/23 0121 70.8 kg (156 " lb 1.4 oz)   05/22/23 1200 73.1 kg (161 lb 2.5 oz)   05/19/23 1526 73.3 kg (161 lb 9.6 oz)          Wt Change Observation 17% weight change over 9 months      Estimated/Assessed Needs  Estimated Needs based on: Current Body Weight       Energy Requirements 30-35 kcal/kg    EST Needs (kcal/day) 9170-5269 kcal/day        Protein Requirements 1.2-1.5 g.kg   EST Daily Needs (g/day) 72-92 g/day        Fluid Requirements 1 ml/kcal    Estimated Needs (mL/day) 1209-0796 ml/day      Labs/Medications         Pertinent Labs Reviewed.   Results from last 7 days   Lab Units 02/08/24 0421 02/07/24  0645 02/07/24  0254 02/06/24  0241 02/05/24  0758 02/05/24  0136   SODIUM mmol/L 139  --  138 139 139 138   SODIUM, ARTERIAL mmol/L  --  138.0  --   --   --   --    POTASSIUM mmol/L 4.0  --  3.8 4.2 4.0 4.5   CHLORIDE mmol/L 105  --  103 105 105 98   CO2 mmol/L 26.9  --  26.2 22.8 23.0 30.2*   BUN mg/dL 7  --  6 9 13 10   CREATININE mg/dL 0.40*  --  0.41* 0.38* 0.59 0.57   CALCIUM mg/dL 9.3  --  8.3* 8.4* 8.4* 9.3   BILIRUBIN mg/dL  --   --   --   --  0.6 0.4   ALK PHOS U/L  --   --   --   --  119* 80   ALT (SGPT) U/L  --   --   --   --  12 9   AST (SGOT) U/L  --   --   --   --  22 15   GLUCOSE mg/dL 97  --  137* 94 115* 126*   GLUCOSE, ARTERIAL mg/dL  --  127*  --   --   --   --      Results from last 7 days   Lab Units 02/08/24 0421 02/07/24  0254 02/06/24  0241   MAGNESIUM mg/dL 1.8 1.8 2.0   PHOSPHORUS mg/dL 3.5 2.6 2.3*   HEMOGLOBIN g/dL 10.6* 10.8* 10.6*   HEMATOCRIT % 34.1 35.1 35.4     COVID19   Date Value Ref Range Status   02/05/2024 Not Detected Not Detected - Ref. Range Final     Lab Results   Component Value Date    HGBA1C 5.90 (H) 11/29/2023         Pertinent Medications Reviewed.     Malnutrition Severity Assessment              Nutrition Diagnosis         Nutrition Dx Problem 1 Increased nutrient needs related to  chronic BLE wounds  as evidenced by  increased calorie and protein needs      Nutrition Intervention            Current Nutrition Orders & Evaluation of Intake       Current PO Diet Diet: Regular/House Diet; Texture: Mechanical Ground (NDD 2); Fluid Consistency: Nectar Thick   Supplement No active supplement orders           Nutrition Intervention/Prescription        Mighty shake BID (Each supplement contains 220 kcal, 6 g protein)-monitor acceptance/need for adjustments, monitor progress with SLP         Medical Nutrition Therapy/Nutrition Education          Learner     Readiness Patient  Education not appropriate at this time     Method     Response Explanation  Needs reinforcement     Monitor/Evaluation        Monitor Supplement intake, Pertinent labs, Skin status, Swallow function     Nutrition Discharge Plan         Recommend to continue oral nutrition supplements on discharge.      Electronically signed by:  Jessica Zelaya RD  02/08/24 09:39 EST

## 2024-02-09 ENCOUNTER — TELEPHONE (OUTPATIENT)
Dept: FAMILY MEDICINE CLINIC | Facility: CLINIC | Age: 59
End: 2024-02-09
Payer: COMMERCIAL

## 2024-02-09 ENCOUNTER — READMISSION MANAGEMENT (OUTPATIENT)
Dept: CALL CENTER | Facility: HOSPITAL | Age: 59
End: 2024-02-09
Payer: COMMERCIAL

## 2024-02-09 VITALS
RESPIRATION RATE: 16 BRPM | OXYGEN SATURATION: 99 % | WEIGHT: 133.16 LBS | HEART RATE: 73 BPM | BODY MASS INDEX: 22.73 KG/M2 | DIASTOLIC BLOOD PRESSURE: 48 MMHG | SYSTOLIC BLOOD PRESSURE: 109 MMHG | TEMPERATURE: 99 F | HEIGHT: 64 IN

## 2024-02-09 LAB
ANION GAP SERPL CALCULATED.3IONS-SCNC: 11.1 MMOL/L (ref 5–15)
BUN SERPL-MCNC: 6 MG/DL (ref 6–20)
BUN/CREAT SERPL: 13 (ref 7–25)
CALCIUM SPEC-SCNC: 9.2 MG/DL (ref 8.6–10.5)
CHLORIDE SERPL-SCNC: 103 MMOL/L (ref 98–107)
CO2 SERPL-SCNC: 26.9 MMOL/L (ref 22–29)
CREAT SERPL-MCNC: 0.46 MG/DL (ref 0.57–1)
DEPRECATED RDW RBC AUTO: 50 FL (ref 37–54)
EGFRCR SERPLBLD CKD-EPI 2021: 111.1 ML/MIN/1.73
ERYTHROCYTE [DISTWIDTH] IN BLOOD BY AUTOMATED COUNT: 14.7 % (ref 12.3–15.4)
GLUCOSE SERPL-MCNC: 102 MG/DL (ref 65–99)
HCT VFR BLD AUTO: 37.5 % (ref 34–46.6)
HGB BLD-MCNC: 11.4 G/DL (ref 12–15.9)
MAGNESIUM SERPL-MCNC: 1.8 MG/DL (ref 1.6–2.6)
MCH RBC QN AUTO: 28 PG (ref 26.6–33)
MCHC RBC AUTO-ENTMCNC: 30.4 G/DL (ref 31.5–35.7)
MCV RBC AUTO: 92.1 FL (ref 79–97)
PHOSPHATE SERPL-MCNC: 4.2 MG/DL (ref 2.5–4.5)
PLATELET # BLD AUTO: 119 10*3/MM3 (ref 140–450)
PMV BLD AUTO: 10.9 FL (ref 6–12)
POTASSIUM SERPL-SCNC: 4.2 MMOL/L (ref 3.5–5.2)
RBC # BLD AUTO: 4.07 10*6/MM3 (ref 3.77–5.28)
SODIUM SERPL-SCNC: 141 MMOL/L (ref 136–145)
WBC NRBC COR # BLD AUTO: 3.59 10*3/MM3 (ref 3.4–10.8)

## 2024-02-09 PROCEDURE — 25010000002 ONDANSETRON PER 1 MG: Performed by: FAMILY MEDICINE

## 2024-02-09 PROCEDURE — 94799 UNLISTED PULMONARY SVC/PX: CPT

## 2024-02-09 PROCEDURE — 80048 BASIC METABOLIC PNL TOTAL CA: CPT | Performed by: INTERNAL MEDICINE

## 2024-02-09 PROCEDURE — 99233 SBSQ HOSP IP/OBS HIGH 50: CPT | Performed by: INTERNAL MEDICINE

## 2024-02-09 PROCEDURE — 94664 DEMO&/EVAL PT USE INHALER: CPT

## 2024-02-09 PROCEDURE — 99239 HOSP IP/OBS DSCHRG MGMT >30: CPT | Performed by: INTERNAL MEDICINE

## 2024-02-09 PROCEDURE — 94761 N-INVAS EAR/PLS OXIMETRY MLT: CPT

## 2024-02-09 PROCEDURE — 25010000002 CEFEPIME PER 500 MG: Performed by: NURSE PRACTITIONER

## 2024-02-09 PROCEDURE — 85027 COMPLETE CBC AUTOMATED: CPT | Performed by: INTERNAL MEDICINE

## 2024-02-09 PROCEDURE — 83735 ASSAY OF MAGNESIUM: CPT | Performed by: INTERNAL MEDICINE

## 2024-02-09 PROCEDURE — 84100 ASSAY OF PHOSPHORUS: CPT | Performed by: INTERNAL MEDICINE

## 2024-02-09 RX ORDER — POLYETHYLENE GLYCOL 3350 17 G
2 POWDER IN PACKET (EA) ORAL AS NEEDED
Qty: 81 EACH | Refills: 0 | Status: SHIPPED | OUTPATIENT
Start: 2024-02-09

## 2024-02-09 RX ORDER — PANTOPRAZOLE SODIUM 40 MG/1
40 TABLET, DELAYED RELEASE ORAL
Qty: 30 TABLET | Refills: 0 | Status: SHIPPED | OUTPATIENT
Start: 2024-02-10 | End: 2024-03-11

## 2024-02-09 RX ORDER — NICOTINE 21 MG/24HR
1 PATCH, TRANSDERMAL 24 HOURS TRANSDERMAL
Qty: 14 PATCH | Refills: 0 | Status: SHIPPED | OUTPATIENT
Start: 2024-02-10 | End: 2024-02-24

## 2024-02-09 RX ORDER — MIDODRINE HYDROCHLORIDE 10 MG/1
10 TABLET ORAL
Qty: 90 TABLET | Refills: 0 | Status: SHIPPED | OUTPATIENT
Start: 2024-02-09 | End: 2024-03-10

## 2024-02-09 RX ORDER — FLUTICASONE FUROATE, UMECLIDINIUM BROMIDE AND VILANTEROL TRIFENATATE 200; 62.5; 25 UG/1; UG/1; UG/1
1 POWDER RESPIRATORY (INHALATION) DAILY
Qty: 28 EACH | Refills: 0 | Status: SHIPPED | OUTPATIENT
Start: 2024-02-09 | End: 2024-03-10

## 2024-02-09 RX ORDER — ALBUTEROL SULFATE 2.5 MG/.5ML
2.5 SOLUTION RESPIRATORY (INHALATION) EVERY 4 HOURS PRN
Qty: 20 ML | Refills: 0 | Status: SHIPPED | OUTPATIENT
Start: 2024-02-09

## 2024-02-09 RX ORDER — LANCETS 33 GAUGE
EACH MISCELLANEOUS
COMMUNITY
Start: 2024-02-06

## 2024-02-09 RX ADMIN — ONDANSETRON 4 MG: 2 INJECTION INTRAMUSCULAR; INTRAVENOUS at 08:33

## 2024-02-09 RX ADMIN — ACETAMINOPHEN 1000 MG: 500 TABLET ORAL at 08:23

## 2024-02-09 RX ADMIN — IPRATROPIUM BROMIDE AND ALBUTEROL SULFATE 3 ML: .5; 3 SOLUTION RESPIRATORY (INHALATION) at 06:47

## 2024-02-09 RX ADMIN — NICOTINE 1 PATCH: 21 PATCH, EXTENDED RELEASE TRANSDERMAL at 08:40

## 2024-02-09 RX ADMIN — IPRATROPIUM BROMIDE AND ALBUTEROL SULFATE 3 ML: .5; 3 SOLUTION RESPIRATORY (INHALATION) at 13:18

## 2024-02-09 RX ADMIN — BUDESONIDE 0.5 MG: 0.5 SUSPENSION RESPIRATORY (INHALATION) at 06:47

## 2024-02-09 RX ADMIN — ACETAMINOPHEN 1000 MG: 500 TABLET ORAL at 14:42

## 2024-02-09 RX ADMIN — Medication 10 ML: at 08:23

## 2024-02-09 RX ADMIN — CLOPIDOGREL BISULFATE 75 MG: 75 TABLET, FILM COATED ORAL at 08:23

## 2024-02-09 RX ADMIN — ALPRAZOLAM 0.5 MG: 0.25 TABLET ORAL at 08:40

## 2024-02-09 RX ADMIN — CEFEPIME 2000 MG: 2 INJECTION, POWDER, FOR SOLUTION INTRAVENOUS at 11:53

## 2024-02-09 RX ADMIN — CEFEPIME 2000 MG: 2 INJECTION, POWDER, FOR SOLUTION INTRAVENOUS at 03:03

## 2024-02-09 RX ADMIN — ARFORMOTEROL TARTRATE 15 MCG: 15 SOLUTION RESPIRATORY (INHALATION) at 06:47

## 2024-02-09 RX ADMIN — CEFEPIME 2000 MG: 2 INJECTION, POWDER, FOR SOLUTION INTRAVENOUS at 18:36

## 2024-02-09 RX ADMIN — MIDODRINE HYDROCHLORIDE 10 MG: 10 TABLET ORAL at 17:02

## 2024-02-09 RX ADMIN — MIDODRINE HYDROCHLORIDE 10 MG: 10 TABLET ORAL at 11:53

## 2024-02-09 RX ADMIN — MIDODRINE HYDROCHLORIDE 10 MG: 10 TABLET ORAL at 08:23

## 2024-02-09 RX ADMIN — PANTOPRAZOLE SODIUM 40 MG: 40 TABLET, DELAYED RELEASE ORAL at 06:02

## 2024-02-09 NOTE — PLAN OF CARE
Problem: Adult Inpatient Plan of Care  Goal: Plan of Care Review  Outcome: Ongoing, Progressing  Flowsheets (Taken 2/9/2024 1817)  Progress: improving  Plan of Care Reviewed With: patient  Outcome Evaluation: Patient alert and oriented throughout shift. VSS. Complaints of pain treated per MAR. IV antibiotics adminstered per MAR. Wound care completed per order. Continuing with plan of care.   Goal Outcome Evaluation:  Plan of Care Reviewed With: patient        Progress: improving  Outcome Evaluation: Patient alert and oriented throughout shift. VSS. Complaints of pain treated per MAR. IV antibiotics adminstered per MAR. Wound care completed per order. Continuing with plan of care.

## 2024-02-09 NOTE — PLAN OF CARE
Goal Outcome Evaluation:  Plan of Care Reviewed With: patient           Outcome Evaluation: Patient has been refusing bipap, unit remains at bedside for as needed use. Currently on 2.5L nasal cannula tolerating well.

## 2024-02-09 NOTE — SIGNIFICANT NOTE
02/09/24 0945   Coping/Psychosocial   Observed Emotional State calm;cooperative   Verbalized Emotional State acceptance;hopefulness   Trust Relationship/Rapport empathic listening provided   Involvement in Care interacting with patient   Additional Documentation Spiritual Care (Group)   Spiritual Care   Use of Spiritual Resources non-Mosque use of spiritual care   Spiritual Care Source  initiative   Spiritual Care Follow-Up follow-up, none required as presently assessed   Response to Spiritual Care other (see comments)  (The Pt says she doesn't belive in some spirtiual thems. we talk about them.)   Spiritual Care Interventions supportive conversation provided   Spiritual Care Visit Type initial   Receptivity to Spiritual Care visit welcomed

## 2024-02-09 NOTE — CASE MANAGEMENT/SOCIAL WORK
Overnight oximetry results reviewed.      Ms. Llanes did not have significant desaturation during overnight sleep study performed on 2L NC.  Total desaturation time was 18 seconds.      Patient will not qualify for home bipap with these results.    Ms. Llanes would qualify for NIV with Astral with a diagnosis of CHRONIC respiratory failure and notation that bipap has been tried and failed.    Will discuss with pulmonary.

## 2024-02-09 NOTE — PLAN OF CARE
Problem: Adult Inpatient Plan of Care  Goal: Plan of Care Review  Outcome: Ongoing, Progressing  Flowsheets (Taken 2/9/2024 3790)  Progress: no change  Plan of Care Reviewed With: patient  Outcome Evaluation: Patient alert and oriented through out shift. Complaint of pain treated per MAR. Complaint of anxiety treated per MAR. Sleep study done by RT, See results. IV antibioitics administered per orders. Continuing with plan of care.   Goal Outcome Evaluation:  Plan of Care Reviewed With: patient        Progress: no change  Outcome Evaluation: Patient alert and oriented through out shift. Complaint of pain treated per MAR. Complaint of anxiety treated per MAR. Sleep study done by RT, See results. IV antibioitics administered per orders. Continuing with plan of care.  PA notified of complaint of headache this morning, no new orders at this time.

## 2024-02-09 NOTE — CONSULTS
Spoke to patient regarding home oxygen supplies.  Patient is current with with [employer] for home oxygen.  Concentrator maintenance and performed 4/7/23 and last bill date was this month (February). Patient states she has not been using her oxygen because she put it into storage due to orourke infestation.  Ms. Llanes has asked to have [employer]  concentrator so [employer] can assume billing in March.  [employer] rep aware patient is currently being billed for O2 with [employer].    Spirometry completed at bedside.  PARAMETER BEST % PREDICTED   FVC L 2.13 66   FEV1 L 1.49 59   FEV1/FVC  70      Ms. Llanes will need noninvasive ventilation with Astral for acute on chronic hypercapnic respiratory failure (CO2 51.5) secondary to COPD.  Bilevel, Bilevel ST, ASV and Bilevel with VAPS have been tried, considered and ruled out. The patient requires IVAPS AE mode of ventilation that is not available on less costly bilevel devices.  A noninvasive ventilator is necessary to deliver targeted tidal volume and minute ventilation to ensure adequate ventilation and provide backup rate, alarms and battery backup necessary for patient safety.  2024 GOLD guidelines state noninvasive mechanical ventilation should be the first mode of ventilation used in COPD with respiratory failure to decrease hospitalization and improve survival.  Failure to follow recommended guidelines will place the patient at increased risk of unplanned expensive rehospitalization, acute on chronic respiratory failure and mortality.    Ms Llanes was recently hospitalized with acute on chronic respiratory failure secondary to COPD. She will require maximized maintenance therapy with LABA, LAMA and inhaled corticosteroid for her COPD on discharge. 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  Patient states she has previously tried and failed Symbicort.     [employer] submitted to cover my meds: (Key: [id number]) - [id number]  Approved      Discussion of the COPD zones (Green/Yellow/Red) was competed with emphasizes on what to do in the yellow and red zones. COPD action plan was reviewed with instruction on rescue and maintenance medications, the function of each and the importance of using them as prescribed. MDI administration with a spacer was instructed; patient was provided with spacer for discharge. Home oxygen prescription was also reviewed with patient.  MsWeiser was instructed to use NIPPV with all sleep and naps.

## 2024-02-09 NOTE — THERAPY EVALUATION
Walking Oximetry Progress Note      Patient Name:  Isha Llanes  YOB: 1965  Date of Procedure: 02/09/24              ROOM AIR BASELINE   SpO2% 90   Heart Rate 78       EXERCISE ON ROOM AIR SpO2% EXERCISE ON O2 LPM SpO2%   1 MINUTE 84 1 MINUTE 2 96   2 MINUTES  2 MINUTES 2 96   3 MINUTES  3 MINUTES 2 97   4 MINUTES  4 MINUTES 2 9796   5 MINUTES  5 MINUTES 2    6 MINUTES  6 MINUTES 2 96              Time to Recovery  1 MINUTES   SpO2% Post Exercise  97 on 2LPM.    HR Post Exercise  85     Comments: Patient had oxygen off when I arrived to the room, patient only wears PRN at home.  Patient saturations were at 90%, had patient get up and walk to hold onto wheelchair.  Initially dropped to 87% and then continued to drop saturations to 84% placed patient on 2lpm nasal cannula and her saturations recovered in a minute to 96%.  Patient remained at 96 to 97% throughout rest of walk.          Electronically signed by Kortney Drake RRT, 02/09/24, 10:34 AM EST.

## 2024-02-09 NOTE — PROGRESS NOTES
Pulmonary / Critical Care Progress Note      Patient Name: Isha Llanes  : 1965  MRN: [id number]  Attending:  Virgil Navarro MD   Date of admission: 2024    Subjective   Subjective   Follow-up for septic shock, acute hypoxic hypercarbic respiratory failure    Over past 24 hours, transferred out of ICU.  Remains on cefepime, continues on nebs.    No acute events overnight.    This morning,  Sitting on side of bed  Overall feeling better  Currently on room air  Dyspnea and cough improved  Reports headache with nausea vomiting  Completed overnight pulse oximetry  No fever or chills  Remains weak fatigued  Requesting to be discharged home      Review of Systems  Constitutional symptoms: + General pain, denied complaints   Ear, nose, throat: Denied complaints  Cardiovascular:  Denied complaints  Respiratory: Denied complaints  Gastrointestinal: + Abdominal pain, denied complaints  Musculoskeletal: Denied complaints  Neurologic: Denied complaints  Skin: Denied complaints      Objective   Objective     Vitals:   Vital signs for last 24 hours:  Temp:  [97.9 °F (36.6 °C)-98.3 °F (36.8 °C)] 98.2 °F (36.8 °C)  Heart Rate:  [63-93] 93  Resp:  [16-20] 16  BP: ()/(48-87) 128/54    Physical Exam   Vital Signs Reviewed   General: Chronically ill-appearing, female, alert, NAD, sitting on side of bed  HEENT:  PERRL, EOMI.  OP, nares clear  Chest:  good aeration, rhonchi to auscultation bilaterally, tympanic to percussion bilaterally, no work of breathing noted on room air  CV: RRR, no MGR, pulses 2+, equal.  Abd:  Soft, NT, ND, + BS, no HSM  EXT:  no clubbing, no cyanosis, no edema  Neuro:  A&Ox3, CN grossly intact, no focal deficits.  Skin: No rashes or lesions noted, chronic changes of bilateral lower extremities    Result Review    Result Review:  I have personally reviewed the results from the time of this admission to 2024 07:31 EST and agree with these findings:  [x]  Laboratory  [x]   Microbiology  [x]  Radiology  []  EKG/Telemetry   []  Cardiology/Vascular   []  Pathology  []  Old records  []  Other:  Most notable findings include:     Blood culture 2/2 BCID Enterobacterales species        Lab 02/08/24  0421 02/07/24  0645 02/07/24  0254 02/06/24  0241 02/05/24  0758 02/05/24  0136 02/05/24  0113   WBC 2.04*  --  3.95 5.23 12.08*  --  8.50   HEMOGLOBIN 10.6*  --  10.8* 10.6* 13.3  --  12.5   HEMATOCRIT 34.1  --  35.1 35.4 44.6  --  39.7   PLATELETS 91*  --  98* 97* 120*  --  150   SODIUM 139  --  138 139 139 138  --    SODIUM, ARTERIAL  --  138.0  --   --   --   --   --    POTASSIUM 4.0  --  3.8 4.2 4.0 4.5  --    CHLORIDE 105  --  103 105 105 98  --    CO2 26.9  --  26.2 22.8 23.0 30.2*  --    BUN 7  --  6 9 13 10  --    CREATININE 0.40*  --  0.41* 0.38* 0.59 0.57  --    GLUCOSE 97  --  137* 94 115* 126*  --    GLUCOSE, ARTERIAL  --  127*  --   --   --   --   --    CALCIUM 9.3  --  8.3* 8.4* 8.4* 9.3  --    PHOSPHORUS 3.5  --  2.6 2.3*  --  4.5  --    TOTAL PROTEIN  --   --   --   --  6.3 7.5  --    ALBUMIN  --   --   --   --  3.4* 4.0  --    GLOBULIN  --   --   --   --  2.9 3.5  --      Assessment & Plan   Assessment / Plan     Active Hospital Problems:  Active Hospital Problems    Diagnosis     Sepsis      Impression:  Septic shock, present on admission  Enterobacter bacteremia  Pneumonia of unspecified organism  Acute on chronic hypercarbic respiratory failure  Acute hypoxic respiratory failure  COPD with acute exacerbation  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  History of PEG tube placement  History of non-Hodgkin's lymphoma  History of severe aortic stenosis s/p TAVR  History of MSSA cellulitis  Type 2 diabetes       Plan:  -Continue supplemental oxygen, wean for SpO2 greater than 90  -6 MWT completed.  Patient will require supplemental O2 at discharge.  -Recommend NIPPV 14/7 with naps and at night.  -Overnight pulse oximetry without significant  desaturations.  -RT  on board for home NIPPV.  Appreciate assistance.    -Continue bronchopulmonary hygiene, bronchodilator protocol  -Continue Brovana, Pulmicort and DuoNebs  -SLP on board.  Appreciate assistance.  -Continue aspiration precautions.  Elevate head of bed.  -Patient does have numerous allergies, reviewed history with pharmacy has previously tolerated cefepime, continue 14 days for bacteremia  -Blood culture 2/2 with Enterobacter species, continue cefepime for 14 days total from clearance  -Repeat blood cultures NGTD  -Previous echocardiogram 5/2023 EF 46 to 50%, TAVR,  -Wound care following for chronic lower extremity wounds, appreciate assistance  -Patient has pain pump, pain management per primary  -Trend electrolytes and renal panel.  Replace electrolytes as needed.  -Encourage mobilization.  Out of bed to chair.  -PT/OT on board.  Appreciate assistance.  -Follow-up with pulmonology scheduled on 2/15/2024    Ms. Llanes will need noninvasive ventilation with Astral for acute on chronic hypercapnic respiratory failure (CO2 51.5) secondary to COPD.  Bilevel, Bilevel ST, ASV and Bilevel with VAPS have been tried, considered and ruled out. The patient requires IVAPS AE mode of ventilation that is not available on less costly bilevel devices.  A noninvasive ventilator is necessary to deliver targeted tidal volume and minute ventilation to ensure adequate ventilation and provide backup rate, alarms and battery backup necessary for patient safety.  2024 GOLD guidelines state noninvasive mechanical ventilation should be the first mode of ventilation used in COPD with respiratory failure to decrease hospitalization and improve survival.  Failure to follow recommended guidelines will place the patient at increased risk of unplanned expensive rehospitalization, acute on chronic respiratory failure and mortality.       DVT prophylaxis:  Medical and mechanical DVT prophylaxis orders are  present.        CODE STATUS:   Code Status (Patient has no pulse and is not breathing): CPR (Attempt to Resuscitate)  Medical Interventions (Patient has pulse or is breathing): Full Support    I have reviewed labs, imaging, pertinent clinical data and provider notes.   I have discussed with bedside nurse and primary service.     Electronically signed by Yoseph Jacobs MD, 02/09/24, 7:31 AM EST.  Electronically signed by LUZ Lawson, 02/09/24, 1:41 PM EST.    This visit was performed by BOTH a physician and an APC. I personally evaluated and examined the patient. I performed all aspects of MDM as documented. , I have reviewed and confirmed the accuracy of the patient's history as documented in this note., and I have reexamined the patient and the results are consistent with the previously documented exam. I have updated the documentation as necessary.     Electronically signed by Yoseph Jacobs MD, 02/09/24, 2:54 PM EST.

## 2024-02-09 NOTE — DISCHARGE SUMMARY
[facility]         HOSPITALIST  DISCHARGE SUMMARY    Patient Name: Isha Llanes  : 1965  MRN: [id number]    Date of Admission: 2024  Date of Discharge:  24  Primary Care Physician: Katia Wright MD    Consultants:  -Pulmonology: Dr. Cherelle Mitchell, Dr. 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  Patient was found to be febrile and hypotensive and hypoxic.  Pro-Ge was noted to be elevated on labs.  CT abdomen pelvis showed possible bibasilar pneumonia.  Hospitalist service contacted for further evaluation and management.  Patient started on antibiotic treatment, Levophed and admitted to ICU level of care.  Pulmonology consulted.  Patient placed on BiPAP.  Patient's condition improved and patient able to wean off of Levophed and transition to nasal cannula.  Patient's home meds and exposures found and patient had decreasing level of consciousness and increased supplemental O2 requirements so patient remained in ICU level of care.  Patient had to be placed back on Levophed as well.  Midodrine started and Levophed weaned as tolerated.  Patient's blood  pressure stabilized and patient able to stay off of Levophed.  Based on blood culture results antibiotics tailored accordingly and patient will continue on IV cefepime for 14 days of antibiotic treatment, it is recommended that repeat blood cultures to be obtained 3 days after completion of IV antibiotic therapy to document persistent clearance.  Prior to discharge home patient evaluated for home O2 needs and failed walk test.  Additionally patient set up with noninvasive ventilation for home given her acute on chronic hypercapnic respiratory failure secondary to COPD.  Patient also will discharge on Trelegy per pulmonology recommendation.  Given patient's medical comorbidities she is high risk for readmission.  On day of discharge patient hemodynamically stable and no additional inpatient evaluation or workup necessary at this time, patient will discharge home with home health and is to follow-up with PCP and pulmonology.    DISCHARGE Follow Up Recommendations for labs and diagnostics:   -Follow-up with PCP in 3 to 5 days  -Follow-up with pulmonology in 1 to 2 weeks    Day of Discharge     Vital Signs:  Temp:  [97.9 °F (36.6 °C)-98.6 °F (37 °C)] 98.6 °F (37 °C)  Heart Rate:  [63-96] 74  Resp:  [16-20] 16  BP: ()/(48-76) 99/66  Flow (L/min):  [2.5] 2.5  Physical Exam:   Gen: Chronically ill-appearing female, sitting up in bed, conversant  Resp: Improved aeration, equal chest rise bilaterally, normal respiratory effort  Card: RRR, No m/r/g  Abd: Soft, Nontender, Nondistended, + bowel sounds     Discharge Details        Discharge Medications        New Medications        Instructions Start Date   cefepime 2 G/ ML solution  Commonly known as: MAXIPIME   2,000 mg, Intravenous, Every 8 Hours      midodrine 10 MG tablet  Commonly known as: PROAMATINE   10 mg, Oral, 3 Times Daily Before Meals      nicotine 21 MG/24HR patch  Commonly known as: NICODERM CQ   1 patch, Transdermal, Every 24 Hours Scheduled   Start  Date: February 10, 2024     nicotine polacrilex 2 MG lozenge  Commonly known as: COMMIT   2 mg, Mouth/Throat, As Needed      pantoprazole 40 MG EC tablet  Commonly known as: PROTONIX   40 mg, Oral, Every Early Morning   Start Date: February 10, 2024     Goldie Ellipta 200-62.5-25 MCG/ACT aerosol powder   Generic drug: Fluticasone-Umeclidin-Vilant   1 puff, Inhalation, Daily             Changes to Medications        Instructions Start Date   albuterol sulfate  (90 Base) MCG/ACT inhaler  Commonly known as: PROVENTIL HFA;VENTOLIN HFA;PROAIR HFA  What changed: Another medication with the same name was added. Make sure you understand how and when to take each.   2 puffs, Inhalation, Every 4 Hours PRN      albuterol 2.5 MG/0.5ML nebulizer solution  Commonly known as: PROVENTIL  What changed: You were already taking a medication with the same name, and this prescription was added. Make sure you understand how and when to take each.   2.5 mg, Nebulization, Every 4 Hours PRN             Continue These Medications        Instructions Start Date   ALPRAZolam 0.5 MG tablet  Commonly known as: Xanax   0.5 mg, Oral, 2 Times Daily PRN      bacitracin 500 UNIT/GM ointment   0.9 g, Topical, 2 Times Daily      cetirizine 10 MG tablet  Commonly known as: zyrTEC   1 tablet, Oral, Daily      clopidogrel 75 MG tablet  Commonly known as: PLAVIX   1 tablet, Oral, Daily      furosemide 40 MG tablet  Commonly known as: LASIX   40 mg, Oral, Daily      gabapentin 300 MG capsule  Commonly known as: NEURONTIN   300 mg, Oral, 4 Times Daily      HYDROcodone-acetaminophen 5-325 MG per tablet  Commonly known as: NORCO   1 tablet, Oral, Daily PRN      hydroxychloroquine 200 MG tablet  Commonly known as: PLAQUENIL   200 mg, Oral, Daily      mupirocin 2 % ointment  Commonly known as: BACTROBAN   1 application , Topical, 3 Times Daily      ondansetron 4 MG tablet  Commonly known as: Zofran   4 mg, Oral, Every 12 Hours PRN      OneTouch Delica  Plus [id number] misc       pain patient supplied pump   Intrathecal, Continuous,  Type of Medication: Hydromorphone 15 mg/ml  and Bupivacaine 0.5 mg/ml Provider:Dr. Boudreaux Provider number: [phone] Basal Dose: Hydromorphone 7.518 mg/day Bupivacaine 0.80213 mg/day ( MAX of Hydromorphone 9.940 mg/ day; Bupivacaine 0.78822 mg /day) Bolus Dose:Hydromorphone 0.500 mg, Bupivacaine 0.32368 mg Lockout 3 hours. 1 Bolus per every 3 hours max of 5 bolus per day Next refill/ Alarm Date- 03/25/2024 Pump manufacture:[employer]                 ASK your doctor about these medications        Instructions Start Date   predniSONE 5 MG tablet  Commonly known as: DELTASONE  Ask about: Should I take this medication?   5 mg, Oral, Daily               Allergies   Allergen Reactions    Amoxicillin Shortness Of Breath     Has tolerated Cefazolin, Ceftriaxone, and Cefepime -[facility]    Ceclor [Cefaclor] Shortness Of Breath     Has tolerated Cefazolin, Ceftriaxone, and Cefepime -[facility]      Penicillins Shortness Of Breath     Has tolerated Cefazolin, Ceftriaxone, and Cefepime -[facility]    Sulfa Antibiotics Rash    Valium [Diazepam] Mental Status Change     DEPRESSED    Ambien [Zolpidem] Mental Status Change    Aspirin GI Intolerance    Ativan [Lorazepam] Mental Status Change    Benadryl [Diphenhydramine] Anxiety     AND MAKES HER HYPER    Biaxin [Clarithromycin] Unknown - Low Severity    Cephalexin Unknown - Low Severity    Clindamycin Unknown - Low Severity    Compazine [Prochlorperazine Edisylate] Rash    Contrast Dye (Echo Or Unknown Ct/Mr) Other (See Comments)     Caused pain in her arm    Doxycycline Rash    Nsaids GI Intolerance    Phenergan [Promethazine Hcl] GI Intolerance    Promethazine GI Intolerance    Vancomycin Itching       Discharge Disposition:  Home-Health Care c    Diet:  Hospital:  Diet Order   Procedures    Diet: Regular/House Diet; Texture: Mechanical Ground (NDD 2); Fluid  Consistency: Nectar Thick       Discharge Activity:   Activity Instructions       Activity as Tolerated              CODE STATUS:  Code Status and Medical Interventions:   Ordered at: 02/05/24 0538     Code Status (Patient has no pulse and is not breathing):    CPR (Attempt to Resuscitate)     Medical Interventions (Patient has pulse or is breathing):    Full Support       Future Appointments   Date Time Provider Department Center   2/15/2024  9:00 AM Selina Rowe APRN [facility]   3/21/2024 10:30 AM Goyo Nunez MD [facility]   4/9/2024 11:00 AM Katia Wright MD [facility]       Additional Instructions for the Follow-ups that You Need to Schedule       Discharge Follow-up with PCP   As directed       Currently Documented PCP:    Katia Wright MD    PCP Phone Number:    [phone]     Follow Up Details: Follow-up in 3-5 days. Recommend repeat blood cultures be obtained 3 days after completion of IV antibiotics.                Pertinent  and/or Most Recent Results     RADIOLOGY:  XR Abdomen KUB [[id number]] Akil as Reviewed   Order Status: Completed Collected: 02/07/24 1754    Updated: 02/07/24 1757   Narrative:     PROCEDURE: XR ABDOMEN KUB     COMPARISON: [facility], CT, CT ABDOMEN PELVIS WO CONTRAST, 2/05/2024, 2:25.     INDICATIONS: abdominal pain     FINDINGS:  The stomach is not abnormally distended.  No abnormality is seen at the lung bases.     The bowel gas pattern is unremarkable.  No significant stool is seen.  No abnormal distension is  evident.     Infusion pump in the right posterior flank region is in unchanged position.  The catheter is not  well visualized.     Degenerative changes are seen in the hips.     Prosthetic aortic valve is again seen.      Impression:       KUB demonstrating an unremarkable bowel gas pattern.     Infusion pump in the right posterior flank region is in unchanged position.            MANISH LIM MD        Electronically Signed and  Approved By: MANISH LIM MD on 2/07/2024 at 17:54                   FL Video Swallow With Speech Single Contrast [[id number]] Akil as Reviewed   Order Status: Completed Collected: 02/06/24 1336    Updated: 02/06/24 1659   Narrative:     PROCEDURE: FL VIDEO SWALLOW W SPEECH SINGLE-CONTRAST     COMPARISON: [facility], CR, FL VIDEO SWALLOW W SPEECH SINGLE-CONTRAST, 8/10/2023,  11:04.     INDICATIONS: h/o dysphagia, recent PEG removal, 2.0 minutes fluoro time, 19.3 mGy, 12 images     TECHNIQUE: Examination was performed in conjunction with the speech pathologist. The patient was  given both thin and nectar thick liquid barium, applesauce with barium, contrast and herminia cracker  with Esophotrast. The patient's medication list was reviewed and documented in the medical record.     FINDINGS:  Fluoroscopic examination was performed in conjunction with speech pathology department.  Various  consistencies of barium were given to assess the swallow mechanism.  Patient experienced deep  laryngeal penetration with thin liquid barium, not improved with incorporating chin-tuck maneuver.    Patient experienced laryngeal penetration with nectar thick liquid barium.  No tracheal aspiration  appreciated on exam.  The exam was discussed in real-time by myself and the speech pathologist.  Please consult speech pathology report for further details regarding exam.      Impression:          1. Deep laryngeal penetration with thin liquid barium  2. Laryngeal penetration with nectar thick liquid barium     Please consult speech pathology report for further details regarding exam and dietary  recommendations         HAKEEM WEI        Electronically Signed and Approved By: CHANDAN WEEKS MD on 2/06/2024 at 16:56                   CT Abdomen Pelvis Without Contrast [[id number]] Akil as Reviewed   Order Status: Completed Collected: 02/05/24 0437    Updated: 02/05/24 0440   Narrative:     PROCEDURE: CT ABDOMEN PELVIS WO  CONTRAST     COMPARISONS: 9/5/2023; 5/26/2023.     INDICATIONS: Abdominal pain, not otherwise specified.     TECHNIQUE:  744 CT images were created without intravenous or oral contrast agent administration.  Despite best  efforts, poor image quality limits interpretation of the study, especially related to motion  artifact, the patient's upper extremities in the scan field of view, and other external densities  in the scan field of view.  This study was not tailored to evaluate the patient's upper  extremities.  Also, there is a suspected intrathecal pain pump delivery system in place with  associated streak artifact, especially related to the pump reservoir, which is implanted in the  right posterior abdominal wall.     PROTOCOL:   Standard imaging protocol performed.     RADIATION:   Total DLP: 845.2 mGy*cm.    Automated exposure control was utilized to minimize radiation dose.     FINDINGS:  Again, motion artifact obscures detail on the exam.  Bibasilar infiltrates are possible.  These  findings may represent pneumonia, including aspiration pneumonia.  Pulmonary edema is possible but  thought to be less likely.  Atelectasis alone is thought to be least likely.  Diffuse hepatic  steatosis is noted with borderline hepatomegaly.  The spleen measures 13.3 cm in maximum  craniocaudal diameter.  Borderline splenomegaly is possible.  Degenerative changes involve the  imaged spine and the bilateral hip joints.  There is chronic calcified granulomatous disease of the  chest and abdomen.  No hydronephrosis or obstructive uropathy or definite ureteral calculus.  No  definite urinary bladder calculi.  No definite acute pancreatitis.  The patient has undergone  cholecystectomy and hysterectomy.  The appendix is seen on image 155 of series 202 and adjacent  images and is without acute abnormality.  Atherosclerotic changes are seen without aneurysmal  dilatation the aortoiliac arterial system.  No other acute findings are  suggested.      Impression:       Possible bibasilar pneumonia, including aspiration pneumonia.  No other definite acute findings are  seen.  Again, the study is very limited.  Please see above comments for further detail.          Please note that portions of this note were completed with a voice recognition program.     JEN JONES JR, MD        Electronically Signed and Approved By: JEN JONES JR, MD on 2/05/2024 at 4:37                   XR Chest 1 View [[id number]] Akil as Reviewed   Order Status: Completed Collected: 02/05/24 0326    Updated: 02/05/24 0329   Narrative:     PROCEDURE: XR CHEST 1 VW     COMPARISON: 9/5/2023 (chest CT); 5/28/2023 (CXR).     INDICATIONS: Possible sepsis.     FINDINGS: A single AP upright portable view of the chest is provided for review.  Bilateral  infiltrates are seen.  The findings may represent pulmonary edema; infectious multifocal pneumonia  cannot be excluded but is thought to be less likely.  No cardiomediastinal enlargement.  No  pneumothorax.  No pneumomediastinum.  No pleural effusion.  External artifacts obscure detail.  The  patient has undergone transcatheter aortic valve replacement (TAVR).  There is a left IJ central  venous line in place with its distal tip in the expected lower superior cava (SVC), seen  previously.  The nasoenteral feeding tube has been removed. The thoracic aorta is atherosclerotic.  Chronic calcified granulomatous disease involves the chest.        Impression:     Decreased bilateral infiltrates are seen since the prior chest radiographic exam of  5/28/2023.  The remaining infiltrates may represent mild pulmonary edema with vascular congestion.    Infectious multifocal pneumonia cannot be excluded but is thought to be less likely.                Please note that portions of this note were completed with a voice recognition program.     JEN JONES JR, MD        Electronically Signed and Approved By: JEN JONES JR, MD on 2/05/2024  at 3:26           LAB RESULTS:      Lab 02/09/24  0904 02/08/24  0421 02/07/24  0645 02/07/24  0254 02/06/24 0241 02/05/24  0758 02/05/24  0444 02/05/24  0136 02/05/24  0113   WBC 3.59 2.04*  --  3.95 5.23 12.08*  --   --  8.50   HEMOGLOBIN 11.4* 10.6*  --  10.8* 10.6* 13.3  --   --  12.5   HEMATOCRIT 37.5 34.1  --  35.1 35.4 44.6  --   --  39.7   PLATELETS 119* 91*  --  98* 97* 120*  --   --  150   NEUTROS ABS  --   --   --   --   --  11.50*  --   --  7.46*   IMMATURE GRANS (ABS)  --   --   --   --   --  0.05  --   --  0.04   LYMPHS ABS  --   --   --   --   --  0.24*  --   --  0.39*   MONOS ABS  --   --   --   --   --  0.23  --   --  0.54   EOS ABS  --   --   --   --   --  0.03  --   --  0.06   MCV 92.1 89.7  --  90.0 91.7 93.3  --   --  89.0   CRP  --   --   --   --   --   --   --  11.43*  --    PROCALCITONIN  --   --   --   --   --   --  16.45*  --   --    LACTATE  --   --   --   --   --  1.1  --   --  0.8   LACTATE, ARTERIAL  --   --  0.81  --   --   --   --   --   --    PROTIME  --   --   --   --   --   --   --   --  14.3   APTT  --   --   --   --   --   --   --   --  29.9         Lab 02/09/24  0904 02/08/24 0421 02/07/24 0645 02/07/24 0254 02/06/24 0241 02/05/24 0758 02/05/24  0136   SODIUM 141 139  --  138 139 139 138   SODIUM, ARTERIAL  --   --  138.0  --   --   --   --    POTASSIUM 4.2 4.0  --  3.8 4.2 4.0 4.5   CHLORIDE 103 105  --  103 105 105 98   CO2 26.9 26.9  --  26.2 22.8 23.0 30.2*   ANION GAP 11.1 7.1  --  8.8 11.2 11.0 9.8   BUN 6 7  --  6 9 13 10   CREATININE 0.46* 0.40*  --  0.41* 0.38* 0.59 0.57   EGFR 111.1 114.9  --  114.2 116.3 104.6 105.5   GLUCOSE 102* 97  --  137* 94 115* 126*   GLUCOSE, ARTERIAL  --   --  127*  --   --   --   --    CALCIUM 9.2 9.3  --  8.3* 8.4* 8.4* 9.3   IONIZED CALCIUM  --   --  1.20  --   --   --   --    MAGNESIUM 1.8 1.8  --  1.8 2.0 1.6 1.9   PHOSPHORUS 4.2 3.5  --  2.6 2.3*  --  4.5         Lab 02/06/24  0241 02/05/24  0758 02/05/24  0136   TOTAL PROTEIN  --   6.3 7.5   ALBUMIN  --  3.4* 4.0   GLOBULIN  --  2.9 3.5   ALT (SGPT)  --  12 9   AST (SGOT)  --  22 15   BILIRUBIN  --  0.6 0.4   ALK PHOS  --  119* 80   LIPASE 10*  --   --          Lab 02/05/24  0444 02/05/24  0136 02/05/24  0113   PROBNP  --  650.2  --    HSTROP T 52* 26*  --    PROTIME  --   --  14.3   INR  --   --  1.09                 Lab 02/07/24  0645 02/05/24  0922 02/05/24  0159   PH, ARTERIAL 7.361 7.285* 7.307*   PCO2, ARTERIAL 51.5* 51.5* 45.6*   PO2 ART 95.2 78.6* 56.3*   O2 SATURATION ART 97.0 94.4* 84.6*   FIO2 28 30 36   HCO3 ART 28.5* 23.9 22.3   BASE EXCESS ART 2.3* -3.2* -4.1*   CARBOXYHEMOGLOBIN 0.6 2.4* 4.7*     Brief Urine Lab Results  (Last result in the past 365 days)        Color   Clarity   Blood   Leuk Est   Nitrite   Protein   CREAT   Urine HCG        02/05/24 0420 Yellow   Cloudy   Small (1+)   Moderate (2+)   Negative   Negative                 Microbiology Results (last 10 days)       Procedure Component Value - Date/Time    Blood Culture - Blood, Arm, Right [[id number]]  (Normal) Collected: 02/06/24 1148    Lab Status: Preliminary result Specimen: Blood from Arm, Right Updated: 02/09/24 1200     Blood Culture No growth at 3 days    Blood Culture - Blood, Arm, Right [[id number]]  (Normal) Collected: 02/06/24 1025    Lab Status: Preliminary result Specimen: Blood from Arm, Right Updated: 02/09/24 1045     Blood Culture No growth at 3 days    Legionella Antigen, Urine - Urine, Urine, Clean Catch [[id number]]  (Normal) Collected: 02/05/24 1356    Lab Status: Final result Specimen: Urine, Clean Catch Updated: 02/05/24 1439     LEGIONELLA ANTIGEN, URINE Negative    S. Pneumo Ag Urine or CSF - Urine, Urine, Clean Catch [[id number]]  (Normal) Collected: 02/05/24 1356    Lab Status: Final result Specimen: Urine, Clean Catch Updated: 02/05/24 1439     Strep Pneumo Ag Negative    Respiratory Panel PCR w/COVID-19(SARS-CoV-2) TANIKA/MARY/MINNIE/PAD/COR/ELLEN In-House, NP Swab in UTM/VTM, 2 HR TAT - Swab,  Nasopharynx [[id number]]  (Normal) Collected: 02/05/24 0917    Lab Status: Final result Specimen: Swab from Nasopharynx Updated: 02/05/24 1038     ADENOVIRUS, PCR Not Detected     Coronavirus 229E Not Detected     Coronavirus HKU1 Not Detected     Coronavirus NL63 Not Detected     Coronavirus OC43 Not Detected     COVID19 Not Detected     Human Metapneumovirus Not Detected     Human Rhinovirus/Enterovirus Not Detected     Influenza A PCR Not Detected     Influenza B PCR Not Detected     Parainfluenza Virus 1 Not Detected     Parainfluenza Virus 2 Not Detected     Parainfluenza Virus 3 Not Detected     Parainfluenza Virus 4 Not Detected     RSV, PCR Not Detected     Bordetella pertussis pcr Not Detected     Bordetella parapertussis PCR Not Detected     Chlamydophila pneumoniae PCR Not Detected     Mycoplasma pneumo by PCR Not Detected    Narrative:      In the setting of a positive respiratory panel with a viral infection PLUS a negative procalcitonin without other underlying concern for bacterial infection, consider observing off antibiotics or discontinuation of antibiotics and continue supportive care. If the respiratory panel is positive for atypical bacterial infection (Bordetella pertussis, Chlamydophila pneumoniae, or Mycoplasma pneumoniae), consider antibiotic de-escalation to target atypical bacterial infection.    MRSA Screen, PCR (Inpatient) - Swab, Nares [[id number]]  (Normal) Collected: 02/05/24 0836    Lab Status: Final result Specimen: Swab from Nares Updated: 02/05/24 1008     MRSA PCR No MRSA Detected    Narrative:      The negative predictive value of this diagnostic test is high and should only be used to consider de-escalating anti-MRSA therapy. A positive result may indicate colonization with MRSA and must be correlated clinically.    Respiratory Culture - Sputum, Cough [[id number]] Collected: 02/05/24 0827    Lab Status: Final result Specimen: Sputum from Cough Updated: 02/07/24 0921      Respiratory Culture Heavy growth (4+) Normal respiratory mary. No S. aureus or Pseudomonas aeruginosa detected. Final report.     Gram Stain Rare (1+) WBCs seen      Rare (1+) Gram negative bacilli      Few (2+) Gram positive cocci      Few (2+) Gram positive bacilli      Rare (1+) Yeast    COVID-19, FLU A/B, RSV PCR 1 HR TAT - Swab, Nasopharynx [[id number]]  (Normal) Collected: 02/05/24 0147    Lab Status: Final result Specimen: Swab from Nasopharynx Updated: 02/05/24 0242     COVID19 Not Detected     Influenza A PCR Not Detected     Influenza B PCR Not Detected     RSV, PCR Not Detected    Narrative:      Fact sheet for providers: https://www.fda.gov/media/825584/download    Fact sheet for patients: https://www.fda.gov/media/073937/download    Test performed by PCR.    Blood Culture - Blood, Arm, Left [[id number]]  (Abnormal)  (Susceptibility) Collected: 02/05/24 0112    Lab Status: Preliminary result Specimen: Blood from Arm, Left Updated: 02/09/24 0650     Blood Culture Pseudomonas luteola     Comment: Pip/Tazo to follow        Isolated from Aerobic and Anaerobic Bottles     Gram Stain Aerobic Bottle Gram negative bacilli      Anaerobic Bottle Gram negative bacilli    Susceptibility        Pseudomonas luteola      MARTHA (Preliminary)      Cefepime Susceptible      Ceftazidime Susceptible      Gentamicin Susceptible      Levofloxacin Susceptible      Meropenem Susceptible      Tetracycline Susceptible      Trimethoprim + Sulfamethoxazole Susceptible                           Blood Culture - Blood, Arm, Right [[id number]]  (Abnormal) Collected: 02/05/24 0112    Lab Status: Preliminary result Specimen: Blood from Arm, Right Updated: 02/09/24 0650     Blood Culture Pseudomonas luteola     Isolated from Aerobic and Anaerobic Bottles     Gram Stain Aerobic Bottle Gram negative bacilli      Anaerobic Bottle Gram negative bacilli    Narrative:      Refer to previous blood culture collected on 02/05/2024 0112 for MICs     Blood Culture ID, PCR - Blood, Arm, Left [[id number]]  (Abnormal) Collected: 02/05/24 0112    Lab Status: Final result Specimen: Blood from Arm, Left Updated: 02/06/24 0329     BCID, PCR Enterobacterales spp. Identification by BCID2 PCR.     BOTTLE TYPE Aerobic Bottle    Narrative:      No resistance genes detected.                      Results for orders placed during the hospital encounter of 05/26/23    Adult Transthoracic Echo Complete w/ Color, Spectral and Contrast if necessary per protocol    Interpretation Summary    Left ventricular ejection fraction appears to be 46 - 50%.    Left ventricular wall thickness is consistent with mild concentric hypertrophy.    Left ventricular diastolic function was indeterminate.    There is a TAVR valve present.    Moderate to severe tricuspid valve regurgitation is present.    Estimated right ventricular systolic pressure from tricuspid regurgitation is mildly elevated (35-45 mmHg).    There were no apparent intracardiac masses, vegetations or thrombi.      Labs Pending at Discharge:  Pending Labs       Order Current Status    VBG with Co-Ox and Electrolytes Collected (02/05/24 0119)    Blood Culture - Blood, Arm, Left Preliminary result    Blood Culture - Blood, Arm, Right Preliminary result    Blood Culture - Blood, Arm, Right Preliminary result    Blood Culture - Blood, Arm, Right Preliminary result            Time spent on Discharge including face to face service:  35 minutes    Electronically signed by Virgil Navarro MD, 02/09/24, 2:44 PM EST.

## 2024-02-10 NOTE — OUTREACH NOTE
Prep Survey      Flowsheet Row Responses   [facility] patient discharged from? Go   Is LACE score < 7 ? No   Eligibility [facility] Go   Date of Admission 02/05/24   Date of Discharge 02/09/24   Discharge Disposition Home-Health Care [facility]   Discharge diagnosis Septic shock   Does the patient have one of the following disease processes/diagnoses(primary or secondary)? Sepsis   Does the patient have Home health ordered? Yes   What is the Home health agency?  [facility]   Is there a DME ordered? Yes   What DME was ordered? [facility] - I V ABX, o2 order placed and sent to [facility]   Medication alerts for this patient [facility] - IV ABX   Prep survey completed? Yes            Nehal ROBERTSON - Registered Nurse

## 2024-02-11 LAB
BACTERIA SPEC AEROBE CULT: ABNORMAL
BACTERIA SPEC AEROBE CULT: ABNORMAL
BACTERIA SPEC AEROBE CULT: NORMAL
BACTERIA SPEC AEROBE CULT: NORMAL
GRAM STN SPEC: ABNORMAL
ISOLATED FROM: ABNORMAL
ISOLATED FROM: ABNORMAL

## 2024-02-12 ENCOUNTER — TRANSITIONAL CARE MANAGEMENT TELEPHONE ENCOUNTER (OUTPATIENT)
Dept: CALL CENTER | Facility: HOSPITAL | Age: 59
End: 2024-02-12
Payer: COMMERCIAL

## 2024-02-12 DIAGNOSIS — E11.65 TYPE 2 DIABETES MELLITUS WITH HYPERGLYCEMIA, WITH LONG-TERM CURRENT USE OF INSULIN: Primary | ICD-10-CM

## 2024-02-12 DIAGNOSIS — Z79.4 TYPE 2 DIABETES MELLITUS WITH HYPERGLYCEMIA, WITH LONG-TERM CURRENT USE OF INSULIN: Primary | ICD-10-CM

## 2024-02-19 ENCOUNTER — OFFICE VISIT (OUTPATIENT)
Dept: FAMILY MEDICINE CLINIC | Facility: CLINIC | Age: 59
End: 2024-02-19
Payer: COMMERCIAL

## 2024-02-19 ENCOUNTER — TELEPHONE (OUTPATIENT)
Dept: FAMILY MEDICINE CLINIC | Facility: CLINIC | Age: 59
End: 2024-02-19

## 2024-02-19 ENCOUNTER — READMISSION MANAGEMENT (OUTPATIENT)
Dept: CALL CENTER | Facility: HOSPITAL | Age: 59
End: 2024-02-19
Payer: COMMERCIAL

## 2024-02-19 VITALS
SYSTOLIC BLOOD PRESSURE: 90 MMHG | HEART RATE: 93 BPM | DIASTOLIC BLOOD PRESSURE: 60 MMHG | TEMPERATURE: 97.8 F | BODY MASS INDEX: 22.73 KG/M2 | OXYGEN SATURATION: 95 % | HEIGHT: 64 IN | WEIGHT: 133.16 LBS

## 2024-02-19 DIAGNOSIS — J18.9 COMMUNITY ACQUIRED PNEUMONIA, UNSPECIFIED LATERALITY: ICD-10-CM

## 2024-02-19 DIAGNOSIS — R30.0 DYSURIA: ICD-10-CM

## 2024-02-19 DIAGNOSIS — Z09 HOSPITAL DISCHARGE FOLLOW-UP: Primary | ICD-10-CM

## 2024-02-19 DIAGNOSIS — R19.7 DIARRHEA, UNSPECIFIED TYPE: ICD-10-CM

## 2024-02-19 DIAGNOSIS — E11.9 TYPE 2 DIABETES MELLITUS WITHOUT COMPLICATION, WITHOUT LONG-TERM CURRENT USE OF INSULIN: ICD-10-CM

## 2024-02-19 DIAGNOSIS — D63.8 ANEMIA OF CHRONIC DISEASE: ICD-10-CM

## 2024-02-19 DIAGNOSIS — M32.9 SYSTEMIC LUPUS ERYTHEMATOSUS, UNSPECIFIED SLE TYPE, UNSPECIFIED ORGAN INVOLVEMENT STATUS: ICD-10-CM

## 2024-02-19 DIAGNOSIS — F41.1 GAD (GENERALIZED ANXIETY DISORDER): ICD-10-CM

## 2024-02-19 DIAGNOSIS — F33.9 EPISODE OF RECURRENT MAJOR DEPRESSIVE DISORDER, UNSPECIFIED DEPRESSION EPISODE SEVERITY: ICD-10-CM

## 2024-02-19 PROCEDURE — 82043 UR ALBUMIN QUANTITATIVE: CPT | Performed by: STUDENT IN AN ORGANIZED HEALTH CARE EDUCATION/TRAINING PROGRAM

## 2024-02-19 PROCEDURE — 99214 OFFICE O/P EST MOD 30 MIN: CPT | Performed by: STUDENT IN AN ORGANIZED HEALTH CARE EDUCATION/TRAINING PROGRAM

## 2024-02-19 RX ORDER — PREDNISONE 10 MG/1
10 TABLET ORAL DAILY
Qty: 30 TABLET | Refills: 3 | Status: SHIPPED | OUTPATIENT
Start: 2024-02-19

## 2024-02-19 RX ORDER — LOPERAMIDE HYDROCHLORIDE 2 MG/1
2 TABLET ORAL 3 TIMES DAILY PRN
Qty: 21 TABLET | Refills: 0 | Status: SHIPPED | OUTPATIENT
Start: 2024-02-19 | End: 2024-02-26

## 2024-02-19 NOTE — ASSESSMENT & PLAN NOTE
States 5 mg of prednisone did not help, she states she was previously on 10 mg from her specialist but she is unable to get to the specialist appointments.  Plan to start 10 mg daily I counseled the patient heavily on steroid discontinuation syndromes and possible steroid withdrawal syndrome.  Potential shock, death.

## 2024-02-19 NOTE — TELEPHONE ENCOUNTER
Caller:  HANNAH     Relationship:  formerly [facility]     Best call back number:   [phone]     Who are you requesting to speak with (clinical staff, provider,  specific staff member):   CLINICAL      What was the call regarding:         [facility] IS REQUESTING A VERBAL TO RELEASE THIS PATIENT FROM SPEECH THERAPY FOR NON-COMPLIANCE.. STATED THAT PATIENT HAS BEEN EDUCATED MULTIPLE TIMES AND WILL NOT COMPLY.  PLEASE CALL AND ADVISE.

## 2024-02-19 NOTE — PROGRESS NOTES
"Chief Complaint  Hospital Follow Up Visit    Subjective      History of Present Illness  Isha Llanes is a 58 y.o. female who presents to [facility] for care of hospital follow-up, she was admitted from 2/4 to 2/9, for CAP Pseudomonas bacteremia, septic shock, COPD exacerbation, she was discharged on new medications, Protonix, cefepime IV complete 14 days, Trelegy, nicotine patch, midodrine.   She completed IV cefepime 2 days ago.  States she continues to feel bad. She sees Dr. Mancuso for chemo, has not seen him in 4 months.  Pt has ahistory of lupus, and states she wants me to increase her prednisone to 10 mg daily, she states that is what her specialist had given her for years, but she can not get to him in [place] anymore, she had a old bottle today with that dose and her name on it.     Objective   Vital Signs:   Vitals:    02/19/24 0906   BP: 90/60   Pulse: 93   Temp: 97.8 °F (36.6 °C)   SpO2: 95%   Weight: 60.4 kg (133 lb 2.5 oz)   Height: 162.6 cm (64\")     Body mass index is 22.86 kg/m².    Wt Readings from Last 3 Encounters:   02/19/24 60.4 kg (133 lb 2.5 oz)   02/05/24 60.4 kg (133 lb 2.5 oz)   01/09/24 59 kg (130 lb)     BP Readings from Last 3 Encounters:   02/19/24 90/60   02/09/24 109/48   01/09/24 100/70       Health Maintenance   Topic Date Due    ANNUAL PHYSICAL  Never done    DIABETIC FOOT EXAM  Never done    PAP SMEAR  03/29/2017    DIABETIC EYE EXAM  Never done    LIPID PANEL  01/12/2024    URINE MICROALBUMIN  01/12/2024    ZOSTER VACCINE (1 of 2) 02/19/2024 (Originally 12/4/1984)    INFLUENZA VACCINE  03/31/2024 (Originally 8/1/2023)    COVID-19 Vaccine (1) 05/10/2024 (Originally 12/4/1970)    Hepatitis B (1 of 3 - 3-dose series) 02/19/2025 (Originally 1965)    Pneumococcal Vaccine 0-64 (1 of 2 - PCV) 02/19/2025 (Originally 12/4/1971)    MAMMOGRAM  02/19/2025 (Originally 1/8/2021)    TDAP/TD VACCINES (1 - Tdap) 02/19/2025 (Originally 12/4/1984) "    HEMOGLOBIN A1C  05/29/2024    LUNG CANCER SCREENING  09/05/2024    COLORECTAL CANCER SCREENING  04/13/2032    HEPATITIS C SCREENING  Completed       Physical Exam     Result Review :  The following data was reviewed by: Katia Wright MD on 02/19/2024:  Data reviewed : Radiologic studies CXR, Consultant notes seen, and Recent hospitalization notes Dr. Navarro note, discharge note        Procedures     Power Wheelchair: Patient is unable to safely use a cane, walker or manual wheelchair. Due to immobility related to diagnosis, a power wheelchair is needed to assist with daily living activities inside the home. Patient has the physical ability and mental capabilities to control the power wheelchair inside the home.      Assessment & Plan  Hospital discharge follow-up    Community acquired pneumonia, unspecified laterality    Episode of recurrent major depressive disorder, unspecified depression episode severity  Patient's depression is a recurrent episode that is mild without psychosis. Depression is active and stable.    Plan:   Continue current medication therapy     Followup in 6 months.   Anemia of chronic disease  Patient is seeing hematology oncology has an appointment with him next month.  JEFFREY (generalized anxiety disorder)  Psychological condition is unchanged.  Continue current treatment regimen.  Psychological condition  will be reassessed in 6 months.  Systemic lupus erythematosus, unspecified SLE type, unspecified organ involvement status  States 5 mg of prednisone did not help, she states she was previously on 10 mg from her specialist but she is unable to get to the specialist appointments.  Plan to start 10 mg daily I counseled the patient heavily on steroid discontinuation syndromes and possible steroid withdrawal syndrome.  Potential shock, death.  Diarrhea, unspecified type  Patient requesting loperamide as she continues to have diarrhea for the past 2 days she states her household all is  experiencing a viral diarrheal illness.  Dysuria    Type 2 diabetes mellitus without complication, without long-term current use of insulin  Diabetes is stable.   Continue current treatment regimen.  Diabetes will be reassessed in 3 months    Orders Placed This Encounter   Procedures    MicroAlbumin, Urine, Random - Urine, Clean Catch     New Medications Ordered This Visit   Medications    predniSONE (DELTASONE) 10 MG tablet     Sig: Take 1 tablet by mouth Daily.     Dispense:  30 tablet     Refill:  3    loperamide (Imodium A-D) 2 MG tablet     Sig: Take 1 tablet by mouth 3 (Three) Times a Day As Needed for Diarrhea for up to 7 days.     Dispense:  21 tablet     Refill:  0          BMI is within normal parameters. No other follow-up for BMI required.         FOLLOW UP  No follow-ups on file.  Patient was given instructions and counseling regarding her condition or for health maintenance advice. Please see specific information pulled into the AVS if appropriate.       Katia Wright MD  02/19/24  10:35 EST    CURRENT & DISCONTINUED MEDICATIONS  Current Outpatient Medications   Medication Instructions    albuterol (PROVENTIL) 2.5 mg, Nebulization, Every 4 Hours PRN    albuterol sulfate  (90 Base) MCG/ACT inhaler 2 puffs, Inhalation, Every 4 Hours PRN    ALPRAZolam (XANAX) 0.5 mg, Oral, 2 Times Daily PRN    bacitracin 0.9 g, Topical, 2 Times Daily    cetirizine (zyrTEC) 10 MG tablet 1 tablet, Oral, Daily    clopidogrel (PLAVIX) 75 MG tablet 1 tablet, Oral, Daily    Fluticasone-Umeclidin-Vilant (Trelegy Ellipta) 200-62.5-25 MCG/ACT aerosol powder  1 puff, Inhalation, Daily    furosemide (LASIX) 40 mg, Oral, Daily    glucose blood test strip Use as instructed    HYDROcodone-acetaminophen (NORCO) 5-325 MG per tablet 1 tablet, Oral, Daily PRN    hydroxychloroquine (PLAQUENIL) 200 mg, Oral, Daily    Lancets (OneTouch Delica Plus [id number]) misc     loperamide (IMODIUM A-D) 2 mg, Oral, 3 Times Daily PRN    midodrine  (PROAMATINE) 10 mg, Oral, 3 Times Daily Before Meals    mupirocin (BACTROBAN) 2 % ointment 1 application , Topical, 3 Times Daily    nicotine (NICODERM CQ) 21 MG/24HR patch 1 patch, Transdermal, Every 24 Hours Scheduled    nicotine polacrilex (COMMIT) 2 mg, Mouth/Throat, As Needed    O2 (OXYGEN) Inhalation, Once    ondansetron (ZOFRAN) 4 mg, Oral, Every 12 Hours PRN    pain patient supplied pump Intrathecal, Continuous, <BR>Type of Medication: Hydromorphone 15 mg/ml  and Bupivacaine 0.5 mg/ml<BR>Provider:Dr. Boudreaux<BR>Provider number: [phone]<BR>Basal Dose: Hydromorphone 7.518 mg/day Bupivacaine 0.00565 mg/day<BR>( MAX of Hydromorphone 9.940 mg/ day; Bupivacaine 0.17546 mg /day)<BR>Bolus Dose:Hydromorphone 0.500 mg, Bupivacaine 0.09415 mg<BR>Lockout 3 hours. 1 Bolus per every 3 hours max of 5 bolus per day<BR>Next refill/ Alarm Date- 03/25/2024<BR>Pump manufacture:[employer]<BR><BR><BR>     pantoprazole (PROTONIX) 40 mg, Oral, Every Early Morning    predniSONE (DELTASONE) 10 mg, Oral, Daily       Medications Discontinued During This Encounter   Medication Reason    gabapentin (NEURONTIN) 300 MG capsule

## 2024-02-19 NOTE — ASSESSMENT & PLAN NOTE
Patient's depression is a recurrent episode that is mild without psychosis. Depression is active and stable.    Plan:   Continue current medication therapy     Followup in 6 months.

## 2024-02-19 NOTE — ASSESSMENT & PLAN NOTE
Diabetes is stable.   Continue current treatment regimen.  Diabetes will be reassessed in 3 months

## 2024-02-20 LAB — ALBUMIN UR-MCNC: 6.6 MG/DL

## 2024-02-20 NOTE — OUTREACH NOTE
Sepsis Week 2 Survey      Flowsheet Row Responses   [facility] facility patient discharged from? [facility]   Does the patient have one of the following disease processes/diagnoses(primary or secondary)? Sepsis   Week 2 attempt successful? No   Unsuccessful attempts Attempt 1            Erlinda REYES - Licensed Nurse

## 2024-02-21 DIAGNOSIS — I50.9 CONGESTIVE HEART FAILURE, UNSPECIFIED HF CHRONICITY, UNSPECIFIED HEART FAILURE TYPE: Primary | ICD-10-CM

## 2024-02-21 RX ORDER — CLOPIDOGREL BISULFATE 75 MG/1
75 TABLET ORAL DAILY
Qty: 90 TABLET | Refills: 2 | Status: SHIPPED | OUTPATIENT
Start: 2024-02-21

## 2024-02-22 ENCOUNTER — READMISSION MANAGEMENT (OUTPATIENT)
Dept: CALL CENTER | Facility: HOSPITAL | Age: 59
End: 2024-02-22
Payer: COMMERCIAL

## 2024-02-22 NOTE — OUTREACH NOTE
Sepsis Week 2 Survey      Flowsheet Row Responses   [facility] facility patient discharged from? Go   Does the patient have one of the following disease processes/diagnoses(primary or secondary)? Sepsis   Week 2 attempt successful? No   Unsuccessful attempts Attempt 2            Kelly LIPSCOMB - Registered Nurse

## 2024-02-29 ENCOUNTER — TELEPHONE (OUTPATIENT)
Dept: FAMILY MEDICINE CLINIC | Facility: CLINIC | Age: 59
End: 2024-02-29
Payer: COMMERCIAL

## 2024-03-01 ENCOUNTER — READMISSION MANAGEMENT (OUTPATIENT)
Dept: CALL CENTER | Facility: HOSPITAL | Age: 59
End: 2024-03-01
Payer: COMMERCIAL

## 2024-03-01 NOTE — OUTREACH NOTE
Sepsis Week 3 Survey      Flowsheet Row Responses   [facility] facility patient discharged from? Go   Does the patient have one of the following disease processes/diagnoses(primary or secondary)? Sepsis   Week 3 attempt successful? No   Unsuccessful attempts Attempt 1   Lee RAJAN - Registered Nurse

## 2024-03-05 ENCOUNTER — TELEMEDICINE (OUTPATIENT)
Dept: FAMILY MEDICINE CLINIC | Facility: CLINIC | Age: 59
End: 2024-03-05
Payer: COMMERCIAL

## 2024-03-05 VITALS — HEIGHT: 64 IN | BODY MASS INDEX: 22.71 KG/M2 | WEIGHT: 133 LBS

## 2024-03-05 DIAGNOSIS — R10.13 EPIGASTRIC PAIN: ICD-10-CM

## 2024-03-05 DIAGNOSIS — J01.00 ACUTE MAXILLARY SINUSITIS, RECURRENCE NOT SPECIFIED: Primary | ICD-10-CM

## 2024-03-05 PROCEDURE — 1159F MED LIST DOCD IN RCRD: CPT | Performed by: STUDENT IN AN ORGANIZED HEALTH CARE EDUCATION/TRAINING PROGRAM

## 2024-03-05 PROCEDURE — 1160F RVW MEDS BY RX/DR IN RCRD: CPT | Performed by: STUDENT IN AN ORGANIZED HEALTH CARE EDUCATION/TRAINING PROGRAM

## 2024-03-05 PROCEDURE — 99213 OFFICE O/P EST LOW 20 MIN: CPT | Performed by: STUDENT IN AN ORGANIZED HEALTH CARE EDUCATION/TRAINING PROGRAM

## 2024-03-05 RX ORDER — AZITHROMYCIN 250 MG/1
TABLET, FILM COATED ORAL
Qty: 6 TABLET | Refills: 0 | Status: SHIPPED | OUTPATIENT
Start: 2024-03-05 | End: 2024-03-08 | Stop reason: HOSPADM

## 2024-03-05 NOTE — PROGRESS NOTES
"Chief Complaint  Follow-up (Chair and supplies )    Subjective      History of Present Illness    Isha Llanes is a 58 y.o. 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   Objective   Vital Signs:   Vitals:    03/05/24 1112   Weight: 60.3 kg (133 lb)   Height: 162.6 cm (64\")     Body mass index is 22.83 kg/m².    Wt Readings from Last 3 Encounters:   03/05/24 60.3 kg (133 lb)   02/19/24 60.4 kg (133 lb 2.5 oz)   02/05/24 60.4 kg (133 lb 2.5 oz)     BP Readings from Last 3 Encounters:   02/19/24 90/60   02/09/24 109/48   01/09/24 100/70       Health Maintenance   Topic Date Due    ZOSTER VACCINE (1 of 2) Never done    ANNUAL PHYSICAL  Never done    DIABETIC FOOT EXAM  Never done    PAP SMEAR  03/29/2017    DIABETIC EYE EXAM  Never done    LIPID PANEL  01/12/2024    INFLUENZA VACCINE  03/31/2024 (Originally 8/1/2023)    COVID-19 Vaccine (1) 05/10/2024 (Originally 12/4/1970)    Hepatitis B (1 of 3 - 19+ 3-dose series) 02/19/2025 (Originally 12/4/1984)    Pneumococcal Vaccine 0-64 (1 of 2 - PCV) 02/19/2025 (Originally 12/4/1971)    MAMMOGRAM  02/19/2025 (Originally 1/8/2021)    TDAP/TD VACCINES (1 - Tdap) 02/19/2025 (Originally 12/4/1984)    HEMOGLOBIN A1C  05/29/2024    LUNG CANCER SCREENING  09/05/2024    URINE MICROALBUMIN  02/19/2025    COLORECTAL CANCER SCREENING  04/13/2032    HEPATITIS C SCREENING  Completed       Physical Exam     Result Review :     The following data was reviewed by: Katia Wright MD on 03/05/2024:      Procedures          Diagnoses and all orders for this visit:    1. Acute maxillary sinusitis, recurrence not specified (Primary)  -    "  azithromycin (Zithromax Z-Tom) 250 MG tablet; Take 2 tablets by mouth on day 1, then 1 tablet daily on days 2-5  Dispense: 6 tablet; Refill: 0    2. Epigastric pain  -     CT Abdomen Pelvis Without Contrast; Future       She cannot take doxycyline.   BMI is within normal parameters. No other follow-up for BMI required.         FOLLOW UP  Return in about 4 weeks (around 4/2/2024).  Patient was given instructions and counseling regarding her condition or for health maintenance advice. Please see specific information pulled into the AVS if appropriate.       Katia Wright MD  03/05/24  15:38 EST    CURRENT & DISCONTINUED MEDICATIONS  Current Outpatient Medications   Medication Instructions    albuterol (PROVENTIL) 2.5 mg, Nebulization, Every 4 Hours PRN    albuterol sulfate  (90 Base) MCG/ACT inhaler 2 puffs, Inhalation, Every 4 Hours PRN    ALPRAZolam (XANAX) 0.5 mg, Oral, 2 Times Daily PRN    azithromycin (Zithromax Z-Tom) 250 MG tablet Take 2 tablets by mouth on day 1, then 1 tablet daily on days 2-5    bacitracin 0.9 g, Topical, 2 Times Daily    cetirizine (zyrTEC) 10 MG tablet 1 tablet, Oral, Daily    clopidogrel (PLAVIX) 75 mg, Oral, Daily    Fluticasone-Umeclidin-Vilant (Trelegy Ellipta) 200-62.5-25 MCG/ACT aerosol powder  1 puff, Inhalation, Daily    furosemide (LASIX) 40 mg, Oral, Daily    glucose blood test strip Use as instructed    HYDROcodone-acetaminophen (NORCO) 5-325 MG per tablet 1 tablet, Oral, Daily PRN    hydroxychloroquine (PLAQUENIL) 200 mg, Oral, Daily    Lancets (OneTouch Delica Plus [id number]) misc     midodrine (PROAMATINE) 10 mg, Oral, 3 Times Daily Before Meals    mupirocin (BACTROBAN) 2 % ointment 1 application , Topical, 3 Times Daily    nicotine polacrilex (COMMIT) 2 mg, Mouth/Throat, As Needed    O2 (OXYGEN) Inhalation, Once    ondansetron (ZOFRAN) 4 mg, Oral, Every 12 Hours PRN    pain patient supplied pump Intrathecal, Continuous, <BR>Type of Medication: Hydromorphone 15 mg/ml   and Bupivacaine 0.5 mg/ml<BR>Provider:Dr. Boudreaux<BR>Provider number: [phone]<BR>Basal Dose: Hydromorphone 7.518 mg/day Bupivacaine 0.68835 mg/day<BR>( MAX of Hydromorphone 9.940 mg/ day; Bupivacaine 0.44833 mg /day)<BR>Bolus Dose:Hydromorphone 0.500 mg, Bupivacaine 0.80510 mg<BR>Lockout 3 hours. 1 Bolus per every 3 hours max of 5 bolus per day<BR>Next refill/ Alarm Date- 03/25/2024<BR>Pump manufacture:[employer]<BR><BR><BR>     pantoprazole (PROTONIX) 40 mg, Oral, Every Early Morning    predniSONE (DELTASONE) 10 mg, Oral, Daily       There are no discontinued medications.

## 2024-03-07 ENCOUNTER — HOSPITAL ENCOUNTER (OUTPATIENT)
Facility: HOSPITAL | Age: 59
Setting detail: OBSERVATION
Discharge: HOME | End: 2024-03-08
Attending: EMERGENCY MEDICINE | Admitting: STUDENT IN AN ORGANIZED HEALTH CARE EDUCATION/TRAINING PROGRAM
Payer: COMMERCIAL

## 2024-03-07 ENCOUNTER — APPOINTMENT (OUTPATIENT)
Dept: CT IMAGING | Facility: HOSPITAL | Age: 59
End: 2024-03-07
Payer: COMMERCIAL

## 2024-03-07 DIAGNOSIS — J01.00 ACUTE MAXILLARY SINUSITIS, RECURRENCE NOT SPECIFIED: ICD-10-CM

## 2024-03-07 DIAGNOSIS — G89.3 CANCER ASSOCIATED PAIN: ICD-10-CM

## 2024-03-07 DIAGNOSIS — R19.7 DIARRHEA, UNSPECIFIED TYPE: Primary | ICD-10-CM

## 2024-03-07 PROBLEM — R11.2 NAUSEA VOMITING AND DIARRHEA: Status: ACTIVE | Noted: 2024-03-07

## 2024-03-07 LAB
ALBUMIN SERPL-MCNC: 4.5 G/DL (ref 3.5–5.2)
ALBUMIN/GLOB SERPL: 1.2 G/DL
ALP SERPL-CCNC: 78 U/L (ref 39–117)
ALT SERPL W P-5'-P-CCNC: 12 U/L (ref 1–33)
ANION GAP SERPL CALCULATED.3IONS-SCNC: 16.6 MMOL/L (ref 5–15)
ARTERIAL PATENCY WRIST A: ABNORMAL
AST SERPL-CCNC: 22 U/L (ref 1–32)
BACTERIA UR QL AUTO: ABNORMAL /HPF
BASE EXCESS BLDA CALC-SCNC: 1.7 MMOL/L (ref -2–2)
BASOPHILS # BLD AUTO: 0.01 10*3/MM3 (ref 0–0.2)
BASOPHILS NFR BLD AUTO: 0.1 % (ref 0–1.5)
BDY SITE: ABNORMAL
BILIRUB SERPL-MCNC: 0.4 MG/DL (ref 0–1.2)
BILIRUB UR QL STRIP: NEGATIVE
BUN SERPL-MCNC: 21 MG/DL (ref 6–20)
BUN/CREAT SERPL: 38.2 (ref 7–25)
CA-I BLDA-SCNC: 1.13 MMOL/L (ref 1.13–1.32)
CALCIUM SPEC-SCNC: 9.6 MG/DL (ref 8.6–10.5)
CHLORIDE BLDA-SCNC: 102 MMOL/L (ref 98–106)
CHLORIDE SERPL-SCNC: 100 MMOL/L (ref 98–107)
CLARITY UR: CLEAR
CO2 SERPL-SCNC: 24.4 MMOL/L (ref 22–29)
COHGB MFR BLD: 2.2 % (ref 0–1.5)
COLOR UR: YELLOW
CREAT SERPL-MCNC: 0.55 MG/DL (ref 0.57–1)
D-LACTATE SERPL-SCNC: 0.7 MMOL/L (ref 0.5–2)
D-LACTATE SERPL-SCNC: 2.1 MMOL/L (ref 0.5–2)
DEPRECATED RDW RBC AUTO: 50 FL (ref 37–54)
EGFRCR SERPLBLD CKD-EPI 2021: 106.4 ML/MIN/1.73
EOSINOPHIL # BLD AUTO: 0.1 10*3/MM3 (ref 0–0.4)
EOSINOPHIL NFR BLD AUTO: 1.4 % (ref 0.3–6.2)
ERYTHROCYTE [DISTWIDTH] IN BLOOD BY AUTOMATED COUNT: 14.9 % (ref 12.3–15.4)
FHHB: 2.3 % (ref 0–5)
FLUAV SUBTYP SPEC NAA+PROBE: NOT DETECTED
FLUBV RNA ISLT QL NAA+PROBE: NOT DETECTED
GAS FLOW AIRWAY: 3 LPM
GLOBULIN UR ELPH-MCNC: 3.7 GM/DL
GLUCOSE BLDA-MCNC: 133 MG/DL (ref 65–99)
GLUCOSE BLDC GLUCOMTR-MCNC: 101 MG/DL (ref 70–99)
GLUCOSE SERPL-MCNC: 131 MG/DL (ref 65–99)
GLUCOSE UR STRIP-MCNC: NEGATIVE MG/DL
HCO3 BLDA-SCNC: 27.1 MMOL/L (ref 22–26)
HCT VFR BLD AUTO: 51.2 % (ref 34–46.6)
HGB BLD-MCNC: 15.7 G/DL (ref 12–15.9)
HGB BLDA-MCNC: 13.3 G/DL (ref 11.7–14.6)
HGB UR QL STRIP.AUTO: NEGATIVE
HOLD SPECIMEN: NORMAL
HOLD SPECIMEN: NORMAL
HYALINE CASTS UR QL AUTO: ABNORMAL /LPF
IMM GRANULOCYTES # BLD AUTO: 0.03 10*3/MM3 (ref 0–0.05)
IMM GRANULOCYTES NFR BLD AUTO: 0.4 % (ref 0–0.5)
INHALED O2 CONCENTRATION: 32 %
KETONES UR QL STRIP: NEGATIVE
LACTATE BLDA-SCNC: 0.84 MMOL/L (ref 0.5–2)
LEUKOCYTE ESTERASE UR QL STRIP.AUTO: ABNORMAL
LIPASE SERPL-CCNC: 39 U/L (ref 13–60)
LYMPHOCYTES # BLD AUTO: 0.35 10*3/MM3 (ref 0.7–3.1)
LYMPHOCYTES NFR BLD AUTO: 4.9 % (ref 19.6–45.3)
MCH RBC QN AUTO: 28 PG (ref 26.6–33)
MCHC RBC AUTO-ENTMCNC: 30.7 G/DL (ref 31.5–35.7)
MCV RBC AUTO: 91.4 FL (ref 79–97)
METHGB BLD QL: 0.5 % (ref 0–1.5)
MODALITY: ABNORMAL
MONOCYTES # BLD AUTO: 0.3 10*3/MM3 (ref 0.1–0.9)
MONOCYTES NFR BLD AUTO: 4.2 % (ref 5–12)
NEUTROPHILS NFR BLD AUTO: 6.3 10*3/MM3 (ref 1.7–7)
NEUTROPHILS NFR BLD AUTO: 89 % (ref 42.7–76)
NITRITE UR QL STRIP: NEGATIVE
NOTE: ABNORMAL
NRBC BLD AUTO-RTO: 0 /100 WBC (ref 0–0.2)
OXYHGB MFR BLDV: 95 % (ref 94–99)
PCO2 BLDA: 45.2 MM HG (ref 35–45)
PH BLDA: 7.39 PH UNITS (ref 7.35–7.45)
PH UR STRIP.AUTO: <=5 [PH] (ref 5–8)
PLATELET # BLD AUTO: 159 10*3/MM3 (ref 140–450)
PMV BLD AUTO: 10.5 FL (ref 6–12)
PO2 BLD: 335 MM[HG] (ref 0–500)
PO2 BLDA: 107.2 MM HG (ref 80–100)
POTASSIUM BLDA-SCNC: 3.75 MMOL/L (ref 3.5–5)
POTASSIUM SERPL-SCNC: 4.3 MMOL/L (ref 3.5–5.2)
PROT SERPL-MCNC: 8.2 G/DL (ref 6–8.5)
PROT UR QL STRIP: ABNORMAL
QT INTERVAL: 354 MS
QTC INTERVAL: 463 MS
RBC # BLD AUTO: 5.6 10*6/MM3 (ref 3.77–5.28)
RBC # UR STRIP: ABNORMAL /HPF
REF LAB TEST METHOD: ABNORMAL
RSV RNA NPH QL NAA+NON-PROBE: NOT DETECTED
SAO2 % BLDCOA: 97.6 % (ref 95–99)
SARS-COV-2 RNA RESP QL NAA+PROBE: NOT DETECTED
SODIUM BLDA-SCNC: 135.3 MMOL/L (ref 136–146)
SODIUM SERPL-SCNC: 141 MMOL/L (ref 136–145)
SP GR UR STRIP: 1.03 (ref 1–1.03)
SQUAMOUS #/AREA URNS HPF: ABNORMAL /HPF
UROBILINOGEN UR QL STRIP: ABNORMAL
WBC # UR STRIP: ABNORMAL /HPF
WBC NRBC COR # BLD AUTO: 7.09 10*3/MM3 (ref 3.4–10.8)
WHOLE BLOOD HOLD COAG: NORMAL
WHOLE BLOOD HOLD SPECIMEN: NORMAL

## 2024-03-07 PROCEDURE — 25810000003 SODIUM CHLORIDE 0.9 % SOLUTION: Performed by: EMERGENCY MEDICINE

## 2024-03-07 PROCEDURE — 93005 ELECTROCARDIOGRAM TRACING: CPT

## 2024-03-07 PROCEDURE — G0378 HOSPITAL OBSERVATION PER HR: HCPCS

## 2024-03-07 PROCEDURE — 87637 SARSCOV2&INF A&B&RSV AMP PRB: CPT | Performed by: EMERGENCY MEDICINE

## 2024-03-07 PROCEDURE — 85025 COMPLETE CBC W/AUTO DIFF WBC: CPT

## 2024-03-07 PROCEDURE — 82375 ASSAY CARBOXYHB QUANT: CPT | Performed by: STUDENT IN AN ORGANIZED HEALTH CARE EDUCATION/TRAINING PROGRAM

## 2024-03-07 PROCEDURE — 83050 HGB METHEMOGLOBIN QUAN: CPT | Performed by: STUDENT IN AN ORGANIZED HEALTH CARE EDUCATION/TRAINING PROGRAM

## 2024-03-07 PROCEDURE — 82805 BLOOD GASES W/O2 SATURATION: CPT | Performed by: STUDENT IN AN ORGANIZED HEALTH CARE EDUCATION/TRAINING PROGRAM

## 2024-03-07 PROCEDURE — 81001 URINALYSIS AUTO W/SCOPE: CPT | Performed by: EMERGENCY MEDICINE

## 2024-03-07 PROCEDURE — 83690 ASSAY OF LIPASE: CPT

## 2024-03-07 PROCEDURE — 25010000002 HEPARIN (PORCINE) PER 1000 UNITS: Performed by: STUDENT IN AN ORGANIZED HEALTH CARE EDUCATION/TRAINING PROGRAM

## 2024-03-07 PROCEDURE — 82948 REAGENT STRIP/BLOOD GLUCOSE: CPT

## 2024-03-07 PROCEDURE — 82948 REAGENT STRIP/BLOOD GLUCOSE: CPT | Performed by: STUDENT IN AN ORGANIZED HEALTH CARE EDUCATION/TRAINING PROGRAM

## 2024-03-07 PROCEDURE — 94799 UNLISTED PULMONARY SVC/PX: CPT

## 2024-03-07 PROCEDURE — 83605 ASSAY OF LACTIC ACID: CPT | Performed by: EMERGENCY MEDICINE

## 2024-03-07 PROCEDURE — 96372 THER/PROPH/DIAG INJ SC/IM: CPT

## 2024-03-07 PROCEDURE — 99223 1ST HOSP IP/OBS HIGH 75: CPT | Performed by: STUDENT IN AN ORGANIZED HEALTH CARE EDUCATION/TRAINING PROGRAM

## 2024-03-07 PROCEDURE — 74176 CT ABD & PELVIS W/O CONTRAST: CPT

## 2024-03-07 PROCEDURE — 36415 COLL VENOUS BLD VENIPUNCTURE: CPT | Performed by: EMERGENCY MEDICINE

## 2024-03-07 PROCEDURE — 93005 ELECTROCARDIOGRAM TRACING: CPT | Performed by: EMERGENCY MEDICINE

## 2024-03-07 PROCEDURE — 80053 COMPREHEN METABOLIC PANEL: CPT

## 2024-03-07 PROCEDURE — 99285 EMERGENCY DEPT VISIT HI MDM: CPT

## 2024-03-07 PROCEDURE — 83605 ASSAY OF LACTIC ACID: CPT

## 2024-03-07 PROCEDURE — 96360 HYDRATION IV INFUSION INIT: CPT

## 2024-03-07 PROCEDURE — 93010 ELECTROCARDIOGRAM REPORT: CPT | Performed by: INTERNAL MEDICINE

## 2024-03-07 PROCEDURE — 36600 WITHDRAWAL OF ARTERIAL BLOOD: CPT | Performed by: STUDENT IN AN ORGANIZED HEALTH CARE EDUCATION/TRAINING PROGRAM

## 2024-03-07 PROCEDURE — 94640 AIRWAY INHALATION TREATMENT: CPT

## 2024-03-07 RX ORDER — ALPRAZOLAM 0.25 MG/1
0.5 TABLET ORAL ONCE
Status: COMPLETED | OUTPATIENT
Start: 2024-03-07 | End: 2024-03-07

## 2024-03-07 RX ORDER — PREDNISONE 10 MG/1
10 TABLET ORAL DAILY
Status: DISCONTINUED | OUTPATIENT
Start: 2024-03-08 | End: 2024-03-08 | Stop reason: HOSPADM

## 2024-03-07 RX ORDER — HEPARIN SODIUM 5000 [USP'U]/ML
5000 INJECTION, SOLUTION INTRAVENOUS; SUBCUTANEOUS EVERY 8 HOURS SCHEDULED
Status: DISCONTINUED | OUTPATIENT
Start: 2024-03-07 | End: 2024-03-08 | Stop reason: HOSPADM

## 2024-03-07 RX ORDER — BUDESONIDE 0.5 MG/2ML
0.5 INHALANT ORAL
Status: DISCONTINUED | OUTPATIENT
Start: 2024-03-07 | End: 2024-03-08 | Stop reason: HOSPADM

## 2024-03-07 RX ORDER — MIDODRINE HYDROCHLORIDE 10 MG/1
10 TABLET ORAL
Status: DISCONTINUED | OUTPATIENT
Start: 2024-03-08 | End: 2024-03-08 | Stop reason: HOSPADM

## 2024-03-07 RX ORDER — ALBUTEROL SULFATE 2.5 MG/3ML
2.5 SOLUTION RESPIRATORY (INHALATION) EVERY 6 HOURS PRN
Status: DISCONTINUED | OUTPATIENT
Start: 2024-03-07 | End: 2024-03-08 | Stop reason: HOSPADM

## 2024-03-07 RX ORDER — NITROGLYCERIN 0.4 MG/1
0.4 TABLET SUBLINGUAL
Status: DISCONTINUED | OUTPATIENT
Start: 2024-03-07 | End: 2024-03-08 | Stop reason: HOSPADM

## 2024-03-07 RX ORDER — ALPRAZOLAM 0.25 MG/1
0.25 TABLET ORAL ONCE
Status: DISCONTINUED | OUTPATIENT
Start: 2024-03-07 | End: 2024-03-07

## 2024-03-07 RX ORDER — SODIUM CHLORIDE 0.9 % (FLUSH) 0.9 %
10 SYRINGE (ML) INJECTION AS NEEDED
Status: DISCONTINUED | OUTPATIENT
Start: 2024-03-07 | End: 2024-03-08 | Stop reason: HOSPADM

## 2024-03-07 RX ORDER — IBUPROFEN 600 MG/1
1 TABLET ORAL
Status: DISCONTINUED | OUTPATIENT
Start: 2024-03-07 | End: 2024-03-08 | Stop reason: HOSPADM

## 2024-03-07 RX ORDER — SODIUM CHLORIDE 0.9 % (FLUSH) 0.9 %
10 SYRINGE (ML) INJECTION EVERY 12 HOURS SCHEDULED
Status: DISCONTINUED | OUTPATIENT
Start: 2024-03-07 | End: 2024-03-08 | Stop reason: HOSPADM

## 2024-03-07 RX ORDER — DEXTROSE MONOHYDRATE 25 G/50ML
25 INJECTION, SOLUTION INTRAVENOUS
Status: DISCONTINUED | OUTPATIENT
Start: 2024-03-07 | End: 2024-03-08 | Stop reason: HOSPADM

## 2024-03-07 RX ORDER — HYDROXYCHLOROQUINE SULFATE 200 MG/1
200 TABLET, FILM COATED ORAL DAILY
Status: DISCONTINUED | OUTPATIENT
Start: 2024-03-08 | End: 2024-03-08 | Stop reason: HOSPADM

## 2024-03-07 RX ORDER — SODIUM CHLORIDE 9 MG/ML
40 INJECTION, SOLUTION INTRAVENOUS AS NEEDED
Status: DISCONTINUED | OUTPATIENT
Start: 2024-03-07 | End: 2024-03-08 | Stop reason: HOSPADM

## 2024-03-07 RX ORDER — NICOTINE POLACRILEX 4 MG
15 LOZENGE BUCCAL
Status: DISCONTINUED | OUTPATIENT
Start: 2024-03-07 | End: 2024-03-08 | Stop reason: HOSPADM

## 2024-03-07 RX ORDER — CLOPIDOGREL BISULFATE 75 MG/1
75 TABLET ORAL DAILY
Status: DISCONTINUED | OUTPATIENT
Start: 2024-03-08 | End: 2024-03-08 | Stop reason: HOSPADM

## 2024-03-07 RX ORDER — ARFORMOTEROL TARTRATE 15 UG/2ML
15 SOLUTION RESPIRATORY (INHALATION)
Status: DISCONTINUED | OUTPATIENT
Start: 2024-03-07 | End: 2024-03-08 | Stop reason: HOSPADM

## 2024-03-07 RX ORDER — HYDROCODONE BITARTRATE AND ACETAMINOPHEN 5; 325 MG/1; MG/1
1 TABLET ORAL DAILY PRN
Status: DISCONTINUED | OUTPATIENT
Start: 2024-03-07 | End: 2024-03-08 | Stop reason: HOSPADM

## 2024-03-07 RX ORDER — PANTOPRAZOLE SODIUM 40 MG/1
40 TABLET, DELAYED RELEASE ORAL
Status: DISCONTINUED | OUTPATIENT
Start: 2024-03-08 | End: 2024-03-08 | Stop reason: HOSPADM

## 2024-03-07 RX ORDER — ONDANSETRON 2 MG/ML
4 INJECTION INTRAMUSCULAR; INTRAVENOUS EVERY 6 HOURS PRN
Status: DISCONTINUED | OUTPATIENT
Start: 2024-03-07 | End: 2024-03-08 | Stop reason: HOSPADM

## 2024-03-07 RX ORDER — ALPRAZOLAM 0.25 MG/1
0.5 TABLET ORAL 2 TIMES DAILY PRN
Status: DISCONTINUED | OUTPATIENT
Start: 2024-03-08 | End: 2024-03-08 | Stop reason: HOSPADM

## 2024-03-07 RX ADMIN — ALPRAZOLAM 0.5 MG: 0.25 TABLET ORAL at 20:48

## 2024-03-07 RX ADMIN — SODIUM CHLORIDE 2000 ML: 9 INJECTION, SOLUTION INTRAVENOUS at 15:42

## 2024-03-07 RX ADMIN — ARFORMOTEROL TARTRATE 15 MCG: 15 SOLUTION RESPIRATORY (INHALATION) at 23:22

## 2024-03-07 RX ADMIN — BUDESONIDE 0.5 MG: 0.5 INHALANT RESPIRATORY (INHALATION) at 23:21

## 2024-03-07 RX ADMIN — HEPARIN SODIUM 5000 UNITS: 5000 INJECTION INTRAVENOUS; SUBCUTANEOUS at 22:13

## 2024-03-07 RX ADMIN — Medication 10 ML: at 22:51

## 2024-03-07 NOTE — ED PROVIDER NOTES
Time: 3:33 PM EST  Date of encounter:  3/7/2024  Independent Historian/Clinical History and Information was obtained by:   Patient and Family    History is limited by: N/A    Chief Complaint: Vomiting and diarrhea      History of Present Illness:  Patient is a 58 y.o. year old female who presents to the emergency department for evaluation of nausea, vomiting, and diarrhea this gotten worse over the past several days.  Patient has no fever or chills.  Patient has no chest pain or shortness of breath.  Patient denies abdominal pain.  Patient has no leg pain or swelling.  Patient has had no cough or hemoptysis.    HPI    Patient Care Team  Primary Care Provider: Katia Wright MD    Past Medical History:     Allergies   Allergen Reactions    Amoxicillin Shortness Of Breath     Has tolerated Cefazolin, Ceftriaxone, and Cefepime -Jermaine Melida, [facility]    Ceclor [Cefaclor] Shortness Of Breath     Has tolerated Cefazolin, Ceftriaxone, and Cefepime -Jermaine Melida, [facility]      Penicillins Shortness Of Breath     Has tolerated Cefazolin, Ceftriaxone, and Cefepime -Jermaine Melida, [facility]    Sulfa Antibiotics Rash    Valium [Diazepam] Mental Status Change     DEPRESSED    Ambien [Zolpidem] Mental Status Change    Aspirin GI Intolerance    Ativan [Lorazepam] Mental Status Change    Benadryl [Diphenhydramine] Anxiety     AND MAKES HER HYPER    Biaxin [Clarithromycin] Unknown - Low Severity    Cephalexin Unknown - Low Severity    Clindamycin Unknown - Low Severity    Compazine [Prochlorperazine Edisylate] Rash    Contrast Dye (Echo Or Unknown Ct/Mr) Other (See Comments)     Caused pain in her arm    Doxycycline Rash    Nsaids GI Intolerance    Phenergan [Promethazine Hcl] GI Intolerance    Promethazine GI Intolerance    Vancomycin Itching     Past Medical History:   Diagnosis Date    Anesthesia     REPORTS HAS GOT REAL EMOTIONAL AND HAS BECOME ANGRY BEFORE    Anxiety     Aortic stenosis, severe     HAS BIO VALVE REPLACED     Arthritis     Asthma     Back pain     Blood clotting disorder     Cancer     Cancer associated pain     HODGKINS LYMPHOMA    CHF (congestive heart failure)     Chronic low back pain with sciatica     Chronic pain disorder     COPD (chronic obstructive pulmonary disease)     Coronary artery disease     Diabetes mellitus     TYPE 2    Diabetes mellitus     Dyspnea on effort     GERD (gastroesophageal reflux disease)     H/O lumpectomy     H/O: hysterectomy     Hiatal hernia     History of degenerative disc disease     History of kidney stones     History of pulmonary embolus (PE)     History of transfusion     AS A CHILD NO TRANSFUSION REACTION    History of transfusion     Hodgkin's lymphoma     5/19/23  5 YEARS SINCE LAST CHEMO TX    Hypertension     Immobility     Liver disease     DENIES ANY CURRENT ISSUES    Lupus     Mitral valve prolapse     Nausea vomiting and diarrhea 3/7/2024    Panic disorder     Pelvic pain     Peripheral neuropathy     Personal history of DVT (deep vein thrombosis)     Presence of intrathecal pump     PTSD (post-traumatic stress disorder)     Sacroiliac joint disease     SI (sacroiliac) joint inflammation     Sleep apnea     Venous insufficiency      Past Surgical History:   Procedure Laterality Date    ABDOMINAL SURGERY      BACK SURGERY      SPINAL FUSION     BACK SURGERY      BLADDER REPAIR      CARDIAC CATHETERIZATION N/A 03/06/2019    Procedure: Valvuloplasty;  Surgeon: Wayne Johnson MD;  Location: [facility] CATH INVASIVE LOCATION;  Service: Cardiology    CARDIAC CATHETERIZATION      CARDIAC CATHETERIZATION Left 5/31/2023    Procedure: Cardiac Catheterization/Vascular Study;  Surgeon: Poncho Reid MD;  Location: [facility] CATH INVASIVE LOCATION;  Service: Cardiovascular;  Laterality: Left;    CARDIAC SURGERY      CARDIAC VALVE REPLACEMENT  2021    CHOLECYSTECTOMY      COLONOSCOPY N/A 12/29/2021    Procedure: COLONOSCOPY;  Surgeon: Karine Orlando MD;  Location:  [facility] ENDOSCOPY;  Service: Gastroenterology;  Laterality: N/A;  POOR PREP    COLONOSCOPY N/A 04/13/2022    Procedure: COLONOSCOPY WITH POLYPECTOMY;  Surgeon: Karine Orlando MD;  Location: [facility] ENDOSCOPY;  Service: Gastroenterology;  Laterality: N/A;  COLON POLYP, HEMORRHOIDS, POOR PREP    COLONOSCOPY      DIRECT LARYNGOSCOPY, ESOPHAGOSCOPY, BRONCHOSCOPY N/A 5/22/2023    Procedure: DIRECT LARYNGOSCOPY WITH BIOPSIES , ESOPHAGOSCOPY, BRONCHOSCOPY;  Surgeon: Ronnie Mcgarry MD;  Location: [facility] OR [facility]SC;  Service: ENT;  Laterality: N/A;    ENDOSCOPY N/A 12/29/2021    Procedure: ESOPHAGOGASTRODUODENOSCOPY;  Surgeon: Karine Orlando MD;  Location: [facility] ENDOSCOPY;  Service: Gastroenterology;  Laterality: N/A;  ESOPHAGITIS, GASTRITIS, HIATAL HERNIA    ENDOSCOPY      HYSTERECTOMY      LYMPHADENECTOMY      PAIN PUMP INSERTION/REVISION N/A 10/18/2019    Procedure: PAIN PUMP INSERTION [facility] 10-[facility] [place];  Surgeon: Horace Rios MD;  Location: [facility] OR;  Service: Pain Management    PAIN PUMP INSERTION/REVISION N/A 11/23/2020    Procedure: pain pump removal;  Surgeon: Horace Rios MD;  Location: [facility] OR;  Service: Pain Management;  Laterality: N/A;    PEG TUBE INSERTION N/A 7/26/2023    Procedure: PERCUTANEOUS ENDOSCOPIC GASTROSTOMY TUBE INSERTION;  Surgeon: Kody Mercer MD;  Location: [facility] ENDOSCOPY;  Service: General;  Laterality: N/A;  SUCCESSFUL PEG TUBE PLACE PLACEMENT    PORTACATH PLACEMENT      IN LEFT CHEST REPORTS THAT IT IS NOT FUNCTIONING    SKIN BIOPSY      TONSILLECTOMY      TUBAL ABDOMINAL LIGATION      TUMOR REMOVAL       Family History   Problem Relation Age of Onset    Heart failure Mother     Anxiety disorder Mother     Prostate cancer Father     Depression Sister     Bipolar disorder Sister     Pancreatic cancer Sister     ADD / ADHD Brother     Thyroid cancer Maternal Grandmother     Pancreatic cancer Paternal Grandmother     ADD / ADHD Grandson      ADD / ADHD Granddaughter     ADD / ADHD Nephew     Claudia Hyperthermia Neg Hx        Home Medications:  Prior to Admission medications    Medication Sig Start Date End Date Taking? Authorizing Provider   albuterol (PROVENTIL) 2.5 MG/0.5ML nebulizer solution Take 2.5 mg by nebulization Every 4 (Four) Hours As Needed for Wheezing or Shortness of Air. 2/9/24   Virgil Navarro MD   albuterol sulfate  (90 Base) MCG/ACT inhaler Inhale 2 puffs Every 4 (Four) Hours As Needed for Wheezing. 11/20/23   Katia Wright MD   ALPRAZolam (Xanax) 0.5 MG tablet Take 1 tablet by mouth 2 (Two) Times a Day As Needed for Anxiety for up to 60 days. 1/19/24 3/19/24  Katia Wright MD   azithromycin (Zithromax Z-Tom) 250 MG tablet Take 2 tablets by mouth on day 1, then 1 tablet daily on days 2-5 3/5/24   Katia Wright MD   bacitracin 500 UNIT/GM ointment Apply 1 Application topically to the appropriate area as directed 2 (Two) Times a Day. 1/30/24   Katia Wright MD   cetirizine (zyrTEC) 10 MG tablet Take 1 tablet by mouth Daily. 10/31/23   Nette Jiménez MD   clopidogrel (PLAVIX) 75 MG tablet Take 1 tablet by mouth Daily. 2/21/24   Katia Wright MD   Fluticasone-Umeclidin-Vilant (Trelegy Ellipta) 200-62.5-25 MCG/ACT aerosol powder  Inhale 1 puff Daily for 30 days. 2/9/24 3/10/24  Virgil Navarro MD   furosemide (LASIX) 40 MG tablet Take 1 tablet by mouth Daily.    Nette Jiménez MD   glucose blood test strip Use as instructed 2/12/24   Katia Wright MD   HYDROcodone-acetaminophen (NORCO) 5-325 MG per tablet Take 1 tablet by mouth Daily As Needed for Moderate Pain.    Nette Jiménez MD   hydroxychloroquine (PLAQUENIL) 200 MG tablet Take 1 tablet by mouth Daily.    Nette Jiménez MD   Lancets (OneTouch Delica Plus [id number]) misc  2/6/24   Nette Jiménez MD   midodrine (PROAMATINE) 10 MG tablet Take 1 tablet by mouth 3 (Three) Times a Day Before Meals for 30 days. 2/9/24 3/10/24  Virgil Navarro MD    mupirocin (BACTROBAN) 2 % ointment Apply 1 application  topically to the appropriate area as directed 3 (Three) Times a Day. 11/16/23   Katia Wright MD   nicotine polacrilex (COMMIT) 2 MG lozenge Dissolve 1 lozenge in the mouth As Needed for Smoking Cessation. 2/9/24   Virgil Navarro MD   O2 (OXYGEN) Inhale 1 (One) Time.    ProviderNette MD   ondansetron (Zofran) 4 MG tablet Take 1 tablet by mouth Every 12 (Twelve) Hours As Needed for Nausea or Vomiting. 1/22/24   Katia Wright MD   pain patient supplied pump by Intrathecal route Continuous.   Type of Medication: Hydromorphone 15 mg/ml  and Bupivacaine 0.5 mg/ml  Provider:Dr. Boudreaux  Provider number: [phone]  Basal Dose: Hydromorphone 7.518 mg/day Bupivacaine 0.57664 mg/day  ( MAX of Hydromorphone 9.940 mg/ day; Bupivacaine 0.94947 mg /day)  Bolus Dose:Hydromorphone 0.500 mg, Bupivacaine 0.06893 mg  Lockout 3 hours. 1 Bolus per every 3 hours max of 5 bolus per day  Next refill/ Alarm Date- 03/25/2024  Pump manufacture:[employer]    Provider, MD Nette   pantoprazole (PROTONIX) 40 MG EC tablet Take 1 tablet by mouth Every Morning for 30 days. 2/10/24 3/11/24  Virgil Navarro MD   predniSONE (DELTASONE) 10 MG tablet Take 1 tablet by mouth Daily. 2/19/24   Katia Wright MD        Social History:   Social History     Tobacco Use    Smoking status: Every Day     Current packs/day: 1.50     Average packs/day: 1.5 packs/day for 45.2 years (67.8 ttl pk-yrs)     Types: Cigarettes     Start date: 1979    Smokeless tobacco: Never   Vaping Use    Vaping status: Never Used   Substance Use Topics    Alcohol use: Never    Drug use: Never         Review of Systems:  Review of Systems   Constitutional:  Negative for chills and fever.   HENT:  Negative for congestion, rhinorrhea and sore throat.    Eyes:  Negative for pain and visual disturbance.   Respiratory:  Negative for apnea, cough, chest tightness and shortness of breath.    Cardiovascular:  Negative for  "chest pain and palpitations.   Gastrointestinal:  Positive for diarrhea, nausea and vomiting. Negative for abdominal pain.   Genitourinary:  Negative for difficulty urinating and dysuria.   Musculoskeletal:  Negative for joint swelling and myalgias.   Skin:  Negative for color change.   Neurological:  Negative for seizures and headaches.   Psychiatric/Behavioral: Negative.     All other systems reviewed and are negative.       Physical Exam:  /64 (BP Location: Left arm, Patient Position: Sitting)   Pulse (!) 123   Temp 97.4 °F (36.3 °C) (Oral)   Resp 20   Ht 170.2 cm (67\")   Wt 59.8 kg (131 lb 12.8 oz)   SpO2 94%   BMI 20.64 kg/m²     Physical Exam  Vitals and nursing note reviewed.   Constitutional:       General: She is not in acute distress.     Appearance: Normal appearance. She is not toxic-appearing.   HENT:      Head: Normocephalic and atraumatic.      Jaw: There is normal jaw occlusion.   Eyes:      General: Lids are normal.      Extraocular Movements: Extraocular movements intact.      Conjunctiva/sclera: Conjunctivae normal.      Pupils: Pupils are equal, round, and reactive to light.   Cardiovascular:      Rate and Rhythm: Normal rate and regular rhythm.      Pulses: Normal pulses.      Heart sounds: Normal heart sounds.   Pulmonary:      Effort: Pulmonary effort is normal. No respiratory distress.      Breath sounds: Normal breath sounds. No wheezing or rhonchi.   Abdominal:      General: Abdomen is flat.      Palpations: Abdomen is soft.      Tenderness: There is no abdominal tenderness. There is no guarding or rebound.   Musculoskeletal:         General: Normal range of motion.      Cervical back: Normal range of motion and neck supple.      Right lower leg: No edema.      Left lower leg: No edema.   Skin:     General: Skin is warm and dry.   Neurological:      Mental Status: She is alert and oriented to person, place, and time. Mental status is at baseline.   Psychiatric:         Mood and " Affect: Mood normal.                  Procedures:  Procedures      Medical Decision Making:      Comorbidities that affect care:    Hodgkin's lymphoma    External Notes reviewed:    Hospital Discharge Summary: Patient was last admitted for sepsis      The following orders were placed and all results were independently analyzed by me:  Orders Placed This Encounter   Procedures    COVID PRE-OP / PRE-PROCEDURE SCREENING ORDER (NO ISOLATION) - Swab, Nasopharynx    COVID-19, FLU A/B, RSV PCR 1 HR TAT - Swab, Nasopharynx    CT Abdomen Pelvis Without Contrast    Daniel Draw    Comprehensive Metabolic Panel    Lipase    Urinalysis With Microscopic If Indicated (No Culture) - Urine, Clean Catch    Lactic Acid, Plasma    CBC Auto Differential    STAT Lactic Acid, Reflex    Urinalysis, Microscopic Only - Urine, Clean Catch    NPO Diet NPO Type: Strict NPO    Undress & Gown    Inpatient Hospitalist Consult    ECG 12 Lead Chest Pain    ECG 12 Lead Tachycardia    Insert Peripheral IV    Initiate Observation Status    CBC & Differential    Green Top (Gel)    Lavender Top    Gold Top - SST    Light Blue Top       Medications Given in the Emergency Department:  Medications   sodium chloride 0.9 % flush 10 mL (has no administration in time range)   sodium chloride 0.9 % bolus 2,000 mL (2,000 mL Intravenous New Bag 3/7/24 1542)        ED Course:         Labs:    Lab Results (last 24 hours)       Procedure Component Value Units Date/Time    CBC & Differential [[id number]]  (Abnormal) Collected: 03/07/24 1331    Specimen: Blood Updated: 03/07/24 1340    Narrative:      The following orders were created for panel order CBC & Differential.  Procedure                               Abnormality         Status                     ---------                               -----------         ------                     CBC Auto Differential[[id number]]        Abnormal            Final result                 Please view results for these tests on  the individual orders.    Comprehensive Metabolic Panel [[id number]]  (Abnormal) Collected: 03/07/24 1331    Specimen: Blood Updated: 03/07/24 1420     Glucose 131 mg/dL      BUN 21 mg/dL      Creatinine 0.55 mg/dL      Sodium 141 mmol/L      Potassium 4.3 mmol/L      Comment: Slight hemolysis detected by analyzer. Result may be falsely elevated.        Chloride 100 mmol/L      CO2 24.4 mmol/L      Calcium 9.6 mg/dL      Total Protein 8.2 g/dL      Albumin 4.5 g/dL      ALT (SGPT) 12 U/L      AST (SGOT) 22 U/L      Comment: Slight hemolysis detected by analyzer. Result may be falsely elevated.        Alkaline Phosphatase 78 U/L      Total Bilirubin 0.4 mg/dL      Globulin 3.7 gm/dL      A/G Ratio 1.2 g/dL      BUN/Creatinine Ratio 38.2     Anion Gap 16.6 mmol/L      eGFR 106.4 mL/min/1.73     Narrative:      GFR Normal >60  Chronic Kidney Disease <60  Kidney Failure <15      Lipase [[id number]]  (Normal) Collected: 03/07/24 1331    Specimen: Blood Updated: 03/07/24 1417     Lipase 39 U/L     Lactic Acid, Plasma [[id number]]  (Abnormal) Collected: 03/07/24 1331    Specimen: Blood Updated: 03/07/24 1421     Lactate 2.1 mmol/L     CBC Auto Differential [[id number]]  (Abnormal) Collected: 03/07/24 1331    Specimen: Blood Updated: 03/07/24 1340     WBC 7.09 10*3/mm3      RBC 5.60 10*6/mm3      Hemoglobin 15.7 g/dL      Hematocrit 51.2 %      MCV 91.4 fL      MCH 28.0 pg      MCHC 30.7 g/dL      RDW 14.9 %      RDW-SD 50.0 fl      MPV 10.5 fL      Platelets 159 10*3/mm3      Neutrophil % 89.0 %      Lymphocyte % 4.9 %      Monocyte % 4.2 %      Eosinophil % 1.4 %      Basophil % 0.1 %      Immature Grans % 0.4 %      Neutrophils, Absolute 6.30 10*3/mm3      Lymphocytes, Absolute 0.35 10*3/mm3      Monocytes, Absolute 0.30 10*3/mm3      Eosinophils, Absolute 0.10 10*3/mm3      Basophils, Absolute 0.01 10*3/mm3      Immature Grans, Absolute 0.03 10*3/mm3      nRBC 0.0 /100 WBC     Urinalysis With Microscopic If Indicated (No  Culture) - Urine, Clean Catch [[id number]]  (Abnormal) Collected: 03/07/24 1536    Specimen: Urine, Clean Catch Updated: 03/07/24 1607     Color, UA Yellow     Appearance, UA Clear     pH, UA <=5.0     Specific Gravity, UA 1.027     Glucose, UA Negative     Ketones, UA Negative     Bilirubin, UA Negative     Blood, UA Negative     Protein, UA Trace     Leuk Esterase, UA Trace     Nitrite, UA Negative     Urobilinogen, UA 0.2 E.U./dL    Urinalysis, Microscopic Only - Urine, Clean Catch [[id number]]  (Abnormal) Collected: 03/07/24 1536    Specimen: Urine, Clean Catch Updated: 03/07/24 1607     RBC, UA 0-2 /HPF      WBC, UA 6-10 /HPF      Bacteria, UA None Seen /HPF      Squamous Epithelial Cells, UA 0-2 /HPF      Hyaline Casts, UA 3-6 /LPF      Methodology Automated Microscopy    COVID PRE-OP / PRE-PROCEDURE SCREENING ORDER (NO ISOLATION) - Swab, Nasopharynx [[id number]]  (Normal) Collected: 03/07/24 1659    Specimen: Swab from Nasopharynx Updated: 03/07/24 1742    Narrative:      The following orders were created for panel order COVID PRE-OP / PRE-PROCEDURE SCREENING ORDER (NO ISOLATION) - Swab, Nasopharynx.  Procedure                               Abnormality         Status                     ---------                               -----------         ------                     COVID-19, FLU A/B, RSV P...[[id number]]  Normal              Final result                 Please view results for these tests on the individual orders.    COVID-19, FLU A/B, RSV PCR 1 HR TAT - Swab, Nasopharynx [[id number]]  (Normal) Collected: 03/07/24 1659    Specimen: Swab from Nasopharynx Updated: 03/07/24 1742     COVID19 Not Detected     Influenza A PCR Not Detected     Influenza B PCR Not Detected     RSV, PCR Not Detected    Narrative:      Fact sheet for providers: https://www.fda.gov/media/871979/download    Fact sheet for patients: https://www.fda.gov/media/578920/download    Test performed by PCR.             Imaging:    CT Abdomen  Pelvis Without Contrast    Result Date: 3/7/2024  PROCEDURE: CT ABDOMEN PELVIS WO CONTRAST  COMPARISON: [facility], CT, CT CHEST WO CONTRAST DIAGNOSTIC, 9/05/2023, 11:09.  [facility], CT, CT ABDOMEN PELVIS WO CONTRAST, 2/05/2024, 2:25.  INDICATIONS: ABDOMINAL PAIN, NAUSEA/VOMITING/DIARRHEA  TECHNIQUE: CT images were created without intravenous contrast.   PROTOCOL:   Standard imaging protocol performed    RADIATION:   DLP: 332mGy*cm   Automated exposure control was utilized to minimize radiation dose.  FINDINGS:    Lung bases:  There is evidence of old granulomatous disease.  Focal ground-glass opacity posteriorly left lower lobe.  No free air is noted below the diaphragm.  Organs:  Patient is status post cholecystectomy.  Limited noncontrast imaging liver is is is, left kidney, spleen, pancreas and adrenal glands are grossly unremarkable.  Spleen is borderline enlarged.  Punctate nonobstructing calculus noted right kidney  GI tract:  Limited noncontrast imaging of the stomach and the small bowel demonstrates no definite acute abnormality.  Is is is ileocecal valve is grossly unremarkable.  Colon demonstrates no acute abnormality.  No suspicious mesenteric adenopathy or fluid collection  Pelvis:  Bladder is incompletely distended and grossly unremarkable in appearance.  Patient appears to be status post hysterectomy  Retroperitoneum:  Aorta is normal in caliber.  Atherosclerotic changes noted.  There is no suspicious retroperitoneal adenopathy is  Bones and soft tissues:  Neurostimulator device again noted.  Leads from a prior neurostimulator device noted.    Advanced degenerative changes noted at the hips.        1. Ground-glass opacity left lung base which may represent underlying viral or atypical pneumonia.  Please correlate with Covid 19 status 2. No definite acute abnormality noted. 3. Additional findings as above     RAHUL MONTERO MD       Electronically Signed and Approved By:  RAHUL MONTERO MD on 3/07/2024 at 16:16                Differential Diagnosis and Discussion:    Diarrhea: Differential diagnosis includes but is not limited to malabsorption syndrome, bacterial infection, carcinoid syndrome, pancreatic hypersecretion, viral infection, celiac sprue, Crohn's disease, ulcerative colitis, ischemic colitis, colitis, hypermotility, and irritable bowel syndrome.    All labs were reviewed and interpreted by me.  CT scan radiology impression was interpreted by me.    MDM     The patient´s CBC that was reviewed and interpreted by me shows no abnormalities of critical concern. Of note, there is no anemia requiring a blood transfusion and the platelet count is acceptable.  The patient´s CMP that was reviewed and interpretted by me shows no abnormalities of critical concern. Of note, the patient´s sodium and potassium are acceptable. The patient´s liver enzymes are unremarkable. The patient´s renal function (creatinine) is preserved. The patient has a normal anion gap.  CT scan is negative for acute intra-abdominal pathology.  Patient remained tachycardic despite 2 L normal saline.          Patient Care Considerations:    I considered giving antibiotics, however no bacterial source of infection was found.      Consultants/Shared Management Plan:    Case was discussed with Dr. Dandy who agrees with admission.    Social Determinants of Health:    Patient is independent, reliable, and has access to care.       Disposition and Care Coordination:    Admit:   Through independent evaluation of the patient's history, physical, and imperical data, the patient meets criteria for inpatient admission to the hospital.        Final diagnoses:   Diarrhea, unspecified type        ED Disposition       ED Disposition   Decision to Admit    Condition   --    Comment   Level of Care: Remote Telemetry [26]   Diagnosis: Nausea vomiting and diarrhea [[id number]]                 This medical record created using voice  recognition software.             Makenzie Xie MD  03/07/24 2018

## 2024-03-08 ENCOUNTER — READMISSION MANAGEMENT (OUTPATIENT)
Dept: CALL CENTER | Facility: HOSPITAL | Age: 59
End: 2024-03-08
Payer: COMMERCIAL

## 2024-03-08 VITALS
RESPIRATION RATE: 18 BRPM | BODY MASS INDEX: 20.27 KG/M2 | OXYGEN SATURATION: 91 % | WEIGHT: 126.1 LBS | DIASTOLIC BLOOD PRESSURE: 70 MMHG | TEMPERATURE: 98.2 F | HEIGHT: 66 IN | HEART RATE: 88 BPM | SYSTOLIC BLOOD PRESSURE: 103 MMHG

## 2024-03-08 LAB
027 TOXIN: NORMAL
ALBUMIN SERPL-MCNC: 3.3 G/DL (ref 3.5–5.2)
ALBUMIN/GLOB SERPL: 1.2 G/DL
ALP SERPL-CCNC: 60 U/L (ref 39–117)
ALT SERPL W P-5'-P-CCNC: 8 U/L (ref 1–33)
ANION GAP SERPL CALCULATED.3IONS-SCNC: 9 MMOL/L (ref 5–15)
AST SERPL-CCNC: 15 U/L (ref 1–32)
BASOPHILS # BLD AUTO: 0.01 10*3/MM3 (ref 0–0.2)
BASOPHILS NFR BLD AUTO: 0.3 % (ref 0–1.5)
BILIRUB SERPL-MCNC: 0.4 MG/DL (ref 0–1.2)
BUN SERPL-MCNC: 13 MG/DL (ref 6–20)
BUN/CREAT SERPL: 32.5 (ref 7–25)
C COLI+JEJ+UPSA DNA STL QL NAA+NON-PROBE: NOT DETECTED
C DIFF TOX GENS STL QL NAA+PROBE: NEGATIVE
CALCIUM SPEC-SCNC: 8.7 MG/DL (ref 8.6–10.5)
CHLORIDE SERPL-SCNC: 101 MMOL/L (ref 98–107)
CO2 SERPL-SCNC: 27 MMOL/L (ref 22–29)
CREAT SERPL-MCNC: 0.4 MG/DL (ref 0.57–1)
DEPRECATED RDW RBC AUTO: 49.8 FL (ref 37–54)
EC STX1+STX2 GENES STL QL NAA+NON-PROBE: NOT DETECTED
EGFRCR SERPLBLD CKD-EPI 2021: 114.9 ML/MIN/1.73
EOSINOPHIL # BLD AUTO: 0.1 10*3/MM3 (ref 0–0.4)
EOSINOPHIL NFR BLD AUTO: 3.3 % (ref 0.3–6.2)
ERYTHROCYTE [DISTWIDTH] IN BLOOD BY AUTOMATED COUNT: 14.9 % (ref 12.3–15.4)
GLOBULIN UR ELPH-MCNC: 2.8 GM/DL
GLUCOSE BLDC GLUCOMTR-MCNC: 128 MG/DL (ref 70–99)
GLUCOSE BLDC GLUCOMTR-MCNC: 129 MG/DL (ref 70–99)
GLUCOSE BLDC GLUCOMTR-MCNC: 91 MG/DL (ref 70–99)
GLUCOSE SERPL-MCNC: 95 MG/DL (ref 65–99)
HCT VFR BLD AUTO: 38.8 % (ref 34–46.6)
HGB BLD-MCNC: 11.8 G/DL (ref 12–15.9)
IMM GRANULOCYTES # BLD AUTO: 0.01 10*3/MM3 (ref 0–0.05)
IMM GRANULOCYTES NFR BLD AUTO: 0.3 % (ref 0–0.5)
LYMPHOCYTES # BLD AUTO: 0.35 10*3/MM3 (ref 0.7–3.1)
LYMPHOCYTES NFR BLD AUTO: 11.5 % (ref 19.6–45.3)
MAGNESIUM SERPL-MCNC: 1.7 MG/DL (ref 1.6–2.6)
MCH RBC QN AUTO: 27.7 PG (ref 26.6–33)
MCHC RBC AUTO-ENTMCNC: 30.4 G/DL (ref 31.5–35.7)
MCV RBC AUTO: 91.1 FL (ref 79–97)
MONOCYTES # BLD AUTO: 0.23 10*3/MM3 (ref 0.1–0.9)
MONOCYTES NFR BLD AUTO: 7.6 % (ref 5–12)
NEUTROPHILS NFR BLD AUTO: 2.34 10*3/MM3 (ref 1.7–7)
NEUTROPHILS NFR BLD AUTO: 77 % (ref 42.7–76)
NRBC BLD AUTO-RTO: 0 /100 WBC (ref 0–0.2)
PHOSPHATE SERPL-MCNC: 4.2 MG/DL (ref 2.5–4.5)
PLATELET # BLD AUTO: 125 10*3/MM3 (ref 140–450)
PMV BLD AUTO: 10.5 FL (ref 6–12)
POTASSIUM SERPL-SCNC: 3.9 MMOL/L (ref 3.5–5.2)
PROT SERPL-MCNC: 6.1 G/DL (ref 6–8.5)
QT INTERVAL: 353 MS
QTC INTERVAL: 462 MS
RBC # BLD AUTO: 4.26 10*6/MM3 (ref 3.77–5.28)
S ENT+BONG DNA STL QL NAA+NON-PROBE: NOT DETECTED
SHIGELLA SP+EIEC IPAH ST NAA+NON-PROBE: NOT DETECTED
SODIUM SERPL-SCNC: 137 MMOL/L (ref 136–145)
WBC NRBC COR # BLD AUTO: 3.04 10*3/MM3 (ref 3.4–10.8)

## 2024-03-08 PROCEDURE — 94799 UNLISTED PULMONARY SVC/PX: CPT

## 2024-03-08 PROCEDURE — 83735 ASSAY OF MAGNESIUM: CPT | Performed by: STUDENT IN AN ORGANIZED HEALTH CARE EDUCATION/TRAINING PROGRAM

## 2024-03-08 PROCEDURE — 85025 COMPLETE CBC W/AUTO DIFF WBC: CPT | Performed by: STUDENT IN AN ORGANIZED HEALTH CARE EDUCATION/TRAINING PROGRAM

## 2024-03-08 PROCEDURE — 96372 THER/PROPH/DIAG INJ SC/IM: CPT

## 2024-03-08 PROCEDURE — 87505 NFCT AGENT DETECTION GI: CPT | Performed by: STUDENT IN AN ORGANIZED HEALTH CARE EDUCATION/TRAINING PROGRAM

## 2024-03-08 PROCEDURE — G0378 HOSPITAL OBSERVATION PER HR: HCPCS

## 2024-03-08 PROCEDURE — 94664 DEMO&/EVAL PT USE INHALER: CPT

## 2024-03-08 PROCEDURE — 63710000001 PREDNISONE PER 5 MG: Performed by: STUDENT IN AN ORGANIZED HEALTH CARE EDUCATION/TRAINING PROGRAM

## 2024-03-08 PROCEDURE — 80053 COMPREHEN METABOLIC PANEL: CPT | Performed by: STUDENT IN AN ORGANIZED HEALTH CARE EDUCATION/TRAINING PROGRAM

## 2024-03-08 PROCEDURE — 25010000002 HEPARIN (PORCINE) PER 1000 UNITS: Performed by: STUDENT IN AN ORGANIZED HEALTH CARE EDUCATION/TRAINING PROGRAM

## 2024-03-08 PROCEDURE — 99239 HOSP IP/OBS DSCHRG MGMT >30: CPT | Performed by: INTERNAL MEDICINE

## 2024-03-08 PROCEDURE — 82948 REAGENT STRIP/BLOOD GLUCOSE: CPT

## 2024-03-08 PROCEDURE — 87493 C DIFF AMPLIFIED PROBE: CPT | Performed by: STUDENT IN AN ORGANIZED HEALTH CARE EDUCATION/TRAINING PROGRAM

## 2024-03-08 PROCEDURE — 84100 ASSAY OF PHOSPHORUS: CPT | Performed by: STUDENT IN AN ORGANIZED HEALTH CARE EDUCATION/TRAINING PROGRAM

## 2024-03-08 PROCEDURE — 82948 REAGENT STRIP/BLOOD GLUCOSE: CPT | Performed by: STUDENT IN AN ORGANIZED HEALTH CARE EDUCATION/TRAINING PROGRAM

## 2024-03-08 RX ORDER — ACETAMINOPHEN 325 MG/1
650 TABLET ORAL EVERY 4 HOURS PRN
Status: DISCONTINUED | OUTPATIENT
Start: 2024-03-08 | End: 2024-03-08 | Stop reason: HOSPADM

## 2024-03-08 RX ORDER — AZITHROMYCIN 250 MG/1
500 TABLET, FILM COATED ORAL DAILY
Qty: 4 TABLET | Refills: 0 | Status: SHIPPED | OUTPATIENT
Start: 2024-03-09 | End: 2024-03-11

## 2024-03-08 RX ORDER — AZITHROMYCIN 250 MG/1
500 TABLET, FILM COATED ORAL
Status: DISCONTINUED | OUTPATIENT
Start: 2024-03-08 | End: 2024-03-08 | Stop reason: HOSPADM

## 2024-03-08 RX ADMIN — HYDROXYCHLOROQUINE SULFATE 200 MG: 200 TABLET ORAL at 09:03

## 2024-03-08 RX ADMIN — ARFORMOTEROL TARTRATE 15 MCG: 15 SOLUTION RESPIRATORY (INHALATION) at 07:56

## 2024-03-08 RX ADMIN — BUDESONIDE 0.5 MG: 0.5 INHALANT RESPIRATORY (INHALATION) at 07:56

## 2024-03-08 RX ADMIN — MIDODRINE HYDROCHLORIDE 10 MG: 10 TABLET ORAL at 06:35

## 2024-03-08 RX ADMIN — MIDODRINE HYDROCHLORIDE 10 MG: 10 TABLET ORAL at 11:28

## 2024-03-08 RX ADMIN — HEPARIN SODIUM 5000 UNITS: 5000 INJECTION INTRAVENOUS; SUBCUTANEOUS at 06:20

## 2024-03-08 RX ADMIN — CLOPIDOGREL BISULFATE 75 MG: 75 TABLET ORAL at 09:03

## 2024-03-08 RX ADMIN — ALPRAZOLAM 0.5 MG: 0.25 TABLET ORAL at 09:03

## 2024-03-08 RX ADMIN — PREDNISONE 10 MG: 10 TABLET ORAL at 09:03

## 2024-03-08 RX ADMIN — PANTOPRAZOLE SODIUM 40 MG: 40 TABLET, DELAYED RELEASE ORAL at 06:20

## 2024-03-08 RX ADMIN — AZITHROMYCIN DIHYDRATE 500 MG: 250 TABLET ORAL at 13:04

## 2024-03-08 RX ADMIN — ACETAMINOPHEN 650 MG: 325 TABLET ORAL at 02:56

## 2024-03-08 RX ADMIN — Medication 10 ML: at 09:04

## 2024-03-08 NOTE — PLAN OF CARE
Goal Outcome Evaluation:      Pt discharging home via private vehicle w/ daughter. AVS reviewed. IV removed w/o complications.

## 2024-03-08 NOTE — PLAN OF CARE
Goal Outcome Evaluation:         Pt is a new admit, on enteric precautions r/o c diff. Still waiting for bm to send stool specimen for exam. Pt has intrathecal pain pump on right lower back. Tylenol 650 mg given x1. SBP in low 100s with MAP in mid 70s. No other complaints. Will continue POC.

## 2024-03-08 NOTE — DISCHARGE SUMMARY
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 female with a past medical history of squamous cell carcinoma of the supraglottic larynx status postradiation, lupus, non-Hodgkin's lymphoma, COPD, CAD, type 2 diabetes mellitus, hypertension, PTSD, aortic stenosis s/p TAVR presented to the ED for evaluation of nausea, vomiting, diarrhea. Multiple family members has been having similar symptoms in the last few days likely due to some viral infection.  Patient noted several episodes of diarrhea morning of admission, experiencing resulting dehydration and weakness. In the ED, vital signs temperature 99.4, pulse 118, respiratory 20, blood pressure 114/77 on 2 L of nasal, saturating at 97%.  Labs showed sodium 141, potassium 4.3, bicarb 24.4, anion gap 16.6, creatinine 0.45, rest of the CMP within normal limits, lactic acid 2.1, normal WBC, hemoglobin 15.7, platelets 159, urinalysis is noninfectious, COVID flu RSV negative.  CT abdomen pelvis without contrast showed groundglass opacity left lung base which may represent underlying viral or atypical pneumonia.  No definite acute abnormality noted.  Received IV fluids in the ED. stool studies obtained, negative for C. difficile.  Patient's enteric stool panel also negative.   Following morning patient insisting on discharging soon as possible.  Patient's repeat labs show no renal dysfunction, normal electrolytes.  Patient's lactate initially 2.1 down to 0.7 with IV fluids.  Patient states she is no longer taking her midodrine at home but as her blood pressure is borderline instructed patient to continue taking this medication at home.  Given patient's history of SLE on steroids and hydroxychloroquine, patient was prescribed azithromycin as an outpatient to treat diarrhea.  Patient was provided 500 mg and patient instructed to take 500 mg for the next 2 days for total 3 days.  Patient seen on date of discharge, clinically and hemodynamically stable.  Patient provided concerning signs and symptoms prompting immediate medical attention, patient understanding and agreeable      DISCHARGE Follow Up Recommendations for labs and diagnostics:   Follow-up with PCP soon as possible  Patient instructed to continue monitoring blood pressure at home  Remain hydrated, focusing on electrolyte drinks      Day of Discharge     Vital Signs:  Temp:  [98.2 °F (36.8 °C)-100.9 °F (38.3 °C)] 98.2 °F (36.8 °C)  Heart Rate:  [] 88  Resp:  [18-24] 18  BP: ()/(56-77) 103/70  Flow (L/min):  [2-3] 2.5  Physical Exam:               Constitutional: Awake, alert, no acute distress              Eyes: Pupils equal, sclerae anicteric, no conjunctival injection              HENT: NCAT, mucous membranes moist              Respiratory: Clear to auscultation bilaterally, nonlabored respirations               Cardiovascular: RRR, no murmurs, rubs, or gallops, palpable pedal pulses bilaterally              Gastrointestinal: Positive bowel sounds, soft, nontender, nondistended              Musculoskeletal: No bilateral ankle edema, no clubbing or cyanosis to extremities              Neurologic: Oriented x 3, strength symmetric in all extremities, Cranial Nerves grossly intact to confrontation, speech clear               Skin: No rashes     Discharge Details        Discharge Medications        Changes to Medications        Instructions Start Date   azithromycin 250 MG tablet  Commonly known as: ZITHROMAX  What changed:   how much to take  how to take this  when to take this  additional instructions   500 mg, Oral, Daily   Start Date: March 9, 2024            Continue These Medications        Instructions Start Date   albuterol sulfate  (90 Base) MCG/ACT inhaler  Commonly known as: PROVENTIL HFA;VENTOLIN HFA;PROAIR HFA   2 puffs, Inhalation, Every 4 Hours PRN      albuterol 2.5 MG/0.5ML nebulizer solution  Commonly known as: PROVENTIL   2.5 mg, Nebulization, Every 4 Hours PRN      ALPRAZolam 0.5 MG tablet  Commonly known as: Xanax   0.5 mg, Oral, 2 Times Daily PRN      bacitracin 500 UNIT/GM ointment   0.9 g, Topical, 2 Times Daily      cetirizine 10 MG tablet  Commonly known as: zyrTEC   1 tablet, Oral, Daily      clopidogrel 75 MG tablet  Commonly known as: PLAVIX   75 mg, Oral, Daily      glucose blood test strip   Use as instructed      HYDROcodone-acetaminophen 5-325 MG per tablet  Commonly known as: NORCO   1 tablet, Oral, Daily PRN      hydroxychloroquine 200 MG tablet  Commonly known as: PLAQUENIL   200 mg, Oral, Daily      midodrine 10 MG tablet  Commonly known as: PROAMATINE   10 mg, Oral, 3 Times Daily Before Meals      mupirocin 2 % ointment  Commonly known as: BACTROBAN   1 application , Topical, 3 Times Daily      nicotine polacrilex 2 MG lozenge  Commonly known as: COMMIT   2 mg, Mouth/Throat, As Needed      O2  Commonly known as: OXYGEN   Inhalation, Once      ondansetron 4 MG tablet  Commonly known as: Zofran   4 mg, Oral, Every 12 Hours PRN      OneTouch Delica Plus [id number] misc       pain patient supplied pump   Intrathecal, Continuous,  Type of Medication: Hydromorphone 15 mg/ml  and Bupivacaine 0.5 mg/ml Provider:Dr. Boudreaux Provider number: [phone] Basal Dose: Hydromorphone 7.518 mg/day Bupivacaine  0.02057 mg/day ( MAX of Hydromorphone 9.940 mg/ day; Bupivacaine 0.44623 mg /day) Bolus Dose:Hydromorphone 0.500 mg, Bupivacaine 0.88990 mg Lockout 3 hours. 1 Bolus per every 3 hours max of 5 bolus per day Next refill/ Alarm Date- 4/24/24 Pump manufacture:[employer]tronic          pantoprazole 40 MG EC tablet  Commonly known as: PROTONIX   40 mg, Oral, Every Early Morning      predniSONE 10 MG tablet  Commonly known as: DELTASONE   10 mg, Oral, Daily      Trelegy Ellipta 200-62.5-25 MCG/ACT inhaler  Generic drug: Fluticasone-Umeclidin-Vilant   1 puff, Inhalation, Daily             Stop These Medications      furosemide 40 MG tablet  Commonly known as: LASIX              Allergies   Allergen Reactions    Amoxicillin Shortness Of Breath     Has tolerated Cefazolin, Ceftriaxone, and Cefepime -[facility]    Ceclor [Cefaclor] Shortness Of Breath     Has tolerated Cefazolin, Ceftriaxone, and Cefepime -[facility]      Penicillins Shortness Of Breath     Has tolerated Cefazolin, Ceftriaxone, and Cefepime -[facility]    Sulfa Antibiotics Rash    Valium [Diazepam] Mental Status Change     DEPRESSED    Ambien [Zolpidem] Mental Status Change    Aspirin GI Intolerance    Ativan [Lorazepam] Mental Status Change    Benadryl [Diphenhydramine] Anxiety     AND MAKES HER HYPER    Biaxin [Clarithromycin] Unknown - Low Severity    Cephalexin Unknown - Low Severity    Clindamycin Unknown - Low Severity    Compazine [Prochlorperazine Edisylate] Rash    Contrast Dye (Echo Or Unknown Ct/Mr) Other (See Comments)     Caused pain in her arm    Doxycycline Rash    Nsaids GI Intolerance    Phenergan [Promethazine Hcl] GI Intolerance    Promethazine GI Intolerance    Vancomycin Itching       Discharge Disposition:  Home or Self Care    Diet:  Hospital:  Diet Order   Procedures    Diet: Cardiac, Diabetic; Healthy Heart (2-3 Na+); Consistent Carbohydrate; Fluid Consistency: Thin (IDDSI 0)       Discharge Activity:    Activity Instructions       Activity as Tolerated              CODE STATUS:  Code Status and Medical Interventions:   Ordered at: 03/07/24 2125     Level Of Support Discussed With:    Patient     Code Status (Patient has no pulse and is not breathing):    CPR (Attempt to Resuscitate)     Medical Interventions (Patient has pulse or is breathing):    Full Support         Future Appointments   Date Time Provider Department Center   3/19/2024  2:00 PM Katia Wright MD [facility] [place] [facility]   3/21/2024 10:30 AM Goyo Nunez MD [facility] [place] [facility]   4/9/2024 11:00 AM Katia Wright MD [facility] [place] [facility]       Additional Instructions for the Follow-ups that You Need to Schedule       Discharge Follow-up with PCP   As directed       Currently Documented PCP:    Katia Wright MD    PCP Phone Number:    [phone]     Follow Up Details: In less than one week                Pertinent  and/or Most Recent Results     PROCEDURES:   NA    LAB RESULTS:      Lab 03/08/24  0913 03/07/24 2337 03/07/24 2030 03/07/24  1331   WBC 3.04*  --   --  7.09   HEMOGLOBIN 11.8*  --   --  15.7   HEMATOCRIT 38.8  --   --  51.2*   PLATELETS 125*  --   --  159   NEUTROS ABS 2.34  --   --  6.30   IMMATURE GRANS (ABS) 0.01  --   --  0.03   LYMPHS ABS 0.35*  --   --  0.35*   MONOS ABS 0.23  --   --  0.30   EOS ABS 0.10  --   --  0.10   MCV 91.1  --   --  91.4   LACTATE  --   --  0.7 2.1*   LACTATE, ARTERIAL  --  0.84  --   --          Lab 03/08/24  0913 03/07/24 2337 03/07/24  1331   SODIUM 137  --  141   SODIUM, ARTERIAL  --  135.3*  --    POTASSIUM 3.9  --  4.3   CHLORIDE 101  --  100   CO2 27.0  --  24.4   ANION GAP 9.0  --  16.6*   BUN 13  --  21*   CREATININE 0.40*  --  0.55*   EGFR 114.9  --  106.4   GLUCOSE 95  --  131*   GLUCOSE, ARTERIAL  --  133*  --    CALCIUM 8.7  --  9.6   IONIZED CALCIUM  --  1.13  --    MAGNESIUM 1.7  --   --    PHOSPHORUS 4.2  --   --          Lab 03/08/24  0913 03/07/24  1331   TOTAL PROTEIN 6.1 8.2   ALBUMIN 3.3* 4.5    GLOBULIN 2.8 3.7   ALT (SGPT) 8 12   AST (SGOT) 15 22   BILIRUBIN 0.4 0.4   ALK PHOS 60 78   LIPASE  --  39                     Lab 03/07/24  2337   PH, ARTERIAL 7.395   PCO2, ARTERIAL 45.2*   PO2 .2*   O2 SATURATION ART 97.6   FIO2 32   HCO3 ART 27.1*   BASE EXCESS ART 1.7   CARBOXYHEMOGLOBIN 2.2*     Brief Urine Lab Results  (Last result in the past 365 days)        Color   Clarity   Blood   Leuk Est   Nitrite   Protein   CREAT   Urine HCG        03/07/24 1536 Yellow   Clear   Negative   Trace   Negative   Trace                 Microbiology Results (last 10 days)       Procedure Component Value - Date/Time    Enteric Bacterial Panel - Stool, Per Rectum [[id number]]  (Normal) Collected: 03/08/24 0456    Lab Status: Final result Specimen: Stool from Per Rectum Updated: 03/08/24 1034     Salmonella Not Detected     Campylobacter Not Detected     Shigella/Enteroinvasive E. coli (EIEC) Not Detected     Shiga-like toxin-producing E. coli (STEC) stx1/stx2 Not Detected    Clostridioides difficile Toxin - Stool, Per Rectum [[id number]] Collected: 03/08/24 0456    Lab Status: Final result Specimen: Stool from Per Rectum Updated: 03/08/24 0730    Narrative:      The following orders were created for panel order Clostridioides difficile Toxin - Stool, Per Rectum.  Procedure                               Abnormality         Status                     ---------                               -----------         ------                     Clostridioides difficile...[[id number]]                      Final result                 Please view results for these tests on the individual orders.    Clostridioides difficile Toxin, PCR - Stool, Per Rectum [[id number]] Collected: 03/08/24 0456    Lab Status: Final result Specimen: Stool from Per Rectum Updated: 03/08/24 0730     Toxigenic C. difficile by PCR Negative     027 Toxin Presumptive Negative    Narrative:      The result indicates the absence of toxigenic C. difficile from  stool specimen.     COVID PRE-OP / PRE-PROCEDURE SCREENING ORDER (NO ISOLATION) - Swab, Nasopharynx [[id number]]  (Normal) Collected: 03/07/24 1659    Lab Status: Final result Specimen: Swab from Nasopharynx Updated: 03/07/24 1742    Narrative:      The following orders were created for panel order COVID PRE-OP / PRE-PROCEDURE SCREENING ORDER (NO ISOLATION) - Swab, Nasopharynx.  Procedure                               Abnormality         Status                     ---------                               -----------         ------                     COVID-19, FLU A/B, RSV P...[[id number]]  Normal              Final result                 Please view results for these tests on the individual orders.    COVID-19, FLU A/B, RSV PCR 1 HR TAT - Swab, Nasopharynx [[id number]]  (Normal) Collected: 03/07/24 1659    Lab Status: Final result Specimen: Swab from Nasopharynx Updated: 03/07/24 1742     COVID19 Not Detected     Influenza A PCR Not Detected     Influenza B PCR Not Detected     RSV, PCR Not Detected    Narrative:      Fact sheet for providers: https://www.fda.gov/media/009961/download    Fact sheet for patients: https://www.fda.gov/media/325466/download    Test performed by PCR.            CT Abdomen Pelvis Without Contrast    Result Date: 3/7/2024  Impression:   1. Ground-glass opacity left lung base which may represent underlying viral or atypical pneumonia.  Please correlate with Covid 19 status 2. No definite acute abnormality noted. 3. Additional findings as above     RAHUL MONTERO MD       Electronically Signed and Approved By: RAHUL MONTERO MD on 3/07/2024 at 16:16             XR Abdomen KUB    Result Date: 2/7/2024  Impression:   KUB demonstrating an unremarkable bowel gas pattern.  Infusion pump in the right posterior flank region is in unchanged position.     MANISH LIM MD       Electronically Signed and Approved By: MANISH LIM MD on 2/07/2024 at 17:54                      Results for orders  placed during the hospital encounter of 05/26/23    Adult Transthoracic Echo Complete w/ Color, Spectral and Contrast if necessary per protocol    Interpretation Summary    Left ventricular ejection fraction appears to be 46 - 50%.    Left ventricular wall thickness is consistent with mild concentric hypertrophy.    Left ventricular diastolic function was indeterminate.    There is a TAVR valve present.    Moderate to severe tricuspid valve regurgitation is present.    Estimated right ventricular systolic pressure from tricuspid regurgitation is mildly elevated (35-45 mmHg).    There were no apparent intracardiac masses, vegetations or thrombi.      Labs Pending at Discharge:        Time spent on Discharge including face to face service:  36 minutes    Electronically signed by Hardeep Rose MD, 03/08/24, 4:00 PM EST.

## 2024-03-08 NOTE — DISCHARGE INSTRUCTIONS
Please hold your home Lasix until diarrhea has resolved    Please monitor your blood pressure closely.  If your blood pressure remains low for your normal, continue on midodrine.  Once her blood pressure recovers you can independently stopped his midodrine if you were previously off this medication.    Instead of the original indications and recommendations for dosing of your azithromycin, please take 500 mg for the next 2 days.  In the hospital, you were given 500 mg of azithromycin, on 3/8/2024.  You need a total of 3 days azithromycin at the 500 mg dose.

## 2024-03-08 NOTE — H&P
[facility] HISTORY AND PHYSICAL  Date: 3/7/2024   Patient Name: Isha Llanes  : 1965  MRN: [id number]  Primary Care Physician:  Katia Wright MD  Date of admission: 3/7/2024    Subjective   Subjective     Chief Complaint: Nausea, vomiting, diarrhea    HPI:    Isha Llanes is a 58 y.o. 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  Multiple family members has been having similar symptoms in the last few days likely due to some viral infection.  Patient started noticing having the symptoms this morning had multiple episodes of diarrhea since this morning and has been experience generalized weakness and dehydration.  Denies any fevers, chills, abdominal pain, chest pain, shortness of breath, hematochezia, melena.    In the ED, vital signs temperature 99.4, pulse 118, respiratory 20, blood pressure 114/77 on 2 L of nasal, saturating at 97%.  Labs showed sodium 141, potassium 4.3, bicarb 24.4, anion gap 16.6, creatinine 0.45, rest of the CMP within normal limits, lactic acid 2.1, normal WBC, hemoglobin 15.7, platelets 159, urinalysis is noninfectious, COVID flu RSV negative.  CT abdomen pelvis without contrast showed groundglass opacity left lung base which may represent underlying viral or atypical pneumonia.  No definite acute abnormality noted.  Received IV fluids in the ED.  Patient has been admitted for further evaluation and management of nausea, vomiting, diarrhea, dehydration      Personal History     Past Medical History:   Diagnosis Date    Anesthesia     REPORTS HAS GOT REAL EMOTIONAL AND HAS BECOME ANGRY BEFORE    Anxiety     Aortic stenosis, severe     HAS BIO VALVE REPLACED    Arthritis     Asthma     Back  pain     Blood clotting disorder     Cancer     Cancer associated pain     HODGKINS LYMPHOMA    CHF (congestive heart failure)     Chronic low back pain with sciatica     Chronic pain disorder     COPD (chronic obstructive pulmonary disease)     Coronary artery disease     Diabetes mellitus     TYPE 2    Diabetes mellitus     Dyspnea on effort     GERD (gastroesophageal reflux disease)     H/O lumpectomy     H/O: hysterectomy     Hiatal hernia     History of degenerative disc disease     History of kidney stones     History of pulmonary embolus (PE)     History of transfusion     AS A CHILD NO TRANSFUSION REACTION    History of transfusion     Hodgkin's lymphoma     5/19/23  5 YEARS SINCE LAST CHEMO TX    Hypertension     Immobility     Liver disease     DENIES ANY CURRENT ISSUES    Lupus     Mitral valve prolapse     Nausea vomiting and diarrhea 3/7/2024    Panic disorder     Pelvic pain     Peripheral neuropathy     Personal history of DVT (deep vein thrombosis)     Presence of intrathecal pump     PTSD (post-traumatic stress disorder)     Sacroiliac joint disease     SI (sacroiliac) joint inflammation     Sleep apnea     Venous insufficiency          Past Surgical History:   Procedure Laterality Date    ABDOMINAL SURGERY      BACK SURGERY      SPINAL FUSION     BACK SURGERY      BLADDER REPAIR      CARDIAC CATHETERIZATION N/A 03/06/2019    Procedure: Valvuloplasty;  Surgeon: Wayne Johnson MD;  Location: [facility] CATH INVASIVE LOCATION;  Service: Cardiology    CARDIAC CATHETERIZATION      CARDIAC CATHETERIZATION Left 5/31/2023    Procedure: Cardiac Catheterization/Vascular Study;  Surgeon: Poncho Reid MD;  Location: [facility] CATH INVASIVE LOCATION;  Service: Cardiovascular;  Laterality: Left;    CARDIAC SURGERY      CARDIAC VALVE REPLACEMENT  2021    CHOLECYSTECTOMY      COLONOSCOPY N/A 12/29/2021    Procedure: COLONOSCOPY;  Surgeon: Karine Orlando MD;  Location: [facility] ENDOSCOPY;   Service: Gastroenterology;  Laterality: N/A;  POOR PREP    COLONOSCOPY N/A 04/13/2022    Procedure: COLONOSCOPY WITH POLYPECTOMY;  Surgeon: Karine Orlando MD;  Location: [facility] ENDOSCOPY;  Service: Gastroenterology;  Laterality: N/A;  COLON POLYP, HEMORRHOIDS, POOR PREP    COLONOSCOPY      DIRECT LARYNGOSCOPY, ESOPHAGOSCOPY, BRONCHOSCOPY N/A 5/22/2023    Procedure: DIRECT LARYNGOSCOPY WITH BIOPSIES , ESOPHAGOSCOPY, BRONCHOSCOPY;  Surgeon: Ronnie Mcgarry MD;  Location: [facility] OR OSC;  Service: ENT;  Laterality: N/A;    ENDOSCOPY N/A 12/29/2021    Procedure: ESOPHAGOGASTRODUODENOSCOPY;  Surgeon: Karine Orlando MD;  Location: [facility] ENDOSCOPY;  Service: Gastroenterology;  Laterality: N/A;  ESOPHAGITIS, GASTRITIS, HIATAL HERNIA    ENDOSCOPY      HYSTERECTOMY      LYMPHADENECTOMY      PAIN PUMP INSERTION/REVISION N/A 10/18/2019    Procedure: PAIN PUMP INSERTION [facility] 10-18-19 [place];  Surgeon: Horace Rios MD;  Location: [facility] OR;  Service: Pain Management    PAIN PUMP INSERTION/REVISION N/A 11/23/2020    Procedure: pain pump removal;  Surgeon: Horace Rios MD;  Location: [facility] OR;  Service: Pain Management;  Laterality: N/A;    PEG TUBE INSERTION N/A 7/26/2023    Procedure: PERCUTANEOUS ENDOSCOPIC GASTROSTOMY TUBE INSERTION;  Surgeon: Kody Mercer MD;  Location: [facility] ENDOSCOPY;  Service: General;  Laterality: N/A;  SUCCESSFUL PEG TUBE PLACE PLACEMENT    PORTACATH PLACEMENT      IN LEFT CHEST REPORTS THAT IT IS NOT FUNCTIONING    SKIN BIOPSY      TONSILLECTOMY      TUBAL ABDOMINAL LIGATION      TUMOR REMOVAL           Family History   Problem Relation Age of Onset    Heart failure Mother     Anxiety disorder Mother     Prostate cancer Father     Depression Sister     Bipolar disorder Sister     Pancreatic cancer Sister     ADD / ADHD Brother     Thyroid cancer Maternal Grandmother     Pancreatic cancer Paternal Grandmother     ADD / ADHD Grandson     ADD / ADHD  Granddaughter     ADD / ADHD Nephew     Malig Hyperthermia Neg Hx          Social History     Socioeconomic History    Marital status: Legally    Tobacco Use    Smoking status: Every Day     Current packs/day: 1.50     Average packs/day: 1.5 packs/day for 45.2 years (67.8 ttl pk-yrs)     Types: Cigarettes     Start date: 1979    Smokeless tobacco: Never   Vaping Use    Vaping status: Never Used   Substance and Sexual Activity    Alcohol use: Never    Drug use: Never    Sexual activity: Not Currently         Home Medications:  ALPRAZolam, Fluticasone-Umeclidin-Vilant, HYDROcodone-acetaminophen, O2, OneTouch Delica Plus [id number], albuterol, albuterol sulfate HFA, azithromycin, bacitracin, cetirizine, clopidogrel, furosemide, glucose blood, hydroxychloroquine, midodrine, mupirocin, nicotine polacrilex, ondansetron, pain, pantoprazole, and predniSONE    Allergies:  Allergies   Allergen Reactions    Amoxicillin Shortness Of Breath     Has tolerated Cefazolin, Ceftriaxone, and [facility]fepime [facility]    Ceclor [Cefaclor] Shortness Of Breath     Has tolerated Cefazolin, Ceftriaxone, and Cefepime -[place], RPH      Penicillins Shortness Of Breath     Has tolerated Cefazolin, Ceftriaxone, and Cefepime [facility]    Sulfa Antibiotics Rash    Valium [Diazepam] Mental Status Change     DEPRESSED    Ambien [Zolpidem] Mental Status Change    Aspirin GI Intolerance    Ativan [Lorazepam] Mental Status Change    Benadryl [Diphenhydramine] Anxiety     AND MAKES HER HYPER    Biaxin [Clarithromycin] Unknown - Low Severity    Cephalexin Unknown - Low Severity    Clindamycin Unknown - Low Severity    Compazine [Prochlorperazine Edisylate] Rash    Contrast Dye (Echo Or Unknown Ct/Mr) Other (See Comments)     Caused pain in her arm    Doxycycline Rash    Nsaids GI Intolerance    Phenergan [Promethazine Hcl] GI Intolerance    Promethazine GI Intolerance    Vancomycin Itching       Review of Systems    All other systems reviewed and negative except as mentioned above in the HPI    Objective   Objective     Vitals:   Temp:  [97.4 °F (36.3 °C)] 97.4 °F (36.3 °C)  Heart Rate:  [] 123  Resp:  [20] 20  BP: (104-122)/(64-77) 104/64  Flow (L/min):  [2] 2    Physical Exam    Constitutional: Awake, alert, no acute distress   Eyes: Pupils equal, sclerae anicteric, no conjunctival injection   HENT: NCAT, mucous membranes moist   Respiratory: Clear to auscultation bilaterally, nonlabored respirations    Cardiovascular: Regular, tachycardia, no murmurs, rubs, or gallops, palpable pedal pulses bilaterally   Gastrointestinal: Positive bowel sounds, soft, nontender, nondistended   Musculoskeletal: No bilateral ankle edema, no clubbing or cyanosis to extremities   Neurologic: Oriented x 3, strength symmetric in all extremities, Cranial Nerves grossly intact to confrontation, speech clear   Skin: No rashes     Result Review    Result Review:  I have personally reviewed the results from the time of this admission to 3/7/2024 21:26 EST and agree with these findings:  [x]  Laboratory  []  Microbiology  [x]  Radiology  [x]  EKG/Telemetry   []  Cardiology/Vascular   []  Pathology  []  Old records  []  Other:        Imaging Results (Last 24 Hours)       Procedure Component Value Units Date/Time    CT Abdomen Pelvis Without Contrast [[id number]] Collected: 03/07/24 1616     Updated: 03/07/24 1619    Narrative:      PROCEDURE: CT ABDOMEN PELVIS WO CONTRAST     COMPARISON: [facility], CT, CT CHEST WO CONTRAST DIAGNOSTIC, 9/05/2023, 11:09.    [facility], CT, CT ABDOMEN PELVIS WO CONTRAST, 2/05/2024, 2:25.     INDICATIONS: ABDOMINAL PAIN, NAUSEA/VOMITING/DIARRHEA     TECHNIQUE: CT images were created without intravenous contrast.       PROTOCOL:   Standard imaging protocol performed      RADIATION:   DLP: 332mGy*cm    Automated exposure control was utilized to minimize radiation dose.      FINDINGS:          Lung bases:  There is evidence of old granulomatous disease.  Focal ground-glass opacity   posteriorly left lower lobe.  No free air is noted below the diaphragm.     Organs:  Patient is status post cholecystectomy.  Limited noncontrast imaging liver is is is, left   kidney, spleen, pancreas and adrenal glands are grossly unremarkable.  Spleen is borderline   enlarged.  Punctate nonobstructing calculus noted right kidney     GI tract:  Limited noncontrast imaging of the stomach and the small bowel demonstrates no definite   acute abnormality.  Is is is ileocecal valve is grossly unremarkable.  Colon demonstrates no acute   abnormality.  No suspicious mesenteric adenopathy or fluid collection     Pelvis:  Bladder is incompletely distended and grossly unremarkable in appearance.  Patient appears   to be status post hysterectomy     Retroperitoneum:  Aorta is normal in caliber.  Atherosclerotic changes noted.  There is no   suspicious retroperitoneal adenopathy is     Bones and soft tissues:  Neurostimulator device again noted.  Leads from a prior neurostimulator   device noted.    Advanced degenerative changes noted at the hips.       Impression:         1. Ground-glass opacity left lung base which may represent underlying viral or atypical pneumonia.    Please correlate with Covid 19 status  2. No definite acute abnormality noted.  3. Additional findings as above            RAHUL MONTERO MD         Electronically Signed and Approved By: RAHUL MONTERO MD on 3/07/2024 at 16:16                                    Assessment & Plan   Assessment / Plan     Assessment/Plan:   Nausea, vomiting, diarrhea  Likely gastroenteritis  Dehydration  Elevated lactic acid  COPD, not in exacerbation, chronic 2 L oxygen at home  CAD  Type 2 diabetes mellitus  Hypertension  Squamous cell carcinoma of the supraglottis status post radiation  Non-Hodgkin's lymphoma  PTSD  Aortic stenosis s/p TAVR  Chronic pain on Dilaudid pump  SLE  on hydroxychloroquine, prednisone    Plan  Admit to observation, remote telemetry  Enteric bacterial panel, stool C. difficile  Zofran every 6 hours as needed  Received 2 L IV fluids  Trend lactic acid  Clear liquid diet  Advance diet as tolerated  Brovana, Pulmicort, revefenacin  Bronchodilator protocol  Albuterol every 6 hours as needed  Low-dose sliding scale insulin  Resume other appropriate home medications once reconciled unable to take p.o. medications    DVT prophylaxis:  Medical DVT prophylaxis orders are signed and held.      CODE STATUS:    Level Of Support Discussed With: Patient  Code Status (Patient has no pulse and is not breathing): CPR (Attempt to Resuscitate)  Medical Interventions (Patient has pulse or is breathing): Full Support      Admission Status:  I believe this patient meets observation status.    Part of this note may be an electronic transcription/translation of spoken language to printed text using the Dragon Dictation System    Rach Saeed MD

## 2024-03-11 ENCOUNTER — TRANSITIONAL CARE MANAGEMENT TELEPHONE ENCOUNTER (OUTPATIENT)
Dept: CALL CENTER | Facility: HOSPITAL | Age: 59
End: 2024-03-11
Payer: COMMERCIAL

## 2024-03-11 RX ORDER — HYDROXYCHLOROQUINE SULFATE 200 MG/1
200 TABLET, FILM COATED ORAL DAILY
Qty: 90 TABLET | Refills: 0 | Status: SHIPPED | OUTPATIENT
Start: 2024-03-11 | End: 2024-06-09

## 2024-03-11 NOTE — OUTREACH NOTE
Call Center TCM Note      Flowsheet Row Responses   [facility] patient discharged from? Go   Does the patient have one of the following disease processes/diagnoses(primary or secondary)? Other   TCM attempt successful? No   Unsuccessful attempts Attempt 2            Emily Birmingham RN    3/11/2024, 13:13 EDT

## 2024-03-11 NOTE — OUTREACH NOTE
Call Center TCM Note      Flowsheet Row Responses   [facility] patient discharged from? Go   Does the patient have one of the following disease processes/diagnoses(primary or secondary)? Other   TCM attempt successful? No   Unsuccessful attempts Attempt 1            Emily Birmingham RN    3/11/2024, 08:53 EDT

## 2024-03-12 ENCOUNTER — TRANSITIONAL CARE MANAGEMENT TELEPHONE ENCOUNTER (OUTPATIENT)
Dept: CALL CENTER | Facility: HOSPITAL | Age: 59
End: 2024-03-12
Payer: COMMERCIAL

## 2024-03-12 DIAGNOSIS — F41.9 ANXIETY: ICD-10-CM

## 2024-03-12 RX ORDER — ALPRAZOLAM 0.5 MG/1
0.5 TABLET ORAL 2 TIMES DAILY PRN
Qty: 60 TABLET | Refills: 0 | Status: SHIPPED | OUTPATIENT
Start: 2024-03-12 | End: 2024-05-11

## 2024-03-12 NOTE — TELEPHONE ENCOUNTER
Caller:    RENÉE ABARCA     Relationship:   SELF     Best call back number:   [phone]     Who are you requesting to speak with (clinical staff, provider,  specific staff member): CLINICAL     What was the call regarding: PATIENT IS STATING THAT HER CELLULITIS IS GETTING BAD ON HER LEG. WOULD LIKE TO KNOW IF DR ESTEVES WOULD CALL HER IN MEDICATION FOR IT, SO SHE DOESN'T END UP IN HOSPITAL AGAIN.  PLEASE CALL AND ADVISE.   
Has this been addressed?  
Consult...

## 2024-03-12 NOTE — OUTREACH NOTE
Call Center TCM Note      Flowsheet Row Responses   [facility] patient discharged from? Go   Does the patient have one of the following disease processes/diagnoses(primary or secondary)? Other   TCM attempt successful? Yes   Call start time 1411   Call end time 1413   Discharge diagnosis Nausea, vomiting, and diarrhea   Does the patient have all medications ordered at discharge? N/A   Is the patient taking all medications as directed (includes completed medication regime)? Yes   Medication comments pt reports she has zitrhomax, taking as ordered   Comments Confirmed appt on 3/19/24@200pm   Does the patient have an appointment with their PCP within 7-14 days of discharge? Yes   [facility]   Has home health visited the patient within 72 hours of discharge? Unsure   DME comments Pt received [facility]   TCM call completed? Yes   Wrap up additional comments Pt told this RN at onset of call she just had a minute to talk,  only about to confirm she had ATB at home, received commode and confirmed appt before she ended call.   Call end time 1413            Dionne Mccann RN    3/12/2024, 14:16 EDT

## 2024-03-19 ENCOUNTER — OFFICE VISIT (OUTPATIENT)
Dept: FAMILY MEDICINE CLINIC | Facility: CLINIC | Age: 59
End: 2024-03-19
Payer: COMMERCIAL

## 2024-03-19 VITALS
WEIGHT: 126 LBS | OXYGEN SATURATION: 97 % | HEART RATE: 93 BPM | BODY MASS INDEX: 20.25 KG/M2 | HEIGHT: 66 IN | TEMPERATURE: 97.3 F

## 2024-03-19 DIAGNOSIS — Z09 HOSPITAL DISCHARGE FOLLOW-UP: Primary | ICD-10-CM

## 2024-03-19 DIAGNOSIS — E11.65 TYPE 2 DIABETES MELLITUS WITH HYPERGLYCEMIA, WITHOUT LONG-TERM CURRENT USE OF INSULIN: ICD-10-CM

## 2024-03-19 DIAGNOSIS — J18.9 COMMUNITY ACQUIRED PNEUMONIA, UNSPECIFIED LATERALITY: ICD-10-CM

## 2024-03-19 DIAGNOSIS — K52.9 CHRONIC DIARRHEA: ICD-10-CM

## 2024-03-19 DIAGNOSIS — M32.9 LUPUS: ICD-10-CM

## 2024-03-19 LAB
ALBUMIN SERPL-MCNC: 3.9 G/DL (ref 3.5–5.2)
ALBUMIN/GLOB SERPL: 1.3 G/DL
ALP SERPL-CCNC: 63 U/L (ref 39–117)
ALT SERPL W P-5'-P-CCNC: 9 U/L (ref 1–33)
ANION GAP SERPL CALCULATED.3IONS-SCNC: 12 MMOL/L (ref 5–15)
AST SERPL-CCNC: 17 U/L (ref 1–32)
BASOPHILS # BLD AUTO: 0.03 10*3/MM3 (ref 0–0.2)
BASOPHILS NFR BLD AUTO: 0.5 % (ref 0–1.5)
BILIRUB SERPL-MCNC: <0.2 MG/DL (ref 0–1.2)
BUN SERPL-MCNC: 12 MG/DL (ref 6–20)
BUN/CREAT SERPL: 22.2 (ref 7–25)
CALCIUM SPEC-SCNC: 9.3 MG/DL (ref 8.6–10.5)
CHLORIDE SERPL-SCNC: 102 MMOL/L (ref 98–107)
CO2 SERPL-SCNC: 25 MMOL/L (ref 22–29)
CREAT SERPL-MCNC: 0.54 MG/DL (ref 0.57–1)
DEPRECATED RDW RBC AUTO: 43.9 FL (ref 37–54)
EGFRCR SERPLBLD CKD-EPI 2021: 106.9 ML/MIN/1.73
EOSINOPHIL # BLD AUTO: 0.07 10*3/MM3 (ref 0–0.4)
EOSINOPHIL NFR BLD AUTO: 1.1 % (ref 0.3–6.2)
ERYTHROCYTE [DISTWIDTH] IN BLOOD BY AUTOMATED COUNT: 13.8 % (ref 12.3–15.4)
GLOBULIN UR ELPH-MCNC: 3 GM/DL
GLUCOSE SERPL-MCNC: 192 MG/DL (ref 65–99)
HCT VFR BLD AUTO: 39 % (ref 34–46.6)
HGB BLD-MCNC: 13 G/DL (ref 12–15.9)
IMM GRANULOCYTES # BLD AUTO: 0.08 10*3/MM3 (ref 0–0.05)
IMM GRANULOCYTES NFR BLD AUTO: 1.3 % (ref 0–0.5)
LYMPHOCYTES # BLD AUTO: 0.53 10*3/MM3 (ref 0.7–3.1)
LYMPHOCYTES NFR BLD AUTO: 8.3 % (ref 19.6–45.3)
MCH RBC QN AUTO: 29.1 PG (ref 26.6–33)
MCHC RBC AUTO-ENTMCNC: 33.3 G/DL (ref 31.5–35.7)
MCV RBC AUTO: 87.2 FL (ref 79–97)
MONOCYTES # BLD AUTO: 0.27 10*3/MM3 (ref 0.1–0.9)
MONOCYTES NFR BLD AUTO: 4.3 % (ref 5–12)
NEUTROPHILS NFR BLD AUTO: 5.37 10*3/MM3 (ref 1.7–7)
NEUTROPHILS NFR BLD AUTO: 84.5 % (ref 42.7–76)
NRBC BLD AUTO-RTO: 0 /100 WBC (ref 0–0.2)
PLAT MORPH BLD: NORMAL
PLATELET # BLD AUTO: 190 10*3/MM3 (ref 140–450)
PMV BLD AUTO: 11 FL (ref 6–12)
POTASSIUM SERPL-SCNC: 4.8 MMOL/L (ref 3.5–5.2)
PROT SERPL-MCNC: 6.9 G/DL (ref 6–8.5)
RBC # BLD AUTO: 4.47 10*6/MM3 (ref 3.77–5.28)
RBC MORPH BLD: NORMAL
SODIUM SERPL-SCNC: 139 MMOL/L (ref 136–145)
WBC MORPH BLD: NORMAL
WBC NRBC COR # BLD AUTO: 6.35 10*3/MM3 (ref 3.4–10.8)

## 2024-03-19 PROCEDURE — 85007 BL SMEAR W/DIFF WBC COUNT: CPT | Performed by: STUDENT IN AN ORGANIZED HEALTH CARE EDUCATION/TRAINING PROGRAM

## 2024-03-19 PROCEDURE — 80053 COMPREHEN METABOLIC PANEL: CPT | Performed by: STUDENT IN AN ORGANIZED HEALTH CARE EDUCATION/TRAINING PROGRAM

## 2024-03-19 PROCEDURE — 85025 COMPLETE CBC W/AUTO DIFF WBC: CPT | Performed by: STUDENT IN AN ORGANIZED HEALTH CARE EDUCATION/TRAINING PROGRAM

## 2024-03-19 RX ORDER — PREDNISONE 10 MG/1
10 TABLET ORAL DAILY
Qty: 30 TABLET | Refills: 3 | Status: SHIPPED | OUTPATIENT
Start: 2024-03-19

## 2024-03-19 RX ORDER — LEVOFLOXACIN 750 MG/1
750 TABLET, FILM COATED ORAL DAILY
Qty: 5 TABLET | Refills: 0 | Status: SHIPPED | OUTPATIENT
Start: 2024-03-19 | End: 2024-03-24

## 2024-03-19 NOTE — PROGRESS NOTES
"Chief Complaint  Follow-up    Subjective      History of Present Illness    Isha Llanes is a 58 y.o. 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 She was admitted for none night. CT abdomen pelvis without contrast showed groundglass opacity left lung base which may represent underlying viral or atypical pneumonia. She had a appointment with rheumatology but was unable to go due to transportation. Pt states she feels bad today, she feels weak.  Her blood glucose was checked in the office today it was around 273.  She completed a z pack.  She has  a history of non-compliance, she states she does not take all her medications as prescribed.   She was admitted for diarrhea and nausea and vomiting, she states she has chronic diarrhea. Denies any chest pain or nausea currently.       Objective   Vital Signs:   Vitals:    03/19/24 1403   Pulse: 93   Temp: 97.3 °F (36.3 °C)   SpO2: 97%   Weight: 57.2 kg (126 lb)   Height: 167.6 cm (66\")     Body mass index is 20.34 kg/m².    Wt Readings from Last 3 Encounters:   03/19/24 57.2 kg (126 lb)   03/07/24 57.2 kg (126 lb 1.7 oz)   03/05/24 60.3 kg (133 lb)     BP Readings from Last 3 Encounters:   03/08/24 103/70   02/19/24 90/60   02/09/24 109/48       Health Maintenance   Topic Date Due    DIABETIC EYE EXAM  Never done    ZOSTER VACCINE (1 of 2) Never done    ANNUAL PHYSICAL  Never done    DIABETIC FOOT EXAM  Never done    PAP SMEAR  03/29/2017    LIPID PANEL  01/12/2024    HEMOGLOBIN A1C  05/29/2024    INFLUENZA VACCINE  03/31/2024 (Originally 8/1/2023)    COVID-19 Vaccine (1) 05/10/2024 (Originally [birth year]4/1970)    Hepatitis B (1 of 3 - 19+ 3-dose series) 02/19/2025 " (Originally 12/4/1984)    Pneumococcal Vaccine 0-64 (1 of 2 - PCV) 02/19/2025 (Originally 12/4/1971)    MAMMOGRAM  02/19/2025 (Originally 1/8/2021)    TDAP/TD VACCINES (1 - Tdap) 02/19/2025 (Originally 12/4/1984)    LUNG CANCER SCREENING  09/05/2024    URINE MICROALBUMIN  02/19/2025    COLORECTAL CANCER SCREENING  04/13/2032    HEPATITIS C SCREENING  Completed       Physical Exam   Constitutional:       General: She is not in acute distress.appears chronically ill.      Appearance: Normal appearance. She is not toxic-appearing.   HENT:      Head: Normocephalic and atraumatic.   Neck:      Comments: Development of submental lymphedema continues to be present  Pulmonary:      Effort: Pulmonary effort is normal. No respiratory distress.   Neurological:      General: No focal deficit present.      Mental Status: She is alert and oriented to person, place, and time.      Cranial Nerves: No cranial nerve deficit.   Psychiatric:         Mood and Affect: Mood normal.         Behavior: Behavior normal.         Judgment: Judgment normal.   Result Review :     The following data was reviewed by: Katia Wright MD on 03/19/2024:      Procedures          Diagnoses and all orders for this visit:    1. Hospital discharge follow-up (Primary)    2. Lupus    3. Community acquired pneumonia, unspecified laterality    4. Type 2 diabetes mellitus with hyperglycemia, without long-term current use of insulin  -     Comprehensive Metabolic Panel  -     CBC & Differential  -     Hemoglobin A1c; Future    5. Chronic diarrhea  -     Ambulatory Referral to Gastroenterology    Other orders  -     levoFLOXacin (Levaquin) 750 MG tablet; Take 1 tablet by mouth Daily for 5 days.  Dispense: 5 tablet; Refill: 0      31  minutes were spent caring for Isha on this date of service. This time spent by me includes preparing for the visit, reviewing tests, obtaining/reviewing separately obtained history, performing medically appropriate  exam/evaluation, counseling/educating the patient/family/caregiver, ordering medications/tests/procedures, referring/communicating with other health care professionals, documenting information in the medical record, independently interpreting results and communicating that with the patient/family/caregiver and/or care coordination.     BMI is within normal parameters. No other follow-up for BMI required.     Pt to complete a course of levaquin for possible pneumonia.     FOLLOW UP  No follow-ups on file.  Patient was given instructions and counseling regarding her condition or for health maintenance advice. Please see specific information pulled into the AVS if appropriate.       Katia Wright MD  03/19/24  14:53 EDT    CURRENT & DISCONTINUED MEDICATIONS  Current Outpatient Medications   Medication Instructions    albuterol (PROVENTIL) 2.5 mg, Nebulization, Every 4 Hours PRN    albuterol sulfate  (90 Base) MCG/ACT inhaler 2 puffs, Inhalation, Every 4 Hours PRN    ALPRAZolam (XANAX) 0.5 mg, Oral, 2 Times Daily PRN    bacitracin 0.9 g, Topical, 2 Times Daily    cetirizine (zyrTEC) 10 MG tablet 1 tablet, Oral, Daily    clopidogrel (PLAVIX) 75 mg, Oral, Daily    glucose blood test strip Use as instructed    HYDROcodone-acetaminophen (NORCO) 5-325 MG per tablet 1 tablet, Oral, Daily PRN    hydroxychloroquine (PLAQUENIL) 200 mg, Oral, Daily    Lancets (OneTouch Delica Plus Uugend38C) misc     levoFLOXacin (LEVAQUIN) 750 mg, Oral, Daily    midodrine (PROAMATINE) 10 mg, Oral, 3 Times Daily Before Meals    mupirocin (BACTROBAN) 2 % ointment 1 application , Topical, 3 Times Daily    nicotine polacrilex (COMMIT) 2 mg, Mouth/Throat, As Needed    O2 (OXYGEN) Inhalation, Once    ondansetron (ZOFRAN) 4 mg, Oral, Every 12 Hours PRN    pain patient supplied pump Intrathecal, Continuous, <BR>Type of Medication: Hydromorphone 15 mg/ml  and Bupivacaine 0.5 mg/ml<BR>Provider:Dr. Boudreaux<BR>Provider number: [phone]<BR>Basal  Dose: Hydromorphone 7.518 mg/day Bupivacaine 0.32249 mg/day<BR>( MAX of Hydromorphone 9.940 mg/ day; Bupivacaine 0.23012 mg /day)<BR>Bolus Dose:Hydromorphone 0.500 mg, Bupivacaine 0.24520 mg<BR>Lockout 3 hours. 1 Bolus per every 3 hours max of 5 bolus per day<BR>Next refill/ Alarm Date- 4/24/24<BR>Pump manufacture:[employer]<BR><BR><BR>     predniSONE (DELTASONE) 10 mg, Oral, Daily       There are no discontinued medications.

## 2024-03-20 ENCOUNTER — TELEPHONE (OUTPATIENT)
Dept: FAMILY MEDICINE CLINIC | Facility: CLINIC | Age: 59
End: 2024-03-20

## 2024-03-20 ENCOUNTER — READMISSION MANAGEMENT (OUTPATIENT)
Dept: CALL CENTER | Facility: HOSPITAL | Age: 59
End: 2024-03-20
Payer: COMMERCIAL

## 2024-03-20 NOTE — TELEPHONE ENCOUNTER
Caller: Isha Llanes    Relationship: Self    Best call back number: [phone]    What medication are you requesting: SOMETHING FOR SICK STOMACH     What are your current symptoms: SICK AT STOMACH, DAUGHTER HAS BEEN DIAGNOSED WITH A VIRUS AND PATIENT HAS DRANK AFTER HER AND SMOKED AFTER HER AND IS NOW HAS A SICK STOMACH.

## 2024-03-20 NOTE — OUTREACH NOTE
Medical Week 2 Survey      Flowsheet Row Responses   [facility] facility patient discharged from? Go   Does the patient have one of the following disease processes/diagnoses(primary or secondary)? Other   Week 2 attempt successful? No   Unsuccessful attempts Attempt 1            Ortiz SANCHEZ - Registered Nurse

## 2024-03-21 DIAGNOSIS — R11.0 NAUSEA: Primary | ICD-10-CM

## 2024-03-21 RX ORDER — ONDANSETRON 4 MG/1
4 TABLET, ORALLY DISINTEGRATING ORAL EVERY 12 HOURS PRN
Qty: 14 TABLET | Refills: 0 | Status: SHIPPED | OUTPATIENT
Start: 2024-03-21 | End: 2024-03-28

## 2024-03-25 ENCOUNTER — READMISSION MANAGEMENT (OUTPATIENT)
Dept: CALL CENTER | Facility: HOSPITAL | Age: 59
End: 2024-03-25
Payer: COMMERCIAL

## 2024-03-25 NOTE — OUTREACH NOTE
Medical Week 2 Survey      Flowsheet Row Responses   [facility] facility patient discharged from? Go   Does the patient have one of the following disease processes/diagnoses(primary or secondary)? Other   Week 2 attempt successful? No   Unsuccessful attempts Attempt 2            KELLY MOLINA - Registered Nurse

## 2024-03-26 ENCOUNTER — TELEPHONE (OUTPATIENT)
Dept: FAMILY MEDICINE CLINIC | Facility: CLINIC | Age: 59
End: 2024-03-26

## 2024-03-26 NOTE — TELEPHONE ENCOUNTER
Hub to relay:  Called [employer] and got the fax number, sent message to Dr. Wright about making a DME for pt so I can fax the order to them she is not in the office today 3/26/24 so this should be taken care of tomorrow 3/26/24.

## 2024-03-26 NOTE — TELEPHONE ENCOUNTER
Caller: Isha Llanes    Relationship: Self    Best call back number: N/A    What is the best time to reach you: N/A    Who are you requesting to speak with (clinical staff, provider,  specific staff member): CLINICAL    Do you know the name of the person who called: PATIENT    What was the call regarding: PATIENT IS NEEDING MEDICAL SUPPLIES FOR HER LEGS FROM [facility]. SHE NEEDS THESE DELIVERED TO HER HOME SINCE HOME HEALTH IS NOT COMING TO HER HOUSE ANYMORE.   ADDRESS [address] [place], [place] [address]    PLEASE CONTACT [facility] AND SEND PRESCRIPTION OR ORDER FOR THESE SUPPLIES ASAP. PATIENT NEEDS: WRAP GAUZE ROLL GAUZE THAT WRAPS AROUND, 4X4 GAUZE. SPRAY WASH,. TAPE AND ACE BANDAGES FOR BOTH LEGS. CHANGES ACE BANDAGES AT LEAST ONCE A DAY THE REGULAR ACE BANDAGES. DEPENDS, SHE IS STILL HAVING ACCIDENTS AND CAN'T AFFORD THEM RIGHT NOW. ANTIBIOTIC OINTMENT SHE IS OUT OF THIS COMPLETELY. AREAS ON HER HEAD ARE STILL BROKEN OUT. NOT CLEARING UP.    Is it okay if the provider responds through MyChart: N/A

## 2024-03-27 DIAGNOSIS — I87.2 VENOUS STASIS ULCER OF LEFT CALF WITH FAT LAYER EXPOSED WITHOUT VARICOSE VEINS: Primary | ICD-10-CM

## 2024-03-27 DIAGNOSIS — L97.222 VENOUS STASIS ULCER OF LEFT CALF WITH FAT LAYER EXPOSED WITHOUT VARICOSE VEINS: Primary | ICD-10-CM

## 2024-04-02 DIAGNOSIS — J44.1 COPD WITH EXACERBATION: Primary | ICD-10-CM

## 2024-04-02 DIAGNOSIS — R11.0 NAUSEA: Primary | ICD-10-CM

## 2024-04-02 RX ORDER — ONDANSETRON 4 MG/1
4 TABLET, FILM COATED ORAL EVERY 8 HOURS PRN
Qty: 90 TABLET | Refills: 0 | Status: SHIPPED | OUTPATIENT
Start: 2024-04-02 | End: 2024-04-08

## 2024-04-05 ENCOUNTER — TELEPHONE (OUTPATIENT)
Dept: FAMILY MEDICINE CLINIC | Facility: CLINIC | Age: 59
End: 2024-04-05
Payer: COMMERCIAL

## 2024-04-05 NOTE — TELEPHONE ENCOUNTER
[facility] called regarding an order they received for wound care supplies. They stated they need the order to say exactly what supplies are needed for them to send to the patient. The order can be refaxed with the information to fax number [phone].

## 2024-04-08 ENCOUNTER — OFFICE VISIT (OUTPATIENT)
Dept: GASTROENTEROLOGY | Facility: CLINIC | Age: 59
End: 2024-04-08
Payer: COMMERCIAL

## 2024-04-08 ENCOUNTER — TELEPHONE (OUTPATIENT)
Dept: GASTROENTEROLOGY | Facility: CLINIC | Age: 59
End: 2024-04-08
Payer: COMMERCIAL

## 2024-04-08 VITALS
HEIGHT: 66 IN | HEART RATE: 95 BPM | WEIGHT: 101 LBS | BODY MASS INDEX: 16.23 KG/M2 | SYSTOLIC BLOOD PRESSURE: 116 MMHG | DIASTOLIC BLOOD PRESSURE: 71 MMHG

## 2024-04-08 DIAGNOSIS — R63.4 WEIGHT LOSS, ABNORMAL: Primary | ICD-10-CM

## 2024-04-08 DIAGNOSIS — R11.2 NAUSEA AND VOMITING, UNSPECIFIED VOMITING TYPE: ICD-10-CM

## 2024-04-08 DIAGNOSIS — R10.13 EPIGASTRIC PAIN: ICD-10-CM

## 2024-04-08 DIAGNOSIS — Z85.21 HISTORY OF LARYNGEAL CANCER: ICD-10-CM

## 2024-04-08 DIAGNOSIS — R93.3 IMAGING OF GASTROINTESTINAL TRACT ABNORMAL: ICD-10-CM

## 2024-04-08 DIAGNOSIS — R63.0 ANOREXIA: ICD-10-CM

## 2024-04-08 PROCEDURE — 99214 OFFICE O/P EST MOD 30 MIN: CPT | Performed by: NURSE PRACTITIONER

## 2024-04-08 PROCEDURE — 1160F RVW MEDS BY RX/DR IN RCRD: CPT | Performed by: NURSE PRACTITIONER

## 2024-04-08 PROCEDURE — 1159F MED LIST DOCD IN RCRD: CPT | Performed by: NURSE PRACTITIONER

## 2024-04-08 RX ORDER — ONDANSETRON 8 MG/1
8 TABLET, ORALLY DISINTEGRATING ORAL EVERY 8 HOURS PRN
Qty: 30 TABLET | Refills: 1 | Status: SHIPPED | OUTPATIENT
Start: 2024-04-08

## 2024-04-08 NOTE — PROGRESS NOTES
Chief Complaint     Diarrhea and Nausea    History of Present Illness     Isha Llanes is a 58 y.o. female who presents to [facility] for follow-up of constipation, fecal incontinence, abdominal pain and fatty liver.     Her last OV was in March, 2023.  Since that time she was diagnosed with laryngeal cancer.  Treated with radiation in July.  Reports that she has sporadic nausea and vomiting that has been present since radiation.  She had a feeding tube placed during treatment per Dr. Mercer.  This was removed in his office on 10/6/23.  She has tried Zofran 4 mg but does not feel that it helps much with nasuea.  She's tried TUMS and lambert seltzer without improvement.     Reports that she does not have a good appetite.   She's lost about 40 lbs since diagnosis last year.  25 lb weight loss in the past one month.      Reports epigastric discomfort that she describes as constant.  Denies improving and worsening factors.        Admits alternating between constipation and diarrhea.         History      Past Medical History:   Diagnosis Date    Anesthesia     REPORTS HAS GOT REAL EMOTIONAL AND HAS BECOME ANGRY BEFORE    Anxiety     Aortic stenosis, severe     HAS BIO VALVE REPLACED    Arthritis     Asthma     Back pain     Blood clotting disorder     Cancer     Cancer associated pain     HODGKINS LYMPHOMA    CHF (congestive heart failure)     Chronic low back pain with sciatica     Chronic pain disorder     COPD (chronic obstructive pulmonary disease)     Coronary artery disease     Diabetes mellitus     TYPE 2    Diabetes mellitus     Dyspnea on effort     GERD (gastroesophageal reflux disease)     H/O lumpectomy     H/O: hysterectomy     Hiatal hernia     History of degenerative disc disease     History of kidney stones     History of pulmonary embolus (PE)     History of transfusion     AS A CHILD NO TRANSFUSION REACTION    History of transfusion     Hodgkin's lymphoma      5/19/23  5 YEARS SINCE LAST CHEMO TX    Hypertension     Immobility     Liver disease     DENIES ANY CURRENT ISSUES    Lupus     Mitral valve prolapse     Nausea vomiting and diarrhea 3/7/2024    Panic disorder     Pelvic pain     Peripheral neuropathy     Personal history of DVT (deep vein thrombosis)     Presence of intrathecal pump     PTSD (post-traumatic stress disorder)     Sacroiliac joint disease     SI (sacroiliac) joint inflammation     Sleep apnea     Venous insufficiency      Past Surgical History:   Procedure Laterality Date    ABDOMINAL SURGERY      BACK SURGERY      SPINAL FUSION     BACK SURGERY      BLADDER REPAIR      CARDIAC CATHETERIZATION N/A 03/06/2019    Procedure: Valvuloplasty;  Surgeon: Wayne Johnson MD;  Location: [facility] INVASIVE LOCATION;  Service: Cardiology    CARDIAC CATHETERIZATION      CARDIAC CATHETERIZATION Left 5/31/2023    Procedure: Cardiac Catheterization/Vascular Study;  Surgeon: Poncho Reid MD;  Location: [facility] CATH INVASIVE LOCATION;  Service: Cardiovascular;  Laterality: Left;    CARDIAC SURGERY      CARDIAC VALVE REPLACEMENT  2021    CHOLECYSTECTOMY      COLONOSCOPY N/A 12/29/2021    Procedure: COLONOSCOPY;  Surgeon: Karine Orlando MD;  Location: [facility] ENDOSCOPY;  Service: Gastroenterology;  Laterality: N/A;  POOR PREP    COLONOSCOPY N/A 04/13/2022    Procedure: COLONOSCOPY WITH POLYPECTOMY;  Surgeon: Karine Orlando MD;  Location: [facility] ENDOSCOPY;  Service: Gastroenterology;  Laterality: N/A;  COLON POLYP, HEMORRHOIDS, POOR PREP    COLONOSCOPY      DIRECT LARYNGOSCOPY, ESOPHAGOSCOPY, BRONCHOSCOPY N/A 5/22/2023    Procedure: DIRECT LARYNGOSCOPY WITH BIOPSIES , ESOPHAGOSCOPY, BRONCHOSCOPY;  Surgeon: Ronnie Mcgarry MD;  Location: [facility] OR [facility];  Service: ENT;  Laterality: N/A;    ENDOSCOPY N/A 12/29/2021    Procedure: ESOPHAGOGASTRODUODENOSCOPY;  Surgeon: Karine Orlando MD;  Location: [facility] ENDOSCOPY;  Service:  Gastroenterology;  Laterality: N/A;  ESOPHAGITIS, GASTRITIS, HIATAL HERNIA    ENDOSCOPY      HYSTERECTOMY      LYMPHADENECTOMY      PAIN PUMP INSERTION/REVISION N/A 10/18/2019    Procedure: PAIN PUMP INSERTION [facility] 10[facility] [place];  Surgeon: Horace Rios MD;  Location: [facility] OR;  Service: Pain Management    PAIN PUMP INSERTION/REVISION N/A 11/23/2020    Procedure: pain pump removal;  Surgeon: Horace Rios MD;  Location: [facility] OR;  Service: Pain Management;  Laterality: N/A;    PEG TUBE INSERTION N/A 7/26/2023    Procedure: PERCUTANEOUS ENDOSCOPIC GASTROSTOMY TUBE INSERTION;  Surgeon: Kody Mercer MD;  Location: [facility] ENDOSCOPY;  Service: General;  Laterality: N/A;  SUCCESSFUL PEG TUBE PLACE PLACEMENT    PORTACATH PLACEMENT      IN LEFT CHEST REPORTS THAT IT IS NOT FUNCTIONING    SKIN BIOPSY      TONSILLECTOMY      TUBAL ABDOMINAL LIGATION      TUMOR REMOVAL       Family History   Problem Relation Age of Onset    Heart failure Mother     Anxiety disorder Mother     Prostate cancer Father     Depression Sister     Bipolar disorder Sister     Pancreatic cancer Sister     ADD / ADHD Brother     Thyroid cancer Maternal Grandmother     Pancreatic cancer Paternal Grandmother     ADD / ADHD Grandson     ADD / ADHD Granddaughter     ADD / ADHD Nephew     Malig Hyperthermia Neg Hx         Current Medications       Current Outpatient Medications:     albuterol (PROVENTIL) 2.5 MG/0.5ML nebulizer solution, Take 2.5 mg by nebulization Every 4 (Four) Hours As Needed for Wheezing or Shortness of Air., Disp: 20 mL, Rfl: 0    albuterol sulfate  (90 Base) MCG/ACT inhaler, Inhale 2 puffs Every 4 (Four) Hours As Needed for Wheezing., Disp: 18 g, Rfl: 2    ALPRAZolam (Xanax) 0.5 MG tablet, Take 1 tablet by mouth 2 (Two) Times a Day As Needed for Anxiety for up to 60 days., Disp: 60 tablet, Rfl: 0    bacitracin 500 UNIT/GM ointment, Apply 1 Application topically to the appropriate area as directed  2 (Two) Times a Day., Disp: 28 g, Rfl: 5    cetirizine (zyrTEC) 10 MG tablet, Take 1 tablet by mouth Daily., Disp: , Rfl:     clopidogrel (PLAVIX) 75 MG tablet, Take 1 tablet by mouth Daily., Disp: 90 tablet, Rfl: 2    glucose blood test strip, Use as instructed, Disp: 100 each, Rfl: 12    HYDROcodone-acetaminophen (NORCO) 5-325 MG per tablet, Take 1 tablet by mouth Daily As Needed for Moderate Pain., Disp: , Rfl:     hydroxychloroquine (PLAQUENIL) 200 MG tablet, Take 1 tablet by mouth Daily for 90 days. Indications: Rheumatoid Arthritis, Disp: 90 tablet, Rfl: 0    Lancets (OneTouch Delica Plus [id number]) misc, , Disp: , Rfl:     mupirocin (BACTROBAN) 2 % ointment, Apply 1 application  topically to the appropriate area as directed 3 (Three) Times a Day., Disp: 30 g, Rfl: 2    nicotine polacrilex (COMMIT) 2 MG lozenge, Dissolve 1 lozenge in the mouth As Needed for Smoking Cessation., Disp: 81 each, Rfl: 0    O2 (OXYGEN), Inhale 1 (One) Time., Disp: , Rfl:     pain patient supplied pump, by Intrathecal route Continuous.  Type of Medication: Hydromorphone 15 mg/ml  and Bupivacaine 0.5 mg/ml Provider:Dr. Boudreaux Provider number: [phone] Basal Dose: Hydromorphone 7.518 mg/day Bupivacaine 0.73090 mg/day ( MAX of Hydromorphone 9.940 mg/ day; Bupivacaine 0.38016 mg /day) Bolus Dose:Hydromorphone 0.500 mg, Bupivacaine 0.64685 mg Lockout 3 hours. 1 Bolus per every 3 hours max of 5 bolus per day Next refill/ Alarm Date- 4/24/24 Pump manufacture:[employer], Disp: , Rfl:     predniSONE (DELTASONE) 10 MG tablet, Take 1 tablet by mouth Daily., Disp: 30 tablet, Rfl: 3    midodrine (PROAMATINE) 10 MG tablet, Take 1 tablet by mouth 3 (Three) Times a Day Before Meals for 30 days., Disp: 90 tablet, Rfl: 0    ondansetron ODT (ZOFRAN-ODT) 8 MG disintegrating tablet, Place 1 tablet on the tongue Every 8 (Eight) Hours As Needed for Nausea or Vomiting., Disp: 30 tablet, Rfl: 1     Allergies     Allergies   Allergen Reactions    Amoxicillin  "Shortness Of Breath     Has tolerated Cefazolin, Ceftriaxone, and Cefepime -[facility]    Ceclor [Cefaclor] Shortness Of Breath     Has tolerated Cefazolin, Ceftriaxone, and Cefepime -[facility]      Penicillins Shortness Of Breath     Has tolerated Cefazolin, Ceftriaxone, and Cefepime -[facility]    Sulfa Antibiotics Rash    Valium [Diazepam] Mental Status Change     DEPRESSED    Ambien [Zolpidem] Mental Status Change    Aspirin GI Intolerance    Ativan [Lorazepam] Mental Status Change    Benadryl [Diphenhydramine] Anxiety     AND MAKES HER HYPER    Biaxin [Clarithromycin] Unknown - Low Severity    Cephalexin Unknown - Low Severity    Clindamycin Unknown - Low Severity    Compazine [Prochlorperazine Edisylate] Rash    Contrast Dye (Echo Or Unknown Ct/Mr) Other (See Comments)     Caused pain in her arm    Doxycycline Rash    Nsaids GI Intolerance    Phenergan [Promethazine Hcl] GI Intolerance    Promethazine GI Intolerance    Vancomycin Itching       Social History       Social History     Social History Narrative    ** Merged History Encounter **              Objective       /71 (BP Location: Left arm, Patient Position: Sitting, Cuff Size: Adult)   Pulse 95   Ht 167.6 cm (66\")   Wt 45.8 kg (101 lb)   BMI 16.30 kg/m²       Physical Exam    Results       Result Review :    The following data was reviewed by: LUZ Rene on 04/08/2024:    CBC w/diff          3/7/2024    13:31 3/8/2024    09:13 3/19/2024    15:08   CBC w/Diff   WBC 7.09  3.04  6.35    RBC 5.60  4.26  4.47    Hemoglobin 15.7  11.8  13.0    Hematocrit 51.2  38.8  39.0    MCV 91.4  91.1  87.2    MCH 28.0  27.7  29.1    MCHC 30.7  30.4  33.3    RDW 14.9  14.9  13.8    Platelets 159  125  190    Neutrophil Rel % 89.0  77.0  84.5    Immature Granulocyte Rel % 0.4  0.3  1.3    Lymphocyte Rel % 4.9  11.5  8.3    Monocyte Rel % 4.2  7.6  4.3    Eosinophil Rel % 1.4  3.3  1.1    Basophil Rel % 0.1  0.3  " 0.5      CMP          3/7/2024    13:31 3/7/2024    23:37 3/8/2024    09:13 3/19/2024    15:08   CMP   Glucose 131  133  95  192    BUN 21   13  12    Creatinine 0.55   0.40  0.54    EGFR 106.4   114.9  106.9    Sodium 141  135.3  137  139    Potassium 4.3   3.9  4.8    Chloride 100   101  102    Calcium 9.6   8.7  9.3    Total Protein 8.2   6.1  6.9    Albumin 4.5   3.3  3.9    Globulin 3.7   2.8  3.0    Total Bilirubin 0.4   0.4  <0.2    Alkaline Phosphatase 78   60  63    AST (SGOT) 22   15  17    ALT (SGPT) 12   8  9    Albumin/Globulin Ratio 1.2   1.2  1.3    BUN/Creatinine Ratio 38.2   32.5  22.2    Anion Gap 16.6   9.0  12.0        2/6/2024 modified barrium swallow with speech-deep laryngeal penetration with thin liquid barium.  Laryngeal penetration with nectar thick barium.  Patient scored level 4 of 7 on Functional Communication Measures for swallowing indicating a 40-59% limitation in function for current status, goal status, and discharge status.    RECOMMENDATIONS:   1.  Mechanical soft diet, fork mash as needed  2.  Single sips of nectar thick liquids  3.  Oral meds in applesauce crushed if needed  4.  Slow rate, small bites/drinks  5.  90 degrees upright for all intake      3/8/24 c diff - negative.  Enteric bacterial panel - negative.      3/7/24 CT abd/pelvis w/o contrast - Previous cholecystectomy.  Limited noncontrast imaging of the liver is normal.  Spleen is borderline enlarged.  Stomach and small bowel with no acute abnormality.             Assessment and Plan              Diagnoses and all orders for this visit:    1. Weight loss, abnormal (Primary)  -     Ambulatory Referral to Speech Therapy  -     Case Request; Standing  -     Case Request    2. Epigastric pain  -     Case Request; Standing  -     Case Request    3. Nausea and vomiting, unspecified vomiting type  -     ondansetron ODT (ZOFRAN-ODT) 8 MG disintegrating tablet; Place 1 tablet on the tongue Every 8 (Eight) Hours As Needed for  Nausea or Vomiting.  Dispense: 30 tablet; Refill: 1  -     Case Request; Standing  -     Case Request    4. History of laryngeal cancer  -     Ambulatory Referral to Speech Therapy  -     Case Request; Standing  -     Case Request    5. Anorexia  -     Case Request; Standing  -     Case Request    6. Imaging of gastrointestinal tract abnormal  -     Ambulatory Referral to Speech Therapy  -     Case Request; Standing  -     Case Request    Other orders  -     Follow Anesthesia Guidelines / Protocol; Future  -     Obtain Informed Consent; Future  -     Verify NPO; Standing  -     Obtain Informed Consent; Standing          ESOPHAGOGASTRODUODENOSCOPY (N/A)The risk of the endoscopy were discussed in detail. Possible risks/complications, benefits, and alternatives to surgical or invasive procedure have been explained to patient and/or legal guardian; risks include bleeding, infection, and perforation. Patient has been evaluated and can tolerate anesthesia and/or sedation.         Follow Up     Follow Up   Return in about 5 months (around 9/8/2024) for abdominal pain.  Patient was given instructions and counseling regarding her condition or for health maintenance advice. Please see specific information pulled into the AVS if appropriate.

## 2024-04-08 NOTE — TELEPHONE ENCOUNTER
4/8/2024    Dear Dr Wright,     Patient: Isha Llanes   YOB: 1965        This patient is waiting to have an Esophagogastroduodenoscopy which I will perform at [facility] on 04/16/2024.     Our records indicate this patient is currently taking Plavix. This procedure requires the patient to suspend their anticoagulant medication prior to surgery.     Please respond to this request noting your recommendations. You may contact our office at [phone] Option 3 with any questions. I appreciate your prompt response in this matter.     Please return this form to our office no later than two weeks prior to the procedure date listed above. Please return form to [phone].     ____ I approve my patient to stop taking their Anticoagulant Therapy medication 5 days prior to the scheduled procedure.    ____ I do NOT approve my patient to stop taking their Anticoagulant Therapy medication at this time.      Please specify clearance expiration date:_____________________________    Approving physician name (please print):     _____________________________________________    Approving physician signature:     ________________________________    Date:________________        Sincerely,  [facility]   Gastroenterology -

## 2024-04-09 ENCOUNTER — TELEPHONE (OUTPATIENT)
Dept: FAMILY MEDICINE CLINIC | Facility: CLINIC | Age: 59
End: 2024-04-09

## 2024-04-09 DIAGNOSIS — L97.222 VENOUS STASIS ULCER OF LEFT CALF WITH FAT LAYER EXPOSED WITHOUT VARICOSE VEINS: ICD-10-CM

## 2024-04-09 DIAGNOSIS — Z74.09 LIMITED MOBILITY: Primary | ICD-10-CM

## 2024-04-09 DIAGNOSIS — L97.212 VENOUS STASIS ULCER OF RIGHT CALF WITH FAT LAYER EXPOSED WITHOUT VARICOSE VEINS: ICD-10-CM

## 2024-04-09 DIAGNOSIS — I87.2 VENOUS STASIS ULCER OF RIGHT CALF WITH FAT LAYER EXPOSED WITHOUT VARICOSE VEINS: ICD-10-CM

## 2024-04-09 DIAGNOSIS — I87.2 VENOUS STASIS ULCER OF LEFT CALF WITH FAT LAYER EXPOSED WITHOUT VARICOSE VEINS: ICD-10-CM

## 2024-04-09 DIAGNOSIS — F41.9 ANXIETY: ICD-10-CM

## 2024-04-09 DIAGNOSIS — I50.9 CONGESTIVE HEART FAILURE, UNSPECIFIED HF CHRONICITY, UNSPECIFIED HEART FAILURE TYPE: ICD-10-CM

## 2024-04-09 DIAGNOSIS — L08.9 SKIN INFECTION: ICD-10-CM

## 2024-04-09 RX ORDER — FUROSEMIDE 40 MG/1
40 TABLET ORAL 2 TIMES DAILY
Qty: 90 TABLET | Refills: 2 | Status: SHIPPED | OUTPATIENT
Start: 2024-04-09

## 2024-04-09 RX ORDER — ALPRAZOLAM 0.5 MG/1
0.5 TABLET ORAL 2 TIMES DAILY PRN
Qty: 60 TABLET | Refills: 0 | Status: CANCELLED | OUTPATIENT
Start: 2024-04-09 | End: 2024-06-08

## 2024-04-09 NOTE — TELEPHONE ENCOUNTER
Caller: Cyndee Llanesgloria LIND    Relationship: Self    Best call back number: [phone]     Requested Prescriptions:   Requested Prescriptions     Pending Prescriptions Disp Refills    ALPRAZolam (Xanax) 0.5 MG tablet 60 tablet 0     Sig: Take 1 tablet by mouth 2 (Two) Times a Day As Needed for Anxiety for up to 60 days.    mupirocin (BACTROBAN) 2 % ointment 30 g 2     Sig: Apply 1 Application topically to the appropriate area as directed 3 (Three) Times a Day.        Pharmacy where request should be sent: [facility] - [facility] [place] - [address] - [phone]  - [phone] FX     Last office visit with prescribing clinician: 3/19/2024   Last telemedicine visit with prescribing clinician: 3/5/2024   Next office visit with prescribing clinician: 4/9/2024     Additional details provided by patient:     Does the patient have less than a 3 day supply:  [x] Yes  [] No    Would you like a call back once the refill request has been completed: [] Yes [x] No    If the office needs to give you a call back, can they leave a voicemail: [] Yes [x] No    Maximilian Olmstead Rep   04/09/24 10:33 EDT

## 2024-04-10 ENCOUNTER — OFFICE VISIT (OUTPATIENT)
Dept: FAMILY MEDICINE CLINIC | Facility: CLINIC | Age: 59
End: 2024-04-10
Payer: COMMERCIAL

## 2024-04-10 VITALS
WEIGHT: 101 LBS | TEMPERATURE: 97.5 F | DIASTOLIC BLOOD PRESSURE: 60 MMHG | OXYGEN SATURATION: 92 % | BODY MASS INDEX: 16.23 KG/M2 | HEIGHT: 66 IN | SYSTOLIC BLOOD PRESSURE: 80 MMHG | HEART RATE: 77 BPM

## 2024-04-10 DIAGNOSIS — L97.222 VENOUS STASIS ULCER OF LEFT CALF WITH FAT LAYER EXPOSED WITHOUT VARICOSE VEINS: ICD-10-CM

## 2024-04-10 DIAGNOSIS — I87.2 VENOUS STASIS ULCER OF RIGHT CALF WITH FAT LAYER EXPOSED WITHOUT VARICOSE VEINS: ICD-10-CM

## 2024-04-10 DIAGNOSIS — L97.212 VENOUS STASIS ULCER OF RIGHT CALF WITH FAT LAYER EXPOSED WITHOUT VARICOSE VEINS: ICD-10-CM

## 2024-04-10 DIAGNOSIS — R63.4 WEIGHT LOSS: ICD-10-CM

## 2024-04-10 DIAGNOSIS — I87.2 VENOUS STASIS ULCER OF LEFT CALF WITH FAT LAYER EXPOSED WITHOUT VARICOSE VEINS: ICD-10-CM

## 2024-04-10 DIAGNOSIS — R63.0 ANOREXIA: Primary | ICD-10-CM

## 2024-04-10 DIAGNOSIS — F41.9 ANXIETY: ICD-10-CM

## 2024-04-10 PROCEDURE — 1160F RVW MEDS BY RX/DR IN RCRD: CPT | Performed by: STUDENT IN AN ORGANIZED HEALTH CARE EDUCATION/TRAINING PROGRAM

## 2024-04-10 PROCEDURE — 99214 OFFICE O/P EST MOD 30 MIN: CPT | Performed by: STUDENT IN AN ORGANIZED HEALTH CARE EDUCATION/TRAINING PROGRAM

## 2024-04-10 PROCEDURE — 1159F MED LIST DOCD IN RCRD: CPT | Performed by: STUDENT IN AN ORGANIZED HEALTH CARE EDUCATION/TRAINING PROGRAM

## 2024-04-10 RX ORDER — ALPRAZOLAM 0.5 MG/1
0.5 TABLET ORAL 2 TIMES DAILY PRN
Qty: 60 TABLET | Refills: 0 | Status: SHIPPED | OUTPATIENT
Start: 2024-04-10 | End: 2024-06-09

## 2024-04-10 RX ORDER — MEGESTROL ACETATE 20 MG/1
20 TABLET ORAL DAILY
Qty: 30 TABLET | Refills: 2 | Status: SHIPPED | OUTPATIENT
Start: 2024-04-10 | End: 2024-07-09

## 2024-04-10 RX ORDER — GINSENG 100 MG
1 CAPSULE ORAL 2 TIMES DAILY
Qty: 28 G | Refills: 5 | Status: SHIPPED | OUTPATIENT
Start: 2024-04-10

## 2024-04-10 NOTE — TELEPHONE ENCOUNTER
S/w Randi at 's office she states she spoke with  and was told the pt can suspend Plavix I requested this notification be placed in the pts chart as well.

## 2024-04-10 NOTE — PROGRESS NOTES
"Chief Complaint  Follow-up    Subjective      History of Present Illness    Isha Llanes is a 58 y.o. 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  Pt has lost 30 lbs in the past month.. She was admitted for none night. Last jun 2023 she was 153 lbs she weight about 101 today almost 10 months later.  She completed radiation in July of last year.   Patient states she just does not feel hungry she has not had any nausea or vomiting anymore she is currently feeling better from that standpoint today she complains of a runny nose and congestion otherwise she seems much more happy than she did at the last visit.    Objective   Vital Signs:   Vitals:    04/10/24 1200   BP: (!) 80/60   Pulse: 77   Temp: 97.5 °F (36.4 °C)   SpO2: 92%   Weight: 45.8 kg (101 lb)   Height: 167.6 cm (66\")     Body mass index is 16.3 kg/m².    Wt Readings from Last 3 Encounters:   04/10/24 45.8 kg (101 lb)   04/08/24 45.8 kg (101 lb)   03/19/24 57.2 kg (126 lb)     BP Readings from Last 3 Encounters:   04/10/24 (!) 80/60   04/08/24 116/71   03/08/24 103/70       Health Maintenance   Topic Date Due    DIABETIC EYE EXAM  Never done    ZOSTER VACCINE (1 of 2) Never done    ANNUAL PHYSICAL  Never done    DIABETIC FOOT EXAM  Never done    PAP SMEAR  03/29/2017    LIPID PANEL  01/12/2024    HEMOGLOBIN A1C  05/29/2024    COVID-19 Vaccine (1) 05/10/2024 (Originally 12/4/1970)    Hepatitis B (1 of 3 - 19+ 3-dose series) 02/19/2025 (Originally 12/4/1984)    Pneumococcal Vaccine 0-64 (1 of 2 - PCV) 02/19/2025 (Originally 12/4/1971)    MAMMOGRAM  02/19/2025 (Originally 1/8/2021)    " TDAP/TD VACCINES (1 - Tdap) 02/19/2025 (Originally 12/4/1984)    INFLUENZA VACCINE  08/01/2024    LUNG CANCER SCREENING  09/05/2024    URINE MICROALBUMIN  02/19/2025    BMI FOLLOWUP  04/10/2025    COLORECTAL CANCER SCREENING  04/13/2032    HEPATITIS C SCREENING  Completed       Physical Exam    General: Cachectic appearing, She is not in acute distress.appears chronically ill.      Appearance: Normal appearance. She is not toxic-appearing.   HENT:      Head: Normocephalic and atraumatic.   Neck:      Comments: Development of submental lymphedema continues to be present  Pulmonary:      Effort: Pulmonary effort is normal. No respiratory distress.   Neurological:      General: No focal deficit present.      Mental Status: She is alert and oriented to person, place, and time.      Cranial Nerves: No cranial nerve deficit.   Psychiatric:         Mood and Affect: Mood normal.         Behavior: Behavior normal.         Judgment: Judgment normal.   Result Review :     The following data was reviewed by: Katia Wright MD on 04/10/2024:      Procedures          Diagnoses and all orders for this visit:    1. Anorexia (Primary)  -     megestrol (MEGACE) 20 MG tablet; Take 1 tablet by mouth Daily for 90 days.  Dispense: 30 tablet; Refill: 2  -     DME ORAL SUPPLEMENTS    2. Anxiety  -     ALPRAZolam (Xanax) 0.5 MG tablet; Take 1 tablet by mouth 2 (Two) Times a Day As Needed for Anxiety for up to 60 days.  Dispense: 60 tablet; Refill: 0    3. Venous stasis ulcer of left calf with fat layer exposed without varicose veins  -     bacitracin 500 UNIT/GM ointment; Apply 1 Application topically to the appropriate area as directed 2 (Two) Times a Day.  Dispense: 28 g; Refill: 5    4. Venous stasis ulcer of right calf with fat layer exposed without varicose veins  -     bacitracin 500 UNIT/GM ointment; Apply 1 Application topically to the appropriate area as directed 2 (Two) Times a Day.  Dispense: 28 g; Refill: 5    5. Weight loss  -      megestrol (MEGACE) 20 MG tablet; Take 1 tablet by mouth Daily for 90 days.  Dispense: 30 tablet; Refill: 2  -     DME ORAL SUPPLEMENTS  -     Ambulatory Referral to Nutrition Services    6. Body mass index (BMI) less than 16.5       Encouraged her to increase the amount of p.o. intake.  Will send her to a nutritionist, will start her on Megace.  Patient requesting refill for bacitracin ointment.  BMI is below normal parameters (malnutrition). Recommendations: referral to dietitian, treating the underlying disease process, and Information on healthy weight added to patient's after visit summary         FOLLOW UP  Return in about 2 weeks (around 4/24/2024).  Patient was given instructions and counseling regarding her condition or for health maintenance advice. Please see specific information pulled into the AVS if appropriate.       Katia Wright MD  04/10/24  13:39 EDT    CURRENT & DISCONTINUED MEDICATIONS  Current Outpatient Medications   Medication Instructions    albuterol (PROVENTIL) 2.5 mg, Nebulization, Every 4 Hours PRN    albuterol sulfate  (90 Base) MCG/ACT inhaler 2 puffs, Inhalation, Every 4 Hours PRN    ALPRAZolam (XANAX) 0.5 mg, Oral, 2 Times Daily PRN    bacitracin 0.9 g, Topical, 2 Times Daily    cetirizine (zyrTEC) 10 MG tablet 1 tablet, Oral, Daily    clopidogrel (PLAVIX) 75 mg, Oral, Daily    furosemide (LASIX) 40 mg, Oral, 2 Times Daily    glucose blood test strip Use as instructed    HYDROcodone-acetaminophen (NORCO) 5-325 MG per tablet 1 tablet, Oral, Daily PRN    hydroxychloroquine (PLAQUENIL) 200 mg, Oral, Daily    Lancets (OneTouch Delica Plus [id number]) misc     megestrol (MEGACE) 20 mg, Oral, Daily    midodrine (PROAMATINE) 10 mg, Oral, 3 Times Daily Before Meals    mupirocin (BACTROBAN) 2 % ointment 1 Application, Topical, 3 Times Daily    nicotine polacrilex (COMMIT) 2 mg, Mouth/Throat, As Needed    O2 (OXYGEN) Inhalation, Once    ondansetron ODT (ZOFRAN-ODT) 8 mg, Translingual,  Every 8 Hours PRN    pain patient supplied pump Intrathecal, Continuous, <BR>Type of Medication: Hydromorphone 15 mg/ml  and Bupivacaine 0.5 mg/ml<BR>Provider:Dr. Boudreaux<BR>Provider number: [phone]<BR>Basal Dose: Hydromorphone 7.518 mg/day Bupivacaine 0.96189 mg/day<BR>( MAX of Hydromorphone 9.940 mg/ day; Bupivacaine 0.74201 mg /day)<BR>Bolus Dose:Hydromorphone 0.500 mg, Bupivacaine 0.69400 mg<BR>Lockout 3 hours. 1 Bolus per every 3 hours max of 5 bolus per day<BR>Next refill/ Alarm Date- 4/24/24<BR>Pump manufacture:[employer]<BR><BR><BR>     predniSONE (DELTASONE) 10 mg, Oral, Daily       Medications Discontinued During This Encounter   Medication Reason    bacitracin 500 UNIT/GM ointment Reorder    ALPRAZolam (Xanax) 0.5 MG tablet Reorder

## 2024-04-11 NOTE — TELEPHONE ENCOUNTER
Pt is aware of suspension instructions. Requested official clearance be placed in chart again. S/w Randi at dr.Lutfis mackay

## 2024-04-12 NOTE — PRE-PROCEDURE INSTRUCTIONS
"Instructed on date and arrival time of 1030. Come to entrance \"C\".  Must have  over age 18 to drive home.  May have two visitors; however, children under 12 must stay in waiting room.  Discussed diet/NPO.  May take medications as usual except for blood thinners, diabetic medications, or weight loss medications.  Verbalized understanding of instructions given.  Instructed to call for questions or concerns.  Hold Plavix for 5 days prior to procedure.  Clearance noted in chart.  "

## 2024-04-15 ENCOUNTER — ANESTHESIA EVENT (OUTPATIENT)
Dept: GASTROENTEROLOGY | Facility: HOSPITAL | Age: 59
End: 2024-04-15
Payer: COMMERCIAL

## 2024-04-15 ENCOUNTER — TELEPHONE (OUTPATIENT)
Dept: FAMILY MEDICINE CLINIC | Facility: CLINIC | Age: 59
End: 2024-04-15
Payer: COMMERCIAL

## 2024-04-15 NOTE — TELEPHONE ENCOUNTER
Pt is calling to say that she is needing wound care supplies for her legs. She said that her legs are weeping. She said that the supplies that were sent were wrong. Please call the pt back at [phone]

## 2024-04-15 NOTE — ANESTHESIA PREPROCEDURE EVALUATION
Anesthesia Evaluation     Patient summary reviewed and Nursing notes reviewed   NPO Solid Status: > 8 hours  NPO Liquid Status: > 8 hours           Airway   Mallampati: III  TM distance: >3 FB  Neck ROM: full  Possible difficult intubation  Comment: Hx of laryngeal CA   Dental    (+) edentulous    Pulmonary - normal exam    breath sounds clear to auscultation  (+) pulmonary embolism, a smoker, COPD, asthma,shortness of breath, sleep apnea  Cardiovascular - normal exam    ECG reviewed  Rhythm: regular  Rate: normal    (+) hypertension, valvular problems/murmurs (TAVR present) AS, MVP and murmur, past MI , CAD, CHF , PVD      Neuro/Psych  (+) numbness, psychiatric history Anxiety, Depression and PTSD  GI/Hepatic/Renal/Endo    (+) hiatal hernia, GERD, liver disease, renal disease-, diabetes mellitus type 2    Musculoskeletal     (+) back pain, neck pain  Abdominal    Substance History      OB/GYN          Other   arthritis,   history of cancer    ROS/Med Hx Other: EKG 03/07/24: ,   Sinus tachycardia  Probable left atrial enlargement  Left bundle branch block  ST elevation secondary to IVCD    ECHO 05/26/23:   ·  Left ventricular ejection fraction appears to be 46 - 50%.  ·  Left ventricular wall thickness is consistent with mild concentric hypertrophy.  ·  Left ventricular diastolic function was indeterminate.  ·  There is a TAVR valve present.  ·  Moderate to severe tricuspid valve regurgitation is present.  ·  Estimated right ventricular systolic pressure from tricuspid regurgitation is mildly elevated (35-45 mmHg).     There were no apparent intracardiac masses, vegetations or thrombi.    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 She states she has been doing this for years (narcolepsy) and is not drug related.     Pt reports having panic attacks.                      Anesthesia Plan    ASA 4     general   total IV anesthesia  (Total IV  Anesthesia    Patient understands anesthesia not responsible for dental damage.  )  intravenous induction     Anesthetic plan, risks, benefits, and alternatives have been provided, discussed and informed consent has been obtained with: patient.    Plan discussed with CRNA.      CODE STATUS:

## 2024-04-16 ENCOUNTER — ANESTHESIA (OUTPATIENT)
Dept: GASTROENTEROLOGY | Facility: HOSPITAL | Age: 59
End: 2024-04-16
Payer: COMMERCIAL

## 2024-04-16 ENCOUNTER — HOSPITAL ENCOUNTER (OUTPATIENT)
Facility: HOSPITAL | Age: 59
Setting detail: HOSPITAL OUTPATIENT SURGERY
Discharge: HOME | End: 2024-04-16
Attending: INTERNAL MEDICINE | Admitting: INTERNAL MEDICINE
Payer: COMMERCIAL

## 2024-04-16 VITALS
RESPIRATION RATE: 13 BRPM | DIASTOLIC BLOOD PRESSURE: 69 MMHG | SYSTOLIC BLOOD PRESSURE: 99 MMHG | BODY MASS INDEX: 16.3 KG/M2 | TEMPERATURE: 97.4 F | OXYGEN SATURATION: 95 % | HEIGHT: 66 IN | HEART RATE: 76 BPM

## 2024-04-16 DIAGNOSIS — R11.2 NAUSEA AND VOMITING, UNSPECIFIED VOMITING TYPE: ICD-10-CM

## 2024-04-16 DIAGNOSIS — R10.13 EPIGASTRIC PAIN: ICD-10-CM

## 2024-04-16 DIAGNOSIS — R63.0 ANOREXIA: ICD-10-CM

## 2024-04-16 DIAGNOSIS — Z85.21 HISTORY OF LARYNGEAL CANCER: ICD-10-CM

## 2024-04-16 DIAGNOSIS — R63.4 WEIGHT LOSS, ABNORMAL: ICD-10-CM

## 2024-04-16 LAB — GLUCOSE BLDC GLUCOMTR-MCNC: 139 MG/DL (ref 70–99)

## 2024-04-16 PROCEDURE — 25010000002 PROPOFOL 10 MG/ML EMULSION: Performed by: NURSE ANESTHETIST, CERTIFIED REGISTERED

## 2024-04-16 PROCEDURE — 82948 REAGENT STRIP/BLOOD GLUCOSE: CPT

## 2024-04-16 PROCEDURE — 25010000002 MIDAZOLAM PER 1MG: Performed by: NURSE ANESTHETIST, CERTIFIED REGISTERED

## 2024-04-16 PROCEDURE — 25810000003 LACTATED RINGERS PER 1000 ML

## 2024-04-16 PROCEDURE — 88305 TISSUE EXAM BY PATHOLOGIST: CPT | Performed by: INTERNAL MEDICINE

## 2024-04-16 RX ORDER — LIDOCAINE HYDROCHLORIDE 40 MG/ML
INJECTION, SOLUTION RETROBULBAR AS NEEDED
Status: DISCONTINUED | OUTPATIENT
Start: 2024-04-16 | End: 2024-04-16 | Stop reason: SURG

## 2024-04-16 RX ORDER — LIDOCAINE HYDROCHLORIDE 20 MG/ML
INJECTION, SOLUTION EPIDURAL; INFILTRATION; INTRACAUDAL; PERINEURAL AS NEEDED
Status: DISCONTINUED | OUTPATIENT
Start: 2024-04-16 | End: 2024-04-16 | Stop reason: SURG

## 2024-04-16 RX ORDER — PROPOFOL 10 MG/ML
VIAL (ML) INTRAVENOUS AS NEEDED
Status: DISCONTINUED | OUTPATIENT
Start: 2024-04-16 | End: 2024-04-16 | Stop reason: SURG

## 2024-04-16 RX ORDER — PANTOPRAZOLE SODIUM 20 MG/1
20 TABLET, DELAYED RELEASE ORAL DAILY
Qty: 30 TABLET | Refills: 1 | Status: SHIPPED | OUTPATIENT
Start: 2024-04-16

## 2024-04-16 RX ORDER — SODIUM CHLORIDE, SODIUM LACTATE, POTASSIUM CHLORIDE, CALCIUM CHLORIDE 600; 310; 30; 20 MG/100ML; MG/100ML; MG/100ML; MG/100ML
30 INJECTION, SOLUTION INTRAVENOUS CONTINUOUS
Status: DISCONTINUED | OUTPATIENT
Start: 2024-04-16 | End: 2024-04-16 | Stop reason: HOSPADM

## 2024-04-16 RX ORDER — MIDAZOLAM HYDROCHLORIDE 2 MG/2ML
1 INJECTION, SOLUTION INTRAMUSCULAR; INTRAVENOUS ONCE
Status: COMPLETED | OUTPATIENT
Start: 2024-04-16 | End: 2024-04-16

## 2024-04-16 RX ADMIN — MIDAZOLAM HYDROCHLORIDE 1 MG: 1 INJECTION, SOLUTION INTRAMUSCULAR; INTRAVENOUS at 12:16

## 2024-04-16 RX ADMIN — PROPOFOL 30 MG: 10 INJECTION, EMULSION INTRAVENOUS at 12:32

## 2024-04-16 RX ADMIN — PROPOFOL 150 MCG/KG/MIN: 10 INJECTION, EMULSION INTRAVENOUS at 12:34

## 2024-04-16 RX ADMIN — LIDOCAINE HYDROCHLORIDE 100 MG: 20 INJECTION, SOLUTION INTRAVENOUS at 12:32

## 2024-04-16 RX ADMIN — SODIUM CHLORIDE, POTASSIUM CHLORIDE, SODIUM LACTATE AND CALCIUM CHLORIDE 30 ML/HR: 600; 310; 30; 20 INJECTION, SOLUTION INTRAVENOUS at 11:40

## 2024-04-16 RX ADMIN — PROPOFOL 20 MG: 10 INJECTION, EMULSION INTRAVENOUS at 12:33

## 2024-04-16 RX ADMIN — LIDOCAINE HYDROCHLORIDE 3 ML: 40 INJECTION, SOLUTION RETROBULBAR; TOPICAL at 12:24

## 2024-04-16 NOTE — H&P
Pre Procedure History & Physical    Chief Complaint:   Nausea, vomiting, epigastric pain    Subjective     HPI:   57 yo F here for eval of nausea, vomiting, epigastric pain.    Past Medical History:   Past Medical History:   Diagnosis Date    Anesthesia     REPORTS HAS GOT REAL EMOTIONAL AND HAS BECOME ANGRY BEFORE    Anxiety     Aortic stenosis, severe     HAS BIO VALVE REPLACED    Arthritis     Asthma     Back pain     Blood clotting disorder     Cancer     Cancer associated pain     HODGKINS LYMPHOMA    CHF (congestive heart failure)     Chronic low back pain with sciatica     Chronic pain disorder     COPD (chronic obstructive pulmonary disease)     Coronary artery disease     Diabetes mellitus     TYPE 2    Diabetes mellitus     Dyspnea on effort     GERD (gastroesophageal reflux disease)     H/O lumpectomy     H/O: hysterectomy     Hiatal hernia     History of degenerative disc disease     History of kidney stones     History of pulmonary embolus (PE)     History of transfusion     AS A CHILD NO TRANSFUSION REACTION    History of transfusion     Hodgkin's lymphoma     5/19/23  5 YEARS SINCE LAST CHEMO TX    Hypertension     Immobility     Liver disease     DENIES ANY CURRENT ISSUES    Lupus     Mitral valve prolapse     Nausea vomiting and diarrhea 3/7/2024    Panic disorder     Pelvic pain     Peripheral neuropathy     Personal history of DVT (deep vein thrombosis)     Presence of intrathecal pump     PTSD (post-traumatic stress disorder)     Sacroiliac joint disease     SI (sacroiliac) joint inflammation     Sleep apnea     Venous insufficiency        Past Surgical History:  Past Surgical History:   Procedure Laterality Date    ABDOMINAL SURGERY      BACK SURGERY      SPINAL FUSION     BACK SURGERY      BLADDER REPAIR      CARDIAC CATHETERIZATION N/A 03/06/2019    Procedure: Valvuloplasty;  Surgeon: Wayne Johnson MD;  Location: [facility] CATH INVASIVE LOCATION;  Service: Cardiology    CARDIAC  CATHETERIZATION      CARDIAC CATHETERIZATION Left 5/31/2023    Procedure: Cardiac Catheterization/Vascular Study;  Surgeon: Poncho Reid MD;  Location: [facility] CATH INVASIVE LOCATION;  Service: Cardiovascular;  Laterality: Left;    CARDIAC SURGERY      CARDIAC VALVE REPLACEMENT  2021    CHOLECYSTECTOMY      COLONOSCOPY N/A 12/29/2021    Procedure: COLONOSCOPY;  Surgeon: Karine Orlando MD;  Location: [facility] ENDOSCOPY;  Service: Gastroenterology;  Laterality: N/A;  POOR PREP    COLONOSCOPY N/A 04/13/2022    Procedure: COLONOSCOPY WITH POLYPECTOMY;  Surgeon: Karine Orlando MD;  Location: [facility] ENDOSCOPY;  Service: Gastroenterology;  Laterality: N/A;  COLON POLYP, HEMORRHOIDS, POOR PREP    COLONOSCOPY      DIRECT LARYNGOSCOPY, ESOPHAGOSCOPY, BRONCHOSCOPY N/A 5/22/2023    Procedure: DIRECT LARYNGOSCOPY WITH BIOPSIES , ESOPHAGOSCOPY, BRONCHOSCOPY;  Surgeon: Ronnie Mcgarry MD;  Location: [facility] OR OSC;  Service: ENT;  Laterality: N/A;    ENDOSCOPY N/A 12/29/2021    Procedure: ESOPHAGOGASTRODUODENOSCOPY;  Surgeon: Karine Orlando MD;  Location: [facility] ENDOSCOPY;  Service: Gastroenterology;  Laterality: N/A;  ESOPHAGITIS, GASTRITIS, HIATAL HERNIA    ENDOSCOPY      HYSTERECTOMY      LYMPHADENECTOMY      PAIN PUMP INSERTION/REVISION N/A 10/18/2019    Procedure: PAIN PUMP INSERTION [facility] 10-18-19 [place];  Surgeon: Horace Rios MD;  Location: [facility];  Service: Pain Management    PAIN PUMP INSERTION/REVISION N/A 11/23/2020    Procedure: pain pump removal;  Surgeon: Horace Rios MD;  Location: [facility];  Service: Pain Management;  Laterality: N/A;    PEG TUBE INSERTION N/A 7/26/2023    Procedure: PERCUTANEOUS ENDOSCOPIC GASTROSTOMY TUBE INSERTION;  Surgeon: Kody Mercer MD;  Location: [facility] ENDOSCOPY;  Service: General;  Laterality: N/A;  SUCCESSFUL PEG TUBE PLACE PLACEMENT    PORTACATH PLACEMENT      IN LEFT CHEST REPORTS THAT IT IS NOT FUNCTIONING     SKIN BIOPSY      TONSILLECTOMY      TUBAL ABDOMINAL LIGATION      TUMOR REMOVAL         Family History:  Family History   Problem Relation Age of Onset    Heart failure Mother     Anxiety disorder Mother     Prostate cancer Father     Depression Sister     Bipolar disorder Sister     Pancreatic cancer Sister     ADD / ADHD Brother     Thyroid cancer Maternal Grandmother     Pancreatic cancer Paternal Grandmother     ADD / ADHD Grandson     ADD / ADHD Granddaughter     ADD / ADHD Nephew     [place] Hyperthermia Neg Hx        Social History:   reports that she has been smoking cigarettes. She started smoking about 45 years ago. She has a 67.9 pack-year smoking history. She has never used smokeless tobacco. She reports that she does not drink alcohol and does not use drugs.    Medications:   Medications Prior to Admission   Medication Sig Dispense Refill Last Dose    albuterol (PROVENTIL) 2.5 MG/0.5ML nebulizer solution Take 2.5 mg by nebulization Every 4 (Four) Hours As Needed for Wheezing or Shortness of Air. 20 mL 0 4/16/2024    albuterol sulfate  (90 Base) MCG/ACT inhaler Inhale 2 puffs Every 4 (Four) Hours As Needed for Wheezing. 18 g 2 4/16/2024    ALPRAZolam (Xanax) 0.5 MG tablet Take 1 tablet by mouth 2 (Two) Times a Day As Needed for Anxiety for up to 60 days. 60 tablet 0 4/16/2024    cetirizine (zyrTEC) 10 MG tablet Take 1 tablet by mouth Daily.   4/16/2024    furosemide (Lasix) 40 MG tablet Take 1 tablet by mouth 2 (Two) Times a Day. 90 tablet 2 4/16/2024    HYDROcodone-acetaminophen (NORCO) 5-325 MG per tablet Take 1 tablet by mouth Daily As Needed for Moderate Pain.   4/16/2024    hydroxychloroquine (PLAQUENIL) 200 MG tablet Take 1 tablet by mouth Daily for 90 days. Indications: Rheumatoid Arthritis 90 tablet 0 4/16/2024    megestrol (MEGACE) 20 MG tablet Take 1 tablet by mouth Daily for 90 days. 30 tablet 2 4/16/2024    nicotine polacrilex (COMMIT) 2 MG lozenge Dissolve 1 lozenge in the mouth As  Needed for Smoking Cessation. 81 each 0 4/16/2024    ondansetron ODT (ZOFRAN-ODT) 8 MG disintegrating tablet Place 1 tablet on the tongue Every 8 (Eight) Hours As Needed for Nausea or Vomiting. 30 tablet 1 4/16/2024    predniSONE (DELTASONE) 10 MG tablet Take 1 tablet by mouth Daily. 30 tablet 3 4/16/2024    bacitracin 500 UNIT/GM ointment Apply 1 Application topically to the appropriate area as directed 2 (Two) Times a Day. 28 g 5     clopidogrel (PLAVIX) 75 MG tablet Take 1 tablet by mouth Daily. 90 tablet 2 4/11/2024    glucose blood test strip Use as instructed 100 each 12     Lancets (OneTouch Delica Plus [id number]) misc        midodrine (PROAMATINE) 10 MG tablet Take 1 tablet by mouth 3 (Three) Times a Day Before Meals for 30 days. 90 tablet 0     mupirocin (BACTROBAN) 2 % ointment Apply 1 Application topically to the appropriate area as directed 3 (Three) Times a Day. 30 g 2     O2 (OXYGEN) Inhale 1 (One) Time.       pain patient supplied pump by Intrathecal route Continuous.   Type of Medication: Hydromorphone 15 mg/ml  and Bupivacaine 0.5 mg/ml  Provider:Dr. Boudreaux  Provider number: [phone]  Basal Dose: Hydromorphone 7.518 mg/day Bupivacaine 0.31090 mg/day  ( MAX of Hydromorphone 9.940 mg/ day; Bupivacaine 0.46692 mg /day)  Bolus Dose:Hydromorphone 0.500 mg, Bupivacaine 0.58390 mg  Lockout 3 hours. 1 Bolus per every 3 hours max of 5 bolus per day  Next refill/ Alarm Date- 4/24/24  Pump manufacture:Prelert          Allergies:  Amoxicillin, Ceclor [cefaclor], Penicillins, Sulfa antibiotics, Valium [diazepam], Ambien [zolpidem], Aspirin, Ativan [lorazepam], Benadryl [diphenhydramine], Biaxin [clarithromycin], Cephalexin, Clindamycin, Compazine [prochlorperazine edisylate], Contrast dye (echo or unknown ct/mr), Doxycycline, Nsaids, Phenergan [promethazine hcl], Promethazine, and Vancomycin    ROS:    Pertinent items are noted in HPI     Objective     Blood pressure 110/77, pulse 88, temperature 97.5 °F  "(36.4 °C), temperature source Temporal, resp. rate 16, height 167.6 cm (66\"), SpO2 93%, not currently breastfeeding.    Physical Exam   Constitutional: Pt is oriented to person, place, and time and well-developed, well-nourished, and in no distress.   Mouth/Throat: Oropharynx is clear and moist.   Neck: Normal range of motion.   Cardiovascular: Normal rate, regular rhythm and normal heart sounds.    Pulmonary/Chest: Effort normal and breath sounds normal.   Abdominal: Soft. Nontender  Skin: Skin is warm and dry.   Psychiatric: Mood, memory, affect and judgment normal.     Assessment & Plan     Diagnosis:  Nausea, vomiting, epigastric pain    Anticipated Surgical Procedure:  EGD    The risks, benefits, and alternatives of this procedure have been discussed with the patient or the responsible party- the patient understands and agrees to proceed.           "

## 2024-04-16 NOTE — TELEPHONE ENCOUNTER
"    [facility] Medicine Services  INPATIENT PROGRESS NOTE    Patient Name: Mickey Conde  Date of Admission: 9/14/2022  Today's Date: 09/16/22  Length of Stay: 2  Primary Care Physician: Ki Tamez MD    Subjective   Chief Complaint: f/u   HPI   \"How long do I have to be in the hospital?\"  Has not had bm today;   2 episodes of dark-colored stool yesterday he said    Patient been off Coumadin.  INR noted at 2.13.  Patient states that he has \"fast heart rate\" in the past that he had to be placed on anticoagulation  EKG revisited today.  It showed sinus rhythm with chronic LBBB  Review of Systems   No abdominal pain  No urinary disturbance  No fever  No chest pain    All pertinent negatives and positives are as above. All other systems have been reviewed and are negative unless otherwise stated.     Objective    Temp:  [97.8 °F (36.6 °C)-98.7 °F (37.1 °C)] 98.2 °F (36.8 °C)  Heart Rate:  [56-69] 57  Resp:  [16-20] 18  BP: (107-155)/(43-79) 131/66  Physical Exam    No distress  Seated comfortably on recliner  Several missing teeth  GEN: Awake, alert, interactive, in NAD  HEENT: Atraumatic, PERRLA, EOMI, Anicteric, Trachea midline  Lungs: CTAB, no wheezing/rales/rhonchi  Heart: RRR, +S1/s2, no rub  ABD: soft, nt/nd, +BS, no guarding/rebound  Extremities: atraumatic, no cyanosis, no edema  Skin: no rashes or lesions  Neuro: AAOx3, no focal deficits  Psych: normal mood & affect       Results Review:  I have reviewed the labs, radiology results, and diagnostic studies.    Laboratory Data:   Results from last 7 days   Lab Units 09/16/22  0714 09/16/22  0438 09/16/22  0030 09/14/22  1531 09/14/22  0915   WBC 10*3/mm3  --   --   --   --  14.66*   HEMOGLOBIN g/dL 8.9* 9.1* 9.9*   < > 12.7*   HEMATOCRIT % 28.2* 27.7* 29.4*   < > 38.0   PLATELETS 10*3/mm3  --   --   --   --  260    < > = values in this interval not displayed.        Results from last 7 days   Lab Units 09/16/22  0438 " Hub to relay. They should be sent out to the pt soon I have placed an order with [facility] last week again.   09/15/22  0210 09/14/22  0915   SODIUM mmol/L 147* 149* 145   POTASSIUM mmol/L 3.7 3.5 3.6   CHLORIDE mmol/L 116* 114* 104   CO2 mmol/L 23.0 25.0 26.0   BUN mg/dL 66* 109* 115*   CREATININE mg/dL 1.80* 2.11* 2.07*   CALCIUM mg/dL 8.2* 8.5* 9.6   BILIRUBIN mg/dL  --   --  0.5   ALK PHOS U/L  --   --  42   ALT (SGPT) U/L  --   --  19   AST (SGOT) U/L  --   --  19   GLUCOSE mg/dL 104* 109* 242*       Culture Data:   Blood Culture   Date Value Ref Range Status   09/14/2022 No growth at 2 days  Preliminary   09/14/2022 No growth at 2 days  Preliminary       Radiology Data:   Imaging Results (Last 24 Hours)     Procedure Component Value Units Date/Time    US Renal Bilateral [[id number]] Collected: 09/16/22 0900     Updated: 09/16/22 0906    Narrative:      HISTORY: Acute kidney injury     Renal ultrasound: Sonographic imaging of the kidneys and bladder  performed     COMPARISON: CT abdomen pelvis 9/14/2022     FINDINGS: Kidneys are normal in echotexture. The right kidney measures  10.7 x 6.0 x 5.8 cm. There are scattered right renal cysts, largest  measuring 3.5 x 3.1 x 2.6 cm within the midportion of the right kidney.  The left kidney measures 10.8 x 7.0 x 6.3 cm. There is an inferior 2.9  cm left renal cyst. There is no solid appearing renal mass. No  obstructing intrarenal stones. No abnormal perinephric fluid collection.     The bladder is only mild to moderately distended. Small bladder wall  diverticula suspected. Mild bladder wall thickening may be due to  underdistention or muscle wall hypertrophy.       Impression:      1. Bilateral renal cysts. No solid renal mass or hydronephrosis.  2. Mild bladder wall thickening may be due to incomplete distention,  however muscle wall hypertrophy or possibly cystitis considered. Small  bladder wall diverticula.  This report was finalized on 09/16/2022 09:02 by Dr. Janeth Platt MD.        Urine sodium 37, urine creatinine 50.6, urea nitrogen 1087  I have reviewed the  patient's current medications.     Assessment/Plan     Active Hospital Problems    Diagnosis    • Upper GI hemorrhage      Problem list  · Melena  · Acute kidney injury  · Suspected upper GI bleed possibly peptic ulcer disease underlying  · Coagulopathy.  Previously on Coumadin.  This has been on hold.  · Incidental finding of infrarenal saccular aneurysm  · Incidental finding of cholelithiasis  · Hypertension-stable.  Continue present management  · Lactic acidosis probably from transient hypotension.   ·  Hyperglycemia  · Tremors  · Mildly elevated troponin in the setting of acute kidney injury.  No reported chest pain.   flat trend troponin. EKG showed sinus rhythm with first-degree AV block, PAC and left bundle branch block. Wide QRS complex, left bundle branch block on earlier EKG.Similar findings from prior EKG with exception that the heart rate was slower at 57 (July 2022)  · Chronic left bundle branch block  · Leukocytosis-no gross source of infection.  Possibly stress reaction.  Monitor closely  · hypernatremia     Plan:  Patient transitioned yesterday to half-normal saline.  Serum sodium slowly improving  Continue PPI drip  PT/INR pending; hemoglobin slowly dropping; anticoagulant on hold  Renal ultrasound reviewed as shown above.  No solid renal mass or hydronephrosis.  Mild bladder wall thickening.  Bilateral renal cysts  Renal function slowly improving; good urine output yesterday at 1.1 L; fractional excretion of sodium is less than 9% (prerenal) eosinophils 0.  Continue IV fluid  Hemodynamically stable  Avoid nephrotoxic medication.  Avoiding hydrochlorothiazide, Lasix, lisinopril at current time  Monitor H&H for bleeding; transfuse if hemoglobin less than 7 or less than 8 with active bleeding     Monitor for active bleeding  Transfuse as needed    Echocardiogram now shows improvement in his EF at 41 to 45%, still shows evidence of diastolic dysfunction.    Results for orders placed during the  hospital encounter of 09/14/22    Adult Transthoracic Echo Complete W/ Cont if Necessary Per Protocol    Interpretation Summary  · Left ventricular ejection fraction appears to be 41 - 45%. Left ventricular systolic function is mildly decreased.  · Left atrial volume is moderately increased.  · Left ventricular diastolic function is consistent with (grade I) impaired relaxation.  · Abnormal global longitudinal LV strain (GLS) = -14.0%  · The following left ventricular wall segments are akinetic: apical anterior, apical lateral, apical inferior, apical septal and apex.  · Estimated right ventricular systolic pressure from tricuspid regurgitation is normal (<35 mmHg).    Monitor for any congestion as we continue IV fluid.    atorvastatin, 20 mg, Oral, Daily  metoprolol tartrate, 25 mg, Oral, BID  sodium chloride, 10 mL, Intravenous, Q12H      Discussed with Dr. Gudino.  No plans of any procedure this weekend unless circumstances change.  Continue to monitor PT/INR.  Consider vitamin K by Sunday if INR still elevated off Coumadin  Clear diet as previous.  Defer to Dr. Gudino  Echocardiogram reviewed as above.  EKG revisited    Will change H&H to daily to limit phlebotomy that can induce further worsening anemia.  We will recheck sooner if any active bleeding    Discussed with nurse Vargas.  Discharge Planning: prob in 24-48 hours depending on brandon;l recovery and recommendation form GI service, need for transfusion    Electronically signed by Nixon Trotter MD, 09/16/22, 10:31 CDT.

## 2024-04-16 NOTE — ANESTHESIA POSTPROCEDURE EVALUATION
Patient: Isha Llanes    Procedure Summary       Date: 04/16/24 Room / Location: [facility] ENDOSCOPY 2 / [facility] ENDOSCOPY    Anesthesia Start: 1220 Anesthesia Stop: 1244    Procedure: ESOPHAGOGASTRODUODENOSCOPY WITH BIOPSIES Diagnosis:       Weight loss, abnormal      Epigastric pain      Nausea and vomiting, unspecified vomiting type      History of laryngeal cancer      Anorexia      (Weight loss, abnormal [R63.4])      (Epigastric pain [R10.13])      (Nausea and vomiting, unspecified vomiting type [R11.2])      (History of laryngeal cancer [Z85.21])      (Anorexia [R63.0])    Surgeons: Karine Orlando MD Provider: Deepa Ware CRNA    Anesthesia Type: general ASA Status: 4            Anesthesia Type: general    Vitals  Vitals Value Taken Time   /68 04/16/24 1308   Temp 36.3 °C (97.4 °F) 04/16/24 1258   Pulse 76 04/16/24 1310   Resp 13 04/16/24 1258   SpO2 95 % 04/16/24 1310   Vitals shown include unfiled device data.        Post Anesthesia Care and Evaluation    Post-procedure mental status: acceptable.  Pain management: satisfactory to patient    Airway patency: patent  Anesthetic complications: No anesthetic complications    Cardiovascular status: acceptable  Respiratory status: acceptable    Comments: Per chart review

## 2024-04-17 ENCOUNTER — TELEPHONE (OUTPATIENT)
Dept: FAMILY MEDICINE CLINIC | Facility: CLINIC | Age: 59
End: 2024-04-17
Payer: COMMERCIAL

## 2024-04-17 DIAGNOSIS — Z78.9 HISTORY OF HOMELESS: ICD-10-CM

## 2024-04-17 DIAGNOSIS — Z59.01: Primary | ICD-10-CM

## 2024-04-17 SDOH — ECONOMIC STABILITY - HOUSING INSECURITY: SHELTERED HOMELESSNESS: Z59.01

## 2024-04-17 NOTE — TELEPHONE ENCOUNTER
Name: Isha Llanes      Relationship: Self      HUB PROVIDED THE RELAY MESSAGE FROM THE OFFICE    ADDITIONAL INFORMATION: PATIENT STATES THAT THE ORDER IS STILL WRONG, PATIENT WAS TRANSFERRED TO OFFICE

## 2024-04-17 NOTE — TELEPHONE ENCOUNTER
Pt called to ask if [facility] was given her new address as it is in chart. I TTC [facility] unable to connect with representative.     Pt states she only received tape and no other supplies.

## 2024-04-18 NOTE — TELEPHONE ENCOUNTER
HUB TO RELAY: Tried calling [facility] they are not open yet I will call back and get that figured out later on today.

## 2024-04-19 ENCOUNTER — TELEPHONE (OUTPATIENT)
Dept: GASTROENTEROLOGY | Facility: CLINIC | Age: 59
End: 2024-04-19
Payer: COMMERCIAL

## 2024-04-19 NOTE — TELEPHONE ENCOUNTER
----- Message from LUZ Rene sent at 4/18/2024 10:08 AM EDT -----  Biopsies are c/w gastritis.  Recommend to avoid NSAIDS and continue PPI.  Keep f/u.

## 2024-04-26 ENCOUNTER — TELEPHONE (OUTPATIENT)
Dept: FAMILY MEDICINE CLINIC | Facility: CLINIC | Age: 59
End: 2024-04-26

## 2024-04-26 NOTE — TELEPHONE ENCOUNTER
Caller: Isha Llanes    Relationship: Self    Best call back number: [phone]     What medication are you requesting: ANTIBIOTIC AND DIARRHEA MEDICINE    What are your current symptoms: PATIENT HAVING TROUBLE TALKING BUT HAVING MULTIPLE SYMPTOMS. PATIENT NEEDING MEDICATION.    Have you had these symptoms before:    [x] Yes  [] No    Have you been treated for these symptoms before:   [x] Yes  [] No    If a prescription is needed, what is your preferred pharmacy and phone number: [facility] - [place], [place] - [address] [facility] - [phone] [facility] [phone] FX

## 2024-04-27 ENCOUNTER — HOSPITAL ENCOUNTER (EMERGENCY)
Facility: HOSPITAL | Age: 59
Discharge: HOME | End: 2024-04-27
Attending: EMERGENCY MEDICINE
Payer: COMMERCIAL

## 2024-04-27 ENCOUNTER — APPOINTMENT (OUTPATIENT)
Dept: GENERAL RADIOLOGY | Facility: HOSPITAL | Age: 59
End: 2024-04-27
Payer: COMMERCIAL

## 2024-04-27 VITALS
RESPIRATION RATE: 17 BRPM | DIASTOLIC BLOOD PRESSURE: 67 MMHG | BODY MASS INDEX: 22.99 KG/M2 | TEMPERATURE: 98.2 F | HEIGHT: 66 IN | SYSTOLIC BLOOD PRESSURE: 106 MMHG | OXYGEN SATURATION: 92 % | WEIGHT: 143.08 LBS | HEART RATE: 91 BPM

## 2024-04-27 DIAGNOSIS — R07.9 NONSPECIFIC CHEST PAIN: Primary | ICD-10-CM

## 2024-04-27 LAB
ALBUMIN SERPL-MCNC: 3.8 G/DL (ref 3.5–5.2)
ALBUMIN/GLOB SERPL: 1.6 G/DL
ALP SERPL-CCNC: 57 U/L (ref 39–117)
ALT SERPL W P-5'-P-CCNC: 11 U/L (ref 1–33)
AMPHET+METHAMPHET UR QL: NEGATIVE
ANION GAP SERPL CALCULATED.3IONS-SCNC: 8.6 MMOL/L (ref 5–15)
AST SERPL-CCNC: 15 U/L (ref 1–32)
BARBITURATES UR QL SCN: NEGATIVE
BASOPHILS # BLD AUTO: 0.01 10*3/MM3 (ref 0–0.2)
BASOPHILS NFR BLD AUTO: 0.1 % (ref 0–1.5)
BENZODIAZ UR QL SCN: POSITIVE
BILIRUB SERPL-MCNC: 0.2 MG/DL (ref 0–1.2)
BUN SERPL-MCNC: 14 MG/DL (ref 6–20)
BUN/CREAT SERPL: 26.9 (ref 7–25)
CALCIUM SPEC-SCNC: 8.7 MG/DL (ref 8.6–10.5)
CANNABINOIDS SERPL QL: NEGATIVE
CHLORIDE SERPL-SCNC: 104 MMOL/L (ref 98–107)
CO2 SERPL-SCNC: 30.4 MMOL/L (ref 22–29)
COCAINE UR QL: NEGATIVE
CREAT SERPL-MCNC: 0.52 MG/DL (ref 0.57–1)
D DIMER PPP FEU-MCNC: 0.43 MCGFEU/ML (ref 0–0.58)
DEPRECATED RDW RBC AUTO: 47.5 FL (ref 37–54)
EGFRCR SERPLBLD CKD-EPI 2021: 107.8 ML/MIN/1.73
EOSINOPHIL # BLD AUTO: 0.08 10*3/MM3 (ref 0–0.4)
EOSINOPHIL NFR BLD AUTO: 1 % (ref 0.3–6.2)
ERYTHROCYTE [DISTWIDTH] IN BLOOD BY AUTOMATED COUNT: 14 % (ref 12.3–15.4)
ETHANOL BLD-MCNC: <10 MG/DL (ref 0–10)
ETHANOL UR QL: <0.01 %
FENTANYL UR-MCNC: NEGATIVE NG/ML
FLUAV SUBTYP SPEC NAA+PROBE: NOT DETECTED
FLUBV RNA ISLT QL NAA+PROBE: NOT DETECTED
GEN 5 2HR TROPONIN T REFLEX: 25 NG/L
GLOBULIN UR ELPH-MCNC: 2.4 GM/DL
GLUCOSE SERPL-MCNC: 189 MG/DL (ref 65–99)
HCT VFR BLD AUTO: 40.9 % (ref 34–46.6)
HGB BLD-MCNC: 12.5 G/DL (ref 12–15.9)
HOLD SPECIMEN: NORMAL
HOLD SPECIMEN: NORMAL
IMM GRANULOCYTES # BLD AUTO: 0.03 10*3/MM3 (ref 0–0.05)
IMM GRANULOCYTES NFR BLD AUTO: 0.4 % (ref 0–0.5)
LIPASE SERPL-CCNC: 18 U/L (ref 13–60)
LYMPHOCYTES # BLD AUTO: 0.79 10*3/MM3 (ref 0.7–3.1)
LYMPHOCYTES NFR BLD AUTO: 9.6 % (ref 19.6–45.3)
MAGNESIUM SERPL-MCNC: 1.7 MG/DL (ref 1.6–2.6)
MCH RBC QN AUTO: 28.1 PG (ref 26.6–33)
MCHC RBC AUTO-ENTMCNC: 30.6 G/DL (ref 31.5–35.7)
MCV RBC AUTO: 91.9 FL (ref 79–97)
METHADONE UR QL SCN: NEGATIVE
MONOCYTES # BLD AUTO: 0.47 10*3/MM3 (ref 0.1–0.9)
MONOCYTES NFR BLD AUTO: 5.7 % (ref 5–12)
NEUTROPHILS NFR BLD AUTO: 6.84 10*3/MM3 (ref 1.7–7)
NEUTROPHILS NFR BLD AUTO: 83.2 % (ref 42.7–76)
NRBC BLD AUTO-RTO: 0 /100 WBC (ref 0–0.2)
NT-PROBNP SERPL-MCNC: 270.9 PG/ML (ref 0–900)
OPIATES UR QL: POSITIVE
OXYCODONE UR QL SCN: NEGATIVE
PLATELET # BLD AUTO: 154 10*3/MM3 (ref 140–450)
PMV BLD AUTO: 9.9 FL (ref 6–12)
POTASSIUM SERPL-SCNC: 3.9 MMOL/L (ref 3.5–5.2)
PROCALCITONIN SERPL-MCNC: 0.06 NG/ML (ref 0–0.25)
PROT SERPL-MCNC: 6.2 G/DL (ref 6–8.5)
QT INTERVAL: 315 MS
QT INTERVAL: 366 MS
QTC INTERVAL: 447 MS
QTC INTERVAL: 468 MS
RBC # BLD AUTO: 4.45 10*6/MM3 (ref 3.77–5.28)
RSV RNA NPH QL NAA+NON-PROBE: NOT DETECTED
SARS-COV-2 RNA RESP QL NAA+PROBE: NOT DETECTED
SODIUM SERPL-SCNC: 143 MMOL/L (ref 136–145)
TROPONIN T DELTA: 3 NG/L
TROPONIN T SERPL HS-MCNC: 22 NG/L
WBC NRBC COR # BLD AUTO: 8.22 10*3/MM3 (ref 3.4–10.8)
WHOLE BLOOD HOLD COAG: NORMAL
WHOLE BLOOD HOLD SPECIMEN: NORMAL

## 2024-04-27 PROCEDURE — 85379 FIBRIN DEGRADATION QUANT: CPT | Performed by: EMERGENCY MEDICINE

## 2024-04-27 PROCEDURE — 83735 ASSAY OF MAGNESIUM: CPT

## 2024-04-27 PROCEDURE — 71045 X-RAY EXAM CHEST 1 VIEW: CPT

## 2024-04-27 PROCEDURE — 84145 PROCALCITONIN (PCT): CPT | Performed by: EMERGENCY MEDICINE

## 2024-04-27 PROCEDURE — 80307 DRUG TEST PRSMV CHEM ANLYZR: CPT | Performed by: EMERGENCY MEDICINE

## 2024-04-27 PROCEDURE — 84484 ASSAY OF TROPONIN QUANT: CPT | Performed by: EMERGENCY MEDICINE

## 2024-04-27 PROCEDURE — 93005 ELECTROCARDIOGRAM TRACING: CPT | Performed by: EMERGENCY MEDICINE

## 2024-04-27 PROCEDURE — 36415 COLL VENOUS BLD VENIPUNCTURE: CPT

## 2024-04-27 PROCEDURE — 93010 ELECTROCARDIOGRAM REPORT: CPT | Performed by: INTERNAL MEDICINE

## 2024-04-27 PROCEDURE — 82077 ASSAY SPEC XCP UR&BREATH IA: CPT | Performed by: EMERGENCY MEDICINE

## 2024-04-27 PROCEDURE — 83880 ASSAY OF NATRIURETIC PEPTIDE: CPT

## 2024-04-27 PROCEDURE — 84484 ASSAY OF TROPONIN QUANT: CPT

## 2024-04-27 PROCEDURE — 87040 BLOOD CULTURE FOR BACTERIA: CPT | Performed by: EMERGENCY MEDICINE

## 2024-04-27 PROCEDURE — 85025 COMPLETE CBC W/AUTO DIFF WBC: CPT

## 2024-04-27 PROCEDURE — 87637 SARSCOV2&INF A&B&RSV AMP PRB: CPT | Performed by: EMERGENCY MEDICINE

## 2024-04-27 PROCEDURE — 93005 ELECTROCARDIOGRAM TRACING: CPT

## 2024-04-27 PROCEDURE — 99284 EMERGENCY DEPT VISIT MOD MDM: CPT

## 2024-04-27 PROCEDURE — 83690 ASSAY OF LIPASE: CPT

## 2024-04-27 PROCEDURE — 25810000003 SODIUM CHLORIDE 0.9 % SOLUTION: Performed by: EMERGENCY MEDICINE

## 2024-04-27 PROCEDURE — 80053 COMPREHEN METABOLIC PANEL: CPT

## 2024-04-27 RX ORDER — SODIUM CHLORIDE 0.9 % (FLUSH) 0.9 %
10 SYRINGE (ML) INJECTION AS NEEDED
Status: DISCONTINUED | OUTPATIENT
Start: 2024-04-27 | End: 2024-04-27 | Stop reason: HOSPADM

## 2024-04-27 RX ORDER — ASPIRIN 81 MG/1
324 TABLET, CHEWABLE ORAL ONCE
Status: DISCONTINUED | OUTPATIENT
Start: 2024-04-27 | End: 2024-04-27 | Stop reason: HOSPADM

## 2024-04-27 RX ORDER — NALOXONE HYDROCHLORIDE 1 MG/ML
2 INJECTION INTRAMUSCULAR; INTRAVENOUS; SUBCUTANEOUS ONCE
Status: DISCONTINUED | OUTPATIENT
Start: 2024-04-27 | End: 2024-04-27 | Stop reason: HOSPADM

## 2024-04-27 RX ADMIN — SODIUM CHLORIDE 1000 ML: 9 INJECTION, SOLUTION INTRAVENOUS at 12:59

## 2024-04-27 NOTE — ED PROVIDER NOTES
"Time: 12:13 PM EDT  Date of encounter:  4/27/2024  Independent Historian/Clinical History and Information was obtained by:   Patient    History is limited by: N/A    Chief Complaint: Chest pain, shortness of breath      History of Present Illness:  Patient is a 58 y.o. year old female who presents to the emergency department for evaluation of pain and shortness of breath.  Patient reports sudden onset left-sided chest pain this morning, described as pressure and like \"something was sitting on her chest \".  She also reports associated shortness of breath.  States that this time her chest pain has resolved.  Patient with history of pulmonary embolism, has not been taking Plavix x unknown duration.  Patient uses 3 L of oxygen at home.     HPI    Patient Care Team  Primary Care Provider: Katia Wright MD    Past Medical History:     Allergies   Allergen Reactions    Amoxicillin Shortness Of Breath     Has tolerated Cefazolin, Ceftriaxone, and Cefepime -Jermaine Melida, [facility]    Ceclor [Cefaclor] Shortness Of Breath     Has tolerated Cefazolin, Ceftriaxone, and Cefepime -Jermaine Melida, [facility]      Penicillins Shortness Of Breath     Has tolerated Cefazolin, Ceftriaxone, and Cefepime -Jermaine Melida, [facility]    Sulfa Antibiotics Rash    Valium [Diazepam] Mental Status Change     DEPRESSED    Ambien [Zolpidem] Mental Status Change    Aspirin GI Intolerance    Ativan [Lorazepam] Mental Status Change    Benadryl [Diphenhydramine] Anxiety     AND MAKES HER HYPER    Biaxin [Clarithromycin] Unknown - Low Severity    Cephalexin Unknown - Low Severity    Clindamycin Unknown - Low Severity    Compazine [Prochlorperazine Edisylate] Rash    Contrast Dye (Echo Or Unknown Ct/Mr) Other (See Comments)     Caused pain in her arm    Doxycycline Rash    Nsaids GI Intolerance    Phenergan [Promethazine Hcl] GI Intolerance    Promethazine GI Intolerance    Vancomycin Itching     Past Medical History:   Diagnosis Date    Anesthesia     REPORTS " HAS GOT REAL EMOTIONAL AND HAS BECOME ANGRY BEFORE    Anxiety     Aortic stenosis, severe     HAS BIO VALVE REPLACED    Arthritis     Asthma     Back pain     Blood clotting disorder     Cancer     Cancer associated pain     HODGKINS LYMPHOMA    CHF (congestive heart failure)     Chronic low back pain with sciatica     Chronic pain disorder     COPD (chronic obstructive pulmonary disease)     Coronary artery disease     Diabetes mellitus     TYPE 2    Diabetes mellitus     Dyspnea on effort     GERD (gastroesophageal reflux disease)     H/O lumpectomy     H/O: hysterectomy     Hiatal hernia     History of degenerative disc disease     History of kidney stones     History of pulmonary embolus (PE)     History of transfusion     AS A CHILD NO TRANSFUSION REACTION    History of transfusion     Hodgkin's lymphoma     5/19/23  5 YEARS SINCE LAST CHEMO TX    Hypertension     Immobility     Liver disease     DENIES ANY CURRENT ISSUES    Lupus     Mitral valve prolapse     Nausea vomiting and diarrhea 3/7/2024    Panic disorder     Pelvic pain     Peripheral neuropathy     Personal history of DVT (deep vein thrombosis)     Presence of intrathecal pump     PTSD (post-traumatic stress disorder)     Sacroiliac joint disease     SI (sacroiliac) joint inflammation     Sleep apnea     Venous insufficiency      Past Surgical History:   Procedure Laterality Date    ABDOMINAL SURGERY      BACK SURGERY      SPINAL FUSION     BACK SURGERY      BLADDER REPAIR      CARDIAC CATHETERIZATION N/A 03/06/2019    Procedure: Valvuloplasty;  Surgeon: Wayne Johnson MD;  Location: [facility] CATH INVASIVE LOCATION;  Service: Cardiology    CARDIAC CATHETERIZATION      CARDIAC CATHETERIZATION Left 5/31/2023    Procedure: Cardiac Catheterization/Vascular Study;  Surgeon: Poncho Reid MD;  Location: [facility] CATH INVASIVE LOCATION;  Service: Cardiovascular;  Laterality: Left;    CARDIAC SURGERY      CARDIAC VALVE REPLACEMENT  2021     CHOLECYSTECTOMY      COLONOSCOPY N/A 12/29/2021    Procedure: COLONOSCOPY;  Surgeon: Karine Orlando MD;  Location: [facility] ENDOSCOPY;  Service: Gastroenterology;  Laterality: N/A;  POOR PREP    COLONOSCOPY N/A 04/13/2022    Procedure: COLONOSCOPY WITH POLYPECTOMY;  Surgeon: Karine Orlando MD;  Location: [facility] ENDOSCOPY;  Service: Gastroenterology;  Laterality: N/A;  COLON POLYP, HEMORRHOIDS, POOR PREP    COLONOSCOPY      DIRECT LARYNGOSCOPY, ESOPHAGOSCOPY, BRONCHOSCOPY N/A 5/22/2023    Procedure: DIRECT LARYNGOSCOPY WITH BIOPSIES , ESOPHAGOSCOPY, BRONCHOSCOPY;  Surgeon: Ronnie Mcgarry MD;  Location: [facility] OR OSC;  Service: ENT;  Laterality: N/A;    ENDOSCOPY N/A 12/29/2021    Procedure: ESOPHAGOGASTRODUODENOSCOPY;  Surgeon: Karine Orlando MD;  Location: [facility] ENDOSCOPY;  Service: Gastroenterology;  Laterality: N/A;  ESOPHAGITIS, GASTRITIS, HIATAL HERNIA    ENDOSCOPY      ENDOSCOPY N/A 4/16/2024    Procedure: ESOPHAGOGASTRODUODENOSCOPY WITH BIOPSIES;  Surgeon: Karine Orlando MD;  Location: [facility] ENDOSCOPY;  Service: Gastroenterology;  Laterality: N/A;  ESOPHAGITIS, HIATAL HERNIA    HYSTERECTOMY      LYMPHADENECTOMY      PAIN PUMP INSERTION/REVISION N/A 10/18/2019    Procedure: PAIN PUMP INSERTION [facility] [place];  Surgeon: Horace Rios MD;  Location: [facility];  Service: Pain Management    PAIN PUMP INSERTION/REVISION N/A 11/23/2020    Procedure: pain pump removal;  Surgeon: Horace Rios MD;  Location: [facility];  Service: Pain Management;  Laterality: N/A;    PEG TUBE INSERTION N/A 7/26/2023    Procedure: PERCUTANEOUS ENDOSCOPIC GASTROSTOMY TUBE INSERTION;  Surgeon: Kody Mecrer MD;  Location: [facility] ENDOSCOPY;  Service: General;  Laterality: N/A;  SUCCESSFUL PEG TUBE PLACE PLACEMENT    PORTACATH PLACEMENT      IN LEFT CHEST REPORTS THAT IT IS NOT FUNCTIONING    SKIN BIOPSY      TONSILLECTOMY      TUBAL ABDOMINAL LIGATION      TUMOR  REMOVAL       Family History   Problem Relation Age of Onset    Heart failure Mother     Anxiety disorder Mother     Prostate cancer Father     Depression Sister     Bipolar disorder Sister     Pancreatic cancer Sister     ADD / ADHD Brother     Thyroid cancer Maternal Grandmother     Pancreatic cancer Paternal Grandmother     ADD / ADHD Grandson     ADD / ADHD Granddaughter     ADD / ADHD Nephew     Malig Hyperthermia Neg Hx        Home Medications:  Prior to Admission medications    Medication Sig Start Date End Date Taking? Authorizing Provider   albuterol (PROVENTIL) 2.5 MG/0.5ML nebulizer solution Take 2.5 mg by nebulization Every 4 (Four) Hours As Needed for Wheezing or Shortness of Air. 2/9/24   Virgil Navarro MD   albuterol sulfate  (90 Base) MCG/ACT inhaler Inhale 2 puffs Every 4 (Four) Hours As Needed for Wheezing. 11/20/23   Katia Wright MD   ALPRAZolam (Xanax) 0.5 MG tablet Take 1 tablet by mouth 2 (Two) Times a Day As Needed for Anxiety for up to 60 days. 4/10/24 6/9/24  Katia Wright MD   bacitracin 500 UNIT/GM ointment Apply 1 Application topically to the appropriate area as directed 2 (Two) Times a Day. 4/10/24   Katia Wright MD   cetirizine (zyrTEC) 10 MG tablet Take 1 tablet by mouth Daily. 10/31/23   Nette Jiménez MD   clopidogrel (PLAVIX) 75 MG tablet Take 1 tablet by mouth Daily. 2/21/24   Katia Wright MD   furosemide (Lasix) 40 MG tablet Take 1 tablet by mouth 2 (Two) Times a Day. 4/9/24   Katia Wright MD   glucose blood test strip Use as instructed 2/12/24   Katia Wright MD   HYDROcodone-acetaminophen (NORCO) 5-325 MG per tablet Take 1 tablet by mouth Daily As Needed for Moderate Pain.    Nette Jiménez MD   hydroxychloroquine (PLAQUENIL) 200 MG tablet Take 1 tablet by mouth Daily for 90 days. Indications: Rheumatoid Arthritis 3/11/24 6/9/24  Katia Wright MD   Lancets (OneTouch Delica Plus [id number]) misc  2/6/24   Nette Jiménez MD   megestrol (MEGACE) 20  MG tablet Take 1 tablet by mouth Daily for 90 days. 4/10/24 7/9/24  Katia Wright MD   midodrine (PROAMATINE) 10 MG tablet Take 1 tablet by mouth 3 (Three) Times a Day Before Meals for 30 days. 2/9/24 3/10/24  Virgil Navarro MD   mupirocin (BACTROBAN) 2 % ointment Apply 1 Application topically to the appropriate area as directed 3 (Three) Times a Day. 4/10/24   Katia Wright MD   nicotine polacrilex (COMMIT) 2 MG lozenge Dissolve 1 lozenge in the mouth As Needed for Smoking Cessation. 2/9/24   Virgil Navarro MD   O2 (OXYGEN) Inhale 1 (One) Time.    Provider, MD Nette   ondansetron ODT (ZOFRAN-ODT) 8 MG disintegrating tablet Place 1 tablet on the tongue Every 8 (Eight) Hours As Needed for Nausea or Vomiting. 4/8/24   Sobeida Espinosa APRN   pain patient supplied pump by Intrathecal route Continuous.   Type of Medication: Hydromorphone 15 mg/ml  and Bupivacaine 0.5 mg/ml  Provider:Dr. Boudreaux  Provider number: [phone]  Basal Dose: Hydromorphone 7.518 mg/day Bupivacaine 0.02056 mg/day  ( MAX of Hydromorphone 9.940 mg/ day; Bupivacaine 0.55763 mg /day)  Bolus Dose:Hydromorphone 0.500 mg, Bupivacaine 0.28802 mg  Lockout 3 hours. 1 Bolus per every 3 hours max of 5 bolus per day  Next refill/ Alarm Date- 4/24/24  Pump manufacture:[employer]    Provider, MD Nette   pantoprazole (PROTONIX) 20 MG EC tablet Take 1 tablet by mouth Daily. 4/16/24   Karine Orlando MD   predniSONE (DELTASONE) 10 MG tablet Take 1 tablet by mouth Daily. 3/19/24   Katia Wright MD        Social History:   Social History     Tobacco Use    Smoking status: Every Day     Current packs/day: 1.50     Average packs/day: 1.5 packs/day for 45.3 years (68.0 ttl pk-yrs)     Types: Cigarettes     Start date: 1979    Smokeless tobacco: Never   Vaping Use    Vaping status: Never Used   Substance Use Topics    Alcohol use: Never    Drug use: Never         Review of Systems:  Review of Systems   Constitutional:  Negative for chills and  "fever.   HENT:  Negative for congestion, rhinorrhea and trouble swallowing.    Eyes:  Negative for pain and visual disturbance.   Respiratory:  Positive for shortness of breath. Negative for cough.    Cardiovascular:  Positive for chest pain. Negative for palpitations.   Gastrointestinal:  Negative for abdominal pain, nausea and vomiting.   Endocrine: Negative for polydipsia and polyuria.   Genitourinary:  Negative for dysuria and hematuria.   Musculoskeletal:  Negative for gait problem and neck stiffness.   Skin:  Negative for color change.   Neurological:  Negative for seizures and syncope.   Psychiatric/Behavioral:  Negative for behavioral problems, confusion and hallucinations.         Physical Exam:  /67 (BP Location: Right arm, Patient Position: Sitting)   Pulse 91   Temp 98.2 °F (36.8 °C) (Oral)   Resp 17   Ht 167.6 cm (66\")   Wt 64.9 kg (143 lb 1.3 oz)   SpO2 92%   BMI 23.09 kg/m²     Physical Exam  Constitutional:       Appearance: Normal appearance.   HENT:      Head: Normocephalic and atraumatic.      Nose: Nose normal.      Mouth/Throat:      Mouth: Mucous membranes are moist.   Eyes:      Extraocular Movements: Extraocular movements intact.      Conjunctiva/sclera: Conjunctivae normal.      Pupils: Pupils are equal, round, and reactive to light.   Cardiovascular:      Rate and Rhythm: Regular rhythm. Tachycardia present.      Heart sounds: Normal heart sounds.   Pulmonary:      Effort: Pulmonary effort is normal.      Breath sounds: Normal breath sounds.   Abdominal:      Palpations: Abdomen is soft.   Musculoskeletal:         General: No deformity or signs of injury. Normal range of motion.   Skin:     General: Skin is warm.      Coloration: Skin is not jaundiced.      Comments: Chronic - appearing wounds to the bilateral lower extremities.   Neurological:      General: No focal deficit present.      Mental Status: She is alert and oriented to person, place, and time.      Cranial Nerves: " No cranial nerve deficit.   Psychiatric:         Mood and Affect: Mood normal.         Behavior: Behavior normal.                  Procedures:  Procedures      Medical Decision Making:      Comorbidities that affect care:    Sciatica, chronic pain syndrome, aortic stenosis, other pulmonary embolism without acute cor pulmonale, anxiety disorder, arthritis, asthma, kidney disease, CHF, COPD, type 2 diabetes, depression, GERD, systemic lupus erythematosus, history of Hodgkin's lymphoma, history of larynx cancer, CAD, hepatic steatosis, sepsis, dysphagia, history of acute MI    External Notes reviewed:    Previous Clinic Note: 4/10/2024, family medicine office visit: Anorexia, anxiety, venous stasis ulcer of left and right calf with fat layer exposure without varicose veins, weight loss, BMI less than 16.5      The following orders were placed and all results were independently analyzed by me:  Orders Placed This Encounter   Procedures    COVID-19, FLU A/B, RSV PCR 1 HR TAT - Swab, Nasopharynx    Blood Culture - Blood,    Blood Culture - Blood,    XR Chest 1 View    Sharon Draw    High Sensitivity Troponin T    Comprehensive Metabolic Panel    Lipase    BNP    Magnesium    CBC Auto Differential    High Sensitivity Troponin T 2Hr    D-dimer, Quantitative    Ethanol    Urine Drug Screen - Urine, Clean Catch    Procalcitonin    NPO Diet NPO Type: Strict NPO    Undress & Gown    Continuous Pulse Oximetry    Straight Cath    Oxygen Therapy- Nasal Cannula; Titrate 1-6 LPM Per SpO2; 90 - 95%    ECG 12 Lead ED Triage Standing Order; Chest Pain    ECG 12 Lead ED Triage Standing Order; Chest Pain    Insert Peripheral IV    CBC & Differential    Green Top (Gel)    Lavender Top    Gold Top - SST    Light Blue Top       Medications Given in the Emergency Department:  Medications   sodium chloride 0.9 % flush 10 mL (has no administration in time range)   aspirin chewable tablet 324 mg (0 mg Oral Hold 4/27/24 1205)   Naloxone HCl  (NARCAN) injection 2 mg (2 mg Intravenous Not Given 4/27/24 1311)   sodium chloride 0.9 % bolus 1,000 mL (0 mL Intravenous Stopped 4/27/24 1329)        ED Course:    ED Course as of 04/27/24 1627   Sat Apr 27, 2024   1210 I have personally interpreted the EKG today and it shows no evidence of any acute ischemia or heart arrhythmia.  Left bundle branch block, significant LVH with repolarization changes with no significant interval change from EKG dated 3/7/2024. [RP]   1246 Device (Oxygen Therapy): room air [RP]      ED Course User Index  [RP] Yoel Ahmadi MD       Labs:    Lab Results (last 24 hours)       Procedure Component Value Units Date/Time    High Sensitivity Troponin T [[id number]]  (Abnormal) Collected: 04/27/24 1043    Specimen: Blood from Arm, Right Updated: 04/27/24 1111     HS Troponin T 22 ng/L     Narrative:      High Sensitive Troponin T Reference Range:  <14.0 ng/L- Negative Female for AMI  <22.0 ng/L- Negative Male for AMI  >=14 - Abnormal Female indicating possible myocardial injury.  >=22 - Abnormal Male indicating possible myocardial injury.   Clinicians would have to utilize clinical acumen, EKG, Troponin, and serial changes to determine if it is an Acute Myocardial Infarction or myocardial injury due to an underlying chronic condition.         CBC & Differential [[id number]]  (Abnormal) Collected: 04/27/24 1043    Specimen: Blood from Arm, Right Updated: 04/27/24 1054    Narrative:      The following orders were created for panel order CBC & Differential.  Procedure                               Abnormality         Status                     ---------                               -----------         ------                     CBC Auto Differential[[id number]]        Abnormal            Final result                 Please view results for these tests on the individual orders.    Comprehensive Metabolic Panel [[id number]]  (Abnormal) Collected: 04/27/24 1043    Specimen: Blood from Arm,  Right Updated: 04/27/24 1111     Glucose 189 mg/dL      BUN 14 mg/dL      Creatinine 0.52 mg/dL      Sodium 143 mmol/L      Potassium 3.9 mmol/L      Chloride 104 mmol/L      CO2 30.4 mmol/L      Calcium 8.7 mg/dL      Total Protein 6.2 g/dL      Albumin 3.8 g/dL      ALT (SGPT) 11 U/L      AST (SGOT) 15 U/L      Alkaline Phosphatase 57 U/L      Total Bilirubin 0.2 mg/dL      Globulin 2.4 gm/dL      A/G Ratio 1.6 g/dL      BUN/Creatinine Ratio 26.9     Anion Gap 8.6 mmol/L      eGFR 107.8 mL/min/1.73     Narrative:      GFR Normal >60  Chronic Kidney Disease <60  Kidney Failure <15      Lipase [[id number]]  (Normal) Collected: 04/27/24 1043    Specimen: Blood from Arm, Right Updated: 04/27/24 1111     Lipase 18 U/L     BNP [[id number]]  (Normal) Collected: 04/27/24 1043    Specimen: Blood from Arm, Right Updated: 04/27/24 1110     proBNP 270.9 pg/mL     Narrative:      This assay is used as an aid in the diagnosis of individuals suspected of having heart failure. It can be used as an aid in the diagnosis of acute decompensated heart failure (ADHF) in patients presenting with signs and symptoms of ADHF to the emergency department (ED). In addition, NT-proBNP of <300 pg/mL indicates ADHF is not likely.    Age Range Result Interpretation  NT-proBNP Concentration (pg/mL:      <50             Positive            >450                   Gray                 300-450                    Negative             <300    50-75           Positive            >900                  Gray                300-900                  Negative            <300      >75             Positive            >1800                  Gray                300-1800                  Negative            <300    Magnesium [[id number]]  (Normal) Collected: 04/27/24 1043    Specimen: Blood from Arm, Right Updated: 04/27/24 1111     Magnesium 1.7 mg/dL     CBC Auto Differential [[id number]]  (Abnormal) Collected: 04/27/24 1043    Specimen: Blood from Arm, Right  "Updated: 04/27/24 1054     WBC 8.22 10*3/mm3      RBC 4.45 10*6/mm3      Hemoglobin 12.5 g/dL      Hematocrit 40.9 %      MCV 91.9 fL      MCH 28.1 pg      MCHC 30.6 g/dL      RDW 14.0 %      RDW-SD 47.5 fl      MPV 9.9 fL      Platelets 154 10*3/mm3      Neutrophil % 83.2 %      Lymphocyte % 9.6 %      Monocyte % 5.7 %      Eosinophil % 1.0 %      Basophil % 0.1 %      Immature Grans % 0.4 %      Neutrophils, Absolute 6.84 10*3/mm3      Lymphocytes, Absolute 0.79 10*3/mm3      Monocytes, Absolute 0.47 10*3/mm3      Eosinophils, Absolute 0.08 10*3/mm3      Basophils, Absolute 0.01 10*3/mm3      Immature Grans, Absolute 0.03 10*3/mm3      nRBC 0.0 /100 WBC     D-dimer, Quantitative [[id number]]  (Normal) Collected: 04/27/24 1043    Specimen: Blood from Arm, Right Updated: 04/27/24 1225     D-Dimer, Quantitative 0.43 MCGFEU/mL     Narrative:      According to the assay 's published package insert, a normal (<0.50 MCGFEU/mL) D-dimer result in conjunction with a non-high clinical probability assessment, excludes deep vein thrombosis (DVT) and pulmonary embolism (PE) with high sensitivity.    D-dimer values increase with age and this can make VTE exclusion of an older population difficult. To address this, the [employer] College [employer], based on best available evidence and recent guidelines, recommends that clinicians use age-adjusted D-dimer thresholds in patients greater than 50 years of age with: a) a low probability of PE who do not meet all Pulmonary Embolism Rule Out Criteria, or b) in those with intermediate probability of PE.   The formula for an age-adjusted D-dimer cut-off is \"age/100\".  For example, a 60 year old patient would have an age-adjusted cut-off of 0.60 MCGFEU/mL and an 80 year old 0.80 MCGFEU/mL.    Ethanol [[id number]] Collected: 04/27/24 1043    Specimen: Blood from Arm, Right Updated: 04/27/24 1226     Ethanol <10 mg/dL      Ethanol % <0.010 %     Narrative:      Ethanol " "(Plasma)  <10 Essentially Negative    Toxic Concentrations           mg/dL    Flushing, slowing of reflexes    Impaired visual activity         Depression of CNS              >100  Possible Coma                  >300       Procalcitonin [[id number]]  (Normal) Collected: 04/27/24 1043    Specimen: Blood from Arm, Right Updated: 04/27/24 1238     Procalcitonin 0.06 ng/mL     Narrative:      As a Marker for Sepsis (Non-Neonates):    1. <0.5 ng/mL represents a low risk of severe sepsis and/or septic shock.  2. >2 ng/mL represents a high risk of severe sepsis and/or septic shock.    As a Marker for Lower Respiratory Tract Infections that require antibiotic therapy:    PCT on Admission    Antibiotic Therapy       6-12 Hrs later    >0.5                Strongly Recommended  >0.25 - <0.5        Recommended  0.1 - 0.25          Discouraged              Remeasure/reassess PCT  <0.1                Strongly Discouraged     Remeasure/reassess PCT    As 28 day mortality risk marker: \"Change in Procalcitonin Result\" (>80% or <=80%) if Day 0 (or Day 1) and Day 4 values are available. Refer to http://www.[employer]-pct-calculator.com    Change in PCT <=80%  A decrease of PCT levels below or equal to 80% defines a positive change in PCT test result representing a higher risk for 28-day all-cause mortality of patients diagnosed with severe sepsis for septic shock.    Change in PCT >80%  A decrease of PCT levels of more than 80% defines a negative change in PCT result representing a lower risk for 28-day all-cause mortality of patients diagnosed with severe sepsis or septic shock.    This test is Prognostic not Diagnostic, if elevated correlate with clinical findings before administering antibiotic treatment.        High Sensitivity Troponin T 2Hr [[id number]]  (Abnormal) Collected: 04/27/24 1234    Specimen: Blood from Arm, Left Updated: 04/27/24 1302     HS Troponin T 25 ng/L      Troponin T Delta 3 ng/L     Narrative:      High " Sensitive Troponin T Reference Range:  <14.0 ng/L- Negative Female for AMI  <22.0 ng/L- Negative Male for AMI  >=14 - Abnormal Female indicating possible myocardial injury.  >=22 - Abnormal Male indicating possible myocardial injury.   Clinicians would have to utilize clinical acumen, EKG, Troponin, and serial changes to determine if it is an Acute Myocardial Infarction or myocardial injury due to an underlying chronic condition.         Urine Drug Screen - Urine, Clean Catch [[id number]]  (Abnormal) Collected: 04/27/24 1234    Specimen: Urine, Clean Catch Updated: 04/27/24 1309     Amphet/Methamphet, Screen Negative     Barbiturates Screen, Urine Negative     Benzodiazepine Screen, Urine Positive     Cocaine Screen, Urine Negative     Opiate Screen Positive     THC, Screen, Urine Negative     Methadone Screen, Urine Negative     Oxycodone Screen, Urine Negative     Fentanyl, Urine Negative    Narrative:      Negative Thresholds Per Drugs Screened:    Amphetamines                 500 ng/ml  Barbiturates                 200 ng/ml  Benzodiazepines              100 ng/ml  Cocaine                      300 ng/ml  Methadone                    300 ng/ml  Opiates                      300 ng/ml  Oxycodone                    100 ng/ml  THC                           50 ng/ml  Fentanyl                       5 ng/ml      The Normal Value for all drugs tested is negative. This report includes final unconfirmed screening results to be used for medical treatment purposes only. Unconfirmed results must not be used for non-medical purposes such as employment or legal testing. Clinical consideration should be applied to any drug of abuse test, particularly when unconfirmed results are used.            Blood Culture - Blood, Arm, Left [[id number]] Collected: 04/27/24 1234    Specimen: Blood from Arm, Left Updated: 04/27/24 1240    Blood Culture - Blood, Arm, Right [[id number]] Collected: 04/27/24 1234    Specimen: Blood from Arm, Right  Updated: 04/27/24 1240    COVID-19, FLU A/B, RSV PCR 1 HR TAT - Swab, Nasopharynx [[id number]]  (Normal) Collected: 04/27/24 1236    Specimen: Swab from Nasopharynx Updated: 04/27/24 1406     COVID19 Not Detected     Influenza A PCR Not Detected     Influenza B PCR Not Detected     RSV, PCR Not Detected    Narrative:      Fact sheet for providers: https://www.fda.gov/media/740864/download    Fact sheet for patients: https://www.fda.gov/media/782202/download    Test performed by PCR.             Imaging:    XR Chest 1 View    Result Date: 4/27/2024  XR CHEST 1 VW-  Date of Exam: 4/27/2024 10:44 AM  Indication: Chest Pain Triage Protocol.  Comparison Exams: February 5, 2024  Technique: Single AP chest radiograph  FINDINGS: The lungs are clear. The heart and mediastinal contours appear normal. The pulmonary vasculature appears normal. The osseous structures appear intact. A left internal jugular port has its tip at the low SVC.      No acute cardiopulmonary process identified.   Electronically Signed By-Ryan Farnsworth MD On:4/27/2024 11:32 AM         Differential Diagnosis and Discussion:    Chest Pain:  Based on the patient's signs and symptoms, I considered aortic dissection, myocardial infaction, pulmonary embolism, cardiac tamponade, pericarditis, pneumothorax, musculoskeletal chest pain and other differential diagnosis as an etiology of the patient's chest pain.   Dyspnea: Differential diagnosis includes but is not limited to metabolic acidosis, neurological disorders, psychogenic, asthma, pneumothorax, upper airway obstruction, COPD, pneumonia, noncardiogenic pulmonary edema, interstitial lung disease, anemia, congestive heart failure, and pulmonary embolism    All labs were reviewed and interpreted by me.  All X-rays impressions were independently interpreted by me.  EKG was interpreted by me.    MDM     Amount and/or Complexity of Data Reviewed  Decide to obtain previous medical records or to obtain history  from someone other than the patient: yes                 Patient Care Considerations:    CT CHEST: I considered ordering a CT scan of the chest, however patient is very well-appearing with no tachypnea or hypoxia.  She has a negative D-dimer.  In addition, unfortunately the patient states she has a very severe/anaphylactic allergy to IV contrast.      Consultants/Shared Management Plan:    None    Social Determinants of Health:    Patient is independent, reliable, and has access to care.       Disposition and Care Coordination:    Discharged: The patient is suitable and stable for discharge with no need for consideration of admission.    I have explained the patient´s condition, diagnoses and treatment plan based on the information available to me at this time. I have answered questions and addressed any concerns. The patient has a good  understanding of the patient´s diagnosis, condition, and treatment plan as can be expected at this point. The vital signs have been stable. The patient´s condition is stable and appropriate for discharge from the emergency department.      The patient will pursue further outpatient evaluation with the primary care physician or other designated or consulting physician as outlined in the discharge instructions. They are agreeable to this plan of care and follow-up instructions have been explained in detail. The patient has received these instructions in written format and has expressed an understanding of the discharge instructions. The patient is aware that any significant change in condition or worsening of symptoms should prompt an immediate return to this or the closest emergency department or call to 911.    Final diagnoses:   Nonspecific chest pain        ED Disposition       ED Disposition   Discharge    Condition   Stable    Comment   --               This medical record created using voice recognition software.           Yoel Ahmadi MD  04/27/24 2206

## 2024-04-30 ENCOUNTER — TRANSCRIBE ORDERS (OUTPATIENT)
Dept: ADMINISTRATIVE | Facility: HOSPITAL | Age: 59
End: 2024-04-30
Payer: COMMERCIAL

## 2024-04-30 DIAGNOSIS — Z85.21 HISTORY OF LARYNGEAL CANCER: Primary | ICD-10-CM

## 2024-04-30 LAB
QT INTERVAL: 366 MS
QTC INTERVAL: 468 MS

## 2024-05-01 DIAGNOSIS — F41.9 ANXIETY: ICD-10-CM

## 2024-05-02 LAB
BACTERIA SPEC AEROBE CULT: NORMAL
BACTERIA SPEC AEROBE CULT: NORMAL

## 2024-05-02 RX ORDER — ALPRAZOLAM 0.5 MG/1
0.5 TABLET ORAL 2 TIMES DAILY PRN
Qty: 60 TABLET | Refills: 0 | Status: SHIPPED | OUTPATIENT
Start: 2024-05-02 | End: 2024-07-01

## 2024-05-03 ENCOUNTER — DOCUMENTATION (OUTPATIENT)
Dept: RADIATION ONCOLOGY | Facility: HOSPITAL | Age: 59
End: 2024-05-03
Payer: COMMERCIAL

## 2024-05-03 ENCOUNTER — TELEPHONE (OUTPATIENT)
Dept: RADIATION ONCOLOGY | Facility: HOSPITAL | Age: 59
End: 2024-05-03
Payer: COMMERCIAL

## 2024-05-03 NOTE — TELEPHONE ENCOUNTER
Diagnosis: History of head and neck cancer    Reason for Referral: Transportation      Content of Visit: OSW assistance requested by radiation oncology manager, Anaid REYES, advising that Ms. Llanes needs to follow-up with the radiation oncologist ASAP. She relies on [employer]. She is having difficulty arranging her transportation due to loss of voice. OSW coordinated with radiation oncology in getting Ms. Llanes scheduled to follow-up with Dr. Nunez on Monday, 5/6/24 at 1030 prior to her PET scan at 1300. OSW spoke with Ms. Llanes today and she is agreeable to this plan. OSW contacted [employer] to arrange her transportation needs on Monday. [facility] - confirmation #[id number], [phone], and [id number]. Ms. Llanes also provided her updated phone number and address. OSW updated these in the EMR. OSW support remains available.     Resources/Referrals Provided: Care coordination and [employer]

## 2024-05-06 ENCOUNTER — DOCUMENTATION (OUTPATIENT)
Dept: ONCOLOGY | Facility: HOSPITAL | Age: 59
End: 2024-05-06
Payer: COMMERCIAL

## 2024-05-06 ENCOUNTER — HOSPITAL ENCOUNTER (OUTPATIENT)
Dept: PET IMAGING | Facility: HOSPITAL | Age: 59
Discharge: HOME | End: 2024-05-06
Payer: COMMERCIAL

## 2024-05-06 ENCOUNTER — DOCUMENTATION (OUTPATIENT)
Dept: RADIATION ONCOLOGY | Facility: HOSPITAL | Age: 59
End: 2024-05-06

## 2024-05-06 ENCOUNTER — OFFICE VISIT (OUTPATIENT)
Dept: RADIATION ONCOLOGY | Facility: HOSPITAL | Age: 59
End: 2024-05-06
Payer: COMMERCIAL

## 2024-05-06 VITALS
RESPIRATION RATE: 18 BRPM | DIASTOLIC BLOOD PRESSURE: 65 MMHG | TEMPERATURE: 97.7 F | WEIGHT: 127.9 LBS | HEART RATE: 83 BPM | SYSTOLIC BLOOD PRESSURE: 97 MMHG | OXYGEN SATURATION: 95 % | BODY MASS INDEX: 20.64 KG/M2

## 2024-05-06 DIAGNOSIS — Z85.21 HISTORY OF LARYNGEAL CANCER: ICD-10-CM

## 2024-05-06 DIAGNOSIS — C32.1 MALIGNANT NEOPLASM OF SUPRAGLOTTIS: Primary | ICD-10-CM

## 2024-05-06 PROCEDURE — 78815 PET IMAGE W/CT SKULL-THIGH: CPT

## 2024-05-06 PROCEDURE — A9552 F18 FDG: HCPCS | Performed by: INTERNAL MEDICINE

## 2024-05-06 PROCEDURE — G0463 HOSPITAL OUTPT CLINIC VISIT: HCPCS | Performed by: RADIOLOGY

## 2024-05-06 PROCEDURE — 0 FLUDEOXYGLUCOSE F18 SOLUTION: Performed by: INTERNAL MEDICINE

## 2024-05-06 RX ORDER — HYDROCODONE BITARTRATE AND ACETAMINOPHEN 5; 325 MG/1; MG/1
1 TABLET ORAL EVERY 6 HOURS PRN
Qty: 40 TABLET | Refills: 0 | Status: SHIPPED | OUTPATIENT
Start: 2024-05-06

## 2024-05-06 RX ORDER — PREDNISONE 20 MG/1
20 TABLET ORAL DAILY
Qty: 14 TABLET | Refills: 0 | Status: SHIPPED | OUTPATIENT
Start: 2024-05-06

## 2024-05-06 RX ADMIN — FLUDEOXYGLUCOSE F 18 1 DOSE: 200 INJECTION, SOLUTION INTRAVENOUS at 13:27

## 2024-05-06 NOTE — PROGRESS NOTES
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  She denies any changes in breathing.     Review of Systems: Review of Systems   Constitutional:  Positive for appetite change (Decreased, has an altered taste.) and fatigue (10/10). Negative for unexpected weight change.   HENT:  Positive for congestion, rhinorrhea (Yellow/white/clear), sinus pressure, sinus pain, sore throat (on sides), tinnitus (Occasional, ongoing), trouble swallowing (Occasional, with certain foods, dry foods.) and voice change (hoarseness).         States feels like her ears are dry and itchy.  C/o thick secretions/saliva  Altered taste/smell  States feels like her throat and neck are swollen.     Eyes:  Positive for visual disturbance (Occasional blurred and double vision, ongoing).   Respiratory:  Positive for choking (Noted with dry foods) and shortness of breath (occasional, ongoing). Negative for cough and chest tightness.         States that sometimes she has difficulty catching her breath, states feels like it goes along with her throat.     Cardiovascular:  Negative for chest pain, palpitations and leg swelling.   Gastrointestinal:  Positive for constipation (Educated on stool softeners/fiber.  Last bm yesterday.), diarrhea (Occasional, takes imodium prn) and nausea (Frequent). Negative for abdominal pain, anal bleeding, blood in stool and vomiting.        States that she feels like she has fluid in her abdomen.  States she feels like her abdomen will gurgle from where the tube was removed.     Genitourinary:  Positive for difficulty urinating (occasional hesitancy). Negative for dysuria,  frequency, hematuria and urgency.   Musculoskeletal:  Positive for arthralgias (Right hip and generalized pain), back pain, gait problem (States she has to bear crawl.), neck pain (States has pain in the front of her neck) and neck stiffness (In the front of her neck.).   Skin:  Negative for color change and rash.        Wound (diabetic Ulcer BLE)   Neurological:  Positive for dizziness (Occasional, new), weakness (Generalized, does not have strength) and headaches (occasional). Negative for light-headedness.             Psychiatric/Behavioral:  Positive for sleep disturbance (Due to pain).        The following portions of the patient's history were reviewed and updated as appropriate: allergies, current medications, past family history, past medical history, past social history, past surgical history and problem list.    Medications:     Current Outpatient Medications:     albuterol sulfate  (90 Base) MCG/ACT inhaler, Inhale 2 puffs Every 4 (Four) Hours As Needed for Wheezing., Disp: 18 g, Rfl: 2    ALPRAZolam (Xanax) 0.5 MG tablet, Take 1 tablet by mouth 2 (Two) Times a Day As Needed for Anxiety for up to 60 days., Disp: 60 tablet, Rfl: 0    bacitracin 500 UNIT/GM ointment, Apply 1 Application topically to the appropriate area as directed 2 (Two) Times a Day., Disp: 28 g, Rfl: 5    cetirizine (zyrTEC) 10 MG tablet, Take 1 tablet by mouth Daily., Disp: , Rfl:     clopidogrel (PLAVIX) 75 MG tablet, Take 1 tablet by mouth Daily., Disp: 90 tablet, Rfl: 2    furosemide (Lasix) 40 MG tablet, Take 1 tablet by mouth 2 (Two) Times a Day., Disp: 90 tablet, Rfl: 2    glucose blood test strip, Use as instructed, Disp: 100 each, Rfl: 12    HYDROcodone-acetaminophen (NORCO) 5-325 MG per tablet, Take 1 tablet by mouth Every 6 (Six) Hours As Needed for Moderate Pain., Disp: 40 tablet, Rfl: 0    Lancets (OneTouch Delica Plus [id number]) misc, , Disp: , Rfl:     nicotine polacrilex (COMMIT) 2 MG lozenge, Dissolve 1 lozenge in  the mouth As Needed for Smoking Cessation. (Patient not taking: Reported on 5/9/2024), Disp: 81 each, Rfl: 0    O2 (OXYGEN), Inhale 1 (One) Time. (Patient not taking: Reported on 5/9/2024), Disp: , Rfl:     ondansetron ODT (ZOFRAN-ODT) 8 MG disintegrating tablet, Place 1 tablet on the tongue Every 8 (Eight) Hours As Needed for Nausea or Vomiting., Disp: 30 tablet, Rfl: 1    pain patient supplied pump, by Intrathecal route Continuous.  Type of Medication: Hydromorphone 15 mg/ml  and Bupivacaine 0.5 mg/ml Provider:Dr. Boudreaux Provider number: [phone] Basal Dose: Hydromorphone 7.518 mg/day Bupivacaine 0.58778 mg/day ( MAX of Hydromorphone 9.940 mg/ day; Bupivacaine 0.79872 mg /day) Bolus Dose:Hydromorphone 0.500 mg, Bupivacaine 0.60909 mg Lockout 3 hours. 1 Bolus per every 3 hours max of 5 bolus per day Next refill/ Alarm Date- 4/24/24 Pump manufacture:[employer], Disp: , Rfl:     pantoprazole (PROTONIX) 20 MG EC tablet, Take 1 tablet by mouth Daily., Disp: 30 tablet, Rfl: 1    predniSONE (DELTASONE) 10 MG tablet, Take 1 tablet by mouth Daily., Disp: 30 tablet, Rfl: 3    acetaminophen (Tylenol) 325 MG tablet, Every 4 (Four) Hours., Disp: , Rfl:     albuterol (PROVENTIL) 2.5 MG/0.5ML nebulizer solution, Take 2.5 mg by nebulization Every 4 (Four) Hours As Needed for Wheezing or Shortness of Air., Disp: 20 mL, Rfl: 0    gabapentin (NEURONTIN) 300 MG capsule, 1 capsule. (Patient not taking: Reported on 5/9/2024), Disp: , Rfl:     HYDROmorphone (Dilaudid) 1 MG/ML liquid liquid, Every 6 (Six) Hours., Disp: , Rfl:     insulin degludec (Tresiba FlexTouch) 100 UNIT/ML solution pen-injector injection, as directed Subcutaneous (Patient not taking: Reported on 5/9/2024), Disp: , Rfl:     megestrol (MEGACE) 20 MG tablet, Take 1 tablet by mouth Daily., Disp: , Rfl:     midodrine (PROAMATINE) 10 MG tablet, Take 1 tablet by mouth 3 (Three) Times a Day Before Meals for 30 days., Disp: 90 tablet, Rfl: 0    mupirocin (BACTROBAN) 2 %  ointment, Apply 1 Application topically to the appropriate area as directed 3 (Three) Times a Day., Disp: 30 g, Rfl: 2    predniSONE (DELTASONE) 20 MG tablet, Take 1 tablet by mouth Daily., Disp: 14 tablet, Rfl: 0    Allergies:   Allergies   Allergen Reactions    Amoxicillin Shortness Of Breath     Has tolerated Cefazolin, Ceftriaxone, and Cefepime -Jermaine Melida, RP    Ceclor [Cefaclor] Shortness Of Breath     Has tolerated Cefazolin, Ceftriaxone, and Cefepime -Jermaine Melida, RP      Penicillins Shortness Of Breath     Has tolerated Cefazolin, Ceftriaxone, and Cefepime -Jermaine Melida, RP    Sulfa Antibiotics Rash    Valium [Diazepam] Mental Status Change     DEPRESSED    Ambien [Zolpidem] Mental Status Change    Aspirin GI Intolerance    Ativan [Lorazepam] Mental Status Change    Benadryl [Diphenhydramine] Anxiety     AND MAKES HER HYPER    Biaxin [Clarithromycin] Unknown - Low Severity    Cephalexin Unknown - Low Severity    Clindamycin Unknown - Low Severity    Compazine [Prochlorperazine Edisylate] Rash    Contrast Dye (Echo Or Unknown Ct/Mr) Other (See Comments)     Caused pain in her arm    Doxycycline Rash    Nsaids GI Intolerance    Phenergan [Promethazine Hcl] GI Intolerance    Promethazine GI Intolerance    Vancomycin Itching       ECOG: (2) Ambulatory and capable of self care, unable to carry out work activity, up and about > 50% or waking hours  Quality of Life: 40 - Must Use Wheelchair Most of Time     Objective     Physical Exam:   Vital Signs:   Vitals:    05/06/24 1038   BP: 97/65   Pulse: 83   Resp: 18   Temp: 97.7 °F (36.5 °C)   TempSrc: Temporal   SpO2: 95%   Weight: 58 kg (127 lb 14.4 oz)   PainSc:   8   PainLoc: Back     Body mass index is 20.64 kg/m².     Physical Exam  Constitutional:       General: She is not in acute distress.     Appearance: Normal appearance. She is not toxic-appearing.   HENT:      Head: Normocephalic and atraumatic.   Pulmonary:      Effort: Pulmonary effort  is normal. No respiratory distress.   Neurological:      General: No focal deficit present.      Mental Status: She is alert and oriented to person, place, and time.      Cranial Nerves: No cranial nerve deficit.   Psychiatric:         Mood and Affect: Mood normal.         Behavior: Behavior normal.         Judgment: Judgment normal.         Radiographs: NM PET/CT Skull Base to Mid Thigh    Result Date: 5/7/2024   1. Relatively low-grade increased activity in the left supraglottic region which may be on the basis of posttreatment changes as described. 2. No definite evidence of metastatic disease. 3. Incidental findings as noted above.    Electronically Signed By-Jose G Hidalgo MD On:5/7/2024 11:49 AM      XR Chest 1 View    Result Date: 4/27/2024  No acute cardiopulmonary process identified.   Electronically Signed By-Ryan Farnsworth MD On:4/27/2024 11:32 AM      CT Abdomen Pelvis Without Contrast    Result Date: 3/7/2024    1. Ground-glass opacity left lung base which may represent underlying viral or atypical pneumonia.  Please correlate with Covid 19 status 2. No definite acute abnormality noted. 3. 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  She has involvement of the laryngeal side of the epiglottis.  She is status post external beam radiotherapy, having received 70 Pollack in 35 daily fractions over the course of 66 elapsed days.  ECOG 3      I explained my concern for recurrence/progression of disease given her symptoms in light of her protracted course of radiotherapy due to noncompliance.  I recommended prompt evaluation by Dr. Mcgarry which may require exam under anesthesia given her inability to tolerate today's  nasopharyngoscopy today.  She will return for evaluation in 1 month.    Goyo Nunez MD  Radiation Oncology  [facility]    This document has been signed by Goyo Nunez MD on May 15, 2024 10:53 EDT

## 2024-05-08 ENCOUNTER — TELEPHONE (OUTPATIENT)
Dept: FAMILY MEDICINE CLINIC | Facility: CLINIC | Age: 59
End: 2024-05-08
Payer: COMMERCIAL

## 2024-05-09 ENCOUNTER — OFFICE VISIT (OUTPATIENT)
Dept: OTOLARYNGOLOGY | Facility: CLINIC | Age: 59
End: 2024-05-09
Payer: COMMERCIAL

## 2024-05-09 VITALS
WEIGHT: 115 LBS | DIASTOLIC BLOOD PRESSURE: 59 MMHG | TEMPERATURE: 97.7 F | HEIGHT: 66 IN | BODY MASS INDEX: 18.48 KG/M2 | HEART RATE: 84 BPM | SYSTOLIC BLOOD PRESSURE: 100 MMHG

## 2024-05-09 DIAGNOSIS — C32.9 LARYNGEAL CANCER: ICD-10-CM

## 2024-05-09 DIAGNOSIS — R07.0 THROAT PAIN: ICD-10-CM

## 2024-05-09 DIAGNOSIS — J30.1 SEASONAL ALLERGIC RHINITIS DUE TO POLLEN: ICD-10-CM

## 2024-05-09 DIAGNOSIS — R49.0 VOICE HOARSENESS: Primary | ICD-10-CM

## 2024-05-09 DIAGNOSIS — Z72.0 TOBACCO ABUSE: ICD-10-CM

## 2024-05-09 RX ORDER — HYDROMORPHONE HYDROCHLORIDE 1 MG/ML
SOLUTION ORAL EVERY 6 HOURS SCHEDULED
COMMUNITY

## 2024-05-09 RX ORDER — ACETAMINOPHEN 325 MG/1
TABLET ORAL
COMMUNITY

## 2024-05-09 RX ORDER — INSULIN DEGLUDEC 100 U/ML
INJECTION, SOLUTION SUBCUTANEOUS
COMMUNITY

## 2024-05-09 RX ORDER — GABAPENTIN 300 MG/1
1 CAPSULE ORAL
COMMUNITY

## 2024-05-09 RX ORDER — MEGESTROL ACETATE 20 MG/1
1 TABLET ORAL DAILY
COMMUNITY
Start: 2024-05-06

## 2024-05-14 ENCOUNTER — TELEPHONE (OUTPATIENT)
Dept: GASTROENTEROLOGY | Facility: CLINIC | Age: 59
End: 2024-05-14
Payer: COMMERCIAL

## 2024-05-14 NOTE — TELEPHONE ENCOUNTER
[facility], I'm needing to speak with patient regarding speech therapy referral ordered by LUZ Collins. Ok per YUMIKO to leave voicemail

## 2024-05-23 ENCOUNTER — OFFICE VISIT (OUTPATIENT)
Dept: FAMILY MEDICINE CLINIC | Facility: CLINIC | Age: 59
End: 2024-05-23
Payer: COMMERCIAL

## 2024-05-23 VITALS
HEART RATE: 100 BPM | OXYGEN SATURATION: 97 % | SYSTOLIC BLOOD PRESSURE: 110 MMHG | WEIGHT: 105 LBS | DIASTOLIC BLOOD PRESSURE: 68 MMHG | TEMPERATURE: 98.2 F | BODY MASS INDEX: 16.88 KG/M2 | HEIGHT: 66 IN

## 2024-05-23 DIAGNOSIS — Z00.00 ANNUAL PHYSICAL EXAM: Primary | ICD-10-CM

## 2024-05-23 DIAGNOSIS — L97.222 VENOUS STASIS ULCER OF LEFT CALF WITH FAT LAYER EXPOSED WITHOUT VARICOSE VEINS: ICD-10-CM

## 2024-05-23 DIAGNOSIS — C32.1 MALIGNANT NEOPLASM OF SUPRAGLOTTIS: ICD-10-CM

## 2024-05-23 DIAGNOSIS — N89.8 VAGINAL ODOR: ICD-10-CM

## 2024-05-23 DIAGNOSIS — Z13.220 LIPID SCREENING: ICD-10-CM

## 2024-05-23 DIAGNOSIS — M19.90 OSTEOARTHRITIS, UNSPECIFIED OSTEOARTHRITIS TYPE, UNSPECIFIED SITE: ICD-10-CM

## 2024-05-23 DIAGNOSIS — K21.9 GASTROESOPHAGEAL REFLUX DISEASE WITHOUT ESOPHAGITIS: ICD-10-CM

## 2024-05-23 DIAGNOSIS — G89.29 OTHER CHRONIC PAIN: ICD-10-CM

## 2024-05-23 DIAGNOSIS — I87.2 VENOUS STASIS ULCER OF LEFT CALF WITH FAT LAYER EXPOSED WITHOUT VARICOSE VEINS: ICD-10-CM

## 2024-05-23 DIAGNOSIS — R49.0 HOARSENESS OF VOICE: ICD-10-CM

## 2024-05-23 PROCEDURE — 2014F MENTAL STATUS ASSESS: CPT | Performed by: STUDENT IN AN ORGANIZED HEALTH CARE EDUCATION/TRAINING PROGRAM

## 2024-05-23 PROCEDURE — 1159F MED LIST DOCD IN RCRD: CPT | Performed by: STUDENT IN AN ORGANIZED HEALTH CARE EDUCATION/TRAINING PROGRAM

## 2024-05-23 PROCEDURE — 1160F RVW MEDS BY RX/DR IN RCRD: CPT | Performed by: STUDENT IN AN ORGANIZED HEALTH CARE EDUCATION/TRAINING PROGRAM

## 2024-05-23 PROCEDURE — 1125F AMNT PAIN NOTED PAIN PRSNT: CPT | Performed by: STUDENT IN AN ORGANIZED HEALTH CARE EDUCATION/TRAINING PROGRAM

## 2024-05-23 PROCEDURE — 99396 PREV VISIT EST AGE 40-64: CPT | Performed by: STUDENT IN AN ORGANIZED HEALTH CARE EDUCATION/TRAINING PROGRAM

## 2024-05-23 RX ORDER — PREDNISONE 10 MG/1
10 TABLET ORAL DAILY
Qty: 30 TABLET | Refills: 3 | Status: SHIPPED | OUTPATIENT
Start: 2024-05-23

## 2024-05-23 RX ORDER — FLUCONAZOLE 150 MG/1
150 TABLET ORAL ONCE
Qty: 1 TABLET | Refills: 0 | Status: SHIPPED | OUTPATIENT
Start: 2024-05-23 | End: 2024-05-23

## 2024-05-23 RX ORDER — HYDROCODONE BITARTRATE AND ACETAMINOPHEN 5; 325 MG/1; MG/1
1 TABLET ORAL EVERY 8 HOURS PRN
Qty: 30 TABLET | Refills: 0 | Status: SHIPPED | OUTPATIENT
Start: 2024-05-23 | End: 2024-06-02

## 2024-05-23 RX ORDER — FAMOTIDINE 20 MG/1
20 TABLET, FILM COATED ORAL 2 TIMES DAILY
Qty: 60 TABLET | Refills: 2 | Status: SHIPPED | OUTPATIENT
Start: 2024-05-23 | End: 2024-08-21

## 2024-05-23 NOTE — PROGRESS NOTES
"Chief Complaint  Hoarse (6-8 weeks , hx of cancer ), vaginal odor, and Med Refill (Steroid)    Subjective      History of Present Illness    Isha Llanes is a 58 y.o. female who presents to [facility]   with a past medical history of squamous cell carcinoma of the supraglottic larynx status postradiation, lupus, non-Hodgkin's lymphoma, COPD, CAD, type 2 diabetes mellitus, hypertension, PTSD, aortic stenosis s/p TAVR presents today for annual physical exam.  She has multiple acute complaints patient is supposed to be following with wound care for chronic venous stasis ulceration however she states she does not like going to that office and we will address her concerns and they do not give her enough supplies.  Patient is also in between housing now exactly where she will be living next month.  Complains today of vaginal odor as well as needing refill for her chronic steroids.  Patient continues to maintain her weight she had lost 30 pounds in the past few months she states she just does not have an appetite she declines Ensure at this time.  Last visit we started Megace and also sent her to nutritionist which she did not attend.  Patient is very noncompliant.  Patient complains that she is in extreme pain due to her history of non-Hodgkin's lymphoma and squamous cell carcinoma of the larynx she is currently undergoing treatment with radiotherapy and he filled her medications on May 6 she would like me to refill them for 10 more days she was in tears today.    Objective   Vital Signs:   Vitals:    05/23/24 1105   BP: 110/68   Pulse: 100   Temp: 98.2 °F (36.8 °C)   SpO2: 97%   Weight: 47.6 kg (105 lb)   Height: 167.6 cm (66\")     Body mass index is 16.95 kg/m².    Wt Readings from Last 3 Encounters:   05/23/24 47.6 kg (105 lb)   05/09/24 52.2 kg (115 lb)   05/06/24 58 kg (127 lb 14.4 oz)     BP Readings from Last 3 Encounters:   05/23/24 110/68   05/09/24 100/59   05/06/24 97/65 "       Health Maintenance   Topic Date Due    DIABETIC EYE EXAM  Never done    ZOSTER VACCINE (1 of 2) Never done    ANNUAL PHYSICAL  Never done    PAP SMEAR  03/29/2017    LIPID PANEL  01/12/2024    HEMOGLOBIN A1C  05/29/2024    COVID-19 Vaccine (1) 05/25/2024 (Originally 12/4/1970)    DIABETIC FOOT EXAM  08/29/2024 (Originally 3/29/2017)    Hepatitis B (1 of 3 - 19+ 3-dose series) 02/19/2025 (Originally 12/4/1984)    Pneumococcal Vaccine 0-64 (1 of 2 - PCV) 02/19/2025 (Originally 12/4/1971)    MAMMOGRAM  02/19/2025 (Originally 1/8/2021)    TDAP/TD VACCINES (1 - Tdap) 02/19/2025 (Originally 12/4/1984)    INFLUENZA VACCINE  08/01/2024    LUNG CANCER SCREENING  09/05/2024    URINE MICROALBUMIN  02/19/2025    BMI FOLLOWUP  05/06/2025    COLORECTAL CANCER SCREENING  04/13/2032    HEPATITIS C SCREENING  Completed       Physical Exam    General: Cachectic appearing, She is not in acute distress.appears chronically ill.      Appearance: Normal appearance. She is not toxic-appearing.   HENT:      Head: Normocephalic and atraumatic.   Neck:      Comments: Development of submental lymphedema continues to be present  Pulmonary:      Effort: Pulmonary effort is normal. No respiratory distress.   Neurological:      General: No focal deficit present.      Mental Status: She is alert and oriented to person, place, and time.      Cranial Nerves: No cranial nerve deficit.   Psychiatric:         Mood and Affect: Mood normal.         Behavior: Behavior normal.         Judgment: Judgment normal.      Result Review :     The following data was reviewed by: Katia Wright MD on 05/23/2024:      Procedures          Diagnoses and all orders for this visit:    1. Annual physical exam (Primary)    2. Hoarseness of voice    3. Vaginal odor  -     fluconazole (Diflucan) 150 MG tablet; Take 1 tablet by mouth 1 (One) Time for 1 dose.  Dispense: 1 tablet; Refill: 0    4. Osteoarthritis, unspecified osteoarthritis type, unspecified site  -      predniSONE (DELTASONE) 10 MG tablet; Take 1 tablet by mouth Daily.  Dispense: 30 tablet; Refill: 3    5. Gastroesophageal reflux disease without esophagitis  -     famotidine (Pepcid) 20 MG tablet; Take 1 tablet by mouth 2 (Two) Times a Day for 90 days.  Dispense: 60 tablet; Refill: 2    6. Venous stasis ulcer of left calf with fat layer exposed without varicose veins  -     Ambulatory Referral to Wound Clinic    7. Lipid screening  -     Lipid panel; Future    8. Other chronic pain    9. Malignant neoplasm of supraglottis  -     HYDROcodone-acetaminophen (NORCO) 5-325 MG per tablet; Take 1 tablet by mouth Every 8 (Eight) Hours As Needed for Moderate Pain or Severe Pain for up to 10 days.  Dispense: 30 tablet; Refill: 0       Wound care clinic referral placed  Did start her on Megace however she does not take it I also put in a nutrition referral at the last visit she did not also go to that appointment.       I had and refilled her hydrocortisone every 8 hours for 10 days.  Agreeable to lipid screening today         41 minutes were spent caring for Isha on this date of service. This time spent by me includes preparing for the visit, reviewing tests, obtaining/reviewing separately obtained history, performing medically appropriate exam/evaluation, counseling/educating the patient/family/caregiver, ordering medications/tests/procedures, referring/communicating with other health care professionals, documenting information in the medical record, independently interpreting results and communicating that with the patient/family/caregiver and/or care coordination. FOLLOW UP  No follow-ups on file.  Patient was given instructions and counseling regarding her condition or for health maintenance advice. Please see specific information pulled into the AVS if appropriate.       Katia Wright MD  05/23/24  11:29 EDT    CURRENT & DISCONTINUED MEDICATIONS  Current Outpatient Medications   Medication Instructions     acetaminophen (Tylenol) 325 MG tablet Every 4 Hours Scheduled    albuterol (PROVENTIL) 2.5 mg, Nebulization, Every 4 Hours PRN    albuterol sulfate  (90 Base) MCG/ACT inhaler 2 puffs, Inhalation, Every 4 Hours PRN    ALPRAZolam (XANAX) 0.5 mg, Oral, 2 Times Daily PRN    bacitracin 0.9 g, Topical, 2 Times Daily    cetirizine (zyrTEC) 10 MG tablet 1 tablet, Oral, Daily    clopidogrel (PLAVIX) 75 mg, Oral, Daily    famotidine (PEPCID) 20 mg, Oral, 2 Times Daily    fluconazole (DIFLUCAN) 150 mg, Oral, Once    furosemide (LASIX) 40 mg, Oral, 2 Times Daily    gabapentin (NEURONTIN) 300 MG capsule 1 capsule    glucose blood test strip Use as instructed    HYDROcodone-acetaminophen (NORCO) 5-325 MG per tablet 1 tablet, Oral, Every 8 Hours PRN    HYDROmorphone (Dilaudid) 1 MG/ML liquid liquid Every 6 Hours Scheduled    insulin degludec (Tresiba FlexTouch) 100 UNIT/ML solution pen-injector injection     Lancets (OneTouch Delica Plus [id number]) misc     megestrol (MEGACE) 20 MG tablet 1 tablet, Oral, Daily    midodrine (PROAMATINE) 10 mg, Oral, 3 Times Daily Before Meals    mupirocin (BACTROBAN) 2 % ointment 1 Application, Topical, 3 Times Daily    nicotine polacrilex (COMMIT) 2 mg, Mouth/Throat, As Needed    O2 (OXYGEN) Inhalation, Once    ondansetron ODT (ZOFRAN-ODT) 8 mg, Translingual, Every 8 Hours PRN    pain patient supplied pump Intrathecal, Continuous, <BR>Type of Medication: Hydromorphone 15 mg/ml  and Bupivacaine 0.5 mg/ml<BR>Provider:Dr. Boudreaux<BR>Provider number: [phone]<BR>Basal Dose: Hydromorphone 7.518 mg/day Bupivacaine 0.31173 mg/day<BR>( MAX of Hydromorphone 9.940 mg/ day; Bupivacaine 0.53374 mg /day)<BR>Bolus Dose:Hydromorphone 0.500 mg, Bupivacaine 0.87965 mg<BR>Lockout 3 hours. 1 Bolus per every 3 hours max of 5 bolus per day<BR>Next refill/ Alarm Date- 4/24/24<BR>Pump manufacture:[employer]<BR><BR><BR>     predniSONE (DELTASONE) 10 mg, Oral, Daily       Medications Discontinued During This  Encounter   Medication Reason    predniSONE (DELTASONE) 20 MG tablet     predniSONE (DELTASONE) 10 MG tablet Reorder    pantoprazole (PROTONIX) 20 MG EC tablet     HYDROcodone-acetaminophen (NORCO) 5-325 MG per tablet Reorder

## 2024-05-29 ENCOUNTER — HOSPITAL ENCOUNTER (EMERGENCY)
Facility: HOSPITAL | Age: 59
Discharge: HOME | End: 2024-05-30
Attending: EMERGENCY MEDICINE
Payer: COMMERCIAL

## 2024-05-29 DIAGNOSIS — E86.0 DEHYDRATION: Primary | ICD-10-CM

## 2024-05-29 DIAGNOSIS — K52.9 GASTROENTERITIS: ICD-10-CM

## 2024-05-29 LAB
ALBUMIN SERPL-MCNC: 3.9 G/DL (ref 3.5–5.2)
ALBUMIN/GLOB SERPL: 1.2 G/DL
ALP SERPL-CCNC: 55 U/L (ref 39–117)
ALT SERPL W P-5'-P-CCNC: 12 U/L (ref 1–33)
ANION GAP SERPL CALCULATED.3IONS-SCNC: 8.9 MMOL/L (ref 5–15)
AST SERPL-CCNC: 18 U/L (ref 1–32)
BASOPHILS # BLD AUTO: 0.02 10*3/MM3 (ref 0–0.2)
BASOPHILS NFR BLD AUTO: 0.3 % (ref 0–1.5)
BILIRUB SERPL-MCNC: 0.3 MG/DL (ref 0–1.2)
BUN SERPL-MCNC: 14 MG/DL (ref 6–20)
BUN/CREAT SERPL: 27.5 (ref 7–25)
CALCIUM SPEC-SCNC: 8.9 MG/DL (ref 8.6–10.5)
CHLORIDE SERPL-SCNC: 97 MMOL/L (ref 98–107)
CO2 SERPL-SCNC: 33.1 MMOL/L (ref 22–29)
CREAT SERPL-MCNC: 0.51 MG/DL (ref 0.57–1)
D-LACTATE SERPL-SCNC: 0.9 MMOL/L (ref 0.5–2)
DEPRECATED RDW RBC AUTO: 46.4 FL (ref 37–54)
EGFRCR SERPLBLD CKD-EPI 2021: 108.4 ML/MIN/1.73
EOSINOPHIL # BLD AUTO: 0.06 10*3/MM3 (ref 0–0.4)
EOSINOPHIL NFR BLD AUTO: 0.8 % (ref 0.3–6.2)
ERYTHROCYTE [DISTWIDTH] IN BLOOD BY AUTOMATED COUNT: 13.9 % (ref 12.3–15.4)
GLOBULIN UR ELPH-MCNC: 3.2 GM/DL
GLUCOSE SERPL-MCNC: 95 MG/DL (ref 65–99)
HCT VFR BLD AUTO: 46.2 % (ref 34–46.6)
HGB BLD-MCNC: 14.4 G/DL (ref 12–15.9)
HOLD SPECIMEN: NORMAL
HOLD SPECIMEN: NORMAL
IMM GRANULOCYTES # BLD AUTO: 0.02 10*3/MM3 (ref 0–0.05)
IMM GRANULOCYTES NFR BLD AUTO: 0.3 % (ref 0–0.5)
LIPASE SERPL-CCNC: 81 U/L (ref 13–60)
LYMPHOCYTES # BLD AUTO: 0.68 10*3/MM3 (ref 0.7–3.1)
LYMPHOCYTES NFR BLD AUTO: 9.6 % (ref 19.6–45.3)
MCH RBC QN AUTO: 28.4 PG (ref 26.6–33)
MCHC RBC AUTO-ENTMCNC: 31.2 G/DL (ref 31.5–35.7)
MCV RBC AUTO: 91.1 FL (ref 79–97)
MONOCYTES # BLD AUTO: 0.5 10*3/MM3 (ref 0.1–0.9)
MONOCYTES NFR BLD AUTO: 7 % (ref 5–12)
NEUTROPHILS NFR BLD AUTO: 5.82 10*3/MM3 (ref 1.7–7)
NEUTROPHILS NFR BLD AUTO: 82 % (ref 42.7–76)
NRBC BLD AUTO-RTO: 0 /100 WBC (ref 0–0.2)
PLATELET # BLD AUTO: 144 10*3/MM3 (ref 140–450)
PMV BLD AUTO: 10.8 FL (ref 6–12)
POTASSIUM SERPL-SCNC: 3.7 MMOL/L (ref 3.5–5.2)
PROT SERPL-MCNC: 7.1 G/DL (ref 6–8.5)
RBC # BLD AUTO: 5.07 10*6/MM3 (ref 3.77–5.28)
SODIUM SERPL-SCNC: 139 MMOL/L (ref 136–145)
WBC NRBC COR # BLD AUTO: 7.1 10*3/MM3 (ref 3.4–10.8)
WHOLE BLOOD HOLD COAG: NORMAL
WHOLE BLOOD HOLD SPECIMEN: NORMAL

## 2024-05-29 PROCEDURE — 80053 COMPREHEN METABOLIC PANEL: CPT | Performed by: EMERGENCY MEDICINE

## 2024-05-29 PROCEDURE — 83605 ASSAY OF LACTIC ACID: CPT | Performed by: EMERGENCY MEDICINE

## 2024-05-29 PROCEDURE — 85025 COMPLETE CBC W/AUTO DIFF WBC: CPT

## 2024-05-29 PROCEDURE — 99283 EMERGENCY DEPT VISIT LOW MDM: CPT

## 2024-05-29 PROCEDURE — 83690 ASSAY OF LIPASE: CPT | Performed by: EMERGENCY MEDICINE

## 2024-05-29 PROCEDURE — 36415 COLL VENOUS BLD VENIPUNCTURE: CPT

## 2024-05-29 RX ORDER — SODIUM CHLORIDE 0.9 % (FLUSH) 0.9 %
10 SYRINGE (ML) INJECTION AS NEEDED
Status: DISCONTINUED | OUTPATIENT
Start: 2024-05-29 | End: 2024-05-30 | Stop reason: HOSPADM

## 2024-05-30 VITALS
BODY MASS INDEX: 22.04 KG/M2 | DIASTOLIC BLOOD PRESSURE: 72 MMHG | HEART RATE: 78 BPM | TEMPERATURE: 97.9 F | RESPIRATION RATE: 16 BRPM | SYSTOLIC BLOOD PRESSURE: 103 MMHG | HEIGHT: 66 IN | WEIGHT: 137.13 LBS | OXYGEN SATURATION: 99 %

## 2024-05-30 LAB
ARTERIAL PATENCY WRIST A: ABNORMAL
BASE EXCESS BLDA CALC-SCNC: 6.5 MMOL/L (ref -2–2)
BDY SITE: ABNORMAL
COHGB MFR BLD: 8.3 % (ref 0–1.5)
FHHB: 24.9 % (ref 0–5)
GAS FLOW AIRWAY: 2 LPM
HCO3 BLDA-SCNC: 33.7 MMOL/L (ref 22–26)
HGB BLDA-MCNC: 14 G/DL (ref 11.7–14.6)
INHALED O2 CONCENTRATION: 28 %
LACTATE BLDA-SCNC: ABNORMAL MMOL/L
METHGB BLD QL: 0.3 % (ref 0–1.5)
MODALITY: ABNORMAL
NOTE: ABNORMAL
OXYHGB MFR BLDV: 66.5 % (ref 94–99)
PCO2 BLDA: 59.4 MM HG (ref 35–45)
PH BLDA: 7.37 PH UNITS (ref 7.35–7.45)
PO2 BLD: <145 MM[HG] (ref 0–500)
PO2 BLDA: <40.5 MM HG (ref 80–100)
SAO2 % BLDCOA: 72.8 % (ref 95–99)

## 2024-05-30 PROCEDURE — 83050 HGB METHEMOGLOBIN QUAN: CPT | Performed by: EMERGENCY MEDICINE

## 2024-05-30 PROCEDURE — 25810000003 SODIUM CHLORIDE 0.9 % SOLUTION: Performed by: EMERGENCY MEDICINE

## 2024-05-30 PROCEDURE — 82375 ASSAY CARBOXYHB QUANT: CPT | Performed by: EMERGENCY MEDICINE

## 2024-05-30 PROCEDURE — 25010000002 HEPARIN LOCK FLUSH PER 10 UNITS: Performed by: EMERGENCY MEDICINE

## 2024-05-30 PROCEDURE — 82805 BLOOD GASES W/O2 SATURATION: CPT | Performed by: EMERGENCY MEDICINE

## 2024-05-30 PROCEDURE — 36600 WITHDRAWAL OF ARTERIAL BLOOD: CPT | Performed by: EMERGENCY MEDICINE

## 2024-05-30 RX ORDER — HEPARIN SODIUM (PORCINE) LOCK FLUSH IV SOLN 100 UNIT/ML 100 UNIT/ML
500 SOLUTION INTRAVENOUS ONCE
Status: COMPLETED | OUTPATIENT
Start: 2024-05-30 | End: 2024-05-30

## 2024-05-30 RX ORDER — ONDANSETRON 4 MG/1
4 TABLET, ORALLY DISINTEGRATING ORAL EVERY 8 HOURS PRN
Qty: 14 TABLET | Refills: 0 | Status: SHIPPED | OUTPATIENT
Start: 2024-05-30

## 2024-05-30 RX ADMIN — SODIUM CHLORIDE 1000 ML: 9 INJECTION, SOLUTION INTRAVENOUS at 01:28

## 2024-05-30 RX ADMIN — HEPARIN 500 UNITS: 100 SYRINGE at 02:48

## 2024-05-30 NOTE — ED PROVIDER NOTES
Time: 2:26 AM EDT  Date of encounter:  5/29/2024  Independent Historian/Clinical History and Information was obtained by:   Patient    History is limited by: N/A    Chief Complaint: Generalized weakness      History of Present Illness:  Patient is a 58 y.o. year old female who presents to the emergency department for evaluation of generalized weakness due to dehydration from nausea vomiting diarrhea.    HPI    Patient Care Team  Primary Care Provider: Katia Wright MD    Past Medical History:     Allergies   Allergen Reactions    Amoxicillin Shortness Of Breath     Has tolerated Cefazolin, Ceftriaxone, and Cefepime -Jermaine Melida, RPH    Ceclor [Cefaclor] Shortness Of Breath     Has tolerated Cefazolin, Ceftriaxone, and Cefepime -Jermaine Melida, RP      Penicillins Shortness Of Breath     Has tolerated Cefazolin, Ceftriaxone, and Cefepime -Jermaine Melida, [facility]    Sulfa Antibiotics Rash    Valium [Diazepam] Mental Status Change     DEPRESSED    Ambien [Zolpidem] Mental Status Change    Aspirin GI Intolerance    Ativan [Lorazepam] Mental Status Change    Benadryl [Diphenhydramine] Anxiety     AND MAKES HER HYPER    Biaxin [Clarithromycin] Unknown - Low Severity    Cephalexin Unknown - Low Severity    Clindamycin Unknown - Low Severity    Compazine [Prochlorperazine Edisylate] Rash    Contrast Dye (Echo Or Unknown Ct/Mr) Other (See Comments)     Caused pain in her arm    Doxycycline Rash    Nsaids GI Intolerance    Phenergan [Promethazine Hcl] GI Intolerance    Promethazine GI Intolerance    Vancomycin Itching     Past Medical History:   Diagnosis Date    Anesthesia     REPORTS HAS GOT REAL EMOTIONAL AND HAS BECOME ANGRY BEFORE    Anxiety     Aortic stenosis, severe     HAS BIO VALVE REPLACED    Arthritis     Asthma     Back pain     Blood clotting disorder     Cancer     Cancer associated pain     HODGKINS LYMPHOMA    CHF (congestive heart failure)     Chronic low back pain with sciatica     Chronic pain  disorder     COPD (chronic obstructive pulmonary disease)     Coronary artery disease     Diabetes mellitus     TYPE 2    Diabetes mellitus     Dyspnea on effort     GERD (gastroesophageal reflux disease)     H/O lumpectomy     H/O: hysterectomy     Hiatal hernia     History of degenerative disc disease     History of kidney stones     History of pulmonary embolus (PE)     History of transfusion     AS A CHILD NO TRANSFUSION REACTION    History of transfusion     Hodgkin's lymphoma     5/19/23  5 YEARS SINCE LAST CHEMO TX    Hypertension     Immobility     Liver disease     DENIES ANY CURRENT ISSUES    Lupus     Mitral valve prolapse     Nausea vomiting and diarrhea 3/7/2024    Panic disorder     Pelvic pain     Peripheral neuropathy     Personal history of DVT (deep vein thrombosis)     Presence of intrathecal pump     PTSD (post-traumatic stress disorder)     Sacroiliac joint disease     SI (sacroiliac) joint inflammation     Sleep apnea     Venous insufficiency      Past Surgical History:   Procedure Laterality Date    ABDOMINAL SURGERY      BACK SURGERY      SPINAL FUSION     BACK SURGERY      BLADDER REPAIR      CARDIAC CATHETERIZATION N/A 03/06/2019    Procedure: Valvuloplasty;  Surgeon: Wayne Johnson MD;  Location: [facility] INVASIVE LOCATION;  Service: Cardiology    CARDIAC CATHETERIZATION      CARDIAC CATHETERIZATION Left 5/31/2023    Procedure: Cardiac Catheterization/Vascular Study;  Surgeon: Poncho Reid MD;  Location: [facility] CATH INVASIVE LOCATION;  Service: Cardiovascular;  Laterality: Left;    CARDIAC SURGERY      CARDIAC VALVE REPLACEMENT  2021    CHOLECYSTECTOMY      COLONOSCOPY N/A 12/29/2021    Procedure: COLONOSCOPY;  Surgeon: Karine Orlando MD;  Location: [facility] ENDOSCOPY;  Service: Gastroenterology;  Laterality: N/A;  POOR PREP    COLONOSCOPY N/A 04/13/2022    Procedure: COLONOSCOPY WITH POLYPECTOMY;  Surgeon: Karine Orlando MD;  Location: [facility]  ENDOSCOPY;  Service: Gastroenterology;  Laterality: N/A;  COLON POLYP, HEMORRHOIDS, POOR PREP    COLONOSCOPY      DIRECT LARYNGOSCOPY, ESOPHAGOSCOPY, BRONCHOSCOPY N/A 5/22/2023    Procedure: DIRECT LARYNGOSCOPY WITH BIOPSIES , ESOPHAGOSCOPY, BRONCHOSCOPY;  Surgeon: Ronnie Mcgarry MD;  Location: [facility] OR OSC;  Service: ENT;  Laterality: N/A;    ENDOSCOPY N/A 12/29/2021    Procedure: ESOPHAGOGASTRODUODENOSCOPY;  Surgeon: Karine Orlando MD;  Location: [facility] ENDOSCOPY;  Service: Gastroenterology;  Laterality: N/A;  ESOPHAGITIS, GASTRITIS, HIATAL HERNIA    ENDOSCOPY      ENDOSCOPY N/A 4/16/2024    Procedure: ESOPHAGOGASTRODUODENOSCOPY WITH BIOPSIES;  Surgeon: Karine Orlando MD;  Location: [facility] ENDOSCOPY;  Service: Gastroenterology;  Laterality: N/A;  ESOPHAGITIS, HIATAL HERNIA    HYSTERECTOMY      LYMPHADENECTOMY      PAIN PUMP INSERTION/REVISION N/A 10/18/2019    Procedure: PAIN PUMP INSERTION [facility] 10-18-19 [place];  Surgeon: Horace Rios MD;  Location: [facility];  Service: Pain Management    PAIN PUMP INSERTION/REVISION N/A 11/23/2020    Procedure: pain pump removal;  Surgeon: Horace Rios MD;  Location: [facility];  Service: Pain Management;  Laterality: N/A;    PEG TUBE INSERTION N/A 7/26/2023    Procedure: PERCUTANEOUS ENDOSCOPIC GASTROSTOMY TUBE INSERTION;  Surgeon: Kody Mercer MD;  Location: [facility] ENDOSCOPY;  Service: General;  Laterality: N/A;  SUCCESSFUL PEG TUBE PLACE PLACEMENT    PORTACATH PLACEMENT      IN LEFT CHEST REPORTS THAT IT IS NOT FUNCTIONING    SKIN BIOPSY      TONSILLECTOMY      TUBAL ABDOMINAL LIGATION      TUMOR REMOVAL       Family History   Problem Relation Age of Onset    Heart failure Mother     Anxiety disorder Mother     Prostate cancer Father     Depression Sister     Bipolar disorder Sister     Pancreatic cancer Sister     ADD / ADHD Brother     Thyroid cancer Maternal Grandmother     Pancreatic cancer Paternal Grandmother     ADD  / ADHD Grandson     ADD / ADHD Granddaughter     ADD / ADHD Nephew     [place] Hyperthermia Neg Hx        Home Medications:  Prior to Admission medications    Medication Sig Start Date End Date Taking? Authorizing Provider   acetaminophen (Tylenol) 325 MG tablet Every 4 (Four) Hours.    Nette Jiménez MD   albuterol (PROVENTIL) 2.5 MG/0.5ML nebulizer solution Take 2.5 mg by nebulization Every 4 (Four) Hours As Needed for Wheezing or Shortness of Air. 2/9/24   Virgil Navarro MD   albuterol sulfate  (90 Base) MCG/ACT inhaler Inhale 2 puffs Every 4 (Four) Hours As Needed for Wheezing. 11/20/23   Katia Wright MD   ALPRAZolam (Xanax) 0.5 MG tablet Take 1 tablet by mouth 2 (Two) Times a Day As Needed for Anxiety for up to 60 days. 5/2/24 7/1/24  Katia Wright MD   bacitracin 500 UNIT/GM ointment Apply 1 Application topically to the appropriate area as directed 2 (Two) Times a Day. 4/10/24   Katia Wright MD   cetirizine (zyrTEC) 10 MG tablet Take 1 tablet by mouth Daily. 10/31/23   Nette Jiménez MD   clopidogrel (PLAVIX) 75 MG tablet Take 1 tablet by mouth Daily. 2/21/24   Katia Wright MD   famotidine (Pepcid) 20 MG tablet Take 1 tablet by mouth 2 (Two) Times a Day for 90 days. 5/23/24 8/21/24  Katia Wright MD   furosemide (Lasix) 40 MG tablet Take 1 tablet by mouth 2 (Two) Times a Day. 4/9/24   Katia Wright MD   glucose blood test strip Use as instructed 2/12/24   Katia Wright MD   HYDROcodone-acetaminophen (NORCO) 5-325 MG per tablet Take 1 tablet by mouth Every 8 (Eight) Hours As Needed for Moderate Pain or Severe Pain for up to 10 days. 5/23/24 6/2/24  Katia Wright MD   HYDROmorphone (Dilaudid) 1 MG/ML liquid liquid Every 6 (Six) Hours.    Nette Jiménez MD   insulin degludec (Tresiba FlexTouch) 100 UNIT/ML solution pen-injector injection     Provider, MD Nette   Lancets (OneTouch Delica Plus [id number]) misc  2/6/24   Provider, MD Nette   megestrol (MEGACE) 20 MG tablet  Take 1 tablet by mouth Daily. 5/6/24   Nette Jiménez MD   midodrine (PROAMATINE) 10 MG tablet Take 1 tablet by mouth 3 (Three) Times a Day Before Meals for 30 days. 2/9/24 3/10/24  Virgil Navarro MD   mupirocin (BACTROBAN) 2 % ointment Apply 1 Application topically to the appropriate area as directed 3 (Three) Times a Day. 4/10/24   Katia Wright MD   nicotine polacrilex (COMMIT) 2 MG lozenge Dissolve 1 lozenge in the mouth As Needed for Smoking Cessation. 2/9/24   Virgil Navarro MD   O2 (OXYGEN) Inhale 1 (One) Time.    Nette Jiménez MD   ondansetron ODT (ZOFRAN-ODT) 4 MG disintegrating tablet Place 1 tablet on the tongue Every 8 (Eight) Hours As Needed for Nausea or Vomiting. 5/30/24   Sebastian Gunn MD   ondansetron ODT (ZOFRAN-ODT) 8 MG disintegrating tablet Place 1 tablet on the tongue Every 8 (Eight) Hours As Needed for Nausea or Vomiting. 4/8/24   Sobeida Espinosa APRN   pain patient supplied pump by Intrathecal route Continuous.   Type of Medication: Hydromorphone 15 mg/ml  and Bupivacaine 0.5 mg/ml  Provider:Dr. Boudreaux  Provider number: [phone]  Basal Dose: Hydromorphone 7.518 mg/day Bupivacaine 0.36256 mg/day  ( MAX of Hydromorphone 9.940 mg/ day; Bupivacaine 0.79701 mg /day)  Bolus Dose:Hydromorphone 0.500 mg, Bupivacaine 0.69902 mg  Lockout 3 hours. 1 Bolus per every 3 hours max of 5 bolus per day  Next refill/ Alarm Date- 4/24/24  Pump manufacture:Medtronic    Nette Jiménez MD   predniSONE (DELTASONE) 10 MG tablet Take 1 tablet by mouth Daily. 5/23/24   Katia Wright MD        Social History:   Social History     Tobacco Use    Smoking status: Every Day     Current packs/day: 1.50     Average packs/day: 1.5 packs/day for 45.4 years (68.1 ttl pk-yrs)     Types: Cigarettes     Start date: 1979     Passive exposure: Current    Smokeless tobacco: Never   Vaping Use    Vaping status: Never Used   Substance Use Topics    Alcohol use: Never    Drug use: Never         Review of  "Systems:  Review of Systems   Constitutional:  Negative for chills and fever.   HENT:  Negative for congestion, rhinorrhea and sore throat.    Eyes:  Negative for pain and visual disturbance.   Respiratory:  Negative for apnea, cough, chest tightness and shortness of breath.    Cardiovascular:  Negative for chest pain and palpitations.   Gastrointestinal:  Positive for diarrhea, nausea and vomiting. Negative for abdominal pain.   Genitourinary:  Negative for difficulty urinating and dysuria.   Musculoskeletal:  Negative for joint swelling and myalgias.   Skin:  Negative for color change.   Neurological:  Negative for seizures and headaches.   Psychiatric/Behavioral: Negative.     All other systems reviewed and are negative.       Physical Exam:  BP 91/63 (BP Location: Left arm, Patient Position: Lying)   Pulse 84   Temp 97.9 °F (36.6 °C) (Oral)   Resp 18   Ht 167.6 cm (66\")   Wt 62.2 kg (137 lb 2 oz)   SpO2 98%   BMI 22.13 kg/m²     Physical Exam  Vitals and nursing note reviewed.   Constitutional:       General: She is not in acute distress.     Appearance: Normal appearance. She is not toxic-appearing.   HENT:      Head: Normocephalic and atraumatic.      Jaw: There is normal jaw occlusion.      Mouth/Throat:      Mouth: Mucous membranes are dry.   Eyes:      General: Lids are normal.      Extraocular Movements: Extraocular movements intact.      Conjunctiva/sclera: Conjunctivae normal.      Pupils: Pupils are equal, round, and reactive to light.   Cardiovascular:      Rate and Rhythm: Normal rate and regular rhythm.      Pulses: Normal pulses.      Heart sounds: Normal heart sounds.   Pulmonary:      Effort: Pulmonary effort is normal. No respiratory distress.      Breath sounds: Normal breath sounds. No wheezing or rhonchi.   Abdominal:      General: Abdomen is flat.      Palpations: Abdomen is soft.      Tenderness: There is no abdominal tenderness. There is no guarding or rebound.   Musculoskeletal:    "      General: Normal range of motion.      Cervical back: Normal range of motion and neck supple.      Right lower leg: No edema.      Left lower leg: No edema.   Skin:     General: Skin is warm and dry.   Neurological:      Mental Status: She is alert and oriented to person, place, and time. Mental status is at baseline.   Psychiatric:         Mood and Affect: Mood normal.                  Procedures:  Procedures      Medical Decision Making:      Comorbidities that affect care:    Cancer, tobacco use    External Notes reviewed:    Previous Clinic Note: Family medicine office visit for general medical management      The following orders were placed and all results were independently analyzed by me:  Orders Placed This Encounter   Procedures    [facility] Draw    Comprehensive Metabolic Panel    Lipase    Urinalysis With Microscopic If Indicated (No Culture) - Urine, Clean Catch    Lactic Acid, Plasma    CBC Auto Differential    Blood Gas, Arterial -With Co-Ox Panel: Yes    NPO Diet NPO Type: Strict NPO    Undress & Gown    Insert Peripheral IV    CBC & Differential    Green Top (Gel)    Lavender Top    Gold Top - SST    Light Blue Top       Medications Given in the Emergency Department:  Medications   sodium chloride 0.9 % flush 10 mL (has no administration in time range)   sodium chloride 0.9 % bolus 1,000 mL (1,000 mL Intravenous New Bag 5/30/24 0128)        ED Course:         Labs:    Lab Results (last 24 hours)       Procedure Component Value Units Date/Time    CBC & Differential [[id number]]  (Abnormal) Collected: 05/29/24 2309    Specimen: Blood Updated: 05/29/24 2316    Narrative:      The following orders were created for panel order CBC & Differential.  Procedure                               Abnormality         Status                     ---------                               -----------         ------                     CBC Auto Differential[[id number]]        Abnormal            Final result                  Please view results for these tests on the individual orders.    Comprehensive Metabolic Panel [[id number]]  (Abnormal) Collected: 05/29/24 2309    Specimen: Blood Updated: 05/29/24 2332     Glucose 95 mg/dL      BUN 14 mg/dL      Creatinine 0.51 mg/dL      Sodium 139 mmol/L      Potassium 3.7 mmol/L      Comment: Slight hemolysis detected by analyzer. Result may be falsely elevated.        Chloride 97 mmol/L      CO2 33.1 mmol/L      Calcium 8.9 mg/dL      Total Protein 7.1 g/dL      Albumin 3.9 g/dL      ALT (SGPT) 12 U/L      AST (SGOT) 18 U/L      Comment: Slight hemolysis detected by analyzer. Result may be falsely elevated.        Alkaline Phosphatase 55 U/L      Total Bilirubin 0.3 mg/dL      Globulin 3.2 gm/dL      A/G Ratio 1.2 g/dL      BUN/Creatinine Ratio 27.5     Anion Gap 8.9 mmol/L      eGFR 108.4 mL/min/1.73     Narrative:      GFR Normal >60  Chronic Kidney Disease <60  Kidney Failure <15      Lipase [[id number]]  (Abnormal) Collected: 05/29/24 2309    Specimen: Blood Updated: 05/29/24 2332     Lipase 81 U/L     Lactic Acid, Plasma [[id number]]  (Normal) Collected: 05/29/24 2309    Specimen: Blood Updated: 05/29/24 2328     Lactate 0.9 mmol/L     CBC Auto Differential [[id number]]  (Abnormal) Collected: 05/29/24 2309    Specimen: Blood Updated: 05/29/24 2316     WBC 7.10 10*3/mm3      RBC 5.07 10*6/mm3      Hemoglobin 14.4 g/dL      Hematocrit 46.2 %      MCV 91.1 fL      MCH 28.4 pg      MCHC 31.2 g/dL      RDW 13.9 %      RDW-SD 46.4 fl      MPV 10.8 fL      Platelets 144 10*3/mm3      Neutrophil % 82.0 %      Lymphocyte % 9.6 %      Monocyte % 7.0 %      Eosinophil % 0.8 %      Basophil % 0.3 %      Immature Grans % 0.3 %      Neutrophils, Absolute 5.82 10*3/mm3      Lymphocytes, Absolute 0.68 10*3/mm3      Monocytes, Absolute 0.50 10*3/mm3      Eosinophils, Absolute 0.06 10*3/mm3      Basophils, Absolute 0.02 10*3/mm3      Immature Grans, Absolute 0.02 10*3/mm3      nRBC 0.0 /100 WBC     Blood  Gas, Arterial -With Co-Ox Panel: Yes [[id number]]  (Abnormal) Collected: 05/30/24 0122    Specimen: Arterial Blood from Arm, Right Updated: 05/30/24 0126     pH, Arterial 7.372 pH units      pCO2, Arterial 59.4 mm Hg      pO2, Arterial <40.5 mm Hg      HCO3, Arterial 33.7 mmol/L      Base Excess, Arterial 6.5 mmol/L      O2 Saturation, Arterial 72.8 %      Hemoglobin, Blood Gas 14.0 g/dL      Carboxyhemoglobin 8.3 %      Methemoglobin 0.30 %      Oxyhemoglobin 66.5 %      FHHB 24.9 %      Site Arterial: right brachial     Modality Cannula     FIO2 28 %      Flow Rate 2 lpm      PO2/FIO2 <145     Shane's Test N/A     Note venous gas     Lactate, Arterial --             Imaging:    No Radiology Exams Resulted Within Past 24 Hours      Differential Diagnosis and Discussion:    Diarrhea: Differential diagnosis includes but is not limited to malabsorption syndrome, bacterial infection, carcinoid syndrome, pancreatic hypersecretion, viral infection, celiac sprue, Crohn's disease, ulcerative colitis, ischemic colitis, colitis, hypermotility, and irritable bowel syndrome.  Vomiting: Differential diagnosis includes but is not limited to migraine, labyrinthine disorders, psychogenic, metabolic and endocrine causes, peptic ulcer, gastric outlet obstruction, gastritis, gastroenteritis, appendicitis, intestinal obstruction, paralytic ileus, food poisoning, cholecystitis, acute hepatitis, acute pancreatitis, acute febrile illness, and myocardial infarction.  Weakness: Based on the patient's history, signs, and symptoms, the diffential diagnosis includes but is not limited to meningitis, stroke, sepsis, subarachnoid hemorrhage, intracranial bleeding, encephalitis, acute uti, dehydration, MS, myasthenia gravis, Guillan [place], migraine variant, neuromuscular disorders vertigo, electrolyte imbalance, and metabolic disorders.    All labs were reviewed and interpreted by me.    MDM  Number of Diagnoses or Management  Options  Dehydration  Gastroenteritis  Diagnosis management comments: In summary this is a 58-year-old female who presents to the emergency department for evaluation of dehydration due to nausea, vomiting, diarrhea.  She has received IV fluids in the emerged department with improvement of her symptoms.  CBC independently reviewed by me and shows no critical abnormalities.  CMP independently reviewed by me and shows no critical abnormalities.  Venous blood gas shows acceptable carbon dioxide, no concern for CO2 narcosis at this time.  Prescription for Zofran given.  Very strict return to ER and follow-up instructions have been provided to the patient.         Amount and/or Complexity of Data Reviewed  Decide to obtain previous medical records or to obtain history from someone other than the patient: yes                 Patient Care Considerations:    CT ABDOMEN AND PELVIS: I considered ordering a CT scan of the abdomen and pelvis however benign abdominal examination      Consultants/Shared Management Plan:    None    Social Determinants of Health:    Patient is independent, reliable, and has access to care.       Disposition and Care Coordination:    Discharged: The patient is suitable and stable for discharge with no need for consideration of admission.    I have explained the patient´s condition, diagnoses and treatment plan based on the information available to me at this time. I have answered questions and addressed any concerns. The patient has a good  understanding of the patient´s diagnosis, condition, and treatment plan as can be expected at this point. The vital signs have been stable. The patient´s condition is stable and appropriate for discharge from the emergency department.      The patient will pursue further outpatient evaluation with the primary care physician or other designated or consulting physician as outlined in the discharge instructions. They are agreeable to this plan of care and follow-up  instructions have been explained in detail. The patient has received these instructions in written format and has expressed an understanding of the discharge instructions. The patient is aware that any significant change in condition or worsening of symptoms should prompt an immediate return to this or the closest emergency department or call to 911.  I have explained discharge medications and the need for follow up with the patient/caretakers. This was also printed in the discharge instructions. Patient was discharged with the following medications and follow up:      Medication List        Changed      * ondansetron ODT 8 MG disintegrating tablet  Commonly known as: ZOFRAN-ODT  Place 1 tablet on the tongue Every 8 (Eight) Hours As Needed for Nausea or Vomiting.  What changed: Another medication with the same name was added. Make sure you understand how and when to take each.     * ondansetron ODT 4 MG disintegrating tablet  Commonly known as: ZOFRAN-ODT  Place 1 tablet on the tongue Every 8 (Eight) Hours As Needed for Nausea or Vomiting.  What changed: You were already taking a medication with the same name, and this prescription was added. Make sure you understand how and when to take each.           * This list has 2 medication(s) that are the same as other medications prescribed for you. Read the directions carefully, and ask your doctor or other care provider to review them with you.                   Where to Get Your Medications        These medications were sent to [facility] - [place], [place] - [address] - [phone]  - [phone]  [address] [facility] [address]      Phone: [phone]   ondansetron ODT 4 MG disintegrating tablet      Katia Wright MD  [address]  [address]  [facility] [address]  [phone]    In 1 week         Final diagnoses:   Dehydration   Gastroenteritis        ED Disposition       ED Disposition   Discharge    Condition   Stable     Comment   --               This medical record created using voice recognition software.             Sebastian Gunn MD  05/30/24 5485

## 2024-05-30 NOTE — ED NOTES
Pt comes to the ER for nausea and vomiting for a week. Pt has throat cancer and has been weaker than normal. EMS states the pt told them that she fell yesterday and today.

## 2024-05-30 NOTE — ED NOTES
Left message at mobile number for patient to return call.     Pt was dropping down to 85% on room air while sleeping, applied 2 liters nasal cannula and tolerated well. Pt is 96% on 2 liters nasal cannula.

## 2024-06-04 DIAGNOSIS — F41.9 ANXIETY: ICD-10-CM

## 2024-06-04 NOTE — TELEPHONE ENCOUNTER
Caller: ShraddhaIsha    Relationship: Self    Best call back number: [phone]   Requested Prescriptions:   Requested Prescriptions     Pending Prescriptions Disp Refills    ALPRAZolam (Xanax) 0.5 MG tablet 60 tablet 0     Sig: Take 1 tablet by mouth 2 (Two) Times a Day As Needed for Anxiety for up to 60 days.        Pharmacy where request should be sent: [facility] - [place], [place] - [address] [facility] [phone] [facility] [phone]      Last office visit with prescribing clinician: 5/23/2024   Last telemedicine visit with prescribing clinician: 3/5/2024   Next office visit with prescribing clinician: 6/6/2024     Additional details provided by patient: LESS THAN THREE DAY SUPPLY ON HAND.     Does the patient have less than a 3 day supply:  [x] Yes  [] No    Would you like a call back once the refill request has been completed: [x] Yes [] No    If the office needs to give you a call back, can they leave a voicemail: [] Yes [x] No    Maximilian Estrada   06/04/24 16:03 EDT

## 2024-06-05 RX ORDER — ALPRAZOLAM 0.5 MG/1
0.5 TABLET ORAL 2 TIMES DAILY PRN
Qty: 60 TABLET | Refills: 0 | Status: SHIPPED | OUTPATIENT
Start: 2024-06-05 | End: 2024-08-04

## 2024-06-05 NOTE — TELEPHONE ENCOUNTER
Caller: Isha Llanes    Relationship to patient: Self    Best call back number: [phone]     Patient is needing: PATIENT CALLED AGAIN TO CHECK THE STATUS OF MEDICATION REFILL.

## 2024-06-05 NOTE — TELEPHONE ENCOUNTER
PATIENT CALLED TO CHECK STATUS OF THIS REQUEST.  SHE NEEDS THIS SENT AS SOON AS POSSIBLE.  SHE HAS OTHER MEDICATIONS SHE IS PICKING UP AND WANTS TO GET THEM ALL AT THE SAME TIMEPLEASE CONTACT WHEN SENT

## 2024-06-06 ENCOUNTER — OFFICE VISIT (OUTPATIENT)
Dept: FAMILY MEDICINE CLINIC | Facility: CLINIC | Age: 59
End: 2024-06-06
Payer: COMMERCIAL

## 2024-06-06 VITALS
WEIGHT: 105.9 LBS | TEMPERATURE: 97.5 F | HEART RATE: 94 BPM | DIASTOLIC BLOOD PRESSURE: 82 MMHG | SYSTOLIC BLOOD PRESSURE: 138 MMHG | BODY MASS INDEX: 17.02 KG/M2 | OXYGEN SATURATION: 92 % | HEIGHT: 66 IN

## 2024-06-06 DIAGNOSIS — I87.2 VENOUS STASIS ULCER OF LEFT CALF WITH FAT LAYER EXPOSED WITHOUT VARICOSE VEINS: ICD-10-CM

## 2024-06-06 DIAGNOSIS — E11.9 TYPE 2 DIABETES MELLITUS WITHOUT COMPLICATION, WITHOUT LONG-TERM CURRENT USE OF INSULIN: ICD-10-CM

## 2024-06-06 DIAGNOSIS — L97.222 VENOUS STASIS ULCER OF LEFT CALF WITH FAT LAYER EXPOSED WITHOUT VARICOSE VEINS: ICD-10-CM

## 2024-06-06 DIAGNOSIS — R19.7 DIARRHEA, UNSPECIFIED TYPE: Primary | ICD-10-CM

## 2024-06-06 DIAGNOSIS — Z12.2 ENCOUNTER FOR SCREENING FOR LUNG CANCER: ICD-10-CM

## 2024-06-06 PROCEDURE — 1159F MED LIST DOCD IN RCRD: CPT | Performed by: STUDENT IN AN ORGANIZED HEALTH CARE EDUCATION/TRAINING PROGRAM

## 2024-06-06 PROCEDURE — 99214 OFFICE O/P EST MOD 30 MIN: CPT | Performed by: STUDENT IN AN ORGANIZED HEALTH CARE EDUCATION/TRAINING PROGRAM

## 2024-06-06 PROCEDURE — 1125F AMNT PAIN NOTED PAIN PRSNT: CPT | Performed by: STUDENT IN AN ORGANIZED HEALTH CARE EDUCATION/TRAINING PROGRAM

## 2024-06-06 PROCEDURE — 1160F RVW MEDS BY RX/DR IN RCRD: CPT | Performed by: STUDENT IN AN ORGANIZED HEALTH CARE EDUCATION/TRAINING PROGRAM

## 2024-06-06 RX ORDER — GABAPENTIN 300 MG/1
CAPSULE ORAL
COMMUNITY
Start: 2024-06-06

## 2024-06-06 RX ORDER — FLUCONAZOLE 150 MG/1
150 TABLET ORAL
COMMUNITY
Start: 2024-05-23

## 2024-06-06 NOTE — PROGRESS NOTES
"Chief Complaint  Fall (Has fallen 2x in the last week. )    Subjective      History of Present Illness    Isha Llanes is a 58 y.o. female who presents to 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  She has multiple acute complaints patient is supposed to be following with wound care for chronic venous stasis ulceration however she states she does not like going to that office and we will address her concerns and they do not give her enough supplies.  Patient is also in between housing now exactly where she will be living next month.  Complains today of Last visit we started Megace and also sent her to nutritionist which she did not attend.  Patient is very noncompliant.        Objective   Vital Signs:   Vitals:    06/06/24 1049   BP: 138/82   BP Location: Right arm   Patient Position: Sitting   Cuff Size: Adult   Pulse: 94   Temp: 97.5 °F (36.4 °C)   TempSrc: Temporal   SpO2: 92%   Weight: 48 kg (105 lb 14.4 oz)   Height: 167.6 cm (66\")     Body mass index is 17.09 kg/m².    Wt Readings from Last 3 Encounters:   06/06/24 48 kg (105 lb 14.4 oz)   05/29/24 62.2 kg (137 lb 2 oz)   05/23/24 47.6 kg (105 lb)     BP Readings from Last 3 Encounters:   06/06/24 138/82   05/30/24 103/72   05/23/24 110/68       Health Maintenance   Topic Date Due    COVID-19 Vaccine (1) Never done    DIABETIC EYE EXAM  Never done    ZOSTER VACCINE (1 of 2) Never done    PAP SMEAR  03/29/2017    LIPID PANEL  01/12/2024    HEMOGLOBIN A1C  05/29/2024    LUNG CANCER SCREENING  09/05/2024    DIABETIC FOOT EXAM  08/29/2024 (Originally 3/29/2017)    Hepatitis B (1 of 3 - 19+ 3-dose series) 02/19/2025 (Originally 12/4/1984)    Pneumococcal Vaccine 0-64 (1 of 2 - PCV) 02/19/2025 (Originally 12/4/1971)    MAMMOGRAM  02/19/2025 " (Originally 1/8/2021)    TDAP/TD VACCINES (1 - Tdap) 02/19/2025 (Originally 12/4/1984)    INFLUENZA VACCINE  08/01/2024    URINE MICROALBUMIN  02/19/2025    BMI FOLLOWUP  05/06/2025    ANNUAL PHYSICAL  05/23/2025    COLORECTAL CANCER SCREENING  04/13/2032    HEPATITIS C SCREENING  Completed       Physical Exam    General: Cachectic appearing, She is not in acute distress.appears chronically ill.      Appearance: Normal appearance. She is not toxic-appearing.   HENT:      Head: Normocephalic and atraumatic.   Neck:      Comments: Development of submental lymphedema continues to be present  Pulmonary:      Effort: Pulmonary effort is normal. No respiratory distress.   Neurological:      General: No focal deficit present.      Mental Status: She is alert and oriented to person, place, and time.      Cranial Nerves: No cranial nerve deficit.   Psychiatric:         Mood and Affect: Mood normal.         Behavior: Behavior normal.         Judgment: Judgment normal.   Result Review :     The following data was reviewed by: Katia Wright MD on 06/06/2024:      Procedures          Diagnoses and all orders for this visit:    1. Diarrhea, unspecified type (Primary)    2. Venous stasis ulcer of left calf with fat layer exposed without varicose veins  -     Ambulatory Referral to Wound Clinic  -     Ambulatory Referral to Home Health    3. Type 2 diabetes mellitus without complication, without long-term current use of insulin  -     Ambulatory Referral for Diabetic Eye Exam-Ophthalmology  -     Ambulatory Referral to Home Health    4. Encounter for screening for lung cancer  -     CT Chest Low Dose Wo; Future       Number nurse apply bandages to bilateral lower extremity chronic venous stasis ulcers referral placed for home health and wound clear clinic.            FOLLOW UP  No follow-ups on file.  Patient was given instructions and counseling regarding her condition or for health maintenance advice. Please see specific  information pulled into the AVS if appropriate.       Katia Wright MD  06/06/24  15:14 EDT    CURRENT & DISCONTINUED MEDICATIONS  Current Outpatient Medications   Medication Instructions    acetaminophen (Tylenol) 325 MG tablet Every 4 Hours Scheduled    albuterol (PROVENTIL) 2.5 mg, Nebulization, Every 4 Hours PRN    albuterol sulfate  (90 Base) MCG/ACT inhaler 2 puffs, Inhalation, Every 4 Hours PRN    ALPRAZolam (XANAX) 0.5 mg, Oral, 2 Times Daily PRN    bacitracin 0.9 g, Topical, 2 Times Daily    cetirizine (zyrTEC) 10 MG tablet 1 tablet, Oral, Daily    clopidogrel (PLAVIX) 75 mg, Oral, Daily    famotidine (PEPCID) 20 mg, Oral, 2 Times Daily    fluconazole (DIFLUCAN) 150 mg, Oral    furosemide (LASIX) 40 mg, Oral, 2 Times Daily    gabapentin (NEURONTIN) 300 MG capsule     glucose blood test strip Use as instructed    HYDROmorphone (Dilaudid) 1 MG/ML liquid liquid Every 6 Hours Scheduled    insulin degludec (Tresiba FlexTouch) 100 UNIT/ML solution pen-injector injection     Lancets (OneTouch Delica Plus [id number]) misc     megestrol (MEGACE) 20 MG tablet 1 tablet, Oral, Daily    midodrine (PROAMATINE) 10 mg, Oral, 3 Times Daily Before Meals    mupirocin (BACTROBAN) 2 % ointment 1 Application, Topical, 3 Times Daily    nicotine polacrilex (COMMIT) 2 mg, Mouth/Throat, As Needed    O2 (OXYGEN) Inhalation, Once    ondansetron ODT (ZOFRAN-ODT) 8 mg, Translingual, Every 8 Hours PRN    ondansetron ODT (ZOFRAN-ODT) 4 mg, Translingual, Every 8 Hours PRN    pain patient supplied pump Intrathecal, Continuous, <BR>Type of Medication: Hydromorphone 15 mg/ml  and Bupivacaine 0.5 mg/ml<BR>Provider:Dr. Boudreaux<BR>Provider number: [phone]<BR>Basal Dose: Hydromorphone 7.518 mg/day Bupivacaine 0.93210 mg/day<BR>( MAX of Hydromorphone 9.940 mg/ day; Bupivacaine 0.26095 mg /day)<BR>Bolus Dose:Hydromorphone 0.500 mg, Bupivacaine 0.20236 mg<BR>Lockout 3 hours. 1 Bolus per every 3 hours max of 5 bolus per day<BR>Next refill/  Alarm Date- 4/24/24<BR>Pump manufacture:[employer]<BR><BR><BR>     predniSONE (DELTASONE) 10 mg, Oral, Daily       There are no discontinued medications.

## 2024-06-11 ENCOUNTER — TELEPHONE (OUTPATIENT)
Dept: FAMILY MEDICINE CLINIC | Facility: CLINIC | Age: 59
End: 2024-06-11
Payer: COMMERCIAL

## 2024-06-11 NOTE — TELEPHONE ENCOUNTER
Caller: Isha Llanes    Relationship to patient: Self    Best call back number: [phone]    Patient is needing: PATIENT CALLED IN AND SAID SHE HAS MISPLACED PRESCRIPTION FOR     predniSONE (DELTASONE) 10 MG tablet   PATIENT IS REQUESTING A REFILL AND IS CONCERNED IF INSURANCE WILL COVER.      [facility] - [place], [place] - [address] - [phone]  - [phone]

## 2024-06-12 ENCOUNTER — HOSPITAL ENCOUNTER (INPATIENT)
Facility: HOSPITAL | Age: 59
LOS: 1 days | Discharge: HOME | DRG: 189 | End: 2024-06-14
Attending: EMERGENCY MEDICINE | Admitting: INTERNAL MEDICINE
Payer: COMMERCIAL

## 2024-06-12 ENCOUNTER — APPOINTMENT (OUTPATIENT)
Dept: GENERAL RADIOLOGY | Facility: HOSPITAL | Age: 59
DRG: 189 | End: 2024-06-12
Payer: COMMERCIAL

## 2024-06-12 DIAGNOSIS — R13.12 OROPHARYNGEAL DYSPHAGIA: ICD-10-CM

## 2024-06-12 DIAGNOSIS — R53.83 LETHARGY: Primary | ICD-10-CM

## 2024-06-12 DIAGNOSIS — I50.9 CONGESTIVE HEART FAILURE, UNSPECIFIED HF CHRONICITY, UNSPECIFIED HEART FAILURE TYPE: ICD-10-CM

## 2024-06-12 DIAGNOSIS — G89.4 CHRONIC PAIN SYNDROME: ICD-10-CM

## 2024-06-12 DIAGNOSIS — M19.90 OSTEOARTHRITIS, UNSPECIFIED OSTEOARTHRITIS TYPE, UNSPECIFIED SITE: ICD-10-CM

## 2024-06-12 LAB
BACTERIA UR QL AUTO: NORMAL /HPF
BASOPHILS # BLD AUTO: 0.01 10*3/MM3 (ref 0–0.2)
BASOPHILS NFR BLD AUTO: 0.3 % (ref 0–1.5)
BILIRUB UR QL STRIP: ABNORMAL
CLARITY UR: CLEAR
COLOR UR: ABNORMAL
D-LACTATE SERPL-SCNC: 0.9 MMOL/L (ref 0.5–2)
DEPRECATED RDW RBC AUTO: 43.8 FL (ref 37–54)
EOSINOPHIL # BLD AUTO: 0.05 10*3/MM3 (ref 0–0.4)
EOSINOPHIL NFR BLD AUTO: 1.3 % (ref 0.3–6.2)
ERYTHROCYTE [DISTWIDTH] IN BLOOD BY AUTOMATED COUNT: 13.2 % (ref 12.3–15.4)
GLUCOSE UR STRIP-MCNC: NEGATIVE MG/DL
HCT VFR BLD AUTO: 42.8 % (ref 34–46.6)
HGB BLD-MCNC: 13.4 G/DL (ref 12–15.9)
HGB UR QL STRIP.AUTO: NEGATIVE
HOLD SPECIMEN: NORMAL
HOLD SPECIMEN: NORMAL
HYALINE CASTS UR QL AUTO: NORMAL /LPF
IMM GRANULOCYTES # BLD AUTO: 0.02 10*3/MM3 (ref 0–0.05)
IMM GRANULOCYTES NFR BLD AUTO: 0.5 % (ref 0–0.5)
KETONES UR QL STRIP: ABNORMAL
LEUKOCYTE ESTERASE UR QL STRIP.AUTO: ABNORMAL
LYMPHOCYTES # BLD AUTO: 0.66 10*3/MM3 (ref 0.7–3.1)
LYMPHOCYTES NFR BLD AUTO: 16.8 % (ref 19.6–45.3)
MCH RBC QN AUTO: 28.2 PG (ref 26.6–33)
MCHC RBC AUTO-ENTMCNC: 31.3 G/DL (ref 31.5–35.7)
MCV RBC AUTO: 89.9 FL (ref 79–97)
MONOCYTES # BLD AUTO: 0.42 10*3/MM3 (ref 0.1–0.9)
MONOCYTES NFR BLD AUTO: 10.7 % (ref 5–12)
NEUTROPHILS NFR BLD AUTO: 2.77 10*3/MM3 (ref 1.7–7)
NEUTROPHILS NFR BLD AUTO: 70.4 % (ref 42.7–76)
NITRITE UR QL STRIP: NEGATIVE
NRBC BLD AUTO-RTO: 0 /100 WBC (ref 0–0.2)
PH UR STRIP.AUTO: 5.5 [PH] (ref 5–8)
PLATELET # BLD AUTO: 132 10*3/MM3 (ref 140–450)
PMV BLD AUTO: 10.1 FL (ref 6–12)
PROT UR QL STRIP: ABNORMAL
RBC # BLD AUTO: 4.76 10*6/MM3 (ref 3.77–5.28)
RBC # UR STRIP: NORMAL /HPF
REF LAB TEST METHOD: NORMAL
SP GR UR STRIP: 1.02 (ref 1–1.03)
SQUAMOUS #/AREA URNS HPF: NORMAL /HPF
UROBILINOGEN UR QL STRIP: ABNORMAL
WBC # UR STRIP: NORMAL /HPF
WBC NRBC COR # BLD AUTO: 3.93 10*3/MM3 (ref 3.4–10.8)
WHOLE BLOOD HOLD COAG: NORMAL
WHOLE BLOOD HOLD SPECIMEN: NORMAL

## 2024-06-12 PROCEDURE — 93005 ELECTROCARDIOGRAM TRACING: CPT | Performed by: EMERGENCY MEDICINE

## 2024-06-12 PROCEDURE — 83605 ASSAY OF LACTIC ACID: CPT | Performed by: EMERGENCY MEDICINE

## 2024-06-12 PROCEDURE — 99285 EMERGENCY DEPT VISIT HI MDM: CPT

## 2024-06-12 PROCEDURE — 71045 X-RAY EXAM CHEST 1 VIEW: CPT

## 2024-06-12 PROCEDURE — 85025 COMPLETE CBC W/AUTO DIFF WBC: CPT | Performed by: EMERGENCY MEDICINE

## 2024-06-12 PROCEDURE — 80053 COMPREHEN METABOLIC PANEL: CPT | Performed by: EMERGENCY MEDICINE

## 2024-06-12 PROCEDURE — 87637 SARSCOV2&INF A&B&RSV AMP PRB: CPT | Performed by: EMERGENCY MEDICINE

## 2024-06-12 PROCEDURE — 81001 URINALYSIS AUTO W/SCOPE: CPT | Performed by: EMERGENCY MEDICINE

## 2024-06-12 PROCEDURE — 84484 ASSAY OF TROPONIN QUANT: CPT | Performed by: EMERGENCY MEDICINE

## 2024-06-12 PROCEDURE — 83880 ASSAY OF NATRIURETIC PEPTIDE: CPT | Performed by: EMERGENCY MEDICINE

## 2024-06-12 PROCEDURE — 36415 COLL VENOUS BLD VENIPUNCTURE: CPT

## 2024-06-12 PROCEDURE — 87040 BLOOD CULTURE FOR BACTERIA: CPT | Performed by: EMERGENCY MEDICINE

## 2024-06-12 PROCEDURE — 82803 BLOOD GASES ANY COMBINATION: CPT

## 2024-06-12 PROCEDURE — 83735 ASSAY OF MAGNESIUM: CPT | Performed by: EMERGENCY MEDICINE

## 2024-06-12 RX ORDER — SODIUM CHLORIDE 0.9 % (FLUSH) 0.9 %
10 SYRINGE (ML) INJECTION AS NEEDED
Status: DISCONTINUED | OUTPATIENT
Start: 2024-06-12 | End: 2024-06-14 | Stop reason: HOSPADM

## 2024-06-13 ENCOUNTER — APPOINTMENT (OUTPATIENT)
Dept: CT IMAGING | Facility: HOSPITAL | Age: 59
DRG: 189 | End: 2024-06-13
Payer: COMMERCIAL

## 2024-06-13 PROBLEM — J96.20 ACUTE-ON-CHRONIC RESPIRATORY FAILURE: Status: ACTIVE | Noted: 2024-06-13

## 2024-06-13 PROBLEM — R41.82 AMS (ALTERED MENTAL STATUS): Status: ACTIVE | Noted: 2024-06-13

## 2024-06-13 LAB
ALBUMIN SERPL-MCNC: 3.8 G/DL (ref 3.5–5.2)
ALBUMIN/GLOB SERPL: 1.5 G/DL
ALP SERPL-CCNC: 60 U/L (ref 39–117)
ALT SERPL W P-5'-P-CCNC: 13 U/L (ref 1–33)
ANION GAP SERPL CALCULATED.3IONS-SCNC: 9.9 MMOL/L (ref 5–15)
AST SERPL-CCNC: 17 U/L (ref 1–32)
ATMOSPHERIC PRESS: 744.2 MMHG
BASE EXCESS BLDV CALC-SCNC: 3.9 MMOL/L (ref -2–2)
BILIRUB SERPL-MCNC: 0.5 MG/DL (ref 0–1.2)
BUN SERPL-MCNC: 11 MG/DL (ref 6–20)
BUN/CREAT SERPL: 20.8 (ref 7–25)
C3 SERPL-MCNC: 174 MG/DL (ref 82–167)
C4 SERPL-MCNC: 38 MG/DL (ref 14–44)
CALCIUM SPEC-SCNC: 9.1 MG/DL (ref 8.6–10.5)
CHLORIDE SERPL-SCNC: 98 MMOL/L (ref 98–107)
CO2 SERPL-SCNC: 30.1 MMOL/L (ref 22–29)
CREAT SERPL-MCNC: 0.53 MG/DL (ref 0.57–1)
CRP SERPL-MCNC: 2.28 MG/DL (ref 0–0.5)
EGFRCR SERPLBLD CKD-EPI 2021: 107.4 ML/MIN/1.73
ERYTHROCYTE [SEDIMENTATION RATE] IN BLOOD: 16 MM/HR (ref 0–30)
FLUAV SUBTYP SPEC NAA+PROBE: NOT DETECTED
FLUBV RNA ISLT QL NAA+PROBE: NOT DETECTED
GAS FLOW AIRWAY: 3 LPM
GLOBULIN UR ELPH-MCNC: 2.6 GM/DL
GLUCOSE BLDC GLUCOMTR-MCNC: 113 MG/DL (ref 70–99)
GLUCOSE BLDC GLUCOMTR-MCNC: 115 MG/DL (ref 70–99)
GLUCOSE BLDC GLUCOMTR-MCNC: 137 MG/DL (ref 70–99)
GLUCOSE BLDC GLUCOMTR-MCNC: 165 MG/DL (ref 70–99)
GLUCOSE SERPL-MCNC: 119 MG/DL (ref 65–99)
HCO3 BLDV-SCNC: 27.1 MMOL/L (ref 22–26)
HGB BLDA-MCNC: 13.7 G/DL (ref 12–18)
HOLD SPECIMEN: NORMAL
INHALED O2 CONCENTRATION: 32 %
MAGNESIUM SERPL-MCNC: 1.6 MG/DL (ref 1.6–2.6)
MODALITY: ABNORMAL
NT-PROBNP SERPL-MCNC: 191.1 PG/ML (ref 0–900)
PCO2 BLDV: 35.6 MM HG (ref 41–51)
PH BLDV: 7.49 PH UNITS (ref 7.31–7.41)
PO2 BLDV: 45.6 MM HG (ref 35–42)
POTASSIUM SERPL-SCNC: 3.8 MMOL/L (ref 3.5–5.2)
PROCALCITONIN SERPL-MCNC: 0.07 NG/ML (ref 0–0.25)
PROT SERPL-MCNC: 6.4 G/DL (ref 6–8.5)
QT INTERVAL: 384 MS
QTC INTERVAL: 483 MS
RSV RNA NPH QL NAA+NON-PROBE: NOT DETECTED
SAO2 % BLDCOV: 85.1 % (ref 45–75)
SARS-COV-2 RNA RESP QL NAA+PROBE: NOT DETECTED
SODIUM SERPL-SCNC: 138 MMOL/L (ref 136–145)
TROPONIN T SERPL HS-MCNC: 19 NG/L
TSH SERPL DL<=0.05 MIU/L-ACNC: 1.19 UIU/ML (ref 0.27–4.2)
WHOLE BLOOD HOLD SPECIMEN: NORMAL

## 2024-06-13 PROCEDURE — 86140 C-REACTIVE PROTEIN: CPT | Performed by: INTERNAL MEDICINE

## 2024-06-13 PROCEDURE — 96374 THER/PROPH/DIAG INJ IV PUSH: CPT

## 2024-06-13 PROCEDURE — 71250 CT THORAX DX C-: CPT

## 2024-06-13 PROCEDURE — 25010000002 ONDANSETRON PER 1 MG: Performed by: PHYSICIAN ASSISTANT

## 2024-06-13 PROCEDURE — 94640 AIRWAY INHALATION TREATMENT: CPT

## 2024-06-13 PROCEDURE — 94799 UNLISTED PULMONARY SVC/PX: CPT

## 2024-06-13 PROCEDURE — 86160 COMPLEMENT ANTIGEN: CPT | Performed by: INTERNAL MEDICINE

## 2024-06-13 PROCEDURE — 51701 INSERT BLADDER CATHETER: CPT

## 2024-06-13 PROCEDURE — 96361 HYDRATE IV INFUSION ADD-ON: CPT

## 2024-06-13 PROCEDURE — 70450 CT HEAD/BRAIN W/O DYE: CPT

## 2024-06-13 PROCEDURE — 99223 1ST HOSP IP/OBS HIGH 75: CPT | Performed by: FAMILY MEDICINE

## 2024-06-13 PROCEDURE — 25010000002 ENOXAPARIN PER 10 MG: Performed by: FAMILY MEDICINE

## 2024-06-13 PROCEDURE — 25810000003 LACTATED RINGERS PER 1000 ML: Performed by: INTERNAL MEDICINE

## 2024-06-13 PROCEDURE — 25810000003 LACTATED RINGERS PER 1000 ML: Performed by: PHYSICIAN ASSISTANT

## 2024-06-13 PROCEDURE — 51798 US URINE CAPACITY MEASURE: CPT

## 2024-06-13 PROCEDURE — 63710000001 PREDNISONE PER 5 MG: Performed by: INTERNAL MEDICINE

## 2024-06-13 PROCEDURE — 85652 RBC SED RATE AUTOMATED: CPT | Performed by: INTERNAL MEDICINE

## 2024-06-13 PROCEDURE — 25810000003 LACTATED RINGERS SOLUTION: Performed by: PHYSICIAN ASSISTANT

## 2024-06-13 PROCEDURE — 25010000002 NALOXONE HCL 2 MG/2ML SOLUTION PREFILLED SYRINGE: Performed by: EMERGENCY MEDICINE

## 2024-06-13 PROCEDURE — 96375 TX/PRO/DX INJ NEW DRUG ADDON: CPT

## 2024-06-13 PROCEDURE — 25010000002 AZITHROMYCIN PER 500 MG: Performed by: FAMILY MEDICINE

## 2024-06-13 PROCEDURE — 96372 THER/PROPH/DIAG INJ SC/IM: CPT

## 2024-06-13 PROCEDURE — 87040 BLOOD CULTURE FOR BACTERIA: CPT | Performed by: EMERGENCY MEDICINE

## 2024-06-13 PROCEDURE — 82948 REAGENT STRIP/BLOOD GLUCOSE: CPT | Performed by: FAMILY MEDICINE

## 2024-06-13 PROCEDURE — 25810000003 SODIUM CHLORIDE 0.9 % SOLUTION: Performed by: FAMILY MEDICINE

## 2024-06-13 PROCEDURE — 84145 PROCALCITONIN (PCT): CPT | Performed by: INTERNAL MEDICINE

## 2024-06-13 PROCEDURE — 25810000003 LACTATED RINGERS PER 1000 ML: Performed by: FAMILY MEDICINE

## 2024-06-13 PROCEDURE — 36415 COLL VENOUS BLD VENIPUNCTURE: CPT | Performed by: EMERGENCY MEDICINE

## 2024-06-13 PROCEDURE — 84443 ASSAY THYROID STIM HORMONE: CPT | Performed by: INTERNAL MEDICINE

## 2024-06-13 PROCEDURE — 92610 EVALUATE SWALLOWING FUNCTION: CPT

## 2024-06-13 PROCEDURE — 82948 REAGENT STRIP/BLOOD GLUCOSE: CPT

## 2024-06-13 RX ORDER — SODIUM CHLORIDE 0.9 % (FLUSH) 0.9 %
10 SYRINGE (ML) INJECTION EVERY 12 HOURS SCHEDULED
Status: DISCONTINUED | OUTPATIENT
Start: 2024-06-13 | End: 2024-06-14 | Stop reason: HOSPADM

## 2024-06-13 RX ORDER — PREDNISONE 10 MG/1
10 TABLET ORAL DAILY
Status: DISCONTINUED | OUTPATIENT
Start: 2024-06-13 | End: 2024-06-14 | Stop reason: HOSPADM

## 2024-06-13 RX ORDER — SODIUM CHLORIDE 9 MG/ML
40 INJECTION, SOLUTION INTRAVENOUS AS NEEDED
Status: DISCONTINUED | OUTPATIENT
Start: 2024-06-13 | End: 2024-06-14 | Stop reason: HOSPADM

## 2024-06-13 RX ORDER — IBUPROFEN 600 MG/1
1 TABLET ORAL
Status: DISCONTINUED | OUTPATIENT
Start: 2024-06-13 | End: 2024-06-14 | Stop reason: HOSPADM

## 2024-06-13 RX ORDER — IPRATROPIUM BROMIDE AND ALBUTEROL SULFATE 2.5; .5 MG/3ML; MG/3ML
3 SOLUTION RESPIRATORY (INHALATION)
Status: DISCONTINUED | OUTPATIENT
Start: 2024-06-13 | End: 2024-06-13

## 2024-06-13 RX ORDER — MEGESTROL ACETATE 40 MG/1
20 TABLET ORAL DAILY
Status: DISCONTINUED | OUTPATIENT
Start: 2024-06-14 | End: 2024-06-14 | Stop reason: HOSPADM

## 2024-06-13 RX ORDER — NICOTINE POLACRILEX 4 MG
15 LOZENGE BUCCAL
Status: DISCONTINUED | OUTPATIENT
Start: 2024-06-13 | End: 2024-06-14 | Stop reason: HOSPADM

## 2024-06-13 RX ORDER — AMOXICILLIN 250 MG
2 CAPSULE ORAL 2 TIMES DAILY PRN
Status: DISCONTINUED | OUTPATIENT
Start: 2024-06-13 | End: 2024-06-14 | Stop reason: HOSPADM

## 2024-06-13 RX ORDER — SODIUM CHLORIDE, SODIUM LACTATE, POTASSIUM CHLORIDE, CALCIUM CHLORIDE 600; 310; 30; 20 MG/100ML; MG/100ML; MG/100ML; MG/100ML
125 INJECTION, SOLUTION INTRAVENOUS CONTINUOUS
Status: ACTIVE | OUTPATIENT
Start: 2024-06-13 | End: 2024-06-14

## 2024-06-13 RX ORDER — MINERAL OIL/HYDROPHIL PETROLAT
1 OINTMENT (GRAM) TOPICAL
Status: DISCONTINUED | OUTPATIENT
Start: 2024-06-13 | End: 2024-06-14 | Stop reason: HOSPADM

## 2024-06-13 RX ORDER — ACETAMINOPHEN 325 MG/1
325 TABLET ORAL EVERY 6 HOURS PRN
Status: DISCONTINUED | OUTPATIENT
Start: 2024-06-13 | End: 2024-06-14 | Stop reason: HOSPADM

## 2024-06-13 RX ORDER — IPRATROPIUM BROMIDE AND ALBUTEROL SULFATE 2.5; .5 MG/3ML; MG/3ML
3 SOLUTION RESPIRATORY (INHALATION) EVERY 6 HOURS PRN
Status: DISCONTINUED | OUTPATIENT
Start: 2024-06-13 | End: 2024-06-14 | Stop reason: HOSPADM

## 2024-06-13 RX ORDER — POLYETHYLENE GLYCOL 3350 17 G/17G
17 POWDER, FOR SOLUTION ORAL DAILY PRN
Status: DISCONTINUED | OUTPATIENT
Start: 2024-06-13 | End: 2024-06-14 | Stop reason: HOSPADM

## 2024-06-13 RX ORDER — GUAIFENESIN 600 MG/1
600 TABLET, EXTENDED RELEASE ORAL EVERY 12 HOURS SCHEDULED
Status: DISCONTINUED | OUTPATIENT
Start: 2024-06-13 | End: 2024-06-13

## 2024-06-13 RX ORDER — MIDODRINE HYDROCHLORIDE 10 MG/1
10 TABLET ORAL
Status: DISCONTINUED | OUTPATIENT
Start: 2024-06-13 | End: 2024-06-13

## 2024-06-13 RX ORDER — ENOXAPARIN SODIUM 100 MG/ML
40 INJECTION SUBCUTANEOUS DAILY
Status: DISCONTINUED | OUTPATIENT
Start: 2024-06-13 | End: 2024-06-14 | Stop reason: HOSPADM

## 2024-06-13 RX ORDER — CLOPIDOGREL BISULFATE 75 MG/1
75 TABLET ORAL DAILY
Status: DISCONTINUED | OUTPATIENT
Start: 2024-06-13 | End: 2024-06-14 | Stop reason: HOSPADM

## 2024-06-13 RX ORDER — AZITHROMYCIN 250 MG/1
250 TABLET, FILM COATED ORAL
Status: DISCONTINUED | OUTPATIENT
Start: 2024-06-14 | End: 2024-06-14 | Stop reason: HOSPADM

## 2024-06-13 RX ORDER — ARFORMOTEROL TARTRATE 15 UG/2ML
15 SOLUTION RESPIRATORY (INHALATION)
Status: DISCONTINUED | OUTPATIENT
Start: 2024-06-13 | End: 2024-06-14 | Stop reason: HOSPADM

## 2024-06-13 RX ORDER — SODIUM CHLORIDE 0.9 % (FLUSH) 0.9 %
10 SYRINGE (ML) INJECTION AS NEEDED
Status: DISCONTINUED | OUTPATIENT
Start: 2024-06-13 | End: 2024-06-14 | Stop reason: HOSPADM

## 2024-06-13 RX ORDER — NALOXONE HYDROCHLORIDE 1 MG/ML
2 INJECTION INTRAMUSCULAR; INTRAVENOUS; SUBCUTANEOUS ONCE
Status: COMPLETED | OUTPATIENT
Start: 2024-06-13 | End: 2024-06-13

## 2024-06-13 RX ORDER — BISACODYL 10 MG
10 SUPPOSITORY, RECTAL RECTAL DAILY PRN
Status: DISCONTINUED | OUTPATIENT
Start: 2024-06-13 | End: 2024-06-14 | Stop reason: HOSPADM

## 2024-06-13 RX ORDER — SODIUM CHLORIDE, SODIUM LACTATE, POTASSIUM CHLORIDE, CALCIUM CHLORIDE 600; 310; 30; 20 MG/100ML; MG/100ML; MG/100ML; MG/100ML
75 INJECTION, SOLUTION INTRAVENOUS CONTINUOUS
Status: ACTIVE | OUTPATIENT
Start: 2024-06-13 | End: 2024-06-13

## 2024-06-13 RX ORDER — ONDANSETRON 2 MG/ML
4 INJECTION INTRAMUSCULAR; INTRAVENOUS EVERY 4 HOURS PRN
Status: DISCONTINUED | OUTPATIENT
Start: 2024-06-13 | End: 2024-06-14 | Stop reason: HOSPADM

## 2024-06-13 RX ORDER — MEGESTROL ACETATE 40 MG/ML
200 SUSPENSION ORAL DAILY
Status: DISCONTINUED | OUTPATIENT
Start: 2024-06-14 | End: 2024-06-13

## 2024-06-13 RX ORDER — IPRATROPIUM BROMIDE AND ALBUTEROL SULFATE 2.5; .5 MG/3ML; MG/3ML
3 SOLUTION RESPIRATORY (INHALATION) EVERY 4 HOURS PRN
Status: DISCONTINUED | OUTPATIENT
Start: 2024-06-13 | End: 2024-06-13

## 2024-06-13 RX ORDER — BUDESONIDE 0.5 MG/2ML
0.5 INHALANT ORAL
Status: DISCONTINUED | OUTPATIENT
Start: 2024-06-13 | End: 2024-06-14 | Stop reason: HOSPADM

## 2024-06-13 RX ORDER — INSULIN LISPRO 100 [IU]/ML
2-9 INJECTION, SOLUTION INTRAVENOUS; SUBCUTANEOUS
Status: DISCONTINUED | OUTPATIENT
Start: 2024-06-13 | End: 2024-06-14 | Stop reason: HOSPADM

## 2024-06-13 RX ORDER — NICOTINE 21 MG/24HR
1 PATCH, TRANSDERMAL 24 HOURS TRANSDERMAL
Status: DISCONTINUED | OUTPATIENT
Start: 2024-06-13 | End: 2024-06-14 | Stop reason: HOSPADM

## 2024-06-13 RX ORDER — GUAIFENESIN/DEXTROMETHORPHAN 100-10MG/5
10 SYRUP ORAL EVERY 6 HOURS PRN
Status: DISCONTINUED | OUTPATIENT
Start: 2024-06-13 | End: 2024-06-14 | Stop reason: HOSPADM

## 2024-06-13 RX ORDER — GABAPENTIN 300 MG/1
300 CAPSULE ORAL EVERY 12 HOURS PRN
Status: DISCONTINUED | OUTPATIENT
Start: 2024-06-13 | End: 2024-06-14 | Stop reason: HOSPADM

## 2024-06-13 RX ORDER — DEXTROSE MONOHYDRATE 25 G/50ML
25 INJECTION, SOLUTION INTRAVENOUS
Status: DISCONTINUED | OUTPATIENT
Start: 2024-06-13 | End: 2024-06-14 | Stop reason: HOSPADM

## 2024-06-13 RX ORDER — BISACODYL 5 MG/1
5 TABLET, DELAYED RELEASE ORAL DAILY PRN
Status: DISCONTINUED | OUTPATIENT
Start: 2024-06-13 | End: 2024-06-14 | Stop reason: HOSPADM

## 2024-06-13 RX ORDER — GABAPENTIN 300 MG/1
300 CAPSULE ORAL EVERY 8 HOURS SCHEDULED
Status: DISCONTINUED | OUTPATIENT
Start: 2024-06-13 | End: 2024-06-13

## 2024-06-13 RX ADMIN — SODIUM CHLORIDE, POTASSIUM CHLORIDE, SODIUM LACTATE AND CALCIUM CHLORIDE 500 ML: 600; 310; 30; 20 INJECTION, SOLUTION INTRAVENOUS at 23:57

## 2024-06-13 RX ADMIN — IPRATROPIUM BROMIDE AND ALBUTEROL SULFATE 3 ML: .5; 3 SOLUTION RESPIRATORY (INHALATION) at 06:58

## 2024-06-13 RX ADMIN — SODIUM CHLORIDE, POTASSIUM CHLORIDE, SODIUM LACTATE AND CALCIUM CHLORIDE 75 ML/HR: 600; 310; 30; 20 INJECTION, SOLUTION INTRAVENOUS at 17:57

## 2024-06-13 RX ADMIN — NICOTINE 1 PATCH: 21 PATCH, EXTENDED RELEASE TRANSDERMAL at 19:50

## 2024-06-13 RX ADMIN — ACETAMINOPHEN 325 MG: 325 TABLET ORAL at 14:21

## 2024-06-13 RX ADMIN — ONDANSETRON 4 MG: 2 INJECTION INTRAMUSCULAR; INTRAVENOUS at 20:08

## 2024-06-13 RX ADMIN — SODIUM CHLORIDE, POTASSIUM CHLORIDE, SODIUM LACTATE AND CALCIUM CHLORIDE 75 ML/HR: 600; 310; 30; 20 INJECTION, SOLUTION INTRAVENOUS at 04:34

## 2024-06-13 RX ADMIN — ARFORMOTEROL TARTRATE 15 MCG: 15 SOLUTION RESPIRATORY (INHALATION) at 19:34

## 2024-06-13 RX ADMIN — SODIUM CHLORIDE, POTASSIUM CHLORIDE, SODIUM LACTATE AND CALCIUM CHLORIDE 1000 ML: 600; 310; 30; 20 INJECTION, SOLUTION INTRAVENOUS at 20:37

## 2024-06-13 RX ADMIN — CLOPIDOGREL BISULFATE 75 MG: 75 TABLET ORAL at 14:22

## 2024-06-13 RX ADMIN — Medication 10 ML: at 20:09

## 2024-06-13 RX ADMIN — WHITE PETROLATUM 1 APPLICATION: 1.75 OINTMENT TOPICAL at 21:08

## 2024-06-13 RX ADMIN — NALOXONE HYDROCHLORIDE 2 MG: 1 INJECTION PARENTERAL at 00:50

## 2024-06-13 RX ADMIN — ENOXAPARIN SODIUM 40 MG: 100 INJECTION SUBCUTANEOUS at 09:58

## 2024-06-13 RX ADMIN — GABAPENTIN 300 MG: 300 CAPSULE ORAL at 14:22

## 2024-06-13 RX ADMIN — GABAPENTIN 300 MG: 300 CAPSULE ORAL at 23:57

## 2024-06-13 RX ADMIN — BUDESONIDE 0.5 MG: 0.5 SUSPENSION RESPIRATORY (INHALATION) at 19:34

## 2024-06-13 RX ADMIN — PREDNISONE 10 MG: 10 TABLET ORAL at 14:22

## 2024-06-13 NOTE — CASE MANAGEMENT/SOCIAL WORK
Ms Llanes was set up with home Astral NIV in February 2024.  Patient has not been adherent to therapy, having only used the device 2 days (less than 4 hours each day) out of the last 60 days.     Patient is at risk of device  if adherence is not quickly established.

## 2024-06-13 NOTE — PLAN OF CARE
Goal Outcome Evaluation:  Plan of Care Reviewed With: patient      ASSESSMENT/ PLAN OF CARE:  Pt presents with limitations, noted below, that impede her ability to swallow safely and maintain nutrition. The skills of a therapist will be required to safely and effectively implement the following treatment plan to restore maximal level of function.    PROBLEMS:  1.   Swallow delay, decreased laryngeal elevation, risk of aspiration                         TREATMENT: Dysphagia therapy to address swallow function through exercises and education of strategies.     FREQUENCY/DURATION: Twice daily 5 times per week    REHAB POTENTIAL:  Pt has fair to good rehab potential.  The following limitations may influence improvement/ length of tx: Medical status.    RECOMMENDATIONS:   1.   DIET: Mechanical ground solids, honey thickened liquid.    2.  POSITION: Positioning fully upright for all p.o. intake and 30 minutes following.    3.  COMPENSATORY STRATEGIES: Alternate small bites and small sips of solids and liquids at a slow rate.  Medications whole in applesauce.            Anticipated Discharge Disposition (SLP): home with assist

## 2024-06-13 NOTE — PROGRESS NOTES
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  Initial BP soft with heart rate in the 100s and SpO2 81% on room air.  pH 7.49, pCO2 35, pO2 45, SpO2 85% on 2 L.  Chest x-ray clear.  White count normal.  CT head no gross abnormality.  CT chest without contrast with findings of emphysema, small tree-in-bud opacities right middle lobe, infectious or inflammatory.  Due to IV fluids, empiric antibiotics, sedating medications held    Interval Followup:   Remains lethargic but oriented x 3, no fevers.  Intermittently tachycardic.  Blood pressure soft.  On 2 L of oxygen  Just feels unwell, weak, worn out  No fever or chills.  No productive cough.  No chest pain   complaining of pain all over  Cleared for modified diet by speech    Objective   Objective     Vitals:   Temp:  [99 °F (37.2 °C)-99.3 °F (37.4 °C)] 99 °F (37.2 °C)  Heart Rate:  [] 105  Resp:  [16-26] 20  BP: ()/(59-84) 93/60  Flow (L/min):  [2-3] 2  Physical Exam    Constitutional: WN female, resting in bed, conversant, looks uncomfortable   Respiratory: Diminished, no significant wheezing, nonlabored respirations    Cardiovascular:  RRR, no edema   Gastrointestinal: soft, nontender, nondistended   Neurologic: Lethargic but oriented x 3,  CN grossly intact, speech soft   Skin: Extremities warm, no rashes    Result Review    Result Review:  I have personally reviewed the following over the last 24 hours (07:00 to 07:00) and agree with the following findings  [x]  Laboratory  [x]  Microbiology  [x]  Radiology   [x]  EKG/Telemetry   []  Cardiology/Vascular   []  Pathology  []  Old records  []  Other:    Assessment & Plan   Assessment / Plan     Assessment/Plan:  Acute on chronic hypoxic respiratory failure  COPD not in exacerbation  Chronic pain on Dilaudid pump  History of lupus on chronic prednisone  Squamous cell carcinoma of the supraglottic larynx status postradiation   Tobacco abuse  CAD  Type 2 diabetes mellitus  Hypertension  History of aortic stenosis status post TAVR           Initially thought presentation was from sedating medications in setting of noncompliance with home NIV machine but pCO2 was not elevated  White count is normal, Pro-Ge low CT of the chest does not overly convincing for pneumonia.  Will keep on Azithromycin   proBNP normal and does not look volume overload  Continue LR 75 cc/h  Back down to 2 L nasal cannula, need to maintain SpO2 above 90%  Will order BiPAP 15 over 5 at night and as needed  remained lethargic throughout the day.  Will hold Xanax and not give any as further pain meds aside from continuing Dilaudid pump which pharmacy has confirmed and ordered  Add Brovana and Pulmicort twice daily.  I do not see any maintenance inhalers on medication list  Check inflammatory markers, AM cortisol, TSH, complement levels, double-stranded DNA  Resume prednisone 10 mg daily. Unclear if taking Hydroxy chloroquine  Resume gabapentin but adjust to 300 mg every 12 hours as needed  Modified diet per SLP recs. Aspiration precuations  VTE ppx: Lovenox 40 mg daily        Discussed plan with RN.    VTE Prophylaxis:  Pharmacologic VTE prophylaxis orders are present.        CODE STATUS:   Level Of Support Discussed With: Health Care  Surrogate  Code Status (Patient has no pulse and is not breathing): CPR (Attempt to Resuscitate)  Medical Interventions (Patient has pulse or is breathing): Full Support      Electronically signed by Geraldo Gould DO, 06/3/24, 6:50 PM EDT.

## 2024-06-13 NOTE — THERAPY EVALUATION
Acute Care - Speech Language Pathology   Swallow Initial Evaluation JEMMA Go     Patient Name: Isha Llanes  : 1965  MRN: [id number]  Today's Date: 2024               Admit Date: 2024    Visit Dx:     ICD-10-CM ICD-9-CM   1. Lethargy  R53.83 780.79   2. Oropharyngeal dysphagia  R13.12 787.22     Patient Active Problem List   Diagnosis    Septic shock    Acute MI    Cancer associated pain    Presence of intrathecal pump    Chronic low back pain with bilateral sciatica    Sacroiliac inflammation    Chronic pain syndrome    Pelvic pain    Aortic stenosis, severe    Dyspnea on effort    Other pulmonary embolism without acute cor pulmonale    Nonrheumatic aortic valve stenosis    Hip pain    Anxiety disorder    Allergic rhinitis due to allergen    Arthritis    Asthma    Kidney disease    CHF (congestive heart failure)    Chronic obstructive pulmonary disease    Diabetes mellitus, type 2    Depression    GERD (gastroesophageal reflux disease)    S/P TAVR (transcatheter aortic valve replacement)    Limited mobility    Venous hypertension of both lower extremities    Nicotine dependence    Mitral valve prolapse    Lupus (systemic lupus erythematosus)    Long term current use of anticoagulant therapy    History of Hodgkin's lymphoma    Hepatic steatosis    Heart murmur    CAD (coronary artery disease)    Non-healing wound of lower extremity, subsequent encounter    MSSA (methicillin susceptible Staphylococcus aureus) infection    Sepsis    Change in bowel habits    Nausea    Venous stasis ulcer of right calf    Venous stasis ulcer of left calf    Noncompliance    Peripheral vascular disease    Obesity with body mass index 30 or greater    Neurologic disorder    Neck pain    Limb pain    Hiatal hernia    Bladder disorder    Frailty    Cellulitis of right foot    Bowel disease    Atrophy of skin    Breast lump    Encounter for care related to vascular access port    Primary osteoarthritis of right hip     Tobacco abuse    Laryngeal cancer    Throat pain    Voice hoarseness    Hodgkin lymphoma    Malignant neoplasm of supraglottis    Dysphagia    Nausea vomiting and diarrhea    Weight loss, abnormal    Epigastric pain    Nausea and vomiting    History of laryngeal cancer    Anorexia    AMS (altered mental status)    Acute-on-chronic respiratory failure     Past Medical History:   Diagnosis Date    Anesthesia     REPORTS HAS GOT REAL EMOTIONAL AND HAS BECOME ANGRY BEFORE    Anxiety     Aortic stenosis, severe     HAS BIO VALVE REPLACED    Arthritis     Asthma     Back pain     Blood clotting disorder     Cancer     Cancer associated pain     HODGKINS LYMPHOMA    CHF (congestive heart failure)     Chronic low back pain with sciatica     Chronic pain disorder     COPD (chronic obstructive pulmonary disease)     Coronary artery disease     Diabetes mellitus     TYPE 2    Diabetes mellitus     Dyspnea on effort     GERD (gastroesophageal reflux disease)     H/O lumpectomy     H/O: hysterectomy     Hiatal hernia     History of degenerative disc disease     History of kidney stones     History of pulmonary embolus (PE)     History of transfusion     AS A CHILD NO TRANSFUSION REACTION    History of transfusion     Hodgkin's lymphoma     5/19/23  5 YEARS SINCE LAST CHEMO TX    Hypertension     Immobility     Liver disease     DENIES ANY CURRENT ISSUES    Lupus     Mitral valve prolapse     Nausea vomiting and diarrhea 3/7/2024    Panic disorder     Pelvic pain     Peripheral neuropathy     Personal history of DVT (deep vein thrombosis)     Presence of intrathecal pump     PTSD (post-traumatic stress disorder)     Sacroiliac joint disease     SI (sacroiliac) joint inflammation     Sleep apnea     Venous insufficiency      Past Surgical History:   Procedure Laterality Date    ABDOMINAL SURGERY      BACK SURGERY      SPINAL FUSION     BACK SURGERY      BLADDER REPAIR      CARDIAC CATHETERIZATION N/A 03/06/2019    Procedure:  Valvuloplasty;  Surgeon: Wayne Johnson MD;  Location: [facility] CATH INVASIVE LOCATION;  Service: Cardiology    CARDIAC CATHETERIZATION      CARDIAC CATHETERIZATION Left 5/31/2023    Procedure: Cardiac Catheterization/Vascular Study;  Surgeon: Poncho Reid MD;  Location: [facility] CATH INVASIVE LOCATION;  Service: Cardiovascular;  Laterality: Left;    CARDIAC SURGERY      CARDIAC VALVE REPLACEMENT  2021    CHOLECYSTECTOMY      COLONOSCOPY N/A 12/29/2021    Procedure: COLONOSCOPY;  Surgeon: Karine Orlando MD;  Location: [facility] ENDOSCOPY;  Service: Gastroenterology;  Laterality: N/A;  POOR PREP    COLONOSCOPY N/A 04/13/2022    Procedure: COLONOSCOPY WITH POLYPECTOMY;  Surgeon: Karine Orlando MD;  Location: [facility] ENDOSCOPY;  Service: Gastroenterology;  Laterality: N/A;  COLON POLYP, HEMORRHOIDS, POOR PREP    COLONOSCOPY      DIRECT LARYNGOSCOPY, ESOPHAGOSCOPY, BRONCHOSCOPY N/A 5/22/2023    Procedure: DIRECT LARYNGOSCOPY WITH BIOPSIES , ESOPHAGOSCOPY, BRONCHOSCOPY;  Surgeon: Ronnie Mcgarry MD;  Location: [facility] OR [facility];  Service: ENT;  Laterality: N/A;    ENDOSCOPY N/A 12/29/2021    Procedure: ESOPHAGOGASTRODUODENOSCOPY;  Surgeon: Karine Orlando MD;  Location: [facility] ENDOSCOPY;  Service: Gastroenterology;  Laterality: N/A;  ESOPHAGITIS, GASTRITIS, HIATAL HERNIA    ENDOSCOPY      ENDOSCOPY N/A 4/16/2024    Procedure: ESOPHAGOGASTRODUODENOSCOPY WITH BIOPSIES;  Surgeon: Karine Orlando MD;  Location: [facility] ENDOSCOPY;  Service: Gastroenterology;  Laterality: N/A;  ESOPHAGITIS, HIATAL HERNIA    HYSTERECTOMY      LYMPHADENECTOMY      PAIN PUMP INSERTION/REVISION N/A 10/18/2019    Procedure: PAIN PUMP INSERTION [facility] 10-18-19 RIOS;  Surgeon: Horace Rios MD;  Location: [facility] MAIN OR;  Service: Pain Management    PAIN PUMP INSERTION/REVISION N/A 11/23/2020    Procedure: pain pump removal;  Surgeon: Horace Rios MD;  Location: [facility] MAIN OR;  Service: Pain  Management;  Laterality: N/A;    PEG TUBE INSERTION N/A 7/26/2023    Procedure: PERCUTANEOUS ENDOSCOPIC GASTROSTOMY TUBE INSERTION;  Surgeon: Kody Mercer MD;  Location: [facility] ENDOSCOPY;  Service: General;  Laterality: N/A;  SUCCESSFUL PEG TUBE PLACE PLACEMENT    PORTACATH PLACEMENT      IN LEFT CHEST REPORTS THAT IT IS NOT FUNCTIONING    SKIN BIOPSY      TONSILLECTOMY      TUBAL ABDOMINAL LIGATION      TUMOR REMOVAL               Inpatient Speech Pathology Dysphagia Evaluation        PAIN SCALE: Complaining of back pain, did not rate, nursing aware.    PRECAUTIONS/CONTRAINDICATIONS: Standard    SUSPECTED ABUSE/NEGLECT/EXPLOITATION: None indicated.    SOCIAL/PSYCHOLOGICAL NEEDS/BARRIERS: None indicated.    PAST SOCIAL HISTORY: 58-year-old female lives at home with family    PRIOR FUNCTION: Independent, patient reports regular diet with thin liquids at home    PATIENT GOALS/EXPECTATIONS: Continue eating orally, patient states not wanting core track    HISTORY: 58-year-old female the above diagnosis referred for speech therapy evaluation to assess for swallowing.  Patient has been seen by speech pathology services in the past with modified barium swallow study completed 2/6/2024.  Recommended for nectar thickened liquids with deep laryngeal penetration of thin.    CURRENT DIET LEVEL:NPO    OBJECTIVE:    TEST ADMINISTERED: Clinical dysphagia evaluation    COGNITION/SAFETY AWARENESS: Appears impaired.  Patient did follow-up directions and respond to questions.    BEHAVIORAL OBSERVATIONS: Patient often bowing head down and closing eyes and requiring cueing to respond.    ORAL MOTOR EXAM: Lingual surfaces appear coated.  Structures and functions appeared within limits    VOICE QUALITY: Very gruff/hoarse    REFLEX EXAM: Deferred    POSTURE: Sitting upright in bed, patient often letting head drop forward.    FEEDING/SWALLOWING FUNCTION: Assessed with nectar liquid, thin liquids, puréed solids, crunchy  solid.    CLINICAL OBSERVATIONS: Nectar liquid by cup with delay, minimal vocal wetness noted to cervical auscultation.  Thin liquid by cup with decreased laryngeal elevation, wet vocal quality noted.  Patient cued to clear throat.  Purée solid with swallow completed with decreased laryngeal elevation noted to palpation.  Crunchy solid with adequate chewing followed by swallow completed clearing the oral cavity.    DYSPHAGIA CRITERIA: Decreased laryngeal elevation, swallow delay, signs of likely aspiration.    FUNCTIONAL ASSESSMENT INSTRUMENT: Patient currently scored a level 4 of 7 on Functional Communication Measures for swallowing indicating a 40-59% limitation in function.    ASSESSMENT/ PLAN OF CARE:  Pt presents with limitations, noted below, that impede her ability to swallow safely and maintain nutrition. The skills of a therapist will be required to safely and effectively implement the following treatment plan to restore maximal level of function.    PROBLEMS:  1.   Swallow delay, decreased laryngeal elevation, risk of aspiration                       LTG 1: 30 days: Patient will tolerate least restrictive diet utilizing appropriate positioning and strategies with minimal assistance.                       STG 1a: 14 days: Patient will tolerate diet of mechanical ground solids and honey thickened liquids with minimal assistance for strategies.                       STG 1b: 14 days: Patient will tolerate 8 of 10 trials of nectar thickened liquids with min to no signs or symptoms of aspiration.                       STG 1c: 14 days: Patient/family education.                       TREATMENT: Dysphagia therapy to address swallow function through exercises and education of strategies.     FREQUENCY/DURATION: Twice daily 5 times per week    REHAB POTENTIAL:  Pt has fair to good rehab potential.  The following limitations may influence improvement/ length of tx: Medical status.    RECOMMENDATIONS:   1.   DIET:  Mechanical ground solids, honey thickened liquid.    2.  POSITION: Positioning fully upright for all p.o. intake and 30 minutes following.    3.  COMPENSATORY STRATEGIES: Alternate small bites and small sips of solids and liquids at a slow rate.  Medications whole in applesauce.    Pt/responsible party agrees with plan of care and has been informed of all alternatives, risks and benefits.                            Anticipated Discharge Disposition (SLP): home with assist (06/13/24 1346)                                                               EDUCATION  The patient has been educated in the following areas:   Modified Diet Instruction.                Time Calculation:    Time Calculation- SLP       Row Name 06/13/24 1346             Time Calculation- SLP    SLP Start Time 1030  -TB      SLP Stop Time 1130  -TB      SLP Time Calculation (min) 60 min  -TB      SLP Received On 06/13/24  -TB         Untimed Charges    SLP Eval/Re-eval  ST Eval Oral Pharyng Swallow - 52826  -TB      75139-HQ Eval Oral Pharyng Swallow Minutes 60  -TB         Total Minutes    Untimed Charges Total Minutes 60  -TB       Total Minutes 60  -TB                User Key  (r) = Recorded By, (t) = Taken By, (c) = Cosigned By      Initials Name Provider Type    TB Alison Boogie SLP Speech and Language Pathologist                    Therapy Charges for Today       Code Description Service Date Service Provider Modifiers Qty    [id number] HC ST EVAL ORAL PHARYNG SWALLOW 4 6/13/2024 Alison Boogie SLP GN 1                 SAM Wagoner  6/13/2024

## 2024-06-13 NOTE — PAYOR COMM NOTE
"UR DEPARTMENT    Emilie Pearson, RN  Phone [phone]  Fax [phone]    [address]  [place] [address]    NPI [id number]  TAX ID [id number]    PHYSICIAN NAME AND NPI  DAYRON BRIDGES  [id number]    BED TYPE  INPATIENT TELEMETRY    TYPE OF ADMISSION: ED ADMISSION MEDICAL    ICD 10 CODE R53.83, R41.82    DATE OF ADMISSION 06/13/2024      Isha Llanes (58 y.o. Female)       Date of Birth   1965    Social Security Number       Address   [address]  [address] [facility] [address]    Home Phone   [phone]    MRN   [id number]       Cheondoism   None    Marital Status   Legally                             Admission Date   6/12/24    Admission Type   Emergency    Admitting Provider   Geraldo Gould DO    Attending Provider   Geraldo Gould DO    Department, Room/Bed   [facility] 4TH FLOOR MEDICAL TELEMETRY UNIT, 408/1       Discharge Date       Discharge Disposition       Discharge Destination                                 Attending Provider: Geraldo Gould DO    Allergies: Amoxicillin, Ceclor [Cefaclor], Penicillins, Sulfa Antibiotics, Valium [Diazepam], Ambien [Zolpidem], Aspirin, Ativan [Lorazepam], Benadryl [Diphenhydramine], Biaxin [Clarithromycin], Cephalexin, Clindamycin, Compazine [Prochlorperazine Edisylate], Contrast Dye (Echo Or Unknown Ct/mr), Doxycycline, Nsaids, Phenergan [Promethazine Hcl], Promethazine, Vancomycin    Isolation: None   Infection: None   Code Status: CPR    Ht: 167.6 cm (66\")   Wt: 58.6 kg (129 lb 3 oz)    Admission Cmt: None   Principal Problem: AMS (altered mental status) [R41.82]                   Active Insurance as of 6/12/2024       Primary Coverage       Payor Plan Insurance Group Employer/Plan Group    [facility] BY HEIDE [facility] JASWINDER JAEGER [id number]       Payor Plan Address Payor Plan Phone Number Payor Plan Fax Number Effective Dates    [address] [address]   1/1/2021 - None Entered    [facility]" [id number]         Subscriber Name Subscriber Birth Date Member ID       RENÉE ABARCA 1965 [id number]                     Emergency Contacts        (Rel.) Home Phone Work Phone Mobile Phone    Sheila aWtson (Daughter) [phone] -- [phone]           Neurology GRG Clinical Indications for Admission to Inpatient Care       Indications Met   Last updated by Reba Steen RN on 6/13/2024 0852     Review Status Created By   Primary Completed Reba Steen RN      Criteria Review   Neurology GRG Clinical Indications for Admission to Inpatient Care     Overall Determination: Indications Met     Criteria:  [×] Hospital admission is needed for appropriate care of the patient because of  1 or more  of the following  (1):      [×] Altered mental status that is severe or persistent          6/13/2024  8:52 AM              -- 6/13/2024  8:52 AM by Reba Steen RN --                                    (X) Altered mental status (ie, different from baseline) that is severe or persistent, as indicated by  1 or more  of the following  (1) (2) (3) (4):                  (X) Lethargy (awake or arousable, but with drowsiness; reduced awareness of self and environment) that persists (eg, for more than few hours) despite appropriate treatment (eg, of underlying cause)          6/13/2024  8:52 AM              -- 6/13/2024  8:52 AM by Reba Steen, JONATHON --                  To ED w/lethargy and dyspnea. Sp02 81% on RA. Lethargic to the point she cannot provide history. Rec'd Narcan 2mg and per documentation, patients mentation worsened post-administration.     Notes:  -- 6/13/2024  8:52 AM by Reba Steen, JONATHON --      58 y.o. female with complex medical history presented to ED w/increased lethargy and dyspnea. Lethargy to the point patient could not provide history. Patient with implanted pain pump in addition to PO controlled substances. Narcan 2mg IVP administered with patients mentation worsening  post-administration. Sp02 81%. Sinus Tach @ 126. CT imaging (-). 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 Also with TAVR history. CT Chest with nodular opacities in RML likely infectious or inflammatory.       Inpatient Telemetry      LR @ 75cc/hr      Duoneb q6hr; Supplemental 02; Incentive Spirometry      Empiric PO Zithromax      PT evaluation               Go: TONIA [id number] Tax ID [id number]     History & Physical        Luis Pal MD at 24 0214           [facility]   HISTORY AND PHYSICAL    Patient Name: Isha Llanes  : 1965  MRN: [id number]  Primary Care Physician:  Katia Wright MD  Date of admission: 2024    Subjective  Subjective     Chief Complaint: Altered mental status, shortness of breath    HPI:    Isha Llanes is a 58 y.o. 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  When seen patient was difficult to arouse and lethargic so history was taken via chart review and physician handoff.  At home patient was found to be hypoxic with oxygen saturation 81% and lethargic so she was brought to the ED for further evaluation.  Of note patient has a Dilaudid pain pump, is on benzos, other sedating medications, currently on chemotherapy.  The ED patient was tachycardic and hypoxic on arrival requiring oxygen supplementation via nasal cannula.  Labs showed that he had a normal lactic acid and negative COVID flu RSV.  Urinalysis showed signs of dehydration but did not show a UTI.  CT head was negative for any acute findings.  CT chest without contrast showed findings of emphysema with tree-in-bud nodular opacities.  Patient was admitted for further evaluation and treatment.        Review of  Systems   Patient lethargic and difficult to arouse.  On nasal cannula    Personal History     Past Medical History:   Diagnosis Date   • Anesthesia     REPORTS HAS GOT REAL EMOTIONAL AND HAS BECOME ANGRY BEFORE   • Anxiety    • Aortic stenosis, severe     HAS BIO VALVE REPLACED   • Arthritis    • Asthma    • Back pain    • Blood clotting disorder    • Cancer    • Cancer associated pain     HODGKINS LYMPHOMA   • CHF (congestive heart failure)    • Chronic low back pain with sciatica    • Chronic pain disorder    • COPD (chronic obstructive pulmonary disease)    • Coronary artery disease    • Diabetes mellitus     TYPE 2   • Diabetes mellitus    • Dyspnea on effort    • GERD (gastroesophageal reflux disease)    • H/O lumpectomy    • H/O: hysterectomy    • Hiatal hernia    • History of degenerative disc disease    • History of kidney stones    • History of pulmonary embolus (PE)    • History of transfusion     AS A CHILD NO TRANSFUSION REACTION   • History of transfusion    • Hodgkin's lymphoma     5/19/23  5 YEARS SINCE LAST CHEMO TX   • Hypertension    • Immobility    • Liver disease     DENIES ANY CURRENT ISSUES   • Lupus    • Mitral valve prolapse    • Nausea vomiting and diarrhea 3/7/2024   • Panic disorder    • Pelvic pain    • Peripheral neuropathy    • Personal history of DVT (deep vein thrombosis)    • Presence of intrathecal pump    • PTSD (post-traumatic stress disorder)    • Sacroiliac joint disease    • SI (sacroiliac) joint inflammation    • Sleep apnea    • Venous insufficiency        Past Surgical History:   Procedure Laterality Date   • ABDOMINAL SURGERY     • BACK SURGERY      SPINAL FUSION    • BACK SURGERY     • BLADDER REPAIR     • CARDIAC CATHETERIZATION N/A 03/06/2019    Procedure: Valvuloplasty;  Surgeon: Wayne Johnson MD;  Location: [facility] CATH INVASIVE LOCATION;  Service: Cardiology   • CARDIAC CATHETERIZATION     • CARDIAC CATHETERIZATION Left 5/31/2023    Procedure: Cardiac  Catheterization/Vascular Study;  Surgeon: Poncho Reid MD;  Location: [facility] CATH INVASIVE LOCATION;  Service: Cardiovascular;  Laterality: Left;   • CARDIAC SURGERY     • CARDIAC VALVE REPLACEMENT  2021   • CHOLECYSTECTOMY     • COLONOSCOPY N/A 12/29/2021    Procedure: COLONOSCOPY;  Surgeon: Karine Orlando MD;  Location: [facility] ENDOSCOPY;  Service: Gastroenterology;  Laterality: N/A;  POOR PREP   • COLONOSCOPY N/A 04/13/2022    Procedure: COLONOSCOPY WITH POLYPECTOMY;  Surgeon: Karine Orlando MD;  Location: [facility] ENDOSCOPY;  Service: Gastroenterology;  Laterality: N/A;  COLON POLYP, HEMORRHOIDS, POOR PREP   • COLONOSCOPY     • DIRECT LARYNGOSCOPY, ESOPHAGOSCOPY, BRONCHOSCOPY N/A 5/22/2023    Procedure: DIRECT LARYNGOSCOPY WITH BIOPSIES , ESOPHAGOSCOPY, BRONCHOSCOPY;  Surgeon: Ronnie Mcgarry MD;  Location: [facility] OR OSC;  Service: ENT;  Laterality: N/A;   • ENDOSCOPY N/A 12/29/2021    Procedure: ESOPHAGOGASTRODUODENOSCOPY;  Surgeon: Karine Orlando MD;  Location: [facility] ENDOSCOPY;  Service: Gastroenterology;  Laterality: N/A;  ESOPHAGITIS, GASTRITIS, HIATAL HERNIA   • ENDOSCOPY     • ENDOSCOPY N/A 4/16/2024    Procedure: ESOPHAGOGASTRODUODENOSCOPY WITH BIOPSIES;  Surgeon: Karine Orlando MD;  Location: [facility] ENDOSCOPY;  Service: Gastroenterology;  Laterality: N/A;  ESOPHAGITIS, HIATAL HERNIA   • HYSTERECTOMY     • LYMPHADENECTOMY     • PAIN PUMP INSERTION/REVISION N/A 10/18/2019    Procedure: PAIN PUMP INSERTION [facility] 10-18-19 [place];  Surgeon: Horace Rios MD;  [facility];  Service: Pain Management   • PAIN PUMP INSERTION/REVISION N/A 11/23/2020    Procedure: pain pump removal;  Surgeon: Horace Rios MD;  Location: [facility] OR;  Service: Pain Management;  Laterality: N/A;   • PEG TUBE INSERTION N/A 7/26/2023    Procedure: PERCUTANEOUS ENDOSCOPIC GASTROSTOMY TUBE INSERTION;  Surgeon: Kody Mercer MD;  Location: [facility] ENDOSCOPY;   Service: General;  Laterality: N/A;  SUCCESSFUL PEG TUBE PLACE PLACEMENT   • PORTACATH PLACEMENT      IN LEFT CHEST REPORTS THAT IT IS NOT FUNCTIONING   • SKIN BIOPSY     • TONSILLECTOMY     • TUBAL ABDOMINAL LIGATION     • TUMOR REMOVAL         Family History: family history includes ADD / ADHD in her brother, granddaughter, grandson, and nephew; Anxiety disorder in her mother; Bipolar disorder in her sister; Depression in her sister; Heart failure in her mother; Pancreatic cancer in her paternal grandmother and sister; Prostate cancer in her father; Thyroid cancer in her maternal grandmother. Otherwise pertinent FHx was reviewed and not pertinent to current issue.    Social History:  reports that she has been smoking cigarettes. She started smoking about 45 years ago. She has a 68.2 pack-year smoking history. She has been exposed to tobacco smoke. She has never used smokeless tobacco. She reports that she does not drink alcohol and does not use drugs.    Home Medications:  ALPRAZolam, HYDROmorphone, O2, OneTouch Delica Plus [id number], acetaminophen, albuterol, albuterol sulfate HFA, bacitracin, cetirizine, clopidogrel, famotidine, fluconazole, furosemide, gabapentin, glucose blood, insulin degludec, megestrol, midodrine, mupirocin, nicotine polacrilex, ondansetron ODT, pain, and predniSONE      Allergies:  Allergies   Allergen Reactions   • Amoxicillin Shortness Of Breath     Has tolerated Cefazolin, Ceftriaxone, and Cefepime -Jermaine Melida, [facility]   • Ceclor [Cefaclor] Shortness Of Breath     Has tolerated Cefazolin, Ceftriax[facility], and [facility]     • Penicillins Shortness Of Breath     Has tolerated Cefazolin, Ceftriaxone, and Cefepime -Jermaine Montez, [facility]   • Sulfa Antibiotics Rash   • Valium [Diazepam] Mental Status Change     DEPRESSED   • Ambien [Zolpidem] Mental Status Change   • Aspirin GI Intolerance   • Ativan [Lorazepam] Mental Status Change   • Benadryl [Diphenhydramine] Anxiety      AND MAKES HER HYPER   • Biaxin [Clarithromycin] Unknown - Low Severity   • Cephalexin Unknown - Low Severity   • Clindamycin Unknown - Low Severity   • Compazine [Prochlorperazine Edisylate] Rash   • Contrast Dye (Echo Or Unknown Ct/Mr) Other (See Comments)     Caused pain in her arm   • Doxycycline Rash   • Nsaids GI Intolerance   • Phenergan [Promethazine Hcl] GI Intolerance   • Promethazine GI Intolerance   • Vancomycin Itching       Objective  Objective     Vitals:   Heart Rate:  [] 126  Resp:  [16-26] 26  BP: ()/(59-78) 102/73  Flow (L/min):  [3] 3  Physical Exam    Constitutional: Sleeping, lethargic, difficult to arouse, vital stable on nasal cannula   Eyes: PERRLA, sclerae anicteric, no conjunctival injection   HENT: NCAT, dry mucous membrane   Neck: Supple, no thyromegaly, no lymphadenopathy, trachea midline   Respiratory: Decreased breath sounds bilaterally   Cardiovascular: RRR, no murmurs, rubs, or gallops, palpable pedal pulses bilaterally   Gastrointestinal: Positive bowel sounds, soft, nontender, nondistended   Musculoskeletal: Bilateral lower extremity edema no clubbing or cyanosis to extremities   Psychiatric: Able to evaluate   Neurologic: Lethargic, pupils reactive   Skin: No rashes     Result Review   Result Review:  I have personally reviewed the results from the time of this admission to 6/13/2024 02:15 EDT and agree with these findings:  [x]  Laboratory list / accordion  []  Microbiology  [x]  Radiology  [x]  EKG/Telemetry   []  Cardiology/Vascular   []  Pathology  []  Old records  []  Other:  Most notable findings include: Normal lactic acid, negative COVID flu RSV, signs of dehydration on UA, negative troponin, CT head negative, chest x-ray with tree-in-bud opacities      Assessment & Plan  Assessment / Plan     Brief Patient Summary:  Isha Llanes is a 58 y.o. female with past medical history diabetes, hypertension, squamous cell carcinoma of the larynx on  chemotherapy, lupus, Hodgkin's lymphoma, CAD, PTSD, aortic stenosis status post TAVR, GERD was brought to the ED for further evaluation of altered mental status and shortness of breath.    Active Hospital Problems:  Active Hospital Problems    Diagnosis    • **AMS (altered mental status)    • Dysphagia    • Malignant neoplasm of supraglottis    • Hodgkin lymphoma    • Tobacco abuse    • Laryngeal cancer    • Noncompliance    • Depression    • S/P TAVR (transcatheter aortic valve replacement)    • CAD (coronary artery disease)    • CHF (congestive heart failure)    • Diabetes mellitus, type 2    • Chronic obstructive pulmonary disease    • Cancer associated pain      Plan:     Altered mental status  -Admit to telemetry  -She became much more lethargic in ED after Narcan dose  -Polypharmacy possible  -CT head negative  -Signs of dehydration on physical exam and UA  -On chemotherapy  -CT chest with infectious process  -Empiric antibiotics  -IVF  -Fall precautions  -Hold home meds  -Follow labs  -PT  -Supportive care    Diabetes  -Insulin sliding scale  -Levemir at bedtime  -Titrate as needed    COPD  -DuoNebs  -Incentive spirometry  -Submental oxygen as needed  -VBG reviewed  -Follow-up ABG if needed    Squamous cell carcinoma of the larynx   -On chemo  -Pain medicines when warranted  -Consult oncology if needed    History of Hodgkin's lymphoma  CAD  Aortic stenosis status post TAVR  Dysphagia      GI ppx  DVT ppx    CODE STATUS: Full code     Admission Status:  I believe this patient meets inpatient status.      Electronically signed by Luis Pal MD, 06/13/24, 2:15 AM EDT.    Electronically signed by Luis Pal MD at 06/13/24 0239          Emergency Department Notes        Makenzie Xie MD at 06/13/24 0106          Time: 1:06 AM EDT  Date of encounter:  6/12/2024  Independent Historian/Clinical History and Information was obtained by:   Nursing Staff    History is limited by: Altered Mental  Status    Chief Complaint: Altered mental status      History of Present Illness:  Patient is a 58 y.o. year old female who presents to the emergency department for evaluation of dyspnea and altered mental status.  The patient was found to be hypoxic to 81%.  Patient also has had lower extremity edema.  Patient is somewhat lethargic and unable to provide history.    HPI    Patient Care Team  Primary Care Provider: Katia Wright MD    Past Medical History:     Allergies   Allergen Reactions   • Amoxicillin Shortness Of Breath     Has tolerated Cefazolin, Ceftriaxone, and Cefepime -Jermaine Melida, [facility]   • Ceclor [Cefaclor] Shortness Of Breath     Has tolerated Cefazolin, Ceftriaxone, and Cefepime -Jermaine Melida, [facility]     • Penicillins Shortness Of Breath     Has tolerated Cefazolin, Ceftriaxone, and Cefepime -Jermaine Melida, [facility] ED   • Sulfa Antibiotics Rash   • Valium [Diazepam] Mental Status Change     DEPRESSED   • Ambien [Zolpidem] Mental Status Change   • Aspirin GI Intolerance   • Ativan [Lorazepam] Mental Status Change   • Benadryl [Diphenhydramine] Anxiety     AND MAKES HER HYPER   • Biaxin [Clarithromycin] Unknown - Low Severity   • Cephalexin Unknown - Low Severity   • Clindamycin Unknown - Low Severity   • Compazine [Prochlorperazine Edisylate] Rash   • Contrast Dye (Echo Or Unknown Ct/Mr) Other (See Comments)     Caused pain in her arm   • Doxycycline Rash   • Nsaids GI Intolerance   • Phenergan [Promethazine Hcl] GI Intolerance   • Promethazine GI Intolerance   • Vancomycin Itching     Past Medical History:   Diagnosis Date   • Anesthesia     REPORTS HAS GOT REAL EMOTIONAL AND HAS BECOME ANGRY BEFORE   • Anxiety    • Aortic stenosis, severe     HAS BIO VALVE REPLACED   • Arthritis    • Asthma    • Back pain    • Blood clotting disorder    • Cancer    • Cancer associated pain     HODGKINS LYMPHOMA   • CHF (congestive heart failure)    • Chronic low back pain with sciatica    • Chronic pain disorder    •  COPD (chronic obstructive pulmonary disease)    • Coronary artery disease    • Diabetes mellitus     TYPE 2   • Diabetes mellitus    • Dyspnea on effort    • GERD (gastroesophageal reflux disease)    • H/O lumpectomy    • H/O: hysterectomy    • Hiatal hernia    • History of degenerative disc disease    • History of kidney stones    • History of pulmonary embolus (PE)    • History of transfusion     AS A CHILD NO TRANSFUSION REACTION   • History of transfusion    • Hodgkin's lymphoma     5/19/23  5 YEARS SINCE LAST CHEMO TX   • Hypertension    • Immobility    • Liver disease     DENIES ANY CURRENT ISSUES   • Lupus    • Mitral valve prolapse    • Nausea vomiting and diarrhea 3/7/2024   • Panic disorder    • Pelvic pain    • Peripheral neuropathy    • Personal history of DVT (deep vein thrombosis)    • Presence of intrathecal pump    • PTSD (post-traumatic stress disorder)    • Sacroiliac joint disease    • SI (sacroiliac) joint inflammation    • Sleep apnea    • Venous insufficiency      Past Surgical History:   Procedure Laterality Date   • ABDOMINAL SURGERY     • BACK SURGERY      SPINAL FUSION    • BACK SURGERY     • BLADDER REPAIR     • CARDIAC CATHETERIZATION N/A 03/06/2019    Procedure: Valvuloplasty;  Surgeon: Wayne Johnson MD;  Location: [facility] INVASIVE LOCATION;  Service: Cardiology   • CARDIAC CATHETERIZATION     • CARDIAC CATHETERIZATION Left 5/31/2023    Procedure: Cardiac Catheterization/Vascular Study;  Surgeon: Poncho Reid MD;  Location: [facility] CATH INVASIVE LOCATION;  Service: Cardiovascular;  Laterality: Left;   • CARDIAC SURGERY     • CARDIAC VALVE REPLACEMENT  2021   • CHOLECYSTECTOMY     • COLONOSCOPY N/A 12/29/2021    Procedure: COLONOSCOPY;  Surgeon: Karine Orlando MD;  Location: [facility] ENDOSCOPY;  Service: Gastroenterology;  Laterality: N/A;  POOR PREP   • COLONOSCOPY N/A 04/13/2022    Procedure: COLONOSCOPY WITH POLYPECTOMY;  Surgeon: Karine Orlando  MD;  Location: [facility] ENDOSCOPY;  Service: Gastroenterology;  Laterality: N/A;  COLON POLYP, HEMORRHOIDS, POOR PREP   • COLONOSCOPY     • DIRECT LARYNGOSCOPY, ESOPHAGOSCOPY, BRONCHOSCOPY N/A 5/22/2023    Procedure: DIRECT LARYNGOSCOPY WITH BIOPSIES , ESOPHAGOSCOPY, BRONCHOSCOPY;  Surgeon: Ronnie Mcgarry MD;  Location: [facility] OR OSC;  Service: ENT;  Laterality: N/A;   • ENDOSCOPY N/A 12/29/2021    Procedure: ESOPHAGOGASTRODUODENOSCOPY;  Surgeon: Karine Orlando MD;  Location: [facility] ENDOSCOPY;  Service: Gastroenterology;  Laterality: N/A;  ESOPHAGITIS, GASTRITIS, HIATAL HERNIA   • ENDOSCOPY     • ENDOSCOPY N/A 4/16/2024    Procedure: ESOPHAGOGASTRODUODENOSCOPY WITH BIOPSIES;  Surgeon: Karine Orlando MD;  Location: [facility] ENDOSCOPY;  Service: Gastroenterology;  Laterality: N/A;  ESOPHAGITIS, HIATAL HERNIA   • HYSTERECTOMY     • LYMPHADENECTOMY     • PAIN PUMP INSERTION/REVISION N/A 10/18/2019    Procedure: PAIN PUMP INSERTION [facility] 10-18-19 [place];  Surgeon: Horace Rios MD;  Location: [facility];  Service: Pain Management   • PAIN PUMP INSERTION/REVISION N/A 11/23/2020    Procedure: pain pump removal;  Surgeon: Horace Rios MD;  Location: [facility];  Service: Pain Management;  Laterality: N/A;   • PEG TUBE INSERTION N/A 7/26/2023    Procedure: PERCUTANEOUS ENDOSCOPIC GASTROSTOMY TUBE INSERTION;  Surgeon: Kody Mercer MD;  Location: [facility] ENDOSCOPY;  Service: General;  Laterality: N/A;  SUCCESSFUL PEG TUBE PLACE PLACEMENT   • PORTACATH PLACEMENT      IN LEFT CHEST REPORTS THAT IT IS NOT FUNCTIONING   • SKIN BIOPSY     • TONSILLECTOMY     • TUBAL ABDOMINAL LIGATION     • TUMOR REMOVAL       Family History   Problem Relation Age of Onset   • Heart failure Mother    • Anxiety disorder Mother    • Prostate cancer Father    • Depression Sister    • Bipolar disorder Sister    • Pancreatic cancer Sister    • ADD / ADHD Brother    • Thyroid cancer Maternal Grandmother    •  Pancreatic cancer Paternal Grandmother    • ADD / ADHD Grandson    • ADD / ADHD Granddaughter    • ADD / ADHD Nephew    • Malig Hyperthermia Neg Hx        Home Medications:  Prior to Admission medications    Medication Sig Start Date End Date Taking? Authorizing Provider   acetaminophen (Tylenol) 325 MG tablet Every 4 (Four) Hours.    Nette Jiménez MD   albuterol (PROVENTIL) 2.5 MG/0.5ML nebulizer solution Take 2.5 mg by nebulization Every 4 (Four) Hours As Needed for Wheezing or Shortness of Air. 2/9/24   Virgil Navarro MD   albuterol sulfate  (90 Base) MCG/ACT inhaler Inhale 2 puffs Every 4 (Four) Hours As Needed for Wheezing. 11/20/23   Katia Wright MD   ALPRAZolam (Xanax) 0.5 MG tablet Take 1 tablet by mouth 2 (Two) Times a Day As Needed for Anxiety for up to 60 days. 6/5/24 8/4/24  Katia Wright MD   bacitracin 500 UNIT/GM ointment Apply 1 Application topically to the appropriate area as directed 2 (Two) Times a Day. 4/10/24   Katia Wright MD   cetirizine (zyrTEC) 10 MG tablet Take 1 tablet by mouth Daily. 10/31/23   Nette Jiménez MD   clopidogrel (PLAVIX) 75 MG tablet Take 1 tablet by mouth Daily. 2/21/24   Katia Wright MD   famotidine (Pepcid) 20 MG tablet Take 1 tablet by mouth 2 (Two) Times a Day for 90 days. 5/23/24 8/21/24  Katia Wright MD   fluconazole (DIFLUCAN) 150 MG tablet Take 1 tablet by mouth. 5/23/24   Nette Jiménez MD   furosemide (Lasix) 40 MG tablet Take 1 tablet by mouth 2 (Two) Times a Day. 4/9/24   Katia Wright MD   gabapentin (NEURONTIN) 300 MG capsule  6/6/24   Nette Jiménez MD   glucose blood test strip Use as instructed 2/12/24   Katia Wright MD   HYDROmorphone (Dilaudid) 1 MG/ML liquid liquid Every 6 (Six) Hours.    Nette Jiménez MD   insulin degludec (Tresiba FlexTouch) 100 UNIT/ML solution pen-injector injection     Provider, MD Nette   Lancets (OneTouch Delica Plus [id number]) misc  2/6/24   Provider, MD Nette    megestrol (MEGACE) 20 MG tablet Take 1 tablet by mouth Daily. 5/6/24   Nette Jiménez MD   midodrine (PROAMATINE) 10 MG tablet Take 1 tablet by mouth 3 (Three) Times a Day Before Meals for 30 days. 2/9/24 6/6/24  Virgil Navarro MD   mupirocin (BACTROBAN) 2 % ointment Apply 1 Application topically to the appropriate area as directed 3 (Three) Times a Day. 4/10/24   Katia Wright MD   nicotine polacrilex (COMMIT) 2 MG lozenge Dissolve 1 lozenge in the mouth As Needed for Smoking Cessation. 2/9/24   Virgil Navarro MD   O2 (OXYGEN) Inhale 1 (One) Time.    Nette Jiménez MD   ondansetron ODT (ZOFRAN-ODT) 4 MG disintegrating tablet Place 1 tablet on the tongue Every 8 (Eight) Hours As Needed for Nausea or Vomiting. 5/30/24   Sebastian Gunn MD   ondansetron ODT (ZOFRAN-ODT) 8 MG disintegrating tablet Place 1 tablet on the tongue Every 8 (Eight) Hours As Needed for Nausea or Vomiting. 4/8/24   Sobeida Espinosa APRN   pain patient supplied pump by Intrathecal route Continuous.   Type of Medication: Hydromorphone 15 mg/ml  and Bupivacaine 0.5 mg/ml  Provider:Dr. Boudreaux  Provider number: [phone]  Basal Dose: Hydromorphone 7.518 mg/day Bupivacaine 0.88542 mg/day  ( MAX of Hydromorphone 9.940 mg/ day; Bupivacaine 0.19882 mg /day)  Bolus Dose:Hydromorphone 0.500 mg, Bupivacaine 0.56516 mg  Lockout 3 hours. 1 Bolus per every 3 hours max of 5 bolus per day  Next refill/ Alarm Date- 4/24/24  Pump manufacture:APROOFEDtronic    Nette Jiménez MD   predniSONE (DELTASONE) 10 MG tablet Take 1 tablet by mouth Daily. 5/23/24   Katia Wright MD        Social History:   Social History     Tobacco Use   • Smoking status: Every Day     Current packs/day: 1.50     Average packs/day: 1.5 packs/day for 45.4 years (68.2 ttl pk-yrs)     Types: Cigarettes     Start date: 1979     Passive exposure: Current   • Smokeless tobacco: Never   Vaping Use   • Vaping status: Never Used   Substance Use Topics   • Alcohol use: Never  "  • Drug use: Never         Review of Systems:  Review of Systems   Unable to perform ROS: Mental status change        Physical Exam:  /78   Pulse 97   Resp 18   Ht 167.6 cm (66\")   Wt 59.3 kg (130 lb 11.7 oz)   SpO2 100%   BMI 21.10 kg/m²     Physical Exam  Vitals and nursing note reviewed.   Constitutional:       General: She is not in acute distress.     Appearance: She is not toxic-appearing.      Comments: (+) Lethargic   HENT:      Head: Normocephalic and atraumatic.      Jaw: There is normal jaw occlusion.      Mouth/Throat:      Mouth: Mucous membranes are dry.   Eyes:      General: Lids are normal.      Extraocular Movements: Extraocular movements intact.      Conjunctiva/sclera: Conjunctivae normal.      Pupils: Pupils are equal, round, and reactive to light.   Cardiovascular:      Rate and Rhythm: Normal rate and regular rhythm.      Pulses: Normal pulses.      Heart sounds: Normal heart sounds.   Pulmonary:      Effort: Pulmonary effort is normal. No respiratory distress.      Breath sounds: Normal breath sounds. No wheezing or rhonchi.   Abdominal:      General: Abdomen is flat.      Palpations: Abdomen is soft.      Tenderness: There is no abdominal tenderness. There is no guarding or rebound.   Musculoskeletal:         General: Normal range of motion.      Cervical back: Normal range of motion and neck supple.      Right lower leg: No edema.      Left lower leg: No edema.   Skin:     General: Skin is warm and dry.   Neurological:      Sensory: No sensory deficit.   Psychiatric:         Mood and Affect: Mood normal.                  Procedures:  Procedures      Medical Decision Making:      Comorbidities that affect care:    Cancer    External Notes reviewed:    Previous ED Note: Patient was last seen in the ED for generalized weakness.      The following orders were placed and all results were independently analyzed by me:  Orders Placed This Encounter   Procedures   • Blood Culture - " Blood,   • Blood Culture - Blood,   • COVID PRE-OP / PRE-PROCEDURE SCREENING ORDER (NO ISOLATION) - Swab, Nasopharynx   • COVID-19, FLU A/B, RSV PCR 1 HR TAT - Swab, Nasopharynx   • XR Chest 1 View   • CT Head Without Contrast   • CT Chest Without Contrast Diagnostic   • Stanton Draw   • Comprehensive Metabolic Panel   • Single High Sensitivity Troponin T   • Magnesium   • Urinalysis With Microscopic If Indicated (No Culture) - Urine, Clean Catch   • CBC Auto Differential   • Lactic Acid, Plasma   • Urinalysis, Microscopic Only - Urine, Clean Catch   • Blood Gas, Venous -   • Blood Gas, Venous -   • BNP   • NPO Diet NPO Type: Strict NPO   • Undress & Gown   • Continuous Pulse Oximetry   • Vital Signs   • Orthostatic Blood Pressure   • Straight Cath   • Inpatient Hospitalist Consult   • Oxygen Therapy- Nasal Cannula; Titrate 1-6 LPM Per SpO2; 90 - 95%   • POC Glucose Once   • ECG 12 Lead ED Triage Standing Order; Weak / Dizzy / AMS   • Insert Peripheral IV   • Fall Precautions   • CBC & Differential   • Green Top (Gel)   • Lavender Top   • Gold Top - SST   • Light Blue Top       Medications Given in the Emergency Department:  Medications   sodium chloride 0.9 % flush 10 mL (has no administration in time range)   Naloxone HCl (NARCAN) injection 2 mg (2 mg Intravenous Given 6/13/24 0050)        ED Course:         Labs:    Lab Results (last 24 hours)       Procedure Component Value Units Date/Time    CBC & Differential [[id number]]  (Abnormal) Collected: 06/12/24 2300    Specimen: Blood Updated: 06/12/24 2314    Narrative:      The following orders were created for panel order CBC & Differential.  Procedure                               Abnormality         Status                     ---------                               -----------         ------                     CBC Auto Differential[[id number]]        Abnormal            Final result               Scan Slide[[id number]]                                                                     Please view results for these tests on the individual orders.    Comprehensive Metabolic Panel [[id number]]  (Abnormal) Collected: 06/12/24 2300    Specimen: Blood Updated: 06/13/24 0019     Glucose 119 mg/dL      BUN 11 mg/dL      Creatinine 0.53 mg/dL      Sodium 138 mmol/L      Potassium 3.8 mmol/L      Chloride 98 mmol/L      CO2 30.1 mmol/L      Calcium 9.1 mg/dL      Total Protein 6.4 g/dL      Albumin 3.8 g/dL      ALT (SGPT) 13 U/L      AST (SGOT) 17 U/L      Alkaline Phosphatase 60 U/L      Total Bilirubin 0.5 mg/dL      Globulin 2.6 gm/dL      A/G Ratio 1.5 g/dL      BUN/Creatinine Ratio 20.8     Anion Gap 9.9 mmol/L      eGFR 107.4 mL/min/1.73     Narrative:      GFR Normal >60  Chronic Kidney Disease <60  Kidney Failure <15      Single High Sensitivity Troponin T [[id number]]  (Abnormal) Collected: 06/12/24 2300    Specimen: Blood Updated: 06/13/24 0026     HS Troponin T 19 ng/L     Narrative:      High Sensitive Troponin T Reference Range:  <14.0 ng/L- Negative Female for AMI  <22.0 ng/L- Negative Male for AMI  >=14 - Abnormal Female indicating possible myocardial injury.  >=22 - Abnormal Male indicating possible myocardial injury.   Clinicians would have to utilize clinical acumen, EKG, Troponin, and serial changes to determine if it is an Acute Myocardial Infarction or myocardial injury due to an underlying chronic condition.         Magnesium [[id number]]  (Normal) Collected: 06/12/24 2300    Specimen: Blood Updated: 06/13/24 0019     Magnesium 1.6 mg/dL     CBC Auto Differential [[id number]]  (Abnormal) Collected: 06/12/24 2300    Specimen: Blood Updated: 06/12/24 2314     WBC 3.93 10*3/mm3      RBC 4.76 10*6/mm3      Hemoglobin 13.4 g/dL      Hematocrit 42.8 %      MCV 89.9 fL      MCH 28.2 pg      MCHC 31.3 g/dL      RDW 13.2 %      RDW-SD 43.8 fl      MPV 10.1 fL      Platelets 132 10*3/mm3      Neutrophil % 70.4 %      Lymphocyte % 16.8 %      Monocyte % 10.7 %      Eosinophil %  1.3 %      Basophil % 0.3 %      Immature Grans % 0.5 %      Neutrophils, Absolute 2.77 10*3/mm3      Lymphocytes, Absolute 0.66 10*3/mm3      Monocytes, Absolute 0.42 10*3/mm3      Eosinophils, Absolute 0.05 10*3/mm3      Basophils, Absolute 0.01 10*3/mm3      Immature Grans, Absolute 0.02 10*3/mm3      nRBC 0.0 /100 WBC     BNP [[id number]] Collected: 06/12/24 2300    Specimen: Blood Updated: 06/13/24 0016    Blood Culture - Blood, Arm, Right [[id number]] Collected: 06/12/24 2304    Specimen: Blood from Arm, Right Updated: 06/12/24 2308    Lactic Acid, Plasma [[id number]]  (Normal) Collected: 06/12/24 2304    Specimen: Blood Updated: 06/12/24 2339     Lactate 0.9 mmol/L     COVID PRE-OP / PRE-PROCEDURE SCREENING ORDER (NO ISOLATION) - Swab, Nasopharynx [[id number]]  (Normal) Collected: 06/12/24 2307    Specimen: Swab from Nasopharynx Updated: 06/13/24 0003    Narrative:      The following orders were created for panel order COVID PRE-OP / PRE-PROCEDURE SCREENING ORDER (NO ISOLATION) - Swab, Nasopharynx.  Procedure                               Abnormality         Status                     ---------                               -----------         ------                     COVID-19, FLU A/B, RSV P...[[id number]]  Normal              Final result                 Please view results for these tests on the individual orders.    COVID-19, FLU A/B, RSV PCR 1 HR TAT - Swab, Nasopharynx [[id number]]  (Normal) Collected: 06/12/24 2307    Specimen: Swab from Nasopharynx Updated: 06/13/24 0003     COVID19 Not Detected     Influenza A PCR Not Detected     Influenza B PCR Not Detected     RSV, PCR Not Detected    Narrative:      Fact sheet for providers: https://www.fda.gov/media/403274/download    Fact sheet for patients: https://www.fda.gov/media/569179/download    Test performed by PCR.    Urinalysis With Microscopic If Indicated (No Culture) - Urine, Clean Catch [[id number]]  (Abnormal) Collected: 06/12/24 2308    Specimen:  Urine, Clean Catch Updated: 06/12/24 2333     Color, UA Dark Yellow     Appearance, UA Clear     pH, UA 5.5     Specific Gravity, UA 1.022     Glucose, UA Negative     Ketones, UA Trace     Bilirubin, UA Small (1+)     Blood, UA Negative     Protein, UA Trace     Leuk Esterase, UA Trace     Nitrite, UA Negative     Urobilinogen, UA 1.0 E.U./dL    Urinalysis, Microscopic Only - Urine, Clean Catch [[id number]] Collected: 06/12/24 2308    Specimen: Urine, Clean Catch Updated: 06/12/24 2333     RBC, UA 0-2 /HPF      WBC, UA 0-2 /HPF      Bacteria, UA None Seen /HPF      Squamous Epithelial Cells, UA 0-2 /HPF      Hyaline Casts, UA 0-2 /LPF      Methodology Automated Microscopy    Blood Gas, Venous - [[id number]]  (Abnormal) Collected: 06/12/24 2357    Specimen: Venous Blood Updated: 06/13/24 0001     pH, Venous 7.490 pH Units      pCO2, Venous 35.6 mm Hg      pO2, Venous 45.6 mm Hg      HCO3, Venous 27.1 mmol/L      Base Excess, Venous 3.9 mmol/L      Comment: Serial Number: [id number]:  [id number]        O2 Saturation, Venous 85.1 %      Hemoglobin, Blood Gas 13.7 g/dL      Barometric Pressure for Blood Gas 744.2000 mmHg      Modality Cannula     FIO2 32 %      Flow Rate 3.0000 lpm              Imaging:    XR Chest 1 View    Result Date: 6/12/2024  XR CHEST 1 VW Date of Exam: 6/12/2024 10:47 PM EDT Indication: Weak/Dizzy/AMS triage protocol. Cough. History of laryngeal cancer. Comparison: 4/27/2024 Findings: Cardiac and mediastinal contours are normal. Patient is status post TAVR. Left-sided Port-A-Cath tip is in the SVC. Lungs are clear except for calcified granuloma at the right base. Pulmonary vascularity is normal. No pneumothorax.     No acute cardiopulmonary findings. Electronically Signed: Des Carlson MD  6/12/2024 10:57 PM EDT  Workstation ID: [id number]       Differential Diagnosis and Discussion:    Weakness: Based on the patient's history, signs, and symptoms, the diffential diagnosis includes but is not  limited to meningitis, stroke, sepsis, subarachnoid hemorrhage, intracranial bleeding, encephalitis, acute uti, dehydration, MS, myasthenia gravis, [place], migraine variant, neuromuscular disorders vertigo, electrolyte imbalance, and metabolic disorders.    All labs were reviewed and interpreted by me.  All X-rays impressions were independently interpreted by me.  EKG was interpreted by me.  CT scan radiology impression was interpreted by me.    MDM     The patient´s CBC that was reviewed and interpreted by me shows no abnormalities of critical concern. Of note, there is no anemia requiring a blood transfusion and the platelet count is acceptable.  The patient´s CMP that was reviewed and interpretted by me shows no abnormalities of critical concern. Of note, the patient´s sodium and potassium are acceptable. The patient´s liver enzymes are unremarkable. The patient´s renal function (creatinine) is preserved. The patient has a normal anion gap.  Troponin is negative.  Viral swabs are negative.  Urinalysis is negative for bacteriuria.  Patient continued to be lethargic and decision was made to admit the patient.          Patient Care Considerations:    I considered giving antibiotics, however no bacterial focus of infection was found.      Consultants/Shared Management Plan:    Case was discussed with Dr. Pal who agrees with admission.    Social Determinants of Health:    Patient is independent, reliable, and has access to care.       Disposition and Care Coordination:    Admit:   Through independent evaluation of the patient's history, physical, and imperical data, the patient meets criteria for inpatient admission to the hospital.        Final diagnoses:   Lethargy        ED Disposition       ED Disposition   Decision to Admit    Condition   --    Comment   --               This medical record created using voice recognition software.             Makenzie Xie MD  06/13/24 0134      Electronically  signed by Makenzie Xie MD at 06/13/24 0134       Current Facility-Administered Medications   Medication Dose Route Frequency Provider Last Rate Last Admin   • [START ON 6/14/2024] azithromycin (ZITHROMAX) tablet 250 mg  250 mg Oral Q24H Luis Pal MD       • sennosides-docusate (PERICOLACE) 8.6-50 MG per tablet 2 tablet  2 tablet Oral BID PRN Luis Pal MD        And   • polyethylene glycol (MIRALAX) packet 17 g  17 g Oral Daily PRN Luis Pal MD        And   • bisacodyl (DULCOLAX) EC tablet 5 mg  5 mg Oral Daily PRN Luis Pal MD        And   • bisacodyl (DULCOLAX) suppository 10 mg  10 mg Rectal Daily PRN Luis Pal MD       • dextrose (D50W) (25 g/50 mL) IV injection 25 g  25 g Intravenous Q15 Min PRN Luis Pal MD       • dextrose (GLUTOSE) oral gel 15 g  15 g Oral Q15 Min PRN Luis Pla MD       • Enoxaparin Sodium (LOVENOX) syringe 40 mg  40 mg Subcutaneous Daily Luis Pal MD       • glucagon (GLUCAGEN) injection 1 mg  1 mg Intramuscular Q15 Min PRN Luis Pal MD       • guaiFENesin (MUCINEX) 12 hr tablet 600 mg  600 mg Oral Q12H Luis Pal MD       • guaiFENesin-dextromethorphan (ROBITUSSIN DM) 100-10 MG/5ML syrup 10 mL  10 mL Oral Q6H PRN Luis Pal MD       • Insulin Lispro (humaLOG) injection 2-9 Units  2-9 Units Subcutaneous 4x Daily AC & at Bedtime Luis Pal MD       • ipratropium-albuterol (DUO-NEB) nebulizer solution 3 mL  3 mL Nebulization Q6H - RT Luis Pal MD   3 mL at 06/13/24 0658   • ipratropium-albuterol (DUO-NEB) nebulizer solution 3 mL  3 mL Nebulization Q4H PRN Luis Pal MD       • lactated ringers infusion  75 mL/hr Intravenous Continuous Luis Pal MD 75 mL/hr at 06/13/24 0434 75 mL/hr at 06/13/24 0434   • sodium chloride 0.9 % flush 10 mL  10 mL Intravenous PRN Makenzie Xie MD       • sodium chloride 0.9 % flush 10 mL  10 mL Intravenous Q12H Luis Pal MD       • sodium  chloride 0.9 % flush 10 mL  10 mL Intravenous PRN Luis Pal MD       • sodium chloride 0.9 % infusion 40 mL  40 mL Intravenous PRN Luis Pal MD         Physician Progress Notes (last 24 hours)  Notes from 06/12/24 0912 through 06/13/24 0912   No notes of this type exist for this encounter.       Consult Notes (last 24 hours)  Notes from 06/12/24 0912 through 06/13/24 0912   No notes of this type exist for this encounter.

## 2024-06-13 NOTE — PLAN OF CARE
Problem: Adult Inpatient Plan of Care  Goal: Plan of Care Review  Outcome: Ongoing, Progressing  Flowsheets (Taken 6/13/2024 1610)  Progress: improving  Plan of Care Reviewed With:   patient   daughter  Outcome Evaluation: Patient difficult to arouse but able to answer orientation questions throughout shift. Daughter at bedside this morning. Daughter's concerns were relayed to attending this morning by this RN. Diet advanced per patient tolerance. IV fluid therapy to be discontinued this evening. Order obtained for pain pump use. Home medications administered per MAR. Patient has asked for pain medications multiple times throughout shift, nurse explained to both patient and daughter that narcotics are being held at this time due to patient's altered mental status and unsatisfactory respiratory status at this time. Patient disagreed with medical rationale of withholding narcotics, has been rude to staff and threatened to leave against medical advice. Daughter aware of situation at this time. Patient encouraged to wear bipap tonight and as tolerated to help improve respiratory function. Continue with plan of care.   Goal Outcome Evaluation:  Plan of Care Reviewed With: patient, daughter        Progress: improving  Outcome Evaluation: Patient difficult to arouse but able to answer orientation questions throughout shift. Daughter at bedside this morning. Daughter's concerns were relayed to attending this morning by this RN. Diet advanced per patient tolerance. IV fluid therapy to be discontinued this evening. Order obtained for pain pump use. Home medications administered per MAR. Patient has asked for pain medications multiple times throughout shift, nurse explained to both patient and daughter that narcotics are being held at this time due to patient's altered mental status and unsatisfactory respiratory status at this time. Patient disagreed with medical rationale of withholding narcotics, has been rude to staff  and threatened to leave against medical advice. Daughter aware of situation at this time. Patient encouraged to wear bipap tonight and as tolerated to help improve respiratory function. Continue with plan of care.

## 2024-06-13 NOTE — ED PROVIDER NOTES
Time: 1:06 AM EDT  Date of encounter:  6/12/2024  Independent Historian/Clinical History and Information was obtained by:   Nursing Staff    History is limited by: Altered Mental Status    Chief Complaint: Altered mental status      History of Present Illness:  Patient is a 58 y.o. year old female who presents to the emergency department for evaluation of dyspnea and altered mental status.  The patient was found to be hypoxic to 81%.  Patient also has had lower extremity edema.  Patient is somewhat lethargic and unable to provide history.    HPI    Patient Care Team  Primary Care Provider: Katia Wright MD    Past Medical History:     Allergies   Allergen Reactions    Amoxicillin Shortness Of Breath     Has tolerated Cefazolin, Ceftriaxone, and Cefepime -Jermaine Melida, [facility]    Ceclor [Cefaclor] Shortness Of Breath     Has tolerated Cefazolin, Ceftriaxone, and Cefepime -Jermaine Melida, RP      Penicillins Shortness Of Breath     Has tolerated Cefazolin, Ceftriaxone, and Cefepime -Jermaine Melida, H    Sulfa Antibiotics Rash    Valium [Diazepam] Mental Status Change     DEPRESSED    Ambien [Zolpidem] Mental Status Change    Aspirin GI Intolerance    Ativan [Lorazepam] Mental Status Change    Benadryl [Diphenhydramine] Anxiety     AND MAKES HER HYPER    Biaxin [Clarithromycin] Unknown - Low Severity    Cephalexin Unknown - Low Severity    Clindamycin Unknown - Low Severity    Compazine [Prochlorperazine Edisylate] Rash    Contrast Dye (Echo Or Unknown Ct/Mr) Other (See Comments)     Caused pain in her arm    Doxycycline Rash    Nsaids GI Intolerance    Phenergan [Promethazine Hcl] GI Intolerance    Promethazine GI Intolerance    Vancomycin Itching     Past Medical History:   Diagnosis Date    Anesthesia     REPORTS HAS GOT REAL EMOTIONAL AND HAS BECOME ANGRY BEFORE    Anxiety     Aortic stenosis, severe     HAS BIO VALVE REPLACED    Arthritis     Asthma     Back pain     Blood clotting disorder     Cancer      Cancer associated pain     HODGKINS LYMPHOMA    CHF (congestive heart failure)     Chronic low back pain with sciatica     Chronic pain disorder     COPD (chronic obstructive pulmonary disease)     Coronary artery disease     Diabetes mellitus     TYPE 2    Diabetes mellitus     Dyspnea on effort     GERD (gastroesophageal reflux disease)     H/O lumpectomy     H/O: hysterectomy     Hiatal hernia     History of degenerative disc disease     History of kidney stones     History of pulmonary embolus (PE)     History of transfusion     AS A CHILD NO TRANSFUSION REACTION    History of transfusion     Hodgkin's lymphoma     5/19/23  5 YEARS SINCE LAST CHEMO TX    Hypertension     Immobility     Liver disease     DENIES ANY CURRENT ISSUES    Lupus     Mitral valve prolapse     Nausea vomiting and diarrhea 3/7/2024    Panic disorder     Pelvic pain     Peripheral neuropathy     Personal history of DVT (deep vein thrombosis)     Presence of intrathecal pump     PTSD (post-traumatic stress disorder)     Sacroiliac joint disease     SI (sacroiliac) joint inflammation     Sleep apnea     Venous insufficiency      Past Surgical History:   Procedure Laterality Date    ABDOMINAL SURGERY      BACK SURGERY      SPINAL FUSION     BACK SURGERY      BLADDER REPAIR      CARDIAC CATHETERIZATION N/A 03/06/2019    Procedure: Valvuloplasty;  Surgeon: Wayne Johnson MD;  Location: [facility] CATH INVASIVE LOCATION;  Service: Cardiology    CARDIAC CATHETERIZATION      CARDIAC CATHETERIZATION Left 5/31/2023    Procedure: Cardiac Catheterization/Vascular Study;  Surgeon: Poncho Reid MD;  Location: [facility] CATH INVASIVE LOCATION;  Service: Cardiovascular;  Laterality: Left;    CARDIAC SURGERY      CARDIAC VALVE REPLACEMENT  2021    CHOLECYSTECTOMY      COLONOSCOPY N/A 12/29/2021    Procedure: COLONOSCOPY;  Surgeon: Karine Orlando MD;  Location: [facility] ENDOSCOPY;  Service: Gastroenterology;  Laterality: N/A;  POOR PREP     COLONOSCOPY N/A 04/13/2022    Procedure: COLONOSCOPY WITH POLYPECTOMY;  Surgeon: Karine Orlando MD;  Location: [facility] ENDOSCOPY;  Service: Gastroenterology;  Laterality: N/A;  COLON POLYP, HEMORRHOIDS, POOR PREP    COLONOSCOPY      DIRECT LARYNGOSCOPY, ESOPHAGOSCOPY, BRONCHOSCOPY N/A 5/22/2023    Procedure: DIRECT LARYNGOSCOPY WITH BIOPSIES , ESOPHAGOSCOPY, BRONCHOSCOPY;  Surgeon: Ronnie Mcgarry MD;  Location: [facility] OR OSC;  Service: ENT;  Laterality: N/A;    ENDOSCOPY N/A 12/29/2021    Procedure: ESOPHAGOGASTRODUODENOSCOPY;  Surgeon: Karine Orlando MD;  Location: [facility] ENDOSCOPY;  Service: Gastroenterology;  Laterality: N/A;  ESOPHAGITIS, GASTRITIS, HIATAL HERNIA    ENDOSCOPY      ENDOSCOPY N/A 4/16/2024    Procedure: ESOPHAGOGASTRODUODENOSCOPY WITH BIOPSIES;  Surgeon: Karine Orlando MD;  Location: [facility] ENDOSCOPY;  Service: Gastroenterology;  Laterality: N/A;  ESOPHAGITIS, HIATAL HERNIA    HYSTERECTOMY      LYMPHADENECTOMY      PAIN PUMP INSERTION/REVISION N/A 10/18/2019    Procedure: PAIN PUMP INSERTION [facility] 10-18-19 [place];  Surgeon: Horace Rios MD;  Location: [facility] OR;  Service: Pain Management    PAIN PUMP INSERTION/REVISION N/A 11/23/2020    Procedure: pain pump removal;  Surgeon: Horace Rios MD;  Location: [facility] OR;  Service: Pain Management;  Laterality: N/A;    PEG TUBE INSERTION N/A 7/26/2023    Procedure: PERCUTANEOUS ENDOSCOPIC GASTROSTOMY TUBE INSERTION;  Surgeon: Kody Mercer MD;  Location: [facility] ENDOSCOPY;  Service: General;  Laterality: N/A;  SUCCESSFUL PEG TUBE PLACE PLACEMENT    PORTACATH PLACEMENT      IN LEFT CHEST REPORTS THAT IT IS NOT FUNCTIONING    SKIN BIOPSY      TONSILLECTOMY      TUBAL ABDOMINAL LIGATION      TUMOR REMOVAL       Family History   Problem Relation Age of Onset    Heart failure Mother     Anxiety disorder Mother     Prostate cancer Father     Depression Sister     Bipolar disorder Sister     Pancreatic  cancer Sister     ADD / ADHD Brother     Thyroid cancer Maternal Grandmother     Pancreatic cancer Paternal Grandmother     ADD / ADHD Grandson     ADD / ADHD Granddaughter     ADD / ADHD Nephew     Malig Hyperthermia Neg Hx        Home Medications:  Prior to Admission medications    Medication Sig Start Date End Date Taking? Authorizing Provider   acetaminophen (Tylenol) 325 MG tablet Every 4 (Four) Hours.    Nette Jiménez MD   albuterol (PROVENTIL) 2.5 MG/0.5ML nebulizer solution Take 2.5 mg by nebulization Every 4 (Four) Hours As Needed for Wheezing or Shortness of Air. 2/9/24   Virgil Navarro MD   albuterol sulfate  (90 Base) MCG/ACT inhaler Inhale 2 puffs Every 4 (Four) Hours As Needed for Wheezing. 11/20/23   Katia Wright MD   ALPRAZolam (Xanax) 0.5 MG tablet Take 1 tablet by mouth 2 (Two) Times a Day As Needed for Anxiety for up to 60 days. 6/5/24 8/4/24  Katia Wright MD   bacitracin 500 UNIT/GM ointment Apply 1 Application topically to the appropriate area as directed 2 (Two) Times a Day. 4/10/24   Katia Wright MD   cetirizine (zyrTEC) 10 MG tablet Take 1 tablet by mouth Daily. 10/31/23   Nette Jiménez MD   clopidogrel (PLAVIX) 75 MG tablet Take 1 tablet by mouth Daily. 2/21/24   Katia Wright MD   famotidine (Pepcid) 20 MG tablet Take 1 tablet by mouth 2 (Two) Times a Day for 90 days. 5/23/24 8/21/24  Katia Wright MD   fluconazole (DIFLUCAN) 150 MG tablet Take 1 tablet by mouth. 5/23/24   Nette Jiménez MD   furosemide (Lasix) 40 MG tablet Take 1 tablet by mouth 2 (Two) Times a Day. 4/9/24   Katia Wright MD   gabapentin (NEURONTIN) 300 MG capsule  6/6/24   Nette Jiménez MD   glucose blood test strip Use as instructed 2/12/24   Katia Wright MD   HYDROmorphone (Dilaudid) 1 MG/ML liquid liquid Every 6 (Six) Hours.    Nette Jiménez MD   insulin degludec (Tresiba FlexTouch) 100 UNIT/ML solution pen-injector injection     Provider, Historical, MD   Lancets  (OneTouch Delica Plus [id number]) misc  2/6/24   Nette Jiménez MD   megestrol (MEGACE) 20 MG tablet Take 1 tablet by mouth Daily. 5/6/24   Nette Jiménez MD   midodrine (PROAMATINE) 10 MG tablet Take 1 tablet by mouth 3 (Three) Times a Day Before Meals for 30 days. 2/9/24 6/6/24  Virgil Navarro MD   mupirocin (BACTROBAN) 2 % ointment Apply 1 Application topically to the appropriate area as directed 3 (Three) Times a Day. 4/10/24   Katia Wright MD   nicotine polacrilex (COMMIT) 2 MG lozenge Dissolve 1 lozenge in the mouth As Needed for Smoking Cessation. 2/9/24   Virgil Navarro MD   O2 (OXYGEN) Inhale 1 (One) Time.    Nette Jiménez MD   ondansetron ODT (ZOFRAN-ODT) 4 MG disintegrating tablet Place 1 tablet on the tongue Every 8 (Eight) Hours As Needed for Nausea or Vomiting. 5/30/24   Sebastian Gnun MD   ondansetron ODT (ZOFRAN-ODT) 8 MG disintegrating tablet Place 1 tablet on the tongue Every 8 (Eight) Hours As Needed for Nausea or Vomiting. 4/8/24   Sobeida Espinosa APRN   pain patient supplied pump by Intrathecal route Continuous.   Type of Medication: Hydromorphone 15 mg/ml  and Bupivacaine 0.5 mg/ml  Provider:Dr. Boudreaux  Provider number: [phone]  Basal Dose: Hydromorphone 7.518 mg/day Bupivacaine 0.02381 mg/day  ( MAX of Hydromorphone 9.940 mg/ day; Bupivacaine 0.04648 mg /day)  Bolus Dose:Hydromorphone 0.500 mg, Bupivacaine 0.97924 mg  Lockout 3 hours. 1 Bolus per every 3 hours max of 5 bolus per day  Next refill/ Alarm Date- 4/24/24  Pump manufacture:HomeTouch    Nette Jiménez MD   predniSONE (DELTASONE) 10 MG tablet Take 1 tablet by mouth Daily. 5/23/24   Katia Wright MD        Social History:   Social History     Tobacco Use    Smoking status: Every Day     Current packs/day: 1.50     Average packs/day: 1.5 packs/day for 45.4 years (68.2 ttl pk-yrs)     Types: Cigarettes     Start date: 1979     Passive exposure: Current    Smokeless tobacco: Never   Vaping Use     "Vaping status: Never Used   Substance Use Topics    Alcohol use: Never    Drug use: Never         Review of Systems:  Review of Systems   Unable to perform ROS: Mental status change        Physical Exam:  /78   Pulse 97   Resp 18   Ht 167.6 cm (66\")   Wt 59.3 kg (130 lb 11.7 oz)   SpO2 100%   BMI 21.10 kg/m²     Physical Exam  Vitals and nursing note reviewed.   Constitutional:       General: She is not in acute distress.     Appearance: She is not toxic-appearing.      Comments: (+) Lethargic   HENT:      Head: Normocephalic and atraumatic.      Jaw: There is normal jaw occlusion.      Mouth/Throat:      Mouth: Mucous membranes are dry.   Eyes:      General: Lids are normal.      Extraocular Movements: Extraocular movements intact.      Conjunctiva/sclera: Conjunctivae normal.      Pupils: Pupils are equal, round, and reactive to light.   Cardiovascular:      Rate and Rhythm: Normal rate and regular rhythm.      Pulses: Normal pulses.      Heart sounds: Normal heart sounds.   Pulmonary:      Effort: Pulmonary effort is normal. No respiratory distress.      Breath sounds: Normal breath sounds. No wheezing or rhonchi.   Abdominal:      General: Abdomen is flat.      Palpations: Abdomen is soft.      Tenderness: There is no abdominal tenderness. There is no guarding or rebound.   Musculoskeletal:         General: Normal range of motion.      Cervical back: Normal range of motion and neck supple.      Right lower leg: No edema.      Left lower leg: No edema.   Skin:     General: Skin is warm and dry.   Neurological:      Sensory: No sensory deficit.   Psychiatric:         Mood and Affect: Mood normal.                  Procedures:  Procedures      Medical Decision Making:      Comorbidities that affect care:    Cancer    External Notes reviewed:    Previous ED Note: Patient was last seen in the ED for generalized weakness.      The following orders were placed and all results were independently analyzed by " me:  Orders Placed This Encounter   Procedures    Blood Culture - Blood,    Blood Culture - Blood,    COVID PRE-OP / PRE-PROCEDURE SCREENING ORDER (NO ISOLATION) - Swab, Nasopharynx    COVID-19, FLU A/B, RSV PCR 1 HR TAT - Swab, Nasopharynx    XR Chest 1 View    CT Head Without Contrast    CT Chest Without Contrast Diagnostic    [place] Draw    Comprehensive Metabolic Panel    Single High Sensitivity Troponin T    Magnesium    Urinalysis With Microscopic If Indicated (No Culture) - Urine, Clean Catch    CBC Auto Differential    Lactic Acid, Plasma    Urinalysis, Microscopic Only - Urine, Clean Catch    Blood Gas, Venous -    Blood Gas, Venous -    BNP    NPO Diet NPO Type: Strict NPO    Undress & Gown    Continuous Pulse Oximetry    Vital Signs    Orthostatic Blood Pressure    Straight Cath    Inpatient Hospitalist Consult    Oxygen Therapy- Nasal Cannula; Titrate 1-6 LPM Per SpO2; 90 - 95%    POC Glucose Once    ECG 12 Lead ED Triage Standing Order; Weak / Dizzy / AMS    Insert Peripheral IV    Fall Precautions    CBC & Differential    Green Top (Gel)    Lavender Top    Gold Top - SST    Light Blue Top       Medications Given in the Emergency Department:  Medications   sodium chloride 0.9 % flush 10 mL (has no administration in time range)   Naloxone HCl (NARCAN) injection 2 mg (2 mg Intravenous Given 6/13/24 0050)        ED Course:         Labs:    Lab Results (last 24 hours)       Procedure Component Value Units Date/Time    CBC & Differential [[id number]]  (Abnormal) Collected: 06/12/24 2300    Specimen: Blood Updated: 06/12/24 2314    Narrative:      The following orders were created for panel order CBC & Differential.  Procedure                               Abnormality         Status                     ---------                               -----------         ------                     CBC Auto Differential[[id number]]        Abnormal            Final result               Scan Slide[[id number]]                                                                     Please view results for these tests on the individual orders.    Comprehensive Metabolic Panel [[id number]]  (Abnormal) Collected: 06/12/24 2300    Specimen: Blood Updated: 06/13/24 0019     Glucose 119 mg/dL      BUN 11 mg/dL      Creatinine 0.53 mg/dL      Sodium 138 mmol/L      Potassium 3.8 mmol/L      Chloride 98 mmol/L      CO2 30.1 mmol/L      Calcium 9.1 mg/dL      Total Protein 6.4 g/dL      Albumin 3.8 g/dL      ALT (SGPT) 13 U/L      AST (SGOT) 17 U/L      Alkaline Phosphatase 60 U/L      Total Bilirubin 0.5 mg/dL      Globulin 2.6 gm/dL      A/G Ratio 1.5 g/dL      BUN/Creatinine Ratio 20.8     Anion Gap 9.9 mmol/L      eGFR 107.4 mL/min/1.73     Narrative:      GFR Normal >60  Chronic Kidney Disease <60  Kidney Failure <15      Single High Sensitivity Troponin T [[id number]]  (Abnormal) Collected: 06/12/24 2300    Specimen: Blood Updated: 06/13/24 0026     HS Troponin T 19 ng/L     Narrative:      High Sensitive Troponin T Reference Range:  <14.0 ng/L- Negative Female for AMI  <22.0 ng/L- Negative Male for AMI  >=14 - Abnormal Female indicating possible myocardial injury.  >=22 - Abnormal Male indicating possible myocardial injury.   Clinicians would have to utilize clinical acumen, EKG, Troponin, and serial changes to determine if it is an Acute Myocardial Infarction or myocardial injury due to an underlying chronic condition.         Magnesium [[id number]]  (Normal) Collected: 06/12/24 2300    Specimen: Blood Updated: 06/13/24 0019     Magnesium 1.6 mg/dL     CBC Auto Differential [[id number]]  (Abnormal) Collected: 06/12/24 2300    Specimen: Blood Updated: 06/12/24 2314     WBC 3.93 10*3/mm3      RBC 4.76 10*6/mm3      Hemoglobin 13.4 g/dL      Hematocrit 42.8 %      MCV 89.9 fL      MCH 28.2 pg      MCHC 31.3 g/dL      RDW 13.2 %      RDW-SD 43.8 fl      MPV 10.1 fL      Platelets 132 10*3/mm3      Neutrophil % 70.4 %      Lymphocyte % 16.8 %       Monocyte % 10.7 %      Eosinophil % 1.3 %      Basophil % 0.3 %      Immature Grans % 0.5 %      Neutrophils, Absolute 2.77 10*3/mm3      Lymphocytes, Absolute 0.66 10*3/mm3      Monocytes, Absolute 0.42 10*3/mm3      Eosinophils, Absolute 0.05 10*3/mm3      Basophils, Absolute 0.01 10*3/mm3      Immature Grans, Absolute 0.02 10*3/mm3      nRBC 0.0 /100 WBC     BNP [[id number]] Collected: 06/12/24 2300    Specimen: Blood Updated: 06/13/24 0016    Blood Culture - Blood, Arm, Right [[id number]] Collected: 06/12/24 2304    Specimen: Blood from Arm, Right Updated: 06/12/24 2308    Lactic Acid, Plasma [[id number]]  (Normal) Collected: 06/12/24 2304    Specimen: Blood Updated: 06/12/24 2339     Lactate 0.9 mmol/L     COVID PRE-OP / PRE-PROCEDURE SCREENING ORDER (NO ISOLATION) - Swab, Nasopharynx [[id number]]  (Normal) Collected: 06/12/24 2307    Specimen: Swab from Nasopharynx Updated: 06/13/24 0003    Narrative:      The following orders were created for panel order COVID PRE-OP / PRE-PROCEDURE SCREENING ORDER (NO ISOLATION) - Swab, Nasopharynx.  Procedure                               Abnormality         Status                     ---------                               -----------         ------                     COVID-19, FLU A/B, RSV P...[[id number]]  Normal              Final result                 Please view results for these tests on the individual orders.    COVID-19, FLU A/B, RSV PCR 1 HR TAT - Swab, Nasopharynx [[id number]]  (Normal) Collected: 06/12/24 2307    Specimen: Swab from Nasopharynx Updated: 06/13/24 0003     COVID19 Not Detected     Influenza A PCR Not Detected     Influenza B PCR Not Detected     RSV, PCR Not Detected    Narrative:      Fact sheet for providers: https://www.fda.gov/media/208986/download    Fact sheet for patients: https://www.fda.gov/media/318778/download    Test performed by PCR.    Urinalysis With Microscopic If Indicated (No Culture) - Urine, Clean Catch [[id number]]  (Abnormal)  Collected: 06/12/24 2308    Specimen: Urine, Clean Catch Updated: 06/12/24 2333     Color, UA Dark Yellow     Appearance, UA Clear     pH, UA 5.5     Specific Gravity, UA 1.022     Glucose, UA Negative     Ketones, UA Trace     Bilirubin, UA Small (1+)     Blood, UA Negative     Protein, UA Trace     Leuk Esterase, UA Trace     Nitrite, UA Negative     Urobilinogen, UA 1.0 E.U./dL    Urinalysis, Microscopic Only - Urine, Clean Catch [[id number]] Collected: 06/12/24 2308    Specimen: Urine, Clean Catch Updated: 06/12/24 2333     RBC, UA 0-2 /HPF      WBC, UA 0-2 /HPF      Bacteria, UA None Seen /HPF      Squamous Epithelial Cells, UA 0-2 /HPF      Hyaline Casts, UA 0-2 /LPF      Methodology Automated Microscopy    Blood Gas, Venous - [[id number]]  (Abnormal) Collected: 06/12/24 2357    Specimen: Venous Blood Updated: 06/13/24 0001     pH, Venous 7.490 pH Units      pCO2, Venous 35.6 mm Hg      pO2, Venous 45.6 mm Hg      HCO3, Venous 27.1 mmol/L      Base Excess, Venous 3.9 mmol/L      Comment: Serial Number: [id number]:  [id number]        O2 Saturation, Venous 85.1 %      Hemoglobin, Blood Gas 13.7 g/dL      Barometric Pressure for Blood Gas 744.2000 mmHg      Modality Cannula     FIO2 32 %      Flow Rate 3.0000 lpm              Imaging:    XR Chest 1 View    Result Date: 6/12/2024  XR CHEST 1 VW Date of Exam: 6/12/2024 10:47 PM EDT Indication: Weak/Dizzy/AMS triage protocol. Cough. History of laryngeal cancer. Comparison: 4/27/2024 Findings: Cardiac and mediastinal contours are normal. Patient is status post TAVR. Left-sided Port-A-Cath tip is in the SVC. Lungs are clear except for calcified granuloma at the right base. Pulmonary vascularity is normal. No pneumothorax.     No acute cardiopulmonary findings. Electronically Signed: Des Carlson MD  6/12/2024 10:57 PM EDT  Workstation ID: [id number]       Differential Diagnosis and Discussion:    Weakness: Based on the patient's history, signs, and symptoms, the  diffential diagnosis includes but is not limited to meningitis, stroke, sepsis, subarachnoid hemorrhage, intracranial bleeding, encephalitis, acute uti, dehydration, MS, myasthenia gravis, Guillan [place], migraine variant, neuromuscular disorders vertigo, electrolyte imbalance, and metabolic disorders.    All labs were reviewed and interpreted by me.  All X-rays impressions were independently interpreted by me.  EKG was interpreted by me.  CT scan radiology impression was interpreted by me.    MDM     The patient´s CBC that was reviewed and interpreted by me shows no abnormalities of critical concern. Of note, there is no anemia requiring a blood transfusion and the platelet count is acceptable.  The patient´s CMP that was reviewed and interpretted by me shows no abnormalities of critical concern. Of note, the patient´s sodium and potassium are acceptable. The patient´s liver enzymes are unremarkable. The patient´s renal function (creatinine) is preserved. The patient has a normal anion gap.  Troponin is negative.  Viral swabs are negative.  Urinalysis is negative for bacteriuria.  Patient continued to be lethargic and decision was made to admit the patient.          Patient Care Considerations:    I considered giving antibiotics, however no bacterial focus of infection was found.      Consultants/Shared Management Plan:    Case was discussed with Dr. Pal who agrees with admission.    Social Determinants of Health:    Patient is independent, reliable, and has access to care.       Disposition and Care Coordination:    Admit:   Through independent evaluation of the patient's history, physical, and imperical data, the patient meets criteria for inpatient admission to the hospital.        Final diagnoses:   Lethargy        ED Disposition       ED Disposition   Decision to Admit    Condition   --    Comment   --               This medical record created using voice recognition software.             Rojas  MD Makenzie  06/13/24 0134

## 2024-06-13 NOTE — SIGNIFICANT NOTE
06/13/24 1000   Coping/Psychosocial   Observed Emotional State calm   Verbalized Emotional State anxiety;grief   Trust Relationship/Rapport empathic listening provided   Involvement in Care interacting with patient   Additional Documentation Spiritual Care (Group)   Spiritual Care   Use of Spiritual Resources non-Mormonism use of spiritual care   Spiritual Care Source  initiative   Spiritual Care Follow-Up follow-up, none required as presently assessed   Response to Spiritual Care engagement, unsatisfactory   Spiritual Care Interventions supportive conversation provided   Spiritual Care Visit Type initial   Receptivity to Spiritual Care visit welcomed

## 2024-06-13 NOTE — SIGNIFICANT NOTE
Wound Eval / Progress Noted    [facility] Go     Patient Name: Isha Llanes  : 1965  MRN: [id number]  Today's Date: 2024                 Admit Date: 2024    Visit Dx:    ICD-10-CM ICD-9-CM   1. Lethargy  R53.83 780.79   2. Oropharyngeal dysphagia  R13.12 787.22         AMS (altered mental status)    Cancer associated pain    CHF (congestive heart failure)    Chronic obstructive pulmonary disease    Diabetes mellitus, type 2    Depression    S/P TAVR (transcatheter aortic valve replacement)    CAD (coronary artery disease)    Noncompliance    Tobacco abuse    Laryngeal cancer    Hodgkin lymphoma    Malignant neoplasm of supraglottis    Dysphagia    Acute-on-chronic respiratory failure        Past Medical History:   Diagnosis Date    Anesthesia     REPORTS HAS GOT REAL EMOTIONAL AND HAS BECOME ANGRY BEFORE    Anxiety     Aortic stenosis, severe     HAS BIO VALVE REPLACED    Arthritis     Asthma     Back pain     Blood clotting disorder     Cancer     Cancer associated pain     HODGKINS LYMPHOMA    CHF (congestive heart failure)     Chronic low back pain with sciatica     Chronic pain disorder     COPD (chronic obstructive pulmonary disease)     Coronary artery disease     Diabetes mellitus     TYPE 2    Diabetes mellitus     Dyspnea on effort     GERD (gastroesophageal reflux disease)     H/O lumpectomy     H/O: hysterectomy     Hiatal hernia     History of degenerative disc disease     History of kidney stones     History of pulmonary embolus (PE)     History of transfusion     AS A CHILD NO TRANSFUSION REACTION    History of transfusion     Hodgkin's lymphoma     23  5 YEARS SINCE LAST CHEMO TX    Hypertension     Immobility     Liver disease     DENIES ANY CURRENT ISSUES    Lupus     Mitral valve prolapse     Nausea vomiting and diarrhea 3/7/2024    Panic disorder     Pelvic pain     Peripheral neuropathy     Personal history of DVT (deep vein thrombosis)     Presence of intrathecal pump      PTSD (post-traumatic stress disorder)     Sacroiliac joint disease     SI (sacroiliac) joint inflammation     Sleep apnea     Venous insufficiency         Past Surgical History:   Procedure Laterality Date    ABDOMINAL SURGERY      BACK SURGERY      SPINAL FUSION     BACK SURGERY      BLADDER REPAIR      CARDIAC CATHETERIZATION N/A 03/06/2019    Procedure: Valvuloplasty;  Surgeon: Wayne Johnson MD;  Location: [facility] INVASIVE LOCATION;  Service: Cardiology    CARDIAC CATHETERIZATION      CARDIAC CATHETERIZATION Left 5/31/2023    Procedure: Cardiac Catheterization/Vascular Study;  Surgeon: Poncho Reid MD;  Location: [facility] CATH INVASIVE LOCATION;  Service: Cardiovascular;  Laterality: Left;    CARDIAC SURGERY      CARDIAC VALVE REPLACEMENT  2021    CHOLECYSTECTOMY      COLONOSCOPY N/A 12/29/2021    Procedure: COLONOSCOPY;  Surgeon: Karine Orlando MD;  Location: [facility] ENDOSCOPY;  Service: Gastroenterology;  Laterality: N/A;  POOR PREP    COLONOSCOPY N/A 04/13/2022    Procedure: COLONOSCOPY WITH POLYPECTOMY;  Surgeon: Karine Orlando MD;  Location: [facility] ENDOSCOPY;  Service: Gastroenterology;  Laterality: N/A;  COLON POLYP, HEMORRHOIDS, POOR PREP    COLONOSCOPY      DIRECT LARYNGOSCOPY, ESOPHAGOSCOPY, BRONCHOSCOPY N/A 5/22/2023    Procedure: DIRECT LARYNGOSCOPY WITH BIOPSIES , ESOPHAGOSCOPY, BRONCHOSCOPY;  Surgeon: Ronnie Mcgarry MD;  Location: [facility] OR [facility];  Service: ENT;  Laterality: N/A;    ENDOSCOPY N/A 12/29/2021    Procedure: ESOPHAGOGASTRODUODENOSCOPY;  Surgeon: Karine Orlando MD;  Location: [facility] ENDOSCOPY;  Service: Gastroenterology;  Laterality: N/A;  ESOPHAGITIS, GASTRITIS, HIATAL HERNIA    ENDOSCOPY      ENDOSCOPY N/A 4/16/2024    Procedure: ESOPHAGOGASTRODUODENOSCOPY WITH BIOPSIES;  Surgeon: Karine Orlando MD;  Location: [facility] ENDOSCOPY;  Service: Gastroenterology;  Laterality: N/A;  ESOPHAGITIS, HIATAL HERNIA    HYSTERECTOMY       LYMPHADENECTOMY      PAIN PUMP INSERTION/REVISION N/A 10/18/2019    Procedure: PAIN PUMP INSERTION [facility] 10-18-19 [place];  Surgeon: Horace Riso MD;  Location: [facility] OR;  Service: Pain Management    PAIN PUMP INSERTION/REVISION N/A 11/23/2020    Procedure: pain pump removal;  Surgeon: Horace Rios MD;  Location: [facility] OR;  Service: Pain Management;  Laterality: N/A;    PEG TUBE INSERTION N/A 7/26/2023    Procedure: PERCUTANEOUS ENDOSCOPIC GASTROSTOMY TUBE INSERTION;  Surgeon: Kody Mercer MD;  Location: [facility] ENDOSCOPY;  Service: General;  Laterality: N/A;  SUCCESSFUL PEG TUBE PLACE PLACEMENT    PORTACATH PLACEMENT      IN LEFT CHEST REPORTS THAT IT IS NOT FUNCTIONING    SKIN BIOPSY      TONSILLECTOMY      TUBAL ABDOMINAL LIGATION      TUMOR REMOVAL           Physical Assessment:     06/13/24 1125   Wound 05/26/23 0140 Right calf   Placement Date/Time: 05/26/23 0140   Side: Right  Location: calf   Wound Image    Dressing Appearance open to air   Closure None   Base dry;red;scab   Periwound intact;dry   Periwound Temperature warm   Periwound Skin Turgor firm   Edges rolled/closed   Drainage Amount none   Care, Wound cleansed with;sterile normal saline   Dressing Care open to air   Wound 06/13/24 0400 Left calf   Placement Date/Time: 06/13/24 0400   Side: Left  Location: calf   Wound Image    Dressing Appearance open to air   Closure None   Base dry;red;scab   Periwound intact;dry   Periwound Temperature warm   Periwound Skin Turgor firm   Edges rolled/closed   Drainage Amount none   Care, Wound cleansed with;sterile normal saline   Dressing Care open to air          Wound Check / Follow-up: Patient seen today for wound consult.  Patient is resting quietly with eyes closed upon entry to room.  She does arouse to name.  Explained purpose for visit and patient is agreeable to assessment.  Patient is complaining of back pain and stating that she needs to sit up on the side of the bed.   Patient assisted to side of bed.    Bilateral lower extremities with chronic discoloration.  Lower extremities are red to purple in color.  Skin is very taut and firm.  Is also very dry.  There are scattered areas of crusting noted to bilateral lower extremities.  There is no drainage and no weeping currently noted.  Cleansed with normal saline and gauze no drainage noted.  Will recommend application of topical treatments to rehydrate the skin and to assist with healing along the crusted areas.    Patient then assisted to bedside commode.  During transfer gluteal aspects assessed.  Bilateral gluteal aspects with blanchable redness.  Will recommend topical treatments for skin protection.    Patient is unsteady and is often drifting off while sitting resulting in her leaning forward.  Assisted patient with leaning back to prevent a fall.  Patient states that it only looks like she is going to fall but that she will not fall.  PCA and primary RN aware and assisting patient back to bed.      Impression: Dry taut discolored lower extremities.  Scattered crusted areas to bilateral lower extremities. Blanchable redness to bilateral gluteal aspects.    Short term goals: Skin care/hygiene, skin protection, moisture prevention, pressure reduction.  Topical treatments.  Regain skin integrity.    Elba Cardenas RN    6/13/2024    14:13 EDT

## 2024-06-13 NOTE — H&P
[facility]   HISTORY AND PHYSICAL    Patient Name: Isha Llanes  : 1965  MRN: [id number]  Primary Care Physician:  Katia Wright MD  Date of admission: 2024    Subjective   Subjective     Chief Complaint: Altered mental status, shortness of breath    HPI:    Isha Llanes is a 58 y.o. 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  When seen patient was difficult to arouse and lethargic so history was taken via chart review and physician handoff.  At home patient was found to be hypoxic with oxygen saturation 81% and lethargic so she was brought to the ED for further evaluation.  Of note patient has a Dilaudid pain pump, is on benzos, other sedating medications, currently on chemotherapy.  The ED patient was tachycardic and hypoxic on arrival requiring oxygen supplementation via nasal cannula.  Labs showed that he had a normal lactic acid and negative COVID flu RSV.  Urinalysis showed signs of dehydration but did not show a UTI.  CT head was negative for any acute findings.  CT chest without contrast showed findings of emphysema with tree-in-bud nodular opacities.  Patient was admitted for further evaluation and treatment.        Review of Systems   Patient lethargic and difficult to arouse.  On nasal cannula    Personal History     Past Medical History:   Diagnosis Date    Anesthesia     REPORTS HAS GOT REAL EMOTIONAL AND HAS BECOME ANGRY BEFORE    Anxiety     Aortic stenosis, severe     HAS BIO VALVE REPLACED    Arthritis     Asthma     Back pain     Blood clotting disorder     Cancer     Cancer associated pain     HODGKINS LYMPHOMA    CHF (congestive heart failure)     Chronic low back pain with sciatica     Chronic pain disorder     COPD  (chronic obstructive pulmonary disease)     Coronary artery disease     Diabetes mellitus     TYPE 2    Diabetes mellitus     Dyspnea on effort     GERD (gastroesophageal reflux disease)     H/O lumpectomy     H/O: hysterectomy     Hiatal hernia     History of degenerative disc disease     History of kidney stones     History of pulmonary embolus (PE)     History of transfusion     AS A CHILD NO TRANSFUSION REACTION    History of transfusion     Hodgkin's lymphoma     5/19/23  5 YEARS SINCE LAST CHEMO TX    Hypertension     Immobility     Liver disease     DENIES ANY CURRENT ISSUES    Lupus     Mitral valve prolapse     Nausea vomiting and diarrhea 3/7/2024    Panic disorder     Pelvic pain     Peripheral neuropathy     Personal history of DVT (deep vein thrombosis)     Presence of intrathecal pump     PTSD (post-traumatic stress disorder)     Sacroiliac joint disease     SI (sacroiliac) joint inflammation     Sleep apnea     Venous insufficiency        Past Surgical History:   Procedure Laterality Date    ABDOMINAL SURGERY      BACK SURGERY      SPINAL FUSION     BACK SURGERY      BLADDER REPAIR      CARDIAC CATHETERIZATION N/A 03/06/2019    Procedure: Valvuloplasty;  Surgeon: Wayne Johnson MD;  Location: [facility] INVASIVE LOCATION;  Service: Cardiology    CARDIAC CATHETERIZATION      CARDIAC CATHETERIZATION Left 5/31/2023    Procedure: Cardiac Catheterization/Vascular Study;  Surgeon: Poncho Reid MD;  Location: [facility] CATH INVASIVE LOCATION;  Service: Cardiovascular;  Laterality: Left;    CARDIAC SURGERY      CARDIAC VALVE REPLACEMENT  2021    CHOLECYSTECTOMY      COLONOSCOPY N/A 12/29/2021    Procedure: COLONOSCOPY;  Surgeon: Karine Orlando MD;  Location: [facility] ENDOSCOPY;  Service: Gastroenterology;  Laterality: N/A;  POOR PREP    COLONOSCOPY N/A 04/13/2022    Procedure: COLONOSCOPY WITH POLYPECTOMY;  Surgeon: Karine Orlando MD;  Location: [facility] ENDOSCOPY;  Service:  Gastroenterology;  Laterality: N/A;  COLON POLYP, HEMORRHOIDS, POOR PREP    COLONOSCOPY      DIRECT LARYNGOSCOPY, ESOPHAGOSCOPY, BRONCHOSCOPY N/A 5/22/2023    Procedure: DIRECT LARYNGOSCOPY WITH BIOPSIES , ESOPHAGOSCOPY, BRONCHOSCOPY;  Surgeon: Ronnie Mcgarry MD;  Location: [facility] OR [facility];  Service: ENT;  Laterality: N/A;    ENDOSCOPY N/A 12/29/2021    Procedure: ESOPHAGOGASTRODUODENOSCOPY;  Surgeon: Karine Orlando MD;  Location: [facility] ENDOSCOPY;  Service: Gastroenterology;  Laterality: N/A;  ESOPHAGITIS, GASTRITIS, HIATAL HERNIA    ENDOSCOPY      ENDOSCOPY N/A 4/16/2024    Procedure: ESOPHAGOGASTRODUODENOSCOPY WITH BIOPSIES;  Surgeon: Karine Orlando MD;  Location: [facility] ENDOSCOPY;  Service: Gastroenterology;  Laterality: N/A;  ESOPHAGITIS, HIATAL HERNIA    HYSTERECTOMY      LYMPHADENECTOMY      PAIN PUMP INSERTION/REVISION N/A 10/18/2019    Procedure: PAIN PUMP INSERTION [facility] 10-18-19 [place];  Surgeon: Horace Rios MD;  Location: [facility];  Service: Pain Management    PAIN PUMP INSERTION/REVISION N/A 11/23/2020    Procedure: pain pump removal;  Surgeon: Horace Rios MD;  Location: [facility] OR;  Service: Pain Management;  Laterality: N/A;    PEG TUBE INSERTION N/A 7/26/2023    Procedure: PERCUTANEOUS ENDOSCOPIC GASTROSTOMY TUBE INSERTION;  Surgeon: Kody Mercer MD;  Location: [facility] ENDOSCOPY;  Service: General;  Laterality: N/A;  SUCCESSFUL PEG TUBE PLACE PLACEMENT    PORTACATH PLACEMENT      IN LEFT CHEST REPORTS THAT IT IS NOT FUNCTIONING    SKIN BIOPSY      TONSILLECTOMY      TUBAL ABDOMINAL LIGATION      TUMOR REMOVAL         Family History: family history includes ADD / ADHD in her brother, granddaughter, grandson, and nephew; Anxiety disorder in her mother; Bipolar disorder in her sister; Depression in her sister; Heart failure in her mother; Pancreatic cancer in her paternal grandmother and sister; Prostate cancer in her father; Thyroid cancer in her  maternal grandmother. Otherwise pertinent FHx was reviewed and not pertinent to current issue.    Social History:  reports that she has been smoking cigarettes. She started smoking about 45 years ago. She has a 68.2 pack-year smoking history. She has been exposed to tobacco smoke. She has never used smokeless tobacco. She reports that she does not drink alcohol and does not use drugs.    Home Medications:  ALPRAZolam, HYDROmorphone, O2, OneTouch Delica Plus [id number], acetaminophen, albuterol, albuterol sulfate HFA, bacitracin, cetirizine, clopidogrel, famotidine, fluconazole, furosemide, gabapentin, glucose blood, insulin degludec, megestrol, midodrine, mupirocin, nicotine polacrilex, ondansetron ODT, pain, and predniSONE      Allergies:  Allergies   Allergen Reactions    Amoxicillin Shortness Of Breath     Has tolerated Cefazolin, Ceftriaxone, and Cefepime -Jermaine Melida, RPH    Ceclor [Cefaclor] Shortness Of Breath     Has tolerated Cefazolin, Ceftriaxone, and Cefepime -[facility]      Penicillins Shortness Of Breath     Has tolerated Cefazolin, Ceftriaxone, and Cefepime -Jermaine Melida, RPH    Sulfa Antibiotics Rash    Valium [Diazepam] Mental Status Change     DEPRESSED    Ambien [Zolpidem] Mental Status Change    Aspirin GI Intolerance    Ativan [Lorazepam] Mental Status Change    Benadryl [Diphenhydramine] Anxiety     AND MAKES HER HYPER    Biaxin [Clarithromycin] Unknown - Low Severity    Cephalexin Unknown - Low Severity    Clindamycin Unknown - Low Severity    Compazine [Prochlorperazine Edisylate] Rash    Contrast Dye (Echo Or Unknown Ct/Mr) Other (See Comments)     Caused pain in her arm    Doxycycline Rash    Nsaids GI Intolerance    Phenergan [Promethazine Hcl] GI Intolerance    Promethazine GI Intolerance    Vancomycin Itching       Objective   Objective     Vitals:   Heart Rate:  [] 126  Resp:  [16-26] 26  BP: ()/(59-78) 102/73  Flow (L/min):  [3] 3  Physical  Exam    Constitutional: Sleeping, lethargic, difficult to arouse, vital stable on nasal cannula   Eyes: PERRLA, sclerae anicteric, no conjunctival injection   HENT: NCAT, dry mucous membrane   Neck: Supple, no thyromegaly, no lymphadenopathy, trachea midline   Respiratory: Decreased breath sounds bilaterally   Cardiovascular: RRR, no murmurs, rubs, or gallops, palpable pedal pulses bilaterally   Gastrointestinal: Positive bowel sounds, soft, nontender, nondistended   Musculoskeletal: Bilateral lower extremity edema no clubbing or cyanosis to extremities   Psychiatric: Able to evaluate   Neurologic: Lethargic, pupils reactive   Skin: No rashes     Result Review    Result Review:  I have personally reviewed the results from the time of this admission to 6/13/2024 02:15 EDT and agree with these findings:  [x]  Laboratory list / accordion  []  Microbiology  [x]  Radiology  [x]  EKG/Telemetry   []  Cardiology/Vascular   []  Pathology  []  Old records  []  Other:  Most notable findings include: Normal lactic acid, negative COVID flu RSV, signs of dehydration on UA, negative troponin, CT head negative, chest x-ray with tree-in-bud opacities      Assessment & Plan   Assessment / Plan     Brief Patient Summary:  Isha Llanes is a 58 y.o. 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    Active Hospital Problems:  Active Hospital Problems    Diagnosis     **AMS (altered mental status)     Dysphagia     Malignant neoplasm of supraglottis     Hodgkin lymphoma     Tobacco abuse     Laryngeal cancer     Noncompliance     Depression     S/P TAVR (transcatheter aortic valve replacement)     CAD (coronary artery disease)     CHF (congestive heart failure)     Diabetes mellitus, type 2      Chronic obstructive pulmonary disease     Cancer associated pain      Plan:     Altered mental status  -Admit to telemetry  -She became much more lethargic in ED after Narcan dose  -Polypharmacy possible  -CT head negative  -Signs of dehydration on physical exam and UA  -On chemotherapy  -CT chest with infectious process  -Empiric antibiotics  -IVF  -Fall precautions  -Hold home meds  -Follow labs  -PT  -Supportive care    Diabetes  -Insulin sliding scale  -Levemir at bedtime  -Titrate as needed    COPD  -DuoNebs  -Incentive spirometry  -Submental oxygen as needed  -VBG reviewed  -Follow-up ABG if needed    Squamous cell carcinoma of the larynx   -On chemo  -Pain medicines when warranted  -Consult oncology if needed    History of Hodgkin's lymphoma  CAD  Aortic stenosis status post TAVR  Dysphagia      GI ppx  DVT ppx    CODE STATUS: Full code     Admission Status:  I believe this patient meets inpatient status.      Electronically signed by Luis Pal MD, 06/13/24, 2:15 AM EDT.

## 2024-06-14 ENCOUNTER — READMISSION MANAGEMENT (OUTPATIENT)
Dept: CALL CENTER | Facility: HOSPITAL | Age: 59
End: 2024-06-14
Payer: COMMERCIAL

## 2024-06-14 ENCOUNTER — DOCUMENTATION (OUTPATIENT)
Dept: RADIATION ONCOLOGY | Facility: HOSPITAL | Age: 59
End: 2024-06-14
Payer: COMMERCIAL

## 2024-06-14 VITALS
HEART RATE: 91 BPM | DIASTOLIC BLOOD PRESSURE: 98 MMHG | SYSTOLIC BLOOD PRESSURE: 119 MMHG | WEIGHT: 129.19 LBS | RESPIRATION RATE: 18 BRPM | BODY MASS INDEX: 20.76 KG/M2 | OXYGEN SATURATION: 97 % | HEIGHT: 66 IN | TEMPERATURE: 97.9 F

## 2024-06-14 LAB
ANION GAP SERPL CALCULATED.3IONS-SCNC: 6.1 MMOL/L (ref 5–15)
BUN SERPL-MCNC: 8 MG/DL (ref 6–20)
BUN/CREAT SERPL: 18.6 (ref 7–25)
CALCIUM SPEC-SCNC: 8.8 MG/DL (ref 8.6–10.5)
CHLORIDE SERPL-SCNC: 101 MMOL/L (ref 98–107)
CO2 SERPL-SCNC: 31.9 MMOL/L (ref 22–29)
CORTIS SERPL-MCNC: 4.11 MCG/DL
CREAT SERPL-MCNC: 0.43 MG/DL (ref 0.57–1)
D-LACTATE SERPL-SCNC: 0.7 MMOL/L (ref 0.5–2)
DEPRECATED RDW RBC AUTO: 43.6 FL (ref 37–54)
EGFRCR SERPLBLD CKD-EPI 2021: 112.9 ML/MIN/1.73
ERYTHROCYTE [DISTWIDTH] IN BLOOD BY AUTOMATED COUNT: 13.2 % (ref 12.3–15.4)
GLUCOSE BLDC GLUCOMTR-MCNC: 130 MG/DL (ref 70–99)
GLUCOSE BLDC GLUCOMTR-MCNC: 135 MG/DL (ref 70–99)
GLUCOSE SERPL-MCNC: 94 MG/DL (ref 65–99)
HCT VFR BLD AUTO: 35.9 % (ref 34–46.6)
HGB BLD-MCNC: 11.2 G/DL (ref 12–15.9)
MCH RBC QN AUTO: 28.4 PG (ref 26.6–33)
MCHC RBC AUTO-ENTMCNC: 31.2 G/DL (ref 31.5–35.7)
MCV RBC AUTO: 90.9 FL (ref 79–97)
PLATELET # BLD AUTO: 102 10*3/MM3 (ref 140–450)
PMV BLD AUTO: 10.8 FL (ref 6–12)
POTASSIUM SERPL-SCNC: 3.6 MMOL/L (ref 3.5–5.2)
RBC # BLD AUTO: 3.95 10*6/MM3 (ref 3.77–5.28)
SODIUM SERPL-SCNC: 139 MMOL/L (ref 136–145)
WBC NRBC COR # BLD AUTO: 4.21 10*3/MM3 (ref 3.4–10.8)

## 2024-06-14 PROCEDURE — 85027 COMPLETE CBC AUTOMATED: CPT | Performed by: PHYSICIAN ASSISTANT

## 2024-06-14 PROCEDURE — 94799 UNLISTED PULMONARY SVC/PX: CPT

## 2024-06-14 PROCEDURE — 82948 REAGENT STRIP/BLOOD GLUCOSE: CPT

## 2024-06-14 PROCEDURE — 96376 TX/PRO/DX INJ SAME DRUG ADON: CPT

## 2024-06-14 PROCEDURE — 94664 DEMO&/EVAL PT USE INHALER: CPT

## 2024-06-14 PROCEDURE — 63710000001 PREDNISONE PER 5 MG: Performed by: INTERNAL MEDICINE

## 2024-06-14 PROCEDURE — 51701 INSERT BLADDER CATHETER: CPT

## 2024-06-14 PROCEDURE — 99239 HOSP IP/OBS DSCHRG MGMT >30: CPT | Performed by: INTERNAL MEDICINE

## 2024-06-14 PROCEDURE — 83605 ASSAY OF LACTIC ACID: CPT | Performed by: PHYSICIAN ASSISTANT

## 2024-06-14 PROCEDURE — 25010000002 ONDANSETRON PER 1 MG: Performed by: PHYSICIAN ASSISTANT

## 2024-06-14 PROCEDURE — 51798 US URINE CAPACITY MEASURE: CPT

## 2024-06-14 PROCEDURE — 96372 THER/PROPH/DIAG INJ SC/IM: CPT

## 2024-06-14 PROCEDURE — 25810000003 LACTATED RINGERS PER 1000 ML: Performed by: PHYSICIAN ASSISTANT

## 2024-06-14 PROCEDURE — 82533 TOTAL CORTISOL: CPT | Performed by: INTERNAL MEDICINE

## 2024-06-14 PROCEDURE — 96361 HYDRATE IV INFUSION ADD-ON: CPT

## 2024-06-14 PROCEDURE — 94761 N-INVAS EAR/PLS OXIMETRY MLT: CPT

## 2024-06-14 PROCEDURE — 86225 DNA ANTIBODY NATIVE: CPT | Performed by: INTERNAL MEDICINE

## 2024-06-14 PROCEDURE — 80048 BASIC METABOLIC PNL TOTAL CA: CPT | Performed by: FAMILY MEDICINE

## 2024-06-14 PROCEDURE — 25010000002 ENOXAPARIN PER 10 MG: Performed by: FAMILY MEDICINE

## 2024-06-14 RX ORDER — PREDNISONE 10 MG/1
10 TABLET ORAL DAILY
Qty: 30 TABLET | Refills: 0
Start: 2024-06-14 | End: 2024-07-14

## 2024-06-14 RX ORDER — GABAPENTIN 300 MG/1
300 CAPSULE ORAL 3 TIMES DAILY
Start: 2024-06-14 | End: 2024-06-27

## 2024-06-14 RX ORDER — MIDODRINE HYDROCHLORIDE 10 MG/1
10 TABLET ORAL ONCE
Status: COMPLETED | OUTPATIENT
Start: 2024-06-14 | End: 2024-06-14

## 2024-06-14 RX ORDER — MIDODRINE HYDROCHLORIDE 10 MG/1
10 TABLET ORAL
Status: DISCONTINUED | OUTPATIENT
Start: 2024-06-14 | End: 2024-06-14 | Stop reason: HOSPADM

## 2024-06-14 RX ORDER — AZITHROMYCIN 250 MG/1
250 TABLET, FILM COATED ORAL DAILY
Qty: 3 TABLET | Refills: 0 | Status: SHIPPED | OUTPATIENT
Start: 2024-06-14

## 2024-06-14 RX ORDER — FUROSEMIDE 40 MG/1
40 TABLET ORAL DAILY
Start: 2024-06-14 | End: 2024-07-03 | Stop reason: SDUPTHER

## 2024-06-14 RX ADMIN — WHITE PETROLATUM 1 APPLICATION: 1.75 OINTMENT TOPICAL at 09:53

## 2024-06-14 RX ADMIN — MIDODRINE HYDROCHLORIDE 10 MG: 10 TABLET ORAL at 11:15

## 2024-06-14 RX ADMIN — AZITHROMYCIN DIHYDRATE 250 MG: 250 TABLET ORAL at 08:53

## 2024-06-14 RX ADMIN — SODIUM CHLORIDE, POTASSIUM CHLORIDE, SODIUM LACTATE AND CALCIUM CHLORIDE 125 ML/HR: 600; 310; 30; 20 INJECTION, SOLUTION INTRAVENOUS at 02:51

## 2024-06-14 RX ADMIN — ARFORMOTEROL TARTRATE 15 MCG: 15 SOLUTION RESPIRATORY (INHALATION) at 09:23

## 2024-06-14 RX ADMIN — NICOTINE 1 PATCH: 21 PATCH, EXTENDED RELEASE TRANSDERMAL at 08:55

## 2024-06-14 RX ADMIN — ENOXAPARIN SODIUM 40 MG: 100 INJECTION SUBCUTANEOUS at 08:54

## 2024-06-14 RX ADMIN — PREDNISONE 10 MG: 10 TABLET ORAL at 08:54

## 2024-06-14 RX ADMIN — MIDODRINE HYDROCHLORIDE 10 MG: 10 TABLET ORAL at 00:58

## 2024-06-14 RX ADMIN — MIDODRINE HYDROCHLORIDE 10 MG: 10 TABLET ORAL at 08:54

## 2024-06-14 RX ADMIN — MEGESTROL ACETATE 20 MG: 40 TABLET ORAL at 08:53

## 2024-06-14 RX ADMIN — BUDESONIDE 0.5 MG: 0.5 SUSPENSION RESPIRATORY (INHALATION) at 09:23

## 2024-06-14 RX ADMIN — ONDANSETRON 4 MG: 2 INJECTION INTRAMUSCULAR; INTRAVENOUS at 08:58

## 2024-06-14 RX ADMIN — ACETAMINOPHEN 325 MG: 325 TABLET ORAL at 00:04

## 2024-06-14 RX ADMIN — CLOPIDOGREL BISULFATE 75 MG: 75 TABLET ORAL at 08:54

## 2024-06-14 NOTE — DISCHARGE INSTR - DIET
Diet: Diabetic; Consistent Carbohydrate; No Straw; Texture: Mechanical Ground (NDD 2); Fluid Consistency: Honey Thick

## 2024-06-14 NOTE — PROGRESS NOTES
RT EQUIPMENT DEVICE RELATED - SKIN ASSESSMENT    RT Medical Equipment/Device:     NIV Mask:  Under-the-nose   size:  .    Skin Assessment:      Cheek:  Intact  Chin:  Intact  Nares:  Intact  Neck:  Intact    Device Skin Pressure Protection:  Pressure points protected    Nurse Notification:  Zayda Aguirre, RRT

## 2024-06-14 NOTE — PROGRESS NOTES
RT EQUIPMENT DEVICE RELATED - SKIN ASSESSMENT    RT Medical Equipment/Device:     Nasal Cannula refusing bipap    Skin Assessment:      Cheek:  Intact  Nares:  Intact  Nose:  Intact  Mouth:  Intact    Device Skin Pressure Protection:  Positioning supports utilized, Pressure points protected, and Skin-to-device areas padded:  None Required    Nurse Notification:  Zayda Daigle RRT

## 2024-06-14 NOTE — PROGRESS NOTES
OSW was requested by clinical team at [facility] to assist with arranging patient's transportation for Monday 6/17/24. GRITS stated it was not arranged and it was past the timeframe to schedule a non emergency trip. Clinical team was notified patient did not have medical transportation for Monday's visit.

## 2024-06-14 NOTE — PLAN OF CARE
Goal Outcome Evaluation:  Plan of Care Reviewed With: patient        Progress: improving  Outcome Evaluation: Patient alert and oriented this morning, able to carry on appropriate conversation at this time. Plan of care discussed with patient and physician at bedside. Patient on 2L nasal cannula at this time, no symptoms of respiratory distress noted. Patient verbalized desire to discharge today to nurse and physician, physician agreeable to discharge patient at this time. Patient expected to discharge later this shift.

## 2024-06-14 NOTE — PLAN OF CARE
Goal Outcome Evaluation:  Plan of Care Reviewed With: patient        Progress: no change  Outcome Evaluation: Patient is refusing to wear BIPAP at this time. Patient is resting on 2LNC. Will continue to encourage pt to wear BIPAP as tolerated.

## 2024-06-14 NOTE — OUTREACH NOTE
Prep Survey      Flowsheet Row Responses   [facility] patient discharged from? Go   Is LACE score < 7 ? No   Eligibility [facility] Go   Date of Admission 06/12/24   Date of Discharge 06/14/24   Discharge Disposition Home or Self Care   Discharge diagnosis AMS (altered mental status)   Does the patient have one of the following disease processes/diagnoses(primary or secondary)? Other   Prep survey completed? Yes            Nehal ROBERTSON - Registered Nurse

## 2024-06-14 NOTE — DISCHARGE SUMMARY
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 female with history of Hodgkin's lymphoma, cancer related pain on Dilaudid pump, supraglottic squamous cell carcinoma with involvement of the laryngeal side of the epiglottis status post external beam radiotherapy, lupus on chronic prednisone, COPD, chronic hypoxic respiratory failure 2 L,  noncompliant with BiPAP, anxiety on Xanax, neuropathy on gabapentin, CAD, HTN, type II DM, aortic stenosis s/p TAVR who was found at home morning of presentation confused and short of breath with SpO2 in the 80s.  Initial BP soft with heart rate in the 100s and SpO2 81% on room air.  pH 7.49, pCO2 35, pO2 45, SpO2 85% on 2 L.  Chest x-ray clear.  White count normal.  CT head no gross abnormality.  CT chest without contrast with findings of emphysema, small tree-in-bud opacities right middle lobe, infectious or inflammatory.  Xenical.  Gabapentin regimen decreased.  Continued on Dilaudid pump otherwise avoid narcotics.  Labs were not consistent with lupus flare or adrenal insufficiency.  Continued on  prednisone 10 mg daily.  Following day she was doing better and wanted to go home.  She was on baseline 2 L of oxygen.  Tolerating oral intake and ambulating independently.  Discussed importance of wearing oxygen at all times and particularly at night if she continues to not use NIV.  Discussed limiting sedating medications and taking prescribed medications only as instructed.  Recommended follow-up with PCP next week.  Discharged home in stable condition.    Follow-up instructions: Repeat CT of the chest in 3 months    Day of Discharge     Vital Signs:  Temp:  [97.9 °F (36.6 °C)-98.2 °F (36.8 °C)] 97.9 °F (36.6 °C)  Heart Rate:  [69-91] 91  Resp:  [18-26] 18  BP: ()/(46-98) 119/98  Flow (L/min):  [2] 2  Physical Exam:   GENERAL: The patient is conversant and nontoxic.  HEART: Regular rate and rhythm. No edema  LUNGS: Clear, nonlabored  ABDOMEN: Soft, nondistended  SKIN: No rash or wounds  NEUROLOGIC: Alert, CN grossly intact, speech clear      Discharge Details        Discharge Medications        New Medications        Instructions Start Date   azithromycin 250 MG tablet  Commonly known as: ZITHROMAX   250 mg, Oral, Daily             Changes to Medications        Instructions Start Date   furosemide 40 MG tablet  Commonly known as: Lasix  What changed: when to take this   40 mg, Oral, Daily      gabapentin 300 MG capsule  Commonly known as: NEURONTIN  What changed: when to take this   300 mg, Oral, 3 Times Daily             Continue These Medications        Instructions Start Date   albuterol sulfate  (90 Base) MCG/ACT inhaler  Commonly known as: PROVENTIL HFA;VENTOLIN HFA;PROAIR HFA   2 puffs, Inhalation, Every 4 Hours PRN      albuterol 2.5 MG/0.5ML nebulizer solution  Commonly known as: PROVENTIL   2.5 mg, Nebulization, Every 4 Hours PRN      ALPRAZolam 0.5 MG tablet  Commonly known as: Xanax   0.5 mg, Oral, 2 Times Daily PRN      bacitracin 500 UNIT/GM ointment   0.9 g, Topical, 2 Times Daily       cetirizine 10 MG tablet  Commonly known as: zyrTEC   1 tablet, Oral, Daily      clopidogrel 75 MG tablet  Commonly known as: PLAVIX   75 mg, Oral, Daily      famotidine 20 MG tablet  Commonly known as: Pepcid   20 mg, Oral, 2 Times Daily      megestrol 20 MG tablet  Commonly known as: MEGACE   1 tablet, Oral, Daily      mupirocin 2 % ointment  Commonly known as: BACTROBAN   1 Application, Topical, 3 Times Daily      nicotine polacrilex 2 MG lozenge  Commonly known as: COMMIT   2 mg, Mouth/Throat, As Needed      O2  Commonly known as: OXYGEN   Inhalation, Once      ondansetron ODT 4 MG disintegrating tablet  Commonly known as: ZOFRAN-ODT   4 mg, Translingual, Every 8 Hours PRN      pain patient supplied pump   Intrathecal, Continuous, Type of Medication: Hydromorphone 15 mg/ml and Bupivacaine 0.5 mg/ml Provider:Dr. Boudreaux Provider number: [phone] Basal Dose: Hydromorphone 7.518 mg/day Bupivacaine 0.86427 mg/day Pump capacity: 40ml ( MAX of Hydromorphone 9.456 mg/ day; Bupivacaine 0.68555 mg /day) Bolus Dose:Hydromorphone 0.500 mg, Bupivacaine 0.71282 mg Max activations: 4 per day Lockout 3 hours. 1 Bolus per every 3 hours  Next refill-  every 60 days 07/29/24  Alarm date- 08/06/24 Pump manufacture:[employer]      predniSONE 10 MG tablet  Commonly known as: DELTASONE   10 mg, Oral, Daily      Tylenol 325 MG tablet  Generic drug: acetaminophen   325 mg, Oral, Every 6 Hours PRN               Allergies   Allergen Reactions   • Amoxicillin Shortness Of Breath     Has tolerated Cefazolin, Ceftriaxone, and Cefepime -Jermaine Montez, [facility]   • Ceclor [Cefaclor] Shortness Of Breath     Has tolerated Cefazolin, Ceftriaxone, and Cefepime -Jermaine Montez, [facility]     • Penicillins Shortness Of Breath     Has tolerated Cefazolin, Ceftriaxone, and Cefepime -Jermaine Montez, [facility]   • Sulfa Antibiotics Rash   • Valium [Diazepam] Mental Status Change     DEPRESSED   • Ambien [Zolpidem] Mental Status Change   • Aspirin GI Intolerance    • Ativan [Lorazepam] Mental Status Change   • Benadryl [Diphenhydramine] Anxiety     AND MAKES HER HYPER   • Biaxin [Clarithromycin] Unknown - Low Severity   • Cephalexin Unknown - Low Severity   • Clindamycin Unknown - Low Severity   • Compazine [Prochlorperazine Edisylate] Rash   • Contrast Dye (Echo Or Unknown Ct/Mr) Other (See Comments)     Caused pain in her arm   • Doxycycline Rash   • Nsaids GI Intolerance   • Phenergan [Promethazine Hcl] GI Intolerance   • Promethazine GI Intolerance   • Vancomycin Itching       Discharge Disposition:  Home or Self Care    Diet:  Hospital:  Diet Order   Procedures   • Diet: Diabetic; Consistent Carbohydrate; No Straw; Texture: Mechanical Ground (NDD 2); Fluid Consistency: Honey Thick       Discharge Activity:   Activity Instructions       Activity as Tolerated     Additional Activity Instructions:    Advance as tolerated; currently x1 assist to pivot to bedside commode           CODE STATUS:  Code Status and Medical Interventions:   Ordered at: 06/13/24 0237     Level Of Support Discussed With:    Health Care Surrogate     Code Status (Patient has no pulse and is not breathing):    CPR (Attempt to Resuscitate)     Medical Interventions (Patient has pulse or is breathing):    Full Support         Future Appointments   Date Time Provider Department Center   6/17/2024 11:00 AM Goyo Nunez MD [facility] RO RAKEL [facility]   8/26/2024  9:30 AM Katia Wright MD [facility] PC RADCL [facility]   9/10/2024 10:00 AM Ronnie Mcgarry MD [facility] [place] ENT ETWN [facility]   10/9/2024 11:30 AM Sobeida Espinosa APRN [facility] GE ETWH RAKEL       Additional Instructions for the Follow-ups that You Need to Schedule       Discharge Follow-up with PCP   As directed       Currently Documented PCP:    Katia Wright MD    PCP Phone Number:    [phone]     Follow Up Details: 1 week                Pertinent  and/or Most Recent Results     PROCEDURES:   NONE    LAB RESULTS:      Lab 06/14/24  0515 06/13/24  0523 06/12/24  6256  06/12/24 2300   WBC 4.21  --   --  3.93   HEMOGLOBIN 11.2*  --   --  13.4   HEMATOCRIT 35.9  --   --  42.8   PLATELETS 102*  --   --  132*   NEUTROS ABS  --   --   --  2.77   IMMATURE GRANS (ABS)  --   --   --  0.02   LYMPHS ABS  --   --   --  0.66*   MONOS ABS  --   --   --  0.42   EOS ABS  --   --   --  0.05   MCV 90.9  --   --  89.9   SED RATE  --  16  --   --    CRP  --  2.28*  --   --    PROCALCITONIN  --  0.07  --   --    LACTATE 0.7  --  0.9  --          Lab 06/14/24 0515 06/13/24 0523 06/12/24 2300   SODIUM 139  --  138   POTASSIUM 3.6  --  3.8   CHLORIDE 101  --  98   CO2 31.9*  --  30.1*   ANION GAP 6.1  --  9.9   BUN 8  --  11   CREATININE 0.43*  --  0.53*   EGFR 112.9  --  107.4   GLUCOSE 94  --  119*   CALCIUM 8.8  --  9.1   MAGNESIUM  --   --  1.6   TSH  --  1.190  --          Lab 06/12/24 2300   TOTAL PROTEIN 6.4   ALBUMIN 3.8   GLOBULIN 2.6   ALT (SGPT) 13   AST (SGOT) 17   BILIRUBIN 0.5   ALK PHOS 60         Lab 06/12/24 2300   PROBNP 191.1   HSTROP T 19*                 Lab 06/12/24  2357   FIO2 32     Brief Urine Lab Results  (Last result in the past 365 days)        Color   Clarity   Blood   Leuk Est   Nitrite   Protein   CREAT   Urine HCG        06/12/24 2308 Dark Yellow   Clear   Negative   Trace   Negative   Trace                 Microbiology Results (last 10 days)       Procedure Component Value - Date/Time    Blood Culture - Blood, Hand, Right [[id number]]  (Normal) Collected: 06/13/24 0523    Lab Status: Preliminary result Specimen: Blood from Hand, Right Updated: 06/14/24 0600     Blood Culture No growth at 24 hours    COVID PRE-OP / PRE-PROCEDURE SCREENING ORDER (NO ISOLATION) - Swab, Nasopharynx [[id number]]  (Normal) Collected: 06/12/24 2307    Lab Status: Final result Specimen: Swab from Nasopharynx Updated: 06/13/24 0003    Narrative:      The following orders were created for panel order COVID PRE-OP / PRE-PROCEDURE SCREENING ORDER (NO ISOLATION) - Swab, Nasopharynx.  Procedure                                Abnormality         Status                     ---------                               -----------         ------                     COVID-19, FLU A/B, RSV P...[[id number]]  Normal              Final result                 Please view results for these tests on the individual orders.    COVID-19, FLU A/B, RSV PCR 1 HR TAT - Swab, Nasopharynx [[id number]]  (Normal) Collected: 06/12/24 2307    Lab Status: Final result Specimen: Swab from Nasopharynx Updated: 06/13/24 0003     COVID19 Not Detected     Influenza A PCR Not Detected     Influenza B PCR Not Detected     RSV, PCR Not Detected    Narrative:      Fact sheet for providers: https://www.fda.gov/media/165792/download    Fact sheet for patients: https://www.fda.gov/media/343539/download    Test performed by PCR.    Blood Culture - Blood, Arm, Right [[id number]]  (Normal) Collected: 06/12/24 2304    Lab Status: Preliminary result Specimen: Blood from Arm, Right Updated: 06/13/24 2315     Blood Culture No growth at 24 hours            CT Chest Without Contrast Diagnostic    Result Date: 6/13/2024  1. Exam is degraded by motion and streak artifact from the patient's arms. 2. Tree-in-bud nodular opacities in the right middle lobe are likely infectious or inflammatory. Attention on 3-month follow-up chest CT is recommended. 3. Pulmonary emphysema. 4. Additional nonacute findings as above Electronically Signed: Des Carlson MD  6/13/2024 1:43 AM EDT  Workstation ID: [id number]    CT Head Without Contrast    Result Date: 6/13/2024  Motion-degraded exam. No definite acute findings. Electronically Signed: Des Carlson MD  6/13/2024 1:34 AM EDT  Workstation ID: [id number]    XR Chest 1 View    Result Date: 6/12/2024  No acute cardiopulmonary findings. Electronically Signed: Des Carlson MD  6/12/2024 10:57 PM EDT  Workstation ID: [id number]              Results for orders placed during the hospital encounter of 05/26/23    Adult Transthoracic  Echo Complete w/ Color, Spectral and Contrast if necessary per protocol    Interpretation Summary  •  Left ventricular ejection fraction appears to be 46 - 50%.  •  Left ventricular wall thickness is consistent with mild concentric hypertrophy.  •  Left ventricular diastolic function was indeterminate.  •  There is a TAVR valve present.  •  Moderate to severe tricuspid valve regurgitation is present.  •  Estimated right ventricular systolic pressure from tricuspid regurgitation is mildly elevated (35-45 mmHg).    There were no apparent intracardiac masses, vegetations or thrombi.      Labs Pending at Discharge:  Pending Labs       Order Current Status    Anti-DNA Antibody, Double-stranded In process    Blood Culture - Blood, Arm, Right Preliminary result    Blood Culture - Blood, Hand, Right Preliminary result              Time spent on Discharge including face to face service:>30 minutes    Electronically signed by Geraldo Gould DO, 06/14/24, 11:32 AM EDT.

## 2024-06-14 NOTE — PLAN OF CARE
Goal Outcome Evaluation:  Plan of Care Reviewed With: patient           Outcome Evaluation: Pt. very alert and able to make needs known this shift.  Pt. non-complaint and disagrees with meds, diet, and plan or care. Refuses education and curses at staff.  B/p has been low, but with stable MAP.  Midodrine restarted per provider.

## 2024-06-17 ENCOUNTER — TRANSITIONAL CARE MANAGEMENT TELEPHONE ENCOUNTER (OUTPATIENT)
Dept: CALL CENTER | Facility: HOSPITAL | Age: 59
End: 2024-06-17
Payer: COMMERCIAL

## 2024-06-17 ENCOUNTER — TELEPHONE (OUTPATIENT)
Dept: RADIATION ONCOLOGY | Facility: HOSPITAL | Age: 59
End: 2024-06-17
Payer: COMMERCIAL

## 2024-06-17 LAB
BACTERIA SPEC AEROBE CULT: NORMAL
DSDNA AB SER-ACNC: <1 IU/ML (ref 0–9)

## 2024-06-17 RX ORDER — PANTOPRAZOLE SODIUM 20 MG/1
20 TABLET, DELAYED RELEASE ORAL DAILY
Qty: 30 TABLET | Refills: 1 | Status: SHIPPED | OUTPATIENT
Start: 2024-06-17

## 2024-06-17 NOTE — TELEPHONE ENCOUNTER
Pt is requesting pantoprazole. Order pended.   Last ov: 4/16/24 - scope  Next ov: 10/9/24  Last refill: d/c med list

## 2024-06-17 NOTE — TELEPHONE ENCOUNTER
Diagnosis: History of head and neck cancer    Reason for Referral: Transportation    Content of Visit: OSW assistance requested by Jackeline SANCHEZ in radiation oncology, advising that Ms. Llanes did not make her follow-up appointment today due to not having transportation arranged. She was recently in the hospital last week. Jackeline spoke with Ms. Llanes's daughter this morning and she advised that Ms. Llanes's phone is currently inoperable. She will make Ms. Llanes aware that her appointment has been rescheduled to 6/27/24 at 1400, however, requested our assistance in getting her transportation arranged. OSW contacted [employer] to arrange Ms. Llanes's transportation to her radiation oncology follow-up appointment - confirmation #[id number], return #[id number]. JAZMYNE advised that Ms. Llanes has previously been instructed to call Medicaid to update her address on file, as they are required to pick her up from the address listed. They will provide her with another 30 day emmanuel period to get this updated. OSW left her daughter, Sheila, a voicemail to make her aware that Ms. Llanes will need to call [phone] and request to update her address with [employer]. OSW support remains available.    Resources/Referrals Provided: [employer]

## 2024-06-17 NOTE — OUTREACH NOTE
Call Center TCM Note      Flowsheet Row Responses   [facility] patient discharged from? Go   Does the patient have one of the following disease processes/diagnoses(primary or secondary)? Other   TCM attempt successful? No   Unsuccessful attempts Attempt 2  [attempted daughter per VR]            Maria Isabel Smallwood RN    6/17/2024, 15:22 EDT

## 2024-06-17 NOTE — OUTREACH NOTE
Call Center TCM Note      Flowsheet Row Responses   [facility] patient discharged from? Go   Does the patient have one of the following disease processes/diagnoses(primary or secondary)? Other   TCM attempt successful? No   Unsuccessful attempts Attempt 1            Maria Isabel Smallwood RN    6/17/2024, 09:52 EDT

## 2024-06-18 ENCOUNTER — TRANSITIONAL CARE MANAGEMENT TELEPHONE ENCOUNTER (OUTPATIENT)
Dept: CALL CENTER | Facility: HOSPITAL | Age: 59
End: 2024-06-18
Payer: COMMERCIAL

## 2024-06-18 LAB — BACTERIA SPEC AEROBE CULT: NORMAL

## 2024-06-18 NOTE — OUTREACH NOTE
Call Center TCM Note      Flowsheet Row Responses   [facility] facility patient discharged from? Go   Does the patient have one of the following disease processes/diagnoses(primary or secondary)? Other   TCM attempt successful? No   Unsuccessful attempts Attempt 3            Erlinda Cesar LPN    6/18/2024, 12:00 EDT

## 2024-06-25 ENCOUNTER — TELEPHONE (OUTPATIENT)
Dept: RADIATION ONCOLOGY | Facility: HOSPITAL | Age: 59
End: 2024-06-25
Payer: COMMERCIAL

## 2024-06-25 ENCOUNTER — TELEPHONE (OUTPATIENT)
Dept: FAMILY MEDICINE CLINIC | Facility: CLINIC | Age: 59
End: 2024-06-25
Payer: COMMERCIAL

## 2024-06-25 NOTE — TELEPHONE ENCOUNTER
Diagnosis: History of head and neck cancer     Reason for Referral: Transportation     Content of Visit: OSW received a VM from Ms. Llanes this afternoon, calling from her step mother's phone number, [phone], requesting assistance in getting her transportation arranged for her PCP f/u on 6/27/24 at 0900 and radiation oncology on 6/27/24 at 1400. OSW has already arranged her transportation to radiation oncology that afternoon. OSW contacted [employer] to request they please make an exception to their required 72 hour notice and approve for Ms. Llanes to be transported that morning as well. OSW received a one time exception for Ms. Llanes to be transported to her PCP within the short timeframe - confirmation #[id number], return #[id number]. OSW contacted Ms. Llanes back and notified her step mother that her transportation has been arranged for both appointments Thursday. OSW support remains available.    Resources/Referrals Provided: [employer]

## 2024-06-27 ENCOUNTER — OFFICE VISIT (OUTPATIENT)
Dept: FAMILY MEDICINE CLINIC | Facility: CLINIC | Age: 59
End: 2024-06-27
Payer: COMMERCIAL

## 2024-06-27 VITALS
HEIGHT: 66 IN | WEIGHT: 103 LBS | DIASTOLIC BLOOD PRESSURE: 60 MMHG | HEART RATE: 86 BPM | BODY MASS INDEX: 16.55 KG/M2 | SYSTOLIC BLOOD PRESSURE: 100 MMHG | OXYGEN SATURATION: 96 % | TEMPERATURE: 97.1 F

## 2024-06-27 DIAGNOSIS — M32.9 LUPUS: ICD-10-CM

## 2024-06-27 DIAGNOSIS — L97.222 VENOUS STASIS ULCER OF LEFT CALF WITH FAT LAYER EXPOSED WITHOUT VARICOSE VEINS: ICD-10-CM

## 2024-06-27 DIAGNOSIS — M51.36 DDD (DEGENERATIVE DISC DISEASE), LUMBAR: ICD-10-CM

## 2024-06-27 DIAGNOSIS — R63.4 WEIGHT LOSS: ICD-10-CM

## 2024-06-27 DIAGNOSIS — N39.0 RECURRENT UTI: Primary | ICD-10-CM

## 2024-06-27 DIAGNOSIS — Z51.81 MEDICATION MONITORING ENCOUNTER: ICD-10-CM

## 2024-06-27 DIAGNOSIS — I87.2 VENOUS STASIS ULCER OF LEFT CALF WITH FAT LAYER EXPOSED WITHOUT VARICOSE VEINS: ICD-10-CM

## 2024-06-27 DIAGNOSIS — R53.1 WEAKNESS: ICD-10-CM

## 2024-06-27 DIAGNOSIS — Z09 HOSPITAL DISCHARGE FOLLOW-UP: ICD-10-CM

## 2024-06-27 DIAGNOSIS — Z74.09 LIMITED MOBILITY: ICD-10-CM

## 2024-06-27 DIAGNOSIS — R49.0 HOARSENESS OF VOICE: ICD-10-CM

## 2024-06-27 DIAGNOSIS — Z78.9 HISTORY OF HOMELESS: ICD-10-CM

## 2024-06-27 LAB
BILIRUB BLD-MCNC: ABNORMAL MG/DL
CLARITY, POC: ABNORMAL
COLOR UR: ABNORMAL
GLUCOSE UR STRIP-MCNC: NEGATIVE MG/DL
KETONES UR QL: ABNORMAL
LEUKOCYTE EST, POC: ABNORMAL
NITRITE UR-MCNC: NEGATIVE MG/ML
PH UR: 7 [PH] (ref 5–8)
PROT UR STRIP-MCNC: ABNORMAL MG/DL
RBC # UR STRIP: NEGATIVE /UL
SP GR UR: 1.03 (ref 1–1.03)
UROBILINOGEN UR QL: ABNORMAL

## 2024-06-27 PROCEDURE — 99214 OFFICE O/P EST MOD 30 MIN: CPT | Performed by: STUDENT IN AN ORGANIZED HEALTH CARE EDUCATION/TRAINING PROGRAM

## 2024-06-27 PROCEDURE — 1125F AMNT PAIN NOTED PAIN PRSNT: CPT | Performed by: STUDENT IN AN ORGANIZED HEALTH CARE EDUCATION/TRAINING PROGRAM

## 2024-06-27 PROCEDURE — 1159F MED LIST DOCD IN RCRD: CPT | Performed by: STUDENT IN AN ORGANIZED HEALTH CARE EDUCATION/TRAINING PROGRAM

## 2024-06-27 PROCEDURE — 1160F RVW MEDS BY RX/DR IN RCRD: CPT | Performed by: STUDENT IN AN ORGANIZED HEALTH CARE EDUCATION/TRAINING PROGRAM

## 2024-06-27 RX ORDER — NITROFURANTOIN 25; 75 MG/1; MG/1
100 CAPSULE ORAL 2 TIMES DAILY
Qty: 10 CAPSULE | Refills: 0 | Status: SHIPPED | OUTPATIENT
Start: 2024-06-27 | End: 2024-07-02

## 2024-06-27 NOTE — PROGRESS NOTES
"Chief Complaint  Hospital Follow Up Visit    Subjective      History of Present Illness    Isha Llanes is a 58 y.o. 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  Initial BP soft with heart rate in the 100s and SpO2 81% on room air.  pH 7.49, pCO2 35, pO2 45, SpO2 85% on 2 L.  Chest x-ray clear.  White count normal.  CT head no gross abnormality.  CT chest without contrast with findings of emphysema, small tree-in-bud opacities right middle lobe, infectious or inflammatory.   Pt was discharged home to decrease sedating meds.  We discussed discontinuing gabapentin all together she agrees.  Pt states she has Had recurrent UTI's and requesting to see Urology.     Objective   Vital Signs:   Vitals:    06/27/24 0950   BP: 100/60   Pulse: 86   Temp: 97.1 °F (36.2 °C)   SpO2: 96%   Weight: 46.7 kg (103 lb)   Height: 167.6 cm (66\")     Body mass index is 16.62 kg/m².    Wt Readings from Last 3 Encounters:   06/27/24 46.7 kg (103 lb)   06/13/24 58.6 kg (129 lb 3 oz)   06/06/24 48 kg (105 lb 14.4 oz)     BP Readings from Last 3 Encounters:   06/27/24 100/60   06/14/24 119/98   06/06/24 138/82   CT Chest Without Contrast Diagnostic    Result Date: 6/13/2024  1. Exam is degraded by motion and streak artifact from the patient's arms. 2. Tree-in-bud nodular opacities in the right middle lobe are likely infectious or inflammatory. Attention on 3-month follow-up chest CT is " recommended. 3. Pulmonary emphysema. 4. Additional nonacute findings as above Electronically Signed: Des Carlson MD  6/13/2024 1:43 AM EDT  Workstation ID: [id number]    CT Head Without Contrast    Result Date: 6/13/2024  Motion-degraded exam. No definite acute findings. Electronically Signed: Des Carlson MD  6/13/2024 1:34 AM EDT  Workstation ID: [id number]    XR Chest 1 View    Result Date: 6/12/2024  No acute cardiopulmonary findings. Electronically Signed: Des Carlson MD  6/12/2024 10:57 PM EDT  Workstation ID: [id number]    NM PET/CT Skull Base to Mid Thigh    Result Date: 5/7/2024   1. Relatively low-grade increased activity in the left supraglottic region which may be on the basis of posttreatment changes as described. 2. No definite evidence of metastatic disease. 3. Incidental findings as noted above.    Electronically Signed By-Jose G Hidalgo MD On:5/7/2024 11:49 AM      XR Chest 1 View    Result Date: 4/27/2024  No acute cardiopulmonary process identified.   Electronically Signed By-Ryan Farnsworth MD On:4/27/2024 11:32 AM      CT Abdomen Pelvis Without Contrast    Result Date: 3/7/2024    1. Ground-glass opacity left lung base which may represent underlying viral or atypical pneumonia.  Please correlate with Covid 19 status 2. No definite acute abnormality noted. 3. Additional findings as above     RAHUL MONTERO MD       Electronically Signed and Approved By: RAHUL MONTERO MD on 3/07/2024 at 16:16               Health Maintenance   Topic Date Due    COVID-19 Vaccine (1) Never done    DIABETIC EYE EXAM  Never done    ZOSTER VACCINE (1 of 2) Never done    PAP SMEAR  03/29/2017    LIPID PANEL  01/12/2024    HEMOGLOBIN A1C  05/29/2024    DIABETIC FOOT EXAM  08/29/2024 (Originally 3/29/2017)    Hepatitis B (1 of 3 - 19+ 3-dose series) 02/19/2025 (Originally 12/4/1984)    Pneumococcal Vaccine 0-64 (1 of 2 - PCV) 02/19/2025 (Originally 12/4/1971)    MAMMOGRAM  02/19/2025 (Originally 1/8/2021)     TDAP/TD VACCINES (1 - Tdap) 02/19/2025 (Originally 12/4/1984)    INFLUENZA VACCINE  08/01/2024    URINE MICROALBUMIN  02/19/2025    BMI FOLLOWUP  05/06/2025    ANNUAL PHYSICAL  05/23/2025    LUNG CANCER SCREENING  06/13/2025    COLORECTAL CANCER SCREENING  04/13/2032    HEPATITIS C SCREENING  Completed       Physical Exam  Constitutional:       Appearance: She is ill-appearing.      Comments: In a wheel chair, cachectic appearing.    HENT:      Head: Normocephalic.      Nose: Nose normal.      Mouth/Throat:      Mouth: Mucous membranes are moist.   Eyes:      Pupils: Pupils are equal, round, and reactive to light.   Cardiovascular:      Rate and Rhythm: Normal rate and regular rhythm.   Pulmonary:      Effort: Pulmonary effort is normal.   Abdominal:      General: Abdomen is flat.   Musculoskeletal:         General: Normal range of motion.      Cervical back: Normal range of motion.   Skin:     General: Skin is warm and dry.   Neurological:      General: No focal deficit present.      Mental Status: She is alert.   Psychiatric:         Mood and Affect: Mood normal.         Thought Content: Thought content normal.          Result Review :     The following data was reviewed by: Katia Wright MD on 06/27/2024:      Procedures          Diagnoses and all orders for this visit:    1. Recurrent UTI (Primary)  -     nitrofurantoin, macrocrystal-monohydrate, (Macrobid) 100 MG capsule; Take 1 capsule by mouth 2 (Two) Times a Day for 5 days.  Dispense: 10 capsule; Refill: 0  -     POCT urinalysis dipstick, manual    2. Hospital discharge follow-up  -     Ambulatory Referral to Home Health    3. Medication monitoring encounter    4. Lupus    5. Venous stasis ulcer of left calf with fat layer exposed without varicose veins    6. Hoarseness of voice  -     Ambulatory Referral to Speech Therapy    7. History of homeless    8. Weight loss  -     DME ORAL SUPPLEMENTS    9. Weakness  -     Ambulatory Referral to Home Health    10.  Limited mobility  -     Ambulatory Referral to Home Health    11. DDD (degenerative disc disease), lumbar  -     Ambulatory Referral to Home Health     Stop gabapentin TODAY.   We also discussed decreasing her Xanax patient refuses to discontinue it or decrease it however I will refuse to refill it next month I am going to refill her at 0.25 mg twice daily and eventually 0.25 once daily to eventually stop this medication.        33 minutes were spent caring for Isha on this date of service. This time spent by me includes preparing for the visit, reviewing tests, obtaining/reviewing separately obtained history, performing medically appropriate exam/evaluation, counseling/educating the patient/family/caregiver, ordering medications/tests/procedures, referring/communicating with other health care professionals, documenting information in the medical record, independently interpreting results and communicating that with the patient/family/caregiver and/or care coordination.          FOLLOW UP  No follow-ups on file.  Patient was given instructions and counseling regarding her condition or for health maintenance advice. Please see specific information pulled into the AVS if appropriate.       Katia Wright MD  06/27/24  13:08 EDT    CURRENT & DISCONTINUED MEDICATIONS  Current Outpatient Medications   Medication Instructions    acetaminophen (TYLENOL) 325 mg, Oral, Every 6 Hours PRN    albuterol (PROVENTIL) 2.5 mg, Nebulization, Every 4 Hours PRN    albuterol sulfate  (90 Base) MCG/ACT inhaler 2 puffs, Inhalation, Every 4 Hours PRN    ALPRAZolam (XANAX) 0.5 mg, Oral, 2 Times Daily PRN    azithromycin (ZITHROMAX) 250 mg, Oral, Daily    bacitracin 0.9 g, Topical, 2 Times Daily    cetirizine (zyrTEC) 10 MG tablet 1 tablet, Oral, Daily    clopidogrel (PLAVIX) 75 mg, Oral, Daily    famotidine (PEPCID) 20 mg, Oral, 2 Times Daily    furosemide (LASIX) 40 mg, Oral, Daily    megestrol (MEGACE) 20 MG tablet 1 tablet,  Oral, Daily    mupirocin (BACTROBAN) 2 % ointment 1 Application, Topical, 3 Times Daily    nicotine polacrilex (COMMIT) 2 mg, Mouth/Throat, As Needed    nitrofurantoin (macrocrystal-monohydrate) (MACROBID) 100 mg, Oral, 2 Times Daily    O2 (OXYGEN) Inhalation, Once    ondansetron ODT (ZOFRAN-ODT) 4 mg, Translingual, Every 8 Hours PRN    pain patient supplied pump Intrathecal, Continuous, Type of Medication: Hydromorphone 15 mg/ml and Bupivacaine 0.5 mg/ml<BR>Provider:Dr. Boudreaux<BR>Provider number: [phone]<BR>Basal Dose: Hydromorphone 7.518 mg/day Bupivacaine 0.29796 mg/day<BR>Pump capacity: 40ml<BR>( MAX of Hydromorphone 9.456 mg/ day; Bupivacaine 0.58806 mg /day)<BR>Bolus Dose:Hydromorphone 0.500 mg, Bupivacaine 0.53932 mg<BR>Max activations: 4 per day<BR>Lockout 3 hours. 1 Bolus per every 3 hours <BR>Next refill-  every 60 days 07/29/24 <BR>Alarm date- 08/06/24<BR>Pump manufacture:[employer]    pantoprazole (PROTONIX) 20 mg, Oral, Daily    predniSONE (DELTASONE) 10 mg, Oral, Daily       Medications Discontinued During This Encounter   Medication Reason    gabapentin (NEURONTIN) 300 MG capsule

## 2024-06-28 ENCOUNTER — TELEPHONE (OUTPATIENT)
Dept: RADIATION ONCOLOGY | Facility: HOSPITAL | Age: 59
End: 2024-06-28
Payer: COMMERCIAL

## 2024-06-28 NOTE — TELEPHONE ENCOUNTER
"Diagnosis: History of head and neck cancer    Reason for Referral: Patient navigation    Content of Visit: OSW received a phone call from Ms. Llanes this morning reporting she was speaking with the radiation oncology department in regards to getting her follow-up appointment rescheduled, as this was missed yesterday.  OSW helped facilitate in getting her rescheduled to be seen by the radiation oncologist on Tuesday, 7/2/2024 at 1 PM.  OSW contacted [employer] transportation to arrange Ms. Llanes's transportation to this visit - confirmation number: [id number], return number: [id number].  Ms. Llanes reports she is in need of getting \" thickening stuff\" to add to her nutrition and she is unable to afford this.  She also reports ongoing barriers with obtaining Ensure, which is being ordered by her primary care provider.  She inquired if there is a formula that already comes thickened that we may order.  Additionally, she also inquired if there is a special machine that she can receive that helps grind food.  She reports she has a , but does not believe it is working correctly.  OSW relayed these nutritional needs to the oncology dietitian, Cynthia RUIZ to help facilitate.  Advised Ms. Llanes that depending on the dietitian's recommendations and what the insurance does or does not cover, OSW may be able to identify resource(s) to help with unpaid expenses.  Will continue to follow to provide ongoing support.  OSW support remains available.    Resources/Referrals Provided: Patient navigation, [employer], Oncology dietitian  "

## 2024-07-01 ENCOUNTER — TELEPHONE (OUTPATIENT)
Dept: NUTRITION | Facility: HOSPITAL | Age: 59
End: 2024-07-01
Payer: COMMERCIAL

## 2024-07-01 NOTE — PROGRESS NOTES
Outpatient Nutrition Oncology Assessment    Patient Name: Isha Llanes  YOB: 1965  MRN: [id number]  Assessment Date: 7/1/2024    CLINICAL NUTRITION ASSESSMENT    Dx:  supraglottic Ca      Reason for Assessment   Referral per OSW       Current Problems:   Patient Active Problem List   Diagnosis Code    Septic shock A41.9, R65.21    Acute MI I21.9    Cancer associated pain G89.3    Presence of intrathecal pump Z97.8    Chronic low back pain with bilateral sciatica M54.41, M54.42, G89.29    Sacroiliac inflammation M46.1    Chronic pain syndrome G89.4    Pelvic pain R10.2    Aortic stenosis, severe I35.0    Dyspnea on effort R06.09    Other pulmonary embolism without acute cor pulmonale I26.99    Nonrheumatic aortic valve stenosis I35.0    Hip pain M25.559    Anxiety disorder F41.9    Allergic rhinitis due to allergen J30.9    Arthritis M19.90    Asthma J45.909    Kidney disease N28.9    CHF (congestive heart failure) I50.9    Chronic obstructive pulmonary disease J44.9    Diabetes mellitus, type 2 E11.9    Depression F32.A    GERD (gastroesophageal reflux disease) K21.9    S/P TAVR (transcatheter aortic valve replacement) Z95.2    Limited mobility Z74.09    Venous hypertension of both lower extremities I87.303    Nicotine dependence F17.200    Mitral valve prolapse I34.1    Lupus (systemic lupus erythematosus) M32.9    Long term current use of anticoagulant therapy Z79.01    History of Hodgkin's lymphoma Z85.71    Hepatic steatosis K76.0    Heart murmur R01.1    CAD (coronary artery disease) I25.10    Non-healing wound of lower extremity, subsequent encounter S81.809D    MSSA (methicillin susceptible Staphylococcus aureus) infection A49.01    Sepsis A41.9    Change in bowel habits R19.4    Nausea R11.0    Venous stasis ulcer of right calf I83.012, L97.219    Venous stasis ulcer of left calf I83.022, L97.229    Noncompliance Z91.199    Peripheral vascular disease I73.9    Obesity with body mass  index 30 or greater E66.9    Neurologic disorder G98.8    Neck pain M54.2    Limb pain M79.609    Hiatal hernia K44.9    Bladder disorder N32.9    Frailty R54    Cellulitis of right foot L03.115    Bowel disease K63.9    Atrophy of skin L90.9    Breast lump N63.0    Encounter for care related to vascular access port Z45.2    Primary osteoarthritis of right hip M16.11    Tobacco abuse Z72.0    Laryngeal cancer C32.9    Throat pain R07.0    Voice hoarseness R49.0    Hodgkin lymphoma C81.90    Malignant neoplasm of supraglottis C32.1    Dysphagia R13.10    Nausea vomiting and diarrhea R11.2, R19.7    Weight loss, abnormal R63.4    Epigastric pain R10.13    Nausea and vomiting R11.2    History of laryngeal cancer Z85.21    Anorexia R63.0    AMS (altered mental status) R41.82    Acute-on-chronic respiratory failure J96.20         Anthropometrics       Row Name 07/01/24 1535          Anthropometrics    Weight for Calculation 46.7 kg (102 lb 15.3 oz)                         Weight Hx  Wt Readings from Last 30 Encounters:   06/27/24 0950 46.7 kg (103 lb)   06/13/24 0417 58.6 kg (129 lb 3 oz)   06/12/24 2236 59.3 kg (130 lb 11.7 oz)   06/06/24 1049 48 kg (105 lb 14.4 oz)   05/29/24 2259 62.2 kg (137 lb 2 oz)   05/23/24 1105 47.6 kg (105 lb)   05/09/24 1042 52.2 kg (115 lb)   05/06/24 1038 58 kg (127 lb 14.4 oz)   04/27/24 1041 64.9 kg (143 lb 1.3 oz)   04/10/24 1200 45.8 kg (101 lb)   04/08/24 1127 45.8 kg (101 lb)   03/19/24 1403 57.2 kg (126 lb)   03/07/24 2200 57.2 kg (126 lb 1.7 oz)   03/07/24 2148 57.2 kg (126 lb 1.7 oz)   03/07/24 1311 59.8 kg (131 lb 12.8 oz)   03/05/24 1112 60.3 kg (133 lb)   02/19/24 0906 60.4 kg (133 lb 2.5 oz)   02/05/24 0657 60.4 kg (133 lb 2.5 oz)   02/05/24 0138 59 kg (130 lb)   01/09/24 1034 59 kg (130 lb)   12/19/23 1322 59 kg (130 lb 1.1 oz)   12/13/23 1319 56.7 kg (125 lb)   11/16/23 0933 56.7 kg (125 lb)   10/27/23 1503 56.9 kg (125 lb 7.1 oz)   10/06/23 1440 58 kg (127 lb 13.9 oz)    09/29/23 1055 57.2 kg (126 lb 1.7 oz)   09/29/23 1016 57.2 kg (126 lb)   09/06/23 0900 57.7 kg (127 lb 4.3 oz)   09/05/23 1014 58.4 kg (128 lb 12 oz)   08/30/23 0900 58.2 kg (128 lb 4.9 oz)   08/29/23 0952 59 kg (130 lb 1.1 oz)   08/24/23 0900 60.5 kg (133 lb 6.1 oz)   08/22/23 1001 61 kg (134 lb 7.7 oz)   08/18/23 1515 60 kg (132 lb 4.4 oz)   08/15/23 1003 61.2 kg (134 lb 14.7 oz)         Estimated/Assessed Needs - Anthropometrics       Row Name 07/01/24 1535          Anthropometrics    Weight for Calculation 46.7 kg (102 lb 15.3 oz)        Estimated/Assessed Needs    Additional Documentation Fluid Requirements (Group);Protein Requirements (Group);KCAL/KG (Group)        KCAL/KG    KCAL/KG 35 Kcal/Kg (kcal);40 Kcal/Kg (kcal)     35 Kcal/Kg (kcal) 1634.5     40 Kcal/Kg (kcal) 1868        Protein Requirements    Weight Used For Protein Calculations 46.7 kg (102 lb 15.3 oz)     Est Protein Requirement Amount (gms/kg) 1.5 gm protein     Estimated Protein Requirements (gms/day) 70.05        Fluid Requirements    Fluid Requirements (mL/day) 1635     RDA Method (mL) 1635                      Labs/Medications        Pertinent Labs Reviewed.       Pertinent Medications ALPRAZolam, O2, acetaminophen, albuterol, albuterol sulfate HFA, azithromycin, bacitracin, cetirizine, clopidogrel, famotidine, furosemide, megestrol, mupirocin, nicotine polacrilex, nitrofurantoin (macrocrystal-monohydrate), ondansetron ODT, pain, pantoprazole, and predniSONE     Physical Findings        Malnutrition Severity Assessment       Row Name 07/01/24 1534          Malnutrition Severity Assessment    Malnutrition Type --        Unintentional Weight Loss     Unintentional Weight Loss Findings Severe  20% wt decline in <1 month     Unintentional Weight Loss  Weight loss greater than 5% in one month                 Nutrition Diagnosis        Nutrition Dx Problem 1 Increased nutrient needs related to increased nutrient needs due to catabolic disease as  evidenced by physiological causes increasing nutrient needs.       Nutrition Intervention       RD Action Nutrition assessment by review of medical record + brief discussion with pt's step-mother (discussed):  Recent nutrition-related concerns  Weight changes  Current PO intake (food, ONS)    See below     Monitor/Evaluation       Monitor Per oncology nutrition protocol.     Comments:    Nutrition referral due to pt / OSW request.  Pt had multiple questions regarding nutrition, including oral nutrition shake coverage through her insurance.  Pt also requested samples of Thick-It powder.      Upon calling our only vendor of oral nutrition ([facility]) there policy has changed and now only serving patients that rely on 100% of their nutrition through oral shakes.      Currently looking into options for assisting pt with oral nutrition shakes.  Also working on getting samples of thickener for her and answering all of her questions.    Plan to see patient in radiation therapy tomorrow 7/2/24 (appointment scheduled for Dr. Nunez).       Electronically signed by:  Cynthia Fonseca RD  07/01/24 15:37 EDT

## 2024-07-02 ENCOUNTER — OFFICE VISIT (OUTPATIENT)
Dept: RADIATION ONCOLOGY | Facility: HOSPITAL | Age: 59
End: 2024-07-02
Payer: COMMERCIAL

## 2024-07-02 VITALS
OXYGEN SATURATION: 99 % | HEART RATE: 86 BPM | DIASTOLIC BLOOD PRESSURE: 72 MMHG | SYSTOLIC BLOOD PRESSURE: 93 MMHG | RESPIRATION RATE: 20 BRPM | BODY MASS INDEX: 21.53 KG/M2 | WEIGHT: 133.38 LBS | TEMPERATURE: 98 F

## 2024-07-02 DIAGNOSIS — C32.1 MALIGNANT NEOPLASM OF SUPRAGLOTTIS: Primary | ICD-10-CM

## 2024-07-02 PROCEDURE — G0463 HOSPITAL OUTPT CLINIC VISIT: HCPCS | Performed by: RADIOLOGY

## 2024-07-02 NOTE — PROGRESS NOTES
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  She denies any changes in breathing.     Review of Systems: Review of Systems   Constitutional:  Positive for appetite change (Decreased, has an altered taste.) and fatigue (7/10). Negative for unexpected weight change.   HENT:  Positive for tinnitus (Occasional, ongoing), trouble swallowing (Occasional, with certain foods, dry foods.) and voice change (hoarseness, feels its worsened). Negative for congestion, rhinorrhea, sinus pressure, sinus pain and sore throat.         States feels like her ears are dry and itchy.  C/o thick secretions/saliva  Altered taste/smell  States feels like her throat and neck are swollen.     Eyes:  Positive for visual disturbance (Occasional blurred and double vision, ongoing).   Respiratory:  Positive for cough (occasional), choking (Noted with dry foods) and shortness of breath (occasional, ongoing). Negative for chest tightness.         States that sometimes she has difficulty catching her breath, states feels like it goes along with her throat.     Cardiovascular:  Negative for chest pain, palpitations and leg swelling (bilateral, states from venous stasis).   Gastrointestinal:  Positive for constipation (Educated on stool softeners/fiber.  Last bm yesterday.), diarrhea (Occasional, takes imodium prn) and nausea (Frequent). Negative for abdominal pain, anal bleeding, blood in stool and vomiting.        States that she feels like she has fluid in her abdomen.  States she feels like her abdomen will gurgle from where the tube was removed.     Genitourinary:  Positive for difficulty  urinating (occasional hesitancy). Negative for dysuria, frequency, hematuria and urgency.   Musculoskeletal:  Positive for arthralgias (Right hip and generalized pain), back pain, gait problem (States she has to bear crawl.) and neck pain (right side). Negative for neck stiffness.   Skin:  Positive for wound (venous stasis on bilateral legs). Negative for color change and rash.        Wound (diabetic Ulcer BLE)   Neurological:  Positive for dizziness (Occasional, new), weakness (Generalized, does not have strength) and headaches (occasional). Negative for light-headedness.             Psychiatric/Behavioral:  Positive for sleep disturbance (Due to pain).        The following portions of the patient's history were reviewed and updated as appropriate: allergies, current medications, past family history, past medical history, past social history, past surgical history and problem list.    Medications:     Current Outpatient Medications:     acetaminophen (Tylenol) 325 MG tablet, Take 1 tablet by mouth Every 6 (Six) Hours As Needed for Mild Pain, Headache or Fever., Disp: , Rfl:     mupirocin (BACTROBAN) 2 % ointment, Apply 1 Application topically to the appropriate area as directed 3 (Three) Times a Day., Disp: 30 g, Rfl: 2    O2 (OXYGEN), Inhale 1 (One) Time., Disp: , Rfl:     pain patient supplied pump, by Intrathecal route Continuous. Type of Medication: Hydromorphone 15 mg/ml and Bupivacaine 0.5 mg/ml Provider:Dr. Boudreaux Provider number: [phone] Basal Dose: Hydromorphone 7.518 mg/day Bupivacaine 0.54445 mg/day Pump capacity: 40ml ( MAX of Hydromorphone 9.456 mg/ day; Bupivacaine 0.34758 mg /day) Bolus Dose:Hydromorphone 0.500 mg, Bupivacaine 0.92332 mg Max activations: 4 per day Lockout 3 hours. 1 Bolus per every 3 hours  Next refill-  every 60 days 09/20/24  Alarm date- 09/27/24 Pump manufacture:FRUCT, Disp: , Rfl:     albuterol (PROVENTIL) 2.5 MG/0.5ML nebulizer solution, Take 2.5 mg by nebulization Every  4 (Four) Hours As Needed for Wheezing or Shortness of Air., Disp: 20 mL, Rfl: 0    albuterol sulfate  (90 Base) MCG/ACT inhaler, Inhale 2 puffs Every 4 (Four) Hours As Needed for Wheezing., Disp: 18 g, Rfl: 2    ALPRAZolam (Xanax) 0.5 MG tablet, Take 1 tablet by mouth At Night As Needed for Anxiety for up to 16 days., Disp: 16 tablet, Rfl: 0    ciprofloxacin (CIPRO) 500 MG tablet, Take 1 tablet by mouth Every 12 (Twelve) Hours., Disp: , Rfl:     clopidogrel (PLAVIX) 75 MG tablet, Take 1 tablet by mouth Daily., Disp: 90 tablet, Rfl: 2    furosemide (Lasix) 40 MG tablet, Take 1 tablet by mouth Daily., Disp: , Rfl:     nicotine (NICODERM CQ) 21 MG/24HR patch, Place 1 patch on the skin as directed by provider Daily., Disp: , Rfl:     ondansetron ODT (ZOFRAN-ODT) 4 MG disintegrating tablet, Place 1 tablet on the tongue Every 8 (Eight) Hours As Needed for Nausea or Vomiting., Disp: 14 tablet, Rfl: 0    predniSONE (DELTASONE) 10 MG tablet, Take 1 tablet by mouth Daily for 90 days., Disp: 30 tablet, Rfl: 2    Allergies:   Allergies   Allergen Reactions    Amoxicillin Shortness Of Breath     Has tolerated Cefazolin, Ceftriaxone, and Cefepime -Jermaine Melida, [facility]    Ceclor [Cefaclor] Shortness Of Breath     Has tolerated Cefazolin, Ceftriaxone, and Cefepime -Jermaine Purdyeman, [facility]      Penicillins Shortness Of Breath     Has tolerated Cefazolin, Ceftriaxone, and Cefepime -Jermaine Purdyeman, [facility]    Sulfa Antibiotics Rash    Valium [Diazepam] Mental Status Change     DEPRESSED    Ambien [Zolpidem] Mental Status Change    Aspirin GI Intolerance    Ativan [Lorazepam] Mental Status Change    Benadryl [Diphenhydramine] Anxiety     AND MAKES HER HYPER    Biaxin [Clarithromycin] Unknown - Low Severity    Cephalexin Unknown - Low Severity    Clindamycin Unknown - Low Severity    Compazine [Prochlorperazine Edisylate] Rash    Contrast Dye (Echo Or Unknown Ct/Mr) Other (See Comments)     Caused pain in her arm    Doxycycline Rash     Nsaids GI Intolerance    Phenergan [Promethazine Hcl] GI Intolerance    Promethazine GI Intolerance    Vancomycin Itching       ECOG: (2) Ambulatory and capable of self care, unable to carry out work activity, up and about > 50% or waking hours  Quality of Life: 40 - Must Use Wheelchair Most of Time     Objective     Physical Exam:   Vital Signs:   Vitals:    07/02/24 1340   BP: 93/72   Pulse: 86   Resp: 20   Temp: 98 °F (36.7 °C)   TempSrc: Temporal   SpO2: 99%   Weight: 60.5 kg (133 lb 6.1 oz)   PainSc:   7   PainLoc: Neck  Comment: right side     Body mass index is 21.53 kg/m².     Physical Exam  Constitutional:       General: She is not in acute distress.     Appearance: Normal appearance. She is not toxic-appearing.   HENT:      Head: Normocephalic and atraumatic.   Pulmonary:      Effort: Pulmonary effort is normal. No respiratory distress.   Neurological:      General: No focal deficit present.      Mental Status: She is alert and oriented to person, place, and time.      Cranial Nerves: No cranial nerve deficit.   Psychiatric:         Mood and Affect: Mood normal.         Behavior: Behavior normal.         Judgment: Judgment normal.       Nasopharyngoscopy Note    07/02/2024    Provider, No Known     Cancer Staging   No matching staging information was found for the patient.      Chief Complaint   Patient presents with    Follow-up       [Associated problem not found]    Rationale/Reason for Procedure (40769): History of larynx cancer    Procedure: After verbal consent was obtained, the flexible laryngoscope was passed through the right naris. The right nasal cavity and nasopharynx were within normal limits. The right torus tubarius was visualized without any lesions. The scope was passed into the oropharynx and hypopharynx where no apparent masses were observed.  The epiglottis and arytenoids were grossly edematous and visualization of the true vocal cords was difficult.  The anterior true vocal cords  were visualized and were without abnormality.  The pyriform sinuses were without disease. Both arytenoids were freely mobile as were both true vocal cords. A view of the subglottis and tracheal rings was apparent with no abnormalities was observed.  The scope was then removed.  Ms. Llanes tolerated the procedure well.         Goyo Nunez MD / 07/02/2024 / 17:29 EDT  Radiation Oncologist Signature / Date / Time             Radiographs: XR Chest 1 View    Result Date: 7/30/2024  No acute infiltrate is appreciated.    Please note that portions of this note were completed with a voice recognition program.   Electronically Signed By-Sony Hood MD On:7/30/2024 4:04 AM      CT Chest Without Contrast Diagnostic    Result Date: 7/20/2024  Impression: 1. Patchy areas of groundglass opacity in the lung fields likely infectious or inflammatory process. Recommend a follow-up examination after treatment to confirm resolution. 2. Emphysema. Electronically Signed: José Cloud MD  7/20/2024 10:56 AM EDT  Workstation ID: [id number]    CT Soft Tissue Neck Without Contrast    Result Date: 7/20/2024  Impression: Examination is limited due to lack of IV contrast administration. Nonspecific prominence of the vocal folds and aryepiglottic folds, possibly related to post therapeutic change or underlying mass. Please correlate clinically to exclude laryngitis/laryngeal edema. Direct visualization may be beneficial. Further evaluation with contrast-enhanced CT may also be considered. Sclerosis within the C5-C7 vertebral bodies is similar since 10/25/2023. Electronically Signed: Jevon Baez MD  7/20/2024 10:40 AM EDT  Workstation ID: [id number]    XR Chest 1 View    Result Date: 7/20/2024  No active disease. Electronically Signed: José Cloud MD  7/20/2024 8:12 AM EDT  Workstation ID: [id number]    CT Chest Without Contrast Diagnostic    Result Date: 6/13/2024  1. Exam is degraded by motion and streak artifact from the patient's  arms. 2. Tree-in-bud nodular opacities in the right middle lobe are likely infectious or inflammatory. Attention on 3-month follow-up chest CT is recommended. 3. Pulmonary emphysema. 4. Additional nonacute findings as above Electronically Signed: Des Carlson MD  6/13/2024 1:43 AM EDT  Workstation ID: [id number]    CT Head Without Contrast    Result Date: 6/13/2024  Motion-degraded exam. No definite acute findings. Electronically Signed: Des Carlson MD  6/13/2024 1:34 AM EDT  Workstation ID: [id number]    XR Chest 1 View    Result Date: 6/12/2024  No acute cardiopulmonary findings. 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  She has involvement of the laryngeal side of the epiglottis.  She is status post external beam radiotherapy, having received 70 Pollack in 35 daily fractions over the course of 66 elapsed days.  She has persistent laryngeal edema without airway compromise.  ECOG 3    I recommended repeat evaluation by speech and swallow therapy.  I additionally recommended repeat evaluation by Dr. Mcgarry.  She will return for repeat evaluation in 2 months.    Goyo Nunez MD  Radiation Oncology  [facility]    This document has been signed by Goyo Nunez MD on August 9, 2024 17:29 EDT

## 2024-07-03 ENCOUNTER — DOCUMENTATION (OUTPATIENT)
Dept: NUTRITION | Facility: HOSPITAL | Age: 59
End: 2024-07-03
Payer: COMMERCIAL

## 2024-07-03 ENCOUNTER — DOCUMENTATION (OUTPATIENT)
Dept: ONCOLOGY | Facility: HOSPITAL | Age: 59
End: 2024-07-03
Payer: COMMERCIAL

## 2024-07-03 DIAGNOSIS — I50.9 CONGESTIVE HEART FAILURE, UNSPECIFIED HF CHRONICITY, UNSPECIFIED HEART FAILURE TYPE: ICD-10-CM

## 2024-07-03 DIAGNOSIS — F41.9 ANXIETY: ICD-10-CM

## 2024-07-03 RX ORDER — FUROSEMIDE 40 MG/1
40 TABLET ORAL DAILY
Start: 2024-07-03

## 2024-07-03 RX ORDER — ALPRAZOLAM 0.5 MG/1
0.5 TABLET ORAL NIGHTLY PRN
Qty: 30 TABLET | Refills: 0 | Status: SHIPPED | OUTPATIENT
Start: 2024-07-03 | End: 2024-08-02

## 2024-07-03 NOTE — TELEPHONE ENCOUNTER
Caller: Isha Llanes    Relationship: Self    Best call back number: [phone]    Requested Prescriptions:   Requested Prescriptions     Pending Prescriptions Disp Refills    ALPRAZolam (Xanax) 0.5 MG tablet 60 tablet 0     Sig: Take 1 tablet by mouth 2 (Two) Times a Day As Needed for Anxiety for up to 60 days.    furosemide (Lasix) 40 MG tablet       Sig: Take 1 tablet by mouth Daily.        Pharmacy where request should be sent: [facility], [address] - [phone] [facility] [phone]      Last office visit with prescribing clinician: 6/27/2024   Last telemedicine visit with prescribing clinician: 3/5/2024   Next office visit with prescribing clinician: 8/26/2024     Additional details provided by patient: PATIENT IS OUT OF MEDICATION. PLEASE SEND NEW PRESCRIPTION TO PHARMACY LISTED ASAP.    Does the patient have less than a 3 day supply:  [x] Yes  [] No    Would you like a call back once the refill request has been completed: [] Yes [] No    If the office needs to give you a call back, can they leave a voicemail: [] Yes [] No    Maximilian Jiménez Rep   07/03/24 11:48 EDT

## 2024-07-03 NOTE — PROGRESS NOTES
Outpatient Nutrition Oncology Assessment    Patient Name: Isha Llanes  YOB: 1965  MRN: [id number]  Assessment Date: 7/3/2024    CLINICAL NUTRITION ASSESSMENT    Dx:  malignant neoplasm of supraglottis        Reason for Assessment   Nutrition referral per OSW   H&P:    Past Medical History:   Diagnosis Date    Anesthesia     REPORTS HAS GOT REAL EMOTIONAL AND HAS BECOME ANGRY BEFORE    Anxiety     Aortic stenosis, severe     HAS BIO VALVE REPLACED    Arthritis     Asthma     Back pain     Blood clotting disorder     Cancer     Cancer associated pain     HODGKINS LYMPHOMA    CHF (congestive heart failure)     Chronic low back pain with sciatica     Chronic pain disorder     COPD (chronic obstructive pulmonary disease)     Coronary artery disease     Diabetes mellitus     TYPE 2    Diabetes mellitus     Dyspnea on effort     GERD (gastroesophageal reflux disease)     H/O lumpectomy     H/O: hysterectomy     Hiatal hernia     History of degenerative disc disease     History of kidney stones     History of pulmonary embolus (PE)     History of transfusion     AS A CHILD NO TRANSFUSION REACTION    History of transfusion     Hodgkin's lymphoma     5/19/23  5 YEARS SINCE LAST CHEMO TX    Hypertension     Immobility     Liver disease     DENIES ANY CURRENT ISSUES    Lupus     Mitral valve prolapse     Nausea vomiting and diarrhea 3/7/2024    Panic disorder     Pelvic pain     Peripheral neuropathy     Personal history of DVT (deep vein thrombosis)     Presence of intrathecal pump     PTSD (post-traumatic stress disorder)     Sacroiliac joint disease     SI (sacroiliac) joint inflammation     Sleep apnea     Venous insufficiency              Anthropometrics       Row Name 07/03/24 0757          Anthropometrics    Weight for Calculation 60.5 kg (133 lb 6.1 oz)                         Weight Hx  Wt Readings from Last 30 Encounters:   07/02/24 1340 60.5 kg (133 lb 6.1 oz)   06/27/24 0950 46.7 kg (103 lb)    06/13/24 0417 58.6 kg (129 lb 3 oz)   06/12/24 2236 59.3 kg (130 lb 11.7 oz)   06/06/24 1049 48 kg (105 lb 14.4 oz)   05/29/24 2259 62.2 kg (137 lb 2 oz)   05/23/24 1105 47.6 kg (105 lb)   05/09/24 1042 52.2 kg (115 lb)   05/06/24 1038 58 kg (127 lb 14.4 oz)   04/27/24 1041 64.9 kg (143 lb 1.3 oz)   04/10/24 1200 45.8 kg (101 lb)   04/08/24 1127 45.8 kg (101 lb)   03/19/24 1403 57.2 kg (126 lb)   03/07/24 2200 57.2 kg (126 lb 1.7 oz)   03/07/24 2148 57.2 kg (126 lb 1.7 oz)   03/07/24 1311 59.8 kg (131 lb 12.8 oz)   03/05/24 1112 60.3 kg (133 lb)   02/19/24 0906 60.4 kg (133 lb 2.5 oz)   02/05/24 0657 60.4 kg (133 lb 2.5 oz)   02/05/24 0138 59 kg (130 lb)   01/09/24 1034 59 kg (130 lb)   12/19/23 1322 59 kg (130 lb 1.1 oz)   12/13/23 1319 56.7 kg (125 lb)   11/16/23 0933 56.7 kg (125 lb)   10/27/23 1503 56.9 kg (125 lb 7.1 oz)   10/06/23 1440 58 kg (127 lb 13.9 oz)   09/29/23 1055 57.2 kg (126 lb 1.7 oz)   09/29/23 1016 57.2 kg (126 lb)   09/06/23 0900 57.7 kg (127 lb 4.3 oz)   09/05/23 1014 58.4 kg (128 lb 12 oz)   08/30/23 0900 58.2 kg (128 lb 4.9 oz)   08/29/23 0952 59 kg (130 lb 1.1 oz)   08/24/23 0900 60.5 kg (133 lb 6.1 oz)   08/22/23 1001 61 kg (134 lb 7.7 oz)   08/18/23 1515 60 kg (132 lb 4.4 oz)         Estimated/Assessed Needs - Anthropometrics       Row Name 07/03/24 0757          Anthropometrics    Weight for Calculation 60.5 kg (133 lb 6.1 oz)        Estimated/Assessed Needs    Additional Documentation Fluid Requirements (Group);KCAL/KG (Group);Protein Requirements (Group)        KCAL/KG    KCAL/KG 35 Kcal/Kg (kcal);40 Kcal/Kg (kcal)     35 Kcal/Kg (kcal) 2117.5     40 Kcal/Kg (kcal) 2420        Protein Requirements    Weight Used For Protein Calculations 60.5 kg (133 lb 6.1 oz)     Est Protein Requirement Amount (gms/kg) 2.0 gm protein     Estimated Protein Requirements (gms/day) 121        Fluid Requirements    Fluid Requirements (mL/day) 2118     RDA Method (mL) 2118                       Labs/Medications        Pertinent Labs Reviewed.       Pertinent Medications ALPRAZolam, O2, acetaminophen, albuterol, albuterol sulfate HFA, azithromycin, bacitracin, cetirizine, clopidogrel, famotidine, furosemide, megestrol, mupirocin, nicotine polacrilex, ondansetron ODT, pain, pantoprazole, and predniSONE     Physical Findings        Malnutrition Severity Assessment       Row Name 07/03/24 0757          Malnutrition Severity Assessment    Malnutrition Type Chronic Disease - Related Malnutrition        Unintentional Weight Loss     Unintentional Weight Loss Findings --  pt has significant wt fluctuations therefore difficult to determine accurate wt decline        Muscle Loss    Loss of Muscle Mass Findings Moderate     [place] Region Moderate - slight depression        Fat Loss    Subcutaneous Fat Loss Findings Moderate     Orbital Region  Moderate -  somewhat hollowness, slightly dark circles        Criteria Met (Must meet criteria for severity in at least 2 of these categories: M Wasting, Fat Loss, Fluid, Secondary Signs, Wt. Status, Intake)    Patient meets criteria for  Moderate (non-severe) Malnutrition                      Current Nutrition Orders & Evaluation of Intake       Oral Nutrition     Current PO Diet Eating some very soft solid foods; recommended diet per speech therapy:  mech soft with ground meats, honey thickened liquids   Supplement Ensure (regular) shakes, unsure on amount consumed daily     Nutrition Diagnosis        Nutrition Dx Problem 1 Moderate malnutrition related to decreased ability to consume sufficient energy as evidenced by physiological causes increasing nutrient needs., hypermetabolic state., muscle wasting., fat loss., inadequate energy intake., and patient report.       Nutrition Intervention       RD Action Nutrition assessment by review of pt's medical record + pt interview (including discussion on):  Recent nutrition-related concerns and hx of wt changes  Current diet:   food, food consistency, liquid consistency & ONS consumption    Reviewed the following:  Answered several of pt's questions that were asked to OSW last week (details below).    Provided:  Samples of thickener   Samples of Boost Plus  Written summary of our conversation (see below)     Monitor/Evaluation       Monitor Per oncology nutrition protocol.     Comments:    Nutrition referral received.  Please refer to Oncology RD phone encounter from 7/1/24- spoke to pt's step-mother.    Saw pt in radiation therapy yesterday afternoon 7/2/24.  Answered pt's questions and addressed her concerns including kitchen equipment needed to alter foods to a mechanical soft consistency, where to purchase thickening agents, ways to maximize her nutritional intake, and steps we will take to apply pt for assistance toward her oral nutrition shakes.      Majority of pt's nutrition comes from Ensure shakes.  She said her PCP was going to set her up with Ensure deliveries (through insurance coverage) but this never occurred.  When Oncology RD called the only vendor [facility] has for oral nutrition services, they stated their policy had changed:  now only providing oral nutrition service to patient's who consume 100% of their nutritional intake from shakes.  Ms. Llanes also eats some solid food therefore does not qualify.  In an attempt to find an alternate vendor, called [employer] customer service to inquire about their Path[employer] Plus program.  A rep explained pt will qualify if the application is sent.  Filled out application, MD signed, and faxed yesterday afternoon.  Waiting on a response.  Provided thickener samples to pt and also explained how to obtain samples from Hyun, our speech therapist (address and detailed explanation given on building to go to).  Also provided 3 options for purchasing thickening agents in the future.  Explained that a  (no special brand) is best for grinding foods, however a   can also work.  Instructions on how to mechanically alter foods was given to pt along with food ideas.      Electronically signed by:  Cynthia Fonseca RD  07/03/24 07:58 EDT

## 2024-07-03 NOTE — PROGRESS NOTES
[facility] arranged patient's transportation through [employer] to her appointments for speech therapy at [facility] on July 11 scheduled at 9:30 AM [place] standard time confirmation #[id number] and [id number] and August 2, 2024 appointment with Dr. Nunez scheduled at 1:15 Eastern standard time confirmation #[id number] and [id number]

## 2024-07-04 LAB
QT INTERVAL: 384 MS
QTC INTERVAL: 483 MS

## 2024-07-05 ENCOUNTER — TELEPHONE (OUTPATIENT)
Dept: FAMILY MEDICINE CLINIC | Facility: CLINIC | Age: 59
End: 2024-07-05
Payer: COMMERCIAL

## 2024-07-05 DIAGNOSIS — G47.00 INSOMNIA, UNSPECIFIED TYPE: Primary | ICD-10-CM

## 2024-07-05 DIAGNOSIS — I35.0 NONRHEUMATIC AORTIC VALVE STENOSIS: ICD-10-CM

## 2024-07-05 DIAGNOSIS — C32.9 LARYNGEAL CANCER: ICD-10-CM

## 2024-07-12 ENCOUNTER — TELEPHONE (OUTPATIENT)
Dept: FAMILY MEDICINE CLINIC | Facility: CLINIC | Age: 59
End: 2024-07-12
Payer: COMMERCIAL

## 2024-07-12 ENCOUNTER — TELEPHONE (OUTPATIENT)
Dept: RADIATION ONCOLOGY | Facility: HOSPITAL | Age: 59
End: 2024-07-12
Payer: COMMERCIAL

## 2024-07-12 DIAGNOSIS — E11.59 TYPE 2 DIABETES MELLITUS WITH OTHER CIRCULATORY COMPLICATION, WITHOUT LONG-TERM CURRENT USE OF INSULIN: Primary | ICD-10-CM

## 2024-07-12 RX ORDER — BLOOD-GLUCOSE METER
EACH MISCELLANEOUS
Status: CANCELLED | OUTPATIENT
Start: 2024-07-12

## 2024-07-12 NOTE — TELEPHONE ENCOUNTER
Caller: [facility], [address] - [phone]  - [phone]    Relationship: Pharmacy    Best call back number: [phone]     What medication are you requesting: NEW GLUCOSE MONITOR    If a prescription is needed, what is your preferred pharmacy and phone number: [facility], [address] - [phone]  - [phone] FX     Additional notes: CALLER STATES PATIENT HAS LOST GLUCOSE MONITOR, PATIENT HAD ONE TOUCH ULTRA BUT IS NOW NEEDING A NEW ONE.

## 2024-07-12 NOTE — TELEPHONE ENCOUNTER
Diagnosis: History of head and neck cancer    Reason for Referral: Transportation    Content of Visit: [facility] contacted [employer] transportation this morning to arrange Ms. Llanes's transportation to her upcoming appointment with the speech-language pathologist on Thursday, 7/18/2024-confirmation #[id number], return #[phone].  [facility]W also arranged her transportation to her upcoming wound care appointment on 7/30/2024-confirmation #[id number], return #[id number].  My colleague has already arranged her transportation to her follow-up appointment with the radiation oncologist on 8/2/24.  [facility] support remains available.    Resources/Referrals Provided: [employer] transportation

## 2024-07-15 RX ORDER — BLOOD-GLUCOSE METER
KIT MISCELLANEOUS
Qty: 1 EACH | Refills: 0 | Status: SHIPPED | OUTPATIENT
Start: 2024-07-15

## 2024-07-15 RX ORDER — LANCETS 23 GAUGE
EACH MISCELLANEOUS
Qty: 100 EACH | Refills: 1 | Status: SHIPPED | OUTPATIENT
Start: 2024-07-15

## 2024-07-16 ENCOUNTER — TELEPHONE (OUTPATIENT)
Dept: FAMILY MEDICINE CLINIC | Facility: CLINIC | Age: 59
End: 2024-07-16
Payer: COMMERCIAL

## 2024-07-16 NOTE — TELEPHONE ENCOUNTER
Caller: Sheila Watson    Relationship to patient: Emergency Contact    Best call back number: [phone]    Patient is needing: PATIENT'S DAUGHTER CALLED IN TO SEE IF OFFICE HAD MADE AN APPOINTMENT WITH [employer] FOR PATIENT'S UPCOMING APPOINTMENT ON 8/6/2024 AND IF NOT IF THE OFFICE WOULD MAKE AN APPOINTMENT WITH [employer] FOR TRANSPORTATION.

## 2024-07-17 ENCOUNTER — TELEPHONE (OUTPATIENT)
Dept: RADIATION ONCOLOGY | Facility: HOSPITAL | Age: 59
End: 2024-07-17
Payer: COMMERCIAL

## 2024-07-17 NOTE — TELEPHONE ENCOUNTER
Diagnosis: History of head and neck cancer     Reason for Referral: Care coordination of nutritional needs, DME needs, housing    Content of Visit: Radiation oncology warmly transferred Ms. Llanes's daughter to OSW via telephone this morning. Her daughter reports Ms. Llanes was working with the dietitian on getting oral nutrition and thickening agents. Advised that the RD is out of the office this week, however, upon reviewing her last progress note it appears an application was sent to [employer] on 7/3/24. OSW will try to obtain a status update on this application. Additionally, advised that the RD had provided some thickening samples and reviewed three options for purchasing thickening agents in the future. Ms. Llanes's daughter reports she is unable to afford the expense. Ms. Llanes is scheduled to see the SLP tomorrow and therefore OSW will inquire if she has any additional samples to provide to her during that time. Furthermore, Ms. Llanes's daughter inquired about getting her incontinence supplies, both chuxs pads and Depends, size medium via [facility]. Advised that this will need ordered through the PCP office and OSW will make the , Monica RUIZ, aware to help facilitate. Ms. Llanes's daughter v/u. She reports they have a telephone interview today to get her home care assistance through the [facility] agency on aging, [facility]. Ms. Llanes's daughter reports they're also currently trying to help find Ms. Llanes housing for herself, as she's currently living with her and her children and the landlord is pressuring for her to leave or otherwise the daughter and her family may be evicted as well. Ms. Llanes's daughter is already aware that the Section 8 waiting list reopens next week and plans to sign Ms. Llanes up on Monday. She reports Ms. Llanes is also on a waiting list for low income housing through [facility]. Advised that unfortunately the [employer] of  [place] was recently only accepting applications for families and not individuals. Suggested contacting the Rapid Rehousing program through [employer] to see if they can be of any assistance and provided her with their contact information. Encouraged OSW support remains available. Her daughter expressed appreciation.     OSW consulted with SAM Wayne and she confirmed she will bring some thickening samples over tomorrow when she sees Ms. Llanes.    OSW consulted with SHAWN Duarte with the PCP office. She added the PCP's Katelin GUTIERREZ to the message to help facilitate the DME order for incontinence supplies.    OSW contacted [employer] to request a status update on the application. They advised a fax was sent, requesting clinical notes in order to complete the PA. OSW faxed these over this afternoon and notified the RD, Cynthia RUIZ, to follow-up on upon her return to the office.    Update 7/19/24 at 2205: PCP advised they've already ordered incontinence supplies and will check with MA on Monday.    Update 7/22/24: Oncology RD reports she contacted [facility] this morning and they confirmed they received the documentation OSW sent. They are working on a prior-auth for her insurance at this time. They do not need further information from us at this time. RD will call back to check in on the status in a couple of weeks if needed.    Resources/Referrals Provided: [facility] Rapid Rehousing through [employer], [employer]; care coordination of oral nutrition via [employer] and thickening samples via SLP, incontinence supplies via PCP through [facility]

## 2024-07-18 ENCOUNTER — HOSPITAL ENCOUNTER (OUTPATIENT)
Dept: RADIATION ONCOLOGY | Facility: HOSPITAL | Age: 59
Setting detail: RADIATION/ONCOLOGY SERIES
Discharge: HOME | End: 2024-07-18
Payer: COMMERCIAL

## 2024-07-18 DIAGNOSIS — R13.12 OROPHARYNGEAL DYSPHAGIA: Primary | ICD-10-CM

## 2024-07-18 PROCEDURE — 92610 EVALUATE SWALLOWING FUNCTION: CPT | Performed by: SPEECH-LANGUAGE PATHOLOGIST

## 2024-07-18 NOTE — THERAPY EVALUATION
Outpatient Speech Language Pathology   Adult Swallow Initial Evaluation [facility] Radiation Oncology     Patient Name: Isha Llanes  : 1965  MRN: [id number]  Today's Date: 2024         Visit Date: 2024         Outpatient Dysphagia Evaluation    History  Intake  Date of Service: 24  Physician: Goyo Padilla Physician Visit: ongoing  Diagnosis: supraglottic cancer, dysphagia  Treatment Diagnosis: oropharyngeal dysphagia  Hx of Hospitalization yes  Previous Function ::oropharyngeal dysphagia during and after radiation therapy.   Previous Therapy Services: yes, home health and acute care  Current Home Health Admission: No  Visit number: 1  HPI  Onset Date: 2023  Age: 58  Gender: female  History of diagnosis:   Ms. Llanes is a 37-year-old female who was diagnosed with a malignant neoplasm of supraglottis.  She received radiation therapy to the head and the neck with  7000 cGy in 35 fractions to address erosive lesion along the laryngeal surface of the epiglottis that essentially encompasses the entire laryngeal epiglottis.     Patient is complaining difficulty eating most things, intermittent nasal regurgitation, globus feeling after the swallow, odynophagia, and major vocal changes, Patient speaks in a raspy, breathy voice and feels like she can't get her breath.  Patient feels pain in the neck, bilaterally.  She indicates her pain is rated a 7/10.  Patient is on pain medication for her back and has a pain pump.     MBSS was completed in 2024 with diagnosis of  mild to moderate pharyngeal dysphagia characterized by deep laryngeal penetration with thin liquids nearly to the level of the cords with single sips.  Shallow penetration with nectar thick liquids.  No aspiration noted.  No significant pharyngeal residue noted.    Patient was recently (2024) for pneumonia, sepsis and respiratory failure and was treated for dysphagia.  Patient was placed on a honey thickened  liquids and mechanical soft solids. Different from the February 2024 MBSS.     Patient will be clinically assessed for dysphagia.     Precautions: swallow precaution, fall precautions  Patient's Goals/Expectations: Patient desires to increase safety of swallow and consume liquids and solids without swallow problems.     Current Diet Level: soft solids and thin liquids    Comments: Patient is living at her daughter's house in [place], [place].  Patient is smoking 1 ppd or more depending on mood.  Patient denies use of alcohol.       Psychosocial History    Usual Living Arrangement: lives with daughter  Psychosocial History (depression/anxiety) Panic attacks  Nutritional Problems: 30-40 pounds since radiation therapy    Past Medical History    Pertinent Past Medical History:   Past Medical History:   Diagnosis Date    Anesthesia     REPORTS HAS GOT REAL EMOTIONAL AND HAS BECOME ANGRY BEFORE    Anxiety     Aortic stenosis, severe     HAS BIO VALVE REPLACED    Arthritis     Asthma     Back pain     Blood clotting disorder     Cancer     Cancer associated pain     HODGKINS LYMPHOMA    CHF (congestive heart failure)     Chronic low back pain with sciatica     Chronic pain disorder     COPD (chronic obstructive pulmonary disease)     Coronary artery disease     Diabetes mellitus     TYPE 2    Diabetes mellitus     Dyspnea on effort     GERD (gastroesophageal reflux disease)     H/O lumpectomy     H/O: hysterectomy     Hiatal hernia     History of degenerative disc disease     History of kidney stones     History of pulmonary embolus (PE)     History of transfusion     AS A CHILD NO TRANSFUSION REACTION    History of transfusion     Hodgkin's lymphoma     5/19/23  5 YEARS SINCE LAST CHEMO TX    Hypertension     Immobility     Liver disease     DENIES ANY CURRENT ISSUES    Lupus     Mitral valve prolapse     Nausea vomiting and diarrhea 3/7/2024    Panic disorder     Pelvic pain     Peripheral neuropathy     Personal  history of DVT (deep vein thrombosis)     Presence of intrathecal pump     PTSD (post-traumatic stress disorder)     Sacroiliac joint disease     SI (sacroiliac) joint inflammation     Sleep apnea     Venous insufficiency             History of Seizures: no      Abuse    Patient being Hurt, Hit, Scared or Neglected?  no  Caregiver Neglect: no      Pain  Pain Intensity: 7  Pain Location: neck  Pain Scale Used: Numerical  Pain Management Techniques: pain pump for neck    Orientation    Level of Consciousness: alert and oriented times 3                  MBSS- 24    Patient Name: Isha Llanes                       : 1965                      MRN: [id number]  Today's Date: 2024                                                                             Admit Date: 2024     Visit Dx:   Visit Diagnosis       ICD-10-CM ICD-9-CM   1. Sepsis, due to unspecified organism, unspecified whether acute organ dysfunction present  A41.9 038.9       995.91   2. Pneumonia due to infectious organism, unspecified laterality, unspecified part of lung  J18.9 486   3. Acute respiratory failure with hypoxia and hypercapnia  J96.01 518.81     J96.02     4. Difficulty walking  R26.2 719.7                  MODIFIED BARIUM SWALLOW STUDY: SPEECH PATHOLOGY REPORT 24        PERTINENT INFORMATION:  This patient is a 58year old female admitted with the above diagnosis.  Patient with history of dysphagia with prior modified barium swallow in August showing high risk of aspiration with p.o. intake.  Patient has since had PEG removed and has been eating and drinking at home however did report difficulty swallowing at home with patient reporting nasal regurg at times..     Patient was referred for an MBSS by Dr. Navarro to rule out aspiration as well as to determine appropriate treatment plan for this patient.        PROCEDURE:     Patient was alert and cooperative.  The patient was viewed in lateral projection.  The  following Ba consistencies were administered: Thin liquids, nectar thick liquids, purée consistencies and soft solids.  The following compensatory swallowing strategies were performed: Bolus size modification, chin tuck        RESULTS:     1.  Oral stage is characterized by adequate bolus preparation and control for trials presented.  Mildly slow oral transit.  Pharyngeal phase appears timely.  Reduced laryngeal elevation and sluggish epiglottic movement.  Patient with laryngeal penetration with thin liquids, deep at times nearly to the level of the cords.  Attempted chin tuck however this was not effective in minimizing aspiration risk.  Shallow penetration intermittently with nectar thick liquids.  Tolerates purée and soft solids without penetration or aspiration.  No significant pharyngeal residue noted after the swallow        IMPRESSIONS:     Patient demonstrated mild to moderate pharyngeal dysphagia characterized by deep laryngeal penetration with thin liquids nearly to the level of the cords with single sips.  Shallow penetration with nectar thick liquids.  No aspiration noted.  No significant pharyngeal residue noted.      FUNCTIONAL DEFICIT: Patient scored level 4 of 7 on Functional Communication Measures for swallowing indicating a 40-59% limitation in function for current status, goal status, and discharge status.        RECOMMENDATIONS:   1.  Mechanical soft diet, fork mash as needed  2.  Single sips of nectar thick liquids  3.  Oral meds in applesauce crushed if needed  4.  Slow rate, small bites/drinks  5.  90 degrees upright for all intake           Yes: patient/responsible party agrees with the plan of care and has been informed of all alternatives, risks and benefits.     Thank you for this referral.             CLINICAL SWALLOW EVALUATION-7/18/24    Objective    Current Diet Level: soft solids and thin liquids  Oral Motor Exam: Patient is edentulous; decreased tongue base strength  Soft Palate:  uvula deviates to the left, patient indicated VPI  Laryngeal Function and Elevation: laryngeal deviation to palpation  Vocal Quality: extreme vocal hoarseness and breathiness, patient whispers  Swallow Reflex: intact  Positioning: upright ,90 degree hip  flexion       P.O. Trials   Patient was clinically assessed for dysphagia with the following consistencies and results:   Nectar thick liquid by spoon:WFL, Decreased laryngeal elevation , swallow with clear voice after the swallow  Nectar thick by cup: WFL  Thin Liquid by spoon: Patient complaining of VPI and water in the nasal cavity, voice clear; second trial yielded delayed cough with patient complaining of water caught in her throat.   Thin Liquid by cup: laryngeal elevation noted with instant cough and complaints of water in her left side of nasal cavity.    Thin liquid by straw: No trial secondary to safety concern.   Puree solid: WFL  Pudding: multiple swallow with clearing of bolus, voice clear and oral cavity clear.   Soft Solid: Patient is edentulous and takes increased time to masticate solids, multiple swallows with complaints of residue in the throat after the swallow.    Solid: No trials secondary to safety concerns.  Additional Trials: n/a  Oral Preparatory Phase: decreased secondary to being edentulous and xerostomia  Oral Phase: delay in swallow coordination and strength leaving residue in the pharynx and multiple swallows to clear solids.  Pharyngeal Phase: impaired with residue after the swallow  Laryngeal Elevation/Coordination: impaired secondary to MBSS indicating decrease laryngeal elevation and decreased epiglotticP  closure per MBSS    Comments: Patient demonstrates clinical signs and symptoms of aspiration with thin liquids. Patient demonstrates moderate to severe oropharyngeal dysphagia secondary to decrease swallow strength and timing, decreased tongue base and pharyngeal strength, decreased laryngeal elevation and coordination, VPI and  decreased vocal cord closure.  She would benefit from a trial of swallow therapy to strengthen and coordinate the swallow increasing safety of swallow and reducing aspiration that is leading to aspiration pneumonia and respiratory failure.       Dysphagia Criteria    Patient Demonstrates: History of aspiration, risk of aspiration, delayed swallow, decreased tongue base strength,  impaired salivary gland performance, significant weight loss, reduced laryngeal closure, inadequate laryngeal elevation, VPI and possible cricopharyngeal dysfunction (not diagnosed).    Swallow Function: Patient demonstrated overt, clinical signs and symptoms of aspiration with thin liquids.  Patient demonstrates oropharyngeal  dysphagia secondary to delay/coordination of swallow, .  Patient will benefit from a swallow therapy to increase overall swallow strength and coordination decreasing risk of aspiration that may lead to aspiration pneumonia and/or respiratory failure.      Recommendations    Diet:   Mechanical soft solids and nectar thickened liquids   Position:  Upright for all p.o. intake, upright 60 minutes after p.o. intake    Environment:  Quiet environment with ample time to feed.    Compensatory Techniques:  Fork mash solids, small bites and small sips, multiple swallows to clear, alternate liquids and solids.      Medical Intervention:  MBSS strongly recommended, ENT to directly visualize vocal cords, possible referral to gastroentologist.      ST Functional Communication Testing    Patient is rated on the Functional Communication Measures (FCM) for swallow at a level 4/7 with a 40-59% limitation.  With swallow therapy, patient is expected to reach a Functional Communication Measure level of 5/7 with a 20-39% swallow limitation.      Goals    Problem:  1 Swallow limitation of 40-59% FCM 4/7  LTG 1: 12 weeks:  The patient will demonstrate 20-39% swallow limitation by achieving a score of  5/7 on the Functional Communication  Measures for swallowing to increase safety of swallow to highest levels of functioning using compensatory strategies to reduce risk of aspiration.    STATUS:  New   STG 1a: 12 weeks Patient will accept 80% trials of thin liquids on  2 consecutive trials with min to no clinical signs and symptoms of aspiration to facilitate a safe swallow.    STATUS: New   STG 1b:  12 weeks: Patient will accept 80% trials of easy to masticate solids on two consecutive trials of different bolus' with   minimal clinical signs and symptoms of residual or aspiration after the swallow  to facilitate a safe swallow.    STATUS: New   STG 1c: 12 weeks: Patient will complete a snack and/or meal  using compensatory strategies with min assist and minimal clinical signs and symptoms of aspiration  to  facilitate a safe swallow.    STATUS: New   STG 1d: 12 weeks:  Patient will be educated on signs and symptoms of aspiration and diet with patient verbalizing understanding with moderate cueing.      STATUS: New    TREATMENT:  Swallowing Therapy to facilitate a safe swallow for all p.o. intake reducing risk of aspiration leading to pneumonia.            LTG 2: 2 weeks:  Patient will increase  pharyngeal, laryngeal and tongue base strength, laryngeal elevation and coordination of swallow)  to highest levels of functioning to decrease premature spillage of bolus prior and after the swallow, decrease residue after the swallow  and increase coordination of swallow reducing the risk of aspiration that may lead to pneumonia.      STATUS:  New          STG 2a: 12 weeks:  Patient will complete pharyngeal isometrics with min to mod assist to strengthen pharyngeal wall decreasing risk of residual after the swallow.     STATUS: New   STG 2b: 12 weeks:  Patient will complete tongue base isometrics to increase tongue base strength to a 4/5 reducing  residual after the swallow, and increasing timeliness of swallow.     STATUS: New   STG2c: 12 weeks:Patient will  complete coordination exercises with min to mod assist to  reduce premature spillage/timely eppiglottic closure prior, during and/or after  the swallow decreasing the risk of aspiration.    STATUS: New   STG 2d: 12 weeks:  Patient will be given a home exercise program and demonstrate understanding of the program with min to mod assist.    STATUS: New   STG 2e: 12 weeks:  Patient will complete laryngeal elevation exercises with min assist to increase laryngeal elevation, eppiglottic inversion and coordination reducing risk of aspiration.    STATUS: New   STG 2f: 12 Patient will complete Respiratory Muscle Training to include Expiratory Muscle Strength Training for 8 weeks to increase laryngeal elevation and coordination.      STATUS: New   TREATMENT: Swallow therapy to increase swallow coordination and strength for safe p.o. intake reducing risk of aspiration leading to possible aspiration pneumonia.         PLAN:   Frequency (times/week): 1 time per week  Duration: 12    Thank you for the referral,   Isha Vu MS, CCC-SLP     SIGNATURE: SAM Chaves    Electronically signed 7/18/2024    [place] License:  [id number]

## 2024-07-19 ENCOUNTER — OFFICE VISIT (OUTPATIENT)
Dept: SLEEP MEDICINE | Facility: HOSPITAL | Age: 59
End: 2024-07-19
Payer: COMMERCIAL

## 2024-07-19 DIAGNOSIS — F41.9 ANXIETY: ICD-10-CM

## 2024-07-19 DIAGNOSIS — F32.A DEPRESSION, UNSPECIFIED DEPRESSION TYPE: ICD-10-CM

## 2024-07-19 DIAGNOSIS — R29.818 SUSPECTED SLEEP APNEA: Primary | ICD-10-CM

## 2024-07-19 PROCEDURE — G0463 HOSPITAL OUTPT CLINIC VISIT: HCPCS | Performed by: FAMILY MEDICINE

## 2024-07-19 PROCEDURE — 99244 OFF/OP CNSLTJ NEW/EST MOD 40: CPT | Performed by: FAMILY MEDICINE

## 2024-07-20 ENCOUNTER — APPOINTMENT (OUTPATIENT)
Dept: CT IMAGING | Facility: HOSPITAL | Age: 59
End: 2024-07-20
Payer: COMMERCIAL

## 2024-07-20 ENCOUNTER — APPOINTMENT (OUTPATIENT)
Dept: GENERAL RADIOLOGY | Facility: HOSPITAL | Age: 59
End: 2024-07-20
Payer: COMMERCIAL

## 2024-07-20 ENCOUNTER — HOSPITAL ENCOUNTER (EMERGENCY)
Facility: HOSPITAL | Age: 59
Discharge: LEFT BEFORE DISCHARGE | End: 2024-07-20
Attending: EMERGENCY MEDICINE
Payer: COMMERCIAL

## 2024-07-20 VITALS
DIASTOLIC BLOOD PRESSURE: 89 MMHG | OXYGEN SATURATION: 97 % | BODY MASS INDEX: 21.44 KG/M2 | SYSTOLIC BLOOD PRESSURE: 114 MMHG | WEIGHT: 133.38 LBS | RESPIRATION RATE: 18 BRPM | TEMPERATURE: 97.6 F | HEART RATE: 80 BPM | HEIGHT: 66 IN

## 2024-07-20 DIAGNOSIS — R06.89 DIFFICULTY BREATHING: Primary | ICD-10-CM

## 2024-07-20 DIAGNOSIS — R49.0 HOARSENESS: ICD-10-CM

## 2024-07-20 LAB
ALBUMIN SERPL-MCNC: 3.8 G/DL (ref 3.5–5.2)
ALBUMIN/GLOB SERPL: 1.5 G/DL
ALP SERPL-CCNC: 59 U/L (ref 39–117)
ALT SERPL W P-5'-P-CCNC: 6 U/L (ref 1–33)
ANION GAP SERPL CALCULATED.3IONS-SCNC: 6.5 MMOL/L (ref 5–15)
AST SERPL-CCNC: 13 U/L (ref 1–32)
BASOPHILS # BLD AUTO: 0.01 10*3/MM3 (ref 0–0.2)
BASOPHILS NFR BLD AUTO: 0.1 % (ref 0–1.5)
BILIRUB SERPL-MCNC: <0.2 MG/DL (ref 0–1.2)
BUN SERPL-MCNC: 11 MG/DL (ref 6–20)
BUN/CREAT SERPL: 28.2 (ref 7–25)
CALCIUM SPEC-SCNC: 8.6 MG/DL (ref 8.6–10.5)
CHLORIDE SERPL-SCNC: 103 MMOL/L (ref 98–107)
CO2 SERPL-SCNC: 30.5 MMOL/L (ref 22–29)
CREAT SERPL-MCNC: 0.39 MG/DL (ref 0.57–1)
DEPRECATED RDW RBC AUTO: 46.1 FL (ref 37–54)
EGFRCR SERPLBLD CKD-EPI 2021: 115.6 ML/MIN/1.73
EOSINOPHIL # BLD AUTO: 0.1 10*3/MM3 (ref 0–0.4)
EOSINOPHIL NFR BLD AUTO: 1.5 % (ref 0.3–6.2)
ERYTHROCYTE [DISTWIDTH] IN BLOOD BY AUTOMATED COUNT: 13.4 % (ref 12.3–15.4)
FLUAV SUBTYP SPEC NAA+PROBE: NOT DETECTED
FLUBV RNA ISLT QL NAA+PROBE: NOT DETECTED
GEN 5 2HR TROPONIN T REFLEX: 14 NG/L
GLOBULIN UR ELPH-MCNC: 2.6 GM/DL
GLUCOSE SERPL-MCNC: 148 MG/DL (ref 65–99)
HCT VFR BLD AUTO: 40.7 % (ref 34–46.6)
HGB BLD-MCNC: 12.5 G/DL (ref 12–15.9)
HOLD SPECIMEN: NORMAL
HOLD SPECIMEN: NORMAL
IMM GRANULOCYTES # BLD AUTO: 0.03 10*3/MM3 (ref 0–0.05)
IMM GRANULOCYTES NFR BLD AUTO: 0.4 % (ref 0–0.5)
LIPASE SERPL-CCNC: 22 U/L (ref 13–60)
LYMPHOCYTES # BLD AUTO: 0.66 10*3/MM3 (ref 0.7–3.1)
LYMPHOCYTES NFR BLD AUTO: 9.7 % (ref 19.6–45.3)
MAGNESIUM SERPL-MCNC: 2 MG/DL (ref 1.6–2.6)
MCH RBC QN AUTO: 28.2 PG (ref 26.6–33)
MCHC RBC AUTO-ENTMCNC: 30.7 G/DL (ref 31.5–35.7)
MCV RBC AUTO: 91.9 FL (ref 79–97)
MONOCYTES # BLD AUTO: 0.51 10*3/MM3 (ref 0.1–0.9)
MONOCYTES NFR BLD AUTO: 7.5 % (ref 5–12)
NEUTROPHILS NFR BLD AUTO: 5.47 10*3/MM3 (ref 1.7–7)
NEUTROPHILS NFR BLD AUTO: 80.8 % (ref 42.7–76)
NRBC BLD AUTO-RTO: 0 /100 WBC (ref 0–0.2)
NT-PROBNP SERPL-MCNC: 195.6 PG/ML (ref 0–900)
PLATELET # BLD AUTO: 128 10*3/MM3 (ref 140–450)
PMV BLD AUTO: 10.2 FL (ref 6–12)
POTASSIUM SERPL-SCNC: 4 MMOL/L (ref 3.5–5.2)
PROT SERPL-MCNC: 6.4 G/DL (ref 6–8.5)
QT INTERVAL: 417 MS
QT INTERVAL: 426 MS
QTC INTERVAL: 473 MS
QTC INTERVAL: 489 MS
RBC # BLD AUTO: 4.43 10*6/MM3 (ref 3.77–5.28)
RSV RNA NPH QL NAA+NON-PROBE: NOT DETECTED
SARS-COV-2 RNA RESP QL NAA+PROBE: NOT DETECTED
SODIUM SERPL-SCNC: 140 MMOL/L (ref 136–145)
TROPONIN T DELTA: 0 NG/L
TROPONIN T SERPL HS-MCNC: 14 NG/L
WBC NRBC COR # BLD AUTO: 6.78 10*3/MM3 (ref 3.4–10.8)
WHOLE BLOOD HOLD COAG: NORMAL
WHOLE BLOOD HOLD SPECIMEN: NORMAL

## 2024-07-20 PROCEDURE — 84484 ASSAY OF TROPONIN QUANT: CPT | Performed by: EMERGENCY MEDICINE

## 2024-07-20 PROCEDURE — 93005 ELECTROCARDIOGRAM TRACING: CPT

## 2024-07-20 PROCEDURE — 87637 SARSCOV2&INF A&B&RSV AMP PRB: CPT | Performed by: EMERGENCY MEDICINE

## 2024-07-20 PROCEDURE — 83690 ASSAY OF LIPASE: CPT | Performed by: EMERGENCY MEDICINE

## 2024-07-20 PROCEDURE — 93005 ELECTROCARDIOGRAM TRACING: CPT | Performed by: EMERGENCY MEDICINE

## 2024-07-20 PROCEDURE — 96374 THER/PROPH/DIAG INJ IV PUSH: CPT

## 2024-07-20 PROCEDURE — 83735 ASSAY OF MAGNESIUM: CPT | Performed by: EMERGENCY MEDICINE

## 2024-07-20 PROCEDURE — 25010000002 METHYLPREDNISOLONE PER 125 MG: Performed by: EMERGENCY MEDICINE

## 2024-07-20 PROCEDURE — 71250 CT THORAX DX C-: CPT

## 2024-07-20 PROCEDURE — 85025 COMPLETE CBC W/AUTO DIFF WBC: CPT | Performed by: EMERGENCY MEDICINE

## 2024-07-20 PROCEDURE — 36415 COLL VENOUS BLD VENIPUNCTURE: CPT

## 2024-07-20 PROCEDURE — 80053 COMPREHEN METABOLIC PANEL: CPT | Performed by: EMERGENCY MEDICINE

## 2024-07-20 PROCEDURE — 70490 CT SOFT TISSUE NECK W/O DYE: CPT

## 2024-07-20 PROCEDURE — 71045 X-RAY EXAM CHEST 1 VIEW: CPT

## 2024-07-20 PROCEDURE — 99284 EMERGENCY DEPT VISIT MOD MDM: CPT

## 2024-07-20 PROCEDURE — 83880 ASSAY OF NATRIURETIC PEPTIDE: CPT | Performed by: EMERGENCY MEDICINE

## 2024-07-20 RX ORDER — SODIUM CHLORIDE 0.9 % (FLUSH) 0.9 %
10 SYRINGE (ML) INJECTION AS NEEDED
Status: DISCONTINUED | OUTPATIENT
Start: 2024-07-20 | End: 2024-07-20 | Stop reason: HOSPADM

## 2024-07-20 RX ORDER — ASPIRIN 81 MG/1
324 TABLET, CHEWABLE ORAL ONCE
Status: DISCONTINUED | OUTPATIENT
Start: 2024-07-20 | End: 2024-07-20 | Stop reason: HOSPADM

## 2024-07-20 RX ADMIN — METHYLPREDNISOLONE SODIUM SUCCINATE 125 MG: 125 INJECTION INTRAMUSCULAR; INTRAVENOUS at 08:57

## 2024-07-20 NOTE — ED PROVIDER NOTES
Time: 8:34 AM EDT  Date of encounter:  7/20/2024  Independent Historian/Clinical History and Information was obtained by:   Patient    History is limited by: N/A    Chief Complaint: Shortness of breath      History of Present Illness:  Patient is a 58 y.o. year old female who presents to the emergency department for evaluation of shortness of breath.  Patient states that she has a history of throat cancer who presents with complaints of shortness of breath.  States that she went out to smoke a cigarette today and she felt like she was having some difficulty breathing.  States that it was difficult for her to breathe in the middle of her neck.  She does report that she lost her voice several months ago and her breathing has gotten worse.  She does report that she is having some difficulty with swallowing and has to have thickened food because of aspiration.  She does report that she has been seen in the past for throat cancer and has had radiation.  Denies fever.  No other complaints this time.    HPI    Patient Care Team  Primary Care Provider: Katia Wright MD    Past Medical History:     Allergies   Allergen Reactions    Amoxicillin Shortness Of Breath     Has tolerated Cefazolin, Ceftriaxone, and Cefepime -Jermaine Melida, RPH    Ceclor [Cefaclor] Shortness Of Breath     Has tolerated Cefazolin, Ceftriaxone, and Cefepime -Jermaine Melida, RPH      Penicillins Shortness Of Breath     Has tolerated Cefazolin, Ceftriaxone, and Cefepime -Jermaine Melida, RPH    Sulfa Antibiotics Rash    Valium [Diazepam] Mental Status Change     DEPRESSED    Ambien [Zolpidem] Mental Status Change    Aspirin GI Intolerance    Ativan [Lorazepam] Mental Status Change    Benadryl [Diphenhydramine] Anxiety     AND MAKES HER HYPER    Biaxin [Clarithromycin] Unknown - Low Severity    Cephalexin Unknown - Low Severity    Clindamycin Unknown - Low Severity    Compazine [Prochlorperazine Edisylate] Rash    Contrast Dye (Echo Or Unknown  Ct/Mr) Other (See Comments)     Caused pain in her arm    Doxycycline Rash    Nsaids GI Intolerance    Phenergan [Promethazine Hcl] GI Intolerance    Promethazine GI Intolerance    Vancomycin Itching     Past Medical History:   Diagnosis Date    Anesthesia     REPORTS HAS GOT REAL EMOTIONAL AND HAS BECOME ANGRY BEFORE    Anxiety     Aortic stenosis, severe     HAS BIO VALVE REPLACED    Arthritis     Asthma     Back pain     Blood clotting disorder     Cancer     Cancer associated pain     HODGKINS LYMPHOMA    CHF (congestive heart failure)     Chronic low back pain with sciatica     Chronic pain disorder     COPD (chronic obstructive pulmonary disease)     Coronary artery disease     Diabetes mellitus     TYPE 2    Diabetes mellitus     Dyspnea on effort     GERD (gastroesophageal reflux disease)     H/O lumpectomy     H/O: hysterectomy     Hiatal hernia     History of degenerative disc disease     History of kidney stones     History of pulmonary embolus (PE)     History of transfusion     AS A CHILD NO TRANSFUSION REACTION    History of transfusion     Hodgkin's lymphoma     5/19/23  5 YEARS SINCE LAST CHEMO TX    Hypertension     Immobility     Liver disease     DENIES ANY CURRENT ISSUES    Lupus     Mitral valve prolapse     Nausea vomiting and diarrhea 3/7/2024    Panic disorder     Pelvic pain     Peripheral neuropathy     Personal history of DVT (deep vein thrombosis)     Presence of intrathecal pump     PTSD (post-traumatic stress disorder)     Sacroiliac joint disease     SI (sacroiliac) joint inflammation     Sleep apnea     Venous insufficiency      Past Surgical History:   Procedure Laterality Date    ABDOMINAL SURGERY      BACK SURGERY      SPINAL FUSION     BACK SURGERY      BLADDER REPAIR      CARDIAC CATHETERIZATION N/A 03/06/2019    Procedure: Valvuloplasty;  Surgeon: Wayne Johnson MD;  Location: [facility] CATH INVASIVE LOCATION;  Service: Cardiology    CARDIAC CATHETERIZATION      CARDIAC  CATHETERIZATION Left 5/31/2023    Procedure: Cardiac Catheterization/Vascular Study;  Surgeon: Poncho Reid MD;  Location: [facility] CATH INVASIVE LOCATION;  Service: Cardiovascular;  Laterality: Left;    CARDIAC SURGERY      CARDIAC VALVE REPLACEMENT  2021    CHOLECYSTECTOMY      COLONOSCOPY N/A 12/29/2021    Procedure: COLONOSCOPY;  Surgeon: Karine Orlando MD;  Location: [facility] ENDOSCOPY;  Service: Gastroenterology;  Laterality: N/A;  POOR PREP    COLONOSCOPY N/A 04/13/2022    Procedure: COLONOSCOPY WITH POLYPECTOMY;  Surgeon: Karine Orlando MD;  Location: [facility] ENDOSCOPY;  Service: Gastroenterology;  Laterality: N/A;  COLON POLYP, HEMORRHOIDS, POOR PREP    COLONOSCOPY      DIRECT LARYNGOSCOPY, ESOPHAGOSCOPY, BRONCHOSCOPY N/A 5/22/2023    Procedure: DIRECT LARYNGOSCOPY WITH BIOPSIES , ESOPHAGOSCOPY, BRONCHOSCOPY;  Surgeon: Ronnie Mcgarry MD;  Location: [facility] OR [facility];  Service: ENT;  Laterality: N/A;    ENDOSCOPY N/A 12/29/2021    Procedure: ESOPHAGOGASTRODUODENOSCOPY;  Surgeon: Karine Orlando MD;  Location: [facility] ENDOSCOPY;  Service: Gastroenterology;  Laterality: N/A;  ESOPHAGITIS, GASTRITIS, HIATAL HERNIA    ENDOSCOPY      ENDOSCOPY N/A 4/16/2024    Procedure: ESOPHAGOGASTRODUODENOSCOPY WITH BIOPSIES;  Surgeon: Karine Orlando MD;  Location: [facility] ENDOSCOPY;  Service: Gastroenterology;  Laterality: N/A;  ESOPHAGITIS, HIATAL HERNIA    HYSTERECTOMY      LYMPHADENECTOMY      PAIN PUMP INSERTION/REVISION N/A 10/18/2019    Procedure: PAIN PUMP INSERTION [facility] 10-18-19 [place];  Surgeon: Horace Rios MD;  Location: [facility]  Service: Pain Management    PAIN PUMP INSERTION/REVISION N/A 11/23/2020    Procedure: pain pump removal;  Surgeon: Horace Rios MD;  Location: [facility] OR;  Service: Pain Management;  Laterality: N/A;    PEG TUBE INSERTION N/A 7/26/2023    Procedure: PERCUTANEOUS ENDOSCOPIC GASTROSTOMY TUBE INSERTION;  Surgeon: Ruslan  Kody ALVARADO MD;  Location: [facility] ENDOSCOPY;  Service: General;  Laterality: N/A;  SUCCESSFUL PEG TUBE PLACE PLACEMENT    PORTACATH PLACEMENT      IN LEFT CHEST REPORTS THAT IT IS NOT FUNCTIONING    SKIN BIOPSY      TONSILLECTOMY      TUBAL ABDOMINAL LIGATION      TUMOR REMOVAL       Family History   Problem Relation Age of Onset    Heart failure Mother     Anxiety disorder Mother     Prostate cancer Father     Depression Sister     Bipolar disorder Sister     Pancreatic cancer Sister     ADD / ADHD Brother     Thyroid cancer Maternal Grandmother     Pancreatic cancer Paternal Grandmother     ADD / ADHD Grandson     ADD / ADHD Granddaughter     ADD / ADHD Nephew     Malig Hyperthermia Neg Hx        Home Medications:  Prior to Admission medications    Medication Sig Start Date End Date Taking? Authorizing Provider   acetaminophen (Tylenol) 325 MG tablet Take 1 tablet by mouth Every 6 (Six) Hours As Needed for Mild Pain, Headache or Fever.    Provider, MD Nette   albuterol (PROVENTIL) 2.5 MG/0.5ML nebulizer solution Take 2.5 mg by nebulization Every 4 (Four) Hours As Needed for Wheezing or Shortness of Air. 2/9/24   Virgil Navarro MD   albuterol sulfate  (90 Base) MCG/ACT inhaler Inhale 2 puffs Every 4 (Four) Hours As Needed for Wheezing. 11/20/23   Katia Wright MD   ALPRAZolam (Xanax) 0.5 MG tablet Take 1 tablet by mouth At Night As Needed for Anxiety for up to 30 days. 7/3/24 8/2/24  Katia Wright MD   azithromycin (ZITHROMAX) 250 MG tablet Take 1 tablet by mouth Daily. Indications: COPD Exacerbation Suppression, Pneumonia 6/14/24   Geraldo Gould,    bacitracin 500 UNIT/GM ointment Apply 1 Application topically to the appropriate area as directed 2 (Two) Times a Day. 4/10/24   Katia Wright MD   cetirizine (zyrTEC) 10 MG tablet Take 1 tablet by mouth Daily.  Patient not taking: Reported on 7/2/2024 10/31/23   Provider, MD Nette   clopidogrel (PLAVIX) 75 MG tablet Take 1 tablet by mouth  Daily.  Patient not taking: Reported on 7/2/2024 2/21/24   Katia Wright MD   famotidine (Pepcid) 20 MG tablet Take 1 tablet by mouth 2 (Two) Times a Day for 90 days.  Patient not taking: Reported on 7/2/2024 5/23/24 8/21/24  Katia Wright MD   furosemide (Lasix) 40 MG tablet Take 1 tablet by mouth Daily. 7/3/24   Katia Wright MD   glucose blood test strip Use as instructed to check blood sugar daily Dx E11.9 7/15/24   Katia Wright MD   glucose monitor monitoring kit Use as directed; check blood sugar once daily Dx E11.9 7/15/24   Katia Wright MD   Lancets 28G misc Use as directed; check blood sugar once daily  Dx E11.9 7/15/24   Katia Wright MD   megestrol (MEGACE) 20 MG tablet Take 1 tablet by mouth Daily.  Patient not taking: Reported on 7/2/2024 5/6/24   Provider, MD Nette   mupirocin (BACTROBAN) 2 % ointment Apply 1 Application topically to the appropriate area as directed 3 (Three) Times a Day. 4/10/24   Katia Wright MD   nicotine polacrilex (COMMIT) 2 MG lozenge Dissolve 1 lozenge in the mouth As Needed for Smoking Cessation.  Patient not taking: Reported on 7/2/2024 2/9/24   Virgil Navarro MD   O2 (OXYGEN) Inhale 1 (One) Time.    Provider, MD Nette   ondansetron ODT (ZOFRAN-ODT) 4 MG disintegrating tablet Place 1 tablet on the tongue Every 8 (Eight) Hours As Needed for Nausea or Vomiting. 5/30/24   Sebastian Gunn MD   pain patient supplied pump by Intrathecal route Continuous. Type of Medication: Hydromorphone 15 mg/ml and Bupivacaine 0.5 mg/ml  Provider:Dr. Boudreaux  Provider number: [phone]  Basal Dose: Hydromorphone 7.518 mg/day Bupivacaine 0.62784 mg/day  Pump capacity: 40ml  ( MAX of Hydromorphone 9.456 mg/ day; Bupivacaine 0.26955 mg /day)  Bolus Dose:Hydromorphone 0.500 mg, Bupivacaine 0.39612 mg  Max activations: 4 per day  Lockout 3 hours. 1 Bolus per every 3 hours   Next refill-  every 60 days 07/29/24   Alarm date- 08/06/24  Pump manufacture:MedManny Escobar MD  "  pantoprazole (PROTONIX) 20 MG EC tablet TAKE ONE TABLET BY MOUTH EVERY DAY 6/17/24   Jesús Sobeida M, APRN        Social History:   Social History     Tobacco Use    Smoking status: Every Day     Current packs/day: 1.50     Average packs/day: 1.5 packs/day for 45.5 years (68.3 ttl pk-yrs)     Types: Cigarettes     Start date: 1979     Passive exposure: Current    Smokeless tobacco: Never   Vaping Use    Vaping status: Never Used   Substance Use Topics    Alcohol use: Never    Drug use: Never         Review of Systems:  Review of Systems   HENT:  Positive for trouble swallowing.    Respiratory:  Positive for shortness of breath.         Physical Exam:  /89   Pulse 80   Temp 97.6 °F (36.4 °C) (Oral)   Resp 18   Ht 167.6 cm (66\")   Wt 60.5 kg (133 lb 6.1 oz)   SpO2 97%   BMI 21.53 kg/m²     Physical Exam  Vitals and nursing note reviewed.   Constitutional:       Appearance: Normal appearance.   HENT:      Head: Normocephalic and atraumatic.      Mouth/Throat:      Mouth: Mucous membranes are moist.      Comments: Hoarse voice  Eyes:      General: No scleral icterus.  Cardiovascular:      Rate and Rhythm: Normal rate and regular rhythm.      Heart sounds: Normal heart sounds.   Pulmonary:      Effort: Pulmonary effort is normal.      Breath sounds: Normal breath sounds.   Abdominal:      Palpations: Abdomen is soft.      Tenderness: There is no abdominal tenderness.   Musculoskeletal:         General: Normal range of motion.      Cervical back: Normal range of motion.   Skin:     Findings: No rash.   Neurological:      General: No focal deficit present.      Mental Status: She is alert.        4          Procedures:  Procedures      Medical Decision Making:      Comorbidities that affect care:    Cancer, COPD    External Notes reviewed:    Reviewed admission from 6/12/2024      The following orders were placed and all results were independently analyzed by me:  Orders Placed This Encounter "   Procedures    COVID-19, FLU A/B, RSV PCR 1 HR TAT - Swab, Nasopharynx    XR Chest 1 View    CT Soft Tissue Neck Without Contrast    CT Chest Without Contrast Diagnostic    Bureau Draw    High Sensitivity Troponin T    Comprehensive Metabolic Panel    Lipase    BNP    Magnesium    CBC Auto Differential    High Sensitivity Troponin T 2Hr    NPO Diet NPO Type: Strict NPO    Undress & Gown    Continuous Pulse Oximetry    Oxygen Therapy- Nasal Cannula; Titrate 1-6 LPM Per SpO2; 90 - 95%    ECG 12 Lead ED Triage Standing Order; Chest Pain    ECG 12 Lead ED Triage Standing Order; Chest Pain    Insert Peripheral IV    CBC & Differential    Green Top (Gel)    Lavender Top    Gold Top - SST    Light Blue Top       Medications Given in the Emergency Department:  Medications   sodium chloride 0.9 % flush 10 mL (has no administration in time range)   aspirin chewable tablet 324 mg (324 mg Oral Not Given 7/20/24 0909)   methylPREDNISolone sodium succinate (SOLU-Medrol) 125 mg in sterile water (preservative free) 2 mL (125 mg Intravenous Given 7/20/24 0857)        ED Course:    ED Course as of 07/20/24 1225   Sat Jul 20, 2024   0814 EKG interpreted by me  Time: 0 752  Heart rate 77  Sinus, left bundle branch block, nonspecific ST changes [MA]      ED Course User Index  [MA] Jimmy Hudson MD       Labs:    Lab Results (last 24 hours)       Procedure Component Value Units Date/Time    High Sensitivity Troponin T [[id number]]  (Abnormal) Collected: 07/20/24 0822    Specimen: Blood Updated: 07/20/24 0901     HS Troponin T 14 ng/L     Narrative:      High Sensitive Troponin T Reference Range:  <14.0 ng/L- Negative Female for AMI  <22.0 ng/L- Negative Male for AMI  >=14 - Abnormal Female indicating possible myocardial injury.  >=22 - Abnormal Male indicating possible myocardial injury.   Clinicians would have to utilize clinical acumen, EKG, Troponin, and serial changes to determine if it is an Acute Myocardial Infarction or  myocardial injury due to an underlying chronic condition.         CBC & Differential [[id number]]  (Abnormal) Collected: 07/20/24 0822    Specimen: Blood Updated: 07/20/24 0900    Narrative:      The following orders were created for panel order CBC & Differential.  Procedure                               Abnormality         Status                     ---------                               -----------         ------                     CBC Auto Differential[[id number]]        Abnormal            Final result               Scan Slide[[id number]]                                                                    Please view results for these tests on the individual orders.    Comprehensive Metabolic Panel [[id number]]  (Abnormal) Collected: 07/20/24 0822    Specimen: Blood Updated: 07/20/24 0858     Glucose 148 mg/dL      BUN 11 mg/dL      Creatinine 0.39 mg/dL      Sodium 140 mmol/L      Potassium 4.0 mmol/L      Chloride 103 mmol/L      CO2 30.5 mmol/L      Calcium 8.6 mg/dL      Total Protein 6.4 g/dL      Albumin 3.8 g/dL      ALT (SGPT) 6 U/L      AST (SGOT) 13 U/L      Alkaline Phosphatase 59 U/L      Total Bilirubin <0.2 mg/dL      Globulin 2.6 gm/dL      A/G Ratio 1.5 g/dL      BUN/Creatinine Ratio 28.2     Anion Gap 6.5 mmol/L      eGFR 115.6 mL/min/1.73     Narrative:      GFR Normal >60  Chronic Kidney Disease <60  Kidney Failure <15      Lipase [[id number]]  (Normal) Collected: 07/20/24 0822    Specimen: Blood Updated: 07/20/24 0858     Lipase 22 U/L     BNP [[id number]]  (Normal) Collected: 07/20/24 0822    Specimen: Blood Updated: 07/20/24 0900     proBNP 195.6 pg/mL     Narrative:      This assay is used as an aid in the diagnosis of individuals suspected of having heart failure. It can be used as an aid in the diagnosis of acute decompensated heart failure (ADHF) in patients presenting with signs and symptoms of ADHF to the emergency department (ED). In addition, NT-proBNP of <300 pg/mL indicates ADHF is  not likely.    Age Range Result Interpretation  NT-proBNP Concentration (pg/mL:      <50             Positive            >450                   Gray                 300-450                    Negative             <300    50-75           Positive            >900                  Gray                300-900                  Negative            <300      >75             Positive            >1800                  Gray                300-1800                  Negative            <300    Magnesium [[id number]]  (Normal) Collected: 07/20/24 0822    Specimen: Blood Updated: 07/20/24 0858     Magnesium 2.0 mg/dL     CBC Auto Differential [[id number]]  (Abnormal) Collected: 07/20/24 0822    Specimen: Blood Updated: 07/20/24 0900     WBC 6.78 10*3/mm3      RBC 4.43 10*6/mm3      Hemoglobin 12.5 g/dL      Hematocrit 40.7 %      MCV 91.9 fL      MCH 28.2 pg      MCHC 30.7 g/dL      RDW 13.4 %      RDW-SD 46.1 fl      MPV 10.2 fL      Platelets 128 10*3/mm3      Neutrophil % 80.8 %      Lymphocyte % 9.7 %      Monocyte % 7.5 %      Eosinophil % 1.5 %      Basophil % 0.1 %      Immature Grans % 0.4 %      Neutrophils, Absolute 5.47 10*3/mm3      Lymphocytes, Absolute 0.66 10*3/mm3      Monocytes, Absolute 0.51 10*3/mm3      Eosinophils, Absolute 0.10 10*3/mm3      Basophils, Absolute 0.01 10*3/mm3      Immature Grans, Absolute 0.03 10*3/mm3      nRBC 0.0 /100 WBC     COVID-19, FLU A/B, RSV PCR 1 HR TAT - Swab, Nasopharynx [[id number]]  (Normal) Collected: 07/20/24 0846    Specimen: Swab from Nasopharynx Updated: 07/20/24 0942     COVID19 Not Detected     Influenza A PCR Not Detected     Influenza B PCR Not Detected     RSV, PCR Not Detected    Narrative:      Fact sheet for providers: https://www.fda.gov/media/069928/download    Fact sheet for patients: https://www.fda.gov/media/865869/download    Test performed by PCR.    High Sensitivity Troponin T 2Hr [[id number]]  (Abnormal) Collected: 07/20/24 1046    Specimen: Blood Updated:  07/20/24 1116     HS Troponin T 14 ng/L      Troponin T Delta 0 ng/L     Narrative:      High Sensitive Troponin T Reference Range:  <14.0 ng/L- Negative Female for AMI  <22.0 ng/L- Negative Male for AMI  >=14 - Abnormal Female indicating possible myocardial injury.  >=22 - Abnormal Male indicating possible myocardial injury.   Clinicians would have to utilize clinical acumen, EKG, Troponin, and serial changes to determine if it is an Acute Myocardial Infarction or myocardial injury due to an underlying chronic condition.                  Imaging:    CT Chest Without Contrast Diagnostic    Result Date: 7/20/2024  CT CHEST WO CONTRAST DIAGNOSTIC Date of Exam: 7/20/2024 9:29 AM EDT Indication: Shortness of breath, cancer. Comparison: 9/5/2023. Technique: Axial CT images were obtained of the chest without contrast administration.  Reconstructed coronal and sagittal images were also obtained. Automated exposure control and iterative construction methods were used. Findings: Emphysema is present in the lung fields. There are new patchy areas of groundglass opacity in the lung fields. Consolidation in the right middle lobe has improved. There are no dense areas of airspace consolidation. No evidence of pleural or pericardial effusion. Prior TAVR procedure. There is a left-sided Port-A-Cath. There is calcification of the mitral annulus. There is a calcified granuloma in the right lower lobe. Scoliosis and degenerative change are present in the thoracic spine. There is no mediastinal, axillary or hilar adenopathy. Neurostimulator leads and infusion catheter are present in the thoracic spinal canal. Prior cholecystectomy.     Impression: 1. Patchy areas of groundglass opacity in the lung fields likely infectious or inflammatory process. Recommend a follow-up examination after treatment to confirm resolution. 2. Emphysema. Electronically Signed: José Cloud MD  7/20/2024 10:56 AM EDT  Workstation ID: [id number]    CT Soft  Tissue Neck Without Contrast    Result Date: 7/20/2024  CT SOFT TISSUE NECK WO CONTRAST Date of Exam: 7/20/2024 9:29 AM EDT Indication: Shortness of breath, difficulty swallowing, cancer. Comparison: 10/26/2023 Technique: Axial CT images were obtained of the neck without contrast administration.  Reconstructed coronal and sagittal images were also obtained. Automated exposure control and iterative construction methods were used. Findings: Examination is limited due to lack of IV contrast administration. No dominant mass is identified within the neck. No pathologically enlarged cervical lymph nodes are identified. No drainable fluid collection is seen. The parotid glands, submandibular glands, and thyroid gland appear unremarkable. Nonspecific prominence of the vocal folds and aryepiglottic folds, possibly related to post therapeutic change or underlying mass. Please correlate clinically to exclude laryngitis/laryngeal edema. Direct visualization may be beneficial. The pharyngeal mucosal surfaces are grossly unremarkable. No acute osseous lesion is identified. Bones are somewhat demineralized. There is moderate cervical spondylosis, most pronounced at C5-C7. Sclerosis within the C5-C7 vertebral bodies is similar since 10/25/2023. Left-sided central venous catheter is partially imaged.. Limited visualization of the intracranial compartment is unremarkable. Lung apices demonstrate emphysematous changes.     Impression: Examination is limited due to lack of IV contrast administration. Nonspecific prominence of the vocal folds and aryepiglottic folds, possibly related to post therapeutic change or underlying mass. Please correlate clinically to exclude laryngitis/laryngeal edema. Direct visualization may be beneficial. Further evaluation with contrast-enhanced CT may also be considered. Sclerosis within the C5-C7 vertebral bodies is similar since 10/25/2023. Electronically Signed: Jevon Baez MD  7/20/2024 10:40 AM  EDT  Workstation ID: [id number]    XR Chest 1 View    Result Date: 7/20/2024  XR CHEST 1 VW Date of Exam: 7/20/2024 7:53 AM EDT Indication: Chest Pain Triage Protocol Comparison: 6/12/2024. 4/27/2024. FINDINGS: The lungs are well-expanded. The heart and pulmonary vasculature are within normal limits. No pleural effusions are identified. There are no active appearing infiltrates. Prior TAVR procedure. There is a calcified granuloma in the right lower lobe. Stable left IJ Port-A-Cath.     No active disease. Electronically Signed: José Cloud MD  7/20/2024 8:12 AM EDT  Workstation ID: [id number]       Differential Diagnosis and Discussion:    Dyspnea: Differential diagnosis includes but is not limited to metabolic acidosis, neurological disorders, psychogenic, asthma, pneumothorax, upper airway obstruction, COPD, pneumonia, noncardiogenic pulmonary edema, interstitial lung disease, anemia, congestive heart failure, and pulmonary embolism  Sore Throat: Differential diagnosis includes but is not limited to bacterial infection, viral infection, inhaled irritants, sinus drainage, thyroiditis, epiglottitis, and retropharyngeal abscess.    All labs were reviewed and interpreted by me.  All X-rays impressions were independently interpreted by me.  EKG was interpreted by me.  CT scan radiology impression was interpreted by me.    MDM     Amount and/or Complexity of Data Reviewed  Clinical lab tests: reviewed  Tests in the radiology section of CPT®: reviewed  Tests in the medicine section of CPT®: reviewed       Patient is a 58-year-old female who presents with complaints of shortness of breath.  Has a history of throat cancer and worsening hoarseness.  States that she was having difficulty breathing if she spoke this morning.  On exam she is very hoarse.  CT of the neck shows some edema noted but it was also noted previously on ENTs notes.  I did discuss with the patient that she do would likely need to stay and have an  evaluation by ENT.  When I reevaluated the patient she stated she just wants to go home.  I did discuss that she will need to be seen by ENT and that she needs to return if she has new or worsening symptoms.  Did discuss risk and benefits with her and she still states that she wishes to be discharged and leave AMA.  Will DC.          Patient Care Considerations:          Consultants/Shared Management Plan:        Social Determinants of Health:    Patient is independent, reliable, and has access to care.       Disposition and Care Coordination:    AMA: Patient has decided to leave our facility against medical advice. I have assessed the patient´s ability to make an informed decision and it is my opinion at this time that the patient has the medical decision-making capacity to comprehend information regarding current medical condition and appreciates the impact of the disease or condition and the consequences of various options for treatment, including foregoing treatment. The patient possesses the ability to evaluate all treatment options, compare the risks and benefits of each option, communicate choice in a consistent manner over time, and is able to make rational choices. I have explained to the patient the further testing, treatment, and evaluation I would like to perform during the current emergency department visit as well as any possible alternatives that could be accomplished in a timely manner. I have outlined the possible risks of forgoing any all of these interventions and the patient understands and acknowledges that the decision to leave may result in undesirable consequences such as death, permanent disability, and/or loss of current lifestyle. Even though leaving AMA is not ideal, I have instructed the patient to follow any discharge instructions given, take any medications prescribed, and resume care as soon as possible with any other provider. Additionally, we clearly stated that the patient is  welcome to return it anytime to continue care at our facility.        Final diagnoses:   Difficulty breathing   Hoarseness        ED Disposition       ED Disposition   AMA    Condition   --    Comment   --               This medical record created using voice recognition software.             Jimmy Hudson MD  07/20/24 8885

## 2024-07-22 ENCOUNTER — TELEPHONE (OUTPATIENT)
Dept: NUTRITION | Facility: HOSPITAL | Age: 59
End: 2024-07-22
Payer: COMMERCIAL

## 2024-07-22 ENCOUNTER — TELEPHONE (OUTPATIENT)
Dept: FAMILY MEDICINE CLINIC | Facility: CLINIC | Age: 59
End: 2024-07-22

## 2024-07-22 DIAGNOSIS — F41.9 ANXIETY: ICD-10-CM

## 2024-07-22 NOTE — PROGRESS NOTES
Outpatient Nutrition Oncology Follow Up    Patient Name: Isha Llanes  YOB: 1965  MRN: [id number]      Reason for Consult:  F/U with [employer] program       Consult or Intervention:   Received referral from Tamiko SMITH, indicating she spoke to pt's DA regarding the oral nutrition supplement request sent to [employer] program.  DA had questions and requested an update.  Tamiko called [employer] and staff indicated they need further documentation- Tamiko faxed over appropriate information.  F/U today:  called [employer] [facility] to make sure all was taken care of.  Staff at [employer] indicated they received all necessary documentation and the DME company is currently working on a pre-authorization for pt's insurance.  [facility] staff indicated nothing further is needed from our staff at this time.    Plan:  Oncology RD remains available per protocol.

## 2024-07-24 ENCOUNTER — TELEPHONE (OUTPATIENT)
Dept: RADIATION ONCOLOGY | Facility: HOSPITAL | Age: 59
End: 2024-07-24
Payer: COMMERCIAL

## 2024-07-24 NOTE — TELEPHONE ENCOUNTER
Diagnosis: History of head and neck cancer    Reason for Referral: Transportation    Content of Visit: OSW received a notification from Evi ROBERTSON MA in radiation oncology that Ms. Llanes will be coming in every Thursday at 1000 to see the SLP for the next 11 weeks, beginning 8/1/24-10/10/24. OSW contacted [employer] to arrange - confirmation #[id number], return #[phone]. OSW support remains available.     Resources/Referrals Provided: [employer]

## 2024-07-26 ENCOUNTER — DOCUMENTATION (OUTPATIENT)
Dept: RADIATION ONCOLOGY | Facility: HOSPITAL | Age: 59
End: 2024-07-26
Payer: COMMERCIAL

## 2024-07-26 NOTE — PROGRESS NOTES
Patient called and reports that her neck is swollen and her throat is painful.  I asked patient if she was having increased difficulty swallowing or breathing and she reports no to both.  She states that it is difficult to swallow and she is already having swallowing issues.  She asked if Dr. Nunez could call her in pain medication and I advised her that per previous conversations between her and Dr. Nunez that he will not be prescribing her anymore pain medication due to seeing pain management. I told her that I could ask Dr. Nunez if he would be ok calling in [facility]No[employer] but patient did not want that medication.  I told her I would get her in to see Dr. Nunez and he could evaluate her swelling and pain and she was ok with this.  She is scheduled for 8/5 at 2:30.  I advised patient that I would reach out to Tamiko and get her scheduled for transportation to/from her appt.  Patient v/u and all questions/concerns were taken care of.  Advised patient that if her swelling increases and she has difficulty breathing she should call 911 immediately.  Patient stated that she would.

## 2024-07-29 ENCOUNTER — TELEPHONE (OUTPATIENT)
Dept: RADIATION ONCOLOGY | Facility: HOSPITAL | Age: 59
End: 2024-07-29
Payer: COMMERCIAL

## 2024-07-29 LAB
ALBUMIN SERPL-MCNC: 4 G/DL (ref 3.5–5.2)
ALBUMIN/GLOB SERPL: 1.4 G/DL
ALP SERPL-CCNC: 57 U/L (ref 39–117)
ALT SERPL W P-5'-P-CCNC: 7 U/L (ref 1–33)
ANION GAP SERPL CALCULATED.3IONS-SCNC: 11.4 MMOL/L (ref 5–15)
AST SERPL-CCNC: 14 U/L (ref 1–32)
BASOPHILS # BLD AUTO: 0.01 10*3/MM3 (ref 0–0.2)
BASOPHILS NFR BLD AUTO: 0.2 % (ref 0–1.5)
BILIRUB SERPL-MCNC: <0.2 MG/DL (ref 0–1.2)
BUN SERPL-MCNC: 13 MG/DL (ref 6–20)
BUN/CREAT SERPL: 28.9 (ref 7–25)
CALCIUM SPEC-SCNC: 8.9 MG/DL (ref 8.6–10.5)
CHLORIDE SERPL-SCNC: 103 MMOL/L (ref 98–107)
CO2 SERPL-SCNC: 26.6 MMOL/L (ref 22–29)
CREAT SERPL-MCNC: 0.45 MG/DL (ref 0.57–1)
DEPRECATED RDW RBC AUTO: 45.3 FL (ref 37–54)
EGFRCR SERPLBLD CKD-EPI 2021: 111.7 ML/MIN/1.73
EOSINOPHIL # BLD AUTO: 0.04 10*3/MM3 (ref 0–0.4)
EOSINOPHIL NFR BLD AUTO: 0.6 % (ref 0.3–6.2)
ERYTHROCYTE [DISTWIDTH] IN BLOOD BY AUTOMATED COUNT: 13.7 % (ref 12.3–15.4)
GLOBULIN UR ELPH-MCNC: 2.8 GM/DL
GLUCOSE SERPL-MCNC: 168 MG/DL (ref 65–99)
HCT VFR BLD AUTO: 44.3 % (ref 34–46.6)
HGB BLD-MCNC: 14.1 G/DL (ref 12–15.9)
HOLD SPECIMEN: NORMAL
HOLD SPECIMEN: NORMAL
IMM GRANULOCYTES # BLD AUTO: 0.04 10*3/MM3 (ref 0–0.05)
IMM GRANULOCYTES NFR BLD AUTO: 0.6 % (ref 0–0.5)
LYMPHOCYTES # BLD AUTO: 0.71 10*3/MM3 (ref 0.7–3.1)
LYMPHOCYTES NFR BLD AUTO: 10.9 % (ref 19.6–45.3)
MCH RBC QN AUTO: 28.6 PG (ref 26.6–33)
MCHC RBC AUTO-ENTMCNC: 31.8 G/DL (ref 31.5–35.7)
MCV RBC AUTO: 89.9 FL (ref 79–97)
MONOCYTES # BLD AUTO: 0.44 10*3/MM3 (ref 0.1–0.9)
MONOCYTES NFR BLD AUTO: 6.8 % (ref 5–12)
NEUTROPHILS NFR BLD AUTO: 5.27 10*3/MM3 (ref 1.7–7)
NEUTROPHILS NFR BLD AUTO: 80.9 % (ref 42.7–76)
NRBC BLD AUTO-RTO: 0 /100 WBC (ref 0–0.2)
PLATELET # BLD AUTO: 189 10*3/MM3 (ref 140–450)
PMV BLD AUTO: 9.9 FL (ref 6–12)
POTASSIUM SERPL-SCNC: 3.9 MMOL/L (ref 3.5–5.2)
PROT SERPL-MCNC: 6.8 G/DL (ref 6–8.5)
RBC # BLD AUTO: 4.93 10*6/MM3 (ref 3.77–5.28)
SODIUM SERPL-SCNC: 141 MMOL/L (ref 136–145)
WBC NRBC COR # BLD AUTO: 6.51 10*3/MM3 (ref 3.4–10.8)
WHOLE BLOOD HOLD COAG: NORMAL
WHOLE BLOOD HOLD SPECIMEN: NORMAL

## 2024-07-29 PROCEDURE — 80053 COMPREHEN METABOLIC PANEL: CPT | Performed by: REGISTERED NURSE

## 2024-07-29 PROCEDURE — 96375 TX/PRO/DX INJ NEW DRUG ADDON: CPT

## 2024-07-29 PROCEDURE — 99211 OFF/OP EST MAY X REQ PHY/QHP: CPT

## 2024-07-29 PROCEDURE — 96365 THER/PROPH/DIAG IV INF INIT: CPT

## 2024-07-29 PROCEDURE — 36415 COLL VENOUS BLD VENIPUNCTURE: CPT | Performed by: REGISTERED NURSE

## 2024-07-29 PROCEDURE — 85025 COMPLETE CBC W/AUTO DIFF WBC: CPT | Performed by: REGISTERED NURSE

## 2024-07-29 PROCEDURE — 99285 EMERGENCY DEPT VISIT HI MDM: CPT

## 2024-07-29 PROCEDURE — 83880 ASSAY OF NATRIURETIC PEPTIDE: CPT | Performed by: STUDENT IN AN ORGANIZED HEALTH CARE EDUCATION/TRAINING PROGRAM

## 2024-07-29 NOTE — TELEPHONE ENCOUNTER
Diagnosis: History of head and neck cancer    Reason for Referral: Transportation    Content of Visit: OSW assistance requested by Evi ROBERTSON MA in radiation oncology advising that Ms. Llanes has been rescheduled to see the radiation oncologist on 8/5/24 at 0900. OSW contacted [employer] to cancel her transportation previously arranged for 8/2/24 and rearranged for 8/5/24 at 0900 - confirmation #[id number], return #[phone]. OSW also arranged her transportation to see her PCP on 8/6/24 at 0900 - confirmation #[id number], return #[phone] and to wound care after her 1000 SLP appointment on 8/15/24 around 1030 - confirmation #[id number], #[id number], and return #[phone]. OSW support remains available.    Resources/Referrals Provided: [employer]

## 2024-07-30 ENCOUNTER — HOSPITAL ENCOUNTER (OUTPATIENT)
Facility: HOSPITAL | Age: 59
Setting detail: OBSERVATION
Discharge: LEFT BEFORE DISCHARGE | End: 2024-07-30
Attending: EMERGENCY MEDICINE | Admitting: STUDENT IN AN ORGANIZED HEALTH CARE EDUCATION/TRAINING PROGRAM
Payer: COMMERCIAL

## 2024-07-30 ENCOUNTER — APPOINTMENT (OUTPATIENT)
Dept: GENERAL RADIOLOGY | Facility: HOSPITAL | Age: 59
End: 2024-07-30
Payer: COMMERCIAL

## 2024-07-30 VITALS
RESPIRATION RATE: 20 BRPM | HEART RATE: 80 BPM | DIASTOLIC BLOOD PRESSURE: 75 MMHG | SYSTOLIC BLOOD PRESSURE: 126 MMHG | HEIGHT: 64 IN | BODY MASS INDEX: 22.89 KG/M2 | OXYGEN SATURATION: 95 % | TEMPERATURE: 98.8 F

## 2024-07-30 DIAGNOSIS — R13.10 DIFFICULTY SWALLOWING LIQUIDS: ICD-10-CM

## 2024-07-30 DIAGNOSIS — B37.81 THRUSH OF MOUTH AND ESOPHAGUS: ICD-10-CM

## 2024-07-30 DIAGNOSIS — N39.0 ACUTE UTI: Primary | ICD-10-CM

## 2024-07-30 DIAGNOSIS — B37.0 THRUSH OF MOUTH AND ESOPHAGUS: ICD-10-CM

## 2024-07-30 LAB
AMORPH URATE CRY URNS QL MICRO: ABNORMAL /HPF
BACTERIA UR QL AUTO: ABNORMAL /HPF
BILIRUB UR QL STRIP: NEGATIVE
CLARITY UR: ABNORMAL
COLOR UR: YELLOW
GLUCOSE BLDC GLUCOMTR-MCNC: 122 MG/DL (ref 70–99)
GLUCOSE UR STRIP-MCNC: NEGATIVE MG/DL
HGB UR QL STRIP.AUTO: ABNORMAL
HYALINE CASTS UR QL AUTO: ABNORMAL /LPF
KETONES UR QL STRIP: NEGATIVE
LEUKOCYTE ESTERASE UR QL STRIP.AUTO: ABNORMAL
NITRITE UR QL STRIP: NEGATIVE
NT-PROBNP SERPL-MCNC: 220 PG/ML (ref 0–900)
PH UR STRIP.AUTO: 7 [PH] (ref 5–8)
PROT UR QL STRIP: ABNORMAL
QT INTERVAL: 390 MS
QT INTERVAL: 417 MS
QT INTERVAL: 426 MS
QTC INTERVAL: 473 MS
QTC INTERVAL: 483 MS
QTC INTERVAL: 489 MS
RBC # UR STRIP: ABNORMAL /HPF
REF LAB TEST METHOD: ABNORMAL
SP GR UR STRIP: 1.02 (ref 1–1.03)
SQUAMOUS #/AREA URNS HPF: ABNORMAL /HPF
STARCH GRANULES URNS QL MICRO: ABNORMAL /HPF
UROBILINOGEN UR QL STRIP: ABNORMAL
WBC # UR STRIP: ABNORMAL /HPF

## 2024-07-30 PROCEDURE — 25010000002 CEFTRIAXONE PER 250 MG: Performed by: EMERGENCY MEDICINE

## 2024-07-30 PROCEDURE — 25010000002 HEPARIN (PORCINE) PER 1000 UNITS: Performed by: STUDENT IN AN ORGANIZED HEALTH CARE EDUCATION/TRAINING PROGRAM

## 2024-07-30 PROCEDURE — 87086 URINE CULTURE/COLONY COUNT: CPT | Performed by: EMERGENCY MEDICINE

## 2024-07-30 PROCEDURE — 94664 DEMO&/EVAL PT USE INHALER: CPT

## 2024-07-30 PROCEDURE — 25010000002 ONDANSETRON PER 1 MG: Performed by: STUDENT IN AN ORGANIZED HEALTH CARE EDUCATION/TRAINING PROGRAM

## 2024-07-30 PROCEDURE — 82948 REAGENT STRIP/BLOOD GLUCOSE: CPT

## 2024-07-30 PROCEDURE — 96365 THER/PROPH/DIAG IV INF INIT: CPT

## 2024-07-30 PROCEDURE — G0378 HOSPITAL OBSERVATION PER HR: HCPCS

## 2024-07-30 PROCEDURE — 25810000003 SODIUM CHLORIDE 0.9 % SOLUTION: Performed by: EMERGENCY MEDICINE

## 2024-07-30 PROCEDURE — 96372 THER/PROPH/DIAG INJ SC/IM: CPT

## 2024-07-30 PROCEDURE — 94640 AIRWAY INHALATION TREATMENT: CPT

## 2024-07-30 PROCEDURE — 81001 URINALYSIS AUTO W/SCOPE: CPT | Performed by: EMERGENCY MEDICINE

## 2024-07-30 PROCEDURE — 25010000002 HYDROMORPHONE 1 MG/ML SOLUTION: Performed by: EMERGENCY MEDICINE

## 2024-07-30 PROCEDURE — 96375 TX/PRO/DX INJ NEW DRUG ADDON: CPT

## 2024-07-30 PROCEDURE — 93005 ELECTROCARDIOGRAM TRACING: CPT | Performed by: STUDENT IN AN ORGANIZED HEALTH CARE EDUCATION/TRAINING PROGRAM

## 2024-07-30 PROCEDURE — 25010000002 ONDANSETRON PER 1 MG: Performed by: EMERGENCY MEDICINE

## 2024-07-30 PROCEDURE — 99223 1ST HOSP IP/OBS HIGH 75: CPT | Performed by: STUDENT IN AN ORGANIZED HEALTH CARE EDUCATION/TRAINING PROGRAM

## 2024-07-30 PROCEDURE — 71045 X-RAY EXAM CHEST 1 VIEW: CPT

## 2024-07-30 PROCEDURE — 94799 UNLISTED PULMONARY SVC/PX: CPT

## 2024-07-30 PROCEDURE — 96376 TX/PRO/DX INJ SAME DRUG ADON: CPT

## 2024-07-30 RX ORDER — IBUPROFEN 600 MG/1
1 TABLET ORAL
Status: DISCONTINUED | OUTPATIENT
Start: 2024-07-30 | End: 2024-07-30 | Stop reason: HOSPADM

## 2024-07-30 RX ORDER — AMOXICILLIN 250 MG
2 CAPSULE ORAL 2 TIMES DAILY PRN
Status: DISCONTINUED | OUTPATIENT
Start: 2024-07-30 | End: 2024-07-30 | Stop reason: HOSPADM

## 2024-07-30 RX ORDER — NICOTINE 21 MG/24HR
1 PATCH, TRANSDERMAL 24 HOURS TRANSDERMAL EVERY 24 HOURS
COMMUNITY

## 2024-07-30 RX ORDER — SODIUM CHLORIDE 0.9 % (FLUSH) 0.9 %
10 SYRINGE (ML) INJECTION AS NEEDED
Status: DISCONTINUED | OUTPATIENT
Start: 2024-07-30 | End: 2024-07-30 | Stop reason: HOSPADM

## 2024-07-30 RX ORDER — ONDANSETRON 2 MG/ML
4 INJECTION INTRAMUSCULAR; INTRAVENOUS EVERY 6 HOURS PRN
Status: DISCONTINUED | OUTPATIENT
Start: 2024-07-30 | End: 2024-07-30 | Stop reason: HOSPADM

## 2024-07-30 RX ORDER — IPRATROPIUM BROMIDE AND ALBUTEROL SULFATE 2.5; .5 MG/3ML; MG/3ML
3 SOLUTION RESPIRATORY (INHALATION) EVERY 6 HOURS PRN
Status: DISCONTINUED | OUTPATIENT
Start: 2024-07-30 | End: 2024-07-30 | Stop reason: HOSPADM

## 2024-07-30 RX ORDER — BISACODYL 10 MG
10 SUPPOSITORY, RECTAL RECTAL DAILY PRN
Status: DISCONTINUED | OUTPATIENT
Start: 2024-07-30 | End: 2024-07-30 | Stop reason: HOSPADM

## 2024-07-30 RX ORDER — ALPRAZOLAM 0.25 MG/1
0.5 TABLET ORAL 2 TIMES DAILY PRN
Status: DISCONTINUED | OUTPATIENT
Start: 2024-07-30 | End: 2024-07-30 | Stop reason: HOSPADM

## 2024-07-30 RX ORDER — NICOTINE POLACRILEX 4 MG
15 LOZENGE BUCCAL
Status: DISCONTINUED | OUTPATIENT
Start: 2024-07-30 | End: 2024-07-30 | Stop reason: SDUPTHER

## 2024-07-30 RX ORDER — HEPARIN SODIUM 5000 [USP'U]/ML
5000 INJECTION, SOLUTION INTRAVENOUS; SUBCUTANEOUS EVERY 8 HOURS SCHEDULED
Status: DISCONTINUED | OUTPATIENT
Start: 2024-07-30 | End: 2024-07-30 | Stop reason: HOSPADM

## 2024-07-30 RX ORDER — DEXTROSE MONOHYDRATE 25 G/50ML
25 INJECTION, SOLUTION INTRAVENOUS
Status: DISCONTINUED | OUTPATIENT
Start: 2024-07-30 | End: 2024-07-30 | Stop reason: SDUPTHER

## 2024-07-30 RX ORDER — NICOTINE POLACRILEX 4 MG
15 LOZENGE BUCCAL
Status: DISCONTINUED | OUTPATIENT
Start: 2024-07-30 | End: 2024-07-30 | Stop reason: HOSPADM

## 2024-07-30 RX ORDER — GABAPENTIN 300 MG/1
300 CAPSULE ORAL 4 TIMES DAILY
COMMUNITY
Start: 2024-07-03

## 2024-07-30 RX ORDER — ONDANSETRON 2 MG/ML
4 INJECTION INTRAMUSCULAR; INTRAVENOUS ONCE
Status: COMPLETED | OUTPATIENT
Start: 2024-07-30 | End: 2024-07-30

## 2024-07-30 RX ORDER — SODIUM CHLORIDE 9 MG/ML
40 INJECTION, SOLUTION INTRAVENOUS AS NEEDED
Status: DISCONTINUED | OUTPATIENT
Start: 2024-07-30 | End: 2024-07-30 | Stop reason: HOSPADM

## 2024-07-30 RX ORDER — POLYETHYLENE GLYCOL 3350 17 G/17G
17 POWDER, FOR SOLUTION ORAL DAILY PRN
Status: DISCONTINUED | OUTPATIENT
Start: 2024-07-30 | End: 2024-07-30 | Stop reason: HOSPADM

## 2024-07-30 RX ORDER — SODIUM CHLORIDE 0.9 % (FLUSH) 0.9 %
10 SYRINGE (ML) INJECTION EVERY 12 HOURS SCHEDULED
Status: DISCONTINUED | OUTPATIENT
Start: 2024-07-30 | End: 2024-07-30 | Stop reason: HOSPADM

## 2024-07-30 RX ORDER — DEXTROSE MONOHYDRATE 25 G/50ML
25 INJECTION, SOLUTION INTRAVENOUS
Status: DISCONTINUED | OUTPATIENT
Start: 2024-07-30 | End: 2024-07-30 | Stop reason: HOSPADM

## 2024-07-30 RX ORDER — PREDNISONE 10 MG/1
10 TABLET ORAL DAILY
COMMUNITY
Start: 2024-07-25

## 2024-07-30 RX ORDER — IBUPROFEN 600 MG/1
1 TABLET ORAL
Status: DISCONTINUED | OUTPATIENT
Start: 2024-07-30 | End: 2024-07-30 | Stop reason: SDUPTHER

## 2024-07-30 RX ORDER — ARFORMOTEROL TARTRATE 15 UG/2ML
15 SOLUTION RESPIRATORY (INHALATION)
Status: DISCONTINUED | OUTPATIENT
Start: 2024-07-30 | End: 2024-07-30 | Stop reason: HOSPADM

## 2024-07-30 RX ORDER — BISACODYL 5 MG/1
5 TABLET, DELAYED RELEASE ORAL DAILY PRN
Status: DISCONTINUED | OUTPATIENT
Start: 2024-07-30 | End: 2024-07-30 | Stop reason: HOSPADM

## 2024-07-30 RX ADMIN — HEPARIN SODIUM 5000 UNITS: 5000 INJECTION INTRAVENOUS; SUBCUTANEOUS at 08:56

## 2024-07-30 RX ADMIN — ONDANSETRON 4 MG: 2 INJECTION INTRAMUSCULAR; INTRAVENOUS at 10:46

## 2024-07-30 RX ADMIN — ALPRAZOLAM 0.5 MG: 0.25 TABLET ORAL at 10:46

## 2024-07-30 RX ADMIN — Medication 10 ML: at 08:57

## 2024-07-30 RX ADMIN — ARFORMOTEROL TARTRATE 15 MCG: 15 SOLUTION RESPIRATORY (INHALATION) at 10:21

## 2024-07-30 RX ADMIN — NYSTATIN 500000 UNITS: 100000 SUSPENSION ORAL at 04:59

## 2024-07-30 RX ADMIN — CEFTRIAXONE SODIUM 1000 MG: 1 INJECTION, POWDER, FOR SOLUTION INTRAMUSCULAR; INTRAVENOUS at 04:59

## 2024-07-30 RX ADMIN — ONDANSETRON 4 MG: 2 INJECTION INTRAMUSCULAR; INTRAVENOUS at 03:51

## 2024-07-30 RX ADMIN — HYDROMORPHONE HYDROCHLORIDE 1 MG: 1 INJECTION, SOLUTION INTRAMUSCULAR; INTRAVENOUS; SUBCUTANEOUS at 03:51

## 2024-07-30 RX ADMIN — SODIUM CHLORIDE 1000 ML: 9 INJECTION, SOLUTION INTRAVENOUS at 03:50

## 2024-07-30 NOTE — ED PROVIDER NOTES
Time: 10:50 PM EDT  Date of encounter:  7/29/2024  Independent Historian/Clinical History and Information was obtained by:   Patient    History is limited by: N/A    Chief Complaint   Patient presents with    Difficulty Swallowing    Leg Injury     Swelling and sores           History of Present Illness:  Patient is a 58 y.o. year old female who presents to the emergency department for evaluation of difficulty swallowing and inability to eat.  Patient is currently undergoing radiation for throat cancer.  Also complains of swelling and sores on her legs.  LUZ Montenegro    Patient Care Team  Primary Care Provider: Katia Wright MD    Past Medical History:     Allergies   Allergen Reactions    Amoxicillin Shortness Of Breath     Has tolerated Cefazolin, Ceftriaxone, and Cefepime -Jermaine Melida, [facility]    Ceclor [Cefaclor] Shortness Of Breath     Has tolerated Cefazolin, Ceftriaxone, and Cefepime -Jermaine Melida, [facility]      Penicillins Shortness Of Breath     Has tolerated Cefazolin, Ceftriaxone, and Cefepime -Jermaine Melida, [facility]    Sulfa Antibiotics Rash    Valium [Diazepam] Mental Status Change     DEPRESSED    Ambien [Zolpidem] Mental Status Change    Aspirin GI Intolerance    Ativan [Lorazepam] Mental Status Change    Benadryl [Diphenhydramine] Anxiety     AND MAKES HER HYPER    Biaxin [Clarithromycin] Unknown - Low Severity    Cephalexin Unknown - Low Severity    Clindamycin Unknown - Low Severity    Compazine [Prochlorperazine Edisylate] Rash    Contrast Dye (Echo Or Unknown Ct/Mr) Other (See Comments)     Caused pain in her arm    Doxycycline Rash    Nsaids GI Intolerance    Phenergan [Promethazine Hcl] GI Intolerance    Promethazine GI Intolerance    Vancomycin Itching     Past Medical History:   Diagnosis Date    Anesthesia     REPORTS HAS GOT REAL EMOTIONAL AND HAS BECOME ANGRY BEFORE    Anxiety     Aortic stenosis, severe     HAS BIO VALVE REPLACED    Arthritis     Asthma     Back pain     Blood clotting  disorder     Cancer     Cancer associated pain     HODGKINS LYMPHOMA    CHF (congestive heart failure)     Chronic low back pain with sciatica     Chronic pain disorder     COPD (chronic obstructive pulmonary disease)     Coronary artery disease     Diabetes mellitus     TYPE 2    Diabetes mellitus     Dyspnea on effort     GERD (gastroesophageal reflux disease)     H/O lumpectomy     H/O: hysterectomy     Hiatal hernia     History of degenerative disc disease     History of kidney stones     History of pulmonary embolus (PE)     History of transfusion     AS A CHILD NO TRANSFUSION REACTION    History of transfusion     Hodgkin's lymphoma     5/19/23  5 YEARS SINCE LAST CHEMO TX    Hypertension     Immobility     Liver disease     DENIES ANY CURRENT ISSUES    Lupus     Mitral valve prolapse     Nausea vomiting and diarrhea 3/7/2024    Panic disorder     Pelvic pain     Peripheral neuropathy     Personal history of DVT (deep vein thrombosis)     Presence of intrathecal pump     PTSD (post-traumatic stress disorder)     Sacroiliac joint disease     SI (sacroiliac) joint inflammation     Sleep apnea     Venous insufficiency      Past Surgical History:   Procedure Laterality Date    ABDOMINAL SURGERY      BACK SURGERY      SPINAL FUSION     BACK SURGERY      BLADDER REPAIR      CARDIAC CATHETERIZATION N/A 03/06/2019    Procedure: Valvuloplasty;  Surgeon: Wayne Johnson MD;  Location: [facility] INVASIVE LOCATION;  Service: Cardiology    CARDIAC CATHETERIZATION      CARDIAC CATHETERIZATION Left 5/31/2023    Procedure: Cardiac Catheterization/Vascular Study;  Surgeon: Poncho Reid MD;  Location: [facility] CATH INVASIVE LOCATION;  Service: Cardiovascular;  Laterality: Left;    CARDIAC SURGERY      CARDIAC VALVE REPLACEMENT  2021    CHOLECYSTECTOMY      COLONOSCOPY N/A 12/29/2021    Procedure: COLONOSCOPY;  Surgeon: Karine Orlando MD;  Location: [facility] ENDOSCOPY;  Service: Gastroenterology;   Laterality: N/A;  POOR PREP    COLONOSCOPY N/A 04/13/2022    Procedure: COLONOSCOPY WITH POLYPECTOMY;  Surgeon: Karine Orlando MD;  Location: [facility] ENDOSCOPY;  Service: Gastroenterology;  Laterality: N/A;  COLON POLYP, HEMORRHOIDS, POOR PREP    COLONOSCOPY      DIRECT LARYNGOSCOPY, ESOPHAGOSCOPY, BRONCHOSCOPY N/A 5/22/2023    Procedure: DIRECT LARYNGOSCOPY WITH BIOPSIES , ESOPHAGOSCOPY, BRONCHOSCOPY;  Surgeon: Ronnie Mcgarry MD;  Location: [facility] OR OSC;  Service: ENT;  Laterality: N/A;    ENDOSCOPY N/A 12/29/2021    Procedure: ESOPHAGOGASTRODUODENOSCOPY;  Surgeon: Karine Orlando MD;  Location: [facility] ENDOSCOPY;  Service: Gastroenterology;  Laterality: N/A;  ESOPHAGITIS, GASTRITIS, HIATAL HERNIA    ENDOSCOPY      ENDOSCOPY N/A 4/16/2024    Procedure: ESOPHAGOGASTRODUODENOSCOPY WITH BIOPSIES;  Surgeon: Karine Orlando MD;  Location: [facility] ENDOSCOPY;  Service: Gastroenterology;  Laterality: N/A;  ESOPHAGITIS, HIATAL HERNIA    HYSTERECTOMY      LYMPHADENECTOMY      PAIN PUMP INSERTION/REVISION N/A 10/18/2019    Procedure: PAIN PUMP INSERTION [facility] 10-18[facility]19 [place];  Surgeon: Horace Rios MD;  Location: [facility];  Service: Pain Management    PAIN PUMP INSERTION/REVISION N/A 11/23/2020    Procedure: pain pump removal;  Surgeon: Horace Rios MD;  Location: [facility];  Service: Pain Management;  Laterality: N/A;    PEG TUBE INSERTION N/A 7/26/2023    Procedure: PERCUTANEOUS ENDOSCOPIC GASTROSTOMY TUBE INSERTION;  Surgeon: Kody Mercer MD;  Location: [facility] ENDOSCOPY;  Service: General;  Laterality: N/A;  SUCCESSFUL PEG TUBE PLACE PLACEMENT    PORTACATH PLACEMENT      IN LEFT CHEST REPORTS THAT IT IS NOT FUNCTIONING    SKIN BIOPSY      TONSILLECTOMY      TUBAL ABDOMINAL LIGATION      TUMOR REMOVAL       Family History   Problem Relation Age of Onset    Heart failure Mother     Anxiety disorder Mother     Prostate cancer Father     Depression Sister     Bipolar  disorder Sister     Pancreatic cancer Sister     ADD / ADHD Brother     Thyroid cancer Maternal Grandmother     Pancreatic cancer Paternal Grandmother     ADD / ADHD Grandson     ADD / ADHD Granddaughter     ADD / ADHD Nephew     [place] Hyperthermia Neg Hx        Home Medications:  Prior to Admission medications    Medication Sig Start Date End Date Taking? Authorizing Provider   acetaminophen (Tylenol) 325 MG tablet Take 1 tablet by mouth Every 6 (Six) Hours As Needed for Mild Pain, Headache or Fever.    ProviderNette MD   albuterol (PROVENTIL) 2.5 MG/0.5ML nebulizer solution Take 2.5 mg by nebulization Every 4 (Four) Hours As Needed for Wheezing or Shortness of Air. 2/9/24   Virgil Navarro MD   albuterol sulfate  (90 Base) MCG/ACT inhaler Inhale 2 puffs Every 4 (Four) Hours As Needed for Wheezing. 11/20/23   Katia Wright MD   ALPRAZolam (Xanax) 0.5 MG tablet Take 1 tablet by mouth At Night As Needed for Anxiety for up to 30 days. 7/3/24 8/2/24  Katia Wright MD   azithromycin (ZITHROMAX) 250 MG tablet Take 1 tablet by mouth Daily. Indications: COPD Exacerbation Suppression, Pneumonia 6/14/24   Geraldo Gould,    bacitracin 500 UNIT/GM ointment Apply 1 Application topically to the appropriate area as directed 2 (Two) Times a Day. 4/10/24   Katia Wright MD   cetirizine (zyrTEC) 10 MG tablet Take 1 tablet by mouth Daily.  Patient not taking: Reported on 7/2/2024 10/31/23   Provider, MD Nette   clopidogrel (PLAVIX) 75 MG tablet Take 1 tablet by mouth Daily.  Patient not taking: Reported on 7/2/2024 2/21/24   Katia Wright MD   famotidine (Pepcid) 20 MG tablet Take 1 tablet by mouth 2 (Two) Times a Day for 90 days.  Patient not taking: Reported on 7/2/2024 5/23/24 8/21/24  Katia Wright MD   furosemide (Lasix) 40 MG tablet Take 1 tablet by mouth Daily. 7/3/24   Katia Wright MD   glucose blood test strip Use as instructed to check blood sugar daily Dx E11.9 7/15/24   Katia Wright MD   glucose  monitor monitoring kit Use as directed; check blood sugar once daily Dx E11.9 7/15/24   Katia Wright MD   Lancets 28G misc Use as directed; check blood sugar once daily  Dx E11.9 7/15/24   Katia Wright MD   megestrol (MEGACE) 20 MG tablet Take 1 tablet by mouth Daily.  Patient not taking: Reported on 7/2/2024 5/6/24   Provider, MD Nette   mupirocin (BACTROBAN) 2 % ointment Apply 1 Application topically to the appropriate area as directed 3 (Three) Times a Day. 4/10/24   Katia Wright MD   nicotine polacrilex (COMMIT) 2 MG lozenge Dissolve 1 lozenge in the mouth As Needed for Smoking Cessation.  Patient not taking: Reported on 7/2/2024 2/9/24   Virgil Navarro MD   O2 (OXYGEN) Inhale 1 (One) Time.    Provider, MD Nette   ondansetron ODT (ZOFRAN-ODT) 4 MG disintegrating tablet Place 1 tablet on the tongue Every 8 (Eight) Hours As Needed for Nausea or Vomiting. 5/30/24   Sebastian Gunn MD   pain patient supplied pump by Intrathecal route Continuous. Type of Medication: Hydromorphone 15 mg/ml and Bupivacaine 0.5 mg/ml  Provider:Dr. Boudreaux  Provider number: [phone]  Basal Dose: Hydromorphone 7.518 mg/day Bupivacaine 0.01973 mg/day  Pump capacity: 40ml  ( MAX of Hydromorphone 9.456 mg/ day; Bupivacaine 0.96104 mg /day)  Bolus Dose:Hydromorphone 0.500 mg, Bupivacaine 0.12896 mg  Max activations: 4 per day  Lockout 3 hours. 1 Bolus per every 3 hours   Next refill-  every 60 days 07/29/24   Alarm date- 08/06/24  Pump manufacture:Manny Rome MD   pantoprazole (PROTONIX) 20 MG EC tablet TAKE ONE TABLET BY MOUTH EVERY DAY 6/17/24   Sobeida Espinosa, APRN        Social History:   Social History     Tobacco Use    Smoking status: Every Day     Current packs/day: 1.50     Average packs/day: 1.5 packs/day for 45.6 years (68.4 ttl pk-yrs)     Types: Cigarettes     Start date: 1979     Passive exposure: Current    Smokeless tobacco: Never   Vaping Use    Vaping status: Never Used   Substance Use  Topics    Alcohol use: Never    Drug use: Never         Review of Systems:  Review of Systems   Constitutional:  Negative for chills and fever.   HENT:  Positive for sore throat. Negative for congestion and ear pain.    Eyes:  Negative for pain.   Respiratory:  Negative for cough, chest tightness and shortness of breath.    Cardiovascular:  Positive for leg swelling. Negative for chest pain.   Gastrointestinal:  Negative for abdominal pain, diarrhea, nausea and vomiting.   Genitourinary:  Negative for flank pain and hematuria.   Musculoskeletal:  Negative for joint swelling.   Skin:  Positive for wound. Negative for pallor.   Neurological:  Negative for seizures and headaches.   All other systems reviewed and are negative.       Physical Exam:  /87 (BP Location: Left arm, Patient Position: Sitting)   Pulse 92   Temp 98.1 °F (36.7 °C) (Oral)   Resp 20   LMP  (LMP Unknown)   SpO2 100%   Breastfeeding No         Physical Exam  Vitals and nursing note reviewed.   Constitutional:       General: She is not in acute distress.     Appearance: Normal appearance. She is not toxic-appearing.   HENT:      Head: Normocephalic and atraumatic.      Jaw: There is normal jaw occlusion.      Mouth/Throat:      Mouth: Mucous membranes are dry.      Comments: Adherent white plaque in the posterior oropharynx consistent with thrush  Eyes:      General: Lids are normal.      Extraocular Movements: Extraocular movements intact.      Conjunctiva/sclera: Conjunctivae normal.      Pupils: Pupils are equal, round, and reactive to light.   Neck:      Comments: Hoarse voice  Cardiovascular:      Rate and Rhythm: Normal rate and regular rhythm.      Pulses: Normal pulses.      Heart sounds: Normal heart sounds.   Pulmonary:      Effort: Pulmonary effort is normal. No respiratory distress.      Breath sounds: Normal breath sounds. No wheezing or rhonchi.   Abdominal:      General: Abdomen is flat. There is no distension.       Palpations: Abdomen is soft.      Tenderness: There is no abdominal tenderness. There is no guarding or rebound.   Musculoskeletal:         General: Normal range of motion.      Cervical back: Normal range of motion and neck supple.      Right lower leg: No edema.      Left lower leg: No edema.   Skin:     General: Skin is warm and dry.      Comments: Chronic venous stasis changes with weeping bilateral lower extremities   Neurological:      General: No focal deficit present.      Mental Status: She is alert and oriented to person, place, and time. Mental status is at baseline.   Psychiatric:         Mood and Affect: Mood normal.         Behavior: Behavior normal.                Procedures:  Procedures      Medical Decision Making:      Comorbidities that affect care:    Hodgkin's lymphoma, lupus, chronic pain, aortic stenosis, anxiety, diabetes, history of venous thromboembolism CHF, liver disease, asthma, COPD, coronary artery disease    External Notes reviewed:    Previous Clinic Note: Outpatient sleep medicine visit for suspected sleep apnea 7/19/2024      The following orders were placed and all results were independently analyzed by me:  Orders Placed This Encounter   Procedures    Urine Culture - Urine,    XR Chest 1 View    Comprehensive Metabolic Panel    [place] Draw    CBC Auto Differential    Urinalysis With Culture If Indicated - Urine, Clean Catch    Urinalysis, Microscopic Only - Urine, Clean Catch    BNP    Code Status and Medical Interventions: CPR (Attempt to Resuscitate); Full Support    Hospitalist (on-call MD unless specified)    Insert Peripheral IV    Initiate Observation Status    CBC & Differential    Green Top (Gel)    Lavender Top    Gold Top - SST    Light Blue Top       Medications Given in the Emergency Department:  Medications   sodium chloride 0.9 % flush 10 mL (has no administration in time range)   sodium chloride 0.9 % bolus 1,000 mL (0 mL Intravenous Stopped 7/30/24 0606)    HYDROmorphone (DILAUDID) injection 1 mg (1 mg Intravenous Given 7/30/24 0351)   ondansetron (ZOFRAN) injection 4 mg (4 mg Intravenous Given 7/30/24 0351)   nystatin (MYCOSTATIN) 100,000 unit/mL suspension 500,000 Units (500,000 Units Swish & Swallow Given 7/30/24 9469)   cefTRIAXone (ROCEPHIN) 1,000 mg in sodium chloride 0.9 % 100 mL IVPB-VTB (0 mg Intravenous Stopped 7/30/24 0555)        ED Course:    The patient was initially evaluated in the triage area where orders were placed. The patient was later dispositioned by Manuelito Lora MD.      The patient was advised to stay for completion of workup which includes but is not limited to communication of labs and radiological results, reassessment and plan. The patient was advised that leaving prior to disposition by a provider could result in critical findings that are not communicated to the patient.          Labs:    Lab Results (last 24 hours)       Procedure Component Value Units Date/Time    CBC & Differential [[id number]]  (Abnormal) Collected: 07/29/24 2255    Specimen: Blood from Arm, Right Updated: 07/29/24 2301    Narrative:      The following orders were created for panel order CBC & Differential.  Procedure                               Abnormality         Status                     ---------                               -----------         ------                     CBC Auto Differential[[id number]]        Abnormal            Final result                 Please view results for these tests on the individual orders.    Comprehensive Metabolic Panel [[id number]]  (Abnormal) Collected: 07/29/24 2255    Specimen: Blood from Arm, Right Updated: 07/29/24 2321     Glucose 168 mg/dL      BUN 13 mg/dL      Creatinine 0.45 mg/dL      Sodium 141 mmol/L      Potassium 3.9 mmol/L      Chloride 103 mmol/L      CO2 26.6 mmol/L      Calcium 8.9 mg/dL      Total Protein 6.8 g/dL      Albumin 4.0 g/dL      ALT (SGPT) 7 U/L      AST (SGOT) 14 U/L      Alkaline Phosphatase  57 U/L      Total Bilirubin <0.2 mg/dL      Globulin 2.8 gm/dL      A/G Ratio 1.4 g/dL      BUN/Creatinine Ratio 28.9     Anion Gap 11.4 mmol/L      eGFR 111.7 mL/min/1.73     Narrative:      GFR Normal >60  Chronic Kidney Disease <60  Kidney Failure <15      CBC Auto Differential [[id number]]  (Abnormal) Collected: 07/29/24 2255    Specimen: Blood from Arm, Right Updated: 07/29/24 2301     WBC 6.51 10*3/mm3      RBC 4.93 10*6/mm3      Hemoglobin 14.1 g/dL      Hematocrit 44.3 %      MCV 89.9 fL      MCH 28.6 pg      MCHC 31.8 g/dL      RDW 13.7 %      RDW-SD 45.3 fl      MPV 9.9 fL      Platelets 189 10*3/mm3      Neutrophil % 80.9 %      Lymphocyte % 10.9 %      Monocyte % 6.8 %      Eosinophil % 0.6 %      Basophil % 0.2 %      Immature Grans % 0.6 %      Neutrophils, Absolute 5.27 10*3/mm3      Lymphocytes, Absolute 0.71 10*3/mm3      Monocytes, Absolute 0.44 10*3/mm3      Eosinophils, Absolute 0.04 10*3/mm3      Basophils, Absolute 0.01 10*3/mm3      Immature Grans, Absolute 0.04 10*3/mm3      nRBC 0.0 /100 WBC     BNP [[id number]]  (Normal) Collected: 07/29/24 2255    Specimen: Blood from Arm, Right Updated: 07/30/24 0637     proBNP 220.0 pg/mL     Narrative:      This assay is used as an aid in the diagnosis of individuals suspected of having heart failure. It can be used as an aid in the diagnosis of acute decompensated heart failure (ADHF) in patients presenting with signs and symptoms of ADHF to the emergency department (ED). In addition, NT-proBNP of <300 pg/mL indicates ADHF is not likely.    Age Range Result Interpretation  NT-proBNP Concentration (pg/mL:      <50             Positive            >450                   Gray                 300-450                    Negative             <300    50-75           Positive            >900                  Gray                300-900                  Negative            <300      >75             Positive            >1800                  [place]                 300-1800                  Negative            <300    Urinalysis With Culture If Indicated - Urine, Clean Catch [[id number]]  (Abnormal) Collected: 07/30/24 0344    Specimen: Urine, Clean Catch Updated: 07/30/24 0358     Color, UA Yellow     Appearance, UA Turbid     pH, UA 7.0     Specific Gravity, UA 1.020     Glucose, UA Negative     Ketones, UA Negative     Bilirubin, UA Negative     Blood, UA Moderate (2+)     Protein, UA 30 mg/dL (1+)     Leuk Esterase, UA Large (3+)     Nitrite, UA Negative     Urobilinogen, UA 1.0 E.U./dL    Narrative:      In absence of clinical symptoms, the presence of pyuria, bacteria, and/or nitrites on the urinalysis result does not correlate with infection.    Urinalysis, Microscopic Only - Urine, Clean Catch [[id number]]  (Abnormal) Collected: 07/30/24 0344    Specimen: Urine, Clean Catch Updated: 07/30/24 0413     RBC, UA 0-2 /HPF      WBC, UA Too Numerous to Count /HPF      Bacteria, UA 3+ /HPF      Squamous Epithelial Cells, UA 13-20 /HPF      Hyaline Casts, UA None Seen /LPF      Amorphous Crystals, UA Moderate/2+ /HPF      Starch, UA Small/1+ /HPF      Methodology Manual Light Microscopy    Narrative:      Specimen not recommended for culture due to high epi count.    Urine Culture - Urine, Urine, Clean Catch [[id number]] Collected: 07/30/24 0344    Specimen: Urine, Clean Catch Updated: 07/30/24 0413             Imaging:    XR Chest 1 View    Result Date: 7/30/2024  XR CHEST 1 VW-  Date of exam: 7/30/2024, 3:28 A.M.  Indication: dyspnea  Comparison: 7/20/2024.  FINDINGS: A single AP (or PA) upright portable chest radiograph was performed. No cardiac enlargement is seen. No acute infiltrate is appreciated. Improved aeration of the lungs is suggested bilaterally since the prior exam. No pleural effusion or pneumothorax is identified. The patient has undergone transcatheter aortic valve replacement (TAVR), as before. Chronic calcified granulomatous disease involves the chest. The  thoracic aorta is atherosclerotic and ectatic. There is no significant change in the left IJ central venous line with its distal tip in the expected lower superior vena cava (SVC). Degenerative changes involve the shoulders. Surgical clips are projected over the left shoulder girdle, seen previously. No significant interval change is seen since the prior study (or studies).      No acute infiltrate is appreciated.    Please note that portions of this note were completed with a voice recognition program.   Electronically Signed By-Sony Hood MD On:7/30/2024 4:04 AM         Differential Diagnosis and Discussion:      Sore Throat: Differential diagnosis includes but is not limited to bacterial infection, viral infection, inhaled irritants, sinus drainage, thyroiditis, epiglottitis, and retropharyngeal abscess.    All labs were reviewed and interpreted by me.  All X-rays impressions were independently interpreted by me.  EKG was interpreted by me.    MDM               Patient Care Considerations:    SEPSIS was considered but is NOT present in the emergency department as SIRS criteria is not present.      Consultants/Shared Management Plan:    Hospitalist: I have discussed the case with the hospitalist who agrees to accept the patient for admission.    Social Determinants of Health:    Patient is independent, reliable, and has access to care.       Disposition and Care Coordination:    Admit:   Through independent evaluation of the patient's history, physical, and imperical data, the patient meets criteria for inpatient admission to the hospital.        Final diagnoses:   Acute UTI   Difficulty swallowing liquids   Thrush of mouth and esophagus        ED Disposition       ED Disposition   Decision to Admit    Condition   --    Comment   Level of Care: Med/Surg [1]   Diagnosis: UTI (urinary tract infection) [198623]                 This medical record created using voice recognition software.             Guido  MD Manuelito  07/30/24 0651

## 2024-07-30 NOTE — PROGRESS NOTES
As per the staff: Patient had left the unit earlier today, was found smoking inside the premises of the hospital (parking lot).  She was requested to stop smoking but refused and continued.  She was instructed that she is not allowed to leave the unit and might need to be readmitted if she does that again.  She had mentioned to the staff that she was anxious and had to step out to smoke and would not repeat it later.  She was started on as needed Xanax for anxiety.  Immediately after that incident and had returned back to her medical unit, she decided to leave AGAINST MEDICAL ADVICE.  Patient left AMA before my evaluation today.

## 2024-07-30 NOTE — NURSING NOTE
After aksing, Patient instructed may not leave the unit by Fatoumata Valle RN.   Few minutes later patient had left unit and wheeling across the parking lot in her w/c(seen by students)  and upon finding her, was smoking behind white van.  Asked her to stop smoking and refused and continued smoking.  Informed non smoking campus and that includes the parking lot and patients are not allowed to smoke on campus or leave the unit.  Informed security and dr. Abdi.

## 2024-07-31 ENCOUNTER — TELEPHONE (OUTPATIENT)
Dept: FAMILY MEDICINE CLINIC | Facility: CLINIC | Age: 59
End: 2024-07-31
Payer: COMMERCIAL

## 2024-07-31 LAB — BACTERIA SPEC AEROBE CULT: NORMAL

## 2024-07-31 RX ORDER — ALPRAZOLAM 0.5 MG
0.5 TABLET ORAL NIGHTLY PRN
Qty: 30 TABLET | Refills: 0 | Status: CANCELLED | OUTPATIENT
Start: 2024-07-31 | End: 2024-08-30

## 2024-07-31 NOTE — TELEPHONE ENCOUNTER
PT SCHEDULED HOSP F/U AND STATED THAT SHE LEFT AMA BUT WANTS TO ENSURE HER LABS ARE REVIEWED BY DR ESTEVES BEFORE HER APPT.

## 2024-08-01 ENCOUNTER — TELEPHONE (OUTPATIENT)
Dept: SPEECH THERAPY | Facility: HOSPITAL | Age: 59
End: 2024-08-01
Payer: COMMERCIAL

## 2024-08-01 DIAGNOSIS — F41.9 ANXIETY: ICD-10-CM

## 2024-08-01 DIAGNOSIS — R13.12 OROPHARYNGEAL DYSPHAGIA: Primary | ICD-10-CM

## 2024-08-01 RX ORDER — ALPRAZOLAM 0.5 MG/1
0.5 TABLET ORAL NIGHTLY PRN
Qty: 14 TABLET | Refills: 0 | Status: SHIPPED | OUTPATIENT
Start: 2024-08-01 | End: 2024-08-31

## 2024-08-01 NOTE — TELEPHONE ENCOUNTER
Patient was a no show for dysphagia therapy.  I called patient and she indicated she has diarrhea and cannot come to therapy today.  I reminded her to please try to call radiation oncology main number if she can't attend.      I reminded patient that her next appointment is next Thursday, August 8 at 10:00 am.      Isha Vu MS, CCC-SLP

## 2024-08-05 ENCOUNTER — TELEPHONE (OUTPATIENT)
Dept: RADIATION ONCOLOGY | Facility: HOSPITAL | Age: 59
End: 2024-08-05
Payer: COMMERCIAL

## 2024-08-05 NOTE — TELEPHONE ENCOUNTER
Diagnosis: History of head and neck cancer    Reason for Referral: Transportation    Content of Visit: OSW received a notification from Evi ROBERTSON MA in radiation oncology that Ms. Llanes missed her follow-up appointment today, reporting [facility] didn't wait for her upon their arrival to pick her up. OSW contacted [facility] and was advised that when the  arrived to her home, she was asleep outside in her pajamas. The  had another passenger in the vehicle and couldn't wait for her to get ready. Ms. Llanes has been rescheduled to see the radiation oncologist on 8/8/24 at 0930. OSW contacted [employer] to arrange her to arrive at 0915 - confirmation #[id number], return #[id number]. OSW support remains available.     Resources/Referrals Provided: [employer]

## 2024-08-07 ENCOUNTER — PATIENT OUTREACH (OUTPATIENT)
Dept: CASE MANAGEMENT | Facility: OTHER | Age: 59
End: 2024-08-07
Payer: COMMERCIAL

## 2024-08-07 ENCOUNTER — OFFICE VISIT (OUTPATIENT)
Dept: FAMILY MEDICINE CLINIC | Facility: CLINIC | Age: 59
End: 2024-08-07
Payer: COMMERCIAL

## 2024-08-07 VITALS
WEIGHT: 133 LBS | OXYGEN SATURATION: 92 % | BODY MASS INDEX: 22.71 KG/M2 | DIASTOLIC BLOOD PRESSURE: 74 MMHG | TEMPERATURE: 98.2 F | SYSTOLIC BLOOD PRESSURE: 124 MMHG | HEIGHT: 64 IN | HEART RATE: 73 BPM

## 2024-08-07 DIAGNOSIS — G89.4 CHRONIC PAIN SYNDROME: ICD-10-CM

## 2024-08-07 DIAGNOSIS — N30.10 INTERSTITIAL CYSTITIS: ICD-10-CM

## 2024-08-07 DIAGNOSIS — C32.9 LARYNGEAL CANCER: ICD-10-CM

## 2024-08-07 DIAGNOSIS — G89.3 CANCER ASSOCIATED PAIN: Primary | ICD-10-CM

## 2024-08-07 DIAGNOSIS — Z09 HOSPITAL DISCHARGE FOLLOW-UP: Primary | ICD-10-CM

## 2024-08-07 DIAGNOSIS — E11.9 TYPE 2 DIABETES MELLITUS WITHOUT COMPLICATION, UNSPECIFIED WHETHER LONG TERM INSULIN USE: ICD-10-CM

## 2024-08-07 DIAGNOSIS — F41.9 ANXIETY: ICD-10-CM

## 2024-08-07 DIAGNOSIS — R30.9 PAINFUL URINATION: ICD-10-CM

## 2024-08-07 DIAGNOSIS — G47.00 INSOMNIA, UNSPECIFIED TYPE: ICD-10-CM

## 2024-08-07 DIAGNOSIS — I50.9 CONGESTIVE HEART FAILURE, UNSPECIFIED HF CHRONICITY, UNSPECIFIED HEART FAILURE TYPE: ICD-10-CM

## 2024-08-07 DIAGNOSIS — M32.9 LUPUS: ICD-10-CM

## 2024-08-07 DIAGNOSIS — N39.0 RECURRENT UTI: ICD-10-CM

## 2024-08-07 LAB
BILIRUB BLD-MCNC: NEGATIVE MG/DL
CLARITY, POC: CLEAR
COLOR UR: YELLOW
EXPIRATION DATE: ABNORMAL
GLUCOSE UR STRIP-MCNC: NEGATIVE MG/DL
KETONES UR QL: NEGATIVE
LEUKOCYTE EST, POC: ABNORMAL
Lab: ABNORMAL
NITRITE UR-MCNC: NEGATIVE MG/ML
PH UR: 5.5 [PH] (ref 5–8)
PROT UR STRIP-MCNC: ABNORMAL MG/DL
RBC # UR STRIP: ABNORMAL /UL
SP GR UR: 1.03 (ref 1–1.03)
UROBILINOGEN UR QL: ABNORMAL

## 2024-08-07 PROCEDURE — 99214 OFFICE O/P EST MOD 30 MIN: CPT | Performed by: STUDENT IN AN ORGANIZED HEALTH CARE EDUCATION/TRAINING PROGRAM

## 2024-08-07 PROCEDURE — 1125F AMNT PAIN NOTED PAIN PRSNT: CPT | Performed by: STUDENT IN AN ORGANIZED HEALTH CARE EDUCATION/TRAINING PROGRAM

## 2024-08-07 PROCEDURE — 1160F RVW MEDS BY RX/DR IN RCRD: CPT | Performed by: STUDENT IN AN ORGANIZED HEALTH CARE EDUCATION/TRAINING PROGRAM

## 2024-08-07 PROCEDURE — 1159F MED LIST DOCD IN RCRD: CPT | Performed by: STUDENT IN AN ORGANIZED HEALTH CARE EDUCATION/TRAINING PROGRAM

## 2024-08-07 RX ORDER — FUROSEMIDE 40 MG/1
40 TABLET ORAL DAILY
Start: 2024-08-07

## 2024-08-07 RX ORDER — CIPROFLOXACIN 500 MG/1
1 TABLET, FILM COATED ORAL EVERY 12 HOURS SCHEDULED
COMMUNITY
Start: 2024-08-03

## 2024-08-07 RX ORDER — PREDNISONE 10 MG/1
10 TABLET ORAL DAILY
Qty: 30 TABLET | Refills: 2 | Status: SHIPPED | OUTPATIENT
Start: 2024-08-07 | End: 2024-11-05

## 2024-08-07 RX ORDER — ONDANSETRON 4 MG/1
4 TABLET, ORALLY DISINTEGRATING ORAL EVERY 8 HOURS PRN
Qty: 14 TABLET | Refills: 0 | Status: SHIPPED | OUTPATIENT
Start: 2024-08-07

## 2024-08-07 RX ORDER — ALPRAZOLAM 0.5 MG/1
0.5 TABLET ORAL NIGHTLY PRN
Qty: 16 TABLET | Refills: 0 | Status: SHIPPED | OUTPATIENT
Start: 2024-08-07 | End: 2024-08-23

## 2024-08-07 RX ORDER — CLOPIDOGREL BISULFATE 75 MG/1
75 TABLET ORAL DAILY
Qty: 90 TABLET | Refills: 2 | Status: SHIPPED | OUTPATIENT
Start: 2024-08-07

## 2024-08-07 NOTE — PROGRESS NOTES
"Chief Complaint  Hospital Follow Up Visit    Subjective      History of Present Illness    Isha Llanes is a 58 y.o. 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  She states she needs refills on medications.         Objective   Vital Signs:   Vitals:    08/07/24 0928   BP: 124/74   BP Location: Left arm   Patient Position: Sitting   Cuff Size: Adult   Pulse: 73   Temp: 98.2 °F (36.8 °C)   TempSrc: Temporal   SpO2: 92%   Weight: 60.3 kg (133 lb)   Height: 162.6 cm (64\")     Body mass index is 22.83 kg/m².    Wt Readings from Last 3 Encounters:   08/07/24 60.3 kg (133 lb)   07/20/24 60.5 kg (133 lb 6.1 oz)   07/02/24 60.5 kg (133 lb 6.1 oz)     BP Readings from Last 3 Encounters:   08/07/24 124/74   07/30/24 126/75   07/20/24 114/89   XR Chest 1 View    Result Date: 7/30/2024  No acute infiltrate is appreciated.    Please note that portions of this note were completed with a voice recognition program.   Electronically Signed By-Sony Hood MD On:7/30/2024 4:04 AM      CT Chest Without Contrast Diagnostic    Result Date: 7/20/2024  Impression: 1. Patchy areas of groundglass opacity in the lung fields likely infectious or inflammatory process. Recommend a follow-up examination after treatment to confirm resolution. 2. Emphysema. Electronically Signed: José Cloud MD  7/20/2024 10:56 AM EDT  Workstation ID: [id number]    CT Soft Tissue Neck Without Contrast    Result " Date: 7/20/2024  Impression: Examination is limited due to lack of IV contrast administration. Nonspecific prominence of the vocal folds and aryepiglottic folds, possibly related to post therapeutic change or underlying mass. Please correlate clinically to exclude laryngitis/laryngeal edema. Direct visualization may be beneficial. Further evaluation with contrast-enhanced CT may also be considered. Sclerosis within the C5-C7 vertebral bodies is similar since 10/25/2023. Electronically Signed: Jevon Baez MD  7/20/2024 10:40 AM EDT  Workstation ID: [id number]    XR Chest 1 View    Result Date: 7/20/2024  No active disease. Electronically Signed: José Cloud MD  7/20/2024 8:12 AM EDT  Workstation ID: [id number]    CT Chest Without Contrast Diagnostic    Result Date: 6/13/2024  1. Exam is degraded by motion and streak artifact from the patient's arms. 2. Tree-in-bud nodular opacities in the right middle lobe are likely infectious or inflammatory. Attention on 3-month follow-up chest CT is recommended. 3. Pulmonary emphysema. 4. Additional nonacute findings as above Electronically Signed: Des Carlson MD  6/13/2024 1:43 AM EDT  Workstation ID: [id number]    CT Head Without Contrast    Result Date: 6/13/2024  Motion-degraded exam. No definite acute findings. Electronically Signed: Des Carlson MD  6/13/2024 1:34 AM EDT  Workstation ID: [id number]    XR Chest 1 View    Result Date: 6/12/2024  No acute cardiopulmonary findings. Electronically Signed: Des Carlson MD  6/12/2024 10:57 PM EDT  Workstation ID: [id number]    NM PET/CT Skull Base to Mid Thigh    Result Date: 5/7/2024   1. Relatively low-grade increased activity in the left supraglottic region which may be on the basis of posttreatment changes as described. 2. No definite evidence of metastatic disease. 3. Incidental findings as noted above.    Electronically Signed By-Jose G Hidalgo MD On:5/7/2024 11:49 AM      XR Chest 1 View    Result Date: 4/27/2024  No  acute cardiopulmonary process identified.   Electronically Signed By-Ryan Farnsworth MD On:4/27/2024 11:32 AM        Health Maintenance   Topic Date Due    DIABETIC EYE EXAM  Never done    LIPID PANEL  01/12/2024    HEMOGLOBIN A1C  05/29/2024    ZOSTER VACCINE (1 of 2) 08/07/2024 (Originally 12/4/1984)    COVID-19 Vaccine (1) 08/09/2024 (Originally 12/4/1970)    DIABETIC FOOT EXAM  08/29/2024 (Originally 3/29/2017)    Hepatitis B (1 of 3 - 19+ 3-dose series) 02/19/2025 (Originally 12/4/1984)    Pneumococcal Vaccine 0-64 (1 of 2 - PCV) 02/19/2025 (Originally 12/4/1971)    MAMMOGRAM  02/19/2025 (Originally 1/8/2021)    TDAP/TD VACCINES (1 - Tdap) 02/19/2025 (Originally 12/4/1984)    INFLUENZA VACCINE  03/31/2025 (Originally 8/1/2024)    URINE MICROALBUMIN  02/19/2025    ANNUAL PHYSICAL  05/23/2025    LUNG CANCER SCREENING  07/20/2025    COLORECTAL CANCER SCREENING  04/13/2032    HEPATITIS C SCREENING  Completed    PAP SMEAR  Discontinued       Physical Exam  Constitutional:       Appearance: She is ill-appearing.   Skin:     General: Skin is warm.      Comments: In wheel chair   Neurological:      Mental Status: She is alert.          Result Review :     The following data was reviewed by: Katia Wright MD on 08/07/2024:      Procedures          Diagnoses and all orders for this visit:    1. Hospital discharge follow-up (Primary)    2. Painful urination  -     POCT urinalysis dipstick, automated    3. Chronic pain syndrome    4. Recurrent UTI    5. Anxiety  -     ALPRAZolam (Xanax) 0.5 MG tablet; Take 1 tablet by mouth At Night As Needed for Anxiety for up to 16 days.  Dispense: 16 tablet; Refill: 0    6. Interstitial cystitis  -     Ambulatory Referral to Urology    7. Insomnia, unspecified type    8. Laryngeal cancer    9. Congestive heart failure, unspecified HF chronicity, unspecified heart failure type  -     furosemide (Lasix) 40 MG tablet; Take 1 tablet by mouth Daily.  -     clopidogrel (PLAVIX) 75 MG  tablet; Take 1 tablet by mouth Daily.  Dispense: 90 tablet; Refill: 2    10. Lupus  -     Ambulatory Referral to Rheumatology  -     predniSONE (DELTASONE) 10 MG tablet; Take 1 tablet by mouth Daily for 90 days.  Dispense: 30 tablet; Refill: 2    Other orders  -     ondansetron ODT (ZOFRAN-ODT) 4 MG disintegrating tablet; Place 1 tablet on the tongue Every 8 (Eight) Hours As Needed for Nausea or Vomiting.  Dispense: 14 tablet; Refill: 0       Risk of tendon rupture discussed with Pt, she wants to proceed with her daily prednisone.    BMI is within normal parameters. No other follow-up for BMI required.     Discontinue gabapentin today,     FOLLOW UP  Return in about 3 months (around 11/7/2024).  Patient was given instructions and counseling regarding her condition or for health maintenance advice. Please see specific information pulled into the AVS if appropriate.       Katia Wright MD  08/07/24  10:22 EDT    CURRENT & DISCONTINUED MEDICATIONS  Current Outpatient Medications   Medication Instructions    acetaminophen (TYLENOL) 325 mg, Oral, Every 6 Hours PRN    albuterol (PROVENTIL) 2.5 mg, Nebulization, Every 4 Hours PRN    albuterol sulfate  (90 Base) MCG/ACT inhaler 2 puffs, Inhalation, Every 4 Hours PRN    ALPRAZolam (XANAX) 0.5 mg, Oral, Nightly PRN    ciprofloxacin (CIPRO) 500 MG tablet 1 tablet, Oral, Every 12 Hours Scheduled    clopidogrel (PLAVIX) 75 mg, Oral, Daily    furosemide (LASIX) 40 mg, Oral, Daily    mupirocin (BACTROBAN) 2 % ointment 1 Application, Topical, 3 Times Daily    nicotine (NICODERM CQ) 21 MG/24HR patch 1 patch, Transdermal, Every 24 Hours    O2 (OXYGEN) Inhalation, Once    ondansetron ODT (ZOFRAN-ODT) 4 mg, Translingual, Every 8 Hours PRN    pain patient supplied pump Intrathecal, Continuous, Type of Medication: Hydromorphone 15 mg/ml and Bupivacaine 0.5 mg/ml<BR>Provider:Dr. Boudreaux<BR>Provider number: [phone]<BR>Basal Dose: Hydromorphone 7.518 mg/day Bupivacaine 0.71613  mg/day<BR>Pump capacity: 40ml<BR>( MAX of Hydromorphone 9.456 mg/ day; Bupivacaine 0.03133 mg /day)<BR>Bolus Dose:Hydromorphone 0.500 mg, Bupivacaine 0.09651 mg<BR>Max activations: 4 per day<BR>Lockout 3 hours. 1 Bolus per every 3 hours <BR>Next refill-  every 60 days 09/20/24 <BR>Alarm date- 09/27/24<BR>Pump manufacture:[employer]    predniSONE (DELTASONE) 10 mg, Oral, Daily       Medications Discontinued During This Encounter   Medication Reason    ALPRAZolam (Xanax) 0.5 MG tablet Reorder    furosemide (Lasix) 40 MG tablet Reorder    clopidogrel (PLAVIX) 75 MG tablet Reorder    ondansetron ODT (ZOFRAN-ODT) 4 MG disintegrating tablet Reorder    predniSONE (DELTASONE) 10 MG tablet Reorder    gabapentin (NEURONTIN) 300 MG capsule

## 2024-08-07 NOTE — OUTREACH NOTE
AMBULATORY CASE MANAGEMENT NOTE    Names and Relationships of Patient/Support Persons: Contact: Isha Llanes; Relationship: Self -     CCM Interim Update  Met with patient in office, discussed CCM program, length of time and RN role. Patient consented to program with this RN    Recent ER visit to hospital, was admitted for 8 hours and left AMA. Patient noted with UTI. Started on Cipro on 8.3 Patient currently living with daughter in home. Complex medical history including open wounds, smoker, unsteady gait and weakness. Patient continued to fall asleep and slump over in chair while RN met with her in office. Patient stated she has been very tired lately. Noted with open wounds on both of her back calves, may need HH for wound care as well. Patient reports she has Lupes and has chronic joint pain and arthritis.     Patient stated she needs a mechanical lift recliner, as she can't sleep in a bed. She is also requesting a massage therapist, crutches with arm supports and a rolling walker with a seat. She stated she's had multiple HH companies in the past, but has not been successful with their therapies. She stated her goal is to walk with crutches and thinks the arms on crutches will help. Patient may need to explore OP therapy services. She has transportation through [employer].     Patient agreeable to follow up call next week with this RN.     Care Coordination  RN sent referral to Monica ESCOBAR for additional assistance. Referral sent to [facility]. PT/OT. Waiting for response.           Adult Patient Profile  Questions/Answers      Flowsheet Row Most Recent Value   Symptoms/Conditions Managed at Home skin, diabetes, type 2, chronic pain, musculoskeletal   Barriers to Managing Health stress of chronic illness, understanding health advice, lack of housing   Diabetes Management Strategies coping strategies, diet modification   Diabetes Self-Management Outcome 3 (uncertain)   Musculoskeletal  Symptoms/Conditions joint pain, unsteady gait, osteoarthritis, back pain   Musculoskeletal Management Strategies activity, coping strategies, exercise, medical device, medication therapy   Musculoskeletal Self-Management Outcome 2 (bad)   Chronic Pain Location back and legs   Chronic Pain Quality aching, sharp, throbbing, tightness   Chronic Pain Associated Signs/Symptoms anxiety, sleep disturbance, irritability, weakness   Chronic Pain Aggravating Factors movement   Chronic Pain Management Strategies medication therapy, medical device, positioning   Chronic Pain Self-Management Outcome 2 (bad)   Skin Symptoms/Conditions wound   Skin Self-Management Outcome 1 (very bad)   Skin Comment multiple openings on bilateral lower extremities   Q1: How often do you have a drink containing alcohol? Never   Q2: How many drinks containing alcohol do you have on a typical day when you are drinking? None   Q3: How often do you have six or more drinks on one occasion? Never   Audit-C Score 0        Send Education  Questions/Answers      Flowsheet Row Most Recent Value   Advanced Directives: Not Interested At This Time        SDOH updated and reviewed with the patient during this program:  --     Alcohol Use: Not At Risk (8/7/2024)    AUDIT-C     Frequency of Alcohol Consumption: Never     Average Number of Drinks: Patient does not drink     Frequency of Binge Drinking: Never      --     Depression: At risk (7/2/2024)    PHQ-2     PHQ-2 Score: 5      --     Disabilities: At Risk (7/30/2024)    Disabilities     Concentrating, Remembering, or Making Decisions Difficulty: yes     Doing Errands Independently Difficulty: patient declined      --     Employment: Not At Risk (6/13/2024)    Employment     Do you want help finding or keeping work or a job?: I do not need or want help      Financial Resource Strain: Low Risk  (8/7/2024)    Overall Financial Resource Strain (CARDIA)     Difficulty of Paying Living Expenses: Not very hard       --     Food Insecurity: No Food Insecurity (8/7/2024)    Hunger Vital Sign     Worried About Running Out of Food in the Last Year: Never true     Ran Out of Food in the Last Year: Never true      --     Health Literacy: Not At Risk (6/13/2024)    Education     Help with school or training?: No     Preferred Language: English      --     Housing Stability: High Risk (8/7/2024)    Housing Stability Vital Sign     Unable to Pay for Housing in the Last Year: Yes     Number of Times Moved in the Last Year: 1     Homeless in the Last Year: No      --     Interpersonal Safety: Unknown (8/7/2024)    Humiliation, Afraid, Rape, and Kick questionnaire     Fear of Current or Ex-Partner: No     Emotionally Abused: No     Physically Abused: No      --     Physical Activity: Inactive (8/7/2024)    Exercise Vital Sign     Days of Exercise per Week: 0 days     Minutes of Exercise per Session: 0 min      --     Social Connections: Socially Isolated (8/7/2024)    Social Connection and Isolation Panel [NHANES]     Frequency of Communication with Friends and Family: Once a week     Frequency of Social Gatherings with Friends and Family: Once a week     Attends Restorationism Services: Never     Active Member of Clubs or Organizations: Yes     Attends Club or Organization Meetings: Never     Marital Status:       --     Stress: Stress Concern Present (8/7/2024)    [employer] - Occupational Stress Questionnaire     Feeling of Stress : To some extent      --     Tobacco Use: High Risk (8/7/2024)    Patient History     Smoking Tobacco Use: Every Day     Smokeless Tobacco Use: Never     Passive Exposure: Current      --     Transportation Needs: No Transportation Needs (8/7/2024)    PRAPARE - Transportation     Lack of Transportation (Medical): No     Lack of Transportation (Non-Medical): No      --     Utilities: Not At Risk (8/7/2024)    [facility] Utilities     Threatened with loss of utilities: No        Education Documentation  Unresolved/Worsening Symptoms, taught by Alejandrina Edmonds RN at 8/7/2024 11:23 AM.  Learner: Patient  Readiness: Eager  Method: Explanation  Response: Verbalizes Understanding    Wound Care, taught by Alejandrina Edmonds RN at 8/7/2024 11:23 AM.  Learner: Patient  Readiness: Eager  Method: Explanation  Response: Verbalizes Understanding    Provider Follow-Up, taught by Alejandrina Edmonds RN at 8/7/2024 11:23 AM.  Learner: Patient  Readiness: Eager  Method: Explanation  Response: Verbalizes Understanding    Prevent Skin Breakdown, taught by Alejandrina Edmonds RN at 8/7/2024 11:23 AM.  Learner: Patient  Readiness: Eager  Method: Explanation  Response: Verbalizes Understanding    Medication Management, taught by Alejandrina Edmonds RN at 8/7/2024 11:23 AM.  Learner: Patient  Readiness: Eager  Method: Explanation  Response: Verbalizes Understanding    Fluid/Food Intake, taught by Alejandrina Edmonds RN at 8/7/2024 11:23 AM.  Learner: Patient  Readiness: Eager  Method: Explanation  Response: Verbalizes Understanding    Activity, taught by Alejandrina Edmonds RN at 8/7/2024 11:23 AM.  Learner: Patient  Readiness: Eager  Method: Explanation  Response: Verbalizes Understanding    Signs/Symptoms, taught by Alejandrina Edmonds RN at 8/7/2024 11:23 AM.  Learner: Patient  Readiness: Eager  Method: Explanation  Response: Verbalizes Understanding    Description, taught by Alejandrina Edmonds RN at 8/7/2024 11:23 AM.  Learner: Patient  Readiness: Eager  Method: Explanation  Response: Verbalizes Understanding    Smoking Cessation, taught by Alejandrina Edmonds RN at 8/7/2024 11:23 AM.  Learner: Patient  Readiness: Eager  Method: Explanation  Response: Verbalizes Understanding    Quitting Smoking, taught by Alejandrina Edmonds RN at 8/7/2024 11:23 AM.  Learner: Patient  Readiness: Eager  Method: Explanation  Response: Verbalizes Understanding    How to [place] with Quitting Smoking, taught by  Alejandrina Edmonds, RN at 8/7/2024 11:23 AM.  Learner: Patient  Readiness: Eager  Method: Explanation  Response: Verbalizes Understanding    Repetition, taught by Alejandrina Edmonds RN at 8/7/2024 11:23 AM.  Learner: Patient  Readiness: Eager  Method: Explanation  Response: Verbalizes Understanding    Managing Roadblocks, taught by Alejandrina Edmonds RN at 8/7/2024 11:23 AM.  Learner: Patient  Readiness: Eager  Method: Explanation  Response: Verbalizes Understanding    Treatment Options, taught by Alejandrina Edmonds RN at 8/7/2024 11:23 AM.  Learner: Patient  Readiness: Eager  Method: Explanation  Response: Verbalizes Understanding    Tobacco/Smoking Risks, taught by Alejandrina Edmonds RN at 8/7/2024 11:23 AM.  Learner: Patient  Readiness: Eager  Method: Explanation  Response: Verbalizes Understanding    Rewards of Quitting, taught by Alejandrina Edmonds RN at 8/7/2024 11:23 AM.  Learner: Patient  Readiness: Eager  Method: Explanation  Response: Verbalizes Understanding    Relevance, taught by Alejandrina Edmonds RN at 8/7/2024 11:23 AM.  Learner: Patient  Readiness: Eager  Method: Explanation  Response: Verbalizes Understanding    Unresolved/Worsening Symptoms, taught by Alejandrina Edmonds RN at 8/7/2024 11:23 AM.  Learner: Patient  Readiness: Eager  Method: Explanation  Response: Verbalizes Understanding    Sleep/Rest, taught by Alejandrina Edmonds RN at 8/7/2024 11:23 AM.  Learner: Patient  Readiness: Eager  Method: Explanation  Response: Verbalizes Understanding    Skin Injury Prevention Measures, taught by Alejandrina Edmonds RN at 8/7/2024 11:23 AM.  Learner: Patient  Readiness: Eager  Method: Explanation  Response: Verbalizes Understanding    Fluid/Food Intake, taught by Alejandrina Edmonds RN at 8/7/2024 11:23 AM.  Learner: Patient  Readiness: Eager  Method: Explanation  Response: Verbalizes Understanding    Basic Skin Care, taught by Alejandrina Edmonds RN at 8/7/2024 11:23 AM.  Learner:  Patient  Readiness: Eager  Method: Explanation  Response: Verbalizes Understanding    Signs/Symptoms, taught by Alejandrina Edmonds RN at 8/7/2024 11:23 AM.  Learner: Patient  Readiness: Eager  Method: Explanation  Response: Verbalizes Understanding    Risk Factors, taught by Alejandrina Edmonds RN at 8/7/2024 11:23 AM.  Learner: Patient  Readiness: Eager  Method: Explanation  Response: Verbalizes Understanding    Unresolved/Worsening Symptoms, taught by Alejandrina Edmonds RN at 8/7/2024 11:23 AM.  Learner: Patient  Readiness: Eager  Method: Explanation  Response: Verbalizes Understanding    Safety, taught by Alejandrina Edmonds RN at 8/7/2024 11:23 AM.  Learner: Patient  Readiness: Eager  Method: Explanation  Response: Verbalizes Understanding    Medication Management, taught by Alejandrina Edmonds RN at 8/7/2024 11:23 AM.  Learner: Patient  Readiness: Eager  Method: Explanation  Response: Verbalizes Understanding    Home Safety, taught by Alejandrina Edmonds RN at 8/7/2024 11:23 AM.  Learner: Patient  Readiness: Eager  Method: Explanation  Response: Verbalizes Understanding    Energy Conservation, taught by Alejandrina Edmonds RN at 8/7/2024 11:23 AM.  Learner: Patient  Readiness: Eager  Method: Explanation  Response: Verbalizes Understanding    Risk Factors, taught by Alejandrina Edmonds RN at 8/7/2024 11:23 AM.  Learner: Patient  Readiness: Eager  Method: Explanation  Response: Verbalizes Understanding          Alejandrina BARBA  Ambulatory Case Management    8/7/2024, 11:25 EDT    Education Documentation  No documentation found.        Alejandrina BARBA  Ambulatory Case Management    8/7/2024, 10:43 EDT

## 2024-08-08 ENCOUNTER — DOCUMENTATION (OUTPATIENT)
Dept: RADIATION ONCOLOGY | Facility: HOSPITAL | Age: 59
End: 2024-08-08
Payer: COMMERCIAL

## 2024-08-08 ENCOUNTER — HOSPITAL ENCOUNTER (OUTPATIENT)
Dept: RADIATION ONCOLOGY | Facility: HOSPITAL | Age: 59
Setting detail: RADIATION/ONCOLOGY SERIES
Discharge: HOME | End: 2024-08-08
Payer: COMMERCIAL

## 2024-08-08 DIAGNOSIS — R13.12 OROPHARYNGEAL DYSPHAGIA: Primary | ICD-10-CM

## 2024-08-08 PROCEDURE — 92526 ORAL FUNCTION THERAPY: CPT | Performed by: SPEECH-LANGUAGE PATHOLOGIST

## 2024-08-08 NOTE — THERAPY TREATMENT NOTE
Outpatient Adult Speech Language Therapy           Treatment Note    Patient: Isha Llanes                                                                                     Visit Date: 2024  :     1965    Referring practitioner:    No Known Provider  Date of Initial Visit:          24    Patient seen for 2 sessions    Visit Diagnoses:    ICD-10-CM ICD-9-CM   1. Oropharyngeal dysphagia  R13.12 787.22     SUBJECTIVE   Patient presents to speech language pathology outpatient today secondary to dysphagia.  She is receiving swallow treatment to increase safety of swallow by increasing laryngeal elevation to promote epiglottic inversion, strengthening of the tongue base and pharyngeal muscles to decrease residue and promote clearing of liquids and solids and increase strength and coordination of the swallow to promote a timely swallow.  This will aid in reducing risk of aspiration that may lead to aspiration pneumonia.      Patient is demonstrating increased sleepiness and will fall asleep in her electric wheel chair. Therapist continued to cue patient to sit up in chair and follow the directions.       Education:  POC, HEP, signs and symptoms of aspiration, diet    OBJECTIVE   GOALS      GOALS ACTIVITY PERFORMED TRIALS COMPLETED LEVEL OF CUEING REQUIRED   LTG 1: 12 weeks:  The patient will demonstrate 20-39% swallow limitation by achieving a score of  5/7 on the Functional Communication Measures for swallowing to increase safety of swallow to highest levels of functioning using compensatory strategies to reduce risk of aspiration.          STG 1a: 12 weeks Patient will accept 80% trials of thin liquids on  2 consecutive trials with min to no clinical signs and symptoms of aspiration to facilitate a safe swallow Thin liquids 2/4 with cough during the swallow Max assist   STG 1b:  12 weeks: Patient will accept 80% trials of easy  to masticate solids on two consecutive trials of different bolus' with   minimal clinical signs and symptoms of residual or aspiration after the swallow  to facilitate a safe swallow.      STG 1c: 12 weeks: Patient will complete a snack and/or meal  using compensatory strategies with min assist and minimal clinical signs and symptoms of aspiration  to  facilitate a safe swallow.      STG 1d: 12 weeks:  Patient will be educated on signs and symptoms of aspiration and diet with patient verbalizing understanding with moderate cueing.   education Diet  HEP  Signs and symptoms of aspiration  POC Max assist   STG 2a: 12 weeks:  Patient will complete pharyngeal isometrics with min to mod assist to strengthen pharyngeal wall decreasing risk of residual after the swallow.       STG 2b: 12 weeks:  Patient will complete tongue base isometrics to increase tongue base strength to a 4/5 reducing  residual after the swallow, and increasing timeliness of swallow.  Yawn 1 set of 10 repetitions and 1 set of 5 repetitions with patient not able to finish full set Max assist to demonstrate with Patient returning demonstration.  Patient given teaching handout of the exercise.    STG2c: 12 weeks:Patient will complete coordination exercises with min to mod assist to  reduce premature spillage/timely eppiglottic closure prior, during and/or after  the swallow decreasing the risk of aspiration Effortful swallows 8/ 10 stimulating the swallow with a spoon.  Max assist to stimulate swallow secondary to patient demonstrate decreased timing.    STG 2d: 12 weeks:  Patient will be given a home exercise program and demonstrate understanding of the program with min to mod assist. HEP 3 sets of 10 repetitions; 3-5 times per day:    Yawn  Effortful swallow  Chin tuck against resistance    STG 2e: 12 weeks:  Patient will complete laryngeal elevation exercises with min assist to increase laryngeal elevation, eppiglottic inversion and coordination  reducing risk of aspiration. Chin tuck against reisitance 2 sets of 10 repetitions Max assist to demonstrate with Patient returning demonstration.  Patient given teaching handout of the exercise.    STG 2f: 12 Patient will complete Respiratory Muscle Training to include Expiratory Muscle Strength Training for 8 weeks to increase laryngeal elevation and coordination.                        ASSESSMENT/PLAN     ASSESSMENT: Patient requires the skills of a therapist to provide maximum assist to increase strength and timeliness of swallow increasing safety of swallow and decrease risk of aspiration that may lead to pneumonia.    Progress:   PLAN: continue plan of care    Progress Note Due Date:8/18/24  Recertification Due Date:10/18/24    SIGNATURE: SAM Chaves, [place] License #: [id number]  Electronically Signed on 8/8/2024            Adult Outpatient Rehabiliation                                             [address] [place] [address]. [address]  [phone] Office  [phone] Fax

## 2024-08-08 NOTE — PROGRESS NOTES
Diagnosis: History of head and neck cancer    Reason for Referral: Housing    Content of Visit: OSW received a notification from Evi ROBERTSON MA in radiation oncology, advising that Ms. Llanes is requesting a list of low income housing. OSW is mailing her the list of affording rental housing in [facility]. OSW support remains available.    Resources/Referrals Provided: Affordable Rental Housing in [facility]

## 2024-08-09 ENCOUNTER — PATIENT OUTREACH (OUTPATIENT)
Age: 59
End: 2024-08-09
Payer: COMMERCIAL

## 2024-08-09 DIAGNOSIS — C32.1 MALIGNANT NEOPLASM OF SUPRAGLOTTIS: ICD-10-CM

## 2024-08-09 DIAGNOSIS — R13.12 OROPHARYNGEAL DYSPHAGIA: Primary | ICD-10-CM

## 2024-08-09 NOTE — OUTREACH NOTE
Patient Outreach    MSW completed chart review. Patient working with OSW and has been provided housing and daughter is working with [employer] and has completed waiver/in home supports application. Patient also has established with [facility]. MSW available should any future needs arise.    Monica RUIZ -   Ambulatory Case Management    8/9/2024, 13:16 EDT

## 2024-08-14 ENCOUNTER — TELEPHONE (OUTPATIENT)
Dept: CASE MANAGEMENT | Facility: OTHER | Age: 59
End: 2024-08-14
Payer: COMMERCIAL

## 2024-08-14 DIAGNOSIS — E11.9 TYPE 2 DIABETES MELLITUS WITHOUT COMPLICATION, UNSPECIFIED WHETHER LONG TERM INSULIN USE: ICD-10-CM

## 2024-08-14 DIAGNOSIS — G89.3 CANCER ASSOCIATED PAIN: Primary | ICD-10-CM

## 2024-08-14 DIAGNOSIS — G89.4 CHRONIC PAIN SYNDROME: ICD-10-CM

## 2024-08-14 NOTE — TELEPHONE ENCOUNTER
Called to discuss current status and continued needs, follow up on post MSW call. No answer, unable to leave message.     Will attempt again.     Alejandrina HARRIS RN  Ambulatory

## 2024-08-15 ENCOUNTER — HOSPITAL ENCOUNTER (OUTPATIENT)
Dept: RADIATION ONCOLOGY | Facility: HOSPITAL | Age: 59
Setting detail: RADIATION/ONCOLOGY SERIES
Discharge: HOME | End: 2024-08-15
Payer: COMMERCIAL

## 2024-08-15 ENCOUNTER — TELEPHONE (OUTPATIENT)
Dept: ONCOLOGY | Facility: HOSPITAL | Age: 59
End: 2024-08-15
Payer: COMMERCIAL

## 2024-08-15 DIAGNOSIS — R13.12 OROPHARYNGEAL DYSPHAGIA: Primary | ICD-10-CM

## 2024-08-15 PROCEDURE — 92526 ORAL FUNCTION THERAPY: CPT | Performed by: SPEECH-LANGUAGE PATHOLOGIST

## 2024-08-15 NOTE — THERAPY TREATMENT NOTE
Treatment Note    Patient: Isha Llanes                                                                                     Visit Date: 8/15/2024  :     1965    Referring practitioner:    No Known Provider  Date of Initial Visit:          24    Patient seen for 2 sessions    Visit Diagnoses:    ICD-10-CM ICD-9-CM   1. Oropharyngeal dysphagia  R13.12 787.22     SUBJECTIVE   Patient presents to speech language pathology outpatient today secondary to dysphagia.  She is receiving swallow treatment to increase safety of swallow by increasing laryngeal elevation to promote epiglottic inversion, strengthening of the tongue base and pharyngeal muscles to decrease residue and promote clearing of liquids and solids and increase strength and coordination of the swallow to promote a timely swallow.  This will aid in reducing risk of aspiration that may lead to aspiration pneumonia.      Patient indicates that she is refluxing noodles into there nasal cavity.  She indicates this happens often several hours after she eats.       Education:  POC, HEP, signs and symptoms of aspiration, diet    OBJECTIVE   GOALS      GOALS ACTIVITY PERFORMED TRIALS COMPLETED LEVEL OF CUEING REQUIRED   LTG 1: 12 weeks:  The patient will demonstrate 20-39% swallow limitation by achieving a score of  5/7 on the Functional Communication Measures for swallowing to increase safety of swallow to highest levels of functioning using compensatory strategies to reduce risk of aspiration.          STG 1a: 12 weeks Patient will accept 80% trials of thin liquids on  2 consecutive trials with min to no clinical signs and symptoms of aspiration to facilitate a safe swallow      STG 1b:  12 weeks: Patient will accept 80% trials of easy to masticate solids on two consecutive trials of different bolus' with   minimal clinical signs and symptoms of residual or aspiration after the swallow   to facilitate a safe swallow. Trials of puree Applesauce times 5 patient complaining of residual after the swallow and odynophagia in the left neck side of the neck.  Max assist to cue patient for compensatory strategies.   STG 1c: 12 weeks: Patient will complete a snack and/or meal  using compensatory strategies with min assist and minimal clinical signs and symptoms of aspiration  to  facilitate a safe swallow.      STG 1d: 12 weeks:  Patient will be educated on signs and symptoms of aspiration and diet with patient verbalizing understanding with moderate cueing.   education Diet  HEP  Signs and symptoms of aspiration  POC Max assist   STG 2a: 12 weeks:  Patient will complete pharyngeal isometrics with min to mod assist to strengthen pharyngeal wall decreasing risk of residual after the swallow.       STG 2b: 12 weeks:  Patient will complete tongue base isometrics to increase tongue base strength to a 4/5 reducing  residual after the swallow, and increasing timeliness of swallow.  Tongue elevation against resistance   2 sets of 10 repetitions Max assist to elevate tongue, especially to elevate tongue on second set   STG2c: 12 weeks:Patient will complete coordination exercises with min to mod assist to  reduce premature spillage/timely eppiglottic closure prior, during and/or after  the swallow decreasing the risk of aspiration Effortful swallows 2 sets of 10 effortful swallows Max assist to stimulate swallow secondary to patient demonstrate decreased timing.    STG 2d: 12 weeks:  Patient will be given a home exercise program and demonstrate understanding of the program with min to mod assist. HEP 3 sets of 10 repetitions; 3-5 times per day:    Yawn  Effortful swallow  Chin tuck against resistance    STG 2e: 12 weeks:  Patient will complete laryngeal elevation exercises with min assist to increase laryngeal elevation, eppiglottic inversion and coordination reducing risk of aspiration. Chin tuck against resistance  2 sets of 10 repetitions Max assist   STG 2f: 12 Patient will complete Respiratory Muscle Training to include Expiratory Muscle Strength Training for 8 weeks to increase laryngeal elevation and coordination.                        ASSESSMENT/PLAN     ASSESSMENT: Patient requires the skills of a therapist to provide maximum assist to increase strength and timeliness of swallow increasing safety of swallow and decrease risk of aspiration that may lead to pneumonia.    Progress:   PLAN: continue plan of care    Progress Note Due Date:8/18/24  Recertification Due Date:10/18/24    SIGNATURE: SAM [place], [place] License #: [id number]  Electronically Signed on 8/15/2024            Adult Outpatient Rehabiliation                                             [address] [place] [address]. [address]  [phone] Office  [phone] Fax

## 2024-08-15 NOTE — TELEPHONE ENCOUNTER
OSW received a notification from the radiation oncology team that Ms. Llanes has been showing to her appointments late and was approximately 25 minutes late today. OSW contacted [employer] and updated her arrival time to 0945 every Thursday, 8/22/24-10/10/24. Advised [facility] that Ms. Llanes will also be here longer than her normal time next Thursday, for approximately 2 hours or so. They will make note of this and Ms. Llanes will need to call when she's ready to be picked up. OSW support remains available.

## 2024-08-19 RX ORDER — ONDANSETRON 4 MG/1
4 TABLET, ORALLY DISINTEGRATING ORAL EVERY 8 HOURS PRN
Qty: 14 TABLET | Refills: 0 | Status: SHIPPED | OUTPATIENT
Start: 2024-08-19

## 2024-08-19 NOTE — TELEPHONE ENCOUNTER
Rx Refill Note  Requested Prescriptions     Pending Prescriptions Disp Refills    ondansetron ODT (ZOFRAN-ODT) 4 MG disintegrating tablet 14 tablet 0     Sig: Place 1 tablet on the tongue Every 8 (Eight) Hours As Needed for Nausea or Vomiting.      Last office visit with prescribing clinician: 8/7/2024   Last telemedicine visit with prescribing clinician: 3/5/2024   Next office visit with prescribing clinician: 8/21/2024                         Would you like a call back once the refill request has been completed: [] Yes [] No    If the office needs to give you a call back, can they leave a voicemail: [] Yes [] No    Maximilian Villanueva Rep  08/19/24, 13:29 EDT

## 2024-08-19 NOTE — TELEPHONE ENCOUNTER
PT also complains of UTI symptoms and asks if there is anything she can take for that. She is not feeling good and asks for call back.

## 2024-08-21 ENCOUNTER — APPOINTMENT (OUTPATIENT)
Dept: GENERAL RADIOLOGY | Facility: HOSPITAL | Age: 59
DRG: 177 | End: 2024-08-21
Payer: COMMERCIAL

## 2024-08-21 ENCOUNTER — OFFICE VISIT (OUTPATIENT)
Dept: FAMILY MEDICINE CLINIC | Facility: CLINIC | Age: 59
End: 2024-08-21
Payer: COMMERCIAL

## 2024-08-21 ENCOUNTER — HOSPITAL ENCOUNTER (INPATIENT)
Facility: HOSPITAL | Age: 59
LOS: 4 days | Discharge: HOME | DRG: 177 | End: 2024-08-25
Attending: EMERGENCY MEDICINE | Admitting: STUDENT IN AN ORGANIZED HEALTH CARE EDUCATION/TRAINING PROGRAM
Payer: COMMERCIAL

## 2024-08-21 ENCOUNTER — TELEPHONE (OUTPATIENT)
Dept: OTOLARYNGOLOGY | Facility: CLINIC | Age: 59
End: 2024-08-21
Payer: COMMERCIAL

## 2024-08-21 ENCOUNTER — APPOINTMENT (OUTPATIENT)
Dept: CT IMAGING | Facility: HOSPITAL | Age: 59
DRG: 177 | End: 2024-08-21
Payer: COMMERCIAL

## 2024-08-21 ENCOUNTER — PATIENT OUTREACH (OUTPATIENT)
Dept: CASE MANAGEMENT | Facility: OTHER | Age: 59
End: 2024-08-21
Payer: COMMERCIAL

## 2024-08-21 VITALS
OXYGEN SATURATION: 93 % | HEIGHT: 64 IN | TEMPERATURE: 98 F | BODY MASS INDEX: 22.71 KG/M2 | DIASTOLIC BLOOD PRESSURE: 60 MMHG | WEIGHT: 133 LBS | SYSTOLIC BLOOD PRESSURE: 104 MMHG | HEART RATE: 132 BPM

## 2024-08-21 DIAGNOSIS — G89.4 CHRONIC PAIN SYNDROME: ICD-10-CM

## 2024-08-21 DIAGNOSIS — Z85.21 HISTORY OF LARYNGEAL CANCER: ICD-10-CM

## 2024-08-21 DIAGNOSIS — R30.9 URINARY PAIN: Primary | ICD-10-CM

## 2024-08-21 DIAGNOSIS — F41.9 ANXIETY: ICD-10-CM

## 2024-08-21 DIAGNOSIS — E11.9 TYPE 2 DIABETES MELLITUS WITHOUT COMPLICATION, UNSPECIFIED WHETHER LONG TERM INSULIN USE: ICD-10-CM

## 2024-08-21 DIAGNOSIS — R13.12 OROPHARYNGEAL DYSPHAGIA: ICD-10-CM

## 2024-08-21 DIAGNOSIS — T40.2X4A OPIOID OVERDOSE, UNDETERMINED INTENT, INITIAL ENCOUNTER: Primary | ICD-10-CM

## 2024-08-21 DIAGNOSIS — G89.3 CANCER ASSOCIATED PAIN: Primary | ICD-10-CM

## 2024-08-21 DIAGNOSIS — R26.2 DIFFICULTY WALKING: ICD-10-CM

## 2024-08-21 DIAGNOSIS — R00.0 TACHYCARDIA WITH HEART RATE 121-140 BEATS PER MINUTE: ICD-10-CM

## 2024-08-21 DIAGNOSIS — R30.9 PAINFUL URINATION: ICD-10-CM

## 2024-08-21 LAB
ALBUMIN SERPL-MCNC: 4 G/DL (ref 3.5–5.2)
ALBUMIN/GLOB SERPL: 1.3 G/DL
ALP SERPL-CCNC: 66 U/L (ref 39–117)
ALT SERPL W P-5'-P-CCNC: 7 U/L (ref 1–33)
ANION GAP SERPL CALCULATED.3IONS-SCNC: 9.4 MMOL/L (ref 5–15)
ARTERIAL PATENCY WRIST A: POSITIVE
AST SERPL-CCNC: 15 U/L (ref 1–32)
ATMOSPHERIC PRESS: 751 MMHG
BASE EXCESS BLDA CALC-SCNC: 5.8 MMOL/L (ref -2–2)
BASOPHILS # BLD AUTO: 0.01 10*3/MM3 (ref 0–0.2)
BASOPHILS NFR BLD AUTO: 0.1 % (ref 0–1.5)
BDY SITE: ABNORMAL
BILIRUB BLD-MCNC: NEGATIVE MG/DL
BILIRUB SERPL-MCNC: 0.4 MG/DL (ref 0–1.2)
BILIRUB UR QL STRIP: NEGATIVE
BUN BLDA-MCNC: 11 MG/DL (ref 8–23)
BUN SERPL-MCNC: 10 MG/DL (ref 6–20)
BUN/CREAT SERPL: 15.2 (ref 7–25)
CA-I BLDA-SCNC: 1.21 MMOL/L (ref 1.13–1.32)
CALCIUM SPEC-SCNC: 9.5 MG/DL (ref 8.6–10.5)
CHLORIDE BLDA-SCNC: 97 MMOL/L (ref 98–107)
CHLORIDE SERPL-SCNC: 98 MMOL/L (ref 98–107)
CLARITY UR: CLEAR
CLARITY, POC: ABNORMAL
CO2 BLDA-SCNC: 31.4 MMOL/L (ref 23–27)
CO2 SERPL-SCNC: 29.6 MMOL/L (ref 22–29)
COLOR UR: YELLOW
COLOR UR: YELLOW
CREAT BLDA-MCNC: 0.41 MG/DL (ref 0.6–1.3)
CREAT SERPL-MCNC: 0.66 MG/DL (ref 0.57–1)
CRP SERPL-MCNC: 13.55 MG/DL (ref 0–0.5)
D-LACTATE SERPL-SCNC: 1 MMOL/L
D-LACTATE SERPL-SCNC: 1 MMOL/L (ref 0.5–2)
D-LACTATE SERPL-SCNC: 1.1 MMOL/L (ref 0.5–2)
DEPRECATED RDW RBC AUTO: 46.4 FL (ref 37–54)
EGFRCR SERPLBLD CKD-EPI 2021: 101.8 ML/MIN/1.73
EOSINOPHIL # BLD AUTO: 0.03 10*3/MM3 (ref 0–0.4)
EOSINOPHIL NFR BLD AUTO: 0.3 % (ref 0.3–6.2)
ERYTHROCYTE [DISTWIDTH] IN BLOOD BY AUTOMATED COUNT: 13.9 % (ref 12.3–15.4)
ERYTHROCYTE [SEDIMENTATION RATE] IN BLOOD: 27 MM/HR (ref 0–30)
GLOBULIN UR ELPH-MCNC: 3.1 GM/DL
GLUCOSE BLDC GLUCOMTR-MCNC: 138 MG/DL (ref 65–99)
GLUCOSE SERPL-MCNC: 141 MG/DL (ref 65–99)
GLUCOSE UR STRIP-MCNC: NEGATIVE MG/DL
GLUCOSE UR STRIP-MCNC: NEGATIVE MG/DL
HCO3 BLDA-SCNC: 31.2 MMOL/L (ref 22–26)
HCT VFR BLD AUTO: 45.4 % (ref 34–46.6)
HCT VFR BLD CALC: 44 % (ref 38–51)
HEMODILUTION: NO
HGB BLD-MCNC: 14.2 G/DL (ref 12–15.9)
HGB BLDA-MCNC: 15 G/DL (ref 12–18)
HGB UR QL STRIP.AUTO: NEGATIVE
HOLD SPECIMEN: NORMAL
HOLD SPECIMEN: NORMAL
IMM GRANULOCYTES # BLD AUTO: 0.03 10*3/MM3 (ref 0–0.05)
IMM GRANULOCYTES NFR BLD AUTO: 0.3 % (ref 0–0.5)
INHALED O2 CONCENTRATION: 28 %
KETONES UR QL STRIP: NEGATIVE
KETONES UR QL: NEGATIVE
LEUKOCYTE EST, POC: ABNORMAL
LEUKOCYTE ESTERASE UR QL STRIP.AUTO: NEGATIVE
LYMPHOCYTES # BLD AUTO: 0.49 10*3/MM3 (ref 0.7–3.1)
LYMPHOCYTES NFR BLD AUTO: 5.1 % (ref 19.6–45.3)
MAGNESIUM SERPL-MCNC: 1.8 MG/DL (ref 1.6–2.6)
MCH RBC QN AUTO: 28.5 PG (ref 26.6–33)
MCHC RBC AUTO-ENTMCNC: 31.3 G/DL (ref 31.5–35.7)
MCV RBC AUTO: 91.2 FL (ref 79–97)
MODALITY: ABNORMAL
MONOCYTES # BLD AUTO: 0.64 10*3/MM3 (ref 0.1–0.9)
MONOCYTES NFR BLD AUTO: 6.7 % (ref 5–12)
NEUTROPHILS NFR BLD AUTO: 8.35 10*3/MM3 (ref 1.7–7)
NEUTROPHILS NFR BLD AUTO: 87.5 % (ref 42.7–76)
NITRITE UR QL STRIP: NEGATIVE
NITRITE UR-MCNC: NEGATIVE MG/ML
NRBC BLD AUTO-RTO: 0 /100 WBC (ref 0–0.2)
PCO2 BLDA: 46.9 MM HG (ref 35–45)
PH BLDA: 7.43 PH UNITS (ref 7.35–7.45)
PH UR STRIP.AUTO: 7 [PH] (ref 5–8)
PH UR: 6 [PH] (ref 5–8)
PLATELET # BLD AUTO: 145 10*3/MM3 (ref 140–450)
PMV BLD AUTO: 9.3 FL (ref 6–12)
PO2 BLD: 263 MM[HG] (ref 0–500)
PO2 BLDA: 73.5 MM HG (ref 80–100)
POTASSIUM BLDA-SCNC: 4.1 MMOL/L (ref 3.5–5)
POTASSIUM SERPL-SCNC: 4.1 MMOL/L (ref 3.5–5.2)
PROT SERPL-MCNC: 7.1 G/DL (ref 6–8.5)
PROT UR QL STRIP: NEGATIVE
PROT UR STRIP-MCNC: NEGATIVE MG/DL
QT INTERVAL: 363 MS
QTC INTERVAL: 480 MS
RBC # BLD AUTO: 4.98 10*6/MM3 (ref 3.77–5.28)
RBC # UR STRIP: ABNORMAL /UL
SAO2 % BLDCOA: 94.8 % (ref 95–99)
SODIUM BLD-SCNC: 137 MMOL/L (ref 131–143)
SODIUM SERPL-SCNC: 137 MMOL/L (ref 136–145)
SP GR UR STRIP: 1.01 (ref 1–1.03)
SP GR UR: 1.01 (ref 1–1.03)
TROPONIN T SERPL HS-MCNC: 25 NG/L
TROPONIN T SERPL HS-MCNC: 29 NG/L
TSH SERPL DL<=0.05 MIU/L-ACNC: 1.13 UIU/ML (ref 0.27–4.2)
UROBILINOGEN UR QL STRIP: NORMAL
UROBILINOGEN UR QL: ABNORMAL
WBC NRBC COR # BLD AUTO: 9.55 10*3/MM3 (ref 3.4–10.8)
WHOLE BLOOD HOLD COAG: NORMAL
WHOLE BLOOD HOLD SPECIMEN: NORMAL

## 2024-08-21 PROCEDURE — 99223 1ST HOSP IP/OBS HIGH 75: CPT | Performed by: STUDENT IN AN ORGANIZED HEALTH CARE EDUCATION/TRAINING PROGRAM

## 2024-08-21 PROCEDURE — 83605 ASSAY OF LACTIC ACID: CPT | Performed by: STUDENT IN AN ORGANIZED HEALTH CARE EDUCATION/TRAINING PROGRAM

## 2024-08-21 PROCEDURE — 80047 BASIC METABLC PNL IONIZED CA: CPT

## 2024-08-21 PROCEDURE — 82803 BLOOD GASES ANY COMBINATION: CPT

## 2024-08-21 PROCEDURE — 71045 X-RAY EXAM CHEST 1 VIEW: CPT

## 2024-08-21 PROCEDURE — 86140 C-REACTIVE PROTEIN: CPT | Performed by: STUDENT IN AN ORGANIZED HEALTH CARE EDUCATION/TRAINING PROGRAM

## 2024-08-21 PROCEDURE — 99291 CRITICAL CARE FIRST HOUR: CPT

## 2024-08-21 PROCEDURE — 85652 RBC SED RATE AUTOMATED: CPT | Performed by: STUDENT IN AN ORGANIZED HEALTH CARE EDUCATION/TRAINING PROGRAM

## 2024-08-21 PROCEDURE — 36415 COLL VENOUS BLD VENIPUNCTURE: CPT | Performed by: STUDENT IN AN ORGANIZED HEALTH CARE EDUCATION/TRAINING PROGRAM

## 2024-08-21 PROCEDURE — 83605 ASSAY OF LACTIC ACID: CPT | Performed by: EMERGENCY MEDICINE

## 2024-08-21 PROCEDURE — 93005 ELECTROCARDIOGRAM TRACING: CPT | Performed by: EMERGENCY MEDICINE

## 2024-08-21 PROCEDURE — 84484 ASSAY OF TROPONIN QUANT: CPT

## 2024-08-21 PROCEDURE — 25010000002 ONDANSETRON PER 1 MG: Performed by: EMERGENCY MEDICINE

## 2024-08-21 PROCEDURE — 83605 ASSAY OF LACTIC ACID: CPT

## 2024-08-21 PROCEDURE — 84484 ASSAY OF TROPONIN QUANT: CPT | Performed by: STUDENT IN AN ORGANIZED HEALTH CARE EDUCATION/TRAINING PROGRAM

## 2024-08-21 PROCEDURE — 36600 WITHDRAWAL OF ARTERIAL BLOOD: CPT

## 2024-08-21 PROCEDURE — 70450 CT HEAD/BRAIN W/O DYE: CPT

## 2024-08-21 PROCEDURE — 85025 COMPLETE CBC W/AUTO DIFF WBC: CPT

## 2024-08-21 PROCEDURE — 83735 ASSAY OF MAGNESIUM: CPT

## 2024-08-21 PROCEDURE — 25010000002 NALOXONE HCL 2 MG/2ML SOLUTION PREFILLED SYRINGE: Performed by: EMERGENCY MEDICINE

## 2024-08-21 PROCEDURE — 80053 COMPREHEN METABOLIC PANEL: CPT

## 2024-08-21 PROCEDURE — 81003 URINALYSIS AUTO W/O SCOPE: CPT | Performed by: EMERGENCY MEDICINE

## 2024-08-21 PROCEDURE — 84443 ASSAY THYROID STIM HORMONE: CPT | Performed by: STUDENT IN AN ORGANIZED HEALTH CARE EDUCATION/TRAINING PROGRAM

## 2024-08-21 PROCEDURE — 87040 BLOOD CULTURE FOR BACTERIA: CPT | Performed by: STUDENT IN AN ORGANIZED HEALTH CARE EDUCATION/TRAINING PROGRAM

## 2024-08-21 PROCEDURE — 93005 ELECTROCARDIOGRAM TRACING: CPT

## 2024-08-21 RX ORDER — SODIUM CHLORIDE 0.9 % (FLUSH) 0.9 %
10 SYRINGE (ML) INJECTION AS NEEDED
Status: DISCONTINUED | OUTPATIENT
Start: 2024-08-21 | End: 2024-08-25 | Stop reason: HOSPADM

## 2024-08-21 RX ORDER — NITROGLYCERIN 0.4 MG/1
0.4 TABLET SUBLINGUAL
Status: DISCONTINUED | OUTPATIENT
Start: 2024-08-21 | End: 2024-08-25 | Stop reason: HOSPADM

## 2024-08-21 RX ORDER — NALOXONE HYDROCHLORIDE 1 MG/ML
2 INJECTION INTRAMUSCULAR; INTRAVENOUS; SUBCUTANEOUS ONCE
Status: COMPLETED | OUTPATIENT
Start: 2024-08-21 | End: 2024-08-21

## 2024-08-21 RX ORDER — ONDANSETRON 2 MG/ML
4 INJECTION INTRAMUSCULAR; INTRAVENOUS EVERY 6 HOURS PRN
Status: DISCONTINUED | OUTPATIENT
Start: 2024-08-21 | End: 2024-08-25 | Stop reason: HOSPADM

## 2024-08-21 RX ORDER — IPRATROPIUM BROMIDE AND ALBUTEROL SULFATE 2.5; .5 MG/3ML; MG/3ML
3 SOLUTION RESPIRATORY (INHALATION)
Status: DISCONTINUED | OUTPATIENT
Start: 2024-08-21 | End: 2024-08-25 | Stop reason: HOSPADM

## 2024-08-21 RX ORDER — HYDRALAZINE HYDROCHLORIDE 20 MG/ML
10 INJECTION INTRAMUSCULAR; INTRAVENOUS EVERY 6 HOURS PRN
Status: DISCONTINUED | OUTPATIENT
Start: 2024-08-21 | End: 2024-08-25 | Stop reason: HOSPADM

## 2024-08-21 RX ORDER — ACETAMINOPHEN 325 MG/1
650 TABLET ORAL EVERY 6 HOURS PRN
Status: DISCONTINUED | OUTPATIENT
Start: 2024-08-21 | End: 2024-08-25 | Stop reason: HOSPADM

## 2024-08-21 RX ORDER — ONDANSETRON 2 MG/ML
4 INJECTION INTRAMUSCULAR; INTRAVENOUS ONCE
Status: COMPLETED | OUTPATIENT
Start: 2024-08-21 | End: 2024-08-21

## 2024-08-21 RX ORDER — BUDESONIDE 0.5 MG/2ML
0.5 INHALANT ORAL
Status: DISCONTINUED | OUTPATIENT
Start: 2024-08-21 | End: 2024-08-25 | Stop reason: HOSPADM

## 2024-08-21 RX ORDER — SODIUM CHLORIDE 0.9 % (FLUSH) 0.9 %
10 SYRINGE (ML) INJECTION EVERY 12 HOURS SCHEDULED
Status: DISCONTINUED | OUTPATIENT
Start: 2024-08-21 | End: 2024-08-25 | Stop reason: HOSPADM

## 2024-08-21 RX ORDER — SODIUM CHLORIDE, SODIUM LACTATE, POTASSIUM CHLORIDE, CALCIUM CHLORIDE 600; 310; 30; 20 MG/100ML; MG/100ML; MG/100ML; MG/100ML
50 INJECTION, SOLUTION INTRAVENOUS CONTINUOUS
Status: DISCONTINUED | OUTPATIENT
Start: 2024-08-21 | End: 2024-08-25 | Stop reason: HOSPADM

## 2024-08-21 RX ORDER — SODIUM CHLORIDE 9 MG/ML
40 INJECTION, SOLUTION INTRAVENOUS AS NEEDED
Status: DISCONTINUED | OUTPATIENT
Start: 2024-08-21 | End: 2024-08-25 | Stop reason: HOSPADM

## 2024-08-21 RX ADMIN — ONDANSETRON 4 MG: 2 INJECTION INTRAMUSCULAR; INTRAVENOUS at 18:33

## 2024-08-21 RX ADMIN — NALOXONE HYDROCHLORIDE 2 MG: 1 INJECTION PARENTERAL at 18:01

## 2024-08-21 NOTE — TELEPHONE ENCOUNTER
I spoke to patient and explained that Dr. Mcgarry is no longer with [facility].    I offered to reschedule with one of our other providers. We lost connection so I called her back but it went to voicemail.    Please warm transfer to Practice Manager.   
[phone]

## 2024-08-21 NOTE — LETTER
August 25, 2024      [facility]  [address] [place] [address]  [phone]          Patient: Isha Llanes   YOB: 1965   Date of Visit: 8/21/2024       To Whom It May Concern:    Isha Llanes was seen at [facility] on 8/21/2024.    Please excuse Sheila Watson from work 8/22/24 and 8/23/24.

## 2024-08-21 NOTE — ED PROVIDER NOTES
Time: 5:45 PM EDT  Date of encounter:  8/21/2024  Independent Historian/Clinical History and Information was obtained by:   Daughter    History is limited by: N/A    Chief Complaint: Altered mental status      History of Present Illness:  Patient is a 58 y.o. year old female who presents to the emergency department for evaluation of altered mental status, weakness, and 1 episode of vomiting that occurred earlier today.  Daughter denies cough.  Daughter states that she was in her normal state of health yesterday.    HPI    Patient Care Team  Primary Care Provider: Katia Wright MD    Past Medical History:     Allergies   Allergen Reactions    Amoxicillin Shortness Of Breath     Has tolerated Cefazolin, Ceftriaxone, and Cefepime -Jermaine Melida, RP    Ceclor [Cefaclor] Shortness Of Breath     Has tolerated Cefazolin, Ceftriaxone, and Cefepime -Jermaine Melida, RP      Penicillins Shortness Of Breath     Has tolerated Cefazolin, Ceftriaxone, and Cefepime -Jermaine Melida, H    Sulfa Antibiotics Rash    Valium [Diazepam] Mental Status Change     DEPRESSED    Ambien [Zolpidem] Mental Status Change    Aspirin GI Intolerance    Ativan [Lorazepam] Mental Status Change    Benadryl [Diphenhydramine] Anxiety     AND MAKES HER HYPER    Biaxin [Clarithromycin] Unknown - Low Severity    Cephalexin Unknown - Low Severity    Clindamycin Unknown - Low Severity    Compazine [Prochlorperazine Edisylate] Rash    Contrast Dye (Echo Or Unknown Ct/Mr) Other (See Comments)     Caused pain in her arm    Doxycycline Rash    Nsaids GI Intolerance    Phenergan [Promethazine Hcl] GI Intolerance    Promethazine GI Intolerance    Vancomycin Itching     Past Medical History:   Diagnosis Date    Anesthesia     REPORTS HAS GOT REAL EMOTIONAL AND HAS BECOME ANGRY BEFORE    Anxiety     Aortic stenosis, severe     HAS BIO VALVE REPLACED    Arthritis     Asthma     Back pain     Blood clotting disorder     Cancer     Cancer associated pain      HODGKINS LYMPHOMA    CHF (congestive heart failure)     Chronic low back pain with sciatica     Chronic pain disorder     COPD (chronic obstructive pulmonary disease)     Coronary artery disease     Diabetes mellitus     TYPE 2    Diabetes mellitus     Dyspnea on effort     GERD (gastroesophageal reflux disease)     H/O lumpectomy     H/O: hysterectomy     Hiatal hernia     History of degenerative disc disease     History of kidney stones     History of pulmonary embolus (PE)     History of transfusion     AS A CHILD NO TRANSFUSION REACTION    History of transfusion     Hodgkin's lymphoma     5/19/23  5 YEARS SINCE LAST CHEMO TX    Hypertension     Immobility     Liver disease     DENIES ANY CURRENT ISSUES    Lupus     Mitral valve prolapse     Nausea vomiting and diarrhea 3/7/2024    Panic disorder     Pelvic pain     Peripheral neuropathy     Personal history of DVT (deep vein thrombosis)     Presence of intrathecal pump     PTSD (post-traumatic stress disorder)     Sacroiliac joint disease     SI (sacroiliac) joint inflammation     Sleep apnea     Venous insufficiency      Past Surgical History:   Procedure Laterality Date    ABDOMINAL SURGERY      BACK SURGERY      SPINAL FUSION     BACK SURGERY      BLADDER REPAIR      CARDIAC CATHETERIZATION N/A 03/06/2019    Procedure: Valvuloplasty;  Surgeon: Wayne Johnson MD;  Location:  INVASIVE LOCATION;  Service: Cardiology    CARDIAC CATHETERIZATION      CARDIAC CATHETERIZATION Left 5/31/2023    Procedure: Cardiac Catheterization/Vascular Study;  Surgeon: Poncho Reid MD;  Location: [facility] CATH INVASIVE LOCATION;  Service: Cardiovascular;  Laterality: Left;    CARDIAC SURGERY      CARDIAC VALVE REPLACEMENT  2021    CHOLECYSTECTOMY      COLONOSCOPY N/A 12/29/2021    Procedure: COLONOSCOPY;  Surgeon: Karine Orlando MD;  Location: [facility] ENDOSCOPY;  Service: Gastroenterology;  Laterality: N/A;  POOR PREP    COLONOSCOPY N/A 04/13/2022     Procedure: COLONOSCOPY WITH POLYPECTOMY;  Surgeon: Karine Orlando MD;  Location: [facility] ENDOSCOPY;  Service: Gastroenterology;  Laterality: N/A;  COLON POLYP, HEMORRHOIDS, POOR PREP    COLONOSCOPY      DIRECT LARYNGOSCOPY, ESOPHAGOSCOPY, BRONCHOSCOPY N/A 5/22/2023    Procedure: DIRECT LARYNGOSCOPY WITH BIOPSIES , ESOPHAGOSCOPY, BRONCHOSCOPY;  Surgeon: Ronnie Mcgarry MD;  Location: [facility] OR OSC;  Service: ENT;  Laterality: N/A;    ENDOSCOPY N/A 12/29/2021    Procedure: ESOPHAGOGASTRODUODENOSCOPY;  Surgeon: Karine Orlando MD;  Location: [facility] ENDOSCOPY;  Service: Gastroenterology;  Laterality: N/A;  ESOPHAGITIS, GASTRITIS, HIATAL HERNIA    ENDOSCOPY      ENDOSCOPY N/A 4/16/2024    Procedure: ESOPHAGOGASTRODUODENOSCOPY WITH BIOPSIES;  Surgeon: Karine Orlando MD;  Location: [facility] ENDOSCOPY;  Service: Gastroenterology;  Laterality: N/A;  ESOPHAGITIS, HIATAL HERNIA    HYSTERECTOMY      LYMPHADENECTOMY      PAIN PUMP INSERTION/REVISION N/A 10/18/2019    Procedure: PAIN PUMP INSERTION [facility] 10-18-19 [place];  Surgeon: Horace Rios MD;  Location: [facility];  Service: Pain Management    PAIN PUMP INSERTION/REVISION N/A 11/23/2020    Procedure: pain pump removal;  Surgeon: Horace Rios MD;  Location: [facility];  Service: Pain Management;  Laterality: N/A;    PEG TUBE INSERTION N/A 7/26/2023    Procedure: PERCUTANEOUS ENDOSCOPIC GASTROSTOMY TUBE INSERTION;  Surgeon: Kody Mercer MD;  Location: [facility] ENDOSCOPY;  Service: General;  Laterality: N/A;  SUCCESSFUL PEG TUBE PLACE PLACEMENT    PORTACATH PLACEMENT      IN LEFT CHEST REPORTS THAT IT IS NOT FUNCTIONING    SKIN BIOPSY      TONSILLECTOMY      TUBAL ABDOMINAL LIGATION      TUMOR REMOVAL       Family History   Problem Relation Age of Onset    Heart failure Mother     Anxiety disorder Mother     Prostate cancer Father     Depression Sister     Bipolar disorder Sister     Pancreatic cancer Sister     ADD / ADHD  Brother     Thyroid cancer Maternal Grandmother     Pancreatic cancer Paternal Grandmother     ADD / ADHD Grandson     ADD / ADHD Granddaughter     ADD / ADHD Nephew     Malig Hyperthermia Neg Hx        Home Medications:  Prior to Admission medications    Medication Sig Start Date End Date Taking? Authorizing Provider   acetaminophen (Tylenol) 325 MG tablet Take 1 tablet by mouth Every 6 (Six) Hours As Needed for Mild Pain, Headache or Fever.    Provider, MD Nette   albuterol (PROVENTIL) 2.5 MG/0.5ML nebulizer solution Take 2.5 mg by nebulization Every 4 (Four) Hours As Needed for Wheezing or Shortness of Air. 2/9/24   Virgil Navarro MD   albuterol sulfate  (90 Base) MCG/ACT inhaler Inhale 2 puffs Every 4 (Four) Hours As Needed for Wheezing. 11/20/23   Katia Wright MD   ALPRAZolam (Xanax) 0.5 MG tablet Take 1 tablet by mouth At Night As Needed for Anxiety for up to 16 days. 8/7/24 8/23/24  Katia Wright MD   ciprofloxacin (CIPRO) 500 MG tablet Take 1 tablet by mouth Every 12 (Twelve) Hours. 8/3/24   ProviderNette MD   clopidogrel (PLAVIX) 75 MG tablet Take 1 tablet by mouth Daily. 8/7/24   Katia Wright MD   furosemide (Lasix) 40 MG tablet Take 1 tablet by mouth Daily. 8/7/24   Katia Wright MD   mupirocin (BACTROBAN) 2 % ointment Apply 1 Application topically to the appropriate area as directed 3 (Three) Times a Day. 4/10/24   Katia Wright MD   nicotine (NICODERM CQ) 21 MG/24HR patch Place 1 patch on the skin as directed by provider Daily.    Provider, MD Nette   O2 (OXYGEN) Inhale 1 (One) Time.    Provider, MD Nette   ondansetron ODT (ZOFRAN-ODT) 4 MG disintegrating tablet Place 1 tablet on the tongue Every 8 (Eight) Hours As Needed for Nausea or Vomiting. 8/19/24   Katia Wright MD   pain patient supplied pump by Intrathecal route Continuous. Type of Medication: Hydromorphone 15 mg/ml and Bupivacaine 0.5 mg/ml  Provider:Dr. Boudreaux  Provider number: [phone]  Basal Dose:  "Hydromorphone 7.518 mg/day Bupivacaine 0.07524 mg/day  Pump capacity: 40ml  ( MAX of Hydromorphone 9.456 mg/ day; Bupivacaine 0.02872 mg /day)  Bolus Dose:Hydromorphone 0.500 mg, Bupivacaine 0.52009 mg  Max activations: 4 per day  Lockout 3 hours. 1 Bolus per every 3 hours   Next refill-  every 60 days 09/20/24   Alarm date- 09/27/24  Pump manufacture:MedManny Escobar MD   predniSONE (DELTASONE) 10 MG tablet Take 1 tablet by mouth Daily for 90 days. 8/7/24 11/5/24  Katia Wright MD        Social History:   Social History     Tobacco Use    Smoking status: Every Day     Current packs/day: 1.50     Average packs/day: 1.5 packs/day for 45.6 years (68.5 ttl pk-yrs)     Types: Cigarettes     Start date: 1979     Passive exposure: Current    Smokeless tobacco: Never   Vaping Use    Vaping status: Never Used   Substance Use Topics    Alcohol use: Never    Drug use: Never         Review of Systems:  Review of Systems   Unable to perform ROS: Acuity of condition        Physical Exam:  BP 94/67   Pulse (!) 121   Temp 99.8 °F (37.7 °C) (Oral)   Resp 18   Ht 162.6 cm (64\")   Wt 65.7 kg (144 lb 13.5 oz)   LMP  (LMP Unknown)   SpO2 (!) 88%   BMI 24.86 kg/m²     Physical Exam  Vitals and nursing note reviewed.   Constitutional:       General: She is not in acute distress.     Appearance: Normal appearance. She is not toxic-appearing.      Comments: (+) Lethargic   HENT:      Head: Normocephalic and atraumatic.      Jaw: There is normal jaw occlusion.   Eyes:      General: Lids are normal.      Extraocular Movements: Extraocular movements intact.      Conjunctiva/sclera: Conjunctivae normal.      Pupils: Pupils are equal, round, and reactive to light.   Cardiovascular:      Rate and Rhythm: Normal rate and regular rhythm.      Pulses: Normal pulses.      Heart sounds: Normal heart sounds.   Pulmonary:      Effort: Pulmonary effort is normal. No respiratory distress.      Breath sounds: Normal breath sounds. No " wheezing or rhonchi.   Abdominal:      General: Abdomen is flat.      Palpations: Abdomen is soft.      Tenderness: There is no abdominal tenderness. There is no guarding or rebound.   Musculoskeletal:         General: Normal range of motion.      Cervical back: Normal range of motion and neck supple.      Right lower leg: No edema.      Left lower leg: No edema.   Skin:     General: Skin is warm and dry.   Neurological:      Motor: Weakness present.   Psychiatric:         Mood and Affect: Mood normal.                  Procedures:  Procedures      Medical Decision Making:      Comorbidities that affect care:    Cancer    External Notes reviewed:    Hospital Discharge Summary: Patient's last admission was for acute on chronic respiratory failure      The following orders were placed and all results were independently analyzed by me:  Orders Placed This Encounter   Procedures    XR Chest 1 View    CT Head Without Contrast    Salol Draw    Comprehensive Metabolic Panel    Single High Sensitivity Troponin T    Magnesium    Urinalysis With Microscopic If Indicated (No Culture) - Urine, Clean Catch    CBC Auto Differential    Arterial Blood Gas + Electrolytes    Lactic Acid, Plasma    Blood Gas, Arterial -    NPO Diet NPO Type: Strict NPO    Undress & Gown    Continuous Pulse Oximetry    Vital Signs    Inpatient Hospitalist Consult    Oxygen Therapy- Nasal Cannula; Titrate 1-6 LPM Per SpO2; 90 - 95%    POC Glucose Once    POC Chem Panel    POC Lactate    ECG 12 Lead ED Triage Standing Order; Weak / Dizzy / AMS    Insert Peripheral IV    Fall Precautions    CBC & Differential    Green Top (Gel)    Lavender Top    Gold Top - SST    Light Blue Top       Medications Given in the Emergency Department:  Medications   sodium chloride 0.9 % flush 10 mL (has no administration in time range)   Naloxone HCl (NARCAN) injection 2 mg (2 mg Intravenous Given 8/21/24 1801)   ondansetron (ZOFRAN) injection 4 mg (4 mg Intravenous Given  8/21/24 1833)        ED Course:         Labs:    Lab Results (last 24 hours)       Procedure Component Value Units Date/Time    POCT urinalysis dipstick, manual [[id number]]  (Abnormal) Collected: 08/21/24 1357    Specimen: Urine Updated: 08/21/24 1358     Color Yellow     Clarity, UA Cloudy     Glucose, UA Negative mg/dL      Bilirubin Negative     Ketones, UA Negative     Specific Gravity  1.015     Blood, UA Trace     pH, Urine 6.0     Protein, POC Negative mg/dL      Urobilinogen, UA 0.2 E.U./dL     Leukocytes Small (1+)     Nitrite, UA Negative    CBC & Differential [[id number]]  (Abnormal) Collected: 08/21/24 1619    Specimen: Blood Updated: 08/21/24 1628    Narrative:      The following orders were created for panel order CBC & Differential.  Procedure                               Abnormality         Status                     ---------                               -----------         ------                     CBC Auto Differential[[id number]]        Abnormal            Final result                 Please view results for these tests on the individual orders.    Comprehensive Metabolic Panel [[id number]]  (Abnormal) Collected: 08/21/24 1619    Specimen: Blood Updated: 08/21/24 1705     Glucose 141 mg/dL      BUN 10 mg/dL      Creatinine 0.66 mg/dL      Sodium 137 mmol/L      Potassium 4.1 mmol/L      Chloride 98 mmol/L      CO2 29.6 mmol/L      Calcium 9.5 mg/dL      Total Protein 7.1 g/dL      Albumin 4.0 g/dL      ALT (SGPT) 7 U/L      AST (SGOT) 15 U/L      Alkaline Phosphatase 66 U/L      Total Bilirubin 0.4 mg/dL      Globulin 3.1 gm/dL      A/G Ratio 1.3 g/dL      BUN/Creatinine Ratio 15.2     Anion Gap 9.4 mmol/L      eGFR 101.8 mL/min/1.73     Narrative:      GFR Normal >60  Chronic Kidney Disease <60  Kidney Failure <15      Single High Sensitivity Troponin T [[id number]]  (Abnormal) Collected: 08/21/24 1619    Specimen: Blood Updated: 08/21/24 1654     HS Troponin T 25 ng/L     Narrative:      High  Sensitive Troponin T Reference Range:  <14.0 ng/L- Negative Female for AMI  <22.0 ng/L- Negative Male for AMI  >=14 - Abnormal Female indicating possible myocardial injury.  >=22 - Abnormal Male indicating possible myocardial injury.   Clinicians would have to utilize clinical acumen, EKG, Troponin, and serial changes to determine if it is an Acute Myocardial Infarction or myocardial injury due to an underlying chronic condition.         Magnesium [[id number]]  (Normal) Collected: 08/21/24 1619    Specimen: Blood Updated: 08/21/24 1659     Magnesium 1.8 mg/dL     CBC Auto Differential [[id number]]  (Abnormal) Collected: 08/21/24 1619    Specimen: Blood Updated: 08/21/24 1628     WBC 9.55 10*3/mm3      RBC 4.98 10*6/mm3      Hemoglobin 14.2 g/dL      Hematocrit 45.4 %      MCV 91.2 fL      MCH 28.5 pg      MCHC 31.3 g/dL      RDW 13.9 %      RDW-SD 46.4 fl      MPV 9.3 fL      Platelets 145 10*3/mm3      Neutrophil % 87.5 %      Lymphocyte % 5.1 %      Monocyte % 6.7 %      Eosinophil % 0.3 %      Basophil % 0.1 %      Immature Grans % 0.3 %      Neutrophils, Absolute 8.35 10*3/mm3      Lymphocytes, Absolute 0.49 10*3/mm3      Monocytes, Absolute 0.64 10*3/mm3      Eosinophils, Absolute 0.03 10*3/mm3      Basophils, Absolute 0.01 10*3/mm3      Immature Grans, Absolute 0.03 10*3/mm3      nRBC 0.0 /100 WBC     Urinalysis With Microscopic If Indicated (No Culture) - Urine, Clean Catch [[id number]]  (Normal) Collected: 08/21/24 1800    Specimen: Urine, Clean Catch Updated: 08/21/24 1807     Color, UA Yellow     Appearance, UA Clear     pH, UA 7.0     Specific Gravity, UA 1.008     Glucose, UA Negative     Ketones, UA Negative     Bilirubin, UA Negative     Blood, UA Negative     Protein, UA Negative     Leuk Esterase, UA Negative     Nitrite, UA Negative     Urobilinogen, UA 0.2 E.U./dL    Narrative:      Urine microscopic not indicated.    Lactic Acid, Plasma [[id number]]  (Normal) Collected: 08/21/24 1803    Specimen:  Blood Updated: 08/21/24 1841     Lactate 1.1 mmol/L     POC Chem Panel [[id number]]  (Abnormal) Collected: 08/21/24 1828    Specimen: Arterial Blood Updated: 08/21/24 1831     Glucose 138 mg/dL      Comment: Serial Number: [id number]:  [id number]        Sodium 137 mmol/L      POC Potassium 4.1 mmol/L      Ionized Calcium 1.21 mmol/L      Chloride 97 mmol/L      Creatinine 0.41 mg/dL      BUN 11 mg/dL      CO2 Content 31.4 mmol/L     Blood Gas, Arterial - [[id number]]  (Abnormal) Collected: 08/21/24 1828    Specimen: Arterial Blood Updated: 08/21/24 1831     Site Left Brachial     Shane's Test Positive     pH, Arterial 7.431 pH units      pCO2, Arterial 46.9 mm Hg      pO2, Arterial 73.5 mm Hg      HCO3, Arterial 31.2 mmol/L      Base Excess, Arterial 5.8 mmol/L      Comment: Serial Number: [id number]:  [id number]        O2 Saturation, Arterial 94.8 %      Hemoglobin, Blood Gas 15.0 g/dL      Hematocrit, Blood Gas 44.0 %      Barometric Pressure for Blood Gas 751.0000 mmHg      Modality Cannula     FIO2 28 %      Hemodilution No     PO2/FIO2 263    POC Lactate [[id number]]  (Normal) Collected: 08/21/24 1828    Specimen: Arterial Blood Updated: 08/21/24 1831     Lactate 1.0 mmol/L      Comment: Serial Number: [id number]:  [id number]                Imaging:    CT Head Without Contrast    Result Date: 8/21/2024  CT HEAD WO CONTRAST Date of Exam: 8/21/2024 8:35 PM EDT Indication: Headache headache. Comparison: 6/13/2024 Technique: Axial CT images were obtained of the head without contrast administration.  Reconstructed coronal and sagittal images were also obtained. Automated exposure control and iterative construction methods were used. Findings: Gray-white matter differentiation is maintained without evidence of an acute infarction. No intracranial mass or mass effect. No extra-axial mass or collection. The ventricles and sulci are normal in size and configuration. The posterior fossa appears normal. Sellar and suprasellar  structures are normal. Orbital and paranasal soft tissues are normal. The paranasal sinuses, ethmoid air cells, and mastoid air cells are aerated. The bony calvarium appears intact. No acute fractures. No lytic or blastic bony diseases.     Impression: No acute intracranial pathology. Electronically Signed: Sony Escobedo MD  8/21/2024 8:39 PM EDT  Workstation ID: [id number]    XR Chest 1 View    Result Date: 8/21/2024  XR CHEST 1 VW Date of Exam: 8/21/2024 4:51 PM EDT Indication: Weak/Dizzy/AMS triage protocol Comparison: 7/30/2024 Findings: No consolidation. No pneumothorax or pleural effusion. Left-sided infusion port with the tip in the superior vena cava. The clavicles are intact. No rib fractures. The visualized upper abdomen is normal.     No acute cardiopulmonary disease. Electronically Signed: Sony Escobedo MD  8/21/2024 5:10 PM EDT  Workstation ID: [id number]       Differential Diagnosis and Discussion:    Altered Mental Status: Based on the patient's signs and symptoms, differential diagnosis includes but is not limited to meningitis, stroke, sepsis, subarachnoid hemorrhage, intracranial bleeding, encephalitis, and metabolic encephalopathy.    All labs were reviewed and interpreted by me.  All X-rays impressions were independently interpreted by me.  CT scan radiology impression was interpreted by me.    MDM     The patient´s CBC that was reviewed and interpreted by me shows no abnormalities of critical concern. Of note, there is no anemia requiring a blood transfusion and the platelet count is acceptable.  The patient´s CMP that was reviewed and interpretted by me shows no abnormalities of critical concern. Of note, the patient´s sodium and potassium are acceptable. The patient´s liver enzymes are unremarkable. The patient´s renal function (creatinine) is preserved. The patient has a normal anion gap.  Urinalysis is negative for bacteriuria.  ABG shows a pH of 7.4 and a pCO2 of 46.9.  Patient was given Narcan  in the emergency department and did have a response and was no longer hypoxic.    Patient presents with an overdose. The patient was given Narcan for fear of worsening rapid decline. Patient was placed on the cardiac monitor and was repeatedly assessed.  Patient continually assessed for mental status decline and changes in neurological exam. In addition, the patient was monitored for ventricular ectopy, arrhythmia, tachycardia, hypoxia, changes in blood pressure several times throughout their stay in the emergency department.     Total Critical Care time of 45 minutes. Total critical care time documented does not include time spent on separately billed procedures for services of nurses or physician assistants. I personally saw and examined the patient. I have reviewed all diagnostic interpretations and treatment plans as written. I was present for the key portions of any procedures performed and the inclusive time noted in any critical care statement. Critical care time includes patient management by me, time spent at the patients bedside,  time to review lab and imaging results, discussing patient care, documentation in the medical record, and time spent with family or caregiver.         Patient Care Considerations:    None      Consultants/Shared Management Plan:    Case was discussed with Dr. Peterson who agrees with admission.    Social Determinants of Health:    Patient is independent, reliable, and has access to care.       Disposition and Care Coordination:    Admit:   Through independent evaluation of the patient's history, physical, and imperical data, the patient meets criteria for inpatient admission to the hospital.        Final diagnoses:   Opioid overdose, undetermined intent, initial encounter        ED Disposition       ED Disposition   Intended Admit    Condition   --    Comment   --               This medical record created using voice recognition software.             Makenzie Xie,  MD  08/21/24 2118

## 2024-08-21 NOTE — ED NOTES
Pt sleeping between care. Requires repeated, vigorous stimulation to wake. Once awake, pt alert and oriented x 4.

## 2024-08-21 NOTE — OUTREACH NOTE
AMBULATORY CASE MANAGEMENT NOTE    Names and Relationships of Patient/Support Persons: Contact: Sheila Watson; Relationship: Emergency Contact -     Attempted to meet with patient in office. Patient became minimally responsive and had HR in 130's. Was evaluated and sent to ER per Dr Wright. RN will follow up.   This RN floyd dpatient daughter Sheila and provided update of status. Sheila stated she will be heading to hospital to meet with patient. This Rn provided update to Dr Killian.   Education Documentation  No documentation found.        Alejandrina BARBA  Ambulatory Case Management    8/21/2024, 16:16 EDT

## 2024-08-22 ENCOUNTER — APPOINTMENT (OUTPATIENT)
Dept: CT IMAGING | Facility: HOSPITAL | Age: 59
DRG: 177 | End: 2024-08-22
Payer: COMMERCIAL

## 2024-08-22 LAB
ANION GAP SERPL CALCULATED.3IONS-SCNC: 9.7 MMOL/L (ref 5–15)
BASOPHILS # BLD AUTO: 0.01 10*3/MM3 (ref 0–0.2)
BASOPHILS NFR BLD AUTO: 0.1 % (ref 0–1.5)
BUN SERPL-MCNC: 11 MG/DL (ref 6–20)
BUN/CREAT SERPL: 23.9 (ref 7–25)
CALCIUM SPEC-SCNC: 9 MG/DL (ref 8.6–10.5)
CHLORIDE SERPL-SCNC: 100 MMOL/L (ref 98–107)
CO2 SERPL-SCNC: 28.3 MMOL/L (ref 22–29)
CORTIS SERPL-MCNC: 13.66 MCG/DL
CREAT SERPL-MCNC: 0.46 MG/DL (ref 0.57–1)
DEPRECATED RDW RBC AUTO: 47.2 FL (ref 37–54)
EGFRCR SERPLBLD CKD-EPI 2021: 111.1 ML/MIN/1.73
EOSINOPHIL # BLD AUTO: 0.04 10*3/MM3 (ref 0–0.4)
EOSINOPHIL NFR BLD AUTO: 0.6 % (ref 0.3–6.2)
ERYTHROCYTE [DISTWIDTH] IN BLOOD BY AUTOMATED COUNT: 13.8 % (ref 12.3–15.4)
FLUAV SUBTYP SPEC NAA+PROBE: NOT DETECTED
FLUBV RNA ISLT QL NAA+PROBE: NOT DETECTED
GEN 5 2HR TROPONIN T REFLEX: 26 NG/L
GLUCOSE SERPL-MCNC: 105 MG/DL (ref 65–99)
HCT VFR BLD AUTO: 41.3 % (ref 34–46.6)
HGB BLD-MCNC: 12.8 G/DL (ref 12–15.9)
IMM GRANULOCYTES # BLD AUTO: 0.03 10*3/MM3 (ref 0–0.05)
IMM GRANULOCYTES NFR BLD AUTO: 0.4 % (ref 0–0.5)
LYMPHOCYTES # BLD AUTO: 0.47 10*3/MM3 (ref 0.7–3.1)
LYMPHOCYTES NFR BLD AUTO: 6.9 % (ref 19.6–45.3)
MCH RBC QN AUTO: 28.7 PG (ref 26.6–33)
MCHC RBC AUTO-ENTMCNC: 31 G/DL (ref 31.5–35.7)
MCV RBC AUTO: 92.6 FL (ref 79–97)
MONOCYTES # BLD AUTO: 0.54 10*3/MM3 (ref 0.1–0.9)
MONOCYTES NFR BLD AUTO: 7.9 % (ref 5–12)
NEUTROPHILS NFR BLD AUTO: 5.74 10*3/MM3 (ref 1.7–7)
NEUTROPHILS NFR BLD AUTO: 84.1 % (ref 42.7–76)
NRBC BLD AUTO-RTO: 0 /100 WBC (ref 0–0.2)
PLATELET # BLD AUTO: 141 10*3/MM3 (ref 140–450)
PMV BLD AUTO: 10.2 FL (ref 6–12)
POTASSIUM SERPL-SCNC: 4 MMOL/L (ref 3.5–5.2)
RBC # BLD AUTO: 4.46 10*6/MM3 (ref 3.77–5.28)
RSV RNA NPH QL NAA+NON-PROBE: NOT DETECTED
SARS-COV-2 RNA RESP QL NAA+PROBE: NOT DETECTED
SODIUM SERPL-SCNC: 138 MMOL/L (ref 136–145)
TROPONIN T DELTA: -3 NG/L
WBC NRBC COR # BLD AUTO: 6.83 10*3/MM3 (ref 3.4–10.8)

## 2024-08-22 PROCEDURE — 82533 TOTAL CORTISOL: CPT | Performed by: STUDENT IN AN ORGANIZED HEALTH CARE EDUCATION/TRAINING PROGRAM

## 2024-08-22 PROCEDURE — 25010000002 CEFEPIME PER 500 MG: Performed by: STUDENT IN AN ORGANIZED HEALTH CARE EDUCATION/TRAINING PROGRAM

## 2024-08-22 PROCEDURE — 71250 CT THORAX DX C-: CPT

## 2024-08-22 PROCEDURE — 85025 COMPLETE CBC W/AUTO DIFF WBC: CPT | Performed by: STUDENT IN AN ORGANIZED HEALTH CARE EDUCATION/TRAINING PROGRAM

## 2024-08-22 PROCEDURE — 87637 SARSCOV2&INF A&B&RSV AMP PRB: CPT | Performed by: STUDENT IN AN ORGANIZED HEALTH CARE EDUCATION/TRAINING PROGRAM

## 2024-08-22 PROCEDURE — 94640 AIRWAY INHALATION TREATMENT: CPT

## 2024-08-22 PROCEDURE — 25010000002 ONDANSETRON PER 1 MG: Performed by: STUDENT IN AN ORGANIZED HEALTH CARE EDUCATION/TRAINING PROGRAM

## 2024-08-22 PROCEDURE — 25810000003 LACTATED RINGERS PER 1000 ML: Performed by: STUDENT IN AN ORGANIZED HEALTH CARE EDUCATION/TRAINING PROGRAM

## 2024-08-22 PROCEDURE — 94761 N-INVAS EAR/PLS OXIMETRY MLT: CPT

## 2024-08-22 PROCEDURE — 80307 DRUG TEST PRSMV CHEM ANLYZR: CPT | Performed by: INTERNAL MEDICINE

## 2024-08-22 PROCEDURE — 92610 EVALUATE SWALLOWING FUNCTION: CPT

## 2024-08-22 PROCEDURE — 94799 UNLISTED PULMONARY SVC/PX: CPT

## 2024-08-22 PROCEDURE — 25810000003 SODIUM CHLORIDE 0.9 % SOLUTION: Performed by: STUDENT IN AN ORGANIZED HEALTH CARE EDUCATION/TRAINING PROGRAM

## 2024-08-22 PROCEDURE — 99233 SBSQ HOSP IP/OBS HIGH 50: CPT | Performed by: INTERNAL MEDICINE

## 2024-08-22 PROCEDURE — 25010000002 DIPHENHYDRAMINE PER 50 MG: Performed by: PHYSICIAN ASSISTANT

## 2024-08-22 PROCEDURE — 80048 BASIC METABOLIC PNL TOTAL CA: CPT | Performed by: STUDENT IN AN ORGANIZED HEALTH CARE EDUCATION/TRAINING PROGRAM

## 2024-08-22 PROCEDURE — 74176 CT ABD & PELVIS W/O CONTRAST: CPT

## 2024-08-22 PROCEDURE — 94664 DEMO&/EVAL PT USE INHALER: CPT

## 2024-08-22 PROCEDURE — 25010000002 VANCOMYCIN 5 G RECONSTITUTED SOLUTION: Performed by: STUDENT IN AN ORGANIZED HEALTH CARE EDUCATION/TRAINING PROGRAM

## 2024-08-22 PROCEDURE — 84484 ASSAY OF TROPONIN QUANT: CPT | Performed by: STUDENT IN AN ORGANIZED HEALTH CARE EDUCATION/TRAINING PROGRAM

## 2024-08-22 RX ORDER — CALCIUM CARBONATE 500 MG/1
2 TABLET, CHEWABLE ORAL 3 TIMES DAILY PRN
Status: DISCONTINUED | OUTPATIENT
Start: 2024-08-22 | End: 2024-08-25 | Stop reason: HOSPADM

## 2024-08-22 RX ORDER — NICOTINE 21 MG/24HR
1 PATCH, TRANSDERMAL 24 HOURS TRANSDERMAL
Status: DISCONTINUED | OUTPATIENT
Start: 2024-08-22 | End: 2024-08-25 | Stop reason: HOSPADM

## 2024-08-22 RX ORDER — DIPHENHYDRAMINE HYDROCHLORIDE 50 MG/ML
25 INJECTION INTRAMUSCULAR; INTRAVENOUS EVERY 6 HOURS PRN
Status: DISCONTINUED | OUTPATIENT
Start: 2024-08-22 | End: 2024-08-25 | Stop reason: HOSPADM

## 2024-08-22 RX ORDER — HYDROCODONE BITARTRATE AND ACETAMINOPHEN 5; 325 MG/1; MG/1
1 TABLET ORAL EVERY 6 HOURS PRN
Status: DISCONTINUED | OUTPATIENT
Start: 2024-08-22 | End: 2024-08-24

## 2024-08-22 RX ORDER — PREDNISONE 10 MG/1
10 TABLET ORAL DAILY
Status: DISCONTINUED | OUTPATIENT
Start: 2024-08-22 | End: 2024-08-25 | Stop reason: HOSPADM

## 2024-08-22 RX ORDER — MINERAL OIL/HYDROPHIL PETROLAT
1 OINTMENT (GRAM) TOPICAL
Status: DISCONTINUED | OUTPATIENT
Start: 2024-08-23 | End: 2024-08-25 | Stop reason: HOSPADM

## 2024-08-22 RX ORDER — CLOPIDOGREL BISULFATE 75 MG/1
75 TABLET ORAL DAILY
Status: DISCONTINUED | OUTPATIENT
Start: 2024-08-22 | End: 2024-08-25 | Stop reason: HOSPADM

## 2024-08-22 RX ADMIN — CEFEPIME 2000 MG: 2 INJECTION, POWDER, FOR SOLUTION INTRAVENOUS at 11:30

## 2024-08-22 RX ADMIN — IPRATROPIUM BROMIDE AND ALBUTEROL SULFATE 3 ML: .5; 3 SOLUTION RESPIRATORY (INHALATION) at 12:17

## 2024-08-22 RX ADMIN — IPRATROPIUM BROMIDE AND ALBUTEROL SULFATE 3 ML: .5; 3 SOLUTION RESPIRATORY (INHALATION) at 00:44

## 2024-08-22 RX ADMIN — VANCOMYCIN HYDROCHLORIDE 1250 MG: 5 INJECTION, POWDER, LYOPHILIZED, FOR SOLUTION INTRAVENOUS at 01:26

## 2024-08-22 RX ADMIN — BUDESONIDE 0.5 MG: 0.5 INHALANT ORAL at 00:44

## 2024-08-22 RX ADMIN — SODIUM CHLORIDE, POTASSIUM CHLORIDE, SODIUM LACTATE AND CALCIUM CHLORIDE 100 ML/HR: 600; 310; 30; 20 INJECTION, SOLUTION INTRAVENOUS at 01:16

## 2024-08-22 RX ADMIN — NICOTINE 1 PATCH: 21 PATCH, EXTENDED RELEASE TRANSDERMAL at 13:15

## 2024-08-22 RX ADMIN — ONDANSETRON 4 MG: 2 INJECTION INTRAMUSCULAR; INTRAVENOUS at 21:31

## 2024-08-22 RX ADMIN — SODIUM CHLORIDE, POTASSIUM CHLORIDE, SODIUM LACTATE AND CALCIUM CHLORIDE 100 ML/HR: 600; 310; 30; 20 INJECTION, SOLUTION INTRAVENOUS at 11:48

## 2024-08-22 RX ADMIN — CEFEPIME 2000 MG: 2 INJECTION, POWDER, FOR SOLUTION INTRAVENOUS at 03:22

## 2024-08-22 RX ADMIN — CLOPIDOGREL BISULFATE 75 MG: 75 TABLET ORAL at 09:55

## 2024-08-22 RX ADMIN — HYDROCODONE BITARTRATE AND ACETAMINOPHEN 1 TABLET: 5; 325 TABLET ORAL at 16:54

## 2024-08-22 RX ADMIN — DIPHENHYDRAMINE HYDROCHLORIDE 25 MG: 50 INJECTION, SOLUTION INTRAMUSCULAR; INTRAVENOUS at 02:07

## 2024-08-22 RX ADMIN — Medication 10 ML: at 10:00

## 2024-08-22 RX ADMIN — CEFEPIME 2000 MG: 2 INJECTION, POWDER, FOR SOLUTION INTRAVENOUS at 19:38

## 2024-08-22 RX ADMIN — Medication 10 ML: at 01:19

## 2024-08-22 RX ADMIN — IPRATROPIUM BROMIDE AND ALBUTEROL SULFATE 3 ML: .5; 3 SOLUTION RESPIRATORY (INHALATION) at 07:34

## 2024-08-22 RX ADMIN — Medication 10 ML: at 21:30

## 2024-08-22 RX ADMIN — ACETAMINOPHEN 650 MG: 325 TABLET ORAL at 11:29

## 2024-08-22 RX ADMIN — CALCIUM CARBONATE 2 TABLET: 500 TABLET, CHEWABLE ORAL at 22:56

## 2024-08-22 RX ADMIN — BUDESONIDE 0.5 MG: 0.5 INHALANT ORAL at 07:34

## 2024-08-22 NOTE — H&P
[facility] HISTORY AND PHYSICAL  Date: 2024   Patient Name: Isha Llanes  : 1965  MRN: [id number]  Primary Care Physician:  Katia Wright MD  Date of admission: 2024    Subjective   Subjective     Chief Complaint: Altered mental status    HPI:    Isha Llanes is a 58 y.o. female with past medical history of SLE, Hodgkin's lymphoma, type 2 diabetes, GERD, aortic valve replacement, squamous cell carcinoma and CHF presents to the ED due to altered mental status.  As per daughter, patient has an complaining of abdominal pain, nausea and worsening mental status throughout the day.  In addition patient has been having chills.  Patient states this type of symptoms happens to her before.  Patient was recently discharged on  for hypoxemic respiratory failure and acute encephalopathy.  In the ED, labs show temperature 99.8, heart rate 118 and blood pressure 119/53.  ABG shows pH of 7.4, pCO2 46.9, pO2 73.5.  Labs show sodium 137, creatinine 0.41, lactic 1.0, white blood cell 9.5 and hemoglobin 14.2.  X-ray shows no acute findings.  CT head was unremarkable.  Patient admitted to floors for further management.    Personal History     Past Medical History:  Past Medical History:   Diagnosis Date   • Anesthesia     REPORTS HAS GOT REAL EMOTIONAL AND HAS BECOME ANGRY BEFORE   • Anxiety    • Aortic stenosis, severe     HAS BIO VALVE REPLACED   • Arthritis    • Asthma    • Back pain    • Blood clotting disorder    • Cancer    • Cancer associated pain     HODGKINS LYMPHOMA   • CHF (congestive heart failure)    • Chronic low back pain with sciatica    • Chronic pain disorder    • COPD (chronic obstructive pulmonary disease)    • Coronary artery disease    • Diabetes mellitus     TYPE 2   • Diabetes mellitus    • Dyspnea on effort    • GERD (gastroesophageal reflux disease)    • H/O lumpectomy    • H/O: hysterectomy    • Hiatal hernia    • History of degenerative disc disease     • History of kidney stones    • History of pulmonary embolus (PE)    • History of transfusion     AS A CHILD NO TRANSFUSION REACTION   • History of transfusion    • Hodgkin's lymphoma     5/19/23  5 YEARS SINCE LAST CHEMO TX   • Hypertension    • Immobility    • Liver disease     DENIES ANY CURRENT ISSUES   • Lupus    • Mitral valve prolapse    • Nausea vomiting and diarrhea 3/7/2024   • Panic disorder    • Pelvic pain    • Peripheral neuropathy    • Personal history of DVT (deep vein thrombosis)    • Presence of intrathecal pump    • PTSD (post-traumatic stress disorder)    • Sacroiliac joint disease    • SI (sacroiliac) joint inflammation    • Sleep apnea    • Venous insufficiency        Past Surgical History:  Past Surgical History:   Procedure Laterality Date   • ABDOMINAL SURGERY     • BACK SURGERY      SPINAL FUSION    • BACK SURGERY     • BLADDER REPAIR     • CARDIAC CATHETERIZATION N/A 03/06/2019    Procedure: Valvuloplasty;  Surgeon: Wayne Johnson MD;  Location: [facility] INVASIVE LOCATION;  Service: Cardiology   • CARDIAC CATHETERIZATION     • CARDIAC CATHETERIZATION Left 5/31/2023    Procedure: Cardiac Catheterization/Vascular Study;  Surgeon: Poncho Reid MD;  Location: [facility] CATH INVASIVE LOCATION;  Service: Cardiovascular;  Laterality: Left;   • CARDIAC SURGERY     • CARDIAC VALVE REPLACEMENT  2021   • CHOLECYSTECTOMY     • COLONOSCOPY N/A 12/29/2021    Procedure: COLONOSCOPY;  Surgeon: Karine Orlando MD;  Location: [facility] ENDOSCOPY;  Service: Gastroenterology;  Laterality: N/A;  POOR PREP   • COLONOSCOPY N/A 04/13/2022    Procedure: COLONOSCOPY WITH POLYPECTOMY;  Surgeon: Karine Orlando MD;  Location: [facility] ENDOSCOPY;  Service: Gastroenterology;  Laterality: N/A;  COLON POLYP, HEMORRHOIDS, POOR PREP   • COLONOSCOPY     • DIRECT LARYNGOSCOPY, ESOPHAGOSCOPY, BRONCHOSCOPY N/A 5/22/2023    Procedure: DIRECT LARYNGOSCOPY WITH BIOPSIES , ESOPHAGOSCOPY,  BRONCHOSCOPY;  Surgeon: Ronnie Mcgarry MD;  Location: [facility] OR OSC;  Service: ENT;  Laterality: N/A;   • ENDOSCOPY N/A 12/29/2021    Procedure: ESOPHAGOGASTRODUODENOSCOPY;  Surgeon: Karine Orlando MD;  Location: [facility] ENDOSCOPY;  Service: Gastroenterology;  Laterality: N/A;  ESOPHAGITIS, GASTRITIS, HIATAL HERNIA   • ENDOSCOPY     • ENDOSCOPY N/A 4/16/2024    Procedure: ESOPHAGOGASTRODUODENOSCOPY WITH BIOPSIES;  Surgeon: Karine Orlando MD;  Location: [facility] ENDOSCOPY;  Service: Gastroenterology;  Laterality: N/A;  ESOPHAGITIS, HIATAL HERNIA   • HYSTERECTOMY     • LYMPHADENECTOMY     • PAIN PUMP INSERTION/REVISION N/A 10/18/2019    Procedure: PAIN PUMP INSERTION [facility];  Surgeon: Horace Rios MD;  Location: [facility];  Service: Pain Management   • PAIN PUMP INSERTION/REVISION N/A 11/23/2020    Procedure: pain pump removal;  Surgeon: Horace Rios MD;  Location: [facility];  Service: Pain Management;  Laterality: N/A;   • PEG TUBE INSERTION N/A 7/26/2023    Procedure: PERCUTANEOUS ENDOSCOPIC GASTROSTOMY TUBE INSERTION;  Surgeon: Kody Mercer MD;  Location: [facility] ENDOSCOPY;  Service: General;  Laterality: N/A;  SUCCESSFUL PEG TUBE PLACE PLACEMENT   • PORTACATH PLACEMENT      IN LEFT CHEST REPORTS THAT IT IS NOT FUNCTIONING   • SKIN BIOPSY     • TONSILLECTOMY     • TUBAL ABDOMINAL LIGATION     • TUMOR REMOVAL         Family History:   Family History   Problem Relation Age of Onset   • Heart failure Mother    • Anxiety disorder Mother    • Prostate cancer Father    • Depression Sister    • Bipolar disorder Sister    • Pancreatic cancer Sister    • ADD / ADHD Brother    • Thyroid cancer Maternal Grandmother    • Pancreatic cancer Paternal Grandmother    • ADD / ADHD Grandson    • ADD / ADHD Granddaughter    • ADD / ADHD Nephew    • Malig Hyperthermia Neg Hx        Social History:   Social History     Socioeconomic History   • Marital status: Legally     Tobacco Use   • Smoking status: Every Day     Current packs/day: 1.50     Average packs/day: 1.5 packs/day for 45.6 years (68.5 ttl pk-yrs)     Types: Cigarettes     Start date: 1979     Passive exposure: Current   • Smokeless tobacco: Never   Vaping Use   • Vaping status: Never Used   Substance and Sexual Activity   • Alcohol use: Never   • Drug use: Never   • Sexual activity: Not Currently       Home Medications:  ALPRAZolam, O2, acetaminophen, albuterol, albuterol sulfate HFA, ciprofloxacin, clopidogrel, furosemide, mupirocin, nicotine, ondansetron ODT, pain, and predniSONE    Allergies:  Allergies   Allergen Reactions   • Amoxicillin Shortness Of Breath     Has tolerated Cefazolin, Ceftriax[facility], and [facility]   • Ceclor [Cefaclor] Shortness Of Breath     Has tolerated Cefazolin, Ceftriaxone, and Cefepime -Jermaine Melida, RPH     • Penicillins Shortness Of Breath     Has tolerated Cefazolin, Ceftriaxone, and [facility]pime [facility]   • Sulfa Antibiotics Rash   • Valium [Diazepam] Mental Status Change     DEPRESSED   • Ambien [Zolpidem] Mental Status Change   • Aspirin GI Intolerance   • Ativan [Lorazepam] Mental Status Change   • Benadryl [Diphenhydramine] Anxiety     AND MAKES HER HYPER   • Biaxin [Clarithromycin] Unknown - Low Severity   • Cephalexin Unknown - Low Severity   • Clindamycin Unknown - Low Severity   • Compazine [Prochlorperazine Edisylate] Rash   • Contrast Dye (Echo Or Unknown Ct/Mr) Other (See Comments)     Caused pain in her arm   • Doxycycline Rash   • Nsaids GI Intolerance   • Phenergan [Promethazine Hcl] GI Intolerance   • Promethazine GI Intolerance   • Vancomycin Itching       Review of Systems   All systems were reviewed and negative except for above    Objective   Objective     Vitals:   Temp:  [98 °F (36.7 °C)-99.8 °F (37.7 °C)] 99.8 °F (37.7 °C)  Heart Rate:  [104-132] 121  Resp:  [18] 18  BP: ()/(57-83) 94/67    Physical  Exam    Constitutional: Awake, alert, no acute distress   Eyes: Pupils equal, sclerae anicteric, no conjunctival injection   HENT: NCAT, mucous membranes moist   Neck: Supple, no thyromegaly, no lymphadenopathy, trachea midline   Respiratory: Tachypneic, nonlabored respirations    Cardiovascular: Tachycardic, no murmurs, rubs, or gallops, palpable pedal pulses bilaterally   Gastrointestinal: Abdominal tenderness on palpation   Musculoskeletal: No bilateral ankle edema, no clubbing or cyanosis to extremities   Psychiatric: Appropriate affect, cooperative   Neurologic: Oriented x 3, strength symmetric in all extremities, Cranial Nerves grossly intact to confrontation, speech clear   Skin: No rashes     Result Review    Result Review:  I have personally reviewed the results from the time of this admission to 8/21/2024 20:23 EDT and agree with these findings:  [x]  Laboratory  []  Microbiology  []  Radiology  []  EKG/Telemetry   [x]  Cardiology/Vascular   []  Pathology  []  Old records  []  Other:      Assessment & Plan   Assessment / Plan     Assessment/Plan:     Assessment  Acute encephalopathy concern for infection versus medication induced  History of 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  SLE on prednisone  History of COPD  Chronic pain on Dilaudid pump history of anxiety  Essential hypertension  History of CAD  Type 2 diabetes  History of aortic stenosis status post TAVR  History of Hodgkin's lymphoma    Plan   Admit to [facility]  Telemetry  Monitor vitals  CBC BMP  Troponin trend  TSH and cortisol levels ordered  Blood cultures  Chest reviewed  CT chest ordered  CT abdomen pelvis ordered  DuoNebs Pulmicort  Plavix   continue home prednisone  IV Levaquin and Vanco  Fluids  Speech therapy consulted    VTE Prophylaxis:  No VTE prophylaxis order currently exists.        CODE STATUS:         Admission Status:  I believe this patient meets inpatient status.    Electronically signed  by Jesus Alberto Peterson MD, 08/21/24, 8:23 PM EDT.

## 2024-08-22 NOTE — PLAN OF CARE
Goal Outcome Evaluation:  Plan of Care Reviewed With: patient, daughter        Progress: no change  Outcome Evaluation: Admitted from ED last night.  Patient is requiring 2L NC currently.  Patient does have implanted pain pump, PA and pharmacy were notified and aware.  Patient was disoriented to time and intermittent confusion.  Very drowsy.  Attempted Vancomycin administration and patient reported itching, PA notified and benedryl was given.  Plan was to continue Vanc after benedryl but patient refused.  Daughter answered admission questions due to patient being so drowsy.  LR is currently running at 100mL/hr.

## 2024-08-22 NOTE — PROGRESS NOTES
"Chief Complaint  Vomiting and Headache    Subjective      History of Present Illness    Isha Llanes is a 58 y.o. female who presents to [facility]   Patient presented to be seen for an acute visit however she was difficult to arouse her heart rate was in 130s ambulance was called.      Objective   Vital Signs:   Vitals:    08/21/24 1421   BP: 104/60   Pulse: (!) 132   Temp: 98 °F (36.7 °C)   SpO2: 93%   Weight: 60.3 kg (133 lb)   Height: 162.6 cm (64\")     Body mass index is 22.83 kg/m².    Wt Readings from Last 3 Encounters:   08/21/24 65.7 kg (144 lb 13.5 oz)   08/21/24 60.3 kg (133 lb)   08/07/24 60.3 kg (133 lb)     BP Readings from Last 3 Encounters:   08/22/24 120/61   08/21/24 104/60   08/07/24 124/74       Health Maintenance   Topic Date Due    COVID-19 Vaccine (1) Never done    DIABETIC EYE EXAM  Never done    ZOSTER VACCINE (1 of 2) Never done    LIPID PANEL  01/12/2024    HEMOGLOBIN A1C  05/29/2024    DIABETIC FOOT EXAM  08/29/2024 (Originally 3/29/2017)    Hepatitis B (1 of 3 - 19+ 3-dose series) 02/19/2025 (Originally 12/4/1984)    Pneumococcal Vaccine 0-64 (1 of 2 - PCV) 02/19/2025 (Originally 12/4/1971)    MAMMOGRAM  02/19/2025 (Originally 1/8/2021)    TDAP/TD VACCINES (1 - Tdap) 02/19/2025 (Originally 12/4/1984)    INFLUENZA VACCINE  03/31/2025 (Originally 8/1/2024)    URINE MICROALBUMIN  02/19/2025    ANNUAL PHYSICAL  05/23/2025    LUNG CANCER SCREENING  07/20/2025    COLORECTAL CANCER SCREENING  04/13/2032    HEPATITIS C SCREENING  Completed    PAP SMEAR  Discontinued       Physical Exam   General: Patient was difficult to arouse however she was AAO x3, no acute distress.  Eyes:  EOMI, PERRLA  Ears/Nose/Mouth/Throat:  Moist mucous membranes  Respiratory:  CTA bilaterally, no wheezes rales or rhonchi  Cardiovascular: Tachycardic rate no murmur rubs or gallops  Gastrointestinal:  Abdomen soft nondistended nontender bowel sounds present x4 " quadrants  Musculoskeletal:  Moves all 4 extremities spontaneously, no apparent weakness  Skin:  Warm, dry, no skin rashes or lesions  Neurologic: Difficult to arouse   psychiatric: Depressed mood t    Result Review :     The following data was reviewed by: Katia Wright MD on 08/21/2024:      Procedures          Diagnoses and all orders for this visit:    1. Urinary pain (Primary)  -     POCT urinalysis dipstick, manual    2. Tachycardia with heart rate 121-140 beats per minute    3. Painful urination         BMI is within normal parameters. No other follow-up for BMI required.     Patient sent to the hospital via ambulance    FOLLOW UP  No follow-ups on file.  Patient was given instructions and counseling regarding her condition or for health maintenance advice. Please see specific information pulled into the AVS if appropriate.       Katia Wright MD  08/22/24  07:55 EDT    CURRENT & DISCONTINUED MEDICATIONS  Current Outpatient Medications   Medication Instructions    acetaminophen (TYLENOL) 325 mg, Oral, Every 6 Hours PRN    albuterol (PROVENTIL) 2.5 mg, Nebulization, Every 4 Hours PRN    albuterol sulfate  (90 Base) MCG/ACT inhaler 2 puffs, Inhalation, Every 4 Hours PRN    ALPRAZolam (XANAX) 0.5 mg, Oral, Nightly PRN    ciprofloxacin (CIPRO) 500 MG tablet 1 tablet, Oral, Every 12 Hours Scheduled    clopidogrel (PLAVIX) 75 mg, Oral, Daily    furosemide (LASIX) 40 mg, Oral, Daily    mupirocin (BACTROBAN) 2 % ointment 1 Application, Topical, 3 Times Daily    nicotine (NICODERM CQ) 21 MG/24HR patch 1 patch, Transdermal, Every 24 Hours    O2 (OXYGEN) Inhalation, Once    ondansetron ODT (ZOFRAN-ODT) 4 mg, Translingual, Every 8 Hours PRN    pain patient supplied pump Intrathecal, Continuous, Type of Medication: Hydromorphone 15 mg/ml and Bupivacaine 0.5 mg/ml<BR>Provider:Dr. Boudreaux<BR>Provider number: [phone]<BR>Basal Dose: Hydromorphone 7.518 mg/day Bupivacaine 0.17720 mg/day<BR>Pump capacity: 40ml<BR>(  MAX of Hydromorphone 9.456 mg/ day; Bupivacaine 0.62920 mg /day)<BR>Bolus Dose:Hydromorphone 0.500 mg, Bupivacaine 0.22447 mg<BR>Max activations: 4 per day<BR>Lockout 3 hours. 1 Bolus per every 3 hours <BR>Next refill-  every 60 days 09/20/24 <BR>Alarm date- 09/27/24<BR>Pump manufacture:[employer]    predniSONE (DELTASONE) 10 mg, Oral, Daily       There are no discontinued medications.

## 2024-08-22 NOTE — THERAPY EVALUATION
Acute Care - Speech Language Pathology   Swallow Initial Evaluation JEMMA Go     Patient Name: Isha Llanes  : 1965  MRN: [id number]  Today's Date: 2024               Admit Date: 2024    Visit Dx:     ICD-10-CM ICD-9-CM   1. Opioid overdose, undetermined intent, initial encounter  T40.2X4A 965.00     E980.0   2. Oropharyngeal dysphagia  R13.12 787.22     Patient Active Problem List   Diagnosis    Septic shock    Acute MI    Cancer associated pain    Presence of intrathecal pump    Chronic low back pain with bilateral sciatica    Sacroiliac inflammation    Chronic pain syndrome    Pelvic pain    Aortic stenosis, severe    Dyspnea on effort    Other pulmonary embolism without acute cor pulmonale    Nonrheumatic aortic valve stenosis    Hip pain    Anxiety disorder    Allergic rhinitis due to allergen    Arthritis    Asthma    Kidney disease    CHF (congestive heart failure)    Chronic obstructive pulmonary disease    Diabetes mellitus, type 2    Depression    GERD (gastroesophageal reflux disease)    S/P TAVR (transcatheter aortic valve replacement)    Limited mobility    Venous hypertension of both lower extremities    Nicotine dependence    Mitral valve prolapse    Lupus (systemic lupus erythematosus)    Long term current use of anticoagulant therapy    History of Hodgkin's lymphoma    Hepatic steatosis    Heart murmur    CAD (coronary artery disease)    Non-healing wound of lower extremity, subsequent encounter    MSSA (methicillin susceptible Staphylococcus aureus) infection    Sepsis    Change in bowel habits    Nausea    Venous stasis ulcer of right calf    Venous stasis ulcer of left calf    Noncompliance    Peripheral vascular disease    Obesity with body mass index 30 or greater    Neurologic disorder    Neck pain    Limb pain    Hiatal hernia    Bladder disorder    Frailty    Cellulitis of right foot    Bowel disease    Atrophy of skin    Breast lump    Encounter for care related to  vascular access port    Primary osteoarthritis of right hip    Tobacco abuse    Laryngeal cancer    Throat pain    Voice hoarseness    Hodgkin lymphoma    Malignant neoplasm of supraglottis    Dysphagia    Nausea vomiting and diarrhea    Weight loss, abnormal    Epigastric pain    Nausea and vomiting    History of laryngeal cancer    Anorexia    AMS (altered mental status)    Acute-on-chronic respiratory failure    UTI (urinary tract infection)     Past Medical History:   Diagnosis Date    Anesthesia     REPORTS HAS GOT REAL EMOTIONAL AND HAS BECOME ANGRY BEFORE    Anxiety     Aortic stenosis, severe     HAS BIO VALVE REPLACED    Arthritis     Asthma     Back pain     Blood clotting disorder     Cancer     Cancer associated pain     HODGKINS LYMPHOMA    CHF (congestive heart failure)     Chronic low back pain with sciatica     Chronic pain disorder     COPD (chronic obstructive pulmonary disease)     Coronary artery disease     Diabetes mellitus     TYPE 2    Diabetes mellitus     Dyspnea on effort     GERD (gastroesophageal reflux disease)     H/O lumpectomy     H/O: hysterectomy     Hiatal hernia     History of degenerative disc disease     History of kidney stones     History of pulmonary embolus (PE)     History of transfusion     AS A CHILD NO TRANSFUSION REACTION    History of transfusion     Hodgkin's lymphoma     5/19/23  5 YEARS SINCE LAST CHEMO TX    Hypertension     Immobility     Liver disease     DENIES ANY CURRENT ISSUES    Lupus     Mitral valve prolapse     Nausea vomiting and diarrhea 3/7/2024    Panic disorder     Pelvic pain     Peripheral neuropathy     Personal history of DVT (deep vein thrombosis)     Presence of intrathecal pump     PTSD (post-traumatic stress disorder)     Sacroiliac joint disease     SI (sacroiliac) joint inflammation     Sleep apnea     Venous insufficiency      Past Surgical History:   Procedure Laterality Date    ABDOMINAL SURGERY      BACK SURGERY      SPINAL FUSION      BACK SURGERY      BLADDER REPAIR      CARDIAC CATHETERIZATION N/A 03/06/2019    Procedure: Valvuloplasty;  Surgeon: Wayne Johnson MD;  Location: [facility] CATH INVASIVE LOCATION;  Service: Cardiology    CARDIAC CATHETERIZATION      CARDIAC CATHETERIZATION Left 5/31/2023    Procedure: Cardiac Catheterization/Vascular Study;  Surgeon: Poncho Reid MD;  Location: [facility] CATH INVASIVE LOCATION;  Service: Cardiovascular;  Laterality: Left;    CARDIAC SURGERY      CARDIAC VALVE REPLACEMENT  2021    CHOLECYSTECTOMY      COLONOSCOPY N/A 12/29/2021    Procedure: COLONOSCOPY;  Surgeon: Karine Orlando MD;  Location: [facility] ENDOSCOPY;  Service: Gastroenterology;  Laterality: N/A;  POOR PREP    COLONOSCOPY N/A 04/13/2022    Procedure: COLONOSCOPY WITH POLYPECTOMY;  Surgeon: Karine Orlando MD;  Location: [facility] ENDOSCOPY;  Service: Gastroenterology;  Laterality: N/A;  COLON POLYP, HEMORRHOIDS, POOR PREP    COLONOSCOPY      DIRECT LARYNGOSCOPY, ESOPHAGOSCOPY, BRONCHOSCOPY N/A 5/22/2023    Procedure: DIRECT LARYNGOSCOPY WITH BIOPSIES , ESOPHAGOSCOPY, BRONCHOSCOPY;  Surgeon: Ronnie Mcgarry MD;  Location: [facility] OR [facility];  Service: ENT;  Laterality: N/A;    ENDOSCOPY N/A 12/29/2021    Procedure: ESOPHAGOGASTRODUODENOSCOPY;  Surgeon: Karine Orlando MD;  Location: [facility] ENDOSCOPY;  Service: Gastroenterology;  Laterality: N/A;  ESOPHAGITIS, GASTRITIS, HIATAL HERNIA    ENDOSCOPY      ENDOSCOPY N/A 4/16/2024    Procedure: ESOPHAGOGASTRODUODENOSCOPY WITH BIOPSIES;  Surgeon: Karine Orlando MD;  Location: [facility] ENDOSCOPY;  Service: Gastroenterology;  Laterality: N/A;  ESOPHAGITIS, HIATAL HERNIA    HYSTERECTOMY      LYMPHADENECTOMY      PAIN PUMP INSERTION/REVISION N/A 10/18/2019    Procedure: PAIN PUMP INSERTION [facility] 10-18-19 [place];  Surgeon: Horace Rios MD;  Location: [facility] OR;  Service: Pain Management    PAIN PUMP INSERTION/REVISION N/A 11/23/2020    Procedure:  pain pump removal;  Surgeon: Horace Rios MD;  [facility];  Service: Pain Management;  Laterality: N/A;    PEG TUBE INSERTION N/A 7/26/2023    Procedure: PERCUTANEOUS ENDOSCOPIC GASTROSTOMY TUBE INSERTION;  Surgeon: Kody Mercer MD;  Location: [facility] ENDOSCOPY;  Service: General;  Laterality: N/A;  SUCCESSFUL PEG TUBE PLACE PLACEMENT    PORTACATH PLACEMENT      IN LEFT CHEST REPORTS THAT IT IS NOT FUNCTIONING    SKIN BIOPSY      TONSILLECTOMY      TUBAL ABDOMINAL LIGATION      TUMOR REMOVAL             Inpatient Speech Pathology Dysphagia Evaluation        PAIN SCALE: Patient stating complaint of pain, did not rate, asking for medications, nursing aware    PRECAUTIONS/CONTRAINDICATIONS: Standard    SUSPECTED ABUSE/NEGLECT/EXPLOITATION: None indicated.    SOCIAL/PSYCHOLOGICAL NEEDS/BARRIERS: None indicated.    PAST SOCIAL HISTORY: 58-year-old female lives at home with family    PRIOR FUNCTION: Per report on soft diet with nectar thick liquid    PATIENT GOALS/EXPECTATIONS: Continue eating orally    HISTORY: 58-year-old female the above diagnosis referred for speech therapy evaluation to assess for swallowing.  Patient has been undergoing speech pathology treatment as an outpatient.  Patient was scheduled for modified barium swallow study this date.    CURRENT DIET LEVEL: N.p.o.    OBJECTIVE:    TEST ADMINISTERED: Clinical dysphagia evaluation    COGNITION/SAFETY AWARENESS: Impaired, patient followed basic directions, requiring cueing to remain alert.    BEHAVIORAL OBSERVATIONS: Cooperative, requiring cueing to remain alert, appearing to fall asleep at times.    ORAL MOTOR EXAM: Patient is edentulous.  Decreased general oral motor strength/range of motion.  Decreased laryngeal elevation.    VOICE QUALITY: Nearly aphonic    REFLEX EXAM: Deferred    POSTURE: Sitting upright in bed    FEEDING/SWALLOWING FUNCTION: Assessed with nectar liquid,  puréed solids, crunchy solid.    CLINICAL OBSERVATIONS:  Ice chip given x 1 with patient demonstrating wet cough.  Nectar liquid by cup with delayed swallow, vocal quality noted is unchanged.  Purée x 1 with patient producing multiple swallow, vocal quality remaining clear.  Patient requiring cueing at times to maintain alertness.  Solid not attempted due to patient appearing to fall asleep.    DYSPHAGIA CRITERIA: Swallow delay, decreased laryngeal function, risk of aspiration.    FUNCTIONAL ASSESSMENT INSTRUMENT: Patient currently scored a level 3 of 7 on Functional Communication Measures for swallowing indicating a 60-79% limitation in function.    ASSESSMENT/ PLAN OF CARE:  Pt presents with limitations, noted below, that impede her ability to swallow safely and maintain nutrition. The skills of a therapist will be required to safely and effectively implement the following treatment plan to restore maximal level of function.    PROBLEMS:  1.   Swallow delay, decreased laryngeal function, risk of aspiration                       LTG 1: 30 days: Patient will tolerate least restrictive diet utilizing appropriate positioning and strategies with minimal assistance.                       STG 1a: 14 days: Patient will tolerate diet puréed solids and nectar thickened liquids with minimal assistance for strategies.                       STG 1b: 14 days: Patient will tolerate 8 of 10 trials of soft solids with min to no signs or symptoms of aspiration.                       STG 1c: 14 days: Patient will participate in oral motor exercise program with strength/range of motion 4/5.                       STG 1d: 14 days: Patient/family education.                       TREATMENT: Dysphagia therapy to address swallow function through exercises and education of strategies.     FREQUENCY/DURATION: Twice daily 5 times per week.    REHAB POTENTIAL:  Pt has fair rehab potential.  The following limitations may influence improvement/ length of tx: Medical status.    RECOMMENDATIONS:   1.    DIET: Full liquid, nectar thick    2.  POSITION: Positioning fully upright for all p.o. intake and 30 minutes following.    3.  COMPENSATORY STRATEGIES: Small bites and small single sips, double swallow each bite/sip.  Slow rate.    Pt/responsible party agrees with plan of care and has been informed of all alternatives, risks and benefits.                            Anticipated Discharge Disposition (SLP): home with assist (08/22/24 1105)                                                               EDUCATION  The patient has been educated in the following areas:   Modified Diet Instruction.                Time Calculation:    Time Calculation- SLP       Row Name 08/22/24 1105             Time Calculation- SLP    SLP Start Time 0900  -TB      SLP Stop Time 1000  -TB      SLP Time Calculation (min) 60 min  -TB      SLP Received On 08/22/24  -TB         Untimed Charges    SLP Eval/Re-eval  ST Eval Oral Pharyng Swallow - 23415  -TB      55084-QV Eval Oral Pharyng Swallow Minutes 60  -TB         Total Minutes    Untimed Charges Total Minutes 60  -TB       Total Minutes 60  -TB                User Key  (r) = Recorded By, (t) = Taken By, (c) = Cosigned By      Initials Name Provider Type    TB Alison Boogie SLP Speech and Language Pathologist                    Therapy Charges for Today       Code Description Service Date Service Provider Modifiers Qty    [id number] HC ST EVAL ORAL PHARYNG SWALLOW 4 8/22/2024 Alison Boogie SLP GN 1                 SAM Wagoner  8/22/2024

## 2024-08-22 NOTE — PLAN OF CARE
Goal Outcome Evaluation:  Plan of Care Reviewed With: patient         ASSESSMENT/ PLAN OF CARE:  Pt presents with limitations, noted below, that impede her ability to swallow safely and maintain nutrition. The skills of a therapist will be required to safely and effectively implement the following treatment plan to restore maximal level of function.    PROBLEMS:  1.   Swallow delay, decreased laryngeal function, risk of aspiration                       LTG 1: 30 days: Patient will tolerate least restrictive diet utilizing appropriate positioning and strategies with minimal assistance.                       STG 1a: 14 days: Patient will tolerate diet puréed solids and nectar thickened liquids with minimal assistance for strategies.                       STG 1b: 14 days: Patient will tolerate 8 of 10 trials of soft solids with min to no signs or symptoms of aspiration.                       STG 1c: 14 days: Patient will participate in oral motor exercise program with strength/range of motion 4/5.                       STG 1d: 14 days: Patient/family education.                       TREATMENT: Dysphagia therapy to address swallow function through exercises and education of strategies.     FREQUENCY/DURATION: Twice daily 5 times per week.    REHAB POTENTIAL:  Pt has fair rehab potential.  The following limitations may influence improvement/ length of tx: Medical status.    RECOMMENDATIONS:   1.   DIET: Full liquid, nectar thick    2.  POSITION: Positioning fully upright for all p.o. intake and 30 minutes following.    3.  COMPENSATORY STRATEGIES: Small bites and small single sips, double swallow each bite/sip.  Slow rate.         Anticipated Discharge Disposition (SLP): home with assist

## 2024-08-22 NOTE — PROGRESS NOTES
[facility] Progress Note      Patient Name:  Isha Llanes   MRN:  [id number]   :  1965   Date of Admission:  2024   Date of Service:  2024   PMD:  Katia Wright MD     Hospital Course:     58 y.o. female with past medical history of SLE, Hodgkin's lymphoma, type 2 diabetes, GERD, aortic valve replacement, squamous cell carcinoma and CHF presents to the ED due to altered mental status.  As per daughter, patient has an complaining of abdominal pain, nausea and worsening mental status throughout the day.  In addition patient has been having chills.  Patient states this type of symptoms happens to her before.  Patient was recently discharged on  for hypoxemic respiratory failure and acute encephalopathy.  In the ED, labs show temperature 99.8, heart rate 118 and blood pressure 119/53.  ABG shows pH of 7.4, pCO2 46.9, pO2 73.5.  Labs show sodium 137, creatinine 0.41, lactic 1.0, white blood cell 9.5 and hemoglobin 14.2.  X-ray shows no acute findings.  CT head was unremarkable.  Patient admitted to floors for further management.     Consultants:    Speech evaluation    Procedures:  CT chest  Chest x-ray  CT head    Anti-infectives:    Cefepime  Vancomycin    2024    Chief Complaint: Follow-up patient with fever chills cough and sore throat    Subjective:   Has hoarseness of voice discussed with the daughter in very detail answered all her questions  She has speech evaluation regularly    Review Of Systems:  GENERAL: Complains of weakness and fatigue no time is denies any weight loss appetite is normal.  HEENT:  Denies any rhinitis, no sore throat, no diplopia , hearing is normal  Respiratory: Complains of shortness of breath , no sweating, complains of dyspnea on exertion , cough or sputum.  CVS:  Denies any chest pain , palpitation or syncope.  Gi:  No nausea, no vomiting, no diarrhea, no hematemesis , no melena , no rectal bleeding  :  No dysuria frequency ,  hematuria, no retention:  Musculoskeletal:  Denies any arthritis, or calf pain    Hematological:  No bleeding , no petechiae.  Skin:  Denies rash , pruritus , jaundice  Endocrine:  No polyuria , polydipsia , polyphagia.  CNS:  Denies any confusion , headache , or seizure disorder  Psychiatry:  No anxiety , or depression, no suicide ideation      Objective:  Vitals:    08/22/24 0738 08/22/24 0841 08/22/24 1217 08/22/24 1226   BP:  99/56  110/58   BP Location:  Right arm  Right arm   Patient Position:  Lying  Sitting   Pulse: 98 91 95 88   Resp: 16 20 20 20   Temp:  97.2 °F (36.2 °C)  97.7 °F (36.5 °C)   TempSrc:  Oral  Oral   SpO2: 98% 97% 98% 98%   Weight:       Height:              Physical Exam:  GENERAL APPEARANCE: Alert and oriented.  Appears comfortable.  No acute distress  HEENT: Atraumatic, normocephalic,  PERRLA, mucous membranes moist  NECK: supple; no JVD or thyromegaly noted  CARDIOVASCULAR: Regular rate and rhythm, no murmurs appreciated; no edema present in BLE   RESPIRATORY: Harsh vesicular breathing with scattered rhonchi and crepitation audible bilaterally  GASTROINTESTINAL:  Abdomen  soft; bowel sounds present, non-tender, non-distended, no organomegaly, no CVA tenderness   EXTREMITIES: Pulses equal bilaterally   MUSCULOSKELETAL:  Good muscle strength noted no obvious deformity appreciat  PSYCH: Appropriate mood and affect  Neurologic:  Alert & oriented x 3.  Normal Mental status.  Normal Cranial Nervies.  EOMI.  EMILY.  Normal 5/5 muscular strength in both upper and lower extremities.  Normal sensation.  Normal and symmetric reflexes. Normal cerbellar function.  Normal Gait. Negative Babinski.    Labs Reviewed  CBC:    Lab Results   Component Value Date    WBC 6.83 08/22/2024    HGB 12.8 08/22/2024    HCT 41.3 08/22/2024    MCV 92.6 08/22/2024     CMP:    Lab Results   Component Value Date     08/22/2024    CO2 28.3 08/22/2024    GLUCOSE 105 (H) 08/22/2024    BUN 11 08/22/2024    CREATININE  "0.46 (L) 08/22/2024    ALBUMIN 4.0 08/21/2024    CALCIUM 9.0 08/22/2024    AST 15 08/21/2024    ALT 7 08/21/2024     Lipis Panel:   Lab Results   Component Value Date    CHOL 142 01/12/2023    HDL 30 (L) 01/12/2023      INR:    Lab Results   Component Value Date    INR 1.09 02/05/2024     Cardiac:    Lab Results   Component Value Date    CKMB 115.10 (H) 05/26/2023     No results found for: \"BNP\"  Lactate:    Lab Results   Component Value Date    LACTATE 1.0 08/21/2024    LACTATE 1.0 08/21/2024    LACTATE 1.1 08/21/2024     Blood Culture:  @lastlabx(cult:2)@    Imaging:  CT Chest Without Contrast Diagnostic    Result Date: 8/22/2024  Narrative: CT ABDOMEN PELVIS WO CONTRAST, CT CHEST WO CONTRAST DIAGNOSTIC Date of Exam: 8/22/2024 2:47 PM EDT Indication: Abdominal pain. Shortness of breath, cancer Comparison: Chest CT 7/20/2024, PET/CT 5/6/2024, CT abdomen and pelvis 3/7/2024 Technique: Axial CT images were obtained of the chest, abdomen and pelvis without the administration of contrast. Reconstructed coronal and sagittal images were also obtained. Automated exposure control and iterative construction methods were used. Findings: CT CHEST: MEDIASTINUM: Heavy mitral annular calcification. There is an aortic valve prosthesis. Aortic and heart size are normal. No mass nor pericardial effusion. CORONARY ARTERIES: There is calcified atherosclerotic disease. LUNGS: Worsening areas of groundglass attenuation with some more dense consolidation within the right middle and lower lobe. Clearance of previous posterior left lower lobe airspace disease with new faint groundglass opacities within the anterior left lower lobe. Findings are consistent with ongoing infectious/inflammatory process. There is a calcified right lower lobe granuloma. No suspicious nodule. There is moderate emphysema, stable. PLEURAL SPACE: No effusion, mass, nor pneumothorax. CT ABDOMEN AND PELVIS:  LIVER:  Unremarkable parenchyma without focal lesion. " BILIARY/GALLBLADDER: Cholecystectomy SPLEEN:  Unremarkable PANCREAS:  Unremarkable ADRENAL:  Unremarkable KIDNEYS:  Unremarkable parenchyma with no solid mass identified. No obstruction.  There is a 2-3 mm calculus within the mid to upper right kidney, unchanged. GASTROINTESTINAL/MESENTERY:  No evidence of obstruction nor inflammation. AORTA/IVC:  Normal caliber. RETROPERITONEUM/LYMPH NODES:  Unremarkable REPRODUCTIVE: Hysterectomy BLADDER:  Unremarkable OSSEUS STRUCTURES: No acute process nor significant change identified.     Impression: Impression: 1.Worsening of right lung airspace disease with shifting left lung airspace disease consistent with ongoing infectious/inflammatory process. 2.No acute process in the abdomen or pelvis. 3.Other stable chronic findings. Electronically Signed: Werner Linares MD  8/22/2024 3:31 PM EDT  Workstation ID: [id number]    CT Abdomen Pelvis Without Contrast    Result Date: 8/22/2024  Narrative: CT ABDOMEN PELVIS WO CONTRAST, CT CHEST WO CONTRAST DIAGNOSTIC Date of Exam: 8/22/2024 2:47 PM EDT Indication: Abdominal pain. Shortness of breath, cancer Comparison: Chest CT 7/20/2024, PET/CT 5/6/2024, CT abdomen and pelvis 3/7/2024 Technique: Axial CT images were obtained of the chest, abdomen and pelvis without the administration of contrast. Reconstructed coronal and sagittal images were also obtained. Automated exposure control and iterative construction methods were used. Findings: CT CHEST: MEDIASTINUM: Heavy mitral annular calcification. There is an aortic valve prosthesis. Aortic and heart size are normal. No mass nor pericardial effusion. CORONARY ARTERIES: There is calcified atherosclerotic disease. LUNGS: Worsening areas of groundglass attenuation with some more dense consolidation within the right middle and lower lobe. Clearance of previous posterior left lower lobe airspace disease with new faint groundglass opacities within the anterior left lower lobe. Findings are  consistent with ongoing infectious/inflammatory process. There is a calcified right lower lobe granuloma. No suspicious nodule. There is moderate emphysema, stable. PLEURAL SPACE: No effusion, mass, nor pneumothorax. CT ABDOMEN AND PELVIS:  LIVER:  Unremarkable parenchyma without focal lesion. BILIARY/GALLBLADDER: Cholecystectomy SPLEEN:  Unremarkable PANCREAS:  Unremarkable ADRENAL:  Unremarkable KIDNEYS:  Unremarkable parenchyma with no solid mass identified. No obstruction.  There is a 2-3 mm calculus within the mid to upper right kidney, unchanged. GASTROINTESTINAL/MESENTERY:  No evidence of obstruction nor inflammation. AORTA/IVC:  Normal caliber. RETROPERITONEUM/LYMPH NODES:  Unremarkable REPRODUCTIVE: Hysterectomy BLADDER:  Unremarkable OSSEUS STRUCTURES: No acute process nor significant change identified.     Impression: Impression: 1.Worsening of right lung airspace disease with shifting left lung airspace disease consistent with ongoing infectious/inflammatory process. 2.No acute process in the abdomen or pelvis. 3.Other stable chronic findings. Electronically Signed: Werner Linares MD  8/22/2024 3:31 PM EDT  Workstation ID: [id number]    CT Head Without Contrast    Result Date: 8/21/2024  Narrative: CT HEAD WO CONTRAST Date of Exam: 8/21/2024 8:35 PM EDT Indication: Headache headache. Comparison: 6/13/2024 Technique: Axial CT images were obtained of the head without contrast administration.  Reconstructed coronal and sagittal images were also obtained. Automated exposure control and iterative construction methods were used. Findings: Gray-white matter differentiation is maintained without evidence of an acute infarction. No intracranial mass or mass effect. No extra-axial mass or collection. The ventricles and sulci are normal in size and configuration. The posterior fossa appears normal. Sellar and suprasellar structures are normal. Orbital and paranasal soft tissues are normal. The paranasal sinuses,  ethmoid air cells, and mastoid air cells are aerated. The bony calvarium appears intact. No acute fractures. No lytic or blastic bony diseases.     Impression: Impression: No acute intracranial pathology. Electronically Signed: Sony Escobedo MD  8/21/2024 8:39 PM EDT  Workstation ID: [id number]    XR Chest 1 View    Result Date: 8/21/2024  Narrative: XR CHEST 1 VW Date of Exam: 8/21/2024 4:51 PM EDT Indication: Weak/Dizzy/AMS triage protocol Comparison: 7/30/2024 Findings: No consolidation. No pneumothorax or pleural effusion. Left-sided infusion port with the tip in the superior vena cava. The clavicles are intact. No rib fractures. The visualized upper abdomen is normal.     Impression: No acute cardiopulmonary disease. Electronically Signed: Sony Escobedo MD  8/21/2024 5:10 PM EDT  Workstation ID: [id number]    XR Chest 1 View    Result Date: 7/30/2024  Narrative: XR CHEST 1 VW-  Date of exam: 7/30/2024, 3:28 A.M.  Indication: dyspnea  Comparison: 7/20/2024.  FINDINGS: A single AP (or PA) upright portable chest radiograph was performed. No cardiac enlargement is seen. No acute infiltrate is appreciated. Improved aeration of the lungs is suggested bilaterally since the prior exam. No pleural effusion or pneumothorax is identified. The patient has undergone transcatheter aortic valve replacement (TAVR), as before. Chronic calcified granulomatous disease involves the chest. The thoracic aorta is atherosclerotic and ectatic. There is no significant change in the left IJ central venous line with its distal tip in the expected lower superior vena cava (SVC). Degenerative changes involve the shoulders. Surgical clips are projected over the left shoulder girdle, seen previously. No significant interval change is seen since the prior study (or studies).      Impression: No acute infiltrate is appreciated.    Please note that portions of this note were completed with a voice recognition program.   Electronically Signed By-Sony  MD Sana On:7/30/2024 4:04 AM        Medications Reviewed:  budesonide, 0.5 mg, Nebulization, BID - RT  cefepime, 2,000 mg, Intravenous, Q8H  clopidogrel, 75 mg, Oral, Daily  ipratropium-albuterol, 3 mL, Nebulization, 4x Daily - RT  nicotine, 1 patch, Transdermal, Q24H  predniSONE, 10 mg, Oral, Daily  sodium chloride, 10 mL, Intravenous, Q12H  vancomycin, 20 mg/kg, Intravenous, Q12H         Assessment/Plan:      AMS (altered mental status)      Medical decision making:  Bilateral healthcare associated pneumonia:  Follow cultures  Continue Vanco/cefepime  Continue bronchodilators    Squamous, 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  On speech evaluation:  Speech therapy  Follow-up with hematology oncology    Acute metabolic encephalopathy: Possibly drug-induced:  Resolved and improved      Status post AVR:  Continue to monitor    Chronic low back pain with sacroiliac inflammation:  On pain pump    Chronic pain syndrome:  Follow-up with pain clinic    Type 2 diabetes mellitus:  Blood sugar ACHS  Sliding scale    Major depression:  Continue SSRI    GERD:  Continue PPI    CAD:  Follow home      DVT Prophylaxis:    SCUDs    CODE STATUS:  Full code  Reason for continued hospitalization  and medical necessity:  Patient with bilateral pneumonia    Orders:  CBC  CMP  Cefepime  Vancomycin  Blood cultures  Urine culture    Speech therapy    Time spent:  39+ minute not only  including face-to-face rounding putting in the orders writing the note and all the conversation reviewing the records    This transcription was electronically signed.         Disposition: Home self-care    Diet: Puréed diet      Discussed with: With patient and daughter in very detail with RN      This has been electronically signed by:  _______________________________    Silvia [email or web address] [email or web address]     At [facility], we believe that sharing information builds trust and better relationships. You are receiving this note because you recently  visited [facility]. It is possible you will see health information before a provider has talked with you about it. This kind of information can be easy to misunderstand. To help you fully understand what it means for your health, we urge you to discuss this note with your provider.           Part of this note may be an electronic transcription/translation of spoken language to printed ,text using the Dragon Dictation System.

## 2024-08-22 NOTE — SIGNIFICANT NOTE
Wound Eval / Progress Noted    JEMMA Go     Patient Name: Isha Llanes  : 1965  MRN: [id number]  Today's Date: 2024                 Admit Date: 2024    Visit Dx:    ICD-10-CM ICD-9-CM   1. Opioid overdose, undetermined intent, initial encounter  T40.2X4A 965.00     E980.0   2. Oropharyngeal dysphagia  R13.12 787.22         AMS (altered mental status)        Past Medical History:   Diagnosis Date    Anesthesia     REPORTS HAS GOT REAL EMOTIONAL AND HAS BECOME ANGRY BEFORE    Anxiety     Aortic stenosis, severe     HAS BIO VALVE REPLACED    Arthritis     Asthma     Back pain     Blood clotting disorder     Cancer     Cancer associated pain     HODGKINS LYMPHOMA    CHF (congestive heart failure)     Chronic low back pain with sciatica     Chronic pain disorder     COPD (chronic obstructive pulmonary disease)     Coronary artery disease     Diabetes mellitus     TYPE 2    Diabetes mellitus     Dyspnea on effort     GERD (gastroesophageal reflux disease)     H/O lumpectomy     H/O: hysterectomy     Hiatal hernia     History of degenerative disc disease     History of kidney stones     History of pulmonary embolus (PE)     History of transfusion     AS A CHILD NO TRANSFUSION REACTION    History of transfusion     Hodgkin's lymphoma     23  5 YEARS SINCE LAST CHEMO TX    Hypertension     Immobility     Liver disease     DENIES ANY CURRENT ISSUES    Lupus     Mitral valve prolapse     Nausea vomiting and diarrhea 3/7/2024    Panic disorder     Pelvic pain     Peripheral neuropathy     Personal history of DVT (deep vein thrombosis)     Presence of intrathecal pump     PTSD (post-traumatic stress disorder)     Sacroiliac joint disease     SI (sacroiliac) joint inflammation     Sleep apnea     Venous insufficiency         Past Surgical History:   Procedure Laterality Date    ABDOMINAL SURGERY      BACK SURGERY      SPINAL FUSION     BACK SURGERY      BLADDER REPAIR      CARDIAC CATHETERIZATION  N/A 03/06/2019    Procedure: Valvuloplasty;  Surgeon: Wayne Johnson MD;  Location: [facility] CATH INVASIVE LOCATION;  Service: Cardiology    CARDIAC CATHETERIZATION      CARDIAC CATHETERIZATION Left 5/31/2023    Procedure: Cardiac Catheterization/Vascular Study;  Surgeon: Poncho Reid MD;  Location: [facility] CATH INVASIVE LOCATION;  Service: Cardiovascular;  Laterality: Left;    CARDIAC SURGERY      CARDIAC VALVE REPLACEMENT  2021    CHOLECYSTECTOMY      COLONOSCOPY N/A 12/29/2021    Procedure: COLONOSCOPY;  Surgeon: Karine Orlando MD;  Location: [facility] ENDOSCOPY;  Service: Gastroenterology;  Laterality: N/A;  POOR PREP    COLONOSCOPY N/A 04/13/2022    Procedure: COLONOSCOPY WITH POLYPECTOMY;  Surgeon: Karine Orlando MD;  Location: [facility] ENDOSCOPY;  Service: Gastroenterology;  Laterality: N/A;  COLON POLYP, HEMORRHOIDS, POOR PREP    COLONOSCOPY      DIRECT LARYNGOSCOPY, ESOPHAGOSCOPY, BRONCHOSCOPY N/A 5/22/2023    Procedure: DIRECT LARYNGOSCOPY WITH BIOPSIES , ESOPHAGOSCOPY, BRONCHOSCOPY;  Surgeon: Ronnie Mcgarry MD;  Location: [facility] OR OSC;  Service: ENT;  Laterality: N/A;    ENDOSCOPY N/A 12/29/2021    Procedure: ESOPHAGOGASTRODUODENOSCOPY;  Surgeon: Karine Orlando MD;  Location: [facility] ENDOSCOPY;  Service: Gastroenterology;  Laterality: N/A;  ESOPHAGITIS, GASTRITIS, HIATAL HERNIA    ENDOSCOPY      ENDOSCOPY N/A 4/16/2024    Procedure: ESOPHAGOGASTRODUODENOSCOPY WITH BIOPSIES;  Surgeon: Karine Orlando MD;  Location: [facility] ENDOSCOPY;  Service: Gastroenterology;  Laterality: N/A;  ESOPHAGITIS, HIATAL HERNIA    HYSTERECTOMY      LYMPHADENECTOMY      PAIN PUMP INSERTION/REVISION N/A 10/18/2019    Procedure: PAIN PUMP INSERTION [facility] 10-18-19 [facility];  Surgeon: Horace Rios MD;  Location: [facility] OR;  Service: Pain Management    PAIN PUMP INSERTION/REVISION N/A 11/23/2020    Procedure: pain pump removal;  Surgeon: Horace Rios MD;  Location:   [facility];  Service: Pain Management;  Laterality: N/A;    PEG TUBE INSERTION N/A 7/26/2023    Procedure: PERCUTANEOUS ENDOSCOPIC GASTROSTOMY TUBE INSERTION;  Surgeon: Kody Mercer MD;  Location: [facility] ENDOSCOPY;  Service: General;  Laterality: N/A;  SUCCESSFUL PEG TUBE PLACE PLACEMENT    PORTACATH PLACEMENT      IN LEFT CHEST REPORTS THAT IT IS NOT FUNCTIONING    SKIN BIOPSY      TONSILLECTOMY      TUBAL ABDOMINAL LIGATION      TUMOR REMOVAL           Physical Assessment:  Wound 08/22/24 0627 Right lower leg Other (comment) (Active)   Wound Image   08/22/24 0600   Dressing Appearance open to air 08/22/24 1258   Closure None 08/22/24 1258   Base dry;other (see comments) 08/22/24 1258   Periwound redness;pink;dry 08/22/24 1258   Periwound Temperature warm 08/22/24 1258   Periwound Skin Turgor soft 08/22/24 1258   Edges rolled/closed 08/22/24 1258   Drainage Amount none 08/22/24 1258   Care, Wound cleansed with;sterile normal saline 08/22/24 1258   Dressing Care dressing applied;non-adherent;petroleum-based;gauze;tubular wrap 08/22/24 1258   Periwound Care moisturizer applied 08/22/24 1258       Wound 08/22/24 0629 Left lower leg Other (comment) (Active)   Wound Image   08/22/24 0600   Dressing Appearance open to air 08/22/24 1258   Closure None 08/22/24 1258   Base dry;other (see comments) 08/22/24 1258   Periwound redness;pink;dry 08/22/24 1258   Periwound Temperature warm 08/22/24 1258   Periwound Skin Turgor soft 08/22/24 1258   Edges rolled/closed 08/22/24 1258   Drainage Amount none 08/22/24 1258   Care, Wound cleansed with;sterile normal saline 08/22/24 1258   Dressing Care dressing applied;non-adherent;petroleum-based;gauze;tubular wrap 08/22/24 1258   Periwound Care moisturizer applied 08/22/24 1258       Wound 08/22/24 1258 Left anterior foot Blisters (Active)   Wound Image   08/22/24 1258   Dressing Appearance open to air 08/22/24 1258   Closure None 08/22/24 1258   Base other (see comments)  08/22/24 1258   Periwound dry;redness 08/22/24 1258   Periwound Temperature warm 08/22/24 1258   Periwound Skin Turgor soft 08/22/24 1258   Edges rolled/closed 08/22/24 1258   Drainage Amount none 08/22/24 1258   Care, Wound cleansed with;sterile normal saline 08/22/24 1258   Dressing Care dressing applied;petroleum-based;non-adherent;gauze;tubular wrap 08/22/24 1258   Periwound Care absorptive dressing applied 08/22/24 1258        Wound Check / Follow-up:  Patient seen today for wound consult. Patient is drowsy and only intermittently responds to staff, unable to determine orientation status. Patient is agreeable to assessment.    Bilateral lower extremities with areas of dry, crusted brown tissue. No active drainage noted from wound bases. Intact tissue to bilateral lower extremities is dry with intermittent flaking noted. Fluid filled blister noted to left anterior foot. Periwound tissue is dry with redness present. Cleansed all areas with normal saline and gauze, blotted dry. Recommending daily dressing changes with mineral oil-hydrophilic petrolatum (aquaphor) applied to bilateral lower extremities and feet, non-adherent, petroleum-based gauze applied to all crusted areas and blister, and legs wrapped with gauze roll from base of toes until bend of knee. Patient states she is agreeable to this at time of assessment. If gauze roll begins to cause patient discomfort, non-adherent, petroleum-based gauze may be secured with silicone border dressings.    No open tissue noted to gluteal aspects. Due to patient's current orientation status, I recommend to implement preventative measures. Implement Q2H turns and offload at all times. Keep the patient clean and free from all moisture. Recommend to provide quality skin care and hygiene, apply blue top moisture barrier BID and PRN incontinence.    Impression: Areas of dry, crusted brown tissue to bilateral lower extremities. Excessive dryness to bilateral lower  extremities. Fluid filled blister to left anterior foot.     Short term goals:  Regain skin integrity, skin protection, moisture prevention, pressure reduction, topical treatment, quality skin care and hygiene, daily dressing change.    Jessenia Camarillo RN    8/22/2024    18:30 EDT

## 2024-08-22 NOTE — PLAN OF CARE
Goal Outcome Evaluation:  Plan of Care Reviewed With: patient        Progress: no change  Outcome Evaluation: Patient unable to void, bladder scanned 430 ml, intermittent cath 425. Continues to c/o back pain, and heels pain, request pain meds.  Notified MD for pain med, Norco given per order.

## 2024-08-23 ENCOUNTER — TELEPHONE (OUTPATIENT)
Dept: FAMILY MEDICINE CLINIC | Facility: CLINIC | Age: 59
End: 2024-08-23
Payer: COMMERCIAL

## 2024-08-23 ENCOUNTER — APPOINTMENT (OUTPATIENT)
Dept: GENERAL RADIOLOGY | Facility: HOSPITAL | Age: 59
DRG: 177 | End: 2024-08-23
Payer: COMMERCIAL

## 2024-08-23 PROCEDURE — 63710000001 PREDNISONE PER 5 MG: Performed by: STUDENT IN AN ORGANIZED HEALTH CARE EDUCATION/TRAINING PROGRAM

## 2024-08-23 PROCEDURE — 94799 UNLISTED PULMONARY SVC/PX: CPT

## 2024-08-23 PROCEDURE — 74230 X-RAY XM SWLNG FUNCJ C+: CPT

## 2024-08-23 PROCEDURE — 92611 MOTION FLUOROSCOPY/SWALLOW: CPT

## 2024-08-23 PROCEDURE — 99233 SBSQ HOSP IP/OBS HIGH 50: CPT | Performed by: INTERNAL MEDICINE

## 2024-08-23 PROCEDURE — 25010000002 AZITHROMYCIN PER 500 MG: Performed by: INTERNAL MEDICINE

## 2024-08-23 PROCEDURE — 25810000003 SODIUM CHLORIDE 0.9 % SOLUTION: Performed by: INTERNAL MEDICINE

## 2024-08-23 PROCEDURE — 94664 DEMO&/EVAL PT USE INHALER: CPT

## 2024-08-23 PROCEDURE — 25010000002 ONDANSETRON PER 1 MG: Performed by: STUDENT IN AN ORGANIZED HEALTH CARE EDUCATION/TRAINING PROGRAM

## 2024-08-23 PROCEDURE — 25810000003 LACTATED RINGERS PER 1000 ML: Performed by: STUDENT IN AN ORGANIZED HEALTH CARE EDUCATION/TRAINING PROGRAM

## 2024-08-23 PROCEDURE — 25010000002 CEFEPIME PER 500 MG: Performed by: STUDENT IN AN ORGANIZED HEALTH CARE EDUCATION/TRAINING PROGRAM

## 2024-08-23 PROCEDURE — 94761 N-INVAS EAR/PLS OXIMETRY MLT: CPT

## 2024-08-23 PROCEDURE — 97161 PT EVAL LOW COMPLEX 20 MIN: CPT

## 2024-08-23 RX ORDER — ALPRAZOLAM 0.25 MG
0.5 TABLET ORAL NIGHTLY
Status: DISCONTINUED | OUTPATIENT
Start: 2024-08-23 | End: 2024-08-24

## 2024-08-23 RX ORDER — SODIUM PHOSPHATE,MONO-DIBASIC 19G-7G/118
1 ENEMA (ML) RECTAL ONCE
Status: COMPLETED | OUTPATIENT
Start: 2024-08-23 | End: 2024-08-23

## 2024-08-23 RX ORDER — CALCIUM CARBONATE 500 MG/1
1 TABLET, CHEWABLE ORAL ONCE
Status: DISCONTINUED | OUTPATIENT
Start: 2024-08-23 | End: 2024-08-25 | Stop reason: HOSPADM

## 2024-08-23 RX ADMIN — WHITE PETROLATUM 1 APPLICATION: 1.75 OINTMENT TOPICAL at 10:57

## 2024-08-23 RX ADMIN — SODIUM PHOSPHATE 1 ENEMA: 7; 19 ENEMA RECTAL at 20:04

## 2024-08-23 RX ADMIN — Medication 10 ML: at 20:04

## 2024-08-23 RX ADMIN — IPRATROPIUM BROMIDE AND ALBUTEROL SULFATE 3 ML: .5; 3 SOLUTION RESPIRATORY (INHALATION) at 21:11

## 2024-08-23 RX ADMIN — CLOPIDOGREL BISULFATE 75 MG: 75 TABLET ORAL at 08:05

## 2024-08-23 RX ADMIN — HYDROCODONE BITARTRATE AND ACETAMINOPHEN 1 TABLET: 5; 325 TABLET ORAL at 10:31

## 2024-08-23 RX ADMIN — BARIUM SULFATE 30 ML: 400 PASTE ORAL at 10:01

## 2024-08-23 RX ADMIN — ACETAMINOPHEN 650 MG: 325 TABLET ORAL at 01:48

## 2024-08-23 RX ADMIN — NICOTINE 1 PATCH: 21 PATCH, EXTENDED RELEASE TRANSDERMAL at 08:10

## 2024-08-23 RX ADMIN — BARIUM SULFATE 50 ML: 400 SUSPENSION ORAL at 10:01

## 2024-08-23 RX ADMIN — SODIUM CHLORIDE, POTASSIUM CHLORIDE, SODIUM LACTATE AND CALCIUM CHLORIDE 100 ML/HR: 600; 310; 30; 20 INJECTION, SOLUTION INTRAVENOUS at 10:04

## 2024-08-23 RX ADMIN — Medication 10 ML: at 09:13

## 2024-08-23 RX ADMIN — HYDROCODONE BITARTRATE AND ACETAMINOPHEN 1 TABLET: 5; 325 TABLET ORAL at 17:29

## 2024-08-23 RX ADMIN — ALPRAZOLAM 0.5 MG: 0.25 TABLET ORAL at 20:03

## 2024-08-23 RX ADMIN — IPRATROPIUM BROMIDE AND ALBUTEROL SULFATE 3 ML: .5; 3 SOLUTION RESPIRATORY (INHALATION) at 00:52

## 2024-08-23 RX ADMIN — ACETAMINOPHEN 650 MG: 325 TABLET ORAL at 08:05

## 2024-08-23 RX ADMIN — CEFEPIME 2000 MG: 2 INJECTION, POWDER, FOR SOLUTION INTRAVENOUS at 12:15

## 2024-08-23 RX ADMIN — CEFEPIME 2000 MG: 2 INJECTION, POWDER, FOR SOLUTION INTRAVENOUS at 02:51

## 2024-08-23 RX ADMIN — PREDNISONE 10 MG: 10 TABLET ORAL at 08:05

## 2024-08-23 RX ADMIN — BARIUM SULFATE 55 ML: 0.81 POWDER, FOR SUSPENSION ORAL at 10:01

## 2024-08-23 RX ADMIN — CEFEPIME 2000 MG: 2 INJECTION, POWDER, FOR SOLUTION INTRAVENOUS at 20:04

## 2024-08-23 RX ADMIN — BUDESONIDE 0.5 MG: 0.5 INHALANT ORAL at 21:10

## 2024-08-23 RX ADMIN — CALCIUM CARBONATE 2 TABLET: 500 TABLET, CHEWABLE ORAL at 18:38

## 2024-08-23 RX ADMIN — IPRATROPIUM BROMIDE AND ALBUTEROL SULFATE 3 ML: .5; 3 SOLUTION RESPIRATORY (INHALATION) at 13:33

## 2024-08-23 RX ADMIN — AZITHROMYCIN 500 MG: 500 INJECTION, POWDER, LYOPHILIZED, FOR SOLUTION INTRAVENOUS at 10:32

## 2024-08-23 RX ADMIN — ONDANSETRON 4 MG: 2 INJECTION INTRAMUSCULAR; INTRAVENOUS at 08:05

## 2024-08-23 RX ADMIN — SODIUM CHLORIDE, POTASSIUM CHLORIDE, SODIUM LACTATE AND CALCIUM CHLORIDE 100 ML/HR: 600; 310; 30; 20 INJECTION, SOLUTION INTRAVENOUS at 20:03

## 2024-08-23 RX ADMIN — ONDANSETRON 4 MG: 2 INJECTION INTRAMUSCULAR; INTRAVENOUS at 23:18

## 2024-08-23 NOTE — PLAN OF CARE
Goal Outcome Evaluation:  Plan of Care Reviewed With: patient        Progress: no change  Outcome Evaluation: Patient presents with deficits in balance, endurance, and transfers. Patient will benefit from skilled PT services to address these mobility deficits and decrease risk of falls.      Anticipated Discharge Disposition (PT): home with home health

## 2024-08-23 NOTE — MBS/VFSS/FEES
Acute Care - Speech Language Pathology   Swallow  Modified Barium Swallow Study  JEMMA Go     Patient Name: Isha Llanes  : 1965  MRN: [id number]  Today's Date: 2024               Admit Date: 2024    Visit Dx:     ICD-10-CM ICD-9-CM   1. Opioid overdose, undetermined intent, initial encounter  T40.2X4A 965.00     E980.0   2. Oropharyngeal dysphagia  R13.12 787.22     Patient Active Problem List   Diagnosis    Septic shock    Acute MI    Cancer associated pain    Presence of intrathecal pump    Chronic low back pain with bilateral sciatica    Sacroiliac inflammation    Chronic pain syndrome    Pelvic pain    Aortic stenosis, severe    Dyspnea on effort    Other pulmonary embolism without acute cor pulmonale    Nonrheumatic aortic valve stenosis    Hip pain    Anxiety disorder    Allergic rhinitis due to allergen    Arthritis    Asthma    Kidney disease    CHF (congestive heart failure)    Chronic obstructive pulmonary disease    Diabetes mellitus, type 2    Depression    GERD (gastroesophageal reflux disease)    S/P TAVR (transcatheter aortic valve replacement)    Limited mobility    Venous hypertension of both lower extremities    Nicotine dependence    Mitral valve prolapse    Lupus (systemic lupus erythematosus)    Long term current use of anticoagulant therapy    History of Hodgkin's lymphoma    Hepatic steatosis    Heart murmur    CAD (coronary artery disease)    Non-healing wound of lower extremity, subsequent encounter    MSSA (methicillin susceptible Staphylococcus aureus) infection    Sepsis    Change in bowel habits    Nausea    Venous stasis ulcer of right calf    Venous stasis ulcer of left calf    Noncompliance    Peripheral vascular disease    Obesity with body mass index 30 or greater    Neurologic disorder    Neck pain    Limb pain    Hiatal hernia    Bladder disorder    Frailty    Cellulitis of right foot    Bowel disease    Atrophy of skin    Breast lump    Encounter for  care related to vascular access port    Primary osteoarthritis of right hip    Tobacco abuse    Laryngeal cancer    Throat pain    Voice hoarseness    Hodgkin lymphoma    Malignant neoplasm of supraglottis    Dysphagia    Nausea vomiting and diarrhea    Weight loss, abnormal    Epigastric pain    Nausea and vomiting    History of laryngeal cancer    Anorexia    AMS (altered mental status)    Acute-on-chronic respiratory failure    UTI (urinary tract infection)     Past Medical History:   Diagnosis Date    Anesthesia     REPORTS HAS GOT REAL EMOTIONAL AND HAS BECOME ANGRY BEFORE    Anxiety     Aortic stenosis, severe     HAS BIO VALVE REPLACED    Arthritis     Asthma     Back pain     Blood clotting disorder     Cancer     Cancer associated pain     HODGKINS LYMPHOMA    CHF (congestive heart failure)     Chronic low back pain with sciatica     Chronic pain disorder     COPD (chronic obstructive pulmonary disease)     Coronary artery disease     Diabetes mellitus     TYPE 2    Diabetes mellitus     Dyspnea on effort     GERD (gastroesophageal reflux disease)     H/O lumpectomy     H/O: hysterectomy     Hiatal hernia     History of degenerative disc disease     History of kidney stones     History of pulmonary embolus (PE)     History of transfusion     AS A CHILD NO TRANSFUSION REACTION    History of transfusion     Hodgkin's lymphoma     5/19/23  5 YEARS SINCE LAST CHEMO TX    Hypertension     Immobility     Liver disease     DENIES ANY CURRENT ISSUES    Lupus     Mitral valve prolapse     Nausea vomiting and diarrhea 3/7/2024    Panic disorder     Pelvic pain     Peripheral neuropathy     Personal history of DVT (deep vein thrombosis)     Presence of intrathecal pump     PTSD (post-traumatic stress disorder)     Sacroiliac joint disease     SI (sacroiliac) joint inflammation     Sleep apnea     Venous insufficiency      Past Surgical History:   Procedure Laterality Date    ABDOMINAL SURGERY      BACK SURGERY       SPINAL FUSION     BACK SURGERY      BLADDER REPAIR      CARDIAC CATHETERIZATION N/A 03/06/2019    Procedure: Valvuloplasty;  Surgeon: Wayne Johnson MD;  Location: [facility] CATH INVASIVE LOCATION;  Service: Cardiology    CARDIAC CATHETERIZATION      CARDIAC CATHETERIZATION Left 5/31/2023    Procedure: Cardiac Catheterization/Vascular Study;  Surgeon: Poncho Reid MD;  Location: [facility] CATH INVASIVE LOCATION;  Service: Cardiovascular;  Laterality: Left;    CARDIAC SURGERY      CARDIAC VALVE REPLACEMENT  2021    CHOLECYSTECTOMY      COLONOSCOPY N/A 12/29/2021    Procedure: COLONOSCOPY;  Surgeon: Karine Orlando MD;  Location: [facility] ENDOSCOPY;  Service: Gastroenterology;  Laterality: N/A;  POOR PREP    COLONOSCOPY N/A 04/13/2022    Procedure: COLONOSCOPY WITH POLYPECTOMY;  Surgeon: Karine Orlando MD;  Location: [facility] ENDOSCOPY;  Service: Gastroenterology;  Laterality: N/A;  COLON POLYP, HEMORRHOIDS, POOR PREP    COLONOSCOPY      DIRECT LARYNGOSCOPY, ESOPHAGOSCOPY, BRONCHOSCOPY N/A 5/22/2023    Procedure: DIRECT LARYNGOSCOPY WITH BIOPSIES , ESOPHAGOSCOPY, BRONCHOSCOPY;  Surgeon: Ronnie Mcgarry MD;  Location: [facility] OR [facility];  Service: ENT;  Laterality: N/A;    ENDOSCOPY N/A 12/29/2021    Procedure: ESOPHAGOGASTRODUODENOSCOPY;  Surgeon: Karine Orlando MD;  Location: [facility] ENDOSCOPY;  Service: Gastroenterology;  Laterality: N/A;  ESOPHAGITIS, GASTRITIS, HIATAL HERNIA    ENDOSCOPY      ENDOSCOPY N/A 4/16/2024    Procedure: ESOPHAGOGASTRODUODENOSCOPY WITH BIOPSIES;  Surgeon: Karine Orlando MD;  Location: [facility] ENDOSCOPY;  Service: Gastroenterology;  Laterality: N/A;  ESOPHAGITIS, HIATAL HERNIA    HYSTERECTOMY      LYMPHADENECTOMY      PAIN PUMP INSERTION/REVISION N/A 10/18/2019    Procedure: PAIN PUMP INSERTION hospitals 10-18-19 [place];  Surgeon: Horace Rios MD;  Location: [facility] OR;  Service: Pain Management    PAIN PUMP INSERTION/REVISION N/A  11/23/2020    Procedure: pain pump removal;  Surgeon: Horace Rios MD;  Location:  [facility];  Service: Pain Management;  Laterality: N/A;    PEG TUBE INSERTION N/A 7/26/2023    Procedure: PERCUTANEOUS ENDOSCOPIC GASTROSTOMY TUBE INSERTION;  Surgeon: Kody Mercer MD;  Location:  [facility];  Service: General;  Laterality: N/A;  SUCCESSFUL PEG TUBE PLACE PLACEMENT    PORTACATH PLACEMENT      IN LEFT CHEST REPORTS THAT IT IS NOT FUNCTIONING    SKIN BIOPSY      TONSILLECTOMY      TUBAL ABDOMINAL LIGATION      TUMOR REMOVAL         MODIFIED BARIUM SWALLOW STUDY: SPEECH PATHOLOGY REPORT        DATE OF SERVICE: 8/23/2024    PERTINENT INFORMATION:  Ms. Llanes is a 58year old female with diagnosis of dysphagia, status post throat cancer with radiation.    She was referred for an MBSS by Dr. Buchanan to rule out aspiration as well as to determine appropriate treatment plan for this patient.      PROCEDURE:    Ms. Llanes was alert and cooperative.  The patient was viewed in the lateral plane.  The following Ba consistencies were administered: Thin liquids, nectar thick liquids, honey thick liquid, barium mixed with applesauce, barium paste, barium mixed with cracker. The following compensatory swallowing strategies were performed: Bolus modification, cyclic ingestion, chin tuck..      RESULTS:    1. Nectar liquid by spoon with spillage to the vallecula, swallow completed with laryngeal penetration.  Residual coating remaining in the laryngeal vestibule.  Patient cued for cough which does not clear.  2. Nectar liquid by cup with chin tuck, spillage to the vallecula, swallow completed with laryngeal penetration.   3. Thin liquid with loss of partial bolus to the pharynx and into the laryngeal vestibule, swallow completed with aspiration occurring with no cough.  4. Purée with spill over base of tongue, swallow completed.  5. Pudding with a lower base of tongue, swallow completed.  6. Solid, cracker dipped in  applesauce, results with chewing followed by swallow completed.  7.  Honey thick liquid by cup with spillage to the vallecula, swallow completed.  8.  Nectar liquid by straw with chin tuck, spillage to the vallecula, swallow completed with deep laryngeal penetration.  Additional trial of nectar liquid by cup with chin tuck, spillage to the vallecula, swallow completed with deep laryngeal penetration to the vocal cords with no cough.  Residual coating remaining in the laryngeal vestibule.      IMPRESSIONS:    Ms. Llanes demonstrated oropharyngeal dysphagia characterized by swallow delay, decreased laryngeal elevation, decreased laryngeal closure.  Deep laryngeal penetration with nectar liquid, aspiration with thin with no cough.       RECOMMENDATIONS:   1.  Diet with mechanical soft solids, fork mashable, honey thickened liquid.  2.  Positioning fully upright for all p.o. intake and 30 minutes following.  3.  Alternate small bites and small sips of solids and liquids.  Chin tuck each bite/sip.  May benefit from supraglottic technique.  4.  Continue with speech pathology services to address dysphagia with education of strategies.        Yes, patient/responsible party agrees with the plan of care and has been informed of all alternatives, risks and benefits.    Thank you for this referral.                                                                                 Plan of Care Reviewed With: patient          EDUCATION  The patient has been educated in the following areas:   Modified Diet Instruction.                Time Calculation:    Time Calculation- SLP       Row Name 08/23/24 1111             Time Calculation- SLP    SLP Received On 08/23/24  -TB         Untimed Charges    SLP Eval/Re-eval  [facility] Fluoro Eval Swallow - 59563  -TB      [id number]-[id number] Motion Fluoro Eval Swallow Minutes 90  -TB         Total Minutes    Untimed Charges Total Minutes 90  -TB       Total Minutes 90  -TB                User Key  (r) =  Recorded By, (t) = Taken By, (c) = Cosigned By      Initials Name Provider Type    TB Alison Boogie SLP Speech and Language Pathologist                    Therapy Charges for Today       Code Description Service Date Service Provider Modifiers Qty    68022067346 HC ST EVAL ORAL PHARYNG SWALLOW 4 8/22/2024 Alison Boogie SLP GN 1    [id number] HC ST MOTION FLUORO EVAL SWALLOW 6 8/23/2024 Alison Boogie SLP GN 1                 SAM Wagoner  8/23/2024

## 2024-08-23 NOTE — THERAPY EVALUATION
Acute Care - Physical Therapy Initial Evaluation  [facility] Go     Patient Name: Isha Llanes  : 1965  MRN: [id number]  Today's Date: 2024      Visit Dx:     ICD-10-CM ICD-9-CM   1. Opioid overdose, undetermined intent, initial encounter  T40.2X4A 965.00     E980.0   2. Oropharyngeal dysphagia  R13.12 787.22   3. Difficulty walking  R26.2 719.7     Patient Active Problem List   Diagnosis    Septic shock    Acute MI    Cancer associated pain    Presence of intrathecal pump    Chronic low back pain with bilateral sciatica    Sacroiliac inflammation    Chronic pain syndrome    Pelvic pain    Aortic stenosis, severe    Dyspnea on effort    Other pulmonary embolism without acute cor pulmonale    Nonrheumatic aortic valve stenosis    Hip pain    Anxiety disorder    Allergic rhinitis due to allergen    Arthritis    Asthma    Kidney disease    CHF (congestive heart failure)    Chronic obstructive pulmonary disease    Diabetes mellitus, type 2    Depression    GERD (gastroesophageal reflux disease)    S/P TAVR (transcatheter aortic valve replacement)    Limited mobility    Venous hypertension of both lower extremities    Nicotine dependence    Mitral valve prolapse    Lupus (systemic lupus erythematosus)    Long term current use of anticoagulant therapy    History of Hodgkin's lymphoma    Hepatic steatosis    Heart murmur    CAD (coronary artery disease)    Non-healing wound of lower extremity, subsequent encounter    MSSA (methicillin susceptible Staphylococcus aureus) infection    Sepsis    Change in bowel habits    Nausea    Venous stasis ulcer of right calf    Venous stasis ulcer of left calf    Noncompliance    Peripheral vascular disease    Obesity with body mass index 30 or greater    Neurologic disorder    Neck pain    Limb pain    Hiatal hernia    Bladder disorder    Frailty    Cellulitis of right foot    Bowel disease    Atrophy of skin    Breast lump    Encounter for care related to vascular  access port    Primary osteoarthritis of right hip    Tobacco abuse    Laryngeal cancer    Throat pain    Voice hoarseness    Hodgkin lymphoma    Malignant neoplasm of supraglottis    Dysphagia    Nausea vomiting and diarrhea    Weight loss, abnormal    Epigastric pain    Nausea and vomiting    History of laryngeal cancer    Anorexia    AMS (altered mental status)    Acute-on-chronic respiratory failure    UTI (urinary tract infection)     Past Medical History:   Diagnosis Date    Anesthesia     REPORTS HAS GOT REAL EMOTIONAL AND HAS BECOME ANGRY BEFORE    Anxiety     Aortic stenosis, severe     HAS BIO VALVE REPLACED    Arthritis     Asthma     Back pain     Blood clotting disorder     Cancer     Cancer associated pain     HODGKINS LYMPHOMA    CHF (congestive heart failure)     Chronic low back pain with sciatica     Chronic pain disorder     COPD (chronic obstructive pulmonary disease)     Coronary artery disease     Diabetes mellitus     TYPE 2    Diabetes mellitus     Dyspnea on effort     GERD (gastroesophageal reflux disease)     H/O lumpectomy     H/O: hysterectomy     Hiatal hernia     History of degenerative disc disease     History of kidney stones     History of pulmonary embolus (PE)     History of transfusion     AS A CHILD NO TRANSFUSION REACTION    History of transfusion     Hodgkin's lymphoma     5/19/23  5 YEARS SINCE LAST CHEMO TX    Hypertension     Immobility     Liver disease     DENIES ANY CURRENT ISSUES    Lupus     Mitral valve prolapse     Nausea vomiting and diarrhea 3/7/2024    Panic disorder     Pelvic pain     Peripheral neuropathy     Personal history of DVT (deep vein thrombosis)     Presence of intrathecal pump     PTSD (post-traumatic stress disorder)     Sacroiliac joint disease     SI (sacroiliac) joint inflammation     Sleep apnea     Venous insufficiency      Past Surgical History:   Procedure Laterality Date    ABDOMINAL SURGERY      BACK SURGERY      SPINAL FUSION     BACK  SURGERY      BLADDER REPAIR      CARDIAC CATHETERIZATION N/A 03/06/2019    Procedure: Valvuloplasty;  Surgeon: Wayne Johnson MD;  Location: [facility] INVASIVE LOCATION;  Service: Cardiology    CARDIAC CATHETERIZATION      CARDIAC CATHETERIZATION Left 5/31/2023    Procedure: Cardiac Catheterization/Vascular Study;  Surgeon: Poncho Reid MD;  Location: [facility] CATH INVASIVE LOCATION;  Service: Cardiovascular;  Laterality: Left;    CARDIAC SURGERY      CARDIAC VALVE REPLACEMENT  2021    CHOLECYSTECTOMY      COLONOSCOPY N/A 12/29/2021    Procedure: COLONOSCOPY;  Surgeon: Karine Orlando MD;  Location: [facility] ENDOSCOPY;  Service: Gastroenterology;  Laterality: N/A;  POOR PREP    COLONOSCOPY N/A 04/13/2022    Procedure: COLONOSCOPY WITH POLYPECTOMY;  Surgeon: Karine Orlando MD;  Location: [facility] ENDOSCOPY;  Service: Gastroenterology;  Laterality: N/A;  COLON POLYP, HEMORRHOIDS, POOR PREP    COLONOSCOPY      DIRECT LARYNGOSCOPY, ESOPHAGOSCOPY, BRONCHOSCOPY N/A 5/22/2023    Procedure: DIRECT LARYNGOSCOPY WITH BIOPSIES , ESOPHAGOSCOPY, BRONCHOSCOPY;  Surgeon: Ronnie Mcgarry MD;  Location: [facility] OR OSC;  Service: ENT;  Laterality: N/A;    ENDOSCOPY N/A 12/29/2021    Procedure: ESOPHAGOGASTRODUODENOSCOPY;  Surgeon: Karine Orlando MD;  Location: [facility] ENDOSCOPY;  Service: Gastroenterology;  Laterality: N/A;  ESOPHAGITIS, GASTRITIS, HIATAL HERNIA    ENDOSCOPY      ENDOSCOPY N/A 4/16/2024    Procedure: ESOPHAGOGASTRODUODENOSCOPY WITH BIOPSIES;  Surgeon: Karine Orlando MD;  Location: [facility] ENDOSCOPY;  Service: Gastroenterology;  Laterality: N/A;  ESOPHAGITIS, HIATAL HERNIA    HYSTERECTOMY      LYMPHADENECTOMY      PAIN PUMP INSERTION/REVISION N/A 10/18/2019    Procedure: PAIN PUMP INSERTION [facility] 10-18-19 [place];  Surgeon: Horace Rios MD;  Location: [facility];  Service: Pain Management    PAIN PUMP INSERTION/REVISION N/A 11/23/2020    Procedure: pain  pump removal;  Surgeon: Horace Rios MD;  Location: [facility] MAIN OR;  Service: Pain Management;  Laterality: N/A;    PEG TUBE INSERTION N/A 7/26/2023    Procedure: PERCUTANEOUS ENDOSCOPIC GASTROSTOMY TUBE INSERTION;  Surgeon: Kody Mercer MD;  Location: [facility] ENDOSCOPY;  Service: General;  Laterality: N/A;  SUCCESSFUL PEG TUBE PLACE PLACEMENT    PORTACATH PLACEMENT      IN LEFT CHEST REPORTS THAT IT IS NOT FUNCTIONING    SKIN BIOPSY      TONSILLECTOMY      TUBAL ABDOMINAL LIGATION      TUMOR REMOVAL       PT Assessment (Last 12 Hours)       PT Evaluation and Treatment       Row Name 08/23/24 1400          Physical Therapy Time and Intention    Document Type evaluation  -AV     Mode of Treatment individual therapy;physical therapy  -AV       Row Name 08/23/24 1400          General Information    Patient Profile Reviewed yes  -AV     Prior Level of Function --  (I) with ADLs and stand pivot transfers to manual w/c, which she is able to self propel. No O2.  -AV     Equipment Currently Used at Home wheelchair  -AV     Existing Precautions/Restrictions fall  -AV       Row Name 08/23/24 1400          Living Environment    Current Living Arrangements home  -AV     People in Home child(felix), adult  Daughter  -AV       Row Name 08/23/24 1400          Cognition    Orientation Status (Cognition) oriented x 3  -AV       Row Name 08/23/24 1400          Range of Motion (ROM)    Range of Motion bilateral lower extremities;ROM is WFL  -AV       Row Name 08/23/24 1400          Bed Mobility    Bed Mobility supine-sit;sit-supine  -AV     Supine-Sit [place] (Bed Mobility) standby assist  -AV     Sit-Supine [place] (Bed Mobility) standby assist  -AV       Row Name 08/23/24 1400          Transfers    Transfers sit-stand transfer;stand-sit transfer  -AV     Comment, (Transfers) Patient stands with forward flexion, reports she is unable to stand fully upright  -AV       Row Name 08/23/24 1400          Sit-Stand Transfer     Sit-Stand [place] (Transfers) contact guard  -AV     Assistive Device (Sit-Stand Transfers) --  Bed rail  -AV       Row Name 08/23/24 1400          Stand-Sit Transfer    Stand-Sit [place] (Transfers) contact guard  -AV     Assistive Device (Stand-Sit Transfers) --  Bed rail  -AV       Row Name 08/23/24 1400          Gait/Stairs (Locomotion)    Comment, (Gait/Stairs) Patient is nonambulatory at baseline  -AV       Row Name 08/23/24 1400          Safety Issues, Functional Mobility    Impairments Affecting Function (Mobility) balance;endurance/activity tolerance;strength  -AV       Row Name 08/23/24 1400          Balance    Balance Assessment standing static balance  -AV     Static Standing Balance contact guard  -AV     Position/Device Used, Standing Balance supported  -AV       Row Name             Wound 08/22/24 0627 Right lower leg Other (comment)    Wound - Properties Group Placement Date: 08/22/24  -AW Placement Time: 0627  -AW Present on Original Admission: Y  -AW Side: Right  -AW Orientation: lower  -AW Location: leg  -AW Primary Wound Type: Other  -AW, open areas from where patient swells and weeps     Retired Wound - Properties Group Placement Date: 08/22/24  -AW Placement Time: 0627  -AW Present on Original Admission: Y  -AW Side: Right  -AW Orientation: lower  -AW Location: leg  -AW Primary Wound Type: Other  -AW, open areas from where patient swells and weeps     Retired Wound - Properties Group Date first assessed: 08/22/24  -AW Time first assessed: 0627  -AW Present on Original Admission: Y  -AW Side: Right  -AW Location: leg  -AW Primary Wound Type: Other  -AW, open areas from where patient swells and weeps       Row Name             Wound 08/22/24 0629 Left lower leg Other (comment)    Wound - Properties Group Placement Date: 08/22/24  -AW Placement Time: 0629  -AW Present on Original Admission: Y  -AW Side: Left  -AW Orientation: lower  -AW Location: leg  -AW Primary Wound Type: Other   -AW, open margaret where patient swells and weeps     Retired Wound - Properties Group Placement Date: 08/22/24  -AW Placement Time: 0629  -AW Present on Original Admission: Y  -AW Side: Left  -AW Orientation: lower  -AW Location: leg  -AW Primary Wound Type: Other  -AW, open margaret where patient swells and weeps     Retired Wound - Properties Group Date first assessed: 08/22/24  -AW Time first assessed: 0629  -AW Present on Original Admission: Y  -AW Side: Left  -AW Location: leg  -AW Primary Wound Type: Other  -AW, open margaret where patient swells and weeps       Row Name             Wound 08/22/24 1258 Left anterior foot Blisters    Wound - Properties Group Placement Date: 08/22/24  -KE Placement Time: 1258  -KE Side: Left  -KE Orientation: anterior  -KE Location: foot  -KE Primary Wound Type: Blisters  -KE    Retired Wound - Properties Group Placement Date: 08/22/24  -KE Placement Time: 1258  -KE Side: Left  -KE Orientation: anterior  -KE Location: foot  -KE Primary Wound Type: Blisters  -KE    Retired Wound - Properties Group Date first assessed: 08/22/24  -KE Time first assessed: 1258  -KE Side: Left  -KE Location: foot  -KE Primary Wound Type: Blisters  -KE      Row Name 08/23/24 1400          Plan of Care Review    Plan of Care Reviewed With patient  -AV     Progress no change  -AV     Outcome Evaluation Patient presents with deficits in balance, endurance, and transfers. Patient will benefit from skilled PT services to address these mobility deficits and decrease risk of falls.  -AV       Row Name 08/23/24 1400          Therapy Assessment/Plan (PT)    Rehab Potential (PT) good, to achieve stated therapy goals  -AV     Criteria for Skilled Interventions Met (PT) yes;meets criteria  -AV     Therapy Frequency (PT) daily  -AV     Predicted Duration of Therapy Intervention (PT) 10 days  -AV     Problem List (PT) problems related to;balance;mobility;strength  -AV     Activity Limitations Related to Problem List (PT)  unable to transfer safely  -AV       Row Name 08/23/24 1400          PT Evaluation Complexity    History, PT Evaluation Complexity 1-2 personal factors and/or comorbidities  -AV     Examination of Body Systems (PT Eval Complexity) total of 4 or more elements  -AV     Clinical Presentation (PT Evaluation Complexity) stable  -AV     Clinical Decision Making (PT Evaluation Complexity) low complexity  -AV     Overall Complexity (PT Evaluation Complexity) low complexity  -AV       Row Name 08/23/24 1400          Therapy Plan Review/Discharge Plan (PT)    Therapy Plan Review (PT) evaluation/treatment results reviewed;patient  -AV       Row Name 08/23/24 1400          Physical Therapy Goals    Bed Mobility Goal Selection (PT) bed mobility, PT goal 1  -AV     Transfer Goal Selection (PT) transfer, PT goal 1  -AV       Row Name 08/23/24 1400          Bed Mobility Goal 1 (PT)    Activity/Assistive Device (Bed Mobility Goal 1, PT) sit to supine/supine to sit  -AV     [place] Level/Cues Needed (Bed Mobility Goal 1, PT) modified independence  -AV     Time Frame (Bed Mobility Goal 1, PT) 10 days  -AV       Row Name 08/23/24 1400          Transfer Goal 1 (PT)    Activity/Assistive Device (Transfer Goal 1, PT) sit-to-stand/stand-to-sit;bed-to-chair/chair-to-bed  -AV     [place] Level/Cues Needed (Transfer Goal 1, PT) modified independence  -AV     Time Frame (Transfer Goal 1, PT) 10 days  -AV               User Key  (r) = Recorded By, (t) = Taken By, (c) = Cosigned By      Initials Name Provider Type    Madisyn Kuhn, RN Registered Nurse    Jessenia Sanchez, RN Registered Nurse    Chavo Thompson, PT Physical Therapist                    Physical Therapy Education       Title: PT OT SLP Therapies (In Progress)       Topic: Physical Therapy (In Progress)       Point: Mobility training (Done)       Learning Progress Summary             Patient Acceptance, E,TB, VU by AV at 8/23/2024 3638                          Point: Home exercise program (Not Started)       Learner Progress:  Not documented in this visit.              Point: Body mechanics (Done)       Learning Progress Summary             Patient Acceptance, E,TB, VU by AV at 8/23/2024 1418                         Point: Precautions (Done)       Learning Progress Summary             Patient Acceptance, E,TB, VU by AV at 8/23/2024 1418                                         User Key       Initials Effective Dates Name Provider Type Discipline    AV 06/11/21 -  Chavo Cunningham, PT Physical Therapist PT                  PT Recommendation and Plan  Anticipated Discharge Disposition (PT): home with home health  Planned Therapy Interventions (PT): balance training, bed mobility training, home exercise program, neuromuscular re-education, strengthening, transfer training  Therapy Frequency (PT): daily  Plan of Care Reviewed With: patient  Progress: no change  Outcome Evaluation: Patient presents with deficits in balance, endurance, and transfers. Patient will benefit from skilled PT services to address these mobility deficits and decrease risk of falls.   Outcome Measures       Row Name 08/23/24 1400             How much help from another person do you currently need...    Turning from your back to your side while in flat bed without using bedrails? 4  -AV      Moving from lying on back to sitting on the side of a flat bed without bedrails? 3  -AV      Moving to and from a bed to a chair (including a wheelchair)? 3  -AV      Standing up from a chair using your arms (e.g., wheelchair, bedside chair)? 3  -AV      Climbing 3-5 steps with a railing? 1  -AV      To walk in hospital room? 2  -AV      AM-PAC 6 Clicks Score (PT) 16  -AV      Highest Level of Mobility Goal 5 --> Static standing  -AV         Functional Assessment    Outcome Measure Options AM-PAC 6 Clicks Basic Mobility (PT)  -AV                User Key  (r) = Recorded By, (t) = Taken By, (c) = Cosigned By       Initials Name Provider Type    Chavo Thompson, PT Physical Therapist                     Time Calculation:    PT Charges       Row Name 08/23/24 1418             Time Calculation    PT Received On 08/23/24  -AV      PT Goal Re-Cert Due Date 09/01/24  -AV         Untimed Charges    PT Eval/Re-eval Minutes 34  -AV         Total Minutes    Untimed Charges Total Minutes 34  -AV       Total Minutes 34  -AV                User Key  (r) = Recorded By, (t) = Taken By, (c) = Cosigned By      Initials Name Provider Type    AV Chavo Cunningham, PT Physical Therapist                  Therapy Charges for Today       Code Description Service Date Service Provider Modifiers Qty    09064059101 HC PT EVAL LOW COMPLEXITY 3 8/23/2024 Chavo Cunningham, PT GP 1            PT G-Codes  Outcome Measure Options: AM-PAC 6 Clicks Basic Mobility (PT)  AM-PAC 6 Clicks Score (PT): 16    Chavo Cunningham, PT  8/23/2024

## 2024-08-23 NOTE — PLAN OF CARE
Problem: Adult Inpatient Plan of Care  Goal: Plan of Care Review  Outcome: Ongoing, Progressing  Goal: Optimal Comfort and Wellbeing  Outcome: Ongoing, Progressing     Problem: Fall Injury Risk  Goal: Absence of Fall and Fall-Related Injury  Outcome: Ongoing, Progressing  Intervention: Promote Injury-Free Environment  Recent Flowsheet Documentation  Taken 8/22/2024 2303 by Everardo Brar, RN  Safety Promotion/Fall Prevention:   nonskid shoes/slippers when out of bed   safety round/check completed  Taken 8/22/2024 2100 by Everardo Brar, RN  Safety Promotion/Fall Prevention:   nonskid shoes/slippers when out of bed   safety round/check completed     Problem: Pain Chronic (Persistent) (Comorbidity Management)  Goal: Acceptable Pain Control and Functional Ability  Outcome: Ongoing, Progressing   Goal Outcome Evaluation:   .  .  .        Beginning of shift patient was hard to arouse but would wake up and be A&Ox4. Patient still upset she received narcan in the ED. Patient upset her PRN xanax was not added to her mar. Per PTA meds, xanax Rx was only for 16 days prn qHS.     Patient with implanted pain pump: SynchroMed II. According to her medtronic device, patient gets daily dose and can give herself PRN boluses:    Daily dose (without bolus):  -Dilaudid 7.518mg/day  -Bupivacaine 0.31910am/day    4 boluses per day with lockout feature:  -0.500mg dilaudid  -0.86815ls bupivacaine

## 2024-08-23 NOTE — PLAN OF CARE
Goal Outcome Evaluation:  Plan of Care Reviewed With: patient        Progress: no change  Outcome Evaluation: pt c/o generalized back and neck pain, medicated per MAR, unrelieved with pain meds.  Patient stayed awake throughout the shift, axo x4.  Pt will receive Xanax 0.5mg nightly per MD order.  Voided spontaneously on BSC.  Pt reports blood tinged sputum.  Pt. on 2L [place] o2 sat at 98%.

## 2024-08-23 NOTE — PROGRESS NOTES
[facility] Progress Note      Patient Name:  Isha Llanes   MRN:  [id number]   :  1965   Date of Admission:  2024   Date of Service:  2024   PMD:  Katia Wright MD     Hospital Course:     58 y.o. female with past medical history of SLE, Hodgkin's lymphoma, type 2 diabetes, GERD, aortic valve replacement, squamous cell carcinoma and CHF presents to the ED due to altered mental status.  As per daughter, patient has an complaining of abdominal pain, nausea and worsening mental status throughout the day.  In addition patient has been having chills.  Patient states this type of symptoms happens to her before.  Patient was recently discharged on  for hypoxemic respiratory failure and acute encephalopathy.  In the ED, labs show temperature 99.8, heart rate 118 and blood pressure 119/53.  ABG shows pH of 7.4, pCO2 46.9, pO2 73.5.  Labs show sodium 137, creatinine 0.41, lactic 1.0, white blood cell 9.5 and hemoglobin 14.2.  X-ray shows no acute findings.  CT head was unremarkable.  Patient admitted to floors for further management.     Consultants:    Speech evaluation    Procedures:  CT chest  Chest x-ray  CT head    Anti-infectives:    Cefepime  Vancomycin    2024    Chief Complaint: Follow-up patient with fever chills cough and sore throat    Subjective:   Has hoarseness of voice discussed with the daughter in very detail answered all her questions  She has speech evaluation regularly.    2024    Chief Complaint: Follow-up patient with fever chills cough and sore throat    Subjective: Getting barium swallow for aspiration  Chest x-ray shows worsening of the bilateral infiltrate  On IV antibiotic  Extensively discussed with the daughter at the bedside.      Review Of Systems:  GENERAL: Complains of weakness and fatigue, no time is denies any weight loss appetite is normal.  HEENT:  Denies any rhinitis, no sore throat, no diplopia , hearing is  normal  Respiratory: Complains of shortness of breath , no sweating, complains of improving dyspnea on exertion , cough or sputum.  CVS:  Denies any chest pain , palpitation or syncope.  Gi:  No nausea, no vomiting, no diarrhea, no hematemesis , no melena , no rectal bleeding  :  No dysuria frequency , hematuria, no retention:  Musculoskeletal:  Denies any arthritis, or calf pain    Hematological:  No bleeding , no petechiae.  Skin:  Denies rash , pruritus , jaundice  Endocrine:  No polyuria , polydipsia , polyphagia.  CNS:  Denies any confusion , headache , or seizure disorder  Psychiatry:  No anxiety , or depression, no suicide ideation      Objective:  Vitals:    08/23/24 0731 08/23/24 0754 08/23/24 1223 08/23/24 1333   BP:  120/74 110/80    BP Location:  Right arm Right arm    Patient Position:  Sitting Sitting    Pulse: 83 78 74 101   Resp:  18 16 18   Temp:  98.2 °F (36.8 °C) 97.9 °F (36.6 °C)    TempSrc:  Oral Oral    SpO2: 95% 95% 92% 93%   Weight:       Height:              Physical Exam:  GENERAL APPEARANCE: Alert and oriented.  Appears comfortable.  No acute distress  HEENT: Atraumatic, normocephalic,  PERRLA, mucous membranes moist  NECK: supple; no JVD or thyromegaly noted  CARDIOVASCULAR: Regular rate and rhythm, no murmurs appreciated; no edema present in BLE   RESPIRATORY: Improving Harsh vesicular breathing with scattered rhonchi and crepitation audible bilaterally  GASTROINTESTINAL:  Abdomen  soft; bowel sounds present, non-tender, non-distended, no organomegaly, no CVA tenderness   EXTREMITIES: Pulses equal bilaterally   MUSCULOSKELETAL:  Good muscle strength noted no obvious deformity appreciat  PSYCH: Appropriate mood and affect  Neurologic:  Alert & oriented x 3.  Normal Mental status.  Normal Cranial Nervies.  EOMI.  EMILY.  Normal 5/5 muscular strength in both upper and lower extremities.  Normal sensation.  Normal and symmetric reflexes. Normal cerbellar function.  Normal Gait. Negative  "Babinski.    Labs Reviewed  CBC:    Lab Results   Component Value Date    WBC 6.83 08/22/2024    HGB 12.8 08/22/2024    HCT 41.3 08/22/2024    MCV 92.6 08/22/2024     CMP:    Lab Results   Component Value Date     08/22/2024    CO2 28.3 08/22/2024    GLUCOSE 105 (H) 08/22/2024    BUN 11 08/22/2024    CREATININE 0.46 (L) 08/22/2024    ALBUMIN 4.0 08/21/2024    CALCIUM 9.0 08/22/2024    AST 15 08/21/2024    ALT 7 08/21/2024     Lipis Panel:   Lab Results   Component Value Date    CHOL 142 01/12/2023    HDL 30 (L) 01/12/2023      INR:    Lab Results   Component Value Date    INR 1.09 02/05/2024     Cardiac:    Lab Results   Component Value Date    CKMB 115.10 (H) 05/26/2023     No results found for: \"BNP\"  Lactate:    Lab Results   Component Value Date    LACTATE 1.0 08/21/2024    LACTATE 1.0 08/21/2024    LACTATE 1.1 08/21/2024     Blood Culture:  @lastlabx(cult:2)@    Imaging:  CT Chest Without Contrast Diagnostic    Result Date: 8/22/2024  Narrative: CT ABDOMEN PELVIS WO CONTRAST, CT CHEST WO CONTRAST DIAGNOSTIC Date of Exam: 8/22/2024 2:47 PM EDT Indication: Abdominal pain. Shortness of breath, cancer Comparison: Chest CT 7/20/2024, PET/CT 5/6/2024, CT abdomen and pelvis 3/7/2024 Technique: Axial CT images were obtained of the chest, abdomen and pelvis without the administration of contrast. Reconstructed coronal and sagittal images were also obtained. Automated exposure control and iterative construction methods were used. Findings: CT CHEST: MEDIASTINUM: Heavy mitral annular calcification. There is an aortic valve prosthesis. Aortic and heart size are normal. No mass nor pericardial effusion. CORONARY ARTERIES: There is calcified atherosclerotic disease. LUNGS: Worsening areas of groundglass attenuation with some more dense consolidation within the right middle and lower lobe. Clearance of previous posterior left lower lobe airspace disease with new faint groundglass opacities within the anterior left lower " lobe. Findings are consistent with ongoing infectious/inflammatory process. There is a calcified right lower lobe granuloma. No suspicious nodule. There is moderate emphysema, stable. PLEURAL SPACE: No effusion, mass, nor pneumothorax. CT ABDOMEN AND PELVIS:  LIVER:  Unremarkable parenchyma without focal lesion. BILIARY/GALLBLADDER: Cholecystectomy SPLEEN:  Unremarkable PANCREAS:  Unremarkable ADRENAL:  Unremarkable KIDNEYS:  Unremarkable parenchyma with no solid mass identified. No obstruction.  There is a 2-3 mm calculus within the mid to upper right kidney, unchanged. GASTROINTESTINAL/MESENTERY:  No evidence of obstruction nor inflammation. AORTA/IVC:  Normal caliber. RETROPERITONEUM/LYMPH NODES:  Unremarkable REPRODUCTIVE: Hysterectomy BLADDER:  Unremarkable OSSEUS STRUCTURES: No acute process nor significant change identified.     Impression: Impression: 1.Worsening of right lung airspace disease with shifting left lung airspace disease consistent with ongoing infectious/inflammatory process. 2.No acute process in the abdomen or pelvis. 3.Other stable chronic findings. Electronically Signed: Werner Linares MD  8/22/2024 3:31 PM EDT  Workstation ID: [id number]    CT Abdomen Pelvis Without Contrast    Result Date: 8/22/2024  Narrative: CT ABDOMEN PELVIS WO CONTRAST, CT CHEST WO CONTRAST DIAGNOSTIC Date of Exam: 8/22/2024 2:47 PM EDT Indication: Abdominal pain. Shortness of breath, cancer Comparison: Chest CT 7/20/2024, PET/CT 5/6/2024, CT abdomen and pelvis 3/7/2024 Technique: Axial CT images were obtained of the chest, abdomen and pelvis without the administration of contrast. Reconstructed coronal and sagittal images were also obtained. Automated exposure control and iterative construction methods were used. Findings: CT CHEST: MEDIASTINUM: Heavy mitral annular calcification. There is an aortic valve prosthesis. Aortic and heart size are normal. No mass nor pericardial effusion. CORONARY ARTERIES: There is  calcified atherosclerotic disease. LUNGS: Worsening areas of groundglass attenuation with some more dense consolidation within the right middle and lower lobe. Clearance of previous posterior left lower lobe airspace disease with new faint groundglass opacities within the anterior left lower lobe. Findings are consistent with ongoing infectious/inflammatory process. There is a calcified right lower lobe granuloma. No suspicious nodule. There is moderate emphysema, stable. PLEURAL SPACE: No effusion, mass, nor pneumothorax. CT ABDOMEN AND PELVIS:  LIVER:  Unremarkable parenchyma without focal lesion. BILIARY/GALLBLADDER: Cholecystectomy SPLEEN:  Unremarkable PANCREAS:  Unremarkable ADRENAL:  Unremarkable KIDNEYS:  Unremarkable parenchyma with no solid mass identified. No obstruction.  There is a 2-3 mm calculus within the mid to upper right kidney, unchanged. GASTROINTESTINAL/MESENTERY:  No evidence of obstruction nor inflammation. AORTA/IVC:  Normal caliber. RETROPERITONEUM/LYMPH NODES:  Unremarkable REPRODUCTIVE: Hysterectomy BLADDER:  Unremarkable OSSEUS STRUCTURES: No acute process nor significant change identified.     Impression: Impression: 1.Worsening of right lung airspace disease with shifting left lung airspace disease consistent with ongoing infectious/inflammatory process. 2.No acute process in the abdomen or pelvis. 3.Other stable chronic findings. Electronically Signed: Werner Linares MD  8/22/2024 3:31 PM EDT  Workstation ID: [id number]    CT Head Without Contrast    Result Date: 8/21/2024  Narrative: CT HEAD WO CONTRAST Date of Exam: 8/21/2024 8:35 PM EDT Indication: Headache headache. Comparison: 6/13/2024 Technique: Axial CT images were obtained of the head without contrast administration.  Reconstructed coronal and sagittal images were also obtained. Automated exposure control and iterative construction methods were used. Findings: Gray-white matter differentiation is maintained without evidence of  an acute infarction. No intracranial mass or mass effect. No extra-axial mass or collection. The ventricles and sulci are normal in size and configuration. The posterior fossa appears normal. Sellar and suprasellar structures are normal. Orbital and paranasal soft tissues are normal. The paranasal sinuses, ethmoid air cells, and mastoid air cells are aerated. The bony calvarium appears intact. No acute fractures. No lytic or blastic bony diseases.     Impression: Impression: No acute intracranial pathology. Electronically Signed: Sony Escobedo MD  8/21/2024 8:39 PM EDT  Workstation ID: [id number]    XR Chest 1 View    Result Date: 8/21/2024  Narrative: XR CHEST 1 VW Date of Exam: 8/21/2024 4:51 PM EDT Indication: Weak/Dizzy/AMS triage protocol Comparison: 7/30/2024 Findings: No consolidation. No pneumothorax or pleural effusion. Left-sided infusion port with the tip in the superior vena cava. The clavicles are intact. No rib fractures. The visualized upper abdomen is normal.     Impression: No acute cardiopulmonary disease. Electronically Signed: Sony Escobedo MD  8/21/2024 5:10 PM EDT  Workstation ID: [id number]    XR Chest 1 View    Result Date: 7/30/2024  Narrative: XR CHEST 1 VW-  Date of exam: 7/30/2024, 3:28 A.M.  Indication: dyspnea  Comparison: 7/20/2024.  FINDINGS: A single AP (or PA) upright portable chest radiograph was performed. No cardiac enlargement is seen. No acute infiltrate is appreciated. Improved aeration of the lungs is suggested bilaterally since the prior exam. No pleural effusion or pneumothorax is identified. The patient has undergone transcatheter aortic valve replacement (TAVR), as before. Chronic calcified granulomatous disease involves the chest. The thoracic aorta is atherosclerotic and ectatic. There is no significant change in the left IJ central venous line with its distal tip in the expected lower superior vena cava (SVC). Degenerative changes involve the shoulders. Surgical clips are  projected over the left shoulder girdle, seen previously. No significant interval change is seen since the prior study (or studies).      Impression: No acute infiltrate is appreciated.    Please note that portions of this note were completed with a voice recognition program.   Electronically Signed By-Sony Hood MD On:7/30/2024 4:04 AM        Medications Reviewed:  ALPRAZolam, 0.5 mg, Oral, Nightly  azithromycin, 500 mg, Intravenous, Q24H  budesonide, 0.5 mg, Nebulization, BID - RT  cefepime, 2,000 mg, Intravenous, Q8H  clopidogrel, 75 mg, Oral, Daily  ipratropium-albuterol, 3 mL, Nebulization, 4x Daily - RT  mineral oil-hydrophilic petrolatum, 1 Application, Topical, Daily  nicotine, 1 patch, Transdermal, Q24H  predniSONE, 10 mg, Oral, Daily  sodium chloride, 10 mL, Intravenous, Q12H         Assessment/Plan:      AMS (altered mental status)  Medical decision making:    Bilateral healthcare associated pneumonia:  Follow cultures negative so far  Continue Zithromycin/cefepime  Continue bronchodilators    Squamous, 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  On speech evaluation:  Speech therapy  Follow-up with hematology oncology    Acute metabolic encephalopathy: Possibly drug-induced:  Resolved and improved      Status post AVR:  Continue to monitor    Chronic low back pain with sacroiliac inflammation:  On pain pump    Chronic pain syndrome:  Follow-up with pain clinic    Type 2 diabetes mellitus:  Blood sugar ACHS  Sliding scale    Major depression:  Continue SSRI    GERD:  Continue PPI    CAD:  Follow home      DVT Prophylaxis:    SCUDs    CODE STATUS:  Full code  Reason for continued hospitalization  and medical necessity:  Patient with bilateral pneumonia    Orders:  CBC  CMP  Cefepime  Vancomycin  Azithromycin  Blood cultures  Urine culture    Speech therapy    Time spent:  38+ minute not only  including face-to-face rounding putting in the orders writing the note and all the conversation reviewing  the records    This transcription was electronically signed.         Disposition: Home self-care    Diet: Puréed diet      Discussed with: With patient and daughter in very detail with RN      This has been electronically signed by:  _______________________________    Silvia Vela [email or web address]     At [facility], we believe that sharing information builds trust and better relationships. You are receiving this note because you recently visited [facility]. It is possible you will see health information before a provider has talked with you about it. This kind of information can be easy to misunderstand. To help you fully understand what it means for your health, we urge you to discuss this note with your provider.           Part of this note may be an electronic transcription/translation of spoken language to printed ,text using the Dragon Dictation System.

## 2024-08-24 PROCEDURE — 94761 N-INVAS EAR/PLS OXIMETRY MLT: CPT

## 2024-08-24 PROCEDURE — 25010000002 ONDANSETRON PER 1 MG: Performed by: STUDENT IN AN ORGANIZED HEALTH CARE EDUCATION/TRAINING PROGRAM

## 2024-08-24 PROCEDURE — 25810000003 LACTATED RINGERS PER 1000 ML: Performed by: INTERNAL MEDICINE

## 2024-08-24 PROCEDURE — 94799 UNLISTED PULMONARY SVC/PX: CPT

## 2024-08-24 PROCEDURE — 63710000001 PREDNISONE PER 5 MG: Performed by: STUDENT IN AN ORGANIZED HEALTH CARE EDUCATION/TRAINING PROGRAM

## 2024-08-24 PROCEDURE — 25810000003 SODIUM CHLORIDE 0.9 % SOLUTION: Performed by: INTERNAL MEDICINE

## 2024-08-24 PROCEDURE — 25010000002 CEFEPIME PER 500 MG: Performed by: STUDENT IN AN ORGANIZED HEALTH CARE EDUCATION/TRAINING PROGRAM

## 2024-08-24 PROCEDURE — 25010000002 AZITHROMYCIN PER 500 MG: Performed by: INTERNAL MEDICINE

## 2024-08-24 PROCEDURE — 94664 DEMO&/EVAL PT USE INHALER: CPT

## 2024-08-24 PROCEDURE — 99232 SBSQ HOSP IP/OBS MODERATE 35: CPT | Performed by: INTERNAL MEDICINE

## 2024-08-24 PROCEDURE — 25810000003 LACTATED RINGERS PER 1000 ML: Performed by: STUDENT IN AN ORGANIZED HEALTH CARE EDUCATION/TRAINING PROGRAM

## 2024-08-24 RX ORDER — SCOLOPAMINE TRANSDERMAL SYSTEM 1 MG/1
1 PATCH, EXTENDED RELEASE TRANSDERMAL
Status: DISCONTINUED | OUTPATIENT
Start: 2024-08-24 | End: 2024-08-25 | Stop reason: HOSPADM

## 2024-08-24 RX ORDER — ALPRAZOLAM 1 MG
1 TABLET ORAL 2 TIMES DAILY PRN
Status: DISCONTINUED | OUTPATIENT
Start: 2024-08-24 | End: 2024-08-25 | Stop reason: HOSPADM

## 2024-08-24 RX ORDER — HYDROCODONE BITARTRATE AND ACETAMINOPHEN 7.5; 325 MG/1; MG/1
1 TABLET ORAL EVERY 6 HOURS PRN
Status: DISCONTINUED | OUTPATIENT
Start: 2024-08-24 | End: 2024-08-25 | Stop reason: HOSPADM

## 2024-08-24 RX ADMIN — PREDNISONE 10 MG: 10 TABLET ORAL at 08:48

## 2024-08-24 RX ADMIN — CEFEPIME 2000 MG: 2 INJECTION, POWDER, FOR SOLUTION INTRAVENOUS at 03:28

## 2024-08-24 RX ADMIN — AZITHROMYCIN 500 MG: 500 INJECTION, POWDER, LYOPHILIZED, FOR SOLUTION INTRAVENOUS at 11:10

## 2024-08-24 RX ADMIN — Medication 10 ML: at 08:48

## 2024-08-24 RX ADMIN — WHITE PETROLATUM 1 APPLICATION: 1.75 OINTMENT TOPICAL at 11:10

## 2024-08-24 RX ADMIN — ACETAMINOPHEN 650 MG: 325 TABLET ORAL at 10:22

## 2024-08-24 RX ADMIN — HYDROCODONE BITARTRATE AND ACETAMINOPHEN 1 TABLET: 5; 325 TABLET ORAL at 00:41

## 2024-08-24 RX ADMIN — IPRATROPIUM BROMIDE AND ALBUTEROL SULFATE 3 ML: .5; 3 SOLUTION RESPIRATORY (INHALATION) at 19:45

## 2024-08-24 RX ADMIN — CLOPIDOGREL BISULFATE 75 MG: 75 TABLET ORAL at 08:48

## 2024-08-24 RX ADMIN — CALCIUM CARBONATE 2 TABLET: 500 TABLET, CHEWABLE ORAL at 05:16

## 2024-08-24 RX ADMIN — ONDANSETRON 4 MG: 2 INJECTION INTRAMUSCULAR; INTRAVENOUS at 15:40

## 2024-08-24 RX ADMIN — IPRATROPIUM BROMIDE AND ALBUTEROL SULFATE 3 ML: .5; 3 SOLUTION RESPIRATORY (INHALATION) at 06:50

## 2024-08-24 RX ADMIN — NICOTINE 1 PATCH: 21 PATCH, EXTENDED RELEASE TRANSDERMAL at 08:49

## 2024-08-24 RX ADMIN — ALPRAZOLAM 1 MG: 1 TABLET ORAL at 20:26

## 2024-08-24 RX ADMIN — BUDESONIDE 0.5 MG: 0.5 INHALANT ORAL at 06:50

## 2024-08-24 RX ADMIN — IPRATROPIUM BROMIDE AND ALBUTEROL SULFATE 3 ML: .5; 3 SOLUTION RESPIRATORY (INHALATION) at 14:24

## 2024-08-24 RX ADMIN — HYDROCODONE BITARTRATE AND ACETAMINOPHEN 1 TABLET: 7.5; 325 TABLET ORAL at 13:38

## 2024-08-24 RX ADMIN — CEFEPIME 2000 MG: 2 INJECTION, POWDER, FOR SOLUTION INTRAVENOUS at 19:52

## 2024-08-24 RX ADMIN — SODIUM CHLORIDE, POTASSIUM CHLORIDE, SODIUM LACTATE AND CALCIUM CHLORIDE 100 ML/HR: 600; 310; 30; 20 INJECTION, SOLUTION INTRAVENOUS at 05:12

## 2024-08-24 RX ADMIN — CALCIUM CARBONATE 2 TABLET: 500 TABLET, CHEWABLE ORAL at 15:40

## 2024-08-24 RX ADMIN — HYDROCODONE BITARTRATE AND ACETAMINOPHEN 1 TABLET: 5; 325 TABLET ORAL at 06:28

## 2024-08-24 RX ADMIN — SODIUM CHLORIDE, POTASSIUM CHLORIDE, SODIUM LACTATE AND CALCIUM CHLORIDE 50 ML/HR: 600; 310; 30; 20 INJECTION, SOLUTION INTRAVENOUS at 16:09

## 2024-08-24 RX ADMIN — BUDESONIDE 0.5 MG: 0.5 INHALANT ORAL at 19:45

## 2024-08-24 RX ADMIN — Medication 10 ML: at 20:26

## 2024-08-24 RX ADMIN — CEFEPIME 2000 MG: 2 INJECTION, POWDER, FOR SOLUTION INTRAVENOUS at 12:26

## 2024-08-24 RX ADMIN — ACETAMINOPHEN 650 MG: 325 TABLET ORAL at 17:51

## 2024-08-24 RX ADMIN — SODIUM CHLORIDE 40 ML: 9 INJECTION, SOLUTION INTRAVENOUS at 12:24

## 2024-08-24 NOTE — PLAN OF CARE
Goal Outcome Evaluation:  Plan of Care Reviewed With: patient        Progress: no change  Outcome Evaluation: Patient continues on iv antibiotics and fluids. she c/o of abdominal discomfort and nausea. PRN medications given. she also c/o back pain, prn pain medication given. MD notified that patient requested an increase in pain medications and something for sleep. New orders entered.

## 2024-08-24 NOTE — PROGRESS NOTES
[facility] Progress Note      Patient Name:  Isha Llanes   MRN:  [id number]   :  1965   Date of Admission:  2024   Date of Service:  2024   PMD:  Katia Wright MD     Hospital Course:     58 y.o. female with past medical history of SLE, Hodgkin's lymphoma, type 2 diabetes, GERD, aortic valve replacement, squamous cell carcinoma and CHF presents to the ED due to altered mental status.  As per daughter, patient has an complaining of abdominal pain, nausea and worsening mental status throughout the day.  In addition patient has been having chills.  Patient states this type of symptoms happens to her before.  Patient was recently discharged on  for hypoxemic respiratory failure and acute encephalopathy.  In the ED, labs show temperature 99.8, heart rate 118 and blood pressure 119/53.  ABG shows pH of 7.4, pCO2 46.9, pO2 73.5.  Labs show sodium 137, creatinine 0.41, lactic 1.0, white blood cell 9.5 and hemoglobin 14.2.  X-ray shows no acute findings.  CT head was unremarkable.  Patient admitted to floors for further management.     Consultants:    Speech evaluation    Procedures:  CT chest  Chest x-ray  CT head    Anti-infectives:    Cefepime  Vancomycin    2024    Chief Complaint: Follow-up patient with fever chills cough and sore throat    Subjective:   Has hoarseness of voice discussed with the daughter in very detail answered all her questions  She has speech evaluation regularly.    2024    Chief Complaint: Follow-up patient with fever chills cough and sore throat    Subjective: Getting barium swallow for aspiration  Chest x-ray shows worsening of the bilateral infiltrate  On IV antibiotic  Extensively discussed with the daughter at the bedside..    2024    Chief Complaint: Follow-up patient with fever chills cough and sore throat    Subjective:  Had barium swallow done which shows chronic aspiration plan discussed with the patient in detail she  will follow-up with Dr. Nunez as she is getting radiation treatment      Review Of Systems:  GENERAL: Complains of improving weakness and fatigue, no time is denies any weight loss appetite is normal.  HEENT:  Denies any rhinitis, no sore throat, no diplopia , hearing is normal  Respiratory: Complains of shortness of breath , no sweating, complains of improving dyspnea on exertion , cough or sputum.  CVS:  Denies any chest pain , palpitation or syncope.  Gi:  No nausea, no vomiting, no diarrhea, no hematemesis , no melena , no rectal bleeding  :  No dysuria frequency , hematuria, no retention:  Musculoskeletal:  Denies any arthritis, or calf pain    Hematological:  No bleeding , no petechiae.  Skin:  Denies rash , pruritus , jaundice  Endocrine:  No polyuria , polydipsia , polyphagia.  CNS:  Denies any confusion , headache , or seizure disorder  Psychiatry:  No anxiety , or depression, no suicide ideation      Objective:  Vitals:    08/24/24 0300 08/24/24 0648 08/24/24 0656 08/24/24 0728   BP:    96/66   BP Location:    Right arm   Patient Position:    Lying   Pulse:  98 93    Resp:  18 18 16   Temp:    98.6 °F (37 °C)   TempSrc: Oral   Oral   SpO2:  96%  (!) 89%   Weight:       Height:              Physical Exam:  GENERAL APPEARANCE: Alert and oriented.  Appears comfortable.  No acute distress  HEENT: Atraumatic, normocephalic,  PERRLA, mucous membranes moist  NECK: supple; no JVD or thyromegaly noted  CARDIOVASCULAR: Regular rate and rhythm, no murmurs appreciated; no edema present in BLE   RESPIRATORY: Improving Harsh vesicular breathing with scattered rhonchi and crepitation audible bilaterally  GASTROINTESTINAL:  Abdomen  soft; bowel sounds present, non-tender, non-distended, no organomegaly, no CVA tenderness   EXTREMITIES: Pulses equal bilaterally   MUSCULOSKELETAL:  Good muscle strength noted no obvious deformity appreciat  PSYCH: Appropriate mood and affect  Neurologic:  Alert & oriented x 3.  Normal  "Mental status.  Normal Cranial Nervies.  EOMI.  EMILY.  Normal 5/5 muscular strength in both upper and lower extremities.  Normal sensation.  Normal and symmetric reflexes. Normal cerbellar function.  Normal Gait. Negative Babinski.    Labs Reviewed  CBC:    Lab Results   Component Value Date    WBC 6.83 08/22/2024    HGB 12.8 08/22/2024    HCT 41.3 08/22/2024    MCV 92.6 08/22/2024     CMP:    Lab Results   Component Value Date     08/22/2024    CO2 28.3 08/22/2024    GLUCOSE 105 (H) 08/22/2024    BUN 11 08/22/2024    CREATININE 0.46 (L) 08/22/2024    ALBUMIN 4.0 08/21/2024    CALCIUM 9.0 08/22/2024    AST 15 08/21/2024    ALT 7 08/21/2024     Lipis Panel:   Lab Results   Component Value Date    CHOL 142 01/12/2023    HDL 30 (L) 01/12/2023      INR:    Lab Results   Component Value Date    INR 1.09 02/05/2024     Cardiac:    Lab Results   Component Value Date    CKMB 115.10 (H) 05/26/2023     No results found for: \"BNP\"  Lactate:    Lab Results   Component Value Date    LACTATE 1.0 08/21/2024    LACTATE 1.0 08/21/2024    LACTATE 1.1 08/21/2024     Blood Culture:  @lastlabx(cult:2)@    Imaging:  FL Video Swallow With Speech Single Contrast    Result Date: 8/23/2024  Narrative: FL VIDEO SWALLOW W SPEECH SINGLE-CONTRAST Date of Exam: 8/23/2024 9:30 AM EDT Indication: dysphagia. Comparison: FL video swallow with speech, 2/6/2024 Fluoroscopic Time: 01: 06 minutes Number of Images: 11 Technique: Various consistencies of barium (thin, nectar and honey thick liquid barium, applesauce of barium, barium pudding, herminia cracker with barium pudding) were administered to the patient with cine/video imaging.  Imaging assistance was provided to the speech pathologist and an image was saved. Findings: Fluoroscopic examination was performed in conjunction with speech pathology department. Various consistencies of barium were given to assess the swallow mechanism. Patient experienced tracheal aspiration with thin liquid barium " with no associated cough. Patient experienced laryngeal penetration with nectar thick liquid barium, and had 1 episode of deep laryngeal penetration to the level of the vocal cords, with no associated cough. There are degenerative changes in the C5-C7 cervical spine observed on exam. The exam was discussed in real-time by myself and the speech pathologist. Please consult speech pathology report for further details regarding exam.     Impression: Impression: 1. Silent tracheal aspiration with thin liquid barium 2. Laryngeal penetration with nectar thick liquid barium Please consult speech pathology report for further details regarding the exam and for dietary recommendations. Report dictated by: Liz Pisano  I have personally reviewed this case and agree with the findings above: Electronically Signed: Odilon Sheriff MD  8/23/2024 3:30 PM EDT  Workstation ID: [id number]    CT Chest Without Contrast Diagnostic    Result Date: 8/22/2024  Narrative: CT ABDOMEN PELVIS WO CONTRAST, CT CHEST WO CONTRAST DIAGNOSTIC Date of Exam: 8/22/2024 2:47 PM EDT Indication: Abdominal pain. Shortness of breath, cancer Comparison: Chest CT 7/20/2024, PET/CT 5/6/2024, CT abdomen and pelvis 3/7/2024 Technique: Axial CT images were obtained of the chest, abdomen and pelvis without the administration of contrast. Reconstructed coronal and sagittal images were also obtained. Automated exposure control and iterative construction methods were used. Findings: CT CHEST: MEDIASTINUM: Heavy mitral annular calcification. There is an aortic valve prosthesis. Aortic and heart size are normal. No mass nor pericardial effusion. CORONARY ARTERIES: There is calcified atherosclerotic disease. LUNGS: Worsening areas of groundglass attenuation with some more dense consolidation within the right middle and lower lobe. Clearance of previous posterior left lower lobe airspace disease with new faint groundglass opacities within the anterior left lower lobe.  Findings are consistent with ongoing infectious/inflammatory process. There is a calcified right lower lobe granuloma. No suspicious nodule. There is moderate emphysema, stable. PLEURAL SPACE: No effusion, mass, nor pneumothorax. CT ABDOMEN AND PELVIS:  LIVER:  Unremarkable parenchyma without focal lesion. BILIARY/GALLBLADDER: Cholecystectomy SPLEEN:  Unremarkable PANCREAS:  Unremarkable ADRENAL:  Unremarkable KIDNEYS:  Unremarkable parenchyma with no solid mass identified. No obstruction.  There is a 2-3 mm calculus within the mid to upper right kidney, unchanged. GASTROINTESTINAL/MESENTERY:  No evidence of obstruction nor inflammation. AORTA/IVC:  Normal caliber. RETROPERITONEUM/LYMPH NODES:  Unremarkable REPRODUCTIVE: Hysterectomy BLADDER:  Unremarkable OSSEUS STRUCTURES: No acute process nor significant change identified.     Impression: Impression: 1.Worsening of right lung airspace disease with shifting left lung airspace disease consistent with ongoing infectious/inflammatory process. 2.No acute process in the abdomen or pelvis. 3.Other stable chronic findings. Electronically Signed: Werner Linares MD  8/22/2024 3:31 PM EDT  Workstation ID: [id number]    CT Abdomen Pelvis Without Contrast    Result Date: 8/22/2024  Narrative: CT ABDOMEN PELVIS WO CONTRAST, CT CHEST WO CONTRAST DIAGNOSTIC Date of Exam: 8/22/2024 2:47 PM EDT Indication: Abdominal pain. Shortness of breath, cancer Comparison: Chest CT 7/20/2024, PET/CT 5/6/2024, CT abdomen and pelvis 3/7/2024 Technique: Axial CT images were obtained of the chest, abdomen and pelvis without the administration of contrast. Reconstructed coronal and sagittal images were also obtained. Automated exposure control and iterative construction methods were used. Findings: CT CHEST: MEDIASTINUM: Heavy mitral annular calcification. There is an aortic valve prosthesis. Aortic and heart size are normal. No mass nor pericardial effusion. CORONARY ARTERIES: There is calcified  atherosclerotic disease. LUNGS: Worsening areas of groundglass attenuation with some more dense consolidation within the right middle and lower lobe. Clearance of previous posterior left lower lobe airspace disease with new faint groundglass opacities within the anterior left lower lobe. Findings are consistent with ongoing infectious/inflammatory process. There is a calcified right lower lobe granuloma. No suspicious nodule. There is moderate emphysema, stable. PLEURAL SPACE: No effusion, mass, nor pneumothorax. CT ABDOMEN AND PELVIS:  LIVER:  Unremarkable parenchyma without focal lesion. BILIARY/GALLBLADDER: Cholecystectomy SPLEEN:  Unremarkable PANCREAS:  Unremarkable ADRENAL:  Unremarkable KIDNEYS:  Unremarkable parenchyma with no solid mass identified. No obstruction.  There is a 2-3 mm calculus within the mid to upper right kidney, unchanged. GASTROINTESTINAL/MESENTERY:  No evidence of obstruction nor inflammation. AORTA/IVC:  Normal caliber. RETROPERITONEUM/LYMPH NODES:  Unremarkable REPRODUCTIVE: Hysterectomy BLADDER:  Unremarkable OSSEUS STRUCTURES: No acute process nor significant change identified.     Impression: Impression: 1.Worsening of right lung airspace disease with shifting left lung airspace disease consistent with ongoing infectious/inflammatory process. 2.No acute process in the abdomen or pelvis. 3.Other stable chronic findings. Electronically Signed: Werner Linares MD  8/22/2024 3:31 PM EDT  Workstation ID: [id number]    CT Head Without Contrast    Result Date: 8/21/2024  Narrative: CT HEAD WO CONTRAST Date of Exam: 8/21/2024 8:35 PM EDT Indication: Headache headache. Comparison: 6/13/2024 Technique: Axial CT images were obtained of the head without contrast administration.  Reconstructed coronal and sagittal images were also obtained. Automated exposure control and iterative construction methods were used. Findings: Gray-white matter differentiation is maintained without evidence of an acute  infarction. No intracranial mass or mass effect. No extra-axial mass or collection. The ventricles and sulci are normal in size and configuration. The posterior fossa appears normal. Sellar and suprasellar structures are normal. Orbital and paranasal soft tissues are normal. The paranasal sinuses, ethmoid air cells, and mastoid air cells are aerated. The bony calvarium appears intact. No acute fractures. No lytic or blastic bony diseases.     Impression: Impression: No acute intracranial pathology. Electronically Signed: Sony Escobedo MD  8/21/2024 8:39 PM EDT  Workstation ID: [id number]    XR Chest 1 View    Result Date: 8/21/2024  Narrative: XR CHEST 1 VW Date of Exam: 8/21/2024 4:51 PM EDT Indication: Weak/Dizzy/AMS triage protocol Comparison: 7/30/2024 Findings: No consolidation. No pneumothorax or pleural effusion. Left-sided infusion port with the tip in the superior vena cava. The clavicles are intact. No rib fractures. The visualized upper abdomen is normal.     Impression: No acute cardiopulmonary disease. Electronically Signed: Sony Escobedo MD  8/21/2024 5:10 PM EDT  Workstation ID: [id number]    XR Chest 1 View    Result Date: 7/30/2024  Narrative: XR CHEST 1 VW-  Date of exam: 7/30/2024, 3:28 A.M.  Indication: dyspnea  Comparison: 7/20/2024.  FINDINGS: A single AP (or PA) upright portable chest radiograph was performed. No cardiac enlargement is seen. No acute infiltrate is appreciated. Improved aeration of the lungs is suggested bilaterally since the prior exam. No pleural effusion or pneumothorax is identified. The patient has undergone transcatheter aortic valve replacement (TAVR), as before. Chronic calcified granulomatous disease involves the chest. The thoracic aorta is atherosclerotic and ectatic. There is no significant change in the left IJ central venous line with its distal tip in the expected lower superior vena cava (SVC). Degenerative changes involve the shoulders. Surgical clips are projected  over the left shoulder girdle, seen previously. No significant interval change is seen since the prior study (or studies).      Impression: No acute infiltrate is appreciated.    Please note that portions of this note were completed with a voice recognition program.   Electronically Signed By-Sony Hood MD On:7/30/2024 4:04 AM        Medications Reviewed:  ALPRAZolam, 0.5 mg, Oral, Nightly  azithromycin, 500 mg, Intravenous, Q24H  budesonide, 0.5 mg, Nebulization, BID - RT  calcium carbonate, 1 tablet, Oral, Once  cefepime, 2,000 mg, Intravenous, Q8H  clopidogrel, 75 mg, Oral, Daily  ipratropium-albuterol, 3 mL, Nebulization, 4x Daily - RT  melatonin, 5 mg, Oral, Nightly  mineral oil-hydrophilic petrolatum, 1 Application, Topical, Daily  nicotine, 1 patch, Transdermal, Q24H  predniSONE, 10 mg, Oral, Daily  sodium chloride, 10 mL, Intravenous, Q12H         Assessment/Plan:      AMS (altered mental status)  Medical decision making:    Bilateral healthcare associated pneumonia: Possible aspiration pneumonia:  Follow cultures negative so far  Continue Zithromycin/cefepime  Continue bronchodilators   patient had barium swallow showing aspiration:  Speech therapy  Discussed with the patient in detail regarding future treatment plan    Squamous, 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  On speech evaluation:  Speech therapy  Follow-up with hematology oncology    Acute metabolic encephalopathy: Possibly drug-induced:  Resolved and improved      Status post AVR:  Continue to monitor    Chronic low back pain with sacroiliac inflammation:  On pain pump    Chronic pain syndrome:  Follow-up with pain clinic    Type 2 diabetes mellitus:  Blood sugar ACHS  Sliding scale    Major depression:  Continue SSRI    GERD:  Continue PPI    CAD:  Follow home      DVT Prophylaxis:    SCUDs    CODE STATUS:  Full code  Reason for continued hospitalization  and medical necessity:  Patient with bilateral  pneumonia    Orders:  CBC  CMP  Cefepime  Barium swallow  Speech therapy    Time spent:  36+ minute not only  including face-to-face rounding putting in the orders writing the note and all the conversation reviewing the records    This transcription was electronically signed.         Disposition: Home self-care    Diet: Puréed diet      Discussed with: With patient and daughter in very detail with RN      This has been electronically signed by:  _______________________________    Silvia Vela [email or web address]     At [facility], we believe that sharing information builds trust and better relationships. You are receiving this note because you recently visited [facility]. It is possible you will see health information before a provider has talked with you about it. This kind of information can be easy to misunderstand. To help you fully understand what it means for your health, we urge you to discuss this note with your provider.           Part of this note may be an electronic transcription/translation of spoken language to printed ,text using the Dragon Dictation System.

## 2024-08-25 ENCOUNTER — READMISSION MANAGEMENT (OUTPATIENT)
Dept: CALL CENTER | Facility: HOSPITAL | Age: 59
End: 2024-08-25
Payer: COMMERCIAL

## 2024-08-25 VITALS
HEART RATE: 94 BPM | RESPIRATION RATE: 18 BRPM | HEIGHT: 64 IN | TEMPERATURE: 98.1 F | DIASTOLIC BLOOD PRESSURE: 76 MMHG | WEIGHT: 144.84 LBS | OXYGEN SATURATION: 90 % | SYSTOLIC BLOOD PRESSURE: 110 MMHG | BODY MASS INDEX: 24.73 KG/M2

## 2024-08-25 PROCEDURE — 25810000003 SODIUM CHLORIDE 0.9 % SOLUTION: Performed by: INTERNAL MEDICINE

## 2024-08-25 PROCEDURE — 25010000002 CEFEPIME PER 500 MG: Performed by: STUDENT IN AN ORGANIZED HEALTH CARE EDUCATION/TRAINING PROGRAM

## 2024-08-25 PROCEDURE — 63710000001 PREDNISONE PER 5 MG: Performed by: STUDENT IN AN ORGANIZED HEALTH CARE EDUCATION/TRAINING PROGRAM

## 2024-08-25 PROCEDURE — 94799 UNLISTED PULMONARY SVC/PX: CPT

## 2024-08-25 PROCEDURE — 94664 DEMO&/EVAL PT USE INHALER: CPT

## 2024-08-25 PROCEDURE — 99239 HOSP IP/OBS DSCHRG MGMT >30: CPT | Performed by: INTERNAL MEDICINE

## 2024-08-25 PROCEDURE — 25010000002 AZITHROMYCIN PER 500 MG: Performed by: INTERNAL MEDICINE

## 2024-08-25 RX ORDER — ALPRAZOLAM 0.5 MG
1 TABLET ORAL 2 TIMES DAILY
Qty: 12 TABLET | Refills: 0 | Status: SHIPPED | OUTPATIENT
Start: 2024-08-25 | End: 2024-08-28

## 2024-08-25 RX ORDER — CEFDINIR 300 MG/1
300 CAPSULE ORAL 2 TIMES DAILY
Qty: 6 CAPSULE | Refills: 0 | Status: SHIPPED | OUTPATIENT
Start: 2024-08-25 | End: 2024-08-28

## 2024-08-25 RX ORDER — METRONIDAZOLE 500 MG/1
500 TABLET ORAL 3 TIMES DAILY
Qty: 12 TABLET | Refills: 0 | Status: SHIPPED | OUTPATIENT
Start: 2024-08-25 | End: 2024-08-29

## 2024-08-25 RX ORDER — METOPROLOL TARTRATE 25 MG/1
12.5 TABLET, FILM COATED ORAL 2 TIMES DAILY
Qty: 60 TABLET | Refills: 11 | Status: SHIPPED | OUTPATIENT
Start: 2024-08-25

## 2024-08-25 RX ADMIN — IPRATROPIUM BROMIDE AND ALBUTEROL SULFATE 3 ML: .5; 3 SOLUTION RESPIRATORY (INHALATION) at 02:10

## 2024-08-25 RX ADMIN — AZITHROMYCIN 500 MG: 500 INJECTION, POWDER, LYOPHILIZED, FOR SOLUTION INTRAVENOUS at 09:40

## 2024-08-25 RX ADMIN — PREDNISONE 10 MG: 10 TABLET ORAL at 08:01

## 2024-08-25 RX ADMIN — CEFEPIME 2000 MG: 2 INJECTION, POWDER, FOR SOLUTION INTRAVENOUS at 04:20

## 2024-08-25 RX ADMIN — CLOPIDOGREL BISULFATE 75 MG: 75 TABLET ORAL at 08:01

## 2024-08-25 RX ADMIN — ALPRAZOLAM 1 MG: 1 TABLET ORAL at 08:17

## 2024-08-25 RX ADMIN — BUDESONIDE 0.5 MG: 0.5 INHALANT ORAL at 07:35

## 2024-08-25 RX ADMIN — CALCIUM CARBONATE 2 TABLET: 500 TABLET, CHEWABLE ORAL at 00:56

## 2024-08-25 RX ADMIN — Medication 10 ML: at 08:08

## 2024-08-25 RX ADMIN — IPRATROPIUM BROMIDE AND ALBUTEROL SULFATE 3 ML: .5; 3 SOLUTION RESPIRATORY (INHALATION) at 07:35

## 2024-08-25 RX ADMIN — HYDROCODONE BITARTRATE AND ACETAMINOPHEN 1 TABLET: 7.5; 325 TABLET ORAL at 04:22

## 2024-08-25 RX ADMIN — NICOTINE 1 PATCH: 21 PATCH, EXTENDED RELEASE TRANSDERMAL at 08:01

## 2024-08-25 NOTE — PLAN OF CARE
Goal Outcome Evaluation:  Plan of Care Reviewed With: patient        Progress: improving  Outcome Evaluation: pt discharging home, self care.

## 2024-08-25 NOTE — DISCHARGE SUMMARY
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 female with past medical history of SLE, Hodgkin's lymphoma, type 2 diabetes, GERD, aortic valve replacement, squamous cell carcinoma and CHF presents to the ED due to altered mental status. As per daughter, patient has an complaining of abdominal pain, nausea and worsening mental status throughout the day. In addition patient has been having chills. Patient states this type of symptoms happens to her before. Patient was recently discharged on  for hypoxemic respiratory failure and acute encephalopathy. In the ED, labs show temperature 99.8, heart rate 118 and blood pressure 119/53. ABG shows pH of 7.4, pCO2 46.9, pO2 73.5. Labs show sodium 137, creatinine 0.41, lactic 1.0, white blood cell 9.5 and hemoglobin 14.2. X-ray shows no acute findings. CT head was unremarkable. Patient admitted to floors for further management.  Patient concerns for aspiration pneumonia, she was treated with IV antibiotics with good response.  Patient's cultures were negative, patient's mental status did improve to her baseline.  Upon my evaluation patient was requesting discharge home.  I did discuss with patient that she will need to follow-up with  ENT as an outpatient given her a lot of swelling which seems to be somewhat chronic from her previous radiation treatment.  Patient's diet was adjusted for her aspiration.  Patient will complete oral antibiotics.  Patient is discharged home today in stable condition.  Of note patient did have episode of short burst of SVT lasted roughly 3 seconds.  Patient will be started on beta-blocker for this.  Patient should follow-up with her PCP for further workup and treatment.  Patient is discharged home today in stable condition    Day of Discharge     Vital Signs:  Temp:  [98.1 °F (36.7 °C)-98.3 °F (36.8 °C)] 98.1 °F (36.7 °C)  Heart Rate:  [77-94] 94  Resp:  [18-19] 18  BP: (107-132)/(71-84) 110/76    Physical Exam:   GEN: No acute distress  HEENT: Moist mucous membranes  LUNGS: Equal chest rise bilaterally  CARDIAC: Regular rate and rhythm  NEURO: Moving all 4 extremities spontaneously  SKIN: No obvious breakdown    Discharge Details        Discharge Medications        New Medications        Instructions Start Date   cefdinir 300 MG capsule  Commonly known as: OMNICEF   300 mg, Oral, 2 Times Daily      metoprolol tartrate 25 MG tablet  Commonly known as: LOPRESSOR   12.5 mg, Oral, 2 Times Daily      metroNIDAZOLE 500 MG tablet  Commonly known as: Flagyl   500 mg, Oral, 3 Times Daily             Changes to Medications        Instructions Start Date   ALPRAZolam 0.5 MG tablet  Commonly known as: Xanax  What changed:   how much to take  when to take this  reasons to take this   1 mg, Oral, 2 Times Daily             Continue These Medications        Instructions Start Date   albuterol sulfate  (90 Base) MCG/ACT inhaler  Commonly known as: PROVENTIL HFA;VENTOLIN HFA;PROAIR HFA   2 puffs, Inhalation, Every 4 Hours PRN      albuterol 2.5 MG/0.5ML nebulizer solution  Commonly known as: PROVENTIL   2.5 mg, Nebulization, Every 4 Hours PRN      clopidogrel 75 MG tablet  Commonly known as: PLAVIX   75 mg, Oral, Daily       furosemide 40 MG tablet  Commonly known as: Lasix   40 mg, Oral, Daily      mupirocin 2 % ointment  Commonly known as: BACTROBAN   1 Application, Topical, 3 Times Daily      nicotine 21 MG/24HR patch  Commonly known as: NICODERM CQ   1 patch, Transdermal, Every 24 Hours      O2  Commonly known as: OXYGEN   Inhalation, Once      ondansetron ODT 4 MG disintegrating tablet  Commonly known as: ZOFRAN-ODT   4 mg, Translingual, Every 8 Hours PRN      pain patient supplied pump   Intrathecal, Continuous, Type of Medication: Hydromorphone 15 mg/ml and Bupivacaine 0.5 mg/ml Provider:Dr. Boudreaux Provider number: [phone] Basal Dose: Hydromorphone 7.518 mg/day Bupivacaine 0.08230 mg/day Pump capacity: 40ml ( MAX of Hydromorphone 9.456 mg/ day; Bupivacaine 0.12060 mg /day) Bolus Dose:Hydromorphone 0.500 mg, Bupivacaine 0.01906 mg Max activations: 4 per day Lockout 3 hours. 1 Bolus per every 3 hours  Next refill-  every 60 days 09/20/24  Alarm date- 09/27/24 Pump manufacture:[employer]      predniSONE 10 MG tablet  Commonly known as: DELTASONE   10 mg, Oral, Daily      Tylenol 325 MG tablet  Generic drug: acetaminophen   325 mg, Oral, Every 6 Hours PRN               Allergies   Allergen Reactions   • Amoxicillin Shortness Of Breath     Has tolerated Cefazolin, Ceftriaxone, and Cefepime -Jermaine Montez, [facility]   • Ceclor [Cefaclor] Shortness Of Breath     Has tolerated Cefazolin, Ceftriaxone, and Cefepime -Jermaine Montez, [facility]     • Penicillins Shortness Of Breath     Has tolerated Cefazolin, Ceftriaxone, and Cefepime -Jermaine Montez, [facility]   • Sulfa Antibiotics Rash   • Valium [Diazepam] Mental Status Change     DEPRESSED   • Ambien [Zolpidem] Mental Status Change   • Aspirin GI Intolerance   • Ativan [Lorazepam] Mental Status Change   • Benadryl [Diphenhydramine] Anxiety     AND MAKES HER HYPER   • Biaxin [Clarithromycin] Unknown - Low Severity   • Cephalexin Unknown - Low Severity   • Clindamycin Unknown - Low Severity   •  Compazine [Prochlorperazine Edisylate] Rash   • Contrast Dye (Echo Or Unknown Ct/Mr) Other (See Comments)     Caused pain in her arm   • Doxycycline Rash   • Nsaids GI Intolerance   • Phenergan [Promethazine Hcl] GI Intolerance   • Promethazine GI Intolerance   • Vancomycin Itching       Discharge Disposition:  Home or Self Care    Diet:  Hospital:  Diet Order   Procedures   • Diet: Regular/House; Texture: Mechanical Ground (NDD 2); Fluid Consistency: Honey Thick       Discharge Activity:       CODE STATUS:  Code Status and Medical Interventions: CPR (Attempt to Resuscitate); Full Support   Ordered at: 08/22/24 0102     Code Status (Patient has no pulse and is not breathing):    CPR (Attempt to Resuscitate)     Medical Interventions (Patient has pulse or is breathing):    Full Support       Future Appointments   Date Time Provider Department Center   8/26/2024  2:30 PM Chelo Villeda MD [facility] W C [facility]   8/29/2024 10:00 AM Isha Vu [facility] RO [facility]   8/30/2024 11:30 AM Goyo Nunez MD [facility] RO RAKEL [facility]   9/4/2024  2:00 PM Corinne Deluca APRN [facility] U ETWOOD [facility]   9/5/2024 10:00 AM Isha Vu [facility] RO [facility]   9/12/2024 10:00 AM Isha Vu [facility] RO [facility]   9/19/2024 10:00 AM Isha Vu [facility] RO [facility]   9/26/2024 10:00 AM Isha Vu [facility] RO [facility]   10/3/2024 10:00 AM Isha Vu [facility]   10/9/2024 11:30 AM Sobeida Espinosa APRN [facility] GE ETWH [facility]   10/10/2024 10:00 AM Isha Vu [facility]       Additional Instructions for the Follow-ups that You Need to Schedule       Discharge Follow-up with PCP   As directed       Currently Documented PCP:    Katia Wright MD    PCP Phone Number:    [phone]     Follow Up Details: 3 to 7 days        Discharge Follow-up with Specified Provider: ent; 2 Weeks   As directed      To: ent   Follow Up: 2 Weeks                Pertinent  and/or Most Recent Results     IMAGING:  FL  Video Swallow With Speech Single Contrast    Result Date: 8/23/2024  FL VIDEO SWALLOW W SPEECH SINGLE-CONTRAST Date of Exam: 8/23/2024 9:30 AM EDT Indication: dysphagia. Comparison: FL video swallow with speech, 2/6/2024 Fluoroscopic Time: 01: 06 minutes Number of Images: 11 Technique: Various consistencies of barium (thin, nectar and honey thick liquid barium, applesauce of barium, barium pudding, herminia cracker with barium pudding) were administered to the patient with cine/video imaging.  Imaging assistance was provided to the speech pathologist and an image was saved. Findings: Fluoroscopic examination was performed in conjunction with speech pathology department. Various consistencies of barium were given to assess the swallow mechanism. Patient experienced tracheal aspiration with thin liquid barium with no associated cough. Patient experienced laryngeal penetration with nectar thick liquid barium, and had 1 episode of deep laryngeal penetration to the level of the vocal cords, with no associated cough. There are degenerative changes in the C5-C7 cervical spine observed on exam. The exam was discussed in real-time by myself and the speech pathologist. Please consult speech pathology report for further details regarding exam.     Impression: 1. Silent tracheal aspiration with thin liquid barium 2. Laryngeal penetration with nectar thick liquid barium Please consult speech pathology report for further details regarding the exam and for dietary recommendations. Report dictated by: Liz Pisano  I have personally reviewed this case and agree with the findings above: Electronically Signed: Odilon Sheriff MD  8/23/2024 3:30 PM EDT  Workstation ID: [id number]    CT Chest Without Contrast Diagnostic    Result Date: 8/22/2024  CT ABDOMEN PELVIS WO CONTRAST, CT CHEST WO CONTRAST DIAGNOSTIC Date of Exam: 8/22/2024 2:47 PM EDT Indication: Abdominal pain. Shortness of breath, cancer Comparison: Chest CT 7/20/2024, PET/CT  5/6/2024, CT abdomen and pelvis 3/7/2024 Technique: Axial CT images were obtained of the chest, abdomen and pelvis without the administration of contrast. Reconstructed coronal and sagittal images were also obtained. Automated exposure control and iterative construction methods were used. Findings: CT CHEST: MEDIASTINUM: Heavy mitral annular calcification. There is an aortic valve prosthesis. Aortic and heart size are normal. No mass nor pericardial effusion. CORONARY ARTERIES: There is calcified atherosclerotic disease. LUNGS: Worsening areas of groundglass attenuation with some more dense consolidation within the right middle and lower lobe. Clearance of previous posterior left lower lobe airspace disease with new faint groundglass opacities within the anterior left lower lobe. Findings are consistent with ongoing infectious/inflammatory process. There is a calcified right lower lobe granuloma. No suspicious nodule. There is moderate emphysema, stable. PLEURAL SPACE: No effusion, mass, nor pneumothorax. CT ABDOMEN AND PELVIS:  LIVER:  Unremarkable parenchyma without focal lesion. BILIARY/GALLBLADDER: Cholecystectomy SPLEEN:  Unremarkable PANCREAS:  Unremarkable ADRENAL:  Unremarkable KIDNEYS:  Unremarkable parenchyma with no solid mass identified. No obstruction.  There is a 2-3 mm calculus within the mid to upper right kidney, unchanged. GASTROINTESTINAL/MESENTERY:  No evidence of obstruction nor inflammation. AORTA/IVC:  Normal caliber. RETROPERITONEUM/LYMPH NODES:  Unremarkable REPRODUCTIVE: Hysterectomy BLADDER:  Unremarkable OSSEUS STRUCTURES: No acute process nor significant change identified.     Impression: 1.Worsening of right lung airspace disease with shifting left lung airspace disease consistent with ongoing infectious/inflammatory process. 2.No acute process in the abdomen or pelvis. 3.Other stable chronic findings. Electronically Signed: Werner Linares MD  8/22/2024 3:31 PM EDT  Workstation ID:  [id number]    CT Abdomen Pelvis Without Contrast    Result Date: 8/22/2024  CT ABDOMEN PELVIS WO CONTRAST, CT CHEST WO CONTRAST DIAGNOSTIC Date of Exam: 8/22/2024 2:47 PM EDT Indication: Abdominal pain. Shortness of breath, cancer Comparison: Chest CT 7/20/2024, PET/CT 5/6/2024, CT abdomen and pelvis 3/7/2024 Technique: Axial CT images were obtained of the chest, abdomen and pelvis without the administration of contrast. Reconstructed coronal and sagittal images were also obtained. Automated exposure control and iterative construction methods were used. Findings: CT CHEST: MEDIASTINUM: Heavy mitral annular calcification. There is an aortic valve prosthesis. Aortic and heart size are normal. No mass nor pericardial effusion. CORONARY ARTERIES: There is calcified atherosclerotic disease. LUNGS: Worsening areas of groundglass attenuation with some more dense consolidation within the right middle and lower lobe. Clearance of previous posterior left lower lobe airspace disease with new faint groundglass opacities within the anterior left lower lobe. Findings are consistent with ongoing infectious/inflammatory process. There is a calcified right lower lobe granuloma. No suspicious nodule. There is moderate emphysema, stable. PLEURAL SPACE: No effusion, mass, nor pneumothorax. CT ABDOMEN AND PELVIS:  LIVER:  Unremarkable parenchyma without focal lesion. BILIARY/GALLBLADDER: Cholecystectomy SPLEEN:  Unremarkable PANCREAS:  Unremarkable ADRENAL:  Unremarkable KIDNEYS:  Unremarkable parenchyma with no solid mass identified. No obstruction.  There is a 2-3 mm calculus within the mid to upper right kidney, unchanged. GASTROINTESTINAL/MESENTERY:  No evidence of obstruction nor inflammation. AORTA/IVC:  Normal caliber. RETROPERITONEUM/LYMPH NODES:  Unremarkable REPRODUCTIVE: Hysterectomy BLADDER:  Unremarkable OSSEUS STRUCTURES: No acute process nor significant change identified.     Impression: 1.Worsening of right lung airspace  disease with shifting left lung airspace disease consistent with ongoing infectious/inflammatory process. 2.No acute process in the abdomen or pelvis. 3.Other stable chronic findings. Electronically Signed: Werner Linares MD  8/22/2024 3:31 PM EDT  Workstation ID: [id number]    CT Head Without Contrast    Result Date: 8/21/2024  CT HEAD WO CONTRAST Date of Exam: 8/21/2024 8:35 PM EDT Indication: Headache headache. Comparison: 6/13/2024 Technique: Axial CT images were obtained of the head without contrast administration.  Reconstructed coronal and sagittal images were also obtained. Automated exposure control and iterative construction methods were used. Findings: Gray-white matter differentiation is maintained without evidence of an acute infarction. No intracranial mass or mass effect. No extra-axial mass or collection. The ventricles and sulci are normal in size and configuration. The posterior fossa appears normal. Sellar and suprasellar structures are normal. Orbital and paranasal soft tissues are normal. The paranasal sinuses, ethmoid air cells, and mastoid air cells are aerated. The bony calvarium appears intact. No acute fractures. No lytic or blastic bony diseases.     Impression: No acute intracranial pathology. Electronically Signed: Sony Escobedo MD  8/21/2024 8:39 PM EDT  Workstation ID: [id number]    XR Chest 1 View    Result Date: 8/21/2024  XR CHEST 1 VW Date of Exam: 8/21/2024 4:51 PM EDT Indication: Weak/Dizzy/AMS triage protocol Comparison: 7/30/2024 Findings: No consolidation. No pneumothorax or pleural effusion. Left-sided infusion port with the tip in the superior vena cava. The clavicles are intact. No rib fractures. The visualized upper abdomen is normal.     No acute cardiopulmonary disease. Electronically Signed: Sony Escobedo MD  8/21/2024 5:10 PM EDT  Workstation ID: [id number]    XR Chest 1 View    Result Date: 7/30/2024  XR CHEST 1 VW-  Date of exam: 7/30/2024, 3:28 A.M.  Indication: dyspnea   Comparison: 7/20/2024.  FINDINGS: A single AP (or PA) upright portable chest radiograph was performed. No cardiac enlargement is seen. No acute infiltrate is appreciated. Improved aeration of the lungs is suggested bilaterally since the prior exam. No pleural effusion or pneumothorax is identified. The patient has undergone transcatheter aortic valve replacement (TAVR), as before. Chronic calcified granulomatous disease involves the chest. The thoracic aorta is atherosclerotic and ectatic. There is no significant change in the left IJ central venous line with its distal tip in the expected lower superior vena cava (SVC). Degenerative changes involve the shoulders. Surgical clips are projected over the left shoulder girdle, seen previously. No significant interval change is seen since the prior study (or studies).      No acute infiltrate is appreciated.    Please note that portions of this note were completed with a voice recognition program.   Electronically Signed By-Sony Hood MD On:7/30/2024 4:04 AM        LAB RESULTS:      Lab 08/22/24 0509 08/21/24 2208 08/21/24 1828 08/21/24 1803 08/21/24  1619   WBC 6.83  --   --   --  9.55   HEMOGLOBIN 12.8  --   --   --  14.2   HEMATOCRIT 41.3  --   --   --  45.4   PLATELETS 141  --   --   --  145   NEUTROS ABS 5.74  --   --   --  8.35*   IMMATURE GRANS (ABS) 0.03  --   --   --  0.03   LYMPHS ABS 0.47*  --   --   --  0.49*   MONOS ABS 0.54  --   --   --  0.64   EOS ABS 0.04  --   --   --  0.03   MCV 92.6  --   --   --  91.2   SED RATE  --  27  --   --   --    CRP  --  13.55*  --   --   --    LACTATE  --  1.0 1.0 1.1  --          Lab 08/22/24 0509 08/21/24 2208 08/21/24 1828 08/21/24  1619   SODIUM 138  --   --  137   POTASSIUM 4.0  --   --  4.1   CHLORIDE 100  --   --  98   CO2 28.3  --   --  29.6*   ANION GAP 9.7  --   --  9.4   BUN 11  --   --  10   CREATININE 0.46*  --  0.41* 0.66   EGFR 111.1  --   --  101.8   GLUCOSE 105*  --   --  141*   CALCIUM 9.0  --    --  9.5   MAGNESIUM  --   --   --  1.8   TSH  --  1.130  --   --          Lab 08/21/24  1619   TOTAL PROTEIN 7.1   ALBUMIN 4.0   GLOBULIN 3.1   ALT (SGPT) 7   AST (SGOT) 15   BILIRUBIN 0.4   ALK PHOS 66         Lab 08/22/24  0009 08/21/24  2208 08/21/24  1619   HSTROP T 26* 29* 25*                 Lab 08/21/24  1828   PH, ARTERIAL 7.431   PCO2, ARTERIAL 46.9*   PO2 ART 73.5*   O2 SATURATION ART 94.8*   FIO2 28   HCO3 ART 31.2*   BASE EXCESS ART 5.8*     Brief Urine Lab Results  (Last result in the past 365 days)        Color   Clarity   Blood   Leuk Est   Nitrite   Protein   CREAT   Urine HCG        08/21/24 1800 Yellow   Clear   Negative   Negative   Negative   Negative                 Microbiology Results (last 10 days)       Procedure Component Value - Date/Time    COVID PRE-OP / PRE-PROCEDURE SCREENING ORDER (NO ISOLATION) - Swab, Nasal Cavity [[id number]]  (Normal) Collected: 08/22/24 0325    Lab Status: Final result Specimen: Swab from Nasal Cavity Updated: 08/22/24 0427    Narrative:      The following orders were created for panel order COVID PRE-OP / PRE-PROCEDURE SCREENING ORDER (NO ISOLATION) - Swab, Nasal Cavity.  Procedure                               Abnormality         Status                     ---------                               -----------         ------                     COVID-19, FLU A/B, RSV P...[[id number]]  Normal              Final result                 Please view results for these tests on the individual orders.    COVID-19, FLU A/B, RSV PCR 1 HR TAT - Swab, Nasopharynx [[id number]]  (Normal) Collected: 08/22/24 0325    Lab Status: Final result Specimen: Swab from Nasopharynx Updated: 08/22/24 0427     COVID19 Not Detected     Influenza A PCR Not Detected     Influenza B PCR Not Detected     RSV, PCR Not Detected    Narrative:      Fact sheet for providers: https://www.fda.gov/media/876408/download    Fact sheet for patients: https://www.fda.gov/media/250646/download    Test performed  by PCR.    Blood Culture - Blood, Hand, Right [[id number]]  (Normal) Collected: 08/21/24 2208    Lab Status: Preliminary result Specimen: Blood from Hand, Right Updated: 08/24/24 2231     Blood Culture No growth at 3 days    Blood Culture - Blood, Hand, Right [[id number]]  (Normal) Collected: 08/21/24 2208    Lab Status: Preliminary result Specimen: Blood from Hand, Right Updated: 08/24/24 2231     Blood Culture No growth at 3 days              Results for orders placed during the hospital encounter of 05/26/23    Adult Transthoracic Echo Complete w/ Color, Spectral and Contrast if necessary per protocol    Interpretation Summary  •  Left ventricular ejection fraction appears to be 46 - 50%.  •  Left ventricular wall thickness is consistent with mild concentric hypertrophy.  •  Left ventricular diastolic function was indeterminate.  •  There is a TAVR valve present.  •  Moderate to severe tricuspid valve regurgitation is present.  •  Estimated right ventricular systolic pressure from tricuspid regurgitation is mildly elevated (35-45 mmHg).    There were no apparent intracardiac masses, vegetations or thrombi.      Time spent on Discharge including face to face service: Greater than 30 minutes      Electronically signed by Bull Davila MD, 08/25/24, 11:26 AM EDT.

## 2024-08-25 NOTE — SIGNIFICANT NOTE
08/25/24 1140   Physical Therapy Time and Intention   Session Not Performed   (Pt has orders for discharge shortly.)

## 2024-08-25 NOTE — PLAN OF CARE
Goal Outcome Evaluation:  Plan of Care Reviewed With: patient        Progress: improving  Outcome Evaluation: Pt calm and cooperative. Pt's VSS. Pt rested off and on overnight. Pt maintained of IV fluids and IV antibiotics as ordered. Pt's pain treated per MAR. Pt up to BSC frequently overnight. Pt alert and able to make needs known. Call light in reach and bed in lowest locked position. Pt has had no additional complaints so far.

## 2024-08-26 ENCOUNTER — TRANSITIONAL CARE MANAGEMENT TELEPHONE ENCOUNTER (OUTPATIENT)
Dept: CALL CENTER | Facility: HOSPITAL | Age: 59
End: 2024-08-26
Payer: COMMERCIAL

## 2024-08-26 ENCOUNTER — TELEPHONE (OUTPATIENT)
Dept: RADIATION ONCOLOGY | Facility: HOSPITAL | Age: 59
End: 2024-08-26
Payer: COMMERCIAL

## 2024-08-26 LAB
BACTERIA SPEC AEROBE CULT: NORMAL
BACTERIA SPEC AEROBE CULT: NORMAL

## 2024-08-26 NOTE — OUTREACH NOTE
Call Center TCM Note      Flowsheet Row Responses   [facility] patient discharged from? [facility]   Does the patient have one of the following disease processes/diagnoses(primary or secondary)? Other   TCM attempt successful? No  [VR daughter]   Unsuccessful attempts Attempt 1            Emily Birmingham RN    8/26/2024, 09:44 EDT

## 2024-08-26 NOTE — OUTREACH NOTE
Call Center TCM Note      Flowsheet Row Responses   [facility] patient discharged from? Go   Does the patient have one of the following disease processes/diagnoses(primary or secondary)? Other   TCM attempt successful? Yes   Call start time 1522   Call end time 1539   Discharge diagnosis AMS (altered mental status)   Meds reviewed with patient/caregiver? Yes   Is the patient having any side effects they believe may be caused by any medication additions or changes? No   Does the patient have all medications ordered at discharge? Yes   Is the patient taking all medications as directed (includes completed medication regime)? No   What is preventing the patient from taking all medications as directed? Other   Nursing Interventions Nurse notified pharmacy for assistance   Medication comments Pt reports she does not have metoprolol. Call placed to pharm they report it is ready.   Comments No available HOSP DC FU appts that meet TCm guidelines.   Does the patient have an appointment with their PCP within 7-14 days of discharge? No appointments available   Nursing Interventions Routed TCM call to PCP office   Has home health visited the patient within 72 hours of discharge? N/A   Psychosocial issues? No   TCM call completed? Yes   Wrap up additional comments Pt had stated that she did not get metoprolol and that she wants it transfered to another pharm. Advised that This RN will call pharm to see if they received orders for med and will call Pt back. Call place to [facility] for [place] and they have med ready. Call placed back to Pt and no answer after multiple attempts. Message left explaining pharm has med and she will need to call whatever pharm she wants it to go to and have them call [facility]. ALso that Pt needs to make PCP appt.   Call end time 1539            Emily Birmingham RN    8/26/2024, 15:40 EDT

## 2024-08-26 NOTE — TELEPHONE ENCOUNTER
Diagnosis: History of head and neck cancer     Reason for Referral: Transportation    Content of Visit: OSW assistance requested by Evi ROBERTSON MA in radiation oncology, advising that Ms. Llanes had missed her follow-up appointment due to being hospitalized. Her follow-up appointment has been rescheduled for Friday, 8/30/24 at 1130. OSW contacted [employer] to arrange her transportation to this visit with an arrival time of 1115 - confirmation #[id number], return #[phone]. OSW also went ahead and arranged her transportation for her urology consult on Wednesday, 9/4/24 at 1400 with an arrival time of 1345 - confirmation #[id number], return #[id number]. OSW support remains available.     Resources/Referrals Provided: [employer]

## 2024-08-28 ENCOUNTER — TELEPHONE (OUTPATIENT)
Dept: CASE MANAGEMENT | Facility: OTHER | Age: 59
End: 2024-08-28
Payer: COMMERCIAL

## 2024-08-28 NOTE — TELEPHONE ENCOUNTER
RN called patient for post hospital follow up. No answer, unable to leave message. Patient was seen out in community on power wheelchair by this rn. Will attempt call again.     Alejandrina HARRIS RN  Ambulatory

## 2024-08-29 ENCOUNTER — HOSPITAL ENCOUNTER (OUTPATIENT)
Dept: RADIATION ONCOLOGY | Facility: HOSPITAL | Age: 59
Setting detail: RADIATION/ONCOLOGY SERIES
Discharge: HOME | End: 2024-08-29
Payer: COMMERCIAL

## 2024-08-29 ENCOUNTER — OFFICE VISIT (OUTPATIENT)
Dept: RADIATION ONCOLOGY | Facility: HOSPITAL | Age: 59
End: 2024-08-29
Payer: COMMERCIAL

## 2024-08-29 VITALS
HEART RATE: 97 BPM | SYSTOLIC BLOOD PRESSURE: 108 MMHG | OXYGEN SATURATION: 90 % | DIASTOLIC BLOOD PRESSURE: 63 MMHG | RESPIRATION RATE: 18 BRPM | TEMPERATURE: 97.6 F

## 2024-08-29 DIAGNOSIS — C32.1 MALIGNANT NEOPLASM OF SUPRAGLOTTIS: Primary | ICD-10-CM

## 2024-08-29 DIAGNOSIS — R13.12 OROPHARYNGEAL DYSPHAGIA: Primary | ICD-10-CM

## 2024-08-29 DIAGNOSIS — R40.0 SOMNOLENCE, DAYTIME: ICD-10-CM

## 2024-08-29 PROCEDURE — 92526 ORAL FUNCTION THERAPY: CPT | Performed by: SPEECH-LANGUAGE PATHOLOGIST

## 2024-08-29 PROCEDURE — G0463 HOSPITAL OUTPT CLINIC VISIT: HCPCS | Performed by: RADIOLOGY

## 2024-08-29 NOTE — PROGRESS NOTES
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  She continues to smoke 1 to 1.5 packs/day.  She wishes undergo evaluation by a neurologist regarding somnolence that she states is not related to her use of opioid analgesics.     Review of Systems: Review of Systems   Constitutional:  Positive for appetite change (Decreased, has an altered taste.) and fatigue (9/10). Negative for unexpected weight change.   HENT:  Positive for tinnitus (Occasional, ongoing), trouble swallowing (Occasional, with certain foods, dry foods.) and voice change (hoarseness, feels its worsened). Negative for congestion, rhinorrhea, sinus pressure, sinus pain and sore throat.         States feels like her ears are dry and itchy.  C/o thick secretions/saliva  Altered taste/smell  States feels like her throat and neck are swollen.     Eyes:  Positive for visual disturbance (Occasional blurred and double vision, ongoing).   Respiratory:  Positive for cough (occasional), choking (Noted with dry foods) and shortness of breath (occasional, ongoing). Negative for chest tightness.         States that sometimes she has difficulty catching her breath, states feels like it goes along with her throat.     Cardiovascular:  Leg swelling: bilateral, states from venous stasis.   Gastrointestinal:  Positive for constipation (Educated on stool softeners/fiber.  Last bm yesterday.), diarrhea (Occasional, takes imodium prn) and nausea (Frequent). Negative for abdominal pain, anal bleeding, blood in stool and vomiting.        States that she feels like she has fluid in her abdomen.  States she feels like her abdomen will  gurgle from where the tube was removed.     Genitourinary:  Positive for difficulty urinating (occasional hesitancy). Negative for dysuria, frequency, hematuria and urgency.   Musculoskeletal:  Positive for arthralgias (Right hip and generalized pain), back pain, gait problem (States she has to bear crawl.) and neck pain (right side). Negative for neck stiffness.   Skin:  Positive for wound (venous stasis on bilateral legs). Negative for color change and rash.        Wound (diabetic Ulcer BLE)   Neurological:  Positive for speech difficulty, weakness (Generalized, does not have strength) and headaches (becoming daily). Negative for dizziness and light-headedness.        Falling asleep while asking questions and in the middle of speaking, Pt leaning over and forward in wc  aroused multiple times to lean back in the wc and pt becomes agitated.       Psychiatric/Behavioral:  Positive for agitation, decreased concentration and sleep disturbance (Due to pain).        The following portions of the patient's history were reviewed and updated as appropriate: allergies, current medications, past family history, past medical history, past social history, past surgical history and problem list.    Medications:     Current Outpatient Medications:     acetaminophen (Tylenol) 325 MG tablet, Take 1 tablet by mouth Every 6 (Six) Hours As Needed for Mild Pain, Headache or Fever., Disp: , Rfl:     clopidogrel (PLAVIX) 75 MG tablet, Take 1 tablet by mouth Daily., Disp: 90 tablet, Rfl: 2    mupirocin (BACTROBAN) 2 % ointment, Apply 1 Application topically to the appropriate area as directed 3 (Three) Times a Day., Disp: 30 g, Rfl: 2    ondansetron ODT (ZOFRAN-ODT) 4 MG disintegrating tablet, Place 1 tablet on the tongue Every 8 (Eight) Hours As Needed for Nausea or Vomiting., Disp: 14 tablet, Rfl: 0    pain patient supplied pump, by Intrathecal route Continuous. Type of Medication: Hydromorphone 15 mg/ml and Bupivacaine 0.5 mg/ml  Provider:Dr. Boudreaux Provider number: [phone] Basal Dose: Hydromorphone 7.518 mg/day Bupivacaine 0.78416 mg/day Pump capacity: 40ml ( MAX of Hydromorphone 9.456 mg/ day; Bupivacaine 0.92956 mg /day) Bolus Dose:Hydromorphone 0.500 mg, Bupivacaine 0.78849 mg Max activations: 4 per day Lockout 3 hours. 1 Bolus per every 3 hours  Next refill-  every 60 days 09/20/24  Alarm date- 09/27/24 Pump manufacture:[employer], Disp: , Rfl:     predniSONE (DELTASONE) 10 MG tablet, Take 1 tablet by mouth Daily for 90 days., Disp: 30 tablet, Rfl: 2    albuterol (PROVENTIL) 2.5 MG/0.5ML nebulizer solution, Take 2.5 mg by nebulization Every 4 (Four) Hours As Needed for Wheezing or Shortness of Air. (Patient not taking: Reported on 8/29/2024), Disp: 20 mL, Rfl: 0    albuterol sulfate  (90 Base) MCG/ACT inhaler, Inhale 2 puffs Every 4 (Four) Hours As Needed for Wheezing. (Patient not taking: Reported on 8/29/2024), Disp: 18 g, Rfl: 2    furosemide (Lasix) 40 MG tablet, Take 1 tablet by mouth Daily. (Patient not taking: Reported on 8/29/2024), Disp: , Rfl:     metoprolol tartrate (LOPRESSOR) 25 MG tablet, Take 0.5 tablets by mouth 2 (Two) Times a Day. (Patient not taking: Reported on 8/29/2024), Disp: 60 tablet, Rfl: 11    metroNIDAZOLE (Flagyl) 500 MG tablet, Take 1 tablet by mouth 3 (Three) Times a Day for 4 days. (Patient not taking: Reported on 8/29/2024), Disp: 12 tablet, Rfl: 0    nicotine (NICODERM CQ) 21 MG/24HR patch, Place 1 patch on the skin as directed by provider Daily. (Patient not taking: Reported on 8/29/2024), Disp: , Rfl:     O2 (OXYGEN), Inhale 1 (One) Time. (Patient not taking: Reported on 8/29/2024), Disp: , Rfl:     Allergies:   Allergies   Allergen Reactions    Amoxicillin Shortness Of Breath     Has tolerated Cefazolin, Ceftriaxone, and Cefepime -Jermaine Montez RPH    Ceclor [Cefaclor] Shortness Of Breath     Has tolerated Cefazolin, Ceftriaxone, and Cefepime -Jermaine Montez RP      Penicillins  Shortness Of Breath     Has tolerated Cefazolin, Ceftriaxone, and Cefepime -Jermaine Melida, [facility]    Sulfa Antibiotics Rash    Valium [Diazepam] Mental Status Change     DEPRESSED    Ambien [Zolpidem] Mental Status Change    Aspirin GI Intolerance    Ativan [Lorazepam] Mental Status Change    Benadryl [Diphenhydramine] Anxiety     AND MAKES HER HYPER    Biaxin [Clarithromycin] Unknown - Low Severity    Cephalexin Unknown - Low Severity    Clindamycin Unknown - Low Severity    Compazine [Prochlorperazine Edisylate] Rash    Contrast Dye (Echo Or Unknown Ct/Mr) Other (See Comments)     Caused pain in her arm    Doxycycline Rash    Nsaids GI Intolerance    Phenergan [Promethazine Hcl] GI Intolerance    Promethazine GI Intolerance    Vancomycin Itching       ECOG: (2) Ambulatory and capable of self care, unable to carry out work activity, up and about > 50% or waking hours  Quality of Life: 40 - Must Use Wheelchair Most of Time     Objective     Physical Exam:   Vital Signs:   Vitals:    08/29/24 1051   BP: 108/63   Pulse: 97   Resp: 18   Temp: 97.6 °F (36.4 °C)   TempSrc: Temporal   SpO2: 90%   PainSc:   7   PainLoc: Back     There is no height or weight on file to calculate BMI.     Physical Exam  Constitutional:       General: She is not in acute distress.     Appearance: Normal appearance. She is not toxic-appearing.   HENT:      Head: Normocephalic and atraumatic.   Pulmonary:      Effort: Pulmonary effort is normal. No respiratory distress.   Neurological:      General: No focal deficit present.      Mental Status: She is alert and oriented to person, place, and time.      Cranial Nerves: No cranial nerve deficit.   Psychiatric:         Mood and Affect: Mood normal.         Behavior: Behavior normal.         Judgment: Judgment normal.                 Radiographs: FL Video Swallow With Speech Single Contrast    Result Date: 8/23/2024  Impression: 1. Silent tracheal aspiration with thin liquid barium 2. Laryngeal  penetration with nectar thick liquid barium Please consult speech pathology report for further details regarding the exam and for dietary recommendations. Report dictated by: Liz Aliya  I have personally reviewed this case and agree with the findings above: Electronically Signed: Odilon Sheriff MD  8/23/2024 3:30 PM EDT  Workstation ID: [id number]    CT Chest Without Contrast Diagnostic    Result Date: 8/22/2024  Impression: 1.Worsening of right lung airspace disease with shifting left lung airspace disease consistent with ongoing infectious/inflammatory process. 2.No acute process in the abdomen or pelvis. 3.Other stable chronic findings. Electronically Signed: Werner Linares MD  8/22/2024 3:31 PM EDT  Workstation ID: [id number]    CT Abdomen Pelvis Without Contrast    Result Date: 8/22/2024  Impression: 1.Worsening of right lung airspace disease with shifting left lung airspace disease consistent with ongoing infectious/inflammatory process. 2.No acute process in the abdomen or pelvis. 3.Other stable chronic findings. Electronically Signed: Werner Linares MD  8/22/2024 3:31 PM EDT  Workstation ID: [id number]    CT Head Without Contrast    Result Date: 8/21/2024  Impression: No acute intracranial pathology. Electronically Signed: Sony Escobedo MD  8/21/2024 8:39 PM EDT  Workstation ID: [id number]    XR Chest 1 View    Result Date: 8/21/2024  No acute cardiopulmonary disease. Electronically Signed: Sony Escobedo MD  8/21/2024 5:10 PM EDT  Workstation ID: [id number]    XR Chest 1 View    Result Date: 7/30/2024  No acute infiltrate is appreciated.    Please note that portions of this note were completed with a voice recognition program.   Electronically Signed By-Sony Hood MD On:7/30/2024 4:04 AM      CT Chest Without Contrast Diagnostic    Result Date: 7/20/2024  Impression: 1. Patchy areas of groundglass opacity in the lung fields likely infectious or inflammatory process. Recommend a follow-up examination after  treatment to confirm resolution. 2. Emphysema. Electronically Signed: José Cloud MD  7/20/2024 10:56 AM EDT  Workstation ID: [id number]    CT Soft Tissue Neck Without Contrast    Result Date: 7/20/2024  Impression: Examination is limited due to lack of IV contrast administration. Nonspecific prominence of the vocal folds and aryepiglottic folds, possibly related to post therapeutic change or underlying mass. Please correlate clinically to exclude laryngitis/laryngeal edema. Direct visualization may be beneficial. Further evaluation with contrast-enhanced CT may also be considered. Sclerosis within the C5-C7 vertebral bodies is similar since 10/25/2023. Electronically Signed: Jevon Baez MD  7/20/2024 10:40 AM EDT  Workstation ID: [id number]    XR Chest 1 View    Result Date: 7/20/2024  No active disease. Electronically Signed: José Cloud MD  7/20/2024 8:12 AM EDT  Workstation ID: [id number]    CT Chest Without Contrast Diagnostic    Result Date: 6/13/2024  1. Exam is degraded by motion and streak artifact from the patient's arms. 2. Tree-in-bud nodular opacities in the right middle lobe are likely infectious or inflammatory. Attention on 3-month follow-up chest CT is recommended. 3. Pulmonary emphysema. 4. Additional nonacute findings as above Electronically Signed: Des Carlson MD  6/13/2024 1:43 AM EDT  Workstation ID: [id number]    CT Head Without Contrast    Result Date: 6/13/2024  Motion-degraded exam. No definite acute findings. Electronically Signed: Des Carlson MD  6/13/2024 1:34 AM EDT  Workstation ID: [id number]    XR Chest 1 View    Result Date: 6/12/2024  No acute cardiopulmonary findings. 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  She has involvement of the  laryngeal side of the epiglottis.  She is status post external beam radiotherapy, having received 70 Pollack in 35 daily fractions over the course of 66 elapsed days.  She continues to report some throat/neck pain.    Given the findings of her most recent CT scan of the soft tissue neck with uncertainty regarding her tumor status, I have recommended PET/CT.  She has not undergone repeat evaluation by ENT as she requires potential exam under anesthesia.  I will see her in follow-up after the PET/CT is performed.  We discussed the importance of smoking cessation as a component of her recovery.      Goyo Nunez MD  Radiation Oncology  [facility]    This document has been signed by Goyo Nunez MD on August 29, 2024 15:04 EDT

## 2024-08-29 NOTE — THERAPY TREATMENT NOTE
Outpatient Adult Speech Language Therapy                                                                                           Visit Date: 2024 Treatment Note     Patient: Isha Llanes                                                                                      :     1965    Referring practitioner:    No Known Provider  Date of Initial Visit:          24     Patient seen for 2 sessions     Visit Diagnoses:  Visit Diagnosis       ICD-10-CM ICD-9-CM   1. Oropharyngeal dysphagia  R13.12 787.22         SUBJECTIVE   Patient presents to speech language pathology outpatient today secondary to dysphagia.  She is receiving swallow treatment to increase safety of swallow by increasing laryngeal elevation to promote epiglottic inversion, strengthening of the tongue base and pharyngeal muscles to decrease residue and promote clearing of liquids and solids and increase strength and coordination of the swallow to promote a timely swallow.  This will aid in reducing risk of aspiration that may lead to aspiration pneumonia.       Patient missed last week dysphagia therapy treatment secondary to hospitalization.  She is highly emotional today.  Below is the newest MBSS results.         Education:  POC, MBSS report and images, signs and symptoms of aspiration, proper diet, thickening of liquids, Myers Free Water Protocol, oral hygiene, silent aspiration, what will happen if patient continues to aspirate.       OBJECTIVE   GOALS         MODIFIED BARIUM SWALLOW STUDY: SPEECH PATHOLOGY REPORT           DATE OF SERVICE: 2024     PERTINENT INFORMATION:  Ms. Llanes is a 58year old female with diagnosis of dysphagia, status post throat cancer with radiation.     She was referred for an MBSS by Dr. Buchanan to rule out aspiration as well as to determine appropriate treatment plan for this patient.        PROCEDURE:     Ms. Llanes was  alert and cooperative.  The patient was viewed in the lateral plane.  The following Ba consistencies were administered: Thin liquids, nectar thick liquids, honey thick liquid, barium mixed with applesauce, barium paste, barium mixed with cracker. The following compensatory swallowing strategies were performed: Bolus modification, cyclic ingestion, chin tuck..        RESULTS:     1. Nectar liquid by spoon with spillage to the vallecula, swallow completed with laryngeal penetration.  Residual coating remaining in the laryngeal vestibule.  Patient cued for cough which does not clear.  2. Nectar liquid by cup with chin tuck, spillage to the vallecula, swallow completed with laryngeal penetration.   3. Thin liquid with loss of partial bolus to the pharynx and into the laryngeal vestibule, swallow completed with aspiration occurring with no cough.  4. Purée with spill over base of tongue, swallow completed.  5. Pudding with a lower base of tongue, swallow completed.  6. Solid, cracker dipped in applesauce, results with chewing followed by swallow completed.  7.  Honey thick liquid by cup with spillage to the vallecula, swallow completed.  8.  Nectar liquid by straw with chin tuck, spillage to the vallecula, swallow completed with deep laryngeal penetration.  Additional trial of nectar liquid by cup with chin tuck, spillage to the vallecula, swallow completed with deep laryngeal penetration to the vocal cords with no cough.  Residual coating remaining in the laryngeal vestibule.        IMPRESSIONS:     Ms. Llanes demonstrated oropharyngeal dysphagia characterized by swallow delay, decreased laryngeal elevation, decreased laryngeal closure.  Deep laryngeal penetration with nectar liquid, aspiration with thin with no cough.         RECOMMENDATIONS:   1.  Diet with mechanical soft solids, fork mashable, honey thickened liquid.  2.  Positioning fully upright for all p.o. intake and 30 minutes following.  3.  Alternate small  bites and small sips of solids and liquids.  Chin tuck each bite/sip.  May benefit from supraglottic technique.  4.  Continue with speech pathology services to address dysphagia with education of strategies.           Yes, patient/responsible party agrees with the plan of care and has been informed of all alternatives, risks and benefits.     Thank you for this referral.        GOALS ACTIVITY PERFORMED TRIALS COMPLETED LEVEL OF CUEING REQUIRED   LTG 1: 12 weeks:  The patient will demonstrate 20-39% swallow limitation by achieving a score of  5/7 on the Functional Communication Measures for swallowing to increase safety of swallow to highest levels of functioning using compensatory strategies to reduce risk of aspiration.             STG 1a: 12 weeks Patient will accept 80% trials of thin liquids on  2 consecutive trials with min to no clinical signs and symptoms of aspiration to facilitate a safe swallow         STG 1b:  12 weeks: Patient will accept 80% trials of easy to masticate solids on two consecutive trials of different bolus' with   minimal clinical signs and symptoms of residual or aspiration after the swallow  to facilitate a safe swallow.      STG 1c: 12 weeks: Patient will complete a snack and/or meal  using compensatory strategies with min assist and minimal clinical signs and symptoms of aspiration  to  facilitate a safe swallow.         STG 1d: 12 weeks:  Patient will be educated on signs and symptoms of aspiration and diet with patient verbalizing understanding with moderate cueing.   education  POC, MBSS report and images, signs and symptoms of aspiration, proper diet, thickening of liquids, Myers Free Water Protocol, oral hygiene, silent aspiration, what will happen if patient continues to aspirate.    Supraglottic swallow compensatory strategy    Max assist with continued education needed   STG 2a: 12 weeks:  Patient will complete pharyngeal isometrics with min to mod assist to strengthen  pharyngeal wall decreasing risk of residual after the swallow.          STG 2b: 12 weeks:  Patient will complete tongue base isometrics to increase tongue base strength to a 4/5 reducing  residual after the swallow, and increasing timeliness of swallow.       STG2c: 12 weeks:Patient will complete coordination exercises with min to mod assist to  reduce premature spillage/timely eppiglottic closure prior, during and/or after  the swallow decreasing the risk of aspiration Supraglottic swallow  10 repetitions Max assist to teach the supraglottic technique to use as a compensatory strategy to aid in preventing aspiration while eating.    STG 2d: 12 weeks:  Patient will be given a home exercise program and demonstrate understanding of the program with min to mod assist. HEP 3 sets of 10 repetitions; 3-5 times per day:     Yawn  Effortful swallow  Chin tuck against resistance     STG 2e: 12 weeks:  Patient will complete laryngeal elevation exercises with min assist to increase laryngeal elevation, eppiglottic inversion and coordination reducing risk of aspiration.      STG 2f: 12 Patient will complete Respiratory Muscle Training to include Expiratory Muscle Strength Training for 8 weeks to increase laryngeal elevation and coordination.                              ASSESSMENT/PLAN      ASSESSMENT: Patient requires the skills of a therapist to provide maximum assist to educate patient on MBSS and its significance to her diet and use of compensatory strategy of supraglottic swallow to clear residual in larynx after the swallow.  This will decrease risk of aspiration.      Progress:   PLAN: continue plan of care     Progress Note Due Date:8/18/24  Recertification Due Date:10/18/24                SIGNATURE: SAM [place], [place] License #: [id number]  Electronically Signed on 8/29/2024            Adult Outpatient Rehabiliation

## 2024-08-30 ENCOUNTER — PATIENT OUTREACH (OUTPATIENT)
Dept: CASE MANAGEMENT | Facility: OTHER | Age: 59
End: 2024-08-30
Payer: COMMERCIAL

## 2024-08-30 DIAGNOSIS — G89.4 CHRONIC PAIN SYNDROME: ICD-10-CM

## 2024-08-30 DIAGNOSIS — G89.3 CANCER ASSOCIATED PAIN: ICD-10-CM

## 2024-08-30 DIAGNOSIS — E11.9 TYPE 2 DIABETES MELLITUS WITHOUT COMPLICATION, UNSPECIFIED WHETHER LONG TERM INSULIN USE: Primary | ICD-10-CM

## 2024-08-30 NOTE — OUTREACH NOTE
CCM End of Month Documentation    This Chronic Medical Management Care Plan for Isha Llanes, 58 y.o. female, has been established; reviewed and a new plan of care implemented for the month of August.  A cumulative time of 45  minutes was spent on this patient record this month, including chart review; face to face visit with provider and patient.    Regarding the patient's problems: has Septic shock; Acute MI; Cancer associated pain; Presence of intrathecal pump; Chronic low back pain with bilateral sciatica; Sacroiliac inflammation; Chronic pain syndrome; Pelvic pain; Aortic stenosis, severe; Dyspnea on effort; Other pulmonary embolism without acute cor pulmonale; Nonrheumatic aortic valve stenosis; Hip pain; Anxiety disorder; Allergic rhinitis due to allergen; Arthritis; Asthma; Kidney disease; CHF (congestive heart failure); Chronic obstructive pulmonary disease; Diabetes mellitus, type 2; Depression; GERD (gastroesophageal reflux disease); S/P TAVR (transcatheter aortic valve replacement); Limited mobility; Venous hypertension of both lower extremities; Nicotine dependence; Mitral valve prolapse; Lupus (systemic lupus erythematosus); Long term current use of anticoagulant therapy; History of Hodgkin's lymphoma; Hepatic steatosis; Heart murmur; CAD (coronary artery disease); Non-healing wound of lower extremity, subsequent encounter; MSSA (methicillin susceptible Staphylococcus aureus) infection; Sepsis; Change in bowel habits; Nausea; Venous stasis ulcer of right calf; Venous stasis ulcer of left calf; Noncompliance; Peripheral vascular disease; Obesity with body mass index 30 or greater; Neurologic disorder; Neck pain; Limb pain; Hiatal hernia; Bladder disorder; Frailty; Cellulitis of right foot; Bowel disease; Atrophy of skin; Breast lump; Encounter for care related to vascular access port; Primary osteoarthritis of right hip; Tobacco abuse; Laryngeal cancer; Throat pain; Voice hoarseness; Hodgkin  lymphoma; Malignant neoplasm of supraglottis; Dysphagia; Nausea vomiting and diarrhea; Weight loss, abnormal; Epigastric pain; Nausea and vomiting; History of laryngeal cancer; Anorexia; AMS (altered mental status); Acute-on-chronic respiratory failure; and UTI (urinary tract infection) on their problem list., the following items were addressed: medical records; medications; changes to medical care; referrals to community service providers and any changes can be found within the plan section of the note.  A detailed listing of time spent for chronic care management is tracked within each outreach encounter.  Current medications include:  has a current medication list which includes the following prescription(s): acetaminophen, albuterol, albuterol sulfate hfa, clopidogrel, furosemide, metoprolol tartrate, mupirocin, nicotine, o2, ondansetron odt, pain, and prednisone. and the patient is reported to be patient is noncompliant with medication protocol,  Medications are reported to be non-effective in controlling symptoms and changes have been made to the medication protocol.  Regarding these diagnoses, referrals were made to the following provider(s):  NA.  All notes on chart for PCP to review.    The patient was monitored remotely for pain; medications; mood & behavior.    The patient's physical needs include:  resources for disability needs; needs assistance with ADLs.     The patient's mental support needs include:  continued support    The patient's cognitive support needs include:  increased support    The patient's psychosocial support needs include:  need for increased support; medication management or adherence    The patient's functional needs include: medication education; needs assistance for ADLs; physical healthcare    The patient's environmental needs include:  no access to transportation    Care Plan overall comments:  No data recorded    Refer to previous outreach notes for more information on the  areas listed above.    Monthly Billing Diagnoses  (E11.9) Type 2 diabetes mellitus without complication, unspecified whether long term insulin use    (G89.4) Chronic pain syndrome    (G89.3) Cancer associated pain    Medications   Medications have been reconciled    Care Plan progress this month:      Recently Modified Care Plans Updates made since 7/30/2024 12:00 AM       Osteoarthritis (Adult)           Problem Priority Last Modified     Pain (Osteoarthritis) --  8/7/2024 11:22 AM by Alejandrina Edmonds RN              Goal Recent Progress Last Modified     Manage Pain --  8/7/2024 11:22 AM by Alejandrina Edmonds, RN     Evidence-based guidance:   Develop a mutual, multimodal pain management plan with patient and caregiver; determine person's knowledge of the condition, any concerns or fears, personal preferences and ability to access services.   Assess pain level, treatment efficacy and patient response at regular intervals using a consistent pain scale; review the effectiveness and tolerability of all treatments.   Correlate pain with impact on daily activities, occupation, mood, sleep quality, comorbidities, other (nonarthritic) pain, fall risk and quality of life.   Encourage nonpharmacologic measures, such as applied heat or cold, exercise, physical or occupational therapy, orthoses, cognitive behavioral therapy, yoga, gregorio-chi, acupuncture, adequate sleep and weight loss.   Provide anticipatory guidance regarding benefits to multimodal treatment that improves quality of life.   Consider the use of transcutaneous TENS (electrical nerve stimulation), shock-absorbing footwear, orthoses, mineral baths, nutraceuticals or electromagnetic-field therapy.   Prepare patient for individualized pharmacologic pain management in a stepped approach based on presentation, risk and comorbidities; monitor and manage side effects and anticipate periodic adjustments.   Promote individualized plan to stay active when unable to  participate in physical therapy, such as passive and active range of motion, yoga, walking, water aerobics or swimming.   Support and optimize psychosocial response to pain.    Notes:            Task Due Date Last Modified     Facilitate Multimodal Pain Management Plan --  8/7/2024 11:22 AM by Alejandrina Edmonds RN     Care Management Activities:      - not discussed during this outreach      Notes:              Problem Priority Last Modified     Mobility and Function (Osteoarthritis) --  8/7/2024 11:22 AM by Alejandrina Edmonds RN              Goal Recent Progress Last Modified     Maintain Mobility and Function --  8/7/2024 11:22 AM by Alejandrina Edmonds RN     Evidence-based guidance:   Acknowledge and validate impact of pain, loss of strength and potential disfigurement (hand osteoarthritis) on mental health and daily life, such as social isolation, anxiety, depression, impaired sexual relationship and    injury from falls.   Anticipate referral to physical or occupational therapy for assessment, therapeutic exercise and recommendation for adaptive equipment or assistive devices; encourage participation.   Assess impact on ability to perform activities of daily living, as well as engage in sports and leisure events or requirements of work or school.   Provide anticipatory guidance and reassurance about the benefit of exercise to maintain function; acknowledge and normalize fear that exercise may worsen symptoms.   Encourage regular exercise, at least 10 minutes at a time for 45 minutes per week; consider yoga, water exercise and proprioceptive exercises; encourage use of wearable activity tracker to increase motivation and adherence.   Encourage maintenance or resumption of daily activities, including employment, as pain allows and with minimal exposure to trauma.   Assist patient to advocate for adaptations to the work environment.   Consider level of pain and function, gender, age, lifestyle, patient  "preference, quality of life, readiness and  €œcapacity to benefit\" when recommending patients for orthopaedic surgery consultation.   Explore strategies, such as changes to medication regimen or activity that enables patient to anticipate and manage flare-ups that increase deconditioning and disability.   Explore patient preferences; encourage exposure to a broader range of activities that have been avoided for fear of experiencing pain.   Identify barriers to participation in therapy or exercise, such as pain with activity, anticipated or imagined pain.   Monitor postoperative joint replacement or any preexisting joint replacement for ongoing pain and loss of function; provide social support and encouragement throughout recovery.    Notes:            Task Due Date Last Modified     Maintain or Improve Strength and Functional Ability --  8/7/2024 11:22 AM by Alejandrina Edmonds RN     Care Management Activities:      - not discussed during this outreach      Notes:              Problem Priority Last Modified     Harm or Injury (Osteoarthritis) --  8/7/2024 11:22 AM by Alejandrina Edmonds RN              Goal Recent Progress Last Modified     Harm or Injury Prevented --  8/7/2024 11:22 AM by Alejandrina Edmonds RN     Evidence-based guidance:   Assess fall risk related to postural control, balance and gait impairment, muscle weakness, diminished vision and hearing, presence of incontinence, environmental hazards and effects of medication.   Address potential barriers to activity or exercise, such as low self-efficacy and fear of falling.   Address fall risk by considering single vison versus multifocal lenses (glasses) during activity, environmental modification and antislip, flat-heeled shoes, use of orthoses and assistive devices.   Promote regular exercise focused on improving strength, based on ability and tolerance; consider activities, such as yoga or gregorio chi, that promote balance.   Review current medication; " consider vitamin D supplementation and de-prescription of psychotropic medication.   Monitor and address analgesic use, such as acetaminophen, nonsteroidal anti-inflammatory agent or opioid, for complications that may include hepatotoxicity, dependence, constipation, dyspepsia, gastrointestinal bleeding, heart or    renal disease.   Monitor depression, anxiety and suicide risk.   Collaborate with patient and parent/caregiver to develop a safety plan or coping action plan to reduce suicide risk.   Include recognition of warning signs of an impending suicidal crisis, restricting access to lethal means, use of internal coping strategies and contact methods for mental health professional or agency.    Notes:            Task Due Date Last Modified     Identify and Reduce Risk to Safety --  8/7/2024 11:22 AM by Alejandrina Edmonds RN     Care Management Activities:      - not discussed during this outreach      Notes:                   Chronic Pain (Adult)           Problem Priority Last Modified     Pain Management Plan (Chronic Pain) --  8/7/2024 11:22 AM by Alejandrina Edmonds RN              Goal Recent Progress Last Modified     Pain Management Plan Developed --  8/7/2024 11:22 AM by Alejandrina Edmonds RN     Evidence-based guidance:   Acknowledge patient as the expert in pain self-management.   Partner to set a comfort goal that allows for return to work, school, usual activity and acceptable quality of life.   Assess pain level, usual behavior with and without pain and symptoms that commonly occur with chronic pain, such as fatigue, sleep problems, cognitive and mood disturbance; use a consistent pain scale.   Determine if pain is associated with mobility or at rest, location, intensity, frequency, duration, recurrence, pattern, triggers, relieving factors and description, such as cramping, burning or aching.    Mutually develop an interdisciplinary, multi-modal and detailed pain management plan with patient and  "family or caregiver; track the patient's response to pain management and adjust plan as needed.    Notes:            Task Due Date Last Modified     Partner to Develop Chronic Pain Management Plan --  8/7/2024 11:22 AM by Alejandrina Edmonds RN     Care Management Activities:      - not discussed during this outreach      Notes:              Problem Priority Last Modified     Chronic Pain Management (Chronic Pain) --  8/7/2024 11:22 AM by Alejandrina Edmonds RN              Goal Recent Progress Last Modified     Chronic Pain Managed --  8/7/2024 11:22 AM by Alejandrian Edmonds RN     Evidence-based guidance:   Address common beliefs about pain, such as pain is to be endured, a normal part of aging or that it is not \"real\"; feelings of resignation that nothing can be done and that complaining will be a sign of weakness.   Assess pain level, treatment efficacy and patient response at regular intervals using a consistent pain scale.   Assess pain using self-report (most reliable), family/caregiver report, validated pain scale; consider impact on quality of life.   Determine if pain is associated with mobility or at rest, location, intensity, frequency, duration, recurrence, pattern and description (e.g., cramping, burning, aching), triggers and relieving factors.    Anticipate referral to pain education program, pain management support or community resources for specific diagnoses (e.g., cancer, fibromyalgia, multiple sclerosis).   Explore fears associated with anticipated or imagined pain; encourage acceptance-based approaches.   Encourage exposure to experiences previously avoided due to fear of pain.   Anticipate referral to pain management specialist, physical therapist, addiction specialist (if history of substance use), psychotherapist; advocate for consultation with pharmacist.   Initiate nonpharmacologic measures, such as cognitive behavior therapy, mindfulness, guided imagery, massage, distraction, relaxation, " chiropractic manipulation, dietary supplements or acupuncture.   Provide multimodal treatment interventions, such as physical activity, therapeutic exercise, yoga, TENS (transcutaneous electrical nerve stimulation) and manual therapy.   Train in functional activity modifications, such as body mechanics, posture, ergonomics, energy conservation and activity pacing.   Encourage use of local anesthetic or analgesic therapy as an adjunct for pain control (e.g., lidocaine patch, capsaicin cream, topical nonsteroidal anti-inflammatory drugs).   Prepare patient for use of pharmacologic therapy in a stepped approach that may include acetaminophen, nonsteroidal anti-inflammatory drugs, opioid, antiepileptic, antidepressant or nonbenzodiazepine muscle relaxant.   Review efficacy, tolerability, adherence and manage medication-induced side effects.    Notes:            Task Due Date Last Modified     Alleviate Barriers to Chronic Pain Management --  8/7/2024 11:22 AM by Alejandrina Edmonds RN     Care Management Activities:      - not discussed during this outreach      Notes:              Problem Priority Last Modified     Harm or Injury (Chronic Pain) --  8/7/2024 11:22 AM by Alejandrina Edmonds RN              Goal Recent Progress Last Modified     Harm or Injury Prevented --  8/7/2024 11:22 AM by Alejandrina Edmonds RN     Evidence-based guidance:   Address risk for personal injury, such as falls, motor vehicle accidents, self-harm due to medication side effects, compromised mobility or diminished perception, reasoning, decision-making and judgment.   Engage family in providing a safe home environment and closely monitoring changes in the patient's level of sedation and emotional state, such as negativity, hopelessness and suicidal ideation.   Explore suicidal tendencies compassionately, yet directly, by asking about suicidal ideation, attempt history and family history.   Maintain frequent, structured and supportive  contact, such as by phone, office or home visit; collaborate closely with behavioral health specialists or psychiatry.   Make immediate arrangements for evaluation at emergency department, community mental health agency or other psychiatric service when patient expresses positive suicidal ideation with a plan and access to lethal means.   Promote fall risk prevention by making home and environmental adjustments; provide clear instructions regarding ability to drive.   Assess for opioid-induced constipation; provide anticipatory guidance regarding prevention when beginning opioid use by using stool softener or laxative, as well as increasing fluids and dietary fiber.   When opioid-induced constipation is noted, optimize lifestyle interventions and anticipate the use of opioid antagonist as well as tapering, discontinuation or change of opioid.   Encourage frequent oral hygiene (brushing and rinsing), the use of xylitol-containing gum as well as sugar-free and decaffeinated beverages when dry mouth is reported.   Assess for signs and symptoms of opioid endocrinopathy, such as sexual dysfunction, decreased libido, osteoporosis, osteopenia or infertility.   When endocrinopathy is present, anticipate changing opioid medication, tapering or discontinuation of opioid or initiation of hormone supplementation.   Assess for signs/symptoms of sleep-disordered breathing.   Anticipate referral for sleep study and potential tapering or discontinuation of opioid and initiation of noninvasive positive pressure breathing or adaptive servo ventilation device.   Evaluate risk of opioid misuse prior to or early in treatment with opioids.   Provide anticipatory guidance regarding use and misuse of opioid medication, including not crushing pills, dissolving in juice or mixing with applesauce; consider change to crush-resistant opioid.   Complete periodic screening for opioid misuse and/or signs of substance tolerance (increased dose to  reach desired effect, decreased effect with same dose).   Monitor drug-taking behaviors, such as hoarding medication, independently increasing dose, using for other than analgesia and seeing multiple physicians for prescriptions; review state prescription drug monitoring database.   Consider written agreement if patient has used opioids for more than 30 days or episodically over 1 year; required use of nonpharmacologic therapy, urine testing, pill counting, banning sharing or selling of opioids.   When tapering or stopping opioids, frame the discussion in terms of safety and efficacy; reaffirm commitment to patient's health and new treatment plan; respond to fear with empathy; maintain consistent message and approach.    Notes:            Task Due Date Last Modified     Identify and Reduce Risks for Harm or Injury --  8/7/2024 11:22 AM by Alejandrina Edmonds RN     Care Management Activities:      - not discussed during this outreach      Notes:                   Fall Risk (Adult)           Problem Priority Last Modified     Fall Risk --  8/7/2024 11:22 AM by Alejandrina Edmonds RN              Goal Recent Progress Last Modified     Absence of Fall and Fall-Related Injury --  8/7/2024 11:22 AM by Alejandrina Edmonds RN     Evidence-based guidance:   Assess fall risk using a validated tool when available. Consider balance and gait impairment, muscle weakness, diminished vision or hearing, environmental hazards, presence of urinary or bowel urgency and/or incontinence.   Communicate fall injury risk to interprofessional healthcare team.   Develop a fall prevention plan with the patient and family.   Promote use of personal vision and auditory aids.   Promote reorientation, appropriate sensory stimulation, and routines to decrease risk of fall when changes in mental status are present.   Assess assistance level required for safe and effective self-care; consider referral for home care.   Encourage physical activity,  such as performance of self-care at highest level of ability, strength and balance exercise program, and provision of appropriate assistive devices; refer to rehabilitation therapy.   Refer to community-based fall prevention program where available.   If fall occurs, determine the cause and revise fall injury prevention plan.   Regularly review medication contribution to fall risk; consider risk related to polypharmacy and age.   Refer to pharmacist for consultation when concerns about medications are revealed.   Balance adequate pain management with potential for oversedation.   Provide guidance related to environmental modifications.   Consider supplementation with Vitamin D.    Notes:            Task Due Date Last Modified     Identify and Manage Contributors to Fall Risk --  8/7/2024 11:22 AM by Alejandrina Edmonds RN     Care Management Activities:      - not discussed during this outreach      Notes:                   Nonspecific Low Back Pain (Adult)           Problem Priority Last Modified     Acute Low Back Pain --  8/7/2024 11:22 AM by Alejandrina Edmonds RN              Goal Recent Progress Last Modified     Acute Low Back Pain Managed --  8/7/2024 11:39 AM by Alejandrina Edmonds RN     Evidence-based guidance:   Set pain management goals; mutually determine pain management plan and review plan regularly.   Consider the presence and impact of preexisting chronic pain.    Use a consistent, validated tool for pain assessment including function and quality of life.   Provide anticipatory guidance that bedrest is rarely prescribed and only with severe symptoms including leg pain; bedrest any longer than 4 days leads to debilitation and is not recommended.   Encourage temporarily avoiding activities that may cause spinal stress including unsupported sitting, heavy lifting, bending or twisting the back and a gradual return to usual activities.   Prepare patient for use of pharmacologic therapy in a stepped,  single agent or combination approach that may include acetaminophen, nonsteroidal anti-inflammatory drug, antidepressant, nonbenzodiazepine muscle relaxant.   Evaluate risk for use of opioid medication, which may be prescribed for a limited amount of time.   Evaluate pain level, efficacy, tolerability and adherence to pain management plan.   Manage medication-induced effects, such as constipation, nausea, urinary retention, somnolence and dizziness.   Encourage use of multimodal interventions, such as physical activity, therapeutic exercise, massage, chiropractic manipulation, manual therapy, herbal supplement, application of heat or cold.   Provide frequent follow-up and monitoring of symptoms if recovery is not achieved within 3 months.    Notes:              Task Due Date Last Modified     Partner to Develop Acute Low Back Pain Plan --  8/7/2024 11:22 AM by Alejandrina Edmonds RN     Care Management Activities:      - not discussed during this outreach      Notes:              Problem Priority Last Modified     Adjustment to Low Back Pain --  8/7/2024 11:22 AM by Alejandrina Edmonds RN              Goal Recent Progress Last Modified     Optimal Coping --  8/7/2024 11:39 AM by Alejandrina Edmonds RN     Evidence-based guidance:   Consider and address emotional response to pain; provide brief, positive messages about recovery; acknowledge strain on family or caregiver.   Modify pain perception using techniques, such as distraction, mindfulness, guided imagery, progressive relaxation, meditation, or music.   Provide or refer for CBT (cognitive behavioral therapy) or ACT (acceptance commitment therapy) which focuses on acceptance of life events.   Explore fears associated with anticipated or imagined pain; encourage acceptance-based approaches that pain can be managed and an active, working life maintained.   Encourage exposure to experiences previously avoided due to fear of pain.    Assess for history of or current  substance misuse; use standardized risk screen when available.   Use a validated screening tool to identify level of suicide risk.   If at risk, develop safety plan and implement additional safety measures based on level of risk such as behavioral health referral or immediate assessment.    Notes:              Task Due Date Last Modified     Support Psychosocial Adjustment to Low Back Pain --  8/7/2024 11:22 AM by Alejandrina Edmonds RN     Care Management Activities:      - not discussed during this outreach      Notes:              Problem Priority Last Modified     Chronic Low Back Pain --  8/7/2024 11:22 AM by Alejandrina Edmonds RN              Goal Recent Progress Last Modified     Chronic Low Back Pain Managed --  8/7/2024 11:39 AM by Alejandrina Edmonds RN     Evidence-based guidance:   Assess pain at regular intervals using self-report (most reliable), family/caregiver report, validated pain scale; consider impact on quality of life.   Determine if pain is associated with mobility or at rest, location, intensity, frequency, duration, recurrence, pattern and description (e.g., cramping, burning, aching), triggers and relieving factors.   Mutually develop an interdisciplinary and multimodal pain management plan with patient and caregiver; track the patient's response to the pain management plan.   Partner with patient to set a comfort goal that allows for return to work, usual activity and acceptable quality of life.   Prepare patient for use of pharmacologic therapy in a stepped, single agent or combination approach that may include acetaminophen, nonsteroidal anti-inflammatory drug, antidepressant, nonbenzodiazepine muscle relaxant, lidocaine patch.   Evaluate risk for use of opioid medication, which may be prescribed for a limited about of time.   Encourage use of multimodal interventions, such as physical therapy, cognitive behavior therapy, massage, distraction, yoga, relaxation, chiropractic  manipulation, herbal supplement, acupuncture, application of heat or cold.   Encourage use of local anesthetic or analgesic therapy as an adjunct for pain control, including capsaicin cream, topical nonsteroidal anti-inflammatory drug.   Evaluate pain level, efficacy, tolerability and adherence to pain management plan.   Manage medication-induced effects, such as constipation, nausea, urinary retention, somnolence and dizziness.   Prepare patient for referral to pain education program, pain management support or community resources.   Prepare patient for referral to pain management specialist, neurologist, physical therapist, addiction specialist (if history of substance use), psychotherapist; advocate for consultation with pharmacist.   Provide follow-up and monitoring to acknowledge chronicity and manageability of pain.    Notes:              Task Due Date Last Modified     Partner to Develop Chronic Low Back Pain Plan --  8/7/2024 11:22 AM by Alejandrina Edmonds RN     Care Management Activities:      - not discussed during this outreach      Notes:              Problem Priority Last Modified     Functional Decline --  8/7/2024 11:22 AM by Alejandrina Edmonds RN              Goal Recent Progress Last Modified     Maintain Mobility and Function --  8/7/2024 11:39 AM by Alejandrina Edmonds, RN     Evidence-based guidance:   Emphasize the importance of physical activity and aerobic exercise as included in treatment plan; assess barriers to adherence; consider patient's abilities and preferences.   Encourage gradual increase in activity or exercise instead of stopping if pain occurs.   Reinforce individual therapy exercise prescription, such as strengthening, stabilization and stretching programs.   Promote optimal body mechanics to stabilize the spine with lifting and functional activity.   Encourage activity and mobility modifications to facilitate optimal function, such as using a log roll for bed mobility or  dressing from a seated position.   Reinforce individual adaptive equipment recommendations to limit excessive spinal movements, such as a long-handled reacher.   Assess adequacy of sleep; encourage use of sleep hygiene techniques, such as bedtime routine; use of white noise; dark, cool bedroom; avoiding daytime naps, heavy meals or exercise before bedtime.   Promote positions and modification to optimize sleep and sexual activity; consider pillows or positioning devices to assist in maintaining neutral spine.   Explore options for applying ergonomic principles at work and home, such as frequent position changes, using ergonomically designed equipment and working at optimal height.   Promote modifications to increase comfort with driving such as lumbar support, optimizing seat and steering wheel position, using cruise control and taking frequent rest stops to stretch and walk.    Notes:              Task Due Date Last Modified     Maintain or Improve Strength and Functional Ability --  8/7/2024 11:22 AM by Alejandrina Edmonds RN     Care Management Activities:      - not discussed during this outreach      Notes:                        Current Specialty Plan of Care Status signed by both patient and provider    Instructions   Patient was provided an electronic copy of care plan  CCM services were explained and offered and patient has accepted these services.  Patient has given their written consent to receive CCM services and understands that this includes the authorization of electronic communication of medical information with the other treating providers.  Patient understands that they may stop CCM services at any time and these changes will be effective at the end of the calendar month and will effectively revocate the agreement of CCM services.  Patient understands that only one practitioner can furnish and be paid for CCM services during one calendar month.  Patient also understands that there may be co-payment  or deductible fees in association with CCM services.  Patient will continue with at least monthly follow-up calls with the Ambulatory .    Alejandrina BARBA  Ambulatory Case Management    8/30/2024, 12:23 EDT

## 2024-09-03 ENCOUNTER — TELEPHONE (OUTPATIENT)
Dept: RADIATION ONCOLOGY | Facility: HOSPITAL | Age: 59
End: 2024-09-03
Payer: COMMERCIAL

## 2024-09-03 NOTE — TELEPHONE ENCOUNTER
Diagnosis: History of head and neck cancer      Reason for Referral: Transportation     Content of Visit: OSW assistance requested by Selina NEGRON MA in radiation oncology, to help arrange Ms. Llanes's transportation to her upcoming PET scan on 9/10/24 at 1000 (confirmation #[id number], return #[id number]) and a follow-up visit with the radiation oncologist on 9/13/24 at 0930 (confirmation #[id number], return #[id number]). OSW contacted [employer] to arrange her transportation 15 minutes prior to the scheduled appointment time. Confirmation numbers noted above. OSW support remains available.      Resources/Referrals Provided: [employer]

## 2024-09-04 ENCOUNTER — READMISSION MANAGEMENT (OUTPATIENT)
Dept: CALL CENTER | Facility: HOSPITAL | Age: 59
End: 2024-09-04
Payer: COMMERCIAL

## 2024-09-04 NOTE — OUTREACH NOTE
"Medical Week 2 Survey      Flowsheet Row Responses   [facility] patient discharged from? Go   Does the patient have one of the following disease processes/diagnoses(primary or secondary)? Other   Week 2 attempt successful? Yes   Call start time 1448   Discharge diagnosis AMS (altered mental status)   Call end time 1457   Person spoke with today (if not patient) and relationship dtr   Meds reviewed with patient/caregiver? Yes   Does the patient have all medications ordered at discharge? Yes   Is the patient taking all medications as directed (includes completed medication regime)? No   What is preventing the patient from taking all medications as directed? Side effects  [dtr states \"it is tearing her stomach up\"]   Nursing Interventions Advised patient to call provider   Does the patient have a primary care provider?  Yes   Does the patient have an appointment with their PCP within 7 days of discharge? No   What is preventing the patient from scheduling follow up appointments within 7 days of discharge? Transportation   Has the patient kept scheduled appointments due by today? No  [transportation issues]   Psychosocial issues? No   Did the patient receive a copy of their discharge instructions? Yes   Nursing interventions Reviewed instructions with patient   What is the patient's perception of their health status since discharge? Improving   If the patient is a current smoker, are they able to teach back resources for cessation? Not a smoker   Week 2 Call Completed? Yes   Graduated Yes   Is the patient interested in additional calls from an ambulatory ? Yes   What is the reason the patient needs support from an ACM? High Risk For ED/Readmission, Medication Adherence  [Dtr states pt does not have reliable transportation and was late to PCP appt, was told to reschedule.  She has not been taking Flagyl due to GI distress it causes.  She needs a diff antibiotic and hasnt been able to see PCP since " discharge.]   Wrap up additional comments Pt unable to take Flagyl.  She didnt arrive on time to PCP appt due to transportation issues.  High readmission risk/barriers in care.  Routed to ambulatory case management at this time.   Call end time [id number]            KELLY MOLINA - Registered Nurse

## 2024-09-05 ENCOUNTER — HOSPITAL ENCOUNTER (OUTPATIENT)
Dept: RADIATION ONCOLOGY | Facility: HOSPITAL | Age: 59
Setting detail: RADIATION/ONCOLOGY SERIES
Discharge: HOME | End: 2024-09-05
Payer: COMMERCIAL

## 2024-09-05 DIAGNOSIS — R13.12 OROPHARYNGEAL DYSPHAGIA: Primary | ICD-10-CM

## 2024-09-05 PROCEDURE — 92526 ORAL FUNCTION THERAPY: CPT | Performed by: SPEECH-LANGUAGE PATHOLOGIST

## 2024-09-05 RX ORDER — ONDANSETRON 4 MG/1
4 TABLET, ORALLY DISINTEGRATING ORAL EVERY 8 HOURS PRN
Qty: 14 TABLET | Refills: 0 | Status: SHIPPED | OUTPATIENT
Start: 2024-09-05

## 2024-09-05 NOTE — TELEPHONE ENCOUNTER
Rx Refill Note  Requested Prescriptions     Pending Prescriptions Disp Refills    ondansetron ODT (ZOFRAN-ODT) 4 MG disintegrating tablet 14 tablet 0     Sig: Place 1 tablet on the tongue Every 8 (Eight) Hours As Needed for Nausea or Vomiting.      Last office visit with prescribing clinician: 8/21/2024   Last telemedicine visit with prescribing clinician: Visit date not found   Next office visit with prescribing clinician: 9/10/2024                         Would you like a call back once the refill request has been completed: [] Yes [] No    If the office needs to give you a call back, can they leave a voicemail: [] Yes [] No    Maximilian Sanchez Rep  09/05/24, 12:51 EDT        Patient is also asking for a REFILL on her XANAX. PLEASE CALL AND ADVISE.  MADE APPOINTMENT FOR NEXT THURSDAY 09/12/2024.

## 2024-09-05 NOTE — THERAPY PROGRESS REPORT/RE-CERT
[facility] Outpatient Adult Speech Language Therapy            Treatment Note and 30 Day Progress Note    Patient: Isha Llanes                                                                                     Visit Date: 2024  :     1965    Referring practitioner:    Dr. Goyo Nunez  Date of Initial Visit:          Type: THERAPY  Noted: 2024    Patient seen for Visit count could not be calculated. Make sure you are using a visit which is associated with an episode. sessions    Visit Diagnoses:    ICD-10-CM ICD-9-CM   1. Oropharyngeal dysphagia  R13.12 787.22     SUBJECTIVE   Patient presents to speech language pathology outpatient today secondary to dysphagia.  She is receiving swallow treatment to increase safety of swallow by increasing laryngeal elevation to promote epiglottic inversion, strengthening of the tongue base and pharyngeal muscles to decrease residue and promote clearing of liquids and solids and increase strength and coordination of the swallow to promote a timely swallow.  This will aid in reducing risk of aspiration that may lead to aspiration pneumonia.           OBJECTIVE   GOALS         GOALS ACTIVITY PERFORMED TRIALS COMPLETED LEVEL OF CUEING REQUIRED   LTG 1: 12 weeks:  The patient will demonstrate 20-39% swallow limitation by achieving a score of  5/7 on the Functional Communication Measures for swallowing to increase safety of swallow to highest levels of functioning using compensatory strategies to reduce risk of aspiration.             STG 1a: 12 weeks Patient will accept 80% trials of thin liquids on  2 consecutive trials with min to no clinical signs and symptoms of aspiration to facilitate a safe swallow  nectar thickened orange juice  10/10 using a supraglottic swallow    Patient is complaining of VPI on several drinks  Max assist   STG 1b:  12 weeks: Patient will accept 80% trials of easy to  masticate solids on two consecutive trials of different bolus' with   minimal clinical signs and symptoms of residual or aspiration after the swallow  to facilitate a safe swallow.  pudding                  Soft solid  10 ml spoonfuls-8/8 with a effortful swallow- patient able to clear bolus with two swallows    Banana  12/12 effortful swallows  min to mod assist to complete an effortful swallow   STG 1c: 12 weeks: Patient will complete a snack and/or meal  using compensatory strategies with min assist and minimal clinical signs and symptoms of aspiration  to  facilitate a safe swallow.  1c will be addressed after metting 1b goal       STG 1d: 12 weeks:  Patient will be educated on signs and symptoms of aspiration and diet with patient verbalizing understanding with moderate cueing.   education  Diet, POC, supraglottic swallow and good oral care  Max assist with continued education needed   STG 2a: 12 weeks:  Patient will complete pharyngeal isometrics with min to mod assist to strengthen pharyngeal wall decreasing risk of residual after the swallow.   pharyngeal isometrics  2 sets of 10 repetitions  mod to max assist to model   STG 2b: 12 weeks:  Patient will complete tongue base isometrics to increase tongue base strength to a 4/5 reducing  residual after the swallow, and increasing timeliness of swallow.   tongue base isometrics  2 sets of 10 repetitions    Tongue base strength rated a 3/5 fair  mod to max assist to cue to elevate the tongue.  Patient fatiguing on second set   STG2c: 12 weeks:Patient will complete coordination exercises with min to mod assist to  reduce premature spillage/timely eppiglottic closure prior, during and/or after  the swallow decreasing the risk of aspiration Supraglottic swallow  10 repetitions using nectar thickened orange juice by 5 ml spoon Max assist to cue when drinking to cue to complete compensatory strategy.   STG 2d: 12 weeks:  Patient will be given a home exercise program  and demonstrate understanding of the program with min to mod assist. HEP 3 sets of 10 repetitions; 3-5 times per day:     Yawn  Effortful swallow  Chin tuck against resistance  max assist to cue patient that the HEP needs to be done at least 3 times per day.   STG 2e: 12 weeks:  Patient will complete laryngeal elevation exercises with min assist to increase laryngeal elevation, eppiglottic inversion and coordination reducing risk of aspiration.  laryngeal elevation   CTAR 2 sets of 10 repetitions  max assist to refrain from using large muscle (cervical muscles) when chin tucking   STG 2f: 12 Patient will complete Respiratory Muscle Training to include Expiratory Muscle Strength Training for 8 weeks to increase laryngeal elevation and coordination.       N/A    Discharge goal             ASSESSMENT/PLAN     ASSESSMENT: Patient requires the skills of a therapist to provide mod to max cueing to increase swallow strength and coordination to develop a safer swallow and reduce risk of aspiration that may lead to pneumonia.    PLAN: Continue plan of care    Progress Note Due Date:10/5/24  Recertification Due Date:10/18/24    SIGNATURE: SAM [place], [place] License #: [id number]  Electronically Signed on 9/5/2024            Adult Outpatient Rehabiliation

## 2024-09-06 ENCOUNTER — PATIENT OUTREACH (OUTPATIENT)
Dept: CASE MANAGEMENT | Facility: OTHER | Age: 59
End: 2024-09-06
Payer: COMMERCIAL

## 2024-09-06 DIAGNOSIS — E11.9 TYPE 2 DIABETES MELLITUS WITHOUT COMPLICATION, UNSPECIFIED WHETHER LONG TERM INSULIN USE: Primary | ICD-10-CM

## 2024-09-06 DIAGNOSIS — G89.4 CHRONIC PAIN SYNDROME: ICD-10-CM

## 2024-09-06 LAB
QT INTERVAL: 363 MS
QTC INTERVAL: 480 MS

## 2024-09-06 NOTE — OUTREACH NOTE
AMBULATORY CASE MANAGEMENT NOTE    Names and Relationships of Patient/Support Persons: Contact: Isha Llanes; Relationship: Self -       CCM Interim Update  Called patient to follow up. Patient voice extremely hoarse, unable to hear patient well. Patient not very responsive to questions. RN discussed calling back later when patient can speak better. Patient verbalized agreement. RN will call back next week.      Education Documentation  No documentation found.        Alejandrina BARBA  Ambulatory Case Management    9/6/2024, 10:39 EDT

## 2024-09-09 LAB
6MAM SERPLBLD-MCNC: NEGATIVE NG/ML
AMPHETAMINES SERPL QL SCN: NEGATIVE NG/ML
BARBITURATES SERPL QL SCN: NEGATIVE UG/ML
BENZODIAZ SERPL QL SCN: NEGATIVE NG/ML
CANNABINOIDS SERPL QL SCN: NEGATIVE NG/ML
COCAINE+BZE SERPL QL SCN: NEGATIVE NG/ML
CODEINE SERPLBLD CFM-MCNC: NEGATIVE NG/ML
DHC SERPLBLD CFM-MCNC: NEGATIVE NG/ML
HYDROCODONE SERPLBLD CFM-MCNC: NEGATIVE NG/ML
HYDROMORPHONE SERPLBLD CFM-MCNC: 1.1 NG/ML
METHADONE SERPL QL SCN: NEGATIVE NG/ML
MORPHINE SERPLBLD-MCNC: NEGATIVE NG/ML
OPIATES SERPL QL SCN: ABNORMAL NG/ML
OPIATES SPEC QL: POSITIVE
OXYCODONE SERPLBLD CFM-MCNC: NEGATIVE NG/ML
OXYCODONE+OXYMORPHONE SERPLBLD CFM-IMP: NEGATIVE
OXYCODONE+OXYMORPHONE SERPLBLD QL SCN: NEGATIVE NG/ML
OXYMORPHONE SERPLBLD CFM-MCNC: NEGATIVE NG/ML
PCP SERPL QL SCN: NEGATIVE NG/ML
PROPOXYPH SERPL QL SCN: NEGATIVE NG/ML

## 2024-09-10 ENCOUNTER — HOSPITAL ENCOUNTER (OUTPATIENT)
Dept: PET IMAGING | Facility: HOSPITAL | Age: 59
Discharge: HOME | End: 2024-09-10
Payer: COMMERCIAL

## 2024-09-10 ENCOUNTER — SPECIMEN COLLECTION (OUTPATIENT)
Dept: RADIATION ONCOLOGY | Facility: HOSPITAL | Age: 59
End: 2024-09-10
Payer: COMMERCIAL

## 2024-09-10 DIAGNOSIS — C32.1 MALIGNANT NEOPLASM OF SUPRAGLOTTIS: ICD-10-CM

## 2024-09-10 PROCEDURE — 0 FLUDEOXYGLUCOSE F18 SOLUTION: Performed by: RADIOLOGY

## 2024-09-10 PROCEDURE — A9552 F18 FDG: HCPCS | Performed by: RADIOLOGY

## 2024-09-10 PROCEDURE — 78815 PET IMAGE W/CT SKULL-THIGH: CPT

## 2024-09-10 RX ADMIN — FLUDEOXYGLUCOSE F 18 1 DOSE: 200 INJECTION, SOLUTION INTRAVENOUS at 10:17

## 2024-09-12 ENCOUNTER — TELEPHONE (OUTPATIENT)
Dept: CASE MANAGEMENT | Facility: OTHER | Age: 59
End: 2024-09-12
Payer: COMMERCIAL

## 2024-09-12 ENCOUNTER — HOSPITAL ENCOUNTER (OUTPATIENT)
Dept: RADIATION ONCOLOGY | Facility: HOSPITAL | Age: 59
Setting detail: RADIATION/ONCOLOGY SERIES
Discharge: HOME | End: 2024-09-12
Payer: COMMERCIAL

## 2024-09-12 DIAGNOSIS — R13.12 OROPHARYNGEAL DYSPHAGIA: Primary | ICD-10-CM

## 2024-09-12 PROCEDURE — 92526 ORAL FUNCTION THERAPY: CPT | Performed by: SPEECH-LANGUAGE PATHOLOGIST

## 2024-09-12 NOTE — THERAPY TREATMENT NOTE
Outpatient Adult Speech Language Therapy                                                                                           Visit Date: 2024 Treatment Note     Patient: Isha Llanes                                                                                      :     1965    Referring practitioner:    No Known Provider  Date of Initial Visit:          24     Patient seen for  sessions     Visit Diagnoses:  Visit Diagnosis       ICD-10-CM ICD-9-CM   1. Oropharyngeal dysphagia  R13.12 787.22         SUBJECTIVE   Patient presents to speech language pathology outpatient today secondary to dysphagia.  She is receiving swallow treatment to increase safety of swallow by increasing laryngeal elevation to promote epiglottic inversion, strengthening of the tongue base and pharyngeal muscles to decrease residue and promote clearing of liquids and solids and increase strength and coordination of the swallow to promote a timely swallow.  This will aid in reducing risk of aspiration that may lead to aspiration pneumonia.       Patient indicates she is eating oatmeal and toast.  Patient weighed in a 129.01 pounds.  Patient indicated she weighed 137 about two weeks ago. Patient came into session drinking a Pepsi in a cup with a straw.          Education:  Patient re-educated on risk of aspirating using a straw and thin liquids.  Educated on compensatory strategies.  OBJECTIVE   GOALS             GOALS ACTIVITY PERFORMED TRIALS COMPLETED LEVEL OF CUEING REQUIRED   LTG 1: 12 weeks:  The patient will demonstrate 20-39% swallow limitation by achieving a score of  5/7 on the Functional Communication Measures for swallowing to increase safety of swallow to highest levels of functioning using compensatory strategies to reduce risk of aspiration.             STG 1a: 12 weeks Patient will accept 80% trials of thin liquids on  2  consecutive trials with min to no clinical signs and symptoms of aspiration to facilitate a safe swallow  nectar thickened water  By 5 ml spoon    5 ml spoon of thin liquid of water  10/10          1/3  max assist to complete a chin tuck and supraglottic swallow when eating and drinking   STG 1b:  12 weeks: Patient will accept 80% trials of easy to masticate solids on two consecutive trials of different bolus' with   minimal clinical signs and symptoms of residual or aspiration after the swallow  to facilitate a safe swallow. Easy soft masticated solid Nutrigrain bar-4/5 with patient using a nectar thickened wash Mod to max assist to complete compensatory strategies of supraglottic swallow and chin tuck.   STG 1c: 12 weeks: Patient will complete a snack and/or meal  using compensatory strategies with min assist and minimal clinical signs and symptoms of aspiration  to  facilitate a safe swallow.         STG 1d: 12 weeks:  Patient will be educated on signs and symptoms of aspiration and diet with patient verbalizing understanding with moderate cueing.   education  POC, MBSS report and images, signs and symptoms of aspiration, proper diet, thickening of liquids, [place] Free Water Protocol, oral hygiene, silent aspiration, what will happen if patient continues to aspirate.    Supraglottic swallow compensatory strategy    Max assist with continued education needed   STG 2a: 12 weeks:  Patient will complete pharyngeal isometrics with min to mod assist to strengthen pharyngeal wall decreasing risk of residual after the swallow.   yawn  3 sets of 10 repetitions  mod assist      STG 2b: 12 weeks:  Patient will complete tongue base isometrics to increase tongue base strength to a 4/5 reducing  residual after the swallow, and increasing timeliness of swallow.  Tongue base retraction and hold for 5 seconds 1 set of 10 repetitions Mod assist to retract the tongue and hold   STG2c: 12 weeks:Patient will complete coordination  exercises with min to mod assist to  reduce premature spillage/timely eppiglottic closure prior, during and/or after  the swallow decreasing the risk of aspiration Supraglottic swallow with use of 10 ml spoon of applesauce 3 sets of 10 repetitions Mod to max assist.  Patient complaining of left pharyngeal residue due to an old injury plus radiation.  Recommended a left head turn and chin tuck to reduce residual in the left pharyngeal sinuses.   STG 2d: 12 weeks:  Patient will be given a home exercise program and demonstrate understanding of the program with min to mod assist. HEP 3 sets of 10 repetitions; 3-5 times per day:     Yawn  Effortful swallow  Chin tuck against resistance  min assist   STG 2e: 12 weeks:  Patient will complete laryngeal elevation exercises with min assist to increase laryngeal elevation, eppiglottic inversion and coordination reducing risk of aspiration. CTAR 1 set of 10 repetitions Mod to max assist   STG 2f: 12 Patient will complete Respiratory Muscle Training to include Expiratory Muscle Strength Training for 8 weeks to increase laryngeal elevation and coordination.       Discharge goal                       ASSESSMENT/PLAN      ASSESSMENT: Patient requires the skills of a therapist to provide mod to maximum assist to increase tongue base, laryngeal elevation and pharyngeal strength to promote a safe swallow decreasing risk of aspiration.    Progress: Patient able to tolerate increased repetitions of most exercises.    PLAN: continue plan of care     Progress Note Due Date:9/18/24  Recertification Due Date:10/18/24                SIGNATURE: SAM [place], [place] License #: [id number]  Electronically Signed on 9/12/2024            Adult Outpatient Rehabiliation

## 2024-09-12 NOTE — TELEPHONE ENCOUNTER
Unable to reach, unable to leave message. RN will try to see patient in office visit.     Alejandrina HARRIS RN  Ambulatory

## 2024-09-13 ENCOUNTER — OFFICE VISIT (OUTPATIENT)
Dept: RADIATION ONCOLOGY | Facility: HOSPITAL | Age: 59
End: 2024-09-13
Payer: COMMERCIAL

## 2024-09-13 VITALS
DIASTOLIC BLOOD PRESSURE: 75 MMHG | WEIGHT: 127 LBS | RESPIRATION RATE: 16 BRPM | SYSTOLIC BLOOD PRESSURE: 111 MMHG | BODY MASS INDEX: 21.8 KG/M2 | HEART RATE: 82 BPM | TEMPERATURE: 97.2 F | OXYGEN SATURATION: 92 %

## 2024-09-13 DIAGNOSIS — C32.1 MALIGNANT NEOPLASM OF SUPRAGLOTTIS: Primary | ICD-10-CM

## 2024-09-13 DIAGNOSIS — M32.9 LUPUS: ICD-10-CM

## 2024-09-13 PROCEDURE — G0463 HOSPITAL OUTPT CLINIC VISIT: HCPCS | Performed by: RADIOLOGY

## 2024-09-13 RX ORDER — PREDNISONE 10 MG/1
10 TABLET ORAL DAILY
Qty: 30 TABLET | Refills: 2 | Status: SHIPPED | OUTPATIENT
Start: 2024-09-13 | End: 2024-12-12

## 2024-09-13 NOTE — PROGRESS NOTES
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  This did not reveal any evidence of recurrent or metastatic disease.  She continues to experience somnolence that she denies is related to opioid analgesics.  She states that she does not get much rest at night and denies ever being diagnosed with sleep apnea.     Review of Systems: Review of Systems   Constitutional:  Positive for appetite change (Decreased, has an altered taste.) and fatigue (9/10). Negative for unexpected weight change.        Pt refused to get weight. States she was just weighed yesterday     HENT:  Positive for trouble swallowing (Occasional, with certain foods, dry foods.) and voice change (hoarseness, feels its worsened). Negative for congestion, rhinorrhea, sinus pressure, sinus pain, sore throat and tinnitus.         States feels like her ears are dry and itchy.  C/o thick secretions/saliva  Altered taste/smell  States feels like her throat and neck are swollen.     Eyes:  Positive for visual disturbance (Occasional blurred and double vision, ongoing).   Respiratory:  Positive for cough (occasional after eating), choking (Noted with dry foods) and shortness of breath (occasional, ongoing). Negative for chest tightness.         States that sometimes she has difficulty catching her breath, states feels like it goes along with her throat.     Cardiovascular:  Leg swelling: bilateral, states from venous stasis.   Gastrointestinal:  Positive for constipation (Educated on stool softeners/fiber.  Last bm yesterday.), diarrhea (Occasional, takes imodium prn) and nausea (Frequent). Negative for abdominal pain, anal bleeding, blood in stool and  vomiting.        States that she feels like she has fluid in her abdomen.  States she feels like her abdomen will gurgle from where the tube was removed.     Genitourinary:  Positive for difficulty urinating (occasional hesitancy). Negative for dysuria, frequency, hematuria and urgency.   Musculoskeletal:  Positive for arthralgias (Right hip and generalized pain), back pain, gait problem (States she has to bear crawl.) and neck pain (right side). Negative for neck stiffness.   Skin:  Positive for wound (venous stasis on bilateral legs). Negative for color change and rash.        Wound (diabetic Ulcer BLE)   Neurological:  Positive for speech difficulty. Negative for dizziness, weakness, light-headedness and headaches.        Falling asleep while asking questions and in the middle of speaking, Pt leaning over and forward in wc  aroused multiple times to lean back in the wc and pt becomes agitated.       Psychiatric/Behavioral:  Positive for agitation, decreased concentration and sleep disturbance (Due to pain).        The following portions of the patient's history were reviewed and updated as appropriate: allergies, current medications, past family history, past medical history, past social history, past surgical history and problem list.    Medications:     Current Outpatient Medications:     acetaminophen (Tylenol) 325 MG tablet, Take 1 tablet by mouth Every 6 (Six) Hours As Needed for Mild Pain, Headache or Fever., Disp: , Rfl:     albuterol sulfate  (90 Base) MCG/ACT inhaler, Inhale 2 puffs Every 4 (Four) Hours As Needed for Wheezing., Disp: 18 g, Rfl: 2    mupirocin (BACTROBAN) 2 % ointment, Apply 1 Application topically to the appropriate area as directed 3 (Three) Times a Day., Disp: 30 g, Rfl: 2    pain patient supplied pump, by Intrathecal route Continuous. Type of Medication: Hydromorphone 15 mg/ml and Bupivacaine 0.5 mg/ml Provider:Dr. Boudreaux Provider number: [phone] Basal Dose: Hydromorphone  7.518 mg/day Bupivacaine 0.93617 mg/day Pump capacity: 40ml ( MAX of Hydromorphone 9.456 mg/ day; Bupivacaine 0.99866 mg /day) Bolus Dose:Hydromorphone 0.500 mg, Bupivacaine 0.83640 mg Max activations: 4 per day Lockout 3 hours. 1 Bolus per every 3 hours  Next refill-  every 60 days 09/20/24  Alarm date- 09/27/24 Pump manufacture:[employer], Disp: , Rfl:     predniSONE (DELTASONE) 10 MG tablet, Take 1 tablet by mouth Daily for 90 days., Disp: 30 tablet, Rfl: 2    albuterol (PROVENTIL) 2.5 MG/0.5ML nebulizer solution, Take 2.5 mg by nebulization Every 4 (Four) Hours As Needed for Wheezing or Shortness of Air. (Patient not taking: Reported on 8/29/2024), Disp: 20 mL, Rfl: 0    clopidogrel (PLAVIX) 75 MG tablet, Take 1 tablet by mouth Daily. (Patient not taking: Reported on 9/13/2024), Disp: 90 tablet, Rfl: 2    furosemide (Lasix) 40 MG tablet, Take 1 tablet by mouth Daily. (Patient not taking: Reported on 9/13/2024), Disp: , Rfl:     metoprolol tartrate (LOPRESSOR) 25 MG tablet, Take 0.5 tablets by mouth 2 (Two) Times a Day. (Patient not taking: Reported on 8/29/2024), Disp: 60 tablet, Rfl: 11    nicotine (NICODERM CQ) 21 MG/24HR patch, Place 1 patch on the skin as directed by provider Daily. (Patient not taking: Reported on 8/29/2024), Disp: , Rfl:     O2 (OXYGEN), Inhale 1 (One) Time. (Patient not taking: Reported on 8/29/2024), Disp: , Rfl:     ondansetron ODT (ZOFRAN-ODT) 4 MG disintegrating tablet, Place 1 tablet on the tongue Every 8 (Eight) Hours As Needed for Nausea or Vomiting. (Patient not taking: Reported on 9/13/2024), Disp: 14 tablet, Rfl: 0    Allergies:   Allergies   Allergen Reactions    Amoxicillin Shortness Of Breath     Has tolerated Cefazolin, Ceftriaxone, and Cefepime -CONSTANZA Garner [Cefaclor] Shortness Of Breath     Has tolerated Cefazolin, Ceftriaxone, and Cefepime -Jermaine Montez RPH      Penicillins Shortness Of Breath     Has tolerated Cefazolin, Ceftriaxone, and Cefepime  -Jermaine Montez, [facility]    Sulfa Antibiotics Rash    Valium [Diazepam] Mental Status Change     DEPRESSED    Ambien [Zolpidem] Mental Status Change    Aspirin GI Intolerance    Ativan [Lorazepam] Mental Status Change    Benadryl [Diphenhydramine] Anxiety     AND MAKES HER HYPER    Biaxin [Clarithromycin] Unknown - Low Severity    Cephalexin Unknown - Low Severity    Clindamycin Unknown - Low Severity    Compazine [Prochlorperazine Edisylate] Rash    Contrast Dye (Echo Or Unknown Ct/Mr) Other (See Comments)     Caused pain in her arm    Doxycycline Rash    Nsaids GI Intolerance    Phenergan [Promethazine Hcl] GI Intolerance    Promethazine GI Intolerance    Vancomycin Itching       ECOG: (2) Ambulatory and capable of self care, unable to carry out work activity, up and about > 50% or waking hours  Quality of Life: 40 - Must Use Wheelchair Most of Time     Objective     Physical Exam:   Vital Signs:   Vitals:    09/13/24 0943   BP: 111/75   Pulse: 82   Resp: 16   Temp: 97.2 °F (36.2 °C)   TempSrc: Temporal   SpO2: 92%   Weight: 57.6 kg (127 lb)  Comment: per patient   PainSc:   7   PainLoc: Back     Body mass index is 21.8 kg/m².     Physical Exam  Constitutional:       General: She is not in acute distress.     Appearance: Normal appearance. She is not toxic-appearing.   HENT:      Head: Normocephalic and atraumatic.   Pulmonary:      Effort: Pulmonary effort is normal. No respiratory distress.   Neurological:      General: No focal deficit present.      Mental Status: She is alert and oriented to person, place, and time.      Cranial Nerves: No cranial nerve deficit.   Psychiatric:         Mood and Affect: Mood normal.         Behavior: Behavior normal.         Judgment: Judgment normal.                 Radiographs: NM PET/CT Skull Base to Mid Thigh    Result Date: 9/11/2024  No definite abnormal activity is seen in the neck to suggest recurrent disease. No abnormal adenopathy. There may be post radiation changes as  seen on CT neck 7/20/2024 and PET scan 5/6/2024. Compared with chest CT 8/22/2024, there has been near complete resolution in the right middle lobe and right lower lobe pneumonia. There are several new nodules and nodular densities seen in the right lower lobe measuring up to about 0.8 cm on image 166. These are not hypermetabolic and were not seen on 7/20/2024. There may be sequela from previous pneumonia. Recommend continued follow-up. No other definite abnormal activity. Electronically Signed: Manuelito Almanzar MD  9/11/2024 10:46 AM EDT  Workstation ID: [id number]    FL Video Swallow With Speech Single Contrast    Result Date: 8/23/2024  Impression: 1. Silent tracheal aspiration with thin liquid barium 2. Laryngeal penetration with nectar thick liquid barium Please consult speech pathology report for further details regarding the exam and for dietary recommendations. Report dictated by: Liz Pisano  I have personally reviewed this case and agree with the findings above: Electronically Signed: Odilon Sheriff MD  8/23/2024 3:30 PM EDT  Workstation ID: [id number]    CT Chest Without Contrast Diagnostic    Result Date: 8/22/2024  Impression: 1.Worsening of right lung airspace disease with shifting left lung airspace disease consistent with ongoing infectious/inflammatory process. 2.No acute process in the abdomen or pelvis. 3.Other stable chronic findings. Electronically Signed: Werner Linares MD  8/22/2024 3:31 PM EDT  Workstation ID: [id number]    CT Abdomen Pelvis Without Contrast    Result Date: 8/22/2024  Impression: 1.Worsening of right lung airspace disease with shifting left lung airspace disease consistent with ongoing infectious/inflammatory process. 2.No acute process in the abdomen or pelvis. 3.Other stable chronic findings. Electronically Signed: Werner Linares MD  8/22/2024 3:31 PM EDT  Workstation ID: [id number]    CT Head Without Contrast    Result Date: 8/21/2024  Impression: No acute intracranial  pathology. Electronically Signed: Sony Escobedo MD  8/21/2024 8:39 PM EDT  Workstation ID: [id number]    XR Chest 1 View    Result Date: 8/21/2024  No acute cardiopulmonary disease. Electronically Signed: Sony Escobedo MD  8/21/2024 5:10 PM EDT  Workstation ID: [id number]    XR Chest 1 View    Result Date: 7/30/2024  No acute infiltrate is appreciated.    Please note that portions of this note were completed with a voice recognition program.   Electronically Signed By-Sony Hood MD On:7/30/2024 4:04 AM      CT Chest Without Contrast Diagnostic    Result Date: 7/20/2024  Impression: 1. Patchy areas of groundglass opacity in the lung fields likely infectious or inflammatory process. Recommend a follow-up examination after treatment to confirm resolution. 2. Emphysema. Electronically Signed: José Cloud MD  7/20/2024 10:56 AM EDT  Workstation ID: [id number]    CT Soft Tissue Neck Without Contrast    Result Date: 7/20/2024  Impression: Examination is limited due to lack of IV contrast administration. Nonspecific prominence of the vocal folds and aryepiglottic folds, possibly related to post therapeutic change or underlying mass. Please correlate clinically to exclude laryngitis/laryngeal edema. Direct visualization may be beneficial. Further evaluation with contrast-enhanced CT may also be considered. Sclerosis within the C5-C7 vertebral bodies is similar since 10/25/2023. Electronically Signed: Jevon Baez MD  7/20/2024 10:40 AM EDT  Workstation ID: [id number]    XR Chest 1 View    Result Date: 7/20/2024  No active disease. Electronically Signed: José Cloud MD  7/20/2024 8:12 AM EDT  Workstation ID: [id number]    I personally reviewed the PET/CT performed on 9/10/2024.  The pertinent findings are as above in HPI.      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  She has  involvement of the laryngeal side of the epiglottis.  She is status post external beam radiotherapy, having received 70 Pollack in 35 daily fractions over the course of 66 elapsed days.  She has without radiographic evidence of disease per recent PET/CT.    We discussed the findings of the recent PET/CT.  I recommended routine follow-up with myself as well as ENT.  She will continue to follow-up with her primary care provider as well as Dr. Mancuso.  As she missed her appointment with her PCP, she has requested refill of her steroids.  I encouraged her to contact her PCP for follow-up appointment so this prescription can be continued in her condition monitored per that prescribing physician.  We once again discussed her dysphagia and she inquired as to whether dentures would help.  I encouraged her to obtain dentures if at all possible.  I did not recommend esophageal stretching at this time as her issue appears to be oropharyngeal potentially exacerbated by continued cigarette smoking.  I will see her in follow-up in 6 months.  She was encouraged to contact me in the interim with any questions or concerns regarding her care.      Goyo Nunez MD  Radiation Oncology  [facility]    This document has been signed by Goyo Nunez MD on September 13, 2024 10:20 EDT

## 2024-09-20 ENCOUNTER — TELEPHONE (OUTPATIENT)
Dept: FAMILY MEDICINE CLINIC | Facility: CLINIC | Age: 59
End: 2024-09-20

## 2024-09-23 DIAGNOSIS — F41.1 GAD (GENERALIZED ANXIETY DISORDER): Primary | ICD-10-CM

## 2024-09-25 ENCOUNTER — TELEPHONE (OUTPATIENT)
Dept: RADIATION ONCOLOGY | Facility: HOSPITAL | Age: 59
End: 2024-09-25
Payer: COMMERCIAL

## 2024-09-25 RX ORDER — ALPRAZOLAM 0.5 MG
0.25 TABLET ORAL 2 TIMES DAILY PRN
Qty: 60 TABLET | Refills: 0 | Status: SHIPPED | OUTPATIENT
Start: 2024-09-25 | End: 2024-11-24

## 2024-10-02 DIAGNOSIS — Z12.31 SCREENING MAMMOGRAM FOR BREAST CANCER: Primary | ICD-10-CM

## 2024-10-03 ENCOUNTER — APPOINTMENT (OUTPATIENT)
Dept: RADIATION ONCOLOGY | Facility: HOSPITAL | Age: 59
End: 2024-10-03
Payer: COMMERCIAL

## 2024-10-09 ENCOUNTER — TELEPHONE (OUTPATIENT)
Dept: GASTROENTEROLOGY | Facility: CLINIC | Age: 59
End: 2024-10-09

## 2024-10-09 NOTE — TELEPHONE ENCOUNTER
I Contacted Isha Llanes 1965 regarding the appointment no show with LUZ Collins on 10/09/2024@11:30. Patient is aware that there is a 24-hour cancellation policy and understands that a no-show letter will be mailed to them at the address on file.The patient has been rescheduled 37/27/2025@1:45.

## 2024-10-10 ENCOUNTER — HOSPITAL ENCOUNTER (OUTPATIENT)
Dept: RADIATION ONCOLOGY | Facility: HOSPITAL | Age: 59
Setting detail: RADIATION/ONCOLOGY SERIES
Discharge: HOME | End: 2024-10-10
Payer: COMMERCIAL

## 2024-10-10 DIAGNOSIS — R13.12 OROPHARYNGEAL DYSPHAGIA: Primary | ICD-10-CM

## 2024-11-06 ENCOUNTER — APPOINTMENT (OUTPATIENT)
Dept: CT IMAGING | Facility: HOSPITAL | Age: 59
End: 2024-11-06
Payer: COMMERCIAL

## 2024-11-06 ENCOUNTER — TRANSCRIBE ORDERS (OUTPATIENT)
Dept: ADMINISTRATIVE | Facility: HOSPITAL | Age: 59
End: 2024-11-06
Payer: COMMERCIAL

## 2024-11-06 DIAGNOSIS — R07.0 THROAT PAIN: Primary | ICD-10-CM

## 2024-11-06 DIAGNOSIS — R47.9 SPEECH DISTURBANCE, UNSPECIFIED TYPE: ICD-10-CM

## 2024-11-06 DIAGNOSIS — J96.11 CHRONIC HYPOXEMIC RESPIRATORY FAILURE: ICD-10-CM

## 2024-11-06 LAB
ALBUMIN SERPL-MCNC: 3.7 G/DL (ref 3.5–5.2)
ALBUMIN/GLOB SERPL: 1.1 G/DL
ALP SERPL-CCNC: 63 U/L (ref 39–117)
ALT SERPL W P-5'-P-CCNC: 8 U/L (ref 1–33)
ANION GAP SERPL CALCULATED.3IONS-SCNC: 9 MMOL/L (ref 5–15)
AST SERPL-CCNC: 13 U/L (ref 1–32)
BASOPHILS # BLD AUTO: 0.02 10*3/MM3 (ref 0–0.2)
BASOPHILS NFR BLD AUTO: 0.3 % (ref 0–1.5)
BILIRUB SERPL-MCNC: <0.2 MG/DL (ref 0–1.2)
BUN SERPL-MCNC: 12 MG/DL (ref 6–20)
BUN/CREAT SERPL: 24 (ref 7–25)
CALCIUM SPEC-SCNC: 9.2 MG/DL (ref 8.6–10.5)
CHLORIDE SERPL-SCNC: 100 MMOL/L (ref 98–107)
CO2 SERPL-SCNC: 31 MMOL/L (ref 22–29)
CREAT SERPL-MCNC: 0.5 MG/DL (ref 0.57–1)
DEPRECATED RDW RBC AUTO: 40.6 FL (ref 37–54)
EGFRCR SERPLBLD CKD-EPI 2021: 108.9 ML/MIN/1.73
EOSINOPHIL # BLD AUTO: 0.09 10*3/MM3 (ref 0–0.4)
EOSINOPHIL NFR BLD AUTO: 1.3 % (ref 0.3–6.2)
ERYTHROCYTE [DISTWIDTH] IN BLOOD BY AUTOMATED COUNT: 12.7 % (ref 12.3–15.4)
GLOBULIN UR ELPH-MCNC: 3.3 GM/DL
GLUCOSE SERPL-MCNC: 124 MG/DL (ref 65–99)
HCT VFR BLD AUTO: 40.3 % (ref 34–46.6)
HGB BLD-MCNC: 12.5 G/DL (ref 12–15.9)
HOLD SPECIMEN: NORMAL
HOLD SPECIMEN: NORMAL
IMM GRANULOCYTES # BLD AUTO: 0.04 10*3/MM3 (ref 0–0.05)
IMM GRANULOCYTES NFR BLD AUTO: 0.6 % (ref 0–0.5)
LYMPHOCYTES # BLD AUTO: 0.94 10*3/MM3 (ref 0.7–3.1)
LYMPHOCYTES NFR BLD AUTO: 13.6 % (ref 19.6–45.3)
MCH RBC QN AUTO: 27.3 PG (ref 26.6–33)
MCHC RBC AUTO-ENTMCNC: 31 G/DL (ref 31.5–35.7)
MCV RBC AUTO: 88 FL (ref 79–97)
MONOCYTES # BLD AUTO: 0.51 10*3/MM3 (ref 0.1–0.9)
MONOCYTES NFR BLD AUTO: 7.4 % (ref 5–12)
NEUTROPHILS NFR BLD AUTO: 5.29 10*3/MM3 (ref 1.7–7)
NEUTROPHILS NFR BLD AUTO: 76.8 % (ref 42.7–76)
NRBC BLD AUTO-RTO: 0 /100 WBC (ref 0–0.2)
PLATELET # BLD AUTO: 224 10*3/MM3 (ref 140–450)
PMV BLD AUTO: 9.6 FL (ref 6–12)
POTASSIUM SERPL-SCNC: 4 MMOL/L (ref 3.5–5.2)
PROT SERPL-MCNC: 7 G/DL (ref 6–8.5)
RBC # BLD AUTO: 4.58 10*6/MM3 (ref 3.77–5.28)
SODIUM SERPL-SCNC: 140 MMOL/L (ref 136–145)
WBC NRBC COR # BLD AUTO: 6.89 10*3/MM3 (ref 3.4–10.8)
WHOLE BLOOD HOLD COAG: NORMAL
WHOLE BLOOD HOLD SPECIMEN: NORMAL

## 2024-11-06 PROCEDURE — 85025 COMPLETE CBC W/AUTO DIFF WBC: CPT | Performed by: EMERGENCY MEDICINE

## 2024-11-06 PROCEDURE — 99285 EMERGENCY DEPT VISIT HI MDM: CPT

## 2024-11-06 PROCEDURE — 36415 COLL VENOUS BLD VENIPUNCTURE: CPT | Performed by: EMERGENCY MEDICINE

## 2024-11-06 PROCEDURE — 80053 COMPREHEN METABOLIC PANEL: CPT | Performed by: EMERGENCY MEDICINE

## 2024-11-07 ENCOUNTER — READMISSION MANAGEMENT (OUTPATIENT)
Dept: CALL CENTER | Facility: HOSPITAL | Age: 59
End: 2024-11-07
Payer: COMMERCIAL

## 2024-11-07 ENCOUNTER — APPOINTMENT (OUTPATIENT)
Dept: CT IMAGING | Facility: HOSPITAL | Age: 59
End: 2024-11-07
Payer: COMMERCIAL

## 2024-11-07 ENCOUNTER — HOSPITAL ENCOUNTER (OUTPATIENT)
Facility: HOSPITAL | Age: 59
Setting detail: OBSERVATION
Discharge: HOME | End: 2024-11-07
Attending: EMERGENCY MEDICINE | Admitting: STUDENT IN AN ORGANIZED HEALTH CARE EDUCATION/TRAINING PROGRAM
Payer: COMMERCIAL

## 2024-11-07 VITALS
RESPIRATION RATE: 20 BRPM | TEMPERATURE: 97.3 F | HEART RATE: 97 BPM | BODY MASS INDEX: 20.44 KG/M2 | DIASTOLIC BLOOD PRESSURE: 71 MMHG | HEIGHT: 66 IN | SYSTOLIC BLOOD PRESSURE: 107 MMHG | WEIGHT: 127.21 LBS | OXYGEN SATURATION: 91 %

## 2024-11-07 DIAGNOSIS — J39.2 PHARYNGEAL SWELLING: Primary | ICD-10-CM

## 2024-11-07 DIAGNOSIS — C81.91 HODGKIN LYMPHOMA OF LYMPH NODES OF NECK, UNSPECIFIED HODGKIN LYMPHOMA TYPE: ICD-10-CM

## 2024-11-07 DIAGNOSIS — M54.2 ANTERIOR NECK PAIN: ICD-10-CM

## 2024-11-07 LAB
ARTERIAL PATENCY WRIST A: ABNORMAL
ATMOSPHERIC PRESS: 742.8 MMHG
BASE EXCESS BLDA CALC-SCNC: 7.7 MMOL/L (ref -2–2)
BDY SITE: ABNORMAL
BUN BLDA-MCNC: 13 MG/DL (ref 8–23)
CA-I BLDA-SCNC: 1.27 MMOL/L (ref 1.13–1.32)
CHLORIDE BLDA-SCNC: 98 MMOL/L (ref 98–107)
CO2 BLDA-SCNC: >34.54 MMOL/L (ref 23–27)
CREAT BLDA-MCNC: 0.56 MG/DL (ref 0.6–1.3)
D-LACTATE SERPL-SCNC: 1.2 MMOL/L
GAS FLOW AIRWAY: 2 LPM
GLUCOSE BLDC GLUCOMTR-MCNC: 134 MG/DL (ref 70–99)
GLUCOSE BLDC GLUCOMTR-MCNC: 140 MG/DL (ref 70–99)
GLUCOSE BLDC GLUCOMTR-MCNC: 161 MG/DL (ref 65–99)
HCO3 BLDA-SCNC: 34.8 MMOL/L (ref 22–26)
HCT VFR BLD CALC: 38 % (ref 38–51)
HEMODILUTION: NO
HGB BLDA-MCNC: 13 G/DL (ref 12–18)
INHALED O2 CONCENTRATION: 28 %
MODALITY: ABNORMAL
PCO2 BLDA: 59 MM HG (ref 35–45)
PH BLDA: 7.38 PH UNITS (ref 7.35–7.45)
PO2 BLD: 390 MM[HG] (ref 0–500)
PO2 BLDA: 109.3 MM HG (ref 80–100)
POTASSIUM BLDA-SCNC: 3.7 MMOL/L (ref 3.5–5)
SAO2 % BLDCOA: 98 % (ref 95–99)
SODIUM BLD-SCNC: 141 MMOL/L (ref 131–143)

## 2024-11-07 PROCEDURE — 25010000002 DEXAMETHASONE SODIUM PHOSPHATE 10 MG/ML SOLUTION: Performed by: EMERGENCY MEDICINE

## 2024-11-07 PROCEDURE — 80047 BASIC METABLC PNL IONIZED CA: CPT

## 2024-11-07 PROCEDURE — 82803 BLOOD GASES ANY COMBINATION: CPT

## 2024-11-07 PROCEDURE — 96374 THER/PROPH/DIAG INJ IV PUSH: CPT

## 2024-11-07 PROCEDURE — 94660 CPAP INITIATION&MGMT: CPT

## 2024-11-07 PROCEDURE — G0378 HOSPITAL OBSERVATION PER HR: HCPCS

## 2024-11-07 PROCEDURE — 70490 CT SOFT TISSUE NECK W/O DYE: CPT

## 2024-11-07 PROCEDURE — 36600 WITHDRAWAL OF ARTERIAL BLOOD: CPT

## 2024-11-07 PROCEDURE — 94799 UNLISTED PULMONARY SVC/PX: CPT

## 2024-11-07 PROCEDURE — 96375 TX/PRO/DX INJ NEW DRUG ADDON: CPT

## 2024-11-07 PROCEDURE — 82948 REAGENT STRIP/BLOOD GLUCOSE: CPT

## 2024-11-07 PROCEDURE — 25010000002 KETOROLAC TROMETHAMINE PER 15 MG: Performed by: STUDENT IN AN ORGANIZED HEALTH CARE EDUCATION/TRAINING PROGRAM

## 2024-11-07 PROCEDURE — 94761 N-INVAS EAR/PLS OXIMETRY MLT: CPT

## 2024-11-07 PROCEDURE — 99235 HOSP IP/OBS SAME DATE MOD 70: CPT | Performed by: INTERNAL MEDICINE

## 2024-11-07 PROCEDURE — 83605 ASSAY OF LACTIC ACID: CPT

## 2024-11-07 RX ORDER — SODIUM CHLORIDE 9 MG/ML
40 INJECTION, SOLUTION INTRAVENOUS AS NEEDED
Status: DISCONTINUED | OUTPATIENT
Start: 2024-11-07 | End: 2024-11-07 | Stop reason: HOSPADM

## 2024-11-07 RX ORDER — IBUPROFEN 600 MG/1
1 TABLET ORAL
Status: DISCONTINUED | OUTPATIENT
Start: 2024-11-07 | End: 2024-11-07 | Stop reason: HOSPADM

## 2024-11-07 RX ORDER — BISACODYL 10 MG
10 SUPPOSITORY, RECTAL RECTAL DAILY PRN
Status: DISCONTINUED | OUTPATIENT
Start: 2024-11-07 | End: 2024-11-07 | Stop reason: HOSPADM

## 2024-11-07 RX ORDER — PREDNISONE 20 MG/1
20 TABLET ORAL DAILY
Status: ON HOLD | COMMUNITY
End: 2024-11-07

## 2024-11-07 RX ORDER — DEXAMETHASONE SODIUM PHOSPHATE 10 MG/ML
10 INJECTION, SOLUTION INTRAMUSCULAR; INTRAVENOUS ONCE
Status: COMPLETED | OUTPATIENT
Start: 2024-11-07 | End: 2024-11-07

## 2024-11-07 RX ORDER — POLYETHYLENE GLYCOL 3350 17 G/17G
17 POWDER, FOR SOLUTION ORAL DAILY PRN
Status: DISCONTINUED | OUTPATIENT
Start: 2024-11-07 | End: 2024-11-07 | Stop reason: HOSPADM

## 2024-11-07 RX ORDER — CETIRIZINE HYDROCHLORIDE 10 MG/1
10 TABLET ORAL DAILY
COMMUNITY

## 2024-11-07 RX ORDER — DEXTROSE MONOHYDRATE 25 G/50ML
25 INJECTION, SOLUTION INTRAVENOUS
Status: DISCONTINUED | OUTPATIENT
Start: 2024-11-07 | End: 2024-11-07 | Stop reason: HOSPADM

## 2024-11-07 RX ORDER — IPRATROPIUM BROMIDE AND ALBUTEROL SULFATE 2.5; .5 MG/3ML; MG/3ML
3 SOLUTION RESPIRATORY (INHALATION) EVERY 6 HOURS PRN
Status: DISCONTINUED | OUTPATIENT
Start: 2024-11-07 | End: 2024-11-07 | Stop reason: HOSPADM

## 2024-11-07 RX ORDER — ALPRAZOLAM 0.25 MG/1
0.25 TABLET ORAL 2 TIMES DAILY PRN
Status: DISCONTINUED | OUTPATIENT
Start: 2024-11-07 | End: 2024-11-07 | Stop reason: HOSPADM

## 2024-11-07 RX ORDER — SODIUM CHLORIDE 0.9 % (FLUSH) 0.9 %
10 SYRINGE (ML) INJECTION EVERY 12 HOURS SCHEDULED
Status: DISCONTINUED | OUTPATIENT
Start: 2024-11-07 | End: 2024-11-07 | Stop reason: HOSPADM

## 2024-11-07 RX ORDER — ALBUTEROL SULFATE 5 MG/ML
2.5 SOLUTION RESPIRATORY (INHALATION) EVERY 4 HOURS PRN
Status: DISCONTINUED | OUTPATIENT
Start: 2024-11-07 | End: 2024-11-07 | Stop reason: HOSPADM

## 2024-11-07 RX ORDER — BISACODYL 5 MG/1
5 TABLET, DELAYED RELEASE ORAL DAILY PRN
Status: DISCONTINUED | OUTPATIENT
Start: 2024-11-07 | End: 2024-11-07 | Stop reason: HOSPADM

## 2024-11-07 RX ORDER — KETOROLAC TROMETHAMINE 30 MG/ML
15 INJECTION, SOLUTION INTRAMUSCULAR; INTRAVENOUS EVERY 6 HOURS PRN
Status: DISCONTINUED | OUTPATIENT
Start: 2024-11-07 | End: 2024-11-07 | Stop reason: HOSPADM

## 2024-11-07 RX ORDER — NICOTINE POLACRILEX 4 MG
15 LOZENGE BUCCAL
Status: DISCONTINUED | OUTPATIENT
Start: 2024-11-07 | End: 2024-11-07 | Stop reason: HOSPADM

## 2024-11-07 RX ORDER — ARFORMOTEROL TARTRATE 15 UG/2ML
15 SOLUTION RESPIRATORY (INHALATION)
Status: DISCONTINUED | OUTPATIENT
Start: 2024-11-07 | End: 2024-11-07 | Stop reason: HOSPADM

## 2024-11-07 RX ORDER — ACETAMINOPHEN 325 MG/1
650 TABLET ORAL EVERY 4 HOURS PRN
Status: DISCONTINUED | OUTPATIENT
Start: 2024-11-07 | End: 2024-11-07 | Stop reason: HOSPADM

## 2024-11-07 RX ORDER — NITROGLYCERIN 0.4 MG/1
0.4 TABLET SUBLINGUAL
Status: DISCONTINUED | OUTPATIENT
Start: 2024-11-07 | End: 2024-11-07 | Stop reason: HOSPADM

## 2024-11-07 RX ORDER — AMOXICILLIN 250 MG
2 CAPSULE ORAL 2 TIMES DAILY PRN
Status: DISCONTINUED | OUTPATIENT
Start: 2024-11-07 | End: 2024-11-07 | Stop reason: HOSPADM

## 2024-11-07 RX ORDER — HYDROCODONE BITARTRATE AND ACETAMINOPHEN 5; 325 MG/1; MG/1
1 TABLET ORAL EVERY 4 HOURS PRN
Status: DISCONTINUED | OUTPATIENT
Start: 2024-11-07 | End: 2024-11-07 | Stop reason: HOSPADM

## 2024-11-07 RX ORDER — DEXAMETHASONE SODIUM PHOSPHATE 10 MG/ML
10 INJECTION, SOLUTION INTRAMUSCULAR; INTRAVENOUS DAILY
Status: DISCONTINUED | OUTPATIENT
Start: 2024-11-08 | End: 2024-11-07 | Stop reason: HOSPADM

## 2024-11-07 RX ORDER — KETOROLAC TROMETHAMINE 15 MG/ML
15 INJECTION, SOLUTION INTRAMUSCULAR; INTRAVENOUS ONCE
Status: COMPLETED | OUTPATIENT
Start: 2024-11-07 | End: 2024-11-07

## 2024-11-07 RX ORDER — SODIUM CHLORIDE 0.9 % (FLUSH) 0.9 %
10 SYRINGE (ML) INJECTION AS NEEDED
Status: DISCONTINUED | OUTPATIENT
Start: 2024-11-07 | End: 2024-11-07 | Stop reason: HOSPADM

## 2024-11-07 RX ORDER — PREDNISONE 20 MG/1
40 TABLET ORAL DAILY
Qty: 10 TABLET | Refills: 0 | Status: SHIPPED | OUTPATIENT
Start: 2024-11-07 | End: 2024-11-13

## 2024-11-07 RX ADMIN — KETOROLAC TROMETHAMINE 15 MG: 15 INJECTION, SOLUTION INTRAMUSCULAR; INTRAVENOUS at 03:06

## 2024-11-07 RX ADMIN — DEXAMETHASONE SODIUM PHOSPHATE 10 MG: 10 INJECTION INTRAMUSCULAR; INTRAVENOUS at 02:08

## 2024-11-07 RX ADMIN — Medication 10 ML: at 09:28
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
## 2024-11-07 NOTE — ED PROVIDER NOTES
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 history, physical, and imperical data, the patient meets criteria for inpatient admission to the hospital.        Final diagnoses:   Pharyngeal swelling   Hodgkin lymphoma of lymph nodes of neck, unspecified Hodgkin lymphoma type        ED Disposition       ED Disposition   Decision to Admit    Condition   --    Comment   Level of Care: Telemetry [5]   Diagnosis: Anterior neck pain [011324]                 This medical record created using voice recognition software.             Manuelito Lora MD  11/07/24 0741

## 2024-11-07 NOTE — PLAN OF CARE
Goal Outcome Evaluation:  Plan of Care Reviewed With: patient, child        Progress: improving  Outcome Evaluation: Patient alert and oriented throughout shift. VSS. Patient up to bedside commode with x1 assist. Plan of care explained to daughter and patient at bedside. Patient to discharge home this afternoon.
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
## 2024-11-07 NOTE — PLAN OF CARE
Goal Outcome Evaluation:              Outcome Evaluation: Patient arrived from ED at 0430. Pt. only responds to painful stimuli and only stays awake for seconds. Pt. is on BiPap. VSS. Unable to answer admission questions at this time. Continue care per Plan of Care.
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
## 2024-11-08 NOTE — OUTREACH NOTE
Prep Survey      Flowsheet Row Responses   [facility] discharged from? [facility]   Is LACE score < 7 ? No   Eligibility Readm Mgmt   Discharge diagnosis Anterior neck pain  Principal problem   Does the patient have one of the following disease processes/diagnoses(primary or secondary)? Other   Does the patient have Home health ordered? No   Is there a DME ordered? No   Prep survey completed? Yes            HAKEEM SANCHEZ - Registered Nurse

## 2024-11-12 ENCOUNTER — READMISSION MANAGEMENT (OUTPATIENT)
Dept: CALL CENTER | Facility: HOSPITAL | Age: 59
End: 2024-11-12
Payer: COMMERCIAL

## 2024-11-12 NOTE — OUTREACH NOTE
Medical Week 1 Survey      Flowsheet Row Responses   [facility] facility patient discharged from? [facility]   Does the patient have one of the following disease processes/diagnoses(primary or secondary)? Other   Week 1 attempt successful? No   Unsuccessful attempts Attempt 1            DORIE CRUZ - Registered Nurse

## 2024-11-19 ENCOUNTER — READMISSION MANAGEMENT (OUTPATIENT)
Dept: CALL CENTER | Facility: HOSPITAL | Age: 59
End: 2024-11-19
Payer: COMMERCIAL

## 2024-11-19 NOTE — OUTREACH NOTE
Medical Week 2 Survey      Flowsheet Row Responses   [facility] patient discharged from? [facility]   Does the patient have one of the following disease processes/diagnoses(primary or secondary)? Other   Week 2 attempt successful? Yes   Call start time 1305   Discharge diagnosis Anterior neck pain  Principal problem   Call end time 1307   Meds reviewed with patient/caregiver? Yes   Does the patient have all medications ordered at discharge? No   Medication comments Patient reports that she did not get the Plavix   Comments Patient reports having continued throat pain making difficult to communicate, did not want dtr contacted.   Week 2 Call Completed? Yes   Revoked No further contact(revokes)-requires comment   Graduated/Revoked comments Patient call ended abruptly.   Call end time 1307            Sara NEGRON - Registered Nurse

## 2024-12-07 ENCOUNTER — APPOINTMENT (OUTPATIENT)
Dept: CT IMAGING | Facility: HOSPITAL | Age: 59
End: 2024-12-07
Payer: COMMERCIAL

## 2024-12-07 ENCOUNTER — APPOINTMENT (OUTPATIENT)
Dept: GENERAL RADIOLOGY | Facility: HOSPITAL | Age: 59
End: 2024-12-07
Payer: COMMERCIAL

## 2024-12-07 ENCOUNTER — HOSPITAL ENCOUNTER (INPATIENT)
Facility: HOSPITAL | Age: 59
LOS: 5 days | Discharge: HOME | End: 2024-12-12
Attending: EMERGENCY MEDICINE
Payer: COMMERCIAL

## 2024-12-07 DIAGNOSIS — G89.4 CHRONIC PAIN SYNDROME: ICD-10-CM

## 2024-12-07 DIAGNOSIS — G47.00 INSOMNIA, UNSPECIFIED TYPE: ICD-10-CM

## 2024-12-07 DIAGNOSIS — U07.1 COVID-19: ICD-10-CM

## 2024-12-07 DIAGNOSIS — Z78.9 DECREASED ACTIVITIES OF DAILY LIVING (ADL): ICD-10-CM

## 2024-12-07 DIAGNOSIS — R06.02 SHORTNESS OF BREATH: Primary | ICD-10-CM

## 2024-12-07 DIAGNOSIS — R13.12 OROPHARYNGEAL DYSPHAGIA: ICD-10-CM

## 2024-12-07 DIAGNOSIS — R26.2 DIFFICULTY WALKING: ICD-10-CM

## 2024-12-07 PROBLEM — R09.02 HYPOXEMIA: Status: ACTIVE | Noted: 2024-12-07

## 2024-12-07 LAB
ALBUMIN SERPL-MCNC: 3.9 G/DL (ref 3.5–5.2)
ALBUMIN/GLOB SERPL: 1.2 G/DL
ALP SERPL-CCNC: 62 U/L (ref 39–117)
ALT SERPL W P-5'-P-CCNC: 13 U/L (ref 1–33)
ANION GAP SERPL CALCULATED.3IONS-SCNC: 10.5 MMOL/L (ref 5–15)
ARTERIAL PATENCY WRIST A: ABNORMAL
ARTERIAL PATENCY WRIST A: POSITIVE
AST SERPL-CCNC: 23 U/L (ref 1–32)
ATMOSPHERIC PRESS: 746.3 MMHG
ATMOSPHERIC PRESS: 753.2 MMHG
B PARAPERT DNA SPEC QL NAA+PROBE: NOT DETECTED
B PERT DNA SPEC QL NAA+PROBE: NOT DETECTED
BASE EXCESS BLDA CALC-SCNC: 6 MMOL/L (ref -2–2)
BASE EXCESS BLDA CALC-SCNC: 7.5 MMOL/L (ref -2–2)
BASOPHILS # BLD AUTO: 0.01 10*3/MM3 (ref 0–0.2)
BASOPHILS NFR BLD AUTO: 0.2 % (ref 0–1.5)
BDY SITE: ABNORMAL
BDY SITE: ABNORMAL
BILIRUB SERPL-MCNC: <0.2 MG/DL (ref 0–1.2)
BILIRUB UR QL STRIP: NEGATIVE
BUN SERPL-MCNC: 11 MG/DL (ref 6–20)
BUN/CREAT SERPL: 19 (ref 7–25)
C PNEUM DNA NPH QL NAA+NON-PROBE: NOT DETECTED
CA-I BLDA-SCNC: 1.18 MMOL/L (ref 1.13–1.32)
CA-I BLDA-SCNC: 1.23 MMOL/L (ref 1.13–1.32)
CALCIUM SPEC-SCNC: 8.8 MG/DL (ref 8.6–10.5)
CHLORIDE BLDA-SCNC: 97 MMOL/L (ref 98–107)
CHLORIDE BLDA-SCNC: 97 MMOL/L (ref 98–107)
CHLORIDE SERPL-SCNC: 96 MMOL/L (ref 98–107)
CLARITY UR: CLEAR
CO2 SERPL-SCNC: 29.5 MMOL/L (ref 22–29)
COLOR UR: YELLOW
CREAT SERPL-MCNC: 0.58 MG/DL (ref 0.57–1)
CRP SERPL-MCNC: 10.88 MG/DL (ref 0–0.5)
D DIMER PPP FEU-MCNC: 0.69 MCGFEU/ML (ref 0–0.59)
D-LACTATE SERPL-SCNC: 0.7 MMOL/L
D-LACTATE SERPL-SCNC: 0.8 MMOL/L
D-LACTATE SERPL-SCNC: 1.4 MMOL/L (ref 0.5–2)
DEPRECATED RDW RBC AUTO: 47 FL (ref 37–54)
EGFRCR SERPLBLD CKD-EPI 2021: 104.4 ML/MIN/1.73
EOSINOPHIL # BLD AUTO: 0.03 10*3/MM3 (ref 0–0.4)
EOSINOPHIL NFR BLD AUTO: 0.5 % (ref 0.3–6.2)
ERYTHROCYTE [DISTWIDTH] IN BLOOD BY AUTOMATED COUNT: 14.2 % (ref 12.3–15.4)
FERRITIN SERPL-MCNC: 151.2 NG/ML (ref 13–150)
FLUAV SUBTYP SPEC NAA+PROBE: NOT DETECTED
FLUAV SUBTYP SPEC NAA+PROBE: NOT DETECTED
FLUBV RNA ISLT QL NAA+PROBE: NOT DETECTED
FLUBV RNA ISLT QL NAA+PROBE: NOT DETECTED
GAS FLOW AIRWAY: 3.5 LPM
GAS FLOW AIRWAY: 4 LPM
GEN 5 1HR TROPONIN T REFLEX: 21 NG/L
GLOBULIN UR ELPH-MCNC: 3.3 GM/DL
GLUCOSE BLDC GLUCOMTR-MCNC: 128 MG/DL (ref 70–99)
GLUCOSE BLDC GLUCOMTR-MCNC: 143 MG/DL (ref 65–99)
GLUCOSE BLDC GLUCOMTR-MCNC: 165 MG/DL (ref 70–99)
GLUCOSE BLDC GLUCOMTR-MCNC: 223 MG/DL (ref 70–99)
GLUCOSE BLDC GLUCOMTR-MCNC: 272 MG/DL (ref 65–99)
GLUCOSE SERPL-MCNC: 127 MG/DL (ref 65–99)
GLUCOSE UR STRIP-MCNC: NEGATIVE MG/DL
HADV DNA SPEC NAA+PROBE: NOT DETECTED
HCO3 BLDA-SCNC: 34.2 MMOL/L (ref 22–26)
HCO3 BLDA-SCNC: 35.2 MMOL/L (ref 22–26)
HCOV 229E RNA SPEC QL NAA+PROBE: NOT DETECTED
HCOV HKU1 RNA SPEC QL NAA+PROBE: NOT DETECTED
HCOV NL63 RNA SPEC QL NAA+PROBE: NOT DETECTED
HCOV OC43 RNA SPEC QL NAA+PROBE: NOT DETECTED
HCT VFR BLD AUTO: 41.5 % (ref 34–46.6)
HCT VFR BLD CALC: 44 % (ref 38–51)
HCT VFR BLD CALC: 44 % (ref 38–51)
HEMODILUTION: NO
HEMODILUTION: NO
HGB BLD-MCNC: 12.4 G/DL (ref 12–15.9)
HGB BLDA-MCNC: 14.9 G/DL (ref 12–18)
HGB BLDA-MCNC: 15.1 G/DL (ref 12–18)
HGB UR QL STRIP.AUTO: NEGATIVE
HMPV RNA NPH QL NAA+NON-PROBE: NOT DETECTED
HOLD SPECIMEN: NORMAL
HOLD SPECIMEN: NORMAL
HPIV1 RNA ISLT QL NAA+PROBE: NOT DETECTED
HPIV2 RNA SPEC QL NAA+PROBE: NOT DETECTED
HPIV3 RNA NPH QL NAA+PROBE: NOT DETECTED
HPIV4 P GENE NPH QL NAA+PROBE: NOT DETECTED
IMM GRANULOCYTES # BLD AUTO: 0.04 10*3/MM3 (ref 0–0.05)
IMM GRANULOCYTES NFR BLD AUTO: 0.7 % (ref 0–0.5)
INHALED O2 CONCENTRATION: 36 %
KETONES UR QL STRIP: NEGATIVE
LDH SERPL-CCNC: 403 U/L (ref 135–214)
LEUKOCYTE ESTERASE UR QL STRIP.AUTO: NEGATIVE
LYMPHOCYTES # BLD AUTO: 0.51 10*3/MM3 (ref 0.7–3.1)
LYMPHOCYTES NFR BLD AUTO: 9 % (ref 19.6–45.3)
Lab: ABNORMAL
Lab: ABNORMAL
M PNEUMO IGG SER IA-ACNC: NOT DETECTED
MAGNESIUM SERPL-MCNC: 1.7 MG/DL (ref 1.6–2.6)
MCH RBC QN AUTO: 27 PG (ref 26.6–33)
MCHC RBC AUTO-ENTMCNC: 29.9 G/DL (ref 31.5–35.7)
MCV RBC AUTO: 90.4 FL (ref 79–97)
MODALITY: ABNORMAL
MODALITY: ABNORMAL
MONOCYTES # BLD AUTO: 0.74 10*3/MM3 (ref 0.1–0.9)
MONOCYTES NFR BLD AUTO: 13 % (ref 5–12)
MRSA DNA SPEC QL NAA+PROBE: NORMAL
NEUTROPHILS NFR BLD AUTO: 4.36 10*3/MM3 (ref 1.7–7)
NEUTROPHILS NFR BLD AUTO: 76.6 % (ref 42.7–76)
NITRITE UR QL STRIP: NEGATIVE
NOTIFIED WHO: ABNORMAL
NOTIFIED WHO: ABNORMAL
NRBC BLD AUTO-RTO: 0 /100 WBC (ref 0–0.2)
NT-PROBNP SERPL-MCNC: 627.1 PG/ML (ref 0–900)
PCO2 BLDA: 61.3 MM HG (ref 35–45)
PCO2 BLDA: 63.4 MM HG (ref 35–45)
PH BLDA: 7.34 PH UNITS (ref 7.35–7.45)
PH BLDA: 7.37 PH UNITS (ref 7.35–7.45)
PH UR STRIP.AUTO: <=5 [PH] (ref 5–8)
PLATELET # BLD AUTO: 145 10*3/MM3 (ref 140–450)
PMV BLD AUTO: 10.3 FL (ref 6–12)
PO2 BLD: 152 MM[HG] (ref 0–500)
PO2 BLDA: 54.7 MM HG (ref 80–100)
PO2 BLDA: 66.1 MM HG (ref 80–100)
POTASSIUM BLDA-SCNC: 3.8 MMOL/L (ref 3.5–5)
POTASSIUM BLDA-SCNC: 4.2 MMOL/L (ref 3.5–5)
POTASSIUM SERPL-SCNC: 4.4 MMOL/L (ref 3.5–5.2)
PROCALCITONIN SERPL-MCNC: 0.36 NG/ML (ref 0–0.25)
PROT SERPL-MCNC: 7.2 G/DL (ref 6–8.5)
PROT UR QL STRIP: NEGATIVE
QT INTERVAL: 315 MS
QT INTERVAL: 433 MS
QTC INTERVAL: 449 MS
QTC INTERVAL: 515 MS
RBC # BLD AUTO: 4.59 10*6/MM3 (ref 3.77–5.28)
READ BACK: YES
READ BACK: YES
RHINOVIRUS RNA SPEC NAA+PROBE: NOT DETECTED
RSV RNA NPH QL NAA+NON-PROBE: NOT DETECTED
RSV RNA NPH QL NAA+NON-PROBE: NOT DETECTED
SAO2 % BLDCOA: 85.8 % (ref 95–99)
SAO2 % BLDCOA: 90.7 % (ref 95–99)
SARS-COV-2 RNA RESP QL NAA+PROBE: DETECTED
SARS-COV-2 RNA RESP QL NAA+PROBE: DETECTED
SODIUM BLD-SCNC: 140 MMOL/L (ref 131–143)
SODIUM BLD-SCNC: 141 MMOL/L (ref 131–143)
SODIUM SERPL-SCNC: 136 MMOL/L (ref 136–145)
SP GR UR STRIP: 1.03 (ref 1–1.03)
TROPONIN T NUMERIC DELTA: -3 NG/L
TROPONIN T SERPL HS-MCNC: 24 NG/L
TROPONIN T SERPL HS-MCNC: 42 NG/L
UROBILINOGEN UR QL STRIP: NORMAL
WBC NRBC COR # BLD AUTO: 5.69 10*3/MM3 (ref 3.4–10.8)
WHOLE BLOOD HOLD COAG: NORMAL
WHOLE BLOOD HOLD SPECIMEN: NORMAL

## 2024-12-07 PROCEDURE — 94640 AIRWAY INHALATION TREATMENT: CPT

## 2024-12-07 PROCEDURE — 82803 BLOOD GASES ANY COMBINATION: CPT | Performed by: INTERNAL MEDICINE

## 2024-12-07 PROCEDURE — P9612 CATHETERIZE FOR URINE SPEC: HCPCS

## 2024-12-07 PROCEDURE — 82948 REAGENT STRIP/BLOOD GLUCOSE: CPT | Performed by: EMERGENCY MEDICINE

## 2024-12-07 PROCEDURE — 71045 X-RAY EXAM CHEST 1 VIEW: CPT

## 2024-12-07 PROCEDURE — 36600 WITHDRAWAL OF ARTERIAL BLOOD: CPT

## 2024-12-07 PROCEDURE — 85379 FIBRIN DEGRADATION QUANT: CPT | Performed by: INTERNAL MEDICINE

## 2024-12-07 PROCEDURE — 94660 CPAP INITIATION&MGMT: CPT

## 2024-12-07 PROCEDURE — 25010000002 ENOXAPARIN PER 10 MG: Performed by: INTERNAL MEDICINE

## 2024-12-07 PROCEDURE — 94761 N-INVAS EAR/PLS OXIMETRY MLT: CPT

## 2024-12-07 PROCEDURE — 82948 REAGENT STRIP/BLOOD GLUCOSE: CPT

## 2024-12-07 PROCEDURE — 87637 SARSCOV2&INF A&B&RSV AMP PRB: CPT | Performed by: EMERGENCY MEDICINE

## 2024-12-07 PROCEDURE — 84145 PROCALCITONIN (PCT): CPT | Performed by: INTERNAL MEDICINE

## 2024-12-07 PROCEDURE — 85025 COMPLETE CBC W/AUTO DIFF WBC: CPT | Performed by: EMERGENCY MEDICINE

## 2024-12-07 PROCEDURE — 82728 ASSAY OF FERRITIN: CPT | Performed by: INTERNAL MEDICINE

## 2024-12-07 PROCEDURE — 0202U NFCT DS 22 TRGT SARS-COV-2: CPT | Performed by: INTERNAL MEDICINE

## 2024-12-07 PROCEDURE — 83615 LACTATE (LD) (LDH) ENZYME: CPT | Performed by: INTERNAL MEDICINE

## 2024-12-07 PROCEDURE — 94799 UNLISTED PULMONARY SVC/PX: CPT

## 2024-12-07 PROCEDURE — 36415 COLL VENOUS BLD VENIPUNCTURE: CPT

## 2024-12-07 PROCEDURE — 99285 EMERGENCY DEPT VISIT HI MDM: CPT

## 2024-12-07 PROCEDURE — 82803 BLOOD GASES ANY COMBINATION: CPT

## 2024-12-07 PROCEDURE — 83605 ASSAY OF LACTIC ACID: CPT

## 2024-12-07 PROCEDURE — 80053 COMPREHEN METABOLIC PANEL: CPT | Performed by: EMERGENCY MEDICINE

## 2024-12-07 PROCEDURE — 87040 BLOOD CULTURE FOR BACTERIA: CPT | Performed by: EMERGENCY MEDICINE

## 2024-12-07 PROCEDURE — 82330 ASSAY OF CALCIUM: CPT

## 2024-12-07 PROCEDURE — 93005 ELECTROCARDIOGRAM TRACING: CPT

## 2024-12-07 PROCEDURE — 80051 ELECTROLYTE PANEL: CPT

## 2024-12-07 PROCEDURE — 36600 WITHDRAWAL OF ARTERIAL BLOOD: CPT | Performed by: INTERNAL MEDICINE

## 2024-12-07 PROCEDURE — 83605 ASSAY OF LACTIC ACID: CPT | Performed by: EMERGENCY MEDICINE

## 2024-12-07 PROCEDURE — 25010000002 DEXAMETHASONE SODIUM PHOSPHATE 10 MG/ML SOLUTION: Performed by: EMERGENCY MEDICINE

## 2024-12-07 PROCEDURE — 83735 ASSAY OF MAGNESIUM: CPT | Performed by: EMERGENCY MEDICINE

## 2024-12-07 PROCEDURE — 83880 ASSAY OF NATRIURETIC PEPTIDE: CPT | Performed by: INTERNAL MEDICINE

## 2024-12-07 PROCEDURE — 63710000001 INSULIN LISPRO (HUMAN) PER 5 UNITS: Performed by: INTERNAL MEDICINE

## 2024-12-07 PROCEDURE — 84484 ASSAY OF TROPONIN QUANT: CPT | Performed by: EMERGENCY MEDICINE

## 2024-12-07 PROCEDURE — 86140 C-REACTIVE PROTEIN: CPT | Performed by: INTERNAL MEDICINE

## 2024-12-07 PROCEDURE — 99222 1ST HOSP IP/OBS MODERATE 55: CPT | Performed by: INTERNAL MEDICINE

## 2024-12-07 PROCEDURE — 84484 ASSAY OF TROPONIN QUANT: CPT | Performed by: INTERNAL MEDICINE

## 2024-12-07 PROCEDURE — 93005 ELECTROCARDIOGRAM TRACING: CPT | Performed by: INTERNAL MEDICINE

## 2024-12-07 PROCEDURE — 87641 MR-STAPH DNA AMP PROBE: CPT | Performed by: INTERNAL MEDICINE

## 2024-12-07 PROCEDURE — 93005 ELECTROCARDIOGRAM TRACING: CPT | Performed by: EMERGENCY MEDICINE

## 2024-12-07 PROCEDURE — 25510000001 IOPAMIDOL PER 1 ML: Performed by: EMERGENCY MEDICINE

## 2024-12-07 PROCEDURE — 71275 CT ANGIOGRAPHY CHEST: CPT

## 2024-12-07 PROCEDURE — 81003 URINALYSIS AUTO W/O SCOPE: CPT | Performed by: EMERGENCY MEDICINE

## 2024-12-07 PROCEDURE — 82948 REAGENT STRIP/BLOOD GLUCOSE: CPT | Performed by: INTERNAL MEDICINE

## 2024-12-07 RX ORDER — DEXAMETHASONE SODIUM PHOSPHATE 10 MG/ML
6 INJECTION, SOLUTION INTRAMUSCULAR; INTRAVENOUS DAILY
Status: DISCONTINUED | OUTPATIENT
Start: 2024-12-07 | End: 2024-12-10

## 2024-12-07 RX ORDER — SODIUM CHLORIDE 0.9 % (FLUSH) 0.9 %
10 SYRINGE (ML) INJECTION EVERY 12 HOURS SCHEDULED
Status: DISCONTINUED | OUTPATIENT
Start: 2024-12-07 | End: 2024-12-12 | Stop reason: HOSPADM

## 2024-12-07 RX ORDER — SODIUM CHLORIDE 0.9 % (FLUSH) 0.9 %
10 SYRINGE (ML) INJECTION AS NEEDED
Status: DISCONTINUED | OUTPATIENT
Start: 2024-12-07 | End: 2024-12-12 | Stop reason: HOSPADM

## 2024-12-07 RX ORDER — MORPHINE SULFATE 2 MG/ML
1 INJECTION, SOLUTION INTRAMUSCULAR; INTRAVENOUS ONCE
Status: COMPLETED | OUTPATIENT
Start: 2024-12-08 | End: 2024-12-08

## 2024-12-07 RX ORDER — CETIRIZINE HYDROCHLORIDE 10 MG/1
10 TABLET ORAL DAILY
Status: DISCONTINUED | OUTPATIENT
Start: 2024-12-07 | End: 2024-12-09

## 2024-12-07 RX ORDER — MORPHINE SULFATE 2 MG/ML
2 INJECTION, SOLUTION INTRAMUSCULAR; INTRAVENOUS ONCE
Status: DISCONTINUED | OUTPATIENT
Start: 2024-12-08 | End: 2024-12-07

## 2024-12-07 RX ORDER — BISACODYL 5 MG/1
5 TABLET, DELAYED RELEASE ORAL DAILY PRN
Status: DISCONTINUED | OUTPATIENT
Start: 2024-12-07 | End: 2024-12-10

## 2024-12-07 RX ORDER — PREDNISONE 10 MG/1
1 TABLET ORAL DAILY
Status: ON HOLD | COMMUNITY
Start: 2024-11-20 | End: 2024-12-07

## 2024-12-07 RX ORDER — BISACODYL 10 MG
10 SUPPOSITORY, RECTAL RECTAL DAILY PRN
Status: DISCONTINUED | OUTPATIENT
Start: 2024-12-07 | End: 2024-12-10

## 2024-12-07 RX ORDER — ENOXAPARIN SODIUM 100 MG/ML
40 INJECTION SUBCUTANEOUS DAILY
Status: DISCONTINUED | OUTPATIENT
Start: 2024-12-07 | End: 2024-12-12 | Stop reason: HOSPADM

## 2024-12-07 RX ORDER — FAMOTIDINE 20 MG/1
20 TABLET, FILM COATED ORAL DAILY
Status: DISCONTINUED | OUTPATIENT
Start: 2024-12-07 | End: 2024-12-09

## 2024-12-07 RX ORDER — AZITHROMYCIN 250 MG/1
250 TABLET, FILM COATED ORAL DAILY
Status: DISCONTINUED | OUTPATIENT
Start: 2024-12-08 | End: 2024-12-09

## 2024-12-07 RX ORDER — DEXAMETHASONE 4 MG/1
6 TABLET ORAL DAILY
Status: DISCONTINUED | OUTPATIENT
Start: 2024-12-07 | End: 2024-12-10

## 2024-12-07 RX ORDER — ARFORMOTEROL TARTRATE 15 UG/2ML
15 SOLUTION RESPIRATORY (INHALATION)
Status: DISCONTINUED | OUTPATIENT
Start: 2024-12-07 | End: 2024-12-12 | Stop reason: HOSPADM

## 2024-12-07 RX ORDER — ACETAMINOPHEN 650 MG/1
650 SUPPOSITORY RECTAL ONCE
Status: COMPLETED | OUTPATIENT
Start: 2024-12-07 | End: 2024-12-07

## 2024-12-07 RX ORDER — ALPRAZOLAM 0.25 MG/1
0.5 TABLET ORAL 2 TIMES DAILY PRN
COMMUNITY
Start: 2024-11-25

## 2024-12-07 RX ORDER — ONDANSETRON 2 MG/ML
4 INJECTION INTRAMUSCULAR; INTRAVENOUS EVERY 6 HOURS PRN
Status: DISCONTINUED | OUTPATIENT
Start: 2024-12-07 | End: 2024-12-12 | Stop reason: HOSPADM

## 2024-12-07 RX ORDER — INSULIN LISPRO 100 [IU]/ML
2-7 INJECTION, SOLUTION INTRAVENOUS; SUBCUTANEOUS
Status: DISCONTINUED | OUTPATIENT
Start: 2024-12-07 | End: 2024-12-09

## 2024-12-07 RX ORDER — AMOXICILLIN 250 MG
2 CAPSULE ORAL 2 TIMES DAILY PRN
Status: DISCONTINUED | OUTPATIENT
Start: 2024-12-07 | End: 2024-12-10

## 2024-12-07 RX ORDER — IBUPROFEN 600 MG/1
1 TABLET ORAL
Status: DISCONTINUED | OUTPATIENT
Start: 2024-12-07 | End: 2024-12-09

## 2024-12-07 RX ORDER — IPRATROPIUM BROMIDE AND ALBUTEROL SULFATE 2.5; .5 MG/3ML; MG/3ML
3 SOLUTION RESPIRATORY (INHALATION) EVERY 4 HOURS PRN
Status: DISCONTINUED | OUTPATIENT
Start: 2024-12-07 | End: 2024-12-12 | Stop reason: HOSPADM

## 2024-12-07 RX ORDER — DEXAMETHASONE SODIUM PHOSPHATE 10 MG/ML
8 INJECTION, SOLUTION INTRAMUSCULAR; INTRAVENOUS ONCE
Status: COMPLETED | OUTPATIENT
Start: 2024-12-07 | End: 2024-12-07

## 2024-12-07 RX ORDER — ACETAMINOPHEN 325 MG/1
325 TABLET ORAL EVERY 6 HOURS PRN
Status: DISCONTINUED | OUTPATIENT
Start: 2024-12-07 | End: 2024-12-10

## 2024-12-07 RX ORDER — POLYETHYLENE GLYCOL 3350 17 G/17G
17 POWDER, FOR SOLUTION ORAL DAILY PRN
Status: DISCONTINUED | OUTPATIENT
Start: 2024-12-07 | End: 2024-12-10

## 2024-12-07 RX ORDER — KETOROLAC TROMETHAMINE 30 MG/ML
30 INJECTION, SOLUTION INTRAMUSCULAR; INTRAVENOUS EVERY 6 HOURS PRN
Status: DISPENSED | OUTPATIENT
Start: 2024-12-07 | End: 2024-12-12

## 2024-12-07 RX ORDER — SODIUM CHLORIDE 9 MG/ML
40 INJECTION, SOLUTION INTRAVENOUS AS NEEDED
Status: DISCONTINUED | OUTPATIENT
Start: 2024-12-07 | End: 2024-12-12 | Stop reason: HOSPADM

## 2024-12-07 RX ORDER — NITROGLYCERIN 0.4 MG/1
0.4 TABLET SUBLINGUAL
Status: DISCONTINUED | OUTPATIENT
Start: 2024-12-07 | End: 2024-12-12 | Stop reason: HOSPADM

## 2024-12-07 RX ORDER — IPRATROPIUM BROMIDE AND ALBUTEROL SULFATE 2.5; .5 MG/3ML; MG/3ML
3 SOLUTION RESPIRATORY (INHALATION) ONCE
Status: COMPLETED | OUTPATIENT
Start: 2024-12-07 | End: 2024-12-07

## 2024-12-07 RX ORDER — ALUMINA, MAGNESIA, AND SIMETHICONE 2400; 2400; 240 MG/30ML; MG/30ML; MG/30ML
15 SUSPENSION ORAL EVERY 6 HOURS PRN
Status: DISCONTINUED | OUTPATIENT
Start: 2024-12-07 | End: 2024-12-10

## 2024-12-07 RX ORDER — AZITHROMYCIN 250 MG/1
500 TABLET, FILM COATED ORAL DAILY
Status: ACTIVE | OUTPATIENT
Start: 2024-12-07 | End: 2024-12-08

## 2024-12-07 RX ORDER — IOPAMIDOL 755 MG/ML
100 INJECTION, SOLUTION INTRAVASCULAR
Status: COMPLETED | OUTPATIENT
Start: 2024-12-07 | End: 2024-12-07

## 2024-12-07 RX ORDER — IBUPROFEN 600 MG/1
600 TABLET, FILM COATED ORAL ONCE
Status: DISCONTINUED | OUTPATIENT
Start: 2024-12-07 | End: 2024-12-07

## 2024-12-07 RX ORDER — BUDESONIDE 0.5 MG/2ML
0.5 INHALANT ORAL
Status: DISCONTINUED | OUTPATIENT
Start: 2024-12-07 | End: 2024-12-12 | Stop reason: HOSPADM

## 2024-12-07 RX ORDER — NICOTINE POLACRILEX 4 MG
15 LOZENGE BUCCAL
Status: DISCONTINUED | OUTPATIENT
Start: 2024-12-07 | End: 2024-12-09

## 2024-12-07 RX ORDER — DEXTROSE MONOHYDRATE 25 G/50ML
25 INJECTION, SOLUTION INTRAVENOUS
Status: DISCONTINUED | OUTPATIENT
Start: 2024-12-07 | End: 2024-12-09

## 2024-12-07 RX ORDER — ACETAMINOPHEN 500 MG
1000 TABLET ORAL ONCE
Status: DISCONTINUED | OUTPATIENT
Start: 2024-12-07 | End: 2024-12-07

## 2024-12-07 RX ORDER — CALCIUM CARBONATE 500 MG/1
2 TABLET, CHEWABLE ORAL 2 TIMES DAILY PRN
Status: DISCONTINUED | OUTPATIENT
Start: 2024-12-07 | End: 2024-12-10

## 2024-12-07 RX ADMIN — ARFORMOTEROL TARTRATE 15 MCG: 15 SOLUTION RESPIRATORY (INHALATION) at 21:33

## 2024-12-07 RX ADMIN — DEXAMETHASONE SODIUM PHOSPHATE 8 MG: 10 INJECTION INTRAMUSCULAR; INTRAVENOUS at 08:58

## 2024-12-07 RX ADMIN — ACETAMINOPHEN 650 MG: 650 SUPPOSITORY RECTAL at 09:48

## 2024-12-07 RX ADMIN — BUDESONIDE 0.5 MG: 0.5 INHALANT RESPIRATORY (INHALATION) at 21:33

## 2024-12-07 RX ADMIN — BUDESONIDE 0.5 MG: 0.5 INHALANT RESPIRATORY (INHALATION) at 14:15

## 2024-12-07 RX ADMIN — IPRATROPIUM BROMIDE AND ALBUTEROL SULFATE 3 ML: .5; 3 SOLUTION RESPIRATORY (INHALATION) at 08:23

## 2024-12-07 RX ADMIN — IOPAMIDOL 100 ML: 755 INJECTION, SOLUTION INTRAVENOUS at 09:20

## 2024-12-07 RX ADMIN — ENOXAPARIN SODIUM 40 MG: 100 INJECTION SUBCUTANEOUS at 18:44

## 2024-12-07 RX ADMIN — INSULIN LISPRO 2 UNITS: 100 INJECTION, SOLUTION INTRAVENOUS; SUBCUTANEOUS at 22:27

## 2024-12-07 RX ADMIN — Medication 10 ML: at 22:25

## 2024-12-07 RX ADMIN — Medication 10 ML: at 14:14

## 2024-12-07 RX ADMIN — INSULIN LISPRO 3 UNITS: 100 INJECTION, SOLUTION INTRAVENOUS; SUBCUTANEOUS at 18:47

## 2024-12-07 RX ADMIN — ARFORMOTEROL TARTRATE 15 MCG: 15 SOLUTION RESPIRATORY (INHALATION) at 14:15

## 2024-12-07 NOTE — PAYOR COMM NOTE
"Renée Llanes (59 y.o. Female)       Date of Birth   1965    Social Security Number       Address   [address] [place] [id number]    Home Phone   [phone]    MRN   [id number]       Gnosticist   None    Marital Status   Legally                             Admission Date   12/7/24    Admission Type   Emergency    Admitting Provider   Isaias Kay MD    Attending Provider   Isaias Kay MD    Department, Room/Bed   [facility] 4TH FLOOR MEDICAL TELEMETRY UNIT, 422/1       Discharge Date       Discharge Disposition       Discharge Destination                                 Attending Provider: Isaias Kay MD    Allergies: Amoxicillin, Ceclor [Cefaclor], Penicillins, Sulfa Antibiotics, Valium [Diazepam], Ambien [Zolpidem], Aspirin, Ativan [Lorazepam], Benadryl [Diphenhydramine], Biaxin [Clarithromycin], Cephalexin, Clindamycin, Compazine [Prochlorperazine Edisylate], Contrast Dye (Echo Or Unknown Ct/mr), Doxycycline, Nsaids, Phenergan [Promethazine Hcl], Promethazine, Vancomycin    Isolation: Contact Air   Infection: COVID (confirmed) (12/07/24)   Code Status: CPR    Ht: 167.6 cm (66\")   Wt: 64 kg (141 lb 1.5 oz)    Admission Cmt: None   Principal Problem: COVID-19 virus infection [U07.1]                   Active Insurance as of 12/7/2024       Primary Coverage       Payor Plan Insurance Group Employer/Plan Group    [facility] [id number]       Payor Plan Address Payor Plan Phone Number Payor Plan Fax Number Effective Dates    [address] [address]   1/1/2021 - None Entered    [facility] [id number]         Subscriber Name Subscriber Birth Date Member ID       RENÉE LLANES 1965 [id number]                     Emergency Contacts        (Rel.) Home Phone Work Phone Mobile Phone    Sheila Watson (Daughter) [phone] -- [phone]                 History & Physical        Isaias Kay MD at " 24 1539           [facility]   HOSPITALIST HISTORY AND PHYSICAL  Date: 2024   Patient Name: Isha Llanes  : 1965  MRN: [id number]  Primary Care Physician:  Virgil Salas MD  Date of admission: 2024    Subjective  Subjective     Chief Complaint: Shortness of air and increased lethargy started yesterday    HPI:    Isha Llanes is a 59 y.o. 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 Mancuso and Dr. Zelaya, Hodgkin lymphoma, Dilaudid pain pump for cancer, lupus on chronic prednisone, anxiety disorder, CAD, neuropathy, diabetes mellitus, hypertension, aortic stenosis s/p TAVR, aspiration, chronic hypoxemia as needed home oxygen use, chronic hoarseness and bedridden with contractures of lower extremities  Patient presented with increasing shortness of air and lethargy to [facility] ED.  History is obtained by talking to ED physician to be in the record and daughter at bedside as patient is lethargic.  Patient does wake up to vocal response but goes back to sleep her voice is very hoarse and difficult to comprehend, this is chronic per daughter.  Patient has been having fever along with cough.  Also has mild diarrhea.  Patient has not been vaccinated for COVID.  Workup in the ED showed temperature of 103 heart rate of 132 with soft blood pressure.  91% saturation on 4 L.  ABG showed pH of 7.36, pCO2 of 61, pO2 of 54, O2 sat 86% on 4 L.  CMP is negative.  Lactate is negative.  CBC is negative.  UA is negative.  EKG shows sinus tachycardia 122.  CTA of the chest does not show any PE does have right hilar lymphadenopathy reactive versus neoplastic, narrowing of larynx with edema and thickening similar to previous PET CT from .  Also has new 7 mm right lower lobe pulm nodule.  COVID PCR is positive.  Hospitalist has been called to admit her for further management.      Personal History      Past Medical History:  Past Medical History:   Diagnosis Date    Anesthesia     REPORTS HAS GOT REAL EMOTIONAL AND HAS BECOME ANGRY BEFORE    Anxiety     Aortic stenosis, severe     HAS BIO VALVE REPLACED    Arthritis     Asthma     Back pain     Blood clotting disorder     Cancer     Cancer associated pain     HODGKINS LYMPHOMA    CHF (congestive heart failure)     Chronic low back pain with sciatica     Chronic pain disorder     COPD (chronic obstructive pulmonary disease)     Coronary artery disease     Diabetes mellitus     TYPE 2    Diabetes mellitus     Dyspnea on effort     GERD (gastroesophageal reflux disease)     H/O lumpectomy     H/O: hysterectomy     Hiatal hernia     History of degenerative disc disease     History of kidney stones     History of pulmonary embolus (PE)     History of transfusion     AS A CHILD NO TRANSFUSION REACTION    History of transfusion     Hodgkin's lymphoma     5/19/23  5 YEARS SINCE LAST CHEMO TX    Hypertension     Immobility     Liver disease     DENIES ANY CURRENT ISSUES    Lupus     Mitral valve prolapse     Nausea vomiting and diarrhea 3/7/2024    Panic disorder     Pelvic pain     Peripheral neuropathy     Personal history of DVT (deep vein thrombosis)     Presence of intrathecal pump     PTSD (post-traumatic stress disorder)     Sacroiliac joint disease     SI (sacroiliac) joint inflammation     Sleep apnea     Venous insufficiency        Past Surgical History:  Past Surgical History:   Procedure Laterality Date    ABDOMINAL SURGERY      BACK SURGERY      SPINAL FUSION     BACK SURGERY      BLADDER REPAIR      CARDIAC CATHETERIZATION N/A 03/06/2019    Procedure: Valvuloplasty;  Surgeon: Wayne Johnson MD;  Location: [facility] CATH INVASIVE LOCATION;  Service: Cardiology    CARDIAC CATHETERIZATION      CARDIAC CATHETERIZATION Left 5/31/2023    Procedure: Cardiac Catheterization/Vascular Study;  Surgeon: Poncho Reid MD;  Location: [facility] CATH INVASIVE  LOCATION;  Service: Cardiovascular;  Laterality: Left;    CARDIAC SURGERY      CARDIAC VALVE REPLACEMENT  2021    CHOLECYSTECTOMY      COLONOSCOPY N/A 12/29/2021    Procedure: COLONOSCOPY;  Surgeon: Karine Orlando MD;  Location: [facility] ENDOSCOPY;  Service: Gastroenterology;  Laterality: N/A;  POOR PREP    COLONOSCOPY N/A 04/13/2022    Procedure: COLONOSCOPY WITH POLYPECTOMY;  Surgeon: Karine Orlando MD;  Location: [facility] ENDOSCOPY;  Service: Gastroenterology;  Laterality: N/A;  COLON POLYP, HEMORRHOIDS, POOR PREP    COLONOSCOPY      DIRECT LARYNGOSCOPY, ESOPHAGOSCOPY, BRONCHOSCOPY N/A 5/22/2023    Procedure: DIRECT LARYNGOSCOPY WITH BIOPSIES , ESOPHAGOSCOPY, BRONCHOSCOPY;  Surgeon: Ronnie Mcgarry MD;  Location: [facility] OR OSC;  Service: ENT;  Laterality: N/A;    ENDOSCOPY N/A 12/29/2021    Procedure: ESOPHAGOGASTRODUODENOSCOPY;  Surgeon: Karine Orlando MD;  Location: [facility] ENDOSCOPY;  Service: Gastroenterology;  Laterality: N/A;  ESOPHAGITIS, GASTRITIS, HIATAL HERNIA    ENDOSCOPY      ENDOSCOPY N/A 4/16/2024    Procedure: ESOPHAGOGASTRODUODENOSCOPY WITH BIOPSIES;  Surgeon: Karine Orlando MD;  Location: [facility] ENDOSCOPY;  Service: Gastroenterology;  Laterality: N/A;  ESOPHAGITIS, HIATAL HERNIA    HYSTERECTOMY      LYMPHADENECTOMY      PAIN PUMP INSERTION/REVISION N/A 10/18/2019    Procedure: PAIN PUMP INSERTION hospitals [place] [place];  Surgeon: Horace Rios MD;  Location: [facility] OR;  Service: Pain Management    PAIN PUMP INSERTION/REVISION N/A 11/23/2020    Procedure: pain pump removal;  Surgeon: Horace Rios MD;  Location: [facility] OR;  Service: Pain Management;  Laterality: N/A;    PEG TUBE INSERTION N/A 7/26/2023    Procedure: PERCUTANEOUS ENDOSCOPIC GASTROSTOMY TUBE INSERTION;  Surgeon: Kody Mercer MD;  Location: [facility] ENDOSCOPY;  Service: General;  Laterality: N/A;  SUCCESSFUL PEG TUBE PLACE PLACEMENT    PORTACATH PLACEMENT      IN LEFT CHEST  REPORTS THAT IT IS NOT FUNCTIONING    SKIN BIOPSY      TONSILLECTOMY      TUBAL ABDOMINAL LIGATION      TUMOR REMOVAL         Family History:   family history includes ADD / ADHD in her brother, granddaughter, grandson, and nephew; Anxiety disorder in her mother; Bipolar disorder in her sister; Depression in her sister; Heart failure in her mother; Pancreatic cancer in her paternal grandmother and sister; Prostate cancer in her father; Thyroid cancer in her maternal grandmother.    Social History:    reports that she has been smoking cigarettes. She started smoking about 45 years ago. She has a 68.9 pack-year smoking history. She has been exposed to tobacco smoke. She has never used smokeless tobacco. She reports that she does not drink alcohol and does not use drugs.    Home Medications:  ALPRAZolam, acetaminophen, albuterol sulfate HFA, cetirizine, clopidogrel, mupirocin, pain, and predniSONE    Allergies:  Allergies   Allergen Reactions    Amoxicillin Shortness Of Breath     Has tolerated Cefazolin, Ceftriaxone, and Cefepime -Jermaine Melida, RPH    Ceclor [Cefaclor] Shortness Of Breath     Has tolerated Cefazolin, Ceftriaxone, and Cefepime -[facility]      Penicillins Shortness Of Breath     Has tolerated Cefazolin, Ceftriaxone, and [facility]    Sulfa Antibiotics Rash    Valium [Diazepam] Mental Status Change     DEPRESSED    Ambien [Zolpidem] Mental Status Change    Aspirin GI Intolerance    Ativan [Lorazepam] Mental Status Change    Benadryl [Diphenhydramine] Anxiety     AND MAKES HER HYPER    Biaxin [Clarithromycin] Unknown - Low Severity    Cephalexin Unknown - Low Severity    Clindamycin Unknown - Low Severity    Compazine [Prochlorperazine Edisylate] Rash    Contrast Dye (Echo Or Unknown Ct/Mr) Other (See Comments)     Caused pain in her arm    Doxycycline Rash    Nsaids GI Intolerance    Phenergan [Promethazine Hcl] GI Intolerance    Promethazine GI Intolerance     Vancomycin Itching       Review of Systems   All systems were reviewed and negative except for: As per H&P Limited as patient lethargic and very hoarse    Objective  Objective     Vitals:   Temp:  [98.5 °F (36.9 °C)-103.7 °F (39.8 °C)] 98.5 °F (36.9 °C)  Heart Rate:  [] 114  Resp:  [17-30] 17  BP: ()/(61-94) 109/69  Flow (L/min) (Oxygen Therapy):  [4] 4    Physical Exam    Constitutional: Lethargic using abdominal and accessory muscles   eyes: Pupils equal, sclerae anicteric, no conjunctival injection   HENT: NCAT, mucous membranes dry   Neck: Supple, no thyromegaly, no lymphadenopathy, trachea midline   Respiratory: Occasional wheezing and decreased to auscultation bilaterally, positive labored respirations    Cardiovascular: Left upper chest port with a healed scar, tachycardic, 2/6 systolic murmurs, rubs, or gallops, palpable pedal pulses bilaterally   Gastrointestinal: Positive bowel sounds, soft, nontender, nondistended   Musculoskeletal: Chronic +1-2 bilateral ankle edema, no clubbing or cyanosis to extremities   Psychiatric: Not appropriate affect, cooperative   Neurologic: Oriented x 1 knows in hospital,  lower extremity contractures not able to straighten her legs, Cranial Nerves grossly intact to confrontation, speech not clear   Skin: Chronic stasis changes bilateral lower extremities    Result Review   Result Review:  I have personally reviewed the results from the time of this admission to 12/7/2024 15:39 EST and agree with these findings:  [x]  Laboratory  [x]  Microbiology  [x]  Radiology  [x]  EKG/Telemetry sinus tachycardia 122, IVCD, QT 0.44  []  Cardiology/Vascular troponin 42  []  Pathology  [x]  Old records  [x]  Other: Medications      Assessment & Plan  Assessment / Plan     Assessment:  Acute on chronic hypoxemic respiratory failure patient needing NIPPV due to use of accessory and abdominal muscles  COVID-19 pneumonia.  COPD exacerbation.  Chronic hoarseness, dysphagia and  aspiration.  History of supraglottic squamous cell cancer of larynx status post XRT.  Chronic narrowing of larynx with edema and thickening  Bedridden and chronic contractures of lower extremities.  Sepsis as patient has fever, tachycardia and hypoxemia.  Right hilar lymphadenopathy reactive versus neoplastic.  New 7 mm right lower lobe pulmonary nodule.  Need repeat CT chest in 3 months.  Aortic stenosis status post TAVR.  Diabetes mellitus.  Neuropathy.  Lupus on chronic prednisone.  Chronic pain from cancer on Dilaudid pump.  History of Hodgkin's lymphoma.  Hypertension.  Anxiety disorder.    Plan:   Continue supplemental oxygen keep sats more than 90%.  NIPPV as needed.  Repeat ABG noted mild hypercapnia.  Decadron.  Remdesivir.  Zithromax.  Respiratory culture.  Legionella and strep pneumonia antigen.  MRSA PCR  DuoNeb, Pulmicort brovana neb.  Bronchodilator and bronchopulmonary hygiene protocol.  If patient remains lethargic will get CT of the chest.  Patient does follow commands appropriately.  Incentive spirometry and flutter valve.  Speech therapy evaluation.  Nectar thick puréed diet.  Sliding-scale insulin.  Check procalcitonin and BNP.  PT OT.  Resume appropriate home medications.  Telemetry  Discussed with the ED physician nursing staff.  Discussed with daughter at bedside.  Patient is full code             VTE Prophylaxis:  Pharmacologic & mechanical VTE prophylaxis orders are present.  Lovenox    CODE STATUS:    Code Status (Patient has no pulse and is not breathing): CPR (Attempt to Resuscitate)  Medical Interventions (Patient has pulse or is breathing): Full Support      Admission Status:  I believe this patient meets inpatient status.    Part of this note may be an electronic transcription/translation of spoken language to printed text using the Dragon Dictation System.     Electronically signed by Isaias Kay MD, 12/07/24, 3:39 PM EST.             Electronically signed by Isaias Kay MD  at 12/07/24 1550          Emergency Department Notes        Sebastian Gunn MD at 12/07/24 1349          Time: 1:49 PM EST  Date of encounter:  12/7/2024  Independent Historian/Clinical History and Information was obtained by:   Patient    History is limited by: Altered Mental Status    Chief Complaint: Shortness of breath      History of Present Illness:  Patient is a 59 y.o. year old female who presents to the emergency department for evaluation of shortness of breath.  Patient has multiple other medical problems but patient's family at bedside reports that the whole house has had COVID-19 recently.  They report significant change in patient in the last 24 hours.      Patient Care Team  Primary Care Provider: Virgil Salas MD    Past Medical History:     Allergies   Allergen Reactions    Amoxicillin Shortness Of Breath     Has tolerated Cefazolin, Ceftriaxone, and Cefepime -Jermaine Melida, RPH    Ceclor [Cefaclor] Shortness Of Breath     Has tolerated Cefazolin, Ceftriaxone, and Cefepime -Jermaine Melida, RPH      Penicillins Shortness Of Breath     Has tolerated Cefazolin, Ceftriaxone, and Cefepime -Jermaine Melida, RPH    Sulfa Antibiotics Rash    Valium [Diazepam] Mental Status Change     DEPRESSED    Ambien [Zolpidem] Mental Status Change    Aspirin GI Intolerance    Ativan [Lorazepam] Mental Status Change    Benadryl [Diphenhydramine] Anxiety     AND MAKES HER HYPER    Biaxin [Clarithromycin] Unknown - Low Severity    Cephalexin Unknown - Low Severity    Clindamycin Unknown - Low Severity    Compazine [Prochlorperazine Edisylate] Rash    Contrast Dye (Echo Or Unknown Ct/Mr) Other (See Comments)     Caused pain in her arm    Doxycycline Rash    Nsaids GI Intolerance    Phenergan [Promethazine Hcl] GI Intolerance    Promethazine GI Intolerance    Vancomycin Itching     Past Medical History:   Diagnosis Date    Anesthesia     REPORTS HAS GOT REAL EMOTIONAL AND HAS BECOME ANGRY BEFORE    Anxiety      Aortic stenosis, severe     HAS BIO VALVE REPLACED    Arthritis     Asthma     Back pain     Blood clotting disorder     Cancer     Cancer associated pain     HODGKINS LYMPHOMA    CHF (congestive heart failure)     Chronic low back pain with sciatica     Chronic pain disorder     COPD (chronic obstructive pulmonary disease)     Coronary artery disease     Diabetes mellitus     TYPE 2    Diabetes mellitus     Dyspnea on effort     GERD (gastroesophageal reflux disease)     H/O lumpectomy     H/O: hysterectomy     Hiatal hernia     History of degenerative disc disease     History of kidney stones     History of pulmonary embolus (PE)     History of transfusion     AS A CHILD NO TRANSFUSION REACTION    History of transfusion     Hodgkin's lymphoma     5/19/23  5 YEARS SINCE LAST CHEMO TX    Hypertension     Immobility     Liver disease     DENIES ANY CURRENT ISSUES    Lupus     Mitral valve prolapse     Nausea vomiting and diarrhea 3/7/2024    Panic disorder     Pelvic pain     Peripheral neuropathy     Personal history of DVT (deep vein thrombosis)     Presence of intrathecal pump     PTSD (post-traumatic stress disorder)     Sacroiliac joint disease     SI (sacroiliac) joint inflammation     Sleep apnea     Venous insufficiency      Past Surgical History:   Procedure Laterality Date    ABDOMINAL SURGERY      BACK SURGERY      SPINAL FUSION     BACK SURGERY      BLADDER REPAIR      CARDIAC CATHETERIZATION N/A 03/06/2019    Procedure: Valvuloplasty;  Surgeon: Wayne Johnson MD;  Location: [facility] CATH INVASIVE LOCATION;  Service: Cardiology    CARDIAC CATHETERIZATION      CARDIAC CATHETERIZATION Left 5/31/2023    Procedure: Cardiac Catheterization/Vascular Study;  Surgeon: Poncho Reid MD;  Location: [facility] CATH INVASIVE LOCATION;  Service: Cardiovascular;  Laterality: Left;    CARDIAC SURGERY      CARDIAC VALVE REPLACEMENT  2021    CHOLECYSTECTOMY      COLONOSCOPY N/A 12/29/2021    Procedure:  COLONOSCOPY;  Surgeon: Karine Orlando MD;  Location: [facility] ENDOSCOPY;  Service: Gastroenterology;  Laterality: N/A;  POOR PREP    COLONOSCOPY N/A 04/13/2022    Procedure: COLONOSCOPY WITH POLYPECTOMY;  Surgeon: Karine Orlando MD;  Location: [facility] ENDOSCOPY;  Service: Gastroenterology;  Laterality: N/A;  COLON POLYP, HEMORRHOIDS, POOR PREP    COLONOSCOPY      DIRECT LARYNGOSCOPY, ESOPHAGOSCOPY, BRONCHOSCOPY N/A 5/22/2023    Procedure: DIRECT LARYNGOSCOPY WITH BIOPSIES , ESOPHAGOSCOPY, BRONCHOSCOPY;  Surgeon: Ronnie Mcgarry MD;  Location: [facility] OR OSC;  Service: ENT;  Laterality: N/A;    ENDOSCOPY N/A 12/29/2021    Procedure: ESOPHAGOGASTRODUODENOSCOPY;  Surgeon: Karine Orlando MD;  Location: [facility] ENDOSCOPY;  Service: Gastroenterology;  Laterality: N/A;  ESOPHAGITIS, GASTRITIS, HIATAL HERNIA    ENDOSCOPY      ENDOSCOPY N/A 4/16/2024    Procedure: ESOPHAGOGASTRODUODENOSCOPY WITH BIOPSIES;  Surgeon: Karine Orlando MD;  Location: [facility] ENDOSCOPY;  Service: Gastroenterology;  Laterality: N/A;  ESOPHAGITIS, HIATAL HERNIA    HYSTERECTOMY      LYMPHADENECTOMY      PAIN PUMP INSERTION/REVISION N/A 10/18/2019    Procedure: PAIN PUMP INSERTION [facility] 10-18-19 [place];  Surgeon: Horace Rios MD;  Location: [facility];  Service: Pain Management    PAIN PUMP INSERTION/REVISION N/A 11/23/2020    Procedure: pain pump removal;  Surgeon: Horace Rios MD;  Location: [facility];  Service: Pain Management;  Laterality: N/A;    PEG TUBE INSERTION N/A 7/26/2023    Procedure: PERCUTANEOUS ENDOSCOPIC GASTROSTOMY TUBE INSERTION;  Surgeon: Kody Mercer MD;  Location: [facility] ENDOSCOPY;  Service: General;  Laterality: N/A;  SUCCESSFUL PEG TUBE PLACE PLACEMENT    PORTACATH PLACEMENT      IN LEFT CHEST REPORTS THAT IT IS NOT FUNCTIONING    SKIN BIOPSY      TONSILLECTOMY      TUBAL ABDOMINAL LIGATION      TUMOR REMOVAL       Family History   Problem Relation Age of Onset     Heart failure Mother     Anxiety disorder Mother     Prostate cancer Father     Depression Sister     Bipolar disorder Sister     Pancreatic cancer Sister     ADD / ADHD Brother     Thyroid cancer Maternal Grandmother     Pancreatic cancer Paternal Grandmother     ADD / ADHD Grandson     ADD / ADHD Granddaughter     ADD / ADHD Nephew     Malig Hyperthermia Neg Hx        Home Medications:  Prior to Admission medications    Medication Sig Start Date End Date Taking? Authorizing Provider   ALPRAZolam (XANAX) 0.25 MG tablet Take 2 tablets by mouth 2 (Two) Times a Day As Needed. 11/25/24  Yes Nette Jiménez MD   predniSONE (DELTASONE) 10 MG tablet Take 1 tablet by mouth Daily. 11/20/24 12/7/24 Yes Nette Jiménez MD   acetaminophen (Tylenol) 325 MG tablet Take 1 tablet by mouth Every 6 (Six) Hours As Needed for Mild Pain, Headache or Fever.    Provider, MD Nette   albuterol sulfate  (90 Base) MCG/ACT inhaler Inhale 2 puffs Every 4 (Four) Hours As Needed for Wheezing. 11/20/23   Katia Wright MD   cetirizine (zyrTEC) 10 MG tablet Take 1 tablet by mouth Daily.    ProviderNette MD   clopidogrel (PLAVIX) 75 MG tablet Take 1 tablet by mouth Daily. 8/7/24   Katia Wright MD   mupirocin (BACTROBAN) 2 % ointment Apply 1 Application topically to the appropriate area as directed 3 (Three) Times a Day. 4/10/24   Katia Wright MD   pain patient supplied pump by Intrathecal route Continuous. Type of Medication: Hydromorphone 15 mg/ml and Bupivacaine 0.5 mg/ml  [facility] hospital [phone]  Provider:Dr. Boudreaux  Provider number: [phone]  Basal Dose: Hydromorphone 7.518 mg/day Bupivacaine 0.67224 mg/day  Pump capacity: 40ml  ( MAX of Hydromorphone 9.456 mg/ day; Bupivacaine 0.10924 mg /day)  Bolus Dose:Hydromorphone 0.500 mg, Bupivacaine 0.57089 mg  Max activations: 4 per day  Lockout 3 hours. 1 Bolus per every 3 hours   Next refill-  every 60 days 11/13/24   Alarm date- 11/17/24  Pump  "manufacture:[employer]onic    Manny Boudreaux MD   predniSONE (DELTASONE) 20 MG tablet Take 2 tablets by mouth Daily. 11/11/24   Provider, MD Nette        Social History:   Social History     Tobacco Use    Smoking status: Every Day     Current packs/day: 1.50     Average packs/day: 1.5 packs/day for 45.9 years (68.9 ttl pk-yrs)     Types: Cigarettes     Start date: 1979     Passive exposure: Current    Smokeless tobacco: Never   Vaping Use    Vaping status: Never Used   Substance Use Topics    Alcohol use: Never    Drug use: Never         Review of Systems:  Review of Systems   Unable to perform ROS: Mental status change        Physical Exam:  /69   Pulse 114   Temp 98.5 °F (36.9 °C) (Rectal)   Resp 17   Ht 167.6 cm (66\")   Wt 64 kg (141 lb 1.5 oz)   LMP  (LMP Unknown)   SpO2 97%   BMI 22.77 kg/m²     Physical Exam  Vitals and nursing note reviewed.   Constitutional:       Appearance: Normal appearance. She is ill-appearing. She is not toxic-appearing.   HENT:      Head: Normocephalic and atraumatic.      Jaw: There is normal jaw occlusion.   Eyes:      General: Lids are normal.      Extraocular Movements: Extraocular movements intact.      Conjunctiva/sclera: Conjunctivae normal.      Pupils: Pupils are equal, round, and reactive to light.   Cardiovascular:      Rate and Rhythm: Regular rhythm. Tachycardia present.      Pulses: Normal pulses.      Heart sounds: Normal heart sounds.   Pulmonary:      Effort: Pulmonary effort is normal. Tachypnea present. No respiratory distress.      Breath sounds: Wheezing present. No rhonchi.   Abdominal:      General: Abdomen is flat.      Palpations: Abdomen is soft.      Tenderness: There is no abdominal tenderness. There is no guarding or rebound.   Musculoskeletal:         General: Normal range of motion.      Cervical back: Normal range of motion and neck supple.      Right lower leg: No edema.      Left lower leg: No edema.   Skin:     General: Skin is warm " and dry.   Neurological:      Mental Status: She is alert. Mental status is at baseline.      Comments: Confused, listless                  Procedures:  Procedures      Medical Decision Making:      Comorbidities that affect care:    Pulmonary embolism, diabetes, COPD, CHF, CKD, status post TAVR    External Notes reviewed:    None      The following orders were placed and all results were independently analyzed by me:  Orders Placed This Encounter   Procedures    Blood Culture - Blood,    Blood Culture - Blood,    COVID-19, FLU A/B, RSV PCR 1 HR TAT - Swab, Nasopharynx    Respiratory Culture - Sputum, Cough    Legionella Antigen, Urine - Urine, Urine, Clean Catch    Respiratory Panel PCR w/COVID-19(SARS-CoV-2) TANIKA/MARY/MINNIE/PAD/COR/ELLEN In-House, NP Swab in UTM/VTM, 2 HR TAT - Swab, Nasopharynx    S. Pneumo Ag Urine or CSF - Urine, Urine, Clean Catch    XR Chest 1 View    CT Angiogram Chest Pulmonary Embolism    [place] Draw    Single High Sensitivity Troponin T    Magnesium    Urinalysis With Microscopic If Indicated (No Culture) - Urine, Clean Catch    Comprehensive Metabolic Panel    Lactic Acid, Plasma    CBC Auto Differential    Blood Gas, Arterial -    Ferritin    Lactate Dehydrogenase    D-dimer, Quantitative    Procalcitonin    Comprehensive Metabolic Panel    C-reactive Protein    BNP    Blood Gas, Arterial -    Diet: Diabetic; Consistent Carbohydrate; Fluid Consistency: Thin (IDDSI 0)    Undress & Gown    Vital Signs    Orthostatic Blood Pressure    Undress & Gown    Vital Signs    Advance Diet As Tolerated -    Oral Care    Place Sequential Compression Device    Maintain Sequential Compression Device    Maintain IV Access    Telemetry - Place Orders & Notify Provider of Results When Patient Experiences Acute Chest Pain, Dysrhythmia or Respiratory Distress    Vital Signs    Activity - Ad Shena    Up in Chair    Ambulate Patient    Intake & Output    Daily Weights    Intake & Output    Continuous Pulse Oximetry     Code Status and Medical Interventions: CPR (Attempt to Resuscitate); Full Support    Inpatient Hospitalist Consult    Inpatient Case Management  Consult    Patient Isolation Contact and Airborne    Oxygen Therapy- Nasal Cannula; Titrate 1-6 LPM Per SpO2; 90 - 95%    Oxygen Therapy- Nasal Cannula; Titrate 1-6 LPM Per SpO2; 90 - 95%    Document Pulse Oximetry - On Room Air / Home O2 Level    Oxygen Therapy- Nasal Cannula; Titrate 1-6 LPM Per SpO2; 90 - 95%    Incentive Spirometry    Oscillating Positive Expiratory Pressure (OPEP)    RT to Initiate Bronchodilator Protocol    RT to Initiate Bronchopulmonary Hygiene Protocol    Oxygen Therapy- Nasal Cannula; Titrate 1-6 LPM Per SpO2; 90 - 95%    NIPPV - Provider Settings    POC Glucose Once    POC Lactate    POC Electrolyte Panel    POC Glucose Once    POC Glucose 4x Daily Before Meals & at Bedtime    POC Glucose Once    POC Lactate    POC Electrolyte Panel    ECG 12 Lead ED Triage Standing Order; Weak / Dizzy / AMS    Insert Peripheral IV    Insert Peripheral IV    Insert Peripheral IV    Access Port    Inpatient Admission    Fall Precautions    Green Top (Gel)    Lavender Top    Gold Top - SST    Light Blue Top    CBC & Differential       Medications Given in the Emergency Department:  Medications   sodium chloride 0.9 % flush 10 mL (has no administration in time range)   sodium chloride 0.9 % flush 10 mL (has no administration in time range)   acetaminophen (TYLENOL) tablet 325 mg (has no administration in time range)   cetirizine (zyrTEC) tablet 10 mg (10 mg Oral Not Given 12/7/24 1413)   Patient Supplied Pain Pump (has no administration in time range)   sodium chloride 0.9 % flush 10 mL (10 mL Intravenous Given 12/7/24 1414)   sodium chloride 0.9 % flush 10 mL (has no administration in time range)   sodium chloride 0.9 % infusion 40 mL (has no administration in time range)   calcium carbonate (TUMS) chewable tablet 500 mg (200 mg elemental) (has no  administration in time range)   aluminum-magnesium hydroxide-simethicone (MAALOX MAX) 400-400-40 MG/5ML suspension 15 mL (has no administration in time range)   famotidine (PEPCID) tablet 20 mg (20 mg Oral Not Given 12/7/24 1414)   ondansetron (ZOFRAN) injection 4 mg (has no administration in time range)   Enoxaparin Sodium (LOVENOX) syringe 40 mg (has no administration in time range)   nitroglycerin (NITROSTAT) SL tablet 0.4 mg (has no administration in time range)   Potassium Replacement - Follow Nurse / BPA Driven Protocol (has no administration in time range)   Magnesium Standard Dose Replacement - Follow Nurse / BPA Driven Protocol (has no administration in time range)   Phosphorus Replacement - Follow Nurse / BPA Driven Protocol (has no administration in time range)   Calcium Replacement - Follow Nurse / BPA Driven Protocol (has no administration in time range)   ketorolac (TORADOL) injection 30 mg (has no administration in time range)   sennosides-docusate (PERICOLACE) 8.6-50 MG per tablet 2 tablet (has no administration in time range)     And   polyethylene glycol (MIRALAX) packet 17 g (has no administration in time range)     And   bisacodyl (DULCOLAX) EC tablet 5 mg (has no administration in time range)     And   bisacodyl (DULCOLAX) suppository 10 mg (has no administration in time range)   dexAMETHasone (DECADRON) tablet 6 mg (6 mg Oral Not Given 12/7/24 1413)     Or   dexAMETHasone sodium phosphate injection 6 mg ( Intravenous Not Given:  See Alt 12/7/24 1413)   Pharmacy Consult - Remdesivir for Severe COVID-19 (Within 7 days of symptom onset) (has no administration in time range)   budesonide (PULMICORT) nebulizer solution 0.5 mg (0.5 mg Nebulization Given 12/7/24 1415)   arformoterol (BROVANA) nebulizer solution 15 mcg (15 mcg Nebulization Given 12/7/24 1415)   ipratropium-albuterol (DUO-NEB) nebulizer solution 3 mL (has no administration in time range)   dextrose (GLUTOSE) oral gel 15 g (has no  administration in time range)   dextrose (D50W) (25 g/50 mL) IV injection 25 g (has no administration in time range)   glucagon (GLUCAGEN) injection 1 mg (has no administration in time range)   Insulin Lispro (humaLOG) injection 2-7 Units (has no administration in time range)   azithromycin (ZITHROMAX) tablet 500 mg (500 mg Oral Not Given 12/7/24 1413)     Followed by   azithromycin (ZITHROMAX) tablet 250 mg (has no administration in time range)   remdesivir 200 mg in 290 mL NS (has no administration in time range)   remdesivir 100 mg in 270 mL NS (has no administration in time range)   dexAMETHasone sodium phosphate injection 8 mg (8 mg Intravenous Given 12/7/24 0858)   ipratropium-albuterol (DUO-NEB) nebulizer solution 3 mL (3 mL Nebulization Given 12/7/24 0823)   acetaminophen (TYLENOL) suppository 650 mg (650 mg Rectal Given 12/7/24 0948)   iopamidol (ISOVUE-370) 76 % injection 100 mL (100 mL Intravenous Given 12/7/24 0920)        ED Course:         Labs:    Lab Results (last 24 hours)       Procedure Component Value Units Date/Time    Single High Sensitivity Troponin T [[id number]]  (Abnormal) Collected: 12/07/24 0757    Specimen: Blood Updated: 12/07/24 0848     HS Troponin T 42 ng/L     Narrative:      High Sensitive Troponin T Reference Range:  <14.0 ng/L- Negative Female for AMI  <22.0 ng/L- Negative Male for AMI  >=14 - Abnormal Female indicating possible myocardial injury.  >=22 - Abnormal Male indicating possible myocardial injury.   Clinicians would have to utilize clinical acumen, EKG, Troponin, and serial changes to determine if it is an Acute Myocardial Infarction or myocardial injury due to an underlying chronic condition.         Magnesium [[id number]]  (Normal) Collected: 12/07/24 0757    Specimen: Blood Updated: 12/07/24 0910     Magnesium 1.7 mg/dL     Comprehensive Metabolic Panel [[id number]]  (Abnormal) Collected: 12/07/24 0757    Specimen: Blood Updated: 12/07/24 0910     Glucose 127 mg/dL       BUN 11 mg/dL      Creatinine 0.58 mg/dL      Sodium 136 mmol/L      Potassium 4.4 mmol/L      Comment: Slight hemolysis detected by analyzer. Result may be falsely elevated.        Chloride 96 mmol/L      CO2 29.5 mmol/L      Calcium 8.8 mg/dL      Total Protein 7.2 g/dL      Albumin 3.9 g/dL      ALT (SGPT) 13 U/L      AST (SGOT) 23 U/L      Comment: Slight hemolysis detected by analyzer. Result may be falsely elevated.        Alkaline Phosphatase 62 U/L      Total Bilirubin <0.2 mg/dL      Globulin 3.3 gm/dL      A/G Ratio 1.2 g/dL      BUN/Creatinine Ratio 19.0     Anion Gap 10.5 mmol/L      eGFR 104.4 mL/min/1.73     Narrative:      GFR Normal >60  Chronic Kidney Disease <60  Kidney Failure <15      Lactic Acid, Plasma [[id number]]  (Normal) Collected: 12/07/24 0757    Specimen: Blood Updated: 12/07/24 0848     Lactate 1.4 mmol/L     Blood Culture - Blood, Arm, Right [[id number]] Collected: 12/07/24 0757    Specimen: Blood from Arm, Right Updated: 12/07/24 0822    Blood Culture - Blood, Arm, Left [[id number]] Collected: 12/07/24 0757    Specimen: Blood from Arm, Left Updated: 12/07/24 0822    COVID-19, FLU A/B, RSV PCR 1 HR TAT - Swab, Nasopharynx [[id number]]  (Abnormal) Collected: 12/07/24 0757    Specimen: Swab from Nasopharynx Updated: 12/07/24 0943     COVID19 Detected     Influenza A PCR Not Detected     Influenza B PCR Not Detected     RSV, PCR Not Detected    Narrative:      Fact sheet for providers: https://www.fda.gov/media/036966/download    Fact sheet for patients: https://www.fda.gov/media/383124/download    Test performed by PCR.    Ferritin [[id number]]  (Abnormal) Collected: 12/07/24 0757    Specimen: Blood Updated: 12/07/24 1409     Ferritin 151.20 ng/mL     Narrative:      <12 ng/mL usually associated with Iron Deficiency Anemia. Above normal range levels may be due to Hepatic and/or Chronic Inflammatory Disease.  Results may be falsely decreased if patient taking Biotin.      Lactate Dehydrogenase  "[[id number]]  (Abnormal) Collected: 12/07/24 0757    Specimen: Blood Updated: 12/07/24 1440      U/L      Comment: Specimen hemolyzed.  Results may be affected.       Procalcitonin [[id number]]  (Abnormal) Collected: 12/07/24 0757    Specimen: Blood Updated: 12/07/24 1409     Procalcitonin 0.36 ng/mL     Narrative:      As a Marker for Sepsis (Non-Neonates):    1. <0.5 ng/mL represents a low risk of severe sepsis and/or septic shock.  2. >2 ng/mL represents a high risk of severe sepsis and/or septic shock.    As a Marker for Lower Respiratory Tract Infections that require antibiotic therapy:    PCT on Admission    Antibiotic Therapy       6-12 Hrs later    >0.5                Strongly Recommended  >0.25 - <0.5        Recommended  0.1 - 0.25          Discouraged              Remeasure/reassess PCT  <0.1                Strongly Discouraged     Remeasure/reassess PCT    As 28 day mortality risk marker: \"Change in Procalcitonin Result\" (>80% or <=80%) if Day 0 (or Day 1) and Day 4 values are available. Refer to http://www.[employer]-pct-calculator.com    Change in PCT <=80%  A decrease of PCT levels below or equal to 80% defines a positive change in PCT test result representing a higher risk for 28-day all-cause mortality of patients diagnosed with severe sepsis for septic shock.    Change in PCT >80%  A decrease of PCT levels of more than 80% defines a negative change in PCT result representing a lower risk for 28-day all-cause mortality of patients diagnosed with severe sepsis or septic shock.    This test is Prognostic not Diagnostic, if elevated correlate with clinical findings before administering antibiotic treatment.        C-reactive Protein [[id number]]  (Abnormal) Collected: 12/07/24 0757    Specimen: Blood Updated: 12/07/24 1403     C-Reactive Protein 10.88 mg/dL     BNP [[id number]]  (Normal) Collected: 12/07/24 0757    Specimen: Blood Updated: 12/07/24 1409     proBNP 627.1 pg/mL     Narrative:      This " assay is used as an aid in the diagnosis of individuals suspected of having heart failure. It can be used as an aid in the diagnosis of acute decompensated heart failure (ADHF) in patients presenting with signs and symptoms of ADHF to the emergency department (ED). In addition, NT-proBNP of <300 pg/mL indicates ADHF is not likely.    Age Range Result Interpretation  NT-proBNP Concentration (pg/mL:      <50             Positive            >450                   Gray                 300-450                    Negative             <300    50-75           Positive            >900                  Gray                300-900                  Negative            <300      >75             Positive            >1800                  Gray                300-1800                  Negative            <300    POC Glucose Once [[id number]]  (Abnormal) Collected: 12/07/24 0820    Specimen: Blood Updated: 12/07/24 0833     Glucose 143 mg/dL      Comment: Serial Number: [id number]:  [id number]       Blood Gas, Arterial - [[id number]]  (Abnormal) Collected: 12/07/24 0820    Specimen: Arterial Blood Updated: 12/07/24 0833     Site Right Brachial     Shane's Test N/A     pH, Arterial 7.367 pH units      pCO2, Arterial 61.3 mm Hg      pO2, Arterial 54.7 mm Hg      HCO3, Arterial 35.2 mmol/L      Base Excess, Arterial 7.5 mmol/L      Comment: Serial Number: [id number]:  [id number]        O2 Saturation, Arterial 85.8 %      Hemoglobin, Blood Gas 14.9 g/dL      Hematocrit, Blood Gas 44.0 %      Barometric Pressure for Blood Gas 753.2000 mmHg      Modality Cannula     FIO2 36 %      Flow Rate 4.0000 lpm      Notified Who dr gilliland     Read Back Yes     Notified Time --     Hemodilution No     PO2/FIO2 152    POC Lactate [[id number]]  (Normal) Collected: 12/07/24 0820    Specimen: Arterial Blood Updated: 12/07/24 0833     Lactate 0.8 mmol/L      Comment: Serial Number: [id number]:  [id number]       POC Electrolyte Panel [[id number]]  (Abnormal)  Collected: 12/07/24 0820    Specimen: Arterial Blood Updated: 12/07/24 0833     Sodium 141 mmol/L      POC Potassium 3.8 mmol/L      Chloride 97 mmol/L      Ionized Calcium 1.18 mmol/L      Comment: Serial Number: [id number]:  [id number]       POC Glucose Once [[id number]]  (Abnormal) Collected: 12/07/24 0906    Specimen: Blood Updated: 12/07/24 0907     Glucose 128 mg/dL      Comment: Serial Number: [id number]:  [id number]       CBC & Differential [[id number]]  (Abnormal) Collected: 12/07/24 0932    Specimen: Blood Updated: 12/07/24 0952    Narrative:      The following orders were created for panel order CBC & Differential.  Procedure                               Abnormality         Status                     ---------                               -----------         ------                     CBC Auto Differential[[id number]]        Abnormal            Final result                 Please view results for these tests on the individual orders.    CBC Auto Differential [[id number]]  (Abnormal) Collected: 12/07/24 0932    Specimen: Blood Updated: 12/07/24 0952     WBC 5.69 10*3/mm3      RBC 4.59 10*6/mm3      Hemoglobin 12.4 g/dL      Hematocrit 41.5 %      MCV 90.4 fL      MCH 27.0 pg      MCHC 29.9 g/dL      RDW 14.2 %      RDW-SD 47.0 fl      MPV 10.3 fL      Platelets 145 10*3/mm3      Neutrophil % 76.6 %      Lymphocyte % 9.0 %      Monocyte % 13.0 %      Eosinophil % 0.5 %      Basophil % 0.2 %      Immature Grans % 0.7 %      Neutrophils, Absolute 4.36 10*3/mm3      Lymphocytes, Absolute 0.51 10*3/mm3      Monocytes, Absolute 0.74 10*3/mm3      Eosinophils, Absolute 0.03 10*3/mm3      Basophils, Absolute 0.01 10*3/mm3      Immature Grans, Absolute 0.04 10*3/mm3      nRBC 0.0 /100 WBC     D-dimer, Quantitative [[id number]]  (Abnormal) Collected: 12/07/24 0932    Specimen: Blood Updated: 12/07/24 1354     D-Dimer, Quantitative 0.69 MCGFEU/mL     Narrative:      According to the assay 's published  "package insert, a normal (<0.50 MCGFEU/mL) D-dimer result in conjunction with a non-high clinical probability assessment, excludes deep vein thrombosis (DVT) and pulmonary embolism (PE) with high sensitivity.    D-dimer values increase with age and this can make VTE exclusion of an older population difficult. To address this, the [employer], based on best available evidence and recent guidelines, recommends that clinicians use age-adjusted D-dimer thresholds in patients greater than 50 years of age with: a) a low probability of PE who do not meet all Pulmonary Embolism Rule Out Criteria, or b) in those with intermediate probability of PE.   The formula for an age-adjusted D-dimer cut-off is \"age/100\".  For example, a 60 year old patient would have an age-adjusted cut-off of 0.60 MCGFEU/mL and an 80 year old 0.80 MCGFEU/mL.    Urinalysis With Microscopic If Indicated (No Culture) - Straight Cath [[id number]]  (Normal) Collected: 12/07/24 0941    Specimen: Urine from Straight Cath Updated: 12/07/24 0954     Color, UA Yellow     Appearance, UA Clear     pH, UA <=5.0     Specific Gravity, UA 1.028     Glucose, UA Negative     Ketones, UA Negative     Bilirubin, UA Negative     Blood, UA Negative     Protein, UA Negative     Leuk Esterase, UA Negative     Nitrite, UA Negative     Urobilinogen, UA 0.2 E.U./dL    Narrative:      Urine microscopic not indicated.    Blood Gas, Arterial - [[id number]]  (Abnormal) Collected: 12/07/24 1351    Specimen: Arterial Blood Updated: 12/07/24 1357     Site Left Radial     Shane's Test Positive     pH, Arterial 7.340 pH units      pCO2, Arterial 63.4 mm Hg      pO2, Arterial 66.1 mm Hg      HCO3, Arterial 34.2 mmol/L      Base Excess, Arterial 6.0 mmol/L      Comment: Serial Number: [id number]:  [id number]        O2 Saturation, Arterial 90.7 %      Hemoglobin, Blood Gas 15.1 g/dL      Hematocrit, Blood Gas 44.0 %      Barometric Pressure for Blood Gas 746.3000 mmHg      " Modality Cannula     Flow Rate 3.5000 lpm      Notified Who dr. jain     Read Back Yes     Notified Time --     Hemodilution No    POC Glucose Once [[id number]]  (Abnormal) Collected: 12/07/24 1351    Specimen: Arterial Blood Updated: 12/07/24 1357     Glucose 272 mg/dL      Comment: Serial Number: [id number]:  [id number]       POC Lactate [[id number]]  (Normal) Collected: 12/07/24 1351    Specimen: Arterial Blood Updated: 12/07/24 1357     Lactate 0.7 mmol/L      Comment: Serial Number: [id number]:  [id number]       POC Electrolyte Panel [[id number]]  (Abnormal) Collected: 12/07/24 1351    Specimen: Arterial Blood Updated: 12/07/24 1357     Sodium 140 mmol/L      POC Potassium 4.2 mmol/L      Chloride 97 mmol/L      Ionized Calcium 1.23 mmol/L      Comment: Serial Number: [id number]:  [id number]                Imaging:    CT Angiogram Chest Pulmonary Embolism    Result Date: 12/7/2024  CT ANGIOGRAM CHEST PULMONARY EMBOLISM Date of Exam: 12/7/2024 9:18 AM EST Indication: soa shortness of breath. Head and neck cancer. Comparison: Chest x-ray 12/7/2024, CT chest without contrast 8/22/2024 Technique: Axial CT images were obtained of the chest after the uneventful intravenous administration of iodinated contrast utilizing pulmonary embolism protocol.  Reconstructed coronal and sagittal images were also obtained. Automated exposure control and iterative construction methods were used. Findings: Pulmonary Arteries: Excellent contrast opacification of the pulmonary arteries.  No intraluminal filling defects to suggest pulmonary embolus.  The pulmonary arteries are normal in caliber. Hilum and Mediastinum: Pathologically enlarged right hilar lymph node measures 1.8 x 2.9 cm image 134. Subcarinal lymph node measures up to 1.4 cm in short axis. There are calcified hilar mediastinal lymph nodes. There is a prosthetic aortic valve. There  are annular calcifications mitral valve. Normal heart size.  No significant coronary artery  atherosclerotic disease. Unremarkable thoracic aorta.   No pericardial effusion. Lung Parenchyma and Pleura: Moderate emphysema. Scattered groundglass opacities similar to previous exam. There is a noncalcified nodule in the right lower lobe on image 59. It measures 7 mm. This is new. Calcified granuloma in the posterior lateral right lower lobe is unchanged. Previous nodularity in the right middle lobe has resolved. No focal consolidations.  No suspicious pulmonary nodules.  No endobronchial lesions.  No significant pleural effusions. Upper Abdomen: Unremarkable. Soft tissues: There is fullness in the proximal airway similar to previous PET/CT and CT chest. Recommend clinical correlation for upper airway narrowing or obstruction. There is stranding in the fat of the neck at the level of the larynx and fluid/increased density in the prevertebral tissues. Could be seen with posttreatment changes or active malignancy. Osseous structures: No aggressive focal lytic or sclerotic osseous lesions. Osteopenia.     1.No evidence of pulmonary embolus. 2. Pathologically enlarged hilar lymph node on the right. Could be reactive or neoplastic. 3.Moderate emphysema. 4.Narrowing of the airway at the level of the larynx with soft tissue edema and thickening at this location similar to previous CT chest. Question posttreatment changes or active malignancy. Correlate for signs or symptoms of upper airway obstruction. 5.New 7 mm pulmonary nodule right lower lobe. Follow-up CT chest in 3 months time is recommended. Electronically Signed: Mara Pritchard MD  12/7/2024 9:38 AM EST  Workstation ID: [id number]    XR Chest 1 View    Result Date: 12/7/2024  XR CHEST 1 VW Date of Exam: 12/7/2024 7:21 AM EST Indication: Weak/Dizzy/AMS triage protocol Comparison: CT chest from August 22, 2024 Findings: A left internal jugular port has its tip at the low SVC. The lungs are clear. The heart and mediastinal contours appear normal. There is pulmonary  vascular congestion. The osseous structures appear intact.     Impression: 1.Pulmonary vascular congestion, which could reflect mild pulmonary edema. 2.No focal pneumonia identified. Electronically Signed: Ryan Farnsworth MD  12/7/2024 7:34 AM EST  Workstation ID: [id number]       Differential Diagnosis and Discussion:    Altered Mental Status: Based on the patient's signs and symptoms, differential diagnosis includes but is not limited to meningitis, stroke, sepsis, subarachnoid hemorrhage, intracranial bleeding, encephalitis, and metabolic encephalopathy.  Dyspnea: Differential diagnosis includes but is not limited to metabolic acidosis, neurological disorders, psychogenic, asthma, pneumothorax, upper airway obstruction, COPD, pneumonia, noncardiogenic pulmonary edema, interstitial lung disease, anemia, congestive heart failure, and pulmonary embolism    All labs were reviewed and interpreted by me.  All X-rays impressions were independently interpreted by me.  CT scan radiology impression was interpreted by me.    MDM  Number of Diagnoses or Management Options  COVID-19  Shortness of breath  Diagnosis management comments: In summary this is a 59-year-old female with multiple comorbidities who presents to the emergency department for evaluation of fever, mental status changes and shortness of breath.  Patient family members in the house have tested positive for COVID-19 recently.  Chest x-ray reviewed by me is unremarkable for acute pathology.  CT brain reviewed by me is unremarkable for acute pathology.  CBC independently reviewed and interpreted by me and shows no critical abnormalities.  CMP independently reviewed and interpreted by me and shows no critical abnormalities.  COVID-19 test positive.  Patient has received breathing treatment and steroids in the emergency department but is persistently tachycardic and does have ultimately status still.  I feel it would be best for her to be admitted to the hospital.   Patient case has been discussed with the hospitalist team who will admit to the hospital for further evaluation and continuation of treatment.                       Patient Care Considerations:    SEPSIS IS NOT PRESENT IN THE EMERGENCY DEPARTMENT: Patient meets SIRS criteria in the emergency department however the patient does not have a known source of bacterial infection to confirm the diagnosis of sepsis.        Consultants/Shared Management Plan:    Hospitalist: I have discussed the case with Dr. Rose who agrees to accept the patient for admission.    Social Determinants of Health:    Patient has presented with family members who are responsible, reliable and will ensure follow up care.      Disposition and Care Coordination:    Admit:   Through independent evaluation of the patient's history, physical, and imperical data, the patient meets criteria for inpatient admission to the hospital.        Final diagnoses:   Shortness of breath   COVID-19        ED Disposition       ED Disposition   Decision to Admit    Condition   --    Comment   Level of Care: Telemetry [5]   Diagnosis: COVID-19 virus infection [[id number]]   Admitting Physician: MORA ROSE [[id number]]   Attending Physician: MORA ROSE [[id number]]   Isolate for COVID?: No [0]   Certification: I Certify That Inpatient Hospital Services Are Medically Necessary For Greater Than 2 Midnights                 This medical record created using voice recognition software.             Sebastian Gunn MD  12/07/24 1526      Electronically signed by Sebastian Gunn MD at 12/07/24 1526       Vital Signs (last day)       Date/Time Temp Temp src Pulse Resp BP Patient Position SpO2    12/07/24 1417 -- -- 114 17 -- Lying 97    12/07/24 1343 98.5 (36.9) Rectal -- -- -- -- --    12/07/24 1340 -- -- 92 -- 109/69 -- 92    12/07/24 1315 -- -- 105 -- 114/68 -- 90    12/07/24 1200 -- -- 108 -- 101/79 -- 92    12/07/24 1145 -- -- 122 -- 93/66 -- 95    12/07/24 1138 101.1  (38.4) Rectal -- -- -- -- --    12/07/24 1130 -- -- 112 -- 100/85 -- 94    12/07/24 1115 -- -- 122 -- 93/65 -- 90    12/07/24 1100 -- -- 123 -- 98/72 -- 93    12/07/24 1045 -- -- 135 -- 84/61 -- 92    12/07/24 0900 -- -- 137 -- 120/77 -- 95    12/07/24 0845 -- -- 137 -- 103/69 -- 96    12/07/24 0830 -- -- 137 -- 116/76 -- 92    12/07/24 0824 -- -- 132 28 -- -- 91    12/07/24 0815 -- -- 136 -- 96/64 -- 86    12/07/24 0800 -- -- 134 -- 108/68 -- 92    12/07/24 0707 103.7 (39.8) -- -- -- -- -- --    12/07/24 0703 98.7 (37.1) Oral 121 30 132/94 Lying 95          Oxygen Therapy (last day)       Date/Time SpO2 Device (Oxygen Therapy) Flow (L/min) (Oxygen Therapy) Oxygen Concentration (%) ETCO2 (mmHg)    12/07/24 1550 -- -- -- 28 --    12/07/24 1418 -- -- -- 28 --    12/07/24 1417 97 NPPV/NIV -- 28 --    12/07/24 1357 -- NPPV/NIV -- -- --    12/07/24 1340 92 -- -- -- --    12/07/24 1315 90 -- -- -- --    12/07/24 1200 92 -- -- -- --    12/07/24 1145 95 -- -- -- --    12/07/24 1130 94 -- -- -- --    12/07/24 1115 90 -- -- -- --    12/07/24 1100 93 -- -- -- --    12/07/24 1045 92 -- -- -- --    12/07/24 0900 95 -- -- -- --    12/07/24 0845 96 -- -- -- --    12/07/24 0830 92 -- -- -- --    12/07/24 0824 91 nasal cannula 4 -- --    12/07/24 0815 86 -- -- -- --    12/07/24 0800 92 -- -- -- --    12/07/24 0703 95 -- -- -- --          Facility-Administered Medications as of 12/7/2024   Medication Dose Route Frequency Provider Last Rate Last Admin    [COMPLETED] acetaminophen (TYLENOL) suppository 650 mg  650 mg Rectal Once Sebastian Gunn MD   650 mg at 12/07/24 0948    acetaminophen (TYLENOL) tablet 325 mg  325 mg Oral Q6H PRN Isaias Kay MD        aluminum-magnesium hydroxide-simethicone (MAALOX MAX) 400-400-40 MG/5ML suspension 15 mL  15 mL Oral Q6H PRN Isaias Kay MD        arformoterol (BROVANA) nebulizer solution 15 mcg  15 mcg Nebulization BID - RT Isaias Kay MD   15 mcg at 12/07/24 1415    azithromycin  (ZITHROMAX) tablet 500 mg  500 mg Oral Daily Isaias Kay MD        Followed by    [START ON 12/8/2024] azithromycin (ZITHROMAX) tablet 250 mg  250 mg Oral Daily Isaias Kay MD        sennosides-docusate (PERICOLACE) 8.6-50 MG per tablet 2 tablet  2 tablet Oral BID PRN Isaias Kay MD        And    polyethylene glycol (MIRALAX) packet 17 g  17 g Oral Daily PRN Isaias Kay MD        And    bisacodyl (DULCOLAX) EC tablet 5 mg  5 mg Oral Daily PRN Isaias Kay MD        And    bisacodyl (DULCOLAX) suppository 10 mg  10 mg Rectal Daily PRN Isaias Kay MD        budesonide (PULMICORT) nebulizer solution 0.5 mg  0.5 mg Nebulization BID - RT Isaias Kay MD   0.5 mg at 12/07/24 1415    calcium carbonate (TUMS) chewable tablet 500 mg (200 mg elemental)  2 tablet Oral BID PRN Isaias Kay MD        Calcium Replacement - Follow Nurse / BPA Driven Protocol   Not Applicable PRN Isaias Kay MD        cetirizine (zyrTEC) tablet 10 mg  10 mg Oral Daily Isaias Kay MD        dexAMETHasone (DECADRON) tablet 6 mg  6 mg Oral Daily Isaias Kay MD        Or    dexAMETHasone sodium phosphate injection 6 mg  6 mg Intravenous Daily Isaias Kay MD        [COMPLETED] dexAMETHasone sodium phosphate injection 8 mg  8 mg Intravenous Once Sebastian Gunn MD   8 mg at 12/07/24 0858    dextrose (D50W) (25 g/50 mL) IV injection 25 g  25 g Intravenous Q15 Min PRN Isaias Kay MD        dextrose (GLUTOSE) oral gel 15 g  15 g Oral Q15 Min PRN Isaias Kay MD        Enoxaparin Sodium (LOVENOX) syringe 40 mg  40 mg Subcutaneous Daily Isaias Kay MD        famotidine (PEPCID) tablet 20 mg  20 mg Oral Daily Isaias Kay MD        glucagon (GLUCAGEN) injection 1 mg  1 mg Intramuscular Q15 Min PRN Isaias Kay MD        Insulin Lispro (humaLOG) injection 2-7 Units  2-7 Units Subcutaneous 4x Daily AC & at Bedtime Isaias Kay MD        [COMPLETED] iopamidol (ISOVUE-370) 76 %  injection 100 mL  100 mL Intravenous Once in imaging Sebastian Gunn MD   100 mL at 12/07/24 0920    [COMPLETED] ipratropium-albuterol (DUO-NEB) nebulizer solution 3 mL  3 mL Nebulization Once Sebastian Gunn MD   3 mL at 12/07/24 0823    ipratropium-albuterol (DUO-NEB) nebulizer solution 3 mL  3 mL Nebulization Q4H PRN Isaias Kay MD        ketorolac (TORADOL) injection 30 mg  30 mg Intravenous Q6H PRN Isaias Kay MD        Magnesium Standard Dose Replacement - Follow Nurse / BPA Driven Protocol   Not Applicable PRN Isaias Kay MD        nitroglycerin (NITROSTAT) SL tablet 0.4 mg  0.4 mg Sublingual Q5 Min PRN Isaias Kay MD        ondansetron (ZOFRAN) injection 4 mg  4 mg Intravenous Q6H PRN Isaias Kay MD        Patient Supplied Pain Pump   Intrathecal Continuous Isaias Kay MD        Pharmacy Consult - Remdesivir for Severe COVID-19 (Within 7 days of symptom onset)   Not Applicable Continuous PRN Isaias Kay MD        Phosphorus Replacement - Follow Nurse / BPA Driven Protocol   Not Applicable PRN Isaias Kay MD        Potassium Replacement - Follow Nurse / BPA Driven Protocol   Not Applicable PRIsaias Lozano MD        [START ON 12/8/2024] remdesivir 100 mg in 270 mL NS  100 mg Intravenous Q24H Isaias Kay MD        remdesivir 200 mg in 290 mL NS  200 mg Intravenous Once Isaias Kay MD        sodium chloride 0.9 % flush 10 mL  10 mL Intravenous PRN Isaias Kay MD        sodium chloride 0.9 % flush 10 mL  10 mL Intravenous PRN Isaias Kay MD        sodium chloride 0.9 % flush 10 mL  10 mL Intravenous Q12H Isaias Kay MD   10 mL at 12/07/24 1414    sodium chloride 0.9 % flush 10 mL  10 mL Intravenous PRN Isaias Kay MD        sodium chloride 0.9 % infusion 40 mL  40 mL Intravenous PRN Isaias Kay MD         Orders (active)        Start     Ordered    12/08/24 1300  remdesivir 100 mg in 270 mL NS  Every 24 Hours         12/07/24 5565     "12/08/24 0900  azithromycin (ZITHROMAX) tablet 250 mg  Daily        Placed in \"Followed by\" Linked Group    12/07/24 1339    12/08/24 0600  Document Pulse Oximetry - On Room Air / Home O2 Level  Daily      Comments: Reapply Oxygen if O2 Sat Drops Below 88%    12/07/24 1339    12/08/24 0600  Comprehensive Metabolic Panel  Daily       12/07/24 1339    12/07/24 1800  Oral Care  2 Times Daily       12/07/24 1339    12/07/24 1730  Insulin Lispro (humaLOG) injection 2-7 Units  4 Times Daily Before Meals & Nightly         12/07/24 1339    12/07/24 1700  POC Glucose 4x Daily Before Meals & at Bedtime  4 Times Daily Before Meals & at Bedtime      Comments: Complete no more than 45 minutes prior to patient eating      12/07/24 1339    12/07/24 1600  Vital Signs  Every 4 Hours       12/07/24 1339    12/07/24 1551  MRSA Screen, PCR (Inpatient) - Swab, Nares  Once         12/07/24 1550    12/07/24 1545  remdesivir 200 mg in 290 mL NS  Once         12/07/24 1451    12/07/24 1538  SLP Consult: Eval & Treat Other, Swallow Disorder; Laryngeal cancer  Once         12/07/24 1537    12/07/24 1537  Diet: Diabetic; Consistent Carbohydrate; Texture: Pureed (NDD 1); Fluid Consistency: Nectar Thick  Diet Effective Now         12/07/24 1536    12/07/24 1537  PT Consult: Eval & Treat Functional Mobility Below Baseline  Once         12/07/24 1536    12/07/24 1537  OT Consult: Eval & Treat ADL Performance Below Baseline  Once         12/07/24 1536    12/07/24 1430  cetirizine (zyrTEC) tablet 10 mg  Daily         12/07/24 1339    12/07/24 1430  Patient Supplied Pain Pump  Continuous         12/07/24 1339    12/07/24 1430  sodium chloride 0.9 % flush 10 mL  Every 12 Hours Scheduled         12/07/24 1339    12/07/24 1430  famotidine (PEPCID) tablet 20 mg  Daily         12/07/24 1339    12/07/24 1430  Enoxaparin Sodium (LOVENOX) syringe 40 mg  Daily         12/07/24 1339    12/07/24 1430  dexAMETHasone (DECADRON) tablet 6 mg  Daily        Placed in " "\"Or\" Linked Group    12/07/24 1339    12/07/24 1430  dexAMETHasone sodium phosphate injection 6 mg  Daily        Placed in \"Or\" Linked Group    12/07/24 1339    12/07/24 1430  budesonide (PULMICORT) nebulizer solution 0.5 mg  2 Times Daily - RT         12/07/24 1339    12/07/24 1430  arformoterol (BROVANA) nebulizer solution 15 mcg  2 Times Daily - RT         12/07/24 1339    12/07/24 1430  azithromycin (ZITHROMAX) tablet 500 mg  Daily        Placed in \"Followed by\" Linked Group    12/07/24 1339    12/07/24 1416  NIPPV - Provider Settings  Until Discontinued         12/07/24 1416    12/07/24 1416  Continuous Pulse Oximetry  Continuous         12/07/24 1416    12/07/24 1400  Incentive Spirometry  Every 2 Hours While Awake       12/07/24 1339    12/07/24 1350  Inpatient Case Management  Consult  Once        Provider:  (Not yet assigned)    12/07/24 1349    12/07/24 1340  Advance Diet As Tolerated -  Until Discontinued         12/07/24 1339    12/07/24 1340  Insert Peripheral IV  Once         12/07/24 1339    12/07/24 1340  Maintain Sequential Compression Device  Continuous         12/07/24 1339    12/07/24 1340  Continuous Cardiac Monitoring  Continuous        Comments: Follow Standing Orders As Outlined in Process Instructions (Open Order Report to View Full Instructions)    12/07/24 1339    12/07/24 1340  Maintain IV Access  Continuous         12/07/24 1339    12/07/24 1340  Telemetry - Place Orders & Notify Provider of Results When Patient Experiences Acute Chest Pain, Dysrhythmia or Respiratory Distress  Continuous        Comments: Open Order Report to View Parameters Requiring Provider Notification    12/07/24 1339    12/07/24 1340  Activity - Ad Shena  Until Discontinued         12/07/24 1339    12/07/24 1340  Ambulate Patient  Every Shift       12/07/24 1339    12/07/24 1340  Intake & Output  Every Shift       12/07/24 1339    12/07/24 1340  Daily Weights  Daily       12/07/24 1339    12/07/24 1340 "  Intake & Output  Every Shift       12/07/24 1339    12/07/24 1340  Respiratory Culture - Sputum, Cough  Once         12/07/24 1339    12/07/24 1340  Legionella Antigen, Urine - Urine, Urine, Clean Catch  Once         12/07/24 1339    12/07/24 1340  Respiratory Panel PCR w/COVID-19(SARS-CoV-2) TANIKA/MARY/MINNIE/PAD/COR/ELLEN In-House, NP Swab in UTM/VTM, 2 HR TAT - Swab, Nasopharynx  Once         12/07/24 1339    12/07/24 1340  S. Pneumo Ag Urine or CSF - Urine, Urine, Clean Catch  Once         12/07/24 1339    12/07/24 1340  RT to Initiate Bronchodilator Protocol  Once         12/07/24 1339    12/07/24 1340  RT to Initiate Bronchopulmonary Hygiene Protocol  Once         12/07/24 1339    12/07/24 1340  Access Port  Continuous         12/07/24 1339    12/07/24 1339  sodium chloride 0.9 % flush 10 mL  As Needed         12/07/24 1339    12/07/24 1339  sodium chloride 0.9 % infusion 40 mL  As Needed         12/07/24 1339    12/07/24 1339  calcium carbonate (TUMS) chewable tablet 500 mg (200 mg elemental)  2 Times Daily PRN         12/07/24 1339    12/07/24 1339  aluminum-magnesium hydroxide-simethicone (MAALOX MAX) 400-400-40 MG/5ML suspension 15 mL  Every 6 Hours PRN         12/07/24 1339    12/07/24 1339  ondansetron (ZOFRAN) injection 4 mg  Every 6 Hours PRN         12/07/24 1339    12/07/24 1339  nitroglycerin (NITROSTAT) SL tablet 0.4 mg  Every 5 Minutes PRN         12/07/24 1339    12/07/24 1339  Potassium Replacement - Follow Nurse / BPA Driven Protocol  As Needed         12/07/24 1339    12/07/24 1339  Magnesium Standard Dose Replacement - Follow Nurse / BPA Driven Protocol  As Needed         12/07/24 1339    12/07/24 1339  Phosphorus Replacement - Follow Nurse / BPA Driven Protocol  As Needed         12/07/24 1339    12/07/24 1339  Calcium Replacement - Follow Nurse / BPA Driven Protocol  As Needed         12/07/24 1339    12/07/24 1339  ketorolac (TORADOL) injection 30 mg  Every 6 Hours PRN         12/07/24 1338     "12/07/24 1339  sennosides-docusate (PERICOLACE) 8.6-50 MG per tablet 2 tablet  2 Times Daily PRN        Placed in \"And\" Linked Group    12/07/24 1339    12/07/24 1339  polyethylene glycol (MIRALAX) packet 17 g  Daily PRN        Placed in \"And\" Linked Group    12/07/24 1339    12/07/24 1339  bisacodyl (DULCOLAX) EC tablet 5 mg  Daily PRN        Placed in \"And\" Linked Group    12/07/24 1339    12/07/24 1339  bisacodyl (DULCOLAX) suppository 10 mg  Daily PRN        Placed in \"And\" Linked Group    12/07/24 1339    12/07/24 1339  Pharmacy Consult - Remdesivir for Severe COVID-19 (Within 7 days of symptom onset)  Continuous PRN         12/07/24 1339    12/07/24 1339  ipratropium-albuterol (DUO-NEB) nebulizer solution 3 mL  Every 4 Hours PRN         12/07/24 1339    12/07/24 1339  dextrose (GLUTOSE) oral gel 15 g  Every 15 Minutes PRN         12/07/24 1339    12/07/24 1339  dextrose (D50W) (25 g/50 mL) IV injection 25 g  Every 15 Minutes PRN         12/07/24 1339    12/07/24 1339  glucagon (GLUCAGEN) injection 1 mg  Every 15 Minutes PRN         12/07/24 1339    12/07/24 1339  acetaminophen (TYLENOL) tablet 325 mg  Every 6 Hours PRN         12/07/24 1339    12/07/24 1131  Code Status and Medical Interventions: CPR (Attempt to Resuscitate); Full Support  Continuous         12/07/24 1140    12/07/24 1100  Inpatient Hospitalist Consult  Once        Specialty:  Hospitalist  Provider:  Isaias Kay MD    12/07/24 1059    12/07/24 0719  Blood Culture - Blood, Arm, Right  Once         12/07/24 0719    12/07/24 0719  Blood Culture - Blood, Arm, Left  Once         12/07/24 0719    12/07/24 0718  Insert Peripheral IV  Once         12/07/24 0719    12/07/24 0718  sodium chloride 0.9 % flush 10 mL  As Needed         12/07/24 0719    12/07/24 0702  Orthostatic Blood Pressure  Once         12/07/24 0701    12/07/24 0702  Fall Precautions  Continuous         12/07/24 0701    12/07/24 0702  Insert Peripheral IV  Once         12/07/24 " 0701    12/07/24 0701  sodium chloride 0.9 % flush 10 mL  As Needed         12/07/24 0701    Unscheduled  Oxygen Therapy- Nasal Cannula; Titrate 1-6 LPM Per SpO2; 90 - 95%  Continuous PRN       12/07/24 0701    Unscheduled  Oxygen Therapy- Nasal Cannula; Titrate 1-6 LPM Per SpO2; 90 - 95%  Continuous PRN       12/07/24 0719    Unscheduled  Up in Chair  As Needed       12/07/24 1339    Unscheduled  Oxygen Therapy- Nasal Cannula; Titrate 1-6 LPM Per SpO2; 90 - 95%  Continuous PRN      Comments: Notify Provider if Patient Requires Greater Than 5 LPM    12/07/24 1339    Unscheduled  Follow Hypoglycemia Standing Orders For Blood Glucose <70 & Notify Provider of Treatment  As Needed      Comments: Follow Hypoglycemia Orders As Outlined in Process Instructions (Open Order Report to View Full Instructions)  Notify Provider Any Time Hypoglycemia Treatment is Administered    12/07/24 1339    Unscheduled  Oxygen Therapy- Nasal Cannula; Titrate 1-6 LPM Per SpO2; 90 - 95%  Continuous PRN       12/07/24 1416                  Ventilator/Non-Invasive Ventilation Settings (From admission, onward)       Start     Ordered    12/07/24 1416  NIPPV - Provider Settings  (CPAP / BiPAP - Provider Settings)  Until Discontinued        Question Answer Comment   Indication Acute Respiratory Failure    Type BIPAP    IPAP 14    EPAP 6    Titrate Oxygen for SpO2 90% or Greater        12/07/24 1416

## 2024-12-07 NOTE — ED PROVIDER NOTES
Time: 1:49 PM EST  Date of encounter:  12/7/2024  Independent Historian/Clinical History and Information was obtained by:   Patient    History is limited by: Altered Mental Status    Chief Complaint: Shortness of breath      History of Present Illness:  Patient is a 59 y.o. year old female who presents to the emergency department for evaluation of shortness of breath.  Patient has multiple other medical problems but patient's family at bedside reports that the whole house has had COVID-19 recently.  They report significant change in patient in the last 24 hours.      Patient Care Team  Primary Care Provider: Virgil Salas MD    Past Medical History:     Allergies   Allergen Reactions    Amoxicillin Shortness Of Breath     Has tolerated Cefazolin, Ceftriaxone, and Cefepime -Jermaine Melida, [facility]    Ceclor [Cefaclor] Shortness Of Breath     Has tolerated Cefazolin, Ceftriaxone, and Cefepime -Jermaine Melida, [facility]      Penicillins Shortness Of Breath     Has tolerated Cefazolin, Ceftriaxone, and Cefepime -Jermaine Melida, [facility]    Sulfa Antibiotics Rash    Valium [Diazepam] Mental Status Change     DEPRESSED    Ambien [Zolpidem] Mental Status Change    Aspirin GI Intolerance    Ativan [Lorazepam] Mental Status Change    Benadryl [Diphenhydramine] Anxiety     AND MAKES HER HYPER    Biaxin [Clarithromycin] Unknown - Low Severity    Cephalexin Unknown - Low Severity    Clindamycin Unknown - Low Severity    Compazine [Prochlorperazine Edisylate] Rash    Contrast Dye (Echo Or Unknown Ct/Mr) Other (See Comments)     Caused pain in her arm    Doxycycline Rash    Nsaids GI Intolerance    Phenergan [Promethazine Hcl] GI Intolerance    Promethazine GI Intolerance    Vancomycin Itching     Past Medical History:   Diagnosis Date    Anesthesia     REPORTS HAS GOT REAL EMOTIONAL AND HAS BECOME ANGRY BEFORE    Anxiety     Aortic stenosis, severe     HAS BIO VALVE REPLACED    Arthritis     Asthma     Back pain     Blood clotting  disorder     Cancer     Cancer associated pain     HODGKINS LYMPHOMA    CHF (congestive heart failure)     Chronic low back pain with sciatica     Chronic pain disorder     COPD (chronic obstructive pulmonary disease)     Coronary artery disease     Diabetes mellitus     TYPE 2    Diabetes mellitus     Dyspnea on effort     GERD (gastroesophageal reflux disease)     H/O lumpectomy     H/O: hysterectomy     Hiatal hernia     History of degenerative disc disease     History of kidney stones     History of pulmonary embolus (PE)     History of transfusion     AS A CHILD NO TRANSFUSION REACTION    History of transfusion     Hodgkin's lymphoma     5/19/23  5 YEARS SINCE LAST CHEMO TX    Hypertension     Immobility     Liver disease     DENIES ANY CURRENT ISSUES    Lupus     Mitral valve prolapse     Nausea vomiting and diarrhea 3/7/2024    Panic disorder     Pelvic pain     Peripheral neuropathy     Personal history of DVT (deep vein thrombosis)     Presence of intrathecal pump     PTSD (post-traumatic stress disorder)     Sacroiliac joint disease     SI (sacroiliac) joint inflammation     Sleep apnea     Venous insufficiency      Past Surgical History:   Procedure Laterality Date    ABDOMINAL SURGERY      BACK SURGERY      SPINAL FUSION     BACK SURGERY      BLADDER REPAIR      CARDIAC CATHETERIZATION N/A 03/06/2019    Procedure: Valvuloplasty;  Surgeon: Wayne Johnson MD;  Location: [facility] INVASIVE LOCATION;  Service: Cardiology    CARDIAC CATHETERIZATION      CARDIAC CATHETERIZATION Left 5/31/2023    Procedure: Cardiac Catheterization/Vascular Study;  Surgeon: Poncho Reid MD;  Location: [facility] CATH INVASIVE LOCATION;  Service: Cardiovascular;  Laterality: Left;    CARDIAC SURGERY      CARDIAC VALVE REPLACEMENT  2021    CHOLECYSTECTOMY      COLONOSCOPY N/A 12/29/2021    Procedure: COLONOSCOPY;  Surgeon: Karine Orlando MD;  Location: [facility] ENDOSCOPY;  Service: Gastroenterology;   Laterality: N/A;  POOR PREP    COLONOSCOPY N/A 04/13/2022    Procedure: COLONOSCOPY WITH POLYPECTOMY;  Surgeon: Karine Orlando MD;  Location: [facility] ENDOSCOPY;  Service: Gastroenterology;  Laterality: N/A;  COLON POLYP, HEMORRHOIDS, POOR PREP    COLONOSCOPY      DIRECT LARYNGOSCOPY, ESOPHAGOSCOPY, BRONCHOSCOPY N/A 5/22/2023    Procedure: DIRECT LARYNGOSCOPY WITH BIOPSIES , ESOPHAGOSCOPY, BRONCHOSCOPY;  Surgeon: Ronnie Mcgarry MD;  Location: [facility] OR OSC;  Service: ENT;  Laterality: N/A;    ENDOSCOPY N/A 12/29/2021    Procedure: ESOPHAGOGASTRODUODENOSCOPY;  Surgeon: Karine Orlando MD;  Location: [facility] ENDOSCOPY;  Service: Gastroenterology;  Laterality: N/A;  ESOPHAGITIS, GASTRITIS, HIATAL HERNIA    ENDOSCOPY      ENDOSCOPY N/A 4/16/2024    Procedure: ESOPHAGOGASTRODUODENOSCOPY WITH BIOPSIES;  Surgeon: Karine Orlando MD;  Location: [facility] ENDOSCOPY;  Service: Gastroenterology;  Laterality: N/A;  ESOPHAGITIS, HIATAL HERNIA    HYSTERECTOMY      LYMPHADENECTOMY      PAIN PUMP INSERTION/REVISION N/A 10/18/2019    Procedure: PAIN PUMP INSERTION [facility] 10-18-19 [place];  Surgeon: Horace Rios MD;  Location: [facility];  Service: Pain Management    PAIN PUMP INSERTION/REVISION N/A 11/23/2020    Procedure: pain pump removal;  Surgeon: Horace Rios MD;  Location: [facility];  Service: Pain Management;  Laterality: N/A;    PEG TUBE INSERTION N/A 7/26/2023    Procedure: PERCUTANEOUS ENDOSCOPIC GASTROSTOMY TUBE INSERTION;  Surgeon: Kody Mercer MD;  Location: [facility] ENDOSCOPY;  Service: General;  Laterality: N/A;  SUCCESSFUL PEG TUBE PLACE PLACEMENT    PORTACATH PLACEMENT      IN LEFT CHEST REPORTS THAT IT IS NOT FUNCTIONING    SKIN BIOPSY      TONSILLECTOMY      TUBAL ABDOMINAL LIGATION      TUMOR REMOVAL       Family History   Problem Relation Age of Onset    Heart failure Mother     Anxiety disorder Mother     Prostate cancer Father     Depression Sister     Bipolar  disorder Sister     Pancreatic cancer Sister     ADD / ADHD Brother     Thyroid cancer Maternal Grandmother     Pancreatic cancer Paternal Grandmother     ADD / ADHD Grandson     ADD / ADHD Granddaughter     ADD / ADHD Nephew     [place] Hyperthermia Neg Hx        Home Medications:  Prior to Admission medications    Medication Sig Start Date End Date Taking? Authorizing Provider   ALPRAZolam (XANAX) 0.25 MG tablet Take 2 tablets by mouth 2 (Two) Times a Day As Needed. 11/25/24  Yes Nette Jiménez MD   predniSONE (DELTASONE) 10 MG tablet Take 1 tablet by mouth Daily. 11/20/24 12/7/24 Yes Nette Jiménez MD   acetaminophen (Tylenol) 325 MG tablet Take 1 tablet by mouth Every 6 (Six) Hours As Needed for Mild Pain, Headache or Fever.    ProviderNette MD   albuterol sulfate  (90 Base) MCG/ACT inhaler Inhale 2 puffs Every 4 (Four) Hours As Needed for Wheezing. 11/20/23   Katia Wright MD   cetirizine (zyrTEC) 10 MG tablet Take 1 tablet by mouth Daily.    ProviderNette MD   clopidogrel (PLAVIX) 75 MG tablet Take 1 tablet by mouth Daily. 8/7/24   Katia Wright MD   mupirocin (BACTROBAN) 2 % ointment Apply 1 Application topically to the appropriate area as directed 3 (Three) Times a Day. 4/10/24   Katia Wright MD   pain patient supplied pump by Intrathecal route Continuous. Type of Medication: Hydromorphone 15 mg/ml and Bupivacaine 0.5 mg/ml  [employer] [phone]  Provider:Dr. Boudreaux  Provider number: [phone]  Basal Dose: Hydromorphone 7.518 mg/day Bupivacaine 0.78908 mg/day  Pump capacity: 40ml  ( MAX of Hydromorphone 9.456 mg/ day; Bupivacaine 0.37016 mg /day)  Bolus Dose:Hydromorphone 0.500 mg, Bupivacaine 0.09048 mg  Max activations: 4 per day  Lockout 3 hours. 1 Bolus per every 3 hours   Next refill-  every 60 days 11/13/24   Alarm date- 11/17/24  Pump manufacture:MedManny Escobar MD   predniSONE (DELTASONE) 20 MG tablet Take 2 tablets by mouth Daily. 11/11/24    "Provider, Historical, MD        Social History:   Social History     Tobacco Use    Smoking status: Every Day     Current packs/day: 1.50     Average packs/day: 1.5 packs/day for 45.9 years (68.9 ttl pk-yrs)     Types: Cigarettes     Start date: 1979     Passive exposure: Current    Smokeless tobacco: Never   Vaping Use    Vaping status: Never Used   Substance Use Topics    Alcohol use: Never    Drug use: Never         Review of Systems:  Review of Systems   Unable to perform ROS: Mental status change        Physical Exam:  /69   Pulse 114   Temp 98.5 °F (36.9 °C) (Rectal)   Resp 17   Ht 167.6 cm (66\")   Wt 64 kg (141 lb 1.5 oz)   LMP  (LMP Unknown)   SpO2 97%   BMI 22.77 kg/m²     Physical Exam  Vitals and nursing note reviewed.   Constitutional:       Appearance: Normal appearance. She is ill-appearing. She is not toxic-appearing.   HENT:      Head: Normocephalic and atraumatic.      Jaw: There is normal jaw occlusion.   Eyes:      General: Lids are normal.      Extraocular Movements: Extraocular movements intact.      Conjunctiva/sclera: Conjunctivae normal.      Pupils: Pupils are equal, round, and reactive to light.   Cardiovascular:      Rate and Rhythm: Regular rhythm. Tachycardia present.      Pulses: Normal pulses.      Heart sounds: Normal heart sounds.   Pulmonary:      Effort: Pulmonary effort is normal. Tachypnea present. No respiratory distress.      Breath sounds: Wheezing present. No rhonchi.   Abdominal:      General: Abdomen is flat.      Palpations: Abdomen is soft.      Tenderness: There is no abdominal tenderness. There is no guarding or rebound.   Musculoskeletal:         General: Normal range of motion.      Cervical back: Normal range of motion and neck supple.      Right lower leg: No edema.      Left lower leg: No edema.   Skin:     General: Skin is warm and dry.   Neurological:      Mental Status: She is alert. Mental status is at baseline.      Comments: Confused, listless "                  Procedures:  Procedures      Medical Decision Making:      Comorbidities that affect care:    Pulmonary embolism, diabetes, COPD, CHF, CKD, status post TAVR    External Notes reviewed:    None      The following orders were placed and all results were independently analyzed by me:  Orders Placed This Encounter   Procedures    Blood Culture - Blood,    Blood Culture - Blood,    COVID-19, FLU A/B, RSV PCR 1 HR TAT - Swab, Nasopharynx    Respiratory Culture - Sputum, Cough    Legionella Antigen, Urine - Urine, Urine, Clean Catch    Respiratory Panel PCR w/COVID-19(SARS-CoV-2) TANIKA/MARY/MINNIE/PAD/COR/ELLEN In-House, NP Swab in UTM/VTM, 2 HR TAT - Swab, Nasopharynx    S. Pneumo Ag Urine or CSF - Urine, Urine, Clean Catch    XR Chest 1 View    CT Angiogram Chest Pulmonary Embolism    [place] Draw    Single High Sensitivity Troponin T    Magnesium    Urinalysis With Microscopic If Indicated (No Culture) - Urine, Clean Catch    Comprehensive Metabolic Panel    Lactic Acid, Plasma    CBC Auto Differential    Blood Gas, Arterial -    Ferritin    Lactate Dehydrogenase    D-dimer, Quantitative    Procalcitonin    Comprehensive Metabolic Panel    C-reactive Protein    BNP    Blood Gas, Arterial -    Diet: Diabetic; Consistent Carbohydrate; Fluid Consistency: Thin (IDDSI 0)    Undress & Gown    Vital Signs    Orthostatic Blood Pressure    Undress & Gown    Vital Signs    Advance Diet As Tolerated -    Oral Care    Place Sequential Compression Device    Maintain Sequential Compression Device    Maintain IV Access    Telemetry - Place Orders & Notify Provider of Results When Patient Experiences Acute Chest Pain, Dysrhythmia or Respiratory Distress    Vital Signs    Activity - Ad Shena    Up in Chair    Ambulate Patient    Intake & Output    Daily Weights    Intake & Output    Continuous Pulse Oximetry    Code Status and Medical Interventions: CPR (Attempt to Resuscitate); Full Support    Inpatient Hospitalist Consult     Inpatient Case Management  Consult    Patient Isolation Contact and Airborne    Oxygen Therapy- Nasal Cannula; Titrate 1-6 LPM Per SpO2; 90 - 95%    Oxygen Therapy- Nasal Cannula; Titrate 1-6 LPM Per SpO2; 90 - 95%    Document Pulse Oximetry - On Room Air / Home O2 Level    Oxygen Therapy- Nasal Cannula; Titrate 1-6 LPM Per SpO2; 90 - 95%    Incentive Spirometry    Oscillating Positive Expiratory Pressure (OPEP)    RT to Initiate Bronchodilator Protocol    RT to Initiate Bronchopulmonary Hygiene Protocol    Oxygen Therapy- Nasal Cannula; Titrate 1-6 LPM Per SpO2; 90 - 95%    NIPPV - Provider Settings    POC Glucose Once    POC Lactate    POC Electrolyte Panel    POC Glucose Once    POC Glucose 4x Daily Before Meals & at Bedtime    POC Glucose Once    POC Lactate    POC Electrolyte Panel    ECG 12 Lead ED Triage Standing Order; Weak / Dizzy / AMS    Insert Peripheral IV    Insert Peripheral IV    Insert Peripheral IV    Access Port    Inpatient Admission    Fall Precautions    Green Top (Gel)    Lavender Top    Gold Top - SST    Light Blue Top    CBC & Differential       Medications Given in the Emergency Department:  Medications   sodium chloride 0.9 % flush 10 mL (has no administration in time range)   sodium chloride 0.9 % flush 10 mL (has no administration in time range)   acetaminophen (TYLENOL) tablet 325 mg (has no administration in time range)   cetirizine (zyrTEC) tablet 10 mg (10 mg Oral Not Given 12/7/24 1413)   Patient Supplied Pain Pump (has no administration in time range)   sodium chloride 0.9 % flush 10 mL (10 mL Intravenous Given 12/7/24 1414)   sodium chloride 0.9 % flush 10 mL (has no administration in time range)   sodium chloride 0.9 % infusion 40 mL (has no administration in time range)   calcium carbonate (TUMS) chewable tablet 500 mg (200 mg elemental) (has no administration in time range)   aluminum-magnesium hydroxide-simethicone (MAALOX MAX) 400-400-40 MG/5ML suspension 15 mL  (has no administration in time range)   famotidine (PEPCID) tablet 20 mg (20 mg Oral Not Given 12/7/24 1414)   ondansetron (ZOFRAN) injection 4 mg (has no administration in time range)   Enoxaparin Sodium (LOVENOX) syringe 40 mg (has no administration in time range)   nitroglycerin (NITROSTAT) SL tablet 0.4 mg (has no administration in time range)   Potassium Replacement - Follow Nurse / BPA Driven Protocol (has no administration in time range)   Magnesium Standard Dose Replacement - Follow Nurse / BPA Driven Protocol (has no administration in time range)   Phosphorus Replacement - Follow Nurse / BPA Driven Protocol (has no administration in time range)   Calcium Replacement - Follow Nurse / BPA Driven Protocol (has no administration in time range)   ketorolac (TORADOL) injection 30 mg (has no administration in time range)   sennosides-docusate (PERICOLACE) 8.6-50 MG per tablet 2 tablet (has no administration in time range)     And   polyethylene glycol (MIRALAX) packet 17 g (has no administration in time range)     And   bisacodyl (DULCOLAX) EC tablet 5 mg (has no administration in time range)     And   bisacodyl (DULCOLAX) suppository 10 mg (has no administration in time range)   dexAMETHasone (DECADRON) tablet 6 mg (6 mg Oral Not Given 12/7/24 1413)     Or   dexAMETHasone sodium phosphate injection 6 mg ( Intravenous Not Given:  See Alt 12/7/24 1413)   Pharmacy Consult - Remdesivir for Severe COVID-19 (Within 7 days of symptom onset) (has no administration in time range)   budesonide (PULMICORT) nebulizer solution 0.5 mg (0.5 mg Nebulization Given 12/7/24 1415)   arformoterol (BROVANA) nebulizer solution 15 mcg (15 mcg Nebulization Given 12/7/24 1415)   ipratropium-albuterol (DUO-NEB) nebulizer solution 3 mL (has no administration in time range)   dextrose (GLUTOSE) oral gel 15 g (has no administration in time range)   dextrose (D50W) (25 g/50 mL) IV injection 25 g (has no administration in time range)   glucagon  (GLUCAGEN) injection 1 mg (has no administration in time range)   Insulin Lispro (humaLOG) injection 2-7 Units (has no administration in time range)   azithromycin (ZITHROMAX) tablet 500 mg (500 mg Oral Not Given 12/7/24 1413)     Followed by   azithromycin (ZITHROMAX) tablet 250 mg (has no administration in time range)   remdesivir 200 mg in 290 mL NS (has no administration in time range)   remdesivir 100 mg in 270 mL NS (has no administration in time range)   dexAMETHasone sodium phosphate injection 8 mg (8 mg Intravenous Given 12/7/24 0858)   ipratropium-albuterol (DUO-NEB) nebulizer solution 3 mL (3 mL Nebulization Given 12/7/24 0823)   acetaminophen (TYLENOL) suppository 650 mg (650 mg Rectal Given 12/7/24 0948)   iopamidol (ISOVUE-370) 76 % injection 100 mL (100 mL Intravenous Given 12/7/24 0920)        ED Course:         Labs:    Lab Results (last 24 hours)       Procedure Component Value Units Date/Time    Single High Sensitivity Troponin T [[id number]]  (Abnormal) Collected: 12/07/24 0757    Specimen: Blood Updated: 12/07/24 0848     HS Troponin T 42 ng/L     Narrative:      High Sensitive Troponin T Reference Range:  <14.0 ng/L- Negative Female for AMI  <22.0 ng/L- Negative Male for AMI  >=14 - Abnormal Female indicating possible myocardial injury.  >=22 - Abnormal Male indicating possible myocardial injury.   Clinicians would have to utilize clinical acumen, EKG, Troponin, and serial changes to determine if it is an Acute Myocardial Infarction or myocardial injury due to an underlying chronic condition.         Magnesium [[id number]]  (Normal) Collected: 12/07/24 0757    Specimen: Blood Updated: 12/07/24 0910     Magnesium 1.7 mg/dL     Comprehensive Metabolic Panel [[id number]]  (Abnormal) Collected: 12/07/24 0757    Specimen: Blood Updated: 12/07/24 0910     Glucose 127 mg/dL      BUN 11 mg/dL      Creatinine 0.58 mg/dL      Sodium 136 mmol/L      Potassium 4.4 mmol/L      Comment: Slight hemolysis  detected by analyzer. Result may be falsely elevated.        Chloride 96 mmol/L      CO2 29.5 mmol/L      Calcium 8.8 mg/dL      Total Protein 7.2 g/dL      Albumin 3.9 g/dL      ALT (SGPT) 13 U/L      AST (SGOT) 23 U/L      Comment: Slight hemolysis detected by analyzer. Result may be falsely elevated.        Alkaline Phosphatase 62 U/L      Total Bilirubin <0.2 mg/dL      Globulin 3.3 gm/dL      A/G Ratio 1.2 g/dL      BUN/Creatinine Ratio 19.0     Anion Gap 10.5 mmol/L      eGFR 104.4 mL/min/1.73     Narrative:      GFR Normal >60  Chronic Kidney Disease <60  Kidney Failure <15      Lactic Acid, Plasma [[id number]]  (Normal) Collected: 12/07/24 0757    Specimen: Blood Updated: 12/07/24 0848     Lactate 1.4 mmol/L     Blood Culture - Blood, Arm, Right [[id number]] Collected: 12/07/24 0757    Specimen: Blood from Arm, Right Updated: 12/07/24 0822    Blood Culture - Blood, Arm, Left [[id number]] Collected: 12/07/24 0757    Specimen: Blood from Arm, Left Updated: 12/07/24 0822    COVID-19, FLU A/B, RSV PCR 1 HR TAT - Swab, Nasopharynx [[id number]]  (Abnormal) Collected: 12/07/24 0757    Specimen: Swab from Nasopharynx Updated: 12/07/24 0943     COVID19 Detected     Influenza A PCR Not Detected     Influenza B PCR Not Detected     RSV, PCR Not Detected    Narrative:      Fact sheet for providers: https://www.fda.gov/media/197280/download    Fact sheet for patients: https://www.fda.gov/media/903306/download    Test performed by PCR.    Ferritin [[id number]]  (Abnormal) Collected: 12/07/24 0757    Specimen: Blood Updated: 12/07/24 1409     Ferritin 151.20 ng/mL     Narrative:      <12 ng/mL usually associated with Iron Deficiency Anemia. Above normal range levels may be due to Hepatic and/or Chronic Inflammatory Disease.  Results may be falsely decreased if patient taking Biotin.      Lactate Dehydrogenase [[id number]]  (Abnormal) Collected: 12/07/24 0757    Specimen: Blood Updated: 12/07/24 1440      U/L      Comment:  "Specimen hemolyzed.  Results may be affected.       Procalcitonin [[id number]]  (Abnormal) Collected: 12/07/24 0757    Specimen: Blood Updated: 12/07/24 1409     Procalcitonin 0.36 ng/mL     Narrative:      As a Marker for Sepsis (Non-Neonates):    1. <0.5 ng/mL represents a low risk of severe sepsis and/or septic shock.  2. >2 ng/mL represents a high risk of severe sepsis and/or septic shock.    As a Marker for Lower Respiratory Tract Infections that require antibiotic therapy:    PCT on Admission    Antibiotic Therapy       6-12 Hrs later    >0.5                Strongly Recommended  >0.25 - <0.5        Recommended  0.1 - 0.25          Discouraged              Remeasure/reassess PCT  <0.1                Strongly Discouraged     Remeasure/reassess PCT    As 28 day mortality risk marker: \"Change in Procalcitonin Result\" (>80% or <=80%) if Day 0 (or Day 1) and Day 4 values are available. Refer to http://[email or web address] [place] [facility] [email or web address]cal[email or web address]    Change in PCT <=80%  A decrease of PCT levels below or equal to 80% defines a positive change in PCT test result representing a higher risk for 28-day all-cause mortality of patients diagnosed with severe sepsis for septic shock.    Change in PCT >80%  A decrease of PCT levels of more than 80% defines a negative change in PCT result representing a lower risk for 28-day all-cause mortality of patients diagnosed with severe sepsis or septic shock.    This test is Prognostic not Diagnostic, if elevated correlate with clinical findings before administering antibiotic treatment.        C-reactive Protein [[id number]]  (Abnormal) Collected: 12/07/24 0757    Specimen: Blood Updated: 12/07/24 1403     C-Reactive Protein 10.88 mg/dL     BNP [[id number]]  (Normal) Collected: 12/07/24 0757    Specimen: Blood Updated: 12/07/24 1409     proBNP 627.1 pg/mL     Narrative:      This assay is used as an aid in the diagnosis of individuals suspected of having heart failure. It can be used as an aid in the " diagnosis of acute decompensated heart failure (ADHF) in patients presenting with signs and symptoms of ADHF to the emergency department (ED). In addition, NT-proBNP of <300 pg/mL indicates ADHF is not likely.    Age Range Result Interpretation  NT-proBNP Concentration (pg/mL:      <50             Positive            >450                   Gray                 300-450                    Negative             <300    50-75           Positive            >900                  Gray                300-900                  Negative            <300      >75             Positive            >1800                  Gray                300-1800                  Negative            <300    POC Glucose Once [[id number]]  (Abnormal) Collected: 12/07/24 0820    Specimen: Blood Updated: 12/07/24 0833     Glucose 143 mg/dL      Comment: Serial Number: [id number]:  [id number]       Blood Gas, Arterial - [[id number]]  (Abnormal) Collected: 12/07/24 0820    Specimen: Arterial Blood Updated: 12/07/24 0833     Site Right Brachial     Shane's Test N/A     pH, Arterial 7.367 pH units      pCO2, Arterial 61.3 mm Hg      pO2, Arterial 54.7 mm Hg      HCO3, Arterial 35.2 mmol/L      Base Excess, Arterial 7.5 mmol/L      Comment: Serial Number: [id number]:  [id number]        O2 Saturation, Arterial 85.8 %      Hemoglobin, Blood Gas 14.9 g/dL      Hematocrit, Blood Gas 44.0 %      Barometric Pressure for Blood Gas 753.2000 mmHg      Modality Cannula     FIO2 36 %      Flow Rate 4.0000 lpm      Notified Who dr gilliland     Read Back Yes     Notified Time --     Hemodilution No     PO2/FIO2 152    POC Lactate [[id number]]  (Normal) Collected: 12/07/24 0820    Specimen: Arterial Blood Updated: 12/07/24 0833     Lactate 0.8 mmol/L      Comment: Serial Number: [id number]:  [id number]       POC Electrolyte Panel [[id number]]  (Abnormal) Collected: 12/07/24 0820    Specimen: Arterial Blood Updated: 12/07/24 0833     Sodium 141 mmol/L      POC Potassium 3.8 mmol/L       Chloride 97 mmol/L      Ionized Calcium 1.18 mmol/L      Comment: Serial Number: [id number]:  [id number]       POC Glucose Once [[id number]]  (Abnormal) Collected: 12/07/24 0906    Specimen: Blood Updated: 12/07/24 0907     Glucose 128 mg/dL      Comment: Serial Number: [id number]:  [id number]       CBC & Differential [[id number]]  (Abnormal) Collected: 12/07/24 0932    Specimen: Blood Updated: 12/07/24 0952    Narrative:      The following orders were created for panel order CBC & Differential.  Procedure                               Abnormality         Status                     ---------                               -----------         ------                     CBC Auto Differential[[id number]]        Abnormal            Final result                 Please view results for these tests on the individual orders.    CBC Auto Differential [[id number]]  (Abnormal) Collected: 12/07/24 0932    Specimen: Blood Updated: 12/07/24 0952     WBC 5.69 10*3/mm3      RBC 4.59 10*6/mm3      Hemoglobin 12.4 g/dL      Hematocrit 41.5 %      MCV 90.4 fL      MCH 27.0 pg      MCHC 29.9 g/dL      RDW 14.2 %      RDW-SD 47.0 fl      MPV 10.3 fL      Platelets 145 10*3/mm3      Neutrophil % 76.6 %      Lymphocyte % 9.0 %      Monocyte % 13.0 %      Eosinophil % 0.5 %      Basophil % 0.2 %      Immature Grans % 0.7 %      Neutrophils, Absolute 4.36 10*3/mm3      Lymphocytes, Absolute 0.51 10*3/mm3      Monocytes, Absolute 0.74 10*3/mm3      Eosinophils, Absolute 0.03 10*3/mm3      Basophils, Absolute 0.01 10*3/mm3      Immature Grans, Absolute 0.04 10*3/mm3      nRBC 0.0 /100 WBC     D-dimer, Quantitative [[id number]]  (Abnormal) Collected: 12/07/24 0932    Specimen: Blood Updated: 12/07/24 1354     D-Dimer, Quantitative 0.69 MCGFEU/mL     Narrative:      According to the assay 's published package insert, a normal (<0.50 MCGFEU/mL) D-dimer result in conjunction with a non-high clinical probability assessment,  "excludes deep vein thrombosis (DVT) and pulmonary embolism (PE) with high sensitivity.    D-dimer values increase with age and this can make VTE exclusion of an older population difficult. To address this, the [employer], based on best available evidence and recent guidelines, recommends that clinicians use age-adjusted D-dimer thresholds in patients greater than 50 years of age with: a) a low probability of PE who do not meet all Pulmonary Embolism Rule Out Criteria, or b) in those with intermediate probability of PE.   The formula for an age-adjusted D-dimer cut-off is \"age/100\".  For example, a 60 year old patient would have an age-adjusted cut-off of 0.60 MCGFEU/mL and an 80 year old 0.80 MCGFEU/mL.    Urinalysis With Microscopic If Indicated (No Culture) - Straight Cath [[id number]]  (Normal) Collected: 12/07/24 0941    Specimen: Urine from Straight Cath Updated: 12/07/24 0954     Color, UA Yellow     Appearance, UA Clear     pH, UA <=5.0     Specific Gravity, UA 1.028     Glucose, UA Negative     Ketones, UA Negative     Bilirubin, UA Negative     Blood, UA Negative     Protein, UA Negative     Leuk Esterase, UA Negative     Nitrite, UA Negative     Urobilinogen, UA 0.2 E.U./dL    Narrative:      Urine microscopic not indicated.    Blood Gas, Arterial - [[id number]]  (Abnormal) Collected: 12/07/24 1351    Specimen: Arterial Blood Updated: 12/07/24 1357     Site Left Radial     Shane's Test Positive     pH, Arterial 7.340 pH units      pCO2, Arterial 63.4 mm Hg      pO2, Arterial 66.1 mm Hg      HCO3, Arterial 34.2 mmol/L      Base Excess, Arterial 6.0 mmol/L      Comment: Serial Number: [id number]:  [id number]        O2 Saturation, Arterial 90.7 %      Hemoglobin, Blood Gas 15.1 g/dL      Hematocrit, Blood Gas 44.0 %      Barometric Pressure for Blood Gas 746.3000 mmHg      Modality Cannula     Flow Rate 3.5000 lpm      Notified Who dr. jain     Read Back Yes     Notified Time --     " Hemodilution No    POC Glucose Once [[id number]]  (Abnormal) Collected: 12/07/24 1351    Specimen: Arterial Blood Updated: 12/07/24 1357     Glucose 272 mg/dL      Comment: Serial Number: [id number]:  [id number]       POC Lactate [[id number]]  (Normal) Collected: 12/07/24 1351    Specimen: Arterial Blood Updated: 12/07/24 1357     Lactate 0.7 mmol/L      Comment: Serial Number: [id number]:  [id number]       POC Electrolyte Panel [[id number]]  (Abnormal) Collected: 12/07/24 1351    Specimen: Arterial Blood Updated: 12/07/24 1357     Sodium 140 mmol/L      POC Potassium 4.2 mmol/L      Chloride 97 mmol/L      Ionized Calcium 1.23 mmol/L      Comment: Serial Number: [id number]:  [id number]                Imaging:    CT Angiogram Chest Pulmonary Embolism    Result Date: 12/7/2024  CT ANGIOGRAM CHEST PULMONARY EMBOLISM Date of Exam: 12/7/2024 9:18 AM EST Indication: soa shortness of breath. Head and neck cancer. Comparison: Chest x-ray 12/7/2024, CT chest without contrast 8/22/2024 Technique: Axial CT images were obtained of the chest after the uneventful intravenous administration of iodinated contrast utilizing pulmonary embolism protocol.  Reconstructed coronal and sagittal images were also obtained. Automated exposure control and iterative construction methods were used. Findings: Pulmonary Arteries: Excellent contrast opacification of the pulmonary arteries.  No intraluminal filling defects to suggest pulmonary embolus.  The pulmonary arteries are normal in caliber. Hilum and Mediastinum: Pathologically enlarged right hilar lymph node measures 1.8 x 2.9 cm image 134. Subcarinal lymph node measures up to 1.4 cm in short axis. There are calcified hilar mediastinal lymph nodes. There is a prosthetic aortic valve. There  are annular calcifications mitral valve. Normal heart size.  No significant coronary artery atherosclerotic disease. Unremarkable thoracic aorta.   No pericardial effusion. Lung Parenchyma and Pleura:  Moderate emphysema. Scattered groundglass opacities similar to previous exam. There is a noncalcified nodule in the right lower lobe on image 59. It measures 7 mm. This is new. Calcified granuloma in the posterior lateral right lower lobe is unchanged. Previous nodularity in the right middle lobe has resolved. No focal consolidations.  No suspicious pulmonary nodules.  No endobronchial lesions.  No significant pleural effusions. Upper Abdomen: Unremarkable. Soft tissues: There is fullness in the proximal airway similar to previous PET/CT and CT chest. Recommend clinical correlation for upper airway narrowing or obstruction. There is stranding in the fat of the neck at the level of the larynx and fluid/increased density in the prevertebral tissues. Could be seen with posttreatment changes or active malignancy. Osseous structures: No aggressive focal lytic or sclerotic osseous lesions. Osteopenia.     1.No evidence of pulmonary embolus. 2. Pathologically enlarged hilar lymph node on the right. Could be reactive or neoplastic. 3.Moderate emphysema. 4.Narrowing of the airway at the level of the larynx with soft tissue edema and thickening at this location similar to previous CT chest. Question posttreatment changes or active malignancy. Correlate for signs or symptoms of upper airway obstruction. 5.New 7 mm pulmonary nodule right lower lobe. Follow-up CT chest in 3 months time is recommended. Electronically Signed: Mara Pritchard MD  12/7/2024 9:38 AM EST  Workstation ID: [id number]    XR Chest 1 View    Result Date: 12/7/2024  XR CHEST 1 VW Date of Exam: 12/7/2024 7:21 AM EST Indication: Weak/Dizzy/AMS triage protocol Comparison: CT chest from August 22, 2024 Findings: A left internal jugular port has its tip at the low SVC. The lungs are clear. The heart and mediastinal contours appear normal. There is pulmonary vascular congestion. The osseous structures appear intact.     Impression: 1.Pulmonary vascular congestion,  which could reflect mild pulmonary edema. 2.No focal pneumonia identified. Electronically Signed: Ryan Farnsworth MD  12/7/2024 7:34 AM EST  Workstation ID: [id number]       Differential Diagnosis and Discussion:    Altered Mental Status: Based on the patient's signs and symptoms, differential diagnosis includes but is not limited to meningitis, stroke, sepsis, subarachnoid hemorrhage, intracranial bleeding, encephalitis, and metabolic encephalopathy.  Dyspnea: Differential diagnosis includes but is not limited to metabolic acidosis, neurological disorders, psychogenic, asthma, pneumothorax, upper airway obstruction, COPD, pneumonia, noncardiogenic pulmonary edema, interstitial lung disease, anemia, congestive heart failure, and pulmonary embolism    All labs were reviewed and interpreted by me.  All X-rays impressions were independently interpreted by me.  CT scan radiology impression was interpreted by me.    MDM  Number of Diagnoses or Management Options  COVID-19  Shortness of breath  Diagnosis management comments: In summary this is a 59-year-old female with multiple comorbidities who presents to the emergency department for evaluation of fever, mental status changes and shortness of breath.  Patient family members in the house have tested positive for COVID-19 recently.  Chest x-ray reviewed by me is unremarkable for acute pathology.  CT brain reviewed by me is unremarkable for acute pathology.  CBC independently reviewed and interpreted by me and shows no critical abnormalities.  CMP independently reviewed and interpreted by me and shows no critical abnormalities.  COVID-19 test positive.  Patient has received breathing treatment and steroids in the emergency department but is persistently tachycardic and does have ultimately status still.  I feel it would be best for her to be admitted to the hospital.  Patient case has been discussed with the hospitalist team who will admit to the hospital for further  evaluation and continuation of treatment.                       Patient Care Considerations:    SEPSIS IS NOT PRESENT IN THE EMERGENCY DEPARTMENT: Patient meets SIRS criteria in the emergency department however the patient does not have a known source of bacterial infection to confirm the diagnosis of sepsis.        Consultants/Shared Management Plan:    Hospitalist: I have discussed the case with Dr. Rose who agrees to accept the patient for admission.    Social Determinants of Health:    Patient has presented with family members who are responsible, reliable and will ensure follow up care.      Disposition and Care Coordination:    Admit:   Through independent evaluation of the patient's history, physical, and imperical data, the patient meets criteria for inpatient admission to the hospital.        Final diagnoses:   Shortness of breath   COVID-19        ED Disposition       ED Disposition   Decision to Admit    Condition   --    Comment   Level of Care: Telemetry [5]   Diagnosis: COVID-19 virus infection [[id number]]   Admitting Physician: MORA ROSE [[id number]]   Attending Physician: MORA ROSE [[id number]]   Isolate for COVID?: No [0]   Certification: I Certify That Inpatient Hospital Services Are Medically Necessary For Greater Than 2 Midnights                 This medical record created using voice recognition software.             Sebastian Gunn MD  12/07/24 1522

## 2024-12-07 NOTE — H&P
[facility]   HOSPITALIST HISTORY AND PHYSICAL  Date: 2024   Patient Name: Isha Llanes  : 1965  MRN: [id number]  Primary Care Physician:  Virgil Salas MD  Date of admission: 2024    Subjective   Subjective     Chief Complaint: Shortness of air and increased lethargy started yesterday    HPI:    Isha Llanes is a 59 y.o. 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 Mancuso and Dr. Zelaya, Hodgkin lymphoma, Dilaudid pain pump for cancer, lupus on chronic prednisone, anxiety disorder, CAD, neuropathy, diabetes mellitus, hypertension, aortic stenosis s/p TAVR, aspiration, chronic hypoxemia as needed home oxygen use, chronic hoarseness and bedridden with contractures of lower extremities  Patient presented with increasing shortness of air and lethargy to [facility] ED.  History is obtained by talking to ED physician to be in the record and daughter at bedside as patient is lethargic.  Patient does wake up to vocal response but goes back to sleep her voice is very hoarse and difficult to comprehend, this is chronic per daughter.  Patient has been having fever along with cough.  Also has mild diarrhea.  Patient has not been vaccinated for COVID.  Workup in the ED showed temperature of 103 heart rate of 132 with soft blood pressure.  91% saturation on 4 L.  ABG showed pH of 7.36, pCO2 of 61, pO2 of 54, O2 sat 86% on 4 L.  CMP is negative.  Lactate is negative.  CBC is negative.  UA is negative.  EKG shows sinus tachycardia 122.  CTA of the chest does not show any PE does have right hilar lymphadenopathy reactive versus neoplastic, narrowing of larynx with edema and thickening similar to previous PET CT from .  Also has new 7 mm right lower lobe pulm nodule.  COVID PCR is positive.  Hospitalist has been called to admit her for further management.      Personal History     Past Medical  History:  Past Medical History:   Diagnosis Date    Anesthesia     REPORTS HAS GOT REAL EMOTIONAL AND HAS BECOME ANGRY BEFORE    Anxiety     Aortic stenosis, severe     HAS BIO VALVE REPLACED    Arthritis     Asthma     Back pain     Blood clotting disorder     Cancer     Cancer associated pain     HODGKINS LYMPHOMA    CHF (congestive heart failure)     Chronic low back pain with sciatica     Chronic pain disorder     COPD (chronic obstructive pulmonary disease)     Coronary artery disease     Diabetes mellitus     TYPE 2    Diabetes mellitus     Dyspnea on effort     GERD (gastroesophageal reflux disease)     H/O lumpectomy     H/O: hysterectomy     Hiatal hernia     History of degenerative disc disease     History of kidney stones     History of pulmonary embolus (PE)     History of transfusion     AS A CHILD NO TRANSFUSION REACTION    History of transfusion     Hodgkin's lymphoma     5/19/23  5 YEARS SINCE LAST CHEMO TX    Hypertension     Immobility     Liver disease     DENIES ANY CURRENT ISSUES    Lupus     Mitral valve prolapse     Nausea vomiting and diarrhea 3/7/2024    Panic disorder     Pelvic pain     Peripheral neuropathy     Personal history of DVT (deep vein thrombosis)     Presence of intrathecal pump     PTSD (post-traumatic stress disorder)     Sacroiliac joint disease     SI (sacroiliac) joint inflammation     Sleep apnea     Venous insufficiency        Past Surgical History:  Past Surgical History:   Procedure Laterality Date    ABDOMINAL SURGERY      BACK SURGERY      SPINAL FUSION     BACK SURGERY      BLADDER REPAIR      CARDIAC CATHETERIZATION N/A 03/06/2019    Procedure: Valvuloplasty;  Surgeon: Wayne Johnson MD;  Location: [facility] CATH INVASIVE LOCATION;  Service: Cardiology    CARDIAC CATHETERIZATION      CARDIAC CATHETERIZATION Left 5/31/2023    Procedure: Cardiac Catheterization/Vascular Study;  Surgeon: Poncho Reid MD;  Location: [facility] CATH INVASIVE LOCATION;   Service: Cardiovascular;  Laterality: Left;    CARDIAC SURGERY      CARDIAC VALVE REPLACEMENT  2021    CHOLECYSTECTOMY      COLONOSCOPY N/A 12/29/2021    Procedure: COLONOSCOPY;  Surgeon: Karine Orlando MD;  Location: [facility] ENDOSCOPY;  Service: Gastroenterology;  Laterality: N/A;  POOR PREP    COLONOSCOPY N/A 04/13/2022    Procedure: COLONOSCOPY WITH POLYPECTOMY;  Surgeon: Karine Orlando MD;  Location: [facility] ENDOSCOPY;  Service: Gastroenterology;  Laterality: N/A;  COLON POLYP, HEMORRHOIDS, POOR PREP    COLONOSCOPY      DIRECT LARYNGOSCOPY, ESOPHAGOSCOPY, BRONCHOSCOPY N/A 5/22/2023    Procedure: DIRECT LARYNGOSCOPY WITH BIOPSIES , ESOPHAGOSCOPY, BRONCHOSCOPY;  Surgeon: Ronnie Mcgarry MD;  Location: [facility] OR [facility]SC;  Service: ENT;  Laterality: N/A;    ENDOSCOPY N/A 12/29/2021    Procedure: ESOPHAGOGASTRODUODENOSCOPY;  Surgeon: Karine Orlando MD;  Location: [facility] ENDOSCOPY;  Service: Gastroenterology;  Laterality: N/A;  ESOPHAGITIS, GASTRITIS, HIATAL HERNIA    ENDOSCOPY      ENDOSCOPY N/A 4/16/2024    Procedure: ESOPHAGOGASTRODUODENOSCOPY WITH BIOPSIES;  Surgeon: Karine Orlando MD;  Location: [facility] ENDOSCOPY;  Service: Gastroenterology;  Laterality: N/A;  ESOPHAGITIS, HIATAL HERNIA    HYSTERECTOMY      LYMPHADENECTOMY      PAIN PUMP INSERTION/REVISION N/A 10/18/2019    Procedure: PAIN PUMP INSERTION [facility] [place] [facility] [place];  Surgeon: Horace Rios MD;  Location: [facility];  Service: Pain Management    PAIN PUMP INSERTION/REVISION N/A 11/23/2020    Procedure: pain pump removal;  Surgeon: Horace Rios MD;  Location: [facility] OR;  Service: Pain Management;  Laterality: N/A;    PEG TUBE INSERTION N/A 7/26/2023    Procedure: PERCUTANEOUS ENDOSCOPIC GASTROSTOMY TUBE INSERTION;  Surgeon: Kody Mercer MD;  Location: [facility] ENDOSCOPY;  Service: General;  Laterality: N/A;  SUCCESSFUL PEG TUBE PLACE PLACEMENT    PORTACATH PLACEMENT      IN LEFT CHEST REPORTS  THAT IT IS NOT FUNCTIONING    SKIN BIOPSY      TONSILLECTOMY      TUBAL ABDOMINAL LIGATION      TUMOR REMOVAL         Family History:   family history includes ADD / ADHD in her brother, granddaughter, grandson, and nephew; Anxiety disorder in her mother; Bipolar disorder in her sister; Depression in her sister; Heart failure in her mother; Pancreatic cancer in her paternal grandmother and sister; Prostate cancer in her father; Thyroid cancer in her maternal grandmother.    Social History:    reports that she has been smoking cigarettes. She started smoking about 45 years ago. She has a 68.9 pack-year smoking history. She has been exposed to tobacco smoke. She has never used smokeless tobacco. She reports that she does not drink alcohol and does not use drugs.    Home Medications:  ALPRAZolam, acetaminophen, albuterol sulfate HFA, cetirizine, clopidogrel, mupirocin, pain, and predniSONE    Allergies:  Allergies   Allergen Reactions    Amoxicillin Shortness Of Breath     Has tolerated Cefazolin, Ceftriaxone, and Cefepime -Jermaine Melida, RPH    Ceclor [Cefaclor] Shortness Of Breath     Has tolerated Cefazolin, Ceftriaxone, and Cefepime -Jermaine Melida, RPH      Penicillins Shortness Of Breath     Has tolerated Cefazolin, Ceftriaxone, and Cefepime -Jermaine Melida, RPH    Sulfa Antibiotics Rash    Valium [Diazepam] Mental Status Change     DEPRESSED    Ambien [Zolpidem] Mental Status Change    Aspirin GI Intolerance    Ativan [Lorazepam] Mental Status Change    Benadryl [Diphenhydramine] Anxiety     AND MAKES HER HYPER    Biaxin [Clarithromycin] Unknown - Low Severity    Cephalexin Unknown - Low Severity    Clindamycin Unknown - Low Severity    Compazine [Prochlorperazine Edisylate] Rash    Contrast Dye (Echo Or Unknown Ct/Mr) Other (See Comments)     Caused pain in her arm    Doxycycline Rash    Nsaids GI Intolerance    Phenergan [Promethazine Hcl] GI Intolerance    Promethazine GI Intolerance    Vancomycin  Itching       Review of Systems   All systems were reviewed and negative except for: As per H&P Limited as patient lethargic and very hoarse    Objective   Objective     Vitals:   Temp:  [98.5 °F (36.9 °C)-103.7 °F (39.8 °C)] 98.5 °F (36.9 °C)  Heart Rate:  [] 114  Resp:  [17-30] 17  BP: ()/(61-94) 109/69  Flow (L/min) (Oxygen Therapy):  [4] 4    Physical Exam    Constitutional: Lethargic using abdominal and accessory muscles   eyes: Pupils equal, sclerae anicteric, no conjunctival injection   HENT: NCAT, mucous membranes dry   Neck: Supple, no thyromegaly, no lymphadenopathy, trachea midline   Respiratory: Occasional wheezing and decreased to auscultation bilaterally, positive labored respirations    Cardiovascular: Left upper chest port with a healed scar, tachycardic, 2/6 systolic murmurs, rubs, or gallops, palpable pedal pulses bilaterally   Gastrointestinal: Positive bowel sounds, soft, nontender, nondistended   Musculoskeletal: Chronic +1-2 bilateral ankle edema, no clubbing or cyanosis to extremities   Psychiatric: Not appropriate affect, cooperative   Neurologic: Oriented x 1 knows in hospital,  lower extremity contractures not able to straighten her legs, Cranial Nerves grossly intact to confrontation, speech not clear   Skin: Chronic stasis changes bilateral lower extremities    Result Review    Result Review:  I have personally reviewed the results from the time of this admission to 12/7/2024 15:39 EST and agree with these findings:  [x]  Laboratory  [x]  Microbiology  [x]  Radiology  [x]  EKG/Telemetry sinus tachycardia 122, IVCD, QT 0.44  []  Cardiology/Vascular troponin 42  []  Pathology  [x]  Old records  [x]  Other: Medications      Assessment & Plan   Assessment / Plan     Assessment:  Acute on chronic hypoxemic respiratory failure patient needing NIPPV due to use of accessory and abdominal muscles  COVID-19 pneumonia.  COPD exacerbation.  Chronic hoarseness, dysphagia and  aspiration.  History of supraglottic squamous cell cancer of larynx status post XRT.  Chronic narrowing of larynx with edema and thickening  Bedridden and chronic contractures of lower extremities.  Sepsis as patient has fever, tachycardia and hypoxemia.  Right hilar lymphadenopathy reactive versus neoplastic.  New 7 mm right lower lobe pulmonary nodule.  Need repeat CT chest in 3 months.  Aortic stenosis status post TAVR.  Diabetes mellitus.  Neuropathy.  Lupus on chronic prednisone.  Chronic pain from cancer on Dilaudid pump.  History of Hodgkin's lymphoma.  Hypertension.  Anxiety disorder.    Plan:   Continue supplemental oxygen keep sats more than 90%.  NIPPV as needed.  Repeat ABG noted mild hypercapnia.  Decadron.  Remdesivir.  Zithromax.  Respiratory culture.  Legionella and strep pneumonia antigen.  MRSA PCR  DuoNeb, Pulmicort brovana neb.  Bronchodilator and bronchopulmonary hygiene protocol.  If patient remains lethargic will get CT of the chest.  Patient does follow commands appropriately.  Incentive spirometry and flutter valve.  Speech therapy evaluation.  Nectar thick puréed diet.  Sliding-scale insulin.  Check procalcitonin and BNP.  PT OT.  Resume appropriate home medications.  Telemetry  Discussed with the ED physician nursing staff.  Discussed with daughter at bedside.  Patient is full code             VTE Prophylaxis:  Pharmacologic & mechanical VTE prophylaxis orders are present.  Lovenox    CODE STATUS:    Code Status (Patient has no pulse and is not breathing): CPR (Attempt to Resuscitate)  Medical Interventions (Patient has pulse or is breathing): Full Support      Admission Status:  I believe this patient meets inpatient status.    Part of this note may be an electronic transcription/translation of spoken language to printed text using the Dragon Dictation System.     Electronically signed by Isaias Kay MD, 12/07/24, 3:39 PM EST.

## 2024-12-08 LAB
ALBUMIN SERPL-MCNC: 3.3 G/DL (ref 3.5–5.2)
ALBUMIN/GLOB SERPL: 1.2 G/DL
ALP SERPL-CCNC: 53 U/L (ref 39–117)
ALT SERPL W P-5'-P-CCNC: 11 U/L (ref 1–33)
ANION GAP SERPL CALCULATED.3IONS-SCNC: 8.1 MMOL/L (ref 5–15)
AST SERPL-CCNC: 20 U/L (ref 1–32)
BILIRUB SERPL-MCNC: 0.2 MG/DL (ref 0–1.2)
BUN SERPL-MCNC: 15 MG/DL (ref 6–20)
BUN/CREAT SERPL: 33.3 (ref 7–25)
CALCIUM SPEC-SCNC: 8.7 MG/DL (ref 8.6–10.5)
CHLORIDE SERPL-SCNC: 100 MMOL/L (ref 98–107)
CO2 SERPL-SCNC: 31.9 MMOL/L (ref 22–29)
CREAT SERPL-MCNC: 0.45 MG/DL (ref 0.57–1)
EGFRCR SERPLBLD CKD-EPI 2021: 111 ML/MIN/1.73
GLOBULIN UR ELPH-MCNC: 2.8 GM/DL
GLUCOSE BLDC GLUCOMTR-MCNC: 148 MG/DL (ref 70–99)
GLUCOSE BLDC GLUCOMTR-MCNC: 162 MG/DL (ref 70–99)
GLUCOSE BLDC GLUCOMTR-MCNC: 221 MG/DL (ref 70–99)
GLUCOSE SERPL-MCNC: 91 MG/DL (ref 65–99)
POTASSIUM SERPL-SCNC: 4.4 MMOL/L (ref 3.5–5.2)
PROT SERPL-MCNC: 6.1 G/DL (ref 6–8.5)
SODIUM SERPL-SCNC: 140 MMOL/L (ref 136–145)

## 2024-12-08 PROCEDURE — 82948 REAGENT STRIP/BLOOD GLUCOSE: CPT

## 2024-12-08 PROCEDURE — 25810000003 SODIUM CHLORIDE 0.9 % SOLUTION: Performed by: INTERNAL MEDICINE

## 2024-12-08 PROCEDURE — 25810000003 LACTATED RINGERS SOLUTION: Performed by: FAMILY MEDICINE

## 2024-12-08 PROCEDURE — 25010000002 KETOROLAC TROMETHAMINE PER 15 MG: Performed by: INTERNAL MEDICINE

## 2024-12-08 PROCEDURE — 87899 AGENT NOS ASSAY W/OPTIC: CPT | Performed by: INTERNAL MEDICINE

## 2024-12-08 PROCEDURE — 94664 DEMO&/EVAL PT USE INHALER: CPT

## 2024-12-08 PROCEDURE — 99233 SBSQ HOSP IP/OBS HIGH 50: CPT | Performed by: INTERNAL MEDICINE

## 2024-12-08 PROCEDURE — 94799 UNLISTED PULMONARY SVC/PX: CPT

## 2024-12-08 PROCEDURE — 80053 COMPREHEN METABOLIC PANEL: CPT | Performed by: INTERNAL MEDICINE

## 2024-12-08 PROCEDURE — 25010000002 DEXAMETHASONE SODIUM PHOSPHATE 10 MG/ML SOLUTION: Performed by: INTERNAL MEDICINE

## 2024-12-08 PROCEDURE — 25010000002 ENOXAPARIN PER 10 MG: Performed by: INTERNAL MEDICINE

## 2024-12-08 PROCEDURE — 87449 NOS EACH ORGANISM AG IA: CPT | Performed by: INTERNAL MEDICINE

## 2024-12-08 PROCEDURE — 63710000001 INSULIN LISPRO (HUMAN) PER 5 UNITS: Performed by: INTERNAL MEDICINE

## 2024-12-08 PROCEDURE — 25010000002 MORPHINE PER 10 MG: Performed by: FAMILY MEDICINE

## 2024-12-08 PROCEDURE — 25010000002 REMDESIVIR 100 MG RECONSTITUTED SOLUTION: Performed by: INTERNAL MEDICINE

## 2024-12-08 RX ORDER — ALPRAZOLAM 0.25 MG/1
0.5 TABLET ORAL 2 TIMES DAILY PRN
Status: DISCONTINUED | OUTPATIENT
Start: 2024-12-08 | End: 2024-12-10

## 2024-12-08 RX ORDER — MIDODRINE HYDROCHLORIDE 10 MG/1
10 TABLET ORAL ONCE
Status: COMPLETED | OUTPATIENT
Start: 2024-12-08 | End: 2024-12-08

## 2024-12-08 RX ORDER — MIDODRINE HYDROCHLORIDE 10 MG/1
10 TABLET ORAL EVERY 8 HOURS
Status: DISCONTINUED | OUTPATIENT
Start: 2024-12-08 | End: 2024-12-09

## 2024-12-08 RX ORDER — MORPHINE SULFATE 2 MG/ML
2 INJECTION, SOLUTION INTRAMUSCULAR; INTRAVENOUS ONCE
Status: COMPLETED | OUTPATIENT
Start: 2024-12-08 | End: 2024-12-08

## 2024-12-08 RX ORDER — NICOTINE 21 MG/24HR
1 PATCH, TRANSDERMAL 24 HOURS TRANSDERMAL
Status: DISCONTINUED | OUTPATIENT
Start: 2024-12-08 | End: 2024-12-12 | Stop reason: HOSPADM

## 2024-12-08 RX ADMIN — MORPHINE SULFATE 1 MG: 2 INJECTION, SOLUTION INTRAMUSCULAR; INTRAVENOUS at 00:22

## 2024-12-08 RX ADMIN — SODIUM CHLORIDE 100 MG: 9 INJECTION, SOLUTION INTRAVENOUS at 14:54

## 2024-12-08 RX ADMIN — ALPRAZOLAM 0.5 MG: 0.25 TABLET ORAL at 14:54

## 2024-12-08 RX ADMIN — BUDESONIDE 0.5 MG: 0.5 INHALANT RESPIRATORY (INHALATION) at 20:17

## 2024-12-08 RX ADMIN — INSULIN LISPRO 2 UNITS: 100 INJECTION, SOLUTION INTRAVENOUS; SUBCUTANEOUS at 20:46

## 2024-12-08 RX ADMIN — ARFORMOTEROL TARTRATE 15 MCG: 15 SOLUTION RESPIRATORY (INHALATION) at 20:16

## 2024-12-08 RX ADMIN — ARFORMOTEROL TARTRATE 15 MCG: 15 SOLUTION RESPIRATORY (INHALATION) at 09:35

## 2024-12-08 RX ADMIN — NICOTINE 1 PATCH: 21 PATCH, EXTENDED RELEASE TRANSDERMAL at 17:57

## 2024-12-08 RX ADMIN — ACETAMINOPHEN 325 MG: 325 TABLET ORAL at 20:46

## 2024-12-08 RX ADMIN — ENOXAPARIN SODIUM 40 MG: 100 INJECTION SUBCUTANEOUS at 09:47

## 2024-12-08 RX ADMIN — KETOROLAC TROMETHAMINE 30 MG: 30 INJECTION, SOLUTION INTRAMUSCULAR; INTRAVENOUS at 09:40

## 2024-12-08 RX ADMIN — Medication 10 ML: at 09:44

## 2024-12-08 RX ADMIN — FAMOTIDINE 20 MG: 20 TABLET ORAL at 09:44

## 2024-12-08 RX ADMIN — INSULIN LISPRO 3 UNITS: 100 INJECTION, SOLUTION INTRAVENOUS; SUBCUTANEOUS at 17:56

## 2024-12-08 RX ADMIN — MIDODRINE HYDROCHLORIDE 10 MG: 10 TABLET ORAL at 14:54

## 2024-12-08 RX ADMIN — SODIUM CHLORIDE, SODIUM LACTATE, POTASSIUM CHLORIDE, CALCIUM CHLORIDE 250 ML: 20; 30; 600; 310 INJECTION, SOLUTION INTRAVENOUS at 05:47

## 2024-12-08 RX ADMIN — MIDODRINE HYDROCHLORIDE 10 MG: 10 TABLET ORAL at 05:45

## 2024-12-08 RX ADMIN — AZITHROMYCIN DIHYDRATE 250 MG: 250 TABLET ORAL at 09:47

## 2024-12-08 RX ADMIN — CETIRIZINE HYDROCHLORIDE 10 MG: 10 TABLET, FILM COATED ORAL at 09:44

## 2024-12-08 RX ADMIN — MIDODRINE HYDROCHLORIDE 10 MG: 10 TABLET ORAL at 20:46

## 2024-12-08 RX ADMIN — KETOROLAC TROMETHAMINE 30 MG: 30 INJECTION, SOLUTION INTRAMUSCULAR; INTRAVENOUS at 17:58

## 2024-12-08 RX ADMIN — BUDESONIDE 0.5 MG: 0.5 INHALANT RESPIRATORY (INHALATION) at 09:35

## 2024-12-08 RX ADMIN — Medication 10 ML: at 20:47

## 2024-12-08 RX ADMIN — MORPHINE SULFATE 2 MG: 2 INJECTION, SOLUTION INTRAMUSCULAR; INTRAVENOUS at 23:03

## 2024-12-08 RX ADMIN — SODIUM CHLORIDE, SODIUM LACTATE, POTASSIUM CHLORIDE, CALCIUM CHLORIDE 250 ML: 20; 30; 600; 310 INJECTION, SOLUTION INTRAVENOUS at 00:26

## 2024-12-08 RX ADMIN — DEXAMETHASONE SODIUM PHOSPHATE 6 MG: 10 INJECTION INTRAMUSCULAR; INTRAVENOUS at 09:43

## 2024-12-08 NOTE — PLAN OF CARE
Goal Outcome Evaluation:              Outcome Evaluation: Patient alert and oriented x4, when patient is sleeping she is extremely difficult to arouse and orient. Pt. wore bipap at beginning of shift changing over to Nasal Cannula. Pt. complained of lower back pain which was treated per MAR. Continue care per Plan of Care.

## 2024-12-08 NOTE — PLAN OF CARE
Problem: Skin Injury Risk Increased  Goal: Skin Health and Integrity  Outcome: Progressing   Goal Outcome Evaluation:         Pt showing no signs of redness of skin breakdown on forehead, nose, cheeks, mouth, chin, or ears.

## 2024-12-08 NOTE — PLAN OF CARE
Goal Outcome Evaluation:           Progress: improving  Outcome Evaluation: Patient A+Ox4.  By end of shift pain and anxiety being managed fair with new/changed medications.  MD has approved for patient to have device on hand for patient to have self administer bolus via her inplanted pain pump. Patient is resting comfortable in bed with O2 sat between 95-97% on 3L/NC.  Patient pulled out her PIV by accident, she requested to have her port accessed, port accessed x 1 attempt with 20g 3/4 inch Johnson needle, blood return, flushed without difficulty. Patient drinking well but refusing to eat current diet ordered, states she doesn't like it.  Patient was agreeable to all care today until end of shift patient was asleep and I requested RT to place her on bipap and patient woke up refusing stating that she was not asleep.

## 2024-12-08 NOTE — PROGRESS NOTES
RT EQUIPMENT DEVICE RELATED - SKIN ASSESSMENT    RT Medical Equipment/Device:     NIV Mask:  Full-face    size: s    Skin Assessment:      Cheek:  Intact  Chin:  Intact  Forehead:  Intact  Nose:  Intact  Mouth:  Intact    Device Skin Pressure Protection:  Pressure points protected    Nurse Notification:  Zayda Umanzor, RRT

## 2024-12-08 NOTE — PROGRESS NOTES
Respiratory Therapist Broncho-Pulmonary Hygiene Progress Note      Patient Name:  Isha Llanes  YOB: 1965    Isha Llanes meets the qualification for Level 1 of the Bronco-Pulmonary Hygiene Protocol. This was based on my daily patient assessment and includes review of chest x-ray results, cough ability and quality, oxygenation, secretions or risk for secretion development and patient mobility.     Broncho-Pulmonary Hygiene Assessment:    Level of Movement: Actively changing positions without assistance  Alert/ oriented/ cooperative    Breath Sounds: Clear to slightly diminished    Cough: Ineffective, weak, frequent    Chest X-Ray: Possible signs of consolidation and/or atelectasis or clear.     Sputum Productions: None or small amount of thin or watery secretions with effective cough    History and Physical: New onset of bronchitis or existing chronic pulmonary conditions.  **(not in an exacerbation)    SpO2 to Oxygen Need: greater than 92% on room air or  less than 3L nasal canula    Current SpO2 is: 96 on 3LNC    Based on this information, I have completed the following interventions: Aerobika with bronchodialtor medication or TID      Electronically signed by Simran Georges, Respiratory Tech Student, 12/08/24, 11:08 AM EST.

## 2024-12-08 NOTE — PLAN OF CARE
Goal Outcome Evaluation:         Bipap only worn for a couple hours this shift. Will continue to encourage bipap use.

## 2024-12-08 NOTE — PROGRESS NOTES
[facility]   Hospitalist Progress Note  Date: 2024  Patient Name: Isha Llanes  : 1965  MRN: [id number]  Date of admission: 2024      Subjective   Subjective     Chief Complaint: Shortness of air and increased lethargy started a day before admission.    Summary:   Isha Llanes is a 59 y.o. 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 Mancuso and Dr. Zelaya, Hodgkin lymphoma, Dilaudid pain pump for cancer, lupus on chronic prednisone, anxiety disorder, CAD, neuropathy, diabetes mellitus, hypertension, aortic stenosis s/p TAVR, aspiration, chronic hypoxemia as needed home oxygen use, chronic hoarseness and bedridden with contractures of lower extremities  Patient presented with increasing shortness of air and lethargy to [facility] ED.  History is obtained by talking to ED physician to be in the record and daughter at bedside as patient is lethargic.  Patient does wake up to vocal response but goes back to sleep her voice is very hoarse and difficult to comprehend, this is chronic per daughter.  Patient has been having fever along with cough.  Also has mild diarrhea.  Patient has not been vaccinated for COVID.  Workup in the ED showed temperature of 103 heart rate of 132 with soft blood pressure.  91% saturation on 4 L.  ABG showed pH of 7.36, pCO2 of 61, pO2 of 54, O2 sat 86% on 4 L.  CMP is negative.  Lactate is negative.  CBC is negative.  UA is negative.  EKG shows sinus tachycardia 122.  CTA of the chest does not show any PE does have right hilar lymphadenopathy reactive versus neoplastic, narrowing of larynx with edema and thickening similar to previous PET CT from .  Also has new 7 mm right lower lobe pulm nodule.  COVID PCR is positive.  Hospitalist has been called to admit her for further management.    Interval Followup:   Blood pressure soft.  Patient denies any dizziness.  Per  patient she runs low blood pressure  Tmax 101  Patient on 3 L now with 96% saturation.  Off NIPPV.  Patient is awake alert oriented x 3.  Patient much more comfortable and not using accessory muscles anymore , continues to have shortness of air  Continues to have cough not able to bring up mucus.  Denies any chest pain.  Complaining of anxiety and pain in her heels.    Review of Systems   All systems were reviewed and negative except for: Summary and interval follow-up    Objective   Objective     Vitals:   Temp:  [96.8 °F (36 °C)-97.6 °F (36.4 °C)] 96.8 °F (36 °C)  Heart Rate:  [75-94] 78  Resp:  [15-18] 16  BP: ()/(56-70) 116/56  Flow (L/min) (Oxygen Therapy):  [3-4] 3  Physical Exam    Constitutional: Awake, alert, no acute distress   Eyes: Pupils equal, sclerae anicteric, no conjunctival injection   HENT: NCAT, mucous membranes moist   Neck: Supple, no thyromegaly, no lymphadenopathy, trachea midline   Respiratory: Decreased to auscultation bilaterally, nonlabored respirations    Cardiovascular: Left upper port chest with a healed scar, RRR, 2/6 murmurs, rubs, or gallops, palpable pedal pulses bilaterally   Gastrointestinal: Positive bowel sounds, soft, nontender, nondistended   Musculoskeletal: Chronic +1 bilateral ankle edema, no clubbing or cyanosis to extremities   Psychiatric: Appropriate affect, cooperative   Neurologic: Oriented x 3, contracture of lower extremities, Cranial Nerves grossly intact to confrontation, speech clear although chronically hoarse   Skin: Chronic stasis changes bilateral lower extremities    Result Review    Result Review:  I have personally reviewed the results for the past 24 hours and agree with these findings:  [x]  Laboratory  [x]  Microbiology  [x]  Radiology  [x]  EKG/Telemetry EKG reviewed ST segment changes same as previous EKGs.  []  Cardiology/Vascular   []  Pathology  [x]  Old records  [x]  Other: Medications    Assessment & Plan   Assessment / Plan      Assessment:  Acute metabolic encephalopathy due to COVID-pneumonia.  Resolved  Acute on chronic hypoxemic respiratory failure patient needing NIPPV due to use of accessory and abdominal muscles.  Improving  COVID-19 pneumonia.  COPD exacerbation.  Chronic hoarseness, dysphagia and aspiration.  History of supraglottic squamous cell cancer of larynx status post XRT.  Chronic narrowing of larynx with edema and thickening  Bedridden and chronic contractures of lower extremities.  Sepsis as patient has fever, tachycardia and hypoxemia.  Right hilar lymphadenopathy reactive versus neoplastic.  New 7 mm right lower lobe pulmonary nodule.  Need repeat CT chest in 3 months.  Aortic stenosis status post TAVR.  Diabetes mellitus.  Neuropathy.  Lupus on chronic prednisone.  Chronic pain from cancer on Dilaudid pump.  History of Hodgkin's lymphoma.  Hypertension.  Anxiety disorder.     Plan:   Continue supplemental oxygen keep sats more than 90%.  NIPPV as needed.  Midodrine  Decadron day 2.  Remdesivir.  Zithromax.  Respiratory culture.  Legionella and strep pneumonia antigen.  Negative MRSA PCR  DuoNeb, Pulmicort brovana neb.  Bronchodilator and bronchopulmonary hygiene protocol.  Incentive spirometry and flutter valve.  Speech therapy evaluation.  Nectar thick puréed diet.  Patient is noncompliant at home  Sliding-scale insulin.  Lactic acid, procalcitonin and BNP negative  PT OT.  Resumed appropriate home medications.  Home pain pump and as needed Xanax  Nicotine patch  Continue telemetry      Discussed plan with RN.  Discharge home in stable    VTE Prophylaxis:  Pharmacologic & mechanical VTE prophylaxis orders are present.        CODE STATUS:   Code Status (Patient has no pulse and is not breathing): CPR (Attempt to Resuscitate)  Medical Interventions (Patient has pulse or is breathing): Full Support      Part of this note may be an electronic transcription/translation of spoken language to printed text using the  Dragon Dictation System.     Electronically signed by Isaias Kay MD, 12/08/24, 4:54 PM EST.

## 2024-12-08 NOTE — PLAN OF CARE
Goal Outcome Evaluation:  Plan of Care Reviewed With: patient           Outcome Evaluation: Patient arouses to stimulation/pain. Currently on bipap. Stat EKG and trop ordered d/t rhythm changes. Continuing with plan of care

## 2024-12-08 NOTE — PLAN OF CARE
Goal Outcome Evaluation:            Pt progressing on BiPap showing no signs of increased WOB. Sats currently are 96 on 3LNC.

## 2024-12-09 ENCOUNTER — APPOINTMENT (OUTPATIENT)
Dept: GENERAL RADIOLOGY | Facility: HOSPITAL | Age: 59
End: 2024-12-09
Payer: COMMERCIAL

## 2024-12-09 LAB
ALBUMIN SERPL-MCNC: 3 G/DL (ref 3.5–5.2)
ALBUMIN/GLOB SERPL: 1.1 G/DL
ALP SERPL-CCNC: 47 U/L (ref 39–117)
ALT SERPL W P-5'-P-CCNC: 13 U/L (ref 1–33)
ANION GAP SERPL CALCULATED.3IONS-SCNC: 10.2 MMOL/L (ref 5–15)
AST SERPL-CCNC: 21 U/L (ref 1–32)
BILIRUB SERPL-MCNC: 0.2 MG/DL (ref 0–1.2)
BUN SERPL-MCNC: 20 MG/DL (ref 6–20)
BUN/CREAT SERPL: 38.5 (ref 7–25)
CALCIUM SPEC-SCNC: 8.4 MG/DL (ref 8.6–10.5)
CHLORIDE SERPL-SCNC: 102 MMOL/L (ref 98–107)
CO2 SERPL-SCNC: 28.8 MMOL/L (ref 22–29)
CREAT SERPL-MCNC: 0.52 MG/DL (ref 0.57–1)
EGFRCR SERPLBLD CKD-EPI 2021: 107.2 ML/MIN/1.73
GLOBULIN UR ELPH-MCNC: 2.8 GM/DL
GLUCOSE BLDC GLUCOMTR-MCNC: 112 MG/DL (ref 70–99)
GLUCOSE BLDC GLUCOMTR-MCNC: 239 MG/DL (ref 70–99)
GLUCOSE SERPL-MCNC: 131 MG/DL (ref 65–99)
L PNEUMO1 AG UR QL IA: NEGATIVE
POTASSIUM SERPL-SCNC: 4.2 MMOL/L (ref 3.5–5.2)
PROT SERPL-MCNC: 5.8 G/DL (ref 6–8.5)
S PNEUM AG SPEC QL LA: NEGATIVE
SODIUM SERPL-SCNC: 141 MMOL/L (ref 136–145)

## 2024-12-09 PROCEDURE — 25810000003 LACTATED RINGERS PER 1000 ML: Performed by: INTERNAL MEDICINE

## 2024-12-09 PROCEDURE — 25810000003 SODIUM CHLORIDE 0.9 % SOLUTION: Performed by: INTERNAL MEDICINE

## 2024-12-09 PROCEDURE — 94761 N-INVAS EAR/PLS OXIMETRY MLT: CPT

## 2024-12-09 PROCEDURE — 25010000002 KETOROLAC TROMETHAMINE PER 15 MG: Performed by: INTERNAL MEDICINE

## 2024-12-09 PROCEDURE — 25010000002 REMDESIVIR 100 MG RECONSTITUTED SOLUTION: Performed by: INTERNAL MEDICINE

## 2024-12-09 PROCEDURE — 94799 UNLISTED PULMONARY SVC/PX: CPT

## 2024-12-09 PROCEDURE — 25010000002 ONDANSETRON PER 1 MG: Performed by: INTERNAL MEDICINE

## 2024-12-09 PROCEDURE — 99233 SBSQ HOSP IP/OBS HIGH 50: CPT | Performed by: INTERNAL MEDICINE

## 2024-12-09 PROCEDURE — 82948 REAGENT STRIP/BLOOD GLUCOSE: CPT | Performed by: INTERNAL MEDICINE

## 2024-12-09 PROCEDURE — 94664 DEMO&/EVAL PT USE INHALER: CPT

## 2024-12-09 PROCEDURE — 25010000002 MORPHINE PER 10 MG: Performed by: INTERNAL MEDICINE

## 2024-12-09 PROCEDURE — 25010000002 LORAZEPAM PER 2 MG: Performed by: INTERNAL MEDICINE

## 2024-12-09 PROCEDURE — 74230 X-RAY XM SWLNG FUNCJ C+: CPT

## 2024-12-09 PROCEDURE — 82948 REAGENT STRIP/BLOOD GLUCOSE: CPT

## 2024-12-09 PROCEDURE — 63710000001 DEXAMETHASONE PER 0.25 MG: Performed by: INTERNAL MEDICINE

## 2024-12-09 PROCEDURE — 80053 COMPREHEN METABOLIC PANEL: CPT | Performed by: INTERNAL MEDICINE

## 2024-12-09 PROCEDURE — 25010000002 ENOXAPARIN PER 10 MG: Performed by: INTERNAL MEDICINE

## 2024-12-09 RX ORDER — SODIUM CHLORIDE, SODIUM LACTATE, POTASSIUM CHLORIDE, CALCIUM CHLORIDE 600; 310; 30; 20 MG/100ML; MG/100ML; MG/100ML; MG/100ML
75 INJECTION, SOLUTION INTRAVENOUS CONTINUOUS
Status: ACTIVE | OUTPATIENT
Start: 2024-12-09 | End: 2024-12-10

## 2024-12-09 RX ORDER — OXYCODONE HYDROCHLORIDE 5 MG/1
5 TABLET ORAL EVERY 6 HOURS PRN
Status: DISCONTINUED | OUTPATIENT
Start: 2024-12-09 | End: 2024-12-10

## 2024-12-09 RX ORDER — LORAZEPAM 2 MG/ML
0.25 INJECTION INTRAMUSCULAR EVERY 6 HOURS PRN
Status: DISCONTINUED | OUTPATIENT
Start: 2024-12-09 | End: 2024-12-11

## 2024-12-09 RX ORDER — OXYCODONE HYDROCHLORIDE 5 MG/1
10 TABLET ORAL EVERY 6 HOURS PRN
Status: DISCONTINUED | OUTPATIENT
Start: 2024-12-09 | End: 2024-12-10

## 2024-12-09 RX ORDER — MORPHINE SULFATE 2 MG/ML
2 INJECTION, SOLUTION INTRAMUSCULAR; INTRAVENOUS EVERY 4 HOURS PRN
Status: DISCONTINUED | OUTPATIENT
Start: 2024-12-09 | End: 2024-12-10

## 2024-12-09 RX ORDER — NICOTINE POLACRILEX 4 MG
15 LOZENGE BUCCAL
Status: DISCONTINUED | OUTPATIENT
Start: 2024-12-09 | End: 2024-12-12 | Stop reason: HOSPADM

## 2024-12-09 RX ORDER — IBUPROFEN 600 MG/1
1 TABLET ORAL
Status: DISCONTINUED | OUTPATIENT
Start: 2024-12-09 | End: 2024-12-12 | Stop reason: HOSPADM

## 2024-12-09 RX ORDER — FAMOTIDINE 10 MG/ML
20 INJECTION, SOLUTION INTRAVENOUS DAILY
Status: DISCONTINUED | OUTPATIENT
Start: 2024-12-09 | End: 2024-12-12 | Stop reason: HOSPADM

## 2024-12-09 RX ORDER — DEXTROSE MONOHYDRATE 25 G/50ML
25 INJECTION, SOLUTION INTRAVENOUS
Status: DISCONTINUED | OUTPATIENT
Start: 2024-12-09 | End: 2024-12-12 | Stop reason: HOSPADM

## 2024-12-09 RX ADMIN — OXYCODONE 10 MG: 5 TABLET ORAL at 14:30

## 2024-12-09 RX ADMIN — ACETAMINOPHEN 325 MG: 325 TABLET ORAL at 02:50

## 2024-12-09 RX ADMIN — OXYCODONE 10 MG: 5 TABLET ORAL at 20:39

## 2024-12-09 RX ADMIN — BARIUM SULFATE 50 ML: 400 SUSPENSION ORAL at 15:34

## 2024-12-09 RX ADMIN — ALPRAZOLAM 0.5 MG: 0.25 TABLET ORAL at 16:59

## 2024-12-09 RX ADMIN — MIDODRINE HYDROCHLORIDE 10 MG: 10 TABLET ORAL at 06:15

## 2024-12-09 RX ADMIN — ALPRAZOLAM 0.5 MG: 0.25 TABLET ORAL at 02:49

## 2024-12-09 RX ADMIN — Medication 10 ML: at 20:39

## 2024-12-09 RX ADMIN — SODIUM CHLORIDE, SODIUM LACTATE, POTASSIUM CHLORIDE, CALCIUM CHLORIDE 75 ML/HR: 20; 30; 600; 310 INJECTION, SOLUTION INTRAVENOUS at 18:10

## 2024-12-09 RX ADMIN — CETIRIZINE HYDROCHLORIDE 10 MG: 10 TABLET, FILM COATED ORAL at 09:10

## 2024-12-09 RX ADMIN — KETOROLAC TROMETHAMINE 30 MG: 30 INJECTION, SOLUTION INTRAMUSCULAR; INTRAVENOUS at 00:07

## 2024-12-09 RX ADMIN — MORPHINE SULFATE 2 MG: 2 INJECTION, SOLUTION INTRAMUSCULAR; INTRAVENOUS at 18:11

## 2024-12-09 RX ADMIN — BARIUM SULFATE 15 ML: 400 PASTE ORAL at 15:34

## 2024-12-09 RX ADMIN — ONDANSETRON 4 MG: 2 INJECTION INTRAMUSCULAR; INTRAVENOUS at 02:50

## 2024-12-09 RX ADMIN — ARFORMOTEROL TARTRATE 15 MCG: 15 SOLUTION RESPIRATORY (INHALATION) at 10:03

## 2024-12-09 RX ADMIN — ENOXAPARIN SODIUM 40 MG: 100 INJECTION SUBCUTANEOUS at 09:10

## 2024-12-09 RX ADMIN — KETOROLAC TROMETHAMINE 30 MG: 30 INJECTION, SOLUTION INTRAMUSCULAR; INTRAVENOUS at 06:09

## 2024-12-09 RX ADMIN — DEXAMETHASONE 6 MG: 4 TABLET ORAL at 09:10

## 2024-12-09 RX ADMIN — LORAZEPAM 0.25 MG: 2 INJECTION INTRAMUSCULAR; INTRAVENOUS at 23:45

## 2024-12-09 RX ADMIN — NICOTINE 1 PATCH: 21 PATCH, EXTENDED RELEASE TRANSDERMAL at 09:10

## 2024-12-09 RX ADMIN — SODIUM CHLORIDE 100 MG: 9 INJECTION, SOLUTION INTRAVENOUS at 12:51

## 2024-12-09 RX ADMIN — FAMOTIDINE 20 MG: 10 INJECTION INTRAVENOUS at 18:10

## 2024-12-09 RX ADMIN — BUDESONIDE 0.5 MG: 0.5 INHALANT RESPIRATORY (INHALATION) at 18:37

## 2024-12-09 RX ADMIN — ARFORMOTEROL TARTRATE 15 MCG: 15 SOLUTION RESPIRATORY (INHALATION) at 18:37

## 2024-12-09 RX ADMIN — BUDESONIDE 0.5 MG: 0.5 INHALANT RESPIRATORY (INHALATION) at 10:03

## 2024-12-09 RX ADMIN — Medication 10 ML: at 09:10

## 2024-12-09 RX ADMIN — MIDODRINE HYDROCHLORIDE 10 MG: 10 TABLET ORAL at 12:51

## 2024-12-09 RX ADMIN — FAMOTIDINE 20 MG: 20 TABLET ORAL at 09:10

## 2024-12-09 RX ADMIN — AZITHROMYCIN DIHYDRATE 250 MG: 250 TABLET ORAL at 09:09

## 2024-12-09 NOTE — PLAN OF CARE
Goal Outcome Evaluation:  Plan of Care Reviewed With: patient        Progress: no change  Outcome Evaluation: Patient refused the BiPAP even with several times asking. She has been on 3L NC all night.

## 2024-12-09 NOTE — SIGNIFICANT NOTE
12/09/24 0936   OTHER   Discipline speech language pathologist   Rehab Time/Intention   Session Not Performed patient/family declined, not feeling well     Patient reported nausea and pain this morning and did not wish to eat breakfast yet. Patient reported that she would like thin liquids and was agreeable to participating in an MBSS to rule out silent aspiration as her prior MBSS showed aspiration of thin liquids. Order for new MBSS has been placed.

## 2024-12-09 NOTE — PROGRESS NOTES
[facility]   Hospitalist Progress Note  Date: 2024  Patient Name: Isha Llanes  : 1965  MRN: [id number]  Date of admission: 2024      Subjective   Subjective     Chief Complaint: Shortness of air and increased lethargy started a day before admission.    Summary:   Isha Llanes is a 59 y.o. 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 Mancuso and Dr. Zelaya, Hodgkin lymphoma, Dilaudid pain pump for cancer, lupus on chronic prednisone, anxiety disorder, CAD, neuropathy, diabetes mellitus, hypertension, aortic stenosis s/p TAVR, aspiration, chronic hypoxemia as needed home oxygen use, chronic hoarseness and bedridden with contractures of lower extremities  Patient presented with increasing shortness of air and lethargy to [facility] ED.  History is obtained by talking to ED physician to be in the record and daughter at bedside as patient is lethargic.  Patient does wake up to vocal response but goes back to sleep her voice is very hoarse and difficult to comprehend, this is chronic per daughter.  Patient has been having fever along with cough.  Also has mild diarrhea.  Patient has not been vaccinated for COVID.  Workup in the ED showed temperature of 103 heart rate of 132 with soft blood pressure.  91% saturation on 4 L.  ABG showed pH of 7.36, pCO2 of 61, pO2 of 54, O2 sat 86% on 4 L.  CMP is negative.  Lactate is negative.  CBC is negative.  UA is negative.  EKG shows sinus tachycardia 122.  CTA of the chest does not show any PE does have right hilar lymphadenopathy reactive versus neoplastic, narrowing of larynx with edema and thickening similar to previous PET CT from .  Also has new 7 mm right lower lobe pulm nodule.  COVID PCR is positive.  Hospitalist has been called to admit her for further management.  Patient mental condition improved but she failed swallow study with high risk for  aspiration and made n.p.o.  Patient agreeable for PEG tube.    Interval Followup:   Blood pressure soft.  Patient denies any dizziness.  Per patient she runs low blood pressure  No more fever  Patient on 3 L now with 96% saturation.  Has been refusing NIPPV.  Patient is awake alert oriented x 3.  Patient much more comfortable and not using accessory muscles anymore , continues to have shortness of air  Continues to have cough not able to bring up mucus.  Denies any chest pain.  Complaining of back pain.  Her pain pump is not working as it has not been charged.  Patient failed swallow study and made n.p.o. as per speech commendation.  Patient very hesitant eventually agreed to have peg tube and stay n.p.o.    Review of Systems   All systems were reviewed and negative except for: Summary and interval follow-up    Objective   Objective     Vitals:   Temp:  [97.2 °F (36.2 °C)-98.4 °F (36.9 °C)] 98.4 °F (36.9 °C)  Heart Rate:  [58-73] 61  Resp:  [16-18] 18  BP: ()/(56-76) 134/72  Flow (L/min) (Oxygen Therapy):  [3] 3  Physical Exam    Constitutional: Awake, alert, no acute distress   Eyes: Pupils equal, sclerae anicteric, no conjunctival injection   HENT: NCAT, mucous membranes moist   Neck: Supple, no thyromegaly, no lymphadenopathy, trachea midline   Respiratory: Decreased to auscultation bilaterally, nonlabored respirations    Cardiovascular: Left upper port chest with a healed scar, RRR, 2/6 murmurs, rubs, or gallops, palpable pedal pulses bilaterally   Gastrointestinal: Positive bowel sounds, soft, nontender, nondistended   Musculoskeletal: Chronic +1 bilateral ankle edema, no clubbing or cyanosis to extremities   Psychiatric: Appropriate affect, cooperative   Neurologic: Oriented x 3, contracture of lower extremities, Cranial Nerves grossly intact to confrontation, speech clear although chronically hoarse   Skin: Chronic stasis changes bilateral lower extremities    Result Review    Result Review:  I have  personally reviewed the results for the past 24 hours and agree with these findings:  [x]  Laboratory  [x]  Microbiology  [x]  Radiology  [x]  EKG/Telemetry EKG reviewed ST segment changes same as previous EKGs.  []  Cardiology/Vascular   []  Pathology  [x]  Old records  [x]  Other: Medications    Assessment & Plan   Assessment / Plan     Assessment:  Acute metabolic encephalopathy due to COVID-pneumonia.  Resolved  Acute on chronic hypoxemic respiratory failure patient needing NIPPV due to use of accessory and abdominal muscles.  Improving  COVID-19 pneumonia.  COPD exacerbation.  Chronic hoarseness, dysphagia and aspiration.  History of supraglottic squamous cell cancer of larynx status post XRT.  Chronic narrowing of larynx with edema and thickening  Bedridden and chronic contractures of lower extremities.  Sepsis as patient has fever, tachycardia and hypoxemia.  Right hilar lymphadenopathy reactive versus neoplastic.  New 7 mm right lower lobe pulmonary nodule.  Need repeat CT chest in 3 months.  Aortic stenosis status post TAVR.  Diabetes mellitus.  Neuropathy.  Lupus on chronic prednisone.  Chronic pain from cancer on Dilaudid pump.  History of Hodgkin's lymphoma.  Hypertension.  Anxiety disorder.     Plan:   Continue supplemental oxygen keep sats more than 90%.  NIPPV as needed.  Patient is n.p.o. as per speech therapy recommendation due to aspiration seen on barium swallow study.  Patient agreeable for PEG.  P.o. medications DC'd  DC midodrine  Decadron day 3.  Remdesivir.  DC Zithromax.  Respiratory culture.  Legionella and strep pneumonia antigen.  Negative MRSA PCR  DuoNeb, Pulmicort brovana neb.  Bronchodilator and bronchopulmonary hygiene protocol.  Incentive spirometry and flutter valve.  Speech therapy evaluation noted.  Appreciate and discussed input.  Complete n.p.o.  Discussed with GI to evaluate patient for PEG.  Patient had PEG in the past.  Discussed with heme-onc as patient wants to know her  cancer status.  Continue sliding-scale insulin.  Lactic acid, procalcitonin and BNP negative  PT OT.  Resumed appropriate home medications.  DC Home pain pump, continue as needed Xanax.  Since patient n.p.o. will use as needed IV.  Morphine  Nicotine patch  Continue telemetry      Discussed plan with RN.  Discharge home when stable after PEG placement    VTE Prophylaxis:  Pharmacologic & mechanical VTE prophylaxis orders are present.        CODE STATUS:   Code Status (Patient has no pulse and is not breathing): CPR (Attempt to Resuscitate)  Medical Interventions (Patient has pulse or is breathing): Full Support      Part of this note may be an electronic transcription/translation of spoken language to printed text using the Dragon Dictation System.       Electronically signed by Isaias Kay MD, 12/09/24, 5:30 PM EST.

## 2024-12-09 NOTE — PLAN OF CARE
Goal Outcome Evaluation:  Plan of Care Reviewed With: patient        Progress: no change  Outcome Evaluation: Patient a&ox4. Frequently c/o severe back pain; medicated prn per MAR, otd morphine ordered and admin per MAR. Nauseous x1, pt frustrated with thickened diet. Prn xanax x1. Discussed palliative care options with patient, consult placed per patient's request.

## 2024-12-09 NOTE — CONSULTS
Purpose of the visit was to evaluate for: goals of care/advanced care planning and support for patient/family. Spoke with RN and patient and discussed goals of care, resuscitation status, and clarify code status.      Assessment:  RN, CCM, Palliative Care met with patient to introduce self and assess palliative needs.  History of supraglottic squamous cell laryngeal cancer, Hodgkin lymphoma, Dilaudid pain pump for cancer, lupus, anxiety disorder, CAD, neuropathy, diabetes mellitus, hypertension, aortic stenosis s/p TAVR, chronic hypoxemia as needed home oxygen use, chronic hoarseness and bedridden with contractures of lower extremities  Patient reports lives with daughter that assist with care.  Reports no POA or Living Will and is not interested at this time.  Educated on chest compressions and intubation.  Patient would like to remain a full code.  Will continue to monitor.      Recommendations/Plan: Home self-care.    Tasks Completed: Code Status clarification and Emotional Support.    Magdalene ANGULO RN, CCM  Palliative Care

## 2024-12-09 NOTE — PLAN OF CARE
"Goal Outcome Evaluation:  Plan of Care Reviewed With: patient        Progress: improving  Outcome Evaluation: Patient alert and oriented throughout shift. VSS. Pain treated per MAR. Patient very irritable and disgruntled with medication regimen and pureed/nectar thick diet, RN educated patient on windows for pain and anxiety medication as well as need for aspiration precautions. Patient stated, \"I'm not aspirating on anything.\" Patient up with x1 assist to bedside commode, significant weakness and gait deficit noted. Swallow study completed this afternoon, strict NPO and feeding tube placement recommended. Plan of care discussed with patient and Dr. Kay at bedside. Patient agreeable to feeding tube placement. Patient verbalized explicit understanding of NPO status effective immediately, verbalized understanding with this RN and Dr. Kay. Continue with plan of care.                             "

## 2024-12-09 NOTE — PROGRESS NOTES
RT EQUIPMENT DEVICE RELATED - SKIN ASSESSMENT    RT Medical Equipment/Device:     NIV Mask:  Full-face    size: S    Skin Assessment:      MOUTH, NOSE, CHEEKS:  Intact    Device Skin Pressure Protection:  Positioning supports utilized    Nurse Notification:  Zayda Ortiz, RRT

## 2024-12-09 NOTE — PROGRESS NOTES
RT EQUIPMENT DEVICE RELATED - SKIN ASSESSMENT    RT Medical Equipment/Device:     Nasal Cannula    Skin Assessment:      Cheek:  Intact  Ears:  Intact  Nares:  Intact    Device Skin Pressure Protection:   N/A    Nurse Notification:  Zayda Garibay, CRT

## 2024-12-09 NOTE — PLAN OF CARE
Goal Outcome Evaluation:      Patient refused the Bipap last night. Currently on 3 l/m nasal cannula and tolerating well.

## 2024-12-09 NOTE — MBS/VFSS/FEES
Acute Care - Speech Language Pathology   Modified Barium Swallow Study  JEMMA Go     Patient Name: Isha Llanes  : 1965  MRN: [id number]  Today's Date: 12/10/2024               Admit Date: 2024    Visit Dx:     ICD-10-CM ICD-9-CM   1. Shortness of breath  R06.02 786.05   2. COVID-19  U07.1 079.89   3. Oropharyngeal dysphagia  R13.12 787.22     Patient Active Problem List   Diagnosis    Septic shock    Acute MI    Cancer associated pain    Presence of intrathecal pump    Chronic low back pain with bilateral sciatica    Sacroiliac inflammation    Chronic pain syndrome    Pelvic pain    Aortic stenosis, severe    Dyspnea on effort    Other pulmonary embolism without acute cor pulmonale    Nonrheumatic aortic valve stenosis    Hip pain    Anxiety disorder    Allergic rhinitis due to allergen    Arthritis    Asthma    Kidney disease    CHF (congestive heart failure)    Chronic obstructive pulmonary disease    Diabetes mellitus, type 2    Depression    GERD (gastroesophageal reflux disease)    S/P TAVR (transcatheter aortic valve replacement)    Limited mobility    Venous hypertension of both lower extremities    Nicotine dependence    Mitral valve prolapse    Lupus (systemic lupus erythematosus)    Long term current use of anticoagulant therapy    History of Hodgkin's lymphoma    Hepatic steatosis    Heart murmur    CAD (coronary artery disease)    Non-healing wound of lower extremity, subsequent encounter    MSSA (methicillin susceptible Staphylococcus aureus) infection    Sepsis    Change in bowel habits    Nausea    Venous stasis ulcer of right calf    Venous stasis ulcer of left calf    Noncompliance    Peripheral vascular disease    Obesity with body mass index 30 or greater    Neurologic disorder    Neck pain    Limb pain    Hiatal hernia    Bladder disorder    Frailty    Cellulitis of right foot    Bowel disease    Atrophy of skin    Breast lump    Encounter for care related to vascular access  port    Primary osteoarthritis of right hip    Tobacco abuse    Laryngeal cancer    Throat pain    Voice hoarseness    Hodgkin lymphoma    Malignant neoplasm of supraglottis    Dysphagia    Nausea vomiting and diarrhea    Weight loss, abnormal    Epigastric pain    Nausea and vomiting    History of laryngeal cancer    Anorexia    AMS (altered mental status)    Acute-on-chronic respiratory failure    UTI (urinary tract infection)    Anterior neck pain    COVID-19 virus infection    Hypoxemia     Past Medical History:   Diagnosis Date    Anesthesia     REPORTS HAS GOT REAL EMOTIONAL AND HAS BECOME ANGRY BEFORE    Anxiety     Aortic stenosis, severe     HAS BIO VALVE REPLACED    Arthritis     Asthma     Back pain     Blood clotting disorder     Cancer     Cancer associated pain     HODGKINS LYMPHOMA    CHF (congestive heart failure)     Chronic low back pain with sciatica     Chronic pain disorder     COPD (chronic obstructive pulmonary disease)     Coronary artery disease     Diabetes mellitus     TYPE 2    Diabetes mellitus     Dyspnea on effort     GERD (gastroesophageal reflux disease)     H/O lumpectomy     H/O: hysterectomy     Hiatal hernia     History of degenerative disc disease     History of kidney stones     History of pulmonary embolus (PE)     History of transfusion     AS A CHILD NO TRANSFUSION REACTION    History of transfusion     Hodgkin's lymphoma     5/19/23  5 YEARS SINCE LAST CHEMO TX    Hypertension     Immobility     Liver disease     DENIES ANY CURRENT ISSUES    Lupus     Mitral valve prolapse     Nausea vomiting and diarrhea 3/7/2024    Panic disorder     Pelvic pain     Peripheral neuropathy     Personal history of DVT (deep vein thrombosis)     Presence of intrathecal pump     PTSD (post-traumatic stress disorder)     Sacroiliac joint disease     SI (sacroiliac) joint inflammation     Sleep apnea     Venous insufficiency      Past Surgical History:   Procedure Laterality Date    ABDOMINAL  SURGERY      BACK SURGERY      SPINAL FUSION     BACK SURGERY      BLADDER REPAIR      CARDIAC CATHETERIZATION N/A 03/06/2019    Procedure: Valvuloplasty;  Surgeon: Wayne Johnson MD;  Location: [facility] CATH INVASIVE LOCATION;  Service: Cardiology    CARDIAC CATHETERIZATION      CARDIAC CATHETERIZATION Left 5/31/2023    Procedure: Cardiac Catheterization/Vascular Study;  Surgeon: Poncho Reid MD;  Location: [facility] CATH INVASIVE LOCATION;  Service: Cardiovascular;  Laterality: Left;    CARDIAC SURGERY      CARDIAC VALVE REPLACEMENT  2021    CHOLECYSTECTOMY      COLONOSCOPY N/A 12/29/2021    Procedure: COLONOSCOPY;  Surgeon: Karine Orlando MD;  Location: [facility] ENDOSCOPY;  Service: Gastroenterology;  Laterality: N/A;  POOR PREP    COLONOSCOPY N/A 04/13/2022    Procedure: COLONOSCOPY WITH POLYPECTOMY;  Surgeon: Karine Orlando MD;  Location: [facility] ENDOSCOPY;  Service: Gastroenterology;  Laterality: N/A;  COLON POLYP, HEMORRHOIDS, POOR PREP    COLONOSCOPY      DIRECT LARYNGOSCOPY, ESOPHAGOSCOPY, BRONCHOSCOPY N/A 5/22/2023    Procedure: DIRECT LARYNGOSCOPY WITH BIOPSIES , ESOPHAGOSCOPY, BRONCHOSCOPY;  Surgeon: Ronnie Mcgarry MD;  Location: [facility] OR [facility];  Service: ENT;  Laterality: N/A;    ENDOSCOPY N/A 12/29/2021    Procedure: ESOPHAGOGASTRODUODENOSCOPY;  Surgeon: Karine Orlando MD;  Location: [facility] ENDOSCOPY;  Service: Gastroenterology;  Laterality: N/A;  ESOPHAGITIS, GASTRITIS, HIATAL HERNIA    ENDOSCOPY      ENDOSCOPY N/A 4/16/2024    Procedure: ESOPHAGOGASTRODUODENOSCOPY WITH BIOPSIES;  Surgeon: Karine Orlando MD;  Location: [facility] ENDOSCOPY;  Service: Gastroenterology;  Laterality: N/A;  ESOPHAGITIS, HIATAL HERNIA    HYSTERECTOMY      LYMPHADENECTOMY      PAIN PUMP INSERTION/REVISION N/A 10/18/2019    Procedure: PAIN PUMP INSERTION [facility] 10[facility]18-19 [facility];  Surgeon: Horace Rios MD;  Location: [facility];  Service: Pain Management    PAIN PUMP  INSERTION/REVISION N/A 11/23/2020    Procedure: pain pump removal;  Surgeon: Horace Rios MD;  Location:  [facility];  Service: Pain Management;  Laterality: N/A;    PEG TUBE INSERTION N/A 7/26/2023    Procedure: PERCUTANEOUS ENDOSCOPIC GASTROSTOMY TUBE INSERTION;  Surgeon: Kody Mercer MD;  Location:  [facility];  Service: General;  Laterality: N/A;  SUCCESSFUL PEG TUBE PLACE PLACEMENT    PORTACATH PLACEMENT      IN LEFT CHEST REPORTS THAT IT IS NOT FUNCTIONING    SKIN BIOPSY      TONSILLECTOMY      TUBAL ABDOMINAL LIGATION      TUMOR REMOVAL         SLP Recommendation and Plan      MODIFIED BARIUM SWALLOW STUDY: SPEECH PATHOLOGY REPORT        DATE OF SERVICE:  12/9/2024    PERTINENT INFORMATION:  Isha is a 59 year old female with a 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     Isha was referred for an MBSS by Dr. Isaias Kay to rule out aspiration as well as to determine appropriate treatment plan for this patient.      PROCEDURE:    Isha was alert and cooperative.  The patient was viewed in the lateral position.  The following Ba consistencies were administered:  level 2 mildly thick/nectar-thick liquids, level 3 moderately thick/honey-thick liquids, and level 4 pureed solids.       RESULTS:    Patient was positioned upright in chair. Patient was given trials of level 2 mildly thick/nectar-thick liquids, level 3 moderately thick/honey-thick liquids, and level 4 pureed solids.     Level 2 Mildly thick/Nectar-thick barium: Patient was given nectar-thick barium by cup. Tongue pumping noted. Premature spillage to the level of the pyriform sinuses. Mild residue in the oral  cavity. race residue in the vallecula, posterior pharyngeal wall, and pyriform sinuses. Silent aspiration noted.     Level 3 Moderately thick/Honey-thick barium: Patient was given honey-thick barium by cup. Tongue pumping noted. Premature spillage to the vallecula. Mild residue in the oral cavity. Trace residue in the vallecula, posterior pharyngeal wall, and pyriform sinuses. Deep laryngeal penetration. Trace aspiration noted.     Level 4 Puree solids (Applesauce): Patient was given trials of puree solids mixed with barium. Tongue pumping noted. Premature spillage to the vallecula. Mild residue in the oral cavity. race residue in the vallecula, posterior pharyngeal wall, and pyriform sinuses. Silent aspiration noted.     Additional trials not completed due to safety concerns.    IMPRESSIONS:    Isha demonstrated oropharyngeal dysphagia characterized by silent aspiration of nectar thick liquids and pureed solids, trace aspiration of honey thick liquids, premature spillage, and tongue pumping.    FUNCTIONAL DEFICIT: Patient scored level 1 of 7 on Functional Communication Measures for swallowing indicating a 100% limitation in function for current status, goal status, and discharge status.      RECOMMENDATIONS:   1.  SLP recommends the patient be NPO due to aspiration of all consistencies. Physician may wish to consider if the patient is appropriate for core track placement or comfort measures with pleasure feedings.   2.  Patient may benefit from follow up speech services to work on swallowing exercises; however, when providing education of results of swallow study patient indicated dislike for follow up therapy at this time.       Patient/responsible party agrees with the plan of care and has been informed of all alternatives, risks and benefits.    Thank you for this referral.                                                                                    EDUCATION  The patient has been educated in the  following areas:   Dysphagia (Swallowing Impairment).                Time Calculation:    Time Calculation- SLP       Row Name 12/10/24 0722             Time Calculation- SLP    SLP Start Time 1430  -AW      SLP Stop Time 1600  -AW      SLP Time Calculation (min) 90 min  -AW      SLP Received On 12/09/24  -AW         Untimed Charges    33939-CF Motion Fluoro Eval Swallow Minutes 90  -AW         Total Minutes    Untimed Charges Total Minutes 90  -AW       Total Minutes 90  -AW                User Key  (r) = Recorded By, (t) = Taken By, (c) = Cosigned By      Initials Name Provider Type    Anaid Morgan SLP Speech and Language Pathologist                    Therapy Charges for Today       Code Description Service Date Service Provider Modifiers Qty    [id number] HC ST MOTION FLUORO EVAL SWALLOW 6 12/10/2024 Anaid Costa SLP GN 1                 SAM Esteves  12/10/2024

## 2024-12-09 NOTE — CONSULTS
[facility]   Consult Note    Patient Name: Isha Llanes  : 1965  MRN: [id number]  Primary Care Physician:  Virgil Salas MD  Date of admission: 2024    Subjective   Subjective     Reason for Consult: History of Hodgkin's lymphoma and head and neck cancer    HPI: Patient known to our practice for a long time with history of rheumatoid arthritis as well as Hodgkin's lymphoma in remission she also was diagnosed to have laryngeal cancer has received radiation therapy and some complications at this time she has been admitted with COVID-pneumonia as well as aspiration I have been asked for a follow-up patient has not been very compliant and has seldom been keeping her appointments she recently has already been evaluated by cardiology previously was complaining about syncopal episode and there was some concerned about syncope but it looks like that she has seen the cardiologist and extensive workup has been done this time I agree with the plan of treatment to put PEG tube for feeding because she definitely aspirate    Isha Llanes is a 59 y.o. female with Hodgkin's disease being admitted with aspiration pneumonia and COVID    Review of Systems   All systems were reviewed and negative except for: Has been reviewed    Personal History     Past Medical History:   Diagnosis Date    Anesthesia     REPORTS HAS GOT REAL EMOTIONAL AND HAS BECOME ANGRY BEFORE    Anxiety     Aortic stenosis, severe     HAS BIO VALVE REPLACED    Arthritis     Asthma     Back pain     Blood clotting disorder     Cancer     Cancer associated pain     HODGKINS LYMPHOMA    CHF (congestive heart failure)     Chronic low back pain with sciatica     Chronic pain disorder     COPD (chronic obstructive pulmonary disease)     Coronary artery disease     Diabetes mellitus     TYPE 2    Diabetes mellitus     Dyspnea on effort     GERD (gastroesophageal reflux disease)     H/O lumpectomy     H/O: hysterectomy     Hiatal  hernia     History of degenerative disc disease     History of kidney stones     History of pulmonary embolus (PE)     History of transfusion     AS A CHILD NO TRANSFUSION REACTION    History of transfusion     Hodgkin's lymphoma     5/19/23  5 YEARS SINCE LAST CHEMO TX    Hypertension     Immobility     Liver disease     DENIES ANY CURRENT ISSUES    Lupus     Mitral valve prolapse     Nausea vomiting and diarrhea 3/7/2024    Panic disorder     Pelvic pain     Peripheral neuropathy     Personal history of DVT (deep vein thrombosis)     Presence of intrathecal pump     PTSD (post-traumatic stress disorder)     Sacroiliac joint disease     SI (sacroiliac) joint inflammation     Sleep apnea     Venous insufficiency        Past Surgical History:   Procedure Laterality Date    ABDOMINAL SURGERY      BACK SURGERY      SPINAL FUSION     BACK SURGERY      BLADDER REPAIR      CARDIAC CATHETERIZATION N/A 03/06/2019    Procedure: Valvuloplasty;  Surgeon: Wayne Johnson MD;  Location: [facility] CATH INVASIVE LOCATION;  Service: Cardiology    CARDIAC CATHETERIZATION      CARDIAC CATHETERIZATION Left 5/31/2023    Procedure: Cardiac Catheterization/Vascular Study;  Surgeon: Poncho Reid MD;  Location: [facility] CATH INVASIVE LOCATION;  Service: Cardiovascular;  Laterality: Left;    CARDIAC SURGERY      CARDIAC VALVE REPLACEMENT  2021    CHOLECYSTECTOMY      COLONOSCOPY N/A 12/29/2021    Procedure: COLONOSCOPY;  Surgeon: Karine Orlando MD;  Location: [facility] ENDOSCOPY;  Service: Gastroenterology;  Laterality: N/A;  POOR PREP    COLONOSCOPY N/A 04/13/2022    Procedure: COLONOSCOPY WITH POLYPECTOMY;  Surgeon: Karine Orlando MD;  Location: [facility] ENDOSCOPY;  Service: Gastroenterology;  Laterality: N/A;  COLON POLYP, HEMORRHOIDS, POOR PREP    COLONOSCOPY      DIRECT LARYNGOSCOPY, ESOPHAGOSCOPY, BRONCHOSCOPY N/A 5/22/2023    Procedure: DIRECT LARYNGOSCOPY WITH BIOPSIES , ESOPHAGOSCOPY, BRONCHOSCOPY;   Surgeon: Ronnie Mcgarry MD;  Location: [facility] OR [facility];  Service: ENT;  Laterality: N/A;    ENDOSCOPY N/A 12/29/2021    Procedure: ESOPHAGOGASTRODUODENOSCOPY;  Surgeon: Karine Orlando MD;  Location: [facility] ENDOSCOPY;  Service: Gastroenterology;  Laterality: N/A;  ESOPHAGITIS, GASTRITIS, HIATAL HERNIA    ENDOSCOPY      ENDOSCOPY N/A 4/16/2024    Procedure: ESOPHAGOGASTRODUODENOSCOPY WITH BIOPSIES;  Surgeon: Karine Orlando MD;  Location: [facility] ENDOSCOPY;  Service: Gastroenterology;  Laterality: N/A;  ESOPHAGITIS, HIATAL HERNIA    HYSTERECTOMY      LYMPHADENECTOMY      PAIN PUMP INSERTION/REVISION N/A 10/18/2019    Procedure: PAIN PUMP INSERTION [facility] 10-18-19 [place];  Surgeon: Horace Rios MD;  Location: [facility];  Service: Pain Management    PAIN PUMP INSERTION/REVISION N/A 11/23/2020    Procedure: pain pump removal;  Surgeon: Horace Rios MD;  Location: [facility];  Service: Pain Management;  Laterality: N/A;    PEG TUBE INSERTION N/A 7/26/2023    Procedure: PERCUTANEOUS ENDOSCOPIC GASTROSTOMY TUBE INSERTION;  Surgeon: Kody Mercer MD;  Location: [facility] ENDOSCOPY;  Service: General;  Laterality: N/A;  SUCCESSFUL PEG TUBE PLACE PLACEMENT    PORTACATH PLACEMENT      IN LEFT CHEST REPORTS THAT IT IS NOT FUNCTIONING    SKIN BIOPSY      TONSILLECTOMY      TUBAL ABDOMINAL LIGATION      TUMOR REMOVAL         Family History: family history includes ADD / ADHD in her brother, granddaughter, grandson, and nephew; Anxiety disorder in her mother; Bipolar disorder in her sister; Depression in her sister; Heart failure in her mother; Pancreatic cancer in her paternal grandmother and sister; Prostate cancer in her father; Thyroid cancer in her maternal grandmother. Otherwise pertinent FHx was reviewed and not pertinent to current issue.    Social History:  reports that she has been smoking cigarettes. She started smoking about 45 years ago. She has a 68.9 pack-year smoking  history. She has been exposed to tobacco smoke. She has never used smokeless tobacco. She reports that she does not drink alcohol and does not use drugs.    Home Medications:  ALPRAZolam, acetaminophen, albuterol sulfate HFA, cetirizine, clopidogrel, mupirocin, pain, and predniSONE      Allergies:  Allergies   Allergen Reactions    Amoxicillin Shortness Of Breath     Has tolerated Cefazolin, Ceftriaxone, and Cefepime -[facility], [facility]    Ceclor [Cefaclor] Shortness Of Breath     Has tolerated Cefazolin, Ceftriaxone, and Cefepime -[facility], [facility]      Penicillins Shortness Of Breath     Has tolerated Cefazolin, Ceftriaxone, and [facility]fepime -[facility]    Sulfa Antibiotics Rash    Valium [Diazepam] Mental Status Change     DEPRESSED    Ambien [Zolpidem] Mental Status Change    Aspirin GI Intolerance    Ativan [Lorazepam] Mental Status Change    Benadryl [Diphenhydramine] Anxiety     AND MAKES HER HYPER    Biaxin [Clarithromycin] Unknown - Low Severity    Cephalexin Unknown - Low Severity    Clindamycin Unknown - Low Severity    Compazine [Prochlorperazine Edisylate] Rash    Contrast Dye (Echo Or Unknown Ct/Mr) Other (See Comments)     Caused pain in her arm    Doxycycline Rash    Nsaids GI Intolerance    Phenergan [Promethazine Hcl] GI Intolerance    Promethazine GI Intolerance    Vancomycin Itching       Objective   Objective     Vitals:   Temp:  [97.2 °F (36.2 °C)-98.4 °F (36.9 °C)] 98.4 °F (36.9 °C)  Heart Rate:  [58-73] 61  Resp:  [16-18] 18  BP: ()/(56-76) 134/72  Flow (L/min) (Oxygen Therapy):  [3] 3    Physical Exam    Constitutional: Alert and oriented not in acute distress   Eyes: Not examined patient has COVID   HENT: No deformities   Neck: No deformities   Respiratory: No rales or rhonchi   Cardiovascular: S1-S2 normal no S3 or S4   Gastrointestinal: No palpable organomegaly   Musculoskeletal: No deformities   Neurologic: No localizing signs  Skin: No rash      Result Review     Result Review:  I have personally reviewed the results from the time of this admission to 12/9/2024 17:30 EST and agree with these findings:  [x]  Laboratory  [x]  Microbiology  [x]  Radiology  []  EKG/Telemetry   []  Cardiology/Vascular   []  Pathology  []  Old records  []  Other:  Most notable findings include: Have been reviewed and discussed    Assessment & Plan   Assessment / Plan     Brief Patient Summary:  Isha Llanes is a 59 y.o. female who patient with COVID and aspiration pneumonia    Active Hospital Problems:  Active Hospital Problems    Diagnosis     **COVID-19 virus infection     Hypoxemia        Plan:   Continue the management as per primary care physician        Electronically signed by Teodoro Mancuso MD, 12/09/24, 5:30 PM EST.    Part of this note may be an electronic transcription/translation of spoken language to printed text using the Dragon Dictation System.

## 2024-12-10 ENCOUNTER — ANESTHESIA (OUTPATIENT)
Dept: GASTROENTEROLOGY | Facility: HOSPITAL | Age: 59
End: 2024-12-10
Payer: COMMERCIAL

## 2024-12-10 ENCOUNTER — APPOINTMENT (OUTPATIENT)
Dept: CT IMAGING | Facility: HOSPITAL | Age: 59
End: 2024-12-10
Payer: COMMERCIAL

## 2024-12-10 ENCOUNTER — PREP FOR SURGERY (OUTPATIENT)
Dept: OTHER | Facility: HOSPITAL | Age: 59
End: 2024-12-10
Payer: COMMERCIAL

## 2024-12-10 ENCOUNTER — APPOINTMENT (OUTPATIENT)
Dept: GENERAL RADIOLOGY | Facility: HOSPITAL | Age: 59
End: 2024-12-10
Payer: COMMERCIAL

## 2024-12-10 ENCOUNTER — ANESTHESIA EVENT (OUTPATIENT)
Dept: GASTROENTEROLOGY | Facility: HOSPITAL | Age: 59
End: 2024-12-10
Payer: COMMERCIAL

## 2024-12-10 DIAGNOSIS — R41.82 AMS (ALTERED MENTAL STATUS): Primary | ICD-10-CM

## 2024-12-10 LAB
ALBUMIN SERPL-MCNC: 3.1 G/DL (ref 3.5–5.2)
ALBUMIN SERPL-MCNC: 3.2 G/DL (ref 3.5–5.2)
ALBUMIN/GLOB SERPL: 1.2 G/DL
ALBUMIN/GLOB SERPL: 1.2 G/DL
ALP SERPL-CCNC: 43 U/L (ref 39–117)
ALP SERPL-CCNC: 44 U/L (ref 39–117)
ALT SERPL W P-5'-P-CCNC: 10 U/L (ref 1–33)
ALT SERPL W P-5'-P-CCNC: 12 U/L (ref 1–33)
ANION GAP SERPL CALCULATED.3IONS-SCNC: 12.3 MMOL/L (ref 5–15)
ANION GAP SERPL CALCULATED.3IONS-SCNC: 7.5 MMOL/L (ref 5–15)
AST SERPL-CCNC: 18 U/L (ref 1–32)
AST SERPL-CCNC: 20 U/L (ref 1–32)
BASOPHILS # BLD AUTO: 0.01 10*3/MM3 (ref 0–0.2)
BASOPHILS NFR BLD AUTO: 0.2 % (ref 0–1.5)
BILIRUB SERPL-MCNC: 0.3 MG/DL (ref 0–1.2)
BILIRUB SERPL-MCNC: 0.3 MG/DL (ref 0–1.2)
BUN SERPL-MCNC: 12 MG/DL (ref 6–20)
BUN SERPL-MCNC: 14 MG/DL (ref 6–20)
BUN/CREAT SERPL: 20.3 (ref 7–25)
BUN/CREAT SERPL: 28 (ref 7–25)
CALCIUM SPEC-SCNC: 7.5 MG/DL (ref 8.6–10.5)
CALCIUM SPEC-SCNC: 8.4 MG/DL (ref 8.6–10.5)
CHLORIDE SERPL-SCNC: 102 MMOL/L (ref 98–107)
CHLORIDE SERPL-SCNC: 104 MMOL/L (ref 98–107)
CO2 SERPL-SCNC: 26.7 MMOL/L (ref 22–29)
CO2 SERPL-SCNC: 31.5 MMOL/L (ref 22–29)
CREAT SERPL-MCNC: 0.5 MG/DL (ref 0.57–1)
CREAT SERPL-MCNC: 0.59 MG/DL (ref 0.57–1)
D-LACTATE SERPL-SCNC: 0.8 MMOL/L (ref 0.5–2)
DEPRECATED RDW RBC AUTO: 44.2 FL (ref 37–54)
EGFRCR SERPLBLD CKD-EPI 2021: 104 ML/MIN/1.73
EGFRCR SERPLBLD CKD-EPI 2021: 108.2 ML/MIN/1.73
EOSINOPHIL # BLD AUTO: 0.01 10*3/MM3 (ref 0–0.4)
EOSINOPHIL NFR BLD AUTO: 0.2 % (ref 0.3–6.2)
ERYTHROCYTE [DISTWIDTH] IN BLOOD BY AUTOMATED COUNT: 13.9 % (ref 12.3–15.4)
GLOBULIN UR ELPH-MCNC: 2.6 GM/DL
GLOBULIN UR ELPH-MCNC: 2.7 GM/DL
GLUCOSE BLDC GLUCOMTR-MCNC: 121 MG/DL (ref 70–99)
GLUCOSE BLDC GLUCOMTR-MCNC: 90 MG/DL (ref 70–99)
GLUCOSE SERPL-MCNC: 89 MG/DL (ref 65–99)
GLUCOSE SERPL-MCNC: 90 MG/DL (ref 65–99)
HCT VFR BLD AUTO: 39 % (ref 34–46.6)
HGB BLD-MCNC: 11.9 G/DL (ref 12–15.9)
HOLD SPECIMEN: NORMAL
IMM GRANULOCYTES # BLD AUTO: 0.01 10*3/MM3 (ref 0–0.05)
IMM GRANULOCYTES NFR BLD AUTO: 0.2 % (ref 0–0.5)
LYMPHOCYTES # BLD AUTO: 0.57 10*3/MM3 (ref 0.7–3.1)
LYMPHOCYTES NFR BLD AUTO: 12.6 % (ref 19.6–45.3)
MCH RBC QN AUTO: 26.7 PG (ref 26.6–33)
MCHC RBC AUTO-ENTMCNC: 30.5 G/DL (ref 31.5–35.7)
MCV RBC AUTO: 87.6 FL (ref 79–97)
MONOCYTES # BLD AUTO: 0.21 10*3/MM3 (ref 0.1–0.9)
MONOCYTES NFR BLD AUTO: 4.6 % (ref 5–12)
NEUTROPHILS NFR BLD AUTO: 3.72 10*3/MM3 (ref 1.7–7)
NEUTROPHILS NFR BLD AUTO: 82.2 % (ref 42.7–76)
NRBC BLD AUTO-RTO: 0 /100 WBC (ref 0–0.2)
PLATELET # BLD AUTO: 115 10*3/MM3 (ref 140–450)
PMV BLD AUTO: 9.3 FL (ref 6–12)
POTASSIUM SERPL-SCNC: 3.5 MMOL/L (ref 3.5–5.2)
POTASSIUM SERPL-SCNC: 4.1 MMOL/L (ref 3.5–5.2)
PROT SERPL-MCNC: 5.7 G/DL (ref 6–8.5)
PROT SERPL-MCNC: 5.9 G/DL (ref 6–8.5)
QT INTERVAL: 408 MS
QTC INTERVAL: 502 MS
RBC # BLD AUTO: 4.45 10*6/MM3 (ref 3.77–5.28)
SODIUM SERPL-SCNC: 141 MMOL/L (ref 136–145)
SODIUM SERPL-SCNC: 143 MMOL/L (ref 136–145)
TROPONIN T SERPL HS-MCNC: 18 NG/L
WBC NRBC COR # BLD AUTO: 4.53 10*3/MM3 (ref 3.4–10.8)
WHOLE BLOOD HOLD COAG: NORMAL

## 2024-12-10 PROCEDURE — 82948 REAGENT STRIP/BLOOD GLUCOSE: CPT | Performed by: INTERNAL MEDICINE

## 2024-12-10 PROCEDURE — 25010000002 ONDANSETRON PER 1 MG: Performed by: INTERNAL MEDICINE

## 2024-12-10 PROCEDURE — 99222 1ST HOSP IP/OBS MODERATE 55: CPT | Performed by: INTERNAL MEDICINE

## 2024-12-10 PROCEDURE — 25010000002 HYDROMORPHONE 1 MG/ML SOLUTION: Performed by: INTERNAL MEDICINE

## 2024-12-10 PROCEDURE — 83605 ASSAY OF LACTIC ACID: CPT | Performed by: INTERNAL MEDICINE

## 2024-12-10 PROCEDURE — 25010000002 REMDESIVIR 100 MG RECONSTITUTED SOLUTION: Performed by: INTERNAL MEDICINE

## 2024-12-10 PROCEDURE — 80053 COMPREHEN METABOLIC PANEL: CPT | Performed by: INTERNAL MEDICINE

## 2024-12-10 PROCEDURE — 71045 X-RAY EXAM CHEST 1 VIEW: CPT

## 2024-12-10 PROCEDURE — 25010000002 LIDOCAINE PF 2% 2 % SOLUTION: Performed by: NURSE ANESTHETIST, CERTIFIED REGISTERED

## 2024-12-10 PROCEDURE — 43246 EGD PLACE GASTROSTOMY TUBE: CPT | Performed by: INTERNAL MEDICINE

## 2024-12-10 PROCEDURE — 25010000002 PROPOFOL 10 MG/ML EMULSION: Performed by: NURSE ANESTHETIST, CERTIFIED REGISTERED

## 2024-12-10 PROCEDURE — 25010000002 MORPHINE PER 10 MG: Performed by: INTERNAL MEDICINE

## 2024-12-10 PROCEDURE — 94799 UNLISTED PULMONARY SVC/PX: CPT

## 2024-12-10 PROCEDURE — 82948 REAGENT STRIP/BLOOD GLUCOSE: CPT

## 2024-12-10 PROCEDURE — 25810000003 LACTATED RINGERS PER 1000 ML: Performed by: INTERNAL MEDICINE

## 2024-12-10 PROCEDURE — 25510000002 DIATRIZOATE MEGLUMINE PER 1 ML: Performed by: INTERNAL MEDICINE

## 2024-12-10 PROCEDURE — 92611 MOTION FLUOROSCOPY/SWALLOW: CPT

## 2024-12-10 PROCEDURE — 94664 DEMO&/EVAL PT USE INHALER: CPT

## 2024-12-10 PROCEDURE — 99232 SBSQ HOSP IP/OBS MODERATE 35: CPT | Performed by: INTERNAL MEDICINE

## 2024-12-10 PROCEDURE — 25810000003 SODIUM CHLORIDE 0.9 % SOLUTION: Performed by: INTERNAL MEDICINE

## 2024-12-10 PROCEDURE — 84484 ASSAY OF TROPONIN QUANT: CPT | Performed by: INTERNAL MEDICINE

## 2024-12-10 PROCEDURE — 25010000002 LORAZEPAM PER 2 MG: Performed by: INTERNAL MEDICINE

## 2024-12-10 PROCEDURE — 25010000002 CEFAZOLIN PER 500 MG: Performed by: INTERNAL MEDICINE

## 2024-12-10 PROCEDURE — BD12YZZ FLUOROSCOPY OF STOMACH USING OTHER CONTRAST: ICD-10-PCS | Performed by: RADIOLOGY

## 2024-12-10 PROCEDURE — 85025 COMPLETE CBC W/AUTO DIFF WBC: CPT | Performed by: INTERNAL MEDICINE

## 2024-12-10 PROCEDURE — 49465 FLUORO EXAM OF G/COLON TUBE: CPT

## 2024-12-10 PROCEDURE — 97165 OT EVAL LOW COMPLEX 30 MIN: CPT

## 2024-12-10 PROCEDURE — 93005 ELECTROCARDIOGRAM TRACING: CPT | Performed by: INTERNAL MEDICINE

## 2024-12-10 PROCEDURE — 25010000002 HYDROMORPHONE PER 4 MG: Performed by: INTERNAL MEDICINE

## 2024-12-10 PROCEDURE — 25010000002 KETOROLAC TROMETHAMINE PER 15 MG: Performed by: INTERNAL MEDICINE

## 2024-12-10 PROCEDURE — 25010000002 ONDANSETRON PER 1 MG: Performed by: NURSE ANESTHETIST, CERTIFIED REGISTERED

## 2024-12-10 PROCEDURE — 74176 CT ABD & PELVIS W/O CONTRAST: CPT

## 2024-12-10 PROCEDURE — 0DH63UZ INSERTION OF FEEDING DEVICE INTO STOMACH, PERCUTANEOUS APPROACH: ICD-10-PCS | Performed by: INTERNAL MEDICINE

## 2024-12-10 RX ORDER — HYDROMORPHONE HYDROCHLORIDE 1 MG/ML
1 INJECTION, SOLUTION INTRAMUSCULAR; INTRAVENOUS; SUBCUTANEOUS EVERY 4 HOURS PRN
Status: DISCONTINUED | OUTPATIENT
Start: 2024-12-10 | End: 2024-12-10

## 2024-12-10 RX ORDER — ALUMINA, MAGNESIA, AND SIMETHICONE 2400; 2400; 240 MG/30ML; MG/30ML; MG/30ML
15 SUSPENSION ORAL EVERY 6 HOURS PRN
Status: DISCONTINUED | OUTPATIENT
Start: 2024-12-10 | End: 2024-12-12 | Stop reason: HOSPADM

## 2024-12-10 RX ORDER — CALCIUM CARBONATE 500 MG/1
2 TABLET, CHEWABLE ORAL 2 TIMES DAILY PRN
Status: DISCONTINUED | OUTPATIENT
Start: 2024-12-10 | End: 2024-12-12 | Stop reason: HOSPADM

## 2024-12-10 RX ORDER — DEXAMETHASONE 4 MG/1
6 TABLET ORAL DAILY
Status: DISCONTINUED | OUTPATIENT
Start: 2024-12-11 | End: 2024-12-12 | Stop reason: HOSPADM

## 2024-12-10 RX ORDER — POLYETHYLENE GLYCOL 3350 17 G/17G
17 POWDER, FOR SOLUTION ORAL DAILY PRN
Status: DISCONTINUED | OUTPATIENT
Start: 2024-12-10 | End: 2024-12-12 | Stop reason: HOSPADM

## 2024-12-10 RX ORDER — HYDROMORPHONE HYDROCHLORIDE 1 MG/ML
2 INJECTION, SOLUTION INTRAMUSCULAR; INTRAVENOUS; SUBCUTANEOUS EVERY 4 HOURS PRN
Status: DISCONTINUED | OUTPATIENT
Start: 2024-12-10 | End: 2024-12-11

## 2024-12-10 RX ORDER — BISACODYL 10 MG
10 SUPPOSITORY, RECTAL RECTAL DAILY PRN
Status: DISCONTINUED | OUTPATIENT
Start: 2024-12-10 | End: 2024-12-12 | Stop reason: HOSPADM

## 2024-12-10 RX ORDER — PROPOFOL 10 MG/ML
VIAL (ML) INTRAVENOUS AS NEEDED
Status: DISCONTINUED | OUTPATIENT
Start: 2024-12-10 | End: 2024-12-10 | Stop reason: SURG

## 2024-12-10 RX ORDER — AMOXICILLIN 250 MG
2 CAPSULE ORAL 2 TIMES DAILY PRN
Status: DISCONTINUED | OUTPATIENT
Start: 2024-12-10 | End: 2024-12-12 | Stop reason: HOSPADM

## 2024-12-10 RX ORDER — OXYCODONE HYDROCHLORIDE 5 MG/1
5 TABLET ORAL EVERY 6 HOURS PRN
Status: DISCONTINUED | OUTPATIENT
Start: 2024-12-10 | End: 2024-12-12 | Stop reason: HOSPADM

## 2024-12-10 RX ORDER — LOPERAMIDE HYDROCHLORIDE 2 MG/1
4 CAPSULE ORAL ONCE
Status: COMPLETED | OUTPATIENT
Start: 2024-12-10 | End: 2024-12-10

## 2024-12-10 RX ORDER — TEMAZEPAM 15 MG/1
15 CAPSULE ORAL NIGHTLY
Status: DISCONTINUED | OUTPATIENT
Start: 2024-12-10 | End: 2024-12-12 | Stop reason: HOSPADM

## 2024-12-10 RX ORDER — ALPRAZOLAM 0.25 MG/1
0.5 TABLET ORAL EVERY 12 HOURS PRN
Status: DISCONTINUED | OUTPATIENT
Start: 2024-12-10 | End: 2024-12-12

## 2024-12-10 RX ORDER — DEXAMETHASONE SODIUM PHOSPHATE 10 MG/ML
6 INJECTION, SOLUTION INTRAMUSCULAR; INTRAVENOUS DAILY
Status: DISCONTINUED | OUTPATIENT
Start: 2024-12-11 | End: 2024-12-12 | Stop reason: HOSPADM

## 2024-12-10 RX ORDER — HYDROMORPHONE HYDROCHLORIDE 1 MG/ML
1 INJECTION, SOLUTION INTRAMUSCULAR; INTRAVENOUS; SUBCUTANEOUS ONCE
Status: COMPLETED | OUTPATIENT
Start: 2024-12-10 | End: 2024-12-10

## 2024-12-10 RX ORDER — LIDOCAINE HYDROCHLORIDE 20 MG/ML
INJECTION, SOLUTION EPIDURAL; INFILTRATION; INTRACAUDAL; PERINEURAL AS NEEDED
Status: DISCONTINUED | OUTPATIENT
Start: 2024-12-10 | End: 2024-12-10 | Stop reason: SURG

## 2024-12-10 RX ORDER — LOPERAMIDE HYDROCHLORIDE 2 MG/1
4 CAPSULE ORAL ONCE
Status: DISCONTINUED | OUTPATIENT
Start: 2024-12-10 | End: 2024-12-10

## 2024-12-10 RX ORDER — ACETAMINOPHEN 325 MG/1
325 TABLET ORAL EVERY 6 HOURS PRN
Status: DISCONTINUED | OUTPATIENT
Start: 2024-12-10 | End: 2024-12-12 | Stop reason: HOSPADM

## 2024-12-10 RX ORDER — BISACODYL 5 MG/1
5 TABLET, DELAYED RELEASE ORAL DAILY PRN
Status: DISCONTINUED | OUTPATIENT
Start: 2024-12-10 | End: 2024-12-12 | Stop reason: HOSPADM

## 2024-12-10 RX ORDER — OXYCODONE HYDROCHLORIDE 5 MG/1
10 TABLET ORAL EVERY 6 HOURS PRN
Status: DISCONTINUED | OUTPATIENT
Start: 2024-12-10 | End: 2024-12-12 | Stop reason: HOSPADM

## 2024-12-10 RX ORDER — ONDANSETRON 2 MG/ML
4 INJECTION INTRAMUSCULAR; INTRAVENOUS ONCE
Status: COMPLETED | OUTPATIENT
Start: 2024-12-10 | End: 2024-12-10

## 2024-12-10 RX ADMIN — MORPHINE SULFATE 2 MG: 2 INJECTION, SOLUTION INTRAMUSCULAR; INTRAVENOUS at 06:36

## 2024-12-10 RX ADMIN — ARFORMOTEROL TARTRATE 15 MCG: 15 SOLUTION RESPIRATORY (INHALATION) at 07:50

## 2024-12-10 RX ADMIN — MORPHINE SULFATE 2 MG: 2 INJECTION, SOLUTION INTRAMUSCULAR; INTRAVENOUS at 11:05

## 2024-12-10 RX ADMIN — Medication 10 ML: at 21:27

## 2024-12-10 RX ADMIN — LOPERAMIDE HYDROCHLORIDE 4 MG: 2 CAPSULE ORAL at 22:38

## 2024-12-10 RX ADMIN — HYDROMORPHONE HYDROCHLORIDE 1 MG: 1 INJECTION, SOLUTION INTRAMUSCULAR; INTRAVENOUS; SUBCUTANEOUS at 16:27

## 2024-12-10 RX ADMIN — LIDOCAINE HYDROCHLORIDE 100 MG: 20 INJECTION, SOLUTION EPIDURAL; INFILTRATION; INTRACAUDAL; PERINEURAL at 12:42

## 2024-12-10 RX ADMIN — NICOTINE 1 PATCH: 21 PATCH, EXTENDED RELEASE TRANSDERMAL at 11:07

## 2024-12-10 RX ADMIN — HYDROMORPHONE HYDROCHLORIDE 2 MG: 1 INJECTION, SOLUTION INTRAMUSCULAR; INTRAVENOUS; SUBCUTANEOUS at 21:04

## 2024-12-10 RX ADMIN — ONDANSETRON 4 MG: 2 INJECTION INTRAMUSCULAR; INTRAVENOUS at 22:38

## 2024-12-10 RX ADMIN — BUDESONIDE 0.5 MG: 0.5 INHALANT RESPIRATORY (INHALATION) at 07:50

## 2024-12-10 RX ADMIN — SODIUM CHLORIDE 2000 MG: 9 INJECTION, SOLUTION INTRAVENOUS at 12:17

## 2024-12-10 RX ADMIN — HYDROMORPHONE HYDROCHLORIDE 1 MG: 1 INJECTION, SOLUTION INTRAMUSCULAR; INTRAVENOUS; SUBCUTANEOUS at 15:03

## 2024-12-10 RX ADMIN — LORAZEPAM 0.25 MG: 2 INJECTION INTRAMUSCULAR; INTRAVENOUS at 17:49

## 2024-12-10 RX ADMIN — HYDROMORPHONE HYDROCHLORIDE 1 MG: 1 INJECTION, SOLUTION INTRAMUSCULAR; INTRAVENOUS; SUBCUTANEOUS at 13:58

## 2024-12-10 RX ADMIN — OXYCODONE 10 MG: 5 TABLET ORAL at 22:38

## 2024-12-10 RX ADMIN — ARFORMOTEROL TARTRATE 15 MCG: 15 SOLUTION RESPIRATORY (INHALATION) at 19:54

## 2024-12-10 RX ADMIN — SODIUM CHLORIDE 100 MG: 9 INJECTION, SOLUTION INTRAVENOUS at 16:27

## 2024-12-10 RX ADMIN — Medication 10 ML: at 11:07

## 2024-12-10 RX ADMIN — DIATRIZOATE MEGLUMINE 30 ML: 300 INJECTION, SOLUTION INTRAVENOUS at 16:00

## 2024-12-10 RX ADMIN — SODIUM CHLORIDE 2000 MG: 9 INJECTION, SOLUTION INTRAVENOUS at 12:42

## 2024-12-10 RX ADMIN — ONDANSETRON 4 MG: 2 INJECTION INTRAMUSCULAR; INTRAVENOUS at 06:36

## 2024-12-10 RX ADMIN — PROPOFOL 50 MG: 10 INJECTION, EMULSION INTRAVENOUS at 12:42

## 2024-12-10 RX ADMIN — ONDANSETRON 4 MG: 2 INJECTION INTRAMUSCULAR; INTRAVENOUS at 12:17

## 2024-12-10 RX ADMIN — BUDESONIDE 0.5 MG: 0.5 INHALANT RESPIRATORY (INHALATION) at 19:54

## 2024-12-10 RX ADMIN — PROPOFOL 175 MCG/KG/MIN: 10 INJECTION, EMULSION INTRAVENOUS at 12:42

## 2024-12-10 RX ADMIN — KETOROLAC TROMETHAMINE 30 MG: 30 INJECTION, SOLUTION INTRAMUSCULAR; INTRAVENOUS at 17:40

## 2024-12-10 RX ADMIN — SODIUM CHLORIDE, SODIUM LACTATE, POTASSIUM CHLORIDE, CALCIUM CHLORIDE 75 ML/HR: 20; 30; 600; 310 INJECTION, SOLUTION INTRAVENOUS at 06:41

## 2024-12-10 NOTE — H&P (VIEW-ONLY)
[facility]  Initial Inpatient Consult Note    Referring Provider: Hospitalist    Reason for Consultation: PEG placement    Subjective     History of present illness:    59 y.o. female with a long medical history including throat cancer status post XRT is followed by Dr. Zelaya and Dr. Echols, Hodgkin's lymphoma lupus and coronary artery disease who I am asked to replace a G-tube.  She has had some recent aspiration and trouble with eating after her prior treatments and therefore I am requesting NG tube.  She reports her previous G-tube was removed about a year ago.  She denies any anticoagulation currently she was admitted here with COVID and shortness of breath which has improved.    Past Medical History:  Past Medical History:   Diagnosis Date    Anesthesia     REPORTS HAS GOT REAL EMOTIONAL AND HAS BECOME ANGRY BEFORE    Anxiety     Aortic stenosis, severe     HAS BIO VALVE REPLACED    Arthritis     Asthma     Back pain     Blood clotting disorder     Cancer     Cancer associated pain     HODGKINS LYMPHOMA    CHF (congestive heart failure)     Chronic low back pain with sciatica     Chronic pain disorder     COPD (chronic obstructive pulmonary disease)     Coronary artery disease     Diabetes mellitus     TYPE 2    Diabetes mellitus     Dyspnea on effort     GERD (gastroesophageal reflux disease)     H/O lumpectomy     H/O: hysterectomy     Hiatal hernia     History of degenerative disc disease     History of kidney stones     History of pulmonary embolus (PE)     History of transfusion     AS A CHILD NO TRANSFUSION REACTION    History of transfusion     Hodgkin's lymphoma     5/19/23  5 YEARS SINCE LAST CHEMO TX    Hypertension     Immobility     Liver disease     DENIES ANY CURRENT ISSUES    Lupus     Mitral valve prolapse     Nausea vomiting and diarrhea 3/7/2024    Panic disorder     Pelvic pain     Peripheral neuropathy     Personal history of DVT (deep vein thrombosis)     Presence  of intrathecal pump     PTSD (post-traumatic stress disorder)     Sacroiliac joint disease     SI (sacroiliac) joint inflammation     Sleep apnea     Venous insufficiency      Past Surgical History:  Past Surgical History:   Procedure Laterality Date    ABDOMINAL SURGERY      BACK SURGERY      SPINAL FUSION     BACK SURGERY      BLADDER REPAIR      CARDIAC CATHETERIZATION N/A 03/06/2019    Procedure: Valvuloplasty;  Surgeon: Wayne Johnson MD;  Location: [facility] INVASIVE LOCATION;  Service: Cardiology    CARDIAC CATHETERIZATION      CARDIAC CATHETERIZATION Left 5/31/2023    Procedure: Cardiac Catheterization/Vascular Study;  Surgeon: Poncho Reid MD;  Location: [facility] CATH INVASIVE LOCATION;  Service: Cardiovascular;  Laterality: Left;    CARDIAC SURGERY      CARDIAC VALVE REPLACEMENT  2021    CHOLECYSTECTOMY      COLONOSCOPY N/A 12/29/2021    Procedure: COLONOSCOPY;  Surgeon: Karine Orlando MD;  Location: [facility] ENDOSCOPY;  Service: Gastroenterology;  Laterality: N/A;  POOR PREP    COLONOSCOPY N/A 04/13/2022    Procedure: COLONOSCOPY WITH POLYPECTOMY;  Surgeon: Karine Orlando MD;  Location: [facility] ENDOSCOPY;  Service: Gastroenterology;  Laterality: N/A;  COLON POLYP, HEMORRHOIDS, POOR PREP    COLONOSCOPY      DIRECT LARYNGOSCOPY, ESOPHAGOSCOPY, BRONCHOSCOPY N/A 5/22/2023    Procedure: DIRECT LARYNGOSCOPY WITH BIOPSIES , ESOPHAGOSCOPY, BRONCHOSCOPY;  Surgeon: Ronnie Mcgarry MD;  Location: [facility] OR [facility];  Service: ENT;  Laterality: N/A;    ENDOSCOPY N/A 12/29/2021    Procedure: ESOPHAGOGASTRODUODENOSCOPY;  Surgeon: Karine Orlando MD;  Location: [facility] ENDOSCOPY;  Service: Gastroenterology;  Laterality: N/A;  ESOPHAGITIS, GASTRITIS, HIATAL HERNIA    ENDOSCOPY      ENDOSCOPY N/A 4/16/2024    Procedure: ESOPHAGOGASTRODUODENOSCOPY WITH BIOPSIES;  Surgeon: Karine Orlando MD;  Location: [facility] ENDOSCOPY;  Service: Gastroenterology;  Laterality: N/A;   ESOPHAGITIS, HIATAL HERNIA    HYSTERECTOMY      LYMPHADENECTOMY      PAIN PUMP INSERTION/REVISION N/A 10/18/2019    Procedure: PAIN PUMP INSERTION [facility] 10-18-19 VASQUEZ;  Surgeon: Horace Vasquez MD;  Location: [facility] OR;  Service: Pain Management    PAIN PUMP INSERTION/REVISION N/A 11/23/2020    Procedure: pain pump removal;  Surgeon: Horace Vasquez MD;  Location: [facility] OR;  Service: Pain Management;  Laterality: N/A;    PEG TUBE INSERTION N/A 7/26/2023    Procedure: PERCUTANEOUS ENDOSCOPIC GASTROSTOMY TUBE INSERTION;  Surgeon: Kody Mercer MD;  Location: [facility] ENDOSCOPY;  Service: General;  Laterality: N/A;  SUCCESSFUL PEG TUBE PLACE PLACEMENT    PORTACATH PLACEMENT      IN LEFT CHEST REPORTS THAT IT IS NOT FUNCTIONING    SKIN BIOPSY      TONSILLECTOMY      TUBAL ABDOMINAL LIGATION      TUMOR REMOVAL        Social History:   Social History     Tobacco Use    Smoking status: Every Day     Current packs/day: 1.50     Average packs/day: 1.5 packs/day for 45.9 years (68.9 ttl pk-yrs)     Types: Cigarettes     Start date: 1979     Passive exposure: Current    Smokeless tobacco: Never   Substance Use Topics    Alcohol use: Never      Family History:  Family History   Problem Relation Age of Onset    Heart failure Mother     Anxiety disorder Mother     Prostate cancer Father     Depression Sister     Bipolar disorder Sister     Pancreatic cancer Sister     ADD / ADHD Brother     Thyroid cancer Maternal Grandmother     Pancreatic cancer Paternal Grandmother     ADD / ADHD Grandson     ADD / ADHD Granddaughter     ADD / ADHD Nephew     Malig Hyperthermia Neg Hx        Home Meds:  Medications Prior to Admission   Medication Sig Dispense Refill Last Dose/Taking    acetaminophen (Tylenol) 325 MG tablet Take 1 tablet by mouth Every 6 (Six) Hours As Needed for Mild Pain, Headache or Fever.   Unknown    albuterol sulfate  (90 Base) MCG/ACT inhaler Inhale 2 puffs Every 4 (Four) Hours As Needed  for Wheezing. 18 g 2 Unknown    ALPRAZolam (XANAX) 0.25 MG tablet Take 2 tablets by mouth 2 (Two) Times a Day As Needed.   Unknown    cetirizine (zyrTEC) 10 MG tablet Take 1 tablet by mouth Daily.   Unknown    clopidogrel (PLAVIX) 75 MG tablet Take 1 tablet by mouth Daily. 90 tablet 2 Unknown    mupirocin (BACTROBAN) 2 % ointment Apply 1 Application topically to the appropriate area as directed 3 (Three) Times a Day. 30 g 2 Unknown    pain patient supplied pump by Intrathecal route Continuous. Type of Medication: Hydromorphone 15 mg/ml and Bupivacaine 0.5 mg/ml  [employer] [phone]  Provider:Dr. Boudreaux  Provider number: [phone]  Basal Dose: Hydromorphone 7.518 mg/day Bupivacaine 0.98386 mg/day  Pump capacity: 40ml  ( MAX of Hydromorphone 9.456 mg/ day; Bupivacaine 0.70961 mg /day)  Bolus Dose:Hydromorphone 0.500 mg, Bupivacaine 0.90768 mg  Max activations: 4 per day  Lockout 3 hours. 1 Bolus per every 3 hours   Next refill-  01/07/2025  Alarm date- 01/12/2025  Pump manufacture:[employer]   Unknown    predniSONE (DELTASONE) 20 MG tablet Take 2 tablets by mouth Daily.   Unknown     Current Meds:   arformoterol, 15 mcg, Nebulization, BID - RT  budesonide, 0.5 mg, Nebulization, BID - RT  ceFAZolin, 2,000 mg, Intravenous, Once  dexAMETHasone, 6 mg, Oral, Daily   Or  dexAMETHasone, 6 mg, Intravenous, Daily  enoxaparin, 40 mg, Subcutaneous, Daily  famotidine, 20 mg, Intravenous, Daily  insulin regular, 2-7 Units, Subcutaneous, Q6H  nicotine, 1 patch, Transdermal, Q24H  remdesivir, 100 mg, Intravenous, Q24H  sodium chloride, 10 mL, Intravenous, Q12H      Allergies:  Allergies   Allergen Reactions    Amoxicillin Shortness Of Breath     Has tolerated Cefazolin, Ceftriaxone, and Cefepime -Jermaine Montez [facility]    Ceclor [Cefaclor] Shortness Of Breath     Has tolerated Cefazolin, Ceftriaxone, and Cefepime -Jermaine Montez RP      Penicillins Shortness Of Breath     Has tolerated Cefazolin, Ceftriaxone, and Cefepime  -Jermaine Montez, [facility]    Sulfa Antibiotics Rash    Valium [Diazepam] Mental Status Change     DEPRESSED    Ambien [Zolpidem] Mental Status Change    Aspirin GI Intolerance    Ativan [Lorazepam] Mental Status Change    Benadryl [Diphenhydramine] Anxiety     AND MAKES HER HYPER    Biaxin [Clarithromycin] Unknown - Low Severity    Cephalexin Unknown - Low Severity    Clindamycin Unknown - Low Severity    Compazine [Prochlorperazine Edisylate] Rash    Contrast Dye (Echo Or Unknown Ct/Mr) Other (See Comments)     Caused pain in her arm    Doxycycline Rash    Nsaids GI Intolerance    Phenergan [Promethazine Hcl] GI Intolerance    Promethazine GI Intolerance    Vancomycin Itching     Review of Systems  Pertinent items are noted in HPI, all other systems reviewed and negative         Vital Signs  Temp:  [97.2 °F (36.2 °C)-98.4 °F (36.9 °C)] 97.8 °F (36.6 °C)  Heart Rate:  [60-81] 76  Resp:  [16-20] 16  BP: (101-134)/(72-88) 131/77  Physical Exam:  General Appearance:    Alert, cooperative, in no acute distress   Head:    Normocephalic, without obvious abnormality, atraumatic   Eyes:          conjunctivae and sclerae normal, no   icterus   Throat:   no thrush, oral mucosa moist   Neck:   Supple, no adenopathy   Lungs:     Clear to auscultation bilaterally    Heart:    Regular rhythm and normal rate    Chest Wall:    No abnormalities observed   Abdomen:     Soft, nondistended, prior G-tube site noted in the just left of midline upper abdomen.  Nontender; normal bowel sounds   Extremities:   no edema, no redness   Skin:   No bruising or rash   Psychiatric:  normal mood and insight     Results Review:  [x]  Laboratory   [x]  Radiology  []  Pathology      I reviewed the patient's new clinical results.    Results from last 7 days   Lab Units 12/07/24  0932   WBC 10*3/mm3 5.69   HEMOGLOBIN g/dL 12.4   HEMATOCRIT % 41.5   PLATELETS 10*3/mm3 145     Results from last 7 days   Lab Units 12/10/24  0622 12/09/24  0526 12/08/24  0428    SODIUM mmol/L 141 141 140   POTASSIUM mmol/L 4.1 4.2 4.4   CHLORIDE mmol/L 102 102 100   CO2 mmol/L 31.5* 28.8 31.9*   BUN mg/dL 14 20 15   CREATININE mg/dL 0.50* 0.52* 0.45*   CALCIUM mg/dL 8.4* 8.4* 8.7   BILIRUBIN mg/dL 0.3 0.2 0.2   ALK PHOS U/L 44 47 53   ALT (SGPT) U/L 12 13 11   AST (SGOT) U/L 20 21 20   GLUCOSE mg/dL 89 131* 91         Lab Results   Lab Value Date/Time    LIPASE 22 07/20/2024 0822    LIPASE 81 (H) 05/29/2024 2309    LIPASE 18 04/27/2024 1043    LIPASE 39 03/07/2024 1331    LIPASE 10 (L) 02/06/2024 0241    LIPASE 8 (L) 05/26/2023 1200    LIPASE 17 11/27/2022 0456    LIPASE 17 09/05/2022 2046    LIPASE 24 05/08/2022 2159    LIPASE 23 01/21/2022 1304    LIPASE 21 01/05/2019 1409       Radiology:  FL Video Swallow With Speech Single Contrast    Result Date: 12/10/2024  Impression: Tracheal aspiration of nectar and honey thick liquid barium and applesauce with barium Please consult speech pathology report for further details regarding the exam and for dietary recommendations. Report dictated by: Liz Pisano  I have personally reviewed this case and agree with the findings above: Electronically Signed: Manuelito Almanzar MD  12/10/2024 9:08 AM EST  Workstation ID: [id number]     Assessment & Plan       COVID-19 virus infection    AMS (altered mental status)    Hypoxemia  Dysphagia  Recurrent aspiration    Plan:  Will plan for EGD and PEG placement today.  I discussed risk and benefits of EGD with PEG placement with the patient and her daughter.  They are willing to proceed.  She is being given 2 g of Ancef after discussion of her multiple allergies with pharmacy and reportedly she has tolerated this in the past.      I discussed the patients findings and my recommendations with patient, family, and nursing staff.    Rdoger Ortega MD

## 2024-12-10 NOTE — PLAN OF CARE
Goal Outcome Evaluation:           Progress: no change  Outcome Evaluation: Patient is alert and oriented, able to make needs known. On 3L NC, patient refused to wear, educated on wearing the nasal cannula. NPO for PEG tube placement today. PEG tube placed, complaints of 10/10 pain after pain medication administration. Complaints of chest pain, RRT called. See orders placed during RRT. Daughter at bedside.    Noemy Vang RN

## 2024-12-10 NOTE — PLAN OF CARE
Goal Outcome Evaluation:  Plan of Care Reviewed With: patient        Progress: no change  Outcome Evaluation: Patient refused to wear bipap last night. Unit is on standby in the room. Currently on room air. Patient tolerated breathing treatment this morning.

## 2024-12-10 NOTE — ANESTHESIA PREPROCEDURE EVALUATION
Anesthesia Evaluation     Patient summary reviewed and Nursing notes reviewed   NPO Solid Status: > 6 hours  NPO Liquid Status: > 2 hours           Airway   Mallampati: IV  TM distance: >3 FB  Neck ROM: limited  Possible difficult intubation  Comment: S/p radiation to larynx last year- possible difficult intubation  Dental    (+) edentulous    Pulmonary    (+) pulmonary embolism, a smoker Current, Abstained day of surgery, COPD (PRN inhaler use- receiving nebs on floor), asthma,home oxygen (PRN), shortness of breath, sleep apnea, rhonchi    ROS comment: COVID positive  Cardiovascular   Exercise tolerance: poor (<4 METS)    ECG reviewed  PT is on anticoagulation therapy  Rhythm: regular  Rate: normal    (+) hypertension, valvular problems/murmurs (s/p TAVR) AS, past MI  >12 months, CAD, CHF Systolic <55%, PVD      Neuro/Psych  (+) numbness, psychiatric history Anxiety, Depression and PTSD  GI/Hepatic/Renal/Endo    (+) hiatal hernia, GERD poorly controlled, liver disease (hepatic steatosis), renal disease- CRI, diabetes mellitus type 2    Musculoskeletal     (+) back pain, neck pain  Abdominal    Substance History      OB/GYN          Other   arthritis,   history of cancer (laryngeal, hodgkins) active    ROS/Med Hx Other: Last dose Plavix ?     Hx laryngeal CA, here for peg tube placement     EKG 12/07/24: HR 85,   Sinus rhythm  Left atrial enlargement  Nonspecific intraventricular conduction delay  Anteroseptal infarct, age indeterminate  Abnormal T, consider ischemia, lateral leads  ST elevation, consider inferior injury    ECHO 05/26/23:   ·  Left ventricular ejection fraction appears to be 46 - 50%.  ·  Left ventricular wall thickness is consistent with mild concentric hypertrophy.  ·  Left ventricular diastolic function was indeterminate.  ·  There is a TAVR valve present.  ·  Moderate to severe tricuspid valve regurgitation is present.  ·  Estimated right ventricular systolic pressure from tricuspid  "regurgitation is mildly elevated (35-45 mmHg).     There were no apparent intracardiac masses, vegetations or thrombi.    PET scan 09/10/24:   IMPRESSION:  No definite abnormal activity is seen in the neck to suggest recurrent disease. No abnormal adenopathy. There may be post radiation changes as seen on CT neck 7/20/2024 and PET scan 5/6/2024.     Compared with chest CT 8/22/2024, there has been near complete resolution in the right middle lobe and right lower lobe pneumonia.      There are several new nodules and nodular densities seen in the right lower lobe measuring up to about 0.8 cm on image 166. These are not hypermetabolic and were not seen on 7/20/2024. There may be sequela from previous pneumonia. Recommend continued   follow-up.     No other definite abnormal activity.    Has been off Plavix \"a long time\"                        Anesthesia Plan    ASA 4     general   total IV anesthesia  (Total IV Anesthesia    Patient understands anesthesia not responsible for dental damage.      Discussed risks with pt including aspiration, allergic reactions, apnea, advanced airway placement. Pt verbalized understanding. All questions answered.   )  intravenous induction     Anesthetic plan, risks, benefits, and alternatives have been provided, discussed and informed consent has been obtained with: patient.    Plan discussed with CRNA.        CODE STATUS:    Code Status (Patient has no pulse and is not breathing): CPR (Attempt to Resuscitate)  Medical Interventions (Patient has pulse or is breathing): Full Support      "

## 2024-12-10 NOTE — CONSULTS
[facility]  Initial Inpatient Consult Note    Referring Provider: Hospitalist    Reason for Consultation: PEG placement    Subjective     History of present illness:    59 y.o. female with a long medical history including throat cancer status post XRT is followed by Dr. Zelaya and Dr. Echols, Hodgkin's lymphoma lupus and coronary artery disease who I am asked to replace a G-tube.  She has had some recent aspiration and trouble with eating after her prior treatments and therefore I am requesting NG tube.  She reports her previous G-tube was removed about a year ago.  She denies any anticoagulation currently she was admitted here with COVID and shortness of breath which has improved.    Past Medical History:  Past Medical History:   Diagnosis Date    Anesthesia     REPORTS HAS GOT REAL EMOTIONAL AND HAS BECOME ANGRY BEFORE    Anxiety     Aortic stenosis, severe     HAS BIO VALVE REPLACED    Arthritis     Asthma     Back pain     Blood clotting disorder     Cancer     Cancer associated pain     HODGKINS LYMPHOMA    CHF (congestive heart failure)     Chronic low back pain with sciatica     Chronic pain disorder     COPD (chronic obstructive pulmonary disease)     Coronary artery disease     Diabetes mellitus     TYPE 2    Diabetes mellitus     Dyspnea on effort     GERD (gastroesophageal reflux disease)     H/O lumpectomy     H/O: hysterectomy     Hiatal hernia     History of degenerative disc disease     History of kidney stones     History of pulmonary embolus (PE)     History of transfusion     AS A CHILD NO TRANSFUSION REACTION    History of transfusion     Hodgkin's lymphoma     5/19/23  5 YEARS SINCE LAST CHEMO TX    Hypertension     Immobility     Liver disease     DENIES ANY CURRENT ISSUES    Lupus     Mitral valve prolapse     Nausea vomiting and diarrhea 3/7/2024    Panic disorder     Pelvic pain     Peripheral neuropathy     Personal history of DVT (deep vein thrombosis)     Presence  of intrathecal pump     PTSD (post-traumatic stress disorder)     Sacroiliac joint disease     SI (sacroiliac) joint inflammation     Sleep apnea     Venous insufficiency      Past Surgical History:  Past Surgical History:   Procedure Laterality Date    ABDOMINAL SURGERY      BACK SURGERY      SPINAL FUSION     BACK SURGERY      BLADDER REPAIR      CARDIAC CATHETERIZATION N/A 03/06/2019    Procedure: Valvuloplasty;  Surgeon: Wayne Johnson MD;  Location: [facility] INVASIVE LOCATION;  Service: Cardiology    CARDIAC CATHETERIZATION      CARDIAC CATHETERIZATION Left 5/31/2023    Procedure: Cardiac Catheterization/Vascular Study;  Surgeon: Poncho Reid MD;  Location: [facility] CATH INVASIVE LOCATION;  Service: Cardiovascular;  Laterality: Left;    CARDIAC SURGERY      CARDIAC VALVE REPLACEMENT  2021    CHOLECYSTECTOMY      COLONOSCOPY N/A 12/29/2021    Procedure: COLONOSCOPY;  Surgeon: Karine Orlando MD;  Location: [facility] ENDOSCOPY;  Service: Gastroenterology;  Laterality: N/A;  POOR PREP    COLONOSCOPY N/A 04/13/2022    Procedure: COLONOSCOPY WITH POLYPECTOMY;  Surgeon: Karine Orlando MD;  Location: [facility] ENDOSCOPY;  Service: Gastroenterology;  Laterality: N/A;  COLON POLYP, HEMORRHOIDS, POOR PREP    COLONOSCOPY      DIRECT LARYNGOSCOPY, ESOPHAGOSCOPY, BRONCHOSCOPY N/A 5/22/2023    Procedure: DIRECT LARYNGOSCOPY WITH BIOPSIES , ESOPHAGOSCOPY, BRONCHOSCOPY;  Surgeon: Ronnie Mcgarry MD;  Location: [facility] [place] [facility];  Service: ENT;  Laterality: N/A;    ENDOSCOPY N/A 12/29/2021    Procedure: ESOPHAGOGASTRODUODENOSCOPY;  Surgeon: Karine Orlando MD;  Location: [facility] ENDOSCOPY;  Service: Gastroenterology;  Laterality: N/A;  ESOPHAGITIS, GASTRITIS, HIATAL HERNIA    ENDOSCOPY      ENDOSCOPY N/A 4/16/2024    Procedure: ESOPHAGOGASTRODUODENOSCOPY WITH BIOPSIES;  Surgeon: Karine Orlando MD;  Location: [facility] ENDOSCOPY;  Service: Gastroenterology;  Laterality: N/A;   ESOPHAGITIS, HIATAL HERNIA    HYSTERECTOMY      LYMPHADENECTOMY      PAIN PUMP INSERTION/REVISION N/A 10/18/2019    Procedure: PAIN PUMP INSERTION [facility] 10-18-19 VASQUEZ;  Surgeon: Horace Vasquez MD;  Location: [facility] OR;  Service: Pain Management    PAIN PUMP INSERTION/REVISION N/A 11/23/2020    Procedure: pain pump removal;  Surgeon: Horace Vasquez MD;  Location: [facility] OR;  Service: Pain Management;  Laterality: N/A;    PEG TUBE INSERTION N/A 7/26/2023    Procedure: PERCUTANEOUS ENDOSCOPIC GASTROSTOMY TUBE INSERTION;  Surgeon: Kody Mercer MD;  Location: [facility] ENDOSCOPY;  Service: General;  Laterality: N/A;  SUCCESSFUL PEG TUBE PLACE PLACEMENT    PORTACATH PLACEMENT      IN LEFT CHEST REPORTS THAT IT IS NOT FUNCTIONING    SKIN BIOPSY      TONSILLECTOMY      TUBAL ABDOMINAL LIGATION      TUMOR REMOVAL        Social History:   Social History     Tobacco Use    Smoking status: Every Day     Current packs/day: 1.50     Average packs/day: 1.5 packs/day for 45.9 years (68.9 ttl pk-yrs)     Types: Cigarettes     Start date: 1979     Passive exposure: Current    Smokeless tobacco: Never   Substance Use Topics    Alcohol use: Never      Family History:  Family History   Problem Relation Age of Onset    Heart failure Mother     Anxiety disorder Mother     Prostate cancer Father     Depression Sister     Bipolar disorder Sister     Pancreatic cancer Sister     ADD / ADHD Brother     Thyroid cancer Maternal Grandmother     Pancreatic cancer Paternal Grandmother     ADD / ADHD Grandson     ADD / ADHD Granddaughter     ADD / ADHD Nephew     Malig Hyperthermia Neg Hx        Home Meds:  Medications Prior to Admission   Medication Sig Dispense Refill Last Dose/Taking    acetaminophen (Tylenol) 325 MG tablet Take 1 tablet by mouth Every 6 (Six) Hours As Needed for Mild Pain, Headache or Fever.   Unknown    albuterol sulfate  (90 Base) MCG/ACT inhaler Inhale 2 puffs Every 4 (Four) Hours As Needed  for Wheezing. 18 g 2 Unknown    ALPRAZolam (XANAX) 0.25 MG tablet Take 2 tablets by mouth 2 (Two) Times a Day As Needed.   Unknown    cetirizine (zyrTEC) 10 MG tablet Take 1 tablet by mouth Daily.   Unknown    clopidogrel (PLAVIX) 75 MG tablet Take 1 tablet by mouth Daily. 90 tablet 2 Unknown    mupirocin (BACTROBAN) 2 % ointment Apply 1 Application topically to the appropriate area as directed 3 (Three) Times a Day. 30 g 2 Unknown    pain patient supplied pump by Intrathecal route Continuous. Type of Medication: Hydromorphone 15 mg/ml and Bupivacaine 0.5 mg/ml  [employer] [phone]  Provider:Dr. Boudreaux  Provider number: [phone]  Basal Dose: Hydromorphone 7.518 mg/day Bupivacaine 0.63116 mg/day  Pump capacity: 40ml  ( MAX of Hydromorphone 9.456 mg/ day; Bupivacaine 0.23487 mg /day)  Bolus Dose:Hydromorphone 0.500 mg, Bupivacaine 0.49673 mg  Max activations: 4 per day  Lockout 3 hours. 1 Bolus per every 3 hours   Next refill-  01/07/2025  Alarm date- 01/12/2025  Pump manufacture:[employer]   Unknown    predniSONE (DELTASONE) 20 MG tablet Take 2 tablets by mouth Daily.   Unknown     Current Meds:   arformoterol, 15 mcg, Nebulization, BID - RT  budesonide, 0.5 mg, Nebulization, BID - RT  ceFAZolin, 2,000 mg, Intravenous, Once  dexAMETHasone, 6 mg, Oral, Daily   Or  dexAMETHasone, 6 mg, Intravenous, Daily  enoxaparin, 40 mg, Subcutaneous, Daily  famotidine, 20 mg, Intravenous, Daily  insulin regular, 2-7 Units, Subcutaneous, Q6H  nicotine, 1 patch, Transdermal, Q24H  remdesivir, 100 mg, Intravenous, Q24H  sodium chloride, 10 mL, Intravenous, Q12H      Allergies:  Allergies   Allergen Reactions    Amoxicillin Shortness Of Breath     Has tolerated Cefazolin, Ceftriaxone, and Cefepime -Jermaine Montez [facility]    Ceclor [Cefaclor] Shortness Of Breath     Has tolerated Cefazolin, Ceftriaxone, and Cefepime -Jermaine Montez [facility]      Penicillins Shortness Of Breath     Has tolerated Cefazolin, Ceftriaxone, and Cefepime  -Jermaine Montez, [facility]    Sulfa Antibiotics Rash    Valium [Diazepam] Mental Status Change     DEPRESSED    Ambien [Zolpidem] Mental Status Change    Aspirin GI Intolerance    Ativan [Lorazepam] Mental Status Change    Benadryl [Diphenhydramine] Anxiety     AND MAKES HER HYPER    Biaxin [Clarithromycin] Unknown - Low Severity    Cephalexin Unknown - Low Severity    Clindamycin Unknown - Low Severity    Compazine [Prochlorperazine Edisylate] Rash    Contrast Dye (Echo Or Unknown Ct/Mr) Other (See Comments)     Caused pain in her arm    Doxycycline Rash    Nsaids GI Intolerance    Phenergan [Promethazine Hcl] GI Intolerance    Promethazine GI Intolerance    Vancomycin Itching     Review of Systems  Pertinent items are noted in HPI, all other systems reviewed and negative         Vital Signs  Temp:  [97.2 °F (36.2 °C)-98.4 °F (36.9 °C)] 97.8 °F (36.6 °C)  Heart Rate:  [60-81] 76  Resp:  [16-20] 16  BP: (101-134)/(72-88) 131/77  Physical Exam:  General Appearance:    Alert, cooperative, in no acute distress   Head:    Normocephalic, without obvious abnormality, atraumatic   Eyes:          conjunctivae and sclerae normal, no   icterus   Throat:   no thrush, oral mucosa moist   Neck:   Supple, no adenopathy   Lungs:     Clear to auscultation bilaterally    Heart:    Regular rhythm and normal rate    Chest Wall:    No abnormalities observed   Abdomen:     Soft, nondistended, prior G-tube site noted in the just left of midline upper abdomen.  Nontender; normal bowel sounds   Extremities:   no edema, no redness   Skin:   No bruising or rash   Psychiatric:  normal mood and insight     Results Review:  [x]  Laboratory   [x]  Radiology  []  Pathology      I reviewed the patient's new clinical results.    Results from last 7 days   Lab Units 12/07/24  0932   WBC 10*3/mm3 5.69   HEMOGLOBIN g/dL 12.4   HEMATOCRIT % 41.5   PLATELETS 10*3/mm3 145     Results from last 7 days   Lab Units 12/10/24  0622 12/09/24  0526 12/08/24  0428    SODIUM mmol/L 141 141 140   POTASSIUM mmol/L 4.1 4.2 4.4   CHLORIDE mmol/L 102 102 100   CO2 mmol/L 31.5* 28.8 31.9*   BUN mg/dL 14 20 15   CREATININE mg/dL 0.50* 0.52* 0.45*   CALCIUM mg/dL 8.4* 8.4* 8.7   BILIRUBIN mg/dL 0.3 0.2 0.2   ALK PHOS U/L 44 47 53   ALT (SGPT) U/L 12 13 11   AST (SGOT) U/L 20 21 20   GLUCOSE mg/dL 89 131* 91         Lab Results   Lab Value Date/Time    LIPASE 22 07/20/2024 0822    LIPASE 81 (H) 05/29/2024 2309    LIPASE 18 04/27/2024 1043    LIPASE 39 03/07/2024 1331    LIPASE 10 (L) 02/06/2024 0241    LIPASE 8 (L) 05/26/2023 1200    LIPASE 17 11/27/2022 0456    LIPASE 17 09/05/2022 2046    LIPASE 24 05/08/2022 2159    LIPASE 23 01/21/2022 1304    LIPASE 21 01/05/2019 1409       Radiology:  FL Video Swallow With Speech Single Contrast    Result Date: 12/10/2024  Impression: Tracheal aspiration of nectar and honey thick liquid barium and applesauce with barium Please consult speech pathology report for further details regarding the exam and for dietary recommendations. Report dictated by: Liz Pisano  I have personally reviewed this case and agree with the findings above: Electronically Signed: Manuelito Almanzar MD  12/10/2024 9:08 AM EST  Workstation ID: [id number]     Assessment & Plan       COVID-19 virus infection    AMS (altered mental status)    Hypoxemia  Dysphagia  Recurrent aspiration    Plan:  Will plan for EGD and PEG placement today.  I discussed risk and benefits of EGD with PEG placement with the patient and her daughter.  They are willing to proceed.  She is being given 2 g of Ancef after discussion of her multiple allergies with pharmacy and reportedly she has tolerated this in the past.      I discussed the patients findings and my recommendations with patient, family, and nursing staff.    Rodger Ortega MD

## 2024-12-10 NOTE — ANESTHESIA POSTPROCEDURE EVALUATION
Patient: Isha Llanes    Procedure Summary       Date: 12/10/24 Room / Location: [facility] ENDOSCOPY 2 / [facility] ENDOSCOPY    Anesthesia Start: 1236 Anesthesia Stop: 1314    Procedure: ESOPHAGOGASTRODUODENOSCOPY WITH PERCUTANEOUS ENDOSCOPIC GASTROSTOMY TUBE INSERTION WITH ANESTHESIA Diagnosis:       AMS (altered mental status)      (AMS (altered mental status) [R41.82])    Surgeons: Rodger Ortega MD Provider: Eris Fonseca CRNA    Anesthesia Type: general ASA Status: 4            Anesthesia Type: general    Vitals  Vitals Value Taken Time   /83 12/10/24 1312   Temp 36.1 °C (96.9 °F) 12/10/24 1312   Pulse 82 12/10/24 1335   Resp 20 12/10/24 1312   SpO2 97 % 12/10/24 1335   Vitals shown include unfiled device data.        Post Anesthesia Care and Evaluation    Patient location during evaluation: bedside  Patient participation: complete - patient participated  Level of consciousness: awake  Pain score: 5  Pain management: adequate    Airway patency: patent  Anesthetic complications: No anesthetic complications  PONV Status: controlled  Cardiovascular status: acceptable and stable  Respiratory status: acceptable    Comments: Patient returning to inpatient room. Complains of moderate pain at surgical site, Dr. Ortega assessed patient and ordered Dilaudid 1mg x1 dose prior to returning to floor.

## 2024-12-10 NOTE — THERAPY EVALUATION
Patient Name: Isha Llanes  : 1965    MRN: [id number]                              Today's Date: 12/10/2024       Admit Date: 2024    Visit Dx:     ICD-10-CM ICD-9-CM   1. Shortness of breath  R06.02 786.05   2. COVID-19  U07.1 079.89   3. Oropharyngeal dysphagia  R13.12 787.22   4. Decreased activities of daily living (ADL)  Z78.9 V49.89     Patient Active Problem List   Diagnosis    Septic shock    Acute MI    Cancer associated pain    Presence of intrathecal pump    Chronic low back pain with bilateral sciatica    Sacroiliac inflammation    Chronic pain syndrome    Pelvic pain    Aortic stenosis, severe    Dyspnea on effort    Other pulmonary embolism without acute cor pulmonale    Nonrheumatic aortic valve stenosis    Hip pain    Anxiety disorder    Allergic rhinitis due to allergen    Arthritis    Asthma    Kidney disease    CHF (congestive heart failure)    Chronic obstructive pulmonary disease    Diabetes mellitus, type 2    Depression    GERD (gastroesophageal reflux disease)    S/P TAVR (transcatheter aortic valve replacement)    Limited mobility    Venous hypertension of both lower extremities    Nicotine dependence    Mitral valve prolapse    Lupus (systemic lupus erythematosus)    Long term current use of anticoagulant therapy    History of Hodgkin's lymphoma    Hepatic steatosis    Heart murmur    CAD (coronary artery disease)    Non-healing wound of lower extremity, subsequent encounter    MSSA (methicillin susceptible Staphylococcus aureus) infection    Sepsis    Change in bowel habits    Nausea    Venous stasis ulcer of right calf    Venous stasis ulcer of left calf    Noncompliance    Peripheral vascular disease    Obesity with body mass index 30 or greater    Neurologic disorder    Neck pain    Limb pain    Hiatal hernia    Bladder disorder    Frailty    Cellulitis of right foot    Bowel disease    Atrophy of skin    Breast lump    Encounter for care related to vascular access  port    Primary osteoarthritis of right hip    Tobacco abuse    Laryngeal cancer    Throat pain    Voice hoarseness    Hodgkin lymphoma    Malignant neoplasm of supraglottis    Dysphagia    Nausea vomiting and diarrhea    Weight loss, abnormal    Epigastric pain    Nausea and vomiting    History of laryngeal cancer    Anorexia    AMS (altered mental status)    Acute-on-chronic respiratory failure    UTI (urinary tract infection)    Anterior neck pain    COVID-19 virus infection    Hypoxemia     Past Medical History:   Diagnosis Date    Anesthesia     REPORTS HAS GOT REAL EMOTIONAL AND HAS BECOME ANGRY BEFORE    Anxiety     Aortic stenosis, severe     HAS BIO VALVE REPLACED    Arthritis     Asthma     Back pain     Blood clotting disorder     Cancer     Cancer associated pain     HODGKINS LYMPHOMA    CHF (congestive heart failure)     Chronic low back pain with sciatica     Chronic pain disorder     COPD (chronic obstructive pulmonary disease)     Coronary artery disease     Diabetes mellitus     TYPE 2    Diabetes mellitus     Dyspnea on effort     GERD (gastroesophageal reflux disease)     H/O lumpectomy     H/O: hysterectomy     Hiatal hernia     History of degenerative disc disease     History of kidney stones     History of pulmonary embolus (PE)     History of transfusion     AS A CHILD NO TRANSFUSION REACTION    History of transfusion     Hodgkin's lymphoma     5/19/23  5 YEARS SINCE LAST CHEMO TX    Hypertension     Immobility     Liver disease     DENIES ANY CURRENT ISSUES    Lupus     Mitral valve prolapse     Nausea vomiting and diarrhea 3/7/2024    Panic disorder     Pelvic pain     Peripheral neuropathy     Personal history of DVT (deep vein thrombosis)     Presence of intrathecal pump     PTSD (post-traumatic stress disorder)     Sacroiliac joint disease     SI (sacroiliac) joint inflammation     Sleep apnea     Venous insufficiency      Past Surgical History:   Procedure Laterality Date    ABDOMINAL  SURGERY      BACK SURGERY      SPINAL FUSION     BACK SURGERY      BLADDER REPAIR      CARDIAC CATHETERIZATION N/A 03/06/2019    Procedure: Valvuloplasty;  Surgeon: Wayne Johnson MD;  Location: [facility] CATH INVASIVE LOCATION;  Service: Cardiology    CARDIAC CATHETERIZATION      CARDIAC CATHETERIZATION Left 5/31/2023    Procedure: Cardiac Catheterization/Vascular Study;  Surgeon: Poncho Reid MD;  Location: [facility] CATH INVASIVE LOCATION;  Service: Cardiovascular;  Laterality: Left;    CARDIAC SURGERY      CARDIAC VALVE REPLACEMENT  2021    CHOLECYSTECTOMY      COLONOSCOPY N/A 12/29/2021    Procedure: COLONOSCOPY;  Surgeon: Karine Orlando MD;  Location: [facility] ENDOSCOPY;  Service: Gastroenterology;  Laterality: N/A;  POOR PREP    COLONOSCOPY N/A 04/13/2022    Procedure: COLONOSCOPY WITH POLYPECTOMY;  Surgeon: Karine Orlando MD;  Location: [facility] ENDOSCOPY;  Service: Gastroenterology;  Laterality: N/A;  COLON POLYP, HEMORRHOIDS, POOR PREP    COLONOSCOPY      DIRECT LARYNGOSCOPY, ESOPHAGOSCOPY, BRONCHOSCOPY N/A 5/22/2023    Procedure: DIRECT LARYNGOSCOPY WITH BIOPSIES , ESOPHAGOSCOPY, BRONCHOSCOPY;  Surgeon: Ronnie Mcgarry MD;  Location: [facility];  Service: ENT;  Laterality: N/A;    ENDOSCOPY N/A 12/29/2021    Procedure: ESOPHAGOGASTRODUODENOSCOPY;  Surgeon: Karine Orlando MD;  Location: [facility] ENDOSCOPY;  Service: Gastroenterology;  Laterality: N/A;  ESOPHAGITIS, GASTRITIS, HIATAL HERNIA    ENDOSCOPY      ENDOSCOPY N/A 4/16/2024    Procedure: ESOPHAGOGASTRODUODENOSCOPY WITH BIOPSIES;  Surgeon: Karine Orlando MD;  Location: [facility] ENDOSCOPY;  Service: Gastroenterology;  Laterality: N/A;  ESOPHAGITIS, HIATAL HERNIA    HYSTERECTOMY      LYMPHADENECTOMY      PAIN PUMP INSERTION/REVISION N/A 10/18/2019    Procedure: PAIN PUMP INSERTION [facility] 10-18[facility];  Surgeon: Horace Rios MD;  Location: [facility];  Service: Pain Management    PAIN PUMP  INSERTION/REVISION N/A 11/23/2020    Procedure: pain pump removal;  Surgeon: Horace Rios MD;  Location:  [facility];  Service: Pain Management;  Laterality: N/A;    PEG TUBE INSERTION N/A 7/26/2023    Procedure: PERCUTANEOUS ENDOSCOPIC GASTROSTOMY TUBE INSERTION;  Surgeon: Kody Mercer MD;  Location:  [facility];  Service: General;  Laterality: N/A;  SUCCESSFUL PEG TUBE PLACE PLACEMENT    PORTACATH PLACEMENT      IN LEFT CHEST REPORTS THAT IT IS NOT FUNCTIONING    SKIN BIOPSY      TONSILLECTOMY      TUBAL ABDOMINAL LIGATION      TUMOR REMOVAL        General Information       Row Name 12/10/24 1152          OT Time and Intention    Document Type evaluation  -AV     Mode of Treatment individual therapy;occupational therapy  -AV       Row Name 12/10/24 1152          General Information    Patient Profile Reviewed yes  -AV     Prior Level of Function --  Per EMR review independent ADLs: At baseline in August.  Independent wheelchair transfers.  Further specifics unknown  -AV     Existing Precautions/Restrictions fall;NPO  Enhanced airborne isolation: COVID+  -AV     Barriers to Rehab none identified  -AV       Row Name 12/10/24 1152          Occupational Profile    Reason for Services/Referral (Occupational Profile) Patient is a 59 year old malefemale admitted [facility] on [facility]/2024 with shortness of air and lethargy.  She is currently on patient is currently on the fourth floor./Room air.   OT consulted due to recent decline in ADL/transfer independence.  No previous OT services for current condition.  -AV       Row Name 12/10/24 1152          Living Environment    People in Home child(felix), adult  -AV       Row Name 12/10/24 1152          Cognition    Orientation Status (Cognition) --  alert. decreased cooperation in answering questions. able to follow commands when desired.  -AV       Row Name 12/10/24 1152          Safety Issues/Impairments Affecting Functional Mobility     Impairments Affecting Function (Mobility) balance;endurance/activity tolerance;strength  -AV               User Key  (r) = Recorded By, (t) = Taken By, (c) = Cosigned By      Initials Name Provider Type    Dontae Gill OT Occupational Therapist                     Mobility/ADL's       Row Name 12/10/24 1154          Transfers    Comment, (Transfers) declined testing: assist of 1 BSC transfer per nursing  -AV       Row Name 12/10/24 1154          Activities of Daily Living    BADL Assessment/Intervention --  NPO. mod assist bathing, dressing and grooming with setup.  -AV               User Key  (r) = Recorded By, (t) = Taken By, (c) = Cosigned By      Initials Name Provider Type    Dontae Gill OT Occupational Therapist                   Obj/Interventions       Row Name 12/10/24 1154          Sensory Assessment (Somatosensory)    Sensory Assessment (Somatosensory) UE sensation intact  -AV       Row Name 12/10/24 1154          Vision Assessment/Intervention    Visual Impairment/Limitations WFL  -AV       Row Name 12/10/24 1154          Range of Motion Comprehensive    General Range of Motion bilateral upper extremity ROM WFL  -AV     Comment, General Range of Motion AROM  -AV       Row Name 12/10/24 1154          Strength Comprehensive (MMT)    Comment, General Manual Muscle Testing (MMT) Assessment 4(-)/5 bilateral biceps, triceps and   -AV       Row Name 12/10/24 1154          Motor Skills    Motor Skills coordination;functional endurance  -AV     Coordination --  right dominant  -AV     Functional Endurance poor plus  -AV       Row Name 12/10/24 1154          Balance    Comment, Balance impaired  -AV               User Key  (r) = Recorded By, (t) = Taken By, (c) = Cosigned By      Initials Name Provider Type    Dontae Gill OT Occupational Therapist                   Goals/Plan       Row Name 12/10/24 1156          Transfer Goal 1 (OT)    Activity/Assistive Device (Transfer Goal 1, OT)  transfers, all  -AV     Chase Level/Cues Needed (Transfer Goal 1, OT) supervision required;verbal cues required  -AV     Time Frame (Transfer Goal 1, OT) long term goal (LTG);10 days  -AV       Row Name 12/10/24 1156          Bathing Goal 1 (OT)    Activity/Device (Bathing Goal 1, OT) bathing skills, all  -AV     Chase Level/Cues Needed (Bathing Goal 1, OT) supervision required;set-up required;verbal cues required  -AV     Time Frame (Bathing Goal 1, OT) long term goal (LTG);10 days  -AV       Row Name 12/10/24 1156          Dressing Goal 1 (OT)    Activity/Device (Dressing Goal 1, OT) dressing skills, all  -AV     Chase/Cues Needed (Dressing Goal 1, OT) supervision required;set-up required;verbal cues required  -AV     Time Frame (Dressing Goal 1, OT) long term goal (LTG);10 days  -AV       Row Name 12/10/24 1156          Toileting Goal 1 (OT)    Activity/Device (Toileting Goal 1, OT) toileting skills, all  -AV     Chase Level/Cues Needed (Toileting Goal 1, OT) supervision required;set-up required;verbal cues required  -AV     Time Frame (Toileting Goal 1, OT) long term goal (LTG);10 days  -AV       Row Name 12/10/24 1156          Grooming Goal 1 (OT)    Activity/Device (Grooming Goal 1, OT) grooming skills, all  -AV     Chase (Grooming Goal 1, OT) supervision required;set-up required;verbal cues required  -AV     Time Frame (Grooming Goal 1, OT) long term goal (LTG);10 days  -AV       Row Name 12/10/24 1156          Strength Goal 1 (OT)    Strength Goal 1 (OT) Patient will demonstrate 4/5 bilateral biceps, triceps and  to increase ADL/transfer independence.  -AV     Time Frame (Strength Goal 1, OT) long term goal (LTG);10 days  -AV       Row Name 12/10/24 1156          Problem Specific Goal 1 (OT)    Problem Specific Goal 1 (OT) Patient will demonstrate fair endurance/activity tolerance needed to support ADLs.  -AV     Time Frame (Problem Specific Goal 1, OT) long term goal  (LTG);10 days  -AV       Row Name 12/10/24 1159          Therapy Assessment/Plan (OT)    Planned Therapy Interventions (OT) activity tolerance training;BADL retraining;functional balance retraining;occupation/activity based interventions;patient/caregiver education/training;strengthening exercise;transfer/mobility retraining  -AV               User Key  (r) = Recorded By, (t) = Taken By, (c) = Cosigned By      Initials Name Provider Type    AV Dontae Villagomez, JOHANNE Occupational Therapist                   Clinical Impression       Row Name 12/10/24 1151          Pain Assessment    Additional Documentation Pain Scale: FACES Pre/Post-Treatment (Group)  -AV       Row Name 12/10/24 1151          Pain Scale: FACES Pre/Post-Treatment    Pain: FACES Scale, Pretreatment 0-->no hurt  -AV     Posttreatment Pain Rating 0-->no hurt  -AV       Row Name 12/10/24 1157          Plan of Care Review    Plan of Care Reviewed With patient  -AV     Progress no change  first session for evaluation  -AV     Outcome Evaluation Patient presents with limitations of balance, strength and endurance/activity tolerance which impede her ability to perform ADL and transfers as prior.  The skills of a therapist will be required to safely and effectively implement treatment plan to restore maximal level of function.  -AV       Row Name 12/10/24 1154          Therapy Assessment/Plan (OT)    Patient/Family Therapy Goal Statement (OT) none stated  -AV     Rehab Potential (OT) fair  -AV     Criteria for Skilled Therapeutic Interventions Met (OT) yes;meets criteria;skilled treatment is necessary  -AV     Therapy Frequency (OT) 5 times/wk  -AV       Row Name 12/10/24 1157          Therapy Plan Review/Discharge Plan (OT)    Anticipated Discharge Disposition (OT) sub acute care setting  -AV       Row Name 12/10/24 1151          Vital Signs    O2 Delivery Pre Treatment room air  -AV     O2 Delivery Intra Treatment room air  -AV     O2 Delivery Post Treatment  room air  -AV       Row Name 12/10/24 1155          Positioning and Restraints    Pre-Treatment Position in bed  -AV     Post Treatment Position bed  -AV     In Bed call light within reach;encouraged to call for assist;with other staff  respiratory therapist  -AV               User Key  (r) = Recorded By, (t) = Taken By, (c) = Cosigned By      Initials Name Provider Type    Dontae Gill OT Occupational Therapist                   Outcome Measures       Row Name 12/10/24 1157          How much help from another is currently needed...    Putting on and taking off regular lower body clothing? 2  -AV     Bathing (including washing, rinsing, and drying) 2  -AV     Toileting (which includes using toilet bed pan or urinal) 2  -AV     Putting on and taking off regular upper body clothing 2  -AV     Taking care of personal grooming (such as brushing teeth) 2  -AV     Eating meals 4  -AV     AM-PAC 6 Clicks Score (OT) 14  -AV       Row Name 12/10/24 1157          Functional Assessment    Outcome Measure Options AM-PAC 6 Clicks Daily Activity (OT);Optimal Instrument  -AV       Row Name 12/10/24 1157          Optimal Instrument    Optimal Instrument Optimal - 3  -AV     Bending/Stooping 5  -AV     Standing 4  -AV     Reaching 1  -AV     From the list, choose the 3 activities you would most like to be able to do without any difficulty Bending/stooping;Standing;Reaching  -AV     Total Score Optimal - 3 10  -AV               User Key  (r) = Recorded By, (t) = Taken By, (c) = Cosigned By      Initials Name Provider Type    Dontae Gill OT Occupational Therapist                    Occupational Therapy Education       Title: PT OT SLP Therapies (Done)       Topic: Occupational Therapy (Done)       Point: ADL training (Done)       Description:   Instruct learner(s) on proper safety adaptation and remediation techniques during self care or transfers.   Instruct in proper use of assistive devices.                  Learning  Progress Summary            Patient Acceptance, E, VU by AV at 12/10/2024 1158                      Point: Home exercise program (Done)       Description:   Instruct learner(s) on appropriate technique for monitoring, assisting and/or progressing therapeutic exercises/activities.                  Learning Progress Summary            Patient Acceptance, E, VU by AV at 12/10/2024 1158                      Point: Precautions (Done)       Description:   Instruct learner(s) on prescribed precautions during self-care and functional transfers.                  Learning Progress Summary            Patient Acceptance, E, VU by AV at 12/10/2024 1158                      Point: Body mechanics (Done)       Description:   Instruct learner(s) on proper positioning and spine alignment during self-care, functional mobility activities and/or exercises.                  Learning Progress Summary            Patient Acceptance, E, VU by AV at 12/10/2024 1158                                      User Key       Initials Effective Dates Name Provider Type Discipline     06/16/21 -  Dontae Villagomez OT Occupational Therapist OT                  OT Recommendation and Plan  Planned Therapy Interventions (OT): activity tolerance training, BADL retraining, functional balance retraining, occupation/activity based interventions, patient/caregiver education/training, strengthening exercise, transfer/mobility retraining  Therapy Frequency (OT): 5 times/wk  Plan of Care Review  Plan of Care Reviewed With: patient  Progress: no change (first session for evaluation)  Outcome Evaluation: Patient presents with limitations of balance, strength and endurance/activity tolerance which impede her ability to perform ADL and transfers as prior.  The skills of a therapist will be required to safely and effectively implement treatment plan to restore maximal level of function.     Time Calculation:   Evaluation Complexity (OT)  Review Occupational  Profile/Medical/Therapy History Complexity: expanded/moderate complexity  Assessment, Occupational Performance/Identification of Deficit Complexity: 1-3 performance deficits  Clinical Decision Making Complexity (OT): problem focused assessment/low complexity  Overall Complexity of Evaluation (OT): low complexity     Time Calculation- OT       Row Name 12/10/24 1159             Time Calculation- OT    OT Received On 12/10/24  -AV      OT Goal Re-Cert Due Date 12/19/24  -AV         Untimed Charges    OT Eval/Re-eval Minutes 35  -AV         Total Minutes    Untimed Charges Total Minutes 35  -AV       Total Minutes 35  -AV                User Key  (r) = Recorded By, (t) = Taken By, (c) = Cosigned By      Initials Name Provider Type    AV Dontae Villagomez OT Occupational Therapist                  Therapy Charges for Today       Code Description Service Date Service Provider Modifiers Qty    [id number] HC OT EVAL LOW COMPLEXITY 3 12/10/2024 Dontae Villagomez OT GO 1                 Dontae Villagomez OT  12/10/2024

## 2024-12-10 NOTE — PROGRESS NOTES
[facility]   Hospitalist Progress Note  Date: 12/10/2024  Patient Name: Isha Llanes  : 1965  MRN: [id number]  Date of admission: 2024      Subjective   Subjective     Chief Complaint: Shortness of air and increased lethargy started a day before admission.    Summary:   Isha Llanes is a 59 y.o. 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 Mancuso and Dr. Zelaya, Hodgkin lymphoma, Dilaudid pain pump for cancer, lupus on chronic prednisone, anxiety disorder, CAD, neuropathy, diabetes mellitus, hypertension, aortic stenosis s/p TAVR, aspiration, chronic hypoxemia as needed home oxygen use, chronic hoarseness and bedridden with contractures of lower extremities  Patient presented with increasing shortness of air and lethargy to [facility] ED.  History is obtained by talking to ED physician to be in the record and daughter at bedside as patient is lethargic.  Patient does wake up to vocal response but goes back to sleep her voice is very hoarse and difficult to comprehend, this is chronic per daughter.  Patient has been having fever along with cough.  Also has mild diarrhea.  Patient has not been vaccinated for COVID.  Workup in the ED showed temperature of 103 heart rate of 132 with soft blood pressure.  91% saturation on 4 L.  ABG showed pH of 7.36, pCO2 of 61, pO2 of 54, O2 sat 86% on 4 L.  CMP is negative.  Lactate is negative.  CBC is negative.  UA is negative.  EKG shows sinus tachycardia 122.  CTA of the chest does not show any PE does have right hilar lymphadenopathy reactive versus neoplastic, narrowing of larynx with edema and thickening similar to previous PET CT from .  Also has new 7 mm right lower lobe pulm nodule.  COVID PCR is positive.  Hospitalist has been called to admit her for further management.  Patient mental condition improved but she failed swallow study with high risk for  Patient has enough refill, however, requesting change in script and dosages.   aspiration and made n.p.o.  Patient agreeable for PEG tube.  Status post PEG on December 10    Interval Followup:   Blood pressure soft.  Patient denies any dizziness.  Per patient she runs low blood pressure, chronic hoarseness of voice  Patient on 3 L now with 96% saturation.  Has been refusing NIPPV.  Patient is awake alert oriented x 3.  Seen after PEG tube  Patient asking for increasing Dilaudid.  IV morphine does not work for her  Complaining of pain at G-tube site  Breathing much more comfortable and not using accessory muscles anymore , shortness of air improving  Cough better  Denies any chest pain.  Complaining of back pain.  Her pain pump is not working as it has not been charged.    Review of Systems   All systems were reviewed and negative except for: Summary and interval follow-up    Objective   Objective     Vitals:   Temp:  [96.9 °F (36.1 °C)-98.5 °F (36.9 °C)] 98.5 °F (36.9 °C)  Heart Rate:  [60-90] 78  Resp:  [16-22] 18  BP: (101-148)/(72-94) 139/92  Flow (L/min) (Oxygen Therapy):  [3-4] 4  Physical Exam    Constitutional: Awake, alert, no acute distress   Eyes: Pupils equal, sclerae anicteric, no conjunctival injection   HENT: NCAT, mucous membranes moist   Neck: Supple, no thyromegaly, no lymphadenopathy, trachea midline   Respiratory: Decreased to auscultation bilaterally, nonlabored respirations    Cardiovascular: Left upper port chest with a healed scar, RRR, 2/6 murmurs, rubs, or gallops, palpable pedal pulses bilaterally   Gastrointestinal: Positive PEG in upper center abdomen, positive bowel sounds, soft, nontender, nondistended   Musculoskeletal: Chronic +1 bilateral ankle edema, no clubbing or cyanosis to extremities   Psychiatric: Appropriate affect, cooperative   Neurologic: Oriented x 3, contracture of lower extremities, Cranial Nerves grossly intact to confrontation, speech clear although chronically hoarse   Skin: Chronic stasis changes bilateral lower extremities    Result Review     Result Review:  I have personally reviewed the results for the past 24 hours and agree with these findings:  [x]  Laboratory  [x]  Microbiology  [x]  Radiology  [x]  EKG/Telemetry EKG reviewed ST segment changes same as previous EKGs.  []  Cardiology/Vascular   []  Pathology  [x]  Old records  [x]  Other: Medications    Assessment & Plan   Assessment / Plan     Assessment:  Acute metabolic encephalopathy due to COVID-pneumonia.  Resolved  Acute on chronic hypoxemic respiratory failure patient needing NIPPV due to use of accessory and abdominal muscles.  Improving  COVID-19 pneumonia.  COPD exacerbation.  Improved  Chronic hoarseness, dysphagia and aspiration.  Status post repeat PEG placement December 10  History of supraglottic squamous cell cancer of larynx status post XRT.  Chronic narrowing of larynx with edema and thickening  Bedridden and chronic contractures of lower extremities.  Sepsis as patient has fever, tachycardia and hypoxemia.  Resolved  Right hilar lymphadenopathy reactive versus neoplastic.  New 7 mm right lower lobe pulmonary nodule.  Need repeat CT chest in 3 months.  Aortic stenosis status post TAVR.  Diabetes mellitus.  Neuropathy.  Lupus on chronic prednisone.  Chronic pain from cancer on Dilaudid pump.  History of Hodgkin's lymphoma.  Hypertension.  Anxiety disorder.     Plan:   Continue supplemental oxygen keep sats more than 90%.  NIPPV as needed.    Decadron day 4.  Remdesivir.  Respiratory culture.  Legionella and strep pneumonia antigen.  Negative MRSA PCR  [facility].  Bronchodilator and bronchopulmonary hygiene protocol.  Incentive spirometry and flutter valve.  Discussed with speech therapy .  Tracheal aspiration noted with nectar and honey thick liquids on barium swallow study.  Remains Complete n.p.o.  Discussed with GI to evaluate patient for PEG.  Patient had PEG in the past.  Discussed with heme-onc as patient wants to know her cancer status.  Continue  sliding-scale insulin.  Lactic acid, procalcitonin and BNP negative  PT OT.  Resumed appropriate home medications.  Dietitian consulted to start tube feeding after 6 hours of PEG placement.  Will switch IV medications to via PEG  DC Home pain pump, continue as needed Xanax as needed Ativan.  IV Dilaudid as needed for pain control.  Restoril at night  Nicotine patch  Continue telemetry      Discussed plan with RN.  Discharge home when stable after PEG placement likely in a.m.    VTE Prophylaxis:  Pharmacologic & mechanical VTE prophylaxis orders are present.        CODE STATUS:   Code Status (Patient has no pulse and is not breathing): CPR (Attempt to Resuscitate)  Medical Interventions (Patient has pulse or is breathing): Full Support      Part of this note may be an electronic transcription/translation of spoken language to printed text using the Dragon Dictation System.         Electronically signed by Isaias Kay MD, 12/10/24, 4:12 PM EST.

## 2024-12-10 NOTE — PROGRESS NOTES
RT EQUIPMENT DEVICE RELATED - SKIN ASSESSMENT    RT Medical Equipment/Device:     NIV Mask:  Full-face    size: small    Skin Assessment:      Cheek:  Intact  Chin:  Intact  Forehead:  Intact  Neck:  Intact  Nose:  Intact  Lips:  Intact  Mouth:  Intact    Device Skin Pressure Protection:  Pressure points protected    Nurse Notification:  Zayda Juan, RRT

## 2024-12-10 NOTE — PROGRESS NOTES
[facility]     Progress Note    Patient Name: Isha Llanes  : 1965  MRN: [id number]  Primary Care Physician:  Virgil Salas MD  Date of admission: 2024    Subjective   Subjective     Chief Complaint: Patient with COVID-pneumonia and recurrent aspiration status post radiation therapy to the laryngeal cancer she is going to get a PEG tube for feeding purposes she has been n.p.o. no other new problems    HPI: Patient with history of Hodgkin's disease in remission as well as laryngeal cancer in remission recently done PET scan did not show evidence of disease she will continue as an outpatient follow-up with my office as well as with Dr. Zelaya and Dr. Nunez    Review of Systems   All systems were reviewed and negative except for: Has been reviewed    Objective   Objective     Vitals:   Temp:  [97.3 °F (36.3 °C)-98.4 °F (36.9 °C)] 97.9 °F (36.6 °C)  Heart Rate:  [60-81] 81  Resp:  [18-20] 20  BP: (101-134)/(64-88) 134/85  Flow (L/min) (Oxygen Therapy):  [3] 3    Physical Exam    Constitutional: Alert and oriented not in acute distress   Eyes: Did not examine   HENT: Normal   Neck: Normal   Respiratory: Did not examine   Cardiovascular: S1-S2 normal no S3 or S4   Gastrointestinal: No palpable organomegaly   Musculoskeletal: No deformities   Neurologic: No localizing signs  Skin: No rash       Result Review    Result Review:  I have personally reviewed the results from the time of this admission to 12/10/2024 08:01 EST and agree with these findings:  [x]  Laboratory  []  Microbiology  []  Radiology  []  EKG/Telemetry   []  Cardiology/Vascular   []  Pathology  []  Old records  []  Other:  Most notable findings include: Has been reviewed and discussed    Assessment & Plan   Assessment / Plan     Brief Patient Summary:  Isha Llanes is a 59 y.o. female who patient with Hodgkin's disease laryngeal cancer in remission currently admitted with COVID-pneumonia and aspiration  pneumonia    Active Hospital Problems:  Active Hospital Problems    Diagnosis     **COVID-19 virus infection     Hypoxemia        Plan:   Continue supportive care    VTE Prophylaxis:  Pharmacologic & mechanical VTE prophylaxis orders are present.        CODE STATUS:   Code Status (Patient has no pulse and is not breathing): CPR (Attempt to Resuscitate)  Medical Interventions (Patient has pulse or is breathing): Full Support    Disposition:  I expect patient to be discharged after the stabilization.    Electronically signed by Teodoro Mancuso MD, 12/10/24, 8:01 AM EST.      Part of this note may be an electronic transcription/translation of spoken language to printed text using the Dragon Dictation System.

## 2024-12-10 NOTE — SIGNIFICANT NOTE
12/10/24 1043   OTHER   Discipline speech language pathologist   Rehab Time/Intention   Session Not Performed other (see comments)   Recommendations   SLP - Next Appointment 12/11/24     Patient is anticipated to have have feeding tube placement soon. SLP to follow up after procedure for swallowing exercises if patient is willing.

## 2024-12-10 NOTE — PLAN OF CARE
Goal Outcome Evaluation:   Patient currently on 3Lnc tolerating well. Patient refused V60 unit. No issues or changes at this time.

## 2024-12-10 NOTE — NURSING NOTE
Palliative Care spoke with daughter this am.  Plans are for feeding tube placement today.  Daughter would like for patient to complete a Living Will during hospital stay.  Palliative will follow up tomorrow after surgery.    Magdalene ANGULO RN, [facility]  Palliative Care

## 2024-12-10 NOTE — PLAN OF CARE
Goal Outcome Evaluation:  Plan of Care Reviewed With: patient        Progress: no change (first session for evaluation)  Outcome Evaluation: Patient presents with limitations of balance, strength and endurance/activity tolerance which impede her ability to perform ADL and transfers as prior.  The skills of a therapist will be required to safely and effectively implement treatment plan to restore maximal level of function.    Anticipated Discharge Disposition (OT): sub acute care setting

## 2024-12-10 NOTE — PLAN OF CARE
Goal Outcome Evaluation:              Outcome Evaluation: Patient A&Ox4. Patient with complaints of pain, prn pain meds administered per mar. Patient very irritable with pain medication regimen and being NPO, RN educated patient on importance of remaining NPO due to failing swallow study and feeding tube placement. Patient states she was told that she can finish her drink early on in shift, prn PO pain med administered per mar since it was too early for IV pain meds. Patient refusing to put O2 in nose and states she does not feel short of breath. Patient with complaints of insomnia, prn ativan administered per mar. Patient has rested for most of shift after med was administered. VSS. Patient has remained NPO since finishing her drink at 2100.

## 2024-12-11 LAB
ALBUMIN SERPL-MCNC: 3.4 G/DL (ref 3.5–5.2)
ALBUMIN/GLOB SERPL: 1.2 G/DL
ALP SERPL-CCNC: 45 U/L (ref 39–117)
ALT SERPL W P-5'-P-CCNC: 11 U/L (ref 1–33)
ANION GAP SERPL CALCULATED.3IONS-SCNC: 11.7 MMOL/L (ref 5–15)
AST SERPL-CCNC: 21 U/L (ref 1–32)
BILIRUB SERPL-MCNC: 0.5 MG/DL (ref 0–1.2)
BUN SERPL-MCNC: 10 MG/DL (ref 6–20)
BUN/CREAT SERPL: 20 (ref 7–25)
CALCIUM SPEC-SCNC: 8 MG/DL (ref 8.6–10.5)
CHLORIDE SERPL-SCNC: 101 MMOL/L (ref 98–107)
CO2 SERPL-SCNC: 25.3 MMOL/L (ref 22–29)
CREAT SERPL-MCNC: 0.5 MG/DL (ref 0.57–1)
EGFRCR SERPLBLD CKD-EPI 2021: 108.2 ML/MIN/1.73
GEN 5 1HR TROPONIN T REFLEX: 20 NG/L
GLOBULIN UR ELPH-MCNC: 2.8 GM/DL
GLUCOSE BLDC GLUCOMTR-MCNC: 118 MG/DL (ref 70–99)
GLUCOSE BLDC GLUCOMTR-MCNC: 119 MG/DL (ref 70–99)
GLUCOSE BLDC GLUCOMTR-MCNC: 152 MG/DL (ref 70–99)
GLUCOSE BLDC GLUCOMTR-MCNC: 90 MG/DL (ref 70–99)
GLUCOSE SERPL-MCNC: 74 MG/DL (ref 65–99)
MAGNESIUM SERPL-MCNC: 1.6 MG/DL (ref 1.6–2.6)
PHOSPHATE SERPL-MCNC: 3.4 MG/DL (ref 2.5–4.5)
POTASSIUM SERPL-SCNC: 3.8 MMOL/L (ref 3.5–5.2)
PROT SERPL-MCNC: 6.2 G/DL (ref 6–8.5)
SODIUM SERPL-SCNC: 138 MMOL/L (ref 136–145)
TROPONIN T % DELTA: 11 %
TROPONIN T NUMERIC DELTA: 2 NG/L

## 2024-12-11 PROCEDURE — 84100 ASSAY OF PHOSPHORUS: CPT | Performed by: STUDENT IN AN ORGANIZED HEALTH CARE EDUCATION/TRAINING PROGRAM

## 2024-12-11 PROCEDURE — 25010000002 REMDESIVIR 100 MG RECONSTITUTED SOLUTION: Performed by: INTERNAL MEDICINE

## 2024-12-11 PROCEDURE — 25010000002 HYDROMORPHONE PER 4 MG: Performed by: INTERNAL MEDICINE

## 2024-12-11 PROCEDURE — 83735 ASSAY OF MAGNESIUM: CPT | Performed by: STUDENT IN AN ORGANIZED HEALTH CARE EDUCATION/TRAINING PROGRAM

## 2024-12-11 PROCEDURE — 99232 SBSQ HOSP IP/OBS MODERATE 35: CPT | Performed by: STUDENT IN AN ORGANIZED HEALTH CARE EDUCATION/TRAINING PROGRAM

## 2024-12-11 PROCEDURE — 94799 UNLISTED PULMONARY SVC/PX: CPT

## 2024-12-11 PROCEDURE — 25010000002 LORAZEPAM PER 2 MG: Performed by: INTERNAL MEDICINE

## 2024-12-11 PROCEDURE — 84484 ASSAY OF TROPONIN QUANT: CPT | Performed by: INTERNAL MEDICINE

## 2024-12-11 PROCEDURE — 25810000003 SODIUM CHLORIDE 0.9 % SOLUTION: Performed by: INTERNAL MEDICINE

## 2024-12-11 PROCEDURE — 25010000002 ONDANSETRON PER 1 MG: Performed by: INTERNAL MEDICINE

## 2024-12-11 PROCEDURE — 25010000002 ENOXAPARIN PER 10 MG: Performed by: INTERNAL MEDICINE

## 2024-12-11 PROCEDURE — 80053 COMPREHEN METABOLIC PANEL: CPT | Performed by: INTERNAL MEDICINE

## 2024-12-11 PROCEDURE — 25010000002 DEXAMETHASONE SODIUM PHOSPHATE 10 MG/ML SOLUTION: Performed by: INTERNAL MEDICINE

## 2024-12-11 PROCEDURE — 63710000001 INSULIN REGULAR HUMAN PER 5 UNITS: Performed by: INTERNAL MEDICINE

## 2024-12-11 PROCEDURE — 25010000002 HYDROMORPHONE PER 4 MG: Performed by: STUDENT IN AN ORGANIZED HEALTH CARE EDUCATION/TRAINING PROGRAM

## 2024-12-11 PROCEDURE — 82948 REAGENT STRIP/BLOOD GLUCOSE: CPT | Performed by: INTERNAL MEDICINE

## 2024-12-11 PROCEDURE — 25010000002 LORAZEPAM PER 2 MG: Performed by: PHYSICIAN ASSISTANT

## 2024-12-11 PROCEDURE — 82948 REAGENT STRIP/BLOOD GLUCOSE: CPT

## 2024-12-11 RX ORDER — METRONIDAZOLE 500 MG/1
500 TABLET ORAL EVERY 8 HOURS SCHEDULED
Status: DISCONTINUED | OUTPATIENT
Start: 2024-12-11 | End: 2024-12-12 | Stop reason: HOSPADM

## 2024-12-11 RX ORDER — DICYCLOMINE HYDROCHLORIDE 10 MG/1
10 CAPSULE ORAL 4 TIMES DAILY
Status: DISCONTINUED | OUTPATIENT
Start: 2024-12-11 | End: 2024-12-12 | Stop reason: HOSPADM

## 2024-12-11 RX ORDER — LORAZEPAM 2 MG/ML
0.5 INJECTION INTRAMUSCULAR EVERY 6 HOURS PRN
Status: DISCONTINUED | OUTPATIENT
Start: 2024-12-11 | End: 2024-12-12 | Stop reason: HOSPADM

## 2024-12-11 RX ORDER — METRONIDAZOLE 500 MG/1
500 TABLET ORAL EVERY 8 HOURS SCHEDULED
Status: DISCONTINUED | OUTPATIENT
Start: 2024-12-11 | End: 2024-12-11

## 2024-12-11 RX ORDER — LOPERAMIDE HYDROCHLORIDE 2 MG/1
4 CAPSULE ORAL 4 TIMES DAILY PRN
Status: DISCONTINUED | OUTPATIENT
Start: 2024-12-11 | End: 2024-12-12 | Stop reason: HOSPADM

## 2024-12-11 RX ORDER — HYDROMORPHONE HYDROCHLORIDE 1 MG/ML
2 INJECTION, SOLUTION INTRAMUSCULAR; INTRAVENOUS; SUBCUTANEOUS
Status: DISCONTINUED | OUTPATIENT
Start: 2024-12-11 | End: 2024-12-12 | Stop reason: HOSPADM

## 2024-12-11 RX ADMIN — LORAZEPAM 0.5 MG: 2 INJECTION INTRAMUSCULAR; INTRAVENOUS at 23:50

## 2024-12-11 RX ADMIN — INSULIN HUMAN 2 UNITS: 100 INJECTION, SOLUTION PARENTERAL at 17:08

## 2024-12-11 RX ADMIN — HYDROMORPHONE HYDROCHLORIDE 2 MG: 1 INJECTION, SOLUTION INTRAMUSCULAR; INTRAVENOUS; SUBCUTANEOUS at 17:35

## 2024-12-11 RX ADMIN — HYDROMORPHONE HYDROCHLORIDE 2 MG: 1 INJECTION, SOLUTION INTRAMUSCULAR; INTRAVENOUS; SUBCUTANEOUS at 00:48

## 2024-12-11 RX ADMIN — OXYCODONE 10 MG: 5 TABLET ORAL at 08:10

## 2024-12-11 RX ADMIN — ENOXAPARIN SODIUM 40 MG: 100 INJECTION SUBCUTANEOUS at 08:19

## 2024-12-11 RX ADMIN — HYDROMORPHONE HYDROCHLORIDE 2 MG: 1 INJECTION, SOLUTION INTRAMUSCULAR; INTRAVENOUS; SUBCUTANEOUS at 04:40

## 2024-12-11 RX ADMIN — LOPERAMIDE HYDROCHLORIDE 4 MG: 2 CAPSULE ORAL at 11:55

## 2024-12-11 RX ADMIN — HYDROMORPHONE HYDROCHLORIDE 2 MG: 1 INJECTION, SOLUTION INTRAMUSCULAR; INTRAVENOUS; SUBCUTANEOUS at 20:41

## 2024-12-11 RX ADMIN — DEXAMETHASONE SODIUM PHOSPHATE 6 MG: 10 INJECTION INTRAMUSCULAR; INTRAVENOUS at 08:08

## 2024-12-11 RX ADMIN — NICOTINE 1 PATCH: 21 PATCH, EXTENDED RELEASE TRANSDERMAL at 08:09

## 2024-12-11 RX ADMIN — LORAZEPAM 0.25 MG: 2 INJECTION INTRAMUSCULAR; INTRAVENOUS at 17:36

## 2024-12-11 RX ADMIN — HYDROMORPHONE HYDROCHLORIDE 2 MG: 1 INJECTION, SOLUTION INTRAMUSCULAR; INTRAVENOUS; SUBCUTANEOUS at 09:49

## 2024-12-11 RX ADMIN — LOPERAMIDE HYDROCHLORIDE 4 MG: 2 CAPSULE ORAL at 20:41

## 2024-12-11 RX ADMIN — FAMOTIDINE 20 MG: 10 INJECTION INTRAVENOUS at 08:09

## 2024-12-11 RX ADMIN — DICYCLOMINE HYDROCHLORIDE 10 MG: 10 CAPSULE ORAL at 11:55

## 2024-12-11 RX ADMIN — LORAZEPAM 0.25 MG: 2 INJECTION INTRAMUSCULAR; INTRAVENOUS at 08:05

## 2024-12-11 RX ADMIN — OXYCODONE 10 MG: 5 TABLET ORAL at 15:44

## 2024-12-11 RX ADMIN — Medication 10 ML: at 08:10

## 2024-12-11 RX ADMIN — ONDANSETRON 4 MG: 2 INJECTION INTRAMUSCULAR; INTRAVENOUS at 08:09

## 2024-12-11 RX ADMIN — Medication 10 ML: at 20:41

## 2024-12-11 RX ADMIN — OXYCODONE 10 MG: 5 TABLET ORAL at 22:53

## 2024-12-11 RX ADMIN — ALPRAZOLAM 0.5 MG: 0.25 TABLET ORAL at 13:04

## 2024-12-11 RX ADMIN — ALPRAZOLAM 0.5 MG: 0.25 TABLET ORAL at 00:48

## 2024-12-11 RX ADMIN — HYDROMORPHONE HYDROCHLORIDE 2 MG: 1 INJECTION, SOLUTION INTRAMUSCULAR; INTRAVENOUS; SUBCUTANEOUS at 13:35

## 2024-12-11 NOTE — CONSULTS
Nutrition Services    Patient Name: Isha Llanes  YOB: 1965  MRN: [id number]  Admission date: 12/7/2024      CLINICAL NUTRITION ASSESSMENT      Reason for Assessment  Tube Feeding Assessment     H&P:  Past Medical History:   Diagnosis Date    Anesthesia     REPORTS HAS GOT REAL EMOTIONAL AND HAS BECOME ANGRY BEFORE    Anxiety     Aortic stenosis, severe     HAS BIO VALVE REPLACED    Arthritis     Asthma     Back pain     Blood clotting disorder     Cancer     Cancer associated pain     HODGKINS LYMPHOMA    CHF (congestive heart failure)     Chronic low back pain with sciatica     Chronic pain disorder     COPD (chronic obstructive pulmonary disease)     Coronary artery disease     Diabetes mellitus     TYPE 2    Diabetes mellitus     Dyspnea on effort     GERD (gastroesophageal reflux disease)     H/O lumpectomy     H/O: hysterectomy     Hiatal hernia     History of degenerative disc disease     History of kidney stones     History of pulmonary embolus (PE)     History of transfusion     AS A CHILD NO TRANSFUSION REACTION    History of transfusion     Hodgkin's lymphoma     5/19/23  5 YEARS SINCE LAST CHEMO TX    Hypertension     Immobility     Liver disease     DENIES ANY CURRENT ISSUES    Lupus     Mitral valve prolapse     Nausea vomiting and diarrhea 3/7/2024    Panic disorder     Pelvic pain     Peripheral neuropathy     Personal history of DVT (deep vein thrombosis)     Presence of intrathecal pump     PTSD (post-traumatic stress disorder)     Sacroiliac joint disease     SI (sacroiliac) joint inflammation     Sleep apnea     Venous insufficiency         Current Problems:   Active Hospital Problems    Diagnosis     **COVID-19 virus infection     Hypoxemia     AMS (altered mental status)         Nutrition/Diet History         Narrative   12/9 SLP recs, SLP recommends the patient be NPO due to aspiration of all consistencies.   Pt declined interview d/t pain. RN aware. Discussed EN, will  "initiate as able.  12/10 EGD, PEG placement  EN will provide 100 % of kcal, protein needs.  NFPE deferred. At risk for malnutrition, appears to have muscle wasting LE, poor intake x 4 days. RD to monitor s/s.  +BM, 12/10     Anthropometrics        Current Height, Weight Height: 167.6 cm (66\")  Weight: 61 kg (134 lb 7.7 oz)   Current BMI Body mass index is 21.71 kg/m².   BMI Classification Normal range   % %, IBW 59 kg   Adjusted Body Weight (ABW)    Weight Hx  Wt Readings from Last 12 Encounters:   12/11/24 0642 58.1 kg (128 lb 1.4 oz)   12/10/24 0541 61 kg (134 lb 7.7 oz)   12/09/24 0531 59.6 kg (131 lb 6.3 oz)   12/07/24 0745 64 kg (141 lb 1.5 oz)   11/07/24 0406 57.7 kg (127 lb 3.3 oz)   11/06/24 2148 59.9 kg (132 lb 0.9 oz)   09/13/24 0943 57.6 kg (127 lb)   08/21/24 1605 65.7 kg (144 lb 13.5 oz)   08/21/24 1421 60.3 kg (133 lb)   08/07/24 0928 60.3 kg (133 lb)   07/20/24 0752 60.5 kg (133 lb 6.1 oz)   07/02/24 1340 60.5 kg (133 lb 6.1 oz)   06/27/24 0950 46.7 kg (103 lb)   06/13/24 0417 58.6 kg (129 lb 3 oz)   06/12/24 2236 59.3 kg (130 lb 11.7 oz)   06/06/24 1049 48 kg (105 lb 14.4 oz)   05/29/24 2259 62.2 kg (137 lb 2 oz)          Wt Change Observation Per EMR, wt appears stable. ~130# x 6 months. Monitor      Estimated/Assessed Needs  Estimated Needs based on: Current Body Weight 61 kg       Energy Requirements 30-35 kcal/kg    EST Needs (kcal/day) 2008-0625 kcal       Protein Requirements 1.2-1.5 g.kg   EST Daily Needs (g/day) 73-92 g       Fluid Requirements 30-35 mL/kg    Estimated Needs (mL/day) 1929-8493 mL     Labs/Medications         Pertinent Labs Reviewed.   Results from last 7 days   Lab Units 12/11/24  0551 12/10/24  1736 12/10/24  0622   SODIUM mmol/L 138 143 141   POTASSIUM mmol/L 3.8 3.5 4.1   CHLORIDE mmol/L 101 104 102   CO2 mmol/L 25.3 26.7 31.5*   BUN mg/dL 10 12 14   CREATININE mg/dL 0.50* 0.59 0.50*   CALCIUM mg/dL 8.0* 7.5* 8.4*   BILIRUBIN mg/dL 0.5 0.3 0.3   ALK PHOS U/L 45 43 " 44   ALT (SGPT) U/L 11 10 12   AST (SGOT) U/L 21 18 20   GLUCOSE mg/dL 74 90 89     Results from last 7 days   Lab Units 12/10/24  1736 12/07/24  0932 12/07/24  0757   MAGNESIUM mg/dL  --   --  1.7   HEMOGLOBIN g/dL 11.9*   < >  --    HEMATOCRIT % 39.0   < >  --     < > = values in this interval not displayed.     COVID19   Date Value Ref Range Status   12/07/2024 Detected (C) Not Detected - Ref. Range Final     Lab Results   Component Value Date    HGBA1C 5.90 (H) 11/29/2023         Pertinent Medications Reviewed.     Malnutrition Severity Assessment              Nutrition Diagnosis         Nutrition Dx Problem 1 Swallowing difficulty related to decreased ability to consume sufficient energy as evidenced by SLP evaluation and need for enteral support via PEG.     Nutrition Intervention           Current Nutrition Orders & Evaluation of Intake       Current PO Diet NPO Diet NPO Type: Strict NPO   Supplement No active supplement orders           Nutrition Intervention/Prescription        Suggest start Diabetasource @ 20 mL/hr, advance q 8 hours to goal 65 mL/hr x 22 hrs.  Water flush 90 mL q 4 hr (540 mL).  Monitor and replace lytes, prn.  SLP to follow.    EN at goal provides:  1430 mL volume, 1715 kcal, 86 g protein, total mL/day water (1175 mL FW+ 540 mL flush)        Medical Nutrition Therapy/Nutrition Education          Learner     Readiness N/A  N/A     Method     Response N/A  N/A     Monitor/Evaluation        Monitor Pertinent labs, EN delivery/tolerance, Weight, GI status, Swallow function     Nutrition Discharge Plan         Recommend to continue oral nutrition supplements on discharge.      Electronically signed by:  Sandra Ortega RD  12/11/24 06:55 EST

## 2024-12-11 NOTE — PROGRESS NOTES
[facility]   Hospitalist Progress Note  Date: 2024  Patient Name: Isha Llanes  : 1965  MRN: [id number]  Date of admission: 2024      Subjective   Subjective     Chief Complaint: Shortness of air and increased lethargy started a day before admission.    Summary:   Isha Llanes is a 59 y.o. 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 Macnuso and Dr. Zelaya, Hodgkin lymphoma, Dilaudid pain pump for cancer, lupus on chronic prednisone, anxiety disorder, CAD, neuropathy, diabetes mellitus, hypertension, aortic stenosis s/p TAVR, aspiration, chronic hypoxemia as needed home oxygen use, chronic hoarseness and bedridden with contractures of lower extremities  Patient presented with increasing shortness of air and lethargy to [facility] ED.  History is obtained by talking to ED physician to be in the record and daughter at bedside as patient is lethargic.  Patient does wake up to vocal response but goes back to sleep her voice is very hoarse and difficult to comprehend, this is chronic per daughter.  Patient has been having fever along with cough.  Also has mild diarrhea.  Patient has not been vaccinated for COVID.  Workup in the ED showed temperature of 103 heart rate of 132 with soft blood pressure.  91% saturation on 4 L.  ABG showed pH of 7.36, pCO2 of 61, pO2 of 54, O2 sat 86% on 4 L.  CMP is negative.  Lactate is negative.  CBC is negative.  UA is negative.  EKG shows sinus tachycardia 122.  CTA of the chest does not show any PE does have right hilar lymphadenopathy reactive versus neoplastic, narrowing of larynx with edema and thickening similar to previous PET CT from .  Also has new 7 mm right lower lobe pulm nodule.  COVID PCR is positive.  Hospitalist has been called to admit her for further management.  Patient mental condition improved but she failed swallow study with high risk for  aspiration and made n.p.o.  Patient agreeable for PEG tube.  Status post PEG on December 10.    Interval Followup:   Patient had pain overnight.  Complaining of some pain on right lower quadrant, crampy in nature and intermittent.  CT abdomen pelvis without contrast showed colitis in ascending colon.  Started on dicyclomine 10 mg 4 times daily for colitis pain.  Continue as needed oxycodone 10 mg every 6 hours as needed.  Increasing frequency of Dilaudid to every 3 hours as needed for breakthrough pain.  Starting tube feeds today.  Nutritionist on board.  Family at bedside, updated.  No other active complaints.     Review of Systems   All systems were reviewed and negative except for: Summary and interval follow-up    Objective   Objective     Vitals:   Temp:  [96.9 °F (36.1 °C)-98.5 °F (36.9 °C)] 97.2 °F (36.2 °C)  Heart Rate:  [71-96] 96  Resp:  [16-24] 20  BP: (110-148)/(72-94) 139/80  Flow (L/min) (Oxygen Therapy):  [3-4] 4  Physical Exam    Constitutional: Awake, alert, no acute distress   Respiratory: Decreased to auscultation bilaterally, nonlabored respirations    Cardiovascular: Left upper port chest with a healed scar, RRR, 2/6 murmurs, rubs, or gallops, palpable pedal pulses bilaterally   Gastrointestinal: Positive PEG in upper center abdomen, positive bowel sounds, soft, nontender, nondistended   Musculoskeletal: Chronic +1 bilateral ankle edema, no clubbing or cyanosis to extremities   Psychiatric: Appropriate affect, cooperative   Neurologic: Oriented x 3, contracture of lower extremities, Cranial Nerves grossly intact to confrontation, speech clear although chronically hoarse   Skin: Chronic stasis changes bilateral lower extremities    Result Review    Result Review:  I have personally reviewed the results for the past 24 hours and agree with these findings:  [x]  Laboratory  [x]  Microbiology  [x]  Radiology  [x]  EKG/Telemetry EKG reviewed ST segment changes same as previous EKGs.  []   Cardiology/Vascular   []  Pathology  [x]  Old records  [x]  Other: Medications    Assessment & Plan   Assessment / Plan     Assessment:  Acute metabolic encephalopathy due to COVID-pneumonia.  Resolved  Acute on chronic hypoxemic respiratory failure patient needing NIPPV due to use of accessory and abdominal muscles.  Improving  COVID-19 pneumonia.  COPD exacerbation.  Improved  Chronic hoarseness, dysphagia and aspiration.  Status post repeat PEG placement December 10  History of supraglottic squamous cell cancer of larynx status post XRT.  Chronic narrowing of larynx with edema and thickening  Bedridden and chronic contractures of lower extremities.  Sepsis as patient has fever, tachycardia and hypoxemia.  Resolved  Right hilar lymphadenopathy reactive versus neoplastic.  New 7 mm right lower lobe pulmonary nodule.  Need repeat CT chest in 3 months.  Aortic stenosis status post TAVR.  Diabetes mellitus.  Neuropathy.  Lupus on chronic prednisone.  Chronic pain from cancer on Dilaudid pump.  History of Hodgkin's lymphoma.  Hypertension.  Anxiety disorder.     Plan:   Patient currently being managed in medicine service.  Continue supplemental oxygen keep sats more than 90%.  NIPPV as needed.  Currently on Decadron 6 mg daily for COVID-19.  Day 4 today.    Also on remdesivir.  Respiratory culture.  Legionella and strep pneumonia antigen.  Negative MRSA PCR  DuoNeb, Pulmicort brovana neb.  Bronchodilator and bronchopulmonary hygiene protocol.  Incentive spirometry and flutter valve.  Underwent PEG tube placement on 12/10 for dysphagia with aspiration risk.  Tolerated the procedure well.  CT scan abdomen was obtained showing colitis.  Started on p.o. metronidazole.  Also started on dicyclomine for persistent crampy abdominal pain likely from colitis.  Continue as needed oxycodone for pain, also on as needed Dilaudid for breakthrough pain.  Continue sliding-scale insulin.  Lactic acid, procalcitonin and BNP negative  PT  OT.  Resumed appropriate home medications.  Home pain pump not working so managing pain with IV pain medications.  continue as needed Xanax as needed Ativan for restlessness and anxiety.  Restoril at night  Nicotine patch  Continue telemetry  Clinical course to determine disposition.      Discussed plan with RN.      VTE Prophylaxis:  Pharmacologic & mechanical VTE prophylaxis orders are present.        CODE STATUS:   Code Status (Patient has no pulse and is not breathing): CPR (Attempt to Resuscitate)  Medical Interventions (Patient has pulse or is breathing): Full Support      Part of this note may be an electronic transcription/translation of spoken language to printed text using the Dragon Dictation System.         Electronically signed by Ruperto Abdi MD, 12/11/24, 2:12 PM EST.

## 2024-12-11 NOTE — SIGNIFICANT NOTE
Wound Eval / Progress Noted    JEMMA Go     Patient Name: Isha Llanes  : 1965  MRN: [id number]  Today's Date: 2024                 Admit Date: 2024    Visit Dx:    ICD-10-CM ICD-9-CM   1. Shortness of breath  R06.02 786.05   2. COVID-19  U07.1 079.89   3. Oropharyngeal dysphagia  R13.12 787.22   4. Decreased activities of daily living (ADL)  Z78.9 V49.89   5. Difficulty walking  R26.2 719.7         COVID-19 virus infection    AMS (altered mental status)    Hypoxemia        Past Medical History:   Diagnosis Date    Anesthesia     REPORTS HAS GOT REAL EMOTIONAL AND HAS BECOME ANGRY BEFORE    Anxiety     Aortic stenosis, severe     HAS BIO VALVE REPLACED    Arthritis     Asthma     Back pain     Blood clotting disorder     Cancer     Cancer associated pain     HODGKINS LYMPHOMA    CHF (congestive heart failure)     Chronic low back pain with sciatica     Chronic pain disorder     COPD (chronic obstructive pulmonary disease)     Coronary artery disease     Diabetes mellitus     TYPE 2    Diabetes mellitus     Dyspnea on effort     GERD (gastroesophageal reflux disease)     H/O lumpectomy     H/O: hysterectomy     Hiatal hernia     History of degenerative disc disease     History of kidney stones     History of pulmonary embolus (PE)     History of transfusion     AS A CHILD NO TRANSFUSION REACTION    History of transfusion     Hodgkin's lymphoma     23  5 YEARS SINCE LAST CHEMO TX    Hypertension     Immobility     Liver disease     DENIES ANY CURRENT ISSUES    Lupus     Mitral valve prolapse     Nausea vomiting and diarrhea 3/7/2024    Panic disorder     Pelvic pain     Peripheral neuropathy     Personal history of DVT (deep vein thrombosis)     Presence of intrathecal pump     PTSD (post-traumatic stress disorder)     Sacroiliac joint disease     SI (sacroiliac) joint inflammation     Sleep apnea     Venous insufficiency         Past Surgical History:   Procedure Laterality Date     ABDOMINAL SURGERY      BACK SURGERY      SPINAL FUSION     BACK SURGERY      BLADDER REPAIR      CARDIAC CATHETERIZATION N/A 03/06/2019    Procedure: Valvuloplasty;  Surgeon: Wayne Johnson MD;  Location: [facility] INVASIVE LOCATION;  Service: Cardiology    CARDIAC CATHETERIZATION      CARDIAC CATHETERIZATION Left 5/31/2023    Procedure: Cardiac Catheterization/Vascular Study;  Surgeon: Poncho Reid MD;  Location: [facility] CATH INVASIVE LOCATION;  Service: Cardiovascular;  Laterality: Left;    CARDIAC SURGERY      CARDIAC VALVE REPLACEMENT  2021    CHOLECYSTECTOMY      COLONOSCOPY N/A 12/29/2021    Procedure: COLONOSCOPY;  Surgeon: Karine Orlando MD;  Location: [facility] ENDOSCOPY;  Service: Gastroenterology;  Laterality: N/A;  POOR PREP    COLONOSCOPY N/A 04/13/2022    Procedure: COLONOSCOPY WITH POLYPECTOMY;  Surgeon: Karine Orlando MD;  Location: [facility] ENDOSCOPY;  Service: Gastroenterology;  Laterality: N/A;  COLON POLYP, HEMORRHOIDS, POOR PREP    COLONOSCOPY      DIRECT LARYNGOSCOPY, ESOPHAGOSCOPY, BRONCHOSCOPY N/A 5/22/2023    Procedure: DIRECT LARYNGOSCOPY WITH BIOPSIES , ESOPHAGOSCOPY, BRONCHOSCOPY;  Surgeon: Ronnie Mcgarry MD;  Location: [facility] OR OSC;  Service: ENT;  Laterality: N/A;    ENDOSCOPY N/A 12/29/2021    Procedure: ESOPHAGOGASTRODUODENOSCOPY;  Surgeon: Karine Orlando MD;  Location: [facility] ENDOSCOPY;  Service: Gastroenterology;  Laterality: N/A;  ESOPHAGITIS, GASTRITIS, HIATAL HERNIA    ENDOSCOPY      ENDOSCOPY N/A 4/16/2024    Procedure: ESOPHAGOGASTRODUODENOSCOPY WITH BIOPSIES;  Surgeon: Karine Orlando MD;  Location: [facility] ENDOSCOPY;  Service: Gastroenterology;  Laterality: N/A;  ESOPHAGITIS, HIATAL HERNIA    ENDOSCOPY W/ PEG TUBE PLACEMENT N/A 12/10/2024    Procedure: ESOPHAGOGASTRODUODENOSCOPY WITH PERCUTANEOUS ENDOSCOPIC GASTROSTOMY TUBE INSERTION WITH ANESTHESIA;  Surgeon: Rodger Ortega MD;  Location: [facility] ENDOSCOPY;   Service: Gastroenterology;  Laterality: N/A;  PEG TUBE INSERTION    HYSTERECTOMY      LYMPHADENECTOMY      PAIN PUMP INSERTION/REVISION N/A 10/18/2019    Procedure: PAIN PUMP INSERTION [facility] 10-18-19 [place];  Surgeon: Horace Rios MD;  Location: [facility];  Service: Pain Management    PAIN PUMP INSERTION/REVISION N/A 11/23/2020    Procedure: pain pump removal;  Surgeon: Horace Rios MD;  Location: [facility];  Service: Pain Management;  Laterality: N/A;    PEG TUBE INSERTION N/A 7/26/2023    Procedure: PERCUTANEOUS ENDOSCOPIC GASTROSTOMY TUBE INSERTION;  Surgeon: Kody Mercer MD;  Location: [facility] ENDOSCOPY;  Service: General;  Laterality: N/A;  SUCCESSFUL PEG TUBE PLACE PLACEMENT    PORTACATH PLACEMENT      IN LEFT CHEST REPORTS THAT IT IS NOT FUNCTIONING    SKIN BIOPSY      TONSILLECTOMY      TUBAL ABDOMINAL LIGATION      TUMOR REMOVAL           Physical Assessment:  Wound 08/22/24 0629 Left lower leg Other (comment) (Active)   Wound Image   12/11/24 1515   Dressing Appearance open to air 12/11/24 1515   Closure None 12/11/24 1515   Base red;moist 12/11/24 1515   Red (%), Wound Tissue Color 100 12/11/24 1515   Periwound intact;dry;other (see comments) 12/11/24 1515   Periwound Temperature warm 12/11/24 1515   Periwound Skin Turgor soft 12/11/24 1515   Edges open;rolled/closed 12/11/24 1515   Drainage Characteristics/Odor serosanguineous 12/11/24 1515   Drainage Amount scant 12/11/24 1515   Care, Wound cleansed with;sterile normal saline 12/11/24 1515   Dressing Care open to air 12/11/24 1515        Wound Check / Follow-up:  Patient seen today for a wound consult. Patient is awake and alert at assessment, agreeable to wound check at this time.    She has chronic discoloration to the bilateral lower legs with a small open area to the left lateral lower leg with red moist wound base, scabbing was covering the base of the wound but patient reports hitting leg and the scab being removed.  She also has scattered areas of scabbing present to the bilateral lower legs. Will recommend to provide quality skin care and hygiene with the application of the purple top moisturizer to the legs and feet and leave open to air. Provide daily dressing changes with non-adherent petroleum gauze to the wound. Encourage patient to elevate lower legs.    Patient would not allow for full skin assessment.     Impression: scattered scabs, small abrasion     Short term goals:  regain skin integrity, skin protection, topical treatment, pressure reduction, daily dressing changes, quality skin care and hygiene    Isha Hughes RN    12/11/2024    16:06 EST

## 2024-12-11 NOTE — PROGRESS NOTES
"Enter Query Response Below      Query Response: Acute hypoxic respiratory failure not due to sepsis.    Electronically signed by Isaias Kay MD, 24, 5:58 PM EST.               If applicable, please update the problem list.     Patient: Isha Llanes        : 1965  Account: [id number]           Admit Date: 2024        How to Respond to this query:       a. Click New Note     b. Answer query within the yellow box.                c. Update the Problem List, if applicable.      If you have any questions about this query contact me at: [email or web address]     Dr. Kay:    59-year-old patient presented with shortness of air and lethargy.  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  Admit diagnoses included \"Acute on chronic hypoxemic respiratory failure patient needing NIPPV due to use of accessory and abdominal muscles\", Sepsis, COVID-19 pneumonia, COPD exacerbation.  Clinical findings- ED-Initial vital signs-respirations 30, 02 saturation 95% on room air. Saturation drop 86% after arrival, 4L/NC initiated.  ABG's on 4L -ph 7.367, pC02 61, p02 54, transitioned to BiPAP.  Treatment-  BiPAP 1449-4836 PM.  Transitioned to 3-4L/NC -12/10, Brovana, Pulmicort nebs.      Please clarify if patient treated/monitored for the following:     Severe sepsis causing acute organ failure, acute hypoxic respiratory failure   Acute hypoxic respiratory failure not due to Sepsis  Other (please specify) ___________  Unable to determine    By submitting this query, we are merely seeking further clarification of documentation to accurately reflect all conditions that you are monitoring, evaluating, treating or that extend the hospitalization or utilize additional resources of care. Please utilize your independent " clinical judgment when addressing the question(s) above.     This query and your response, once completed, will be entered into the legal medical record.    Sincerely,  Missy Post RN, [facility]  Clinical Documentation Integrity Program

## 2024-12-11 NOTE — PLAN OF CARE
"Goal Outcome Evaluation:     Patient remains A&Ox4. Patient with complaints of pain multiple times throughout shift, prn pain meds administered per md order. This RN discussed pain management medications with patient. Patient requesting dilaudid before due time. This RN offered Toradol for pain since it was too soon for any other pain medications and patient was crying due to pain, patient refused the Toradol and stated, \"Toradol doesn't do anything for pain and I don't want it.\" Patient refusing to wear bipap and O2, O2sats stable at this time. Patient has had multiple incontinent episodes of diarrhea throughout shift, this RN and PCA assisted patient in getting cleaned up and brief changed. Patient has PEG tube in place and is being used for meds only during this shift. Patient has pain pump intact. Patient has left chest port that has been accessed, has good blood return and flushed easily. VSS.                           "

## 2024-12-11 NOTE — PROGRESS NOTES
[facility]     Progress Note    Patient Name: Isha Llanes  : 1965  MRN: [id number]  Primary Care Physician:  Virgil Salas MD  Date of admission: 2024    Subjective   Subjective     Chief Complaint: Has had PEG tube inserted stable not in acute distress    HPI: Admitted with aspiration pneumonia being treated with antibiotic    Review of Systems   All systems were reviewed and negative except for: Has been reviewed    Objective   Objective     Vitals:   Temp:  [96.9 °F (36.1 °C)-98.5 °F (36.9 °C)] 97.2 °F (36.2 °C)  Heart Rate:  [71-96] 96  Resp:  [16-24] 20  BP: (110-148)/(72-94) 139/80  Flow (L/min) (Oxygen Therapy):  [3-4] 4    Physical Exam    Constitutional: Alert and oriented not in acute distress   Eyes: Not examined patient has COVID   HENT: Normal   Neck: Normal   Respiratory: No rales or rhonchi   Cardiovascular: S1-S2 normal no S3 or S4   Gastrointestinal: Normal PEG tube inserted   Musculoskeletal: No deformities   Neurologic: No localizing signs  Skin: No rash       Result Review    Result Review:  I have personally reviewed the results from the time of this admission to 2024 08:43 EST and agree with these findings:  [x]  Laboratory  []  Microbiology  []  Radiology  []  EKG/Telemetry   []  Cardiology/Vascular   []  Pathology  []  Old records  []  Other:  Most notable findings include: Have been reviewed    Assessment & Plan   Assessment / Plan     Brief Patient Summary:  Isha Llanes is a 59 y.o. female who patient with history of COVID aspiration pneumonia    Active Hospital Problems:  Active Hospital Problems    Diagnosis     **COVID-19 virus infection     Hypoxemia     AMS (altered mental status)        Plan:   Current management    VTE Prophylaxis:  Pharmacologic & mechanical VTE prophylaxis orders are present.        CODE STATUS:   Code Status (Patient has no pulse and is not breathing): CPR (Attempt to Resuscitate)  Medical Interventions (Patient has  pulse or is breathing): Full Support    Disposition:  I expect patient to be discharged after the patient has been stabilized.    Electronically signed by Teodoro Mancuso MD, 12/11/24, 8:43 AM EST.      Part of this note may be an electronic transcription/translation of spoken language to printed text using the Dragon Dictation System.

## 2024-12-11 NOTE — NURSING NOTE
Spoke with MD and .   met with patient and daughter this am. Reports daughter would like to wait another day to discuss Living Will completion due to patients pain and discomfort.  Will continue to follow.    Magdalene ANGULO RN, [facility]  Palliative Care

## 2024-12-11 NOTE — SIGNIFICANT NOTE
12/11/24 0745   OTHER   Discipline occupational therapist   Rehab Time/Intention   Session Not Performed patient unavailable for treatment  (with dietician)

## 2024-12-11 NOTE — PLAN OF CARE
Goal Outcome Evaluation:  Plan of Care Reviewed With: patient           Outcome Evaluation: Patient did not wear bipap  overnight. Bipap is at bedside on standby. Patient is on room air.

## 2024-12-11 NOTE — PROGRESS NOTES
RT EQUIPMENT DEVICE RELATED - SKIN ASSESSMENT    RT Medical Equipment/Device:     NIV Mask:  Full-face    size: s    Skin Assessment:      Cheek:  Intact  Chin:  Intact  Nares:  Intact  Neck:  Intact  Mouth:  Intact    Device Skin Pressure Protection:  Pressure points protected    Nurse Notification:  Zayda Juan, RRT

## 2024-12-11 NOTE — PLAN OF CARE
Goal Outcome Evaluation:   Patient did not wear Bipap tonight due to nausea. She remained on room air and in no respiratory distress.

## 2024-12-11 NOTE — SIGNIFICANT NOTE
12/11/24 1100   Physical Therapy Time and Intention   Session Not Performed patient unavailable for evaluation  (x2 attempts: with palliative nurse, hospitalist)

## 2024-12-11 NOTE — PLAN OF CARE
Goal Outcome Evaluation:              Problem: Adult Inpatient Plan of Care  Goal: Plan of Care Review  Outcome: Progressing        Pt and family very demanding throughout entire shift; pt c/o of constant pain and has repeated request for all meds, Gtube is working well with 40cc of residual, pt refuses to wear pulse ox probe or oxygen; pt and family spoke with Rodger the director of this unit, started tube feeds, continue to monitor and support. Pt has periods of forgetfulness and confusion; bed alarm in place.

## 2024-12-12 ENCOUNTER — READMISSION MANAGEMENT (OUTPATIENT)
Dept: CALL CENTER | Facility: HOSPITAL | Age: 59
End: 2024-12-12
Payer: COMMERCIAL

## 2024-12-12 VITALS
SYSTOLIC BLOOD PRESSURE: 100 MMHG | HEART RATE: 84 BPM | DIASTOLIC BLOOD PRESSURE: 76 MMHG | RESPIRATION RATE: 18 BRPM | OXYGEN SATURATION: 92 % | TEMPERATURE: 98.1 F | BODY MASS INDEX: 20.59 KG/M2 | HEIGHT: 66 IN | WEIGHT: 128.09 LBS

## 2024-12-12 LAB
ALBUMIN SERPL-MCNC: 3.5 G/DL (ref 3.5–5.2)
ALBUMIN/GLOB SERPL: 1.2 G/DL
ALP SERPL-CCNC: 49 U/L (ref 39–117)
ALT SERPL W P-5'-P-CCNC: 10 U/L (ref 1–33)
ANION GAP SERPL CALCULATED.3IONS-SCNC: 10.2 MMOL/L (ref 5–15)
AST SERPL-CCNC: 18 U/L (ref 1–32)
BACTERIA SPEC AEROBE CULT: NORMAL
BACTERIA SPEC AEROBE CULT: NORMAL
BASOPHILS # BLD AUTO: 0.01 10*3/MM3 (ref 0–0.2)
BASOPHILS NFR BLD AUTO: 0.2 % (ref 0–1.5)
BILIRUB SERPL-MCNC: 0.5 MG/DL (ref 0–1.2)
BUN SERPL-MCNC: 15 MG/DL (ref 6–20)
BUN/CREAT SERPL: 32.6 (ref 7–25)
CALCIUM SPEC-SCNC: 8.5 MG/DL (ref 8.6–10.5)
CHLORIDE SERPL-SCNC: 99 MMOL/L (ref 98–107)
CO2 SERPL-SCNC: 26.8 MMOL/L (ref 22–29)
CREAT SERPL-MCNC: 0.46 MG/DL (ref 0.57–1)
DEPRECATED RDW RBC AUTO: 41.9 FL (ref 37–54)
EGFRCR SERPLBLD CKD-EPI 2021: 110.4 ML/MIN/1.73
EOSINOPHIL # BLD AUTO: 0.04 10*3/MM3 (ref 0–0.4)
EOSINOPHIL NFR BLD AUTO: 0.8 % (ref 0.3–6.2)
ERYTHROCYTE [DISTWIDTH] IN BLOOD BY AUTOMATED COUNT: 13.5 % (ref 12.3–15.4)
GLOBULIN UR ELPH-MCNC: 3 GM/DL
GLUCOSE BLDC GLUCOMTR-MCNC: 138 MG/DL (ref 70–99)
GLUCOSE BLDC GLUCOMTR-MCNC: 227 MG/DL (ref 70–99)
GLUCOSE SERPL-MCNC: 137 MG/DL (ref 65–99)
HCT VFR BLD AUTO: 41.3 % (ref 34–46.6)
HGB BLD-MCNC: 12.7 G/DL (ref 12–15.9)
IMM GRANULOCYTES # BLD AUTO: 0.04 10*3/MM3 (ref 0–0.05)
IMM GRANULOCYTES NFR BLD AUTO: 0.8 % (ref 0–0.5)
LYMPHOCYTES # BLD AUTO: 0.67 10*3/MM3 (ref 0.7–3.1)
LYMPHOCYTES NFR BLD AUTO: 12.8 % (ref 19.6–45.3)
MAGNESIUM SERPL-MCNC: 1.9 MG/DL (ref 1.6–2.6)
MCH RBC QN AUTO: 26 PG (ref 26.6–33)
MCHC RBC AUTO-ENTMCNC: 30.8 G/DL (ref 31.5–35.7)
MCV RBC AUTO: 84.6 FL (ref 79–97)
MONOCYTES # BLD AUTO: 0.42 10*3/MM3 (ref 0.1–0.9)
MONOCYTES NFR BLD AUTO: 8 % (ref 5–12)
NEUTROPHILS NFR BLD AUTO: 4.06 10*3/MM3 (ref 1.7–7)
NEUTROPHILS NFR BLD AUTO: 77.4 % (ref 42.7–76)
NRBC BLD AUTO-RTO: 0 /100 WBC (ref 0–0.2)
PHOSPHATE SERPL-MCNC: 3.4 MG/DL (ref 2.5–4.5)
PLATELET # BLD AUTO: 153 10*3/MM3 (ref 140–450)
PMV BLD AUTO: 10.7 FL (ref 6–12)
POTASSIUM SERPL-SCNC: 4.1 MMOL/L (ref 3.5–5.2)
PROT SERPL-MCNC: 6.5 G/DL (ref 6–8.5)
RBC # BLD AUTO: 4.88 10*6/MM3 (ref 3.77–5.28)
SODIUM SERPL-SCNC: 136 MMOL/L (ref 136–145)
WBC NRBC COR # BLD AUTO: 5.24 10*3/MM3 (ref 3.4–10.8)

## 2024-12-12 PROCEDURE — 25010000002 HYDROMORPHONE PER 4 MG: Performed by: STUDENT IN AN ORGANIZED HEALTH CARE EDUCATION/TRAINING PROGRAM

## 2024-12-12 PROCEDURE — 25010000002 HEPARIN LOCK FLUSH PER 10 UNITS: Performed by: STUDENT IN AN ORGANIZED HEALTH CARE EDUCATION/TRAINING PROGRAM

## 2024-12-12 PROCEDURE — 97161 PT EVAL LOW COMPLEX 20 MIN: CPT

## 2024-12-12 PROCEDURE — 80053 COMPREHEN METABOLIC PANEL: CPT | Performed by: INTERNAL MEDICINE

## 2024-12-12 PROCEDURE — 84100 ASSAY OF PHOSPHORUS: CPT | Performed by: STUDENT IN AN ORGANIZED HEALTH CARE EDUCATION/TRAINING PROGRAM

## 2024-12-12 PROCEDURE — 94799 UNLISTED PULMONARY SVC/PX: CPT

## 2024-12-12 PROCEDURE — 82948 REAGENT STRIP/BLOOD GLUCOSE: CPT

## 2024-12-12 PROCEDURE — 94761 N-INVAS EAR/PLS OXIMETRY MLT: CPT

## 2024-12-12 PROCEDURE — 94664 DEMO&/EVAL PT USE INHALER: CPT

## 2024-12-12 PROCEDURE — 63710000001 INSULIN REGULAR HUMAN PER 5 UNITS: Performed by: INTERNAL MEDICINE

## 2024-12-12 PROCEDURE — 82948 REAGENT STRIP/BLOOD GLUCOSE: CPT | Performed by: INTERNAL MEDICINE

## 2024-12-12 PROCEDURE — 25010000002 ENOXAPARIN PER 10 MG: Performed by: INTERNAL MEDICINE

## 2024-12-12 PROCEDURE — 83735 ASSAY OF MAGNESIUM: CPT | Performed by: STUDENT IN AN ORGANIZED HEALTH CARE EDUCATION/TRAINING PROGRAM

## 2024-12-12 PROCEDURE — 25010000002 DEXAMETHASONE SODIUM PHOSPHATE 10 MG/ML SOLUTION: Performed by: INTERNAL MEDICINE

## 2024-12-12 PROCEDURE — 85025 COMPLETE CBC W/AUTO DIFF WBC: CPT | Performed by: STUDENT IN AN ORGANIZED HEALTH CARE EDUCATION/TRAINING PROGRAM

## 2024-12-12 PROCEDURE — 99239 HOSP IP/OBS DSCHRG MGMT >30: CPT | Performed by: STUDENT IN AN ORGANIZED HEALTH CARE EDUCATION/TRAINING PROGRAM

## 2024-12-12 RX ORDER — NICOTINE 21 MG/24HR
1 PATCH, TRANSDERMAL 24 HOURS TRANSDERMAL
Qty: 14 PATCH | Refills: 0 | Status: SHIPPED | OUTPATIENT
Start: 2024-12-13 | End: 2024-12-26

## 2024-12-12 RX ORDER — SODIUM CHLORIDE 9 MG/ML
40 INJECTION, SOLUTION INTRAVENOUS AS NEEDED
Status: DISCONTINUED | OUTPATIENT
Start: 2024-12-12 | End: 2024-12-12 | Stop reason: HOSPADM

## 2024-12-12 RX ORDER — TEMAZEPAM 15 MG/1
15 CAPSULE ORAL NIGHTLY
Qty: 14 CAPSULE | Refills: 0 | Status: SHIPPED | OUTPATIENT
Start: 2024-12-12 | End: 2024-12-26

## 2024-12-12 RX ORDER — ALPRAZOLAM 0.25 MG/1
0.5 TABLET ORAL EVERY 12 HOURS PRN
Status: DISCONTINUED | OUTPATIENT
Start: 2024-12-12 | End: 2024-12-12 | Stop reason: HOSPADM

## 2024-12-12 RX ORDER — METRONIDAZOLE 500 MG/1
500 TABLET ORAL EVERY 8 HOURS SCHEDULED
Qty: 21 TABLET | Refills: 0 | Status: SHIPPED | OUTPATIENT
Start: 2024-12-12 | End: 2024-12-19

## 2024-12-12 RX ORDER — SODIUM CHLORIDE 0.9 % (FLUSH) 0.9 %
20 SYRINGE (ML) INJECTION AS NEEDED
Status: DISCONTINUED | OUTPATIENT
Start: 2024-12-12 | End: 2024-12-12 | Stop reason: HOSPADM

## 2024-12-12 RX ORDER — SODIUM CHLORIDE 0.9 % (FLUSH) 0.9 %
10 SYRINGE (ML) INJECTION AS NEEDED
Status: DISCONTINUED | OUTPATIENT
Start: 2024-12-12 | End: 2024-12-12 | Stop reason: HOSPADM

## 2024-12-12 RX ORDER — DICYCLOMINE HYDROCHLORIDE 10 MG/1
10 CAPSULE ORAL
Qty: 12 CAPSULE | Refills: 0 | Status: SHIPPED | OUTPATIENT
Start: 2024-12-12 | End: 2024-12-15

## 2024-12-12 RX ORDER — LOPERAMIDE HYDROCHLORIDE 2 MG/1
4 CAPSULE ORAL 4 TIMES DAILY PRN
Qty: 20 CAPSULE | Refills: 0 | Status: SHIPPED | OUTPATIENT
Start: 2024-12-12 | End: 2024-12-17

## 2024-12-12 RX ORDER — OXYCODONE HYDROCHLORIDE 10 MG/1
10 TABLET ORAL EVERY 8 HOURS PRN
Qty: 9 TABLET | Refills: 0 | Status: SHIPPED | OUTPATIENT
Start: 2024-12-12 | End: 2024-12-15

## 2024-12-12 RX ORDER — SODIUM CHLORIDE 0.9 % (FLUSH) 0.9 %
10 SYRINGE (ML) INJECTION EVERY 12 HOURS SCHEDULED
Status: DISCONTINUED | OUTPATIENT
Start: 2024-12-12 | End: 2024-12-12 | Stop reason: HOSPADM

## 2024-12-12 RX ORDER — HEPARIN SODIUM (PORCINE) LOCK FLUSH IV SOLN 100 UNIT/ML 100 UNIT/ML
5 SOLUTION INTRAVENOUS ONCE
Status: COMPLETED | OUTPATIENT
Start: 2024-12-12 | End: 2024-12-12

## 2024-12-12 RX ADMIN — HYDROMORPHONE HYDROCHLORIDE 2 MG: 1 INJECTION, SOLUTION INTRAMUSCULAR; INTRAVENOUS; SUBCUTANEOUS at 03:38

## 2024-12-12 RX ADMIN — HYDROMORPHONE HYDROCHLORIDE 2 MG: 1 INJECTION, SOLUTION INTRAMUSCULAR; INTRAVENOUS; SUBCUTANEOUS at 06:33

## 2024-12-12 RX ADMIN — OXYCODONE 10 MG: 5 TABLET ORAL at 05:27

## 2024-12-12 RX ADMIN — HEPARIN 500 UNITS: 100 SYRINGE at 14:15

## 2024-12-12 RX ADMIN — ENOXAPARIN SODIUM 40 MG: 100 INJECTION SUBCUTANEOUS at 10:11

## 2024-12-12 RX ADMIN — HYDROMORPHONE HYDROCHLORIDE 2 MG: 1 INJECTION, SOLUTION INTRAMUSCULAR; INTRAVENOUS; SUBCUTANEOUS at 13:24

## 2024-12-12 RX ADMIN — Medication 10 ML: at 09:05

## 2024-12-12 RX ADMIN — HYDROMORPHONE HYDROCHLORIDE 2 MG: 1 INJECTION, SOLUTION INTRAMUSCULAR; INTRAVENOUS; SUBCUTANEOUS at 00:28

## 2024-12-12 RX ADMIN — DEXAMETHASONE SODIUM PHOSPHATE 6 MG: 10 INJECTION INTRAMUSCULAR; INTRAVENOUS at 10:12

## 2024-12-12 RX ADMIN — BUDESONIDE 0.5 MG: 0.5 INHALANT RESPIRATORY (INHALATION) at 07:56

## 2024-12-12 RX ADMIN — DICYCLOMINE HYDROCHLORIDE 10 MG: 10 CAPSULE ORAL at 10:13

## 2024-12-12 RX ADMIN — HYDROMORPHONE HYDROCHLORIDE 2 MG: 1 INJECTION, SOLUTION INTRAMUSCULAR; INTRAVENOUS; SUBCUTANEOUS at 10:24

## 2024-12-12 RX ADMIN — ARFORMOTEROL TARTRATE 15 MCG: 15 SOLUTION RESPIRATORY (INHALATION) at 07:56

## 2024-12-12 RX ADMIN — ALPRAZOLAM 0.5 MG: 0.25 TABLET ORAL at 03:38

## 2024-12-12 RX ADMIN — INSULIN HUMAN 3 UNITS: 100 INJECTION, SOLUTION PARENTERAL at 12:53

## 2024-12-12 NOTE — PLAN OF CARE
Goal Outcome Evaluation:           Progress: no change  Outcome Evaluation: Patient saturation was 81% when entering room stated she was up doing things she did recover after a couple minutes to 94% on room air. She did take her breathing treatment this morning and bipap is on standby at bedside.

## 2024-12-12 NOTE — PLAN OF CARE
"  Problem: Adult Inpatient Plan of Care  Goal: Plan of Care Review  Outcome: Progressing  Flowsheets (Taken 12/12/2024 0423)  Progress: no change  Outcome Evaluation: pt has refesued her scheduled medications this shift. pt states\" im allergic or i have a bad reaction to those meds.\" pt has frequently called out staff. pt was very restless this shift. md was notified and increased the dose of ativan. pt has been very agitated this shift. she has been re-educated on NPO status and why she is npo. mouth swabs have been offered multiple times during shift. pt has c/o of severe pain several times this shift. given prn oxy and dilaudid, see MAR. pt has tolerated tube feeds and has increased her rate from 20 to 40ml/hr this shift. goal is 65ml/hr. will continue plan of care.  Plan of Care Reviewed With: patient  Mikayla Somers RN                                             "

## 2024-12-12 NOTE — PLAN OF CARE
Goal Outcome Evaluation:   Patient discharged home with daughter as primary caregiver, with tube feedings.  [facility] will deliver tube feeding supplies, as well as educate patient and daughter regarding use of feeding pump and formula. Left chest port de-accessed, flushed with 10cc's normal saline and 500units of heparin flush. Patient daughter to be responsible for making follow up appointments.

## 2024-12-12 NOTE — THERAPY EVALUATION
Acute Care - Physical Therapy Initial Evaluation  J[facility] Go     Patient Name: Isha Llanes  : 1965  MRN: [id number]  Today's Date: 2024      Visit Dx:     ICD-10-CM ICD-9-CM   1. Shortness of breath  R06.02 786.05   2. COVID-19  U07.1 079.89   3. Oropharyngeal dysphagia  R13.12 787.22   4. Decreased activities of daily living (ADL)  Z78.9 V49.89   5. Difficulty walking  R26.2 719.7     Patient Active Problem List   Diagnosis    Septic shock    Acute MI    Cancer associated pain    Presence of intrathecal pump    Chronic low back pain with bilateral sciatica    Sacroiliac inflammation    Chronic pain syndrome    Pelvic pain    Aortic stenosis, severe    Dyspnea on effort    Other pulmonary embolism without acute cor pulmonale    Nonrheumatic aortic valve stenosis    Hip pain    Anxiety disorder    Allergic rhinitis due to allergen    Arthritis    Asthma    Kidney disease    CHF (congestive heart failure)    Chronic obstructive pulmonary disease    Diabetes mellitus, type 2    Depression    GERD (gastroesophageal reflux disease)    S/P TAVR (transcatheter aortic valve replacement)    Limited mobility    Venous hypertension of both lower extremities    Nicotine dependence    Mitral valve prolapse    Lupus (systemic lupus erythematosus)    Long term current use of anticoagulant therapy    History of Hodgkin's lymphoma    Hepatic steatosis    Heart murmur    CAD (coronary artery disease)    Non-healing wound of lower extremity, subsequent encounter    MSSA (methicillin susceptible Staphylococcus aureus) infection    Sepsis    Change in bowel habits    Nausea    Venous stasis ulcer of right calf    Venous stasis ulcer of left calf    Noncompliance    Peripheral vascular disease    Obesity with body mass index 30 or greater    Neurologic disorder    Neck pain    Limb pain    Hiatal hernia    Bladder disorder    Frailty    Cellulitis of right foot    Bowel disease    Atrophy of skin    Breast lump     Encounter for care related to vascular access port    Primary osteoarthritis of right hip    Tobacco abuse    Laryngeal cancer    Throat pain    Voice hoarseness    Hodgkin lymphoma    Malignant neoplasm of supraglottis    Dysphagia    Nausea vomiting and diarrhea    Weight loss, abnormal    Epigastric pain    Nausea and vomiting    History of laryngeal cancer    Anorexia    AMS (altered mental status)    Acute-on-chronic respiratory failure    UTI (urinary tract infection)    Anterior neck pain    COVID-19 virus infection    Hypoxemia     Past Medical History:   Diagnosis Date    Anesthesia     REPORTS HAS GOT REAL EMOTIONAL AND HAS BECOME ANGRY BEFORE    Anxiety     Aortic stenosis, severe     HAS BIO VALVE REPLACED    Arthritis     Asthma     Back pain     Blood clotting disorder     Cancer     Cancer associated pain     HODGKINS LYMPHOMA    CHF (congestive heart failure)     Chronic low back pain with sciatica     Chronic pain disorder     COPD (chronic obstructive pulmonary disease)     Coronary artery disease     Diabetes mellitus     TYPE 2    Diabetes mellitus     Dyspnea on effort     GERD (gastroesophageal reflux disease)     H/O lumpectomy     H/O: hysterectomy     Hiatal hernia     History of degenerative disc disease     History of kidney stones     History of pulmonary embolus (PE)     History of transfusion     AS A CHILD NO TRANSFUSION REACTION    History of transfusion     Hodgkin's lymphoma     5/19/23  5 YEARS SINCE LAST CHEMO TX    Hypertension     Immobility     Liver disease     DENIES ANY CURRENT ISSUES    Lupus     Mitral valve prolapse     Nausea vomiting and diarrhea 3/7/2024    Panic disorder     Pelvic pain     Peripheral neuropathy     Personal history of DVT (deep vein thrombosis)     Presence of intrathecal pump     PTSD (post-traumatic stress disorder)     Sacroiliac joint disease     SI (sacroiliac) joint inflammation     Sleep apnea     Venous insufficiency      Past Surgical  History:   Procedure Laterality Date    ABDOMINAL SURGERY      BACK SURGERY      SPINAL FUSION     BACK SURGERY      BLADDER REPAIR      CARDIAC CATHETERIZATION N/A 03/06/2019    Procedure: Valvuloplasty;  Surgeon: Wayne Johnson MD;  Location: [facility] INVASIVE LOCATION;  Service: Cardiology    CARDIAC CATHETERIZATION      CARDIAC CATHETERIZATION Left 5/31/2023    Procedure: Cardiac Catheterization/Vascular Study;  Surgeon: Poncho Reid MD;  Location: [facility] CATH INVASIVE LOCATION;  Service: Cardiovascular;  Laterality: Left;    CARDIAC SURGERY      CARDIAC VALVE REPLACEMENT  2021    CHOLECYSTECTOMY      COLONOSCOPY N/A 12/29/2021    Procedure: COLONOSCOPY;  Surgeon: Karine Orlando MD;  Location: [facility] ENDOSCOPY;  Service: Gastroenterology;  Laterality: N/A;  POOR PREP    COLONOSCOPY N/A 04/13/2022    Procedure: COLONOSCOPY WITH POLYPECTOMY;  Surgeon: Karine Orlando MD;  Location: [facility] ENDOSCOPY;  Service: Gastroenterology;  Laterality: N/A;  COLON POLYP, HEMORRHOIDS, POOR PREP    COLONOSCOPY      DIRECT LARYNGOSCOPY, ESOPHAGOSCOPY, BRONCHOSCOPY N/A 5/22/2023    Procedure: DIRECT LARYNGOSCOPY WITH BIOPSIES , ESOPHAGOSCOPY, BRONCHOSCOPY;  Surgeon: Ronnie Mcgarry MD;  Location: [facility] OR [facility];  Service: ENT;  Laterality: N/A;    ENDOSCOPY N/A 12/29/2021    Procedure: ESOPHAGOGASTRODUODENOSCOPY;  Surgeon: Karine Orlando MD;  Location: [facility] ENDOSCOPY;  Service: Gastroenterology;  Laterality: N/A;  ESOPHAGITIS, GASTRITIS, HIATAL HERNIA    ENDOSCOPY      ENDOSCOPY N/A 4/16/2024    Procedure: ESOPHAGOGASTRODUODENOSCOPY WITH BIOPSIES;  Surgeon: Karine Orlando MD;  Location: [facility] ENDOSCOPY;  Service: Gastroenterology;  Laterality: N/A;  ESOPHAGITIS, HIATAL HERNIA    ENDOSCOPY W/ PEG TUBE PLACEMENT N/A 12/10/2024    Procedure: ESOPHAGOGASTRODUODENOSCOPY WITH PERCUTANEOUS ENDOSCOPIC GASTROSTOMY TUBE INSERTION WITH ANESTHESIA;  Surgeon: Rodger Ortega,  MD;  Location: [facility] ENDOSCOPY;  Service: Gastroenterology;  Laterality: N/A;  PEG TUBE INSERTION    HYSTERECTOMY      LYMPHADENECTOMY      PAIN PUMP INSERTION/REVISION N/A 10/18/2019    Procedure: PAIN PUMP INSERTION [facility] 10-18-19 [place];  Surgeon: Horace Rios MD;  Location: [facility];  Service: Pain Management    PAIN PUMP INSERTION/REVISION N/A 11/23/2020    Procedure: pain pump removal;  Surgeon: Horace Rios MD;  Location: [facility];  Service: Pain Management;  Laterality: N/A;    PEG TUBE INSERTION N/A 7/26/2023    Procedure: PERCUTANEOUS ENDOSCOPIC GASTROSTOMY TUBE INSERTION;  Surgeon: Kody Mercer MD;  Location: [facility] ENDOSCOPY;  Service: General;  Laterality: N/A;  SUCCESSFUL PEG TUBE PLACE PLACEMENT    PORTACATH PLACEMENT      IN LEFT CHEST REPORTS THAT IT IS NOT FUNCTIONING    SKIN BIOPSY      TONSILLECTOMY      TUBAL ABDOMINAL LIGATION      TUMOR REMOVAL       PT Assessment (Last 12 Hours)       PT Evaluation and Treatment       Row Name 12/12/24 1100          Physical Therapy Time and Intention    Subjective Information no complaints  -DP     Document Type evaluation  -DP     Mode of Treatment individual therapy;physical therapy  -DP     Patient Effort good  -DP       Row Name 12/12/24 1100          General Information    Patient Profile Reviewed yes  -DP     Patient Observations alert;cooperative  -DP     Prior Level of Function independent:;transfer;bed mobility;ADL's;w/c or scooter  -DP     Existing Precautions/Restrictions fall  -DP     Barriers to Rehab none identified  -DP       Row Name 12/12/24 1100          Living Environment    Current Living Arrangements apartment  -DP     People in Home child(felix), adult  -DP       Row Name 12/12/24 1100          Home Use of Assistive/Adaptive Equipment    Equipment Currently Used at Home oxygen;walker, rolling;wheelchair;wheelchair, motorized  -DP       Row Name 12/12/24 1100          Range of Motion (ROM)    Range of  Motion bilateral lower extremities;ROM is WFL  -DP       Row Name 12/12/24 1100          Strength Comprehensive (MMT)    General Manual Muscle Testing (MMT) Assessment lower extremity strength deficits identified  -DP     Comment, General Manual Muscle Testing (MMT) Assessment BLE: 4-/5  -DP       Row Name 12/12/24 1100          Bed Mobility    Bed Mobility supine-sit-supine  -DP     Supine-Sit-Supine [place] (Bed Mobility) minimum assist (75% patient effort)  -DP       Row Name 12/12/24 1100          Transfers    Transfers sit-stand transfer  -DP       Row Name 12/12/24 1100          Sit-Stand Transfer    Sit-Stand [place] (Transfers) minimum assist (75% patient effort)  -DP       Row Name 12/12/24 1100          Gait/Stairs (Locomotion)    Comment, (Gait/Stairs) declined to transfer  -DP       Row Name 12/12/24 1100          Safety Issues/Impairments Affecting Functional Mobility    Impairments Affecting Function (Mobility) balance;endurance/activity tolerance;strength  -DP       Row Name             Wound 08/22/24 0629 Left lower leg Other (comment)    Wound - Properties Group Placement Date: 08/22/24  -AW Placement Time: 0629 -AW Side: Left  -AW Orientation: lower  -AW Location: leg  -AW Primary Wound Type: Other  -AW, open margaret where patient swells and weeps  Present on Original Admission: Y  -AW    Retired Wound - Properties Group Placement Date: 08/22/24  -AW Placement Time: 0629 -AW Present on Original Admission: Y  -AW Side: Left  -AW Orientation: lower  -AW Location: leg  -AW Primary Wound Type: Other  -AW, open margaret where patient swells and weeps     Retired Wound - Properties Group Placement Date: 08/22/24  -AW Placement Time: 0629  -AW Present on Original Admission: Y  -AW Side: Left  -AW Orientation: lower  -AW Location: leg  -AW Primary Wound Type: Other  -AW, open margaret where patient swells and weeps     Retired Wound - Properties Group Date first assessed: 08/22/24  -AW Time first  assessed: 0629  -AW Present on Original Admission: Y  -AW Side: Left  -AW Location: leg  -AW Primary Wound Type: Other  -AW, open margaret where patient swells and weeps       Row Name 12/12/24 1100          Plan of Care Review    Plan of Care Reviewed With patient  -DP     Outcome Evaluation Pt presents with decreased strength, transfers and functional mobility. Will benefit from inpatient PT services and continued PT services upon discharge.  -DP       Row Name 12/12/24 1100          Therapy Assessment/Plan (PT)    Criteria for Skilled Interventions Met (PT) yes;meets criteria  -DP     Therapy Frequency (PT) daily  -DP     Predicted Duration of Therapy Intervention (PT) 10 days  -DP     Problem List (PT) problems related to;mobility  -DP     Activity Limitations Related to Problem List (PT) unable to transfer safely;unable to ambulate safely  -DP       Row Name 12/12/24 1100          PT Evaluation Complexity    History, PT Evaluation Complexity no personal factors and/or comorbidities  -DP     Examination of Body Systems (PT Eval Complexity) total of 4 or more elements  -DP     Clinical Presentation (PT Evaluation Complexity) stable  -DP     Clinical Decision Making (PT Evaluation Complexity) low complexity  -DP     Overall Complexity (PT Evaluation Complexity) low complexity  -DP       Row Name 12/12/24 1100          Physical Therapy Goals    Transfer Goal Selection (PT) transfer, PT goal 1;transfer, PT goal 2  -DP       Row Name 12/12/24 1100          Transfer Goal 1 (PT)    Activity/Assistive Device (Transfer Goal 1, PT) sit-to-stand/stand-to-sit  -DP     [place] Level/Cues Needed (Transfer Goal 1, PT) supervision required  -DP     Time Frame (Transfer Goal 1, PT) 10 days  -DP       Row Name 12/12/24 1100          Transfer Goal 2 (PT)    Activity/Assistive Device (Transfer Goal 2, PT) bed-to-chair/chair-to-bed  -DP     [place] Level/Cues Needed (Transfer Goal 2, PT) supervision required  -DP     Time  Frame (Transfer Goal 2, PT) 10 days  -DP               User Key  (r) = Recorded By, (t) = Taken By, (c) = Cosigned By      Initials Name Provider Type    Madisyn Kuhn, RN Registered Nurse    Yulia Bernal PT Physical Therapist                    Physical Therapy Education        No education to display                  PT Recommendation and Plan  Anticipated Discharge Disposition (PT): sub acute care setting  Planned Therapy Interventions (PT): balance training, bed mobility training, gait training, strengthening, transfer training  Therapy Frequency (PT): daily  Plan of Care Reviewed With: patient  Outcome Evaluation: Pt presents with decreased strength, transfers and functional mobility. Will benefit from inpatient PT services and continued PT services upon discharge.   Outcome Measures       Row Name 12/12/24 1100             How much help from another person do you currently need...    Turning from your back to your side while in flat bed without using bedrails? 4  -DP      Moving from lying on back to sitting on the side of a flat bed without bedrails? 4  -DP      Moving to and from a bed to a chair (including a wheelchair)? 3  -DP      Standing up from a chair using your arms (e.g., wheelchair, bedside chair)? 3  -DP      Climbing 3-5 steps with a railing? 2  -DP      To walk in hospital room? 2  -DP      AM-PAC 6 Clicks Score (PT) 18  -DP         Functional Assessment    Outcome Measure Options AM-PAC 6 Clicks Basic Mobility (PT)  -DP                User Key  (r) = Recorded By, (t) = Taken By, (c) = Cosigned By      Initials Name Provider Type    Yulia Bernal PT Physical Therapist                     Time Calculation:    PT Charges       Row Name 12/12/24 1119             Time Calculation    PT Received On 12/12/24  -DP      PT Goal Re-Cert Due Date 12/21/24  -DP         Untimed Charges    PT Eval/Re-eval Minutes 40  -DP         Total Minutes    Untimed Charges Total Minutes 40  -DP        Total Minutes 40  -DP                User Key  (r) = Recorded By, (t) = Taken By, (c) = Cosigned By      Initials Name Provider Type    Yulia Bernal PT Physical Therapist                      PT G-Codes  Outcome Measure Options: AM-PAC 6 Clicks Basic Mobility (PT)  AM-PAC 6 Clicks Score (PT): 18  AM-PAC 6 Clicks Score (OT): 14    Yulia Casiano PT  12/12/2024

## 2024-12-12 NOTE — PLAN OF CARE
Goal Outcome Evaluation:  Plan of Care Reviewed With: patient        Progress: no change  Outcome Evaluation: Patient has not been wearing their BiPAP device. Patient has been resting comfortably on room air and maintaining SpO2 in mid 90s.

## 2024-12-12 NOTE — PROGRESS NOTES
RT EQUIPMENT DEVICE RELATED - SKIN ASSESSMENT    RT Medical Equipment/Device:      Room Air    Skin Assessment:      Cheek:  Intact  Neck:  Intact  Nose:  Intact    Device Skin Pressure Protection:   None    Nurse Notification:  No    Diamond Moncada, RRT

## 2024-12-12 NOTE — PLAN OF CARE
Goal Outcome Evaluation:  Plan of Care Reviewed With: patient           Outcome Evaluation: Pt presents with decreased strength, transfers and functional mobility. Will benefit from inpatient PT services and continued PT services upon discharge.    Anticipated Discharge Disposition (PT): sub acute care setting

## 2024-12-12 NOTE — NURSING NOTE
Discharge instructions went over with daughter, Sheila Watson. Pharmacy delivered discharge medications. Patient discharged home with daughter and son in law.  [facility] to follow up with tube feedings.

## 2024-12-12 NOTE — SIGNIFICANT NOTE
12/12/24 0923   OTHER   Discipline occupational therapist   Rehab Time/Intention   Session Not Performed patient unavailable for treatment

## 2024-12-12 NOTE — PROGRESS NOTES
RT EQUIPMENT DEVICE RELATED - SKIN ASSESSMENT    RT Medical Equipment/Device:     NIV Mask:  Full-face    size: s    Skin Assessment:      Cheek:  Intact  Chin:  Intact  Nose:  Intact  Mouth:  Intact    Device Skin Pressure Protection:  Pressure points protected    Nurse Notification:  Zayda Aguiar, CRT

## 2024-12-12 NOTE — NURSING NOTE
Patient frequently calling out asking for nurse and doctor, states that she is going home today and wants to make sure everything is set up at home for her to have tube feedings. Informed patient that [facility] has been notified and that they are running her insurance. No [facility] has accepted patient as of yet. Patient daughter called as well wanting to speak with RN stating that she needs to get things set up for home, wanting to make sure that tube feedings are delivered and wanting us  to have briefs delivered. Informed daughter that we could send a couple of briefs with her but we cannot send a whole package home and deplete our stock. Daughter verbalized understanding.

## 2024-12-12 NOTE — DISCHARGE SUMMARY
[facility]         HOSPITALIST  DISCHARGE SUMMARY    Patient Name: Isha Llanes  : 1965  MRN: [id number]    Date of Admission: 2024  Date of Discharge:  2024  Primary Care Physician: Virgil Salas MD    Consults       Date and Time Order Name Status Description    2024  3:52 PM Hematology & Oncology Inpatient Consult      2024  3:52 PM Inpatient Gastroenterology Consult Completed     2024 10:59 AM Inpatient Hospitalist Consult              Active and Resolved Hospital Problems:  Active Hospital Problems    Diagnosis POA    **COVID-19 virus infection [U07.1] Unknown    Hypoxemia [R09.02] Unknown    AMS (altered mental status) [R41.82] Unknown      Resolved Hospital Problems   No resolved problems to display.       Hospital Course     Hospital Course:  Isha Llanes is a 59 y.o. 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 Mancuso and Dr. Zelaya, Hodgkin lymphoma, Dilaudid pain pump for cancer, lupus on chronic prednisone, anxiety disorder, CAD, neuropathy, diabetes mellitus, hypertension, aortic stenosis s/p TAVR, aspiration, chronic hypoxemia as needed home oxygen use, chronic hoarseness and bedridden with contractures of lower extremities  Patient presented with increasing shortness of air and lethargy to [facility] ED.  History is obtained by talking to ED physician to be in the record and daughter at bedside as patient is lethargic.  Patient does wake up to vocal response but goes back to sleep her voice is very hoarse and difficult to comprehend, this is chronic per daughter.  Patient has been having fever along with cough.  Also has mild diarrhea.  Patient has not been vaccinated for COVID.  Workup in the ED showed temperature of 103 heart rate of 132 with soft blood pressure.  91% saturation on 4 L.  ABG showed pH of 7.36, pCO2 of 61, pO2 of 54, O2 sat 86% on 4  L.  CMP is negative.  Lactate is negative.  CBC is negative.  UA is negative.  EKG shows sinus tachycardia 122.  CTA of the chest does not show any PE does have right hilar lymphadenopathy reactive versus neoplastic, narrowing of larynx with edema and thickening similar to previous PET CT from August 22.  Also has new 7 mm right lower lobe pulm nodule.  COVID PCR is positive.  Hospitalist has been called to admit her for further management.  Patient mental condition improved but she failed swallow study with high risk for aspiration and made n.p.o.  Patient agreeable for PEG tube.  Status post PEG on December 10.  Started on tube feeding.  Tolerated it well.  Patient is clinically stable and ready to be discharged home.  She wants to go home today.   worked on tube feeding set up, family is well aware of tube feeding management at home as patient was on tube feed about a year ago.    Patient is being discharged home with home health today.  She is stable at the time of discharge.  She will follow-up with PCP in 3-7 days, needs CBC and chemistry trended during follow-up.  Advised to be compliant with medications and diet.  Patient to be n.p.o. and continue tube feeding.  High risk of aspiration if she is not compliant.  Discussed with patient's daughter which she agreed on.  Fall precautions.        DISCHARGE Follow Up Recommendations for labs and diagnostics: As mentioned above.      Day of Discharge     Vital Signs:  Temp:  [97.2 °F (36.2 °C)-98.3 °F (36.8 °C)] 98.1 °F (36.7 °C)  Heart Rate:  [] 84  Resp:  [18-21] 18  BP: (100-111)/(57-88) 100/76  Physical Exam:   Constitutional: Awake, alert, no acute distress              Respiratory: Decreased to auscultation bilaterally, nonlabored respirations               Cardiovascular: Left upper port chest with a healed scar, RRR, 2/6 murmurs, rubs, or gallops, palpable pedal pulses bilaterally              Gastrointestinal: Positive PEG in upper center  abdomen, positive bowel sounds, soft, nontender, nondistended              Musculoskeletal: Chronic +1 bilateral ankle edema, no clubbing or cyanosis to extremities              Psychiatric: Appropriate affect, cooperative              Neurologic: Oriented x 3, contracture of lower extremities, Cranial Nerves grossly intact to confrontation, speech clear although chronically hoarse              Skin: Chronic stasis changes bilateral lower extremities    Discharge Details        Discharge Medications        New Medications        Instructions Start Date   dicyclomine 10 MG capsule  Commonly known as: BENTYL   10 mg, Oral, 4 Times Daily Before Meals & Nightly      loperamide 2 MG capsule  Commonly known as: IMODIUM   4 mg, Oral, 4 Times Daily PRN      metroNIDAZOLE 500 MG tablet  Commonly known as: FLAGYL   500 mg, Per G Tube, Every 8 Hours Scheduled      naloxone 4 MG/0.1ML nasal spray  Commonly known as: NARCAN   Call 911. Don't prime. [place] in 1 nostril for overdose. Repeat in 2-3 minutes in other nostril if no or minimal breathing/responsiveness.      nicotine 21 MG/24HR patch  Commonly known as: NICODERM CQ   1 patch, Transdermal, Every 24 Hours Scheduled   Start Date: December 13, 2024     oxyCODONE 10 MG tablet  Commonly known as: ROXICODONE   10 mg, Oral, Every 8 Hours PRN      temazepam 15 MG capsule  Commonly known as: RESTORIL   15 mg, Oral, Nightly             Continue These Medications        Instructions Start Date   albuterol sulfate  (90 Base) MCG/ACT inhaler  Commonly known as: PROVENTIL HFA;VENTOLIN HFA;PROAIR HFA   2 puffs, Inhalation, Every 4 Hours PRN      ALPRAZolam 0.25 MG tablet  Commonly known as: XANAX   0.5 mg, Oral, 2 Times Daily PRN      cetirizine 10 MG tablet  Commonly known as: zyrTEC   10 mg, Oral, Daily      clopidogrel 75 MG tablet  Commonly known as: PLAVIX   75 mg, Oral, Daily      mupirocin 2 % ointment  Commonly known as: BACTROBAN   1 Application, Topical, 3 Times Daily       pain patient supplied pump   Intrathecal, Continuous, Type of Medication: Hydromorphone 15 mg/ml and Bupivacaine 0.5 mg/ml [employer] [phone] Provider:Dr. Boudreaux Provider number: [phone] Basal Dose: Hydromorphone 7.518 mg/day Bupivacaine 0.62015 mg/day Pump capacity: 40ml ( MAX of Hydromorphone 9.456 mg/ day; Bupivacaine 0.28301 mg /day) Bolus Dose:Hydromorphone 0.500 mg, Bupivacaine 0.38259 mg Max activations: 4 per day Lockout 3 hours. 1 Bolus per every 3 hours  Next refill-  01/07/2025 Alarm date- 01/12/2025 Pump manufacture:[employer]      predniSONE 20 MG tablet  Commonly known as: DELTASONE   2 tablets, Daily      Tylenol 325 MG tablet  Generic drug: acetaminophen   325 mg, Oral, Every 6 Hours PRN               Allergies   Allergen Reactions    Amoxicillin Shortness Of Breath     Has tolerated Cefazolin, Ceftriaxone, and Cefepime -[facility]    Ceclor [Cefaclor] Shortness Of Breath     Has tolerated Cefazolin, Ceftriaxone, and Cefepime -[facility]      Penicillins Shortness Of Breath     Has tolerated Cefazolin, Ceftriaxone, and Cefepime -[facility]    Sulfa Antibiotics Rash    Valium [Diazepam] Mental Status Change     DEPRESSED    Ambien [Zolpidem] Mental Status Change    Aspirin GI Intolerance    Ativan [Lorazepam] Mental Status Change    Benadryl [Diphenhydramine] Anxiety     AND MAKES HER HYPER    Biaxin [Clarithromycin] Unknown - Low Severity    Cephalexin Unknown - Low Severity    Clindamycin Unknown - Low Severity    Compazine [Prochlorperazine Edisylate] Rash    Contrast Dye (Echo Or Unknown Ct/Mr) Other (See Comments)     Caused pain in her arm    Doxycycline Rash    Nsaids GI Intolerance    Phenergan [Promethazine Hcl] GI Intolerance    Promethazine GI Intolerance    Vancomycin Itching       Discharge Disposition:  Home or Self Care    Diet:  Hospital:  Diet Order   Procedures    NPO Diet NPO Type: Strict NPO       Discharge Activity:       CODE  STATUS:  Code Status and Medical Interventions: CPR (Attempt to Resuscitate); Full Support   Ordered at: 12/07/24 1140     Code Status (Patient has no pulse and is not breathing):    CPR (Attempt to Resuscitate)     Medical Interventions (Patient has pulse or is breathing):    Full Support       Future Appointments   Date Time Provider Department Center   3/13/2025 10:00 AM Goyo Nunez [place] [facility] [place] [facility]   3/27/2025  1:45 PM Sobeida Espinosa APRN [facility]       Additional Instructions for the Follow-ups that You Need to Schedule       Discharge Follow-up with PCP   As directed       Currently Documented PCP:    Virgil Salas MD    PCP Phone Number:    [phone]     Follow Up Details: In 3-7 days; needs cbc and chemistries trended during follow up.                Pertinent  and/or Most Recent Results     PROCEDURES:       LAB RESULTS:      Lab 12/12/24  0536 12/10/24  1736 12/07/24  1351 12/07/24  0932 12/07/24  0820 12/07/24  0757   WBC 5.24 4.53  --  5.69  --   --    HEMOGLOBIN 12.7 11.9*  --  12.4  --   --    HEMATOCRIT 41.3 39.0  --  41.5  --   --    PLATELETS 153 115*  --  145  --   --    NEUTROS ABS 4.06 3.72  --  4.36  --   --    IMMATURE GRANS (ABS) 0.04 0.01  --  0.04  --   --    LYMPHS ABS 0.67* 0.57*  --  0.51*  --   --    MONOS ABS 0.42 0.21  --  0.74  --   --    EOS ABS 0.04 0.01  --  0.03  --   --    MCV 84.6 87.6  --  90.4  --   --    CRP  --   --   --   --   --  10.88*   PROCALCITONIN  --   --   --   --   --  0.36*   LACTATE  --  0.8 0.7  --  0.8 1.4   LDH  --   --   --   --   --  403*   D DIMER QUANT  --   --   --  0.69*  --   --          Lab 12/12/24  0536 12/11/24  0551 12/10/24  1736 12/10/24  0622 12/09/24  0526 12/08/24  0428 12/07/24  0757   SODIUM 136 138 143 141 141   < > 136   POTASSIUM 4.1 3.8 3.5 4.1 4.2   < > 4.4   CHLORIDE 99 101 104 102 102   < > 96*   CO2 26.8 25.3 26.7 31.5* 28.8   < > 29.5*   ANION GAP 10.2 11.7 12.3 7.5 10.2   < > 10.5   BUN 15 10 12 14 20    < > 11   CREATININE 0.46* 0.50* 0.59 0.50* 0.52*   < > 0.58   EGFR 110.4 108.2 104.0 108.2 107.2   < > 104.4   GLUCOSE 137* 74 90 89 131*   < > 127*   CALCIUM 8.5* 8.0* 7.5* 8.4* 8.4*   < > 8.8   MAGNESIUM 1.9 1.6  --   --   --   --  1.7   PHOSPHORUS 3.4 3.4  --   --   --   --   --     < > = values in this interval not displayed.         Lab 12/12/24  0536 12/11/24  0551 12/10/24  1736 12/10/24  0622 12/09/24  0526   TOTAL PROTEIN 6.5 6.2 5.7* 5.9* 5.8*   ALBUMIN 3.5 3.4* 3.1* 3.2* 3.0*   GLOBULIN 3.0 2.8 2.6 2.7 2.8   ALT (SGPT) 10 11 10 12 13   AST (SGOT) 18 21 18 20 21   BILIRUBIN 0.5 0.5 0.3 0.3 0.2   ALK PHOS 49 45 43 44 47         Lab 12/11/24  0551 12/10/24  1736 12/07/24  2152 12/07/24  1910 12/07/24  0757   PROBNP  --   --   --   --  627.1   HSTROP T 20* 18* 21* 24* 42*             Lab 12/07/24  0757   FERRITIN 151.20*         Lab 12/07/24  1351 12/07/24  0820   PH, ARTERIAL 7.340* 7.367   PCO2, ARTERIAL 63.4* 61.3*   PO2 ART 66.1* 54.7*   O2 SATURATION ART 90.7* 85.8*   FIO2  --  36   HCO3 ART 34.2* 35.2*   BASE EXCESS ART 6.0* 7.5*     Brief Urine Lab Results  (Last result in the past 365 days)        Color   Clarity   Blood   Leuk Est   Nitrite   Protein   CREAT   Urine HCG        12/07/24 0941 Yellow   Clear   Negative   Negative   Negative   Negative                 Microbiology Results (last 10 days)       Procedure Component Value - Date/Time    Legionella Antigen, Urine - Urine, Urine, Clean Catch [[id number]]  (Normal) Collected: 12/08/24 2320    Lab Status: Final result Specimen: Urine, Clean Catch Updated: 12/09/24 0819     LEGIONELLA ANTIGEN, URINE Negative    S. Pneumo Ag Urine or CSF - Urine, Urine, Clean Catch [[id number]]  (Normal) Collected: 12/08/24 2320    Lab Status: Final result Specimen: Urine, Clean Catch Updated: 12/09/24 0819     Strep Pneumo Ag Negative    Respiratory Panel PCR w/COVID-19(SARS-CoV-2) TANIKA/MARY/MINNIE/PAD/COR/ELLEN In-House, NP Swab in UTM/VTM, 2 HR TAT - Swab, Nasopharynx  [[id number]]  (Abnormal) Collected: 12/07/24 1908    Lab Status: Final result Specimen: Swab from Nasopharynx Updated: 12/07/24 2015     ADENOVIRUS, PCR Not Detected     Coronavirus 229E Not Detected     Coronavirus HKU1 Not Detected     Coronavirus NL63 Not Detected     Coronavirus OC43 Not Detected     COVID19 Detected     Human Metapneumovirus Not Detected     Human Rhinovirus/Enterovirus Not Detected     Influenza A PCR Not Detected     Influenza B PCR Not Detected     Parainfluenza Virus 1 Not Detected     Parainfluenza Virus 2 Not Detected     Parainfluenza Virus 3 Not Detected     Parainfluenza Virus 4 Not Detected     RSV, PCR Not Detected     Bordetella pertussis pcr Not Detected     Bordetella parapertussis PCR Not Detected     Chlamydophila pneumoniae PCR Not Detected     Mycoplasma pneumo by PCR Not Detected    Narrative:      In the setting of a positive respiratory panel with a viral infection PLUS a negative procalcitonin without other underlying concern for bacterial infection, consider observing off antibiotics or discontinuation of antibiotics and continue supportive care. If the respiratory panel is positive for atypical bacterial infection (Bordetella pertussis, Chlamydophila pneumoniae, or Mycoplasma pneumoniae), consider antibiotic de-escalation to target atypical bacterial infection.    MRSA Screen, PCR (Inpatient) - Swab, Nares [[id number]]  (Normal) Collected: 12/07/24 1908    Lab Status: Final result Specimen: Swab from Nares Updated: 12/07/24 2024     MRSA PCR No MRSA Detected    Narrative:      The negative predictive value of this diagnostic test is high and should only be used to consider de-escalating anti-MRSA therapy. A positive result may indicate colonization with MRSA and must be correlated clinically.    Blood Culture - Blood, Arm, Right [[id number]]  (Normal) Collected: 12/07/24 0757    Lab Status: Final result Specimen: Blood from Arm, Right Updated: 12/12/24 0830     Blood  Culture No growth at 5 days    Blood Culture - Blood, Arm, Left [[id number]]  (Normal) Collected: 12/07/24 0757    Lab Status: Final result Specimen: Blood from Arm, Left Updated: 12/12/24 0830     Blood Culture No growth at 5 days    COVID-19, FLU A/B, RSV PCR 1 HR TAT - Swab, Nasopharynx [[id number]]  (Abnormal) Collected: 12/07/24 0757    Lab Status: Final result Specimen: Swab from Nasopharynx Updated: 12/07/24 0943     COVID19 Detected     Influenza A PCR Not Detected     Influenza B PCR Not Detected     RSV, PCR Not Detected    Narrative:      Fact sheet for providers: https://www.fda.gov/media/108470/download    Fact sheet for patients: https://www.fda.gov/media/730000/download    Test performed by PCR.            CT Abdomen Pelvis Without Contrast    Result Date: 12/10/2024  1.Inflammation of the ascending colon consistent with colitis. The PEG tube appears appropriately placed. 2.0.8 cm right lower lobe pulmonary nodule. This is new as compared to the prior study. 3-month follow-up recommended. 3.Additional chronic findings as described above. Electronically Signed: Sony Escobedo MD  12/10/2024 7:11 PM EST  Workstation ID: [id number]    IR J or G Tube Contrast Eval    Result Date: 12/10/2024  Impression: Gastrostomy tube in satisfactory position. Electronically Signed: Odilon Sheriff MD  12/10/2024 7:00 PM EST  Workstation ID: [id number]    XR Chest 1 View    Result Date: 12/10/2024  Impression: Mild right basilar opacity, which could reflect atelectasis or pneumonia. Electronically Signed: Ryan Farnsworth MD  12/10/2024 6:33 PM EST  Workstation ID: [id number]    FL Video Swallow With Speech Single Contrast    Result Date: 12/10/2024  Impression: Tracheal aspiration of nectar and honey thick liquid barium and applesauce with barium Please consult speech pathology report for further details regarding the exam and for dietary recommendations. Report dictated by: Liz Pisano  I have personally reviewed this case  and agree with the findings above: Electronically Signed: Manuelito Almanzar MD  12/10/2024 9:08 AM EST  Workstation ID: [id number]    CT Angiogram Chest Pulmonary Embolism    Result Date: 12/7/2024  1.No evidence of pulmonary embolus. 2. Pathologically enlarged hilar lymph node on the right. Could be reactive or neoplastic. 3.Moderate emphysema. 4.Narrowing of the airway at the level of the larynx with soft tissue edema and thickening at this location similar to previous CT chest. Question posttreatment changes or active malignancy. Correlate for signs or symptoms of upper airway obstruction. 5.New 7 mm pulmonary nodule right lower lobe. Follow-up CT chest in 3 months time is recommended. Electronically Signed: Mara Pritchard MD  12/7/2024 9:38 AM EST  Workstation ID: [id number]    XR Chest 1 View    Result Date: 12/7/2024  Impression: 1.Pulmonary vascular congestion, which could reflect mild pulmonary edema. 2.No focal pneumonia identified. Electronically Signed: Ryan Farnsworth MD  12/7/2024 7:34 AM EST  Workstation ID: [id number]              Results for orders placed during the hospital encounter of 05/26/23    Adult Transthoracic Echo Complete w/ Color, Spectral and Contrast if necessary per protocol    Interpretation Summary    Left ventricular ejection fraction appears to be 46 - 50%.    Left ventricular wall thickness is consistent with mild concentric hypertrophy.    Left ventricular diastolic function was indeterminate.    There is a TAVR valve present.    Moderate to severe tricuspid valve regurgitation is present.    Estimated right ventricular systolic pressure from tricuspid regurgitation is mildly elevated (35-45 mmHg).    There were no apparent intracardiac masses, vegetations or thrombi.      Labs Pending at Discharge:        Time spent on Discharge including face to face service:  35 minutes    Electronically signed by Ruperto Abdi MD, 12/12/24, 1:36 PM EST.

## 2024-12-12 NOTE — PROGRESS NOTES
[facility]     Progress Note    Patient Name: Isha Llanes  : 1965  MRN: [id number]  Primary Care Physician:  Virgil Salas MD  Date of admission: 2024    Subjective   Subjective     Chief Complaint: Patient stable and doing well getting feedings through the PEG tube    HPI: No specific new complaints not in acute distress    Review of Systems   All systems were reviewed and negative except for: Has been reviewed    Objective   Objective     Vitals:   Temp:  [97.2 °F (36.2 °C)-98.3 °F (36.8 °C)] 97.3 °F (36.3 °C)  Heart Rate:  [] 73  Resp:  [20-21] 20  BP: (101-121)/(57-88) 111/88    Physical Exam    Constitutional: Alert and oriented not in acute distress   Eyes: Not examined   HENT: No change   Neck: Definite change   Respiratory: Not examined   Cardiovascular: No significant findings   Gastrointestinal: Has PEG tube noted abnormalities   Musculoskeletal: No deformities   Neurologic:No Localizing signs  Skin: No rash       Result Review    Result Review:  I have personally reviewed the results from the time of this admission to 2024 08:04 EST and agree with these findings:  [x]  Laboratory  []  Microbiology  []  Radiology  []  EKG/Telemetry   []  Cardiology/Vascular   []  Pathology  []  Old records  []  Other:  Most notable findings include: Stable and doing well    Assessment & Plan   Assessment / Plan     Brief Patient Summary:  Isha Llanes is a 59 y.o. female who patient with history of laryngeal cancer status post radiation    Active Hospital Problems:  Active Hospital Problems    Diagnosis     **COVID-19 virus infection     Hypoxemia     AMS (altered mental status)        Plan:   Continue as per primary    VTE Prophylaxis:  Pharmacologic & mechanical VTE prophylaxis orders are present.        CODE STATUS:   Code Status (Patient has no pulse and is not breathing): CPR (Attempt to Resuscitate)  Medical Interventions (Patient has pulse or is breathing): Full  Support    Disposition:  I expect patient to be discharged after the patient has been stabilized.    Electronically signed by Teodoro Mancuso MD, 12/12/24, 8:04 AM EST.      Part of this note may be an electronic transcription/translation of spoken language to printed text using the Dragon Dictation System.

## 2024-12-13 NOTE — OUTREACH NOTE
Prep Survey      Flowsheet Row Responses   [facility] facility patient discharged from? [facility]   Is LACE score < 7 ? No   Eligibility Readm Mgmt   Discharge diagnosis ENDOSCOPY W/ PEG TUBE PLACEMENT-COVID-19 virus infection   Does the patient have one of the following disease processes/diagnoses(primary or secondary)? General Surgery   Does the patient have Home health ordered? Yes   What is the Home health agency?  [facility] stating they have the formula for patient in stock, and they will be able to deliver formula today and do a virtual teaching with patient and daughter. No other needs identified at this time.   Is there a DME ordered? No   Prep survey completed? Yes            HAKEEM SANCHEZ - Registered Nurse

## 2024-12-16 ENCOUNTER — READMISSION MANAGEMENT (OUTPATIENT)
Dept: CALL CENTER | Facility: HOSPITAL | Age: 59
End: 2024-12-16
Payer: COMMERCIAL

## 2024-12-16 NOTE — OUTREACH NOTE
General Surgery Week 1 Survey      Flowsheet Row Responses   [facility] facility patient discharged from? Go   Does the patient have one of the following disease processes/diagnoses(primary or secondary)? General Surgery   Week 1 attempt successful? No   Unsuccessful attempts Attempt 1  [Daughter--Sheila Watson answered and asked for a call back in an hour or so.]            Anabell RUIZ - Registered Nurse

## 2024-12-19 ENCOUNTER — TRANSCRIBE ORDERS (OUTPATIENT)
Dept: ADMINISTRATIVE | Facility: HOSPITAL | Age: 59
End: 2024-12-19
Payer: COMMERCIAL

## 2024-12-19 DIAGNOSIS — I70.238 ATHSCL NATV ART OF RIGHT LEG W ULCER OTH PRT LOWER RIGHT LEG: Primary | ICD-10-CM

## 2024-12-19 DIAGNOSIS — I70.248 ATHEROSCLEROSIS OF NATIVE ARTERY OF BOTH LOWER EXTREMITIES WITH BILATERAL ULCERATION OF OTHER PART OF LOWER LEGS: ICD-10-CM

## 2024-12-19 DIAGNOSIS — I70.213 ATHEROSCLEROSIS OF NATIVE ARTERIES OF EXTREMITIES WITH INTERMITTENT CLAUDICATION, BILATERAL LEGS: ICD-10-CM

## 2024-12-19 DIAGNOSIS — I70.238 ATHEROSCLEROSIS OF NATIVE ARTERY OF BOTH LOWER EXTREMITIES WITH BILATERAL ULCERATION OF OTHER PART OF LOWER LEGS: ICD-10-CM

## 2024-12-20 ENCOUNTER — READMISSION MANAGEMENT (OUTPATIENT)
Dept: CALL CENTER | Facility: HOSPITAL | Age: 59
End: 2024-12-20
Payer: COMMERCIAL

## 2024-12-20 NOTE — OUTREACH NOTE
General Surgery Week 1 Survey      Flowsheet Row Responses   [facility] patient discharged from? Go   Does the patient have one of the following disease processes/diagnoses(primary or secondary)? General Surgery   Week 1 attempt successful? No   Unsuccessful attempts Attempt 2  [Dtr requests call back, second call back request.]            Sara NEGRON - Registered Nurse

## 2024-12-21 LAB
QT INTERVAL: 315 MS
QT INTERVAL: 433 MS
QTC INTERVAL: 449 MS
QTC INTERVAL: 515 MS

## 2024-12-23 ENCOUNTER — OFFICE VISIT (OUTPATIENT)
Dept: RADIATION ONCOLOGY | Facility: HOSPITAL | Age: 59
End: 2024-12-23
Payer: COMMERCIAL

## 2024-12-23 VITALS
WEIGHT: 135.03 LBS | SYSTOLIC BLOOD PRESSURE: 105 MMHG | HEART RATE: 68 BPM | DIASTOLIC BLOOD PRESSURE: 74 MMHG | TEMPERATURE: 98.2 F | RESPIRATION RATE: 22 BRPM | BODY MASS INDEX: 21.79 KG/M2 | OXYGEN SATURATION: 94 %

## 2024-12-23 DIAGNOSIS — C32.1 MALIGNANT NEOPLASM OF SUPRAGLOTTIS: Primary | ICD-10-CM

## 2024-12-23 PROCEDURE — G0463 HOSPITAL OUTPT CLINIC VISIT: HCPCS | Performed by: RADIOLOGY

## 2024-12-23 RX ORDER — PREDNISONE 20 MG/1
40 TABLET ORAL 2 TIMES DAILY
Qty: 120 TABLET | Refills: 1 | Status: SHIPPED | OUTPATIENT
Start: 2024-12-23

## 2024-12-23 NOTE — PROGRESS NOTES
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  CT scan of the soft tissue neck revealed laryngeal edema.  Ms. Llanes has difficulty swallowing and has been found to aspirate essentially foods of all consistencies.  She continues to smoke.    Review of Systems: Review of Systems   Constitutional:  Positive for appetite change (has feeding tube but not using it.) and fatigue (8/10). Negative for unexpected weight change.        Pt refused to get weight. States she was just weighed yesterday     HENT:  Positive for sore throat, trouble swallowing (Ongoing) and voice change (hoarseness, whisper). Negative for congestion, rhinorrhea, sinus pressure, sinus pain and tinnitus.         States feels like her ears are dry and itchy.  C/o thick secretions/saliva  Altered taste/smell  States feels like her throat and neck are swollen.     Eyes:  Positive for visual disturbance (Occasional blurred).   Respiratory:  Positive for cough (occasional after eating), choking (Noted with dry foods) and shortness of breath (occasional, ongoing). Negative for chest tightness.         States that sometimes she has difficulty catching her breath, states feels like it goes along with her throat.    States her breathing has gotten worse.  94% on RA with today's visit. States she has PRN O2 at home, not with her for visit.  O2 offered.      Cardiovascular:  Leg swelling: bilateral, states from venous stasis.   Gastrointestinal:  Positive for constipation (Educated on stool softeners/fiber.  Last bm last night.), diarrhea (Occasional, takes imodium prn) and nausea (Frequent). Negative for abdominal pain, blood in stool  and vomiting.        States that she feels like she has fluid in her abdomen.  States she feels like her abdomen will gurgle from where the tube was removed.     Genitourinary:  Positive for difficulty urinating (ongoing). Negative for dysuria, frequency, hematuria and urgency.   Musculoskeletal:  Positive for arthralgias (Right hip and generalized pain), back pain, gait problem (States she has to bear crawl. Uses WC.) and neck pain (right side). Negative for neck stiffness.   Skin:  Positive for wound (venous stasis on bilateral legs). Negative for color change and rash.        Wound (diabetic Ulcer BLE)   Neurological:  Positive for speech difficulty. Negative for dizziness, weakness, light-headedness and headaches.        Falling asleep while asking questions and in the middle of speaking, Pt leaning over and forward in wc  aroused multiple times to lean back in the wc and pt becomes agitated.       Psychiatric/Behavioral:  Positive for agitation, decreased concentration and sleep disturbance (Due to pain).        The following portions of the patient's history were reviewed and updated as appropriate: allergies, current medications, past family history, past medical history, past social history, past surgical history and problem list.    Medications:     Current Outpatient Medications:     acetaminophen (Tylenol) 325 MG tablet, Take 1 tablet by mouth Every 6 (Six) Hours As Needed for Mild Pain, Headache or Fever., Disp: , Rfl:     albuterol sulfate  (90 Base) MCG/ACT inhaler, Inhale 2 puffs Every 4 (Four) Hours As Needed for Wheezing., Disp: 18 g, Rfl: 2    ALPRAZolam (XANAX) 0.25 MG tablet, Take 2 tablets by mouth 2 (Two) Times a Day As Needed., Disp: , Rfl:     pain patient supplied pump, by Intrathecal route Continuous. Type of Medication: Hydromorphone 15 mg/ml and Bupivacaine 0.5 mg/ml Pentech [phone] Provider:Dr. Boudreaux Provider number: [phone] Basal Dose: Hydromorphone 7.518 mg/day  Bupivacaine 0.54687 mg/day Pump capacity: 40ml ( MAX of Hydromorphone 9.456 mg/ day; Bupivacaine 0.00894 mg /day) Bolus Dose:Hydromorphone 0.500 mg, Bupivacaine 0.16831 mg Max activations: 4 per day Lockout 3 hours. 1 Bolus per every 3 hours  Next refill-  01/07/2025 Alarm date- 01/12/2025 Pump manufacture:[employer], Disp: , Rfl:     cetirizine (zyrTEC) 10 MG tablet, Take 1 tablet by mouth Daily. (Patient not taking: Reported on 12/23/2024), Disp: , Rfl:     clopidogrel (PLAVIX) 75 MG tablet, Take 1 tablet by mouth Daily. (Patient not taking: Reported on 12/23/2024), Disp: 90 tablet, Rfl: 2    mupirocin (BACTROBAN) 2 % ointment, Apply 1 Application topically to the appropriate area as directed 3 (Three) Times a Day., Disp: 30 g, Rfl: 2    naloxone (NARCAN) 4 MG/0.1ML nasal spray, Call 911. Don't prime. [place] in 1 nostril for overdose. Repeat in 2-3 minutes in other nostril if no or minimal breathing/responsiveness., Disp: 2 each, Rfl: 0    nicotine (NICODERM CQ) 21 MG/24HR patch, Place 1 patch on the skin as directed by provider Daily for 7 days., Disp: 14 patch, Rfl: 0    predniSONE (DELTASONE) 20 MG tablet, Take 2 tablets by mouth 2 (Two) Times a Day., Disp: 120 tablet, Rfl: 1    temazepam (RESTORIL) 15 MG capsule, Take 1 capsule by mouth Every Night for 14 days. (Patient not taking: Reported on 12/23/2024), Disp: 14 capsule, Rfl: 0    Allergies:   Allergies   Allergen Reactions    Amoxicillin Shortness Of Breath     Has tolerated Cefazolin, Ceftriaxone, and Cefepime -Jermaine Montez, [facility]    Ceclor [Cefaclor] Shortness Of Breath     Has tolerated Cefazolin, Ceftriaxone, and Cefepime -Jermaine Montez, [facility]      Penicillins Shortness Of Breath     Has tolerated Cefazolin, Ceftriaxone, and Cefepime -Jermaine Montez, [facility]    Sulfa Antibiotics Rash    Valium [Diazepam] Mental Status Change     DEPRESSED    Ambien [Zolpidem] Mental Status Change    Aspirin GI Intolerance    Ativan [Lorazepam] Mental Status Change     Benadryl [Diphenhydramine] Anxiety     AND MAKES HER HYPER    Biaxin [Clarithromycin] Unknown - Low Severity    Cephalexin Unknown - Low Severity    Clindamycin Unknown - Low Severity    Compazine [Prochlorperazine Edisylate] Rash    Contrast Dye (Echo Or Unknown Ct/Mr) Other (See Comments)     Caused pain in her arm    Doxycycline Rash    Nsaids GI Intolerance    Phenergan [Promethazine Hcl] GI Intolerance    Promethazine GI Intolerance    Vancomycin Itching       ECOG: (2) Ambulatory and capable of self care, unable to carry out work activity, up and about > 50% or waking hours  Quality of Life: 40 - Must Use Wheelchair Most of Time     Objective     Physical Exam:   Vital Signs:   Vitals:    12/23/24 0750   BP: 105/74   Pulse: 68   Resp: 22   Temp: 98.2 °F (36.8 °C)   TempSrc: Temporal   SpO2: 94%   Weight: 61.3 kg (135 lb 0.5 oz)   PainSc: 7  Comment: ongoing, has pain meds   PainLoc: Generalized     Body mass index is 21.79 kg/m².     Physical Exam  Constitutional:       General: She is not in acute distress.     Appearance: Normal appearance. She is not toxic-appearing.   HENT:      Head: Normocephalic and atraumatic.   Pulmonary:      Effort: Pulmonary effort is normal. No respiratory distress.   Neurological:      General: No focal deficit present.      Mental Status: She is alert and oriented to person, place, and time.      Cranial Nerves: No cranial nerve deficit.   Psychiatric:         Mood and Affect: Mood normal.         Behavior: Behavior normal.         Judgment: Judgment normal.       Nasopharyngoscopy Note    12/23/2024          Chief Complaint   Patient presents with    Follow-up           Rationale/Reason for Procedure: History of laryngeal cancer; laryngeal edema    Procedure: After verbal consent was obtained, the flexible laryngoscope was passed through the right naris. The right nasal cavity and nasopharynx were within normal limits. The right torus tubarius was visualized without any  lesions.  There was diffuse edema throughout the supraglottic larynx including the epiglottis AE folds and arytenoids.  Review of the true vocal cords was obscured.  The scope was then removed.  Ms. Llanes tolerated the procedure well.             Goyo Nunez MD / 12/23/2024 / 09:19 EST  Radiation Oncologist Signature / Date / Time           Radiographs: CT Abdomen Pelvis Without Contrast    Result Date: 12/10/2024  1.Inflammation of the ascending colon consistent with colitis. The PEG tube appears appropriately placed. 2.0.8 cm right lower lobe pulmonary nodule. This is new as compared to the prior study. 3-month follow-up recommended. 3.Additional chronic findings as described above. Electronically Signed: Sony Escobedo MD  12/10/2024 7:11 PM EST  Workstation ID: [id number]    IR J or G Tube Contrast Eval    Result Date: 12/10/2024  Impression: Gastrostomy tube in satisfactory position. Electronically Signed: Odilon Sheriff MD  12/10/2024 7:00 PM EST  Workstation ID: [id number]    XR Chest 1 View    Result Date: 12/10/2024  Impression: Mild right basilar opacity, which could reflect atelectasis or pneumonia. Electronically Signed: Ryan Farnsworth MD  12/10/2024 6:33 PM EST  Workstation ID: [id number]    FL Video Swallow With Speech Single Contrast    Result Date: 12/10/2024  Impression: Tracheal aspiration of nectar and honey thick liquid barium and applesauce with barium Please consult speech pathology report for further details regarding the exam and for dietary recommendations. Report dictated by: Liz Pisano  I have personally reviewed this case and agree with the findings above: Electronically Signed: Manuelito Almanzar MD  12/10/2024 9:08 AM EST  Workstation ID: [id number]    CT Angiogram Chest Pulmonary Embolism    Result Date: 12/7/2024  1.No evidence of pulmonary embolus. 2. Pathologically enlarged hilar lymph node on the right. Could be reactive or neoplastic. 3.Moderate emphysema. 4.Narrowing of the  airway at the level of the larynx with soft tissue edema and thickening at this location similar to previous CT chest. Question posttreatment changes or active malignancy. Correlate for signs or symptoms of upper airway obstruction. 5.New 7 mm pulmonary nodule right lower lobe. Follow-up CT chest in 3 months time is recommended. Electronically Signed: Mara Pritchard MD  12/7/2024 9:38 AM EST  Workstation ID: [id number]    XR Chest 1 View    Result Date: 12/7/2024  Impression: 1.Pulmonary vascular congestion, which could reflect mild pulmonary edema. 2.No focal pneumonia identified. Electronically Signed: Ryan Farnsworth MD  12/7/2024 7:34 AM EST  Workstation ID: [id number]    CT Soft Tissue Neck Without Contrast    Result Date: 11/7/2024  New or greater prominence of the posterior pharyngeal wall is noted in the hypopharyngeal and supraglottic regions since 9/10/2024 with associated narrowing of the airway. The findings may be related to treatment effect. Residual or recurrent tumor is possible. New tumor cannot be excluded. Consider direct mucosal visualization for further assessment. There are also new or more prominent bilateral cervical lymph nodes. The study is limited by motion artifact. Please see the above comments for further detail.   Portions of this note were completed with a voice recognition program.  Electronically Signed By-Sony Hood MD On:11/7/2024 1:25 AM      I personally reviewed the CT scan of the soft tissue neck from 11/7/2024.  The pertinent findings are as above.        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  She has involvement of the laryngeal side of the epiglottis.  She is status post external beam radiotherapy, having received 70 [place] in 35 daily fractions over the course of 66 elapsed days.  PET/CT in September 2024 revealed no evidence of  recurrent or metastatic disease, however, she has experienced progression of laryngeal edema and resultant airway narrowing.    I discussed the findings of today's nasopharyngoscopy and explained that there has been progression of supraglottic edema without a discrete mass identified.  I explained that she could most certainly have presence of tumor recurrence that is not overtly apparent via today's exam.  I did recommend evaluation by an oncologic otolaryngologist and have made a referral to Dr. Grayson Cline of [facility].  I explained that regardless of the presence of malignancy, total laryngectomy may be warranted if she has no serviceable voice or ability to swallow.  I increased her steroid dose to prednisone 40 mg p.o. twice daily to be continued for no fewer than 30 days.  I will see her in follow-up in 6 weeks with repeat nasopharyngoscopy at that time.      Goyo Nunez MD  Radiation Oncology  [facility]    This document has been signed by Goyo Nunez MD on December 23, 2024 09:19 EST

## 2024-12-26 ENCOUNTER — DOCUMENTATION (OUTPATIENT)
Dept: RADIATION ONCOLOGY | Facility: HOSPITAL | Age: 59
End: 2024-12-26
Payer: COMMERCIAL

## 2024-12-26 NOTE — PROGRESS NOTES
Outpatient Nutrition Oncology Documentation    Patient Name: Isha Llanes  YOB: 1965  MRN: [id number]      Reason for Consult:  Nutrition Referral (reduced oral intake, pt has a feeding tube)      Consult or Intervention:  Nutrition referral received.  Pt seen for f/u in radiation oncology.  Recent hospitalization due to Covid-19, AMS, hypoxemia.  S/P PEG placement 12/10/24 while inpatient.  MBSS performed 12/9/24 results:  oropharyngeal dysphagia characterized by silent aspiration of all consistencies.  Speech pathology recommended pt be NPO and to rely on alternate feeding method therefore a feeding tube was placed.  Per Oncologist's documentation from 12/23/24, pt refuses to use her feeding tube despite medical advice to do so.  Pt was set up with a vendor ([employer]) to provide formula and tube supplies upon discharge 12/12/24.  No further intervention at this time.    Oncology RD remains available per protocol.

## 2024-12-29 LAB
QT INTERVAL: 408 MS
QTC INTERVAL: 502 MS

## 2025-01-01 ENCOUNTER — APPOINTMENT (OUTPATIENT)
Dept: GENERAL RADIOLOGY | Facility: HOSPITAL | Age: 60
End: 2025-01-01
Payer: MEDICAID

## 2025-01-01 ENCOUNTER — APPOINTMENT (OUTPATIENT)
Dept: CARDIOLOGY | Facility: HOSPITAL | Age: 60
End: 2025-01-01
Payer: MEDICAID

## 2025-01-01 ENCOUNTER — HOSPITAL ENCOUNTER (INPATIENT)
Facility: HOSPITAL | Age: 60
LOS: 5 days | End: 2025-08-25
Attending: EMERGENCY MEDICINE | Admitting: INTERNAL MEDICINE
Payer: MEDICAID

## 2025-01-01 VITALS
OXYGEN SATURATION: 78 % | DIASTOLIC BLOOD PRESSURE: 24 MMHG | SYSTOLIC BLOOD PRESSURE: 34 MMHG | TEMPERATURE: 99.6 F | HEIGHT: 66 IN | BODY MASS INDEX: 20.8 KG/M2 | RESPIRATION RATE: 28 BRPM | WEIGHT: 129.41 LBS

## 2025-01-01 DIAGNOSIS — A41.9 SEPSIS, DUE TO UNSPECIFIED ORGANISM, UNSPECIFIED WHETHER ACUTE ORGAN DYSFUNCTION PRESENT: Primary | ICD-10-CM

## 2025-01-01 DIAGNOSIS — I31.4 CARDIAC TAMPONADE: ICD-10-CM

## 2025-01-01 LAB
ALBUMIN SERPL-MCNC: 1.6 G/DL (ref 3.5–5.2)
ALBUMIN SERPL-MCNC: 1.6 G/DL (ref 3.5–5.2)
ALBUMIN SERPL-MCNC: 1.7 G/DL (ref 3.5–5.2)
ALBUMIN SERPL-MCNC: 2 G/DL (ref 3.5–5.2)
ALBUMIN SERPL-MCNC: 2 G/DL (ref 3.5–5.2)
ALBUMIN SERPL-MCNC: 2.1 G/DL (ref 3.5–5.2)
ALBUMIN/GLOB SERPL: 0.4 G/DL
ALBUMIN/GLOB SERPL: 0.5 G/DL
ALBUMIN/GLOB SERPL: 0.5 G/DL
ALBUMIN/GLOB SERPL: 0.6 G/DL
ALBUMIN/GLOB SERPL: 0.7 G/DL
ALBUMIN/GLOB SERPL: 0.8 G/DL
ALP SERPL-CCNC: 127 U/L (ref 39–117)
ALP SERPL-CCNC: 129 U/L (ref 39–117)
ALP SERPL-CCNC: 134 U/L (ref 39–117)
ALP SERPL-CCNC: 149 U/L (ref 39–117)
ALP SERPL-CCNC: 158 U/L (ref 39–117)
ALP SERPL-CCNC: 268 U/L (ref 39–117)
ALT SERPL W P-5'-P-CCNC: 293 U/L (ref 1–33)
ALT SERPL W P-5'-P-CCNC: 37 U/L (ref 1–33)
ALT SERPL W P-5'-P-CCNC: 5 U/L (ref 1–33)
ALT SERPL W P-5'-P-CCNC: 5 U/L (ref 1–33)
ALT SERPL W P-5'-P-CCNC: 6 U/L (ref 1–33)
ALT SERPL W P-5'-P-CCNC: 7 U/L (ref 1–33)
ANION GAP SERPL CALCULATED.3IONS-SCNC: 10 MMOL/L (ref 5–15)
ANION GAP SERPL CALCULATED.3IONS-SCNC: 10.5 MMOL/L (ref 5–15)
ANION GAP SERPL CALCULATED.3IONS-SCNC: 10.8 MMOL/L (ref 5–15)
ANION GAP SERPL CALCULATED.3IONS-SCNC: 11.1 MMOL/L (ref 5–15)
ANION GAP SERPL CALCULATED.3IONS-SCNC: 12.2 MMOL/L (ref 5–15)
ANION GAP SERPL CALCULATED.3IONS-SCNC: 19 MMOL/L (ref 5–15)
ANION GAP SERPL CALCULATED.3IONS-SCNC: 22.7 MMOL/L (ref 5–15)
AORTIC DIMENSIONLESS INDEX: 0.42 (DI)
APPEARANCE FLD: ABNORMAL
ARTERIAL PATENCY WRIST A: ABNORMAL
ARTERIAL PATENCY WRIST A: ABNORMAL
ARTERIAL PATENCY WRIST A: POSITIVE
AST SERPL-CCNC: 10 U/L (ref 1–32)
AST SERPL-CCNC: 18 U/L (ref 1–32)
AST SERPL-CCNC: 2324 U/L (ref 1–32)
AST SERPL-CCNC: 287 U/L (ref 1–32)
AST SERPL-CCNC: 8 U/L (ref 1–32)
AST SERPL-CCNC: 9 U/L (ref 1–32)
ATMOSPHERIC PRESS: 741.1 MMHG
ATMOSPHERIC PRESS: 743.7 MMHG
ATMOSPHERIC PRESS: 744.2 MMHG
ATMOSPHERIC PRESS: 744.2 MMHG
ATMOSPHERIC PRESS: 744.3 MMHG
AV MEAN PRESS GRAD SYS DOP V1V2: 16 MMHG
AV VMAX SYS DOP: 260 CM/SEC
B PARAPERT DNA SPEC QL NAA+PROBE: NOT DETECTED
B PERT DNA SPEC QL NAA+PROBE: NOT DETECTED
BACTERIA SPEC AEROBE CULT: ABNORMAL
BACTERIA SPEC AEROBE CULT: ABNORMAL
BACTERIA SPEC AEROBE CULT: NORMAL
BACTERIA SPEC AEROBE CULT: NORMAL
BACTERIA SPEC RESP CULT: NORMAL
BACTERIA UR QL AUTO: ABNORMAL /HPF
BACTERIA UR QL AUTO: ABNORMAL /HPF
BASE EXCESS BLDA CALC-SCNC: -14.8 MMOL/L (ref -2–2)
BASE EXCESS BLDA CALC-SCNC: -4.4 MMOL/L (ref -2–2)
BASE EXCESS BLDA CALC-SCNC: -8 MMOL/L (ref -2–2)
BASE EXCESS BLDA CALC-SCNC: 1.4 MMOL/L (ref -2–2)
BASE EXCESS BLDA CALC-SCNC: 4.8 MMOL/L (ref -2–2)
BASOPHILS # BLD AUTO: 0.01 10*3/MM3 (ref 0–0.2)
BASOPHILS # BLD AUTO: 0.04 10*3/MM3 (ref 0–0.2)
BASOPHILS NFR BLD AUTO: 0.1 % (ref 0–1.5)
BASOPHILS NFR BLD AUTO: 0.1 % (ref 0–1.5)
BASOPHILS NFR BLD AUTO: 0.2 % (ref 0–1.5)
BASOPHILS NFR BLD AUTO: 0.3 % (ref 0–1.5)
BDY SITE: ABNORMAL
BH CV ECHO MEAS - AO MAX PG: 27 MMHG
BH CV ECHO MEAS - AO ROOT DIAM: 2.3 CM
BH CV ECHO MEAS - AO V2 VTI: 47.6 CM
BH CV ECHO MEAS - AVA(I,D): 1.33 CM2
BH CV ECHO MEAS - EDV(CUBED): 91.1 ML
BH CV ECHO MEAS - EDV(MOD-SP2): 61.3 ML
BH CV ECHO MEAS - EDV(MOD-SP4): 68.4 ML
BH CV ECHO MEAS - EF(MOD-SP2): 27.6 %
BH CV ECHO MEAS - EF(MOD-SP4): 34.4 %
BH CV ECHO MEAS - ESV(CUBED): 59.3 ML
BH CV ECHO MEAS - ESV(MOD-SP2): 44.4 ML
BH CV ECHO MEAS - ESV(MOD-SP4): 44.9 ML
BH CV ECHO MEAS - FS: 13.3 %
BH CV ECHO MEAS - IVS/LVPW: 1 CM
BH CV ECHO MEAS - IVSD: 1.2 CM
BH CV ECHO MEAS - LA DIMENSION: 3.9 CM
BH CV ECHO MEAS - LAT PEAK E' VEL: 5 CM/SEC
BH CV ECHO MEAS - LV DIASTOLIC VOL/BSA (35-75): 43.9 CM2
BH CV ECHO MEAS - LV MASS(C)D: 198.1 GRAMS
BH CV ECHO MEAS - LV MAX PG: 6.4 MMHG
BH CV ECHO MEAS - LV MEAN PG: 3 MMHG
BH CV ECHO MEAS - LV SYSTOLIC VOL/BSA (12-30): 28.8 CM2
BH CV ECHO MEAS - LV V1 MAX: 126 CM/SEC
BH CV ECHO MEAS - LV V1 VTI: 20.1 CM
BH CV ECHO MEAS - LVIDD: 4.5 CM
BH CV ECHO MEAS - LVIDS: 3.9 CM
BH CV ECHO MEAS - LVOT AREA: 3.1 CM2
BH CV ECHO MEAS - LVOT DIAM: 2 CM
BH CV ECHO MEAS - LVPWD: 1.2 CM
BH CV ECHO MEAS - MED PEAK E' VEL: 6.9 CM/SEC
BH CV ECHO MEAS - MR MAX PG: 113.2 MMHG
BH CV ECHO MEAS - MR MAX VEL: 532 CM/SEC
BH CV ECHO MEAS - MR MEAN PG: 74 MMHG
BH CV ECHO MEAS - MR MEAN VEL: 409 CM/SEC
BH CV ECHO MEAS - MR VTI: 154 CM
BH CV ECHO MEAS - MV A MAX VEL: 238 CM/SEC
BH CV ECHO MEAS - MV DEC SLOPE: 1414 CM/SEC2
BH CV ECHO MEAS - MV DEC TIME: 0.12 SEC
BH CV ECHO MEAS - MV E MAX VEL: 173 CM/SEC
BH CV ECHO MEAS - MV E/A: 0.73
BH CV ECHO MEAS - MV MEAN PG: 10 MMHG
BH CV ECHO MEAS - MV V2 VTI: 36.2 CM
BH CV ECHO MEAS - MVA(VTI): 1.74 CM2
BH CV ECHO MEAS - RVDD: 2 CM
BH CV ECHO MEAS - SV(LVOT): 63.1 ML
BH CV ECHO MEAS - SV(MOD-SP2): 16.9 ML
BH CV ECHO MEAS - SV(MOD-SP4): 23.5 ML
BH CV ECHO MEAS - SVI(LVOT): 40.5 ML/M2
BH CV ECHO MEAS - SVI(MOD-SP2): 10.9 ML/M2
BH CV ECHO MEAS - SVI(MOD-SP4): 15.1 ML/M2
BH CV ECHO MEASUREMENTS AVERAGE E/E' RATIO: 29.08
BILIRUB SERPL-MCNC: 0.3 MG/DL (ref 0–1.2)
BILIRUB SERPL-MCNC: 0.5 MG/DL (ref 0–1.2)
BILIRUB SERPL-MCNC: 0.5 MG/DL (ref 0–1.2)
BILIRUB SERPL-MCNC: 0.6 MG/DL (ref 0–1.2)
BILIRUB SERPL-MCNC: 0.9 MG/DL (ref 0–1.2)
BILIRUB SERPL-MCNC: 1.6 MG/DL (ref 0–1.2)
BILIRUB UR QL STRIP: NEGATIVE
BILIRUB UR QL STRIP: NEGATIVE
BUN SERPL-MCNC: 10.2 MG/DL (ref 6–20)
BUN SERPL-MCNC: 4 MG/DL (ref 6–20)
BUN SERPL-MCNC: 4.7 MG/DL (ref 6–20)
BUN SERPL-MCNC: 4.8 MG/DL (ref 6–20)
BUN SERPL-MCNC: 6.2 MG/DL (ref 6–20)
BUN SERPL-MCNC: 7 MG/DL (ref 6–20)
BUN SERPL-MCNC: 7.3 MG/DL (ref 6–20)
BUN/CREAT SERPL: 11.2 (ref 7–25)
BUN/CREAT SERPL: 16.2 (ref 7–25)
BUN/CREAT SERPL: 17.4 (ref 7–25)
BUN/CREAT SERPL: 23.5 (ref 7–25)
BUN/CREAT SERPL: 24 (ref 7–25)
BUN/CREAT SERPL: 25.9 (ref 7–25)
BUN/CREAT SERPL: 28.2 (ref 7–25)
C PNEUM DNA NPH QL NAA+NON-PROBE: NOT DETECTED
CA-I BLDA-SCNC: 1.01 MMOL/L (ref 1.13–1.32)
CA-I BLDA-SCNC: 1.03 MMOL/L (ref 1.13–1.32)
CA-I BLDA-SCNC: 1.06 MMOL/L (ref 1.13–1.32)
CA-I BLDA-SCNC: 1.17 MMOL/L (ref 1.13–1.32)
CALCIUM SPEC-SCNC: 7.3 MG/DL (ref 8.6–10.5)
CALCIUM SPEC-SCNC: 7.3 MG/DL (ref 8.6–10.5)
CALCIUM SPEC-SCNC: 7.5 MG/DL (ref 8.6–10.5)
CALCIUM SPEC-SCNC: 7.5 MG/DL (ref 8.6–10.5)
CALCIUM SPEC-SCNC: 7.6 MG/DL (ref 8.6–10.5)
CALCIUM SPEC-SCNC: 7.7 MG/DL (ref 8.6–10.5)
CALCIUM SPEC-SCNC: 8 MG/DL (ref 8.6–10.5)
CHLORIDE BLDA-SCNC: 100 MMOL/L (ref 98–107)
CHLORIDE BLDA-SCNC: 106 MMOL/L (ref 98–107)
CHLORIDE BLDA-SCNC: 99 MMOL/L (ref 98–107)
CHLORIDE BLDA-SCNC: 99 MMOL/L (ref 98–107)
CHLORIDE SERPL-SCNC: 100 MMOL/L (ref 98–107)
CHLORIDE SERPL-SCNC: 94 MMOL/L (ref 98–107)
CHLORIDE SERPL-SCNC: 97 MMOL/L (ref 98–107)
CHLORIDE SERPL-SCNC: 98 MMOL/L (ref 98–107)
CHLORIDE SERPL-SCNC: 99 MMOL/L (ref 98–107)
CLARITY UR: ABNORMAL
CLARITY UR: ABNORMAL
CO2 SERPL-SCNC: 12.3 MMOL/L (ref 22–29)
CO2 SERPL-SCNC: 19 MMOL/L (ref 22–29)
CO2 SERPL-SCNC: 19.8 MMOL/L (ref 22–29)
CO2 SERPL-SCNC: 19.9 MMOL/L (ref 22–29)
CO2 SERPL-SCNC: 24.5 MMOL/L (ref 22–29)
CO2 SERPL-SCNC: 26 MMOL/L (ref 22–29)
CO2 SERPL-SCNC: 26.2 MMOL/L (ref 22–29)
COLOR FLD: ABNORMAL
COLOR UR: ABNORMAL
COLOR UR: YELLOW
CREAT SERPL-MCNC: 0.2 MG/DL (ref 0.57–1)
CREAT SERPL-MCNC: 0.2 MG/DL (ref 0.57–1)
CREAT SERPL-MCNC: 0.22 MG/DL (ref 0.57–1)
CREAT SERPL-MCNC: 0.23 MG/DL (ref 0.57–1)
CREAT SERPL-MCNC: 0.27 MG/DL (ref 0.57–1)
CREAT SERPL-MCNC: 0.45 MG/DL (ref 0.57–1)
CREAT SERPL-MCNC: 0.91 MG/DL (ref 0.57–1)
D-LACTATE SERPL-SCNC: 1 MMOL/L
D-LACTATE SERPL-SCNC: 1.1 MMOL/L (ref 0.5–2)
D-LACTATE SERPL-SCNC: 1.5 MMOL/L (ref 0.5–2)
D-LACTATE SERPL-SCNC: 11.8 MMOL/L
D-LACTATE SERPL-SCNC: 12 MMOL/L (ref 0.5–2)
D-LACTATE SERPL-SCNC: 12.1 MMOL/L (ref 0.5–2)
D-LACTATE SERPL-SCNC: 3 MMOL/L
D-LACTATE SERPL-SCNC: 3.3 MMOL/L (ref 0.5–2)
D-LACTATE SERPL-SCNC: 8.4 MMOL/L (ref 0.5–2)
D-LACTATE SERPL-SCNC: 8.6 MMOL/L
D-LACTATE SERPL-SCNC: 9.1 MMOL/L (ref 0.5–2)
DEPRECATED RDW RBC AUTO: 48.1 FL (ref 37–54)
DEPRECATED RDW RBC AUTO: 49.1 FL (ref 37–54)
DEPRECATED RDW RBC AUTO: 50.7 FL (ref 37–54)
DEPRECATED RDW RBC AUTO: 51.8 FL (ref 37–54)
DEPRECATED RDW RBC AUTO: 52.5 FL (ref 37–54)
DEPRECATED RDW RBC AUTO: 52.8 FL (ref 37–54)
DEPRECATED RDW RBC AUTO: 61.6 FL (ref 37–54)
EGFRCR SERPLBLD CKD-EPI 2021: 111 ML/MIN/1.73
EGFRCR SERPLBLD CKD-EPI 2021: 125.5 ML/MIN/1.73
EGFRCR SERPLBLD CKD-EPI 2021: 130.5 ML/MIN/1.73
EGFRCR SERPLBLD CKD-EPI 2021: 131.9 ML/MIN/1.73
EGFRCR SERPLBLD CKD-EPI 2021: 134.9 ML/MIN/1.73
EGFRCR SERPLBLD CKD-EPI 2021: 134.9 ML/MIN/1.73
EGFRCR SERPLBLD CKD-EPI 2021: 72.8 ML/MIN/1.73
EOSINOPHIL # BLD AUTO: 0 10*3/MM3 (ref 0–0.4)
EOSINOPHIL # BLD AUTO: 0 10*3/MM3 (ref 0–0.4)
EOSINOPHIL # BLD AUTO: 0.01 10*3/MM3 (ref 0–0.4)
EOSINOPHIL # BLD AUTO: 0.02 10*3/MM3 (ref 0–0.4)
EOSINOPHIL NFR BLD AUTO: 0 % (ref 0.3–6.2)
EOSINOPHIL NFR BLD AUTO: 0 % (ref 0.3–6.2)
EOSINOPHIL NFR BLD AUTO: 0.1 % (ref 0.3–6.2)
EOSINOPHIL NFR BLD AUTO: 0.4 % (ref 0.3–6.2)
ERYTHROCYTE [DISTWIDTH] IN BLOOD BY AUTOMATED COUNT: 17.7 % (ref 12.3–15.4)
ERYTHROCYTE [DISTWIDTH] IN BLOOD BY AUTOMATED COUNT: 17.9 % (ref 12.3–15.4)
ERYTHROCYTE [DISTWIDTH] IN BLOOD BY AUTOMATED COUNT: 18.1 % (ref 12.3–15.4)
ERYTHROCYTE [DISTWIDTH] IN BLOOD BY AUTOMATED COUNT: 18.1 % (ref 12.3–15.4)
ERYTHROCYTE [DISTWIDTH] IN BLOOD BY AUTOMATED COUNT: 18.2 % (ref 12.3–15.4)
ERYTHROCYTE [DISTWIDTH] IN BLOOD BY AUTOMATED COUNT: 18.5 % (ref 12.3–15.4)
ERYTHROCYTE [DISTWIDTH] IN BLOOD BY AUTOMATED COUNT: 19 % (ref 12.3–15.4)
FLUAV SUBTYP SPEC NAA+PROBE: NOT DETECTED
FLUBV RNA NPH QL NAA+NON-PROBE: NOT DETECTED
GAS FLOW AIRWAY: 2 LPM
GEN 5 1HR TROPONIN T REFLEX: 50 NG/L
GLOBULIN UR ELPH-MCNC: 2.6 GM/DL
GLOBULIN UR ELPH-MCNC: 2.8 GM/DL
GLOBULIN UR ELPH-MCNC: 3.4 GM/DL
GLOBULIN UR ELPH-MCNC: 3.6 GM/DL
GLOBULIN UR ELPH-MCNC: 3.6 GM/DL
GLOBULIN UR ELPH-MCNC: 3.7 GM/DL
GLUCOSE BLDC GLUCOMTR-MCNC: 103 MG/DL (ref 70–99)
GLUCOSE BLDC GLUCOMTR-MCNC: 106 MG/DL (ref 65–99)
GLUCOSE BLDC GLUCOMTR-MCNC: 123 MG/DL (ref 65–99)
GLUCOSE BLDC GLUCOMTR-MCNC: 128 MG/DL (ref 70–99)
GLUCOSE BLDC GLUCOMTR-MCNC: 139 MG/DL (ref 70–99)
GLUCOSE BLDC GLUCOMTR-MCNC: 143 MG/DL (ref 65–99)
GLUCOSE BLDC GLUCOMTR-MCNC: 256 MG/DL (ref 70–99)
GLUCOSE BLDC GLUCOMTR-MCNC: 264 MG/DL (ref 70–99)
GLUCOSE BLDC GLUCOMTR-MCNC: 268 MG/DL (ref 65–99)
GLUCOSE BLDC GLUCOMTR-MCNC: 56 MG/DL (ref 70–99)
GLUCOSE BLDC GLUCOMTR-MCNC: 76 MG/DL (ref 70–99)
GLUCOSE BLDC GLUCOMTR-MCNC: 89 MG/DL (ref 70–99)
GLUCOSE SERPL-MCNC: 142 MG/DL (ref 65–99)
GLUCOSE SERPL-MCNC: 150 MG/DL (ref 65–99)
GLUCOSE SERPL-MCNC: 156 MG/DL (ref 65–99)
GLUCOSE SERPL-MCNC: 182 MG/DL (ref 65–99)
GLUCOSE SERPL-MCNC: 25 MG/DL (ref 65–99)
GLUCOSE SERPL-MCNC: 95 MG/DL (ref 65–99)
GLUCOSE SERPL-MCNC: 97 MG/DL (ref 65–99)
GLUCOSE UR STRIP-MCNC: NEGATIVE MG/DL
GLUCOSE UR STRIP-MCNC: NEGATIVE MG/DL
GRAM STN SPEC: NORMAL
HADV DNA SPEC NAA+PROBE: NOT DETECTED
HCO3 BLDA-SCNC: 13.2 MMOL/L (ref 22–26)
HCO3 BLDA-SCNC: 18.2 MMOL/L (ref 22–26)
HCO3 BLDA-SCNC: 21.3 MMOL/L (ref 22–26)
HCO3 BLDA-SCNC: 26.1 MMOL/L (ref 22–26)
HCO3 BLDA-SCNC: 29.1 MMOL/L (ref 22–26)
HCOV 229E RNA SPEC QL NAA+PROBE: NOT DETECTED
HCOV HKU1 RNA SPEC QL NAA+PROBE: NOT DETECTED
HCOV NL63 RNA SPEC QL NAA+PROBE: NOT DETECTED
HCOV OC43 RNA SPEC QL NAA+PROBE: NOT DETECTED
HCT VFR BLD AUTO: 27.9 % (ref 34–46.6)
HCT VFR BLD AUTO: 30.3 % (ref 34–46.6)
HCT VFR BLD AUTO: 33.1 % (ref 34–46.6)
HCT VFR BLD AUTO: 33.2 % (ref 34–46.6)
HCT VFR BLD AUTO: 33.3 % (ref 34–46.6)
HCT VFR BLD AUTO: 34 % (ref 34–46.6)
HCT VFR BLD AUTO: 35 % (ref 34–46.6)
HCT VFR BLD CALC: 22 % (ref 38–51)
HCT VFR BLD CALC: 31 % (ref 38–51)
HCT VFR BLD CALC: 32 % (ref 38–51)
HCT VFR BLD CALC: 36 % (ref 38–51)
HCT VFR BLD CALC: 37 % (ref 38–51)
HEMODILUTION: NO
HGB BLD-MCNC: 10 G/DL (ref 12–15.9)
HGB BLD-MCNC: 10.6 G/DL (ref 12–15.9)
HGB BLD-MCNC: 10.7 G/DL (ref 12–15.9)
HGB BLD-MCNC: 8.6 G/DL (ref 12–15.9)
HGB BLD-MCNC: 9.1 G/DL (ref 12–15.9)
HGB BLD-MCNC: 9.7 G/DL (ref 12–15.9)
HGB BLD-MCNC: 9.9 G/DL (ref 12–15.9)
HGB BLDA-MCNC: 10.7 G/DL (ref 12–18)
HGB BLDA-MCNC: 10.7 G/DL (ref 12–18)
HGB BLDA-MCNC: 12.1 G/DL (ref 12–18)
HGB BLDA-MCNC: 12.5 G/DL (ref 12–18)
HGB BLDA-MCNC: 7.6 G/DL (ref 12–18)
HGB UR QL STRIP.AUTO: ABNORMAL
HGB UR QL STRIP.AUTO: ABNORMAL
HMPV RNA NPH QL NAA+NON-PROBE: NOT DETECTED
HOLD SPECIMEN: NORMAL
HPIV1 RNA ISLT QL NAA+PROBE: NOT DETECTED
HPIV2 RNA SPEC QL NAA+PROBE: NOT DETECTED
HPIV3 RNA NPH QL NAA+PROBE: NOT DETECTED
HPIV4 P GENE NPH QL NAA+PROBE: NOT DETECTED
HYALINE CASTS UR QL AUTO: ABNORMAL /LPF
HYALINE CASTS UR QL AUTO: ABNORMAL /LPF
IMM GRANULOCYTES # BLD AUTO: 0.03 10*3/MM3 (ref 0–0.05)
IMM GRANULOCYTES # BLD AUTO: 0.04 10*3/MM3 (ref 0–0.05)
IMM GRANULOCYTES # BLD AUTO: 0.08 10*3/MM3 (ref 0–0.05)
IMM GRANULOCYTES # BLD AUTO: 0.17 10*3/MM3 (ref 0–0.05)
IMM GRANULOCYTES NFR BLD AUTO: 0.5 % (ref 0–0.5)
IMM GRANULOCYTES NFR BLD AUTO: 0.6 % (ref 0–0.5)
IMM GRANULOCYTES NFR BLD AUTO: 1 % (ref 0–0.5)
IMM GRANULOCYTES NFR BLD AUTO: 1.4 % (ref 0–0.5)
INHALED O2 CONCENTRATION: 28 %
INHALED O2 CONCENTRATION: 55 %
INHALED O2 CONCENTRATION: 60 %
INHALED O2 CONCENTRATION: 60 %
INHALED O2 CONCENTRATION: 80 %
IVRT: 82 MS
KETONES UR QL STRIP: ABNORMAL
KETONES UR QL STRIP: ABNORMAL
L PNEUMO1 AG UR QL IA: NEGATIVE
LEFT ATRIUM VOLUME INDEX: 42.7 ML/M2
LEUKOCYTE ESTERASE UR QL STRIP.AUTO: ABNORMAL
LEUKOCYTE ESTERASE UR QL STRIP.AUTO: ABNORMAL
LV EF BIPLANE MOD: 35.1 %
LYMPHOCYTES # BLD AUTO: 0.35 10*3/MM3 (ref 0.7–3.1)
LYMPHOCYTES # BLD AUTO: 0.43 10*3/MM3 (ref 0.7–3.1)
LYMPHOCYTES # BLD AUTO: 0.63 10*3/MM3 (ref 0.7–3.1)
LYMPHOCYTES # BLD AUTO: 0.65 10*3/MM3 (ref 0.7–3.1)
LYMPHOCYTES NFR BLD AUTO: 2.8 % (ref 19.6–45.3)
LYMPHOCYTES NFR BLD AUTO: 7.5 % (ref 19.6–45.3)
LYMPHOCYTES NFR BLD AUTO: 8.5 % (ref 19.6–45.3)
LYMPHOCYTES NFR BLD AUTO: 8.8 % (ref 19.6–45.3)
LYMPHOCYTES NFR FLD MANUAL: 16 %
Lab: ABNORMAL
M PNEUMO IGG SER IA-ACNC: NOT DETECTED
M PNEUMO IGM SER QL: NEGATIVE
MAGNESIUM SERPL-MCNC: 1.3 MG/DL (ref 1.6–2.6)
MAGNESIUM SERPL-MCNC: 1.3 MG/DL (ref 1.6–2.6)
MAGNESIUM SERPL-MCNC: 1.5 MG/DL (ref 1.6–2.6)
MAGNESIUM SERPL-MCNC: 1.9 MG/DL (ref 1.6–2.6)
MAGNESIUM SERPL-MCNC: 1.9 MG/DL (ref 1.6–2.6)
MAGNESIUM SERPL-MCNC: 2 MG/DL (ref 1.6–2.6)
MAGNESIUM SERPL-MCNC: 2.1 MG/DL (ref 1.6–2.6)
MCH RBC QN AUTO: 23.5 PG (ref 26.6–33)
MCH RBC QN AUTO: 23.6 PG (ref 26.6–33)
MCH RBC QN AUTO: 23.8 PG (ref 26.6–33)
MCH RBC QN AUTO: 23.9 PG (ref 26.6–33)
MCH RBC QN AUTO: 24 PG (ref 26.6–33)
MCH RBC QN AUTO: 24.2 PG (ref 26.6–33)
MCH RBC QN AUTO: 24.4 PG (ref 26.6–33)
MCHC RBC AUTO-ENTMCNC: 26.8 G/DL (ref 31.5–35.7)
MCHC RBC AUTO-ENTMCNC: 29.7 G/DL (ref 31.5–35.7)
MCHC RBC AUTO-ENTMCNC: 30.2 G/DL (ref 31.5–35.7)
MCHC RBC AUTO-ENTMCNC: 30.6 G/DL (ref 31.5–35.7)
MCHC RBC AUTO-ENTMCNC: 30.8 G/DL (ref 31.5–35.7)
MCHC RBC AUTO-ENTMCNC: 31.9 G/DL (ref 31.5–35.7)
MCHC RBC AUTO-ENTMCNC: 32 G/DL (ref 31.5–35.7)
MCV RBC AUTO: 74.6 FL (ref 79–97)
MCV RBC AUTO: 76.3 FL (ref 79–97)
MCV RBC AUTO: 77.5 FL (ref 79–97)
MCV RBC AUTO: 78.7 FL (ref 79–97)
MCV RBC AUTO: 79.1 FL (ref 79–97)
MCV RBC AUTO: 80.1 FL (ref 79–97)
MCV RBC AUTO: 88.3 FL (ref 79–97)
MODALITY: ABNORMAL
MONOCYTES # BLD AUTO: 0.25 10*3/MM3 (ref 0.1–0.9)
MONOCYTES # BLD AUTO: 0.34 10*3/MM3 (ref 0.1–0.9)
MONOCYTES # BLD AUTO: 0.39 10*3/MM3 (ref 0.1–0.9)
MONOCYTES # BLD AUTO: 0.64 10*3/MM3 (ref 0.1–0.9)
MONOCYTES NFR BLD AUTO: 2.8 % (ref 5–12)
MONOCYTES NFR BLD AUTO: 4.9 % (ref 5–12)
MONOCYTES NFR BLD AUTO: 5.3 % (ref 5–12)
MONOCYTES NFR BLD AUTO: 7.6 % (ref 5–12)
MONOCYTES NFR FLD: 3 %
MRSA DNA SPEC QL NAA+PROBE: NORMAL
MUCOUS THREADS URNS QL MICRO: ABNORMAL /HPF
NEUTROPHILS NFR BLD AUTO: 11.41 10*3/MM3 (ref 1.7–7)
NEUTROPHILS NFR BLD AUTO: 4.32 10*3/MM3 (ref 1.7–7)
NEUTROPHILS NFR BLD AUTO: 6.25 10*3/MM3 (ref 1.7–7)
NEUTROPHILS NFR BLD AUTO: 7.06 10*3/MM3 (ref 1.7–7)
NEUTROPHILS NFR BLD AUTO: 83.8 % (ref 42.7–76)
NEUTROPHILS NFR BLD AUTO: 85.2 % (ref 42.7–76)
NEUTROPHILS NFR BLD AUTO: 85.4 % (ref 42.7–76)
NEUTROPHILS NFR BLD AUTO: 92.7 % (ref 42.7–76)
NEUTROPHILS NFR FLD MANUAL: 81 %
NITRITE UR QL STRIP: NEGATIVE
NITRITE UR QL STRIP: NEGATIVE
NOTIFIED WHO: ABNORMAL
NRBC BLD AUTO-RTO: 0 /100 WBC (ref 0–0.2)
NT-PROBNP SERPL-MCNC: ABNORMAL PG/ML (ref 0–900)
NUC CELL # FLD: 333.33 /MM3
PAW @ PEAK INSP FLOW SETTING VENT: 12 CMH2O
PAW @ PEAK INSP FLOW SETTING VENT: 12 CMH2O
PCO2 BLDA: 38.8 MM HG (ref 35–45)
PCO2 BLDA: 39.7 MM HG (ref 35–45)
PCO2 BLDA: 40.6 MM HG (ref 35–45)
PCO2 BLDA: 41 MM HG (ref 35–45)
PCO2 BLDA: 41.1 MM HG (ref 35–45)
PEEP RESPIRATORY: 5 CM[H2O]
PEEP RESPIRATORY: 5 CM[H2O]
PEEP RESPIRATORY: 6 CM[H2O]
PEEP RESPIRATORY: 6 CM[H2O]
PH BLDA: 7.13 PH UNITS (ref 7.35–7.45)
PH BLDA: 7.28 PH UNITS (ref 7.35–7.45)
PH BLDA: 7.32 PH UNITS (ref 7.35–7.45)
PH BLDA: 7.42 PH UNITS (ref 7.35–7.45)
PH BLDA: 7.46 PH UNITS (ref 7.35–7.45)
PH UR STRIP.AUTO: 6 [PH] (ref 5–8)
PH UR STRIP.AUTO: 6.5 [PH] (ref 5–8)
PHOSPHATE SERPL-MCNC: 2.9 MG/DL (ref 2.5–4.5)
PHOSPHATE SERPL-MCNC: 3.2 MG/DL (ref 2.5–4.5)
PHOSPHATE SERPL-MCNC: 3.4 MG/DL (ref 2.5–4.5)
PHOSPHATE SERPL-MCNC: 3.9 MG/DL (ref 2.5–4.5)
PHOSPHATE SERPL-MCNC: 4.1 MG/DL (ref 2.5–4.5)
PHOSPHATE SERPL-MCNC: 6.4 MG/DL (ref 2.5–4.5)
PLATELET # BLD AUTO: 100 10*3/MM3 (ref 140–450)
PLATELET # BLD AUTO: 106 10*3/MM3 (ref 140–450)
PLATELET # BLD AUTO: 121 10*3/MM3 (ref 140–450)
PLATELET # BLD AUTO: 127 10*3/MM3 (ref 140–450)
PLATELET # BLD AUTO: 153 10*3/MM3 (ref 140–450)
PLATELET # BLD AUTO: 188 10*3/MM3 (ref 140–450)
PLATELET # BLD AUTO: 85 10*3/MM3 (ref 140–450)
PMV BLD AUTO: 10 FL (ref 6–12)
PMV BLD AUTO: 10.1 FL (ref 6–12)
PMV BLD AUTO: 10.4 FL (ref 6–12)
PMV BLD AUTO: 11.4 FL (ref 6–12)
PMV BLD AUTO: 9 FL (ref 6–12)
PMV BLD AUTO: 9.8 FL (ref 6–12)
PMV BLD AUTO: ABNORMAL FL
PO2 BLD: 107 MM[HG] (ref 0–500)
PO2 BLD: 143 MM[HG] (ref 0–500)
PO2 BLD: 347 MM[HG] (ref 0–500)
PO2 BLD: 56 MM[HG] (ref 0–500)
PO2 BLD: 91 MM[HG] (ref 0–500)
PO2 BLDA: 114.2 MM HG (ref 80–100)
PO2 BLDA: 33.3 MM HG (ref 80–100)
PO2 BLDA: 50 MM HG (ref 80–100)
PO2 BLDA: 64.3 MM HG (ref 80–100)
PO2 BLDA: 97.1 MM HG (ref 80–100)
POTASSIUM BLDA-SCNC: 3.4 MMOL/L (ref 3.5–5)
POTASSIUM BLDA-SCNC: 3.9 MMOL/L (ref 3.5–5)
POTASSIUM BLDA-SCNC: 4 MMOL/L (ref 3.5–5)
POTASSIUM BLDA-SCNC: 4 MMOL/L (ref 3.5–5)
POTASSIUM SERPL-SCNC: 2.5 MMOL/L (ref 3.5–5.2)
POTASSIUM SERPL-SCNC: 3.7 MMOL/L (ref 3.5–5.2)
POTASSIUM SERPL-SCNC: 3.9 MMOL/L (ref 3.5–5.2)
POTASSIUM SERPL-SCNC: 3.9 MMOL/L (ref 3.5–5.2)
POTASSIUM SERPL-SCNC: 4.5 MMOL/L (ref 3.5–5.2)
POTASSIUM SERPL-SCNC: 4.6 MMOL/L (ref 3.5–5.2)
POTASSIUM SERPL-SCNC: 5.6 MMOL/L (ref 3.5–5.2)
PROCALCITONIN SERPL-MCNC: 0.99 NG/ML (ref 0–0.25)
PROCALCITONIN SERPL-MCNC: 1.27 NG/ML (ref 0–0.25)
PROCALCITONIN SERPL-MCNC: 152.4 NG/ML (ref 0–0.25)
PROT SERPL-MCNC: 4.6 G/DL (ref 6–8.5)
PROT SERPL-MCNC: 4.8 G/DL (ref 6–8.5)
PROT SERPL-MCNC: 5 G/DL (ref 6–8.5)
PROT SERPL-MCNC: 5.2 G/DL (ref 6–8.5)
PROT SERPL-MCNC: 5.3 G/DL (ref 6–8.5)
PROT SERPL-MCNC: 5.8 G/DL (ref 6–8.5)
PROT UR QL STRIP: ABNORMAL
PROT UR QL STRIP: ABNORMAL
QT INTERVAL: 343 MS
QT INTERVAL: 398 MS
QT INTERVAL: 404 MS
QT INTERVAL: 436 MS
QTC INTERVAL: 470 MS
QTC INTERVAL: 503 MS
QTC INTERVAL: 517 MS
QTC INTERVAL: 611 MS
RBC # BLD AUTO: 3.6 10*6/MM3 (ref 3.77–5.28)
RBC # BLD AUTO: 3.85 10*6/MM3 (ref 3.77–5.28)
RBC # BLD AUTO: 3.97 10*6/MM3 (ref 3.77–5.28)
RBC # BLD AUTO: 4.13 10*6/MM3 (ref 3.77–5.28)
RBC # BLD AUTO: 4.21 10*6/MM3 (ref 3.77–5.28)
RBC # BLD AUTO: 4.45 10*6/MM3 (ref 3.77–5.28)
RBC # BLD AUTO: 4.45 10*6/MM3 (ref 3.77–5.28)
RBC # FLD AUTO: ABNORMAL /MM3
RBC # UR STRIP: ABNORMAL /HPF
RBC # UR STRIP: ABNORMAL /HPF
READ BACK: YES
REF LAB TEST METHOD: ABNORMAL
REF LAB TEST METHOD: ABNORMAL
RENAL EPI CELLS #/AREA URNS HPF: ABNORMAL /HPF
RESPIRATORY RATE: 28
RESPIRATORY RATE: 28
RHINOVIRUS RNA SPEC NAA+PROBE: NOT DETECTED
RSV RNA NPH QL NAA+NON-PROBE: NOT DETECTED
S PNEUM AG SPEC QL LA: NEGATIVE
SAO2 % BLDCOA: 67.4 % (ref 95–99)
SAO2 % BLDCOA: 72.8 % (ref 95–99)
SAO2 % BLDCOA: 89.4 % (ref 95–99)
SAO2 % BLDCOA: 97.6 % (ref 95–99)
SAO2 % BLDCOA: 98.1 % (ref 95–99)
SARS-COV-2 RNA RESP QL NAA+PROBE: NOT DETECTED
SODIUM BLD-SCNC: 136 MMOL/L (ref 131–143)
SODIUM BLD-SCNC: 136 MMOL/L (ref 131–143)
SODIUM BLD-SCNC: 137 MMOL/L (ref 131–143)
SODIUM BLD-SCNC: 139 MMOL/L (ref 131–143)
SODIUM SERPL-SCNC: 130 MMOL/L (ref 136–145)
SODIUM SERPL-SCNC: 130 MMOL/L (ref 136–145)
SODIUM SERPL-SCNC: 132 MMOL/L (ref 136–145)
SODIUM SERPL-SCNC: 132 MMOL/L (ref 136–145)
SODIUM SERPL-SCNC: 133 MMOL/L (ref 136–145)
SODIUM SERPL-SCNC: 134 MMOL/L (ref 136–145)
SODIUM SERPL-SCNC: 135 MMOL/L (ref 136–145)
SP GR UR STRIP: 1.01 (ref 1–1.03)
SP GR UR STRIP: 1.02 (ref 1–1.03)
SQUAMOUS #/AREA URNS HPF: ABNORMAL /HPF
SQUAMOUS #/AREA URNS HPF: ABNORMAL /HPF
TRANS CELLS #/AREA URNS HPF: ABNORMAL /HPF
TROPONIN T % DELTA: -9
TROPONIN T NUMERIC DELTA: -5 NG/L
TROPONIN T SERPL HS-MCNC: 39 NG/L
TROPONIN T SERPL HS-MCNC: 55 NG/L
UROBILINOGEN UR QL STRIP: ABNORMAL
UROBILINOGEN UR QL STRIP: ABNORMAL
VENTILATOR MODE: ABNORMAL
VENTILATOR MODE: ABNORMAL
VENTILATOR MODE: AC
VENTILATOR MODE: AC
VT ON VENT VENT: 420 ML
VT ON VENT VENT: 420 ML
WBC # UR STRIP: ABNORMAL /HPF
WBC # UR STRIP: ABNORMAL /HPF
WBC NRBC COR # BLD AUTO: 12.31 10*3/MM3 (ref 3.4–10.8)
WBC NRBC COR # BLD AUTO: 13.37 10*3/MM3 (ref 3.4–10.8)
WBC NRBC COR # BLD AUTO: 16.3 10*3/MM3 (ref 3.4–10.8)
WBC NRBC COR # BLD AUTO: 5.06 10*3/MM3 (ref 3.4–10.8)
WBC NRBC COR # BLD AUTO: 7.35 10*3/MM3 (ref 3.4–10.8)
WBC NRBC COR # BLD AUTO: 8.42 10*3/MM3 (ref 3.4–10.8)
WBC NRBC COR # BLD AUTO: 9.37 10*3/MM3 (ref 3.4–10.8)
WHOLE BLOOD HOLD COAG: NORMAL
WHOLE BLOOD HOLD SPECIMEN: NORMAL
YEAST URNS QL MICRO: ABNORMAL /HPF
YEAST URNS QL MICRO: ABNORMAL /HPF

## 2025-01-01 PROCEDURE — 25010000002 HYDROCORTISONE SOD SUC (PF) 100 MG RECONSTITUTED SOLUTION: Performed by: STUDENT IN AN ORGANIZED HEALTH CARE EDUCATION/TRAINING PROGRAM

## 2025-01-01 PROCEDURE — C1894 INTRO/SHEATH, NON-LASER: HCPCS | Performed by: INTERNAL MEDICINE

## 2025-01-01 PROCEDURE — 83735 ASSAY OF MAGNESIUM: CPT | Performed by: STUDENT IN AN ORGANIZED HEALTH CARE EDUCATION/TRAINING PROGRAM

## 2025-01-01 PROCEDURE — 82945 GLUCOSE OTHER FLUID: CPT | Performed by: INTERNAL MEDICINE

## 2025-01-01 PROCEDURE — 87205 SMEAR GRAM STAIN: CPT | Performed by: NURSE PRACTITIONER

## 2025-01-01 PROCEDURE — 25010000002 FENTANYL CITRATE (PF) 50 MCG/ML SOLUTION: Performed by: INTERNAL MEDICINE

## 2025-01-01 PROCEDURE — 99291 CRITICAL CARE FIRST HOUR: CPT | Performed by: STUDENT IN AN ORGANIZED HEALTH CARE EDUCATION/TRAINING PROGRAM

## 2025-01-01 PROCEDURE — 87040 BLOOD CULTURE FOR BACTERIA: CPT | Performed by: EMERGENCY MEDICINE

## 2025-01-01 PROCEDURE — 84100 ASSAY OF PHOSPHORUS: CPT | Performed by: NURSE PRACTITIONER

## 2025-01-01 PROCEDURE — 25810000003 SODIUM CHLORIDE 0.9 % SOLUTION 250 ML FLEX CONT

## 2025-01-01 PROCEDURE — 80051 ELECTROLYTE PANEL: CPT

## 2025-01-01 PROCEDURE — 94799 UNLISTED PULMONARY SVC/PX: CPT

## 2025-01-01 PROCEDURE — 25010000002 MAGNESIUM SULFATE 2 GM/50ML SOLUTION

## 2025-01-01 PROCEDURE — 25010000002 VASOPRESSIN 20-5 UT/100ML-% SOLUTION: Performed by: NURSE PRACTITIONER

## 2025-01-01 PROCEDURE — 83605 ASSAY OF LACTIC ACID: CPT | Performed by: INTERNAL MEDICINE

## 2025-01-01 PROCEDURE — 94660 CPAP INITIATION&MGMT: CPT

## 2025-01-01 PROCEDURE — 82948 REAGENT STRIP/BLOOD GLUCOSE: CPT

## 2025-01-01 PROCEDURE — 25010000002 CEFEPIME PER 500 MG: Performed by: INTERNAL MEDICINE

## 2025-01-01 PROCEDURE — 84145 PROCALCITONIN (PCT): CPT | Performed by: STUDENT IN AN ORGANIZED HEALTH CARE EDUCATION/TRAINING PROGRAM

## 2025-01-01 PROCEDURE — 84145 PROCALCITONIN (PCT): CPT | Performed by: INTERNAL MEDICINE

## 2025-01-01 PROCEDURE — 25010000002 ALBUMIN HUMAN 25% PER 50 ML: Performed by: INTERNAL MEDICINE

## 2025-01-01 PROCEDURE — 94761 N-INVAS EAR/PLS OXIMETRY MLT: CPT

## 2025-01-01 PROCEDURE — 83605 ASSAY OF LACTIC ACID: CPT

## 2025-01-01 PROCEDURE — 99291 CRITICAL CARE FIRST HOUR: CPT | Performed by: INTERNAL MEDICINE

## 2025-01-01 PROCEDURE — 87070 CULTURE OTHR SPECIMN AEROBIC: CPT | Performed by: INTERNAL MEDICINE

## 2025-01-01 PROCEDURE — 25010000002 ONDANSETRON PER 1 MG: Performed by: INTERNAL MEDICINE

## 2025-01-01 PROCEDURE — 25010000002 BUMETANIDE PER 0.5 MG: Performed by: INTERNAL MEDICINE

## 2025-01-01 PROCEDURE — 0202U NFCT DS 22 TRGT SARS-COV-2: CPT

## 2025-01-01 PROCEDURE — 87449 NOS EACH ORGANISM AG IA: CPT

## 2025-01-01 PROCEDURE — 63710000001 INSULIN REGULAR HUMAN PER 5 UNITS

## 2025-01-01 PROCEDURE — C1729 CATH, DRAINAGE: HCPCS | Performed by: INTERNAL MEDICINE

## 2025-01-01 PROCEDURE — 36415 COLL VENOUS BLD VENIPUNCTURE: CPT | Performed by: EMERGENCY MEDICINE

## 2025-01-01 PROCEDURE — 80053 COMPREHEN METABOLIC PANEL: CPT | Performed by: STUDENT IN AN ORGANIZED HEALTH CARE EDUCATION/TRAINING PROGRAM

## 2025-01-01 PROCEDURE — 99292 CRITICAL CARE ADDL 30 MIN: CPT | Performed by: STUDENT IN AN ORGANIZED HEALTH CARE EDUCATION/TRAINING PROGRAM

## 2025-01-01 PROCEDURE — 71045 X-RAY EXAM CHEST 1 VIEW: CPT

## 2025-01-01 PROCEDURE — 81001 URINALYSIS AUTO W/SCOPE: CPT

## 2025-01-01 PROCEDURE — 94003 VENT MGMT INPAT SUBQ DAY: CPT

## 2025-01-01 PROCEDURE — 93005 ELECTROCARDIOGRAM TRACING: CPT | Performed by: STUDENT IN AN ORGANIZED HEALTH CARE EDUCATION/TRAINING PROGRAM

## 2025-01-01 PROCEDURE — 25010000002 ACETAMINOPHEN 10 MG/ML SOLUTION: Performed by: INTERNAL MEDICINE

## 2025-01-01 PROCEDURE — 80053 COMPREHEN METABOLIC PANEL: CPT | Performed by: INTERNAL MEDICINE

## 2025-01-01 PROCEDURE — 81001 URINALYSIS AUTO W/SCOPE: CPT | Performed by: EMERGENCY MEDICINE

## 2025-01-01 PROCEDURE — 25010000002 VASOPRESSIN 20-5 UT/100ML-% SOLUTION: Performed by: INTERNAL MEDICINE

## 2025-01-01 PROCEDURE — 86738 MYCOPLASMA ANTIBODY: CPT

## 2025-01-01 PROCEDURE — 25010000002 AMIODARONE IN DEXTROSE 5% 150-4.21 MG/100ML-% SOLUTION: Performed by: INTERNAL MEDICINE

## 2025-01-01 PROCEDURE — 25810000003 SODIUM CHLORIDE 0.9 % SOLUTION: Performed by: STUDENT IN AN ORGANIZED HEALTH CARE EDUCATION/TRAINING PROGRAM

## 2025-01-01 PROCEDURE — 83735 ASSAY OF MAGNESIUM: CPT

## 2025-01-01 PROCEDURE — 25010000002 AZITHROMYCIN PER 500 MG

## 2025-01-01 PROCEDURE — 82330 ASSAY OF CALCIUM: CPT

## 2025-01-01 PROCEDURE — 93010 ELECTROCARDIOGRAM REPORT: CPT | Performed by: INTERNAL MEDICINE

## 2025-01-01 PROCEDURE — 25010000002 MIDAZOLAM PER 1MG: Performed by: INTERNAL MEDICINE

## 2025-01-01 PROCEDURE — 99232 SBSQ HOSP IP/OBS MODERATE 35: CPT | Performed by: INTERNAL MEDICINE

## 2025-01-01 PROCEDURE — 99254 IP/OBS CNSLTJ NEW/EST MOD 60: CPT | Performed by: INTERNAL MEDICINE

## 2025-01-01 PROCEDURE — 25010000002 AMIODARONE IN DEXTROSE 5% 360-4.14 MG/200ML-% SOLUTION

## 2025-01-01 PROCEDURE — 87077 CULTURE AEROBIC IDENTIFY: CPT | Performed by: EMERGENCY MEDICINE

## 2025-01-01 PROCEDURE — 87070 CULTURE OTHR SPECIMN AEROBIC: CPT | Performed by: STUDENT IN AN ORGANIZED HEALTH CARE EDUCATION/TRAINING PROGRAM

## 2025-01-01 PROCEDURE — 33017 PRCRD DRG 6YR+ W/O CGEN CAR: CPT | Performed by: INTERNAL MEDICINE

## 2025-01-01 PROCEDURE — 36600 WITHDRAWAL OF ARTERIAL BLOOD: CPT

## 2025-01-01 PROCEDURE — 87015 SPECIMEN INFECT AGNT CONCNTJ: CPT | Performed by: INTERNAL MEDICINE

## 2025-01-01 PROCEDURE — 25010000002 MAGNESIUM SULFATE 2 GM/50ML SOLUTION: Performed by: NURSE PRACTITIONER

## 2025-01-01 PROCEDURE — 25010000002 LINEZOLID 600 MG/300ML SOLUTION: Performed by: INTERNAL MEDICINE

## 2025-01-01 PROCEDURE — 82803 BLOOD GASES ANY COMBINATION: CPT

## 2025-01-01 PROCEDURE — 94664 DEMO&/EVAL PT USE INHALER: CPT

## 2025-01-01 PROCEDURE — 84100 ASSAY OF PHOSPHORUS: CPT | Performed by: STUDENT IN AN ORGANIZED HEALTH CARE EDUCATION/TRAINING PROGRAM

## 2025-01-01 PROCEDURE — 82042 OTHER SOURCE ALBUMIN QUAN EA: CPT | Performed by: INTERNAL MEDICINE

## 2025-01-01 PROCEDURE — 87070 CULTURE OTHR SPECIMN AEROBIC: CPT | Performed by: NURSE PRACTITIONER

## 2025-01-01 PROCEDURE — 85027 COMPLETE CBC AUTOMATED: CPT | Performed by: STUDENT IN AN ORGANIZED HEALTH CARE EDUCATION/TRAINING PROGRAM

## 2025-01-01 PROCEDURE — 25010000002 POTASSIUM CHLORIDE PER 2 MEQ: Performed by: INTERNAL MEDICINE

## 2025-01-01 PROCEDURE — 87186 SC STD MICRODIL/AGAR DIL: CPT | Performed by: EMERGENCY MEDICINE

## 2025-01-01 PROCEDURE — 25010000002 HYDROMORPHONE 1 MG/ML SOLUTION: Performed by: STUDENT IN AN ORGANIZED HEALTH CARE EDUCATION/TRAINING PROGRAM

## 2025-01-01 PROCEDURE — P9047 ALBUMIN (HUMAN), 25%, 50ML: HCPCS | Performed by: INTERNAL MEDICINE

## 2025-01-01 PROCEDURE — 83735 ASSAY OF MAGNESIUM: CPT | Performed by: INTERNAL MEDICINE

## 2025-01-01 PROCEDURE — 25010000002 AMIODARONE IN DEXTROSE 5% 150-4.21 MG/100ML-% SOLUTION

## 2025-01-01 PROCEDURE — 25010000002 AMIODARONE IN DEXTROSE 5% 360-4.14 MG/200ML-% SOLUTION: Performed by: INTERNAL MEDICINE

## 2025-01-01 PROCEDURE — 36600 WITHDRAWAL OF ARTERIAL BLOOD: CPT | Performed by: STUDENT IN AN ORGANIZED HEALTH CARE EDUCATION/TRAINING PROGRAM

## 2025-01-01 PROCEDURE — 85025 COMPLETE CBC W/AUTO DIFF WBC: CPT | Performed by: INTERNAL MEDICINE

## 2025-01-01 PROCEDURE — 93005 ELECTROCARDIOGRAM TRACING: CPT | Performed by: INTERNAL MEDICINE

## 2025-01-01 PROCEDURE — 94668 MNPJ CHEST WALL SBSQ: CPT

## 2025-01-01 PROCEDURE — 82803 BLOOD GASES ANY COMBINATION: CPT | Performed by: STUDENT IN AN ORGANIZED HEALTH CARE EDUCATION/TRAINING PROGRAM

## 2025-01-01 PROCEDURE — 87086 URINE CULTURE/COLONY COUNT: CPT | Performed by: EMERGENCY MEDICINE

## 2025-01-01 PROCEDURE — 83735 ASSAY OF MAGNESIUM: CPT | Performed by: NURSE PRACTITIONER

## 2025-01-01 PROCEDURE — 87205 SMEAR GRAM STAIN: CPT | Performed by: INTERNAL MEDICINE

## 2025-01-01 PROCEDURE — 25010000003 DEXTROSE 5 % SOLUTION: Performed by: INTERNAL MEDICINE

## 2025-01-01 PROCEDURE — 83615 LACTATE (LD) (LDH) ENZYME: CPT | Performed by: INTERNAL MEDICINE

## 2025-01-01 PROCEDURE — 84100 ASSAY OF PHOSPHORUS: CPT | Performed by: INTERNAL MEDICINE

## 2025-01-01 PROCEDURE — 25010000002 MILRINONE LACTATE IN DEXTROSE 20-5 MG/100ML-% SOLUTION

## 2025-01-01 PROCEDURE — 89051 BODY FLUID CELL COUNT: CPT | Performed by: INTERNAL MEDICINE

## 2025-01-01 PROCEDURE — 84157 ASSAY OF PROTEIN OTHER: CPT | Performed by: INTERNAL MEDICINE

## 2025-01-01 PROCEDURE — 85027 COMPLETE CBC AUTOMATED: CPT | Performed by: NURSE PRACTITIONER

## 2025-01-01 PROCEDURE — 25010000002 HEPARIN (PORCINE) PER 1000 UNITS: Performed by: INTERNAL MEDICINE

## 2025-01-01 PROCEDURE — 25010000002 PHENYLEPHRINE 10 MG/ML SOLUTION: Performed by: STUDENT IN AN ORGANIZED HEALTH CARE EDUCATION/TRAINING PROGRAM

## 2025-01-01 PROCEDURE — 87641 MR-STAPH DNA AMP PROBE: CPT

## 2025-01-01 PROCEDURE — 25010000002 MAGNESIUM SULFATE IN D5W 1G/100ML (PREMIX) 1-5 GM/100ML-% SOLUTION: Performed by: NURSE PRACTITIONER

## 2025-01-01 PROCEDURE — 25010000002 AMIODARONE IN DEXTROSE 5% 360-4.14 MG/200ML-% SOLUTION: Performed by: STUDENT IN AN ORGANIZED HEALTH CARE EDUCATION/TRAINING PROGRAM

## 2025-01-01 PROCEDURE — 84484 ASSAY OF TROPONIN QUANT: CPT | Performed by: EMERGENCY MEDICINE

## 2025-01-01 PROCEDURE — 87206 SMEAR FLUORESCENT/ACID STAI: CPT | Performed by: INTERNAL MEDICINE

## 2025-01-01 PROCEDURE — 36415 COLL VENOUS BLD VENIPUNCTURE: CPT

## 2025-01-01 PROCEDURE — 87116 MYCOBACTERIA CULTURE: CPT | Performed by: INTERNAL MEDICINE

## 2025-01-01 PROCEDURE — 36620 INSERTION CATHETER ARTERY: CPT | Performed by: INTERNAL MEDICINE

## 2025-01-01 PROCEDURE — 93306 TTE W/DOPPLER COMPLETE: CPT

## 2025-01-01 PROCEDURE — 93308 TTE F-UP OR LMTD: CPT

## 2025-01-01 PROCEDURE — 84484 ASSAY OF TROPONIN QUANT: CPT | Performed by: STUDENT IN AN ORGANIZED HEALTH CARE EDUCATION/TRAINING PROGRAM

## 2025-01-01 PROCEDURE — 25810000003 LACTATED RINGERS SOLUTION: Performed by: STUDENT IN AN ORGANIZED HEALTH CARE EDUCATION/TRAINING PROGRAM

## 2025-01-01 PROCEDURE — 25010000002 LIDOCAINE 2% SOLUTION: Performed by: INTERNAL MEDICINE

## 2025-01-01 PROCEDURE — 25010000002 HYDROMORPHONE 1 MG/ML SOLUTION: Performed by: INTERNAL MEDICINE

## 2025-01-01 PROCEDURE — 85025 COMPLETE CBC W/AUTO DIFF WBC: CPT

## 2025-01-01 PROCEDURE — 99291 CRITICAL CARE FIRST HOUR: CPT

## 2025-01-01 PROCEDURE — 25810000003 SODIUM CHLORIDE 0.9 % SOLUTION: Performed by: EMERGENCY MEDICINE

## 2025-01-01 PROCEDURE — 25010000002 CEFTRIAXONE PER 250 MG: Performed by: EMERGENCY MEDICINE

## 2025-01-01 PROCEDURE — C1769 GUIDE WIRE: HCPCS | Performed by: INTERNAL MEDICINE

## 2025-01-01 PROCEDURE — 80048 BASIC METABOLIC PNL TOTAL CA: CPT | Performed by: NURSE PRACTITIONER

## 2025-01-01 PROCEDURE — 93308 TTE F-UP OR LMTD: CPT | Performed by: INTERNAL MEDICINE

## 2025-01-01 PROCEDURE — 93005 ELECTROCARDIOGRAM TRACING: CPT

## 2025-01-01 PROCEDURE — 76937 US GUIDE VASCULAR ACCESS: CPT | Performed by: INTERNAL MEDICINE

## 2025-01-01 PROCEDURE — 36556 INSERT NON-TUNNEL CV CATH: CPT | Performed by: INTERNAL MEDICINE

## 2025-01-01 PROCEDURE — 80053 COMPREHEN METABOLIC PANEL: CPT

## 2025-01-01 PROCEDURE — 83605 ASSAY OF LACTIC ACID: CPT | Performed by: STUDENT IN AN ORGANIZED HEALTH CARE EDUCATION/TRAINING PROGRAM

## 2025-01-01 PROCEDURE — 25010000002 CALCIUM GLUCONATE-NACL 1-0.675 GM/50ML-% SOLUTION

## 2025-01-01 PROCEDURE — 25010000002 DIGOXIN PER 500 MCG: Performed by: INTERNAL MEDICINE

## 2025-01-01 PROCEDURE — 83880 ASSAY OF NATRIURETIC PEPTIDE: CPT | Performed by: NURSE PRACTITIONER

## 2025-01-01 PROCEDURE — 99292 CRITICAL CARE ADDL 30 MIN: CPT | Performed by: INTERNAL MEDICINE

## 2025-01-01 PROCEDURE — 93005 ELECTROCARDIOGRAM TRACING: CPT | Performed by: EMERGENCY MEDICINE

## 2025-01-01 PROCEDURE — 25010000002 FUROSEMIDE PER 20 MG: Performed by: NURSE PRACTITIONER

## 2025-01-01 PROCEDURE — P9612 CATHETERIZE FOR URINE SPEC: HCPCS

## 2025-01-01 PROCEDURE — 94002 VENT MGMT INPAT INIT DAY: CPT

## 2025-01-01 PROCEDURE — 87205 SMEAR GRAM STAIN: CPT | Performed by: STUDENT IN AN ORGANIZED HEALTH CARE EDUCATION/TRAINING PROGRAM

## 2025-01-01 RX ORDER — MORPHINE SULFATE 15 MG/1
TABLET, FILM COATED, EXTENDED RELEASE ORAL
COMMUNITY
Start: 2025-01-01 | End: 2025-01-01

## 2025-01-01 RX ORDER — NOREPINEPHRINE BITARTRATE 0.03 MG/ML
.02-.3 INJECTION, SOLUTION INTRAVENOUS
Status: DISCONTINUED | OUTPATIENT
Start: 2025-01-01 | End: 2025-01-01

## 2025-01-01 RX ORDER — MAGNESIUM SULFATE HEPTAHYDRATE 40 MG/ML
2 INJECTION, SOLUTION INTRAVENOUS
Status: COMPLETED | OUTPATIENT
Start: 2025-01-01 | End: 2025-01-01

## 2025-01-01 RX ORDER — BISACODYL 10 MG
10 SUPPOSITORY, RECTAL RECTAL DAILY PRN
Status: DISCONTINUED | OUTPATIENT
Start: 2025-01-01 | End: 2025-01-01

## 2025-01-01 RX ORDER — POLYETHYLENE GLYCOL 3350 17 G/17G
17 POWDER, FOR SOLUTION ORAL DAILY PRN
Status: DISCONTINUED | OUTPATIENT
Start: 2025-01-01 | End: 2025-01-01

## 2025-01-01 RX ORDER — BISACODYL 5 MG/1
5 TABLET, DELAYED RELEASE ORAL DAILY PRN
Status: DISCONTINUED | OUTPATIENT
Start: 2025-01-01 | End: 2025-01-01

## 2025-01-01 RX ORDER — NOREPINEPHRINE BITARTRATE 0.03 MG/ML
.02-.6 INJECTION, SOLUTION INTRAVENOUS
Status: DISCONTINUED | OUTPATIENT
Start: 2025-01-01 | End: 2025-01-01 | Stop reason: HOSPADM

## 2025-01-01 RX ORDER — FAMOTIDINE 20 MG/1
40 TABLET, FILM COATED ORAL DAILY
Status: DISCONTINUED | OUTPATIENT
Start: 2025-01-01 | End: 2025-01-01

## 2025-01-01 RX ORDER — DIGOXIN 0.25 MG/ML
500 INJECTION INTRAMUSCULAR; INTRAVENOUS ONCE
Status: COMPLETED | OUTPATIENT
Start: 2025-01-01 | End: 2025-01-01

## 2025-01-01 RX ORDER — ALPRAZOLAM 0.25 MG
0.5 TABLET ORAL 4 TIMES DAILY PRN
Status: DISCONTINUED | OUTPATIENT
Start: 2025-01-01 | End: 2025-01-01 | Stop reason: HOSPADM

## 2025-01-01 RX ORDER — OXYCODONE HYDROCHLORIDE 5 MG/1
10 TABLET ORAL EVERY 6 HOURS PRN
Refills: 0 | Status: DISCONTINUED | OUTPATIENT
Start: 2025-01-01 | End: 2025-01-01 | Stop reason: HOSPADM

## 2025-01-01 RX ORDER — POTASSIUM CHLORIDE 1.5 G/1.58G
40 POWDER, FOR SOLUTION ORAL ONCE
Status: COMPLETED | OUTPATIENT
Start: 2025-01-01 | End: 2025-01-01

## 2025-01-01 RX ORDER — MIDAZOLAM HYDROCHLORIDE 2 MG/2ML
INJECTION, SOLUTION INTRAMUSCULAR; INTRAVENOUS
Status: DISCONTINUED | OUTPATIENT
Start: 2025-01-01 | End: 2025-01-01 | Stop reason: HOSPADM

## 2025-01-01 RX ORDER — LIDOCAINE 4 G/G
1 PATCH TOPICAL NIGHTLY
Status: DISCONTINUED | OUTPATIENT
Start: 2025-01-01 | End: 2025-01-01 | Stop reason: HOSPADM

## 2025-01-01 RX ORDER — MULTIVIT AND MINERALS-FERROUS GLUCONATE 9 MG IRON/15 ML ORAL LIQUID 9 MG/15 ML
15 LIQUID (ML) ORAL DAILY
Status: DISCONTINUED | OUTPATIENT
Start: 2025-01-01 | End: 2025-01-01 | Stop reason: HOSPADM

## 2025-01-01 RX ORDER — NICOTINE POLACRILEX 4 MG
15 LOZENGE BUCCAL
Status: DISCONTINUED | OUTPATIENT
Start: 2025-01-01 | End: 2025-01-01 | Stop reason: HOSPADM

## 2025-01-01 RX ORDER — VASOPRESSIN IN DEXTROSE 5 % 20/100 ML
0.03 PLASTIC BAG, INJECTION (ML) INTRAVENOUS CONTINUOUS
Status: DISCONTINUED | OUTPATIENT
Start: 2025-01-01 | End: 2025-01-01 | Stop reason: HOSPADM

## 2025-01-01 RX ORDER — ACETAMINOPHEN 650 MG/1
650 SUPPOSITORY RECTAL EVERY 4 HOURS PRN
Status: DISCONTINUED | OUTPATIENT
Start: 2025-01-01 | End: 2025-01-01 | Stop reason: HOSPADM

## 2025-01-01 RX ORDER — OXYCODONE HYDROCHLORIDE 5 MG/1
5 TABLET ORAL EVERY 6 HOURS PRN
Refills: 0 | Status: DISCONTINUED | OUTPATIENT
Start: 2025-01-01 | End: 2025-01-01

## 2025-01-01 RX ORDER — FENTANYL CITRATE 50 UG/ML
INJECTION, SOLUTION INTRAMUSCULAR; INTRAVENOUS
Status: DISCONTINUED | OUTPATIENT
Start: 2025-01-01 | End: 2025-01-01 | Stop reason: HOSPADM

## 2025-01-01 RX ORDER — OXYCODONE HYDROCHLORIDE 5 MG/1
5 TABLET ORAL EVERY 4 HOURS PRN
Refills: 0 | Status: DISCONTINUED | OUTPATIENT
Start: 2025-01-01 | End: 2025-01-01

## 2025-01-01 RX ORDER — IBUPROFEN 600 MG/1
1 TABLET ORAL
Status: DISCONTINUED | OUTPATIENT
Start: 2025-01-01 | End: 2025-01-01 | Stop reason: HOSPADM

## 2025-01-01 RX ORDER — POTASSIUM CHLORIDE 29.8 MG/ML
20 INJECTION INTRAVENOUS
Status: COMPLETED | OUTPATIENT
Start: 2025-01-01 | End: 2025-01-01

## 2025-01-01 RX ORDER — SODIUM CHLORIDE 0.9 % (FLUSH) 0.9 %
10 SYRINGE (ML) INJECTION AS NEEDED
Status: DISCONTINUED | OUTPATIENT
Start: 2025-01-01 | End: 2025-01-01 | Stop reason: HOSPADM

## 2025-01-01 RX ORDER — BISACODYL 10 MG
10 SUPPOSITORY, RECTAL RECTAL DAILY PRN
Status: DISCONTINUED | OUTPATIENT
Start: 2025-01-01 | End: 2025-01-01 | Stop reason: HOSPADM

## 2025-01-01 RX ORDER — MINERAL OIL/HYDROPHIL PETROLAT
1 OINTMENT (GRAM) TOPICAL 2 TIMES DAILY
Status: DISCONTINUED | OUTPATIENT
Start: 2025-01-01 | End: 2025-01-01 | Stop reason: HOSPADM

## 2025-01-01 RX ORDER — BUMETANIDE 0.25 MG/ML
2 INJECTION, SOLUTION INTRAMUSCULAR; INTRAVENOUS ONCE
Status: COMPLETED | OUTPATIENT
Start: 2025-01-01 | End: 2025-01-01

## 2025-01-01 RX ORDER — INDOMETHACIN 25 MG/1
50 CAPSULE ORAL ONCE
Status: COMPLETED | OUTPATIENT
Start: 2025-01-01 | End: 2025-01-01

## 2025-01-01 RX ORDER — SODIUM CHLORIDE 0.9 % (FLUSH) 0.9 %
10 SYRINGE (ML) INJECTION EVERY 12 HOURS SCHEDULED
Status: DISCONTINUED | OUTPATIENT
Start: 2025-01-01 | End: 2025-01-01 | Stop reason: HOSPADM

## 2025-01-01 RX ORDER — DEXTROSE MONOHYDRATE 25 G/50ML
25 INJECTION, SOLUTION INTRAVENOUS ONCE
Status: COMPLETED | OUTPATIENT
Start: 2025-01-01 | End: 2025-01-01

## 2025-01-01 RX ORDER — MIDODRINE HYDROCHLORIDE 10 MG/1
10 TABLET ORAL
Status: DISCONTINUED | OUTPATIENT
Start: 2025-01-01 | End: 2025-01-01 | Stop reason: HOSPADM

## 2025-01-01 RX ORDER — MILRINONE LACTATE 0.2 MG/ML
0.75 INJECTION, SOLUTION INTRAVENOUS
Status: DISCONTINUED | OUTPATIENT
Start: 2025-01-01 | End: 2025-01-01 | Stop reason: HOSPADM

## 2025-01-01 RX ORDER — NITROGLYCERIN 0.4 MG/1
0.4 TABLET SUBLINGUAL
Status: DISCONTINUED | OUTPATIENT
Start: 2025-01-01 | End: 2025-01-01 | Stop reason: HOSPADM

## 2025-01-01 RX ORDER — HEPARIN SODIUM 5000 [USP'U]/ML
5000 INJECTION, SOLUTION INTRAVENOUS; SUBCUTANEOUS EVERY 8 HOURS SCHEDULED
Status: DISCONTINUED | OUTPATIENT
Start: 2025-01-01 | End: 2025-01-01 | Stop reason: HOSPADM

## 2025-01-01 RX ORDER — IPRATROPIUM BROMIDE AND ALBUTEROL SULFATE 2.5; .5 MG/3ML; MG/3ML
3 SOLUTION RESPIRATORY (INHALATION)
Status: DISCONTINUED | OUTPATIENT
Start: 2025-01-01 | End: 2025-01-01 | Stop reason: HOSPADM

## 2025-01-01 RX ORDER — POTASSIUM CHLORIDE 1.5 G/1.58G
40 POWDER, FOR SOLUTION ORAL 2 TIMES DAILY
Status: DISCONTINUED | OUTPATIENT
Start: 2025-01-01 | End: 2025-01-01

## 2025-01-01 RX ORDER — ONDANSETRON 2 MG/ML
4 INJECTION INTRAMUSCULAR; INTRAVENOUS EVERY 6 HOURS PRN
Status: DISCONTINUED | OUTPATIENT
Start: 2025-01-01 | End: 2025-01-01 | Stop reason: HOSPADM

## 2025-01-01 RX ORDER — DEXAMETHASONE 2 MG/1
2 TABLET ORAL
COMMUNITY
Start: 2025-01-01

## 2025-01-01 RX ORDER — ROCURONIUM BROMIDE 10 MG/ML
INJECTION, SOLUTION INTRAVENOUS
Status: COMPLETED | OUTPATIENT
Start: 2025-01-01 | End: 2025-01-01

## 2025-01-01 RX ORDER — AMOXICILLIN 250 MG
2 CAPSULE ORAL 2 TIMES DAILY PRN
Status: DISCONTINUED | OUTPATIENT
Start: 2025-01-01 | End: 2025-01-01 | Stop reason: HOSPADM

## 2025-01-01 RX ORDER — MAGNESIUM SULFATE HEPTAHYDRATE 40 MG/ML
2 INJECTION, SOLUTION INTRAVENOUS ONCE
Status: COMPLETED | OUTPATIENT
Start: 2025-01-01 | End: 2025-01-01

## 2025-01-01 RX ORDER — ALBUMIN HUMAN 50 G/1000ML
12.5 SOLUTION INTRAVENOUS EVERY 6 HOURS
Status: DISCONTINUED | OUTPATIENT
Start: 2025-01-01 | End: 2025-01-01

## 2025-01-01 RX ORDER — MULTIPLE VITAMINS W/ MINERALS TAB 9MG-400MCG
1 TAB ORAL DAILY
Status: DISCONTINUED | OUTPATIENT
Start: 2025-01-01 | End: 2025-01-01 | Stop reason: CLARIF

## 2025-01-01 RX ORDER — MAGNESIUM SULFATE 1 G/100ML
2 INJECTION INTRAVENOUS ONCE
Status: DISCONTINUED | OUTPATIENT
Start: 2025-01-01 | End: 2025-01-01 | Stop reason: SDUPTHER

## 2025-01-01 RX ORDER — BUMETANIDE 0.25 MG/ML
2.5 INJECTION, SOLUTION INTRAMUSCULAR; INTRAVENOUS EVERY 8 HOURS
Status: DISCONTINUED | OUTPATIENT
Start: 2025-01-01 | End: 2025-01-01 | Stop reason: HOSPADM

## 2025-01-01 RX ORDER — ACETAMINOPHEN 10 MG/ML
1000 INJECTION, SOLUTION INTRAVENOUS ONCE
Status: COMPLETED | OUTPATIENT
Start: 2025-01-01 | End: 2025-01-01

## 2025-01-01 RX ORDER — MIDODRINE HYDROCHLORIDE 10 MG/1
10 TABLET ORAL
Status: DISCONTINUED | OUTPATIENT
Start: 2025-01-01 | End: 2025-01-01

## 2025-01-01 RX ORDER — ETOMIDATE 2 MG/ML
INJECTION INTRAVENOUS
Status: COMPLETED | OUTPATIENT
Start: 2025-01-01 | End: 2025-01-01

## 2025-01-01 RX ORDER — ALBUMIN (HUMAN) 12.5 G/50ML
12.5 SOLUTION INTRAVENOUS 3 TIMES DAILY
Status: COMPLETED | OUTPATIENT
Start: 2025-01-01 | End: 2025-01-01

## 2025-01-01 RX ORDER — HALOPERIDOL 1 MG/1
TABLET ORAL
COMMUNITY
Start: 2025-01-01 | End: 2025-01-01

## 2025-01-01 RX ORDER — SODIUM CHLORIDE 9 MG/ML
40 INJECTION, SOLUTION INTRAVENOUS AS NEEDED
Status: DISCONTINUED | OUTPATIENT
Start: 2025-01-01 | End: 2025-01-01 | Stop reason: HOSPADM

## 2025-01-01 RX ORDER — ALPRAZOLAM 0.25 MG
0.25 TABLET ORAL 4 TIMES DAILY PRN
Status: DISCONTINUED | OUTPATIENT
Start: 2025-01-01 | End: 2025-01-01

## 2025-01-01 RX ORDER — AMOXICILLIN 250 MG
2 CAPSULE ORAL 2 TIMES DAILY PRN
Status: DISCONTINUED | OUTPATIENT
Start: 2025-01-01 | End: 2025-01-01

## 2025-01-01 RX ORDER — ACETAMINOPHEN 325 MG/1
650 TABLET ORAL EVERY 6 HOURS PRN
Status: DISCONTINUED | OUTPATIENT
Start: 2025-01-01 | End: 2025-01-01 | Stop reason: HOSPADM

## 2025-01-01 RX ORDER — LINEZOLID 2 MG/ML
600 INJECTION, SOLUTION INTRAVENOUS EVERY 12 HOURS
Status: DISCONTINUED | OUTPATIENT
Start: 2025-01-01 | End: 2025-01-01 | Stop reason: HOSPADM

## 2025-01-01 RX ORDER — DEXTROSE MONOHYDRATE 25 G/50ML
25 INJECTION, SOLUTION INTRAVENOUS
Status: DISCONTINUED | OUTPATIENT
Start: 2025-01-01 | End: 2025-01-01 | Stop reason: HOSPADM

## 2025-01-01 RX ORDER — CLOPIDOGREL BISULFATE 75 MG/1
TABLET ORAL EVERY 24 HOURS
COMMUNITY
End: 2025-01-01

## 2025-01-01 RX ORDER — BUMETANIDE 0.25 MG/ML
2 INJECTION, SOLUTION INTRAMUSCULAR; INTRAVENOUS EVERY 8 HOURS
Status: DISCONTINUED | OUTPATIENT
Start: 2025-01-01 | End: 2025-01-01

## 2025-01-01 RX ORDER — HYDROCORTISONE SODIUM SUCCINATE 100 MG/2ML
100 INJECTION INTRAMUSCULAR; INTRAVENOUS EVERY 8 HOURS
Status: DISCONTINUED | OUTPATIENT
Start: 2025-01-01 | End: 2025-01-01 | Stop reason: HOSPADM

## 2025-01-01 RX ORDER — DEXTROSE MONOHYDRATE 25 G/50ML
INJECTION, SOLUTION INTRAVENOUS
Status: COMPLETED
Start: 2025-01-01 | End: 2025-01-01

## 2025-01-01 RX ORDER — LIDOCAINE HYDROCHLORIDE 20 MG/ML
INJECTION, SOLUTION INFILTRATION; PERINEURAL
Status: DISCONTINUED | OUTPATIENT
Start: 2025-01-01 | End: 2025-01-01 | Stop reason: HOSPADM

## 2025-01-01 RX ORDER — PHENYLEPHRINE HCL IN 0.9% NACL 0.5 MG/5ML
.5-3 SYRINGE (ML) INTRAVENOUS
Status: DISCONTINUED | OUTPATIENT
Start: 2025-01-01 | End: 2025-01-01 | Stop reason: HOSPADM

## 2025-01-01 RX ORDER — POLYETHYLENE GLYCOL 3350 17 G/17G
17 POWDER, FOR SOLUTION ORAL DAILY PRN
Status: DISCONTINUED | OUTPATIENT
Start: 2025-01-01 | End: 2025-01-01 | Stop reason: HOSPADM

## 2025-01-01 RX ORDER — INDOMETHACIN 25 MG/1
CAPSULE ORAL
Status: COMPLETED
Start: 2025-01-01 | End: 2025-01-01

## 2025-01-01 RX ORDER — MORPHINE SULFATE 20 MG/ML
SOLUTION ORAL
COMMUNITY
Start: 2025-01-01 | End: 2025-01-01

## 2025-01-01 RX ORDER — LIDOCAINE 4 G/G
1 PATCH TOPICAL
Status: DISCONTINUED | OUTPATIENT
Start: 2025-01-01 | End: 2025-01-01

## 2025-01-01 RX ORDER — POTASSIUM CHLORIDE 1.5 G/1.58G
40 POWDER, FOR SOLUTION ORAL 2 TIMES DAILY
Status: COMPLETED | OUTPATIENT
Start: 2025-01-01 | End: 2025-01-01

## 2025-01-01 RX ORDER — IPRATROPIUM BROMIDE AND ALBUTEROL SULFATE 2.5; .5 MG/3ML; MG/3ML
3 SOLUTION RESPIRATORY (INHALATION)
Status: DISCONTINUED | OUTPATIENT
Start: 2025-01-01 | End: 2025-01-01

## 2025-01-01 RX ORDER — MAGNESIUM SULFATE 1 G/100ML
2 INJECTION INTRAVENOUS ONCE
Status: COMPLETED | OUTPATIENT
Start: 2025-01-01 | End: 2025-01-01

## 2025-01-01 RX ORDER — FUROSEMIDE 10 MG/ML
40 INJECTION INTRAMUSCULAR; INTRAVENOUS ONCE
Status: COMPLETED | OUTPATIENT
Start: 2025-01-01 | End: 2025-01-01

## 2025-01-01 RX ORDER — NICOTINE 21 MG/24HR
1 PATCH, TRANSDERMAL 24 HOURS TRANSDERMAL
Status: DISCONTINUED | OUTPATIENT
Start: 2025-01-01 | End: 2025-01-01 | Stop reason: HOSPADM

## 2025-01-01 RX ORDER — CALCIUM GLUCONATE 20 MG/ML
1000 INJECTION, SOLUTION INTRAVENOUS ONCE
Status: COMPLETED | OUTPATIENT
Start: 2025-01-01 | End: 2025-01-01

## 2025-01-01 RX ORDER — ALBUMIN (HUMAN) 12.5 G/50ML
12.5 SOLUTION INTRAVENOUS ONCE
Status: COMPLETED | OUTPATIENT
Start: 2025-01-01 | End: 2025-01-01

## 2025-01-01 RX ORDER — ARFORMOTEROL TARTRATE 15 UG/2ML
15 SOLUTION RESPIRATORY (INHALATION)
Status: DISCONTINUED | OUTPATIENT
Start: 2025-01-01 | End: 2025-01-01 | Stop reason: HOSPADM

## 2025-01-01 RX ORDER — FAMOTIDINE 20 MG/1
40 TABLET, FILM COATED ORAL DAILY
Status: DISCONTINUED | OUTPATIENT
Start: 2025-01-01 | End: 2025-01-01 | Stop reason: HOSPADM

## 2025-01-01 RX ORDER — ALBUMIN (HUMAN) 12.5 G/50ML
12.5 SOLUTION INTRAVENOUS ONCE
Status: DISCONTINUED | OUTPATIENT
Start: 2025-01-01 | End: 2025-01-01

## 2025-01-01 RX ADMIN — IPRATROPIUM BROMIDE AND ALBUTEROL SULFATE 3 ML: .5; 3 SOLUTION RESPIRATORY (INHALATION) at 06:06

## 2025-01-01 RX ADMIN — LINEZOLID 600 MG: 600 INJECTION, SOLUTION INTRAVENOUS at 09:01

## 2025-01-01 RX ADMIN — AZITHROMYCIN DIHYDRATE 500 MG: 500 INJECTION, POWDER, LYOPHILIZED, FOR SOLUTION INTRAVENOUS at 21:41

## 2025-01-01 RX ADMIN — MAGNESIUM SULFATE HEPTAHYDRATE 2 G: 40 INJECTION, SOLUTION INTRAVENOUS at 08:04

## 2025-01-01 RX ADMIN — HEPARIN SODIUM 5000 UNITS: 5000 INJECTION INTRAVENOUS; SUBCUTANEOUS at 22:06

## 2025-01-01 RX ADMIN — DEXTROSE MONOHYDRATE 25 G: 25 INJECTION, SOLUTION INTRAVENOUS at 04:03

## 2025-01-01 RX ADMIN — MULTIVIT AND MINERALS-FERROUS GLUCONATE 9 MG IRON/15 ML ORAL LIQUID 15 ML: at 08:39

## 2025-01-01 RX ADMIN — ALPRAZOLAM 0.5 MG: 0.25 TABLET ORAL at 02:26

## 2025-01-01 RX ADMIN — OXYCODONE HYDROCHLORIDE 5 MG: 5 TABLET ORAL at 19:52

## 2025-01-01 RX ADMIN — MUPIROCIN OINTMENT 1 APPLICATION: 20 OINTMENT TOPICAL at 08:15

## 2025-01-01 RX ADMIN — ALBUMIN (HUMAN) 12.5 G: 0.25 INJECTION, SOLUTION INTRAVENOUS at 14:35

## 2025-01-01 RX ADMIN — AMIODARONE HYDROCHLORIDE 0.5 MG/MIN: 1.8 INJECTION, SOLUTION INTRAVENOUS at 14:33

## 2025-01-01 RX ADMIN — MIDODRINE HYDROCHLORIDE 10 MG: 10 TABLET ORAL at 12:00

## 2025-01-01 RX ADMIN — NICOTINE 1 PATCH: 21 PATCH, EXTENDED RELEASE TRANSDERMAL at 08:43

## 2025-01-01 RX ADMIN — SODIUM BICARBONATE 150 MEQ: 84 INJECTION INTRAVENOUS at 10:41

## 2025-01-01 RX ADMIN — AMIODARONE HYDROCHLORIDE 0.5 MG/MIN: 1.8 INJECTION, SOLUTION INTRAVENOUS at 04:00

## 2025-01-01 RX ADMIN — ALBUMIN (HUMAN) 12.5 G: 0.25 INJECTION, SOLUTION INTRAVENOUS at 21:37

## 2025-01-01 RX ADMIN — ALPRAZOLAM 0.25 MG: 0.25 TABLET ORAL at 19:51

## 2025-01-01 RX ADMIN — FAMOTIDINE 40 MG: 20 TABLET, FILM COATED ORAL at 09:29

## 2025-01-01 RX ADMIN — SODIUM CHLORIDE 1000 ML: 9 INJECTION, SOLUTION INTRAVENOUS at 14:11

## 2025-01-01 RX ADMIN — LIDOCAINE 1 PATCH: 4 PATCH TOPICAL at 21:24

## 2025-01-01 RX ADMIN — ONDANSETRON 4 MG: 2 INJECTION INTRAMUSCULAR; INTRAVENOUS at 20:22

## 2025-01-01 RX ADMIN — ARFORMOTEROL TARTRATE 15 MCG: 15 SOLUTION RESPIRATORY (INHALATION) at 22:05

## 2025-01-01 RX ADMIN — ARFORMOTEROL TARTRATE 15 MCG: 15 SOLUTION RESPIRATORY (INHALATION) at 06:28

## 2025-01-01 RX ADMIN — MULTIVIT AND MINERALS-FERROUS GLUCONATE 9 MG IRON/15 ML ORAL LIQUID 15 ML: at 08:48

## 2025-01-01 RX ADMIN — IPRATROPIUM BROMIDE AND ALBUTEROL SULFATE 3 ML: .5; 3 SOLUTION RESPIRATORY (INHALATION) at 06:37

## 2025-01-01 RX ADMIN — MUPIROCIN OINTMENT 1 APPLICATION: 20 OINTMENT TOPICAL at 21:23

## 2025-01-01 RX ADMIN — AMIODARONE HYDROCHLORIDE 150 MG: 1.5 INJECTION, SOLUTION INTRAVENOUS at 03:46

## 2025-01-01 RX ADMIN — INDOMETHACIN 50 MEQ: 25 CAPSULE ORAL at 02:30

## 2025-01-01 RX ADMIN — HEPARIN SODIUM 5000 UNITS: 5000 INJECTION INTRAVENOUS; SUBCUTANEOUS at 21:23

## 2025-01-01 RX ADMIN — MAGNESIUM SULFATE IN WATER FOR 2 G: 40 INJECTION INTRAVENOUS at 03:57

## 2025-01-01 RX ADMIN — SODIUM BICARBONATE 50 MEQ: 84 INJECTION INTRAVENOUS at 08:33

## 2025-01-01 RX ADMIN — HYDROMORPHONE HYDROCHLORIDE 0.5 MG: 1 INJECTION, SOLUTION INTRAMUSCULAR; INTRAVENOUS; SUBCUTANEOUS at 02:44

## 2025-01-01 RX ADMIN — ARFORMOTEROL TARTRATE 15 MCG: 15 SOLUTION RESPIRATORY (INHALATION) at 18:10

## 2025-01-01 RX ADMIN — POTASSIUM CHLORIDE 20 MEQ: 400 INJECTION, SOLUTION INTRAVENOUS at 10:54

## 2025-01-01 RX ADMIN — NOREPINEPHRINE BITARTRATE 0.5 MCG/KG/MIN: 32 SOLUTION INTRAVENOUS at 18:00

## 2025-01-01 RX ADMIN — IPRATROPIUM BROMIDE AND ALBUTEROL SULFATE 3 ML: .5; 3 SOLUTION RESPIRATORY (INHALATION) at 00:07

## 2025-01-01 RX ADMIN — Medication 10 ML: at 08:49

## 2025-01-01 RX ADMIN — ALPRAZOLAM 0.5 MG: 0.25 TABLET ORAL at 16:20

## 2025-01-01 RX ADMIN — HYDROMORPHONE HYDROCHLORIDE 0.5 MG: 1 INJECTION, SOLUTION INTRAMUSCULAR; INTRAVENOUS; SUBCUTANEOUS at 04:47

## 2025-01-01 RX ADMIN — BUMETANIDE 2.5 MG: 0.25 INJECTION INTRAMUSCULAR; INTRAVENOUS at 09:01

## 2025-01-01 RX ADMIN — MIDODRINE HYDROCHLORIDE 10 MG: 10 TABLET ORAL at 08:11

## 2025-01-01 RX ADMIN — ACETAMINOPHEN 650 MG: 325 TABLET ORAL at 02:26

## 2025-01-01 RX ADMIN — NOREPINEPHRINE BITARTRATE 0.2 MCG/KG/MIN: 32 SOLUTION INTRAVENOUS at 03:16

## 2025-01-01 RX ADMIN — ACETAMINOPHEN 650 MG: 325 TABLET ORAL at 08:42

## 2025-01-01 RX ADMIN — Medication 10 ML: at 08:12

## 2025-01-01 RX ADMIN — Medication 1 APPLICATION: at 12:41

## 2025-01-01 RX ADMIN — LIDOCAINE 1 PATCH: 4 PATCH TOPICAL at 02:02

## 2025-01-01 RX ADMIN — INSULIN HUMAN 10 UNITS: 100 INJECTION, SOLUTION PARENTERAL at 08:33

## 2025-01-01 RX ADMIN — ARFORMOTEROL TARTRATE 15 MCG: 15 SOLUTION RESPIRATORY (INHALATION) at 18:24

## 2025-01-01 RX ADMIN — ARFORMOTEROL TARTRATE 15 MCG: 15 SOLUTION RESPIRATORY (INHALATION) at 07:23

## 2025-01-01 RX ADMIN — MUPIROCIN OINTMENT 1 APPLICATION: 20 OINTMENT TOPICAL at 08:48

## 2025-01-01 RX ADMIN — OXYCODONE HYDROCHLORIDE 5 MG: 5 TABLET ORAL at 20:22

## 2025-01-01 RX ADMIN — MIDODRINE HYDROCHLORIDE 10 MG: 10 TABLET ORAL at 21:33

## 2025-01-01 RX ADMIN — NICOTINE 1 PATCH: 21 PATCH, EXTENDED RELEASE TRANSDERMAL at 09:47

## 2025-01-01 RX ADMIN — ARFORMOTEROL TARTRATE 15 MCG: 15 SOLUTION RESPIRATORY (INHALATION) at 20:14

## 2025-01-01 RX ADMIN — FAMOTIDINE 40 MG: 20 TABLET, FILM COATED ORAL at 21:32

## 2025-01-01 RX ADMIN — MIDODRINE HYDROCHLORIDE 10 MG: 10 TABLET ORAL at 08:48

## 2025-01-01 RX ADMIN — NOREPINEPHRINE BITARTRATE 0.29 MCG/KG/MIN: 32 SOLUTION INTRAVENOUS at 05:23

## 2025-01-01 RX ADMIN — LIDOCAINE 1 PATCH: 4 PATCH TOPICAL at 21:43

## 2025-01-01 RX ADMIN — OXYCODONE HYDROCHLORIDE 5 MG: 5 TABLET ORAL at 11:15

## 2025-01-01 RX ADMIN — HEPARIN SODIUM 5000 UNITS: 5000 INJECTION INTRAVENOUS; SUBCUTANEOUS at 06:22

## 2025-01-01 RX ADMIN — CEFEPIME 2000 MG: 2 INJECTION, POWDER, FOR SOLUTION INTRAVENOUS at 04:41

## 2025-01-01 RX ADMIN — VASOPRESSIN 0.03 UNITS/MIN: 0.2 INJECTION INTRAVENOUS at 12:33

## 2025-01-01 RX ADMIN — OXYCODONE HYDROCHLORIDE 10 MG: 5 TABLET ORAL at 02:26

## 2025-01-01 RX ADMIN — PHENYLEPHRINE HYDROCHLORIDE 3 MCG/KG/MIN: 10 INJECTION INTRAVENOUS at 09:45

## 2025-01-01 RX ADMIN — NOREPINEPHRINE BITARTRATE 0.49 MCG/KG/MIN: 32 SOLUTION INTRAVENOUS at 02:36

## 2025-01-01 RX ADMIN — Medication 10 ML: at 12:23

## 2025-01-01 RX ADMIN — CEFEPIME 2000 MG: 2 INJECTION, POWDER, FOR SOLUTION INTRAVENOUS at 21:23

## 2025-01-01 RX ADMIN — IPRATROPIUM BROMIDE AND ALBUTEROL SULFATE 3 ML: .5; 3 SOLUTION RESPIRATORY (INHALATION) at 07:23

## 2025-01-01 RX ADMIN — MILRINONE LACTATE 0.25 MCG/KG/MIN: 0.2 INJECTION, SOLUTION INTRAVENOUS at 11:08

## 2025-01-01 RX ADMIN — IPRATROPIUM BROMIDE AND ALBUTEROL SULFATE 3 ML: .5; 3 SOLUTION RESPIRATORY (INHALATION) at 22:05

## 2025-01-01 RX ADMIN — MUPIROCIN OINTMENT 1 APPLICATION: 20 OINTMENT TOPICAL at 08:44

## 2025-01-01 RX ADMIN — FAMOTIDINE 40 MG: 20 TABLET, FILM COATED ORAL at 08:15

## 2025-01-01 RX ADMIN — HYDROCORTISONE SODIUM SUCCINATE 100 MG: 100 INJECTION, POWDER, FOR SOLUTION INTRAMUSCULAR; INTRAVENOUS at 02:22

## 2025-01-01 RX ADMIN — CEFEPIME 2000 MG: 2 INJECTION, POWDER, FOR SOLUTION INTRAVENOUS at 21:46

## 2025-01-01 RX ADMIN — NICOTINE 1 PATCH: 21 PATCH, EXTENDED RELEASE TRANSDERMAL at 08:15

## 2025-01-01 RX ADMIN — LIDOCAINE 1 PATCH: 4 PATCH TOPICAL at 20:19

## 2025-01-01 RX ADMIN — HYDROCORTISONE SODIUM SUCCINATE 100 MG: 100 INJECTION, POWDER, FOR SOLUTION INTRAMUSCULAR; INTRAVENOUS at 17:26

## 2025-01-01 RX ADMIN — NOREPINEPHRINE BITARTRATE 0.5 MCG/KG/MIN: 32 SOLUTION INTRAVENOUS at 09:45

## 2025-01-01 RX ADMIN — OXYCODONE HYDROCHLORIDE 5 MG: 5 TABLET ORAL at 05:40

## 2025-01-01 RX ADMIN — HYDROCORTISONE SODIUM SUCCINATE 100 MG: 100 INJECTION, POWDER, FOR SOLUTION INTRAMUSCULAR; INTRAVENOUS at 09:29

## 2025-01-01 RX ADMIN — ALPRAZOLAM 0.25 MG: 0.25 TABLET ORAL at 23:01

## 2025-01-01 RX ADMIN — IPRATROPIUM BROMIDE AND ALBUTEROL SULFATE 3 ML: .5; 3 SOLUTION RESPIRATORY (INHALATION) at 13:29

## 2025-01-01 RX ADMIN — IPRATROPIUM BROMIDE AND ALBUTEROL SULFATE 3 ML: .5; 3 SOLUTION RESPIRATORY (INHALATION) at 18:10

## 2025-01-01 RX ADMIN — Medication 10 ML: at 21:34

## 2025-01-01 RX ADMIN — NICOTINE 1 PATCH: 21 PATCH, EXTENDED RELEASE TRANSDERMAL at 09:13

## 2025-01-01 RX ADMIN — MIDODRINE HYDROCHLORIDE 10 MG: 10 TABLET ORAL at 08:43

## 2025-01-01 RX ADMIN — ALPRAZOLAM 0.25 MG: 0.25 TABLET ORAL at 02:44

## 2025-01-01 RX ADMIN — POTASSIUM CHLORIDE 20 MEQ: 400 INJECTION, SOLUTION INTRAVENOUS at 09:57

## 2025-01-01 RX ADMIN — AMIODARONE HYDROCHLORIDE 1 MG/MIN: 1.8 INJECTION, SOLUTION INTRAVENOUS at 03:47

## 2025-01-01 RX ADMIN — Medication 100 MG: at 08:15

## 2025-01-01 RX ADMIN — AZITHROMYCIN DIHYDRATE 500 MG: 500 INJECTION, POWDER, LYOPHILIZED, FOR SOLUTION INTRAVENOUS at 22:05

## 2025-01-01 RX ADMIN — Medication 1 APPLICATION: at 20:17

## 2025-01-01 RX ADMIN — Medication 100 MG: at 09:29

## 2025-01-01 RX ADMIN — Medication 1 APPLICATION: at 08:16

## 2025-01-01 RX ADMIN — ALPRAZOLAM 0.25 MG: 0.25 TABLET ORAL at 05:40

## 2025-01-01 RX ADMIN — DIGOXIN 500 MCG: 0.25 INJECTION INTRAMUSCULAR; INTRAVENOUS at 21:46

## 2025-01-01 RX ADMIN — Medication 1 APPLICATION: at 21:23

## 2025-01-01 RX ADMIN — FAMOTIDINE 40 MG: 20 TABLET, FILM COATED ORAL at 08:11

## 2025-01-01 RX ADMIN — HEPARIN SODIUM 5000 UNITS: 5000 INJECTION INTRAVENOUS; SUBCUTANEOUS at 21:32

## 2025-01-01 RX ADMIN — IPRATROPIUM BROMIDE AND ALBUTEROL SULFATE 3 ML: .5; 3 SOLUTION RESPIRATORY (INHALATION) at 06:28

## 2025-01-01 RX ADMIN — Medication 1 APPLICATION: at 12:23

## 2025-01-01 RX ADMIN — DEXTROSE MONOHYDRATE 25 G: 25 INJECTION, SOLUTION INTRAVENOUS at 08:33

## 2025-01-01 RX ADMIN — ACETAMINOPHEN 1000 MG: 10 INJECTION, SOLUTION INTRAVENOUS at 06:22

## 2025-01-01 RX ADMIN — MUPIROCIN OINTMENT 1 APPLICATION: 20 OINTMENT TOPICAL at 21:45

## 2025-01-01 RX ADMIN — IPRATROPIUM BROMIDE AND ALBUTEROL SULFATE 3 ML: .5; 3 SOLUTION RESPIRATORY (INHALATION) at 20:14

## 2025-01-01 RX ADMIN — NICOTINE 1 PATCH: 21 PATCH, EXTENDED RELEASE TRANSDERMAL at 21:37

## 2025-01-01 RX ADMIN — HYDROMORPHONE HYDROCHLORIDE 0.5 MG: 1 INJECTION, SOLUTION INTRAMUSCULAR; INTRAVENOUS; SUBCUTANEOUS at 22:10

## 2025-01-01 RX ADMIN — NOREPINEPHRINE BITARTRATE 0.5 MCG/KG/MIN: 32 SOLUTION INTRAVENOUS at 12:32

## 2025-01-01 RX ADMIN — CEFEPIME 2000 MG: 2 INJECTION, POWDER, FOR SOLUTION INTRAVENOUS at 13:10

## 2025-01-01 RX ADMIN — PHENYLEPHRINE HYDROCHLORIDE 0.5 MCG/KG/MIN: 10 INJECTION INTRAVENOUS at 19:10

## 2025-01-01 RX ADMIN — VASOPRESSIN 0.03 UNITS/MIN: 0.2 INJECTION INTRAVENOUS at 03:19

## 2025-01-01 RX ADMIN — ALPRAZOLAM 0.25 MG: 0.25 TABLET ORAL at 12:10

## 2025-01-01 RX ADMIN — CEFEPIME 2000 MG: 2 INJECTION, POWDER, FOR SOLUTION INTRAVENOUS at 03:56

## 2025-01-01 RX ADMIN — CEFEPIME 2000 MG: 2 INJECTION, POWDER, FOR SOLUTION INTRAVENOUS at 04:50

## 2025-01-01 RX ADMIN — HEPARIN SODIUM 5000 UNITS: 5000 INJECTION INTRAVENOUS; SUBCUTANEOUS at 13:52

## 2025-01-01 RX ADMIN — AZITHROMYCIN DIHYDRATE 500 MG: 500 INJECTION, POWDER, LYOPHILIZED, FOR SOLUTION INTRAVENOUS at 21:32

## 2025-01-01 RX ADMIN — OXYCODONE HYDROCHLORIDE 5 MG: 5 TABLET ORAL at 12:09

## 2025-01-01 RX ADMIN — ACETAMINOPHEN 650 MG: 325 TABLET ORAL at 16:20

## 2025-01-01 RX ADMIN — IPRATROPIUM BROMIDE AND ALBUTEROL SULFATE 3 ML: .5; 3 SOLUTION RESPIRATORY (INHALATION) at 21:31

## 2025-01-01 RX ADMIN — Medication 10 ML: at 22:26

## 2025-01-01 RX ADMIN — MIDODRINE HYDROCHLORIDE 10 MG: 10 TABLET ORAL at 09:29

## 2025-01-01 RX ADMIN — CEFEPIME 2000 MG: 2 INJECTION, POWDER, FOR SOLUTION INTRAVENOUS at 21:32

## 2025-01-01 RX ADMIN — POTASSIUM CHLORIDE 40 MEQ: 1.5 POWDER, FOR SOLUTION ORAL at 21:46

## 2025-01-01 RX ADMIN — AMIODARONE HYDROCHLORIDE 0.5 MG/MIN: 1.8 INJECTION, SOLUTION INTRAVENOUS at 08:38

## 2025-01-01 RX ADMIN — OXYCODONE HYDROCHLORIDE 10 MG: 5 TABLET ORAL at 16:20

## 2025-01-01 RX ADMIN — CALCIUM GLUCONATE 1000 MG: 20 INJECTION, SOLUTION INTRAVENOUS at 08:34

## 2025-01-01 RX ADMIN — MIDODRINE HYDROCHLORIDE 10 MG: 10 TABLET ORAL at 17:16

## 2025-01-01 RX ADMIN — PHENYLEPHRINE HYDROCHLORIDE 2 MCG/KG/MIN: 10 INJECTION INTRAVENOUS at 07:10

## 2025-01-01 RX ADMIN — BUMETANIDE 2 MG: 0.25 INJECTION INTRAMUSCULAR; INTRAVENOUS at 09:29

## 2025-01-01 RX ADMIN — NOREPINEPHRINE BITARTRATE 0.25 MCG/KG/MIN: 32 SOLUTION INTRAVENOUS at 16:17

## 2025-01-01 RX ADMIN — ROCURONIUM BROMIDE 70 MG: 10 INJECTION INTRAVENOUS at 01:30

## 2025-01-01 RX ADMIN — MUPIROCIN OINTMENT 1 APPLICATION: 20 OINTMENT TOPICAL at 09:48

## 2025-01-01 RX ADMIN — CEFTRIAXONE SODIUM 1000 MG: 1 INJECTION, POWDER, FOR SOLUTION INTRAMUSCULAR; INTRAVENOUS at 14:12

## 2025-01-01 RX ADMIN — VASOPRESSIN 0.03 UNITS/MIN: 0.2 INJECTION INTRAVENOUS at 09:46

## 2025-01-01 RX ADMIN — VASOPRESSIN 0.03 UNITS/MIN: 0.2 INJECTION INTRAVENOUS at 15:18

## 2025-01-01 RX ADMIN — HYDROMORPHONE HYDROCHLORIDE 0.5 MG: 1 INJECTION, SOLUTION INTRAMUSCULAR; INTRAVENOUS; SUBCUTANEOUS at 00:16

## 2025-01-01 RX ADMIN — NOREPINEPHRINE BITARTRATE 0.02 MCG/KG/MIN: 0.03 INJECTION, SOLUTION INTRAVENOUS at 17:02

## 2025-01-01 RX ADMIN — ETOMIDATE 20 MG: 40 INJECTION, SOLUTION INTRAVENOUS at 01:29

## 2025-01-01 RX ADMIN — DEXTROSE MONOHYDRATE 25 G: 25 INJECTION, SOLUTION INTRAVENOUS at 07:34

## 2025-01-01 RX ADMIN — MUPIROCIN OINTMENT 1 APPLICATION: 20 OINTMENT TOPICAL at 20:12

## 2025-01-01 RX ADMIN — ALPRAZOLAM 0.25 MG: 0.25 TABLET ORAL at 11:15

## 2025-01-01 RX ADMIN — HEPARIN SODIUM 5000 UNITS: 5000 INJECTION INTRAVENOUS; SUBCUTANEOUS at 21:41

## 2025-01-01 RX ADMIN — Medication 10 ML: at 21:24

## 2025-01-01 RX ADMIN — MAGNESIUM SULFATE IN WATER FOR 2 G: 40 INJECTION INTRAVENOUS at 04:58

## 2025-01-01 RX ADMIN — ALBUMIN (HUMAN) 12.5 G: 0.25 INJECTION, SOLUTION INTRAVENOUS at 21:16

## 2025-01-01 RX ADMIN — NOREPINEPHRINE BITARTRATE 0.24 MCG/KG/MIN: 32 SOLUTION INTRAVENOUS at 17:16

## 2025-01-01 RX ADMIN — IPRATROPIUM BROMIDE AND ALBUTEROL SULFATE 3 ML: .5; 3 SOLUTION RESPIRATORY (INHALATION) at 09:47

## 2025-01-01 RX ADMIN — Medication 10 ML: at 20:18

## 2025-01-01 RX ADMIN — AMIODARONE HYDROCHLORIDE 150 MG: 1.5 INJECTION, SOLUTION INTRAVENOUS at 21:12

## 2025-01-01 RX ADMIN — VASOPRESSIN 0.03 UNITS/MIN: 0.2 INJECTION INTRAVENOUS at 18:01

## 2025-01-01 RX ADMIN — MIDODRINE HYDROCHLORIDE 10 MG: 10 TABLET ORAL at 17:26

## 2025-01-01 RX ADMIN — MULTIVIT AND MINERALS-FERROUS GLUCONATE 9 MG IRON/15 ML ORAL LIQUID 15 ML: at 08:26

## 2025-01-01 RX ADMIN — BUMETANIDE 2 MG: 0.25 INJECTION INTRAMUSCULAR; INTRAVENOUS at 00:45

## 2025-01-01 RX ADMIN — ALBUMIN (HUMAN) 12.5 G: 0.25 INJECTION, SOLUTION INTRAVENOUS at 00:45

## 2025-01-01 RX ADMIN — CEFEPIME 2000 MG: 2 INJECTION, POWDER, FOR SOLUTION INTRAVENOUS at 03:35

## 2025-01-01 RX ADMIN — PHENYLEPHRINE HYDROCHLORIDE 3 MCG/KG/MIN: 10 INJECTION INTRAVENOUS at 23:49

## 2025-01-01 RX ADMIN — NOREPINEPHRINE BITARTRATE 0.5 MCG/KG/MIN: 32 SOLUTION INTRAVENOUS at 04:38

## 2025-01-01 RX ADMIN — POTASSIUM CHLORIDE 40 MEQ: 1.5 POWDER, FOR SOLUTION ORAL at 04:57

## 2025-01-01 RX ADMIN — VASOPRESSIN 0.03 UNITS/MIN: 0.2 INJECTION INTRAVENOUS at 08:26

## 2025-01-01 RX ADMIN — ALBUMIN (HUMAN) 12.5 G: 0.25 INJECTION, SOLUTION INTRAVENOUS at 09:29

## 2025-01-01 RX ADMIN — HYDROCORTISONE SODIUM SUCCINATE 100 MG: 100 INJECTION, POWDER, FOR SOLUTION INTRAMUSCULAR; INTRAVENOUS at 17:16

## 2025-01-01 RX ADMIN — OXYCODONE HYDROCHLORIDE 5 MG: 5 TABLET ORAL at 02:44

## 2025-01-01 RX ADMIN — CEFEPIME 2000 MG: 2 INJECTION, POWDER, FOR SOLUTION INTRAVENOUS at 12:00

## 2025-01-01 RX ADMIN — CEFEPIME 2000 MG: 2 INJECTION, POWDER, FOR SOLUTION INTRAVENOUS at 20:05

## 2025-01-01 RX ADMIN — PHENYLEPHRINE HYDROCHLORIDE 3 MCG/KG/MIN: 10 INJECTION INTRAVENOUS at 04:42

## 2025-01-01 RX ADMIN — ACETAMINOPHEN 650 MG: 325 TABLET ORAL at 19:50

## 2025-01-01 RX ADMIN — MAGNESIUM SULFATE IN WATER FOR 2 G: 40 INJECTION INTRAVENOUS at 06:07

## 2025-01-01 RX ADMIN — CEFEPIME 2000 MG: 2 INJECTION, POWDER, FOR SOLUTION INTRAVENOUS at 11:45

## 2025-01-01 RX ADMIN — HYDROMORPHONE HYDROCHLORIDE 0.5 MG: 1 INJECTION, SOLUTION INTRAMUSCULAR; INTRAVENOUS; SUBCUTANEOUS at 16:14

## 2025-01-01 RX ADMIN — MIDODRINE HYDROCHLORIDE 10 MG: 10 TABLET ORAL at 17:43

## 2025-01-01 RX ADMIN — CEFEPIME 2000 MG: 2 INJECTION, POWDER, FOR SOLUTION INTRAVENOUS at 19:51

## 2025-01-01 RX ADMIN — Medication 1 APPLICATION: at 09:12

## 2025-01-01 RX ADMIN — AMIODARONE HYDROCHLORIDE 0.5 MG/MIN: 1.8 INJECTION, SOLUTION INTRAVENOUS at 18:45

## 2025-01-01 RX ADMIN — FUROSEMIDE 40 MG: 10 INJECTION, SOLUTION INTRAMUSCULAR; INTRAVENOUS at 16:17

## 2025-01-01 RX ADMIN — PHENYLEPHRINE HYDROCHLORIDE 3 MCG/KG/MIN: 10 INJECTION INTRAVENOUS at 18:00

## 2025-01-01 RX ADMIN — VASOPRESSIN 0.03 UNITS/MIN: 0.2 INJECTION INTRAVENOUS at 16:22

## 2025-01-01 RX ADMIN — Medication 10 ML: at 08:05

## 2025-01-01 RX ADMIN — MIDODRINE HYDROCHLORIDE 10 MG: 10 TABLET ORAL at 08:04

## 2025-01-01 RX ADMIN — LINEZOLID 600 MG: 600 INJECTION, SOLUTION INTRAVENOUS at 20:03

## 2025-01-01 RX ADMIN — MIDODRINE HYDROCHLORIDE 10 MG: 10 TABLET ORAL at 11:38

## 2025-01-01 RX ADMIN — HYDROMORPHONE HYDROCHLORIDE 0.5 MG: 1 INJECTION, SOLUTION INTRAMUSCULAR; INTRAVENOUS; SUBCUTANEOUS at 21:40

## 2025-01-01 RX ADMIN — NOREPINEPHRINE BITARTRATE 0.2 MCG/KG/MIN: 32 SOLUTION INTRAVENOUS at 18:43

## 2025-01-01 RX ADMIN — HEPARIN SODIUM 5000 UNITS: 5000 INJECTION INTRAVENOUS; SUBCUTANEOUS at 05:10

## 2025-01-01 RX ADMIN — MUPIROCIN OINTMENT 1 APPLICATION: 20 OINTMENT TOPICAL at 21:44

## 2025-01-01 RX ADMIN — HYDROCORTISONE SODIUM SUCCINATE 100 MG: 100 INJECTION, POWDER, FOR SOLUTION INTRAMUSCULAR; INTRAVENOUS at 02:28

## 2025-01-01 RX ADMIN — HYDROMORPHONE HYDROCHLORIDE 0.5 MG: 1 INJECTION, SOLUTION INTRAMUSCULAR; INTRAVENOUS; SUBCUTANEOUS at 08:04

## 2025-01-01 RX ADMIN — IPRATROPIUM BROMIDE AND ALBUTEROL SULFATE 3 ML: .5; 3 SOLUTION RESPIRATORY (INHALATION) at 13:19

## 2025-01-01 RX ADMIN — DEXTROSE MONOHYDRATE 25 G: 25 INJECTION, SOLUTION INTRAVENOUS at 04:20

## 2025-01-01 RX ADMIN — MULTIVIT AND MINERALS-FERROUS GLUCONATE 9 MG IRON/15 ML ORAL LIQUID 15 ML: at 10:26

## 2025-01-01 RX ADMIN — POTASSIUM CHLORIDE 40 MEQ: 1.5 POWDER, FOR SOLUTION ORAL at 22:10

## 2025-01-01 RX ADMIN — NOREPINEPHRINE BITARTRATE 0.5 MCG/KG/MIN: 32 SOLUTION INTRAVENOUS at 07:13

## 2025-01-01 RX ADMIN — CEFEPIME 2000 MG: 2 INJECTION, POWDER, FOR SOLUTION INTRAVENOUS at 11:42

## 2025-01-01 RX ADMIN — MULTIVIT AND MINERALS-FERROUS GLUCONATE 9 MG IRON/15 ML ORAL LIQUID 15 ML: at 12:17

## 2025-01-01 RX ADMIN — MIDODRINE HYDROCHLORIDE 10 MG: 10 TABLET ORAL at 11:40

## 2025-01-01 RX ADMIN — FAMOTIDINE 40 MG: 20 TABLET, FILM COATED ORAL at 08:48

## 2025-01-01 RX ADMIN — HEPARIN SODIUM 5000 UNITS: 5000 INJECTION INTRAVENOUS; SUBCUTANEOUS at 14:34

## 2025-01-01 RX ADMIN — PHENYLEPHRINE HYDROCHLORIDE 3 MCG/KG/MIN: 10 INJECTION INTRAVENOUS at 12:33

## 2025-01-01 RX ADMIN — OXYCODONE HYDROCHLORIDE 5 MG: 5 TABLET ORAL at 22:10

## 2025-01-01 RX ADMIN — ONDANSETRON 4 MG: 2 INJECTION INTRAMUSCULAR; INTRAVENOUS at 19:52

## 2025-01-01 RX ADMIN — BUMETANIDE 2 MG: 0.25 INJECTION INTRAMUSCULAR; INTRAVENOUS at 17:26

## 2025-01-01 RX ADMIN — ALPRAZOLAM 0.5 MG: 0.25 TABLET ORAL at 08:42

## 2025-01-01 RX ADMIN — SODIUM CHLORIDE, POTASSIUM CHLORIDE, SODIUM LACTATE AND CALCIUM CHLORIDE 500 ML: 600; 310; 30; 20 INJECTION, SOLUTION INTRAVENOUS at 03:57

## 2025-01-01 RX ADMIN — ARFORMOTEROL TARTRATE 15 MCG: 15 SOLUTION RESPIRATORY (INHALATION) at 06:06

## 2025-01-01 RX ADMIN — ARFORMOTEROL TARTRATE 15 MCG: 15 SOLUTION RESPIRATORY (INHALATION) at 09:46

## 2025-01-01 RX ADMIN — IPRATROPIUM BROMIDE AND ALBUTEROL SULFATE 3 ML: .5; 3 SOLUTION RESPIRATORY (INHALATION) at 12:12

## 2025-01-01 RX ADMIN — HYDROCORTISONE SODIUM SUCCINATE 100 MG: 100 INJECTION, POWDER, FOR SOLUTION INTRAMUSCULAR; INTRAVENOUS at 09:01

## 2025-01-01 RX ADMIN — Medication 100 MG: at 15:48

## 2025-01-01 RX ADMIN — Medication 100 MG: at 08:48

## 2025-01-01 RX ADMIN — HEPARIN SODIUM 5000 UNITS: 5000 INJECTION INTRAVENOUS; SUBCUTANEOUS at 15:19

## 2025-01-01 RX ADMIN — SODIUM BICARBONATE 50 MEQ: 84 INJECTION INTRAVENOUS at 02:30

## 2025-01-01 RX ADMIN — MIDODRINE HYDROCHLORIDE 10 MG: 10 TABLET ORAL at 11:46

## 2025-01-01 RX ADMIN — Medication 10 ML: at 08:44

## 2025-01-01 RX ADMIN — HEPARIN SODIUM 5000 UNITS: 5000 INJECTION INTRAVENOUS; SUBCUTANEOUS at 15:48

## 2025-01-01 RX ADMIN — FAMOTIDINE 40 MG: 20 TABLET, FILM COATED ORAL at 08:43

## 2025-01-01 RX ADMIN — Medication 100 MG: at 08:43

## 2025-01-01 RX ADMIN — ALPRAZOLAM 0.25 MG: 0.25 TABLET ORAL at 20:22

## 2025-01-01 RX ADMIN — ARFORMOTEROL TARTRATE 15 MCG: 15 SOLUTION RESPIRATORY (INHALATION) at 06:37

## 2025-01-01 RX ADMIN — CEFEPIME 2000 MG: 2 INJECTION, POWDER, FOR SOLUTION INTRAVENOUS at 04:01

## 2025-01-01 RX ADMIN — NOREPINEPHRINE BITARTRATE 0.5 MCG/KG/MIN: 32 SOLUTION INTRAVENOUS at 23:25

## 2025-01-01 RX ADMIN — AMIODARONE HYDROCHLORIDE 1 MG/MIN: 1.8 INJECTION, SOLUTION INTRAVENOUS at 21:30

## 2025-01-01 RX ADMIN — VASOPRESSIN 0.03 UNITS/MIN: 0.2 INJECTION INTRAVENOUS at 02:28

## 2025-01-01 RX ADMIN — MAGNESIUM SULFATE IN DEXTROSE 2 G: 10 INJECTION, SOLUTION INTRAVENOUS at 08:11

## 2025-01-01 RX ADMIN — HYDROMORPHONE HYDROCHLORIDE 0.5 MG: 1 INJECTION, SOLUTION INTRAMUSCULAR; INTRAVENOUS; SUBCUTANEOUS at 07:50

## 2025-01-01 RX ADMIN — IPRATROPIUM BROMIDE AND ALBUTEROL SULFATE 3 ML: .5; 3 SOLUTION RESPIRATORY (INHALATION) at 01:16

## 2025-01-01 RX ADMIN — SODIUM CHLORIDE 1000 ML: 9 INJECTION, SOLUTION INTRAVENOUS at 17:02

## 2025-01-01 RX ADMIN — MAGNESIUM SULFATE IN WATER FOR 2 G: 40 INJECTION INTRAVENOUS at 05:40

## 2025-01-01 RX ADMIN — MUPIROCIN OINTMENT 1 APPLICATION: 20 OINTMENT TOPICAL at 08:12

## 2025-01-02 ENCOUNTER — OFFICE VISIT (OUTPATIENT)
Dept: WOUND CARE | Facility: HOSPITAL | Age: 60
End: 2025-01-02
Payer: COMMERCIAL

## 2025-01-02 VITALS
SYSTOLIC BLOOD PRESSURE: 112 MMHG | TEMPERATURE: 97.1 F | HEART RATE: 105 BPM | DIASTOLIC BLOOD PRESSURE: 96 MMHG | RESPIRATION RATE: 16 BRPM

## 2025-01-02 DIAGNOSIS — S81.802A OPEN WOUND OF LEFT LOWER EXTREMITY, INITIAL ENCOUNTER: ICD-10-CM

## 2025-01-02 DIAGNOSIS — I87.8 VENOUS STASIS OF BOTH LOWER EXTREMITIES: Primary | ICD-10-CM

## 2025-01-02 DIAGNOSIS — L89.102 PRESSURE INJURY OF BACK, STAGE 2: ICD-10-CM

## 2025-01-02 DIAGNOSIS — S81.801A OPEN WOUND OF RIGHT LOWER EXTREMITY, INITIAL ENCOUNTER: ICD-10-CM

## 2025-01-02 DIAGNOSIS — C32.9 SQUAMOUS CELL CARCINOMA OF LARYNX: ICD-10-CM

## 2025-01-02 PROCEDURE — 1160F RVW MEDS BY RX/DR IN RCRD: CPT | Performed by: EMERGENCY MEDICINE

## 2025-01-02 PROCEDURE — 1159F MED LIST DOCD IN RCRD: CPT | Performed by: EMERGENCY MEDICINE

## 2025-01-02 PROCEDURE — G0463 HOSPITAL OUTPT CLINIC VISIT: HCPCS | Performed by: EMERGENCY MEDICINE

## 2025-01-02 NOTE — PROGRESS NOTES
Chief Complaint  Wound Check (ESTABLISHED PATIENT: LAST SEEN IN 01/2023. HERE TODAY FOR BILATERAL LEG WOUNDS THAT HAVE BEEN OPEN A FEW WEEKS. SHE HAS BEEN APPLYING MUPIROCIN ON AND OFF. )    Subjective      History of Present Illness    Isha Llanes  is a 59 y.o. female     History of Present Illness  The patient presents for evaluation of leg wounds.    She reports that her legs are in the best condition they have been for some time, with the most severe wounds located on the right lateral ankle and left distal tibia. These wounds have increased in size, but she does not consider them to be significantly problematic. She had been applying mupirocin daily to the wounds, which has been effective in promoting scab formation. However, due to the scabbing, she has reduced the frequency of application to every other day. Her primary care physician has recommended a vascular assessment. She is unable to stand upright and experiences difficulty breathing when lying flat, necessitating sleeping in a chair. She has been unable to use Unna boots due to severe itching and has had a reaction to Silvadene in the past, although she does not remember what the reaction was. She is interested in exploring massage therapy for her legs but is unsure if her insurance will cover it. She also reports a cold sensation in her legs and feet but says she can no longer take gabapentin.    She has been experiencing gastrointestinal discomfort and dry heaving, which she attributes to her steroid medication and lack of food intake this morning. She has been hospitalized for COVID-19 and has a history of squamous cell laryngeal cancer. She underwent 35 radiation treatments under the care of Dr. Nunez, which resulted in the loss of her voice. She is currently on a high dose of prednisone (40 mg twice daily) and requires a feeding tube, which was placed approximately 2 weeks ago during her hospital stay from 12/07/2024 to 12/12/2024. She is  scheduled for a vocal cord examination under anesthesia. She continues to smoke and has a history of lupus.    Her blood glucose levels have been elevated, typically in the 200s, and she uses a continuous glucose meter for monitoring.    She has a pressure sore on her back, which is being managed by her primary care physician and is almost healed.    SOCIAL HISTORY  The patient admits to smoking.    MEDICATIONS  Current: Prednisone, mupirocin    Allergies:  Amoxicillin, Ceclor [cefaclor], Penicillins, Sulfa antibiotics, Valium [diazepam], Ambien [zolpidem], Aspirin, Ativan [lorazepam], Benadryl [diphenhydramine], Biaxin [clarithromycin], Cephalexin, Clindamycin, Compazine [prochlorperazine edisylate], Contrast dye (echo or unknown ct/mr), Doxycycline, Nsaids, Phenergan [promethazine hcl], Promethazine, and Vancomycin      Current Outpatient Medications:     acetaminophen (Tylenol) 325 MG tablet, Take 1 tablet by mouth Every 6 (Six) Hours As Needed for Mild Pain, Headache or Fever., Disp: , Rfl:     albuterol sulfate  (90 Base) MCG/ACT inhaler, Inhale 2 puffs Every 4 (Four) Hours As Needed for Wheezing., Disp: 18 g, Rfl: 2    ALPRAZolam (XANAX) 0.25 MG tablet, Take 2 tablets by mouth 2 (Two) Times a Day As Needed., Disp: , Rfl:     cetirizine (zyrTEC) 10 MG tablet, Take 1 tablet by mouth Daily. (Patient not taking: Reported on 12/23/2024), Disp: , Rfl:     clopidogrel (PLAVIX) 75 MG tablet, Take 1 tablet by mouth Daily. (Patient not taking: Reported on 12/23/2024), Disp: 90 tablet, Rfl: 2    mupirocin (BACTROBAN) 2 % ointment, Apply 1 Application topically to the appropriate area as directed 3 (Three) Times a Day., Disp: 30 g, Rfl: 2    naloxone (NARCAN) 4 MG/0.1ML nasal spray, Call 911. Don't prime. [place] in 1 nostril for overdose. Repeat in 2-3 minutes in other nostril if no or minimal breathing/responsiveness., Disp: 2 each, Rfl: 0    pain patient supplied pump, by Intrathecal route Continuous. Type of  Medication: Hydromorphone 15 mg/ml and Bupivacaine 0.5 mg/ml Pentech [phone] Provider:Dr. Boudreaux Provider number: [phone] Basal Dose: Hydromorphone 7.518 mg/day Bupivacaine 0.13488 mg/day Pump capacity: 40ml ( MAX of Hydromorphone 9.456 mg/ day; Bupivacaine 0.58439 mg /day) Bolus Dose:Hydromorphone 0.500 mg, Bupivacaine 0.61710 mg Max activations: 4 per day Lockout 3 hours. 1 Bolus per every 3 hours  Next refill-  01/07/2025 Alarm date- 01/12/2025 Pump manufacture:BitInstant, Disp: , Rfl:     predniSONE (DELTASONE) 20 MG tablet, Take 2 tablets by mouth 2 (Two) Times a Day., Disp: 120 tablet, Rfl: 1    Past Medical History:   Diagnosis Date    Anesthesia     REPORTS HAS GOT REAL EMOTIONAL AND HAS BECOME ANGRY BEFORE    Anxiety     Aortic stenosis, severe     HAS BIO VALVE REPLACED    Arthritis     Asthma     Back pain     Blood clotting disorder     Cancer     Cancer associated pain     HODGKINS LYMPHOMA    CHF (congestive heart failure)     Chronic low back pain with sciatica     Chronic pain disorder     COPD (chronic obstructive pulmonary disease)     Coronary artery disease     Diabetes mellitus     TYPE 2    Diabetes mellitus     Dyspnea on effort     GERD (gastroesophageal reflux disease)     H/O lumpectomy     H/O: hysterectomy     Hiatal hernia     History of degenerative disc disease     History of kidney stones     History of pulmonary embolus (PE)     History of transfusion     AS A CHILD NO TRANSFUSION REACTION    History of transfusion     Hodgkin's lymphoma     5/19/23  5 YEARS SINCE LAST CHEMO TX    Hypertension     Immobility     Liver disease     DENIES ANY CURRENT ISSUES    Lupus     Mitral valve prolapse     Nausea vomiting and diarrhea 3/7/2024    Panic disorder     Pelvic pain     Peripheral neuropathy     Personal history of DVT (deep vein thrombosis)     Presence of intrathecal pump     PTSD (post-traumatic stress disorder)     Sacroiliac joint disease     SI (sacroiliac) joint  inflammation     Sleep apnea     Venous insufficiency      Past Surgical History:   Procedure Laterality Date    ABDOMINAL SURGERY      BACK SURGERY      SPINAL FUSION     BACK SURGERY      BLADDER REPAIR      CARDIAC CATHETERIZATION N/A 03/06/2019    Procedure: Valvuloplasty;  Surgeon: Wayne Johnson MD;  Location: [facility] INVASIVE LOCATION;  Service: Cardiology    CARDIAC CATHETERIZATION      CARDIAC CATHETERIZATION Left 5/31/2023    Procedure: Cardiac Catheterization/Vascular Study;  Surgeon: Poncho Reid MD;  Location: [facility] CATH INVASIVE LOCATION;  Service: Cardiovascular;  Laterality: Left;    CARDIAC SURGERY      CARDIAC VALVE REPLACEMENT  2021    CHOLECYSTECTOMY      COLONOSCOPY N/A 12/29/2021    Procedure: COLONOSCOPY;  Surgeon: Karine Orlando MD;  Location: [facility] ENDOSCOPY;  Service: Gastroenterology;  Laterality: N/A;  POOR PREP    COLONOSCOPY N/A 04/13/2022    Procedure: COLONOSCOPY WITH POLYPECTOMY;  Surgeon: Karine Orlando MD;  Location: [facility] ENDOSCOPY;  Service: Gastroenterology;  Laterality: N/A;  COLON POLYP, HEMORRHOIDS, POOR PREP    COLONOSCOPY      DIRECT LARYNGOSCOPY, ESOPHAGOSCOPY, BRONCHOSCOPY N/A 5/22/2023    Procedure: DIRECT LARYNGOSCOPY WITH BIOPSIES , ESOPHAGOSCOPY, BRONCHOSCOPY;  Surgeon: Ronnie Mcgarry MD;  Location: [facility] OR [place];  Service: ENT;  Laterality: N/A;    ENDOSCOPY N/A 12/29/2021    Procedure: ESOPHAGOGASTRODUODENOSCOPY;  Surgeon: Karine Orlando MD;  Location: [facility] ENDOSCOPY;  Service: Gastroenterology;  Laterality: N/A;  ESOPHAGITIS, GASTRITIS, HIATAL HERNIA    ENDOSCOPY      ENDOSCOPY N/A 4/16/2024    Procedure: ESOPHAGOGASTRODUODENOSCOPY WITH BIOPSIES;  Surgeon: Karine Orlando MD;  Location: [facility] ENDOSCOPY;  Service: Gastroenterology;  Laterality: N/A;  ESOPHAGITIS, HIATAL HERNIA    ENDOSCOPY W/ PEG TUBE PLACEMENT N/A 12/10/2024    Procedure: ESOPHAGOGASTRODUODENOSCOPY WITH PERCUTANEOUS ENDOSCOPIC  GASTROSTOMY TUBE INSERTION WITH ANESTHESIA;  Surgeon: Rodger Ortega MD;  Location: [facility] ENDOSCOPY;  Service: Gastroenterology;  Laterality: N/A;  PEG TUBE INSERTION    HYSTERECTOMY      LYMPHADENECTOMY      PAIN PUMP INSERTION/REVISION N/A 10/18/2019    Procedure: PAIN PUMP INSERTION \[facility] [place] [facility]\"" 10-18-19 [place];  Surgeon: Horace Rios MD;  Location: [facility] OR;  Service: Pain Management    PAIN PUMP INSERTION/REVISION N/A 11/23/2020    Procedure: pain pump removal;  Surgeon: Horace Rios MD;  Location: [facility] OR;  Service: Pain Management;  Laterality: N/A;    PEG TUBE INSERTION N/A 7/26/2023    Procedure: PERCUTANEOUS ENDOSCOPIC GASTROSTOMY TUBE INSERTION;  Surgeon: Kody Mercer MD;  Location: [facility] ENDOSCOPY;  Service: General;  Laterality: N/A;  SUCCESSFUL PEG TUBE PLACE PLACEMENT    PORTACATH PLACEMENT      IN LEFT CHEST REPORTS THAT IT IS NOT FUNCTIONING    SKIN BIOPSY      TONSILLECTOMY      TUBAL ABDOMINAL LIGATION      TUMOR REMOVAL       Social History     Socioeconomic History    Marital status: Legally    Tobacco Use    Smoking status: Every Day     Current packs/day: 1.50     Average packs/day: 1.5 packs/day for 46.0 years (69.0 ttl pk-yrs)     Types: Cigarettes     Start date: 1979     Passive exposure: Current    Smokeless tobacco: Never   Vaping Use    Vaping status: Never Used   Substance and Sexual Activity    Alcohol use: Never    Drug use: Never    Sexual activity: Not Currently           Objective     Vitals:    01/02/25 1137   BP: 112/96   BP Location: Left arm   Patient Position: Sitting   Cuff Size: Adult   Pulse: 105   Resp: 16  Comment: O2: 94% ON RA   Temp: 97.1 °F (36.2 °C)   TempSrc: Temporal   PainSc:   8   PainLoc: Leg  Comment: BILATERAL LEG PAIN     There is no height or weight on file to calculate BMI.    STEADI Fall Risk Assessment has not been completed.     Review of Systems     ROS:  Per HPI.     I have reviewed the HPI and ROS as  documented by MA/RN. Chelo Villeda MD    Physical Exam     NAD  AAOx3, pleasant, cooperative    Palpable pedal pulses with brisk capillary refill.  Patient with chronic venous stasis of both lower extremities.  She has a few small shallow ulcerations of the lateral right lower leg and to wounds of the left lateral lower leg which are slightly more severe.  The distal left leg wound exhibits slough.  None of the wounds show evidence of infection.  Small dry nearly healed pressure ulcer mid thoracic spine just left of midline.    RLE:            LLE:            Result Review :  The following data was reviewed by: Chelo Villeda MD on 01/02/2025:    Prior notes and images.  Discharge summary, radiation oncology note, CBC, CMP, inpatient wound images, patient's swelling and wounds were healthier during her hospitalization in the ER today.               Assessment and Plan   Diagnoses and all orders for this visit:    1. Venous stasis of both lower extremities (Primary)    2. Open wound of right lower extremity, initial encounter    3. Open wound of left lower extremity, initial encounter    4. Squamous cell carcinoma of larynx    5. Pressure injury of back, stage 2        Assessment & Plan  1. Venous stasis in the lower extremities.  The venous stasis is likely due to prolonged sitting and keeping her legs in a dependent position at all time, resulting in blood pooling and skin discoloration. Arterial blood flow appears to be adequate. The cold sensation in her legs is attributed to neuropathy. She is advised to elevate her legs and keep them wrapped to reduce swelling and improve skin health. Bacitracin will be applied to the wounds, and Vaseline or Aquaphor will be used for the rest of the skin. Compression stockings are recommended once the swelling decreases.  Patient has a history of noncompliance and is typically unwilling to do any elevation or wrapping to help manage her condition, making it difficult to heal  her wounds.  Fortunately, her swelling is not as severe today as it has been in the past.    2. Laryngeal cancer.  She has been undergoing treatment for laryngeal cancer, including 35 radiation treatments with Dr. Nunez. She lost her voice a year later and is currently on high-dose steroids (Prednisone 40 mg twice a day) for laryngeal edema. She has a feeding tube placed about 2 weeks ago while in the hospital due to aspiration risk.    3. Hyperglycemia.  Her blood sugar levels have been running high, often in the 200s, which may be exacerbated by her high-dose steroid treatment. She is advised to monitor her blood sugar closely and use her continuous glucose meter to track levels.    4. Lupus.  She has a history of lupus, which may contribute to her overall health challenges.    5. Pressure sore on her back.  She is advised to keep the pressure off the sore and apply bacitracin and a Band-Aid.  That area is almost healed.    6.  Tobacco dependence.  Patient continues to smoke cigarettes in spite of her history of laryngeal cancer and ongoing epiglottic and laryngeal edema.  Complete smoking cessation is advised for wound healing and overall health.    Follow-up  The patient will follow up in 1 month.    PROCEDURE  A feeding tube was placed approximately 2 weeks ago during her hospital stay from 12/07/2024 to 12/12/2024.      Patient was given instructions and counseling regarding their condition or for health maintenance advice, as well as the wound care plan and recommendations. They understand and agree with the plan.  They will follow back up here in the clinic but are instructed to contact us in the interim should they have any new, returning, or worsening symptoms or concerns. Please see specific information pulled into the AVS if appropriate.     Dragon Dictation utilized for chart completion.    I spent 52 minutes in both face-to-face and nonface-to-face time for patient care today including, but not  limited to, review of old records, reviewing old images, history and physical exam, reviewing test results, counseling patient, entering orders, coordinating care, and documenting.      Follow Up   Return in about 4 weeks (around 1/30/2025).      Chelo Villeda MD    Patient or patient representative verbalized consent for the use of Ambient Listening during the visit with  Chelo Villeda MD for chart documentation. 1/2/2025  17:33 EST

## 2025-01-03 ENCOUNTER — READMISSION MANAGEMENT (OUTPATIENT)
Dept: CALL CENTER | Facility: HOSPITAL | Age: 60
End: 2025-01-03
Payer: COMMERCIAL

## 2025-01-03 NOTE — OUTREACH NOTE
General Surgery Week 3 Survey      Flowsheet Row Responses   [facility] patient discharged from? [facility]   Does the patient have one of the following disease processes/diagnoses(primary or secondary)? General Surgery   Week 3 attempt successful? Yes   Call start time 1038   Call end time 1040   Discharge diagnosis ENDOSCOPY W/ PEG TUBE PLACEMENT-COVID-19 virus infection   Person spoke with today (if not patient) and relationship patient   Meds reviewed with patient/caregiver? Yes   Is the patient having any side effects they believe may be caused by any medication additions or changes? No   Does the patient have all medications related to this admission filled (includes all antibiotics, pain medications, etc.) Yes   Is the patient taking all medications as directed (includes completed medication regime)? Yes   Does the patient have a follow up appointment scheduled with their surgeon? Yes   Psychosocial issues? No   Did the patient receive a copy of their discharge instructions? Yes   Nursing interventions Reviewed instructions with patient   What is the patient's perception of their health status since discharge? Same   Is the patient/caregiver able to teach back signs and symptoms of incisional infection? Increased redness, swelling or pain at the incisonal site, Fever   Is the patient/caregiver able to teach back steps to recovery at home? Set small, achievable goals for return to baseline health, Rest and rebuild strength, gradually increase activity   Is the patient/caregiver able to teach back the hierarchy of who to call/visit for symptoms/problems? PCP, Specialist, Home health nurse, Urgent Care, ED, 911 Yes   Week 3 call completed? Yes   Graduated Yes   Call end time 1040            Ortiz SANCHEZ - Registered Nurse

## 2025-01-22 ENCOUNTER — TRANSCRIBE ORDERS (OUTPATIENT)
Dept: ADMINISTRATIVE | Facility: HOSPITAL | Age: 60
End: 2025-01-22
Payer: COMMERCIAL

## 2025-01-22 DIAGNOSIS — C32.9 LARYNGEAL CANCER: Primary | ICD-10-CM

## 2025-02-03 ENCOUNTER — OFFICE VISIT (OUTPATIENT)
Dept: WOUND CARE | Facility: HOSPITAL | Age: 60
End: 2025-02-03
Payer: COMMERCIAL

## 2025-02-03 ENCOUNTER — HOSPITAL ENCOUNTER (OUTPATIENT)
Dept: PET IMAGING | Facility: HOSPITAL | Age: 60
Discharge: HOME | End: 2025-02-03
Payer: COMMERCIAL

## 2025-02-03 ENCOUNTER — APPOINTMENT (OUTPATIENT)
Dept: RADIATION ONCOLOGY | Facility: HOSPITAL | Age: 60
End: 2025-02-03
Payer: COMMERCIAL

## 2025-02-03 VITALS
TEMPERATURE: 98.2 F | SYSTOLIC BLOOD PRESSURE: 113 MMHG | RESPIRATION RATE: 18 BRPM | DIASTOLIC BLOOD PRESSURE: 80 MMHG | HEART RATE: 112 BPM

## 2025-02-03 DIAGNOSIS — S81.801D OPEN WOUND OF RIGHT LOWER EXTREMITY, SUBSEQUENT ENCOUNTER: ICD-10-CM

## 2025-02-03 DIAGNOSIS — E11.65 UNCONTROLLED TYPE 2 DIABETES MELLITUS WITH HYPERGLYCEMIA: ICD-10-CM

## 2025-02-03 DIAGNOSIS — S81.802D OPEN WOUND OF LEFT LOWER EXTREMITY, SUBSEQUENT ENCOUNTER: ICD-10-CM

## 2025-02-03 DIAGNOSIS — I87.8 VENOUS STASIS OF BOTH LOWER EXTREMITIES: Primary | ICD-10-CM

## 2025-02-03 DIAGNOSIS — F17.210 TOBACCO DEPENDENCE DUE TO CIGARETTES: ICD-10-CM

## 2025-02-03 DIAGNOSIS — C32.9 SQUAMOUS CELL CARCINOMA OF LARYNX: ICD-10-CM

## 2025-02-03 PROCEDURE — 1160F RVW MEDS BY RX/DR IN RCRD: CPT | Performed by: EMERGENCY MEDICINE

## 2025-02-03 PROCEDURE — G0463 HOSPITAL OUTPT CLINIC VISIT: HCPCS | Performed by: EMERGENCY MEDICINE

## 2025-02-03 PROCEDURE — 1159F MED LIST DOCD IN RCRD: CPT | Performed by: EMERGENCY MEDICINE

## 2025-02-03 NOTE — PROGRESS NOTES
Chief Complaint  Wound Check (Follow-up on bilateral leg wounds (BS:400 ON 02/02/2025))    Subjective      History of Present Illness    Isha Llanes  is a 59 y.o. female     History of Present Illness  The patient is a 59-year-old female here for reevaluation of wounds on her lower extremities.    She has been experiencing pruritus in her arms, which she attributes to her steroid medication. She describes the sensation as akin to insects crawling under her skin so she ends up picking at her skin. She says she has been applying an antibiotic ointment to her leg wounds but has not been covering or wrapping them due to a lack of supplies.  She reports that the supplies that were ordered for her never arrived but she did not notify us of that unfortunately.  She has been elevating her legs when she can and is currently on Lasix, prescribed by her primary care physician, Dr. Salas.  She says she does not really like taking the Lasix and wonders if she should continue it or not.    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 She had a FNA biopsy about a week ago of a lesion overlying her left mandible and has not received the results yet. She is scheduled to see her ENT doctor on Wednesday, 02/05/2025, and is supposed to get the results then. She is also seeing radiation oncology and hematology oncology on the same day. She has been on 80 mg of prednisone for the swelling in her larynx. She reports that her throat remains sore.    SOCIAL HISTORY  The patient admits to smoking a pack a day, down from 3 packs.      Allergies:  Amoxicillin, Ceclor [cefaclor], Penicillins, Sulfa antibiotics, Valium [diazepam], Ambien [zolpidem], Aspirin, Ativan [lorazepam], Benadryl [diphenhydramine], Biaxin [clarithromycin], Cephalexin, Clindamycin, Compazine [prochlorperazine edisylate], Contrast dye (echo or unknown ct/mr), Doxycycline, Nsaids, Phenergan [promethazine hcl], Promethazine, and  Vancomycin      Current Outpatient Medications:     acetaminophen (Tylenol) 325 MG tablet, Take 1 tablet by mouth Every 6 (Six) Hours As Needed for Mild Pain, Headache or Fever., Disp: , Rfl:     albuterol sulfate  (90 Base) MCG/ACT inhaler, Inhale 2 puffs Every 4 (Four) Hours As Needed for Wheezing., Disp: 18 g, Rfl: 2    ALPRAZolam (XANAX) 0.25 MG tablet, Take 2 tablets by mouth 2 (Two) Times a Day As Needed., Disp: , Rfl:     cetirizine (zyrTEC) 10 MG tablet, Take 1 tablet by mouth Daily. (Patient not taking: Reported on 12/23/2024), Disp: , Rfl:     clopidogrel (PLAVIX) 75 MG tablet, Take 1 tablet by mouth Daily. (Patient not taking: Reported on 12/23/2024), Disp: 90 tablet, Rfl: 2    mupirocin (BACTROBAN) 2 % ointment, Apply 1 Application topically to the appropriate area as directed 3 (Three) Times a Day., Disp: 30 g, Rfl: 2    naloxone (NARCAN) 4 MG/0.1ML nasal spray, Call 911. Don't prime. [place] in 1 nostril for overdose. Repeat in 2-3 minutes in other nostril if no or minimal breathing/responsiveness., Disp: 2 each, Rfl: 0    pain patient supplied pump, by Intrathecal route Continuous. Type of Medication: Hydromorphone 15 mg/ml and Bupivacaine 0.5 mg/ml [employer] [phone] Provider:Dr. Boudreaux Provider number: [phone] Basal Dose: Hydromorphone 7.518 mg/day Bupivacaine 0.76485 mg/day Pump capacity: 40ml ( MAX of Hydromorphone 9.456 mg/ day; Bupivacaine 0.03685 mg /day) Bolus Dose:Hydromorphone 0.500 mg, Bupivacaine 0.93951 mg Max activations: 4 per day Lockout 3 hours. 1 Bolus per every 3 hours  Next refill-  01/07/2025 Alarm date- 01/12/2025 Pump manufacture:"[employer] Disp: , Rfl:     predniSONE (DELTASONE) 20 MG tablet, Take 2 tablets by mouth 2 (Two) Times a Day., Disp: 120 tablet, Rfl: 1    Past Medical History:   Diagnosis Date    Anesthesia     REPORTS HAS GOT REAL EMOTIONAL AND HAS BECOME ANGRY BEFORE    Anxiety     Aortic stenosis, severe     HAS BIO VALVE REPLACED    Arthritis      Asthma     Back pain     Blood clotting disorder     Cancer     Cancer associated pain     HODGKINS LYMPHOMA    CHF (congestive heart failure)     Chronic low back pain with sciatica     Chronic pain disorder     COPD (chronic obstructive pulmonary disease)     Coronary artery disease     Diabetes mellitus     TYPE 2    Diabetes mellitus     Dyspnea on effort     GERD (gastroesophageal reflux disease)     H/O lumpectomy     H/O: hysterectomy     Hiatal hernia     History of degenerative disc disease     History of kidney stones     History of pulmonary embolus (PE)     History of transfusion     AS A CHILD NO TRANSFUSION REACTION    History of transfusion     Hodgkin's lymphoma     5/19/23  5 YEARS SINCE LAST CHEMO TX    Hypertension     Immobility     Liver disease     DENIES ANY CURRENT ISSUES    Lupus     Mitral valve prolapse     Nausea vomiting and diarrhea 3/7/2024    Panic disorder     Pelvic pain     Peripheral neuropathy     Personal history of DVT (deep vein thrombosis)     Presence of intrathecal pump     PTSD (post-traumatic stress disorder)     Sacroiliac joint disease     SI (sacroiliac) joint inflammation     Sleep apnea     Venous insufficiency      Past Surgical History:   Procedure Laterality Date    ABDOMINAL SURGERY      BACK SURGERY      SPINAL FUSION     BACK SURGERY      BLADDER REPAIR      CARDIAC CATHETERIZATION N/A 03/06/2019    Procedure: Valvuloplasty;  Surgeon: Wayne Johnson MD;  Location: [facility] INVASIVE LOCATION;  Service: Cardiology    CARDIAC CATHETERIZATION      CARDIAC CATHETERIZATION Left 5/31/2023    Procedure: Cardiac Catheterization/Vascular Study;  Surgeon: Poncho Reid MD;  Location: [facility] CATH INVASIVE LOCATION;  Service: Cardiovascular;  Laterality: Left;    CARDIAC SURGERY      CARDIAC VALVE REPLACEMENT  2021    CHOLECYSTECTOMY      COLONOSCOPY N/A 12/29/2021    Procedure: COLONOSCOPY;  Surgeon: Karine Orlando MD;  Location: [facility]  ENDOSCOPY;  Service: Gastroenterology;  Laterality: N/A;  POOR PREP    COLONOSCOPY N/A 04/13/2022    Procedure: COLONOSCOPY WITH POLYPECTOMY;  Surgeon: Karine Orlando MD;  Location: [facility] ENDOSCOPY;  Service: Gastroenterology;  Laterality: N/A;  COLON POLYP, HEMORRHOIDS, POOR PREP    COLONOSCOPY      DIRECT LARYNGOSCOPY, ESOPHAGOSCOPY, BRONCHOSCOPY N/A 5/22/2023    Procedure: DIRECT LARYNGOSCOPY WITH BIOPSIES , ESOPHAGOSCOPY, BRONCHOSCOPY;  Surgeon: Ronnie Mcgarry MD;  Location: [facility] OR OSC;  Service: ENT;  Laterality: N/A;    ENDOSCOPY N/A 12/29/2021    Procedure: ESOPHAGOGASTRODUODENOSCOPY;  Surgeon: Karine Orlando MD;  Location: [facility] ENDOSCOPY;  Service: Gastroenterology;  Laterality: N/A;  ESOPHAGITIS, GASTRITIS, HIATAL HERNIA    ENDOSCOPY      ENDOSCOPY N/A 4/16/2024    Procedure: ESOPHAGOGASTRODUODENOSCOPY WITH BIOPSIES;  Surgeon: Karine Orlando MD;  Location: [facility] ENDOSCOPY;  Service: Gastroenterology;  Laterality: N/A;  ESOPHAGITIS, HIATAL HERNIA    ENDOSCOPY W/ PEG TUBE PLACEMENT N/A 12/10/2024    Procedure: ESOPHAGOGASTRODUODENOSCOPY WITH PERCUTANEOUS ENDOSCOPIC GASTROSTOMY TUBE INSERTION WITH ANESTHESIA;  Surgeon: Rodger Ortega MD;  Location: [facility] ENDOSCOPY;  Service: Gastroenterology;  Laterality: N/A;  PEG TUBE INSERTION    HYSTERECTOMY      LYMPHADENECTOMY      PAIN PUMP INSERTION/REVISION N/A 10/18/2019    Procedure: PAIN PUMP INSERTION [facility] 10-18-19 [place];  Surgeon: Horace Rios MD;  Location: [facility] OR;  Service: Pain Management    PAIN PUMP INSERTION/REVISION N/A 11/23/2020    Procedure: pain pump removal;  Surgeon: Horace Rios MD;  Location: [facility] OR;  Service: Pain Management;  Laterality: N/A;    PEG TUBE INSERTION N/A 7/26/2023    Procedure: PERCUTANEOUS ENDOSCOPIC GASTROSTOMY TUBE INSERTION;  Surgeon: Kody Mercer MD;  Location: [facility] ENDOSCOPY;  Service: General;  Laterality: N/A;  SUCCESSFUL PEG TUBE PLACE  PLACEMENT    PORTACATH PLACEMENT      IN LEFT CHEST REPORTS THAT IT IS NOT FUNCTIONING    SKIN BIOPSY      TONSILLECTOMY      TUBAL ABDOMINAL LIGATION      TUMOR REMOVAL       Social History     Socioeconomic History    Marital status: Legally    Tobacco Use    Smoking status: Every Day     Current packs/day: 1.50     Average packs/day: 1.5 packs/day for 46.1 years (69.1 ttl pk-yrs)     Types: Cigarettes     Start date: 1979     Passive exposure: Current    Smokeless tobacco: Never   Vaping Use    Vaping status: Never Used   Substance and Sexual Activity    Alcohol use: Never    Drug use: Never    Sexual activity: Not Currently           Objective     Vitals:    02/03/25 1114   BP: 113/80   BP Location: Right arm   Patient Position: Sitting   Cuff Size: Adult   Pulse: 112   Resp: 18  Comment: O2: 93% ON RA   Temp: 98.2 °F (36.8 °C)   TempSrc: Oral   PainSc: 0-No pain     There is no height or weight on file to calculate BMI.    STEADI Fall Risk Assessment has not been completed.     Review of Systems     ROS:  Per HPI.     I have reviewed the HPI and ROS as documented by MA/RN. Chelo Villeda MD    Physical Exam     NAD  AAOx3, pleasant, cooperative  Hoarse voice, chronic    Multiple scattered open wounds BLE with moderate edema and venous stasis noted.  Mild clear serous drainage from the 2 larger wounds on the right is noted.    Few scattered excoriations and shallow open wounds BUE right greater than left.  No evidence of infection of any of the wounds.    RLE:                LLE:            RUE:    LUE:        Result Review :  The following data was reviewed by: Chelo Villeda MD on 02/03/2025:    Prior notes and images.               Assessment and Plan   Diagnoses and all orders for this visit:    1. Venous stasis of both lower extremities (Primary)    2. Open wound of right lower extremity, subsequent encounter    3. Open wound of left lower extremity, subsequent encounter    4. Squamous cell  carcinoma of larynx    5. Tobacco dependence due to cigarettes    6. Uncontrolled type 2 diabetes mellitus with hyperglycemia        Assessment & Plan  1. Lower extremity wounds.  The wounds do not exhibit signs of infection but appear to be irritated. A few wounds are still exuding clear fluid. There is moderate edema observed in the legs. She is advised to avoid scratching the wounds on her arms and legs.  Her arms appear quite dry and there are few scattered excoriations. The use of an aggressive moisturizer lotion is recommended. She is encouraged to continue using Lasix to manage swelling and improve wound healing. If she experiences any side effects from Lasix, she should consult her primary care physician for potential adjustments to her diuretic regimen. Aquacel AG with silver will be applied to the lower extremity wounds, which will then be wrapped with Ace wraps. This process should be repeated daily. She is also advised to keep her legs elevated. Vaseline or Aquaphor will be used on her arms to help heal the excoriations and improve the dry skin.  She should do this once or twice a day and is advised to avoid scratching or picking at any of her wounds to allow them to heal.    2. Laryngeal cancer.  She reports hoarseness and a sore throat, with a recent left jaw FNA performed about a week ago by her ENT. She has an upcoming appointment on 02/05/2025 with her ENT doctor, radiation oncology, and hematology oncology at [facility] in [place]. She is advised to follow up with her specialists as scheduled.    3.  Diabetes.  Patient with very poorly controlled type 2 diabetes.  She is encouraged to work on improving her diabetic control to aid healing and overall health.    4.  Tobacco dependence.  Patient continues to smoke a pack of cigarettes per day, down from 3.  She is strongly encouraged to continue cutting back and work on complete smoking cessation.    Follow-up  The patient will follow up in 6 weeks,  or earlier if necessary.      Patient was given instructions and counseling regarding their condition or for health maintenance advice, as well as the wound care plan and recommendations. They understand and agree with the plan.  They will follow back up here in the clinic but are instructed to contact us in the interim should they have any new, returning, or worsening symptoms or concerns. Please see specific information pulled into the AVS if appropriate.     Dragon Dictation utilized for chart completion.    I spent 32 minutes in both face-to-face and nonface-to-face time for patient care today including, but not limited to, review of old records, reviewing old images, history and physical exam, reviewing test results, counseling patient, entering orders, coordinating care, and documenting.      Follow Up   Return in about 6 weeks (around 3/17/2025).      Chelo Villeda MD    Patient or patient representative verbalized consent for the use of Ambient Listening during the visit with  Chelo Villeda MD for chart documentation. 2/3/2025  11:51 EST

## 2025-02-04 ENCOUNTER — HOSPITAL ENCOUNTER (OUTPATIENT)
Dept: PET IMAGING | Facility: HOSPITAL | Age: 60
Discharge: HOME | End: 2025-02-04
Payer: COMMERCIAL

## 2025-02-04 ENCOUNTER — HOSPITAL ENCOUNTER (OUTPATIENT)
Dept: CARDIOLOGY | Facility: HOSPITAL | Age: 60
Discharge: HOME | End: 2025-02-04
Payer: COMMERCIAL

## 2025-02-04 DIAGNOSIS — I70.213 ATHEROSCLEROSIS OF NATIVE ARTERIES OF EXTREMITIES WITH INTERMITTENT CLAUDICATION, BILATERAL LEGS: ICD-10-CM

## 2025-02-04 DIAGNOSIS — I70.238 ATHSCL NATV ART OF RIGHT LEG W ULCER OTH PRT LOWER RIGHT LEG: ICD-10-CM

## 2025-02-04 DIAGNOSIS — I70.248 ATHEROSCLEROSIS OF NATIVE ARTERY OF BOTH LOWER EXTREMITIES WITH BILATERAL ULCERATION OF OTHER PART OF LOWER LEGS: ICD-10-CM

## 2025-02-04 DIAGNOSIS — C32.9 LARYNGEAL CANCER: ICD-10-CM

## 2025-02-04 DIAGNOSIS — I70.238 ATHEROSCLEROSIS OF NATIVE ARTERY OF BOTH LOWER EXTREMITIES WITH BILATERAL ULCERATION OF OTHER PART OF LOWER LEGS: ICD-10-CM

## 2025-02-04 LAB
BH CV LOWER ARTERIAL LEFT ABI RATIO: 1.19
BH CV LOWER ARTERIAL LEFT DORSALIS PEDIS SYS MAX: 126
BH CV LOWER ARTERIAL LEFT GREAT TOE SYS MAX: 106
BH CV LOWER ARTERIAL LEFT POST TIBIAL SYS MAX: 135
BH CV LOWER ARTERIAL LEFT TBI RATIO: 106
BH CV LOWER ARTERIAL RIGHT ABI RATIO: 1.14
BH CV LOWER ARTERIAL RIGHT DORSALIS PEDIS SYS MAX: 129
BH CV LOWER ARTERIAL RIGHT GREAT TOE SYS MAX: 110
BH CV LOWER ARTERIAL RIGHT POST TIBIAL SYS MAX: 122
BH CV LOWER ARTERIAL RIGHT TBI RATIO: 110
BH CV UPPER ARTERIAL LEFT 1ST DIGIT SYS MAX: 149
BH CV UPPER ARTERIAL LEFT FBI 1ST DIGIT RATIO: 1.32
BH CV UPPER ARTERIAL LEFT WBI RATIO: 1.13
BH CV UPPER ARTERIAL RIGHT 1ST DIGIT SYS MAX: 146
BH CV UPPER ARTERIAL RIGHT FBI 1ST DIGIT RATIO: 1.29
BH CV UPPER ARTERIAL RIGHT WBI RATIO: 1.23
UPPER ARTERIAL LEFT ARM BRACHIAL SYS MAX: 109
UPPER ARTERIAL LEFT ARM BRACHIAL SYS MAX: 109
UPPER ARTERIAL LEFT ARM RADIAL SYS MAX: 125
UPPER ARTERIAL LEFT ARM ULNAR SYS MAX: 128
UPPER ARTERIAL RIGHT ARM BRACHIAL SYS MAX: 113
UPPER ARTERIAL RIGHT ARM BRACHIAL SYS MAX: 113
UPPER ARTERIAL RIGHT ARM RADIAL SYS MAX: 123
UPPER ARTERIAL RIGHT ARM ULNAR SYS MAX: 133

## 2025-02-04 PROCEDURE — 93922 UPR/L XTREMITY ART 2 LEVELS: CPT

## 2025-02-05 ENCOUNTER — TRANSCRIBE ORDERS (OUTPATIENT)
Dept: ADMINISTRATIVE | Facility: HOSPITAL | Age: 60
End: 2025-02-05
Payer: COMMERCIAL

## 2025-02-05 DIAGNOSIS — C32.9 LARYNGEAL CANCER: Primary | ICD-10-CM

## 2025-02-08 ENCOUNTER — APPOINTMENT (OUTPATIENT)
Dept: GENERAL RADIOLOGY | Facility: HOSPITAL | Age: 60
End: 2025-02-08
Payer: COMMERCIAL

## 2025-02-08 ENCOUNTER — HOSPITAL ENCOUNTER (EMERGENCY)
Facility: HOSPITAL | Age: 60
Discharge: OTHER INSTITUTION | End: 2025-02-08
Attending: EMERGENCY MEDICINE
Payer: COMMERCIAL

## 2025-02-08 VITALS
HEIGHT: 66 IN | HEART RATE: 126 BPM | WEIGHT: 140.65 LBS | DIASTOLIC BLOOD PRESSURE: 66 MMHG | OXYGEN SATURATION: 90 % | TEMPERATURE: 101 F | SYSTOLIC BLOOD PRESSURE: 122 MMHG | BODY MASS INDEX: 22.6 KG/M2 | RESPIRATION RATE: 24 BRPM

## 2025-02-08 DIAGNOSIS — J44.1 ACUTE EXACERBATION OF CHRONIC OBSTRUCTIVE PULMONARY DISEASE (COPD): ICD-10-CM

## 2025-02-08 DIAGNOSIS — C32.9 LARYNGEAL CANCER: ICD-10-CM

## 2025-02-08 DIAGNOSIS — J96.02 ACUTE RESPIRATORY FAILURE WITH HYPOXIA AND HYPERCAPNIA: ICD-10-CM

## 2025-02-08 DIAGNOSIS — J96.01 ACUTE RESPIRATORY FAILURE WITH HYPOXIA AND HYPERCAPNIA: ICD-10-CM

## 2025-02-08 DIAGNOSIS — J10.1 INFLUENZA A: Primary | ICD-10-CM

## 2025-02-08 LAB
ALBUMIN SERPL-MCNC: 3.6 G/DL (ref 3.5–5.2)
ALBUMIN/GLOB SERPL: 1.3 G/DL
ALP SERPL-CCNC: 59 U/L (ref 39–117)
ALT SERPL W P-5'-P-CCNC: 21 U/L (ref 1–33)
ANION GAP SERPL CALCULATED.3IONS-SCNC: 6.3 MMOL/L (ref 5–15)
ARTERIAL PATENCY WRIST A: ABNORMAL
AST SERPL-CCNC: 17 U/L (ref 1–32)
ATMOSPHERIC PRESS: 742 MMHG
BASE EXCESS BLDA CALC-SCNC: 7.6 MMOL/L (ref -2–2)
BASOPHILS # BLD AUTO: 0.01 10*3/MM3 (ref 0–0.2)
BASOPHILS NFR BLD AUTO: 0.2 % (ref 0–1.5)
BDY SITE: ABNORMAL
BILIRUB SERPL-MCNC: 0.2 MG/DL (ref 0–1.2)
BUN SERPL-MCNC: 15 MG/DL (ref 6–20)
BUN/CREAT SERPL: 30.6 (ref 7–25)
CALCIUM SPEC-SCNC: 8.9 MG/DL (ref 8.6–10.5)
CHLORIDE SERPL-SCNC: 97 MMOL/L (ref 98–107)
CO2 SERPL-SCNC: 33.7 MMOL/L (ref 22–29)
CREAT SERPL-MCNC: 0.49 MG/DL (ref 0.57–1)
DEPRECATED RDW RBC AUTO: 50.1 FL (ref 37–54)
EGFRCR SERPLBLD CKD-EPI 2021: 108.7 ML/MIN/1.73
EOSINOPHIL # BLD AUTO: 0 10*3/MM3 (ref 0–0.4)
EOSINOPHIL NFR BLD AUTO: 0 % (ref 0.3–6.2)
ERYTHROCYTE [DISTWIDTH] IN BLOOD BY AUTOMATED COUNT: 15.3 % (ref 12.3–15.4)
FLUAV SUBTYP SPEC NAA+PROBE: DETECTED
FLUBV RNA ISLT QL NAA+PROBE: NOT DETECTED
GAS FLOW AIRWAY: 2 LPM
GEN 5 1HR TROPONIN T REFLEX: 41 NG/L
GLOBULIN UR ELPH-MCNC: 2.7 GM/DL
GLUCOSE SERPL-MCNC: 214 MG/DL (ref 65–99)
HCO3 BLDA-SCNC: 36.1 MMOL/L (ref 22–26)
HCT VFR BLD AUTO: 40.3 % (ref 34–46.6)
HCT VFR BLD CALC: 40 % (ref 38–51)
HEMODILUTION: NO
HGB BLD-MCNC: 12.4 G/DL (ref 12–15.9)
HGB BLDA-MCNC: 13.6 G/DL (ref 12–18)
HOLD SPECIMEN: NORMAL
IMM GRANULOCYTES # BLD AUTO: 0.05 10*3/MM3 (ref 0–0.05)
IMM GRANULOCYTES NFR BLD AUTO: 1.1 % (ref 0–0.5)
INHALED O2 CONCENTRATION: 28 %
LYMPHOCYTES # BLD AUTO: 0.33 10*3/MM3 (ref 0.7–3.1)
LYMPHOCYTES NFR BLD AUTO: 7.1 % (ref 19.6–45.3)
Lab: ABNORMAL
MCH RBC QN AUTO: 27.6 PG (ref 26.6–33)
MCHC RBC AUTO-ENTMCNC: 30.8 G/DL (ref 31.5–35.7)
MCV RBC AUTO: 89.6 FL (ref 79–97)
MODALITY: ABNORMAL
MONOCYTES # BLD AUTO: 0.28 10*3/MM3 (ref 0.1–0.9)
MONOCYTES NFR BLD AUTO: 6 % (ref 5–12)
NEUTROPHILS NFR BLD AUTO: 3.99 10*3/MM3 (ref 1.7–7)
NEUTROPHILS NFR BLD AUTO: 85.6 % (ref 42.7–76)
NOTIFIED WHO: ABNORMAL
NRBC BLD AUTO-RTO: 0 /100 WBC (ref 0–0.2)
NT-PROBNP SERPL-MCNC: 761.3 PG/ML (ref 0–900)
PCO2 BLDA: 68.5 MM HG (ref 35–45)
PH BLDA: 7.33 PH UNITS (ref 7.35–7.45)
PLATELET # BLD AUTO: 121 10*3/MM3 (ref 140–450)
PMV BLD AUTO: 10.4 FL (ref 6–12)
PO2 BLD: 373 MM[HG] (ref 0–500)
PO2 BLDA: 104.5 MM HG (ref 80–100)
POTASSIUM SERPL-SCNC: 3.5 MMOL/L (ref 3.5–5.2)
PROT SERPL-MCNC: 6.3 G/DL (ref 6–8.5)
QT INTERVAL: 328 MS
QTC INTERVAL: 464 MS
RBC # BLD AUTO: 4.5 10*6/MM3 (ref 3.77–5.28)
READ BACK: YES
RSV RNA NPH QL NAA+NON-PROBE: NOT DETECTED
SAO2 % BLDCOA: 97.3 % (ref 95–99)
SARS-COV-2 RNA RESP QL NAA+PROBE: NOT DETECTED
SODIUM SERPL-SCNC: 137 MMOL/L (ref 136–145)
TROPONIN T % DELTA: -11
TROPONIN T NUMERIC DELTA: -5 NG/L
TROPONIN T SERPL HS-MCNC: 46 NG/L
WBC NRBC COR # BLD AUTO: 4.66 10*3/MM3 (ref 3.4–10.8)
WHOLE BLOOD HOLD COAG: NORMAL
WHOLE BLOOD HOLD SPECIMEN: NORMAL

## 2025-02-08 PROCEDURE — 71045 X-RAY EXAM CHEST 1 VIEW: CPT

## 2025-02-08 PROCEDURE — 93005 ELECTROCARDIOGRAM TRACING: CPT

## 2025-02-08 PROCEDURE — 25010000002 LORAZEPAM PER 2 MG: Performed by: EMERGENCY MEDICINE

## 2025-02-08 PROCEDURE — 99285 EMERGENCY DEPT VISIT HI MDM: CPT

## 2025-02-08 PROCEDURE — 84484 ASSAY OF TROPONIN QUANT: CPT | Performed by: EMERGENCY MEDICINE

## 2025-02-08 PROCEDURE — 80053 COMPREHEN METABOLIC PANEL: CPT | Performed by: EMERGENCY MEDICINE

## 2025-02-08 PROCEDURE — 25010000002 METHYLPREDNISOLONE PER 125 MG: Performed by: EMERGENCY MEDICINE

## 2025-02-08 PROCEDURE — 36415 COLL VENOUS BLD VENIPUNCTURE: CPT

## 2025-02-08 PROCEDURE — 87637 SARSCOV2&INF A&B&RSV AMP PRB: CPT | Performed by: EMERGENCY MEDICINE

## 2025-02-08 PROCEDURE — 94799 UNLISTED PULMONARY SVC/PX: CPT

## 2025-02-08 PROCEDURE — 83880 ASSAY OF NATRIURETIC PEPTIDE: CPT | Performed by: EMERGENCY MEDICINE

## 2025-02-08 PROCEDURE — 96375 TX/PRO/DX INJ NEW DRUG ADDON: CPT

## 2025-02-08 PROCEDURE — 36600 WITHDRAWAL OF ARTERIAL BLOOD: CPT

## 2025-02-08 PROCEDURE — 93005 ELECTROCARDIOGRAM TRACING: CPT | Performed by: EMERGENCY MEDICINE

## 2025-02-08 PROCEDURE — 96374 THER/PROPH/DIAG INJ IV PUSH: CPT

## 2025-02-08 PROCEDURE — 94660 CPAP INITIATION&MGMT: CPT

## 2025-02-08 PROCEDURE — 94761 N-INVAS EAR/PLS OXIMETRY MLT: CPT

## 2025-02-08 PROCEDURE — 85025 COMPLETE CBC W/AUTO DIFF WBC: CPT | Performed by: EMERGENCY MEDICINE

## 2025-02-08 PROCEDURE — 94640 AIRWAY INHALATION TREATMENT: CPT

## 2025-02-08 PROCEDURE — 82803 BLOOD GASES ANY COMBINATION: CPT

## 2025-02-08 RX ORDER — METHYLPREDNISOLONE SODIUM SUCCINATE 125 MG/2ML
125 INJECTION, POWDER, LYOPHILIZED, FOR SOLUTION INTRAMUSCULAR; INTRAVENOUS ONCE
Status: COMPLETED | OUTPATIENT
Start: 2025-02-08 | End: 2025-02-08

## 2025-02-08 RX ORDER — LORAZEPAM 2 MG/ML
0.5 INJECTION INTRAMUSCULAR ONCE
Status: COMPLETED | OUTPATIENT
Start: 2025-02-08 | End: 2025-02-08

## 2025-02-08 RX ORDER — OSELTAMIVIR PHOSPHATE 75 MG/1
75 CAPSULE ORAL EVERY 12 HOURS SCHEDULED
Status: DISCONTINUED | OUTPATIENT
Start: 2025-02-08 | End: 2025-02-08 | Stop reason: HOSPADM

## 2025-02-08 RX ORDER — SODIUM CHLORIDE 0.9 % (FLUSH) 0.9 %
10 SYRINGE (ML) INJECTION AS NEEDED
Status: DISCONTINUED | OUTPATIENT
Start: 2025-02-08 | End: 2025-02-08 | Stop reason: HOSPADM

## 2025-02-08 RX ORDER — LORAZEPAM 2 MG/ML
1 INJECTION INTRAMUSCULAR ONCE
Status: DISCONTINUED | OUTPATIENT
Start: 2025-02-08 | End: 2025-02-08

## 2025-02-08 RX ORDER — IPRATROPIUM BROMIDE AND ALBUTEROL SULFATE 2.5; .5 MG/3ML; MG/3ML
3 SOLUTION RESPIRATORY (INHALATION) ONCE
Status: COMPLETED | OUTPATIENT
Start: 2025-02-08 | End: 2025-02-08

## 2025-02-08 RX ORDER — ACETAMINOPHEN 650 MG/1
650 SUPPOSITORY RECTAL ONCE
Status: COMPLETED | OUTPATIENT
Start: 2025-02-08 | End: 2025-02-08

## 2025-02-08 RX ADMIN — IPRATROPIUM BROMIDE AND ALBUTEROL SULFATE 3 ML: .5; 3 SOLUTION RESPIRATORY (INHALATION) at 11:34

## 2025-02-08 RX ADMIN — LORAZEPAM 0.5 MG: 2 INJECTION INTRAMUSCULAR; INTRAVENOUS at 12:51

## 2025-02-08 RX ADMIN — OSELTAMIVIR PHOSPHATE 75 MG: 75 CAPSULE ORAL at 12:00

## 2025-02-08 RX ADMIN — METHYLPREDNISOLONE SODIUM SUCCINATE 125 MG: 125 INJECTION, POWDER, FOR SOLUTION INTRAMUSCULAR; INTRAVENOUS at 12:00

## 2025-02-08 RX ADMIN — ACETAMINOPHEN 650 MG: 650 SUPPOSITORY RECTAL at 10:01

## 2025-02-08 NOTE — ED TRIAGE NOTES
"EMS reports \"We picked up patient at home. Family states everyone in the house has the flu. Patient has not been tested. She was at 88% on RA when we arrived. We applied 2L NC. She has a history of lung cancer. She is hard to understand, family believes cancer may be spreading to throat. She is a patient at [facility]. Family thought we were going to take her there. We explained we can't do that and if there is a transfer, it will be executed by the physician.\"   "

## 2025-02-08 NOTE — ED PROVIDER NOTES
Time: 11:36 AM EST  Date of encounter:  2/8/2025  Independent Historian/Clinical History and Information was obtained by:   Family    History is limited by: Acuity of Condition    Chief Complaint: Shortness of breath      History of Present Illness:  Patient is a 59 y.o. year old female who presents to the emergency department for evaluation of shortness of breath and fever.  Patient brought in with her daughter reports everyone in the house has had influenza A.  Patient also has a history of laryngeal cancer.      Patient Care Team  Primary Care Provider: Virgil Salas MD    Past Medical History:     Allergies   Allergen Reactions    Amoxicillin Shortness Of Breath     Has tolerated Cefazolin, Ceftriaxone, and Cefepime -Jermaine Melida, RP    Ceclor [Cefaclor] Shortness Of Breath     Has tolerated Cefazolin, Ceftriaxone, and Cefepime -Jermaine Melida, [facility]      Penicillins Shortness Of Breath     Has tolerated Cefazolin, Ceftriaxone, and Cefepime -Jermaine Melida, [facility]    Sulfa Antibiotics Rash    Valium [Diazepam] Mental Status Change     DEPRESSED    Ambien [Zolpidem] Mental Status Change    Aspirin GI Intolerance    Ativan [Lorazepam] Mental Status Change    Benadryl [Diphenhydramine] Anxiety     AND MAKES HER HYPER    Biaxin [Clarithromycin] Unknown - Low Severity    Cephalexin Unknown - Low Severity    Clindamycin Unknown - Low Severity    Compazine [Prochlorperazine Edisylate] Rash    Contrast Dye (Echo Or Unknown Ct/Mr) Other (See Comments)     Caused pain in her arm    Doxycycline Rash    Nsaids GI Intolerance    Phenergan [Promethazine Hcl] GI Intolerance    Promethazine GI Intolerance    Vancomycin Itching     Past Medical History:   Diagnosis Date    Anesthesia     REPORTS HAS GOT REAL EMOTIONAL AND HAS BECOME ANGRY BEFORE    Anxiety     Aortic stenosis, severe     HAS BIO VALVE REPLACED    Arthritis     Asthma     Back pain     Blood clotting disorder     Cancer     Cancer associated pain      HODGKINS LYMPHOMA    CHF (congestive heart failure)     Chronic low back pain with sciatica     Chronic pain disorder     COPD (chronic obstructive pulmonary disease)     Coronary artery disease     Diabetes mellitus     TYPE 2    Diabetes mellitus     Dyspnea on effort     GERD (gastroesophageal reflux disease)     H/O lumpectomy     H/O: hysterectomy     Hiatal hernia     History of degenerative disc disease     History of kidney stones     History of pulmonary embolus (PE)     History of transfusion     AS A CHILD NO TRANSFUSION REACTION    History of transfusion     Hodgkin's lymphoma     5/19/23  5 YEARS SINCE LAST CHEMO TX    Hypertension     Immobility     Liver disease     DENIES ANY CURRENT ISSUES    Lupus     Mitral valve prolapse     Nausea vomiting and diarrhea 3/7/2024    Panic disorder     Pelvic pain     Peripheral neuropathy     Personal history of DVT (deep vein thrombosis)     Presence of intrathecal pump     PTSD (post-traumatic stress disorder)     Sacroiliac joint disease     SI (sacroiliac) joint inflammation     Sleep apnea     Venous insufficiency      Past Surgical History:   Procedure Laterality Date    ABDOMINAL SURGERY      BACK SURGERY      SPINAL FUSION     BACK SURGERY      BLADDER REPAIR      CARDIAC CATHETERIZATION N/A 03/06/2019    Procedure: Valvuloplasty;  Surgeon: Wayne Johnson MD;  Location: [facility] INVASIVE LOCATION;  Service: Cardiology    CARDIAC CATHETERIZATION      CARDIAC CATHETERIZATION Left 5/31/2023    Procedure: Cardiac Catheterization/Vascular Study;  Surgeon: Poncho Reid MD;  Location: [facility] CATH INVASIVE LOCATION;  Service: Cardiovascular;  Laterality: Left;    CARDIAC SURGERY      CARDIAC VALVE REPLACEMENT  2021    CHOLECYSTECTOMY      COLONOSCOPY N/A 12/29/2021    Procedure: COLONOSCOPY;  Surgeon: Karine Orlando MD;  Location: [facility] ENDOSCOPY;  Service: Gastroenterology;  Laterality: N/A;  POOR PREP    COLONOSCOPY N/A 04/13/2022     Procedure: COLONOSCOPY WITH POLYPECTOMY;  Surgeon: Karine Orlando MD;  Location: [facility] ENDOSCOPY;  Service: Gastroenterology;  Laterality: N/A;  COLON POLYP, HEMORRHOIDS, POOR PREP    COLONOSCOPY      DIRECT LARYNGOSCOPY, ESOPHAGOSCOPY, BRONCHOSCOPY N/A 5/22/2023    Procedure: DIRECT LARYNGOSCOPY WITH BIOPSIES , ESOPHAGOSCOPY, BRONCHOSCOPY;  Surgeon: Ronnie Mcgarry MD;  Location: [facility] OR OSC;  Service: ENT;  Laterality: N/A;    ENDOSCOPY N/A 12/29/2021    Procedure: ESOPHAGOGASTRODUODENOSCOPY;  Surgeon: Karine Orlando MD;  Location: [facility] ENDOSCOPY;  Service: Gastroenterology;  Laterality: N/A;  ESOPHAGITIS, GASTRITIS, HIATAL HERNIA    ENDOSCOPY      ENDOSCOPY N/A 4/16/2024    Procedure: ESOPHAGOGASTRODUODENOSCOPY WITH BIOPSIES;  Surgeon: Karine Orlando MD;  Location: [facility] ENDOSCOPY;  Service: Gastroenterology;  Laterality: N/A;  ESOPHAGITIS, HIATAL HERNIA    ENDOSCOPY W/ PEG TUBE PLACEMENT N/A 12/10/2024    Procedure: ESOPHAGOGASTRODUODENOSCOPY WITH PERCUTANEOUS ENDOSCOPIC GASTROSTOMY TUBE INSERTION WITH ANESTHESIA;  Surgeon: Rodger Ortega MD;  Location: [facility] ENDOSCOPY;  Service: Gastroenterology;  Laterality: N/A;  PEG TUBE INSERTION    HYSTERECTOMY      LYMPHADENECTOMY      PAIN PUMP INSERTION/REVISION N/A 10/18/2019    Procedure: PAIN PUMP INSERTION [facility] 10-18-19 [place];  Surgeon: Horace Rios MD;  Location: [facility];  Service: Pain Management    PAIN PUMP INSERTION/REVISION N/A 11/23/2020    Procedure: pain pump removal;  Surgeon: Horace Rios MD;  Location: [facility];  Service: Pain Management;  Laterality: N/A;    PEG TUBE INSERTION N/A 7/26/2023    Procedure: PERCUTANEOUS ENDOSCOPIC GASTROSTOMY TUBE INSERTION;  Surgeon: Kody Mercer MD;  Location: [facility] ENDOSCOPY;  Service: General;  Laterality: N/A;  SUCCESSFUL PEG TUBE PLACE PLACEMENT    PORTACATH PLACEMENT      IN LEFT CHEST REPORTS THAT IT IS NOT FUNCTIONING    SKIN  BIOPSY      TONSILLECTOMY      TUBAL ABDOMINAL LIGATION      TUMOR REMOVAL       Family History   Problem Relation Age of Onset    Heart failure Mother     Anxiety disorder Mother     Prostate cancer Father     Depression Sister     Bipolar disorder Sister     Pancreatic cancer Sister     ADD / ADHD Brother     Thyroid cancer Maternal Grandmother     Pancreatic cancer Paternal Grandmother     ADD / ADHD Grandson     ADD / ADHD Granddaughter     ADD / ADHD Nephew     Malig Hyperthermia Neg Hx        Home Medications:  Prior to Admission medications    Medication Sig Start Date End Date Taking? Authorizing Provider   acetaminophen (Tylenol) 325 MG tablet Take 1 tablet by mouth Every 6 (Six) Hours As Needed for Mild Pain, Headache or Fever.    Nette Jiménez MD   albuterol sulfate  (90 Base) MCG/ACT inhaler Inhale 2 puffs Every 4 (Four) Hours As Needed for Wheezing. 11/20/23   Katia Wright MD   ALPRAZolam (XANAX) 0.25 MG tablet Take 2 tablets by mouth 2 (Two) Times a Day As Needed. 11/25/24   Ntete Jiménez MD   cetirizine (zyrTEC) 10 MG tablet Take 1 tablet by mouth Daily.  Patient not taking: Reported on 12/23/2024    Nette Jiménez MD   clopidogrel (PLAVIX) 75 MG tablet Take 1 tablet by mouth Daily.  Patient not taking: Reported on 12/23/2024 8/7/24   Katia Wright MD   mupirocin (BACTROBAN) 2 % ointment Apply 1 Application topically to the appropriate area as directed 3 (Three) Times a Day. 4/10/24   Katia Wright MD   naloxone (NARCAN) 4 MG/0.1ML nasal spray Call 911. Don't prime. Marvin in 1 nostril for overdose. Repeat in 2-3 minutes in other nostril if no or minimal breathing/responsiveness. 12/12/24   Ruperto Abdi MD   pain patient supplied pump by Intrathecal route Continuous. Type of Medication: Hydromorphone 15 mg/ml and Bupivacaine 0.5 mg/ml  [employer] [phone]  Provider:Dr. Boudreaux  Provider number: [phone]  Basal Dose: Hydromorphone 7.518 mg/day Bupivacaine 0.82247  "mg/day  Pump capacity: 40ml  ( MAX of Hydromorphone 9.456 mg/ day; Bupivacaine 0.89589 mg /day)  Bolus Dose:Hydromorphone 0.500 mg, Bupivacaine 0.76663 mg  Max activations: 4 per day  Lockout 3 hours. 1 Bolus per every 3 hours   Next refill-  01/07/2025  Alarm date- 01/12/2025  Pump manufacture:MedManny Escobar MD   predniSONE (DELTASONE) 20 MG tablet Take 2 tablets by mouth 2 (Two) Times a Day. 12/23/24   Goyo Nunez MD        Social History:   Social History     Tobacco Use    Smoking status: Every Day     Current packs/day: 1.50     Average packs/day: 1.5 packs/day for 46.1 years (69.2 ttl pk-yrs)     Types: Cigarettes     Start date: 1979     Passive exposure: Current    Smokeless tobacco: Never   Vaping Use    Vaping status: Never Used   Substance Use Topics    Alcohol use: Never    Drug use: Never         Review of Systems:  Review of Systems   Unable to perform ROS: Acuity of condition        Physical Exam:  /66   Pulse (!) 130   Temp (!) 101 °F (38.3 °C) (Rectal)   Resp 25   Ht 167.6 cm (66\")   Wt 63.8 kg (140 lb 10.5 oz)   LMP  (LMP Unknown)   SpO2 93%   BMI 22.70 kg/m²     Physical Exam  Vitals and nursing note reviewed.   Constitutional:       General: She is in acute distress.      Appearance: Normal appearance. She is ill-appearing. She is not toxic-appearing.   HENT:      Head: Normocephalic and atraumatic.      Jaw: There is normal jaw occlusion.   Eyes:      General: Lids are normal.      Extraocular Movements: Extraocular movements intact.      Conjunctiva/sclera: Conjunctivae normal.      Pupils: Pupils are equal, round, and reactive to light.   Cardiovascular:      Rate and Rhythm: Regular rhythm. Tachycardia present.      Pulses: Normal pulses.      Heart sounds: Normal heart sounds.   Pulmonary:      Effort: Tachypnea, accessory muscle usage and respiratory distress present.      Breath sounds: Wheezing present. No rhonchi.   Abdominal:      General: Abdomen is " flat.      Palpations: Abdomen is soft.      Tenderness: There is no abdominal tenderness. There is no guarding or rebound.   Musculoskeletal:         General: Normal range of motion.      Cervical back: Normal range of motion and neck supple.      Right lower leg: No edema.      Left lower leg: No edema.   Skin:     General: Skin is warm and dry.   Neurological:      General: No focal deficit present.      Mental Status: She is alert. Mental status is at baseline.   Psychiatric:         Mood and Affect: Mood normal.                    Medical Decision Making:      Comorbidities that affect care:    Laryngeal cancer    External Notes reviewed:    Previous Radiological Studies: Revealing laryngeal mass      The following orders were placed and all results were independently analyzed by me:  Orders Placed This Encounter   Procedures    COVID-19, FLU A/B, RSV PCR 1 HR TAT - Swab, Nasopharynx    XR Chest 1 View    [place] Draw    Comprehensive Metabolic Panel    BNP    High Sensitivity Troponin T    CBC Auto Differential    [place] Draw    High Sensitivity Troponin T 1Hr    Blood Gas, Arterial -    Blood Gas, Arterial -    NPO Diet NPO Type: Strict NPO    Undress & Gown    Vital Signs    Check Temperature    Continuous Pulse Oximetry    IP General Consult (Use specialty-specific consult if known)    Oxygen Therapy- Nasal Cannula; Titrate 1-6 LPM Per SpO2; 90 - 95%    NIPPV - Provider Settings    ECG 12 Lead ED Triage Standing Order; SOA    Insert Peripheral IV    CBC & Differential    Green Top (Gel)    Lavender Top    Gold Top - SST    Light Blue Top    Gray Top       Medications Given in the Emergency Department:  Medications   sodium chloride 0.9 % flush 10 mL (has no administration in time range)   oseltamivir (TAMIFLU) capsule 75 mg (75 mg Oral Given 2/8/25 1200)   acetaminophen (TYLENOL) suppository 650 mg (650 mg Rectal Given 2/8/25 1001)   methylPREDNISolone sodium succinate (SOLU-Medrol) injection 125 mg (125  mg Intravenous Given 2/8/25 1200)   ipratropium-albuterol (DUO-NEB) nebulizer solution 3 mL (3 mL Nebulization Given 2/8/25 1134)   LORazepam (ATIVAN) injection 0.5 mg (0.5 mg Intravenous Given 2/8/25 1251)        ED Course:         Labs:    Lab Results (last 24 hours)       Procedure Component Value Units Date/Time    COVID-19, FLU A/B, RSV PCR 1 HR TAT - Swab, Nasopharynx [[id number]]  (Abnormal) Collected: 02/08/25 0609    Specimen: Swab from Nasopharynx Updated: 02/08/25 0714     COVID19 Not Detected     Influenza A PCR Detected     Influenza B PCR Not Detected     RSV, PCR Not Detected    Narrative:      Fact sheet for providers: https://www.fda.gov/media/323948/download    Fact sheet for patients: https://www.fda.gov/media/246745/download    Test performed by PCR.    CBC & Differential [[id number]]  (Abnormal) Collected: 02/08/25 0659    Specimen: Blood Updated: 02/08/25 0743    Narrative:      The following orders were created for panel order CBC & Differential.  Procedure                               Abnormality         Status                     ---------                               -----------         ------                     CBC Auto Differential[[id number]]        Abnormal            Final result               Scan Slide[[id number]]                                                                    Please view results for these tests on the individual orders.    Comprehensive Metabolic Panel [[id number]]  (Abnormal) Collected: 02/08/25 0659    Specimen: Blood Updated: 02/08/25 0749     Glucose 214 mg/dL      BUN 15 mg/dL      Creatinine 0.49 mg/dL      Sodium 137 mmol/L      Potassium 3.5 mmol/L      Chloride 97 mmol/L      CO2 33.7 mmol/L      Calcium 8.9 mg/dL      Total Protein 6.3 g/dL      Albumin 3.6 g/dL      ALT (SGPT) 21 U/L      AST (SGOT) 17 U/L      Alkaline Phosphatase 59 U/L      Total Bilirubin 0.2 mg/dL      Globulin 2.7 gm/dL      A/G Ratio 1.3 g/dL      BUN/Creatinine Ratio 30.6      Anion Gap 6.3 mmol/L      eGFR 108.7 mL/min/1.73     Narrative:      GFR Categories in Chronic Kidney Disease (CKD)      GFR Category          GFR (mL/min/1.73)    Interpretation  G1                     90 or greater         Normal or high (1)  G2                      60-89                Mild decrease (1)  G3a                   45-59                Mild to moderate decrease  G3b                   30-44                Moderate to severe decrease  G4                    15-29                Severe decrease  G5                    14 or less           Kidney failure          (1)In the absence of evidence of kidney disease, neither GFR category G1 or G2 fulfill the criteria for CKD.    eGFR calculation 2021 CKD-EPI creatinine equation, which does not include race as a factor    BNP [[id number]]  (Normal) Collected: 02/08/25 0659    Specimen: Blood Updated: 02/08/25 0747     proBNP 761.3 pg/mL     Narrative:      This assay is used as an aid in the diagnosis of individuals suspected of having heart failure. It can be used as an aid in the diagnosis of acute decompensated heart failure (ADHF) in patients presenting with signs and symptoms of ADHF to the emergency department (ED). In addition, NT-proBNP of <300 pg/mL indicates ADHF is not likely.    Age Range Result Interpretation  NT-proBNP Concentration (pg/mL:      <50             Positive            >450                   Gray                 300-450                    Negative             <300    50-75           Positive            >900                  Gray                300-900                  Negative            <300      >75             Positive            >1800                  Gray                300-1800                  Negative            <300    High Sensitivity Troponin T [[id number]]  (Abnormal) Collected: 02/08/25 0659    Specimen: Blood Updated: 02/08/25 0749     HS Troponin T 46 ng/L     Narrative:      High Sensitive Troponin T Reference Range:  <14.0  ng/L- Negative Female for AMI  <22.0 ng/L- Negative Male for AMI  >=14 - Abnormal Female indicating possible myocardial injury.  >=22 - Abnormal Male indicating possible myocardial injury.   Clinicians would have to utilize clinical acumen, EKG, Troponin, and serial changes to determine if it is an Acute Myocardial Infarction or myocardial injury due to an underlying chronic condition.         CBC Auto Differential [[id number]]  (Abnormal) Collected: 02/08/25 0659    Specimen: Blood Updated: 02/08/25 0743     WBC 4.66 10*3/mm3      RBC 4.50 10*6/mm3      Hemoglobin 12.4 g/dL      Hematocrit 40.3 %      MCV 89.6 fL      MCH 27.6 pg      MCHC 30.8 g/dL      RDW 15.3 %      RDW-SD 50.1 fl      MPV 10.4 fL      Platelets 121 10*3/mm3      Neutrophil % 85.6 %      Lymphocyte % 7.1 %      Monocyte % 6.0 %      Eosinophil % 0.0 %      Basophil % 0.2 %      Immature Grans % 1.1 %      Neutrophils, Absolute 3.99 10*3/mm3      Lymphocytes, Absolute 0.33 10*3/mm3      Monocytes, Absolute 0.28 10*3/mm3      Eosinophils, Absolute 0.00 10*3/mm3      Basophils, Absolute 0.01 10*3/mm3      Immature Grans, Absolute 0.05 10*3/mm3      nRBC 0.0 /100 WBC     High Sensitivity Troponin T 1Hr [[id number]]  (Abnormal) Collected: 02/08/25 0844    Specimen: Blood Updated: 02/08/25 0922     HS Troponin T 41 ng/L      Troponin T Numeric Delta -5 ng/L      Troponin T % Delta -11    Narrative:      High Sensitive Troponin T Reference Range:  <14.0 ng/L- Negative Female for AMI  <22.0 ng/L- Negative Male for AMI  >=14 - Abnormal Female indicating possible myocardial injury.  >=22 - Abnormal Male indicating possible myocardial injury.   Clinicians would have to utilize clinical acumen, EKG, Troponin, and serial changes to determine if it is an Acute Myocardial Infarction or myocardial injury due to an underlying chronic condition.         Blood Gas, Arterial - [[id number]]  (Abnormal) Collected: 02/08/25 1013    Specimen: Arterial Blood Updated:  02/08/25 1018     Site Right Brachial     Shane's Test N/A     pH, Arterial 7.330 pH units      pCO2, Arterial 68.5 mm Hg      pO2, Arterial 104.5 mm Hg      HCO3, Arterial 36.1 mmol/L      Base Excess, Arterial 7.6 mmol/L      Comment: Serial Number: [id number]:  [id number]        O2 Saturation, Arterial 97.3 %      Hemoglobin, Blood Gas 13.6 g/dL      Hematocrit, Blood Gas 40.0 %      Barometric Pressure for Blood Gas 742.0000 mmHg      Modality Cannula     FIO2 28 %      Flow Rate 2.0000 lpm      Notified Who dr gilliland     Read Back Yes     Notified Time --     Hemodilution No     PO2/FIO2 373             Imaging:    XR Chest 1 View    Result Date: 2/8/2025  XR CHEST 1 VW-  Date of exam: 2/8/2025, 6:21 a.m.  Indications: SOA/SOB/shortness of air/shortness of breath.  Comparison: 2/10/2024.  FINDINGS: A single AP semi-upright portable chest radiograph was performed. No change in the left IJ (internal jugular) central venous line/port with its distal tip in the expected mid-to-lower superior vena cava (SVC). The patient has undergone transcatheter aortic valve replacement (TAVR). Chronic calcified granulomatous disease involves the chest. External artifacts obscure detail. There may be atelectasis, fibrosis, and/or infiltrate(s) in the right lung base. To a lesser extent, similar findings may involve the left lung base. There is emphysematous contour of the lungs. No cardiac enlargement is seen. No pleural effusion or pneumothorax is identified. Probably no significant interval change is seen since the prior study (or studies).       Probably no significant interval change is suggested radiographically since the 2/10/2024 study with findings, as detailed above.   Portions of this note were completed with a voice recognition program.  Electronically Signed By-Sony Hood MD On:2/8/2025 6:34 AM         Differential Diagnosis and Discussion:    Dyspnea: Differential diagnosis includes but is not limited to metabolic  acidosis, neurological disorders, psychogenic, asthma, pneumothorax, upper airway obstruction, COPD, pneumonia, noncardiogenic pulmonary edema, interstitial lung disease, anemia, congestive heart failure, and pulmonary embolism    PROCEDURES:    Labs were collected in the emergency department and all labs were reviewed and interpreted by me.  X-ray were performed in the emergency department and all X-ray impressions were independently interpreted by me.  An EKG was performed and the EKG was interpreted by me.    ECG 12 Lead ED Triage Standing Order; SOA   Preliminary Result   HEART NWCI=958  bpm   RR Ailifeyq=214  ms   KS Vfqzipef=340  ms   P Horizontal Axis=1  deg   P Front Axis=74  deg   QRSD Yrxqcebg=928  ms   QT Crifznfp=189  ms   JJpN=284  ms   QRS Axis=12  deg   T Wave Axis=142  deg   - ABNORMAL ECG -   Sinus tachycardia   Left bundle branch block   ST elevation secondary to IVCD   Date and Time of Study:2025-02-08 06:04:08        My interpretation of EKG shows sinus tachycardia, tachycardic rate, normal QT, no acute ischemia    Procedures    MDM  Number of Diagnoses or Management Options  Acute exacerbation of chronic obstructive pulmonary disease (COPD)  Acute respiratory failure with hypoxia and hypercapnia  Influenza A  Laryngeal cancer  Diagnosis management comments: In summary this is a 59-year-old female with history of laryngeal cancer who presents to the emergency department for evaluation of shortness of breath.  COVID-19, influenza, RSV testing all independently reviewed interpreted me as positive for influenza A.  CBC independently reviewed and interpreted by me and shows no critical abnormalities.  CMP independently reviewed and interpreted by me and shows no critical abnormalities.  Chest x-ray reviewed by me is unremarkable and shows no significant change.  I discussed case with patient's ENT at [facility] in [place] who recommends bringing the patient to their facility for further  laryngeal cancer workup along with influenza treatment.  Patient is requiring BiPAP at this time for oxygenation.  Steroids and breathing treatment given.             The patient presents with respiratory distress.  The patient does require supplemental oxygen.  Patient was placed on the cardiac monitor after given the problems with BiPAP.  They were monitored for ventricular ectopy, arrhythmia, tachycardia, hypoxia, and changes in blood pressure.  Continuous pulse oximetry was placed in waveform with corresponding oxygen saturation was assessed throughout their stay in the emergency department.  Patient was rechecked several times in the ED for decline in mental status and worsening distress.    Total Critical Care time of 45 minutes. Total critical care time documented does not include time spent on separately billed procedures for services of nurses or physician assistants. I personally saw and examined the patient. I have reviewed all diagnostic interpretations and treatment plans as written. I was present for the key portions of any procedures performed and the inclusive time noted in any critical care statement. Critical care time includes patient management by me, time spent at the patients bedside,  time to review lab and imaging results, discussing patient care, documentation in the medical record, and time spent with family or caregiver.          Patient Care Considerations:    CT CHEST: I considered ordering a CT scan of the chest, however chest x-ray unremarkable      Consultants/Shared Management Plan:    Transfer Provider: I have discussed the case with Dr. Bush at [facility] who agrees to accept the patient as a transfer.    Social Determinants of Health:    Patient has presented with family members who are responsible, reliable and will ensure follow up care.      Disposition and Care Coordination:    Transferred: Through independent evaluation of the patient's history,  physical, and imperical data, the patient meets criteria to be transferred to another hospital for evaluation/admission.        Final diagnoses:   Influenza A   Acute exacerbation of chronic obstructive pulmonary disease (COPD)   Acute respiratory failure with hypoxia and hypercapnia   Laryngeal cancer        ED Disposition       ED Disposition   Transfer to Another Facility     Condition   --    Comment   --               This medical record created using voice recognition software.             Sebastian Gunn MD  02/08/25 [id number]

## 2025-02-13 ENCOUNTER — HOSPITAL ENCOUNTER (OUTPATIENT)
Dept: CT IMAGING | Facility: HOSPITAL | Age: 60
Discharge: HOME | End: 2025-02-13
Payer: COMMERCIAL

## 2025-02-25 ENCOUNTER — HOSPITAL ENCOUNTER (OUTPATIENT)
Dept: PET IMAGING | Facility: HOSPITAL | Age: 60
Discharge: HOME | End: 2025-02-25
Payer: COMMERCIAL

## 2025-02-25 PROCEDURE — A9552 F18 FDG: HCPCS | Performed by: OTOLARYNGOLOGY

## 2025-02-25 PROCEDURE — 34310000005 FLUDEOXYGLUCOSE F18 SOLUTION: Performed by: OTOLARYNGOLOGY

## 2025-02-25 PROCEDURE — 78815 PET IMAGE W/CT SKULL-THIGH: CPT

## 2025-02-25 RX ADMIN — FLUDEOXYGLUCOSE F 18 1 DOSE: 200 INJECTION, SOLUTION INTRAVENOUS at 15:03

## 2025-03-05 ENCOUNTER — TELEPHONE (OUTPATIENT)
Dept: RADIATION ONCOLOGY | Facility: HOSPITAL | Age: 60
End: 2025-03-05
Payer: COMMERCIAL

## 2025-03-05 NOTE — TELEPHONE ENCOUNTER
OSW contacted Ms. Llanes this afternoon to inquire if she needs transportation arranged for her upcoming follow up visit with the radiation oncologist. Her daughter answered the phone and advised she's been undergoing cancer treatment with Dr. Cline and the [facility] team. Daughter advised she is also going to be undergoing a lung biopsy. Therefore, she inquired if Ms. Llanes needs to still follow up with the radiation oncologist on the 13th or if this needs rescheduled until after these things are completed. OSW relayed the above to the radiation oncology nurse and she will consult with the provider tomorrow when he's back in office. OSW support remains available.

## 2025-03-06 ENCOUNTER — DOCUMENTATION (OUTPATIENT)
Dept: RADIATION ONCOLOGY | Facility: HOSPITAL | Age: 60
End: 2025-03-06
Payer: COMMERCIAL

## 2025-03-06 NOTE — TELEPHONE ENCOUNTER
OSW received confirmation from radiation oncology that Ms. Llanes does not need seen on 3/13/25. The nurse will contact Ms. Llanes and her daughter to further facilitate getting the radiation oncology follow up appointment rescheduled after her upcoming procedures. OSW support remains available.

## 2025-03-06 NOTE — PROGRESS NOTES
Spoke with Jamie CHEEK for Dr. Cline.  Ms Llanes will be having a biopsy of a lymph node in her neck along with a biopsy of a lung nodule on March 11th at [facility].  Afterwards on 3/19 she will follow back up with Dr. Cline to discuss the pathology from these biopsies.  Originally scheduled to see Dr. Nunez on 3/13.  This appointment was canceled and new appointment made for follow up with Dr. Nunez for April 10th at 10 am.  Daughter,Sheila, called and given new appointment date and time. No further needs or concerns.

## 2025-03-17 ENCOUNTER — HOSPITAL ENCOUNTER (INPATIENT)
Facility: HOSPITAL | Age: 60
LOS: 1 days | Discharge: LEFT BEFORE DISCHARGE | End: 2025-03-18
Attending: EMERGENCY MEDICINE | Admitting: FAMILY MEDICINE
Payer: COMMERCIAL

## 2025-03-17 DIAGNOSIS — J96.22 ACUTE ON CHRONIC RESPIRATORY FAILURE WITH HYPOXIA AND HYPERCAPNIA: ICD-10-CM

## 2025-03-17 DIAGNOSIS — R53.1 WEAKNESS GENERALIZED: Primary | ICD-10-CM

## 2025-03-17 DIAGNOSIS — J96.21 ACUTE ON CHRONIC RESPIRATORY FAILURE WITH HYPOXIA AND HYPERCAPNIA: ICD-10-CM

## 2025-03-17 DIAGNOSIS — L03.119 CELLULITIS OF LOWER EXTREMITY, UNSPECIFIED LATERALITY: ICD-10-CM

## 2025-03-17 LAB
ALBUMIN SERPL-MCNC: 3.8 G/DL (ref 3.5–5.2)
ALBUMIN/GLOB SERPL: 1.5 G/DL
ALP SERPL-CCNC: 71 U/L (ref 39–117)
ALT SERPL W P-5'-P-CCNC: 21 U/L (ref 1–33)
ANION GAP SERPL CALCULATED.3IONS-SCNC: 11.1 MMOL/L (ref 5–15)
AST SERPL-CCNC: 15 U/L (ref 1–32)
BACTERIA UR QL AUTO: ABNORMAL /HPF
BASOPHILS # BLD AUTO: 0.03 10*3/MM3 (ref 0–0.2)
BASOPHILS NFR BLD AUTO: 0.3 % (ref 0–1.5)
BILIRUB SERPL-MCNC: 0.2 MG/DL (ref 0–1.2)
BILIRUB UR QL STRIP: NEGATIVE
BUN SERPL-MCNC: 17 MG/DL (ref 6–20)
BUN/CREAT SERPL: 28.8 (ref 7–25)
CALCIUM SPEC-SCNC: 9.2 MG/DL (ref 8.6–10.5)
CHLORIDE SERPL-SCNC: 94 MMOL/L (ref 98–107)
CLARITY UR: CLEAR
CO2 SERPL-SCNC: 27.9 MMOL/L (ref 22–29)
COLOR UR: YELLOW
CREAT SERPL-MCNC: 0.59 MG/DL (ref 0.57–1)
DEPRECATED RDW RBC AUTO: 50.2 FL (ref 37–54)
EGFRCR SERPLBLD CKD-EPI 2021: 104 ML/MIN/1.73
EOSINOPHIL # BLD AUTO: 0.02 10*3/MM3 (ref 0–0.4)
EOSINOPHIL NFR BLD AUTO: 0.2 % (ref 0.3–6.2)
ERYTHROCYTE [DISTWIDTH] IN BLOOD BY AUTOMATED COUNT: 15.3 % (ref 12.3–15.4)
GLOBULIN UR ELPH-MCNC: 2.6 GM/DL
GLUCOSE SERPL-MCNC: 470 MG/DL (ref 65–99)
GLUCOSE UR STRIP-MCNC: ABNORMAL MG/DL
HCT VFR BLD AUTO: 44 % (ref 34–46.6)
HGB BLD-MCNC: 12.8 G/DL (ref 12–15.9)
HGB UR QL STRIP.AUTO: NEGATIVE
HOLD SPECIMEN: NORMAL
HOLD SPECIMEN: NORMAL
HYALINE CASTS UR QL AUTO: ABNORMAL /LPF
IMM GRANULOCYTES # BLD AUTO: 0.09 10*3/MM3 (ref 0–0.05)
IMM GRANULOCYTES NFR BLD AUTO: 0.8 % (ref 0–0.5)
KETONES UR QL STRIP: NEGATIVE
LEUKOCYTE ESTERASE UR QL STRIP.AUTO: ABNORMAL
LYMPHOCYTES # BLD AUTO: 0.51 10*3/MM3 (ref 0.7–3.1)
LYMPHOCYTES NFR BLD AUTO: 4.6 % (ref 19.6–45.3)
MCH RBC QN AUTO: 26.3 PG (ref 26.6–33)
MCHC RBC AUTO-ENTMCNC: 29.1 G/DL (ref 31.5–35.7)
MCV RBC AUTO: 90.3 FL (ref 79–97)
MONOCYTES # BLD AUTO: 0.35 10*3/MM3 (ref 0.1–0.9)
MONOCYTES NFR BLD AUTO: 3.2 % (ref 5–12)
NEUTROPHILS NFR BLD AUTO: 10.07 10*3/MM3 (ref 1.7–7)
NEUTROPHILS NFR BLD AUTO: 90.9 % (ref 42.7–76)
NITRITE UR QL STRIP: NEGATIVE
NRBC BLD AUTO-RTO: 0 /100 WBC (ref 0–0.2)
PH UR STRIP.AUTO: 6.5 [PH] (ref 5–8)
PLATELET # BLD AUTO: 190 10*3/MM3 (ref 140–450)
PMV BLD AUTO: 10.3 FL (ref 6–12)
POTASSIUM SERPL-SCNC: 4.9 MMOL/L (ref 3.5–5.2)
PROT SERPL-MCNC: 6.4 G/DL (ref 6–8.5)
PROT UR QL STRIP: NEGATIVE
RBC # BLD AUTO: 4.87 10*6/MM3 (ref 3.77–5.28)
RBC # UR STRIP: ABNORMAL /HPF
REF LAB TEST METHOD: ABNORMAL
SODIUM SERPL-SCNC: 133 MMOL/L (ref 136–145)
SP GR UR STRIP: 1.03 (ref 1–1.03)
SQUAMOUS #/AREA URNS HPF: ABNORMAL /HPF
UROBILINOGEN UR QL STRIP: ABNORMAL
WBC # UR STRIP: ABNORMAL /HPF
WBC NRBC COR # BLD AUTO: 11.07 10*3/MM3 (ref 3.4–10.8)
WHOLE BLOOD HOLD SPECIMEN: NORMAL

## 2025-03-17 PROCEDURE — 81001 URINALYSIS AUTO W/SCOPE: CPT

## 2025-03-17 PROCEDURE — 83690 ASSAY OF LIPASE: CPT | Performed by: EMERGENCY MEDICINE

## 2025-03-17 PROCEDURE — 99285 EMERGENCY DEPT VISIT HI MDM: CPT

## 2025-03-17 PROCEDURE — 36415 COLL VENOUS BLD VENIPUNCTURE: CPT

## 2025-03-17 PROCEDURE — 83880 ASSAY OF NATRIURETIC PEPTIDE: CPT | Performed by: EMERGENCY MEDICINE

## 2025-03-17 PROCEDURE — 80053 COMPREHEN METABOLIC PANEL: CPT | Performed by: REGISTERED NURSE

## 2025-03-17 PROCEDURE — 85025 COMPLETE CBC W/AUTO DIFF WBC: CPT | Performed by: REGISTERED NURSE

## 2025-03-17 NOTE — LETTER
March 18, 2025             To Whom it May Concern:    Cindy Aguilar was in the ED with family member. Please excuse from school March 17, 2025.            Sincerely,          TYLER Varghese RN

## 2025-03-17 NOTE — Clinical Note
Level of Care: Remote Telemetry [26]   Diagnosis: Generalized weakness [976197]   Admitting Physician: DAYRON BRIDGES [[id number]]   Certification: I Certify That Inpatient Hospital Services Are Medically Necessary For Greater Than 2 Midnights

## 2025-03-18 ENCOUNTER — APPOINTMENT (OUTPATIENT)
Dept: GENERAL RADIOLOGY | Facility: HOSPITAL | Age: 60
End: 2025-03-18
Payer: COMMERCIAL

## 2025-03-18 VITALS
BODY MASS INDEX: 22.22 KG/M2 | WEIGHT: 138.23 LBS | TEMPERATURE: 98.1 F | DIASTOLIC BLOOD PRESSURE: 61 MMHG | SYSTOLIC BLOOD PRESSURE: 91 MMHG | OXYGEN SATURATION: 90 % | HEART RATE: 105 BPM | HEIGHT: 66 IN | RESPIRATION RATE: 18 BRPM

## 2025-03-18 PROBLEM — J96.22 ACUTE ON CHRONIC RESPIRATORY FAILURE WITH HYPOXIA AND HYPERCAPNIA: Status: ACTIVE | Noted: 2024-06-13

## 2025-03-18 PROBLEM — R53.1 GENERALIZED WEAKNESS: Status: ACTIVE | Noted: 2025-03-18

## 2025-03-18 PROBLEM — J96.21 ACUTE ON CHRONIC RESPIRATORY FAILURE WITH HYPOXIA AND HYPERCAPNIA: Status: ACTIVE | Noted: 2024-06-13

## 2025-03-18 PROBLEM — L03.119 CELLULITIS OF LOWER EXTREMITY: Status: ACTIVE | Noted: 2021-06-21

## 2025-03-18 LAB
ANION GAP SERPL CALCULATED.3IONS-SCNC: 7.5 MMOL/L (ref 5–15)
ARTERIAL PATENCY WRIST A: ABNORMAL
ATMOSPHERIC PRESS: 748 MMHG
BASE EXCESS BLDA CALC-SCNC: 8.3 MMOL/L (ref -2–2)
BDY SITE: ABNORMAL
BUN BLDA-MCNC: 18 MG/DL (ref 8–23)
BUN SERPL-MCNC: 14 MG/DL (ref 6–20)
BUN/CREAT SERPL: 36.8 (ref 7–25)
CA-I BLDA-SCNC: 1.2 MMOL/L (ref 1.13–1.32)
CALCIUM SPEC-SCNC: 8.4 MG/DL (ref 8.6–10.5)
CHLORIDE BLDA-SCNC: 98 MMOL/L (ref 98–107)
CHLORIDE SERPL-SCNC: 101 MMOL/L (ref 98–107)
CO2 BLDA-SCNC: >34.54 MMOL/L (ref 23–27)
CO2 SERPL-SCNC: 28.5 MMOL/L (ref 22–29)
CREAT BLDA-MCNC: <0.3 MG/DL (ref 0.6–1.3)
CREAT SERPL-MCNC: 0.38 MG/DL (ref 0.57–1)
D-LACTATE SERPL-SCNC: 1.4 MMOL/L
D-LACTATE SERPL-SCNC: 1.9 MMOL/L (ref 0.5–2)
DEPRECATED RDW RBC AUTO: 50.6 FL (ref 37–54)
EGFRCR SERPLBLD CKD-EPI 2021: 115.6 ML/MIN/1.73
ERYTHROCYTE [DISTWIDTH] IN BLOOD BY AUTOMATED COUNT: 15 % (ref 12.3–15.4)
GEN 5 1HR TROPONIN T REFLEX: 18 NG/L
GLUCOSE BLDC GLUCOMTR-MCNC: 217 MG/DL (ref 70–99)
GLUCOSE BLDC GLUCOMTR-MCNC: 417 MG/DL (ref 65–99)
GLUCOSE SERPL-MCNC: 259 MG/DL (ref 65–99)
HCO3 BLDA-SCNC: 35.4 MMOL/L (ref 22–26)
HCT VFR BLD AUTO: 41.1 % (ref 34–46.6)
HCT VFR BLD CALC: 38 % (ref 38–51)
HEMODILUTION: NO
HGB BLD-MCNC: 12.2 G/DL (ref 12–15.9)
HGB BLDA-MCNC: 12.9 G/DL (ref 12–18)
LIPASE SERPL-CCNC: 41 U/L (ref 13–60)
MAGNESIUM SERPL-MCNC: 1.8 MG/DL (ref 1.6–2.6)
MCH RBC QN AUTO: 27.1 PG (ref 26.6–33)
MCHC RBC AUTO-ENTMCNC: 29.7 G/DL (ref 31.5–35.7)
MCV RBC AUTO: 91.3 FL (ref 79–97)
MODALITY: ABNORMAL
NT-PROBNP SERPL-MCNC: 405.6 PG/ML (ref 0–900)
PCO2 BLDA: 59.3 MM HG (ref 35–45)
PH BLDA: 7.38 PH UNITS (ref 7.35–7.45)
PLATELET # BLD AUTO: 179 10*3/MM3 (ref 140–450)
PMV BLD AUTO: 9.8 FL (ref 6–12)
PO2 BLDA: 66.9 MM HG (ref 80–100)
POTASSIUM BLDA-SCNC: 4.1 MMOL/L (ref 3.5–5)
POTASSIUM SERPL-SCNC: 4.4 MMOL/L (ref 3.5–5.2)
PROCALCITONIN SERPL-MCNC: 0.09 NG/ML (ref 0–0.25)
RBC # BLD AUTO: 4.5 10*6/MM3 (ref 3.77–5.28)
SAO2 % BLDCOA: 92 % (ref 95–99)
SODIUM BLD-SCNC: 140 MMOL/L (ref 131–143)
SODIUM SERPL-SCNC: 137 MMOL/L (ref 136–145)
TROPONIN T % DELTA: 0
TROPONIN T NUMERIC DELTA: 0 NG/L
TROPONIN T SERPL HS-MCNC: 18 NG/L
WBC NRBC COR # BLD AUTO: 9.02 10*3/MM3 (ref 3.4–10.8)
WHOLE BLOOD HOLD COAG: NORMAL

## 2025-03-18 PROCEDURE — 25810000003 SODIUM CHLORIDE 0.9 % SOLUTION: Performed by: INTERNAL MEDICINE

## 2025-03-18 PROCEDURE — 94799 UNLISTED PULMONARY SVC/PX: CPT

## 2025-03-18 PROCEDURE — 25810000003 SODIUM CHLORIDE 0.9 % SOLUTION: Performed by: EMERGENCY MEDICINE

## 2025-03-18 PROCEDURE — 25810000003 SODIUM CHLORIDE 0.9 % SOLUTION: Performed by: FAMILY MEDICINE

## 2025-03-18 PROCEDURE — 71045 X-RAY EXAM CHEST 1 VIEW: CPT

## 2025-03-18 PROCEDURE — 99222 1ST HOSP IP/OBS MODERATE 55: CPT | Performed by: FAMILY MEDICINE

## 2025-03-18 PROCEDURE — 36600 WITHDRAWAL OF ARTERIAL BLOOD: CPT

## 2025-03-18 PROCEDURE — 63710000001 INSULIN LISPRO (HUMAN) PER 5 UNITS: Performed by: FAMILY MEDICINE

## 2025-03-18 PROCEDURE — 94640 AIRWAY INHALATION TREATMENT: CPT

## 2025-03-18 PROCEDURE — 63710000001 INSULIN GLARGINE PER 5 UNITS: Performed by: INTERNAL MEDICINE

## 2025-03-18 PROCEDURE — 80047 BASIC METABLC PNL IONIZED CA: CPT

## 2025-03-18 PROCEDURE — 83605 ASSAY OF LACTIC ACID: CPT | Performed by: EMERGENCY MEDICINE

## 2025-03-18 PROCEDURE — 83735 ASSAY OF MAGNESIUM: CPT | Performed by: INTERNAL MEDICINE

## 2025-03-18 PROCEDURE — 87040 BLOOD CULTURE FOR BACTERIA: CPT | Performed by: EMERGENCY MEDICINE

## 2025-03-18 PROCEDURE — 85027 COMPLETE CBC AUTOMATED: CPT | Performed by: FAMILY MEDICINE

## 2025-03-18 PROCEDURE — 80048 BASIC METABOLIC PNL TOTAL CA: CPT | Performed by: FAMILY MEDICINE

## 2025-03-18 PROCEDURE — 96372 THER/PROPH/DIAG INJ SC/IM: CPT

## 2025-03-18 PROCEDURE — 25010000002 CEFEPIME PER 500 MG: Performed by: EMERGENCY MEDICINE

## 2025-03-18 PROCEDURE — 82803 BLOOD GASES ANY COMBINATION: CPT

## 2025-03-18 PROCEDURE — 36415 COLL VENOUS BLD VENIPUNCTURE: CPT | Performed by: FAMILY MEDICINE

## 2025-03-18 PROCEDURE — 84145 PROCALCITONIN (PCT): CPT | Performed by: INTERNAL MEDICINE

## 2025-03-18 PROCEDURE — 84484 ASSAY OF TROPONIN QUANT: CPT | Performed by: EMERGENCY MEDICINE

## 2025-03-18 PROCEDURE — 82948 REAGENT STRIP/BLOOD GLUCOSE: CPT | Performed by: FAMILY MEDICINE

## 2025-03-18 PROCEDURE — 96361 HYDRATE IV INFUSION ADD-ON: CPT

## 2025-03-18 PROCEDURE — 83605 ASSAY OF LACTIC ACID: CPT

## 2025-03-18 PROCEDURE — 96360 HYDRATION IV INFUSION INIT: CPT

## 2025-03-18 PROCEDURE — 25010000002 ENOXAPARIN PER 10 MG: Performed by: FAMILY MEDICINE

## 2025-03-18 RX ORDER — BISACODYL 5 MG/1
5 TABLET, DELAYED RELEASE ORAL DAILY PRN
Status: DISCONTINUED | OUTPATIENT
Start: 2025-03-18 | End: 2025-03-18

## 2025-03-18 RX ORDER — NALOXONE HCL 0.4 MG/ML
0.4 VIAL (ML) INJECTION AS NEEDED
Status: DISCONTINUED | OUTPATIENT
Start: 2025-03-18 | End: 2025-03-18 | Stop reason: HOSPADM

## 2025-03-18 RX ORDER — BISACODYL 10 MG
10 SUPPOSITORY, RECTAL RECTAL DAILY PRN
Status: DISCONTINUED | OUTPATIENT
Start: 2025-03-18 | End: 2025-03-18 | Stop reason: HOSPADM

## 2025-03-18 RX ORDER — NICOTINE POLACRILEX 4 MG
15 LOZENGE BUCCAL
Status: DISCONTINUED | OUTPATIENT
Start: 2025-03-18 | End: 2025-03-18 | Stop reason: HOSPADM

## 2025-03-18 RX ORDER — MORPHINE SULFATE 2 MG/ML
1 INJECTION, SOLUTION INTRAMUSCULAR; INTRAVENOUS
Status: DISCONTINUED | OUTPATIENT
Start: 2025-03-18 | End: 2025-03-18

## 2025-03-18 RX ORDER — IPRATROPIUM BROMIDE AND ALBUTEROL SULFATE 2.5; .5 MG/3ML; MG/3ML
3 SOLUTION RESPIRATORY (INHALATION) EVERY 4 HOURS PRN
Status: DISCONTINUED | OUTPATIENT
Start: 2025-03-18 | End: 2025-03-18

## 2025-03-18 RX ORDER — SODIUM CHLORIDE 0.9 % (FLUSH) 0.9 %
10 SYRINGE (ML) INJECTION AS NEEDED
Status: DISCONTINUED | OUTPATIENT
Start: 2025-03-18 | End: 2025-03-18 | Stop reason: HOSPADM

## 2025-03-18 RX ORDER — CLOPIDOGREL BISULFATE 75 MG/1
75 TABLET ORAL DAILY
Status: DISCONTINUED | OUTPATIENT
Start: 2025-03-19 | End: 2025-03-18

## 2025-03-18 RX ORDER — ALPRAZOLAM 0.25 MG
0.5 TABLET ORAL 2 TIMES DAILY PRN
Status: DISCONTINUED | OUTPATIENT
Start: 2025-03-18 | End: 2025-03-18 | Stop reason: HOSPADM

## 2025-03-18 RX ORDER — INSULIN LISPRO 100 [IU]/ML
14 INJECTION, SOLUTION INTRAVENOUS; SUBCUTANEOUS DAILY
COMMUNITY
Start: 2025-02-14

## 2025-03-18 RX ORDER — CLOPIDOGREL BISULFATE 75 MG/1
75 TABLET ORAL DAILY
Status: DISCONTINUED | OUTPATIENT
Start: 2025-03-18 | End: 2025-03-18

## 2025-03-18 RX ORDER — FUROSEMIDE 20 MG/1
20 TABLET ORAL DAILY
Status: DISCONTINUED | OUTPATIENT
Start: 2025-03-18 | End: 2025-03-18 | Stop reason: HOSPADM

## 2025-03-18 RX ORDER — FUROSEMIDE 20 MG/1
20 TABLET ORAL DAILY
COMMUNITY
End: 2025-03-26

## 2025-03-18 RX ORDER — ENOXAPARIN SODIUM 100 MG/ML
30 INJECTION SUBCUTANEOUS DAILY
Status: DISCONTINUED | OUTPATIENT
Start: 2025-03-18 | End: 2025-03-18 | Stop reason: DRUGHIGH

## 2025-03-18 RX ORDER — POLYETHYLENE GLYCOL 3350 17 G/17G
17 POWDER, FOR SOLUTION ORAL DAILY PRN
Status: DISCONTINUED | OUTPATIENT
Start: 2025-03-18 | End: 2025-03-18 | Stop reason: HOSPADM

## 2025-03-18 RX ORDER — ALPRAZOLAM 0.25 MG
0.5 TABLET ORAL 2 TIMES DAILY PRN
Status: DISCONTINUED | OUTPATIENT
Start: 2025-03-18 | End: 2025-03-18

## 2025-03-18 RX ORDER — ENOXAPARIN SODIUM 100 MG/ML
40 INJECTION SUBCUTANEOUS DAILY
Status: DISCONTINUED | OUTPATIENT
Start: 2025-03-18 | End: 2025-03-18 | Stop reason: HOSPADM

## 2025-03-18 RX ORDER — AMOXICILLIN 250 MG
2 CAPSULE ORAL 2 TIMES DAILY PRN
Status: DISCONTINUED | OUTPATIENT
Start: 2025-03-18 | End: 2025-03-18 | Stop reason: HOSPADM

## 2025-03-18 RX ORDER — FUROSEMIDE 20 MG/1
20 TABLET ORAL DAILY
Status: DISCONTINUED | OUTPATIENT
Start: 2025-03-18 | End: 2025-03-18

## 2025-03-18 RX ORDER — INSULIN LISPRO 100 [IU]/ML
2-9 INJECTION, SOLUTION INTRAVENOUS; SUBCUTANEOUS
Status: DISCONTINUED | OUTPATIENT
Start: 2025-03-18 | End: 2025-03-18 | Stop reason: HOSPADM

## 2025-03-18 RX ORDER — CLOPIDOGREL BISULFATE 75 MG/1
75 TABLET ORAL DAILY
Status: DISCONTINUED | OUTPATIENT
Start: 2025-03-19 | End: 2025-03-18 | Stop reason: HOSPADM

## 2025-03-18 RX ORDER — POLYETHYLENE GLYCOL 400 AND PROPYLENE GLYCOL 4; 3 MG/ML; MG/ML
1-2 SOLUTION/ DROPS OPHTHALMIC 4 TIMES DAILY PRN
COMMUNITY
Start: 2025-01-31

## 2025-03-18 RX ORDER — BISACODYL 10 MG
10 SUPPOSITORY, RECTAL RECTAL DAILY PRN
Status: DISCONTINUED | OUTPATIENT
Start: 2025-03-18 | End: 2025-03-18

## 2025-03-18 RX ORDER — DEXTROSE MONOHYDRATE 25 G/50ML
25 INJECTION, SOLUTION INTRAVENOUS
Status: DISCONTINUED | OUTPATIENT
Start: 2025-03-18 | End: 2025-03-18 | Stop reason: HOSPADM

## 2025-03-18 RX ORDER — AMOXICILLIN 250 MG
2 CAPSULE ORAL 2 TIMES DAILY PRN
Status: DISCONTINUED | OUTPATIENT
Start: 2025-03-18 | End: 2025-03-18

## 2025-03-18 RX ORDER — IPRATROPIUM BROMIDE AND ALBUTEROL SULFATE 2.5; .5 MG/3ML; MG/3ML
3 SOLUTION RESPIRATORY (INHALATION) ONCE
Status: COMPLETED | OUTPATIENT
Start: 2025-03-18 | End: 2025-03-18

## 2025-03-18 RX ORDER — ONDANSETRON 2 MG/ML
4 INJECTION INTRAMUSCULAR; INTRAVENOUS EVERY 6 HOURS PRN
Status: DISCONTINUED | OUTPATIENT
Start: 2025-03-18 | End: 2025-03-18 | Stop reason: HOSPADM

## 2025-03-18 RX ORDER — IPRATROPIUM BROMIDE AND ALBUTEROL SULFATE 2.5; .5 MG/3ML; MG/3ML
3 SOLUTION RESPIRATORY (INHALATION) EVERY 4 HOURS PRN
Status: DISCONTINUED | OUTPATIENT
Start: 2025-03-18 | End: 2025-03-18 | Stop reason: HOSPADM

## 2025-03-18 RX ORDER — SODIUM CHLORIDE 9 MG/ML
100 INJECTION, SOLUTION INTRAVENOUS CONTINUOUS
Status: DISCONTINUED | OUTPATIENT
Start: 2025-03-18 | End: 2025-03-18 | Stop reason: HOSPADM

## 2025-03-18 RX ORDER — CETIRIZINE HYDROCHLORIDE 10 MG/1
10 TABLET ORAL DAILY
Status: DISCONTINUED | OUTPATIENT
Start: 2025-03-19 | End: 2025-03-18 | Stop reason: HOSPADM

## 2025-03-18 RX ORDER — POLYETHYLENE GLYCOL 3350 17 G/17G
17 POWDER, FOR SOLUTION ORAL DAILY PRN
Status: DISCONTINUED | OUTPATIENT
Start: 2025-03-18 | End: 2025-03-18

## 2025-03-18 RX ORDER — CETIRIZINE HYDROCHLORIDE 10 MG/1
10 TABLET ORAL DAILY
Status: DISCONTINUED | OUTPATIENT
Start: 2025-03-18 | End: 2025-03-18

## 2025-03-18 RX ORDER — IBUPROFEN 600 MG/1
1 TABLET ORAL
Status: DISCONTINUED | OUTPATIENT
Start: 2025-03-18 | End: 2025-03-18 | Stop reason: HOSPADM

## 2025-03-18 RX ORDER — SODIUM CHLORIDE 0.9 % (FLUSH) 0.9 %
10 SYRINGE (ML) INJECTION EVERY 12 HOURS SCHEDULED
Status: DISCONTINUED | OUTPATIENT
Start: 2025-03-18 | End: 2025-03-18 | Stop reason: HOSPADM

## 2025-03-18 RX ORDER — SODIUM CHLORIDE 9 MG/ML
40 INJECTION, SOLUTION INTRAVENOUS AS NEEDED
Status: DISCONTINUED | OUTPATIENT
Start: 2025-03-18 | End: 2025-03-18 | Stop reason: HOSPADM

## 2025-03-18 RX ADMIN — SODIUM CHLORIDE 1000 ML: 9 INJECTION, SOLUTION INTRAVENOUS at 01:18

## 2025-03-18 RX ADMIN — SODIUM CHLORIDE 100 ML/HR: 9 INJECTION, SOLUTION INTRAVENOUS at 08:16

## 2025-03-18 RX ADMIN — SODIUM CHLORIDE 500 ML: 9 INJECTION, SOLUTION INTRAVENOUS at 08:15

## 2025-03-18 RX ADMIN — SODIUM CHLORIDE 100 ML/HR: 9 INJECTION, SOLUTION INTRAVENOUS at 05:01

## 2025-03-18 RX ADMIN — ENOXAPARIN SODIUM 40 MG: 100 INJECTION SUBCUTANEOUS at 08:15

## 2025-03-18 RX ADMIN — IPRATROPIUM BROMIDE AND ALBUTEROL SULFATE 3 ML: .5; 3 SOLUTION RESPIRATORY (INHALATION) at 01:35

## 2025-03-18 RX ADMIN — SODIUM CHLORIDE 2000 MG: 9 INJECTION, SOLUTION INTRAVENOUS at 02:56

## 2025-03-18 RX ADMIN — INSULIN GLARGINE 15 UNITS: 100 INJECTION, SOLUTION SUBCUTANEOUS at 08:15

## 2025-03-18 RX ADMIN — INSULIN LISPRO 4 UNITS: 100 INJECTION, SOLUTION INTRAVENOUS; SUBCUTANEOUS at 08:15

## 2025-03-18 RX ADMIN — IPRATROPIUM BROMIDE AND ALBUTEROL SULFATE 3 ML: .5; 3 SOLUTION RESPIRATORY (INHALATION) at 01:49

## 2025-03-18 NOTE — H&P
[facility]   HISTORY AND PHYSICAL    Patient Name: Isha Llanes  : 1965  MRN: [id number]  Primary Care Physician:  Virgil Salas MD  Date of admission: 3/17/2025    Subjective   Subjective     Chief Complaint: Lethargy, generalized weakness    HPI:    Isha Llanes is a 59 y.o. female with past medical history of the heart currently, laryngeal cancer status post feeding tube, Hodgkin's lymphoma, squamous cell carcinoma, dysphagia, CHF, COPD, diabetes, hyperlipidemia, lupus, ABY, and GERD presented to the ED for evaluation of lethargy, worsening pain, and generalized weakness.  Patient states that for the last week or so she has not been feeling well with increased weakness creatinine regularly, he distractedly criticality, occasional chills, and severe family from the left side of the neck.  Patient had been treated for non-Hodgkin lymphoma and laryngeal carcinoma but she recently found that the cancer had spread.  Patient follows up with oncology in [place] and already had PET scans and biopsies done.  She has a follow-up with them again next week.  Due to her worsening conditions intermittent confusion, her daughters became concerned and brought her to the ED for further evaluation.  In the ED patient was tachycardic on arrival with remaining vitals being within normal limits.  Labs showed significant hyperglycemia, and mild leukocytosis.  ABG showed compensated hypercapnia.  Chest x-ray showed mild infiltrates on the left versus the right lung base.  When seen patient was still in significant pain but was fully oriented.  When asked she denied any recent fevers, focal weakness, chest pain, palpitation, abdominal pain, nausea, vomiting, diarrhea, constipation, dysuria, hematuria, hematochezia, melena, or anxiety.  Patient admitted for further evaluation and treatment.    Review of Systems   All systems were reviewed and negative except for: As per HPI    Personal History     Past  Medical History:   Diagnosis Date    Anesthesia     REPORTS HAS GOT REAL EMOTIONAL AND HAS BECOME ANGRY BEFORE    Anxiety     Aortic stenosis, severe     HAS BIO VALVE REPLACED    Arthritis     Asthma     Back pain     Blood clotting disorder     Cancer     Cancer associated pain     HODGKINS LYMPHOMA    CHF (congestive heart failure)     Chronic low back pain with sciatica     Chronic pain disorder     COPD (chronic obstructive pulmonary disease)     Coronary artery disease     Diabetes mellitus     TYPE 2    Diabetes mellitus     Dyspnea on effort     GERD (gastroesophageal reflux disease)     H/O lumpectomy     H/O: hysterectomy     Hiatal hernia     History of degenerative disc disease     History of kidney stones     History of pulmonary embolus (PE)     History of transfusion     AS A CHILD NO TRANSFUSION REACTION    History of transfusion     Hodgkin's lymphoma     5/19/23  5 YEARS SINCE LAST CHEMO TX    Hypertension     Immobility     Liver disease     DENIES ANY CURRENT ISSUES    Lupus     Mitral valve prolapse     Nausea vomiting and diarrhea 3/7/2024    Panic disorder     Pelvic pain     Peripheral neuropathy     Personal history of DVT (deep vein thrombosis)     Presence of intrathecal pump     PTSD (post-traumatic stress disorder)     Sacroiliac joint disease     SI (sacroiliac) joint inflammation     Sleep apnea     Venous insufficiency        Past Surgical History:   Procedure Laterality Date    ABDOMINAL SURGERY      BACK SURGERY      SPINAL FUSION     BACK SURGERY      BLADDER REPAIR      CARDIAC CATHETERIZATION N/A 03/06/2019    Procedure: Valvuloplasty;  Surgeon: Wayne Johnson MD;  Location: [facility] CATH INVASIVE LOCATION;  Service: Cardiology    CARDIAC CATHETERIZATION      CARDIAC CATHETERIZATION Left 5/31/2023    Procedure: Cardiac Catheterization/Vascular Study;  Surgeon: Poncho Reid MD;  Location: [facility] CATH INVASIVE LOCATION;  Service: Cardiovascular;  Laterality: Left;     CARDIAC SURGERY      CARDIAC VALVE REPLACEMENT  2021    CHOLECYSTECTOMY      COLONOSCOPY N/A 12/29/2021    Procedure: COLONOSCOPY;  Surgeon: Karine Orlando MD;  Location: [facility] ENDOSCOPY;  Service: Gastroenterology;  Laterality: N/A;  POOR PREP    COLONOSCOPY N/A 04/13/2022    Procedure: COLONOSCOPY WITH POLYPECTOMY;  Surgeon: Karine Orlando MD;  Location: [facility] ENDOSCOPY;  Service: Gastroenterology;  Laterality: N/A;  COLON POLYP, HEMORRHOIDS, POOR PREP    COLONOSCOPY      DIRECT LARYNGOSCOPY, ESOPHAGOSCOPY, BRONCHOSCOPY N/A 5/22/2023    Procedure: DIRECT LARYNGOSCOPY WITH BIOPSIES , ESOPHAGOSCOPY, BRONCHOSCOPY;  Surgeon: Ronnie Mcgarry MD;  Location: [facility] OR [facility];  Service: ENT;  Laterality: N/A;    ENDOSCOPY N/A 12/29/2021    Procedure: ESOPHAGOGASTRODUODENOSCOPY;  Surgeon: Karine Orlando MD;  Location: [facility] ENDOSCOPY;  Service: Gastroenterology;  Laterality: N/A;  ESOPHAGITIS, GASTRITIS, HIATAL HERNIA    ENDOSCOPY      ENDOSCOPY N/A 4/16/2024    Procedure: ESOPHAGOGASTRODUODENOSCOPY WITH BIOPSIES;  Surgeon: Karine Orlando MD;  Location: [facility] ENDOSCOPY;  Service: Gastroenterology;  Laterality: N/A;  ESOPHAGITIS, HIATAL HERNIA    ENDOSCOPY W/ PEG TUBE PLACEMENT N/A 12/10/2024    Procedure: ESOPHAGOGASTRODUODENOSCOPY WITH PERCUTANEOUS ENDOSCOPIC GASTROSTOMY TUBE INSERTION WITH ANESTHESIA;  Surgeon: Rodger Ortega MD;  Location: [facility] ENDOSCOPY;  Service: Gastroenterology;  Laterality: N/A;  PEG TUBE INSERTION    HYSTERECTOMY      LYMPHADENECTOMY      PAIN PUMP INSERTION/REVISION N/A 10/18/2019    Procedure: PAIN PUMP INSERTION [facility] 10-18-19 RIOS;  Surgeon: Horace Rios MD;  Location: [facility] OR;  Service: Pain Management    PAIN PUMP INSERTION/REVISION N/A 11/23/2020    Procedure: pain pump removal;  Surgeon: Horace Rios MD;  Location: [facility] OR;  Service: Pain Management;  Laterality: N/A;    PEG TUBE INSERTION N/A 7/26/2023     Procedure: PERCUTANEOUS ENDOSCOPIC GASTROSTOMY TUBE INSERTION;  Surgeon: Kody Mercer MD;  Location: [facility] ENDOSCOPY;  Service: General;  Laterality: N/A;  SUCCESSFUL PEG TUBE PLACE PLACEMENT    PORTACATH PLACEMENT      IN LEFT CHEST REPORTS THAT IT IS NOT FUNCTIONING    SKIN BIOPSY      TONSILLECTOMY      TUBAL ABDOMINAL LIGATION      TUMOR REMOVAL         Family History: family history includes ADD / ADHD in her brother, granddaughter, grandson, and nephew; Anxiety disorder in her mother; Bipolar disorder in her sister; Depression in her sister; Heart failure in her mother; Pancreatic cancer in her paternal grandmother and sister; Prostate cancer in her father; Thyroid cancer in her maternal grandmother. Otherwise pertinent FHx was reviewed and not pertinent to current issue.    Social History:  reports that she has been smoking cigarettes. She started smoking about 46 years ago. She has a 69.3 pack-year smoking history. She has been exposed to tobacco smoke. She has never used smokeless tobacco. She reports that she does not drink alcohol and does not use drugs.    Home Medications:  ALPRAZolam, Insulin Lispro (1 Unit Dial), acetaminophen, albuterol sulfate HFA, cetirizine, clopidogrel, furosemide, mupirocin, naloxone, pain, polyethyl glycol-propyl glycol, and predniSONE      Allergies:  Allergies   Allergen Reactions    Amoxicillin Shortness Of Breath     Has tolerated Cefazolin, Ceftriaxone, and Cefepime -Jermaine Melida, [facility]    Ceclor [Cefaclor] Shortness Of Breath     Has tolerated Cefazolin, Ceftriaxone, and Cefepime -Jermaine Montez, [facility]      Penicillins Shortness Of Breath     Has tolerated Cefazolin, Ceftriaxone, and Cefepime -Jermaine Montez, [facility]    Sulfa Antibiotics Rash    Valium [Diazepam] Mental Status Change     DEPRESSED    Ambien [Zolpidem] Mental Status Change    Aspirin GI Intolerance    Ativan [Lorazepam] Mental Status Change    Benadryl [Diphenhydramine] Anxiety     AND MAKES HER  HYPER    Biaxin [Clarithromycin] Unknown - Low Severity    Cephalexin Unknown - Low Severity    Clindamycin Unknown - Low Severity    Compazine [Prochlorperazine Edisylate] Rash    Contrast Dye (Echo Or Unknown Ct/Mr) Other (See Comments)     Caused pain in her arm    Doxycycline Rash    Nsaids GI Intolerance    Phenergan [Promethazine Hcl] GI Intolerance    Promethazine GI Intolerance    Vancomycin Itching       Objective   Objective     Vitals:   Temp:  [98.1 °F (36.7 °C)-98.2 °F (36.8 °C)] 98.1 °F (36.7 °C)  Heart Rate:  [] 91  Resp:  [14-20] 18  BP: ()/(66-75) 111/66  Physical Exam    Constitutional: Awake, alert, ill-appearing   Eyes: PERRLA, sclerae anicteric, no conjunctival injection   HENT: NCAT,    Neck:  cervical lymphadenopathy, neck tenderness, mandibular tenderness trachea midline   Respiratory: Decreased breath sounds bilaterally, wheezing, nonlabored respirations    Cardiovascular: Tachycardia, no murmurs, rubs, or gallops, palpable pedal pulses bilaterally   Gastrointestinal: Positive bowel sounds, soft, nontender, nondistended   Musculoskeletal: Bilateral lower extremity edema, no clubbing or cyanosis to extremities   Psychiatric: Appropriate affect, cooperative   Neurologic: Oriented x 3, strength symmetric in all extremities, Cranial Nerves grossly intact to confrontation, speech clear   Skin: Venous stasis dermatitis, open wounds, blistering                              Result Review    Result Review:  I have personally reviewed the results from the time of this admission to 3/18/2025 06:27 EDT and agree with these findings:  [x]  Laboratory list / accordion  []  Microbiology  [x]  Radiology  [x]  EKG/Telemetry   []  Cardiology/Vascular   []  Pathology  []  Old records  []  Other:  Most notable findings include: Labs showed significant hyperglycemia, and mild leukocytosis.  ABG showed compensated hypercapnia.  Chest x-ray showed mild infiltrates on the left versus the right lung  base.       Assessment & Plan   Assessment / Plan     Brief Patient Summary:  Isha Llanes is a 59 y.o. female with past medical history of the heart currently, laryngeal cancer status post feeding tube, Hodgkin's lymphoma, squamous cell carcinoma, dysphagia, CHF, COPD, diabetes, hyperlipidemia, lupus, ABY, and GERD presented to the ED for evaluation of lethargy, worsening pain, and generalized weakness.    Active Hospital Problems:  Active Hospital Problems    Diagnosis     **Generalized weakness     Acute on chronic respiratory failure with hypoxia and hypercapnia     History of laryngeal cancer     Dysphagia     Malignant neoplasm of supraglottis     Hodgkin lymphoma     Tobacco abuse     Venous stasis ulcer of right calf     Cellulitis of lower extremity     Lupus (systemic lupus erythematosus)     Depression     GERD (gastroesophageal reflux disease)     CAD (coronary artery disease)     CHF (congestive heart failure)     Chronic obstructive pulmonary disease     Chronic pain syndrome     Cancer associated pain      Plan:     Generalized weakness  -Admitted telemetry  -Patient with lower extremity wounds, venous stasis, redness around feeding tube insertion  -Antibiotics given in ED, resume if warranted  -Patient with worsening metastatic disease  -Decrease fluid intake  -gentle hydration  -Fall precautions  -PT OT  -Placement if needed  -Supportive care    Hodgkin's lymphoma/laryngeal  -Patient follows up at [facility]  -Worsening metastatic disease noted  -PET scanning and biopsy recently done  -Patient with pain pump  -As needed pain meds  -Also oncology if warranted    Diabetes  -Insulin sliding scale  -Levemir at bedtime  -Titrate as needed    HTN  -Currently well controlled  -PRN BP meds  -Resume home meds when available  -Titrate if needed    Lower extremity cellulitis    COPD  ABY  Lupus  Aortic stenosis status post TAVR    GI ppx  DVT ppx      VTE Prophylaxis:  Pharmacologic VTE  prophylaxis orders are present.        CODE STATUS:    Code Status (Patient has no pulse and is not breathing): CPR (Attempt to Resuscitate)  Medical Interventions (Patient has pulse or is breathing): Full Support  Level Of Support Discussed With: Patient    Admission Status:  I believe this patient meets inpatient status.      Electronically signed by Luis Pal MD, 03/18/25, 6:27 AM EDT.

## 2025-03-18 NOTE — NURSING NOTE
Pt told tech she wants to leave AMA, because she is not ready to stay in the hospital. Pt alert and oriented x4. Myself and charge RN both educated and encouraged pt on benefits of staying and risks of leaving, pt verbalized understanding, still wishes to leave. AMA form complete per pt. MD notified. Transferring RN on 4mtu also notified of pt no longer coming to unit. Pt left floor via personal wheelchair. Meds and belongings from security and pharmacy given to pt.

## 2025-03-18 NOTE — PAYOR COMM NOTE
PATIENT INFORMATION  Name:  Isha Llanes  MRN#:     [id number]  :  1965         ADMISSION INFORMATION  CLASS: Inpatient   DOS:  25        CURRENT ATTENDING PROVIDER INFORMATION  Name/NPI: Luis Pal MD (NPI: [id number])   Phone:  [phone]  Fax:  [phone]        REQUESTING PROVIDER and RENDERING FACILITY  Name:  [facility]   NPI:  [id number]  TID:  [id number]  Address:      [facility] [address] [place] [address]  Phone:               [phone]  Fax:  [phone]        UTILIZATION REVIEW CONTACT INFORMATION  Phone:      [phone]  Fax:           [phone]        ADMISSION DIAGNOSIS  Weakness generalized [R53.1]  Generalized weakness [R53.1]  Acute on chronic respiratory failure with hypoxia and hypercapnia [J96.21, J96.22]  Cellulitis of lower extremity, unspecified laterality [L03.119]        ++++++++++++++++++++++++++++++++++++++++++++++++++++++++++++++++++++++++++++++++          Isha Llanes (59 y.o. Female)       Date of Birth   1965    Social Security Number       Address   [address] [place], [place] [address]    Home Phone   [phone]    MRN   [id number]       Anglican   None    Marital Status   Legally                             Admission Date   3/17/2025    Admission Type   Emergency    Admitting Provider   Luis Pal MD    Attending Provider       Department, Room/Bed   [address], [facility]/1       Discharge Date   3/18/2025    Discharge Disposition   Left Against Medical Advice    Discharge Destination                                 Attending Provider: (none)   Allergies: Amoxicillin, Ceclor [Cefaclor], Penicillins, Sulfa Antibiotics, Valium [Diazepam], Ambien [Zolpidem], Aspirin, Ativan [Lorazepam], Benadryl [Diphenhydramine], Biaxin [Clarithromycin], Cephalexin, Clindamycin, Compazine [Prochlorperazine Edisylate], Contrast Dye (Echo Or Unknown Ct/mr), Doxycycline,  "Nsaids, Phenergan [Promethazine Hcl], Promethazine, Vancomycin    Isolation: None   Infection: None   Code Status: CPR    Ht: 167.6 cm (65.98\")   Wt: 62.7 kg (138 lb 3.7 oz)    Admission Cmt: None   Principal Problem: Generalized weakness [R53.1]                   Active Insurance as of 3/17/2025       Primary Coverage       Payor Plan Insurance Group Employer/Plan Group    P[employer]ORT [employer] BY HEIDE PASSPORT BY HEIDE [id number]       Payor Plan Address Payor Plan Phone Number Payor Plan Fax Number Effective Dates    [address] [address]   2021 - None Entered    [facility] [id number]         Subscriber Name Subscriber Birth Date Member ID       RENÉE LLANES 1965 [id number]                     Emergency Contacts        (Rel.) Home Phone Work Phone Mobile Phone    Sheila Watson (Daughter) [phone] -- [phone]                 History & Physical        Luis Pal MD at [address] [place] [address]   HISTORY AND PHYSICAL    Patient Name: Renée Llanes  : 1965  MRN: [id number]  Primary Care Physician:  Virgil Salas MD  Date of admission: 3/17/2025    Subjective  Subjective     Chief Complaint: Lethargy, generalized weakness    HPI:    Renée Llanes is a 59 y.o. female with past medical history of the heart currently, laryngeal cancer status post feeding tube, Hodgkin's lymphoma, squamous cell carcinoma, dysphagia, CHF, COPD, diabetes, hyperlipidemia, lupus, ABY, and GERD presented to the ED for evaluation of lethargy, worsening pain, and generalized weakness.  Patient states that for the last week or so she has not been feeling well with increased weakness creatinine regularly, he distractedly criticality, occasional chills, and severe family from the left side of the neck.  Patient had been treated for non-Hodgkin lymphoma and laryngeal carcinoma but she recently found that the cancer had spread.  Patient follows up with oncology in [place] " and already had PET scans and biopsies done.  She has a follow-up with them again next week.  Due to her worsening conditions intermittent confusion, her daughters became concerned and brought her to the ED for further evaluation.  In the ED patient was tachycardic on arrival with remaining vitals being within normal limits.  Labs showed significant hyperglycemia, and mild leukocytosis.  ABG showed compensated hypercapnia.  Chest x-ray showed mild infiltrates on the left versus the right lung base.  When seen patient was still in significant pain but was fully oriented.  When asked she denied any recent fevers, focal weakness, chest pain, palpitation, abdominal pain, nausea, vomiting, diarrhea, constipation, dysuria, hematuria, hematochezia, melena, or anxiety.  Patient admitted for further evaluation and treatment.    Review of Systems   All systems were reviewed and negative except for: As per HPI    Personal History     Past Medical History:   Diagnosis Date    Anesthesia     REPORTS HAS GOT REAL EMOTIONAL AND HAS BECOME ANGRY BEFORE    Anxiety     Aortic stenosis, severe     HAS BIO VALVE REPLACED    Arthritis     Asthma     Back pain     Blood clotting disorder     Cancer     Cancer associated pain     HODGKINS LYMPHOMA    CHF (congestive heart failure)     Chronic low back pain with sciatica     Chronic pain disorder     COPD (chronic obstructive pulmonary disease)     Coronary artery disease     Diabetes mellitus     TYPE 2    Diabetes mellitus     Dyspnea on effort     GERD (gastroesophageal reflux disease)     H/O lumpectomy     H/O: hysterectomy     Hiatal hernia     History of degenerative disc disease     History of kidney stones     History of pulmonary embolus (PE)     History of transfusion     AS A CHILD NO TRANSFUSION REACTION    History of transfusion     Hodgkin's lymphoma     5/19/23  5 YEARS SINCE LAST CHEMO TX    Hypertension     Immobility     Liver disease     DENIES ANY CURRENT ISSUES     Lupus     Mitral valve prolapse     Nausea vomiting and diarrhea 3/7/2024    Panic disorder     Pelvic pain     Peripheral neuropathy     Personal history of DVT (deep vein thrombosis)     Presence of intrathecal pump     PTSD (post-traumatic stress disorder)     Sacroiliac joint disease     SI (sacroiliac) joint inflammation     Sleep apnea     Venous insufficiency        Past Surgical History:   Procedure Laterality Date    ABDOMINAL SURGERY      BACK SURGERY      SPINAL FUSION     BACK SURGERY      BLADDER REPAIR      CARDIAC CATHETERIZATION N/A 03/06/2019    Procedure: Valvuloplasty;  Surgeon: Wayne Johnson MD;  Location: [facility] INVASIVE LOCATION;  Service: Cardiology    CARDIAC CATHETERIZATION      CARDIAC CATHETERIZATION Left 5/31/2023    Procedure: Cardiac Catheterization/Vascular Study;  Surgeon: Poncho Reid MD;  Location: [facility] CATH INVASIVE LOCATION;  Service: Cardiovascular;  Laterality: Left;    CARDIAC SURGERY      CARDIAC VALVE REPLACEMENT  2021    CHOLECYSTECTOMY      COLONOSCOPY N/A 12/29/2021    Procedure: COLONOSCOPY;  Surgeon: Karine Orlando MD;  Location: [facility] ENDOSCOPY;  Service: Gastroenterology;  Laterality: N/A;  POOR PREP    COLONOSCOPY N/A 04/13/2022    Procedure: COLONOSCOPY WITH POLYPECTOMY;  Surgeon: Karine Orlando MD;  Location: [facility] ENDOSCOPY;  Service: Gastroenterology;  Laterality: N/A;  COLON POLYP, HEMORRHOIDS, POOR PREP    COLONOSCOPY      DIRECT LARYNGOSCOPY, ESOPHAGOSCOPY, BRONCHOSCOPY N/A 5/22/2023    Procedure: DIRECT LARYNGOSCOPY WITH BIOPSIES , ESOPHAGOSCOPY, BRONCHOSCOPY;  Surgeon: Ronnie Mcgarry MD;  [place] [facility] [place] [facility]  Service: ENT;  Laterality: N/A;    ENDOSCOPY N/A 12/29/2021    Procedure: ESOPHAGOGASTRODUODENOSCOPY;  Surgeon: Karine Orlando MD;  Location: [facility] ENDOSCOPY;  Service: Gastroenterology;  Laterality: N/A;  ESOPHAGITIS, GASTRITIS, HIATAL HERNIA    ENDOSCOPY      ENDOSCOPY N/A 4/16/2024     Procedure: ESOPHAGOGASTRODUODENOSCOPY WITH BIOPSIES;  Surgeon: Karine Orlando MD;  Location: [facility] ENDOSCOPY;  Service: Gastroenterology;  Laterality: N/A;  ESOPHAGITIS, HIATAL HERNIA    ENDOSCOPY W/ PEG TUBE PLACEMENT N/A 12/10/2024    Procedure: ESOPHAGOGASTRODUODENOSCOPY WITH PERCUTANEOUS ENDOSCOPIC GASTROSTOMY TUBE INSERTION WITH ANESTHESIA;  Surgeon: Rodger Ortega MD;  Location: [facility] ENDOSCOPY;  Service: Gastroenterology;  Laterality: N/A;  PEG TUBE INSERTION    HYSTERECTOMY      LYMPHADENECTOMY      PAIN PUMP INSERTION/REVISION N/A 10/18/2019    Procedure: PAIN PUMP INSERTION [facility] 10-18-19 [place];  Surgeon: Horace Rios MD;  Location: [facility] OR;  Service: Pain Management    PAIN PUMP INSERTION/REVISION N/A 11/23/2020    Procedure: pain pump removal;  Surgeon: Horace Rios MD;  Location: [facility] OR;  Service: Pain Management;  Laterality: N/A;    PEG TUBE INSERTION N/A 7/26/2023    Procedure: PERCUTANEOUS ENDOSCOPIC GASTROSTOMY TUBE INSERTION;  Surgeon: Kody Mercer MD;  Location: [facility] ENDOSCOPY;  Service: General;  Laterality: N/A;  SUCCESSFUL PEG TUBE PLACE PLACEMENT    PORTACATH PLACEMENT      IN LEFT CHEST REPORTS THAT IT IS NOT FUNCTIONING    SKIN BIOPSY      TONSILLECTOMY      TUBAL ABDOMINAL LIGATION      TUMOR REMOVAL         Family History: family history includes ADD / ADHD in her brother, granddaughter, grandson, and nephew; Anxiety disorder in her mother; Bipolar disorder in her sister; Depression in her sister; Heart failure in her mother; Pancreatic cancer in her paternal grandmother and sister; Prostate cancer in her father; Thyroid cancer in her maternal grandmother. Otherwise pertinent FHx was reviewed and not pertinent to current issue.    Social History:  reports that she has been smoking cigarettes. She started smoking about 46 years ago. She has a 69.3 pack-year smoking history. She has been exposed to tobacco smoke. She has never used  smokeless tobacco. She reports that she does not drink alcohol and does not use drugs.    Home Medications:  ALPRAZolam, Insulin Lispro (1 Unit Dial), acetaminophen, albuterol sulfate HFA, cetirizine, clopidogrel, furosemide, mupirocin, naloxone, pain, polyethyl glycol-propyl glycol, and predniSONE      Allergies:  Allergies   Allergen Reactions    Amoxicillin Shortness Of Breath     Has tolerated Cefazolin, Ceftriaxone, and Cefepime -[facility], [facility]    Ceclor [Cefaclor] Shortness Of Breath     Has tolerated Cefazolin, Ceftriaxone, and Cefepime -[facility]      Penicillins Shortness Of Breath     Has tolerated Cefazolin, Ceftriaxone, and Cefepime -[facility], [facility]    Sulfa Antibiotics Rash    Valium [Diazepam] Mental Status Change     DEPRESSED    Ambien [Zolpidem] Mental Status Change    Aspirin GI Intolerance    Ativan [Lorazepam] Mental Status Change    Benadryl [Diphenhydramine] Anxiety     AND MAKES HER HYPER    Biaxin [Clarithromycin] Unknown - Low Severity    Cephalexin Unknown - Low Severity    Clindamycin Unknown - Low Severity    Compazine [Prochlorperazine Edisylate] Rash    Contrast Dye (Echo Or Unknown Ct/Mr) Other (See Comments)     Caused pain in her arm    Doxycycline Rash    Nsaids GI Intolerance    Phenergan [Promethazine Hcl] GI Intolerance    Promethazine GI Intolerance    Vancomycin Itching       Objective  Objective     Vitals:   Temp:  [98.1 °F (36.7 °C)-98.2 °F (36.8 °C)] 98.1 °F (36.7 °C)  Heart Rate:  [] 91  Resp:  [14-20] 18  BP: ()/(66-75) 111/66  Physical Exam    Constitutional: Awake, alert, ill-appearing   Eyes: PERRLA, sclerae anicteric, no conjunctival injection   HENT: NCAT,    Neck:  cervical lymphadenopathy, neck tenderness, mandibular tenderness trachea midline   Respiratory: Decreased breath sounds bilaterally, wheezing, nonlabored respirations    Cardiovascular: Tachycardia, no murmurs, rubs, or gallops, palpable pedal pulses  bilaterally   Gastrointestinal: Positive bowel sounds, soft, nontender, nondistended   Musculoskeletal: Bilateral lower extremity edema, no clubbing or cyanosis to extremities   Psychiatric: Appropriate affect, cooperative   Neurologic: Oriented x 3, strength symmetric in all extremities, Cranial Nerves grossly intact to confrontation, speech clear   Skin: Venous stasis dermatitis, open wounds, blistering                              Result Review   Result Review:  I have personally reviewed the results from the time of this admission to 3/18/2025 06:27 EDT and agree with these findings:  [x]  Laboratory list / accordion  []  Microbiology  [x]  Radiology  [x]  EKG/Telemetry   []  Cardiology/Vascular   []  Pathology  []  Old records  []  Other:  Most notable findings include: Labs showed significant hyperglycemia, and mild leukocytosis.  ABG showed compensated hypercapnia.  Chest x-ray showed mild infiltrates on the left versus the right lung base.       Assessment & Plan  Assessment / Plan     Brief Patient Summary:  Isha Llanes is a 59 y.o. female with past medical history of the heart currently, laryngeal cancer status post feeding tube, Hodgkin's lymphoma, squamous cell carcinoma, dysphagia, CHF, COPD, diabetes, hyperlipidemia, lupus, ABY, and GERD presented to the ED for evaluation of lethargy, worsening pain, and generalized weakness.    Active Hospital Problems:  Active Hospital Problems    Diagnosis     **Generalized weakness     Acute on chronic respiratory failure with hypoxia and hypercapnia     History of laryngeal cancer     Dysphagia     Malignant neoplasm of supraglottis     Hodgkin lymphoma     Tobacco abuse     Venous stasis ulcer of right calf     Cellulitis of lower extremity     Lupus (systemic lupus erythematosus)     Depression     GERD (gastroesophageal reflux disease)     CAD (coronary artery disease)     CHF (congestive heart failure)     Chronic obstructive pulmonary disease      Chronic pain syndrome     Cancer associated pain      Plan:     Generalized weakness  -Admitted telemetry  -Patient with lower extremity wounds, venous stasis, redness around feeding tube insertion  -Antibiotics given in ED, resume if warranted  -Patient with worsening metastatic disease  -Decrease fluid intake  -gentle hydration  -Fall precautions  -PT OT  -Placement if needed  -Supportive care    Hodgkin's lymphoma/laryngeal  -Patient follows up at [facility]  -Worsening metastatic disease noted  -PET scanning and biopsy recently done  -Patient with pain pump  -As needed pain meds  -Also oncology if warranted    Diabetes  -Insulin sliding scale  -Levemir at bedtime  -Titrate as needed    HTN  -Currently well controlled  -PRN BP meds  -Resume home meds when available  -Titrate if needed    Lower extremity cellulitis    COPD  ABY  Lupus  Aortic stenosis status post TAVR    GI ppx  DVT ppx      VTE Prophylaxis:  Pharmacologic VTE prophylaxis orders are present.        CODE STATUS:    Code Status (Patient has no pulse and is not breathing): CPR (Attempt to Resuscitate)  Medical Interventions (Patient has pulse or is breathing): Full Support  Level Of Support Discussed With: Patient    Admission Status:  I believe this patient meets inpatient status.      Electronically signed by Luis Pal MD, 03/18/25, 6:27 AM EDT.    Electronically signed by Luis Pal MD at 03/18/25 0659          Emergency Department Notes        Bethanie Allred MD at 03/17/25 [id number]          Time: 9:58 PM EDT  Date of encounter:  3/17/2025  Independent Historian/Clinical History and Information was obtained by:   Patient    History is limited by: N/A    Chief Complaint   Patient presents with    Abdominal Pain         History of Present Illness:  Patient is a 59 y.o. year old female who presents to the emergency department for evaluation of redness and pain around the feeding tube site for the past 3 days.  Patient  states that tube has been in place about 3 months.  Patient also complains of sores in her face.  LUZ [place]    Patient also reports bilateral lower extremity pain.  Family states she has been extremely weak and less responsive.  This is usually when she gets like this other his CO2 level is high or she is septic.  Patient not able to give much history at this time.  Patient does have a history of throat cancer.  Per daughter she was in remission but recently they found new cancer that has spread. she has not started chemotherapy yet.    Patient Care Team  Primary Care Provider: Virgil Salas MD    Past Medical History:     Allergies   Allergen Reactions    Amoxicillin Shortness Of Breath     Has tolerated Cefazolin, Ceftriaxone, and Cefepime -[facility]    Ceclor [Cefaclor] Shortness Of Breath     Has tolerated Cefazolin, Ceftriaxone, and Cefepime -[facility]      Penicillins Shortness Of Breath     Has tolerated Cefazolin, Ceftriaxone, and Cefepime -[facility], [facility]    Sulfa Antibiotics Rash    Valium [Diazepam] Mental Status Change     DEPRESSED    Ambien [Zolpidem] Mental Status Change    Aspirin GI Intolerance    Ativan [Lorazepam] Mental Status Change    Benadryl [Diphenhydramine] Anxiety     AND MAKES HER HYPER    Biaxin [Clarithromycin] Unknown - Low Severity    Cephalexin Unknown - Low Severity    Clindamycin Unknown - Low Severity    Compazine [Prochlorperazine Edisylate] Rash    Contrast Dye (Echo Or Unknown Ct/Mr) Other (See Comments)     Caused pain in her arm    Doxycycline Rash    Nsaids GI Intolerance    Phenergan [Promethazine Hcl] GI Intolerance    Promethazine GI Intolerance    Vancomycin Itching     Past Medical History:   Diagnosis Date    Anesthesia     REPORTS HAS GOT REAL EMOTIONAL AND HAS BECOME ANGRY BEFORE    Anxiety     Aortic stenosis, severe     HAS BIO VALVE REPLACED    Arthritis     Asthma     Back pain     Blood clotting disorder     Cancer      Cancer associated pain     HODGKINS LYMPHOMA    CHF (congestive heart failure)     Chronic low back pain with sciatica     Chronic pain disorder     COPD (chronic obstructive pulmonary disease)     Coronary artery disease     Diabetes mellitus     TYPE 2    Diabetes mellitus     Dyspnea on effort     GERD (gastroesophageal reflux disease)     H/O lumpectomy     H/O: hysterectomy     Hiatal hernia     History of degenerative disc disease     History of kidney stones     History of pulmonary embolus (PE)     History of transfusion     AS A CHILD NO TRANSFUSION REACTION    History of transfusion     Hodgkin's lymphoma     5/19/23  5 YEARS SINCE LAST CHEMO TX    Hypertension     Immobility     Liver disease     DENIES ANY CURRENT ISSUES    Lupus     Mitral valve prolapse     Nausea vomiting and diarrhea 3/7/2024    Panic disorder     Pelvic pain     Peripheral neuropathy     Personal history of DVT (deep vein thrombosis)     Presence of intrathecal pump     PTSD (post-traumatic stress disorder)     Sacroiliac joint disease     SI (sacroiliac) joint inflammation     Sleep apnea     Venous insufficiency      Past Surgical History:   Procedure Laterality Date    ABDOMINAL SURGERY      BACK SURGERY      SPINAL FUSION     BACK SURGERY      BLADDER REPAIR      CARDIAC CATHETERIZATION N/A 03/06/2019    Procedure: Valvuloplasty;  Surgeon: Wayne Johnson MD;  [facility] C[facility] INVASIVE LOC[facility];  Service: Cardiology    CARDIAC CATHETERIZATION      CARDIAC CATHETERIZATION Left 5/31/2023    Procedure: Cardiac Catheterization/Vascular Study;  Surgeon: Poncho Reid MD;  Location: [facility] CATH INVASIVE LOCATION;  Service: Cardiovascular;  Laterality: Left;    CARDIAC SURGERY      CARDIAC VALVE REPLACEMENT  2021    CHOLECYSTECTOMY      COLONOSCOPY N/A 12/29/2021    Procedure: COLONOSCOPY;  Surgeon: Karine Orlando MD;  Location: [facility] ENDOSCOPY;  Service: Gastroenterology;  Laterality: N/A;  POOR PREP     COLONOSCOPY N/A 04/13/2022    Procedure: COLONOSCOPY WITH POLYPECTOMY;  Surgeon: Karine Orlando MD;  Location: [facility] ENDOSCOPY;  Service: Gastroenterology;  Laterality: N/A;  COLON POLYP, HEMORRHOIDS, POOR PREP    COLONOSCOPY      DIRECT LARYNGOSCOPY, ESOPHAGOSCOPY, BRONCHOSCOPY N/A 5/22/2023    Procedure: DIRECT LARYNGOSCOPY WITH BIOPSIES , ESOPHAGOSCOPY, BRONCHOSCOPY;  Surgeon: Ronnie Mcgarry MD;  Location: [facility] OR [facility];  Service: ENT;  Laterality: N/A;    ENDOSCOPY N/A 12/29/2021    Procedure: ESOPHAGOGASTRODUODENOSCOPY;  Surgeon: Karine Orlando MD;  Location: [facility] ENDOSCOPY;  Service: Gastroenterology;  Laterality: N/A;  ESOPHAGITIS, GASTRITIS, HIATAL HERNIA    ENDOSCOPY      ENDOSCOPY N/A 4/16/2024    Procedure: ESOPHAGOGASTRODUODENOSCOPY WITH BIOPSIES;  Surgeon: Karine Orlando MD;  Location: [facility] ENDOSCOPY;  Service: Gastroenterology;  Laterality: N/A;  ESOPHAGITIS, HIATAL HERNIA    ENDOSCOPY W/ PEG TUBE PLACEMENT N/A 12/10/2024    Procedure: ESOPHAGOGASTRODUODENOSCOPY WITH PERCUTANEOUS ENDOSCOPIC GASTROSTOMY TUBE INSERTION WITH ANESTHESIA;  Surgeon: Rodger Ortega MD;  Location: [facility] ENDOSCOPY;  Service: Gastroenterology;  Laterality: N/A;  PEG TUBE INSERTION    HYSTERECTOMY      LYMPHADENECTOMY      PAIN PUMP INSERTION/REVISION N/A 10/18/2019    Procedure: PAIN PUMP INSERTION [facility] 10-18-19 [place];  Surgeon: Horace Rios MD;  Location: [facility] OR;  Service: Pain Management    PAIN PUMP INSERTION/REVISION N/A 11/23/2020    Procedure: pain pump removal;  Surgeon: Horace Rios MD;  Location: [facility] OR;  Service: Pain Management;  Laterality: N/A;    PEG TUBE INSERTION N/A 7/26/2023    Procedure: PERCUTANEOUS ENDOSCOPIC GASTROSTOMY TUBE INSERTION;  Surgeon: Kody Mercre MD;  Location: [facility] ENDOSCOPY;  Service: General;  Laterality: N/A;  SUCCESSFUL PEG TUBE PLACE PLACEMENT    PORTACATH PLACEMENT      IN LEFT CHEST REPORTS THAT IT IS  NOT FUNCTIONING    SKIN BIOPSY      TONSILLECTOMY      TUBAL ABDOMINAL LIGATION      TUMOR REMOVAL       Family History   Problem Relation Age of Onset    Heart failure Mother     Anxiety disorder Mother     Prostate cancer Father     Depression Sister     Bipolar disorder Sister     Pancreatic cancer Sister     ADD / ADHD Brother     Thyroid cancer Maternal Grandmother     Pancreatic cancer Paternal Grandmother     ADD / ADHD Grandson     ADD / ADHD Granddaughter     ADD / ADHD Nephew     Malig Hyperthermia Neg Hx        Home Medications:  Prior to Admission medications    Medication Sig Start Date End Date Taking? Authorizing Provider   acetaminophen (Tylenol) 325 MG tablet Take 1 tablet by mouth Every 6 (Six) Hours As Needed for Mild Pain, Headache or Fever.    Nette Jiménez MD   albuterol sulfate  (90 Base) MCG/ACT inhaler Inhale 2 puffs Every 4 (Four) Hours As Needed for Wheezing. 11/20/23   Katia Wright MD   ALPRAZolam (XANAX) 0.25 MG tablet Take 2 tablets by mouth 2 (Two) Times a Day As Needed. 11/25/24   Nette Jiménez MD   cetirizine (zyrTEC) 10 MG tablet Take 1 tablet by mouth Daily.  Patient not taking: Reported on 12/23/2024    Nette Jiménez MD   clopidogrel (PLAVIX) 75 MG tablet Take 1 tablet by mouth Daily.  Patient not taking: Reported on 12/23/2024 8/7/24   Katia Wright MD   mupirocin (BACTROBAN) 2 % ointment Apply 1 Application topically to the appropriate area as directed 3 (Three) Times a Day. 4/10/24   Katia Wright MD   naloxone (NARCAN) 4 MG/0.1ML nasal spray Call 911. Don't prime. [place] in 1 nostril for overdose. Repeat in 2-3 minutes in other nostril if no or minimal breathing/responsiveness. 12/12/24   Ruperto Abdi MD   pain patient supplied pump by Intrathecal route Continuous. Type of Medication: Hydromorphone 15 mg/ml and Bupivacaine 0.5 mg/ml  [employer] [phone]  Provider:Dr. Boudreaux  Provider number: [phone]  Basal Dose: Hydromorphone 7.518  "mg/day Bupivacaine 0.60396 mg/day  Pump capacity: 40ml  ( MAX of Hydromorphone 9.456 mg/ day; Bupivacaine 0.12730 mg /day)  Bolus Dose:Hydromorphone 0.500 mg, Bupivacaine 0.03946 mg  Max activations: 4 per day  Lockout 3 hours. 1 Bolus per every 3 hours   Next refill-  01/07/2025  Alarm date- 01/12/2025  Pump manufacture:MedManny Escobar MD   predniSONE (DELTASONE) 20 MG tablet Take 2 tablets by mouth 2 (Two) Times a Day. 12/23/24   Goyo Nunez MD        Social History:   Social History     Tobacco Use    Smoking status: Every Day     Current packs/day: 1.50     Average packs/day: 1.5 packs/day for 46.2 years (69.3 ttl pk-yrs)     Types: Cigarettes     Start date: 1979     Passive exposure: Current    Smokeless tobacco: Never   Vaping Use    Vaping status: Never Used   Substance Use Topics    Alcohol use: Never    Drug use: Never         Review of Systems:  Review of Systems   Unable to perform ROS: Mental status change   Respiratory:  Positive for shortness of breath.    Cardiovascular:  Positive for leg swelling.   Skin:  Positive for color change.   Psychiatric/Behavioral:  Positive for confusion.         Physical Exam:  BP 94/67 (Patient Position: Lying)   Pulse 97   Temp 98.2 °F (36.8 °C) (Oral)   Resp 14   Ht 167.6 cm (65.98\")   Wt 63.8 kg (140 lb 10.5 oz)   LMP  (LMP Unknown)   SpO2 94%   BMI 22.71 kg/m²         Physical Exam  Constitutional:       Appearance: She is ill-appearing.      Comments: Patient is very somnolent and not consistently answering questions.   HENT:      Head: Normocephalic.      Mouth/Throat:      Mouth: Mucous membranes are moist.   Eyes:      Pupils: Pupils are equal, round, and reactive to light.   Cardiovascular:      Rate and Rhythm: Tachycardia present.   Pulmonary:      Effort: Pulmonary effort is normal.      Breath sounds: Wheezing present.   Abdominal:      General: There is no distension.      Tenderness: There is no abdominal tenderness.      " Comments: G-tube with mild surrounding erythema.   Musculoskeletal:      Cervical back: Neck supple.   Skin:     General: Skin is warm and dry.      Comments: Bilateral lower extremity edema, color change bilateral lower extremities.  Multiple superficial wounds to bilateral lower extremities.   Neurological:      General: No focal deficit present.      Mental Status: She is oriented to person, place, and time.   Psychiatric:         Mood and Affect: Mood normal.         Behavior: Behavior normal.                            Medical Decision Making:      Comorbidities that affect care:    Cancer, Congestive Heart Failure, COPD, Diabetes, Hypertension    External Notes reviewed:    Previous Clinic Note: Patient seen by wound care on 2/3/2025 for venous stasis of both lower extremities, open wounds of right lower extremity, open was left lower extremity, squamous cell carcinoma of larynx, tobacco dependence, uncontrolled type 2 diabetes.      The following orders were placed and all results were independently analyzed by me:  Orders Placed This Encounter   Procedures    Blood Culture - Blood,    Blood Culture - Blood,    XR Chest 1 View    Comprehensive Metabolic Panel    [place] Draw    CBC Auto Differential    Urinalysis With Microscopic If Indicated (No Culture) - Urine, Clean Catch    Urinalysis, Microscopic Only - Urine, Clean Catch    Arterial Blood Gas,H&H,Lytes,Lactate    Lactic Acid, Plasma    Lipase    High Sensitivity Troponin T    BNP    Blood Gas, Arterial -    High Sensitivity Troponin T 1Hr    Inpatient Hospitalist Consult    POC Chem Panel    POC Lactate    Inpatient Admission    CBC & Differential    Green Top (Gel)    Lavender Top    Gold Top - SST    Light Blue Top       Medications Given in the Emergency Department:  Medications   cefepime 2000 mg IVPB in 100 mL NS (VTB) (has no administration in time range)   ipratropium-albuterol (DUO-NEB) nebulizer solution 3 mL (3 mL Nebulization Given 3/18/25  0135)   ipratropium-albuterol (DUO-NEB) nebulizer solution 3 mL (3 mL Nebulization Given 3/18/25 0149)   sodium chloride 0.9 % bolus 1,000 mL (0 mL Intravenous Stopped 3/18/25 0243)        ED Course:    The patient was initially evaluated in the triage area where orders were placed. The patient was later dispositioned by Bethanie Allred MD.      The patient was advised to stay for completion of workup which includes but is not limited to communication of labs and radiological results, reassessment and plan. The patient was advised that leaving prior to disposition by a provider could result in critical findings that are not communicated to the patient.          Labs:    Lab Results (last 24 hours)       Procedure Component Value Units Date/Time    CBC & Differential [[id number]]  (Abnormal) Collected: 03/17/25 2209    Specimen: Blood from Arm, Left Updated: 03/17/25 2218    Narrative:      The following orders were created for panel order CBC & Differential.  Procedure                               Abnormality         Status                     ---------                               -----------         ------                     CBC Auto Differential[[id number]]        Abnormal            Final result                 Please view results for these tests on the individual orders.    Comprehensive Metabolic Panel [[id number]]  (Abnormal) Collected: 03/17/25 2209    Specimen: Blood from Arm, Left Updated: 03/17/25 2303     Glucose 470 mg/dL      BUN 17 mg/dL      Creatinine 0.59 mg/dL      Sodium 133 mmol/L      Potassium 4.9 mmol/L      Chloride 94 mmol/L      CO2 27.9 mmol/L      Calcium 9.2 mg/dL      Total Protein 6.4 g/dL      Albumin 3.8 g/dL      ALT (SGPT) 21 U/L      AST (SGOT) 15 U/L      Alkaline Phosphatase 71 U/L      Total Bilirubin 0.2 mg/dL      Globulin 2.6 gm/dL      A/G Ratio 1.5 g/dL      BUN/Creatinine Ratio 28.8     Anion Gap 11.1 mmol/L      eGFR 104.0 mL/min/1.73     Narrative:      GFR  Categories in Chronic Kidney Disease (CKD)      GFR Category          GFR (mL/min/1.73)    Interpretation  G1                     90 or greater         Normal or high (1)  G2                      60-89                Mild decrease (1)  G3a                   45-59                Mild to moderate decrease  G3b                   30-44                Moderate to severe decrease  G4                    15-29                Severe decrease  G5                    14 or less           Kidney failure          (1)In the absence of evidence of kidney disease, neither GFR category G1 or G2 fulfill the criteria for CKD.    eGFR calculation 2021 CKD-EPI creatinine equation, which does not include race as a factor    CBC Auto Differential [[id number]]  (Abnormal) Collected: 03/17/25 2209    Specimen: Blood from Arm, Left Updated: 03/17/25 2218     WBC 11.07 10*3/mm3      RBC 4.87 10*6/mm3      Hemoglobin 12.8 g/dL      Hematocrit 44.0 %      MCV 90.3 fL      MCH 26.3 pg      MCHC 29.1 g/dL      RDW 15.3 %      RDW-SD 50.2 fl      MPV 10.3 fL      Platelets 190 10*3/mm3      Neutrophil % 90.9 %      Lymphocyte % 4.6 %      Monocyte % 3.2 %      Eosinophil % 0.2 %      Basophil % 0.3 %      Immature Grans % 0.8 %      Neutrophils, Absolute 10.07 10*3/mm3      Lymphocytes, Absolute 0.51 10*3/mm3      Monocytes, Absolute 0.35 10*3/mm3      Eosinophils, Absolute 0.02 10*3/mm3      Basophils, Absolute 0.03 10*3/mm3      Immature Grans, Absolute 0.09 10*3/mm3      nRBC 0.0 /100 WBC     Lipase [[id number]]  (Normal) Collected: 03/17/25 2209    Specimen: Blood from Arm, Left Updated: 03/18/25 0114     Lipase 41 U/L     BNP [[id number]]  (Normal) Collected: 03/17/25 2209    Specimen: Blood from Arm, Left Updated: 03/18/25 0118     proBNP 405.6 pg/mL     Narrative:      This assay is used as an aid in the diagnosis of individuals suspected of having heart failure. It can be used as an aid in the diagnosis of acute decompensated heart failure  (ADHF) in patients presenting with signs and symptoms of ADHF to the emergency department (ED). In addition, NT-proBNP of <300 pg/mL indicates ADHF is not likely.    Age Range Result Interpretation  NT-proBNP Concentration (pg/mL:      <50             Positive            >450                   Gray                 300-450                    Negative             <300    50-75           Positive            >900                  Gray                300-900                  Negative            <300      >75             Positive            >1800                  Gray                300-1800                  Negative            <300    Urinalysis With Microscopic If Indicated (No Culture) - [[id number]]  (Abnormal) Collected: 03/17/25 2233    Specimen: Urine Updated: 03/17/25 2321     Color, UA Yellow     Appearance, UA Clear     pH, UA 6.5     Specific Gravity, UA 1.030     Glucose, UA >=1000 mg/dL (3+)     Ketones, UA Negative     Bilirubin, UA Negative     Blood, UA Negative     Protein, UA Negative     Leuk Esterase, UA Small (1+)     Nitrite, UA Negative     Urobilinogen, UA 0.2 E.U./dL    Urinalysis, Microscopic Only - Urine, Clean Catch [[id number]]  (Abnormal) Collected: 03/17/25 2233    Specimen: Urine Updated: 03/17/25 2321     RBC, UA 0-2 /HPF      WBC, UA 11-20 /HPF      Bacteria, UA None Seen /HPF      Squamous Epithelial Cells, UA 0-2 /HPF      Hyaline Casts, UA None Seen /LPF      Methodology Automated Microscopy    Lactic Acid, Plasma [[id number]]  (Normal) Collected: 03/18/25 0117    Specimen: Blood from Arm, Right Updated: 03/18/25 0144     Lactate 1.9 mmol/L     High Sensitivity Troponin T [[id number]]  (Abnormal) Collected: 03/18/25 0117    Specimen: Blood from Arm, Right Updated: 03/18/25 0144     HS Troponin T 18 ng/L     Narrative:      High Sensitive Troponin T Reference Range:  <14.0 ng/L- Negative Female for AMI  <22.0 ng/L- Negative Male for AMI  >=14 - Abnormal Female indicating possible myocardial  injury.  >=22 - Abnormal Male indicating possible myocardial injury.   Clinicians would have to utilize clinical acumen, EKG, Troponin, and serial changes to determine if it is an Acute Myocardial Infarction or myocardial injury due to an underlying chronic condition.         POC Chem Panel [[id number]]  (Abnormal) Collected: 03/18/25 0137    Specimen: Arterial Blood Updated: 03/18/25 0140     Glucose 417 mg/dL      Comment: Serial Number: [id number]:  [id number]        Sodium 140 mmol/L      POC Potassium 4.1 mmol/L      Ionized Calcium 1.20 mmol/L      Chloride 98 mmol/L      Creatinine <0.30 mg/dL      BUN 18 mg/dL      CO2 Content >34.54 mmol/L     Blood Gas, Arterial - [[id number]]  (Abnormal) Collected: 03/18/25 0137    Specimen: Arterial Blood Updated: 03/18/25 0140     Site Left Brachial     Shane's Test N/A     pH, Arterial 7.384 pH units      pCO2, Arterial 59.3 mm Hg      pO2, Arterial 66.9 mm Hg      HCO3, Arterial 35.4 mmol/L      Base Excess, Arterial 8.3 mmol/L      Comment: Serial Number: [id number]:  [id number]        O2 Saturation, Arterial 92.0 %      Hemoglobin, Blood Gas 12.9 g/dL      Hematocrit, Blood Gas 38.0 %      Barometric Pressure for Blood Gas 748.0000 mmHg      Modality Room Air     Hemodilution No    POC Lactate [[id number]]  (Normal) Collected: 03/18/25 0137    Specimen: Arterial Blood Updated: 03/18/25 0140     Lactate 1.4 mmol/L      Comment: Serial Number: [id number]:  [id number]                Imaging:    XR Chest 1 View  Result Date: 3/18/2025  AP PORTABLE CHEST  HISTORY: Cough with difficulty breathing.  COMPARISON: 2/8/2025  TECHNIQUE: AP portable chest x-ray.  FINDINGS: Port-A-Cath tip remains in the SVC. Cardiac and mediastinal contours are normal. Patient is status post TAVR. There are granulomatous calcifications at the right lung base. There is some mild infiltrate or atelectasis at the right base as well. Lungs are otherwise clear. No pneumothorax identified.      Mild  infiltrate or atelectasis at the right lung base.  3/18/2025 1:29 AM by Dr. Des Carlson MD on Workstation: [id number]          Differential Diagnosis and Discussion:      Altered Mental Status: Based on the patient's signs and symptoms, differential diagnosis includes but is not limited to meningitis, stroke, sepsis, subarachnoid hemorrhage, intracranial bleeding, encephalitis, and metabolic encephalopathy.    PROCEDURES:    Labs were collected in the emergency department and all labs were reviewed and interpreted by me.  X-ray were performed in the emergency department and all X-ray impressions were independently interpreted by me.  An EKG was performed and the EKG was interpreted by me.    No orders to display        Procedures    MDM  Number of Diagnoses or Management Options  Diagnosis management comments: On arrival patient is somnolent.  She has erythema and venous stasis bilateral lower extremities with multiple wounds.  Concern for possible secondary infection.  Labs showed elevated pCO2 of 59.3 with a pO2 of 66.9.  Blood sugar also elevated 470.  Patient was given IV fluids.  Patient was given dose of antibiotics.  Discussed patient with hospitalist and will admit for further care       Amount and/or Complexity of Data Reviewed  Clinical lab tests: reviewed  Tests in the radiology section of CPT®: reviewed  Review and summarize past medical records: yes  Independent visualization of images, tracings, or specimens: yes    Risk of Complications, Morbidity, and/or Mortality  Presenting problems: moderate  Management options: moderate                     Patient Care Considerations:    STEROIDS: I considered prescribing steroids, however I did not as this may adversely affect the patient's blood sugar in the setting of diabetes.      Consultants/Shared Management Plan:    Hospitalist: I have discussed the case with Dr Pal who agrees to accept the patient for admission.    Social Determinants of  Health:    Patient has presented with family members who are responsible, reliable and will ensure follow up care.      Disposition and Care Coordination:    Admit:   Through independent evaluation of the patient's history, physical, and imperical data, the patient meets criteria for inpatient admission to the hospital.        Final diagnoses:   Weakness generalized   Cellulitis of lower extremity, unspecified laterality   Acute on chronic respiratory failure with hypoxia and hypercapnia        ED Disposition       ED Disposition   Decision to Admit    Condition   --    Comment   Level of Care: Remote Telemetry [26]  Diagnosis: Generalized weakness [120955]  Admitting Physician: DAYRON BRIDGES [[id number]]  Certification: I Certify That Inpatient Hospital Services Are Medically Necessary For Greater Than 2 Midnights                 This medical record created using voice recognition software.             Bethanie Allred MD  03/18/25 0246      Electronically signed by Bethanie Allred MD at 03/18/25 0246       Vital Signs (last day) before discharge       Date/Time Temp Temp src Pulse Resp BP Patient Position SpO2    03/18/25 0730 98.1 (36.7) Oral 105 18 91/61 Lying 90    03/18/25 0355 98.1 (36.7) Oral 91 18 111/66 Lying 96    03/18/25 0200 -- -- 97 14 94/67 Lying 94    03/18/25 0150 -- -- -- 14 -- -- --    03/18/25 0135 -- -- -- 14 -- -- --    03/18/25 0030 -- -- 104 -- 106/75 -- 94    03/17/25 2341 -- -- 111 14 110/72 Lying 92    03/17/25 2329 -- -- 132 -- -- -- 92    03/17/25 2158 98.2 (36.8) Oral 116 20 113/73 -- 97          Oxygen Therapy (last day) before discharge       Date/Time SpO2 Device (Oxygen Therapy) Flow (L/min) (Oxygen Therapy) Oxygen Concentration (%) ETCO2 (mmHg)    03/18/25 0730 90 room air -- -- --    03/18/25 0357 -- room air -- -- --    03/18/25 0355 96 -- -- -- --    03/18/25 0200 94 room air -- -- --    03/18/25 0150 -- room air -- -- --    03/18/25 0135 -- room air -- -- --    03/18/25  0030 94 -- -- -- --    03/17/25 2341 92 room air -- -- --    03/17/25 2329 92 -- -- -- --    03/17/25 2158 97 room air -- -- --          Facility-Administered Medications as of 3/18/2025   Medication Dose Route Frequency Provider Last Rate Last Admin    [Held by provider] ALPRAZolam (XANAX) tablet 0.5 mg  0.5 mg Per PEG Tube BID PRN Luis Pal MD        sennosides-docusate (PERICOLACE) 8.6-50 MG per tablet 2 tablet  2 tablet Per PEG Tube BID PRN Luis Pal MD        And    polyethylene glycol (MIRALAX) packet 17 g  17 g Per PEG Tube Daily PRN Luis Pal MD        And    bisacodyl (DULCOLAX) suppository 10 mg  10 mg Rectal Daily PRN Luis Pal MD        [COMPLETED] cefepime 2000 mg IVPB in 100 mL NS (VTB)  2,000 mg Intravenous Once Bethanie Allred MD   2,000 mg at 03/18/25 0256    [START ON 3/19/2025] cetirizine (zyrTEC) tablet 10 mg  10 mg Per PEG Tube Daily Luis Pal MD        [START ON 3/19/2025] clopidogrel (PLAVIX) tablet 75 mg  75 mg Per PEG Tube Daily Luis Pal MD        dextrose (D50W) (25 g/50 mL) IV injection 25 g  25 g Intravenous Q15 Min PRN Luis Pal MD        dextrose (GLUTOSE) oral gel 15 g  15 g Oral Q15 Min PRN Luis Pal MD        enoxaparin sodium (LOVENOX) syringe 40 mg  40 mg Subcutaneous Daily Luis Pal MD   40 mg at 03/18/25 0815    [Held by provider] furosemide (LASIX) tablet 20 mg  20 mg Per PEG Tube Daily Luis Pal MD        glucagon (GLUCAGEN) injection 1 mg  1 mg Intramuscular Q15 Min PRN Luis Pal MD        insulin glargine (LANTUS, SEMGLEE) injection 15 Units  15 Units Subcutaneous Daily Geraldo Gould DO   15 Units at 03/18/25 0815    Insulin Lispro (humaLOG) injection 2-9 Units  2-9 Units Subcutaneous 4x Daily AC & at Bedtime Luis Pal MD   4 Units at 03/18/25 0815    [COMPLETED] ipratropium-albuterol (DUO-NEB) nebulizer solution 3 mL  3 mL Nebulization Once Bethanie Allred MD   3 mL at 03/18/25  0135    [COMPLETED] ipratropium-albuterol (DUO-NEB) nebulizer solution 3 mL  3 mL Nebulization Once Bethanie Allred MD   3 mL at 03/18/25 0149    ipratropium-albuterol (DUO-NEB) nebulizer solution 3 mL  3 mL Nebulization Q4H PRN Luis Pal MD        naloxone (NARCAN) injection 0.4 mg  0.4 mg Intravenous PRN Luis Pal MD        ondansetron (ZOFRAN) injection 4 mg  4 mg Intravenous Q6H PRN Luis Pal MD        Patient Supplied Pain Pump   Intrathecal Continuous Luis Pal MD   Currently Infusing at 03/18/25 0429    [COMPLETED] sodium chloride 0.9 % bolus 1,000 mL  1,000 mL Intravenous Once Bethanie Allred MD   Stopped at 03/18/25 0243    [COMPLETED] sodium chloride 0.9 % bolus 500 mL  500 mL Intravenous Once Geraldo Gould,  mL/hr at 03/18/25 0815 500 mL at 03/18/25 0815    sodium chloride 0.9 % flush 10 mL  10 mL Intravenous Q12H Luis Pal MD        sodium chloride 0.9 % flush 10 mL  10 mL Intravenous PRN Luis Pal MD        sodium chloride 0.9 % infusion 40 mL  40 mL Intravenous PRN Luis Pal MD        sodium chloride 0.9 % infusion  100 mL/hr Intravenous Continuous Luis Pal  mL/hr at 03/18/25 0816 100 mL/hr at 03/18/25 0816     Lab Results (last 24 hours)       Procedure Component Value Units Date/Time    Procalcitonin [[id number]]  (Normal) Collected: 03/18/25 0533    Specimen: Blood from Hand, Left Updated: 03/18/25 0836     Procalcitonin 0.09 ng/mL     Narrative:      As a Marker for Sepsis (Non-Neonates):    1. <0.5 ng/mL represents a low risk of severe sepsis and/or septic shock.  2. >2 ng/mL represents a high risk of severe sepsis and/or septic shock.    As a Marker for Lower Respiratory Tract Infections that require antibiotic therapy:    PCT on Admission    Antibiotic Therapy       6-12 Hrs later    >0.5                Strongly Recommended  >0.25 - <0.5        Recommended  0.1 - 0.25          Discouraged              Remeasure/reassess  "PCT  <0.1                Strongly Discouraged     Remeasure/reassess PCT    As 28 day mortality risk marker: \"Change in Procalcitonin Result\" (>80% or <=80%) if Day 0 (or Day 1) and Day 4 values are available. Refer to [email or web address]    Change in PCT <=80%  A decrease of PCT levels below or equal to 80% defines a positive change in PCT test result representing a higher risk for 28-day all-cause mortality of patients diagnosed with severe sepsis for septic shock.    Change in PCT >80%  A decrease of PCT levels of more than 80% defines a negative change in PCT result representing a lower risk for 28-day all-cause mortality of patients diagnosed with severe sepsis or septic shock.    This test is Prognostic not Diagnostic, if elevated correlate with clinical findings before administering antibiotic treatment.        Magnesium [[id number]]  (Normal) Collected: 03/18/25 0533    Specimen: Blood from Hand, Left Updated: 03/18/25 0823     Magnesium 1.8 mg/dL     POC Glucose 4x Daily Before Meals & at Bedtime [[id number]]  (Abnormal) Collected: 03/18/25 0728    Specimen: Blood Updated: 03/18/25 0730     Glucose 217 mg/dL      Comment: Serial Number: [id number]:  [id number]       Basic Metabolic Panel [[id number]]  (Abnormal) Collected: 03/18/25 0533    Specimen: Blood from Hand, Left Updated: 03/18/25 0624     Glucose 259 mg/dL      BUN 14 mg/dL      Creatinine 0.38 mg/dL      Sodium 137 mmol/L      Potassium 4.4 mmol/L      Chloride 101 mmol/L      CO2 28.5 mmol/L      Calcium 8.4 mg/dL      BUN/Creatinine Ratio 36.8     Anion Gap 7.5 mmol/L      eGFR 115.6 mL/min/1.73     Narrative:      GFR Categories in Chronic Kidney Disease (CKD)      GFR Category          GFR (mL/min/1.73)    Interpretation  G1                     90 or greater         Normal or high (1)  G2                      60-89                Mild decrease (1)  G3a                   45-59                Mild to moderate decrease  G3b    "                30-44                Moderate to severe decrease  G4                    15-29                Severe decrease  G5                    14 or less           Kidney failure          (1)In the absence of evidence of kidney disease, neither GFR category G1 or G2 fulfill the criteria for CKD.    eGFR calculation 2021 CKD-EPI creatinine equation, which does not include race as a factor    CBC (No Diff) [[id number]]  (Abnormal) Collected: 03/18/25 0533    Specimen: Blood from Hand, Left Updated: 03/18/25 0552     WBC 9.02 10*3/mm3      RBC 4.50 10*6/mm3      Hemoglobin 12.2 g/dL      Hematocrit 41.1 %      MCV 91.3 fL      MCH 27.1 pg      MCHC 29.7 g/dL      RDW 15.0 %      RDW-SD 50.6 fl      MPV 9.8 fL      Platelets 179 10*3/mm3     High Sensitivity Troponin T 1Hr [[id number]]  (Abnormal) Collected: 03/18/25 0247    Specimen: Blood Updated: 03/18/25 0312     HS Troponin T 18 ng/L      Troponin T Numeric Delta 0 ng/L      Troponin T % Delta 0    Narrative:      High Sensitive Troponin T Reference Range:  <14.0 ng/L- Negative Female for AMI  <22.0 ng/L- Negative Male for AMI  >=14 - Abnormal Female indicating possible myocardial injury.  >=22 - Abnormal Male indicating possible myocardial injury.   Clinicians would have to utilize clinical acumen, EKG, Troponin, and serial changes to determine if it is an Acute Myocardial Infarction or myocardial injury due to an underlying chronic condition.         Blood Culture - Blood, Arm, Left [[id number]] Collected: 03/18/25 0247    Specimen: Blood from Arm, Left Updated: 03/18/25 0250    Blood Culture - Blood, Arm, Left [[id number]] Collected: 03/18/25 0247    Specimen: Blood from Arm, Left Updated: 03/18/25 0250    High Sensitivity Troponin T [[id number]]  (Abnormal) Collected: 03/18/25 0117    Specimen: Blood from Arm, Right Updated: 03/18/25 0144     HS Troponin T 18 ng/L     Narrative:      High Sensitive Troponin T Reference Range:  <14.0 ng/L- Negative Female for  AMI  <22.0 ng/L- Negative Male for AMI  >=14 - Abnormal Female indicating possible myocardial injury.  >=22 - Abnormal Male indicating possible myocardial injury.   Clinicians would have to utilize clinical acumen, EKG, Troponin, and serial changes to determine if it is an Acute Myocardial Infarction or myocardial injury due to an underlying chronic condition.         Lactic Acid, Plasma [[id number]]  (Normal) Collected: 03/18/25 0117    Specimen: Blood from Arm, Right Updated: 03/18/25 0144     Lactate 1.9 mmol/L     POC Chem Panel [[id number]]  (Abnormal) Collected: 03/18/25 0137    Specimen: Arterial Blood Updated: 03/18/25 0140     Glucose 417 mg/dL      Comment: Serial Number: [id number]:  [id number]        Sodium 140 mmol/L      POC Potassium 4.1 mmol/L      Ionized Calcium 1.20 mmol/L      Chloride 98 mmol/L      Creatinine <0.30 mg/dL      BUN 18 mg/dL      CO2 Content >34.54 mmol/L     Blood Gas, Arterial - [[id number]]  (Abnormal) Collected: 03/18/25 0137    Specimen: Arterial Blood Updated: 03/18/25 0140     Site Left Brachial     Shane's Test N/A     pH, Arterial 7.384 pH units      pCO2, Arterial 59.3 mm Hg      pO2, Arterial 66.9 mm Hg      HCO3, Arterial 35.4 mmol/L      Base Excess, Arterial 8.3 mmol/L      Comment: Serial Number: [id number]:  [id number]        O2 Saturation, Arterial 92.0 %      Hemoglobin, Blood Gas 12.9 g/dL      Hematocrit, Blood Gas 38.0 %      Barometric Pressure for Blood Gas 748.0000 mmHg      Modality Room Air     Hemodilution No    POC Lactate [[id number]]  (Normal) Collected: 03/18/25 0137    Specimen: Arterial Blood Updated: 03/18/25 0140     Lactate 1.4 mmol/L      Comment: Serial Number: [id number]:  [id number]       BNP [[id number]]  (Normal) Collected: 03/17/25 2209    Specimen: Blood from Arm, Left Updated: 03/18/25 0118     proBNP 405.6 pg/mL     Narrative:      This assay is used as an aid in the diagnosis of individuals suspected of having heart failure. It can be used as  an aid in the diagnosis of acute decompensated heart failure (ADHF) in patients presenting with signs and symptoms of ADHF to the emergency department (ED). In addition, NT-proBNP of <300 pg/mL indicates ADHF is not likely.    Age Range Result Interpretation  NT-proBNP Concentration (pg/mL:      <50             Positive            >450                   Gray                 300-450                    Negative             <300    50-75           Positive            >900                  Gray                300-900                  Negative            <300      >75             Positive            >1800                  Gray                300-1800                  Negative            <300    Lipase [[id number]]  (Normal) Collected: 03/17/25 2209    Specimen: Blood from Arm, Left Updated: 03/18/25 0114     Lipase 41 U/L     [place] [facility] [[id number]] Collected: 03/17/25 2209    Specimen: Blood Updated: 03/18/25 0030    Narrative:      The following orders were created for panel order [place] [facility].  Procedure                               Abnormality         Status                     ---------                               -----------         ------                     Green Top (Gel)[[id number]]                                  Final result               Lavender Top[[id number]]                                     Final result               Gold Top - SST[[id number]]                                   Final result               Light Blue Top[[id number]]                                   Final result                 Please view results for these tests on the individual orders.    Light Blue Top [[id number]] Collected: 03/18/25 0009    Specimen: Blood Updated: 03/18/25 0030     Extra Tube Hold for add-ons.     Comment: Auto resulted       Urinalysis, Microscopic Only - Urine, Clean Catch [[id number]]  (Abnormal) Collected: 03/17/25 2233    Specimen: Urine Updated: 03/17/25 2321     RBC, UA 0-2 /HPF      WBC, UA 11-20 /HPF       Bacteria, UA None Seen /HPF      Squamous Epithelial Cells, UA 0-2 /HPF      Hyaline Casts, UA None Seen /LPF      Methodology Automated Microscopy    Urinalysis With Microscopic If Indicated (No Culture) - [[id number]]  (Abnormal) Collected: 03/17/25 2233    Specimen: Urine Updated: 03/17/25 2321     Color, UA Yellow     Appearance, UA Clear     pH, UA 6.5     Specific Gravity, UA 1.030     Glucose, UA >=1000 mg/dL (3+)     Ketones, UA Negative     Bilirubin, UA Negative     Blood, UA Negative     Protein, UA Negative     Leuk Esterase, UA Small (1+)     Nitrite, UA Negative     Urobilinogen, UA 0.2 E.U./dL    Comprehensive Metabolic Panel [[id number]]  (Abnormal) Collected: 03/17/25 2209    Specimen: Blood from Arm, Left Updated: 03/17/25 2303     Glucose 470 mg/dL      BUN 17 mg/dL      Creatinine 0.59 mg/dL      Sodium 133 mmol/L      Potassium 4.9 mmol/L      Chloride 94 mmol/L      CO2 27.9 mmol/L      Calcium 9.2 mg/dL      Total Protein 6.4 g/dL      Albumin 3.8 g/dL      ALT (SGPT) 21 U/L      AST (SGOT) 15 U/L      Alkaline Phosphatase 71 U/L      Total Bilirubin 0.2 mg/dL      Globulin 2.6 gm/dL      A/G Ratio 1.5 g/dL      BUN/Creatinine Ratio 28.8     Anion Gap 11.1 mmol/L      eGFR 104.0 mL/min/1.73     Narrative:      GFR Categories in Chronic Kidney Disease (CKD)      GFR Category          GFR (mL/min/1.73)    Interpretation  G1                     90 or greater         Normal or high (1)  G2                      60-89                Mild decrease (1)  G3a                   45-59                Mild to moderate decrease  G3b                   30-44                Moderate to severe decrease  G4                    15-29                Severe decrease  G5                    14 or less           Kidney failure          (1)In the absence of evidence of kidney disease, neither GFR category G1 or G2 fulfill the criteria for CKD.    eGFR calculation 2021 CKD-EPI creatinine equation, which does not include  race as a factor    Lavender Top [[id number]] Collected: 03/17/25 2209    Specimen: Blood from Arm, Left Updated: 03/17/25 2231     Extra Tube hold for add-on     Comment: Auto resulted       Gold Top - SST [[id number]] Collected: 03/17/25 2209    Specimen: Blood from Arm, Left Updated: 03/17/25 2231     Extra Tube Hold for add-ons.     Comment: Auto resulted.       Green Top (Gel) [[id number]] Collected: 03/17/25 2209    Specimen: Blood from Arm, Left Updated: 03/17/25 2231     Extra Tube Hold for add-ons.     Comment: Auto resulted.       CBC & Differential [[id number]]  (Abnormal) Collected: 03/17/25 2209    Specimen: Blood from Arm, Left Updated: 03/17/25 2218    Narrative:      The following orders were created for panel order CBC & Differential.  Procedure                               Abnormality         Status                     ---------                               -----------         ------                     CBC Auto Differential[[id number]]        Abnormal            Final result                 Please view results for these tests on the individual orders.    CBC Auto Differential [[id number]]  (Abnormal) Collected: 03/17/25 2209    Specimen: Blood from Arm, Left Updated: 03/17/25 2218     WBC 11.07 10*3/mm3      RBC 4.87 10*6/mm3      Hemoglobin 12.8 g/dL      Hematocrit 44.0 %      MCV 90.3 fL      MCH 26.3 pg      MCHC 29.1 g/dL      RDW 15.3 %      RDW-SD 50.2 fl      MPV 10.3 fL      Platelets 190 10*3/mm3      Neutrophil % 90.9 %      Lymphocyte % 4.6 %      Monocyte % 3.2 %      Eosinophil % 0.2 %      Basophil % 0.3 %      Immature Grans % 0.8 %      Neutrophils, Absolute 10.07 10*3/mm3      Lymphocytes, Absolute 0.51 10*3/mm3      Monocytes, Absolute 0.35 10*3/mm3      Eosinophils, Absolute 0.02 10*3/mm3      Basophils, Absolute 0.03 10*3/mm3      Immature Grans, Absolute 0.09 10*3/mm3      nRBC 0.0 /100 WBC           Imaging Results (Last 24 Hours)       Procedure Component Value Units Date/Time  "   XR Chest 1 View [[id number]] Collected: 03/18/25 0127     Updated: 03/18/25 0131    Narrative:      AP PORTABLE CHEST     HISTORY:  Cough with difficulty breathing.     COMPARISON:  2/8/2025     TECHNIQUE:  AP portable chest x-ray.     FINDINGS:  Port-A-Cath tip remains in the SVC. Cardiac and mediastinal contours are  normal. Patient is status post TAVR. There are granulomatous  calcifications at the right lung base. There is some mild infiltrate or  atelectasis at the right base as well. Lungs are otherwise clear. No  pneumothorax identified.       Impression:      Mild infiltrate or atelectasis at the right lung base.     3/18/2025 1:29 AM by Dr. Des Carlson MD on Workstation: [id number]                      Orders (active)        Start     Ordered    03/19/25 0900  cetirizine (zyrTEC) tablet 10 mg  Daily         03/18/25 0833    03/19/25 0900  clopidogrel (PLAVIX) tablet 75 mg  Daily         03/18/25 0837    03/19/25 0600  CBC (No Diff)  Morning Draw         03/18/25 0809    03/19/25 0600  Basic Metabolic Panel  Morning Draw         03/18/25 0809    03/18/25 0930  [Held by provider]  furosemide (LASIX) tablet 20 mg  Daily        (On hold since today at 0806 until manually unheld; held by Luis Pal MDHold Reason: Other (Comment Required))    03/18/25 0836    03/18/25 0900  sodium chloride 0.9 % flush 10 mL  Every 12 Hours Scheduled         03/18/25 0346    03/18/25 0900  enoxaparin sodium (LOVENOX) syringe 40 mg  Daily         03/18/25 0350    03/18/25 0900  insulin glargine (LANTUS, SEMGLEE) injection 15 Units  Daily         03/18/25 0803    03/18/25 0832  sennosides-docusate (PERICOLACE) 8.6-50 MG per tablet 2 tablet  2 Times Daily PRN        Placed in \"And\" Linked Group    03/18/25 0833    03/18/25 0832  polyethylene glycol (MIRALAX) packet 17 g  Daily PRN        Placed in \"And\" Linked Group    03/18/25 0833    03/18/25 0832  bisacodyl (DULCOLAX) suppository 10 mg  Daily PRN        Placed in \"And\" " Linked Group    03/18/25 0833    03/18/25 0832  [Held by provider]  ALPRAZolam (XANAX) tablet 0.5 mg  2 Times Daily PRN        (On hold since today at 0806 until manually unheld; held by Luis Pal MDHold Reason: Other (Comment Required)Hold Comment: Per physician)    03/18/25 0833    03/18/25 0820  Cardiac Monitoring  Continuous        Comments: Follow Standing Orders As Outlined in Process Instructions (Open Order Report to View Full Instructions)    03/18/25 0819    03/18/25 0808  NIPPV - Provider Settings  Until Discontinued         03/18/25 0809    03/18/25 0808  Continuous Pulse Oximetry  Continuous         03/18/25 0809    03/18/25 0802  Diet: Diabetic; Consistent Carbohydrate; Fluid Consistency: Thin (IDDSI 0)  Diet Effective Now         03/18/25 0801    03/18/25 0800  Oral Care  2 Times Daily       03/18/25 0346    03/18/25 0730  Insulin Lispro (humaLOG) injection 2-9 Units  4 Times Daily Before Meals & Nightly         03/18/25 0624    03/18/25 0700  POC Glucose 4x Daily Before Meals & at Bedtime  4 Times Daily Before Meals & at Bedtime      Comments: Complete no more than 45 minutes prior to patient eating      03/18/25 0624    03/18/25 0632  PT Consult: Eval & Treat Functional Mobility Below Baseline  Once         03/18/25 0631    03/18/25 0623  dextrose (GLUTOSE) oral gel 15 g  Every 15 Minutes PRN         03/18/25 0623    03/18/25 0623  dextrose (D50W) (25 g/50 mL) IV injection 25 g  Every 15 Minutes PRN         03/18/25 0623    03/18/25 0623  glucagon (GLUCAGEN) injection 1 mg  Every 15 Minutes PRN         03/18/25 0624    03/18/25 0622  ipratropium-albuterol (DUO-NEB) nebulizer solution 3 mL  Every 4 Hours PRN         03/18/25 0623    03/18/25 0622  Wound Ostomy Eval & Treat  Once         03/18/25 0622    03/18/25 0600  Incentive Spirometry  Every 4 Hours While Awake       03/18/25 0346    03/18/25 0504  Inpatient Nutrition Consult  Once        Comments: Patient currently has gtube, need orders  for nutrition   Provider:  (Not yet assigned)    03/18/25 0504    03/18/25 0415  Patient Supplied Pain Pump  Continuous         03/18/25 0346    03/18/25 0415  sodium chloride 0.9 % infusion  Continuous         03/18/25 0346    03/18/25 0400  Vital Signs  Every 4 Hours       03/18/25 0346    03/18/25 0347  Intake & Output  Every Shift       03/18/25 0346    03/18/25 0347  Insert Peripheral IV  Once         03/18/25 0346    03/18/25 0346  ondansetron (ZOFRAN) injection 4 mg  Every 6 Hours PRN         03/18/25 0346    03/18/25 0346  naloxone (NARCAN) injection 0.4 mg  As Needed         03/18/25 0346    03/18/25 0346  sodium chloride 0.9 % flush 10 mL  As Needed         03/18/25 0346    03/18/25 0346  sodium chloride 0.9 % infusion 40 mL  As Needed         03/18/25 0346    03/18/25 0315  Code Status and Medical Interventions: CPR (Attempt to Resuscitate); Full Support  Continuous         03/18/25 0316    03/18/25 0156  Inpatient Hospitalist Consult  Once        Specialty:  Hospitalist  Provider:  Luis Pal MD    03/18/25 0155    03/18/25 0057  Blood Culture - Blood, Arm, Left  Once         03/18/25 0100    03/18/25 0057  Blood Culture - Blood, Arm, Left  Once        Comments: 30 minutes after first collection, or from a different site      03/18/25 0100    02/14/25 0000  Insulin Lispro, 1 Unit Dial, (HUMALOG) 100 UNIT/ML solution pen-injector         03/18/25 0012    01/31/25 0000  Systane 0.4-0.3 % solution ophthalmic solution (artificial tears)         03/18/25 0012    Unscheduled  Follow Hypoglycemia Standing Orders For Blood Glucose <70 & Notify Provider of Treatment  As Needed      Comments: Follow Hypoglycemia Orders As Outlined in Process Instructions (Open Order Report to View Full Instructions)  Notify Provider Any Time Hypoglycemia Treatment is Administered    03/18/25 0623    Unscheduled  Oxygen Therapy- Nasal Cannula; Titrate 1-6 LPM Per SpO2; 90 - 95%  Continuous PRN       03/18/25 0809    --   furosemide (LASIX) 20 MG tablet  Daily         03/18/25 0012                     Corrie Jurado, RN   Registered Nurse  Nursing     Nursing Note     Signed     Date of Service: 03/18/25 0845  Creation Time: 03/18/25 0932     Signed         Pt told tech she wants to leave AMA, because she is not ready to stay in the hospital. Pt alert and oriented x4. Myself and charge RN both educated and encouraged pt on benefits of staying and risks of leaving, pt verbalized understanding, still wishes to leave. AMA form complete per pt. MD notified. Transferring RN on 4mtu also notified of pt no longer coming to unit. Pt left floor via personal wheelchair. Meds and belongings from security and pharmacy given to pt.

## 2025-03-18 NOTE — PLAN OF CARE
Goal Outcome Evaluation:      Patient alert and oriented, c/o pain, MD in to see patien t, will place pain control orders, continent of bowel and bladder, able to get up with standby assist to the bedside commode, requesting to eat, MD ok for patient to have ice chips until nutrition in to see patient. Patient states that she eats and drinks fine at home but that she does cough when drinking.    Draingage noted around GTUbe, thick and yellow. Will continue with plan of care

## 2025-03-18 NOTE — ED PROVIDER NOTES
Time: 9:58 PM EDT  Date of encounter:  3/17/2025  Independent Historian/Clinical History and Information was obtained by:   Patient    History is limited by: N/A    Chief Complaint   Patient presents with    Abdominal Pain         History of Present Illness:  Patient is a 59 y.o. year old female who presents to the emergency department for evaluation of redness and pain around the feeding tube site for the past 3 days.  Patient states that tube has been in place about 3 months.  Patient also complains of sores in her face.  LUZ [place]    Patient also reports bilateral lower extremity pain.  Family states she has been extremely weak and less responsive.  This is usually when she gets like this other his CO2 level is high or she is septic.  Patient not able to give much history at this time.  Patient does have a history of throat cancer.  Per daughter she was in remission but recently they found new cancer that has spread. she has not started chemotherapy yet.    Patient Care Team  Primary Care Provider: Virgil Salas MD    Past Medical History:     Allergies   Allergen Reactions    Amoxicillin Shortness Of Breath     Has tolerated Cefazolin, Ceftriaxone, and Cefepime -Jermaine Melida, [facility]    Ceclor [Cefaclor] Shortness Of Breath     Has tolerated Cefazolin, Ceftriaxone, and Cefepime -Jermaine Melida, [facility]      Penicillins Shortness Of Breath     Has tolerated Cefazolin, Ceftriaxone, and Cefepime -Jermaine Melida, [facility]    Sulfa Antibiotics Rash    Valium [Diazepam] Mental Status Change     DEPRESSED    Ambien [Zolpidem] Mental Status Change    Aspirin GI Intolerance    Ativan [Lorazepam] Mental Status Change    Benadryl [Diphenhydramine] Anxiety     AND MAKES HER HYPER    Biaxin [Clarithromycin] Unknown - Low Severity    Cephalexin Unknown - Low Severity    Clindamycin Unknown - Low Severity    Compazine [Prochlorperazine Edisylate] Rash    Contrast Dye (Echo Or Unknown Ct/Mr) Other (See Comments)     Caused  pain in her arm    Doxycycline Rash    Nsaids GI Intolerance    Phenergan [Promethazine Hcl] GI Intolerance    Promethazine GI Intolerance    Vancomycin Itching     Past Medical History:   Diagnosis Date    Anesthesia     REPORTS HAS GOT REAL EMOTIONAL AND HAS BECOME ANGRY BEFORE    Anxiety     Aortic stenosis, severe     HAS BIO VALVE REPLACED    Arthritis     Asthma     Back pain     Blood clotting disorder     Cancer     Cancer associated pain     HODGKINS LYMPHOMA    CHF (congestive heart failure)     Chronic low back pain with sciatica     Chronic pain disorder     COPD (chronic obstructive pulmonary disease)     Coronary artery disease     Diabetes mellitus     TYPE 2    Diabetes mellitus     Dyspnea on effort     GERD (gastroesophageal reflux disease)     H/O lumpectomy     H/O: hysterectomy     Hiatal hernia     History of degenerative disc disease     History of kidney stones     History of pulmonary embolus (PE)     History of transfusion     AS A CHILD NO TRANSFUSION REACTION    History of transfusion     Hodgkin's lymphoma     5/19/23  5 YEARS SINCE LAST CHEMO TX    Hypertension     Immobility     Liver disease     DENIES ANY CURRENT ISSUES    Lupus     Mitral valve prolapse     Nausea vomiting and diarrhea 3/7/2024    Panic disorder     Pelvic pain     Peripheral neuropathy     Personal history of DVT (deep vein thrombosis)     Presence of intrathecal pump     PTSD (post-traumatic stress disorder)     Sacroiliac joint disease     SI (sacroiliac) joint inflammation     Sleep apnea     Venous insufficiency      Past Surgical History:   Procedure Laterality Date    ABDOMINAL SURGERY      BACK SURGERY      SPINAL FUSION     BACK SURGERY      BLADDER REPAIR      CARDIAC CATHETERIZATION N/A 03/06/2019    Procedure: Valvuloplasty;  Surgeon: Wayne Johnson MD;  Location: [facility] INVASIVE LOCATION;  Service: Cardiology    CARDIAC CATHETERIZATION      CARDIAC CATHETERIZATION Left 5/31/2023     Procedure: Cardiac Catheterization/Vascular Study;  Surgeon: Ponhco Reid MD;  Location: [facility] CATH INVASIVE LOCATION;  Service: Cardiovascular;  Laterality: Left;    CARDIAC SURGERY      CARDIAC VALVE REPLACEMENT  2021    CHOLECYSTECTOMY      COLONOSCOPY N/A 12/29/2021    Procedure: COLONOSCOPY;  Surgeon: Karine Orlando MD;  Location: [facility] ENDOSCOPY;  Service: Gastroenterology;  Laterality: N/A;  POOR PREP    COLONOSCOPY N/A 04/13/2022    Procedure: COLONOSCOPY WITH POLYPECTOMY;  Surgeon: Karine Orlando MD;  Location: [facility] ENDOSCOPY;  Service: Gastroenterology;  Laterality: N/A;  COLON POLYP, HEMORRHOIDS, POOR PREP    COLONOSCOPY      DIRECT LARYNGOSCOPY, ESOPHAGOSCOPY, BRONCHOSCOPY N/A 5/22/2023    Procedure: DIRECT LARYNGOSCOPY WITH BIOPSIES , ESOPHAGOSCOPY, BRONCHOSCOPY;  Surgeon: Ronnie Mcgarry MD;  Location: [facility] OR OSC;  Service: ENT;  Laterality: N/A;    ENDOSCOPY N/A 12/29/2021    Procedure: ESOPHAGOGASTRODUODENOSCOPY;  Surgeon: Karine Orlando MD;  Location: [facility] ENDOSCOPY;  Service: Gastroenterology;  Laterality: N/A;  ESOPHAGITIS, GASTRITIS, HIATAL HERNIA    ENDOSCOPY      ENDOSCOPY N/A 4/16/2024    Procedure: ESOPHAGOGASTRODUODENOSCOPY WITH BIOPSIES;  Surgeon: Karine Orlando MD;  Location: [facility] ENDOSCOPY;  Service: Gastroenterology;  Laterality: N/A;  ESOPHAGITIS, HIATAL HERNIA    ENDOSCOPY W/ PEG TUBE PLACEMENT N/A 12/10/2024    Procedure: ESOPHAGOGASTRODUODENOSCOPY WITH PERCUTANEOUS ENDOSCOPIC GASTROSTOMY TUBE INSERTION WITH ANESTHESIA;  Surgeon: Rodger Ortega MD;  Location: [facility] ENDOSCOPY;  Service: Gastroenterology;  Laterality: N/A;  PEG TUBE INSERTION    HYSTERECTOMY      LYMPHADENECTOMY      PAIN PUMP INSERTION/REVISION N/A 10/18/2019    Procedure: PAIN PUMP INSERTION [facility] 10-18-19 [place];  Surgeon: Horace Rios MD;  Location: [facility];  Service: Pain Management    PAIN PUMP INSERTION/REVISION N/A 11/23/2020     Procedure: pain pump removal;  Surgeon: Horace Rios MD;  Location: [facility] MAIN OR;  Service: Pain Management;  Laterality: N/A;    PEG TUBE INSERTION N/A 7/26/2023    Procedure: PERCUTANEOUS ENDOSCOPIC GASTROSTOMY TUBE INSERTION;  Surgeon: Kody Mercer MD;  Location: [facility] ENDOSCOPY;  Service: General;  Laterality: N/A;  SUCCESSFUL PEG TUBE PLACE PLACEMENT    PORTACATH PLACEMENT      IN LEFT CHEST REPORTS THAT IT IS NOT FUNCTIONING    SKIN BIOPSY      TONSILLECTOMY      TUBAL ABDOMINAL LIGATION      TUMOR REMOVAL       Family History   Problem Relation Age of Onset    Heart failure Mother     Anxiety disorder Mother     Prostate cancer Father     Depression Sister     Bipolar disorder Sister     Pancreatic cancer Sister     ADD / ADHD Brother     Thyroid cancer Maternal Grandmother     Pancreatic cancer Paternal Grandmother     ADD / ADHD Grandson     ADD / ADHD Granddaughter     ADD / ADHD Nephew     Malig Hyperthermia Neg Hx        Home Medications:  Prior to Admission medications    Medication Sig Start Date End Date Taking? Authorizing Provider   acetaminophen (Tylenol) 325 MG tablet Take 1 tablet by mouth Every 6 (Six) Hours As Needed for Mild Pain, Headache or Fever.    ProviderNette MD   albuterol sulfate  (90 Base) MCG/ACT inhaler Inhale 2 puffs Every 4 (Four) Hours As Needed for Wheezing. 11/20/23   Katia Wright MD   ALPRAZolam (XANAX) 0.25 MG tablet Take 2 tablets by mouth 2 (Two) Times a Day As Needed. 11/25/24   Nette Jiménez MD   cetirizine (zyrTEC) 10 MG tablet Take 1 tablet by mouth Daily.  Patient not taking: Reported on 12/23/2024    Nette Jiménez MD   clopidogrel (PLAVIX) 75 MG tablet Take 1 tablet by mouth Daily.  Patient not taking: Reported on 12/23/2024 8/7/24   Katia Wright MD   mupirocin (BACTROBAN) 2 % ointment Apply 1 Application topically to the appropriate area as directed 3 (Three) Times a Day. 4/10/24   Katia Wright MD   naloxone (NARCAN)  "4 MG/0.1ML nasal spray Call 911. Don't prime. [place] in 1 nostril for overdose. Repeat in 2-3 minutes in other nostril if no or minimal breathing/responsiveness. 12/12/24   Ruperto Abdi MD   pain patient supplied pump by Intrathecal route Continuous. Type of Medication: Hydromorphone 15 mg/ml and Bupivacaine 0.5 mg/ml  [employer] [phone]  Provider:Dr. Boudreaux  Provider number: [phone]  Basal Dose: Hydromorphone 7.518 mg/day Bupivacaine 0.63370 mg/day  Pump capacity: 40ml  ( MAX of Hydromorphone 9.456 mg/ day; Bupivacaine 0.03993 mg /day)  Bolus Dose:Hydromorphone 0.500 mg, Bupivacaine 0.29365 mg  Max activations: 4 per day  Lockout 3 hours. 1 Bolus per every 3 hours   Next refill-  01/07/2025  Alarm date- 01/12/2025  Pump manufacture:[employer]    Manny Boudreaux MD   predniSONE (DELTASONE) 20 MG tablet Take 2 tablets by mouth 2 (Two) Times a Day. 12/23/24   Goyo Nunez MD        Social History:   Social History     Tobacco Use    Smoking status: Every Day     Current packs/day: 1.50     Average packs/day: 1.5 packs/day for 46.2 years (69.3 ttl pk-yrs)     Types: Cigarettes     Start date: 1979     Passive exposure: Current    Smokeless tobacco: Never   Vaping Use    Vaping status: Never Used   Substance Use Topics    Alcohol use: Never    Drug use: Never         Review of Systems:  Review of Systems   Unable to perform ROS: Mental status change   Respiratory:  Positive for shortness of breath.    Cardiovascular:  Positive for leg swelling.   Skin:  Positive for color change.   Psychiatric/Behavioral:  Positive for confusion.         Physical Exam:  BP 94/67 (Patient Position: Lying)   Pulse 97   Temp 98.2 °F (36.8 °C) (Oral)   Resp 14   Ht 167.6 cm (65.98\")   Wt 63.8 kg (140 lb 10.5 oz)   LMP  (LMP Unknown)   SpO2 94%   BMI 22.71 kg/m²         Physical Exam  Constitutional:       Appearance: She is ill-appearing.      Comments: Patient is very somnolent and not consistently answering " questions.   HENT:      Head: Normocephalic.      Mouth/Throat:      Mouth: Mucous membranes are moist.   Eyes:      Pupils: Pupils are equal, round, and reactive to light.   Cardiovascular:      Rate and Rhythm: Tachycardia present.   Pulmonary:      Effort: Pulmonary effort is normal.      Breath sounds: Wheezing present.   Abdominal:      General: There is no distension.      Tenderness: There is no abdominal tenderness.      Comments: G-tube with mild surrounding erythema.   Musculoskeletal:      Cervical back: Neck supple.   Skin:     General: Skin is warm and dry.      Comments: Bilateral lower extremity edema, color change bilateral lower extremities.  Multiple superficial wounds to bilateral lower extremities.   Neurological:      General: No focal deficit present.      Mental Status: She is oriented to person, place, and time.   Psychiatric:         Mood and Affect: Mood normal.         Behavior: Behavior normal.                            Medical Decision Making:      Comorbidities that affect care:    Cancer, Congestive Heart Failure, COPD, Diabetes, Hypertension    External Notes reviewed:    Previous Clinic Note: Patient seen by wound care on 2/3/2025 for venous stasis of both lower extremities, open wounds of right lower extremity, open was left lower extremity, squamous cell carcinoma of larynx, tobacco dependence, uncontrolled type 2 diabetes.      The following orders were placed and all results were independently analyzed by me:  Orders Placed This Encounter   Procedures    Blood Culture - Blood,    Blood Culture - Blood,    XR Chest 1 View    Comprehensive Metabolic Panel    [place] Draw    CBC Auto Differential    Urinalysis With Microscopic If Indicated (No Culture) - Urine, Clean Catch    Urinalysis, Microscopic Only - Urine, Clean Catch    Arterial Blood Gas,H&H,Lytes,Lactate    Lactic Acid, Plasma    Lipase    High Sensitivity Troponin T    BNP    Blood Gas, Arterial -    High Sensitivity  Troponin T 1Hr    Inpatient Hospitalist Consult    POC Chem Panel    POC Lactate    Inpatient Admission    CBC & Differential    Green Top (Gel)    Lavender Top    Gold Top - SST    Light Blue Top       Medications Given in the Emergency Department:  Medications   cefepime 2000 mg IVPB in 100 mL NS (VTB) (has no administration in time range)   ipratropium-albuterol (DUO-NEB) nebulizer solution 3 mL (3 mL Nebulization Given 3/18/25 0135)   ipratropium-albuterol (DUO-NEB) nebulizer solution 3 mL (3 mL Nebulization Given 3/18/25 0149)   sodium chloride 0.9 % bolus 1,000 mL (0 mL Intravenous Stopped 3/18/25 0243)        ED Course:    The patient was initially evaluated in the triage area where orders were placed. The patient was later dispositioned by Bethanie Allred MD.      The patient was advised to stay for completion of workup which includes but is not limited to communication of labs and radiological results, reassessment and plan. The patient was advised that leaving prior to disposition by a provider could result in critical findings that are not communicated to the patient.          Labs:    Lab Results (last 24 hours)       Procedure Component Value Units Date/Time    CBC & Differential [[id number]]  (Abnormal) Collected: 03/17/25 2209    Specimen: Blood from Arm, Left Updated: 03/17/25 2218    Narrative:      The following orders were created for panel order CBC & Differential.  Procedure                               Abnormality         Status                     ---------                               -----------         ------                     CBC Auto Differential[[id number]]        Abnormal            Final result                 Please view results for these tests on the individual orders.    Comprehensive Metabolic Panel [[id number]]  (Abnormal) Collected: 03/17/25 2209    Specimen: Blood from Arm, Left Updated: 03/17/25 2303     Glucose 470 mg/dL      BUN 17 mg/dL      Creatinine 0.59 mg/dL       Sodium 133 mmol/L      Potassium 4.9 mmol/L      Chloride 94 mmol/L      CO2 27.9 mmol/L      Calcium 9.2 mg/dL      Total Protein 6.4 g/dL      Albumin 3.8 g/dL      ALT (SGPT) 21 U/L      AST (SGOT) 15 U/L      Alkaline Phosphatase 71 U/L      Total Bilirubin 0.2 mg/dL      Globulin 2.6 gm/dL      A/G Ratio 1.5 g/dL      BUN/Creatinine Ratio 28.8     Anion Gap 11.1 mmol/L      eGFR 104.0 mL/min/1.73     Narrative:      GFR Categories in Chronic Kidney Disease (CKD)      GFR Category          GFR (mL/min/1.73)    Interpretation  G1                     90 or greater         Normal or high (1)  G2                      60-89                Mild decrease (1)  G3a                   45-59                Mild to moderate decrease  G3b                   30-44                Moderate to severe decrease  G4                    15-29                Severe decrease  G5                    14 or less           Kidney failure          (1)In the absence of evidence of kidney disease, neither GFR category G1 or G2 fulfill the criteria for CKD.    eGFR calculation 2021 CKD-EPI creatinine equation, which does not include race as a factor    CBC Auto Differential [[id number]]  (Abnormal) Collected: 03/17/25 2209    Specimen: Blood from Arm, Left Updated: 03/17/25 2218     WBC 11.07 10*3/mm3      RBC 4.87 10*6/mm3      Hemoglobin 12.8 g/dL      Hematocrit 44.0 %      MCV 90.3 fL      MCH 26.3 pg      MCHC 29.1 g/dL      RDW 15.3 %      RDW-SD 50.2 fl      MPV 10.3 fL      Platelets 190 10*3/mm3      Neutrophil % 90.9 %      Lymphocyte % 4.6 %      Monocyte % 3.2 %      Eosinophil % 0.2 %      Basophil % 0.3 %      Immature Grans % 0.8 %      Neutrophils, Absolute 10.07 10*3/mm3      Lymphocytes, Absolute 0.51 10*3/mm3      Monocytes, Absolute 0.35 10*3/mm3      Eosinophils, Absolute 0.02 10*3/mm3      Basophils, Absolute 0.03 10*3/mm3      Immature Grans, Absolute 0.09 10*3/mm3      nRBC 0.0 /100 WBC     Lipase [[id number]]  (Normal)  Collected: 03/17/25 2209    Specimen: Blood from Arm, Left Updated: 03/18/25 0114     Lipase 41 U/L     BNP [[id number]]  (Normal) Collected: 03/17/25 2209    Specimen: Blood from Arm, Left Updated: 03/18/25 0118     proBNP 405.6 pg/mL     Narrative:      This assay is used as an aid in the diagnosis of individuals suspected of having heart failure. It can be used as an aid in the diagnosis of acute decompensated heart failure (ADHF) in patients presenting with signs and symptoms of ADHF to the emergency department (ED). In addition, NT-proBNP of <300 pg/mL indicates ADHF is not likely.    Age Range Result Interpretation  NT-proBNP Concentration (pg/mL:      <50             Positive            >450                   Gray                 300-450                    Negative             <300    50-75           Positive            >900                  Gray                300-900                  Negative            <300      >75             Positive            >1800                  Gray                300-1800                  Negative            <300    Urinalysis With Microscopic If Indicated (No Culture) - [[id number]]  (Abnormal) Collected: 03/17/25 2233    Specimen: Urine Updated: 03/17/25 2321     Color, UA Yellow     Appearance, UA Clear     pH, UA 6.5     Specific Gravity, UA 1.030     Glucose, UA >=1000 mg/dL (3+)     Ketones, UA Negative     Bilirubin, UA Negative     Blood, UA Negative     Protein, UA Negative     Leuk Esterase, UA Small (1+)     Nitrite, UA Negative     Urobilinogen, UA 0.2 E.U./dL    Urinalysis, Microscopic Only - Urine, Clean Catch [[id number]]  (Abnormal) Collected: 03/17/25 2233    Specimen: Urine Updated: 03/17/25 2321     RBC, UA 0-2 /HPF      WBC, UA 11-20 /HPF      Bacteria, UA None Seen /HPF      Squamous Epithelial Cells, UA 0-2 /HPF      Hyaline Casts, UA None Seen /LPF      Methodology Automated Microscopy    Lactic Acid, Plasma [[id number]]  (Normal) Collected: 03/18/25 0117     Specimen: Blood from Arm, Right Updated: 03/18/25 0144     Lactate 1.9 mmol/L     High Sensitivity Troponin T [[id number]]  (Abnormal) Collected: 03/18/25 0117    Specimen: Blood from Arm, Right Updated: 03/18/25 0144     HS Troponin T 18 ng/L     Narrative:      High Sensitive Troponin T Reference Range:  <14.0 ng/L- Negative Female for AMI  <22.0 ng/L- Negative Male for AMI  >=14 - Abnormal Female indicating possible myocardial injury.  >=22 - Abnormal Male indicating possible myocardial injury.   Clinicians would have to utilize clinical acumen, EKG, Troponin, and serial changes to determine if it is an Acute Myocardial Infarction or myocardial injury due to an underlying chronic condition.         POC Chem Panel [[id number]]  (Abnormal) Collected: 03/18/25 0137    Specimen: Arterial Blood Updated: 03/18/25 0140     Glucose 417 mg/dL      Comment: Serial Number: [id number]:  [id number]        Sodium 140 mmol/L      POC Potassium 4.1 mmol/L      Ionized Calcium 1.20 mmol/L      Chloride 98 mmol/L      Creatinine <0.30 mg/dL      BUN 18 mg/dL      CO2 Content >34.54 mmol/L     Blood Gas, Arterial - [[id number]]  (Abnormal) Collected: 03/18/25 0137    Specimen: Arterial Blood Updated: 03/18/25 0140     Site Left Brachial     Shane's Test N/A     pH, Arterial 7.384 pH units      pCO2, Arterial 59.3 mm Hg      pO2, Arterial 66.9 mm Hg      HCO3, Arterial 35.4 mmol/L      Base Excess, Arterial 8.3 mmol/L      Comment: Serial Number: [id number]:  [id number]        O2 Saturation, Arterial 92.0 %      Hemoglobin, Blood Gas 12.9 g/dL      Hematocrit, Blood Gas 38.0 %      Barometric Pressure for Blood Gas 748.0000 mmHg      Modality Room Air     Hemodilution No    POC Lactate [[id number]]  (Normal) Collected: 03/18/25 0137    Specimen: Arterial Blood Updated: 03/18/25 0140     Lactate 1.4 mmol/L      Comment: Serial Number: [id number]:  [id number]                Imaging:    XR Chest 1 View  Result Date: 3/18/2025  AP PORTABLE  CHEST  HISTORY: Cough with difficulty breathing.  COMPARISON: 2/8/2025  TECHNIQUE: AP portable chest x-ray.  FINDINGS: Port-A-Cath tip remains in the SVC. Cardiac and mediastinal contours are normal. Patient is status post TAVR. There are granulomatous calcifications at the right lung base. There is some mild infiltrate or atelectasis at the right base as well. Lungs are otherwise clear. No pneumothorax identified.      Mild infiltrate or atelectasis at the right lung base.  3/18/2025 1:29 AM by Dr. Des Carlson MD on Workstation: [facility]          Differential Diagnosis and Discussion:      Altered Mental Status: Based on the patient's signs and symptoms, differential diagnosis includes but is not limited to meningitis, stroke, sepsis, subarachnoid hemorrhage, intracranial bleeding, encephalitis, and metabolic encephalopathy.    PROCEDURES:    Labs were collected in the emergency department and all labs were reviewed and interpreted by me.  X-ray were performed in the emergency department and all X-ray impressions were independently interpreted by me.  An EKG was performed and the EKG was interpreted by me.    No orders to display        Procedures    MDM  Number of Diagnoses or Management Options  Diagnosis management comments: On arrival patient is somnolent.  She has erythema and venous stasis bilateral lower extremities with multiple wounds.  Concern for possible secondary infection.  Labs showed elevated pCO2 of 59.3 with a pO2 of 66.9.  Blood sugar also elevated 470.  Patient was given IV fluids.  Patient was given dose of antibiotics.  Discussed patient with hospitalist and will admit for further care       Amount and/or Complexity of Data Reviewed  Clinical lab tests: reviewed  Tests in the radiology section of CPT®: reviewed  Review and summarize past medical records: yes  Independent visualization of images, tracings, or specimens: yes    Risk of Complications, Morbidity, and/or Mortality  Presenting  problems: moderate  Management options: moderate                     Patient Care Considerations:    STEROIDS: I considered prescribing steroids, however I did not as this may adversely affect the patient's blood sugar in the setting of diabetes.      Consultants/Shared Management Plan:    Hospitalist: I have discussed the case with Dr Bridges who agrees to accept the patient for admission.    Social Determinants of Health:    Patient has presented with family members who are responsible, reliable and will ensure follow up care.      Disposition and Care Coordination:    Admit:   Through independent evaluation of the patient's history, physical, and imperical data, the patient meets criteria for inpatient admission to the hospital.        Final diagnoses:   Weakness generalized   Cellulitis of lower extremity, unspecified laterality   Acute on chronic respiratory failure with hypoxia and hypercapnia        ED Disposition       ED Disposition   Decision to Admit    Condition   --    Comment   Level of Care: Remote Telemetry [26]  Diagnosis: Generalized weakness [786433]  Admitting Physician: DAYRON BRIDGES [[id number]]  Certification: I Certify That Inpatient Hospital Services Are Medically Necessary For Greater Than 2 Midnights                 This medical record created using voice recognition software.             Bethanie Allred MD  03/18/25 0246

## 2025-03-23 LAB
BACTERIA SPEC AEROBE CULT: NORMAL
BACTERIA SPEC AEROBE CULT: NORMAL

## 2025-03-26 ENCOUNTER — OFFICE VISIT (OUTPATIENT)
Dept: NEUROLOGY | Facility: CLINIC | Age: 60
End: 2025-03-26
Payer: COMMERCIAL

## 2025-03-26 ENCOUNTER — TELEPHONE (OUTPATIENT)
Dept: NEUROLOGY | Facility: CLINIC | Age: 60
End: 2025-03-26

## 2025-03-26 ENCOUNTER — PATIENT ROUNDING (BHMG ONLY) (OUTPATIENT)
Dept: NEUROLOGY | Facility: CLINIC | Age: 60
End: 2025-03-26
Payer: COMMERCIAL

## 2025-03-26 VITALS
HEIGHT: 66 IN | HEART RATE: 80 BPM | SYSTOLIC BLOOD PRESSURE: 108 MMHG | DIASTOLIC BLOOD PRESSURE: 62 MMHG | WEIGHT: 138 LBS | BODY MASS INDEX: 22.18 KG/M2

## 2025-03-26 DIAGNOSIS — R40.0 UNCONTROLLED DAYTIME SOMNOLENCE: Primary | ICD-10-CM

## 2025-03-26 PROCEDURE — 99203 OFFICE O/P NEW LOW 30 MIN: CPT | Performed by: PSYCHIATRY & NEUROLOGY

## 2025-03-26 NOTE — PROGRESS NOTES
"Chief Complaint  Neurologic Problem (Eval for LOC)    Subjective          Isha Llanes is a 59 y.o. female who presents to [facility]  History of Present Illness      History of Present Illness  The patient is a 59-year-old female who presents for evaluation of sleep issues.    She reports experiencing episodes of sudden sleepiness during conversations, which have been occurring for the past 2 to 3 years. These episodes are brief and are often interrupted by physical contact or a sudden noise. She also experiences difficulty maintaining sleep at night, often waking up after 2 to 3 hours without a clear trigger. Despite these sleep disturbances, she does not report excessive daytime sleepiness. The frequency of these episodes varies, occurring between once and twice to five or six times daily. She feels refreshed upon waking in the morning, particularly after smoking a cigarette. She expresses concern about the potential impact of her radiation treatment on her brain function. She resides with her daughter and is capable of walking short distances.    Supplemental Information  She has a history of Hodgkin's lymphoma and throat cancer, for which she underwent radiation therapy. Currently, she has a lesion on the side of her face that requires a biopsy. She has lymphedema in her neck and has been using a walker for about 2 years due to lower back surgery.    SOCIAL HISTORY  She smokes cigarettes.      Objective   Vital Signs:   /62 (BP Location: Left arm, Patient Position: Sitting, Cuff Size: Adult)   Pulse 80   Ht 167.6 cm (65.98\")   Wt 62.6 kg (138 lb)   BMI 22.28 kg/m²     Physical Exam   Alert, fluent, phasic, follows commands well.  She feels full, EMs full directions gaze, facial strength is full.  There is no weakness of the upper or lower extremities grossly.  She is in a wheelchair.     Results           Assessment and Plan  Diagnoses and all orders " for this visit:    1. Uncontrolled daytime somnolence (Primary)  -     Ambulatory Referral to Sleep Medicine         Assessment & Plan  1. Sleep disturbances.  Her symptoms do not align with a diagnosis of seizure, stroke, anxiety or functional disorder.  The most plausible explanation is a sleep-related disorder. A referral to a sleep specialist will be initiated for further evaluation and management.    PROCEDURE  The patient underwent lower back surgery in the past.      Total time spent with the patient and coordinating patient care was 30 minutes.    Follow Up  No follow-ups on file.  Patient was given instructions and counseling regarding her condition or for health maintenance advice. Please see specific information pulled into the AVS if appropriate.     Patient or patient representative verbalized consent for the use of Ambient Listening during the visit with  Manuelito Lopez MD for chart documentation. 3/26/2025  11:26 EDT

## 2025-03-26 NOTE — TELEPHONE ENCOUNTER
Provider: DR PARKER    Caller: Sheila Watson    Relationship to Patient: Emergency Contact    Phone Number: [phone]    Reason for Call: WOULD LIKE TO DISCUSS OUTCOME OF TODAY'S VISIT. CONCERNED WITH JUST GETTING A SLEEP STUDY DONE FOR PATIENT WITH NO OTHER LAB TESTING OR IMAGING AS SHE HAS A LOT OF OTHER ISSUES GOING ON THAN JUST HER SLEEP. PLEASE REVIEW & ADVISE, THANK YOU.

## 2025-03-27 ENCOUNTER — TELEPHONE (OUTPATIENT)
Dept: GASTROENTEROLOGY | Facility: CLINIC | Age: 60
End: 2025-03-27
Payer: COMMERCIAL

## 2025-03-27 ENCOUNTER — OFFICE VISIT (OUTPATIENT)
Dept: GASTROENTEROLOGY | Facility: CLINIC | Age: 60
End: 2025-03-27
Payer: COMMERCIAL

## 2025-03-27 VITALS — HEART RATE: 113 BPM | DIASTOLIC BLOOD PRESSURE: 68 MMHG | SYSTOLIC BLOOD PRESSURE: 110 MMHG

## 2025-03-27 DIAGNOSIS — R19.7 DIARRHEA, UNSPECIFIED TYPE: Primary | ICD-10-CM

## 2025-03-27 DIAGNOSIS — R11.0 NAUSEA: ICD-10-CM

## 2025-03-27 DIAGNOSIS — R12 HEARTBURN: ICD-10-CM

## 2025-03-27 RX ORDER — PAROXETINE 20 MG/1
20 TABLET, FILM COATED ORAL EVERY MORNING
COMMUNITY
Start: 2025-03-27

## 2025-03-27 RX ORDER — LOPERAMIDE HYDROCHLORIDE 2 MG/1
2 CAPSULE ORAL 4 TIMES DAILY PRN
Qty: 90 CAPSULE | Refills: 1 | Status: SHIPPED | OUTPATIENT
Start: 2025-03-27

## 2025-03-27 RX ORDER — FAMOTIDINE 40 MG/1
40 TABLET, FILM COATED ORAL
Qty: 90 TABLET | Refills: 1 | Status: SHIPPED | OUTPATIENT
Start: 2025-03-27

## 2025-03-27 RX ORDER — ONDANSETRON 8 MG/1
8 TABLET, ORALLY DISINTEGRATING ORAL DAILY PRN
COMMUNITY
Start: 2025-03-27

## 2025-03-27 NOTE — TELEPHONE ENCOUNTER
When checking patient out it was notated that the patient should follow up in 6months, offered pt appt in October of 2025 pt declined and states she will call back to schedule.

## 2025-03-27 NOTE — TELEPHONE ENCOUNTER
"Spoke to Sheila and advised that pt has had multiple labs and Ct's and PET scans. She wanted know why an MRI or EEG was not ordered. I did explain after  assessed and spoke with pt that he did not believe her issues were Neurological and maybe sleep related which is why he ordered a sleep study. She wants to make sure that there is no \"brain damage\" due to everything the pt has been through. 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 She is requesting MRI.  "

## 2025-03-27 NOTE — PROGRESS NOTES
Chief Complaint     Abdominal Pain, Nausea, and Vomiting    Patient or patient representative verbalized consent for the use of Ambient Listening during the visit with  LUZ Rene for chart documentation. 3/27/2025  13:34 EDT      History of Present Illness       History of Present Illness  The patient presents for follow-up of weight loss, epigastric pain, nausea, vomiting, anorexia, and a history of laryngeal cancer.    She has been experiencing frequent hospitalizations and continues to utilize a feeding tube. Her oral intake is limited, necessitating the use of the feeding tube. She reports persistent nausea and heartburn.     She is scheduled for a biopsy on 04/07/2025 to further investigate her cancer. She has undergone a PET scan.    She reports frequent diarrhea, which she manages with Imodium. The onset of this symptom was sudden. She reports no presence of blood in her stool or lower abdominal pain. She has not had any stool studies done. She has noticed a slight improvement in her diarrhea since starting steroids. She does not have bladder incontinence but reports bowel incontinence. She has to wear Depends due to fear of not making it to the bathroom in time.    She is currently on a daily steroid regimen, with a dosage reduction from 80 mg to 70 mg, to manage generalized body aches from lupus.    Supplemental Information  She was admitted to [facility] from 02/08/2025 through 02/12/2025 for a COPD exacerbation.         History      Past Medical History:   Diagnosis Date    Anesthesia     REPORTS HAS GOT REAL EMOTIONAL AND HAS BECOME ANGRY BEFORE    Anxiety     Aortic stenosis, severe     HAS BIO VALVE REPLACED    Arthritis     Asthma     Back pain     Blood clotting disorder     Cancer     Cancer associated pain     HODGKINS LYMPHOMA    CHF (congestive heart failure)     Chronic low back pain with sciatica     Chronic pain disorder     COPD (chronic obstructive pulmonary disease)      Coronary artery disease     Diabetes mellitus     TYPE 2    Diabetes mellitus     Dyspnea on effort     GERD (gastroesophageal reflux disease)     H/O lumpectomy     H/O: hysterectomy     Hiatal hernia     History of degenerative disc disease     History of kidney stones     History of pulmonary embolus (PE)     History of transfusion     AS A CHILD NO TRANSFUSION REACTION    History of transfusion     Hodgkin's lymphoma     5/19/23  5 YEARS SINCE LAST CHEMO TX    Hypertension     Immobility     Liver disease     DENIES ANY CURRENT ISSUES    Lupus     Mitral valve prolapse     Nausea vomiting and diarrhea 3/7/2024    Panic disorder     Pelvic pain     Peripheral neuropathy     Personal history of DVT (deep vein thrombosis)     Presence of intrathecal pump     PTSD (post-traumatic stress disorder)     Sacroiliac joint disease     SI (sacroiliac) joint inflammation     Sleep apnea     Venous insufficiency      Past Surgical History:   Procedure Laterality Date    ABDOMINAL SURGERY      BACK SURGERY      SPINAL FUSION     BACK SURGERY      BLADDER REPAIR      CARDIAC CATHETERIZATION N/A 03/06/2019    Procedure: Valvuloplasty;  Surgeon: Wayne Johnson MD;  Location: [facility] CATH INVASIVE LOCATION;  Service: Cardiology    CARDIAC CATHETERIZATION      CARDIAC CATHETERIZATION Left 5/31/2023    Procedure: Cardiac Catheterization/Vascular Study;  Surgeon: Poncho Reid MD;  Location: [facility] CATH INVASIVE LOCATION;  Service: Cardiovascular;  Laterality: Left;    CARDIAC SURGERY      CARDIAC VALVE REPLACEMENT  2021    CHOLECYSTECTOMY      COLONOSCOPY N/A 12/29/2021    Procedure: COLONOSCOPY;  Surgeon: Karine Orlando MD;  Location: [facility] ENDOSCOPY;  Service: Gastroenterology;  Laterality: N/A;  POOR PREP    COLONOSCOPY N/A 04/13/2022    Procedure: COLONOSCOPY WITH POLYPECTOMY;  Surgeon: Karine Orlando MD;  Location: [facility] ENDOSCOPY;  Service: Gastroenterology;  Laterality: N/A;  COLON POLYP,  HEMORRHOIDS, POOR PREP    COLONOSCOPY      DIRECT LARYNGOSCOPY, ESOPHAGOSCOPY, BRONCHOSCOPY N/A 5/22/2023    Procedure: DIRECT LARYNGOSCOPY WITH BIOPSIES , ESOPHAGOSCOPY, BRONCHOSCOPY;  Surgeon: Ronnie Mcgarry MD;  Location: [facility] OR [facility];  Service: ENT;  Laterality: N/A;    ENDOSCOPY N/A 12/29/2021    Procedure: ESOPHAGOGASTRODUODENOSCOPY;  Surgeon: Karine Orlando MD;  Location: [facility] ENDOSCOPY;  Service: Gastroenterology;  Laterality: N/A;  ESOPHAGITIS, GASTRITIS, HIATAL HERNIA    ENDOSCOPY      ENDOSCOPY N/A 4/16/2024    Procedure: ESOPHAGOGASTRODUODENOSCOPY WITH BIOPSIES;  Surgeon: Karine Orlando MD;  Location: [facility] ENDOSCOPY;  Service: Gastroenterology;  Laterality: N/A;  ESOPHAGITIS, HIATAL HERNIA    ENDOSCOPY W/ PEG TUBE PLACEMENT N/A 12/10/2024    Procedure: ESOPHAGOGASTRODUODENOSCOPY WITH PERCUTANEOUS ENDOSCOPIC GASTROSTOMY TUBE INSERTION WITH ANESTHESIA;  Surgeon: Rodger Ortega MD;  Location: [facility] ENDOSCOPY;  Service: Gastroenterology;  Laterality: N/A;  PEG TUBE INSERTION    HYSTERECTOMY      LYMPHADENECTOMY      PAIN PUMP INSERTION/REVISION N/A 10/18/2019    Procedure: PAIN PUMP INSERTION [facility] [place] 10-18-[facility] [place];  Surgeon: Horace Rios MD;  Location: [facility];  Service: Pain Management    PAIN PUMP INSERTION/REVISION N/A 11/23/2020    Procedure: pain pump removal;  Surgeon: Horace Rios MD;  Location: [facility];  Service: Pain Management;  Laterality: N/A;    PEG TUBE INSERTION N/A 7/26/2023    Procedure: PERCUTANEOUS ENDOSCOPIC GASTROSTOMY TUBE INSERTION;  Surgeon: Kody Mercer MD;  Location: [facility] ENDOSCOPY;  Service: General;  Laterality: N/A;  SUCCESSFUL PEG TUBE PLACE PLACEMENT    PORTACATH PLACEMENT      IN LEFT CHEST REPORTS THAT IT IS NOT FUNCTIONING    SKIN BIOPSY      TONSILLECTOMY      TUBAL ABDOMINAL LIGATION      TUMOR REMOVAL       Family History   Problem Relation Age of Onset    Heart failure Mother     Anxiety disorder  Mother     Prostate cancer Father     Depression Sister     Bipolar disorder Sister     Pancreatic cancer Sister     ADD / ADHD Brother     Thyroid cancer Maternal Grandmother     Pancreatic cancer Paternal Grandmother     ADD / ADHD Grandson     ADD / ADHD Granddaughter     ADD / ADHD Nephew     Malig Hyperthermia Neg Hx         Current Medications       Current Outpatient Medications:     acetaminophen (Tylenol) 325 MG tablet, Take 1 tablet by mouth Every 6 (Six) Hours As Needed for Mild Pain, Headache or Fever., Disp: , Rfl:     albuterol sulfate  (90 Base) MCG/ACT inhaler, Inhale 2 puffs Every 4 (Four) Hours As Needed for Wheezing., Disp: 18 g, Rfl: 2    ALPRAZolam (XANAX) 0.25 MG tablet, Take 2 tablets by mouth 2 (Two) Times a Day As Needed., Disp: , Rfl:     Insulin Lispro, 1 Unit Dial, (HUMALOG) 100 UNIT/ML solution pen-injector, Inject 12 units daily, Disp: , Rfl:     mupirocin (BACTROBAN) 2 % ointment, Apply 1 Application topically to the appropriate area as directed 3 (Three) Times a Day., Disp: 30 g, Rfl: 2    naloxone (NARCAN) 4 MG/0.1ML nasal spray, Call 911. Don't prime. [place] in 1 nostril for overdose. Repeat in 2-3 minutes in other nostril if no or minimal breathing/responsiveness., Disp: 2 each, Rfl: 0    ondansetron ODT (ZOFRAN-ODT) 8 MG disintegrating tablet, , Disp: , Rfl:     pain patient supplied pump, by Intrathecal route Continuous. Type of Medication: Hydromorphone 15 mg/ml and Bupivacaine 0.5 mg/ml Pentech [phone] Provider:Dr. Boudreaux Provider number: [phone] Basal Dose: Hydromorphone 7.518 mg/day Bupivacaine 0.57268 mg/day Pump capacity: 40ml ( MAX of Hydromorphone 9.456 mg/ day; Bupivacaine 0.66862 mg /day) Bolus Dose:Hydromorphone 0.500 mg, Bupivacaine 0.38923 mg Max activations: 4 per day Lockout 3 hours. 1 Bolus per every 3 hours  Next refill-  01/07/2025 Alarm date- 01/12/2025 Pump manufacture:Medtronic, Disp: , Rfl:     PARoxetine (PAXIL) 20 MG tablet, , Disp: , Rfl:      predniSONE (DELTASONE) 20 MG tablet, Take 2 tablets by mouth 2 (Two) Times a Day., Disp: 120 tablet, Rfl: 1    Systane 0.4-0.3 % solution ophthalmic solution (artificial tears), Instill 1 TO 2 drops IN each eye 3 TO 4 times daily for dry eyes, Disp: , Rfl:     famotidine (Pepcid) 40 MG tablet, Take 1 tablet by mouth every night at bedtime., Disp: 90 tablet, Rfl: 1    loperamide (IMODIUM) 2 MG capsule, Take 1 capsule by mouth 4 (Four) Times a Day As Needed for Diarrhea (2 with 1st episode of diarrhea and 1 with each additonal episode, max of 8 per day.)., Disp: 90 capsule, Rfl: 1     Allergies     Allergies   Allergen Reactions    Amoxicillin Shortness Of Breath     Has tolerated Cefazolin, Ceftriaxone, and Cefepime -Jermaine Melida, RPH    Ceclor [Cefaclor] Shortness Of Breath     Has tolerated Cefazolin, Ceftriaxone, and Cefepime -Jermaine Melida, RP      Penicillins Shortness Of Breath     Has tolerated Cefazolin, Ceftriaxone, and Cefepime -Jermaine Melida, [facility]    Sulfa Antibiotics Rash    Valium [Diazepam] Mental Status Change     DEPRESSED    Ambien [Zolpidem] Mental Status Change    Aspirin GI Intolerance    Ativan [Lorazepam] Mental Status Change    Benadryl [Diphenhydramine] Anxiety     AND MAKES HER HYPER    Biaxin [Clarithromycin] Unknown - Low Severity    Cephalexin Unknown - Low Severity    Clindamycin Unknown - Low Severity    Compazine [Prochlorperazine Edisylate] Rash    Contrast Dye (Echo Or Unknown Ct/Mr) Other (See Comments)     Caused pain in her arm    Doxycycline Rash    Nsaids GI Intolerance    Phenergan [Promethazine Hcl] GI Intolerance    Promethazine GI Intolerance    Vancomycin Itching       Social History       Social History     Social History Narrative    ** Merged History Encounter **              Objective       /68 (BP Location: Left arm, Patient Position: Sitting, Cuff Size: Adult)   Pulse 113       Physical Exam  Constitutional:       General: She is not in acute  distress.     Appearance: Normal appearance. She is well-developed and normal weight.   HENT:      Head: Normocephalic and atraumatic.   Eyes:      Conjunctiva/sclera: Conjunctivae normal.      Pupils: Pupils are equal, round, and reactive to light.      Visual Fields: Right eye visual fields normal and left eye visual fields normal.   Cardiovascular:      Rate and Rhythm: Normal rate.   Pulmonary:      Effort: Pulmonary effort is normal. No respiratory distress or retractions.      Breath sounds: Normal air entry.   Abdominal:      General: There is no distension.      Tenderness: There is no abdominal tenderness.   Musculoskeletal:         General: Normal range of motion.      Right lower leg: No edema.      Left lower leg: No edema.   Skin:     General: Skin is warm and dry.      Findings: No lesion.   Neurological:      General: No focal deficit present.      Mental Status: She is alert and oriented to person, place, and time.   Psychiatric:         Mood and Affect: Mood and affect normal.         Behavior: Behavior normal.         Results       Result Review :    The following data was reviewed by: LUZ Rene on 03/27/2025:    CBC w/diff          2/8/2025    06:59 3/17/2025    22:09 3/18/2025    05:33   CBC w/Diff   WBC 4.66  11.07  9.02    RBC 4.50  4.87  4.50    Hemoglobin 12.4  12.8  12.2    Hematocrit 40.3  44.0  41.1    MCV 89.6  90.3  91.3    MCH 27.6  26.3  27.1    MCHC 30.8  29.1  29.7    RDW 15.3  15.3  15.0    Platelets 121  190  179    Neutrophil Rel % 85.6  90.9     Immature Granulocyte Rel % 1.1  0.8     Lymphocyte Rel % 7.1  4.6     Monocyte Rel % 6.0  3.2     Eosinophil Rel % 0.0  0.2     Basophil Rel % 0.2  0.3       CMP          2/8/2025    06:59 3/17/2025    22:09 3/18/2025    01:37 3/18/2025    05:33   CMP   Glucose 214  470   259    BUN 15  17   14    Creatinine 0.49  0.59  <0.30  0.38    EGFR 108.7  104.0   115.6    Sodium 137  133   137    Potassium 3.5  4.9   4.4     Chloride 97  94   101    Calcium 8.9  9.2   8.4    Total Protein 6.3  6.4      Albumin 3.6  3.8      Globulin 2.7  2.6      Total Bilirubin 0.2  0.2      Alkaline Phosphatase 59  71      AST (SGOT) 17  15      ALT (SGPT) 21  21      Albumin/Globulin Ratio 1.3  1.5      BUN/Creatinine Ratio 30.6  28.8   36.8    Anion Gap 6.3  11.1   7.5            Results  Imaging  EGD performed on 12/10/2024 showed normal esophagus, stomach, and duodenum. EGD performed on 04/16/2024 showed nonsevere esophagitis at the GE junction, small hiatal hernia, evidence of a closed previous gastrotomy tube present in the gastric body, and normal stomach. Gastric antrum biopsy showed mild reactive gastropathy, negative for H. pylori. First and second portion of the duodenum were normal. Biopsy normal.           Assessment and Plan              Diagnoses and all orders for this visit:    1. Diarrhea, unspecified type (Primary)  -     Clostridioides difficile Toxin - Stool, Per Rectum; Future  -     Enteric Bacterial Panel - Stool, Per Rectum; Future  -     Enteric Parasite Panel - Stool, Per Rectum; Future  -     Fecal Lactoferrin Qual. - Stool, Per Rectum; Future  -     Occult Blood, Fecal By Immunoassay - Stool, Per Rectum; Future  -     loperamide (IMODIUM) 2 MG capsule; Take 1 capsule by mouth 4 (Four) Times a Day As Needed for Diarrhea (2 with 1st episode of diarrhea and 1 with each additonal episode, max of 8 per day.).  Dispense: 90 capsule; Refill: 1    2. Nausea    3. Heartburn  -     famotidine (Pepcid) 40 MG tablet; Take 1 tablet by mouth every night at bedtime.  Dispense: 90 tablet; Refill: 1        Assessment & Plan  1. Nausea and heartburn.  She reports experiencing nausea and heartburn. Pepcid will be prescribed to be taken at night to manage these symptoms.    2. Diarrhea.  She reports frequent diarrhea and is currently using Imodium for management. Stool studies have been ordered to investigate the cause of the diarrhea. She  is advised to continue taking Imodium. If the stool studies do not reveal any abnormalities, a colonoscopy may be considered.    3. History of laryngeal cancer.  She has a history of laryngeal cancer and is scheduled for a biopsy on 04/07/2025 to further investigate left lower face subcutaneous lesion.       PROCEDURE  EGD performed on 12/10/2024 showed normal esophagus, stomach, and duodenum. PEG tube placed. EGD performed on 04/16/2024 showed nonsevere esophagitis at the GE junction, small hiatal hernia, evidence of a closed previous gastrotomy tube present in the gastric body, and normal stomach. Gastric antrum biopsy showed mild reactive gastropathy, negative for H. pylori. First and second portion of the duodenum were normal. Biopsy normal.            Follow Up     Follow Up   Return in about 6 months (around 9/27/2025) for Diarrhea and nausea .  Patient was given instructions and counseling regarding her condition or for health maintenance advice. Please see specific information pulled into the AVS if appropriate.

## 2025-03-28 ENCOUNTER — APPOINTMENT (OUTPATIENT)
Dept: GENERAL RADIOLOGY | Facility: HOSPITAL | Age: 60
DRG: 189 | End: 2025-03-28
Payer: COMMERCIAL

## 2025-03-28 ENCOUNTER — HOSPITAL ENCOUNTER (INPATIENT)
Facility: HOSPITAL | Age: 60
LOS: 2 days | Discharge: LEFT BEFORE DISCHARGE | DRG: 189 | End: 2025-03-30
Attending: EMERGENCY MEDICINE | Admitting: FAMILY MEDICINE
Payer: COMMERCIAL

## 2025-03-28 DIAGNOSIS — J96.02 ACUTE RESPIRATORY FAILURE WITH HYPOXIA AND HYPERCAPNIA: Primary | ICD-10-CM

## 2025-03-28 DIAGNOSIS — J96.01 ACUTE RESPIRATORY FAILURE WITH HYPOXIA AND HYPERCAPNIA: Primary | ICD-10-CM

## 2025-03-28 DIAGNOSIS — J44.1 ACUTE EXACERBATION OF CHRONIC OBSTRUCTIVE PULMONARY DISEASE (COPD): ICD-10-CM

## 2025-03-28 DIAGNOSIS — Z86.39 HISTORY OF PINEAL CYST: Primary | ICD-10-CM

## 2025-03-28 PROBLEM — J96.21 ACUTE ON CHRONIC RESPIRATORY FAILURE WITH HYPOXIA: Status: ACTIVE | Noted: 2025-03-28

## 2025-03-28 PROBLEM — J96.22 ACUTE ON CHRONIC RESPIRATORY FAILURE WITH HYPERCAPNIA: Status: ACTIVE | Noted: 2025-03-28

## 2025-03-28 PROBLEM — R11.2 INTRACTABLE NAUSEA AND VOMITING: Status: ACTIVE | Noted: 2025-03-28

## 2025-03-28 LAB
ALBUMIN SERPL-MCNC: 3.7 G/DL (ref 3.5–5.2)
ALBUMIN/GLOB SERPL: 1.2 G/DL
ALP SERPL-CCNC: 73 U/L (ref 39–117)
ALT SERPL W P-5'-P-CCNC: 26 U/L (ref 1–33)
ANION GAP SERPL CALCULATED.3IONS-SCNC: 8.9 MMOL/L (ref 5–15)
ARTERIAL PATENCY WRIST A: ABNORMAL
ARTERIAL PATENCY WRIST A: POSITIVE
AST SERPL-CCNC: 33 U/L (ref 1–32)
ATMOSPHERIC PRESS: 744.3 MMHG
ATMOSPHERIC PRESS: 748.4 MMHG
BASE EXCESS BLDA CALC-SCNC: 10.3 MMOL/L (ref -2–2)
BASE EXCESS BLDA CALC-SCNC: 5.5 MMOL/L (ref -2–2)
BASOPHILS # BLD AUTO: 0.04 10*3/MM3 (ref 0–0.2)
BASOPHILS NFR BLD AUTO: 0.3 % (ref 0–1.5)
BDY SITE: ABNORMAL
BDY SITE: ABNORMAL
BILIRUB SERPL-MCNC: 0.3 MG/DL (ref 0–1.2)
BUN SERPL-MCNC: 18 MG/DL (ref 6–20)
BUN/CREAT SERPL: 34 (ref 7–25)
CA-I BLDA-SCNC: 1.23 MMOL/L (ref 1.13–1.32)
CA-I BLDA-SCNC: 1.29 MMOL/L (ref 1.13–1.32)
CALCIUM SPEC-SCNC: 8.5 MG/DL (ref 8.6–10.5)
CHLORIDE BLDA-SCNC: 94 MMOL/L (ref 98–107)
CHLORIDE BLDA-SCNC: 95 MMOL/L (ref 98–107)
CHLORIDE SERPL-SCNC: 94 MMOL/L (ref 98–107)
CO2 SERPL-SCNC: 30.1 MMOL/L (ref 22–29)
CREAT SERPL-MCNC: 0.53 MG/DL (ref 0.57–1)
D-LACTATE SERPL-SCNC: 0.7 MMOL/L
D-LACTATE SERPL-SCNC: 0.8 MMOL/L
DEPRECATED RDW RBC AUTO: 49.1 FL (ref 37–54)
EGFRCR SERPLBLD CKD-EPI 2021: 106.7 ML/MIN/1.73
EOSINOPHIL # BLD AUTO: 0.05 10*3/MM3 (ref 0–0.4)
EOSINOPHIL NFR BLD AUTO: 0.4 % (ref 0.3–6.2)
ERYTHROCYTE [DISTWIDTH] IN BLOOD BY AUTOMATED COUNT: 14.8 % (ref 12.3–15.4)
FLUAV RNA RESP QL NAA+PROBE: NOT DETECTED
FLUBV RNA RESP QL NAA+PROBE: NOT DETECTED
GAS FLOW AIRWAY: 2 LPM
GAS FLOW AIRWAY: 2 LPM
GEN 5 1HR TROPONIN T REFLEX: 25 NG/L
GLOBULIN UR ELPH-MCNC: 3 GM/DL
GLUCOSE BLDC GLUCOMTR-MCNC: 137 MG/DL (ref 70–99)
GLUCOSE BLDC GLUCOMTR-MCNC: 151 MG/DL (ref 65–99)
GLUCOSE BLDC GLUCOMTR-MCNC: 284 MG/DL (ref 70–99)
GLUCOSE BLDC GLUCOMTR-MCNC: 409 MG/DL (ref 65–99)
GLUCOSE SERPL-MCNC: 361 MG/DL (ref 65–99)
HCO3 BLDA-SCNC: 34.4 MMOL/L (ref 22–26)
HCO3 BLDA-SCNC: 38.7 MMOL/L (ref 22–26)
HCT VFR BLD AUTO: 47.2 % (ref 34–46.6)
HCT VFR BLD CALC: 42 % (ref 38–51)
HCT VFR BLD CALC: 44 % (ref 38–51)
HEMODILUTION: NO
HEMODILUTION: NO
HGB BLD-MCNC: 14.2 G/DL (ref 12–15.9)
HGB BLDA-MCNC: 14.4 G/DL (ref 12–18)
HGB BLDA-MCNC: 15.1 G/DL (ref 12–18)
HOLD SPECIMEN: NORMAL
HOLD SPECIMEN: NORMAL
IMM GRANULOCYTES # BLD AUTO: 0.14 10*3/MM3 (ref 0–0.05)
IMM GRANULOCYTES NFR BLD AUTO: 1.2 % (ref 0–0.5)
INHALED O2 CONCENTRATION: 28 %
LYMPHOCYTES # BLD AUTO: 0.62 10*3/MM3 (ref 0.7–3.1)
LYMPHOCYTES NFR BLD AUTO: 5.2 % (ref 19.6–45.3)
Lab: ABNORMAL
Lab: ABNORMAL
MCH RBC QN AUTO: 27 PG (ref 26.6–33)
MCHC RBC AUTO-ENTMCNC: 30.1 G/DL (ref 31.5–35.7)
MCV RBC AUTO: 89.7 FL (ref 79–97)
MODALITY: ABNORMAL
MODALITY: ABNORMAL
MONOCYTES # BLD AUTO: 0.57 10*3/MM3 (ref 0.1–0.9)
MONOCYTES NFR BLD AUTO: 4.7 % (ref 5–12)
NEUTROPHILS NFR BLD AUTO: 10.59 10*3/MM3 (ref 1.7–7)
NEUTROPHILS NFR BLD AUTO: 88.2 % (ref 42.7–76)
NOTIFIED WHO: ABNORMAL
NOTIFIED WHO: ABNORMAL
NRBC BLD AUTO-RTO: 0 /100 WBC (ref 0–0.2)
NT-PROBNP SERPL-MCNC: 655.4 PG/ML (ref 0–900)
PCO2 BLDA: 67.8 MM HG (ref 35–45)
PCO2 BLDA: 68.5 MM HG (ref 35–45)
PH BLDA: 7.31 PH UNITS (ref 7.35–7.45)
PH BLDA: 7.36 PH UNITS (ref 7.35–7.45)
PLATELET # BLD AUTO: 165 10*3/MM3 (ref 140–450)
PMV BLD AUTO: 10.6 FL (ref 6–12)
PO2 BLD: 211 MM[HG] (ref 0–500)
PO2 BLDA: 59.2 MM HG (ref 80–100)
PO2 BLDA: 87.9 MM HG (ref 80–100)
POTASSIUM BLDA-SCNC: 4 MMOL/L (ref 3.5–5)
POTASSIUM BLDA-SCNC: 4.2 MMOL/L (ref 3.5–5)
POTASSIUM SERPL-SCNC: 4.7 MMOL/L (ref 3.5–5.2)
PROT SERPL-MCNC: 6.7 G/DL (ref 6–8.5)
QT INTERVAL: 333 MS
QTC INTERVAL: 467 MS
RBC # BLD AUTO: 5.26 10*6/MM3 (ref 3.77–5.28)
READ BACK: YES
READ BACK: YES
RSV RNA RESP QL NAA+PROBE: NOT DETECTED
SAO2 % BLDCOA: 88 % (ref 95–99)
SAO2 % BLDCOA: 95.3 % (ref 95–99)
SARS-COV-2 RNA RESP QL NAA+PROBE: NOT DETECTED
SODIUM BLD-SCNC: 138 MMOL/L (ref 131–143)
SODIUM BLD-SCNC: 142 MMOL/L (ref 131–143)
SODIUM SERPL-SCNC: 133 MMOL/L (ref 136–145)
TROPONIN T % DELTA: -26
TROPONIN T NUMERIC DELTA: -9 NG/L
TROPONIN T SERPL HS-MCNC: 34 NG/L
WBC NRBC COR # BLD AUTO: 12.01 10*3/MM3 (ref 3.4–10.8)
WHOLE BLOOD HOLD COAG: NORMAL
WHOLE BLOOD HOLD SPECIMEN: NORMAL

## 2025-03-28 PROCEDURE — 25810000003 SODIUM CHLORIDE 0.9 % SOLUTION 250 ML FLEX CONT: Performed by: EMERGENCY MEDICINE

## 2025-03-28 PROCEDURE — 84484 ASSAY OF TROPONIN QUANT: CPT | Performed by: EMERGENCY MEDICINE

## 2025-03-28 PROCEDURE — 25810000003 SODIUM CHLORIDE 0.9 % SOLUTION: Performed by: FAMILY MEDICINE

## 2025-03-28 PROCEDURE — 82948 REAGENT STRIP/BLOOD GLUCOSE: CPT

## 2025-03-28 PROCEDURE — 94799 UNLISTED PULMONARY SVC/PX: CPT

## 2025-03-28 PROCEDURE — 25010000002 HYDROMORPHONE 1 MG/ML SOLUTION: Performed by: FAMILY MEDICINE

## 2025-03-28 PROCEDURE — 36415 COLL VENOUS BLD VENIPUNCTURE: CPT | Performed by: EMERGENCY MEDICINE

## 2025-03-28 PROCEDURE — 87637 SARSCOV2&INF A&B&RSV AMP PRB: CPT | Performed by: EMERGENCY MEDICINE

## 2025-03-28 PROCEDURE — 25010000002 AZITHROMYCIN PER 500 MG: Performed by: EMERGENCY MEDICINE

## 2025-03-28 PROCEDURE — 25010000002 ONDANSETRON PER 1 MG: Performed by: FAMILY MEDICINE

## 2025-03-28 PROCEDURE — 94660 CPAP INITIATION&MGMT: CPT

## 2025-03-28 PROCEDURE — 93005 ELECTROCARDIOGRAM TRACING: CPT | Performed by: EMERGENCY MEDICINE

## 2025-03-28 PROCEDURE — 93005 ELECTROCARDIOGRAM TRACING: CPT

## 2025-03-28 PROCEDURE — 80051 ELECTROLYTE PANEL: CPT

## 2025-03-28 PROCEDURE — 85025 COMPLETE CBC W/AUTO DIFF WBC: CPT

## 2025-03-28 PROCEDURE — 82330 ASSAY OF CALCIUM: CPT

## 2025-03-28 PROCEDURE — 83880 ASSAY OF NATRIURETIC PEPTIDE: CPT | Performed by: EMERGENCY MEDICINE

## 2025-03-28 PROCEDURE — 36600 WITHDRAWAL OF ARTERIAL BLOOD: CPT

## 2025-03-28 PROCEDURE — 99291 CRITICAL CARE FIRST HOUR: CPT

## 2025-03-28 PROCEDURE — 82803 BLOOD GASES ANY COMBINATION: CPT

## 2025-03-28 PROCEDURE — 80053 COMPREHEN METABOLIC PANEL: CPT | Performed by: EMERGENCY MEDICINE

## 2025-03-28 PROCEDURE — 99223 1ST HOSP IP/OBS HIGH 75: CPT | Performed by: FAMILY MEDICINE

## 2025-03-28 PROCEDURE — 63710000001 INSULIN REGULAR HUMAN PER 5 UNITS: Performed by: EMERGENCY MEDICINE

## 2025-03-28 PROCEDURE — 5A09457 ASSISTANCE WITH RESPIRATORY VENTILATION, 24-96 CONSECUTIVE HOURS, CONTINUOUS POSITIVE AIRWAY PRESSURE: ICD-10-PCS | Performed by: INTERNAL MEDICINE

## 2025-03-28 PROCEDURE — 71045 X-RAY EXAM CHEST 1 VIEW: CPT

## 2025-03-28 PROCEDURE — 94640 AIRWAY INHALATION TREATMENT: CPT

## 2025-03-28 PROCEDURE — 83605 ASSAY OF LACTIC ACID: CPT

## 2025-03-28 PROCEDURE — 93010 ELECTROCARDIOGRAM REPORT: CPT | Performed by: INTERNAL MEDICINE

## 2025-03-28 RX ORDER — ENOXAPARIN SODIUM 100 MG/ML
40 INJECTION SUBCUTANEOUS DAILY
Status: DISCONTINUED | OUTPATIENT
Start: 2025-03-29 | End: 2025-03-31 | Stop reason: HOSPADM

## 2025-03-28 RX ORDER — SODIUM CHLORIDE 0.9 % (FLUSH) 0.9 %
10 SYRINGE (ML) INJECTION EVERY 12 HOURS SCHEDULED
Status: DISCONTINUED | OUTPATIENT
Start: 2025-03-28 | End: 2025-03-31 | Stop reason: HOSPADM

## 2025-03-28 RX ORDER — AZITHROMYCIN 250 MG/1
250 TABLET, FILM COATED ORAL EVERY 24 HOURS
Status: DISCONTINUED | OUTPATIENT
Start: 2025-03-29 | End: 2025-03-31 | Stop reason: HOSPADM

## 2025-03-28 RX ORDER — LORAZEPAM 2 MG/ML
0.5 INJECTION INTRAMUSCULAR EVERY 6 HOURS PRN
Status: DISCONTINUED | OUTPATIENT
Start: 2025-03-28 | End: 2025-03-29

## 2025-03-28 RX ORDER — FUROSEMIDE 20 MG/1
20 TABLET ORAL DAILY
Status: DISCONTINUED | OUTPATIENT
Start: 2025-03-29 | End: 2025-03-31 | Stop reason: HOSPADM

## 2025-03-28 RX ORDER — NICOTINE 21 MG/24HR
1 PATCH, TRANSDERMAL 24 HOURS TRANSDERMAL
Status: DISCONTINUED | OUTPATIENT
Start: 2025-03-29 | End: 2025-03-31 | Stop reason: HOSPADM

## 2025-03-28 RX ORDER — SODIUM CHLORIDE 9 MG/ML
40 INJECTION, SOLUTION INTRAVENOUS AS NEEDED
Status: DISCONTINUED | OUTPATIENT
Start: 2025-03-28 | End: 2025-03-31 | Stop reason: HOSPADM

## 2025-03-28 RX ORDER — BISACODYL 10 MG
10 SUPPOSITORY, RECTAL RECTAL DAILY PRN
Status: DISCONTINUED | OUTPATIENT
Start: 2025-03-28 | End: 2025-03-31 | Stop reason: HOSPADM

## 2025-03-28 RX ORDER — NICOTINE POLACRILEX 4 MG
15 LOZENGE BUCCAL
Status: DISCONTINUED | OUTPATIENT
Start: 2025-03-28 | End: 2025-03-31 | Stop reason: HOSPADM

## 2025-03-28 RX ORDER — AMOXICILLIN 250 MG
2 CAPSULE ORAL 2 TIMES DAILY PRN
Status: DISCONTINUED | OUTPATIENT
Start: 2025-03-28 | End: 2025-03-31 | Stop reason: HOSPADM

## 2025-03-28 RX ORDER — NICOTINE 21 MG/24HR
1 PATCH, TRANSDERMAL 24 HOURS TRANSDERMAL
Status: DISCONTINUED | OUTPATIENT
Start: 2025-03-29 | End: 2025-03-28

## 2025-03-28 RX ORDER — PAROXETINE 20 MG/1
20 TABLET, FILM COATED ORAL EVERY MORNING
Status: DISCONTINUED | OUTPATIENT
Start: 2025-03-29 | End: 2025-03-29

## 2025-03-28 RX ORDER — BISACODYL 5 MG/1
5 TABLET, DELAYED RELEASE ORAL DAILY PRN
Status: DISCONTINUED | OUTPATIENT
Start: 2025-03-28 | End: 2025-03-31 | Stop reason: HOSPADM

## 2025-03-28 RX ORDER — DEXTROSE MONOHYDRATE 25 G/50ML
25 INJECTION, SOLUTION INTRAVENOUS
Status: DISCONTINUED | OUTPATIENT
Start: 2025-03-28 | End: 2025-03-31 | Stop reason: HOSPADM

## 2025-03-28 RX ORDER — IPRATROPIUM BROMIDE AND ALBUTEROL SULFATE 2.5; .5 MG/3ML; MG/3ML
3 SOLUTION RESPIRATORY (INHALATION) ONCE
Status: COMPLETED | OUTPATIENT
Start: 2025-03-28 | End: 2025-03-28

## 2025-03-28 RX ORDER — SODIUM CHLORIDE 0.9 % (FLUSH) 0.9 %
10 SYRINGE (ML) INJECTION AS NEEDED
Status: DISCONTINUED | OUTPATIENT
Start: 2025-03-28 | End: 2025-03-31 | Stop reason: HOSPADM

## 2025-03-28 RX ORDER — INSULIN LISPRO 100 [IU]/ML
2-9 INJECTION, SOLUTION INTRAVENOUS; SUBCUTANEOUS
Status: DISCONTINUED | OUTPATIENT
Start: 2025-03-28 | End: 2025-03-31 | Stop reason: HOSPADM

## 2025-03-28 RX ORDER — IPRATROPIUM BROMIDE AND ALBUTEROL SULFATE 2.5; .5 MG/3ML; MG/3ML
3 SOLUTION RESPIRATORY (INHALATION) 3 TIMES DAILY
Status: DISCONTINUED | OUTPATIENT
Start: 2025-03-28 | End: 2025-03-29

## 2025-03-28 RX ORDER — IBUPROFEN 600 MG/1
1 TABLET ORAL
Status: DISCONTINUED | OUTPATIENT
Start: 2025-03-28 | End: 2025-03-31 | Stop reason: HOSPADM

## 2025-03-28 RX ORDER — GUAIFENESIN/DEXTROMETHORPHAN 100-10MG/5
5 SYRUP ORAL EVERY 4 HOURS PRN
Status: DISCONTINUED | OUTPATIENT
Start: 2025-03-28 | End: 2025-03-31 | Stop reason: HOSPADM

## 2025-03-28 RX ORDER — POLYETHYLENE GLYCOL 3350 17 G/17G
17 POWDER, FOR SOLUTION ORAL DAILY PRN
Status: DISCONTINUED | OUTPATIENT
Start: 2025-03-28 | End: 2025-03-31 | Stop reason: HOSPADM

## 2025-03-28 RX ORDER — ONDANSETRON 2 MG/ML
4 INJECTION INTRAMUSCULAR; INTRAVENOUS EVERY 6 HOURS PRN
Status: DISCONTINUED | OUTPATIENT
Start: 2025-03-28 | End: 2025-03-31 | Stop reason: HOSPADM

## 2025-03-28 RX ORDER — SODIUM CHLORIDE 9 MG/ML
75 INJECTION, SOLUTION INTRAVENOUS CONTINUOUS
Status: ACTIVE | OUTPATIENT
Start: 2025-03-28 | End: 2025-03-29

## 2025-03-28 RX ORDER — GUAIFENESIN 600 MG/1
600 TABLET, EXTENDED RELEASE ORAL EVERY 12 HOURS SCHEDULED
Status: DISCONTINUED | OUTPATIENT
Start: 2025-03-28 | End: 2025-03-31 | Stop reason: HOSPADM

## 2025-03-28 RX ORDER — IPRATROPIUM BROMIDE AND ALBUTEROL SULFATE 2.5; .5 MG/3ML; MG/3ML
3 SOLUTION RESPIRATORY (INHALATION) EVERY 4 HOURS PRN
Status: DISCONTINUED | OUTPATIENT
Start: 2025-03-28 | End: 2025-03-31 | Stop reason: HOSPADM

## 2025-03-28 RX ORDER — FUROSEMIDE 20 MG/1
20 TABLET ORAL DAILY
COMMUNITY

## 2025-03-28 RX ADMIN — IPRATROPIUM BROMIDE AND ALBUTEROL SULFATE 3 ML: .5; 3 SOLUTION RESPIRATORY (INHALATION) at 10:40

## 2025-03-28 RX ADMIN — ONDANSETRON 4 MG: 2 INJECTION INTRAMUSCULAR; INTRAVENOUS at 17:51

## 2025-03-28 RX ADMIN — HYDROMORPHONE HYDROCHLORIDE 0.5 MG: 1 INJECTION, SOLUTION INTRAMUSCULAR; INTRAVENOUS; SUBCUTANEOUS at 17:53

## 2025-03-28 RX ADMIN — IPRATROPIUM BROMIDE AND ALBUTEROL SULFATE 3 ML: .5; 3 SOLUTION RESPIRATORY (INHALATION) at 22:42

## 2025-03-28 RX ADMIN — IPRATROPIUM BROMIDE AND ALBUTEROL SULFATE 3 ML: .5; 3 SOLUTION RESPIRATORY (INHALATION) at 16:04

## 2025-03-28 RX ADMIN — AZITHROMYCIN 500 MG: 500 INJECTION, POWDER, LYOPHILIZED, FOR SOLUTION INTRAVENOUS at 16:23

## 2025-03-28 RX ADMIN — INSULIN HUMAN 7 UNITS: 100 INJECTION, SOLUTION PARENTERAL at 16:14

## 2025-03-28 RX ADMIN — HYDROMORPHONE HYDROCHLORIDE 0.5 MG: 1 INJECTION, SOLUTION INTRAMUSCULAR; INTRAVENOUS; SUBCUTANEOUS at 23:33

## 2025-03-28 RX ADMIN — Medication 10 ML: at 22:59

## 2025-03-28 RX ADMIN — SODIUM CHLORIDE 75 ML/HR: 9 INJECTION, SOLUTION INTRAVENOUS at 22:59

## 2025-03-28 NOTE — H&P
[facility]   HISTORY AND PHYSICAL    Patient Name: Isha Llanes  : 1965  MRN: [id number]  Primary Care Physician:  Virgil Salas MD  Date of admission: 3/28/2025    Subjective   Subjective     Chief Complaint: Confusion, shortness of breath    HPI:    Isha Llanes is a 59 y.o. female with past medical history of diabetes, hypertension, laryngeal cancer status post feeding tube placement, Hodgkin's lymphoma, squamous cell carcinoma, dysphagia, CHF, COPD, lupus, ABY, and GERD presented to the ED for evaluation of increased confusion, lethargy, and shortness of breath.  Patient was very nauseous and confused when seen so history is given by daughter at bedside.  Per daughter patient has not been doing well the last few days with persistent nausea, vomiting, and abdominal discomfort.  Since this morning patient has been short of breath, lethargic and and confused.  Of note patient also has dysphagia but continues to eat and drink p.o.  Due to her worsening confusion patient was brought to the ED for further evaluation.  In the ED patient was tachycardic and hypoxic on arrival requiring oxygen supplementation via nasal cannula and eventually BiPAP.  ABG showed that he was in hypercapnic respiratory failure with acidosis and labs showed leukocytosis, hyperglycemia, electrolyte imbalances, and elevated BUN/creatinine ratio.  Chest x-ray showed prominent finding in the infrahilar area which could be possible infiltrate.  When seen patient was confused and in distress so she was not able to answer questions.  Patient admitted for further evaluation and treatment.    Review of Systems   Patient confused, distress    Personal History     Past Medical History:   Diagnosis Date    Anesthesia     REPORTS HAS GOT REAL EMOTIONAL AND HAS BECOME ANGRY BEFORE    Anxiety     Aortic stenosis, severe     HAS BIO VALVE REPLACED    Arthritis     Asthma     Back pain     Blood clotting disorder     Cancer      Cancer associated pain     HODGKINS LYMPHOMA    CHF (congestive heart failure)     Chronic low back pain with sciatica     Chronic pain disorder     COPD (chronic obstructive pulmonary disease)     Coronary artery disease     Diabetes mellitus     TYPE 2    Diabetes mellitus     Dyspnea on effort     GERD (gastroesophageal reflux disease)     H/O lumpectomy     H/O: hysterectomy     Hiatal hernia     History of degenerative disc disease     History of kidney stones     History of pulmonary embolus (PE)     History of transfusion     AS A CHILD NO TRANSFUSION REACTION    History of transfusion     Hodgkin's lymphoma     5/19/23  5 YEARS SINCE LAST CHEMO TX    Hypertension     Immobility     Liver disease     DENIES ANY CURRENT ISSUES    Lupus     Mitral valve prolapse     Nausea vomiting and diarrhea 3/7/2024    Panic disorder     Pelvic pain     Peripheral neuropathy     Personal history of DVT (deep vein thrombosis)     Presence of intrathecal pump     PTSD (post-traumatic stress disorder)     Sacroiliac joint disease     SI (sacroiliac) joint inflammation     Sleep apnea     Venous insufficiency        Past Surgical History:   Procedure Laterality Date    ABDOMINAL SURGERY      BACK SURGERY      SPINAL FUSION     BACK SURGERY      BLADDER REPAIR      CARDIAC CATHETERIZATION N/A 03/06/2019    Procedure: Valvuloplasty;  Surgeon: Wayne Johnson MD;  Location: [facility] INVASIVE LOCATION;  Service: Cardiology    CARDIAC CATHETERIZATION      CARDIAC CATHETERIZATION Left 5/31/2023    Procedure: Cardiac Catheterization/Vascular Study;  Surgeon: Poncho Reid MD;  Location: [facility] CATH INVASIVE LOCATION;  Service: Cardiovascular;  Laterality: Left;    CARDIAC SURGERY      CARDIAC VALVE REPLACEMENT  2021    CHOLECYSTECTOMY      COLONOSCOPY N/A 12/29/2021    Procedure: COLONOSCOPY;  Surgeon: Karine Orlando MD;  Location: [facility] ENDOSCOPY;  Service: Gastroenterology;  Laterality: N/A;  POOR  PREP    COLONOSCOPY N/A 04/13/2022    Procedure: COLONOSCOPY WITH POLYPECTOMY;  Surgeon: Karine Orlando MD;  Location: [facility] ENDOSCOPY;  Service: Gastroenterology;  Laterality: N/A;  COLON POLYP, HEMORRHOIDS, POOR PREP    COLONOSCOPY      DIRECT LARYNGOSCOPY, ESOPHAGOSCOPY, BRONCHOSCOPY N/A 5/22/2023    Procedure: DIRECT LARYNGOSCOPY WITH BIOPSIES , ESOPHAGOSCOPY, BRONCHOSCOPY;  Surgeon: Ronnie Mcgarry MD;  Location: [facility] OR [facility];  Service: ENT;  Laterality: N/A;    ENDOSCOPY N/A 12/29/2021    Procedure: ESOPHAGOGASTRODUODENOSCOPY;  Surgeon: Karine Orlando MD;  Location: [facility] ENDOSCOPY;  Service: Gastroenterology;  Laterality: N/A;  ESOPHAGITIS, GASTRITIS, HIATAL HERNIA    ENDOSCOPY      ENDOSCOPY N/A 4/16/2024    Procedure: ESOPHAGOGASTRODUODENOSCOPY WITH BIOPSIES;  Surgeon: Karine Orlando MD;  Location: [facility] ENDOSCOPY;  Service: Gastroenterology;  Laterality: N/A;  ESOPHAGITIS, HIATAL HERNIA    ENDOSCOPY W/ PEG TUBE PLACEMENT N/A 12/10/2024    Procedure: ESOPHAGOGASTRODUODENOSCOPY WITH PERCUTANEOUS ENDOSCOPIC GASTROSTOMY TUBE INSERTION WITH ANESTHESIA;  Surgeon: Rodger Ortega MD;  Location: [facility] ENDOSCOPY;  Service: Gastroenterology;  Laterality: N/A;  PEG TUBE INSERTION    HYSTERECTOMY      LYMPHADENECTOMY      PAIN PUMP INSERTION/REVISION N/A 10/18/2019    Procedure: PAIN PUMP INSERTION [facility] 10-18-19 [place];  Surgeon: Horace Rios MD;  Location: [facility] OR;  Service: Pain Management    PAIN PUMP INSERTION/REVISION N/A 11/23/2020    Procedure: pain pump removal;  Surgeon: Horace Rios MD;  Location: [facility] OR;  Service: Pain Management;  Laterality: N/A;    PEG TUBE INSERTION N/A 7/26/2023    Procedure: PERCUTANEOUS ENDOSCOPIC GASTROSTOMY TUBE INSERTION;  Surgeon: Kody Mercer MD;  Location: [facility] ENDOSCOPY;  Service: General;  Laterality: N/A;  SUCCESSFUL PEG TUBE PLACE PLACEMENT    PORTACATH PLACEMENT      IN LEFT CHEST REPORTS THAT  IT IS NOT FUNCTIONING    SKIN BIOPSY      TONSILLECTOMY      TUBAL ABDOMINAL LIGATION      TUMOR REMOVAL         Family History: family history includes ADD / ADHD in her brother, granddaughter, grandson, and nephew; Anxiety disorder in her mother; Bipolar disorder in her sister; Depression in her sister; Heart failure in her mother; Pancreatic cancer in her paternal grandmother and sister; Prostate cancer in her father; Thyroid cancer in her maternal grandmother. Otherwise pertinent FHx was reviewed and not pertinent to current issue.    Social History:  reports that she has been smoking cigarettes. She started smoking about 46 years ago. She has a 69.4 pack-year smoking history. She has been exposed to tobacco smoke. She has never used smokeless tobacco. She reports that she does not drink alcohol and does not use drugs.    Home Medications:  ALPRAZolam, Insulin Lispro (1 Unit Dial), PARoxetine, acetaminophen, albuterol sulfate HFA, famotidine, furosemide, loperamide, mupirocin, naloxone, ondansetron ODT, pain, polyethyl glycol-propyl glycol, and predniSONE      Allergies:  Allergies   Allergen Reactions    Amoxicillin Shortness Of Breath     Has tolerated Cefazolin, Ceftriaxone, and Cefepime -Jermaine Melida, [facility]    Ceclor [Cefaclor] Shortness Of Breath     Has tolerated Cefazolin, Ceftriaxone, and Cefepime -[facility], [facility]      Penicillins Shortness Of Breath     Has tolerated Cefazolin, Ceftriaxone, and Cefepime -Jermaine Melida, [facility]    Sulfa Antibiotics Rash    Valium [Diazepam] Mental Status Change     DEPRESSED    Ambien [Zolpidem] Mental Status Change    Aspirin GI Intolerance    Ativan [Lorazepam] Mental Status Change    Benadryl [Diphenhydramine] Anxiety     AND MAKES HER HYPER    Biaxin [Clarithromycin] Unknown - Low Severity    Cephalexin Unknown - Low Severity    Clindamycin Unknown - Low Severity    Compazine [Prochlorperazine Edisylate] Rash    Contrast Dye (Echo Or Unknown Ct/Mr) Other (See  Comments)     Caused pain in her arm    Doxycycline Rash    Nsaids GI Intolerance    Phenergan [Promethazine Hcl] GI Intolerance    Promethazine GI Intolerance    Vancomycin Itching       Objective   Objective     Vitals:   Temp:  [98.4 °F (36.9 °C)] 98.4 °F (36.9 °C)  Heart Rate:  [] 88  Resp:  [13-18] 18  BP: (106-110)/(58-84) 110/58  Flow (L/min) (Oxygen Therapy):  [2] 2  Physical Exam    Constitutional: Awake, lethargic, ill-appearing, confused   Eyes: PERRLA, sclerae anicteric, no conjunctival injection   HENT: NCAT, mucous membranes dry   Neck: Supple, no thyromegaly, no lymphadenopathy, trachea midline   Respiratory: Wheezing, Rales, marked tachypnea, on BiPAP   Cardiovascular: RRR, no murmurs, rubs, or gallops, palpable pedal pulses bilaterally   Gastrointestinal: Positive bowel sounds, soft, nontender, nondistended, PEG tube in place   Musculoskeletal: No bilateral ankle edema, no clubbing or cyanosis to extremities   Psychiatric: Appropriate affect, cooperative   Neurologic: Oriented x 3, strength symmetric in all extremities, Cranial Nerves grossly intact to confrontation, speech clear   Skin: No rashes     Result Review    Result Review:  I have personally reviewed the results from the time of this admission to 3/28/2025 16:47 EDT and agree with these findings:  [x]  Laboratory list / accordion  []  Microbiology  [x]  Radiology  [x]  EKG/Telemetry   []  Cardiology/Vascular   []  Pathology  []  Old records  []  Other:  Most notable findings include: ABG showed that he was in hypercapnic respiratory failure with acidosis and labs showed leukocytosis, hyperglycemia, electrolyte imbalances, and elevated BUN/creatinine ratio.  Chest x-ray showed prominent finding in the infrahilar area which could be possible infiltrat      Assessment & Plan   Assessment / Plan     Brief Patient Summary:  Isha Llanes is a 59 y.o. female with past medical history of diabetes, hypertension, laryngeal cancer status  post feeding tube placement, Hodgkin's lymphoma, squamous cell carcinoma, dysphagia, CHF, COPD, lupus, ABY, and GERD presented to the ED for evaluation of increased confusion, lethargy, and shortness of breath.    Active Hospital Problems:  Active Hospital Problems    Diagnosis     **Acute on chronic respiratory failure with hypoxia     Acute on chronic respiratory failure with hypercapnia     Intractable nausea and vomiting     Generalized weakness     AMS (altered mental status)     History of laryngeal cancer     Malignant neoplasm of supraglottis     Hodgkin lymphoma     Tobacco abuse     Throat pain     Laryngeal cancer     GERD (gastroesophageal reflux disease)     Depression     CAD (coronary artery disease)     Diabetes mellitus, type 2     CHF (congestive heart failure)     Acute exacerbation of chronic obstructive pulmonary disease (COPD)     Chronic pain syndrome      Plan:     Acute on chronic respiratory failure with hypercapnia  -Admit to telemetry  -Secondary to COPD with superimposed ABY  -Imaging reviewed  -Supplemental oxygen as needed, wean down as tolerated  -ABG reviewed  -BiPAP support  -IV Solu-Medrol  -DuoNeb 3 times daily and as needed  -Empiric antibiotics  -Mucinex, Tessalon Perles  -Incentive spirometry  -Adjust home meds if warranted  -Consult pulmonology if warranted  -Supportive care    Hodgkin's lymphoma/laryngeal  -Patient follows up at [facility]  -Worsening metastatic disease noted  -Antiemetics as needed  -Patient with pain pump  -As needed pain meds  -Also oncology if warranted     Diabetes  -Insulin sliding scale  -Levemir at bedtime  -Titrate as needed     HTN  -Currently well controlled  -PRN BP meds  -Resume home meds when available  -Titrate if needed     Lower extremity cellulitis     COPD  ABY  Lupus  Aortic stenosis status post TAVR     GI ppx  DVT ppx          VTE Prophylaxis:  Pharmacologic VTE prophylaxis orders are present.        CODE STATUS:        Admission Status:  I believe this patient meets inpatient status.      Electronically signed by Luis Pal MD, 03/28/25, 4:47 PM EDT.

## 2025-03-28 NOTE — Clinical Note
Level of Care: Remote Telemetry [26]   Diagnosis: Acute on chronic respiratory failure with hypercapnia [[id number]]   Admitting Physician: DAYRON BRIDGES [[id number]]   Certification: I Certify That Inpatient Hospital Services Are Medically Necessary For Greater Than 2 Midnights

## 2025-03-28 NOTE — ED PROVIDER NOTES
Time: 2:33 PM EDT  Date of encounter:  3/28/2025  Independent Historian/Clinical History and Information was obtained by:   Patient, Nursing Staff, and EMS    History is limited by: Altered Mental Status    Chief Complaint: Mental status change      History of Present Illness:  Patient is a 59 y.o. year old female who presents to the emergency department for evaluation of mental status change and shortness of air.  Patient has a history of COPD and presents for evaluation.      Patient Care Team  Primary Care Provider: Virgil Salas MD    Past Medical History:     Allergies   Allergen Reactions    Amoxicillin Shortness Of Breath     Has tolerated Cefazolin, Ceftriaxone, and Cefepime -Jermaine Melida, [facility]    Ceclor [Cefaclor] Shortness Of Breath     Has tolerated Cefazolin, Ceftriaxone, and Cefepime -Jermaine Melida, [facility]      Penicillins Shortness Of Breath     Has tolerated Cefazolin, Ceftriaxone, and Cefepime -Jermaine Melida, [facility]    Sulfa Antibiotics Rash    Valium [Diazepam] Mental Status Change     DEPRESSED    Ambien [Zolpidem] Mental Status Change    Aspirin GI Intolerance    Ativan [Lorazepam] Mental Status Change    Benadryl [Diphenhydramine] Anxiety     AND MAKES HER HYPER    Biaxin [Clarithromycin] Unknown - Low Severity    Cephalexin Unknown - Low Severity    Clindamycin Unknown - Low Severity    Compazine [Prochlorperazine Edisylate] Rash    Contrast Dye (Echo Or Unknown Ct/Mr) Other (See Comments)     Caused pain in her arm    Doxycycline Rash    Nsaids GI Intolerance    Phenergan [Promethazine Hcl] GI Intolerance    Promethazine GI Intolerance    Vancomycin Itching     Past Medical History:   Diagnosis Date    Anesthesia     REPORTS HAS GOT REAL EMOTIONAL AND HAS BECOME ANGRY BEFORE    Anxiety     Aortic stenosis, severe     HAS BIO VALVE REPLACED    Arthritis     Asthma     Back pain     Blood clotting disorder     Cancer     Cancer associated pain     HODGKINS LYMPHOMA    CHF (congestive  heart failure)     Chronic low back pain with sciatica     Chronic pain disorder     COPD (chronic obstructive pulmonary disease)     Coronary artery disease     Diabetes mellitus     TYPE 2    Diabetes mellitus     Dyspnea on effort     GERD (gastroesophageal reflux disease)     H/O lumpectomy     H/O: hysterectomy     Hiatal hernia     History of degenerative disc disease     History of kidney stones     History of pulmonary embolus (PE)     History of transfusion     AS A CHILD NO TRANSFUSION REACTION    History of transfusion     Hodgkin's lymphoma     5/19/23  5 YEARS SINCE LAST CHEMO TX    Hypertension     Immobility     Liver disease     DENIES ANY CURRENT ISSUES    Lupus     Mitral valve prolapse     Nausea vomiting and diarrhea 3/7/2024    Panic disorder     Pelvic pain     Peripheral neuropathy     Personal history of DVT (deep vein thrombosis)     Presence of intrathecal pump     PTSD (post-traumatic stress disorder)     Sacroiliac joint disease     SI (sacroiliac) joint inflammation     Sleep apnea     Venous insufficiency      Past Surgical History:   Procedure Laterality Date    ABDOMINAL SURGERY      BACK SURGERY      SPINAL FUSION     BACK SURGERY      BLADDER REPAIR      CARDIAC CATHETERIZATION N/A 03/06/2019    Procedure: Valvuloplasty;  Surgeon: Wayne Johnson MD;  Location: [facility] INVASIVE LOCATION;  Service: Cardiology    CARDIAC CATHETERIZATION      CARDIAC CATHETERIZATION Left 5/31/2023    Procedure: Cardiac Catheterization/Vascular Study;  Surgeon: Poncho Reid MD;  Location: [facility] CATH INVASIVE LOCATION;  Service: Cardiovascular;  Laterality: Left;    CARDIAC SURGERY      CARDIAC VALVE REPLACEMENT  2021    CHOLECYSTECTOMY      COLONOSCOPY N/A 12/29/2021    Procedure: COLONOSCOPY;  Surgeon: Karine Orlando MD;  Location: [facility] ENDOSCOPY;  Service: Gastroenterology;  Laterality: N/A;  POOR PREP    COLONOSCOPY N/A 04/13/2022    Procedure: COLONOSCOPY WITH  POLYPECTOMY;  Surgeon: Karine Orlando MD;  Location: [facility] ENDOSCOPY;  Service: Gastroenterology;  Laterality: N/A;  COLON POLYP, HEMORRHOIDS, POOR PREP    COLONOSCOPY      DIRECT LARYNGOSCOPY, ESOPHAGOSCOPY, BRONCHOSCOPY N/A 5/22/2023    Procedure: DIRECT LARYNGOSCOPY WITH BIOPSIES , ESOPHAGOSCOPY, BRONCHOSCOPY;  Surgeon: Ronnie Mcgarry MD;  Location: [facility] OR OSC;  Service: ENT;  Laterality: N/A;    ENDOSCOPY N/A 12/29/2021    Procedure: ESOPHAGOGASTRODUODENOSCOPY;  Surgeon: Karine Orlando MD;  Location: [facility] ENDOSCOPY;  Service: Gastroenterology;  Laterality: N/A;  ESOPHAGITIS, GASTRITIS, HIATAL HERNIA    ENDOSCOPY      ENDOSCOPY N/A 4/16/2024    Procedure: ESOPHAGOGASTRODUODENOSCOPY WITH BIOPSIES;  Surgeon: Karine Orlando MD;  Location: [facility] ENDOSCOPY;  Service: Gastroenterology;  Laterality: N/A;  ESOPHAGITIS, HIATAL HERNIA    ENDOSCOPY W/ PEG TUBE PLACEMENT N/A 12/10/2024    Procedure: ESOPHAGOGASTRODUODENOSCOPY WITH PERCUTANEOUS ENDOSCOPIC GASTROSTOMY TUBE INSERTION WITH ANESTHESIA;  Surgeon: Rodger Ortega MD;  Location: [facility] ENDOSCOPY;  Service: Gastroenterology;  Laterality: N/A;  PEG TUBE INSERTION    HYSTERECTOMY      LYMPHADENECTOMY      PAIN PUMP INSERTION/REVISION N/A 10/18/2019    Procedure: PAIN PUMP INSERTION [facility] [place];  Surgeon: Horace Rios MD;  Location: [facility];  Service: Pain Management    PAIN PUMP INSERTION/REVISION N/A 11/23/2020    Procedure: pain pump removal;  Surgeon: Horace Rios MD;  Location: [facility];  Service: Pain Management;  Laterality: N/A;    PEG TUBE INSERTION N/A 7/26/2023    Procedure: PERCUTANEOUS ENDOSCOPIC GASTROSTOMY TUBE INSERTION;  Surgeon: Kody Mercer MD;  Location: [facility] ENDOSCOPY;  Service: General;  Laterality: N/A;  SUCCESSFUL PEG TUBE PLACE PLACEMENT    PORTACATH PLACEMENT      IN LEFT CHEST REPORTS THAT IT IS NOT FUNCTIONING    SKIN BIOPSY      TONSILLECTOMY       TUBAL ABDOMINAL LIGATION      TUMOR REMOVAL       Family History   Problem Relation Age of Onset    Heart failure Mother     Anxiety disorder Mother     Prostate cancer Father     Depression Sister     Bipolar disorder Sister     Pancreatic cancer Sister     ADD / ADHD Brother     Thyroid cancer Maternal Grandmother     Pancreatic cancer Paternal Grandmother     ADD / ADHD Grandson     ADD / ADHD Granddaughter     ADD / ADHD Nephew     [place] Hyperthermia Neg Hx        Home Medications:  Prior to Admission medications    Medication Sig Start Date End Date Taking? Authorizing Provider   acetaminophen (Tylenol) 325 MG tablet Take 1 tablet by mouth Every 6 (Six) Hours As Needed for Mild Pain, Headache or Fever.    Nette Jiménez MD   albuterol sulfate  (90 Base) MCG/ACT inhaler Inhale 2 puffs Every 4 (Four) Hours As Needed for Wheezing. 11/20/23   Katia Wright MD   ALPRAZolam (XANAX) 0.25 MG tablet Take 2 tablets by mouth 2 (Two) Times a Day As Needed. 11/25/24   Nette Jiménez MD   famotidine (Pepcid) 40 MG tablet Take 1 tablet by mouth every night at bedtime. 3/27/25   Sobeida Espinosa APRN   Insulin Lispro, 1 Unit Dial, (HUMALOG) 100 UNIT/ML solution pen-injector Inject 12 units daily 2/14/25   Nette Jiménez MD   loperamide (IMODIUM) 2 MG capsule Take 1 capsule by mouth 4 (Four) Times a Day As Needed for Diarrhea (2 with 1st episode of diarrhea and 1 with each additonal episode, max of 8 per day.). 3/27/25   Sobeida Espinosa APRN   mupirocin (BACTROBAN) 2 % ointment Apply 1 Application topically to the appropriate area as directed 3 (Three) Times a Day. 4/10/24   Katia Wright MD   naloxone (NARCAN) 4 MG/0.1ML nasal spray Call 911. Don't prime. [place] in 1 nostril for overdose. Repeat in 2-3 minutes in other nostril if no or minimal breathing/responsiveness. 12/12/24   Ruperto Abdi MD   ondansetron ODT (ZOFRAN-ODT) 8 MG disintegrating tablet  3/27/25   Nette Jiménez  "MD   pain patient supplied pump by Intrathecal route Continuous. Type of Medication: Hydromorphone 15 mg/ml and Bupivacaine 0.5 mg/ml  [employer] [phone]  Provider:Dr. Boudreaux  Provider number: [phone]  Basal Dose: Hydromorphone 7.518 mg/day Bupivacaine 0.50311 mg/day  Pump capacity: 40ml  ( MAX of Hydromorphone 9.456 mg/ day; Bupivacaine 0.54248 mg /day)  Bolus Dose:Hydromorphone 0.500 mg, Bupivacaine 0.78043 mg  Max activations: 4 per day  Lockout 3 hours. 1 Bolus per every 3 hours   Next refill-  01/07/2025  Alarm date- 01/12/2025  Pump manufacture:MedManny Escobar MD   PARoxetine (PAXIL) 20 MG tablet  3/27/25   Nette Jiménez MD   predniSONE (DELTASONE) 20 MG tablet Take 2 tablets by mouth 2 (Two) Times a Day. 12/23/24   Goyo Nunez MD   Systane 0.4-0.3 % solution ophthalmic solution (artificial tears) Instill 1 TO 2 drops IN each eye 3 TO 4 times daily for dry eyes 1/31/25   Provider, MD Nette        Social History:   Social History     Tobacco Use    Smoking status: Every Day     Current packs/day: 1.50     Average packs/day: 1.5 packs/day for 46.2 years (69.4 ttl pk-yrs)     Types: Cigarettes     Start date: 1979     Passive exposure: Current    Smokeless tobacco: Never   Vaping Use    Vaping status: Never Used   Substance Use Topics    Alcohol use: Never    Drug use: Never         Review of Systems:  Review of Systems   Unable to perform ROS: Mental status change        Physical Exam:  /58   Pulse 88   Temp 98.4 °F (36.9 °C) (Oral)   Resp 18   Ht 167.6 cm (66\")   Wt 62.4 kg (137 lb 9.6 oz)   LMP  (LMP Unknown)   SpO2 98%   BMI 22.21 kg/m²     Physical Exam  Vitals and nursing note reviewed.   Constitutional:       General: She is in acute distress.      Appearance: Normal appearance. She is ill-appearing. She is not toxic-appearing.   HENT:      Head: Normocephalic and atraumatic.      Jaw: There is normal jaw occlusion.   Eyes:      General: Lids are " normal.      Extraocular Movements: Extraocular movements intact.      Conjunctiva/sclera: Conjunctivae normal.      Pupils: Pupils are equal, round, and reactive to light.   Cardiovascular:      Rate and Rhythm: Normal rate and regular rhythm.      Pulses: Normal pulses.      Heart sounds: Normal heart sounds.   Pulmonary:      Effort: Tachypnea, accessory muscle usage and respiratory distress present.      Breath sounds: Wheezing present. No rhonchi.   Abdominal:      General: Abdomen is flat.      Palpations: Abdomen is soft.      Tenderness: There is no abdominal tenderness. There is no guarding or rebound.   Musculoskeletal:         General: Normal range of motion.      Cervical back: Normal range of motion and neck supple.      Right lower leg: No edema.      Left lower leg: No edema.   Skin:     General: Skin is warm and dry.   Neurological:      Mental Status: Mental status is at baseline.                    Medical Decision Making:      Comorbidities that affect care:    COPD    External Notes reviewed:    None      The following orders were placed and all results were independently analyzed by me:  Orders Placed This Encounter   Procedures    COVID PRE-OP / PRE-PROCEDURE SCREENING ORDER (NO ISOLATION) - Swab, Nasopharynx    COVID-19, FLU A/B, RSV PCR 1 HR TAT - Swab, Nasopharynx    XR Chest 1 View    [place] Draw    Comprehensive Metabolic Panel    BNP    High Sensitivity Troponin T    CBC Auto Differential    Arterial Blood Gas + Electrolytes    Blood Gas, Arterial -    High Sensitivity Troponin T 1Hr    Diet: Diabetic; Consistent Carbohydrate; Fluid Consistency: Thin (IDDSI 0)    Undress & Gown    Continuous Pulse Oximetry    Vital Signs    Inpatient Hospitalist Consult    Oxygen Therapy- Nasal Cannula; Titrate 1-6 LPM Per SpO2; 90 - 95%    NIPPV (CPAP or BIPAP)    POC Glucose Once    POC Lactate    POC Electrolyte Panel    POC Glucose 4x Daily Before Meals & at Bedtime    ECG 12 Lead ED Triage Standing  Order; SOA    Insert Peripheral IV    Inpatient Admission    CBC & Differential    Green Top (Gel)    Lavender Top    Gold Top - SST    Light Blue Top       Medications Given in the Emergency Department:  Medications   sodium chloride 0.9 % flush 10 mL (has no administration in time range)   azithromycin (ZITHROMAX) 500 mg in sodium chloride 0.9 % 250 mL IVPB-VTB (500 mg Intravenous New Bag 3/28/25 1623)   dextrose (GLUTOSE) oral gel 15 g (has no administration in time range)   dextrose (D50W) (25 g/50 mL) IV injection 25 g (has no administration in time range)   glucagon (GLUCAGEN) injection 1 mg (has no administration in time range)   insulin glargine (LANTUS, SEMGLEE) injection 20 Units (has no administration in time range)   Insulin Lispro (humaLOG) injection 2-9 Units (has no administration in time range)   ipratropium-albuterol (DUO-NEB) nebulizer solution 3 mL (3 mL Nebulization Given 3/28/25 1040)   insulin regular (humuLIN R,novoLIN R) injection 7 Units (7 Units Intravenous Given 3/28/25 1614)   ipratropium-albuterol (DUO-NEB) nebulizer solution 3 mL (3 mL Nebulization Given 3/28/25 1604)        ED Course:         Labs:    Lab Results (last 24 hours)       Procedure Component Value Units Date/Time    COVID PRE-OP / PRE-PROCEDURE SCREENING ORDER (NO ISOLATION) - Swab, Nasopharynx [[id number]]  (Normal) Collected: 03/28/25 0928    Specimen: Swab from Nasopharynx Updated: 03/28/25 1012    Narrative:      The following orders were created for panel order COVID PRE-OP / PRE-PROCEDURE SCREENING ORDER (NO ISOLATION) - Swab, Nasopharynx.  Procedure                               Abnormality         Status                     ---------                               -----------         ------                     COVID-19, FLU A/B, RSV P...[[id number]]  Normal              Final result                 Please view results for these tests on the individual orders.    COVID-19, FLU A/B, RSV PCR 1 HR TAT - Swab, Nasopharynx  [[id number]]  (Normal) Collected: 03/28/25 0928    Specimen: Swab from Nasopharynx Updated: 03/28/25 1012     COVID19 Not Detected     Influenza A PCR Not Detected     Influenza B PCR Not Detected     RSV, PCR Not Detected    Narrative:      Fact sheet for providers: https://www.fda.gov/media/530089/download    Fact sheet for patients: https://www.fda.gov/media/492731/download    Test performed by PCR.    CBC & Differential [[id number]]  (Abnormal) Collected: 03/28/25 0943    Specimen: Blood from Arm, Left Updated: 03/28/25 0957    Narrative:      The following orders were created for panel order CBC & Differential.  Procedure                               Abnormality         Status                     ---------                               -----------         ------                     CBC Auto Differential[[id number]]        Abnormal            Final result                 Please view results for these tests on the individual orders.    BNP [[id number]]  (Normal) Collected: 03/28/25 0943    Specimen: Blood from Arm, Left Updated: 03/28/25 1012     proBNP 655.4 pg/mL     Narrative:      This assay is used as an aid in the diagnosis of individuals suspected of having heart failure. It can be used as an aid in the diagnosis of acute decompensated heart failure (ADHF) in patients presenting with signs and symptoms of ADHF to the emergency department (ED). In addition, NT-proBNP of <300 pg/mL indicates ADHF is not likely.    Age Range Result Interpretation  NT-proBNP Concentration (pg/mL:      <50             Positive            >450                   Gray                 300-450                    Negative             <300    50-75           Positive            >900                  Gray                300-900                  Negative            <300      >75             Positive            >1800                  Gray                300-1800                  Negative            <300    CBC Auto Differential [[id number]]   (Abnormal) Collected: 03/28/25 0943    Specimen: Blood from Arm, Left Updated: 03/28/25 0957     WBC 12.01 10*3/mm3      RBC 5.26 10*6/mm3      Hemoglobin 14.2 g/dL      Hematocrit 47.2 %      MCV 89.7 fL      MCH 27.0 pg      MCHC 30.1 g/dL      RDW 14.8 %      RDW-SD 49.1 fl      MPV 10.6 fL      Platelets 165 10*3/mm3      Neutrophil % 88.2 %      Lymphocyte % 5.2 %      Monocyte % 4.7 %      Eosinophil % 0.4 %      Basophil % 0.3 %      Immature Grans % 1.2 %      Neutrophils, Absolute 10.59 10*3/mm3      Lymphocytes, Absolute 0.62 10*3/mm3      Monocytes, Absolute 0.57 10*3/mm3      Eosinophils, Absolute 0.05 10*3/mm3      Basophils, Absolute 0.04 10*3/mm3      Immature Grans, Absolute 0.14 10*3/mm3      nRBC 0.0 /100 WBC     Comprehensive Metabolic Panel [[id number]]  (Abnormal) Collected: 03/28/25 1041    Specimen: Blood from Arm, Left Updated: 03/28/25 1118     Glucose 361 mg/dL      BUN 18 mg/dL      Creatinine 0.53 mg/dL      Sodium 133 mmol/L      Potassium 4.7 mmol/L      Comment: Specimen hemolyzed.  Result may be falsely elevated.        Chloride 94 mmol/L      CO2 30.1 mmol/L      Calcium 8.5 mg/dL      Total Protein 6.7 g/dL      Albumin 3.7 g/dL      ALT (SGPT) 26 U/L      Comment: Specimen hemolyzed.  Result may  be falsely elevated.        AST (SGOT) 33 U/L      Comment: Specimen hemolyzed.  Result may be falsely elevated.        Alkaline Phosphatase 73 U/L      Total Bilirubin 0.3 mg/dL      Globulin 3.0 gm/dL      A/G Ratio 1.2 g/dL      BUN/Creatinine Ratio 34.0     Anion Gap 8.9 mmol/L      eGFR 106.7 mL/min/1.73     Narrative:      GFR Categories in Chronic Kidney Disease (CKD)      GFR Category          GFR (mL/min/1.73)    Interpretation  G1                     90 or greater         Normal or high (1)  G2                      60-89                Mild decrease (1)  G3a                   45-59                Mild to moderate decrease  G3b                   30-44                Moderate to  severe decrease  G4                    15-29                Severe decrease  G5                    14 or less           Kidney failure          (1)In the absence of evidence of kidney disease, neither GFR category G1 or G2 fulfill the criteria for CKD.    eGFR calculation 2021 CKD-EPI creatinine equation, which does not include race as a factor    High Sensitivity Troponin T [[id number]]  (Abnormal) Collected: 03/28/25 1041    Specimen: Blood from Arm, Left Updated: 03/28/25 1118     HS Troponin T 34 ng/L      Comment: Specimen hemolyzed.  Results may be falsely decreased.       Narrative:      High Sensitive Troponin T Reference Range:  <14.0 ng/L- Negative Female for AMI  <22.0 ng/L- Negative Male for AMI  >=14 - Abnormal Female indicating possible myocardial injury.  >=22 - Abnormal Male indicating possible myocardial injury.   Clinicians would have to utilize clinical acumen, EKG, Troponin, and serial changes to determine if it is an Acute Myocardial Infarction or myocardial injury due to an underlying chronic condition.         POC Glucose Once [[id number]]  (Abnormal) Collected: 03/28/25 1100    Specimen: Arterial Blood Updated: 03/28/25 1103     Glucose 409 mg/dL      Comment: Serial Number: [id number]:  [id number]       Blood Gas, Arterial - [[id number]]  (Abnormal) Collected: 03/28/25 1100    Specimen: Arterial Blood Updated: 03/28/25 1103     Site Left Brachial     Shane's Test N/A     pH, Arterial 7.309 pH units      pCO2, Arterial 68.5 mm Hg      pO2, Arterial 87.9 mm Hg      HCO3, Arterial 34.4 mmol/L      Base Excess, Arterial 5.5 mmol/L      Comment: Serial Number: [id number]:  [id number]        O2 Saturation, Arterial 95.3 %      Hemoglobin, Blood Gas 15.1 g/dL      Hematocrit, Blood Gas 44.0 %      Barometric Pressure for Blood Gas 748.4000 mmHg      Modality Cannula     Flow Rate 2.0000 lpm      Notified Who Luis A     Read Back Yes     Notified Time --     Hemodilution No    POC Lactate [[id number]]   (Normal) Collected: 03/28/25 1100    Specimen: Arterial Blood Updated: 03/28/25 1103     Lactate 0.7 mmol/L      Comment: Serial Number: [id number]:  [id number]       POC Electrolyte Panel [[id number]]  (Abnormal) Collected: 03/28/25 1100    Specimen: Arterial Blood Updated: 03/28/25 1103     Sodium 138 mmol/L      POC Potassium 4.2 mmol/L      Chloride 94 mmol/L      Ionized Calcium 1.23 mmol/L      Comment: Serial Number: [id number]:  [id number]                Imaging:    XR Chest 1 View  Result Date: 3/28/2025  XR CHEST 1 VW Date of Exam: 3/28/2025 9:38 AM EDT Indication: SOA Triage Protocol laryngeal malignancy. Comparison: March 18, 2025 Findings: Port catheter is noted with its tip in the superior vena cava. Heart not enlarged. There are prominent markings right infrahilar area which in part could relate to shadows from pulmonary vessels. An infiltrate in the right middle/lower lobe not excluded.  Prosthetic heart valve is noted. There are no pleural effusions.     Impression: Prominent markings right infrahilar area which in part could relate to shadows from pulmonary vessels. An infiltrate in the right middle/lower lobe not excluded. Electronically Signed: Bear Wright MD  3/28/2025 10:01 AM EDT  Workstation ID: [id number]        Differential Diagnosis and Discussion:    Altered Mental Status: Based on the patient's signs and symptoms, differential diagnosis includes but is not limited to meningitis, stroke, sepsis, subarachnoid hemorrhage, intracranial bleeding, encephalitis, and metabolic encephalopathy.  Dyspnea: Differential diagnosis includes but is not limited to metabolic acidosis, neurological disorders, psychogenic, asthma, pneumothorax, upper airway obstruction, COPD, pneumonia, noncardiogenic pulmonary edema, interstitial lung disease, anemia, congestive heart failure, and pulmonary embolism    PROCEDURES:    Labs were collected in the emergency department and all labs were reviewed and interpreted by  me.  X-ray were performed in the emergency department and all X-ray impressions were independently interpreted by me.  An EKG was performed and the EKG was interpreted by me.    ECG 12 Lead ED Triage Standing Order; SOA   Preliminary Result   HEART WMDT=350  bpm   RR Tycvtjcz=405  ms   NE Lcyrplmj=149  ms   P Horizontal Axis=9  deg   P Front Axis=73  deg   QRSD Zbeljkxo=735  ms   QT Ssrwtpao=550  ms   QAlV=171  ms   QRS Axis=32  deg   T Wave Axis=148  deg   - ABNORMAL ECG -   Sinus tachycardia   Consider right atrial enlargement   Left bundle branch block   Date and Time of Study:2025-03-28 09:37:31        My interpretation of EKG shows sinus tachycardia, tachycardic rate, normal QT, no acute ischemia    Procedures    MDM  Number of Diagnoses or Management Options  Acute exacerbation of chronic obstructive pulmonary disease (COPD)  Acute respiratory failure with hypoxia and hypercapnia  Diagnosis management comments: In summary this is a 59-year-old female who presents for evaluation of shortness of breath and mental status change in the setting of known COPD.  CBC independently reviewed and interpreted by me and shows no critical abnormalities.  CMP independently reviewed and interpreted by me and shows no critical abnormalities except hyperglycemia without changes of DKA..  ABG independently reviewed interpreted by me reveals mild hypercapnia.  Chest x-ray reviewed by me shows no acute pathology.  Patient was given breathing treatment, antibiotics and placed on BiPAP.  Patient case has been discussed with the hospitalist team who will admit to the hospital for further evaluation and continuation of treatment.             The patient presents with respiratory distress.  The patient does require supplemental oxygen.  Patient was placed on the cardiac monitor after given breathing treatments and being placed on BiPAP.  They were monitored for ventricular ectopy, arrhythmia, tachycardia, hypoxia, and changes in blood  pressure.  Continuous pulse oximetry was placed in waveform with corresponding oxygen saturation was assessed throughout their stay in the emergency department.  Patient was rechecked several times in the ED for decline in mental status and worsening distress.    Total Critical Care time of 35 minutes. Total critical care time documented does not include time spent on separately billed procedures for services of nurses or physician assistants. I personally saw and examined the patient. I have reviewed all diagnostic interpretations and treatment plans as written. I was present for the key portions of any procedures performed and the inclusive time noted in any critical care statement. Critical care time includes patient management by me, time spent at the patients bedside,  time to review lab and imaging results, discussing patient care, documentation in the medical record, and time spent with family or caregiver.          Patient Care Considerations:    CONSULT: I considered consulting pulmonology, however this can be accomplished inpatient      Consultants/Shared Management Plan:    Hospitalist: I have discussed the case with Dr. Pal who agrees to accept the patient for admission.    Social Determinants of Health:    Patient has presented with family members who are responsible, reliable and will ensure follow up care.      Disposition and Care Coordination:    Admit:   Through independent evaluation of the patient's history, physical, and imperical data, the patient meets criteria for inpatient admission to the hospital.        Final diagnoses:   Acute respiratory failure with hypoxia and hypercapnia   Acute exacerbation of chronic obstructive pulmonary disease (COPD)        ED Disposition       ED Disposition   Decision to Admit    Condition   --    Comment   Level of Care: Remote Telemetry [26]   Diagnosis: Acute on chronic respiratory failure with hypercapnia [[id number]]   Admitting Physician: YULIANA  DAYRON [[id number]]   Certification: I Certify That Inpatient Hospital Services Are Medically Necessary For Greater Than 2 Midnights                 This medical record created using voice recognition software.             Sebastian Gunn MD  03/28/25 0440

## 2025-03-28 NOTE — PROGRESS NOTES
RT EQUIPMENT DEVICE RELATED - SKIN ASSESSMENT    RT Medical Equipment/Device:     NIV Mask:  Under-the-nose   size:  B    Skin Assessment:      Cheek:  Intact  Nose:  Intact  Lips:  Intact  Mouth:  Intact    Device Skin Pressure Protection:  Pressure points protected    Nurse Notification:  No    Nhi Anand, RRT   Patient has small marks on left side of face that have scabs. When patient asked about marks patient said she had cancer.

## 2025-03-29 ENCOUNTER — APPOINTMENT (OUTPATIENT)
Dept: CT IMAGING | Facility: HOSPITAL | Age: 60
DRG: 189 | End: 2025-03-29
Payer: COMMERCIAL

## 2025-03-29 LAB
ANION GAP SERPL CALCULATED.3IONS-SCNC: 8.9 MMOL/L (ref 5–15)
B PARAPERT DNA SPEC QL NAA+PROBE: NOT DETECTED
B PERT DNA SPEC QL NAA+PROBE: NOT DETECTED
BUN SERPL-MCNC: 13 MG/DL (ref 6–20)
BUN/CREAT SERPL: 28.9 (ref 7–25)
C PNEUM DNA NPH QL NAA+NON-PROBE: NOT DETECTED
CALCIUM SPEC-SCNC: 8.3 MG/DL (ref 8.6–10.5)
CHLORIDE SERPL-SCNC: 96 MMOL/L (ref 98–107)
CO2 SERPL-SCNC: 33.1 MMOL/L (ref 22–29)
CREAT SERPL-MCNC: 0.45 MG/DL (ref 0.57–1)
DEPRECATED RDW RBC AUTO: 48.8 FL (ref 37–54)
EGFRCR SERPLBLD CKD-EPI 2021: 111 ML/MIN/1.73
ERYTHROCYTE [DISTWIDTH] IN BLOOD BY AUTOMATED COUNT: 14.6 % (ref 12.3–15.4)
FLUAV SUBTYP SPEC NAA+PROBE: NOT DETECTED
FLUBV RNA ISLT QL NAA+PROBE: NOT DETECTED
GLUCOSE BLDC GLUCOMTR-MCNC: 128 MG/DL (ref 70–99)
GLUCOSE BLDC GLUCOMTR-MCNC: 135 MG/DL (ref 70–99)
GLUCOSE BLDC GLUCOMTR-MCNC: 184 MG/DL (ref 70–99)
GLUCOSE BLDC GLUCOMTR-MCNC: 222 MG/DL (ref 70–99)
GLUCOSE SERPL-MCNC: 265 MG/DL (ref 65–99)
HADV DNA SPEC NAA+PROBE: NOT DETECTED
HCOV 229E RNA SPEC QL NAA+PROBE: NOT DETECTED
HCOV HKU1 RNA SPEC QL NAA+PROBE: NOT DETECTED
HCOV NL63 RNA SPEC QL NAA+PROBE: NOT DETECTED
HCOV OC43 RNA SPEC QL NAA+PROBE: NOT DETECTED
HCT VFR BLD AUTO: 44 % (ref 34–46.6)
HGB BLD-MCNC: 13 G/DL (ref 12–15.9)
HMPV RNA NPH QL NAA+NON-PROBE: NOT DETECTED
HPIV1 RNA ISLT QL NAA+PROBE: NOT DETECTED
HPIV2 RNA SPEC QL NAA+PROBE: NOT DETECTED
HPIV3 RNA NPH QL NAA+PROBE: NOT DETECTED
HPIV4 P GENE NPH QL NAA+PROBE: NOT DETECTED
L PNEUMO1 AG UR QL IA: NEGATIVE
M PNEUMO IGG SER IA-ACNC: NOT DETECTED
MCH RBC QN AUTO: 26.7 PG (ref 26.6–33)
MCHC RBC AUTO-ENTMCNC: 29.5 G/DL (ref 31.5–35.7)
MCV RBC AUTO: 90.3 FL (ref 79–97)
MRSA DNA SPEC QL NAA+PROBE: NORMAL
PLATELET # BLD AUTO: 145 10*3/MM3 (ref 140–450)
PMV BLD AUTO: 10.6 FL (ref 6–12)
POTASSIUM SERPL-SCNC: 4.7 MMOL/L (ref 3.5–5.2)
PROCALCITONIN SERPL-MCNC: 0.07 NG/ML (ref 0–0.25)
RBC # BLD AUTO: 4.87 10*6/MM3 (ref 3.77–5.28)
RHINOVIRUS RNA SPEC NAA+PROBE: NOT DETECTED
RSV RNA NPH QL NAA+NON-PROBE: NOT DETECTED
S PNEUM AG SPEC QL LA: NEGATIVE
SARS-COV-2 RNA RESP QL NAA+PROBE: NOT DETECTED
SODIUM SERPL-SCNC: 138 MMOL/L (ref 136–145)
WBC NRBC COR # BLD AUTO: 8.58 10*3/MM3 (ref 3.4–10.8)

## 2025-03-29 PROCEDURE — 25010000002 CEFEPIME PER 500 MG: Performed by: INTERNAL MEDICINE

## 2025-03-29 PROCEDURE — 87449 NOS EACH ORGANISM AG IA: CPT | Performed by: INTERNAL MEDICINE

## 2025-03-29 PROCEDURE — 25010000002 ONDANSETRON PER 1 MG: Performed by: FAMILY MEDICINE

## 2025-03-29 PROCEDURE — 94664 DEMO&/EVAL PT USE INHALER: CPT

## 2025-03-29 PROCEDURE — 71250 CT THORAX DX C-: CPT

## 2025-03-29 PROCEDURE — 84145 PROCALCITONIN (PCT): CPT | Performed by: INTERNAL MEDICINE

## 2025-03-29 PROCEDURE — 25010000002 LORAZEPAM PER 2 MG: Performed by: FAMILY MEDICINE

## 2025-03-29 PROCEDURE — 80048 BASIC METABOLIC PNL TOTAL CA: CPT | Performed by: FAMILY MEDICINE

## 2025-03-29 PROCEDURE — 87641 MR-STAPH DNA AMP PROBE: CPT | Performed by: INTERNAL MEDICINE

## 2025-03-29 PROCEDURE — 25010000002 FUROSEMIDE PER 20 MG: Performed by: INTERNAL MEDICINE

## 2025-03-29 PROCEDURE — 94799 UNLISTED PULMONARY SVC/PX: CPT

## 2025-03-29 PROCEDURE — 25010000002 ENOXAPARIN PER 10 MG: Performed by: FAMILY MEDICINE

## 2025-03-29 PROCEDURE — 99223 1ST HOSP IP/OBS HIGH 75: CPT | Performed by: INTERNAL MEDICINE

## 2025-03-29 PROCEDURE — 82948 REAGENT STRIP/BLOOD GLUCOSE: CPT

## 2025-03-29 PROCEDURE — 63710000001 INSULIN GLARGINE PER 5 UNITS: Performed by: FAMILY MEDICINE

## 2025-03-29 PROCEDURE — 82948 REAGENT STRIP/BLOOD GLUCOSE: CPT | Performed by: FAMILY MEDICINE

## 2025-03-29 PROCEDURE — 94761 N-INVAS EAR/PLS OXIMETRY MLT: CPT

## 2025-03-29 PROCEDURE — 99233 SBSQ HOSP IP/OBS HIGH 50: CPT | Performed by: INTERNAL MEDICINE

## 2025-03-29 PROCEDURE — 25010000002 METHYLPREDNISOLONE PER 40 MG: Performed by: FAMILY MEDICINE

## 2025-03-29 PROCEDURE — 0202U NFCT DS 22 TRGT SARS-COV-2: CPT | Performed by: INTERNAL MEDICINE

## 2025-03-29 PROCEDURE — 85027 COMPLETE CBC AUTOMATED: CPT | Performed by: FAMILY MEDICINE

## 2025-03-29 PROCEDURE — 87040 BLOOD CULTURE FOR BACTERIA: CPT | Performed by: INTERNAL MEDICINE

## 2025-03-29 PROCEDURE — 25810000003 SODIUM CHLORIDE 0.9 % SOLUTION: Performed by: INTERNAL MEDICINE

## 2025-03-29 PROCEDURE — 25010000002 HYDROMORPHONE 1 MG/ML SOLUTION: Performed by: FAMILY MEDICINE

## 2025-03-29 PROCEDURE — 25010000002 HYDROMORPHONE 1 MG/ML SOLUTION: Performed by: INTERNAL MEDICINE

## 2025-03-29 RX ORDER — ARFORMOTEROL TARTRATE 15 UG/2ML
15 SOLUTION RESPIRATORY (INHALATION)
Status: DISCONTINUED | OUTPATIENT
Start: 2025-03-29 | End: 2025-03-31 | Stop reason: HOSPADM

## 2025-03-29 RX ORDER — ALPRAZOLAM 0.25 MG
0.25 TABLET ORAL 3 TIMES DAILY PRN
Status: DISCONTINUED | OUTPATIENT
Start: 2025-03-29 | End: 2025-03-31 | Stop reason: HOSPADM

## 2025-03-29 RX ORDER — ALPRAZOLAM 0.25 MG
0.25 TABLET ORAL 3 TIMES DAILY PRN
Status: DISCONTINUED | OUTPATIENT
Start: 2025-03-29 | End: 2025-03-29 | Stop reason: SDUPTHER

## 2025-03-29 RX ORDER — IPRATROPIUM BROMIDE AND ALBUTEROL SULFATE 2.5; .5 MG/3ML; MG/3ML
3 SOLUTION RESPIRATORY (INHALATION)
Status: DISCONTINUED | OUTPATIENT
Start: 2025-03-29 | End: 2025-03-31 | Stop reason: HOSPADM

## 2025-03-29 RX ORDER — ALPRAZOLAM 0.25 MG
0.25 TABLET ORAL 3 TIMES DAILY
Status: DISCONTINUED | OUTPATIENT
Start: 2025-03-29 | End: 2025-03-29

## 2025-03-29 RX ORDER — FAMOTIDINE 20 MG/1
40 TABLET, FILM COATED ORAL NIGHTLY
Status: DISCONTINUED | OUTPATIENT
Start: 2025-03-29 | End: 2025-03-31 | Stop reason: HOSPADM

## 2025-03-29 RX ORDER — ALBUTEROL SULFATE 0.83 MG/ML
2.5 SOLUTION RESPIRATORY (INHALATION) EVERY 4 HOURS PRN
Status: DISCONTINUED | OUTPATIENT
Start: 2025-03-29 | End: 2025-03-31 | Stop reason: HOSPADM

## 2025-03-29 RX ORDER — BUDESONIDE 0.5 MG/2ML
0.5 INHALANT ORAL
Status: DISCONTINUED | OUTPATIENT
Start: 2025-03-29 | End: 2025-03-31 | Stop reason: HOSPADM

## 2025-03-29 RX ORDER — FUROSEMIDE 10 MG/ML
40 INJECTION INTRAMUSCULAR; INTRAVENOUS
Status: DISCONTINUED | OUTPATIENT
Start: 2025-03-29 | End: 2025-03-31 | Stop reason: HOSPADM

## 2025-03-29 RX ADMIN — ARFORMOTEROL TARTRATE 15 MCG: 15 SOLUTION RESPIRATORY (INHALATION) at 10:22

## 2025-03-29 RX ADMIN — ENOXAPARIN SODIUM 40 MG: 100 INJECTION SUBCUTANEOUS at 09:00

## 2025-03-29 RX ADMIN — GUAIFENESIN 600 MG: 600 TABLET ORAL at 08:59

## 2025-03-29 RX ADMIN — AZITHROMYCIN DIHYDRATE 250 MG: 250 TABLET ORAL at 17:18

## 2025-03-29 RX ADMIN — ALPRAZOLAM 0.25 MG: 0.25 TABLET ORAL at 18:35

## 2025-03-29 RX ADMIN — Medication 10 ML: at 08:59

## 2025-03-29 RX ADMIN — NICOTINE 1 PATCH: 21 PATCH, EXTENDED RELEASE TRANSDERMAL at 00:00

## 2025-03-29 RX ADMIN — INSULIN GLARGINE 20 UNITS: 100 INJECTION, SOLUTION SUBCUTANEOUS at 20:06

## 2025-03-29 RX ADMIN — IPRATROPIUM BROMIDE AND ALBUTEROL SULFATE 3 ML: .5; 3 SOLUTION RESPIRATORY (INHALATION) at 15:22

## 2025-03-29 RX ADMIN — HYDROMORPHONE HYDROCHLORIDE 0.5 MG: 1 INJECTION, SOLUTION INTRAMUSCULAR; INTRAVENOUS; SUBCUTANEOUS at 04:02

## 2025-03-29 RX ADMIN — HYDROMORPHONE HYDROCHLORIDE 0.5 MG: 1 INJECTION, SOLUTION INTRAMUSCULAR; INTRAVENOUS; SUBCUTANEOUS at 07:26

## 2025-03-29 RX ADMIN — FUROSEMIDE 20 MG: 20 TABLET ORAL at 08:59

## 2025-03-29 RX ADMIN — SODIUM CHLORIDE 1000 ML: 9 INJECTION, SOLUTION INTRAVENOUS at 08:59

## 2025-03-29 RX ADMIN — Medication 10 ML: at 20:06

## 2025-03-29 RX ADMIN — GUAIFENESIN 600 MG: 600 TABLET ORAL at 20:06

## 2025-03-29 RX ADMIN — SODIUM CHLORIDE 2000 MG: 9 INJECTION, SOLUTION INTRAVENOUS at 08:58

## 2025-03-29 RX ADMIN — IPRATROPIUM BROMIDE AND ALBUTEROL SULFATE 3 ML: .5; 3 SOLUTION RESPIRATORY (INHALATION) at 10:22

## 2025-03-29 RX ADMIN — ONDANSETRON 4 MG: 2 INJECTION INTRAMUSCULAR; INTRAVENOUS at 17:23

## 2025-03-29 RX ADMIN — FUROSEMIDE 40 MG: 10 INJECTION, SOLUTION INTRAMUSCULAR; INTRAVENOUS at 17:41

## 2025-03-29 RX ADMIN — HYDROMORPHONE HYDROCHLORIDE 0.25 MG: 1 INJECTION, SOLUTION INTRAMUSCULAR; INTRAVENOUS; SUBCUTANEOUS at 21:16

## 2025-03-29 RX ADMIN — SODIUM CHLORIDE 2000 MG: 9 INJECTION, SOLUTION INTRAVENOUS at 17:18

## 2025-03-29 RX ADMIN — METHYLPREDNISOLONE SODIUM SUCCINATE 40 MG: 40 INJECTION INTRAMUSCULAR; INTRAVENOUS at 00:04

## 2025-03-29 RX ADMIN — HYDROMORPHONE HYDROCHLORIDE 0.25 MG: 1 INJECTION, SOLUTION INTRAMUSCULAR; INTRAVENOUS; SUBCUTANEOUS at 17:18

## 2025-03-29 RX ADMIN — LORAZEPAM 0.5 MG: 2 INJECTION INTRAMUSCULAR; INTRAVENOUS at 00:01

## 2025-03-29 RX ADMIN — BUDESONIDE 0.5 MG: 0.5 SUSPENSION RESPIRATORY (INHALATION) at 10:22

## 2025-03-29 RX ADMIN — IPRATROPIUM BROMIDE AND ALBUTEROL SULFATE 3 ML: .5; 3 SOLUTION RESPIRATORY (INHALATION) at 07:57

## 2025-03-29 NOTE — PLAN OF CARE
Goal Outcome Evaluation:  Plan of Care Reviewed With: patient        Progress: no change  Outcome Evaluation: patient is an admission from the ED this shift. patient is alert and oriented x4 on 3L nc and continuous pulse ox. Patient was noncompliant with wound care and wound pictures this shift. Patient was noncompliant with bipap this shift. Patient up x2 to bsc gait unsteady and needed heavy assistance with returning to the bed, patient refusing to use bedpan and attempts to leave the bed independently. Bed alarm audible and in place. Patient became more lethargic and had increased labored breathing, STAT ABG ordered per standing order. Patient ignores staff and will not respond to staff questions. Pain treated per MAR. VSS. Patient resting between care.

## 2025-03-29 NOTE — PAYOR COMM NOTE
Isha Llanes (59 y.o. Female)            AUTHORIZATION REQUEST for EMERGENCY DEPARTMENT ADMISSION        PATIENT INFORMATION  Name:             Isha Llanes  MRN#:            [id number]        ADMISSION INFORMATION  CLASS:          Inpatient   DOS:               3/28/2025        ADMISSION DIAGNOSIS AND HOSPITAL PROBLEMS  ADMISSION DIAGNOSIS  Acute exacerbation of chronic obstructive pulmonary disease (COPD) [J44.1]  Acute on chronic respiratory failure with hypercapnia [J96.22]  Acute on chronic respiratory failure with hypoxia [J96.21]  Acute respiratory failure with hypoxia and hypercapnia [J96.01, J96.02]        ADMITTING PROVIDER INFORMATION  Name/NPI       Víctor Draper MD [[id number]]  Phone:            Phone: [phone]        RENDERING FACILITY  Name:             [facility]   NPI:                 [id number]  TID:                 [id number]  Address:        [address] [place] [address]        CASE MANAGEMENT CONTACT INFORMATION  Name:             SRINI Dumont  Phone:            [phone]  Fax:                [phone]   Date of Birth   1965    Social Security Number       Address   [address] [place], [place] [address]    Home Phone   [phone]    MRN   [id number]       Amish   None    Marital Status   Legally                             Admission Date   3/28/2025    Admission Type   Emergency    Admitting Provider   Luis Pal MD    Attending Provider   Víctor Draper MD    Department, Room/Bed   [facility] 4TH FLOOR MEDICAL TELEMETRY UNIT, 425/2       Discharge Date       Discharge Disposition       Discharge Destination                                 Attending Provider: Víctor Draper MD    Allergies: Amoxicillin, Ceclor [Cefaclor], Penicillins, Sulfa Antibiotics, Valium [Diazepam], Ambien [Zolpidem], Aspirin, Ativan [Lorazepam], Benadryl [Diphenhydramine], Biaxin [Clarithromycin], Cephalexin,  "Clindamycin, Compazine [Prochlorperazine Edisylate], Contrast Dye (Echo Or Unknown Ct/mr), Doxycycline, Nsaids, Phenergan [Promethazine Hcl], Promethazine, Vancomycin    Isolation: Spore   Infection: C.difficile (rule out) (03/27/25)   Code Status: Prior    Ht: 167.6 cm (66\")   Wt: 66.1 kg (145 lb 11.6 oz)    Admission Cmt: None   Principal Problem: Acute on chronic respiratory failure with hypoxia [J96.21]                   Active Insurance as of 3/28/2025       Primary Coverage       Payor Plan Insurance Group Employer/Plan Group    [employer] BY [employer] [id number]       Payor Plan Address Payor Plan Phone Number Payor Plan Fax Number Effective Dates    [address] [address]   1/1/2021 - None Entered    [facility] [phone]         Subscriber Name Subscriber Birth Date Member ID       RENÉE ABARCA 1965 [id number]                     Emergency Contacts        (Rel.) Home Phone Work Phone Mobile Phone    Sheila Watson (Daughter) [phone] -- [phone]             Respiratory Failure GRG Clinical Indications for Admission to Inpatient Care       Indications Met   Last updated by Dionne Rodas on 3/28/2025 1949     Review Status Created By   Primary Completed Dionne Rodas      Criteria Review   Respiratory Failure GRG Clinical Indications for Admission to Inpatient Care     Overall Determination: Indications Met     Criteria:  [×] Hospital admission is needed for appropriate care of the patient because of  1 or more  of the following  (1) (2) (3) (4) (5) (6) (7) (8):      [×] New (acute) need for mechanical invasive or noninvasive (eg, bilevel positive airway pressure (BPAP), volume-assured pressure support (VAPS), or volume control) ventilation (9) (10) (11) (12) (13) (14) (15)          3/28/2025  7:49 PM              -- 3/28/2025  7:49 PM by Dionne Rodas --                  BiPAP applied     Notes:  -- 3/28/2025  7:49 PM by Dionne Rodas --      " Subject: Admission            *       presents to the emergency department for evaluation of mental status change and shortness of air.      *       Constitutional:        General: She is in acute distress.        Appearance: Normal appearance. She is ill-appearing. She is not toxic-appearing.       *       Pulmonary:        Effort: Tachypnea, accessory muscle usage and respiratory distress present.        Breath sounds: Wheezing present. No rhonchi.       *       Mental Status: Mental status is at baseline.      *       pH 7.309, pCO2 68.5 - BiPAP applied                History & Physical        Luis Pal MD at 25 1640           [facility]   HISTORY AND PHYSICAL    Patient Name: Isha Llanes  : 1965  MRN: [id number]  Primary Care Physician:  Virgil Salas MD  Date of admission: 3/28/2025    Subjective  Subjective     Chief Complaint: Confusion, shortness of breath    HPI:    Isha Llanes is a 59 y.o. female with past medical history of diabetes, hypertension, laryngeal cancer status post feeding tube placement, Hodgkin's lymphoma, squamous cell carcinoma, dysphagia, CHF, COPD, lupus, ABY, and GERD presented to the ED for evaluation of increased confusion, lethargy, and shortness of breath.  Patient was very nauseous and confused when seen so history is given by daughter at bedside.  Per daughter patient has not been doing well the last few days with persistent nausea, vomiting, and abdominal discomfort.  Since this morning patient has been short of breath, lethargic and and confused.  Of note patient also has dysphagia but continues to eat and drink p.o.  Due to her worsening confusion patient was brought to the ED for further evaluation.  In the ED patient was tachycardic and hypoxic on arrival requiring oxygen supplementation via nasal cannula and eventually BiPAP.  ABG showed that he was in hypercapnic respiratory failure with acidosis and labs showed leukocytosis,  hyperglycemia, electrolyte imbalances, and elevated BUN/creatinine ratio.  Chest x-ray showed prominent finding in the infrahilar area which could be possible infiltrate.  When seen patient was confused and in distress so she was not able to answer questions.  Patient admitted for further evaluation and treatment.    Review of Systems   Patient confused, distress    Personal History     Past Medical History:   Diagnosis Date    Anesthesia     REPORTS HAS GOT REAL EMOTIONAL AND HAS BECOME ANGRY BEFORE    Anxiety     Aortic stenosis, severe     HAS BIO VALVE REPLACED    Arthritis     Asthma     Back pain     Blood clotting disorder     Cancer     Cancer associated pain     HODGKINS LYMPHOMA    CHF (congestive heart failure)     Chronic low back pain with sciatica     Chronic pain disorder     COPD (chronic obstructive pulmonary disease)     Coronary artery disease     Diabetes mellitus     TYPE 2    Diabetes mellitus     Dyspnea on effort     GERD (gastroesophageal reflux disease)     H/O lumpectomy     H/O: hysterectomy     Hiatal hernia     History of degenerative disc disease     History of kidney stones     History of pulmonary embolus (PE)     History of transfusion     AS A CHILD NO TRANSFUSION REACTION    History of transfusion     Hodgkin's lymphoma     5/19/23  5 YEARS SINCE LAST CHEMO TX    Hypertension     Immobility     Liver disease     DENIES ANY CURRENT ISSUES    Lupus     Mitral valve prolapse     Nausea vomiting and diarrhea 3/7/2024    Panic disorder     Pelvic pain     Peripheral neuropathy     Personal history of DVT (deep vein thrombosis)     Presence of intrathecal pump     PTSD (post-traumatic stress disorder)     Sacroiliac joint disease     SI (sacroiliac) joint inflammation     Sleep apnea     Venous insufficiency        Past Surgical History:   Procedure Laterality Date    ABDOMINAL SURGERY      BACK SURGERY      SPINAL FUSION     BACK SURGERY      BLADDER REPAIR      CARDIAC CATHETERIZATION  N/A 03/06/2019    Procedure: Valvuloplasty;  Surgeon: Wayne Johnson MD;  Location: [facility] INVASIVE LOCATION;  Service: Cardiology    CARDIAC CATHETERIZATION      CARDIAC CATHETERIZATION Left 5/31/2023    Procedure: Cardiac Catheterization/Vascular Study;  Surgeon: Poncho Reid MD;  Location: [facility] CATH INVASIVE LOCATION;  Service: Cardiovascular;  Laterality: Left;    CARDIAC SURGERY      CARDIAC VALVE REPLACEMENT  2021    CHOLECYSTECTOMY      COLONOSCOPY N/A 12/29/2021    Procedure: COLONOSCOPY;  Surgeon: Karine Orlando MD;  Location: [facility] ENDOSCOPY;  Service: Gastroenterology;  Laterality: N/A;  POOR PREP    COLONOSCOPY N/A 04/13/2022    Procedure: COLONOSCOPY WITH POLYPECTOMY;  Surgeon: Karine Orlando MD;  Location: [facility] ENDOSCOPY;  Service: Gastroenterology;  Laterality: N/A;  COLON POLYP, HEMORRHOIDS, POOR PREP    COLONOSCOPY      DIRECT LARYNGOSCOPY, ESOPHAGOSCOPY, BRONCHOSCOPY N/A 5/22/2023    Procedure: DIRECT LARYNGOSCOPY WITH BIOPSIES , ESOPHAGOSCOPY, BRONCHOSCOPY;  Surgeon: Ronnie Mcgarry MD;  Location: [facility] OR [facility];  Service: ENT;  Laterality: N/A;    ENDOSCOPY N/A 12/29/2021    Procedure: ESOPHAGOGASTRODUODENOSCOPY;  Surgeon: Karine Orlando MD;  Location: [facility] ENDOSCOPY;  Service: Gastroenterology;  Laterality: N/A;  ESOPHAGITIS, GASTRITIS, HIATAL HERNIA    ENDOSCOPY      ENDOSCOPY N/A 4/16/2024    Procedure: ESOPHAGOGASTRODUODENOSCOPY WITH BIOPSIES;  Surgeon: Karine Orlando MD;  Location: [facility] ENDOSCOPY;  Service: Gastroenterology;  Laterality: N/A;  ESOPHAGITIS, HIATAL HERNIA    ENDOSCOPY W/ PEG TUBE PLACEMENT N/A 12/10/2024    Procedure: ESOPHAGOGASTRODUODENOSCOPY WITH PERCUTANEOUS ENDOSCOPIC GASTROSTOMY TUBE INSERTION WITH ANESTHESIA;  Surgeon: Rodger Ortega MD;  Location: [facility] ENDOSCOPY;  Service: Gastroenterology;  Laterality: N/A;  PEG TUBE INSERTION    HYSTERECTOMY      LYMPHADENECTOMY      PAIN PUMP  INSERTION/REVISION N/A 10/18/2019    Procedure: PAIN PUMP INSERTION [facility] 10-18-19 [place];  Surgeon: Horace Rios MD;  Location: [facility] OR;  Service: Pain Management    PAIN PUMP INSERTION/REVISION N/A 11/23/2020    Procedure: pain pump removal;  Surgeon: Horace Rios MD;  Location: [facility] OR;  Service: Pain Management;  Laterality: N/A;    PEG TUBE INSERTION N/A 7/26/2023    Procedure: PERCUTANEOUS ENDOSCOPIC GASTROSTOMY TUBE INSERTION;  Surgeon: Kody Mercer MD;  Location: [facility] ENDOSCOPY;  Service: General;  Laterality: N/A;  SUCCESSFUL PEG TUBE PLACE PLACEMENT    PORTACATH PLACEMENT      IN LEFT CHEST REPORTS THAT IT IS NOT FUNCTIONING    SKIN BIOPSY      TONSILLECTOMY      TUBAL ABDOMINAL LIGATION      TUMOR REMOVAL         Family History: family history includes ADD / ADHD in her brother, granddaughter, grandson, and nephew; Anxiety disorder in her mother; Bipolar disorder in her sister; Depression in her sister; Heart failure in her mother; Pancreatic cancer in her paternal grandmother and sister; Prostate cancer in her father; Thyroid cancer in her maternal grandmother. Otherwise pertinent FHx was reviewed and not pertinent to current issue.    Social History:  reports that she has been smoking cigarettes. She started smoking about 46 years ago. She has a 69.4 pack-year smoking history. She has been exposed to tobacco smoke. She has never used smokeless tobacco. She reports that she does not drink alcohol and does not use drugs.    Home Medications:  ALPRAZolam, Insulin Lispro (1 Unit Dial), PARoxetine, acetaminophen, albuterol sulfate HFA, famotidine, furosemide, loperamide, mupirocin, naloxone, ondansetron ODT, pain, polyethyl glycol-propyl glycol, and predniSONE      Allergies:  Allergies   Allergen Reactions    Amoxicillin Shortness Of Breath     Has tolerated Cefazolin, Ceftriaxone, and Cefepime -Jermaine Montez [facility]    Tosin [Cefaclor] Shortness Of Breath     Has  tolerated Cefazolin, Ceftriaxone, and Cefepime -[facility]      Penicillins Shortness Of Breath     Has tolerated Cefazolin, Ceftriaxone, and Cefepime -[facility]    Sulfa Antibiotics Rash    Valium [Diazepam] Mental Status Change     DEPRESSED    Ambien [Zolpidem] Mental Status Change    Aspirin GI Intolerance    Ativan [Lorazepam] Mental Status Change    Benadryl [Diphenhydramine] Anxiety     AND MAKES HER HYPER    Biaxin [Clarithromycin] Unknown - Low Severity    Cephalexin Unknown - Low Severity    Clindamycin Unknown - Low Severity    Compazine [Prochlorperazine Edisylate] Rash    Contrast Dye (Echo Or Unknown Ct/Mr) Other (See Comments)     Caused pain in her arm    Doxycycline Rash    Nsaids GI Intolerance    Phenergan [Promethazine Hcl] GI Intolerance    Promethazine GI Intolerance    Vancomycin Itching       Objective  Objective     Vitals:   Temp:  [98.4 °F (36.9 °C)] 98.4 °F (36.9 °C)  Heart Rate:  [] 88  Resp:  [13-18] 18  BP: (106-110)/(58-84) 110/58  Flow (L/min) (Oxygen Therapy):  [2] 2  Physical Exam    Constitutional: Awake, lethargic, ill-appearing, confused   Eyes: PERRLA, sclerae anicteric, no conjunctival injection   HENT: NCAT, mucous membranes dry   Neck: Supple, no thyromegaly, no lymphadenopathy, trachea midline   Respiratory: Wheezing, Rales, marked tachypnea, on BiPAP   Cardiovascular: RRR, no murmurs, rubs, or gallops, palpable pedal pulses bilaterally   Gastrointestinal: Positive bowel sounds, soft, nontender, nondistended, PEG tube in place   Musculoskeletal: No bilateral ankle edema, no clubbing or cyanosis to extremities   Psychiatric: Appropriate affect, cooperative   Neurologic: Oriented x 3, strength symmetric in all extremities, Cranial Nerves grossly intact to confrontation, speech clear   Skin: No rashes     Result Review   Result Review:  I have personally reviewed the results from the time of this admission to 3/28/2025 16:47 EDT and agree with  these findings:  [x]  Laboratory list / accordion  []  Microbiology  [x]  Radiology  [x]  EKG/Telemetry   []  Cardiology/Vascular   []  Pathology  []  Old records  []  Other:  Most notable findings include: ABG showed that he was in hypercapnic respiratory failure with acidosis and labs showed leukocytosis, hyperglycemia, electrolyte imbalances, and elevated BUN/creatinine ratio.  Chest x-ray showed prominent finding in the infrahilar area which could be possible infiltrat      Assessment & Plan  Assessment / Plan     Brief Patient Summary:  Isha Llanes is a 59 y.o. female with past medical history of diabetes, hypertension, laryngeal cancer status post feeding tube placement, Hodgkin's lymphoma, squamous cell carcinoma, dysphagia, CHF, COPD, lupus, ABY, and GERD presented to the ED for evaluation of increased confusion, lethargy, and shortness of breath.    Active Hospital Problems:  Active Hospital Problems    Diagnosis     **Acute on chronic respiratory failure with hypoxia     Acute on chronic respiratory failure with hypercapnia     Intractable nausea and vomiting     Generalized weakness     AMS (altered mental status)     History of laryngeal cancer     Malignant neoplasm of supraglottis     Hodgkin lymphoma     Tobacco abuse     Throat pain     Laryngeal cancer     GERD (gastroesophageal reflux disease)     Depression     CAD (coronary artery disease)     Diabetes mellitus, type 2     CHF (congestive heart failure)     Acute exacerbation of chronic obstructive pulmonary disease (COPD)     Chronic pain syndrome      Plan:     Acute on chronic respiratory failure with hypercapnia  -Admit to telemetry  -Secondary to COPD with superimposed ABY  -Imaging reviewed  -Supplemental oxygen as needed, wean down as tolerated  -ABG reviewed  -BiPAP support  -IV Solu-Medrol  -DuoNeb 3 times daily and as needed  -Empiric antibiotics  -Mucinex Tessalon Perles  -Incentive spirometry  -Adjust home meds if  warranted  -Consult pulmonology if warranted  -Supportive care    Hodgkin's lymphoma/laryngeal  -Patient follows up at [facility]  -Worsening metastatic disease noted  -Antiemetics as needed  -Patient with pain pump  -As needed pain meds  -Also oncology if warranted     Diabetes  -Insulin sliding scale  -Levemir at bedtime  -Titrate as needed     HTN  -Currently well controlled  -PRN BP meds  -Resume home meds when available  -Titrate if needed     Lower extremity cellulitis     COPD  ABY  Lupus  Aortic stenosis status post TAVR     GI ppx  DVT ppx          VTE Prophylaxis:  Pharmacologic VTE prophylaxis orders are present.        CODE STATUS:       Admission Status:  I believe this patient meets inpatient status.      Electronically signed by Luis Pal MD, 03/28/25, 4:47 PM EDT.    Electronically signed by Luis Pal MD at 03/28/25 [id number]          Emergency Department Notes        Sebastian Gunn MD at 03/28/25 2383          Time: 2:33 PM EDT  Date of encounter:  3/28/2025  Independent Historian/Clinical History and Information was obtained by:   Patient, Nursing Staff, and EMS    History is limited by: Altered Mental Status    Chief Complaint: Mental status change      History of Present Illness:  Patient is a 59 y.o. year old female who presents to the emergency department for evaluation of mental status change and shortness of air.  Patient has a history of COPD and presents for evaluation.      Patient Care Team  Primary Care Provider: Virgil Salas MD    Past Medical History:     Allergies   Allergen Reactions    Amoxicillin Shortness Of Breath     Has tolerated Cefazolin, Ceftriaxone, and Cefepime -Jermaine Melida, RPH    Ceclor [Cefaclor] Shortness Of Breath     Has tolerated Cefazolin, Ceftriaxone, and Cefepime -Jermaine Melida, RPH      Penicillins Shortness Of Breath     Has tolerated Cefazolin, Ceftriaxone, and Cefepime -Jermaine Melida, RPH    Sulfa Antibiotics Rash     Valium [Diazepam] Mental Status Change     DEPRESSED    Ambien [Zolpidem] Mental Status Change    Aspirin GI Intolerance    Ativan [Lorazepam] Mental Status Change    Benadryl [Diphenhydramine] Anxiety     AND MAKES HER HYPER    Biaxin [Clarithromycin] Unknown - Low Severity    Cephalexin Unknown - Low Severity    Clindamycin Unknown - Low Severity    Compazine [Prochlorperazine Edisylate] Rash    Contrast Dye (Echo Or Unknown Ct/Mr) Other (See Comments)     Caused pain in her arm    Doxycycline Rash    Nsaids GI Intolerance    Phenergan [Promethazine Hcl] GI Intolerance    Promethazine GI Intolerance    Vancomycin Itching     Past Medical History:   Diagnosis Date    Anesthesia     REPORTS HAS GOT REAL EMOTIONAL AND HAS BECOME ANGRY BEFORE    Anxiety     Aortic stenosis, severe     HAS BIO VALVE REPLACED    Arthritis     Asthma     Back pain     Blood clotting disorder     Cancer     Cancer associated pain     HODGKINS LYMPHOMA    CHF (congestive heart failure)     Chronic low back pain with sciatica     Chronic pain disorder     COPD (chronic obstructive pulmonary disease)     Coronary artery disease     Diabetes mellitus     TYPE 2    Diabetes mellitus     Dyspnea on effort     GERD (gastroesophageal reflux disease)     H/O lumpectomy     H/O: hysterectomy     Hiatal hernia     History of degenerative disc disease     History of kidney stones     History of pulmonary embolus (PE)     History of transfusion     AS A CHILD NO TRANSFUSION REACTION    History of transfusion     Hodgkin's lymphoma     5/19/23  5 YEARS SINCE LAST CHEMO TX    Hypertension     Immobility     Liver disease     DENIES ANY CURRENT ISSUES    Lupus     Mitral valve prolapse     Nausea vomiting and diarrhea 3/7/2024    Panic disorder     Pelvic pain     Peripheral neuropathy     Personal history of DVT (deep vein thrombosis)     Presence of intrathecal pump     PTSD (post-traumatic stress disorder)     Sacroiliac joint disease     SI  (sacroiliac) joint inflammation     Sleep apnea     Venous insufficiency      Past Surgical History:   Procedure Laterality Date    ABDOMINAL SURGERY      BACK SURGERY      SPINAL FUSION     BACK SURGERY      BLADDER REPAIR      CARDIAC CATHETERIZATION N/A 03/06/2019    Procedure: Valvuloplasty;  Surgeon: Wayne Johnson MD;  Location: [facility] INVASIVE LOCATION;  Service: Cardiology    CARDIAC CATHETERIZATION      CARDIAC CATHETERIZATION Left 5/31/2023    Procedure: Cardiac Catheterization/Vascular Study;  Surgeon: Poncho Reid MD;  Location: [facility] CATH INVASIVE LOCATION;  Service: Cardiovascular;  Laterality: Left;    CARDIAC SURGERY      CARDIAC VALVE REPLACEMENT  2021    CHOLECYSTECTOMY      COLONOSCOPY N/A 12/29/2021    Procedure: COLONOSCOPY;  Surgeon: Karine Orlando MD;  Location: [facility] ENDOSCOPY;  Service: Gastroenterology;  Laterality: N/A;  POOR PREP    COLONOSCOPY N/A 04/13/2022    Procedure: COLONOSCOPY WITH POLYPECTOMY;  Surgeon: Karine Orlando MD;  Location: [facility] ENDOSCOPY;  Service: Gastroenterology;  Laterality: N/A;  COLON POLYP, HEMORRHOIDS, POOR PREP    COLONOSCOPY      DIRECT LARYNGOSCOPY, ESOPHAGOSCOPY, BRONCHOSCOPY N/A 5/22/2023    Procedure: DIRECT LARYNGOSCOPY WITH BIOPSIES , ESOPHAGOSCOPY, BRONCHOSCOPY;  Surgeon: Ronnie Mcgarry MD;  Location: [facility] OR [facility];  Service: ENT;  Laterality: N/A;    ENDOSCOPY N/A 12/29/2021    Procedure: ESOPHAGOGASTRODUODENOSCOPY;  Surgeon: Karine Orlando MD;  Location: [facility] ENDOSCOPY;  Service: Gastroenterology;  Laterality: N/A;  ESOPHAGITIS, GASTRITIS, HIATAL HERNIA    ENDOSCOPY      ENDOSCOPY N/A 4/16/2024    Procedure: ESOPHAGOGASTRODUODENOSCOPY WITH BIOPSIES;  Surgeon: Kraine Orlando MD;  Location: [facility] ENDOSCOPY;  Service: Gastroenterology;  Laterality: N/A;  ESOPHAGITIS, HIATAL HERNIA    ENDOSCOPY W/ PEG TUBE PLACEMENT N/A 12/10/2024    Procedure: ESOPHAGOGASTRODUODENOSCOPY WITH  PERCUTANEOUS ENDOSCOPIC GASTROSTOMY TUBE INSERTION WITH ANESTHESIA;  Surgeon: Rodger Ortega MD;  Location: [facility] ENDOSCOPY;  Service: Gastroenterology;  Laterality: N/A;  PEG TUBE INSERTION    HYSTERECTOMY      LYMPHADENECTOMY      PAIN PUMP INSERTION/REVISION N/A 10/18/2019    Procedure: PAIN PUMP INSERTION [facility] 10-18-19 [place];  Surgeon: Horace Rios MD;  Location: [facility] OR;  Service: Pain Management    PAIN PUMP INSERTION/REVISION N/A 11/23/2020    Procedure: pain pump removal;  Surgeon: Horace Rios MD;  Location: [facility] OR;  Service: Pain Management;  Laterality: N/A;    PEG TUBE INSERTION N/A 7/26/2023    Procedure: PERCUTANEOUS ENDOSCOPIC GASTROSTOMY TUBE INSERTION;  Surgeon: Kody Mercer MD;  Location: [facility] ENDOSCOPY;  Service: General;  Laterality: N/A;  SUCCESSFUL PEG TUBE PLACE PLACEMENT    PORTACATH PLACEMENT      IN LEFT CHEST REPORTS THAT IT IS NOT FUNCTIONING    SKIN BIOPSY      TONSILLECTOMY      TUBAL ABDOMINAL LIGATION      TUMOR REMOVAL       Family History   Problem Relation Age of Onset    Heart failure Mother     Anxiety disorder Mother     Prostate cancer Father     Depression Sister     Bipolar disorder Sister     Pancreatic cancer Sister     ADD / ADHD Brother     Thyroid cancer Maternal Grandmother     Pancreatic cancer Paternal Grandmother     ADD / ADHD Grandson     ADD / ADHD Granddaughter     ADD / ADHD Nephew     Malig Hyperthermia Neg Hx        Home Medications:  Prior to Admission medications    Medication Sig Start Date End Date Taking? Authorizing Provider   acetaminophen (Tylenol) 325 MG tablet Take 1 tablet by mouth Every 6 (Six) Hours As Needed for Mild Pain, Headache or Fever.    Provider, MD Nette   albuterol sulfate  (90 Base) MCG/ACT inhaler Inhale 2 puffs Every 4 (Four) Hours As Needed for Wheezing. 11/20/23   Katia Wright MD   ALPRAZolam (XANAX) 0.25 MG tablet Take 2 tablets by mouth 2 (Two) Times a Day As Needed.  11/25/24   Nette Jiménez MD   famotidine (Pepcid) 40 MG tablet Take 1 tablet by mouth every night at bedtime. 3/27/25   Sobeida Espinosa APRN   Insulin Lispro, 1 Unit Dial, (HUMALOG) 100 UNIT/ML solution pen-injector Inject 12 units daily 2/14/25   Nette Jiménez MD   loperamide (IMODIUM) 2 MG capsule Take 1 capsule by mouth 4 (Four) Times a Day As Needed for Diarrhea (2 with 1st episode of diarrhea and 1 with each additonal episode, max of 8 per day.). 3/27/25   Sobeida Espinosa APRN   mupirocin (BACTROBAN) 2 % ointment Apply 1 Application topically to the appropriate area as directed 3 (Three) Times a Day. 4/10/24   Katia Wright MD   naloxone (NARCAN) 4 MG/0.1ML nasal spray Call 911. Don't prime. [place] in 1 nostril for overdose. Repeat in 2-3 minutes in other nostril if no or minimal breathing/responsiveness. 12/12/24   Ruperto Abdi MD   ondansetron ODT (ZOFRAN-ODT) 8 MG disintegrating tablet  3/27/25   Nette Jiménez MD   pain patient supplied pump by Intrathecal route Continuous. Type of Medication: Hydromorphone 15 mg/ml and Bupivacaine 0.5 mg/ml  [employer] [phone]  Provider:Dr. Boudreaux  Provider number: [phone]  Basal Dose: Hydromorphone 7.518 mg/day Bupivacaine 0.82943 mg/day  Pump capacity: 40ml  ( MAX of Hydromorphone 9.456 mg/ day; Bupivacaine 0.03159 mg /day)  Bolus Dose:Hydromorphone 0.500 mg, Bupivacaine 0.32673 mg  Max activations: 4 per day  Lockout 3 hours. 1 Bolus per every 3 hours   Next refill-  01/07/2025  Alarm date- 01/12/2025  Pump manufacture:MedManny Escobar MD   PARoxetine (PAXIL) 20 MG tablet  3/27/25   Nette Jiménez MD   predniSONE (DELTASONE) 20 MG tablet Take 2 tablets by mouth 2 (Two) Times a Day. 12/23/24   Goyo Nunez MD   Systane 0.4-0.3 % solution ophthalmic solution (artificial tears) Instill 1 TO 2 drops IN each eye 3 TO 4 times daily for dry eyes 1/31/25   Provider, MD Nette        Social History:  "  Social History     Tobacco Use    Smoking status: Every Day     Current packs/day: 1.50     Average packs/day: 1.5 packs/day for 46.2 years (69.4 ttl pk-yrs)     Types: Cigarettes     Start date: 1979     Passive exposure: Current    Smokeless tobacco: Never   Vaping Use    Vaping status: Never Used   Substance Use Topics    Alcohol use: Never    Drug use: Never         Review of Systems:  Review of Systems   Unable to perform ROS: Mental status change        Physical Exam:  /58   Pulse 88   Temp 98.4 °F (36.9 °C) (Oral)   Resp 18   Ht 167.6 cm (66\")   Wt 62.4 kg (137 lb 9.6 oz)   LMP  (LMP Unknown)   SpO2 98%   BMI 22.21 kg/m²     Physical Exam  Vitals and nursing note reviewed.   Constitutional:       General: She is in acute distress.      Appearance: Normal appearance. She is ill-appearing. She is not toxic-appearing.   HENT:      Head: Normocephalic and atraumatic.      Jaw: There is normal jaw occlusion.   Eyes:      General: Lids are normal.      Extraocular Movements: Extraocular movements intact.      Conjunctiva/sclera: Conjunctivae normal.      Pupils: Pupils are equal, round, and reactive to light.   Cardiovascular:      Rate and Rhythm: Normal rate and regular rhythm.      Pulses: Normal pulses.      Heart sounds: Normal heart sounds.   Pulmonary:      Effort: Tachypnea, accessory muscle usage and respiratory distress present.      Breath sounds: Wheezing present. No rhonchi.   Abdominal:      General: Abdomen is flat.      Palpations: Abdomen is soft.      Tenderness: There is no abdominal tenderness. There is no guarding or rebound.   Musculoskeletal:         General: Normal range of motion.      Cervical back: Normal range of motion and neck supple.      Right lower leg: No edema.      Left lower leg: No edema.   Skin:     General: Skin is warm and dry.   Neurological:      Mental Status: Mental status is at baseline.                    Medical Decision Making:      Comorbidities that " affect care:    COPD    External Notes reviewed:    None      The following orders were placed and all results were independently analyzed by me:  Orders Placed This Encounter   Procedures    COVID PRE-OP / PRE-PROCEDURE SCREENING ORDER (NO ISOLATION) - Swab, Nasopharynx    COVID-19, FLU A/B, RSV PCR 1 HR TAT - Swab, Nasopharynx    XR Chest 1 View    [place] Draw    Comprehensive Metabolic Panel    BNP    High Sensitivity Troponin T    CBC Auto Differential    Arterial Blood Gas + Electrolytes    Blood Gas, Arterial -    High Sensitivity Troponin T 1Hr    Diet: Diabetic; Consistent Carbohydrate; Fluid Consistency: Thin (IDDSI 0)    Undress & Gown    Continuous Pulse Oximetry    Vital Signs    Inpatient Hospitalist Consult    Oxygen Therapy- Nasal Cannula; Titrate 1-6 LPM Per SpO2; 90 - 95%    NIPPV (CPAP or BIPAP)    POC Glucose Once    POC Lactate    POC Electrolyte Panel    POC Glucose 4x Daily Before Meals & at Bedtime    ECG 12 Lead ED Triage Standing Order; SOA    Insert Peripheral IV    Inpatient Admission    CBC & Differential    Green Top (Gel)    Lavender Top    Gold Top - SST    Light Blue Top       Medications Given in the Emergency Department:  Medications   sodium chloride 0.9 % flush 10 mL (has no administration in time range)   azithromycin (ZITHROMAX) 500 mg in sodium chloride 0.9 % 250 mL IVPB-VTB (500 mg Intravenous New Bag 3/28/25 1623)   dextrose (GLUTOSE) oral gel 15 g (has no administration in time range)   dextrose (D50W) (25 g/50 mL) IV injection 25 g (has no administration in time range)   glucagon (GLUCAGEN) injection 1 mg (has no administration in time range)   insulin glargine (LANTUS, SEMGLEE) injection 20 Units (has no administration in time range)   Insulin Lispro (humaLOG) injection 2-9 Units (has no administration in time range)   ipratropium-albuterol (DUO-NEB) nebulizer solution 3 mL (3 mL Nebulization Given 3/28/25 1040)   insulin regular (humuLIN R,novoLIN R) injection 7 Units (7  Units Intravenous Given 3/28/25 1614)   ipratropium-albuterol (DUO-NEB) nebulizer solution 3 mL (3 mL Nebulization Given 3/28/25 1604)        ED Course:         Labs:    Lab Results (last 24 hours)       Procedure Component Value Units Date/Time    COVID PRE-OP / PRE-PROCEDURE SCREENING ORDER (NO ISOLATION) - Swab, Nasopharynx [[id number]]  (Normal) Collected: 03/28/25 0928    Specimen: Swab from Nasopharynx Updated: 03/28/25 1012    Narrative:      The following orders were created for panel order COVID PRE-OP / PRE-PROCEDURE SCREENING ORDER (NO ISOLATION) - Swab, Nasopharynx.  Procedure                               Abnormality         Status                     ---------                               -----------         ------                     COVID-19, FLU A/B, RSV P...[[id number]]  Normal              Final result                 Please view results for these tests on the individual orders.    COVID-19, FLU A/B, RSV PCR 1 HR TAT - Swab, Nasopharynx [[id number]]  (Normal) Collected: 03/28/25 0928    Specimen: Swab from Nasopharynx Updated: 03/28/25 1012     COVID19 Not Detected     Influenza A PCR Not Detected     Influenza B PCR Not Detected     RSV, PCR Not Detected    Narrative:      Fact sheet for providers: https://www.fda.gov/media/601660/download    Fact sheet for patients: https://www.fda.gov/media/998245/download    Test performed by PCR.    CBC & Differential [[id number]]  (Abnormal) Collected: 03/28/25 0943    Specimen: Blood from Arm, Left Updated: 03/28/25 0957    Narrative:      The following orders were created for panel order CBC & Differential.  Procedure                               Abnormality         Status                     ---------                               -----------         ------                     CBC Auto Differential[[id number]]        Abnormal            Final result                 Please view results for these tests on the individual orders.    BNP [[id number]]  (Normal)  Collected: 03/28/25 0943    Specimen: Blood from Arm, Left Updated: 03/28/25 1012     proBNP 655.4 pg/mL     Narrative:      This assay is used as an aid in the diagnosis of individuals suspected of having heart failure. It can be used as an aid in the diagnosis of acute decompensated heart failure (ADHF) in patients presenting with signs and symptoms of ADHF to the emergency department (ED). In addition, NT-proBNP of <300 pg/mL indicates ADHF is not likely.    Age Range Result Interpretation  NT-proBNP Concentration (pg/mL:      <50             Positive            >450                   Gray                 300-450                    Negative             <300    50-75           Positive            >900                  Gray                300-900                  Negative            <300      >75             Positive            >1800                  Gray                300-1800                  Negative            <300    CBC Auto Differential [[id number]]  (Abnormal) Collected: 03/28/25 0943    Specimen: Blood from Arm, Left Updated: 03/28/25 0957     WBC 12.01 10*3/mm3      RBC 5.26 10*6/mm3      Hemoglobin 14.2 g/dL      Hematocrit 47.2 %      MCV 89.7 fL      MCH 27.0 pg      MCHC 30.1 g/dL      RDW 14.8 %      RDW-SD 49.1 fl      MPV 10.6 fL      Platelets 165 10*3/mm3      Neutrophil % 88.2 %      Lymphocyte % 5.2 %      Monocyte % 4.7 %      Eosinophil % 0.4 %      Basophil % 0.3 %      Immature Grans % 1.2 %      Neutrophils, Absolute 10.59 10*3/mm3      Lymphocytes, Absolute 0.62 10*3/mm3      Monocytes, Absolute 0.57 10*3/mm3      Eosinophils, Absolute 0.05 10*3/mm3      Basophils, Absolute 0.04 10*3/mm3      Immature Grans, Absolute 0.14 10*3/mm3      nRBC 0.0 /100 WBC     Comprehensive Metabolic Panel [[id number]]  (Abnormal) Collected: 03/28/25 1041    Specimen: Blood from Arm, Left Updated: 03/28/25 1118     Glucose 361 mg/dL      BUN 18 mg/dL      Creatinine 0.53 mg/dL      Sodium 133 mmol/L       Potassium 4.7 mmol/L      Comment: Specimen hemolyzed.  Result may be falsely elevated.        Chloride 94 mmol/L      CO2 30.1 mmol/L      Calcium 8.5 mg/dL      Total Protein 6.7 g/dL      Albumin 3.7 g/dL      ALT (SGPT) 26 U/L      Comment: Specimen hemolyzed.  Result may  be falsely elevated.        AST (SGOT) 33 U/L      Comment: Specimen hemolyzed.  Result may be falsely elevated.        Alkaline Phosphatase 73 U/L      Total Bilirubin 0.3 mg/dL      Globulin 3.0 gm/dL      A/G Ratio 1.2 g/dL      BUN/Creatinine Ratio 34.0     Anion Gap 8.9 mmol/L      eGFR 106.7 mL/min/1.73     Narrative:      GFR Categories in Chronic Kidney Disease (CKD)      GFR Category          GFR (mL/min/1.73)    Interpretation  G1                     90 or greater         Normal or high (1)  G2                      60-89                Mild decrease (1)  G3a                   45-59                Mild to moderate decrease  G3b                   30-44                Moderate to severe decrease  G4                    15-29                Severe decrease  G5                    14 or less           Kidney failure          (1)In the absence of evidence of kidney disease, neither GFR category G1 or G2 fulfill the criteria for CKD.    eGFR calculation 2021 CKD-EPI creatinine equation, which does not include race as a factor    High Sensitivity Troponin T [[id number]]  (Abnormal) Collected: 03/28/25 1041    Specimen: Blood from Arm, Left Updated: 03/28/25 1118     HS Troponin T 34 ng/L      Comment: Specimen hemolyzed.  Results may be falsely decreased.       Narrative:      High Sensitive Troponin T Reference Range:  <14.0 ng/L- Negative Female for AMI  <22.0 ng/L- Negative Male for AMI  >=14 - Abnormal Female indicating possible myocardial injury.  >=22 - Abnormal Male indicating possible myocardial injury.   Clinicians would have to utilize clinical acumen, EKG, Troponin, and serial changes to determine if it is an Acute Myocardial  Infarction or myocardial injury due to an underlying chronic condition.         POC Glucose Once [[id number]]  (Abnormal) Collected: 03/28/25 1100    Specimen: Arterial Blood Updated: 03/28/25 1103     Glucose 409 mg/dL      Comment: Serial Number: [id number]:  [id number]       Blood Gas, Arterial - [[id number]]  (Abnormal) Collected: 03/28/25 1100    Specimen: Arterial Blood Updated: 03/28/25 1103     Site Left Brachial     Shane's Test N/A     pH, Arterial 7.309 pH units      pCO2, Arterial 68.5 mm Hg      pO2, Arterial 87.9 mm Hg      HCO3, Arterial 34.4 mmol/L      Base Excess, Arterial 5.5 mmol/L      Comment: Serial Number: [id number]:  [id number]        O2 Saturation, Arterial 95.3 %      Hemoglobin, Blood Gas 15.1 g/dL      Hematocrit, Blood Gas 44.0 %      Barometric Pressure for Blood Gas 748.4000 mmHg      Modality Cannula     Flow Rate 2.0000 lpm      Notified Who Luis A     Read Back Yes     Notified Time --     Hemodilution No    POC Lactate [[id number]]  (Normal) Collected: 03/28/25 1100    Specimen: Arterial Blood Updated: 03/28/25 1103     Lactate 0.7 mmol/L      Comment: Serial Number: [id number]:  [id number]       POC Electrolyte Panel [[id number]]  (Abnormal) Collected: 03/28/25 1100    Specimen: Arterial Blood Updated: 03/28/25 1103     Sodium 138 mmol/L      POC Potassium 4.2 mmol/L      Chloride 94 mmol/L      Ionized Calcium 1.23 mmol/L      Comment: Serial Number: [id number]:  [id number]                Imaging:    XR Chest 1 View  Result Date: 3/28/2025  XR CHEST 1 VW Date of Exam: 3/28/2025 9:38 AM EDT Indication: SOA Triage Protocol laryngeal malignancy. Comparison: March 18, 2025 Findings: Port catheter is noted with its tip in the superior vena cava. Heart not enlarged. There are prominent markings right infrahilar area which in part could relate to shadows from pulmonary vessels. An infiltrate in the right middle/lower lobe not excluded.  Prosthetic heart valve is noted. There are no pleural  effusions.     Impression: Prominent markings right infrahilar area which in part could relate to shadows from pulmonary vessels. An infiltrate in the right middle/lower lobe not excluded. Electronically Signed: Bear Wright MD  3/28/2025 10:01 AM EDT  Workstation ID: [id number]        Differential Diagnosis and Discussion:    Altered Mental Status: Based on the patient's signs and symptoms, differential diagnosis includes but is not limited to meningitis, stroke, sepsis, subarachnoid hemorrhage, intracranial bleeding, encephalitis, and metabolic encephalopathy.  Dyspnea: Differential diagnosis includes but is not limited to metabolic acidosis, neurological disorders, psychogenic, asthma, pneumothorax, upper airway obstruction, COPD, pneumonia, noncardiogenic pulmonary edema, interstitial lung disease, anemia, congestive heart failure, and pulmonary embolism    PROCEDURES:    Labs were collected in the emergency department and all labs were reviewed and interpreted by me.  X-ray were performed in the emergency department and all X-ray impressions were independently interpreted by me.  An EKG was performed and the EKG was interpreted by me.    ECG 12 Lead ED Triage Standing Order; SOA   Preliminary Result   HEART IQLI=027  bpm   RR Cignqhtz=542  ms   ID Dulealob=212  ms   P Horizontal Axis=9  deg   P Front Axis=73  deg   QRSD Icigatdn=598  ms   QT Qmucachp=418  ms   IHiF=062  ms   QRS Axis=32  deg   T Wave Axis=148  deg   - ABNORMAL ECG -   Sinus tachycardia   Consider right atrial enlargement   Left bundle branch block   Date and Time of Study:2025-03-28 09:37:31        My interpretation of EKG shows sinus tachycardia, tachycardic rate, normal QT, no acute ischemia    Procedures    MDM  Number of Diagnoses or Management Options  Acute exacerbation of chronic obstructive pulmonary disease (COPD)  Acute respiratory failure with hypoxia and hypercapnia  Diagnosis management comments: In summary this is a 59-year-old  female who presents for evaluation of shortness of breath and mental status change in the setting of known COPD.  CBC independently reviewed and interpreted by me and shows no critical abnormalities.  CMP independently reviewed and interpreted by me and shows no critical abnormalities except hyperglycemia without changes of DKA..  ABG independently reviewed interpreted by me reveals mild hypercapnia.  Chest x-ray reviewed by me shows no acute pathology.  Patient was given breathing treatment, antibiotics and placed on BiPAP.  Patient case has been discussed with the hospitalist team who will admit to the hospital for further evaluation and continuation of treatment.             The patient presents with respiratory distress.  The patient does require supplemental oxygen.  Patient was placed on the cardiac monitor after given breathing treatments and being placed on BiPAP.  They were monitored for ventricular ectopy, arrhythmia, tachycardia, hypoxia, and changes in blood pressure.  Continuous pulse oximetry was placed in waveform with corresponding oxygen saturation was assessed throughout their stay in the emergency department.  Patient was rechecked several times in the ED for decline in mental status and worsening distress.    Total Critical Care time of 35 minutes. Total critical care time documented does not include time spent on separately billed procedures for services of nurses or physician assistants. I personally saw and examined the patient. I have reviewed all diagnostic interpretations and treatment plans as written. I was present for the key portions of any procedures performed and the inclusive time noted in any critical care statement. Critical care time includes patient management by me, time spent at the patients bedside,  time to review lab and imaging results, discussing patient care, documentation in the medical record, and time spent with family or caregiver.          Patient Care  Considerations:    CONSULT: I considered consulting pulmonology, however this can be accomplished inpatient      Consultants/Shared Management Plan:    Hospitalist: I have discussed the case with Dr. Bridges who agrees to accept the patient for admission.    Social Determinants of Health:    Patient has presented with family members who are responsible, reliable and will ensure follow up care.      Disposition and Care Coordination:    Admit:   Through independent evaluation of the patient's history, physical, and imperical data, the patient meets criteria for inpatient admission to the hospital.        Final diagnoses:   Acute respiratory failure with hypoxia and hypercapnia   Acute exacerbation of chronic obstructive pulmonary disease (COPD)        ED Disposition       ED Disposition   Decision to Admit    Condition   --    Comment   Level of Care: Remote Telemetry [26]   Diagnosis: Acute on chronic respiratory failure with hypercapnia [0023012]   Admitting Physician: DAYRON BRIDGES [[id number]]   Certification: I Certify That Inpatient Hospital Services Are Medically Necessary For Greater Than 2 Midnights                 This medical record created using voice recognition software.             Sebastian Gunn MD  03/28/25 1708      Electronically signed by Sebastian Gunn MD at 03/28/25 1708       Vital Signs (last day)       Date/Time Temp Temp src Pulse Resp BP Patient Position SpO2    03/29/25 1115 -- -- 105 22 91/63 Lying 100    03/29/25 1026 -- -- 112 -- -- -- --    03/29/25 1022 -- -- 108 24 -- Lying 96    03/29/25 1018 -- -- 107 -- -- -- --    03/29/25 0757 -- -- 120 24 -- Sitting 98    03/29/25 0740 -- -- 116 22 102/64 Lying 93    03/29/25 0341 -- -- 112 22 -- -- 97    03/29/25 0300 97.7 (36.5) Oral 108 24 109/80 Sitting 93    03/29/25 0055 -- -- 115 24 -- -- 97    03/29/25 0045 98 (36.7) Oral 106 20 114/75 Sitting 100    03/28/25 2342 -- -- 91 20 -- -- 91    03/28/25 2241 -- -- 101 16 -- -- 95    03/28/25  "2226 -- -- 100 13 -- -- 97    03/28/25 2145 -- -- 85 20 -- -- 93    03/28/25 2036 -- -- 93 17 -- -- 98    03/28/25 2000 97.3 (36.3) Oral 88 20 98/61 Lying 97    03/28/25 1916 -- -- 95 -- -- -- 100    03/28/25 1851 -- -- 97 -- -- -- 96    03/28/25 1850 -- -- 97 -- -- -- 90    03/28/25 1849 -- -- 97 -- -- -- 90    03/28/25 1815 -- -- 87 -- -- -- 92    03/28/25 1605 -- -- 88 18 -- -- 98    03/28/25 1456 -- -- 86 -- 110/58 -- 96    03/28/25 1455 -- -- 94 -- -- -- 98    03/28/25 1040 -- -- 111 15 -- -- 96    03/28/25 0920 98.4 (36.9) Oral 121 13 106/84 Lying 94          Lab Results (last 24 hours)       Procedure Component Value Units Date/Time    S. Pneumo Ag Urine or CSF - Urine, Urine, Clean Catch [[id number]] Collected: 03/29/25 1121    Specimen: Urine, Clean Catch Updated: 03/29/25 1124    Legionella Antigen, Urine - Urine, Urine, Clean Catch [[id number]] Collected: 03/29/25 1121    Specimen: Urine, Clean Catch Updated: 03/29/25 1124    POC Glucose Once [[id number]]  (Abnormal) Collected: 03/29/25 1004    Specimen: Blood Updated: 03/29/25 1046     Glucose 222 mg/dL      Comment: Serial Number: [id number]:  [id number]       Procalcitonin [[id number]]  (Normal) Collected: 03/29/25 0840    Specimen: Blood from Arm, Right Updated: 03/29/25 0923     Procalcitonin 0.07 ng/mL     Narrative:      As a Marker for Sepsis (Non-Neonates):    1. <0.5 ng/mL represents a low risk of severe sepsis and/or septic shock.  2. >2 ng/mL represents a high risk of severe sepsis and/or septic shock.    As a Marker for Lower Respiratory Tract Infections that require antibiotic therapy:    PCT on Admission    Antibiotic Therapy       6-12 Hrs later    >0.5                Strongly Recommended  >0.25 - <0.5        Recommended  0.1 - 0.25          Discouraged              Remeasure/reassess PCT  <0.1                Strongly Discouraged     Remeasure/reassess PCT    As 28 day mortality risk marker: \"Change in Procalcitonin Result\" (>80% or " <=80%) if Day 0 (or Day 1) and Day 4 values are available. Refer to http://www.[facility]-pct-calculator.com    Change in PCT <=80%  A decrease of PCT levels below or equal to 80% defines a positive change in PCT test result representing a higher risk for 28-day all-cause mortality of patients diagnosed with severe sepsis for septic shock.    Change in PCT >80%  A decrease of PCT levels of more than 80% defines a negative change in PCT result representing a lower risk for 28-day all-cause mortality of patients diagnosed with severe sepsis or septic shock.    This test is Prognostic not Diagnostic, if elevated correlate with clinical findings before administering antibiotic treatment.        Blood Culture - Blood, Hand, Right [[id number]] Collected: 03/29/25 0840    Specimen: Blood from Hand, Right Updated: 03/29/25 0846    Blood Culture - Blood, Arm, Right [[id number]] Collected: 03/29/25 0840    Specimen: Blood from Arm, Right Updated: 03/29/25 0846    Basic Metabolic Panel [[id number]]  (Abnormal) Collected: 03/29/25 0512    Specimen: Blood from Arm, Left Updated: 03/29/25 0606     Glucose 265 mg/dL      BUN 13 mg/dL      Creatinine 0.45 mg/dL      Sodium 138 mmol/L      Potassium 4.7 mmol/L      Chloride 96 mmol/L      CO2 33.1 mmol/L      Calcium 8.3 mg/dL      BUN/Creatinine Ratio 28.9     Anion Gap 8.9 mmol/L      eGFR 111.0 mL/min/1.73     Narrative:      GFR Categories in Chronic Kidney Disease (CKD)      GFR Category          GFR (mL/min/1.73)    Interpretation  G1                     90 or greater         Normal or high (1)  G2                      60-89                Mild decrease (1)  G3a                   45-59                Mild to moderate decrease  G3b                   30-44                Moderate to severe decrease  G4                    15-29                Severe decrease  G5                    14 or less           Kidney failure          (1)In the absence of evidence of kidney disease, neither GFR  category G1 or G2 fulfill the criteria for CKD.    eGFR calculation 2021 CKD-EPI creatinine equation, which does not include race as a factor    CBC (No Diff) [[id number]]  (Abnormal) Collected: 03/29/25 0512    Specimen: Blood from Arm, Left Updated: 03/29/25 0531     WBC 8.58 10*3/mm3      RBC 4.87 10*6/mm3      Hemoglobin 13.0 g/dL      Hematocrit 44.0 %      MCV 90.3 fL      MCH 26.7 pg      MCHC 29.5 g/dL      RDW 14.6 %      RDW-SD 48.8 fl      MPV 10.6 fL      Platelets 145 10*3/mm3     POC Glucose Once [[id number]]  (Abnormal) Collected: 03/28/25 2218    Specimen: Blood Updated: 03/28/25 2257     Glucose 137 mg/dL      Comment: Serial Number: [id number]:  [id number]       POC Glucose Once [[id number]]  (Abnormal) Collected: 03/28/25 2239    Specimen: Arterial Blood Updated: 03/28/25 2242     Glucose 151 mg/dL      Comment: Serial Number: [id number]:  [id number]       Blood Gas, Arterial - [[id number]]  (Abnormal) Collected: 03/28/25 2239    Specimen: Arterial Blood Updated: 03/28/25 2242     Site Right Radial     Shane's Test Positive     pH, Arterial 7.364 pH units      pCO2, Arterial 67.8 mm Hg      pO2, Arterial 59.2 mm Hg      HCO3, Arterial 38.7 mmol/L      Base Excess, Arterial 10.3 mmol/L      Comment: Serial Number: [id number]:  [id number]        O2 Saturation, Arterial 88.0 %      Hemoglobin, Blood Gas 14.4 g/dL      Hematocrit, Blood Gas 42.0 %      Barometric Pressure for Blood Gas 744.3000 mmHg      Modality Cannula     FIO2 28 %      Flow Rate 2.0000 lpm      Notified Who Xochitl SCOTT RN     Read Back Yes     Notified Time --     Hemodilution No     PO2/FIO2 211    POC Lactate [[id number]]  (Normal) Collected: 03/28/25 2239    Specimen: Arterial Blood Updated: 03/28/25 2242     Lactate 0.8 mmol/L      Comment: Serial Number: [id number]:  [id number]       POC Electrolyte Panel [[id number]]  (Abnormal) Collected: 03/28/25 2239    Specimen: Arterial Blood Updated: 03/28/25 2242     Sodium 142 mmol/L       POC Potassium 4.0 mmol/L      Chloride 95 mmol/L      Ionized Calcium 1.29 mmol/L      Comment: Serial Number: [id number]:  [id number]       High Sensitivity Troponin T 1Hr [[id number]]  (Abnormal) Collected: 03/28/25 1823    Specimen: Blood from Arm, Left Updated: 03/28/25 1853     HS Troponin T 25 ng/L      Troponin T Numeric Delta -9 ng/L      Troponin T % Delta -26    Narrative:      High Sensitive Troponin T Reference Range:  <14.0 ng/L- Negative Female for AMI  <22.0 ng/L- Negative Male for AMI  >=14 - Abnormal Female indicating possible myocardial injury.  >=22 - Abnormal Male indicating possible myocardial injury.   Clinicians would have to utilize clinical acumen, EKG, Troponin, and serial changes to determine if it is an Acute Myocardial Infarction or myocardial injury due to an underlying chronic condition.         POC Glucose Once [[id number]]  (Abnormal) Collected: 03/28/25 1617    Specimen: Blood Updated: 03/28/25 1725     Glucose 284 mg/dL      Comment: Serial Number: [id number]:  [id number]             Imaging Results (Last 24 Hours)       ** No results found for the last 24 hours. **          ECG/EMG Results (last 24 hours)       Procedure Component Value Units Date/Time    ECG 12 Lead ED Triage Standing Order; SOA [[id number]] Collected: 03/28/25 0937     Updated: 03/28/25 2031     QT Interval 333 ms      QTC Interval 467 ms     Narrative:      HEART IUUE=572  bpm  RR Suhzneua=468  ms  IA Tsoolydj=927  ms  P Horizontal Axis=9  deg  P Front Axis=73  deg  QRSD Jthvvkpy=983  ms  QT Raicztid=539  ms  QYeL=235  ms  QRS Axis=32  deg  T Wave Axis=148  deg  - ABNORMAL ECG -  Sinus tachycardia  Consider  right atrial enlargement  Left bundle branch block  When compared with ECG of 08-Feb-2025 06:04:08,  Significant repolarization change  Electronically Signed By: Manuelito Mcclelland ([facility]) 2025-03-28 20:31:25  Date and Time of Study:2025-03-28 09:37:31          Physician Progress Notes (last 24 hours)   Notes from 03/28/25 1138 through 03/29/25 1138   No notes of this type exist for this encounter.       Consult Notes (last 24 hours)  Notes from 03/28/25 1138 through 03/29/25 1138   No notes of this type exist for this encounter.

## 2025-03-29 NOTE — PLAN OF CARE
Goal Outcome Evaluation:              Outcome Evaluation: Patient sitting on side of bed. Bipap is at bedside on standby, patient does wear on/off. Patient was wearing 4l nasal cannula, titrated down to 2L

## 2025-03-29 NOTE — CONSULTS
Pulmonary / Critical Care Consult Note      Patient Name: Isha Llanes  : 1965  MRN: [id number]  Primary Care Physician:  Virgil Salas MD  Referring Physician: Víctor Draper MD  Date of admission: 3/28/2025    Subjective   Subjective     Reason for Consult/ Chief Complaint:   Acute hypoxic and hypercapnic respiratory failure    HPI:  Isha Llanes is a 59 y.o. 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  In the ED, ABG was 7.3 .  All other labs were unremarkable.  CXR demonstrating concern for pulmonary edema versus pneumonia.  Service was consulted to assist in management treatment of patient's condition.  Upon assessment, patient lying in bed on 2 L nasal cannula and apparent distress.  Fines and plan were discussed with the patient.  Patient stating that she is not coughing up anything and does not feel short of breath.  Patient just reporting that she is having some back pain and needs her pain medicine.  Patient denies any fever, chills, nausea, vomiting, or being around any sick contacts    Personal History     Past Medical History:   Diagnosis Date    Anesthesia     REPORTS HAS GOT REAL EMOTIONAL AND HAS BECOME ANGRY BEFORE    Anxiety     Aortic stenosis, severe     HAS BIO VALVE REPLACED    Arthritis     Asthma     Back pain     Blood clotting disorder     Cancer     Cancer associated pain     HODGKINS LYMPHOMA    CHF (congestive heart failure)     Chronic low back pain with sciatica     Chronic pain disorder     COPD (chronic obstructive pulmonary disease)     Coronary artery disease     Diabetes mellitus     TYPE 2    Diabetes mellitus     Dyspnea on effort     GERD (gastroesophageal reflux disease)     H/O lumpectomy     H/O: hysterectomy     Hiatal hernia     History of  degenerative disc disease     History of kidney stones     History of pulmonary embolus (PE)     History of transfusion     AS A CHILD NO TRANSFUSION REACTION    History of transfusion     Hodgkin's lymphoma     5/19/23  5 YEARS SINCE LAST CHEMO TX    Hypertension     Immobility     Liver disease     DENIES ANY CURRENT ISSUES    Lupus     Mitral valve prolapse     Nausea vomiting and diarrhea 3/7/2024    Panic disorder     Pelvic pain     Peripheral neuropathy     Personal history of DVT (deep vein thrombosis)     Presence of intrathecal pump     PTSD (post-traumatic stress disorder)     Sacroiliac joint disease     SI (sacroiliac) joint inflammation     Sleep apnea     Venous insufficiency        Past Surgical History:   Procedure Laterality Date    ABDOMINAL SURGERY      BACK SURGERY      SPINAL FUSION     BACK SURGERY      BLADDER REPAIR      CARDIAC CATHETERIZATION N/A 03/06/2019    Procedure: Valvuloplasty;  Surgeon: Wayne Johnson MD;  Location: [facility] CATH INVASIVE LOCATION;  Service: Cardiology    CARDIAC CATHETERIZATION      CARDIAC CATHETERIZATION Left 5/31/2023    Procedure: Cardiac Catheterization/Vascular Study;  Surgeon: Poncho Reid MD;  Location: [facility] CATH INVASIVE LOCATION;  Service: Cardiovascular;  Laterality: Left;    CARDIAC SURGERY      CARDIAC VALVE REPLACEMENT  2021    CHOLECYSTECTOMY      COLONOSCOPY N/A 12/29/2021    Procedure: COLONOSCOPY;  Surgeon: Karine Orlando MD;  Location: [facility] ENDOSCOPY;  Service: Gastroenterology;  Laterality: N/A;  POOR PREP    COLONOSCOPY N/A 04/13/2022    Procedure: COLONOSCOPY WITH POLYPECTOMY;  Surgeon: Karine Orlando MD;  Location: [facility] ENDOSCOPY;  Service: Gastroenterology;  Laterality: N/A;  COLON POLYP, HEMORRHOIDS, POOR PREP    COLONOSCOPY      DIRECT LARYNGOSCOPY, ESOPHAGOSCOPY, BRONCHOSCOPY N/A 5/22/2023    Procedure: DIRECT LARYNGOSCOPY WITH BIOPSIES , ESOPHAGOSCOPY, BRONCHOSCOPY;  Surgeon: Ronnie Mcgarry  MD;  Location: [facility] OR [facility];  Service: ENT;  Laterality: N/A;    ENDOSCOPY N/A 12/29/2021    Procedure: ESOPHAGOGASTRODUODENOSCOPY;  Surgeon: Karine Orlando MD;  Location: [facility] ENDOSCOPY;  Service: Gastroenterology;  Laterality: N/A;  ESOPHAGITIS, GASTRITIS, HIATAL HERNIA    ENDOSCOPY      ENDOSCOPY N/A 4/16/2024    Procedure: ESOPHAGOGASTRODUODENOSCOPY WITH BIOPSIES;  Surgeon: Karine Orlando MD;  Location: [facility] ENDOSCOPY;  Service: Gastroenterology;  Laterality: N/A;  ESOPHAGITIS, HIATAL HERNIA    ENDOSCOPY W/ PEG TUBE PLACEMENT N/A 12/10/2024    Procedure: ESOPHAGOGASTRODUODENOSCOPY WITH PERCUTANEOUS ENDOSCOPIC GASTROSTOMY TUBE INSERTION WITH ANESTHESIA;  Surgeon: Rodger Ortega MD;  Location: [facility] ENDOSCOPY;  Service: Gastroenterology;  Laterality: N/A;  PEG TUBE INSERTION    HYSTERECTOMY      LYMPHADENECTOMY      PAIN PUMP INSERTION/REVISION N/A 10/18/2019    Procedure: PAIN PUMP INSERTION \[facility]\"" 10-18-19 [place];  Surgeon: Horace Rios MD;  Location: [facility];  Service: Pain Management    PAIN PUMP INSERTION/REVISION N/A 11/23/2020    Procedure: pain pump removal;  Surgeon: Horace Rios MD;  Location: [facility];  Service: Pain Management;  Laterality: N/A;    PEG TUBE INSERTION N/A 7/26/2023    Procedure: PERCUTANEOUS ENDOSCOPIC GASTROSTOMY TUBE INSERTION;  Surgeon: Kody Mercer MD;  Location: [facility] ENDOSCOPY;  Service: General;  Laterality: N/A;  SUCCESSFUL PEG TUBE PLACE PLACEMENT    PORTACATH PLACEMENT      IN LEFT CHEST REPORTS THAT IT IS NOT FUNCTIONING    SKIN BIOPSY      TONSILLECTOMY      TUBAL ABDOMINAL LIGATION      TUMOR REMOVAL         Family History: family history includes ADD / ADHD in her brother, granddaughter, grandson, and nephew; Anxiety disorder in her mother; Bipolar disorder in her sister; Depression in her sister; Heart failure in her mother; Pancreatic cancer in her paternal grandmother and sister; Prostate cancer in her  father; Thyroid cancer in her maternal grandmother. Otherwise pertinent FHx was reviewed and not pertinent to current issue.    Social History:  reports that she has been smoking cigarettes. She started smoking about 46 years ago. She has a 69.4 pack-year smoking history. She has been exposed to tobacco smoke. She has never used smokeless tobacco. She reports that she does not drink alcohol and does not use drugs.    Home Medications:  ALPRAZolam, Insulin Lispro (1 Unit Dial), PARoxetine, acetaminophen, albuterol sulfate HFA, famotidine, furosemide, loperamide, mupirocin, naloxone, ondansetron ODT, pain, polyethyl glycol-propyl glycol, and predniSONE    Allergies:  Allergies   Allergen Reactions    Amoxicillin Shortness Of Breath     Has tolerated Cefazolin, Ceftriaxone, and Cefepime -Jermaine Melida, RPH    Ceclor [Cefaclor] Shortness Of Breath     Has tolerated Cefazolin, Ceftriaxone, and Cefepime -Jermaine Melida, [facility]      Penicillins Shortness Of Breath     Has tolerated Cefazolin, Ceftriaxone, and Cefepime -[facility]    Sulfa Antibiotics Rash    Valium [Diazepam] Mental Status Change     DEPRESSED    Ambien [Zolpidem] Mental Status Change    Aspirin GI Intolerance    Ativan [Lorazepam] Mental Status Change    Benadryl [Diphenhydramine] Anxiety     AND MAKES HER HYPER    Biaxin [Clarithromycin] Unknown - Low Severity    Cephalexin Unknown - Low Severity    Clindamycin Unknown - Low Severity    Compazine [Prochlorperazine Edisylate] Rash    Contrast Dye (Echo Or Unknown Ct/Mr) Other (See Comments)     Caused pain in her arm    Doxycycline Rash    Nsaids GI Intolerance    Phenergan [Promethazine Hcl] GI Intolerance    Promethazine GI Intolerance    Vancomycin Itching       Objective    Objective     Vitals:   Temp:  [97.3 °F (36.3 °C)-98 °F (36.7 °C)] 97.7 °F (36.5 °C)  Heart Rate:  [] 105  Resp:  [13-24] 22  BP: ()/(61-80) 91/63  Flow (L/min) (Oxygen Therapy):  [2-4] 2    Physical  Exam:  Vital Signs Reviewed   General:  WDWN, Alert, NAD.  Chronically ill-appearing female, lying in bed  HEENT:  PERRL, EOMI.  OP, nares clear, no sinus tenderness  Neck:  Supple, no JVD, no thyromegaly  Lymph: no axillary, cervical, supraclavicular lymphadenopathy noted bilaterally  Chest:  good aeration, diminished breath sounds with scattered rhonchi, no work of breathing on 2 L [place]  CV: RRR, no MGR, pulses 2+, equal.  Abd:  Soft, NT, ND, + BS, no HSM, PEG tube  EXT:  no clubbing, no cyanosis, no edema, no joint tenderness  Neuro:  A&Ox3, CN grossly intact, no focal deficits.  Skin: No rashes or lesions noted      Result Review    Result Review:  I have personally reviewed the results from the time of this admission to 3/29/2025 15:16 EDT and agree with these findings:  [x]  Laboratory  [x]  Microbiology  [x]  Radiology  [x]  EKG/Telemetry   [x]  Cardiology/Vascular   []  Pathology  []  Old records  []  Other:  Most notable findings include:         Lab 03/29/25  0512 03/28/25  1041 03/28/25  0943   WBC 8.58  --  12.01*   HEMOGLOBIN 13.0  --  14.2   HEMATOCRIT 44.0  --  47.2*   PLATELETS 145  --  165   SODIUM 138 133*  --    POTASSIUM 4.7 4.7  --    CHLORIDE 96* 94*  --    CO2 33.1* 30.1*  --    BUN 13 18  --    CREATININE 0.45* 0.53*  --    GLUCOSE 265* 361*  --    CALCIUM 8.3* 8.5*  --    TOTAL PROTEIN  --  6.7  --    ALBUMIN  --  3.7  --    GLOBULIN  --  3.0  --      Results for orders placed during the hospital encounter of 05/26/23    Adult Transthoracic Echo Complete w/ Color, Spectral and Contrast if necessary per protocol    Interpretation Summary    Left ventricular ejection fraction appears to be 46 - 50%.    Left ventricular wall thickness is consistent with mild concentric hypertrophy.    Left ventricular diastolic function was indeterminate.    There is a TAVR valve present.    Moderate to severe tricuspid valve regurgitation is present.    Estimated right ventricular systolic pressure from  tricuspid regurgitation is mildly elevated (35-45 mmHg).    There were no apparent intracardiac masses, vegetations or thrombi.    Chest x-ray personally showing chronic pulmonary scarring  ABG with chronic compensated respiratory failure      Assessment & Plan   Assessment / Plan     Active Hospital Problems:  Active Hospital Problems    Diagnosis     **Acute on chronic respiratory failure with hypoxia     Acute on chronic respiratory failure with hypercapnia     Intractable nausea and vomiting     Generalized weakness     AMS (altered mental status)     History of laryngeal cancer     Malignant neoplasm of supraglottis     Hodgkin lymphoma     Tobacco abuse     Throat pain     Laryngeal cancer     GERD (gastroesophageal reflux disease)     Depression     CAD (coronary artery disease)     Diabetes mellitus, type 2     CHF (congestive heart failure)     Acute exacerbation of chronic obstructive pulmonary disease (COPD)     Chronic pain syndrome      Impression:  Acute hypoxemic and hypercapnic respiratory failure  Community-acquired pneumonia from unspecified organism  Acute exacerbation of COPD  Acute cardiogenic pulmonary edema  Acute decompensated diastolic congestive heart failure  History of calcified right lower lobe granuloma.  Question new right lower lobe lung nodule  Squamous cell carcinoma of the larynx status post radiation and history of Hodgkin's and Phoma  History of severe AS status post TAVR  History of MSSA cellulitis    Plan:  Wean O2 to keep SPO2 greater than 90%  BG appears to have compensated chronic respiratory failure.  Continue NIPPV nightly with naps on current settings  Check noncontrast chest CT  Check echocardiogram  Hold oral Lasix and start Lasix 40 mg IV twice daily  Trend renal panel and electrolytes  Continue cefepime and azithromycin for now.  Panculture patient, check procalcitonin and MRSA PCR  Check viral panel  Continue Brovana, Pulmicort, DuoNebs  Continue Solu-Medrol 40 mg  daily.  Thank you 70 steroids.  Day 2  Pain control per primary  Limit all sedating medications as she is very lethargic  PT/OT/speech therapy evaluate    VTE Prophylaxis:  Pharmacologic VTE prophylaxis orders are present.    Code Status and Medical Interventions: CPR (Attempt to Resuscitate); Full Support   Ordered at: 03/29/25 1431     Code Status (Patient has no pulse and is not breathing):    CPR (Attempt to Resuscitate)     Medical Interventions (Patient has pulse or is breathing):    Full Support     Level Of Support Discussed With:    Patient        Labs, imaging, notes and medications personally reviewed.  Discussed with primary    I, Dr. Enzo Segal, have spent more than 50% of the total time managing the patient in this encounter today.  This included personally reviewing all pertinent labs, imaging, microbiology and documentation. Also discussing the case with the patient and any available family, the admitting physician and any available ancillary staff.    Thank you for involving me in the care of this patient.    Electronically signed by LUZ Knight, 03/29/25, 9:27 AM EDT.  Electronically signed by Enzo Segal MD, 03/29/25, 3:16 PM EDT.

## 2025-03-29 NOTE — PROGRESS NOTES
[facility]   Hospitalist Progress Note  Date: 3/29/2025  Patient Name: Isha Llanes  : 1965  MRN: [id number]  Date of admission: 3/28/2025      Subjective   Subjective     Chief Complaint: Hospital status, shortness of breath    Summary: 59 y.o. female with past medical history of diabetes, hypertension, laryngeal cancer status post feeding tube placement, Hodgkin's lymphoma, squamous cell carcinoma, dysphagia, CHF, COPD, lupus, ABY, and GERD presented to the ED for evaluation of increased confusion, lethargy, and shortness of breath.  Patient was very nauseous and confused when seen so history is given by daughter at bedside.  Per daughter patient has not been doing well the last few days with persistent nausea, vomiting, and abdominal discomfort.  Since this morning patient has been short of breath, lethargic and and confused.  Of note patient also has dysphagia but continues to eat and drink p.o.  Due to her worsening confusion patient was brought to the ED for further evaluation.  In the ED patient was tachycardic and hypoxic on arrival requiring oxygen supplementation via nasal cannula and eventually BiPAP.  ABG showed that he was in hypercapnic respiratory failure with acidosis and labs showed leukocytosis, hyperglycemia, electrolyte imbalances, and elevated BUN/creatinine ratio.  Chest x-ray showed prominent finding in the infrahilar area which could be possible infiltrate.  She was admitted for further care, broad-spectrum antibiotics were initiated.  Pulmonology was consulted, patient was kept on continuous NIPPV.    Interval Followup: Patient remains lethargic, not answering questions appropriately.  Opens eyes to voice but falls asleep quickly.  Whenever she is awake, she repeatedly asks for pain and anxiety medications.     Objective   Objective     Vitals:   Temp:  [97.3 °F (36.3 °C)-98 °F (36.7 °C)] 97.7 °F (36.5 °C)  Heart Rate:  [] 105  Resp:  [13-24] 22  BP:  ()/(58-80) 91/63  Flow (L/min) (Oxygen Therapy):  [2-4] 2  Physical Exam    Constitutional: Ill-appearing female, lethargic, confused   Respiratory: Diminished breath sounds in bilateral bases, wheezing noted throughout all lung fields, nonlabored respirations    Cardiovascular: Tachycardic but regular, no MRG   Gastrointestinal: Positive bowel sounds, soft, nondistended   Neurologic: Oriented x 1, moving all extremities spontaneously, lethargic, awakens to voice but falls asleep quickly    Result Review    I have personally reviewed the results below:  [x]  Laboratory personally reviewed BMP, CBC, magnesium, blood sugars, procalcitonin  []  Microbiology  []  Radiology  []  EKG/Telemetry   []  Cardiology/Vascular   []  Pathology  []  Old records  []  Other:  CBC          3/18/2025    05:33 3/28/2025    09:43 3/29/2025    05:12   CBC   WBC 9.02  12.01  8.58    RBC 4.50  5.26  4.87    Hemoglobin 12.2  14.2  13.0    Hematocrit 41.1  47.2  44.0    MCV 91.3  89.7  90.3    MCH 27.1  27.0  26.7    MCHC 29.7  30.1  29.5    RDW 15.0  14.8  14.6    Platelets 179  165  145      CMP          3/18/2025    01:37 3/18/2025    05:33 3/28/2025    10:41 3/29/2025    05:12   CMP   Glucose  259  361  265    BUN  14  18  13    Creatinine <0.30  0.38  0.53  0.45    EGFR  115.6  106.7  111.0    Sodium  137  133  138    Potassium  4.4  4.7  4.7    Chloride  101  94  96    Calcium  8.4  8.5  8.3    Total Protein   6.7     Albumin   3.7     Globulin   3.0     Total Bilirubin   0.3     Alkaline Phosphatase   73     AST (SGOT)   33     ALT (SGPT)   26     Albumin/Globulin Ratio   1.2     BUN/Creatinine Ratio  36.8  34.0  28.9    Anion Gap  7.5  8.9  8.9        Assessment & Plan   Assessment / Plan   Acute on chronic hypercapnic respiratory failure with acute exacerbation  Hodgkin's lymphoma  Laryngeal cancer  Diabetes mellitus  Hypertension  Questionable lower extremity cellulitis  COPD  ABY  History of lupus  Aortic stenosis status post  TAVR    Continue to monitor in the hospital for workup and management of the above  Consult pulmonology, appreciate assistance  Obtain CT chest  Start broad-spectrum cefepime due to concern for pneumonia on chest x-ray  Continue with IV Solu-Medrol 40 mg daily  Obtain respiratory viral panel  Continue BiPAP nightly and with naps  Caution with sedating medications, decrease Dilaudid to 0.25 mg every 4 hours as needed for severe pain, monitor vital signs closely while IV narcotics  Transition IV Ativan to oral Xanax 0.25 mg 3 times daily as needed per her home dose  Obtain repeat 2D echo  Obtain blood culture, sputum culture, MRSA nares  Start scheduled DuoNebs, Pulmicort and Brovana twice daily, albuterol as needed  Wean O2 as tolerated keep sats greater than 90%  Continue nicotine patch  Continue Lantus 20 units daily, sliding scale insulin  Trend renal function and electrolytes with a.m. BMP, magnesium   Trend Hgb and WBC with a.m. CBC     Discussed plan with RN, pulmonology    VTE Prophylaxis:  Pharmacologic VTE prophylaxis orders are present.        CODE STATUS:   Code Status (Patient has no pulse and is not breathing): CPR (Attempt to Resuscitate)  Medical Interventions (Patient has pulse or is breathing): Full Support  Level Of Support Discussed With: Patient

## 2025-03-29 NOTE — PLAN OF CARE
Goal Outcome Evaluation:         Patient continues to take the BiPAP mask off herself. She has been wearing the mask for about 30 minutes and she will remove it. Her settings are 14/7 and 28%. An ABG was obtained at 2230 and she was compensated at pH- 7.38/CO2- 67.8/PO2- 59.2/HCO3-38.7.

## 2025-03-29 NOTE — PLAN OF CARE
Goal Outcome Evaluation:           Progress: no change  Outcome Evaluation: Pt remains A&Ox4. VSS. Maintaining SpO2 >90 on 2.5L NC. Pt frequently requesting pain and anxiety medication when awake but continues to nod off frequently. Provider requested pt be put on bipap but pt refused. Chest CT ordered but pt was unable to remain still still for imaging. Decreased PO intake this shift secondary to pt's somnolence. ABX continued. 1L NS bolus given. Continuing plan of care at this time.

## 2025-03-29 NOTE — THERAPY EVALUATION
RT EQUIPMENT DEVICE RELATED - SKIN ASSESSMENT    RT Medical Equipment/Device:     NIV Mask:  Under-the-nose   size:  b    Skin Assessment:      Cheek:  Intact  Chin:  Intact  Nares:  Intact  Neck:  Intact  Mouth:  Intact    Device Skin Pressure Protection:  Pressure points protected    Nurse Notification:  Zayda Juan, RRT

## 2025-03-29 NOTE — THERAPY EVALUATION
Respiratory Therapist Broncho-Pulmonary Hygiene Progress Note      Patient Name:  Isha Llanes  YOB: 1965    Isha Llanes meets the qualification for Level 2 of the Bronco-Pulmonary Hygiene Protocol. This was based on my daily patient assessment and includes review of chest x-ray results, cough ability and quality, oxygenation, secretions or risk for secretion development and patient mobility.     Broncho-Pulmonary Hygiene Assessment:    Level of Movement: Actively changing positions-requires assistance  Disoriented/Follows Commands    Breath Sounds: Clear to slightly diminished    Cough: Ineffective, weak or not cough efforts    Chest X-Ray: Mild consolidation and/or atelectasis.    Sputum Productions: None or small amount of thin or watery secretions with effective cough    History and Physical: Chronic condition    SpO2 to Oxygen Need: greater than 92% on room air or  less than 3L nasal canula    Current SpO2 is: 96 on 2L    Based on this information, I have completed the following interventions: Teach/Instruct patient on cough and deep breathe and Aerobika with bronchodialtor medication or TID      Electronically signed by Taylor Juan RRT, 03/29/25, 10:26 AM EDT.

## 2025-03-30 ENCOUNTER — APPOINTMENT (OUTPATIENT)
Dept: CARDIOLOGY | Facility: HOSPITAL | Age: 60
DRG: 189 | End: 2025-03-30
Payer: COMMERCIAL

## 2025-03-30 VITALS
HEIGHT: 66 IN | SYSTOLIC BLOOD PRESSURE: 122 MMHG | OXYGEN SATURATION: 97 % | WEIGHT: 145.72 LBS | DIASTOLIC BLOOD PRESSURE: 81 MMHG | RESPIRATION RATE: 20 BRPM | BODY MASS INDEX: 23.42 KG/M2 | TEMPERATURE: 97.7 F | HEART RATE: 114 BPM

## 2025-03-30 LAB
AORTIC DIMENSIONLESS INDEX: 0.31 (DI)
AV MEAN PRESS GRAD SYS DOP V1V2: 18 MMHG
AV VMAX SYS DOP: 269 CM/SEC
BH CV ECHO MEAS - AO MAX PG: 28.9 MMHG
BH CV ECHO MEAS - AO ROOT DIAM: 3.1 CM
BH CV ECHO MEAS - AO V2 VTI: 50.1 CM
BH CV ECHO MEAS - AVA(I,D): 0.79 CM2
BH CV ECHO MEAS - EDV(CUBED): 39.3 ML
BH CV ECHO MEAS - EDV(MOD-SP2): 18.8 ML
BH CV ECHO MEAS - EDV(MOD-SP4): 32.8 ML
BH CV ECHO MEAS - EF(MOD-SP2): 59.8 %
BH CV ECHO MEAS - EF(MOD-SP4): 64.9 %
BH CV ECHO MEAS - ESV(CUBED): 12.2 ML
BH CV ECHO MEAS - ESV(MOD-SP2): 7.6 ML
BH CV ECHO MEAS - ESV(MOD-SP4): 11.5 ML
BH CV ECHO MEAS - FS: 32.4 %
BH CV ECHO MEAS - IVS/LVPW: 1.36 CM
BH CV ECHO MEAS - IVSD: 1.5 CM
BH CV ECHO MEAS - LA DIMENSION: 4.5 CM
BH CV ECHO MEAS - LAT PEAK E' VEL: 12.5 CM/SEC
BH CV ECHO MEAS - LV DIASTOLIC VOL/BSA (35-75): 18.8 CM2
BH CV ECHO MEAS - LV MASS(C)D: 147.6 GRAMS
BH CV ECHO MEAS - LV MAX PG: 3.6 MMHG
BH CV ECHO MEAS - LV MEAN PG: 2 MMHG
BH CV ECHO MEAS - LV SYSTOLIC VOL/BSA (12-30): 6.6 CM2
BH CV ECHO MEAS - LV V1 MAX: 94.8 CM/SEC
BH CV ECHO MEAS - LV V1 VTI: 15.6 CM
BH CV ECHO MEAS - LVIDD: 3.4 CM
BH CV ECHO MEAS - LVIDS: 2.3 CM
BH CV ECHO MEAS - LVOT AREA: 2.5 CM2
BH CV ECHO MEAS - LVOT DIAM: 1.8 CM
BH CV ECHO MEAS - LVPWD: 1.1 CM
BH CV ECHO MEAS - MED PEAK E' VEL: 4.8 CM/SEC
BH CV ECHO MEAS - MV A MAX VEL: 211 CM/SEC
BH CV ECHO MEAS - MV DEC SLOPE: 777 CM/SEC2
BH CV ECHO MEAS - MV DEC TIME: 0.19 SEC
BH CV ECHO MEAS - MV E MAX VEL: 145 CM/SEC
BH CV ECHO MEAS - MV E/A: 0.69
BH CV ECHO MEAS - MV MEAN PG: 10 MMHG
BH CV ECHO MEAS - MV V2 VTI: 40.6 CM
BH CV ECHO MEAS - MVA(VTI): 0.98 CM2
BH CV ECHO MEAS - SV(LVOT): 39.7 ML
BH CV ECHO MEAS - SV(MOD-SP2): 11.3 ML
BH CV ECHO MEAS - SV(MOD-SP4): 21.3 ML
BH CV ECHO MEAS - SVI(LVOT): 22.8 ML/M2
BH CV ECHO MEAS - SVI(MOD-SP2): 6.4 ML/M2
BH CV ECHO MEAS - SVI(MOD-SP4): 12.2 ML/M2
BH CV ECHO MEAS - TAPSE (>1.6): 1.8 CM
BH CV ECHO MEAS - TR MAX PG: 32.3 MMHG
BH CV ECHO MEAS - TR MAX VEL: 284 CM/SEC
BH CV ECHO MEASUREMENTS AVERAGE E/E' RATIO: 16.76
GEN 5 1HR TROPONIN T REFLEX: 26 NG/L
GLUCOSE BLDC GLUCOMTR-MCNC: 130 MG/DL (ref 70–99)
GLUCOSE BLDC GLUCOMTR-MCNC: 184 MG/DL (ref 70–99)
IVRT: 71 MS
LEFT ATRIUM VOLUME INDEX: 26.7 ML/M2
LV EF BIPLANE MOD: 60.9 %
MAGNESIUM SERPL-MCNC: 1.9 MG/DL (ref 1.6–2.6)
TROPONIN T % DELTA: -7
TROPONIN T NUMERIC DELTA: -2 NG/L
TROPONIN T SERPL HS-MCNC: 28 NG/L
WHOLE BLOOD HOLD SPECIMEN: NORMAL

## 2025-03-30 PROCEDURE — 25010000002 CEFEPIME PER 500 MG: Performed by: INTERNAL MEDICINE

## 2025-03-30 PROCEDURE — 93005 ELECTROCARDIOGRAM TRACING: CPT | Performed by: FAMILY MEDICINE

## 2025-03-30 PROCEDURE — 94664 DEMO&/EVAL PT USE INHALER: CPT

## 2025-03-30 PROCEDURE — 83735 ASSAY OF MAGNESIUM: CPT | Performed by: FAMILY MEDICINE

## 2025-03-30 PROCEDURE — 94799 UNLISTED PULMONARY SVC/PX: CPT

## 2025-03-30 PROCEDURE — 93325 DOPPLER ECHO COLOR FLOW MAPG: CPT

## 2025-03-30 PROCEDURE — 93321 DOPPLER ECHO F-UP/LMTD STD: CPT

## 2025-03-30 PROCEDURE — 82948 REAGENT STRIP/BLOOD GLUCOSE: CPT

## 2025-03-30 PROCEDURE — 93308 TTE F-UP OR LMTD: CPT

## 2025-03-30 PROCEDURE — 99233 SBSQ HOSP IP/OBS HIGH 50: CPT | Performed by: INTERNAL MEDICINE

## 2025-03-30 PROCEDURE — 25010000002 HYDROMORPHONE 1 MG/ML SOLUTION: Performed by: INTERNAL MEDICINE

## 2025-03-30 PROCEDURE — 84484 ASSAY OF TROPONIN QUANT: CPT | Performed by: FAMILY MEDICINE

## 2025-03-30 PROCEDURE — 25010000002 METHYLPREDNISOLONE PER 40 MG: Performed by: FAMILY MEDICINE

## 2025-03-30 PROCEDURE — 93010 ELECTROCARDIOGRAM REPORT: CPT | Performed by: INTERNAL MEDICINE

## 2025-03-30 PROCEDURE — 25010000002 FUROSEMIDE PER 20 MG: Performed by: INTERNAL MEDICINE

## 2025-03-30 RX ORDER — DRONABINOL 2.5 MG/1
2.5 CAPSULE ORAL 2 TIMES DAILY
Status: DISCONTINUED | OUTPATIENT
Start: 2025-03-30 | End: 2025-03-31 | Stop reason: HOSPADM

## 2025-03-30 RX ORDER — HYDROCODONE BITARTRATE AND ACETAMINOPHEN 10; 325 MG/1; MG/1
1 TABLET ORAL EVERY 4 HOURS PRN
Status: DISCONTINUED | OUTPATIENT
Start: 2025-03-30 | End: 2025-03-31 | Stop reason: HOSPADM

## 2025-03-30 RX ADMIN — FUROSEMIDE 40 MG: 10 INJECTION, SOLUTION INTRAMUSCULAR; INTRAVENOUS at 18:07

## 2025-03-30 RX ADMIN — ALPRAZOLAM 0.25 MG: 0.25 TABLET ORAL at 05:11

## 2025-03-30 RX ADMIN — METHYLPREDNISOLONE SODIUM SUCCINATE 40 MG: 40 INJECTION INTRAMUSCULAR; INTRAVENOUS at 00:08

## 2025-03-30 RX ADMIN — SODIUM CHLORIDE 2000 MG: 9 INJECTION, SOLUTION INTRAVENOUS at 18:07

## 2025-03-30 RX ADMIN — IPRATROPIUM BROMIDE AND ALBUTEROL SULFATE 3 ML: .5; 3 SOLUTION RESPIRATORY (INHALATION) at 12:07

## 2025-03-30 RX ADMIN — FUROSEMIDE 40 MG: 10 INJECTION, SOLUTION INTRAMUSCULAR; INTRAVENOUS at 10:23

## 2025-03-30 RX ADMIN — NICOTINE 1 PATCH: 21 PATCH, EXTENDED RELEASE TRANSDERMAL at 08:43

## 2025-03-30 RX ADMIN — HYDROCODONE BITARTRATE AND ACETAMINOPHEN 1 TABLET: 10; 325 TABLET ORAL at 13:04

## 2025-03-30 RX ADMIN — HYDROMORPHONE HYDROCHLORIDE 0.25 MG: 1 INJECTION, SOLUTION INTRAMUSCULAR; INTRAVENOUS; SUBCUTANEOUS at 15:11

## 2025-03-30 RX ADMIN — HYDROCODONE BITARTRATE AND ACETAMINOPHEN 1 TABLET: 10; 325 TABLET ORAL at 18:07

## 2025-03-30 RX ADMIN — HYDROMORPHONE HYDROCHLORIDE 0.25 MG: 1 INJECTION, SOLUTION INTRAMUSCULAR; INTRAVENOUS; SUBCUTANEOUS at 19:51

## 2025-03-30 RX ADMIN — SODIUM CHLORIDE 2000 MG: 9 INJECTION, SOLUTION INTRAVENOUS at 10:23

## 2025-03-30 RX ADMIN — AZITHROMYCIN DIHYDRATE 250 MG: 250 TABLET ORAL at 15:16

## 2025-03-30 RX ADMIN — HYDROMORPHONE HYDROCHLORIDE 0.25 MG: 1 INJECTION, SOLUTION INTRAMUSCULAR; INTRAVENOUS; SUBCUTANEOUS at 01:57

## 2025-03-30 RX ADMIN — SODIUM CHLORIDE 2000 MG: 9 INJECTION, SOLUTION INTRAVENOUS at 00:56

## 2025-03-30 RX ADMIN — Medication 10 ML: at 10:25

## 2025-03-30 NOTE — PLAN OF CARE
Goal Outcome Evaluation:  Plan of Care Reviewed With: patient           Outcome Evaluation: Pt. non-compliant with call  light use for assist to the bathroom.  Episodes of svt through the shift, though pt. asymptomatic, EKG/ labs performed.

## 2025-03-30 NOTE — PROGRESS NOTES
RT EQUIPMENT DEVICE RELATED - SKIN ASSESSMENT        Skin Assessment:  Patient refused bipap, on 2L NC, skin intact    Liz Juan, RRT

## 2025-03-30 NOTE — NURSING NOTE
Pt became increasingly frustrated and stated she wanted to leave AMA and demanded we call her daughter to come pick her up. Pt requested we place her in a wheelchair so she could take herself out to wait for her daughter. I spoke with her daughter who stated she was en route to the hospital. It seemed her desire was for the pt to stay and continue treatment. I notified Dr. Draper about pt's request to leave AMA. At this time pt has is A&Ox4 with decision making capacity.

## 2025-03-30 NOTE — PROGRESS NOTES
[facility]   Hospitalist Progress Note  Date: 3/30/2025  Patient Name: Isha Llanes  : 1965  MRN: [id number]  Date of admission: 3/28/2025      Subjective   Subjective     Chief Complaint: Hospital status, shortness of breath    Summary: 59 y.o. female with past medical history of diabetes, hypertension, laryngeal cancer status post feeding tube placement, pain pump with chronic narcotic use, chronic severe anxiety on Xanax, Hodgkin's lymphoma, squamous cell carcinoma, dysphagia, CHF, COPD, lupus, ABY, and GERD presented to the ED for evaluation of increased confusion, lethargy, and shortness of breath.  Patient was very nauseous and confused when seen so history is given by daughter at bedside.  Per daughter patient has not been doing well the last few days with persistent nausea, vomiting, and abdominal discomfort.  Since this morning patient has been short of breath, lethargic and and confused.  Of note patient also has dysphagia but continues to eat and drink p.o.  Due to her worsening confusion patient was brought to the ED for further evaluation.  In the ED patient was tachycardic and hypoxic on arrival requiring oxygen supplementation via nasal cannula and eventually BiPAP.  ABG showed that he was in hypercapnic respiratory failure with acidosis and labs showed leukocytosis, hyperglycemia, electrolyte imbalances, and elevated BUN/creatinine ratio.  Chest x-ray showed prominent finding in the infrahilar area which could be possible infiltrate.  She was admitted for further care, broad-spectrum antibiotics and steroids were initiated.  Pulmonology was consulted, patient was kept on continuous NIPPV.  Mental status is improving, however, she is asking for IV pain medications and antianxiolytics repeatedly.  Remains on cefepime and steroids.  She does have a lung nodule that has increased in size, had outpatient CT-guided biopsy planned with Francesco, however, our pulmonologist feels she would  be better served with robotic navigational [facility] on an outpatient basis.    Interval Followup: No acute events overnight.  She has been intermittently lethargic, however, when she wakes up, she is requesting Dilaudid or her Xanax before falling back asleep.  Discussed with her daughter at bedside, her mental status is overall much improved from yesterday.  She understands that her quality life has worsened, however, patient has stated and continues to states she wants [place] everything done and wants to keep fighting.  Francesco is currently in the process of working up what appears to be recurrence of her Hodgkin's lymphoma.  She is still wanting treatment for this even though she is in chronic pain and extremely weak.    Objective   Objective     Vitals:   Temp:  [97.9 °F (36.6 °C)-98.4 °F (36.9 °C)] 98.2 °F (36.8 °C)  Heart Rate:  [] 91  Resp:  [20-25] 20  BP: ()/(62-90) 111/79  Flow (L/min) (Oxygen Therapy):  [2] 2  Physical Exam    Constitutional: Ill-appearing female, falls asleep during interview, awakens and is requesting to be discharged home   Respiratory: Diminished breath sounds in bilateral bases, wheezing noted throughout all lung fields, nonlabored respirations    Cardiovascular: Tachycardic but regular, no MRG   Gastrointestinal: Positive bowel sounds, soft, nondistended   Neurologic: Oriented x 2, moving all extremities spontaneously, lethargic, awakens to voice but falls asleep quickly    Result Review    I have personally reviewed the results below:  [x]  Laboratory personally reviewed BMP, CBC, magnesium, blood sugars, procalcitonin, troponin  []  Microbiology  []  Radiology  []  EKG/Telemetry   []  Cardiology/Vascular   []  Pathology  []  Old records  []  Other:  CBC          3/18/2025    05:33 3/28/2025    09:43 3/29/2025    05:12   CBC   WBC 9.02  12.01  8.58    RBC 4.50  5.26  4.87    Hemoglobin 12.2  14.2  13.0    Hematocrit 41.1  47.2  44.0    MCV 91.3  89.7  90.3    MCH 27.1  27.0   26.7    MCHC 29.7  30.1  29.5    RDW 15.0  14.8  14.6    Platelets 179  165  145      CMP          3/18/2025    01:37 3/18/2025    05:33 3/28/2025    10:41 3/29/2025    05:12   CMP   Glucose  259  361  265    BUN  14  18  13    Creatinine <0.30  0.38  0.53  0.45    EGFR  115.6  106.7  111.0    Sodium  137  133  138    Potassium  4.4  4.7  4.7    Chloride  101  94  96    Calcium  8.4  8.5  8.3    Total Protein   6.7     Albumin   3.7     Globulin   3.0     Total Bilirubin   0.3     Alkaline Phosphatase   73     AST (SGOT)   33     ALT (SGPT)   26     Albumin/Globulin Ratio   1.2     BUN/Creatinine Ratio  36.8  34.0  28.9    Anion Gap  7.5  8.9  8.9        Assessment & Plan   Assessment / Plan   Acute on chronic hypercapnic respiratory failure with acute exacerbation  Hodgkin's lymphoma  Laryngeal cancer  1 cm nodule in the right lower lobe measured 0.8 cm on 2/25/2025   Diabetes mellitus  Hypertension  Questionable lower extremity cellulitis  COPD  ABY  History of lupus  Aortic stenosis status post TAVR    Continue to monitor in the hospital for workup and management of the above  Personally reviewed and discussed CT chest with pulmonology, she does have increasing size of a right lower lobe pulmonary nodule.  It looks as though [facility] oncology is planning on doing a CT-guided biopsy of this nodule. Given its location, risk of pneumothorax would be high with this procedure and pulmonology recommends proceeding with robotic navigational bronchoscopy which plans to do on an outpatient basis  Continue with cefepime and azithromycin for now, infectious workup has largely been negative.  Discussed with pulmonology, recommend completing 5 days of antibiotics, can transition to oral cefdinir at discharge as she has a multitude of antibiotic allergies  Transition IV Solu-Medrol to prednisone to complete a 5-day burst  Continue BiPAP nightly and with naps  Caution with sedating medications, trying to avoid Dilaudid, added  Norco as needed for pain  Continue oral Xanax as needed for anxiety, she uses this at home  Repeat 2D echo pending  Start scheduled DuoNebs, Pulmicort and Brovana twice daily, albuterol as needed  Wean O2 as tolerated keep sats greater than 90%  Continue nicotine patch  Continue Lantus 20 units daily, sliding scale insulin  Palliative care has been consulted.  I have discussed at length with the patient and her daughter, she was remain full code full treatment and despite the fact that her quality life is diminishing significantly, she still has goals that she wishes to accomplish.   Trend renal function and electrolytes with a.m. BMP, magnesium   Trend Hgb and WBC with a.m. CBC     Discussed plan with RN, pulmonology    VTE Prophylaxis:  Pharmacologic VTE prophylaxis orders are present.        CODE STATUS:   Code Status (Patient has no pulse and is not breathing): CPR (Attempt to Resuscitate)  Medical Interventions (Patient has pulse or is breathing): Full Support  Level Of Support Discussed With: Patient

## 2025-03-30 NOTE — PLAN OF CARE
Goal Outcome Evaluation:  Plan of Care Reviewed With: patient        Progress: no change  Outcome Evaluation: Patient refused bipap. Unit is on standby in the room. Currently on 2L NC. Patient refused breathing treatments this morning.

## 2025-03-30 NOTE — THERAPY EVALUATION
RT EQUIPMENT DEVICE RELATED - SKIN ASSESSMENT    RT Medical Equipment/Device:     NIV Mask:  Under-the-nose   size:  B    Skin Assessment:      MOUTH, NOSE, CHEEKS:  Intact    Device Skin Pressure Protection:  Positioning supports utilized    Nurse Notification:  Zayda Ortiz, RRT

## 2025-03-30 NOTE — PLAN OF CARE
Orders:    atorvastatin (LIPITOR) 10 mg tablet; Take 1 tablet (10 mg total) by mouth daily     Goal Outcome Evaluation:           Progress: no change  Outcome Evaluation: Pt remains A&Ox4. VSS. Pt continues to refuse multiple treatments and only requests pain medication. Dr. Draper discussed at length the care plan with pt and pt's daughter. Tube feeding ordered. Pt is undecided if she wants to begin tube feeds. Pt was adamant with daughter that she wanted to go home. Pt seen by pulmonology and discussed possibility of lung biopsy in the near future. Continuing plan of care at this time.

## 2025-03-30 NOTE — PROGRESS NOTES
Pulmonary / Critical Care Progress Note      Patient Name: Isha Llanes  : 1965  MRN: [id number]  Attending:  Víctor Draper MD  Date of admission: 3/28/2025    Subjective   Subjective   Follow-up for acute hypoxic and hypercapnic respiratory failure, enlarging right lobe nodule    No acute events overnight.  Refused BiPAP    This morning,  Currently on 2 L nasal cannula  Reports feeling okay today  Remains bedridden  Refusing BiPAP and breathing treatment  Remains weak and fatigued  Denies any chest pain or chest tightness  Pain better controlled today  No fever or chills        Objective   Objective     Vitals:   Temp:  [97.9 °F (36.6 °C)-98.4 °F (36.9 °C)] 98.2 °F (36.8 °C)  Heart Rate:  [] 91  Resp:  [20-25] 20  BP: ()/(62-90) 94/62  Flow (L/min) (Oxygen Therapy):  [2] 2    Physical Exam   Vital Signs Reviewed   General:  WDWN, Alert, NAD.  Chronically ill-appearing female, lying in bed  HEENT:  PERRL, EOMI.  OP, nares clear  Chest:  good aeration, diminished breath sounds bilaterally with scattered rhonchi, no increased work of breathing on 2 L while at rest  CV: RRR, no MGR, pulses 2+, equal.  Abd:  Soft, NT, ND, + BS, no HSM, PEG tube  EXT:  no clubbing, no cyanosis, no edema  Neuro:  A&Ox3, CN grossly intact, no focal deficits.  Skin: No rashes or lesions noted      Result Review    Result Review:  I have personally reviewed the results from the time of this admission to 3/30/2025 11:09 EDT and agree with these findings:  [x]  Laboratory  [x]  Microbiology  [x]  Radiology  [x]  EKG/Telemetry   []  Cardiology/Vascular   []  Pathology  []  Old records  []  Other:  Most notable findings include:       Lab 25  0512 25  1041 25  0943   WBC 8.58  --  12.01*   HEMOGLOBIN 13.0  --  14.2   HEMATOCRIT 44.0  --  47.2*   PLATELETS 145  --  165   SODIUM 138 133*  --    POTASSIUM 4.7 4.7  --    CHLORIDE 96* 94*  --    CO2 33.1* 30.1*  --    BUN 13 18  --    CREATININE  0.45* 0.53*  --    GLUCOSE 265* 361*  --    CALCIUM 8.3* 8.5*  --    TOTAL PROTEIN  --  6.7  --    ALBUMIN  --  3.7  --    GLOBULIN  --  3.0  --      CT Chest Without Contrast Diagnostic  Result Date: 3/29/2025  CT CHEST WO CONTRAST DIAGNOSTIC Date of Exam: 3/29/2025 1:15 PM EDT Indication: pneumonia. Comparison: CXR 3/28/2025, PET/CT 2/25/2025 Technique: Axial CT images were obtained of the chest without contrast administration.  Reconstructed coronal and sagittal images were also obtained. Automated exposure control and iterative construction methods were used. Findings: Lung window images reveal moderate emphysema. Airspace disease seen in the right middle lobe on 2/25/2025 is improved. Subcentimeter nodules appear smaller. 1 cm x 0.8 cm nodule in the right lower lobe seen on series 3 image 120 measured 0.8 cm on 2/25/2025. 1.1 cm x 0.6 cm subpleural nodule in the medial and inferior right lower lobe seen on image 130 is stable. Small right pleural effusion is not evident on 2/25/2025. Mediastinal windows reveal no mediastinal, hilar, or axillary adenopathy. Extensive coronary artery calcifications are evident. Post TAVR. Gastrostomy tube remains in place. Implanted device is seen in the posterior right flank region. Degenerative spurring is seen in the thoracic spine.     Impression: Impression: Airspace disease seen in the right middle lobe on 2/25/2025 is improved. 1 cm nodule in the right lower lobe measured 0.8 cm on 2/25/2025. Post TAVR. Gastrostomy tube remains in place. Implanted device is seen in the right posterior flank region. Electronically Signed: Triston Mcintyre MD  3/29/2025 4:30 PM EDT  Workstation ID: [id number]    Results for orders placed during the hospital encounter of 05/26/23    Adult Transthoracic Echo Complete w/ Color, Spectral and Contrast if necessary per protocol    Interpretation Summary    Left ventricular ejection fraction appears to be 46 - 50%.    Left ventricular wall thickness is  consistent with mild concentric hypertrophy.    Left ventricular diastolic function was indeterminate.    There is a TAVR valve present.    Moderate to severe tricuspid valve regurgitation is present.    Estimated right ventricular systolic pressure from tricuspid regurgitation is mildly elevated (35-45 mmHg).    There were no apparent intracardiac masses, vegetations or thrombi.    Outpatient PET/CT reviewed  [facility] oncology notes reviewed  Axillary lymph node biopsy from [facility] reviewed and negative for malignancy      Assessment & Plan   Assessment / Plan     Active Hospital Problems:  Active Hospital Problems    Diagnosis     **Acute on chronic respiratory failure with hypoxia     Acute on chronic respiratory failure with hypercapnia     Intractable nausea and vomiting     Generalized weakness     AMS (altered mental status)     History of laryngeal cancer     Malignant neoplasm of supraglottis     Hodgkin lymphoma     Tobacco abuse     Throat pain     Laryngeal cancer     GERD (gastroesophageal reflux disease)     Depression     CAD (coronary artery disease)     Diabetes mellitus, type 2     CHF (congestive heart failure)     Acute exacerbation of chronic obstructive pulmonary disease (COPD)     Chronic pain syndrome      Impression:  Acute hypoxemic and hypercapnic respiratory failure  Community-acquired pneumonia from unspecified organism  Acute exacerbation of COPD  Acute cardiogenic pulmonary edema  Acute decompensated diastolic congestive heart failure  Right lower lobe lung nodule.  Enlarging concerning for primary pulmonary malignancy versus metastatic laryngeal squamous cell carcinoma  History of calcified right lower lobe granuloma.   Squamous cell carcinoma of the larynx status post radiation and history of Hodgkin's and Phoma  History of severe AS status post TAVR  History of MSSA cellulitis     Plan:  Wean O2 to keep SPO2 greater than 90%  BG appears to have compensated chronic  respiratory failure.  Continue NIPPV nightly with naps on current settings  Echocardiogram pending  Continue Lasix 40 mg IV twice daily  Trend renal panel and electrolytes  Continue cefepime and azithromycin for now.  Cultures so far negative.   Chest CT personally reviewed.  This patient's right middle lobe pneumonia compared to last month is markedly improved and resolved.  Unfortunately this right lower lobe lung nodule has enlarged.  I am highly concerned that this is a primary pulmonary malignancy versus a metastasis related to the patient's squamous cell cancer of the larynx.  I recommend getting the patient over her respiratory illness seeing getting her discharge.  As an outpatient we can do an Ion with robotic navigational bronchoscopy to biopsy this nodule.  It looks as though [facility] oncology is planning on doing a CT-guided biopsy of this nodule.  Given its location, risk of pneumothorax would be high with this procedure and I recommend proceeding with robotic navigational bronchoscopy which we will do is inpatient  On day 2 of IV steroids.  Changed to prednisone tomorrow to complete 5 days of therapy  Continue Brovana, Pulmicort, DuoNebs  Pain control per primary  Limit all sedating medications as she is very lethargic  PT/OT/speech therapy evaluate    Will need to follow-up with us as an outpatient.  Will forward this note to my nurse navigator nurse practitioner in the office to make sure they schedule this patient with an outpatient Ion robotic navigational bronchoscopy with me.    VTE Prophylaxis:  Pharmacologic VTE prophylaxis orders are present.    CODE STATUS:   Code Status (Patient has no pulse and is not breathing): CPR (Attempt to Resuscitate)  Medical Interventions (Patient has pulse or is breathing): Full Support  Level Of Support Discussed With: Patient      Labs, imaging, microbiology, notes and medications personally reviewed  Discussed with primary    I, Dr. Enzo Segal, have spent  more than 50% of the total time managing the patient in this encounter today.  This included personally reviewing all pertinent labs, imaging, microbiology and documentation. Also discussing the case with the patient and any available family, the admitting physician and any available ancillary staff.    Electronically signed by LUZ Knight, 03/30/25, 8:57 AM EDT.  Electronically signed by Enzo Segal MD, 03/30/25, 11:09 AM EDT.

## 2025-03-30 NOTE — CONSULTS
Nutrition Services    Patient Name: Isha Llanes  YOB: 1965  MRN: [id number]  Admission date: 3/28/2025      CLINICAL NUTRITION ASSESSMENT      Reason for Assessment  Physician Consult and Tube Feeding Assessment     H&P:  Past Medical History:   Diagnosis Date    Anesthesia     REPORTS HAS GOT REAL EMOTIONAL AND HAS BECOME ANGRY BEFORE    Anxiety     Aortic stenosis, severe     HAS BIO VALVE REPLACED    Arthritis     Asthma     Back pain     Blood clotting disorder     Cancer     Cancer associated pain     HODGKINS LYMPHOMA    CHF (congestive heart failure)     Chronic low back pain with sciatica     Chronic pain disorder     COPD (chronic obstructive pulmonary disease)     Coronary artery disease     Diabetes mellitus     TYPE 2    Diabetes mellitus     Dyspnea on effort     GERD (gastroesophageal reflux disease)     H/O lumpectomy     H/O: hysterectomy     Hiatal hernia     History of degenerative disc disease     History of kidney stones     History of pulmonary embolus (PE)     History of transfusion     AS A CHILD NO TRANSFUSION REACTION    History of transfusion     Hodgkin's lymphoma     5/19/23  5 YEARS SINCE LAST CHEMO TX    Hypertension     Immobility     Liver disease     DENIES ANY CURRENT ISSUES    Lupus     Mitral valve prolapse     Nausea vomiting and diarrhea 3/7/2024    Panic disorder     Pelvic pain     Peripheral neuropathy     Personal history of DVT (deep vein thrombosis)     Presence of intrathecal pump     PTSD (post-traumatic stress disorder)     Sacroiliac joint disease     SI (sacroiliac) joint inflammation     Sleep apnea     Venous insufficiency         Current Problems:   Active Hospital Problems    Diagnosis     **Acute on chronic respiratory failure with hypoxia     Acute on chronic respiratory failure with hypercapnia     Intractable nausea and vomiting     Generalized weakness     AMS (altered mental status)     History of laryngeal cancer     Malignant  "neoplasm of supraglottis     Hodgkin lymphoma     Tobacco abuse     Throat pain     Laryngeal cancer     GERD (gastroesophageal reflux disease)     Depression     CAD (coronary artery disease)     Diabetes mellitus, type 2     CHF (congestive heart failure)     Acute exacerbation of chronic obstructive pulmonary disease (COPD)     Chronic pain syndrome         Nutrition/Diet History         Narrative   Consult received for tubefeeding assessment for pt admitted with [facility]. Extensive head/neck cancer history with c/f new primary lung ca. Followed outpatient by oncology RD and [facility]. Recently switched to Nutren 1.5, facility has Nutren 2.0 available. Pt reporting nausea, no documented vomiting. Past SLP evaluation recommended no PO intake, consult placed for SLP eval. Previously noted to meet criteria for moderate malnutrition. Recs below.     Anthropometrics        Current Height, Weight Height: 167.6 cm (66\")  Weight: 66.1 kg (145 lb 11.6 oz)   Current BMI Body mass index is 23.52 kg/m².   BMI Classification Normal range   % %   Adjusted Body Weight (ABW)    Weight Hx  Wt Readings from Last 30 Encounters:   03/28/25 2000 66.1 kg (145 lb 11.6 oz)   03/28/25 0920 62.4 kg (137 lb 9.6 oz)   03/26/25 1047 62.6 kg (138 lb)   03/18/25 0347 62.7 kg (138 lb 3.7 oz)   03/18/25 0023 63.8 kg (140 lb 10.5 oz)   02/08/25 0602 63.8 kg (140 lb 10.5 oz)   12/23/24 0750 61.3 kg (135 lb 0.5 oz)   12/11/24 0642 58.1 kg (128 lb 1.4 oz)   12/10/24 0541 61 kg (134 lb 7.7 oz)   12/09/24 0531 59.6 kg (131 lb 6.3 oz)   12/07/24 0745 64 kg (141 lb 1.5 oz)   11/07/24 0406 57.7 kg (127 lb 3.3 oz)   11/06/24 2148 59.9 kg (132 lb 0.9 oz)   09/13/24 0943 57.6 kg (127 lb)   08/21/24 1605 65.7 kg (144 lb 13.5 oz)   08/21/24 1421 60.3 kg (133 lb)   08/07/24 0928 60.3 kg (133 lb)   07/20/24 0752 60.5 kg (133 lb 6.1 oz)   07/02/24 1340 60.5 kg (133 lb 6.1 oz)   06/27/24 0950 46.7 kg (103 lb)   06/13/24 0417 58.6 kg (129 lb 3 oz)   06/12/24 " 2236 59.3 kg (130 lb 11.7 oz)   06/06/24 1049 48 kg (105 lb 14.4 oz)   05/29/24 2259 62.2 kg (137 lb 2 oz)   05/23/24 1105 47.6 kg (105 lb)   05/09/24 1042 52.2 kg (115 lb)   05/06/24 1038 58 kg (127 lb 14.4 oz)   04/27/24 1041 64.9 kg (143 lb 1.3 oz)   04/10/24 1200 45.8 kg (101 lb)   04/08/24 1127 45.8 kg (101 lb)   03/19/24 1403 57.2 kg (126 lb)   03/07/24 2200 57.2 kg (126 lb 1.7 oz)   03/07/24 2148 57.2 kg (126 lb 1.7 oz)   03/07/24 1311 59.8 kg (131 lb 12.8 oz)   03/05/24 1112 60.3 kg (133 lb)   02/19/24 0906 60.4 kg (133 lb 2.5 oz)   02/05/24 0657 60.4 kg (133 lb 2.5 oz)   02/05/24 0138 59 kg (130 lb)   01/09/24 1034 59 kg (130 lb)   12/19/23 1322 59 kg (130 lb 1.1 oz)          Wt Change Observation Positive wt trend     Estimated/Assessed Needs  Estimated Needs based on: Current Body Weight       Energy Requirements 35-40 kcal/kg - head/neck cancer increasing needs   EST Needs (kcal/day) 1340-6876       Protein Requirements 2.0 g/kg   EST Daily Needs (g/day) 132       Fluid Requirements 1 ml/kcal    Estimated Needs (mL/day) 8447-8018     Labs/Medications         Pertinent Labs Reviewed.   Results from last 7 days   Lab Units 03/29/25  0512 03/28/25  1041   SODIUM mmol/L 138 133*   POTASSIUM mmol/L 4.7 4.7   CHLORIDE mmol/L 96* 94*   CO2 mmol/L 33.1* 30.1*   BUN mg/dL 13 18   CREATININE mg/dL 0.45* 0.53*   CALCIUM mg/dL 8.3* 8.5*   BILIRUBIN mg/dL  --  0.3   ALK PHOS U/L  --  73   ALT (SGPT) U/L  --  26   AST (SGOT) U/L  --  33*   GLUCOSE mg/dL 265* 361*     Results from last 7 days   Lab Units 03/30/25  0123 03/29/25  0512   MAGNESIUM mg/dL 1.9  --    HEMOGLOBIN g/dL  --  13.0   HEMATOCRIT %  --  44.0     COVID19   Date Value Ref Range Status   03/29/2025 Not Detected Not Detected - Ref. Range Final     Lab Results   Component Value Date    HGBA1C 5.90 (H) 11/29/2023         Pertinent Medications Reviewed.     Malnutrition Severity Assessment              Nutrition Diagnosis         Nutrition Dx Problem 1  Increased nutrient needs related to increased nutrient needs due to catabolic disease as evidenced by  head and neck cancer increasing kcal and pro needs     Nutrition Intervention           Current Nutrition Orders & Evaluation of Intake       Current PO Diet Diet: Diabetic; Consistent Carbohydrate; Fluid Consistency: Thin (IDDSI 0)   Supplement No active supplement orders           Nutrition Intervention/Prescription        Recommend NPO until evaluated by SLP d/t multiple previous SLP recommendations of no safe PO diet    Begin EN via PEG:    Nutren 2.0 @ 60mL/hr, flush 175 q3h  Start at 20mL/hr and advance 10mL q8h to goal as tolerated    Provides: 2640kcal, 111g pro, 2311mL fluid (911 FW + 1400 flush)        Medical Nutrition Therapy/Nutrition Education          Learner     Readiness N/A  N/A     Method     Response N/A  N/A     Monitor/Evaluation        Monitor Pertinent labs, EN delivery/tolerance, GI status, Swallow function, Diet advancement     Nutrition Discharge Plan         To be determined     Electronically signed by:  Darcy Bullock RD  03/30/25 12:52 EDT

## 2025-03-30 NOTE — PLAN OF CARE
Goal Outcome Evaluation:         Pt BIPAP settings are 14/7 28%. Pt wears 2L nc when not on BIPAP. Pt has refused to wear BIPAP for the night/shift.       Problem: Noninvasive Ventilation Acute  Goal: Effective Unassisted Ventilation and Oxygenation  Intervention: Monitor and Manage Noninvasive Ventilation  Flowsheets (Taken 3/29/2025 6603)  Airway/Ventilation Management:   airway patency maintained   oxygen therapy provided   positive pressure ventilation provided  NPPV/CPAP Maintenance: proper fit/secure

## 2025-03-31 NOTE — NURSING NOTE
"Pt. Left [facility] despite multiple staff and family education and concerns expressed. Pt. Refused all of her medications, blood sugar checks, etc. IV removed per pt. Request, heart monitor. All personal belongings taken with patient.  Pt. Left via wc per family member without oxygen. Pt. Stated, \" I do not need oxygen\".   Dr. Ferrari notified of pt. AMA status.   "

## 2025-04-02 LAB
QT INTERVAL: 392 MS
QTC INTERVAL: 503 MS

## 2025-04-02 NOTE — H&P (VIEW-ONLY)
Primary Care Provider  Virgil Salas MD     Referring Provider  No ref. provider found     Patient or patient representative verbalized consent for the use of Ambient Listening during the visit with  [employer] for chart documentation. 4/9/2025  12:18 EDT    Chief Complaint  Cough, Shortness of Breath, COPD, and Follow-up    Subjective            History of Presenting Illness    History of Present Illness  The patient presents for evaluation of a lung nodule, COPD, and chronic hypoxic and hypercapnic respiratory failure. She is accompanied by her daughter and granddaughter along with her grandson.    Patient was hospitalized at [facility] from 2/8/2025 to 2/12/2025 for COPD exacerbation and acute on chronic combined systolic and diastolic congestive heart failure.  Per discharge summary report, patient has a history of type 2 diabetes, COPD, prior PE, CHF, prior AF status post TAVR, prior PE no longer on anticoagulation, history of Hodgkin's lymphoma, and active squamous cell carcinoma of [place] who initially presented to [facility] for shortness of breath.  Patient was found to have influenza infection with COPD exacerbation with increased oxygen requirements and was transferred to [facility] for escalation of care and ENT follow-up.  On arrival patient was in severe respiratory distress with tachycardia, tachypnea, labored breathing.  Unable to obtain history from patient and daughter was at bedside since patient had been had increasingly shortness of breath.  Patient does have a history of laryngeal cancer and is due to follow-up with Dr. Cline for scans and has been able to coordinate outpatient PET scan due to hyperglycemia.  On the day of discharge, patient was frequently tearful regards to her caring condition.  Patient frequently refused numerous treatments but respiratory status improved regardless.  Patient was poorly compliant with nasal cannula.  Patient endorsed high  levels of pain and anxiety but was refusing some of her medications.  It was anticipated that some of her emotional distress was secondary to underlying phobia of hospital settings, nicotine withdrawal, chronic pain, and intermittent hypoxia.  Palliative care was consulted.  Per discharge summary report, plan of care was discussed at length with patient's daughter and patient at bedside over the phone.  Patient was hemodynamically stable but respiratory status tenuous at times.  Patient was discharged home with recommended prednisone by ENT and was stable for discharge per discharge summary report.    Patient had a chest CT scan completed on 3/29/2025.  Report states airspace disease seen in the right middle lobe on 2/25/2025 is improved.  1 cm nodule in the right lower lobe measured 0.8 cm on 2/25/2025.  Post TAVR.  Gastrostomy tube remains in place.  Implanted device is seen in the right posterior flank region.    She has a history of Hodgkin lymphoma, which was treated with chemotherapy. She also has a history of throat cancer and is currently under the care of Dr. Cline.  She has a feeding tube due to her cancer diagnosis and experiences hoarseness from her throat cancer. She occasionally wheezes and coughs. She has a scar on her cheek from a biopsy. She has a history of skin cancer, which was biopsied and diagnosed as sarcoma. She has been experiencing unintentional weight loss due to her cancer diagnosis. She has a history of blood clots and bleeding disorders but is not currently on any blood thinners. She was previously on Plavix due to having a TAVR several years ago and is needing a referral to see [facility] with a new cardiologist.   She is not currently on any daily inhalers for her COPD. She uses albuterol as needed, typically once a day. She does not have a nebulizer machine at home. She is on oxygen as needed and uses [employer] for her oxygen supply.    She has a history of excessive daytime  sleepiness and snoring but does not use a CPAP machine at home. She has a severe panic disorder related to hospitals and typically does not stay beyond 3 days.    She is on prednisone that has been prescribed by Dr. Nunez, radiation oncologist.     SOCIAL HISTORY  She has been smoking since she was 14 years old. She does not vape or use marijuana and has no history of drug use.      Patient denies fever, chills, night sweats, swollen glands in the head and neck, hemoptysis, purulent sputum production, dysphagia, chest pain, palpitations, chest tightness, abdominal pain, nausea, vomiting, and diarrhea.  Patient denies any leg swelling, orthopnea, paroxysmal nocturnal dyspnea.  Patient is able to perform activities of daily living.        Review of Systems     Family History   Problem Relation Age of Onset    Heart failure Mother     Anxiety disorder Mother     Prostate cancer Father     Depression Sister     Bipolar disorder Sister     Pancreatic cancer Sister     ADD / ADHD Brother     Thyroid cancer Maternal Grandmother     Pancreatic cancer Paternal Grandmother     ADD / ADHD Grandson     ADD / ADHD Granddaughter     ADD / ADHD Nephew     Malig Hyperthermia Neg Hx         Social History     Socioeconomic History    Marital status: Legally    Tobacco Use    Smoking status: Every Day     Current packs/day: 2.00     Average packs/day: 2.0 packs/day for 46.3 years (92.5 ttl pk-yrs)     Types: Cigarettes     Start date: 1979     Passive exposure: Current    Smokeless tobacco: Never    Tobacco comments:     Currently smoking 1.5 daily. 04--js   Vaping Use    Vaping status: Never Used   Substance and Sexual Activity    Alcohol use: Never    Drug use: Never    Sexual activity: Not Currently        Past Medical History:   Diagnosis Date    Anesthesia     REPORTS HAS GOT REAL EMOTIONAL AND HAS BECOME ANGRY BEFORE    Anxiety     Aortic stenosis, severe     HAS BIO VALVE REPLACED    Arthritis     Asthma      Back pain     Blood clotting disorder     Cancer     Cancer associated pain     HODGKINS LYMPHOMA    CHF (congestive heart failure)     Chronic low back pain with sciatica     Chronic pain disorder     COPD (chronic obstructive pulmonary disease)     Coronary artery disease     Diabetes mellitus     TYPE 2    Diabetes mellitus     Dyspnea on effort     GERD (gastroesophageal reflux disease)     H/O lumpectomy     H/O: hysterectomy     Hiatal hernia     History of degenerative disc disease     History of kidney stones     History of pulmonary embolus (PE)     History of transfusion     AS A CHILD NO TRANSFUSION REACTION    History of transfusion     Hodgkin's lymphoma     5/19/23  5 YEARS SINCE LAST CHEMO TX    Hypertension     Immobility     Liver disease     DENIES ANY CURRENT ISSUES    Lupus     Mitral valve prolapse     Nausea vomiting and diarrhea 3/7/2024    Panic disorder     Pelvic pain     Peripheral neuropathy     Personal history of DVT (deep vein thrombosis)     Presence of intrathecal pump     PTSD (post-traumatic stress disorder)     Sacroiliac joint disease     SI (sacroiliac) joint inflammation     Sleep apnea     Venous insufficiency           There is no immunization history on file for this patient.    Allergies   Allergen Reactions    Amoxicillin Shortness Of Breath     Has tolerated Cefazolin, Ceftriaxone, and Cefepime -[facility]    Ceclor [Cefaclor] Shortness Of Breath     Has tolerated Cefazolin, Ceftriaxone, and Cefepime -Jermaine Melida, RPH      Penicillins Shortness Of Breath     Has tolerated Cefazolin, Ceftriaxone, and [facility]pime [facility]    Sulfa Antibiotics Rash    Valium [Diazepam] Mental Status Change     DEPRESSED    Ambien [Zolpidem] Mental Status Change    Aspirin GI Intolerance    Ativan [Lorazepam] Mental Status Change    Benadryl [Diphenhydramine] Anxiety     AND MAKES HER HYPER    Biaxin [Clarithromycin] Unknown - Low Severity    Cephalexin Unknown -  Low Severity    Clindamycin Unknown - Low Severity    Compazine [Prochlorperazine Edisylate] Rash    Contrast Dye (Echo Or Unknown Ct/Mr) Other (See Comments)     Caused pain in her arm    Doxycycline Rash    Nsaids GI Intolerance    Phenergan [Promethazine Hcl] GI Intolerance    Promethazine GI Intolerance    Vancomycin Itching          Current Outpatient Medications:     acetaminophen (Tylenol) 325 MG tablet, Take 1 tablet by mouth Every 6 (Six) Hours As Needed for Mild Pain, Headache or Fever., Disp: , Rfl:     albuterol sulfate  (90 Base) MCG/ACT inhaler, Inhale 2 puffs Every 4 (Four) Hours As Needed for Wheezing., Disp: 18 g, Rfl: 2    ALPRAZolam (XANAX) 0.25 MG tablet, Take 1 tablet by mouth 3 times a day., Disp: , Rfl:     famotidine (Pepcid) 40 MG tablet, Take 1 tablet by mouth every night at bedtime., Disp: 90 tablet, Rfl: 1    loperamide (IMODIUM) 2 MG capsule, Take 1 capsule by mouth 4 (Four) Times a Day As Needed for Diarrhea (2 with 1st episode of diarrhea and 1 with each additonal episode, max of 8 per day.)., Disp: 90 capsule, Rfl: 1    mupirocin (BACTROBAN) 2 % ointment, Apply 1 Application topically to the appropriate area as directed 3 (Three) Times a Day., Disp: 30 g, Rfl: 2    naloxone (NARCAN) 4 MG/0.1ML nasal spray, Call 911. Don't prime. [place] in 1 nostril for overdose. Repeat in 2-3 minutes in other nostril if no or minimal breathing/responsiveness., Disp: 2 each, Rfl: 0    ondansetron ODT (ZOFRAN-ODT) 8 MG disintegrating tablet, Place 1 tablet on the tongue Daily As Needed., Disp: , Rfl:     oxyCODONE-acetaminophen (PERCOCET)  MG per tablet, Take 1 tablet by mouth., Disp: , Rfl:     pain patient supplied pump, by Intrathecal route Continuous. Type of Medication: Hydromorphone 15 mg/ml and Bupivacaine 0.5 mg/ml [employer] [phone] Provider:Dr. Boudreaux Provider number: [phone] Basal Dose: Hydromorphone 7.518 mg/day Bupivacaine 0.50924 mg/day Pump capacity: 40ml ( MAX of  Hydromorphone 9.456 mg/ day; Bupivacaine 0.64191 mg /day) Bolus Dose:Hydromorphone 0.500 mg, Bupivacaine 0.91526 mg Max activations: 4 per day Lockout 3 hours. 1 Bolus per every 3 hours  Last refill-  03/06/2025 Alarm date- 05/05/2025 Pump manufacture:[employer], Disp: , Rfl:     PARoxetine (PAXIL) 20 MG tablet, Take 1 tablet by mouth Every Morning., Disp: , Rfl:     predniSONE (DELTASONE) 20 MG tablet, Take 2 tablets by mouth 2 (Two) Times a Day. (Patient taking differently: Take 15 mg by mouth 2 (Two) Times a Day.), Disp: 120 tablet, Rfl: 1    Systane 0.4-0.3 % solution ophthalmic solution (artificial tears), Administer 1-2 drops to both eyes 4 (Four) Times a Day As Needed., Disp: , Rfl:     arformoterol (Brovana) 15 MCG/2ML nebulizer solution, Take 2 mL by nebulization 2 (Two) Times a Day for 30 days., Disp: 120 mL, Rfl: 5    budesonide (Pulmicort) 0.5 MG/2ML nebulizer solution, Take 2 mL by nebulization 2 (Two) Times a Day for 30 days. Rinse mouth out after each use, Disp: 120 mL, Rfl: 5    furosemide (LASIX) 20 MG tablet, Take 1 tablet by mouth Daily. (Patient not taking: Reported on 4/9/2025), Disp: , Rfl:     Insulin Lispro, 1 Unit Dial, (HUMALOG) 100 UNIT/ML solution pen-injector, Inject 14 Units under the skin into the appropriate area as directed Daily., Disp: , Rfl:     ipratropium-albuterol (DUO-NEB) 0.5-2.5 mg/3 ml nebulizer, Take 3 mL by nebulization 4 (Four) Times a Day As Needed for Wheezing for up to 30 days., Disp: 360 mL, Rfl: 5     Objective     Physical Exam  Vital Signs:   WDWN, Alert, NAD.    HEENT:  PERRL, EOMI.  OP, nares clear, no sinus tenderness  Neck:  Supple, no JVD, no thyromegaly.  Lymph: no axillary, cervical, supraclavicular lymphadenopathy noted bilaterally  Chest: Diminished breath sounds bilaterally. No wheezes, rales, or rhonchi appreciated.  Normal work of breathing noted.  Patient is able speak full sentences without difficulty.  CV: RRR, no MGR, pulses 2+, equal.  Abd:  Soft,  "NT, ND, + BS, no HSM, PEG tube  EXT:  no clubbing, no cyanosis, no edema, no joint tenderness  Neuro:  A&Ox3, CN grossly intact, no focal deficits.  Skin: No rashes or lesions noted.    BP 99/64 (BP Location: Right arm, Patient Position: Sitting, Cuff Size: Small Adult)   Pulse 86   Temp 98.1 °F (36.7 °C) (Oral)   Resp 16   Ht 167.6 cm (65.98\")   Wt 54.9 kg (121 lb)   SpO2 90% Comment: room air  BMI 19.54 kg/m²         Result Review :   I have reviewed discharge summary and imaging study reports from recent hospital stay.  I also reviewed chest CT report dated from 3/29/2025.  See scanned reports.      Results        Procedures:      CT Chest Without Contrast Diagnostic  Result Date: 3/29/2025  Impression: Airspace disease seen in the right middle lobe on 2/25/2025 is improved. 1 cm nodule in the right lower lobe measured 0.8 cm on 2/25/2025. Post TAVR. Gastrostomy tube remains in place. Implanted device is seen in the right posterior flank region. Electronically Signed: Triston Mcintyre MD  3/29/2025 4:30 PM EDT  Workstation ID: [id number]    XR Chest 1 View  Result Date: 3/28/2025  Impression: Prominent markings right infrahilar area which in part could relate to shadows from pulmonary vessels. An infiltrate in the right middle/lower lobe not excluded. Electronically Signed: Bear Wright MD  3/28/2025 10:01 AM EDT  Workstation ID: [id number]    XR Chest 1 View  Result Date: 3/18/2025  Mild infiltrate or atelectasis at the right lung base.  3/18/2025 1:29 AM by Dr. Des Carlson MD on Workstation: [id number]      NM PET/CT Skull Base to Mid Thigh  Result Date: 2/26/2025  1.There is a new hypermetabolic left axillary lymph node max SUV 10.5 concerning for neoplasm. Consider biopsy. 2.In the left face soft tissues there is mild skin thickening and stranding. Correlate with any recent procedure in this region. Cellulitis may be possible. There is focal superficial metabolic activity as seen on images 28 through 33 " although solid lesion is not well seen. 3.Hypermetabolic submental lymph node as seen on image 49 concerning for neoplasm. Small superficial hypermetabolic left neck lymph node on image 48 could be reactive or neoplastic. There is a questionable small lymph node adjacent to the inferior left mandible on image 36 also mildly hypermetabolic. 4.Persistent nodular opacity in the right middle lobe with more confluent anterior opacity hypermetabolic max SUV 2.4. There is mild nodular opacity in the lower lobes and lingula. Infectious or inflammatory process favored. Aspiration is possible. Patient does have a percutaneous gastrostomy tube. Correlate with history. Recommend continued follow-up. 5.0.8 cm right lower lobe lung nodule again seen mildly hypermetabolic max SUV 1.8. Neoplastic or infectious etiology possible. 6.Activity adjacent to left hip may be inflammatory. 7.There is focal metabolic activity along the right ischium as seen on image #244. Location may favor infectious or inflammatory process. Neoplasm not excluded. Correlate with exam. Patient could not lay flat. If concern for osteomyelitis MRI may be useful. 8.Focal metabolic activity adjacent to the right 10th rib on image 122. A lesion is not well seen on the CT scan. Inflammatory process possible. Recommend continued follow-up. Electronically Signed: Manuelito Almanzar MD  2/26/2025 6:43 PM EST  Workstation ID: [id number]    XR Chest 1 View  Result Date: 2/8/2025  Probably no significant interval change is suggested radiographically since the 2/10/2024 study with findings, as detailed above.   Portions of this note were completed with a voice recognition program.  Electronically Signed By-Sony Hood MD On:2/8/2025 6:34 AM      IR J or G Tube Contrast Eval  Addendum Date: 1/10/2025  ADDENDUM #1 3 views of the abdomen were obtained following contrast injection into the gastrostomy tube. Electronically Signed: Odilon Sheriff MD  1/10/2025 5:05 PM EST   Workstation ID: [id number] ORIGINAL REPORT: IR J OR G TUBE CONTRAST EVAL Date of Exam: 12/10/2024 6:00 PM EST Indication: Abdominal pain after PEG tube. Findings: A gastrostomy tube has been placed. Contrast injected through the tube opacifies the stomach and duodenum. There is no evidence of contrast leak. A pain pump projects over the right flank. Impression: Gastrostomy tube in satisfactory position. Electronically Signed: Odilon Sheriff MD  12/10/2024 7:00 PM EST  Workstation ID: [id number]    Result Date: 1/10/2025  Impression: Gastrostomy tube in satisfactory position. Electronically Signed: Odilon Sheriff MD  12/10/2024 7:00 PM EST  Workstation ID: [id number]    CT Abdomen Pelvis Without Contrast  Result Date: 12/10/2024  1.Inflammation of the ascending colon consistent with colitis. The PEG tube appears appropriately placed. 2.0.8 cm right lower lobe pulmonary nodule. This is new as compared to the prior study. 3-month follow-up recommended. 3.Additional chronic findings as described above. Electronically Signed: Sony Escobedo MD  12/10/2024 7:11 PM EST  Workstation ID: [id number]    XR Chest 1 View  Result Date: 12/10/2024  Impression: Mild right basilar opacity, which could reflect atelectasis or pneumonia. Electronically Signed: Ryan Farnsworth MD  12/10/2024 6:33 PM EST  Workstation ID: [id number]    FL Video Swallow With Speech Single Contrast  Result Date: 12/10/2024  Impression: Tracheal aspiration of nectar and honey thick liquid barium and applesauce with barium Please consult speech pathology report for further details regarding the exam and for dietary recommendations. Report dictated by: Liz Pisano  I have personally reviewed this case and agree with the findings above: Electronically Signed: Manuelito Almanzar MD  12/10/2024 9:08 AM EST  Workstation ID: [id number]    CT Angiogram Chest Pulmonary Embolism  Result Date: 12/7/2024  1.No evidence of pulmonary embolus. 2. Pathologically enlarged hilar lymph  node on the right. Could be reactive or neoplastic. 3.Moderate emphysema. 4.Narrowing of the airway at the level of the larynx with soft tissue edema and thickening at this location similar to previous CT chest. Question posttreatment changes or active malignancy. Correlate for signs or symptoms of upper airway obstruction. 5.New 7 mm pulmonary nodule right lower lobe. Follow-up CT chest in 3 months time is recommended. Electronically Signed: Mara Pritchard MD  12/7/2024 9:38 AM EST  Workstation ID: [id number]    XR Chest 1 View  Result Date: 12/7/2024  Impression: 1.Pulmonary vascular congestion, which could reflect mild pulmonary edema. 2.No focal pneumonia identified. Electronically Signed: Ryan Farnsworth MD  12/7/2024 7:34 AM EST  Workstation ID: [id number]        Assessment and Plan      Assessment:  Diagnoses and all orders for this visit:    1. Tobacco abuse (Primary)  -     Alpha - 1 - Antitrypsin; Future    2. Chronic obstructive pulmonary disease, unspecified COPD type  -     Alpha - 1 - Antitrypsin; Future  -     Complete PFT - Pre & Post Bronchodilator; Future  -     Home Nebulizer    3. Abnormal chest CT  -     Case Request; Standing  -     Follow Anesthesia Guidlines / Standing Orders; Standing  -     Follow Anesthesia Guidelines / Protocol; Future  -     Case Request  -     Alpha - 1 - Antitrypsin; Future    4. Lung nodule  -     Case Request; Standing  -     Follow Anesthesia Guidlines / Standing Orders; Standing  -     Follow Anesthesia Guidelines / Protocol; Future  -     Case Request  -     Alpha - 1 - Antitrypsin; Future    5. Coronary artery disease involving native heart, unspecified vessel or lesion type, unspecified whether angina present  -     Ambulatory Referral to Cardiology    6. MVP (mitral valve prolapse)  -     Ambulatory Referral to Cardiology    7. S/P TAVR (transcatheter aortic valve replacement)  -     Ambulatory Referral to Cardiology    8. Chronic respiratory failure with hypoxia and  hypercapnia    9. Squamous cell carcinoma of larynx    10. Hodgkin lymphoma, unspecified Hodgkin lymphoma type, unspecified body region    11. 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  Dispense: 120 mL; Refill: 5  -     budesonide (Pulmicort) 0.5 MG/2ML nebulizer solution; Take 2 mL by nebulization 2 (Two) Times a Day for 30 days. Rinse mouth out after each use  Dispense: 120 mL; Refill: 5  -     ipratropium-albuterol (DUO-NEB) 0.5-2.5 mg/3 ml nebulizer; Take 3 mL by nebulization 4 (Four) Times a Day As Needed for Wheezing for up to 30 days.  Dispense: 360 mL; Refill: 5         Assessment & Plan  1. Lung nodule.  A CT scan conducted during her hospital stay revealed a 1 cm lung nodule in the right lower lobe that previously measured 0.8 cm on 2/25/2025 necessitating biopsy. The procedure will be a robotic navigational bronchoscopy    Will send patient for robotic navigational bronchoscopy with bronchoalveolar lavage, brushings, and biopsy.  I have discussed the risks of the procedure with the patient including pneumothorax, hemothorax, bleeding, hypoxia, required mechanical ventilation and death. The patient recognizes these findings, acknowledges these findings and is agreeable to the procedure.    Will have chest CT scan converted over to navigational Ion protocol.    Will have our nurse navigator obtain cardiac clearance.    2. Chronic obstructive pulmonary disease (COPD).  She is currently using albuterol as needed but reports using it once daily, which indicates a need for a daily inhaler.  Patient states that the nebulizer treatments that she received in the hospital were beneficial.  Will start patient on Brovana and Pulmicort nebulizer treatments twice daily.  Patient is advised to rinse her mouth out after each use.  Will also prescribe patient  DuoNeb nebulizer treatments to take as needed.  Patient to continue albuterol inhaler as needed.  Nebulizer machine given to the patient in the office today.  Demonstration/instruction on how to use nebulizer machine performed in the office today.  If any medications are too expensive, she should contact the office.    Patient to continue oxygen to keep SPO2 at 89% and above.    Will order a pulmonary function test.    Alpha-1 antitrypsin level and genotype drawn in the office today.    3.  Chronic hypoxic and hypercapnic respiratory failure.  Patient with excessive daytime sleepiness.  pCO2 on ABGs performed on 3/28/2025 during hospitalization was 67.8.  Will start patient on an NIPPV machine.     Patient suffers from COPD exacerbation and acute on chronic hypoxic and hypercapnic respiratory failure. Pt will need NIPPV for home use due to severity of her respiratory failure secondary to COPD. Mechanical ventilation is required to decrease work of breathing and improve pulmonary status. BiPAP has been ruled out as not being the optimal therapy for treatment, therefor BIPAP will not work for patient as it does not allow for change with patient's tidal volume requirements. NIPPV will be required for use at home to prevent future hospitalizations, admissions, and possible death.     DME coordinator has been notified to start the process of getting patient set up on NIPPV machine.  Order has been signed today.    4. S/P TAVR.  Patient is needing to establish care with a new cardiologist.  Referral for cardiologist placed today.    5.  Smoking status: patient is a current cigarette smoker.  I counseled the patient on smoking cessation.  I counseled the patient on the risks of continued smoking including the risk of lung cancer, head and neck cancer, renal cancer, heart disease, stroke, and early death.  Patient refuses nicotine replacement therapy or pharmacotherapy at this time.  Patient is advised to decrease the  number of cigarettes they are smoking up until the point to where they can quit.    6.  Vaccination status:  patient declines flu, pneumonia, and COVID-19 vaccinations.  Discussed with patient the benefits of vaccination including decreased risk of severe illness, hospitalization and death related to flu, pneumonia, and COVID-19.  Patient verbalized understanding.  Patient is advised to continue to follow CDC recommendations such as social distancing, wearing a mask, and washing hands for least 20 seconds.    7. Patient to call the office, 911, or go to the ER with new or worsening symptoms.    8.  Squamous cell carcinoma of the Larynx.  Patient to follow-up with oncology and radiation oncology as scheduled.    Follow-up  The patient will follow up in 1 week after robotic navigational bronchoscopy, sooner if needed.      I spent 55 minutes caring for Isha on this date of service. This time includes time spent by me in the following activities:preparing for the visit, reviewing tests, obtaining and/or reviewing a separately obtained history, performing a medically appropriate examination and/or evaluation , counseling and educating the patient/family/caregiver, ordering medications, tests, or procedures, referring and communicating with other health care professionals , documenting information in the medical record, independently interpreting results and communicating that information with the patient/family/caregiver, and care coordination    Follow Up   Return for follow up after robotic bronchoscopy.  Patient was given instructions and counseling regarding her condition or for health maintenance advice. Please see specific information pulled into the AVS if appropriate.

## 2025-04-02 NOTE — PROGRESS NOTES
Primary Care Provider  Virgil Salas MD     Referring Provider  No ref. provider found     Patient or patient representative verbalized consent for the use of Ambient Listening during the visit with  LUZ Kerr for chart documentation. 4/9/2025  12:18 EDT    Chief Complaint  Cough, Shortness of Breath, COPD, and Follow-up    Subjective            History of Presenting Illness    History of Present Illness  The patient presents for evaluation of a lung nodule, COPD, and chronic hypoxic and hypercapnic respiratory failure. She is accompanied by her daughter and granddaughter along with her grandson.    Patient was hospitalized at [facility] from 2/8/2025 to 2/12/2025 for COPD exacerbation and acute on chronic combined systolic and diastolic congestive heart failure.  Per discharge summary report, patient has a history of type 2 diabetes, COPD, prior PE, CHF, prior AF status post TAVR, prior PE no longer on anticoagulation, history of Hodgkin's lymphoma, and active squamous cell carcinoma of [place] who initially presented to [facility] for shortness of breath.  Patient was found to have influenza infection with COPD exacerbation with increased oxygen requirements and was transferred to [facility] for escalation of care and ENT follow-up.  On arrival patient was in severe respiratory distress with tachycardia, tachypnea, labored breathing.  Unable to obtain history from patient and daughter was at bedside since patient had been had increasingly shortness of breath.  Patient does have a history of laryngeal cancer and is due to follow-up with Dr. Cline for scans and has been able to coordinate outpatient PET scan due to hyperglycemia.  On the day of discharge, patient was frequently tearful regards to her caring condition.  Patient frequently refused numerous treatments but respiratory status improved regardless.  Patient was poorly compliant with nasal cannula.  Patient endorsed high  levels of pain and anxiety but was refusing some of her medications.  It was anticipated that some of her emotional distress was secondary to underlying phobia of hospital settings, nicotine withdrawal, chronic pain, and intermittent hypoxia.  Palliative care was consulted.  Per discharge summary report, plan of care was discussed at length with patient's daughter and patient at bedside over the phone.  Patient was hemodynamically stable but respiratory status tenuous at times.  Patient was discharged home with recommended prednisone by ENT and was stable for discharge per discharge summary report.    Patient had a chest CT scan completed on 3/29/2025.  Report states airspace disease seen in the right middle lobe on 2/25/2025 is improved.  1 cm nodule in the right lower lobe measured 0.8 cm on 2/25/2025.  Post TAVR.  Gastrostomy tube remains in place.  Implanted device is seen in the right posterior flank region.    She has a history of Hodgkin lymphoma, which was treated with chemotherapy. She also has a history of throat cancer and is currently under the care of Dr. Cline.  She has a feeding tube due to her cancer diagnosis and experiences hoarseness from her throat cancer. She occasionally wheezes and coughs. She has a scar on her cheek from a biopsy. She has a history of skin cancer, which was biopsied and diagnosed as sarcoma. She has been experiencing unintentional weight loss due to her cancer diagnosis. She has a history of blood clots and bleeding disorders but is not currently on any blood thinners. She was previously on Plavix due to having a TAVR several years ago and is needing a referral to see most care with a new cardiologist.   She is not currently on any daily inhalers for her COPD. She uses albuterol as needed, typically once a day. She does not have a nebulizer machine at home. She is on oxygen as needed and uses [employer] for her oxygen supply.    She has a history of excessive daytime  sleepiness and snoring but does not use a CPAP machine at home. She has a severe panic disorder related to hospitals and typically does not stay beyond 3 days.    She is on prednisone that has been prescribed by Dr. Nunez, radiation oncologist.     SOCIAL HISTORY  She has been smoking since she was 14 years old. She does not vape or use marijuana and has no history of drug use.      Patient denies fever, chills, night sweats, swollen glands in the head and neck, hemoptysis, purulent sputum production, dysphagia, chest pain, palpitations, chest tightness, abdominal pain, nausea, vomiting, and diarrhea.  Patient denies any leg swelling, orthopnea, paroxysmal nocturnal dyspnea.  Patient is able to perform activities of daily living.        Review of Systems     Family History   Problem Relation Age of Onset    Heart failure Mother     Anxiety disorder Mother     Prostate cancer Father     Depression Sister     Bipolar disorder Sister     Pancreatic cancer Sister     ADD / ADHD Brother     Thyroid cancer Maternal Grandmother     Pancreatic cancer Paternal Grandmother     ADD / ADHD Grandson     ADD / ADHD Granddaughter     ADD / ADHD Nephew     Malig Hyperthermia Neg Hx         Social History     Socioeconomic History    Marital status: Legally    Tobacco Use    Smoking status: Every Day     Current packs/day: 2.00     Average packs/day: 2.0 packs/day for 46.3 years (92.5 ttl pk-yrs)     Types: Cigarettes     Start date: 1979     Passive exposure: Current    Smokeless tobacco: Never    Tobacco comments:     Currently smoking 1.5 daily. 04--js   Vaping Use    Vaping status: Never Used   Substance and Sexual Activity    Alcohol use: Never    Drug use: Never    Sexual activity: Not Currently        Past Medical History:   Diagnosis Date    Anesthesia     REPORTS HAS GOT REAL EMOTIONAL AND HAS BECOME ANGRY BEFORE    Anxiety     Aortic stenosis, severe     HAS BIO VALVE REPLACED    Arthritis     Asthma      Back pain     Blood clotting disorder     Cancer     Cancer associated pain     HODGKINS LYMPHOMA    CHF (congestive heart failure)     Chronic low back pain with sciatica     Chronic pain disorder     COPD (chronic obstructive pulmonary disease)     Coronary artery disease     Diabetes mellitus     TYPE 2    Diabetes mellitus     Dyspnea on effort     GERD (gastroesophageal reflux disease)     H/O lumpectomy     H/O: hysterectomy     Hiatal hernia     History of degenerative disc disease     History of kidney stones     History of pulmonary embolus (PE)     History of transfusion     AS A CHILD NO TRANSFUSION REACTION    History of transfusion     Hodgkin's lymphoma     5/19/23  5 YEARS SINCE LAST CHEMO TX    Hypertension     Immobility     Liver disease     DENIES ANY CURRENT ISSUES    Lupus     Mitral valve prolapse     Nausea vomiting and diarrhea 3/7/2024    Panic disorder     Pelvic pain     Peripheral neuropathy     Personal history of DVT (deep vein thrombosis)     Presence of intrathecal pump     PTSD (post-traumatic stress disorder)     Sacroiliac joint disease     SI (sacroiliac) joint inflammation     Sleep apnea     Venous insufficiency           There is no immunization history on file for this patient.    Allergies   Allergen Reactions    Amoxicillin Shortness Of Breath     Has tolerated Cefazolin, Ceftriaxone, and Cefepime -[facility]    Ceclor [Cefaclor] Shortness Of Breath     Has tolerated Cefazolin, Ceftriaxone, and Cefepime -Jermaine Melida, RPH      Penicillins Shortness Of Breath     Has tolerated Cefazolin, Ceftriaxone, and [facility]fepime [facility]    Sulfa Antibiotics Rash    Valium [Diazepam] Mental Status Change     DEPRESSED    Ambien [Zolpidem] Mental Status Change    Aspirin GI Intolerance    Ativan [Lorazepam] Mental Status Change    Benadryl [Diphenhydramine] Anxiety     AND MAKES HER HYPER    Biaxin [Clarithromycin] Unknown - Low Severity    Cephalexin Unknown -  Low Severity    Clindamycin Unknown - Low Severity    Compazine [Prochlorperazine Edisylate] Rash    Contrast Dye (Echo Or Unknown Ct/Mr) Other (See Comments)     Caused pain in her arm    Doxycycline Rash    Nsaids GI Intolerance    Phenergan [Promethazine Hcl] GI Intolerance    Promethazine GI Intolerance    Vancomycin Itching          Current Outpatient Medications:     acetaminophen (Tylenol) 325 MG tablet, Take 1 tablet by mouth Every 6 (Six) Hours As Needed for Mild Pain, Headache or Fever., Disp: , Rfl:     albuterol sulfate  (90 Base) MCG/ACT inhaler, Inhale 2 puffs Every 4 (Four) Hours As Needed for Wheezing., Disp: 18 g, Rfl: 2    ALPRAZolam (XANAX) 0.25 MG tablet, Take 1 tablet by mouth 3 times a day., Disp: , Rfl:     famotidine (Pepcid) 40 MG tablet, Take 1 tablet by mouth every night at bedtime., Disp: 90 tablet, Rfl: 1    loperamide (IMODIUM) 2 MG capsule, Take 1 capsule by mouth 4 (Four) Times a Day As Needed for Diarrhea (2 with 1st episode of diarrhea and 1 with each additonal episode, max of 8 per day.)., Disp: 90 capsule, Rfl: 1    mupirocin (BACTROBAN) 2 % ointment, Apply 1 Application topically to the appropriate area as directed 3 (Three) Times a Day., Disp: 30 g, Rfl: 2    naloxone (NARCAN) 4 MG/0.1ML nasal spray, Call 911. Don't prime. [place] in 1 nostril for overdose. Repeat in 2-3 minutes in other nostril if no or minimal breathing/responsiveness., Disp: 2 each, Rfl: 0    ondansetron ODT (ZOFRAN-ODT) 8 MG disintegrating tablet, Place 1 tablet on the tongue Daily As Needed., Disp: , Rfl:     oxyCODONE-acetaminophen (PERCOCET)  MG per tablet, Take 1 tablet by mouth., Disp: , Rfl:     pain patient supplied pump, by Intrathecal route Continuous. Type of Medication: Hydromorphone 15 mg/ml and Bupivacaine 0.5 mg/ml Pentech [phone] Provider:Dr. Boudreaux Provider number: [phone] Basal Dose: Hydromorphone 7.518 mg/day Bupivacaine 0.43850 mg/day Pump capacity: 40ml ( MAX of  Hydromorphone 9.456 mg/ day; Bupivacaine 0.07270 mg /day) Bolus Dose:Hydromorphone 0.500 mg, Bupivacaine 0.96556 mg Max activations: 4 per day Lockout 3 hours. 1 Bolus per every 3 hours  Last refill-  03/06/2025 Alarm date- 05/05/2025 Pump manufacture:[employer], Disp: , Rfl:     PARoxetine (PAXIL) 20 MG tablet, Take 1 tablet by mouth Every Morning., Disp: , Rfl:     predniSONE (DELTASONE) 20 MG tablet, Take 2 tablets by mouth 2 (Two) Times a Day. (Patient taking differently: Take 15 mg by mouth 2 (Two) Times a Day.), Disp: 120 tablet, Rfl: 1    Systane 0.4-0.3 % solution ophthalmic solution (artificial tears), Administer 1-2 drops to both eyes 4 (Four) Times a Day As Needed., Disp: , Rfl:     arformoterol (Brovana) 15 MCG/2ML nebulizer solution, Take 2 mL by nebulization 2 (Two) Times a Day for 30 days., Disp: 120 mL, Rfl: 5    budesonide (Pulmicort) 0.5 MG/2ML nebulizer solution, Take 2 mL by nebulization 2 (Two) Times a Day for 30 days. Rinse mouth out after each use, Disp: 120 mL, Rfl: 5    furosemide (LASIX) 20 MG tablet, Take 1 tablet by mouth Daily. (Patient not taking: Reported on 4/9/2025), Disp: , Rfl:     Insulin Lispro, 1 Unit Dial, (HUMALOG) 100 UNIT/ML solution pen-injector, Inject 14 Units under the skin into the appropriate area as directed Daily., Disp: , Rfl:     ipratropium-albuterol (DUO-NEB) 0.5-2.5 mg/3 ml nebulizer, Take 3 mL by nebulization 4 (Four) Times a Day As Needed for Wheezing for up to 30 days., Disp: 360 mL, Rfl: 5     Objective     Physical Exam  Vital Signs:   WDWN, Alert, NAD.    HEENT:  PERRL, EOMI.  OP, nares clear, no sinus tenderness  Neck:  Supple, no JVD, no thyromegaly.  Lymph: no axillary, cervical, supraclavicular lymphadenopathy noted bilaterally  Chest: Diminished breath sounds bilaterally. No wheezes, rales, or rhonchi appreciated.  Normal work of breathing noted.  Patient is able speak full sentences without difficulty.  CV: RRR, no MGR, pulses 2+, equal.  Abd:  Soft,  "NT, ND, + BS, no HSM, PEG tube  EXT:  no clubbing, no cyanosis, no edema, no joint tenderness  Neuro:  A&Ox3, CN grossly intact, no focal deficits.  Skin: No rashes or lesions noted.    BP 99/64 (BP Location: Right arm, Patient Position: Sitting, Cuff Size: Small Adult)   Pulse 86   Temp 98.1 °F (36.7 °C) (Oral)   Resp 16   Ht 167.6 cm (65.98\")   Wt 54.9 kg (121 lb)   SpO2 90% Comment: room air  BMI 19.54 kg/m²         Result Review :   I have reviewed discharge summary and imaging study reports from recent hospital stay.  I also reviewed chest CT report dated from 3/29/2025.  See scanned reports.      Results        Procedures:      CT Chest Without Contrast Diagnostic  Result Date: 3/29/2025  Impression: Airspace disease seen in the right middle lobe on 2/25/2025 is improved. 1 cm nodule in the right lower lobe measured 0.8 cm on 2/25/2025. Post TAVR. Gastrostomy tube remains in place. Implanted device is seen in the right posterior flank region. Electronically Signed: Triston Mcintyre MD  3/29/2025 4:30 PM EDT  Workstation ID: [id number]    XR Chest 1 View  Result Date: 3/28/2025  Impression: Prominent markings right infrahilar area which in part could relate to shadows from pulmonary vessels. An infiltrate in the right middle/lower lobe not excluded. Electronically Signed: Bear Wright MD  3/28/2025 10:01 AM EDT  Workstation ID: [id number]    XR Chest 1 View  Result Date: 3/18/2025  Mild infiltrate or atelectasis at the right lung base.  3/18/2025 1:29 AM by Dr. Des Carlson MD on Workstation: [id number]      NM PET/CT Skull Base to Mid Thigh  Result Date: 2/26/2025  1.There is a new hypermetabolic left axillary lymph node max SUV 10.5 concerning for neoplasm. Consider biopsy. 2.In the left face soft tissues there is mild skin thickening and stranding. Correlate with any recent procedure in this region. Cellulitis may be possible. There is focal superficial metabolic activity as seen on images 28 through 33 " although solid lesion is not well seen. 3.Hypermetabolic submental lymph node as seen on image 49 concerning for neoplasm. Small superficial hypermetabolic left neck lymph node on image 48 could be reactive or neoplastic. There is a questionable small lymph node adjacent to the inferior left mandible on image 36 also mildly hypermetabolic. 4.Persistent nodular opacity in the right middle lobe with more confluent anterior opacity hypermetabolic max SUV 2.4. There is mild nodular opacity in the lower lobes and lingula. Infectious or inflammatory process favored. Aspiration is possible. Patient does have a percutaneous gastrostomy tube. Correlate with history. Recommend continued follow-up. 5.0.8 cm right lower lobe lung nodule again seen mildly hypermetabolic max SUV 1.8. Neoplastic or infectious etiology possible. 6.Activity adjacent to left hip may be inflammatory. 7.There is focal metabolic activity along the right ischium as seen on image #244. Location may favor infectious or inflammatory process. Neoplasm not excluded. Correlate with exam. Patient could not lay flat. If concern for osteomyelitis MRI may be useful. 8.Focal metabolic activity adjacent to the right 10th rib on image 122. A lesion is not well seen on the CT scan. Inflammatory process possible. Recommend continued follow-up. Electronically Signed: Manuelito Almanzar MD  2/26/2025 6:43 PM EST  Workstation ID: [id number]    XR Chest 1 View  Result Date: 2/8/2025  Probably no significant interval change is suggested radiographically since the 2/10/2024 study with findings, as detailed above.   Portions of this note were completed with a voice recognition program.  Electronically Signed By-Sony Hood MD On:2/8/2025 6:34 AM      IR J or G Tube Contrast Eval  Addendum Date: 1/10/2025  ADDENDUM #1 3 views of the abdomen were obtained following contrast injection into the gastrostomy tube. Electronically Signed: Odilon Sheriff MD  1/10/2025 5:05 PM EST   Workstation ID: [id number] ORIGINAL REPORT: IR J OR G TUBE CONTRAST EVAL Date of Exam: 12/10/2024 6:00 PM EST Indication: Abdominal pain after PEG tube. Findings: A gastrostomy tube has been placed. Contrast injected through the tube opacifies the stomach and duodenum. There is no evidence of contrast leak. A pain pump projects over the right flank. Impression: Gastrostomy tube in satisfactory position. Electronically Signed: Odilon Sheriff MD  12/10/2024 7:00 PM EST  Workstation ID: [id number]    Result Date: 1/10/2025  Impression: Gastrostomy tube in satisfactory position. Electronically Signed: Odilon Sheriff MD  12/10/2024 7:00 PM EST  Workstation ID: [id number]    CT Abdomen Pelvis Without Contrast  Result Date: 12/10/2024  1.Inflammation of the ascending colon consistent with colitis. The PEG tube appears appropriately placed. 2.0.8 cm right lower lobe pulmonary nodule. This is new as compared to the prior study. 3-month follow-up recommended. 3.Additional chronic findings as described above. Electronically Signed: Sony Escobedo MD  12/10/2024 7:11 PM EST  Workstation ID: [id number]    XR Chest 1 View  Result Date: 12/10/2024  Impression: Mild right basilar opacity, which could reflect atelectasis or pneumonia. Electronically Signed: Ryan Farnsworth MD  12/10/2024 6:33 PM EST  Workstation ID: [id number]    FL Video Swallow With Speech Single Contrast  Result Date: 12/10/2024  Impression: Tracheal aspiration of nectar and honey thick liquid barium and applesauce with barium Please consult speech pathology report for further details regarding the exam and for dietary recommendations. Report dictated by: Liz Pisano  I have personally reviewed this case and agree with the findings above: Electronically Signed: Manuelito Almanzar MD  12/10/2024 9:08 AM EST  Workstation ID: [id number]    CT Angiogram Chest Pulmonary Embolism  Result Date: 12/7/2024  1.No evidence of pulmonary embolus. 2. Pathologically enlarged hilar lymph  node on the right. Could be reactive or neoplastic. 3.Moderate emphysema. 4.Narrowing of the airway at the level of the larynx with soft tissue edema and thickening at this location similar to previous CT chest. Question posttreatment changes or active malignancy. Correlate for signs or symptoms of upper airway obstruction. 5.New 7 mm pulmonary nodule right lower lobe. Follow-up CT chest in 3 months time is recommended. Electronically Signed: Mara Pritchard MD  12/7/2024 9:38 AM EST  Workstation ID: [id number]    XR Chest 1 View  Result Date: 12/7/2024  Impression: 1.Pulmonary vascular congestion, which could reflect mild pulmonary edema. 2.No focal pneumonia identified. Electronically Signed: Ryan Farnsworth MD  12/7/2024 7:34 AM EST  Workstation ID: [id number]        Assessment and Plan      Assessment:  Diagnoses and all orders for this visit:    1. Tobacco abuse (Primary)  -     Alpha - 1 - Antitrypsin; Future    2. Chronic obstructive pulmonary disease, unspecified COPD type  -     Alpha - 1 - Antitrypsin; Future  -     Complete PFT - Pre & Post Bronchodilator; Future  -     Home Nebulizer    3. Abnormal chest CT  -     Case Request; Standing  -     Follow Anesthesia Guidlines / Standing Orders; Standing  -     Follow Anesthesia Guidelines / Protocol; Future  -     Case Request  -     Alpha - 1 - Antitrypsin; Future    4. Lung nodule  -     Case Request; Standing  -     Follow Anesthesia Guidlines / Standing Orders; Standing  -     Follow Anesthesia Guidelines / Protocol; Future  -     Case Request  -     Alpha - 1 - Antitrypsin; Future    5. Coronary artery disease involving native heart, unspecified vessel or lesion type, unspecified whether angina present  -     Ambulatory Referral to Cardiology    6. MVP (mitral valve prolapse)  -     Ambulatory Referral to Cardiology    7. S/P TAVR (transcatheter aortic valve replacement)  -     Ambulatory Referral to Cardiology    8. Chronic respiratory failure with hypoxia and  hypercapnia    9. Squamous cell carcinoma of larynx    10. Hodgkin lymphoma, unspecified Hodgkin lymphoma type, unspecified body region    11. 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  Dispense: 120 mL; Refill: 5  -     budesonide (Pulmicort) 0.5 MG/2ML nebulizer solution; Take 2 mL by nebulization 2 (Two) Times a Day for 30 days. Rinse mouth out after each use  Dispense: 120 mL; Refill: 5  -     ipratropium-albuterol (DUO-NEB) 0.5-2.5 mg/3 ml nebulizer; Take 3 mL by nebulization 4 (Four) Times a Day As Needed for Wheezing for up to 30 days.  Dispense: 360 mL; Refill: 5         Assessment & Plan  1. Lung nodule.  A CT scan conducted during her hospital stay revealed a 1 cm lung nodule in the right lower lobe that previously measured 0.8 cm on 2/25/2025 necessitating biopsy. The procedure will be a robotic navigational bronchoscopy    Will send patient for robotic navigational bronchoscopy with bronchoalveolar lavage, brushings, and biopsy.  I have discussed the risks of the procedure with the patient including pneumothorax, hemothorax, bleeding, hypoxia, required mechanical ventilation and death. The patient recognizes these findings, acknowledges these findings and is agreeable to the procedure.    Will have chest CT scan converted over to navigational Ion protocol.    Will have our nurse navigator obtain cardiac clearance.    2. Chronic obstructive pulmonary disease (COPD).  She is currently using albuterol as needed but reports using it once daily, which indicates a need for a daily inhaler.  Patient states that the nebulizer treatments that she received in the hospital were beneficial.  Will start patient on Brovana and Pulmicort nebulizer treatments twice daily.  Patient is advised to rinse her mouth out after each use.  Will also prescribe patient  DuoNeb nebulizer treatments to take as needed.  Patient to continue albuterol inhaler as needed.  Nebulizer machine given to the patient in the office today.  Demonstration/instruction on how to use nebulizer machine performed in the office today.  If any medications are too expensive, she should contact the office.    Patient to continue oxygen to keep SPO2 at 89% and above.    Will order a pulmonary function test.    Alpha-1 antitrypsin level and genotype drawn in the office today.    3.  Chronic hypoxic and hypercapnic respiratory failure.  Patient with excessive daytime sleepiness.  pCO2 on ABGs performed on 3/28/2025 during hospitalization was 67.8.  Will start patient on an NIPPV machine.     Patient suffers from COPD exacerbation and acute on chronic hypoxic and hypercapnic respiratory failure. Pt will need NIPPV for home use due to severity of her respiratory failure secondary to COPD. Mechanical ventilation is required to decrease work of breathing and improve pulmonary status. BiPAP has been ruled out as not being the optimal therapy for treatment, therefor BIPAP will not work for patient as it does not allow for change with patient's tidal volume requirements. NIPPV will be required for use at home to prevent future hospitalizations, admissions, and possible death.     DME coordinator has been notified to start the process of getting patient set up on NIPPV machine.  Order has been signed today.    4. S/P TAVR.  Patient is needing to establish care with a new cardiologist.  Referral for cardiologist placed today.    5.  Smoking status: patient is a current cigarette smoker.  I counseled the patient on smoking cessation.  I counseled the patient on the risks of continued smoking including the risk of lung cancer, head and neck cancer, renal cancer, heart disease, stroke, and early death.  Patient refuses nicotine replacement therapy or pharmacotherapy at this time.  Patient is advised to decrease the  number of cigarettes they are smoking up until the point to where they can quit.    6.  Vaccination status:  patient declines flu, pneumonia, and COVID-19 vaccinations.  Discussed with patient the benefits of vaccination including decreased risk of severe illness, hospitalization and death related to flu, pneumonia, and COVID-19.  Patient verbalized understanding.  Patient is advised to continue to follow CDC recommendations such as social distancing, wearing a mask, and washing hands for least 20 seconds.    7. Patient to call the office, 911, or go to the ER with new or worsening symptoms.    8.  Squamous cell carcinoma of the Larynx.  Patient to follow-up with oncology and radiation oncology as scheduled.    Follow-up  The patient will follow up in 1 week after robotic navigational bronchoscopy, sooner if needed.      I spent 55 minutes caring for Isha on this date of service. This time includes time spent by me in the following activities:preparing for the visit, reviewing tests, obtaining and/or reviewing a separately obtained history, performing a medically appropriate examination and/or evaluation , counseling and educating the patient/family/caregiver, ordering medications, tests, or procedures, referring and communicating with other health care professionals , documenting information in the medical record, independently interpreting results and communicating that information with the patient/family/caregiver, and care coordination    Follow Up   Return for follow up after robotic bronchoscopy.  Patient was given instructions and counseling regarding her condition or for health maintenance advice. Please see specific information pulled into the AVS if appropriate.

## 2025-04-03 LAB
BACTERIA SPEC AEROBE CULT: NORMAL
BACTERIA SPEC AEROBE CULT: NORMAL

## 2025-04-07 ENCOUNTER — TELEPHONE (OUTPATIENT)
Dept: RADIATION ONCOLOGY | Facility: HOSPITAL | Age: 60
End: 2025-04-07
Payer: COMMERCIAL

## 2025-04-07 NOTE — TELEPHONE ENCOUNTER
OSW contacted Ms. Llanes this morning to inquire if she needs transportation arranged for her upcoming follow up visit with the radiation oncologist. Her daughter advised that Ms. Llanes is currently up at [facility] undergoing a biopsy. She is then scheduled to see pulmonary on 4/9 for a pre-ION appointment. Therefore, she inquired if it's neccessary to follow up with the radiation oncologist at this time. OSW consulted with the radiation oncologist and he confirmed to cancel the follow up visit and we'll wait for Dr. Cline to refer Ms. Llanes back to us when ready. OSW relayed this to Ms. Llanes's daughter and she v/u. Encouraged to please contact our office as needed for ongoing support. OSW also relayed this to the radiation oncology  to have the visit cancelled per provider request. OSW support remains available.    Resources/Referrals Provided: Patient navigation/care coordination

## 2025-04-08 NOTE — DISCHARGE SUMMARY
[facility]         HOSPITALIST  DISCHARGE SUMMARY    Patient Name: Isha Llanes  : 1965  MRN: [id number]    Date of Admission: 3/28/2025  Left AGAINST MEDICAL ADVICE on 3/30/2025  Primary Care Physician: Virgil Salas MD    Consults       Date and Time Order Name Status Description    3/29/2025  8:20 AM Inpatient Pulmonology Consult Completed             Active and Resolved Hospital Problems:  Active Hospital Problems    Diagnosis POA    **Acute on chronic respiratory failure with hypoxia [J96.21] Yes    Acute on chronic respiratory failure with hypercapnia [J96.22] Yes    Intractable nausea and vomiting [R11.2] Unknown    Generalized weakness [R53.1] Yes    AMS (altered mental status) [R41.82] Yes    History of laryngeal cancer [Z85.21] Not Applicable    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   Acute on chronic hypercapnic respiratory failure with acute exacerbation  Hodgkin's lymphoma  Community-acquired pneumonia from unspecified organism  Laryngeal cancer  1 cm nodule in the right lower lobe measured 0.8 cm on 2025   Diabetes mellitus  Hypertension  Questionable lower extremity cellulitis  COPD with acute exacerbation  Acute decompensated diastolic congestive heart failure  ABY  History of lupus  Aortic stenosis status post TAVR    Hospital Course     Hospital Course:  Isha Llanes is a 59 y.o. female with past medical history of diabetes,  hypertension, laryngeal cancer status post feeding tube placement, pain pump with chronic narcotic use, chronic severe anxiety on Xanax, Hodgkin's lymphoma, squamous cell carcinoma, dysphagia, CHF, COPD, lupus, ABY, and GERD presented to the ED for evaluation of increased confusion, lethargy, and shortness of breath.  Patient was very nauseous and confused when seen so history is given by daughter at bedside.  Per daughter patient has not been doing well the last few days with persistent nausea, vomiting, and abdominal discomfort.  Since this morning patient has been short of breath, lethargic and and confused.  Of note patient also has dysphagia but continues to eat and drink p.o.  Due to her worsening confusion patient was brought to the ED for further evaluation.  In the ED patient was tachycardic and hypoxic on arrival requiring oxygen supplementation via nasal cannula and eventually BiPAP.  ABG showed that he was in hypercapnic respiratory failure with acidosis and labs showed leukocytosis, hyperglycemia, electrolyte imbalances, and elevated BUN/creatinine ratio.  Chest x-ray showed prominent finding in the infrahilar area which could be possible infiltrate.  She was admitted for further care, broad-spectrum antibiotics and steroids were initiated.  Pulmonology was consulted, patient was kept on continuous NIPPV.  Mental status is improving, however, she is asking for IV pain medications and antianxiolytics repeatedly.  Remains on cefepime and steroids.  She does have a lung nodule that has increased in size, had outpatient CT-guided biopsy planned with Francesco, however, our pulmonologist feels she would be better served with robotic navigational bronc on an outpatient basis.  Unfortunately, patient has severe anxiety while hospitalized.  Plan was to keep the patient in-house for continued treatment and workup.  However, patient became frustrated and ultimately left AGAINST MEDICAL ADVICE on 3/30.  Multiple  staff and family provided education and concerns expressed.  Patient refused all of her medications, blood sugar checks, etc. patient was still requiring oxygen but removed it and refused to walk test prior to discharge.  She left AGAINST MEDICAL ADVICE on 3/30/2025.  Recommend follow-up with PCP and pulmonology within 1 to 2 weeks.    Electronically signed by Víctor Li MD, 04/08/25, 7:26 AM EDT.

## 2025-04-09 ENCOUNTER — OFFICE VISIT (OUTPATIENT)
Dept: PULMONOLOGY | Facility: CLINIC | Age: 60
End: 2025-04-09
Payer: COMMERCIAL

## 2025-04-09 VITALS
HEART RATE: 86 BPM | DIASTOLIC BLOOD PRESSURE: 64 MMHG | HEIGHT: 66 IN | TEMPERATURE: 98.1 F | WEIGHT: 121 LBS | OXYGEN SATURATION: 90 % | RESPIRATION RATE: 16 BRPM | BODY MASS INDEX: 19.44 KG/M2 | SYSTOLIC BLOOD PRESSURE: 99 MMHG

## 2025-04-09 DIAGNOSIS — R91.1 LUNG NODULE: ICD-10-CM

## 2025-04-09 DIAGNOSIS — I25.10 CORONARY ARTERY DISEASE INVOLVING NATIVE HEART, UNSPECIFIED VESSEL OR LESION TYPE, UNSPECIFIED WHETHER ANGINA PRESENT: ICD-10-CM

## 2025-04-09 DIAGNOSIS — C32.1 MALIGNANT NEOPLASM OF SUPRAGLOTTIS: ICD-10-CM

## 2025-04-09 DIAGNOSIS — C81.90 HODGKIN LYMPHOMA, UNSPECIFIED HODGKIN LYMPHOMA TYPE, UNSPECIFIED BODY REGION: ICD-10-CM

## 2025-04-09 DIAGNOSIS — I34.1 MVP (MITRAL VALVE PROLAPSE): ICD-10-CM

## 2025-04-09 DIAGNOSIS — Z85.21 HISTORY OF LARYNGEAL CANCER: ICD-10-CM

## 2025-04-09 DIAGNOSIS — Z72.0 TOBACCO ABUSE: Primary | ICD-10-CM

## 2025-04-09 DIAGNOSIS — Z95.2 S/P TAVR (TRANSCATHETER AORTIC VALVE REPLACEMENT): ICD-10-CM

## 2025-04-09 DIAGNOSIS — J96.11 CHRONIC RESPIRATORY FAILURE WITH HYPOXIA AND HYPERCAPNIA: ICD-10-CM

## 2025-04-09 DIAGNOSIS — R93.89 ABNORMAL CHEST CT: ICD-10-CM

## 2025-04-09 DIAGNOSIS — C32.9 SQUAMOUS CELL CARCINOMA OF LARYNX: ICD-10-CM

## 2025-04-09 DIAGNOSIS — J96.12 CHRONIC RESPIRATORY FAILURE WITH HYPOXIA AND HYPERCAPNIA: ICD-10-CM

## 2025-04-09 DIAGNOSIS — J44.9 CHRONIC OBSTRUCTIVE PULMONARY DISEASE, UNSPECIFIED COPD TYPE: ICD-10-CM

## 2025-04-09 RX ORDER — OXYCODONE AND ACETAMINOPHEN 10; 325 MG/1; MG/1
1 TABLET ORAL
COMMUNITY
Start: 2025-04-07 | End: 2025-04-10

## 2025-04-09 RX ORDER — IPRATROPIUM BROMIDE AND ALBUTEROL SULFATE 2.5; .5 MG/3ML; MG/3ML
3 SOLUTION RESPIRATORY (INHALATION) 4 TIMES DAILY PRN
Qty: 360 ML | Refills: 5 | Status: SHIPPED | OUTPATIENT
Start: 2025-04-09 | End: 2025-05-09

## 2025-04-09 RX ORDER — BUDESONIDE 0.5 MG/2ML
0.5 INHALANT ORAL
Qty: 120 ML | Refills: 5 | Status: SHIPPED | OUTPATIENT
Start: 2025-04-09 | End: 2025-05-09

## 2025-04-09 RX ORDER — ARFORMOTEROL TARTRATE 15 UG/2ML
15 SOLUTION RESPIRATORY (INHALATION)
Qty: 120 ML | Refills: 5 | Status: SHIPPED | OUTPATIENT
Start: 2025-04-09 | End: 2025-05-09

## 2025-04-10 ENCOUNTER — TELEPHONE (OUTPATIENT)
Dept: NEUROLOGY | Facility: CLINIC | Age: 60
End: 2025-04-10

## 2025-04-10 NOTE — TELEPHONE ENCOUNTER
Scheduling called to schedule MRI     Referral response below     04-08 SPOKE TO DAUGHTER DALIA.  PT HAS A PAIN PUMP THAT IS CONTINUOUS FOR HER CANCER. SHE DOES NOT WANT TO STOP IT AND FEELS THIS TEST IS TOO DANGEROUS FOR HER.

## 2025-04-10 NOTE — PRE-PROCEDURE INSTRUCTIONS
PATIENT INSTRUCTED TO BE:    - NOTHING TO EAT AFTER MIDNIGHT OR CHEW, EXCEPT CAN HAVE CLEAR LIQUIDS 2 HOURS PRIOR TO SURGERY ARRIVAL TIME , NO MORE THAN 8 OZ. (NOTHING RED)     - TO HOLD ALL VITAMINS, SUPPLEMENTS, NSAIDS FOR ONE WEEK PRIOR TO THEIR SURGICAL PROCEDURE        - BATHING INSTRUCTIONS GIVEN    INSTRUCTED NO LOTIONS, JEWELRY, PIERCINGS,  NAIL POLISH, OR DEODORANT DAY OF SURGERY    - IF DIABETIC, CHECK BLOOD GLUCOSE IF LESS THAN 70 OR HAVING SYMPTOMS CALL THE PREOP AREA FOR INSTRUCTIONS ON AM OF SURGERY ([phone] )    -INSTRUCTED TO TAKE THE FOLLOWING MEDICATIONS THE DAY OF SURGERY WITH SIPS OF WATER:   Xanax, Paxil, Lispro 7 Units, Prednisone  If needed Albuterol inhaler        - DO NOT BRING ANY MEDICATIONS WITH YOU TO THE HOSPITAL THE DAY OF SURGERY, EXCEPT IF USE INHALERS. BRING INHALERS DAY OF SURGERY       - BRING CPAP OR BIPAP TO THE HOSPITAL ONLY IF YOU ARE SPENDING THE NIGHT    - DO NOT SMOKE OR VAPE 24 HOURS PRIOR TO PROCEDURE PER ANESTHESIA REQUEST     -MAKE SURE YOU HAVE A RIDE HOME OR SOMEONE TO STAY WITH YOU THE DAY OF THE PROCEDURE AFTER YOU GO HOME     - FOLLOW ANY OTHER INSTRUCTIONS GIVEN TO YOU BY YOUR SURGEON'S OFFICE.     Main Entrance [facility], Take elevator to first floor, turn left and check in at registration   - YOU WILL RECEIVE A PHONE CALL THE DAY PRIOR TO SURGERY BETWEEN 1PM AND 4 PM WITH ARRIVAL TIME, IF YOUR SURGERY IS ON A MONDAY YOU WILL RECEIVE A CALL THE FRIDAY PRIOR TO SURGERY DATE    - BRING CASH OR CREDIT CARD FOR COPAYMENT OF MEDICATIONS AFTER SURGERY IF YOU USE THE HOSPITAL PHARMACY (MEDS TO BED)    - PREADMISSION TESTING NURSE XIN HOLT [phone] IF HAVE ANY QUESTIONS     -PATIENT PROVIDED THE NUMBER FOR PREOP SURGICAL DEPT IF HAD QUESTIONS AFTER HOURS PRIOR TO SURGERY ([phone] ).  INFORMED PT IF NO ANSWER, LEAVE A MESSAGE AND SOMEONE WILL RETURN THEIR CALL       PATIENT VERBALIZED UNDERSTANDING

## 2025-04-11 NOTE — SIGNIFICANT NOTE
DR. CALVILLO REV. PT CHART, NOTING ABSENCE OF CARDIOLOGY F/U POST AVR. REQ. CARDIOLOGY CLEARANCE. DR. SOUZA/KAMERON NOTIFIED AND THAT PT UNAWARE.

## 2025-04-16 ENCOUNTER — TELEPHONE (OUTPATIENT)
Dept: GASTROENTEROLOGY | Facility: CLINIC | Age: 60
End: 2025-04-16
Payer: COMMERCIAL

## 2025-04-16 NOTE — TELEPHONE ENCOUNTER
Left message with patient asking for a returned call to follow up on overdue results for stool studies.

## 2025-04-17 ENCOUNTER — OFFICE VISIT (OUTPATIENT)
Dept: CARDIOLOGY | Facility: CLINIC | Age: 60
End: 2025-04-17
Payer: COMMERCIAL

## 2025-04-17 VITALS
WEIGHT: 130 LBS | HEART RATE: 111 BPM | BODY MASS INDEX: 20.89 KG/M2 | SYSTOLIC BLOOD PRESSURE: 106 MMHG | HEIGHT: 66 IN | DIASTOLIC BLOOD PRESSURE: 82 MMHG

## 2025-04-17 DIAGNOSIS — J43.9 PULMONARY EMPHYSEMA, UNSPECIFIED EMPHYSEMA TYPE: ICD-10-CM

## 2025-04-17 DIAGNOSIS — I70.213 ATHEROSCLEROSIS OF NATIVE ARTERIES OF EXTREMITIES WITH INTERMITTENT CLAUDICATION, BILATERAL LEGS: ICD-10-CM

## 2025-04-17 DIAGNOSIS — Z95.2 S/P TAVR (TRANSCATHETER AORTIC VALVE REPLACEMENT): ICD-10-CM

## 2025-04-17 DIAGNOSIS — Z01.810 PRE-OPERATIVE CARDIOVASCULAR EXAMINATION: Primary | ICD-10-CM

## 2025-04-17 RX ORDER — METOPROLOL SUCCINATE 25 MG/1
25 TABLET, EXTENDED RELEASE ORAL
Qty: 90 TABLET | Refills: 3 | Status: SHIPPED | OUTPATIENT
Start: 2025-04-17

## 2025-04-17 NOTE — PROGRESS NOTES
CARDIOLOGY INITIAL CONSULT       Chief Complaint  surgery clearance, Establish Care, and Rapid Heart Rate    Subjective            Isha Llanes presents to [facility] CARDIOLOGY  History of Present Illness  The patient is referred for preoperative cardiovascular evaluation.  She is scheduled for a bronchoscopy and biopsy of a pulmonary nodule.  She has a history of hypertension lymphoma and active squamous cell carcinoma of the larynx.  She received radiation therapy.  Patient also have COPD with chronic respiratory failure.  She has severe arctic stenosis with TAVR with a 23 Armenta valve in 2021 at [facility].  The patient had a 2D echocardiogram showed preserved ejection fraction and normal bioprosthetic aortic valve function with mildly impaired diastolic function.  She had a cardiac catheterization in 2023 which showed minimal nonobstructive coronary artery disease.  Her physical activities are very limited and she is very much wheelchair-bound.  Her EKG showed sinus tachycardia with underlying left bundle branch block.  She denies chest pain or palpitations.       Past History:    Medical History:  Past Medical History:   Diagnosis Date    Anesthesia     REPORTS HAS GOT REAL EMOTIONAL AND HAS BECOME ANGRY BEFORE    Anxiety     Aortic stenosis, severe     HAS BIO VALVE REPLACED    Arthritis     Asthma     Back pain     Blood clotting disorder     test to find out what type    Cancer     CHF (congestive heart failure)     Chronic low back pain with sciatica     Chronic pain disorder     COPD (chronic obstructive pulmonary disease)     Coronary artery disease     looking to follow with Cardio    Diabetes mellitus     TYPE 2    Dyspnea on effort     Fatty liver     GERD (gastroesophageal reflux disease)     H/O lumpectomy     H/O: hysterectomy     Hiatal hernia     History of degenerative disc disease     History of kidney stones     History of pulmonary embolus (PE)     History of  transfusion     AS A CHILD NO TRANSFUSION REACTION    Hodgkin's lymphoma     5/19/23  5 YEARS SINCE LAST CHEMO TX    Hypertension     Immobility     Lupus     Nausea vomiting and diarrhea 03/07/2024    Panic disorder     Pelvic pain     Peripheral neuropathy     Personal history of DVT (deep vein thrombosis)     over 20 years    PONV (postoperative nausea and vomiting)     Presence of intrathecal pump     PTSD (post-traumatic stress disorder)     Sacroiliac joint disease     SI (sacroiliac) joint inflammation     Venous insufficiency        Surgical History: has a past surgical history that includes Back surgery; Cholecystectomy; Bladder repair; Tumor removal; Lymphadenectomy; Tubal ligation; Cardiac catheterization (N/A, 03/06/2019); Pain Pump Insertion/Revision (N/A, 10/18/2019); Pain Pump Insertion/Revision (N/A, 11/23/2020); Cardiac valve replacement (2021); Esophagogastroduodenoscopy (N/A, 12/29/2021); Colonoscopy (N/A, 12/29/2021); Colonoscopy (N/A, 04/13/2022); Portacath placement; direct laryngoscopy, esophagoscopy, bronchoscopy (N/A, 05/22/2023); Abdominal surgery; Back surgery; Cardiac catheterization; Colonoscopy; Esophagogastroduodenoscopy; Cardiac surgery; Hysterectomy; Skin biopsy; Tonsillectomy; Cardiac catheterization (Left, 05/31/2023); Peg Tube Insertion (N/A, 07/26/2023); Esophagogastroduodenoscopy (N/A, 04/16/2024); and Esophagogastroduodenoscopy w/ PEG (N/A, 12/10/2024).     Family History: family history includes ADD / ADHD in her brother, granddaughter, grandson, and nephew; Anxiety disorder in her mother; Bipolar disorder in her sister; Depression in her sister; Heart failure in her mother; Pancreatic cancer in her paternal grandmother and sister; Prostate cancer in her father; Thyroid cancer in her maternal grandmother.     Social History: reports that she has been smoking cigarettes. She started smoking about 46 years ago. She has a 92.6 pack-year smoking history. She has been exposed to  tobacco smoke. She has never used smokeless tobacco. She reports that she does not drink alcohol and does not use drugs.    Allergies: Amoxicillin, Ceclor [cefaclor], Penicillins, Sulfa antibiotics, Valium [diazepam], Ambien [zolpidem], Aspirin, Ativan [lorazepam], Benadryl [diphenhydramine], Biaxin [clarithromycin], Cephalexin, Clindamycin, Compazine [prochlorperazine edisylate], Contrast dye (echo or unknown ct/mr), Doxycycline, Nsaids, Phenergan [promethazine hcl], Promethazine, and Vancomycin    Current Outpatient Medications on File Prior to Visit   Medication Sig    acetaminophen (Tylenol) 325 MG tablet Take 1 tablet by mouth Every 6 (Six) Hours As Needed for Mild Pain, Headache or Fever.    albuterol sulfate  (90 Base) MCG/ACT inhaler Inhale 2 puffs Every 4 (Four) Hours As Needed for Wheezing.    ALPRAZolam (XANAX) 0.25 MG tablet Take 1 tablet by mouth 3 times a day.    arformoterol (Brovana) 15 MCG/2ML nebulizer solution Take 2 mL by nebulization 2 (Two) Times a Day for 30 days.    budesonide (Pulmicort) 0.5 MG/2ML nebulizer solution Take 2 mL by nebulization 2 (Two) Times a Day for 30 days. Rinse mouth out after each use    famotidine (Pepcid) 40 MG tablet Take 1 tablet by mouth every night at bedtime.    furosemide (LASIX) 20 MG tablet Take 1 tablet by mouth Daily.    Insulin Lispro, 1 Unit Dial, (HUMALOG) 100 UNIT/ML solution pen-injector Inject 14 Units under the skin into the appropriate area as directed Daily.    ipratropium-albuterol (DUO-NEB) 0.5-2.5 mg/3 ml nebulizer Take 3 mL by nebulization 4 (Four) Times a Day As Needed for Wheezing for up to 30 days.    loperamide (IMODIUM) 2 MG capsule Take 1 capsule by mouth 4 (Four) Times a Day As Needed for Diarrhea (2 with 1st episode of diarrhea and 1 with each additonal episode, max of 8 per day.).    mupirocin (BACTROBAN) 2 % ointment Apply 1 Application topically to the appropriate area as directed 3 (Three) Times a Day.    naloxone (NARCAN) 4  "MG/0.1ML nasal spray Call 911. Don't prime. Ajo in 1 nostril for overdose. Repeat in 2-3 minutes in other nostril if no or minimal breathing/responsiveness.    ondansetron ODT (ZOFRAN-ODT) 8 MG disintegrating tablet Place 1 tablet on the tongue Daily As Needed.    pain patient supplied pump by Intrathecal route Continuous. Type of Medication: Hydromorphone 15 mg/ml and Bupivacaine 0.5 mg/ml  [employer] [phone]  Provider:Dr. Boudreaux  Provider number: [phone]  Basal Dose: Hydromorphone 7.518 mg/day Bupivacaine 0.44992 mg/day  Pump capacity: 40ml  ( MAX of Hydromorphone 9.456 mg/ day; Bupivacaine 0.32499 mg /day)  Bolus Dose:Hydromorphone 0.500 mg, Bupivacaine 0.31244 mg  Max activations: 4 per day  Lockout 3 hours. 1 Bolus per every 3 hours   Last refill-  03/06/2025  Alarm date- 05/05/2025  Pump manufacture:[employer]    PARoxetine (PAXIL) 20 MG tablet Take 1 tablet by mouth Every Morning.    predniSONE (DELTASONE) 20 MG tablet Take 2 tablets by mouth 2 (Two) Times a Day. (Patient taking differently: Take 15 mg by mouth 2 (Two) Times a Day.)    Systane 0.4-0.3 % solution ophthalmic solution (artificial tears) Administer 1-2 drops to both eyes 4 (Four) Times a Day As Needed.     No current facility-administered medications on file prior to visit.          Review of Systems :all systems were reviewed and negative save for fatigue and chronic shortness of breath    Objective     /82 (BP Location: Left arm)   Pulse 111   Ht 167.6 cm (66\")   Wt 59 kg (130 lb)   BMI 20.98 kg/m²       Physical Exam  Alert  Heart: SM + no gallops, no rubs.  Lungs: diminished breath sounds  LE: no edema  Neurologic. No apparent motor deficits.     Result Review :     The following data was reviewed by: Fredis Og MD on 04/17/2025:    CMP          3/18/2025    01:37 3/18/2025    05:33 3/28/2025    10:41 3/29/2025    05:12   CMP   Glucose  259  361  265    BUN  14  18  13    Creatinine <0.30  0.38  0.53  0.45    EGFR  " 115.6  106.7  111.0    Sodium  137  133  138    Potassium  4.4  4.7  4.7    Chloride  101  94  96    Calcium  8.4  8.5  8.3    Total Protein   6.7     Albumin   3.7     Globulin   3.0     Total Bilirubin   0.3     Alkaline Phosphatase   73     AST (SGOT)   33     ALT (SGPT)   26     Albumin/Globulin Ratio   1.2     BUN/Creatinine Ratio  36.8  34.0  28.9    Anion Gap  7.5  8.9  8.9      CBC          3/18/2025    05:33 3/28/2025    09:43 3/29/2025    05:12   CBC   WBC 9.02  12.01  8.58    RBC 4.50  5.26  4.87    Hemoglobin 12.2  14.2  13.0    Hematocrit 41.1  47.2  44.0    MCV 91.3  89.7  90.3    MCH 27.1  27.0  26.7    MCHC 29.7  30.1  29.5    RDW 15.0  14.8  14.6    Platelets 179  165  145      TSH          6/13/2024    05:23 8/21/2024    22:08   TSH   TSH 1.190  1.130           Data reviewed: Cardiology studies    Results for orders placed during the hospital encounter of 03/28/25    Adult Transthoracic Echo Limited W/ Cont if Necessary Per Protocol    Interpretation Summary    Left ventricular ejection fraction appears to be 61 - 65%.    Left ventricular wall thickness is consistent with mild to moderate septal asymmetric hypertrophy.    Left ventricular diastolic function is consistent with (grade I) impaired relaxation.    The left atrial cavity is mildly dilated.    There is a prosthetic aortic valve of unknown type present.    Estimated right ventricular systolic pressure from tricuspid regurgitation is normal (<35 mmHg).    There is a trivial pericardial effusion.                   Assessment and Plan        Diagnoses and all orders for this visit:    1. Pre-operative cardiovascular examination (Primary)    2. S/P TAVR (transcatheter aortic valve replacement)    3. Atherosclerosis of native arteries of extremities with intermittent claudication, bilateral legs    4. Pulmonary emphysema, unspecified emphysema type    Other orders  -     metoprolol succinate XL (TOPROL-XL) 25 MG 24 hr tablet; Take 1 tablet by  mouth every night at bedtime.  Dispense: 90 tablet; Refill: 3        The patient have preserved cardiac function by 2D echo with normal bioprosthetic aortic valve.  She have minimal nonobstructive CAD by cath in 2023.  I will initiate metoprolol XL 25 minutes oral at bedtime.  I will continue with current medical therapy.  She is at intermediate reasonable risk for cardiovascular complications during bronchoscopic biopsy procedure.  Continue with physical therapy and recommendations by oncology and pulmonary.  Will reevaluate in 3 months I will do echo yearly.  Will review her lipid profile at the next office visit.  She appears debilitated and frail.    I spent 45 minutes caring for Isha on this date of service. This time includes time spent by me in the following activities:reviewing tests, obtaining and/or reviewing a separately obtained history, performing a medically appropriate examination and/or evaluation , ordering medications, tests, or procedures, and documenting information in the medical record    Follow Up     Return in about 3 months (around 7/17/2025) for After testing, Levon.    Patient was given instructions and counseling regarding her condition or for health maintenance advice. Please see specific information pulled into the AVS if appropriate.

## 2025-04-21 ENCOUNTER — TELEPHONE (OUTPATIENT)
Dept: PULMONOLOGY | Facility: CLINIC | Age: 60
End: 2025-04-21
Payer: COMMERCIAL

## 2025-04-21 ENCOUNTER — ANESTHESIA EVENT (OUTPATIENT)
Dept: PERIOP | Facility: HOSPITAL | Age: 60
End: 2025-04-21
Payer: COMMERCIAL

## 2025-04-21 DIAGNOSIS — R19.7 DIARRHEA, UNSPECIFIED TYPE: ICD-10-CM

## 2025-04-21 RX ORDER — LOPERAMIDE HYDROCHLORIDE 2 MG/1
2 CAPSULE ORAL 4 TIMES DAILY PRN
Qty: 120 CAPSULE | Refills: 1 | Status: SHIPPED | OUTPATIENT
Start: 2025-04-21

## 2025-04-22 ENCOUNTER — HOSPITAL ENCOUNTER (OUTPATIENT)
Facility: HOSPITAL | Age: 60
Discharge: HOME | End: 2025-04-22
Attending: INTERNAL MEDICINE | Admitting: INTERNAL MEDICINE
Payer: COMMERCIAL

## 2025-04-22 ENCOUNTER — ANESTHESIA (OUTPATIENT)
Dept: PERIOP | Facility: HOSPITAL | Age: 60
End: 2025-04-22
Payer: COMMERCIAL

## 2025-04-22 ENCOUNTER — APPOINTMENT (OUTPATIENT)
Dept: GENERAL RADIOLOGY | Facility: HOSPITAL | Age: 60
End: 2025-04-22
Payer: COMMERCIAL

## 2025-04-22 VITALS
HEART RATE: 105 BPM | RESPIRATION RATE: 18 BRPM | BODY MASS INDEX: 20.87 KG/M2 | DIASTOLIC BLOOD PRESSURE: 78 MMHG | TEMPERATURE: 97.3 F | SYSTOLIC BLOOD PRESSURE: 105 MMHG | WEIGHT: 129.85 LBS | HEIGHT: 66 IN | OXYGEN SATURATION: 92 %

## 2025-04-22 DIAGNOSIS — R93.89 ABNORMAL CHEST CT: ICD-10-CM

## 2025-04-22 DIAGNOSIS — R91.1 LUNG NODULE: ICD-10-CM

## 2025-04-22 PROBLEM — R06.89 HYPERCAPNIA: Status: ACTIVE | Noted: 2025-04-22

## 2025-04-22 LAB
ACB CMPLX DNA BAL NAA+NON-PRB-NCNCRNG: NOT DETECTED
ATMOSPHERIC PRESS: 748.9 MMHG
BASE EXCESS BLDV CALC-SCNC: 9.7 MMOL/L (ref -2–2)
BDY SITE: ABNORMAL
BLACTX-M ISLT/SPM QL: NORMAL
BLAIMP ISLT/SPM QL: NORMAL
BLAKPC ISLT/SPM QL: NORMAL
BLAOXA-48-LIKE ISLT/SPM QL: NORMAL
BLAVIM ISLT/SPM QL: NORMAL
C PNEUM DNA NPH QL NAA+NON-PROBE: NOT DETECTED
CA-I BLDA-SCNC: 1.27 MMOL/L (ref 1.13–1.32)
CA-I BLDA-SCNC: 1.27 MMOL/L (ref 1.13–1.32)
CHLORIDE BLDA-SCNC: 95 MMOL/L (ref 98–107)
CHLORIDE BLDA-SCNC: 95 MMOL/L (ref 98–107)
CILIATED BAL QL: 1 %
D-LACTATE SERPL-SCNC: 1.2 MMOL/L
D-LACTATE SERPL-SCNC: 1.2 MMOL/L
E CLOAC COMP DNA BAL NAA+NON-PRB-NCNCRNG: NOT DETECTED
E COLI DNA BAL NAA+NON-PRB-NCNCRNG: NOT DETECTED
FLUAV SUBTYP SPEC NAA+PROBE: NOT DETECTED
FLUBV RNA ISLT QL NAA+PROBE: NOT DETECTED
GAS FLOW AIRWAY: 2 LPM
GLUCOSE BLDC GLUCOMTR-MCNC: 265 MG/DL (ref 70–99)
GLUCOSE BLDC GLUCOMTR-MCNC: 293 MG/DL (ref 70–99)
GLUCOSE BLDC GLUCOMTR-MCNC: 387 MG/DL (ref 65–99)
GLUCOSE BLDC GLUCOMTR-MCNC: 387 MG/DL (ref 65–99)
GLUCOSE BLDC GLUCOMTR-MCNC: 415 MG/DL (ref 70–99)
GP B STREP DNA BAL NAA+NON-PRB-NCNCRNG: NOT DETECTED
GRAM STN SPEC: NORMAL
GRAM STN SPEC: NORMAL
HADV DNA SPEC NAA+PROBE: NOT DETECTED
HAEM INFLU DNA BAL NAA+NON-PRB-NCNCRNG: NOT DETECTED
HCO3 BLDV-SCNC: 37.2 MMOL/L (ref 22–26)
HCOV RNA LOWER RESP QL NAA+NON-PROBE: NOT DETECTED
HGB BLDA-MCNC: 13.5 G/DL (ref 12–18)
HMPV RNA NPH QL NAA+NON-PROBE: NOT DETECTED
HPIV RNA LOWER RESP QL NAA+NON-PROBE: NOT DETECTED
INHALED O2 CONCENTRATION: 28 %
K AEROGENES DNA BAL NAA+NON-PRB-NCNCRNG: NOT DETECTED
K OXYTOCA DNA BAL NAA+NON-PRB-NCNCRNG: NOT DETECTED
K PNEU GRP DNA BAL NAA+NON-PRB-NCNCRNG: NOT DETECTED
L PNEUMO DNA LOWER RESP QL NAA+NON-PROBE: NOT DETECTED
LYMPHOCYTES NFR FLD MANUAL: 3 %
Lab: ABNORMAL
M CATARRHALIS DNA BAL NAA+NON-PRB-NCNCRNG: NOT DETECTED
M PNEUMO IGG SER IA-ACNC: NOT DETECTED
MACROPHAGE FLUID %: 3 %
MECA+MECC ISLT/SPM QL: NORMAL
MODALITY: ABNORMAL
NDM GENE: NORMAL
NEUTROPHILS NFR FLD MANUAL: 93 %
NIGHT BLUE STAIN TISS: NORMAL
NOTIFIED WHO: ABNORMAL
P AERUGINOSA DNA BAL NAA+NON-PRB-NCNCRNG: NOT DETECTED
PCO2 BLDV: 62.5 MM HG (ref 41–51)
PH BLDV: 7.38 PH UNITS (ref 7.31–7.41)
PO2 BLDV: 46.4 MM HG (ref 35–42)
POTASSIUM BLDA-SCNC: 4.7 MMOL/L (ref 3.5–5)
POTASSIUM BLDA-SCNC: 4.7 MMOL/L (ref 3.5–5)
PROTEUS SP DNA BAL NAA+NON-PRB-NCNCRNG: NOT DETECTED
RHINOVIRUS RNA SPEC NAA+PROBE: NOT DETECTED
RSV RNA NPH QL NAA+NON-PROBE: NOT DETECTED
S AUREUS DNA BAL NAA+NON-PRB-NCNCRNG: NOT DETECTED
S MARCESCENS DNA BAL NAA+NON-PRB-NCNCRNG: NOT DETECTED
S PNEUM DNA BAL NAA+NON-PRB-NCNCRNG: NOT DETECTED
S PYO DNA BAL NAA+NON-PRB-NCNCRNG: NOT DETECTED
SAO2 % BLDCOV: 79.6 % (ref 45–75)
SODIUM BLD-SCNC: 139 MMOL/L (ref 131–143)
SODIUM BLD-SCNC: 139 MMOL/L (ref 131–143)
VISUAL PRESENCE OF BLOOD: NORMAL

## 2025-04-22 PROCEDURE — 82948 REAGENT STRIP/BLOOD GLUCOSE: CPT

## 2025-04-22 PROCEDURE — 71045 X-RAY EXAM CHEST 1 VIEW: CPT

## 2025-04-22 PROCEDURE — 88342 IMHCHEM/IMCYTCHM 1ST ANTB: CPT | Performed by: INTERNAL MEDICINE

## 2025-04-22 PROCEDURE — 87071 CULTURE AEROBIC QUANT OTHER: CPT | Performed by: INTERNAL MEDICINE

## 2025-04-22 PROCEDURE — 80051 ELECTROLYTE PANEL: CPT

## 2025-04-22 PROCEDURE — 87102 FUNGUS ISOLATION CULTURE: CPT | Performed by: INTERNAL MEDICINE

## 2025-04-22 PROCEDURE — 83605 ASSAY OF LACTIC ACID: CPT

## 2025-04-22 PROCEDURE — 88173 CYTOPATH EVAL FNA REPORT: CPT | Performed by: INTERNAL MEDICINE

## 2025-04-22 PROCEDURE — 88305 TISSUE EXAM BY PATHOLOGIST: CPT | Performed by: INTERNAL MEDICINE

## 2025-04-22 PROCEDURE — 25010000002 MIDAZOLAM PER 1MG: Performed by: ANESTHESIOLOGY

## 2025-04-22 PROCEDURE — 88312 SPECIAL STAINS GROUP 1: CPT | Performed by: INTERNAL MEDICINE

## 2025-04-22 PROCEDURE — G0378 HOSPITAL OBSERVATION PER HR: HCPCS

## 2025-04-22 PROCEDURE — 87633 RESP VIRUS 12-25 TARGETS: CPT | Performed by: INTERNAL MEDICINE

## 2025-04-22 PROCEDURE — 82948 REAGENT STRIP/BLOOD GLUCOSE: CPT | Performed by: NURSE ANESTHETIST, CERTIFIED REGISTERED

## 2025-04-22 PROCEDURE — 88341 IMHCHEM/IMCYTCHM EA ADD ANTB: CPT | Performed by: INTERNAL MEDICINE

## 2025-04-22 PROCEDURE — 31654 BRONCH EBUS IVNTJ PERPH LES: CPT | Performed by: INTERNAL MEDICINE

## 2025-04-22 PROCEDURE — 31652 BRONCH EBUS SAMPLNG 1/2 NODE: CPT | Performed by: INTERNAL MEDICINE

## 2025-04-22 PROCEDURE — 87070 CULTURE OTHR SPECIMN AEROBIC: CPT | Performed by: INTERNAL MEDICINE

## 2025-04-22 PROCEDURE — 82803 BLOOD GASES ANY COMBINATION: CPT

## 2025-04-22 PROCEDURE — 25010000002 ONDANSETRON PER 1 MG: Performed by: NURSE ANESTHETIST, CERTIFIED REGISTERED

## 2025-04-22 PROCEDURE — 31645 BRNCHSC W/THER ASPIR 1ST: CPT | Performed by: INTERNAL MEDICINE

## 2025-04-22 PROCEDURE — 25810000003 LACTATED RINGERS PER 1000 ML: Performed by: ANESTHESIOLOGY

## 2025-04-22 PROCEDURE — 87206 SMEAR FLUORESCENT/ACID STAI: CPT | Performed by: INTERNAL MEDICINE

## 2025-04-22 PROCEDURE — 25010000002 PROPOFOL 10 MG/ML EMULSION: Performed by: NURSE ANESTHETIST, CERTIFIED REGISTERED

## 2025-04-22 PROCEDURE — 31624 DX BRONCHOSCOPE/LAVAGE: CPT | Performed by: INTERNAL MEDICINE

## 2025-04-22 PROCEDURE — 89051 BODY FLUID CELL COUNT: CPT | Performed by: INTERNAL MEDICINE

## 2025-04-22 PROCEDURE — 63710000001 INSULIN REGULAR HUMAN PER 5 UNITS: Performed by: NURSE ANESTHETIST, CERTIFIED REGISTERED

## 2025-04-22 PROCEDURE — 87116 MYCOBACTERIA CULTURE: CPT | Performed by: INTERNAL MEDICINE

## 2025-04-22 PROCEDURE — C1726 CATH, BAL DIL, NON-VASCULAR: HCPCS | Performed by: INTERNAL MEDICINE

## 2025-04-22 PROCEDURE — 76000 FLUOROSCOPY <1 HR PHYS/QHP: CPT

## 2025-04-22 PROCEDURE — 25010000002 LIDOCAINE PF 2% 2 % SOLUTION: Performed by: NURSE ANESTHETIST, CERTIFIED REGISTERED

## 2025-04-22 PROCEDURE — 88104 CYTOPATH FL NONGYN SMEARS: CPT | Performed by: INTERNAL MEDICINE

## 2025-04-22 PROCEDURE — 87205 SMEAR GRAM STAIN: CPT | Performed by: INTERNAL MEDICINE

## 2025-04-22 PROCEDURE — 25010000002 PHENYLEPHRINE HCL-NACL 1-0.9 MG/10ML-% SOLUTION PREFILLED SYRINGE: Performed by: NURSE ANESTHETIST, CERTIFIED REGISTERED

## 2025-04-22 PROCEDURE — 31628 BRONCHOSCOPY/LUNG BX EACH: CPT | Performed by: INTERNAL MEDICINE

## 2025-04-22 PROCEDURE — 31623 DX BRONCHOSCOPE/BRUSH: CPT | Performed by: INTERNAL MEDICINE

## 2025-04-22 PROCEDURE — 88108 CYTOPATH CONCENTRATE TECH: CPT | Performed by: INTERNAL MEDICINE

## 2025-04-22 PROCEDURE — 31627 NAVIGATIONAL BRONCHOSCOPY: CPT | Performed by: INTERNAL MEDICINE

## 2025-04-22 PROCEDURE — 82330 ASSAY OF CALCIUM: CPT

## 2025-04-22 PROCEDURE — 94799 UNLISTED PULMONARY SVC/PX: CPT

## 2025-04-22 PROCEDURE — 99223 1ST HOSP IP/OBS HIGH 75: CPT | Performed by: INTERNAL MEDICINE

## 2025-04-22 PROCEDURE — 31629 BRONCHOSCOPY/NEEDLE BX EACH: CPT | Performed by: INTERNAL MEDICINE

## 2025-04-22 PROCEDURE — 25010000002 SUGAMMADEX 200 MG/2ML SOLUTION: Performed by: NURSE ANESTHETIST, CERTIFIED REGISTERED

## 2025-04-22 RX ORDER — PROPOFOL 10 MG/ML
VIAL (ML) INTRAVENOUS AS NEEDED
Status: DISCONTINUED | OUTPATIENT
Start: 2025-04-22 | End: 2025-04-22 | Stop reason: SURG

## 2025-04-22 RX ORDER — IPRATROPIUM BROMIDE AND ALBUTEROL SULFATE 2.5; .5 MG/3ML; MG/3ML
3 SOLUTION RESPIRATORY (INHALATION) ONCE
Status: COMPLETED | OUTPATIENT
Start: 2025-04-22 | End: 2025-04-22

## 2025-04-22 RX ORDER — SODIUM CHLORIDE, SODIUM LACTATE, POTASSIUM CHLORIDE, CALCIUM CHLORIDE 600; 310; 30; 20 MG/100ML; MG/100ML; MG/100ML; MG/100ML
9 INJECTION, SOLUTION INTRAVENOUS CONTINUOUS PRN
Status: DISCONTINUED | OUTPATIENT
Start: 2025-04-22 | End: 2025-04-22 | Stop reason: HOSPADM

## 2025-04-22 RX ORDER — BUDESONIDE 0.5 MG/2ML
0.5 INHALANT ORAL
Status: DISCONTINUED | OUTPATIENT
Start: 2025-04-22 | End: 2025-04-22 | Stop reason: HOSPADM

## 2025-04-22 RX ORDER — ONDANSETRON 2 MG/ML
INJECTION INTRAMUSCULAR; INTRAVENOUS AS NEEDED
Status: DISCONTINUED | OUTPATIENT
Start: 2025-04-22 | End: 2025-04-22 | Stop reason: SURG

## 2025-04-22 RX ORDER — MIDAZOLAM HYDROCHLORIDE 2 MG/2ML
2 INJECTION, SOLUTION INTRAMUSCULAR; INTRAVENOUS ONCE
Status: COMPLETED | OUTPATIENT
Start: 2025-04-22 | End: 2025-04-22

## 2025-04-22 RX ORDER — SODIUM CHLORIDE 0.9 % (FLUSH) 0.9 %
10 SYRINGE (ML) INJECTION AS NEEDED
Status: DISCONTINUED | OUTPATIENT
Start: 2025-04-22 | End: 2025-04-22 | Stop reason: HOSPADM

## 2025-04-22 RX ORDER — ONDANSETRON 2 MG/ML
4 INJECTION INTRAMUSCULAR; INTRAVENOUS ONCE AS NEEDED
Status: DISCONTINUED | OUTPATIENT
Start: 2025-04-22 | End: 2025-04-22 | Stop reason: HOSPADM

## 2025-04-22 RX ORDER — SODIUM CHLORIDE 0.9 % (FLUSH) 0.9 %
10 SYRINGE (ML) INJECTION EVERY 12 HOURS SCHEDULED
Status: DISCONTINUED | OUTPATIENT
Start: 2025-04-22 | End: 2025-04-22 | Stop reason: HOSPADM

## 2025-04-22 RX ORDER — IPRATROPIUM BROMIDE AND ALBUTEROL SULFATE 2.5; .5 MG/3ML; MG/3ML
3 SOLUTION RESPIRATORY (INHALATION) EVERY 4 HOURS PRN
Status: DISCONTINUED | OUTPATIENT
Start: 2025-04-22 | End: 2025-04-22 | Stop reason: HOSPADM

## 2025-04-22 RX ORDER — FLUMAZENIL 0.1 MG/ML
INJECTION INTRAVENOUS
Status: COMPLETED
Start: 2025-04-22 | End: 2025-04-22

## 2025-04-22 RX ORDER — SODIUM CHLORIDE 9 MG/ML
40 INJECTION, SOLUTION INTRAVENOUS AS NEEDED
Status: DISCONTINUED | OUTPATIENT
Start: 2025-04-22 | End: 2025-04-22 | Stop reason: HOSPADM

## 2025-04-22 RX ORDER — ARFORMOTEROL TARTRATE 15 UG/2ML
15 SOLUTION RESPIRATORY (INHALATION)
Status: DISCONTINUED | OUTPATIENT
Start: 2025-04-22 | End: 2025-04-22 | Stop reason: HOSPADM

## 2025-04-22 RX ORDER — LIDOCAINE HYDROCHLORIDE 20 MG/ML
INJECTION, SOLUTION EPIDURAL; INFILTRATION; INTRACAUDAL; PERINEURAL AS NEEDED
Status: DISCONTINUED | OUTPATIENT
Start: 2025-04-22 | End: 2025-04-22 | Stop reason: SURG

## 2025-04-22 RX ORDER — PETROLATUM,WHITE
OINTMENT IN PACKET (GRAM) TOPICAL AS NEEDED
Status: DISCONTINUED | OUTPATIENT
Start: 2025-04-22 | End: 2025-04-22 | Stop reason: SURG

## 2025-04-22 RX ORDER — FLUMAZENIL 0.1 MG/ML
INJECTION INTRAVENOUS AS NEEDED
Status: DISCONTINUED | OUTPATIENT
Start: 2025-04-22 | End: 2025-04-22 | Stop reason: SURG

## 2025-04-22 RX ORDER — ROCURONIUM BROMIDE 10 MG/ML
INJECTION, SOLUTION INTRAVENOUS AS NEEDED
Status: DISCONTINUED | OUTPATIENT
Start: 2025-04-22 | End: 2025-04-22 | Stop reason: SURG

## 2025-04-22 RX ORDER — OXYCODONE HYDROCHLORIDE 5 MG/1
5 TABLET ORAL
Status: DISCONTINUED | OUTPATIENT
Start: 2025-04-22 | End: 2025-04-22 | Stop reason: HOSPADM

## 2025-04-22 RX ORDER — PHENYLEPHRINE HCL IN 0.9% NACL 1 MG/10 ML
SYRINGE (ML) INTRAVENOUS AS NEEDED
Status: DISCONTINUED | OUTPATIENT
Start: 2025-04-22 | End: 2025-04-22 | Stop reason: SURG

## 2025-04-22 RX ADMIN — Medication 1 PACKAGE: at 08:17

## 2025-04-22 RX ADMIN — ROCURONIUM BROMIDE 35 MG: 10 INJECTION, SOLUTION INTRAVENOUS at 07:28

## 2025-04-22 RX ADMIN — IPRATROPIUM BROMIDE AND ALBUTEROL SULFATE 3 ML: .5; 3 SOLUTION RESPIRATORY (INHALATION) at 07:07

## 2025-04-22 RX ADMIN — SUGAMMADEX 200 MG: 100 INJECTION, SOLUTION INTRAVENOUS at 08:18

## 2025-04-22 RX ADMIN — Medication 100 MCG: at 07:45

## 2025-04-22 RX ADMIN — PROPOFOL 80 MG: 10 INJECTION, EMULSION INTRAVENOUS at 07:27

## 2025-04-22 RX ADMIN — FLUMAZENIL 0.05 MG: 0.1 INJECTION, SOLUTION INTRAVENOUS at 10:40

## 2025-04-22 RX ADMIN — LIDOCAINE HYDROCHLORIDE 50 MG: 20 INJECTION, SOLUTION INTRAVENOUS at 07:27

## 2025-04-22 RX ADMIN — MIDAZOLAM HYDROCHLORIDE 2 MG: 1 INJECTION, SOLUTION INTRAMUSCULAR; INTRAVENOUS at 07:09

## 2025-04-22 RX ADMIN — FLUMAZENIL 0.05 MG: 0.1 INJECTION, SOLUTION INTRAVENOUS at 10:33

## 2025-04-22 RX ADMIN — IPRATROPIUM BROMIDE AND ALBUTEROL SULFATE 3 ML: .5; 3 SOLUTION RESPIRATORY (INHALATION) at 10:02

## 2025-04-22 RX ADMIN — Medication 100 MCG: at 07:57

## 2025-04-22 RX ADMIN — Medication 100 MCG: at 07:27

## 2025-04-22 RX ADMIN — SODIUM CHLORIDE, POTASSIUM CHLORIDE, SODIUM LACTATE AND CALCIUM CHLORIDE 9 ML/HR: 600; 310; 30; 20 INJECTION, SOLUTION INTRAVENOUS at 07:08

## 2025-04-22 RX ADMIN — PROPOFOL 20 MG: 10 INJECTION, EMULSION INTRAVENOUS at 08:09

## 2025-04-22 RX ADMIN — FLUMAZENIL 0.05 MG: 0.1 INJECTION, SOLUTION INTRAVENOUS at 10:43

## 2025-04-22 RX ADMIN — Medication 100 MCG: at 08:04

## 2025-04-22 RX ADMIN — PROPOFOL 20 MG: 10 INJECTION, EMULSION INTRAVENOUS at 08:15

## 2025-04-22 RX ADMIN — FLUMAZENIL 0.05 MG: 0.1 INJECTION, SOLUTION INTRAVENOUS at 10:37

## 2025-04-22 RX ADMIN — PROPOFOL 20 MG: 10 INJECTION, EMULSION INTRAVENOUS at 07:48

## 2025-04-22 RX ADMIN — ONDANSETRON 4 MG: 2 INJECTION INTRAMUSCULAR; INTRAVENOUS at 07:40

## 2025-04-22 RX ADMIN — Medication 100 MCG: at 07:37

## 2025-04-22 RX ADMIN — INSULIN HUMAN 10 UNITS: 100 INJECTION, SOLUTION PARENTERAL at 07:35

## 2025-04-22 NOTE — ANESTHESIA PREPROCEDURE EVALUATION
Anesthesia Evaluation     Patient summary reviewed and Nursing notes reviewed   NPO Solid Status: > 6 hours  NPO Liquid Status: > 2 hours           Airway   Mallampati: III  TM distance: >3 FB  Neck ROM: limited  Possible difficult intubation  Comment: S/p radiation to larynx last year- possible difficult intubation  Dental    (+) edentulous    Pulmonary    (+) pulmonary embolism, a smoker Current, Abstained day of surgery, COPD (proep Duoneb clear pre neb.PRN inhaler use- receiving nebs on floor) mild, asthma,home oxygen (PRN), shortness of breath, sleep apnea, decreased breath sounds    ROS comment: COVID positive  Cardiovascular   Exercise tolerance: poor (<4 METS)    ECG reviewed  PT is on anticoagulation therapy  Rhythm: regular  Rate: normal    (+) hypertension, valvular problems/murmurs (s/p TAVR) AS, past MI  >12 months, CAD, CHF Systolic <55%, murmur, PVD (no plavix for months)    ROS comment: TTE 3/25:  Echocardiogram Findings    Left Ventricle Left ventricular ejection fraction appears to be 61 - 65%.     Left ventricular wall thickness is consistent with mild to moderate septal asymmetric hypertrophy. Left ventricular diastolic function is consistent with (grade I) impaired relaxation.  Right Ventricle Normal right ventricular cavity size and systolic function noted.  Left Atrium The left atrial cavity is mildly dilated. Left atrial volume is normal.  Right Atrium Normal right atrial cavity size noted.  Aortic Valve There is a prosthetic aortic valve of unknown type present. The pressure gradients may be mildly elevated, consistent with mild stenosis.  Mitral Valve Appeared calcified with mild restriction of cusp mobility  There was significant mitral annular calcification.  Moderate to severe regurgitation was noted.  Tricuspid Valve Mild tricuspid valve regurgitation is present. Estimated right ventricular systolic pressure from tricuspid regurgitation is normal (<35 mmHg). Mildly sclerotic.  There  was no apparent stenosis.  Pulmonic Valve The pulmonic valve is not well visualized.  Greater Vessels No dilation of the aortic root is present.  Pericardium There is a trivial pericardial effusion.  Aortic Valve Measurements    Stenosis  LVOT diam   1.8 cm      LV V1 max   94.8 cm/sec      LV V1 max PG   3.6 mmHg      LV V1 mean PG   2 mmHg      LV V1 VTI   15.6 cm      Ao pk mel   269 cm/sec         Stenosis  Ao max PG   28.9 mmHg      Ao mean PG   18 mmHg      Ao V2 VTI   50.1 cm      ANDREA(I,D)   0.79 cm2      Dimensionless Index   0.31 (DI)                  Neuro/Psych  (+) numbness, psychiatric history Anxiety, Depression and PTSD  GI/Hepatic/Renal/Endo    (+) hiatal hernia, GERD poorly controlled, liver disease (hepatic steatosis), renal disease- CRI, diabetes mellitus (no current meds) type 2    Musculoskeletal     (+) back pain, neck pain  Abdominal    Substance History      OB/GYN          Other   arthritis,   history of cancer (laryngeal, hodgkins) active    ROS/Med Hx Other: Last dose Plavix long time     Hx laryngeal CA, here for ion bronch    EKG 12/07/24: HR 85,   Sinus rhythm  Left atrial enlargement  Nonspecific intraventricular conduction delay  Anteroseptal infarct, age indeterminate  Abnormal T, consider ischemia, lateral leads  ST elevation, consider inferior injury    ECHO 05/26/23:   ·  Left ventricular ejection fraction appears to be 46 - 50%.  ·  Left ventricular wall thickness is consistent with mild concentric hypertrophy.  ·  Left ventricular diastolic function was indeterminate.  ·  There is a TAVR valve present.  ·  Moderate to severe tricuspid valve regurgitation is present.  ·  Estimated right ventricular systolic pressure from tricuspid regurgitation is mildly elevated (35-45 mmHg).     There were no apparent intracardiac masses, vegetations or thrombi.    PET scan 09/10/24:   IMPRESSION:  No definite abnormal activity is seen in the neck to suggest recurrent disease. No abnormal  "adenopathy. There may be post radiation changes as seen on CT neck 7/20/2024 and PET scan 5/6/2024.     Compared with chest CT 8/22/2024, there has been near complete resolution in the right middle lobe and right lower lobe pneumonia.      There are several new nodules and nodular densities seen in the right lower lobe measuring up to about 0.8 cm on image 166. These are not hypermetabolic and were not seen on 7/20/2024. There may be sequela from previous pneumonia. Recommend continued   follow-up.     No other definite abnormal activity.    Has been off Plavix \"a long time\"                        Anesthesia Plan    ASA 4     general   total IV anesthesia  (Use/have video DL at bedside  Careful induction with mild aortic stenosis    Airway risks discussed, post op respiratory issues discussed.  Preop neb given    Patient understands anesthesia not responsible for dental damage.      Discussed risks with pt including aspiration, allergic reactions, apnea, advanced airway placement. Pt verbalized understanding. All questions answered.   )  intravenous induction     Anesthetic plan, risks, benefits, and alternatives have been provided, discussed and informed consent has been obtained with: patient and child.  Pre-procedure education provided  Plan discussed with CRNA.        CODE STATUS:           "

## 2025-04-22 NOTE — DISCHARGE SUMMARY
Date of Discharge:  4/22/2025    Discharge Diagnosis:   Lethargy and lung nodule    Problem List:  Active Hospital Problems    Diagnosis  POA    **Hypercapnia [R06.89]  Yes    Abnormal chest CT [R93.89]  Unknown    Lung nodule [R91.1]  Yes      Resolved Hospital Problems   No resolved problems to display.       Presenting Problem/History of Present Illness  Abnormal chest CT [R93.89]  Lung nodule [R91.1]  Hypercapnia [R06.89]    Patient had biopsy of lung nodule this morning.  Took her Xanax beforehand and has a Dilaudid pain pump.  Afterwards she would not wake up.  ABG was compensated.  Had her on CPAP.  Gave her a few hours and she was still lethargic so was admitting her to observation.  She did not want to be admitted to observation show she decided to leave [place].      Procedures Performed    Procedure(s):  BRONCHOSCOPY WITH ION ROBOT: REBUS, EBUS, NEEDLE ASPIRATES, BIOPSIES, BRUSHINGS, BAL, WASHINGS: insertion of lighted robot navigated instrument to view inside the lung  -------------------       Consults:   Consults       Date and Time Order Name Status Description    4/22/2025 11:33 AM Inpatient Pulmonology Consult Completed     3/29/2025  8:20 AM Inpatient Pulmonology Consult Completed             Condition on Discharge: Lethargic    Vital Signs  Temp:  [97 °F (36.1 °C)-98 °F (36.7 °C)] 97.3 °F (36.3 °C)  Heart Rate:  [] 105  Resp:  [14-20] 18  BP: ()/(63-96) 105/78    Discharge Disposition  Home or Self Care    Discharge Medications     Discharge Medications        Continue These Medications        Instructions Start Date   albuterol sulfate  (90 Base) MCG/ACT inhaler  Commonly known as: PROVENTIL HFA;VENTOLIN HFA;PROAIR HFA   2 puffs, Inhalation, Every 4 Hours PRN      ALPRAZolam 0.25 MG tablet  Commonly known as: XANAX   0.25 mg, 3 times daily      arformoterol 15 MCG/2ML nebulizer solution  Commonly known as: Brovana   15 mcg, Nebulization, 2 Times Daily - RT      budesonide 0.5  MG/2ML nebulizer solution  Commonly known as: Pulmicort   0.5 mg, Nebulization, 2 Times Daily - RT, Rinse mouth out after each use      famotidine 40 MG tablet  Commonly known as: Pepcid   40 mg, Oral, Every Night at Bedtime      furosemide 20 MG tablet  Commonly known as: LASIX   20 mg, Daily      Insulin Lispro (1 Unit Dial) 100 UNIT/ML solution pen-injector  Commonly known as: HUMALOG   Inject 14 Units under the skin into the appropriate area as directed Daily.      ipratropium-albuterol 0.5-2.5 mg/3 ml nebulizer  Commonly known as: DUO-NEB   3 mL, Nebulization, 4 Times Daily PRN      loperamide 2 MG capsule  Commonly known as: IMODIUM   2 mg, Oral, 4 Times Daily PRN      metoprolol succinate XL 25 MG 24 hr tablet  Commonly known as: TOPROL-XL   25 mg, Oral, Every Night at Bedtime      mupirocin 2 % ointment  Commonly known as: BACTROBAN   1 Application, Topical, 3 Times Daily      Narcan 4 MG/0.1ML nasal spray  Generic drug: naloxone   Call 911. Don't prime. [place] in 1 nostril for overdose. Repeat in 2-3 minutes in other nostril if no or minimal breathing/responsiveness.      ondansetron ODT 8 MG disintegrating tablet  Commonly known as: ZOFRAN-ODT   Place 1 tablet on the tongue Daily As Needed.      pain patient supplied pump   Continuous      PARoxetine 20 MG tablet  Commonly known as: PAXIL   Take 1 tablet by mouth Every Morning.      predniSONE 20 MG tablet  Commonly known as: DELTASONE   40 mg, Oral, 2 Times Daily      Systane 0.4-0.3 % solution ophthalmic solution (artificial tears)  Generic drug: polyethyl glycol-propyl glycol   Administer 1-2 drops to both eyes 4 (Four) Times a Day As Needed.      Tylenol 325 MG tablet  Generic drug: acetaminophen   325 mg, Every 6 Hours PRN               Discharge Diet       Activity at Discharge      Follow-up Appointments  Future Appointments   Date Time Provider Department Center   4/23/2025  2:45 PM [facility]   5/22/2025 10:00 AM  Real Hernandez MD [facility]         Test Results Pending at Discharge  Pending Labs       Order Current Status    Non-gynecologic Cytology Collected (04/22/25 0754)    Tissue Pathology Exam Collected (04/22/25 0801)    Fungus Culture - Lavage, Lung, Right Lower Lobe In process    Fungus Culture - Wash, Bronchus In process    Pneumonia Panel - Lavage, Lung, Right Lower Lobe In process    AFB Culture - Lavage, Lung, Right Lower Lobe Preliminary result    AFB Culture - Wash, Bronchus Preliminary result    BAL Culture, Quantitative - Lavage, Lung, Right Lower Lobe Preliminary result    Respiratory Culture - Wash, Bronchus Preliminary result            Enzo Segal MD    Time: Discharge 3 min

## 2025-04-22 NOTE — INTERVAL H&P NOTE
Will take patient for robotic navigational bronchoscopy as well as bronchoscopy with endobronchial ultrasound/fine-needle aspiration. I have discussed the benefits vs risks of the procedure with the patient including pneumothorax, hemothorax, bleeding, hypoxia, required mechanical ventilation and death. The patient recognizes these findings, acknowledges these findings and is agreeable to the procedure.      H&P reviewed. The patient was examined and there are no changes to the H&P.      Electronically signed by Enzo Segal MD, 04/22/25, 6:57 AM EDT.

## 2025-04-22 NOTE — ANESTHESIA POSTPROCEDURE EVALUATION
Patient: Isha Llanes    Procedure Summary       Date: 04/22/25 Room / Location: [facility] OR 01 / [facility] MAIN OR    Anesthesia Start: 0722 Anesthesia Stop: 0838    Procedure: BRONCHOSCOPY WITH ION ROBOT: REBUS, EBUS, NEEDLE ASPIRATES, BIOPSIES, BRUSHINGS, BAL, WASHINGS: insertion of lighted robot navigated instrument to view inside the lung (Bronchus) Diagnosis:       Abnormal chest CT      Lung nodule      (Abnormal chest CT [R93.89])      (Lung nodule [R91.1])    Surgeons: Enzo Segal MD Provider: Enzo Fitch MD    Anesthesia Type: general ASA Status: 4            Anesthesia Type: general    Vitals  Vitals Value Taken Time   /85 04/22/25 09:15   Temp 36.1 °C (97 °F) 04/22/25 09:15   Pulse 104 04/22/25 09:18   Resp 18 04/22/25 09:10   SpO2 95 % 04/22/25 09:18   Vitals shown include unfiled device data.        Post Anesthesia Care and Evaluation    Patient location during evaluation: bedside  Patient participation: complete - patient participated  Level of consciousness: responsive to physical stimuli, responsive to painful stimuli and sleepy but conscious  Pain management: adequate    Airway patency: patent  Anesthetic complications: No anesthetic complications  PONV Status: none  Cardiovascular status: acceptable  Respiratory status: acceptable, BIPAP, spontaneous ventilation and nonlabored ventilation  Hydration status: acceptable    Comments: An Anesthesiologist personally participated in the most demanding procedures (including induction and emergence if applicable) in the anesthesia plan, monitored the course of anesthesia administration at frequent intervals and remained physically present and available for immediate diagnosis and treatment of emergencies.    Pt with continued somnelance in pacu.   Abg sent 7.38/62/hc03 35, flumazinal given without improvemet.  Sats 90-94% on Ra.  Due to continued somnelance elected to try bipap and return to phase 1 pacu.  Pt aroused more and  suggested Dr Segal admit for observation due to increased sleepiness.  Hes managing pt further and will admit.

## 2025-04-22 NOTE — OP NOTE
Robotic navigational assisted bronchoscopy with radial probe endobronchial ultrasound, transbronchial needle aspiration, transbronchial lung biopsies, brushings, bronchoalveolar lavage, bronchial washings.  Bronchoscopy with linear endobronchial ultrasound/fine-needle aspiration.  Bronchoscopy with clearance of airways    Pre-Operative Diagnosis: Lung nodule     Post-Operative Diagnosis: Same     Timeout performed     Anesthesia: General anesthesia     Procedure Details: The patient was consented for the procedure with all risk and benefit of the procedure explained in detail.  She was given the opportunity to ask questions and all concerns were answered. The bronchoscope was inserted into the main airway via the endotracheal tube. An anatomical survey was done of the main airways and the subsegmental bronchus.      Findings: First, using fiberoptic bronchoscope airway inspection was performed.  Endotracheal tube was in adequate position in the mid thoracic trachea.  The trachea was normal in caliber and had no mucosal abnormalities. The left tracheobronchial tree appeared anatomically normal with no mucosal abnormalities.  There was some thick cloudy secretions in the left and right lower lobe bronchi.  These were suctioned and removed.  The right tracheobronchial tree appeared anatomically normal with no mucosal abnormalities.  All airways were evaluated and cleared.    Next the fiberoptic bronchoscope was removed and Ion robotic navigational bronchoscope was inserted into the endotracheal tube.  Using navigational guidance, we approach to the lung in the right lower lobe of lung.  Identification of right lower lobe lung nodule and appropriate positioning was confirmed via radial probe EBUS and fluoroscopy.  Under fluoroscopic guidance, transbronchial needle aspiration was performed at the lung in the right lower lobe of the lung.  Radial probe EBUS was then inserted to again confirm appropriate position under  fluoroscopic guidance.  Transbronchial lung biopsies were performed under fluoroscopic guidance at the lung nodule in the right lower lobe of the lung. Radial probe EBUS was then inserted to again confirm appropriate position under fluoroscopic guidance.  Brushings  were performed under fluoroscopic guidance at the lung nodule of the right lower lobe bronchus. A bronchoalveolar lavage was performed using 20 mL aliquots of normal saline  instilled into the right lower lobe bronchus then aspirated back. There was 5 mL cloudy fluid in return.  Bronchial washings were collected.    After Ion navigational bronchoscope was removed, linear endobronchial ultrasound was inserted through the endotracheal tube. Using ultrasound, the following lymph nodes were identified and found to be adequate for samplinR.  These lymph nodes appeared normal in size and shape. Using endobronchial ultrasound, fine-needle aspiration was performed 11R. 4 samples were taken from the following sites: 11R.      Findings:  Mucous plugging left and right lower lobe bronchi with thick cloudy secretions  Identification of right lower lobe lung nodule using robotic navigation and radial probe ultrasound  Normal-appearing 11R lymph node under ultrasound guidance     Estimated Blood Loss: Less than 10 mL     Specimens:  Transbronchial needle aspiration lung nodule right lower lobe lung  Transbronchial lung biopsies lung nodule right lower lobe lung  Brushings lung nodule right lower lobe of lung  Fine-needle aspiration 11R lymph node under ultrasound guidance  Bronchoalveolar lavage right lower lobe bronchus  Bronchial washings     Complications: None; patient tolerated the procedure well.     Disposition: Stable to discharge home.  Follow-up test results.     Patient tolerated the procedure well.    Electronically signed by Enzo Segal MD, 25, 8:33 AM EDT.

## 2025-04-22 NOTE — CONSULTS
Pulmonary / Critical Care Consult Note      Patient Name: Isha Llanes  : 1965  MRN: [id number]  Primary Care Physician:  Virgil Salas MD  Referring Physician: Enzo Segal MD  Date of admission: 2025    Subjective   Subjective     Reason for Consult/ Chief Complaint:   Lethargy after bronchoscopy    HPI:  Isha Llanes is a 59 y.o. 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  Biopsy results pending.  Afterwards patient was very lethargic and difficult to arouse.  ABG showed a chronically compensated hypercapnia.  Patient does have a indwelling Dilaudid pain pump and daughter reports that she did take Xanax this morning prior to coming to the OR for procedure.  Is on CPAP and slowly waking up.  She is lethargic but arousable.  Has had some shortness of breath and coughing after bronchoscopy. Patient denies any fever, chills, nausea, vomiting, or being around any sick contacts    Personal History     Past Medical History:   Diagnosis Date    Anesthesia     REPORTS HAS GOT REAL EMOTIONAL AND HAS BECOME ANGRY BEFORE    Anxiety     Aortic stenosis, severe     HAS BIO VALVE REPLACED    Arthritis     Asthma     Back pain     Blood clotting disorder     test to find out what type    Cancer     CHF (congestive heart failure)     Chronic low back pain with sciatica     Chronic pain disorder     COPD (chronic obstructive pulmonary disease)     Coronary artery disease     looking to follow with Cardio    Diabetes mellitus     TYPE 2    Dyspnea on effort     Fatty liver     GERD (gastroesophageal reflux disease)     H/O lumpectomy     H/O: hysterectomy     Hiatal hernia     History of degenerative disc disease     History of kidney stones     History of pulmonary embolus (PE)     History of  transfusion     AS A CHILD NO TRANSFUSION REACTION    Hodgkin's lymphoma     5/19/23  5 YEARS SINCE LAST CHEMO TX    Hypertension     Immobility     Lupus     Nausea vomiting and diarrhea 03/07/2024    Panic disorder     Pelvic pain     Peripheral neuropathy     Personal history of DVT (deep vein thrombosis)     over 20 years    PONV (postoperative nausea and vomiting)     Presence of intrathecal pump     PTSD (post-traumatic stress disorder)     Sacroiliac joint disease     SI (sacroiliac) joint inflammation     Venous insufficiency        Past Surgical History:   Procedure Laterality Date    ABDOMINAL SURGERY      BACK SURGERY      SPINAL FUSION     BACK SURGERY      BLADDER REPAIR      CARDIAC CATHETERIZATION N/A 03/06/2019    Procedure: Valvuloplasty;  Surgeon: Wayne Johnson MD;  Location: [facility] INVASIVE LOCATION;  Service: Cardiology    CARDIAC CATHETERIZATION      CARDIAC CATHETERIZATION Left 05/31/2023    Procedure: Cardiac Catheterization/Vascular Study;  Surgeon: Poncho Reid MD;  Location: [facility] CATH INVASIVE LOCATION;  Service: Cardiovascular;  Laterality: Left;    CARDIAC SURGERY      Mechanical valve    CARDIAC VALVE REPLACEMENT  2021    CHOLECYSTECTOMY      COLONOSCOPY N/A 12/29/2021    Procedure: COLONOSCOPY;  Surgeon: Karine Orlando MD;  Location: [facility] ENDOSCOPY;  Service: Gastroenterology;  Laterality: N/A;  POOR PREP    COLONOSCOPY N/A 04/13/2022    Procedure: COLONOSCOPY WITH POLYPECTOMY;  Surgeon: Karine Orlando MD;  Location: [facility] ENDOSCOPY;  Service: Gastroenterology;  Laterality: N/A;  COLON POLYP, HEMORRHOIDS, POOR PREP    COLONOSCOPY      DIRECT LARYNGOSCOPY, ESOPHAGOSCOPY, BRONCHOSCOPY N/A 05/22/2023    Procedure: DIRECT LARYNGOSCOPY WITH BIOPSIES , ESOPHAGOSCOPY, BRONCHOSCOPY;  Surgeon: Ronnie Mcgarry MD;  [place] [facility] [place] [facility]  Service: ENT;  Laterality: N/A;    ENDOSCOPY N/A 12/29/2021    Procedure: ESOPHAGOGASTRODUODENOSCOPY;   Surgeon: Karine Orlando MD;  Location: [facility] ENDOSCOPY;  Service: Gastroenterology;  Laterality: N/A;  ESOPHAGITIS, GASTRITIS, HIATAL HERNIA    ENDOSCOPY      ENDOSCOPY N/A 04/16/2024    Procedure: ESOPHAGOGASTRODUODENOSCOPY WITH BIOPSIES;  Surgeon: Karine Orlando MD;  Location: [facility] ENDOSCOPY;  Service: Gastroenterology;  Laterality: N/A;  ESOPHAGITIS, HIATAL HERNIA    ENDOSCOPY W/ PEG TUBE PLACEMENT N/A 12/10/2024    Procedure: ESOPHAGOGASTRODUODENOSCOPY WITH PERCUTANEOUS ENDOSCOPIC GASTROSTOMY TUBE INSERTION WITH ANESTHESIA;  Surgeon: Rodger Ortega MD;  Location: [facility] ENDOSCOPY;  Service: Gastroenterology;  Laterality: N/A;  PEG TUBE INSERTION    HYSTERECTOMY      LYMPHADENECTOMY      PAIN PUMP INSERTION/REVISION N/A 10/18/2019    Procedure: PAIN PUMP INSERTION [facility] 10-18-19 [place];  Surgeon: Horace Rios MD;  Location: [facility];  Service: Pain Management    PAIN PUMP INSERTION/REVISION N/A 11/23/2020    Procedure: pain pump removal;  Surgeon: Horace Rios MD;  Location: [facility];  Service: Pain Management;  Laterality: N/A;    PEG TUBE INSERTION N/A 07/26/2023    Procedure: PERCUTANEOUS ENDOSCOPIC GASTROSTOMY TUBE INSERTION;  Surgeon: Kody Mercer MD;  Location: [facility] ENDOSCOPY;  Service: General;  Laterality: N/A;  SUCCESSFUL PEG TUBE PLACE PLACEMENT    PORTACATH PLACEMENT      IN LEFT CHEST REPORTS THAT IT IS NOT FUNCTIONING    SKIN BIOPSY      TONSILLECTOMY      TUBAL ABDOMINAL LIGATION      TUMOR REMOVAL      vaginal /anal area       Family History: family history includes ADD / ADHD in her brother, granddaughter, grandson, and nephew; Anxiety disorder in her mother; Bipolar disorder in her sister; Depression in her sister; Heart failure in her mother; Pancreatic cancer in her paternal grandmother and sister; Prostate cancer in her father; Thyroid cancer in her maternal grandmother. Otherwise pertinent FHx was reviewed and not pertinent to  current issue.    Social History:  reports that she has been smoking cigarettes. She started smoking about 46 years ago. She has a 92.6 pack-year smoking history. She has been exposed to tobacco smoke. She has never used smokeless tobacco. She reports that she does not drink alcohol and does not use drugs.    Home Medications:  ALPRAZolam, Insulin Lispro (1 Unit Dial), PARoxetine, acetaminophen, albuterol sulfate HFA, arformoterol, budesonide, famotidine, furosemide, ipratropium-albuterol, loperamide, metoprolol succinate XL, mupirocin, naloxone, ondansetron ODT, pain, polyethyl glycol-propyl glycol, and predniSONE    Allergies:  Allergies   Allergen Reactions    Amoxicillin Shortness Of Breath     Has tolerated Cefazolin, Ceftriax[facility], [facility]    Ceclor [Cefaclor] Shortness Of Breath     Has tolerated Cefazolin, Ceftriaxone, and Cefepime -[facility] Monte[facility], [facility]      Penicillins Shortness Of Breath     Has tolerated Cefazolin, Ceftriaxone, and [facility]fepime [facility]    Sulfa Antibiotics Rash    Valium [Diazepam] Mental Status Change     DEPRESSED    Ambien [Zolpidem] Mental Status Change    Aspirin GI Intolerance    Ativan [Lorazepam] Mental Status Change    Benadryl [Diphenhydramine] Anxiety     AND MAKES HER HYPER    Biaxin [Clarithromycin] Unknown - Low Severity    Cephalexin Unknown - Low Severity    Clindamycin Unknown - Low Severity    Compazine [Prochlorperazine Edisylate] Rash    Contrast Dye (Echo Or Unknown Ct/Mr) Other (See Comments)     Caused pain in her arm    Doxycycline Rash    Nsaids GI Intolerance    Phenergan [Promethazine Hcl] GI Intolerance    Promethazine GI Intolerance    Vancomycin Itching       Objective    Objective     Vitals:   Temp:  [97 °F (36.1 °C)-98 °F (36.7 °C)] 97 °F (36.1 °C)  Heart Rate:  [] 105  Resp:  [14-20] 15  BP: (106-132)/(71-96) 106/85  Flow (L/min) (Oxygen Therapy):  [0-6] 2    Physical Exam:  Vital Signs Reviewed   General:   Chronically ill-appearing female, lying in bed, lethargic but arousable  HEENT:  PERRL, EOMI.  OP, nares clear  Chest:  good aeration, diminished breath sounds with scattered rhonchi right greater than left, on CPAP  CV: RRR, no MGR, pulses 2+, equal.  Abd:  Soft, NT, ND, + BS, no HSM, PEG tube  EXT:  no clubbing, no cyanosis, no edema, no joint tenderness  Neuro:  A&Ox3 lethargic but arousable,, CN grossly intact, no focal deficits.  Skin: No rashes or lesions noted      Result Review    Result Review:  I have personally reviewed the results from the time of this admission to 4/22/2025 12:02 EDT and agree with these findings:  [x]  Laboratory  [x]  Microbiology  [x]  Radiology  [x]  EKG/Telemetry   [x]  Cardiology/Vascular   []  Pathology  []  Old records  []  Other:  Most notable findings include:   Chest x-ray postoperatively with no pneumothorax  ABG 7.38/62/46/37  Glucose greater than 400  BAL neutrophil predominant    Results for orders placed during the hospital encounter of 03/28/25    Adult Transthoracic Echo Limited W/ Cont if Necessary Per Protocol    Interpretation Summary    Left ventricular ejection fraction appears to be 61 - 65%.    Left ventricular wall thickness is consistent with mild to moderate septal asymmetric hypertrophy.    Left ventricular diastolic function is consistent with (grade I) impaired relaxation.    The left atrial cavity is mildly dilated.    There is a prosthetic aortic valve of unknown type present.    Estimated right ventricular systolic pressure from tricuspid regurgitation is normal (<35 mmHg).    There is a trivial pericardial effusion.    Chest x-ray personally showing chronic pulmonary scarring  ABG with chronic compensated respiratory failure      Assessment & Plan   Assessment / Plan     Active Hospital Problems:  Active Hospital Problems    Diagnosis     **Hypercapnia     Abnormal chest CT     Lung nodule      Impression:  Lethargy after bronchoscopy.  Suspect related  to anesthesia plus home Dilaudid pain pump plus patient taking Xanax this morning  COPD without exacerbation  Chronic compensated diastolic congestive heart failure  Enlarging right lower lobe lung nodule status post robotic navigational bronchoscopy today  Squamous cell carcinoma of the larynx status post radiation  Type 2 diabetes with hyperglycemia  History of Hodgkin's lymphoma  History of severe AS status post TAVR  History of MSSA cellulitis    Plan:  Continue BiPAP for now  Hold all sedating medications and patient should wake up  ABG personally reviewed showing chronic compensated hypercapnia  Robotic navigational bronchoscopy results pending  Resume home nebulizers  Wean O2 to keep SPO2 greater than 90%    VTE Prophylaxis:  Mechanical VTE prophylaxis orders are present.    There are no questions and answers to display.        Labs, imaging, notes and medications personally reviewed.  Discussed with Dr. Ac and anesthesia    Electronically signed by Enzo Segal MD, 04/22/25, 12:02 PM EDT.

## 2025-04-22 NOTE — PERIOPERATIVE NURSING NOTE
ADMINISTERED SCHEDULED MED, PT TOLERATED WELL. Received patient from PACU (0921) o2 sat in 80's unable to maintain erect position. Nc 2l applied. Encouraged patient to sit back and help open airway. Patient very sleepy unable to open eyes and sit back. Dr. Fitch called to visit patient. Minimal air movement in both lungs breathing treatment given at 1002 family at bedside 1020 discussed with MD the use of cpap/bipap at home daughter states that when her co2 goes up she has to go to the hospital 1025 RT called to do ABG. Dr. Fitch remained close to patient during this time. Blood gas ok, Dr. Fitch gave [employer] daughter stated that patient took home xanax this morning. Pt also has dilaudid pain pump. Pt transferred to pacu for continued monitoring and was place on bipap. Report given to Yessenia HOLT @ 2752. Pt waking up more upon exiting the room.

## 2025-04-22 NOTE — DISCHARGE INSTRUCTIONS
DISCHARGE INSTRUCTIONS  BRONCHOSCOPY/  ENDOBRONCHIAL ULTRASOUND      For your procedure you had:  General Anesthesia (you may have a sore throat for the first 24 hours)    You may experience dizziness, drowsiness, or lightheadedness for several hours following surgery/procedure.  Do not stay alone today or tonight.  Limit your activity for 24 hours.  You should not drive or operate machinery or drink alcohol for 24 hours or while you are taking pain medication.  You should not sign legally binding documents.  Resume your diet slowly.  Follow any special dietary instructions you may have been given by your doctor.  NOTIFY YOUR DOCTOR IF YOU EXPERIENCE ANY OF THE FOLLOWING:  Temperature greater than 101 degrees Fahrenheit  Shaking Chills  Redness or excessive drainage from incision  Nausea, vomiting and/or pain that is not controlled by prescribed medications  Increase in bleeding or bleeding that is excessive  Unable to urinate in 6 hours after surgery  If unable to reach your doctor, please go to the closest Emergency Room     Following your bronchoscopy, you may experience a mild sore throat or cough up small amounts of blood.  Extremes of either should be reported to your doctor.  If you have sudden shortness of breath, sense of urgency, chest pain, chest pressure that worsens over time or sharp chest pains that worsen with deep breaths or coughs go the ED or call 911.  Smokers are encouraged not to smoke for 6 to 8 hours after the procedure to decrease the risk of coughing and bleeding.  Nausea and vomiting can occur, but is not a common side effect of the procedure you have had today. If you experience any nausea or vomiting, take clear liquids for your next meal.  Coughing (slight dry cough to hacking cough) is completely expected for 3 to 4 days.  Pink/ blood tinged sputum is expected for several days ( or while coughing)  Notify MD or go to the ED if significant blood is found in the sputum.  Missing your  appointment/follow-up could lead to serious complications.    LAST DOSE OF PAIN MEDICATION:

## 2025-04-23 ENCOUNTER — READMISSION MANAGEMENT (OUTPATIENT)
Dept: CALL CENTER | Facility: HOSPITAL | Age: 60
End: 2025-04-23
Payer: COMMERCIAL

## 2025-04-23 NOTE — OUTREACH NOTE
Prep Survey      Flowsheet Row Responses   [facility] facility patient discharged from? [facility]   Is LACE score < 7 ? No   Eligibility Not Eligible   What are the reasons patient is not eligible? Other  [Per MD notes, pt left AMA]   Does the patient have one of the following disease processes/diagnoses(primary or secondary)? Other   Prep survey completed? Yes            Sarah CRUZ - Registered Nurse

## 2025-04-24 LAB
BACTERIA SPEC AEROBE CULT: NORMAL
BACTERIA SPEC RESP CULT: NORMAL
GRAM STN SPEC: NORMAL

## 2025-04-25 ENCOUNTER — OFFICE VISIT (OUTPATIENT)
Dept: PULMONOLOGY | Facility: CLINIC | Age: 60
End: 2025-04-25
Payer: COMMERCIAL

## 2025-04-25 VITALS
HEART RATE: 104 BPM | TEMPERATURE: 98.2 F | BODY MASS INDEX: 20.89 KG/M2 | SYSTOLIC BLOOD PRESSURE: 118 MMHG | DIASTOLIC BLOOD PRESSURE: 80 MMHG | OXYGEN SATURATION: 96 % | WEIGHT: 130 LBS | RESPIRATION RATE: 18 BRPM | HEIGHT: 66 IN

## 2025-04-25 DIAGNOSIS — C34.91 SQUAMOUS CELL CARCINOMA LUNG, RIGHT: Primary | ICD-10-CM

## 2025-04-25 DIAGNOSIS — C32.1 MALIGNANT NEOPLASM OF SUPRAGLOTTIS: ICD-10-CM

## 2025-04-25 DIAGNOSIS — J96.12 CHRONIC RESPIRATORY FAILURE WITH HYPOXIA AND HYPERCAPNIA: ICD-10-CM

## 2025-04-25 DIAGNOSIS — Z95.2 S/P TAVR (TRANSCATHETER AORTIC VALVE REPLACEMENT): ICD-10-CM

## 2025-04-25 DIAGNOSIS — Z72.0 TOBACCO ABUSE: ICD-10-CM

## 2025-04-25 DIAGNOSIS — J96.11 CHRONIC RESPIRATORY FAILURE WITH HYPOXIA AND HYPERCAPNIA: ICD-10-CM

## 2025-04-25 DIAGNOSIS — C32.9 SQUAMOUS CELL CARCINOMA OF LARYNX: ICD-10-CM

## 2025-04-25 DIAGNOSIS — Z71.6 ENCOUNTER FOR SMOKING CESSATION COUNSELING: ICD-10-CM

## 2025-04-25 DIAGNOSIS — I34.1 MVP (MITRAL VALVE PROLAPSE): ICD-10-CM

## 2025-04-25 DIAGNOSIS — J44.9 CHRONIC OBSTRUCTIVE PULMONARY DISEASE, UNSPECIFIED COPD TYPE: ICD-10-CM

## 2025-04-25 LAB
CYTO UR: NORMAL
CYTO UR: NORMAL
LAB AP CASE REPORT: NORMAL
LAB AP CASE REPORT: NORMAL
LAB AP CLINICAL INFORMATION: NORMAL
LAB AP CLINICAL INFORMATION: NORMAL
LAB AP DIAGNOSIS COMMENT: NORMAL
LAB AP DIAGNOSIS COMMENT: NORMAL
LAB AP SPECIAL STAINS: NORMAL
PATH REPORT.FINAL DX SPEC: NORMAL
PATH REPORT.FINAL DX SPEC: NORMAL
PATH REPORT.GROSS SPEC: NORMAL
PATH REPORT.GROSS SPEC: NORMAL

## 2025-04-25 PROCEDURE — 1159F MED LIST DOCD IN RCRD: CPT

## 2025-04-25 PROCEDURE — 99214 OFFICE O/P EST MOD 30 MIN: CPT

## 2025-04-25 PROCEDURE — 1160F RVW MEDS BY RX/DR IN RCRD: CPT

## 2025-04-25 NOTE — PROGRESS NOTES
Primary Care Provider  Virgil Salas MD   Referring Provider  No ref. provider found      Patient Complaint  Follow-up, Results (Pathology), Pain (In between shoulders blade), Cough, and Congestion      Subjective          Isha Llanes presents to [facility]    Patient or patient representative verbalized consent for the use of Ambient Listening during the visit with  LUZ Nelson for chart documentation. 4/25/2025  12:06 EDT    History of Presenting Illness  Isha Llanes is a 59 y.o. female patient of Dr. Segal with newly diagnosed NSCLC, COPD, history of throat cancer, history of Hodgkin's lymphoma, and tobacco use ongoing, here for follow-up.    History of Present Illness  Patient states she is doing okay since her last visit, here today to go over lung biopsy results.  She underwent Ion bronchoscopy with Dr. Segal 4/22/2025 for biopsy of an enlarging 1 cm right lower lobe lung nodule.  We discussed that her lung nodule biopsy was positive for squamous cell carcinoma.  Today, patient denies using any antibiotics or steroids for her lungs recently, denies any fevers or chills, no ER visits or hospitalizations for her breathing since she was last seen. She has a history of laryngeal cancer, which was treated with 35 rounds of radiation. A subsequent scan revealed no recurrence of cancer in this area. However, she experienced an abrupt loss of voice, prompting further investigation.  She has been recently diagnosed with anaplastic lymphoma. She is currently under the care of Dr. Manuelito Bain, a specialist in anaplastic lymphoma, and has an appointment scheduled with him next week. She is also receiving care at [facility], who had referred her to the anaplastic lymphoma specialist. She is considering treatment options for her lung cancer. She is also curious about the stage of her lung cancer and its location. She has requested  a copy of her test results.     Pulmonary Meds  Brovana  Pulmicort  Albuterol inhaler  DuoNebs    Pulmonary Equipment  Noninvasive ventilator      Family History   Problem Relation Age of Onset    Heart failure Mother     Anxiety disorder Mother     Prostate cancer Father     Depression Sister     Bipolar disorder Sister     Pancreatic cancer Sister     ADD / ADHD Brother     Thyroid cancer Maternal Grandmother     Pancreatic cancer Paternal Grandmother     ADD / ADHD Grandson     ADD / ADHD Granddaughter     ADD / ADHD Nephew     Malig Hyperthermia Neg Hx         Social History     Socioeconomic History    Marital status: Legally    Tobacco Use    Smoking status: Every Day     Current packs/day: 2.00     Average packs/day: 2.0 packs/day for 46.3 years (92.6 ttl pk-yrs)     Types: Cigarettes     Start date: 1979     Passive exposure: Current    Smokeless tobacco: Never    Tobacco comments:     Currently smoking 1.5 daily. 04--js   Vaping Use    Vaping status: Never Used   Substance and Sexual Activity    Alcohol use: Never    Drug use: Never    Sexual activity: Not Currently        Past Medical History:   Diagnosis Date    Anesthesia     REPORTS HAS GOT REAL EMOTIONAL AND HAS BECOME ANGRY BEFORE    Anxiety     Aortic stenosis, severe     HAS BIO VALVE REPLACED    Arthritis     Asthma     Back pain     Blood clotting disorder     test to find out what type    Cancer     CHF (congestive heart failure)     Chronic low back pain with sciatica     Chronic pain disorder     COPD (chronic obstructive pulmonary disease)     Coronary artery disease     looking to follow with Cardio    Diabetes mellitus     TYPE 2    Dyspnea on effort     Fatty liver     GERD (gastroesophageal reflux disease)     H/O lumpectomy     H/O: hysterectomy     Hiatal hernia     History of degenerative disc disease     History of kidney stones     History of pulmonary embolus (PE)     History of transfusion     AS A CHILD NO  TRANSFUSION REACTION    Hodgkin's lymphoma     5/19/23  5 YEARS SINCE LAST CHEMO TX    Hypertension     Immobility     Lupus     Nausea vomiting and diarrhea 03/07/2024    Panic disorder     Pelvic pain     Peripheral neuropathy     Personal history of DVT (deep vein thrombosis)     over 20 years    PONV (postoperative nausea and vomiting)     Presence of intrathecal pump     PTSD (post-traumatic stress disorder)     Sacroiliac joint disease     SI (sacroiliac) joint inflammation     Venous insufficiency           There is no immunization history on file for this patient.    Allergies   Allergen Reactions    Amoxicillin Shortness Of Breath     Has tolerated Cefazolin, Ceftriaxone, and Cefepime -Jermaine Melida, [facility]    Ceclor [Cefaclor] Shortness Of Breath     Has tolerated Cefazolin, Ceftriaxone, and Cefepime -Jermaine Melida, [facility]      Penicillins Shortness Of Breath     Has tolerated Cefazolin, Ceftriaxone, and Cefepime -Jermaine Melida, [facility]    Sulfa Antibiotics Rash    Valium [Diazepam] Mental Status Change     DEPRESSED    Ambien [Zolpidem] Mental Status Change    Aspirin GI Intolerance    Ativan [Lorazepam] Mental Status Change    Benadryl [Diphenhydramine] Anxiety     AND MAKES HER HYPER    Biaxin [Clarithromycin] Unknown - Low Severity    Cephalexin Unknown - Low Severity    Clindamycin Unknown - Low Severity    Compazine [Prochlorperazine Edisylate] Rash    Contrast Dye (Echo Or Unknown Ct/Mr) Other (See Comments)     Caused pain in her arm    Doxycycline Rash    Nsaids GI Intolerance    Phenergan [Promethazine Hcl] GI Intolerance    Promethazine GI Intolerance    Vancomycin Itching          Current Outpatient Medications:     acetaminophen (Tylenol) 325 MG tablet, Take 1 tablet by mouth Every 6 (Six) Hours As Needed for Mild Pain, Headache or Fever., Disp: , Rfl:     albuterol sulfate  (90 Base) MCG/ACT inhaler, Inhale 2 puffs Every 4 (Four) Hours As Needed for Wheezing., Disp: 18 g, Rfl: 2     ALPRAZolam (XANAX) 0.25 MG tablet, Take 1 tablet by mouth 3 times a day., Disp: , Rfl:     arformoterol (Brovana) 15 MCG/2ML nebulizer solution, Take 2 mL by nebulization 2 (Two) Times a Day for 30 days., Disp: 120 mL, Rfl: 5    budesonide (Pulmicort) 0.5 MG/2ML nebulizer solution, Take 2 mL by nebulization 2 (Two) Times a Day for 30 days. Rinse mouth out after each use, Disp: 120 mL, Rfl: 5    famotidine (Pepcid) 40 MG tablet, Take 1 tablet by mouth every night at bedtime., Disp: 90 tablet, Rfl: 1    furosemide (LASIX) 20 MG tablet, Take 1 tablet by mouth Daily., Disp: , Rfl:     Insulin Lispro, 1 Unit Dial, (HUMALOG) 100 UNIT/ML solution pen-injector, Inject 14 Units under the skin into the appropriate area as directed Daily., Disp: , Rfl:     ipratropium-albuterol (DUO-NEB) 0.5-2.5 mg/3 ml nebulizer, Take 3 mL by nebulization 4 (Four) Times a Day As Needed for Wheezing for up to 30 days., Disp: 360 mL, Rfl: 5    loperamide (IMODIUM) 2 MG capsule, Take 1 capsule by mouth 4 (Four) Times a Day As Needed for Diarrhea (2 with 1st episode of diarrhea and 1 with each additonal episode, max of 8 per day.)., Disp: 120 capsule, Rfl: 1    mupirocin (BACTROBAN) 2 % ointment, Apply 1 Application topically to the appropriate area as directed 3 (Three) Times a Day., Disp: 30 g, Rfl: 2    ondansetron ODT (ZOFRAN-ODT) 8 MG disintegrating tablet, Place 1 tablet on the tongue Daily As Needed., Disp: , Rfl:     pain patient supplied pump, by Intrathecal route Continuous. Type of Medication: Hydromorphone 15 mg/ml and Bupivacaine 0.5 mg/ml Pentech [phone] Provider:Dr. Boudreaux Provider number: [phone] Basal Dose: Hydromorphone 7.518 mg/day Bupivacaine 0.26671 mg/day Pump capacity: 40ml ( MAX of Hydromorphone 9.456 mg/ day; Bupivacaine 0.01823 mg /day) Bolus Dose:Hydromorphone 0.500 mg, Bupivacaine 0.21915 mg Max activations: 4 per day Lockout 3 hours. 1 Bolus per every 3 hours  Last refill-  03/06/2025 Alarm date- 05/05/2025  "Pump manufacture:[employer], Disp: , Rfl:     PARoxetine (PAXIL) 20 MG tablet, Take 1 tablet by mouth Every Morning., Disp: , Rfl:     predniSONE (DELTASONE) 20 MG tablet, Take 2 tablets by mouth 2 (Two) Times a Day. (Patient taking differently: Take 15 mg by mouth 2 (Two) Times a Day. 15mg in am 15mg in pm), Disp: 120 tablet, Rfl: 1    Systane 0.4-0.3 % solution ophthalmic solution (artificial tears), Administer 1-2 drops to both eyes 4 (Four) Times a Day As Needed., Disp: , Rfl:     metoprolol succinate XL (TOPROL-XL) 25 MG 24 hr tablet, Take 1 tablet by mouth every night at bedtime. (Patient not taking: Reported on 4/25/2025), Disp: 90 tablet, Rfl: 3    naloxone (NARCAN) 4 MG/0.1ML nasal spray, Call 911. Don't prime. [place] in 1 nostril for overdose. Repeat in 2-3 minutes in other nostril if no or minimal breathing/responsiveness. (Patient not taking: Reported on 4/25/2025), Disp: 2 each, Rfl: 0     Objective     Vital Signs:   /80 (BP Location: Right arm, Patient Position: Sitting, Cuff Size: Adult)   Pulse 104   Temp 98.2 °F (36.8 °C) (Tympanic)   Resp 18   Ht 167.6 cm (66\")   Wt 59 kg (130 lb)   SpO2 96% Comment: ROOM AIR  BMI 20.98 kg/m²     Physical Exam  Constitutional:       General: She is not in acute distress.     Appearance: Normal appearance. She is normal weight. She is not ill-appearing.   HENT:      Right Ear: Tympanic membrane and ear canal normal.      Left Ear: Tympanic membrane and ear canal normal.      Nose: Nose normal.      Mouth/Throat:      Mouth: Mucous membranes are moist.      Pharynx: Oropharynx is clear.   Cardiovascular:      Rate and Rhythm: Normal rate and regular rhythm.      Pulses: Normal pulses.      Heart sounds: Normal heart sounds.   Pulmonary:      Effort: Pulmonary effort is normal. No respiratory distress.      Breath sounds: Normal breath sounds. No stridor. No wheezing, rhonchi or rales.   Musculoskeletal:         General: No swelling. Normal range of " motion.      Cervical back: Normal range of motion and neck supple.      Right lower leg: No edema.      Left lower leg: No edema.   Skin:     General: Skin is warm and dry.   Neurological:      General: No focal deficit present.      Mental Status: She is alert and oriented to person, place, and time.      Motor: No weakness.   Psychiatric:         Mood and Affect: Mood normal.         Behavior: Behavior normal.          Result Review :   I have personally reviewed patient's labs and images.  I also reviewed Dr. Segal's bronchoscopy operative note 4/22/2025.    Results    -CT chest 3/29/2025 showed improvement in airspace disease seen in the right middle lobe.  There was also an enlarging right lower lobe nodule now measuring 1 cm, previously 8 mm in February 2025.  -Ion bronchoscopy 4/22/2025 no growth of cultures to date.  Tissue pathology of right lower lobe nodule positive for squamous cell carcinoma.  Cytology of right lower lobe brushing positive for squamous cell carcinoma.            Diagnoses and all orders for this visit:    1. Squamous cell carcinoma lung, right (Primary)    2. Chronic obstructive pulmonary disease, unspecified COPD type    3. Chronic respiratory failure with hypoxia and hypercapnia    4. Squamous cell carcinoma of larynx    5. Malignant neoplasm of supraglottis    6. MVP (mitral valve prolapse)    7. S/P TAVR (transcatheter aortic valve replacement)    8. Tobacco abuse    9. Encounter for smoking cessation counseling       Assessment & Plan  1. Squamous cell carcinoma of the lung.  The pathology results confirmed squamous cell carcinoma in the right lower lobe of the lung. The cancer is treatable, especially since it appears to be at least stage I. The nodule has shown growth from 8 mm in February to 1 cm by the end of March, indicating active disease progression. Cultures for fungus, bacteria, and viruses are being monitored for 6 weeks, with no growth reported so far. She will be  referred back to her oncologist for further evaluation and treatment planning. A copy of today's note and the biopsy results will be forwarded to Dr. Grayson Cline at [facility] as patient is receiving care there now.    2. Post-surgical wound care.  The surgical site on her left cheek looks good and does not appear infected. She reports itchiness and slight tenderness but no significant pain or heat.    3. COPD/chronic hypoxic respiratory failure.  Continue using Brovana and Pulmicort twice daily, reminded patient to rinse mouth after each use.  Continue using albuterol inhaler and DuoNeb treatments as needed.  Continue wearing noninvasive ventilator nightly and as needed during the day.    Follow up with Dr. Segal or Xochitl ESCOBAR in 3 months, may return sooner if needed    Smoking status: Reviewed  Vaccination status: Patient declines vaccines.  Patient is advised to continue to follow CDC recommendations such as social distancing wearing a mask and washing hands for at least 20 seconds.  Medications personally reviewed    Follow Up   No follow-ups on file.  Patient was given instructions and counseling regarding her condition or for health maintenance advice. Please see specific information pulled into the AVS if appropriate.

## 2025-04-27 LAB
FUNGUS WND CULT: NORMAL
FUNGUS WND CULT: NORMAL
MYCOBACTERIUM SPEC CULT: NORMAL
MYCOBACTERIUM SPEC CULT: NORMAL
NIGHT BLUE STAIN TISS: NORMAL

## 2025-04-29 LAB
FUNGUS WND CULT: NORMAL
MYCOBACTERIUM SPEC CULT: NORMAL
MYCOBACTERIUM SPEC CULT: NORMAL
NIGHT BLUE STAIN TISS: NORMAL

## 2025-05-02 LAB
CYTO UR: NORMAL
LAB AP CASE REPORT: NORMAL
LAB AP CLINICAL INFORMATION: NORMAL
LAB AP DIAGNOSIS COMMENT: NORMAL
LAB AP SPECIAL STAINS: NORMAL
PATH REPORT.ADDENDUM SPEC: NORMAL
PATH REPORT.FINAL DX SPEC: NORMAL
PATH REPORT.GROSS SPEC: NORMAL

## 2025-05-04 ENCOUNTER — APPOINTMENT (OUTPATIENT)
Dept: GENERAL RADIOLOGY | Facility: HOSPITAL | Age: 60
DRG: 190 | End: 2025-05-04
Payer: COMMERCIAL

## 2025-05-04 ENCOUNTER — APPOINTMENT (OUTPATIENT)
Dept: CT IMAGING | Facility: HOSPITAL | Age: 60
DRG: 190 | End: 2025-05-04
Payer: COMMERCIAL

## 2025-05-04 ENCOUNTER — HOSPITAL ENCOUNTER (INPATIENT)
Facility: HOSPITAL | Age: 60
LOS: 1 days | Discharge: HOME HEALTH | DRG: 190 | End: 2025-05-05
Attending: EMERGENCY MEDICINE | Admitting: INTERNAL MEDICINE
Payer: COMMERCIAL

## 2025-05-04 DIAGNOSIS — J44.1 COPD EXACERBATION: Primary | ICD-10-CM

## 2025-05-04 DIAGNOSIS — J96.12 CHRONIC RESPIRATORY FAILURE WITH HYPOXIA AND HYPERCAPNIA: ICD-10-CM

## 2025-05-04 DIAGNOSIS — R13.12 OROPHARYNGEAL DYSPHAGIA: ICD-10-CM

## 2025-05-04 DIAGNOSIS — J96.11 CHRONIC RESPIRATORY FAILURE WITH HYPOXIA AND HYPERCAPNIA: ICD-10-CM

## 2025-05-04 LAB
ACETONE BLD QL: NEGATIVE
ALBUMIN SERPL-MCNC: 3.9 G/DL (ref 3.5–5.2)
ALBUMIN/GLOB SERPL: 1.3 G/DL
ALP SERPL-CCNC: 118 U/L (ref 39–117)
ALT SERPL W P-5'-P-CCNC: 15 U/L (ref 1–33)
ANION GAP SERPL CALCULATED.3IONS-SCNC: 10.6 MMOL/L (ref 5–15)
ARTERIAL PATENCY WRIST A: POSITIVE
AST SERPL-CCNC: 15 U/L (ref 1–32)
ATMOSPHERIC PRESS: 736.9 MMHG
BASE EXCESS BLDA CALC-SCNC: 6.3 MMOL/L (ref -2–2)
BASOPHILS # BLD AUTO: 0.02 10*3/MM3 (ref 0–0.2)
BASOPHILS NFR BLD AUTO: 0.2 % (ref 0–1.5)
BDY SITE: ABNORMAL
BILIRUB SERPL-MCNC: 0.2 MG/DL (ref 0–1.2)
BILIRUB UR QL STRIP: NEGATIVE
BUN SERPL-MCNC: 15 MG/DL (ref 6–20)
BUN/CREAT SERPL: 27.8 (ref 7–25)
CA-I BLDA-SCNC: 1.22 MMOL/L (ref 1.13–1.32)
CA-I BLDA-SCNC: 1.22 MMOL/L (ref 1.13–1.32)
CALCIUM SPEC-SCNC: 9.2 MG/DL (ref 8.6–10.5)
CHLORIDE BLDA-SCNC: 96 MMOL/L (ref 98–107)
CHLORIDE BLDA-SCNC: 97 MMOL/L (ref 98–107)
CHLORIDE SERPL-SCNC: 94 MMOL/L (ref 98–107)
CLARITY UR: CLEAR
CO2 SERPL-SCNC: 28.4 MMOL/L (ref 22–29)
COLOR UR: YELLOW
CREAT SERPL-MCNC: 0.54 MG/DL (ref 0.57–1)
D-LACTATE SERPL-SCNC: 1.1 MMOL/L
D-LACTATE SERPL-SCNC: 1.4 MMOL/L
D-LACTATE SERPL-SCNC: 2 MMOL/L (ref 0.5–2)
DEPRECATED RDW RBC AUTO: 47.3 FL (ref 37–54)
EGFRCR SERPLBLD CKD-EPI 2021: 106.2 ML/MIN/1.73
EOSINOPHIL # BLD AUTO: 0.01 10*3/MM3 (ref 0–0.4)
EOSINOPHIL NFR BLD AUTO: 0.1 % (ref 0.3–6.2)
ERYTHROCYTE [DISTWIDTH] IN BLOOD BY AUTOMATED COUNT: 14.7 % (ref 12.3–15.4)
FLUAV RNA RESP QL NAA+PROBE: NOT DETECTED
FLUBV RNA RESP QL NAA+PROBE: NOT DETECTED
FUNGUS WND CULT: ABNORMAL
GAS FLOW AIRWAY: 2 LPM
GEN 5 1HR TROPONIN T REFLEX: 42 NG/L
GLOBULIN UR ELPH-MCNC: 3.1 GM/DL
GLUCOSE BLDC GLUCOMTR-MCNC: 212 MG/DL (ref 65–99)
GLUCOSE BLDC GLUCOMTR-MCNC: 217 MG/DL (ref 70–99)
GLUCOSE BLDC GLUCOMTR-MCNC: 223 MG/DL (ref 70–99)
GLUCOSE BLDC GLUCOMTR-MCNC: 226 MG/DL (ref 70–99)
GLUCOSE BLDC GLUCOMTR-MCNC: 261 MG/DL (ref 70–99)
GLUCOSE BLDC GLUCOMTR-MCNC: 285 MG/DL (ref 70–99)
GLUCOSE BLDC GLUCOMTR-MCNC: 368 MG/DL (ref 70–99)
GLUCOSE BLDC GLUCOMTR-MCNC: 410 MG/DL (ref 65–99)
GLUCOSE SERPL-MCNC: 382 MG/DL (ref 65–99)
GLUCOSE UR STRIP-MCNC: ABNORMAL MG/DL
HCO3 BLDA-SCNC: 32.5 MMOL/L (ref 22–26)
HCT VFR BLD AUTO: 46.6 % (ref 34–46.6)
HCT VFR BLD CALC: 44 % (ref 38–51)
HEMODILUTION: NO
HGB BLD-MCNC: 13.9 G/DL (ref 12–15.9)
HGB BLDA-MCNC: 14.8 G/DL (ref 12–18)
HGB UR QL STRIP.AUTO: NEGATIVE
HOLD SPECIMEN: NORMAL
HOLD SPECIMEN: NORMAL
IMM GRANULOCYTES # BLD AUTO: 0.11 10*3/MM3 (ref 0–0.05)
IMM GRANULOCYTES NFR BLD AUTO: 0.9 % (ref 0–0.5)
INHALED O2 CONCENTRATION: 28 %
KETONES UR QL STRIP: NEGATIVE
LEUKOCYTE ESTERASE UR QL STRIP.AUTO: NEGATIVE
LYMPHOCYTES # BLD AUTO: 0.82 10*3/MM3 (ref 0.7–3.1)
LYMPHOCYTES NFR BLD AUTO: 6.6 % (ref 19.6–45.3)
MCH RBC QN AUTO: 26.2 PG (ref 26.6–33)
MCHC RBC AUTO-ENTMCNC: 29.8 G/DL (ref 31.5–35.7)
MCV RBC AUTO: 87.9 FL (ref 79–97)
MODALITY: ABNORMAL
MONOCYTES # BLD AUTO: 0.48 10*3/MM3 (ref 0.1–0.9)
MONOCYTES NFR BLD AUTO: 3.9 % (ref 5–12)
NEUTROPHILS NFR BLD AUTO: 10.93 10*3/MM3 (ref 1.7–7)
NEUTROPHILS NFR BLD AUTO: 88.3 % (ref 42.7–76)
NITRITE UR QL STRIP: NEGATIVE
NRBC BLD AUTO-RTO: 0 /100 WBC (ref 0–0.2)
NT-PROBNP SERPL-MCNC: 909.5 PG/ML (ref 0–900)
PCO2 BLDA: 51.7 MM HG (ref 35–45)
PH BLDA: 7.41 PH UNITS (ref 7.35–7.45)
PH UR STRIP.AUTO: <=5 [PH] (ref 5–8)
PLATELET # BLD AUTO: 150 10*3/MM3 (ref 140–450)
PMV BLD AUTO: 11 FL (ref 6–12)
PO2 BLD: 280 MM[HG] (ref 0–500)
PO2 BLDA: 78.4 MM HG (ref 80–100)
POTASSIUM BLDA-SCNC: 3.6 MMOL/L (ref 3.5–5)
POTASSIUM BLDA-SCNC: 3.7 MMOL/L (ref 3.5–5)
POTASSIUM SERPL-SCNC: 3.9 MMOL/L (ref 3.5–5.2)
PROT SERPL-MCNC: 7 G/DL (ref 6–8.5)
PROT UR QL STRIP: ABNORMAL
QT INTERVAL: 325 MS
QTC INTERVAL: 465 MS
RBC # BLD AUTO: 5.3 10*6/MM3 (ref 3.77–5.28)
RSV RNA RESP QL NAA+PROBE: NOT DETECTED
SAO2 % BLDCOA: 95.3 % (ref 95–99)
SARS-COV-2 RNA RESP QL NAA+PROBE: NOT DETECTED
SODIUM BLD-SCNC: 140 MMOL/L (ref 131–143)
SODIUM BLD-SCNC: 142 MMOL/L (ref 131–143)
SODIUM SERPL-SCNC: 133 MMOL/L (ref 136–145)
SP GR UR STRIP: >1.03 (ref 1–1.03)
TROPONIN T % DELTA: 17
TROPONIN T NUMERIC DELTA: 6 NG/L
TROPONIN T SERPL HS-MCNC: 36 NG/L
UROBILINOGEN UR QL STRIP: ABNORMAL
WBC NRBC COR # BLD AUTO: 12.37 10*3/MM3 (ref 3.4–10.8)
WHOLE BLOOD HOLD COAG: NORMAL
WHOLE BLOOD HOLD SPECIMEN: NORMAL

## 2025-05-04 PROCEDURE — 99285 EMERGENCY DEPT VISIT HI MDM: CPT

## 2025-05-04 PROCEDURE — 87205 SMEAR GRAM STAIN: CPT | Performed by: EMERGENCY MEDICINE

## 2025-05-04 PROCEDURE — 82948 REAGENT STRIP/BLOOD GLUCOSE: CPT

## 2025-05-04 PROCEDURE — 82330 ASSAY OF CALCIUM: CPT

## 2025-05-04 PROCEDURE — 87077 CULTURE AEROBIC IDENTIFY: CPT | Performed by: EMERGENCY MEDICINE

## 2025-05-04 PROCEDURE — 83605 ASSAY OF LACTIC ACID: CPT

## 2025-05-04 PROCEDURE — 25010000002 ENOXAPARIN PER 10 MG: Performed by: INTERNAL MEDICINE

## 2025-05-04 PROCEDURE — 82948 REAGENT STRIP/BLOOD GLUCOSE: CPT | Performed by: INTERNAL MEDICINE

## 2025-05-04 PROCEDURE — 94799 UNLISTED PULMONARY SVC/PX: CPT

## 2025-05-04 PROCEDURE — 84484 ASSAY OF TROPONIN QUANT: CPT | Performed by: EMERGENCY MEDICINE

## 2025-05-04 PROCEDURE — 82803 BLOOD GASES ANY COMBINATION: CPT

## 2025-05-04 PROCEDURE — 87637 SARSCOV2&INF A&B&RSV AMP PRB: CPT | Performed by: EMERGENCY MEDICINE

## 2025-05-04 PROCEDURE — 63710000001 INSULIN REGULAR HUMAN PER 5 UNITS

## 2025-05-04 PROCEDURE — 70450 CT HEAD/BRAIN W/O DYE: CPT

## 2025-05-04 PROCEDURE — 63710000001 INSULIN LISPRO (HUMAN) PER 5 UNITS: Performed by: INTERNAL MEDICINE

## 2025-05-04 PROCEDURE — 93010 ELECTROCARDIOGRAM REPORT: CPT | Performed by: INTERNAL MEDICINE

## 2025-05-04 PROCEDURE — 36415 COLL VENOUS BLD VENIPUNCTURE: CPT

## 2025-05-04 PROCEDURE — 83605 ASSAY OF LACTIC ACID: CPT | Performed by: EMERGENCY MEDICINE

## 2025-05-04 PROCEDURE — 83880 ASSAY OF NATRIURETIC PEPTIDE: CPT | Performed by: EMERGENCY MEDICINE

## 2025-05-04 PROCEDURE — 87147 CULTURE TYPE IMMUNOLOGIC: CPT | Performed by: EMERGENCY MEDICINE

## 2025-05-04 PROCEDURE — 36600 WITHDRAWAL OF ARTERIAL BLOOD: CPT

## 2025-05-04 PROCEDURE — 81003 URINALYSIS AUTO W/O SCOPE: CPT

## 2025-05-04 PROCEDURE — 71045 X-RAY EXAM CHEST 1 VIEW: CPT

## 2025-05-04 PROCEDURE — 80053 COMPREHEN METABOLIC PANEL: CPT | Performed by: EMERGENCY MEDICINE

## 2025-05-04 PROCEDURE — 82009 KETONE BODYS QUAL: CPT

## 2025-05-04 PROCEDURE — 94640 AIRWAY INHALATION TREATMENT: CPT

## 2025-05-04 PROCEDURE — 63710000001 PREDNISONE PER 5 MG: Performed by: INTERNAL MEDICINE

## 2025-05-04 PROCEDURE — 87040 BLOOD CULTURE FOR BACTERIA: CPT | Performed by: EMERGENCY MEDICINE

## 2025-05-04 PROCEDURE — 87070 CULTURE OTHR SPECIMN AEROBIC: CPT | Performed by: EMERGENCY MEDICINE

## 2025-05-04 PROCEDURE — 87186 SC STD MICRODIL/AGAR DIL: CPT | Performed by: EMERGENCY MEDICINE

## 2025-05-04 PROCEDURE — 93005 ELECTROCARDIOGRAM TRACING: CPT | Performed by: EMERGENCY MEDICINE

## 2025-05-04 PROCEDURE — 80051 ELECTROLYTE PANEL: CPT

## 2025-05-04 PROCEDURE — 99223 1ST HOSP IP/OBS HIGH 75: CPT | Performed by: INTERNAL MEDICINE

## 2025-05-04 PROCEDURE — 85025 COMPLETE CBC W/AUTO DIFF WBC: CPT | Performed by: EMERGENCY MEDICINE

## 2025-05-04 PROCEDURE — 94660 CPAP INITIATION&MGMT: CPT

## 2025-05-04 RX ORDER — METOPROLOL TARTRATE 25 MG/1
12.5 TABLET, FILM COATED ORAL EVERY 12 HOURS SCHEDULED
Status: DISCONTINUED | OUTPATIENT
Start: 2025-05-04 | End: 2025-05-05 | Stop reason: HOSPADM

## 2025-05-04 RX ORDER — SODIUM CHLORIDE 0.9 % (FLUSH) 0.9 %
10 SYRINGE (ML) INJECTION AS NEEDED
Status: DISCONTINUED | OUTPATIENT
Start: 2025-05-04 | End: 2025-05-05 | Stop reason: HOSPADM

## 2025-05-04 RX ORDER — ONDANSETRON 2 MG/ML
4 INJECTION INTRAMUSCULAR; INTRAVENOUS EVERY 6 HOURS PRN
Status: DISCONTINUED | OUTPATIENT
Start: 2025-05-04 | End: 2025-05-05 | Stop reason: HOSPADM

## 2025-05-04 RX ORDER — BISACODYL 5 MG/1
5 TABLET, DELAYED RELEASE ORAL DAILY PRN
Status: DISCONTINUED | OUTPATIENT
Start: 2025-05-04 | End: 2025-05-04

## 2025-05-04 RX ORDER — SODIUM CHLORIDE 0.9 % (FLUSH) 0.9 %
10 SYRINGE (ML) INJECTION EVERY 12 HOURS SCHEDULED
Status: DISCONTINUED | OUTPATIENT
Start: 2025-05-04 | End: 2025-05-05 | Stop reason: HOSPADM

## 2025-05-04 RX ORDER — PREDNISONE 10 MG/1
15 TABLET ORAL 2 TIMES DAILY
Status: DISCONTINUED | OUTPATIENT
Start: 2025-05-04 | End: 2025-05-04

## 2025-05-04 RX ORDER — AMOXICILLIN 250 MG
2 CAPSULE ORAL 2 TIMES DAILY PRN
Status: DISCONTINUED | OUTPATIENT
Start: 2025-05-04 | End: 2025-05-04

## 2025-05-04 RX ORDER — FUROSEMIDE 20 MG/1
20 TABLET ORAL DAILY
Status: DISCONTINUED | OUTPATIENT
Start: 2025-05-04 | End: 2025-05-04

## 2025-05-04 RX ORDER — AMOXICILLIN 250 MG
2 CAPSULE ORAL 2 TIMES DAILY PRN
Status: DISCONTINUED | OUTPATIENT
Start: 2025-05-04 | End: 2025-05-05 | Stop reason: HOSPADM

## 2025-05-04 RX ORDER — PREDNISONE 10 MG/1
15 TABLET ORAL 2 TIMES DAILY
Status: DISCONTINUED | OUTPATIENT
Start: 2025-05-04 | End: 2025-05-05 | Stop reason: HOSPADM

## 2025-05-04 RX ORDER — PAROXETINE 20 MG/1
20 TABLET, FILM COATED ORAL EVERY MORNING
Status: DISCONTINUED | OUTPATIENT
Start: 2025-05-05 | End: 2025-05-04

## 2025-05-04 RX ORDER — ACETAMINOPHEN 650 MG/1
650 SUPPOSITORY RECTAL EVERY 4 HOURS PRN
Status: DISCONTINUED | OUTPATIENT
Start: 2025-05-04 | End: 2025-05-05 | Stop reason: HOSPADM

## 2025-05-04 RX ORDER — ACETAMINOPHEN 160 MG/5ML
650 SOLUTION ORAL EVERY 4 HOURS PRN
Status: DISCONTINUED | OUTPATIENT
Start: 2025-05-04 | End: 2025-05-05 | Stop reason: HOSPADM

## 2025-05-04 RX ORDER — ACETAMINOPHEN 325 MG/1
650 TABLET ORAL EVERY 4 HOURS PRN
Status: DISCONTINUED | OUTPATIENT
Start: 2025-05-04 | End: 2025-05-05 | Stop reason: HOSPADM

## 2025-05-04 RX ORDER — INSULIN LISPRO 100 [IU]/ML
2-7 INJECTION, SOLUTION INTRAVENOUS; SUBCUTANEOUS EVERY 6 HOURS SCHEDULED
Status: DISCONTINUED | OUTPATIENT
Start: 2025-05-04 | End: 2025-05-05 | Stop reason: HOSPADM

## 2025-05-04 RX ORDER — NICOTINE POLACRILEX 4 MG
15 LOZENGE BUCCAL
Status: DISCONTINUED | OUTPATIENT
Start: 2025-05-04 | End: 2025-05-05 | Stop reason: HOSPADM

## 2025-05-04 RX ORDER — BISACODYL 10 MG
10 SUPPOSITORY, RECTAL RECTAL DAILY PRN
Status: DISCONTINUED | OUTPATIENT
Start: 2025-05-04 | End: 2025-05-05 | Stop reason: HOSPADM

## 2025-05-04 RX ORDER — DEXTROSE MONOHYDRATE 25 G/50ML
25 INJECTION, SOLUTION INTRAVENOUS
Status: DISCONTINUED | OUTPATIENT
Start: 2025-05-04 | End: 2025-05-05 | Stop reason: HOSPADM

## 2025-05-04 RX ORDER — IPRATROPIUM BROMIDE AND ALBUTEROL SULFATE 2.5; .5 MG/3ML; MG/3ML
3 SOLUTION RESPIRATORY (INHALATION) ONCE
Status: COMPLETED | OUTPATIENT
Start: 2025-05-04 | End: 2025-05-04

## 2025-05-04 RX ORDER — ALPRAZOLAM 0.25 MG
0.25 TABLET ORAL 3 TIMES DAILY PRN
Status: DISCONTINUED | OUTPATIENT
Start: 2025-05-04 | End: 2025-05-04

## 2025-05-04 RX ORDER — POLYETHYLENE GLYCOL 3350 17 G/17G
17 POWDER, FOR SOLUTION ORAL DAILY PRN
Status: DISCONTINUED | OUTPATIENT
Start: 2025-05-04 | End: 2025-05-04

## 2025-05-04 RX ORDER — FUROSEMIDE 20 MG/1
20 TABLET ORAL DAILY
Status: DISCONTINUED | OUTPATIENT
Start: 2025-05-04 | End: 2025-05-05 | Stop reason: HOSPADM

## 2025-05-04 RX ORDER — ARFORMOTEROL TARTRATE 15 UG/2ML
15 SOLUTION RESPIRATORY (INHALATION)
Status: DISCONTINUED | OUTPATIENT
Start: 2025-05-04 | End: 2025-05-05 | Stop reason: HOSPADM

## 2025-05-04 RX ORDER — IPRATROPIUM BROMIDE AND ALBUTEROL SULFATE 2.5; .5 MG/3ML; MG/3ML
3 SOLUTION RESPIRATORY (INHALATION) 4 TIMES DAILY PRN
Status: DISCONTINUED | OUTPATIENT
Start: 2025-05-04 | End: 2025-05-05 | Stop reason: HOSPADM

## 2025-05-04 RX ORDER — BISACODYL 10 MG
10 SUPPOSITORY, RECTAL RECTAL DAILY PRN
Status: DISCONTINUED | OUTPATIENT
Start: 2025-05-04 | End: 2025-05-04

## 2025-05-04 RX ORDER — ALPRAZOLAM 0.25 MG
0.25 TABLET ORAL 3 TIMES DAILY PRN
Status: DISCONTINUED | OUTPATIENT
Start: 2025-05-04 | End: 2025-05-05 | Stop reason: HOSPADM

## 2025-05-04 RX ORDER — FAMOTIDINE 20 MG/1
40 TABLET, FILM COATED ORAL DAILY
Status: DISCONTINUED | OUTPATIENT
Start: 2025-05-05 | End: 2025-05-05 | Stop reason: HOSPADM

## 2025-05-04 RX ORDER — METOPROLOL SUCCINATE 25 MG/1
25 TABLET, EXTENDED RELEASE ORAL
Status: DISCONTINUED | OUTPATIENT
Start: 2025-05-04 | End: 2025-05-04

## 2025-05-04 RX ORDER — BUDESONIDE 0.5 MG/2ML
0.5 INHALANT ORAL
Status: DISCONTINUED | OUTPATIENT
Start: 2025-05-04 | End: 2025-05-05 | Stop reason: HOSPADM

## 2025-05-04 RX ORDER — SODIUM CHLORIDE 9 MG/ML
40 INJECTION, SOLUTION INTRAVENOUS AS NEEDED
Status: DISCONTINUED | OUTPATIENT
Start: 2025-05-04 | End: 2025-05-05 | Stop reason: HOSPADM

## 2025-05-04 RX ORDER — ACETAMINOPHEN 650 MG/1
650 SUPPOSITORY RECTAL EVERY 4 HOURS PRN
Status: DISCONTINUED | OUTPATIENT
Start: 2025-05-04 | End: 2025-05-04

## 2025-05-04 RX ORDER — ENOXAPARIN SODIUM 100 MG/ML
40 INJECTION SUBCUTANEOUS EVERY 24 HOURS
Status: DISCONTINUED | OUTPATIENT
Start: 2025-05-04 | End: 2025-05-05 | Stop reason: HOSPADM

## 2025-05-04 RX ORDER — INSULIN LISPRO 100 [IU]/ML
2-7 INJECTION, SOLUTION INTRAVENOUS; SUBCUTANEOUS
Status: DISCONTINUED | OUTPATIENT
Start: 2025-05-04 | End: 2025-05-04

## 2025-05-04 RX ORDER — ACETAMINOPHEN 325 MG/1
650 TABLET ORAL EVERY 4 HOURS PRN
Status: DISCONTINUED | OUTPATIENT
Start: 2025-05-04 | End: 2025-05-04

## 2025-05-04 RX ORDER — PAROXETINE 10 MG/5ML
20 SUSPENSION ORAL DAILY
Status: DISCONTINUED | OUTPATIENT
Start: 2025-05-05 | End: 2025-05-05 | Stop reason: HOSPADM

## 2025-05-04 RX ORDER — IBUPROFEN 600 MG/1
1 TABLET ORAL
Status: DISCONTINUED | OUTPATIENT
Start: 2025-05-04 | End: 2025-05-05 | Stop reason: HOSPADM

## 2025-05-04 RX ORDER — POLYETHYLENE GLYCOL 3350 17 G/17G
17 POWDER, FOR SOLUTION ORAL DAILY PRN
Status: DISCONTINUED | OUTPATIENT
Start: 2025-05-04 | End: 2025-05-05 | Stop reason: HOSPADM

## 2025-05-04 RX ORDER — FAMOTIDINE 20 MG/1
40 TABLET, FILM COATED ORAL DAILY
Status: DISCONTINUED | OUTPATIENT
Start: 2025-05-04 | End: 2025-05-04

## 2025-05-04 RX ORDER — ACETAMINOPHEN 160 MG/5ML
650 SOLUTION ORAL EVERY 4 HOURS PRN
Status: DISCONTINUED | OUTPATIENT
Start: 2025-05-04 | End: 2025-05-04

## 2025-05-04 RX ADMIN — Medication 10 ML: at 14:50

## 2025-05-04 RX ADMIN — PREDNISONE 15 MG: 10 TABLET ORAL at 20:13

## 2025-05-04 RX ADMIN — METOPROLOL TARTRATE 12.5 MG: 25 TABLET, FILM COATED ORAL at 17:52

## 2025-05-04 RX ADMIN — Medication 10 ML: at 20:13

## 2025-05-04 RX ADMIN — INSULIN LISPRO 4 UNITS: 100 INJECTION, SOLUTION INTRAVENOUS; SUBCUTANEOUS at 17:53

## 2025-05-04 RX ADMIN — FUROSEMIDE 20 MG: 20 TABLET ORAL at 17:43

## 2025-05-04 RX ADMIN — INSULIN HUMAN 7 UNITS: 100 INJECTION, SOLUTION PARENTERAL at 09:34

## 2025-05-04 RX ADMIN — ARFORMOTEROL TARTRATE 15 MCG: 15 SOLUTION RESPIRATORY (INHALATION) at 19:10

## 2025-05-04 RX ADMIN — IPRATROPIUM BROMIDE AND ALBUTEROL SULFATE 3 ML: .5; 3 SOLUTION RESPIRATORY (INHALATION) at 08:43

## 2025-05-04 RX ADMIN — ENOXAPARIN SODIUM 40 MG: 40 INJECTION SUBCUTANEOUS at 14:52

## 2025-05-04 RX ADMIN — BUDESONIDE 0.5 MG: 0.5 SUSPENSION RESPIRATORY (INHALATION) at 19:10

## 2025-05-04 NOTE — PLAN OF CARE
Goal Outcome Evaluation:               Patient has had shortness of air, nonadherence with home CPAP and PRN nasal cannula -- manual BP at 1201 upon admission by RN was 88/30, second manual by PCA 80/38, patient put in trendelenburg position and RRT was initiated, ABGs obtained, BP improved and MD arrived bedside -- home meds were started with strict NPO and meds to be administered via PEG -- patient has been tachycardic and had bursts of SVT, MD notified, metoprolol 12.5mg administered early -- patient intermittently holds breath making her oxygen saturation drop and alternates compliancy. Patient safety ensured, call light within reach.

## 2025-05-04 NOTE — ED PROVIDER NOTES
Time: 10:40 AM EDT  Date of encounter:  5/4/2025  Independent Historian/Clinical History and Information was obtained by:   Patient, Family, and EMS    History is limited by: N/A    Chief Complaint: Shortness of air      History of Present Illness:  Patient is a 59 y.o. year old female who presents to the emergency department for evaluation of shortness of air that began this morning as well as lethargy per family.  EMS states they arrived patient was 85% on room air.  Patient wears 2 L as needed.  Patient is a lung cancer patient.  Patient denies chest pain.      Patient Care Team  Primary Care Provider: Virgil Salas MD    Past Medical History:     Allergies   Allergen Reactions    Amoxicillin Shortness Of Breath     Has tolerated Cefazolin, Ceftriaxone, and Cefepime -Jermaine Melida, [facility]    Ceclor [Cefaclor] Shortness Of Breath     Has tolerated Cefazolin, Ceftriaxone, and Cefepime -Jermaine Melida, [facility]      Penicillins Shortness Of Breath     Has tolerated Cefazolin, Ceftriaxone, and Cefepime -Jermaine Melida, [facility]    Sulfa Antibiotics Rash    Valium [Diazepam] Mental Status Change     DEPRESSED    Ambien [Zolpidem] Mental Status Change    Aspirin GI Intolerance    Ativan [Lorazepam] Mental Status Change    Benadryl [Diphenhydramine] Anxiety     AND MAKES HER HYPER    Biaxin [Clarithromycin] Unknown - Low Severity    Cephalexin Unknown - Low Severity    Clindamycin Unknown - Low Severity    Compazine [Prochlorperazine Edisylate] Rash    Contrast Dye (Echo Or Unknown Ct/Mr) Other (See Comments)     Caused pain in her arm    Doxycycline Rash    Nsaids GI Intolerance    Phenergan [Promethazine Hcl] GI Intolerance    Promethazine GI Intolerance    Vancomycin Itching     Past Medical History:   Diagnosis Date    Anesthesia     REPORTS HAS GOT REAL EMOTIONAL AND HAS BECOME ANGRY BEFORE    Anxiety     Aortic stenosis, severe     HAS BIO VALVE REPLACED    Arthritis     Asthma     Back pain     Blood clotting  disorder     test to find out what type    Cancer     CHF (congestive heart failure)     Chronic low back pain with sciatica     Chronic pain disorder     COPD (chronic obstructive pulmonary disease)     Coronary artery disease     looking to follow with Cardio    Diabetes mellitus     TYPE 2    Dyspnea on effort     Fatty liver     GERD (gastroesophageal reflux disease)     H/O lumpectomy     H/O: hysterectomy     Hiatal hernia     History of degenerative disc disease     History of kidney stones     History of pulmonary embolus (PE)     History of transfusion     AS A CHILD NO TRANSFUSION REACTION    Hodgkin's lymphoma     5/19/23  5 YEARS SINCE LAST CHEMO TX    Hypertension     Immobility     Lupus     Nausea vomiting and diarrhea 03/07/2024    Panic disorder     Pelvic pain     Peripheral neuropathy     Personal history of DVT (deep vein thrombosis)     over 20 years    PONV (postoperative nausea and vomiting)     Presence of intrathecal pump     PTSD (post-traumatic stress disorder)     Sacroiliac joint disease     SI (sacroiliac) joint inflammation     Venous insufficiency      Past Surgical History:   Procedure Laterality Date    ABDOMINAL SURGERY      BACK SURGERY      SPINAL FUSION     BACK SURGERY      BLADDER REPAIR      BRONCHOSCOPY WITH ION ROBOTIC ASSIST N/A 4/22/2025    Procedure: BRONCHOSCOPY WITH ION ROBOT: REBUS, EBUS, NEEDLE ASPIRATES, BIOPSIES, BRUSHINGS, BAL, WASHINGS: insertion of lighted robot navigated instrument to view inside the lung;  Surgeon: Enzo Segal MD;  Location: [facility] MAIN OR;  Service: Robotics - Pulmonary;  Laterality: N/A;    CARDIAC CATHETERIZATION N/A 03/06/2019    Procedure: Valvuloplasty;  Surgeon: Wayne Johnson MD;  Location: [facility] CATH INVASIVE LOCATION;  Service: Cardiology    CARDIAC CATHETERIZATION      CARDIAC CATHETERIZATION Left 05/31/2023    Procedure: Cardiac Catheterization/Vascular Study;  Surgeon: Poncho Reid MD;  Location: [facility]  CATH INVASIVE LOCATION;  Service: Cardiovascular;  Laterality: Left;    CARDIAC SURGERY      Mechanical valve    CARDIAC VALVE REPLACEMENT  2021    CHOLECYSTECTOMY      COLONOSCOPY N/A 12/29/2021    Procedure: COLONOSCOPY;  Surgeon: Karine Orlando MD;  Location: [facility] ENDOSCOPY;  Service: Gastroenterology;  Laterality: N/A;  POOR PREP    COLONOSCOPY N/A 04/13/2022    Procedure: COLONOSCOPY WITH POLYPECTOMY;  Surgeon: Karine Orlando MD;  Location: [facility] ENDOSCOPY;  Service: Gastroenterology;  Laterality: N/A;  COLON POLYP, HEMORRHOIDS, POOR PREP    COLONOSCOPY      DIRECT LARYNGOSCOPY, ESOPHAGOSCOPY, BRONCHOSCOPY N/A 05/22/2023    Procedure: DIRECT LARYNGOSCOPY WITH BIOPSIES , ESOPHAGOSCOPY, BRONCHOSCOPY;  Surgeon: Ronnie Mcgarry MD;  [place] [facility] [place] [facility] [place]  Service: ENT;  Laterality: N/A;    ENDOSCOPY N/A 12/29/2021    Procedure: ESOPHAGOGASTRODUODENOSCOPY;  Surgeon: Karine Orlando MD;  Location: [facility] ENDOSCOPY;  Service: Gastroenterology;  Laterality: N/A;  ESOPHAGITIS, GASTRITIS, HIATAL HERNIA    ENDOSCOPY      ENDOSCOPY N/A 04/16/2024    Procedure: ESOPHAGOGASTRODUODENOSCOPY WITH BIOPSIES;  Surgeon: Karine Orlando MD;  Location: [facility] ENDOSCOPY;  Service: Gastroenterology;  Laterality: N/A;  ESOPHAGITIS, HIATAL HERNIA    ENDOSCOPY W/ PEG TUBE PLACEMENT N/A 12/10/2024    Procedure: ESOPHAGOGASTRODUODENOSCOPY WITH PERCUTANEOUS ENDOSCOPIC GASTROSTOMY TUBE INSERTION WITH ANESTHESIA;  Surgeon: Rodger Ortega MD;  Location: [facility] ENDOSCOPY;  Service: Gastroenterology;  Laterality: N/A;  PEG TUBE INSERTION    HYSTERECTOMY      LYMPHADENECTOMY      PAIN PUMP INSERTION/REVISION N/A 10/18/2019    Procedure: PAIN PUMP INSERTION [facility] [place] 10[facility]18[facility] [place];  Surgeon: Horace Rios MD;  Location: [facility] OR;  Service: Pain Management    PAIN PUMP INSERTION/REVISION N/A 11/23/2020    Procedure: pain pump removal;  Surgeon: Horace Rios MD;  Location: [facility]  MAIN OR;  Service: Pain Management;  Laterality: N/A;    PEG TUBE INSERTION N/A 07/26/2023    Procedure: PERCUTANEOUS ENDOSCOPIC GASTROSTOMY TUBE INSERTION;  Surgeon: Kody Mercer MD;  Location: [facility] ENDOSCOPY;  Service: General;  Laterality: N/A;  SUCCESSFUL PEG TUBE PLACE PLACEMENT    PORTACATH PLACEMENT      IN LEFT CHEST REPORTS THAT IT IS NOT FUNCTIONING    SKIN BIOPSY      TONSILLECTOMY      TUBAL ABDOMINAL LIGATION      TUMOR REMOVAL      vaginal /anal area     Family History   Problem Relation Age of Onset    Heart failure Mother     Anxiety disorder Mother     Prostate cancer Father     Depression Sister     Bipolar disorder Sister     Pancreatic cancer Sister     ADD / ADHD Brother     Thyroid cancer Maternal Grandmother     Pancreatic cancer Paternal Grandmother     ADD / ADHD Grandson     ADD / ADHD Granddaughter     ADD / ADHD Nephew     Malig Hyperthermia Neg Hx        Home Medications:  Prior to Admission medications    Medication Sig Start Date End Date Taking? Authorizing Provider   acetaminophen (Tylenol) 325 MG tablet Take 1 tablet by mouth Every 6 (Six) Hours As Needed for Mild Pain, Headache or Fever.    Provider, MD Nette   albuterol sulfate  (90 Base) MCG/ACT inhaler Inhale 2 puffs Every 4 (Four) Hours As Needed for Wheezing. 11/20/23   Katia Wright MD   ALPRAZolam (XANAX) 0.25 MG tablet Take 1 tablet by mouth 3 times a day. 11/25/24   ProviderNette MD   arformoterol (Brovana) 15 MCG/2ML nebulizer solution Take 2 mL by nebulization 2 (Two) Times a Day for 30 days. 4/9/25 5/9/25  Xochitl Estrada APRN   budesonide (Pulmicort) 0.5 MG/2ML nebulizer solution Take 2 mL by nebulization 2 (Two) Times a Day for 30 days. Rinse mouth out after each use 4/9/25 5/9/25  Xochitl Estrada APRN   famotidine (Pepcid) 40 MG tablet Take 1 tablet by mouth every night at bedtime. 3/27/25   Sobeida Espinosa APRN   furosemide (LASIX) 20 MG tablet Take 1 tablet by mouth Daily.     Provider, MD Nette   Insulin Lispro, 1 Unit Dial, (HUMALOG) 100 UNIT/ML solution pen-injector Inject 14 Units under the skin into the appropriate area as directed Daily. 2/14/25   Nette Jiménez MD   ipratropium-albuterol (DUO-NEB) 0.5-2.5 mg/3 ml nebulizer Take 3 mL by nebulization 4 (Four) Times a Day As Needed for Wheezing for up to 30 days. 4/9/25 5/9/25  Xochitl Estrada APRN   loperamide (IMODIUM) 2 MG capsule Take 1 capsule by mouth 4 (Four) Times a Day As Needed for Diarrhea (2 with 1st episode of diarrhea and 1 with each additonal episode, max of 8 per day.). 4/21/25   Sobeida Espinosa APRN   metoprolol succinate XL (TOPROL-XL) 25 MG 24 hr tablet Take 1 tablet by mouth every night at bedtime.  Patient not taking: Reported on 4/25/2025 4/17/25   Fredis Chris MD   mupirocin (BACTROBAN) 2 % ointment Apply 1 Application topically to the appropriate area as directed 3 (Three) Times a Day. 4/10/24   Katia Wright MD   naloxone (NARCAN) 4 MG/0.1ML nasal spray Call 911. Don't prime. [place] in 1 nostril for overdose. Repeat in 2-3 minutes in other nostril if no or minimal breathing/responsiveness.  Patient not taking: Reported on 4/25/2025 12/12/24   Ruperto Abdi MD   ondansetron ODT (ZOFRAN-ODT) 8 MG disintegrating tablet Place 1 tablet on the tongue Daily As Needed. 3/27/25   Nette Jiménez MD   pain patient supplied pump by Intrathecal route Continuous. Type of Medication: Hydromorphone 15 mg/ml and Bupivacaine 0.5 mg/ml  [employer] [phone]  Provider:Dr. Boudreaux  Provider number: [phone]  Basal Dose: Hydromorphone 7.518 mg/day Bupivacaine 0.85225 mg/day  Pump capacity: 40ml  ( MAX of Hydromorphone 9.456 mg/ day; Bupivacaine 0.48635 mg /day)  Bolus Dose:Hydromorphone 0.500 mg, Bupivacaine 0.48038 mg  Max activations: 4 per day  Lockout 3 hours. 1 Bolus per every 3 hours   Last refill-  03/06/2025  Alarm date- 05/05/2025  Pump manufacture:MedManny Escobar MD  "  PARoxetine (PAXIL) 20 MG tablet Take 1 tablet by mouth Every Morning. 3/27/25   Nette Jiménez MD   predniSONE (DELTASONE) 20 MG tablet Take 2 tablets by mouth 2 (Two) Times a Day.  Patient taking differently: Take 15 mg by mouth 2 (Two) Times a Day. 15mg in am 15mg in pm 12/23/24   Goyo Nunez MD   Systane 0.4-0.3 % solution ophthalmic solution (artificial tears) Administer 1-2 drops to both eyes 4 (Four) Times a Day As Needed. 1/31/25   ProviderNette MD        Social History:   Social History     Tobacco Use    Smoking status: Every Day     Current packs/day: 2.00     Average packs/day: 2.0 packs/day for 46.3 years (92.7 ttl pk-yrs)     Types: Cigarettes     Start date: 1979     Passive exposure: Current    Smokeless tobacco: Never    Tobacco comments:     Currently smoking 1.5 daily. 04--js   Vaping Use    Vaping status: Never Used   Substance Use Topics    Alcohol use: Never    Drug use: Never         Review of Systems:  Review of Systems   Constitutional:  Negative for chills and fever.   HENT:  Negative for congestion, rhinorrhea and sore throat.    Eyes:  Negative for pain and visual disturbance.   Respiratory:  Positive for shortness of breath and wheezing. Negative for apnea, cough and chest tightness.    Cardiovascular:  Negative for chest pain and palpitations.   Gastrointestinal:  Negative for abdominal pain, diarrhea, nausea and vomiting.   Genitourinary:  Negative for difficulty urinating and dysuria.   Musculoskeletal:  Negative for joint swelling and myalgias.   Skin:  Negative for color change.   Neurological:  Negative for seizures and headaches.   Psychiatric/Behavioral:  Positive for decreased concentration.    All other systems reviewed and are negative.       Physical Exam:  /63 (BP Location: Right arm, Patient Position: Lying)   Pulse (!) 122   Temp 98.7 °F (37.1 °C) (Oral)   Resp 19   Ht 167.6 cm (66\")   Wt 60.8 kg (134 lb 0.6 oz)   LMP  (LMP " Unknown)   SpO2 98%   BMI 21.63 kg/m²     Physical Exam  Vitals and nursing note reviewed.   Constitutional:       General: She is not in acute distress.     Appearance: Normal appearance. She is not toxic-appearing.      Comments: Arousal to verbal and pain stimuli    HENT:      Head: Normocephalic and atraumatic.      Jaw: There is normal jaw occlusion.   Eyes:      General: Lids are normal.      Extraocular Movements: Extraocular movements intact.      Conjunctiva/sclera: Conjunctivae normal.      Pupils: Pupils are equal, round, and reactive to light.   Cardiovascular:      Rate and Rhythm: Normal rate and regular rhythm.      Pulses: Normal pulses.      Heart sounds: Normal heart sounds.   Pulmonary:      Effort: Tachypnea present.      Breath sounds: Wheezing present. No rhonchi.   Abdominal:      General: Abdomen is flat.      Palpations: Abdomen is soft.      Tenderness: There is no abdominal tenderness. There is no guarding or rebound.   Musculoskeletal:         General: Normal range of motion.      Cervical back: Normal range of motion and neck supple.      Right lower leg: No edema.      Left lower leg: No edema.   Skin:     General: Skin is warm and dry.   Neurological:      Mental Status: Mental status is at baseline.                    Medical Decision Making:      Comorbidities that affect care:    Copd, htn, asthma, cancer     External Notes reviewed:          The following orders were placed and all results were independently analyzed by me:  Orders Placed This Encounter   Procedures    COVID-19, FLU A/B, RSV PCR 1 HR TAT - Swab, Nasopharynx    Blood Culture - Blood,    Blood Culture - Blood,    Wound Culture - Swab, Leg, Right    XR Chest 1 View    CT Head Without Contrast    [place] Draw    Comprehensive Metabolic Panel    BNP    High Sensitivity Troponin T    CBC Auto Differential    Lactic Acid, Plasma    Arterial Blood Gas,H&H,Lytes,Lactate    Urinalysis With Microscopic If Indicated (No  Culture) - Straight Cath    High Sensitivity Troponin T 1Hr    Acetone    Blood Gas, Arterial -    NPO Diet NPO Type: Strict NPO    Undress & Gown    Vital Signs    Continuous Pulse Oximetry    Inpatient Hospitalist Consult    Oxygen Therapy- Nasal Cannula; Titrate 1-6 LPM Per SpO2; 90 - 95%    NIPPV - Provider Settings    POC Glucose Once    POC Glucose Once    POC Lactate    POC Electrolyte Panel    ECG 12 Lead ED Triage Standing Order; SOA    Insert Peripheral IV    Inpatient Admission    CBC & Differential    Green Top (Gel)    Lavender Top    Gold Top - SST    Light Blue Top       Medications Given in the Emergency Department:  Medications   sodium chloride 0.9 % flush 10 mL (has no administration in time range)   ipratropium-albuterol (DUO-NEB) nebulizer solution 3 mL (3 mL Nebulization Given 5/4/25 0843)   insulin regular (humuLIN R,novoLIN R) injection 7 Units (7 Units Intravenous Given 5/4/25 0934)        ED Course:         Labs:    Lab Results (last 24 hours)       Procedure Component Value Units Date/Time    CBC & Differential [[id number]]  (Abnormal) Collected: 05/04/25 0818    Specimen: Blood Updated: 05/04/25 0825    Narrative:      The following orders were created for panel order CBC & Differential.  Procedure                               Abnormality         Status                     ---------                               -----------         ------                     CBC Auto Differential[[id number]]        Abnormal            Final result                 Please view results for these tests on the individual orders.    Comprehensive Metabolic Panel [[id number]]  (Abnormal) Collected: 05/04/25 0818    Specimen: Blood Updated: 05/04/25 0846     Glucose 382 mg/dL      BUN 15 mg/dL      Creatinine 0.54 mg/dL      Sodium 133 mmol/L      Potassium 3.9 mmol/L      Chloride 94 mmol/L      CO2 28.4 mmol/L      Calcium 9.2 mg/dL      Total Protein 7.0 g/dL      Albumin 3.9 g/dL      ALT (SGPT) 15 U/L      AST  (SGOT) 15 U/L      Alkaline Phosphatase 118 U/L      Total Bilirubin 0.2 mg/dL      Globulin 3.1 gm/dL      A/G Ratio 1.3 g/dL      BUN/Creatinine Ratio 27.8     Anion Gap 10.6 mmol/L      eGFR 106.2 mL/min/1.73     Narrative:      GFR Categories in Chronic Kidney Disease (CKD)              GFR Category          GFR (mL/min/1.73)    Interpretation  G1                    90 or greater        Normal or high (1)  G2                    60-89                Mild decrease (1)  G3a                   45-59                Mild to moderate decrease  G3b                   30-44                Moderate to severe decrease  G4                    15-29                Severe decrease  G5                    14 or less           Kidney failure    (1)In the absence of evidence of kidney disease, neither GFR category G1 or G2 fulfill the criteria for CKD.    eGFR calculation 2021 CKD-EPI creatinine equation, which does not include race as a factor    BNP [[id number]]  (Abnormal) Collected: 05/04/25 0818    Specimen: Blood Updated: 05/04/25 0843     proBNP 909.5 pg/mL     Narrative:      This assay is used as an aid in the diagnosis of individuals suspected of having heart failure. It can be used as an aid in the diagnosis of acute decompensated heart failure (ADHF) in patients presenting with signs and symptoms of ADHF to the emergency department (ED). In addition, NT-proBNP of <300 pg/mL indicates ADHF is not likely.    Age Range Result Interpretation  NT-proBNP Concentration (pg/mL:      <50             Positive            >450                   Gray                 300-450                    Negative             <300    50-75           Positive            >900                  Gray                300-900                  Negative            <300      >75             Positive            >1800                  Gray                300-1800                  Negative            <300    High Sensitivity Troponin T [[id number]]  (Abnormal)  Collected: 05/04/25 0818    Specimen: Blood Updated: 05/04/25 0846     HS Troponin T 36 ng/L     Narrative:      High Sensitive Troponin T Reference Range:  <14.0 ng/L- Negative Female for AMI  <22.0 ng/L- Negative Male for AMI  >=14 - Abnormal Female indicating possible myocardial injury.  >=22 - Abnormal Male indicating possible myocardial injury.   Clinicians would have to utilize clinical acumen, EKG, Troponin, and serial changes to determine if it is an Acute Myocardial Infarction or myocardial injury due to an underlying chronic condition.         CBC Auto Differential [[id number]]  (Abnormal) Collected: 05/04/25 0818    Specimen: Blood Updated: 05/04/25 0825     WBC 12.37 10*3/mm3      RBC 5.30 10*6/mm3      Hemoglobin 13.9 g/dL      Hematocrit 46.6 %      MCV 87.9 fL      MCH 26.2 pg      MCHC 29.8 g/dL      RDW 14.7 %      RDW-SD 47.3 fl      MPV 11.0 fL      Platelets 150 10*3/mm3      Neutrophil % 88.3 %      Lymphocyte % 6.6 %      Monocyte % 3.9 %      Eosinophil % 0.1 %      Basophil % 0.2 %      Immature Grans % 0.9 %      Neutrophils, Absolute 10.93 10*3/mm3      Lymphocytes, Absolute 0.82 10*3/mm3      Monocytes, Absolute 0.48 10*3/mm3      Eosinophils, Absolute 0.01 10*3/mm3      Basophils, Absolute 0.02 10*3/mm3      Immature Grans, Absolute 0.11 10*3/mm3      nRBC 0.0 /100 WBC     COVID-19, FLU A/B, RSV PCR 1 HR TAT - Swab, Nasopharynx [[id number]]  (Normal) Collected: 05/04/25 0818    Specimen: Swab from Nasopharynx Updated: 05/04/25 0918     COVID19 Not Detected     Influenza A PCR Not Detected     Influenza B PCR Not Detected     RSV, PCR Not Detected    Narrative:      Fact sheet for providers: https://www.fda.gov/media/925478/download    Fact sheet for patients: https://www.fda.gov/media/209357/download    Test performed by PCR.    Lactic Acid, Plasma [[id number]]  (Normal) Collected: 05/04/25 0818    Specimen: Blood Updated: 05/04/25 0844     Lactate 2.0 mmol/L     Acetone [[id number]]   (Normal) Collected: 05/04/25 0818    Specimen: Blood Updated: 05/04/25 0859     Acetone Negative    POC Glucose Once [[id number]]  (Abnormal) Collected: 05/04/25 0824    Specimen: Blood Updated: 05/04/25 0826     Glucose 368 mg/dL      Comment: Serial Number: [id number]:  [id number]       Urinalysis With Microscopic If Indicated (No Culture) - Straight Cath [[id number]]  (Abnormal) Collected: 05/04/25 0838    Specimen: Urine from Straight Cath Updated: 05/04/25 0855     Color, UA Yellow     Appearance, UA Clear     pH, UA <=5.0     Specific Gravity, UA >1.030     Glucose, UA >=1000 mg/dL (3+)     Ketones, UA Negative     Bilirubin, UA Negative     Blood, UA Negative     Protein, UA Trace     Leuk Esterase, UA Negative     Nitrite, UA Negative     Urobilinogen, UA 0.2 E.U./dL    Narrative:      Urine microscopic not indicated.    Wound Culture - Swab, Leg, Right [[id number]] Collected: 05/04/25 0842    Specimen: Swab from Leg, Right Updated: 05/04/25 0844    POC Glucose Once [[id number]]  (Abnormal) Collected: 05/04/25 0849    Specimen: Arterial Blood Updated: 05/04/25 0854     Glucose 410 mg/dL      Comment: Serial Number: [id number]:  [id number]       Blood Gas, Arterial - [[id number]]  (Abnormal) Collected: 05/04/25 0849    Specimen: Arterial Blood Updated: 05/04/25 0854     Site Right Brachial     Shane's Test Positive     pH, Arterial 7.407 pH units      pCO2, Arterial 51.7 mm Hg      pO2, Arterial 78.4 mm Hg      HCO3, Arterial 32.5 mmol/L      Base Excess, Arterial 6.3 mmol/L      Comment: Serial Number: [id number]:  [id number]        O2 Saturation, Arterial 95.3 %      Hemoglobin, Blood Gas 14.8 g/dL      Hematocrit, Blood Gas 44.0 %      Barometric Pressure for Blood Gas 736.9000 mmHg      Modality Cannula     FIO2 28 %      Flow Rate 2.0000 lpm      Hemodilution No     PO2/FIO2 280    POC Lactate [[id number]]  (Normal) Collected: 05/04/25 0849    Specimen: Arterial Blood Updated: 05/04/25 0854     Lactate 1.1  mmol/L      Comment: Serial Number: [id number]:  [id number]       POC Electrolyte Panel [[id number]]  (Abnormal) Collected: 05/04/25 0849    Specimen: Arterial Blood Updated: 05/04/25 0854     Sodium 140 mmol/L      POC Potassium 3.6 mmol/L      Chloride 96 mmol/L      Ionized Calcium 1.22 mmol/L      Comment: Serial Number: [id number]:  [id number]       Blood Culture - Blood, Chest, Right [[id number]] Collected: 05/04/25 0851    Specimen: Blood from Chest, Right Updated: 05/04/25 0903    Blood Culture - Blood, Arm, Right [[id number]] Collected: 05/04/25 0857    Specimen: Blood from Arm, Right Updated: 05/04/25 0902    High Sensitivity Troponin T 1Hr [[id number]]  (Abnormal) Collected: 05/04/25 0920    Specimen: Blood Updated: 05/04/25 0943     HS Troponin T 42 ng/L      Troponin T Numeric Delta 6 ng/L      Troponin T % Delta 17    Narrative:      High Sensitive Troponin T Reference Range:  <14.0 ng/L- Negative Female for AMI  <22.0 ng/L- Negative Male for AMI  >=14 - Abnormal Female indicating possible myocardial injury.  >=22 - Abnormal Male indicating possible myocardial injury.   Clinicians would have to utilize clinical acumen, EKG, Troponin, and serial changes to determine if it is an Acute Myocardial Infarction or myocardial injury due to an underlying chronic condition.                  Imaging:    XR Chest 1 View  Result Date: 5/4/2025  XR CHEST 1 VW Date of Exam: 5/4/2025 9:10 AM EDT Indication: SOA Triage Protocol Comparison: Chest x-ray dated 4/22/2025 Findings: Left chest wall Port-A-Cath terminates overlying the SVC. Status post TAVR. Vague right upper lobe opacity is favored artifactual due to overlying structures. Right upper lobe infiltrate cannot be entirely excluded. Left lung appears adequately aerated. No significant pleural effusion or pneumothorax. The cardiomediastinal silhouette and pulmonary vasculature appear within normal limits. No acute or suspicious osseous lesion is identified. Surgical  clips project over the left upper thorax/axilla. Radiodense structure projects over the right upper abdomen.     Impression: 1.Vague right upper lobe opacity is favored artifactual due to overlapping structures. Right upper lobe infiltrate cannot be excluded. 2.Otherwise, no acute radiographic abnormality is identified. Electronically Signed: Jevon Baez MD  5/4/2025 10:14 AM EDT  Workstation ID: [id number]    CT Head Without Contrast  Result Date: 5/4/2025  CT HEAD WO CONTRAST Date of Exam: 5/4/2025 9:13 AM EDT Indication: ams. Comparison: 8/21/2024 Technique: Axial CT images were obtained of the head without contrast administration.  Reconstructed coronal and sagittal images were also obtained. Automated exposure control and iterative construction methods were used. Findings: No large territory infarct. There is no evidence of hemorrhage. No mass effect, edema or midline shift Unremarkable white matter No extra-axial fluid collection. The ventricles are normal in size and configuration. Partially empty sella. Otherwise the midline structures are unremarkable. The visualized orbits are unremarkable. The visualized paranasal sinuses and mastoid air cells are clear. The visualized soft tissues are unremarkable. No acute osseous abnormality.     Impression: No acute intracranial abnormality. Electronically Signed: Herrera Howe DO  5/4/2025 9:46 AM EDT  Workstation ID: [id number]        Differential Diagnosis and Discussion:    Altered Mental Status: Based on the patient's signs and symptoms, differential diagnosis includes but is not limited to meningitis, stroke, sepsis, subarachnoid hemorrhage, intracranial bleeding, encephalitis, and metabolic encephalopathy.  Dyspnea: Differential diagnosis includes but is not limited to metabolic acidosis, neurological disorders, psychogenic, asthma, pneumothorax, upper airway obstruction, COPD, pneumonia, noncardiogenic pulmonary edema, interstitial lung disease, anemia,  congestive heart failure, and pulmonary embolism    PROCEDURES:    Labs were collected in the emergency department and all labs were reviewed and interpreted by me.  X-ray were performed in the emergency department and all X-ray impressions were independently interpreted by me.  An EKG was performed and the EKG was interpreted by supervising attending.  CT scan was performed in the emergency department and the CT scan radiology impression was interpreted by me.    ECG 12 Lead ED Triage Standing Order; SOA   Preliminary Result   HEART HXOZ=501  bpm   RR Elzqfons=220  ms   NJ Paxxmurm=409  ms   P Horizontal Axis=-30  deg   P Front Axis=76  deg   QRSD Dpnoplcs=692  ms   QT Hwzpegwu=509  ms   PFtD=389  ms   QRS Axis=24  deg   T Wave Axis=153  deg   - ABNORMAL ECG -   Sinus tachycardia   Probable left atrial enlargement   Left bundle branch block   ST elevation secondary to IVCD   Date and Time of Study:2025-05-04 08:16:40          Procedures    MDM     Amount and/or Complexity of Data Reviewed  Clinical lab tests: reviewed  Tests in the radiology section of CPT®: reviewed  Tests in the medicine section of CPT®: reviewed  Decide to obtain previous medical records or to obtain history from someone other than the patient: yes         Initial blood glucose 382.  Anion gap within normal limits.  Give patient insulin.  Patient has a history of CHF so we held on fluids at this time.  pCO2 mildly elevated and patient difficult to arouse so we placed patient on BiPAP.  Chest x-ray showed .Vague right upper lobe opacity is favored artifactual due to overlapping structures. Right upper lobe infiltrate cannot be excluded.  CT head shows no acute intracranial abnormality.  Initial oxygen saturation was 86% on room air.  We will admit for COPD exacerbation.        Total Critical Care time of 35 minutes. Total critical care time documented does not include time spent on separately billed procedures for services of nurses or physician  assistants. I personally saw and examined the patient. I have reviewed all diagnostic interpretations and treatment plans as written. I was present for the key portions of any procedures performed and the inclusive time noted in any critical care statement. Critical care time includes patient management by me, time spent at the patients bedside,  time to review lab and imaging results, discussing patient care, documentation in the medical record, and time spent with family or caregiver.       Patient Care Considerations:          Consultants/Shared Management Plan:    I spoke with hospitalist Dr. Goyal who agrees to admit    Social Determinants of Health:          Disposition and Care Coordination:    Admit:   Through independent evaluation of the patient's history, physical, and imperical data, the patient meets criteria for inpatient admission to the hospital.        Final diagnoses:   COPD exacerbation        ED Disposition       ED Disposition   Decision to Admit    Condition   --    Comment   Level of Care: Telemetry [5]   Diagnosis: COPD exacerbation [755256]   Certification: I Certify That Inpatient Hospital Services Are Medically Necessary For Greater Than 2 Midnights                 This medical record created using voice recognition software.             Poncho Orozco PA-C  05/04/25 1052       Poncho Orozco PA-C  05/04/25 1101

## 2025-05-04 NOTE — H&P
[facility] HISTORY AND PHYSICAL  Date: 5/4/2025   Patient Name: Isha Llanes  Primary Care Physician:  Virgil Salas MD  Date of admission: 5/4/2025    Subjective   Subjective     Chief Complaint: Oversedated    HPI:    Isha Llanes is a 59 y.o. female with DM2, remote history of laryngeal cancer status post pain pump, chronic narcotic use, chronic severe anxiety on Xanax, Hodgkin's lymphoma, SCC, COPD, lupus, ABY, recent lung biopsy with frequent presentations to the emergency department presents to the emergency department today with lethargy and confusion.  The patient does have a BiPAP prescribed to her at home but does not wear it.  ER staff today indicates to me that she is there frequently due to noncompliance of the BiPAP mask.  ABG is actually better than usual today and she is more responsive than usual today but still is lethargic.  After in the BiPAP mask for several hours she does awaken spontaneously, states that she does not wear her BiPAP mask at home and this is why she is here.  Does not have any worsening cough than usual, does not feel short of breath.  Does request her usual medications and wishes to eat.  Is able to tolerate being off her BiPAP mask after a few hours.      Personal History     Past Medical History:  Past Medical History:   Diagnosis Date    Anesthesia     REPORTS HAS GOT REAL EMOTIONAL AND HAS BECOME ANGRY BEFORE    Anxiety     Aortic stenosis, severe     HAS BIO VALVE REPLACED    Arthritis     Asthma     Back pain     Blood clotting disorder     test to find out what type    Cancer     CHF (congestive heart failure)     Chronic low back pain with sciatica     Chronic pain disorder     COPD (chronic obstructive pulmonary disease)     Coronary artery disease     looking to follow with Cardio    Diabetes mellitus     TYPE 2    Dyspnea on effort     Fatty liver     GERD (gastroesophageal reflux disease)     H/O lumpectomy     H/O:  hysterectomy     Hiatal hernia     History of degenerative disc disease     History of kidney stones     History of pulmonary embolus (PE)     History of transfusion     AS A CHILD NO TRANSFUSION REACTION    Hodgkin's lymphoma     5/19/23  5 YEARS SINCE LAST CHEMO TX    Hypertension     Immobility     Lupus     Nausea vomiting and diarrhea 03/07/2024    Panic disorder     Pelvic pain     Peripheral neuropathy     Personal history of DVT (deep vein thrombosis)     over 20 years    PONV (postoperative nausea and vomiting)     Presence of intrathecal pump     PTSD (post-traumatic stress disorder)     Sacroiliac joint disease     SI (sacroiliac) joint inflammation     Venous insufficiency          Past Surgical History:  Past Surgical History:   Procedure Laterality Date    ABDOMINAL SURGERY      BACK SURGERY      SPINAL FUSION     BACK SURGERY      BLADDER REPAIR      BRONCHOSCOPY WITH ION ROBOTIC ASSIST N/A 4/22/2025    Procedure: BRONCHOSCOPY WITH ION ROBOT: REBUS, EBUS, NEEDLE ASPIRATES, BIOPSIES, BRUSHINGS, BAL, WASHINGS: insertion of lighted robot navigated instrument to view inside the lung;  Surgeon: Enzo Segal MD;  Location: [facility] MAIN OR;  Service: Robotics - Pulmonary;  Laterality: N/A;    CARDIAC CATHETERIZATION N/A 03/06/2019    Procedure: Valvuloplasty;  Surgeon: Wayne Johnson MD;  Location: [facility] CATH INVASIVE LOCATION;  Service: Cardiology    CARDIAC CATHETERIZATION      CARDIAC CATHETERIZATION Left 05/31/2023    Procedure: Cardiac Catheterization/Vascular Study;  Surgeon: Poncho Reid MD;  Location: [facility] CATH INVASIVE LOCATION;  Service: Cardiovascular;  Laterality: Left;    CARDIAC SURGERY      Mechanical valve    CARDIAC VALVE REPLACEMENT  2021    CHOLECYSTECTOMY      COLONOSCOPY N/A 12/29/2021    Procedure: COLONOSCOPY;  Surgeon: Karine Orlando MD;  Location: [facility] ENDOSCOPY;  Service: Gastroenterology;  Laterality: N/A;  POOR PREP    COLONOSCOPY N/A 04/13/2022     Procedure: COLONOSCOPY WITH POLYPECTOMY;  Surgeon: Karine Orlando MD;  Location: [facility] ENDOSCOPY;  Service: Gastroenterology;  Laterality: N/A;  COLON POLYP, HEMORRHOIDS, POOR PREP    COLONOSCOPY      DIRECT LARYNGOSCOPY, ESOPHAGOSCOPY, BRONCHOSCOPY N/A 05/22/2023    Procedure: DIRECT LARYNGOSCOPY WITH BIOPSIES , ESOPHAGOSCOPY, BRONCHOSCOPY;  Surgeon: Ronnie Mcgarry MD;  Location: [facility] OR OSC;  Service: ENT;  Laterality: N/A;    ENDOSCOPY N/A 12/29/2021    Procedure: ESOPHAGOGASTRODUODENOSCOPY;  Surgeon: Karine Orlando MD;  Location: [facility] ENDOSCOPY;  Service: Gastroenterology;  Laterality: N/A;  ESOPHAGITIS, GASTRITIS, HIATAL HERNIA    ENDOSCOPY      ENDOSCOPY N/A 04/16/2024    Procedure: ESOPHAGOGASTRODUODENOSCOPY WITH BIOPSIES;  Surgeon: Karine Orlando MD;  Location: [facility] ENDOSCOPY;  Service: Gastroenterology;  Laterality: N/A;  ESOPHAGITIS, HIATAL HERNIA    ENDOSCOPY W/ PEG TUBE PLACEMENT N/A 12/10/2024    Procedure: ESOPHAGOGASTRODUODENOSCOPY WITH PERCUTANEOUS ENDOSCOPIC GASTROSTOMY TUBE INSERTION WITH ANESTHESIA;  Surgeon: Rodger Ortega MD;  Location: [facility] ENDOSCOPY;  Service: Gastroenterology;  Laterality: N/A;  PEG TUBE INSERTION    HYSTERECTOMY      LYMPHADENECTOMY      PAIN PUMP INSERTION/REVISION N/A 10/18/2019    Procedure: PAIN PUMP INSERTION [facility] 10-18-19 [place];  Surgeon: Horace Rios MD;  Location: [facility];  Service: Pain Management    PAIN PUMP INSERTION/REVISION N/A 11/23/2020    Procedure: pain pump removal;  Surgeon: Horace Rios MD;  Location: [facility];  Service: Pain Management;  Laterality: N/A;    PEG TUBE INSERTION N/A 07/26/2023    Procedure: PERCUTANEOUS ENDOSCOPIC GASTROSTOMY TUBE INSERTION;  Surgeon: Koyd Mercer MD;  Location: [facility] ENDOSCOPY;  Service: General;  Laterality: N/A;  SUCCESSFUL PEG TUBE PLACE PLACEMENT    PORTACATH PLACEMENT      IN LEFT CHEST REPORTS THAT IT IS NOT FUNCTIONING    SKIN  BIOPSY      TONSILLECTOMY      TUBAL ABDOMINAL LIGATION      TUMOR REMOVAL      vaginal /anal area         Family History:   Family History   Problem Relation Age of Onset    Heart failure Mother     Anxiety disorder Mother     Prostate cancer Father     Depression Sister     Bipolar disorder Sister     Pancreatic cancer Sister     ADD / ADHD Brother     Thyroid cancer Maternal Grandmother     Pancreatic cancer Paternal Grandmother     ADD / ADHD Grandson     ADD / ADHD Granddaughter     ADD / ADHD Nephew     Malig Hyperthermia Neg Hx          Social History:   Social History     Socioeconomic History    Marital status: Legally    Tobacco Use    Smoking status: Every Day     Current packs/day: 2.00     Average packs/day: 2.0 packs/day for 46.3 years (92.7 ttl pk-yrs)     Types: Cigarettes     Start date: 1979     Passive exposure: Current    Smokeless tobacco: Never    Tobacco comments:     Currently smoking 1.5 daily. 04--js   Vaping Use    Vaping status: Never Used   Substance and Sexual Activity    Alcohol use: Never    Drug use: Never    Sexual activity: Not Currently       Home Medications:  ALPRAZolam, Insulin Lispro (1 Unit Dial), PARoxetine, acetaminophen, albuterol sulfate HFA, arformoterol, budesonide, famotidine, furosemide, ipratropium-albuterol, loperamide, metoprolol succinate XL, mupirocin, naloxone, ondansetron ODT, pain, polyethyl glycol-propyl glycol, and predniSONE    Allergies:  Allergies   Allergen Reactions    Amoxicillin Shortness Of Breath     Has tolerated Cefazolin, Ceftriaxone, and Cefepime -Jermaine Melida, [facility]    Ceclor [Cefaclor] Shortness Of Breath     Has tolerated Cefazolin, Ceftriaxone, and Cefepime -Jermaine Montez, [facility]      Penicillins Shortness Of Breath     Has tolerated Cefazolin, Ceftriaxone, and Cefepime -Jermaine Montez, [facility]    Sulfa Antibiotics Rash    Valium [Diazepam] Mental Status Change     DEPRESSED    Ambien [Zolpidem] Mental Status Change     Aspirin GI Intolerance    Ativan [Lorazepam] Mental Status Change    Benadryl [Diphenhydramine] Anxiety     AND MAKES HER HYPER    Biaxin [Clarithromycin] Unknown - Low Severity    Cephalexin Unknown - Low Severity    Clindamycin Unknown - Low Severity    Compazine [Prochlorperazine Edisylate] Rash    Contrast Dye (Echo Or Unknown Ct/Mr) Other (See Comments)     Caused pain in her arm    Doxycycline Rash    Nsaids GI Intolerance    Phenergan [Promethazine Hcl] GI Intolerance    Promethazine GI Intolerance    Vancomycin Itching       Objective   Objective     Vitals:   Temp:  [98.7 °F (37.1 °C)] 98.7 °F (37.1 °C)  Heart Rate:  [119-131] 122  Resp:  [16-19] 19  BP: (102-116)/(63-82) 102/63  Flow (L/min) (Oxygen Therapy):  [2] 2    Physical Exam   General: NAD, chronically ill-appearing female sitting up in bed  Eyes: Clear conjunctiva, PERRL EOMI  ENMT: mmm, edentulous nasal cannula in place  Resp: Breath sounds throughout no wheezes  CV: RRR no mrg, no LE edema  GI: abdomen is soft, NT, ND, +bs  Neuro: Moves all extremities spontaneously, sensation intact  Psych: AOx3, normal mood and affect      Result Review    Result Review:  I have personally reviewed the results from the time of this admission to 5/4/2025 10:45 EDT and agree with these findings:  [x]  Laboratory [id number] on repeat, glucose 212  []  Microbiology  [x]  Radiology chest x-ray no acute abnormality  []  EKG/Telemetry   []  Cardiology/Vascular   []  Pathology  []  Old records  []  Other:      Assessment & Plan   Assessment / Plan     Assessment:   Acute on chronic hypercapnic respiratory failure  Current workup pending for recurrent Hodgkin's lymphoma  Laryngeal cancer  1 cm right lower lobe nodule pending result  DM2  HTN  COPD  ABY  History of lupus  Aortic stenosis status post TAVR      Plan:     Admit to monitored bed  Xanax as needed per home prescription  Continue BiPAP at night and with naps if patient will allow  No antibiotics at this time  patient does not appear infected  Continue chronic steroids, Kasey Morales from home  Will resume home Paxil, Pepcid, metoprolol  Sliding-scale insulin for now, unclear if patient was taking home insulin  G-tube in place, patient's daughter informed us that the patient had this placed due to aspiration, does eat and drink at home and understands risk of aspiration.  Patient will be n.p.o. while in the hospital, speech therapy consulted.    DVT ppx: Lovenox  Diet: N.p.o. due to aspiration risk, tube feeds ordered per G-tube  Discussed with bedside RN and ER physician    Admission Status:  I believe this patient meets inpatient status.    Electronically signed by Bridget Goyal MD, 05/04/25, 10:45 AM EDT.

## 2025-05-04 NOTE — ED PROVIDER NOTES
"SHARED VISIT NOTE:    Patient is 59 y.o. year old female that presents to the ED for evaluation of shortness of air.     Physical Exam    ED Course:    /63 (BP Location: Right arm, Patient Position: Lying)   Pulse (!) 122   Temp 98.7 °F (37.1 °C) (Oral)   Resp 19   Ht 167.6 cm (66\")   Wt 60.8 kg (134 lb 0.6 oz)   LMP  (LMP Unknown)   SpO2 98%   BMI 21.63 kg/m²   Results for orders placed or performed during the hospital encounter of 05/04/25   ECG 12 Lead ED Triage Standing Order; SOA    Collection Time: 05/04/25  8:16 AM   Result Value Ref Range    QT Interval 325 ms    QTC Interval 465 ms   COVID-19, FLU A/B, RSV PCR 1 HR TAT - Swab, Nasopharynx    Collection Time: 05/04/25  8:18 AM    Specimen: Nasopharynx; Swab   Result Value Ref Range    COVID19 Not Detected Not Detected - Ref. Range    Influenza A PCR Not Detected Not Detected    Influenza B PCR Not Detected Not Detected    RSV, PCR Not Detected Not Detected   Comprehensive Metabolic Panel    Collection Time: 05/04/25  8:18 AM    Specimen: Blood   Result Value Ref Range    Glucose 382 (H) 65 - 99 mg/dL    BUN 15 6 - 20 mg/dL    Creatinine 0.54 (L) 0.57 - 1.00 mg/dL    Sodium 133 (L) 136 - 145 mmol/L    Potassium 3.9 3.5 - 5.2 mmol/L    Chloride 94 (L) 98 - 107 mmol/L    CO2 28.4 22.0 - 29.0 mmol/L    Calcium 9.2 8.6 - 10.5 mg/dL    Total Protein 7.0 6.0 - 8.5 g/dL    Albumin 3.9 3.5 - 5.2 g/dL    ALT (SGPT) 15 1 - 33 U/L    AST (SGOT) 15 1 - 32 U/L    Alkaline Phosphatase 118 (H) 39 - 117 U/L    Total Bilirubin 0.2 0.0 - 1.2 mg/dL    Globulin 3.1 gm/dL    A/G Ratio 1.3 g/dL    BUN/Creatinine Ratio 27.8 (H) 7.0 - 25.0    Anion Gap 10.6 5.0 - 15.0 mmol/L    eGFR 106.2 >60.0 mL/min/1.73   BNP    Collection Time: 05/04/25  8:18 AM    Specimen: Blood   Result Value Ref Range    proBNP 909.5 (H) 0.0 - 900.0 pg/mL   High Sensitivity Troponin T    Collection Time: 05/04/25  8:18 AM    Specimen: Blood   Result Value Ref Range    HS Troponin T 36 (H) <14 " ng/L   CBC Auto Differential    Collection Time: 05/04/25  8:18 AM    Specimen: Blood   Result Value Ref Range    WBC 12.37 (H) 3.40 - 10.80 10*3/mm3    RBC 5.30 (H) 3.77 - 5.28 10*6/mm3    Hemoglobin 13.9 12.0 - 15.9 g/dL    Hematocrit 46.6 34.0 - 46.6 %    MCV 87.9 79.0 - 97.0 fL    MCH 26.2 (L) 26.6 - 33.0 pg    MCHC 29.8 (L) 31.5 - 35.7 g/dL    RDW 14.7 12.3 - 15.4 %    RDW-SD 47.3 37.0 - 54.0 fl    MPV 11.0 6.0 - 12.0 fL    Platelets 150 140 - 450 10*3/mm3    Neutrophil % 88.3 (H) 42.7 - 76.0 %    Lymphocyte % 6.6 (L) 19.6 - 45.3 %    Monocyte % 3.9 (L) 5.0 - 12.0 %    Eosinophil % 0.1 (L) 0.3 - 6.2 %    Basophil % 0.2 0.0 - 1.5 %    Immature Grans % 0.9 (H) 0.0 - 0.5 %    Neutrophils, Absolute 10.93 (H) 1.70 - 7.00 10*3/mm3    Lymphocytes, Absolute 0.82 0.70 - 3.10 10*3/mm3    Monocytes, Absolute 0.48 0.10 - 0.90 10*3/mm3    Eosinophils, Absolute 0.01 0.00 - 0.40 10*3/mm3    Basophils, Absolute 0.02 0.00 - 0.20 10*3/mm3    Immature Grans, Absolute 0.11 (H) 0.00 - 0.05 10*3/mm3    nRBC 0.0 0.0 - 0.2 /100 WBC   Lactic Acid, Plasma    Collection Time: 05/04/25  8:18 AM    Specimen: Blood   Result Value Ref Range    Lactate 2.0 0.5 - 2.0 mmol/L   Acetone    Collection Time: 05/04/25  8:18 AM    Specimen: Blood   Result Value Ref Range    Acetone Negative Negative   Green Top (Gel)    Collection Time: 05/04/25  8:18 AM   Result Value Ref Range    Extra Tube Hold for add-ons.    Lavender Top    Collection Time: 05/04/25  8:18 AM   Result Value Ref Range    Extra Tube hold for add-on    Gold Top - SST    Collection Time: 05/04/25  8:18 AM   Result Value Ref Range    Extra Tube Hold for add-ons.    Light Blue Top    Collection Time: 05/04/25  8:18 AM   Result Value Ref Range    Extra Tube Hold for add-ons.    POC Glucose Once    Collection Time: 05/04/25  8:24 AM    Specimen: Blood   Result Value Ref Range    Glucose 368 (H) 70 - 99 mg/dL   Urinalysis With Microscopic If Indicated (No Culture) - Straight Cath     Collection Time: 05/04/25  8:38 AM    Specimen: Straight Cath; Urine   Result Value Ref Range    Color, UA Yellow Yellow, Straw    Appearance, UA Clear Clear    pH, UA <=5.0 5.0 - 8.0    Specific Gravity, UA >1.030 (H) 1.005 - 1.030    Glucose, UA >=1000 mg/dL (3+) (A) Negative    Ketones, UA Negative Negative    Bilirubin, UA Negative Negative    Blood, UA Negative Negative    Protein, UA Trace (A) Negative    Leuk Esterase, UA Negative Negative    Nitrite, UA Negative Negative    Urobilinogen, UA 0.2 E.U./dL 0.2 - 1.0 E.U./dL   Blood Gas, Arterial -    Collection Time: 05/04/25  8:49 AM    Specimen: Arterial Blood   Result Value Ref Range    Site Right Brachial     Shane's Test Positive     pH, Arterial 7.407 7.350 - 7.450 pH units    pCO2, Arterial 51.7 (H) 35.0 - 45.0 mm Hg    pO2, Arterial 78.4 (L) 80.0 - 100.0 mm Hg    HCO3, Arterial 32.5 (H) 22.0 - 26.0 mmol/L    Base Excess, Arterial 6.3 (H) -2.0 - 2.0 mmol/L    O2 Saturation, Arterial 95.3 95.0 - 99.0 %    Hemoglobin, Blood Gas 14.8 12 - 18 g/dL    Hematocrit, Blood Gas 44.0 38.0 - 51.0 %    Barometric Pressure for Blood Gas 736.9000 mmHg    Modality Cannula     FIO2 28 %    Flow Rate 2.0000 lpm    Hemodilution No     PO2/FIO2 280 0 - 500   POC Glucose Once    Collection Time: 05/04/25  8:49 AM    Specimen: Arterial Blood   Result Value Ref Range    Glucose 410 (H) 65 - 99 mg/dL   POC Lactate    Collection Time: 05/04/25  8:49 AM    Specimen: Arterial Blood   Result Value Ref Range    Lactate 1.1 <=2.0 mmol/L   POC Electrolyte Panel    Collection Time: 05/04/25  8:49 AM    Specimen: Arterial Blood   Result Value Ref Range    Sodium 140 131 - 143 mmol/L    POC Potassium 3.6 3.5 - 5.0 mmol/L    Chloride 96 (L) 98 - 107 mmol/L    Ionized Calcium 1.22 1.13 - 1.32 mmol/L   High Sensitivity Troponin T 1Hr    Collection Time: 05/04/25  9:20 AM    Specimen: Blood   Result Value Ref Range    HS Troponin T 42 (H) <14 ng/L    Troponin T Numeric Delta 6 ng/L     Troponin T % Delta 17 Abnormal if >/= 20%     *Note: Due to a large number of results and/or encounters for the requested time period, some results have not been displayed. A complete set of results can be found in Results Review.     Medications   sodium chloride 0.9 % flush 10 mL (has no administration in time range)   ipratropium-albuterol (DUO-NEB) nebulizer solution 3 mL (3 mL Nebulization Given 5/4/25 2093)   insulin regular (humuLIN R,novoLIN R) injection 7 Units (7 Units Intravenous Given 5/4/25 09)     XR Chest 1 View  Result Date: 5/4/2025  Narrative: XR CHEST 1 VW Date of Exam: 5/4/2025 9:10 AM EDT Indication: SOA Triage Protocol Comparison: Chest x-ray dated 4/22/2025 Findings: Left chest wall Port-A-Cath terminates overlying the SVC. Status post TAVR. Vague right upper lobe opacity is favored artifactual due to overlying structures. Right upper lobe infiltrate cannot be entirely excluded. Left lung appears adequately aerated. No significant pleural effusion or pneumothorax. The cardiomediastinal silhouette and pulmonary vasculature appear within normal limits. No acute or suspicious osseous lesion is identified. Surgical clips project over the left upper thorax/axilla. Radiodense structure projects over the right upper abdomen.     Impression: Impression: 1.Vague right upper lobe opacity is favored artifactual due to overlapping structures. Right upper lobe infiltrate cannot be excluded. 2.Otherwise, no acute radiographic abnormality is identified. Electronically Signed: Jevon Baez MD  5/4/2025 10:14 AM EDT  Workstation ID: [id number]    CT Head Without Contrast  Result Date: 5/4/2025  Narrative: CT HEAD WO CONTRAST Date of Exam: 5/4/2025 9:13 AM EDT Indication: ams. Comparison: 8/21/2024 Technique: Axial CT images were obtained of the head without contrast administration.  Reconstructed coronal and sagittal images were also obtained. Automated exposure control and iterative construction methods  were used. Findings: No large territory infarct. There is no evidence of hemorrhage. No mass effect, edema or midline shift Unremarkable white matter No extra-axial fluid collection. The ventricles are normal in size and configuration. Partially empty sella. Otherwise the midline structures are unremarkable. The visualized orbits are unremarkable. The visualized paranasal sinuses and mastoid air cells are clear. The visualized soft tissues are unremarkable. No acute osseous abnormality.     Impression: Impression: No acute intracranial abnormality. Electronically Signed: Herrera Howe DO  2025 9:46 AM EDT  Workstation ID: [id number]    XR Chest 1 View  Result Date: 2025  Narrative: XR CHEST 1 VW Date of Exam: 2025 11:05 AM EDT Indication: lethargic post opbronch Comparison: 3/28/2025 Findings: Cardiac size is stable. The pulmonary vascular markings are normal. There is a left-sided chest port in appropriate position. The patient has developed patchy airspace disease dependently in both lung bases. The upper lobes are clear. There is no pneumothorax identified.     Impression: Impression: Interval development of patchy airspace disease dependently in both lung bases. Electronically Signed: Jovany Franco MD  2025 11:18 AM EDT  Workstation ID: [id number]    FL Surgery Fluoro  Result Date: 2025  Narrative: This procedure was auto-finalized with no dictation required.    XR Chest 1 View  Result Date: 2025  Narrative: REVIEWING YOUR TEST RESULTS IN [facility] IS NOT A SUBSTITUTE FOR DISCUSSING THOSE RESULTS WITH YOUR HEALTH CARE PROVIDER. PLEASE CONTACT YOUR PROVIDER VIA [facility] TO DISCUSS ANY QUESTIONS OR CONCERNS YOU MAY HAVE REGARDING THESE TEST RESULTS.  RADIOLOGY REPORT FACILITY:  [facility] UNIT/AGE/GENDER: N.PAVPS  OP      AGE:59 Y          SEX:F PATIENT NAME/:  RENÉE ABARCA M    1965 UNIT NUMBER:  [id number] ACCOUNT NUMBER:  [id number] ACCESSION NUMBER:   [id number] PORTABLE CHEST, 04/07/2025 at 0901 hours HISTORY: Incorrect count, LEFT AXILLARY EXCISIONAL LYMPH NODE BIOPSY. COMPARISON: Chest x-ray performed on 1040 hours FINDINGS: On this one limited view of the chest the endotracheal tube tip is well above the abram. There is a left IJ Port-A-Cath. The right lateral lung has been cut off. Surgical clips are seen in the left axilla. Postsurgical injury segments, sponges or needles are demonstrated. Diamond in x-ray was called with these results on April 7, 2025 at 9:07 AM Dictated by: Karolyn Andrade M.D. Images and Report reviewed and interpreted by: Karolyn Andrade M.D. <PS><Electronically signed by: Karolyn Andrade M.D.> 04/07/2025 0908 D: 04/07/2025 0906 T: 04/07/2025 0906      MDM:    Procedures    Labs were collected in the emergency department and all labs were reviewed and interpreted by me.  X-ray were performed in the emergency department and all X-ray impressions were independently interpreted by me.  An EKG was performed and the EKG was interpreted by me.  CT scan was performed in the emergency department and the CT scan radiology impression was interpreted by me.    Critical Care Note: Total Critical Care time of 10 minutes. Total critical care time documented does not include time spent on separately billed procedures for services of nurses or physician assistants. I personally saw and examined the patient. I have reviewed all diagnostic interpretations and treatment plans as written. I was present for the key portions of any procedures performed and the inclusive time noted in any critical care statement. Critical care time includes patient management by me, time spent at the patients bedside,  time to review lab and imaging results, discussing patient care, documentation in the medical record, and time spent with family or caregiver.      SHARED VISIT ATTESTATION:    This visit was performed by both myself and an APC.  I performed the  substantive portion of the medical decision making. The management plan was made or approved by me, and I take responsibility for patient management.           Sebastian Gunn MD  11:37 EDT  05/04/25         Sebastian Gunn MD  05/04/25 1135

## 2025-05-04 NOTE — PAYOR COMM NOTE
"Isha Llanes (59 y.o. Female)              AUTHORIZATION REQUEST for EMERGENCY DEPARTMENT ADMISSION        PATIENT INFORMATION  Name:             Isha Llanes  MRN#:            [id number]        ADMISSION INFORMATION  CLASS:          Inpatient   DOS:               5/4/2025        ADMISSION DIAGNOSIS AND HOSPITAL PROBLEMS  ADMISSION DIAGNOSIS  COPD exacerbation [J44.1]        ADMITTING PROVIDER INFORMATION  Name/NPI       Bridget Goyal MD [[id number]]  Phone:            Phone: [phone]        RENDERING FACILITY  Name:             [facility]   NPI:                 [id number]  TID:                 [id number]  Address:        [facility] [address] [place] [address]        CASE MANAGEMENT CONTACT INFORMATION  Name:             Jane LIPSCOMB SRINI  Phone:            [phone]  Fax:                [phone]   Date of Birth   1965    Social Security Number       Address   [address] [facility] [address] [facility]  [address] [place], [place] [address]    Home Phone   [phone]    MRN   [id number]       Jainism   None    Marital Status   Legally                             Admission Date   5/4/2025    Admission Type   Emergency    Admitting Provider   Bridget Goyal MD    Attending Provider   Bridget Goyal MD    Department, Room/Bed   [facility] 2 [place], 255/1       Discharge Date       Discharge Disposition       Discharge Destination                                 Attending Provider: Bridget Goyal MD    Allergies: Amoxicillin, Ceclor [Cefaclor], Penicillins, Sulfa Antibiotics, Valium [Diazepam], Ambien [Zolpidem], Aspirin, Ativan [Lorazepam], Benadryl [Diphenhydramine], Biaxin [Clarithromycin], Cephalexin, Clindamycin, Compazine [Prochlorperazine Edisylate], Contrast Dye (Echo Or Unknown Ct/mr), Doxycycline, Nsaids, Phenergan [Promethazine Hcl], Promethazine, Vancomycin    Isolation: None   Infection: None   Code Status: CPR    Ht: 167.6 cm (66\")   Wt: 60.8 kg (134 lb 0.6 " oz)    Admission Cmt: None   Principal Problem: COPD exacerbation [J44.1]                   Active Insurance as of 5/4/2025       Primary Coverage       Payor Plan Insurance Group Employer/Plan Group    [id number] POLANCOEUG[employer]IA BY HEIDE [id number]       Payor Plan Address Payor Plan Phone Number Payor Plan Fax Number Effective Dates    [address] [address]   1/1/2021 - None Entered    [facility] [address]         Subscriber Name Subscriber Birth Date Member ID       RENÉE ABARCA 1965 [id number]                     Emergency Contacts        (Rel.) Home Phone Work Phone Mobile Phone    Sheila Watson (Daughter) [phone] -- [phone]           Chronic Obstructive Pulmonary Disease [facility] Inpatient [facility]       Indications Met   Last updated by Jane Li RN on 5/4/2025 1530     Review Status Created By   Primary Completed Jane Li RN      Criteria Review   Chronic Obstructive Pulmonary Disease [facility] Inpatient [facility]     Overall Determination: Indications Met     Criteria:  [×] Admission is indicated for  1 or more  of the following :      [  ] Hemodynamic instability          5/4/2025  3:30 PM              -- 5/4/2025  3:30 PM by Jane Li RN --                  98.7  128 129  131  128  121  122 114  20  76/51  SATS 91%                   TO ED WITH SOB,LETHARGY AND CONFUSION      [×] Altered mental status          5/4/2025  3:30 PM              -- 5/4/2025  3:30 PM by Jane Li RN --                                    (X) Altered mental status (ie, different from baseline), as indicated by  1 or more  of the following  (1) (2) (3) (4):                  (X) Confusional state (eg, disorientation, difficulty following commands, deficit in attention)                  (X) Lethargy (eg, awake or arousable, but with drowsiness; reduced awareness of self and environment)          5/4/2025  3:30 PM              -- 5/4/2025  3:30 PM by Jane Li RN --                   POSITIVE FOR LETHARY AND CONFUSION     Notes:  -- 5/4/2025  3:30 PM by Jane Li RN --      Subject: Admission                  Assessment:       ? Acute on chronic hypercapnic respiratory failure      ? Current workup pending for recurrent Hodgkin's lymphoma      ? Laryngeal cancer      ? 1 cm right lower lobe nodule pending result      ? DM2      ? HTN      ? COPD      ? ABY      ? History of lupus      ? Aortic stenosis status post TAVR                  Plan:            ? Admit to monitored bed      ? Xanax as needed per home prescription      ? Continue BiPAP at night and with naps if patient will allow      ? No antibiotics at this time patient does not appear infected      ? Continue chronic steroids, Brovana Pulmicort DuoNebs from home      ? Will resume home Paxil, Pepcid, metoprolol      ? Sliding-scale insulin for now, unclear if patient was taking home insulin      ? G-tube in place, patient's daughter informed us that the patient had this placed due to aspiration, does eat and drink at home and understands risk of aspiration. Patient will be n.p.o. while in the hospital, speech therapy consulted.            DVT ppx: Lovenox      Diet: N.p.o. due to aspiration risk, tube feeds ordered per G-tube      Discussed with bedside RN and ER physician            Admission Status: I believe this patient meets inpatient status.                                    History & Physical        Bridget Goyal MD at 05/04/25 [address] [place] [address] HISTORY AND PHYSICAL  Date: 5/4/2025   Patient Name: Isha Llanes  Primary Care Physician:  Virgil Salas MD  Date of admission: 5/4/2025    Subjective  Subjective     Chief Complaint: Oversedated    HPI:    Isha Llanes is a 59 y.o. female with DM2, remote history of laryngeal cancer status post pain pump, chronic narcotic use, chronic severe anxiety on Xanax, Hodgkin's lymphoma, SCC, COPD, lupus, ABY, recent lung biopsy  with frequent presentations to the emergency department presents to the emergency department today with lethargy and confusion.  The patient does have a BiPAP prescribed to her at home but does not wear it.  ER staff today indicates to me that she is there frequently due to noncompliance of the BiPAP mask.  ABG is actually better than usual today and she is more responsive than usual today but still is lethargic.  After in the BiPAP mask for several hours she does awaken spontaneously, states that she does not wear her BiPAP mask at home and this is why she is here.  Does not have any worsening cough than usual, does not feel short of breath.  Does request her usual medications and wishes to eat.  Is able to tolerate being off her BiPAP mask after a few hours.      Personal History     Past Medical History:  Past Medical History:   Diagnosis Date    Anesthesia     REPORTS HAS GOT REAL EMOTIONAL AND HAS BECOME ANGRY BEFORE    Anxiety     Aortic stenosis, severe     HAS BIO VALVE REPLACED    Arthritis     Asthma     Back pain     Blood clotting disorder     test to find out what type    Cancer     CHF (congestive heart failure)     Chronic low back pain with sciatica     Chronic pain disorder     COPD (chronic obstructive pulmonary disease)     Coronary artery disease     looking to follow with Cardio    Diabetes mellitus     TYPE 2    Dyspnea on effort     Fatty liver     GERD (gastroesophageal reflux disease)     H/O lumpectomy     H/O: hysterectomy     Hiatal hernia     History of degenerative disc disease     History of kidney stones     History of pulmonary embolus (PE)     History of transfusion     AS A CHILD NO TRANSFUSION REACTION    Hodgkin's lymphoma     5/19/23  5 YEARS SINCE LAST CHEMO TX    Hypertension     Immobility     Lupus     Nausea vomiting and diarrhea 03/07/2024    Panic disorder     Pelvic pain     Peripheral neuropathy     Personal history of DVT (deep vein thrombosis)     over 20 years     PONV (postoperative nausea and vomiting)     Presence of intrathecal pump     PTSD (post-traumatic stress disorder)     Sacroiliac joint disease     SI (sacroiliac) joint inflammation     Venous insufficiency          Past Surgical History:  Past Surgical History:   Procedure Laterality Date    ABDOMINAL SURGERY      BACK SURGERY      SPINAL FUSION     BACK SURGERY      BLADDER REPAIR      BRONCHOSCOPY WITH ION ROBOTIC ASSIST N/A 4/22/2025    Procedure: BRONCHOSCOPY WITH ION ROBOT: REBUS, EBUS, NEEDLE ASPIRATES, BIOPSIES, BRUSHINGS, BAL, WASHINGS: insertion of lighted robot navigated instrument to view inside the lung;  Surgeon: Enzo Segal MD;  Location: [facility] MAIN OR;  Service: Robotics - Pulmonary;  Laterality: N/A;    CARDIAC CATHETERIZATION N/A 03/06/2019    Procedure: Valvuloplasty;  Surgeon: Wayne Johnson MD;  Location: [facility] CATH INVASIVE LOCATION;  Service: Cardiology    CARDIAC CATHETERIZATION      CARDIAC CATHETERIZATION Left 05/31/2023    Procedure: Cardiac Catheterization/Vascular Study;  Surgeon: Poncho Reid MD;  Location: [facility] CATH INVASIVE LOCATION;  Service: Cardiovascular;  Laterality: Left;    CARDIAC SURGERY      Mechanical valve    CARDIAC VALVE REPLACEMENT  2021    CHOLECYSTECTOMY      COLONOSCOPY N/A 12/29/2021    Procedure: COLONOSCOPY;  Surgeon: Karine Orlando MD;  Location: [facility] ENDOSCOPY;  Service: Gastroenterology;  Laterality: N/A;  POOR PREP    COLONOSCOPY N/A 04/13/2022    Procedure: COLONOSCOPY WITH POLYPECTOMY;  Surgeon: Karine Orlando MD;  Location: [facility] ENDOSCOPY;  Service: Gastroenterology;  Laterality: N/A;  COLON POLYP, HEMORRHOIDS, POOR PREP    COLONOSCOPY      DIRECT LARYNGOSCOPY, ESOPHAGOSCOPY, BRONCHOSCOPY N/A 05/22/2023    Procedure: DIRECT LARYNGOSCOPY WITH BIOPSIES , ESOPHAGOSCOPY, BRONCHOSCOPY;  Surgeon: Ronnie Mcgarry MD;  Location: [facility] OR OSC;  Service: ENT;  Laterality: N/A;    ENDOSCOPY N/A 12/29/2021     Procedure: ESOPHAGOGASTRODUODENOSCOPY;  Surgeon: Karine Orlando MD;  Location: [facility] ENDOSCOPY;  Service: Gastroenterology;  Laterality: N/A;  ESOPHAGITIS, GASTRITIS, HIATAL HERNIA    ENDOSCOPY      ENDOSCOPY N/A 04/16/2024    Procedure: ESOPHAGOGASTRODUODENOSCOPY WITH BIOPSIES;  Surgeon: Karine Orlando MD;  Location: [facility] ENDOSCOPY;  Service: Gastroenterology;  Laterality: N/A;  ESOPHAGITIS, HIATAL HERNIA    ENDOSCOPY W/ PEG TUBE PLACEMENT N/A 12/10/2024    Procedure: ESOPHAGOGASTRODUODENOSCOPY WITH PERCUTANEOUS ENDOSCOPIC GASTROSTOMY TUBE INSERTION WITH ANESTHESIA;  Surgeon: Rodger Ortega MD;  Location: [facility] ENDOSCOPY;  Service: Gastroenterology;  Laterality: N/A;  PEG TUBE INSERTION    HYSTERECTOMY      LYMPHADENECTOMY      PAIN PUMP INSERTION/REVISION N/A 10/18/2019    Procedure: PAIN PUMP INSERTION [facility] 10-18-19 [place];  Surgeon: Horace Rios MD;  Location: [facility];  Service: Pain Management    PAIN PUMP INSERTION/REVISION N/A 11/23/2020    Procedure: pain pump removal;  Surgeon: Horace Rios MD;  Location: [facility] OR;  Service: Pain Management;  Laterality: N/A;    PEG TUBE INSERTION N/A 07/26/2023    Procedure: PERCUTANEOUS ENDOSCOPIC GASTROSTOMY TUBE INSERTION;  Surgeon: Kody Mercer MD;  Location: [facility] ENDOSCOPY;  Service: General;  Laterality: N/A;  SUCCESSFUL PEG TUBE PLACE PLACEMENT    PORTACATH PLACEMENT      IN LEFT CHEST REPORTS THAT IT IS NOT FUNCTIONING    SKIN BIOPSY      TONSILLECTOMY      TUBAL ABDOMINAL LIGATION      TUMOR REMOVAL      vaginal /anal area         Family History:   Family History   Problem Relation Age of Onset    Heart failure Mother     Anxiety disorder Mother     Prostate cancer Father     Depression Sister     Bipolar disorder Sister     Pancreatic cancer Sister     ADD / ADHD Brother     Thyroid cancer Maternal Grandmother     Pancreatic cancer Paternal Grandmother     ADD / ADHD Grandson     ADD / ADHD  Granddaughter     ADD / ADHD Nephew     Malig Hyperthermia Neg Hx          Social History:   Social History     Socioeconomic History    Marital status: Legally    Tobacco Use    Smoking status: Every Day     Current packs/day: 2.00     Average packs/day: 2.0 packs/day for 46.3 years (92.7 ttl pk-yrs)     Types: Cigarettes     Start date: 1979     Passive exposure: Current    Smokeless tobacco: Never    Tobacco comments:     Currently smoking 1.5 daily. 04--js   Vaping Use    Vaping status: Never Used   Substance and Sexual Activity    Alcohol use: Never    Drug use: Never    Sexual activity: Not Currently       Home Medications:  ALPRAZolam, Insulin Lispro (1 Unit Dial), PARoxetine, acetaminophen, albuterol sulfate HFA, arformoterol, budesonide, famotidine, furosemide, ipratropium-albuterol, loperamide, metoprolol succinate XL, mupirocin, naloxone, ondansetron ODT, pain, polyethyl glycol-propyl glycol, and predniSONE    Allergies:  Allergies   Allergen Reactions    Amoxicillin Shortness Of Breath     Has tolerated Cefazolin, Ceftriaxone, and Cefepime -Jermaine Melida, [facility]    Ceclor [Cefaclor] Shortness Of Breath     Has tolerated Cefazolin, Ceftriaxone, and Cefepime -Jermaine Melida, [facility]      Penicillins Shortness Of Breath     Has tolerated Cefazolin, Ceft[facility], and [facility]    Sulfa Antibiotics Rash    Valium [Diazepam] Mental Status Change     DEPRESSED    Ambien [Zolpidem] Mental Status Change    Aspirin GI Intolerance    Ativan [Lorazepam] Mental Status Change    Benadryl [Diphenhydramine] Anxiety     AND MAKES HER HYPER    Biaxin [Clarithromycin] Unknown - Low Severity    Cephalexin Unknown - Low Severity    Clindamycin Unknown - Low Severity    Compazine [Prochlorperazine Edisylate] Rash    Contrast Dye (Echo Or Unknown Ct/Mr) Other (See Comments)     Caused pain in her arm    Doxycycline Rash    Nsaids GI Intolerance    Phenergan [Promethazine Hcl] GI Intolerance     Promethazine GI Intolerance    Vancomycin Itching       Objective  Objective     Vitals:   Temp:  [98.7 °F (37.1 °C)] 98.7 °F (37.1 °C)  Heart Rate:  [119-131] 122  Resp:  [16-19] 19  BP: (102-116)/(63-82) 102/63  Flow (L/min) (Oxygen Therapy):  [2] 2    Physical Exam   General: NAD, chronically ill-appearing female sitting up in bed  Eyes: Clear conjunctiva, PERRL EOMI  ENMT: mmm, edentulous nasal cannula in place  Resp: Breath sounds throughout no wheezes  CV: RRR no mrg, no LE edema  GI: abdomen is soft, NT, ND, +bs  Neuro: Moves all extremities spontaneously, sensation intact  Psych: AOx3, normal mood and affect      Result Review   Result Review:  I have personally reviewed the results from the time of this admission to 5/4/2025 10:45 EDT and agree with these findings:  [x]  Laboratory [id number] on repeat, glucose 212  []  Microbiology  [x]  Radiology chest x-ray no acute abnormality  []  EKG/Telemetry   []  Cardiology/Vascular   []  Pathology  []  Old records  []  Other:      Assessment & Plan  Assessment / Plan     Assessment:   Acute on chronic hypercapnic respiratory failure  Current workup pending for recurrent Hodgkin's lymphoma  Laryngeal cancer  1 cm right lower lobe nodule pending result  DM2  HTN  COPD  ABY  History of lupus  Aortic stenosis status post TAVR      Plan:     Admit to monitored bed  Xanax as needed per home prescription  Continue BiPAP at night and with naps if patient will allow  No antibiotics at this time patient does not appear infected  Continue chronic steroids, Brovana Pulmicort DuoNebs from home  Will resume home Paxil, Pepcid, metoprolol  Sliding-scale insulin for now, unclear if patient was taking home insulin  G-tube in place, patient's daughter informed us that the patient had this placed due to aspiration, does eat and drink at home and understands risk of aspiration.  Patient will be n.p.o. while in the hospital, speech therapy consulted.    DVT ppx: Lovenox  Diet: N.p.o.  due to aspiration risk, tube feeds ordered per G-tube  Discussed with bedside RN and ER physician    Admission Status:  I believe this patient meets inpatient status.    Electronically signed by Bridget Goyal MD, 05/04/25, 10:45 AM EDT.       Electronically signed by Bridget Goyal MD at 05/04/25 1455          Emergency Department Notes        Poncho Orozco PA-C at 05/04/25 1040       Attestation signed by Sebastian Gunn MD at 05/04/25 1514          SHARED APC NON FACE TO FACE: I performed a substantive part of the MDM during the patient's E/M visit. I personally made or approved the documented management plan and acknowledge its risk of complications.   Sebastian Gunn MD 5/4/2025 15:14 EDT                             Time: 10:40 AM EDT  Date of encounter:  5/4/2025  Independent Historian/Clinical History and Information was obtained by:   Patient, Family, and EMS    History is limited by: N/A    Chief Complaint: Shortness of air      History of Present Illness:  Patient is a 59 y.o. year old female who presents to the emergency department for evaluation of shortness of air that began this morning as well as lethargy per family.  EMS states they arrived patient was 85% on room air.  Patient wears 2 L as needed.  Patient is a lung cancer patient.  Patient denies chest pain.      Patient Care Team  Primary Care Provider: Virgil Salas MD    Past Medical History:     Allergies   Allergen Reactions    Amoxicillin Shortness Of Breath     Has tolerated Cefazolin, Ceftriaxone, and Cefepime -Jermaine Melida, RPH    Ceclor [Cefaclor] Shortness Of Breath     Has tolerated Cefazolin, Ceftriaxone, and Cefepime -Jermaine Melida, RPH      Penicillins Shortness Of Breath     Has tolerated Cefazolin, Ceftriaxone, and Cefepime -Jermaine Melida, RPH    Sulfa Antibiotics Rash    Valium [Diazepam] Mental Status Change     DEPRESSED    Ambien [Zolpidem] Mental Status Change    Aspirin GI Intolerance    Ativan  [Lorazepam] Mental Status Change    Benadryl [Diphenhydramine] Anxiety     AND MAKES HER HYPER    Biaxin [Clarithromycin] Unknown - Low Severity    Cephalexin Unknown - Low Severity    Clindamycin Unknown - Low Severity    Compazine [Prochlorperazine Edisylate] Rash    Contrast Dye (Echo Or Unknown Ct/Mr) Other (See Comments)     Caused pain in her arm    Doxycycline Rash    Nsaids GI Intolerance    Phenergan [Promethazine Hcl] GI Intolerance    Promethazine GI Intolerance    Vancomycin Itching     Past Medical History:   Diagnosis Date    Anesthesia     REPORTS HAS GOT REAL EMOTIONAL AND HAS BECOME ANGRY BEFORE    Anxiety     Aortic stenosis, severe     HAS BIO VALVE REPLACED    Arthritis     Asthma     Back pain     Blood clotting disorder     test to find out what type    Cancer     CHF (congestive heart failure)     Chronic low back pain with sciatica     Chronic pain disorder     COPD (chronic obstructive pulmonary disease)     Coronary artery disease     looking to follow with Cardio    Diabetes mellitus     TYPE 2    Dyspnea on effort     Fatty liver     GERD (gastroesophageal reflux disease)     H/O lumpectomy     H/O: hysterectomy     Hiatal hernia     History of degenerative disc disease     History of kidney stones     History of pulmonary embolus (PE)     History of transfusion     AS A CHILD NO TRANSFUSION REACTION    Hodgkin's lymphoma     5/19/23  5 YEARS SINCE LAST CHEMO TX    Hypertension     Immobility     Lupus     Nausea vomiting and diarrhea 03/07/2024    Panic disorder     Pelvic pain     Peripheral neuropathy     Personal history of DVT (deep vein thrombosis)     over 20 years    PONV (postoperative nausea and vomiting)     Presence of intrathecal pump     PTSD (post-traumatic stress disorder)     Sacroiliac joint disease     SI (sacroiliac) joint inflammation     Venous insufficiency      Past Surgical History:   Procedure Laterality Date    ABDOMINAL SURGERY      BACK SURGERY      SPINAL  FUSION     BACK SURGERY      BLADDER REPAIR      BRONCHOSCOPY WITH ION ROBOTIC ASSIST N/A 4/22/2025    Procedure: BRONCHOSCOPY WITH ION ROBOT: REBUS, EBUS, NEEDLE ASPIRATES, BIOPSIES, BRUSHINGS, BAL, WASHINGS: insertion of lighted robot navigated instrument to view inside the lung;  Surgeon: Enzo Segal MD;  Location: [facility] MAIN OR;  Service: Robotics - Pulmonary;  Laterality: N/A;    CARDIAC CATHETERIZATION N/A 03/06/2019    Procedure: Valvuloplasty;  Surgeon: Wayne Johnson MD;  Location:  INVASIVE LOCATION;  Service: Cardiology    CARDIAC CATHETERIZATION      CARDIAC CATHETERIZATION Left 05/31/2023    Procedure: Cardiac Catheterization/Vascular Study;  Surgeon: Poncho Reid MD;  Location: [facility] CATH INVASIVE LOCATION;  Service: Cardiovascular;  Laterality: Left;    CARDIAC SURGERY      Mechanical valve    CARDIAC VALVE REPLACEMENT  2021    CHOLECYSTECTOMY      COLONOSCOPY N/A 12/29/2021    Procedure: COLONOSCOPY;  Surgeon: Karien Orlando MD;  Location: [facility] ENDOSCOPY;  Service: Gastroenterology;  Laterality: N/A;  POOR PREP    COLONOSCOPY N/A 04/13/2022    Procedure: COLONOSCOPY WITH POLYPECTOMY;  Surgeon: Karine Orlando MD;  Location: [facility] ENDOSCOPY;  Service: Gastroenterology;  Laterality: N/A;  COLON POLYP, HEMORRHOIDS, POOR PREP    COLONOSCOPY      DIRECT LARYNGOSCOPY, ESOPHAGOSCOPY, BRONCHOSCOPY N/A 05/22/2023    Procedure: DIRECT LARYNGOSCOPY WITH BIOPSIES , ESOPHAGOSCOPY, BRONCHOSCOPY;  Surgeon: Ronnie Mcgarry MD;  Location: [facility] OR OSC;  Service: ENT;  Laterality: N/A;    ENDOSCOPY N/A 12/29/2021    Procedure: ESOPHAGOGASTRODUODENOSCOPY;  Surgeon: Karine Orlando MD;  Location: [facility] ENDOSCOPY;  Service: Gastroenterology;  Laterality: N/A;  ESOPHAGITIS, GASTRITIS, HIATAL HERNIA    ENDOSCOPY      ENDOSCOPY N/A 04/16/2024    Procedure: ESOPHAGOGASTRODUODENOSCOPY WITH BIOPSIES;  Surgeon: Karine Orlando MD;  Location: [facility]  ENDOSCOPY;  Service: Gastroenterology;  Laterality: N/A;  ESOPHAGITIS, HIATAL HERNIA    ENDOSCOPY W/ PEG TUBE PLACEMENT N/A 12/10/2024    Procedure: ESOPHAGOGASTRODUODENOSCOPY WITH PERCUTANEOUS ENDOSCOPIC GASTROSTOMY TUBE INSERTION WITH ANESTHESIA;  Surgeon: Rodger Ortega MD;  Location: [facility] ENDOSCOPY;  Service: Gastroenterology;  Laterality: N/A;  PEG TUBE INSERTION    HYSTERECTOMY      LYMPHADENECTOMY      PAIN PUMP INSERTION/REVISION N/A 10/18/2019    Procedure: PAIN PUMP INSERTION [facility] 10-18-19 [place];  Surgeon: Horace Rios MD;  Location: [facility];  Service: Pain Management    PAIN PUMP INSERTION/REVISION N/A 11/23/2020    Procedure: pain pump removal;  Surgeon: Horace Rios MD;  Location: [facility];  Service: Pain Management;  Laterality: N/A;    PEG TUBE INSERTION N/A 07/26/2023    Procedure: PERCUTANEOUS ENDOSCOPIC GASTROSTOMY TUBE INSERTION;  Surgeon: Kody Mercer MD;  Location: [facility] ENDOSCOPY;  Service: General;  Laterality: N/A;  SUCCESSFUL PEG TUBE PLACE PLACEMENT    PORTACATH PLACEMENT      IN LEFT CHEST REPORTS THAT IT IS NOT FUNCTIONING    SKIN BIOPSY      TONSILLECTOMY      TUBAL ABDOMINAL LIGATION      TUMOR REMOVAL      vaginal /anal area     Family History   Problem Relation Age of Onset    Heart failure Mother     Anxiety disorder Mother     Prostate cancer Father     Depression Sister     Bipolar disorder Sister     Pancreatic cancer Sister     ADD / ADHD Brother     Thyroid cancer Maternal Grandmother     Pancreatic cancer Paternal Grandmother     ADD / ADHD Grandson     ADD / ADHD Granddaughter     ADD / ADHD Nephew     Malig Hyperthermia Neg Hx        Home Medications:  Prior to Admission medications    Medication Sig Start Date End Date Taking? Authorizing Provider   acetaminophen (Tylenol) 325 MG tablet Take 1 tablet by mouth Every 6 (Six) Hours As Needed for Mild Pain, Headache or Fever.    Provider, MD Nette   albuterol sulfate   (90 Base) MCG/ACT inhaler Inhale 2 puffs Every 4 (Four) Hours As Needed for Wheezing. 11/20/23   Katia Wright MD   ALPRAZolam (XANAX) 0.25 MG tablet Take 1 tablet by mouth 3 times a day. 11/25/24   Nette Jiménez MD   arformoterol (Brovana) 15 MCG/2ML nebulizer solution Take 2 mL by nebulization 2 (Two) Times a Day for 30 days. 4/9/25 5/9/25  Xochitl Estrada APRN   budesonide (Pulmicort) 0.5 MG/2ML nebulizer solution Take 2 mL by nebulization 2 (Two) Times a Day for 30 days. Rinse mouth out after each use 4/9/25 5/9/25  Xochitl Estrada APRN   famotidine (Pepcid) 40 MG tablet Take 1 tablet by mouth every night at bedtime. 3/27/25   Sobeida Espinosa APRN   furosemide (LASIX) 20 MG tablet Take 1 tablet by mouth Daily.    Nette Jiménez MD   Insulin Lispro, 1 Unit Dial, (HUMALOG) 100 UNIT/ML solution pen-injector Inject 14 Units under the skin into the appropriate area as directed Daily. 2/14/25   Nette Jiménez MD   ipratropium-albuterol (DUO-NEB) 0.5-2.5 mg/3 ml nebulizer Take 3 mL by nebulization 4 (Four) Times a Day As Needed for Wheezing for up to 30 days. 4/9/25 5/9/25  Xochitl Estrada APRN   loperamide (IMODIUM) 2 MG capsule Take 1 capsule by mouth 4 (Four) Times a Day As Needed for Diarrhea (2 with 1st episode of diarrhea and 1 with each additonal episode, max of 8 per day.). 4/21/25   Sobeida Espinosa APRN   metoprolol succinate XL (TOPROL-XL) 25 MG 24 hr tablet Take 1 tablet by mouth every night at bedtime.  Patient not taking: Reported on 4/25/2025 4/17/25   Fredis Chris MD   mupirocin (BACTROBAN) 2 % ointment Apply 1 Application topically to the appropriate area as directed 3 (Three) Times a Day. 4/10/24   Katia Wright MD   naloxone (NARCAN) 4 MG/0.1ML nasal spray Call 911. Don't prime. [place] in 1 nostril for overdose. Repeat in 2-3 minutes in other nostril if no or minimal breathing/responsiveness.  Patient not taking: Reported on 4/25/2025 12/12/24   Jin  MD Ruperto   ondansetron ODT (ZOFRAN-ODT) 8 MG disintegrating tablet Place 1 tablet on the tongue Daily As Needed. 3/27/25   Nette Jiménez MD   pain patient supplied pump by Intrathecal route Continuous. Type of Medication: Hydromorphone 15 mg/ml and Bupivacaine 0.5 mg/ml  [employer] [phone]  Provider:Dr. Boudreaux  Provider number: [phone]  Basal Dose: Hydromorphone 7.518 mg/day Bupivacaine 0.03772 mg/day  Pump capacity: 40ml  ( MAX of Hydromorphone 9.456 mg/ day; Bupivacaine 0.70455 mg /day)  Bolus Dose:Hydromorphone 0.500 mg, Bupivacaine 0.49789 mg  Max activations: 4 per day  Lockout 3 hours. 1 Bolus per every 3 hours   Last refill-  03/06/2025  Alarm date- 05/05/2025  Pump manufacture:MedManny Escobar MD   PARoxetine (PAXIL) 20 MG tablet Take 1 tablet by mouth Every Morning. 3/27/25   Nette Jiménez MD   predniSONE (DELTASONE) 20 MG tablet Take 2 tablets by mouth 2 (Two) Times a Day.  Patient taking differently: Take 15 mg by mouth 2 (Two) Times a Day. 15mg in am 15mg in pm 12/23/24   Goyo Nunez MD   Systane 0.4-0.3 % solution ophthalmic solution (artificial tears) Administer 1-2 drops to both eyes 4 (Four) Times a Day As Needed. 1/31/25   Nette Jiménez MD        Social History:   Social History     Tobacco Use    Smoking status: Every Day     Current packs/day: 2.00     Average packs/day: 2.0 packs/day for 46.3 years (92.7 ttl pk-yrs)     Types: Cigarettes     Start date: 1979     Passive exposure: Current    Smokeless tobacco: Never    Tobacco comments:     Currently smoking 1.5 daily. 04--js   Vaping Use    Vaping status: Never Used   Substance Use Topics    Alcohol use: Never    Drug use: Never         Review of Systems:  Review of Systems   Constitutional:  Negative for chills and fever.   HENT:  Negative for congestion, rhinorrhea and sore throat.    Eyes:  Negative for pain and visual disturbance.   Respiratory:  Positive for shortness of breath and  "wheezing. Negative for apnea, cough and chest tightness.    Cardiovascular:  Negative for chest pain and palpitations.   Gastrointestinal:  Negative for abdominal pain, diarrhea, nausea and vomiting.   Genitourinary:  Negative for difficulty urinating and dysuria.   Musculoskeletal:  Negative for joint swelling and myalgias.   Skin:  Negative for color change.   Neurological:  Negative for seizures and headaches.   Psychiatric/Behavioral:  Positive for decreased concentration.    All other systems reviewed and are negative.       Physical Exam:  /63 (BP Location: Right arm, Patient Position: Lying)   Pulse (!) 122   Temp 98.7 °F (37.1 °C) (Oral)   Resp 19   Ht 167.6 cm (66\")   Wt 60.8 kg (134 lb 0.6 oz)   LMP  (LMP Unknown)   SpO2 98%   BMI 21.63 kg/m²     Physical Exam  Vitals and nursing note reviewed.   Constitutional:       General: She is not in acute distress.     Appearance: Normal appearance. She is not toxic-appearing.      Comments: Arousal to verbal and pain stimuli    HENT:      Head: Normocephalic and atraumatic.      Jaw: There is normal jaw occlusion.   Eyes:      General: Lids are normal.      Extraocular Movements: Extraocular movements intact.      Conjunctiva/sclera: Conjunctivae normal.      Pupils: Pupils are equal, round, and reactive to light.   Cardiovascular:      Rate and Rhythm: Normal rate and regular rhythm.      Pulses: Normal pulses.      Heart sounds: Normal heart sounds.   Pulmonary:      Effort: Tachypnea present.      Breath sounds: Wheezing present. No rhonchi.   Abdominal:      General: Abdomen is flat.      Palpations: Abdomen is soft.      Tenderness: There is no abdominal tenderness. There is no guarding or rebound.   Musculoskeletal:         General: Normal range of motion.      Cervical back: Normal range of motion and neck supple.      Right lower leg: No edema.      Left lower leg: No edema.   Skin:     General: Skin is warm and dry.   Neurological:      " Mental Status: Mental status is at baseline.                    Medical Decision Making:      Comorbidities that affect care:    Copd, htn, asthma, cancer     External Notes reviewed:          The following orders were placed and all results were independently analyzed by me:  Orders Placed This Encounter   Procedures    COVID-19, FLU A/B, RSV PCR 1 HR TAT - Swab, Nasopharynx    Blood Culture - Blood,    Blood Culture - Blood,    Wound Culture - Swab, Leg, Right    XR Chest 1 View    CT Head Without Contrast    [place] Draw    Comprehensive Metabolic Panel    BNP    High Sensitivity Troponin T    CBC Auto Differential    Lactic Acid, Plasma    Arterial Blood Gas,H&H,Lytes,Lactate    Urinalysis With Microscopic If Indicated (No Culture) - Straight Cath    High Sensitivity Troponin T 1Hr    Acetone    Blood Gas, Arterial -    NPO Diet NPO Type: Strict NPO    Undress & Gown    Vital Signs    Continuous Pulse Oximetry    Inpatient Hospitalist Consult    Oxygen Therapy- Nasal Cannula; Titrate 1-6 LPM Per SpO2; 90 - 95%    NIPPV - Provider Settings    POC Glucose Once    POC Glucose Once    POC Lactate    POC Electrolyte Panel    ECG 12 Lead ED Triage Standing Order; SOA    Insert Peripheral IV    Inpatient Admission    CBC & Differential    Green Top (Gel)    Lavender Top    Gold Top - SST    Light Blue Top       Medications Given in the Emergency Department:  Medications   sodium chloride 0.9 % flush 10 mL (has no administration in time range)   ipratropium-albuterol (DUO-NEB) nebulizer solution 3 mL (3 mL Nebulization Given 5/4/25 0843)   insulin regular (humuLIN R,novoLIN R) injection 7 Units (7 Units Intravenous Given 5/4/25 0934)        ED Course:         Labs:    Lab Results (last 24 hours)       Procedure Component Value Units Date/Time    CBC & Differential [[id number]]  (Abnormal) Collected: 05/04/25 0818    Specimen: Blood Updated: 05/04/25 0825    Narrative:      The following orders were created for panel  order CBC & Differential.  Procedure                               Abnormality         Status                     ---------                               -----------         ------                     CBC Auto Differential[[id number]]        Abnormal            Final result                 Please view results for these tests on the individual orders.    Comprehensive Metabolic Panel [[id number]]  (Abnormal) Collected: 05/04/25 0818    Specimen: Blood Updated: 05/04/25 0846     Glucose 382 mg/dL      BUN 15 mg/dL      Creatinine 0.54 mg/dL      Sodium 133 mmol/L      Potassium 3.9 mmol/L      Chloride 94 mmol/L      CO2 28.4 mmol/L      Calcium 9.2 mg/dL      Total Protein 7.0 g/dL      Albumin 3.9 g/dL      ALT (SGPT) 15 U/L      AST (SGOT) 15 U/L      Alkaline Phosphatase 118 U/L      Total Bilirubin 0.2 mg/dL      Globulin 3.1 gm/dL      A/G Ratio 1.3 g/dL      BUN/Creatinine Ratio 27.8     Anion Gap 10.6 mmol/L      eGFR 106.2 mL/min/1.73     Narrative:      GFR Categories in Chronic Kidney Disease (CKD)              GFR Category          GFR (mL/min/1.73)    Interpretation  G1                    90 or greater        Normal or high (1)  G2                    60-89                Mild decrease (1)  G3a                   45-59                Mild to moderate decrease  G3b                   30-44                Moderate to severe decrease  G4                    15-29                Severe decrease  G5                    14 or less           Kidney failure    (1)In the absence of evidence of kidney disease, neither GFR category G1 or G2 fulfill the criteria for CKD.    eGFR calculation 2021 CKD-EPI creatinine equation, which does not include race as a factor    BNP [[id number]]  (Abnormal) Collected: 05/04/25 0818    Specimen: Blood Updated: 05/04/25 0843     proBNP 909.5 pg/mL     Narrative:      This assay is used as an aid in the diagnosis of individuals suspected of having heart failure. It can be used as an aid in  the diagnosis of acute decompensated heart failure (ADHF) in patients presenting with signs and symptoms of ADHF to the emergency department (ED). In addition, NT-proBNP of <300 pg/mL indicates ADHF is not likely.    Age Range Result Interpretation  NT-proBNP Concentration (pg/mL:      <50             Positive            >450                   Gray                 300-450                    Negative             <300    50-75           Positive            >900                  Gray                300-900                  Negative            <300      >75             Positive            >1800                  Gray                300-1800                  Negative            <300    High Sensitivity Troponin T [[id number]]  (Abnormal) Collected: 05/04/25 0818    Specimen: Blood Updated: 05/04/25 0846     HS Troponin T 36 ng/L     Narrative:      High Sensitive Troponin T Reference Range:  <14.0 ng/L- Negative Female for AMI  <22.0 ng/L- Negative Male for AMI  >=14 - Abnormal Female indicating possible myocardial injury.  >=22 - Abnormal Male indicating possible myocardial injury.   Clinicians would have to utilize clinical acumen, EKG, Troponin, and serial changes to determine if it is an Acute Myocardial Infarction or myocardial injury due to an underlying chronic condition.         CBC Auto Differential [[id number]]  (Abnormal) Collected: 05/04/25 0818    Specimen: Blood Updated: 05/04/25 0825     WBC 12.37 10*3/mm3      RBC 5.30 10*6/mm3      Hemoglobin 13.9 g/dL      Hematocrit 46.6 %      MCV 87.9 fL      MCH 26.2 pg      MCHC 29.8 g/dL      RDW 14.7 %      RDW-SD 47.3 fl      MPV 11.0 fL      Platelets 150 10*3/mm3      Neutrophil % 88.3 %      Lymphocyte % 6.6 %      Monocyte % 3.9 %      Eosinophil % 0.1 %      Basophil % 0.2 %      Immature Grans % 0.9 %      Neutrophils, Absolute 10.93 10*3/mm3      Lymphocytes, Absolute 0.82 10*3/mm3      Monocytes, Absolute 0.48 10*3/mm3      Eosinophils, Absolute 0.01  10*3/mm3      Basophils, Absolute 0.02 10*3/mm3      Immature Grans, Absolute 0.11 10*3/mm3      nRBC 0.0 /100 WBC     COVID-19, FLU A/B, RSV PCR 1 HR TAT - Swab, Nasopharynx [[id number]]  (Normal) Collected: 05/04/25 0818    Specimen: Swab from Nasopharynx Updated: 05/04/25 0918     COVID19 Not Detected     Influenza A PCR Not Detected     Influenza B PCR Not Detected     RSV, PCR Not Detected    Narrative:      Fact sheet for providers: https://www.fda.gov/media/055596/download    Fact sheet for patients: https://www.fda.gov/media/791711/download    Test performed by PCR.    Lactic Acid, Plasma [[id number]]  (Normal) Collected: 05/04/25 0818    Specimen: Blood Updated: 05/04/25 0844     Lactate 2.0 mmol/L     Acetone [[id number]]  (Normal) Collected: 05/04/25 0818    Specimen: Blood Updated: 05/04/25 0859     Acetone Negative    POC Glucose Once [[id number]]  (Abnormal) Collected: 05/04/25 0824    Specimen: Blood Updated: 05/04/25 0826     Glucose 368 mg/dL      Comment: Serial Number: [id number]:  [id number]       Urinalysis With Microscopic If Indicated (No Culture) - Straight Cath [[id number]]  (Abnormal) Collected: 05/04/25 0838    Specimen: Urine from Straight Cath Updated: 05/04/25 0855     Color, UA Yellow     Appearance, UA Clear     pH, UA <=5.0     Specific Gravity, UA >1.030     Glucose, UA >=1000 mg/dL (3+)     Ketones, UA Negative     Bilirubin, UA Negative     Blood, UA Negative     Protein, UA Trace     Leuk Esterase, UA Negative     Nitrite, UA Negative     Urobilinogen, UA 0.2 E.U./dL    Narrative:      Urine microscopic not indicated.    Wound Culture - Swab, Leg, Right [[id number]] Collected: 05/04/25 0842    Specimen: Swab from Leg, Right Updated: 05/04/25 0844    POC Glucose Once [[id number]]  (Abnormal) Collected: 05/04/25 0849    Specimen: Arterial Blood Updated: 05/04/25 0854     Glucose 410 mg/dL      Comment: Serial Number: [id number]:  [id number]       Blood Gas, Arterial - [[id number]]   (Abnormal) Collected: 05/04/25 0849    Specimen: Arterial Blood Updated: 05/04/25 0854     Site Right Brachial     Shane's Test Positive     pH, Arterial 7.407 pH units      pCO2, Arterial 51.7 mm Hg      pO2, Arterial 78.4 mm Hg      HCO3, Arterial 32.5 mmol/L      Base Excess, Arterial 6.3 mmol/L      Comment: Serial Number: [id number]:  [id number]        O2 Saturation, Arterial 95.3 %      Hemoglobin, Blood Gas 14.8 g/dL      Hematocrit, Blood Gas 44.0 %      Barometric Pressure for Blood Gas 736.9000 mmHg      Modality Cannula     FIO2 28 %      Flow Rate 2.0000 lpm      Hemodilution No     PO2/FIO2 280    POC Lactate [[id number]]  (Normal) Collected: 05/04/25 0849    Specimen: Arterial Blood Updated: 05/04/25 0854     Lactate 1.1 mmol/L      Comment: Serial Number: [id number]:  [id number]       POC Electrolyte Panel [[id number]]  (Abnormal) Collected: 05/04/25 0849    Specimen: Arterial Blood Updated: 05/04/25 0854     Sodium 140 mmol/L      POC Potassium 3.6 mmol/L      Chloride 96 mmol/L      Ionized Calcium 1.22 mmol/L      Comment: Serial Number: [id number]:  [id number]       Blood Culture - Blood, Chest, Right [[id number]] Collected: 05/04/25 0851    Specimen: Blood from Chest, Right Updated: 05/04/25 0903    Blood Culture - Blood, Arm, Right [[id number]] Collected: 05/04/25 0857    Specimen: Blood from Arm, Right Updated: 05/04/25 0902    High Sensitivity Troponin T 1Hr [[id number]]  (Abnormal) Collected: 05/04/25 0920    Specimen: Blood Updated: 05/04/25 0943     HS Troponin T 42 ng/L      Troponin T Numeric Delta 6 ng/L      Troponin T % Delta 17    Narrative:      High Sensitive Troponin T Reference Range:  <14.0 ng/L- Negative Female for AMI  <22.0 ng/L- Negative Male for AMI  >=14 - Abnormal Female indicating possible myocardial injury.  >=22 - Abnormal Male indicating possible myocardial injury.   Clinicians would have to utilize clinical acumen, EKG, Troponin, and serial changes to determine if it is an  Acute Myocardial Infarction or myocardial injury due to an underlying chronic condition.                  Imaging:    XR Chest 1 View  Result Date: 5/4/2025  XR CHEST 1 VW Date of Exam: 5/4/2025 9:10 AM EDT Indication: SOA Triage Protocol Comparison: Chest x-ray dated 4/22/2025 Findings: Left chest wall Port-A-Cath terminates overlying the SVC. Status post TAVR. Vague right upper lobe opacity is favored artifactual due to overlying structures. Right upper lobe infiltrate cannot be entirely excluded. Left lung appears adequately aerated. No significant pleural effusion or pneumothorax. The cardiomediastinal silhouette and pulmonary vasculature appear within normal limits. No acute or suspicious osseous lesion is identified. Surgical clips project over the left upper thorax/axilla. Radiodense structure projects over the right upper abdomen.     Impression: 1.Vague right upper lobe opacity is favored artifactual due to overlapping structures. Right upper lobe infiltrate cannot be excluded. 2.Otherwise, no acute radiographic abnormality is identified. Electronically Signed: Jevon Baez MD  5/4/2025 10:14 AM EDT  Workstation ID: [id number]    CT Head Without Contrast  Result Date: 5/4/2025  CT HEAD WO CONTRAST Date of Exam: 5/4/2025 9:13 AM EDT Indication: ams. Comparison: 8/21/2024 Technique: Axial CT images were obtained of the head without contrast administration.  Reconstructed coronal and sagittal images were also obtained. Automated exposure control and iterative construction methods were used. Findings: No large territory infarct. There is no evidence of hemorrhage. No mass effect, edema or midline shift Unremarkable white matter No extra-axial fluid collection. The ventricles are normal in size and configuration. Partially empty sella. Otherwise the midline structures are unremarkable. The visualized orbits are unremarkable. The visualized paranasal sinuses and mastoid air cells are clear. The visualized soft  tissues are unremarkable. No acute osseous abnormality.     Impression: No acute intracranial abnormality. Electronically Signed: Herrera Devenana lauraDO elissa  5/4/2025 9:46 AM EDT  Workstation ID: [id number]        Differential Diagnosis and Discussion:    Altered Mental Status: Based on the patient's signs and symptoms, differential diagnosis includes but is not limited to meningitis, stroke, sepsis, subarachnoid hemorrhage, intracranial bleeding, encephalitis, and metabolic encephalopathy.  Dyspnea: Differential diagnosis includes but is not limited to metabolic acidosis, neurological disorders, psychogenic, asthma, pneumothorax, upper airway obstruction, COPD, pneumonia, noncardiogenic pulmonary edema, interstitial lung disease, anemia, congestive heart failure, and pulmonary embolism    PROCEDURES:    Labs were collected in the emergency department and all labs were reviewed and interpreted by me.  X-ray were performed in the emergency department and all X-ray impressions were independently interpreted by me.  An EKG was performed and the EKG was interpreted by supervising attending.  CT scan was performed in the emergency department and the CT scan radiology impression was interpreted by me.    ECG 12 Lead ED Triage Standing Order; SOA   Preliminary Result   HEART GOVZ=731  bpm   RR Nzbxhpbq=710  ms   SC Dzwmbuud=935  ms   P Horizontal Axis=-30  deg   P Front Axis=76  deg   QRSD Zosehlys=180  ms   QT Gevrhafg=671  ms   YRwU=506  ms   QRS Axis=24  deg   T Wave Axis=153  deg   - ABNORMAL ECG -   Sinus tachycardia   Probable left atrial enlargement   Left bundle branch block   ST elevation secondary to IVCD   Date and Time of Study:2025-05-04 08:16:40          Procedures    MDM     Amount and/or Complexity of Data Reviewed  Clinical lab tests: reviewed  Tests in the radiology section of CPT®: reviewed  Tests in the medicine section of CPT®: reviewed  Decide to obtain previous medical records or to obtain history from  someone other than the patient: yes         Initial blood glucose 382.  Anion gap within normal limits.  Give patient insulin.  Patient has a history of CHF so we held on fluids at this time.  pCO2 mildly elevated and patient difficult to arouse so we placed patient on BiPAP.  Chest x-ray showed .Vague right upper lobe opacity is favored artifactual due to overlapping structures. Right upper lobe infiltrate cannot be excluded.  CT head shows no acute intracranial abnormality.  Initial oxygen saturation was 86% on room air.  We will admit for COPD exacerbation.        Total Critical Care time of 35 minutes. Total critical care time documented does not include time spent on separately billed procedures for services of nurses or physician assistants. I personally saw and examined the patient. I have reviewed all diagnostic interpretations and treatment plans as written. I was present for the key portions of any procedures performed and the inclusive time noted in any critical care statement. Critical care time includes patient management by me, time spent at the patients bedside,  time to review lab and imaging results, discussing patient care, documentation in the medical record, and time spent with family or caregiver.       Patient Care Considerations:          Consultants/Shared Management Plan:    I spoke with hospitalist Dr. Goyal who agrees to admit    Social Determinants of Health:          Disposition and Care Coordination:    Admit:   Through independent evaluation of the patient's history, physical, and imperical data, the patient meets criteria for inpatient admission to the hospital.        Final diagnoses:   COPD exacerbation        ED Disposition       ED Disposition   Decision to Admit    Condition   --    Comment   Level of Care: Telemetry [5]   Diagnosis: COPD exacerbation [770315]   Certification: I Certify That Inpatient Hospital Services Are Medically Necessary For Greater Than 2 Midnights       "           This medical record created using voice recognition software.             Poncho Orozco PA-C  05/04/25 1052       Poncho Orozco PA-C  05/04/25 1101      Electronically signed by Sebastian Gunn MD at 05/04/25 1514       Sebastian Gunn MD at 05/04/25 0808          SHARED VISIT NOTE:    Patient is 59 y.o. year old female that presents to the ED for evaluation of shortness of air.     Physical Exam    ED Course:    /63 (BP Location: Right arm, Patient Position: Lying)   Pulse (!) 122   Temp 98.7 °F (37.1 °C) (Oral)   Resp 19   Ht 167.6 cm (66\")   Wt 60.8 kg (134 lb 0.6 oz)   LMP  (LMP Unknown)   SpO2 98%   BMI 21.63 kg/m²   Results for orders placed or performed during the hospital encounter of 05/04/25   ECG 12 Lead ED Triage Standing Order; SOA    Collection Time: 05/04/25  8:16 AM   Result Value Ref Range    QT Interval 325 ms    QTC Interval 465 ms   COVID-19, FLU A/B, RSV PCR 1 HR TAT - Swab, Nasopharynx    Collection Time: 05/04/25  8:18 AM    Specimen: Nasopharynx; Swab   Result Value Ref Range    COVID19 Not Detected Not Detected - Ref. Range    Influenza A PCR Not Detected Not Detected    Influenza B PCR Not Detected Not Detected    RSV, PCR Not Detected Not Detected   Comprehensive Metabolic Panel    Collection Time: 05/04/25  8:18 AM    Specimen: Blood   Result Value Ref Range    Glucose 382 (H) 65 - 99 mg/dL    BUN 15 6 - 20 mg/dL    Creatinine 0.54 (L) 0.57 - 1.00 mg/dL    Sodium 133 (L) 136 - 145 mmol/L    Potassium 3.9 3.5 - 5.2 mmol/L    Chloride 94 (L) 98 - 107 mmol/L    CO2 28.4 22.0 - 29.0 mmol/L    Calcium 9.2 8.6 - 10.5 mg/dL    Total Protein 7.0 6.0 - 8.5 g/dL    Albumin 3.9 3.5 - 5.2 g/dL    ALT (SGPT) 15 1 - 33 U/L    AST (SGOT) 15 1 - 32 U/L    Alkaline Phosphatase 118 (H) 39 - 117 U/L    Total Bilirubin 0.2 0.0 - 1.2 mg/dL    Globulin 3.1 gm/dL    A/G Ratio 1.3 g/dL    BUN/Creatinine Ratio 27.8 (H) 7.0 - 25.0    Anion Gap 10.6 5.0 - 15.0 mmol/L    eGFR 106.2 >60.0 " mL/min/1.73   BNP    Collection Time: 05/04/25  8:18 AM    Specimen: Blood   Result Value Ref Range    proBNP 909.5 (H) 0.0 - 900.0 pg/mL   High Sensitivity Troponin T    Collection Time: 05/04/25  8:18 AM    Specimen: Blood   Result Value Ref Range    HS Troponin T 36 (H) <14 ng/L   CBC Auto Differential    Collection Time: 05/04/25  8:18 AM    Specimen: Blood   Result Value Ref Range    WBC 12.37 (H) 3.40 - 10.80 10*3/mm3    RBC 5.30 (H) 3.77 - 5.28 10*6/mm3    Hemoglobin 13.9 12.0 - 15.9 g/dL    Hematocrit 46.6 34.0 - 46.6 %    MCV 87.9 79.0 - 97.0 fL    MCH 26.2 (L) 26.6 - 33.0 pg    MCHC 29.8 (L) 31.5 - 35.7 g/dL    RDW 14.7 12.3 - 15.4 %    RDW-SD 47.3 37.0 - 54.0 fl    MPV 11.0 6.0 - 12.0 fL    Platelets 150 140 - 450 10*3/mm3    Neutrophil % 88.3 (H) 42.7 - 76.0 %    Lymphocyte % 6.6 (L) 19.6 - 45.3 %    Monocyte % 3.9 (L) 5.0 - 12.0 %    Eosinophil % 0.1 (L) 0.3 - 6.2 %    Basophil % 0.2 0.0 - 1.5 %    Immature Grans % 0.9 (H) 0.0 - 0.5 %    Neutrophils, Absolute 10.93 (H) 1.70 - 7.00 10*3/mm3    Lymphocytes, Absolute 0.82 0.70 - 3.10 10*3/mm3    Monocytes, Absolute 0.48 0.10 - 0.90 10*3/mm3    Eosinophils, Absolute 0.01 0.00 - 0.40 10*3/mm3    Basophils, Absolute 0.02 0.00 - 0.20 10*3/mm3    Immature Grans, Absolute 0.11 (H) 0.00 - 0.05 10*3/mm3    nRBC 0.0 0.0 - 0.2 /100 WBC   Lactic Acid, Plasma    Collection Time: 05/04/25  8:18 AM    Specimen: Blood   Result Value Ref Range    Lactate 2.0 0.5 - 2.0 mmol/L   Acetone    Collection Time: 05/04/25  8:18 AM    Specimen: Blood   Result Value Ref Range    Acetone Negative Negative   Green Top (Gel)    Collection Time: 05/04/25  8:18 AM   Result Value Ref Range    Extra Tube Hold for add-ons.    Lavender Top    Collection Time: 05/04/25  8:18 AM   Result Value Ref Range    Extra Tube hold for add-on    Gold Top - SST    Collection Time: 05/04/25  8:18 AM   Result Value Ref Range    Extra Tube Hold for add-ons.    Light Blue Top    Collection Time: 05/04/25   8:18 AM   Result Value Ref Range    Extra Tube Hold for add-ons.    POC Glucose Once    Collection Time: 05/04/25  8:24 AM    Specimen: Blood   Result Value Ref Range    Glucose 368 (H) 70 - 99 mg/dL   Urinalysis With Microscopic If Indicated (No Culture) - Straight Cath    Collection Time: 05/04/25  8:38 AM    Specimen: Straight Cath; Urine   Result Value Ref Range    Color, UA Yellow Yellow, Straw    Appearance, UA Clear Clear    pH, UA <=5.0 5.0 - 8.0    Specific Gravity, UA >1.030 (H) 1.005 - 1.030    Glucose, UA >=1000 mg/dL (3+) (A) Negative    Ketones, UA Negative Negative    Bilirubin, UA Negative Negative    Blood, UA Negative Negative    Protein, UA Trace (A) Negative    Leuk Esterase, UA Negative Negative    Nitrite, UA Negative Negative    Urobilinogen, UA 0.2 E.U./dL 0.2 - 1.0 E.U./dL   Blood Gas, Arterial -    Collection Time: 05/04/25  8:49 AM    Specimen: Arterial Blood   Result Value Ref Range    Site Right Brachial     Shane's Test Positive     pH, Arterial 7.407 7.350 - 7.450 pH units    pCO2, Arterial 51.7 (H) 35.0 - 45.0 mm Hg    pO2, Arterial 78.4 (L) 80.0 - 100.0 mm Hg    HCO3, Arterial 32.5 (H) 22.0 - 26.0 mmol/L    Base Excess, Arterial 6.3 (H) -2.0 - 2.0 mmol/L    O2 Saturation, Arterial 95.3 95.0 - 99.0 %    Hemoglobin, Blood Gas 14.8 12 - 18 g/dL    Hematocrit, Blood Gas 44.0 38.0 - 51.0 %    Barometric Pressure for Blood Gas 736.9000 mmHg    Modality Cannula     FIO2 28 %    Flow Rate 2.0000 lpm    Hemodilution No     PO2/FIO2 280 0 - 500   POC Glucose Once    Collection Time: 05/04/25  8:49 AM    Specimen: Arterial Blood   Result Value Ref Range    Glucose 410 (H) 65 - 99 mg/dL   POC Lactate    Collection Time: 05/04/25  8:49 AM    Specimen: Arterial Blood   Result Value Ref Range    Lactate 1.1 <=2.0 mmol/L   POC Electrolyte Panel    Collection Time: 05/04/25  8:49 AM    Specimen: Arterial Blood   Result Value Ref Range    Sodium 140 131 - 143 mmol/L    POC Potassium 3.6 3.5 - 5.0  mmol/L    Chloride 96 (L) 98 - 107 mmol/L    Ionized Calcium 1.22 1.13 - 1.32 mmol/L   High Sensitivity Troponin T 1Hr    Collection Time: 05/04/25  9:20 AM    Specimen: Blood   Result Value Ref Range    HS Troponin T 42 (H) <14 ng/L    Troponin T Numeric Delta 6 ng/L    Troponin T % Delta 17 Abnormal if >/= 20%     *Note: Due to a large number of results and/or encounters for the requested time period, some results have not been displayed. A complete set of results can be found in Results Review.     Medications   sodium chloride 0.9 % flush 10 mL (has no administration in time range)   ipratropium-albuterol (DUO-NEB) nebulizer solution 3 mL (3 mL Nebulization Given 5/4/25 0875)   insulin regular (humuLIN R,novoLIN R) injection 7 Units (7 Units Intravenous Given 5/4/25 0997)     XR Chest 1 View  Result Date: 5/4/2025  Narrative: XR CHEST 1 VW Date of Exam: 5/4/2025 9:10 AM EDT Indication: SOA Triage Protocol Comparison: Chest x-ray dated 4/22/2025 Findings: Left chest wall Port-A-Cath terminates overlying the SVC. Status post TAVR. Vague right upper lobe opacity is favored artifactual due to overlying structures. Right upper lobe infiltrate cannot be entirely excluded. Left lung appears adequately aerated. No significant pleural effusion or pneumothorax. The cardiomediastinal silhouette and pulmonary vasculature appear within normal limits. No acute or suspicious osseous lesion is identified. Surgical clips project over the left upper thorax/axilla. Radiodense structure projects over the right upper abdomen.     Impression: Impression: 1.Vague right upper lobe opacity is favored artifactual due to overlapping structures. Right upper lobe infiltrate cannot be excluded. 2.Otherwise, no acute radiographic abnormality is identified. Electronically Signed: Jevon Baez MD  5/4/2025 10:14 AM EDT  Workstation ID: [id number]    CT Head Without Contrast  Result Date: 5/4/2025  Narrative: CT HEAD WO CONTRAST Date of Exam:  5/4/2025 9:13 AM EDT Indication: ams. Comparison: 8/21/2024 Technique: Axial CT images were obtained of the head without contrast administration.  Reconstructed coronal and sagittal images were also obtained. Automated exposure control and iterative construction methods were used. Findings: No large territory infarct. There is no evidence of hemorrhage. No mass effect, edema or midline shift Unremarkable white matter No extra-axial fluid collection. The ventricles are normal in size and configuration. Partially empty sella. Otherwise the midline structures are unremarkable. The visualized orbits are unremarkable. The visualized paranasal sinuses and mastoid air cells are clear. The visualized soft tissues are unremarkable. No acute osseous abnormality.     Impression: Impression: No acute intracranial abnormality. Electronically Signed: Herrera Howe DO  5/4/2025 9:46 AM EDT  Workstation ID: [id number]    XR Chest 1 View  Result Date: 4/22/2025  Narrative: XR CHEST 1 VW Date of Exam: 4/22/2025 11:05 AM EDT Indication: lethargic post opbronch Comparison: 3/28/2025 Findings: Cardiac size is stable. The pulmonary vascular markings are normal. There is a left-sided chest port in appropriate position. The patient has developed patchy airspace disease dependently in both lung bases. The upper lobes are clear. There is no pneumothorax identified.     Impression: Impression: Interval development of patchy airspace disease dependently in both lung bases. Electronically Signed: Jovany Franco MD  4/22/2025 11:18 AM EDT  Workstation ID: [id number]    FL Surgery Fluoro  Result Date: 4/22/2025  Narrative: This procedure was auto-finalized with no dictation required.    XR Chest 1 View  Result Date: 4/7/2025  Narrative: REVIEWING YOUR TEST RESULTS IN [facility] IS NOT A SUBSTITUTE FOR DISCUSSING THOSE RESULTS WITH YOUR HEALTH CARE PROVIDER. PLEASE CONTACT YOUR PROVIDER VIA [employer] TO DISCUSS ANY QUESTIONS OR CONCERNS YOU  MAY HAVE REGARDING THESE TEST RESULTS.  RADIOLOGY REPORT FACILITY:  [facility] UNIT/AGE/GENDER: SUSANA  OP      AGE:59 Y          SEX:F PATIENT NAME/:  RENÉE ABARCA M    1965 UNIT NUMBER:  [id number] ACCOUNT NUMBER:  [id number] ACCESSION NUMBER:  [id number] PORTABLE CHEST, 2025 at 0901 hours HISTORY: Incorrect count, LEFT AXILLARY EXCISIONAL LYMPH NODE BIOPSY. COMPARISON: Chest x-ray performed on 1040 hours FINDINGS: On this one limited view of the chest the endotracheal tube tip is well above the abram. There is a left IJ Port-A-Cath. The right lateral lung has been cut off. Surgical clips are seen in the left axilla. Postsurgical injury segments, sponges or needles are demonstrated. Diamond in x-ray was called with these results on 2025 at 9:07 AM Dictated by: Karolyn Andrade M.D. Images and Report reviewed and interpreted by: Karolyn Andrade M.D. <PS><Electronically signed by: Karolyn Andrade M.D.> 2025 0908 D: 2025 0906 T: 2025 0906      MDM:    Procedures    Labs were collected in the emergency department and all labs were reviewed and interpreted by me.  X-ray were performed in the emergency department and all X-ray impressions were independently interpreted by me.  An EKG was performed and the EKG was interpreted by me.  CT scan was performed in the emergency department and the CT scan radiology impression was interpreted by me.    Critical Care Note: Total Critical Care time of 10 minutes. Total critical care time documented does not include time spent on separately billed procedures for services of nurses or physician assistants. I personally saw and examined the patient. I have reviewed all diagnostic interpretations and treatment plans as written. I was present for the key portions of any procedures performed and the inclusive time noted in any critical care statement. Critical care time includes patient management by me, time spent at the  patients bedside,  time to review lab and imaging results, discussing patient care, documentation in the medical record, and time spent with family or caregiver.      SHARED VISIT ATTESTATION:    This visit was performed by both myself and an APC.  I performed the substantive portion of the medical decision making. The management plan was made or approved by me, and I take responsibility for patient management.           Sebastian Gunn MD  11:37 EDT  05/04/25         Sebastian Gunn MD  05/04/25 1137      Electronically signed by Sebastian Gunn MD at 05/04/25 1137       Vital Signs (last day)       Date/Time Temp Temp src Pulse Resp BP Patient Position SpO2    05/04/25 1300 -- -- 118 -- -- -- --    05/04/25 1257 -- -- 107 -- -- -- --    05/04/25 1256 -- -- -- -- 105/67 -- --    05/04/25 1254 -- -- 121 -- -- -- --    05/04/25 1252 -- -- -- -- 110/74 -- --    05/04/25 1251 -- -- 116 -- -- -- --    05/04/25 1248 -- -- 120 -- 107/83 -- --    05/04/25 1245 -- -- 124 -- -- -- --    05/04/25 1243 -- -- -- -- 107/84 -- --    05/04/25 1242 -- -- 128 -- -- -- --    05/04/25 1241 -- -- -- -- 95/54 -- --    05/04/25 1239 -- -- 121 -- -- -- --    05/04/25 1237 -- -- 113 -- 114/69 -- --    05/04/25 1236 -- -- 123 -- -- -- --    05/04/25 1233 -- -- 120 -- -- -- --    05/04/25 1230 -- -- 121 -- -- -- --    05/04/25 1227 -- -- 126 -- 128/56 -- 97    05/04/25 1224 -- -- 120 -- -- -- --    05/04/25 1222 -- -- -- -- 117/72 -- --    05/04/25 1221 -- -- 114 -- -- -- --    05/04/25 1220 -- -- -- -- 100/62 -- --    05/04/25 1218 -- -- 120 -- -- -- --    05/04/25 1215 -- -- 113 -- -- -- --    05/04/25 1205 -- -- -- -- 80/38 -- --    05/04/25 1201 -- -- -- -- 88/30 -- --    05/04/25 1143 98.6 (37) Tympanic 114 20 76/51 Lying 97    05/04/25 1142 -- -- 112 -- -- -- --    05/04/25 1030 -- -- 122 19 102/63 Lying 98    05/04/25 09:38:40 -- -- 121 19 115/71 Lying 98    05/04/25 0931 -- -- 128 -- 115/71 -- 97    05/04/25 0904 -- -- 131 -- -- -- 92     05/04/25 0900 -- -- 129 19 116/82 Lying 96    05/04/25 0843 -- -- 119 18 -- Lying 96    05/04/25 08:22:59 -- -- -- -- -- -- 97    05/04/25 0815 98.7 (37.1) Oral 128 16 114/75 Lying 91          Lab Results (last 24 hours)       Procedure Component Value Units Date/Time    Wound Culture - Swab, Leg, Right [[id number]] Collected: 05/04/25 0842    Specimen: Swab from Leg, Right Updated: 05/04/25 1442     Gram Stain Few (2+) Gram positive cocci in pairs, chains and clusters      Rare (1+) Gram negative bacilli    POC Lactate [[id number]]  (Normal) Collected: 05/04/25 1252    Specimen: Arterial Blood Updated: 05/04/25 1256     Lactate 1.4 mmol/L      Comment: Serial Number: [id number]:  [id number]       POC Electrolyte Panel [[id number]]  (Abnormal) Collected: 05/04/25 1252    Specimen: Arterial Blood Updated: 05/04/25 1256     Sodium 142 mmol/L      POC Potassium 3.7 mmol/L      Chloride 97 mmol/L      Ionized Calcium 1.22 mmol/L      Comment: Serial Number: [id number]:  [id number]       POC Glucose Once [[id number]]  (Abnormal) Collected: 05/04/25 1252    Specimen: Arterial Blood Updated: 05/04/25 1256     Glucose 212 mg/dL      Comment: Serial Number: [id number]:  [id number]       Blood Gas, Arterial - [[id number]]  (Abnormal) Collected: 05/04/25 1252    Specimen: Arterial Blood Updated: 05/04/25 1256     Site Right Radial     Shane's Test Positive     pH, Arterial 7.413 pH units      pCO2, Arterial 52.7 mm Hg      pO2, Arterial 42.9 mm Hg      HCO3, Arterial 33.7 mmol/L      Base Excess, Arterial 7.3 mmol/L      Comment: Serial Number: [id number]:  [id number]        O2 Saturation, Arterial 77.9 %      Hemoglobin, Blood Gas 14.9 g/dL      Hematocrit, Blood Gas 44.0 %      Barometric Pressure for Blood Gas 738.2000 mmHg      Modality Cannula     FIO2 28 %      Flow Rate 2.0000 lpm      Notified Who Елена     Read Back Yes     Notified Time --     Hemodilution No     PO2/FIO2 153    POC Glucose Once [[id number]]  (Abnormal)  Collected: 05/04/25 1217    Specimen: Blood Updated: 05/04/25 1219     Glucose 223 mg/dL      Comment: Serial Number: [id number]:  [id number]       High Sensitivity Troponin T 1Hr [[id number]]  (Abnormal) Collected: 05/04/25 0920    Specimen: Blood Updated: 05/04/25 0943     HS Troponin T 42 ng/L      Troponin T Numeric Delta 6 ng/L      Troponin T % Delta 17    Narrative:      High Sensitive Troponin T Reference Range:  <14.0 ng/L- Negative Female for AMI  <22.0 ng/L- Negative Male for AMI  >=14 - Abnormal Female indicating possible myocardial injury.  >=22 - Abnormal Male indicating possible myocardial injury.   Clinicians would have to utilize clinical acumen, EKG, Troponin, and serial changes to determine if it is an Acute Myocardial Infarction or myocardial injury due to an underlying chronic condition.         COVID-19, FLU A/B, RSV PCR 1 HR TAT - Swab, Nasopharynx [[id number]]  (Normal) Collected: 05/04/25 0818    Specimen: Swab from Nasopharynx Updated: 05/04/25 0918     COVID19 Not Detected     Influenza A PCR Not Detected     Influenza B PCR Not Detected     RSV, PCR Not Detected    Narrative:      Fact sheet for providers: https://www.fda.gov/media/566133/download    Fact sheet for patients: https://www.fda.gov/media/581376/download    Test performed by PCR.    Blood Culture - Blood, Chest, Right [[id number]] Collected: 05/04/25 0851    Specimen: Blood from Chest, Right Updated: 05/04/25 0903    Blood Culture - Blood, Arm, Right [[id number]] Collected: 05/04/25 0857    Specimen: Blood from Arm, Right Updated: 05/04/25 0902    Acetone [[id number]]  (Normal) Collected: 05/04/25 0818    Specimen: Blood Updated: 05/04/25 0859     Acetone Negative    Urinalysis With Microscopic If Indicated (No Culture) - Straight Cath [[id number]]  (Abnormal) Collected: 05/04/25 0838    Specimen: Urine from Straight Cath Updated: 05/04/25 0855     Color, UA Yellow     Appearance, UA Clear     pH, UA <=5.0     Specific  Gravity, UA >1.030     Glucose, UA >=1000 mg/dL (3+)     Ketones, UA Negative     Bilirubin, UA Negative     Blood, UA Negative     Protein, UA Trace     Leuk Esterase, UA Negative     Nitrite, UA Negative     Urobilinogen, UA 0.2 E.U./dL    Narrative:      Urine microscopic not indicated.    Blood Gas, Arterial - [[id number]]  (Abnormal) Collected: 05/04/25 0849    Specimen: Arterial Blood Updated: 05/04/25 0854     Site Right Brachial     Shane's Test Positive     pH, Arterial 7.407 pH units      pCO2, Arterial 51.7 mm Hg      pO2, Arterial 78.4 mm Hg      HCO3, Arterial 32.5 mmol/L      Base Excess, Arterial 6.3 mmol/L      Comment: Serial Number: [id number]:  [id number]        O2 Saturation, Arterial 95.3 %      Hemoglobin, Blood Gas 14.8 g/dL      Hematocrit, Blood Gas 44.0 %      Barometric Pressure for Blood Gas 736.9000 mmHg      Modality Cannula     FIO2 28 %      Flow Rate 2.0000 lpm      Hemodilution No     PO2/FIO2 280    POC Lactate [[id number]]  (Normal) Collected: 05/04/25 0849    Specimen: Arterial Blood Updated: 05/04/25 0854     Lactate 1.1 mmol/L      Comment: Serial Number: [id number]:  [id number]       POC Electrolyte Panel [[id number]]  (Abnormal) Collected: 05/04/25 0849    Specimen: Arterial Blood Updated: 05/04/25 0854     Sodium 140 mmol/L      POC Potassium 3.6 mmol/L      Chloride 96 mmol/L      Ionized Calcium 1.22 mmol/L      Comment: Serial Number: [id number]:  [id number]       POC Glucose Once [[id number]]  (Abnormal) Collected: 05/04/25 0849    Specimen: Arterial Blood Updated: 05/04/25 0854     Glucose 410 mg/dL      Comment: Serial Number: [id number]:  [id number]       Comprehensive Metabolic Panel [[id number]]  (Abnormal) Collected: 05/04/25 0818    Specimen: Blood Updated: 05/04/25 0846     Glucose 382 mg/dL      BUN 15 mg/dL      Creatinine 0.54 mg/dL      Sodium 133 mmol/L      Potassium 3.9 mmol/L      Chloride 94 mmol/L      CO2 28.4 mmol/L      Calcium 9.2 mg/dL      Total Protein  7.0 g/dL      Albumin 3.9 g/dL      ALT (SGPT) 15 U/L      AST (SGOT) 15 U/L      Alkaline Phosphatase 118 U/L      Total Bilirubin 0.2 mg/dL      Globulin 3.1 gm/dL      A/G Ratio 1.3 g/dL      BUN/Creatinine Ratio 27.8     Anion Gap 10.6 mmol/L      eGFR 106.2 mL/min/1.73     Narrative:      GFR Categories in Chronic Kidney Disease (CKD)              GFR Category          GFR (mL/min/1.73)    Interpretation  G1                    90 or greater        Normal or high (1)  G2                    60-89                Mild decrease (1)  G3a                   45-59                Mild to moderate decrease  G3b                   30-44                Moderate to severe decrease  G4                    15-29                Severe decrease  G5                    14 or less           Kidney failure    (1)In the absence of evidence of kidney disease, neither GFR category G1 or G2 fulfill the criteria for CKD.    eGFR calculation 2021 CKD-EPI creatinine equation, which does not include race as a factor    High Sensitivity Troponin T [[id number]]  (Abnormal) Collected: 05/04/25 0818    Specimen: Blood Updated: 05/04/25 0846     HS Troponin T 36 ng/L     Narrative:      High Sensitive Troponin T Reference Range:  <14.0 ng/L- Negative Female for AMI  <22.0 ng/L- Negative Male for AMI  >=14 - Abnormal Female indicating possible myocardial injury.  >=22 - Abnormal Male indicating possible myocardial injury.   Clinicians would have to utilize clinical acumen, EKG, Troponin, and serial changes to determine if it is an Acute Myocardial Infarction or myocardial injury due to an underlying chronic condition.         Lactic Acid, Plasma [[id number]]  (Normal) Collected: 05/04/25 0818    Specimen: Blood Updated: 05/04/25 0844     Lactate 2.0 mmol/L     BNP [[id number]]  (Abnormal) Collected: 05/04/25 0818    Specimen: Blood Updated: 05/04/25 0843     proBNP 909.5 pg/mL     Narrative:      This assay is used as an aid in the diagnosis of  individuals suspected of having heart failure. It can be used as an aid in the diagnosis of acute decompensated heart failure (ADHF) in patients presenting with signs and symptoms of ADHF to the emergency department (ED). In addition, NT-proBNP of <300 pg/mL indicates ADHF is not likely.    Age Range Result Interpretation  NT-proBNP Concentration (pg/mL:      <50             Positive            >450                   Gray                 300-450                    Negative             <300    50-75           Positive            >900                  Gray                300-900                  Negative            <300      >75             Positive            >1800                  Gray                300-1800                  Negative            <300    [facility] Draw [[id number]] Collected: 05/04/25 0818    Specimen: Blood Updated: 05/04/25 0830    Narrative:      The following orders were created for panel order [facility] Draw.  Procedure                               Abnormality         Status                     ---------                               -----------         ------                     Green Top (Gel)[[id number]]                                  Final result               Lavender Top[[id number]]                                     Final result               Gold Top - SST[[id number]]                                   Final result               Light Blue Top[[id number]]                                   Final result                 Please view results for these tests on the individual orders.    Lavender Top [[id number]] Collected: 05/04/25 0818    Specimen: Blood Updated: 05/04/25 0830     Extra Tube hold for add-on     Comment: Auto resulted       Gold Top - SST [[id number]] Collected: 05/04/25 0818    Specimen: Blood Updated: 05/04/25 0830     Extra Tube Hold for add-ons.     Comment: Auto resulted.       Light Blue Top [[id number]] Collected: 05/04/25 0818    Specimen: Blood Updated: 05/04/25 0830     Extra  Tube Hold for add-ons.     Comment: Auto resulted       Green Top (Gel) [[id number]] Collected: 05/04/25 0818    Specimen: Blood Updated: 05/04/25 0830     Extra Tube Hold for add-ons.     Comment: Auto resulted.       POC Glucose Once [[id number]]  (Abnormal) Collected: 05/04/25 0824    Specimen: Blood Updated: 05/04/25 0826     Glucose 368 mg/dL      Comment: Serial Number: [id number]:  [id number]       CBC & Differential [[id number]]  (Abnormal) Collected: 05/04/25 0818    Specimen: Blood Updated: 05/04/25 0825    Narrative:      The following orders were created for panel order CBC & Differential.  Procedure                               Abnormality         Status                     ---------                               -----------         ------                     CBC Auto Differential[[id number]]        Abnormal            Final result                 Please view results for these tests on the individual orders.    CBC Auto Differential [[id number]]  (Abnormal) Collected: 05/04/25 0818    Specimen: Blood Updated: 05/04/25 0825     WBC 12.37 10*3/mm3      RBC 5.30 10*6/mm3      Hemoglobin 13.9 g/dL      Hematocrit 46.6 %      MCV 87.9 fL      MCH 26.2 pg      MCHC 29.8 g/dL      RDW 14.7 %      RDW-SD 47.3 fl      MPV 11.0 fL      Platelets 150 10*3/mm3      Neutrophil % 88.3 %      Lymphocyte % 6.6 %      Monocyte % 3.9 %      Eosinophil % 0.1 %      Basophil % 0.2 %      Immature Grans % 0.9 %      Neutrophils, Absolute 10.93 10*3/mm3      Lymphocytes, Absolute 0.82 10*3/mm3      Monocytes, Absolute 0.48 10*3/mm3      Eosinophils, Absolute 0.01 10*3/mm3      Basophils, Absolute 0.02 10*3/mm3      Immature Grans, Absolute 0.11 10*3/mm3      nRBC 0.0 /100 WBC           Imaging Results (Last 24 Hours)       Procedure Component Value Units Date/Time    XR Chest 1 View [[id number]] Collected: 05/04/25 1011     Updated: 05/04/25 1016    Narrative:      XR CHEST 1 VW    Date of Exam: 5/4/2025 9:10 AM  EDT    Indication: SOA Triage Protocol    Comparison: Chest x-ray dated 4/22/2025    Findings:  Left chest wall Port-A-Cath terminates overlying the SVC. Status post TAVR. Vague right upper lobe opacity is favored artifactual due to overlying structures. Right upper lobe infiltrate cannot be entirely excluded. Left lung appears adequately aerated.   No significant pleural effusion or pneumothorax. The cardiomediastinal silhouette and pulmonary vasculature appear within normal limits. No acute or suspicious osseous lesion is identified. Surgical clips project over the left upper thorax/axilla.   Radiodense structure projects over the right upper abdomen.      Impression:      Impression:  1.Vague right upper lobe opacity is favored artifactual due to overlapping structures. Right upper lobe infiltrate cannot be excluded.   2.Otherwise, no acute radiographic abnormality is identified.      Electronically Signed: Jevon Baez MD    5/4/2025 10:14 AM EDT    Workstation ID: [id number]    CT Head Without Contrast [[id number]] Collected: 05/04/25 0943     Updated: 05/04/25 0948    Narrative:      CT HEAD WO CONTRAST    Date of Exam: 5/4/2025 9:13 AM EDT    Indication: ams.    Comparison: 8/21/2024    Technique: Axial CT images were obtained of the head without contrast administration.  Reconstructed coronal and sagittal images were also obtained. Automated exposure control and iterative construction methods were used.      Findings:  No large territory infarct.    There is no evidence of hemorrhage.  No mass effect, edema or midline shift    Unremarkable white matter    No extra-axial fluid collection.    The ventricles are normal in size and configuration.  Partially empty sella. Otherwise the midline structures are unremarkable.    The visualized orbits are unremarkable.  The visualized paranasal sinuses and mastoid air cells are clear.    The visualized soft tissues are unremarkable.  No acute osseous abnormality.       Impression:      Impression:  No acute intracranial abnormality.        Electronically Signed: Herrera Howe DO    5/4/2025 9:46 AM EDT    Workstation ID: [id number]          ECG/EMG Results (last 24 hours)       Procedure Component Value Units Date/Time    ECG 12 Lead ED Triage Standing Order; SOA [[id number]] Collected: 05/04/25 0816     Updated: 05/04/25 0817     QT Interval 325 ms      QTC Interval 465 ms     Narrative:      HEART SKVI=705  bpm  RR Gvnitvce=817  ms  PA Bltzxlzt=627  ms  P Horizontal Axis=-30  deg  P Front Axis=76  deg  QRSD Mwbvjoui=132  ms  QT Qlttyduq=957  ms  JFoW=113  ms  QRS Axis=24  deg  T Wave Axis=153  deg  - ABNORMAL ECG -  Sinus tachycardia  Probable left atrial enlargement  Left bundle branch block  ST elevation secondary to IVCD  Date and Time of Study:2025-05-04 08:16:40          Physician Progress Notes (last 24 hours)  Notes from 05/03/25 1532 through 05/04/25 1532   No notes of this type exist for this encounter.       Consult Notes (last 24 hours)  Notes from 05/03/25 1532 through 05/04/25 1532   No notes of this type exist for this encounter.

## 2025-05-05 ENCOUNTER — READMISSION MANAGEMENT (OUTPATIENT)
Dept: CALL CENTER | Facility: HOSPITAL | Age: 60
End: 2025-05-05
Payer: COMMERCIAL

## 2025-05-05 VITALS
TEMPERATURE: 97.7 F | HEIGHT: 66 IN | BODY MASS INDEX: 21.54 KG/M2 | HEART RATE: 87 BPM | RESPIRATION RATE: 18 BRPM | OXYGEN SATURATION: 91 % | DIASTOLIC BLOOD PRESSURE: 77 MMHG | WEIGHT: 134.04 LBS | SYSTOLIC BLOOD PRESSURE: 109 MMHG

## 2025-05-05 LAB
ANION GAP SERPL CALCULATED.3IONS-SCNC: 14.7 MMOL/L (ref 5–15)
ATMOSPHERIC PRESS: 738.2 MMHG
BASE EXCESS BLDV CALC-SCNC: 7.3 MMOL/L (ref -2–2)
BDY SITE: ABNORMAL
BUN SERPL-MCNC: 11 MG/DL (ref 6–20)
BUN/CREAT SERPL: 28.2 (ref 7–25)
CA-I BLDA-SCNC: 1.22 MMOL/L (ref 1.13–1.32)
CALCIUM SPEC-SCNC: 8.9 MG/DL (ref 8.6–10.5)
CHLORIDE BLDA-SCNC: 97 MMOL/L (ref 98–107)
CHLORIDE SERPL-SCNC: 96 MMOL/L (ref 98–107)
CO2 SERPL-SCNC: 25.3 MMOL/L (ref 22–29)
CREAT SERPL-MCNC: 0.39 MG/DL (ref 0.57–1)
D-LACTATE SERPL-SCNC: 1.4 MMOL/L
DEPRECATED RDW RBC AUTO: 46.3 FL (ref 37–54)
EGFRCR SERPLBLD CKD-EPI 2021: 114.9 ML/MIN/1.73
ERYTHROCYTE [DISTWIDTH] IN BLOOD BY AUTOMATED COUNT: 14.6 % (ref 12.3–15.4)
GAS FLOW AIRWAY: 2 LPM
GAS FLOW AIRWAY: NORMAL L/MIN
GLUCOSE BLDC GLUCOMTR-MCNC: 153 MG/DL (ref 70–99)
GLUCOSE BLDC GLUCOMTR-MCNC: 153 MG/DL (ref 70–99)
GLUCOSE BLDC GLUCOMTR-MCNC: 212 MG/DL (ref 65–99)
GLUCOSE SERPL-MCNC: 164 MG/DL (ref 65–99)
HCO3 BLDV-SCNC: 33.7 MMOL/L (ref 22–26)
HCT VFR BLD AUTO: 44.7 % (ref 34–46.6)
HCT VFR BLD CALC: NORMAL %
HGB BLD-MCNC: 13.9 G/DL (ref 12–15.9)
HGB BLDA-MCNC: 14.9 G/DL (ref 12–18)
HGB BLDA-MCNC: NORMAL G/DL
INHALED O2 CONCENTRATION: 28 %
INHALED O2 CONCENTRATION: NORMAL %
MCH RBC QN AUTO: 26.9 PG (ref 26.6–33)
MCHC RBC AUTO-ENTMCNC: 31.1 G/DL (ref 31.5–35.7)
MCV RBC AUTO: 86.5 FL (ref 79–97)
MODALITY: ABNORMAL
NOTIFIED WHO: ABNORMAL
PCO2 BLDV: 52.7 MM HG (ref 41–51)
PH BLDV: 7.41 PH UNITS (ref 7.31–7.41)
PLATELET # BLD AUTO: 144 10*3/MM3 (ref 140–450)
PMV BLD AUTO: 10 FL (ref 6–12)
PO2 BLD: NORMAL MM[HG]
PO2 BLDV: 42.9 MM HG (ref 35–42)
POTASSIUM BLDA-SCNC: 3.7 MMOL/L (ref 3.5–5)
POTASSIUM SERPL-SCNC: 3.8 MMOL/L (ref 3.5–5.2)
RBC # BLD AUTO: 5.17 10*6/MM3 (ref 3.77–5.28)
SAO2 % BLDCOA: NORMAL %
SAO2 % BLDCOV: 77.9 % (ref 45–75)
SODIUM BLD-SCNC: 142 MMOL/L (ref 131–143)
SODIUM SERPL-SCNC: 136 MMOL/L (ref 136–145)
WBC NRBC COR # BLD AUTO: 7.45 10*3/MM3 (ref 3.4–10.8)

## 2025-05-05 PROCEDURE — 63710000001 INSULIN LISPRO (HUMAN) PER 5 UNITS: Performed by: INTERNAL MEDICINE

## 2025-05-05 PROCEDURE — 25010000002 HYDROMORPHONE 1 MG/ML SOLUTION: Performed by: INTERNAL MEDICINE

## 2025-05-05 PROCEDURE — 82948 REAGENT STRIP/BLOOD GLUCOSE: CPT | Performed by: INTERNAL MEDICINE

## 2025-05-05 PROCEDURE — 94761 N-INVAS EAR/PLS OXIMETRY MLT: CPT

## 2025-05-05 PROCEDURE — 82948 REAGENT STRIP/BLOOD GLUCOSE: CPT

## 2025-05-05 PROCEDURE — 94799 UNLISTED PULMONARY SVC/PX: CPT

## 2025-05-05 PROCEDURE — 92610 EVALUATE SWALLOWING FUNCTION: CPT

## 2025-05-05 PROCEDURE — 99239 HOSP IP/OBS DSCHRG MGMT >30: CPT | Performed by: INTERNAL MEDICINE

## 2025-05-05 PROCEDURE — 94664 DEMO&/EVAL PT USE INHALER: CPT

## 2025-05-05 PROCEDURE — 85027 COMPLETE CBC AUTOMATED: CPT | Performed by: INTERNAL MEDICINE

## 2025-05-05 PROCEDURE — 80048 BASIC METABOLIC PNL TOTAL CA: CPT | Performed by: INTERNAL MEDICINE

## 2025-05-05 PROCEDURE — 63710000001 PREDNISONE PER 5 MG: Performed by: INTERNAL MEDICINE

## 2025-05-05 RX ORDER — PREDNISONE 5 MG/1
15 TABLET ORAL 2 TIMES DAILY
Start: 2025-05-05

## 2025-05-05 RX ADMIN — Medication 10 ML: at 09:36

## 2025-05-05 RX ADMIN — INSULIN LISPRO 3 UNITS: 100 INJECTION, SOLUTION INTRAVENOUS; SUBCUTANEOUS at 00:01

## 2025-05-05 RX ADMIN — PREDNISONE 15 MG: 10 TABLET ORAL at 09:34

## 2025-05-05 RX ADMIN — HYDROMORPHONE HYDROCHLORIDE 1 MG: 1 INJECTION, SOLUTION INTRAMUSCULAR; INTRAVENOUS; SUBCUTANEOUS at 14:36

## 2025-05-05 RX ADMIN — ALPRAZOLAM 0.25 MG: 0.25 TABLET ORAL at 10:39

## 2025-05-05 RX ADMIN — ARFORMOTEROL TARTRATE 15 MCG: 15 SOLUTION RESPIRATORY (INHALATION) at 07:35

## 2025-05-05 RX ADMIN — HYDROMORPHONE HYDROCHLORIDE 1 MG: 1 INJECTION, SOLUTION INTRAMUSCULAR; INTRAVENOUS; SUBCUTANEOUS at 10:47

## 2025-05-05 RX ADMIN — BUDESONIDE 0.5 MG: 0.5 SUSPENSION RESPIRATORY (INHALATION) at 07:35

## 2025-05-05 RX ADMIN — FAMOTIDINE 40 MG: 20 TABLET, FILM COATED ORAL at 09:34

## 2025-05-05 NOTE — SIGNIFICANT NOTE
Wound Eval / Progress Noted    [facility]     Patient Name: Isha Llanes  : 1965  MRN: [id number]  Today's Date: 2025                 Admit Date: 2025    Visit Dx:    ICD-10-CM ICD-9-CM   1. COPD exacerbation  J44.1 491.21   2. Oropharyngeal dysphagia  R13.12 787.22   3. Chronic respiratory failure with hypoxia and hypercapnia  J96.11 518.83    J96.12 799.02     786.09         COPD exacerbation        Past Medical History:   Diagnosis Date    Anesthesia     REPORTS HAS GOT REAL EMOTIONAL AND HAS BECOME ANGRY BEFORE    Anxiety     Aortic stenosis, severe     HAS BIO VALVE REPLACED    Arthritis     Asthma     Back pain     Blood clotting disorder     test to find out what type    Cancer     CHF (congestive heart failure)     Chronic low back pain with sciatica     Chronic pain disorder     COPD (chronic obstructive pulmonary disease)     Coronary artery disease     looking to follow with Cardio    Diabetes mellitus     TYPE 2    Dyspnea on effort     Fatty liver     GERD (gastroesophageal reflux disease)     H/O lumpectomy     H/O: hysterectomy     Hiatal hernia     History of degenerative disc disease     History of kidney stones     History of pulmonary embolus (PE)     History of transfusion     AS A CHILD NO TRANSFUSION REACTION    Hodgkin's lymphoma     23  5 YEARS SINCE LAST CHEMO TX    Hypertension     Immobility     Lupus     Nausea vomiting and diarrhea 2024    Panic disorder     Pelvic pain     Peripheral neuropathy     Personal history of DVT (deep vein thrombosis)     over 20 years    PONV (postoperative nausea and vomiting)     Presence of intrathecal pump     PTSD (post-traumatic stress disorder)     Sacroiliac joint disease     SI (sacroiliac) joint inflammation     Venous insufficiency         Past Surgical History:   Procedure Laterality Date    ABDOMINAL SURGERY      BACK SURGERY      SPINAL FUSION     BACK SURGERY      BLADDER REPAIR      BRONCHOSCOPY WITH ION  ROBOTIC ASSIST N/A 4/22/2025    Procedure: BRONCHOSCOPY WITH ION ROBOT: REBUS, EBUS, NEEDLE ASPIRATES, BIOPSIES, BRUSHINGS, BAL, WASHINGS: insertion of lighted robot navigated instrument to view inside the lung;  Surgeon: Enzo Segal MD;  Location: [facility] MAIN OR;  Service: Robotics - Pulmonary;  Laterality: N/A;    CARDIAC CATHETERIZATION N/A 03/06/2019    Procedure: Valvuloplasty;  Surgeon: Wayne Johnson MD;  Location: [facility] CATH INVASIVE LOCATION;  Service: Cardiology    CARDIAC CATHETERIZATION      CARDIAC CATHETERIZATION Left 05/31/2023    Procedure: Cardiac Catheterization/Vascular Study;  Surgeon: Poncho Reid MD;  Location: [facility] CATH INVASIVE LOCATION;  Service: Cardiovascular;  Laterality: Left;    CARDIAC SURGERY      Mechanical valve    CARDIAC VALVE REPLACEMENT  2021    CHOLECYSTECTOMY      COLONOSCOPY N/A 12/29/2021    Procedure: COLONOSCOPY;  Surgeon: Karine Orlando MD;  Location: [facility] ENDOSCOPY;  Service: Gastroenterology;  Laterality: N/A;  POOR PREP    COLONOSCOPY N/A 04/13/2022    Procedure: COLONOSCOPY WITH POLYPECTOMY;  Surgeon: Karine Orlando MD;  Location: [facility] ENDOSCOPY;  Service: Gastroenterology;  Laterality: N/A;  COLON POLYP, HEMORRHOIDS, POOR PREP    COLONOSCOPY      DIRECT LARYNGOSCOPY, ESOPHAGOSCOPY, BRONCHOSCOPY N/A 05/22/2023    Procedure: DIRECT LARYNGOSCOPY WITH BIOPSIES , ESOPHAGOSCOPY, BRONCHOSCOPY;  Surgeon: Ronnie Mcgarry MD;  Location: [facility] OR OSC;  Service: ENT;  Laterality: N/A;    ENDOSCOPY N/A 12/29/2021    Procedure: ESOPHAGOGASTRODUODENOSCOPY;  Surgeon: Karine Orlando MD;  Location: [facility] ENDOSCOPY;  Service: Gastroenterology;  Laterality: N/A;  ESOPHAGITIS, GASTRITIS, HIATAL HERNIA    ENDOSCOPY      ENDOSCOPY N/A 04/16/2024    Procedure: ESOPHAGOGASTRODUODENOSCOPY WITH BIOPSIES;  Surgeon: Karine Orlando MD;  Location: [facility] ENDOSCOPY;  Service: Gastroenterology;  Laterality: N/A;  ESOPHAGITIS,  HIATAL HERNIA    ENDOSCOPY W/ PEG TUBE PLACEMENT N/A 12/10/2024    Procedure: ESOPHAGOGASTRODUODENOSCOPY WITH PERCUTANEOUS ENDOSCOPIC GASTROSTOMY TUBE INSERTION WITH ANESTHESIA;  Surgeon: Rodger Ortega MD;  Location: [facility] ENDOSCOPY;  Service: Gastroenterology;  Laterality: N/A;  PEG TUBE INSERTION    HYSTERECTOMY      LYMPHADENECTOMY      PAIN PUMP INSERTION/REVISION N/A 10/18/2019    Procedure: PAIN PUMP INSERTION [facility] 10-18-19 [place];  Surgeon: Horace Rios MD;  Location: [facility];  Service: Pain Management    PAIN PUMP INSERTION/REVISION N/A 11/23/2020    Procedure: pain pump removal;  Surgeon: Horace Rios MD;  Location: [facility] OR;  Service: Pain Management;  Laterality: N/A;    PEG TUBE INSERTION N/A 07/26/2023    Procedure: PERCUTANEOUS ENDOSCOPIC GASTROSTOMY TUBE INSERTION;  Surgeon: Kody Mercer MD;  Location: [facility] ENDOSCOPY;  Service: General;  Laterality: N/A;  SUCCESSFUL PEG TUBE PLACE PLACEMENT    PORTACATH PLACEMENT      IN LEFT CHEST REPORTS THAT IT IS NOT FUNCTIONING    SKIN BIOPSY      TONSILLECTOMY      TUBAL ABDOMINAL LIGATION      TUMOR REMOVAL      vaginal /anal area         Physical Assessment:  Wound 01/02/25 1154 Right lateral leg (Active)   Wound Image   05/05/25 1430   Dressing Appearance dry;intact 05/05/25 1430   Closure None 05/05/25 1430   Base moist;red;yellow;slough;dry;tan 05/05/25 1430   Periwound dry;maroon/purple 05/05/25 1430   Periwound Temperature warm 05/05/25 1430   Periwound Skin Turgor soft 05/05/25 1430   Edges open 05/05/25 1430   Drainage Characteristics/Odor serosanguineous 05/05/25 1430   Drainage Amount scant 05/05/25 1430   Care, Wound cleansed with;sterile normal saline 05/05/25 1430   Dressing Care dressing removed;dressing applied;silver impregnated;hydrofiber;abdominal pad;gauze, dry;tubular wrap 05/05/25 1430   Periwound Care absorptive dressing applied 05/05/25 1430       Wound 01/02/25 1158 Left distal calf (Active)    Wound Image   05/05/25 1430   Dressing Appearance open to air 05/05/25 1430   Closure None 05/05/25 1430   Base dry;red;brown 05/05/25 1430   Periwound dry;maroon/purple 05/05/25 1430   Periwound Temperature warm 05/05/25 1430   Periwound Skin Turgor soft 05/05/25 1430   Edges open 05/05/25 1430   Wound Length (cm) 3.2 cm 05/05/25 1430   Wound Width (cm) 1 cm 05/05/25 1430   Wound Depth (cm) 0.1 cm 05/05/25 1430   Wound Surface Area (cm^2) 2.51 cm^2 05/05/25 1430   Wound Volume (cm^3) 0.168 cm^3 05/05/25 1430   Drainage Amount none 05/05/25 1430   Care, Wound cleansed with;sterile normal saline 05/05/25 1430   Dressing Care dressing applied;silver impregnated;hydrofiber;abdominal pad;gauze, dry;tubular wrap 05/05/25 1430   Periwound Care absorptive dressing applied 05/05/25 1430       Wound 02/03/25 1131 Right anterior leg (Active)   Wound Image   05/05/25 1430   Dressing Appearance dry;intact 05/05/25 1430   Closure None 05/05/25 1430   Base moist;red 05/05/25 1430   Red (%), Wound Tissue Color 100 05/05/25 1430   Periwound dry;maroon/purple 05/05/25 1430   Periwound Temperature warm 05/05/25 1430   Periwound Skin Turgor soft 05/05/25 1430   Edges open 05/05/25 1430   Wound Length (cm) 1.8 cm 05/05/25 1430   Wound Width (cm) 1.8 cm 05/05/25 1430   Wound Depth (cm) 0.2 cm 05/05/25 1430   Wound Surface Area (cm^2) 2.54 cm^2 05/05/25 1430   Wound Volume (cm^3) 0.339 cm^3 05/05/25 1430   Drainage Characteristics/Odor serosanguineous 05/05/25 1430   Drainage Amount scant 05/05/25 1430   Care, Wound cleansed with;sterile normal saline 05/05/25 1430   Dressing Care dressing removed;dressing applied;silver impregnated;hydrofiber;abdominal pad;gauze, dry;tubular wrap 05/05/25 1430   Periwound Care absorptive dressing applied 05/05/25 1430       Wound 02/03/25 1136 Left anterior leg (Active)   Wound Image   05/05/25 1430   Dressing Appearance dry;intact 05/05/25 1430   Closure None 05/05/25 1430   Base moist;yellow;red  05/05/25 1430   Periwound dry;maroon/purple;redness 05/05/25 1430   Periwound Temperature warm 05/05/25 1430   Periwound Skin Turgor soft 05/05/25 1430   Edges open 05/05/25 1430   Wound Length (cm) 0.8 cm 05/05/25 1430   Wound Width (cm) 0.4 cm 05/05/25 1430   Wound Depth (cm) 0.1 cm 05/05/25 1430   Wound Surface Area (cm^2) 0.25 cm^2 05/05/25 1430   Wound Volume (cm^3) 0.017 cm^3 05/05/25 1430   Drainage Characteristics/Odor serosanguineous 05/05/25 1430   Drainage Amount scant 05/05/25 1430   Care, Wound cleansed with;sterile normal saline 05/05/25 1430   Dressing Care dressing removed;dressing applied;silver impregnated;hydrofiber;abdominal pad;gauze, dry;tubular wrap 05/05/25 1430   Periwound Care absorptive dressing applied 05/05/25 1430       Wound 05/04/25 1206 Right distal calf (Active)   Wound Image    05/05/25 1430   Dressing Appearance dry;intact 05/05/25 1430   Closure None 05/05/25 1430   Base moist;red;yellow 05/05/25 1430   Periwound dry;redness;maroon/purple 05/05/25 1430   Periwound Temperature warm 05/05/25 1430   Periwound Skin Turgor soft 05/05/25 1430   Edges open 05/05/25 1430   Wound Length (cm) 3.4 cm 05/05/25 1430   Wound Width (cm) 2.2 cm 05/05/25 1430   Wound Depth (cm) 0.2 cm 05/05/25 1430   Wound Surface Area (cm^2) 5.87 cm^2 05/05/25 1430   Wound Volume (cm^3) 0.783 cm^3 05/05/25 1430   Drainage Characteristics/Odor serosanguineous 05/05/25 1430   Drainage Amount scant 05/05/25 1430   Care, Wound cleansed with;sterile normal saline 05/05/25 1430   Dressing Care dressing removed;dressing applied;silver impregnated;hydrofiber;abdominal pad;gauze, dry;tubular wrap 05/05/25 1430   Periwound Care absorptive dressing applied 05/05/25 1430       Wound 05/04/25 1255 Left cheek (Active)   Dressing Appearance open to air 05/05/25 1430   Closure None 05/05/25 1430   Base dry;red 05/05/25 1430   Periwound dry;pink 05/05/25 1430   Dressing Care open to air 05/05/25 1430      Abbreviated  "Note    Wound Check / Follow-up:  Patient seen today for wound consult. Patient is drowsy but awakens to voice. Patient declines to answer orientation questions, stating, \"I am where I was the last time someone asked.\" Patient reports she is discharging home today. Primary RN confirms plan to discharge home. Primary RN reports  has sent referrals for patient to receive home health per family request. Patient is NPO with a G-Tube for enteral nutrition. Patient states dry red crusting to left cheek was from a previous surgery. Patient declines to have site cleaned or assessed. Patient states wounds to her lower legs have been present for approximately one year. Patient's family is present at bedside during assessment.    Please see wound assessments above:    Ulcerations noted to right calf, right lateral lower leg, right anterior lower leg, left medial lower leg, and left calf. Hyperpigmentation with red to maroon and purple coloration to bilateral lower legs. Left calf wound base is noted to be dry and open to air at time of assessment. Moist yellow tissue noted to left medial, right lateral, and right calf wound bases. Cleansed all sites with normal saline and gauze, blotted dry. Suggesting every other day dressing changes with silver impregnated hydrofiber moistened with one to five drops of normal saline, ABD pad covering, and gauze roll securement. Recommending quality skin care and hygiene with application of purple top moisturizer to intact skin on lower extremities prior to wound care.    Patient refused any additional skin assessment. Recommending quality skin care and hygiene with application of blue top moisture barrier four times a day, and as needed for incontinence.  Implement every 2 hour turns, and offload at all times.  Keep patient clean, dry, and free from all moisture.     Impression: Chronic ulcerations to bilateral lower legs with hyperpigmentation to periwound.    Short term goals: "  Regain skin integrity, skin protection, moisture prevention, pressure reduction, quality skin care and hygiene, every other day dressing changes.    Jessenia Camarillo RN    5/5/2025    17:19 EDT

## 2025-05-05 NOTE — PROGRESS NOTES
RT EQUIPMENT DEVICE RELATED - SKIN ASSESSMENT        Skin Assessment:      Pt refused bipap. Currently on 2L NC. Skin intact around mouth and chin.  Liz Juan, RRT

## 2025-05-05 NOTE — OUTREACH NOTE
Prep Survey      Flowsheet Row Responses   [facility]? [facility]   Is LACE score < 7 ? No   Eligibility Readm Mgmt   Discharge diagnosis COPD exacerbation   Does the patient have one of the following disease processes/diagnoses(primary or secondary)? COPD   Does the patient have Home health ordered? Yes   What is the Home health agency?  [facility]   Prep survey completed? Yes            Nehal ROBERTSON - Registered Nurse

## 2025-05-05 NOTE — DISCHARGE SUMMARY
[facility]         HOSPITALIST  DISCHARGE SUMMARY    Patient Name: Isha Llanes  : 1965  MRN: [id number]    Date of Admission: 2025  Date of Discharge:  25  Primary Care Physician: Virgil Salas MD    Hospital Problems:  Acute on chronic hypercapnic respiratory  Laryngeal cancer  1 cm right lower lobe nodule pending result  Type 2 diabetes mellitus  Hypertension  COPD, not acutely exacerbated  ABY  History of lupus  ER stenosis s/p TAVR    Hospital Course     Hospital Course:  Isha Llanes is a 59 y.o. female with type 2 diabetes mellitus, history of laryngeal cancer s/p pain pump, chronic narcotic use, chronic severe anxiety on Xanax, Hodgkin's lymphoma, SCC, COPD, lupus, ABY that presented to the ED due to lethargy and confusion.  Patient noncompliant with home BiPAP therapy.  In the ED patient was placed on BiPAP mask and there was subsequent improvement in patient's mental status.  Infectious workup nonrevealing.  Patient continued on home medications and return to her baseline level of function.  Patient was very adamant about being discharged from the hospital.  She stated that she did not wish to be in the hospital any longer.  Given patient's return to her baseline level of function patient will discharge home with outpatient follow-up.  Due to patient's severe medical comorbidities and history of noncompliance that she is very high risk for readmission to the hospital.    DISCHARGE Follow Up Recommendations for labs and diagnostics:   - Follow-up with PCP in 3 to 5 days  -Follow-up in COPD clinic in 1-2 weeks    Day of Discharge     Vital Signs:  Temp:  [98.1 °F (36.7 °C)-99.1 °F (37.3 °C)] 98.2 °F (36.8 °C)  Heart Rate:  [] 99  Resp:  [16-20] 18  BP: ()/(30-84) 106/72  Flow (L/min) (Oxygen Therapy):  [2-3] 2  Physical Exam:   Gen: Conversant, sitting up in bed  Resp: Normal respiratory effort  Card: RRR, No m/r/g  Abd: Soft, Nontender,  Nondistended, + bowel sounds    Discharge Details        Discharge Medications        Changes to Medications        Instructions Start Date   predniSONE 5 MG tablet  Commonly known as: DELTASONE  What changed:   medication strength  how much to take  additional instructions   15 mg, Oral, 2 Times Daily, 15mg in am 15mg in pm             Continue These Medications        Instructions Start Date   ALPRAZolam 0.25 MG tablet  Commonly known as: XANAX   0.25 mg, 3 times daily      arformoterol 15 MCG/2ML nebulizer solution  Commonly known as: Brovana   15 mcg, Nebulization, 2 Times Daily - RT      budesonide 0.5 MG/2ML nebulizer solution  Commonly known as: Pulmicort   0.5 mg, Nebulization, 2 Times Daily - RT, Rinse mouth out after each use      famotidine 40 MG tablet  Commonly known as: Pepcid   40 mg, Oral, Every Night at Bedtime      furosemide 20 MG tablet  Commonly known as: LASIX   20 mg, Daily      Insulin Lispro (1 Unit Dial) 100 UNIT/ML solution pen-injector  Commonly known as: HUMALOG   Inject 14 Units under the skin into the appropriate area as directed Daily.      ipratropium-albuterol 0.5-2.5 mg/3 ml nebulizer  Commonly known as: DUO-NEB   3 mL, Nebulization, 4 Times Daily PRN      loperamide 2 MG capsule  Commonly known as: IMODIUM   2 mg, Oral, 4 Times Daily PRN      metoprolol succinate XL 25 MG 24 hr tablet  Commonly known as: TOPROL-XL   25 mg, Oral, Every Night at Bedtime      mupirocin 2 % ointment  Commonly known as: BACTROBAN   1 Application, Topical, 3 Times Daily      ondansetron ODT 8 MG disintegrating tablet  Commonly known as: ZOFRAN-ODT   Place 1 tablet on the tongue Daily As Needed.      pain patient supplied pump   Intrathecal, Continuous, Type of Medication: Hydromorphone 15 mg/ml and Bupivacaine 0.5 mg/ml Pentech [phone] Provider:Dr. Boudreaux Provider number: [phone] Basal Dose: Hydromorphone 7.518 mg/day Bupivacaine 0.83997 mg/day Pump capacity: 40ml ( MAX of Hydromorphone 9.456  mg/ day; Bupivacaine 0.95725 mg /day) Bolus Dose:Hydromorphone 0.500 mg, Bupivacaine 0.72960 mg Max activations: 4 per day Lockout 3 hours. 1 Bolus per every 3 hours  Last refill-  4/24/2025 Alarm date- 6/23/2025 Pump manufacture:ZenoLink      PARoxetine 20 MG tablet  Commonly known as: PAXIL   Take 1 tablet by mouth Every Morning.      Systane 0.4-0.3 % solution ophthalmic solution (artificial tears)  Generic drug: polyethyl glycol-propyl glycol   Administer 1-2 drops to both eyes 4 (Four) Times a Day As Needed.      Tylenol 325 MG tablet  Generic drug: acetaminophen   325 mg, Every 6 Hours PRN               Allergies   Allergen Reactions    Amoxicillin Shortness Of Breath     Has tolerated Cefazolin, Ceftriaxone, and Cefepime -Jermaine Melida, RPH    Ceclor [Cefaclor] Shortness Of Breath     Has tolerated Cefazolin, Ceftriaxone, and Cefepime -Jermaine Melida, RPH      Penicillins Shortness Of Breath     Has tolerated Cefazolin, Ceftriaxone, and Cefepime -Jermaine Melida, RPH    Sulfa Antibiotics Rash    Valium [Diazepam] Mental Status Change     DEPRESSED    Ambien [Zolpidem] Mental Status Change    Aspirin GI Intolerance    Ativan [Lorazepam] Mental Status Change    Benadryl [Diphenhydramine] Anxiety     AND MAKES HER HYPER    Biaxin [Clarithromycin] Unknown - Low Severity    Cephalexin Unknown - Low Severity    Clindamycin Unknown - Low Severity    Compazine [Prochlorperazine Edisylate] Rash    Contrast Dye (Echo Or Unknown Ct/Mr) Other (See Comments)     Caused pain in her arm    Doxycycline Rash    Nsaids GI Intolerance    Phenergan [Promethazine Hcl] GI Intolerance    Promethazine GI Intolerance    Vancomycin Itching       Discharge Disposition:  Home-Health Care Svc    Diet:  Hospital:  Diet Order   Procedures    NPO Diet NPO Type: Ice Chips, Other (see comments)       Discharge Activity:   Activity Instructions       Activity as Tolerated              CODE STATUS:  Code Status and Medical Interventions:  CPR (Attempt to Resuscitate); Full Support   Ordered at: 05/04/25 1304     Code Status (Patient has no pulse and is not breathing):    CPR (Attempt to Resuscitate)     Medical Interventions (Patient has pulse or is breathing):    Full Support       Future Appointments   Date Time Provider Department Center   5/16/2025 11:00 AM Goyo Nunez MD [facility] RO RAKEL [facility]   5/22/2025 10:00 AM Real Hernandez MD N[facility] SLEEP CT [facility]   7/22/2025  8:00 AM Kelin Rushing APRN [facility] PCC ETW RAKEL       Additional Instructions for the Follow-ups that You Need to Schedule       Discharge Follow-up with PCP   As directed       Currently Documented PCP:    Virgil Salas MD    PCP Phone Number:    [phone]     Follow Up Details: Follow-up in 3 to 5 days                Pertinent  and/or Most Recent Results     RADIOLOGY:  CT Head Without Contrast [[id number]] Akil as Reviewed   Order Status: Completed Collected: 05/04/25 0943    Updated: 05/04/25 0948   Narrative:     CT HEAD WO CONTRAST    Date of Exam: 5/4/2025 9:13 AM EDT    Indication: ams.    Comparison: 8/21/2024    Technique: Axial CT images were obtained of the head without contrast administration.  Reconstructed coronal and sagittal images were also obtained. Automated exposure control and iterative construction methods were used.      Findings:  No large territory infarct.    There is no evidence of hemorrhage.  No mass effect, edema or midline shift    Unremarkable white matter    No extra-axial fluid collection.    The ventricles are normal in size and configuration.  Partially empty sella. Otherwise the midline structures are unremarkable.    The visualized orbits are unremarkable.  The visualized paranasal sinuses and mastoid air cells are clear.    The visualized soft tissues are unremarkable.  No acute osseous abnormality.   Impression:     Impression:  No acute intracranial abnormality.        Electronically Signed: Herrera Howe DO   5/4/2025 9:46 AM EDT    Workstation ID: [id number]    XR Chest 1 View [[id number]] Akil as Reviewed   Order Status: Completed Collected: 05/04/25 1011    Updated: 05/04/25 1016   Narrative:     XR CHEST 1 VW    Date of Exam: 5/4/2025 9:10 AM EDT    Indication: SOA Triage Protocol    Comparison: Chest x-ray dated 4/22/2025    Findings:  Left chest wall Port-A-Cath terminates overlying the SVC. Status post TAVR. Vague right upper lobe opacity is favored artifactual due to overlying structures. Right upper lobe infiltrate cannot be entirely excluded. Left lung appears adequately aerated.  No significant pleural effusion or pneumothorax. The cardiomediastinal silhouette and pulmonary vasculature appear within normal limits. No acute or suspicious osseous lesion is identified. Surgical clips project over the left upper thorax/axilla.  Radiodense structure projects over the right upper abdomen.   Impression:     Impression:  1.Vague right upper lobe opacity is favored artifactual due to overlapping structures. Right upper lobe infiltrate cannot be excluded.  2.Otherwise, no acute radiographic abnormality is identified.      Electronically Signed: Jevon Baez MD   5/4/2025 10:14 AM EDT   Workstation ID: [id number]   XR Chest 1 View [[id number]] Reviewed: 04/22/25 1150   Order Status: Completed Collected: 04/22/25 1117    Updated: 04/22/25 1120   Narrative:     XR CHEST 1 VW    Date of Exam: 4/22/2025 11:05 AM EDT    Indication: lethargic post opbronch    Comparison: 3/28/2025    Findings:  Cardiac size is stable. The pulmonary vascular markings are normal. There is a left-sided chest port in appropriate position. The patient has developed patchy airspace disease dependently in both lung bases. The upper lobes are clear. There is no  pneumothorax identified.   Impression:     Impression:  Interval development of patchy airspace disease dependently in both lung bases.        Electronically Signed: Jovany Franco MD   4/22/2025 11:18 AM EDT    Workstation ID: [id number]     LAB RESULTS:      Lab 05/05/25  0810 05/04/25  1252 05/04/25  0849 05/04/25  0818   WBC 7.45  --   --  12.37*   HEMOGLOBIN 13.9  --   --  13.9   HEMATOCRIT 44.7  --   --  46.6   PLATELETS 144  --   --  150   NEUTROS ABS  --   --   --  10.93*   IMMATURE GRANS (ABS)  --   --   --  0.11*   LYMPHS ABS  --   --   --  0.82   MONOS ABS  --   --   --  0.48   EOS ABS  --   --   --  0.01   MCV 86.5  --   --  87.9   LACTATE  --  1.4  1.4 1.1 2.0         Lab 05/05/25  0810 05/04/25  0818   SODIUM 136 133*   POTASSIUM 3.8 3.9   CHLORIDE 96* 94*   CO2 25.3 28.4   ANION GAP 14.7 10.6   BUN 11 15   CREATININE 0.39* 0.54*   EGFR 114.9 106.2   GLUCOSE 164* 382*   CALCIUM 8.9 9.2         Lab 05/04/25  0818   TOTAL PROTEIN 7.0   ALBUMIN 3.9   GLOBULIN 3.1   ALT (SGPT) 15   AST (SGOT) 15   BILIRUBIN 0.2   ALK PHOS 118*         Lab 05/04/25  0920 05/04/25  0818   PROBNP  --  909.5*   HSTROP T 42* 36*                 Lab 05/04/25  1252 05/04/25  0849   PH, ARTERIAL  --  7.407   PCO2, ARTERIAL  --  51.7*   PO2 ART  --  78.4*   O2 SATURATION ART  --  95.3   FIO2 28 28   HCO3 ART  --  32.5*   BASE EXCESS ART  --  6.3*     Brief Urine Lab Results  (Last result in the past 365 days)        Color   Clarity   Blood   Leuk Est   Nitrite   Protein   CREAT   Urine HCG        05/04/25 0838 Yellow   Clear   Negative   Negative   Negative   Trace                 Microbiology Results (last 10 days)       Procedure Component Value - Date/Time    Blood Culture - Blood, Arm, Right [[id number]]  (Normal) Collected: 05/04/25 0857    Lab Status: Preliminary result Specimen: Blood from Arm, Right Updated: 05/05/25 0915     Blood Culture No growth at 24 hours    Blood Culture - Blood, Chest, Right [[id number]]  (Normal) Collected: 05/04/25 0851    Lab Status: Preliminary result Specimen: Blood from Chest, Right Updated: 05/05/25 0915     Blood Culture No growth at 24 hours    Wound Culture - Swab, Leg, Right [[id number]]  (Abnormal)  Collected: 05/04/25 0842    Lab Status: Preliminary result Specimen: Swab from Leg, Right Updated: 05/05/25 1037     Wound Culture Moderate growth (3+) Gram Negative Bacilli      Moderate growth (3+) Normal Skin Vanna     Gram Stain Few (2+) Gram positive cocci in pairs, chains and clusters      Rare (1+) Gram negative bacilli    COVID-19, FLU A/B, RSV PCR 1 HR TAT - Swab, Nasopharynx [[id number]]  (Normal) Collected: 05/04/25 0818    Lab Status: Final result Specimen: Swab from Nasopharynx Updated: 05/04/25 0918     COVID19 Not Detected     Influenza A PCR Not Detected     Influenza B PCR Not Detected     RSV, PCR Not Detected    Narrative:      Fact sheet for providers: https://www.fda.gov/media/958290/download    Fact sheet for patients: https://www.fda.gov/media/824968/download    Test performed by PCR.              Results for orders placed during the hospital encounter of 02/04/25    Doppler Arterial Single Level Upper Extremity - Bilateral CAR    Interpretation Summary    Normal arterial pressures on the right. Normal digital pressures noted on the right.    Normal arterial pressures on the left. Normal digital pressures noted on the left.    Normal arterial evaluation of both upper extremities.      Results for orders placed during the hospital encounter of 02/04/25    Doppler Arterial Single Level Upper Extremity - Bilateral CAR    Interpretation Summary    Normal arterial pressures on the right. Normal digital pressures noted on the right.    Normal arterial pressures on the left. Normal digital pressures noted on the left.    Normal arterial evaluation of both upper extremities.      Results for orders placed during the hospital encounter of 03/28/25    Adult Transthoracic Echo Limited W/ Cont if Necessary Per Protocol    Interpretation Summary    Left ventricular ejection fraction appears to be 61 - 65%.    Left ventricular wall thickness is consistent with mild to moderate septal asymmetric  hypertrophy.    Left ventricular diastolic function is consistent with (grade I) impaired relaxation.    The left atrial cavity is mildly dilated.    There is a prosthetic aortic valve of unknown type present.    Estimated right ventricular systolic pressure from tricuspid regurgitation is normal (<35 mmHg).    There is a trivial pericardial effusion.      Labs Pending at Discharge:  Pending Labs       Order Current Status    Blood Culture - Blood, Arm, Right Preliminary result    Blood Culture - Blood, Chest, Right Preliminary result    Wound Culture - Swab, Leg, Right Preliminary result            Time spent on Discharge including face to face service:  31 minutes    Electronically signed by Virgil Navarro MD, 05/05/25, 11:39 AM EDT.

## 2025-05-05 NOTE — CASE MANAGEMENT/SOCIAL WORK
Discharge Planning Assessment  JEMMA Go     Patient Name: Isha Llanes  MRN: [id number]  Today's Date: 5/5/2025    Admit Date: 5/4/2025    Plan: Pt plans to discharge home with daughter. Daughter wants [facility]. SW has made referral to [facility]. PCP: ABEBE Salas. Daughter will transport Pt home.SW will continue to follow for needs.   Discharge Needs Assessment       Row Name 05/05/25 1113       Living Environment    People in Home child(felix), adult    Current Living Arrangements home    Potentially Unsafe Housing Conditions none    In the past 12 months has the electric, gas, oil, or water company threatened to shut off services in your home? No    Primary Care Provided by self    Provides Primary Care For no one    Family Caregiver if Needed child(felix), adult    Quality of Family Relationships helpful;involved    Able to Return to Prior Arrangements yes       Resource/Environmental Concerns    Resource/Environmental Concerns financial;reliable transportation    Transportation Concerns rides, unreliable from others       Transportation Needs    In the past 12 months, has lack of transportation kept you from meetings, work, or from getting things needed for daily living? No       Food Insecurity    Within the past 12 months, you worried that your food would run out before you got the money to buy more. Never true    Within the past 12 months, the food you bought just didn't last and you didn't have money to get more. Never true       Transition Planning    Patient/Family Anticipates Transition to home;home with family    Patient/Family Anticipated Services at Transition none    Transportation Anticipated family or friend will provide       Discharge Needs Assessment    Equipment Currently Used at Home nebulizer;oxygen;wheelchair;feeding device;cpap;nutrition supplies    Concerns to be Addressed discharge planning    Do you want help finding or keeping work or a job? I do not need or want help    Do you want help  with school or training? For example, starting or completing job training or getting a high school diploma, GED or equivalent No    Anticipated Changes Related to Illness none    Equipment Needed After Discharge none    Discharge Coordination/Progress Pt plans to discharge home with daughter. Daughter wants [facility]. SW has made referral to [facility].  PCP: ABEBE Salas. Daughter will transport Pt home.SW will continue to follow for needs.                   Discharge Plan       Row Name 05/05/25 1117       Plan    Plan Pt plans to discharge home with daughter. Daughter wants [facility]. SW has made referral to [facility]. PCP: ABEBE Salas. Daughter will transport Pt home.SW will continue to follow for needs.      Row Name 05/05/25 1106       Plan    Plan Pt will discharge home today. Daughter will transport Pt home this afternoon, daughter to arrive around 4 pm.    Final Discharge Disposition Code 01 - home or self-care                  Continued Care and Services - Admitted Since 5/4/2025       Home Medical Care       Service Provider Request Status Services Address Phone Fax Patient Preferred    [facility] [place] Pending - Request Sent -- [address] [place] [address] [phone] --                     Demographic Summary       Row Name 05/05/25 1107       General Information    Admission Type inpatient    Arrived From emergency department    Referral Source admission list    Reason for Consult discharge planning    Preferred Language English       Contact Information    Permission Granted to Share Info With permission denied                   Functional Status       Row Name 05/05/25 1113       Functional Status    Usual Activity Tolerance moderate    Current Activity Tolerance moderate       Physical Activity    On average, how many days per week do you engage in moderate to strenuous exercise (like a brisk walk)? 0 days    On average, how many minutes do you engage in exercise at  this level? 0 min    Number of minutes of exercise per week 0       Assessment of Health Literacy    How often do you have someone help you read hospital materials? Never    How often do you have problems learning about your medical condition because of difficulty understanding written information? Never    How often do you have a problem understanding what is told to you about your medical condition? Never    How confident are you filling out medical forms by yourself? Quite a bit    Health Literacy Good       Functional Status, IADL    Medications independent    Meal Preparation independent    Housekeeping independent    Laundry independent    Shopping independent    If for any reason you need help with day-to-day activities such as bathing, preparing meals, shopping, managing finances, etc., do you get the help you need? I don't need any help       Mental Status    General Appearance WDL WDL       Mental Status Summary    Recent Changes in Mental Status/Cognitive Functioning no changes       Employment/    Employment Status disabled                   Psychosocial    No documentation.                  Abuse/Neglect    No documentation.                  Legal    No documentation.                  Substance Abuse    No documentation.                  Patient Forms    No documentation.                     Diamond Shankar

## 2025-05-05 NOTE — PLAN OF CARE
Goal Outcome Evaluation:  Plan of Care Reviewed With: patient        Progress: no change  Outcome Evaluation: pt confused to time and situation, refusing care at times, wore BIPAP for about 2 hours, refusing NC, O2 sat 93% on RA, drops to 84% with movement, pt educated and encouraged to wear O2 and BIPAP, pt diet advanced to full liquids nectar thick but pt very drowsy, unable to stay awake to attempt PO, some improvment after BIPAP, up to BSC x1 assist, pt resting no requests at this time

## 2025-05-05 NOTE — PLAN OF CARE
Goal Outcome Evaluation:  Plan of Care Reviewed With: patient        Progress: no change  Outcome Evaluation: Patient discharging home with Daughter on shift

## 2025-05-05 NOTE — PLAN OF CARE
Goal Outcome Evaluation:  Plan of Care Reviewed With: patient        Progress: no change  Outcome Evaluation: Patient refused bipap. Unit is on standby in the room. Currently on 2L.

## 2025-05-05 NOTE — PROGRESS NOTES
Patient currently on V60 unit tolerating well. Patient wears 2L nc whenever not on V60 unit. Patient to wear V60 qhs/prn. No issues or changes at this time.

## 2025-05-05 NOTE — THERAPY EVALUATION
RT EQUIPMENT DEVICE RELATED - SKIN ASSESSMENT    RT Medical Equipment/Device:     NIV Mask:  Under-the-nose   size:  B    Skin Assessment:      LIPS, NOSE, CHEEKS:  Intact    Device Skin Pressure Protection:  Positioning supports utilized    Nurse Notification:  Zayda Ortiz, RRT

## 2025-05-05 NOTE — THERAPY EVALUATION
Acute Care - Speech Language Pathology   Swallow Initial Evaluation JEMMA Go     Patient Name: Isha Llanes  : 1965  MRN: [id number]  Today's Date: 2025               Admit Date: 2025    Visit Dx:     ICD-10-CM ICD-9-CM   1. COPD exacerbation  J44.1 491.21   2. Oropharyngeal dysphagia  R13.12 787.22     Patient Active Problem List   Diagnosis    Septic shock    Acute MI    Cancer associated pain    Presence of intrathecal pump    Chronic low back pain with bilateral sciatica    Sacroiliac inflammation    Chronic pain syndrome    Pelvic pain    Aortic stenosis, severe    Dyspnea on effort    Other pulmonary embolism without acute cor pulmonale    Nonrheumatic aortic valve stenosis    Hip pain    Anxiety disorder    Allergic rhinitis due to allergen    Arthritis    Asthma    Kidney disease    CHF (congestive heart failure)    Chronic obstructive pulmonary disease    Diabetes mellitus, type 2    Depression    GERD (gastroesophageal reflux disease)    S/P TAVR (transcatheter aortic valve replacement)    Limited mobility    Venous hypertension of both lower extremities    Nicotine dependence    MVP (mitral valve prolapse)    Lupus (systemic lupus erythematosus)    Long term current use of anticoagulant therapy    History of Hodgkin's lymphoma    Hepatic steatosis    Heart murmur    Cellulitis of lower extremity    CAD (coronary artery disease)    Non-healing wound of lower extremity, subsequent encounter    MSSA (methicillin susceptible Staphylococcus aureus) infection    Sepsis    Change in bowel habits    Nausea    Venous stasis ulcer of right calf    Venous stasis ulcer of left calf    Noncompliance    Peripheral vascular disease    Obesity with body mass index 30 or greater    Neurologic disorder    Neck pain    Limb pain    Hiatal hernia    Bladder disorder    Frailty    Cellulitis of right foot    Bowel disease    Atrophy of skin    Breast lump    Encounter for care related to vascular  access port    Primary osteoarthritis of right hip    Tobacco abuse    Squamous cell carcinoma of larynx    Throat pain    Voice hoarseness    Hodgkin lymphoma    Malignant neoplasm of supraglottis    Dysphagia    Nausea vomiting and diarrhea    Weight loss, abnormal    Epigastric pain    Nausea and vomiting    History of laryngeal cancer    Anorexia    AMS (altered mental status)    Acute on chronic respiratory failure with hypoxia and hypercapnia    UTI (urinary tract infection)    Anterior neck pain    COVID-19 virus infection    Hypoxemia    Generalized weakness    Chronic respiratory failure with hypoxia and hypercapnia    Acute on chronic respiratory failure with hypercapnia    Intractable nausea and vomiting    Abnormal chest CT    Lung nodule    Hypercapnia    COPD exacerbation     Past Medical History:   Diagnosis Date    Anesthesia     REPORTS HAS GOT REAL EMOTIONAL AND HAS BECOME ANGRY BEFORE    Anxiety     Aortic stenosis, severe     HAS BIO VALVE REPLACED    Arthritis     Asthma     Back pain     Blood clotting disorder     test to find out what type    Cancer     CHF (congestive heart failure)     Chronic low back pain with sciatica     Chronic pain disorder     COPD (chronic obstructive pulmonary disease)     Coronary artery disease     looking to follow with Cardio    Diabetes mellitus     TYPE 2    Dyspnea on effort     Fatty liver     GERD (gastroesophageal reflux disease)     H/O lumpectomy     H/O: hysterectomy     Hiatal hernia     History of degenerative disc disease     History of kidney stones     History of pulmonary embolus (PE)     History of transfusion     AS A CHILD NO TRANSFUSION REACTION    Hodgkin's lymphoma     5/19/23  5 YEARS SINCE LAST CHEMO TX    Hypertension     Immobility     Lupus     Nausea vomiting and diarrhea 03/07/2024    Panic disorder     Pelvic pain     Peripheral neuropathy     Personal history of DVT (deep vein thrombosis)     over 20 years    PONV (postoperative  nausea and vomiting)     Presence of intrathecal pump     PTSD (post-traumatic stress disorder)     Sacroiliac joint disease     SI (sacroiliac) joint inflammation     Venous insufficiency      Past Surgical History:   Procedure Laterality Date    ABDOMINAL SURGERY      BACK SURGERY      SPINAL FUSION     BACK SURGERY      BLADDER REPAIR      BRONCHOSCOPY WITH ION ROBOTIC ASSIST N/A 4/22/2025    Procedure: BRONCHOSCOPY WITH ION ROBOT: REBUS, EBUS, NEEDLE ASPIRATES, BIOPSIES, BRUSHINGS, BAL, WASHINGS: insertion of lighted robot navigated instrument to view inside the lung;  Surgeon: Enzo Segal MD;  Location: [facility] MAIN OR;  Service: Robotics - Pulmonary;  Laterality: N/A;    CARDIAC CATHETERIZATION N/A 03/06/2019    Procedure: Valvuloplasty;  Surgeon: Wayne Johnson MD;  Location: [facility] INVASIVE LOCATION;  Service: Cardiology    CARDIAC CATHETERIZATION      CARDIAC CATHETERIZATION Left 05/31/2023    Procedure: Cardiac Catheterization/Vascular Study;  Surgeon: Poncho Reid MD;  Location: [facility] CATH INVASIVE LOCATION;  Service: Cardiovascular;  Laterality: Left;    CARDIAC SURGERY      Mechanical valve    CARDIAC VALVE REPLACEMENT  2021    CHOLECYSTECTOMY      COLONOSCOPY N/A 12/29/2021    Procedure: COLONOSCOPY;  Surgeon: Karine Orlando MD;  Location: [facility] ENDOSCOPY;  Service: Gastroenterology;  Laterality: N/A;  POOR PREP    COLONOSCOPY N/A 04/13/2022    Procedure: COLONOSCOPY WITH POLYPECTOMY;  Surgeon: Karine Orlando MD;  Location: [facility] ENDOSCOPY;  Service: Gastroenterology;  Laterality: N/A;  COLON POLYP, HEMORRHOIDS, POOR PREP    COLONOSCOPY      DIRECT LARYNGOSCOPY, ESOPHAGOSCOPY, BRONCHOSCOPY N/A 05/22/2023    Procedure: DIRECT LARYNGOSCOPY WITH BIOPSIES , ESOPHAGOSCOPY, BRONCHOSCOPY;  Surgeon: Ronnie Mcgarry MD;  Location: [facility] OR OSC;  Service: ENT;  Laterality: N/A;    ENDOSCOPY N/A 12/29/2021    Procedure: ESOPHAGOGASTRODUODENOSCOPY;  Surgeon:  Karine Orlando MD;  Location: [facility] ENDOSCOPY;  Service: Gastroenterology;  Laterality: N/A;  ESOPHAGITIS, GASTRITIS, HIATAL HERNIA    ENDOSCOPY      ENDOSCOPY N/A 04/16/2024    Procedure: ESOPHAGOGASTRODUODENOSCOPY WITH BIOPSIES;  Surgeon: Karine Orlando MD;  Location: [facility] ENDOSCOPY;  Service: Gastroenterology;  Laterality: N/A;  ESOPHAGITIS, HIATAL HERNIA    ENDOSCOPY W/ PEG TUBE PLACEMENT N/A 12/10/2024    Procedure: ESOPHAGOGASTRODUODENOSCOPY WITH PERCUTANEOUS ENDOSCOPIC GASTROSTOMY TUBE INSERTION WITH ANESTHESIA;  Surgeon: Rodger Ortega MD;  Location: [facility] ENDOSCOPY;  Service: Gastroenterology;  Laterality: N/A;  PEG TUBE INSERTION    HYSTERECTOMY      LYMPHADENECTOMY      PAIN PUMP INSERTION/REVISION N/A 10/18/2019    Procedure: PAIN PUMP INSERTION [facility] 10-18-19 [place];  Surgeon: Horace Rios MD;  Location: [facility];  Service: Pain Management    PAIN PUMP INSERTION/REVISION N/A 11/23/2020    Procedure: pain pump removal;  Surgeon: Horace Rios MD;  Location: [facility];  Service: Pain Management;  Laterality: N/A;    PEG TUBE INSERTION N/A 07/26/2023    Procedure: PERCUTANEOUS ENDOSCOPIC GASTROSTOMY TUBE INSERTION;  Surgeon: Kody Mercer MD;  Location: [facility] ENDOSCOPY;  Service: General;  Laterality: N/A;  SUCCESSFUL PEG TUBE PLACE PLACEMENT    PORTACATH PLACEMENT      IN LEFT CHEST REPORTS THAT IT IS NOT FUNCTIONING    SKIN BIOPSY      TONSILLECTOMY      TUBAL ABDOMINAL LIGATION      TUMOR REMOVAL      vaginal /anal area           Inpatient Speech Pathology Dysphagia Evaluation        PAIN SCALE: None indicated.    PRECAUTIONS/CONTRAINDICATIONS: Standard    SUSPECTED ABUSE/NEGLECT/EXPLOITATION: None indicated.    SOCIAL/PSYCHOLOGICAL NEEDS/BARRIERS: None indicated.    PAST SOCIAL HISTORY: 59-year-old female lives at home    PRIOR FUNCTION: PEG as primary nutrition, has been recommended for n.p.o.    PATIENT GOALS/EXPECTATIONS: Patient wanting  water    HISTORY: 59-year-old female the above diagnosis referred for speech therapy evaluation to assess for swallowing.  Patient asking for something to drink.  Last modified barium swallow study was 12/10/2024 which demonstrated silent aspiration with all consistencies trialed.    CURRENT DIET LEVEL: N.p.o., PEG    OBJECTIVE:    TEST ADMINISTERED: Clinical dysphagia evaluation    COGNITION/SAFETY AWARENESS: Impaired    BEHAVIORAL OBSERVATIONS: Alert and cooperative    ORAL MOTOR EXAM: Grossly within limits, patient is edentulous    VOICE QUALITY: Aphonic    REFLEX EXAM: Patient demonstrating cough    POSTURE: Sitting upright in bed    FEEDING/SWALLOWING FUNCTION: Assessed with honey thickened liquid, thin liquids.    CLINICAL OBSERVATIONS: Patient given ice chips x 3 with cough noted x 2.  Patient taking sips of thin water with frequent delayed cough, minimal wet vocal quality.  Trials of honey thickened liquid by spoon with increased wheeze noted.    DYSPHAGIA CRITERIA: Swallow delay, history of silent aspiration, dysphagia, high risk of aspiration.    FUNCTIONAL ASSESSMENT INSTRUMENT: Patient currently scored a level 1 of 7 on Functional Communication Measures for swallowing indicating a 100% limitation in function.    ASSESSMENT/ PLAN OF CARE: No direct speech therapy is recommended at this time for dysphagia.  Patient declining speech pathology services.      RECOMMENDATIONS:   1.   DIET: Cannot recommend safe p.o. diet.  Recommend PEG as primary nutrition.  May have single ice chips with nursing.      Pt/responsible party agrees with plan of care and has been informed of all alternatives, risks and benefits.                              Anticipated Discharge Disposition (SLP): home with assist (05/05/25 0177)                                                               EDUCATION  The patient has been educated in the following areas:   NPO rationale.                Time Calculation:    Time Calculation- SLP        Row Name 05/05/25 1033             Time Calculation- SLP    SLP Start Time 0815  -TB      SLP Stop Time 0915  -TB      SLP Time Calculation (min) 60 min  -TB      SLP Received On 05/05/25  -TB         Untimed Charges    SLP Eval/Re-eval  ST Eval Oral Pharyng Swallow - 85732  -TB      [id number]-CD Eval Oral Pharyng Swallow Minutes 60  -TB         Total Minutes    Untimed Charges Total Minutes 60  -TB       Total Minutes 60  -TB                User Key  (r) = Recorded By, (t) = Taken By, (c) = Cosigned By      Initials Name Provider Type    TB Alison Boogie SLP Speech and Language Pathologist                    Therapy Charges for Today       Code Description Service Date Service Provider Modifiers Qty    [id number] HC ST EVAL ORAL PHARYNG SWALLOW 4 5/5/2025 Alison Boogie SLP GN 1                 SAM Wagoner  5/5/2025

## 2025-05-06 ENCOUNTER — TELEPHONE (OUTPATIENT)
Dept: ONCOLOGY | Facility: HOSPITAL | Age: 60
End: 2025-05-06
Payer: COMMERCIAL

## 2025-05-06 ENCOUNTER — HOSPITAL ENCOUNTER (EMERGENCY)
Facility: HOSPITAL | Age: 60
Discharge: HOME | End: 2025-05-07
Attending: EMERGENCY MEDICINE | Admitting: EMERGENCY MEDICINE
Payer: COMMERCIAL

## 2025-05-06 ENCOUNTER — APPOINTMENT (OUTPATIENT)
Dept: CT IMAGING | Facility: HOSPITAL | Age: 60
End: 2025-05-06
Payer: COMMERCIAL

## 2025-05-06 ENCOUNTER — APPOINTMENT (OUTPATIENT)
Dept: GENERAL RADIOLOGY | Facility: HOSPITAL | Age: 60
End: 2025-05-06
Payer: COMMERCIAL

## 2025-05-06 VITALS
TEMPERATURE: 98.2 F | DIASTOLIC BLOOD PRESSURE: 79 MMHG | OXYGEN SATURATION: 96 % | RESPIRATION RATE: 18 BRPM | SYSTOLIC BLOOD PRESSURE: 127 MMHG | HEART RATE: 83 BPM

## 2025-05-06 DIAGNOSIS — R13.10 DYSPHAGIA, UNSPECIFIED TYPE: ICD-10-CM

## 2025-05-06 DIAGNOSIS — G89.29 OTHER CHRONIC PAIN: Primary | ICD-10-CM

## 2025-05-06 DIAGNOSIS — C34.90 PRIMARY MALIGNANT NEOPLASM OF LUNG METASTATIC TO OTHER SITE, UNSPECIFIED LATERALITY: ICD-10-CM

## 2025-05-06 LAB
ALBUMIN SERPL-MCNC: 3.6 G/DL (ref 3.5–5.2)
ALBUMIN/GLOB SERPL: 1.2 G/DL
ALP SERPL-CCNC: 100 U/L (ref 39–117)
ALT SERPL W P-5'-P-CCNC: 13 U/L (ref 1–33)
AMORPH URATE CRY URNS QL MICRO: ABNORMAL /HPF
ANION GAP SERPL CALCULATED.3IONS-SCNC: 10.2 MMOL/L (ref 5–15)
AST SERPL-CCNC: 11 U/L (ref 1–32)
BACTERIA UR QL AUTO: ABNORMAL /HPF
BASOPHILS # BLD AUTO: 0.01 10*3/MM3 (ref 0–0.2)
BASOPHILS NFR BLD AUTO: 0.2 % (ref 0–1.5)
BILIRUB SERPL-MCNC: 0.3 MG/DL (ref 0–1.2)
BILIRUB UR QL STRIP: NEGATIVE
BUN SERPL-MCNC: 10 MG/DL (ref 6–20)
BUN/CREAT SERPL: 20 (ref 7–25)
CALCIUM SPEC-SCNC: 9.3 MG/DL (ref 8.6–10.5)
CHLORIDE SERPL-SCNC: 97 MMOL/L (ref 98–107)
CLARITY UR: ABNORMAL
CO2 SERPL-SCNC: 27.8 MMOL/L (ref 22–29)
COLOR UR: YELLOW
CREAT SERPL-MCNC: 0.5 MG/DL (ref 0.57–1)
DEPRECATED RDW RBC AUTO: 44.5 FL (ref 37–54)
EGFRCR SERPLBLD CKD-EPI 2021: 108.2 ML/MIN/1.73
EOSINOPHIL # BLD AUTO: 0.01 10*3/MM3 (ref 0–0.4)
EOSINOPHIL NFR BLD AUTO: 0.2 % (ref 0.3–6.2)
ERYTHROCYTE [DISTWIDTH] IN BLOOD BY AUTOMATED COUNT: 14.6 % (ref 12.3–15.4)
FUNGUS WND CULT: NORMAL
GEN 5 1HR TROPONIN T REFLEX: 18 NG/L
GLOBULIN UR ELPH-MCNC: 2.9 GM/DL
GLUCOSE SERPL-MCNC: 346 MG/DL (ref 65–99)
GLUCOSE UR STRIP-MCNC: ABNORMAL MG/DL
HCT VFR BLD AUTO: 45.3 % (ref 34–46.6)
HGB BLD-MCNC: 14 G/DL (ref 12–15.9)
HGB UR QL STRIP.AUTO: NEGATIVE
HOLD SPECIMEN: NORMAL
HOLD SPECIMEN: NORMAL
HYALINE CASTS UR QL AUTO: ABNORMAL /LPF
IMM GRANULOCYTES # BLD AUTO: 0.03 10*3/MM3 (ref 0–0.05)
IMM GRANULOCYTES NFR BLD AUTO: 0.6 % (ref 0–0.5)
KETONES UR QL STRIP: ABNORMAL
LEUKOCYTE ESTERASE UR QL STRIP.AUTO: ABNORMAL
LIPASE SERPL-CCNC: 48 U/L (ref 13–60)
LYMPHOCYTES # BLD AUTO: 0.53 10*3/MM3 (ref 0.7–3.1)
LYMPHOCYTES NFR BLD AUTO: 10.6 % (ref 19.6–45.3)
MAGNESIUM SERPL-MCNC: 1.8 MG/DL (ref 1.6–2.6)
MCH RBC QN AUTO: 26.1 PG (ref 26.6–33)
MCHC RBC AUTO-ENTMCNC: 30.9 G/DL (ref 31.5–35.7)
MCV RBC AUTO: 84.4 FL (ref 79–97)
MONOCYTES # BLD AUTO: 0.25 10*3/MM3 (ref 0.1–0.9)
MONOCYTES NFR BLD AUTO: 5 % (ref 5–12)
MYCOBACTERIUM SPEC CULT: NORMAL
MYCOBACTERIUM SPEC CULT: NORMAL
NEUTROPHILS NFR BLD AUTO: 4.19 10*3/MM3 (ref 1.7–7)
NEUTROPHILS NFR BLD AUTO: 83.4 % (ref 42.7–76)
NIGHT BLUE STAIN TISS: NORMAL
NITRITE UR QL STRIP: NEGATIVE
NRBC BLD AUTO-RTO: 0 /100 WBC (ref 0–0.2)
NT-PROBNP SERPL-MCNC: 395.4 PG/ML (ref 0–900)
PH UR STRIP.AUTO: 7 [PH] (ref 5–8)
PLATELET # BLD AUTO: 164 10*3/MM3 (ref 140–450)
PMV BLD AUTO: 10.1 FL (ref 6–12)
POTASSIUM SERPL-SCNC: 4.4 MMOL/L (ref 3.5–5.2)
PROT SERPL-MCNC: 6.5 G/DL (ref 6–8.5)
PROT UR QL STRIP: NEGATIVE
RBC # BLD AUTO: 5.37 10*6/MM3 (ref 3.77–5.28)
RBC # UR STRIP: ABNORMAL /HPF
REF LAB TEST METHOD: ABNORMAL
SODIUM SERPL-SCNC: 135 MMOL/L (ref 136–145)
SP GR UR STRIP: >1.03 (ref 1–1.03)
SQUAMOUS #/AREA URNS HPF: ABNORMAL /HPF
TROPONIN T % DELTA: 6
TROPONIN T NUMERIC DELTA: 1 NG/L
TROPONIN T SERPL HS-MCNC: 17 NG/L
UROBILINOGEN UR QL STRIP: ABNORMAL
WBC # UR STRIP: ABNORMAL /HPF
WBC NRBC COR # BLD AUTO: 5.02 10*3/MM3 (ref 3.4–10.8)
WHOLE BLOOD HOLD COAG: NORMAL
WHOLE BLOOD HOLD SPECIMEN: NORMAL
YEAST URNS QL MICRO: ABNORMAL /HPF

## 2025-05-06 PROCEDURE — 83880 ASSAY OF NATRIURETIC PEPTIDE: CPT | Performed by: EMERGENCY MEDICINE

## 2025-05-06 PROCEDURE — 96374 THER/PROPH/DIAG INJ IV PUSH: CPT

## 2025-05-06 PROCEDURE — 96375 TX/PRO/DX INJ NEW DRUG ADDON: CPT

## 2025-05-06 PROCEDURE — 93005 ELECTROCARDIOGRAM TRACING: CPT | Performed by: EMERGENCY MEDICINE

## 2025-05-06 PROCEDURE — 81001 URINALYSIS AUTO W/SCOPE: CPT | Performed by: EMERGENCY MEDICINE

## 2025-05-06 PROCEDURE — 25010000002 MORPHINE PER 10 MG: Performed by: EMERGENCY MEDICINE

## 2025-05-06 PROCEDURE — 36415 COLL VENOUS BLD VENIPUNCTURE: CPT

## 2025-05-06 PROCEDURE — 85025 COMPLETE CBC W/AUTO DIFF WBC: CPT | Performed by: EMERGENCY MEDICINE

## 2025-05-06 PROCEDURE — 99284 EMERGENCY DEPT VISIT MOD MDM: CPT

## 2025-05-06 PROCEDURE — 25810000003 SODIUM CHLORIDE 0.9 % SOLUTION: Performed by: EMERGENCY MEDICINE

## 2025-05-06 PROCEDURE — 93005 ELECTROCARDIOGRAM TRACING: CPT

## 2025-05-06 PROCEDURE — 83735 ASSAY OF MAGNESIUM: CPT | Performed by: EMERGENCY MEDICINE

## 2025-05-06 PROCEDURE — 25010000002 ONDANSETRON PER 1 MG: Performed by: EMERGENCY MEDICINE

## 2025-05-06 PROCEDURE — 83690 ASSAY OF LIPASE: CPT | Performed by: EMERGENCY MEDICINE

## 2025-05-06 PROCEDURE — 70490 CT SOFT TISSUE NECK W/O DYE: CPT

## 2025-05-06 PROCEDURE — 80053 COMPREHEN METABOLIC PANEL: CPT | Performed by: EMERGENCY MEDICINE

## 2025-05-06 PROCEDURE — 84484 ASSAY OF TROPONIN QUANT: CPT | Performed by: EMERGENCY MEDICINE

## 2025-05-06 PROCEDURE — 25010000002 FAMOTIDINE 10 MG/ML SOLUTION: Performed by: EMERGENCY MEDICINE

## 2025-05-06 PROCEDURE — 96376 TX/PRO/DX INJ SAME DRUG ADON: CPT

## 2025-05-06 PROCEDURE — 71045 X-RAY EXAM CHEST 1 VIEW: CPT

## 2025-05-06 RX ORDER — FAMOTIDINE 10 MG/ML
20 INJECTION, SOLUTION INTRAVENOUS ONCE
Status: COMPLETED | OUTPATIENT
Start: 2025-05-06 | End: 2025-05-06

## 2025-05-06 RX ORDER — SODIUM CHLORIDE 0.9 % (FLUSH) 0.9 %
10 SYRINGE (ML) INJECTION AS NEEDED
Status: DISCONTINUED | OUTPATIENT
Start: 2025-05-06 | End: 2025-05-07 | Stop reason: HOSPADM

## 2025-05-06 RX ORDER — ASPIRIN 81 MG/1
324 TABLET, CHEWABLE ORAL ONCE
Status: DISCONTINUED | OUTPATIENT
Start: 2025-05-06 | End: 2025-05-07 | Stop reason: HOSPADM

## 2025-05-06 RX ORDER — ONDANSETRON 2 MG/ML
4 INJECTION INTRAMUSCULAR; INTRAVENOUS ONCE
Status: COMPLETED | OUTPATIENT
Start: 2025-05-06 | End: 2025-05-06

## 2025-05-06 RX ORDER — LIDOCAINE HYDROCHLORIDE 20 MG/ML
5 SOLUTION OROPHARYNGEAL ONCE
Status: COMPLETED | OUTPATIENT
Start: 2025-05-06 | End: 2025-05-06

## 2025-05-06 RX ADMIN — ONDANSETRON 4 MG: 2 INJECTION INTRAMUSCULAR; INTRAVENOUS at 19:31

## 2025-05-06 RX ADMIN — LIDOCAINE HYDROCHLORIDE 5 ML: 20 SOLUTION ORAL at 19:31

## 2025-05-06 RX ADMIN — FAMOTIDINE 20 MG: 10 INJECTION INTRAVENOUS at 19:30

## 2025-05-06 RX ADMIN — MORPHINE SULFATE 4 MG: 4 INJECTION, SOLUTION INTRAMUSCULAR; INTRAVENOUS at 19:31

## 2025-05-06 RX ADMIN — MORPHINE SULFATE 4 MG: 4 INJECTION, SOLUTION INTRAMUSCULAR; INTRAVENOUS at 21:57

## 2025-05-06 RX ADMIN — SODIUM CHLORIDE 1000 ML: 9 INJECTION, SOLUTION INTRAVENOUS at 19:31

## 2025-05-06 NOTE — TELEPHONE ENCOUNTER
Patients daughter stopped by the office today to inquire about the referral being sent to our office which we have not received. As well as called 2 times same day for update.   Patient was last seen here in 2023 but requested to transfer care. Daughter Sheila would like to know if patient can be seen here ASAP as they have lost their transportation through [employer] and can't make it to [facility]. Daughter states she will be present at each appointment   I talked with Sheila on her last call and lt her know that Dr. Haywood will have to approve adding her back to her schedule as patient requested transfer of care as she did not like the provider or the care she was receiving here back in 2023.   Sheila is aware that we will not be able to get back with her today but would do our best by end of day tomorrow.

## 2025-05-07 LAB
BACTERIA SPEC AEROBE CULT: ABNORMAL
GRAM STN SPEC: ABNORMAL
GRAM STN SPEC: ABNORMAL
QT INTERVAL: 409 MS
QTC INTERVAL: 489 MS

## 2025-05-07 NOTE — TELEPHONE ENCOUNTER
Spoke with patients daughter as patient was not available at this time. Daughter stated that she will be available tomorrow. I will plan to call around 9:00 am.

## 2025-05-07 NOTE — DISCHARGE INSTRUCTIONS
Please follow-up with your pain management doctor discussed possible need to increase your pain medicine for pain control    Please return to the emergency room for increasing shortness of breath, fever, intractable vomiting, altered mental status or any new symptoms you are concerned

## 2025-05-07 NOTE — ED PROVIDER NOTES
Time: 11:44 PM EDT  Date of encounter:  5/6/2025  Independent Historian/Clinical History and Information was obtained by:   Patient  Chief Complaint: Chronic diffuse pain    History is limited by: N/A    History of Present Illness:  Patient is a 59 y.o. year old female who presents to the emergency department for evaluation of chronic diffuse pain.  Patient states she comes to the emergency room because of chronic pain all over.  The patient notes that she has pain in her extremities legs chest abdomen and back.  She notes that she aches all over.  The patient has had nausea with some intermittent vomiting.  She also notes some shortness of breath but feels this is chronic and unchanged from her COPD.  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  The patient also notes that she has had a recent diagnosis and surgery to the left side of her face and she has been diagnosed with lymphoma.  The patient is also had recent radiation.  The patient has not had recent chemotherapy.  She was treated with chemotherapy many years ago for non-Hodgkin's lymphoma.  The patient does have chronic laryngitis from radiation to her neck.  Patient does note she is also having some difficulty swallowing or painful swallowing.  She has had some intermittent nausea vomiting.  She denies any fevers or rigors.  She denies any diarrhea.  She denies any diaphoresis.  The patient does note that she does have a pain pump and she goes to the pain clinic.  The patient also notes that palliative care is supposed to see her in several days.    HPI    Patient Care Team  Primary Care Provider: Virgil Salas MD    Past Medical History:     Allergies   Allergen Reactions    Amoxicillin Shortness Of Breath     Has tolerated Cefazolin, Ceftriaxone, and Cefepime -CONSTANZA Garner [Cefaclor] Shortness Of Breath     Has tolerated Cefazolin, Ceftriaxone, and Cefepime  -Jermaine Montez, [facility]      Penicillins Shortness Of Breath     Has tolerated Cefazolin, Ceftriaxone, and Cefepime -Jermaine Montez, [facility]    Sulfa Antibiotics Rash    Valium [Diazepam] Mental Status Change     DEPRESSED    Ambien [Zolpidem] Mental Status Change    Aspirin GI Intolerance    Ativan [Lorazepam] Mental Status Change    Benadryl [Diphenhydramine] Anxiety     AND MAKES HER HYPER    Biaxin [Clarithromycin] Unknown - Low Severity    Cephalexin Unknown - Low Severity    Clindamycin Unknown - Low Severity    Compazine [Prochlorperazine Edisylate] Rash    Contrast Dye (Echo Or Unknown Ct/Mr) Other (See Comments)     Caused pain in her arm    Doxycycline Rash    Nsaids GI Intolerance    Phenergan [Promethazine Hcl] GI Intolerance    Promethazine GI Intolerance    Vancomycin Itching     Past Medical History:   Diagnosis Date    Anesthesia     REPORTS HAS GOT REAL EMOTIONAL AND HAS BECOME ANGRY BEFORE    Anxiety     Aortic stenosis, severe     HAS BIO VALVE REPLACED    Arthritis     Asthma     Back pain     Blood clotting disorder     test to find out what type    Cancer     CHF (congestive heart failure)     Chronic low back pain with sciatica     Chronic pain disorder     COPD (chronic obstructive pulmonary disease)     Coronary artery disease     looking to follow with Cardio    Diabetes mellitus     TYPE 2    Dyspnea on effort     Fatty liver     GERD (gastroesophageal reflux disease)     H/O lumpectomy     H/O: hysterectomy     Hiatal hernia     History of degenerative disc disease     History of kidney stones     History of pulmonary embolus (PE)     History of transfusion     AS A CHILD NO TRANSFUSION REACTION    Hodgkin's lymphoma     5/19/23  5 YEARS SINCE LAST CHEMO TX    Hypertension     Immobility     Lupus     Nausea vomiting and diarrhea 03/07/2024    Panic disorder     Pelvic pain     Peripheral neuropathy     Personal history of DVT (deep vein thrombosis)     over 20 years    PONV (postoperative  nausea and vomiting)     Presence of intrathecal pump     PTSD (post-traumatic stress disorder)     Sacroiliac joint disease     SI (sacroiliac) joint inflammation     Venous insufficiency      Past Surgical History:   Procedure Laterality Date    ABDOMINAL SURGERY      BACK SURGERY      SPINAL FUSION     BACK SURGERY      BLADDER REPAIR      BRONCHOSCOPY WITH ION ROBOTIC ASSIST N/A 4/22/2025    Procedure: BRONCHOSCOPY WITH ION ROBOT: REBUS, EBUS, NEEDLE ASPIRATES, BIOPSIES, BRUSHINGS, BAL, WASHINGS: insertion of lighted robot navigated instrument to view inside the lung;  Surgeon: Enzo Segal MD;  Location: [facility] MAIN OR;  Service: Robotics - Pulmonary;  Laterality: N/A;    CARDIAC CATHETERIZATION N/A 03/06/2019    Procedure: Valvuloplasty;  Surgeon: Wayne Johnson MD;  Location: [facility] INVASIVE LOCATION;  Service: Cardiology    CARDIAC CATHETERIZATION      CARDIAC CATHETERIZATION Left 05/31/2023    Procedure: Cardiac Catheterization/Vascular Study;  Surgeon: Poncho Reid MD;  Location: [facility] CATH INVASIVE LOCATION;  Service: Cardiovascular;  Laterality: Left;    CARDIAC SURGERY      Mechanical valve    CARDIAC VALVE REPLACEMENT  2021    CHOLECYSTECTOMY      COLONOSCOPY N/A 12/29/2021    Procedure: COLONOSCOPY;  Surgeon: Karine Orlando MD;  Location: [facility] ENDOSCOPY;  Service: Gastroenterology;  Laterality: N/A;  POOR PREP    COLONOSCOPY N/A 04/13/2022    Procedure: COLONOSCOPY WITH POLYPECTOMY;  Surgeon: Karine Orlando MD;  Location: [facility] ENDOSCOPY;  Service: Gastroenterology;  Laterality: N/A;  COLON POLYP, HEMORRHOIDS, POOR PREP    COLONOSCOPY      DIRECT LARYNGOSCOPY, ESOPHAGOSCOPY, BRONCHOSCOPY N/A 05/22/2023    Procedure: DIRECT LARYNGOSCOPY WITH BIOPSIES , ESOPHAGOSCOPY, BRONCHOSCOPY;  Surgeon: Ronnie Mcgarry MD;  Location: [facility] OR [facility];  Service: ENT;  Laterality: N/A;    ENDOSCOPY N/A 12/29/2021    Procedure: ESOPHAGOGASTRODUODENOSCOPY;  Surgeon:  Karine Orlando MD;  Location: [facility] ENDOSCOPY;  Service: Gastroenterology;  Laterality: N/A;  ESOPHAGITIS, GASTRITIS, HIATAL HERNIA    ENDOSCOPY      ENDOSCOPY N/A 04/16/2024    Procedure: ESOPHAGOGASTRODUODENOSCOPY WITH BIOPSIES;  Surgeon: Karine Orlando MD;  Location: [facility] ENDOSCOPY;  Service: Gastroenterology;  Laterality: N/A;  ESOPHAGITIS, HIATAL HERNIA    ENDOSCOPY W/ PEG TUBE PLACEMENT N/A 12/10/2024    Procedure: ESOPHAGOGASTRODUODENOSCOPY WITH PERCUTANEOUS ENDOSCOPIC GASTROSTOMY TUBE INSERTION WITH ANESTHESIA;  Surgeon: Rodger Ortega MD;  Location: [facility] ENDOSCOPY;  Service: Gastroenterology;  Laterality: N/A;  PEG TUBE INSERTION    HYSTERECTOMY      LYMPHADENECTOMY      PAIN PUMP INSERTION/REVISION N/A 10/18/2019    Procedure: PAIN PUMP INSERTION [facility] 10-18-19 [place];  Surgeon: Horace Rios MD;  Location: [facility];  Service: Pain Management    PAIN PUMP INSERTION/REVISION N/A 11/23/2020    Procedure: pain pump removal;  Surgeon: Horace Rios MD;  Location: [facility];  Service: Pain Management;  Laterality: N/A;    PEG TUBE INSERTION N/A 07/26/2023    Procedure: PERCUTANEOUS ENDOSCOPIC GASTROSTOMY TUBE INSERTION;  Surgeon: Kody Mercer MD;  Location: [facility] ENDOSCOPY;  Service: General;  Laterality: N/A;  SUCCESSFUL PEG TUBE PLACE PLACEMENT    PORTACATH PLACEMENT      IN LEFT CHEST REPORTS THAT IT IS NOT FUNCTIONING    SKIN BIOPSY      TONSILLECTOMY      TUBAL ABDOMINAL LIGATION      TUMOR REMOVAL      vaginal /anal area     Family History   Problem Relation Age of Onset    Heart failure Mother     Anxiety disorder Mother     Prostate cancer Father     Depression Sister     Bipolar disorder Sister     Pancreatic cancer Sister     ADD / ADHD Brother     Thyroid cancer Maternal Grandmother     Pancreatic cancer Paternal Grandmother     ADD / ADHD Grandson     ADD / ADHD Granddaughter     ADD / ADHD Nephew     Malig Hyperthermia Neg Hx        Home  Medications:  Prior to Admission medications    Medication Sig Start Date End Date Taking? Authorizing Provider   acetaminophen (Tylenol) 325 MG tablet Take 1 tablet by mouth Every 6 (Six) Hours As Needed for Mild Pain, Headache or Fever.    Nette Jiménez MD   ALPRAZolam (XANAX) 0.25 MG tablet Take 1 tablet by mouth 3 times a day. 11/25/24   Nette Jiménez MD   arformoterol (Brovana) 15 MCG/2ML nebulizer solution Take 2 mL by nebulization 2 (Two) Times a Day for 30 days. 4/9/25 5/9/25  Xochitl Estrada APRN   budesonide (Pulmicort) 0.5 MG/2ML nebulizer solution Take 2 mL by nebulization 2 (Two) Times a Day for 30 days. Rinse mouth out after each use 4/9/25 5/9/25  Xochitl Estrada APRN   famotidine (Pepcid) 40 MG tablet Take 1 tablet by mouth every night at bedtime. 3/27/25   Sobeida Espinosa APRN   furosemide (LASIX) 20 MG tablet Take 1 tablet by mouth Daily.    Nette Jiménez MD   Insulin Lispro, 1 Unit Dial, (HUMALOG) 100 UNIT/ML solution pen-injector Inject 14 Units under the skin into the appropriate area as directed Daily. 2/14/25   Nette Jiménez MD   ipratropium-albuterol (DUO-NEB) 0.5-2.5 mg/3 ml nebulizer Take 3 mL by nebulization 4 (Four) Times a Day As Needed for Wheezing for up to 30 days. 4/9/25 5/9/25  Xochitl Estrada APRN   loperamide (IMODIUM) 2 MG capsule Take 1 capsule by mouth 4 (Four) Times a Day As Needed for Diarrhea (2 with 1st episode of diarrhea and 1 with each additonal episode, max of 8 per day.). 4/21/25   Sobeida Espinosa APRN   metoprolol succinate XL (TOPROL-XL) 25 MG 24 hr tablet Take 1 tablet by mouth every night at bedtime. 4/17/25   Fredis Chris MD   mupirocin (BACTROBAN) 2 % ointment Apply 1 Application topically to the appropriate area as directed 3 (Three) Times a Day. 4/10/24   Katia Wright MD   ondansetron ODT (ZOFRAN-ODT) 8 MG disintegrating tablet Place 1 tablet on the tongue Daily As Needed. 3/27/25   Provider, Nette,  MD   pain patient supplied pump by Intrathecal route Continuous. Type of Medication: Hydromorphone 15 mg/ml and Bupivacaine 0.5 mg/ml  [employer] [phone]  Provider:Dr. Boudreaux  Provider number: [phone]  Basal Dose: Hydromorphone 7.518 mg/day Bupivacaine 0.24319 mg/day  Pump capacity: 40ml  ( MAX of Hydromorphone 9.456 mg/ day; Bupivacaine 0.51477 mg /day)  Bolus Dose:Hydromorphone 0.500 mg, Bupivacaine 0.38091 mg  Max activations: 4 per day  Lockout 3 hours. 1 Bolus per every 3 hours   Last refill-  4/24/2025  Alarm date- 6/23/2025  Pump manufacture:MedManny Escobar MD   PARoxetine (PAXIL) 20 MG tablet Take 1 tablet by mouth Every Morning. 3/27/25   Nette Jiménez MD   predniSONE (DELTASONE) 5 MG tablet Take 3 tablets by mouth 2 (Two) Times a Day. 15mg in am 15mg in pm 5/5/25   Virgil Navarro MD   Systane 0.4-0.3 % solution ophthalmic solution (artificial tears) Administer 1-2 drops to both eyes 4 (Four) Times a Day As Needed. 1/31/25   Nette Jiménez MD        Social History:   Social History     Tobacco Use    Smoking status: Every Day     Current packs/day: 2.00     Average packs/day: 2.0 packs/day for 46.3 years (92.7 ttl pk-yrs)     Types: Cigarettes     Start date: 1979     Passive exposure: Current    Smokeless tobacco: Never    Tobacco comments:     Currently smoking 1.5 daily. 04--js   Vaping Use    Vaping status: Never Used   Substance Use Topics    Alcohol use: Never    Drug use: Never         Review of Systems:  Review of Systems   Constitutional:  Positive for fatigue. Negative for chills, diaphoresis and fever.   HENT:  Positive for sore throat, trouble swallowing and voice change. Negative for congestion, facial swelling, postnasal drip and rhinorrhea.    Eyes:  Negative for photophobia.   Respiratory:  Positive for cough, shortness of breath and wheezing. Negative for chest tightness.    Cardiovascular:  Negative for chest pain, palpitations and leg swelling.    Gastrointestinal:  Positive for abdominal pain, nausea and vomiting. Negative for diarrhea.   Genitourinary:  Negative for difficulty urinating, dysuria, flank pain, frequency, hematuria and urgency.   Musculoskeletal:  Positive for arthralgias, back pain and myalgias. Negative for neck pain and neck stiffness.   Skin:  Negative for pallor, rash and wound.   Neurological:  Negative for dizziness, syncope, weakness, numbness and headaches.   Hematological:  Negative for adenopathy. Does not bruise/bleed easily.   Psychiatric/Behavioral: Negative.          Physical Exam:  /79   Pulse 83   Temp 98.2 °F (36.8 °C) (Oral)   Resp 18   LMP  (LMP Unknown)   SpO2 96%     Physical Exam  Vitals and nursing note reviewed.   Constitutional:       General: She is not in acute distress.     Appearance: Normal appearance. She is not ill-appearing, toxic-appearing or diaphoretic.   HENT:      Head: Normocephalic and atraumatic. No right periorbital erythema or left periorbital erythema.      Jaw: There is normal jaw occlusion. Swelling present. No tenderness, pain on movement or malocclusion.        Comments: The patient has a scar on the left side of her face.  Appears to be from recent surgery.  There is no dehiscence.  There is no erythema.  There is no crepitance.  There is no focal abscess.  It appears to be nontender     Mouth/Throat:      Mouth: Mucous membranes are moist.      Pharynx: Oropharynx is clear. Uvula midline. No pharyngeal swelling, oropharyngeal exudate, posterior oropharyngeal erythema or uvula swelling.   Eyes:      Pupils: Pupils are equal, round, and reactive to light.   Neck:      Thyroid: No thyroid mass.      Vascular: No carotid bruit.      Trachea: Trachea normal.      Comments: The patient appears to have chronic thickening of the skin on the left side of her neck.  The patient states this is chronic and unchanged from radiation      Cardiovascular:      Rate and Rhythm: Normal rate and  regular rhythm.      Pulses: Normal pulses.           Carotid pulses are 2+ on the right side and 2+ on the left side.       Radial pulses are 2+ on the right side and 2+ on the left side.        Femoral pulses are 2+ on the right side and 2+ on the left side.       Popliteal pulses are 2+ on the right side and 2+ on the left side.        Dorsalis pedis pulses are 2+ on the right side and 2+ on the left side.        Posterior tibial pulses are 2+ on the right side and 2+ on the left side.      Heart sounds: Normal heart sounds. No murmur heard.  Pulmonary:      Effort: Pulmonary effort is normal. No accessory muscle usage, respiratory distress or retractions.      Breath sounds: Examination of the right-upper field reveals decreased breath sounds. Examination of the left-upper field reveals decreased breath sounds and rhonchi. Examination of the right-middle field reveals decreased breath sounds. Examination of the left-middle field reveals decreased breath sounds and rhonchi. Examination of the right-lower field reveals decreased breath sounds. Examination of the left-lower field reveals decreased breath sounds. Decreased breath sounds and rhonchi present. No wheezing or rales.   Chest:      Chest wall: No mass or tenderness.   Abdominal:      General: Abdomen is flat. There is no distension.      Palpations: Abdomen is soft. There is no mass or pulsatile mass.      Tenderness: There is no abdominal tenderness. There is no right CVA tenderness, left CVA tenderness, guarding or rebound.      Comments: No rigidity    The patient has a G-tube located in the mid abdomen   Musculoskeletal:         General: No swelling, tenderness or deformity.      Cervical back: Neck supple. No tenderness. No pain with movement, spinous process tenderness or muscular tenderness. Decreased range of motion.      Thoracic back: No tenderness or bony tenderness. Decreased range of motion.      Lumbar back: No tenderness or bony tenderness.  Decreased range of motion.      Right lower leg: No tenderness. No edema.      Left lower leg: No tenderness. No edema.   Lymphadenopathy:      Cervical: Cervical adenopathy present.      Left cervical: Deep cervical adenopathy present.   Skin:     General: Skin is warm and dry.      Capillary Refill: Capillary refill takes less than 2 seconds.      Coloration: Skin is not jaundiced or pale.      Findings: No erythema.   Neurological:      General: No focal deficit present.      Mental Status: She is alert and oriented to person, place, and time. Mental status is at baseline.      Cranial Nerves: Cranial nerves 2-12 are intact. No cranial nerve deficit.      Sensory: Sensation is intact. No sensory deficit.      Motor: Motor function is intact. No weakness or pronator drift.      Coordination: Coordination is intact. Coordination normal.   Psychiatric:         Mood and Affect: Mood normal.         Behavior: Behavior normal.                  Procedures:  Procedures      Medical Decision Making:      Comorbidities that affect care:    Hodgkin's lymphoma, lupus, peripheral neuropathy, chronic back pain with sciatica, aortic stenosis, anxiety, diabetes, DVT, pulmonary embolism, chronic pain disorder, PTSD, congestive heart failure, coronary artery disease, COPD, current smoker, GERD, hypertension, recent diagnosis of lung cancer with metastasis    External Notes reviewed:    None      The following orders were placed and all results were independently analyzed by me:  Orders Placed This Encounter   Procedures    XR Chest 1 View    CT Soft Tissue Neck Without Contrast    [place] Draw    High Sensitivity Troponin T    Comprehensive Metabolic Panel    Lipase    BNP    Magnesium    CBC Auto Differential    Urinalysis With Microscopic If Indicated (No Culture) - Urine, Clean Catch    High Sensitivity Troponin T 1Hr    Urinalysis, Microscopic Only - Urine, Clean Catch    NPO Diet NPO Type: Strict NPO    Undress & Gown     Continuous Pulse Oximetry    Vital Signs    Orthostatic Blood Pressure    Oxygen Therapy- Nasal Cannula; Titrate 1-6 LPM Per SpO2; 90 - 95%    POC Glucose Once    ECG 12 Lead ED Triage Standing Order; Chest Pain    ECG 12 Lead ED Triage Standing Order; Chest Pain    Insert Peripheral IV    Fall Precautions    CBC & Differential    Green Top (Gel)    Lavender Top    Gold Top - SST    Light Blue Top       Medications Given in the Emergency Department:  Medications   sodium chloride 0.9 % flush 10 mL (has no administration in time range)   aspirin chewable tablet 324 mg (has no administration in time range)   sodium chloride 0.9 % bolus 1,000 mL (0 mL Intravenous Stopped 5/6/25 2157)   morphine injection 4 mg (4 mg Intravenous Given 5/6/25 1931)   famotidine (PEPCID) injection 20 mg (20 mg Intravenous Given 5/6/25 1930)   ondansetron (ZOFRAN) injection 4 mg (4 mg Intravenous Given 5/6/25 1931)   Lidocaine Viscous HCl (XYLOCAINE) 2 % solution 5 mL (5 mL Mouth/Throat Given 5/6/25 1931)   morphine injection 4 mg (4 mg Intravenous Given 5/6/25 2157)        ED Course:    ED Course as of 05/07/25 0001   Tue May 06, 2025   1803 EKG:    Rhythm: Sinus rhythm  Rate: 86  Intervals: Normal WI and QT  Left bundle branch block present  T-wave: T wave inversion in V6, I, aVL, most consistent with the left bundle branch block  ST Segment: ST elevation in V1 through V3 most consistent with the interventricular conduction delay.  There is no obvious STEMI    EKG Comparison: No change in the QRS and ST morphology from the EKG performed May 4, 2025.    Interpreted by me   [SD]      ED Course User Index  [SD] Poncho Connor DO       Labs:    Lab Results (last 24 hours)       Procedure Component Value Units Date/Time    High Sensitivity Troponin T [[id number]]  (Abnormal) Collected: 05/06/25 1921    Specimen: Blood Updated: 05/06/25 1959     HS Troponin T 17 ng/L     Narrative:      High Sensitive Troponin T Reference Range:  <14.0 ng/L-  Negative Female for AMI  <22.0 ng/L- Negative Male for AMI  >=14 - Abnormal Female indicating possible myocardial injury.  >=22 - Abnormal Male indicating possible myocardial injury.   Clinicians would have to utilize clinical acumen, EKG, Troponin, and serial changes to determine if it is an Acute Myocardial Infarction or myocardial injury due to an underlying chronic condition.         CBC & Differential [[id number]]  (Abnormal) Collected: 05/06/25 1921    Specimen: Blood Updated: 05/06/25 1936    Narrative:      The following orders were created for panel order CBC & Differential.  Procedure                               Abnormality         Status                     ---------                               -----------         ------                     CBC Auto Differential[[id number]]        Abnormal            Final result                 Please view results for these tests on the individual orders.    Comprehensive Metabolic Panel [[id number]]  (Abnormal) Collected: 05/06/25 1921    Specimen: Blood Updated: 05/06/25 1959     Glucose 346 mg/dL      BUN 10 mg/dL      Creatinine 0.50 mg/dL      Sodium 135 mmol/L      Potassium 4.4 mmol/L      Chloride 97 mmol/L      CO2 27.8 mmol/L      Calcium 9.3 mg/dL      Total Protein 6.5 g/dL      Albumin 3.6 g/dL      ALT (SGPT) 13 U/L      AST (SGOT) 11 U/L      Alkaline Phosphatase 100 U/L      Total Bilirubin 0.3 mg/dL      Globulin 2.9 gm/dL      A/G Ratio 1.2 g/dL      BUN/Creatinine Ratio 20.0     Anion Gap 10.2 mmol/L      eGFR 108.2 mL/min/1.73     Narrative:      GFR Categories in Chronic Kidney Disease (CKD)              GFR Category          GFR (mL/min/1.73)    Interpretation  G1                    90 or greater        Normal or high (1)  G2                    60-89                Mild decrease (1)  G3a                   45-59                Mild to moderate decrease  G3b                   30-44                Moderate to severe decrease  G4                     15-29                Severe decrease  G5                    14 or less           Kidney failure    (1)In the absence of evidence of kidney disease, neither GFR category G1 or G2 fulfill the criteria for CKD.    eGFR calculation 2021 CKD-EPI creatinine equation, which does not include race as a factor    Lipase [[id number]]  (Normal) Collected: 05/06/25 1921    Specimen: Blood Updated: 05/06/25 1959     Lipase 48 U/L     BNP [[id number]]  (Normal) Collected: 05/06/25 1921    Specimen: Blood Updated: 05/06/25 1954     proBNP 395.4 pg/mL     Narrative:      This assay is used as an aid in the diagnosis of individuals suspected of having heart failure. It can be used as an aid in the diagnosis of acute decompensated heart failure (ADHF) in patients presenting with signs and symptoms of ADHF to the emergency department (ED). In addition, NT-proBNP of <300 pg/mL indicates ADHF is not likely.    Age Range Result Interpretation  NT-proBNP Concentration (pg/mL:      <50             Positive            >450                   Gray                 300-450                    Negative             <300    50-75           Positive            >900                  Gray                300-900                  Negative            <300      >75             Positive            >1800                  Gray                300-1800                  Negative            <300    Magnesium [[id number]]  (Normal) Collected: 05/06/25 1921    Specimen: Blood Updated: 05/06/25 1959     Magnesium 1.8 mg/dL     CBC Auto Differential [[id number]]  (Abnormal) Collected: 05/06/25 1921    Specimen: Blood Updated: 05/06/25 1936     WBC 5.02 10*3/mm3      RBC 5.37 10*6/mm3      Hemoglobin 14.0 g/dL      Hematocrit 45.3 %      MCV 84.4 fL      MCH 26.1 pg      MCHC 30.9 g/dL      RDW 14.6 %      RDW-SD 44.5 fl      MPV 10.1 fL      Platelets 164 10*3/mm3      Neutrophil % 83.4 %      Lymphocyte % 10.6 %      Monocyte % 5.0 %      Eosinophil % 0.2 %       Basophil % 0.2 %      Immature Grans % 0.6 %      Neutrophils, Absolute 4.19 10*3/mm3      Lymphocytes, Absolute 0.53 10*3/mm3      Monocytes, Absolute 0.25 10*3/mm3      Eosinophils, Absolute 0.01 10*3/mm3      Basophils, Absolute 0.01 10*3/mm3      Immature Grans, Absolute 0.03 10*3/mm3      nRBC 0.0 /100 WBC     Urinalysis With Microscopic If Indicated (No Culture) - Urine, Clean Catch [[id number]]  (Abnormal) Collected: 05/06/25 2006    Specimen: Urine, Clean Catch Updated: 05/06/25 2022     Color, UA Yellow     Appearance, UA Turbid     pH, UA 7.0     Specific Gravity, UA >1.030     Glucose, UA >=1000 mg/dL (3+)     Ketones, UA 40 mg/dL (2+)     Bilirubin, UA Negative     Blood, UA Negative     Protein, UA Negative     Leuk Esterase, UA Trace     Nitrite, UA Negative     Urobilinogen, UA 1.0 E.U./dL    Urinalysis, Microscopic Only - Urine, Clean Catch [[id number]]  (Abnormal) Collected: 05/06/25 2006    Specimen: Urine, Clean Catch Updated: 05/06/25 2044     RBC, UA 0-2 /HPF      WBC, UA 6-10 /HPF      Bacteria, UA Trace /HPF      Squamous Epithelial Cells, UA 0-2 /HPF      Yeast, UA Small/1+ Budding Yeast /HPF      Hyaline Casts, UA None Seen /LPF      Amorphous Crystals, UA Large/3+ /HPF      Methodology Manual Light Microscopy    High Sensitivity Troponin T 1Hr [[id number]]  (Abnormal) Collected: 05/06/25 2131    Specimen: Blood Updated: 05/06/25 2156     HS Troponin T 18 ng/L      Troponin T Numeric Delta 1 ng/L      Troponin T % Delta 6    Narrative:      High Sensitive Troponin T Reference Range:  <14.0 ng/L- Negative Female for AMI  <22.0 ng/L- Negative Male for AMI  >=14 - Abnormal Female indicating possible myocardial injury.  >=22 - Abnormal Male indicating possible myocardial injury.   Clinicians would have to utilize clinical acumen, EKG, Troponin, and serial changes to determine if it is an Acute Myocardial Infarction or myocardial injury due to an underlying chronic condition.                   Imaging:    CT Soft Tissue Neck Without Contrast  Result Date: 5/6/2025  CT SOFT TISSUE NECK WO CONTRAST Date of exam: 5/6/2025 10:03 PM EDT. Comparisons: 5/4/2025; 2/25/2025; 11/7/2024; 9/10/2024; 7/20/2024; 5/6/2024. INDICATIONS: 59-year-old female c/o neck pain & swelling (right-sided); c/o sore throat; h/o active laryngeal squamous cell carcinoma & remote Hodgkin's lymphoma. TECHNIQUE: Axial CT images were obtained of the neck without contrast administration. Reconstructed 2D coronal and sagittal images were also obtained. Automated exposure control and iterative construction methods were used. Please note that the lack of intravenous contrast agent administration may limit assessment, especially with regard to residual/recurrent laryngeal cell carcinoma as well as evaluation of cervical lymph nodes FINDINGS: When compared with the prior nonenhanced soft tissue neck CT of 11/7/2024, there is a new lymph node identified in the left submandibular region, which is probably a level 1B lymph node. It measures approximately 1.1 x 0.9 x 0.8 cm in anteroposterior (AP), transverse (TR), and craniocaudal (CC) extent, respectively, as seen on image 43 of series 3, image 49 of series 4, and image 41 of series 5. This finding is thought less likely to represent scarring or treatment effect. A metastatic lymph node is possible. Again, the lack of intravenous contrast agent may limit assessment. There may be slight asymmetry of the submandibular glands with the right slightly larger than the left (versus asymmetry in patient positioning). There is asymmetric prominence  of the posterior pharyngeal wall at the level of the hypopharynx and probably asymmetric thickening of the left aryepiglottic fold; similar findings were seen on prior exam from 11/7/2024 and are thought more likely to be related to treatment effect than to represent residual or recurrent tumor. Consider direct mucosal visualization for further assessment  if not recently performed. There is slight asymmetric aeration of the right piriform sinus. The narrowing of the airway at the level of the hypopharynx may be artifactual. Please correlate clinically. There is asymmetry of the soft tissues of the face, especially overlying the left mandible and involving the left premaxillary regions and areas near the left buccal space. These findings may be related to prior surgery and/or treatment effect. Please correlate clinically. There is also asymmetry of the soft tissues in the right parapharyngeal space and right carotid space, which is nonspecific but probably related to brawny induration from treatment effect. There is a partially visualized left-sided internal jugular (IJ) central venous line/port in place. Emphysematous changes involve the lungs. There is mild nodular pleural-parenchymal scarring in the anterior right pulmonary apex. Degenerative changes are seen throughout the imaged spine, especially at C5-6 and C6-7. There may be a prominent right central disc herniation at C4-5. No significant acute findings are seen with regard to the imaged brain orbits, paranasal sinuses, or middle ear clefts. There is no convincing nonenhanced CT evidence for focal sizable drainable (organized) fluid collection. More specifically, no definite nonenhanced CT evidence of tonsillar or peritonsillar fluid collection. No definite retropharyngeal  fluid collection is appreciated. No ectopic gas foci are appreciated. No definite acute sialadenitis involving the submandibular glands or acute parotitis. No sialolithiasis or sialodocholithiasis. Correlation with any more recent relevant imaging studies than the 11/7/2024 exam may be helpful if available. Overall, there is less motion artifact on the current study when compared with the 11/7/2024 CT exam.     1.When compared with the prior nonenhanced soft tissue neck CT of 11/7/2024, there is a new lymph node in the left submandibular  region, which is probably a level 1B lymph node. It measures approximately 1.1 x 0.9 x 0.8 cm. This finding is thought less likely to represent scarring or treatment effect. A metastatic lymph node is possible. 2.There is asymmetric prominence of the posterior pharyngeal wall at the level of the hypopharynx and probably asymmetric thickening of the left aryepiglottic fold. These findings are thought more likely to be related to treatment effect rather than to represent residual or recurrent tumor. Consider direct mucosal visualization for further assessment if not recently performed. 3.There is asymmetry of the soft tissues of the left face, relative to the right face, especially overlying the left mandible and involving the left premaxillary regions and areas near the left buccal space. These findings may be related to prior surgery  and/or treatment effect. 4.There is no convincing nonenhanced CT evidence for focal sizable drainable (organized) fluid collection. No ectopic gas foci are seen. 5.The narrowing of the airway at the level of the hypopharynx may be artifactual. Please correlate clinically. 6.Moderate-to-severe emphysematous changes involve the partially imaged lungs. 7.Please see above comments for further detail. Portions of this note were completed with a voice recognition program. Electronically Signed: Sony Hood MD  5/6/2025 10:46 PM EDT  Workstation ID: [id number]    XR Chest 1 View  Result Date: 5/6/2025  XR CHEST 1 VW Date of Exam: 5/6/2025 6:17 PM EDT Indication: Chest Pain Triage Protocol Comparison: AP chest x-ray 5/4/2025, CT chest 3/29/2025 Findings: Tip of the left internal jugular central venous port catheter terminates in the lower SVC. Known right lung nodule is better visualized on CT. Lungs are adequately expanded and appear clear. No pneumothorax or large pleural effusion is seen. Cardiomediastinal contours unchanged from CABG appear stable. There are surgical clips over the left  side of the chest.     Impression: No acute cardiopulmonary abnormality is identified. Electronically Signed: Karine Miranda MD  5/6/2025 6:24 PM EDT  Workstation ID: [id number]        Differential Diagnosis and Discussion:    Myalgia: Differential diagnosis includes but is not limited to muscle overuse or strain, infections, inflammatory conditions, autoimmune diseases, medications, metabolic disorders, fibromyalgia, vascular disorders, neurological conditions, psychological factors, toxins, and muscle disorders.    Labs were collected in the emergency department and all labs were reviewed and interpreted by me.  X-ray were performed in the emergency department and all X-ray impressions were independently interpreted by me.  An EKG was performed and the EKG was interpreted by me.    MDM  Number of Diagnoses or Management Options  Dysphagia, unspecified type  Other chronic pain  Primary malignant neoplasm of lung metastatic to other site, unspecified laterality  Diagnosis management comments: Patient's vital signs were stable while in the emergency room.  The patient was afebrile    Patient's EKG demonstrated sinus rhythm.  The patient has a left bundle branch block and changes consistent with that.  The patient had no changes in EKG from previous EKG    The patient's CBC was reviewed and shows no abnormalities of critical concern.  Of note, there is no anemia requiring a blood transfusion and the platelet count is acceptable    The patient had a normal proBNP.  This makes acute decompensated heart failure unlikely    The patient's CMP was reviewed and shows no abnormalities of critical concern.  Of note, the patient's sodium and potassium are acceptable.  The patient's liver enzymes are unremarkable.  The patient's renal function including creatinine is preserved.  The patient has a normal anion gap.    The patient's magnesium level is unremarkable and thus I do not feel is contributory to the patient's  symptoms    Patient's first troponin was 17.  It was repeated an hour later turned 18.  The delta of 1 is insignificant.  In comparison to old troponins.  The patient has had elevated troponins in the past.  Troponins for comparison were 42 and 36 2 days ago.  Indicating much better today    Chest x-ray was performed while in the emergency room.  The chest x-ray demonstrated no acute cardiopulmonary process    CT scan of the neck demonstrated a new lymph node in the left submandibular region is thought to possibly represent metastatic disease.  The patient does have known metastatic disease.  This is consistent with her current cancer.  I have discussed this with the patient he will follow-up with her oncologist.  There are chronic changes in the neck most likely related to previous radiation therapy.  The patient has postsurgical changes of the face.  There is no evidence of abscess or drainable fluid collection.  There is no ectopic gas.  No other acute abnormalities    The patient's whole-body diffuse pain was treated with morphine in the emergency room.  The patient was given viscous lidocaine for her pharyngitis.  The patient was given Pepcid and Zofran for nausea    I have spoken with this patient.  I have explained the patient's condition, diagnosis and treatment plan based on the information available to me at this time.  I have answered the patient's questions and addressed any concerns.  The patient has a good understanding of the patient's diagnosis condition and treatment plan as can be expected at this point.  The vital signs have been stable.  The patient's condition is stable and appropriate for discharge from the emergency department.     Patient will pursue further outpatient evaluation with the primary care physician or other designated or consulting physicians as outlined in the discharge instruction.  The patient is agreeable to this plan of care and follow-up instructions have been explained in  detail.  The patient has received these instructions in written format and has expressed understanding of the discharge instructions.  The patient is aware that any significant change in condition or worsening of symptoms should prompt immediate return to this or the closest emergency department or call 911         Amount and/or Complexity of Data Reviewed  Clinical lab tests: reviewed and ordered  Tests in the radiology section of CPT®: reviewed and ordered  Tests in the medicine section of CPT®: ordered and reviewed  Decide to obtain previous medical records or to obtain history from someone other than the patient: yes (I reviewed the patient's [facility] report.  The patient received 60-day supply of her hydromorphone and her pain pump on April 21.  The patient also received a 30-day supply of Xanax on April 11)           Social Determinants of Health:    Patient is independent, reliable, and has access to care.       Disposition and Care Coordination:    Discharged: The patient is suitable and stable for discharge with no need for consideration of admission.    I have explained discharge medications and the need for follow up with the patient/caretakers. This was also printed in the discharge instructions. Patient was discharged with the following medications and follow up:      Medication List      No changes were made to your prescriptions during this visit.      Virgil Salas MD  [address] [facility] [address]  [place] [address]  [phone]    On 5/7/2025         Final diagnoses:   Other chronic pain   Dysphagia, unspecified type   Primary malignant neoplasm of lung metastatic to other site, unspecified laterality        ED Disposition       ED Disposition   Discharge    Condition   Stable    Comment   --               This medical record created using voice recognition software.             Poncho Connor DO  05/07/25 0001

## 2025-05-09 ENCOUNTER — READMISSION MANAGEMENT (OUTPATIENT)
Dept: CALL CENTER | Facility: HOSPITAL | Age: 60
End: 2025-05-09
Payer: COMMERCIAL

## 2025-05-09 LAB
BACTERIA SPEC AEROBE CULT: NORMAL
BACTERIA SPEC AEROBE CULT: NORMAL

## 2025-05-09 NOTE — OUTREACH NOTE
COPD/PN Week 1 Survey      Flowsheet Row Responses   [facility]? [facility]   Does the patient have one of the following disease processes/diagnoses(primary or secondary)? COPD   Week 1 attempt successful? No   Unsuccessful attempts Attempt 1            Erlinda REYES - Licensed Nurse

## 2025-05-12 ENCOUNTER — TELEPHONE (OUTPATIENT)
Dept: RADIATION ONCOLOGY | Facility: HOSPITAL | Age: 60
End: 2025-05-12
Payer: COMMERCIAL

## 2025-05-12 NOTE — TELEPHONE ENCOUNTER
OSW attempted to contact Ms. Llanes's daughter, Sheila, to confirm whether or not she needs help getting her transportation arranged for her upcoming follow up visit in radiation oncology on Friday, 5/16. Provided my direct number where she may reach me back at if needed. OSW support remains available.

## 2025-05-13 ENCOUNTER — TELEPHONE (OUTPATIENT)
Facility: HOSPITAL | Age: 60
End: 2025-05-13
Payer: COMMERCIAL

## 2025-05-13 NOTE — TELEPHONE ENCOUNTER
OSW attempted to reach Ms. Llanes and her daughter again this afternoon and left another VM. OSW support remains available.

## 2025-05-15 ENCOUNTER — HOSPITAL ENCOUNTER (EMERGENCY)
Facility: HOSPITAL | Age: 60
Discharge: HOME | End: 2025-05-15
Attending: EMERGENCY MEDICINE
Payer: COMMERCIAL

## 2025-05-15 ENCOUNTER — APPOINTMENT (OUTPATIENT)
Dept: CT IMAGING | Facility: HOSPITAL | Age: 60
End: 2025-05-15
Payer: COMMERCIAL

## 2025-05-15 VITALS
TEMPERATURE: 98.2 F | OXYGEN SATURATION: 93 % | RESPIRATION RATE: 13 BRPM | WEIGHT: 140.43 LBS | BODY MASS INDEX: 22.57 KG/M2 | DIASTOLIC BLOOD PRESSURE: 70 MMHG | HEART RATE: 110 BPM | SYSTOLIC BLOOD PRESSURE: 101 MMHG | HEIGHT: 66 IN

## 2025-05-15 DIAGNOSIS — L03.116 CELLULITIS OF BOTH LOWER EXTREMITIES: ICD-10-CM

## 2025-05-15 DIAGNOSIS — S00.03XA CONTUSION OF SCALP, INITIAL ENCOUNTER: Primary | ICD-10-CM

## 2025-05-15 DIAGNOSIS — L03.115 CELLULITIS OF BOTH LOWER EXTREMITIES: ICD-10-CM
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
## 2025-05-15 LAB
ALBUMIN SERPL-MCNC: 3.3 G/DL (ref 3.5–5.2)
ALBUMIN/GLOB SERPL: 1.2 G/DL
ALP SERPL-CCNC: 93 U/L (ref 39–117)
ALT SERPL W P-5'-P-CCNC: 16 U/L (ref 1–33)
ANION GAP SERPL CALCULATED.3IONS-SCNC: 10.2 MMOL/L (ref 5–15)
AST SERPL-CCNC: 17 U/L (ref 1–32)
BASOPHILS # BLD AUTO: 0.01 10*3/MM3 (ref 0–0.2)
BASOPHILS NFR BLD AUTO: 0.2 % (ref 0–1.5)
BILIRUB SERPL-MCNC: 0.3 MG/DL (ref 0–1.2)
BILIRUB UR QL STRIP: NEGATIVE
BUN SERPL-MCNC: 11 MG/DL (ref 6–20)
BUN/CREAT SERPL: 28.9 (ref 7–25)
CALCIUM SPEC-SCNC: 8.8 MG/DL (ref 8.6–10.5)
CHLORIDE SERPL-SCNC: 96 MMOL/L (ref 98–107)
CLARITY UR: CLEAR
CO2 SERPL-SCNC: 29.8 MMOL/L (ref 22–29)
COLOR UR: YELLOW
CREAT SERPL-MCNC: 0.38 MG/DL (ref 0.57–1)
DEPRECATED RDW RBC AUTO: 47.8 FL (ref 37–54)
EGFRCR SERPLBLD CKD-EPI 2021: 115.6 ML/MIN/1.73
EOSINOPHIL # BLD AUTO: 0.02 10*3/MM3 (ref 0–0.4)
EOSINOPHIL NFR BLD AUTO: 0.4 % (ref 0.3–6.2)
ERYTHROCYTE [DISTWIDTH] IN BLOOD BY AUTOMATED COUNT: 14.8 % (ref 12.3–15.4)
GLOBULIN UR ELPH-MCNC: 2.7 GM/DL
GLUCOSE BLDC GLUCOMTR-MCNC: 331 MG/DL (ref 70–99)
GLUCOSE SERPL-MCNC: 366 MG/DL (ref 65–99)
GLUCOSE UR STRIP-MCNC: ABNORMAL MG/DL
HCT VFR BLD AUTO: 40.7 % (ref 34–46.6)
HGB BLD-MCNC: 12.1 G/DL (ref 12–15.9)
HGB UR QL STRIP.AUTO: NEGATIVE
HOLD SPECIMEN: NORMAL
HOLD SPECIMEN: NORMAL
IMM GRANULOCYTES # BLD AUTO: 0.09 10*3/MM3 (ref 0–0.05)
IMM GRANULOCYTES NFR BLD AUTO: 1.7 % (ref 0–0.5)
KETONES UR QL STRIP: NEGATIVE
LEUKOCYTE ESTERASE UR QL STRIP.AUTO: NEGATIVE
LYMPHOCYTES # BLD AUTO: 0.63 10*3/MM3 (ref 0.7–3.1)
LYMPHOCYTES NFR BLD AUTO: 11.6 % (ref 19.6–45.3)
MCH RBC QN AUTO: 26.3 PG (ref 26.6–33)
MCHC RBC AUTO-ENTMCNC: 29.7 G/DL (ref 31.5–35.7)
MCV RBC AUTO: 88.5 FL (ref 79–97)
MONOCYTES # BLD AUTO: 0.32 10*3/MM3 (ref 0.1–0.9)
MONOCYTES NFR BLD AUTO: 5.9 % (ref 5–12)
NEUTROPHILS NFR BLD AUTO: 4.35 10*3/MM3 (ref 1.7–7)
NEUTROPHILS NFR BLD AUTO: 80.2 % (ref 42.7–76)
NITRITE UR QL STRIP: NEGATIVE
NRBC BLD AUTO-RTO: 0 /100 WBC (ref 0–0.2)
PH UR STRIP.AUTO: 6 [PH] (ref 5–8)
PLATELET # BLD AUTO: 169 10*3/MM3 (ref 140–450)
PMV BLD AUTO: 10.2 FL (ref 6–12)
POTASSIUM SERPL-SCNC: 4 MMOL/L (ref 3.5–5.2)
PROT SERPL-MCNC: 6 G/DL (ref 6–8.5)
PROT UR QL STRIP: ABNORMAL
RBC # BLD AUTO: 4.6 10*6/MM3 (ref 3.77–5.28)
SODIUM SERPL-SCNC: 136 MMOL/L (ref 136–145)
SP GR UR STRIP: >1.03 (ref 1–1.03)
UROBILINOGEN UR QL STRIP: ABNORMAL
WBC NRBC COR # BLD AUTO: 5.42 10*3/MM3 (ref 3.4–10.8)
WHOLE BLOOD HOLD COAG: NORMAL
WHOLE BLOOD HOLD SPECIMEN: NORMAL

## 2025-05-15 PROCEDURE — P9612 CATHETERIZE FOR URINE SPEC: HCPCS

## 2025-05-15 PROCEDURE — 80053 COMPREHEN METABOLIC PANEL: CPT | Performed by: EMERGENCY MEDICINE

## 2025-05-15 PROCEDURE — 85025 COMPLETE CBC W/AUTO DIFF WBC: CPT

## 2025-05-15 PROCEDURE — 96375 TX/PRO/DX INJ NEW DRUG ADDON: CPT

## 2025-05-15 PROCEDURE — 82948 REAGENT STRIP/BLOOD GLUCOSE: CPT

## 2025-05-15 PROCEDURE — 96376 TX/PRO/DX INJ SAME DRUG ADON: CPT

## 2025-05-15 PROCEDURE — 81003 URINALYSIS AUTO W/O SCOPE: CPT | Performed by: EMERGENCY MEDICINE

## 2025-05-15 PROCEDURE — 70450 CT HEAD/BRAIN W/O DYE: CPT

## 2025-05-15 PROCEDURE — 96374 THER/PROPH/DIAG INJ IV PUSH: CPT

## 2025-05-15 PROCEDURE — 99284 EMERGENCY DEPT VISIT MOD MDM: CPT

## 2025-05-15 RX ORDER — SODIUM CHLORIDE 0.9 % (FLUSH) 0.9 %
10 SYRINGE (ML) INJECTION AS NEEDED
Status: DISCONTINUED | OUTPATIENT
Start: 2025-05-15 | End: 2025-05-15 | Stop reason: HOSPADM

## 2025-05-15 RX ORDER — HYDROCODONE BITARTRATE AND ACETAMINOPHEN 10; 325 MG/1; MG/1
1 TABLET ORAL EVERY 4 HOURS PRN
COMMUNITY
Start: 2025-05-13

## 2025-05-15 RX ORDER — CLINDAMYCIN HYDROCHLORIDE 300 MG/1
300 CAPSULE ORAL 4 TIMES DAILY
Qty: 56 CAPSULE | Refills: 0 | Status: SHIPPED | OUTPATIENT
Start: 2025-05-15 | End: 2025-05-29

## 2025-05-15 RX ORDER — ALBUTEROL SULFATE 90 UG/1
2 INHALANT RESPIRATORY (INHALATION) EVERY 4 HOURS PRN
COMMUNITY
Start: 2025-05-13

## 2025-05-15 NOTE — ED PROVIDER NOTES
Time: 5:32 PM EDT  Date of encounter:  5/15/2025  Independent Historian/Clinical History and Information was obtained by:   Patient and daughter  Chief Complaint: Head injury    History is limited by: N/A    History of Present Illness:  Patient is a 59 y.o. year old female who presents to the emergency department for evaluation of head injury.  Patient presents to the ER for evaluation of possible head injury.  Patient is in her wheelchair.  Patient's wheelchair is missing and arm on 1 side.  Patient was reclined in her wheelchair which is typical.  She had fallen asleep.  Daughter was in the same area.  Daughter states that she was talking to her  and heard a thud and the patient had fallen out of the wheelchair.  She is concerned because she has developed some soft tissue swelling to the back of her head and brings her into the ER for evaluation.  The daughter is also concerned that she has cellulitis of her legs and is requesting an antibiotic.    Patient Care Team  Primary Care Provider: Virgil Salas MD    Past Medical History:     Allergies   Allergen Reactions    Amoxicillin Shortness Of Breath     Has tolerated Cefazolin, Ceftriaxone, and Cefepime -Jermaine Melida, RPH    Ceclor [Cefaclor] Shortness Of Breath     Has tolerated Cefazolin, Ceftriaxone, and Cefepime -Jermaine Melida, RPH      Penicillins Shortness Of Breath     Has tolerated Cefazolin, Ceftriaxone, and Cefepime -Jermaine Melida, RPH    Sulfa Antibiotics Rash    Valium [Diazepam] Mental Status Change     DEPRESSED    Ambien [Zolpidem] Mental Status Change    Aspirin GI Intolerance    Ativan [Lorazepam] Mental Status Change    Benadryl [Diphenhydramine] Anxiety     AND MAKES HER HYPER    Biaxin [Clarithromycin] Unknown - Low Severity    Cephalexin Unknown - Low Severity    Clindamycin Unknown - Low Severity    Compazine [Prochlorperazine Edisylate] Rash    Contrast Dye (Echo Or Unknown Ct/Mr) Other (See Comments)     Caused pain  in her arm    Doxycycline Rash    Nsaids GI Intolerance    Phenergan [Promethazine Hcl] GI Intolerance    Promethazine GI Intolerance    Vancomycin Itching     Past Medical History:   Diagnosis Date    Anesthesia     REPORTS HAS GOT REAL EMOTIONAL AND HAS BECOME ANGRY BEFORE    Anxiety     Aortic stenosis, severe     HAS BIO VALVE REPLACED    Arthritis     Asthma     Back pain     Blood clotting disorder     test to find out what type    Cancer     CHF (congestive heart failure)     Chronic low back pain with sciatica     Chronic pain disorder     COPD (chronic obstructive pulmonary disease)     Coronary artery disease     looking to follow with Cardio    Diabetes mellitus     TYPE 2    Dyspnea on effort     Fatty liver     GERD (gastroesophageal reflux disease)     H/O lumpectomy     H/O: hysterectomy     Hiatal hernia     History of degenerative disc disease     History of kidney stones     History of pulmonary embolus (PE)     History of transfusion     AS A CHILD NO TRANSFUSION REACTION    Hodgkin's lymphoma     5/19/23  5 YEARS SINCE LAST CHEMO TX    Hypertension     Immobility     Lupus     Nausea vomiting and diarrhea 03/07/2024    Panic disorder     Pelvic pain     Peripheral neuropathy     Personal history of DVT (deep vein thrombosis)     over 20 years    PONV (postoperative nausea and vomiting)     Presence of intrathecal pump     PTSD (post-traumatic stress disorder)     Sacroiliac joint disease     SI (sacroiliac) joint inflammation     Venous insufficiency      Past Surgical History:   Procedure Laterality Date    ABDOMINAL SURGERY      BACK SURGERY      SPINAL FUSION     BACK SURGERY      BLADDER REPAIR      BRONCHOSCOPY WITH ION ROBOTIC ASSIST N/A 4/22/2025    Procedure: BRONCHOSCOPY WITH ION ROBOT: REBUS, EBUS, NEEDLE ASPIRATES, BIOPSIES, BRUSHINGS, BAL, WASHINGS: insertion of lighted robot navigated instrument to view inside the lung;  Surgeon: Enzo Segal MD;  Location: [facility] OR;   Service: Robotics - Pulmonary;  Laterality: N/A;    CARDIAC CATHETERIZATION N/A 03/06/2019    Procedure: Valvuloplasty;  Surgeon: Wayne Johnson MD;  Location: [facility] INVASIVE LOCATION;  Service: Cardiology    CARDIAC CATHETERIZATION      CARDIAC CATHETERIZATION Left 05/31/2023    Procedure: Cardiac Catheterization/Vascular Study;  Surgeon: Poncho Reid MD;  Location: [facility] CATH INVASIVE LOCATION;  Service: Cardiovascular;  Laterality: Left;    CARDIAC SURGERY      Mechanical valve    CARDIAC VALVE REPLACEMENT  2021    CHOLECYSTECTOMY      COLONOSCOPY N/A 12/29/2021    Procedure: COLONOSCOPY;  Surgeon: Karine Orlando MD;  Location: [facility] ENDOSCOPY;  Service: Gastroenterology;  Laterality: N/A;  POOR PREP    COLONOSCOPY N/A 04/13/2022    Procedure: COLONOSCOPY WITH POLYPECTOMY;  Surgeon: Karine Orlando MD;  Location: [facility] ENDOSCOPY;  Service: Gastroenterology;  Laterality: N/A;  COLON POLYP, HEMORRHOIDS, POOR PREP    COLONOSCOPY      DIRECT LARYNGOSCOPY, ESOPHAGOSCOPY, BRONCHOSCOPY N/A 05/22/2023    Procedure: DIRECT LARYNGOSCOPY WITH BIOPSIES , ESOPHAGOSCOPY, BRONCHOSCOPY;  Surgeon: Ronnie Mcgarry MD;  Location: [facility] [place] [facility];  Service: ENT;  Laterality: N/A;    ENDOSCOPY N/A 12/29/2021    Procedure: ESOPHAGOGASTRODUODENOSCOPY;  Surgeon: Karine Orlando MD;  Location: [facility] ENDOSCOPY;  Service: Gastroenterology;  Laterality: N/A;  ESOPHAGITIS, GASTRITIS, HIATAL HERNIA    ENDOSCOPY      ENDOSCOPY N/A 04/16/2024    Procedure: ESOPHAGOGASTRODUODENOSCOPY WITH BIOPSIES;  Surgeon: Karine Orlando MD;  Location: [facility] ENDOSCOPY;  Service: Gastroenterology;  Laterality: N/A;  ESOPHAGITIS, HIATAL HERNIA    ENDOSCOPY W/ PEG TUBE PLACEMENT N/A 12/10/2024    Procedure: ESOPHAGOGASTRODUODENOSCOPY WITH PERCUTANEOUS ENDOSCOPIC GASTROSTOMY TUBE INSERTION WITH ANESTHESIA;  Surgeon: Rodger Ortega MD;  Location: [facility] ENDOSCOPY;  Service: Gastroenterology;   Laterality: N/A;  PEG TUBE INSERTION    HYSTERECTOMY      LYMPHADENECTOMY      PAIN PUMP INSERTION/REVISION N/A 10/18/2019    Procedure: PAIN PUMP INSERTION [facility] [employer];  Surgeon: Horace Rios MD;  Location: [facility] OR;  Service: Pain Management    PAIN PUMP INSERTION/REVISION N/A 11/23/2020    Procedure: pain pump removal;  Surgeon: Horace Rios MD;  Location: [facility] MAIN OR;  Service: Pain Management;  Laterality: N/A;    PEG TUBE INSERTION N/A 07/26/2023    Procedure: PERCUTANEOUS ENDOSCOPIC GASTROSTOMY TUBE INSERTION;  Surgeon: Kody Mercer MD;  Location: [facility] ENDOSCOPY;  Service: General;  Laterality: N/A;  SUCCESSFUL PEG TUBE PLACE PLACEMENT    PORTACATH PLACEMENT      IN LEFT CHEST REPORTS THAT IT IS NOT FUNCTIONING    SKIN BIOPSY      TONSILLECTOMY      TUBAL ABDOMINAL LIGATION      TUMOR REMOVAL      vaginal /anal area     Family History   Problem Relation Age of Onset    Heart failure Mother     Anxiety disorder Mother     Prostate cancer Father     Depression Sister     Bipolar disorder Sister     Pancreatic cancer Sister     ADD / ADHD Brother     Thyroid cancer Maternal Grandmother     Pancreatic cancer Paternal Grandmother     ADD / ADHD Grandson     ADD / ADHD Granddaughter     ADD / ADHD Nephew     Malig Hyperthermia Neg Hx        Home Medications:  Prior to Admission medications    Medication Sig Start Date End Date Taking? Authorizing Provider   albuterol sulfate  (90 Base) MCG/ACT inhaler Inhale 2 puffs Every 4 (Four) Hours As Needed for Shortness of Air. 5/13/25  Yes ProviderNette MD   HYDROcodone-acetaminophen (NORCO)  MG per tablet Take 1 tablet by mouth Every 4 (Four) Hours As Needed for Severe Pain. 5/13/25  Yes Nette Jiménez MD   acetaminophen (Tylenol) 325 MG tablet Take 1 tablet by mouth Every 6 (Six) Hours As Needed for Mild Pain, Headache or Fever.    ProviderNette MD   ALPRAZolam (XANAX) 0.25 MG tablet Take 1  tablet by mouth 3 times a day. 11/25/24   Nette Jiménez MD   budesonide (Pulmicort) 0.5 MG/2ML nebulizer solution Take 2 mL by nebulization 2 (Two) Times a Day for 30 days. Rinse mouth out after each use 4/9/25 5/9/25  Xochitl Estrada APRN   famotidine (Pepcid) 40 MG tablet Take 1 tablet by mouth every night at bedtime. 3/27/25   Sobeida Espinosa APRN   furosemide (LASIX) 20 MG tablet Take 1 tablet by mouth Daily.    Nette Jiménez MD   Insulin Lispro, 1 Unit Dial, (HUMALOG) 100 UNIT/ML solution pen-injector Inject 14 Units under the skin into the appropriate area as directed Daily. 2/14/25   Nette Jiménez MD   ipratropium-albuterol (DUO-NEB) 0.5-2.5 mg/3 ml nebulizer Take 3 mL by nebulization 4 (Four) Times a Day As Needed for Wheezing for up to 30 days. 4/9/25 5/9/25  Xochitl Estrada APRN   loperamide (IMODIUM) 2 MG capsule Take 1 capsule by mouth 4 (Four) Times a Day As Needed for Diarrhea (2 with 1st episode of diarrhea and 1 with each additonal episode, max of 8 per day.). 4/21/25   Sobeida Espinosa APRN   metoprolol succinate XL (TOPROL-XL) 25 MG 24 hr tablet Take 1 tablet by mouth every night at bedtime. 4/17/25   Fredis Chris MD   mupirocin (BACTROBAN) 2 % ointment Apply 1 Application topically to the appropriate area as directed 3 (Three) Times a Day. 4/10/24   Katia Wright MD   ondansetron ODT (ZOFRAN-ODT) 8 MG disintegrating tablet Place 1 tablet on the tongue Daily As Needed. 3/27/25   Nette Jiménez MD   pain patient supplied pump by Intrathecal route Continuous. Type of Medication: Hydromorphone 15 mg/ml and Bupivacaine 0.5 mg/ml  [employer] [phone]  Provider:Dr. Boudreaux  Provider number: [phone]  Basal Dose: Hydromorphone 7.518 mg/day Bupivacaine 0.85436 mg/day  Pump capacity: 40ml  ( MAX of Hydromorphone 9.456 mg/ day; Bupivacaine 0.23452 mg /day)  Bolus Dose:Hydromorphone 0.500 mg, Bupivacaine 0.59392 mg  Max activations: 4 per day  Lockout 3  "hours. 1 Bolus per every 3 hours   Last refill-  4/24/2025  Alarm date- 6/23/2025  Pump manufacture:MedManny Escobar MD   PARoxetine (PAXIL) 20 MG tablet Take 1 tablet by mouth Every Morning. 3/27/25   ProviderNette MD   predniSONE (DELTASONE) 5 MG tablet Take 3 tablets by mouth 2 (Two) Times a Day. 15mg in am 15mg in pm 5/5/25   Virgil Navarro MD   Systane 0.4-0.3 % solution ophthalmic solution (artificial tears) Administer 1-2 drops to both eyes 4 (Four) Times a Day As Needed. 1/31/25   ProviderNette MD        Social History:   Social History     Tobacco Use    Smoking status: Every Day     Current packs/day: 2.00     Average packs/day: 2.0 packs/day for 46.4 years (92.7 ttl pk-yrs)     Types: Cigarettes     Start date: 1979     Passive exposure: Current    Smokeless tobacco: Never    Tobacco comments:     Currently smoking 1.5 daily. 04--   Vaping Use    Vaping status: Never Used   Substance Use Topics    Alcohol use: Never    Drug use: Never         Review of Systems:  Review of Systems   Constitutional:  Negative for chills and fever.   HENT:  Negative for congestion, ear pain and sore throat.    Eyes:  Negative for pain.   Respiratory:  Negative for cough, chest tightness and shortness of breath.    Cardiovascular:  Negative for chest pain.   Gastrointestinal:  Negative for abdominal pain, diarrhea, nausea and vomiting.   Genitourinary:  Negative for flank pain and hematuria.   Musculoskeletal:  Negative for joint swelling.   Skin:  Positive for color change and wound. Negative for pallor.   Neurological:  Positive for headaches. Negative for seizures.   All other systems reviewed and are negative.       Physical Exam:  /88   Pulse 100   Temp 98.2 °F (36.8 °C) (Oral)   Resp 16   Ht 167.6 cm (66\")   Wt 63.7 kg (140 lb 6.9 oz)   LMP  (LMP Unknown)   SpO2 95%   BMI 22.67 kg/m²     Physical Exam    Vital signs were reviewed under triage note.  General appearance " - Patient appears well-developed and well-nourished.  Patient is in no acute distress.  Head - Normocephalic.  Patient has some soft tissue swelling to the left posterior parietal scalp.  There is no laceration.  There are some mild tenderness associated.  Pupils - Equal, round, reactive to light.  Extraocular muscles are intact.  Conjunctiva is clear.  Nasal - Normal inspection.  No evidence of trauma or epistaxis.  Tympanic membranes - Gray, intact without erythema or retractions.  Oral mucosa - Pink and moist without lesions or erythema.  Uvula is midline.  Chest wall - Atraumatic.  Chest wall is nontender.  There are no vesicular rashes noted.  Neck - Supple.  Trachea was midline.  There is no palpable lymphadenopathy or thyromegaly.  There are no meningeal signs  Lungs - Clear to auscultation and percussion bilaterally.  Heart - Regular rate and rhythm without any murmurs, clicks, or gallops.  Abdomen - Soft.  Bowel sounds are present.  There is no palpable tenderness.  There is no rebound, guarding, or rigidity.  There are no palpable masses.  There are no pulsatile masses.  Back - Spine is straight and midline.  There is no CVA tenderness.  Extremities - Intact x4 with full range of motion.  Lower extremities have some soft tissue swelling with diffuse erythema.  Patient has some open wounds and scabbed wounds on her lower legs.  The legs are warm to the touch.  There is 2+ palpable edema.  Pulses are intact x4 and equal.  Neurologic - Patient is awake, alert, and oriented x3.  Cranial nerves II through XII are grossly intact.  Motor and sensory functions grossly intact.  Cerebellar function was normal.  Integument - There are no rashes.  There are no petechia or purpura lesions noted.  There are no vesicular lesions noted.           Procedures:  Procedures      Medical Decision Making:      Comorbidities that affect care:    Hodgkin's lymphoma, lupus, peripheral neuropathy, aortic stenosis, diabetes, DVT/PE,  degenerative disc disease, CHF, PTSD, coronary artery disease, GERD, hypertension    External Notes reviewed:    Previous Clinic Note: Office visit with Dr. Bain on 5/9/2025 was reviewed by me.      The following orders were placed and all results were independently analyzed by me:  Orders Placed This Encounter   Procedures    CT Head Without Contrast    [place] Draw    Comprehensive Metabolic Panel    Urinalysis With Microscopic If Indicated (No Culture) - Urine, Clean Catch    CBC Auto Differential    NPO Diet NPO Type: Strict NPO    Undress & Gown    Continuous Pulse Oximetry    Vital Signs    Oxygen Therapy- Nasal Cannula; Titrate 1-6 LPM Per SpO2; 90 - 95%    POC Glucose Q1H    POC Glucose Once    Insert Peripheral IV    CBC & Differential    Green Top (Gel)    Lavender Top    Gold Top - SST    Light Blue Top       Medications Given in the Emergency Department:  Medications   sodium chloride 0.9 % flush 10 mL (has no administration in time range)        ED Course:     The patient was seen and evaluated in the ED by me.  The above history and physical examination was performed as documented.  Diagnostic data was obtained.  Results reviewed.  Findings were discussed with the patient and her daughter.  Head CT was negative for any acute intracranial injury.  As far as the patient's lower leg she does appear to have some cellulitis.  Patient replaced on some empiric antibiotics.  Patient was advised to follow-up with her oncology appointment tomorrow as scheduled.  Patient and daughter are agreeable to this treatment plan.    Labs:    Lab Results (last 24 hours)       Procedure Component Value Units Date/Time    POC Glucose Once [[id number]]  (Abnormal) Collected: 05/15/25 1344    Specimen: Blood Updated: 05/15/25 1345     Glucose 331 mg/dL      Comment: Serial Number: [id number]:  [id number]       CBC & Differential [[id number]]  (Abnormal) Collected: 05/15/25 1345    Specimen: Blood Updated: 05/15/25 1352     Narrative:      The following orders were created for panel order CBC & Differential.  Procedure                               Abnormality         Status                     ---------                               -----------         ------                     CBC Auto Differential[[id number]]        Abnormal            Final result                 Please view results for these tests on the individual orders.    Comprehensive Metabolic Panel [[id number]]  (Abnormal) Collected: 05/15/25 1345    Specimen: Blood Updated: 05/15/25 1417     Glucose 366 mg/dL      BUN 11 mg/dL      Creatinine 0.38 mg/dL      Sodium 136 mmol/L      Potassium 4.0 mmol/L      Chloride 96 mmol/L      CO2 29.8 mmol/L      Calcium 8.8 mg/dL      Total Protein 6.0 g/dL      Albumin 3.3 g/dL      ALT (SGPT) 16 U/L      AST (SGOT) 17 U/L      Alkaline Phosphatase 93 U/L      Total Bilirubin 0.3 mg/dL      Globulin 2.7 gm/dL      A/G Ratio 1.2 g/dL      BUN/Creatinine Ratio 28.9     Anion Gap 10.2 mmol/L      eGFR 115.6 mL/min/1.73     Narrative:      GFR Categories in Chronic Kidney Disease (CKD)              GFR Category          GFR (mL/min/1.73)    Interpretation  G1                    90 or greater        Normal or high (1)  G2                    60-89                Mild decrease (1)  G3a                   45-59                Mild to moderate decrease  G3b                   30-44                Moderate to severe decrease  G4                    15-29                Severe decrease  G5                    14 or less           Kidney failure    (1)In the absence of evidence of kidney disease, neither GFR category G1 or G2 fulfill the criteria for CKD.    eGFR calculation 2021 CKD-EPI creatinine equation, which does not include race as a factor    CBC Auto Differential [[id number]]  (Abnormal) Collected: 05/15/25 1345    Specimen: Blood Updated: 05/15/25 1352     WBC 5.42 10*3/mm3      RBC 4.60 10*6/mm3      Hemoglobin 12.1 g/dL      Hematocrit  40.7 %      MCV 88.5 fL      MCH 26.3 pg      MCHC 29.7 g/dL      RDW 14.8 %      RDW-SD 47.8 fl      MPV 10.2 fL      Platelets 169 10*3/mm3      Neutrophil % 80.2 %      Lymphocyte % 11.6 %      Monocyte % 5.9 %      Eosinophil % 0.4 %      Basophil % 0.2 %      Immature Grans % 1.7 %      Neutrophils, Absolute 4.35 10*3/mm3      Lymphocytes, Absolute 0.63 10*3/mm3      Monocytes, Absolute 0.32 10*3/mm3      Eosinophils, Absolute 0.02 10*3/mm3      Basophils, Absolute 0.01 10*3/mm3      Immature Grans, Absolute 0.09 10*3/mm3      nRBC 0.0 /100 WBC     Urinalysis With Microscopic If Indicated (No Culture) - Straight Cath [[id number]]  (Abnormal) Collected: 05/15/25 1611    Specimen: Urine from Straight Cath Updated: 05/15/25 1620     Color, UA Yellow     Appearance, UA Clear     pH, UA 6.0     Specific Gravity, UA >1.030     Glucose, UA >=1000 mg/dL (3+)     Ketones, UA Negative     Bilirubin, UA Negative     Blood, UA Negative     Protein, UA Trace     Leuk Esterase, UA Negative     Nitrite, UA Negative     Urobilinogen, UA 1.0 E.U./dL    Narrative:      Urine microscopic not indicated.             Imaging:    CT Head Without Contrast  Result Date: 5/15/2025  CT HEAD WO CONTRAST Date of Exam: 5/15/2025 3:26 PM EDT Indication: Fall from wheelchair?head injury. Comparison: CT brain dated 5/4/2025 Technique: Axial CT images were obtained of the head without contrast administration.  Reconstructed coronal and sagittal images were also obtained. Automated exposure control and iterative construction methods were used. Findings: There is no evidence of hemorrhage. There is no mass effect or midline shift. There is no extracerebral collection. Ventricles are normal in size and configuration for patient's stated age. Posterior fossa is within normal limits. Calvarium and skull base appear intact.  Visualized sinuses show no air fluid levels. Visualized orbits are unremarkable. Left parietal scalp hematoma     Impression:  1.No acute intracranial abnormality identified. Electronically Signed: Drake Zhao MD  5/15/2025 3:42 PM EDT  Workstation ID: [id number]        Differential Diagnosis and Discussion:    Trauma:  Differential diagnosis considered but not limited to were subarachnoid hemorrhage, intracranial bleeding, pneumothorax, cardiac contusion, lung contusion, intra-abdominal bleeding, and compartment syndrome of any extremity or other significant traumatic pathology    Labs were collected in the emergency department and all labs were reviewed and interpreted by me.  CT scan was performed in the emergency department and the CT scan radiology impression was interpreted by me.    MDM     Amount and/or Complexity of Data Reviewed  Clinical lab tests: reviewed  Tests in the radiology section of CPT®: reviewed             Patient Care Considerations:    NARCOTICS: I considered prescribing opiate pain medication as an outpatient, however there were no findings to warrant opioid pain analgesia      Consultants/Shared Management Plan:    None    Social Determinants of Health:    Patient has presented with family members who are responsible, reliable and will ensure follow up care.      Disposition and Care Coordination:    Discharged: I considered escalation of care by admitting this patient to the hospital, however there were no acute traumatic injuries to warrant inpatient admission.    I have explained the patient´s condition, diagnoses and treatment plan based on the information available to me at this time. I have answered questions and addressed any concerns. The patient has a good  understanding of the patient´s diagnosis, condition, and treatment plan as can be expected at this point. The vital signs have been stable. The patient´s condition is stable and appropriate for discharge from the emergency department.      The patient will pursue further outpatient evaluation with the primary care physician or other designated or  consulting physician as outlined in the discharge instructions. They are agreeable to this plan of care and follow-up instructions have been explained in detail. The patient has received these instructions in written format and has expressed an understanding of the discharge instructions. The patient is aware that any significant change in condition or worsening of symptoms should prompt an immediate return to this or the closest emergency department or call to 911.    Final diagnoses:   Contusion of scalp, initial encounter   Fall from wheelchair, initial encounter   Cellulitis of both lower extremities        ED Disposition       ED Disposition   Discharge    Condition   Stable    Comment   --               This medical record created using voice recognition software.             Robbie Langston DO  05/17/25 1737

## 2025-05-15 NOTE — Clinical Note
[facility] EMERGENCY ROOM  [address] [place] [address]  Phone: [phone]  Fax: [phone]    Isha Llanes was seen and treated in our emergency department on 5/15/2025.  She may return to work on 05/15/2025.         Thank you for choosing [facility].    Simran Claros RN

## 2025-05-15 NOTE — DISCHARGE INSTRUCTIONS
Resume normal home activities.  Continue with your current home medical management.  Take the antibiotics as prescribed.  Follow-up with your oncology appointment tomorrow.  Follow with your primary care provider in 7 to 10 days.  Return to the ER for fever greater than 101, increased redness or swelling of the legs, or any other concerns issues that may arise.

## 2025-05-18 LAB
QT INTERVAL: 325 MS
QTC INTERVAL: 465 MS

## 2025-05-20 ENCOUNTER — TELEPHONE (OUTPATIENT)
Dept: WOUND CARE | Facility: HOSPITAL | Age: 60
End: 2025-05-20
Payer: COMMERCIAL

## 2025-05-20 LAB
FUNGUS WND CULT: ABNORMAL
FUNGUS WND CULT: NORMAL
MYCOBACTERIUM SPEC CULT: NORMAL
MYCOBACTERIUM SPEC CULT: NORMAL
NIGHT BLUE STAIN TISS: NORMAL

## 2025-05-20 NOTE — TELEPHONE ENCOUNTER
Miguelito from [facility] called to report on this patient. She said the patients right leg is weeping badly and red. She said the dressing is soaking within an hour. I advised Miguelito that we have not seen patient since 02/2025 due to cancellations, and the patient needs to make a follow-up to be seen, however due to the amount of drainage and redness she is having that we suggest that she go to the ER now to be evaluated. Miguelito stated she had advised the patient of the same thing, but she will let her know that we are  suggesting she go to the ER as well.

## 2025-05-21 ENCOUNTER — READMISSION MANAGEMENT (OUTPATIENT)
Dept: CALL CENTER | Facility: HOSPITAL | Age: 60
End: 2025-05-21
Payer: COMMERCIAL

## 2025-05-21 NOTE — OUTREACH NOTE
COPD/PN Week 2 Survey      Flowsheet Row Responses   [facility] patient discharged from? [facility]   Does the patient have one of the following disease processes/diagnoses(primary or secondary)? COPD   Week 2 attempt successful? No   Unsuccessful attempts Attempt 1            Monet ROBERTSON - Registered Nurse

## 2025-05-27 ENCOUNTER — HOSPITAL ENCOUNTER (EMERGENCY)
Facility: HOSPITAL | Age: 60
Discharge: HOME | End: 2025-05-28
Attending: EMERGENCY MEDICINE
Payer: COMMERCIAL

## 2025-05-27 ENCOUNTER — APPOINTMENT (OUTPATIENT)
Dept: GENERAL RADIOLOGY | Facility: HOSPITAL | Age: 60
End: 2025-05-27
Payer: COMMERCIAL

## 2025-05-27 DIAGNOSIS — L03.115 CELLULITIS OF RIGHT LOWER EXTREMITY: Primary | ICD-10-CM

## 2025-05-27 DIAGNOSIS — T14.8XXA CHRONIC WOUND: ICD-10-CM

## 2025-05-27 LAB
BASOPHILS # BLD AUTO: 0.02 10*3/MM3 (ref 0–0.2)
BASOPHILS NFR BLD AUTO: 0.2 % (ref 0–1.5)
BILIRUB UR QL STRIP: NEGATIVE
CLARITY UR: CLEAR
COLOR UR: YELLOW
D-LACTATE SERPL-SCNC: 1.8 MMOL/L (ref 0.5–2)
DEPRECATED RDW RBC AUTO: 45.2 FL (ref 37–54)
EOSINOPHIL # BLD AUTO: 0 10*3/MM3 (ref 0–0.4)
EOSINOPHIL NFR BLD AUTO: 0 % (ref 0.3–6.2)
ERYTHROCYTE [DISTWIDTH] IN BLOOD BY AUTOMATED COUNT: 14.7 % (ref 12.3–15.4)
GLUCOSE UR STRIP-MCNC: ABNORMAL MG/DL
HCT VFR BLD AUTO: 40.6 % (ref 34–46.6)
HGB BLD-MCNC: 12.3 G/DL (ref 12–15.9)
HGB UR QL STRIP.AUTO: NEGATIVE
IMM GRANULOCYTES # BLD AUTO: 0.07 10*3/MM3 (ref 0–0.05)
IMM GRANULOCYTES NFR BLD AUTO: 0.8 % (ref 0–0.5)
KETONES UR QL STRIP: NEGATIVE
LEUKOCYTE ESTERASE UR QL STRIP.AUTO: NEGATIVE
LYMPHOCYTES # BLD AUTO: 0.46 10*3/MM3 (ref 0.7–3.1)
LYMPHOCYTES NFR BLD AUTO: 5.6 % (ref 19.6–45.3)
MCH RBC QN AUTO: 25.6 PG (ref 26.6–33)
MCHC RBC AUTO-ENTMCNC: 30.3 G/DL (ref 31.5–35.7)
MCV RBC AUTO: 84.4 FL (ref 79–97)
MONOCYTES # BLD AUTO: 0.28 10*3/MM3 (ref 0.1–0.9)
MONOCYTES NFR BLD AUTO: 3.4 % (ref 5–12)
MYCOBACTERIUM SPEC CULT: NORMAL
MYCOBACTERIUM SPEC CULT: NORMAL
NEUTROPHILS NFR BLD AUTO: 7.45 10*3/MM3 (ref 1.7–7)
NEUTROPHILS NFR BLD AUTO: 90 % (ref 42.7–76)
NIGHT BLUE STAIN TISS: NORMAL
NITRITE UR QL STRIP: NEGATIVE
NRBC BLD AUTO-RTO: 0 /100 WBC (ref 0–0.2)
PH UR STRIP.AUTO: 7 [PH] (ref 5–8)
PLATELET # BLD AUTO: 215 10*3/MM3 (ref 140–450)
PMV BLD AUTO: 9.6 FL (ref 6–12)
PROT UR QL STRIP: NEGATIVE
RBC # BLD AUTO: 4.81 10*6/MM3 (ref 3.77–5.28)
SP GR UR STRIP: >1.03 (ref 1–1.03)
UROBILINOGEN UR QL STRIP: ABNORMAL
WBC NRBC COR # BLD AUTO: 8.28 10*3/MM3 (ref 3.4–10.8)

## 2025-05-27 PROCEDURE — 36415 COLL VENOUS BLD VENIPUNCTURE: CPT

## 2025-05-27 PROCEDURE — 87040 BLOOD CULTURE FOR BACTERIA: CPT | Performed by: EMERGENCY MEDICINE

## 2025-05-27 PROCEDURE — 99283 EMERGENCY DEPT VISIT LOW MDM: CPT

## 2025-05-27 PROCEDURE — 81003 URINALYSIS AUTO W/O SCOPE: CPT | Performed by: EMERGENCY MEDICINE

## 2025-05-27 PROCEDURE — 85025 COMPLETE CBC W/AUTO DIFF WBC: CPT | Performed by: EMERGENCY MEDICINE

## 2025-05-27 PROCEDURE — 80053 COMPREHEN METABOLIC PANEL: CPT | Performed by: EMERGENCY MEDICINE

## 2025-05-27 PROCEDURE — 83605 ASSAY OF LACTIC ACID: CPT | Performed by: EMERGENCY MEDICINE

## 2025-05-27 PROCEDURE — 71045 X-RAY EXAM CHEST 1 VIEW: CPT

## 2025-05-27 RX ORDER — LEVOFLOXACIN 5 MG/ML
750 INJECTION, SOLUTION INTRAVENOUS ONCE
Status: COMPLETED | OUTPATIENT
Start: 2025-05-28 | End: 2025-05-28

## 2025-05-28 VITALS
RESPIRATION RATE: 17 BRPM | OXYGEN SATURATION: 92 % | WEIGHT: 124.78 LBS | HEIGHT: 66 IN | DIASTOLIC BLOOD PRESSURE: 97 MMHG | HEART RATE: 101 BPM | TEMPERATURE: 97.7 F | BODY MASS INDEX: 20.05 KG/M2 | SYSTOLIC BLOOD PRESSURE: 118 MMHG

## 2025-05-28 LAB
ALBUMIN SERPL-MCNC: 3.7 G/DL (ref 3.5–5.2)
ALBUMIN/GLOB SERPL: 1.1 G/DL
ALP SERPL-CCNC: 140 U/L (ref 39–117)
ALT SERPL W P-5'-P-CCNC: 12 U/L (ref 1–33)
ANION GAP SERPL CALCULATED.3IONS-SCNC: 9 MMOL/L (ref 5–15)
ARTERIAL PATENCY WRIST A: POSITIVE
AST SERPL-CCNC: 10 U/L (ref 1–32)
ATMOSPHERIC PRESS: 742.9 MMHG
BASE EXCESS BLDA CALC-SCNC: 6.2 MMOL/L (ref -2–2)
BDY SITE: ABNORMAL
BILIRUB SERPL-MCNC: 0.2 MG/DL (ref 0–1.2)
BUN SERPL-MCNC: 12.5 MG/DL (ref 6–20)
BUN/CREAT SERPL: 33.8 (ref 7–25)
CA-I BLDA-SCNC: 1.16 MMOL/L (ref 1.13–1.32)
CALCIUM SPEC-SCNC: 9.8 MG/DL (ref 8.6–10.5)
CHLORIDE BLDA-SCNC: 95 MMOL/L (ref 98–107)
CHLORIDE SERPL-SCNC: 94 MMOL/L (ref 98–107)
CO2 SERPL-SCNC: 32 MMOL/L (ref 22–29)
CREAT SERPL-MCNC: 0.37 MG/DL (ref 0.57–1)
D-LACTATE SERPL-SCNC: 0.9 MMOL/L
EGFRCR SERPLBLD CKD-EPI 2021: 116.3 ML/MIN/1.73
GLOBULIN UR ELPH-MCNC: 3.3 GM/DL
GLUCOSE BLDC GLUCOMTR-MCNC: 294 MG/DL (ref 70–99)
GLUCOSE BLDC GLUCOMTR-MCNC: 436 MG/DL (ref 65–99)
GLUCOSE SERPL-MCNC: 445 MG/DL (ref 65–99)
HCO3 BLDA-SCNC: 32.1 MMOL/L (ref 22–26)
HCT VFR BLD CALC: 36 % (ref 38–51)
HEMODILUTION: NO
HGB BLDA-MCNC: 12.3 G/DL (ref 12–18)
MODALITY: ABNORMAL
PCO2 BLDA: 51.1 MM HG (ref 35–45)
PH BLDA: 7.41 PH UNITS (ref 7.35–7.45)
PO2 BLDA: 60.1 MM HG (ref 80–100)
POTASSIUM BLDA-SCNC: 4.4 MMOL/L (ref 3.5–5)
POTASSIUM SERPL-SCNC: 4.8 MMOL/L (ref 3.5–5.2)
PROT SERPL-MCNC: 7 G/DL (ref 6–8.5)
SAO2 % BLDCOA: 90.3 % (ref 95–99)
SODIUM BLD-SCNC: 139 MMOL/L (ref 131–143)
SODIUM SERPL-SCNC: 135 MMOL/L (ref 136–145)

## 2025-05-28 PROCEDURE — 82330 ASSAY OF CALCIUM: CPT

## 2025-05-28 PROCEDURE — 80051 ELECTROLYTE PANEL: CPT

## 2025-05-28 PROCEDURE — 25010000002 LEVOFLOXACIN PER 250 MG: Performed by: EMERGENCY MEDICINE

## 2025-05-28 PROCEDURE — 25810000003 SODIUM CHLORIDE 0.9 % SOLUTION: Performed by: EMERGENCY MEDICINE

## 2025-05-28 PROCEDURE — 96365 THER/PROPH/DIAG IV INF INIT: CPT

## 2025-05-28 PROCEDURE — 83605 ASSAY OF LACTIC ACID: CPT

## 2025-05-28 PROCEDURE — 63710000001 INSULIN REGULAR HUMAN PER 5 UNITS: Performed by: EMERGENCY MEDICINE

## 2025-05-28 PROCEDURE — 82948 REAGENT STRIP/BLOOD GLUCOSE: CPT | Performed by: EMERGENCY MEDICINE

## 2025-05-28 PROCEDURE — 82948 REAGENT STRIP/BLOOD GLUCOSE: CPT

## 2025-05-28 PROCEDURE — 82803 BLOOD GASES ANY COMBINATION: CPT

## 2025-05-28 PROCEDURE — 36600 WITHDRAWAL OF ARTERIAL BLOOD: CPT

## 2025-05-28 RX ORDER — LEVOFLOXACIN 750 MG/1
750 TABLET, FILM COATED ORAL DAILY
Qty: 5 TABLET | Refills: 0 | Status: SHIPPED | OUTPATIENT
Start: 2025-05-28 | End: 2025-06-02

## 2025-05-28 RX ADMIN — INSULIN HUMAN 7 UNITS: 100 INJECTION, SOLUTION PARENTERAL at 01:00

## 2025-05-28 RX ADMIN — LEVOFLOXACIN 750 MG: 750 INJECTION, SOLUTION INTRAVENOUS at 01:02

## 2025-05-28 RX ADMIN — SODIUM CHLORIDE 1000 ML: 9 INJECTION, SOLUTION INTRAVENOUS at 01:02

## 2025-05-28 NOTE — ED PROVIDER NOTES
Time: 10:06 PM EDT  Date of encounter:  5/27/2025  Independent Historian/Clinical History and Information was obtained by:   Patient    History is limited by: N/A    Chief Complaint: wound infection      History of Present Illness:  Patient is a 59 y.o. year old female who presents to the emergency department for evaluation of Possible wound infection.  Patient has chronic history of wounds of bilateral lower extremity.  She reports an increase in erythema to right lower extremity.  She states she has been diagnosed with 2 types of cancers.  She has not officially started treatment yet.  She states she still in process of being worked up.  She has been followed by wound care clinic but she states they did not do anything for her.  She denies any chest pain or shortness of breath.  She reports some subjective fevers at night.  She currently lives with her daughter.      Patient Care Team  Primary Care Provider: Virgil Salas MD    Past Medical History:     Allergies   Allergen Reactions    Amoxicillin Shortness Of Breath     Has tolerated Cefazolin, Ceftriaxone, and Cefepime -[facility], [facility]    Ceclor [Cefaclor] Shortness Of Breath     Has tolerated [facility], [facility], and [facility]      Penicillins Shortness Of Breath     Has tolerated Cefazolin, Ceftriaxone, and Cefepime [facility]    Sulfa Antibiotics Rash    Valium [Diazepam] Mental Status Change     DEPRESSED    Ambien [Zolpidem] Mental Status Change    Aspirin GI Intolerance    Ativan [Lorazepam] Mental Status Change    Benadryl [Diphenhydramine] Anxiety     AND MAKES HER HYPER    Biaxin [Clarithromycin] Unknown - Low Severity    Cephalexin Unknown - Low Severity    Clindamycin Unknown - Low Severity    Compazine [Prochlorperazine Edisylate] Rash    Contrast Dye (Echo Or Unknown Ct/Mr) Other (See Comments)     Caused pain in her arm    Doxycycline Rash    Nsaids GI Intolerance    Phenergan [Promethazine Hcl] GI  Intolerance    Promethazine GI Intolerance    Vancomycin Itching     Past Medical History:   Diagnosis Date    Anesthesia     REPORTS HAS GOT REAL EMOTIONAL AND HAS BECOME ANGRY BEFORE    Anxiety     Aortic stenosis, severe     HAS BIO VALVE REPLACED    Arthritis     Asthma     Back pain     Blood clotting disorder     test to find out what type    Cancer     CHF (congestive heart failure)     Chronic low back pain with sciatica     Chronic pain disorder     COPD (chronic obstructive pulmonary disease)     Coronary artery disease     looking to follow with Cardio    Diabetes mellitus     TYPE 2    Dyspnea on effort     Fatty liver     GERD (gastroesophageal reflux disease)     H/O lumpectomy     H/O: hysterectomy     Hiatal hernia     History of degenerative disc disease     History of kidney stones     History of pulmonary embolus (PE)     History of transfusion     AS A CHILD NO TRANSFUSION REACTION    Hodgkin's lymphoma     5/19/23  5 YEARS SINCE LAST CHEMO TX    Hypertension     Immobility     Lupus     Nausea vomiting and diarrhea 03/07/2024    Panic disorder     Pelvic pain     Peripheral neuropathy     Personal history of DVT (deep vein thrombosis)     over 20 years    PONV (postoperative nausea and vomiting)     Presence of intrathecal pump     PTSD (post-traumatic stress disorder)     Sacroiliac joint disease     SI (sacroiliac) joint inflammation     Venous insufficiency      Past Surgical History:   Procedure Laterality Date    ABDOMINAL SURGERY      BACK SURGERY      SPINAL FUSION     BACK SURGERY      BLADDER REPAIR      BRONCHOSCOPY WITH ION ROBOTIC ASSIST N/A 4/22/2025    Procedure: BRONCHOSCOPY WITH ION ROBOT: REBUS, EBUS, NEEDLE ASPIRATES, BIOPSIES, BRUSHINGS, BAL, WASHINGS: insertion of lighted robot navigated instrument to view inside the lung;  Surgeon: Enzo Segal MD;  Location: [facility];  Service: Robotics - Pulmonary;  Laterality: N/A;    CARDIAC CATHETERIZATION N/A 03/06/2019     Procedure: Valvuloplasty;  Surgeon: Wayne Johnson MD;  Location: [facility] INVASIVE LOCATION;  Service: Cardiology    CARDIAC CATHETERIZATION      CARDIAC CATHETERIZATION Left 05/31/2023    Procedure: Cardiac Catheterization/Vascular Study;  Surgeon: Poncho Reid MD;  Location: [facility] CATH INVASIVE LOCATION;  Service: Cardiovascular;  Laterality: Left;    CARDIAC SURGERY      Mechanical valve    CARDIAC VALVE REPLACEMENT  2021    CHOLECYSTECTOMY      COLONOSCOPY N/A 12/29/2021    Procedure: COLONOSCOPY;  Surgeon: Karine Orlando MD;  Location: [facility] ENDOSCOPY;  Service: Gastroenterology;  Laterality: N/A;  POOR PREP    COLONOSCOPY N/A 04/13/2022    Procedure: COLONOSCOPY WITH POLYPECTOMY;  Surgeon: Karine Orlando MD;  Location: [facility] ENDOSCOPY;  Service: Gastroenterology;  Laterality: N/A;  COLON POLYP, HEMORRHOIDS, POOR PREP    COLONOSCOPY      DIRECT LARYNGOSCOPY, ESOPHAGOSCOPY, BRONCHOSCOPY N/A 05/22/2023    Procedure: DIRECT LARYNGOSCOPY WITH BIOPSIES , ESOPHAGOSCOPY, BRONCHOSCOPY;  Surgeon: Ronnie Mcgarry MD;  Location: [facility] OR [facility];  Service: ENT;  Laterality: N/A;    ENDOSCOPY N/A 12/29/2021    Procedure: ESOPHAGOGASTRODUODENOSCOPY;  Surgeon: Karine Orlando MD;  Location: [facility] ENDOSCOPY;  Service: Gastroenterology;  Laterality: N/A;  ESOPHAGITIS, GASTRITIS, HIATAL HERNIA    ENDOSCOPY      ENDOSCOPY N/A 04/16/2024    Procedure: ESOPHAGOGASTRODUODENOSCOPY WITH BIOPSIES;  Surgeon: Karine Orlando MD;  Location: [facility] ENDOSCOPY;  Service: Gastroenterology;  Laterality: N/A;  ESOPHAGITIS, HIATAL HERNIA    ENDOSCOPY W/ PEG TUBE PLACEMENT N/A 12/10/2024    Procedure: ESOPHAGOGASTRODUODENOSCOPY WITH PERCUTANEOUS ENDOSCOPIC GASTROSTOMY TUBE INSERTION WITH ANESTHESIA;  Surgeon: Rodger Ortega MD;  Location: [facility] ENDOSCOPY;  Service: Gastroenterology;  Laterality: N/A;  PEG TUBE INSERTION    HYSTERECTOMY      LYMPHADENECTOMY      PAIN PUMP  INSERTION/REVISION N/A 10/18/2019    Procedure: PAIN PUMP INSERTION [facility] 10-18-19 PEDRO;  Surgeon: Horace Rios MD;  Location: [facility] MAIN OR;  Service: Pain Management    PAIN PUMP INSERTION/REVISION N/A 11/23/2020    Procedure: pain pump removal;  Surgeon: Horace Rios MD;  Location: [facility] MAIN OR;  Service: Pain Management;  Laterality: N/A;    PEG TUBE INSERTION N/A 07/26/2023    Procedure: PERCUTANEOUS ENDOSCOPIC GASTROSTOMY TUBE INSERTION;  Surgeon: Kody Mercer MD;  Location:  [facility];  Service: General;  Laterality: N/A;  SUCCESSFUL PEG TUBE PLACE PLACEMENT    PORTACATH PLACEMENT      IN LEFT CHEST REPORTS THAT IT IS NOT FUNCTIONING    SKIN BIOPSY      TONSILLECTOMY      TUBAL ABDOMINAL LIGATION      TUMOR REMOVAL      vaginal /anal area     Family History   Problem Relation Age of Onset    Heart failure Mother     Anxiety disorder Mother     Prostate cancer Father     Depression Sister     Bipolar disorder Sister     Pancreatic cancer Sister     ADD / ADHD Brother     Thyroid cancer Maternal Grandmother     Pancreatic cancer Paternal Grandmother     ADD / ADHD Grandson     ADD / ADHD Granddaughter     ADD / ADHD Nephew     Malig Hyperthermia Neg Hx        Home Medications:  Prior to Admission medications    Medication Sig Start Date End Date Taking? Authorizing Provider   acetaminophen (Tylenol) 325 MG tablet Take 1 tablet by mouth Every 6 (Six) Hours As Needed for Mild Pain, Headache or Fever.    Nette Jiménez MD   albuterol sulfate  (90 Base) MCG/ACT inhaler Inhale 2 puffs Every 4 (Four) Hours As Needed for Shortness of Air. 5/13/25   Nette Jiménez MD   ALPRAZolam (XANAX) 0.25 MG tablet Take 1 tablet by mouth 3 times a day. 11/25/24   Nette Jiménez MD   budesonide (Pulmicort) 0.5 MG/2ML nebulizer solution Take 2 mL by nebulization 2 (Two) Times a Day for 30 days. Rinse mouth out after each use 4/9/25 5/9/25  Xochitl Estrada, LUZ    clindamycin (CLEOCIN) 300 MG capsule Take 1 capsule by mouth 4 (Four) Times a Day for 14 days. 5/15/25 5/29/25  Robbie Langston DO   famotidine (Pepcid) 40 MG tablet Take 1 tablet by mouth every night at bedtime. 3/27/25   Sobeida Espinosa APRN   furosemide (LASIX) 20 MG tablet Take 1 tablet by mouth Daily.    ProviderNette MD   HYDROcodone-acetaminophen (NORCO)  MG per tablet Take 1 tablet by mouth Every 4 (Four) Hours As Needed for Severe Pain. 5/13/25   Nette Jiménez MD   Insulin Lispro, 1 Unit Dial, (HUMALOG) 100 UNIT/ML solution pen-injector Inject 14 Units under the skin into the appropriate area as directed Daily. 2/14/25   Nette Jiménez MD   ipratropium-albuterol (DUO-NEB) 0.5-2.5 mg/3 ml nebulizer Take 3 mL by nebulization 4 (Four) Times a Day As Needed for Wheezing for up to 30 days. 4/9/25 5/9/25  Xochitl Estrada APRN   loperamide (IMODIUM) 2 MG capsule Take 1 capsule by mouth 4 (Four) Times a Day As Needed for Diarrhea (2 with 1st episode of diarrhea and 1 with each additonal episode, max of 8 per day.). 4/21/25   Sobeida Espinosa APRN   metoprolol succinate XL (TOPROL-XL) 25 MG 24 hr tablet Take 1 tablet by mouth every night at bedtime. 4/17/25   Fredis Chris MD   mupirocin (BACTROBAN) 2 % ointment Apply 1 Application topically to the appropriate area as directed 3 (Three) Times a Day. 4/10/24   Katia Wright MD   ondansetron ODT (ZOFRAN-ODT) 8 MG disintegrating tablet Place 1 tablet on the tongue Daily As Needed. 3/27/25   Nette Jiménez MD   pain patient supplied pump by Intrathecal route Continuous. Type of Medication: Hydromorphone 15 mg/ml and Bupivacaine 0.5 mg/ml  [employer] [phone]  Provider:Dr. Boudreaux  Provider number: [phone]  Basal Dose: Hydromorphone 7.518 mg/day Bupivacaine 0.90584 mg/day  Pump capacity: 40ml  ( MAX of Hydromorphone 9.456 mg/ day; Bupivacaine 0.39516 mg /day)  Bolus Dose:Hydromorphone 0.500 mg, Bupivacaine 0.92396  "mg  Max activations: 4 per day  Lockout 3 hours. 1 Bolus per every 3 hours   Last refill-  4/24/2025  Alarm date- 6/23/2025  Pump manufacture:MedManny Escobar MD   PARoxetine (PAXIL) 20 MG tablet Take 1 tablet by mouth Every Morning. 3/27/25   ProviderNette MD   predniSONE (DELTASONE) 5 MG tablet Take 3 tablets by mouth 2 (Two) Times a Day. 15mg in am 15mg in pm 5/5/25   Virgil Navarro MD   Systane 0.4-0.3 % solution ophthalmic solution (artificial tears) Administer 1-2 drops to both eyes 4 (Four) Times a Day As Needed. 1/31/25   ProviderNette MD        Social History:   Social History     Tobacco Use    Smoking status: Every Day     Current packs/day: 2.00     Average packs/day: 2.0 packs/day for 46.4 years (92.8 ttl pk-yrs)     Types: Cigarettes     Start date: 1979     Passive exposure: Current    Smokeless tobacco: Never    Tobacco comments:     Currently smoking 1.5 daily. 04--   Vaping Use    Vaping status: Never Used   Substance Use Topics    Alcohol use: Never    Drug use: Never         Review of Systems:  Review of Systems   Constitutional:  Positive for chills and fever.   HENT:  Negative for congestion, ear pain and sore throat.    Eyes:  Negative for pain.   Respiratory:  Negative for cough, chest tightness and shortness of breath.    Cardiovascular:  Negative for chest pain.   Gastrointestinal:  Negative for abdominal pain, diarrhea, nausea and vomiting.   Genitourinary:  Negative for flank pain and hematuria.   Musculoskeletal:  Negative for joint swelling.   Skin:  Positive for wound. Negative for pallor.   Neurological:  Negative for seizures and headaches.   All other systems reviewed and are negative.       Physical Exam:  /69   Pulse 102   Temp 97.7 °F (36.5 °C)   Resp 17   Ht 167.6 cm (66\")   Wt 56.6 kg (124 lb 12.5 oz)   LMP  (LMP Unknown)   SpO2 90%   BMI 20.14 kg/m²     Physical Exam  HENT:      Head: Normocephalic.      Right Ear: External " ear normal.      Left Ear: External ear normal.      Mouth/Throat:      Mouth: Mucous membranes are moist.   Eyes:      Extraocular Movements: Extraocular movements intact.      Conjunctiva/sclera: Conjunctivae normal.   Cardiovascular:      Rate and Rhythm: Normal rate.   Pulmonary:      Effort: Pulmonary effort is normal.      Breath sounds: Normal breath sounds.   Abdominal:      General: Abdomen is flat. There is no distension.      Tenderness: There is no abdominal tenderness.      Comments: G-tube in place with no surrounding erythema   Musculoskeletal:      Comments: Multiple ulcerative lesions to bilateral lower extremity.  Erythema to right lower extremity with warmth.   Skin:     General: Skin is warm.   Neurological:      General: No focal deficit present.      Mental Status: She is alert. Mental status is at baseline.                    Medical Decision Making:      Comorbidities that affect care:    Asthma, Cancer, Congestive Heart Failure, COPD, Coronary Artery Disease, Diabetes    External Notes reviewed:    Previous Clinic Note: Patient was seen by her PCP on 4/25/2025 for squamous cell carcinoma of the lung, COPD, chronic respiratory failure, squamous cell carcinoma of larynx, malignant neoplasm of supraglottis, MVP, transcatheter aortic valve replacement, tobacco use      The following orders were placed and all results were independently analyzed by me:  Orders Placed This Encounter   Procedures    Blood Culture - Blood,    Blood Culture - Blood,    XR Chest 1 View    Comprehensive Metabolic Panel    Urinalysis With Culture If Indicated - Urine, Clean Catch    Arterial Blood Gas,H&H,Lytes,Lactate    Lactic Acid, Plasma    CBC Auto Differential    Blood Gas, Arterial -    Ambulatory Referral to Vascular Surgery    POC Glucose Once    POC Lactate    POC Electrolyte Panel    POC Glucose STAT    CBC & Differential       Medications Given in the Emergency Department:  Medications   levoFLOXacin  (LEVAQUIN) 750 mg/150 mL D5W (premix) (LEVAQUIN) 750 mg (750 mg Intravenous New Bag 5/28/25 0102)   sodium chloride 0.9 % bolus 1,000 mL (1,000 mL Intravenous New Bag 5/28/25 0102)   insulin regular (humuLIN R,novoLIN R) injection 7 Units (7 Units Intravenous Given 5/28/25 0100)        ED Course:         Labs:    Lab Results (last 24 hours)       Procedure Component Value Units Date/Time    Blood Culture - Blood, Arm, Right [[id number]] Collected: 05/27/25 2249    Specimen: Blood from Arm, Right Updated: 05/27/25 2330    Blood Culture - Blood, Arm, Left [[id number]] Collected: 05/27/25 2249    Specimen: Blood from Arm, Left Updated: 05/27/25 2330    Urinalysis With Culture If Indicated - Urine, Clean Catch [[id number]]  (Abnormal) Collected: 05/27/25 2259    Specimen: Urine, Clean Catch Updated: 05/27/25 2315     Color, UA Yellow     Appearance, UA Clear     pH, UA 7.0     Specific Gravity, UA >1.030     Glucose, UA >=1000 mg/dL (3+)     Ketones, UA Negative     Bilirubin, UA Negative     Blood, UA Negative     Protein, UA Negative     Leuk Esterase, UA Negative     Nitrite, UA Negative     Urobilinogen, UA 0.2 E.U./dL    Narrative:      In absence of clinical symptoms, the presence of pyuria, bacteria, and/or nitrites on the urinalysis result does not correlate with infection.  Urine microscopic not indicated.    CBC & Differential [[id number]]  (Abnormal) Collected: 05/27/25 2322    Specimen: Blood Updated: 05/27/25 2332    Narrative:      The following orders were created for panel order CBC & Differential.  Procedure                               Abnormality         Status                     ---------                               -----------         ------                     CBC Auto Differential[[id number]]        Abnormal            Final result                 Please view results for these tests on the individual orders.    Comprehensive Metabolic Panel [[id number]]  (Abnormal) Collected: 05/27/25 2322     Specimen: Blood Updated: 05/28/25 0010     Glucose 445 mg/dL      BUN 12.5 mg/dL      Creatinine 0.37 mg/dL      Sodium 135 mmol/L      Potassium 4.8 mmol/L      Chloride 94 mmol/L      CO2 32.0 mmol/L      Calcium 9.8 mg/dL      Total Protein 7.0 g/dL      Albumin 3.7 g/dL      ALT (SGPT) 12 U/L      AST (SGOT) 10 U/L      Alkaline Phosphatase 140 U/L      Total Bilirubin 0.2 mg/dL      Globulin 3.3 gm/dL      A/G Ratio 1.1 g/dL      BUN/Creatinine Ratio 33.8     Anion Gap 9.0 mmol/L      eGFR 116.3 mL/min/1.73     Narrative:      GFR Categories in Chronic Kidney Disease (CKD)              GFR Category          GFR (mL/min/1.73)    Interpretation  G1                    90 or greater        Normal or high (1)  G2                    60-89                Mild decrease (1)  G3a                   45-59                Mild to moderate decrease  G3b                   30-44                Moderate to severe decrease  G4                    15-29                Severe decrease  G5                    14 or less           Kidney failure    (1)In the absence of evidence of kidney disease, neither GFR category G1 or G2 fulfill the criteria for CKD.    eGFR calculation 2021 CKD-EPI creatinine equation, which does not include race as a factor    Lactic Acid, Plasma [[id number]]  (Normal) Collected: 05/27/25 2322    Specimen: Blood Updated: 05/27/25 2348     Lactate 1.8 mmol/L     CBC Auto Differential [[id number]]  (Abnormal) Collected: 05/27/25 2322    Specimen: Blood Updated: 05/27/25 2332     WBC 8.28 10*3/mm3      RBC 4.81 10*6/mm3      Hemoglobin 12.3 g/dL      Hematocrit 40.6 %      MCV 84.4 fL      MCH 25.6 pg      MCHC 30.3 g/dL      RDW 14.7 %      RDW-SD 45.2 fl      MPV 9.6 fL      Platelets 215 10*3/mm3      Neutrophil % 90.0 %      Lymphocyte % 5.6 %      Monocyte % 3.4 %      Eosinophil % 0.0 %      Basophil % 0.2 %      Immature Grans % 0.8 %      Neutrophils, Absolute 7.45 10*3/mm3      Lymphocytes, Absolute 0.46  10*3/mm3      Monocytes, Absolute 0.28 10*3/mm3      Eosinophils, Absolute 0.00 10*3/mm3      Basophils, Absolute 0.02 10*3/mm3      Immature Grans, Absolute 0.07 10*3/mm3      nRBC 0.0 /100 WBC     POC Glucose Once [[id number]]  (Abnormal) Collected: 05/28/25 0028    Specimen: Arterial Blood Updated: 05/28/25 0031     Glucose 436 mg/dL      Comment: Serial Number: [id number]:  [id number]       Blood Gas, Arterial - [[id number]]  (Abnormal) Collected: 05/28/25 0028    Specimen: Arterial Blood Updated: 05/28/25 0031     Site Left Radial     Shane's Test Positive     pH, Arterial 7.407 pH units      pCO2, Arterial 51.1 mm Hg      pO2, Arterial 60.1 mm Hg      HCO3, Arterial 32.1 mmol/L      Base Excess, Arterial 6.2 mmol/L      Comment: Serial Number: [id number]:  [id number]        O2 Saturation, Arterial 90.3 %      Hemoglobin, Blood Gas 12.3 g/dL      Hematocrit, Blood Gas 36.0 %      Barometric Pressure for Blood Gas 742.9000 mmHg      Modality Room Air     Hemodilution No    POC Lactate [[id number]]  (Normal) Collected: 05/28/25 0028    Specimen: Arterial Blood Updated: 05/28/25 0031     Lactate 0.9 mmol/L      Comment: Serial Number: [id number]:  [id number]       POC Electrolyte Panel [[id number]]  (Abnormal) Collected: 05/28/25 0028    Specimen: Arterial Blood Updated: 05/28/25 0031     Sodium 139 mmol/L      POC Potassium 4.4 mmol/L      Chloride 95 mmol/L      Ionized Calcium 1.16 mmol/L      Comment: Serial Number: [id number]:  [id number]       POC Glucose STAT [[id number]]  (Abnormal) Collected: 05/28/25 0148    Specimen: Blood Updated: 05/28/25 0150     Glucose 294 mg/dL      Comment: Serial Number: [id number]:  [id number]                Imaging:    XR Chest 1 View  Result Date: 5/27/2025  XR CHEST 1 VW Date of Exam: 5/27/2025 9:53 PM EDT Indication: Cough, persistent sob cough Comparison: May 6, 2025 Findings: A left internal jugular port has its tip at the low SVC. The lungs are clear. The heart and  mediastinal contours appear normal. There is pulmonary vascular congestion. The osseous structures appear intact.     Impression: 1.Pulmonary vascular congestion. 2.Lungs are clear. Electronically Signed: Ryan Farnsworth MD  5/27/2025 10:10 PM EDT  Workstation ID: [id number]        Differential Diagnosis and Discussion:    Wound Evaluation: Differential diagnosis includes but is not limited to laceration, abrasion, puncture, burn, ulcer, cellulitis, abscess, vasculitis, malignancy, and rash.    PROCEDURES:    Labs were collected in the emergency department and all labs were reviewed and interpreted by me.  X-ray were performed in the emergency department and all X-ray impressions were independently interpreted by me.    No orders to display       Procedures    MDM  Number of Diagnoses or Management Options  Diagnosis management comments: Patient presented to the emergency department with concern for wound infection.  Patient has chronic ulcers to bilateral lower extremities.  She has surrounding erythema to right lower extremity.  She does appear to have cellulitis.  Labs were obtained that showed white blood cell count of 8.  Glucose was elevated to 445 patient given IV fluids and insulin.  Glucose improved to 290.  Patient does not want to be admitted.  Patient's daughter is at bedside.  She currently lives with her daughter.  Her daughter feels comfortable taking her home and agrees to return for any concerning symptoms.  She is already being followed by wound care.  Will also place referral for vascular surgery.  Recommend she follows up closely with her primary care provider.  Discussed return precautions, discharge instructions and answered all their questions.       Amount and/or Complexity of Data Reviewed  Clinical lab tests: reviewed  Tests in the radiology section of CPT®: reviewed  Review and summarize past medical records: yes  Independent visualization of images, tracings, or specimens: yes    Risk of  Complications, Morbidity, and/or Mortality  Presenting problems: moderate  Management options: moderate                       Patient Care Considerations:    SEPSIS was considered but is NOT present in the emergency department as SIRS criteria is not present.      Consultants/Shared Management Plan:    None    Social Determinants of Health:    Patient has presented with family members who are responsible, reliable and will ensure follow up care.      Disposition and Care Coordination:    Discharged: I considered escalation of care by admitting this patient to the hospital, however patient does not want to be admitted    I have explained the patient´s condition, diagnoses and treatment plan based on the information available to me at this time. I have answered questions and addressed any concerns. The patient has a good  understanding of the patient´s diagnosis, condition, and treatment plan as can be expected at this point. The vital signs have been stable. The patient´s condition is stable and appropriate for discharge from the emergency department.      The patient will pursue further outpatient evaluation with the primary care physician or other designated or consulting physician as outlined in the discharge instructions. They are agreeable to this plan of care and follow-up instructions have been explained in detail. The patient has received these instructions in written format and has expressed an understanding of the discharge instructions. The patient is aware that any significant change in condition or worsening of symptoms should prompt an immediate return to this or the closest emergency department or call to 911.  I have explained discharge medications and the need for follow up with the patient/caretakers. This was also printed in the discharge instructions. Patient was discharged with the following medications and follow up:      Medication List        New Prescriptions      levoFLOXacin 750 MG  tablet  Commonly known as: LEVAQUIN  Take 1 tablet by mouth Daily for 5 days.               Where to Get Your Medications        These medications were sent to [facility] - [place], [place] - [address] - [phone]  - [phone] FX  [address] [address]      Phone: [phone]   levoFLOXacin 750 MG tablet      Drake Nelson MD  [address] [address]  [facility] [address]  [place] [id number]  [phone]    Schedule an appointment as soon as possible for a visit       Virgil Salas MD  [address] [facility] [address]  [phone]    In 2 days         Final diagnoses:   Cellulitis of right lower extremity   Chronic wound        ED Disposition       ED Disposition   Discharge    Condition   Stable    Comment   --               This medical record created using voice recognition software.             Bethanie Allred MD  05/28/25 1263

## 2025-05-28 NOTE — ED NOTES
Patient a&ox4 and agreeable to discharge. Patient education provided and follow up encouraged. One rx faxed to pharmacy. Skin pwd, rr even and unlabored, no s/sx of distress prior to discharge.

## 2025-05-30 ENCOUNTER — READMISSION MANAGEMENT (OUTPATIENT)
Dept: CALL CENTER | Facility: HOSPITAL | Age: 60
End: 2025-05-30
Payer: COMMERCIAL

## 2025-05-30 NOTE — OUTREACH NOTE
COPD/PN Week 3 Survey      Flowsheet Row Responses   [facility] facility patient discharged from? Go   Does the patient have one of the following disease processes/diagnoses(primary or secondary)? COPD   Week 3 attempt successful? No   Unsuccessful attempts Attempt 1   Revoke Decline to participate            Leanna SANTAMARIA - Registered Nurse

## 2025-06-01 LAB
BACTERIA SPEC AEROBE CULT: NORMAL
BACTERIA SPEC AEROBE CULT: NORMAL

## 2025-06-03 LAB
MYCOBACTERIUM SPEC CULT: NORMAL
MYCOBACTERIUM SPEC CULT: NORMAL
NIGHT BLUE STAIN TISS: NORMAL

## 2025-06-05 ENCOUNTER — HOSPITAL ENCOUNTER (INPATIENT)
Facility: HOSPITAL | Age: 60
LOS: 6 days | Discharge: LEFT BEFORE DISCHARGE | DRG: 189 | End: 2025-06-11
Attending: EMERGENCY MEDICINE | Admitting: INTERNAL MEDICINE
Payer: MEDICAID

## 2025-06-05 ENCOUNTER — APPOINTMENT (OUTPATIENT)
Dept: GENERAL RADIOLOGY | Facility: HOSPITAL | Age: 60
DRG: 189 | End: 2025-06-05
Payer: MEDICAID

## 2025-06-05 ENCOUNTER — TRANSCRIBE ORDERS (OUTPATIENT)
Dept: ADMINISTRATIVE | Facility: HOSPITAL | Age: 60
End: 2025-06-05
Payer: MEDICAID

## 2025-06-05 DIAGNOSIS — J96.22 ACUTE ON CHRONIC RESPIRATORY FAILURE WITH HYPOXIA AND HYPERCAPNIA: ICD-10-CM

## 2025-06-05 DIAGNOSIS — C81.90 HODGKIN LYMPHOMA, UNSPECIFIED HODGKIN LYMPHOMA TYPE, UNSPECIFIED BODY REGION: Primary | ICD-10-CM

## 2025-06-05 DIAGNOSIS — J44.1 COPD EXACERBATION: Primary | ICD-10-CM

## 2025-06-05 DIAGNOSIS — J96.21 ACUTE ON CHRONIC RESPIRATORY FAILURE WITH HYPOXIA AND HYPERCAPNIA: ICD-10-CM

## 2025-06-05 LAB
ALBUMIN SERPL-MCNC: 3.2 G/DL (ref 3.5–5.2)
ALBUMIN/GLOB SERPL: 1.1 G/DL
ALP SERPL-CCNC: 129 U/L (ref 39–117)
ALT SERPL W P-5'-P-CCNC: 9 U/L (ref 1–33)
AMPHET+METHAMPHET UR QL: NEGATIVE
AMPHETAMINES UR QL: NEGATIVE
ANION GAP SERPL CALCULATED.3IONS-SCNC: 8.4 MMOL/L (ref 5–15)
ARTERIAL PATENCY WRIST A: POSITIVE
AST SERPL-CCNC: 10 U/L (ref 1–32)
ATMOSPHERIC PRESS: 743.6 MMHG
BACTERIA UR QL AUTO: NORMAL /HPF
BARBITURATES UR QL SCN: NEGATIVE
BASE EXCESS BLDA CALC-SCNC: 7.6 MMOL/L (ref -2–2)
BASOPHILS # BLD AUTO: 0.02 10*3/MM3 (ref 0–0.2)
BASOPHILS NFR BLD AUTO: 0.3 % (ref 0–1.5)
BDY SITE: ABNORMAL
BENZODIAZ UR QL SCN: NEGATIVE
BILIRUB SERPL-MCNC: 0.3 MG/DL (ref 0–1.2)
BILIRUB UR QL STRIP: NEGATIVE
BUN SERPL-MCNC: 15.1 MG/DL (ref 6–20)
BUN/CREAT SERPL: 41.9 (ref 7–25)
BUPRENORPHINE SERPL-MCNC: NEGATIVE NG/ML
CA-I BLDA-SCNC: 1.25 MMOL/L (ref 1.13–1.32)
CALCIUM SPEC-SCNC: 8.2 MG/DL (ref 8.6–10.5)
CANNABINOIDS SERPL QL: NEGATIVE
CHLORIDE BLDA-SCNC: 93 MMOL/L (ref 98–107)
CHLORIDE SERPL-SCNC: 97 MMOL/L (ref 98–107)
CLARITY UR: CLEAR
CO2 SERPL-SCNC: 30.6 MMOL/L (ref 22–29)
COCAINE UR QL: NEGATIVE
COD CRY URNS QL: NORMAL /HPF
COLOR UR: ABNORMAL
CREAT SERPL-MCNC: 0.36 MG/DL (ref 0.57–1)
CRP SERPL-MCNC: 5.95 MG/DL (ref 0–0.5)
D-LACTATE SERPL-SCNC: 0.8 MMOL/L
D-LACTATE SERPL-SCNC: 0.9 MMOL/L (ref 0.5–2)
DEPRECATED RDW RBC AUTO: 46.5 FL (ref 37–54)
EGFRCR SERPLBLD CKD-EPI 2021: 117.1 ML/MIN/1.73
EOSINOPHIL # BLD AUTO: 0.03 10*3/MM3 (ref 0–0.4)
EOSINOPHIL NFR BLD AUTO: 0.4 % (ref 0.3–6.2)
ERYTHROCYTE [DISTWIDTH] IN BLOOD BY AUTOMATED COUNT: 14.9 % (ref 12.3–15.4)
FENTANYL UR-MCNC: POSITIVE NG/ML
GAS FLOW AIRWAY: 3 LPM
GEN 5 1HR TROPONIN T REFLEX: 29 NG/L
GLOBULIN UR ELPH-MCNC: 2.8 GM/DL
GLUCOSE BLDC GLUCOMTR-MCNC: 283 MG/DL (ref 70–99)
GLUCOSE BLDC GLUCOMTR-MCNC: 341 MG/DL (ref 70–99)
GLUCOSE BLDC GLUCOMTR-MCNC: 357 MG/DL (ref 65–99)
GLUCOSE BLDC GLUCOMTR-MCNC: 362 MG/DL (ref 70–99)
GLUCOSE BLDC GLUCOMTR-MCNC: 372 MG/DL (ref 70–99)
GLUCOSE SERPL-MCNC: 357 MG/DL (ref 65–99)
GLUCOSE UR STRIP-MCNC: ABNORMAL MG/DL
HCO3 BLDA-SCNC: 36.8 MMOL/L (ref 22–26)
HCT VFR BLD AUTO: 41.2 % (ref 34–46.6)
HCT VFR BLD CALC: 45 % (ref 38–51)
HEMODILUTION: NO
HGB BLD-MCNC: 12.2 G/DL (ref 12–15.9)
HGB BLDA-MCNC: 15.2 G/DL (ref 12–18)
HGB UR QL STRIP.AUTO: NEGATIVE
HOLD SPECIMEN: NORMAL
HOLD SPECIMEN: NORMAL
HYALINE CASTS UR QL AUTO: NORMAL /LPF
IMM GRANULOCYTES # BLD AUTO: 0.11 10*3/MM3 (ref 0–0.05)
IMM GRANULOCYTES NFR BLD AUTO: 1.4 % (ref 0–0.5)
INHALED O2 CONCENTRATION: 32 %
KETONES UR QL STRIP: ABNORMAL
L PNEUMO1 AG UR QL IA: NEGATIVE
LEUKOCYTE ESTERASE UR QL STRIP.AUTO: NEGATIVE
LIPASE SERPL-CCNC: 66 U/L (ref 13–60)
LYMPHOCYTES # BLD AUTO: 0.6 10*3/MM3 (ref 0.7–3.1)
LYMPHOCYTES NFR BLD AUTO: 7.6 % (ref 19.6–45.3)
Lab: ABNORMAL
M PNEUMO IGM SER QL: NEGATIVE
MAGNESIUM SERPL-MCNC: 1.5 MG/DL (ref 1.6–2.6)
MCH RBC QN AUTO: 25.4 PG (ref 26.6–33)
MCHC RBC AUTO-ENTMCNC: 29.6 G/DL (ref 31.5–35.7)
MCV RBC AUTO: 85.7 FL (ref 79–97)
METHADONE UR QL SCN: NEGATIVE
MODALITY: ABNORMAL
MONOCYTES # BLD AUTO: 0.48 10*3/MM3 (ref 0.1–0.9)
MONOCYTES NFR BLD AUTO: 6.1 % (ref 5–12)
NEUTROPHILS NFR BLD AUTO: 6.67 10*3/MM3 (ref 1.7–7)
NEUTROPHILS NFR BLD AUTO: 84.2 % (ref 42.7–76)
NITRITE UR QL STRIP: NEGATIVE
NOTIFIED WHO: ABNORMAL
NRBC BLD AUTO-RTO: 0 /100 WBC (ref 0–0.2)
NT-PROBNP SERPL-MCNC: 488.5 PG/ML (ref 0–900)
OPIATES UR QL: POSITIVE
OXYCODONE UR QL SCN: POSITIVE
PCO2 BLDA: 71.5 MM HG (ref 35–45)
PCP UR QL SCN: NEGATIVE
PH BLDA: 7.32 PH UNITS (ref 7.35–7.45)
PH UR STRIP.AUTO: 6 [PH] (ref 5–8)
PLATELET # BLD AUTO: 150 10*3/MM3 (ref 140–450)
PMV BLD AUTO: 10.7 FL (ref 6–12)
PO2 BLD: 158 MM[HG] (ref 0–500)
PO2 BLDA: 50.6 MM HG (ref 80–100)
POTASSIUM BLDA-SCNC: 4 MMOL/L (ref 3.5–5)
POTASSIUM SERPL-SCNC: 3.7 MMOL/L (ref 3.5–5.2)
PROCALCITONIN SERPL-MCNC: 0.12 NG/ML (ref 0–0.25)
PROT SERPL-MCNC: 6 G/DL (ref 6–8.5)
PROT UR QL STRIP: ABNORMAL
QT INTERVAL: 359 MS
QTC INTERVAL: 483 MS
RBC # BLD AUTO: 4.81 10*6/MM3 (ref 3.77–5.28)
RBC # UR STRIP: NORMAL /HPF
READ BACK: YES
REF LAB TEST METHOD: NORMAL
S PNEUM AG SPEC QL LA: NEGATIVE
SAO2 % BLDCOA: 80.4 % (ref 95–99)
SODIUM BLD-SCNC: 142 MMOL/L (ref 131–143)
SODIUM SERPL-SCNC: 136 MMOL/L (ref 136–145)
SP GR UR STRIP: >1.03 (ref 1–1.03)
SQUAMOUS #/AREA URNS HPF: NORMAL /HPF
TRICYCLICS UR QL SCN: NEGATIVE
TROPONIN T % DELTA: -3
TROPONIN T NUMERIC DELTA: -1 NG/L
TROPONIN T SERPL HS-MCNC: 30 NG/L
UROBILINOGEN UR QL STRIP: ABNORMAL
WBC # UR STRIP: NORMAL /HPF
WBC NRBC COR # BLD AUTO: 7.91 10*3/MM3 (ref 3.4–10.8)
WHOLE BLOOD HOLD COAG: NORMAL
WHOLE BLOOD HOLD SPECIMEN: NORMAL

## 2025-06-05 PROCEDURE — 86140 C-REACTIVE PROTEIN: CPT | Performed by: INTERNAL MEDICINE

## 2025-06-05 PROCEDURE — 87899 AGENT NOS ASSAY W/OPTIC: CPT | Performed by: INTERNAL MEDICINE

## 2025-06-05 PROCEDURE — 94799 UNLISTED PULMONARY SVC/PX: CPT

## 2025-06-05 PROCEDURE — 83735 ASSAY OF MAGNESIUM: CPT | Performed by: EMERGENCY MEDICINE

## 2025-06-05 PROCEDURE — 82330 ASSAY OF CALCIUM: CPT

## 2025-06-05 PROCEDURE — 25010000002 ENOXAPARIN PER 10 MG: Performed by: INTERNAL MEDICINE

## 2025-06-05 PROCEDURE — 80307 DRUG TEST PRSMV CHEM ANLYZR: CPT | Performed by: EMERGENCY MEDICINE

## 2025-06-05 PROCEDURE — 82803 BLOOD GASES ANY COMBINATION: CPT

## 2025-06-05 PROCEDURE — P9612 CATHETERIZE FOR URINE SPEC: HCPCS

## 2025-06-05 PROCEDURE — 25810000003 SODIUM CHLORIDE 0.9 % SOLUTION: Performed by: EMERGENCY MEDICINE

## 2025-06-05 PROCEDURE — 93005 ELECTROCARDIOGRAM TRACING: CPT

## 2025-06-05 PROCEDURE — 36600 WITHDRAWAL OF ARTERIAL BLOOD: CPT

## 2025-06-05 PROCEDURE — 36415 COLL VENOUS BLD VENIPUNCTURE: CPT

## 2025-06-05 PROCEDURE — 93005 ELECTROCARDIOGRAM TRACING: CPT | Performed by: EMERGENCY MEDICINE

## 2025-06-05 PROCEDURE — 82948 REAGENT STRIP/BLOOD GLUCOSE: CPT

## 2025-06-05 PROCEDURE — 80053 COMPREHEN METABOLIC PANEL: CPT | Performed by: EMERGENCY MEDICINE

## 2025-06-05 PROCEDURE — 25010000002 ACETAMINOPHEN 10 MG/ML SOLUTION: Performed by: INTERNAL MEDICINE

## 2025-06-05 PROCEDURE — 81001 URINALYSIS AUTO W/SCOPE: CPT | Performed by: EMERGENCY MEDICINE

## 2025-06-05 PROCEDURE — 25810000003 SODIUM CHLORIDE 0.9 % SOLUTION 250 ML FLEX CONT: Performed by: INTERNAL MEDICINE

## 2025-06-05 PROCEDURE — 85025 COMPLETE CBC W/AUTO DIFF WBC: CPT | Performed by: EMERGENCY MEDICINE

## 2025-06-05 PROCEDURE — 80051 ELECTROLYTE PANEL: CPT

## 2025-06-05 PROCEDURE — 83690 ASSAY OF LIPASE: CPT | Performed by: EMERGENCY MEDICINE

## 2025-06-05 PROCEDURE — 82948 REAGENT STRIP/BLOOD GLUCOSE: CPT | Performed by: INTERNAL MEDICINE

## 2025-06-05 PROCEDURE — 84145 PROCALCITONIN (PCT): CPT | Performed by: INTERNAL MEDICINE

## 2025-06-05 PROCEDURE — 63710000001 INSULIN LISPRO (HUMAN) PER 5 UNITS: Performed by: INTERNAL MEDICINE

## 2025-06-05 PROCEDURE — 94660 CPAP INITIATION&MGMT: CPT

## 2025-06-05 PROCEDURE — 94640 AIRWAY INHALATION TREATMENT: CPT

## 2025-06-05 PROCEDURE — 87040 BLOOD CULTURE FOR BACTERIA: CPT | Performed by: EMERGENCY MEDICINE

## 2025-06-05 PROCEDURE — 83880 ASSAY OF NATRIURETIC PEPTIDE: CPT | Performed by: EMERGENCY MEDICINE

## 2025-06-05 PROCEDURE — 84484 ASSAY OF TROPONIN QUANT: CPT | Performed by: EMERGENCY MEDICINE

## 2025-06-05 PROCEDURE — 25010000002 AZITHROMYCIN PER 500 MG: Performed by: INTERNAL MEDICINE

## 2025-06-05 PROCEDURE — 99291 CRITICAL CARE FIRST HOUR: CPT

## 2025-06-05 PROCEDURE — 87449 NOS EACH ORGANISM AG IA: CPT | Performed by: INTERNAL MEDICINE

## 2025-06-05 PROCEDURE — 86738 MYCOPLASMA ANTIBODY: CPT | Performed by: INTERNAL MEDICINE

## 2025-06-05 PROCEDURE — 83605 ASSAY OF LACTIC ACID: CPT | Performed by: EMERGENCY MEDICINE

## 2025-06-05 PROCEDURE — 71045 X-RAY EXAM CHEST 1 VIEW: CPT

## 2025-06-05 PROCEDURE — 93010 ELECTROCARDIOGRAM REPORT: CPT | Performed by: INTERNAL MEDICINE

## 2025-06-05 PROCEDURE — 83605 ASSAY OF LACTIC ACID: CPT

## 2025-06-05 PROCEDURE — 99223 1ST HOSP IP/OBS HIGH 75: CPT | Performed by: INTERNAL MEDICINE

## 2025-06-05 PROCEDURE — 25010000002 METHYLPREDNISOLONE PER 125 MG: Performed by: EMERGENCY MEDICINE

## 2025-06-05 RX ORDER — DEXTROSE MONOHYDRATE 25 G/50ML
25 INJECTION, SOLUTION INTRAVENOUS
Status: DISCONTINUED | OUTPATIENT
Start: 2025-06-05 | End: 2025-06-09

## 2025-06-05 RX ORDER — KETOROLAC TROMETHAMINE 15 MG/ML
15 INJECTION, SOLUTION INTRAMUSCULAR; INTRAVENOUS EVERY 6 HOURS PRN
Status: DISPENSED | OUTPATIENT
Start: 2025-06-05 | End: 2025-06-07

## 2025-06-05 RX ORDER — OXYCODONE HYDROCHLORIDE 10 MG/1
1 TABLET ORAL
COMMUNITY
Start: 2025-06-03

## 2025-06-05 RX ORDER — SODIUM CHLORIDE 9 MG/ML
40 INJECTION, SOLUTION INTRAVENOUS AS NEEDED
Status: DISCONTINUED | OUTPATIENT
Start: 2025-06-05 | End: 2025-06-11 | Stop reason: HOSPADM

## 2025-06-05 RX ORDER — IBUPROFEN 600 MG/1
1 TABLET ORAL
Status: DISCONTINUED | OUTPATIENT
Start: 2025-06-05 | End: 2025-06-09

## 2025-06-05 RX ORDER — IPRATROPIUM BROMIDE AND ALBUTEROL SULFATE 2.5; .5 MG/3ML; MG/3ML
3 SOLUTION RESPIRATORY (INHALATION)
Status: DISCONTINUED | OUTPATIENT
Start: 2025-06-05 | End: 2025-06-09

## 2025-06-05 RX ORDER — PREDNISONE 20 MG/1
40 TABLET ORAL
Status: DISCONTINUED | OUTPATIENT
Start: 2025-06-06 | End: 2025-06-09

## 2025-06-05 RX ORDER — ACETAMINOPHEN 650 MG/1
650 SUPPOSITORY RECTAL EVERY 4 HOURS PRN
Status: DISCONTINUED | OUTPATIENT
Start: 2025-06-05 | End: 2025-06-11 | Stop reason: HOSPADM

## 2025-06-05 RX ORDER — HYDROXYZINE HYDROCHLORIDE 25 MG/1
25 TABLET, FILM COATED ORAL 3 TIMES DAILY PRN
Status: DISCONTINUED | OUTPATIENT
Start: 2025-06-05 | End: 2025-06-10

## 2025-06-05 RX ORDER — NICOTINE POLACRILEX 4 MG
15 LOZENGE BUCCAL
Status: DISCONTINUED | OUTPATIENT
Start: 2025-06-05 | End: 2025-06-09

## 2025-06-05 RX ORDER — ENOXAPARIN SODIUM 100 MG/ML
40 INJECTION SUBCUTANEOUS DAILY
Status: DISCONTINUED | OUTPATIENT
Start: 2025-06-05 | End: 2025-06-09

## 2025-06-05 RX ORDER — ACETAMINOPHEN 10 MG/ML
1000 INJECTION, SOLUTION INTRAVENOUS ONCE
Status: COMPLETED | OUTPATIENT
Start: 2025-06-05 | End: 2025-06-05

## 2025-06-05 RX ORDER — SODIUM CHLORIDE 0.9 % (FLUSH) 0.9 %
10 SYRINGE (ML) INJECTION AS NEEDED
Status: DISCONTINUED | OUTPATIENT
Start: 2025-06-05 | End: 2025-06-11 | Stop reason: HOSPADM

## 2025-06-05 RX ORDER — ARFORMOTEROL TARTRATE 15 UG/2ML
15 SOLUTION RESPIRATORY (INHALATION)
Status: DISCONTINUED | OUTPATIENT
Start: 2025-06-05 | End: 2025-06-09

## 2025-06-05 RX ORDER — ACETAMINOPHEN 325 MG/1
650 TABLET ORAL EVERY 4 HOURS PRN
Status: DISCONTINUED | OUTPATIENT
Start: 2025-06-05 | End: 2025-06-11 | Stop reason: HOSPADM

## 2025-06-05 RX ORDER — NALOXONE HCL 0.4 MG/ML
0.8 VIAL (ML) INJECTION ONCE
Status: DISCONTINUED | OUTPATIENT
Start: 2025-06-05 | End: 2025-06-10

## 2025-06-05 RX ORDER — ASPIRIN 81 MG/1
324 TABLET, CHEWABLE ORAL ONCE
Status: DISCONTINUED | OUTPATIENT
Start: 2025-06-05 | End: 2025-06-06

## 2025-06-05 RX ORDER — BUDESONIDE 0.5 MG/2ML
0.5 INHALANT ORAL
Status: DISCONTINUED | OUTPATIENT
Start: 2025-06-05 | End: 2025-06-09

## 2025-06-05 RX ORDER — SODIUM CHLORIDE 0.9 % (FLUSH) 0.9 %
10 SYRINGE (ML) INJECTION EVERY 12 HOURS SCHEDULED
Status: DISCONTINUED | OUTPATIENT
Start: 2025-06-05 | End: 2025-06-11 | Stop reason: HOSPADM

## 2025-06-05 RX ORDER — INSULIN LISPRO 100 [IU]/ML
2-7 INJECTION, SOLUTION INTRAVENOUS; SUBCUTANEOUS
Status: DISCONTINUED | OUTPATIENT
Start: 2025-06-05 | End: 2025-06-09

## 2025-06-05 RX ORDER — IPRATROPIUM BROMIDE AND ALBUTEROL SULFATE 2.5; .5 MG/3ML; MG/3ML
3 SOLUTION RESPIRATORY (INHALATION)
Status: COMPLETED | OUTPATIENT
Start: 2025-06-05 | End: 2025-06-05

## 2025-06-05 RX ORDER — ACETAMINOPHEN 160 MG/5ML
650 SOLUTION ORAL EVERY 4 HOURS PRN
Status: DISCONTINUED | OUTPATIENT
Start: 2025-06-05 | End: 2025-06-11 | Stop reason: HOSPADM

## 2025-06-05 RX ADMIN — IPRATROPIUM BROMIDE AND ALBUTEROL SULFATE 3 ML: .5; 3 SOLUTION RESPIRATORY (INHALATION) at 18:47

## 2025-06-05 RX ADMIN — ARFORMOTEROL TARTRATE 15 MCG: 15 SOLUTION RESPIRATORY (INHALATION) at 18:47

## 2025-06-05 RX ADMIN — BUDESONIDE 0.5 MG: 0.5 SUSPENSION RESPIRATORY (INHALATION) at 18:47

## 2025-06-05 RX ADMIN — IPRATROPIUM BROMIDE AND ALBUTEROL SULFATE 3 ML: .5; 3 SOLUTION RESPIRATORY (INHALATION) at 14:09

## 2025-06-05 RX ADMIN — INSULIN LISPRO 4 UNITS: 100 INJECTION, SOLUTION INTRAVENOUS; SUBCUTANEOUS at 20:50

## 2025-06-05 RX ADMIN — ACETAMINOPHEN 1000 MG: 10 INJECTION INTRAVENOUS at 17:57

## 2025-06-05 RX ADMIN — AZITHROMYCIN DIHYDRATE 500 MG: 500 INJECTION, POWDER, LYOPHILIZED, FOR SOLUTION INTRAVENOUS at 15:50

## 2025-06-05 RX ADMIN — METHYLPREDNISOLONE SODIUM SUCCINATE 125 MG: 125 INJECTION, POWDER, LYOPHILIZED, FOR SOLUTION INTRAMUSCULAR; INTRAVENOUS at 13:44

## 2025-06-05 RX ADMIN — IPRATROPIUM BROMIDE AND ALBUTEROL SULFATE 3 ML: .5; 3 SOLUTION RESPIRATORY (INHALATION) at 14:10

## 2025-06-05 RX ADMIN — IPRATROPIUM BROMIDE AND ALBUTEROL SULFATE 3 ML: .5; 3 SOLUTION RESPIRATORY (INHALATION) at 14:11

## 2025-06-05 RX ADMIN — SODIUM CHLORIDE 1000 ML: 9 INJECTION, SOLUTION INTRAVENOUS at 13:43

## 2025-06-05 RX ADMIN — ENOXAPARIN SODIUM 40 MG: 100 INJECTION SUBCUTANEOUS at 17:57

## 2025-06-05 RX ADMIN — Medication 10 ML: at 20:51

## 2025-06-05 RX ADMIN — INSULIN LISPRO 6 UNITS: 100 INJECTION, SOLUTION INTRAVENOUS; SUBCUTANEOUS at 18:03

## 2025-06-05 NOTE — PAYOR COMM NOTE
Isha Llanes (59 y.o. Female)   PATIENT INFORMATION  Name:  Isha Llanes  MRN#:     [id number]  :  1965     ADMISSION INFORMATION  CLASS: Inpatient   DOS:  2025    CURRENT ATTENDING PROVIDER INFORMATION  Name/NPI: Pranay Ramírez MD [[id number]]  Phone:  Phone: [phone]  Fax:  [phone]    REQUESTING PROVIDER and RENDERING FACILITY  Name:  [facility]   NPI:  [id number]  TID:  [id number]  Address:      [facility] [address] [place] [address]  Phone:               [phone]  Fax:  [phone]    UTILIZATION REVIEW CONTACT INFORMATION  Phone:      [phone]  Fax:           [phone]    ADMISSION DIAGNOSIS  Hypercapnia [R06.89]  COPD exacerbation [J44.1]  Acute on chronic respiratory failure with hypoxia and hypercapnia [J96.21, J96.22]    ++++++++++++++++++++++++++++++++++++++++++++++++++++++++++++++++++++++++++++++++      Date of Birth   1965    Social Security Number       Address   [address] [place] [address]    Home Phone   [phone]    MRN   [id number]       Presybeterian   None    Marital Status   Legally                             Admission Date   2025    Admission Type   Emergency    Admitting Provider   Pranay Ramírez MD    Attending Provider   Pranay Ramírez MD    Department, Room/Bed   [facility] 4TH FLOOR MEDICAL TELEMETRY UNIT, 408/       Discharge Date       Discharge Disposition       Discharge Destination                                 Attending Provider: Pranay Ramírez MD    Allergies: Amoxicillin, Ceclor [Cefaclor], Penicillins, Sulfa Antibiotics, Valium [Diazepam], Ambien [Zolpidem], Aspirin, Ativan [Lorazepam], Benadryl [Diphenhydramine], Biaxin [Clarithromycin], Cephalexin, Clindamycin, Compazine [Prochlorperazine Edisylate], Contrast Dye (Echo Or Unknown Ct/mr), Doxycycline, Nsaids, Phenergan [Promethazine Hcl], Promethazine, Vancomycin    Isolation: None   Infection: None   Code Status: CPR  "   Ht: 167.6 cm (65.98\")   Wt: 53.5 kg (117 lb 15.1 oz)    Admission Cmt: None   Principal Problem: Hypercapnia [R06.89]                   Active Insurance as of 6/5/2025       Primary Coverage       Payor Plan Insurance Group Employer/Plan Group    [place] [id number]       Payor Plan Address Payor Plan Phone Number Payor Plan Fax Number Effective Dates    [employer] [address] [address] [phone]  5/1/2025 - None Entered    [facility] [address]         Subscriber Name Subscriber Birth Date Member ID       RENÉE ABARCA 1965 [id number]                      Respiratory Failure GRG Clinical Indications for Admission to Inpatient Care       Indications Met   Last updated by Karine Monge RN on 6/5/2025 1796     Review Status Created By   Primary Completed Karine Monge RN      Criteria Review   Respiratory Failure GRG Clinical Indications for Admission to Inpatient Care     Overall Determination: Indications Met     Criteria:  [×] Hospital admission is needed for appropriate care of the patient because of  1 or more  of the following  (1) (2) (3) (4) (5) (6) (7) (8) (9):      [×] New (acute) need for mechanical invasive or noninvasive (eg, BPAP, volume-assured pressure support (VAPS), or volume control) ventilation (10) (11) (12) (13) (14) (15) (16)          6/5/2025  3:43 PM              -- 6/5/2025  3:43 PM by Karine Monge RN --                  New orders for BIPAP      [×] Severe ventilation deficit, as indicated by  1 or more  of the following  (2) (14):          [×] Hypercarbia (PaCO2 greater than 45 mm Hg (6.0 kPa))-induced respiratory acidosis (pH less than 7.35)              6/5/2025  3:43 PM                  -- 6/5/2025  3:43 PM by Karine Monge RN --                      pH 7.32, pCO2 71.5     Notes:  -- 6/5/2025  3:43 PM by Karine Monge, RN --      Subject: Admission      To ED c/o SOA, weakness, chest discomfort & BLE swelling & redness.       Is not on home O2.    "    -113. Lungs w/rhonchi & wheezing. + BLE edema. Diffuse erythema with pressure ulcers noted bilaterally to the legs       + Lethargic                  PMHx: Hodgkin's Lymphoma, PE, CHF, COPD, PE, Aortic valve replacement. Chronic pain. Has intrathecal pump.                   ED results:       ABGs on 3L : pH 7.32, pCO2 71.5. pO2 50. Sats 80.4      Glucose 357, Lipase 66, Mg+ 1.5      H/H ok      Trops 30 & 29.       UA drug screen: + opiates, + oxycodone. + Fentanyl.                   CXR: NAD                  In ED:       O2 3L      IV NS 1000ml bolus      SoluMedrol 125 IV      Duonebs x 3      BC x2 pending.                   Admit:      Telemetry      O2      Start BIPAP      Zithromax IV qd      Prednisone PO qd      SSI      Brovana/Pulmicort nebs bid      Duonebs q4h      Lovenox SQ              History & Physical        Pranay Ramírez MD at 25 1435           [facility] HISTORY AND PHYSICAL  Date: 2025   Patient Name: Isha Llanes  : 1965  MRN: [id number]  Primary Care Physician:  Virgil Salas MD  Date of admission: 2025    Subjective  Subjective     Chief Complaint: Shortness of breath    HPI:    Isha Llanes is a 59 y.o. female past medical history of aortic valve stenosis post-fire valve, COPD not on home oxygen, heart failure preserved ejection pressure, type 2 diabetes, Hodgkin's lymphoma, head neck cancer, anaplastic lymphoma on active treatment, and history of chronic pain on intrathecal pain pump who presents with respiratory distress.    When I saw the patient initially she was on BiPAP and she was minimally responsive.  I would have to wake her up for her to take breaths or her tidal volume would be around 100.  When she woke up she could pull normal volumes.  It is unclear if the patient received any narcotics on the drive over an EMS.  So that she is also had some chest discomfort.  Patient has been admitted several times in the  past for respiratory failure.  As result she was transported to the ER for further evaluation.    In the emergency department vital signs are as follows: Temperature is 98, pulse 101, respiratory is 18, blood pressure 104/73, satting 97% on 3 L.  Patient's ABG does show that she is hypoxemic on arrival with a PO2 with 50.  CBC shows no acute abnormalities.  CMP shows no acute abnormalities of her magnesium 1.5 and elevated glucose.  BNP was normal.  ABG is 7.3/71/50.  UDS shows positive for fentanyl opioids and oxycodone.  Blood cultures were sent.  Chest x-ray shows no acute pulmonary process.  Patient was extremely sleepy.  Continue BiPAP.  At some point patient refused BiPAP but was more awake after receiving Narcan.  Will treat her for acute COPD exacerbation and acute hypoxic respiratory failure.  Chest has bilateral venous changes on her legs at this time I do not think they are infected we will have the wound nurse see her we will also get further data to help determine where this is infection it is chronic changes.  Patient was in the hospital for acute hypoxic respiratory failure due to COPD as well as mixture of opioids and benzodiazepines depressing her respiratory effort.    All systems reviewed and really as noted above    Personal History     Past Medical History:  Aortic valve stenosis status post bio valve  COPD not on home oxygen  Heart failure preserved ejection fraction EF of 61 to 65% and grade 1 diastolic dysfunction  Type 2 diabetes  Hodgkin's lymphoma  Intrathecal pain pump  History of DVT  SLE ?  Stroke  Head neck cancer    Past Surgical History:  Abdominal surgery  Back surgery on bronchoscopy with Ion robot assist  Cardiac catheterization with valvuloplasty  Cardiac valve replacement  Cholecystectomy  Colonoscopy   Direct laryngoscopy  Endoscopy with PEG placement  Hysterectomy on pain pump insertion    Family History:   ADHD  Pancreatic cancer    prostate cancer    Social History:   Every  day smoker.  No alcohol.    Home Medications:  ALPRAZolam, Insulin Lispro (1 Unit Dial), PARoxetine, budesonide, famotidine, furosemide, ipratropium-albuterol, loperamide, metoprolol succinate XL, ondansetron ODT, oxyCODONE, pain, and predniSONE    Allergies:  Allergies   Allergen Reactions    Amoxicillin Shortness Of Breath     Has tolerated Cefazolin, Ceftriaxone, and Cefepime -[facility], [facility]    Ceclor [Cefaclor] Shortness Of Breath     Has tolerated Cefazolin, Ceftriaxone, and Cefepime -[facility], [facility]      Penicillins Shortness Of Breath     Has tolerated Cefazolin, Ceftriaxone, and Cefepime -[facility], [facility]    Sulfa Antibiotics Rash    Valium [Diazepam] Mental Status Change     DEPRESSED    Ambien [Zolpidem] Mental Status Change    Aspirin GI Intolerance    Ativan [Lorazepam] Mental Status Change    Benadryl [Diphenhydramine] Anxiety     AND MAKES HER HYPER    Biaxin [Clarithromycin] Unknown - Low Severity    Cephalexin Unknown - Low Severity    Clindamycin Unknown - Low Severity    Compazine [Prochlorperazine Edisylate] Rash    Contrast Dye (Echo Or Unknown Ct/Mr) Other (See Comments)     Caused pain in her arm    Doxycycline Rash    Nsaids GI Intolerance    Phenergan [Promethazine Hcl] GI Intolerance    Promethazine GI Intolerance    Vancomycin Itching           Objective  Objective     Vitals:   Temp:  [98 °F (36.7 °C)] 98 °F (36.7 °C)  Heart Rate:  [] 83  Resp:  [18-20] 18  BP: ()/(52-93) 106/78  Flow (L/min) (Oxygen Therapy):  [3] 3    Physical Exam    Constitutional: Chronically ill-appearing.   Eyes: Pupils equal, sclerae anicteric, no conjunctival injection   HENT: NCAT, mucous membranes moist   Neck: Supple, no thyromegaly, no lymphadenopathy, trachea midline   Respiratory:  wheezing throughout.   Cardiovascular: RRR, no murmurs, rubs, or gallops, palpable pedal pulses bilaterally   Gastrointestinal: Positive bowel sounds, soft, nontender, nondistended   Musculoskeletal:  Bilateral venous changes.  She does have a couple of open lesions.  No streaking erythema up the leg.  No warmth.  Chronic   Psychiatric: Appropriate affect, cooperative   Neurologic: Oriented x 3, strength symmetric in all extremities, Cranial Nerves grossly intact to confrontation, speech clear   Skin: No rashes     Result Review   Result Review:  I have personally reviewed the results from the time of this admission to 6/5/2025 17:22 EDT and agree with these findings:  Imaging and labs were reviewed      Assessment & Plan  Assessment / Plan     Assessment/Plan:   Acute metabolic encephalopathy due to opioids and COPD  Acute hypoxic respiratory failure due to COPD  Acute hypercapnic respiratory failure due to COPD and opioid use  Type 2 diabetes with hyperglycemia  Heart failure without exacerbation  Hodgkin's lymphoma in 2008  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  Anaplastic large cell lymphoma    Addendum: Was called to bedside.  The patient was much more awake than she had been earlier in the day.  I think that the Narcan did help reverse the fentanyl that apparently she received on her way here.  I am still very concerned about the patient's respiratory status.  I had a long discussion with the family and I also discussed the patient's presentation to the ER physician we both agree that opioid medications are not the safest thing for her right now.  We did discuss and broach comfort care versus hospice care the patient is not interested in that right now.  Family agrees that the patient is a fighter.  Will give her IV Tylenol as well as make Toradol available for pain control.  I am hesitant until I see her response to give her any more opioids.  She does have Dilaudid pain pump going.      Plan:  --Admit to hospital service  --Patient was sleepy most likely due to a combination of COPD and opioid use.  She was initially placed on BiPAP.  The  patient eventually woke up more and refused to wear the BiPAP.  It was explained to her that if she does not wear BiPAP that she is at risk of intubation or death  -- Still continue to recommend BiPAP will watch patient's mental status  -- Brovana/Pulmicort/DuoNebs  --Doxycycline  -- Prednisone 40  -- IV Tylenol and Toradol due to severe respiratory distress and opioid use  --If patient does require opioids throughout the night then would only use half her home dose and would avoid mixing both opioids and benzodiazepines.  -- Patient's bilateral legs do appear to have venous changes.  I do not see anything that is concerning for infection at this time.  --Consult wound nurse  -- We will get procalcitonin and CRP if those are concerning for infection will start cefazolin  -- Palliative care consult    VTE Prophylaxis:  Lovenox        CODE STATUS:    Code Status (Patient has no pulse and is not breathing): CPR (Attempt to Resuscitate)  Medical Interventions (Patient has pulse or is breathing): Full Support  Level Of Support Discussed With: Patient      Admission Status:  I believe this patient meets full code status.    Electronically signed by Pranay Ramírez MD, 06/05/25, 2:35 PM EDT.             Electronically signed by Pranay Ramírez MD at 06/05/25 1728       Current Facility-Administered Medications   Medication Dose Route Frequency Provider Last Rate Last Admin    acetaminophen (OFIRMEV) injection 1,000 mg  1,000 mg Intravenous Once Pranay Ramírez MD   1,000 mg at 06/05/25 1757    acetaminophen (TYLENOL) tablet 650 mg  650 mg Oral Q4H PRN Pranay Ramírez MD        Or    acetaminophen (TYLENOL) 160 MG/5ML oral solution 650 mg  650 mg Oral Q4H PRN Pranay Ramírez MD        Or    acetaminophen (TYLENOL) suppository 650 mg  650 mg Rectal Q4H PRN Pranay Ramírez MD        arformoterol (BROVANA) nebulizer solution 15 mcg  15 mcg Nebulization BID - RT Pranay Ramírez MD        aspirin chewable tablet 324 mg  324 mg Oral Once Pranay Ramírez MD         azithromycin (ZITHROMAX) 500 mg in sodium chloride 0.9 % 250 mL IVPB-VTB  500 mg Intravenous Q24H Pranay Ramírez MD   Stopped at 06/05/25 1707    budesonide (PULMICORT) nebulizer solution 0.5 mg  0.5 mg Nebulization BID - RT Pranay Ramírez MD        dextrose (D50W) (25 g/50 mL) IV injection 25 g  25 g Intravenous Q15 Min PRN Pranay Ramírez MD        dextrose (GLUTOSE) oral gel 15 g  15 g Oral Q15 Min PRN Pranay Ramírez MD        enoxaparin sodium (LOVENOX) syringe 40 mg  40 mg Subcutaneous Daily Pranay Ramírez MD   40 mg at 06/05/25 1757    glucagon (GLUCAGEN) injection 1 mg  1 mg Intramuscular Q15 Min PRN Pranay Ramírez MD        hydrOXYzine (ATARAX) tablet 25 mg  25 mg Oral TID PRN Pranay Ramírez MD        Insulin Lispro (humaLOG) injection 2-7 Units  2-7 Units Subcutaneous 4x Daily AC & at Bedtime Pranay Ramírez MD   6 Units at 06/05/25 1803    ipratropium-albuterol (DUO-NEB) nebulizer solution 3 mL  3 mL Nebulization Q4H - RT Pranay Ramírez MD        ketorolac (TORADOL) injection 15 mg  15 mg Intravenous Q6H PRN Pranay Ramírez MD        naloxone (NARCAN) injection 0.8 mg  0.8 mg Intravenous Once Pranay Ramírez MD        [START ON 6/6/2025] predniSONE (DELTASONE) tablet 40 mg  40 mg Oral Daily With Breakfast Pranay Ramírez MD        sodium chloride 0.9 % flush 10 mL  10 mL Intravenous PRN Pranay Ramírez MD        sodium chloride 0.9 % flush 10 mL  10 mL Intravenous Q12H Pranay Ramírez MD        sodium chloride 0.9 % flush 10 mL  10 mL Intravenous PRN Pranay Ramírez MD        sodium chloride 0.9 % infusion 40 mL  40 mL Intravenous PRN Pranay Ramírez MD         Lab Results (last 24 hours)       Procedure Component Value Units Date/Time    POC Glucose 4x Daily Before Meals & at Bedtime [[id number]]  (Abnormal) Collected: 06/05/25 1746    Specimen: Blood Updated: 06/05/25 1749     Glucose 372 mg/dL      Comment: Serial Number: [id number]:  [id number]       C-reactive Protein [[id number]]  (Abnormal) Collected: 06/05/25 1251    Specimen: Blood Updated:  06/05/25 1742     C-Reactive Protein 5.95 mg/dL     Legionella Antigen, Urine - Urine, Straight Cath [[id number]]  (Normal) Collected: 06/05/25 1343    Specimen: Urine from Straight Cath Updated: 06/05/25 1626     LEGIONELLA ANTIGEN, URINE Negative    Mycoplasma Pneumoniae Antibody, IgM - Blood, [[id number]]  (Normal) Collected: 06/05/25 1106    Specimen: Blood Updated: 06/05/25 1619     Mycoplasma pneumo IgM Negative    Urinalysis, Microscopic Only - Straight Cath [[id number]] Collected: 06/05/25 1343    Specimen: Urine from Straight Cath Updated: 06/05/25 1559     RBC, UA 0-2 /HPF      WBC, UA 0-2 /HPF      Comment: Urine culture not indicated.        Bacteria, UA None Seen /HPF      Squamous Epithelial Cells, UA 0-2 /HPF      Hyaline Casts, UA None Seen /LPF      Calcium Oxalate Crystals, UA Small/1+ /HPF      Methodology Manual Light Microscopy    S. Pneumo Ag Urine or CSF - Urine, Straight Cath [[id number]]  (Normal) Collected: 06/05/25 1343    Specimen: Urine from Straight Cath Updated: 06/05/25 1558     Strep Pneumo Ag Negative    POC Glucose Once [[id number]]  (Abnormal) Collected: 06/05/25 1552    Specimen: Blood Updated: 06/05/25 1554     Glucose 341 mg/dL      Comment: Serial Number: [id number]:  [id number]       Procalcitonin [[id number]]  (Normal) Collected: 06/05/25 1251    Specimen: Blood Updated: 06/05/25 1548     Procalcitonin 0.12 ng/mL     Narrative:      As a Marker for Sepsis (Non-Neonates):    1. <0.5 ng/mL represents a low risk of severe sepsis and/or septic shock.  2. >2 ng/mL represents a high risk of severe sepsis and/or septic shock.    As a Marker for Lower Respiratory Tract Infections that require antibiotic therapy:    PCT on Admission    Antibiotic Therapy       6-12 Hrs later    >0.5                Strongly Recommended  >0.25 - <0.5        Recommended  0.1 - 0.25          Discouraged              Remeasure/reassess PCT  <0.1                Strongly Discouraged     Remeasure/reassess  "PCT    As 28 day mortality risk marker: \"Change in Procalcitonin Result\" (>80% or <=80%) if Day 0 (or Day 1) and Day 4 values are available. Refer to http://www.[facility]-pct-calculator.com    Change in PCT <=80%  A decrease of PCT levels below or equal to 80% defines a positive change in PCT test result representing a higher risk for 28-day all-cause mortality of patients diagnosed with severe sepsis for septic shock.    Change in PCT >80%  A decrease of PCT levels of more than 80% defines a negative change in PCT result representing a lower risk for 28-day all-cause mortality of patients diagnosed with severe sepsis or septic shock.    This test is Prognostic not Diagnostic, if elevated correlate with clinical findings before administering antibiotic treatment.        Urinalysis With Culture If Indicated - Straight Cath [[id number]]  (Abnormal) Collected: 06/05/25 1343    Specimen: Urine from Straight Cath Updated: 06/05/25 1544     Color, UA Dark Yellow     Appearance, UA Clear     pH, UA 6.0     Specific Gravity, UA >1.030     Glucose, UA >=1000 mg/dL (3+)     Ketones, UA Trace     Bilirubin, UA Negative     Blood, UA Negative     Protein, UA 30 mg/dL (1+)     Leuk Esterase, UA Negative     Nitrite, UA Negative     Urobilinogen, UA 1.0 E.U./dL    Narrative:      In absence of clinical symptoms, the presence of pyuria, bacteria, and/or nitrites on the urinalysis result does not correlate with infection.    POC Glucose Once [[id number]]  (Abnormal) Collected: 06/05/25 1421    Specimen: Arterial Blood Updated: 06/05/25 1427     Glucose 357 mg/dL      Comment: Serial Number: [id number]:  [id number]       Blood Gas, Arterial - [[id number]]  (Abnormal) Collected: 06/05/25 1421    Specimen: Arterial Blood Updated: 06/05/25 1427     Site Right Radial     Shane's Test Positive     pH, Arterial 7.320 pH units      pCO2, Arterial 71.5 mm Hg      pO2, Arterial 50.6 mm Hg      HCO3, Arterial 36.8 mmol/L      Base Excess, Arterial " 7.6 mmol/L      Comment: Serial Number: [id number]:  [id number]        O2 Saturation, Arterial 80.4 %      Hemoglobin, Blood Gas 15.2 g/dL      Hematocrit, Blood Gas 45.0 %      Barometric Pressure for Blood Gas 743.6000 mmHg      Modality Cannula     FIO2 32 %      Flow Rate 3.0000 lpm      Notified Who Naeem     Read Back Yes     Notified Time --     Hemodilution No     PO2/FIO2 158    POC Lactate [[id number]]  (Normal) Collected: 06/05/25 1421    Specimen: Arterial Blood Updated: 06/05/25 1427     Lactate 0.8 mmol/L      Comment: Serial Number: [id number]:  [id number]       POC Electrolyte Panel [[id number]]  (Abnormal) Collected: 06/05/25 1421    Specimen: Arterial Blood Updated: 06/05/25 1427     Sodium 142 mmol/L      POC Potassium 4.0 mmol/L      Chloride 93 mmol/L      Ionized Calcium 1.25 mmol/L      Comment: Serial Number: [id number]:  [id number]       Lactic Acid, Plasma [[id number]]  (Normal) Collected: 06/05/25 1402    Specimen: Blood Updated: 06/05/25 1426     Lactate 0.9 mmol/L     Blood Culture - Blood, Arm, Right [[id number]] Collected: 06/05/25 1402    Specimen: Blood from Arm, Right Updated: 06/05/25 1408    Blood Culture - Blood, Arm, Left [[id number]] Collected: 06/05/25 1402    Specimen: Blood from Arm, Left Updated: 06/05/25 1407    Fentanyl, Urine - Urine, Clean Catch [[id number]]  (Abnormal) Collected: 06/05/25 1342    Specimen: Urine, Clean Catch Updated: 06/05/25 1404     Fentanyl, Urine Positive    Narrative:      Negative Threshold:      Fentanyl 5 ng/mL     The normal value for the drug tested is negative. This report includes final unconfirmed screening results to be used for medical treatment purposes only. Unconfirmed results must not be used for non-medical purposes such as employment or legal testing. Clinical consideration should be applied to any drug of abuse test, particularly when unconfirmed results are used.           Urine Drug Screen - Urine, Clean Catch [[id number]]  (Abnormal)  Collected: 06/05/25 1342    Specimen: Urine, Clean Catch Updated: 06/05/25 1404     THC, Screen, Urine Negative     Phencyclidine (PCP), Urine Negative     Cocaine Screen, Urine Negative     Methamphetamine, Ur Negative     Opiate Screen Positive     Amphetamine Screen, Urine Negative     Benzodiazepine Screen, Urine Negative     Tricyclic Antidepressants Screen Negative     Methadone Screen, Urine Negative     Barbiturates Screen, Urine Negative     Oxycodone Screen, Urine Positive     Buprenorphine, Screen, Urine Negative    Narrative:      Cutoff For Drugs Screened:    Amphetamines               500 ng/ml  Barbiturates               200 ng/ml  Benzodiazepines            150 ng/ml  Cocaine                    150 ng/ml  Methadone                  200 ng/ml  Opiates                    100 ng/ml  Phencyclidine               25 ng/ml  THC                         50 ng/ml  Methamphetamine            500 ng/ml  Tricyclic Antidepressants  300 ng/ml  Oxycodone                  100 ng/ml  Buprenorphine               10 ng/ml    The normal value for all drugs tested is negative. This report includes unconfirmed screening results, with the cutoff values listed, to be used for medical treatment purposes only.  Unconfirmed results must not be used for non-medical purposes such as employment or legal testing.  Clinical consideration should be applied to any drug of abuse test, particularly when unconfirmed results are used.      High Sensitivity Troponin T 1Hr [[id number]]  (Abnormal) Collected: 06/05/25 1251    Specimen: Blood Updated: 06/05/25 1327     HS Troponin T 29 ng/L      Troponin T Numeric Delta -1 ng/L      Troponin T % Delta -3    Narrative:      High Sensitive Troponin T Reference Range:  <14.0 ng/L- Negative Female for AMI  <22.0 ng/L- Negative Male for AMI  >=14 - Abnormal Female indicating possible myocardial injury.  >=22 - Abnormal Male indicating possible myocardial injury.   Clinicians would have to utilize  clinical acumen, EKG, Troponin, and serial changes to determine if it is an Acute Myocardial Infarction or myocardial injury due to an underlying chronic condition.         Comprehensive Metabolic Panel [[id number]]  (Abnormal) Collected: 06/05/25 1106    Specimen: Blood Updated: 06/05/25 1133     Glucose 357 mg/dL      BUN 15.1 mg/dL      Creatinine 0.36 mg/dL      Sodium 136 mmol/L      Potassium 3.7 mmol/L      Chloride 97 mmol/L      CO2 30.6 mmol/L      Calcium 8.2 mg/dL      Total Protein 6.0 g/dL      Albumin 3.2 g/dL      ALT (SGPT) 9 U/L      AST (SGOT) 10 U/L      Alkaline Phosphatase 129 U/L      Total Bilirubin 0.3 mg/dL      Globulin 2.8 gm/dL      A/G Ratio 1.1 g/dL      BUN/Creatinine Ratio 41.9     Anion Gap 8.4 mmol/L      eGFR 117.1 mL/min/1.73     Narrative:      GFR Categories in Chronic Kidney Disease (CKD)              GFR Category          GFR (mL/min/1.73)    Interpretation  G1                    90 or greater        Normal or high (1)  G2                    60-89                Mild decrease (1)  G3a                   45-59                Mild to moderate decrease  G3b                   30-44                Moderate to severe decrease  G4                    15-29                Severe decrease  G5                    14 or less           Kidney failure    (1)In the absence of evidence of kidney disease, neither GFR category G1 or G2 fulfill the criteria for CKD.    eGFR calculation 2021 CKD-EPI creatinine equation, which does not include race as a factor    Lipase [[id number]]  (Abnormal) Collected: 06/05/25 1106    Specimen: Blood Updated: 06/05/25 1133     Lipase 66 U/L     Magnesium [[id number]]  (Abnormal) Collected: 06/05/25 1106    Specimen: Blood Updated: 06/05/25 1133     Magnesium 1.5 mg/dL     High Sensitivity Troponin T [[id number]]  (Abnormal) Collected: 06/05/25 1106    Specimen: Blood Updated: 06/05/25 1133     HS Troponin T 30 ng/L     Narrative:      High Sensitive Troponin T  Reference Range:  <14.0 ng/L- Negative Female for AMI  <22.0 ng/L- Negative Male for AMI  >=14 - Abnormal Female indicating possible myocardial injury.  >=22 - Abnormal Male indicating possible myocardial injury.   Clinicians would have to utilize clinical acumen, EKG, Troponin, and serial changes to determine if it is an Acute Myocardial Infarction or myocardial injury due to an underlying chronic condition.         BNP [[id number]]  (Normal) Collected: 06/05/25 1106    Specimen: Blood Updated: 06/05/25 1130     proBNP 488.5 pg/mL     Narrative:      This assay is used as an aid in the diagnosis of individuals suspected of having heart failure. It can be used as an aid in the diagnosis of acute decompensated heart failure (ADHF) in patients presenting with signs and symptoms of ADHF to the emergency department (ED). In addition, NT-proBNP of <300 pg/mL indicates ADHF is not likely.    Age Range Result Interpretation  NT-proBNP Concentration (pg/mL:      <50             Positive            >450                   Gray                 300-450                    Negative             <300    50-75           Positive            >900                  Gray                300-900                  Negative            <300      >75             Positive            >1800                  Gray                300-1800                  Negative            <300    POC Glucose Once [[id number]]  (Abnormal) Collected: 06/05/25 1119    Specimen: Blood Updated: 06/05/25 1121     Glucose 362 mg/dL      Comment: Serial Number: [id number]:  [id number]       [place] Draw [[id number]] Collected: 06/05/25 1106    Specimen: Blood Updated: 06/05/25 1115    Narrative:      The following orders were created for panel order [place] Draw.  Procedure                               Abnormality         Status                     ---------                               -----------         ------                     Green Top (Gel)[[id number]]                                   Final result               Lavender Top[[id number]]                                     Final result               Gold Top - SST[[id number]]                                   Final result               Light Blue Top[[id number]]                                   Final result                 Please view results for these tests on the individual orders.    Lavender Top [[id number]] Collected: 06/05/25 1106    Specimen: Blood Updated: 06/05/25 1115     Extra Tube hold for add-on     Comment: Auto resulted       Gold Top - SST [[id number]] Collected: 06/05/25 1106    Specimen: Blood Updated: 06/05/25 1115     Extra Tube Hold for add-ons.     Comment: Auto resulted.       Light Blue Top [[id number]] Collected: 06/05/25 1106    Specimen: Blood Updated: 06/05/25 1115     Extra Tube Hold for add-ons.     Comment: Auto resulted       Green Top (Gel) [[id number]] Collected: 06/05/25 1106    Specimen: Blood Updated: 06/05/25 1115     Extra Tube Hold for add-ons.     Comment: Auto resulted.       CBC & Differential [[id number]]  (Abnormal) Collected: 06/05/25 1106    Specimen: Blood Updated: 06/05/25 1113    Narrative:      The following orders were created for panel order CBC & Differential.  Procedure                               Abnormality         Status                     ---------                               -----------         ------                     CBC Auto Differential[[id number]]        Abnormal            Final result                 Please view results for these tests on the individual orders.    CBC Auto Differential [[id number]]  (Abnormal) Collected: 06/05/25 1106    Specimen: Blood Updated: 06/05/25 1113     WBC 7.91 10*3/mm3      RBC 4.81 10*6/mm3      Hemoglobin 12.2 g/dL      Hematocrit 41.2 %      MCV 85.7 fL      MCH 25.4 pg      MCHC 29.6 g/dL      RDW 14.9 %      RDW-SD 46.5 fl      MPV 10.7 fL      Platelets 150 10*3/mm3      Neutrophil % 84.2 %      Lymphocyte % 7.6 %       Monocyte % 6.1 %      Eosinophil % 0.4 %      Basophil % 0.3 %      Immature Grans % 1.4 %      Neutrophils, Absolute 6.67 10*3/mm3      Lymphocytes, Absolute 0.60 10*3/mm3      Monocytes, Absolute 0.48 10*3/mm3      Eosinophils, Absolute 0.03 10*3/mm3      Basophils, Absolute 0.02 10*3/mm3      Immature Grans, Absolute 0.11 10*3/mm3      nRBC 0.0 /100 WBC           Imaging Results (Last 24 Hours)       Procedure Component Value Units Date/Time    XR Chest 1 View [[id number]] Collected: 06/05/25 1105     Updated: 06/05/25 1109    Narrative:      XR CHEST 1 VW    Date of Exam: 6/5/2025 10:43 AM EDT    Indication: Chest Pain Triage Protocol    Comparison: Chest x-ray 5/27/2025    Findings:  The heart is stable in size. There is a left chest wall port catheter with the distal tip overlying the distal superior vena cava. Aortic vascular stent is present. There is a calcified granuloma at the right lung base. No evidence for pneumothorax or   pleural effusion. Surgical clips are noted in the left axillary region.      Impression:      Impression:  Stable chest examination without acute cardiopulmonary process.      Electronically Signed: Mikayla Linares MD    6/5/2025 11:06 AM EDT    Workstation ID: [id number]          Orders (last 24 hrs)        Start     Ordered    06/06/25 0800  predniSONE (DELTASONE) tablet 40 mg  Daily With Breakfast         06/05/25 1519    06/06/25 0600  CBC Auto Differential  Morning Draw         06/05/25 1743    06/06/25 0600  Comprehensive Metabolic Panel  Morning Draw         06/05/25 1743    06/06/25 0600  Procalcitonin  Morning Draw         06/05/25 1743    06/06/25 0600  Magnesium  Morning Draw         06/05/25 1743    06/05/25 2130  arformoterol (BROVANA) nebulizer solution 15 mcg  2 Times Daily - RT         06/05/25 1516    06/05/25 2130  budesonide (PULMICORT) nebulizer solution 0.5 mg  2 Times Daily - RT         06/05/25 1516    06/05/25 2100  sodium chloride 0.9 % flush 10 mL  Every 12  "Hours Scheduled         06/05/25 1743    06/05/25 2000  Vital Signs  Every 4 Hours       06/05/25 1743    06/05/25 1830  enoxaparin sodium (LOVENOX) syringe 40 mg  Daily         06/05/25 1743    06/05/25 1800  Oral Care  2 Times Daily       06/05/25 1743    06/05/25 1800  Strict Intake & Output  Every Hour       06/05/25 1743    06/05/25 1744  Intake & Output  Every Shift       06/05/25 1743    06/05/25 1744  Weigh Patient  Once         06/05/25 1743    06/05/25 1744  Insert Peripheral IV  Once         06/05/25 1743    06/05/25 1744  Saline Lock & Maintain IV Access  Continuous,   Status:  Canceled         06/05/25 1743    06/05/25 1744  Continuous Cardiac Monitoring  Continuous        Comments: Follow Standing Orders As Outlined in Process Instructions (Open Order Report to View Full Instructions)    06/05/25 1743    06/05/25 1744  Maintain IV Access  Continuous         06/05/25 1743    06/05/25 1744  Telemetry - Place Orders & Notify Provider of Results When Patient Experiences Acute Chest Pain, Dysrhythmia or Respiratory Distress  Continuous        Comments: Open Order Report to View Parameters Requiring Provider Notification    06/05/25 1743    06/05/25 1744  Daily Weights  Daily       06/05/25 1743    06/05/25 1744  Diet: Cardiac, Diabetic; Healthy Heart (2-3 Na+); Consistent Carbohydrate; Fluid Consistency: Thin (IDDSI 0)  Diet Effective Now         06/05/25 1743    06/05/25 1744  Bowel Regimen Not Indicated  Once         06/05/25 1743    06/05/25 1743  sodium chloride 0.9 % flush 10 mL  As Needed         06/05/25 1743    06/05/25 1743  sodium chloride 0.9 % infusion 40 mL  As Needed         06/05/25 1743    06/05/25 1743  acetaminophen (TYLENOL) tablet 650 mg  Every 4 Hours PRN        Placed in \"Or\" Linked Group    06/05/25 1743    06/05/25 1743  acetaminophen (TYLENOL) 160 MG/5ML oral solution 650 mg  Every 4 Hours PRN        Placed in \"Or\" Linked Group    06/05/25 1743    06/05/25 1743  acetaminophen " "(TYLENOL) suppository 650 mg  Every 4 Hours PRN        Placed in \"Or\" Linked Group    06/05/25 1743    06/05/25 1730  Insulin Lispro (humaLOG) injection 2-7 Units  4 Times Daily Before Meals & Nightly         06/05/25 1519    06/05/25 1730  acetaminophen (OFIRMEV) injection 1,000 mg  Once         06/05/25 1711    06/05/25 1723  hydrOXYzine (ATARAX) tablet 25 mg  3 Times Daily PRN         06/05/25 1723    06/05/25 1722  Inpatient Palliative Care Team Consult  Once        Comments: Patient has been very unfortunate with multiple cancers.  Pain is difficult control at this point due to significant opioid use in the past now was threatening her respiratory status.   Provider:  (Not yet assigned)    06/05/25 1722    06/05/25 1721  C-reactive Protein  Once         06/05/25 1721    06/05/25 1720  Wound Ostomy Eval & Treat  Once         06/05/25 1720    06/05/25 1719  Discontinue Insulin Infusion at Specified Time After Basal Dose Administered  Once        Comments: Discontinue Insulin Infusion at Specified Time After Basal Dose Administered    06/05/25 1519    06/05/25 1719  Discontinue Glucommander IV Insulin Order Set After Transition Complete  Once        Comments: Discontinue Glucommander IV Insulin Order Set After Transition Complete    06/05/25 1519    06/05/25 1706  ketorolac (TORADOL) injection 15 mg  Every 6 Hours PRN         06/05/25 1707    06/05/25 1700  POC Glucose 4x Daily Before Meals & at Bedtime  4 Times Daily Before Meals & at Bedtime      Comments: Complete no more than 45 minutes prior to patient eating      06/05/25 1519    06/05/25 1555  POC Glucose Once  PROCEDURE ONCE        Comments: Complete no more than 45 minutes prior to patient eating      06/05/25 1552    06/05/25 1545  ipratropium-albuterol (DUO-NEB) nebulizer solution 3 mL  Every 4 Hours - RT         06/05/25 1516    06/05/25 1545  azithromycin (ZITHROMAX) 500 mg in sodium chloride 0.9 % 250 mL IVPB-VTB  Every 24 Hours         06/05/25 1516 " "   06/05/25 1534  Urinalysis, Microscopic Only - Straight Cath  Once         06/05/25 1533    06/05/25 1532  Mycoplasma Pneumoniae Antibody, IgM - Blood,  Once         06/05/25 1531    06/05/25 1532  Respiratory Culture - Sputum, Cough  Once         06/05/25 1531    06/05/25 1530  naloxone (NARCAN) injection 0.8 mg  Once         06/05/25 1510    06/05/25 1520  Procalcitonin  STAT         06/05/25 1519    06/05/25 1520  S. Pneumo Ag Urine or CSF - Urine, Straight Cath  Once         06/05/25 1531    06/05/25 1520  Legionella Antigen, Urine - Urine, Straight Cath  Once         06/05/25 1531    06/05/25 1519  Discontinue Glucommander After Transition Complete  Once        Comments: Discontinue Glucommander After Transition Complete  - Click \"Discontinue IV\"  - Click \"Confirm\"    06/05/25 1519    06/05/25 1518  dextrose (GLUTOSE) oral gel 15 g  Every 15 Minutes PRN         06/05/25 1519    06/05/25 1518  dextrose (D50W) (25 g/50 mL) IV injection 25 g  Every 15 Minutes PRN         06/05/25 1519    06/05/25 1518  glucagon (GLUCAGEN) injection 1 mg  Every 15 Minutes PRN         06/05/25 1519    06/05/25 1513  Inpatient Admission  Once         06/05/25 1516    06/05/25 1513  Code Status and Medical Interventions: CPR (Attempt to Resuscitate); Full Support  Continuous         06/05/25 1516    06/05/25 1504  NIPPV (CPAP or BIPAP)  Until Discontinued         06/05/25 1503    06/05/25 1439  NIPPV (CPAP or BIPAP)  Until Discontinued,   Status:  Canceled         06/05/25 1438    06/05/25 1429  Hospitalist (on-call MD unless specified)  Once        Specialty:  Hospitalist  Provider:  Pranay Ramírez MD    06/05/25 1429    06/05/25 1428  POC Glucose Once  PROCEDURE ONCE        Comments: Complete no more than 45 minutes prior to patient eating      06/05/25 1421    06/05/25 1428  Blood Gas, Arterial -  PROCEDURE ONCE         06/05/25 1421    06/05/25 1428  POC Lactate  PROCEDURE ONCE         06/05/25 1421    06/05/25 1428  POC " Electrolyte Panel  PROCEDURE ONCE         06/05/25 1421    06/05/25 1428  IP General Consult (Use specialty-specific consult if known)  Once,   Status:  Canceled        Provider:  Teodoro Mancuso MD    06/05/25 1427    06/05/25 1350  Fentanyl, Urine - Urine, Clean Catch  Once         06/05/25 1349    06/05/25 1345  sodium chloride 0.9 % bolus 1,000 mL  Once         06/05/25 1326    06/05/25 1345  ipratropium-albuterol (DUO-NEB) nebulizer solution 3 mL  Every 5 Minutes         06/05/25 1326    06/05/25 1345  methylPREDNISolone sodium succinate (SOLU-Medrol) 125 mg in sterile water (preservative free) 2 mL  Once         06/05/25 1326    06/05/25 1329  Urine Drug Screen - Urine, Clean Catch  Once         06/05/25 1328    06/05/25 1327  Arterial Blood Gas,H&H,Lytes,Lactate  Once         06/05/25 1326    06/05/25 1326  Urinalysis With Culture If Indicated - Straight Cath  Once         06/05/25 1326    06/05/25 1326  Blood Culture - Blood, Arm, Right  STAT         06/05/25 1326    06/05/25 1326  Blood Culture - Blood, Arm, Left  STAT         06/05/25 1326    06/05/25 1326  Lactic Acid, Plasma  Once         06/05/25 1326    06/05/25 1206  High Sensitivity Troponin T 1Hr  PROCEDURE ONCE         06/05/25 1133    06/05/25 1122  POC Glucose Once  PROCEDURE ONCE        Comments: Complete no more than 45 minutes prior to patient eating      06/05/25 1119    06/05/25 1100  aspirin chewable tablet 324 mg  Once         06/05/25 1033    06/05/25 1034  NPO Diet NPO Type: Strict NPO  Diet Effective Now,   Status:  Canceled         06/05/25 1033    06/05/25 1034  Undress & Gown  Once         06/05/25 1033    06/05/25 1034  Cardiac Monitoring  Continuous,   Status:  Canceled        Comments: Follow Standing Orders As Outlined in Process Instructions (Open Order Report to View Full Instructions)    06/05/25 1033    06/05/25 1034  Continuous Pulse Oximetry  Continuous         06/05/25 1033    06/05/25 1034  ECG 12 Lead ED Triage Standing  Order; Chest Pain  Once         06/05/25 1033    06/05/25 1034  XR Chest 1 View  1 Time Imaging         06/05/25 1033    06/05/25 1034  Insert Peripheral IV  Once         06/05/25 1033    06/05/25 1034  [place] Draw  Once         06/05/25 1033    06/05/25 1034  High Sensitivity Troponin T  Once         06/05/25 1033    06/05/25 1034  CBC & Differential  Once         06/05/25 1033    06/05/25 1034  Comprehensive Metabolic Panel  Once         06/05/25 1033    06/05/25 1034  Lipase  Once         06/05/25 1033    06/05/25 1034  BNP  Once         06/05/25 1033    06/05/25 1034  Magnesium  Once         06/05/25 1033    06/05/25 1034  Green Top (Gel)  PROCEDURE ONCE         06/05/25 1033    06/05/25 1034  Lavender Top  PROCEDURE ONCE         06/05/25 1033    06/05/25 1034  Gold Top - SST  PROCEDURE ONCE         06/05/25 1033    06/05/25 1034  Light Blue Top  PROCEDURE ONCE         06/05/25 1033    06/05/25 1034  CBC Auto Differential  PROCEDURE ONCE         06/05/25 1033    06/05/25 1033  sodium chloride 0.9 % flush 10 mL  As Needed         06/05/25 1033    06/03/25 0000  oxyCODONE (ROXICODONE) 10 MG tablet  Every 4 to 6 Hours PRN         06/05/25 1556    Unscheduled  Oxygen Therapy- Nasal Cannula; Titrate 1-6 LPM Per SpO2; 90 - 95%  Continuous PRN       06/05/25 1033    Unscheduled  ECG 12 Lead ED Triage Standing Order; Chest Pain  As Needed      Comments: Persistent, Unrelieved or Recurrent Chest Pain    06/05/25 1033    Unscheduled  Follow Hypoglycemia Standing Orders For Blood Glucose <70 & Notify Provider of Treatment  As Needed      Comments: Follow Hypoglycemia Orders As Outlined in Process Instructions (Open Order Report to View Full Instructions)  Notify Provider Any Time Hypoglycemia Treatment is Administered    06/05/25 7395

## 2025-06-05 NOTE — ED NOTES
"Pt keeps trying to take her bipap off, informed pt that she needs it to breath properly, pt said \"this is bullshit\". RN advised if she doesn't not wear the bipap she will end up being on a ventilator. Patient requested daughter be called, spoke to daughter on the phone. ER MD and Attending MD notified.   "

## 2025-06-05 NOTE — ED PROVIDER NOTES
Time: 1:25 PM EDT  Date of encounter:  6/5/2025  Independent Historian/Clinical History and Information was obtained by:   Patient and EMS    History is limited by: N/A    Chief Complaint: Short of breath, weakness, chest discomfort, cough, bilateral leg pain swelling and redness      History of Present Illness:  Patient is a 59 y.o. year old female who presents to the emergency department for evaluation of shortness of breath, weakness, chest discomfort, cough, bilateral leg pain, swelling, and redness.  This patient has a history of multiple medical problems including COPD, congestive heart failure, chronic pain with intrathecal pump in place, aortic stenosis, status post valve replacement, pulmonary embolism.  The patient also was diagnosed with Hodgkin's lymphoma and medical records from [facility] indicate that she went to [facility] last month for chemotherapy ended up leaving [place] because she could not smoke.  The patient is normally not on oxygen at home and she presents today with increasing shortness of breath and the above symptoms.  The patient presents with hypotension and tachycardia and generalized weakness      Patient Care Team  Primary Care Provider: Virgil Salas MD    Past Medical History:     Allergies   Allergen Reactions    Amoxicillin Shortness Of Breath     Has tolerated Cefazolin, Ceftriaxone, and Cefepime -Jermaine Meldia, [facility]    Ceclor [Cefaclor] Shortness Of Breath     Has tolerated Cefazolin, Ceftriaxone, and Cefepime -Jermaine Melida, [facility]      Penicillins Shortness Of Breath     Has tolerated Cefazolin, Ceftriaxone, and Cefepime -Jermaine Melida, [facility]    Sulfa Antibiotics Rash    Valium [Diazepam] Mental Status Change     DEPRESSED    Ambien [Zolpidem] Mental Status Change    Aspirin GI Intolerance    Ativan [Lorazepam] Mental Status Change    Benadryl [Diphenhydramine] Anxiety     AND MAKES HER HYPER    Biaxin [Clarithromycin] Unknown - Low Severity    Cephalexin  Unknown - Low Severity    Clindamycin Unknown - Low Severity    Compazine [Prochlorperazine Edisylate] Rash    Contrast Dye (Echo Or Unknown Ct/Mr) Other (See Comments)     Caused pain in her arm    Doxycycline Rash    Nsaids GI Intolerance    Phenergan [Promethazine Hcl] GI Intolerance    Promethazine GI Intolerance    Vancomycin Itching     Past Medical History:   Diagnosis Date    Anesthesia     REPORTS HAS GOT REAL EMOTIONAL AND HAS BECOME ANGRY BEFORE    Anxiety     Aortic stenosis, severe     HAS BIO VALVE REPLACED    Arthritis     Asthma     Back pain     Blood clotting disorder     test to find out what type    Cancer     CHF (congestive heart failure)     Chronic low back pain with sciatica     Chronic pain disorder     COPD (chronic obstructive pulmonary disease)     Coronary artery disease     looking to follow with Cardio    Diabetes mellitus     TYPE 2    Dyspnea on effort     Fatty liver     GERD (gastroesophageal reflux disease)     H/O lumpectomy     H/O: hysterectomy     Hiatal hernia     History of degenerative disc disease     History of kidney stones     History of pulmonary embolus (PE)     History of transfusion     AS A CHILD NO TRANSFUSION REACTION    Hodgkin's lymphoma     5/19/23  5 YEARS SINCE LAST CHEMO TX    Hypertension     Immobility     Lupus     Nausea vomiting and diarrhea 03/07/2024    Panic disorder     Pelvic pain     Peripheral neuropathy     Personal history of DVT (deep vein thrombosis)     over 20 years    PONV (postoperative nausea and vomiting)     Presence of intrathecal pump     PTSD (post-traumatic stress disorder)     Sacroiliac joint disease     SI (sacroiliac) joint inflammation     Venous insufficiency      Past Surgical History:   Procedure Laterality Date    ABDOMINAL SURGERY      BACK SURGERY      SPINAL FUSION     BACK SURGERY      BLADDER REPAIR      BRONCHOSCOPY WITH ION ROBOTIC ASSIST N/A 4/22/2025    Procedure: BRONCHOSCOPY WITH ION ROBOT: REBUS, EBUS, NEEDLE  ASPIRATES, BIOPSIES, BRUSHINGS, BAL, WASHINGS: insertion of lighted robot navigated instrument to view inside the lung;  Surgeon: Enzo Segal MD;  Location: [facility] MAIN OR;  Service: Robotics - Pulmonary;  Laterality: N/A;    CARDIAC CATHETERIZATION N/A 03/06/2019    Procedure: Valvuloplasty;  Surgeon: Wayne Johnson MD;  Location: [facility] CATH INVASIVE LOCATION;  Service: Cardiology    CARDIAC CATHETERIZATION      CARDIAC CATHETERIZATION Left 05/31/2023    Procedure: Cardiac Catheterization/Vascular Study;  Surgeon: Poncho Reid MD;  Location: [facility] CATH INVASIVE LOCATION;  Service: Cardiovascular;  Laterality: Left;    CARDIAC SURGERY      Mechanical valve    CARDIAC VALVE REPLACEMENT  2021    CHOLECYSTECTOMY      COLONOSCOPY N/A 12/29/2021    Procedure: COLONOSCOPY;  Surgeon: Karine Orlando MD;  Location: [facility] ENDOSCOPY;  Service: Gastroenterology;  Laterality: N/A;  POOR PREP    COLONOSCOPY N/A 04/13/2022    Procedure: COLONOSCOPY WITH POLYPECTOMY;  Surgeon: Karine Orlando MD;  Location: [facility] ENDOSCOPY;  Service: Gastroenterology;  Laterality: N/A;  COLON POLYP, HEMORRHOIDS, POOR PREP    COLONOSCOPY      DIRECT LARYNGOSCOPY, ESOPHAGOSCOPY, BRONCHOSCOPY N/A 05/22/2023    Procedure: DIRECT LARYNGOSCOPY WITH BIOPSIES , ESOPHAGOSCOPY, BRONCHOSCOPY;  Surgeon: Ronnie Mcgarry MD;  Location: [facility] OR OSC;  Service: ENT;  Laterality: N/A;    ENDOSCOPY N/A 12/29/2021    Procedure: ESOPHAGOGASTRODUODENOSCOPY;  Surgeon: Karine Orlando MD;  Location: [facility] ENDOSCOPY;  Service: Gastroenterology;  Laterality: N/A;  ESOPHAGITIS, GASTRITIS, HIATAL HERNIA    ENDOSCOPY      ENDOSCOPY N/A 04/16/2024    Procedure: ESOPHAGOGASTRODUODENOSCOPY WITH BIOPSIES;  Surgeon: Karine Orlando MD;  Location: [facility] ENDOSCOPY;  Service: Gastroenterology;  Laterality: N/A;  ESOPHAGITIS, HIATAL HERNIA    ENDOSCOPY W/ PEG TUBE PLACEMENT N/A 12/10/2024    Procedure:  ESOPHAGOGASTRODUODENOSCOPY WITH PERCUTANEOUS ENDOSCOPIC GASTROSTOMY TUBE INSERTION WITH ANESTHESIA;  Surgeon: Rodger Ortega MD;  Location: [facility] ENDOSCOPY;  Service: Gastroenterology;  Laterality: N/A;  PEG TUBE INSERTION    HYSTERECTOMY      LYMPHADENECTOMY      PAIN PUMP INSERTION/REVISION N/A 10/18/2019    Procedure: PAIN PUMP INSERTION \[facility]\"" 10-18-19 [place];  Surgeon: Horace Rios MD;  Location: [facility] MAIN OR;  Service: Pain Management    PAIN PUMP INSERTION/REVISION N/A 11/23/2020    Procedure: pain pump removal;  Surgeon: Horace Rios MD;  Location: [facility] MAIN OR;  Service: Pain Management;  Laterality: N/A;    PEG TUBE INSERTION N/A 07/26/2023    Procedure: PERCUTANEOUS ENDOSCOPIC GASTROSTOMY TUBE INSERTION;  Surgeon: Kody Mercer MD;  Location: [facility] ENDOSCOPY;  Service: General;  Laterality: N/A;  SUCCESSFUL PEG TUBE PLACE PLACEMENT    PORTACATH PLACEMENT      IN LEFT CHEST REPORTS THAT IT IS NOT FUNCTIONING    SKIN BIOPSY      TONSILLECTOMY      TUBAL ABDOMINAL LIGATION      TUMOR REMOVAL      vaginal /anal area     Family History   Problem Relation Age of Onset    Heart failure Mother     Anxiety disorder Mother     Prostate cancer Father     Depression Sister     Bipolar disorder Sister     Pancreatic cancer Sister     ADD / ADHD Brother     Thyroid cancer Maternal Grandmother     Pancreatic cancer Paternal Grandmother     ADD / ADHD Grandson     ADD / ADHD Granddaughter     ADD / ADHD Nephew     Malig Hyperthermia Neg Hx        Home Medications:  Prior to Admission medications    Medication Sig Start Date End Date Taking? Authorizing Provider   acetaminophen (Tylenol) 325 MG tablet Take 1 tablet by mouth Every 6 (Six) Hours As Needed for Mild Pain, Headache or Fever.    Provider, MD Nette   albuterol sulfate  (90 Base) MCG/ACT inhaler Inhale 2 puffs Every 4 (Four) Hours As Needed for Shortness of Air. 5/13/25   ProviderNette MD   ALPRAZolam (XANAX)  0.25 MG tablet Take 1 tablet by mouth 3 times a day. 11/25/24   Nette Jiménez MD   budesonide (Pulmicort) 0.5 MG/2ML nebulizer solution Take 2 mL by nebulization 2 (Two) Times a Day for 30 days. Rinse mouth out after each use 4/9/25 5/9/25  Xochitl Estrada APRN   famotidine (Pepcid) 40 MG tablet Take 1 tablet by mouth every night at bedtime. 3/27/25   Sobeida Espinosa APRN   furosemide (LASIX) 20 MG tablet Take 1 tablet by mouth Daily.    Nette Jiménez MD   HYDROcodone-acetaminophen (NORCO)  MG per tablet Take 1 tablet by mouth Every 4 (Four) Hours As Needed for Severe Pain. 5/13/25   Nette Jiménez MD   Insulin Lispro, 1 Unit Dial, (HUMALOG) 100 UNIT/ML solution pen-injector Inject 14 Units under the skin into the appropriate area as directed Daily. 2/14/25   Nette Jiménez MD   ipratropium-albuterol (DUO-NEB) 0.5-2.5 mg/3 ml nebulizer Take 3 mL by nebulization 4 (Four) Times a Day As Needed for Wheezing for up to 30 days. 4/9/25 5/9/25  Xochitl Estrada APRN   loperamide (IMODIUM) 2 MG capsule Take 1 capsule by mouth 4 (Four) Times a Day As Needed for Diarrhea (2 with 1st episode of diarrhea and 1 with each additonal episode, max of 8 per day.). 4/21/25   Sobeida Espinosa APRN   metoprolol succinate XL (TOPROL-XL) 25 MG 24 hr tablet Take 1 tablet by mouth every night at bedtime. 4/17/25   Fredis Chris MD   mupirocin (BACTROBAN) 2 % ointment Apply 1 Application topically to the appropriate area as directed 3 (Three) Times a Day. 4/10/24   Katia Wright MD   ondansetron ODT (ZOFRAN-ODT) 8 MG disintegrating tablet Place 1 tablet on the tongue Daily As Needed. 3/27/25   Nette Jiménez MD   pain patient supplied pump by Intrathecal route Continuous. Type of Medication: Hydromorphone 15 mg/ml and Bupivacaine 0.5 mg/ml  [employer] [phone]  Provider:Dr. Boudreaux  Provider number: [phone]  Basal Dose: Hydromorphone 7.518 mg/day Bupivacaine 0.75399  mg/day  Pump capacity: 40ml  ( MAX of Hydromorphone 9.456 mg/ day; Bupivacaine 0.58202 mg /day)  Bolus Dose:Hydromorphone 0.500 mg, Bupivacaine 0.71952 mg  Max activations: 4 per day  Lockout 3 hours. 1 Bolus per every 3 hours   Last refill-  4/24/2025  Alarm date- 6/23/2025  Pump manufacture:MedManny Escobar MD   PARoxetine (PAXIL) 20 MG tablet Take 1 tablet by mouth Every Morning. 3/27/25   Nette Jiménez MD   predniSONE (DELTASONE) 5 MG tablet Take 3 tablets by mouth 2 (Two) Times a Day. 15mg in am 15mg in pm 5/5/25   Virgil Navarro MD   Systane 0.4-0.3 % solution ophthalmic solution (artificial tears) Administer 1-2 drops to both eyes 4 (Four) Times a Day As Needed. 1/31/25   Nette Jiménez MD        Social History:   Social History     Tobacco Use    Smoking status: Every Day     Current packs/day: 2.00     Average packs/day: 2.0 packs/day for 46.4 years (92.8 ttl pk-yrs)     Types: Cigarettes     Start date: 1979     Passive exposure: Current    Smokeless tobacco: Never    Tobacco comments:     Currently smoking 1.5 daily. 04--js   Vaping Use    Vaping status: Never Used   Substance Use Topics    Alcohol use: Never    Drug use: Never         Review of Systems:  Review of Systems   Constitutional:  Positive for activity change, appetite change, chills and fatigue. Negative for fever.   HENT:  Negative for congestion, ear pain and sore throat.    Eyes:  Negative for pain.   Respiratory:  Positive for cough and shortness of breath. Negative for chest tightness.    Cardiovascular:  Negative for chest pain.   Gastrointestinal:  Negative for abdominal pain, diarrhea, nausea and vomiting.   Genitourinary:  Negative for flank pain and hematuria.   Musculoskeletal:  Negative for joint swelling.   Skin:  Positive for rash. Negative for pallor.   Neurological:  Positive for weakness. Negative for seizures and headaches.   All other systems reviewed and are negative.       Physical  "Exam:  BP 90/64 (BP Location: Right arm, Patient Position: Lying)   Pulse 94   Temp 97.7 °F (36.5 °C) (Oral)   Resp 20   Ht 167.6 cm (65.98\")   Wt 53.5 kg (117 lb 15.1 oz)   LMP  (LMP Unknown)   SpO2 98%   BMI 19.05 kg/m²     Physical Exam  Vitals and nursing note reviewed.   Constitutional:       General: She is not in acute distress.     Appearance: Normal appearance. She is ill-appearing. She is not toxic-appearing.      Comments: The patient is lethargic and ill-appearing   HENT:      Head: Normocephalic and atraumatic.      Mouth/Throat:      Mouth: Mucous membranes are moist.   Eyes:      General: No scleral icterus.     Extraocular Movements: Extraocular movements intact.      Pupils: Pupils are equal, round, and reactive to light.   Cardiovascular:      Rate and Rhythm: Normal rate and regular rhythm.      Pulses: Normal pulses.      Heart sounds: Normal heart sounds.   Pulmonary:      Effort: Pulmonary effort is normal. No respiratory distress.      Breath sounds: Examination of the right-middle field reveals wheezing and rhonchi. Examination of the left-middle field reveals wheezing and rhonchi. Wheezing and rhonchi present.   Abdominal:      General: Abdomen is flat.      Palpations: Abdomen is soft.      Tenderness: There is no abdominal tenderness.   Musculoskeletal:         General: Normal range of motion.      Cervical back: Normal range of motion and neck supple.      Right lower leg: Edema present.      Left lower leg: Edema present.   Skin:     General: Skin is warm and dry.      Capillary Refill: Capillary refill takes less than 2 seconds.      Comments: There is diffuse erythema with pressure ulcers noted bilaterally to the legs   Neurological:      Mental Status: Mental status is at baseline.      Comments: Generalized weakness and lethargy however the patient when awake and is oriented                    Medical Decision Making:      Comorbidities that affect care:    Cancer, " COPD    External Notes reviewed:    Previous Admission Note: Admission at [facility] for chemotherapy evaluation-the patient left AGAINST MEDICAL ADVICE.      The following orders were placed and all results were independently analyzed by me:  Orders Placed This Encounter   Procedures    Blood Culture - Blood,    Blood Culture - Blood,    S. Pneumo Ag Urine or CSF - Urine, Urine, Clean Catch    Legionella Antigen, Urine - Urine, Urine, Clean Catch    Mycoplasma Pneumoniae Antibody, IgM - Blood, Arm, Left    Respiratory Culture - Sputum, Cough    XR Chest 1 View    [place] Draw    High Sensitivity Troponin T    Comprehensive Metabolic Panel    Lipase    BNP    Magnesium    CBC Auto Differential    High Sensitivity Troponin T 1Hr    Urinalysis With Culture If Indicated - Straight Cath    Lactic Acid, Plasma    Arterial Blood Gas,H&H,Lytes,Lactate    Urine Drug Screen - Urine, Clean Catch    Fentanyl, Urine - Urine, Clean Catch    Blood Gas, Arterial -    CBC Auto Differential    Comprehensive Metabolic Panel    Procalcitonin    Magnesium    Procalcitonin    Urinalysis, Microscopic Only - Urine, Clean Catch    C-reactive Protein    Diet: Cardiac, Diabetic; Healthy Heart (2-3 Na+); Consistent Carbohydrate; Fluid Consistency: Thin (IDDSI 0)    Undress & Gown    Continuous Pulse Oximetry    Vital Signs    Intake & Output    Weigh Patient    Oral Care    Maintain IV Access    Telemetry - Place Orders & Notify Provider of Results When Patient Experiences Acute Chest Pain, Dysrhythmia or Respiratory Distress    Daily Weights    Strict Intake & Output    Discontinue Insulin Infusion at Specified Time After Basal Dose Administered    Discontinue Glucommander IV Insulin Order Set After Transition Complete    Discontinue Glucommander After Transition Complete    Code Status and Medical Interventions: CPR (Attempt to Resuscitate); Full Support    Hospitalist (on-call MD unless specified)    Inpatient Palliative Care Team  Consult    Inpatient Nutrition Consult    Oxygen Therapy- Nasal Cannula; Titrate 1-6 LPM Per SpO2; 90 - 95%    NIPPV (CPAP or BIPAP)    POC Glucose Once    POC Glucose Once    POC Lactate    POC Electrolyte Panel    POC Glucose 4x Daily Before Meals & at Bedtime    POC Glucose Once    POC Glucose Once    ECG 12 Lead ED Triage Standing Order; Chest Pain    ECG 12 Lead ED Triage Standing Order; Chest Pain    Wound Ostomy Eval & Treat    Insert Peripheral IV    Insert Peripheral IV    Inpatient Admission    CBC & Differential    Green Top (Gel)    Lavender Top    Gold Top - SST    Light Blue Top       Medications Given in the Emergency Department:  Medications   sodium chloride 0.9 % flush 10 mL (has no administration in time range)   aspirin chewable tablet 324 mg (324 mg Oral Not Given 6/5/25 1038)   naloxone (NARCAN) injection 0.8 mg (0.8 mg Intravenous Not Given 6/5/25 1517)   sodium chloride 0.9 % flush 10 mL (10 mL Intravenous Given 6/5/25 2051)   sodium chloride 0.9 % flush 10 mL (has no administration in time range)   sodium chloride 0.9 % infusion 40 mL (has no administration in time range)   enoxaparin sodium (LOVENOX) syringe 40 mg (40 mg Subcutaneous Given 6/5/25 1757)   acetaminophen (TYLENOL) tablet 650 mg (has no administration in time range)     Or   acetaminophen (TYLENOL) 160 MG/5ML oral solution 650 mg (has no administration in time range)     Or   acetaminophen (TYLENOL) suppository 650 mg (has no administration in time range)   arformoterol (BROVANA) nebulizer solution 15 mcg ( Nebulization Canceled Entry 6/5/25 2130)   budesonide (PULMICORT) nebulizer solution 0.5 mg ( Nebulization Canceled Entry 6/5/25 2130)   ipratropium-albuterol (DUO-NEB) nebulizer solution 3 mL (3 mL Nebulization Given 6/5/25 1847)   azithromycin (ZITHROMAX) 500 mg in sodium chloride 0.9 % 250 mL IVPB-VTB (0 mg Intravenous Stopped 6/5/25 1707)   dextrose (GLUTOSE) oral gel 15 g (has no administration in time range)   dextrose  (D50W) (25 g/50 mL) IV injection 25 g (has no administration in time range)   glucagon (GLUCAGEN) injection 1 mg (has no administration in time range)   Insulin Lispro (humaLOG) injection 2-7 Units (4 Units Subcutaneous Given 6/5/25 2050)   predniSONE (DELTASONE) tablet 40 mg (has no administration in time range)   ketorolac (TORADOL) injection 15 mg (has no administration in time range)   hydrOXYzine (ATARAX) tablet 25 mg (has no administration in time range)   sodium chloride 0.9 % bolus 1,000 mL (1,000 mL Intravenous New Bag 6/5/25 1343)   ipratropium-albuterol (DUO-NEB) nebulizer solution 3 mL (3 mL Nebulization Given 6/5/25 1411)   methylPREDNISolone sodium succinate (SOLU-Medrol) 125 mg in sterile water (preservative free) 2 mL (125 mg Intravenous Given 6/5/25 1344)   acetaminophen (OFIRMEV) injection 1,000 mg (1,000 mg Intravenous Given 6/5/25 1757)        ED Course:       EKG: Sinus rhythm with rate of 109 bpm left bundle branch block  No acute ischemic changes were noted.      Labs:    Lab Results (last 24 hours)       Procedure Component Value Units Date/Time    High Sensitivity Troponin T [[id number]]  (Abnormal) Collected: 06/05/25 1106    Specimen: Blood Updated: 06/05/25 1133     HS Troponin T 30 ng/L     Narrative:      High Sensitive Troponin T Reference Range:  <14.0 ng/L- Negative Female for AMI  <22.0 ng/L- Negative Male for AMI  >=14 - Abnormal Female indicating possible myocardial injury.  >=22 - Abnormal Male indicating possible myocardial injury.   Clinicians would have to utilize clinical acumen, EKG, Troponin, and serial changes to determine if it is an Acute Myocardial Infarction or myocardial injury due to an underlying chronic condition.         CBC & Differential [[id number]]  (Abnormal) Collected: 06/05/25 1106    Specimen: Blood Updated: 06/05/25 1113    Narrative:      The following orders were created for panel order CBC & Differential.  Procedure                                Abnormality         Status                     ---------                               -----------         ------                     CBC Auto Differential[[id number]]        Abnormal            Final result                 Please view results for these tests on the individual orders.    Comprehensive Metabolic Panel [[id number]]  (Abnormal) Collected: 06/05/25 1106    Specimen: Blood Updated: 06/05/25 1133     Glucose 357 mg/dL      BUN 15.1 mg/dL      Creatinine 0.36 mg/dL      Sodium 136 mmol/L      Potassium 3.7 mmol/L      Chloride 97 mmol/L      CO2 30.6 mmol/L      Calcium 8.2 mg/dL      Total Protein 6.0 g/dL      Albumin 3.2 g/dL      ALT (SGPT) 9 U/L      AST (SGOT) 10 U/L      Alkaline Phosphatase 129 U/L      Total Bilirubin 0.3 mg/dL      Globulin 2.8 gm/dL      A/G Ratio 1.1 g/dL      BUN/Creatinine Ratio 41.9     Anion Gap 8.4 mmol/L      eGFR 117.1 mL/min/1.73     Narrative:      GFR Categories in Chronic Kidney Disease (CKD)              GFR Category          GFR (mL/min/1.73)    Interpretation  G1                    90 or greater        Normal or high (1)  G2                    60-89                Mild decrease (1)  G3a                   45-59                Mild to moderate decrease  G3b                   30-44                Moderate to severe decrease  G4                    15-29                Severe decrease  G5                    14 or less           Kidney failure    (1)In the absence of evidence of kidney disease, neither GFR category G1 or G2 fulfill the criteria for CKD.    eGFR calculation 2021 CKD-EPI creatinine equation, which does not include race as a factor    Lipase [[id number]]  (Abnormal) Collected: 06/05/25 1106    Specimen: Blood Updated: 06/05/25 1133     Lipase 66 U/L     BNP [[id number]]  (Normal) Collected: 06/05/25 1106    Specimen: Blood Updated: 06/05/25 1130     proBNP 488.5 pg/mL     Narrative:      This assay is used as an aid in the diagnosis of individuals suspected of  having heart failure. It can be used as an aid in the diagnosis of acute decompensated heart failure (ADHF) in patients presenting with signs and symptoms of ADHF to the emergency department (ED). In addition, NT-proBNP of <300 pg/mL indicates ADHF is not likely.    Age Range Result Interpretation  NT-proBNP Concentration (pg/mL:      <50             Positive            >450                   Gray                 300-450                    Negative             <300    50-75           Positive            >900                  Gray                300-900                  Negative            <300      >75             Positive            >1800                  Gray                300-1800                  Negative            <300    Magnesium [[id number]]  (Abnormal) Collected: 06/05/25 1106    Specimen: Blood Updated: 06/05/25 1133     Magnesium 1.5 mg/dL     CBC Auto Differential [[id number]]  (Abnormal) Collected: 06/05/25 1106    Specimen: Blood Updated: 06/05/25 1113     WBC 7.91 10*3/mm3      RBC 4.81 10*6/mm3      Hemoglobin 12.2 g/dL      Hematocrit 41.2 %      MCV 85.7 fL      MCH 25.4 pg      MCHC 29.6 g/dL      RDW 14.9 %      RDW-SD 46.5 fl      MPV 10.7 fL      Platelets 150 10*3/mm3      Neutrophil % 84.2 %      Lymphocyte % 7.6 %      Monocyte % 6.1 %      Eosinophil % 0.4 %      Basophil % 0.3 %      Immature Grans % 1.4 %      Neutrophils, Absolute 6.67 10*3/mm3      Lymphocytes, Absolute 0.60 10*3/mm3      Monocytes, Absolute 0.48 10*3/mm3      Eosinophils, Absolute 0.03 10*3/mm3      Basophils, Absolute 0.02 10*3/mm3      Immature Grans, Absolute 0.11 10*3/mm3      nRBC 0.0 /100 WBC     Mycoplasma Pneumoniae Antibody, IgM - Blood, [[id number]]  (Normal) Collected: 06/05/25 1106    Specimen: Blood Updated: 06/05/25 1619     Mycoplasma pneumo IgM Negative    POC Glucose Once [[id number]]  (Abnormal) Collected: 06/05/25 1119    Specimen: Blood Updated: 06/05/25 1121     Glucose 362 mg/dL      Comment: Serial  "Number: [id number]:  [id number]       High Sensitivity Troponin T 1Hr [[id number]]  (Abnormal) Collected: 06/05/25 1251    Specimen: Blood Updated: 06/05/25 1327     HS Troponin T 29 ng/L      Troponin T Numeric Delta -1 ng/L      Troponin T % Delta -3    Narrative:      High Sensitive Troponin T Reference Range:  <14.0 ng/L- Negative Female for AMI  <22.0 ng/L- Negative Male for AMI  >=14 - Abnormal Female indicating possible myocardial injury.  >=22 - Abnormal Male indicating possible myocardial injury.   Clinicians would have to utilize clinical acumen, EKG, Troponin, and serial changes to determine if it is an Acute Myocardial Infarction or myocardial injury due to an underlying chronic condition.         Procalcitonin [[id number]]  (Normal) Collected: 06/05/25 1251    Specimen: Blood Updated: 06/05/25 1548     Procalcitonin 0.12 ng/mL     Narrative:      As a Marker for Sepsis (Non-Neonates):    1. <0.5 ng/mL represents a low risk of severe sepsis and/or septic shock.  2. >2 ng/mL represents a high risk of severe sepsis and/or septic shock.    As a Marker for Lower Respiratory Tract Infections that require antibiotic therapy:    PCT on Admission    Antibiotic Therapy       6-12 Hrs later    >0.5                Strongly Recommended  >0.25 - <0.5        Recommended  0.1 - 0.25          Discouraged              Remeasure/reassess PCT  <0.1                Strongly Discouraged     Remeasure/reassess PCT    As 28 day mortality risk marker: \"Change in Procalcitonin Result\" (>80% or <=80%) if Day 0 (or Day 1) and Day 4 values are available. Refer to http://www.[employer]-pct-calculator.com    Change in PCT <=80%  A decrease of PCT levels below or equal to 80% defines a positive change in PCT test result representing a higher risk for 28-day all-cause mortality of patients diagnosed with severe sepsis for septic shock.    Change in PCT >80%  A decrease of PCT levels of more than 80% defines a negative change in PCT " result representing a lower risk for 28-day all-cause mortality of patients diagnosed with severe sepsis or septic shock.    This test is Prognostic not Diagnostic, if elevated correlate with clinical findings before administering antibiotic treatment.        C-reactive Protein [[id number]]  (Abnormal) Collected: 06/05/25 1251    Specimen: Blood Updated: 06/05/25 1742     C-Reactive Protein 5.95 mg/dL     Urine Drug Screen - Urine, Clean Catch [[id number]]  (Abnormal) Collected: 06/05/25 1342    Specimen: Urine, Clean Catch Updated: 06/05/25 1404     THC, Screen, Urine Negative     Phencyclidine (PCP), Urine Negative     Cocaine Screen, Urine Negative     Methamphetamine, Ur Negative     Opiate Screen Positive     Amphetamine Screen, Urine Negative     Benzodiazepine Screen, Urine Negative     Tricyclic Antidepressants Screen Negative     Methadone Screen, Urine Negative     Barbiturates Screen, Urine Negative     Oxycodone Screen, Urine Positive     Buprenorphine, Screen, Urine Negative    Narrative:      Cutoff For Drugs Screened:    Amphetamines               500 ng/ml  Barbiturates               200 ng/ml  Benzodiazepines            150 ng/ml  Cocaine                    150 ng/ml  Methadone                  200 ng/ml  Opiates                    100 ng/ml  Phencyclidine               25 ng/ml  THC                         50 ng/ml  Methamphetamine            500 ng/ml  Tricyclic Antidepressants  300 ng/ml  Oxycodone                  100 ng/ml  Buprenorphine               10 ng/ml    The normal value for all drugs tested is negative. This report includes unconfirmed screening results, with the cutoff values listed, to be used for medical treatment purposes only.  Unconfirmed results must not be used for non-medical purposes such as employment or legal testing.  Clinical consideration should be applied to any drug of abuse test, particularly when unconfirmed results are used.      Fentanyl, Urine - Urine, Clean Catch  [[id number]]  (Abnormal) Collected: 06/05/25 1342    Specimen: Urine, Clean Catch Updated: 06/05/25 1404     Fentanyl, Urine Positive    Narrative:      Negative Threshold:      Fentanyl 5 ng/mL     The normal value for the drug tested is negative. This report includes final unconfirmed screening results to be used for medical treatment purposes only. Unconfirmed results must not be used for non-medical purposes such as employment or legal testing. Clinical consideration should be applied to any drug of abuse test, particularly when unconfirmed results are used.           Urinalysis With Culture If Indicated - Straight Cath [[id number]]  (Abnormal) Collected: 06/05/25 1343    Specimen: Urine from Straight Cath Updated: 06/05/25 1544     Color, UA Dark Yellow     Appearance, UA Clear     pH, UA 6.0     Specific Gravity, UA >1.030     Glucose, UA >=1000 mg/dL (3+)     Ketones, UA Trace     Bilirubin, UA Negative     Blood, UA Negative     Protein, UA 30 mg/dL (1+)     Leuk Esterase, UA Negative     Nitrite, UA Negative     Urobilinogen, UA 1.0 E.U./dL    Narrative:      In absence of clinical symptoms, the presence of pyuria, bacteria, and/or nitrites on the urinalysis result does not correlate with infection.    S. Pneumo Ag Urine or CSF - Urine, Straight Cath [[id number]]  (Normal) Collected: 06/05/25 1343    Specimen: Urine from Straight Cath Updated: 06/05/25 1558     Strep Pneumo Ag Negative    Legionella Antigen, Urine - Urine, Straight Cath [[id number]]  (Normal) Collected: 06/05/25 1343    Specimen: Urine from Straight Cath Updated: 06/05/25 1626     LEGIONELLA ANTIGEN, URINE Negative    Urinalysis, Microscopic Only - Straight Cath [[id number]] Collected: 06/05/25 1343    Specimen: Urine from Straight Cath Updated: 06/05/25 1559     RBC, UA 0-2 /HPF      WBC, UA 0-2 /HPF      Comment: Urine culture not indicated.        Bacteria, UA None Seen /HPF      Squamous Epithelial Cells, UA 0-2 /HPF      Hyaline Casts, UA  None Seen /LPF      Calcium Oxalate Crystals, UA Small/1+ /HPF      Methodology Manual Light Microscopy    Blood Culture - Blood, Arm, Right [[id number]] Collected: 06/05/25 1402    Specimen: Blood from Arm, Right Updated: 06/05/25 1408    Blood Culture - Blood, Arm, Left [[id number]] Collected: 06/05/25 1402    Specimen: Blood from Arm, Left Updated: 06/05/25 1407    Lactic Acid, Plasma [[id number]]  (Normal) Collected: 06/05/25 1402    Specimen: Blood Updated: 06/05/25 1426     Lactate 0.9 mmol/L     POC Glucose Once [[id number]]  (Abnormal) Collected: 06/05/25 1421    Specimen: Arterial Blood Updated: 06/05/25 1427     Glucose 357 mg/dL      Comment: Serial Number: [id number]:  [id number]       Blood Gas, Arterial - [[id number]]  (Abnormal) Collected: 06/05/25 1421    Specimen: Arterial Blood Updated: 06/05/25 1427     Site Right Radial     Shaen's Test Positive     pH, Arterial 7.320 pH units      pCO2, Arterial 71.5 mm Hg      pO2, Arterial 50.6 mm Hg      HCO3, Arterial 36.8 mmol/L      Base Excess, Arterial 7.6 mmol/L      Comment: Serial Number: [id number]:  [id number]        O2 Saturation, Arterial 80.4 %      Hemoglobin, Blood Gas 15.2 g/dL      Hematocrit, Blood Gas 45.0 %      Barometric Pressure for Blood Gas 743.6000 mmHg      Modality Cannula     FIO2 32 %      Flow Rate 3.0000 lpm      Notified Who Naeem     Read Back Yes     Notified Time --     Hemodilution No     PO2/FIO2 158    POC Lactate [[id number]]  (Normal) Collected: 06/05/25 1421    Specimen: Arterial Blood Updated: 06/05/25 1427     Lactate 0.8 mmol/L      Comment: Serial Number: [id number]:  [id number]       POC Electrolyte Panel [[id number]]  (Abnormal) Collected: 06/05/25 1421    Specimen: Arterial Blood Updated: 06/05/25 1427     Sodium 142 mmol/L      POC Potassium 4.0 mmol/L      Chloride 93 mmol/L      Ionized Calcium 1.25 mmol/L      Comment: Serial Number: [id number]:  [id number]       POC Glucose Once [[id number]]  (Abnormal) Collected:  06/05/25 1552    Specimen: Blood Updated: 06/05/25 1554     Glucose 341 mg/dL      Comment: Serial Number: [id number]:  [id number]       POC Glucose 4x Daily Before Meals & at Bedtime [[id number]]  (Abnormal) Collected: 06/05/25 1746    Specimen: Blood Updated: 06/05/25 1749     Glucose 372 mg/dL      Comment: Serial Number: [id number]:  [id number]       POC Glucose Once [[id number]]  (Abnormal) Collected: 06/05/25 2034    Specimen: Blood Updated: 06/05/25 2042     Glucose 283 mg/dL      Comment: Serial Number: [id number]:  [id number]                Imaging:    XR Chest 1 View  Result Date: 6/5/2025  XR CHEST 1 VW Date of Exam: 6/5/2025 10:43 AM EDT Indication: Chest Pain Triage Protocol Comparison: Chest x-ray 5/27/2025 Findings: The heart is stable in size. There is a left chest wall port catheter with the distal tip overlying the distal superior vena cava. Aortic vascular stent is present. There is a calcified granuloma at the right lung base. No evidence for pneumothorax or pleural effusion. Surgical clips are noted in the left axillary region.     Impression: Stable chest examination without acute cardiopulmonary process. Electronically Signed: Mikayla Linares MD  6/5/2025 11:06 AM EDT  Workstation ID: [id number]        Differential Diagnosis and Discussion:    Dyspnea: Differential diagnosis includes but is not limited to metabolic acidosis, neurological disorders, psychogenic, asthma, pneumothorax, upper airway obstruction, COPD, pneumonia, noncardiogenic pulmonary edema, interstitial lung disease, anemia, congestive heart failure, and pulmonary embolism    PROCEDURES:    Labs were collected in the emergency department and all labs were reviewed and interpreted by me.  X-ray were performed in the emergency department and all X-ray impressions were independently interpreted by me.  An EKG was performed and the EKG was interpreted by me.    ECG 12 Lead ED Triage Standing Order; Chest Pain   Final  Result   HEART IBSC=825  bpm   RR Bvvqlafb=763  ms   AZ Tdoelljk=826  ms   P Horizontal Axis=5  deg   P Front Axis=68  deg   QRSD Ebowmfmw=857  ms   QT Ovjxjklf=904  ms   UIpP=599  ms   QRS Axis=31  deg   T Wave Axis=159  deg   - ABNORMAL ECG -   Sinus tachycardia   Probable  left atrial enlargement   Left bundle branch block   ST elevation secondary to IVCD   When compared with ECG of 06-May-2025 18:01:50,   Significant rate increase   Electronically Signed By: Rehan Barrera ([facility]) 2025-06-05 15:13:11   Date and Time of Study:2025-06-05 10:36:59          Procedures    MDM           Total Critical Care time of 55 minutes. Total critical care time documented does not include time spent on separately billed procedures for services of nurses or physician assistants. I personally saw and examined the patient. I have reviewed all diagnostic interpretations and treatment plans as written. I was present for the key portions of any procedures performed and the inclusive time noted in any critical care statement. Critical care time includes patient management by me, time spent at the patients bedside,  time to review lab and imaging results, discussing patient care, documentation in the medical record, and time spent with family or caregiver.          Patient Care Considerations:    SEPSIS was considered but is NOT present in the emergency department as SIRS criteria is not present.      Consultants/Shared Management Plan:    Hospitalist: I have discussed the case with hospitalist who agrees to accept the patient for admission.    Social Determinants of Health:    Patient is unable to carry out activities of daily life. Escalation of care is necessary.       Disposition and Care Coordination:    Admit:   Through independent evaluation of the patient's history, physical, and imperical data, the patient meets criteria for inpatient admission to the hospital.        Final diagnoses:   COPD exacerbation   Acute on chronic  respiratory failure with hypoxia and hypercapnia        ED Disposition       ED Disposition   Decision to Admit    Condition   --    Comment   Level of Care: Telemetry [5]   Diagnosis: Hypercapnia [198225]   Certification: I Certify That Inpatient Hospital Services Are Medically Necessary For Greater Than 2 Midnights                 This medical record created using voice recognition software.             Antonio Hartley, DO  06/05/25 8913

## 2025-06-05 NOTE — H&P
[facility] HISTORY AND PHYSICAL  Date: 2025   Patient Name: Isha Llanes  : 1965  MRN: [id number]  Primary Care Physician:  Virgil Salas MD  Date of admission: 2025    Subjective   Subjective     Chief Complaint: Shortness of breath    HPI:    Isha Llanes is a 59 y.o. female past medical history of aortic valve stenosis post-fire valve, COPD not on home oxygen, heart failure preserved ejection pressure, type 2 diabetes, Hodgkin's lymphoma, head neck cancer, anaplastic lymphoma on active treatment, and history of chronic pain on intrathecal pain pump who presents with respiratory distress.    When I saw the patient initially she was on BiPAP and she was minimally responsive.  I would have to wake her up for her to take breaths or her tidal volume would be around 100.  When she woke up she could pull normal volumes.  It is unclear if the patient received any narcotics on the drive over an EMS.  So that she is also had some chest discomfort.  Patient has been admitted several times in the past for respiratory failure.  As result she was transported to the ER for further evaluation.    In the emergency department vital signs are as follows: Temperature is 98, pulse 101, respiratory is 18, blood pressure 104/73, satting 97% on 3 L.  Patient's ABG does show that she is hypoxemic on arrival with a PO2 with 50.  CBC shows no acute abnormalities.  CMP shows no acute abnormalities of her magnesium 1.5 and elevated glucose.  BNP was normal.  ABG is 7.3/71/50.  UDS shows positive for fentanyl opioids and oxycodone.  Blood cultures were sent.  Chest x-ray shows no acute pulmonary process.  Patient was extremely sleepy.  Continue BiPAP.  At some point patient refused BiPAP but was more awake after receiving Narcan.  Will treat her for acute COPD exacerbation and acute hypoxic respiratory failure.  Chest has bilateral venous changes on her legs at this time I do not think  they are infected we will have the wound nurse see her we will also get further data to help determine where this is infection it is chronic changes.  Patient was in the hospital for acute hypoxic respiratory failure due to COPD as well as mixture of opioids and benzodiazepines depressing her respiratory effort.    All systems reviewed and really as noted above    Personal History     Past Medical History:  Aortic valve stenosis status post bio valve  COPD not on home oxygen  Heart failure preserved ejection fraction EF of 61 to 65% and grade 1 diastolic dysfunction  Type 2 diabetes  Hodgkin's lymphoma  Intrathecal pain pump  History of DVT  SLE ?  Stroke  Head neck cancer    Past Surgical History:  Abdominal surgery  Back surgery on bronchoscopy with Ion robot assist  Cardiac catheterization with valvuloplasty  Cardiac valve replacement  Cholecystectomy  Colonoscopy   Direct laryngoscopy  Endoscopy with PEG placement  Hysterectomy on pain pump insertion    Family History:   ADHD  Pancreatic cancer    prostate cancer    Social History:   Every day smoker.  No alcohol.    Home Medications:  ALPRAZolam, Insulin Lispro (1 Unit Dial), PARoxetine, budesonide, famotidine, furosemide, ipratropium-albuterol, loperamide, metoprolol succinate XL, ondansetron ODT, oxyCODONE, pain, and predniSONE    Allergies:  Allergies   Allergen Reactions   • Amoxicillin Shortness Of Breath     Has tolerated Cefazolin, Ceftriaxone, and Cefepime -Jermaine Montez, [facility]   • Ceclor [Cefaclor] Shortness Of Breath     Has tolerated Cefazolin, Ceftriaxone, and Cefepime -Jermaine Montez, [facility]     • Penicillins Shortness Of Breath     Has tolerated Cefazolin, Ceftriaxone, and Cefepime -Jermaine Montez, [facility]   • Sulfa Antibiotics Rash   • Valium [Diazepam] Mental Status Change     DEPRESSED   • Ambien [Zolpidem] Mental Status Change   • Aspirin GI Intolerance   • Ativan [Lorazepam] Mental Status Change   • Benadryl [Diphenhydramine] Anxiety     AND  MAKES HER HYPER   • Biaxin [Clarithromycin] Unknown - Low Severity   • Cephalexin Unknown - Low Severity   • Clindamycin Unknown - Low Severity   • Compazine [Prochlorperazine Edisylate] Rash   • Contrast Dye (Echo Or Unknown Ct/Mr) Other (See Comments)     Caused pain in her arm   • Doxycycline Rash   • Nsaids GI Intolerance   • Phenergan [Promethazine Hcl] GI Intolerance   • Promethazine GI Intolerance   • Vancomycin Itching           Objective   Objective     Vitals:   Temp:  [98 °F (36.7 °C)] 98 °F (36.7 °C)  Heart Rate:  [] 83  Resp:  [18-20] 18  BP: ()/(52-93) 106/78  Flow (L/min) (Oxygen Therapy):  [3] 3    Physical Exam    Constitutional: Chronically ill-appearing.   Eyes: Pupils equal, sclerae anicteric, no conjunctival injection   HENT: NCAT, mucous membranes moist   Neck: Supple, no thyromegaly, no lymphadenopathy, trachea midline   Respiratory:  wheezing throughout.   Cardiovascular: RRR, no murmurs, rubs, or gallops, palpable pedal pulses bilaterally   Gastrointestinal: Positive bowel sounds, soft, nontender, nondistended   Musculoskeletal: Bilateral venous changes.  She does have a couple of open lesions.  No streaking erythema up the leg.  No warmth.  Chronic   Psychiatric: Appropriate affect, cooperative   Neurologic: Oriented x 3, strength symmetric in all extremities, Cranial Nerves grossly intact to confrontation, speech clear   Skin: No rashes     Result Review    Result Review:  I have personally reviewed the results from the time of this admission to 6/5/2025 17:22 EDT and agree with these findings:  Imaging and labs were reviewed      Assessment & Plan   Assessment / Plan     Assessment/Plan:   Acute metabolic encephalopathy due to opioids and COPD  Acute hypoxic respiratory failure due to COPD  Acute hypercapnic respiratory failure due to COPD and opioid use  Type 2 diabetes with hyperglycemia  Heart failure without exacerbation  Hodgkin's lymphoma in 2008  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  Anaplastic large cell lymphoma    Addendum: Was called to bedside.  The patient was much more awake than she had been earlier in the day.  I think that the Narcan did help reverse the fentanyl that apparently she received on her way here.  I am still very concerned about the patient's respiratory status.  I had a long discussion with the family and I also discussed the patient's presentation to the ER physician we both agree that opioid medications are not the safest thing for her right now.  We did discuss and broach comfort care versus hospice care the patient is not interested in that right now.  Family agrees that the patient is a fighter.  Will give her IV Tylenol as well as make Toradol available for pain control.  I am hesitant until I see her response to give her any more opioids.  She does have Dilaudid pain pump going.      Plan:  --Admit to hospital service  --Patient was sleepy most likely due to a combination of COPD and opioid use.  She was initially placed on BiPAP.  The patient eventually woke up more and refused to wear the BiPAP.  It was explained to her that if she does not wear BiPAP that she is at risk of intubation or death  -- Still continue to recommend BiPAP will watch patient's mental status  -- Brovana/Pulmicort/DuoNebs  --Doxycycline  -- Prednisone 40  -- IV Tylenol and Toradol due to severe respiratory distress and opioid use  --If patient does require opioids throughout the night then would only use half her home dose and would avoid mixing both opioids and benzodiazepines.  -- Patient's bilateral legs do appear to have venous changes.  I do not see anything that is concerning for infection at this time.  --Consult wound nurse  -- We will get procalcitonin and CRP if those are concerning for infection will start cefazolin  -- Palliative care consult    VTE Prophylaxis:  Lovenox        CODE STATUS:    Code  Status (Patient has no pulse and is not breathing): CPR (Attempt to Resuscitate)  Medical Interventions (Patient has pulse or is breathing): Full Support  Level Of Support Discussed With: Patient      Admission Status:  I believe this patient meets full code status.    Electronically signed by Pranay Ramírez MD, 06/05/25, 2:35 PM EDT.

## 2025-06-05 NOTE — PLAN OF CARE
Goal Outcome Evaluation:   This RN got patient settled in bed. Report given to nightshift. VSS and patient on 3L NC.

## 2025-06-06 LAB
027 TOXIN: NORMAL
ALBUMIN SERPL-MCNC: 3 G/DL (ref 3.5–5.2)
ALBUMIN/GLOB SERPL: 1 G/DL
ALP SERPL-CCNC: 129 U/L (ref 39–117)
ALT SERPL W P-5'-P-CCNC: 12 U/L (ref 1–33)
ANION GAP SERPL CALCULATED.3IONS-SCNC: 10.5 MMOL/L (ref 5–15)
ARTERIAL PATENCY WRIST A: ABNORMAL
AST SERPL-CCNC: 13 U/L (ref 1–32)
ATMOSPHERIC PRESS: 743 MMHG
BASE EXCESS BLDA CALC-SCNC: 6.7 MMOL/L (ref -2–2)
BASOPHILS # BLD AUTO: 0.03 10*3/MM3 (ref 0–0.2)
BASOPHILS NFR BLD AUTO: 0.3 % (ref 0–1.5)
BDY SITE: ABNORMAL
BILIRUB SERPL-MCNC: 0.3 MG/DL (ref 0–1.2)
BUN SERPL-MCNC: 11.9 MG/DL (ref 6–20)
BUN/CREAT SERPL: 37.2 (ref 7–25)
C DIFF TOX GENS STL QL NAA+PROBE: NEGATIVE
CALCIUM SPEC-SCNC: 8.4 MG/DL (ref 8.6–10.5)
CHLORIDE SERPL-SCNC: 98 MMOL/L (ref 98–107)
CO2 SERPL-SCNC: 28.5 MMOL/L (ref 22–29)
CREAT SERPL-MCNC: 0.32 MG/DL (ref 0.57–1)
DEPRECATED RDW RBC AUTO: 45.2 FL (ref 37–54)
EGFRCR SERPLBLD CKD-EPI 2021: 120.5 ML/MIN/1.73
EOSINOPHIL # BLD AUTO: 0 10*3/MM3 (ref 0–0.4)
EOSINOPHIL NFR BLD AUTO: 0 % (ref 0.3–6.2)
ERYTHROCYTE [DISTWIDTH] IN BLOOD BY AUTOMATED COUNT: 14.8 % (ref 12.3–15.4)
GAS FLOW AIRWAY: 2 LPM
GLOBULIN UR ELPH-MCNC: 2.9 GM/DL
GLUCOSE BLDC GLUCOMTR-MCNC: 148 MG/DL (ref 70–99)
GLUCOSE BLDC GLUCOMTR-MCNC: 206 MG/DL (ref 70–99)
GLUCOSE BLDC GLUCOMTR-MCNC: 354 MG/DL (ref 70–99)
GLUCOSE BLDC GLUCOMTR-MCNC: 393 MG/DL (ref 70–99)
GLUCOSE SERPL-MCNC: 145 MG/DL (ref 65–99)
HCO3 BLDA-SCNC: 33 MMOL/L (ref 22–26)
HCT VFR BLD AUTO: 42.5 % (ref 34–46.6)
HCT VFR BLD CALC: 38 % (ref 38–51)
HEMODILUTION: NO
HGB BLD-MCNC: 12.6 G/DL (ref 12–15.9)
HGB BLDA-MCNC: 13 G/DL (ref 12–18)
IMM GRANULOCYTES # BLD AUTO: 0.1 10*3/MM3 (ref 0–0.05)
IMM GRANULOCYTES NFR BLD AUTO: 1.1 % (ref 0–0.5)
INHALED O2 CONCENTRATION: 28 %
LYMPHOCYTES # BLD AUTO: 0.63 10*3/MM3 (ref 0.7–3.1)
LYMPHOCYTES NFR BLD AUTO: 6.7 % (ref 19.6–45.3)
MAGNESIUM SERPL-MCNC: 1.7 MG/DL (ref 1.6–2.6)
MCH RBC QN AUTO: 24.9 PG (ref 26.6–33)
MCHC RBC AUTO-ENTMCNC: 29.6 G/DL (ref 31.5–35.7)
MCV RBC AUTO: 84 FL (ref 79–97)
MODALITY: ABNORMAL
MONOCYTES # BLD AUTO: 0.41 10*3/MM3 (ref 0.1–0.9)
MONOCYTES NFR BLD AUTO: 4.4 % (ref 5–12)
NEUTROPHILS NFR BLD AUTO: 8.2 10*3/MM3 (ref 1.7–7)
NEUTROPHILS NFR BLD AUTO: 87.5 % (ref 42.7–76)
NRBC BLD AUTO-RTO: 0 /100 WBC (ref 0–0.2)
PCO2 BLDA: 53.9 MM HG (ref 35–45)
PH BLDA: 7.39 PH UNITS (ref 7.35–7.45)
PLATELET # BLD AUTO: 180 10*3/MM3 (ref 140–450)
PMV BLD AUTO: 10.8 FL (ref 6–12)
PO2 BLD: 368 MM[HG] (ref 0–500)
PO2 BLDA: 103 MM HG (ref 80–100)
POTASSIUM SERPL-SCNC: 4.1 MMOL/L (ref 3.5–5.2)
PROCALCITONIN SERPL-MCNC: 0.09 NG/ML (ref 0–0.25)
PROT SERPL-MCNC: 5.9 G/DL (ref 6–8.5)
RBC # BLD AUTO: 5.06 10*6/MM3 (ref 3.77–5.28)
SAO2 % BLDCOA: 97.7 % (ref 95–99)
SODIUM SERPL-SCNC: 137 MMOL/L (ref 136–145)
WBC NRBC COR # BLD AUTO: 9.37 10*3/MM3 (ref 3.4–10.8)

## 2025-06-06 PROCEDURE — 85025 COMPLETE CBC W/AUTO DIFF WBC: CPT | Performed by: INTERNAL MEDICINE

## 2025-06-06 PROCEDURE — 94660 CPAP INITIATION&MGMT: CPT

## 2025-06-06 PROCEDURE — 63710000001 INSULIN LISPRO (HUMAN) PER 5 UNITS: Performed by: INTERNAL MEDICINE

## 2025-06-06 PROCEDURE — 94799 UNLISTED PULMONARY SVC/PX: CPT

## 2025-06-06 PROCEDURE — 25010000002 KETOROLAC TROMETHAMINE PER 15 MG: Performed by: INTERNAL MEDICINE

## 2025-06-06 PROCEDURE — 99232 SBSQ HOSP IP/OBS MODERATE 35: CPT | Performed by: FAMILY MEDICINE

## 2025-06-06 PROCEDURE — 25010000002 AZITHROMYCIN PER 500 MG: Performed by: INTERNAL MEDICINE

## 2025-06-06 PROCEDURE — 82948 REAGENT STRIP/BLOOD GLUCOSE: CPT

## 2025-06-06 PROCEDURE — 36600 WITHDRAWAL OF ARTERIAL BLOOD: CPT | Performed by: PHYSICIAN ASSISTANT

## 2025-06-06 PROCEDURE — 82803 BLOOD GASES ANY COMBINATION: CPT | Performed by: PHYSICIAN ASSISTANT

## 2025-06-06 PROCEDURE — 84145 PROCALCITONIN (PCT): CPT | Performed by: INTERNAL MEDICINE

## 2025-06-06 PROCEDURE — 94761 N-INVAS EAR/PLS OXIMETRY MLT: CPT

## 2025-06-06 PROCEDURE — 83735 ASSAY OF MAGNESIUM: CPT | Performed by: INTERNAL MEDICINE

## 2025-06-06 PROCEDURE — 87493 C DIFF AMPLIFIED PROBE: CPT | Performed by: FAMILY MEDICINE

## 2025-06-06 PROCEDURE — 63710000001 PREDNISONE PER 1 MG: Performed by: INTERNAL MEDICINE

## 2025-06-06 PROCEDURE — 80053 COMPREHEN METABOLIC PANEL: CPT | Performed by: INTERNAL MEDICINE

## 2025-06-06 PROCEDURE — 25810000003 SODIUM CHLORIDE 0.9 % SOLUTION 250 ML FLEX CONT: Performed by: INTERNAL MEDICINE

## 2025-06-06 PROCEDURE — 25010000002 ENOXAPARIN PER 10 MG: Performed by: INTERNAL MEDICINE

## 2025-06-06 PROCEDURE — 82948 REAGENT STRIP/BLOOD GLUCOSE: CPT | Performed by: INTERNAL MEDICINE

## 2025-06-06 PROCEDURE — 94664 DEMO&/EVAL PT USE INHALER: CPT

## 2025-06-06 RX ORDER — PAROXETINE 20 MG/1
20 TABLET, FILM COATED ORAL EVERY MORNING
Status: DISCONTINUED | OUTPATIENT
Start: 2025-06-06 | End: 2025-06-09

## 2025-06-06 RX ORDER — METOPROLOL SUCCINATE 25 MG/1
25 TABLET, EXTENDED RELEASE ORAL NIGHTLY
Status: DISCONTINUED | OUTPATIENT
Start: 2025-06-06 | End: 2025-06-09

## 2025-06-06 RX ORDER — OXYCODONE HYDROCHLORIDE 5 MG/1
2.5 TABLET ORAL EVERY 8 HOURS PRN
Refills: 0 | Status: DISCONTINUED | OUTPATIENT
Start: 2025-06-06 | End: 2025-06-06

## 2025-06-06 RX ORDER — LOPERAMIDE HYDROCHLORIDE 2 MG/1
4 CAPSULE ORAL 3 TIMES DAILY PRN
Status: DISCONTINUED | OUTPATIENT
Start: 2025-06-06 | End: 2025-06-11 | Stop reason: HOSPADM

## 2025-06-06 RX ORDER — OXYCODONE HYDROCHLORIDE 5 MG/1
5 TABLET ORAL EVERY 4 HOURS PRN
Refills: 0 | Status: DISCONTINUED | OUTPATIENT
Start: 2025-06-06 | End: 2025-06-07

## 2025-06-06 RX ORDER — OXYCODONE HYDROCHLORIDE 5 MG/1
2.5 TABLET ORAL EVERY 4 HOURS PRN
Refills: 0 | Status: DISCONTINUED | OUTPATIENT
Start: 2025-06-06 | End: 2025-06-06

## 2025-06-06 RX ORDER — ALPRAZOLAM 0.25 MG
0.25 TABLET ORAL 3 TIMES DAILY PRN
Status: DISCONTINUED | OUTPATIENT
Start: 2025-06-06 | End: 2025-06-09

## 2025-06-06 RX ORDER — FUROSEMIDE 20 MG/1
20 TABLET ORAL DAILY
Status: DISCONTINUED | OUTPATIENT
Start: 2025-06-06 | End: 2025-06-09

## 2025-06-06 RX ORDER — ONDANSETRON 2 MG/ML
4 INJECTION INTRAMUSCULAR; INTRAVENOUS EVERY 6 HOURS PRN
Status: DISCONTINUED | OUTPATIENT
Start: 2025-06-06 | End: 2025-06-11 | Stop reason: HOSPADM

## 2025-06-06 RX ORDER — ALPRAZOLAM 0.25 MG
0.12 TABLET ORAL 3 TIMES DAILY PRN
Status: DISCONTINUED | OUTPATIENT
Start: 2025-06-06 | End: 2025-06-06

## 2025-06-06 RX ORDER — ALPRAZOLAM 0.25 MG
0.25 TABLET ORAL ONCE
Status: DISCONTINUED | OUTPATIENT
Start: 2025-06-06 | End: 2025-06-09

## 2025-06-06 RX ADMIN — ARFORMOTEROL TARTRATE 15 MCG: 15 SOLUTION RESPIRATORY (INHALATION) at 18:31

## 2025-06-06 RX ADMIN — INSULIN LISPRO 6 UNITS: 100 INJECTION, SOLUTION INTRAVENOUS; SUBCUTANEOUS at 21:15

## 2025-06-06 RX ADMIN — IPRATROPIUM BROMIDE AND ALBUTEROL SULFATE 3 ML: .5; 3 SOLUTION RESPIRATORY (INHALATION) at 06:32

## 2025-06-06 RX ADMIN — Medication 10 ML: at 08:02

## 2025-06-06 RX ADMIN — KETOROLAC TROMETHAMINE 15 MG: 15 INJECTION, SOLUTION INTRAMUSCULAR; INTRAVENOUS at 04:39

## 2025-06-06 RX ADMIN — ALPRAZOLAM 0.12 MG: 0.25 TABLET ORAL at 16:25

## 2025-06-06 RX ADMIN — IPRATROPIUM BROMIDE AND ALBUTEROL SULFATE 3 ML: .5; 3 SOLUTION RESPIRATORY (INHALATION) at 18:31

## 2025-06-06 RX ADMIN — ARFORMOTEROL TARTRATE 15 MCG: 15 SOLUTION RESPIRATORY (INHALATION) at 06:32

## 2025-06-06 RX ADMIN — IPRATROPIUM BROMIDE AND ALBUTEROL SULFATE 3 ML: .5; 3 SOLUTION RESPIRATORY (INHALATION) at 12:33

## 2025-06-06 RX ADMIN — ACETAMINOPHEN 650 MG: 160 SOLUTION ORAL at 04:40

## 2025-06-06 RX ADMIN — PREDNISONE 40 MG: 20 TABLET ORAL at 08:01

## 2025-06-06 RX ADMIN — OXYCODONE 2.5 MG: 5 TABLET ORAL at 21:14

## 2025-06-06 RX ADMIN — INSULIN LISPRO 3 UNITS: 100 INJECTION, SOLUTION INTRAVENOUS; SUBCUTANEOUS at 12:03

## 2025-06-06 RX ADMIN — IPRATROPIUM BROMIDE AND ALBUTEROL SULFATE 3 ML: .5; 3 SOLUTION RESPIRATORY (INHALATION) at 16:47

## 2025-06-06 RX ADMIN — OXYCODONE 2.5 MG: 5 TABLET ORAL at 11:39

## 2025-06-06 RX ADMIN — ENOXAPARIN SODIUM 40 MG: 100 INJECTION SUBCUTANEOUS at 08:01

## 2025-06-06 RX ADMIN — INSULIN LISPRO 6 UNITS: 100 INJECTION, SOLUTION INTRAVENOUS; SUBCUTANEOUS at 17:36

## 2025-06-06 RX ADMIN — IPRATROPIUM BROMIDE AND ALBUTEROL SULFATE 3 ML: .5; 3 SOLUTION RESPIRATORY (INHALATION) at 03:48

## 2025-06-06 RX ADMIN — OXYCODONE 2.5 MG: 5 TABLET ORAL at 16:16

## 2025-06-06 RX ADMIN — OXYCODONE 2.5 MG: 5 TABLET ORAL at 08:01

## 2025-06-06 RX ADMIN — ALPRAZOLAM 0.25 MG: 0.25 TABLET ORAL at 22:44

## 2025-06-06 RX ADMIN — AZITHROMYCIN DIHYDRATE 500 MG: 500 INJECTION, POWDER, LYOPHILIZED, FOR SOLUTION INTRAVENOUS at 16:16

## 2025-06-06 RX ADMIN — Medication 10 ML: at 21:16

## 2025-06-06 RX ADMIN — PAROXETINE HYDROCHLORIDE 20 MG: 20 TABLET, FILM COATED ORAL at 08:01

## 2025-06-06 RX ADMIN — BUDESONIDE 0.5 MG: 0.5 SUSPENSION RESPIRATORY (INHALATION) at 18:31

## 2025-06-06 RX ADMIN — FUROSEMIDE 20 MG: 20 TABLET ORAL at 08:01

## 2025-06-06 RX ADMIN — IPRATROPIUM BROMIDE AND ALBUTEROL SULFATE 3 ML: .5; 3 SOLUTION RESPIRATORY (INHALATION) at 00:28

## 2025-06-06 RX ADMIN — BUDESONIDE 0.5 MG: 0.5 SUSPENSION RESPIRATORY (INHALATION) at 06:32

## 2025-06-06 NOTE — CONSULTS
Purpose of the visit was to evaluate for: support for patient/family. Spoke with RN, patient, and family and discussed goals of care, resuscitation status, and clarify code status.      Assessment:  RN, CCM, Palliative care met patient and daughter to introduce self and assess Palliative Care needs.  Patient resting with eyes closed.  Daughter reports no Living Will or POA paperwork.  Daughter reports patient has voiced wanting to be a full code in the past and will honor her wishes.  Palliative will revisit room when patient is awake and alert.  Provided emotional support to daughter and will continue to follow.       Tasks Completed: Code Status clarification and Emotional Support.    Magdalene ANGULO RN, [facility]  Palliative Care

## 2025-06-06 NOTE — CONSULTS
Nutrition Services    Patient Name: Isha Llanes  YOB: 1965  MRN: [id number]  Admission date: 6/5/2025      CLINICAL NUTRITION ASSESSMENT      Reason for Assessment  Tube Feeding Assessment     H&P:  Past Medical History:   Diagnosis Date    Anesthesia     REPORTS HAS GOT REAL EMOTIONAL AND HAS BECOME ANGRY BEFORE    Anxiety     Aortic stenosis, severe     HAS BIO VALVE REPLACED    Arthritis     Asthma     Back pain     Blood clotting disorder     test to find out what type    Cancer     CHF (congestive heart failure)     Chronic low back pain with sciatica     Chronic pain disorder     COPD (chronic obstructive pulmonary disease)     Coronary artery disease     looking to follow with Cardio    Diabetes mellitus     TYPE 2    Dyspnea on effort     Fatty liver     GERD (gastroesophageal reflux disease)     H/O lumpectomy     H/O: hysterectomy     Hiatal hernia     History of degenerative disc disease     History of kidney stones     History of pulmonary embolus (PE)     History of transfusion     AS A CHILD NO TRANSFUSION REACTION    Hodgkin's lymphoma     5/19/23  5 YEARS SINCE LAST CHEMO TX    Hypertension     Immobility     Lupus     Nausea vomiting and diarrhea 03/07/2024    Panic disorder     Pelvic pain     Peripheral neuropathy     Personal history of DVT (deep vein thrombosis)     over 20 years    PONV (postoperative nausea and vomiting)     Presence of intrathecal pump     PTSD (post-traumatic stress disorder)     Sacroiliac joint disease     SI (sacroiliac) joint inflammation     Venous insufficiency         Current Problems:   Active Hospital Problems    Diagnosis     **Hypercapnia         Nutrition/Diet History         Narrative   Isha Llanes is a 59 y.o. female past medical history of aortic valve stenosis post-fire valve, COPD not on home oxygen, heart failure preserved ejection pressure, type 2 diabetes, Hodgkin's lymphoma, head neck cancer, anaplastic lymphoma on active  "treatment, and history of chronic pain on intrathecal pain pump who presents with respiratory distress.     Patient is Aa0x3 upon RD visit NAD.  Patient's daughter acts as primary caregiver. RN reports poor oral intake, <25%.  Patient has PEG in place, dry, needs flushing and cleaning.    Daughter reports the patient is used to receiving bolus feeds through the PEG. RD to order Boost VHC 4 cartons per day. RN made aware.     Daughter confirms pt has experiencing slight nausea and diarrhea. Last BM 6/03    Pt reports her UBW to be 140 pounds. Daughter reports she now weighs around 111-116 pounds.    Daughter notes a 30 pound weight loss in a near 6 month time period; significant.      Anthropometrics        Current Height, Weight Height: 167.6 cm (65.98\")  Weight: 58.6 kg (129 lb 3 oz)   Current BMI Body mass index is 20.86 kg/m².   BMI Classification Normal range   % IBW    Adjusted Body Weight (ABW)    Weight Hx  Wt Readings from Last 30 Encounters:   06/06/25 0542 58.6 kg (129 lb 3 oz)   06/05/25 1744 53.5 kg (117 lb 15.1 oz)   06/05/25 1030 61.8 kg (136 lb 3.9 oz)   05/27/25 1742 56.6 kg (124 lb 12.5 oz)   05/15/25 1332 63.7 kg (140 lb 6.9 oz)   05/04/25 0815 60.8 kg (134 lb 0.6 oz)   04/25/25 1132 59 kg (130 lb)   04/22/25 0634 58.9 kg (129 lb 13.6 oz)   04/10/25 1411 54.9 kg (121 lb)   04/17/25 1523 59 kg (130 lb)   04/09/25 1109 54.9 kg (121 lb)   03/28/25 2000 66.1 kg (145 lb 11.6 oz)   03/28/25 0920 62.4 kg (137 lb 9.6 oz)   03/26/25 1047 62.6 kg (138 lb)   03/18/25 0347 62.7 kg (138 lb 3.7 oz)   03/18/25 0023 63.8 kg (140 lb 10.5 oz)   02/08/25 0602 63.8 kg (140 lb 10.5 oz)   12/23/24 0750 61.3 kg (135 lb 0.5 oz)   12/11/24 0642 58.1 kg (128 lb 1.4 oz)   12/10/24 0541 61 kg (134 lb 7.7 oz)   12/09/24 0531 59.6 kg (131 lb 6.3 oz)   12/07/24 0745 64 kg (141 lb 1.5 oz)   11/07/24 0406 57.7 kg (127 lb 3.3 oz)   11/06/24 2148 59.9 kg (132 lb 0.9 oz)   09/13/24 0943 57.6 kg (127 lb)   08/21/24 1605 65.7 kg (144 " lb 13.5 oz)   08/21/24 1421 60.3 kg (133 lb)   08/07/24 0928 60.3 kg (133 lb)   07/20/24 0752 60.5 kg (133 lb 6.1 oz)   07/02/24 1340 60.5 kg (133 lb 6.1 oz)   06/27/24 0950 46.7 kg (103 lb)   06/13/24 0417 58.6 kg (129 lb 3 oz)   06/12/24 2236 59.3 kg (130 lb 11.7 oz)   06/06/24 1049 48 kg (105 lb 14.4 oz)   05/29/24 2259 62.2 kg (137 lb 2 oz)   05/23/24 1105 47.6 kg (105 lb)   05/09/24 1042 52.2 kg (115 lb)   05/06/24 1038 58 kg (127 lb 14.4 oz)   04/27/24 1041 64.9 kg (143 lb 1.3 oz)   04/10/24 1200 45.8 kg (101 lb)          Wt Change Observation Pt reports her UBW to be 140 pounds. Daughter reports she now weighs around 111-116 pounds.    Daughter notes a 30 pound weight loss in a near 6 month time period; significant.     This thirty pound weight loss is not noted in the EMR. Pt weight seemingly fluctuates between 130-140 pounds.      Estimated/Assessed Needs  Estimated Needs based on: Current Body Weight       Energy Requirements 30-35 kcal/kg    EST Needs (kcal/day) 7235-5210 kcals/d       Protein Requirements 1.0-1.2 g/kg   EST Daily Needs (g/day) 59-70 g/d       Fluid Requirements 1 ml/kcal    Estimated Needs (mL/day) 0399-9957      Labs/Medications         Pertinent Labs Reviewed.   Results from last 7 days   Lab Units 06/06/25  0435 06/05/25  1106   SODIUM mmol/L 137 136   POTASSIUM mmol/L 4.1 3.7   CHLORIDE mmol/L 98 97*   CO2 mmol/L 28.5 30.6*   BUN mg/dL 11.9 15.1   CREATININE mg/dL 0.32* 0.36*   CALCIUM mg/dL 8.4* 8.2*   BILIRUBIN mg/dL 0.3 0.3   ALK PHOS U/L 129* 129*   ALT (SGPT) U/L 12 9   AST (SGOT) U/L 13 10   GLUCOSE mg/dL 145* 357*     Results from last 7 days   Lab Units 06/06/25  0435 06/05/25  1106   MAGNESIUM mg/dL 1.7 1.5*   HEMOGLOBIN g/dL 12.6 12.2   HEMATOCRIT % 42.5 41.2     COVID19   Date Value Ref Range Status   05/04/2025 Not Detected Not Detected - Ref. Range Final     Lab Results   Component Value Date    HGBA1C 5.90 (H) 11/29/2023         Pertinent Medications Reviewed.      Malnutrition Severity Assessment              Nutrition Diagnosis         Nutrition Dx Problem 1 Inadequate energy Intake related to poor pt appetite as evidenced by report of minimal PO intake.     Nutrition Intervention           Current Nutrition Orders & Evaluation of Intake       Current PO Diet Diet: Cardiac, Diabetic; Healthy Heart (2-3 Na+); Consistent Carbohydrate; Fluid Consistency: Thin (IDDSI 0)   Supplement Orders Placed This Encounter      Tube Feeding: Formula: Boost VHC; Feeding Type: Bolus; By: Pump; Type: Set Rate; Volume: 240 mL; Feedings Per Day: 4; Bolus Schedule: 06, 11, 17, 22; Water Flush: Other; Other: 175mL; Every: Before & After TF Bolus; Water Bolus: None           Nutrition Intervention/Prescription        RD recommends Bolus feeds into PEG tube    4 cartons Boost VHC through PEG. Syringe or Gravity fed. Every 4 hours.    Water Flush before and after feedings; 175mL q4h or per MD order    At goal   2120 kcals, 88g  Protein, 636 mL free H20      Elevate HOB 30-45 degrees          Medical Nutrition Therapy/Nutrition Education          Learner     Readiness N/A  N/A     Method     Response N/A  N/A     Monitor/Evaluation        Monitor PO intake, EN delivery/tolerance     Nutrition Discharge Plan         To be determined     Electronically signed by:  Renny Seals RD  06/06/25 11:45 EDT

## 2025-06-06 NOTE — SIGNIFICANT NOTE
06/06/25 1000   Coping/Psychosocial   Observed Emotional State anxious   Verbalized Emotional State grief;anxiety   Trust Relationship/Rapport empathic listening provided   Family/Support Persons daughter   Involvement in Care interacting with patient   Additional Documentation Spiritual Care (Group)   Spiritual Care   Spiritual Care Visit Type initial   Spiritual Care Source palliative care   Receptivity to Spiritual Care visit welcomed   Spiritual Care Interventions supportive conversation provided;prayer support provided   Response to Spiritual Care receptive of support;thanks expressed   Use of Spiritual Resources prayer   Spiritual Care Follow-Up follow-up, none required as presently assessed     Length of visit: 10 min

## 2025-06-06 NOTE — PLAN OF CARE
Goal Outcome Evaluation:  Plan of Care Reviewed With: patient           Outcome Evaluation: Patient wore BIPAP 14/6, RR16, 26% for a couple of hours last night. She is currently on 2l nasal cannula.

## 2025-06-06 NOTE — THERAPY EVALUATION
RT EQUIPMENT DEVICE RELATED - SKIN ASSESSMENT    RT Medical Equipment/Device:     NIV Mask:  Under-the-nose   size:  .    Skin Assessment:  left cheek scab, pt has lots of scabs on body, nose intact, chin intact      Device Skin Pressure Protection:  Positioning supports utilized    Nurse Notification:  Zayda Cloud, RRT

## 2025-06-06 NOTE — SIGNIFICANT NOTE
Wound Eval / Progress Noted    JEMMA Go     Patient Name: Isha Llanes  : 1965  MRN: [id number]  Today's Date: 2025                 Admit Date: 2025    Visit Dx:    ICD-10-CM ICD-9-CM   1. COPD exacerbation  J44.1 491.21   2. Acute on chronic respiratory failure with hypoxia and hypercapnia  J96.21 518.84    J96.22 786.09     799.02         Hypercapnia        Past Medical History:   Diagnosis Date    Anesthesia     REPORTS HAS GOT REAL EMOTIONAL AND HAS BECOME ANGRY BEFORE    Anxiety     Aortic stenosis, severe     HAS BIO VALVE REPLACED    Arthritis     Asthma     Back pain     Blood clotting disorder     test to find out what type    Cancer     CHF (congestive heart failure)     Chronic low back pain with sciatica     Chronic pain disorder     COPD (chronic obstructive pulmonary disease)     Coronary artery disease     looking to follow with Cardio    Diabetes mellitus     TYPE 2    Dyspnea on effort     Fatty liver     GERD (gastroesophageal reflux disease)     H/O lumpectomy     H/O: hysterectomy     Hiatal hernia     History of degenerative disc disease     History of kidney stones     History of pulmonary embolus (PE)     History of transfusion     AS A CHILD NO TRANSFUSION REACTION    Hodgkin's lymphoma     23  5 YEARS SINCE LAST CHEMO TX    Hypertension     Immobility     Lupus     Nausea vomiting and diarrhea 2024    Panic disorder     Pelvic pain     Peripheral neuropathy     Personal history of DVT (deep vein thrombosis)     over 20 years    PONV (postoperative nausea and vomiting)     Presence of intrathecal pump     PTSD (post-traumatic stress disorder)     Sacroiliac joint disease     SI (sacroiliac) joint inflammation     Venous insufficiency         Past Surgical History:   Procedure Laterality Date    ABDOMINAL SURGERY      BACK SURGERY      SPINAL FUSION     BACK SURGERY      BLADDER REPAIR      BRONCHOSCOPY WITH ION ROBOTIC ASSIST N/A 2025    Procedure:  BRONCHOSCOPY WITH ION ROBOT: REBUS, EBUS, NEEDLE ASPIRATES, BIOPSIES, BRUSHINGS, BAL, WASHINGS: insertion of lighted robot navigated instrument to view inside the lung;  Surgeon: Enzo Segal MD;  Location: [facility] MAIN OR;  Service: Robotics - Pulmonary;  Laterality: N/A;    CARDIAC CATHETERIZATION N/A 03/06/2019    Procedure: Valvuloplasty;  Surgeon: Wayne Johnson MD;  Location: [facility] INVASIVE LOCATION;  Service: Cardiology    CARDIAC CATHETERIZATION      CARDIAC CATHETERIZATION Left 05/31/2023    Procedure: Cardiac Catheterization/Vascular Study;  Surgeon: Poncho Reid MD;  Location: [facility] CATH INVASIVE LOCATION;  Service: Cardiovascular;  Laterality: Left;    CARDIAC SURGERY      Mechanical valve    CARDIAC VALVE REPLACEMENT  2021    CHOLECYSTECTOMY      COLONOSCOPY N/A 12/29/2021    Procedure: COLONOSCOPY;  Surgeon: Karine Orlando MD;  Location: [facility] ENDOSCOPY;  Service: Gastroenterology;  Laterality: N/A;  POOR PREP    COLONOSCOPY N/A 04/13/2022    Procedure: COLONOSCOPY WITH POLYPECTOMY;  Surgeon: Karine Orlando MD;  Location: [facility] ENDOSCOPY;  Service: Gastroenterology;  Laterality: N/A;  COLON POLYP, HEMORRHOIDS, POOR PREP    COLONOSCOPY      DIRECT LARYNGOSCOPY, ESOPHAGOSCOPY, BRONCHOSCOPY N/A 05/22/2023    Procedure: DIRECT LARYNGOSCOPY WITH BIOPSIES , ESOPHAGOSCOPY, BRONCHOSCOPY;  Surgeon: Ronnie Mcgarry MD;  Location: [facility] OR OSC;  Service: ENT;  Laterality: N/A;    ENDOSCOPY N/A 12/29/2021    Procedure: ESOPHAGOGASTRODUODENOSCOPY;  Surgeon: Karine Orlando MD;  Location: [facility] ENDOSCOPY;  Service: Gastroenterology;  Laterality: N/A;  ESOPHAGITIS, GASTRITIS, HIATAL HERNIA    ENDOSCOPY      ENDOSCOPY N/A 04/16/2024    Procedure: ESOPHAGOGASTRODUODENOSCOPY WITH BIOPSIES;  Surgeon: Karine Orlando MD;  Location: [facility] ENDOSCOPY;  Service: Gastroenterology;  Laterality: N/A;  ESOPHAGITIS, HIATAL HERNIA    ENDOSCOPY W/ PEG TUBE  PLACEMENT N/A 12/10/2024    Procedure: ESOPHAGOGASTRODUODENOSCOPY WITH PERCUTANEOUS ENDOSCOPIC GASTROSTOMY TUBE INSERTION WITH ANESTHESIA;  Surgeon: Rodger Ortega MD;  Location: [facility] ENDOSCOPY;  Service: Gastroenterology;  Laterality: N/A;  PEG TUBE INSERTION    HYSTERECTOMY      LYMPHADENECTOMY      PAIN PUMP INSERTION/REVISION N/A 10/18/2019    Procedure: PAIN PUMP INSERTION [facility] [place];  Surgeon: Horace Rios MD;  Location: [facility] OR;  Service: Pain Management    PAIN PUMP INSERTION/REVISION N/A 11/23/2020    Procedure: pain pump removal;  Surgeon: Horace Rios MD;  Location: [facility] OR;  Service: Pain Management;  Laterality: N/A;    PEG TUBE INSERTION N/A 07/26/2023    Procedure: PERCUTANEOUS ENDOSCOPIC GASTROSTOMY TUBE INSERTION;  Surgeon: Kody Mercer MD;  Location: [facility] ENDOSCOPY;  Service: General;  Laterality: N/A;  SUCCESSFUL PEG TUBE PLACE PLACEMENT    PORTACATH PLACEMENT      IN LEFT CHEST REPORTS THAT IT IS NOT FUNCTIONING    SKIN BIOPSY      TONSILLECTOMY      TUBAL ABDOMINAL LIGATION      TUMOR REMOVAL      vaginal /anal area         Physical Assessment:    RUE - scattered bruising and scabbing present no open at this time.           RLE - posterior - dry brown - cleansed with NS         RLE lateral - dry - cleansed with NS         Right 5th toe - dry brown crusting, cleansed with NS          LLE - posterior -dry red base, cleansed with NS         LLE anterior - open area with dry red base, 2 dry brown/black crustings to lower leg anterior, cleansed with NS       Wound Check / Follow-up:  patient restless, allowed for cleansing of wounds but did not participate in care. Does not want much care for wounds at this time, suggesting aquaphor as unlikely to allow or leave dressings in place, just says wants to go home.   Discussed with MD and wants to leave patient and wounds alone for now.     Buttocks assessed when patient up to BSC with staff, small  crusting noted to left side.     Patient did not want to be messed with much, measurements deferred due to agitation.       Impression: scattered wounds, some appear to be traumatic, unclear etiology of BLE wounds.     Short term goals:  skin care/wound care as patient allows.     Luz Johns RN    6/6/2025    17:12 EDT

## 2025-06-06 NOTE — PLAN OF CARE
"Goal Outcome Evaluation:  Plan of Care Reviewed With: patient        Progress: no change  Outcome Evaluation: Patient is alert and oriented. Has had numerous requests for pain medication. MD was contacted, pain medication adujsted, see MAR. Patient administered dilaudid bolus per pain pump, MD aware. Patient has had numerous loose bowel movements this shift, stool sample sent, cdiff negative. MD contacted for PRN imodium per patient request. Patient was medicated once with PRN anxiety medication, see MAR. Seen by nutritionist, bolus feeds started this shift, patient tolerating well. Seen by wound care RN, patient stated \"I will not wear dressings so do not worry about those\", wound care RN aware. Patient was educated multiple times throughout shift on importance of communicating with RN. Staff attempts to communicate with patient while providing patient care, patient ignores staff members frequently. Plan of care discussed with patient and patients daughter at bedside multiple times. Will continue with POC.                             "

## 2025-06-06 NOTE — PLAN OF CARE
Goal Outcome Evaluation:Pt lethargic, not very cooperative with breathing treatment. BIPAP was applied, 14/6, rr 16, 26% (reduced from 32% for sat of 100%). She attempted to take off initially, but was redirected. She is still wearing at time of this note. NC reduced from 3 L to 2 L, pt sat @ 100%.

## 2025-06-06 NOTE — PROGRESS NOTES
RT EQUIPMENT DEVICE RELATED - SKIN ASSESSMENT    RT Medical Equipment/Device:     NIV Mask:  Under-the-nose   size:       Skin Assessment:      Cheek:  Redness  Chin:  Intact  Nose:  Intact  Mouth:  Intact    Device Skin Pressure Protection:  Positioning supports utilized    Nurse Notification:  Zayda Escalante, RRT

## 2025-06-06 NOTE — PROGRESS NOTES
[facility]   Hospitalist Progress Note  Date: 2025  Patient Name: Isha Llanes  : 1965  MRN: [id number]  Date of admission: 2025      Subjective   Subjective     Chief complaint: Shortness of breath    Summary:  59-year-old female with history of chronic pain with intrathecal Dilaudid pain pump in situ, aortic valve stenosis status post TAVR, COPD, heart failure preserved ejection fraction, diabetes mellitus, history of Hodgkin's lymphoma, history of DVT, fatty liver disease, anxiety, PTSD, venous insufficiency, history of head and neck cancer, aplastic lymphoma on active treatment, hospitalized after shortness of breath and altered mental status with minimal responsiveness, placed on BiPAP, Narcan given, mentation improved, admitted with acute metabolic encephalopathy due to combination of opioids and COPD with acute hypoxemic respiratory failure due to COPD exacerbation with acute hypercapnia respiratory failure due to COPD and opioid use, palliative care consulted to establish goals of care    Interval follow-up: Patient seen and examined, was very uncomfortable, demanded her opioid pain medications to be resumed, discussed with the patient, gradual reintroduction given her presentation with altered mental status and COPD exacerbation with superimposed opioid overdose.  Patient already has an intrathecal Dilaudid pump, I indicated to the nursing staff she can resume her pump use, I have reduced her oxycodone use to 2.5 mg every 4 hours for breakthrough pain.  She is breathing better and much more alert and awake today, on 2 L 100% O2 sats, can be weaned down on oxygen.  No chest pain or palpitations.  Patient has chronic diarrhea but while she is here I asked that we rule out C. difficile given her frequent hospitalizations and healthcare exposures.    Review of systems:  All systems reviewed and negative except weakness, fatigue, crampy abdominal pain, diarrhea, chronic  pain    Objective   Objective     Vitals:   Temp:  [97.5 °F (36.4 °C)-98.6 °F (37 °C)] 97.7 °F (36.5 °C)  Heart Rate:  [] 108  Resp:  [18-22] 20  BP: ()/(52-93) 123/86  Flow (L/min) (Oxygen Therapy):  [2-3] 2  Physical Exam               Constitutional: Chronically ill-appearing.              Eyes: Pupils equal, sclerae anicteric, no conjunctival injection              HENT: NCAT, mucous membranes moist              Neck: Supple, full range of motion              Respiratory:  wheezing throughout.              Cardiovascular: RRR, no murmurs, rubs, or gallops, palpable pedal pulses bilaterally              Gastrointestinal: Positive bowel sounds, soft, nontender, nondistended              Musculoskeletal: Bilateral venous changes.  She does have a couple of open lesions.  No streaking erythema up the leg.  No warmth.  Chronic              Psychiatric: Appropriate affect, cooperative              Neurologic: Oriented x 3, strength symmetric in all extremities, Cranial Nerves grossly intact to confrontation, speech clear              Skin: No rashes     Result Review    Result Review:  I have personally reviewed the pertinent results from the past 24 hours to 6/6/2025 11:52 EDT and agree with these findings:  [x]  Laboratory   CBC          5/27/2025    23:22 6/5/2025    11:06 6/6/2025    04:35   CBC   WBC 8.28  7.91  9.37    RBC 4.81  4.81  5.06    Hemoglobin 12.3  12.2  12.6    Hematocrit 40.6  41.2  42.5    MCV 84.4  85.7  84.0    MCH 25.6  25.4  24.9    MCHC 30.3  29.6  29.6    RDW 14.7  14.9  14.8    Platelets 215  150  180      BMP          5/27/2025    23:22 6/5/2025    11:06 6/6/2025    04:35   BMP   BUN 12.5  15.1  11.9    Creatinine 0.37  0.36  0.32    Sodium 135  136  137    Potassium 4.8  3.7  4.1    Chloride 94  97  98    CO2 32.0  30.6  28.5    Calcium 9.8  8.2  8.4      LIVER FUNCTION TESTS:      Lab 06/06/25  0435 06/05/25  1106   TOTAL PROTEIN 5.9* 6.0   ALBUMIN 3.0* 3.2*   GLOBULIN 2.9 2.8    ALT (SGPT) 12 9   AST (SGOT) 13 10   BILIRUBIN 0.3 0.3   ALK PHOS 129* 129*   LIPASE  --  66*       [x]  Microbiology   Microbiology Results (last 10 days)       Procedure Component Value - Date/Time    S. Pneumo Ag Urine or CSF - Urine, Straight Cath [[id number]]  (Normal) Collected: 06/05/25 1343    Lab Status: Final result Specimen: Urine from Straight Cath Updated: 06/05/25 1558     Strep Pneumo Ag Negative    Legionella Antigen, Urine - Urine, Straight Cath [[id number]]  (Normal) Collected: 06/05/25 1343    Lab Status: Edited Result - FINAL Specimen: Urine from Straight Cath Updated: 06/05/25 1626     LEGIONELLA ANTIGEN, URINE Negative    Mycoplasma Pneumoniae Antibody, IgM - Blood, [[id number]]  (Normal) Collected: 06/05/25 1106    Lab Status: Final result Specimen: Blood Updated: 06/05/25 1619     Mycoplasma pneumo IgM Negative    Blood Culture - Blood, Arm, Right [[id number]]  (Normal) Collected: 05/27/25 2249    Lab Status: Final result Specimen: Blood from Arm, Right Updated: 06/01/25 2330     Blood Culture No growth at 5 days    Blood Culture - Blood, Arm, Left [[id number]]  (Normal) Collected: 05/27/25 2249    Lab Status: Final result Specimen: Blood from Arm, Left Updated: 06/01/25 2345     Blood Culture No growth at 5 days    Narrative:      Less than seven (7) mL's of blood was collected.  Insufficient quantity may yield false negative results.              [x]  Radiology XR Chest 1 View  Result Date: 6/5/2025  Impression: Stable chest examination without acute cardiopulmonary process. Electronically Signed: Mikayla Linares MD  6/5/2025 11:06 AM EDT  Workstation ID: [id number]        [x]  EKG/Telemetry   ECG 12 Lead ED Triage Standing Order; Chest Pain   Final Result   HEART JKFA=748  bpm   RR Rcoxlszv=000  ms   RI Zonrmtur=421  ms   P Horizontal Axis=5  deg   P Front Axis=68  deg   QRSD Dkgprdlw=116  ms   QT Dygdqroi=243  ms   AXxN=166  ms   QRS Axis=31  deg   T Wave Axis=159  deg   - ABNORMAL ECG -    Sinus tachycardia   Probable  left atrial enlargement   Left bundle branch block   ST elevation secondary to IVCD   When compared with ECG of 06-May-2025 18:01:50,   Significant rate increase   Electronically Signed By: Rehan Barrera ([facility]) 2025-06-05 15:13:11   Date and Time of Study:2025-06-05 10:36:59          []  Cardiology/Vascular   []  Pathology  [x]  Old records  []  Other:    Assessment & Plan   Assessment / Plan     Assessment/Plan:  Assessment:  Acute metabolic encephalopathy due to opioids and COPD  Acute hypoxic respiratory failure due to COPD  Acute hypercapnic respiratory failure due to COPD and opioid use  Type 2 diabetes with hyperglycemia  Heart failure without exacerbation  Hodgkin's lymphoma in 2008  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  Anaplastic large cell lymphoma    Plan:  Labs and imaging reviewed  Patient okay to use Dilaudid intrathecal pump  Resume oxycodone at reduced dose 2.5 mg every 4 hours for severe pain  Continue azithromycin  Check stools for C. difficile  Continue Brovana Pulmicort nebs twice daily  Wean oxygen per tolerance  Continue prednisone 40 mg daily  Continue Paxil 20 mg daily  Continue metoprolol XL 25 mg nightly  Resume Lasix 20 mg daily  PT/OT  Xanax 0.125 mg 3 times a day as needed for anxiety  A.m. labs  Full code  DVT prophylaxis with Lovenox  Clinical course to dictate further management  Discussed with nurse at the bedside    VTE Prophylaxis:  Pharmacologic VTE prophylaxis orders are present.        CODE STATUS:   Code Status (Patient has no pulse and is not breathing): CPR (Attempt to Resuscitate)  Medical Interventions (Patient has pulse or is breathing): Full Support  Level Of Support Discussed With: Patient        Electronically signed by Karen Griffith MD, 6/6/2025, 11:52 EDT.    Portions of this documentation were transcribed electronically from a voice recognition software.  I confirm  all data accurately represents the service(s) I performed at today's visit.

## 2025-06-07 ENCOUNTER — APPOINTMENT (OUTPATIENT)
Dept: GENERAL RADIOLOGY | Facility: HOSPITAL | Age: 60
DRG: 189 | End: 2025-06-07
Payer: MEDICAID

## 2025-06-07 LAB
ALBUMIN SERPL-MCNC: 3 G/DL (ref 3.5–5.2)
ALP SERPL-CCNC: 119 U/L (ref 39–117)
ALT SERPL W P-5'-P-CCNC: 10 U/L (ref 1–33)
ANION GAP SERPL CALCULATED.3IONS-SCNC: 12.8 MMOL/L (ref 5–15)
AST SERPL-CCNC: 13 U/L (ref 1–32)
BASOPHILS # BLD AUTO: 0.02 10*3/MM3 (ref 0–0.2)
BASOPHILS NFR BLD AUTO: 0.3 % (ref 0–1.5)
BILIRUB CONJ SERPL-MCNC: 0.2 MG/DL (ref 0–0.3)
BILIRUB INDIRECT SERPL-MCNC: 0.3 MG/DL
BILIRUB SERPL-MCNC: 0.5 MG/DL (ref 0–1.2)
BUN SERPL-MCNC: 7.3 MG/DL (ref 6–20)
BUN/CREAT SERPL: 24.3 (ref 7–25)
CALCIUM SPEC-SCNC: 8.2 MG/DL (ref 8.6–10.5)
CHLORIDE SERPL-SCNC: 95 MMOL/L (ref 98–107)
CO2 SERPL-SCNC: 23.2 MMOL/L (ref 22–29)
CREAT SERPL-MCNC: 0.3 MG/DL (ref 0.57–1)
DEPRECATED RDW RBC AUTO: 45.1 FL (ref 37–54)
EGFRCR SERPLBLD CKD-EPI 2021: 122.4 ML/MIN/1.73
EOSINOPHIL # BLD AUTO: 0.01 10*3/MM3 (ref 0–0.4)
EOSINOPHIL NFR BLD AUTO: 0.1 % (ref 0.3–6.2)
ERYTHROCYTE [DISTWIDTH] IN BLOOD BY AUTOMATED COUNT: 14.9 % (ref 12.3–15.4)
GEN 5 1HR TROPONIN T REFLEX: 20 NG/L
GLUCOSE BLDC GLUCOMTR-MCNC: 227 MG/DL (ref 70–99)
GLUCOSE BLDC GLUCOMTR-MCNC: 231 MG/DL (ref 70–99)
GLUCOSE BLDC GLUCOMTR-MCNC: 244 MG/DL (ref 70–99)
GLUCOSE BLDC GLUCOMTR-MCNC: 287 MG/DL (ref 70–99)
GLUCOSE SERPL-MCNC: 178 MG/DL (ref 65–99)
HCT VFR BLD AUTO: 42 % (ref 34–46.6)
HGB BLD-MCNC: 12.7 G/DL (ref 12–15.9)
IMM GRANULOCYTES # BLD AUTO: 0.06 10*3/MM3 (ref 0–0.05)
IMM GRANULOCYTES NFR BLD AUTO: 0.8 % (ref 0–0.5)
LYMPHOCYTES # BLD AUTO: 0.51 10*3/MM3 (ref 0.7–3.1)
LYMPHOCYTES NFR BLD AUTO: 6.4 % (ref 19.6–45.3)
MAGNESIUM SERPL-MCNC: 1.5 MG/DL (ref 1.6–2.6)
MCH RBC QN AUTO: 24.9 PG (ref 26.6–33)
MCHC RBC AUTO-ENTMCNC: 30.2 G/DL (ref 31.5–35.7)
MCV RBC AUTO: 82.4 FL (ref 79–97)
MONOCYTES # BLD AUTO: 0.42 10*3/MM3 (ref 0.1–0.9)
MONOCYTES NFR BLD AUTO: 5.3 % (ref 5–12)
NEUTROPHILS NFR BLD AUTO: 6.92 10*3/MM3 (ref 1.7–7)
NEUTROPHILS NFR BLD AUTO: 87.1 % (ref 42.7–76)
NRBC BLD AUTO-RTO: 0 /100 WBC (ref 0–0.2)
PHOSPHATE SERPL-MCNC: 1.3 MG/DL (ref 2.5–4.5)
PLATELET # BLD AUTO: 156 10*3/MM3 (ref 140–450)
PMV BLD AUTO: 10.9 FL (ref 6–12)
POTASSIUM SERPL-SCNC: 3.7 MMOL/L (ref 3.5–5.2)
PROT SERPL-MCNC: 5.7 G/DL (ref 6–8.5)
QT INTERVAL: 390 MS
QTC INTERVAL: 502 MS
RBC # BLD AUTO: 5.1 10*6/MM3 (ref 3.77–5.28)
SODIUM SERPL-SCNC: 131 MMOL/L (ref 136–145)
TROPONIN T % DELTA: 0
TROPONIN T NUMERIC DELTA: 0 NG/L
TROPONIN T SERPL HS-MCNC: 20 NG/L
WBC NRBC COR # BLD AUTO: 7.94 10*3/MM3 (ref 3.4–10.8)

## 2025-06-07 PROCEDURE — 94664 DEMO&/EVAL PT USE INHALER: CPT

## 2025-06-07 PROCEDURE — 83735 ASSAY OF MAGNESIUM: CPT | Performed by: FAMILY MEDICINE

## 2025-06-07 PROCEDURE — 25010000002 ONDANSETRON PER 1 MG: Performed by: FAMILY MEDICINE

## 2025-06-07 PROCEDURE — 71045 X-RAY EXAM CHEST 1 VIEW: CPT

## 2025-06-07 PROCEDURE — 82948 REAGENT STRIP/BLOOD GLUCOSE: CPT

## 2025-06-07 PROCEDURE — 85025 COMPLETE CBC W/AUTO DIFF WBC: CPT | Performed by: FAMILY MEDICINE

## 2025-06-07 PROCEDURE — 94799 UNLISTED PULMONARY SVC/PX: CPT

## 2025-06-07 PROCEDURE — 80048 BASIC METABOLIC PNL TOTAL CA: CPT | Performed by: FAMILY MEDICINE

## 2025-06-07 PROCEDURE — 63710000001 PREDNISONE PER 1 MG: Performed by: INTERNAL MEDICINE

## 2025-06-07 PROCEDURE — 93005 ELECTROCARDIOGRAM TRACING: CPT | Performed by: FAMILY MEDICINE

## 2025-06-07 PROCEDURE — 99232 SBSQ HOSP IP/OBS MODERATE 35: CPT | Performed by: FAMILY MEDICINE

## 2025-06-07 PROCEDURE — 84484 ASSAY OF TROPONIN QUANT: CPT | Performed by: FAMILY MEDICINE

## 2025-06-07 PROCEDURE — 82948 REAGENT STRIP/BLOOD GLUCOSE: CPT | Performed by: INTERNAL MEDICINE

## 2025-06-07 PROCEDURE — 63710000001 INSULIN LISPRO (HUMAN) PER 5 UNITS: Performed by: INTERNAL MEDICINE

## 2025-06-07 PROCEDURE — 25010000002 AZITHROMYCIN PER 500 MG: Performed by: INTERNAL MEDICINE

## 2025-06-07 PROCEDURE — 80076 HEPATIC FUNCTION PANEL: CPT | Performed by: FAMILY MEDICINE

## 2025-06-07 PROCEDURE — 25010000002 MAGNESIUM SULFATE 2 GM/50ML SOLUTION: Performed by: FAMILY MEDICINE

## 2025-06-07 PROCEDURE — 25810000003 SODIUM CHLORIDE 0.9 % SOLUTION 250 ML FLEX CONT: Performed by: INTERNAL MEDICINE

## 2025-06-07 PROCEDURE — 84100 ASSAY OF PHOSPHORUS: CPT | Performed by: FAMILY MEDICINE

## 2025-06-07 PROCEDURE — 94761 N-INVAS EAR/PLS OXIMETRY MLT: CPT

## 2025-06-07 PROCEDURE — 25810000003 SODIUM CHLORIDE 0.9 % SOLUTION: Performed by: FAMILY MEDICINE

## 2025-06-07 RX ORDER — OXYCODONE HYDROCHLORIDE 5 MG/1
5 TABLET ORAL EVERY 4 HOURS PRN
Status: DISCONTINUED | OUTPATIENT
Start: 2025-06-07 | End: 2025-06-09

## 2025-06-07 RX ORDER — MAGNESIUM SULFATE HEPTAHYDRATE 40 MG/ML
2 INJECTION, SOLUTION INTRAVENOUS ONCE
Status: COMPLETED | OUTPATIENT
Start: 2025-06-07 | End: 2025-06-07

## 2025-06-07 RX ORDER — FENTANYL/ROPIVACAINE/NS/PF 2-625MCG/1
15 PLASTIC BAG, INJECTION (ML) EPIDURAL
Status: COMPLETED | OUTPATIENT
Start: 2025-06-07 | End: 2025-06-07

## 2025-06-07 RX ORDER — OXYCODONE HYDROCHLORIDE 5 MG/1
10 TABLET ORAL EVERY 4 HOURS PRN
Status: DISCONTINUED | OUTPATIENT
Start: 2025-06-07 | End: 2025-06-08

## 2025-06-07 RX ORDER — NICOTINE 21 MG/24HR
1 PATCH, TRANSDERMAL 24 HOURS TRANSDERMAL
Status: DISCONTINUED | OUTPATIENT
Start: 2025-06-07 | End: 2025-06-11 | Stop reason: HOSPADM

## 2025-06-07 RX ADMIN — NICOTINE 1 PATCH: 21 PATCH, EXTENDED RELEASE TRANSDERMAL at 08:55

## 2025-06-07 RX ADMIN — OXYCODONE 5 MG: 5 TABLET ORAL at 01:41

## 2025-06-07 RX ADMIN — ACETAMINOPHEN 650 MG: 325 TABLET ORAL at 19:03

## 2025-06-07 RX ADMIN — POTASSIUM PHOSPHATE, MONOBASIC AND POTASSIUM PHOSPHATE, DIBASIC 15 MMOL: 224; 236 INJECTION, SOLUTION, CONCENTRATE INTRAVENOUS at 14:52

## 2025-06-07 RX ADMIN — HYDROXYZINE HYDROCHLORIDE 25 MG: 25 TABLET, FILM COATED ORAL at 19:04

## 2025-06-07 RX ADMIN — IPRATROPIUM BROMIDE AND ALBUTEROL SULFATE 3 ML: .5; 3 SOLUTION RESPIRATORY (INHALATION) at 07:04

## 2025-06-07 RX ADMIN — INSULIN LISPRO 3 UNITS: 100 INJECTION, SOLUTION INTRAVENOUS; SUBCUTANEOUS at 20:47

## 2025-06-07 RX ADMIN — FUROSEMIDE 20 MG: 20 TABLET ORAL at 08:53

## 2025-06-07 RX ADMIN — OXYCODONE 5 MG: 5 TABLET ORAL at 08:52

## 2025-06-07 RX ADMIN — PREDNISONE 40 MG: 20 TABLET ORAL at 08:53

## 2025-06-07 RX ADMIN — OXYCODONE 10 MG: 5 TABLET ORAL at 18:11

## 2025-06-07 RX ADMIN — MAGNESIUM SULFATE HEPTAHYDRATE 2 G: 40 INJECTION, SOLUTION INTRAVENOUS at 08:55

## 2025-06-07 RX ADMIN — ONDANSETRON 4 MG: 2 INJECTION INTRAMUSCULAR; INTRAVENOUS at 17:37

## 2025-06-07 RX ADMIN — OXYCODONE 5 MG: 5 TABLET ORAL at 14:29

## 2025-06-07 RX ADMIN — BUDESONIDE 0.5 MG: 0.5 SUSPENSION RESPIRATORY (INHALATION) at 07:04

## 2025-06-07 RX ADMIN — POTASSIUM PHOSPHATE, MONOBASIC AND POTASSIUM PHOSPHATE, DIBASIC 15 MMOL: 224; 236 INJECTION, SOLUTION, CONCENTRATE INTRAVENOUS at 08:55

## 2025-06-07 RX ADMIN — OXYCODONE 10 MG: 5 TABLET ORAL at 22:08

## 2025-06-07 RX ADMIN — ARFORMOTEROL TARTRATE 15 MCG: 15 SOLUTION RESPIRATORY (INHALATION) at 07:04

## 2025-06-07 RX ADMIN — ALPRAZOLAM 0.25 MG: 0.25 TABLET ORAL at 14:52

## 2025-06-07 RX ADMIN — POTASSIUM PHOSPHATE, MONOBASIC AND POTASSIUM PHOSPHATE, DIBASIC 15 MMOL: 224; 236 INJECTION, SOLUTION, CONCENTRATE INTRAVENOUS at 11:55

## 2025-06-07 RX ADMIN — Medication 10 ML: at 20:48

## 2025-06-07 RX ADMIN — ALPRAZOLAM 0.25 MG: 0.25 TABLET ORAL at 07:44

## 2025-06-07 RX ADMIN — Medication 10 ML: at 09:18

## 2025-06-07 RX ADMIN — AZITHROMYCIN DIHYDRATE 500 MG: 500 INJECTION, POWDER, LYOPHILIZED, FOR SOLUTION INTRAVENOUS at 16:56

## 2025-06-07 NOTE — PROGRESS NOTES
RT EQUIPMENT DEVICE RELATED - SKIN ASSESSMENT    RT Medical Equipment/Device:     NIV Mask:  Under-the-nose   size:  M    Skin Assessment:      Cheek:  Intact  Chin:  Intact  Nose:  Intact  Mouth:  Intact    Device Skin Pressure Protection:  Positioning supports utilized and Pressure points protected    Nurse Notification:  Zayda Murray, RRT

## 2025-06-07 NOTE — PROGRESS NOTES
RT EQUIPMENT DEVICE RELATED - SKIN ASSESSMENT    RT Medical Equipment/Device:     NIV Mask:  Under-the-nose   size:  B    Skin Assessment:      Cheek:  Incision  Chin:  Intact  Ears:  Intact  Nares:  Intact  Lips:  Intact  Mouth:  Intact    Device Skin Pressure Protection:  Pressure points protected    Nurse Notification:  No    Pt has many wounds/scabs all over.    Niles Blackmon, RRT

## 2025-06-07 NOTE — PLAN OF CARE
Goal Outcome Evaluation:  Plan of Care Reviewed With: patient           Outcome Evaluation: Pt continues to refuse bipap. Currenty on RA, tolerating well. Unit on SB in room.

## 2025-06-07 NOTE — PLAN OF CARE
"Goal Outcome Evaluation:              Outcome Evaluation: Patient alert and oriented. 2L NC with no signs of distress. Patient has had multiple bowel movements during shift, incontinence episodes. Patient administered dilaudid bolus per pain pump. Hospitast consulted because patient was upset with only recieving 2.5 Mg of Oxycodone when she takes 10mg at home. Hospitalist increased dose per MAR. Hospitalist also increased Xanax dose per MAR due to patient request. Patient had anxiety and was upset during night stating \"She no longer wanted to do this\" And that she wanted to return home. Patient refused Tube feedings that were due during shift. Spoke to daughter on phone. Patient ignored staff during conversations, and kept removing heart monitor. Will continue plan of care.                             "

## 2025-06-07 NOTE — PROGRESS NOTES
[facility]   Consult Note    Patient Name: Isha Llanes  : 1965  MRN: [id number]  Primary Care Physician:  Virgil Salas MD  Date of admission: 2025    Subjective   Subjective     Reason for Consult: Non-Hodgkin's lymphoma with severe pain    HPI: Ashen known to me with Past history of Hodgkin's disease has come back with severe pain she was recently diagnosed to have a positive non-Hodgkin's lymphoma    Isha Llanes is a 59 y.o. female Patient with severe pain with non-Hodgkin's    Review of Systems   All systems were reviewed and negative except for: Has been reviewed    Personal History     Past Medical History:   Diagnosis Date    Anesthesia     REPORTS HAS GOT REAL EMOTIONAL AND HAS BECOME ANGRY BEFORE    Anxiety     Aortic stenosis, severe     HAS BIO VALVE REPLACED    Arthritis     Asthma     Back pain     Blood clotting disorder     test to find out what type    Cancer     CHF (congestive heart failure)     Chronic low back pain with sciatica     Chronic pain disorder     COPD (chronic obstructive pulmonary disease)     Coronary artery disease     looking to follow with Cardio    Diabetes mellitus     TYPE 2    Dyspnea on effort     Fatty liver     GERD (gastroesophageal reflux disease)     H/O lumpectomy     H/O: hysterectomy     Hiatal hernia     History of degenerative disc disease     History of kidney stones     History of pulmonary embolus (PE)     History of transfusion     AS A CHILD NO TRANSFUSION REACTION    Hodgkin's lymphoma     23  5 YEARS SINCE LAST CHEMO TX    Hypertension     Immobility     Lupus     Nausea vomiting and diarrhea 2024    Panic disorder     Pelvic pain     Peripheral neuropathy     Personal history of DVT (deep vein thrombosis)     over 20 years    PONV (postoperative nausea and vomiting)     Presence of intrathecal pump     PTSD (post-traumatic stress disorder)     Sacroiliac joint disease     SI (sacroiliac) joint inflammation      Venous insufficiency        Past Surgical History:   Procedure Laterality Date    ABDOMINAL SURGERY      BACK SURGERY      SPINAL FUSION     BACK SURGERY      BLADDER REPAIR      BRONCHOSCOPY WITH ION ROBOTIC ASSIST N/A 4/22/2025    Procedure: BRONCHOSCOPY WITH ION ROBOT: REBUS, EBUS, NEEDLE ASPIRATES, BIOPSIES, BRUSHINGS, BAL, WASHINGS: insertion of lighted robot navigated instrument to view inside the lung;  Surgeon: Enzo Segal MD;  Location: [facility] MAIN OR;  Service: Robotics - Pulmonary;  Laterality: N/A;    CARDIAC CATHETERIZATION N/A 03/06/2019    Procedure: Valvuloplasty;  Surgeon: Wayne Johnson MD;  Location: [facility] CATH INVASIVE LOCATION;  Service: Cardiology    CARDIAC CATHETERIZATION      CARDIAC CATHETERIZATION Left 05/31/2023    Procedure: Cardiac Catheterization/Vascular Study;  Surgeon: Poncho Reid MD;  Location: [facility] CATH INVASIVE LOCATION;  Service: Cardiovascular;  Laterality: Left;    CARDIAC SURGERY      Mechanical valve    CARDIAC VALVE REPLACEMENT  2021    CHOLECYSTECTOMY      COLONOSCOPY N/A 12/29/2021    Procedure: COLONOSCOPY;  Surgeon: Karine Orlando MD;  Location: [facility] ENDOSCOPY;  Service: Gastroenterology;  Laterality: N/A;  POOR PREP    COLONOSCOPY N/A 04/13/2022    Procedure: COLONOSCOPY WITH POLYPECTOMY;  Surgeon: Karine Orlando MD;  Location: [facility] ENDOSCOPY;  Service: Gastroenterology;  Laterality: N/A;  COLON POLYP, HEMORRHOIDS, POOR PREP    COLONOSCOPY      DIRECT LARYNGOSCOPY, ESOPHAGOSCOPY, BRONCHOSCOPY N/A 05/22/2023    Procedure: DIRECT LARYNGOSCOPY WITH BIOPSIES , ESOPHAGOSCOPY, BRONCHOSCOPY;  Surgeon: Ronnie Mcgarry MD;  Location: [facility] OR OSC;  Service: ENT;  Laterality: N/A;    ENDOSCOPY N/A 12/29/2021    Procedure: ESOPHAGOGASTRODUODENOSCOPY;  Surgeon: Karine Orlando MD;  Location: [facility] ENDOSCOPY;  Service: Gastroenterology;  Laterality: N/A;  ESOPHAGITIS, GASTRITIS, HIATAL HERNIA    ENDOSCOPY       ENDOSCOPY N/A 04/16/2024    Procedure: ESOPHAGOGASTRODUODENOSCOPY WITH BIOPSIES;  Surgeon: Karine Orlando MD;  Location: [facility] ENDOSCOPY;  Service: Gastroenterology;  Laterality: N/A;  ESOPHAGITIS, HIATAL HERNIA    ENDOSCOPY W/ PEG TUBE PLACEMENT N/A 12/10/2024    Procedure: ESOPHAGOGASTRODUODENOSCOPY WITH PERCUTANEOUS ENDOSCOPIC GASTROSTOMY TUBE INSERTION WITH ANESTHESIA;  Surgeon: Rodger Ortega MD;  Location: [facility] ENDOSCOPY;  Service: Gastroenterology;  Laterality: N/A;  PEG TUBE INSERTION    HYSTERECTOMY      LYMPHADENECTOMY      PAIN PUMP INSERTION/REVISION N/A 10/18/2019    Procedure: PAIN PUMP INSERTION hospitals 10-18[facility]19 [place];  Surgeon: Horace Rios MD;  Location: [facility];  Service: Pain Management    PAIN PUMP INSERTION/REVISION N/A 11/23/2020    Procedure: pain pump removal;  Surgeon: Horace Rios MD;  Location: [facility] OR;  Service: Pain Management;  Laterality: N/A;    PEG TUBE INSERTION N/A 07/26/2023    Procedure: PERCUTANEOUS ENDOSCOPIC GASTROSTOMY TUBE INSERTION;  Surgeon: Kody Mercer MD;  Location: [facility] ENDOSCOPY;  Service: General;  Laterality: N/A;  SUCCESSFUL PEG TUBE PLACE PLACEMENT    PORTACATH PLACEMENT      IN LEFT CHEST REPORTS THAT IT IS NOT FUNCTIONING    SKIN BIOPSY      TONSILLECTOMY      TUBAL ABDOMINAL LIGATION      TUMOR REMOVAL      vaginal /anal area       Family History: family history includes ADD / ADHD in her brother, granddaughter, grandson, and nephew; Anxiety disorder in her mother; Bipolar disorder in her sister; Depression in her sister; Heart failure in her mother; Pancreatic cancer in her paternal grandmother and sister; Prostate cancer in her father; Thyroid cancer in her maternal grandmother. Otherwise pertinent FHx was reviewed and not pertinent to current issue.    Social History:  reports that she has been smoking cigarettes. She started smoking about 46 years ago. She has a 92.9 pack-year smoking history. She has  been exposed to tobacco smoke. She has never used smokeless tobacco. She reports that she does not drink alcohol and does not use drugs.    Home Medications:  ALPRAZolam, Insulin Lispro (1 Unit Dial), PARoxetine, budesonide, famotidine, furosemide, ipratropium-albuterol, loperamide, metoprolol succinate XL, ondansetron ODT, oxyCODONE, pain, and predniSONE      Allergies:  Allergies   Allergen Reactions    Amoxicillin Shortness Of Breath     Has tolerated Cefazolin, Ceftriaxone, and Cefepime -[facility]    Ceclor [Cefaclor] Shortness Of Breath     Has tolerated Cefazolin, Ceftriaxone, and Cefepime -[facility]      Penicillins Shortness Of Breath     Has tolerated Cefazolin, Ceftriaxone, and Cefepime -[facility]    Sulfa Antibiotics Rash    Valium [Diazepam] Mental Status Change     DEPRESSED    Ambien [Zolpidem] Mental Status Change    Aspirin GI Intolerance    Ativan [Lorazepam] Mental Status Change    Benadryl [Diphenhydramine] Anxiety     AND MAKES HER HYPER    Biaxin [Clarithromycin] Unknown - Low Severity    Cephalexin Unknown - Low Severity    Clindamycin Unknown - Low Severity    Compazine [Prochlorperazine Edisylate] Rash    Contrast Dye (Echo Or Unknown Ct/Mr) Other (See Comments)     Caused pain in her arm    Doxycycline Rash    Nsaids GI Intolerance    Phenergan [Promethazine Hcl] GI Intolerance    Promethazine GI Intolerance    Vancomycin Itching       Objective   Objective     Vitals:   Temp:  [98.2 °F (36.8 °C)-98.7 °F (37.1 °C)] 98.7 °F (37.1 °C)  Heart Rate:  [] 113  Resp:  [16-20] 20  BP: ()/(58-73) 88/62  Flow (L/min) (Oxygen Therapy):  [2] 2    Physical Exam    Constitutional: Alert andoriented not in acute distress   Eyes: Pupils equal treatment light and accommodation   HENT: Normocephalic   Neck: No palpable adenopathy   Respiratory: No rales or rhonchi   Cardiovascular: S1 and S2 normal no S3 or S4   Gastrointestinal: No palpable  organomegaly   Musculoskeletal: No deformities   Neurologic: No localizing signs  Skin: No rash      Result Review    Result Review:  I have personally reviewed the results from the time of this admission to 6/10/2025 09:22 EDT and agree with these findings:  [x]  Laboratory  []  Microbiology  [x]  Radiology  []  EKG/Telemetry   []  Cardiology/Vascular   []  Pathology  []  Old records  []  Other:  Most notable findings include: Have been reviewed and discussed    Assessment & Plan   Assessment / Plan     Brief Patient Summary:  Isha Llanes is a 59 y.o. female who Patient with non-Hodgkin's lymphoma    Active Hospital Problems:  Active Hospital Problems    Diagnosis     **Hypercapnia        Plan:   Continue supportive care        Electronically signed by Teodoro Mancuso MD, 06/10/25, 9:22 AM EDT.    Part of this note may be an electronic transcription/translation of spoken language to printed text using the Dragon Dictation System. [facility]   Consult note    Patient Name: Isha Llanes  : 1965  MRN: [id number]  Primary Care Physician:  Virgil Salas MD  Date of admission: 2025    Subjective   Subjective     Chief Complaint: Patient known to me for many years and  she was treated with lymphoma with chemotherapy she subsequently had developed laryngeal cancer was treated with radiation therapy she had developed a pulmonary nodule which is positive for squamous cell lung cancer this has not been addressed with treatment yet patient did develop some lymph nodes in the axilla on the face they have been biopsied and that is consistent with anaplastic large cell lymphoma ALK negative she is not a candidate for very strong chemotherapy, she was seen at [facility] and they offered her chemotherapy but patient walked out that she did not like to be treated there, she came to see me in the office I referred her to [facility] because I felt that patient probably was not a  candidate to get a very aggressive chemotherapy and her prognosis is very poor, she was evaluated [facility] but they would like to get another PET scan also needs to be addressed for radiation treatment plan was to be treated with palliative immunotherapy with Rituxan possibly will need SBRT for the lungs however patient is highly addicted to pain medications and has not been able to be even alert to receive any treatments have explained to the patient again that if she is taking that many pain medications then probably the palliative care would be the option however I am in touch with the [facility] and we are arranging to get her Rituxan therapy as well as possibly SBRT for the lung cancer we will need to get a radiation therapy consult after the patient has stabilized here from her pain management point of view at this time    HPI: Patient with anaplastic large cell lymphoma and active lung cancer    Review of Systems   All systems were reviewed and negative except for: Has been reviewed    Objective   Objective     Vitals:   Temp:  [97.3 °F (36.3 °C)-98.6 °F (37 °C)] 97.8 °F (36.6 °C)  Heart Rate:  [] 106  Resp:  [14-24] 24  BP: ()/(66-95) 148/88  Flow (L/min) (Oxygen Therapy):  [2] 2    Physical Exam    Constitutional: Alert and oriented not in acute distress   Eyes: Pupils equal reacting to light and accommodation   HENT: Normocephalic   Neck: No lymphadenopathy   Respiratory: No rales or rhonchi   Cardiovascular: S1-S2 normal no S3 or S4   Gastrointestinal: No palpable organomegaly she has a feeding tube   Musculoskeletal: No deformities   Neurologic: No localizing signs  Skin: No rash       Result Review    Result Review:  I have personally reviewed the results from the time of this admission to 6/7/2025 10:22 EDT and agree with these findings:  [x]  Laboratory  []  Microbiology  [x]  Radiology  []  EKG/Telemetry   []  Cardiology/Vascular   [x]  Pathology  [x]  Old records  []   Other:  Most notable findings include: Has been reviewed and discussed    Assessment & Plan   Assessment / Plan     Brief Patient Summary:  Isha Llanes is a 59 y.o. female who patient with non-small cell lung cancer history of smoking and drug abuse she also has anaplastic large cell lymphoma ALK negative which needs to be started on treatment    Active Hospital Problems:  Active Hospital Problems    Diagnosis     **Hypercapnia        Plan:   Can get her pain control stabilized and then she will get the treatment as an outpatient if she is able to go and get those    VTE Prophylaxis:  Pharmacologic VTE prophylaxis orders are present.        CODE STATUS:   Code Status (Patient has no pulse and is not breathing): CPR (Attempt to Resuscitate)  Medical Interventions (Patient has pulse or is breathing): Full Support  Level Of Support Discussed With: Patient    Disposition:  I expect patient to be discharged after the patient has been stabilized.    Electronically signed by Teodoro Mancuso MD, 06/07/25, 10:22 AM EDT.      Part of this note may be an electronic transcription/translation of spoken language to printed text using the Dragon Dictation System.

## 2025-06-07 NOTE — PLAN OF CARE
Goal Outcome Evaluation:  Plan of Care Reviewed With: patient        Progress: no change  Outcome Evaluation: Pt remains A&Ox4. VSS. Maintaining SpO2 >90% on 2L NC. Dr. Griffith bedside to discuss care plan. Pt repeatedly asks for pain medication in between episodes of sleep. Pt was repeatedly educated about the potential for respiratory depression/compromise with increased pain med administration. At the time of care plan discussion pt was agreeable to continuing cancer treatment with the understanding that cancer treatment would be the priority of care. This afternoon pt stated she was in so much pain she didn't care any more about her cancer treatment plan and just wanted to be out of pain. MD notified. Continuing plan of care at this time.

## 2025-06-07 NOTE — PROGRESS NOTES
[facility]   Hospitalist Progress Note  Date: 2025  Patient Name: Isha Llanes  : 1965  MRN: [id number]  Date of admission: 2025      Subjective   Subjective     Chief complaint: Shortness of breath     Summary:  59-year-old female with history of chronic pain with history of lymphoma status post chemotherapy, laryngeal cancer status post radiation therapy, squamous cell lung cancer not treated yet, anaplastic large cell lymphoma ALK negative, not a candidate for chemotherapy, in process of transitioning providers, pending outpatient imaging, intrathecal Dilaudid pain pump in situ, aortic valve stenosis status post TAVR, COPD, heart failure preserved ejection fraction, diabetes mellitus, history of DVT, fatty liver disease, anxiety, PTSD, venous insufficiency, hospitalized after shortness of breath and altered mental status with minimal responsiveness, placed on BiPAP, Narcan given, mentation improved, admitted with acute metabolic encephalopathy due to combination of opioids and COPD with acute hypoxemic respiratory failure due to COPD exacerbation with acute hypercapnia respiratory failure due to COPD and opioid use, palliative care consulted to establish goals of care, hematology consulted.  Pain control ongoing issue     Interval follow-up: Patient seen and examined, still uncomfortable, oxycodone increased overnight.  More talkative today.  Indicates she wants to pursue treatment at some point but needs to get stronger.  Hematology consulted.  Discussed with Dr. Mancuso.  Magnesium 1.5, phosphorus 1.3, potassium 2.7, sodium 131, white blood cell count 7000, hemoglobin 12.7.  She occasionally uses her PEG tube for supplemental feeds if her appetite is poor.  PO2 99% on 2 L.  Tachycardia remains.  Diarrhea less.  Discussed plan with patient's daughter.     Review of systems:  All systems reviewed and negative except weakness, fatigue, chronic pain    Objective   Objective     Vitals:    Temp:  [97.3 °F (36.3 °C)-98.6 °F (37 °C)] 97.8 °F (36.6 °C)  Heart Rate:  [] 106  Resp:  [14-24] 24  BP: ()/(66-88) 148/88  Flow (L/min) (Oxygen Therapy):  [2] 2  Physical Exam               Constitutional: Chronically ill-appearing.              Eyes: Pupils equal, sclerae anicteric, no conjunctival injection              HENT: NCAT, mucous membranes moist              Neck: Supple, full range of motion              Respiratory: Diffuse wheezing              Cardiovascular: RRR, no murmurs, rubs, or gallops, palpable pedal pulses bilaterally              Gastrointestinal: Positive bowel sounds, soft, nontender, nondistended              Musculoskeletal: Bilateral venous changes.  She does have a couple of open lesions.  No streaking erythema up the leg.  No warmth.  Chronic              Psychiatric: Appropriate affect, cooperative              Neurologic: Oriented x 3, strength symmetric in all extremities, Cranial Nerves grossly intact to confrontation, speech clear              Skin: No rashes     Result Review    Result Review:  I have personally reviewed the pertinent results from the past 24 hours to 6/7/2025 11:31 EDT and agree with these findings:  [x]  Laboratory   CBC          6/5/2025    11:06 6/6/2025    04:35 6/7/2025    04:58   CBC   WBC 7.91  9.37  7.94    RBC 4.81  5.06  5.10    Hemoglobin 12.2  12.6  12.7    Hematocrit 41.2  42.5  42.0    MCV 85.7  84.0  82.4    MCH 25.4  24.9  24.9    MCHC 29.6  29.6  30.2    RDW 14.9  14.8  14.9    Platelets 150  180  156      BMP          6/5/2025    11:06 6/6/2025    04:35 6/7/2025    04:58   BMP   BUN 15.1  11.9  7.3    Creatinine 0.36  0.32  0.30    Sodium 136  137  131    Potassium 3.7  4.1  3.7    Chloride 97  98  95    CO2 30.6  28.5  23.2    Calcium 8.2  8.4  8.2      LIVER FUNCTION TESTS:      Lab 06/07/25  0458 06/06/25  0435 06/05/25  1106   TOTAL PROTEIN 5.7* 5.9* 6.0   ALBUMIN 3.0* 3.0* 3.2*   GLOBULIN  --  2.9 2.8   ALT (SGPT) 10 12 9    AST (SGOT) 13 13 10   BILIRUBIN 0.5 0.3 0.3   INDIRECT BILIRUBIN 0.3  --   --    BILIRUBIN DIRECT 0.2  --   --    ALK PHOS 119* 129* 129*   LIPASE  --   --  66*       [x]  Microbiology   Microbiology Results (last 10 days)       Procedure Component Value - Date/Time    Clostridioides difficile Toxin - Stool, Per Rectum [[id number]] Collected: 06/06/25 1205    Lab Status: Final result Specimen: Stool from Per Rectum Updated: 06/06/25 1250    Narrative:      The following orders were created for panel order Clostridioides difficile Toxin - Stool, Per Rectum.  Procedure                               Abnormality         Status                     ---------                               -----------         ------                     Clostridioides difficile...[[id number]]                      Final result                 Please view results for these tests on the individual orders.    Clostridioides difficile Toxin, PCR - Stool, Per Rectum [[id number]] Collected: 06/06/25 1205    Lab Status: Final result Specimen: Stool from Per Rectum Updated: 06/06/25 1250     Toxigenic C. difficile by PCR Negative     027 Toxin Presumptive Negative    Narrative:      The result indicates the absence of toxigenic C. difficile from stool specimen.     Blood Culture - Blood, Arm, Right [[id number]]  (Normal) Collected: 06/05/25 1402    Lab Status: Preliminary result Specimen: Blood from Arm, Right Updated: 06/06/25 1415     Blood Culture No growth at 24 hours    Blood Culture - Blood, Arm, Left [[id number]]  (Normal) Collected: 06/05/25 1402    Lab Status: Preliminary result Specimen: Blood from Arm, Left Updated: 06/06/25 1415     Blood Culture No growth at 24 hours    S. Pneumo Ag Urine or CSF - Urine, Straight Cath [[id number]]  (Normal) Collected: 06/05/25 1343    Lab Status: Final result Specimen: Urine from Straight Cath Updated: 06/05/25 1558     Strep Pneumo Ag Negative    Legionella Antigen, Urine - Urine, Straight Cath [[id number]]   (Normal) Collected: 06/05/25 1343    Lab Status: Edited Result - FINAL Specimen: Urine from Straight Cath Updated: 06/05/25 1626     LEGIONELLA ANTIGEN, URINE Negative    Mycoplasma Pneumoniae Antibody, IgM - Blood, [[id number]]  (Normal) Collected: 06/05/25 1106    Lab Status: Final result Specimen: Blood Updated: 06/05/25 1619     Mycoplasma pneumo IgM Negative              [x]  Radiology XR Chest 1 View  Result Date: 6/5/2025  Impression: Stable chest examination without acute cardiopulmonary process. Electronically Signed: Mikayla Linares MD  6/5/2025 11:06 AM EDT  Workstation ID: [id number]        [x]  EKG/Telemetry   ECG 12 Lead ED Triage Standing Order; Chest Pain   Final Result   HEART EPPS=129  bpm   RR Idrtinhb=468  ms   VA Hbjanfel=752  ms   P Horizontal Axis=5  deg   P Front Axis=68  deg   QRSD Aucjgwfh=242  ms   QT Vjiojexl=751  ms   AMqD=014  ms   QRS Axis=31  deg   T Wave Axis=159  deg   - ABNORMAL ECG -   Sinus tachycardia   Probable  left atrial enlargement   Left bundle branch block   ST elevation secondary to IVCD   When compared with ECG of 06-May-2025 18:01:50,   Significant rate increase   Electronically Signed By: Rehan Barrera ([facility]) 2025-06-05 15:13:11   Date and Time of Study:2025-06-05 10:36:59          []  Cardiology/Vascular   []  Pathology  [x]  Old records  []  Other:    Assessment & Plan   Assessment / Plan     Assessment/Plan:    Assessment:  Acute metabolic encephalopathy due to opioids and COPD  Acute hypoxic respiratory failure due to COPD  Acute hypercapnic respiratory failure due to COPD and opioid use  Type 2 diabetes with hyperglycemia  Heart failure without exacerbation  Hodgkin's lymphoma in 2008  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  Anaplastic large cell lymphoma    Plan:  Labs and imaging reviewed  Patient okay to use Dilaudid intrathecal pump  Increase oxycodone to 5 mg every 4 hours for severe  pain  Consulted hematology oncology discussed with Dr. Mancuso, recommendations appreciated  Continue azithromycin  Imodium for diarrhea  Continue Brovana Pulmicort nebs twice daily  Wean oxygen per tolerance  Continue prednisone 40 mg daily  Continue Paxil 20 mg daily  Continue metoprolol XL 25 mg nightly  Resume Lasix 20 mg daily  PT/OT  Xanax 0.125 mg 3 times a day as needed for anxiety  A.m. labs  Full code  DVT prophylaxis with Lovenox  Clinical course to dictate further management  Discussed with nurse at the bedside    VTE Prophylaxis:  Pharmacologic VTE prophylaxis orders are present.        CODE STATUS:   Code Status (Patient has no pulse and is not breathing): CPR (Attempt to Resuscitate)  Medical Interventions (Patient has pulse or is breathing): Full Support  Level Of Support Discussed With: Patient        Electronically signed by Karen Griffith MD, 6/7/2025, 11:31 EDT.    Portions of this documentation were transcribed electronically from a voice recognition software.  I confirm all data accurately represents the service(s) I performed at today's visit.

## 2025-06-07 NOTE — PLAN OF CARE
Goal Outcome Evaluation:      Patient refused to wear BiPAP for me tonight and started one treatment and stopped it shortly after stating it made her feel bad. I explained the importance and continued to monitor patients oxygenation throughout the night

## 2025-06-08 ENCOUNTER — APPOINTMENT (OUTPATIENT)
Dept: CT IMAGING | Facility: HOSPITAL | Age: 60
DRG: 189 | End: 2025-06-08
Payer: MEDICAID

## 2025-06-08 LAB
ALBUMIN SERPL-MCNC: 3.1 G/DL (ref 3.5–5.2)
ALP SERPL-CCNC: 107 U/L (ref 39–117)
ALT SERPL W P-5'-P-CCNC: 10 U/L (ref 1–33)
ANION GAP SERPL CALCULATED.3IONS-SCNC: 12.7 MMOL/L (ref 5–15)
AST SERPL-CCNC: 12 U/L (ref 1–32)
BASOPHILS # BLD AUTO: 0 10*3/MM3 (ref 0–0.2)
BASOPHILS NFR BLD AUTO: 0 % (ref 0–1.5)
BILIRUB CONJ SERPL-MCNC: 0.2 MG/DL (ref 0–0.3)
BILIRUB INDIRECT SERPL-MCNC: 0.4 MG/DL
BILIRUB SERPL-MCNC: 0.6 MG/DL (ref 0–1.2)
BUN SERPL-MCNC: 6.8 MG/DL (ref 6–20)
BUN/CREAT SERPL: 18.4 (ref 7–25)
CALCIUM SPEC-SCNC: 8.3 MG/DL (ref 8.6–10.5)
CHLORIDE SERPL-SCNC: 94 MMOL/L (ref 98–107)
CO2 SERPL-SCNC: 24.3 MMOL/L (ref 22–29)
CREAT SERPL-MCNC: 0.37 MG/DL (ref 0.57–1)
DEPRECATED RDW RBC AUTO: 45.1 FL (ref 37–54)
EGFRCR SERPLBLD CKD-EPI 2021: 116.3 ML/MIN/1.73
EOSINOPHIL # BLD AUTO: 0.02 10*3/MM3 (ref 0–0.4)
EOSINOPHIL NFR BLD AUTO: 0.3 % (ref 0.3–6.2)
ERYTHROCYTE [DISTWIDTH] IN BLOOD BY AUTOMATED COUNT: 15.2 % (ref 12.3–15.4)
GLUCOSE BLDC GLUCOMTR-MCNC: 159 MG/DL (ref 70–99)
GLUCOSE BLDC GLUCOMTR-MCNC: 184 MG/DL (ref 70–99)
GLUCOSE BLDC GLUCOMTR-MCNC: 246 MG/DL (ref 70–99)
GLUCOSE BLDC GLUCOMTR-MCNC: 332 MG/DL (ref 70–99)
GLUCOSE SERPL-MCNC: 116 MG/DL (ref 65–99)
HCT VFR BLD AUTO: 43 % (ref 34–46.6)
HGB BLD-MCNC: 13.3 G/DL (ref 12–15.9)
IMM GRANULOCYTES # BLD AUTO: 0.05 10*3/MM3 (ref 0–0.05)
IMM GRANULOCYTES NFR BLD AUTO: 0.7 % (ref 0–0.5)
LYMPHOCYTES # BLD AUTO: 0.4 10*3/MM3 (ref 0.7–3.1)
LYMPHOCYTES NFR BLD AUTO: 5.9 % (ref 19.6–45.3)
MAGNESIUM SERPL-MCNC: 1.7 MG/DL (ref 1.6–2.6)
MCH RBC QN AUTO: 25.2 PG (ref 26.6–33)
MCHC RBC AUTO-ENTMCNC: 30.9 G/DL (ref 31.5–35.7)
MCV RBC AUTO: 81.6 FL (ref 79–97)
MONOCYTES # BLD AUTO: 0.5 10*3/MM3 (ref 0.1–0.9)
MONOCYTES NFR BLD AUTO: 7.4 % (ref 5–12)
NEUTROPHILS NFR BLD AUTO: 5.79 10*3/MM3 (ref 1.7–7)
NEUTROPHILS NFR BLD AUTO: 85.7 % (ref 42.7–76)
NRBC BLD AUTO-RTO: 0 /100 WBC (ref 0–0.2)
PHOSPHATE SERPL-MCNC: 2.4 MG/DL (ref 2.5–4.5)
PLATELET # BLD AUTO: 152 10*3/MM3 (ref 140–450)
PMV BLD AUTO: 11 FL (ref 6–12)
POTASSIUM SERPL-SCNC: 4 MMOL/L (ref 3.5–5.2)
PROT SERPL-MCNC: 5.7 G/DL (ref 6–8.5)
RBC # BLD AUTO: 5.27 10*6/MM3 (ref 3.77–5.28)
SODIUM SERPL-SCNC: 131 MMOL/L (ref 136–145)
TROPONIN T SERPL HS-MCNC: 17 NG/L
WBC NRBC COR # BLD AUTO: 6.76 10*3/MM3 (ref 3.4–10.8)

## 2025-06-08 PROCEDURE — 63710000001 PREDNISONE PER 1 MG: Performed by: INTERNAL MEDICINE

## 2025-06-08 PROCEDURE — 70491 CT SOFT TISSUE NECK W/DYE: CPT

## 2025-06-08 PROCEDURE — 84100 ASSAY OF PHOSPHORUS: CPT | Performed by: FAMILY MEDICINE

## 2025-06-08 PROCEDURE — 94761 N-INVAS EAR/PLS OXIMETRY MLT: CPT

## 2025-06-08 PROCEDURE — 25010000002 DIPHENHYDRAMINE PER 50 MG: Performed by: FAMILY MEDICINE

## 2025-06-08 PROCEDURE — 25010000002 ONDANSETRON PER 1 MG: Performed by: FAMILY MEDICINE

## 2025-06-08 PROCEDURE — 85025 COMPLETE CBC W/AUTO DIFF WBC: CPT | Performed by: FAMILY MEDICINE

## 2025-06-08 PROCEDURE — 25510000001 IOPAMIDOL PER 1 ML: Performed by: FAMILY MEDICINE

## 2025-06-08 PROCEDURE — 80076 HEPATIC FUNCTION PANEL: CPT | Performed by: FAMILY MEDICINE

## 2025-06-08 PROCEDURE — 80048 BASIC METABOLIC PNL TOTAL CA: CPT | Performed by: FAMILY MEDICINE

## 2025-06-08 PROCEDURE — 99232 SBSQ HOSP IP/OBS MODERATE 35: CPT | Performed by: FAMILY MEDICINE

## 2025-06-08 PROCEDURE — 93010 ELECTROCARDIOGRAM REPORT: CPT | Performed by: INTERNAL MEDICINE

## 2025-06-08 PROCEDURE — 84484 ASSAY OF TROPONIN QUANT: CPT | Performed by: PHYSICIAN ASSISTANT

## 2025-06-08 PROCEDURE — 94799 UNLISTED PULMONARY SVC/PX: CPT

## 2025-06-08 PROCEDURE — 93005 ELECTROCARDIOGRAM TRACING: CPT | Performed by: PHYSICIAN ASSISTANT

## 2025-06-08 PROCEDURE — 83735 ASSAY OF MAGNESIUM: CPT | Performed by: FAMILY MEDICINE

## 2025-06-08 PROCEDURE — 25810000003 SODIUM CHLORIDE 0.9 % SOLUTION: Performed by: FAMILY MEDICINE

## 2025-06-08 PROCEDURE — 25010000002 HYDROMORPHONE 1 MG/ML SOLUTION: Performed by: FAMILY MEDICINE

## 2025-06-08 PROCEDURE — 82948 REAGENT STRIP/BLOOD GLUCOSE: CPT | Performed by: INTERNAL MEDICINE

## 2025-06-08 PROCEDURE — 63710000001 INSULIN LISPRO (HUMAN) PER 5 UNITS: Performed by: INTERNAL MEDICINE

## 2025-06-08 PROCEDURE — 71260 CT THORAX DX C+: CPT

## 2025-06-08 PROCEDURE — 82948 REAGENT STRIP/BLOOD GLUCOSE: CPT

## 2025-06-08 RX ORDER — GABAPENTIN 100 MG/1
200 CAPSULE ORAL EVERY 8 HOURS SCHEDULED
Status: DISCONTINUED | OUTPATIENT
Start: 2025-06-08 | End: 2025-06-09

## 2025-06-08 RX ORDER — TRIAMCINOLONE ACETONIDE 1 MG/G
1 OINTMENT TOPICAL EVERY 12 HOURS SCHEDULED
Status: COMPLETED | OUTPATIENT
Start: 2025-06-08 | End: 2025-06-08

## 2025-06-08 RX ORDER — ACYCLOVIR 200 MG/1
800 CAPSULE ORAL
Status: DISCONTINUED | OUTPATIENT
Start: 2025-06-08 | End: 2025-06-09

## 2025-06-08 RX ORDER — OXYCODONE HYDROCHLORIDE 5 MG/1
10 TABLET ORAL EVERY 8 HOURS PRN
Refills: 0 | Status: DISCONTINUED | OUTPATIENT
Start: 2025-06-08 | End: 2025-06-09

## 2025-06-08 RX ORDER — FENTANYL/ROPIVACAINE/NS/PF 2-625MCG/1
15 PLASTIC BAG, INJECTION (ML) EPIDURAL ONCE
Status: COMPLETED | OUTPATIENT
Start: 2025-06-08 | End: 2025-06-08

## 2025-06-08 RX ORDER — DIPHENHYDRAMINE HYDROCHLORIDE 50 MG/ML
25 INJECTION, SOLUTION INTRAMUSCULAR; INTRAVENOUS EVERY 6 HOURS PRN
Status: DISCONTINUED | OUTPATIENT
Start: 2025-06-08 | End: 2025-06-11 | Stop reason: HOSPADM

## 2025-06-08 RX ORDER — IOPAMIDOL 755 MG/ML
100 INJECTION, SOLUTION INTRAVASCULAR
Status: COMPLETED | OUTPATIENT
Start: 2025-06-08 | End: 2025-06-08

## 2025-06-08 RX ORDER — FENTANYL 12.5 UG/1
1 PATCH TRANSDERMAL
Refills: 0 | Status: DISCONTINUED | OUTPATIENT
Start: 2025-06-08 | End: 2025-06-09

## 2025-06-08 RX ADMIN — TRIAMCINOLONE ACETONIDE 1 APPLICATION: 1 OINTMENT TOPICAL at 14:12

## 2025-06-08 RX ADMIN — ACETAMINOPHEN 650 MG: 325 TABLET ORAL at 04:16

## 2025-06-08 RX ADMIN — DIPHENHYDRAMINE HYDROCHLORIDE 25 MG: 50 INJECTION, SOLUTION INTRAMUSCULAR; INTRAVENOUS at 14:36

## 2025-06-08 RX ADMIN — HYDROMORPHONE HYDROCHLORIDE 0.5 MG: 1 INJECTION, SOLUTION INTRAMUSCULAR; INTRAVENOUS; SUBCUTANEOUS at 08:46

## 2025-06-08 RX ADMIN — OXYCODONE 10 MG: 5 TABLET ORAL at 21:07

## 2025-06-08 RX ADMIN — PREDNISONE 40 MG: 20 TABLET ORAL at 08:48

## 2025-06-08 RX ADMIN — POTASSIUM PHOSPHATE, MONOBASIC AND POTASSIUM PHOSPHATE, DIBASIC 15 MMOL: 224; 236 INJECTION, SOLUTION, CONCENTRATE INTRAVENOUS at 08:49

## 2025-06-08 RX ADMIN — ACYCLOVIR 800 MG: 200 CAPSULE ORAL at 14:12

## 2025-06-08 RX ADMIN — NICOTINE 1 PATCH: 21 PATCH, EXTENDED RELEASE TRANSDERMAL at 08:49

## 2025-06-08 RX ADMIN — ALPRAZOLAM 0.25 MG: 0.25 TABLET ORAL at 00:09

## 2025-06-08 RX ADMIN — ONDANSETRON 4 MG: 2 INJECTION INTRAMUSCULAR; INTRAVENOUS at 04:20

## 2025-06-08 RX ADMIN — ONDANSETRON 4 MG: 2 INJECTION INTRAMUSCULAR; INTRAVENOUS at 16:40

## 2025-06-08 RX ADMIN — INSULIN LISPRO 3 UNITS: 100 INJECTION, SOLUTION INTRAVENOUS; SUBCUTANEOUS at 16:40

## 2025-06-08 RX ADMIN — OXYCODONE 10 MG: 5 TABLET ORAL at 03:07

## 2025-06-08 RX ADMIN — TRIAMCINOLONE ACETONIDE 1 APPLICATION: 1 OINTMENT TOPICAL at 21:11

## 2025-06-08 RX ADMIN — GABAPENTIN 200 MG: 100 CAPSULE ORAL at 18:39

## 2025-06-08 RX ADMIN — OXYCODONE 5 MG: 5 TABLET ORAL at 18:17

## 2025-06-08 RX ADMIN — HYDROXYZINE HYDROCHLORIDE 25 MG: 25 TABLET, FILM COATED ORAL at 04:16

## 2025-06-08 RX ADMIN — ALPRAZOLAM 0.25 MG: 0.25 TABLET ORAL at 17:41

## 2025-06-08 RX ADMIN — INSULIN LISPRO 5 UNITS: 100 INJECTION, SOLUTION INTRAVENOUS; SUBCUTANEOUS at 21:04

## 2025-06-08 RX ADMIN — ACYCLOVIR 800 MG: 200 CAPSULE ORAL at 21:05

## 2025-06-08 RX ADMIN — HYDROXYZINE HYDROCHLORIDE 25 MG: 25 TABLET, FILM COATED ORAL at 21:05

## 2025-06-08 RX ADMIN — ALPRAZOLAM 0.25 MG: 0.25 TABLET ORAL at 09:40

## 2025-06-08 RX ADMIN — Medication 10 ML: at 08:49

## 2025-06-08 RX ADMIN — ONDANSETRON 4 MG: 2 INJECTION INTRAMUSCULAR; INTRAVENOUS at 22:47

## 2025-06-08 RX ADMIN — INSULIN LISPRO 2 UNITS: 100 INJECTION, SOLUTION INTRAVENOUS; SUBCUTANEOUS at 14:12

## 2025-06-08 RX ADMIN — FENTANYL 1 PATCH: 12 PATCH TRANSDERMAL at 11:53

## 2025-06-08 RX ADMIN — Medication 10 ML: at 21:00

## 2025-06-08 RX ADMIN — INSULIN LISPRO 2 UNITS: 100 INJECTION, SOLUTION INTRAVENOUS; SUBCUTANEOUS at 08:49

## 2025-06-08 RX ADMIN — IOPAMIDOL 100 ML: 755 INJECTION, SOLUTION INTRAVENOUS at 16:11

## 2025-06-08 RX ADMIN — Medication: at 21:06

## 2025-06-08 RX ADMIN — OXYCODONE 5 MG: 5 TABLET ORAL at 14:19

## 2025-06-08 RX ADMIN — OXYCODONE 10 MG: 5 TABLET ORAL at 07:29

## 2025-06-08 RX ADMIN — ACYCLOVIR 800 MG: 200 CAPSULE ORAL at 17:41

## 2025-06-08 NOTE — SIGNIFICANT NOTE
06/08/25 1031   OTHER   Discipline occupational therapist   Rehab Time/Intention   Session Not Performed patient/family declined evaluation  (Pt declined assessment due to high pain. Nsg aware of pain. Plan to re-attempt assessment on 6/9/25.)

## 2025-06-08 NOTE — PLAN OF CARE
Goal Outcome Evaluation:  Plan of Care Reviewed With: patient        Progress: no change  Outcome Evaluation: Patient refused to wear BIPAP last night. She is currently on 2l nasal cannula.

## 2025-06-08 NOTE — PROGRESS NOTES
[facility]     Progress Note    Patient Name: Isha Llanes  : 1965  MRN: [id number]  Primary Care Physician:  Virgil Salas MD  Date of admission: 2025    Subjective   Subjective     Chief Complaint: Discussed at length with the patient complaining of pain and asking for many medications I have explained to her that we are giving her the maximum dose we cannot give her any more than that if she needs to be treated more than this treatment would be palliative care but she has now agreed not to take that much medications as she is concerned about it we will get a CT scan of the chest and neck patient will be started on Zovirax for possible shingles will also give her some gabapentin    HPI: Patient has already been seen at the [facility] and they will be treating her with Rituxan as well as she will probably need SBRT for the lung lesion    Review of Systems   All systems were reviewed and negative except for: Has been reviewed    Objective   Objective     Vitals:   Temp:  [97.7 °F (36.5 °C)-98.2 °F (36.8 °C)] 97.7 °F (36.5 °C)  Heart Rate:  [] 101  Resp:  [22-24] 22  BP: ()/(68-76) 100/73  Flow (L/min) (Oxygen Therapy):  [2] 2    Physical Exam    Constitutional: Alert and oriented not in acute distress   Eyes: Pupils equal reactive to light and accommodation   HENT: Normocephalic   Neck: No lymphadenopathy   Respiratory: No rales or rhonchi   Cardiovascular: S1-S2 normal no S3 or S4   Gastrointestinal: No palpable organomegaly   Musculoskeletal: No deformities   Neurologic: No localizing signs  Skin: she has on the back which appears to be because of herpes zoster       Result Review    Result Review:  I have personally reviewed the results from the time of this admission to 2025 12:35 EDT and agree with these findings:  [x]  Laboratory  []  Microbiology  []  Radiology  []  EKG/Telemetry   []  Cardiology/Vascular   []  Pathology  []  Old records  []  Other:  Most  notable findings include: Have been reviewed and discussed    Assessment & Plan   Assessment / Plan     Brief Patient Summary:  Isha Llanes is a 59 y.o. female who have explained to her regarding the pain medication had extensive discussion with her daughter    Active Hospital Problems:  Active Hospital Problems    Diagnosis     **Hypercapnia        Plan:   Has agreed to cut down the pain medication    VTE Prophylaxis:  Pharmacologic VTE prophylaxis orders are present.        CODE STATUS:   Code Status (Patient has no pulse and is not breathing): CPR (Attempt to Resuscitate)  Medical Interventions (Patient has pulse or is breathing): Full Support  Level Of Support Discussed With: Patient    Disposition:  I expect patient to be discharged to the patient is stabilized.    Electronically signed by Teodoro Mancuso MD, 06/08/25, 12:35 PM EDT.      Part of this note may be an electronic transcription/translation of spoken language to printed text using the Dragon Dictation System.

## 2025-06-08 NOTE — PLAN OF CARE
Goal Outcome Evaluation:              Outcome Evaluation: Patient remains on 2LNC. Patient has continued to refuse BIPAP and has refused breathing treatments throughout the night. Will continue to encourage use of BIPAP and breathing txs.

## 2025-06-08 NOTE — NURSING NOTE
"Patient approached several times by PCA Deepa and RN Ragini to get vitals taken, patient refuses, stating \"not until I get my fucking pain medicine.\" Patient educated on dosage schedule and told she is not due for PRN medication yet as it is too soon since last dose, patient requested ice cream. Will provide ordered pain medication when scheduled and reattempt vitals then.   "

## 2025-06-08 NOTE — PLAN OF CARE
Goal Outcome Evaluation:           Progress: no change  Outcome Evaluation: Patient AOx4. Room air. Continuous pulse ox in place. This morning Patient stated her pain is unrelieved regardless of the amount of pain medication she is getting. MD notified when patient demanded he come see her ASAP because she wanted to do comfort measures so she could have more pain medicine. Patient then changes her mind. One dose of IV dilaudid given. Fentanyl patch ordered.   Dr. Mancuso came to see patient, and told her no more pain medication unless she wants to die. Patient is already on heavy narcotics and requests more. Dr. Mancuso gave her the option for comfort measures but patient did not want to die. After Bartolome denied any further pain medication patient stated that she would just commit suicide. Patients daughter took her purse and stated that she was worried about her safety. MD put in for close watch. Called security to retrieve patient belongings as patient was guarding her purse, patients home narcotics found in purse and taken to pharmacy. Security itemized valuables in purse with RN. Patient then refused to sign securitys belongings form. RN witnessed for security.

## 2025-06-08 NOTE — NURSING NOTE
Patient had complaint of chest pain, Aliya WEI made aware. Chest pain protocol put into place per orders.

## 2025-06-08 NOTE — PROGRESS NOTES
[facility]   Hospitalist Progress Note  Date: 2025  Patient Name: Isha Llanes  : 1965  MRN: [id number]  Date of admission: 2025      Subjective   Subjective   Chief complaint: Shortness of breath     Summary:  59-year-old female with history of chronic pain with history of lymphoma status post chemotherapy, laryngeal cancer status post radiation therapy, squamous cell lung cancer not treated yet, anaplastic large cell lymphoma ALK negative, not a candidate for chemotherapy, in process of transitioning providers, pending outpatient imaging, intrathecal Dilaudid pain pump in situ, aortic valve stenosis status post TAVR, COPD, heart failure preserved ejection fraction, diabetes mellitus, history of DVT, fatty liver disease, anxiety, PTSD, venous insufficiency, hospitalized after shortness of breath and altered mental status with minimal responsiveness, placed on BiPAP, Narcan given, mentation improved, admitted with acute metabolic encephalopathy due to combination of opioids and COPD with acute hypoxemic respiratory failure due to COPD exacerbation with acute hypercapnia respiratory failure due to COPD and opioid use, palliative care consulted to establish goals of care, hematology consulted.  Pain control ongoing issue     Interval follow-up: Patient seen and examined, reportedly did not feel well overnight, patient contemplating going comfort measures only.  Encourage patient to have a conversation with her daughter and family to determine her goals of care.  Oncology following.  Fentanyl patch ordered to achieve better pain control.  Dilaudid intrathecal pump not helping.  Spot dosing oxycodone not helping.  Creatinine 0.37, potassium 4.0, sodium 131, his white blood count 6000, hemoglobin 13.3.  Anxious this morning.     Review of systems:  All systems reviewed and negative except weakness, fatigue, chronic pain, anxiety    Objective   Objective     Vitals:   Temp:  [97.6 °F (36.4  °C)-98.2 °F (36.8 °C)] 97.7 °F (36.5 °C)  Heart Rate:  [] 101  Resp:  [22-24] 22  BP: ()/(68-80) 100/73  Flow (L/min) (Oxygen Therapy):  [2] 2  Physical Exam               Constitutional: Chronically ill-appearing.              Eyes: Pupils equal, sclerae anicteric, no conjunctival injection              HENT: NCAT, mucous membranes moist              Neck: Supple, full range of motion              Respiratory: Diffuse wheezing              Cardiovascular: RRR, no murmurs, rubs, or gallops, palpable pedal pulses bilaterally              Gastrointestinal: Positive bowel sounds, soft, nontender, nondistended              Musculoskeletal: Bilateral venous changes.  She does have a couple of open lesions.  No streaking erythema up the leg.  No warmth.  Chronic              Psychiatric: Appropriate affect, cooperative, anxious              Neurologic: Oriented x 3, strength symmetric in all extremities, Cranial Nerves grossly intact to confrontation, speech clear              Skin: No rashes     Result Review    Result Review:  I have personally reviewed the pertinent results from the past 24 hours to 6/8/2025 10:38 EDT and agree with these findings:  [x]  Laboratory   CBC          6/6/2025    04:35 6/7/2025    04:58 6/8/2025    05:10   CBC   WBC 9.37  7.94  6.76    RBC 5.06  5.10  5.27    Hemoglobin 12.6  12.7  13.3    Hematocrit 42.5  42.0  43.0    MCV 84.0  82.4  81.6    MCH 24.9  24.9  25.2    MCHC 29.6  30.2  30.9    RDW 14.8  14.9  15.2    Platelets 180  156  152      BMP          6/6/2025    04:35 6/7/2025    04:58 6/8/2025    05:10   BMP   BUN 11.9  7.3  6.8    Creatinine 0.32  0.30  0.37    Sodium 137  131  131    Potassium 4.1  3.7  4.0    Chloride 98  95  94    CO2 28.5  23.2  24.3    Calcium 8.4  8.2  8.3      LIVER FUNCTION TESTS:      Lab 06/08/25  0510 06/07/25  0458 06/06/25  0435 06/05/25  1106   TOTAL PROTEIN 5.7* 5.7* 5.9* 6.0   ALBUMIN 3.1* 3.0* 3.0* 3.2*   GLOBULIN  --   --  2.9 2.8   ALT  (SGPT) 10 10 12 9   AST (SGOT) 12 13 13 10   BILIRUBIN 0.6 0.5 0.3 0.3   INDIRECT BILIRUBIN 0.4 0.3  --   --    BILIRUBIN DIRECT 0.2 0.2  --   --    ALK PHOS 107 119* 129* 129*   LIPASE  --   --   --  66*       [x]  Microbiology   Microbiology Results (last 10 days)       Procedure Component Value - Date/Time    Clostridioides difficile Toxin - Stool, Per Rectum [[id number]] Collected: 06/06/25 1205    Lab Status: Final result Specimen: Stool from Per Rectum Updated: 06/06/25 1250    Narrative:      The following orders were created for panel order Clostridioides difficile Toxin - Stool, Per Rectum.  Procedure                               Abnormality         Status                     ---------                               -----------         ------                     Clostridioides difficile...[[id number]]                      Final result                 Please view results for these tests on the individual orders.    Clostridioides difficile Toxin, PCR - Stool, Per Rectum [[id number]] Collected: 06/06/25 1205    Lab Status: Final result Specimen: Stool from Per Rectum Updated: 06/06/25 1250     Toxigenic C. difficile by PCR Negative     027 Toxin Presumptive Negative    Narrative:      The result indicates the absence of toxigenic C. difficile from stool specimen.     Blood Culture - Blood, Arm, Right [[id number]]  (Normal) Collected: 06/05/25 1402    Lab Status: Preliminary result Specimen: Blood from Arm, Right Updated: 06/07/25 1415     Blood Culture No growth at 2 days    Blood Culture - Blood, Arm, Left [[id number]]  (Normal) Collected: 06/05/25 1402    Lab Status: Preliminary result Specimen: Blood from Arm, Left Updated: 06/07/25 1415     Blood Culture No growth at 2 days    S. Pneumo Ag Urine or CSF - Urine, Straight Cath [[id number]]  (Normal) Collected: 06/05/25 1343    Lab Status: Final result Specimen: Urine from Straight Cath Updated: 06/05/25 1558     Strep Pneumo Ag Negative    Legionella Antigen,  Urine - Urine, Straight Cath [[id number]]  (Normal) Collected: 06/05/25 1343    Lab Status: Edited Result - FINAL Specimen: Urine from Straight Cath Updated: 06/05/25 1626     LEGIONELLA ANTIGEN, URINE Negative    Mycoplasma Pneumoniae Antibody, IgM - Blood, [[id number]]  (Normal) Collected: 06/05/25 1106    Lab Status: Final result Specimen: Blood Updated: 06/05/25 1619     Mycoplasma pneumo IgM Negative              [x]  Radiology XR Chest 1 View  Result Date: 6/7/2025  Emphysema. No active disease. Electronically Signed: Des Carlson MD  6/7/2025 9:24 PM EDT  Workstation ID: [id number]    XR Chest 1 View  Result Date: 6/5/2025  Impression: Stable chest examination without acute cardiopulmonary process. Electronically Signed: Mikayla Linares MD  6/5/2025 11:06 AM EDT  Workstation ID: [id number]        [x]  EKG/Telemetry   ECG 12 Lead Chest Pain   Preliminary Result   HEART RATE=99  bpm   RR Cqmroizt=993  ms   VA Rseheidx=111  ms   P Horizontal Axis=-10  deg   P Front Axis=72  deg   QRSD Quyucwas=802  ms   QT Jtaoytem=766  ms   YDwK=469  ms   QRS Axis=36  deg   T Wave Axis=146  deg   - ABNORMAL ECG -   Sinus rhythm   Left atrial enlargement   Left bundle branch block   ST elevation secondary to IVCD   Date and Time of Study:2025-06-07 21:11:31      ECG 12 Lead ED Triage Standing Order; Chest Pain   Final Result   HEART PHYV=387  bpm   RR Nwczgqzy=093  ms   VA Wfnsdpgm=711  ms   P Horizontal Axis=5  deg   P Front Axis=68  deg   QRSD Phovsdfc=962  ms   QT Pvltsxre=576  ms   XKkO=834  ms   QRS Axis=31  deg   T Wave Axis=159  deg   - ABNORMAL ECG -   Sinus tachycardia   Probable  left atrial enlargement   Left bundle branch block   ST elevation secondary to IVCD   When compared with ECG of 06-May-2025 18:01:50,   Significant rate increase   Electronically Signed By: Rehan Barrera ([facility]) 2025-06-05 15:13:11   Date and Time of Study:2025-06-05 10:36:59          []  Cardiology/Vascular   []  Pathology  [x]  Old records  []   Other:    Assessment & Plan   Assessment / Plan     Assessment/Plan:    Assessment:  Acute metabolic encephalopathy due to opioids and COPD  Acute hypoxic respiratory failure due to COPD  Acute hypercapnic respiratory failure due to COPD and opioid use  Type 2 diabetes with hyperglycemia  Heart failure without exacerbation  Hodgkin's lymphoma in 2008  Poorly differentiated squamous cell carcinoma of the larynx 2023  Non-small cell carcinoma of the lung untreated  Anaplastic large cell lymphoma    Plan:  Labs and imaging reviewed  Patient okay to use Dilaudid intrathecal pump  Fentanyl patch 12.5 mcg ordered today  Continue oxycodone 5 mg every 4 hours as needed for moderate pain, 10 mg every 4 hours for severe pain  Consulted hematology oncology discussed with Dr. Mancuso, recommendations appreciated  Palliative care consultation  Continue azithromycin  Imodium for diarrhea  Continue Brovana Pulmicort nebs twice daily  Wean oxygen per tolerance  Continue prednisone 40 mg daily  Continue Paxil 20 mg daily  Continue metoprolol XL 25 mg nightly  Continue Lasix 20 mg daily  PT/OT  Xanax 0. 25 mg 3 times a day as needed for anxiety  A.m. labs  Full code  DVT prophylaxis with Lovenox  Clinical course to dictate further management  Discussed with nurse at the bedside  Prognosis poor      VTE Prophylaxis:  Pharmacologic VTE prophylaxis orders are present.        CODE STATUS:   Code Status (Patient has no pulse and is not breathing): CPR (Attempt to Resuscitate)  Medical Interventions (Patient has pulse or is breathing): Full Support  Level Of Support Discussed With: Patient        Electronically signed by Karen Griffith MD, 6/8/2025, 10:38 EDT.    Portions of this documentation were transcribed electronically from a voice recognition software.  I confirm all data accurately represents the service(s) I performed at today's visit.

## 2025-06-09 LAB
GLUCOSE BLDC GLUCOMTR-MCNC: 212 MG/DL (ref 70–99)
GLUCOSE BLDC GLUCOMTR-MCNC: 262 MG/DL (ref 70–99)
QT INTERVAL: 355 MS
QTC INTERVAL: 496 MS

## 2025-06-09 PROCEDURE — 25010000002 MORPHINE PER 10 MG: Performed by: FAMILY MEDICINE

## 2025-06-09 PROCEDURE — 82948 REAGENT STRIP/BLOOD GLUCOSE: CPT

## 2025-06-09 PROCEDURE — 63710000001 INSULIN LISPRO (HUMAN) PER 5 UNITS: Performed by: INTERNAL MEDICINE

## 2025-06-09 PROCEDURE — 25010000002 DIAZEPAM PER 5 MG: Performed by: FAMILY MEDICINE

## 2025-06-09 PROCEDURE — 94664 DEMO&/EVAL PT USE INHALER: CPT

## 2025-06-09 PROCEDURE — 25010000002 ONDANSETRON PER 1 MG: Performed by: FAMILY MEDICINE

## 2025-06-09 PROCEDURE — 82948 REAGENT STRIP/BLOOD GLUCOSE: CPT | Performed by: INTERNAL MEDICINE

## 2025-06-09 PROCEDURE — 63710000001 PREDNISONE PER 1 MG: Performed by: INTERNAL MEDICINE

## 2025-06-09 PROCEDURE — 94799 UNLISTED PULMONARY SVC/PX: CPT

## 2025-06-09 PROCEDURE — 25010000002 ENOXAPARIN PER 10 MG: Performed by: INTERNAL MEDICINE

## 2025-06-09 PROCEDURE — 94761 N-INVAS EAR/PLS OXIMETRY MLT: CPT

## 2025-06-09 PROCEDURE — 99232 SBSQ HOSP IP/OBS MODERATE 35: CPT | Performed by: FAMILY MEDICINE

## 2025-06-09 RX ORDER — LORAZEPAM 2 MG/ML
1 CONCENTRATE ORAL
Status: DISCONTINUED | OUTPATIENT
Start: 2025-06-09 | End: 2025-06-09

## 2025-06-09 RX ORDER — DIAZEPAM 10 MG/2ML
10 INJECTION, SOLUTION INTRAMUSCULAR; INTRAVENOUS
Status: DISCONTINUED | OUTPATIENT
Start: 2025-06-09 | End: 2025-06-09

## 2025-06-09 RX ORDER — DIAZEPAM 10 MG/2ML
2.5 INJECTION, SOLUTION INTRAMUSCULAR; INTRAVENOUS
Status: DISCONTINUED | OUTPATIENT
Start: 2025-06-09 | End: 2025-06-09

## 2025-06-09 RX ORDER — DIAZEPAM 10 MG/2ML
5 INJECTION, SOLUTION INTRAMUSCULAR; INTRAVENOUS
Status: DISCONTINUED | OUTPATIENT
Start: 2025-06-09 | End: 2025-06-09

## 2025-06-09 RX ORDER — FAMOTIDINE 20 MG/1
40 TABLET, FILM COATED ORAL DAILY
Status: DISCONTINUED | OUTPATIENT
Start: 2025-06-09 | End: 2025-06-11 | Stop reason: HOSPADM

## 2025-06-09 RX ORDER — ATROPINE SULFATE 10 MG/ML
2 SOLUTION/ DROPS OPHTHALMIC
Status: DISCONTINUED | OUTPATIENT
Start: 2025-06-09 | End: 2025-06-10

## 2025-06-09 RX ORDER — LORAZEPAM 2 MG/ML
0.5 CONCENTRATE ORAL
Status: DISCONTINUED | OUTPATIENT
Start: 2025-06-09 | End: 2025-06-09

## 2025-06-09 RX ORDER — HALOPERIDOL 0.5 MG/1
0.5 TABLET ORAL
Status: DISCONTINUED | OUTPATIENT
Start: 2025-06-09 | End: 2025-06-09

## 2025-06-09 RX ORDER — METOPROLOL SUCCINATE 25 MG/1
25 TABLET, EXTENDED RELEASE ORAL ONCE
Status: COMPLETED | OUTPATIENT
Start: 2025-06-09 | End: 2025-06-09

## 2025-06-09 RX ORDER — ARFORMOTEROL TARTRATE 15 UG/2ML
15 SOLUTION RESPIRATORY (INHALATION)
Status: DISCONTINUED | OUTPATIENT
Start: 2025-06-09 | End: 2025-06-11 | Stop reason: HOSPADM

## 2025-06-09 RX ORDER — HALOPERIDOL 5 MG/ML
0.5 INJECTION INTRAMUSCULAR
Status: DISCONTINUED | OUTPATIENT
Start: 2025-06-09 | End: 2025-06-09

## 2025-06-09 RX ORDER — HALOPERIDOL 2 MG/ML
0.5 SOLUTION ORAL
Status: DISCONTINUED | OUTPATIENT
Start: 2025-06-09 | End: 2025-06-09

## 2025-06-09 RX ORDER — METOPROLOL SUCCINATE 50 MG/1
50 TABLET, EXTENDED RELEASE ORAL NIGHTLY
Status: DISCONTINUED | OUTPATIENT
Start: 2025-06-09 | End: 2025-06-09

## 2025-06-09 RX ORDER — LORAZEPAM 0.5 MG/1
1 TABLET ORAL
Status: DISCONTINUED | OUTPATIENT
Start: 2025-06-09 | End: 2025-06-09

## 2025-06-09 RX ORDER — FUROSEMIDE 20 MG/1
20 TABLET ORAL DAILY
Status: DISCONTINUED | OUTPATIENT
Start: 2025-06-10 | End: 2025-06-11 | Stop reason: HOSPADM

## 2025-06-09 RX ORDER — LORAZEPAM 0.5 MG/1
0.5 TABLET ORAL
Status: DISCONTINUED | OUTPATIENT
Start: 2025-06-09 | End: 2025-06-09

## 2025-06-09 RX ORDER — LORAZEPAM 2 MG/1
2 TABLET ORAL
Status: DISCONTINUED | OUTPATIENT
Start: 2025-06-09 | End: 2025-06-09

## 2025-06-09 RX ORDER — LORAZEPAM 2 MG/ML
2 CONCENTRATE ORAL
Status: DISCONTINUED | OUTPATIENT
Start: 2025-06-09 | End: 2025-06-09

## 2025-06-09 RX ORDER — IPRATROPIUM BROMIDE AND ALBUTEROL SULFATE 2.5; .5 MG/3ML; MG/3ML
3 SOLUTION RESPIRATORY (INHALATION) EVERY 6 HOURS PRN
Status: DISCONTINUED | OUTPATIENT
Start: 2025-06-09 | End: 2025-06-11 | Stop reason: HOSPADM

## 2025-06-09 RX ORDER — MORPHINE SULFATE 2 MG/ML
2 INJECTION, SOLUTION INTRAMUSCULAR; INTRAVENOUS ONCE
Status: COMPLETED | OUTPATIENT
Start: 2025-06-09 | End: 2025-06-09

## 2025-06-09 RX ORDER — METOPROLOL SUCCINATE 25 MG/1
25 TABLET, EXTENDED RELEASE ORAL EVERY 12 HOURS SCHEDULED
Status: DISCONTINUED | OUTPATIENT
Start: 2025-06-09 | End: 2025-06-11 | Stop reason: HOSPADM

## 2025-06-09 RX ORDER — ACYCLOVIR 200 MG/1
800 CAPSULE ORAL 3 TIMES DAILY
Status: DISCONTINUED | OUTPATIENT
Start: 2025-06-09 | End: 2025-06-11 | Stop reason: HOSPADM

## 2025-06-09 RX ORDER — GABAPENTIN 300 MG/1
600 CAPSULE ORAL EVERY 8 HOURS SCHEDULED
Status: DISCONTINUED | OUTPATIENT
Start: 2025-06-09 | End: 2025-06-11 | Stop reason: HOSPADM

## 2025-06-09 RX ADMIN — MORPHINE SULFATE 4 MG: 4 INJECTION, SOLUTION INTRAMUSCULAR; INTRAVENOUS at 13:44

## 2025-06-09 RX ADMIN — ENOXAPARIN SODIUM 40 MG: 100 INJECTION SUBCUTANEOUS at 09:08

## 2025-06-09 RX ADMIN — MORPHINE SULFATE 8 MG: 4 INJECTION, SOLUTION INTRAMUSCULAR; INTRAVENOUS at 21:20

## 2025-06-09 RX ADMIN — FAMOTIDINE 40 MG: 20 TABLET, FILM COATED ORAL at 18:09

## 2025-06-09 RX ADMIN — GABAPENTIN 600 MG: 300 CAPSULE ORAL at 13:45

## 2025-06-09 RX ADMIN — MORPHINE SULFATE 4 MG: 4 INJECTION, SOLUTION INTRAMUSCULAR; INTRAVENOUS at 19:23

## 2025-06-09 RX ADMIN — OXYCODONE 10 MG: 5 TABLET ORAL at 09:29

## 2025-06-09 RX ADMIN — BUDESONIDE 0.5 MG: 0.5 SUSPENSION RESPIRATORY (INHALATION) at 06:27

## 2025-06-09 RX ADMIN — Medication 10 ML: at 12:20

## 2025-06-09 RX ADMIN — ACYCLOVIR 800 MG: 200 CAPSULE ORAL at 20:31

## 2025-06-09 RX ADMIN — OXYCODONE 5 MG: 5 TABLET ORAL at 00:49

## 2025-06-09 RX ADMIN — ARFORMOTEROL TARTRATE 15 MCG: 15 SOLUTION RESPIRATORY (INHALATION) at 06:27

## 2025-06-09 RX ADMIN — ACETAMINOPHEN 650 MG: 325 TABLET ORAL at 20:32

## 2025-06-09 RX ADMIN — METOPROLOL SUCCINATE ER TABLETS 25 MG: 25 TABLET, FILM COATED, EXTENDED RELEASE ORAL at 05:47

## 2025-06-09 RX ADMIN — IPRATROPIUM BROMIDE AND ALBUTEROL SULFATE 3 ML: .5; 3 SOLUTION RESPIRATORY (INHALATION) at 06:27

## 2025-06-09 RX ADMIN — ONDANSETRON 4 MG: 2 INJECTION INTRAMUSCULAR; INTRAVENOUS at 13:44

## 2025-06-09 RX ADMIN — Medication 10 ML: at 20:33

## 2025-06-09 RX ADMIN — GABAPENTIN 200 MG: 100 CAPSULE ORAL at 05:47

## 2025-06-09 RX ADMIN — ACYCLOVIR 800 MG: 200 CAPSULE ORAL at 12:19

## 2025-06-09 RX ADMIN — PREDNISONE 40 MG: 20 TABLET ORAL at 09:07

## 2025-06-09 RX ADMIN — PAROXETINE HYDROCHLORIDE 20 MG: 20 TABLET, FILM COATED ORAL at 06:07

## 2025-06-09 RX ADMIN — MORPHINE SULFATE 4 MG: 4 INJECTION, SOLUTION INTRAMUSCULAR; INTRAVENOUS at 17:12

## 2025-06-09 RX ADMIN — FUROSEMIDE 20 MG: 20 TABLET ORAL at 09:07

## 2025-06-09 RX ADMIN — MORPHINE SULFATE 2 MG: 2 INJECTION, SOLUTION INTRAMUSCULAR; INTRAVENOUS at 14:47

## 2025-06-09 RX ADMIN — OXYCODONE 5 MG: 5 TABLET ORAL at 04:43

## 2025-06-09 RX ADMIN — HYDROXYZINE HYDROCHLORIDE 25 MG: 25 TABLET, FILM COATED ORAL at 20:32

## 2025-06-09 RX ADMIN — ALPRAZOLAM 0.25 MG: 0.25 TABLET ORAL at 00:49

## 2025-06-09 RX ADMIN — GABAPENTIN 600 MG: 300 CAPSULE ORAL at 21:01

## 2025-06-09 RX ADMIN — NICOTINE 1 PATCH: 21 PATCH, EXTENDED RELEASE TRANSDERMAL at 09:09

## 2025-06-09 RX ADMIN — ACYCLOVIR 800 MG: 200 CAPSULE ORAL at 06:07

## 2025-06-09 RX ADMIN — INSULIN LISPRO 3 UNITS: 100 INJECTION, SOLUTION INTRAVENOUS; SUBCUTANEOUS at 09:06

## 2025-06-09 NOTE — THERAPY EVALUATION
RT EQUIPMENT DEVICE RELATED - SKIN ASSESSMENT    RT Medical Equipment/Device:     Nasal Cannula    Skin Assessment:      Cheek:  Intact  Ears:  Intact  Nares:  Intact    Device Skin Pressure Protection:  Positioning supports utilized    Nurse Notification:  Zayda Juan, RRT

## 2025-06-09 NOTE — PLAN OF CARE
Goal Outcome Evaluation:  Plan of Care Reviewed With: patient           Outcome Evaluation: Patient wearing 2L nasal cannula. Patient has bipap at bedside, patient has not been wearing bipap. Patient is in discomfort, but oxygen sats are tolerating 2L nasal vcannula.

## 2025-06-09 NOTE — PROGRESS NOTES
[facility]     Progress Note    Patient Name: Isha Llanes  : 1965  MRN: [id number]  Primary Care Physician:  Virgil Salas MD  Date of admission: 2025    Subjective   Subjective     Chief Complaint: Possible new compression fracture probably painful as well she is getting a lot of pain medications will also increase the dose of gabapentin because of the herpes zoster    HPI: Patient on Zovirax and also will increase the dose of gabapentin    Review of Systems   All systems were reviewed and negative except for: Has been reviewed    Objective   Objective     Vitals:   Temp:  [97.7 °F (36.5 °C)-98.6 °F (37 °C)] 98.6 °F (37 °C)  Heart Rate:  [101-134] 132  Resp:  [20-24] 24  BP: ()/(67-87) 115/82  Flow (L/min) (Oxygen Therapy):  [2] 2    Physical Exam    Constitutional: Alert and oriented not in acute distress   Eyes: Pupils equal reacting to light and accommodation   HENT: Normocephalic   Neck: No palpable adenopathy   Respiratory: No rales or rhonchi   Cardiovascular: S1-S2 normal no S3 or S4   Gastrointestinal: No palpable organomegaly   Musculoskeletal: No deformities   Neurologic: No localizing signs  Skin: Rash because of herpes zoster on the chest wall       Result Review    Result Review:  I have personally reviewed the results from the time of this admission to 2025 07:47 EDT and agree with these findings:  [x]  Laboratory  []  Microbiology  [x]  Radiology  []  EKG/Telemetry   []  Cardiology/Vascular   []  Pathology  []  Old records  []  Other:  Most notable findings include: Have been reviewed and discussed    Assessment & Plan   Assessment / Plan     Brief Patient Summary:  Isha Llanes is a 59 y.o. female who continue current management with pain management Mace consider start her on morphine sulfate if she does not get relief with the increasing gabapentin    Active Hospital Problems:  Active Hospital Problems    Diagnosis     **Hypercapnia        Plan:    Continue supportive care with pain medication    VTE Prophylaxis:  Pharmacologic VTE prophylaxis orders are present.        CODE STATUS:   Code Status (Patient has no pulse and is not breathing): CPR (Attempt to Resuscitate)  Medical Interventions (Patient has pulse or is breathing): Full Support  Level Of Support Discussed With: Patient    Disposition:  I expect patient to be discharged after the patient's pain is controlled.    Electronically signed by Teodoro Mancuso MD, 06/09/25, 7:47 AM EDT.      Part of this note may be an electronic transcription/translation of spoken language to printed text using the Dragon Dictation System.

## 2025-06-09 NOTE — PROGRESS NOTES
[facility]   Hospitalist Progress Note  Date: 2025  Patient Name: Isha Llanes  : 1965  MRN: [id number]  Date of admission: 2025      Subjective   Subjective   Chief complaint: Shortness of breath     Summary:  59-year-old female with history of chronic pain with history of lymphoma status post chemotherapy, laryngeal cancer status post radiation therapy, squamous cell lung cancer not treated yet, anaplastic large cell lymphoma ALK negative, not a candidate for chemotherapy, in process of transitioning providers, pending outpatient imaging, intrathecal Dilaudid pain pump in situ, aortic valve stenosis status post TAVR, COPD, heart failure preserved ejection fraction, diabetes mellitus, history of DVT, fatty liver disease, anxiety, PTSD, venous insufficiency, hospitalized after shortness of breath and altered mental status with minimal responsiveness, placed on BiPAP, Narcan given, mentation improved, admitted with acute metabolic encephalopathy due to combination of opioids and COPD with acute hypoxemic respiratory failure due to COPD exacerbation with acute hypercapnia respiratory failure due to COPD and opioid use, palliative care consulted to establish goals of care, hematology consulted.  Pain control ongoing issue.  Underlying T6, T7, T12 compression fractures.  Questionable shingles rash on back, acyclovir initiated by hematology.  Prognosis guarded, awaiting patient's final decision on goals of care.     Interval follow-up: Patient seen and examined, wants to talk more about end-of-life care and palliative care, palliative care consulted, patient could not give me a definitive answer, she just wants more pain meds and to remain comfortable.  I told her that comfort measures only is an acceptable process.  She can pass with dignity.  She does not feel like she can achieve therapeutic pain control.  CT chest with contrast showed new compression fractures of T6, T7 and T12.  She has a  right internal mammary adenopathy which is new.  She has cavitation of the bilobed nodule in the right lower lobe.  Overall she has very poor prognosis.  Palliative care reconsulted for evaluation.  Lab holiday today    Review of systems:  All systems reviewed and negative except weakness, fatigue, chronic pain, anxiety    Objective   Objective     Vitals:   Temp:  [98.4 °F (36.9 °C)-98.8 °F (37.1 °C)] 98.4 °F (36.9 °C)  Heart Rate:  [102-134] 102  Resp:  [18-24] 18  BP: ()/(67-87) 99/73  Flow (L/min) (Oxygen Therapy):  [2] 2  Physical Exam               Constitutional: Chronically ill-appearing.              Eyes: Pupils equal, sclerae anicteric, no conjunctival injection              HENT: NCAT, mucous membranes moist              Neck: Supple, full range of motion              Respiratory: Diffuse wheezing              Cardiovascular: RRR, no murmurs, rubs, or gallops, palpable pedal pulses bilaterally              Gastrointestinal: Positive bowel sounds, soft, nontender, nondistended              Musculoskeletal: Bilateral venous changes.  She does have a couple of open lesions.  No streaking erythema up the leg.  No warmth.  Chronic              Psychiatric: Appropriate affect, cooperative, anxious              Neurologic: Oriented x 3, strength symmetric in all extremities, Cranial Nerves grossly intact to confrontation, speech clear              Skin: Rash on back, nonvesicular nonspecific to dermatome    Result Review    Result Review:  I have personally reviewed the pertinent results from the past 24 hours to 6/9/2025 12:55 EDT and agree with these findings:  [x]  Laboratory   CBC          6/6/2025    04:35 6/7/2025    04:58 6/8/2025    05:10   CBC   WBC 9.37  7.94  6.76    RBC 5.06  5.10  5.27    Hemoglobin 12.6  12.7  13.3    Hematocrit 42.5  42.0  43.0    MCV 84.0  82.4  81.6    MCH 24.9  24.9  25.2    MCHC 29.6  30.2  30.9    RDW 14.8  14.9  15.2    Platelets 180  156  152      BMP           6/6/2025    04:35 6/7/2025    04:58 6/8/2025    05:10   BMP   BUN 11.9  7.3  6.8    Creatinine 0.32  0.30  0.37    Sodium 137  131  131    Potassium 4.1  3.7  4.0    Chloride 98  95  94    CO2 28.5  23.2  24.3    Calcium 8.4  8.2  8.3      LIVER FUNCTION TESTS:      Lab 06/08/25  0510 06/07/25  0458 06/06/25  0435 06/05/25  1106   TOTAL PROTEIN 5.7* 5.7* 5.9* 6.0   ALBUMIN 3.1* 3.0* 3.0* 3.2*   GLOBULIN  --   --  2.9 2.8   ALT (SGPT) 10 10 12 9   AST (SGOT) 12 13 13 10   BILIRUBIN 0.6 0.5 0.3 0.3   INDIRECT BILIRUBIN 0.4 0.3  --   --    BILIRUBIN DIRECT 0.2 0.2  --   --    ALK PHOS 107 119* 129* 129*   LIPASE  --   --   --  66*       [x]  Microbiology   Microbiology Results (last 10 days)       Procedure Component Value - Date/Time    Clostridioides difficile Toxin - Stool, Per Rectum [[id number]] Collected: 06/06/25 1205    Lab Status: Final result Specimen: Stool from Per Rectum Updated: 06/06/25 1250    Narrative:      The following orders were created for panel order Clostridioides difficile Toxin - Stool, Per Rectum.  Procedure                               Abnormality         Status                     ---------                               -----------         ------                     Clostridioides difficile...[[id number]]                      Final result                 Please view results for these tests on the individual orders.    Clostridioides difficile Toxin, PCR - Stool, Per Rectum [[id number]] Collected: 06/06/25 1205    Lab Status: Final result Specimen: Stool from Per Rectum Updated: 06/06/25 1250     Toxigenic C. difficile by PCR Negative     027 Toxin Presumptive Negative    Narrative:      The result indicates the absence of toxigenic C. difficile from stool specimen.     Blood Culture - Blood, Arm, Right [[id number]]  (Normal) Collected: 06/05/25 1402    Lab Status: Preliminary result Specimen: Blood from Arm, Right Updated: 06/08/25 1415     Blood Culture No growth at 3 days    Blood Culture -  Blood, Arm, Left [[id number]]  (Normal) Collected: 06/05/25 1402    Lab Status: Preliminary result Specimen: Blood from Arm, Left Updated: 06/08/25 1415     Blood Culture No growth at 3 days    S. Pneumo Ag Urine or CSF - Urine, Straight Cath [[id number]]  (Normal) Collected: 06/05/25 1343    Lab Status: Final result Specimen: Urine from Straight Cath Updated: 06/05/25 1558     Strep Pneumo Ag Negative    Legionella Antigen, Urine - Urine, Straight Cath [[id number]]  (Normal) Collected: 06/05/25 1343    Lab Status: Edited Result - FINAL Specimen: Urine from Straight Cath Updated: 06/05/25 1626     LEGIONELLA ANTIGEN, URINE Negative    Mycoplasma Pneumoniae Antibody, IgM - Blood, [[id number]]  (Normal) Collected: 06/05/25 1106    Lab Status: Final result Specimen: Blood Updated: 06/05/25 1619     Mycoplasma pneumo IgM Negative              [x]  Radiology CT Soft Tissue Neck With Contrast  Result Date: 6/9/2025  Impression: 1. Stable exam with soft tissue thickening involving the left side of the oropharynx and hypopharynx to the level of the area epiglottic folds, unchanged in appearance from prior study. 2. Stable borderline size 9 x 7 mm left level 1B lymph node. 3. Emphysema Electronically Signed: Poncho Macias MD  6/9/2025 9:40 AM EDT  Workstation ID: [id number]    CT Chest With Contrast Diagnostic  Result Date: 6/8/2025  1.There is new right internal mammary adenopathy. There is also new left retropectoral adenopathy. Comparison with the patient's more recent prior outside study would be beneficial. 2.New compression fractures at T6, T7 and T12 as described. No retropulsion. 3.Interval cavitation of the bilobed nodule in the right lower lobe since the prior study. There are some small adjacent satellite nodules. 4.Pulmonary emphysema. 5.Probable splenomegaly. 6.Additional findings as noted above. Electronically Signed: Des Carlson MD  6/8/2025 8:50 PM EDT  Workstation ID: [id number]    XR Chest 1 View  Result Date:  6/7/2025  Emphysema. No active disease. Electronically Signed: Des Carlson MD  6/7/2025 9:24 PM EDT  Workstation ID: [id number]    XR Chest 1 View  Result Date: 6/5/2025  Impression: Stable chest examination without acute cardiopulmonary process. Electronically Signed: Mikayla Linares MD  6/5/2025 11:06 AM EDT  Workstation ID: [id number]        [x]  EKG/Telemetry   ECG 12 Lead Chest Pain   Preliminary Result   HEART DIHA=419  bpm   RR Yxvbkkvc=060  ms   WV Lomxzyby=246  ms   P Horizontal Axis=-8  deg   P Front Axis=81  deg   QRSD Pgcjuzpt=870  ms   QT Dwerifdo=643  ms   JOoT=135  ms   QRS Axis=44  deg   T Wave Axis=145  deg   - ABNORMAL ECG -   Sinus tachycardia   Atrial premature complexes   Probable left atrial enlargement   Left bundle branch block   ST elevation secondary to IVCD   Date and Time of Study:2025-06-08 20:00:42      ECG 12 Lead Chest Pain   Preliminary Result   HEART RATE=99  bpm   RR Ihdekxkk=653  ms   WV Ljkkbvwi=538  ms   P Horizontal Axis=-10  deg   P Front Axis=72  deg   QRSD Ojnsltjg=035  ms   QT Ymtnlxru=208  ms   MBlX=468  ms   QRS Axis=36  deg   T Wave Axis=146  deg   - ABNORMAL ECG -   Sinus rhythm   Left atrial enlargement   Left bundle branch block   ST elevation secondary to IVCD   Date and Time of Study:2025-06-07 21:11:31      ECG 12 Lead ED Triage Standing Order; Chest Pain   Final Result   HEART RUBY=605  bpm   RR Kltobqlh=707  ms   WV Wzqqztnx=343  ms   P Horizontal Axis=5  deg   P Front Axis=68  deg   QRSD Vzxsivax=668  ms   QT Bcdezwfx=852  ms   DQhG=304  ms   QRS Axis=31  deg   T Wave Axis=159  deg   - ABNORMAL ECG -   Sinus tachycardia   Probable  left atrial enlargement   Left bundle branch block   ST elevation secondary to IVCD   When compared with ECG of 06-May-2025 18:01:50,   Significant rate increase   Electronically Signed By: Rehan Barrera ([facility]) 2025-06-05 15:13:11   Date and Time of Study:2025-06-05 10:36:59          []  Cardiology/Vascular   []  Pathology  [x]  Old  records  []  Other:    Assessment & Plan   Assessment / Plan     Assessment/Plan:    Assessment:  Acute metabolic encephalopathy due to opioids and COPD  Acute hypoxic respiratory failure due to COPD  Acute hypercapnic respiratory failure due to COPD and opioid use  Type 2 diabetes with hyperglycemia  Heart failure without exacerbation  Hodgkin's lymphoma in 2008  Poorly differentiated squamous cell carcinoma of the larynx 2023  Non-small cell carcinoma of the lung untreated  Anaplastic large cell lymphoma    Plan:  Labs and imaging reviewed  Patient okay to use Dilaudid intrathecal pump  Fentanyl patch 12.5 mcg ordered continue  Palliative care reconsulted to establish goals of care, patient considering comfort measures only  Continue oxycodone 5 mg every 4 hours as needed for moderate pain, 10 mg every 4 hours for severe pain  Consulted hematology oncology discussed with Dr. Mancuso, recommendations appreciated; acyclovir per hematology  Continue azithromycin  Imodium for diarrhea  Continue Brovana Pulmicort nebs twice daily  Wean oxygen per tolerance  Continue prednisone 40 mg daily  Continue Paxil 20 mg daily  Continue metoprolol XL 25 mg nightly  Continue Lasix 20 mg daily  PT/OT  Xanax 0. 25 mg 3 times a day as needed for anxiety  A.m. labs  Full code  DVT prophylaxis with Lovenox  Clinical course to dictate further management  Discussed with nurse at the bedside  Prognosis poor    VTE Prophylaxis:  Pharmacologic VTE prophylaxis orders are present.        CODE STATUS:   Code Status (Patient has no pulse and is not breathing): CPR (Attempt to Resuscitate)  Medical Interventions (Patient has pulse or is breathing): Full Support  Level Of Support Discussed With: Patient        Electronically signed by Karen Griffith MD, 6/9/2025, 12:55 EDT.    Portions of this documentation were transcribed electronically from a voice recognition software.  I confirm all data accurately represents the service(s) I performed  at today's visit.

## 2025-06-09 NOTE — PLAN OF CARE
Goal Outcome Evaluation:  Plan of Care Reviewed With: patient, child        Progress: no change  Outcome Evaluation: Patient alert and oriented x4 on room air. Patient transferred to 4NT from 4mtu this shift for comfort measures. Patients daughter expressed discomfort that she was comfort measures and requested that Dr sadie cueto speak to patient. After speaking with both the patient and family, and educating them more on what comfort measures entailed, it was decided that patient would no longer be comfort measures. Patient has been agitated and refused her skin assessment. Patient medicated for pain with PRN pain medication. No new issues at this time.

## 2025-06-09 NOTE — CONSULTS
06/09/25 1540   Spiritual Care   Spiritual Care Visit Type other (see comments)  (Comfort Measures Only)   Spiritual Care Source palliative care   Receptivity to Spiritual Care visit welcomed   Spiritual Care Request other (see comments)  (pt comfort measures only at this time)   Spiritual Care Interventions supportive conversation provided  (pt expressed pain, RN notified)   Response to Spiritual Care thanks expressed   Use of Spiritual Resources non-Restorationist use of spiritual care   Spiritual Care Follow-Up follow-up planned regularly for general support     No other needs expressed by pt at this time

## 2025-06-09 NOTE — PLAN OF CARE
Goal Outcome Evaluation:              Outcome Evaluation: Female patient presented as frequently demanding and minimally cooperative throughout the shift. Patient repeatedly requested narcotic pain medication and demonstrated limited engagement with care activities unless pain medication and preferred comfort items (e.g., popsicles) were provided. Redirection attempted multiple times with limited success. Pain level assessed regularly; administered medications as ordered and within safe parameters.  Patient exhibited periods of noncompliance with care recommendations, including refusal of vital signs and resistance to repositioning and hygiene care at times. Education provided regarding medication schedule and care plan. Will continue to monitor behavior, reassess pain management needs, and encourage participation in care.

## 2025-06-09 NOTE — CONSULTS
Nutrition Services    Patient Name: Isha Llanes  YOB: 1965  MRN: [id number]  Admission date: 6/5/2025      CLINICAL NUTRITION ASSESSMENT      Reason for Assessment  F/U to Tube Feeding Assessment     H&P:  Past Medical History:   Diagnosis Date    Anesthesia     REPORTS HAS GOT REAL EMOTIONAL AND HAS BECOME ANGRY BEFORE    Anxiety     Aortic stenosis, severe     HAS BIO VALVE REPLACED    Arthritis     Asthma     Back pain     Blood clotting disorder     test to find out what type    Cancer     CHF (congestive heart failure)     Chronic low back pain with sciatica     Chronic pain disorder     COPD (chronic obstructive pulmonary disease)     Coronary artery disease     looking to follow with Cardio    Diabetes mellitus     TYPE 2    Dyspnea on effort     Fatty liver     GERD (gastroesophageal reflux disease)     H/O lumpectomy     H/O: hysterectomy     Hiatal hernia     History of degenerative disc disease     History of kidney stones     History of pulmonary embolus (PE)     History of transfusion     AS A CHILD NO TRANSFUSION REACTION    Hodgkin's lymphoma     5/19/23  5 YEARS SINCE LAST CHEMO TX    Hypertension     Immobility     Lupus     Nausea vomiting and diarrhea 03/07/2024    Panic disorder     Pelvic pain     Peripheral neuropathy     Personal history of DVT (deep vein thrombosis)     over 20 years    PONV (postoperative nausea and vomiting)     Presence of intrathecal pump     PTSD (post-traumatic stress disorder)     Sacroiliac joint disease     SI (sacroiliac) joint inflammation     Venous insufficiency         Current Problems:   Active Hospital Problems    Diagnosis     **Hypercapnia         Nutrition/Diet History         Narrative   06/09 F/U  Patient is Aa0x3 upon RD visit, NAD.   Pt's intake of morning meal tray, <25%. Last time patient has received bolus feed was 6/06. RD spoke with RN the proper regimen for the bolus feeds through patient's PEG tube.   RN attests to  "possibility that patient may be moved to comfort care.       06/06  Isha Llanes is a 59 y.o. female past medical history of aortic valve stenosis post-fire valve, COPD not on home oxygen, heart failure preserved ejection pressure, type 2 diabetes, Hodgkin's lymphoma, head neck cancer, anaplastic lymphoma on active treatment, and history of chronic pain on intrathecal pain pump who presents with respiratory distress.     Patient is Aa0x3 upon RD visit NAD.  Patient's daughter acts as primary caregiver. RN reports poor oral intake, <25%.  Patient has PEG in place, dry, needs flushing and cleaning.    Daughter reports the patient is used to receiving bolus feeds through the PEG. RD to order Boost VHC 4 cartons per day. RN made aware.     Daughter confirms pt has experiencing slight nausea and diarrhea. Last BM 6/03    Pt reports her UBW to be 140 pounds. Daughter reports she now weighs around 111-116 pounds.    Daughter notes a 30 pound weight loss in a near 6 month time period; significant.      Anthropometrics        Current Height, Weight Height: 167.6 cm (65.98\")  Weight: 52.3 kg (115 lb 4.8 oz)   Current BMI Body mass index is 18.62 kg/m².   BMI Classification Normal range   % IBW    Adjusted Body Weight (ABW)    Weight Hx  Wt Readings from Last 30 Encounters:   06/09/25 0449 52.3 kg (115 lb 4.8 oz)   06/06/25 0542 58.6 kg (129 lb 3 oz)   06/05/25 1744 53.5 kg (117 lb 15.1 oz)   06/05/25 1030 61.8 kg (136 lb 3.9 oz)   05/27/25 1742 56.6 kg (124 lb 12.5 oz)   05/15/25 1332 63.7 kg (140 lb 6.9 oz)   05/04/25 0815 60.8 kg (134 lb 0.6 oz)   04/25/25 1132 59 kg (130 lb)   04/22/25 0634 58.9 kg (129 lb 13.6 oz)   04/10/25 1411 54.9 kg (121 lb)   04/17/25 1523 59 kg (130 lb)   04/09/25 1109 54.9 kg (121 lb)   03/28/25 2000 66.1 kg (145 lb 11.6 oz)   03/28/25 0920 62.4 kg (137 lb 9.6 oz)   03/26/25 1047 62.6 kg (138 lb)   03/18/25 0347 62.7 kg (138 lb 3.7 oz)   03/18/25 0023 63.8 kg (140 lb 10.5 oz)   02/08/25 " 0602 63.8 kg (140 lb 10.5 oz)   12/23/24 0750 61.3 kg (135 lb 0.5 oz)   12/11/24 0642 58.1 kg (128 lb 1.4 oz)   12/10/24 0541 61 kg (134 lb 7.7 oz)   12/09/24 0531 59.6 kg (131 lb 6.3 oz)   12/07/24 0745 64 kg (141 lb 1.5 oz)   11/07/24 0406 57.7 kg (127 lb 3.3 oz)   11/06/24 2148 59.9 kg (132 lb 0.9 oz)   09/13/24 0943 57.6 kg (127 lb)   08/21/24 1605 65.7 kg (144 lb 13.5 oz)   08/21/24 1421 60.3 kg (133 lb)   08/07/24 0928 60.3 kg (133 lb)   07/20/24 0752 60.5 kg (133 lb 6.1 oz)   07/02/24 1340 60.5 kg (133 lb 6.1 oz)   06/27/24 0950 46.7 kg (103 lb)   06/13/24 0417 58.6 kg (129 lb 3 oz)   06/12/24 2236 59.3 kg (130 lb 11.7 oz)   06/06/24 1049 48 kg (105 lb 14.4 oz)   05/29/24 2259 62.2 kg (137 lb 2 oz)   05/23/24 1105 47.6 kg (105 lb)   05/09/24 1042 52.2 kg (115 lb)   05/06/24 1038 58 kg (127 lb 14.4 oz)   04/27/24 1041 64.9 kg (143 lb 1.3 oz)   04/10/24 1200 45.8 kg (101 lb)          Wt Change Observation Pt reports her UBW to be 140 pounds. Daughter reports she now weighs around 111-116 pounds.    Daughter notes a 30 pound weight loss in a near 6 month time period; significant.     This thirty pound weight loss is not noted in the EMR. Pt weight seemingly fluctuates between 130-140 pounds.      Estimated/Assessed Needs  Estimated Needs based on: Current Body Weight       Energy Requirements 30-35 kcal/kg    EST Needs (kcal/day) 8931-0617 kcals/d       Protein Requirements 1.0-1.2 g/kg   EST Daily Needs (g/day) 59-70 g/d       Fluid Requirements 1 ml/kcal    Estimated Needs (mL/day) 1758-2051      Labs/Medications         Pertinent Labs Reviewed.   Results from last 7 days   Lab Units 06/08/25  0510 06/07/25  0458 06/06/25  0435   SODIUM mmol/L 131* 131* 137   POTASSIUM mmol/L 4.0 3.7 4.1   CHLORIDE mmol/L 94* 95* 98   CO2 mmol/L 24.3 23.2 28.5   BUN mg/dL 6.8 7.3 11.9   CREATININE mg/dL 0.37* 0.30* 0.32*   CALCIUM mg/dL 8.3* 8.2* 8.4*   BILIRUBIN mg/dL 0.6 0.5 0.3   ALK PHOS U/L 107 119* 129*   ALT (SGPT) U/L  10 10 12   AST (SGOT) U/L 12 13 13   GLUCOSE mg/dL 116* 178* 145*     Results from last 7 days   Lab Units 06/08/25  0510 06/07/25  0458 06/06/25  0435 06/06/25  0435   MAGNESIUM mg/dL 1.7 1.5*  --  1.7   PHOSPHORUS mg/dL 2.4* 1.3*   < >  --    HEMOGLOBIN g/dL 13.3 12.7  --  12.6   HEMATOCRIT % 43.0 42.0  --  42.5    < > = values in this interval not displayed.     COVID19   Date Value Ref Range Status   05/04/2025 Not Detected Not Detected - Ref. Range Final     Lab Results   Component Value Date    HGBA1C 5.90 (H) 11/29/2023         Pertinent Medications Reviewed.     Malnutrition Severity Assessment              Nutrition Diagnosis         Nutrition Dx Problem 1 Inadequate energy Intake related to inadequate enteral infusion rate as evidenced by per nursing documentation.     Nutrition Intervention           Current Nutrition Orders & Evaluation of Intake       Current PO Diet Diet: Cardiac, Diabetic; Healthy Heart (2-3 Na+); Consistent Carbohydrate; Fluid Consistency: Thin (IDDSI 0)   Supplement Orders Placed This Encounter      Tube Feeding: Formula: Boost VHC; Feeding Type: Bolus; By: Pump; Type: Set Rate; Volume: 240 mL; Feedings Per Day: 4; Bolus Schedule: 06, 11, 17, 22; Water Flush: Other; Other: 175mL; Every: Before & After TF Bolus; Water Bolus: None           Nutrition Intervention/Prescription        RD recommends Bolus feeds into PEG tube, DAILY, not PRN.    4 cartons Boost VHC through PEG. Syringe or Gravity fed. Every 4 hours.    Water Flush before and after feedings; 175mL q4h or per MD order    At goal   2120 kcals, 88g  Protein, 636 mL free H20      Elevate HOB 30-45 degrees          Medical Nutrition Therapy/Nutrition Education          Learner     Readiness N/A  N/A     Method     Response N/A  N/A     Monitor/Evaluation        Monitor PO intake, EN delivery/tolerance     Nutrition Discharge Plan         To be determined     Electronically signed by:  Renny Seals RD  06/09/25 13:51 EDT

## 2025-06-09 NOTE — CONSULTS
Palliative Care follow up with patient, daughter, primary RN and PA at bedside.  After long discussion, concerning pain control vs comfort care, patient has decided to go comfort care with [facility] consult placed.  Possible GIP but unsure at this time.  Emotional support provided.  Palliative Care will continue to follow.    Magdalene ANGULO RN, [facility]  Palliative Care

## 2025-06-10 LAB
ALBUMIN SERPL-MCNC: 2.9 G/DL (ref 3.5–5.2)
ALP SERPL-CCNC: 106 U/L (ref 39–117)
ALT SERPL W P-5'-P-CCNC: 13 U/L (ref 1–33)
ANION GAP SERPL CALCULATED.3IONS-SCNC: 10 MMOL/L (ref 5–15)
AST SERPL-CCNC: 12 U/L (ref 1–32)
BACTERIA SPEC AEROBE CULT: NORMAL
BACTERIA SPEC AEROBE CULT: NORMAL
BASOPHILS # BLD AUTO: 0.02 10*3/MM3 (ref 0–0.2)
BASOPHILS NFR BLD AUTO: 0.3 % (ref 0–1.5)
BILIRUB CONJ SERPL-MCNC: 0.2 MG/DL (ref 0–0.3)
BILIRUB INDIRECT SERPL-MCNC: 0.4 MG/DL
BILIRUB SERPL-MCNC: 0.6 MG/DL (ref 0–1.2)
BUN SERPL-MCNC: 11.5 MG/DL (ref 6–20)
BUN/CREAT SERPL: 26.7 (ref 7–25)
CALCIUM SPEC-SCNC: 8.5 MG/DL (ref 8.6–10.5)
CHLORIDE SERPL-SCNC: 94 MMOL/L (ref 98–107)
CO2 SERPL-SCNC: 27 MMOL/L (ref 22–29)
CREAT SERPL-MCNC: 0.43 MG/DL (ref 0.57–1)
DEPRECATED RDW RBC AUTO: 43.3 FL (ref 37–54)
EGFRCR SERPLBLD CKD-EPI 2021: 112.2 ML/MIN/1.73
EOSINOPHIL # BLD AUTO: 0.02 10*3/MM3 (ref 0–0.4)
EOSINOPHIL NFR BLD AUTO: 0.3 % (ref 0.3–6.2)
ERYTHROCYTE [DISTWIDTH] IN BLOOD BY AUTOMATED COUNT: 14.7 % (ref 12.3–15.4)
GLUCOSE SERPL-MCNC: 239 MG/DL (ref 65–99)
HCT VFR BLD AUTO: 42.2 % (ref 34–46.6)
HGB BLD-MCNC: 12.9 G/DL (ref 12–15.9)
IMM GRANULOCYTES # BLD AUTO: 0.03 10*3/MM3 (ref 0–0.05)
IMM GRANULOCYTES NFR BLD AUTO: 0.5 % (ref 0–0.5)
LYMPHOCYTES # BLD AUTO: 0.42 10*3/MM3 (ref 0.7–3.1)
LYMPHOCYTES NFR BLD AUTO: 6.5 % (ref 19.6–45.3)
MAGNESIUM SERPL-MCNC: 1.7 MG/DL (ref 1.6–2.6)
MCH RBC QN AUTO: 24.9 PG (ref 26.6–33)
MCHC RBC AUTO-ENTMCNC: 30.6 G/DL (ref 31.5–35.7)
MCV RBC AUTO: 81.3 FL (ref 79–97)
MONOCYTES # BLD AUTO: 0.51 10*3/MM3 (ref 0.1–0.9)
MONOCYTES NFR BLD AUTO: 7.9 % (ref 5–12)
NEUTROPHILS NFR BLD AUTO: 5.42 10*3/MM3 (ref 1.7–7)
NEUTROPHILS NFR BLD AUTO: 84.5 % (ref 42.7–76)
NRBC BLD AUTO-RTO: 0 /100 WBC (ref 0–0.2)
PHOSPHATE SERPL-MCNC: 2.7 MG/DL (ref 2.5–4.5)
PLATELET # BLD AUTO: 173 10*3/MM3 (ref 140–450)
PMV BLD AUTO: 10.2 FL (ref 6–12)
POTASSIUM SERPL-SCNC: 4.1 MMOL/L (ref 3.5–5.2)
PROT SERPL-MCNC: 6.1 G/DL (ref 6–8.5)
RBC # BLD AUTO: 5.19 10*6/MM3 (ref 3.77–5.28)
SODIUM SERPL-SCNC: 131 MMOL/L (ref 136–145)
WBC NRBC COR # BLD AUTO: 6.42 10*3/MM3 (ref 3.4–10.8)

## 2025-06-10 PROCEDURE — 85025 COMPLETE CBC W/AUTO DIFF WBC: CPT | Performed by: FAMILY MEDICINE

## 2025-06-10 PROCEDURE — 99221 1ST HOSP IP/OBS SF/LOW 40: CPT | Performed by: NEUROLOGICAL SURGERY

## 2025-06-10 PROCEDURE — 80048 BASIC METABOLIC PNL TOTAL CA: CPT | Performed by: FAMILY MEDICINE

## 2025-06-10 PROCEDURE — 99232 SBSQ HOSP IP/OBS MODERATE 35: CPT | Performed by: FAMILY MEDICINE

## 2025-06-10 PROCEDURE — 94761 N-INVAS EAR/PLS OXIMETRY MLT: CPT

## 2025-06-10 PROCEDURE — 84100 ASSAY OF PHOSPHORUS: CPT | Performed by: FAMILY MEDICINE

## 2025-06-10 PROCEDURE — 94799 UNLISTED PULMONARY SVC/PX: CPT

## 2025-06-10 PROCEDURE — 25010000002 MORPHINE PER 10 MG: Performed by: FAMILY MEDICINE

## 2025-06-10 PROCEDURE — 94664 DEMO&/EVAL PT USE INHALER: CPT

## 2025-06-10 PROCEDURE — 25010000002 ONDANSETRON PER 1 MG: Performed by: FAMILY MEDICINE

## 2025-06-10 PROCEDURE — 83735 ASSAY OF MAGNESIUM: CPT | Performed by: FAMILY MEDICINE

## 2025-06-10 PROCEDURE — 80076 HEPATIC FUNCTION PANEL: CPT | Performed by: FAMILY MEDICINE

## 2025-06-10 PROCEDURE — 94760 N-INVAS EAR/PLS OXIMETRY 1: CPT

## 2025-06-10 RX ORDER — CYCLOBENZAPRINE HCL 5 MG
5 TABLET ORAL EVERY 8 HOURS SCHEDULED
Status: DISCONTINUED | OUTPATIENT
Start: 2025-06-10 | End: 2025-06-11 | Stop reason: HOSPADM

## 2025-06-10 RX ORDER — PAROXETINE 20 MG/1
20 TABLET, FILM COATED ORAL DAILY
Status: DISCONTINUED | OUTPATIENT
Start: 2025-06-10 | End: 2025-06-11 | Stop reason: HOSPADM

## 2025-06-10 RX ORDER — MORPHINE SULFATE 15 MG/1
15 TABLET, FILM COATED, EXTENDED RELEASE ORAL EVERY 12 HOURS SCHEDULED
Refills: 0 | Status: DISCONTINUED | OUTPATIENT
Start: 2025-06-10 | End: 2025-06-11 | Stop reason: HOSPADM

## 2025-06-10 RX ADMIN — GABAPENTIN 600 MG: 300 CAPSULE ORAL at 13:47

## 2025-06-10 RX ADMIN — ONDANSETRON 4 MG: 2 INJECTION INTRAMUSCULAR; INTRAVENOUS at 20:09

## 2025-06-10 RX ADMIN — FAMOTIDINE 40 MG: 20 TABLET, FILM COATED ORAL at 09:57

## 2025-06-10 RX ADMIN — MORPHINE SULFATE 15 MG: 15 TABLET, FILM COATED, EXTENDED RELEASE ORAL at 20:08

## 2025-06-10 RX ADMIN — NICOTINE 1 PATCH: 21 PATCH, EXTENDED RELEASE TRANSDERMAL at 09:58

## 2025-06-10 RX ADMIN — ACETAMINOPHEN 650 MG: 325 TABLET ORAL at 14:15

## 2025-06-10 RX ADMIN — GABAPENTIN 600 MG: 300 CAPSULE ORAL at 05:17

## 2025-06-10 RX ADMIN — ACYCLOVIR 800 MG: 200 CAPSULE ORAL at 16:03

## 2025-06-10 RX ADMIN — MORPHINE SULFATE 8 MG: 4 INJECTION, SOLUTION INTRAMUSCULAR; INTRAVENOUS at 02:32

## 2025-06-10 RX ADMIN — FUROSEMIDE 20 MG: 20 TABLET ORAL at 09:57

## 2025-06-10 RX ADMIN — MORPHINE SULFATE 8 MG: 4 INJECTION, SOLUTION INTRAMUSCULAR; INTRAVENOUS at 07:33

## 2025-06-10 RX ADMIN — ACETAMINOPHEN 650 MG: 325 TABLET ORAL at 21:33

## 2025-06-10 RX ADMIN — MORPHINE SULFATE 4 MG: 4 INJECTION, SOLUTION INTRAMUSCULAR; INTRAVENOUS at 00:05

## 2025-06-10 RX ADMIN — HYDROXYZINE HYDROCHLORIDE 25 MG: 25 TABLET, FILM COATED ORAL at 05:17

## 2025-06-10 RX ADMIN — GABAPENTIN 600 MG: 300 CAPSULE ORAL at 20:08

## 2025-06-10 RX ADMIN — Medication 10 ML: at 20:09

## 2025-06-10 RX ADMIN — ARFORMOTEROL TARTRATE 15 MCG: 15 SOLUTION RESPIRATORY (INHALATION) at 07:37

## 2025-06-10 RX ADMIN — MORPHINE SULFATE 8 MG: 4 INJECTION, SOLUTION INTRAMUSCULAR; INTRAVENOUS at 04:36

## 2025-06-10 RX ADMIN — MORPHINE SULFATE 15 MG: 15 TABLET, FILM COATED, EXTENDED RELEASE ORAL at 09:57

## 2025-06-10 RX ADMIN — ACYCLOVIR 800 MG: 200 CAPSULE ORAL at 20:09

## 2025-06-10 RX ADMIN — PAROXETINE HYDROCHLORIDE 20 MG: 20 TABLET, FILM COATED ORAL at 16:02

## 2025-06-10 RX ADMIN — ACETAMINOPHEN 650 MG: 325 TABLET ORAL at 05:18

## 2025-06-10 RX ADMIN — Medication 10 ML: at 10:00

## 2025-06-10 RX ADMIN — ACYCLOVIR 800 MG: 200 CAPSULE ORAL at 09:58

## 2025-06-10 NOTE — PLAN OF CARE
Goal Outcome Evaluation:           Progress: no change  Outcome Evaluation: Pt is AxOx4 on 2l of nasal cannula. Pt has requested pain medication throughout shift, doctor contacted but no increase in pain medication. Wound care was done by wound nurse. Feeding tube assessed and dressing changed. Pt has been in and out of sleep today. Pt has no needs or concerns currently.

## 2025-06-10 NOTE — PLAN OF CARE
Goal Outcome Evaluation:  Plan of Care Reviewed With: patient        Progress: no change  Outcome Evaluation: A & O x 4, C/O pain throughout shift, pain medication given as ordered PRN, pulse ox in place to monitor oxygen saturations. Wound care complete, feeding tube in place and intact with split gauze in place to site.

## 2025-06-10 NOTE — SIGNIFICANT NOTE
Wound Eval / Progress Noted    JEMMA Go     Patient Name: Isha Llanes  : 1965  MRN: [id number]  Today's Date: 6/10/2025                 Admit Date: 2025    Visit Dx:    ICD-10-CM ICD-9-CM   1. COPD exacerbation  J44.1 491.21   2. Acute on chronic respiratory failure with hypoxia and hypercapnia  J96.21 518.84    J96.22 786.09     799.02         Hypercapnia        Past Medical History:   Diagnosis Date    Anesthesia     REPORTS HAS GOT REAL EMOTIONAL AND HAS BECOME ANGRY BEFORE    Anxiety     Aortic stenosis, severe     HAS BIO VALVE REPLACED    Arthritis     Asthma     Back pain     Blood clotting disorder     test to find out what type    Cancer     CHF (congestive heart failure)     Chronic low back pain with sciatica     Chronic pain disorder     COPD (chronic obstructive pulmonary disease)     Coronary artery disease     looking to follow with Cardio    Diabetes mellitus     TYPE 2    Dyspnea on effort     Fatty liver     GERD (gastroesophageal reflux disease)     H/O lumpectomy     H/O: hysterectomy     Hiatal hernia     History of degenerative disc disease     History of kidney stones     History of pulmonary embolus (PE)     History of transfusion     AS A CHILD NO TRANSFUSION REACTION    Hodgkin's lymphoma     23  5 YEARS SINCE LAST CHEMO TX    Hypertension     Immobility     Lupus     Nausea vomiting and diarrhea 2024    Panic disorder     Pelvic pain     Peripheral neuropathy     Personal history of DVT (deep vein thrombosis)     over 20 years    PONV (postoperative nausea and vomiting)     Presence of intrathecal pump     PTSD (post-traumatic stress disorder)     Sacroiliac joint disease     SI (sacroiliac) joint inflammation     Venous insufficiency         Past Surgical History:   Procedure Laterality Date    ABDOMINAL SURGERY      BACK SURGERY      SPINAL FUSION     BACK SURGERY      BLADDER REPAIR      BRONCHOSCOPY WITH ION ROBOTIC ASSIST N/A 2025    Procedure:  BRONCHOSCOPY WITH ION ROBOT: REBUS, EBUS, NEEDLE ASPIRATES, BIOPSIES, BRUSHINGS, BAL, WASHINGS: insertion of lighted robot navigated instrument to view inside the lung;  Surgeon: Enzo Segal MD;  Location: [facility] MAIN OR;  Service: Robotics - Pulmonary;  Laterality: N/A;    CARDIAC CATHETERIZATION N/A 03/06/2019    Procedure: Valvuloplasty;  Surgeon: Wayne Johnson MD;  Location: [facility] INVASIVE LOCATION;  Service: Cardiology    CARDIAC CATHETERIZATION      CARDIAC CATHETERIZATION Left 05/31/2023    Procedure: Cardiac Catheterization/Vascular Study;  Surgeon: Poncho Reid MD;  Location: [facility] CATH INVASIVE LOCATION;  Service: Cardiovascular;  Laterality: Left;    CARDIAC SURGERY      Mechanical valve    CARDIAC VALVE REPLACEMENT  2021    CHOLECYSTECTOMY      COLONOSCOPY N/A 12/29/2021    Procedure: COLONOSCOPY;  Surgeon: Karine Orlando MD;  Location: [facility] ENDOSCOPY;  Service: Gastroenterology;  Laterality: N/A;  POOR PREP    COLONOSCOPY N/A 04/13/2022    Procedure: COLONOSCOPY WITH POLYPECTOMY;  Surgeon: Karine Orlando MD;  Location: [facility] ENDOSCOPY;  Service: Gastroenterology;  Laterality: N/A;  COLON POLYP, HEMORRHOIDS, POOR PREP    COLONOSCOPY      DIRECT LARYNGOSCOPY, ESOPHAGOSCOPY, BRONCHOSCOPY N/A 05/22/2023    Procedure: DIRECT LARYNGOSCOPY WITH BIOPSIES , ESOPHAGOSCOPY, BRONCHOSCOPY;  Surgeon: Ronnie Mcgarry MD;  Location: [facility] OR OSC;  Service: ENT;  Laterality: N/A;    ENDOSCOPY N/A 12/29/2021    Procedure: ESOPHAGOGASTRODUODENOSCOPY;  Surgeon: Karine Orlando MD;  Location: [facility] ENDOSCOPY;  Service: Gastroenterology;  Laterality: N/A;  ESOPHAGITIS, GASTRITIS, HIATAL HERNIA    ENDOSCOPY      ENDOSCOPY N/A 04/16/2024    Procedure: ESOPHAGOGASTRODUODENOSCOPY WITH BIOPSIES;  Surgeon: Karine Orlando MD;  Location: [facility] ENDOSCOPY;  Service: Gastroenterology;  Laterality: N/A;  ESOPHAGITIS, HIATAL HERNIA    ENDOSCOPY W/ PEG TUBE  PLACEMENT N/A 12/10/2024    Procedure: ESOPHAGOGASTRODUODENOSCOPY WITH PERCUTANEOUS ENDOSCOPIC GASTROSTOMY TUBE INSERTION WITH ANESTHESIA;  Surgeon: Rodger Ortega MD;  Location: [facility] ENDOSCOPY;  Service: Gastroenterology;  Laterality: N/A;  PEG TUBE INSERTION    HYSTERECTOMY      LYMPHADENECTOMY      PAIN PUMP INSERTION/REVISION N/A 10/18/2019    Procedure: PAIN PUMP INSERTION [facility];  Surgeon: Horace Rios MD;  Location: [facility];  Service: Pain Management    PAIN PUMP INSERTION/REVISION N/A 11/23/2020    Procedure: pain pump removal;  Surgeon: Horace Rios MD;  Location: [facility];  Service: Pain Management;  Laterality: N/A;    PEG TUBE INSERTION N/A 07/26/2023    Procedure: PERCUTANEOUS ENDOSCOPIC GASTROSTOMY TUBE INSERTION;  Surgeon: Kody Mercer MD;  Location: [facility] ENDOSCOPY;  Service: General;  Laterality: N/A;  SUCCESSFUL PEG TUBE PLACE PLACEMENT    PORTACATH PLACEMENT      IN LEFT CHEST REPORTS THAT IT IS NOT FUNCTIONING    SKIN BIOPSY      TONSILLECTOMY      TUBAL ABDOMINAL LIGATION      TUMOR REMOVAL      vaginal /anal area         Physical Assessment:  Rash 06/07/25 1242 Right upper back (Active)   Color pink;red 06/09/25 2130   Configuration/Shape round 06/09/25 2130   Borders raised;irregular 06/09/25 2130   Characteristics pain/discomfort;burning 06/09/25 2130   Care, Rash open to air 06/09/25 2130       Wound 01/02/25 1154 Right lateral leg (Active)   Wound Image   06/10/25 0955   Dressing Appearance intact;dry 06/10/25 0955   Dressing Removed Type non-adherent;petroleum-based;gauze;silicone border foam 06/10/25 0955   Confirmed Empty Wound Bed Yes, visual inspection of wound bed 06/10/25 0955   Closure None 06/10/25 0955   Base red;dry;scab 06/10/25 0955   Periwound dry;pink;redness;intact 06/10/25 0955   Periwound Temperature warm 06/10/25 0955   Periwound Skin Turgor soft 06/10/25 0955   Edges rolled/closed 06/10/25 0955   Drainage Amount  none 06/10/25 0955   Care, Wound cleansed with;sterile normal saline 06/10/25 0955   Dressing Care open to air 06/10/25 0955   Periwound Care dry periwound area maintained 06/09/25 2130       Wound 01/02/25 1158 Left distal calf (Active)   Wound Image   06/10/25 0955   Dressing Appearance intact;dry 06/10/25 0955   Dressing Removed Type non-adherent;petroleum-based;gauze;silicone border foam 06/10/25 0955   Confirmed Empty Wound Bed Yes, visual inspection of wound bed 06/10/25 0955   Closure None 06/10/25 0955   Base yellow;moist;pink 06/10/25 0955   Yellow (%), Wound Tissue Color 100 06/10/25 0955   Periwound intact;moist;pale white;macerated 06/10/25 0955   Periwound Temperature warm 06/10/25 0955   Periwound Skin Turgor soft 06/10/25 0955   Edges open 06/10/25 0955   Drainage Characteristics/Odor yellow 06/10/25 0955   Drainage Amount scant 06/10/25 0955   Care, Wound cleansed with;sterile normal saline 06/10/25 0955   Dressing Care dressing applied;silver impregnated;hydrofiber;silicone border foam 06/10/25 0955   Periwound Care absorptive dressing applied 06/10/25 0955       Wound 02/03/25 1131 Right anterior leg (Active)   Wound Image   06/10/25 0955   Dressing Appearance intact;dry 06/10/25 0955   Dressing Removed Type non-adherent;petroleum-based;gauze;silicone border foam 06/10/25 0955   Confirmed Empty Wound Bed Yes, visual inspection of wound bed 06/10/25 0955   Closure None 06/10/25 0955   Base moist;red;slough 06/10/25 0955   Periwound pink;maroon/purple;dry 06/10/25 0955   Periwound Temperature warm 06/10/25 0955   Periwound Skin Turgor soft 06/10/25 0955   Edges open 06/10/25 0955   Drainage Characteristics/Odor serosanguineous 06/10/25 0955   Drainage Amount scant 06/10/25 0955   Care, Wound cleansed with;sterile normal saline 06/10/25 0955   Dressing Care dressing applied;silver impregnated;hydrofiber;silicone border foam 06/10/25 0955   Periwound Care absorptive dressing applied 06/10/25 0955        Wound 05/04/25 1206 Right distal calf (Active)   Wound Image   06/10/25 0955   Dressing Appearance intact;dry 06/10/25 0955   Dressing Removed Type non-adherent;petroleum-based;gauze;silicone border foam 06/10/25 0955   Confirmed Empty Wound Bed Yes, visual inspection of wound bed 06/10/25 0955   Closure None 06/10/25 0955   Base moist;red;scab 06/10/25 0955   Periwound intact;dry;redness 06/10/25 0955   Periwound Temperature warm 06/10/25 0955   Periwound Skin Turgor soft 06/10/25 0955   Edges open 06/10/25 0955   Drainage Characteristics/Odor serosanguineous 06/10/25 0955   Drainage Amount scant 06/10/25 0955   Care, Wound cleansed with;sterile normal saline 06/10/25 0955   Dressing Care dressing applied;silver impregnated;hydrofiber;silicone border foam 06/10/25 0955   Periwound Care absorptive dressing applied 06/10/25 0955       Wound 05/04/25 1255 Left cheek (Active)   Dressing Appearance open to air 06/09/25 2130       Wound 06/10/25 1357 Right anterior fifth toe Vascular Ulcer (Active)   Wound Image   06/10/25 0955   Dressing Appearance intact;dry 06/10/25 0955   Dressing Removed Type non-adherent;petroleum-based;gauze;silicone border foam 06/10/25 0955   Confirmed Empty Wound Bed Yes, visual inspection of wound bed 06/10/25 0955   Closure None 06/10/25 0955   Base necrotic;yellow;dry;moist;slough 06/10/25 0955   Red (%), Wound Tissue Color 5 06/10/25 0955   Yellow (%), Wound Tissue Color 95 06/10/25 0955   Periwound intact;dry;redness 06/10/25 0955   Periwound Temperature warm 06/10/25 0955   Periwound Skin Turgor soft 06/10/25 0955   Edges open 06/10/25 0955   Drainage Characteristics/Odor yellow 06/10/25 0955   Drainage Amount scant 06/10/25 0955   Care, Wound cleansed with;sterile normal saline 06/10/25 0955   Dressing Care dressing applied;silver impregnated;hydrofiber;silicone border foam 06/10/25 9971   Periwound Care absorptive dressing applied 06/10/25 8025        Wound Check / Follow-up:  Patient  was seen today for a wound check and dressing change. Patient was awake and alert at the time of the assessment; she was agreeable to the visit.     Patient with scattered chronic ulcerations to the bilateral lower legs and right 5th toe. Several ulcerations to the right lower legs are covered in dry crusting, there are ulceration to the posterior right lower leg that are red and dry.  Ulceration to the right anterior lower leg is red and moist, with some yellow tissue present as well; the ulceration to the Right posterior lower leg does have some red moist tissue present to the wound base, there is crusted tissue cover the wound edges and expanding to the wound base. Left posterior lower leg is yellow and moist with some red granulation scattered throughout the wound base, the periwound is macerated and pale white. Recommending to cover all wound with silver impregnated hydrofiber and change the dressings daily.    Right lateral 5th toes with dry necrotic tissue obscuring a majority of the wound base with sloughing tissue present to the wound edges. Periwound is pink and dry. Recommending to cover with silver impregnated hydrofiber and change the dressing daily.     Buttocks scattered areas of erosion but all tissue blanches. Recommending to apply blue top moisture barrier TID / AL and leave open to air. Encourage the patient to turn herself every two hours.    Impression: scattered ulcerations to the bilateral lower legs    Short term goals: Regain skin integrity, skin protection, topical treatment, pressure reduction, moisture prevention, daily dressing changes.     Isha Hughes RN    6/10/2025    14:01 EDT

## 2025-06-10 NOTE — NURSING NOTE
Sheila Watson (daughter) called for update and suggestions on tube feeding and nutritional concerns. Told daughter I will look at chart orders and update as I can.

## 2025-06-10 NOTE — NURSING NOTE
Palliative notified of patient and daughter wishes to be a full code and not comfort care at this time after speaking with oncologist last night.  Attending MD aware and has made changes in EPIC.  Patient plans to return home with daughter and continue treatments at this time.      At this time, no further needs identified. Palliative Care will sign off. If new needs arise, please place new Palliative Care consult order.    Magdalene Harrington RN, [facility]  Palliative Care

## 2025-06-10 NOTE — PROGRESS NOTES
[facility]   Hospitalist Progress Note  Date: 6/10/2025  Patient Name: Isha Llanes  : 1965  MRN: [id number]  Date of admission: 2025      Subjective   Subjective     Chief complaint: Shortness of breath     Summary:  59-year-old female with history of chronic pain with history of lymphoma status post chemotherapy, laryngeal cancer status post radiation therapy, squamous cell lung cancer not treated yet, anaplastic large cell lymphoma ALK negative, not a candidate for chemotherapy, in process of transitioning providers, pending outpatient imaging, intrathecal Dilaudid pain pump in situ, aortic valve stenosis status post TAVR, COPD, heart failure preserved ejection fraction, diabetes mellitus, history of DVT, fatty liver disease, anxiety, PTSD, venous insufficiency, hospitalized after shortness of breath and altered mental status with minimal responsiveness, placed on BiPAP, Narcan given, mentation improved, admitted with acute metabolic encephalopathy due to combination of opioids and COPD with acute hypoxemic respiratory failure due to COPD exacerbation with acute hypercapnia respiratory failure due to COPD and opioid use, palliative care consulted to establish goals of care, hematology consulted.  Pain control ongoing issue.  Underlying T6, T7, T12 compression fractures.  Questionable shingles rash on back, acyclovir initiated by hematology.  Prognosis guarded, awaiting patient's final decision on goals of care.  She initially chose comfort measures only but quickly reversed her decision.  Transition back to full code.  TLSO brace ordered for thoracic fractures.  Long-acting pain controlling medications were initiated.  Will continue to achieve therapeutic pain control without hemodynamic compromise with goal to discharge home.     Interval follow-up: Patient seen and examined, resting comfortably today, received pain controlling medication.  IV pain controlling medications discontinued as  patient is being transitioned to morphine long-acting formula.  TLSO brace ordered.  Patient recalling her decision to go comfort and pursuing full aggressive therapy and treatment.  Patient was sleeping this morning.  Was not engageable in conversation.  Creatinine 0.43, BUN 11, potassium 4.1, sodium 131, white blood cell count 6000, hemoglobin 12.9.    Review of systems:  Unable to obtain due to patient's somnolence state  Objective   Objective     Vitals:   Temp:  [98.2 °F (36.8 °C)-98.7 °F (37.1 °C)] 98.4 °F (36.9 °C)  Heart Rate:  [] 107  Resp:  [16-20] 20  BP: ()/(58-82) 104/82  Flow (L/min) (Oxygen Therapy):  [2] 2  Physical Exam               Constitutional: Chronically ill-appearing.              Eyes: Pupils equal, sclerae anicteric, no conjunctival injection              HENT: NCAT, mucous membranes moist              Neck: Supple              Respiratory: Diffuse wheezing              Cardiovascular: RRR, no murmurs, rubs, or gallops, palpable pedal pulses bilaterally              Gastrointestinal: Positive bowel sounds, soft, nontender, nondistended              Musculoskeletal: Bilateral venous changes.  She does have a couple of open lesions.  No streaking erythema up the leg.  No warmth.  Chronic              Psychiatric: Somnolent              Neurologic: Somnolent              Skin: Rash on back, nonvesicular nonspecific to dermatome    Result Review    Result Review:  I have personally reviewed the pertinent results from the past 24 hours to 6/10/2025 13:59 EDT and agree with these findings:  [x]  Laboratory   CBC          6/7/2025    04:58 6/8/2025    05:10 6/10/2025    10:21   CBC   WBC 7.94  6.76  6.42    RBC 5.10  5.27  5.19    Hemoglobin 12.7  13.3  12.9    Hematocrit 42.0  43.0  42.2    MCV 82.4  81.6  81.3    MCH 24.9  25.2  24.9    MCHC 30.2  30.9  30.6    RDW 14.9  15.2  14.7    Platelets 156  152  173      BMP          6/7/2025    04:58 6/8/2025    05:10 6/10/2025    10:21    BMP   BUN 7.3  6.8  11.5    Creatinine 0.30  0.37  0.43    Sodium 131  131  131    Potassium 3.7  4.0  4.1    Chloride 95  94  94    CO2 23.2  24.3  27.0    Calcium 8.2  8.3  8.5      LIVER FUNCTION TESTS:      Lab 06/10/25  1021 06/08/25  0510 06/07/25  0458 06/06/25  0435 06/05/25  1106   TOTAL PROTEIN 6.1 5.7* 5.7* 5.9* 6.0   ALBUMIN 2.9* 3.1* 3.0* 3.0* 3.2*   GLOBULIN  --   --   --  2.9 2.8   ALT (SGPT) 13 10 10 12 9   AST (SGOT) 12 12 13 13 10   BILIRUBIN 0.6 0.6 0.5 0.3 0.3   INDIRECT BILIRUBIN 0.4 0.4 0.3  --   --    BILIRUBIN DIRECT 0.2 0.2 0.2  --   --    ALK PHOS 106 107 119* 129* 129*   LIPASE  --   --   --   --  66*       [x]  Microbiology   Microbiology Results (last 10 days)       Procedure Component Value - Date/Time    Clostridioides difficile Toxin - Stool, Per Rectum [[id number]] Collected: 06/06/25 1205    Lab Status: Final result Specimen: Stool from Per Rectum Updated: 06/06/25 1250    Narrative:      The following orders were created for panel order Clostridioides difficile Toxin - Stool, Per Rectum.  Procedure                               Abnormality         Status                     ---------                               -----------         ------                     Clostridioides difficile...[[id number]]                      Final result                 Please view results for these tests on the individual orders.    Clostridioides difficile Toxin, PCR - Stool, Per Rectum [[id number]] Collected: 06/06/25 1205    Lab Status: Final result Specimen: Stool from Per Rectum Updated: 06/06/25 1250     Toxigenic C. difficile by PCR Negative     027 Toxin Presumptive Negative    Narrative:      The result indicates the absence of toxigenic C. difficile from stool specimen.     Blood Culture - Blood, Arm, Right [[id number]]  (Normal) Collected: 06/05/25 1402    Lab Status: Preliminary result Specimen: Blood from Arm, Right Updated: 06/09/25 1415     Blood Culture No growth at 4 days    Blood Culture  - Blood, Arm, Left [[id number]]  (Normal) Collected: 06/05/25 1402    Lab Status: Preliminary result Specimen: Blood from Arm, Left Updated: 06/09/25 1415     Blood Culture No growth at 4 days    S. Pneumo Ag Urine or CSF - Urine, Straight Cath [[id number]]  (Normal) Collected: 06/05/25 1343    Lab Status: Final result Specimen: Urine from Straight Cath Updated: 06/05/25 1558     Strep Pneumo Ag Negative    Legionella Antigen, Urine - Urine, Straight Cath [[id number]]  (Normal) Collected: 06/05/25 1343    Lab Status: Edited Result - FINAL Specimen: Urine from Straight Cath Updated: 06/05/25 1626     LEGIONELLA ANTIGEN, URINE Negative    Mycoplasma Pneumoniae Antibody, IgM - Blood, [[id number]]  (Normal) Collected: 06/05/25 1106    Lab Status: Final result Specimen: Blood Updated: 06/05/25 1619     Mycoplasma pneumo IgM Negative              [x]  Radiology CT Soft Tissue Neck With Contrast  Result Date: 6/9/2025  Impression: 1. Stable exam with soft tissue thickening involving the left side of the oropharynx and hypopharynx to the level of the area epiglottic folds, unchanged in appearance from prior study. 2. Stable borderline size 9 x 7 mm left level 1B lymph node. 3. Emphysema Electronically Signed: Poncho Macias MD  6/9/2025 9:40 AM EDT  Workstation ID: [id number]    CT Chest With Contrast Diagnostic  Result Date: 6/8/2025  1.There is new right internal mammary adenopathy. There is also new left retropectoral adenopathy. Comparison with the patient's more recent prior outside study would be beneficial. 2.New compression fractures at T6, T7 and T12 as described. No retropulsion. 3.Interval cavitation of the bilobed nodule in the right lower lobe since the prior study. There are some small adjacent satellite nodules. 4.Pulmonary emphysema. 5.Probable splenomegaly. 6.Additional findings as noted above. Electronically Signed: Des Carlson MD  6/8/2025 8:50 PM EDT  Workstation ID: [id number]    XR Chest 1 View  Result  Date: 6/7/2025  Emphysema. No active disease. Electronically Signed: Des Carlson MD  6/7/2025 9:24 PM EDT  Workstation ID: [id number]    XR Chest 1 View  Result Date: 6/5/2025  Impression: Stable chest examination without acute cardiopulmonary process. Electronically Signed: Mikayla Linares MD  6/5/2025 11:06 AM EDT  Workstation ID: [id number]        [x]  EKG/Telemetry   ECG 12 Lead Chest Pain   Final Result   HEART JLOI=906  bpm   RR Sivggcwm=132  ms   TX Ceowhifp=833  ms   P Horizontal Axis=-8  deg   P Front Axis=81  deg   QRSD Agkavnif=778  ms   QT Sibnioag=872  ms   DBxV=155  ms   QRS Axis=44  deg   T Wave Axis=145  deg   - ABNORMAL ECG -   Sinus tachycardia   Atrial premature complexes   Probable  left atrial enlargement   Left bundle branch block   ST elevation secondary to IVCD   When compared with ECG of 07-Jun-2025 21:11:31,   No significant change   Electronically Signed By: Jovany Mendoza ([facility]) 2025-06-09 13:37:28   Date and Time of Study:2025-06-08 20:00:42      ECG 12 Lead Chest Pain   Preliminary Result   HEART RATE=99  bpm   RR Eajxhewy=579  ms   TX Fyvemede=679  ms   P Horizontal Axis=-10  deg   P Front Axis=72  deg   QRSD Xzrzaduw=971  ms   QT Szqnqozp=030  ms   GXrJ=443  ms   QRS Axis=36  deg   T Wave Axis=146  deg   - ABNORMAL ECG -   Sinus rhythm   Left atrial enlargement   Left bundle branch block   ST elevation secondary to IVCD   Date and Time of Study:2025-06-07 21:11:31      ECG 12 Lead ED Triage Standing Order; Chest Pain   Final Result   HEART SEXA=252  bpm   RR Wjxptkgs=108  ms   TX Sudnidut=129  ms   P Horizontal Axis=5  deg   P Front Axis=68  deg   QRSD Nqxklwjr=309  ms   QT Kvrfepce=995  ms   DApC=287  ms   QRS Axis=31  deg   T Wave Axis=159  deg   - ABNORMAL ECG -   Sinus tachycardia   Probable  left atrial enlargement   Left bundle branch block   ST elevation secondary to IVCD   When compared with ECG of 06-May-2025 18:01:50,   Significant rate increase   Electronically Signed By:  Rehan Barrera ([facility]) 2025-06-05 15:13:11   Date and Time of Study:2025-06-05 10:36:59          []  Cardiology/Vascular   []  Pathology  [x]  Old records  []  Other:    Assessment & Plan   Assessment / Plan     Assessment/Plan:    Assessment:  Acute metabolic encephalopathy due to opioids and COPD  Acute hypoxic respiratory failure due to COPD  Acute hypercapnic respiratory failure due to COPD and opioid use  Type 2 diabetes with hyperglycemia  Heart failure without exacerbation  Hodgkin's lymphoma in 2008  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 Mancuso, morphine 15 mg twice a day ordered long-acting formula  TLSO brace  Acyclovir per hematology  Imodium for diarrhea  Continue Brovana Pulmicort nebs twice daily  Wean oxygen per tolerance  Continue Paxil 20 mg daily  Continue metoprolol XL 25 mg nightly  Continue Lasix 20 mg daily  PT/OT  A.m. labs  Full code  DVT prophylaxis with Lovenox  Clinical course to dictate further management  Discussed with nurse at the bedside  Prognosis poor  Home when pain controlled    VTE Prophylaxis:  Mechanical VTE prophylaxis orders are present.        CODE STATUS:   Code Status (Patient has no pulse and is not breathing): CPR (Attempt to Resuscitate)  Medical Interventions (Patient has pulse or is breathing): Full Support  Level Of Support Discussed With: Patient        Electronically signed by Karen Griffith MD, 6/10/2025, 13:59 EDT.    Portions of this documentation were transcribed electronically from a voice recognition software.  I confirm all data accurately represents the service(s) I performed at today's visit.

## 2025-06-10 NOTE — CONSULTS
[facility]   Neurosurgery Consult    Patient Name:Isha Llanes  : 1965  MRN: [id number]  Primary Care Physician: Virgil Salas MD  Date of admission: 2025    Subjective   Subjective     Chief Complaint: Thoracic pain    Chest Pain   Associated symptoms include back pain.      Isha Llanes is a 59 y.o. female had a fall from her motorized wheelchair.  She has had pain since that time.  Its gotten worse over time.  She had imaging which demonstrated multiple compression fractures throughout the thoracic spine.  She has cancer and is currently treated for this.  She has never been diagnosed with osteoporosis to her knowledge.  She has a history of cancer.    Review of Systems   Cardiovascular:  Positive for chest pain.   Musculoskeletal:  Positive for back pain.         Personal History     Past Medical History:   Diagnosis Date    Anesthesia     REPORTS HAS GOT REAL EMOTIONAL AND HAS BECOME ANGRY BEFORE    Anxiety     Aortic stenosis, severe     HAS BIO VALVE REPLACED    Arthritis     Asthma     Back pain     Blood clotting disorder     test to find out what type    Cancer     CHF (congestive heart failure)     Chronic low back pain with sciatica     Chronic pain disorder     COPD (chronic obstructive pulmonary disease)     Coronary artery disease     looking to follow with Cardio    Diabetes mellitus     TYPE 2    Dyspnea on effort     Fatty liver     GERD (gastroesophageal reflux disease)     H/O lumpectomy     H/O: hysterectomy     Hiatal hernia     History of degenerative disc disease     History of kidney stones     History of pulmonary embolus (PE)     History of transfusion     AS A CHILD NO TRANSFUSION REACTION    Hodgkin's lymphoma     23  5 YEARS SINCE LAST CHEMO TX    Hypertension     Immobility     Lupus     Nausea vomiting and diarrhea 2024    Panic disorder     Pelvic pain     Peripheral neuropathy     Personal history of DVT (deep vein thrombosis)     over  20 years    PONV (postoperative nausea and vomiting)     Presence of intrathecal pump     PTSD (post-traumatic stress disorder)     Sacroiliac joint disease     SI (sacroiliac) joint inflammation     Venous insufficiency        Past Surgical History:   Procedure Laterality Date    ABDOMINAL SURGERY      BACK SURGERY      SPINAL FUSION     BACK SURGERY      BLADDER REPAIR      BRONCHOSCOPY WITH ION ROBOTIC ASSIST N/A 4/22/2025    Procedure: BRONCHOSCOPY WITH ION ROBOT: REBUS, EBUS, NEEDLE ASPIRATES, BIOPSIES, BRUSHINGS, BAL, WASHINGS: insertion of lighted robot navigated instrument to view inside the lung;  Surgeon: Enzo Segal MD;  Location: [facility] MAIN OR;  Service: Robotics - Pulmonary;  Laterality: N/A;    CARDIAC CATHETERIZATION N/A 03/06/2019    Procedure: Valvuloplasty;  Surgeon: Wayne Johnson MD;  Location: [facility] CATH INVASIVE LOCATION;  Service: Cardiology    CARDIAC CATHETERIZATION      CARDIAC CATHETERIZATION Left 05/31/2023    Procedure: Cardiac Catheterization/Vascular Study;  Surgeon: Poncho Reid MD;  Location: [facility] CATH INVASIVE LOCATION;  Service: Cardiovascular;  Laterality: Left;    CARDIAC SURGERY      Mechanical valve    CARDIAC VALVE REPLACEMENT  2021    CHOLECYSTECTOMY      COLONOSCOPY N/A 12/29/2021    Procedure: COLONOSCOPY;  Surgeon: Karine Orlando MD;  Location: [facility] ENDOSCOPY;  Service: Gastroenterology;  Laterality: N/A;  POOR PREP    COLONOSCOPY N/A 04/13/2022    Procedure: COLONOSCOPY WITH POLYPECTOMY;  Surgeon: Karine Orlando MD;  Location: [facility] ENDOSCOPY;  Service: Gastroenterology;  Laterality: N/A;  COLON POLYP, HEMORRHOIDS, POOR PREP    COLONOSCOPY      DIRECT LARYNGOSCOPY, ESOPHAGOSCOPY, BRONCHOSCOPY N/A 05/22/2023    Procedure: DIRECT LARYNGOSCOPY WITH BIOPSIES , ESOPHAGOSCOPY, BRONCHOSCOPY;  Surgeon: Ronnie Mcgarry MD;  Location: [facility] OR OSC;  Service: ENT;  Laterality: N/A;    ENDOSCOPY N/A 12/29/2021    Procedure:  ESOPHAGOGASTRODUODENOSCOPY;  Surgeon: Karine Orlando MD;  Location: [facility] ENDOSCOPY;  Service: Gastroenterology;  Laterality: N/A;  ESOPHAGITIS, GASTRITIS, HIATAL HERNIA    ENDOSCOPY      ENDOSCOPY N/A 04/16/2024    Procedure: ESOPHAGOGASTRODUODENOSCOPY WITH BIOPSIES;  Surgeon: Karine Orlando MD;  Location: [facility] ENDOSCOPY;  Service: Gastroenterology;  Laterality: N/A;  ESOPHAGITIS, HIATAL HERNIA    ENDOSCOPY W/ PEG TUBE PLACEMENT N/A 12/10/2024    Procedure: ESOPHAGOGASTRODUODENOSCOPY WITH PERCUTANEOUS ENDOSCOPIC GASTROSTOMY TUBE INSERTION WITH ANESTHESIA;  Surgeon: Rodger Ortega MD;  Location: [facility] ENDOSCOPY;  Service: Gastroenterology;  Laterality: N/A;  PEG TUBE INSERTION    HYSTERECTOMY      LYMPHADENECTOMY      PAIN PUMP INSERTION/REVISION N/A 10/18/2019    Procedure: PAIN PUMP INSERTION [facility] 10-18-19 [place];  Surgeon: Horace Rios MD;  Location[facility];  Service: Pain Management    PAIN PUMP INSERTION/REVISION N/A 11/23/2020    Procedure: pain pump removal;  Surgeon: Horace Rios MD;  Location: [facility];  Service: Pain Management;  Laterality: N/A;    PEG TUBE INSERTION N/A 07/26/2023    Procedure: PERCUTANEOUS ENDOSCOPIC GASTROSTOMY TUBE INSERTION;  Surgeon: Kody Mercer MD;  Location: [facility] ENDOSCOPY;  Service: General;  Laterality: N/A;  SUCCESSFUL PEG TUBE PLACE PLACEMENT    PORTACATH PLACEMENT      IN LEFT CHEST REPORTS THAT IT IS NOT FUNCTIONING    SKIN BIOPSY      TONSILLECTOMY      TUBAL ABDOMINAL LIGATION      TUMOR REMOVAL      vaginal /anal area       Family History: Her family history includes ADD / ADHD in her brother, granddaughter, grandson, and nephew; Anxiety disorder in her mother; Bipolar disorder in her sister; Depression in her sister; Heart failure in her mother; Pancreatic cancer in her paternal grandmother and sister; Prostate cancer in her father; Thyroid cancer in her maternal grandmother.     Social History: She   reports that she has been smoking cigarettes. She started smoking about 46 years ago. She has a 92.9 pack-year smoking history. She has been exposed to tobacco smoke. She has never used smokeless tobacco. She reports that she does not drink alcohol and does not use drugs.    Home Medications:  ALPRAZolam, Insulin Lispro (1 Unit Dial), PARoxetine, budesonide, famotidine, furosemide, ipratropium-albuterol, loperamide, metoprolol succinate XL, ondansetron ODT, oxyCODONE, pain, and predniSONE    Allergies:  She is allergic to amoxicillin, ceclor [cefaclor], penicillins, sulfa antibiotics, valium [diazepam], ambien [zolpidem], aspirin, ativan [lorazepam], benadryl [diphenhydramine], biaxin [clarithromycin], cephalexin, clindamycin, compazine [prochlorperazine edisylate], contrast dye (echo or unknown ct/mr), doxycycline, nsaids, phenergan [promethazine hcl], promethazine, and vancomycin.    Objective    Objective     Vitals:    Temp:  [98.2 °F (36.8 °C)-98.7 °F (37.1 °C)] 98.7 °F (37.1 °C)  Heart Rate:  [] 113  Resp:  [16-20] 20  BP: ()/(58-73) 88/62  Flow (L/min) (Oxygen Therapy):  [2] 2  Output by Drain (mL) 06/09/25 0701 - 06/09/25 1900 06/09/25 1901 - 06/10/25 0700 06/10/25 0701 - 06/10/25 0813 Range Total   Requested LDAs do not have output data documented.       Physical Exam  Constitutional:       Comments: She appears uncomfortable and prefers the fetal position.   HENT:      Mouth/Throat:      Comments: Voice somewhat raspy and difficult to understand  Neurological:      Mental Status: She is alert.          Result Review    Result Review:  I have personally reviewed the results from the time of this admission to 6/10/2025 08:13 EDT and agree with these findings:  []  Laboratory  []  Microbiology  []  Radiology  []  EKG/Telemetry   []  Cardiology/Vascular   []  Pathology  []  Old records  []  Other:  Most notable findings include: Thoracic 6 fracture with approximately 80% loss of height centrally.   Superior endplate fracture of T7 and T12.    Assessment & Plan   Assessment / Plan     Brief Patient Summary:  Isha Llanes is a 59 y.o. female had a fall from wheelchair with multiple thoracic fractures.    Active Hospital Problems:  Active Hospital Problems    Diagnosis     **Hypercapnia      Plan:   A TLSO brace when upright may be beneficial.  Her pain will be somewhat difficult to treat with her chronic pain disorder and her other medical issues.    VTE Prophylaxis:  Mechanical VTE prophylaxis orders are present.

## 2025-06-10 NOTE — PROGRESS NOTES
Enter Query Response Below      Query Response: Compression fractures developed during admission    Electronically signed by Karen Griffith MD, 06/10/25, 6:30 AM EDT.               If applicable, please update the problem list.

## 2025-06-10 NOTE — PROGRESS NOTES
[facility]     Progress Note    Patient Name: Isha Llanes  : 1965  MRN: [id number]  Primary Care Physician:  Virgil Salas MD  Date of admission: 2025    Subjective   Subjective     Chief Complaint: Patient fell down from the wheelchair and fractured her spine has been seen by Dr. Gonzalez and has been ordered to get brace patient now does not want to go home and get some treatment at [facility] for her lymphoma she is a lot more cooperative now I had a long discussion with the patient as well as her family and they feel that she is not ready to just go on the comfort care that she would like to try the immunotherapy which supposed to get in [place] patient's prognosis is of course poor with multiple malignancies in the past with the fractures chronic pain she already has a Dilaudid pump and we have discussed regarding discontinuing the rest of the other medications and just give her morphine sulfate by mouth and there is other conservative measures once the pain is fairly well-controlled then patient can be discharged home she already has an appointment to get a PET scan as outpatient as per arrangements from [facility]    HPI: Patient with multiple malignancies fracture causing the pain but she seems to be a lot more comfortable today    Review of Systems   All systems were reviewed and negative except for: Has been reviewed    Objective   Objective     Vitals:   Temp:  [98.2 °F (36.8 °C)-98.7 °F (37.1 °C)] 98.7 °F (37.1 °C)  Heart Rate:  [] 113  Resp:  [16-20] 20  BP: ()/(58-73) 88/62  Flow (L/min) (Oxygen Therapy):  [2] 2    Physical Exam    Constitutional: Alert and oriented not in acute distress   Eyes: Pupils equal reacting to light and accommodation   HENT: Normocephalic   Neck: No lymphadenopathy   Respiratory: No rales or rhonchi   Cardiovascular: S1-S2 normal no S3 or S4   Gastrointestinal: No palpable organomegaly   Musculoskeletal: No  deformities   Neurologic: No localizing signs  Skin: No rash       Result Review    Result Review:  I have personally reviewed the results from the time of this admission to 6/10/2025 08:06 EDT and agree with these findings:  [x]  Laboratory  []  Microbiology  [x]  Radiology  []  EKG/Telemetry   []  Cardiology/Vascular   []  Pathology  []  Old records  []  Other:  Most notable findings include: Has been reviewed and discussed    Assessment & Plan   Assessment / Plan     Brief Patient Summary:  Isha Llanes is a 59 y.o. female who with compression fracture of the thoracic lumbar spine had a long discussion with Dr. Gonzalez regarding the management    Active Hospital Problems:  Active Hospital Problems    Diagnosis     **Hypercapnia        Plan:   Change the pain medication we will get the long-acting morphine for the pain control    VTE Prophylaxis:  Mechanical VTE prophylaxis orders are present.        CODE STATUS:   Code Status (Patient has no pulse and is not breathing): CPR (Attempt to Resuscitate)  Medical Interventions (Patient has pulse or is breathing): Full Support  Level Of Support Discussed With: Patient    Disposition:  I expect patient to be discharged after the pain has been controlled and patient is able to be discharged.    Electronically signed by Teodoro Mancuso MD, 06/10/25, 8:06 AM EDT.      Part of this note may be an electronic transcription/translation of spoken language to printed text using the Dragon Dictation System.

## 2025-06-10 NOTE — NURSING NOTE
Sheila Watson (daughter) called for patient update, updated daughter on status as being stable with pain

## 2025-06-11 VITALS
TEMPERATURE: 98.8 F | BODY MASS INDEX: 18.53 KG/M2 | HEIGHT: 66 IN | OXYGEN SATURATION: 96 % | SYSTOLIC BLOOD PRESSURE: 104 MMHG | HEART RATE: 108 BPM | WEIGHT: 115.3 LBS | RESPIRATION RATE: 18 BRPM | DIASTOLIC BLOOD PRESSURE: 72 MMHG

## 2025-06-11 PROCEDURE — 25010000002 KETOROLAC TROMETHAMINE PER 15 MG: Performed by: STUDENT IN AN ORGANIZED HEALTH CARE EDUCATION/TRAINING PROGRAM

## 2025-06-11 RX ORDER — MORPHINE SULFATE 2 MG/ML
1 INJECTION, SOLUTION INTRAMUSCULAR; INTRAVENOUS ONCE
Status: DISCONTINUED | OUTPATIENT
Start: 2025-06-11 | End: 2025-06-11 | Stop reason: HOSPADM

## 2025-06-11 RX ORDER — KETOROLAC TROMETHAMINE 30 MG/ML
15 INJECTION, SOLUTION INTRAMUSCULAR; INTRAVENOUS ONCE
Status: COMPLETED | OUTPATIENT
Start: 2025-06-11 | End: 2025-06-11

## 2025-06-11 RX ADMIN — KETOROLAC TROMETHAMINE 15 MG: 30 INJECTION, SOLUTION INTRAMUSCULAR; INTRAVENOUS at 01:11

## 2025-06-11 NOTE — DISCHARGE SUMMARY
[facility]         HOSPITALIST  DISCHARGE SUMMARY    Patient Name: Isha Llanes  : 1965  MRN: [id number]    Date of Admission: 2025  Date of Discharge:  2025 Left AMA  Primary Care Physician: Virgil Salas MD    Consults       No orders found from 2025 to 2025.            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       Hospital Course     Hospital Course:  59-year-old female with history of chronic pain with history of lymphoma status post chemotherapy, laryngeal cancer status post radiation therapy, squamous cell lung cancer not treated yet, anaplastic large cell lymphoma ALK negative, not a candidate for chemotherapy, in process of transitioning providers, pending outpatient imaging, intrathecal Dilaudid pain pump in situ, aortic valve stenosis status post TAVR, COPD, heart failure preserved ejection fraction, diabetes mellitus, history of DVT, fatty liver disease, anxiety, PTSD, venous insufficiency, hospitalized after shortness of breath and altered mental status with minimal responsiveness, placed on BiPAP, Narcan given, mentation improved, admitted with acute metabolic encephalopathy due to combination of opioids and COPD with acute hypoxemic respiratory failure due to COPD exacerbation with acute hypercapnia respiratory failure due to COPD and opioid use, palliative care consulted to establish goals of care,  hematology consulted.  Pain control ongoing issue.  Underlying T6, T7, T12 compression fractures.  Questionable shingles rash on back, acyclovir initiated by hematology.  Prognosis guarded, awaiting patient's final decision on goals of care.  She initially chose comfort measures only but quickly reversed her decision.  Transition back to full code.  TLSO brace ordered for thoracic fractures.  Long-acting pain controlling medications were initiated.  Will continue to achieve therapeutic pain control without hemodynamic compromise with goal to discharge home.  Unfortunately the patient got fed up and proceeded to walk to the wheelchair and towards the elevators and subsequently left AMA despite education from staff.  Overall prognosis guarded.    Day of Discharge     Vital Signs:  Temp:  [98.5 °F (36.9 °C)-98.8 °F (37.1 °C)] 98.8 °F (37.1 °C)  Heart Rate:  [108-114] 108  Resp:  [18-20] 18  BP: ()/(68-72) 104/72  Flow (L/min) (Oxygen Therapy):  [2] 2    Discharge Details        Discharge Medications        ASK your doctor about these medications        Instructions Start Date   ALPRAZolam 0.25 MG tablet  Commonly known as: XANAX   0.25 mg, 3 times daily      budesonide 0.5 MG/2ML nebulizer solution  Commonly known as: Pulmicort   0.5 mg, Nebulization, 2 Times Daily - RT, Rinse mouth out after each use      famotidine 40 MG tablet  Commonly known as: Pepcid   40 mg, Oral, Every Night at Bedtime      furosemide 20 MG tablet  Commonly known as: LASIX   20 mg, Daily      Insulin Lispro (1 Unit Dial) 100 UNIT/ML solution pen-injector  Commonly known as: HUMALOG   Inject 12 Units under the skin into the appropriate area as directed Daily.      ipratropium-albuterol 0.5-2.5 mg/3 ml nebulizer  Commonly known as: DUO-NEB   3 mL, Nebulization, 4 Times Daily PRN      loperamide 2 MG capsule  Commonly known as: IMODIUM   2 mg, Oral, 4 Times Daily PRN      metoprolol succinate XL 25 MG 24 hr tablet  Commonly known as:  TOPROL-XL   25 mg, Oral, Every Night at Bedtime      ondansetron ODT 8 MG disintegrating tablet  Commonly known as: ZOFRAN-ODT   Place 1 tablet on the tongue Daily As Needed.      oxyCODONE 10 MG tablet  Commonly known as: ROXICODONE   1 tablet, Every 4 to 6 Hours PRN      pain patient supplied pump   Continuous      PARoxetine 20 MG tablet  Commonly known as: PAXIL   Take 1 tablet by mouth Every Morning.      predniSONE 5 MG tablet  Commonly known as: DELTASONE   15 mg, Oral, 2 Times Daily, 15mg in am 15mg in pm               Allergies   Allergen Reactions    Amoxicillin Shortness Of Breath     Has tolerated Cefazolin, Ceftriaxone, and Cefepime -[facility], [facility]    Ceclor [Cefaclor] Shortness Of Breath     Has tolerated Cefazolin, Ceftriaxone, and Cefepime -[facility], [facility]      Penicillins Shortness Of Breath     Has tolerated Cefazolin, Ceftriaxone, and Cefepime -[facility], [facility]    Sulfa Antibiotics Rash    Valium [Diazepam] Mental Status Change     DEPRESSED    Ambien [Zolpidem] Mental Status Change    Aspirin GI Intolerance    Ativan [Lorazepam] Mental Status Change    Benadryl [Diphenhydramine] Anxiety     AND MAKES HER HYPER    Biaxin [Clarithromycin] Unknown - Low Severity    Cephalexin Unknown - Low Severity    Clindamycin Unknown - Low Severity    Compazine [Prochlorperazine Edisylate] Rash    Contrast Dye (Echo Or Unknown Ct/Mr) Other (See Comments)     Caused pain in her arm    Doxycycline Rash    Nsaids GI Intolerance    Phenergan [Promethazine Hcl] GI Intolerance    Promethazine GI Intolerance    Vancomycin Itching       Discharge Disposition:  Left Against Medical Advice    Diet:  Hospital:No active diet order      Discharge Activity:       CODE STATUS:  Code Status and Medical Interventions: CPR (Attempt to Resuscitate); Full Support   Ordered at: 06/10/25 0801     Code Status (Patient has no pulse and is not breathing):    CPR (Attempt to Resuscitate)     Medical Interventions  (Patient has pulse or is breathing):    Full Support     Level Of Support Discussed With:    Patient         Future Appointments   Date Time Provider Department Center   6/12/2025  2:45 PM RAKEL PET ADMIN RM 1  [facility] PET [facility]   6/12/2025  3:00 PM RAKEL PET 1  [facility] PET [facility]   7/22/2025  8:00 AM Kelin Rushing APRN [facility] ETW RAKEL           Pertinent  and/or Most Recent Results     PROCEDURES:   CT Soft Tissue Neck With Contrast  Result Date: 6/9/2025  CT SOFT TISSUE NECK W CONTRAST Date of Exam: 6/8/2025 3:59 PM EDT Indication: Lymphoma. Comparison: 5/6/2025 Technique: Axial CT images were obtained of the neck after the uneventful intravenous administration of iodinated contrast.  Reconstructed coronal and sagittal images were also obtained. Automated exposure control and iterative construction methods were used. Findings: Visualized intracranial contents appear within normal limits. Globes and orbits also appear within normal limits. Nasopharynx appears within normal limits. There is asymmetric thickening along the left wall of the oropharynx extending inferiorly into the left piriform sinus and left aryepiglottic fold. There is also some soft tissue thickening posteriorly at the level of the piriform sinuses. These findings  are similar to the prior exam. Findings are nonspecific and could be related to tumor or treatment related change. Visualized trachea and upper esophagus appear within normal limits. Thyroid gland is within normal limits. Submandibular glands and parotid glands appear within normal limits. Left level 1B lymph node is grossly unchanged measuring 9 x 7 mm in size. No other pathologically enlarged cervical lymph nodes. Left internal jugular Port-A-Cath remains in place and unchanged in position. There is emphysematous change of the lungs. Visualized lung apices are clear. There are degenerative changes of the cervical spine and right shoulder. No suspicious lytic or sclerotic bony lesion.      Impression: 1. Stable exam with soft tissue thickening involving the left side of the oropharynx and hypopharynx to the level of the area epiglottic folds, unchanged in appearance from prior study. 2. Stable borderline size 9 x 7 mm left level 1B lymph node. 3. Emphysema Electronically Signed: Poncho Macias MD  6/9/2025 9:40 AM EDT  Workstation ID: [id number]    CT Chest With Contrast Diagnostic  Result Date: 6/8/2025  CT CHEST W CONTRAST DIAGNOSTIC Date of Exam: 6/8/2025 3:59 PM EDT Indication: Lymphoma. Chest pain. Squamous cell lung cancer. Comparison: 3/29/2025 Technique: Axial CT images were obtained of the chest after the uneventful intravenous administration of iodinated contrast.  Reconstructed coronal and sagittal images were also obtained. Automated exposure control and iterative construction methods were  used. Findings: Left-sided Port-A-Cath tip is in the lower SVC. Patient is status post TAVR. Mitral valve annulus calcifications are present. No pleural or pericardial effusion is seen. There are calcified subcarinal and right hilar lymph nodes compatible with old granulomatous disease. There is new right internal mammary adenopathy measuring 1.3 cm. There are multiple small mediastinal lymph nodes in the upper mediastinum that may be slightly more prominent. Prior study is degraded by the lack of contrast. No definite hilar adenopathy is seen. Surgical clips are noted in the left axilla. There is left retropectoral adenopathy with one of the larger nodes measuring 1.2 cm. This appears new from prior. The axillae on both sides are partially excluded from the field-of-view. Comparison with the patient's more recent prior outside study would be beneficial. Upper abdominal images show probable splenomegaly (incompletely visualized.) Gastrostomy tube is in good position. Gallbladder is surgically absent. Lung windows again show pulmonary emphysema. There is a right lower lobe calcified granuloma. A  bilobed nodule in the right lower lobe has cavitated since the prior study. It now measures 1.8 x 1.3 cm. This lesion previously measured about 1.9 x 1.0 cm, but there is motion on the prior study. There are some small adjacent satellite nodules again identified. Previously described nodule in the medial right lower lobe is no longer visualized. There is some mild scarring or atelectasis in the right middle lobe. The left lung is clear. No evidence of pneumonia on either side of the chest. No pneumothorax. There is a new compression fracture at T6 with at least 80% loss of height centrally. No retropulsion. There is a new superior endplate fracture at T7 with at least 20% loss of height and no retropulsion. There is also a new superior endplate fracture at  T12 with 10 to 20% loss of height centrally and no retropulsion. A pain pump is present in the right flank.     1.There is new right internal mammary adenopathy. There is also new left retropectoral adenopathy. Comparison with the patient's more recent prior outside study would be beneficial. 2.New compression fractures at T6, T7 and T12 as described. No retropulsion. 3.Interval cavitation of the bilobed nodule in the right lower lobe since the prior study. There are some small adjacent satellite nodules. 4.Pulmonary emphysema. 5.Probable splenomegaly. 6.Additional findings as noted above. Electronically Signed: Des Carlson MD  6/8/2025 8:50 PM EDT  Workstation ID: [id number]    XR Chest 1 View  Result Date: 6/7/2025  XR CHEST 1 VW Date of Exam: 6/7/2025 9:15 PM EDT Indication: chest pain Comparison: 6/5/2025 Findings: Port-A-Cath tip remains in the SVC. Patient is status post TAVR. Heart size is normal. Mitral valve annulus calcifications are present. Emphysema is present. Lungs are clear except for granulomatous calcification at the right base. Pulmonary vascularity is normal. There is no pneumothorax.     Emphysema. No active disease. Electronically Signed:  Des Carlson MD  2025 9:24 PM EDT  Workstation ID: [id number]    XR Chest 1 View  Result Date: 2025  XR CHEST 1 VW Date of Exam: 2025 10:43 AM EDT Indication: Chest Pain Triage Protocol Comparison: Chest x-ray 2025 Findings: The heart is stable in size. There is a left chest wall port catheter with the distal tip overlying the distal superior vena cava. Aortic vascular stent is present. There is a calcified granuloma at the right lung base. No evidence for pneumothorax or pleural effusion. Surgical clips are noted in the left axillary region.     Impression: Stable chest examination without acute cardiopulmonary process. Electronically Signed: Mikayla Linares MD  2025 11:06 AM EDT  Workstation ID: [id number]    XR Chest 1 View  Result Date: 2025  XR CHEST 1 VW Date of Exam: 2025 9:53 PM EDT Indication: Cough, persistent sob cough Comparison: May 6, 2025 Findings: A left internal jugular port has its tip at the low SVC. The lungs are clear. The heart and mediastinal contours appear normal. There is pulmonary vascular congestion. The osseous structures appear intact.     Impression: 1.Pulmonary vascular congestion. 2.Lungs are clear. Electronically Signed: Ryan Farnsworth MD  2025 10:10 PM EDT  Workstation ID: [id number]    CT Chest, Abdomen, Pelvis With IV Contrast Without Oral Contrast  Result Date: 2025  REVIEWING YOUR TEST RESULTS IN [facility] IS NOT A SUBSTITUTE FOR DISCUSSING THOSE RESULTS WITH YOUR HEALTH CARE PROVIDER. PLEASE CONTACT YOUR PROVIDER VIA [facility] TO DISCUSS ANY QUESTIONS OR CONCERNS YOU MAY HAVE REGARDING THESE TEST RESULTS.  RADIOLOGY REPORT FACILITY:  [facility] UNIT/AGE/GENDER: M.CT  OP      AGE:59 Y          SEX:F PATIENT NAME/:  RENÉE ABARCA M    1965 UNIT NUMBER:  [id number] ACCOUNT NUMBER:  [id number] ACCESSION NUMBER:  [id number] EXAM: CT CHEST, CT ABDOMEN AND CT PELVIS W IV CON DATE:  05/16/2025 HISTORY:  staging. COMPARISON: 2/9/2025. Procedure:  Contiguous axial images through the chest, abdomen and pelvis were acquired after the intravenous administration of contrast.  Oral contrast was administered.  MPR images in the coronal plane were reviewed. CT scans at this facility use dose modulation, iterative reconstruction and/or weight based dosing when appropriate to reduce radiation dose to as low as reasonably achievable. CHEST FINDINGS: Left internal jugular approach port catheter tip terminates near the superior cavoatrial junction. Heart size is unchanged. The transcatheter aortic valve replacement changes. There is dense mitral annular calcification. No definite coronary arterial calcification. No pericardial effusion. The thyroid gland and esophagus are unremarkable. Calcified mediastinal lymph node is in keeping with healed granulomatous disease. There is increased left axillary lymphadenopathy. A left subpectoral hyper attenuating lymph node measures 1.4 cm (series 2 image 31). Centrally hypoattenuating rim-enhancing structure in the left axilla adjacent to multiple surgical clips measures 3.3 cm (series 2 image 31). There is a background of moderate centrilobular pulmonary emphysema. Previously seen diffuse mucous plugging and nodularity is substantially improved. Clustered cavitary nodules in the anterior right lower lobe are slightly increased now measuring up to 1.1 cm in diameter, previously 0.8 cm (series 3 image 114). No acute osseous abnormality. No suspicious osseous lesion. IMPRESSION: 1.Left axillary and subpectoral lymphadenopathy is new compared to prior examination with hyperattenuating left subpectoral lymph node measuring up to 1.4 cm short axis. 2.Postsurgical changes of the left axilla with 3.3 cm short axis rim-enhancing centrally hypoattenuating lesion adjacent to surgical clips. This may represent a centrally necrotic lymph node or potentially postsurgical hematoma  or abscess. Continued attention on follow-up examinations is recommended. 3.Significant interval improvement in previously seen diffuse mucous plugging and centrilobular nodularity favoring improving diffuse bronchiolitis. Cavitary nodules in the right lower lobe are likely infectious in nature and are slightly increased in size compared to prior examination. This would be an atypical appearance for lymphoma but continued attention on follow-up is recommended. ABDOMEN AND PELVIS FINDINGS: The gallbladder is surgically absent. No biliary ductal dilation. Focal fat is noted adjacent to the hepatic falciform ligament. Mild hepatomegaly noted. The spleen is enlarged measuring up to 14 cm craniocaudal. The pancreas and adrenal glands are within normal limits. The kidneys enhance symmetrically. There are bilateral small nonobstructing renal calculi. No hydroureteronephrosis. Urinary bladder is unremarkable. Uterus is surgically absent. No adnexal mass lesions. The colon is decompressed. Apparent colonic wall thickening is favored to be secondary to under distention. No significant pericolic fat stranding. No appendiceal inflammation. Small bowel loops are normal caliber. Gastrostomy tube terminates in the gastric body. Stomach is not dilated. Duodenum is normal. The portal, splenic and superior mesenteric veins are patent. The abdominal aorta contains calcified plaque without aneurysm. 1.4 cm peripancreatic lymph node is not clearly seen on prior examination (series 5 image 41). A portal caval lymph node measures 1 cm, previously 2 mm (series 5 image 36). Additional subcentimeter retroperitoneal lymph nodes now measure up to 8 mm, previously 3 mm (series 5 image 65). 8 mm left external iliac node previously measured 3 years (series 5 image 87). Baclofen pump is noted in the right back soft tissues. Catheter enters the lumbar thecal sac and ascends cranially. A bandage leads related to left abdominal wall stimulator device  are seen extending into the lumbar spinal canal and ascending cranially. There is severe bilateral hip joint osteoarthritis. There is moderate lower lumbar facet osteoarthritis. There is moderate to severe lower lumbar spondylosis. IMPRESSION: 1.  New perihepatic lymphadenopathy and interval increase in the size numerous subcentimeter retroperitoneal and pelvic lymph nodes suspicious for lymphomatous involvement. 2. Mild splenomegaly measuring 14 cm craniocaudal. Dictated by: Nicko Burgos M.D. Images and Report reviewed and interpreted by: Nicko Burgos M.D. <PS><Electronically signed by: Nicko Burgos M.D.> 05/16/2025 1335 D: 05/16/2025 1316 T: 05/16/2025 1316    CT Head Without Contrast  Result Date: 5/15/2025  CT HEAD WO CONTRAST Date of Exam: 5/15/2025 3:26 PM EDT Indication: Fall from wheelchair?head injury. Comparison: CT brain dated 5/4/2025 Technique: Axial CT images were obtained of the head without contrast administration.  Reconstructed coronal and sagittal images were also obtained. Automated exposure control and iterative construction methods were used. Findings: There is no evidence of hemorrhage. There is no mass effect or midline shift. There is no extracerebral collection. Ventricles are normal in size and configuration for patient's stated age. Posterior fossa is within normal limits. Calvarium and skull base appear intact.  Visualized sinuses show no air fluid levels. Visualized orbits are unremarkable. Left parietal scalp hematoma     Impression: 1.No acute intracranial abnormality identified. Electronically Signed: Drake Zhao MD  5/15/2025 3:42 PM EDT  Workstation ID: [id number]        LAB RESULTS:      Lab 06/10/25  1021 06/08/25  0510 06/07/25  0458 06/06/25  0435 06/05/25  1421 06/05/25  1402 06/05/25  1251 06/05/25  1106   WBC 6.42 6.76 7.94 9.37  --   --   --  7.91   HEMOGLOBIN 12.9 13.3 12.7 12.6  --   --   --  12.2   HEMATOCRIT 42.2 43.0 42.0 42.5  --   --   --  41.2   PLATELETS 173 152  156 180  --   --   --  150   NEUTROS ABS 5.42 5.79 6.92 8.20*  --   --   --  6.67   IMMATURE GRANS (ABS) 0.03 0.05 0.06* 0.10*  --   --   --  0.11*   LYMPHS ABS 0.42* 0.40* 0.51* 0.63*  --   --   --  0.60*   MONOS ABS 0.51 0.50 0.42 0.41  --   --   --  0.48   EOS ABS 0.02 0.02 0.01 0.00  --   --   --  0.03   MCV 81.3 81.6 82.4 84.0  --   --   --  85.7   CRP  --   --   --   --   --   --  5.95*  --    PROCALCITONIN  --   --   --  0.09  --   --  0.12  --    LACTATE  --   --   --   --  0.8 0.9  --   --          Lab 06/10/25  [facility] 06/08/25 0510 06/07/25 0458 06/06/25 0435 06/05/25  1106   SODIUM 131* 131* 131* 137 136   POTASSIUM 4.1 4.0 3.7 4.1 3.7   CHLORIDE 94* 94* 95* 98 97*   CO2 27.0 24.3 23.2 28.5 30.6*   ANION GAP 10.0 12.7 12.8 10.5 8.4   BUN 11.5 6.8 7.3 11.9 15.1   CREATININE 0.43* 0.37* 0.30* 0.32* 0.36*   EGFR 112.2 116.3 122.4 120.5 117.1   GLUCOSE 239* 116* 178* 145* 357*   CALCIUM 8.5* 8.3* 8.2* 8.4* 8.2*   MAGNESIUM 1.7 1.7 1.5* 1.7 1.5*   PHOSPHORUS 2.7 2.4* 1.3*  --   --          Lab 06/10/25  102 06/08/25 0510 06/07/25 0458 06/06/25 0435 06/05/25  1106   TOTAL PROTEIN 6.1 5.7* 5.7* 5.9* 6.0   ALBUMIN 2.9* 3.1* 3.0* 3.0* 3.2*   GLOBULIN  --   --   --  2.9 2.8   ALT (SGPT) 13 10 10 12 9   AST (SGOT) 12 12 13 13 10   BILIRUBIN 0.6 0.6 0.5 0.3 0.3   INDIRECT BILIRUBIN 0.4 0.4 0.3  --   --    BILIRUBIN DIRECT 0.2 0.2 0.2  --   --    ALK PHOS 106 107 119* 129* 129*   LIPASE  --   --   --   --  66*         Lab 06/08/25 2016 06/07/25  2258 06/07/25  2126 06/05/25  1251 06/05/25  1106   PROBNP  --   --   --   --  488.5   HSTROP T 17* 20* 20* 29* 30*                 Lab 06/06/25  0707 06/05/25  1421   PH, ARTERIAL 7.395 7.320*   PCO2, ARTERIAL 53.9* 71.5*   PO2 .0* 50.6*   O2 SATURATION ART 97.7 80.4*   FIO2 28 32   HCO3 ART 33.0* 36.8*   BASE EXCESS ART 6.7* 7.6*     Brief Urine Lab Results  (Last result in the past 365 days)        Color   Clarity   Blood   Leuk Est   Nitrite   Protein   CREAT    Urine HCG        06/05/25 1343 Dark Yellow   Clear   Negative   Negative   Negative   30 mg/dL (1+)                 Microbiology Results (last 10 days)       Procedure Component Value - Date/Time    Clostridioides difficile Toxin - Stool, Per Rectum [[id number]] Collected: 06/06/25 1205    Lab Status: Final result Specimen: Stool from Per Rectum Updated: 06/06/25 1250    Narrative:      The following orders were created for panel order Clostridioides difficile Toxin - Stool, Per Rectum.  Procedure                               Abnormality         Status                     ---------                               -----------         ------                     Clostridioides difficile...[[id number]]                      Final result                 Please view results for these tests on the individual orders.    Clostridioides difficile Toxin, PCR - Stool, Per Rectum [[id number]] Collected: 06/06/25 1205    Lab Status: Final result Specimen: Stool from Per Rectum Updated: 06/06/25 1250     Toxigenic C. difficile by PCR Negative     027 Toxin Presumptive Negative    Narrative:      The result indicates the absence of toxigenic C. difficile from stool specimen.     Blood Culture - Blood, Arm, Right [[id number]]  (Normal) Collected: 06/05/25 1402    Lab Status: Final result Specimen: Blood from Arm, Right Updated: 06/10/25 1415     Blood Culture No growth at 5 days    Blood Culture - Blood, Arm, Left [[id number]]  (Normal) Collected: 06/05/25 1402    Lab Status: Final result Specimen: Blood from Arm, Left Updated: 06/10/25 1415     Blood Culture No growth at 5 days    S. Pneumo Ag Urine or CSF - Urine, Straight Cath [[id number]]  (Normal) Collected: 06/05/25 1343    Lab Status: Final result Specimen: Urine from Straight Cath Updated: 06/05/25 1558     Strep Pneumo Ag Negative    Legionella Antigen, Urine - Urine, Straight Cath [[id number]]  (Normal) Collected: 06/05/25 1343    Lab Status: Edited Result - FINAL Specimen: Urine  from Straight Cath Updated: 06/05/25 1626     LEGIONELLA ANTIGEN, URINE Negative    Mycoplasma Pneumoniae Antibody, IgM - Blood, [[id number]]  (Normal) Collected: 06/05/25 1106    Lab Status: Final result Specimen: Blood Updated: 06/05/25 1619     Mycoplasma pneumo IgM Negative            CT Soft Tissue Neck With Contrast  Result Date: 6/9/2025  Impression: Impression: 1. Stable exam with soft tissue thickening involving the left side of the oropharynx and hypopharynx to the level of the area epiglottic folds, unchanged in appearance from prior study. 2. Stable borderline size 9 x 7 mm left level 1B lymph node. 3. Emphysema Electronically Signed: Poncho Macias MD  6/9/2025 9:40 AM EDT  Workstation ID: [id number]    CT Chest With Contrast Diagnostic  Result Date: 6/8/2025  Impression: 1.There is new right internal mammary adenopathy. There is also new left retropectoral adenopathy. Comparison with the patient's more recent prior outside study would be beneficial. 2.New compression fractures at T6, T7 and T12 as described. No retropulsion. 3.Interval cavitation of the bilobed nodule in the right lower lobe since the prior study. There are some small adjacent satellite nodules. 4.Pulmonary emphysema. 5.Probable splenomegaly. 6.Additional findings as noted above. Electronically Signed: Des Carlson MD  6/8/2025 8:50 PM EDT  Workstation ID: [id number]    XR Chest 1 View  Result Date: 6/7/2025  Impression: Emphysema. No active disease. Electronically Signed: eDs Carlson MD  6/7/2025 9:24 PM EDT  Workstation ID: [id number]    XR Chest 1 View  Result Date: 6/5/2025  Impression: Impression: Stable chest examination without acute cardiopulmonary process. Electronically Signed: Mikayla Linares MD  6/5/2025 11:06 AM EDT  Workstation ID: [id number]    XR Chest 1 View  Result Date: 5/27/2025  Impression: Impression: 1.Pulmonary vascular congestion. 2.Lungs are clear. Electronically Signed: Ryan Farnsworth MD  5/27/2025 10:10 PM  EDT  Workstation ID: [id number]    CT Head Without Contrast  Result Date: 5/15/2025  Impression: Impression: 1.No acute intracranial abnormality identified. Electronically Signed: Drake Zhao MD  5/15/2025 3:42 PM EDT  Workstation ID: [id number]      Results for orders placed during the hospital encounter of 02/04/25    Doppler Arterial Single Level Upper Extremity - Bilateral CAR    Interpretation Summary    Normal arterial pressures on the right. Normal digital pressures noted on the right.    Normal arterial pressures on the left. Normal digital pressures noted on the left.    Normal arterial evaluation of both upper extremities.      Results for orders placed during the hospital encounter of 02/04/25    Doppler Arterial Single Level Upper Extremity - Bilateral CAR    Interpretation Summary    Normal arterial pressures on the right. Normal digital pressures noted on the right.    Normal arterial pressures on the left. Normal digital pressures noted on the left.    Normal arterial evaluation of both upper extremities.      Results for orders placed during the hospital encounter of 03/28/25    Adult Transthoracic Echo Limited W/ Cont if Necessary Per Protocol    Interpretation Summary    Left ventricular ejection fraction appears to be 61 - 65%.    Left ventricular wall thickness is consistent with mild to moderate septal asymmetric hypertrophy.    Left ventricular diastolic function is consistent with (grade I) impaired relaxation.    The left atrial cavity is mildly dilated.    There is a prosthetic aortic valve of unknown type present.    Estimated right ventricular systolic pressure from tricuspid regurgitation is normal (<35 mmHg).    There is a trivial pericardial effusion.      Labs Pending at Discharge:      Electronically signed by Karen Griffith MD, 06/11/25, 3:38 PM EDT.    Portions of this documentation were transcribed electronically from a voice recognition software.  I confirm all data accurately  represents the service(s) I performed at today's visit.

## 2025-06-11 NOTE — NURSING NOTE
"Patient upset with pain regimen, complaining of 10/10 pain. PRN Tylenol given once per MAR, one-time dose of toradol ordered for pain. Reached out to on call MD again for pain medication upon patient request, one-time dose of morphine ordered, patient refused and then states they are leaving AMA \"can get pain medication at home\", education was provided and risks/benefits were noted, patient stated \"this is BS, I am leaving, the doctors have nothing to do with me leaving\", AMA paperwork filled and signed. Patient trying to crawl out of building, safety education provided, wheelchair given, patient daughter picking up.   "

## 2025-06-11 NOTE — NURSING NOTE
"Patient waiting for ride to arrive to leave AMA, states \"this is bull shit I am leaving now\", pt then proceeds to walk in the wheelchair towards the elevators. Education provided on policy about leaving AMA and patient then decides to get out of the wheelchair and try to crawl with belongings, states \"I will just crawl my way out then.\" Charge RN and I stated how this is a safety risk. Patient insisted on being wheeled out front to wait for ride, risks of doing so provided, pt refused to wait, wheeled outside to wait for ride, AMA forms signed and in chart.   "

## 2025-06-12 ENCOUNTER — HOSPITAL ENCOUNTER (EMERGENCY)
Dept: PET IMAGING | Facility: HOSPITAL | Age: 60
Discharge: HOME | DRG: 189 | End: 2025-06-12
Payer: MEDICAID

## 2025-06-12 DIAGNOSIS — C81.90 HODGKIN LYMPHOMA, UNSPECIFIED HODGKIN LYMPHOMA TYPE, UNSPECIFIED BODY REGION: ICD-10-CM

## 2025-06-12 PROCEDURE — 78815 PET IMAGE W/CT SKULL-THIGH: CPT

## 2025-06-12 PROCEDURE — 34310000005 FLUDEOXYGLUCOSE F18 SOLUTION: Performed by: INTERNAL MEDICINE

## 2025-06-12 PROCEDURE — A9552 F18 FDG: HCPCS | Performed by: INTERNAL MEDICINE

## 2025-06-12 RX ADMIN — FLUDEOXYGLUCOSE F 18 1 DOSE: 200 INJECTION, SOLUTION INTRAVENOUS at 11:51

## 2025-06-24 ENCOUNTER — APPOINTMENT (OUTPATIENT)
Dept: GENERAL RADIOLOGY | Facility: HOSPITAL | Age: 60
End: 2025-06-24
Payer: MEDICAID

## 2025-06-24 ENCOUNTER — HOSPITAL ENCOUNTER (INPATIENT)
Facility: HOSPITAL | Age: 60
LOS: 3 days | Discharge: LEFT BEFORE DISCHARGE | End: 2025-06-27
Attending: EMERGENCY MEDICINE | Admitting: INTERNAL MEDICINE
Payer: MEDICAID

## 2025-06-24 DIAGNOSIS — A41.9 SEPSIS, DUE TO UNSPECIFIED ORGANISM, UNSPECIFIED WHETHER ACUTE ORGAN DYSFUNCTION PRESENT: Primary | ICD-10-CM

## 2025-06-24 DIAGNOSIS — R26.2 DIFFICULTY WALKING: ICD-10-CM

## 2025-06-24 DIAGNOSIS — L03.116 LEFT LEG CELLULITIS: ICD-10-CM

## 2025-06-24 DIAGNOSIS — Z78.9 DECREASED ACTIVITIES OF DAILY LIVING (ADL): ICD-10-CM

## 2025-06-24 PROBLEM — L03.90 CELLULITIS: Status: ACTIVE | Noted: 2025-06-24

## 2025-06-24 LAB
ALBUMIN SERPL-MCNC: 2.8 G/DL (ref 3.5–5.2)
ALBUMIN/GLOB SERPL: 0.8 G/DL
ALP SERPL-CCNC: 146 U/L (ref 39–117)
ALT SERPL W P-5'-P-CCNC: 9 U/L (ref 1–33)
ANION GAP SERPL CALCULATED.3IONS-SCNC: 13.7 MMOL/L (ref 5–15)
AST SERPL-CCNC: 16 U/L (ref 1–32)
ATMOSPHERIC PRESS: 748.5 MMHG
BACTERIA UR QL AUTO: ABNORMAL /HPF
BASE EXCESS BLDV CALC-SCNC: 6.8 MMOL/L (ref -2–2)
BASOPHILS # BLD AUTO: 0.03 10*3/MM3 (ref 0–0.2)
BASOPHILS NFR BLD AUTO: 0.2 % (ref 0–1.5)
BILIRUB SERPL-MCNC: 0.4 MG/DL (ref 0–1.2)
BILIRUB UR QL STRIP: NEGATIVE
BUN SERPL-MCNC: 9.2 MG/DL (ref 6–20)
BUN/CREAT SERPL: 20.9 (ref 7–25)
CA-I BLDA-SCNC: 1.14 MMOL/L (ref 1.13–1.32)
CALCIUM SPEC-SCNC: 8.9 MG/DL (ref 8.6–10.5)
CHLORIDE BLDA-SCNC: 94 MMOL/L (ref 98–107)
CHLORIDE SERPL-SCNC: 92 MMOL/L (ref 98–107)
CLARITY UR: ABNORMAL
CO2 SERPL-SCNC: 25.3 MMOL/L (ref 22–29)
COLOR UR: ABNORMAL
CREAT SERPL-MCNC: 0.44 MG/DL (ref 0.57–1)
D-LACTATE SERPL-SCNC: 0.9 MMOL/L (ref 0.5–2)
D-LACTATE SERPL-SCNC: 2.5 MMOL/L
D-LACTATE SERPL-SCNC: 2.8 MMOL/L (ref 0.5–2)
DEPRECATED RDW RBC AUTO: 47.3 FL (ref 37–54)
EGFRCR SERPLBLD CKD-EPI 2021: 111.6 ML/MIN/1.73
EOSINOPHIL # BLD AUTO: 0 10*3/MM3 (ref 0–0.4)
EOSINOPHIL NFR BLD AUTO: 0 % (ref 0.3–6.2)
ERYTHROCYTE [DISTWIDTH] IN BLOOD BY AUTOMATED COUNT: 15.4 % (ref 12.3–15.4)
FLUAV RNA RESP QL NAA+PROBE: NOT DETECTED
FLUBV RNA RESP QL NAA+PROBE: NOT DETECTED
GAS FLOW AIRWAY: 3 LPM
GEN 5 1HR TROPONIN T REFLEX: 83 NG/L
GLOBULIN UR ELPH-MCNC: 3.6 GM/DL
GLUCOSE BLDC GLUCOMTR-MCNC: 231 MG/DL (ref 70–99)
GLUCOSE BLDC GLUCOMTR-MCNC: 264 MG/DL (ref 65–99)
GLUCOSE SERPL-MCNC: 267 MG/DL (ref 65–99)
GLUCOSE UR STRIP-MCNC: ABNORMAL MG/DL
GRAN CASTS URNS QL MICRO: ABNORMAL /LPF
HCO3 BLDV-SCNC: 32.6 MMOL/L (ref 22–26)
HCT VFR BLD AUTO: 40.4 % (ref 34–46.6)
HGB BLD-MCNC: 12.1 G/DL (ref 12–15.9)
HGB BLDA-MCNC: 15.8 G/DL (ref 12–18)
HGB UR QL STRIP.AUTO: NEGATIVE
HOLD SPECIMEN: NORMAL
HOLD SPECIMEN: NORMAL
HYALINE CASTS UR QL AUTO: ABNORMAL /LPF
IMM GRANULOCYTES # BLD AUTO: 0.19 10*3/MM3 (ref 0–0.05)
IMM GRANULOCYTES NFR BLD AUTO: 1.1 % (ref 0–0.5)
KETONES UR QL STRIP: ABNORMAL
LEUKOCYTE ESTERASE UR QL STRIP.AUTO: ABNORMAL
LYMPHOCYTES # BLD AUTO: 0.62 10*3/MM3 (ref 0.7–3.1)
LYMPHOCYTES NFR BLD AUTO: 3.6 % (ref 19.6–45.3)
MAGNESIUM SERPL-MCNC: 1.3 MG/DL (ref 1.6–2.6)
MCH RBC QN AUTO: 24.8 PG (ref 26.6–33)
MCHC RBC AUTO-ENTMCNC: 30 G/DL (ref 31.5–35.7)
MCV RBC AUTO: 82.8 FL (ref 79–97)
MODALITY: ABNORMAL
MONOCYTES # BLD AUTO: 0.56 10*3/MM3 (ref 0.1–0.9)
MONOCYTES NFR BLD AUTO: 3.2 % (ref 5–12)
MUCOUS THREADS URNS QL MICRO: ABNORMAL /HPF
NEUTROPHILS NFR BLD AUTO: 16.02 10*3/MM3 (ref 1.7–7)
NEUTROPHILS NFR BLD AUTO: 91.9 % (ref 42.7–76)
NITRITE UR QL STRIP: NEGATIVE
NOTIFIED WHO: ABNORMAL
NRBC BLD AUTO-RTO: 0 /100 WBC (ref 0–0.2)
PCO2 BLDV: 49.3 MM HG (ref 41–51)
PH BLDV: 7.43 PH UNITS (ref 7.31–7.41)
PH UR STRIP.AUTO: 7 [PH] (ref 5–8)
PLATELET # BLD AUTO: 251 10*3/MM3 (ref 140–450)
PMV BLD AUTO: 10.9 FL (ref 6–12)
PO2 BLDV: 35.8 MM HG (ref 35–42)
POTASSIUM BLDA-SCNC: 4.1 MMOL/L (ref 3.5–5)
POTASSIUM SERPL-SCNC: 4.4 MMOL/L (ref 3.5–5.2)
PROT SERPL-MCNC: 6.4 G/DL (ref 6–8.5)
PROT UR QL STRIP: ABNORMAL
QT INTERVAL: 350 MS
QTC INTERVAL: 568 MS
RBC # BLD AUTO: 4.88 10*6/MM3 (ref 3.77–5.28)
RBC # UR STRIP: ABNORMAL /HPF
REF LAB TEST METHOD: ABNORMAL
RSV RNA RESP QL NAA+PROBE: NOT DETECTED
SAO2 % BLDCOV: 69.3 % (ref 45–75)
SARS-COV-2 RNA RESP QL NAA+PROBE: NOT DETECTED
SODIUM BLD-SCNC: 133 MMOL/L (ref 131–143)
SODIUM SERPL-SCNC: 131 MMOL/L (ref 136–145)
SP GR UR STRIP: 1.02 (ref 1–1.03)
SQUAMOUS #/AREA URNS HPF: ABNORMAL /HPF
TROPONIN T % DELTA: 89
TROPONIN T NUMERIC DELTA: 39 NG/L
TROPONIN T SERPL HS-MCNC: 44 NG/L
TSH SERPL DL<=0.05 MIU/L-ACNC: 1.87 UIU/ML (ref 0.27–4.2)
UROBILINOGEN UR QL STRIP: ABNORMAL
WBC # UR STRIP: ABNORMAL /HPF
WBC NRBC COR # BLD AUTO: 17.42 10*3/MM3 (ref 3.4–10.8)
WHOLE BLOOD HOLD COAG: NORMAL
WHOLE BLOOD HOLD SPECIMEN: NORMAL

## 2025-06-24 PROCEDURE — 80053 COMPREHEN METABOLIC PANEL: CPT | Performed by: EMERGENCY MEDICINE

## 2025-06-24 PROCEDURE — 85025 COMPLETE CBC W/AUTO DIFF WBC: CPT | Performed by: EMERGENCY MEDICINE

## 2025-06-24 PROCEDURE — 93005 ELECTROCARDIOGRAM TRACING: CPT | Performed by: EMERGENCY MEDICINE

## 2025-06-24 PROCEDURE — 94799 UNLISTED PULMONARY SVC/PX: CPT

## 2025-06-24 PROCEDURE — 84484 ASSAY OF TROPONIN QUANT: CPT | Performed by: EMERGENCY MEDICINE

## 2025-06-24 PROCEDURE — 99223 1ST HOSP IP/OBS HIGH 75: CPT | Performed by: INTERNAL MEDICINE

## 2025-06-24 PROCEDURE — 25010000002 CEFEPIME PER 500 MG: Performed by: EMERGENCY MEDICINE

## 2025-06-24 PROCEDURE — 94640 AIRWAY INHALATION TREATMENT: CPT

## 2025-06-24 PROCEDURE — 80051 ELECTROLYTE PANEL: CPT

## 2025-06-24 PROCEDURE — 25010000002 CEFEPIME PER 500 MG: Performed by: INTERNAL MEDICINE

## 2025-06-24 PROCEDURE — 93010 ELECTROCARDIOGRAM REPORT: CPT | Performed by: INTERNAL MEDICINE

## 2025-06-24 PROCEDURE — 82330 ASSAY OF CALCIUM: CPT

## 2025-06-24 PROCEDURE — 25810000003 SODIUM CHLORIDE 0.9 % SOLUTION: Performed by: EMERGENCY MEDICINE

## 2025-06-24 PROCEDURE — 94664 DEMO&/EVAL PT USE INHALER: CPT

## 2025-06-24 PROCEDURE — 87186 SC STD MICRODIL/AGAR DIL: CPT | Performed by: EMERGENCY MEDICINE

## 2025-06-24 PROCEDURE — 82803 BLOOD GASES ANY COMBINATION: CPT

## 2025-06-24 PROCEDURE — 82948 REAGENT STRIP/BLOOD GLUCOSE: CPT

## 2025-06-24 PROCEDURE — 83605 ASSAY OF LACTIC ACID: CPT | Performed by: EMERGENCY MEDICINE

## 2025-06-24 PROCEDURE — 81001 URINALYSIS AUTO W/SCOPE: CPT | Performed by: EMERGENCY MEDICINE

## 2025-06-24 PROCEDURE — 87040 BLOOD CULTURE FOR BACTERIA: CPT | Performed by: EMERGENCY MEDICINE

## 2025-06-24 PROCEDURE — 83605 ASSAY OF LACTIC ACID: CPT

## 2025-06-24 PROCEDURE — 63710000001 INSULIN LISPRO (HUMAN) PER 5 UNITS: Performed by: INTERNAL MEDICINE

## 2025-06-24 PROCEDURE — 87205 SMEAR GRAM STAIN: CPT | Performed by: EMERGENCY MEDICINE

## 2025-06-24 PROCEDURE — 84443 ASSAY THYROID STIM HORMONE: CPT | Performed by: EMERGENCY MEDICINE

## 2025-06-24 PROCEDURE — 87070 CULTURE OTHR SPECIMN AEROBIC: CPT | Performed by: EMERGENCY MEDICINE

## 2025-06-24 PROCEDURE — 25010000002 ONDANSETRON PER 1 MG: Performed by: EMERGENCY MEDICINE

## 2025-06-24 PROCEDURE — 87077 CULTURE AEROBIC IDENTIFY: CPT | Performed by: EMERGENCY MEDICINE

## 2025-06-24 PROCEDURE — 87637 SARSCOV2&INF A&B&RSV AMP PRB: CPT | Performed by: EMERGENCY MEDICINE

## 2025-06-24 PROCEDURE — 94761 N-INVAS EAR/PLS OXIMETRY MLT: CPT

## 2025-06-24 PROCEDURE — 83735 ASSAY OF MAGNESIUM: CPT | Performed by: EMERGENCY MEDICINE

## 2025-06-24 PROCEDURE — 25010000002 HYDROMORPHONE 1 MG/ML SOLUTION: Performed by: EMERGENCY MEDICINE

## 2025-06-24 PROCEDURE — 36415 COLL VENOUS BLD VENIPUNCTURE: CPT

## 2025-06-24 PROCEDURE — 71045 X-RAY EXAM CHEST 1 VIEW: CPT

## 2025-06-24 PROCEDURE — P9612 CATHETERIZE FOR URINE SPEC: HCPCS

## 2025-06-24 PROCEDURE — 99291 CRITICAL CARE FIRST HOUR: CPT

## 2025-06-24 PROCEDURE — 25010000002 HEPARIN (PORCINE) PER 1000 UNITS: Performed by: INTERNAL MEDICINE

## 2025-06-24 PROCEDURE — 25810000003 LACTATED RINGERS PER 1000 ML: Performed by: INTERNAL MEDICINE

## 2025-06-24 RX ORDER — PAROXETINE 20 MG/1
20 TABLET, FILM COATED ORAL EVERY MORNING
Status: DISCONTINUED | OUTPATIENT
Start: 2025-06-25 | End: 2025-06-27 | Stop reason: HOSPADM

## 2025-06-24 RX ORDER — BISACODYL 5 MG/1
5 TABLET, DELAYED RELEASE ORAL DAILY PRN
Status: DISCONTINUED | OUTPATIENT
Start: 2025-06-24 | End: 2025-06-27 | Stop reason: HOSPADM

## 2025-06-24 RX ORDER — SODIUM CHLORIDE 0.9 % (FLUSH) 0.9 %
10 SYRINGE (ML) INJECTION EVERY 12 HOURS SCHEDULED
Status: DISCONTINUED | OUTPATIENT
Start: 2025-06-24 | End: 2025-06-27 | Stop reason: HOSPADM

## 2025-06-24 RX ORDER — HYDROCODONE BITARTRATE AND ACETAMINOPHEN 10; 325 MG/1; MG/1
1 TABLET ORAL EVERY 4 HOURS PRN
Refills: 0 | Status: DISCONTINUED | OUTPATIENT
Start: 2025-06-24 | End: 2025-06-27 | Stop reason: HOSPADM

## 2025-06-24 RX ORDER — DEXTROSE MONOHYDRATE 25 G/50ML
25 INJECTION, SOLUTION INTRAVENOUS
Status: DISCONTINUED | OUTPATIENT
Start: 2025-06-24 | End: 2025-06-27 | Stop reason: HOSPADM

## 2025-06-24 RX ORDER — IBUPROFEN 600 MG/1
1 TABLET ORAL
Status: DISCONTINUED | OUTPATIENT
Start: 2025-06-24 | End: 2025-06-27 | Stop reason: HOSPADM

## 2025-06-24 RX ORDER — SODIUM CHLORIDE 9 MG/ML
40 INJECTION, SOLUTION INTRAVENOUS AS NEEDED
Status: DISCONTINUED | OUTPATIENT
Start: 2025-06-24 | End: 2025-06-27 | Stop reason: HOSPADM

## 2025-06-24 RX ORDER — MULTIPLE VITAMINS W/ MINERALS TAB 9MG-400MCG
1 TAB ORAL DAILY
Status: DISCONTINUED | OUTPATIENT
Start: 2025-06-24 | End: 2025-06-27 | Stop reason: HOSPADM

## 2025-06-24 RX ORDER — BUDESONIDE 0.5 MG/2ML
0.5 INHALANT ORAL
Status: DISCONTINUED | OUTPATIENT
Start: 2025-06-24 | End: 2025-06-27 | Stop reason: HOSPADM

## 2025-06-24 RX ORDER — ONDANSETRON 2 MG/ML
4 INJECTION INTRAMUSCULAR; INTRAVENOUS ONCE
Status: COMPLETED | OUTPATIENT
Start: 2025-06-24 | End: 2025-06-24

## 2025-06-24 RX ORDER — HYDROCODONE BITARTRATE AND ACETAMINOPHEN 10; 325 MG/1; MG/1
1 TABLET ORAL EVERY 4 HOURS PRN
COMMUNITY
Start: 2025-06-17

## 2025-06-24 RX ORDER — ARFORMOTEROL TARTRATE 15 UG/2ML
15 SOLUTION RESPIRATORY (INHALATION)
Status: DISCONTINUED | OUTPATIENT
Start: 2025-06-24 | End: 2025-06-27 | Stop reason: HOSPADM

## 2025-06-24 RX ORDER — BUDESONIDE 0.5 MG/2ML
0.5 INHALANT ORAL
COMMUNITY

## 2025-06-24 RX ORDER — NYSTATIN 100000 [USP'U]/ML
4 SUSPENSION ORAL 4 TIMES DAILY
COMMUNITY
Start: 2025-06-11

## 2025-06-24 RX ORDER — AMOXICILLIN 250 MG
2 CAPSULE ORAL 2 TIMES DAILY PRN
Status: DISCONTINUED | OUTPATIENT
Start: 2025-06-24 | End: 2025-06-27 | Stop reason: HOSPADM

## 2025-06-24 RX ORDER — ALPRAZOLAM 0.25 MG
0.25 TABLET ORAL 2 TIMES DAILY
Status: DISCONTINUED | OUTPATIENT
Start: 2025-06-24 | End: 2025-06-27 | Stop reason: HOSPADM

## 2025-06-24 RX ORDER — NICOTINE POLACRILEX 4 MG
15 LOZENGE BUCCAL
Status: DISCONTINUED | OUTPATIENT
Start: 2025-06-24 | End: 2025-06-27 | Stop reason: HOSPADM

## 2025-06-24 RX ORDER — ONDANSETRON 2 MG/ML
4 INJECTION INTRAMUSCULAR; INTRAVENOUS EVERY 6 HOURS PRN
Status: DISCONTINUED | OUTPATIENT
Start: 2025-06-24 | End: 2025-06-27 | Stop reason: HOSPADM

## 2025-06-24 RX ORDER — SODIUM CHLORIDE 0.9 % (FLUSH) 0.9 %
10 SYRINGE (ML) INJECTION AS NEEDED
Status: DISCONTINUED | OUTPATIENT
Start: 2025-06-24 | End: 2025-06-27 | Stop reason: HOSPADM

## 2025-06-24 RX ORDER — BISACODYL 10 MG
10 SUPPOSITORY, RECTAL RECTAL DAILY PRN
Status: DISCONTINUED | OUTPATIENT
Start: 2025-06-24 | End: 2025-06-27 | Stop reason: HOSPADM

## 2025-06-24 RX ORDER — FAMOTIDINE 20 MG/1
40 TABLET, FILM COATED ORAL DAILY
Status: DISCONTINUED | OUTPATIENT
Start: 2025-06-24 | End: 2025-06-26

## 2025-06-24 RX ORDER — HEPARIN SODIUM 5000 [USP'U]/ML
5000 INJECTION, SOLUTION INTRAVENOUS; SUBCUTANEOUS EVERY 8 HOURS SCHEDULED
Status: DISCONTINUED | OUTPATIENT
Start: 2025-06-24 | End: 2025-06-27 | Stop reason: HOSPADM

## 2025-06-24 RX ORDER — SODIUM CHLORIDE, SODIUM LACTATE, POTASSIUM CHLORIDE, CALCIUM CHLORIDE 600; 310; 30; 20 MG/100ML; MG/100ML; MG/100ML; MG/100ML
125 INJECTION, SOLUTION INTRAVENOUS CONTINUOUS
Status: ACTIVE | OUTPATIENT
Start: 2025-06-24 | End: 2025-06-25

## 2025-06-24 RX ORDER — NITROGLYCERIN 0.4 MG/1
0.4 TABLET SUBLINGUAL
Status: DISCONTINUED | OUTPATIENT
Start: 2025-06-24 | End: 2025-06-27 | Stop reason: HOSPADM

## 2025-06-24 RX ORDER — INSULIN LISPRO 100 [IU]/ML
2-7 INJECTION, SOLUTION INTRAVENOUS; SUBCUTANEOUS
Status: DISCONTINUED | OUTPATIENT
Start: 2025-06-24 | End: 2025-06-27 | Stop reason: HOSPADM

## 2025-06-24 RX ORDER — IPRATROPIUM BROMIDE AND ALBUTEROL SULFATE 2.5; .5 MG/3ML; MG/3ML
3 SOLUTION RESPIRATORY (INHALATION) 4 TIMES DAILY PRN
Status: DISCONTINUED | OUTPATIENT
Start: 2025-06-24 | End: 2025-06-27 | Stop reason: HOSPADM

## 2025-06-24 RX ORDER — TRIAMCINOLONE ACETONIDE 1 MG/G
1 CREAM TOPICAL 2 TIMES DAILY
COMMUNITY
Start: 2025-06-11

## 2025-06-24 RX ORDER — POLYETHYLENE GLYCOL 3350 17 G/17G
17 POWDER, FOR SOLUTION ORAL DAILY PRN
Status: DISCONTINUED | OUTPATIENT
Start: 2025-06-24 | End: 2025-06-27 | Stop reason: HOSPADM

## 2025-06-24 RX ADMIN — CEFEPIME 2000 MG: 2 INJECTION, POWDER, FOR SOLUTION INTRAVENOUS at 16:48

## 2025-06-24 RX ADMIN — HYDROCODONE BITARTRATE AND ACETAMINOPHEN 1 TABLET: 10; 325 TABLET ORAL at 18:56

## 2025-06-24 RX ADMIN — CEFEPIME 2000 MG: 2 INJECTION, POWDER, FOR SOLUTION INTRAVENOUS at 09:34

## 2025-06-24 RX ADMIN — SODIUM CHLORIDE, POTASSIUM CHLORIDE, SODIUM LACTATE AND CALCIUM CHLORIDE 125 ML/HR: 600; 310; 30; 20 INJECTION, SOLUTION INTRAVENOUS at 16:47

## 2025-06-24 RX ADMIN — SODIUM CHLORIDE 1000 ML: 9 INJECTION, SOLUTION INTRAVENOUS at 09:35

## 2025-06-24 RX ADMIN — ARFORMOTEROL TARTRATE 15 MCG: 15 SOLUTION RESPIRATORY (INHALATION) at 15:22

## 2025-06-24 RX ADMIN — Medication 10 ML: at 22:04

## 2025-06-24 RX ADMIN — ONDANSETRON 4 MG: 2 INJECTION INTRAMUSCULAR; INTRAVENOUS at 09:31

## 2025-06-24 RX ADMIN — HYDROMORPHONE HYDROCHLORIDE 1 MG: 1 INJECTION, SOLUTION INTRAMUSCULAR; INTRAVENOUS; SUBCUTANEOUS at 10:18

## 2025-06-24 RX ADMIN — Medication 10 ML: at 16:49

## 2025-06-24 RX ADMIN — INSULIN LISPRO 3 UNITS: 100 INJECTION, SOLUTION INTRAVENOUS; SUBCUTANEOUS at 16:53

## 2025-06-24 RX ADMIN — BUDESONIDE 0.5 MG: 0.5 SUSPENSION RESPIRATORY (INHALATION) at 15:22

## 2025-06-24 RX ADMIN — HEPARIN SODIUM 5000 UNITS: 5000 INJECTION INTRAVENOUS; SUBCUTANEOUS at 16:48

## 2025-06-24 RX ADMIN — ALPRAZOLAM 0.25 MG: 0.25 TABLET ORAL at 22:03

## 2025-06-24 NOTE — NURSING NOTE
Patient Isha Llanes admitted to 4MTU from the ED. Patient is alert and oriented to person and place. Patient oriented to unit, call light system and bed alarm use, teaching effective. Patient agreed to bed alarm use. Candida Auris screening revealed patient will NOT need tested, contact isolation and bleach cleaning due to no risk factors. Patient currently is not a concern for an active CDIFF infection.    4 eyes skin assessment completed with Other RN: Albania Morris RN.  Per policy, the following skin interventions were put in place as a result of the skin assessment below: Wound care/nutrition consulted for present wound, Wound care/nutrition consulted for skin breakdown, Wound care/nutrition consulted for a minesh score 18 or less, LDA created and pictures and measurements taken of present wounds, LDA created and pictures and measurements taken of current skin breakdown, Avoiding use of disposable briefs, Limit number of layers underneath patient, Barrier cream for incontinence, Barrier cream for skin breakdown, Q2 turns, and Pillow supprt for offloading pressure, floating heels or padding for bony prominences    Skin Assessments (most recent)      Flowsheet Row Most Recent Value   Skin WDL .WDL except, all   Skin Color/Characteristics maroon/purple, pale, redness blanchable, kassi, redness nonblanchable   Skin Temperature warm   Skin Moisture dry   Skin Elasticity slow return to original state   Skin Integrity bruised (ecchymotic), excoriation, scab, scar(s)   Sensory Perception 3-->slightly limited   Moisture 1-->constantly moist   Activity 1-->bedfast   Mobility 3-->slightly limited   Nutrition 2-->probably inadequate   Friction and Shear 2-->potential problem   Minesh Score 12            Fall assessment completed. Per policy, the patient was determined be a High fall risk due to Rothman Manuel score 15 or greater (only used within the first 24 hours of admission). Patient assessed to need the  following mobility assistance: 1 Assist with the following assistive devices: Walker, and will be using a bedpan for toileting. Per policy, the following fall interventions were put in place: Standard Fall Precautions - oriented to room and call light, bed low and locked, clutter free environment, adequate lighting, directed to call for assistance, non-skid footwear, hourly rounding and personal belongings within reach., Bed Alarm, Fall Risk Care Plan, Frequent reorientation, Gait Belt, and Room close to nurse's station.     Patient's belongings that were sent with family: None  Patient's belongings that were sent to security: None  Patient's belongings locked in safe box in room: None  Patient's belongings that were sent to pharmacy: None  Patient's belongings kept at bedside per patient: Clothing

## 2025-06-24 NOTE — PLAN OF CARE
Goal Outcome Evaluation:              Outcome Evaluation: Patient wakes up to physical stimulation, otherwise drowsy, on 2L NC, sinus tachycardia, patient has pain pump, wounds photographed and dressed, wound care consulted.

## 2025-06-24 NOTE — ED PROVIDER NOTES
Time: 8:34 AM EDT  Date of encounter:  6/24/2025  Independent Historian/Clinical History and Information was obtained by:   Patient and Nursing Staff    History is limited by: Acuity of Condition    Chief Complaint: Shortness of breath      History of Present Illness:  Patient is a 59 y.o. year old female who presents to the emergency department for evaluation of shortness of breath.  Patient is a 59-year-old female with multiple medical problems and multiple cancers who presents from home with reported shortness of breath.  Patient cannot give me a great history and states that she is short of breath.  History limited.      Patient Care Team  Primary Care Provider: Virgil Salas MD    Past Medical History:     Allergies   Allergen Reactions    Amoxicillin Shortness Of Breath     Has tolerated Cefazolin, Ceftriaxone, and Cefepime -Jermaine Melida, [facility]    Ceclor [Cefaclor] Shortness Of Breath     Has tolerated Cefazolin, Ceftriaxone, and Cefepime -Jermaine Monetz, [facility]      Penicillins Shortness Of Breath     Has tolerated Cefazolin, Ceftriaxone, and Cefepime -Jermaine Montez, [facility]    Sulfa Antibiotics Rash    Valium [Diazepam] Mental Status Change     DEPRESSED    Ambien [Zolpidem] Mental Status Change    Aspirin GI Intolerance    Ativan [Lorazepam] Mental Status Change    Benadryl [Diphenhydramine] Anxiety     AND MAKES HER HYPER    Biaxin [Clarithromycin] Unknown - Low Severity    Cephalexin Unknown - Low Severity    Clindamycin Unknown - Low Severity    Compazine [Prochlorperazine Edisylate] Rash    Contrast Dye (Echo Or Unknown Ct/Mr) Other (See Comments)     Caused pain in her arm    Doxycycline Rash    Nsaids GI Intolerance    Phenergan [Promethazine Hcl] GI Intolerance    Promethazine GI Intolerance    Vancomycin Itching     Past Medical History:   Diagnosis Date    Anesthesia     REPORTS HAS GOT REAL EMOTIONAL AND HAS BECOME ANGRY BEFORE    Anxiety     Aortic stenosis, severe     HAS BIO VALVE  REPLACED    Arthritis     Asthma     Back pain     Blood clotting disorder     test to find out what type    Cancer     CHF (congestive heart failure)     Chronic low back pain with sciatica     Chronic pain disorder     COPD (chronic obstructive pulmonary disease)     Coronary artery disease     looking to follow with Cardio    Diabetes mellitus     TYPE 2    Dyspnea on effort     Fatty liver     GERD (gastroesophageal reflux disease)     H/O lumpectomy     H/O: hysterectomy     Hiatal hernia     History of degenerative disc disease     History of kidney stones     History of pulmonary embolus (PE)     History of transfusion     AS A CHILD NO TRANSFUSION REACTION    Hodgkin's lymphoma     5/19/23  5 YEARS SINCE LAST CHEMO TX    Hypertension     Immobility     Lupus     Nausea vomiting and diarrhea 03/07/2024    Panic disorder     Pelvic pain     Peripheral neuropathy     Personal history of DVT (deep vein thrombosis)     over 20 years    PONV (postoperative nausea and vomiting)     Presence of intrathecal pump     PTSD (post-traumatic stress disorder)     Sacroiliac joint disease     SI (sacroiliac) joint inflammation     Venous insufficiency      Past Surgical History:   Procedure Laterality Date    ABDOMINAL SURGERY      BACK SURGERY      SPINAL FUSION     BACK SURGERY      BLADDER REPAIR      BRONCHOSCOPY WITH ION ROBOTIC ASSIST N/A 4/22/2025    Procedure: BRONCHOSCOPY WITH ION ROBOT: REBUS, EBUS, NEEDLE ASPIRATES, BIOPSIES, BRUSHINGS, BAL, WASHINGS: insertion of lighted robot navigated instrument to view inside the lung;  Surgeon: nEzo Segal MD;  Location: [facility] OR;  Service: Robotics - Pulmonary;  Laterality: N/A;    CARDIAC CATHETERIZATION N/A 03/06/2019    Procedure: Valvuloplasty;  Surgeon: Wayne Johnson MD;  Location: [facility] INVASIVE LOCATION;  Service: Cardiology    CARDIAC CATHETERIZATION      CARDIAC CATHETERIZATION Left 05/31/2023    Procedure: Cardiac  Catheterization/Vascular Study;  Surgeon: Poncho Reid MD;  Location: [facility] CATH INVASIVE LOCATION;  Service: Cardiovascular;  Laterality: Left;    CARDIAC SURGERY      Mechanical valve    CARDIAC VALVE REPLACEMENT  2021    CHOLECYSTECTOMY      COLONOSCOPY N/A 12/29/2021    Procedure: COLONOSCOPY;  Surgeon: Karine Orlando MD;  Location: [facility] ENDOSCOPY;  Service: Gastroenterology;  Laterality: N/A;  POOR PREP    COLONOSCOPY N/A 04/13/2022    Procedure: COLONOSCOPY WITH POLYPECTOMY;  Surgeon: Karine Orlando MD;  Location: [facility] ENDOSCOPY;  Service: Gastroenterology;  Laterality: N/A;  COLON POLYP, HEMORRHOIDS, POOR PREP    COLONOSCOPY      DIRECT LARYNGOSCOPY, ESOPHAGOSCOPY, BRONCHOSCOPY N/A 05/22/2023    Procedure: DIRECT LARYNGOSCOPY WITH BIOPSIES , ESOPHAGOSCOPY, BRONCHOSCOPY;  Surgeon: Ronnie Mcgarry MD;  Location: [facility] OR [facility];  Service: ENT;  Laterality: N/A;    ENDOSCOPY N/A 12/29/2021    Procedure: ESOPHAGOGASTRODUODENOSCOPY;  Surgeon: Karine Olrando MD;  Location: [facility] ENDOSCOPY;  Service: Gastroenterology;  Laterality: N/A;  ESOPHAGITIS, GASTRITIS, HIATAL HERNIA    ENDOSCOPY      ENDOSCOPY N/A 04/16/2024    Procedure: ESOPHAGOGASTRODUODENOSCOPY WITH BIOPSIES;  Surgeon: Karine Orlando MD;  Location: [facility] ENDOSCOPY;  Service: Gastroenterology;  Laterality: N/A;  ESOPHAGITIS, HIATAL HERNIA    ENDOSCOPY W/ PEG TUBE PLACEMENT N/A 12/10/2024    Procedure: ESOPHAGOGASTRODUODENOSCOPY WITH PERCUTANEOUS ENDOSCOPIC GASTROSTOMY TUBE INSERTION WITH ANESTHESIA;  Surgeon: Rodger Ortega MD;  Location: [facility] ENDOSCOPY;  Service: Gastroenterology;  Laterality: N/A;  PEG TUBE INSERTION    HYSTERECTOMY      LYMPHADENECTOMY      PAIN PUMP INSERTION/REVISION N/A 10/18/2019    Procedure: PAIN PUMP INSERTION [facility] 10[facility]18[facility]19 [place];  Surgeon: Horace Rios MD;  Location: [facility];  Service: Pain Management    PAIN PUMP INSERTION/REVISION N/A  11/23/2020    Procedure: pain pump removal;  Surgeon: Horace Rios MD;  Location: [facility] MAIN OR;  Service: Pain Management;  Laterality: N/A;    PEG TUBE INSERTION N/A 07/26/2023    Procedure: PERCUTANEOUS ENDOSCOPIC GASTROSTOMY TUBE INSERTION;  Surgeon: Kody Mercer MD;  Location: [facility] ENDOSCOPY;  Service: General;  Laterality: N/A;  SUCCESSFUL PEG TUBE PLACE PLACEMENT    PORTACATH PLACEMENT      IN LEFT CHEST REPORTS THAT IT IS NOT FUNCTIONING    SKIN BIOPSY      TONSILLECTOMY      TUBAL ABDOMINAL LIGATION      TUMOR REMOVAL      vaginal /anal area     Family History   Problem Relation Age of Onset    Heart failure Mother     Anxiety disorder Mother     Prostate cancer Father     Depression Sister     Bipolar disorder Sister     Pancreatic cancer Sister     ADD / ADHD Brother     Thyroid cancer Maternal Grandmother     Pancreatic cancer Paternal Grandmother     ADD / ADHD Grandson     ADD / ADHD Granddaughter     ADD / ADHD Nephew     Malig Hyperthermia Neg Hx        Home Medications:  Prior to Admission medications    Medication Sig Start Date End Date Taking? Authorizing Provider   ALPRAZolam (XANAX) 0.25 MG tablet Take 1 tablet by mouth 3 times a day. 11/25/24   Nette Jiménez MD   budesonide (Pulmicort) 0.5 MG/2ML nebulizer solution Take 2 mL by nebulization 2 (Two) Times a Day for 30 days. Rinse mouth out after each use 4/9/25 6/5/25  Xochitl Estrada APRN   famotidine (Pepcid) 40 MG tablet Take 1 tablet by mouth every night at bedtime. 3/27/25   Sobeida Espinosa APRN   furosemide (LASIX) 20 MG tablet Take 1 tablet by mouth Daily.    Nette Jiménez MD   Insulin Lispro, 1 Unit Dial, (HUMALOG) 100 UNIT/ML solution pen-injector Inject 12 Units under the skin into the appropriate area as directed Daily. 2/14/25   Nette Jiménez MD   ipratropium-albuterol (DUO-NEB) 0.5-2.5 mg/3 ml nebulizer Take 3 mL by nebulization 4 (Four) Times a Day As Needed for Wheezing for up to 30  days. 4/9/25 6/5/25  Xochitl Estrada APRN   loperamide (IMODIUM) 2 MG capsule Take 1 capsule by mouth 4 (Four) Times a Day As Needed for Diarrhea (2 with 1st episode of diarrhea and 1 with each additonal episode, max of 8 per day.). 4/21/25   Sobeida Espinosa APRN   metoprolol succinate XL (TOPROL-XL) 25 MG 24 hr tablet Take 1 tablet by mouth every night at bedtime. 4/17/25   Fredis Chris MD   ondansetron ODT (ZOFRAN-ODT) 8 MG disintegrating tablet Place 1 tablet on the tongue Daily As Needed. 3/27/25   Nette Jiménez MD   oxyCODONE (ROXICODONE) 10 MG tablet Take 1 tablet by mouth Every 4 (Four) to 6 (Six) Hours As Needed for Pain. 6/3/25   Nette Jiménez MD   pain patient supplied pump by Intrathecal route Continuous. Type of Medication: Hydromorphone 15 mg/ml and Bupivacaine 0.5 mg/ml  [employer] [phone]  Provider:Dr. Boudreaux  Provider number: [phone]  Basal Dose: Hydromorphone 7.518 mg/day Bupivacaine 0.87008 mg/day  Pump capacity: 40ml  ( MAX of Hydromorphone 9.456 mg/ day; Bupivacaine 0.47742 mg /day)  Bolus Dose:Hydromorphone 0.500 mg, Bupivacaine 0.80768 mg  Max activations: 4 per day  Lockout 3 hours. 1 Bolus per every 3 hours   Last refill-  4/24/2025  Alarm date- 6/23/2025  Pump manufacture:MedManny Escobar MD   PARoxetine (PAXIL) 20 MG tablet Take 1 tablet by mouth Every Morning. 3/27/25   Nette Jiménez MD   predniSONE (DELTASONE) 5 MG tablet Take 3 tablets by mouth 2 (Two) Times a Day. 15mg in am 15mg in pm  Patient taking differently: Take 3 tablets by mouth 2 (Two) Times a Day. 5/5/25   Virgil Navarro MD        Social History:   Social History     Tobacco Use    Smoking status: Every Day     Current packs/day: 2.00     Average packs/day: 2.0 packs/day for 46.5 years (93.0 ttl pk-yrs)     Types: Cigarettes     Start date: 1979     Passive exposure: Current    Smokeless tobacco: Never    Tobacco comments:     Currently smoking 1.5 daily. 04--js  "  Vaping Use    Vaping status: Never Used   Substance Use Topics    Alcohol use: Never    Drug use: Never         Review of Systems:  Review of Systems     Physical Exam:  /76   Pulse (!) 132   Temp 100.4 °F (38 °C)   Resp 25   Ht 160 cm (63\")   Wt 52.4 kg (115 lb 8.3 oz)   LMP  (LMP Unknown)   SpO2 98%   BMI 20.46 kg/m²     Physical Exam  Vitals and nursing note reviewed.   Constitutional:       Appearance: She is ill-appearing.   HENT:      Head: Normocephalic and atraumatic.   Eyes:      General: No scleral icterus.  Cardiovascular:      Rate and Rhythm: Tachycardia present.      Heart sounds: Normal heart sounds.   Pulmonary:      Effort: Respiratory distress present.      Breath sounds: Rales present.   Abdominal:      Palpations: Abdomen is soft.      Tenderness: There is no abdominal tenderness.   Musculoskeletal:         General: Normal range of motion.      Cervical back: Normal range of motion.      Right lower leg: Edema present.      Left lower leg: Edema present.      Comments: Bilateral lower extremity swelling with erythema and warmth to the left lower extremity   Skin:     Findings: No rash.   Neurological:      General: No focal deficit present.      Mental Status: She is alert.                    Medical Decision Making:      Comorbidities that affect care:    Cancer, heart failure, substance abuse, COPD, hypertension    External Notes reviewed:    Reviewed discharge summary from 6/11/2025      The following orders were placed and all results were independently analyzed by me:  Orders Placed This Encounter   Procedures    Blood Culture - Blood,    Blood Culture - Blood,    Wound Culture - Swab, Leg, Left    COVID-19, FLU A/B, RSV PCR 1 HR TAT - Swab, Nasopharynx    XR Chest 1 View    Ellery Draw    Comprehensive Metabolic Panel    Magnesium    High Sensitivity Troponin T    TSH Rfx On Abnormal To Free T4    CBC Auto Differential    Urinalysis With Culture If Indicated - Urine, " Catheter    Lactic Acid, Plasma    Blood Gas, Venous -    STAT Lactic Acid, Reflex    High Sensitivity Troponin T 1Hr    NPO Diet NPO Type: Strict NPO    Undress & Gown    Continuous Pulse Oximetry    Inpatient Hospitalist Consult    Oxygen Therapy- Nasal Cannula; Titrate 1-6 LPM Per SpO2; 90 - 95%    POC Glucose Once    POC Lactate    POC Electrolyte Panel    ECG 12 Lead ED Triage Standing Order; Dysrhythmia    Insert Peripheral IV    Inpatient Admission    CBC & Differential    Green Top (Gel)    Lavender Top    Gold Top - SST    Light Blue Top       Medications Given in the Emergency Department:  Medications   sodium chloride 0.9 % flush 10 mL (has no administration in time range)   lactated ringers infusion (has no administration in time range)   sodium chloride 0.9 % bolus 1,000 mL (1,000 mL Intravenous New Bag 6/24/25 0935)   cefepime 2000 mg IVPB in 100 mL NS (VTB) (0 mg Intravenous Stopped 6/24/25 1018)   ondansetron (ZOFRAN) injection 4 mg (4 mg Intravenous Given 6/24/25 0931)   HYDROmorphone (DILAUDID) injection 1 mg (1 mg Intravenous Given 6/24/25 1018)        ED Course:    ED Course as of 06/24/25 1052   Tue Jun 24, 2025   0859 EKG interpreted by me  Time: 817  Heart rate 158  Sinus tach, LVH, left bundle branch block, nonspecific ST changes [MA]   0903 Per respiratory the patient refusing stick for ABG. [MA]   1044 Spoke with Dr. Davila who agrees to admit.  [MA]      ED Course User Index  [MA] Jimmy Hudson MD       Labs:    Lab Results (last 24 hours)       Procedure Component Value Units Date/Time    CBC & Differential [[id number]]  (Abnormal) Collected: 06/24/25 0850    Specimen: Blood Updated: 06/24/25 0908    Narrative:      The following orders were created for panel order CBC & Differential.  Procedure                               Abnormality         Status                     ---------                               -----------         ------                     CBC Auto  Differential[[id number]]        Abnormal            Final result                 Please view results for these tests on the individual orders.    Comprehensive Metabolic Panel [[id number]]  (Abnormal) Collected: 06/24/25 0850    Specimen: Blood Updated: 06/24/25 0928     Glucose 267 mg/dL      BUN 9.2 mg/dL      Creatinine 0.44 mg/dL      Sodium 131 mmol/L      Potassium 4.4 mmol/L      Chloride 92 mmol/L      CO2 25.3 mmol/L      Calcium 8.9 mg/dL      Total Protein 6.4 g/dL      Albumin 2.8 g/dL      ALT (SGPT) 9 U/L      AST (SGOT) 16 U/L      Alkaline Phosphatase 146 U/L      Total Bilirubin 0.4 mg/dL      Globulin 3.6 gm/dL      A/G Ratio 0.8 g/dL      BUN/Creatinine Ratio 20.9     Anion Gap 13.7 mmol/L      eGFR 111.6 mL/min/1.73     Narrative:      GFR Categories in Chronic Kidney Disease (CKD)              GFR Category          GFR (mL/min/1.73)    Interpretation  G1                    90 or greater        Normal or high (1)  G2                    60-89                Mild decrease (1)  G3a                   45-59                Mild to moderate decrease  G3b                   30-44                Moderate to severe decrease  G4                    15-29                Severe decrease  G5                    14 or less           Kidney failure    (1)In the absence of evidence of kidney disease, neither GFR category G1 or G2 fulfill the criteria for CKD.    eGFR calculation 2021 CKD-EPI creatinine equation, which does not include race as a factor    Magnesium [[id number]]  (Abnormal) Collected: 06/24/25 0850    Specimen: Blood Updated: 06/24/25 0928     Magnesium 1.3 mg/dL     High Sensitivity Troponin T [[id number]]  (Abnormal) Collected: 06/24/25 0850    Specimen: Blood Updated: 06/24/25 0932     HS Troponin T 44 ng/L     Narrative:      High Sensitive Troponin T Reference Range:  <14.0 ng/L- Negative Female for AMI  <22.0 ng/L- Negative Male for AMI  >=14 - Abnormal Female indicating possible myocardial  injury.  >=22 - Abnormal Male indicating possible myocardial injury.   Clinicians would have to utilize clinical acumen, EKG, Troponin, and serial changes to determine if it is an Acute Myocardial Infarction or myocardial injury due to an underlying chronic condition.         TSH Rfx On Abnormal To Free T4 [[id number]]  (Normal) Collected: 06/24/25 0850    Specimen: Blood Updated: 06/24/25 0932     TSH 1.870 uIU/mL     CBC Auto Differential [[id number]]  (Abnormal) Collected: 06/24/25 0850    Specimen: Blood Updated: 06/24/25 0908     WBC 17.42 10*3/mm3      RBC 4.88 10*6/mm3      Hemoglobin 12.1 g/dL      Hematocrit 40.4 %      MCV 82.8 fL      MCH 24.8 pg      MCHC 30.0 g/dL      RDW 15.4 %      RDW-SD 47.3 fl      MPV 10.9 fL      Platelets 251 10*3/mm3      Neutrophil % 91.9 %      Lymphocyte % 3.6 %      Monocyte % 3.2 %      Eosinophil % 0.0 %      Basophil % 0.2 %      Immature Grans % 1.1 %      Neutrophils, Absolute 16.02 10*3/mm3      Lymphocytes, Absolute 0.62 10*3/mm3      Monocytes, Absolute 0.56 10*3/mm3      Eosinophils, Absolute 0.00 10*3/mm3      Basophils, Absolute 0.03 10*3/mm3      Immature Grans, Absolute 0.19 10*3/mm3      nRBC 0.0 /100 WBC     Lactic Acid, Plasma [[id number]]  (Abnormal) Collected: 06/24/25 0850    Specimen: Blood Updated: 06/24/25 0940     Lactate 2.8 mmol/L     Blood Culture - Blood, Arm, Right [[id number]] Collected: 06/24/25 0850    Specimen: Blood from Arm, Right Updated: 06/24/25 0901    Blood Culture - Blood, Arm, Left [[id number]] Collected: 06/24/25 0850    Specimen: Blood from Arm, Left Updated: 06/24/25 0900    POC Glucose Once [[id number]]  (Abnormal) Collected: 06/24/25 0915    Specimen: Venous Blood Updated: 06/24/25 0919     Glucose 264 mg/dL      Comment: Serial Number: [id number]:  [id number]       Blood Gas, Venous - [[id number]]  (Abnormal) Collected: 06/24/25 0915    Specimen: Venous Blood Updated: 06/24/25 0919     pH, Venous 7.429 pH Units      pCO2, Venous 49.3  mm Hg      pO2, Venous 35.8 mm Hg      HCO3, Venous 32.6 mmol/L      Base Excess, Venous 6.8 mmol/L      Comment: Serial Number: [id number]:  [id number]        O2 Saturation, Venous 69.3 %      Hemoglobin, Blood Gas 15.8 g/dL      Barometric Pressure for Blood Gas 748.5000 mmHg      Modality Cannula     Flow Rate 3.0000 lpm      Notified Who Allinder    POC Lactate [[id number]]  (Abnormal) Collected: 06/24/25 0915    Specimen: Venous Blood Updated: 06/24/25 0919     Lactate 2.5 mmol/L      Comment: Serial Number: [id number]:  [id number]       POC Electrolyte Panel [[id number]]  (Abnormal) Collected: 06/24/25 0915    Specimen: Venous Blood Updated: 06/24/25 0919     Sodium 133 mmol/L      POC Potassium 4.1 mmol/L      Chloride 94 mmol/L      Ionized Calcium 1.14 mmol/L      Comment: Serial Number: [id number]:  [id number]       Wound Culture - Swab, Leg, Left [[id number]] Collected: 06/24/25 0916    Specimen: Swab from Leg, Left Updated: 06/24/25 0923    COVID-19, FLU A/B, RSV PCR 1 HR TAT - Swab, Nasopharynx [[id number]]  (Normal) Collected: 06/24/25 0916    Specimen: Swab from Nasopharynx Updated: 06/24/25 1006     COVID19 Not Detected     Influenza A PCR Not Detected     Influenza B PCR Not Detected     RSV, PCR Not Detected    Narrative:      Fact sheet for providers: https://www.fda.gov/media/510116/download    Fact sheet for patients: https://www.fda.gov/media/817559/download    Test performed by PCR.    High Sensitivity Troponin T 1Hr [[id number]] Collected: 06/24/25 1019    Specimen: Blood Updated: 06/24/25 1040             Imaging:    XR Chest 1 View  Result Date: 6/24/2025  XR CHEST 1 VW Date of Exam: 6/24/2025 9:24 AM EDT Indication: Dysrhythmia Triage Protocol Comparison: None available. Findings: There are no airspace consolidations. TAVR changes. Calcified granuloma at the right lung base. Left internal jugular chest port in place. No pleural fluid. No pneumothorax. The pulmonary vasculature appears within  normal limits. The cardiac and mediastinal silhouette appear unremarkable. No acute osseous abnormality identified.     Impression: No radiographic evidence of acute pulmonary process Electronically Signed: Drake Zhao MD  6/24/2025 9:46 AM EDT  Workstation ID: [id number]        Differential Diagnosis and Discussion:    Dyspnea: Differential diagnosis includes but is not limited to metabolic acidosis, neurological disorders, psychogenic, asthma, pneumothorax, upper airway obstruction, COPD, pneumonia, noncardiogenic pulmonary edema, interstitial lung disease, anemia, congestive heart failure, and pulmonary embolism    PROCEDURES:    Labs were collected in the emergency department and all labs were reviewed and interpreted by me.  X-ray were performed in the emergency department and all X-ray impressions were independently interpreted by me.  An EKG was performed and the EKG was interpreted by me.    ECG 12 Lead ED Triage Standing Order; Dysrhythmia    (Results Pending)       Procedures    MDM       Patient is a 59-year-old who has multiple medical problems and is chronically ill who has been in and out of the hospital multiple times recently.  Recently left AMA about a week ago.  Presented today with elevated heart rate, weakness, shortness of breath.  Appears to be septic with fever, elevated white count, elevated lactic and left lower extremity warmth and erythema.  Patient is chronically ill but appears to be acutely ill at this time.  Will place on antibiotics.  Will need admission to the hospital for further workup management.  Patient's prognosis is guarded.    Total Critical Care time of 33 minutes. Total critical care time documented does not include time spent on separately billed procedures for services of nurses or physician assistants. I personally saw and examined the patient. I have reviewed all diagnostic interpretations and treatment plans as written. I was present for the key portions of any  procedures performed and the inclusive time noted in any critical care statement. Critical care time includes patient management by me, time spent at the patients bedside,  time to review lab and imaging results, discussing patient care, documentation in the medical record, and time spent with family or caregiver.      Sepsis criteria was met in the emergency department and the Sepsis protocol (including antibiotic administration) was initiated.      SIRS criteria considered:   1.  Temperature > 100.4 or <96.8    2.  Heart Rate > 90    3.  Respiratory Rate > 22    4.  WBC > 12K or <4K.             Severe Sepsis:     Respiratory: Mechanical Ventilation or Bipap  Hypotension: SBP > 90 or MAP < 65  Renal: Creatinine > 2  Metabolic: Lactic Acid > 2  Hematologic: Platelets < 100K or INR > 1.5  Hepatic: BILI  >  2  CNS: Sudden AMS     Septic Shock:     Severe Sepsis + Persistent hypotension or Lactic Acid > 4     Normal saline bolus, Antibiotics, and final disposition was based on these definitions.        Sepsis was recognized at 0840    Antibiotics were ordered.     30 mL/kg bolus was not indicated.           The patient presents with 2 out of the 4 SIRS criteria and a suspected source for sepsis.  Patient was evaluated and placed on a cardiac monitor for fear of worsening tachycardia and life-threatening hypotension.  Patient was monitored for shock and signs of end-organ damage.  Mental status was repeatedly checked throughout the ED stay.  Medications were ordered by me which includes abx and fluids.  The case was discussed at length with admitting physician.       Patient Care Considerations:          Consultants/Shared Management Plan:    Hospitalist: I have discussed the case with Dr. Davila who agrees to accept the patient for admission.    Social Determinants of Health:    Patient is independent, reliable, and has access to care.       Disposition and Care Coordination:    Admit:   Through independent evaluation  of the patient's history, physical, and imperical data, the patient meets criteria for inpatient admission to the hospital.        Final diagnoses:   Sepsis, due to unspecified organism, unspecified whether acute organ dysfunction present   Left leg cellulitis        ED Disposition       ED Disposition   Decision to Admit    Condition   --    Comment   Level of Care: Telemetry [5]   Diagnosis: Cellulitis [[id number]]   Admitting Physician: LUIS A HERNANDEZ [[id number]   Attending Physician: LUIS A HERNANDEZ [[id number]]   Certification: I Certify That Inpatient Hospital Services Are Medically Necessary For Greater Than 2 Midnights                 This medical record created using voice recognition software.             Jimmy Hudson MD  06/24/25 1058

## 2025-06-24 NOTE — H&P
" [facility]  [facility]   Patient Name: Isha Llanes  : 1965  MRN: [id number]  Primary Care Physician:  Virgil Salas MD  Date of admission: 2025    Subjective   Subjective     Chief Complaint: \" Feeling bad\"    HPI:    Isha Llanes is a 59 y.o. female with a past medical history significant for CHF, COPD, anaplastic lymphoma, non-small cell lung cancer, presents to the hospital with shortness of breath, tachycardia, upon inspection patient has pretty severe lower extremity cellulitis, she was tachycardic, she had a leukocytosis, she had borderline blood pressures, patient was given IV fluids, patient was started on cefepime.  Patient is being admitted to the hospital for treatment of her sepsis      Personal History     Past Medical History:  Past Medical History:   Diagnosis Date    Anesthesia     Anxiety     Aortic stenosis, severe     Arthritis     Asthma     Back pain     Blood clotting disorder     Cancer     CHF (congestive heart failure)     Chronic low back pain with sciatica     Chronic pain disorder     COPD (chronic obstructive pulmonary disease)     Coronary artery disease     Diabetes mellitus     Dyspnea on effort     Fatty liver     GERD (gastroesophageal reflux disease)     H/O lumpectomy     H/O: hysterectomy     Hiatal hernia     History of degenerative disc disease     History of kidney stones     History of pulmonary embolus (PE)     History of transfusion     Hodgkin's lymphoma     Hypertension     Immobility     Lupus     Nausea vomiting and diarrhea 2024    Panic disorder     Pelvic pain     Peripheral neuropathy     Personal history of DVT (deep vein thrombosis)     PONV (postoperative nausea and vomiting)     Presence of intrathecal pump     PTSD (post-traumatic stress disorder)     Sacroiliac joint disease     SI (sacroiliac) joint inflammation     Venous insufficiency        Past Surgical History:  Past " Surgical History:   Procedure Laterality Date    ABDOMINAL SURGERY      BACK SURGERY      SPINAL FUSION     BACK SURGERY      BLADDER REPAIR      BRONCHOSCOPY WITH ION ROBOTIC ASSIST N/A 4/22/2025    Procedure: BRONCHOSCOPY WITH ION ROBOT: REBUS, EBUS, NEEDLE ASPIRATES, BIOPSIES, BRUSHINGS, BAL, WASHINGS: insertion of lighted robot navigated instrument to view inside the lung;  Surgeon: Enzo Segal MD;  Location: [facility] MAIN OR;  Service: Robotics - Pulmonary;  Laterality: N/A;    CARDIAC CATHETERIZATION N/A 03/06/2019    Procedure: Valvuloplasty;  Surgeon: Wayne Johnson MD;  Location: [facility] CATH INVASIVE LOCATION;  Service: Cardiology    CARDIAC CATHETERIZATION      CARDIAC CATHETERIZATION Left 05/31/2023    Procedure: Cardiac Catheterization/Vascular Study;  Surgeon: Poncho Reid MD;  Location: [facility] CATH INVASIVE LOCATION;  Service: Cardiovascular;  Laterality: Left;    CARDIAC SURGERY      Mechanical valve    CARDIAC VALVE REPLACEMENT  2021    CHOLECYSTECTOMY      COLONOSCOPY N/A 12/29/2021    Procedure: COLONOSCOPY;  Surgeon: Karine Orlando MD;  Location: [facility] ENDOSCOPY;  Service: Gastroenterology;  Laterality: N/A;  POOR PREP    COLONOSCOPY N/A 04/13/2022    Procedure: COLONOSCOPY WITH POLYPECTOMY;  Surgeon: Karine Orlando MD;  Location: [facility] ENDOSCOPY;  Service: Gastroenterology;  Laterality: N/A;  COLON POLYP, HEMORRHOIDS, POOR PREP    COLONOSCOPY      DIRECT LARYNGOSCOPY, ESOPHAGOSCOPY, BRONCHOSCOPY N/A 05/22/2023    Procedure: DIRECT LARYNGOSCOPY WITH BIOPSIES , ESOPHAGOSCOPY, BRONCHOSCOPY;  Surgeon: Ronnie Mcgarry MD;  Location: [facility] OR OSC;  Service: ENT;  Laterality: N/A;    ENDOSCOPY N/A 12/29/2021    Procedure: ESOPHAGOGASTRODUODENOSCOPY;  Surgeon: Karine Orlando MD;  Location: [facility] ENDOSCOPY;  Service: Gastroenterology;  Laterality: N/A;  ESOPHAGITIS, GASTRITIS, HIATAL HERNIA    ENDOSCOPY      ENDOSCOPY N/A 04/16/2024    Procedure:  ESOPHAGOGASTRODUODENOSCOPY WITH BIOPSIES;  Surgeon: Karine Orlando MD;  Location: [facility] ENDOSCOPY;  Service: Gastroenterology;  Laterality: N/A;  ESOPHAGITIS, HIATAL HERNIA    ENDOSCOPY W/ PEG TUBE PLACEMENT N/A 12/10/2024    Procedure: ESOPHAGOGASTRODUODENOSCOPY WITH PERCUTANEOUS ENDOSCOPIC GASTROSTOMY TUBE INSERTION WITH ANESTHESIA;  Surgeon: Rodger Ortega MD;  Location: [facility] ENDOSCOPY;  Service: Gastroenterology;  Laterality: N/A;  PEG TUBE INSERTION    HYSTERECTOMY      LYMPHADENECTOMY      PAIN PUMP INSERTION/REVISION N/A 10/18/2019    Procedure: PAIN PUMP INSERTION hospitals 10-18-19 [place];  Surgeon: Horace Rios MD;  Location: [facility] OR;  Service: Pain Management    PAIN PUMP INSERTION/REVISION N/A 11/23/2020    Procedure: pain pump removal;  Surgeon: Horace Rios MD;  Location: [facility] OR;  Service: Pain Management;  Laterality: N/A;    PEG TUBE INSERTION N/A 07/26/2023    Procedure: PERCUTANEOUS ENDOSCOPIC GASTROSTOMY TUBE INSERTION;  Surgeon: Kody Mercer MD;  Location: [facility] ENDOSCOPY;  Service: General;  Laterality: N/A;  SUCCESSFUL PEG TUBE PLACE PLACEMENT    PORTACATH PLACEMENT      IN LEFT CHEST REPORTS THAT IT IS NOT FUNCTIONING    SKIN BIOPSY      TONSILLECTOMY      TUBAL ABDOMINAL LIGATION      TUMOR REMOVAL      vaginal /anal area       Family History:   family history includes ADD / ADHD in her brother, granddaughter, grandson, and nephew; Anxiety disorder in her mother; Bipolar disorder in her sister; Depression in her sister; Heart failure in her mother; Pancreatic cancer in her paternal grandmother and sister; Prostate cancer in her father; Thyroid cancer in her maternal grandmother.    Social History:    reports that she has been smoking cigarettes. She started smoking about 46 years ago. She has a 93 pack-year smoking history. She has been exposed to tobacco smoke. She has never used smokeless tobacco. She reports that she does not drink  alcohol and does not use drugs.    Home Medications:  ALPRAZolam, Insulin Lispro (1 Unit Dial), PARoxetine, budesonide, famotidine, furosemide, ipratropium-albuterol, loperamide, metoprolol succinate XL, ondansetron ODT, oxyCODONE, pain, and predniSONE    Allergies:  Allergies   Allergen Reactions    Amoxicillin Shortness Of Breath     Has tolerated Cefazolin, Ceftriaxone, and Cefepime -[facility], [facility]    Ceclor [Cefaclor] Shortness Of Breath     Has tolerated Cefazolin, Ceftriaxone, and Cefepime -[facility], [facility]      Penicillins Shortness Of Breath     Has tolerated Cefazolin, Ceftriaxone, and Cefepime -[facility], [facility]    Sulfa Antibiotics Rash    Valium [Diazepam] Mental Status Change     DEPRESSED    Ambien [Zolpidem] Mental Status Change    Aspirin GI Intolerance    Ativan [Lorazepam] Mental Status Change    Benadryl [Diphenhydramine] Anxiety     AND MAKES HER HYPER    Biaxin [Clarithromycin] Unknown - Low Severity    Cephalexin Unknown - Low Severity    Clindamycin Unknown - Low Severity    Compazine [Prochlorperazine Edisylate] Rash    Contrast Dye (Echo Or Unknown Ct/Mr) Other (See Comments)     Caused pain in her arm    Doxycycline Rash    Nsaids GI Intolerance    Phenergan [Promethazine Hcl] GI Intolerance    Promethazine GI Intolerance    Vancomycin Itching       Review of Systems:  All systems reviewed and negative other than stated in HPI    Objective   Objective     Vitals:   Temp:  [100.4 °F (38 °C)] 100.4 °F (38 °C)  Heart Rate:  [132-161] 132  Resp:  [25] 25  BP: (113)/(76-79) 113/76  Flow (L/min) (Oxygen Therapy):  [3] 3    Physical Exam   GEN: No acute distress  HEENT: Moist mucous membranes  LUNGS: Equal chest rise bilaterally  CARDIAC: Regular rate and rhythm  NEURO: Moving all 4 extremities spontaneously  SKIN: Lower extremity cellulitis    Result Review:  I have personally reviewed the results from the time of this admission to 6/24/2025 10:47 EDT and agree with these  findings:  [x]  Laboratory  CMP          6/8/2025    05:10 6/10/2025    10:21 6/24/2025    08:50   CMP   Glucose 116  239  267    BUN 6.8  11.5  9.2    Creatinine 0.37  0.43  0.44    EGFR 116.3  112.2  111.6    Sodium 131  131  131    Potassium 4.0  4.1  4.4    Chloride 94  94  92    Calcium 8.3  8.5  8.9    Total Protein 5.7  6.1  6.4    Albumin 3.1  2.9  2.8    Globulin   3.6    Total Bilirubin 0.6  0.6  0.4    Alkaline Phosphatase 107  106  146    AST (SGOT) 12  12  16    ALT (SGPT) 10  13  9    Albumin/Globulin Ratio   0.8    BUN/Creatinine Ratio 18.4  26.7  20.9    Anion Gap 12.7  10.0  13.7      CBC          6/8/2025    05:10 6/10/2025    10:21 6/24/2025    08:50   CBC   WBC 6.76  6.42  17.42    RBC 5.27  5.19  4.88    Hemoglobin 13.3  12.9  12.1    Hematocrit 43.0  42.2  40.4    MCV 81.6  81.3  82.8    MCH 25.2  24.9  24.8    MCHC 30.9  30.6  30.0    RDW 15.2  14.7  15.4    Platelets 152  173  251      []  Microbiology  []  Radiology  []  EKG/Telemetry   []  Cardiology/Vascular   []  Pathology  []  Old records  []  Other:      Assessment & Plan   Assessment / Plan     Assessment:   Sepsis  Lower extremity cellulitis  Tachycardia  Anaplastic lymphoma  Lung cancer  Anxiety  COPD without acute exacerbation  Diabetes mellitus  GERD  Compression fractures of the lumbar spine  History of CHF not in acute exacerbation  Aortic stenosis    Plan:  Patient admitted to the hospital for further workup and management of above  Patient found to meet sepsis criteria, received sepsis fluids in the ED  Continue IV hydration with 125 cc/h of lactated Ringer's, monitor volume status and respiratory status closely given history of CHF  Continue cefepime for treatment of her lower extremity cellulitis  Blood cultures ordered and pending  Urinalysis ordered and pending to rule out infection of the urine as well  Continue gentle IV hydration monitoring volume status and respiratory status closely  Hold diuretics  Repeat lactate  improving  Blood cultures ordered and pending  Resume home pain medications, may need to hold if blood pressure does not tolerate  Ml Parks DuoNebs  CBC, CMP reviewed  Clinical course will dictate further management    DVT prophylaxis: Heparin  GI: Pepcid  Diet: Regular  Telemetry: Reviewed, sinus tachycardia    Reviewed patients labs and imaging, and discussed with patient and nurse at bedside, discussed with emergency room physician    CODE STATUS:         Admission Status:  I believe this patient meets inpatient status.      Electronically signed by Bull Davila MD, 06/24/25, 10:47 AM EDT.

## 2025-06-24 NOTE — LETTER
June 26, 2025     Patient: Isha Llanes   YOB: 1965   Date of Visit: 6/24/2025       To Whom It May Concern:    Sheila Blackwoodbs mother (Isha Llanes) is currently admitted to [facility]. She had to visit today to help make medical decisions. Please excuse her from work today 6/26/25.           Sincerely,    JONATHON Gonzalez

## 2025-06-25 LAB
ALBUMIN SERPL-MCNC: 2 G/DL (ref 3.5–5.2)
ALBUMIN/GLOB SERPL: 0.6 G/DL
ALP SERPL-CCNC: 103 U/L (ref 39–117)
ALT SERPL W P-5'-P-CCNC: 7 U/L (ref 1–33)
ANION GAP SERPL CALCULATED.3IONS-SCNC: 9 MMOL/L (ref 5–15)
AST SERPL-CCNC: 13 U/L (ref 1–32)
BASOPHILS # BLD AUTO: 0.01 10*3/MM3 (ref 0–0.2)
BASOPHILS NFR BLD AUTO: 0.1 % (ref 0–1.5)
BILIRUB SERPL-MCNC: 0.2 MG/DL (ref 0–1.2)
BUN SERPL-MCNC: 7.1 MG/DL (ref 6–20)
BUN/CREAT SERPL: 30.9 (ref 7–25)
CALCIUM SPEC-SCNC: 8 MG/DL (ref 8.6–10.5)
CHLORIDE SERPL-SCNC: 99 MMOL/L (ref 98–107)
CO2 SERPL-SCNC: 25 MMOL/L (ref 22–29)
CREAT SERPL-MCNC: 0.23 MG/DL (ref 0.57–1)
DEPRECATED RDW RBC AUTO: 47.5 FL (ref 37–54)
EGFRCR SERPLBLD CKD-EPI 2021: 130.5 ML/MIN/1.73
EOSINOPHIL # BLD AUTO: 0.01 10*3/MM3 (ref 0–0.4)
EOSINOPHIL NFR BLD AUTO: 0.1 % (ref 0.3–6.2)
ERYTHROCYTE [DISTWIDTH] IN BLOOD BY AUTOMATED COUNT: 15.3 % (ref 12.3–15.4)
GLOBULIN UR ELPH-MCNC: 3.1 GM/DL
GLUCOSE BLDC GLUCOMTR-MCNC: 131 MG/DL (ref 70–99)
GLUCOSE BLDC GLUCOMTR-MCNC: 135 MG/DL (ref 70–99)
GLUCOSE BLDC GLUCOMTR-MCNC: 171 MG/DL (ref 70–99)
GLUCOSE BLDC GLUCOMTR-MCNC: 200 MG/DL (ref 70–99)
GLUCOSE SERPL-MCNC: 143 MG/DL (ref 65–99)
HCT VFR BLD AUTO: 37.1 % (ref 34–46.6)
HGB BLD-MCNC: 10.8 G/DL (ref 12–15.9)
IMM GRANULOCYTES # BLD AUTO: 0.05 10*3/MM3 (ref 0–0.05)
IMM GRANULOCYTES NFR BLD AUTO: 0.7 % (ref 0–0.5)
LYMPHOCYTES # BLD AUTO: 0.44 10*3/MM3 (ref 0.7–3.1)
LYMPHOCYTES NFR BLD AUTO: 6.4 % (ref 19.6–45.3)
MAGNESIUM SERPL-MCNC: 1.4 MG/DL (ref 1.6–2.6)
MCH RBC QN AUTO: 24.7 PG (ref 26.6–33)
MCHC RBC AUTO-ENTMCNC: 29.1 G/DL (ref 31.5–35.7)
MCV RBC AUTO: 84.9 FL (ref 79–97)
MONOCYTES # BLD AUTO: 0.48 10*3/MM3 (ref 0.1–0.9)
MONOCYTES NFR BLD AUTO: 6.9 % (ref 5–12)
NEUTROPHILS NFR BLD AUTO: 5.93 10*3/MM3 (ref 1.7–7)
NEUTROPHILS NFR BLD AUTO: 85.8 % (ref 42.7–76)
NRBC BLD AUTO-RTO: 0 /100 WBC (ref 0–0.2)
PHOSPHATE SERPL-MCNC: 2.7 MG/DL (ref 2.5–4.5)
PLATELET # BLD AUTO: 151 10*3/MM3 (ref 140–450)
PMV BLD AUTO: 10.4 FL (ref 6–12)
POTASSIUM SERPL-SCNC: 3.6 MMOL/L (ref 3.5–5.2)
PROT SERPL-MCNC: 5.1 G/DL (ref 6–8.5)
RBC # BLD AUTO: 4.37 10*6/MM3 (ref 3.77–5.28)
SODIUM SERPL-SCNC: 133 MMOL/L (ref 136–145)
WBC NRBC COR # BLD AUTO: 6.92 10*3/MM3 (ref 3.4–10.8)

## 2025-06-25 PROCEDURE — 25810000003 LACTATED RINGERS PER 1000 ML: Performed by: INTERNAL MEDICINE

## 2025-06-25 PROCEDURE — 85025 COMPLETE CBC W/AUTO DIFF WBC: CPT | Performed by: INTERNAL MEDICINE

## 2025-06-25 PROCEDURE — 82948 REAGENT STRIP/BLOOD GLUCOSE: CPT

## 2025-06-25 PROCEDURE — 94761 N-INVAS EAR/PLS OXIMETRY MLT: CPT

## 2025-06-25 PROCEDURE — 99233 SBSQ HOSP IP/OBS HIGH 50: CPT | Performed by: INTERNAL MEDICINE

## 2025-06-25 PROCEDURE — 25010000002 HEPARIN (PORCINE) PER 1000 UNITS: Performed by: INTERNAL MEDICINE

## 2025-06-25 PROCEDURE — 80053 COMPREHEN METABOLIC PANEL: CPT | Performed by: INTERNAL MEDICINE

## 2025-06-25 PROCEDURE — 63710000001 INSULIN LISPRO (HUMAN) PER 5 UNITS: Performed by: INTERNAL MEDICINE

## 2025-06-25 PROCEDURE — 83735 ASSAY OF MAGNESIUM: CPT | Performed by: INTERNAL MEDICINE

## 2025-06-25 PROCEDURE — 84100 ASSAY OF PHOSPHORUS: CPT | Performed by: INTERNAL MEDICINE

## 2025-06-25 PROCEDURE — 94799 UNLISTED PULMONARY SVC/PX: CPT

## 2025-06-25 PROCEDURE — 94664 DEMO&/EVAL PT USE INHALER: CPT

## 2025-06-25 PROCEDURE — 25010000002 CEFEPIME PER 500 MG: Performed by: INTERNAL MEDICINE

## 2025-06-25 PROCEDURE — 97165 OT EVAL LOW COMPLEX 30 MIN: CPT

## 2025-06-25 PROCEDURE — 82948 REAGENT STRIP/BLOOD GLUCOSE: CPT | Performed by: INTERNAL MEDICINE

## 2025-06-25 PROCEDURE — 94760 N-INVAS EAR/PLS OXIMETRY 1: CPT

## 2025-06-25 RX ORDER — ACYCLOVIR 200 MG/1
400 CAPSULE ORAL 3 TIMES DAILY
Status: DISCONTINUED | OUTPATIENT
Start: 2025-06-25 | End: 2025-06-27 | Stop reason: HOSPADM

## 2025-06-25 RX ORDER — HYDROMORPHONE HYDROCHLORIDE 2 MG/1
2 TABLET ORAL
Refills: 0 | Status: DISCONTINUED | OUTPATIENT
Start: 2025-06-25 | End: 2025-06-27 | Stop reason: HOSPADM

## 2025-06-25 RX ORDER — MIDODRINE HYDROCHLORIDE 5 MG/1
5 TABLET ORAL
Status: DISCONTINUED | OUTPATIENT
Start: 2025-06-25 | End: 2025-06-26

## 2025-06-25 RX ADMIN — SODIUM CHLORIDE, POTASSIUM CHLORIDE, SODIUM LACTATE AND CALCIUM CHLORIDE 125 ML/HR: 600; 310; 30; 20 INJECTION, SOLUTION INTRAVENOUS at 07:53

## 2025-06-25 RX ADMIN — BUDESONIDE 0.5 MG: 0.5 SUSPENSION RESPIRATORY (INHALATION) at 19:52

## 2025-06-25 RX ADMIN — Medication 10 ML: at 22:09

## 2025-06-25 RX ADMIN — Medication 10 ML: at 09:47

## 2025-06-25 RX ADMIN — INSULIN LISPRO 2 UNITS: 100 INJECTION, SOLUTION INTRAVENOUS; SUBCUTANEOUS at 17:53

## 2025-06-25 RX ADMIN — INSULIN LISPRO 3 UNITS: 100 INJECTION, SOLUTION INTRAVENOUS; SUBCUTANEOUS at 11:29

## 2025-06-25 RX ADMIN — ALPRAZOLAM 0.25 MG: 0.25 TABLET ORAL at 09:46

## 2025-06-25 RX ADMIN — ARFORMOTEROL TARTRATE 15 MCG: 15 SOLUTION RESPIRATORY (INHALATION) at 19:52

## 2025-06-25 RX ADMIN — ALPRAZOLAM 0.25 MG: 0.25 TABLET ORAL at 22:09

## 2025-06-25 RX ADMIN — CEFEPIME 2000 MG: 2 INJECTION, POWDER, FOR SOLUTION INTRAVENOUS at 09:46

## 2025-06-25 RX ADMIN — BUDESONIDE 0.5 MG: 0.5 SUSPENSION RESPIRATORY (INHALATION) at 07:30

## 2025-06-25 RX ADMIN — HEPARIN SODIUM 5000 UNITS: 5000 INJECTION INTRAVENOUS; SUBCUTANEOUS at 13:39

## 2025-06-25 RX ADMIN — HYDROCODONE BITARTRATE AND ACETAMINOPHEN 1 TABLET: 10; 325 TABLET ORAL at 03:04

## 2025-06-25 RX ADMIN — ARFORMOTEROL TARTRATE 15 MCG: 15 SOLUTION RESPIRATORY (INHALATION) at 07:30

## 2025-06-25 RX ADMIN — CEFEPIME 2000 MG: 2 INJECTION, POWDER, FOR SOLUTION INTRAVENOUS at 17:53

## 2025-06-25 RX ADMIN — MIDODRINE HYDROCHLORIDE 5 MG: 5 TABLET ORAL at 17:53

## 2025-06-25 RX ADMIN — CEFEPIME 2000 MG: 2 INJECTION, POWDER, FOR SOLUTION INTRAVENOUS at 00:41

## 2025-06-25 RX ADMIN — FAMOTIDINE 40 MG: 20 TABLET, FILM COATED ORAL at 09:46

## 2025-06-25 RX ADMIN — HYDROCODONE BITARTRATE AND ACETAMINOPHEN 1 TABLET: 10; 325 TABLET ORAL at 18:39

## 2025-06-25 NOTE — PLAN OF CARE
Goal Outcome Evaluation:  Plan of Care Reviewed With: patient        Progress: no change (first session for evaluation)  Outcome Evaluation: Patient presents with limitations of balance, strength, endurance/ activity tolerance and cognition/ safety awareness which are impacting ADL/ transfer independence. Skilled OT services are indicated to remediate/ compensate for deficits to maximize independence and safety with functional ADL tasks.    Anticipated Discharge Disposition (OT): sub acute care setting

## 2025-06-25 NOTE — PROGRESS NOTES
[facility]   Hospitalist Progress Note  Date: 2025  Patient Name: Isha Llanes  : 1965  MRN: [id number]  Date of admission: 2025    Subjective   Subjective     Chief Complaint:   Sepsis    Summary:   Isha Llanes is a 59 y.o. female with a past medical history significant for CHF, COPD, anaplastic lymphoma, non-small cell lung cancer, presents to the hospital with shortness of breath, tachycardia, upon inspection patient has pretty severe lower extremity cellulitis, she was tachycardic, she had a leukocytosis, she had borderline blood pressures, patient was given IV fluids, patient was started on cefepime.  Patient is being admitted to the hospital for treatment of her sepsis     Interval Followup:   Over the past 24 hours patient's blood pressure has dropped with opioid pain medication, patient found to have open area of shingles on her back, patient's heart rate has improved with IV fluids, very drowsy and difficult to arouse, when patient does arouse she is requesting pain medication    Objective   Objective     Vitals:   Temp:  [97.9 °F (36.6 °C)-99 °F (37.2 °C)] 97.9 °F (36.6 °C)  Heart Rate:  [101-128] 114  Resp:  [16-20] 20  BP: ()/(53-73) 91/67  Flow (L/min) (Oxygen Therapy):  [2] 2    Physical Exam   GEN: No acute distress, very drowsy  HEENT: Moist mucous membranes  LUNGS: Equal chest rise bilaterally  CARDIAC: Regular rate and rhythm  NEURO: Moving all 4 extremities spontaneously  SKIN: Concerned area for shingles on back    Result Review    Result Review:  I have personally reviewed the results as below  [x]  Laboratory CBC, CMP personally reviewed  CBC          6/10/2025    10:21 2025    08:50 2025    06:49   CBC   WBC 6.42  17.42  6.92    RBC 5.19  4.88  4.37    Hemoglobin 12.9  12.1  10.8    Hematocrit 42.2  40.4  37.1    MCV 81.3  82.8  84.9    MCH 24.9  24.8  24.7    MCHC 30.6  30.0  29.1    RDW 14.7  15.4  15.3    Platelets 173  251  151      CMP           6/10/2025    10:21 6/24/2025    08:50 6/25/2025    06:49   CMP   Glucose 239  267  143    BUN 11.5  9.2  7.1    Creatinine 0.43  0.44  0.23    EGFR 112.2  111.6  130.5    Sodium 131  131  133    Potassium 4.1  4.4  3.6    Chloride 94  92  99    Calcium 8.5  8.9  8.0    Total Protein 6.1  6.4  5.1    Albumin 2.9  2.8  2.0    Globulin  3.6  3.1    Total Bilirubin 0.6  0.4  0.2    Alkaline Phosphatase 106  146  103    AST (SGOT) 12  16  13    ALT (SGPT) 13  9  7    Albumin/Globulin Ratio  0.8  0.6    BUN/Creatinine Ratio 26.7  20.9  30.9    Anion Gap 10.0  13.7  9.0      []  Microbiology  []  Radiology  []  EKG/Telemetry   []  Cardiology/Vascular   []  Pathology  []  Old records  []  Other:    Scheduled medications:  ALPRAZolam, 0.25 mg, Oral, BID  arformoterol, 15 mcg, Nebulization, BID - RT  budesonide, 0.5 mg, Nebulization, BID - RT  cefepime, 2,000 mg, Intravenous, Q8H  famotidine, 40 mg, Oral, Daily  heparin (porcine), 5,000 Units, Subcutaneous, Q8H  insulin lispro, 2-7 Units, Subcutaneous, 4x Daily AC & at Bedtime  melatonin, 5 mg, Oral, Nightly  multivitamin with minerals, 1 tablet, Oral, Daily  PARoxetine, 20 mg, Oral, QAM  sodium chloride, 10 mL, Intravenous, Q12H      As needed medications:    senna-docusate sodium **AND** polyethylene glycol **AND** bisacodyl **AND** bisacodyl    dextrose    dextrose    glucagon (human recombinant)    HYDROcodone-acetaminophen    ipratropium-albuterol    nitroglycerin    ondansetron    Pharmacy Consult    sodium chloride    sodium chloride    sodium chloride  Infusions:  lactated ringers, 125 mL/hr, Last Rate: 125 mL/hr (06/25/25 0753)  Pharmacy Consult,            Assessment & Plan   Assessment / Plan     Assessment:  Sepsis  Lower extremity cellulitis  Tachycardia  Anaplastic lymphoma  Lung cancer  Anxiety  COPD without acute exacerbation  Diabetes mellitus  GERD  Compression fractures of the lumbar spine  History of CHF not in acute exacerbation  Aortic  stenosis  Possible UTI     Plan:  Patient admitted to the hospital for further workup and management of above  Patient found to meet sepsis criteria, received sepsis fluids in the ED, blood pressure did improve but drops with pain medication  Completing IV fluids today  Continue cefepime for treatment of her lower extremity cellulitis  Add acyclovir for active shingles  Blood cultures ordered and pending  Urinalysis reviewed, trace bacteria trace leukocytes  Continue gentle IV hydration monitoring volume status and respiratory status closely  Hold diuretics currently, monitor volume status and respiratory status closely  Repeat lactate improving  Blood cultures no growth to date  Lower extremity wound culture growing gram-negative bacilli  Home pain medications ordered however patient's blood pressure too low to receive at this time and when patient was seen was very drowsy  Consult patient's oncologist today to help clarify goals of care and to see if she remains a candidate for any further treatment  Continue Ml Parks DuoNebs  CBC, CMP reviewed 6/25/2025   Repeat CBC, CMP, mag and Phos in a.m.  Clinical course will dictate further management     Reviewed patient's labs and imaging, and discussed with patient and nurse at bedside.    VTE Prophylaxis:  Pharmacologic VTE prophylaxis orders are present.    Time spent: Time spent involving patient care including face-to-face encounter 52 minutes    CODE STATUS:   Code Status (Patient has no pulse and is not breathing): CPR (Attempt to Resuscitate)  Medical Interventions (Patient has pulse or is breathing): Full Support      Electronically signed by Bull Davila MD, 6/25/2025, 11:09 EDT.

## 2025-06-25 NOTE — CONSULTS
[facility]   Consult Note    Patient Name: Isha Llanes  : 1965  MRN: [id number]  Primary Care Physician:  Virgil Salas MD  Date of admission: 2025    Subjective   Subjective     Reason for Consult: Patient with anaplastic large cell lymphoma admitted because of cellulitis, and states that her primary care physician was trying to get management of the cellulitis however could not get anyone to give her wound care she therefore has gotten worsening of cellulitis and was brought to the emergency room where she was found to have leukocytosis has been started on antibiotics white counts are already improving, patient initially was diagnosed to have about 20 years ago with Hodgkin's lymphoma was in remission subsequently 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 has been in remission has got some deformities because of that also problems with the pain management patient also keeps on smoking as well at this time she was found to have an a plastic large cell lymphoma has been seen at the [facility] for opinion and patient is to be started on Brentuximab, he is still waiting to get a preauthorization from the insurance, PET scan was done and it shows some progression of the disease and patient really needs to be started on treatment as soon as we have the preauthorization completed, this time patient needs to be treated with antibiotics and we will try to get some home health to get her management of cellulitis with wound care    HPI: Patient with anaplastic large cell lymphoma to be started on chemotherapy,I have discussed with her daughter in detail regarding the management with the as for the management of the lymphoma and findings on the PET scan    Isha Llanes is a 59 y.o. female Patient had history of multiple malignancies has cellulitis of the leg because of chronic venous insufficiency    Review of Systems   All systems were reviewed and  negative except for: Has been reviewed    Personal History     Past Medical History:   Diagnosis Date    Anesthesia     REPORTS HAS GOT REAL EMOTIONAL AND HAS BECOME ANGRY BEFORE    Anxiety     Aortic stenosis, severe     HAS BIO VALVE REPLACED    Arthritis     Asthma     Back pain     Blood clotting disorder     test to find out what type    Cancer     CHF (congestive heart failure)     Chronic low back pain with sciatica     Chronic pain disorder     COPD (chronic obstructive pulmonary disease)     Coronary artery disease     looking to follow with Cardio    Diabetes mellitus     TYPE 2    Dyspnea on effort     Fatty liver     GERD (gastroesophageal reflux disease)     H/O lumpectomy     H/O: hysterectomy     Hiatal hernia     History of degenerative disc disease     History of kidney stones     History of pulmonary embolus (PE)     History of transfusion     AS A CHILD NO TRANSFUSION REACTION    Hodgkin's lymphoma     5/19/23  5 YEARS SINCE LAST CHEMO TX    Hypertension     Immobility     Lupus     Nausea vomiting and diarrhea 03/07/2024    Panic disorder     Pelvic pain     Peripheral neuropathy     Personal history of DVT (deep vein thrombosis)     over 20 years    PONV (postoperative nausea and vomiting)     Presence of intrathecal pump     PTSD (post-traumatic stress disorder)     Sacroiliac joint disease     SI (sacroiliac) joint inflammation     Venous insufficiency        Past Surgical History:   Procedure Laterality Date    ABDOMINAL SURGERY      BACK SURGERY      SPINAL FUSION     BACK SURGERY      BLADDER REPAIR      BRONCHOSCOPY WITH ION ROBOTIC ASSIST N/A 4/22/2025    Procedure: BRONCHOSCOPY WITH ION ROBOT: REBUS, EBUS, NEEDLE ASPIRATES, BIOPSIES, BRUSHINGS, BAL, WASHINGS: insertion of lighted robot navigated instrument to view inside the lung;  Surgeon: Enzo Segal MD;  Location: [facility];  Service: Robotics - Pulmonary;  Laterality: N/A;    CARDIAC CATHETERIZATION N/A 03/06/2019     Procedure: Valvuloplasty;  Surgeon: Wayne Johnson MD;  Location: [facility] INVASIVE LOCATION;  Service: Cardiology    CARDIAC CATHETERIZATION      CARDIAC CATHETERIZATION Left 05/31/2023    Procedure: Cardiac Catheterization/Vascular Study;  Surgeon: Poncho Reid MD;  Location: [facility] CATH INVASIVE LOCATION;  Service: Cardiovascular;  Laterality: Left;    CARDIAC SURGERY      Mechanical valve    CARDIAC VALVE REPLACEMENT  2021    CHOLECYSTECTOMY      COLONOSCOPY N/A 12/29/2021    Procedure: COLONOSCOPY;  Surgeon: Karine Orlando MD;  Location: [facility] ENDOSCOPY;  Service: Gastroenterology;  Laterality: N/A;  POOR PREP    COLONOSCOPY N/A 04/13/2022    Procedure: COLONOSCOPY WITH POLYPECTOMY;  Surgeon: Karine Orlando MD;  Location: [facility] ENDOSCOPY;  Service: Gastroenterology;  Laterality: N/A;  COLON POLYP, HEMORRHOIDS, POOR PREP    COLONOSCOPY      DIRECT LARYNGOSCOPY, ESOPHAGOSCOPY, BRONCHOSCOPY N/A 05/22/2023    Procedure: DIRECT LARYNGOSCOPY WITH BIOPSIES , ESOPHAGOSCOPY, BRONCHOSCOPY;  Surgeon: Ronnie Mcgarry MD;  Location: [facility] OR [facility];  Service: ENT;  Laterality: N/A;    ENDOSCOPY N/A 12/29/2021    Procedure: ESOPHAGOGASTRODUODENOSCOPY;  Surgeon: Karine Orlando MD;  Location: [facility] ENDOSCOPY;  Service: Gastroenterology;  Laterality: N/A;  ESOPHAGITIS, GASTRITIS, HIATAL HERNIA    ENDOSCOPY      ENDOSCOPY N/A 04/16/2024    Procedure: ESOPHAGOGASTRODUODENOSCOPY WITH BIOPSIES;  Surgeon: Karine Orlando MD;  Location: [facility] ENDOSCOPY;  Service: Gastroenterology;  Laterality: N/A;  ESOPHAGITIS, HIATAL HERNIA    ENDOSCOPY W/ PEG TUBE PLACEMENT N/A 12/10/2024    Procedure: ESOPHAGOGASTRODUODENOSCOPY WITH PERCUTANEOUS ENDOSCOPIC GASTROSTOMY TUBE INSERTION WITH ANESTHESIA;  Surgeon: Rodger Ortega MD;  Location: [facility] ENDOSCOPY;  Service: Gastroenterology;  Laterality: N/A;  PEG TUBE INSERTION    HYSTERECTOMY      LYMPHADENECTOMY      PAIN PUMP  INSERTION/REVISION N/A 10/18/2019    Procedure: PAIN PUMP INSERTION [facility] 10-18-19 PEDRO;  Surgeon: Horace Rios MD;  Location: [facility] OR;  Service: Pain Management    PAIN PUMP INSERTION/REVISION N/A 11/23/2020    Procedure: pain pump removal;  Surgeon: Horace Rios MD;  Location: [facility] OR;  Service: Pain Management;  Laterality: N/A;    PEG TUBE INSERTION N/A 07/26/2023    Procedure: PERCUTANEOUS ENDOSCOPIC GASTROSTOMY TUBE INSERTION;  Surgeon: Kody Mercer MD;  Location: [facility] ENDOSCOPY;  Service: General;  Laterality: N/A;  SUCCESSFUL PEG TUBE PLACE PLACEMENT    PORTACATH PLACEMENT      IN LEFT CHEST REPORTS THAT IT IS NOT FUNCTIONING    SKIN BIOPSY      TONSILLECTOMY      TUBAL ABDOMINAL LIGATION      TUMOR REMOVAL      vaginal /anal area       Family History: family history includes ADD / ADHD in her brother, granddaughter, grandson, and nephew; Anxiety disorder in her mother; Bipolar disorder in her sister; Depression in her sister; Heart failure in her mother; Pancreatic cancer in her paternal grandmother and sister; Prostate cancer in her father; Thyroid cancer in her maternal grandmother. Otherwise pertinent FHx was reviewed and not pertinent to current issue.    Social History:  reports that she has been smoking cigarettes. She started smoking about 46 years ago. She has a 93 pack-year smoking history. She has been exposed to tobacco smoke. She has never used smokeless tobacco. She reports that she does not drink alcohol and does not use drugs.    Home Medications:  ALPRAZolam, HYDROcodone-acetaminophen, Insulin Lispro (1 Unit Dial), PARoxetine, budesonide, famotidine, furosemide, ipratropium-albuterol, loperamide, melatonin, metoprolol succinate XL, nystatin, ondansetron ODT, oxyCODONE, pain, predniSONE, and triamcinolone      Allergies:  Allergies   Allergen Reactions    Amoxicillin Shortness Of Breath     Has tolerated Cefazolin, Ceftriaxone, and Cefepime -Jermaine  Melida, RPH    Ceclor [Cefaclor] Shortness Of Breath     Has tolerated Cefazolin, Ceftriaxone, and Cefepime -[facility]      Penicillins Shortness Of Breath     Has tolerated Cefazolin, Ceftriaxone, and Cefepime -[facility]    Sulfa Antibiotics Rash    Valium [Diazepam] Mental Status Change     DEPRESSED    Ambien [Zolpidem] Mental Status Change    Aspirin GI Intolerance    Ativan [Lorazepam] Mental Status Change    Benadryl [Diphenhydramine] Anxiety     AND MAKES HER HYPER    Biaxin [Clarithromycin] Unknown - Low Severity    Cephalexin Unknown - Low Severity    Clindamycin Unknown - Low Severity    Compazine [Prochlorperazine Edisylate] Rash    Contrast Dye (Echo Or Unknown Ct/Mr) Other (See Comments)     Caused pain in her arm    Doxycycline Rash    Nsaids GI Intolerance    Phenergan [Promethazine Hcl] GI Intolerance    Promethazine GI Intolerance    Vancomycin Itching       Objective   Objective     Vitals:   Temp:  [97.3 °F (36.3 °C)-99 °F (37.2 °C)] 97.3 °F (36.3 °C)  Heart Rate:  [101-115] 114  Resp:  [16-20] 20  BP: ()/(53-73) 96/72  Flow (L/min) (Oxygen Therapy):  [2] 2    Physical Exam    Constitutional: Alert and oriented not in acute distress   Eyes: Pupils equal reacting to light and accommodation   HENT: Normocephalic   Neck: No lymphadenopathy   Respiratory: No rales or rhonchi   Cardiovascular: S1-S2 normal no S3 or S4   Gastrointestinal: No palpable organomegaly   Musculoskeletal: No deformities   Neurologic: No localizing signs  Skin: No rash      Result Review    Result Review:  I have personally reviewed the results from the time of this admission to 6/25/2025 13:52 EDT and agree with these findings:  [x]  Laboratory  []  Microbiology  [x]  Radiology  []  EKG/Telemetry   []  Cardiology/Vascular   []  Pathology  []  Old records  []  Other:  Most notable findings include: Have been reviewed and discussed    Assessment & Plan   Assessment / Plan     Brief Patient  Summary:  Isha Llanes is a 59 y.o. female who Patient to be started on chemotherapy for anaplastic large cell lymphoma    Active Hospital Problems:  Active Hospital Problems    Diagnosis     **Cellulitis        Plan:   Antibiotic and wound care        Electronically signed by Teodoro Mancuso MD, 06/25/25, 1:52 PM EDT.    Part of this note may be an electronic transcription/translation of spoken language to printed text using the Dragon Dictation System.

## 2025-06-25 NOTE — NURSING NOTE
Palliative care consult for goals of care. Awaiting oncologist consult before palliative care to see patient. Palliative care will follow up.    Charley CHOE, RN, CHPN  Palliative Care

## 2025-06-25 NOTE — PLAN OF CARE
Goal Outcome Evaluation:  Plan of Care Reviewed With: patient        Progress: no change  Outcome Evaluation: Pt AOx4, 2L NC, frequently seeking staff asking for pain meds, hypotensive after x2, additional education needed regarding pain control and plan of care

## 2025-06-25 NOTE — CONSULTS
Nutrition Services    Patient Name: Isha Llanes  YOB: 1965  MRN: [id number]  Admission date: 6/24/2025      CLINICAL NUTRITION ASSESSMENT      Reason for Assessment  Identified at Risk by Screening Criteria      H&P:  Past Medical History:   Diagnosis Date    Anesthesia     REPORTS HAS GOT REAL EMOTIONAL AND HAS BECOME ANGRY BEFORE    Anxiety     Aortic stenosis, severe     HAS BIO VALVE REPLACED    Arthritis     Asthma     Back pain     Blood clotting disorder     test to find out what type    Cancer     CHF (congestive heart failure)     Chronic low back pain with sciatica     Chronic pain disorder     COPD (chronic obstructive pulmonary disease)     Coronary artery disease     looking to follow with Cardio    Diabetes mellitus     TYPE 2    Dyspnea on effort     Fatty liver     GERD (gastroesophageal reflux disease)     H/O lumpectomy     H/O: hysterectomy     Hiatal hernia     History of degenerative disc disease     History of kidney stones     History of pulmonary embolus (PE)     History of transfusion     AS A CHILD NO TRANSFUSION REACTION    Hodgkin's lymphoma     5/19/23  5 YEARS SINCE LAST CHEMO TX    Hypertension     Immobility     Lupus     Nausea vomiting and diarrhea 03/07/2024    Panic disorder     Pelvic pain     Peripheral neuropathy     Personal history of DVT (deep vein thrombosis)     over 20 years    PONV (postoperative nausea and vomiting)     Presence of intrathecal pump     PTSD (post-traumatic stress disorder)     Sacroiliac joint disease     SI (sacroiliac) joint inflammation     Venous insufficiency         Current Problems:   Active Hospital Problems    Diagnosis     **Cellulitis         Nutrition/Diet History         Narrative   Isha Llanes is a 59 y.o. female with a past medical history significant for CHF, COPD, anaplastic lymphoma, non-small cell lung cancer, presents to the hospital with shortness of breath, tachycardia, upon inspection patient has  "pretty severe lower extremity cellulitis, she was tachycardic, she had a leukocytosis, she had borderline blood pressures, patient was given IV fluids, patient was started on cefepime     Patient known to RD from previous admission. Pt is Aa0x3 upon RD visit, NAD. Patient on the commode upon entry. Reports low intake of morning meal tray due to low appetite, 25% intake. Denies any NVDC.  Denies any desire to use peg tube for feeding for now.       Anthropometrics        Current Height, Weight Height: 160 cm (63\")  Weight: 53.9 kg (118 lb 13.3 oz)   Current BMI Body mass index is 21.05 kg/m².   BMI Classification Normal range   % IBW    Adjusted Body Weight (ABW)    Weight Hx  Wt Readings from Last 30 Encounters:   06/25/25 0539 53.9 kg (118 lb 13.3 oz)   06/24/25 0823 52.4 kg (115 lb 8.3 oz)   06/09/25 0449 52.3 kg (115 lb 4.8 oz)   06/06/25 0542 58.6 kg (129 lb 3 oz)   06/05/25 1744 53.5 kg (117 lb 15.1 oz)   06/05/25 1030 61.8 kg (136 lb 3.9 oz)   05/27/25 1742 56.6 kg (124 lb 12.5 oz)   05/15/25 1332 63.7 kg (140 lb 6.9 oz)   05/04/25 0815 60.8 kg (134 lb 0.6 oz)   04/25/25 1132 59 kg (130 lb)   04/22/25 0634 58.9 kg (129 lb 13.6 oz)   04/10/25 1411 54.9 kg (121 lb)   04/17/25 1523 59 kg (130 lb)   04/09/25 1109 54.9 kg (121 lb)   03/28/25 2000 66.1 kg (145 lb 11.6 oz)   03/28/25 0920 62.4 kg (137 lb 9.6 oz)   03/26/25 1047 62.6 kg (138 lb)   03/18/25 0347 62.7 kg (138 lb 3.7 oz)   03/18/25 0023 63.8 kg (140 lb 10.5 oz)   02/08/25 0602 63.8 kg (140 lb 10.5 oz)   12/23/24 0750 61.3 kg (135 lb 0.5 oz)   12/11/24 0642 58.1 kg (128 lb 1.4 oz)   12/10/24 0541 61 kg (134 lb 7.7 oz)   12/09/24 0531 59.6 kg (131 lb 6.3 oz)   12/07/24 0745 64 kg (141 lb 1.5 oz)   11/07/24 0406 57.7 kg (127 lb 3.3 oz)   11/06/24 2148 59.9 kg (132 lb 0.9 oz)   09/13/24 0943 57.6 kg (127 lb)   08/21/24 1605 65.7 kg (144 lb 13.5 oz)   08/21/24 1421 60.3 kg (133 lb)   08/07/24 0928 60.3 kg (133 lb)   07/20/24 0752 60.5 kg (133 lb 6.1 oz) "   07/02/24 1340 60.5 kg (133 lb 6.1 oz)   06/27/24 0950 46.7 kg (103 lb)   06/13/24 0417 58.6 kg (129 lb 3 oz)   06/12/24 2236 59.3 kg (130 lb 11.7 oz)   06/06/24 1049 48 kg (105 lb 14.4 oz)   05/29/24 2259 62.2 kg (137 lb 2 oz)   05/23/24 1105 47.6 kg (105 lb)   05/09/24 1042 52.2 kg (115 lb)   05/06/24 1038 58 kg (127 lb 14.4 oz)   04/27/24 1041 64.9 kg (143 lb 1.3 oz)          Wt Change Observation      Estimated/Assessed Needs  Estimated Needs based on: Current Body Weight       Energy Requirements 30-35 kcal/kg    EST Needs (kcal/day) 3208-0903 kcals/d       Protein Requirements 1.0-1.2 g/kg   EST Daily Needs (g/day) 54-65 g/d       Fluid Requirements 1 ml/kcal    Estimated Needs (mL/day) 7868-9267      Labs/Medications         Pertinent Labs Reviewed.   Results from last 7 days   Lab Units 06/25/25  0649 06/24/25  0850   SODIUM mmol/L 133* 131*   POTASSIUM mmol/L 3.6 4.4   CHLORIDE mmol/L 99 92*   CO2 mmol/L 25.0 25.3   BUN mg/dL 7.1 9.2   CREATININE mg/dL 0.23* 0.44*   CALCIUM mg/dL 8.0* 8.9   BILIRUBIN mg/dL 0.2 0.4   ALK PHOS U/L 103 146*   ALT (SGPT) U/L 7 9   AST (SGOT) U/L 13 16   GLUCOSE mg/dL 143* 267*     Results from last 7 days   Lab Units 06/25/25  0649 06/24/25  0850   MAGNESIUM mg/dL 1.4* 1.3*   PHOSPHORUS mg/dL 2.7  --    HEMOGLOBIN g/dL 10.8* 12.1   HEMATOCRIT % 37.1 40.4     COVID19   Date Value Ref Range Status   06/24/2025 Not Detected Not Detected - Ref. Range Final     Lab Results   Component Value Date    HGBA1C 5.90 (H) 11/29/2023         Pertinent Medications Reviewed.     Malnutrition Severity Assessment              Nutrition Diagnosis         Nutrition Dx Problem 1 Inadequate oral Intake related to decreased ability to consume sufficient energy as evidenced by decreased appetite.     Nutrition Intervention           Current Nutrition Orders & Evaluation of Intake       Current PO Diet Diet: Regular/House; Fluid Consistency: Thin (IDDSI 0)   Supplement Orders Placed This Encounter       Dietary Nutrition Supplements Boost Glucose Control (Glucerna Shake); vanilla           Nutrition Intervention/Prescription        Continue Diet Rx,   Continue Boost GC 2x/day        Medical Nutrition Therapy/Nutrition Education          Learner     Readiness N/A  N/A     Method     Response N/A  N/A     Monitor/Evaluation        Monitor PO intake, Supplement intake     Nutrition Discharge Plan         To be determined     Electronically signed by:  Renny Seals RD  06/25/25 11:55 EDT

## 2025-06-25 NOTE — SIGNIFICANT NOTE
06/25/25 1250   Coping/Psychosocial   Observed Emotional State anxious   Verbalized Emotional State other (see comments)  (The Pt says she is not feeling well and she says she feels tired.)   Trust Relationship/Rapport empathic listening provided   Involvement in Care interacting with patient   Additional Documentation Spiritual Care (Group)   Spiritual Care   Spiritual Care Visit Type initial   Spiritual Care Source nurse referral   Receptivity to Spiritual Care visit welcomed   Spiritual Care Request prayer support   Spiritual Care Interventions supportive conversation provided;prayer support provided   Response to Spiritual Care receptive of support   Use of Spiritual Resources prayer   Spiritual Care Follow-Up follow-up, none required as presently assessed     Length of visit: 10 min.

## 2025-06-25 NOTE — CONSULTS
The Pt says she feels tired and she doesn't feel well. She wants pray but she falls asleep while we pray. Very good attitude.

## 2025-06-25 NOTE — THERAPY EVALUATION
Patient Name: Isha Llanes  : 1965    MRN: [id number]                              Today's Date: 2025       Admit Date: 2025    Visit Dx:     ICD-10-CM ICD-9-CM   1. Sepsis, due to unspecified organism, unspecified whether acute organ dysfunction present  A41.9 038.9     995.91   2. Left leg cellulitis  L03.116 682.6   3. Decreased activities of daily living (ADL)  Z78.9 V49.89     Patient Active Problem List   Diagnosis    Septic shock    Acute MI    Cancer associated pain    Presence of intrathecal pump    Chronic low back pain with bilateral sciatica    Sacroiliac inflammation    Chronic pain syndrome    Pelvic pain    Aortic stenosis, severe    Dyspnea on effort    Other pulmonary embolism without acute cor pulmonale    Nonrheumatic aortic valve stenosis    Hip pain    Anxiety disorder    Allergic rhinitis due to allergen    Arthritis    Asthma    Kidney disease    CHF (congestive heart failure)    Chronic obstructive pulmonary disease    Diabetes mellitus, type 2    Depression    GERD (gastroesophageal reflux disease)    S/P TAVR (transcatheter aortic valve replacement)    Limited mobility    Venous hypertension of both lower extremities    Nicotine dependence    MVP (mitral valve prolapse)    Lupus (systemic lupus erythematosus)    Long term current use of anticoagulant therapy    History of Hodgkin's lymphoma    Hepatic steatosis    Heart murmur    Cellulitis of lower extremity    CAD (coronary artery disease)    Non-healing wound of lower extremity, subsequent encounter    MSSA (methicillin susceptible Staphylococcus aureus) infection    Sepsis    Change in bowel habits    Nausea    Venous stasis ulcer of right calf    Venous stasis ulcer of left calf    Noncompliance    Peripheral vascular disease    Obesity with body mass index 30 or greater    Neurologic disorder    Neck pain    Limb pain    Hiatal hernia    Bladder disorder    Frailty    Cellulitis of right foot    Bowel disease     Atrophy of skin    Breast lump    Encounter for care related to vascular access port    Primary osteoarthritis of right hip    Tobacco abuse    Squamous cell carcinoma of larynx    Throat pain    Voice hoarseness    Hodgkin lymphoma    Malignant neoplasm of supraglottis    Dysphagia    Nausea vomiting and diarrhea    Weight loss, abnormal    Epigastric pain    Nausea and vomiting    History of laryngeal cancer    Anorexia    AMS (altered mental status)    Acute on chronic respiratory failure with hypoxia and hypercapnia    UTI (urinary tract infection)    Anterior neck pain    COVID-19 virus infection    Hypoxemia    Generalized weakness    Chronic respiratory failure with hypoxia and hypercapnia    Acute on chronic respiratory failure with hypercapnia    Intractable nausea and vomiting    Abnormal chest CT    Lung nodule    Hypercapnia    COPD exacerbation    Cellulitis     Past Medical History:   Diagnosis Date    Anesthesia     REPORTS HAS GOT REAL EMOTIONAL AND HAS BECOME ANGRY BEFORE    Anxiety     Aortic stenosis, severe     HAS BIO VALVE REPLACED    Arthritis     Asthma     Back pain     Blood clotting disorder     test to find out what type    Cancer     CHF (congestive heart failure)     Chronic low back pain with sciatica     Chronic pain disorder     COPD (chronic obstructive pulmonary disease)     Coronary artery disease     looking to follow with Cardio    Diabetes mellitus     TYPE 2    Dyspnea on effort     Fatty liver     GERD (gastroesophageal reflux disease)     H/O lumpectomy     H/O: hysterectomy     Hiatal hernia     History of degenerative disc disease     History of kidney stones     History of pulmonary embolus (PE)     History of transfusion     AS A CHILD NO TRANSFUSION REACTION    Hodgkin's lymphoma     5/19/23  5 YEARS SINCE LAST CHEMO TX    Hypertension     Immobility     Lupus     Nausea vomiting and diarrhea 03/07/2024    Panic disorder     Pelvic pain     Peripheral neuropathy      Personal history of DVT (deep vein thrombosis)     over 20 years    PONV (postoperative nausea and vomiting)     Presence of intrathecal pump     PTSD (post-traumatic stress disorder)     Sacroiliac joint disease     SI (sacroiliac) joint inflammation     Venous insufficiency      Past Surgical History:   Procedure Laterality Date    ABDOMINAL SURGERY      BACK SURGERY      SPINAL FUSION     BACK SURGERY      BLADDER REPAIR      BRONCHOSCOPY WITH ION ROBOTIC ASSIST N/A 4/22/2025    Procedure: BRONCHOSCOPY WITH ION ROBOT: REBUS, EBUS, NEEDLE ASPIRATES, BIOPSIES, BRUSHINGS, BAL, WASHINGS: insertion of lighted robot navigated instrument to view inside the lung;  Surgeon: Enzo Segal MD;  Location: [facility] MAIN OR;  Service: Robotics - Pulmonary;  Laterality: N/A;    CARDIAC CATHETERIZATION N/A 03/06/2019    Procedure: Valvuloplasty;  Surgeon: Wayne Johnson MD;  Location: [facility] CATH INVASIVE LOCATION;  Service: Cardiology    CARDIAC CATHETERIZATION      CARDIAC CATHETERIZATION Left 05/31/2023    Procedure: Cardiac Catheterization/Vascular Study;  Surgeon: Poncho Reid MD;  Location: [facility] CATH INVASIVE LOCATION;  Service: Cardiovascular;  Laterality: Left;    CARDIAC SURGERY      Mechanical valve    CARDIAC VALVE REPLACEMENT  2021    CHOLECYSTECTOMY      COLONOSCOPY N/A 12/29/2021    Procedure: COLONOSCOPY;  Surgeon: Karine Orlando MD;  Location: [facility] ENDOSCOPY;  Service: Gastroenterology;  Laterality: N/A;  POOR PREP    COLONOSCOPY N/A 04/13/2022    Procedure: COLONOSCOPY WITH POLYPECTOMY;  Surgeon: Karine Orlando MD;  Location: [facility] ENDOSCOPY;  Service: Gastroenterology;  Laterality: N/A;  COLON POLYP, HEMORRHOIDS, POOR PREP    COLONOSCOPY      DIRECT LARYNGOSCOPY, ESOPHAGOSCOPY, BRONCHOSCOPY N/A 05/22/2023    Procedure: DIRECT LARYNGOSCOPY WITH BIOPSIES , ESOPHAGOSCOPY, BRONCHOSCOPY;  Surgeon: Ronnie Mcgarry MD;  Location: [facility] OR OSC;  Service: ENT;  Laterality:  N/A;    ENDOSCOPY N/A 12/29/2021    Procedure: ESOPHAGOGASTRODUODENOSCOPY;  Surgeon: Karine Orlando MD;  Location: [facility] ENDOSCOPY;  Service: Gastroenterology;  Laterality: N/A;  ESOPHAGITIS, GASTRITIS, HIATAL HERNIA    ENDOSCOPY      ENDOSCOPY N/A 04/16/2024    Procedure: ESOPHAGOGASTRODUODENOSCOPY WITH BIOPSIES;  Surgeon: Karine Orlando MD;  Location: [facility] ENDOSCOPY;  Service: Gastroenterology;  Laterality: N/A;  ESOPHAGITIS, HIATAL HERNIA    ENDOSCOPY W/ PEG TUBE PLACEMENT N/A 12/10/2024    Procedure: ESOPHAGOGASTRODUODENOSCOPY WITH PERCUTANEOUS ENDOSCOPIC GASTROSTOMY TUBE INSERTION WITH ANESTHESIA;  Surgeon: Rodger Ortega MD;  Location: [facility] ENDOSCOPY;  Service: Gastroenterology;  Laterality: N/A;  PEG TUBE INSERTION    HYSTERECTOMY      LYMPHADENECTOMY      PAIN PUMP INSERTION/REVISION N/A 10/18/2019    Procedure: PAIN PUMP INSERTION \[facility] [place] [facility]\"" 10-18-19 [place];  Surgeon: Horace Rios MD;  Location: [facility];  Service: Pain Management    PAIN PUMP INSERTION/REVISION N/A 11/23/2020    Procedure: pain pump removal;  Surgeon: Horace Rios MD;  Location: [facility] OR;  Service: Pain Management;  Laterality: N/A;    PEG TUBE INSERTION N/A 07/26/2023    Procedure: PERCUTANEOUS ENDOSCOPIC GASTROSTOMY TUBE INSERTION;  Surgeon: Kody Mercer MD;  Location: [facility] ENDOSCOPY;  Service: General;  Laterality: N/A;  SUCCESSFUL PEG TUBE PLACE PLACEMENT    PORTACATH PLACEMENT      IN LEFT CHEST REPORTS THAT IT IS NOT FUNCTIONING    SKIN BIOPSY      TONSILLECTOMY      TUBAL ABDOMINAL LIGATION      TUMOR REMOVAL      vaginal /anal area      General Information       Row Name 06/25/25 1050          OT Time and Intention    Document Type evaluation  -AV     Mode of Treatment individual therapy;occupational therapy  -AV       Row Name 06/25/25 1050          General Information    Patient Profile Reviewed yes  -AV     Prior Level of Function independent:;ADL's;transfer  sits to  "bathe (seated edge of tub). sits to groom: \"I can't stand\". uses wheelchair for mobility/ independent transfers. continuous O2 with pt uncertain of amount.  -AV     Existing Precautions/Restrictions fall;oxygen therapy device and L/min  impaired skin integrity  -AV     Barriers to Rehab cognitive status  -AV       Row Name 06/25/25 1050          Occupational Profile    Reason for Services/Referral (Occupational Profile) Patient is a 59 year old female [facility] with shortness of air. Noted she was recently hospitalized on 6/5/25 and left AMA 6/22/25. She is currently on 4th floor/ 2L O2 with sats 100%. OT consulted due to recent decline in ADL/ transfer independence. No previous OT services for current condition.  -AV       Row Name 06/25/25 1050          Living Environment    People in Home --  daughter  -AV       Row Name 06/25/25 1050          Home Main Entrance    Number of Stairs, Main Entrance none  -AV       Row Name 06/25/25 1050          Stairs Within Home, Primary    Number of Stairs, Within Home, Primary none  -AV       Row Name 06/25/25 1050          Cognition    Orientation Status (Cognition) --  drowsy: required moderate cues for arousal throughout evaluation. able to follow commands with some repeated direction required. questionable safety awareness.  -AV               User Key  (r) = Recorded By, (t) = Taken By, (c) = Cosigned By      Initials Name Provider Type    Dontae Gill, OT Occupational Therapist                     Mobility/ADL's       Row Name 06/25/25 1055          Transfers    Comment, (Transfers) pt declined all transfers. assist of 1-2 per nursing report.  -AV       Row Name 06/25/25 1055          Activities of Daily Living    BADL Assessment/Intervention --  Independent feeding with setup. Max assist further ADLs.  -AV               User Key  (r) = Recorded By, (t) = Taken By, (c) = Cosigned By      Initials Name Provider Type    CRISTIAN Vilalgomez" "JOHANNE Diggs Occupational Therapist                   Obj/Interventions       Row Name 06/25/25 1055          Sensory Assessment (Somatosensory)    Sensory Assessment (Somatosensory) UE sensation intact  -AV     Sensory Assessment sensory awareness to light touch  -AV       Row Name 06/25/25 1055          Vision Assessment/Intervention    Visual Impairment/Limitations WFL  -AV     Vision Assessment Comment able to retrieve ADL objects from bedside table accurately  -AV       Row Name 06/25/25 1055          Range of Motion Comprehensive    General Range of Motion bilateral upper extremity ROM WFL  -AV     Comment, General Range of Motion AAROM with inconsistent effort  -AV       Row Name 06/25/25 1055          Strength Comprehensive (MMT)    Comment, General Manual Muscle Testing (MMT) Assessment 3+/5 to 4(-)/5 bilateral biceps, triceps and   -AV       Row Name 06/25/25 1055          Motor Skills    Motor Skills coordination;functional endurance  -AV     Coordination --  right dominant  -AV     Functional Endurance poor plus  -AV       Row Name 06/25/25 1055          Balance    Comment, Balance declined testing \"I can't stand\"  -AV               User Key  (r) = Recorded By, (t) = Taken By, (c) = Cosigned By      Initials Name Provider Type    AV Dontae Villagomez OT Occupational Therapist                   Goals/Plan       Row Name 06/25/25 1059          Transfer Goal 1 (OT)    Activity/Assistive Device (Transfer Goal 1, OT) transfers, all;wheelchair transfer  -AV     [place] Level/Cues Needed (Transfer Goal 1, OT) supervision required;verbal cues required  -AV     Time Frame (Transfer Goal 1, OT) long term goal (LTG);10 days  -AV       Row Name 06/25/25 1059          Bathing Goal 1 (OT)    Activity/Device (Bathing Goal 1, OT) bathing skills, all  -AV     [place] Level/Cues Needed (Bathing Goal 1, OT) supervision required;set-up required;verbal cues required  -AV     Time Frame (Bathing Goal 1, OT) long " term goal (LTG);10 days  -AV       Row Name 06/25/25 1059          Dressing Goal 1 (OT)    Activity/Device (Dressing Goal 1, OT) dressing skills, all  -AV     [place]/Cues Needed (Dressing Goal 1, OT) supervision required;set-up required;verbal cues required  -AV     Time Frame (Dressing Goal 1, OT) long term goal (LTG);10 days  -AV       Row Name 06/25/25 1059          Toileting Goal 1 (OT)    Activity/Device (Toileting Goal 1, OT) toileting skills, all;raised toilet seat  -AV     [place] Level/Cues Needed (Toileting Goal 1, OT) supervision required;set-up required;verbal cues required  -AV     Time Frame (Toileting Goal 1, OT) long term goal (LTG);10 days  -AV       Row Name 06/25/25 1059          Grooming Goal 1 (OT)    Activity/Device (Grooming Goal 1, OT) grooming skills, all  -AV     [place] (Grooming Goal 1, OT) supervision required;set-up required;verbal cues required  -AV     Time Frame (Grooming Goal 1, OT) long term goal (LTG);10 days  -AV       Row Name 06/25/25 1059          Strength Goal 1 (OT)    Strength Goal 1 (OT) Patient will demonstrate 4/5 bilateral biceps, triceps and  to increase ADL/ transfer independence.  -AV     Time Frame (Strength Goal 1, OT) long term goal (LTG);10 days  -AV       Row Name 06/25/25 1059          Problem Specific Goal 1 (OT)    Problem Specific Goal 1 (OT) Patient will demonstrate fair endurance/activity tolerance needed to support ADLs.  -AV     Time Frame (Problem Specific Goal 1, OT) long term goal (LTG);10 days  -AV       Row Name 06/25/25 1059          Therapy Assessment/Plan (OT)    Planned Therapy Interventions (OT) activity tolerance training;BADL retraining;functional balance retraining;occupation/activity based interventions;patient/caregiver education/training;ROM/therapeutic exercise;strengthening exercise;transfer/mobility retraining  -AV               User Key  (r) = Recorded By, (t) = Taken By, (c) = Cosigned By      Initials Name  "Provider Type    Dontae Gill OT Occupational Therapist                   Clinical Impression       Row Name 06/25/25 1057          Pain Assessment    Pretreatment Pain Rating 9/10  -AV     Posttreatment Pain Rating 9/10  -AV     Pain Location --  \"my kidneys\"  -AV     Pain Management Interventions nursing notified  -AV       Row Name 06/25/25 1057          Plan of Care Review    Plan of Care Reviewed With patient  -AV     Progress no change  first session for evaluation  -AV     Outcome Evaluation Patient presents with limitations of balance, strength, endurance/ activity tolerance and cognition/ safety awareness which are impacting ADL/ transfer independence. Skilled OT services are indicated to remediate/ compensate for deficits to maximize independence and safety with functional ADL tasks.  -AV       Row Name 06/25/25 1057          Therapy Assessment/Plan (OT)    Patient/Family Therapy Goal Statement (OT) none stated  -AV     Rehab Potential (OT) fair  -AV     Criteria for Skilled Therapeutic Interventions Met (OT) yes;meets criteria;skilled treatment is necessary  -AV     Therapy Frequency (OT) 5 times/wk  -AV       Row Name 06/25/25 1057          Therapy Plan Review/Discharge Plan (OT)    Anticipated Discharge Disposition (OT) sub acute care setting  -AV       Row Name 06/25/25 1057          Vital Signs    O2 Delivery Pre Treatment nasal cannula  -AV     O2 Delivery Intra Treatment nasal cannula  -AV     O2 Delivery Post Treatment nasal cannula  -AV       Row Name 06/25/25 1057          Positioning and Restraints    Pre-Treatment Position in bed  -AV     Post Treatment Position bed  -AV     In Bed call light within reach;encouraged to call for assist;exit alarm on  -AV               User Key  (r) = Recorded By, (t) = Taken By, (c) = Cosigned By      Initials Name Provider Type    AV Dontae Villagomez OT Occupational Therapist                   Outcome Measures       Row Name 06/25/25 1101          How much " help from another is currently needed...    Putting on and taking off regular lower body clothing? 2  -AV     Bathing (including washing, rinsing, and drying) 2  -AV     Toileting (which includes using toilet bed pan or urinal) 1  -AV     Putting on and taking off regular upper body clothing 2  -AV     Taking care of personal grooming (such as brushing teeth) 2  -AV     Eating meals 4  -AV     AM-PAC 6 Clicks Score (OT) 13  -AV       Row Name 06/25/25 1101          Functional Assessment    Outcome Measure Options AM-PAC 6 Clicks Daily Activity (OT);Optimal Instrument  -AV       Row Name 06/25/25 1101          Optimal Instrument    Optimal Instrument Optimal - 3  -AV     Bending/Stooping 5  -AV     Standing 5  -AV     Reaching 2  -AV     From the list, choose the 3 activities you would most like to be able to do without any difficulty Bending/stooping;Standing;Reaching  -AV     Total Score Optimal - 3 12  -AV               User Key  (r) = Recorded By, (t) = Taken By, (c) = Cosigned By      Initials Name Provider Type    Dontae Gill OT Occupational Therapist                    Occupational Therapy Education       Title: PT OT SLP Therapies (Done)       Topic: Occupational Therapy (Done)       Point: ADL training (Done)       Learning Progress Summary            Patient Acceptance, E, VU by  at 6/25/2025 1101                      Point: Home exercise program (Done)       Learning Progress Summary            Patient Acceptance, E, VU by  at 6/25/2025 1101                      Point: Precautions (Done)       Learning Progress Summary            Patient Acceptance, E, VU by AV at 6/25/2025 1101                      Point: Body mechanics (Done)       Learning Progress Summary            Patient Acceptance, E, VU by  at 6/25/2025 1101                                      User Key       Initials Effective Dates Name Provider Type Discipline     06/16/21 -  Dontae Villagomez OT Occupational Therapist OT                   OT Recommendation and Plan  Planned Therapy Interventions (OT): activity tolerance training, BADL retraining, functional balance retraining, occupation/activity based interventions, patient/caregiver education/training, ROM/therapeutic exercise, strengthening exercise, transfer/mobility retraining  Therapy Frequency (OT): 5 times/wk  Plan of Care Review  Plan of Care Reviewed With: patient  Progress: no change (first session for evaluation)  Outcome Evaluation: Patient presents with limitations of balance, strength, endurance/ activity tolerance and cognition/ safety awareness which are impacting ADL/ transfer independence. Skilled OT services are indicated to remediate/ compensate for deficits to maximize independence and safety with functional ADL tasks.     Time Calculation:   Evaluation Complexity (OT)  Review Occupational Profile/Medical/Therapy History Complexity: expanded/moderate complexity  Assessment, Occupational Performance/Identification of Deficit Complexity: 3-5 performance deficits  Clinical Decision Making Complexity (OT): problem focused assessment/low complexity  Overall Complexity of Evaluation (OT): low complexity     Time Calculation- OT       Row Name 06/25/25 1102             Time Calculation- OT    OT Received On 06/25/25  -AV      OT Goal Re-Cert Due Date 07/04/25  -AV         Untimed Charges    OT Eval/Re-eval Minutes 35  -AV         Total Minutes    Untimed Charges Total Minutes 35  -AV       Total Minutes 35  -AV                User Key  (r) = Recorded By, (t) = Taken By, (c) = Cosigned By      Initials Name Provider Type    AV Dontae Villagomez OT Occupational Therapist                  Therapy Charges for Today       Code Description Service Date Service Provider Modifiers Qty    [id number] HC OT EVAL LOW COMPLEXITY 3 6/25/2025 Dontae Villagomez OT GO 1                 Dontae Villagomez OT  6/25/2025

## 2025-06-26 LAB
ALBUMIN SERPL-MCNC: 2.1 G/DL (ref 3.5–5.2)
ALBUMIN/GLOB SERPL: 0.6 G/DL
ALP SERPL-CCNC: 125 U/L (ref 39–117)
ALT SERPL W P-5'-P-CCNC: 7 U/L (ref 1–33)
ANION GAP SERPL CALCULATED.3IONS-SCNC: 13.2 MMOL/L (ref 5–15)
AST SERPL-CCNC: 14 U/L (ref 1–32)
BACTERIA SPEC AEROBE CULT: ABNORMAL
BASOPHILS # BLD AUTO: 0.02 10*3/MM3 (ref 0–0.2)
BASOPHILS NFR BLD AUTO: 0.2 % (ref 0–1.5)
BILIRUB SERPL-MCNC: 0.3 MG/DL (ref 0–1.2)
BUN SERPL-MCNC: 5.4 MG/DL (ref 6–20)
BUN/CREAT SERPL: 21.6 (ref 7–25)
CALCIUM SPEC-SCNC: 8.3 MG/DL (ref 8.6–10.5)
CHLORIDE SERPL-SCNC: 96 MMOL/L (ref 98–107)
CO2 SERPL-SCNC: 23.8 MMOL/L (ref 22–29)
CREAT SERPL-MCNC: 0.25 MG/DL (ref 0.57–1)
DEPRECATED RDW RBC AUTO: 47.9 FL (ref 37–54)
EGFRCR SERPLBLD CKD-EPI 2021: 127.9 ML/MIN/1.73
EOSINOPHIL # BLD AUTO: 0.01 10*3/MM3 (ref 0–0.4)
EOSINOPHIL NFR BLD AUTO: 0.1 % (ref 0.3–6.2)
ERYTHROCYTE [DISTWIDTH] IN BLOOD BY AUTOMATED COUNT: 15.6 % (ref 12.3–15.4)
GLOBULIN UR ELPH-MCNC: 3.3 GM/DL
GLUCOSE BLDC GLUCOMTR-MCNC: 100 MG/DL (ref 70–99)
GLUCOSE BLDC GLUCOMTR-MCNC: 108 MG/DL (ref 70–99)
GLUCOSE SERPL-MCNC: 97 MG/DL (ref 65–99)
GRAM STN SPEC: ABNORMAL
GRAM STN SPEC: ABNORMAL
HCT VFR BLD AUTO: 40.3 % (ref 34–46.6)
HGB BLD-MCNC: 11.9 G/DL (ref 12–15.9)
IMM GRANULOCYTES # BLD AUTO: 0.08 10*3/MM3 (ref 0–0.05)
IMM GRANULOCYTES NFR BLD AUTO: 0.9 % (ref 0–0.5)
LYMPHOCYTES # BLD AUTO: 0.8 10*3/MM3 (ref 0.7–3.1)
LYMPHOCYTES NFR BLD AUTO: 9 % (ref 19.6–45.3)
MAGNESIUM SERPL-MCNC: 1.4 MG/DL (ref 1.6–2.6)
MCH RBC QN AUTO: 24.9 PG (ref 26.6–33)
MCHC RBC AUTO-ENTMCNC: 29.5 G/DL (ref 31.5–35.7)
MCV RBC AUTO: 84.3 FL (ref 79–97)
MONOCYTES # BLD AUTO: 0.52 10*3/MM3 (ref 0.1–0.9)
MONOCYTES NFR BLD AUTO: 5.9 % (ref 5–12)
NEUTROPHILS NFR BLD AUTO: 7.43 10*3/MM3 (ref 1.7–7)
NEUTROPHILS NFR BLD AUTO: 83.9 % (ref 42.7–76)
NRBC BLD AUTO-RTO: 0 /100 WBC (ref 0–0.2)
PHOSPHATE SERPL-MCNC: 2.3 MG/DL (ref 2.5–4.5)
PLATELET # BLD AUTO: 201 10*3/MM3 (ref 140–450)
PMV BLD AUTO: 10.9 FL (ref 6–12)
POTASSIUM SERPL-SCNC: 3.7 MMOL/L (ref 3.5–5.2)
PROT SERPL-MCNC: 5.4 G/DL (ref 6–8.5)
QT INTERVAL: 365 MS
QTC INTERVAL: 490 MS
RBC # BLD AUTO: 4.78 10*6/MM3 (ref 3.77–5.28)
SODIUM SERPL-SCNC: 133 MMOL/L (ref 136–145)
WBC NRBC COR # BLD AUTO: 8.86 10*3/MM3 (ref 3.4–10.8)

## 2025-06-26 PROCEDURE — 99233 SBSQ HOSP IP/OBS HIGH 50: CPT | Performed by: INTERNAL MEDICINE

## 2025-06-26 PROCEDURE — 80053 COMPREHEN METABOLIC PANEL: CPT | Performed by: INTERNAL MEDICINE

## 2025-06-26 PROCEDURE — 94799 UNLISTED PULMONARY SVC/PX: CPT

## 2025-06-26 PROCEDURE — 83735 ASSAY OF MAGNESIUM: CPT | Performed by: INTERNAL MEDICINE

## 2025-06-26 PROCEDURE — 84100 ASSAY OF PHOSPHORUS: CPT | Performed by: INTERNAL MEDICINE

## 2025-06-26 PROCEDURE — 93010 ELECTROCARDIOGRAM REPORT: CPT | Performed by: INTERNAL MEDICINE

## 2025-06-26 PROCEDURE — 25010000002 CEFEPIME PER 500 MG: Performed by: INTERNAL MEDICINE

## 2025-06-26 PROCEDURE — 25010000002 KETOROLAC TROMETHAMINE PER 15 MG: Performed by: INTERNAL MEDICINE

## 2025-06-26 PROCEDURE — 93005 ELECTROCARDIOGRAM TRACING: CPT | Performed by: PHYSICIAN ASSISTANT

## 2025-06-26 PROCEDURE — 25010000003 DEXTROSE 5 % SOLUTION: Performed by: INTERNAL MEDICINE

## 2025-06-26 PROCEDURE — 25810000003 SODIUM CHLORIDE 0.9 % SOLUTION: Performed by: PHYSICIAN ASSISTANT

## 2025-06-26 PROCEDURE — 82948 REAGENT STRIP/BLOOD GLUCOSE: CPT

## 2025-06-26 PROCEDURE — 85025 COMPLETE CBC W/AUTO DIFF WBC: CPT | Performed by: INTERNAL MEDICINE

## 2025-06-26 PROCEDURE — 25010000002 MAGNESIUM SULFATE 2 GM/50ML SOLUTION: Performed by: INTERNAL MEDICINE

## 2025-06-26 PROCEDURE — 25010000002 DIGOXIN PER 500 MCG: Performed by: PHYSICIAN ASSISTANT

## 2025-06-26 PROCEDURE — 25010000002 DEXAMETHASONE SODIUM PHOSPHATE 10 MG/ML SOLUTION: Performed by: INTERNAL MEDICINE

## 2025-06-26 RX ORDER — LIDOCAINE 4 G/G
3 PATCH TOPICAL
Status: DISCONTINUED | OUTPATIENT
Start: 2025-06-26 | End: 2025-06-27 | Stop reason: HOSPADM

## 2025-06-26 RX ORDER — ADENOSINE 3 MG/ML
6 INJECTION, SOLUTION INTRAVENOUS ONCE
Status: DISCONTINUED | OUTPATIENT
Start: 2025-06-26 | End: 2025-06-26

## 2025-06-26 RX ORDER — KETOROLAC TROMETHAMINE 30 MG/ML
15 INJECTION, SOLUTION INTRAMUSCULAR; INTRAVENOUS EVERY 6 HOURS PRN
Status: DISCONTINUED | OUTPATIENT
Start: 2025-06-26 | End: 2025-06-27 | Stop reason: HOSPADM

## 2025-06-26 RX ORDER — ACETAMINOPHEN 500 MG
1000 TABLET ORAL EVERY 8 HOURS
Status: DISCONTINUED | OUTPATIENT
Start: 2025-06-26 | End: 2025-06-27 | Stop reason: HOSPADM

## 2025-06-26 RX ORDER — MIDODRINE HYDROCHLORIDE 5 MG/1
5 TABLET ORAL ONCE
Status: COMPLETED | OUTPATIENT
Start: 2025-06-26 | End: 2025-06-26

## 2025-06-26 RX ORDER — DIGOXIN 0.25 MG/ML
250 INJECTION INTRAMUSCULAR; INTRAVENOUS ONCE
Status: COMPLETED | OUTPATIENT
Start: 2025-06-26 | End: 2025-06-26

## 2025-06-26 RX ORDER — PANTOPRAZOLE SODIUM 40 MG/10ML
40 INJECTION, POWDER, LYOPHILIZED, FOR SOLUTION INTRAVENOUS
Status: DISCONTINUED | OUTPATIENT
Start: 2025-06-26 | End: 2025-06-27 | Stop reason: HOSPADM

## 2025-06-26 RX ORDER — DEXAMETHASONE SODIUM PHOSPHATE 10 MG/ML
8 INJECTION, SOLUTION INTRAMUSCULAR; INTRAVENOUS DAILY
Status: DISCONTINUED | OUTPATIENT
Start: 2025-06-26 | End: 2025-06-27 | Stop reason: HOSPADM

## 2025-06-26 RX ORDER — ACETAMINOPHEN 325 MG/1
650 TABLET ORAL EVERY 6 HOURS PRN
Status: DISCONTINUED | OUTPATIENT
Start: 2025-06-26 | End: 2025-06-26

## 2025-06-26 RX ORDER — MAGNESIUM SULFATE HEPTAHYDRATE 40 MG/ML
2 INJECTION, SOLUTION INTRAVENOUS ONCE
Status: COMPLETED | OUTPATIENT
Start: 2025-06-26 | End: 2025-06-26

## 2025-06-26 RX ORDER — MIDODRINE HYDROCHLORIDE 10 MG/1
10 TABLET ORAL
Status: DISCONTINUED | OUTPATIENT
Start: 2025-06-26 | End: 2025-06-27 | Stop reason: HOSPADM

## 2025-06-26 RX ORDER — HYDROCODONE BITARTRATE AND ACETAMINOPHEN 5; 325 MG/1; MG/1
1 TABLET ORAL ONCE AS NEEDED
Refills: 0 | Status: COMPLETED | OUTPATIENT
Start: 2025-06-26 | End: 2025-06-26

## 2025-06-26 RX ADMIN — Medication 10 ML: at 08:28

## 2025-06-26 RX ADMIN — Medication 1 TABLET: at 08:13

## 2025-06-26 RX ADMIN — MIDODRINE HYDROCHLORIDE 5 MG: 5 TABLET ORAL at 01:31

## 2025-06-26 RX ADMIN — ACETAMINOPHEN 650 MG: 325 TABLET ORAL at 01:38

## 2025-06-26 RX ADMIN — HYDROCODONE BITARTRATE AND ACETAMINOPHEN 1 TABLET: 5; 325 TABLET ORAL at 05:28

## 2025-06-26 RX ADMIN — FAMOTIDINE 40 MG: 20 TABLET, FILM COATED ORAL at 08:13

## 2025-06-26 RX ADMIN — ALPRAZOLAM 0.25 MG: 0.25 TABLET ORAL at 20:52

## 2025-06-26 RX ADMIN — DIGOXIN 250 MCG: 0.25 INJECTION INTRAMUSCULAR; INTRAVENOUS at 05:26

## 2025-06-26 RX ADMIN — ACETAMINOPHEN 1000 MG: 500 TABLET ORAL at 20:52

## 2025-06-26 RX ADMIN — BUDESONIDE 0.5 MG: 0.5 SUSPENSION RESPIRATORY (INHALATION) at 21:43

## 2025-06-26 RX ADMIN — CEFEPIME 2000 MG: 2 INJECTION, POWDER, FOR SOLUTION INTRAVENOUS at 17:56

## 2025-06-26 RX ADMIN — KETOROLAC TROMETHAMINE 15 MG: 30 INJECTION, SOLUTION INTRAMUSCULAR; INTRAVENOUS at 13:59

## 2025-06-26 RX ADMIN — ACETAMINOPHEN 1000 MG: 500 TABLET ORAL at 14:38

## 2025-06-26 RX ADMIN — MIDODRINE HYDROCHLORIDE 10 MG: 10 TABLET ORAL at 20:33

## 2025-06-26 RX ADMIN — Medication 10 ML: at 20:35

## 2025-06-26 RX ADMIN — ARFORMOTEROL TARTRATE 15 MCG: 15 SOLUTION RESPIRATORY (INHALATION) at 21:43

## 2025-06-26 RX ADMIN — SODIUM CHLORIDE 500 ML: 9 INJECTION, SOLUTION INTRAVENOUS at 01:31

## 2025-06-26 RX ADMIN — MIDODRINE HYDROCHLORIDE 10 MG: 10 TABLET ORAL at 13:59

## 2025-06-26 RX ADMIN — HYDROCODONE BITARTRATE AND ACETAMINOPHEN 1 TABLET: 10; 325 TABLET ORAL at 13:59

## 2025-06-26 RX ADMIN — MIDODRINE HYDROCHLORIDE 5 MG: 5 TABLET ORAL at 08:13

## 2025-06-26 RX ADMIN — DEXAMETHASONE SODIUM PHOSPHATE 8 MG: 10 INJECTION INTRAMUSCULAR; INTRAVENOUS at 14:39

## 2025-06-26 RX ADMIN — MAGNESIUM SULFATE HEPTAHYDRATE 2 G: 40 INJECTION, SOLUTION INTRAVENOUS at 20:34

## 2025-06-26 RX ADMIN — CEFEPIME 2000 MG: 2 INJECTION, POWDER, FOR SOLUTION INTRAVENOUS at 08:14

## 2025-06-26 RX ADMIN — LIDOCAINE 3 PATCH: 4 PATCH TOPICAL at 14:38

## 2025-06-26 RX ADMIN — SODIUM CHLORIDE 500 ML: 9 INJECTION, SOLUTION INTRAVENOUS at 05:26

## 2025-06-26 RX ADMIN — ALPRAZOLAM 0.25 MG: 0.25 TABLET ORAL at 08:13

## 2025-06-26 RX ADMIN — CEFEPIME 2000 MG: 2 INJECTION, POWDER, FOR SOLUTION INTRAVENOUS at 00:18

## 2025-06-26 RX ADMIN — SODIUM PHOSPHATE, MONOBASIC, MONOHYDRATE AND SODIUM PHOSPHATE, DIBASIC, ANHYDROUS 15 MMOL: 142; 276 INJECTION, SOLUTION INTRAVENOUS at 23:57

## 2025-06-26 RX ADMIN — HYDROCODONE BITARTRATE AND ACETAMINOPHEN 1 TABLET: 10; 325 TABLET ORAL at 22:02

## 2025-06-26 NOTE — PROGRESS NOTES
[facility]   Hospitalist Progress Note  Date: 2025  Patient Name: Isha Llanes  : 1965  MRN: [id number]  Date of admission: 2025    Subjective   Subjective     Chief Complaint:   Sepsis    Summary:   Isha Llanes is a 59 y.o. female with a past medical history significant for CHF, COPD, anaplastic lymphoma, non-small cell lung cancer, presents to the hospital with shortness of breath, tachycardia, upon inspection patient has pretty severe lower extremity cellulitis, she was tachycardic, she had a leukocytosis, she had borderline blood pressures, patient was given IV fluids, patient was started on cefepime.  Patient is being admitted to the hospital for treatment of her sepsis     Interval Followup:   Over the past 24 hours patient continues to be drowsy, continues to wake up with stimuli and requests pain medicine.  Blood pressure has continued to be borderline    Objective   Objective     Vitals:   Temp:  [97.5 °F (36.4 °C)-99.3 °F (37.4 °C)] 97.5 °F (36.4 °C)  Heart Rate:  [107-203] 108  Resp:  [16-18] 16  BP: ()/(52-77) 82/52  Flow (L/min) (Oxygen Therapy):  [0-2] 2    Physical Exam   GEN: No acute distress, continues to be very sleepy laying her head on bedside table  HEENT: Moist mucous membranes  LUNGS: Equal chest rise bilaterally  CARDIAC: Regular rate and rhythm  NEURO: Moving all 4 extremities spontaneously  SKIN: Concerned area for shingles on back    Result Review    Result Review:  I have personally reviewed the results as below  [x]  Laboratory CBC, CMP personally reviewed  CBC          2025    08:50 2025    06:49 2025    05:11   CBC   WBC 17.42  6.92  8.86    RBC 4.88  4.37  4.78    Hemoglobin 12.1  10.8  11.9    Hematocrit 40.4  37.1  40.3    MCV 82.8  84.9  84.3    MCH 24.8  24.7  24.9    MCHC 30.0  29.1  29.5    RDW 15.4  15.3  15.6    Platelets 251  151  201      CMP          2025    08:50 2025    06:49 2025    05:11   CMP    Glucose 267  143  97    BUN 9.2  7.1  5.4    Creatinine 0.44  0.23  0.25    EGFR 111.6  130.5  127.9    Sodium 131  133  133    Potassium 4.4  3.6  3.7    Chloride 92  99  96    Calcium 8.9  8.0  8.3    Total Protein 6.4  5.1  5.4    Albumin 2.8  2.0  2.1    Globulin 3.6  3.1  3.3    Total Bilirubin 0.4  0.2  0.3    Alkaline Phosphatase 146  103  125    AST (SGOT) 16  13  14    ALT (SGPT) 9  7  7    Albumin/Globulin Ratio 0.8  0.6  0.6    BUN/Creatinine Ratio 20.9  30.9  21.6    Anion Gap 13.7  9.0  13.2      []  Microbiology  []  Radiology  []  EKG/Telemetry   []  Cardiology/Vascular   []  Pathology  []  Old records  []  Other:    Scheduled medications:  acyclovir, 400 mg, Oral, TID  ALPRAZolam, 0.25 mg, Oral, BID  arformoterol, 15 mcg, Nebulization, BID - RT  budesonide, 0.5 mg, Nebulization, BID - RT  cefepime, 2,000 mg, Intravenous, Q8H  famotidine, 40 mg, Oral, Daily  heparin (porcine), 5,000 Units, Subcutaneous, Q8H  insulin lispro, 2-7 Units, Subcutaneous, 4x Daily AC & at Bedtime  magnesium sulfate, 2 g, Intravenous, Once  melatonin, 5 mg, Oral, Nightly  midodrine, 5 mg, Oral, TID AC  multivitamin with minerals, 1 tablet, Oral, Daily  PARoxetine, 20 mg, Oral, QAM  sodium chloride, 10 mL, Intravenous, Q12H  sodium phosphate, 15 mmol, Intravenous, Once      As needed medications:    acetaminophen    senna-docusate sodium **AND** polyethylene glycol **AND** bisacodyl **AND** bisacodyl    dextrose    dextrose    glucagon (human recombinant)    HYDROcodone-acetaminophen    HYDROmorphone    ipratropium-albuterol    ketorolac    nitroglycerin    ondansetron    sodium chloride    sodium chloride    sodium chloride  Infusions:  pain,            Assessment & Plan   Assessment / Plan     Assessment:  Sepsis  Lower extremity cellulitis  Tachycardia  Anaplastic lymphoma  Lung cancer  Anxiety  COPD without acute exacerbation  Diabetes mellitus  GERD  Compression fractures of the lumbar spine  History of CHF not in acute  exacerbation  Aortic stenosis  Possible UTI     Plan:  Patient admitted to the hospital for further workup and management of above  Patient found to meet sepsis criteria, received sepsis fluids in the ED, blood pressure did improve but drops with pain medication  Hold additional IV fluids in the setting of heart failure  Continue cefepime for treatment of her lower extremity cellulitis  Continue acyclovir for active shingles, patient refusing  Blood cultures ordered and pending  Urinalysis reviewed, trace bacteria trace leukocytes  Hold diuretics today given hypotension  Repeat lactate improving  Blood cultures no growth to date  Lower extremity wound culture growing gram-negative bacilli  Continue Norco as blood pressure allows, patient has pain pump in place  Patient's primary oncologist consulted, appreciate their recommendations  Continue Brovana, Pulmicort, DuoNebs  Add IV Toradol to help with pain without dropping blood pressure  Increase midodrine today to 10 3 times daily, this was started yesterday at a dose of 5  CBC, CMP reviewed 6/26/2025   Repeat CBC, CMP, mag and Phos in a.m.  Clinical course will dictate further management     Reviewed patient's labs and imaging, and discussed with patient and nurse at bedside.    VTE Prophylaxis:  Pharmacologic VTE prophylaxis orders are present.    Time spent: Time spent involving patient care including face-to-face encounter 51 minutes    CODE STATUS:   Code Status (Patient has no pulse and is not breathing): CPR (Attempt to Resuscitate)  Medical Interventions (Patient has pulse or is breathing): Full Support      Electronically signed by Bull Davila MD, 6/26/2025, 11:01 EDT.      Addendum:  Called the patient's bedside to speak with daughter, went through patient's current care plan in detail, and talked about goals of care as well as ways to control patient's pain.  Explained the patient was started on midodrine to help increase her blood pressure to make  room for some pain medicine while we are treating the infection.  Additionally told her she was started on Toradol as well as scheduled acetaminophen to help with pain.  Did discuss with the nursing staff that if patient systolic blood pressures greater than 90 then I am okay with her having the Norco.  Additionally I will start patient on Decadron to help with bone pain, lidocaine patches on the back to help with pain.  Patient will need imaging of the spine as she has had several falls and she does have a new not in the midline, she does have a known pain pump which the dose was recently decreased as her pain management thought she was undergoing comfort care.  We will continue to try to control patient's pain without doing her harm.  This was explained to the patient and daughter in detail.

## 2025-06-26 NOTE — SIGNIFICANT NOTE
06/26/25 1000   Physical Therapy Time and Intention   Session Not Performed patient/family declined evaluation

## 2025-06-26 NOTE — PROGRESS NOTES
[facility]     Progress Note    Patient Name: Isha Llanes  : 1965  MRN: [id number]  Primary Care Physician:  Virgil Salas MD  Date of admission: 2025    Subjective   Subjective     Chief Complaint: At this time her main issue is cellulitis and hypotension she has been complaining of a lot of pain, does have lymphoma for which the treatment is pending    HPI: Patient with lymphoma but current problem is that of cellulitis    Review of Systems   All systems were reviewed and negative except for: Has been reviewed    Objective   Objective     Vitals:   Temp:  [97.3 °F (36.3 °C)-99.3 °F (37.4 °C)] 97.5 °F (36.4 °C)  Heart Rate:  [107-203] 108  Resp:  [16-20] 16  BP: ()/(52-77) 82/52  Flow (L/min) (Oxygen Therapy):  [0-2] 2    Physical Exam    Constitutional: Alert and oriented not in acute distress   Eyes: Pupils equal reacting to light and accommodation   HENT: Normocephalic   Neck: No lymphadenopathy   Respiratory: No rales or rhonchi   Cardiovascular: S1-S2 normal no S3 or S4   Gastrointestinal: No palpable organomegaly   Musculoskeletal: No deformities with patient has erythema of both lower extremities   Neurologic: No localizing signs  Skin: No rash       Result Review    Result Review:  I have personally reviewed the results from the time of this admission to 2025 08:24 EDT and agree with these findings:  [x]  Laboratory  []  Microbiology  []  Radiology  []  EKG/Telemetry   []  Cardiology/Vascular   []  Pathology  []  Old records  []  Other:  Most notable findings include: Have been reviewed and discussed    Assessment & Plan   Assessment / Plan     Brief Patient Summary:  Isha Llanes is a 59 y.o. female who patient with peripheral vascular disease but hypotension    Active Hospital Problems:  Active Hospital Problems    Diagnosis     **Cellulitis        Plan:   Continue antibiotic    VTE Prophylaxis:  Pharmacologic VTE prophylaxis orders are  present.        CODE STATUS:   Code Status (Patient has no pulse and is not breathing): CPR (Attempt to Resuscitate)  Medical Interventions (Patient has pulse or is breathing): Full Support    Disposition:  I expect patient to be discharged after the patient has been stabilized.    Electronically signed by Teodoro Mancuso MD, 06/26/25, 8:24 AM EDT.      Part of this note may be an electronic transcription/translation of spoken language to printed text using the Dragon Dictation System.

## 2025-06-26 NOTE — PLAN OF CARE
Goal Outcome Evaluation:      Complaint of pain several time throughout shift, blood pressure drops to 80s/40s with pain med administration, will continue with plan of care

## 2025-06-26 NOTE — PLAN OF CARE
Goal Outcome Evaluation:  Plan of Care Reviewed With: patient        Progress: no change  Outcome Evaluation: Patient alert and oriented x4. On 2L NC. Patient c/o pain but d/t low threshold on her bp MD stated if SBP >100 then can get Ennice 10mg. MD stated not to give PO dilaudid d/t blood pressure and to give Norco as bp tolerates. Monitor blood pressure closely. Educated patient and daughter multiple times in regards to monitoring blood pressure and being unable to give pain medicine if patients blood pressure is low. Frustrated with pain management. Palliative consulted. Right upper back shingles noted, one open spot seen, MD notified and placed in contact/airborne precautions. Continuing with plan of care

## 2025-06-26 NOTE — PLAN OF CARE
Goal Outcome Evaluation:  Plan of Care Reviewed With: patient        Progress: no change  Outcome Evaluation: pt is A&Ox4. on 2L NC. pt c/o of pain but unable to treat with norco or dilaudid due to hypotension and systolic less than 100. md was notified and given another dose of midodrine and 500cc bolus over 1hr to not overload pt. gave prn tylenol to help with some pain until systolic BP >100. pt is frustrated and requested pallative, MD is aware and will address in the morning. Pt rrt due to HR getting to 190s-200 sustaining. Given 250 of digoxin and 500cc bolus. Hr came back down to 110s. Pt bp was 100 systolic, pain meds give. Pt resting in bed. Continue plan of care. Mikayla Somers RN

## 2025-06-27 VITALS
WEIGHT: 124.34 LBS | SYSTOLIC BLOOD PRESSURE: 103 MMHG | HEIGHT: 63 IN | OXYGEN SATURATION: 95 % | HEART RATE: 89 BPM | RESPIRATION RATE: 18 BRPM | TEMPERATURE: 97 F | DIASTOLIC BLOOD PRESSURE: 82 MMHG | BODY MASS INDEX: 22.03 KG/M2

## 2025-06-27 LAB
ALBUMIN SERPL-MCNC: 2.2 G/DL (ref 3.5–5.2)
ALBUMIN/GLOB SERPL: 0.7 G/DL
ALP SERPL-CCNC: 100 U/L (ref 39–117)
ALT SERPL W P-5'-P-CCNC: 6 U/L (ref 1–33)
ANION GAP SERPL CALCULATED.3IONS-SCNC: 12.1 MMOL/L (ref 5–15)
AST SERPL-CCNC: 8 U/L (ref 1–32)
BASOPHILS # BLD AUTO: 0.02 10*3/MM3 (ref 0–0.2)
BASOPHILS NFR BLD AUTO: 0.2 % (ref 0–1.5)
BILIRUB SERPL-MCNC: 0.2 MG/DL (ref 0–1.2)
BUN SERPL-MCNC: 11.4 MG/DL (ref 6–20)
BUN/CREAT SERPL: 36.8 (ref 7–25)
CALCIUM SPEC-SCNC: 8 MG/DL (ref 8.6–10.5)
CHLORIDE SERPL-SCNC: 94 MMOL/L (ref 98–107)
CO2 SERPL-SCNC: 24.9 MMOL/L (ref 22–29)
CREAT SERPL-MCNC: 0.31 MG/DL (ref 0.57–1)
DEPRECATED RDW RBC AUTO: 45.1 FL (ref 37–54)
EGFRCR SERPLBLD CKD-EPI 2021: 121.4 ML/MIN/1.73
EOSINOPHIL # BLD AUTO: 0 10*3/MM3 (ref 0–0.4)
EOSINOPHIL NFR BLD AUTO: 0 % (ref 0.3–6.2)
ERYTHROCYTE [DISTWIDTH] IN BLOOD BY AUTOMATED COUNT: 15.6 % (ref 12.3–15.4)
GLOBULIN UR ELPH-MCNC: 3.2 GM/DL
GLUCOSE BLDC GLUCOMTR-MCNC: 147 MG/DL (ref 70–99)
GLUCOSE BLDC GLUCOMTR-MCNC: 287 MG/DL (ref 70–99)
GLUCOSE SERPL-MCNC: 318 MG/DL (ref 65–99)
HCT VFR BLD AUTO: 41.3 % (ref 34–46.6)
HGB BLD-MCNC: 12.5 G/DL (ref 12–15.9)
IMM GRANULOCYTES # BLD AUTO: 0.11 10*3/MM3 (ref 0–0.05)
IMM GRANULOCYTES NFR BLD AUTO: 1.2 % (ref 0–0.5)
LYMPHOCYTES # BLD AUTO: 0.61 10*3/MM3 (ref 0.7–3.1)
LYMPHOCYTES NFR BLD AUTO: 6.7 % (ref 19.6–45.3)
MAGNESIUM SERPL-MCNC: 2 MG/DL (ref 1.6–2.6)
MCH RBC QN AUTO: 24.5 PG (ref 26.6–33)
MCHC RBC AUTO-ENTMCNC: 30.3 G/DL (ref 31.5–35.7)
MCV RBC AUTO: 80.8 FL (ref 79–97)
MONOCYTES # BLD AUTO: 0.43 10*3/MM3 (ref 0.1–0.9)
MONOCYTES NFR BLD AUTO: 4.7 % (ref 5–12)
NEUTROPHILS NFR BLD AUTO: 7.93 10*3/MM3 (ref 1.7–7)
NEUTROPHILS NFR BLD AUTO: 87.2 % (ref 42.7–76)
NRBC BLD AUTO-RTO: 0 /100 WBC (ref 0–0.2)
PHOSPHATE SERPL-MCNC: 5.3 MG/DL (ref 2.5–4.5)
PLATELET # BLD AUTO: 242 10*3/MM3 (ref 140–450)
PMV BLD AUTO: 10.7 FL (ref 6–12)
POTASSIUM SERPL-SCNC: 3.9 MMOL/L (ref 3.5–5.2)
PROT SERPL-MCNC: 5.4 G/DL (ref 6–8.5)
RBC # BLD AUTO: 5.11 10*6/MM3 (ref 3.77–5.28)
SODIUM SERPL-SCNC: 131 MMOL/L (ref 136–145)
WBC NRBC COR # BLD AUTO: 9.1 10*3/MM3 (ref 3.4–10.8)

## 2025-06-27 PROCEDURE — 83735 ASSAY OF MAGNESIUM: CPT | Performed by: INTERNAL MEDICINE

## 2025-06-27 PROCEDURE — 84100 ASSAY OF PHOSPHORUS: CPT | Performed by: INTERNAL MEDICINE

## 2025-06-27 PROCEDURE — 25010000002 DEXAMETHASONE SODIUM PHOSPHATE 10 MG/ML SOLUTION: Performed by: INTERNAL MEDICINE

## 2025-06-27 PROCEDURE — 97161 PT EVAL LOW COMPLEX 20 MIN: CPT

## 2025-06-27 PROCEDURE — 25010000002 KETOROLAC TROMETHAMINE PER 15 MG: Performed by: INTERNAL MEDICINE

## 2025-06-27 PROCEDURE — 82948 REAGENT STRIP/BLOOD GLUCOSE: CPT

## 2025-06-27 PROCEDURE — 99233 SBSQ HOSP IP/OBS HIGH 50: CPT | Performed by: INTERNAL MEDICINE

## 2025-06-27 PROCEDURE — 80053 COMPREHEN METABOLIC PANEL: CPT | Performed by: INTERNAL MEDICINE

## 2025-06-27 PROCEDURE — 25010000002 CEFEPIME PER 500 MG: Performed by: INTERNAL MEDICINE

## 2025-06-27 PROCEDURE — 85025 COMPLETE CBC W/AUTO DIFF WBC: CPT | Performed by: INTERNAL MEDICINE

## 2025-06-27 PROCEDURE — 63710000001 INSULIN LISPRO (HUMAN) PER 5 UNITS: Performed by: INTERNAL MEDICINE

## 2025-06-27 RX ADMIN — DEXAMETHASONE SODIUM PHOSPHATE 8 MG: 10 INJECTION INTRAMUSCULAR; INTRAVENOUS at 08:08

## 2025-06-27 RX ADMIN — CEFEPIME 2000 MG: 2 INJECTION, POWDER, FOR SOLUTION INTRAVENOUS at 08:00

## 2025-06-27 RX ADMIN — MIDODRINE HYDROCHLORIDE 10 MG: 10 TABLET ORAL at 10:44

## 2025-06-27 RX ADMIN — ACETAMINOPHEN 1000 MG: 500 TABLET ORAL at 05:24

## 2025-06-27 RX ADMIN — CEFEPIME 2000 MG: 2 INJECTION, POWDER, FOR SOLUTION INTRAVENOUS at 03:39

## 2025-06-27 RX ADMIN — HYDROMORPHONE HYDROCHLORIDE 2 MG: 2 TABLET ORAL at 15:09

## 2025-06-27 RX ADMIN — KETOROLAC TROMETHAMINE 15 MG: 30 INJECTION, SOLUTION INTRAMUSCULAR; INTRAVENOUS at 10:22

## 2025-06-27 RX ADMIN — INSULIN LISPRO 4 UNITS: 100 INJECTION, SOLUTION INTRAVENOUS; SUBCUTANEOUS at 07:56

## 2025-06-27 RX ADMIN — POLYETHYLENE GLYCOL 3350 17 G: 17 POWDER, FOR SOLUTION ORAL at 10:22

## 2025-06-27 RX ADMIN — HYDROMORPHONE HYDROCHLORIDE 2 MG: 2 TABLET ORAL at 10:44

## 2025-06-27 RX ADMIN — ACETAMINOPHEN 1000 MG: 500 TABLET ORAL at 15:09

## 2025-06-27 RX ADMIN — Medication 10 ML: at 08:07

## 2025-06-27 RX ADMIN — ALPRAZOLAM 0.25 MG: 0.25 TABLET ORAL at 08:05

## 2025-06-27 RX ADMIN — PANTOPRAZOLE SODIUM 40 MG: 40 INJECTION, POWDER, FOR SOLUTION INTRAVENOUS at 05:24

## 2025-06-27 RX ADMIN — HYDROCODONE BITARTRATE AND ACETAMINOPHEN 1 TABLET: 10; 325 TABLET ORAL at 08:00

## 2025-06-27 RX ADMIN — ACYCLOVIR 400 MG: 200 CAPSULE ORAL at 08:01

## 2025-06-27 NOTE — PROGRESS NOTES
[facility]   Hospitalist Progress Note  Date: 2025  Patient Name: Isha Llanes  : 1965  MRN: [id number]  Date of admission: 2025    Subjective   Subjective     Chief Complaint:   Sepsis    Summary:   Isha Llanes is a 59 y.o. female with a past medical history significant for CHF, COPD, anaplastic lymphoma, non-small cell lung cancer, presents to the hospital with shortness of breath, tachycardia, upon inspection patient has pretty severe lower extremity cellulitis, she was tachycardic, she had a leukocytosis, she had borderline blood pressures, patient was given IV fluids, patient was started on cefepime.  Patient is being admitted to the hospital for treatment of her sepsis     Interval Followup:   Over the past 24 hours patient continues to complain of pain, blood pressure low but improving, midodrine held this morning as nurse felt blood pressure was on the high side    Objective   Objective     Vitals:   Temp:  [97.3 °F (36.3 °C)-97.7 °F (36.5 °C)] 97.7 °F (36.5 °C)  Heart Rate:  [] 92  Resp:  [16-18] 18  BP: ()/() 108/76  Flow (L/min) (Oxygen Therapy):  [2-4.5] 3    Physical Exam   GEN: No acute distress, continues to be very sleepy laying her head on bedside table  HEENT: Moist mucous membranes  LUNGS: Equal chest rise bilaterally  CARDIAC: Regular rate and rhythm  NEURO: Moving all 4 extremities spontaneously  SKIN: Concerned area for shingles on back    Result Review    Result Review:  I have personally reviewed the results as below  [x]  Laboratory CBC, CMP personally reviewed  CBC          2025    06:49 2025    05:11 2025    05:23   CBC   WBC 6.92  8.86  9.10    RBC 4.37  4.78  5.11    Hemoglobin 10.8  11.9  12.5    Hematocrit 37.1  40.3  41.3    MCV 84.9  84.3  80.8    MCH 24.7  24.9  24.5    MCHC 29.1  29.5  30.3    RDW 15.3  15.6  15.6    Platelets 151  201  242      CMP          2025    06:49 2025    05:11 2025     05:23   CMP   Glucose 143  97  318    BUN 7.1  5.4  11.4    Creatinine 0.23  0.25  0.31    EGFR 130.5  127.9  121.4    Sodium 133  133  131    Potassium 3.6  3.7  3.9    Chloride 99  96  94    Calcium 8.0  8.3  8.0    Total Protein 5.1  5.4  5.4    Albumin 2.0  2.1  2.2    Globulin 3.1  3.3  3.2    Total Bilirubin 0.2  0.3  0.2    Alkaline Phosphatase 103  125  100    AST (SGOT) 13  14  8    ALT (SGPT) 7  7  6    Albumin/Globulin Ratio 0.6  0.6  0.7    BUN/Creatinine Ratio 30.9  21.6  36.8    Anion Gap 9.0  13.2  12.1      []  Microbiology  []  Radiology  []  EKG/Telemetry   []  Cardiology/Vascular   []  Pathology  []  Old records  []  Other:    Scheduled medications:  acetaminophen, 1,000 mg, Oral, Q8H  acyclovir, 400 mg, Oral, TID  ALPRAZolam, 0.25 mg, Oral, BID  arformoterol, 15 mcg, Nebulization, BID - RT  budesonide, 0.5 mg, Nebulization, BID - RT  cefepime, 2,000 mg, Intravenous, Q8H  dexAMETHasone, 8 mg, Intravenous, Daily  heparin (porcine), 5,000 Units, Subcutaneous, Q8H  insulin lispro, 2-7 Units, Subcutaneous, 4x Daily AC & at Bedtime  Lidocaine, 3 patch, Transdermal, Q24H  melatonin, 5 mg, Oral, Nightly  midodrine, 10 mg, Oral, TID AC  multivitamin with minerals, 1 tablet, Oral, Daily  pantoprazole, 40 mg, Intravenous, Q AM  PARoxetine, 20 mg, Oral, QAM  sodium chloride, 10 mL, Intravenous, Q12H      As needed medications:    senna-docusate sodium **AND** polyethylene glycol **AND** bisacodyl **AND** bisacodyl    dextrose    dextrose    glucagon (human recombinant)    HYDROcodone-acetaminophen    HYDROmorphone    ipratropium-albuterol    ketorolac    nitroglycerin    ondansetron    sodium chloride    sodium chloride    sodium chloride  Infusions:  pain,            Assessment & Plan   Assessment / Plan     Assessment:  Sepsis  Lower extremity cellulitis  Tachycardia  Anaplastic lymphoma  Lung cancer  Anxiety  COPD without acute exacerbation  Diabetes mellitus  GERD  Compression fractures of the lumbar  spine  History of CHF not in acute exacerbation  Aortic stenosis  Possible UTI     Plan:  Patient admitted to the hospital for further workup and management of above  Patient found to meet sepsis criteria, received sepsis fluids in the ED, blood pressure did improve but drops with pain medication  Hold additional IV fluids in the setting of heart failure  Continue cefepime for treatment of her lower extremity cellulitis, unfortunately she is allergic to multiple oral options  Continue acyclovir for active shingles, patient continues to refuse  Blood cultures ordered and pending  Urinalysis reviewed, trace bacteria trace leukocytes  Diuretics remain on hold  Lactate improved  Blood cultures no growth to date  Lower extremity wound culture growing Klebsiella and Enterobacter sensitive to cefepime  Continue Norco as blood pressure allows, patient has pain pump in place, blood pressure improved can trial oral Dilaudid today  Patient's primary oncologist consulted, appreciate their recommendations  Continue Brovana, Pulmicort, DuoNebs  Continue IV Toradol to help with pain without dropping blood pressure, monitor renal function  Continue midodrine  Continue Decadron to help with bone pain from metastasis  Plan for CT scan of the back once patient more stable  CBC, CMP reviewed 6/27/2025   Repeat CBC, CMP, mag and Phos in a.m.  Clinical course will dictate further management     Reviewed patient's labs and imaging, and discussed with patient and nurse at bedside.    VTE Prophylaxis:  Pharmacologic VTE prophylaxis orders are present.    Time spent: Time spent involving patient care including face-to-face encounter 52 minutes    CODE STATUS:   Code Status (Patient has no pulse and is not breathing): CPR (Attempt to Resuscitate)  Medical Interventions (Patient has pulse or is breathing): Full Support      Electronically signed by Bull Davila MD, 6/27/2025, 11:09 EDT.

## 2025-06-27 NOTE — PROGRESS NOTES
[facility]     Progress Note    Patient Name: Isha Llanes  : 1965  MRN: [id number]  Primary Care Physician:  Virgil Salas MD  Date of admission: 2025    Subjective   Subjective     Chief Complaint: Agree with the hospice consultation but patient is really, not very sure if she does not want to do anything since I have been seeing her she wants treatment to be given a second opinion has already been obtained from [facility] as well as [facility] and recommendation was to give brentuximab if patient is willing to get it it could be administered with minimal toxicity possible risk benefits complications alternatives have been explained, we already have preauthorization through the insurance and once the patient is discharged it can be administered however if the patient decides to go with palliative care that is appropriate to she has had multiple malignancies has been complaining of pain but at this time it appears to be stable blood pressures are stable, cellulitis is improving it is chronic has been there for a long time, patient does complaining of pain at least for the last 20 years    HPI: Patient with anaplastic large cell lymphoma CD30 positive candidate for brentuximab    Review of Systems   All systems were reviewed and negative except for: Has been reviewed    Objective   Objective     Vitals:   Temp:  [97.5 °F (36.4 °C)-97.7 °F (36.5 °C)] 97.7 °F (36.5 °C)  Heart Rate:  [86-98] 92  Resp:  [18] 18  BP: ()/() 108/76  Flow (L/min) (Oxygen Therapy):  [2-4.5] 3    Physical Exam    Constitutional: Alert oriented not in acute distress   Eyes: Pupils equal reacting to light and accommodation   HENT: Normocephalic   Neck: No lymphadenopathy   Respiratory: No rales or rhonchi   Cardiovascular: S1-S2 normal no S3 or S4   Gastrointestinal: No palpable organomegaly   Musculoskeletal: No deformities.  Patient has cellulitis   Neurologic: No localizing  signs  Skin: Patient has cellulitis of the legs with erythema       Result Review    Result Review:  I have personally reviewed the results from the time of this admission to 6/27/2025 11:30 EDT and agree with these findings:  [x]  Laboratory  []  Microbiology  []  Radiology  []  EKG/Telemetry   []  Cardiology/Vascular   []  Pathology  []  Old records  []  Other:  Most notable findings include: Have been reviewed and discussed    Assessment & Plan   Assessment / Plan     Brief Patient Summary:  Isha Llanes is a 59 y.o. female who patient with multiple malignancies with pain and considering possibility of hospice which could be appropriate however she could benefit from the chemotherapy if she is willing to    Active Hospital Problems:  Active Hospital Problems    Diagnosis     **Cellulitis        Plan:   We do have preauthorization through the insurance to give chemotherapy if the patient is able to get it he does have a very poor prognosis, and guarded prognosis for the future    VTE Prophylaxis:  Pharmacologic VTE prophylaxis orders are present.        CODE STATUS:   Code Status (Patient has no pulse and is not breathing): CPR (Attempt to Resuscitate)  Medical Interventions (Patient has pulse or is breathing): Full Support    Disposition:  I expect patient to be discharged after the patient has been stable.    Electronically signed by Teodoro Mancuso MD, 06/27/25, 11:30 AM EDT.      Part of this note may be an electronic transcription/translation of spoken language to printed text using the Dragon Dictation System.

## 2025-06-27 NOTE — NURSING NOTE
Palliative care visit for goals of care. Palliative care is familiar with patient from previous visits, see previous notes. At time of visit patient is alert and oriented to person, place, time and situation. Introduced self and explained role and purpose of visit. Discussed patients current condition- patient reports she has pain all over. Discussed goals of care including education on continued aggressive treatment versus focus on comfort as well as education on hospice care. Patient emotional during visit. Patient expresses she is afraid to disappoint her family but her main concern is managing her pain as she does not want to suffer. Expressed difficulty in managing pain while attempting to seek aggressive treatment- patient v/u. Provided education on code status including cpr, intubation and code event- patient requests to think further before making changes in this regard. Encouraged continued discussions regarding goals of care/treatment options. Palliative care will continue to follow to support and assist.    Charley CHOE, RN, PN  Palliative Care

## 2025-06-27 NOTE — SIGNIFICANT NOTE
06/27/25 0732   OTHER   Discipline occupational therapist   Rehab Time/Intention   Session Not Performed patient unavailable for treatment  (with PCA)

## 2025-06-27 NOTE — THERAPY EVALUATION
Acute Care - Physical Therapy Initial Evaluation  [facility] Go     Patient Name: Isha Llanes  : 1965  MRN: [id number]  Today's Date: 2025      Visit Dx:     ICD-10-CM ICD-9-CM   1. Sepsis, due to unspecified organism, unspecified whether acute organ dysfunction present  A41.9 038.9     995.91   2. Left leg cellulitis  L03.116 682.6   3. Decreased activities of daily living (ADL)  Z78.9 V49.89   4. Difficulty walking  R26.2 719.7     Patient Active Problem List   Diagnosis    Septic shock    Acute MI    Cancer associated pain    Presence of intrathecal pump    Chronic low back pain with bilateral sciatica    Sacroiliac inflammation    Chronic pain syndrome    Pelvic pain    Aortic stenosis, severe    Dyspnea on effort    Other pulmonary embolism without acute cor pulmonale    Nonrheumatic aortic valve stenosis    Hip pain    Anxiety disorder    Allergic rhinitis due to allergen    Arthritis    Asthma    Kidney disease    CHF (congestive heart failure)    Chronic obstructive pulmonary disease    Diabetes mellitus, type 2    Depression    GERD (gastroesophageal reflux disease)    S/P TAVR (transcatheter aortic valve replacement)    Limited mobility    Venous hypertension of both lower extremities    Nicotine dependence    MVP (mitral valve prolapse)    Lupus (systemic lupus erythematosus)    Long term current use of anticoagulant therapy    History of Hodgkin's lymphoma    Hepatic steatosis    Heart murmur    Cellulitis of lower extremity    CAD (coronary artery disease)    Non-healing wound of lower extremity, subsequent encounter    MSSA (methicillin susceptible Staphylococcus aureus) infection    Sepsis    Change in bowel habits    Nausea    Venous stasis ulcer of right calf    Venous stasis ulcer of left calf    Noncompliance    Peripheral vascular disease    Obesity with body mass index 30 or greater    Neurologic disorder    Neck pain    Limb pain    Hiatal hernia    Bladder disorder    Frailty     Cellulitis of right foot    Bowel disease    Atrophy of skin    Breast lump    Encounter for care related to vascular access port    Primary osteoarthritis of right hip    Tobacco abuse    Squamous cell carcinoma of larynx    Throat pain    Voice hoarseness    Hodgkin lymphoma    Malignant neoplasm of supraglottis    Dysphagia    Nausea vomiting and diarrhea    Weight loss, abnormal    Epigastric pain    Nausea and vomiting    History of laryngeal cancer    Anorexia    AMS (altered mental status)    Acute on chronic respiratory failure with hypoxia and hypercapnia    UTI (urinary tract infection)    Anterior neck pain    COVID-19 virus infection    Hypoxemia    Generalized weakness    Chronic respiratory failure with hypoxia and hypercapnia    Acute on chronic respiratory failure with hypercapnia    Intractable nausea and vomiting    Abnormal chest CT    Lung nodule    Hypercapnia    COPD exacerbation    Cellulitis     Past Medical History:   Diagnosis Date    Anesthesia     REPORTS HAS GOT REAL EMOTIONAL AND HAS BECOME ANGRY BEFORE    Anxiety     Aortic stenosis, severe     HAS BIO VALVE REPLACED    Arthritis     Asthma     Back pain     Blood clotting disorder     test to find out what type    Cancer     CHF (congestive heart failure)     Chronic low back pain with sciatica     Chronic pain disorder     COPD (chronic obstructive pulmonary disease)     Coronary artery disease     looking to follow with Cardio    Diabetes mellitus     TYPE 2    Dyspnea on effort     Fatty liver     GERD (gastroesophageal reflux disease)     H/O lumpectomy     H/O: hysterectomy     Hiatal hernia     History of degenerative disc disease     History of kidney stones     History of pulmonary embolus (PE)     History of transfusion     AS A CHILD NO TRANSFUSION REACTION    Hodgkin's lymphoma     5/19/23  5 YEARS SINCE LAST CHEMO TX    Hypertension     Immobility     Lupus     Nausea vomiting and diarrhea 03/07/2024    Panic disorder      Pelvic pain     Peripheral neuropathy     Personal history of DVT (deep vein thrombosis)     over 20 years    PONV (postoperative nausea and vomiting)     Presence of intrathecal pump     PTSD (post-traumatic stress disorder)     Sacroiliac joint disease     SI (sacroiliac) joint inflammation     Venous insufficiency      Past Surgical History:   Procedure Laterality Date    ABDOMINAL SURGERY      BACK SURGERY      SPINAL FUSION     BACK SURGERY      BLADDER REPAIR      BRONCHOSCOPY WITH ION ROBOTIC ASSIST N/A 4/22/2025    Procedure: BRONCHOSCOPY WITH ION ROBOT: REBUS, EBUS, NEEDLE ASPIRATES, BIOPSIES, BRUSHINGS, BAL, WASHINGS: insertion of lighted robot navigated instrument to view inside the lung;  Surgeon: Enzo Segal MD;  Location: [facility] MAIN OR;  Service: Robotics - Pulmonary;  Laterality: N/A;    CARDIAC CATHETERIZATION N/A 03/06/2019    Procedure: Valvuloplasty;  Surgeon: Wayne Johnson MD;  Location: [facility] CATH INVASIVE LOCATION;  Service: Cardiology    CARDIAC CATHETERIZATION      CARDIAC CATHETERIZATION Left 05/31/2023    Procedure: Cardiac Catheterization/Vascular Study;  Surgeon: Poncho Reid MD;  Location: [facility] CATH INVASIVE LOCATION;  Service: Cardiovascular;  Laterality: Left;    CARDIAC SURGERY      Mechanical valve    CARDIAC VALVE REPLACEMENT  2021    CHOLECYSTECTOMY      COLONOSCOPY N/A 12/29/2021    Procedure: COLONOSCOPY;  Surgeon: Karine Orlando MD;  Location: [facility] ENDOSCOPY;  Service: Gastroenterology;  Laterality: N/A;  POOR PREP    COLONOSCOPY N/A 04/13/2022    Procedure: COLONOSCOPY WITH POLYPECTOMY;  Surgeon: Karine Orlando MD;  Location: [facility] ENDOSCOPY;  Service: Gastroenterology;  Laterality: N/A;  COLON POLYP, HEMORRHOIDS, POOR PREP    COLONOSCOPY      DIRECT LARYNGOSCOPY, ESOPHAGOSCOPY, BRONCHOSCOPY N/A 05/22/2023    Procedure: DIRECT LARYNGOSCOPY WITH BIOPSIES , ESOPHAGOSCOPY, BRONCHOSCOPY;  Surgeon: Ronnie Mcgarry MD;   Location: [facility] OR OSC;  Service: ENT;  Laterality: N/A;    ENDOSCOPY N/A 12/29/2021    Procedure: ESOPHAGOGASTRODUODENOSCOPY;  Surgeon: Karine Orlando MD;  Location: [facility] ENDOSCOPY;  Service: Gastroenterology;  Laterality: N/A;  ESOPHAGITIS, GASTRITIS, HIATAL HERNIA    ENDOSCOPY      ENDOSCOPY N/A 04/16/2024    Procedure: ESOPHAGOGASTRODUODENOSCOPY WITH BIOPSIES;  Surgeon: Karine Orlando MD;  Location: [facility] ENDOSCOPY;  Service: Gastroenterology;  Laterality: N/A;  ESOPHAGITIS, HIATAL HERNIA    ENDOSCOPY W/ PEG TUBE PLACEMENT N/A 12/10/2024    Procedure: ESOPHAGOGASTRODUODENOSCOPY WITH PERCUTANEOUS ENDOSCOPIC GASTROSTOMY TUBE INSERTION WITH ANESTHESIA;  Surgeon: Rodger Ortega MD;  Location: [facility] ENDOSCOPY;  Service: Gastroenterology;  Laterality: N/A;  PEG TUBE INSERTION    HYSTERECTOMY      LYMPHADENECTOMY      PAIN PUMP INSERTION/REVISION N/A 10/18/2019    Procedure: PAIN PUMP INSERTION [facility] [place] 10-18-19 [place];  Surgeon: Horace Rios MD;  Location: [facility];  Service: Pain Management    PAIN PUMP INSERTION/REVISION N/A 11/23/2020    Procedure: pain pump removal;  Surgeon: Horace Rios MD;  Location: [facility];  Service: Pain Management;  Laterality: N/A;    PEG TUBE INSERTION N/A 07/26/2023    Procedure: PERCUTANEOUS ENDOSCOPIC GASTROSTOMY TUBE INSERTION;  Surgeon: Kody Mercer MD;  Location: [facility] ENDOSCOPY;  Service: General;  Laterality: N/A;  SUCCESSFUL PEG TUBE PLACE PLACEMENT    PORTACATH PLACEMENT      IN LEFT CHEST REPORTS THAT IT IS NOT FUNCTIONING    SKIN BIOPSY      TONSILLECTOMY      TUBAL ABDOMINAL LIGATION      TUMOR REMOVAL      vaginal /anal area     PT Assessment (Last 12 Hours)       PT Evaluation and Treatment       Row Name 06/27/25 1400          Physical Therapy Time and Intention    Subjective Information complains of;weakness  -DP     Document Type evaluation  -DP     Mode of Treatment individual therapy;physical therapy  -DP      Patient Effort fair  -DP       Row Name 06/27/25 1400          General Information    Patient Profile Reviewed yes  -DP     Patient Observations alert;agree to therapy  -DP     General Observations of Patient Pt needs occassional assist  -DP     Prior Level of Function min assist:;gait;transfer;bed mobility;ADL's  -DP     Equipment Currently Used at Home walker, rolling  -DP     Existing Precautions/Restrictions fall  -DP     Barriers to Rehab medically complex  -DP       Row Name 06/27/25 1400          Living Environment    Current Living Arrangements home  -DP     Home Accessibility wheelchair accessible  -DP     People in Home child(felix), adult  -DP       Row Name 06/27/25 1400          Range of Motion (ROM)    Range of Motion ROM is WFL  -DP       Row Name 06/27/25 1400          Strength (Manual Muscle Testing)    Strength (Manual Muscle Testing) bilateral lower extremities  3+/5  -DP       Row Name 06/27/25 1400          Bed Mobility    Comment, (Bed Mobility) Pt sitting on bed side commode upon arrival  -DP       Row Name 06/27/25 1400          Transfers    Transfers sit-stand transfer  -DP       Row Name 06/27/25 1400          Sit-Stand Transfer    Sit-Stand [place] (Transfers) moderate assist (50% patient effort)  -DP     Assistive Device (Sit-Stand Transfers) walker, front-wheeled  -DP       Row Name 06/27/25 1400          Gait/Stairs (Locomotion)    Gait/Stairs Locomotion gait/ambulation assistive device  -DP     [place] Level (Gait) moderate assist (50% patient effort)  -DP     Assistive Device (Gait) walker, front-wheeled  -DP     Distance in Feet (Gait) 2  -DP       Row Name 06/27/25 1400          Balance    Balance Assessment standing static balance  -DP     Static Standing Balance minimal assist  -DP       Row Name             Wound 01/02/25 1154 Right lateral leg    Wound - Properties Group Placement Date: 01/02/25  -MAGY Placement Time: 1154  -MAGY Side: Right  -MAGY Orientation: lateral  -MAGY  Location: leg  -MAGY    Retired Wound - Properties Group Placement Date: 01/02/25  -MAGY Placement Time: 1154  -MAGY Side: Right  -MAGY Orientation: lateral  -MAGY Location: leg  -MAGY    Retired Wound - Properties Group Placement Date: 01/02/25  -MAGY Placement Time: 1154  -MAGY Side: Right  -MAGY Orientation: lateral  -MAGY Location: leg  -MAGY    Retired Wound - Properties Group Date first assessed: 01/02/25  -MAGY Time first assessed: 1154  -MAGY Side: Right  -MAGY Location: leg  -MAGY      Row Name             Wound 01/02/25 1158 Left distal calf    Wound - Properties Group Placement Date: 01/02/25  -MAGY Placement Time: 1158  -MAGY Side: Left  -MAGY Orientation: distal  -MAGY Location: calf  -MAGY    Retired Wound - Properties Group Placement Date: 01/02/25  -MAGY Placement Time: 1158  -MAGY Side: Left  -MAGY Orientation: distal  -MAGY Location: calf  -MAGY    Retired Wound - Properties Group Placement Date: 01/02/25  -MAGY Placement Time: 1158  -MAGY Side: Left  -MAGY Orientation: distal  -MAGY Location: calf  -MAGY    Retired Wound - Properties Group Date first assessed: 01/02/25  -MAGY Time first assessed: 1158  -MAGY Side: Left  -MAGY Location: calf  -MAGY      Row Name             Wound 05/04/25 1206 Right distal calf    Wound - Properties Group Placement Date: 05/04/25  -AC Placement Time: 1206  -AC Side: Right  -AC Orientation: distal  -AC Location: calf  -AC    Retired Wound - Properties Group Placement Date: 05/04/25  -AC Placement Time: 1206  -AC Side: Right  -AC Orientation: distal  -AC Location: calf  -AC    Retired Wound - Properties Group Placement Date: 05/04/25  -AC Placement Time: 1206  -AC Side: Right  -AC Orientation: distal  -AC Location: calf  -AC    Retired Wound - Properties Group Date first assessed: 05/04/25  -AC Time first assessed: 1206  -AC Side: Right  -AC Location: calf  -AC      Row Name             Wound 06/10/25 1357 Right anterior fifth toe Vascular Ulcer    Wound - Properties Group Placement Date: 06/10/25  -FRANSICO Placement Time: 1357  -FRANSICO Side:  Right  -FRANSICO Orientation: anterior  -FRANSICO Location: fifth toe  -FRANSICO Primary Wound Type: Vascular Ulc  -FRANSICO    Retired Wound - Properties Group Placement Date: 06/10/25  -FRANSICO Placement Time: 1357  -FRANSICO Side: Right  -FRANSICO Orientation: anterior  -FRANSICO Location: fifth toe  -FRANSICO    Retired Wound - Properties Group Placement Date: 06/10/25  -FRANSICO Placement Time: 1357  -FRANSICO Side: Right  -FRANSICO Orientation: anterior  -FRANSICO Location: fifth toe  -FRANSICO    Retired Wound - Properties Group Date first assessed: 06/10/25  -FRANSICO Time first assessed: 1357  -FRANSICO Side: Right  -FRANSICO Location: fifth toe  -FRANSICO      Row Name             Wound 05/04/25 1255 Left cheek    Wound - Properties Group Placement Date: 05/04/25  -AC Placement Time: 1255  -AC Side: Left  -AC Location: cheek  -AC    Retired Wound - Properties Group Placement Date: 05/04/25  -AC Placement Time: 1255  -AC Side: Left  -AC Location: cheek  -AC    Retired Wound - Properties Group Placement Date: 05/04/25  -AC Placement Time: 1255  -AC Side: Left  -AC Location: cheek  -AC    Retired Wound - Properties Group Date first assessed: 05/04/25  -AC Time first assessed: 1255  -AC Side: Left  -AC Location: cheek  -AC      Row Name 06/27/25 1400          Plan of Care Review    Plan of Care Reviewed With patient  -DP     Outcome Evaluation Pt presents with decreased strength, transfers and functional mobility. Pt will benefit from inpatient PT services and placement in a rehab facility upon discharge.  -DP       Row Name 06/27/25 1400          Therapy Assessment/Plan (PT)    Criteria for Skilled Interventions Met (PT) yes;meets criteria  -DP     Therapy Frequency (PT) daily  -DP     Predicted Duration of Therapy Intervention (PT) 10 days  -DP     Problem List (PT) problems related to;mobility;balance  -DP     Activity Limitations Related to Problem List (PT) unable to ambulate safely;unable to transfer safely  -DP       Row Name 06/27/25 1400          PT Evaluation Complexity    History, PT Evaluation  Complexity no personal factors and/or comorbidities  -DP     Examination of Body Systems (PT Eval Complexity) total of 4 or more elements  -DP     Clinical Presentation (PT Evaluation Complexity) stable  -DP     Clinical Decision Making (PT Evaluation Complexity) low complexity  -DP     Overall Complexity (PT Evaluation Complexity) low complexity  -DP       Row Name 06/27/25 1400          Physical Therapy Goals    Bed Mobility Goal Selection (PT) bed mobility, PT goal 1  -DP     Transfer Goal Selection (PT) transfer, PT goal 1  -DP     Gait Training Goal Selection (PT) gait training, PT goal 1  -DP       Row Name 06/27/25 1400          Bed Mobility Goal 1 (PT)    Activity/Assistive Device (Bed Mobility Goal 1, PT) sit to supine/supine to sit  -DP     [place] Level/Cues Needed (Bed Mobility Goal 1, PT) minimum assist (75% or more patient effort)  -DP     Time Frame (Bed Mobility Goal 1, PT) 10 days  -DP       Row Name 06/27/25 1400          Transfer Goal 1 (PT)    Activity/Assistive Device (Transfer Goal 1, PT) sit-to-stand/stand-to-sit  -DP     [place] Level/Cues Needed (Transfer Goal 1, PT) minimum assist (75% or more patient effort)  -DP     Time Frame (Transfer Goal 1, PT) 10 days  -DP       Row Name 06/27/25 1400          Gait Training Goal 1 (PT)    Activity/Assistive Device (Gait Training Goal 1, PT) assistive device use;walker, rolling  -DP     [place] Level (Gait Training Goal 1, PT) contact guard required  -DP     Distance (Gait Training Goal 1, PT) 100  -DP     Time Frame (Gait Training Goal 1, PT) 10 days  -DP               User Key  (r) = Recorded By, (t) = Taken By, (c) = Cosigned By      Initials Name Provider Type    Isha Marrero, RN Registered Nurse    Siena Meza RN Registered Nurse    DP Yulia Casiano, PT Physical Therapist    AC Madisyn Merchant RN Registered Nurse                      PT Recommendation and Plan  Anticipated Discharge Disposition (PT): sub acute  care setting  Planned Therapy Interventions (PT): balance training, bed mobility training, gait training, neuromuscular re-education, strengthening, transfer training  Therapy Frequency (PT): daily  Plan of Care Reviewed With: patient  Outcome Evaluation: Pt presents with decreased strength, transfers and functional mobility. Pt will benefit from inpatient PT services and placement in a rehab facility upon discharge.   Outcome Measures       Row Name 06/27/25 1400             How much help from another person do you currently need...    Turning from your back to your side while in flat bed without using bedrails? 3  -DP      Moving from lying on back to sitting on the side of a flat bed without bedrails? 2  -DP      Moving to and from a bed to a chair (including a wheelchair)? 2  -DP      Standing up from a chair using your arms (e.g., wheelchair, bedside chair)? 2  -DP      Climbing 3-5 steps with a railing? 2  -DP      To walk in hospital room? 2  -DP      AM-PAC 6 Clicks Score (PT) 13  -DP         Functional Assessment    Outcome Measure Options AM-PAC 6 Clicks Basic Mobility (PT)  -DP                User Key  (r) = Recorded By, (t) = Taken By, (c) = Cosigned By      Initials Name Provider Type    Yulia Bernal, PT Physical Therapist                     Time Calculation:    PT Charges       Row Name 06/27/25 1420             Time Calculation    PT Received On 06/27/25  -DP      PT Goal Re-Cert Due Date 07/06/25  -DP         Untimed Charges    PT Eval/Re-eval Minutes 25  -DP         Total Minutes    Untimed Charges Total Minutes 25  -DP       Total Minutes 25  -DP                User Key  (r) = Recorded By, (t) = Taken By, (c) = Cosigned By      Initials Name Provider Type    Yulia Bernal, PT Physical Therapist                      PT G-Codes  Outcome Measure Options: AM-PAC 6 Clicks Basic Mobility (PT)  AM-PAC 6 Clicks Score (PT): 13  AM-PAC 6 Clicks Score (OT): 13    Yulia Casiano PT  6/27/2025

## 2025-06-27 NOTE — NURSING NOTE
Patient decided to leave AMA, educated on the medical risks with leaving and the medical benefits associated with remaining in the hospital. Patient is addiment that she is leaving and called her daughter to pick her up. Educated daughter as well on the risk and benefits of remaining in the hospital. Patient and daughter stated that their doctor will get them set up with Iv abt and wound care on Monday. Patient left the floor accompanied by her daughter.

## 2025-06-27 NOTE — PLAN OF CARE
Goal Outcome Evaluation:  Plan of Care Reviewed With: patient        Progress: no change  Outcome Evaluation: Continue isolation precautions. AFib on tele. VSS. Monitor bp for hypotension. Pain manged with PRN [place]. Call light in reach. 3L oxygen per [place].

## 2025-06-29 LAB
BACTERIA SPEC AEROBE CULT: NORMAL
BACTERIA SPEC AEROBE CULT: NORMAL

## 2025-06-30 LAB
QT INTERVAL: 390 MS
QTC INTERVAL: 502 MS

## 2025-06-30 NOTE — DISCHARGE SUMMARY
[facility]         HOSPITALIST  DISCHARGE SUMMARY    Patient Name: Isha Llanes  : 1965  MRN: [id number]    Date of Admission: 2025  Date of Discharge:  2025  Primary Care Physician: Virgil Salas MD    Consults       No orders found from 2025 to 2025.            Active and Resolved Hospital Problems:  Sepsis  Lower extremity cellulitis  Tachycardia  Anaplastic lymphoma  Lung cancer  Anxiety  COPD without acute exacerbation  Diabetes mellitus  GERD  Compression fractures of the lumbar spine  History of CHF not in acute exacerbation  Aortic stenosis  Possible UTI  Mood disorder not specified inhibiting patient's care    Hospital Course     Hospital Course:  Isha Llanes is a 59 y.o. female with a past medical history significant for CHF, COPD, anaplastic lymphoma, non-small cell lung cancer, presents to the hospital with shortness of breath, tachycardia, upon inspection patient has pretty severe lower extremity cellulitis, she was tachycardic, she had a leukocytosis, she had borderline blood pressures, patient was given IV fluids, patient was started on cefepime. Patient is being admitted to the hospital for treatment of her sepsis.  Patient's pain was difficult to control while in the hospital as her blood pressure was prohibiting this.  Patient was started on midodrine with improvement.  Patient's imaging was reviewed from prior hospitalizations and outpatient workups, she does have significant bone involvement, she was started on steroids with improvement of her pain.  Unfortunately patient allergy profile did not allow for us to prescribe her an antibiotic at discharge that would adequately cover her infection.  She was encouraged to stay in the hospital and complete treatment with IV cefepime, unfortunately she felt that she needed to leave the hospital.  Patient is at high risk for readmission and even death, I did discuss with her hematologist  who plans to see the patient in clinic.  Ultimately patient left the hospital AGAINST MEDICAL ADVICE    Day of Discharge       Discharge Details        Discharge Medications        ASK your doctor about these medications        Instructions Start Date   ALPRAZolam 0.25 MG tablet  Commonly known as: XANAX   0.25 mg, 2 Times Daily      budesonide 0.5 MG/2ML nebulizer solution  Commonly known as: PULMICORT  Ask about: Which instructions should I use?   0.5 mg, Nebulization, 2 Times Daily - RT      famotidine 40 MG tablet  Commonly known as: Pepcid   40 mg, Oral, Every Night at Bedtime      furosemide 20 MG tablet  Commonly known as: LASIX   20 mg, Daily      HYDROcodone-acetaminophen  MG per tablet  Commonly known as: NORCO   1 tablet, Every 4 Hours PRN      Insulin Lispro (1 Unit Dial) 100 UNIT/ML solution pen-injector  Commonly known as: HUMALOG   Inject 12 Units under the skin into the appropriate area as directed Daily.      ipratropium-albuterol 0.5-2.5 mg/3 ml nebulizer  Commonly known as: DUO-NEB   3 mL, Nebulization, 4 Times Daily PRN      loperamide 2 MG capsule  Commonly known as: IMODIUM   2 mg, Oral, 4 Times Daily PRN      melatonin 5 MG tablet tablet   5 mg, Nightly      metoprolol succinate XL 25 MG 24 hr tablet  Commonly known as: TOPROL-XL   25 mg, Oral, Every Night at Bedtime      nystatin 100,000 unit/mL suspension  Commonly known as: MYCOSTATIN   4 mL, Oral, 4 Times Daily, For 14 days      ondansetron ODT 8 MG disintegrating tablet  Commonly known as: ZOFRAN-ODT   Place 1 tablet on the tongue Every 8 (Eight) Hours As Needed for Nausea or Vomiting.      oxyCODONE 10 MG tablet  Commonly known as: ROXICODONE   1 tablet, Every 4 Hours PRN      pain patient supplied pump   Continuous      PARoxetine 20 MG tablet  Commonly known as: PAXIL   Take 1 tablet by mouth Every Morning.      predniSONE 5 MG tablet  Commonly known as: DELTASONE   15 mg, Oral, 2 Times Daily, 15mg in am 15mg in pm       triamcinolone 0.1 % cream  Commonly known as: KENALOG   1 Application, 2 Times Daily               Allergies   Allergen Reactions    Amoxicillin Shortness Of Breath     Has tolerated Cefazolin, Ceftriaxone, and Cefepime -Jermaine Melida, [facility]    Ceclor [Cefaclor] Shortness Of Breath     Has tolerated Cefazolin, Ceftriaxone, and Cefepime -Jermaine Melida, [facility]      Penicillins Shortness Of Breath     Has tolerated Cefazolin, Ceftriaxone, and Cefepime -Jermaine Melida, [facility]    Sulfa Antibiotics Rash    Valium [Diazepam] Mental Status Change     DEPRESSED    Ambien [Zolpidem] Mental Status Change    Aspirin GI Intolerance    Ativan [Lorazepam] Mental Status Change    Benadryl [Diphenhydramine] Anxiety     AND MAKES HER HYPER    Biaxin [Clarithromycin] Unknown - Low Severity    Cephalexin Unknown - Low Severity    Clindamycin Unknown - Low Severity    Compazine [Prochlorperazine Edisylate] Rash    Contrast Dye (Echo Or Unknown Ct/Mr) Other (See Comments)     Caused pain in her arm    Doxycycline Rash    Nsaids GI Intolerance    Phenergan [Promethazine Hcl] GI Intolerance    Promethazine GI Intolerance    Vancomycin Itching       Discharge Disposition:  Left Against Medical Advice    Diet:  Hospital:No active diet order      Discharge Activity:       CODE STATUS:  Code Status and Medical Interventions: CPR (Attempt to Resuscitate); Full Support   Ordered at: 06/24/25 1621     Code Status (Patient has no pulse and is not breathing):    CPR (Attempt to Resuscitate)     Medical Interventions (Patient has pulse or is breathing):    Full Support       Future Appointments   Date Time Provider Department Center   7/22/2025  8:00 AM Kelin Rushing APRN [facility]           Pertinent  and/or Most Recent Results     IMAGING:  XR Chest 1 View  Result Date: 6/24/2025  XR CHEST 1 VW Date of Exam: 6/24/2025 9:24 AM EDT Indication: Dysrhythmia Triage Protocol Comparison: None available. Findings: There are no airspace  consolidations. TAVR changes. Calcified granuloma at the right lung base. Left internal jugular chest port in place. No pleural fluid. No pneumothorax. The pulmonary vasculature appears within normal limits. The cardiac and mediastinal silhouette appear unremarkable. No acute osseous abnormality identified.     Impression: No radiographic evidence of acute pulmonary process Electronically Signed: Drake Zhao MD  6/24/2025 9:46 AM EDT  Workstation ID: [id number]    NM PET/CT Skull Base to Mid Thigh  Result Date: 6/18/2025  FDG NM PET/CT SKULL BASE TO MID THIGH Date of Exam: 6/12/2025 11:51 AM EDT Indication: HODGKINS LYMPHOMA. Comparison: CT chest 6/8/2025, PET/CT 2/25/2025 Technique:  PET/CT Head to Mid Thigh imaging was performed with positron emission tomography (PET with concurrently acquired computed tomography (CT) for attenuation correction and anatomical localization); field of view imaged was the skull base to midthigh.  Images were obtained after one hour of equilibrium. RADIONUCLITIDE: 12.1 MCI    F18 FDG - LV. Blood Glucose 77 mg/dl Uptake Time: 46 min. Mediastinal background Uptake: SUV max. 1.2 SUV Normalization method: Body weight FINDINGS:  There is abnormal activity in the clivus and occipital bone most concerning for neoplasm. This measures at least 2.5 cm and demonstrates a max SUV 10.9. Additional hypermetabolic activity in the occipital condyle. There appears to be fairly diffuse muscular activity in the neck. There is some metabolic activity in the right calvarium best seen on image 18. Neoplasm possible. Left IJ catheter terminates in the distal SVC. On image 100 there is a hypermetabolic left retropectoral lymph node measuring 1.2 cm in short axis max SUV 9.7. There are multiple hypermetabolic left axillary lymph nodes increased in number compared to prior PET scan largest measuring about 1.2 cm max SUV 9.7. Hypermetabolic right internal mammary adenopathy measuring about 0.8 cm in short  axis max SUV 7.3. There may be a small subcarinal lymph node is hypermetabolic measuring about 0.6 cm in short axis max SUV 4.9. Patient status post TAVR. Mitral calcification. 1.8 cm right lower lobe lung nodule may be partially cavitary and is bilobed similar in size to previous chest CT. This is hypermetabolic max SUV 4.9. Few tiny adjacent nodules are seen. There is mild nodular opacity in the left lower lobe There is a small right middle lobe nodule measuring 0.7 cm. This was not seen on recent CT 6/8/2025 and is not hypermetabolic. Cluster of abnormal lymph nodes are seen in the pollo hepatis markedly hypermetabolic measuring up to about 4.8 x 5 cm max SUV 10.6. There is numerous hypermetabolic osseous lesions. There is an osseous lesion left posterior iliac bone. Measuring 1.7 cm max SUV 8.5. Small hypermetabolic lesion right proximal femur. There is a 3 cm lesion in the inferior sternum max SUV 11. There is some activity seen in the left L4 vertebral body max SUV 3.9. Compression deformities again seen at T6, T7, T12 as seen on recent chest CT. There is a punctate right-sided nephrolith. Renal vascular calcification seen on the left. Diverticulosis and percutaneous gastrostomy tube in expected position. Atherosclerosis. There is a right flank electronic device with leads extending into the thoracic spine thecal sac.     1.Increased hypermetabolic adenopathy compared to prior PET scan. There are new hypermetabolic lymph nodes in the left retropectoral region, left axilla, right internal mammary region. Small hypermetabolic subcarinal lymph node concerning for neoplasm. Large cluster of lymph nodes in the pollo hepatis is hypermetabolic compatible with neoplasm. 2.1.8 cm right lower lobe lung nodule partially cavitary is hypermetabolic max SUV 4.9 compatible with neoplasm. 3.Small right middle lobe nodule new since recent chest CT is not hypermetabolic. Small nodular densities in the right and left lower lobe  could be infectious or inflammatory. Correlate for any symptoms. 4.Numerous new hypermetabolic osseous lesions are seen compared to prior PET scan compatible with neoplasm. This includes a 2 hypermetabolic lesion in the clivus extending into the occipital bone/occipital condyle, hypermetabolic lesion in the right calvarium, inferior sternum, left iliac bone, and right proximal femur. There are also some activity in the L4 vertebral body that could reflect neoplasm. 5.Other findings as above. Electronically Signed: Manuelito Almanzar MD  6/18/2025 12:51 PM EDT  Workstation ID: [id number]    CT Soft Tissue Neck With Contrast  Result Date: 6/9/2025  CT SOFT TISSUE NECK W CONTRAST Date of Exam: 6/8/2025 3:59 PM EDT Indication: Lymphoma. Comparison: 5/6/2025 Technique: Axial CT images were obtained of the neck after the uneventful intravenous administration of iodinated contrast.  Reconstructed coronal and sagittal images were also obtained. Automated exposure control and iterative construction methods were used. Findings: Visualized intracranial contents appear within normal limits. Globes and orbits also appear within normal limits. Nasopharynx appears within normal limits. There is asymmetric thickening along the left wall of the oropharynx extending inferiorly into the left piriform sinus and left aryepiglottic fold. There is also some soft tissue thickening posteriorly at the level of the piriform sinuses. These findings  are similar to the prior exam. Findings are nonspecific and could be related to tumor or treatment related change. Visualized trachea and upper esophagus appear within normal limits. Thyroid gland is within normal limits. Submandibular glands and parotid glands appear within normal limits. Left level 1B lymph node is grossly unchanged measuring 9 x 7 mm in size. No other pathologically enlarged cervical lymph nodes. Left internal jugular Port-A-Cath remains in place and unchanged in position. There is  emphysematous change of the lungs. Visualized lung apices are clear. There are degenerative changes of the cervical spine and right shoulder. No suspicious lytic or sclerotic bony lesion.     Impression: 1. Stable exam with soft tissue thickening involving the left side of the oropharynx and hypopharynx to the level of the area epiglottic folds, unchanged in appearance from prior study. 2. Stable borderline size 9 x 7 mm left level 1B lymph node. 3. Emphysema Electronically Signed: Poncho Macias MD  6/9/2025 9:40 AM EDT  Workstation ID: [id number]    CT Chest With Contrast Diagnostic  Result Date: 6/8/2025  CT CHEST W CONTRAST DIAGNOSTIC Date of Exam: 6/8/2025 3:59 PM EDT Indication: Lymphoma. Chest pain. Squamous cell lung cancer. Comparison: 3/29/2025 Technique: Axial CT images were obtained of the chest after the uneventful intravenous administration of iodinated contrast.  Reconstructed coronal and sagittal images were also obtained. Automated exposure control and iterative construction methods were  used. Findings: Left-sided Port-A-Cath tip is in the lower SVC. Patient is status post TAVR. Mitral valve annulus calcifications are present. No pleural or pericardial effusion is seen. There are calcified subcarinal and right hilar lymph nodes compatible with old granulomatous disease. There is new right internal mammary adenopathy measuring 1.3 cm. There are multiple small mediastinal lymph nodes in the upper mediastinum that may be slightly more prominent. Prior study is degraded by the lack of contrast. No definite hilar adenopathy is seen. Surgical clips are noted in the left axilla. There is left retropectoral adenopathy with one of the larger nodes measuring 1.2 cm. This appears new from prior. The axillae on both sides are partially excluded from the field-of-view. Comparison with the patient's more recent prior outside study would be beneficial. Upper abdominal images show probable splenomegaly (incompletely  visualized.) Gastrostomy tube is in good position. Gallbladder is surgically absent. Lung windows again show pulmonary emphysema. There is a right lower lobe calcified granuloma. A bilobed nodule in the right lower lobe has cavitated since the prior study. It now measures 1.8 x 1.3 cm. This lesion previously measured about 1.9 x 1.0 cm, but there is motion on the prior study. There are some small adjacent satellite nodules again identified. Previously described nodule in the medial right lower lobe is no longer visualized. There is some mild scarring or atelectasis in the right middle lobe. The left lung is clear. No evidence of pneumonia on either side of the chest. No pneumothorax. There is a new compression fracture at T6 with at least 80% loss of height centrally. No retropulsion. There is a new superior endplate fracture at T7 with at least 20% loss of height and no retropulsion. There is also a new superior endplate fracture at  T12 with 10 to 20% loss of height centrally and no retropulsion. A pain pump is present in the right flank.     1.There is new right internal mammary adenopathy. There is also new left retropectoral adenopathy. Comparison with the patient's more recent prior outside study would be beneficial. 2.New compression fractures at T6, T7 and T12 as described. No retropulsion. 3.Interval cavitation of the bilobed nodule in the right lower lobe since the prior study. There are some small adjacent satellite nodules. 4.Pulmonary emphysema. 5.Probable splenomegaly. 6.Additional findings as noted above. Electronically Signed: Des Carlson MD  6/8/2025 8:50 PM EDT  Workstation ID: [id number]    XR Chest 1 View  Result Date: 6/7/2025  XR CHEST 1 VW Date of Exam: 6/7/2025 9:15 PM EDT Indication: chest pain Comparison: 6/5/2025 Findings: Port-A-Cath tip remains in the SVC. Patient is status post TAVR. Heart size is normal. Mitral valve annulus calcifications are present. Emphysema is present. Lungs are  clear except for granulomatous calcification at the right base. Pulmonary vascularity is normal. There is no pneumothorax.     Emphysema. No active disease. Electronically Signed: Des Carlson MD  6/7/2025 9:24 PM EDT  Workstation ID: [id number]    XR Chest 1 View  Result Date: 6/5/2025  XR CHEST 1 VW Date of Exam: 6/5/2025 10:43 AM EDT Indication: Chest Pain Triage Protocol Comparison: Chest x-ray 5/27/2025 Findings: The heart is stable in size. There is a left chest wall port catheter with the distal tip overlying the distal superior vena cava. Aortic vascular stent is present. There is a calcified granuloma at the right lung base. No evidence for pneumothorax or pleural effusion. Surgical clips are noted in the left axillary region.     Impression: Stable chest examination without acute cardiopulmonary process. Electronically Signed: Mikayla Linares MD  6/5/2025 11:06 AM EDT  Workstation ID: [id number]      LAB RESULTS:      Lab 06/27/25  0523 06/26/25  0511 06/25/25  0649 06/24/25  1332 06/24/25  0915 06/24/25  0850   WBC 9.10 8.86 6.92  --   --  17.42*   HEMOGLOBIN 12.5 11.9* 10.8*  --   --  12.1   HEMATOCRIT 41.3 40.3 37.1  --   --  40.4   PLATELETS 242 201 151  --   --  251   NEUTROS ABS 7.93* 7.43* 5.93  --   --  16.02*   IMMATURE GRANS (ABS) 0.11* 0.08* 0.05  --   --  0.19*   LYMPHS ABS 0.61* 0.80 0.44*  --   --  0.62*   MONOS ABS 0.43 0.52 0.48  --   --  0.56   EOS ABS 0.00 0.01 0.01  --   --  0.00   MCV 80.8 84.3 84.9  --   --  82.8   LACTATE  --   --   --  0.9 2.5* 2.8*         Lab 06/27/25  0523 06/26/25  0511 06/25/25  0649 06/24/25  0850   SODIUM 131* 133* 133* 131*   POTASSIUM 3.9 3.7 3.6 4.4   CHLORIDE 94* 96* 99 92*   CO2 24.9 23.8 25.0 25.3   ANION GAP 12.1 13.2 9.0 13.7   BUN 11.4 5.4* 7.1 9.2   CREATININE 0.31* 0.25* 0.23* 0.44*   EGFR 121.4 127.9 130.5 111.6   GLUCOSE 318* 97 143* 267*   CALCIUM 8.0* 8.3* 8.0* 8.9   MAGNESIUM 2.0 1.4* 1.4* 1.3*   PHOSPHORUS 5.3* 2.3* 2.7  --    TSH  --   --    --  1.870         Lab 06/27/25  0523 06/26/25  0511 06/25/25  0649 06/24/25  0850   TOTAL PROTEIN 5.4* 5.4* 5.1* 6.4   ALBUMIN 2.2* 2.1* 2.0* 2.8*   GLOBULIN 3.2 3.3 3.1 3.6   ALT (SGPT) 6 7 7 9   AST (SGOT) 8 14 13 16   BILIRUBIN 0.2 0.3 0.2 0.4   ALK PHOS 100 125* 103 146*         Lab 06/24/25  1019 06/24/25  0850   HSTROP T 83* 44*                 Brief Urine Lab Results  (Last result in the past 365 days)        Color   Clarity   Blood   Leuk Est   Nitrite   Protein   CREAT   Urine HCG        06/24/25 1140 Dark Yellow   Cloudy   Negative   Trace   Negative   100 mg/dL (2+)                 Microbiology Results (last 10 days)       Procedure Component Value - Date/Time    Wound Culture - Swab, Leg, Left [[id number]]  (Abnormal)  (Susceptibility) Collected: 06/24/25 0916    Lab Status: Final result Specimen: Swab from Leg, Left Updated: 06/26/25 0935     Wound Culture Heavy growth (4+) Enterobacter cloacae complex     Comment:   This organism may develop resistance during prolonged therapy with 3rd generation cephalosporins (e.g. ceftriaxone) as a result of de-repression of AmpC B-lactamase.  Ceftriaxone may be a reasonable treatment option for uncomplicated cystitis or other lower severity infections when susceptibility is demonstrated.         Moderate growth (3+) Klebsiella pneumoniae ssp pneumoniae      Scant growth (1+) Normal Skin Vanna     Gram Stain Moderate (3+) Gram negative bacilli      Few (2+) Gram positive cocci in pairs    Susceptibility        Enterobacter cloacae complex      MARTHA      Cefepime Susceptible      Cefotaxime Susceptible      Ceftazidime Susceptible      Ciprofloxacin Intermediate      Gentamicin Susceptible      Levofloxacin Intermediate      Trimethoprim + Sulfamethoxazole Susceptible                       Susceptibility        Klebsiella pneumoniae ssp pneumoniae      MARTHA      Amoxicillin + Clavulanate Susceptible      Ampicillin Resistant      Ampicillin + Sulbactam Susceptible       Cefazolin (Non Urine) Susceptible      Cefepime Susceptible      Ceftazidime Susceptible      Ceftriaxone Susceptible      Cefuroxime axetil Susceptible      Gentamicin Susceptible      Levofloxacin Intermediate      Piperacillin + Tazobactam Susceptible      Tetracycline Susceptible      Trimethoprim + Sulfamethoxazole Susceptible                       Susceptibility Comments       Enterobacter cloacae complex    Cefotaxime susceptibility can be used as a surrogate for ceftriaxone susceptibility      Klebsiella pneumoniae ssp pneumoniae    With the exception of urinary-sourced infections, aminoglycosides should not be used as monotherapy.               COVID-19, FLU A/B, RSV PCR 1 HR TAT - Swab, Nasopharynx [[id number]]  (Normal) Collected: 06/24/25 0916    Lab Status: Final result Specimen: Swab from Nasopharynx Updated: 06/24/25 1006     COVID19 Not Detected     Influenza A PCR Not Detected     Influenza B PCR Not Detected     RSV, PCR Not Detected    Narrative:      Fact sheet for providers: https://www.fda.gov/media/938136/download    Fact sheet for patients: https://www.fda.gov/media/069319/download    Test performed by PCR.    Blood Culture - Blood, Arm, Right [[id number]]  (Normal) Collected: 06/24/25 0850    Lab Status: Final result Specimen: Blood from Arm, Right Updated: 06/29/25 0915     Blood Culture No growth at 5 days    Blood Culture - Blood, Arm, Left [[id number]]  (Normal) Collected: 06/24/25 0850    Lab Status: Final result Specimen: Blood from Arm, Left Updated: 06/29/25 0915     Blood Culture No growth at 5 days          Results for orders placed during the hospital encounter of 02/04/25    Doppler Arterial Single Level Upper Extremity - Bilateral CAR    Interpretation Summary    Normal arterial pressures on the right. Normal digital pressures noted on the right.    Normal arterial pressures on the left. Normal digital pressures noted on the left.    Normal arterial evaluation of both upper  extremities.    Results for orders placed during the hospital encounter of 02/04/25    Doppler Arterial Single Level Upper Extremity - Bilateral CAR    Interpretation Summary    Normal arterial pressures on the right. Normal digital pressures noted on the right.    Normal arterial pressures on the left. Normal digital pressures noted on the left.    Normal arterial evaluation of both upper extremities.    Results for orders placed during the hospital encounter of 03/28/25    Adult Transthoracic Echo Limited W/ Cont if Necessary Per Protocol    Interpretation Summary    Left ventricular ejection fraction appears to be 61 - 65%.    Left ventricular wall thickness is consistent with mild to moderate septal asymmetric hypertrophy.    Left ventricular diastolic function is consistent with (grade I) impaired relaxation.    The left atrial cavity is mildly dilated.    There is a prosthetic aortic valve of unknown type present.    Estimated right ventricular systolic pressure from tricuspid regurgitation is normal (<35 mmHg).    There is a trivial pericardial effusion.        Electronically signed by Bull Davila MD, 06/30/25, 12:40 PM EDT.

## 2025-07-02 ENCOUNTER — APPOINTMENT (OUTPATIENT)
Dept: GENERAL RADIOLOGY | Facility: HOSPITAL | Age: 60
End: 2025-07-02
Payer: MEDICAID

## 2025-07-02 ENCOUNTER — HOSPITAL ENCOUNTER (EMERGENCY)
Facility: HOSPITAL | Age: 60
Discharge: HOME | End: 2025-07-02
Attending: EMERGENCY MEDICINE | Admitting: EMERGENCY MEDICINE
Payer: MEDICAID

## 2025-07-02 VITALS
BODY MASS INDEX: 20.08 KG/M2 | SYSTOLIC BLOOD PRESSURE: 92 MMHG | WEIGHT: 113.32 LBS | TEMPERATURE: 98.8 F | RESPIRATION RATE: 19 BRPM | OXYGEN SATURATION: 96 % | HEIGHT: 63 IN | DIASTOLIC BLOOD PRESSURE: 77 MMHG | HEART RATE: 102 BPM

## 2025-07-02 DIAGNOSIS — R07.89 ATYPICAL CHEST PAIN: Primary | ICD-10-CM

## 2025-07-02 DIAGNOSIS — M79.89 SWELLING OF LOWER EXTREMITY: ICD-10-CM

## 2025-07-02 LAB
ALBUMIN SERPL-MCNC: 2.8 G/DL (ref 3.5–5.2)
ALBUMIN/GLOB SERPL: 0.8 G/DL
ALP SERPL-CCNC: 134 U/L (ref 39–117)
ALT SERPL W P-5'-P-CCNC: 15 U/L (ref 1–33)
ANION GAP SERPL CALCULATED.3IONS-SCNC: 13.4 MMOL/L (ref 5–15)
AST SERPL-CCNC: 20 U/L (ref 1–32)
BASOPHILS # BLD AUTO: 0.02 10*3/MM3 (ref 0–0.2)
BASOPHILS NFR BLD AUTO: 0.2 % (ref 0–1.5)
BILIRUB SERPL-MCNC: 0.2 MG/DL (ref 0–1.2)
BUN SERPL-MCNC: 11.6 MG/DL (ref 6–20)
BUN/CREAT SERPL: 34.1 (ref 7–25)
CALCIUM SPEC-SCNC: 8.6 MG/DL (ref 8.6–10.5)
CHLORIDE SERPL-SCNC: 90 MMOL/L (ref 98–107)
CO2 SERPL-SCNC: 23.6 MMOL/L (ref 22–29)
CREAT SERPL-MCNC: 0.34 MG/DL (ref 0.57–1)
DEPRECATED RDW RBC AUTO: 46.5 FL (ref 37–54)
EGFRCR SERPLBLD CKD-EPI 2021: 118.7 ML/MIN/1.73
EOSINOPHIL # BLD AUTO: 0 10*3/MM3 (ref 0–0.4)
EOSINOPHIL NFR BLD AUTO: 0 % (ref 0.3–6.2)
ERYTHROCYTE [DISTWIDTH] IN BLOOD BY AUTOMATED COUNT: 15.8 % (ref 12.3–15.4)
GLOBULIN UR ELPH-MCNC: 3.4 GM/DL
GLUCOSE SERPL-MCNC: 290 MG/DL (ref 65–99)
HCT VFR BLD AUTO: 38.1 % (ref 34–46.6)
HGB BLD-MCNC: 11.6 G/DL (ref 12–15.9)
HOLD SPECIMEN: NORMAL
HOLD SPECIMEN: NORMAL
IMM GRANULOCYTES # BLD AUTO: 0.11 10*3/MM3 (ref 0–0.05)
IMM GRANULOCYTES NFR BLD AUTO: 0.9 % (ref 0–0.5)
LIPASE SERPL-CCNC: 27 U/L (ref 13–60)
LYMPHOCYTES # BLD AUTO: 0.29 10*3/MM3 (ref 0.7–3.1)
LYMPHOCYTES NFR BLD AUTO: 2.4 % (ref 19.6–45.3)
MAGNESIUM SERPL-MCNC: 1.5 MG/DL (ref 1.6–2.6)
MCH RBC QN AUTO: 24.7 PG (ref 26.6–33)
MCHC RBC AUTO-ENTMCNC: 30.4 G/DL (ref 31.5–35.7)
MCV RBC AUTO: 81.2 FL (ref 79–97)
MONOCYTES # BLD AUTO: 0.41 10*3/MM3 (ref 0.1–0.9)
MONOCYTES NFR BLD AUTO: 3.4 % (ref 5–12)
NEUTROPHILS NFR BLD AUTO: 11.32 10*3/MM3 (ref 1.7–7)
NEUTROPHILS NFR BLD AUTO: 93.1 % (ref 42.7–76)
NRBC BLD AUTO-RTO: 0 /100 WBC (ref 0–0.2)
NT-PROBNP SERPL-MCNC: 2127 PG/ML (ref 0–900)
PLATELET # BLD AUTO: 230 10*3/MM3 (ref 140–450)
PMV BLD AUTO: 11 FL (ref 6–12)
POTASSIUM SERPL-SCNC: 4.4 MMOL/L (ref 3.5–5.2)
PROT SERPL-MCNC: 6.2 G/DL (ref 6–8.5)
RBC # BLD AUTO: 4.69 10*6/MM3 (ref 3.77–5.28)
SODIUM SERPL-SCNC: 127 MMOL/L (ref 136–145)
TROPONIN T SERPL HS-MCNC: 22 NG/L
WBC NRBC COR # BLD AUTO: 12.15 10*3/MM3 (ref 3.4–10.8)
WHOLE BLOOD HOLD COAG: NORMAL
WHOLE BLOOD HOLD SPECIMEN: NORMAL

## 2025-07-02 PROCEDURE — 93005 ELECTROCARDIOGRAM TRACING: CPT | Performed by: EMERGENCY MEDICINE

## 2025-07-02 PROCEDURE — 71045 X-RAY EXAM CHEST 1 VIEW: CPT

## 2025-07-02 PROCEDURE — 84484 ASSAY OF TROPONIN QUANT: CPT | Performed by: EMERGENCY MEDICINE

## 2025-07-02 PROCEDURE — 80053 COMPREHEN METABOLIC PANEL: CPT | Performed by: EMERGENCY MEDICINE

## 2025-07-02 PROCEDURE — 36415 COLL VENOUS BLD VENIPUNCTURE: CPT | Performed by: EMERGENCY MEDICINE

## 2025-07-02 PROCEDURE — 36415 COLL VENOUS BLD VENIPUNCTURE: CPT

## 2025-07-02 PROCEDURE — 93005 ELECTROCARDIOGRAM TRACING: CPT

## 2025-07-02 PROCEDURE — 83880 ASSAY OF NATRIURETIC PEPTIDE: CPT | Performed by: EMERGENCY MEDICINE

## 2025-07-02 PROCEDURE — 85025 COMPLETE CBC W/AUTO DIFF WBC: CPT | Performed by: EMERGENCY MEDICINE

## 2025-07-02 PROCEDURE — 83735 ASSAY OF MAGNESIUM: CPT | Performed by: EMERGENCY MEDICINE

## 2025-07-02 PROCEDURE — 83690 ASSAY OF LIPASE: CPT | Performed by: EMERGENCY MEDICINE

## 2025-07-02 PROCEDURE — 93010 ELECTROCARDIOGRAM REPORT: CPT | Performed by: SPECIALIST

## 2025-07-02 PROCEDURE — 99284 EMERGENCY DEPT VISIT MOD MDM: CPT

## 2025-07-02 RX ORDER — SODIUM CHLORIDE 0.9 % (FLUSH) 0.9 %
10 SYRINGE (ML) INJECTION AS NEEDED
Status: DISCONTINUED | OUTPATIENT
Start: 2025-07-02 | End: 2025-07-02 | Stop reason: HOSPADM

## 2025-07-02 RX ORDER — ASPIRIN 81 MG/1
324 TABLET, CHEWABLE ORAL ONCE
Status: DISCONTINUED | OUTPATIENT
Start: 2025-07-02 | End: 2025-07-02 | Stop reason: HOSPADM

## 2025-07-02 NOTE — ED PROVIDER NOTES
Time: 12:21 PM EDT  Date of encounter:  7/2/2025  Independent Historian/Clinical History and Information was obtained by:   Patient    History is limited by: N/A    Chief Complaint: Right arm/chest pain, leg swelling      History of Present Illness:  Patient is a 59 y.o. year old female who presents to the emergency department for evaluation of right arm and chest pain and leg swelling.  Reports that she has not felt well for a a while now.  She was just recently in the hospital for cellulitis in her legs but left the hospital AMA.  She reports that she has been having pain in her right shoulder and chest symptoms and ongoing.  She was diagnosed with shingles recently as well in her right arm.  States it just hurts occasionally but is not hurting all the time.  Denies nausea, vomiting.  States that she chronically has lower extremity swelling but they have been weeping more.  No other complaints this time.      Patient Care Team  Primary Care Provider: Provider, No Known    Past Medical History:     Allergies   Allergen Reactions    Amoxicillin Shortness Of Breath     Has tolerated Cefazolin, Ceftriaxone, and Cefepime -Jermaine Montez, [facility]    Ceclor [Cefaclor] Shortness Of Breath     Has tolerated Cefazolin, Ceftriaxone, and Cefepime -Jermaine Melida, [facility]      Penicillins Shortness Of Breath     Has tolerated Cefazolin, Ceftriaxone, and Cefepime -Jermaine Melida, [facility]    Sulfa Antibiotics Rash    Valium [Diazepam] Mental Status Change     DEPRESSED    Ambien [Zolpidem] Mental Status Change    Aspirin GI Intolerance    Ativan [Lorazepam] Mental Status Change    Benadryl [Diphenhydramine] Anxiety     AND MAKES HER HYPER    Biaxin [Clarithromycin] Unknown - Low Severity    Cephalexin Unknown - Low Severity    Clindamycin Unknown - Low Severity    Compazine [Prochlorperazine Edisylate] Rash    Contrast Dye (Echo Or Unknown Ct/Mr) Other (See Comments)     Caused pain in her arm    Doxycycline Rash    Nsaids GI  Intolerance    Phenergan [Promethazine Hcl] GI Intolerance    Promethazine GI Intolerance    Vancomycin Itching     Past Medical History:   Diagnosis Date    Anesthesia     REPORTS HAS GOT REAL EMOTIONAL AND HAS BECOME ANGRY BEFORE    Anxiety     Aortic stenosis, severe     HAS BIO VALVE REPLACED    Arthritis     Asthma     Back pain     Blood clotting disorder     test to find out what type    Cancer     CHF (congestive heart failure)     Chronic low back pain with sciatica     Chronic pain disorder     COPD (chronic obstructive pulmonary disease)     Coronary artery disease     looking to follow with Cardio    Diabetes mellitus     TYPE 2    Dyspnea on effort     Fatty liver     GERD (gastroesophageal reflux disease)     H/O lumpectomy     H/O: hysterectomy     Hiatal hernia     History of degenerative disc disease     History of kidney stones     History of pulmonary embolus (PE)     History of transfusion     AS A CHILD NO TRANSFUSION REACTION    Hodgkin's lymphoma     5/19/23  5 YEARS SINCE LAST CHEMO TX    Hypertension     Immobility     Lupus     Nausea vomiting and diarrhea 03/07/2024    Panic disorder     Pelvic pain     Peripheral neuropathy     Personal history of DVT (deep vein thrombosis)     over 20 years    PONV (postoperative nausea and vomiting)     Presence of intrathecal pump     PTSD (post-traumatic stress disorder)     Sacroiliac joint disease     SI (sacroiliac) joint inflammation     Venous insufficiency      Past Surgical History:   Procedure Laterality Date    ABDOMINAL SURGERY      BACK SURGERY      SPINAL FUSION     BACK SURGERY      BLADDER REPAIR      BRONCHOSCOPY WITH ION ROBOTIC ASSIST N/A 4/22/2025    Procedure: BRONCHOSCOPY WITH ION ROBOT: REBUS, EBUS, NEEDLE ASPIRATES, BIOPSIES, BRUSHINGS, BAL, WASHINGS: insertion of lighted robot navigated instrument to view inside the lung;  Surgeon: Enzo Segal MD;  Location: [facility] [place];  Service: Robotics - Pulmonary;  Laterality: N/A;     CARDIAC CATHETERIZATION N/A 03/06/2019    Procedure: Valvuloplasty;  Surgeon: Wayne Johnson MD;  Location: [facility] INVASIVE LOCATION;  Service: Cardiology    CARDIAC CATHETERIZATION      CARDIAC CATHETERIZATION Left 05/31/2023    Procedure: Cardiac Catheterization/Vascular Study;  Surgeon: Poncho Reid MD;  Location: [facility] CATH INVASIVE LOCATION;  Service: Cardiovascular;  Laterality: Left;    CARDIAC SURGERY      Mechanical valve    CARDIAC VALVE REPLACEMENT  2021    CHOLECYSTECTOMY      COLONOSCOPY N/A 12/29/2021    Procedure: COLONOSCOPY;  Surgeon: Karine Orlando MD;  Location: [facility] ENDOSCOPY;  Service: Gastroenterology;  Laterality: N/A;  POOR PREP    COLONOSCOPY N/A 04/13/2022    Procedure: COLONOSCOPY WITH POLYPECTOMY;  Surgeon: Karine Orlando MD;  Location: [facility] ENDOSCOPY;  Service: Gastroenterology;  Laterality: N/A;  COLON POLYP, HEMORRHOIDS, POOR PREP    COLONOSCOPY      DIRECT LARYNGOSCOPY, ESOPHAGOSCOPY, BRONCHOSCOPY N/A 05/22/2023    Procedure: DIRECT LARYNGOSCOPY WITH BIOPSIES , ESOPHAGOSCOPY, BRONCHOSCOPY;  Surgeon: Ronnie Mcgarry MD;  [place] [facility] [place] [facility]  Service: ENT;  Laterality: N/A;    ENDOSCOPY N/A 12/29/2021    Procedure: ESOPHAGOGASTRODUODENOSCOPY;  Surgeon: Karine Orlando MD;  Location: [facility] ENDOSCOPY;  Service: Gastroenterology;  Laterality: N/A;  ESOPHAGITIS, GASTRITIS, HIATAL HERNIA    ENDOSCOPY      ENDOSCOPY N/A 04/16/2024    Procedure: ESOPHAGOGASTRODUODENOSCOPY WITH BIOPSIES;  Surgeon: Karine Orlando MD;  Location: [facility] ENDOSCOPY;  Service: Gastroenterology;  Laterality: N/A;  ESOPHAGITIS, HIATAL HERNIA    ENDOSCOPY W/ PEG TUBE PLACEMENT N/A 12/10/2024    Procedure: ESOPHAGOGASTRODUODENOSCOPY WITH PERCUTANEOUS ENDOSCOPIC GASTROSTOMY TUBE INSERTION WITH ANESTHESIA;  Surgeon: Rodger Ortega MD;  Location: [facility] ENDOSCOPY;  Service: Gastroenterology;  Laterality: N/A;  PEG TUBE INSERTION     HYSTERECTOMY      LYMPHADENECTOMY      PAIN PUMP INSERTION/REVISION N/A 10/18/2019    Procedure: PAIN PUMP INSERTION [facility] 10-18-19 PEDRO;  Surgeon: Horace Rios MD;  Location: [facility] OR;  Service: Pain Management    PAIN PUMP INSERTION/REVISION N/A 11/23/2020    Procedure: pain pump removal;  Surgeon: Horace Rios MD;  Location: [facility] MAIN OR;  Service: Pain Management;  Laterality: N/A;    PEG TUBE INSERTION N/A 07/26/2023    Procedure: PERCUTANEOUS ENDOSCOPIC GASTROSTOMY TUBE INSERTION;  Surgeon: Kody Mercer MD;  Location: [facility] ENDOSCOPY;  Service: General;  Laterality: N/A;  SUCCESSFUL PEG TUBE PLACE PLACEMENT    PORTACATH PLACEMENT      IN LEFT CHEST REPORTS THAT IT IS NOT FUNCTIONING    SKIN BIOPSY      TONSILLECTOMY      TUBAL ABDOMINAL LIGATION      TUMOR REMOVAL      vaginal /anal area     Family History   Problem Relation Age of Onset    Heart failure Mother     Anxiety disorder Mother     Prostate cancer Father     Depression Sister     Bipolar disorder Sister     Pancreatic cancer Sister     ADD / ADHD Brother     Thyroid cancer Maternal Grandmother     Pancreatic cancer Paternal Grandmother     ADD / ADHD Grandson     ADD / ADHD Granddaughter     ADD / ADHD Nephew     Malig Hyperthermia Neg Hx        Home Medications:  Prior to Admission medications    Medication Sig Start Date End Date Taking? Authorizing Provider   ALPRAZolam (XANAX) 0.25 MG tablet Take 1 tablet by mouth 2 (Two) Times a Day. 11/25/24   Nette Jiménez MD   budesonide (PULMICORT) 0.5 MG/2ML nebulizer solution Take 2 mL by nebulization 2 (Two) Times a Day.    Nette Jiménez MD   famotidine (Pepcid) 40 MG tablet Take 1 tablet by mouth every night at bedtime. 3/27/25   Sobeida Espinosa APRN   furosemide (LASIX) 20 MG tablet Take 1 tablet by mouth Daily.    Nette Jiménez MD   HYDROcodone-acetaminophen (NORCO)  MG per tablet Take 1 tablet by mouth Every 4 (Four) Hours As Needed  for Mild Pain. 6/17/25   Nette Jiménez MD   Insulin Lispro, 1 Unit Dial, (HUMALOG) 100 UNIT/ML solution pen-injector Inject 12 Units under the skin into the appropriate area as directed Daily. 2/14/25   Nette Jiménez MD   ipratropium-albuterol (DUO-NEB) 0.5-2.5 mg/3 ml nebulizer Take 3 mL by nebulization 4 (Four) Times a Day As Needed for Wheezing for up to 30 days. 4/9/25 6/24/25  Xochitl Estrada APRN   loperamide (IMODIUM) 2 MG capsule Take 1 capsule by mouth 4 (Four) Times a Day As Needed for Diarrhea (2 with 1st episode of diarrhea and 1 with each additonal episode, max of 8 per day.). 4/21/25   Sobeida Espinosa APRN   melatonin 5 MG tablet tablet Take 1 tablet by mouth Every Night. 6/11/25   Nette Jiménez MD   metoprolol succinate XL (TOPROL-XL) 25 MG 24 hr tablet Take 1 tablet by mouth every night at bedtime. 4/17/25   Fredis Chris MD   nystatin (MYCOSTATIN) 100,000 unit/mL suspension Take 4 mL by mouth 4 (Four) Times a Day. For 14 days 6/11/25   Nette Jiménez MD   ondansetron ODT (ZOFRAN-ODT) 8 MG disintegrating tablet Place 1 tablet on the tongue Every 8 (Eight) Hours As Needed for Nausea or Vomiting. 3/27/25   Nette Jiménez MD   oxyCODONE (ROXICODONE) 10 MG tablet Take 1 tablet by mouth Every 4 (Four) Hours As Needed for Pain. 6/3/25   Nette Jiménez MD   pain patient supplied pump by Intrathecal route Continuous. Type of Medication: Hydromorphone 15 mg/ml and Bupivacaine 0.5 mg/ml  [employer] [phone]  Provider:Dr. Boudreaux  Provider number: [phone]  Basal Dose: Hydromorphone 7.518 mg/day Bupivacaine 0.52684 mg/day  Pump capacity: 40ml  ( MAX of Hydromorphone 9.456 mg/ day; Bupivacaine 0.00455 mg /day)  Bolus Dose:Hydromorphone 0.500 mg, Bupivacaine 0.45038 mg  Max activations: 4 per day  Lockout 3 hours. 1 Bolus per every 3 hours   Pump manufacture:[employer]  Refill pump before 07/06/25    Manny Boudreaux MD   PARoxetine (PAXIL) 20 MG tablet  "Take 1 tablet by mouth Every Morning. 3/27/25   ProviderNette MD   predniSONE (DELTASONE) 5 MG tablet Take 3 tablets by mouth 2 (Two) Times a Day. 15mg in am 15mg in pm  Patient taking differently: Take 3 tablets by mouth 2 (Two) Times a Day. 5/5/25   Virgil Navarro MD   triamcinolone (KENALOG) 0.1 % cream Apply 1 Application topically to the appropriate area as directed 2 (Two) Times a Day. For 14 days 6/11/25   ProviderNette MD        Social History:   Social History     Tobacco Use    Smoking status: Every Day     Current packs/day: 2.00     Average packs/day: 2.0 packs/day for 46.5 years (93.0 ttl pk-yrs)     Types: Cigarettes     Start date: 1979     Passive exposure: Current    Smokeless tobacco: Never    Tobacco comments:     Currently smoking 1.5 daily. 04--js   Vaping Use    Vaping status: Never Used   Substance Use Topics    Alcohol use: Never    Drug use: Never         Review of Systems:  Review of Systems     Physical Exam:  BP (!) 86/73   Pulse 106   Temp 99.1 °F (37.3 °C) (Oral)   Resp 17   Ht 160 cm (63\")   Wt 51.4 kg (113 lb 5.1 oz)   LMP  (LMP Unknown)   SpO2 96%   BMI 20.07 kg/m²     Physical Exam  Vitals and nursing note reviewed.   Constitutional:       Appearance: Normal appearance.   HENT:      Head: Normocephalic and atraumatic.   Eyes:      General: No scleral icterus.  Cardiovascular:      Rate and Rhythm: Normal rate and regular rhythm.      Heart sounds: Normal heart sounds.   Pulmonary:      Effort: Pulmonary effort is normal.      Breath sounds: Normal breath sounds.   Abdominal:      Palpations: Abdomen is soft.      Tenderness: There is no abdominal tenderness.      Comments: G-tube in place   Musculoskeletal:         General: Normal range of motion.      Cervical back: Normal range of motion.      Right lower leg: Edema present.      Left lower leg: Edema present.   Skin:     Findings: No rash.   Neurological:      General: No focal deficit present.     "  Mental Status: She is alert.                    Medical Decision Making:      Comorbidities that affect care:  COPD, diabetes, lymphoma      External Notes reviewed:    Reviewed discharge summary from 6/27/2020      The following orders were placed and all results were independently analyzed by me:  Orders Placed This Encounter   Procedures    XR Chest 1 View    [place] Draw    High Sensitivity Troponin T    Comprehensive Metabolic Panel    Lipase    BNP    Magnesium    CBC Auto Differential    High Sensitivity Troponin T 1Hr    NPO Diet NPO Type: Strict NPO    Undress & Gown    Continuous Pulse Oximetry    Oxygen Therapy- Nasal Cannula; Titrate 1-6 LPM Per SpO2; 90 - 95%    ECG 12 Lead ED Triage Standing Order; Chest Pain    ECG 12 Lead ED Triage Standing Order; Chest Pain    Insert Peripheral IV    CBC & Differential    Green Top (Gel)    Lavender Top    Gold Top - SST    Light Blue Top       Medications Given in the Emergency Department:  Medications   sodium chloride 0.9 % flush 10 mL (has no administration in time range)   aspirin chewable tablet 324 mg (324 mg Oral Not Given 7/2/25 1203)        ED Course:    ED Course as of 07/02/25 1430   Wed Jul 02, 2025   1224 EKG inter by me  Time: 1204  Heart rate 121  Sinus tach, LVH, left bundle branch block [MA]      ED Course User Index  [MA] Jimmy Hudson MD       Labs:    Lab Results (last 24 hours)       Procedure Component Value Units Date/Time    High Sensitivity Troponin T [[id number]]  (Abnormal) Collected: 07/02/25 1334    Specimen: Blood from Hand, Left Updated: 07/02/25 1402     HS Troponin T 22 ng/L     Narrative:      High Sensitive Troponin T Reference Range:  <14.0 ng/L- Negative Female for AMI  <22.0 ng/L- Negative Male for AMI  >=14 - Abnormal Female indicating possible myocardial injury.  >=22 - Abnormal Male indicating possible myocardial injury.   Clinicians would have to utilize clinical acumen, EKG, Troponin, and serial changes to determine  if it is an Acute Myocardial Infarction or myocardial injury due to an underlying chronic condition.         CBC & Differential [[id number]]  (Abnormal) Collected: 07/02/25 1334    Specimen: Blood from Hand, Left Updated: 07/02/25 1341    Narrative:      The following orders were created for panel order CBC & Differential.  Procedure                               Abnormality         Status                     ---------                               -----------         ------                     CBC Auto Differential[[id number]]        Abnormal            Final result                 Please view results for these tests on the individual orders.    Comprehensive Metabolic Panel [[id number]]  (Abnormal) Collected: 07/02/25 1334    Specimen: Blood from Hand, Left Updated: 07/02/25 1402     Glucose 290 mg/dL      BUN 11.6 mg/dL      Creatinine 0.34 mg/dL      Sodium 127 mmol/L      Potassium 4.4 mmol/L      Chloride 90 mmol/L      CO2 23.6 mmol/L      Calcium 8.6 mg/dL      Total Protein 6.2 g/dL      Albumin 2.8 g/dL      ALT (SGPT) 15 U/L      AST (SGOT) 20 U/L      Alkaline Phosphatase 134 U/L      Total Bilirubin 0.2 mg/dL      Globulin 3.4 gm/dL      A/G Ratio 0.8 g/dL      BUN/Creatinine Ratio 34.1     Anion Gap 13.4 mmol/L      eGFR 118.7 mL/min/1.73     Narrative:      GFR Categories in Chronic Kidney Disease (CKD)              GFR Category          GFR (mL/min/1.73)    Interpretation  G1                    90 or greater        Normal or high (1)  G2                    60-89                Mild decrease (1)  G3a                   45-59                Mild to moderate decrease  G3b                   30-44                Moderate to severe decrease  G4                    15-29                Severe decrease  G5                    14 or less           Kidney failure    (1)In the absence of evidence of kidney disease, neither GFR category G1 or G2 fulfill the criteria for CKD.    eGFR calculation 2021 CKD-EPI creatinine  equation, which does not include race as a factor    Lipase [[id number]]  (Normal) Collected: 07/02/25 1334    Specimen: Blood from Hand, Left Updated: 07/02/25 1402     Lipase 27 U/L     BNP [[id number]]  (Abnormal) Collected: 07/02/25 1334    Specimen: Blood from Hand, Left Updated: 07/02/25 1359     proBNP 2,127.0 pg/mL     Narrative:      This assay is used as an aid in the diagnosis of individuals suspected of having heart failure. It can be used as an aid in the diagnosis of acute decompensated heart failure (ADHF) in patients presenting with signs and symptoms of ADHF to the emergency department (ED). In addition, NT-proBNP of <300 pg/mL indicates ADHF is not likely.    Age Range Result Interpretation  NT-proBNP Concentration (pg/mL:      <50             Positive            >450                   Gray                 300-450                    Negative             <300    50-75           Positive            >900                  Gray                300-900                  Negative            <300      >75             Positive            >1800                  Gray                300-1800                  Negative            <300    Magnesium [[id number]]  (Abnormal) Collected: 07/02/25 1334    Specimen: Blood from Hand, Left Updated: 07/02/25 1405     Magnesium 1.5 mg/dL     CBC Auto Differential [[id number]]  (Abnormal) Collected: 07/02/25 1334    Specimen: Blood from Hand, Left Updated: 07/02/25 1341     WBC 12.15 10*3/mm3      RBC 4.69 10*6/mm3      Hemoglobin 11.6 g/dL      Hematocrit 38.1 %      MCV 81.2 fL      MCH 24.7 pg      MCHC 30.4 g/dL      RDW 15.8 %      RDW-SD 46.5 fl      MPV 11.0 fL      Platelets 230 10*3/mm3      Neutrophil % 93.1 %      Lymphocyte % 2.4 %      Monocyte % 3.4 %      Eosinophil % 0.0 %      Basophil % 0.2 %      Immature Grans % 0.9 %      Neutrophils, Absolute 11.32 10*3/mm3      Lymphocytes, Absolute 0.29 10*3/mm3      Monocytes, Absolute 0.41 10*3/mm3      Eosinophils,  Absolute 0.00 10*3/mm3      Basophils, Absolute 0.02 10*3/mm3      Immature Grans, Absolute 0.11 10*3/mm3      nRBC 0.0 /100 WBC              Imaging:    XR Chest 1 View  Result Date: 7/2/2025  XR CHEST 1 VW Date of Exam: 7/2/2025 12:14 PM EDT Indication: Chest Pain Triage Protocol Comparison: AP chest x-ray 6/24/2025, CT chest 6/8/2025 Findings: Known cavitary right lower lobe nodule. Lungs otherwise appear clear. No pneumothorax or large pleural effusion is seen. Cardiomediastinal contours and left internal jugular central venous port catheter appear stable.     Impression: 1.No acute cardiopulmonary abnormality is identified. 2.Known cavitary right lower lobe nodule. Electronically Signed: Karine Miranda MD  7/2/2025 12:40 PM EDT  Workstation ID: [id number]        Differential Diagnosis and Discussion:    Chest Pain:  Based on the patient's signs and symptoms, I considered aortic dissection, myocardial infaction, pulmonary embolism, cardiac tamponade, pericarditis, pneumothorax, musculoskeletal chest pain and other differential diagnosis as an etiology of the patient's chest pain.   Wound Evaluation: Differential diagnosis includes but is not limited to laceration, abrasion, puncture, burn, ulcer, cellulitis, abscess, vasculitis, malignancy, and rash.    PROCEDURES:    Labs were collected in the emergency department and all labs were reviewed and interpreted by me.  X-ray were performed in the emergency department and all X-ray impressions were independently interpreted by me.  An EKG was performed and the EKG was interpreted by me.    ECG 12 Lead ED Triage Standing Order; Chest Pain   Preliminary Result   HEART RONF=966  bpm   RR Lsgjjsxk=120  ms   CO Ifxfsmij=240  ms   P Horizontal Axis=-8  deg   P Front Axis=59  deg   QRSD Gdbadolq=254  ms   QT Dwllzlzl=405  ms   PKxP=512  ms   QRS Axis=15  deg   T Wave Axis=150  deg   - ABNORMAL ECG -   Sinus tachycardia   Atrial premature complexes   Probable left atrial  enlargement   Left bundle branch block   ST elevation secondary to IVCD   Date and Time of Study:2025-07-02 12:04:33          Procedures    MDM     Amount and/or Complexity of Data Reviewed  Decide to obtain previous medical records or to obtain history from someone other than the patient: yes       Patient is a 59-year-old female who is chronically ill who presents with complaints of lower extremity swelling as well as reported chest pain.  Reports the pain is mainly in her right arm that goes up to her right neck.  Was recently diagnosed with shingles.  This appears to be where her pain is coming from.  She does have chronic lower extremity swelling and was recently admitted for the swelling and possible infection.  I evaluated the patient previously when she was admitted that time and the legs appear to be significantly improved.  She did sign out of the hospital several days ago and states that they have not really changed much since that time.  The legs are not as swollen as they have been in the past and labs appear to be stable at this time.  I recommend that she continue to follow-up as an outpatient at this point.  She is chronically ill and high risk for return.                Patient Care Considerations:          Consultants/Shared Management Plan:    None    Social Determinants of Health:    Patient is independent, reliable, and has access to care.       Disposition and Care Coordination:    Discharged: I considered escalation of care by admitting this patient to the hospital, however patient actually appears improved from recent hospital stay and  labs stable    I have explained the patient´s condition, diagnoses and treatment plan based on the information available to me at this time. I have answered questions and addressed any concerns. The patient has a good  understanding of the patient´s diagnosis, condition, and treatment plan as can be expected at this point. The vital signs have been stable. The  patient´s condition is stable and appropriate for discharge from the emergency department.      The patient will pursue further outpatient evaluation with the primary care physician or other designated or consulting physician as outlined in the discharge instructions. They are agreeable to this plan of care and follow-up instructions have been explained in detail. The patient has received these instructions in written format and have expressed an understanding of the discharge instructions. The patient is aware that any significant change in condition or worsening of symptoms should prompt an immediate return to this or the closest emergency department or call to 911.      Final diagnoses:   Atypical chest pain   Swelling of lower extremity        ED Disposition       ED Disposition   Discharge    Condition   Stable    Comment   --               This medical record created using voice recognition software.             Jimmy Hudson MD  07/02/25 [id number]

## 2025-07-03 LAB
QT INTERVAL: 332 MS
QTC INTERVAL: 471 MS

## 2025-07-10 ENCOUNTER — HOSPITAL ENCOUNTER (OUTPATIENT)
Facility: HOSPITAL | Age: 60
Setting detail: OBSERVATION
Discharge: HOME | End: 2025-07-15
Attending: EMERGENCY MEDICINE | Admitting: INTERNAL MEDICINE
Payer: MEDICAID

## 2025-07-10 ENCOUNTER — APPOINTMENT (OUTPATIENT)
Dept: GENERAL RADIOLOGY | Facility: HOSPITAL | Age: 60
End: 2025-07-10
Payer: MEDICAID

## 2025-07-10 DIAGNOSIS — E86.0 DEHYDRATION: ICD-10-CM

## 2025-07-10 DIAGNOSIS — I95.9 HYPOTENSION, UNSPECIFIED HYPOTENSION TYPE: Primary | ICD-10-CM

## 2025-07-10 DIAGNOSIS — C81.90 HODGKIN LYMPHOMA, UNSPECIFIED HODGKIN LYMPHOMA TYPE, UNSPECIFIED BODY REGION: ICD-10-CM

## 2025-07-10 LAB
ALBUMIN SERPL-MCNC: 1.7 G/DL (ref 3.5–5.2)
ALBUMIN SERPL-MCNC: 2.2 G/DL (ref 3.5–5.2)
ALBUMIN/GLOB SERPL: 0.4 G/DL
ALBUMIN/GLOB SERPL: 0.6 G/DL
ALP SERPL-CCNC: 122 U/L (ref 39–117)
ALP SERPL-CCNC: 127 U/L (ref 39–117)
ALT SERPL W P-5'-P-CCNC: 7 U/L (ref 1–33)
ALT SERPL W P-5'-P-CCNC: 7 U/L (ref 1–33)
ANION GAP SERPL CALCULATED.3IONS-SCNC: 14.7 MMOL/L (ref 5–15)
ANION GAP SERPL CALCULATED.3IONS-SCNC: 14.9 MMOL/L (ref 5–15)
AST SERPL-CCNC: 11 U/L (ref 1–32)
AST SERPL-CCNC: 7 U/L (ref 1–32)
BASOPHILS # BLD AUTO: 0.01 10*3/MM3 (ref 0–0.2)
BASOPHILS NFR BLD AUTO: 0.1 % (ref 0–1.5)
BILIRUB SERPL-MCNC: 0.2 MG/DL (ref 0–1.2)
BILIRUB SERPL-MCNC: 0.3 MG/DL (ref 0–1.2)
BUN SERPL-MCNC: 13.8 MG/DL (ref 6–20)
BUN SERPL-MCNC: 16.9 MG/DL (ref 6–20)
BUN/CREAT SERPL: 62.6 (ref 7–25)
BUN/CREAT SERPL: 72.6 (ref 7–25)
CALCIUM SPEC-SCNC: 7.6 MG/DL (ref 8.6–10.5)
CALCIUM SPEC-SCNC: 8.7 MG/DL (ref 8.6–10.5)
CHLORIDE SERPL-SCNC: 91 MMOL/L (ref 98–107)
CHLORIDE SERPL-SCNC: 95 MMOL/L (ref 98–107)
CO2 SERPL-SCNC: 19.3 MMOL/L (ref 22–29)
CO2 SERPL-SCNC: 21.1 MMOL/L (ref 22–29)
CREAT SERPL-MCNC: 0.19 MG/DL (ref 0.57–1)
CREAT SERPL-MCNC: 0.27 MG/DL (ref 0.57–1)
D-LACTATE SERPL-SCNC: 1.1 MMOL/L (ref 0.5–2)
DEPRECATED RDW RBC AUTO: 44.7 FL (ref 37–54)
DEPRECATED RDW RBC AUTO: 46.5 FL (ref 37–54)
EGFRCR SERPLBLD CKD-EPI 2021: 125.5 ML/MIN/1.73
EGFRCR SERPLBLD CKD-EPI 2021: 136.6 ML/MIN/1.73
EOSINOPHIL # BLD AUTO: 0 10*3/MM3 (ref 0–0.4)
EOSINOPHIL NFR BLD AUTO: 0 % (ref 0.3–6.2)
ERYTHROCYTE [DISTWIDTH] IN BLOOD BY AUTOMATED COUNT: 15.7 % (ref 12.3–15.4)
ERYTHROCYTE [DISTWIDTH] IN BLOOD BY AUTOMATED COUNT: 16.2 % (ref 12.3–15.4)
GEN 5 1HR TROPONIN T REFLEX: 18 NG/L
GLOBULIN UR ELPH-MCNC: 3.6 GM/DL
GLOBULIN UR ELPH-MCNC: 3.8 GM/DL
GLUCOSE SERPL-MCNC: 161 MG/DL (ref 65–99)
GLUCOSE SERPL-MCNC: 185 MG/DL (ref 65–99)
HCT VFR BLD AUTO: 33.2 % (ref 34–46.6)
HCT VFR BLD AUTO: 35.5 % (ref 34–46.6)
HGB BLD-MCNC: 10.1 G/DL (ref 12–15.9)
HGB BLD-MCNC: 10.8 G/DL (ref 12–15.9)
HOLD SPECIMEN: NORMAL
HOLD SPECIMEN: NORMAL
IMM GRANULOCYTES # BLD AUTO: 0.1 10*3/MM3 (ref 0–0.05)
IMM GRANULOCYTES NFR BLD AUTO: 0.9 % (ref 0–0.5)
LYMPHOCYTES # BLD AUTO: 0.26 10*3/MM3 (ref 0.7–3.1)
LYMPHOCYTES NFR BLD AUTO: 2.3 % (ref 19.6–45.3)
MAGNESIUM SERPL-MCNC: 1.6 MG/DL (ref 1.6–2.6)
MCH RBC QN AUTO: 24.1 PG (ref 26.6–33)
MCH RBC QN AUTO: 24.2 PG (ref 26.6–33)
MCHC RBC AUTO-ENTMCNC: 30.4 G/DL (ref 31.5–35.7)
MCHC RBC AUTO-ENTMCNC: 30.4 G/DL (ref 31.5–35.7)
MCV RBC AUTO: 79.1 FL (ref 79–97)
MCV RBC AUTO: 79.4 FL (ref 79–97)
MONOCYTES # BLD AUTO: 0.37 10*3/MM3 (ref 0.1–0.9)
MONOCYTES NFR BLD AUTO: 3.3 % (ref 5–12)
NEUTROPHILS NFR BLD AUTO: 10.33 10*3/MM3 (ref 1.7–7)
NEUTROPHILS NFR BLD AUTO: 93.4 % (ref 42.7–76)
NRBC BLD AUTO-RTO: 0 /100 WBC (ref 0–0.2)
PLATELET # BLD AUTO: 114 10*3/MM3 (ref 140–450)
PLATELET # BLD AUTO: 124 10*3/MM3 (ref 140–450)
PMV BLD AUTO: 10.8 FL (ref 6–12)
PMV BLD AUTO: 9.8 FL (ref 6–12)
POTASSIUM SERPL-SCNC: 4.6 MMOL/L (ref 3.5–5.2)
POTASSIUM SERPL-SCNC: 5 MMOL/L (ref 3.5–5.2)
PROT SERPL-MCNC: 5.5 G/DL (ref 6–8.5)
PROT SERPL-MCNC: 5.8 G/DL (ref 6–8.5)
QT INTERVAL: 371 MS
QTC INTERVAL: 484 MS
RBC # BLD AUTO: 4.18 10*6/MM3 (ref 3.77–5.28)
RBC # BLD AUTO: 4.49 10*6/MM3 (ref 3.77–5.28)
SODIUM SERPL-SCNC: 127 MMOL/L (ref 136–145)
SODIUM SERPL-SCNC: 129 MMOL/L (ref 136–145)
TROPONIN T % DELTA: -14
TROPONIN T NUMERIC DELTA: -3 NG/L
TROPONIN T SERPL HS-MCNC: 21 NG/L
WBC NRBC COR # BLD AUTO: 10.11 10*3/MM3 (ref 3.4–10.8)
WBC NRBC COR # BLD AUTO: 11.07 10*3/MM3 (ref 3.4–10.8)
WHOLE BLOOD HOLD COAG: NORMAL
WHOLE BLOOD HOLD SPECIMEN: NORMAL

## 2025-07-10 PROCEDURE — 83735 ASSAY OF MAGNESIUM: CPT | Performed by: EMERGENCY MEDICINE

## 2025-07-10 PROCEDURE — G0378 HOSPITAL OBSERVATION PER HR: HCPCS

## 2025-07-10 PROCEDURE — 71045 X-RAY EXAM CHEST 1 VIEW: CPT

## 2025-07-10 PROCEDURE — 80053 COMPREHEN METABOLIC PANEL: CPT | Performed by: INTERNAL MEDICINE

## 2025-07-10 PROCEDURE — 94799 UNLISTED PULMONARY SVC/PX: CPT

## 2025-07-10 PROCEDURE — 96375 TX/PRO/DX INJ NEW DRUG ADDON: CPT

## 2025-07-10 PROCEDURE — P9612 CATHETERIZE FOR URINE SPEC: HCPCS

## 2025-07-10 PROCEDURE — 36415 COLL VENOUS BLD VENIPUNCTURE: CPT | Performed by: EMERGENCY MEDICINE

## 2025-07-10 PROCEDURE — 63710000001 PREDNISONE PER 5 MG: Performed by: INTERNAL MEDICINE

## 2025-07-10 PROCEDURE — 85027 COMPLETE CBC AUTOMATED: CPT | Performed by: INTERNAL MEDICINE

## 2025-07-10 PROCEDURE — 36415 COLL VENOUS BLD VENIPUNCTURE: CPT

## 2025-07-10 PROCEDURE — 80053 COMPREHEN METABOLIC PANEL: CPT | Performed by: EMERGENCY MEDICINE

## 2025-07-10 PROCEDURE — 94760 N-INVAS EAR/PLS OXIMETRY 1: CPT

## 2025-07-10 PROCEDURE — 25810000003 SODIUM CHLORIDE 0.9 % SOLUTION: Performed by: EMERGENCY MEDICINE

## 2025-07-10 PROCEDURE — 85025 COMPLETE CBC W/AUTO DIFF WBC: CPT | Performed by: EMERGENCY MEDICINE

## 2025-07-10 PROCEDURE — 99285 EMERGENCY DEPT VISIT HI MDM: CPT

## 2025-07-10 PROCEDURE — 93005 ELECTROCARDIOGRAM TRACING: CPT | Performed by: EMERGENCY MEDICINE

## 2025-07-10 PROCEDURE — 84484 ASSAY OF TROPONIN QUANT: CPT | Performed by: EMERGENCY MEDICINE

## 2025-07-10 PROCEDURE — 25010000002 ONDANSETRON PER 1 MG: Performed by: EMERGENCY MEDICINE

## 2025-07-10 PROCEDURE — 83605 ASSAY OF LACTIC ACID: CPT | Performed by: EMERGENCY MEDICINE

## 2025-07-10 RX ORDER — ACETAMINOPHEN 500 MG
1000 TABLET ORAL DAILY
Status: DISCONTINUED | OUTPATIENT
Start: 2025-07-11 | End: 2025-07-15 | Stop reason: HOSPADM

## 2025-07-10 RX ORDER — LOPERAMIDE HYDROCHLORIDE 2 MG/1
2 CAPSULE ORAL 4 TIMES DAILY PRN
Status: DISCONTINUED | OUTPATIENT
Start: 2025-07-10 | End: 2025-07-15 | Stop reason: HOSPADM

## 2025-07-10 RX ORDER — BISACODYL 10 MG
10 SUPPOSITORY, RECTAL RECTAL DAILY PRN
Status: DISCONTINUED | OUTPATIENT
Start: 2025-07-10 | End: 2025-07-15 | Stop reason: HOSPADM

## 2025-07-10 RX ORDER — ONDANSETRON 2 MG/ML
4 INJECTION INTRAMUSCULAR; INTRAVENOUS EVERY 6 HOURS PRN
Status: DISCONTINUED | OUTPATIENT
Start: 2025-07-10 | End: 2025-07-15 | Stop reason: HOSPADM

## 2025-07-10 RX ORDER — ALUMINA, MAGNESIA, AND SIMETHICONE 2400; 2400; 240 MG/30ML; MG/30ML; MG/30ML
15 SUSPENSION ORAL EVERY 6 HOURS PRN
Status: DISCONTINUED | OUTPATIENT
Start: 2025-07-10 | End: 2025-07-15 | Stop reason: HOSPADM

## 2025-07-10 RX ORDER — BISACODYL 5 MG/1
5 TABLET, DELAYED RELEASE ORAL DAILY PRN
Status: DISCONTINUED | OUTPATIENT
Start: 2025-07-10 | End: 2025-07-15 | Stop reason: HOSPADM

## 2025-07-10 RX ORDER — NYSTATIN 100000 [USP'U]/ML
5 SUSPENSION ORAL 4 TIMES DAILY
Status: DISCONTINUED | OUTPATIENT
Start: 2025-07-10 | End: 2025-07-15 | Stop reason: HOSPADM

## 2025-07-10 RX ORDER — HYDROCODONE BITARTRATE AND ACETAMINOPHEN 10; 325 MG/1; MG/1
1 TABLET ORAL EVERY 4 HOURS PRN
Status: DISCONTINUED | OUTPATIENT
Start: 2025-07-10 | End: 2025-07-15 | Stop reason: HOSPADM

## 2025-07-10 RX ORDER — PREDNISONE 10 MG/1
15 TABLET ORAL 2 TIMES DAILY
Status: DISCONTINUED | OUTPATIENT
Start: 2025-07-10 | End: 2025-07-15 | Stop reason: HOSPADM

## 2025-07-10 RX ORDER — LEVOFLOXACIN 500 MG/1
500 TABLET, FILM COATED ORAL DAILY
COMMUNITY
Start: 2025-06-30

## 2025-07-10 RX ORDER — ONDANSETRON 4 MG/1
8 TABLET, ORALLY DISINTEGRATING ORAL EVERY 8 HOURS PRN
Status: DISCONTINUED | OUTPATIENT
Start: 2025-07-10 | End: 2025-07-15 | Stop reason: HOSPADM

## 2025-07-10 RX ORDER — SODIUM CHLORIDE 0.9 % (FLUSH) 0.9 %
10 SYRINGE (ML) INJECTION AS NEEDED
Status: DISCONTINUED | OUTPATIENT
Start: 2025-07-10 | End: 2025-07-15 | Stop reason: HOSPADM

## 2025-07-10 RX ORDER — SODIUM CHLORIDE 0.9 % (FLUSH) 0.9 %
10 SYRINGE (ML) INJECTION EVERY 12 HOURS SCHEDULED
Status: DISCONTINUED | OUTPATIENT
Start: 2025-07-10 | End: 2025-07-15 | Stop reason: HOSPADM

## 2025-07-10 RX ORDER — PROMETHAZINE HYDROCHLORIDE 25 MG/1
25 SUPPOSITORY RECTAL EVERY 8 HOURS PRN
Status: DISCONTINUED | OUTPATIENT
Start: 2025-07-10 | End: 2025-07-15 | Stop reason: HOSPADM

## 2025-07-10 RX ORDER — TRIAMCINOLONE ACETONIDE 1 MG/G
1 CREAM TOPICAL 2 TIMES DAILY
Status: DISCONTINUED | OUTPATIENT
Start: 2025-07-10 | End: 2025-07-15 | Stop reason: HOSPADM

## 2025-07-10 RX ORDER — ALPRAZOLAM 0.25 MG
0.25 TABLET ORAL 2 TIMES DAILY
Status: DISCONTINUED | OUTPATIENT
Start: 2025-07-10 | End: 2025-07-15 | Stop reason: HOSPADM

## 2025-07-10 RX ORDER — POLYETHYLENE GLYCOL 3350 17 G/17G
17 POWDER, FOR SOLUTION ORAL DAILY PRN
Status: DISCONTINUED | OUTPATIENT
Start: 2025-07-10 | End: 2025-07-15 | Stop reason: HOSPADM

## 2025-07-10 RX ORDER — METOPROLOL SUCCINATE 25 MG/1
25 TABLET, EXTENDED RELEASE ORAL EVERY 12 HOURS SCHEDULED
Status: DISCONTINUED | OUTPATIENT
Start: 2025-07-10 | End: 2025-07-15 | Stop reason: HOSPADM

## 2025-07-10 RX ORDER — FAMOTIDINE 20 MG/1
40 TABLET, FILM COATED ORAL
Status: DISCONTINUED | OUTPATIENT
Start: 2025-07-11 | End: 2025-07-15 | Stop reason: HOSPADM

## 2025-07-10 RX ORDER — ACETAMINOPHEN 500 MG
1000 TABLET ORAL DAILY
COMMUNITY

## 2025-07-10 RX ORDER — PAROXETINE 20 MG/1
20 TABLET, FILM COATED ORAL EVERY MORNING
Status: DISCONTINUED | OUTPATIENT
Start: 2025-07-11 | End: 2025-07-15 | Stop reason: HOSPADM

## 2025-07-10 RX ORDER — AMOXICILLIN 250 MG
2 CAPSULE ORAL 2 TIMES DAILY
Status: DISCONTINUED | OUTPATIENT
Start: 2025-07-10 | End: 2025-07-15 | Stop reason: HOSPADM

## 2025-07-10 RX ORDER — ONDANSETRON 2 MG/ML
4 INJECTION INTRAMUSCULAR; INTRAVENOUS ONCE
Status: COMPLETED | OUTPATIENT
Start: 2025-07-10 | End: 2025-07-10

## 2025-07-10 RX ORDER — SODIUM CHLORIDE 9 MG/ML
40 INJECTION, SOLUTION INTRAVENOUS AS NEEDED
Status: DISCONTINUED | OUTPATIENT
Start: 2025-07-10 | End: 2025-07-15 | Stop reason: HOSPADM

## 2025-07-10 RX ORDER — BUDESONIDE 0.5 MG/2ML
0.5 INHALANT ORAL
Status: DISCONTINUED | OUTPATIENT
Start: 2025-07-10 | End: 2025-07-15 | Stop reason: HOSPADM

## 2025-07-10 RX ORDER — IPRATROPIUM BROMIDE AND ALBUTEROL SULFATE 2.5; .5 MG/3ML; MG/3ML
3 SOLUTION RESPIRATORY (INHALATION) 4 TIMES DAILY PRN
Status: DISCONTINUED | OUTPATIENT
Start: 2025-07-10 | End: 2025-07-15 | Stop reason: HOSPADM

## 2025-07-10 RX ADMIN — TRIAMCINOLONE ACETONIDE 1 APPLICATION: 1 CREAM TOPICAL at 21:55

## 2025-07-10 RX ADMIN — SODIUM CHLORIDE 2000 ML: 9 INJECTION, SOLUTION INTRAVENOUS at 16:44

## 2025-07-10 RX ADMIN — NYSTATIN 500000 UNITS: 100000 SUSPENSION ORAL at 20:37

## 2025-07-10 RX ADMIN — Medication 10 ML: at 20:37

## 2025-07-10 RX ADMIN — PREDNISONE 15 MG: 10 TABLET ORAL at 20:37

## 2025-07-10 RX ADMIN — ONDANSETRON 4 MG: 2 INJECTION, SOLUTION INTRAMUSCULAR; INTRAVENOUS at 16:51

## 2025-07-10 NOTE — H&P
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 female Chemotherapy-induced nausea and vomiting    Review of Systems   All systems were reviewed and negative except for: Review    Personal History     Past Medical History:   Diagnosis Date    Anesthesia     REPORTS HAS GOT REAL EMOTIONAL AND HAS BECOME ANGRY BEFORE    Anxiety     Aortic stenosis, severe     HAS BIO VALVE REPLACED    Arthritis     Asthma     Back pain     Blood clotting disorder     test to find out what type    Cancer     CHF (congestive heart failure)     Chronic low back pain with sciatica     Chronic pain disorder     COPD (chronic obstructive pulmonary disease)     Coronary artery disease     looking to follow with Cardio    Diabetes mellitus     TYPE 2    Dyspnea on effort     Fatty liver     GERD (gastroesophageal reflux disease)     H/O lumpectomy     H/O: hysterectomy     Hiatal hernia     History of degenerative disc disease     History of kidney stones     History of pulmonary embolus (PE)     History of transfusion     AS A CHILD NO TRANSFUSION REACTION    Hodgkin's lymphoma     23  5 YEARS SINCE LAST CHEMO TX    Hypertension     Immobility     Lupus     Nausea vomiting and diarrhea 2024    Panic disorder     Pelvic pain     Peripheral neuropathy     Personal history of DVT (deep vein thrombosis)     over 20 years    PONV  (postoperative nausea and vomiting)     Presence of intrathecal pump     PTSD (post-traumatic stress disorder)     Sacroiliac joint disease     SI (sacroiliac) joint inflammation     Venous insufficiency        Past Surgical History:   Procedure Laterality Date    ABDOMINAL SURGERY      BACK SURGERY      SPINAL FUSION     BACK SURGERY      BLADDER REPAIR      BRONCHOSCOPY WITH ION ROBOTIC ASSIST N/A 4/22/2025    Procedure: BRONCHOSCOPY WITH ION ROBOT: REBUS, EBUS, NEEDLE ASPIRATES, BIOPSIES, BRUSHINGS, BAL, WASHINGS: insertion of lighted robot navigated instrument to view inside the lung;  Surgeon: Enzo Segal MD;  Location: [facility] MAIN OR;  Service: Robotics - Pulmonary;  Laterality: N/A;    CARDIAC CATHETERIZATION N/A 03/06/2019    Procedure: Valvuloplasty;  Surgeon: Wayne Johnson MD;  Location: [facility] CATH INVASIVE LOCATION;  Service: Cardiology    CARDIAC CATHETERIZATION      CARDIAC CATHETERIZATION Left 05/31/2023    Procedure: Cardiac Catheterization/Vascular Study;  Surgeon: Poncho Reid MD;  Location: [facility] CATH INVASIVE LOCATION;  Service: Cardiovascular;  Laterality: Left;    CARDIAC SURGERY      Mechanical valve    CARDIAC VALVE REPLACEMENT  2021    CHOLECYSTECTOMY      COLONOSCOPY N/A 12/29/2021    Procedure: COLONOSCOPY;  Surgeon: Karine Orlando MD;  Location: [facility] ENDOSCOPY;  Service: Gastroenterology;  Laterality: N/A;  POOR PREP    COLONOSCOPY N/A 04/13/2022    Procedure: COLONOSCOPY WITH POLYPECTOMY;  Surgeon: Karine Orlando MD;  Location: [facility] ENDOSCOPY;  Service: Gastroenterology;  Laterality: N/A;  COLON POLYP, HEMORRHOIDS, POOR PREP    COLONOSCOPY      DIRECT LARYNGOSCOPY, ESOPHAGOSCOPY, BRONCHOSCOPY N/A 05/22/2023    Procedure: DIRECT LARYNGOSCOPY WITH BIOPSIES , ESOPHAGOSCOPY, BRONCHOSCOPY;  Surgeon: Ronnie Mcgarry MD;  Location: [facility] OR [facility];  Service: ENT;  Laterality: N/A;    ENDOSCOPY N/A 12/29/2021    Procedure:  ESOPHAGOGASTRODUODENOSCOPY;  Surgeon: Karine Orlando MD;  Location: [facility] ENDOSCOPY;  Service: Gastroenterology;  Laterality: N/A;  ESOPHAGITIS, GASTRITIS, HIATAL HERNIA    ENDOSCOPY      ENDOSCOPY N/A 04/16/2024    Procedure: ESOPHAGOGASTRODUODENOSCOPY WITH BIOPSIES;  Surgeon: Karine Orlando MD;  Location: [facility] ENDOSCOPY;  Service: Gastroenterology;  Laterality: N/A;  ESOPHAGITIS, HIATAL HERNIA    ENDOSCOPY W/ PEG TUBE PLACEMENT N/A 12/10/2024    Procedure: ESOPHAGOGASTRODUODENOSCOPY WITH PERCUTANEOUS ENDOSCOPIC GASTROSTOMY TUBE INSERTION WITH ANESTHESIA;  Surgeon: Rodger Ortega MD;  Location: [facility] ENDOSCOPY;  Service: Gastroenterology;  Laterality: N/A;  PEG TUBE INSERTION    HYSTERECTOMY      LYMPHADENECTOMY      PAIN PUMP INSERTION/REVISION N/A 10/18/2019    Procedure: PAIN PUMP INSERTION [facility] 10-18-19 [place];  Surgeon: Horace Rios MD;  Location: [facility];  Service: Pain Management    PAIN PUMP INSERTION/REVISION N/A 11/23/2020    Procedure: pain pump removal;  Surgeon: Horace Rios MD;  Location: [facility] OR;  Service: Pain Management;  Laterality: N/A;    PEG TUBE INSERTION N/A 07/26/2023    Procedure: PERCUTANEOUS ENDOSCOPIC GASTROSTOMY TUBE INSERTION;  Surgeon: Kody Mercer MD;  Location: [facility] ENDOSCOPY;  Service: General;  Laterality: N/A;  SUCCESSFUL PEG TUBE PLACE PLACEMENT    PORTACATH PLACEMENT      IN LEFT CHEST REPORTS THAT IT IS NOT FUNCTIONING    SKIN BIOPSY      TONSILLECTOMY      TUBAL ABDOMINAL LIGATION      TUMOR REMOVAL      vaginal /anal area       Family History: family history includes ADD / ADHD in her brother, granddaughter, grandson, and nephew; Anxiety disorder in her mother; Bipolar disorder in her sister; Depression in her sister; Heart failure in her mother; Pancreatic cancer in her paternal grandmother and sister; Prostate cancer in her father; Thyroid cancer in her maternal grandmother. Otherwise pertinent FHx was  reviewed and not pertinent to current issue.    Social History:  reports that she has been smoking cigarettes. She started smoking about 46 years ago. She has a 93 pack-year smoking history. She has been exposed to tobacco smoke. She has never used smokeless tobacco. She reports that she does not drink alcohol and does not use drugs.    Home Medications:  ALPRAZolam, HYDROcodone-acetaminophen, Insulin Lispro (1 Unit Dial), PARoxetine, acetaminophen, brentuximab, budesonide, famotidine, furosemide, ipratropium-albuterol, levoFLOXacin, loperamide, melatonin, metoprolol succinate XL, nystatin, ondansetron ODT, oxyCODONE, pain, predniSONE, and triamcinolone      Allergies:  Allergies   Allergen Reactions    Amoxicillin Shortness Of Breath     Has tolerated Cefazolin, Ceftriaxone, and Cefepime -Jermaine Melida, RPH    Ceclor [Cefaclor] Shortness Of Breath     Has tolerated Cefazolin, Ceftriaxone, and Cefepime -Jermaine Melida, RPH      Penicillins Shortness Of Breath     Has tolerated Cefazolin, Ceftriaxone, and [facility]pime [facility]    Sulfa Antibiotics Rash    Valium [Diazepam] Mental Status Change     DEPRESSED    Ambien [Zolpidem] Mental Status Change    Aspirin GI Intolerance    Ativan [Lorazepam] Mental Status Change    Benadryl [Diphenhydramine] Anxiety     AND MAKES HER HYPER    Biaxin [Clarithromycin] Unknown - Low Severity    Cephalexin Unknown - Low Severity    Clindamycin Unknown - Low Severity    Compazine [Prochlorperazine Edisylate] Rash    Contrast Dye (Echo Or Unknown Ct/Mr) Other (See Comments)     Caused pain in her arm    Doxycycline Rash    Nsaids GI Intolerance    Phenergan [Promethazine Hcl] GI Intolerance    Promethazine GI Intolerance    Vancomycin Itching       Objective   Objective     Vitals:   Temp:  [97.8 °F (36.6 °C)-97.9 °F (36.6 °C)] 97.9 °F (36.6 °C)  Heart Rate:  [] 98  Resp:  [18-20] 18  BP: (79-89)/(61-70) 84/65  Physical Exam    Constitutional: Alert and oriented  not in acute distress   Eyes: Pupils equal reacting to light and accommodation   HENT: Normocephalic   Neck: No lymphadenopathy   Respiratory: No rales or rhonchi   Cardiovascular: S1-S2 normal no S3 or S4   Gastrointestinal: No palpable organomegaly has a G-tube   Musculoskeletal: No deformities except the venous status ulcers with cellulitis   Neurologic: No localizing sign  Skin: No rash    Result Review    Result Review:  I have personally reviewed the results from the time of this admission to 7/10/2025 17:38 EDT and agree with these findings:  [x]  Laboratory  []  Microbiology  []  Radiology  []  EKG/Telemetry   []  Cardiology/Vascular   [x]  Pathology  []  Old records  []  Other:  Most notable findings include: Have been reviewed and discussed    Assessment & Plan   Assessment / Plan     Brief Patient Summary:  Isha Llanes is a 59 y.o. female who  has chemotherapy-induced nausea vomiting    Active Hospital Problems:  Active Hospital Problems    Diagnosis     **Dehydration        Plan:   IV hydration and antiemetics pain management    VTE Prophylaxis:  No VTE prophylaxis order currently exists.        CODE STATUS:         Admission Status:  I believe this patient meets Observation status.    Electronically signed by Teodoro Mancuso MD, 07/10/25, 5:38 PM EDT.      Part of this note may be an electronic transcription/translation of spoken language to printed text using the Dragon Dictation System.

## 2025-07-10 NOTE — ED PROVIDER NOTES
Time: 4:16 PM EDT  Date of encounter:  7/10/2025  Independent Historian/Clinical History and Information was obtained by:   Patient    History is limited by: N/A    Chief Complaint: Generalized weakness, nausea, poor G-tube intake, diarrhea which has been chronic      History of Present Illness:  Patient is a 59 y.o. year old female who presents to the emergency department for evaluation of generalized weakness, nausea, decreased g-tube intake and diarrhea.  This patient presents to the emergency room with the above symptoms.  She was recently started on chemotherapy.  The patient has a history of lymphoma, congestive heart failure, chronic pain, coronary artery disease, COPD, aortic stenosis, lupus, and she has also been diagnosed with anaplastic large cell lymphoma.    The patient was started on chemotherapy yesterday and now is unable to keep any tube feedings and is having diarrhea and severe weakness.  She has had no fever      Patient Care Team  Primary Care Provider: Provider, No Known    Past Medical History:     Allergies   Allergen Reactions    Amoxicillin Shortness Of Breath     Has tolerated Cefazolin, Ceftriaxone, and Cefepime -Jermaine Melida, [facility]    Ceclor [Cefaclor] Shortness Of Breath     Has tolerated Cefazolin, Ceftriaxone, and Cefepime -Jermaine Melida, [facility]      Penicillins Shortness Of Breath     Has tolerated Cefazolin, Ceftriaxone, and Cefepime -Jermaine Melida, [facility]    Sulfa Antibiotics Rash    Valium [Diazepam] Mental Status Change     DEPRESSED    Ambien [Zolpidem] Mental Status Change    Aspirin GI Intolerance    Ativan [Lorazepam] Mental Status Change    Benadryl [Diphenhydramine] Anxiety     AND MAKES HER HYPER    Biaxin [Clarithromycin] Unknown - Low Severity    Cephalexin Unknown - Low Severity    Clindamycin Unknown - Low Severity    Compazine [Prochlorperazine Edisylate] Rash    Contrast Dye (Echo Or Unknown Ct/Mr) Other (See Comments)     Caused pain in her arm    Doxycycline Rash     Nsaids GI Intolerance    Phenergan [Promethazine Hcl] GI Intolerance    Promethazine GI Intolerance    Vancomycin Itching     Past Medical History:   Diagnosis Date    Anesthesia     REPORTS HAS GOT REAL EMOTIONAL AND HAS BECOME ANGRY BEFORE    Anxiety     Aortic stenosis, severe     HAS BIO VALVE REPLACED    Arthritis     Asthma     Back pain     Blood clotting disorder     test to find out what type    Cancer     CHF (congestive heart failure)     Chronic low back pain with sciatica     Chronic pain disorder     COPD (chronic obstructive pulmonary disease)     Coronary artery disease     looking to follow with Cardio    Diabetes mellitus     TYPE 2    Dyspnea on effort     Fatty liver     GERD (gastroesophageal reflux disease)     H/O lumpectomy     H/O: hysterectomy     Hiatal hernia     History of degenerative disc disease     History of kidney stones     History of pulmonary embolus (PE)     History of transfusion     AS A CHILD NO TRANSFUSION REACTION    Hodgkin's lymphoma     5/19/23  5 YEARS SINCE LAST CHEMO TX    Hypertension     Immobility     Lupus     Nausea vomiting and diarrhea 03/07/2024    Panic disorder     Pelvic pain     Peripheral neuropathy     Personal history of DVT (deep vein thrombosis)     over 20 years    PONV (postoperative nausea and vomiting)     Presence of intrathecal pump     PTSD (post-traumatic stress disorder)     Sacroiliac joint disease     SI (sacroiliac) joint inflammation     Venous insufficiency      Past Surgical History:   Procedure Laterality Date    ABDOMINAL SURGERY      BACK SURGERY      SPINAL FUSION     BACK SURGERY      BLADDER REPAIR      BRONCHOSCOPY WITH ION ROBOTIC ASSIST N/A 4/22/2025    Procedure: BRONCHOSCOPY WITH ION ROBOT: REBUS, EBUS, NEEDLE ASPIRATES, BIOPSIES, BRUSHINGS, BAL, WASHINGS: insertion of lighted robot navigated instrument to view inside the lung;  Surgeon: Enzo Segal MD;  Location: [facility];  Service: Robotics - Pulmonary;   Laterality: N/A;    CARDIAC CATHETERIZATION N/A 03/06/2019    Procedure: Valvuloplasty;  Surgeon: Wayne Johnson MD;  Location: [facility] INVASIVE LOCATION;  Service: Cardiology    CARDIAC CATHETERIZATION      CARDIAC CATHETERIZATION Left 05/31/2023    Procedure: Cardiac Catheterization/Vascular Study;  Surgeon: Poncho Reid MD;  Location: [facility] CATH INVASIVE LOCATION;  Service: Cardiovascular;  Laterality: Left;    CARDIAC SURGERY      Mechanical valve    CARDIAC VALVE REPLACEMENT  2021    CHOLECYSTECTOMY      COLONOSCOPY N/A 12/29/2021    Procedure: COLONOSCOPY;  Surgeon: Karine Orlando MD;  Location: [facility] ENDOSCOPY;  Service: Gastroenterology;  Laterality: N/A;  POOR PREP    COLONOSCOPY N/A 04/13/2022    Procedure: COLONOSCOPY WITH POLYPECTOMY;  Surgeon: Karine Orlando MD;  Location: [facility] ENDOSCOPY;  Service: Gastroenterology;  Laterality: N/A;  COLON POLYP, HEMORRHOIDS, POOR PREP    COLONOSCOPY      DIRECT LARYNGOSCOPY, ESOPHAGOSCOPY, BRONCHOSCOPY N/A 05/22/2023    Procedure: DIRECT LARYNGOSCOPY WITH BIOPSIES , ESOPHAGOSCOPY, BRONCHOSCOPY;  Surgeon: Ronnie Mcgarry MD;  Location: [facility] OR [facility];  Service: ENT;  Laterality: N/A;    ENDOSCOPY N/A 12/29/2021    Procedure: ESOPHAGOGASTRODUODENOSCOPY;  Surgeon: Karine Orlando MD;  Location: [facility] ENDOSCOPY;  Service: Gastroenterology;  Laterality: N/A;  ESOPHAGITIS, GASTRITIS, HIATAL HERNIA    ENDOSCOPY      ENDOSCOPY N/A 04/16/2024    Procedure: ESOPHAGOGASTRODUODENOSCOPY WITH BIOPSIES;  Surgeon: Karine Orlando MD;  Location: [facility] ENDOSCOPY;  Service: Gastroenterology;  Laterality: N/A;  ESOPHAGITIS, HIATAL HERNIA    ENDOSCOPY W/ PEG TUBE PLACEMENT N/A 12/10/2024    Procedure: ESOPHAGOGASTRODUODENOSCOPY WITH PERCUTANEOUS ENDOSCOPIC GASTROSTOMY TUBE INSERTION WITH ANESTHESIA;  Surgeon: Rodger Ortega MD;  Location: [facility] ENDOSCOPY;  Service: Gastroenterology;  Laterality: N/A;  PEG TUBE  INSERTION    HYSTERECTOMY      LYMPHADENECTOMY      PAIN PUMP INSERTION/REVISION N/A 10/18/2019    Procedure: PAIN PUMP INSERTION [facility] 10-18-19 [facility];  Surgeon: Horace Rios MD;  Location: [facility] OR;  Service: Pain Management    PAIN PUMP INSERTION/REVISION N/A 11/23/2020    Procedure: pain pump removal;  Surgeon: Horace Rios MD;  Location: [facility] MAIN OR;  Service: Pain Management;  Laterality: N/A;    PEG TUBE INSERTION N/A 07/26/2023    Procedure: PERCUTANEOUS ENDOSCOPIC GASTROSTOMY TUBE INSERTION;  Surgeon: Kody Mercre MD;  Location: [facility] ENDOSCOPY;  Service: General;  Laterality: N/A;  SUCCESSFUL PEG TUBE PLACE PLACEMENT    PORTACATH PLACEMENT      IN LEFT CHEST REPORTS THAT IT IS NOT FUNCTIONING    SKIN BIOPSY      TONSILLECTOMY      TUBAL ABDOMINAL LIGATION      TUMOR REMOVAL      vaginal /anal area     Family History   Problem Relation Age of Onset    Heart failure Mother     Anxiety disorder Mother     Prostate cancer Father     Depression Sister     Bipolar disorder Sister     Pancreatic cancer Sister     ADD / ADHD Brother     Thyroid cancer Maternal Grandmother     Pancreatic cancer Paternal Grandmother     ADD / ADHD Grandson     ADD / ADHD Granddaughter     ADD / ADHD Nephew     Malig Hyperthermia Neg Hx        Home Medications:  Prior to Admission medications    Medication Sig Start Date End Date Taking? Authorizing Provider   ALPRAZolam (XANAX) 0.25 MG tablet Take 1 tablet by mouth 2 (Two) Times a Day. 11/25/24   Nette Jiménez MD   budesonide (PULMICORT) 0.5 MG/2ML nebulizer solution Take 2 mL by nebulization 2 (Two) Times a Day.    Nette Jiménez MD   famotidine (Pepcid) 40 MG tablet Take 1 tablet by mouth every night at bedtime. 3/27/25   Sobeida Espinosa APRN   furosemide (LASIX) 20 MG tablet Take 1 tablet by mouth Daily.    Nette Jiménez MD   HYDROcodone-acetaminophen (NORCO)  MG per tablet Take 1 tablet by mouth Every 4 (Four)  Hours As Needed for Mild Pain. 6/17/25   Nette Jiménez MD   Insulin Lispro, 1 Unit Dial, (HUMALOG) 100 UNIT/ML solution pen-injector Inject 12 Units under the skin into the appropriate area as directed Daily. 2/14/25   Nette Jiménez MD   ipratropium-albuterol (DUO-NEB) 0.5-2.5 mg/3 ml nebulizer Take 3 mL by nebulization 4 (Four) Times a Day As Needed for Wheezing for up to 30 days. 4/9/25 6/24/25  Xochitl Estrada APRN   loperamide (IMODIUM) 2 MG capsule Take 1 capsule by mouth 4 (Four) Times a Day As Needed for Diarrhea (2 with 1st episode of diarrhea and 1 with each additonal episode, max of 8 per day.). 4/21/25   Sobeida Espinosa APRN   melatonin 5 MG tablet tablet Take 1 tablet by mouth Every Night. 6/11/25   Nette Jiménez MD   metoprolol succinate XL (TOPROL-XL) 25 MG 24 hr tablet Take 1 tablet by mouth every night at bedtime. 4/17/25   Fredis Chris MD   nystatin (MYCOSTATIN) 100,000 unit/mL suspension Take 4 mL by mouth 4 (Four) Times a Day. For 14 days 6/11/25   Nette Jiménez MD   ondansetron ODT (ZOFRAN-ODT) 8 MG disintegrating tablet Place 1 tablet on the tongue Every 8 (Eight) Hours As Needed for Nausea or Vomiting. 3/27/25   Nette Jiménez MD   oxyCODONE (ROXICODONE) 10 MG tablet Take 1 tablet by mouth Every 4 (Four) Hours As Needed for Pain. 6/3/25   Nette Jiménez MD   pain patient supplied pump by Intrathecal route Continuous. Type of Medication: Hydromorphone 15 mg/ml and Bupivacaine 0.5 mg/ml  [employer] [phone]  Provider:Dr. Boudreaux  Provider number: [phone]  Basal Dose: Hydromorphone 7.518 mg/day Bupivacaine 0.35256 mg/day  Pump capacity: 40ml  ( MAX of Hydromorphone 9.456 mg/ day; Bupivacaine 0.11413 mg /day)  Bolus Dose:Hydromorphone 0.500 mg, Bupivacaine 0.03336 mg  Max activations: 4 per day  Lockout 3 hours. 1 Bolus per every 3 hours   Pump manufacture:[employer]  Refill pump before 07/06/25    Manny Boudreaux MD   PARoxetine  "(PAXIL) 20 MG tablet Take 1 tablet by mouth Every Morning. 3/27/25   Nette Jiménez MD   predniSONE (DELTASONE) 5 MG tablet Take 3 tablets by mouth 2 (Two) Times a Day. 15mg in am 15mg in pm  Patient taking differently: Take 3 tablets by mouth 2 (Two) Times a Day. 5/5/25   Virgil Navarro MD   triamcinolone (KENALOG) 0.1 % cream Apply 1 Application topically to the appropriate area as directed 2 (Two) Times a Day. For 14 days 6/11/25   Nette Jiménez MD        Social History:   Social History     Tobacco Use    Smoking status: Every Day     Current packs/day: 2.00     Average packs/day: 2.0 packs/day for 46.5 years (93.0 ttl pk-yrs)     Types: Cigarettes     Start date: 1979     Passive exposure: Current    Smokeless tobacco: Never    Tobacco comments:     Currently smoking 1.5 daily. 04--js   Vaping Use    Vaping status: Never Used   Substance Use Topics    Alcohol use: Never    Drug use: Never         Review of Systems:  Review of Systems   Constitutional:  Negative for chills and fever.   HENT:  Negative for congestion, ear pain and sore throat.    Eyes:  Negative for pain.   Respiratory:  Negative for cough, chest tightness and shortness of breath.    Cardiovascular:  Negative for chest pain.   Gastrointestinal:  Positive for diarrhea and nausea. Negative for abdominal pain and vomiting.   Genitourinary:  Negative for flank pain and hematuria.   Musculoskeletal:  Negative for joint swelling.   Skin:  Negative for pallor.   Neurological:  Positive for weakness. Negative for seizures and headaches.   All other systems reviewed and are negative.       Physical Exam:  BP (!) 88/65   Pulse 96   Temp 97.9 °F (36.6 °C) (Oral)   Resp 18   Ht 160 cm (63\")   Wt 48.3 kg (106 lb 7.7 oz)   LMP  (LMP Unknown)   SpO2 90%   BMI 18.86 kg/m²     Physical Exam  Vitals and nursing note reviewed.   Constitutional:       General: She is in acute distress.      Appearance: Normal appearance. She is " ill-appearing. She is not toxic-appearing.   HENT:      Head: Normocephalic and atraumatic.      Right Ear: External ear normal.      Left Ear: External ear normal.      Nose: Nose normal.      Mouth/Throat:      Mouth: Mucous membranes are dry.   Eyes:      General: No scleral icterus.     Pupils: Pupils are equal, round, and reactive to light.   Cardiovascular:      Rate and Rhythm: Normal rate and regular rhythm.      Pulses: Normal pulses.      Heart sounds: Normal heart sounds.   Pulmonary:      Effort: Pulmonary effort is normal. No respiratory distress.      Breath sounds: Normal breath sounds.   Abdominal:      General: Abdomen is flat.      Palpations: Abdomen is soft.      Tenderness: There is no abdominal tenderness.      Comments: G-tube in place   Musculoskeletal:         General: Normal range of motion.      Cervical back: Normal range of motion and neck supple.   Skin:     General: Skin is warm and dry.      Capillary Refill: Capillary refill takes less than 2 seconds.   Neurological:      General: No focal deficit present.      Mental Status: She is alert and oriented to person, place, and time. Mental status is at baseline.                    Medical Decision Making:      Comorbidities that affect care:    Cancer    External Notes reviewed:    Previous Clinic Note: Office visit with oncology yesterday for chemotherapy      The following orders were placed and all results were independently analyzed by me:  Orders Placed This Encounter   Procedures    Clostridioides difficile Toxin - Stool, Per Rectum    Clostridioides difficile Toxin, PCR - Stool, Per Rectum    XR Chest 1 View    [place] Draw    Comprehensive Metabolic Panel    High Sensitivity Troponin T    Magnesium    CBC Auto Differential    Urinalysis With Culture If Indicated - Straight Cath    Lactic Acid, Plasma    High Sensitivity Troponin T 1Hr    Magnesium    Comprehensive Metabolic Panel    NPO Diet NPO Type: Strict NPO    Undress & Gown     Continuous Pulse Oximetry    Vital Signs    Orthostatic Blood Pressure    IP General Consult (Use specialty-specific consult if known)    Patient Isolation Contact Spore    Oxygen Therapy- Nasal Cannula; Titrate 1-6 LPM Per SpO2; 90 - 95%    POC Glucose Once    ECG 12 Lead Other; weakness    Insert Peripheral IV    Initiate Observation Status    Fall Precautions    CBC & Differential    Green Top (Gel)    Lavender Top    Gold Top - SST    Light Blue Top    CBC & Differential       Medications Given in the Emergency Department:  Medications   sodium chloride 0.9 % flush 10 mL (has no administration in time range)   sodium chloride 0.9 % bolus 2,000 mL (0 mL Intravenous Stopped 7/10/25 1714)   ondansetron (ZOFRAN) injection 4 mg (4 mg Intravenous Given 7/10/25 1651)        ED Course:           EKG: Sinus tachycardia 3 to 102 bpm  Left atrial enlargement  Nonspecific ST change  Labs:    Lab Results (last 24 hours)       Procedure Component Value Units Date/Time    CBC & Differential [[id number]]  (Abnormal) Collected: 07/10/25 1611    Specimen: Blood from Arm, Right Updated: 07/10/25 1631    Narrative:      The following orders were created for panel order CBC & Differential.  Procedure                               Abnormality         Status                     ---------                               -----------         ------                     CBC Auto Differential[[id number]]        Abnormal            Final result               Scan Slide[[id number]]                                                                    Please view results for these tests on the individual orders.    Comprehensive Metabolic Panel [[id number]]  (Abnormal) Collected: 07/10/25 1611    Specimen: Blood Updated: 07/10/25 1641     Glucose 185 mg/dL      BUN 16.9 mg/dL      Creatinine 0.27 mg/dL      Sodium 127 mmol/L      Potassium 5.0 mmol/L      Chloride 91 mmol/L      CO2 21.1 mmol/L      Calcium 8.7 mg/dL      Total Protein 5.8 g/dL       Albumin 2.2 g/dL      ALT (SGPT) 7 U/L      AST (SGOT) 7 U/L      Alkaline Phosphatase 122 U/L      Total Bilirubin 0.3 mg/dL      Globulin 3.6 gm/dL      A/G Ratio 0.6 g/dL      BUN/Creatinine Ratio 62.6     Anion Gap 14.9 mmol/L      eGFR 125.5 mL/min/1.73     Narrative:      GFR Categories in Chronic Kidney Disease (CKD)              GFR Category          GFR (mL/min/1.73)    Interpretation  G1                    90 or greater        Normal or high (1)  G2                    60-89                Mild decrease (1)  G3a                   45-59                Mild to moderate decrease  G3b                   30-44                Moderate to severe decrease  G4                    15-29                Severe decrease  G5                    14 or less           Kidney failure    (1)In the absence of evidence of kidney disease, neither GFR category G1 or G2 fulfill the criteria for CKD.    eGFR calculation 2021 CKD-EPI creatinine equation, which does not include race as a factor    High Sensitivity Troponin T [[id number]]  (Abnormal) Collected: 07/10/25 1611    Specimen: Blood Updated: 07/10/25 1641     HS Troponin T 21 ng/L     Narrative:      High Sensitive Troponin T Reference Range:  <14.0 ng/L- Negative Female for AMI  <22.0 ng/L- Negative Male for AMI  >=14 - Abnormal Female indicating possible myocardial injury.  >=22 - Abnormal Male indicating possible myocardial injury.   Clinicians would have to utilize clinical acumen, EKG, Troponin, and serial changes to determine if it is an Acute Myocardial Infarction or myocardial injury due to an underlying chronic condition.         Magnesium [[id number]]  (Normal) Collected: 07/10/25 1611    Specimen: Blood Updated: 07/10/25 1641     Magnesium 1.6 mg/dL     CBC Auto Differential [[id number]]  (Abnormal) Collected: 07/10/25 1611    Specimen: Blood from Arm, Right Updated: 07/10/25 1631     WBC 11.07 10*3/mm3      RBC 4.49 10*6/mm3      Hemoglobin 10.8 g/dL      Hematocrit  35.5 %      MCV 79.1 fL      MCH 24.1 pg      MCHC 30.4 g/dL      RDW 16.2 %      RDW-SD 46.5 fl      MPV 9.8 fL      Platelets 124 10*3/mm3      Neutrophil % 93.4 %      Lymphocyte % 2.3 %      Monocyte % 3.3 %      Eosinophil % 0.0 %      Basophil % 0.1 %      Immature Grans % 0.9 %      Neutrophils, Absolute 10.33 10*3/mm3      Lymphocytes, Absolute 0.26 10*3/mm3      Monocytes, Absolute 0.37 10*3/mm3      Eosinophils, Absolute 0.00 10*3/mm3      Basophils, Absolute 0.01 10*3/mm3      Immature Grans, Absolute 0.10 10*3/mm3      nRBC 0.0 /100 WBC     Narrative:      Instrument flags present, additional testing may be recommended.    High Sensitivity Troponin T 1Hr [[id number]] Updated: 07/10/25 1747    Specimen: Blood from Arm, Left     Lactic Acid, Plasma [[id number]]  (Normal) Collected: 07/10/25 1718    Specimen: Blood from Arm, Left Updated: 07/10/25 1742     Lactate 1.1 mmol/L              Imaging:    XR Chest 1 View  Result Date: 7/10/2025  XR CHEST 1 VW Date of Exam: 7/10/2025 2:40 PM EDT Indication: Weak/Dizzy/AMS triage protocol Comparison: 7/2/2025 Findings: Cardiomediastinal silhouette is unremarkable. Left IJ port is similar. Similar interstitial prominence and right lower lung calcifications. No airspace disease, pneumothorax, nor pleural effusion. No acute osseous abnormality identified.     Impression: No acute process identified. Electronically Signed: Werner Linares MD  7/10/2025 3:13 PM EDT  Workstation ID: [id number]        Differential Diagnosis and Discussion:    Weakness: Based on the patient's history, signs, and symptoms, the diffential diagnosis includes but is not limited to meningitis, stroke, sepsis, subarachnoid hemorrhage, intracranial bleeding, encephalitis, acute uti, dehydration, MS, myasthenia gravis, [place], migraine variant, neuromuscular disorders vertigo, electrolyte imbalance, and metabolic disorders.    PROCEDURES:    Labs were collected in the emergency department and all  labs were reviewed and interpreted by me.  X-ray were performed in the emergency department and all X-ray impressions were independently interpreted by me.  An EKG was performed and the EKG was interpreted by me.    ECG 12 Lead Other; weakness   Preliminary Result   HEART SIFV=997  bpm   RR Inhiioum=641  ms   NC Vmvgkkuy=713  ms   P Horizontal Axis=-16  deg   P Front Axis=72  deg   QRSD Gfsxgfmc=076  ms   QT Wxpdprbi=521  ms   HCbL=197  ms   QRS Axis=47  deg   T Wave Axis=135  deg   - ABNORMAL ECG -   Sinus tachycardia   Left atrial enlargement   Left bundle branch block   ST elevation secondary to IVCD   When compared with ECG of 02-Jul-2025 12:04:33,   No significant change   Date and Time of Study:2025-07-10 15:20:21          Procedures    MDM     Amount and/or Complexity of Data Reviewed  Clinical lab tests: reviewed  Tests in the radiology section of CPT®: reviewed  Tests in the medicine section of CPT®: reviewed             Total Critical Care time of 45 minutes. Total critical care time documented does not include time spent on separately billed procedures for services of nurses or physician assistants. I personally saw and examined the patient. I have reviewed all diagnostic interpretations and treatment plans as written. I was present for the key portions of any procedures performed and the inclusive time noted in any critical care statement. Critical care time includes patient management by me, time spent at the patients bedside,  time to review lab and imaging results, discussing patient care, documentation in the medical record, and time spent with family or caregiver.          Patient Care Considerations:    SEPSIS was considered but is NOT present in the emergency department as SIRS criteria is not present.      Consultants/Shared Management Plan:    Hospitalist: I have discussed the case with Dr Mancuso who agrees to accept the patient for admission.    Social Determinants of Health:    Patient is  unable to carry out activities of daily life. Escalation of care is necessary.       Disposition and Care Coordination:    Admit:   Through independent evaluation of the patient's history, physical, and imperical data, the patient meets criteria for inpatient admission to the hospital.        Final diagnoses:   Hypotension, unspecified hypotension type   Dehydration        ED Disposition       ED Disposition   Decision to Admit    Condition   --    Comment   Level of Care: Telemetry [5]   Diagnosis: Dehydration [276.51.ICD-9-CM]   Admitting Physician: PEDRO MISHRA [[id number]]   Attending Physician: PEDRO MISHRA [[id number]]                 This medical record created using voice recognition software.             Antonio Hartley,   07/10/25 1817       Antonio Hartley,   07/10/25 1822

## 2025-07-11 ENCOUNTER — APPOINTMENT (OUTPATIENT)
Dept: CT IMAGING | Facility: HOSPITAL | Age: 60
End: 2025-07-11
Payer: MEDICAID

## 2025-07-11 LAB
027 TOXIN: NORMAL
ALBUMIN SERPL-MCNC: 2 G/DL (ref 3.5–5.2)
ALBUMIN/GLOB SERPL: 0.6 G/DL
ALP SERPL-CCNC: 120 U/L (ref 39–117)
ALT SERPL W P-5'-P-CCNC: 7 U/L (ref 1–33)
ANION GAP SERPL CALCULATED.3IONS-SCNC: 16.9 MMOL/L (ref 5–15)
AST SERPL-CCNC: 8 U/L (ref 1–32)
BASOPHILS # BLD AUTO: 0.01 10*3/MM3 (ref 0–0.2)
BASOPHILS NFR BLD AUTO: 0.1 % (ref 0–1.5)
BILIRUB SERPL-MCNC: 0.2 MG/DL (ref 0–1.2)
BUN SERPL-MCNC: 10.1 MG/DL (ref 6–20)
BUN/CREAT SERPL: 48.1 (ref 7–25)
C DIFF TOX GENS STL QL NAA+PROBE: NEGATIVE
CALCIUM SPEC-SCNC: 8.1 MG/DL (ref 8.6–10.5)
CHLORIDE SERPL-SCNC: 94 MMOL/L (ref 98–107)
CO2 SERPL-SCNC: 20.1 MMOL/L (ref 22–29)
CREAT SERPL-MCNC: 0.21 MG/DL (ref 0.57–1)
DEPRECATED RDW RBC AUTO: 46.5 FL (ref 37–54)
EGFRCR SERPLBLD CKD-EPI 2021: 133.4 ML/MIN/1.73
EOSINOPHIL # BLD AUTO: 0 10*3/MM3 (ref 0–0.4)
EOSINOPHIL NFR BLD AUTO: 0 % (ref 0.3–6.2)
ERYTHROCYTE [DISTWIDTH] IN BLOOD BY AUTOMATED COUNT: 16.1 % (ref 12.3–15.4)
GLOBULIN UR ELPH-MCNC: 3.6 GM/DL
GLUCOSE SERPL-MCNC: 164 MG/DL (ref 65–99)
HCT VFR BLD AUTO: 34.5 % (ref 34–46.6)
HGB BLD-MCNC: 10.5 G/DL (ref 12–15.9)
IMM GRANULOCYTES # BLD AUTO: 0.13 10*3/MM3 (ref 0–0.05)
IMM GRANULOCYTES NFR BLD AUTO: 1.2 % (ref 0–0.5)
INR PPP: 1.16 (ref 0.86–1.15)
LYMPHOCYTES # BLD AUTO: 0.28 10*3/MM3 (ref 0.7–3.1)
LYMPHOCYTES NFR BLD AUTO: 2.6 % (ref 19.6–45.3)
MAGNESIUM SERPL-MCNC: 1.5 MG/DL (ref 1.6–2.6)
MCH RBC QN AUTO: 24 PG (ref 26.6–33)
MCHC RBC AUTO-ENTMCNC: 30.4 G/DL (ref 31.5–35.7)
MCV RBC AUTO: 78.8 FL (ref 79–97)
MONOCYTES # BLD AUTO: 0.39 10*3/MM3 (ref 0.1–0.9)
MONOCYTES NFR BLD AUTO: 3.6 % (ref 5–12)
NEUTROPHILS NFR BLD AUTO: 10.12 10*3/MM3 (ref 1.7–7)
NEUTROPHILS NFR BLD AUTO: 92.5 % (ref 42.7–76)
NRBC BLD AUTO-RTO: 0 /100 WBC (ref 0–0.2)
PLATELET # BLD AUTO: 149 10*3/MM3 (ref 140–450)
PMV BLD AUTO: 11 FL (ref 6–12)
POTASSIUM SERPL-SCNC: 4.3 MMOL/L (ref 3.5–5.2)
PROT SERPL-MCNC: 5.6 G/DL (ref 6–8.5)
PROTHROMBIN TIME: 15.3 SECONDS (ref 11.8–14.9)
RBC # BLD AUTO: 4.38 10*6/MM3 (ref 3.77–5.28)
SODIUM SERPL-SCNC: 131 MMOL/L (ref 136–145)
WBC NRBC COR # BLD AUTO: 10.93 10*3/MM3 (ref 3.4–10.8)

## 2025-07-11 PROCEDURE — 94640 AIRWAY INHALATION TREATMENT: CPT

## 2025-07-11 PROCEDURE — G0378 HOSPITAL OBSERVATION PER HR: HCPCS

## 2025-07-11 PROCEDURE — 83735 ASSAY OF MAGNESIUM: CPT | Performed by: INTERNAL MEDICINE

## 2025-07-11 PROCEDURE — 99214 OFFICE O/P EST MOD 30 MIN: CPT | Performed by: INTERNAL MEDICINE

## 2025-07-11 PROCEDURE — 96366 THER/PROPH/DIAG IV INF ADDON: CPT

## 2025-07-11 PROCEDURE — 74176 CT ABD & PELVIS W/O CONTRAST: CPT

## 2025-07-11 PROCEDURE — 85610 PROTHROMBIN TIME: CPT | Performed by: INTERNAL MEDICINE

## 2025-07-11 PROCEDURE — 80053 COMPREHEN METABOLIC PANEL: CPT | Performed by: INTERNAL MEDICINE

## 2025-07-11 PROCEDURE — 85025 COMPLETE CBC W/AUTO DIFF WBC: CPT | Performed by: INTERNAL MEDICINE

## 2025-07-11 PROCEDURE — 87070 CULTURE OTHR SPECIMN AEROBIC: CPT | Performed by: INTERNAL MEDICINE

## 2025-07-11 PROCEDURE — 25010000002 MAGNESIUM SULFATE 4 GM/100ML SOLUTION: Performed by: INTERNAL MEDICINE

## 2025-07-11 PROCEDURE — 94761 N-INVAS EAR/PLS OXIMETRY MLT: CPT

## 2025-07-11 PROCEDURE — 25010000002 CEFTRIAXONE PER 250 MG: Performed by: INTERNAL MEDICINE

## 2025-07-11 PROCEDURE — 87205 SMEAR GRAM STAIN: CPT | Performed by: INTERNAL MEDICINE

## 2025-07-11 PROCEDURE — 96365 THER/PROPH/DIAG IV INF INIT: CPT

## 2025-07-11 PROCEDURE — 87493 C DIFF AMPLIFIED PROBE: CPT | Performed by: EMERGENCY MEDICINE

## 2025-07-11 PROCEDURE — 94799 UNLISTED PULMONARY SVC/PX: CPT

## 2025-07-11 PROCEDURE — 63710000001 PREDNISONE PER 5 MG: Performed by: INTERNAL MEDICINE

## 2025-07-11 RX ORDER — MAGNESIUM SULFATE HEPTAHYDRATE 40 MG/ML
4 INJECTION, SOLUTION INTRAVENOUS ONCE
Status: COMPLETED | OUTPATIENT
Start: 2025-07-11 | End: 2025-07-11

## 2025-07-11 RX ORDER — NYSTATIN 100000 U/G
1 OINTMENT TOPICAL
Status: DISCONTINUED | OUTPATIENT
Start: 2025-07-11 | End: 2025-07-15 | Stop reason: HOSPADM

## 2025-07-11 RX ADMIN — NYSTATIN 500000 UNITS: 100000 SUSPENSION ORAL at 11:46

## 2025-07-11 RX ADMIN — NYSTATIN 1 APPLICATION: 100000 OINTMENT TOPICAL at 17:27

## 2025-07-11 RX ADMIN — ACETAMINOPHEN 1000 MG: 500 TABLET ORAL at 10:51

## 2025-07-11 RX ADMIN — NYSTATIN 500000 UNITS: 100000 SUSPENSION ORAL at 21:22

## 2025-07-11 RX ADMIN — Medication 10 ML: at 10:51

## 2025-07-11 RX ADMIN — HYDROCODONE BITARTRATE AND ACETAMINOPHEN 1 TABLET: 10; 325 TABLET ORAL at 21:16

## 2025-07-11 RX ADMIN — FAMOTIDINE 40 MG: 20 TABLET, FILM COATED ORAL at 06:31

## 2025-07-11 RX ADMIN — DOCUSATE SODIUM 50 MG AND SENNOSIDES 8.6 MG 2 TABLET: 8.6; 5 TABLET, FILM COATED ORAL at 10:53

## 2025-07-11 RX ADMIN — Medication 10 ML: at 21:22

## 2025-07-11 RX ADMIN — NYSTATIN 1 APPLICATION: 100000 OINTMENT TOPICAL at 21:23

## 2025-07-11 RX ADMIN — NYSTATIN 500000 UNITS: 100000 SUSPENSION ORAL at 10:54

## 2025-07-11 RX ADMIN — PREDNISONE 15 MG: 10 TABLET ORAL at 10:53

## 2025-07-11 RX ADMIN — ALPRAZOLAM 0.25 MG: 0.25 TABLET ORAL at 21:16

## 2025-07-11 RX ADMIN — PREDNISONE 15 MG: 10 TABLET ORAL at 21:21

## 2025-07-11 RX ADMIN — CEFTRIAXONE SODIUM 1000 MG: 1 INJECTION, POWDER, FOR SOLUTION INTRAMUSCULAR; INTRAVENOUS at 11:46

## 2025-07-11 RX ADMIN — HYDROCODONE BITARTRATE AND ACETAMINOPHEN 1 TABLET: 10; 325 TABLET ORAL at 03:50

## 2025-07-11 RX ADMIN — TRIAMCINOLONE ACETONIDE 1 APPLICATION: 1 CREAM TOPICAL at 23:25

## 2025-07-11 RX ADMIN — PAROXETINE 20 MG: 20 TABLET, FILM COATED ORAL at 06:31

## 2025-07-11 RX ADMIN — MAGNESIUM SULFATE HEPTAHYDRATE 4 G: 40 INJECTION, SOLUTION INTRAVENOUS at 11:46

## 2025-07-11 RX ADMIN — TRIAMCINOLONE ACETONIDE 1 APPLICATION: 1 CREAM TOPICAL at 11:47

## 2025-07-11 RX ADMIN — ALPRAZOLAM 0.25 MG: 0.25 TABLET ORAL at 11:45

## 2025-07-11 RX ADMIN — Medication 5 MG: at 21:22

## 2025-07-11 RX ADMIN — BUDESONIDE 0.5 MG: 0.5 SUSPENSION RESPIRATORY (INHALATION) at 10:44

## 2025-07-11 RX ADMIN — METOPROLOL SUCCINATE 25 MG: 25 TABLET, EXTENDED RELEASE ORAL at 21:21

## 2025-07-11 NOTE — SIGNIFICANT NOTE
Wound Eval / Progress Noted    JEMMA Go     Patient Name: Isha Llanes  : 1965  MRN: [id number]  Today's Date: 2025                 Admit Date: 7/10/2025    Visit Dx:    ICD-10-CM ICD-9-CM   1. Hypotension, unspecified hypotension type  I95.9 458.9   2. Dehydration  E86.0 276.51         Dehydration        Past Medical History:   Diagnosis Date    Anesthesia     REPORTS HAS GOT REAL EMOTIONAL AND HAS BECOME ANGRY BEFORE    Anxiety     Aortic stenosis, severe     HAS BIO VALVE REPLACED    Arthritis     Asthma     Back pain     Blood clotting disorder     test to find out what type    Cancer     CHF (congestive heart failure)     Chronic low back pain with sciatica     Chronic pain disorder     COPD (chronic obstructive pulmonary disease)     Coronary artery disease     looking to follow with Cardio    Diabetes mellitus     TYPE 2    Dyspnea on effort     Fatty liver     GERD (gastroesophageal reflux disease)     H/O lumpectomy     H/O: hysterectomy     Hiatal hernia     History of degenerative disc disease     History of kidney stones     History of pulmonary embolus (PE)     History of transfusion     AS A CHILD NO TRANSFUSION REACTION    Hodgkin's lymphoma     23  5 YEARS SINCE LAST CHEMO TX    Hypertension     Immobility     Lupus     Nausea vomiting and diarrhea 2024    Panic disorder     Pelvic pain     Peripheral neuropathy     Personal history of DVT (deep vein thrombosis)     over 20 years    PONV (postoperative nausea and vomiting)     Presence of intrathecal pump     PTSD (post-traumatic stress disorder)     Sacroiliac joint disease     SI (sacroiliac) joint inflammation     Venous insufficiency         Past Surgical History:   Procedure Laterality Date    ABDOMINAL SURGERY      BACK SURGERY      SPINAL FUSION     BACK SURGERY      BLADDER REPAIR      BRONCHOSCOPY WITH ION ROBOTIC ASSIST N/A 2025    Procedure: BRONCHOSCOPY WITH ION ROBOT: REBUS, EBUS, NEEDLE ASPIRATES,  BIOPSIES, BRUSHINGS, BAL, WASHINGS: insertion of lighted robot navigated instrument to view inside the lung;  Surgeon: Enzo Segal MD;  Location: [facility] MAIN OR;  Service: Robotics - Pulmonary;  Laterality: N/A;    CARDIAC CATHETERIZATION N/A 03/06/2019    Procedure: Valvuloplasty;  Surgeon: Wayne Johnson MD;  Location: [facility] CATH INVASIVE LOCATION;  Service: Cardiology    CARDIAC CATHETERIZATION      CARDIAC CATHETERIZATION Left 05/31/2023    Procedure: Cardiac Catheterization/Vascular Study;  Surgeon: Poncho Reid MD;  Location: [facility] CATH INVASIVE LOCATION;  Service: Cardiovascular;  Laterality: Left;    CARDIAC SURGERY      Mechanical valve    CARDIAC VALVE REPLACEMENT  2021    CHOLECYSTECTOMY      COLONOSCOPY N/A 12/29/2021    Procedure: COLONOSCOPY;  Surgeon: Karine Orlando MD;  Location: [facility] ENDOSCOPY;  Service: Gastroenterology;  Laterality: N/A;  POOR PREP    COLONOSCOPY N/A 04/13/2022    Procedure: COLONOSCOPY WITH POLYPECTOMY;  Surgeon: Karine Orlando MD;  Location: [facility] ENDOSCOPY;  Service: Gastroenterology;  Laterality: N/A;  COLON POLYP, HEMORRHOIDS, POOR PREP    COLONOSCOPY      DIRECT LARYNGOSCOPY, ESOPHAGOSCOPY, BRONCHOSCOPY N/A 05/22/2023    Procedure: DIRECT LARYNGOSCOPY WITH BIOPSIES , ESOPHAGOSCOPY, BRONCHOSCOPY;  Surgeon: Ronnie Mcgarry MD;  Location: [facility] OR OSC;  Service: ENT;  Laterality: N/A;    ENDOSCOPY N/A 12/29/2021    Procedure: ESOPHAGOGASTRODUODENOSCOPY;  Surgeon: Karine Orlando MD;  Location: [facility] ENDOSCOPY;  Service: Gastroenterology;  Laterality: N/A;  ESOPHAGITIS, GASTRITIS, HIATAL HERNIA    ENDOSCOPY      ENDOSCOPY N/A 04/16/2024    Procedure: ESOPHAGOGASTRODUODENOSCOPY WITH BIOPSIES;  Surgeon: Karine Orlando MD;  Location: [facility] ENDOSCOPY;  Service: Gastroenterology;  Laterality: N/A;  ESOPHAGITIS, HIATAL HERNIA    ENDOSCOPY W/ PEG TUBE PLACEMENT N/A 12/10/2024    Procedure: ESOPHAGOGASTRODUODENOSCOPY  WITH PERCUTANEOUS ENDOSCOPIC GASTROSTOMY TUBE INSERTION WITH ANESTHESIA;  Surgeon: Rodger Ortega MD;  Location: [facility] ENDOSCOPY;  Service: Gastroenterology;  Laterality: N/A;  PEG TUBE INSERTION    HYSTERECTOMY      LYMPHADENECTOMY      PAIN PUMP INSERTION/REVISION N/A 10/18/2019    Procedure: PAIN PUMP INSERTION [facility];  Surgeon: Horace Rios MD;  Location: [facility];  Service: Pain Management    PAIN PUMP INSERTION/REVISION N/A 11/23/2020    Procedure: pain pump removal;  Surgeon: Horace Rios MD;  Location: [facility];  Service: Pain Management;  Laterality: N/A;    PEG TUBE INSERTION N/A 07/26/2023    Procedure: PERCUTANEOUS ENDOSCOPIC GASTROSTOMY TUBE INSERTION;  Surgeon: Kody Mercer MD;  Location: [facility] ENDOSCOPY;  Service: General;  Laterality: N/A;  SUCCESSFUL PEG TUBE PLACE PLACEMENT    PORTACATH PLACEMENT      IN LEFT CHEST REPORTS THAT IT IS NOT FUNCTIONING    SKIN BIOPSY      TONSILLECTOMY      TUBAL ABDOMINAL LIGATION      TUMOR REMOVAL      vaginal /anal area     Physical Assessment:  Wound 01/02/25 1154 Right lateral leg (Active)   Wound Image    07/11/25 0241   Dressing Appearance open to air 07/11/25 0858   Confirmed Empty Wound Bed Yes, visual inspection of wound bed 07/11/25 0858   Closure None 07/11/25 0858   Base moist;red;slough;yellow 07/11/25 0858   Periwound dry;intact 07/11/25 0858   Periwound Temperature warm 07/11/25 0858   Periwound Skin Turgor soft 07/11/25 0858   Edges open 07/11/25 0858   Drainage Amount none 07/11/25 0858   Care, Wound cleansed with;sterile normal saline 07/11/25 0858   Dressing Care dressing applied;dressing moistened;hydrofiber;silver impregnated;abdominal pad;gauze, dry 07/11/25 0858   Periwound Care absorptive dressing applied 07/11/25 0858       Wound 01/02/25 1158 Left distal calf (Active)   Wound Image   07/11/25 0244   Dressing Appearance open to air 07/11/25 0858   Confirmed Empty Wound Bed Yes, visual  inspection of wound bed 07/11/25 0858   Closure None 07/11/25 0858   Base dry;red;slough;yellow 07/11/25 0858   Periwound dry;intact 07/11/25 0858   Periwound Temperature warm 07/11/25 0858   Periwound Skin Turgor soft 07/11/25 0858   Edges rolled/closed 07/11/25 0858   Drainage Amount none 07/11/25 0858   Care, Wound cleansed with;sterile normal saline 07/11/25 0858   Dressing Care dressing applied;dressing moistened;hydrofiber;silver impregnated;abdominal pad;gauze, dry 07/11/25 0858   Periwound Care absorptive dressing applied 07/11/25 0858       Wound 05/04/25 1206 Right distal calf (Active)   Wound Image   07/11/25 0245   Dressing Appearance open to air 07/11/25 0858   Confirmed Empty Wound Bed Yes, visual inspection of wound bed 07/11/25 0858   Closure None 07/11/25 0858   Base dry;red;slough;yellow 07/11/25 0858   Periwound dry;intact 07/11/25 0858   Periwound Temperature warm 07/11/25 0858   Periwound Skin Turgor soft 07/11/25 0858   Edges open 07/11/25 0858   Drainage Amount none 07/11/25 0858   Care, Wound cleansed with;sterile normal saline 07/11/25 0858   Dressing Care dressing applied;dressing moistened;hydrofiber;silver impregnated;abdominal pad;gauze, dry 07/11/25 0858   Periwound Care absorptive dressing applied 07/11/25 0858       Wound 06/10/25 1357 Right anterior fifth toe Vascular Ulcer (Active)   Wound Image   07/11/25 0243   Dressing Appearance open to air 07/11/25 0858   Confirmed Empty Wound Bed Yes, visual inspection of wound bed 07/11/25 0858   Closure None 07/11/25 0858   Base dry;red;slough;yellow 07/11/25 0858   Periwound dry;intact 07/11/25 0858   Periwound Temperature warm 07/11/25 0858   Periwound Skin Turgor soft 07/11/25 0858   Edges open 07/11/25 0858   Drainage Amount none 07/11/25 0858   Care, Wound cleansed with;sterile normal saline 07/11/25 0858   Dressing Care dressing applied;dressing moistened;hydrofiber;silver impregnated;gauze, dry 07/11/25 0858   Periwound Care  absorptive dressing applied 07/11/25 0858       Wound 07/10/25 2100 Bilateral lower coccyx (Active)   Wound Image   07/11/25 0246   Dressing Appearance open to air 07/11/25 0858   Closure None 07/11/25 0858   Base dry;red 07/11/25 0858   Periwound dry;intact;redness;yeast 07/11/25 0858   Periwound Temperature warm 07/11/25 0858   Periwound Skin Turgor soft 07/11/25 0858   Edges rolled/closed 07/11/25 0858   Drainage Amount none 07/11/25 0858   Care, Wound cleansed with;soap and water 07/11/25 0858   Dressing Care open to air 07/11/25 0858       Wound 07/10/25 2100 Left upper abdomen Infection (Active)   Wound Image   07/11/25 0249   Dressing Appearance open to air;moist drainage;dried drainage 07/11/25 0858   Confirmed Empty Wound Bed Yes, visual inspection of wound bed 07/11/25 0858   Closure None 07/11/25 0858   Base moist;red 07/11/25 0858   Periwound intact 07/11/25 0858   Periwound Temperature warm 07/11/25 0858   Periwound Skin Turgor soft 07/11/25 0858   Edges open 07/11/25 0858   Drainage Characteristics/Odor serosanguineous;yellow 07/11/25 0858   Drainage Amount small 07/11/25 0858   Care, Wound cleansed with;sterile normal saline 07/11/25 0858   Dressing Care open to air 07/11/25 0858       Wound 07/11/25 0858 Right posterior heel Pressure Injury (Active)   Wound Image   07/11/25 0858   Pressure Injury Stage DTPI 07/11/25 0858   Dressing Appearance open to air 07/11/25 0858   Closure None 07/11/25 0858   Base maroon/purple;dry 07/11/25 0858   Periwound blistered;intact 07/11/25 0858   Periwound Temperature warm 07/11/25 0858   Periwound Skin Turgor soft 07/11/25 0858   Edges rolled/closed 07/11/25 0858   Drainage Amount none 07/11/25 0858   Care, Wound cleansed with;sterile normal saline 07/11/25 0858   Dressing Care dressing applied;petroleum-based;non-adherent;gauze;gauze, dry 07/11/25 0858   Periwound Care absorptive dressing applied 07/11/25 0858      Wound Check / Follow-up: Patient seen today for  wound consult. Patient is awake and alert at time of visit. She is agreeable to assessment.     PEG tube noted to left abdomen with dried crusted drainage present. Cleansed thoroughly with normal saline and gauze. Erosion with red, moist tissue is noted around PEG tube site. Recommending daily dressing changes with silver impregnated hydrofiber and fenestrated gauze.    Deep tissue injury is present to right heel with intact blood filled blistering noted. Cleansed with normal saline and gauze. Recommending daily dressing changes with non-adherent petroleum based gauze, ABD pad, and gauze roll.    Patient with scattered ulcerations to BLE and right fifth toe. Dry, red tissue and dry yellow sloughing tissue present to wound bases. Cleansed all ulcerations with normal saline and gauze. Recommending daily dressing changes with normal saline moistened silver impregnated hydrofiber, ABD pad, and gauze roll.    Red satellite lesions / fungal presentation noted to bilateral gluteal aspects. Recommending skin care and topical treatment with application of nystatin cream.    Scattered scabbing is noted across back and left arm. Recommending skin care and hygiene with application of purple top moisturizer after daily bathing.    Recommending to implement pressure reduction measures including every two hour turns and offloading heels at all times.      Impression: Erosion around PEG tube site to left abdomen. Deep tissue injury to right heel. Scattered ulcerations to BLE and right fifth toe. Satellite lesions / fungal presentation to bilateral gluteal aspects. Scattered scabbing across back and left arm.      Short term goals: Regain skin integrity, skin protection, pressure reduction, daily dressing changes, skin care, topical treatment.      Cynthia Solis RN    7/11/2025    13:19 EDT

## 2025-07-11 NOTE — PLAN OF CARE
Goal Outcome Evaluation:  Plan of Care Reviewed With: patient        Progress: no change          Patient has been in bed resting with eyes closed today and appears to be sleeping. C/O pain to abs early in shift an TYL given routine and was effective. Daughter has called and been at bedside today. Patient is on ABT with no adverse reactions. Oxygen dropped to 80's today and placed on 2L of oxygen NC and was sating at 94% Sats have since increased and decreased to 1l and is sating at 96%. Patient is going to have a CT scan of ABD this evening after barium is completed through gtube. First 450ml given around 530p. Second will need to be given 2 hours later. Patient appears to be comfortable at this time. Wound care dressed wounds today and placed orders. Cultures obtained and sent to lab for gtube and leg wound. Stool sent off for cdiff as well. Daughter wants to be notify if about anything of importance.

## 2025-07-11 NOTE — PROGRESS NOTES
[facility]     Progress Note    Patient Name: Isha Llanes  : 1965  MRN: [id number]  Primary Care Physician:  Provider, No Known  Date of admission: 7/10/2025    Subjective   Subjective     Chief Complaint: Wound care recommends that patient has some infection at the site of the G-tube I will start her with antibiotic empirically    HPI: Wound care on the board taking care of that otherwise patient does not appear to have any other problems does not appear to have any nausea vomiting    Review of Systems   All systems were reviewed and negative except for: Has been reviewed    Objective   Objective     Vitals:   Temp:  [97.3 °F (36.3 °C)-97.9 °F (36.6 °C)] 97.5 °F (36.4 °C)  Heart Rate:  [] 108  Resp:  [18-20] 20  BP: (76-97)/(58-73) 97/65    Physical Exam    Constitutional: Alert and oriented not in acute distress   Eyes: Pupils equal reacting to light and accommodate   HENT: Normocephalic   Neck: No lymphadenopathy   Respiratory: No rales or rhonchi   Cardiovascular: S1-S2 normal no S3 or S4   Gastrointestinal: No palpable organomegaly   Musculoskeletal: No deformities but patient has cellulitis of the leg   Neurologic: No localizing signs  Skin: No rash       Result Review    Result Review:  I have personally reviewed the results from the time of this admission to 2025 10:38 EDT and agree with these findings:  [x]  Laboratory  []  Microbiology  []  Radiology  []  EKG/Telemetry   []  Cardiology/Vascular   []  Pathology  []  Old records  []  Other:  Most notable findings include: Have been reviewed and discussed    Assessment & Plan   Assessment / Plan     Brief Patient Summary:  Isha Llanes is a 59 y.o. female who patient with cellulitis of the leg as well as possible infected G-tube site    Active Hospital Problems:  Active Hospital Problems    Diagnosis     **Dehydration        Plan:   Continue as per wound care and will also give IV antibiotics    VTE Prophylaxis:  No VTE  prophylaxis order currently exists.        CODE STATUS:   Code Status (Patient has no pulse and is not breathing): CPR (Attempt to Resuscitate)  Medical Interventions (Patient has pulse or is breathing): Full Support  Level Of Support Discussed With: Patient    Disposition:  I expect patient to be discharged after the patient has been stabilized.    Electronically signed by Teodoro Mancuso MD, 07/11/25, 10:38 AM EDT.      Part of this note may be an electronic transcription/translation of spoken language to printed text using the Dragon Dictation System.

## 2025-07-11 NOTE — CONSULTS
[facility]  Initial Inpatient Consult Note    Referring Provider: Hospitalist    Reason for Consultation: PEG tube infection    Subjective     History of present illness:    59 y.o. female with medical history of GERD, Asthma, DM II, and multiple malignancies currently being treated for anaplastic large cell lymphoma presented to the ED for evaluation of weakness, nausea, poor intake with G-tube, and diarrhea.  Patient states she has been having issues with her G-tube and feels as though it is infected.  She is having left sided abdominal discomfort and pain around her feeding tube.  She states she is also nauseated without vomiting.  She is also having diarrhea, which she says has slowed down since being admitted.  Patient denies any vomiting, BRBPR, melena, NSAID/thinner use, and fevers.    Past Medical History:  Past Medical History:   Diagnosis Date    Anesthesia     REPORTS HAS GOT REAL EMOTIONAL AND HAS BECOME ANGRY BEFORE    Anxiety     Aortic stenosis, severe     HAS BIO VALVE REPLACED    Arthritis     Asthma     Back pain     Blood clotting disorder     test to find out what type    Cancer     CHF (congestive heart failure)     Chronic low back pain with sciatica     Chronic pain disorder     COPD (chronic obstructive pulmonary disease)     Coronary artery disease     looking to follow with Cardio    Diabetes mellitus     TYPE 2    Dyspnea on effort     Fatty liver     GERD (gastroesophageal reflux disease)     H/O lumpectomy     H/O: hysterectomy     Hiatal hernia     History of degenerative disc disease     History of kidney stones     History of pulmonary embolus (PE)     History of transfusion     AS A CHILD NO TRANSFUSION REACTION    Hodgkin's lymphoma     5/19/23  5 YEARS SINCE LAST CHEMO TX    Hypertension     Immobility     Lupus     Nausea vomiting and diarrhea 03/07/2024    Panic disorder     Pelvic pain     Peripheral neuropathy     Personal history of DVT (deep vein  thrombosis)     over 20 years    PONV (postoperative nausea and vomiting)     Presence of intrathecal pump     PTSD (post-traumatic stress disorder)     Sacroiliac joint disease     SI (sacroiliac) joint inflammation     Venous insufficiency      Past Surgical History:  Past Surgical History:   Procedure Laterality Date    ABDOMINAL SURGERY      BACK SURGERY      SPINAL FUSION     BACK SURGERY      BLADDER REPAIR      BRONCHOSCOPY WITH ION ROBOTIC ASSIST N/A 4/22/2025    Procedure: BRONCHOSCOPY WITH ION ROBOT: REBUS, EBUS, NEEDLE ASPIRATES, BIOPSIES, BRUSHINGS, BAL, WASHINGS: insertion of lighted robot navigated instrument to view inside the lung;  Surgeon: Enzo Segal MD;  Location: [facility] MAIN OR;  Service: Robotics - Pulmonary;  Laterality: N/A;    CARDIAC CATHETERIZATION N/A 03/06/2019    Procedure: Valvuloplasty;  Surgeon: Wayne Johnson MD;  Location: [facility] CATH INVASIVE LOCATION;  Service: Cardiology    CARDIAC CATHETERIZATION      CARDIAC CATHETERIZATION Left 05/31/2023    Procedure: Cardiac Catheterization/Vascular Study;  Surgeon: Poncho Reid MD;  Location: [facility] CATH INVASIVE LOCATION;  Service: Cardiovascular;  Laterality: Left;    CARDIAC SURGERY      Mechanical valve    CARDIAC VALVE REPLACEMENT  2021    CHOLECYSTECTOMY      COLONOSCOPY N/A 12/29/2021    Procedure: COLONOSCOPY;  Surgeon: Karine Orlando MD;  Location: [facility] ENDOSCOPY;  Service: Gastroenterology;  Laterality: N/A;  POOR PREP    COLONOSCOPY N/A 04/13/2022    Procedure: COLONOSCOPY WITH POLYPECTOMY;  Surgeon: Karine Orlando MD;  Location: [facility] ENDOSCOPY;  Service: Gastroenterology;  Laterality: N/A;  COLON POLYP, HEMORRHOIDS, POOR PREP    COLONOSCOPY      DIRECT LARYNGOSCOPY, ESOPHAGOSCOPY, BRONCHOSCOPY N/A 05/22/2023    Procedure: DIRECT LARYNGOSCOPY WITH BIOPSIES , ESOPHAGOSCOPY, BRONCHOSCOPY;  Surgeon: Ronnie Mcgarry MD;  Location: [facility] OR OSC;  Service: ENT;  Laterality: N/A;     ENDOSCOPY N/A 12/29/2021    Procedure: ESOPHAGOGASTRODUODENOSCOPY;  Surgeon: Karine Orlando MD;  Location: [facility] ENDOSCOPY;  Service: Gastroenterology;  Laterality: N/A;  ESOPHAGITIS, GASTRITIS, HIATAL HERNIA    ENDOSCOPY      ENDOSCOPY N/A 04/16/2024    Procedure: ESOPHAGOGASTRODUODENOSCOPY WITH BIOPSIES;  Surgeon: Karine Orlando MD;  Location: [facility] ENDOSCOPY;  Service: Gastroenterology;  Laterality: N/A;  ESOPHAGITIS, HIATAL HERNIA    ENDOSCOPY W/ PEG TUBE PLACEMENT N/A 12/10/2024    Procedure: ESOPHAGOGASTRODUODENOSCOPY WITH PERCUTANEOUS ENDOSCOPIC GASTROSTOMY TUBE INSERTION WITH ANESTHESIA;  Surgeon: Rodger Ortega MD;  Location: [facility] ENDOSCOPY;  Service: Gastroenterology;  Laterality: N/A;  PEG TUBE INSERTION    HYSTERECTOMY      LYMPHADENECTOMY      PAIN PUMP INSERTION/REVISION N/A 10/18/2019    Procedure: PAIN PUMP INSERTION [facility] 10-18-19 [place];  Surgeon: Horace Rios MD;  Location: [facility];  Service: Pain Management    PAIN PUMP INSERTION/REVISION N/A 11/23/2020    Procedure: pain pump removal;  Surgeon: Horace Rios MD;  Location: [facility];  Service: Pain Management;  Laterality: N/A;    PEG TUBE INSERTION N/A 07/26/2023    Procedure: PERCUTANEOUS ENDOSCOPIC GASTROSTOMY TUBE INSERTION;  Surgeon: Kody Mercer MD;  Location: [facility] ENDOSCOPY;  Service: General;  Laterality: N/A;  SUCCESSFUL PEG TUBE PLACE PLACEMENT    PORTACATH PLACEMENT      IN LEFT CHEST REPORTS THAT IT IS NOT FUNCTIONING    SKIN BIOPSY      TONSILLECTOMY      TUBAL ABDOMINAL LIGATION      TUMOR REMOVAL      vaginal /anal area      Social History:   Social History     Tobacco Use    Smoking status: Every Day     Current packs/day: 2.00     Average packs/day: 2.0 packs/day for 46.5 years (93.0 ttl pk-yrs)     Types: Cigarettes     Start date: 1979     Passive exposure: Current    Smokeless tobacco: Never    Tobacco comments:     Currently smoking 1.5 daily. 04--js    Substance Use Topics    Alcohol use: Never      Family History:  Family History   Problem Relation Age of Onset    Heart failure Mother     Anxiety disorder Mother     Prostate cancer Father     Depression Sister     Bipolar disorder Sister     Pancreatic cancer Sister     ADD / ADHD Brother     Thyroid cancer Maternal Grandmother     Pancreatic cancer Paternal Grandmother     ADD / ADHD Grandson     ADD / ADHD Granddaughter     ADD / ADHD Nephew     [place] Hyperthermia Neg Hx        Home Meds:  Medications Prior to Admission   Medication Sig Dispense Refill Last Dose/Taking    acetaminophen (TYLENOL) 500 MG tablet Take 2 tablets by mouth Daily.   7/10/2025    ALPRAZolam (XANAX) 0.25 MG tablet Take 1 tablet by mouth 2 (Two) Times a Day.   Taking    brentuximab (ADCETRIS) 50 MG chemo injection Infuse 50 mg into a venous catheter 1 (One) Time.   7/9/2025    budesonide (PULMICORT) 0.5 MG/2ML nebulizer solution Take 2 mL by nebulization 2 (Two) Times a Day.   Taking    famotidine (Pepcid) 40 MG tablet Take 1 tablet by mouth every night at bedtime. 90 tablet 1 Taking    furosemide (LASIX) 20 MG tablet Take 1 tablet by mouth Daily.   Taking    HYDROcodone-acetaminophen (NORCO)  MG per tablet Take 1 tablet by mouth Every 4 (Four) Hours As Needed for Mild Pain.   Taking As Needed    Insulin Lispro, 1 Unit Dial, (HUMALOG) 100 UNIT/ML solution pen-injector Inject 12 Units under the skin into the appropriate area as directed Daily.   Taking    ipratropium-albuterol (DUO-NEB) 0.5-2.5 mg/3 ml nebulizer Take 3 mL by nebulization 4 (Four) Times a Day As Needed for Wheezing for up to 30 days. 360 mL 5 Taking As Needed    levoFLOXacin (LEVAQUIN) 500 MG tablet Take 1 tablet by mouth Daily.   Taking    loperamide (IMODIUM) 2 MG capsule Take 1 capsule by mouth 4 (Four) Times a Day As Needed for Diarrhea (2 with 1st episode of diarrhea and 1 with each additonal episode, max of 8 per day.). 120 capsule 1 Taking As Needed     melatonin 5 MG tablet tablet Take 1 tablet by mouth Every Night.   Taking    metoprolol succinate XL (TOPROL-XL) 25 MG 24 hr tablet Take 1 tablet by mouth every night at bedtime. 90 tablet 3 Taking    nystatin (MYCOSTATIN) 100,000 unit/mL suspension Take 4 mL by mouth 4 (Four) Times a Day. For 14 days   Taking    ondansetron ODT (ZOFRAN-ODT) 8 MG disintegrating tablet Place 1 tablet on the tongue Every 8 (Eight) Hours As Needed for Nausea or Vomiting.   Taking As Needed    oxyCODONE (ROXICODONE) 10 MG tablet Take 1 tablet by mouth Every 4 (Four) Hours As Needed for Pain.   Taking As Needed    PARoxetine (PAXIL) 20 MG tablet Take 1 tablet by mouth Every Morning.   Taking    predniSONE (DELTASONE) 5 MG tablet Take 3 tablets by mouth 2 (Two) Times a Day. 15mg in am 15mg in pm (Patient taking differently: Take 3 tablets by mouth 3 times a day.)   7/10/2025    triamcinolone (KENALOG) 0.1 % cream Apply 1 Application topically to the appropriate area as directed 2 (Two) Times a Day. For 14 days   Taking    pain patient supplied pump by Intrathecal route Continuous. Type of Medication: Hydromorphone 15 mg/ml and Bupivacaine 0.5 mg/ml  [facility] [phone]  Provider:Dr. Boudreaux  Provider number: [phone]  Basal Dose: Hydromorphone 7.518 mg/day Bupivacaine 0.18692 mg/day  Pump capacity: 40ml  ( MAX of Hydromorphone 9.456 mg/ day; Bupivacaine 0.43916 mg /day)  Bolus Dose:Hydromorphone 0.500 mg, Bupivacaine 0.94179 mg  Max activations: 4 per day  Lockout 3 hours. 1 Bolus per every 3 hours   Pump manufacture:[employer]  Refill pump before 07/06/25        Current Meds:   acetaminophen, 1,000 mg, Oral, Daily  ALPRAZolam, 0.25 mg, Oral, BID  budesonide, 0.5 mg, Nebulization, BID - RT  cefTRIAXone, 1,000 mg, Intravenous, Q24H  famotidine, 40 mg, Oral, BID AC  melatonin, 5 mg, Oral, Nightly  metoprolol succinate XL, 25 mg, Oral, Q12H  nystatin, 5 mL, Oral, 4x Daily  PARoxetine, 20 mg, Oral, QAM  predniSONE, 15 mg, Oral,  BID  senna-docusate sodium, 2 tablet, Oral, BID  sodium chloride, 10 mL, Intravenous, Q12H  triamcinolone, 1 Application, Topical, BID      Allergies:  Allergies   Allergen Reactions    Amoxicillin Shortness Of Breath     Has tolerated Cefazolin, Ceftriaxone, and Cefepime -Jermaine Melida, [facility]    Ceclor [Cefaclor] Shortness Of Breath     Has tolerated Cefazolin, Ceftriaxone, and Cefepime -Jermaine Melida, [facility]      Penicillins Shortness Of Breath     Has tolerated Cefazolin, Ceftriaxone, and Cefepime [facility]    Sulfa Antibiotics Rash    Valium [Diazepam] Mental Status Change     DEPRESSED    Ambien [Zolpidem] Mental Status Change    Aspirin GI Intolerance    Ativan [Lorazepam] Mental Status Change    Benadryl [Diphenhydramine] Anxiety     AND MAKES HER HYPER    Biaxin [Clarithromycin] Unknown - Low Severity    Cephalexin Unknown - Low Severity    Clindamycin Unknown - Low Severity    Compazine [Prochlorperazine Edisylate] Rash    Contrast Dye (Echo Or Unknown Ct/Mr) Other (See Comments)     Caused pain in her arm    Doxycycline Rash    Nsaids GI Intolerance    Phenergan [Promethazine Hcl] GI Intolerance    Promethazine GI Intolerance    Vancomycin Itching     Review of Systems  Pertinent items are noted in HPI     Objective     Vital Signs  Temp:  [97.3 °F (36.3 °C)-97.9 °F (36.6 °C)] 97.5 °F (36.4 °C)  Heart Rate:  [] 113  Resp:  [18-24] 24  BP: (76-97)/(58-73) 91/68  Physical Exam:  General Appearance:    Alert, cooperative, in no acute distress   Head:    Normocephalic, without obvious abnormality, atraumatic   Eyes:          conjunctivae and sclerae normal, no icterus   Throat:   no thrush, oral mucosa moist   Neck:   Supple, no adenopathy   Lungs:     Unlabored breathing     Heart:    Regular rhythm and normal rate    Chest Wall:    No abnormalities observed   Abdomen:     Soft, non distended, tender around G-tube and LLQ, no obvious abscess seen   Extremities:   no edema, no redness    Skin:   No bruising or rash   Psychiatric:  normal mood and insight     Results Review:   I reviewed the patient's new clinical results.    Results from last 7 days   Lab Units 07/11/25  0510 07/10/25  1848 07/10/25  1611   WBC 10*3/mm3 10.93* 10.11 11.07*   HEMOGLOBIN g/dL 10.5* 10.1* 10.8*   HEMATOCRIT % 34.5 33.2* 35.5   PLATELETS 10*3/mm3 149 114* 124*     Results from last 7 days   Lab Units 07/11/25  0510 07/10/25  1848 07/10/25  1611   SODIUM mmol/L 131* 129* 127*   POTASSIUM mmol/L 4.3 4.6 5.0   CHLORIDE mmol/L 94* 95* 91*   CO2 mmol/L 20.1* 19.3* 21.1*   BUN mg/dL 10.1 13.8 16.9   CREATININE mg/dL 0.21* 0.19* 0.27*   CALCIUM mg/dL 8.1* 7.6* 8.7   BILIRUBIN mg/dL 0.2 0.2 0.3   ALK PHOS U/L 120* 127* 122*   ALT (SGPT) U/L 7 7 7   AST (SGOT) U/L 8 11 7   GLUCOSE mg/dL 164* 161* 185*     Results from last 7 days   Lab Units 07/11/25  0510   INR  1.16*     Lab Results   Lab Value Date/Time    LIPASE 27 07/02/2025 1334    LIPASE 66 (H) 06/05/2025 1106    LIPASE 48 05/06/2025 1921    LIPASE 41 03/17/2025 2209    LIPASE 22 07/20/2024 0822    LIPASE 81 (H) 05/29/2024 2309    LIPASE 18 04/27/2024 1043    LIPASE 39 03/07/2024 1331    LIPASE 10 (L) 02/06/2024 0241    LIPASE 8 (L) 05/26/2023 1200    LIPASE 17 11/27/2022 0456    LIPASE 17 09/05/2022 2046    LIPASE 24 05/08/2022 2159    LIPASE 23 01/21/2022 1304    LIPASE 21 01/05/2019 1409       Radiology:  XR Chest 1 View  Result Date: 7/10/2025  Impression: No acute process identified. Electronically Signed: Werner Linares MD  7/10/2025 3:13 PM EDT  Workstation ID: [id number]       Assessment & Plan     Dehydration       Assessment:  PEG tube infection  Abdominal pain  Nausea    Plan:  Will order CT Abdomen/Pelvis to R/O abscess-patient is allergic to contrast so will try oral contrast   Patient C-diff was negative  Patient has slight elevated WBC-10.93  Depending on CT Abdomen/pelvis-patient could need PEG removed and replaced  Patient understands and agrees to the  plan      I discussed the patients findings and my recommendations with patient.    Electronically signed by LUZ Todd, 07/11/25, 11:53 AM EDT.    Attending Attestation  I reviewed the below documentation and evaluation and discussed the care plan with LUZ Todd, I agree with her findings and plan as documented.  Agree with above.  Plan for CT scan of the abdomen pelvis to rule out G-tube related abscess and confirm placement of G-tube.  Please give oral contrast via G-tube and water-soluble contrast.  Discussed with nurse at the bedside  Continue antibiotics  Electronically signed by Rodger Ortega MD, 07/11/25, 2:57 PM EDT.    Portions of this documentation were transcribed electronically from a voice recognition software.  I confirm all data accurately represents the service(s) I performed at today's visit.

## 2025-07-11 NOTE — PLAN OF CARE
Goal Outcome Evaluation:  Plan of Care Reviewed With: patient        Progress: no change  Outcome Evaluation: Pt answers all questions appropriately. Pt has been lethargic this shift. BP has trended back up some. Pain medicine given this AM for pt c/o pain in back area 9/10. Pt able to rest after medicine given. Urine and stool sample still needed.

## 2025-07-12 ENCOUNTER — APPOINTMENT (OUTPATIENT)
Dept: CARDIOLOGY | Facility: HOSPITAL | Age: 60
End: 2025-07-12
Payer: MEDICAID

## 2025-07-12 LAB
ALBUMIN SERPL-MCNC: 1.7 G/DL (ref 3.5–5.2)
ALBUMIN/GLOB SERPL: 0.5 G/DL
ALP SERPL-CCNC: 100 U/L (ref 39–117)
ALT SERPL W P-5'-P-CCNC: 6 U/L (ref 1–33)
ANION GAP SERPL CALCULATED.3IONS-SCNC: 14.4 MMOL/L (ref 5–15)
AST SERPL-CCNC: 9 U/L (ref 1–32)
BACTERIA UR QL AUTO: ABNORMAL /HPF
BASOPHILS # BLD AUTO: 0.01 10*3/MM3 (ref 0–0.2)
BASOPHILS NFR BLD AUTO: 0.1 % (ref 0–1.5)
BILIRUB SERPL-MCNC: 0.2 MG/DL (ref 0–1.2)
BILIRUB UR QL STRIP: NEGATIVE
BUN SERPL-MCNC: 6.3 MG/DL (ref 6–20)
BUN/CREAT SERPL: 37.1 (ref 7–25)
CALCIUM SPEC-SCNC: 8.1 MG/DL (ref 8.6–10.5)
CHLORIDE SERPL-SCNC: 94 MMOL/L (ref 98–107)
CLARITY UR: CLEAR
CO2 SERPL-SCNC: 21.6 MMOL/L (ref 22–29)
COLOR UR: YELLOW
CREAT SERPL-MCNC: 0.17 MG/DL (ref 0.57–1)
DEPRECATED RDW RBC AUTO: 50.4 FL (ref 37–54)
EGFRCR SERPLBLD CKD-EPI 2021: 140.3 ML/MIN/1.73
EOSINOPHIL # BLD AUTO: 0 10*3/MM3 (ref 0–0.4)
EOSINOPHIL NFR BLD AUTO: 0 % (ref 0.3–6.2)
ERYTHROCYTE [DISTWIDTH] IN BLOOD BY AUTOMATED COUNT: 16.9 % (ref 12.3–15.4)
GLOBULIN UR ELPH-MCNC: 3.7 GM/DL
GLUCOSE BLDC GLUCOMTR-MCNC: 294 MG/DL (ref 70–99)
GLUCOSE SERPL-MCNC: 198 MG/DL (ref 65–99)
GLUCOSE UR STRIP-MCNC: ABNORMAL MG/DL
HCT VFR BLD AUTO: 35.7 % (ref 34–46.6)
HGB BLD-MCNC: 10.4 G/DL (ref 12–15.9)
HGB UR QL STRIP.AUTO: NEGATIVE
HYALINE CASTS UR QL AUTO: ABNORMAL /LPF
IMM GRANULOCYTES # BLD AUTO: 0.05 10*3/MM3 (ref 0–0.05)
IMM GRANULOCYTES NFR BLD AUTO: 0.7 % (ref 0–0.5)
KETONES UR QL STRIP: ABNORMAL
LEUKOCYTE ESTERASE UR QL STRIP.AUTO: NEGATIVE
LYMPHOCYTES # BLD AUTO: 0.19 10*3/MM3 (ref 0.7–3.1)
LYMPHOCYTES NFR BLD AUTO: 2.8 % (ref 19.6–45.3)
MAGNESIUM SERPL-MCNC: 2 MG/DL (ref 1.6–2.6)
MCH RBC QN AUTO: 24.1 PG (ref 26.6–33)
MCHC RBC AUTO-ENTMCNC: 29.1 G/DL (ref 31.5–35.7)
MCV RBC AUTO: 82.8 FL (ref 79–97)
MONOCYTES # BLD AUTO: 0.26 10*3/MM3 (ref 0.1–0.9)
MONOCYTES NFR BLD AUTO: 3.8 % (ref 5–12)
NEUTROPHILS NFR BLD AUTO: 6.31 10*3/MM3 (ref 1.7–7)
NEUTROPHILS NFR BLD AUTO: 92.6 % (ref 42.7–76)
NITRITE UR QL STRIP: NEGATIVE
NRBC BLD AUTO-RTO: 0 /100 WBC (ref 0–0.2)
PH UR STRIP.AUTO: 6 [PH] (ref 5–8)
PLATELET # BLD AUTO: 129 10*3/MM3 (ref 140–450)
PMV BLD AUTO: 10.7 FL (ref 6–12)
POTASSIUM SERPL-SCNC: 4.5 MMOL/L (ref 3.5–5.2)
PROT SERPL-MCNC: 5.4 G/DL (ref 6–8.5)
PROT UR QL STRIP: ABNORMAL
RBC # BLD AUTO: 4.31 10*6/MM3 (ref 3.77–5.28)
RBC # UR STRIP: ABNORMAL /HPF
REF LAB TEST METHOD: ABNORMAL
SODIUM SERPL-SCNC: 130 MMOL/L (ref 136–145)
SP GR UR STRIP: >=1.03 (ref 1–1.03)
SQUAMOUS #/AREA URNS HPF: ABNORMAL /HPF
UROBILINOGEN UR QL STRIP: ABNORMAL
WBC # UR STRIP: ABNORMAL /HPF
WBC NRBC COR # BLD AUTO: 6.82 10*3/MM3 (ref 3.4–10.8)
YEAST URNS QL MICRO: ABNORMAL /HPF

## 2025-07-12 PROCEDURE — G0378 HOSPITAL OBSERVATION PER HR: HCPCS

## 2025-07-12 PROCEDURE — 63710000001 ONDANSETRON ODT 4 MG TABLET DISPERSIBLE: Performed by: INTERNAL MEDICINE

## 2025-07-12 PROCEDURE — 82948 REAGENT STRIP/BLOOD GLUCOSE: CPT | Performed by: INTERNAL MEDICINE

## 2025-07-12 PROCEDURE — 93306 TTE W/DOPPLER COMPLETE: CPT

## 2025-07-12 PROCEDURE — 97602 WOUND(S) CARE NON-SELECTIVE: CPT

## 2025-07-12 PROCEDURE — 85025 COMPLETE CBC W/AUTO DIFF WBC: CPT | Performed by: INTERNAL MEDICINE

## 2025-07-12 PROCEDURE — 80053 COMPREHEN METABOLIC PANEL: CPT | Performed by: INTERNAL MEDICINE

## 2025-07-12 PROCEDURE — 25010000002 FUROSEMIDE PER 20 MG: Performed by: INTERNAL MEDICINE

## 2025-07-12 PROCEDURE — 87086 URINE CULTURE/COLONY COUNT: CPT | Performed by: INTERNAL MEDICINE

## 2025-07-12 PROCEDURE — 96376 TX/PRO/DX INJ SAME DRUG ADON: CPT

## 2025-07-12 PROCEDURE — 25010000002 CEFTRIAXONE PER 250 MG: Performed by: INTERNAL MEDICINE

## 2025-07-12 PROCEDURE — 83735 ASSAY OF MAGNESIUM: CPT | Performed by: INTERNAL MEDICINE

## 2025-07-12 PROCEDURE — 81001 URINALYSIS AUTO W/SCOPE: CPT | Performed by: INTERNAL MEDICINE

## 2025-07-12 PROCEDURE — 96375 TX/PRO/DX INJ NEW DRUG ADDON: CPT

## 2025-07-12 PROCEDURE — 63710000001 PREDNISONE PER 5 MG: Performed by: INTERNAL MEDICINE

## 2025-07-12 RX ORDER — FUROSEMIDE 10 MG/ML
40 INJECTION INTRAMUSCULAR; INTRAVENOUS
Status: DISCONTINUED | OUTPATIENT
Start: 2025-07-12 | End: 2025-07-15 | Stop reason: HOSPADM

## 2025-07-12 RX ORDER — DIPHENHYDRAMINE HYDROCHLORIDE AND LIDOCAINE HYDROCHLORIDE AND ALUMINUM HYDROXIDE AND MAGNESIUM HYDRO
5 KIT EVERY 6 HOURS
Status: DISPENSED | OUTPATIENT
Start: 2025-07-12 | End: 2025-07-14

## 2025-07-12 RX ADMIN — PREDNISONE 15 MG: 10 TABLET ORAL at 08:22

## 2025-07-12 RX ADMIN — NYSTATIN 500000 UNITS: 100000 SUSPENSION ORAL at 18:22

## 2025-07-12 RX ADMIN — TRIAMCINOLONE ACETONIDE 1 APPLICATION: 1 CREAM TOPICAL at 20:53

## 2025-07-12 RX ADMIN — Medication 10 ML: at 08:21

## 2025-07-12 RX ADMIN — DIPHENHYDRAMINE HYDROCHLORIDE AND LIDOCAINE HYDROCHLORIDE AND ALUMINUM HYDROXIDE AND MAGNESIUM HYDRO 5 ML: KIT at 20:50

## 2025-07-12 RX ADMIN — Medication 10 ML: at 20:50

## 2025-07-12 RX ADMIN — FUROSEMIDE 40 MG: 10 INJECTION, SOLUTION INTRAMUSCULAR; INTRAVENOUS at 13:19

## 2025-07-12 RX ADMIN — FAMOTIDINE 40 MG: 20 TABLET, FILM COATED ORAL at 18:21

## 2025-07-12 RX ADMIN — NYSTATIN 500000 UNITS: 100000 SUSPENSION ORAL at 13:19

## 2025-07-12 RX ADMIN — DIPHENHYDRAMINE HYDROCHLORIDE AND LIDOCAINE HYDROCHLORIDE AND ALUMINUM HYDROXIDE AND MAGNESIUM HYDRO 5 ML: KIT at 18:22

## 2025-07-12 RX ADMIN — LOPERAMIDE HYDROCHLORIDE 2 MG: 2 CAPSULE ORAL at 20:52

## 2025-07-12 RX ADMIN — ALPRAZOLAM 0.25 MG: 0.25 TABLET ORAL at 08:23

## 2025-07-12 RX ADMIN — TRIAMCINOLONE ACETONIDE 1 APPLICATION: 1 CREAM TOPICAL at 08:25

## 2025-07-12 RX ADMIN — DOCUSATE SODIUM 50 MG AND SENNOSIDES 8.6 MG 2 TABLET: 8.6; 5 TABLET, FILM COATED ORAL at 08:22

## 2025-07-12 RX ADMIN — PREDNISONE 15 MG: 10 TABLET ORAL at 20:51

## 2025-07-12 RX ADMIN — ONDANSETRON 8 MG: 4 TABLET, ORALLY DISINTEGRATING ORAL at 06:37

## 2025-07-12 RX ADMIN — HYDROCODONE BITARTRATE AND ACETAMINOPHEN 1 TABLET: 10; 325 TABLET ORAL at 18:21

## 2025-07-12 RX ADMIN — CEFTRIAXONE SODIUM 1000 MG: 1 INJECTION, POWDER, FOR SOLUTION INTRAMUSCULAR; INTRAVENOUS at 13:19

## 2025-07-12 RX ADMIN — HYDROCODONE BITARTRATE AND ACETAMINOPHEN 1 TABLET: 10; 325 TABLET ORAL at 06:28

## 2025-07-12 RX ADMIN — HYDROCODONE BITARTRATE AND ACETAMINOPHEN 1 TABLET: 10; 325 TABLET ORAL at 00:53

## 2025-07-12 RX ADMIN — HYDROCODONE BITARTRATE AND ACETAMINOPHEN 1 TABLET: 10; 325 TABLET ORAL at 13:21

## 2025-07-12 RX ADMIN — FUROSEMIDE 40 MG: 10 INJECTION, SOLUTION INTRAMUSCULAR; INTRAVENOUS at 18:21

## 2025-07-12 RX ADMIN — ONDANSETRON 8 MG: 4 TABLET, ORALLY DISINTEGRATING ORAL at 20:52

## 2025-07-12 RX ADMIN — ALPRAZOLAM 0.25 MG: 0.25 TABLET ORAL at 20:52

## 2025-07-12 RX ADMIN — NYSTATIN 1 APPLICATION: 100000 OINTMENT TOPICAL at 20:51

## 2025-07-12 RX ADMIN — FAMOTIDINE 40 MG: 20 TABLET, FILM COATED ORAL at 08:22

## 2025-07-12 RX ADMIN — HYDROCODONE BITARTRATE AND ACETAMINOPHEN 1 TABLET: 10; 325 TABLET ORAL at 22:47

## 2025-07-12 RX ADMIN — PAROXETINE 20 MG: 20 TABLET, FILM COATED ORAL at 08:23

## 2025-07-12 RX ADMIN — NYSTATIN 500000 UNITS: 100000 SUSPENSION ORAL at 20:51

## 2025-07-12 RX ADMIN — ACETAMINOPHEN 1000 MG: 500 TABLET ORAL at 08:22

## 2025-07-12 NOTE — PROGRESS NOTES
[facility]     Progress Note    Patient Name: Isha Llanes  : 1965  MRN: [id number]  Primary Care Physician:  Provider, No Known  Date of admission: 7/10/2025    Subjective   Subjective     Chief Complaint: Continues to complain of chest pain left upper quadrant abdominal pain and has elevated BNP her, echocardiogram 3 months ago was normal she does take some cardiotoxic drugs she also has neuropathies Main concern is with the G-tube and we are waiting for recommendation from gastroenterology daughter wants G-tube, changed does not appear to be that much infected because there is no more drainage it looks like there was more leakage from the G-tube site    HPI: We will resume her feedings in the G-tube site does not appear to be infected    Review of Systems   All systems were reviewed and negative except for: Has been reviewed    Objective   Objective     Vitals:   Temp:  [96.8 °F (36 °C)-97.5 °F (36.4 °C)] 96.8 °F (36 °C)  Heart Rate:  [] 88  Resp:  [18-20] 18  BP: ()/(67-74) 99/74  Flow (L/min) (Oxygen Therapy):  [2] 2    Physical Exam    Constitutional: Alert and oriented not in acute distress   Eyes: Pupils equal reacting to light and accommodation   HENT: Normocephalic   Neck: No lymphadenopathy   Respiratory: No rales or rhonchi   Cardiovascular: S1-S2 normal no S3 or S   Gastrointestinal: No palpable organomegaly G-tube site appears to be not infected   Musculoskeletal: No deformity   Neurologic: No localizing signs  Skin: No rash       Result Review    Result Review:  I have personally reviewed the results from the time of this admission to 2025 10:59 EDT and agree with these findings:  [x]  Laboratory  []  Microbiology  [x]  Radiology  []  EKG/Telemetry   []  Cardiology/Vascular   []  Pathology  []  Old records  []  Other:  Most notable findings include: Have been reviewed and discussed    Assessment & Plan   Assessment / Plan     Brief Patient Summary:  Isha LIND  Shraddha is a 59 y.o. female who patient with history of anaplastic large cell lymphoma    Active Hospital Problems:  Active Hospital Problems    Diagnosis     **Dehydration        Plan:   Continue current management will get cardiology consulted    VTE Prophylaxis:  No VTE prophylaxis order currently exists.        CODE STATUS:   Code Status (Patient has no pulse and is not breathing): CPR (Attempt to Resuscitate)  Medical Interventions (Patient has pulse or is breathing): Full Support  Level Of Support Discussed With: Patient    Disposition:  I expect patient to be discharged after the patient has been stable.    Electronically signed by Teodoro Mancuso MD, 07/12/25, 10:59 AM EDT.      Part of this note may be an electronic transcription/translation of spoken language to printed text using the Dragon Dictation System.

## 2025-07-12 NOTE — PLAN OF CARE
Goal Outcome Evaluation:  Plan of Care Reviewed With: patient        Progress: no change  Outcome Evaluation: alert and oriented. complaints of abdominal pain. medicated as ordered. plan of care ongoing.

## 2025-07-12 NOTE — CONSULTS
Date of service: 7/12/2025    Reason for consultation: CHF    HPI: A 59-year-old female has multiple medical problems as will be stated below.  She was admitted for generalized weakness, weight loss, nausea, diarrhea, poor G-tube intake.  In addition, she has exertional dyspnea and bilateral chest soreness-pain.  She has no orthopnea, PND, palpitation, syncope or near syncope.  She has no recent pneumonia, bronchitis or COVID infection.    In 2023, she had acute non-STEMI type II.  Cardiac catheterization demonstrated near normal coronary arteries.  She had congestive heart failure status post TAVR.  She had an echocardiography in March 2025 that demonstrated normal LV systolic function, severe mitral regurgitation, significant mitral annular calcification and mitral stenosis.  She has been treated for lymphoma with chemotherapy.    Review of systems: As stated above.  In addition to fatigue, poor appetite and weight loss.  No headache, vertigo, tinnitus, GI or  bleed.  No fever or chills.  No TIA or CVA-like symptoms    Past medical and surgical history:    1.  As stated above  2.  Chronic diastolic CHF  3.  History of rheumatic fever  4.  DM type II and peripheral neuropathy  5.  Hypertension  6.  Valvular heart disease(aortic stenosis, status post TAVR, mitral stenosis, mitral regurgitation and tricuspid regurgitation).  7.  Anxiety-PTSD  8.  GERD-hiatal hernia  9.  COPD  10.  Hodgkin's lymphoma  11.  Throat cancer  12.  Venous insufficiency  13.  Other surgeries-procedures: Colonoscopy, EGD, bronchoscopy, spinal fusion, bladder repair, PEG tube placement, hysterectomy, adenectomy, tonsillectomy, tubal ligation and abdominal surgery,    Medications: See the patient's medications list.    Allergies: PCN, sulfa drugs, Valium's, Ambien, aspirin, Ativan, Biaxin, clindamycin, contrast dye, cephalexin, doxycycline, NSAIDs, Phenergan, vancomycin, promethazine, Ceclor, amoxicillin, and Compazine.    Social history:  Smokes cigarettes 1 PPD for 45 years.  No alcohol or illicit drug use.    Family history: No CAD.  Different cancers run in the family, including pancreatic and prostate cancers.    Physical examination: Cachectic.  She was in no pain or respiratory distress  Vital signs: Reviewed  HEENT: Sclerae: Pale but not icteric.    Neck: No JVD or carotid bruit.  Chest: CTA.  No wheezes or rales.  Cardiac: RRR.  2-3/6 holosystolic murmur over the apex and left sternal border.  Abdomen: Soft and nontender.  Extremities: Wrapped in gauze.  But no edema, cyanosis or clubbing..  Pulses intact.  Neuro: Alert, oriented no facial droop.  Speech: Hoarseness    Records: Reviewed    Assessment and plan:    1.  Acute diastolic CHF, mostly due to valvular heart disease (as stated above.  2.  Severe aortic stenosis, status post TAVR  3.  Mitral stenosis and severe mitral regurgitation.  4.  Echocardiography to reassess the severity of valvular heart disease  5.  Gentle IV diuresis  6.  Other medical problems as stated above  7.  Discussed with her daughter that her mother is not a candidate for a major cardiac surgery at this time, considering she is very frail and cancer with chemotherapy.  Therefore, she will be treated medically.

## 2025-07-12 NOTE — CONSULTS
Patient Name: Isha Llanes  YOB: 1965  MRN: [id number]  Admission date: 7/10/2025  Reason for Encounter: Tube Feed Consult    [facility] Clinical Nutrition Assessment     Subjective    Subjective Information     7/12: RD contacted by house supervisor regarding patient home tube feed regimen. RD reached out to patient's daughter who reports patient receives Ensure Plus for 5 cans per day to supplement along with oral intake. Daughter reports that patient has been eating very poorly d/t thrush and has been having nausea and diarrhea with Ensure. Will provide EN recommendations for EN during hospital course and will recommend adjusting to a different formulary at home.      Objective   H&P and Current Problems      H&P  Past Medical History:   Diagnosis Date    Anesthesia     REPORTS HAS GOT REAL EMOTIONAL AND HAS BECOME ANGRY BEFORE    Anxiety     Aortic stenosis, severe     HAS BIO VALVE REPLACED    Arthritis     Asthma     Back pain     Blood clotting disorder     test to find out what type    Cancer     CHF (congestive heart failure)     Chronic low back pain with sciatica     Chronic pain disorder     COPD (chronic obstructive pulmonary disease)     Coronary artery disease     looking to follow with Cardio    Diabetes mellitus     TYPE 2    Dyspnea on effort     Fatty liver     GERD (gastroesophageal reflux disease)     H/O lumpectomy     H/O: hysterectomy     Hiatal hernia     History of degenerative disc disease     History of kidney stones     History of pulmonary embolus (PE)     History of transfusion     AS A CHILD NO TRANSFUSION REACTION    Hodgkin's lymphoma     5/19/23  5 YEARS SINCE LAST CHEMO TX    Hypertension     Immobility     Lupus     Nausea vomiting and diarrhea 03/07/2024    Panic disorder     Pelvic pain     Peripheral neuropathy     Personal history of DVT (deep vein thrombosis)     over 20 years    PONV (postoperative nausea and vomiting)     Presence of intrathecal  pump     PTSD (post-traumatic stress disorder)     Sacroiliac joint disease     SI (sacroiliac) joint inflammation     Venous insufficiency       Past Surgical History:   Procedure Laterality Date    ABDOMINAL SURGERY      BACK SURGERY      SPINAL FUSION     BACK SURGERY      BLADDER REPAIR      BRONCHOSCOPY WITH ION ROBOTIC ASSIST N/A 4/22/2025    Procedure: BRONCHOSCOPY WITH ION ROBOT: REBUS, EBUS, NEEDLE ASPIRATES, BIOPSIES, BRUSHINGS, BAL, WASHINGS: insertion of lighted robot navigated instrument to view inside the lung;  Surgeon: Enzo Segal MD;  Location: [facility] MAIN OR;  Service: Robotics - Pulmonary;  Laterality: N/A;    CARDIAC CATHETERIZATION N/A 03/06/2019    Procedure: Valvuloplasty;  Surgeon: Wayne Johnson MD;  Location: [facility] CATH INVASIVE LOCATION;  Service: Cardiology    CARDIAC CATHETERIZATION      CARDIAC CATHETERIZATION Left 05/31/2023    Procedure: Cardiac Catheterization/Vascular Study;  Surgeon: Poncho Reid MD;  Location: [facility] CATH INVASIVE LOCATION;  Service: Cardiovascular;  Laterality: Left;    CARDIAC SURGERY      Mechanical valve    CARDIAC VALVE REPLACEMENT  2021    CHOLECYSTECTOMY      COLONOSCOPY N/A 12/29/2021    Procedure: COLONOSCOPY;  Surgeon: Karine Orlando MD;  Location: [facility] ENDOSCOPY;  Service: Gastroenterology;  Laterality: N/A;  POOR PREP    COLONOSCOPY N/A 04/13/2022    Procedure: COLONOSCOPY WITH POLYPECTOMY;  Surgeon: Karine Orlando MD;  Location: [facility] ENDOSCOPY;  Service: Gastroenterology;  Laterality: N/A;  COLON POLYP, HEMORRHOIDS, POOR PREP    COLONOSCOPY      DIRECT LARYNGOSCOPY, ESOPHAGOSCOPY, BRONCHOSCOPY N/A 05/22/2023    Procedure: DIRECT LARYNGOSCOPY WITH BIOPSIES , ESOPHAGOSCOPY, BRONCHOSCOPY;  Surgeon: Ronnie Mcgarry MD;  Location: [facility] OR OSC;  Service: ENT;  Laterality: N/A;    ENDOSCOPY N/A 12/29/2021    Procedure: ESOPHAGOGASTRODUODENOSCOPY;  Surgeon: Karine Orlando MD;  Location: [facility]  "ENDOSCOPY;  Service: Gastroenterology;  Laterality: N/A;  ESOPHAGITIS, GASTRITIS, HIATAL HERNIA    ENDOSCOPY      ENDOSCOPY N/A 04/16/2024    Procedure: ESOPHAGOGASTRODUODENOSCOPY WITH BIOPSIES;  Surgeon: Karine Orlando MD;  Location: [facility] ENDOSCOPY;  Service: Gastroenterology;  Laterality: N/A;  ESOPHAGITIS, HIATAL HERNIA    ENDOSCOPY W/ PEG TUBE PLACEMENT N/A 12/10/2024    Procedure: ESOPHAGOGASTRODUODENOSCOPY WITH PERCUTANEOUS ENDOSCOPIC GASTROSTOMY TUBE INSERTION WITH ANESTHESIA;  Surgeon: Rodger Ortega MD;  Location: [facility] ENDOSCOPY;  Service: Gastroenterology;  Laterality: N/A;  PEG TUBE INSERTION    HYSTERECTOMY      LYMPHADENECTOMY      PAIN PUMP INSERTION/REVISION N/A 10/18/2019    Procedure: PAIN PUMP INSERTION [facility] [address] [place];  Surgeon: Horace Rios MD;  Location: [facility];  Service: Pain Management    PAIN PUMP INSERTION/REVISION N/A 11/23/2020    Procedure: pain pump removal;  Surgeon: Horace Rios MD;  Location: [facility];  Service: Pain Management;  Laterality: N/A;    PEG TUBE INSERTION N/A 07/26/2023    Procedure: PERCUTANEOUS ENDOSCOPIC GASTROSTOMY TUBE INSERTION;  Surgeon: Kody Mercer MD;  Location: [facility] ENDOSCOPY;  Service: General;  Laterality: N/A;  SUCCESSFUL PEG TUBE PLACE PLACEMENT    PORTACATH PLACEMENT      IN LEFT CHEST REPORTS THAT IT IS NOT FUNCTIONING    SKIN BIOPSY      TONSILLECTOMY      TUBAL ABDOMINAL LIGATION      TUMOR REMOVAL      vaginal /anal area      Current Problems   Admission Diagnosis:  Dehydration [E86.0]  Hypotension, unspecified hypotension type [I95.9]    Problem List:    Dehydration      Other Applicable Nutrition Information:        Anthropometrics       Height: 160 cm (62.99\")  Weight: 48.9 kg (107 lb 12.9 oz) (07/10/25 2100)  Weight Method: Bed scale  BMI (Calculated): 19.1       Trending Weight Changes 07/12/25:weight loss of 33 lbs (23%) over 2 month(s)    Significant?  Yes       Weight History  Wt " Readings from Last 20 Encounters:   07/10/25 2100 48.9 kg (107 lb 12.9 oz)   07/10/25 1429 48.3 kg (106 lb 7.7 oz)   07/02/25 1157 51.4 kg (113 lb 5.1 oz)   06/27/25 0527 56.4 kg (124 lb 5.4 oz)   06/26/25 0532 52 kg (114 lb 10.2 oz)   06/25/25 0539 53.9 kg (118 lb 13.3 oz)   06/24/25 0823 52.4 kg (115 lb 8.3 oz)   06/09/25 0449 52.3 kg (115 lb 4.8 oz)   06/06/25 0542 58.6 kg (129 lb 3 oz)   06/05/25 1744 53.5 kg (117 lb 15.1 oz)   06/05/25 1030 61.8 kg (136 lb 3.9 oz)   05/27/25 1742 56.6 kg (124 lb 12.5 oz)   05/15/25 1332 63.7 kg (140 lb 6.9 oz)   05/04/25 0815 60.8 kg (134 lb 0.6 oz)   04/25/25 1132 59 kg (130 lb)   04/22/25 0634 58.9 kg (129 lb 13.6 oz)   04/10/25 1411 54.9 kg (121 lb)   04/17/25 1523 59 kg (130 lb)   04/09/25 1109 54.9 kg (121 lb)   03/28/25 2000 66.1 kg (145 lb 11.6 oz)   03/28/25 0920 62.4 kg (137 lb 9.6 oz)   03/26/25 1047 62.6 kg (138 lb)   03/18/25 0347 62.7 kg (138 lb 3.7 oz)   03/18/25 0023 63.8 kg (140 lb 10.5 oz)   02/08/25 0602 63.8 kg (140 lb 10.5 oz)   12/23/24 0750 61.3 kg (135 lb 0.5 oz)   12/11/24 0642 58.1 kg (128 lb 1.4 oz)   12/10/24 0541 61 kg (134 lb 7.7 oz)   12/09/24 0531 59.6 kg (131 lb 6.3 oz)   12/07/24 0745 64 kg (141 lb 1.5 oz)   11/07/24 0406 57.7 kg (127 lb 3.3 oz)   11/06/24 2148 59.9 kg (132 lb 0.9 oz)   09/13/24 0943 57.6 kg (127 lb)   08/21/24 1605 65.7 kg (144 lb 13.5 oz)        Labs      Comment: Labs reviewed - will order updated phosphorus      Results from last 7 days   Lab Units 07/12/25  0516 07/11/25  0510 07/10/25  1848 07/10/25  1718 07/10/25  1611   SODIUM mmol/L 130* 131* 129*  --  127*   POTASSIUM mmol/L 4.5 4.3 4.6  --  5.0   GLUCOSE mg/dL 198* 164* 161*  --  185*   BUN mg/dL 6.3 10.1 13.8  --  16.9   CREATININE mg/dL 0.17* 0.21* 0.19*  --  0.27*   CALCIUM mg/dL 8.1* 8.1* 7.6*  --  8.7   MAGNESIUM mg/dL 2.0 1.5*  --   --  1.6   ALBUMIN g/dL 1.7* 2.0* 1.7*  --  2.2*   LACTATE mmol/L  --   --   --  1.1  --    BILIRUBIN mg/dL 0.2 0.2 0.2  --  0.3    ALK PHOS U/L 100 120* 127*  --  122*   AST (SGOT) U/L 9 8 11  --  7   ALT (SGPT) U/L 6 7 7  --  7     Results from last 7 days   Lab Units 07/12/25  0516 07/11/25  0510 07/10/25  1848   PLATELETS 10*3/mm3 129* 149 114*   HEMOGLOBIN g/dL 10.4* 10.5* 10.1*   HEMATOCRIT % 35.7 34.5 33.2*     Lab Results   Component Value Date    HGBA1C 5.90 (H) 11/29/2023          Medications       Scheduled Medications acetaminophen, 1,000 mg, Oral, Daily  ALPRAZolam, 0.25 mg, Oral, BID  budesonide, 0.5 mg, Nebulization, BID - RT  cefTRIAXone, 1,000 mg, Intravenous, Q24H  famotidine, 40 mg, Oral, BID AC  furosemide, 40 mg, Intravenous, BID Diuretics  melatonin, 5 mg, Oral, Nightly  metoprolol succinate XL, 25 mg, Oral, Q12H  nystatin, 5 mL, Oral, 4x Daily  nystatin, 1 Application, Topical, 3 times per day  PARoxetine, 20 mg, Oral, QAM  predniSONE, 15 mg, Oral, BID  senna-docusate sodium, 2 tablet, Oral, BID  sodium chloride, 10 mL, Intravenous, Q12H  triamcinolone, 1 Application, Topical, BID        Infusions pain,         PRN Medications   aluminum-magnesium hydroxide-simethicone    senna-docusate sodium **AND** polyethylene glycol **AND** bisacodyl **AND** bisacodyl    HYDROcodone-acetaminophen    ipratropium-albuterol    loperamide    ondansetron    ondansetron ODT    promethazine    sodium chloride    sodium chloride    sodium chloride     Physical Findings      Chewing/Swallowing  Teeth Status: Mouth/Teeth WDL: .WDL except, gums, palate, teeth Teeth Symptoms: tooth/teeth missing  Chewing/Swallowing Issues: No issues identified at this time   Edema  Generalized Edema: 2+ (Mild)                   Foot, Left Edema: 1+ (Trace) Foot, Right Edema: 1+ (Trace)   Bowel Function  Stool Output  Stool Unmeasured Occurrence: 1 (07/12/25 0624)  Bowel Incontinence: Yes (07/12/25 0624)  Stool Amount: Medium (07/12/25 0624)  Stool Consistency: loose, liquid (07/12/25 0654)  Perineal Care: absorbent brief/pad changed, protective cream/ointment  applied, perineum cleansed, skin sealant/barrier applied (07/12/25 0624)  Last Bowel Movement: 07/11/25   I/Os  Intake & Output (last 3 days)         07/09 0701  07/10 0700 07/10 0701 07/11 0700 07/11 0701  07/12 0700 07/12 0701 07/13 0700    IV Piggyback  2000      Total Intake(mL/kg)  2000 (40.9)      Net  +2000              Urine Unmeasured Occurrence  1 x      Stool Unmeasured Occurrence   5 x            Lines, Drains, Airways, & Wounds       Active LDAs       Name Placement date Placement time Site Days Last dressing change    Peripheral IV 07/10/25 1612 20 G Posterior;Right Hand 07/10/25  1612  Hand  1     Gastrostomy/Enterostomy Percutaneous endoscopic gastrostomy (PEG) 1 20 Fr. LUQ 12/10/24  1307  LUQ  213     Single Lumen Implantable Port Left Subclavian --  --  Subclavian  --     Pump Device --  --  --  --     Rash 06/25/25 1010 Right upper back 06/25/25  1010  -- 17     Wound 01/02/25 1154 Right lateral leg 01/02/25  1154  -- 190     Wound 01/02/25 1158 Left distal calf 01/02/25  1158  -- 190     Wound 05/04/25 1206 Right distal calf 05/04/25  1206  -- 69     Wound 05/04/25 1255 Left cheek 05/04/25  1255  -- 68     Wound 06/10/25 1357 Right anterior fifth toe Vascular Ulcer 06/10/25  1357  -- 31     Wound 07/10/25 2100 Bilateral lower coccyx 07/10/25  2100  -- 1     Wound 07/10/25 2100 Left lower arm 07/10/25  2100  -- 1     Wound 07/10/25 2100 Left upper abdomen Infection 07/10/25  2100  -- 1     Wound 07/11/25 0858 Right posterior heel Pressure Injury 07/11/25  0858  -- 1                       Nutrition Focused Physical Exam     Trending NFPE      Malnutrition Severity Assessment            (  Estimated Needs      Energy Requirements    Weight for Calculation 49kg   Method for Estimation  30-35 kcals/kg   Daily Needs (kcal/day) 8276-6778   Protein Requirements    Weight for Calculation 49kg   Method for Estimation 1.2-1.5 gm/kg   Daily Needs (g/day) 59-74   Fluid Requirements     Method for Estimation  1 mL/kcal    Daily Needs (mL/day) 1715    )   Current Nutrition Orders & Evaluation of Intake      Oral Nutrition     Food Allergies  and Intolerances NKFA   Current PO Diet Diet: Regular/House; Fluid Consistency: Thin (IDDSI 0)   Oral Nutrition Supplement None     Trending % PO Intake 07/12/25:No PO intake reported    (2)  Assessment & Plan   Nutrition Diagnosis and Goals       Nutrition Diagnosis 1 Predicted Inadequate Energy Intake r/t chemotherapy as evidenced by reported decreased PO intake per daughter      Nutrition Diagnosis 2         Goal(s) Initiate EN , Establish EN Tolerance , Tolerates EN , Tolerates EN at Goal , and Meets Estimated Needs      Nutrition Intervention and Prescription       Intervention  Start EN      Diet Prescription     Supplement Prescription     Education Provided     (  Enteral Prescription Peptamen 1.5 at 55 mL/hr + 120mL/hr water flush      Calories  1815 kcals (%)   Protein  82 g (%)   Free water  934 mL   Flushes  720     The above end goal rate is for 22 hrs/day to assume interruptions for ADLs   Water flushes adjusted based on clinical picture + Rx flushes/IV fluids         TPN Prescription       Monitoring/Evaluation       Monitor/Evaluation Per Protocol, PO Intake, Pertinent Labs, EN Delivery/Tolerance, Weight, Skin Status, GI Status, Symptoms, POC/GOC, Swallow Function, and Hemodynamic Stability      RD Follow-Up Encounter 1-2 days     Electronically signed by:  Chinedu Goldstein RD  07/12/25 12:23 EDT

## 2025-07-12 NOTE — PLAN OF CARE
Goal Outcome Evaluation:  Plan of Care Reviewed With: patient        Progress: no change     Patient in bed at this time resting. PRN pain meds given with some relief. Daughter at bedside. Feeding through gtube started today. Ultrasound of heart done today. NO distress to note at this time.

## 2025-07-13 LAB
ALBUMIN SERPL-MCNC: 2.2 G/DL (ref 3.5–5.2)
ALBUMIN/GLOB SERPL: 0.7 G/DL
ALP SERPL-CCNC: 92 U/L (ref 39–117)
ALT SERPL W P-5'-P-CCNC: 7 U/L (ref 1–33)
ANION GAP SERPL CALCULATED.3IONS-SCNC: 8.8 MMOL/L (ref 5–15)
AST SERPL-CCNC: 8 U/L (ref 1–32)
BACTERIA SPEC AEROBE CULT: ABNORMAL
BACTERIA SPEC AEROBE CULT: ABNORMAL
BACTERIA SPEC AEROBE CULT: NO GROWTH
BACTERIA SPEC AEROBE CULT: NORMAL
BASOPHILS # BLD AUTO: 0.01 10*3/MM3 (ref 0–0.2)
BASOPHILS NFR BLD AUTO: 0.2 % (ref 0–1.5)
BH CV ECHO MEAS - AO ROOT DIAM: 1.9 CM
BH CV ECHO MEAS - EDV(CUBED): 59.3 ML
BH CV ECHO MEAS - EDV(MOD-SP4): 59.3 ML
BH CV ECHO MEAS - EF(MOD-SP4): 67.3 %
BH CV ECHO MEAS - ESV(CUBED): 17.6 ML
BH CV ECHO MEAS - ESV(MOD-SP4): 19.4 ML
BH CV ECHO MEAS - FS: 33.3 %
BH CV ECHO MEAS - IVS/LVPW: 1.08 CM
BH CV ECHO MEAS - IVSD: 1.3 CM
BH CV ECHO MEAS - LA DIMENSION: 4.1 CM
BH CV ECHO MEAS - LV DIASTOLIC VOL/BSA (35-75): 40 CM2
BH CV ECHO MEAS - LV MASS(C)D: 169.4 GRAMS
BH CV ECHO MEAS - LV MAX PG: 3.1 MMHG
BH CV ECHO MEAS - LV MEAN PG: 2 MMHG
BH CV ECHO MEAS - LV SYSTOLIC VOL/BSA (12-30): 13.1 CM2
BH CV ECHO MEAS - LV V1 MAX: 88.7 CM/SEC
BH CV ECHO MEAS - LV V1 VTI: 14.5 CM
BH CV ECHO MEAS - LVIDD: 3.9 CM
BH CV ECHO MEAS - LVIDS: 2.6 CM
BH CV ECHO MEAS - LVOT AREA: 3.1 CM2
BH CV ECHO MEAS - LVOT DIAM: 2 CM
BH CV ECHO MEAS - LVPWD: 1.2 CM
BH CV ECHO MEAS - MV A MAX VEL: 179 CM/SEC
BH CV ECHO MEAS - MV DEC TIME: 0.18 SEC
BH CV ECHO MEAS - MV E MAX VEL: 139 CM/SEC
BH CV ECHO MEAS - MV E/A: 0.78
BH CV ECHO MEAS - MV MEAN PG: 9 MMHG
BH CV ECHO MEAS - MV V2 VTI: 51.4 CM
BH CV ECHO MEAS - MVA(VTI): 0.89 CM2
BH CV ECHO MEAS - SV(LVOT): 45.6 ML
BH CV ECHO MEAS - SV(MOD-SP4): 39.9 ML
BH CV ECHO MEAS - SVI(LVOT): 30.7 ML/M2
BH CV ECHO MEAS - SVI(MOD-SP4): 26.9 ML/M2
BH CV ECHO MEAS - TR MAX PG: 36.2 MMHG
BH CV ECHO MEAS - TR MAX VEL: 300.9 CM/SEC
BILIRUB SERPL-MCNC: 0.2 MG/DL (ref 0–1.2)
BUN SERPL-MCNC: 8.2 MG/DL (ref 6–20)
BUN/CREAT SERPL: 43.2 (ref 7–25)
CALCIUM SPEC-SCNC: 7.8 MG/DL (ref 8.6–10.5)
CHLORIDE SERPL-SCNC: 90 MMOL/L (ref 98–107)
CO2 SERPL-SCNC: 28.2 MMOL/L (ref 22–29)
CREAT SERPL-MCNC: 0.19 MG/DL (ref 0.57–1)
DEPRECATED RDW RBC AUTO: 46.6 FL (ref 37–54)
DEVICE COMMENT: ABNORMAL
EGFRCR SERPLBLD CKD-EPI 2021: 136.6 ML/MIN/1.73
EOSINOPHIL # BLD AUTO: 0 10*3/MM3 (ref 0–0.4)
EOSINOPHIL NFR BLD AUTO: 0 % (ref 0.3–6.2)
ERYTHROCYTE [DISTWIDTH] IN BLOOD BY AUTOMATED COUNT: 16.3 % (ref 12.3–15.4)
GLOBULIN UR ELPH-MCNC: 3 GM/DL
GLUCOSE BLDC GLUCOMTR-MCNC: 239 MG/DL (ref 70–99)
GLUCOSE BLDC GLUCOMTR-MCNC: 336 MG/DL (ref 70–99)
GLUCOSE BLDC GLUCOMTR-MCNC: 409 MG/DL (ref 70–99)
GLUCOSE SERPL-MCNC: 323 MG/DL (ref 65–99)
GRAM STN SPEC: ABNORMAL
GRAM STN SPEC: ABNORMAL
GRAM STN SPEC: NORMAL
HCT VFR BLD AUTO: 34.8 % (ref 34–46.6)
HGB BLD-MCNC: 10.6 G/DL (ref 12–15.9)
IMM GRANULOCYTES # BLD AUTO: 0.04 10*3/MM3 (ref 0–0.05)
IMM GRANULOCYTES NFR BLD AUTO: 0.8 % (ref 0–0.5)
IVRT: 77 MS
LYMPHOCYTES # BLD AUTO: 0.18 10*3/MM3 (ref 0.7–3.1)
LYMPHOCYTES NFR BLD AUTO: 3.7 % (ref 19.6–45.3)
MCH RBC QN AUTO: 24.1 PG (ref 26.6–33)
MCHC RBC AUTO-ENTMCNC: 30.5 G/DL (ref 31.5–35.7)
MCV RBC AUTO: 79.1 FL (ref 79–97)
MONOCYTES # BLD AUTO: 0.19 10*3/MM3 (ref 0.1–0.9)
MONOCYTES NFR BLD AUTO: 3.9 % (ref 5–12)
NEUTROPHILS NFR BLD AUTO: 4.49 10*3/MM3 (ref 1.7–7)
NEUTROPHILS NFR BLD AUTO: 91.4 % (ref 42.7–76)
NRBC BLD AUTO-RTO: 0 /100 WBC (ref 0–0.2)
PLATELET # BLD AUTO: 130 10*3/MM3 (ref 140–450)
PMV BLD AUTO: 10.7 FL (ref 6–12)
POTASSIUM SERPL-SCNC: 4.2 MMOL/L (ref 3.5–5.2)
PROT SERPL-MCNC: 5.2 G/DL (ref 6–8.5)
RBC # BLD AUTO: 4.4 10*6/MM3 (ref 3.77–5.28)
SODIUM SERPL-SCNC: 127 MMOL/L (ref 136–145)
WBC NRBC COR # BLD AUTO: 4.91 10*3/MM3 (ref 3.4–10.8)

## 2025-07-13 PROCEDURE — 80053 COMPREHEN METABOLIC PANEL: CPT | Performed by: INTERNAL MEDICINE

## 2025-07-13 PROCEDURE — 87481 CANDIDA DNA AMP PROBE: CPT | Performed by: INTERNAL MEDICINE

## 2025-07-13 PROCEDURE — 82948 REAGENT STRIP/BLOOD GLUCOSE: CPT

## 2025-07-13 PROCEDURE — 25010000002 FUROSEMIDE PER 20 MG: Performed by: INTERNAL MEDICINE

## 2025-07-13 PROCEDURE — 82948 REAGENT STRIP/BLOOD GLUCOSE: CPT | Performed by: INTERNAL MEDICINE

## 2025-07-13 PROCEDURE — 63710000001 PREDNISONE PER 5 MG: Performed by: INTERNAL MEDICINE

## 2025-07-13 PROCEDURE — G0378 HOSPITAL OBSERVATION PER HR: HCPCS

## 2025-07-13 PROCEDURE — 63710000001 INSULIN REGULAR HUMAN PER 5 UNITS: Performed by: INTERNAL MEDICINE

## 2025-07-13 PROCEDURE — 96367 TX/PROPH/DG ADDL SEQ IV INF: CPT

## 2025-07-13 PROCEDURE — 25010000002 ONDANSETRON PER 1 MG: Performed by: INTERNAL MEDICINE

## 2025-07-13 PROCEDURE — 85025 COMPLETE CBC W/AUTO DIFF WBC: CPT | Performed by: INTERNAL MEDICINE

## 2025-07-13 PROCEDURE — 25010000002 CEFTRIAXONE PER 250 MG: Performed by: INTERNAL MEDICINE

## 2025-07-13 PROCEDURE — 99214 OFFICE O/P EST MOD 30 MIN: CPT | Performed by: INTERNAL MEDICINE

## 2025-07-13 PROCEDURE — 96376 TX/PRO/DX INJ SAME DRUG ADON: CPT

## 2025-07-13 RX ORDER — DEXTROSE MONOHYDRATE 25 G/50ML
25 INJECTION, SOLUTION INTRAVENOUS
Status: DISCONTINUED | OUTPATIENT
Start: 2025-07-13 | End: 2025-07-15 | Stop reason: HOSPADM

## 2025-07-13 RX ORDER — OXYCODONE HYDROCHLORIDE 5 MG/1
10 TABLET ORAL EVERY 6 HOURS PRN
Refills: 0 | Status: DISCONTINUED | OUTPATIENT
Start: 2025-07-13 | End: 2025-07-15 | Stop reason: HOSPADM

## 2025-07-13 RX ORDER — IBUPROFEN 600 MG/1
1 TABLET ORAL
Status: DISCONTINUED | OUTPATIENT
Start: 2025-07-13 | End: 2025-07-15 | Stop reason: HOSPADM

## 2025-07-13 RX ORDER — NICOTINE POLACRILEX 4 MG
15 LOZENGE BUCCAL
Status: DISCONTINUED | OUTPATIENT
Start: 2025-07-13 | End: 2025-07-15 | Stop reason: HOSPADM

## 2025-07-13 RX ADMIN — METOPROLOL SUCCINATE 25 MG: 25 TABLET, EXTENDED RELEASE ORAL at 08:19

## 2025-07-13 RX ADMIN — HYDROCODONE BITARTRATE AND ACETAMINOPHEN 1 TABLET: 10; 325 TABLET ORAL at 10:46

## 2025-07-13 RX ADMIN — Medication 5 MG: at 21:09

## 2025-07-13 RX ADMIN — NYSTATIN 500000 UNITS: 100000 SUSPENSION ORAL at 21:09

## 2025-07-13 RX ADMIN — ALPRAZOLAM 0.25 MG: 0.25 TABLET ORAL at 21:09

## 2025-07-13 RX ADMIN — ACETAMINOPHEN 1000 MG: 500 TABLET ORAL at 08:20

## 2025-07-13 RX ADMIN — FUROSEMIDE 40 MG: 10 INJECTION, SOLUTION INTRAMUSCULAR; INTRAVENOUS at 08:19

## 2025-07-13 RX ADMIN — NYSTATIN 1 APPLICATION: 100000 OINTMENT TOPICAL at 21:35

## 2025-07-13 RX ADMIN — OXYCODONE HYDROCHLORIDE 10 MG: 5 TABLET ORAL at 19:26

## 2025-07-13 RX ADMIN — HYDROCODONE BITARTRATE AND ACETAMINOPHEN 1 TABLET: 10; 325 TABLET ORAL at 06:22

## 2025-07-13 RX ADMIN — NYSTATIN 500000 UNITS: 100000 SUSPENSION ORAL at 17:01

## 2025-07-13 RX ADMIN — PREDNISONE 15 MG: 10 TABLET ORAL at 08:21

## 2025-07-13 RX ADMIN — ONDANSETRON 4 MG: 2 INJECTION, SOLUTION INTRAMUSCULAR; INTRAVENOUS at 16:48

## 2025-07-13 RX ADMIN — DIPHENHYDRAMINE HYDROCHLORIDE AND LIDOCAINE HYDROCHLORIDE AND ALUMINUM HYDROXIDE AND MAGNESIUM HYDRO 5 ML: KIT at 08:31

## 2025-07-13 RX ADMIN — PREDNISONE 15 MG: 10 TABLET ORAL at 21:09

## 2025-07-13 RX ADMIN — PAROXETINE 20 MG: 20 TABLET, FILM COATED ORAL at 06:23

## 2025-07-13 RX ADMIN — HYDROCODONE BITARTRATE AND ACETAMINOPHEN 1 TABLET: 10; 325 TABLET ORAL at 14:55

## 2025-07-13 RX ADMIN — HYDROCODONE BITARTRATE AND ACETAMINOPHEN 1 TABLET: 10; 325 TABLET ORAL at 21:10

## 2025-07-13 RX ADMIN — NYSTATIN 500000 UNITS: 100000 SUSPENSION ORAL at 08:19

## 2025-07-13 RX ADMIN — Medication 10 ML: at 08:22

## 2025-07-13 RX ADMIN — ALPRAZOLAM 0.25 MG: 0.25 TABLET ORAL at 08:20

## 2025-07-13 RX ADMIN — FAMOTIDINE 40 MG: 20 TABLET, FILM COATED ORAL at 16:48

## 2025-07-13 RX ADMIN — HYDROCODONE BITARTRATE AND ACETAMINOPHEN 1 TABLET: 10; 325 TABLET ORAL at 02:48

## 2025-07-13 RX ADMIN — CEFTRIAXONE SODIUM 1000 MG: 1 INJECTION, POWDER, FOR SOLUTION INTRAMUSCULAR; INTRAVENOUS at 11:26

## 2025-07-13 RX ADMIN — Medication 10 ML: at 21:26

## 2025-07-13 RX ADMIN — LOPERAMIDE HYDROCHLORIDE 2 MG: 2 CAPSULE ORAL at 17:30

## 2025-07-13 RX ADMIN — HUMAN INSULIN 5 UNITS: 100 INJECTION, SOLUTION SUBCUTANEOUS at 18:23

## 2025-07-13 RX ADMIN — NYSTATIN 1 APPLICATION: 100000 OINTMENT TOPICAL at 10:48

## 2025-07-13 RX ADMIN — HUMAN INSULIN 7 UNITS: 100 INJECTION, SOLUTION SUBCUTANEOUS at 12:35

## 2025-07-13 RX ADMIN — TRIAMCINOLONE ACETONIDE 1 APPLICATION: 1 CREAM TOPICAL at 08:23

## 2025-07-13 NOTE — PLAN OF CARE
Goal Outcome Evaluation:           Progress: declining  Outcome Evaluation: Pt A&Ox4, refusing some of care throughout shift, including part of wound care on her R heel and foot, and vital signs. Pt restless and aggravated this shift. Dr. Ortega spoke to pt at bedside about hospice, pt later expressed intrest in going this route, pallative consult put in. Pt requests pain meds frequently, given PRN per order. PEG tube feeds continued. Wound care to legs and PEG tube complete. Ongoing care.

## 2025-07-13 NOTE — PLAN OF CARE
Goal Outcome Evaluation:  Plan of Care Reviewed With: patient        Progress: no change  Outcome Evaluation: No changes this shift. continue to require frequent pain meds. tolerating Gtube feeds. plan of care ongoing.

## 2025-07-13 NOTE — PROGRESS NOTES
Date of service: 7/13/2025    Subjective: Hurting all over.  Was given her analgesic medication this morning.  No chest pain, SOB, palpitations or dizziness.    Physical examination: Cachectic.    Vital signs: Reviewed  HEENT: Sclerae: Pale but not icteric.    Neck: No JVD or carotid bruit.  Chest: CTA.  No wheezes or rales.  Cardiac: RRR.  2-3/6 holosystolic murmur over the apex and left sternal border.  Abdomen: Soft and nontender.  Extremities: Wrapped in gauze.  But no edema, cyanosis or clubbing..  Pulses intact.  Neuro: Alert, oriented no facial droop.  Speech: Hoarseness    Records: Reviewed    Assessment and plan:    1.  Acute diastolic CHF, mostly due to valvular heart disease (as stated above.  2.  Severe aortic stenosis, status post TAVR  3.  Mitral stenosis and severe mitral regurgitation.  4.  Echocardiography to reassess the severity of valvular heart disease  5.  Gentle IV diuresis.  Decrease furosemide to 40 mg IV daily.  6.  Other medical problems as previously outlined.   7.  Discussed with the echo results.  There were no significant changes from the previous one that was done in March 2025.    8.  Once again, no invasive cardiac work will be done due to her multiple comorbid medical issues. Therefore, she will be treated medically.     9.  I discussed the above with Dr. Mancuso.  Will follow-up periodically.

## 2025-07-13 NOTE — PROGRESS NOTES
[facility]  Inpatient Progress Note    Reason for Follow Up: G-tube evaluation    Subjective     Interval History:   CT scan showed no evidence of abscess at the G-tube site  Tube feeds restarted yesterday and tolerating well  Minimal drainage seen at the G-tube site with some skin irritation overall overall appears well-functioning    Current Facility-Administered Medications:     acetaminophen (TYLENOL) tablet 1,000 mg, 1,000 mg, Oral, Daily, Teodoro Mancuso MD, 1,000 mg at 07/13/25 0820    ALPRAZolam (XANAX) tablet 0.25 mg, 0.25 mg, Oral, BID, Teodoro Mancuso MD, 0.25 mg at 07/13/25 0820    aluminum-magnesium hydroxide-simethicone (MAALOX MAX) 400-400-40 MG/5ML suspension 15 mL, 15 mL, Oral, Q6H PRN, Teodoro Mancuso MD    sennosides-docusate (PERICOLACE) 8.6-50 MG per tablet 2 tablet, 2 tablet, Oral, BID, 2 tablet at 07/12/25 0822 **AND** polyethylene glycol (MIRALAX) packet 17 g, 17 g, Oral, Daily PRN **AND** bisacodyl (DULCOLAX) EC tablet 5 mg, 5 mg, Oral, Daily PRN **AND** bisacodyl (DULCOLAX) suppository 10 mg, 10 mg, Rectal, Daily PRN, Teodoro Mancuso MD    budesonide (PULMICORT) nebulizer solution 0.5 mg, 0.5 mg, Nebulization, BID - RT, Teodoro Mancuso MD, 0.5 mg at 07/11/25 1044    cefTRIAXone (ROCEPHIN) 1,000 mg in sodium chloride 0.9 % 100 mL IVPB-VTB, 1,000 mg, Intravenous, Q24H, Teodoro Mancuso MD, Last Rate: 200 mL/hr at 07/13/25 1126, 1,000 mg at 07/13/25 1126    famotidine (PEPCID) tablet 40 mg, 40 mg, Oral, BID AC, Teodoro Mancuso MD, 40 mg at 07/12/25 1821    First Mouthwash (Magic Mouthwash) 5 mL, 5 mL, Swish & Swallow, Q6H, Teodoro Mancuso MD, 5 mL at 07/13/25 0831    furosemide (LASIX) injection 40 mg, 40 mg, Intravenous, BID Diuretics, Teodoro Mancuso MD, 40 mg at 07/13/25 0819    HYDROcodone-acetaminophen (NORCO)  MG per tablet 1 tablet, 1 tablet, Oral, Q4H PRN, Teodoro Mancuso MD, 1 tablet at 07/13/25 1046    ipratropium-albuterol (DUO-NEB) nebulizer  solution 3 mL, 3 mL, Nebulization, 4x Daily PRN, Teodoro Mancuso MD    loperamide (IMODIUM) capsule 2 mg, 2 mg, Oral, 4x Daily PRN, Teodoro Mancuso MD, 2 mg at 07/12/25 2052    melatonin tablet 5 mg, 5 mg, Oral, Nightly, Teodoro Mancuso MD, 5 mg at 07/11/25 2122    metoprolol succinate XL (TOPROL-XL) 24 hr tablet 25 mg, 25 mg, Oral, Q12H, Teodoro Mancuso MD, 25 mg at 07/13/25 0819    nystatin (MYCOSTATIN) 100,000 unit/mL suspension 500,000 Units, 5 mL, Oral, 4x Daily, Teodoro Mancuso MD, 500,000 Units at 07/13/25 0819    nystatin (MYCOSTATIN) ointment 1 Application, 1 Application, Topical, 3 times per day, Teodoro Mancuso MD, 1 Application at 07/13/25 1048    ondansetron (ZOFRAN) injection 4 mg, 4 mg, Intravenous, Q6H PRN, Teodoro Mancuso MD    ondansetron ODT (ZOFRAN-ODT) disintegrating tablet 8 mg, 8 mg, Sublingual, Q8H PRN, Teodoro Mancuso MD, 8 mg at 07/12/25 2052    PARoxetine (PAXIL) tablet 20 mg, 20 mg, Oral, QAM, Teodoro Mancuso MD, 20 mg at 07/13/25 0623    Patient Supplied Pain Pump, , Intrathecal, Continuous, Teodoro Mancuso MD    predniSONE (DELTASONE) tablet 15 mg, 15 mg, Oral, BID, Teodoro Mancuso MD, 15 mg at 07/13/25 0821    promethazine (PHENERGAN) suppository 25 mg, 25 mg, Rectal, Q8H PRN, Teodoro Mancuso MD    sodium chloride 0.9 % flush 10 mL, 10 mL, Intravenous, PRN, Teodoro Mancuso MD    sodium chloride 0.9 % flush 10 mL, 10 mL, Intravenous, Q12H, Teodoro Mancuso MD, 10 mL at 07/13/25 0822    sodium chloride 0.9 % flush 10 mL, 10 mL, Intravenous, PRN, Teodoro Mancuso MD    sodium chloride 0.9 % infusion 40 mL, 40 mL, Intravenous, PRN, Teodoro Mancuso MD    triamcinolone (KENALOG) 0.1 % cream 1 Application, 1 Application, Topical, BID, Teodoro Mancuso MD, 1 Application at 07/13/25 0823  Review of Systems:    All systems were reviewed and negative except for that previously mentioned in the HPI    Objective     Vital Signs  Temp:  [97 °F (36.1 °C)-97.7 °F (36.5 °C)] 97.7 °F (36.5  °C)  Heart Rate:  [] 104  Resp:  [16-20] 18  BP: (81-99)/(63-71) 99/71  Body mass index is 19.1 kg/m².    Intake/Output Summary (Last 24 hours) at 7/13/2025 1129  Last data filed at 7/13/2025 0640  Gross per 24 hour   Intake 10 ml   Output 500 ml   Net -490 ml     No intake/output data recorded.     Physical Exam:   General: patient awake, alert and cooperative   Eyes: Normal lids and lashes, no scleral icterus   Neck: supple, normal ROM   Skin: warm and dry, not jaundiced   Cardiovascular: regular rhythm and rate, no murmurs auscultated   Pulm: clear to auscultation bilaterally, regular and unlabored   Abdomen: soft, nontender, nondistended; normal bowel sounds   Rectal: deferred   Extremities: no rash or edema   Psychiatric: Normal mood and behavior; memory intact     Results Review:     I reviewed the patient's new clinical results.    Results from last 7 days   Lab Units 07/13/25  0514 07/12/25  0516 07/11/25  0510   WBC 10*3/mm3 4.91 6.82 10.93*   HEMOGLOBIN g/dL 10.6* 10.4* 10.5*   HEMATOCRIT % 34.8 35.7 34.5   PLATELETS 10*3/mm3 130* 129* 149     Results from last 7 days   Lab Units 07/13/25  0514 07/12/25  0516 07/11/25  0510   SODIUM mmol/L 127* 130* 131*   POTASSIUM mmol/L 4.2 4.5 4.3   CHLORIDE mmol/L 90* 94* 94*   CO2 mmol/L 28.2 21.6* 20.1*   BUN mg/dL 8.2 6.3 10.1   CREATININE mg/dL 0.19* 0.17* 0.21*   CALCIUM mg/dL 7.8* 8.1* 8.1*   BILIRUBIN mg/dL 0.2 0.2 0.2   ALK PHOS U/L 92 100 120*   ALT (SGPT) U/L 7 6 7   AST (SGOT) U/L 8 9 8   GLUCOSE mg/dL 323* 198* 164*     Results from last 7 days   Lab Units 07/11/25  0510   INR  1.16*     Lab Results   Lab Value Date/Time    LIPASE 27 07/02/2025 1334    LIPASE 66 (H) 06/05/2025 1106    LIPASE 48 05/06/2025 1921    LIPASE 41 03/17/2025 2209    LIPASE 22 07/20/2024 0822    LIPASE 81 (H) 05/29/2024 2309    LIPASE 18 04/27/2024 1043    LIPASE 39 03/07/2024 1331    LIPASE 10 (L) 02/06/2024 0241    LIPASE 8 (L) 05/26/2023 1200    LIPASE 17 11/27/2022 0456     LIPASE 17 09/05/2022 2046    LIPASE 24 05/08/2022 2159    LIPASE 23 01/21/2022 1304    LIPASE 21 01/05/2019 1409       Radiology:  CT Abdomen Pelvis Without Contrast  Result Date: 7/11/2025  1.No definite intraperitoneal or retroperitoneal abscess is seen by nonenhanced CT. 2.An age-indeterminate uncomplicated infectious/inflammatory pan-colitis is suspected. 3.New or increased lymphadenopathy is seen in the abdomen and pelvis. 4.There are new small-to-moderate bilateral pleural effusions. 5.New anasarca is suspected. 6.Please see the above comments for further detail. Portions of this note were completed with a voice recognition program. Electronically Signed: Sony Hood MD  7/11/2025 11:29 PM EDT  Workstation ID: [id number]          Assessment:       Dehydration  Poor oral intake  Indwelling G-tube    Plan:   I discussed with the patient and her nurse that her current G-tube is not infected and simply has some skin irritation beneath the outer bumper.  I do not see any indication to change the tube as it is currently functioning well and in good location per the CT scan.  She is tolerating tube feeds at rate of 40 mL an hour .  I will continue wound care and appreciate their recommendations.    If the patient wishes the tube to be shorter then it can simply be cut with the outer attachment reattached to a shorter tube.  This was discussed with the nurse.    I discussed the patients findings and my recommendations with patient.    Rodger Ortega M.D.  Gastroenterology

## 2025-07-13 NOTE — PROGRESS NOTES
[facility]     Progress Note    Patient Name: Isha Llanes  : 1965  MRN: [id number]  Primary Care Physician:  Provider, No Known  Date of admission: 7/10/2025    Subjective   Subjective     Chief Complaint: Labile and doing well complaining of weakness of the legs she does have neuropathy which is chronic and probably worse with chemotherapy patient has history of cardiac issues however no active treatment recommended at this time    HPI: Patient with anaplastic large cell lymphoma status postchemotherapy    Review of Systems   All systems were reviewed and negative except for: Has been reviewed    Objective   Objective     Vitals:   Temp:  [97 °F (36.1 °C)-97.7 °F (36.5 °C)] 97.7 °F (36.5 °C)  Heart Rate:  [] 104  Resp:  [16-20] 18  BP: (81-99)/(63-71) 99/71  Flow (L/min) (Oxygen Therapy):  [1-2] 2    Physical Exam    Constitutional: Alert and oriented not in acute distress   Eyes: Pupils equal reacting to light and accommodate   HENT: Normocephalic   Neck: No lymphadenopathy   Respiratory: No rales or rhonchi   Cardiovascular: S1-S2 normal no S3 or S4   Gastrointestinal: No palpable organomegaly   Musculoskeletal: No deformities   Neurologic: No localizing sign  Skin: No rash       Result Review    Result Review:  I have personally reviewed the results from the time of this admission to 2025 09:53 EDT and agree with these findings:  [x]  Laboratory  []  Microbiology  []  Radiology  []  EKG/Telemetry   []  Cardiology/Vascular   []  Pathology  []  Old records  []  Other:  Most notable findings include: Have been reviewed and discussed    Assessment & Plan   Assessment / Plan     Brief Patient Summary:  Isha Llanes is a 59 y.o. female who patient stable and doing well not in acute distress but admitted with abdominal pain she is not complaining of abdominal pain now she says she has pain all over    Active Hospital Problems:  Active Hospital Problems    Diagnosis      **Dehydration        Plan:   Patient states she cannot walk but she has always been wheelchair-bound for the last several years    VTE Prophylaxis:  No VTE prophylaxis order currently exists.        CODE STATUS:   Code Status (Patient has no pulse and is not breathing): CPR (Attempt to Resuscitate)  Medical Interventions (Patient has pulse or is breathing): Full Support  Level Of Support Discussed With: Patient    Disposition:  I expect patient to be discharged after the patient has been stable.    Electronically signed by Teodoro Mancuso MD, 07/13/25, 9:53 AM EDT.      Part of this note may be an electronic transcription/translation of spoken language to printed text using the Dragon Dictation System.

## 2025-07-14 LAB
GLUCOSE BLDC GLUCOMTR-MCNC: 345 MG/DL (ref 70–99)
GLUCOSE BLDC GLUCOMTR-MCNC: 368 MG/DL (ref 70–99)
GLUCOSE BLDC GLUCOMTR-MCNC: 368 MG/DL (ref 70–99)
GLUCOSE BLDC GLUCOMTR-MCNC: 420 MG/DL (ref 70–99)
Lab: ABNORMAL

## 2025-07-14 PROCEDURE — 63710000001 PREDNISONE PER 5 MG: Performed by: INTERNAL MEDICINE

## 2025-07-14 PROCEDURE — 96376 TX/PRO/DX INJ SAME DRUG ADON: CPT

## 2025-07-14 PROCEDURE — 25010000002 FUROSEMIDE PER 20 MG: Performed by: INTERNAL MEDICINE

## 2025-07-14 PROCEDURE — G0378 HOSPITAL OBSERVATION PER HR: HCPCS

## 2025-07-14 PROCEDURE — 63710000001 INSULIN REGULAR HUMAN PER 5 UNITS: Performed by: INTERNAL MEDICINE

## 2025-07-14 PROCEDURE — 82948 REAGENT STRIP/BLOOD GLUCOSE: CPT

## 2025-07-14 RX ORDER — MORPHINE SULFATE 15 MG/1
15 TABLET, FILM COATED, EXTENDED RELEASE ORAL EVERY 12 HOURS SCHEDULED
Refills: 0 | Status: DISCONTINUED | OUTPATIENT
Start: 2025-07-14 | End: 2025-07-15 | Stop reason: HOSPADM

## 2025-07-14 RX ORDER — MORPHINE SULFATE 15 MG/1
15 TABLET, FILM COATED, EXTENDED RELEASE ORAL ONCE
Refills: 0 | Status: COMPLETED | OUTPATIENT
Start: 2025-07-14 | End: 2025-07-14

## 2025-07-14 RX ADMIN — HYDROCODONE BITARTRATE AND ACETAMINOPHEN 1 TABLET: 10; 325 TABLET ORAL at 12:57

## 2025-07-14 RX ADMIN — ALPRAZOLAM 0.25 MG: 0.25 TABLET ORAL at 10:00

## 2025-07-14 RX ADMIN — FAMOTIDINE 40 MG: 20 TABLET, FILM COATED ORAL at 17:22

## 2025-07-14 RX ADMIN — HUMAN INSULIN 7 UNITS: 100 INJECTION, SOLUTION SUBCUTANEOUS at 14:15

## 2025-07-14 RX ADMIN — PREDNISONE 15 MG: 10 TABLET ORAL at 10:00

## 2025-07-14 RX ADMIN — HYDROCODONE BITARTRATE AND ACETAMINOPHEN 1 TABLET: 10; 325 TABLET ORAL at 05:23

## 2025-07-14 RX ADMIN — ACETAMINOPHEN 1000 MG: 500 TABLET ORAL at 10:00

## 2025-07-14 RX ADMIN — ALPRAZOLAM 0.25 MG: 0.25 TABLET ORAL at 20:27

## 2025-07-14 RX ADMIN — HUMAN INSULIN 6 UNITS: 100 INJECTION, SOLUTION SUBCUTANEOUS at 17:28

## 2025-07-14 RX ADMIN — OXYCODONE HYDROCHLORIDE 10 MG: 5 TABLET ORAL at 01:28

## 2025-07-14 RX ADMIN — FUROSEMIDE 40 MG: 10 INJECTION, SOLUTION INTRAMUSCULAR; INTRAVENOUS at 10:01

## 2025-07-14 RX ADMIN — OXYCODONE HYDROCHLORIDE 10 MG: 5 TABLET ORAL at 10:00

## 2025-07-14 RX ADMIN — OXYCODONE HYDROCHLORIDE 10 MG: 5 TABLET ORAL at 15:36

## 2025-07-14 RX ADMIN — HUMAN INSULIN 5 UNITS: 100 INJECTION, SOLUTION SUBCUTANEOUS at 23:20

## 2025-07-14 RX ADMIN — OXYCODONE HYDROCHLORIDE 10 MG: 5 TABLET ORAL at 23:20

## 2025-07-14 RX ADMIN — Medication 5 MG: at 20:27

## 2025-07-14 RX ADMIN — TRIAMCINOLONE ACETONIDE 1 APPLICATION: 1 CREAM TOPICAL at 10:03

## 2025-07-14 RX ADMIN — NYSTATIN 500000 UNITS: 100000 SUSPENSION ORAL at 12:58

## 2025-07-14 RX ADMIN — PREDNISONE 15 MG: 10 TABLET ORAL at 20:27

## 2025-07-14 RX ADMIN — DOCUSATE SODIUM 50 MG AND SENNOSIDES 8.6 MG 2 TABLET: 8.6; 5 TABLET, FILM COATED ORAL at 10:00

## 2025-07-14 RX ADMIN — FAMOTIDINE 40 MG: 20 TABLET, FILM COATED ORAL at 10:00

## 2025-07-14 RX ADMIN — NYSTATIN 1 APPLICATION: 100000 OINTMENT TOPICAL at 10:03

## 2025-07-14 RX ADMIN — PAROXETINE 20 MG: 20 TABLET, FILM COATED ORAL at 10:04

## 2025-07-14 RX ADMIN — HUMAN INSULIN 6 UNITS: 100 INJECTION, SOLUTION SUBCUTANEOUS at 05:33

## 2025-07-14 RX ADMIN — MORPHINE SULFATE 15 MG: 15 TABLET, FILM COATED, EXTENDED RELEASE ORAL at 14:07

## 2025-07-14 RX ADMIN — NYSTATIN 500000 UNITS: 100000 SUSPENSION ORAL at 10:00

## 2025-07-14 RX ADMIN — MORPHINE SULFATE 15 MG: 15 TABLET, FILM COATED, EXTENDED RELEASE ORAL at 17:21

## 2025-07-14 RX ADMIN — Medication 10 ML: at 10:07

## 2025-07-14 RX ADMIN — LOPERAMIDE HYDROCHLORIDE 2 MG: 2 CAPSULE ORAL at 02:00

## 2025-07-14 RX ADMIN — MORPHINE SULFATE 15 MG: 15 TABLET, FILM COATED, EXTENDED RELEASE ORAL at 20:27

## 2025-07-14 RX ADMIN — HUMAN INSULIN 3 UNITS: 100 INJECTION, SOLUTION SUBCUTANEOUS at 00:11

## 2025-07-14 NOTE — PLAN OF CARE
Goal Outcome Evaluation:  Plan of Care Reviewed With: patient, child        Progress: no change                Patient in room at this time, with daughter at bedside. C/O of pain this morning and Pain meds given. IV removed this morning because patient stated that it was hurting to bad. Dressing to legs removed as well because patient did not want those on either. Education about infection but did not agree. Stated just take them off. Patient stated they want to go home with palliative or hospice at this time and just wants to be comfortable and is tired. Discharge went over with daughter.

## 2025-07-14 NOTE — CONSULTS
"Purpose of the visit was to evaluate for: hospice referral/discussion and pain/symptom management. Spoke with RN, patient, family, and CM RN and discussed palliative care and goals of care.      Assessment: Ms. Llanes has a medical history of GERD, Asthma, DM II, and multiple malignancies currently being treated for anaplastic large cell lymphoma presented to the ED for evaluation of weakness, nausea, poor intake with G-tube, and diarrhea. Palliative care is familiar with pt as she has been seen multiple times typically because pain is not being controlled and she verbalizes wanting [facility] to staff. However daughter reminds pt of grandson and pt reverts back to wanting to do what daughter request. Spoke with pt who was tearful and crying out in pain reporting \"I am done. It is my decision I just want to go home with hospice.\" Discussed that pt would need daughter on board as plans are for dc home with Daughter. Discussed calling daughter who answered and reports \"I have a surprise for her that will lift her spirits.\" Clarified that daughter is bringing 15 y/o grandson to see pt. Discussed meeting once she is here as pt vocalized wishes for [facility] services however daughter verbalized believing that once she sees grandson she will change her mind. Plan is to meet with daughter to discuss with pt wishes and provide education.       Recommendations/Plan: Pending discussion with daughter.    Tasks Completed: Emotional Support.    Chioma STEELE RN  Palliative Care    "

## 2025-07-14 NOTE — CONSULTS
Had family meeting with pt, daughter, and grandson at bedside. Daughter verbalized her own concerns prior to consult. Discussed concerns with pt and pt clarified. She is done with treatment and just wants her pain managed. Clarified and provided education that she understands this is end of life care and that chemotherapy will stop. Pt verbalized understanding and that she just wants to go home and be done. Verbalized multiple times she is tired. Daughter also reported that pt has declined eating over the last several weeks and declined tube feedings as well.  Provided education and emotional support. Have consulted [facility] to establish if they can admit pt tonight before dc home.   Palliative care will continue to follow.      Chioma STEELE RN  Palliative Care

## 2025-07-14 NOTE — PLAN OF CARE
Goal Outcome Evaluation:  Plan of Care Reviewed With: patient, child        Progress: no change          Patient in room at this time resting. Patient is not going home today. Will be going home tomorrow on hospice. PRN pain meds given. Dressings complete. Feeds continue in gtube and tolerating fine.

## 2025-07-14 NOTE — DISCHARGE SUMMARY
[facility]         DISCHARGE SUMMARY    Patient Name: Isha Llanes  : 1965  MRN: [id number]    Date of Admission: 7/10/2025  Date of Discharge: 2025  Primary Care Physician: Provider, No Known    Consults       Date and Time Order Name Status Description    2025 10:59 AM Inpatient Cardiology Consult      2025 10:58 AM Inpatient Gastroenterology Consult Completed     7/10/2025  4:51 PM IP General Consult (Use specialty-specific consult if known)              Presenting Problem:   Dehydration [E86.0]  Hypotension, unspecified hypotension type [I95.9]    Active and Resolved Hospital Problems:  Active Hospital Problems    Diagnosis POA    **Dehydration [E86.0] Yes      Resolved Hospital Problems   No resolved problems to display.         Hospital Course     Hospital Course:  Isha Llanes is a 59 y.o. female patient was admitted to the hospital because of abdominal pain which has now resolved patient had cardiac evaluation no further workup and has been indicated, there was concern about G-tube however it been functioning well have discussed with Dr. Richey who feels it does not need to be changed patient does have neuropathy difficulty walking has been wheelchair-bound for many years back to her baseline and therefore being discharged had nausea, vomiting postchemotherapy which has resolved      DISCHARGE Follow Up Recommendations for labs and diagnostics: History of anaplastic large cell lymphoma postchemotherapy nausea vomiting which has resolved      Pertinent  and/or Most Recent Results     LAB RESULTS:      Lab 25  0514 25  0516 25  0510 07/10/25  1848 07/10/25  1718 07/10/25  1611   WBC 4.91 6.82 10.93* 10.11  --  11.07*   HEMOGLOBIN 10.6* 10.4* 10.5* 10.1*  --  10.8*   HEMATOCRIT 34.8 35.7 34.5 33.2*  --  35.5   PLATELETS 130* 129* 149 114*  --  124*   NEUTROS ABS 4.49 6.31 10.12*  --   --  10.33*   IMMATURE GRANS (ABS) 0.04 0.05 0.13*  --    --  0.10*   LYMPHS ABS 0.18* 0.19* 0.28*  --   --  0.26*   MONOS ABS 0.19 0.26 0.39  --   --  0.37   EOS ABS 0.00 0.00 0.00  --   --  0.00   MCV 79.1 82.8 78.8* 79.4  --  79.1   LACTATE  --   --   --   --  1.1  --    PROTIME  --   --  15.3*  --   --   --          Lab 07/13/25  0514 07/12/25  0516 07/11/25  0510 07/10/25  1848 07/10/25  1611   SODIUM 127* 130* 131* 129* 127*   POTASSIUM 4.2 4.5 4.3 4.6 5.0   CHLORIDE 90* 94* 94* 95* 91*   CO2 28.2 21.6* 20.1* 19.3* 21.1*   ANION GAP 8.8 14.4 16.9* 14.7 14.9   BUN 8.2 6.3 10.1 13.8 16.9   CREATININE 0.19* 0.17* 0.21* 0.19* 0.27*   EGFR 136.6 140.3 133.4 136.6 125.5   GLUCOSE 323* 198* 164* 161* 185*   CALCIUM 7.8* 8.1* 8.1* 7.6* 8.7   MAGNESIUM  --  2.0 1.5*  --  1.6         Lab 07/13/25  0514 07/12/25  0516 07/11/25  0510 07/10/25  1848 07/10/25  1611   TOTAL PROTEIN 5.2* 5.4* 5.6* 5.5* 5.8*   ALBUMIN 2.2* 1.7* 2.0* 1.7* 2.2*   GLOBULIN 3.0 3.7 3.6 3.8 3.6   ALT (SGPT) 7 6 7 7 7   AST (SGOT) 8 9 8 11 7   BILIRUBIN 0.2 0.2 0.2 0.2 0.3   ALK PHOS 92 100 120* 127* 122*         Lab 07/11/25  0510 07/10/25  1848 07/10/25  1611   HSTROP T  --  18* 21*   PROTIME 15.3*  --   --    INR 1.16*  --   --                  Brief Urine Lab Results  (Last result in the past 365 days)        Color   Clarity   Blood   Leuk Est   Nitrite   Protein   CREAT   Urine HCG        07/12/25 0615 Yellow   Clear   Negative   Negative   Negative   30 mg/dL (1+)                 Microbiology Results (last 10 days)       Procedure Component Value - Date/Time    Urine Culture - Urine, Straight Cath [[id number]]  (Normal) Collected: 07/12/25 0615    Lab Status: Final result Specimen: Urine from Straight Cath Updated: 07/13/25 1225     Urine Culture No growth    Wound Culture - Wound, Abdominal Wall [[id number]] Collected: 07/11/25 1634    Lab Status: Final result Specimen: Wound from Abdominal Wall Updated: 07/13/25 0911     Wound Culture Scant growth (1+) Normal Skin Vanna     Gram Stain Occasional  Gram positive cocci in pairs    Wound Culture - Drainage, Leg [[id number]]  (Abnormal) Collected: 07/11/25 1634    Lab Status: Final result Specimen: Drainage from Leg Updated: 07/13/25 0817     Wound Culture Light growth (2+) Normal Skin Vanna      Rare growth Candida albicans     Gram Stain Occasional Gram positive cocci in pairs and clusters      Occasional Yeast    Clostridioides difficile Toxin - Stool, Per Rectum [[id number]] Collected: 07/11/25 0853    Lab Status: Final result Specimen: Stool from Per Rectum Updated: 07/11/25 1010    Narrative:      The following orders were created for panel order Clostridioides difficile Toxin - Stool, Per Rectum.  Procedure                               Abnormality         Status                     ---------                               -----------         ------                     Clostridioides difficile...[[id number]]                      Final result                 Please view results for these tests on the individual orders.    Clostridioides difficile Toxin, PCR - Stool, Per Rectum [[id number]] Collected: 07/11/25 0853    Lab Status: Final result Specimen: Stool from Per Rectum Updated: 07/11/25 1010     Toxigenic C. difficile by PCR Negative     027 Toxin Presumptive Negative    Narrative:      The result indicates the absence of toxigenic C. difficile from stool specimen.             CT Abdomen Pelvis Without Contrast  Result Date: 7/11/2025  Impression: 1.No definite intraperitoneal or retroperitoneal abscess is seen by nonenhanced CT. 2.An age-indeterminate uncomplicated infectious/inflammatory pan-colitis is suspected. 3.New or increased lymphadenopathy is seen in the abdomen and pelvis. 4.There are new small-to-moderate bilateral pleural effusions. 5.New anasarca is suspected. 6.Please see the above comments for further detail. Portions of this note were completed with a voice recognition program. Electronically Signed: Sony Hood MD  7/11/2025 11:29 PM EDT   Workstation ID: [id number]    XR Chest 1 View  Result Date: 7/10/2025  Impression: Impression: No acute process identified. Electronically Signed: Werner Linares MD  7/10/2025 3:13 PM EDT  Workstation ID: [id number]    XR Chest 1 View  Result Date: 7/2/2025  Impression: Impression: 1.No acute cardiopulmonary abnormality is identified. 2.Known cavitary right lower lobe nodule. Electronically Signed: Karine Miranda MD  7/2/2025 12:40 PM EDT  Workstation ID: [id number]    XR Chest 1 View  Result Date: 6/24/2025  Impression: Impression: No radiographic evidence of acute pulmonary process Electronically Signed: Drake Zhao MD  6/24/2025 9:46 AM EDT  Workstation ID: [id number]      Results for orders placed during the hospital encounter of 02/04/25    Doppler Arterial Single Level Upper Extremity - Bilateral CAR 02/04/2025 10:41 AM    Interpretation Summary    Normal arterial pressures on the right. Normal digital pressures noted on the right.    Normal arterial pressures on the left. Normal digital pressures noted on the left.    Normal arterial evaluation of both upper extremities.      Results for orders placed during the hospital encounter of 02/04/25    Doppler Arterial Single Level Upper Extremity - Bilateral CAR 02/04/2025 10:41 AM    Interpretation Summary    Normal arterial pressures on the right. Normal digital pressures noted on the right.    Normal arterial pressures on the left. Normal digital pressures noted on the left.    Normal arterial evaluation of both upper extremities.      Results for orders placed during the hospital encounter of 07/10/25    Adult Transthoracic Echo Complete W/ Cont if Necessary Per Protocol 07/13/2025 10:19 AM    Interpretation Summary    Left ventricular ejection fraction appears to be 61 - 65%.    Left ventricular wall thickness is consistent with mild concentric hypertrophy.    Left ventricular diastolic function is consistent with (grade I) impaired relaxation.    There is a  bioprosthetic aortic valve present.    Estimated right ventricular systolic pressure from tricuspid regurgitation is mildly elevated (35-45 mmHg).      Labs Pending at Discharge:  Pending Labs       Order Current Status    CANDIDA AURIS PCR - Swab, Axilla Right, Axilla Left and Groin In process              Discharge Details        Discharge Medications        Changes to Medications        Instructions Start Date   predniSONE 5 MG tablet  Commonly known as: DELTASONE  What changed:   when to take this  additional instructions   15 mg, Oral, 2 Times Daily, 15mg in am 15mg in pm             Continue These Medications        Instructions Start Date   acetaminophen 500 MG tablet  Commonly known as: TYLENOL   1,000 mg, Daily      ALPRAZolam 0.25 MG tablet  Commonly known as: XANAX   0.25 mg, 2 Times Daily      brentuximab 50 MG chemo injection  Commonly known as: ADCETRIS   50 mg, Once      budesonide 0.5 MG/2ML nebulizer solution  Commonly known as: PULMICORT   0.5 mg, 2 Times Daily - RT      famotidine 40 MG tablet  Commonly known as: Pepcid   40 mg, Oral, Every Night at Bedtime      furosemide 20 MG tablet  Commonly known as: LASIX   20 mg, Daily      HYDROcodone-acetaminophen  MG per tablet  Commonly known as: NORCO   1 tablet, Every 4 Hours PRN      Insulin Lispro (1 Unit Dial) 100 UNIT/ML solution pen-injector  Commonly known as: HUMALOG   Inject 12 Units under the skin into the appropriate area as directed Daily.      ipratropium-albuterol 0.5-2.5 mg/3 ml nebulizer  Commonly known as: DUO-NEB   3 mL, Nebulization, 4 Times Daily PRN      levoFLOXacin 500 MG tablet  Commonly known as: LEVAQUIN   500 mg, Daily      loperamide 2 MG capsule  Commonly known as: IMODIUM   2 mg, Oral, 4 Times Daily PRN      melatonin 5 MG tablet tablet   5 mg, Nightly      metoprolol succinate XL 25 MG 24 hr tablet  Commonly known as: TOPROL-XL   25 mg, Oral, Every Night at Bedtime      nystatin 100,000 unit/mL suspension  Commonly  known as: MYCOSTATIN   4 mL, 4 Times Daily      ondansetron ODT 8 MG disintegrating tablet  Commonly known as: ZOFRAN-ODT   Place 1 tablet on the tongue Every 8 (Eight) Hours As Needed for Nausea or Vomiting.      oxyCODONE 10 MG tablet  Commonly known as: ROXICODONE   1 tablet, Every 4 Hours PRN      pain patient supplied pump   Continuous      PARoxetine 20 MG tablet  Commonly known as: PAXIL   Take 1 tablet by mouth Every Morning.      triamcinolone 0.1 % cream  Commonly known as: KENALOG   1 Application, 2 Times Daily               Allergies   Allergen Reactions    Amoxicillin Shortness Of Breath     Has tolerated Cefazolin, Ceftriaxone, and Cefepime -Jermaine Melida, [facility]    Ceclor [Cefaclor] Shortness Of Breath     Has tolerated Cefazolin, Ceftriaxone, and Cefepime -Jermaine Melida, [facility]      Penicillins Shortness Of Breath     Has tolerated Cefazolin, Ceftriaxone, and Cefepime -Jermaine Melida, [facility]    Sulfa Antibiotics Rash    Valium [Diazepam] Mental Status Change     DEPRESSED    Ambien [Zolpidem] Mental Status Change    Aspirin GI Intolerance    Ativan [Lorazepam] Mental Status Change    Benadryl [Diphenhydramine] Anxiety     AND MAKES HER HYPER    Biaxin [Clarithromycin] Unknown - Low Severity    Cephalexin Unknown - Low Severity    Clindamycin Unknown - Low Severity    Compazine [Prochlorperazine Edisylate] Rash    Contrast Dye (Echo Or Unknown Ct/Mr) Other (See Comments)     Caused pain in her arm    Doxycycline Rash    Nsaids GI Intolerance    Phenergan [Promethazine Hcl] GI Intolerance    Promethazine GI Intolerance    Vancomycin Itching         Discharge Disposition:  Home or Self Care    Diet:  Hospital:  Diet Order   Procedures    Diet: Regular/House; Texture: Regular (IDDSI 7); Fluid Consistency: Thin (IDDSI 0)         Discharge Activity: As tolerated        CODE STATUS:  Code Status and Medical Interventions: CPR (Attempt to Resuscitate); Full Support   Ordered at: 07/10/25 4101     Code Status  (Patient has no pulse and is not breathing):    CPR (Attempt to Resuscitate)     Medical Interventions (Patient has pulse or is breathing):    Full Support     Level Of Support Discussed With:    Patient         Future Appointments   Date Time Provider Department Center   7/22/2025  8:00 AM Kelin Rushing APRN [facility]           Time spent on Discharge including face to face service: 45 minutes    Electronically signed by Teodoro Mancuso MD, 07/14/25, 7:52 AM EDT.    Part of this note may be an electronic transcription/translation of spoken language to printed text using the Dragon Dictation System.

## 2025-07-15 ENCOUNTER — READMISSION MANAGEMENT (OUTPATIENT)
Dept: CALL CENTER | Facility: HOSPITAL | Age: 60
End: 2025-07-15
Payer: MEDICAID

## 2025-07-15 VITALS
OXYGEN SATURATION: 96 % | SYSTOLIC BLOOD PRESSURE: 86 MMHG | HEIGHT: 63 IN | BODY MASS INDEX: 17.3 KG/M2 | RESPIRATION RATE: 16 BRPM | TEMPERATURE: 97.5 F | WEIGHT: 97.66 LBS | HEART RATE: 117 BPM | DIASTOLIC BLOOD PRESSURE: 42 MMHG

## 2025-07-15 LAB
C AURIS DNA SPEC QL NAA+NON-PROBE: NOT DETECTED
GLUCOSE BLDC GLUCOMTR-MCNC: 315 MG/DL (ref 70–99)

## 2025-07-15 PROCEDURE — 94664 DEMO&/EVAL PT USE INHALER: CPT

## 2025-07-15 PROCEDURE — G0378 HOSPITAL OBSERVATION PER HR: HCPCS

## 2025-07-15 PROCEDURE — 63710000001 PREDNISONE PER 5 MG: Performed by: INTERNAL MEDICINE

## 2025-07-15 PROCEDURE — 82948 REAGENT STRIP/BLOOD GLUCOSE: CPT

## 2025-07-15 PROCEDURE — 63710000001 INSULIN REGULAR HUMAN PER 5 UNITS: Performed by: INTERNAL MEDICINE

## 2025-07-15 RX ADMIN — PREDNISONE 15 MG: 10 TABLET ORAL at 08:33

## 2025-07-15 RX ADMIN — ACETAMINOPHEN 1000 MG: 500 TABLET ORAL at 08:33

## 2025-07-15 RX ADMIN — HUMAN INSULIN 5 UNITS: 100 INJECTION, SOLUTION SUBCUTANEOUS at 05:44

## 2025-07-15 RX ADMIN — LOPERAMIDE HYDROCHLORIDE 2 MG: 2 CAPSULE ORAL at 05:44

## 2025-07-15 RX ADMIN — OXYCODONE HYDROCHLORIDE 10 MG: 5 TABLET ORAL at 05:20

## 2025-07-15 RX ADMIN — FAMOTIDINE 40 MG: 20 TABLET, FILM COATED ORAL at 08:32

## 2025-07-15 RX ADMIN — HYDROCODONE BITARTRATE AND ACETAMINOPHEN 1 TABLET: 10; 325 TABLET ORAL at 03:02

## 2025-07-15 RX ADMIN — ALPRAZOLAM 0.25 MG: 0.25 TABLET ORAL at 08:33

## 2025-07-15 RX ADMIN — MORPHINE SULFATE 15 MG: 15 TABLET, FILM COATED, EXTENDED RELEASE ORAL at 08:32

## 2025-07-15 RX ADMIN — OXYCODONE HYDROCHLORIDE 10 MG: 5 TABLET ORAL at 11:38

## 2025-07-15 NOTE — NURSING NOTE
Frequent BM's. Frequent requests to be straight cathed. Pain meds given per MAR. Often refuses care and meds.

## 2025-07-15 NOTE — CONSULTS
Met with patient and daughter today in patient room for EOS visit and assessment for brent eligibility. Discussed covered and non covered services, team approach and frequency of visits. Allowed patient and daughter to speak freely and addressed any questions and concerns at time of visit. Obtained DME list. Patient appeared exhausted and weak. Did not contribute much to conversation but did report that she is done with any aggressive treatment for her cancers and wants to go home asap. Patient and team goals align at this time. Verbal cert obtained from brent ARREOLA. Updated [facility] palliative RN of patient dc plans. Thank you for the referral .

## 2025-07-15 NOTE — NURSING NOTE
Met with pt prior to [facility] consult and followed up after. Pt will be medicated for pain per MAR and sent home via EMS with [facility] services. Primary RN aware. EMS DNR completed with pt scanned to medical records and given to US for dc packet.       Chioma STEELE RN  Palliative Care

## 2025-07-15 NOTE — PROGRESS NOTES
[facility]     Progress Note    Patient Name: Isha Llanes  : 1965  MRN: [id number]  Primary Care Physician:  Provider, No Known  Date of admission: 7/10/2025    Subjective   Subjective     Chief Complaint: Patient is very cooperative alert oriented this morning she states that she wants to be treated now she wants to come next week for me to arrange for her further treatment she does have neuropathy and home health arrangements have been made for getting a physical therapy at home    HPI: Patient more cooperative calm and not in acute distress    Review of Systems   All systems were reviewed and negative except for: Has been reviewed    Objective   Objective     Vitals:   Temp:  [97.1 °F (36.2 °C)-97.8 °F (36.6 °C)] 97.7 °F (36.5 °C)  Heart Rate:  [108-117] 117  Resp:  [18-20] 18  BP: ()/(54-83) 92/72    Physical Exam    Constitutional: Alert and oriented not in acute distress   Eyes: Pupils equal reacting to light and accommodate   HENT: Normocephalic   Neck: No lymphadenopathy   Respiratory: No rales or rhonchi   Cardiovascular: S1-S2 normal no S3 or S4   Gastrointestinal: No palpable organomegaly   Musculoskeletal: No deformities   Neurologic: No localizing sign  Skin: No rash       Result Review    Result Review:  I have personally reviewed the results from the time of this admission to 7/15/2025 07:47 EDT and agree with these findings:  [x]  Laboratory  []  Microbiology  []  Radiology  []  EKG/Telemetry   []  Cardiology/Vascular   []  Pathology  []  Old records  []  Other:  Most notable findings include: Has been reviewed in disc patient with anaplastic large cell lymphoma has been treated with chemotherapy    Assessment & Plan   Assessment / Plan     Brief Patient Summary:  Isha Llanes is a 59 y.o. female who anaplastic large cell lymphoma being treated with chemotherapy    Active Hospital Problems:  Active Hospital Problems    Diagnosis     **Dehydration        Plan:    Patient wants to go home today and states that she will come next week for follow-up visit in the office after the patient has been stabilized    VTE Prophylaxis:  No VTE prophylaxis order currently exists.        CODE STATUS:   Code Status (Patient has no pulse and is not breathing): No CPR (Do Not Attempt to Resuscitate)  Medical Interventions (Patient has pulse or is breathing): Limited Support  Medical Intervention Limits: No intubation (DNI)  Level Of Support Discussed With: Patient   Next of Kin (If No Surrogate)    Disposition:  I expect patient to be discharged patient to be discharged today.    Electronically signed by Teodoro Mancuso MD, 07/15/25, 7:47 AM EDT.      Part of this note may be an electronic transcription/translation of spoken language to printed text using the Dragon Dictation System.

## 2025-07-15 NOTE — NURSING NOTE
Discharge home today by ems with hospice,  LASHAUN has constant pain heart rate elevated 120s-150s refusing metoprolol along with several other medications, refuses dressing changes and discharge wound photos discussed reasons she needed but pt still declined after numerous attempts, refused glucose check at noon as well

## 2025-07-16 ENCOUNTER — DOCUMENTATION (OUTPATIENT)
Dept: ONCOLOGY | Facility: HOSPITAL | Age: 60
End: 2025-07-16
Payer: MEDICAID

## 2025-07-16 NOTE — PROGRESS NOTES
OSW received confirmation from [facility] that Ms. Llanes admitted with hospice on 7/15/25. OSW notified the oncology team.

## 2025-07-16 NOTE — OUTREACH NOTE
Prep Survey      Flowsheet Row Responses   [facility] facility patient discharged from? [facility]   Is LACE score < 7 ? No   Eligibility Readm Mgmt   Discharge diagnosis Dehydration   Does the patient have one of the following disease processes/diagnoses(primary or secondary)? Other   Does the patient have Home health ordered? No   Is there a DME ordered? No   Prep survey completed? Yes            VERONICA SANTAMARIA - Registered Nurse

## 2025-07-22 ENCOUNTER — READMISSION MANAGEMENT (OUTPATIENT)
Dept: CALL CENTER | Facility: HOSPITAL | Age: 60
End: 2025-07-22
Payer: MEDICAID

## 2025-07-22 LAB
QT INTERVAL: 371 MS
QTC INTERVAL: 484 MS

## 2025-07-22 NOTE — OUTREACH NOTE
Medical Week 1 Survey      Flowsheet Row Responses   [facility] facility patient discharged from? [facility]   Does the patient have one of the following disease processes/diagnoses(primary or secondary)? Other   Week 1 attempt successful? No   Unsuccessful attempts Attempt 1   Revoke Change in health status-moved to LTC/SNF/Hospice  [Per Sheila, daughter - pt is now a hospice pt]            Althea CRUZ - Registered Nurse

## 2025-07-28 ENCOUNTER — TELEPHONE (OUTPATIENT)
Dept: GASTROENTEROLOGY | Facility: CLINIC | Age: 60
End: 2025-07-28
Payer: MEDICAID

## 2025-07-28 NOTE — TELEPHONE ENCOUNTER
Patient's daughter Sheila called and reported that this patient has been placed on hospice and has a PEG tube and wants it taken out. Patient believes it is infected and is extremely uncomfortable at this time.

## 2025-07-29 NOTE — TELEPHONE ENCOUNTER
I called and spoke with the daughter. Patient was recently assessed this month by Dr. Ortega for the same concern. I reviewed his note with her and reviewed CT findings that showed no signs of infection at PEG tube site and tube in correct position. I advised that I do not recommend removing the tube due to future need for access for medications especially with her entering into Hospice care. OK to schedule appointment in endoscopy on 7/31 for PEG tube evaluation (possible EGD). I explained that I do not plan any procedures at this time. Please make NPO after MN prior to appointment in case an EGD is indicated, though.     Pt has been scheduled for 7/31/25 for an appt in endo. Arrival time is 1400.   Pts daughter is unable to do the 31st due to her child having a court date. Please advise if another date is possible.

## 2025-07-30 NOTE — TELEPHONE ENCOUNTER
Karine Orlando MD to Me   Can try 8/6 if that works with her schedule.    Attempted to contact pt daughter. Lvm requesting a return call.

## 2025-08-06 ENCOUNTER — HOSPITAL ENCOUNTER (OUTPATIENT)
Dept: GASTROENTEROLOGY | Facility: HOSPITAL | Age: 60
Discharge: HOME | End: 2025-08-06
Payer: MEDICAID

## 2025-08-06 VITALS
TEMPERATURE: 98.9 F | DIASTOLIC BLOOD PRESSURE: 63 MMHG | RESPIRATION RATE: 16 BRPM | OXYGEN SATURATION: 94 % | SYSTOLIC BLOOD PRESSURE: 91 MMHG | HEART RATE: 118 BPM

## 2025-08-15 ENCOUNTER — APPOINTMENT (OUTPATIENT)
Dept: GENERAL RADIOLOGY | Facility: HOSPITAL | Age: 60
End: 2025-08-15
Payer: MEDICAID

## 2025-08-15 ENCOUNTER — HOSPITAL ENCOUNTER (EMERGENCY)
Facility: HOSPITAL | Age: 60
Discharge: HOME | End: 2025-08-16
Attending: EMERGENCY MEDICINE
Payer: MEDICAID

## 2025-08-15 DIAGNOSIS — E87.1 HYPONATREMIA: ICD-10-CM

## 2025-08-15 DIAGNOSIS — G89.29 OTHER CHRONIC PAIN: ICD-10-CM

## 2025-08-15 DIAGNOSIS — E86.0 DEHYDRATION: Primary | ICD-10-CM

## 2025-08-15 DIAGNOSIS — I87.2 VENOUS STASIS DERMATITIS OF BOTH LOWER EXTREMITIES: ICD-10-CM

## 2025-08-15 LAB
ALBUMIN SERPL-MCNC: 2 G/DL (ref 3.5–5.2)
ALBUMIN/GLOB SERPL: 0.5 G/DL
ALP SERPL-CCNC: 143 U/L (ref 39–117)
ALT SERPL W P-5'-P-CCNC: 6 U/L (ref 1–33)
ANION GAP SERPL CALCULATED.3IONS-SCNC: 7.9 MMOL/L (ref 5–15)
AST SERPL-CCNC: 11 U/L (ref 1–32)
BACTERIA UR QL AUTO: ABNORMAL /HPF
BASOPHILS # BLD AUTO: 0.01 10*3/MM3 (ref 0–0.2)
BASOPHILS NFR BLD AUTO: 0.2 % (ref 0–1.5)
BILIRUB SERPL-MCNC: 0.5 MG/DL (ref 0–1.2)
BILIRUB UR QL STRIP: ABNORMAL
BUN SERPL-MCNC: 5.9 MG/DL (ref 6–20)
BUN/CREAT SERPL: 21.9 (ref 7–25)
CALCIUM SPEC-SCNC: 7.9 MG/DL (ref 8.6–10.5)
CHLORIDE SERPL-SCNC: 93 MMOL/L (ref 98–107)
CLARITY UR: ABNORMAL
CO2 SERPL-SCNC: 27.1 MMOL/L (ref 22–29)
COLOR UR: ABNORMAL
CREAT SERPL-MCNC: 0.27 MG/DL (ref 0.57–1)
DEPRECATED RDW RBC AUTO: 53.5 FL (ref 37–54)
EGFRCR SERPLBLD CKD-EPI 2021: 125.5 ML/MIN/1.73
EOSINOPHIL # BLD AUTO: 0 10*3/MM3 (ref 0–0.4)
EOSINOPHIL NFR BLD AUTO: 0 % (ref 0.3–6.2)
ERYTHROCYTE [DISTWIDTH] IN BLOOD BY AUTOMATED COUNT: 18 % (ref 12.3–15.4)
GEN 5 1HR TROPONIN T REFLEX: 41 NG/L
GLOBULIN UR ELPH-MCNC: 4.1 GM/DL
GLUCOSE SERPL-MCNC: 115 MG/DL (ref 65–99)
GLUCOSE UR STRIP-MCNC: NEGATIVE MG/DL
HCT VFR BLD AUTO: 37.5 % (ref 34–46.6)
HGB BLD-MCNC: 11 G/DL (ref 12–15.9)
HGB UR QL STRIP.AUTO: ABNORMAL
HOLD SPECIMEN: NORMAL
HOLD SPECIMEN: NORMAL
HYALINE CASTS UR QL AUTO: ABNORMAL /LPF
IMM GRANULOCYTES # BLD AUTO: 0.02 10*3/MM3 (ref 0–0.05)
IMM GRANULOCYTES NFR BLD AUTO: 0.5 % (ref 0–0.5)
KETONES UR QL STRIP: ABNORMAL
LEUKOCYTE ESTERASE UR QL STRIP.AUTO: ABNORMAL
LYMPHOCYTES # BLD AUTO: 0.36 10*3/MM3 (ref 0.7–3.1)
LYMPHOCYTES NFR BLD AUTO: 8.2 % (ref 19.6–45.3)
MAGNESIUM SERPL-MCNC: 1.3 MG/DL (ref 1.6–2.6)
MCH RBC QN AUTO: 24 PG (ref 26.6–33)
MCHC RBC AUTO-ENTMCNC: 29.3 G/DL (ref 31.5–35.7)
MCV RBC AUTO: 81.9 FL (ref 79–97)
MONOCYTES # BLD AUTO: 0.2 10*3/MM3 (ref 0.1–0.9)
MONOCYTES NFR BLD AUTO: 4.6 % (ref 5–12)
NEUTROPHILS NFR BLD AUTO: 3.8 10*3/MM3 (ref 1.7–7)
NEUTROPHILS NFR BLD AUTO: 86.5 % (ref 42.7–76)
NITRITE UR QL STRIP: NEGATIVE
NRBC BLD AUTO-RTO: 0 /100 WBC (ref 0–0.2)
PH UR STRIP.AUTO: 7.5 [PH] (ref 5–8)
PLATELET # BLD AUTO: 137 10*3/MM3 (ref 140–450)
PMV BLD AUTO: 10.3 FL (ref 6–12)
POTASSIUM SERPL-SCNC: 4.2 MMOL/L (ref 3.5–5.2)
PROT SERPL-MCNC: 6.1 G/DL (ref 6–8.5)
PROT UR QL STRIP: ABNORMAL
QT INTERVAL: 391 MS
QTC INTERVAL: 495 MS
RBC # BLD AUTO: 4.58 10*6/MM3 (ref 3.77–5.28)
RBC # UR STRIP: ABNORMAL /HPF
REF LAB TEST METHOD: ABNORMAL
SODIUM SERPL-SCNC: 128 MMOL/L (ref 136–145)
SP GR UR STRIP: 1.01 (ref 1–1.03)
SQUAMOUS #/AREA URNS HPF: ABNORMAL /HPF
TROPONIN T % DELTA: -9
TROPONIN T NUMERIC DELTA: -4 NG/L
TROPONIN T SERPL HS-MCNC: 45 NG/L
UROBILINOGEN UR QL STRIP: ABNORMAL
WBC # UR STRIP: ABNORMAL /HPF
WBC NRBC COR # BLD AUTO: 4.39 10*3/MM3 (ref 3.4–10.8)
WHOLE BLOOD HOLD COAG: NORMAL
WHOLE BLOOD HOLD SPECIMEN: NORMAL
YEAST URNS QL MICRO: ABNORMAL /HPF

## 2025-08-15 PROCEDURE — 25810000003 SODIUM CHLORIDE 0.9 % SOLUTION: Performed by: EMERGENCY MEDICINE

## 2025-08-15 PROCEDURE — 25010000002 ONDANSETRON PER 1 MG: Performed by: EMERGENCY MEDICINE

## 2025-08-15 PROCEDURE — 96374 THER/PROPH/DIAG INJ IV PUSH: CPT

## 2025-08-15 PROCEDURE — 93005 ELECTROCARDIOGRAM TRACING: CPT

## 2025-08-15 PROCEDURE — 81001 URINALYSIS AUTO W/SCOPE: CPT

## 2025-08-15 PROCEDURE — 80053 COMPREHEN METABOLIC PANEL: CPT

## 2025-08-15 PROCEDURE — 99284 EMERGENCY DEPT VISIT MOD MDM: CPT

## 2025-08-15 PROCEDURE — 71045 X-RAY EXAM CHEST 1 VIEW: CPT

## 2025-08-15 PROCEDURE — 85025 COMPLETE CBC W/AUTO DIFF WBC: CPT

## 2025-08-15 PROCEDURE — 25010000002 MORPHINE PER 10 MG: Performed by: EMERGENCY MEDICINE

## 2025-08-15 PROCEDURE — 83735 ASSAY OF MAGNESIUM: CPT

## 2025-08-15 PROCEDURE — 96375 TX/PRO/DX INJ NEW DRUG ADDON: CPT

## 2025-08-15 PROCEDURE — 84484 ASSAY OF TROPONIN QUANT: CPT

## 2025-08-15 PROCEDURE — 25010000002 HYDROMORPHONE 1 MG/ML SOLUTION: Performed by: EMERGENCY MEDICINE

## 2025-08-15 PROCEDURE — 36415 COLL VENOUS BLD VENIPUNCTURE: CPT

## 2025-08-15 PROCEDURE — 93005 ELECTROCARDIOGRAM TRACING: CPT | Performed by: EMERGENCY MEDICINE

## 2025-08-15 RX ORDER — SODIUM CHLORIDE 0.9 % (FLUSH) 0.9 %
10 SYRINGE (ML) INJECTION AS NEEDED
Status: DISCONTINUED | OUTPATIENT
Start: 2025-08-15 | End: 2025-08-16 | Stop reason: HOSPADM

## 2025-08-15 RX ORDER — ONDANSETRON 2 MG/ML
4 INJECTION INTRAMUSCULAR; INTRAVENOUS ONCE
Status: COMPLETED | OUTPATIENT
Start: 2025-08-15 | End: 2025-08-15

## 2025-08-15 RX ORDER — OXYCODONE HYDROCHLORIDE 5 MG/1
20 TABLET ORAL ONCE
Refills: 0 | Status: COMPLETED | OUTPATIENT
Start: 2025-08-15 | End: 2025-08-16

## 2025-08-15 RX ORDER — FLUCONAZOLE 150 MG/1
150 TABLET ORAL ONCE
Status: COMPLETED | OUTPATIENT
Start: 2025-08-15 | End: 2025-08-16

## 2025-08-15 RX ORDER — MORPHINE SULFATE 2 MG/ML
2 INJECTION, SOLUTION INTRAMUSCULAR; INTRAVENOUS ONCE
Status: COMPLETED | OUTPATIENT
Start: 2025-08-15 | End: 2025-08-15

## 2025-08-15 RX ADMIN — SODIUM CHLORIDE 1000 ML: 9 INJECTION, SOLUTION INTRAVENOUS at 21:43

## 2025-08-15 RX ADMIN — ONDANSETRON 4 MG: 2 INJECTION, SOLUTION INTRAMUSCULAR; INTRAVENOUS at 20:26

## 2025-08-15 RX ADMIN — SODIUM CHLORIDE 1000 ML: 9 INJECTION, SOLUTION INTRAVENOUS at 20:18

## 2025-08-15 RX ADMIN — MORPHINE SULFATE 2 MG: 2 INJECTION, SOLUTION INTRAMUSCULAR; INTRAVENOUS at 20:18

## 2025-08-15 RX ADMIN — HYDROMORPHONE HYDROCHLORIDE 1 MG: 1 INJECTION, SOLUTION INTRAMUSCULAR; INTRAVENOUS; SUBCUTANEOUS at 21:54

## 2025-08-16 VITALS
TEMPERATURE: 97 F | RESPIRATION RATE: 20 BRPM | DIASTOLIC BLOOD PRESSURE: 70 MMHG | BODY MASS INDEX: 16.79 KG/M2 | SYSTOLIC BLOOD PRESSURE: 94 MMHG | OXYGEN SATURATION: 95 % | HEART RATE: 105 BPM | WEIGHT: 104.5 LBS | HEIGHT: 66 IN

## 2025-08-16 RX ADMIN — FLUCONAZOLE 150 MG: 150 TABLET ORAL at 01:06

## 2025-08-16 RX ADMIN — OXYCODONE HYDROCHLORIDE 20 MG: 5 TABLET ORAL at 01:06

## 2025-08-22 PROBLEM — E43 SEVERE PROTEIN-CALORIE MALNUTRITION: Status: ACTIVE | Noted: 2025-01-01

## 2025-08-23 PROBLEM — I31.4 CARDIAC TAMPONADE: Status: ACTIVE | Noted: 2025-01-01

## 2025-08-25 PROBLEM — N17.9 AKI (ACUTE KIDNEY INJURY): Status: ACTIVE | Noted: 2025-01-01

## 2025-08-26 LAB
ALBUMIN FLD-MCNC: 1.4 G/DL
BACTERIA SPEC RESP CULT: NORMAL
GLUCOSE FLD-MCNC: 97 MG/DL
GRAM STN SPEC: NORMAL
LDH FLD L TO P-CCNC: 514 IU/L
PROT FLD-MCNC: 3.9 G/DL

## 2025-08-27 LAB
BACTERIA FLD CULT: NORMAL
GRAM STN SPEC: NORMAL
GRAM STN SPEC: NORMAL

## 2025-08-28 LAB
QT INTERVAL: 391 MS
QTC INTERVAL: 495 MS

## 2025-08-30 LAB
MYCOBACTERIUM SPEC CULT: NORMAL
NIGHT BLUE STAIN TISS: NORMAL

## (undated) DEVICE — PK CATH CARD 40

## (undated) DEVICE — SOL IRRG H2O PL/BG 1000ML STRL

## (undated) DEVICE — SHLD ANGIO 2LAYR CIR FEN

## (undated) DEVICE — DRP C/ARM 41X74IN

## (undated) DEVICE — GLV SURG BIOGEL M LTX PF 7 1/2

## (undated) DEVICE — SINGLE USE SUCTION VALVE MAJ-209: Brand: SINGLE USE SUCTION VALVE (STERILE)

## (undated) DEVICE — MINOR-LF: Brand: MEDLINE INDUSTRIES, INC.

## (undated) DEVICE — DRSNG TELFA PAD NONADH STR 1S 3X4IN

## (undated) DEVICE — SOL IRR NACL 0.9PCT BT 1000ML

## (undated) DEVICE — SINGLE USE BIOPSY VALVE MAJ-210: Brand: SINGLE USE BIOPSY VALVE (STERILE)

## (undated) DEVICE — VISION PROBE ADAPTER AND SUCTION ADAPTER

## (undated) DEVICE — SYRINGE ONLY,20ML LUER LOCK: Brand: MEDLINE INDUSTRIES, INC.

## (undated) DEVICE — TOWEL,OR,DSP,ST,BLUE,STD,4/PK,20PK/CS: Brand: MEDLINE

## (undated) DEVICE — SUT SILK 0 CT1 18IN 424H

## (undated) DEVICE — CONN JET HYDRA H20 AUXILIARY DISP

## (undated) DEVICE — SUT VIC 3/0 PS2 27IN J427H

## (undated) DEVICE — SUT SILK 2/0 FS BLK 18IN 685G

## (undated) DEVICE — CATH ELECTRD PACE TEMP BI NONHEP 5F110CM

## (undated) DEVICE — GLIDESHEATH SLENDER STAINLESS STEEL KIT: Brand: GLIDESHEATH SLENDER

## (undated) DEVICE — IRRIGATOR BULB ASEPTO 60CC STRL

## (undated) DEVICE — DRSNG SURESITE WNDW 4X4.5

## (undated) DEVICE — BLCK/BITE BLOX WO/DENTL/RIM W/STRAP 54F

## (undated) DEVICE — SYR LUERLOK 20CC BX/50

## (undated) DEVICE — ELECTRD BLD EZ CLN MOD XLNG 2.75IN

## (undated) DEVICE — PK PROC MAJ 40

## (undated) DEVICE — Device: Brand: DEFENDO AIR/WATER/SUCTION AND BIOPSY VALVE

## (undated) DEVICE — GLIDESHEATH BASIC HYDROPHILIC COATED INTRODUCER SHEATH: Brand: GLIDESHEATH

## (undated) DEVICE — BALN DIL TYSHAK/X 22MM 5X102

## (undated) DEVICE — BASN EMESS 500ML GRY

## (undated) DEVICE — SYR SLP TP 10ML DISP

## (undated) DEVICE — DISPOSABLE BIOPSY FORCEPS: Brand: DISPOSABLE BIOPSY FORCEPS

## (undated) DEVICE — NDL HYPO PRECISIONGLIDE REG 25G 1 1/2

## (undated) DEVICE — SOLIDIFIER LIQLOC PLS 1500CC BT

## (undated) DEVICE — DISPOSABLE ADAPTER

## (undated) DEVICE — 3M™ IOBAN™ 2 ANTIMICROBIAL INCISE DRAPE 6650EZ: Brand: IOBAN™ 2

## (undated) DEVICE — ADHS SKIN DERMABOND TOP ADVANCED

## (undated) DEVICE — CATHETER: Brand: ION

## (undated) DEVICE — GLV SURG SENSICARE PI LF PF 8 GRN STRL

## (undated) DEVICE — KT ANTI FOG W/FLD AND SPNG

## (undated) DEVICE — SUT VIC 2/0 CT2 27IN J269H

## (undated) DEVICE — TRAP,MUCUS SPECIMEN,40CC: Brand: MEDLINE

## (undated) DEVICE — SPONGE,NEURO,0.5"X3",XR,STRL,LF,10/PK: Brand: MEDLINE

## (undated) DEVICE — STPCK 1WY SPINLOCK FML LL NONDEHP LF .20ML

## (undated) DEVICE — KT COMPLIANCE ENDO PREV INFCT

## (undated) DEVICE — GW FC FLOP/TP .035 260CM 3MM

## (undated) DEVICE — ANGIO-SEAL STS PLUS VASCULAR CLOSURE DEVICE: Brand: ANGIO-SEAL

## (undated) DEVICE — PROGRAMMER PUMP EXT FOR SYNCHROMEDII TH90T01

## (undated) DEVICE — GW AMPLTZ SUPERSTIFF STR .035IN 260CM

## (undated) DEVICE — PERCLOSE PROGLIDE™ SUTURE-MEDIATED CLOSURE SYSTEM: Brand: PERCLOSE PROGLIDE™

## (undated) DEVICE — BALN PRESS WEDGE 5F 110CM

## (undated) DEVICE — KT PEG ENDOVIVE ENFIT SFTY PULL 20F 1P/U

## (undated) DEVICE — Device

## (undated) DEVICE — SYR LUER SLPTP 50ML

## (undated) DEVICE — Device: Brand: BALLOON

## (undated) DEVICE — CATH DIAG IMPULSE PIG145 6F 110CM

## (undated) DEVICE — GW EMR FIX EXCHG J STD .035 3MM 260CM

## (undated) DEVICE — BALN DIL TYSHAK/X 20MM 5X102

## (undated) DEVICE — LINER SURG CANSTR SXN S/RIGD 1500CC

## (undated) DEVICE — SUT SILK 0 SH 30IN K834H

## (undated) DEVICE — GW INQWIRE FC PTFE STR .035IN 150

## (undated) DEVICE — DRAPE,CHEST,FENES,15X10,STERIL: Brand: MEDLINE

## (undated) DEVICE — KT PERICARDIOCENTESIS PIG .035IN 6F

## (undated) DEVICE — SLV SCD KN/LEN ADJ EXPRSS BLENDED MD 1P/U

## (undated) DEVICE — COLON KIT: Brand: MEDLINE INDUSTRIES, INC.

## (undated) DEVICE — STPLR SKIN VISISTAT WD 35CT

## (undated) DEVICE — BIOPSY NEEDLE, 19G: Brand: FLEXISION

## (undated) DEVICE — CATH DIAG IMPULSE PIG 5F 100CM

## (undated) DEVICE — DRSNG TELFA PAD NONADH STR 1S 3X8IN

## (undated) DEVICE — GLV XRAY PROTECT RADIONX LTX SZ8 KHAKI

## (undated) DEVICE — NEEDLE,18GX1.5",REG,BEVEL: Brand: MEDLINE

## (undated) DEVICE — CATHETER GUIDE

## (undated) DEVICE — CATH DIAG IMPULSE AL1 6F 100CM

## (undated) DEVICE — BND PRESS RADL COMFRT 14IN STRL

## (undated) DEVICE — RADIFOCUS OPTITORQUE ANGIOGRAPHIC CATHETER: Brand: OPTITORQUE

## (undated) DEVICE — SINGLE USE ASPIRATION NEEDLE: Brand: SINGLE USE ASPIRATION NEEDLE

## (undated) DEVICE — CATH F4 INF JL 4 100CM: Brand: INFINITI

## (undated) DEVICE — KT MANIFLD CARDIAC

## (undated) DEVICE — PINNACLE INTRODUCER SHEATH: Brand: PINNACLE

## (undated) DEVICE — SUT SILK 0/0 CT2 18IN C027D

## (undated) DEVICE — REPROCESSING ACCESSORIES KIT

## (undated) DEVICE — REPROCESSING CONSUMABLES KIT

## (undated) DEVICE — Device: Brand: PORTEX

## (undated) DEVICE — EGD OR ERCP KIT: Brand: MEDLINE INDUSTRIES, INC.

## (undated) DEVICE — GW XCHG AMPLTZ XSTIF PTFE CRV .035 3X260

## (undated) DEVICE — REPROCESSING COVER

## (undated) DEVICE — INTRO SHEATH ART/FEM ENGAGE .035 6F12CM

## (undated) DEVICE — MAJ-1414 SINGLE USE ADPATER BIOPSY VALV: Brand: SINGLE USE ADAPTOR BIOPSY VALVE

## (undated) DEVICE — SENSOR CONNECTION CLEANER

## (undated) DEVICE — SOL IRR H2O BO 100ML STRL

## (undated) DEVICE — ST ACC MICROPUNCTURE STFF .018 ECHO/PLAT/TP 4F/10CM 21G/7CM

## (undated) DEVICE — DEV ATOMIZATION MUCOSAL/NASALTRACH

## (undated) DEVICE — SWIVEL CONNECTOR

## (undated) DEVICE — DISPOSABLE CYTOLOGY BRUSH: Brand: DISPOSABLE CYTOLOGY BRUSH

## (undated) DEVICE — VISION PROBE: Brand: ION

## (undated) DEVICE — SINGLE-USE BIOPSY FORCEPS: Brand: RADIAL JAW 4

## (undated) DEVICE — THE SINGLE USE ETRAP – POLYP TRAP IS USED FOR SUCTION RETRIEVAL OF ENDOSCOPICALLY REMOVED POLYPS.: Brand: ETRAP

## (undated) DEVICE — KT LINEN QUICKSQUITE SAHARA STD DRW/LIFT 60X78IN

## (undated) DEVICE — GLV SURG BIOGEL LTX PF 7 1/2

## (undated) DEVICE — APPL CHLORAPREP W/TINT 26ML ORNG

## (undated) DEVICE — HI-TORQUE SUPRA CORE .035 PERIPHERAL GUIDE WIRE .035 X 190 CM: Brand: HI-TORQUE SUPRA CORE

## (undated) DEVICE — PK CHST BRST 40

## (undated) DEVICE — APPL CHLORAPREP HI/LITE 26ML ORNG

## (undated) DEVICE — CATH LAB PACK: Brand: MEDLINE INDUSTRIES, INC.